# Patient Record
Sex: MALE | Race: WHITE | Employment: UNEMPLOYED | URBAN - METROPOLITAN AREA
[De-identification: names, ages, dates, MRNs, and addresses within clinical notes are randomized per-mention and may not be internally consistent; named-entity substitution may affect disease eponyms.]

---

## 2018-05-04 ENCOUNTER — PREP FOR PROCEDURE (OUTPATIENT)
Dept: GASTROENTEROLOGY | Age: 43
End: 2018-05-04

## 2018-05-04 RX ORDER — SODIUM CHLORIDE 9 MG/ML
INJECTION, SOLUTION INTRAVENOUS CONTINUOUS
Status: CANCELLED | OUTPATIENT
Start: 2018-05-04

## 2018-05-07 ENCOUNTER — ANESTHESIA EVENT (OUTPATIENT)
Dept: ENDOSCOPY | Age: 43
End: 2018-05-07
Payer: MEDICAID

## 2018-05-07 ENCOUNTER — ANESTHESIA (OUTPATIENT)
Dept: ENDOSCOPY | Age: 43
End: 2018-05-07
Payer: MEDICAID

## 2018-05-07 ENCOUNTER — HOSPITAL ENCOUNTER (OUTPATIENT)
Age: 43
Setting detail: OUTPATIENT SURGERY
Discharge: HOME | End: 2018-05-07
Attending: INTERNAL MEDICINE | Admitting: INTERNAL MEDICINE
Payer: MEDICAID

## 2018-05-07 VITALS
OXYGEN SATURATION: 96 % | SYSTOLIC BLOOD PRESSURE: 121 MMHG | RESPIRATION RATE: 16 BRPM | HEART RATE: 88 BPM | TEMPERATURE: 97 F | WEIGHT: 300 LBS | DIASTOLIC BLOOD PRESSURE: 78 MMHG | HEIGHT: 74 IN | BODY MASS INDEX: 38.5 KG/M2

## 2018-05-07 VITALS — DIASTOLIC BLOOD PRESSURE: 73 MMHG | SYSTOLIC BLOOD PRESSURE: 120 MMHG | OXYGEN SATURATION: 98 %

## 2018-05-07 LAB — METER GLUCOSE: 120 MG/DL (ref 70–110)

## 2018-05-07 PROCEDURE — 6360000002 HC RX W HCPCS: Performed by: NURSE ANESTHETIST, CERTIFIED REGISTERED

## 2018-05-07 PROCEDURE — 7100000011 HC PHASE II RECOVERY - ADDTL 15 MIN: Performed by: INTERNAL MEDICINE

## 2018-05-07 PROCEDURE — 3700000000 HC ANESTHESIA ATTENDED CARE: Performed by: INTERNAL MEDICINE

## 2018-05-07 PROCEDURE — 3700000001 HC ADD 15 MINUTES (ANESTHESIA): Performed by: INTERNAL MEDICINE

## 2018-05-07 PROCEDURE — 3609009500 HC COLONOSCOPY DIAGNOSTIC OR SCREENING: Performed by: INTERNAL MEDICINE

## 2018-05-07 PROCEDURE — 2580000003 HC RX 258: Performed by: INTERNAL MEDICINE

## 2018-05-07 PROCEDURE — 7100000010 HC PHASE II RECOVERY - FIRST 15 MIN: Performed by: INTERNAL MEDICINE

## 2018-05-07 PROCEDURE — 82962 GLUCOSE BLOOD TEST: CPT

## 2018-05-07 RX ORDER — SODIUM CHLORIDE 0.9 % (FLUSH) 0.9 %
10 SYRINGE (ML) INJECTION PRN
Status: DISCONTINUED | OUTPATIENT
Start: 2018-05-07 | End: 2018-05-07 | Stop reason: HOSPADM

## 2018-05-07 RX ORDER — PROPOFOL 10 MG/ML
INJECTION, EMULSION INTRAVENOUS PRN
Status: DISCONTINUED | OUTPATIENT
Start: 2018-05-07 | End: 2018-05-07 | Stop reason: SDUPTHER

## 2018-05-07 RX ORDER — SODIUM CHLORIDE 0.9 % (FLUSH) 0.9 %
10 SYRINGE (ML) INJECTION EVERY 12 HOURS SCHEDULED
Status: DISCONTINUED | OUTPATIENT
Start: 2018-05-07 | End: 2018-05-07 | Stop reason: HOSPADM

## 2018-05-07 RX ORDER — SODIUM CHLORIDE 9 MG/ML
INJECTION, SOLUTION INTRAVENOUS CONTINUOUS
Status: DISCONTINUED | OUTPATIENT
Start: 2018-05-07 | End: 2018-05-07 | Stop reason: HOSPADM

## 2018-05-07 RX ADMIN — SODIUM CHLORIDE: 9 INJECTION, SOLUTION INTRAVENOUS at 13:55

## 2018-05-07 RX ADMIN — PROPOFOL 400 MG: 10 INJECTION, EMULSION INTRAVENOUS at 14:00

## 2018-05-07 ASSESSMENT — PAIN SCALES - GENERAL
PAINLEVEL_OUTOF10: 0

## 2018-05-07 ASSESSMENT — PAIN - FUNCTIONAL ASSESSMENT: PAIN_FUNCTIONAL_ASSESSMENT: 0-10

## 2018-05-21 ENCOUNTER — OFFICE VISIT (OUTPATIENT)
Dept: FAMILY MEDICINE CLINIC | Age: 43
End: 2018-05-21
Payer: MEDICAID

## 2018-05-21 VITALS
HEART RATE: 104 BPM | DIASTOLIC BLOOD PRESSURE: 80 MMHG | HEIGHT: 74 IN | OXYGEN SATURATION: 95 % | TEMPERATURE: 98.4 F | RESPIRATION RATE: 16 BRPM | SYSTOLIC BLOOD PRESSURE: 122 MMHG | WEIGHT: 296 LBS | BODY MASS INDEX: 37.99 KG/M2

## 2018-05-21 DIAGNOSIS — E11.9 TYPE 2 DIABETES MELLITUS WITHOUT COMPLICATION, WITHOUT LONG-TERM CURRENT USE OF INSULIN (HCC): Primary | ICD-10-CM

## 2018-05-21 DIAGNOSIS — F51.01 PRIMARY INSOMNIA: ICD-10-CM

## 2018-05-21 DIAGNOSIS — E78.2 MIXED HYPERLIPIDEMIA: ICD-10-CM

## 2018-05-21 DIAGNOSIS — M54.16 LUMBAR RADICULOPATHY: ICD-10-CM

## 2018-05-21 DIAGNOSIS — I10 ESSENTIAL HYPERTENSION: ICD-10-CM

## 2018-05-21 DIAGNOSIS — M79.2 NEUROPATHIC PAIN: ICD-10-CM

## 2018-05-21 LAB — HBA1C MFR BLD: 7 %

## 2018-05-21 PROCEDURE — 83036 HEMOGLOBIN GLYCOSYLATED A1C: CPT | Performed by: NURSE PRACTITIONER

## 2018-05-21 PROCEDURE — 99214 OFFICE O/P EST MOD 30 MIN: CPT | Performed by: NURSE PRACTITIONER

## 2018-05-21 PROCEDURE — 3045F PR MOST RECENT HEMOGLOBIN A1C LEVEL 7.0-9.0%: CPT | Performed by: NURSE PRACTITIONER

## 2018-05-21 PROCEDURE — 2022F DILAT RTA XM EVC RTNOPTHY: CPT | Performed by: NURSE PRACTITIONER

## 2018-05-21 PROCEDURE — 4004F PT TOBACCO SCREEN RCVD TLK: CPT | Performed by: NURSE PRACTITIONER

## 2018-05-21 PROCEDURE — G8417 CALC BMI ABV UP PARAM F/U: HCPCS | Performed by: NURSE PRACTITIONER

## 2018-05-21 PROCEDURE — G8427 DOCREV CUR MEDS BY ELIG CLIN: HCPCS | Performed by: NURSE PRACTITIONER

## 2018-05-21 RX ORDER — PREGABALIN 300 MG/1
CAPSULE ORAL
Qty: 60 CAPSULE | Refills: 3 | Status: SHIPPED | OUTPATIENT
Start: 2018-05-21 | End: 2018-08-22 | Stop reason: SDUPTHER

## 2018-05-21 RX ORDER — ZOLPIDEM TARTRATE 10 MG/1
TABLET ORAL
Qty: 30 TABLET | Refills: 0 | Status: SHIPPED | OUTPATIENT
Start: 2018-05-21 | End: 2018-08-18

## 2018-05-21 RX ORDER — DULOXETIN HYDROCHLORIDE 30 MG/1
30 CAPSULE, DELAYED RELEASE ORAL DAILY
Qty: 30 CAPSULE | Refills: 5 | Status: SHIPPED | OUTPATIENT
Start: 2018-05-21 | End: 2018-11-29 | Stop reason: SDUPTHER

## 2018-05-21 RX ORDER — FLUTICASONE PROPIONATE 50 MCG
2 SPRAY, SUSPENSION (ML) NASAL DAILY
Qty: 1 BOTTLE | Refills: 5 | Status: SHIPPED | OUTPATIENT
Start: 2018-05-21 | End: 2018-11-29 | Stop reason: SDUPTHER

## 2018-05-21 RX ORDER — CYCLOBENZAPRINE HCL 10 MG
10 TABLET ORAL 3 TIMES DAILY PRN
Qty: 90 TABLET | Refills: 5 | Status: SHIPPED | OUTPATIENT
Start: 2018-05-21 | End: 2018-11-29 | Stop reason: SDUPTHER

## 2018-05-21 RX ORDER — PRAVASTATIN SODIUM 20 MG
TABLET ORAL
Qty: 30 TABLET | Refills: 5 | Status: SHIPPED | OUTPATIENT
Start: 2018-05-21 | End: 2018-11-29 | Stop reason: SDUPTHER

## 2018-05-21 RX ORDER — FENOFIBRATE 54 MG/1
54 TABLET ORAL DAILY
Qty: 30 TABLET | Refills: 5 | Status: SHIPPED | OUTPATIENT
Start: 2018-05-21 | End: 2018-11-29 | Stop reason: SDUPTHER

## 2018-05-21 RX ORDER — ASPIRIN 81 MG/1
81 TABLET ORAL DAILY
Qty: 30 TABLET | Refills: 5 | Status: SHIPPED | OUTPATIENT
Start: 2018-05-21 | End: 2018-11-18 | Stop reason: SDUPTHER

## 2018-05-21 RX ORDER — LISINOPRIL 20 MG/1
TABLET ORAL
Qty: 30 TABLET | Refills: 5 | Status: SHIPPED | OUTPATIENT
Start: 2018-05-21 | End: 2018-11-29 | Stop reason: SDUPTHER

## 2018-05-21 ASSESSMENT — ENCOUNTER SYMPTOMS
WHEEZING: 0
NAUSEA: 0
SORE THROAT: 0
VOICE CHANGE: 0
EYE REDNESS: 0
CONSTIPATION: 0
FACIAL SWELLING: 0
SHORTNESS OF BREATH: 0
COUGH: 0
SINUS PAIN: 0
ABDOMINAL DISTENTION: 0
PHOTOPHOBIA: 0
EYE PAIN: 0
TROUBLE SWALLOWING: 0
DIARRHEA: 0
STRIDOR: 0
VOMITING: 0
SINUS PRESSURE: 0
RECTAL PAIN: 0
COLOR CHANGE: 0
EYE ITCHING: 0
APNEA: 0
EYE DISCHARGE: 0
CHOKING: 0
ABDOMINAL PAIN: 0
RHINORRHEA: 0
CHEST TIGHTNESS: 0

## 2018-05-29 DIAGNOSIS — M16.11 PRIMARY OSTEOARTHRITIS OF RIGHT HIP: Primary | ICD-10-CM

## 2018-05-31 ENCOUNTER — HOSPITAL ENCOUNTER (OUTPATIENT)
Dept: GENERAL RADIOLOGY | Age: 43
Discharge: HOME | End: 2018-06-02
Payer: MEDICAID

## 2018-05-31 ENCOUNTER — OFFICE VISIT (OUTPATIENT)
Dept: ORTHOPEDIC SURGERY | Age: 43
End: 2018-05-31
Payer: MEDICAID

## 2018-05-31 VITALS — OXYGEN SATURATION: 98 % | HEIGHT: 74 IN | HEART RATE: 80 BPM | WEIGHT: 295 LBS | BODY MASS INDEX: 37.86 KG/M2

## 2018-05-31 DIAGNOSIS — M54.16 LUMBAR RADICULOPATHY: ICD-10-CM

## 2018-05-31 DIAGNOSIS — M16.11 PRIMARY OSTEOARTHRITIS OF RIGHT HIP: ICD-10-CM

## 2018-05-31 DIAGNOSIS — M16.12 PRIMARY OSTEOARTHRITIS OF LEFT HIP: Primary | ICD-10-CM

## 2018-05-31 PROCEDURE — 73502 X-RAY EXAM HIP UNI 2-3 VIEWS: CPT

## 2018-05-31 PROCEDURE — 4004F PT TOBACCO SCREEN RCVD TLK: CPT | Performed by: ORTHOPAEDIC SURGERY

## 2018-05-31 PROCEDURE — G8427 DOCREV CUR MEDS BY ELIG CLIN: HCPCS | Performed by: ORTHOPAEDIC SURGERY

## 2018-05-31 PROCEDURE — 99212 OFFICE O/P EST SF 10 MIN: CPT | Performed by: ORTHOPAEDIC SURGERY

## 2018-05-31 PROCEDURE — G8417 CALC BMI ABV UP PARAM F/U: HCPCS | Performed by: ORTHOPAEDIC SURGERY

## 2018-05-31 PROCEDURE — 99212 OFFICE O/P EST SF 10 MIN: CPT

## 2018-07-18 ENCOUNTER — HOSPITAL ENCOUNTER (EMERGENCY)
Age: 43
Discharge: HOME | End: 2018-07-19
Attending: EMERGENCY MEDICINE
Payer: MEDICAID

## 2018-07-18 VITALS
SYSTOLIC BLOOD PRESSURE: 138 MMHG | RESPIRATION RATE: 14 BRPM | HEIGHT: 74 IN | TEMPERATURE: 98.3 F | OXYGEN SATURATION: 100 % | WEIGHT: 196 LBS | HEART RATE: 94 BPM | DIASTOLIC BLOOD PRESSURE: 80 MMHG | BODY MASS INDEX: 25.15 KG/M2

## 2018-07-18 DIAGNOSIS — L23.7 POISON IVY DERMATITIS: Primary | ICD-10-CM

## 2018-07-18 PROCEDURE — 99282 EMERGENCY DEPT VISIT SF MDM: CPT

## 2018-07-18 PROCEDURE — 6370000000 HC RX 637 (ALT 250 FOR IP): Performed by: EMERGENCY MEDICINE

## 2018-07-18 PROCEDURE — 96372 THER/PROPH/DIAG INJ SC/IM: CPT

## 2018-07-18 PROCEDURE — 6360000002 HC RX W HCPCS: Performed by: EMERGENCY MEDICINE

## 2018-07-18 RX ORDER — KETOROLAC TROMETHAMINE 30 MG/ML
60 INJECTION, SOLUTION INTRAMUSCULAR; INTRAVENOUS ONCE
Status: COMPLETED | OUTPATIENT
Start: 2018-07-19 | End: 2018-07-18

## 2018-07-18 RX ORDER — METHYLPREDNISOLONE SODIUM SUCCINATE 125 MG/2ML
125 INJECTION, POWDER, LYOPHILIZED, FOR SOLUTION INTRAMUSCULAR; INTRAVENOUS ONCE
Status: COMPLETED | OUTPATIENT
Start: 2018-07-18 | End: 2018-07-18

## 2018-07-18 RX ORDER — FAMOTIDINE 20 MG/1
20 TABLET, FILM COATED ORAL ONCE
Status: COMPLETED | OUTPATIENT
Start: 2018-07-18 | End: 2018-07-18

## 2018-07-18 RX ORDER — METHYLPREDNISOLONE 4 MG/1
TABLET ORAL
Qty: 1 KIT | Refills: 0 | Status: SHIPPED | OUTPATIENT
Start: 2018-07-18 | End: 2018-07-24

## 2018-07-18 RX ADMIN — KETOROLAC TROMETHAMINE 60 MG: 30 INJECTION, SOLUTION INTRAMUSCULAR at 23:48

## 2018-07-18 RX ADMIN — METHYLPREDNISOLONE SODIUM SUCCINATE 125 MG: 125 INJECTION, POWDER, FOR SOLUTION INTRAMUSCULAR; INTRAVENOUS at 23:43

## 2018-07-18 RX ADMIN — FAMOTIDINE 20 MG: 20 TABLET ORAL at 23:43

## 2018-07-18 ASSESSMENT — PAIN SCALES - GENERAL
PAINLEVEL_OUTOF10: 8
PAINLEVEL_OUTOF10: 8

## 2018-07-19 NOTE — ED NOTES
Discharged with a followup to PCP.  Return here with worsenings or concerns     Bob Bronson RN  07/19/18 0002

## 2018-07-19 NOTE — ED PROVIDER NOTES
Department of Emergency Medicine   ED  Provider Note  ED Room: 06/06   HPI:  7/18/18, Time: 11:42 PM     Hitesh Hernandez II is a 38 y.o. male presenting to the ED for rash, worsening today. The complaint has been constant, moderate in severity, and worsened by nothing. Pt hx provided by the pt. Pt states he has been burning poison ivy or poison oak, and presents with a diffuse erythematous rash. He has been taking Benadryl but denies any relief. He c/o diffuse pain and itching. Pt denies any CP, SOB, HA, N/V/D, abdominal pain, back pain, neck pain, numbness/tingling in the extremities, or any other symptoms at this time. ROS:   Pertinent positives and negatives are stated within HPI, all other systems reviewed and are negative.    --------------------------------------------- PAST HISTORY ---------------------------------------------  Past Medical History:  has a past medical history of SIMÓN (acute kidney injury) ([facility] [address] Allergic rhinitis; Chronic back pain; Depression; Diabetes mellitus ([facility] [address] Difficulty sleeping; Displacement of lumbar intervertebral disc without myelopathy; Fibromyalgia; Fractured rib; H/O seasonal allergies; Head injury; Hyperlipidemia; Hypertension; Obesity (BMI 35.0-39.9 without comorbidity); Osteoarthritis; and Thoracic or lumbosacral neuritis or radiculitis, unspecified. Past Surgical History:  has a past surgical history that includes Neck surgery (2000); shoulder surgery (2001 &2007); Wrist surgery (2007); Rotator cuff repair (Left, 2014); hernia repair (2001); Shoulder arthroscopy (Left, 11 21 14); Vasectomy; Hip Arthroplasty (Left, 4/11/2016); Total hip arthroplasty (Right, 10/31/2016); Foot surgery (Right, 1985); and pr colonoscopy flx dx w/collj spec when pfrmd (N/A, 5/7/2018). Social History:  reports that he has never smoked. His smokeless tobacco use includes Chew. He reports that he does not drink alcohol or use drugs.     Family History: family history includes Arthritis in his father; Cancer in his maternal grandfather and paternal uncle; Diabetes in his father and paternal grandfather; Heart Disease in his maternal grandmother; Hypertension in his mother; Stroke in his maternal aunt. The patients home medications have been reviewed. Allergies: Seasonal and Tramadol    -----------------------------------------PHYSICAL EXAM------------------------------------------------  Constitutional/General: Alert and oriented x3, well appearing, non toxic  Head: NC/AT  Mouth: Oropharynx clear, handling secretions, no posterior edema, erythema, or exudates  Neck: Supple, full ROM  Pulmonary: Lungs clear to auscultation bilaterally, no wheezes, rales, or rhonchi. Not in respiratory distress. Cardiovascular:  Regular rate and rhythm, no murmurs, gallops, or rubs. 2+ distal pulses. Abdomen: Soft, non tender, non distended  Extremities: Moves all extremities x 4. Warm and well perfused. Skin: warm and dry. Diffuse erythematous rash on the upper and lower extremities, neck, abdomen, and back. Neurologic: GCS 15, CN 2-12 grossly intact, no focal deficits, no meningeal signs  Psych: Normal Affect.    -------------------------------------------------- RESULTS -------------------------------------------------  All laboratory and radiology results have been personally reviewed by myself   LABS:  No results found for this visit on 07/18/18. RADIOLOGY:  Interpreted by Radiologist.  No orders to display       ------------------------- NURSING NOTES AND VITALS REVIEWED ---------------------------  The nursing notes within the ED encounter and vital signs as below have been reviewed.    /80   Pulse 94   Temp 98.3 °F (36.8 °C) (Oral)   Resp 14   Ht 6' 2\" (1.88 m)   Wt 196 lb (88.9 kg)   SpO2 100%   BMI 25.16 kg/m²   Oxygen Saturation Interpretation: Normal    ------------------------------- ED COURSE/MEDICAL DECISION MAKING----------------------  Medications ketorolac (TORADOL) injection 60 mg (not administered)   methylPREDNISolone sodium (SOLU-MEDROL) injection 125 mg (125 mg Intramuscular Given 7/18/18 2343)   famotidine (PEPCID) tablet 20 mg (20 mg Oral Given 7/18/18 2343)     Medical Decision Making:   Pt presents with diffuse erythematous itching rash after exposure to poison ivy or poison oak. Solumedrol, Pepcid, and Toradol given prior to discharge. He will be discharged home with Rx medrol dose pack. At this time, the patient is stable for discharge. Any questions or concerns were answered. Emergency provider has shared the specific conditions for return, as well as the importance of follow-up with PCP. Counseling: The emergency provider has spoken with the patient and discussed todays plan of care and counseling regarding the diagnosis and prognosis. Questions are answered at this time and they are agreeable with the plan.      --------------------------------- IMPRESSION AND DISPOSITION ---------------------------------    IMPRESSION  1. Poison ivy dermatitis        DISPOSITION  Disposition: Discharge to home  Patient condition is stable    SCRIBE ATTESTATION    7/18/18, 11:42 PM.    This note is prepared by Lelo Sparks, acting as Scribe for Ritesh Pfeiffer DO. Ritesh Pfeiffer DO:  The scribe's documentation has been prepared under my direction and personally reviewed by me in its entirety. I confirm that the note above accurately reflects all work, treatment, procedures, and medical decision making performed by me. NOTE: This report was edited using voice recognition software. Every effort was made to ensure accuracy; however, inadvertent computerized transcription errors may be present.       Ritesh Pfeiffer DO  07/19/18 1795

## 2018-08-01 ENCOUNTER — HOSPITAL ENCOUNTER (EMERGENCY)
Age: 43
Discharge: HOME | End: 2018-08-01
Attending: EMERGENCY MEDICINE
Payer: MEDICAID

## 2018-08-01 VITALS
HEART RATE: 124 BPM | RESPIRATION RATE: 18 BRPM | SYSTOLIC BLOOD PRESSURE: 152 MMHG | TEMPERATURE: 98.8 F | DIASTOLIC BLOOD PRESSURE: 80 MMHG | WEIGHT: 295 LBS | HEIGHT: 74 IN | OXYGEN SATURATION: 98 % | BODY MASS INDEX: 37.86 KG/M2

## 2018-08-01 DIAGNOSIS — L03.90 CELLULITIS, UNSPECIFIED CELLULITIS SITE: Primary | ICD-10-CM

## 2018-08-01 PROCEDURE — 6360000002 HC RX W HCPCS: Performed by: EMERGENCY MEDICINE

## 2018-08-01 PROCEDURE — 99282 EMERGENCY DEPT VISIT SF MDM: CPT

## 2018-08-01 PROCEDURE — 96372 THER/PROPH/DIAG INJ SC/IM: CPT

## 2018-08-01 PROCEDURE — 2500000003 HC RX 250 WO HCPCS: Performed by: EMERGENCY MEDICINE

## 2018-08-01 RX ORDER — CEPHALEXIN 500 MG/1
500 CAPSULE ORAL 4 TIMES DAILY
Qty: 40 CAPSULE | Refills: 0 | Status: SHIPPED | OUTPATIENT
Start: 2018-08-01 | End: 2018-08-11

## 2018-08-01 RX ADMIN — CEFTRIAXONE SODIUM 1 G: 1 INJECTION, POWDER, FOR SOLUTION INTRAMUSCULAR; INTRAVENOUS at 23:20

## 2018-08-01 ASSESSMENT — PAIN DESCRIPTION - ONSET: ONSET: SUDDEN

## 2018-08-01 ASSESSMENT — PAIN DESCRIPTION - DESCRIPTORS: DESCRIPTORS: BURNING;ACHING

## 2018-08-01 ASSESSMENT — PAIN DESCRIPTION - PAIN TYPE: TYPE: ACUTE PAIN

## 2018-08-01 ASSESSMENT — PAIN DESCRIPTION - LOCATION: LOCATION: HAND

## 2018-08-01 ASSESSMENT — PAIN DESCRIPTION - PROGRESSION: CLINICAL_PROGRESSION: GRADUALLY WORSENING

## 2018-08-01 ASSESSMENT — PAIN DESCRIPTION - FREQUENCY: FREQUENCY: CONTINUOUS

## 2018-08-01 ASSESSMENT — PAIN SCALES - GENERAL: PAINLEVEL_OUTOF10: 10

## 2018-08-02 NOTE — ED NOTES
Instructions provided and questions answered. Prescriptions verified with patient.       aCrmen Meraz RN  08/01/18 [id number]

## 2018-08-22 ENCOUNTER — OFFICE VISIT (OUTPATIENT)
Dept: FAMILY MEDICINE CLINIC | Age: 43
End: 2018-08-22
Payer: MEDICAID

## 2018-08-22 VITALS
BODY MASS INDEX: 39.01 KG/M2 | RESPIRATION RATE: 18 BRPM | WEIGHT: 304 LBS | SYSTOLIC BLOOD PRESSURE: 116 MMHG | HEART RATE: 86 BPM | OXYGEN SATURATION: 98 % | DIASTOLIC BLOOD PRESSURE: 78 MMHG | HEIGHT: 74 IN

## 2018-08-22 DIAGNOSIS — M54.16 LUMBAR RADICULOPATHY: ICD-10-CM

## 2018-08-22 DIAGNOSIS — M79.2 NEUROPATHIC PAIN: ICD-10-CM

## 2018-08-22 DIAGNOSIS — E78.2 MIXED HYPERLIPIDEMIA: ICD-10-CM

## 2018-08-22 DIAGNOSIS — I10 ESSENTIAL HYPERTENSION: ICD-10-CM

## 2018-08-22 DIAGNOSIS — E11.9 TYPE 2 DIABETES MELLITUS WITHOUT COMPLICATION, WITHOUT LONG-TERM CURRENT USE OF INSULIN (HCC): ICD-10-CM

## 2018-08-22 LAB
CREATININE URINE POCT: 300
HBA1C MFR BLD: 7.8 %
MICROALBUMIN/CREAT 24H UR: 10 MG/G{CREAT}
MICROALBUMIN/CREAT UR-RTO: <30

## 2018-08-22 PROCEDURE — 99214 OFFICE O/P EST MOD 30 MIN: CPT | Performed by: NURSE PRACTITIONER

## 2018-08-22 PROCEDURE — G8427 DOCREV CUR MEDS BY ELIG CLIN: HCPCS | Performed by: NURSE PRACTITIONER

## 2018-08-22 PROCEDURE — 82044 UR ALBUMIN SEMIQUANTITATIVE: CPT | Performed by: NURSE PRACTITIONER

## 2018-08-22 PROCEDURE — 83036 HEMOGLOBIN GLYCOSYLATED A1C: CPT | Performed by: NURSE PRACTITIONER

## 2018-08-22 PROCEDURE — 2022F DILAT RTA XM EVC RTNOPTHY: CPT | Performed by: NURSE PRACTITIONER

## 2018-08-22 PROCEDURE — G8417 CALC BMI ABV UP PARAM F/U: HCPCS | Performed by: NURSE PRACTITIONER

## 2018-08-22 PROCEDURE — 4004F PT TOBACCO SCREEN RCVD TLK: CPT | Performed by: NURSE PRACTITIONER

## 2018-08-22 PROCEDURE — 3045F PR MOST RECENT HEMOGLOBIN A1C LEVEL 7.0-9.0%: CPT | Performed by: NURSE PRACTITIONER

## 2018-08-22 RX ORDER — DULOXETIN HYDROCHLORIDE 30 MG/1
30 CAPSULE, DELAYED RELEASE ORAL DAILY
Qty: 30 CAPSULE | Refills: 5 | Status: CANCELLED | OUTPATIENT
Start: 2018-08-22

## 2018-08-22 RX ORDER — PREGABALIN 300 MG/1
CAPSULE ORAL
Qty: 60 CAPSULE | Refills: 3 | Status: SHIPPED | OUTPATIENT
Start: 2018-08-22 | End: 2018-11-29 | Stop reason: SDUPTHER

## 2018-08-22 RX ORDER — CYCLOBENZAPRINE HCL 10 MG
10 TABLET ORAL 3 TIMES DAILY PRN
Qty: 90 TABLET | Refills: 5 | Status: CANCELLED | OUTPATIENT
Start: 2018-08-22

## 2018-08-22 RX ORDER — PRAVASTATIN SODIUM 20 MG
TABLET ORAL
Qty: 30 TABLET | Refills: 5 | Status: CANCELLED | OUTPATIENT
Start: 2018-08-22

## 2018-08-22 RX ORDER — LISINOPRIL 20 MG/1
TABLET ORAL
Qty: 30 TABLET | Refills: 5 | Status: CANCELLED | OUTPATIENT
Start: 2018-08-22

## 2018-08-22 ASSESSMENT — ENCOUNTER SYMPTOMS
STRIDOR: 0
EYE REDNESS: 0
CHOKING: 0
SINUS PRESSURE: 0
COUGH: 0
EYE PAIN: 0
VOMITING: 0
WHEEZING: 0
EYE ITCHING: 0
SHORTNESS OF BREATH: 0
RECTAL PAIN: 0
NAUSEA: 0
RHINORRHEA: 0
ABDOMINAL DISTENTION: 0
VOICE CHANGE: 0
SINUS PAIN: 0
ABDOMINAL PAIN: 0
SORE THROAT: 0
COLOR CHANGE: 0
TROUBLE SWALLOWING: 0
EYE DISCHARGE: 0
CONSTIPATION: 0
FACIAL SWELLING: 0
APNEA: 0
CHEST TIGHTNESS: 0
DIARRHEA: 0
PHOTOPHOBIA: 0

## 2018-08-22 NOTE — PATIENT INSTRUCTIONS
Patient Education        Back Pain: Care Instructions  Your Care Instructions    Back pain has many possible causes. It is often related to problems with muscles and ligaments of the back. It may also be related to problems with the nerves, discs, or bones of the back. Moving, lifting, standing, sitting, or sleeping in an awkward way can strain the back. Sometimes you don't notice the injury until later. Arthritis is another common cause of back pain. Although it may hurt a lot, back pain usually improves on its own within several weeks. Most people recover in 12 weeks or less. Using good home treatment and being careful not to stress your back can help you feel better sooner. Follow-up care is a key part of your treatment and safety. Be sure to make and go to all appointments, and call your doctor if you are having problems. It's also a good idea to know your test results and keep a list of the medicines you take. How can you care for yourself at home? · Sit or lie in positions that are most comfortable and reduce your pain. Try one of these positions when you lie down:  ¨ Lie on your back with your knees bent and supported by large pillows. ¨ Lie on the floor with your legs on the seat of a sofa or chair. Adonica Liliya on your side with your knees and hips bent and a pillow between your legs. ¨ Lie on your stomach if it does not make pain worse. · Do not sit up in bed, and avoid soft couches and twisted positions. Bed rest can help relieve pain at first, but it delays healing. Avoid bed rest after the first day of back pain. · Change positions every 30 minutes. If you must sit for long periods of time, take breaks from sitting. Get up and walk around, or lie in a comfortable position. · Try using a heating pad on a low or medium setting for 15 to 20 minutes every 2 or 3 hours. Try a warm shower in place of one session with the heating pad.   · You can also try an ice pack for 10 to 15 minutes every 2 to 3 soft. Use moisturizing skin cream to keep the skin on your feet soft and prevent calluses and cracks. But do not put the cream between your toes, and stop using any cream that causes a rash. ¨ Clean underneath your toenails carefully. Do not use a sharp object to clean underneath your toenails. Use the blunt end of a nail file or other rounded tool. ¨ Trim and file your toenails straight across to prevent ingrown toenails. Use a nail clipper, not scissors. Use an emery board to smooth the edges. · Change socks daily. Socks without seams are best, because seams often rub the feet. You can find socks for people with diabetes from specialty catalogs. · Look inside your shoes every day for things like gravel or torn linings, which could cause blisters or sores. · Buy shoes that fit well:  ¨ Look for shoes that have plenty of space around the toes. This helps prevent bunions and blisters. ¨ Try on shoes while wearing the kind of socks you will usually wear with the shoes. ¨ Avoid plastic shoes. They may rub your feet and cause blisters. Good shoes should be made of materials that are flexible and breathable, such as leather or cloth. ¨ Break in new shoes slowly by wearing them for no more than an hour a day for several days. Take extra time to check your feet for red areas, blisters, or other problems after you wear new shoes. · Do not go barefoot. Do not wear sandals, and do not wear shoes with very thin soles. Thin soles are easy to puncture. They also do not protect your feet from hot pavement or cold weather. · Have your doctor check your feet during each visit. If you have a foot problem, see your doctor. Do not try to treat an early foot problem at home. Home remedies or treatments that you can buy without a prescription (such as corn removers) can be harmful. · Always get early treatment for foot problems. A minor irritation can lead to a major problem if not properly cared for early.   When should you call for help? Call your doctor now or seek immediate medical care if:    · You have a foot sore, an ulcer or break in the skin that is not healing after 4 days, bleeding corns or calluses, or an ingrown toenail.     · You have blue or black areas, which can mean bruising or blood flow problems.     · You have peeling skin or tiny blisters between your toes or cracking or oozing of the skin.     · You have a fever for more than 24 hours and a foot sore.     · You have new numbness or tingling in your feet that does not go away after you move your feet or change positions.     · You have unexplained or unusual swelling of the foot or ankle.    Watch closely for changes in your health, and be sure to contact your doctor if:    · You cannot do proper foot care. Where can you learn more? Go to https://American Aerogelpepiceweb.[employer]. org and sign in to your Amadesa account. Enter A739 in the pocketfungames box to learn more about \"Diabetes Foot Health: Care Instructions. \"     If you do not have an account, please click on the \"Sign Up Now\" link. Current as of: December 7, 2017  Content Version: 11.7  © 2398-5470 [employer]. Care instructions adapted under license by [facility] [facility] [place] [address] If you have questions about a medical condition or this instruction, always ask your healthcare professional. [address] any warranty or liability for your use of this information. Patient Education        DASH Diet: Care Instructions  Your Care Instructions    The DASH diet is an eating plan that can help lower your blood pressure. DASH stands for Dietary Approaches to Stop Hypertension. Hypertension is high blood pressure. The DASH diet focuses on eating foods that are high in calcium, potassium, and magnesium. These nutrients can lower blood pressure. The foods that are highest in these nutrients are fruits, vegetables, low-fat dairy products, nuts, seeds, and legumes.  But taking margarine, and hydrogenated or partially hydrogenated oils with olive and canola oils. (Canola oil margarine without trans fat is fine.)  · Replace red meat with fish, poultry, and soy protein (like tofu). · Limit processed and packaged foods like chips, crackers, and cookies. · Bake, broil, or steam foods. Don't andrea them. · Be physically active. Get at least 30 minutes of exercise on most days of the week. Walking is a good choice. You also may want to do other activities, such as running, swimming, cycling, or playing tennis or team sports. · Stay at a healthy weight or lose weight by making the changes in eating and physical activity listed above. Losing just a small amount of weight, even 5 to 10 pounds, can reduce your risk for having a heart attack or stroke. · Do not smoke. When should you call for help? Watch closely for changes in your health, and be sure to contact your doctor if:    · You need help making lifestyle changes.     · You have questions about your medicine. Where can you learn more? Go to https://Recorded FuturepeVirtualLogix.[employer] org and sign in to your T[employer] TV account. Enter C136 in the GroupFlier box to learn more about \"High Cholesterol: Care Instructions. \"     If you do not have an account, please click on the \"Sign Up Now\" link. Current as of: May 10, 2017  Content Version: 11.7  © 5953-6818 [employer]. Care instructions adapted under license by [facility] ([facility]). If you have questions about a medical condition or this instruction, always ask your healthcare professional. [place] [address] any warranty or liability for your use of this information. Patient Education        Learning About High Blood Pressure  What is high blood pressure? Blood pressure is a measure of how hard the blood pushes against the walls of your arteries.  It's normal for blood pressure to go up and down throughout the day, but if it stays up, you have high blood

## 2018-08-22 NOTE — PROGRESS NOTES
Rafi Underwood is a 38 y.o. male who presents today for   Chief Complaint   Patient presents with    Diabetes    Hypertension    Hyperlipidemia    Lower Back Pain     h/o chronic lumbar radiculopathy/neuropathic pain         HPI    Treatment Adherence:   Medication compliance:  compliant all of the time  Diet compliance:  compliant most of the time  Weight trend: stable  Current exercise: no regular exercise  Barriers: none    Diabetes Mellitus Type 2: Current symptoms/problems include none. Home blood sugar records: trend: fluctuating a bit  Any episodes of hypoglycemia? no  Eye exam current (within one year): yes  Tobacco history: He  reports that he has never smoked. His smokeless tobacco use includes Chew. Daily Aspirin? Yes  A1C today is 7.8%-Increased, advised on compliance with diet and exercise. Pt was on oral steroids for approximately 2 weeks d/t severe case of poison Ivy. Will continue current treatment plan but if A1C increases in 3 months will need to make medication adjustments    Hypertension:  Home blood pressure monitoring: No.  He is adherent to a low sodium diet. Patient denies chest pain, shortness of breath, headache, lightheadedness, blurred vision, peripheral edema, palpitations and dry cough. Antihypertensive medication side effects: no medication side effects noted. Use of agents associated with hypertension: none. Hyperlipidemia:  No new myalgias or GI upset on pravastatin (Pravachol).        Lab Results   Component Value Date    LABA1C 7.8 08/22/2018    LABA1C 7.0 05/21/2018    LABA1C 7.1 02/21/2018     Lab Results   Component Value Date    LABMICR <12.0 08/12/2016    CREATININE 1.0 02/21/2018     Lab Results   Component Value Date    ALT 30 02/21/2018    AST 26 02/21/2018     Lab Results   Component Value Date    CHOL 187 02/21/2018    TRIG 367 (H) 02/21/2018    HDL 33 02/21/2018    LDLCALC 81 02/21/2018          Lumbar Radiculopathy/Neuropathic Pain  Pt has a is not nervous/anxious. Physical Exam:    VS:  /78   Pulse 86   Resp 18   Ht 6' 2\" (1.88 m)   Wt (!) 304 lb (137.9 kg)   SpO2 98%   BMI 39.03 kg/m²   LAST WEIGHT:  Wt Readings from Last 3 Encounters:   08/22/18 (!) 304 lb (137.9 kg)   08/01/18 295 lb (133.8 kg)   07/18/18 196 lb (88.9 kg)     Physical Exam   Constitutional: He is oriented to person, place, and time. He appears well-developed and well-nourished. Obese   HENT:   Head: Normocephalic and atraumatic. Right Ear: External ear normal.   Nose: Nose normal.   Mouth/Throat: Oropharynx is clear and moist. No oropharyngeal exudate. Eyes: Pupils are equal, round, and reactive to light. Conjunctivae and EOM are normal. Right eye exhibits no discharge. Left eye exhibits no discharge. Neck: Normal range of motion. Neck supple. No JVD present. Cardiovascular: Normal rate, regular rhythm, normal heart sounds and intact distal pulses. No murmur heard. No peripheral edema   Pulmonary/Chest: Effort normal and breath sounds normal. No respiratory distress. He has no wheezes. He has no rales. He exhibits no tenderness. Abdominal: Soft. Bowel sounds are normal. He exhibits no distension and no mass. There is no tenderness. There is no rebound and no guarding. Musculoskeletal:        Right foot: There is normal range of motion and no deformity. Left foot: There is normal range of motion and no deformity. Mild/moderate lumbar spine and paraspinal muscle tenderness bilaterally. No erythema/edema or deformities present, no contusions present. Slow in changing positions from sitting to standing, Decreased ROM       Feet:   Right Foot:   Protective Sensation: 5 sites tested. 5 sites sensed. Skin Integrity: Positive for callus and dry skin. Negative for ulcer, blister, skin breakdown, erythema or warmth. Left Foot:   Protective Sensation: 5 sites tested. 5 sites sensed. Skin Integrity: Positive for callus and dry skin.  Negative materials and/or home exercises printed for patient's review and were included in patient instructions on his/her After Visit Summary and given to patient at the end of visit. Counseled regarding above diagnosis, including possible risks and complications,  especially if left uncontrolled. Counseled regarding the possible side effects, risks, benefits and alternatives to treatment; patient and/or guardian verbalizes understanding, agrees, feels comfortable with and wishes to proceed with above treatment plan. Advised patient to call with any new medication issues, and read all Rx info from pharmacy to assure aware of all possible risks and side effects of medication before taking. Reviewed age and gender appropriate health screening exams and vaccinations. Advised patient regarding importance of keeping up with recommended health maintenance and to schedule as soon as possible if overdue, as this is important in assessing for undiagnosed pathology, especially cancer, as well as protecting against potentially harmful/life threatening disease. Patient and/or guardian verbalizes understanding and agrees with above counseling, assessment and plan. All questions answered.     Amber Laguna, APRN - CNP

## 2018-08-24 ENCOUNTER — HOSPITAL ENCOUNTER (OUTPATIENT)
Age: 43
Discharge: HOME | End: 2018-08-24
Payer: MEDICAID

## 2018-08-24 DIAGNOSIS — E78.2 MIXED HYPERLIPIDEMIA: ICD-10-CM

## 2018-08-24 DIAGNOSIS — E11.9 TYPE 2 DIABETES MELLITUS WITHOUT COMPLICATION, WITHOUT LONG-TERM CURRENT USE OF INSULIN (HCC): ICD-10-CM

## 2018-08-24 DIAGNOSIS — I10 ESSENTIAL HYPERTENSION: ICD-10-CM

## 2018-08-24 LAB
ALBUMIN SERPL-MCNC: 4.4 G/DL (ref 3.5–5.2)
ALP BLD-CCNC: 107 U/L (ref 40–129)
ALT SERPL-CCNC: 33 U/L (ref 0–40)
ANION GAP SERPL CALCULATED.3IONS-SCNC: 11 MMOL/L (ref 7–16)
AST SERPL-CCNC: 23 U/L (ref 0–39)
BASOPHILS ABSOLUTE: 0.04 E9/L (ref 0–0.2)
BASOPHILS RELATIVE PERCENT: 0.5 % (ref 0–2)
BILIRUB SERPL-MCNC: 0.6 MG/DL (ref 0–1.2)
BUN BLDV-MCNC: 10 MG/DL (ref 6–20)
CALCIUM SERPL-MCNC: 9.7 MG/DL (ref 8.6–10.2)
CHLORIDE BLD-SCNC: 102 MMOL/L (ref 98–107)
CHOLESTEROL, TOTAL: 204 MG/DL (ref 0–199)
CO2: 27 MMOL/L (ref 22–29)
CREAT SERPL-MCNC: 1 MG/DL (ref 0.7–1.2)
EOSINOPHILS ABSOLUTE: 0.28 E9/L (ref 0.05–0.5)
EOSINOPHILS RELATIVE PERCENT: 3.5 % (ref 0–6)
GFR AFRICAN AMERICAN: >60
GFR NON-AFRICAN AMERICAN: >60 ML/MIN/1.73
GLUCOSE BLD-MCNC: 170 MG/DL (ref 74–109)
HCT VFR BLD CALC: 43.7 % (ref 37–54)
HDLC SERPL-MCNC: 32 MG/DL
HEMOGLOBIN: 15.2 G/DL (ref 12.5–16.5)
IMMATURE GRANULOCYTES #: 0.04 E9/L
IMMATURE GRANULOCYTES %: 0.5 % (ref 0–5)
LDL CHOLESTEROL CALCULATED: 112 MG/DL (ref 0–99)
LYMPHOCYTES ABSOLUTE: 1.63 E9/L (ref 1.5–4)
LYMPHOCYTES RELATIVE PERCENT: 20.1 % (ref 20–42)
MCH RBC QN AUTO: 30.2 PG (ref 26–35)
MCHC RBC AUTO-ENTMCNC: 34.8 % (ref 32–34.5)
MCV RBC AUTO: 86.7 FL (ref 80–99.9)
MONOCYTES ABSOLUTE: 0.61 E9/L (ref 0.1–0.95)
MONOCYTES RELATIVE PERCENT: 7.5 % (ref 2–12)
NEUTROPHILS ABSOLUTE: 5.51 E9/L (ref 1.8–7.3)
NEUTROPHILS RELATIVE PERCENT: 67.9 % (ref 43–80)
PDW BLD-RTO: 12.9 FL (ref 11.5–15)
PLATELET # BLD: 253 E9/L (ref 130–450)
PMV BLD AUTO: 9.1 FL (ref 7–12)
POTASSIUM SERPL-SCNC: 4.1 MMOL/L (ref 3.5–5)
RBC # BLD: 5.04 E12/L (ref 3.8–5.8)
SODIUM BLD-SCNC: 140 MMOL/L (ref 132–146)
TOTAL PROTEIN: 7.2 G/DL (ref 6.4–8.3)
TRIGL SERPL-MCNC: 301 MG/DL (ref 0–149)
TSH SERPL DL<=0.05 MIU/L-ACNC: 1.08 UIU/ML (ref 0.27–4.2)
VLDLC SERPL CALC-MCNC: 60 MG/DL
WBC # BLD: 8.1 E9/L (ref 4.5–11.5)

## 2018-08-24 PROCEDURE — 80061 LIPID PANEL: CPT

## 2018-08-24 PROCEDURE — 85025 COMPLETE CBC W/AUTO DIFF WBC: CPT

## 2018-08-24 PROCEDURE — 80053 COMPREHEN METABOLIC PANEL: CPT

## 2018-08-24 PROCEDURE — 36415 COLL VENOUS BLD VENIPUNCTURE: CPT

## 2018-08-24 PROCEDURE — 84443 ASSAY THYROID STIM HORMONE: CPT

## 2018-11-18 DIAGNOSIS — E11.9 TYPE 2 DIABETES MELLITUS WITHOUT COMPLICATION, WITHOUT LONG-TERM CURRENT USE OF INSULIN (HCC): ICD-10-CM

## 2018-11-19 RX ORDER — ASPIRIN 81 MG/1
TABLET, DELAYED RELEASE ORAL
Qty: 30 TABLET | Refills: 5 | Status: SHIPPED | OUTPATIENT
Start: 2018-11-19 | End: 2018-11-29 | Stop reason: SDUPTHER

## 2018-11-29 ENCOUNTER — OFFICE VISIT (OUTPATIENT)
Dept: FAMILY MEDICINE CLINIC | Age: 43
End: 2018-11-29
Payer: MEDICAID

## 2018-11-29 VITALS
WEIGHT: 299 LBS | SYSTOLIC BLOOD PRESSURE: 134 MMHG | TEMPERATURE: 97.2 F | OXYGEN SATURATION: 99 % | DIASTOLIC BLOOD PRESSURE: 88 MMHG | RESPIRATION RATE: 16 BRPM | HEART RATE: 131 BPM | BODY MASS INDEX: 38.37 KG/M2 | HEIGHT: 74 IN

## 2018-11-29 DIAGNOSIS — E11.9 TYPE 2 DIABETES MELLITUS WITHOUT COMPLICATION, WITHOUT LONG-TERM CURRENT USE OF INSULIN (HCC): ICD-10-CM

## 2018-11-29 DIAGNOSIS — M79.2 NEUROPATHIC PAIN: ICD-10-CM

## 2018-11-29 DIAGNOSIS — E78.2 MIXED HYPERLIPIDEMIA: ICD-10-CM

## 2018-11-29 DIAGNOSIS — I10 ESSENTIAL HYPERTENSION: ICD-10-CM

## 2018-11-29 DIAGNOSIS — M54.16 LUMBAR RADICULOPATHY: ICD-10-CM

## 2018-11-29 LAB — HBA1C MFR BLD: 7.8 %

## 2018-11-29 PROCEDURE — G8427 DOCREV CUR MEDS BY ELIG CLIN: HCPCS | Performed by: NURSE PRACTITIONER

## 2018-11-29 PROCEDURE — 83036 HEMOGLOBIN GLYCOSYLATED A1C: CPT | Performed by: NURSE PRACTITIONER

## 2018-11-29 PROCEDURE — G8417 CALC BMI ABV UP PARAM F/U: HCPCS | Performed by: NURSE PRACTITIONER

## 2018-11-29 PROCEDURE — 4004F PT TOBACCO SCREEN RCVD TLK: CPT | Performed by: NURSE PRACTITIONER

## 2018-11-29 PROCEDURE — 2022F DILAT RTA XM EVC RTNOPTHY: CPT | Performed by: NURSE PRACTITIONER

## 2018-11-29 PROCEDURE — G8484 FLU IMMUNIZE NO ADMIN: HCPCS | Performed by: NURSE PRACTITIONER

## 2018-11-29 PROCEDURE — 3045F PR MOST RECENT HEMOGLOBIN A1C LEVEL 7.0-9.0%: CPT | Performed by: NURSE PRACTITIONER

## 2018-11-29 PROCEDURE — 99214 OFFICE O/P EST MOD 30 MIN: CPT | Performed by: NURSE PRACTITIONER

## 2018-11-29 RX ORDER — FLUTICASONE PROPIONATE 50 MCG
2 SPRAY, SUSPENSION (ML) NASAL DAILY
Qty: 1 BOTTLE | Refills: 5 | Status: SHIPPED | OUTPATIENT
Start: 2018-11-29 | End: 2019-05-15 | Stop reason: SDUPTHER

## 2018-11-29 RX ORDER — ASPIRIN 81 MG/1
TABLET ORAL
Qty: 30 TABLET | Refills: 5 | Status: SHIPPED | OUTPATIENT
Start: 2018-11-29 | End: 2019-05-15 | Stop reason: SDUPTHER

## 2018-11-29 RX ORDER — DULOXETIN HYDROCHLORIDE 30 MG/1
30 CAPSULE, DELAYED RELEASE ORAL DAILY
Qty: 30 CAPSULE | Refills: 5 | Status: SHIPPED | OUTPATIENT
Start: 2018-11-29 | End: 2019-03-11 | Stop reason: SDUPTHER

## 2018-11-29 RX ORDER — PRAVASTATIN SODIUM 20 MG
TABLET ORAL
Qty: 30 TABLET | Refills: 5 | Status: SHIPPED | OUTPATIENT
Start: 2018-11-29 | End: 2019-03-11 | Stop reason: SDUPTHER

## 2018-11-29 RX ORDER — KETOROLAC TROMETHAMINE 30 MG/ML
60 INJECTION, SOLUTION INTRAMUSCULAR; INTRAVENOUS ONCE
Status: COMPLETED | OUTPATIENT
Start: 2018-11-29 | End: 2018-11-29

## 2018-11-29 RX ORDER — CYCLOBENZAPRINE HCL 10 MG
10 TABLET ORAL 3 TIMES DAILY PRN
Qty: 90 TABLET | Refills: 5 | Status: SHIPPED | OUTPATIENT
Start: 2018-11-29 | End: 2019-03-11 | Stop reason: SDUPTHER

## 2018-11-29 RX ORDER — PREGABALIN 300 MG/1
CAPSULE ORAL
Qty: 60 CAPSULE | Refills: 5 | Status: SHIPPED | OUTPATIENT
Start: 2018-11-29 | End: 2019-03-11 | Stop reason: SDUPTHER

## 2018-11-29 RX ORDER — LISINOPRIL 20 MG/1
TABLET ORAL
Qty: 30 TABLET | Refills: 5 | Status: SHIPPED | OUTPATIENT
Start: 2018-11-29 | End: 2019-03-11 | Stop reason: SDUPTHER

## 2018-11-29 RX ORDER — FENOFIBRATE 54 MG/1
54 TABLET ORAL DAILY
Qty: 30 TABLET | Refills: 5 | Status: SHIPPED | OUTPATIENT
Start: 2018-11-29 | End: 2019-03-11 | Stop reason: SDUPTHER

## 2018-11-29 RX ADMIN — KETOROLAC TROMETHAMINE 60 MG: 30 INJECTION, SOLUTION INTRAMUSCULAR; INTRAVENOUS at 12:33

## 2018-11-29 ASSESSMENT — ENCOUNTER SYMPTOMS
CHOKING: 0
PHOTOPHOBIA: 0
SORE THROAT: 0
COUGH: 0
SINUS PRESSURE: 0
COLOR CHANGE: 0
RECTAL PAIN: 0
VOMITING: 0
RHINORRHEA: 0
ABDOMINAL PAIN: 0
TROUBLE SWALLOWING: 0
EYE PAIN: 0
SINUS PAIN: 0
EYE ITCHING: 0
DIARRHEA: 0
CHEST TIGHTNESS: 0
VOICE CHANGE: 0
NAUSEA: 0
EYE REDNESS: 0
APNEA: 0
EYE DISCHARGE: 0
STRIDOR: 0
CONSTIPATION: 0
WHEEZING: 0
FACIAL SWELLING: 0
SHORTNESS OF BREATH: 0
ABDOMINAL DISTENTION: 0

## 2018-11-29 NOTE — PROGRESS NOTES
Joel Dias is a 37 y.o. male who presents today for   Chief Complaint   Patient presents with    Diabetes    Hypertension    Hyperlipidemia    Back Pain     chronic/neuropathic pain         HPI    Treatment Adherence:   Medication compliance:  compliant all of the time  Diet compliance:  compliant most of the time  Weight trend: stable  Current exercise: no regular exercise  Barriers: impairment:  physical: Chronic Lumbar Radiculopathy-follows with Neurology-Dr. Bhatt    Diabetes Mellitus Type 2: Current symptoms/problems include none. Home blood sugar records: trend: fluctuating a bit  Any episodes of hypoglycemia? no  Eye exam current (within one year): yes  Tobacco history: He  reports that he has never smoked. His smokeless tobacco use includes Chew. Daily Aspirin? Yes  A1C today is 7.8%-unchanged form 3 months ago, will continue current treatment plan and advised to follow diet, advised Goal A1C is 7.0%    Hypertension:  Home blood pressure monitoring: No.  He is adherent to a low sodium diet. Patient denies chest pain, shortness of breath, headache, lightheadedness, blurred vision and peripheral edema. Antihypertensive medication side effects: no medication side effects noted. Use of agents associated with hypertension: none. Hyperlipidemia:  No new myalgias or GI upset on pravastatin (Pravachol).        Lab Results   Component Value Date    LABA1C 7.8 11/29/2018    LABA1C 7.8 08/22/2018    LABA1C 7.0 05/21/2018     Lab Results   Component Value Date    LABMICR <12.0 08/12/2016    CREATININE 1.0 08/24/2018     Lab Results   Component Value Date    ALT 33 08/24/2018    AST 23 08/24/2018     Lab Results   Component Value Date    CHOL 204 (H) 08/24/2018    TRIG 301 (H) 08/24/2018    HDL 32 08/24/2018    LDLCALC 112 (H) 08/24/2018          Lumbar Radiculopathy/Neuropathic Pain  Pt has a h/o chronic radiculopathy and neuropathic pain, he is compliant with Lyrica and Flexeril as ordered. He will need a refill today. He denies bowel/bladder issues, he is following with Neurology-Dr. Samantha Richard, appears Lumbar MRI will be ordered according to Neurology. Pt is requesting Toradol injection today for pain, pt has received injections in the past and tolerated well      Pt has no other concerns    [address]:  Patient's past medical, surgical, social and/or family history reviewed, updated in chart, and are non-contributory (unless otherwise stated). Medications and allergies also reviewed and updated in chart. Review of Systems  Review of Systems   Constitutional: Negative for activity change, appetite change, chills, diaphoresis, fatigue, fever and unexpected weight change. HENT: Negative for congestion, ear discharge, ear pain, facial swelling, hearing loss, mouth sores, nosebleeds, postnasal drip, rhinorrhea, sinus pain, sinus pressure, sneezing, sore throat, tinnitus, trouble swallowing and voice change. Eyes: Negative for photophobia, pain, discharge, redness, itching and visual disturbance. Respiratory: Negative for apnea, cough, choking, chest tightness, shortness of breath, wheezing and stridor. Cardiovascular: Negative for chest pain, palpitations and leg swelling. Gastrointestinal: Negative for abdominal distention, abdominal pain, constipation, diarrhea, nausea, rectal pain and vomiting. Endocrine: Negative for cold intolerance, heat intolerance, polydipsia, polyphagia and polyuria. Genitourinary: Negative for difficulty urinating, dysuria, enuresis, flank pain, frequency, hematuria and urgency. Musculoskeletal: Negative for myalgias. H/o Chronic Lumbar Radiculopathy   Skin: Negative for color change, pallor, rash and wound. Neurological: Negative for dizziness, tremors, seizures, syncope, facial asymmetry, speech difficulty, weakness, light-headedness and headaches.         H/o Neuropathic pain d/t lumbar radiculopathy   Psychiatric/Behavioral: Negative for dysphoric mood, self-injury, sleep disturbance (h/o Insomnia) and suicidal ideas. The patient is not nervous/anxious. Physical Exam:    VS:  /88 (Site: Right Upper Arm)   Pulse 131   Temp 97.2 °F (36.2 °C) (Temporal)   Resp 16   Ht 6' 2\" (1.88 m)   Wt 299 lb (135.6 kg)   SpO2 99%   BMI 38.39 kg/m²   LAST WEIGHT:  Wt Readings from Last 3 Encounters:   11/29/18 299 lb (135.6 kg)   11/29/18 290 lb (131.5 kg)   08/22/18 (!) 304 lb (137.9 kg)     Physical Exam   Constitutional: He is oriented to person, place, and time. He appears well-developed and well-nourished. Obese   HENT:   Head: Normocephalic and atraumatic. Right Ear: External ear normal.   Nose: Nose normal.   Mouth/Throat: Oropharynx is clear and moist. No oropharyngeal exudate. Eyes: Pupils are equal, round, and reactive to light. Conjunctivae and EOM are normal. Right eye exhibits no discharge. Left eye exhibits no discharge. Neck: Normal range of motion. Neck supple. No JVD present. Cardiovascular: Normal rate, regular rhythm, normal heart sounds and intact distal pulses. No murmur heard. No peripheral edema   Pulmonary/Chest: Effort normal and breath sounds normal. No respiratory distress. He has no wheezes. He has no rales. He exhibits no tenderness. Abdominal: Soft. Bowel sounds are normal. He exhibits no distension and no mass. There is no tenderness. There is no rebound and no guarding. Musculoskeletal:        Right foot: There is normal range of motion and no deformity. Left foot: There is normal range of motion and no deformity. Mild/moderate lumbar spine and paraspinal muscle tenderness bilaterally. No erythema/edema or deformities present, no contusions present. Slow in changing positions from sitting to standing, Decreased ROM, walks with cane       Feet:   Right Foot:   Protective Sensation: 5 sites tested. 5 sites sensed. Skin Integrity: Positive for callus and dry skin.  Negative for ulcer, DULoxetine (CYMBALTA) 30 MG extended release capsule; Take 1 capsule by mouth daily    Lumbar radiculopathy  As above  -     pregabalin (LYRICA) 300 MG capsule; TAKE ONE CAPSULE BY MOUTH TWICE DAILY. -     cyclobenzaprine (FLEXERIL) 10 MG tablet; Take 1 tablet by mouth 3 times daily as needed for Muscle spasms  -     DULoxetine (CYMBALTA) 30 MG extended release capsule; Take 1 capsule by mouth daily  -     ketorolac (TORADOL) injection 60 mg; Inject 2 mLs into the muscle once         Controlled Substances Monitoring:     RX Monitoring 11/29/2018   Attestation The Prescription Monitoring Report for this patient was reviewed today. Documentation Random urine drug screen sent today. Chronic Pain Treatment objectives documented - patient is progressing appropriately. Medication Contracts Medication contract signed today. Call or go to ED immediately if symptoms worsen or persist.    Return in about 3 months (around 2/28/2019) for f/u DM/HTN/Hyperlipidemia. , or sooner if necessary. Educational materials and/or home exercises printed for patient's review and were included in patient instructions on his/her After Visit Summary and given to patient at the end of visit. Counseled regarding above diagnosis, including possible risks and complications,  especially if left uncontrolled. Counseled regarding the possible side effects, risks, benefits and alternatives to treatment; patient and/or guardian verbalizes understanding, agrees, feels comfortable with and wishes to proceed with above treatment plan. Advised patient to call with any new medication issues, and read all Rx info from pharmacy to assure aware of all possible risks and side effects of medication before taking. Reviewed age and gender appropriate health screening exams and vaccinations.   Advised patient regarding importance of keeping up with recommended health maintenance and to schedule as soon as possible if overdue, as this is important in assessing for undiagnosed pathology, especially cancer, as well as protecting against potentially harmful/life threatening disease. Patient and/or guardian verbalizes understanding and agrees with above counseling, assessment and plan. All questions answered.     Patrica Knox, APRN - CNP

## 2018-11-29 NOTE — PATIENT INSTRUCTIONS
diabetes. · Check with your doctor before you drink alcohol. Alcohol can cause your blood sugar to drop too low. Alcohol can also cause a bad reaction if you take certain diabetes medicines. Follow-up care is a key part of your treatment and safety. Be sure to make and go to all appointments, and call your doctor if you are having problems. It's also a good idea to know your test results and keep a list of the medicines you take. Where can you learn more? Go to https://[employer]. org and sign in to your [employer] account. Enter F609 in the [employer] box to learn more about \"Learning About Diabetes Food Guidelines. \"     If you do not have an account, please click on the \"Sign Up Now\" link. Current as of: December 7, 2017  Content Version: 11.8  © 6224-6031 Healthwise, [facility]. Care instructions adapted under license by [facility] ([facility]). If you have questions about a medical condition or this instruction, always ask your healthcare professional. [address] any warranty or liability for your use of this information. Patient Education        DASH Diet: Care Instructions  Your Care Instructions    The DASH diet is an eating plan that can help lower your blood pressure. DASH stands for Dietary Approaches to Stop Hypertension. Hypertension is high blood pressure. The DASH diet focuses on eating foods that are high in calcium, potassium, and magnesium. These nutrients can lower blood pressure. The foods that are highest in these nutrients are fruits, vegetables, low-fat dairy products, nuts, seeds, and legumes. But taking calcium, potassium, and magnesium supplements instead of eating foods that are high in those nutrients does not have the same effect. The DASH diet also includes whole grains, fish, and poultry. The DASH diet is one of several lifestyle changes your doctor may recommend to lower your high blood pressure.  Your doctor may also want you to decrease the amount of sodium in your diet. Lowering sodium while following the DASH diet can lower blood pressure even further than just the DASH diet alone. Follow-up care is a key part of your treatment and safety. Be sure to make and go to all appointments, and call your doctor if you are having problems. It's also a good idea to know your test results and keep a list of the medicines you take. How can you care for yourself at home? Following the DASH diet  · Eat 4 to 5 servings of fruit each day. A serving is 1 medium-sized piece of fruit, ½ cup chopped or canned fruit, 1/4 cup dried fruit, or 4 ounces (½ cup) of fruit juice. Choose fruit more often than fruit juice. · Eat 4 to 5 servings of vegetables each day. A serving is 1 cup of lettuce or raw leafy vegetables, ½ cup of chopped or cooked vegetables, or 4 ounces (½ cup) of vegetable juice. Choose vegetables more often than vegetable juice. · Get 2 to 3 servings of low-fat and fat-free dairy each day. A serving is 8 ounces of milk, 1 cup of yogurt, or 1 ½ ounces of cheese. · Eat 6 to 8 servings of grains each day. A serving is 1 slice of bread, 1 ounce of dry cereal, or ½ cup of cooked rice, pasta, or cooked cereal. Try to choose whole-grain products as much as possible. · Limit lean meat, poultry, and fish to 2 servings each day. A serving is 3 ounces, about the size of a deck of cards. · Eat 4 to 5 servings of nuts, seeds, and legumes (cooked dried beans, lentils, and split peas) each week. A serving is 1/3 cup of nuts, 2 tablespoons of seeds, or ½ cup of cooked beans or peas. · Limit fats and oils to 2 to 3 servings each day. A serving is 1 teaspoon of vegetable oil or 2 tablespoons of salad dressing. · Limit sweets and added sugars to 5 servings or less a week. A serving is 1 tablespoon jelly or jam, ½ cup sorbet, or 1 cup of lemonade. · Eat less than 2,300 milligrams (mg) of sodium a day.  If you limit your sodium to 1,500 mg a day, you can

## 2019-03-11 ENCOUNTER — HOSPITAL ENCOUNTER (OUTPATIENT)
Age: 44
Discharge: HOME | End: 2019-03-13
Payer: COMMERCIAL

## 2019-03-11 ENCOUNTER — OFFICE VISIT (OUTPATIENT)
Dept: FAMILY MEDICINE CLINIC | Age: 44
End: 2019-03-11
Payer: COMMERCIAL

## 2019-03-11 VITALS
HEART RATE: 128 BPM | BODY MASS INDEX: 39.4 KG/M2 | HEIGHT: 74 IN | OXYGEN SATURATION: 97 % | RESPIRATION RATE: 16 BRPM | WEIGHT: 307 LBS | SYSTOLIC BLOOD PRESSURE: 114 MMHG | TEMPERATURE: 96.8 F | DIASTOLIC BLOOD PRESSURE: 82 MMHG

## 2019-03-11 DIAGNOSIS — E78.2 MIXED HYPERLIPIDEMIA: ICD-10-CM

## 2019-03-11 DIAGNOSIS — M54.16 LUMBAR RADICULOPATHY: ICD-10-CM

## 2019-03-11 DIAGNOSIS — E11.9 TYPE 2 DIABETES MELLITUS WITHOUT COMPLICATION, WITHOUT LONG-TERM CURRENT USE OF INSULIN (HCC): ICD-10-CM

## 2019-03-11 DIAGNOSIS — I10 ESSENTIAL HYPERTENSION: ICD-10-CM

## 2019-03-11 DIAGNOSIS — M79.2 NEUROPATHIC PAIN: ICD-10-CM

## 2019-03-11 LAB
ALBUMIN SERPL-MCNC: 4.3 G/DL (ref 3.5–5.2)
ALP BLD-CCNC: 124 U/L (ref 40–129)
ALT SERPL-CCNC: 34 U/L (ref 0–40)
ANION GAP SERPL CALCULATED.3IONS-SCNC: 15 MMOL/L (ref 7–16)
AST SERPL-CCNC: 26 U/L (ref 0–39)
BASOPHILS ABSOLUTE: 0.07 E9/L (ref 0–0.2)
BASOPHILS RELATIVE PERCENT: 0.8 % (ref 0–2)
BILIRUB SERPL-MCNC: 0.3 MG/DL (ref 0–1.2)
BUN BLDV-MCNC: 21 MG/DL (ref 6–20)
CALCIUM SERPL-MCNC: 9.9 MG/DL (ref 8.6–10.2)
CHLORIDE BLD-SCNC: 99 MMOL/L (ref 98–107)
CHOLESTEROL, TOTAL: 201 MG/DL (ref 0–199)
CO2: 23 MMOL/L (ref 22–29)
CREAT SERPL-MCNC: 1.1 MG/DL (ref 0.7–1.2)
EOSINOPHILS ABSOLUTE: 0.35 E9/L (ref 0.05–0.5)
EOSINOPHILS RELATIVE PERCENT: 4 % (ref 0–6)
GFR AFRICAN AMERICAN: >60
GFR NON-AFRICAN AMERICAN: >60 ML/MIN/1.73
GLUCOSE BLD-MCNC: 216 MG/DL (ref 74–99)
HBA1C MFR BLD: 9.6 %
HCT VFR BLD CALC: 49.4 % (ref 37–54)
HDLC SERPL-MCNC: 35 MG/DL
HEMOGLOBIN: 16.6 G/DL (ref 12.5–16.5)
IMMATURE GRANULOCYTES #: 0.03 E9/L
IMMATURE GRANULOCYTES %: 0.3 % (ref 0–5)
LDL CHOLESTEROL CALCULATED: 105 MG/DL (ref 0–99)
LYMPHOCYTES ABSOLUTE: 1.73 E9/L (ref 1.5–4)
LYMPHOCYTES RELATIVE PERCENT: 19.7 % (ref 20–42)
MCH RBC QN AUTO: 29.7 PG (ref 26–35)
MCHC RBC AUTO-ENTMCNC: 33.6 % (ref 32–34.5)
MCV RBC AUTO: 88.4 FL (ref 80–99.9)
MONOCYTES ABSOLUTE: 0.53 E9/L (ref 0.1–0.95)
MONOCYTES RELATIVE PERCENT: 6 % (ref 2–12)
NEUTROPHILS ABSOLUTE: 6.09 E9/L (ref 1.8–7.3)
NEUTROPHILS RELATIVE PERCENT: 69.2 % (ref 43–80)
PDW BLD-RTO: 12.2 FL (ref 11.5–15)
PLATELET # BLD: 286 E9/L (ref 130–450)
PMV BLD AUTO: 10.5 FL (ref 7–12)
POTASSIUM SERPL-SCNC: 5.1 MMOL/L (ref 3.5–5)
RBC # BLD: 5.59 E12/L (ref 3.8–5.8)
SODIUM BLD-SCNC: 137 MMOL/L (ref 132–146)
TOTAL PROTEIN: 7.4 G/DL (ref 6.4–8.3)
TRIGL SERPL-MCNC: 307 MG/DL (ref 0–149)
TSH SERPL DL<=0.05 MIU/L-ACNC: 1.56 UIU/ML (ref 0.27–4.2)
VLDLC SERPL CALC-MCNC: 61 MG/DL
WBC # BLD: 8.8 E9/L (ref 4.5–11.5)

## 2019-03-11 PROCEDURE — 85025 COMPLETE CBC W/AUTO DIFF WBC: CPT

## 2019-03-11 PROCEDURE — 83036 HEMOGLOBIN GLYCOSYLATED A1C: CPT | Performed by: NURSE PRACTITIONER

## 2019-03-11 PROCEDURE — 99214 OFFICE O/P EST MOD 30 MIN: CPT | Performed by: NURSE PRACTITIONER

## 2019-03-11 PROCEDURE — 84443 ASSAY THYROID STIM HORMONE: CPT

## 2019-03-11 PROCEDURE — 96372 THER/PROPH/DIAG INJ SC/IM: CPT | Performed by: NURSE PRACTITIONER

## 2019-03-11 PROCEDURE — 80053 COMPREHEN METABOLIC PANEL: CPT

## 2019-03-11 PROCEDURE — 80061 LIPID PANEL: CPT

## 2019-03-11 RX ORDER — DULOXETIN HYDROCHLORIDE 30 MG/1
30 CAPSULE, DELAYED RELEASE ORAL DAILY
Qty: 30 CAPSULE | Refills: 5 | Status: SHIPPED | OUTPATIENT
Start: 2019-03-11 | End: 2019-05-15 | Stop reason: SDUPTHER

## 2019-03-11 RX ORDER — FENOFIBRATE 54 MG/1
54 TABLET ORAL DAILY
Qty: 30 TABLET | Refills: 5 | Status: SHIPPED | OUTPATIENT
Start: 2019-03-11 | End: 2019-05-15 | Stop reason: SDUPTHER

## 2019-03-11 RX ORDER — LISINOPRIL 20 MG/1
TABLET ORAL
Qty: 30 TABLET | Refills: 5 | Status: SHIPPED | OUTPATIENT
Start: 2019-03-11 | End: 2019-05-15 | Stop reason: SDUPTHER

## 2019-03-11 RX ORDER — CYCLOBENZAPRINE HCL 10 MG
10 TABLET ORAL 3 TIMES DAILY PRN
Qty: 90 TABLET | Refills: 1 | Status: SHIPPED | OUTPATIENT
Start: 2019-03-11 | End: 2019-05-15 | Stop reason: SDUPTHER

## 2019-03-11 RX ORDER — KETOROLAC TROMETHAMINE 30 MG/ML
60 INJECTION, SOLUTION INTRAMUSCULAR; INTRAVENOUS ONCE
Status: COMPLETED | OUTPATIENT
Start: 2019-03-11 | End: 2019-03-11

## 2019-03-11 RX ORDER — PREGABALIN 300 MG/1
CAPSULE ORAL
Qty: 60 CAPSULE | Refills: 5 | Status: SHIPPED | OUTPATIENT
Start: 2019-03-11 | End: 2019-05-15 | Stop reason: SDUPTHER

## 2019-03-11 RX ORDER — PRAVASTATIN SODIUM 20 MG
TABLET ORAL
Qty: 30 TABLET | Refills: 5 | Status: SHIPPED | OUTPATIENT
Start: 2019-03-11 | End: 2019-05-15 | Stop reason: SDUPTHER

## 2019-03-11 RX ADMIN — KETOROLAC TROMETHAMINE 60 MG: 30 INJECTION, SOLUTION INTRAMUSCULAR; INTRAVENOUS at 14:03

## 2019-03-11 ASSESSMENT — ENCOUNTER SYMPTOMS
FACIAL SWELLING: 0
VOMITING: 0
ABDOMINAL PAIN: 0
CHOKING: 0
EYE PAIN: 0
EYE DISCHARGE: 0
SINUS PAIN: 0
CONSTIPATION: 0
CHEST TIGHTNESS: 0
COUGH: 0
RHINORRHEA: 0
NAUSEA: 0
SORE THROAT: 0
DIARRHEA: 0
SHORTNESS OF BREATH: 0
VOICE CHANGE: 0
COLOR CHANGE: 0
SINUS PRESSURE: 0
PHOTOPHOBIA: 0
ABDOMINAL DISTENTION: 0
RECTAL PAIN: 0
TROUBLE SWALLOWING: 0
STRIDOR: 0
APNEA: 0
WHEEZING: 0
EYE ITCHING: 0
EYE REDNESS: 0

## 2019-03-11 ASSESSMENT — PATIENT HEALTH QUESTIONNAIRE - PHQ9
SUM OF ALL RESPONSES TO PHQ QUESTIONS 1-9: 0
SUM OF ALL RESPONSES TO PHQ9 QUESTIONS 1 & 2: 0
2. FEELING DOWN, DEPRESSED OR HOPELESS: 0
1. LITTLE INTEREST OR PLEASURE IN DOING THINGS: 0
SUM OF ALL RESPONSES TO PHQ QUESTIONS 1-9: 0

## 2019-04-29 ENCOUNTER — HOSPITAL ENCOUNTER (OUTPATIENT)
Dept: NEUROLOGY | Age: 44
Discharge: HOME | End: 2019-04-29
Payer: COMMERCIAL

## 2019-04-29 VITALS — BODY MASS INDEX: 39.23 KG/M2 | WEIGHT: 296 LBS | HEIGHT: 73 IN

## 2019-04-29 PROCEDURE — 95886 MUSC TEST DONE W/N TEST COMP: CPT

## 2019-04-29 PROCEDURE — 95912 NRV CNDJ TEST 11-12 STUDIES: CPT

## 2019-04-29 NOTE — LETTER
Nerve / Sites Onset Lat Peak Lat PP Amp Distance Peak Diff Velocity Temp.     ms ms µV cm ms m/s °C   R Superficial peroneal - Ankle      Lat leg 1.51 2.19 10.5 10   66 32.1   L Superficial peroneal - Ankle      Lat leg 1.46 1.77 7.8 10   69 31.8   R Sural - Ankle (Calf)      Calf 2.45 3.91 1.8 14   57 32   L Sural - Ankle (Calf)      Calf 2.50 2.76 8.9 14   56 31.7   R Medial plantar, Lateral plantar - Ankle (Medial, lateral sole)      Medial plantar Sole 2.40 2.76 3.3 14   58 31.3      Lateral plantar Sole 2.29 2.60 2.4 14   61 31.3             0.16   31.3   L Medial plantar, Lateral plantar - Ankle (Medial, lateral sole)      Medial plantar Sole 2.76 3.54 9.2 14   51 31      Lateral plantar Sole 2.24 2.45 8.5 14   63 31             1.09   31       F  Wave      Nerve F Lat M Lat F-M Lat     ms ms ms   R Peroneal - EDB 63.1 4.7 58.4   R Tibial - AH 64.3 4.6 59.6   L Peroneal - EDB 63.1 0.0 63.1   L Tibial - AH 64.1 5.1 59.0       H Reflex      Nerve Lat Hmax     ms   R Tibial - Soleus 38.9   L Tibial - Soleus 39. 2       EMG                     EMG Summary Table       Spontaneous MUAP Recruitment   Muscle IA Fib PSW Fasc H.F. Amp Dur. PPP Pattern   L. Lumbar paraspinals (low) N None None None None N 1+ 1+ N   L. Vastus medialis N None None None None N 1+ N N   L. Gastrocnemius (Medial head) Incr None 1+ None +1 Serrated 1+ 1+ 1+ N   L. Tibialis anterior N 1+ 1+ None +1 Serrated 1+ 1+ 1+ N   L. Flexor digitorum longus N None None None None 1+ 1+ 2+ N   R. Lumbar paraspinals (low) N None None None None N 1+ 1+ N   R. Vastus medialis N None None None None N 1+ N N   R. Gastrocnemius (Medial head) N None None None +1 Serrated 1+ 1+ 2+ N   R. Tibialis anterior N None None None None 1+ 1+ 1+ N   R. Flexor digitorum longus N None None None None 1+ 1+ 1+ N   R. Extensor hallucis longus N None None None +1 Serrated 1+ 1+ 1+ N   R.  Dorsal interossei (pedis) Incr 2+ 2+ None +1 Serrated 1+ 1+ 2+ Decr R. Abductor hallucis Incr 2+ 2+ None +1 Serrated 1+ 1+ 1+ N           This is the 1st electrodiagnostic study for Mr. Daysi Castellanos. He voices consent. There are no contraindications.        Summary:  Nerve conduction studies in the lower extremities reveal normal mixed motor latency of the peroneal and tibial nerve bilaterally. Amplitude of the peroneal nerve bilaterally within normal limits. Velocity is slowed bilaterally. Tibial nerve amplitude bilaterally reduced, velocity is also slowed. Mixed motor latency upper limits of normal on the left and normal on the right tibial..     Sensory studies in the lower extremities peak latency of the superficial peroneal and sural nerves, within normal limits. Amplitude of the right sural markedly reduced.     Bilateral medial and lateral plantar branches were also evaluated, there was prolongation of the left medial plantar peak latency.     F responses were all prolonged for the peroneal and tibial nerves bilaterally. In addition, H reflex prolonged bilaterally. .     Insertional activity in the lower extremity revealing changes of a neuropathic nature in all sampled musculature in the lower extremity. Positive waves and fibrillation potentials found in the 1st dorsal interosseous and the hallucis brevis in the right foot. Please refer to the above chart for specifics and results of the examination. Lumbar paraspinals were also evaluated. .     Impression:  Study compatible with following:     #1 results are highly suggestive of left tarsal tunnel. Insertional examination also suggests right tarsal tunnel. Clinical correlation is recommended     #2 evidence of peripheral neuropathy, predominantly mylin pathology, with some axonal loss.     #3 no changes in the posterior rami groups that would be diagnostic of a lumbar, motor radiculopathy.  Again clinical correlation recommended     Thank you     Clinical correlation recommended.          Electronically signed by Linda Hurd MD on 5/53/0629 at 3:56 PM                          History:  Catherine Alfaro complains of a two-year history of progressive \"pins and needles\" affecting his toes and feet. Is progressed to involve the lower aspect of his legs, sometimes \"up to my knees and above\". He also describes some weakness in his lower extremities and does have history of frequent also balance. Discomfort is present in the lower back radiating into the buttocks bilaterally. He has had imaging in the way of MRIs, demonstrating \"bad discs\". He denies any bowel or bladder dysfunction. He has had a two-year history of diabetes. .     ROS:   We see the above history. In addition he complains of arthritis and his had a double hip replacement in 2016. He also describes a central obesity, poor endurance Tori difficulty with sleeping on her remainder review systems negative    Meds:    Prior to Admission medications    Medication Sig Start Date End Date Taking?  Authorizing Provider   cyclobenzaprine (FLEXERIL) 10 MG tablet Take 1 tablet by mouth 3 times daily as needed for Muscle spasms 3/11/19   IGOR Spence CNP   DULoxetine (CYMBALTA) 30 MG extended release capsule Take 1 capsule by mouth daily 3/11/19   IGOR Spence CNP   fenofibrate (TRICOR) 54 MG tablet Take 1 tablet by mouth daily [facility] pharmacy: Please dispense generic fenofibrate unless prescriber denote 3/11/19   IGOR Spence CNP   lisinopril (PRINIVIL;ZESTRIL) 20 MG tablet take 1 tablet by mouth once daily 3/11/19   IGOR Spence CNP   metFORMIN (GLUCOPHAGE) 500 MG tablet take 2 tablets twice a day with meals 3/11/19   IGOR Spence CNP   pravastatin (PRAVACHOL) 20 MG tablet take 1 tablet by mouth once daily 3/11/19   IGOR Spence CNP   pregabalin (LYRICA) 300 MG capsule TAKE ONE CAPSULE BY MOUTH TWICE DAILY 3/11/19 6/8/19  IGOR Spence CNP aspirin (SM ASPIRIN ADULT LOW STRENGTH) 81 MG EC tablet take 1 tablet by mouth once daily 11/29/18   IGOR Ureña CNP   fluticasone [facility]) 50 MCG/ACT nasal spray 2 sprays by Nasal route daily 11/29/18   IGOR Ureña CNP   Loratadine (CLARITIN PO) Take 1 tablet by mouth as needed     Historical Provider, MD       PMH:     Past Medical History:   Diagnosis Date    SIMÓN (acute kidney injury) ([facility] [address] 2008 apx    kidney bruised due to fall / [place]    Allergic rhinitis     Chronic back pain     Depression     Diabetes mellitus ([facility] [address]     Difficulty sleeping     at times    Displacement of lumbar intervertebral disc without myelopathy     Fibromyalgia     Fractured rib     2008 / healed    H/O seasonal allergies     Head injury 1980'S apx    no residual s/s    Hyperlipidemia     Hypertension     Obesity (BMI 35.0-39.9 without comorbidity)     bmi 39.2  weight 296 #    Osteoarthritis     Thoracic or lumbosacral neuritis or radiculitis, unspecified        PSH:    Past Surgical History:   Procedure Laterality Date    FOOT SURGERY Right 1985    to treat shattered bones    HERNIA REPAIR  2001    DOUBLE HERNIA    HIP ARTHROPLASTY Left 4/11/2016    NECK SURGERY  2000    FUSION    TX COLONOSCOPY FLX DX W/COLLJ SPEC WHEN PFRMD N/A 5/7/2018    COLONOSCOPY DIAGNOSTIC performed by Nikolai Driscoll MD at [address] [place] [address] Left 2014    Dr. Jennyfer Jim ARTHROSCOPY Left [address] [facility] SURGERY  2001 &2007    RIGHT AND LEFT repair of tears    TOTAL HIP ARTHROPLASTY Right 10/31/2016    Total right hip  Liliana Hernández MD    [facility] WRIST SURGERY  2007    LEFT WRIST       Social History:    Social History     Socioeconomic History    Marital status:      Spouse name: Not on file    Number of children: Not on file    Years of education: Not on file    Highest education level: Not on file   Occupational History    Not on file   Social Needs  Financial resource strain: Not on file   [place]-Jen[place] insecurity:     Worry: Not on file     Inability: Not on file    Transportation needs:     Medical: Not on file     Non-medical: Not on file   Tobacco Use    Smoking status: Never Smoker    Smokeless tobacco: Current User     Types: Chew   Substance and Sexual Activity    Alcohol use: No     Alcohol/week: 0.0 oz    Drug use: No    Sexual activity: Not on file   Lifestyle    Physical activity:     Days per week: Not on file     Minutes per session: Not on file    Stress: Not on file   Relationships    Social connections:     Talks on phone: Not on file     Gets together: Not on file     Attends Baptism service: Not on file     Active member of club or organization: Not on file     Attends meetings of clubs or organizations: Not on file     Relationship status: Not on file    Intimate partner violence:     Fear of current or ex partner: Not on file     Emotionally abused: Not on file     Physically abused: Not on file     Forced sexual activity: Not on file   Other Topics Concern    Not on file   Social History Narrative    Not on file       Family History:    Family History   Problem Relation Age of Onset    Hypertension Mother     Arthritis Father     Diabetes Father     Cancer Maternal Grandfather         Skin    Cancer Paternal Uncle         skin    Diabetes Paternal Grandfather     Heart Disease Maternal Grandmother     Stroke Maternal Aunt        Exam:  Exam reveals a 25-year-old male 6 foot 1 inch 296 pounds. Cognitively intact. Skin:  Skin in the lower extremity is thin in nature, there are several areas of breakdown related to skin irritation and insect bites that have healed poorly. No open seeping wounds. Areas suggesting stasis dermatitis. Coldness is noted distally. Unable to palpate the dorsal pulse    Motor examination in the lower extremities to manual muscle testing is G/and.. Sensory examination reveals a decreased sensation from knee to foot. .     Reflexes are absent in the lower extremities. Downgoing toes and no clonus. Impression: Please refer to the electrodiagnostic study results. There is evidence of peripheral neuropathy that is probably related to his diabetes as well as tarsal tunnel syndrome that should be clinically assessed. Thank you    If there are any questions, please contact me for discussion. Thank you once again for allowing me to participate along with you in his behalf.             Electronically signed by Prabhakar Cleaning MD on 5/87/7796 at 4:05 PM

## 2019-04-29 NOTE — PROCEDURES
[facility]  Electrodiagnostic Laboratory  [place]        Full Name: Waleska De La Cruz II Gender: Male  MRN: [id number] YOB: 1975  Location[de-identified] [facility] (01)      Visit Date: 4/29/2019 12:50  Age: 37 Years 10 Months Old  Examining Physician: Dr. Kelly Najera   Referring Physician: Dia Paredes DPM  Technician: Selene Samaniego   Height: 6 feet 1 inch  Weight: 296 lbs  Notes: Bilateral lowers; neuropathy vs tarsal tunnel      Motor NCS      Nerve / Sites Lat. Amplitude Amp. 1-2 Distance Lat Diff Velocity Temp.    ms mV % cm ms m/s °C   R Peroneal - EDB      Ankle 3.33 2.1 100 8   31.1      Fib head 15.00 1.6 74.7 40 11.67 34 32      Pop fossa 18.13 1.2 58.3 10 3.13 32 32   L Peroneal - EDB      Ankle 3.96 1.8 100 8   31      Fib head 16.77 1.4 76.3 42 12.81 33 31      Pop fossa 18.80 1.2 68.8 10 2.03 49 31   R Tibial - AH      Ankle 3.75 1.1 100 8   32.1      Pop fossa 18.75 0.6 55.7 46 15.00 31 32.1   L Tibial - AH      Ankle 4.58 0.8 100 8   31.6      Pop fossa 19.11 0.8 99.1 48 14.53 33 31.6       Sensory NCS      Nerve / Sites Onset Lat Peak Lat PP Amp Distance Peak Diff Velocity Temp.    ms ms µV cm ms m/s °C   R Superficial peroneal - Ankle      Lat leg 1.51 2.19 10.5 10  66 32.1   L Superficial peroneal - Ankle      Lat leg 1.46 1.77 7.8 10  69 31.8   R Sural - Ankle (Calf)      Calf 2.45 3.91 1.8 14  57 32   L Sural - Ankle (Calf)      Calf 2.50 2.76 8.9 14  56 31.7   R Medial plantar, Lateral plantar - Ankle (Medial, lateral sole)      Medial plantar Sole 2.40 2.76 3.3 14  58 31.3      Lateral plantar Sole 2.29 2.60 2.4 14  61 31.3        0.16  31.3   L Medial plantar, Lateral plantar - Ankle (Medial, lateral sole)      Medial plantar Sole 2.76 3.54 9.2 14  51 31      Lateral plantar Sole 2.24 2.45 8.5 14  63 31        1.09  31       F  Wave      Nerve F Lat M Lat F-M Lat    ms ms ms   R Peroneal - EDB 63.1 4.7 58.4   R Tibial - AH 64.3 4.6 59.6   L Peroneal - EDB 63.1 0.0 63.1   L Tibial - AH 64.1 5.1 59.0       H Reflex      Nerve Lat Hmax    ms   R Tibial - Soleus 38.9   L Tibial - Soleus 39. 2       EMG         EMG Summary Table     Spontaneous MUAP Recruitment   Muscle IA Fib PSW Fasc H.F. Amp Dur. PPP Pattern   L. Lumbar paraspinals (low) N None None None None N 1+ 1+ N   L. Vastus medialis N None None None None N 1+ N N   L. Gastrocnemius (Medial head) Incr None 1+ None +1 Serrated 1+ 1+ 1+ N   L. Tibialis anterior N 1+ 1+ None +1 Serrated 1+ 1+ 1+ N   L. Flexor digitorum longus N None None None None 1+ 1+ 2+ N   R. Lumbar paraspinals (low) N None None None None N 1+ 1+ N   R. Vastus medialis N None None None None N 1+ N N   R. Gastrocnemius (Medial head) N None None None +1 Serrated 1+ 1+ 2+ N   R. Tibialis anterior N None None None None 1+ 1+ 1+ N   R. Flexor digitorum longus N None None None None 1+ 1+ 1+ N   R. Extensor hallucis longus N None None None +1 Serrated 1+ 1+ 1+ N   R. Dorsal interossei (pedis) Incr 2+ 2+ None +1 Serrated 1+ 1+ 2+ Decr   R. Abductor hallucis Incr 2+ 2+ None +1 Serrated 1+ 1+ 1+ N          This is the 1st electrodiagnostic study for Mr. Adri Dia. He voices consent. There are no contraindications. Summary:  Nerve conduction studies in the lower extremities reveal normal mixed motor latency of the peroneal and tibial nerve bilaterally. Amplitude of the peroneal nerve bilaterally within normal limits. Velocity is slowed bilaterally. Tibial nerve amplitude bilaterally reduced, velocity is also slowed. Mixed motor latency upper limits of normal on the left and normal on the right tibial..    Sensory studies in the lower extremities peak latency of the superficial peroneal and sural nerves, within normal limits. Amplitude of the right sural markedly reduced. Bilateral medial and lateral plantar branches were also evaluated, there was prolongation of the left medial plantar peak latency.     F responses were all prolonged for the peroneal and tibial nerves bilaterally. In addition, H reflex prolonged bilaterally. .    Insertional activity in the lower extremity revealing changes of a neuropathic nature in all sampled musculature in the lower extremity. Positive waves and fibrillation potentials found in the 1st dorsal interosseous and the hallucis brevis in the right foot. Please refer to the above chart for specifics and results of the examination. Lumbar paraspinals were also evaluated. .    Impression:  Study compatible with following:    #1 results are highly suggestive of left tarsal tunnel. Insertional examination also suggests right tarsal tunnel. Clinical correlation is recommended    #2 evidence of peripheral neuropathy, predominantly mylin pathology, with some axonal loss. #3 no changes in the posterior rami groups that would be diagnostic of a lumbar, motor radiculopathy. Again clinical correlation recommended    Thank you    Clinical correlation recommended.         Electronically signed by Sue Laurent MD on 5/20/0936 at 3:56 PM

## 2019-05-01 ENCOUNTER — TELEPHONE (OUTPATIENT)
Dept: FAMILY MEDICINE CLINIC | Age: 44
End: 2019-05-01

## 2019-05-01 NOTE — TELEPHONE ENCOUNTER
Patient would like referral to Pain Management at [facility]. States Dr. Christina Hdz recommended he go there since he was not able to help with his pain in feet and also back pain.

## 2019-05-02 DIAGNOSIS — M54.16 LUMBAR RADICULOPATHY: Primary | ICD-10-CM

## 2019-05-15 ENCOUNTER — OFFICE VISIT (OUTPATIENT)
Dept: FAMILY MEDICINE CLINIC | Age: 44
End: 2019-05-15
Payer: COMMERCIAL

## 2019-05-15 VITALS
BODY MASS INDEX: 41.75 KG/M2 | WEIGHT: 315 LBS | TEMPERATURE: 98.2 F | DIASTOLIC BLOOD PRESSURE: 66 MMHG | SYSTOLIC BLOOD PRESSURE: 118 MMHG | HEIGHT: 73 IN | HEART RATE: 102 BPM | RESPIRATION RATE: 16 BRPM | OXYGEN SATURATION: 97 %

## 2019-05-15 DIAGNOSIS — M79.2 NEUROPATHIC PAIN: ICD-10-CM

## 2019-05-15 DIAGNOSIS — I10 ESSENTIAL HYPERTENSION: ICD-10-CM

## 2019-05-15 DIAGNOSIS — E11.9 TYPE 2 DIABETES MELLITUS WITHOUT COMPLICATION, WITHOUT LONG-TERM CURRENT USE OF INSULIN (HCC): ICD-10-CM

## 2019-05-15 DIAGNOSIS — M54.16 LUMBAR RADICULOPATHY: ICD-10-CM

## 2019-05-15 DIAGNOSIS — E78.2 MIXED HYPERLIPIDEMIA: ICD-10-CM

## 2019-05-15 PROCEDURE — 99214 OFFICE O/P EST MOD 30 MIN: CPT | Performed by: NURSE PRACTITIONER

## 2019-05-15 PROCEDURE — G8417 CALC BMI ABV UP PARAM F/U: HCPCS | Performed by: NURSE PRACTITIONER

## 2019-05-15 PROCEDURE — 3046F HEMOGLOBIN A1C LEVEL >9.0%: CPT | Performed by: NURSE PRACTITIONER

## 2019-05-15 PROCEDURE — 96372 THER/PROPH/DIAG INJ SC/IM: CPT | Performed by: NURSE PRACTITIONER

## 2019-05-15 PROCEDURE — 2022F DILAT RTA XM EVC RTNOPTHY: CPT | Performed by: NURSE PRACTITIONER

## 2019-05-15 PROCEDURE — G8427 DOCREV CUR MEDS BY ELIG CLIN: HCPCS | Performed by: NURSE PRACTITIONER

## 2019-05-15 PROCEDURE — 4004F PT TOBACCO SCREEN RCVD TLK: CPT | Performed by: NURSE PRACTITIONER

## 2019-05-15 RX ORDER — PRAVASTATIN SODIUM 20 MG
TABLET ORAL
Qty: 30 TABLET | Refills: 5 | Status: SHIPPED | OUTPATIENT
Start: 2019-05-15 | End: 2019-10-21 | Stop reason: SDUPTHER

## 2019-05-15 RX ORDER — FENOFIBRATE 54 MG/1
54 TABLET ORAL DAILY
Qty: 30 TABLET | Refills: 5 | Status: SHIPPED | OUTPATIENT
Start: 2019-05-15 | End: 2019-10-21 | Stop reason: SDUPTHER

## 2019-05-15 RX ORDER — CYCLOBENZAPRINE HCL 10 MG
10 TABLET ORAL 3 TIMES DAILY PRN
Qty: 90 TABLET | Refills: 1 | Status: SHIPPED | OUTPATIENT
Start: 2019-05-15 | End: 2019-10-21 | Stop reason: SDUPTHER

## 2019-05-15 RX ORDER — FLUTICASONE PROPIONATE 50 MCG
2 SPRAY, SUSPENSION (ML) NASAL DAILY
Qty: 1 BOTTLE | Refills: 5 | Status: SHIPPED | OUTPATIENT
Start: 2019-05-15 | End: 2019-10-21 | Stop reason: SDUPTHER

## 2019-05-15 RX ORDER — LORATADINE 10 MG/1
10 TABLET ORAL DAILY
Qty: 30 TABLET | Refills: 5 | Status: SHIPPED | OUTPATIENT
Start: 2019-05-15 | End: 2019-06-14

## 2019-05-15 RX ORDER — KETOROLAC TROMETHAMINE 30 MG/ML
60 INJECTION, SOLUTION INTRAMUSCULAR; INTRAVENOUS ONCE
Status: COMPLETED | OUTPATIENT
Start: 2019-05-15 | End: 2019-05-15

## 2019-05-15 RX ORDER — ASPIRIN 81 MG/1
TABLET ORAL
Qty: 30 TABLET | Refills: 5 | Status: SHIPPED | OUTPATIENT
Start: 2019-05-15 | End: 2019-10-21 | Stop reason: SDUPTHER

## 2019-05-15 RX ORDER — LISINOPRIL 20 MG/1
TABLET ORAL
Qty: 30 TABLET | Refills: 5 | Status: SHIPPED | OUTPATIENT
Start: 2019-05-15 | End: 2019-10-21 | Stop reason: SDUPTHER

## 2019-05-15 RX ORDER — DULOXETIN HYDROCHLORIDE 30 MG/1
30 CAPSULE, DELAYED RELEASE ORAL DAILY
Qty: 30 CAPSULE | Refills: 5 | Status: SHIPPED | OUTPATIENT
Start: 2019-05-15 | End: 2019-10-21 | Stop reason: SDUPTHER

## 2019-05-15 RX ORDER — PREGABALIN 300 MG/1
CAPSULE ORAL
Qty: 60 CAPSULE | Refills: 5 | Status: SHIPPED | OUTPATIENT
Start: 2019-05-15 | End: 2019-07-22 | Stop reason: SDUPTHER

## 2019-05-15 RX ADMIN — KETOROLAC TROMETHAMINE 60 MG: 30 INJECTION, SOLUTION INTRAMUSCULAR; INTRAVENOUS at 10:55

## 2019-05-15 NOTE — PROGRESS NOTES
Preoperative Consultation      Sheri Brooks II  YOB: 1975    Date of Service:  5/15/2019    Vitals:    05/15/19 0937   BP: 118/66   Site: Left Upper Arm   Position: Sitting   Cuff Size: Large Adult   Pulse: 102   Resp: 16   Temp: 98.2 °F (36.8 °C)   TempSrc: Oral   SpO2: 97%   Weight: (!) 315 lb (142.9 kg)   Height: 6' 1\" (1.854 m)      Wt Readings from Last 2 Encounters:   05/15/19 (!) 315 lb (142.9 kg)   04/29/19 296 lb (134.3 kg)     BP Readings from Last 3 Encounters:   05/15/19 118/66   03/11/19 114/82   11/29/18 134/88        Chief Complaint   Patient presents with    Pre-op Exam     Surgery schedule 6/13     Allergies   Allergen Reactions    Seasonal      HAYFEVER / sneezing,watery eyes    Tramadol Itching     Outpatient Medications Marked as Taking for the 5/15/19 encounter (Office Visit) with IGOR Best CNP   Medication Sig Dispense Refill    cyclobenzaprine (FLEXERIL) 10 MG tablet Take 1 tablet by mouth 3 times daily as needed for Muscle spasms 90 tablet 1    DULoxetine (CYMBALTA) 30 MG extended release capsule Take 1 capsule by mouth daily 30 capsule 5    fenofibrate (TRICOR) 54 MG tablet Take 1 tablet by mouth daily s [facility] pharmacy: Please dispense generic fenofibrate unless prescriber denote 30 tablet 5    lisinopril (PRINIVIL;ZESTRIL) 20 MG tablet take 1 tablet by mouth once daily 30 tablet 5    metFORMIN (GLUCOPHAGE) 500 MG tablet take 2 tablets twice a day with meals 120 tablet 5    pravastatin (PRAVACHOL) 20 MG tablet take 1 tablet by mouth once daily 30 tablet 5    pregabalin (LYRICA) 300 MG capsule TAKE ONE CAPSULE BY MOUTH TWICE DAILY 60 capsule 5    aspirin (SM ASPIRIN ADULT LOW STRENGTH) 81 MG EC tablet take 1 tablet by mouth once daily 30 tablet 5    fluticasone (FLONASE) 50 MCG/ACT nasal spray 2 sprays by Nasal route daily 1 Bottle 5    Loratadine (CLARITIN PO) Take 1 tablet by mouth as needed          This patient presents to the office today &2007    RIGHT AND LEFT repair of tears    TOTAL HIP ARTHROPLASTY Right 10/31/2016    Total right hip  MD Guy Inman WRIST SURGERY  2007    LEFT WRIST     Family History   Problem Relation Age of Onset    Hypertension Mother     Arthritis Father     Diabetes Father     Cancer Maternal Grandfather         Skin    Cancer Paternal Uncle         skin    Diabetes Paternal Grandfather     Heart Disease Maternal Grandmother     Stroke Maternal Aunt      Social History     Socioeconomic History    Marital status:      Spouse name: Not on file    Number of children: Not on file    Years of education: Not on file    Highest education level: Not on file   Occupational History    Not on file   Social Needs    Financial resource strain: Not on file    Food insecurity:     Worry: Not on file     Inability: Not on file    Transportation needs:     Medical: Not on file     Non-medical: Not on file   Tobacco Use    Smoking status: Never Smoker    Smokeless tobacco: Current User     Types: Chew   Substance and Sexual Activity    Alcohol use: No     Alcohol/week: 0.0 oz    Drug use: No    Sexual activity: Not on file   Lifestyle    Physical activity:     Days per week: Not on file     Minutes per session: Not on file    Stress: Not on file   Relationships    Social connections:     Talks on phone: Not on file     Gets together: Not on file     Attends Congregation service: Not on file     Active member of club or organization: Not on file     Attends meetings of clubs or organizations: Not on file     Relationship status: Not on file    Intimate partner violence:     Fear of current or ex partner: Not on file     Emotionally abused: Not on file     Physically abused: Not on file     Forced sexual activity: Not on file   Other Topics Concern    Not on file   Social History Narrative    Not on file       Review of Systems  Review of Systems   Constitutional: Negative for activity change, appetite change, chills, diaphoresis, fatigue, fever and unexpected weight change. HENT: Negative for congestion, ear discharge, ear pain, hearing loss, mouth sores, nosebleeds, postnasal drip, rhinorrhea, sinus pressure, sinus pain, sneezing, sore throat, tinnitus, trouble swallowing and voice change. Eyes: Negative for photophobia, pain, discharge, redness, itching and visual disturbance. Respiratory: Negative for apnea, cough, choking, chest tightness, shortness of breath, wheezing and stridor. Cardiovascular: Negative for chest pain, palpitations and leg swelling. Gastrointestinal: Negative for abdominal distention, abdominal pain, anal bleeding, blood in stool, constipation, diarrhea, nausea, rectal pain and vomiting. Endocrine: Negative for cold intolerance, heat intolerance, polydipsia, polyphagia and polyuria. H/o DM   Genitourinary: Negative for decreased urine volume, difficulty urinating, dysuria, enuresis, flank pain, frequency, hematuria and urgency. Musculoskeletal: Negative for myalgias. H/o chronic lumbar radiculopathy    Scheduled for Tarsal Tunnel Release Left Foot   Skin: Negative for color change, pallor and rash. Neurological: Negative for dizziness, tremors, seizures, syncope, facial asymmetry, speech difficulty, light-headedness and headaches. H/o Neuropathic Pain   Hematological: Negative for adenopathy. Does not bruise/bleed easily. Psychiatric/Behavioral: Negative for decreased concentration, dysphoric mood, self-injury, sleep disturbance and suicidal ideas. The patient is not nervous/anxious. Physical Exam   Constitutional: He is oriented to person, place, and time. He appears well-developed and well-nourished. No distress. HENT:   Head: Normocephalic and atraumatic.    Mouth/Throat: Uvula is midline, oropharynx is clear and moist and mucous membranes are normal.   Eyes: Conjunctivae and EOM are normal. Pupils are equal, round, and reactive to light. Neck: Trachea normal and normal range of motion. Neck supple. No JVD present. Carotid bruit is not present. No mass and no thyromegaly present. Cardiovascular: Normal rate, regular rhythm, normal heart sounds and intact distal pulses. Exam reveals no gallop and no friction rub. No murmur heard. Pulmonary/Chest: Effort normal and breath sounds normal. No respiratory distress. He has no wheezes. He has no rales. Abdominal: Soft. Normal aorta and bowel sounds are normal. He exhibits no distension and no mass. There is no hepatosplenomegaly. No tenderness. Musculoskeletal: Deferred to Podiatry  Neurological: He is alert and oriented to person, place, and time. He has normal strength. No cranial nerve deficit or sensory deficit. Coordination and gait normal.   Skin: Skin is warm and dry. No rash noted. No erythema. Psychiatric: He has a normal mood and affect. His behavior is normal.     Lab Review not applicable        Assessment:       37 y.o. patient with planned surgery as above. Known risk factors for perioperative complications: Diabetes mellitus, Hypertension  Current medications which may produce withdrawal symptoms if withheld perioperatively:       Plan:     1. Preoperative workup as follows: none  2. Change in medication regimen before surgery: Take mess on morning of surgery with sip of water, and hold all other medications until after surgery  3.  Prophylaxis for cardiac events with perioperative beta-blockers: Not indicated  ACC/AHA indications for pre-operative beta-blocker use:    · Vascular surgery with history of postitive stress test  · Intermediate or high risk surgery with history of CAD   · Intermediate or high risk surgery with multiple clinical predictors of CAD- 2 of the following: history of compensated or prior heart failure, history of cerebrovascular disease, DM, or renal insufficiency    Routine administration of higher-dose, long-acting metoprolol in beta-blocker-naïve patients on the day of surgery, and in the absence of dose titration is associated with an overall increase in mortality. Beta-blockers should be started days to weeks prior to surgery and titrated to pulse < 70.  4. Deep vein thrombosis prophylaxis: regimen to be chosen by surgical team  5.  No contraindications to planned surgery

## 2019-05-27 ASSESSMENT — ENCOUNTER SYMPTOMS
EYE ITCHING: 0
BLOOD IN STOOL: 0
DIARRHEA: 0
EYE REDNESS: 0
RECTAL PAIN: 0
NAUSEA: 0
RHINORRHEA: 0
COLOR CHANGE: 0
ABDOMINAL PAIN: 0
SINUS PRESSURE: 0
ANAL BLEEDING: 0
EYE PAIN: 0
SHORTNESS OF BREATH: 0
TROUBLE SWALLOWING: 0
ABDOMINAL DISTENTION: 0
PHOTOPHOBIA: 0
VOMITING: 0
CHEST TIGHTNESS: 0
CHOKING: 0
COUGH: 0
EYE DISCHARGE: 0
VOICE CHANGE: 0
SINUS PAIN: 0
WHEEZING: 0
STRIDOR: 0
APNEA: 0
SORE THROAT: 0
CONSTIPATION: 0

## 2019-06-30 ENCOUNTER — APPOINTMENT (OUTPATIENT)
Dept: ULTRASOUND IMAGING | Age: 44
DRG: 721 | End: 2019-06-30
Payer: COMMERCIAL

## 2019-06-30 ENCOUNTER — HOSPITAL ENCOUNTER (INPATIENT)
Age: 44
LOS: 3 days | Discharge: HOME | DRG: 721 | End: 2019-07-05
Attending: EMERGENCY MEDICINE | Admitting: INTERNAL MEDICINE
Payer: COMMERCIAL

## 2019-06-30 ENCOUNTER — APPOINTMENT (OUTPATIENT)
Dept: GENERAL RADIOLOGY | Age: 44
DRG: 721 | End: 2019-06-30
Payer: COMMERCIAL

## 2019-06-30 DIAGNOSIS — T81.49XA SURGICAL SITE INFECTION: ICD-10-CM

## 2019-06-30 DIAGNOSIS — L03.116 CELLULITIS OF LEFT FOOT: Primary | ICD-10-CM

## 2019-06-30 PROBLEM — R65.10 SIRS (SYSTEMIC INFLAMMATORY RESPONSE SYNDROME) (HCC): Status: ACTIVE | Noted: 2019-06-30

## 2019-06-30 PROBLEM — L03.119 CELLULITIS OF FOOT: Status: ACTIVE | Noted: 2019-06-30

## 2019-06-30 PROBLEM — M79.89 LEFT LEG SWELLING: Status: ACTIVE | Noted: 2019-06-30

## 2019-06-30 PROBLEM — G62.9 NEUROPATHY: Status: ACTIVE | Noted: 2019-06-30

## 2019-06-30 LAB
ALBUMIN SERPL-MCNC: 4.1 G/DL (ref 3.5–5.2)
ALP BLD-CCNC: 125 U/L (ref 40–129)
ALT SERPL-CCNC: 20 U/L (ref 0–40)
ANION GAP SERPL CALCULATED.3IONS-SCNC: 14 MMOL/L (ref 7–16)
AST SERPL-CCNC: 15 U/L (ref 0–39)
BASOPHILS ABSOLUTE: 0.06 E9/L (ref 0–0.2)
BASOPHILS RELATIVE PERCENT: 0.6 % (ref 0–2)
BILIRUB SERPL-MCNC: 0.4 MG/DL (ref 0–1.2)
BUN BLDV-MCNC: 12 MG/DL (ref 6–20)
C-REACTIVE PROTEIN: 7.6 MG/DL (ref 0–0.4)
CALCIUM SERPL-MCNC: 9.3 MG/DL (ref 8.6–10.2)
CHLORIDE BLD-SCNC: 96 MMOL/L (ref 98–107)
CO2: 25 MMOL/L (ref 22–29)
CREAT SERPL-MCNC: 0.9 MG/DL (ref 0.7–1.2)
EOSINOPHILS ABSOLUTE: 0.45 E9/L (ref 0.05–0.5)
EOSINOPHILS RELATIVE PERCENT: 4.7 % (ref 0–6)
GFR AFRICAN AMERICAN: >60
GFR NON-AFRICAN AMERICAN: >60 ML/MIN/1.73
GLUCOSE BLD-MCNC: 213 MG/DL (ref 74–99)
HCT VFR BLD CALC: 40.7 % (ref 37–54)
HEMOGLOBIN: 14 G/DL (ref 12.5–16.5)
IMMATURE GRANULOCYTES #: 0.03 E9/L
IMMATURE GRANULOCYTES %: 0.3 % (ref 0–5)
LACTIC ACID: 1.9 MMOL/L (ref 0.5–2.2)
LYMPHOCYTES ABSOLUTE: 1.53 E9/L (ref 1.5–4)
LYMPHOCYTES RELATIVE PERCENT: 16.1 % (ref 20–42)
MCH RBC QN AUTO: 29.9 PG (ref 26–35)
MCHC RBC AUTO-ENTMCNC: 34.4 % (ref 32–34.5)
MCV RBC AUTO: 86.8 FL (ref 80–99.9)
METER GLUCOSE: 187 MG/DL (ref 74–99)
MONOCYTES ABSOLUTE: 0.72 E9/L (ref 0.1–0.95)
MONOCYTES RELATIVE PERCENT: 7.6 % (ref 2–12)
NEUTROPHILS ABSOLUTE: 6.69 E9/L (ref 1.8–7.3)
NEUTROPHILS RELATIVE PERCENT: 70.7 % (ref 43–80)
PDW BLD-RTO: 12.3 FL (ref 11.5–15)
PLATELET # BLD: 253 E9/L (ref 130–450)
PMV BLD AUTO: 10.5 FL (ref 7–12)
POTASSIUM REFLEX MAGNESIUM: 4.4 MMOL/L (ref 3.5–5)
RBC # BLD: 4.69 E12/L (ref 3.8–5.8)
SEDIMENTATION RATE, ERYTHROCYTE: 28 MM/HR (ref 0–15)
SODIUM BLD-SCNC: 135 MMOL/L (ref 132–146)
TOTAL PROTEIN: 7.3 G/DL (ref 6.4–8.3)
WBC # BLD: 9.5 E9/L (ref 4.5–11.5)

## 2019-06-30 PROCEDURE — 6370000000 HC RX 637 (ALT 250 FOR IP): Performed by: NURSE PRACTITIONER

## 2019-06-30 PROCEDURE — G0378 HOSPITAL OBSERVATION PER HR: HCPCS

## 2019-06-30 PROCEDURE — 96366 THER/PROPH/DIAG IV INF ADDON: CPT

## 2019-06-30 PROCEDURE — 6360000002 HC RX W HCPCS: Performed by: NURSE PRACTITIONER

## 2019-06-30 PROCEDURE — 83605 ASSAY OF LACTIC ACID: CPT

## 2019-06-30 PROCEDURE — 80053 COMPREHEN METABOLIC PANEL: CPT

## 2019-06-30 PROCEDURE — 2580000003 HC RX 258: Performed by: NURSE PRACTITIONER

## 2019-06-30 PROCEDURE — 87070 CULTURE OTHR SPECIMN AEROBIC: CPT

## 2019-06-30 PROCEDURE — 86140 C-REACTIVE PROTEIN: CPT

## 2019-06-30 PROCEDURE — 87077 CULTURE AEROBIC IDENTIFY: CPT

## 2019-06-30 PROCEDURE — 85651 RBC SED RATE NONAUTOMATED: CPT

## 2019-06-30 PROCEDURE — 99285 EMERGENCY DEPT VISIT HI MDM: CPT

## 2019-06-30 PROCEDURE — 96372 THER/PROPH/DIAG INJ SC/IM: CPT

## 2019-06-30 PROCEDURE — 93005 ELECTROCARDIOGRAM TRACING: CPT | Performed by: NURSE PRACTITIONER

## 2019-06-30 PROCEDURE — 87186 SC STD MICRODIL/AGAR DIL: CPT

## 2019-06-30 PROCEDURE — 96375 TX/PRO/DX INJ NEW DRUG ADDON: CPT

## 2019-06-30 PROCEDURE — 96365 THER/PROPH/DIAG IV INF INIT: CPT

## 2019-06-30 PROCEDURE — 85025 COMPLETE CBC W/AUTO DIFF WBC: CPT

## 2019-06-30 PROCEDURE — 6370000000 HC RX 637 (ALT 250 FOR IP): Performed by: INTERNAL MEDICINE

## 2019-06-30 PROCEDURE — 93971 EXTREMITY STUDY: CPT

## 2019-06-30 PROCEDURE — 82962 GLUCOSE BLOOD TEST: CPT

## 2019-06-30 PROCEDURE — 36415 COLL VENOUS BLD VENIPUNCTURE: CPT

## 2019-06-30 PROCEDURE — 73630 X-RAY EXAM OF FOOT: CPT

## 2019-06-30 PROCEDURE — 87040 BLOOD CULTURE FOR BACTERIA: CPT

## 2019-06-30 PROCEDURE — 96367 TX/PROPH/DG ADDL SEQ IV INF: CPT

## 2019-06-30 RX ORDER — ONDANSETRON 2 MG/ML
4 INJECTION INTRAMUSCULAR; INTRAVENOUS EVERY 6 HOURS PRN
Status: DISCONTINUED | OUTPATIENT
Start: 2019-06-30 | End: 2019-07-05 | Stop reason: HOSPADM

## 2019-06-30 RX ORDER — SODIUM CHLORIDE 0.9 % (FLUSH) 0.9 %
10 SYRINGE (ML) INJECTION EVERY 12 HOURS SCHEDULED
Status: DISCONTINUED | OUTPATIENT
Start: 2019-06-30 | End: 2019-07-05 | Stop reason: HOSPADM

## 2019-06-30 RX ORDER — LISINOPRIL 20 MG/1
20 TABLET ORAL DAILY
Status: DISCONTINUED | OUTPATIENT
Start: 2019-06-30 | End: 2019-07-05 | Stop reason: HOSPADM

## 2019-06-30 RX ORDER — 0.9 % SODIUM CHLORIDE 0.9 %
1000 INTRAVENOUS SOLUTION INTRAVENOUS ONCE
Status: COMPLETED | OUTPATIENT
Start: 2019-06-30 | End: 2019-06-30

## 2019-06-30 RX ORDER — PREGABALIN 100 MG/1
300 CAPSULE ORAL 2 TIMES DAILY
Status: DISCONTINUED | OUTPATIENT
Start: 2019-06-30 | End: 2019-07-05 | Stop reason: HOSPADM

## 2019-06-30 RX ORDER — DULOXETIN HYDROCHLORIDE 30 MG/1
30 CAPSULE, DELAYED RELEASE ORAL DAILY
Status: DISCONTINUED | OUTPATIENT
Start: 2019-06-30 | End: 2019-07-05 | Stop reason: HOSPADM

## 2019-06-30 RX ORDER — FENOFIBRATE 54 MG/1
54 TABLET ORAL DAILY
Status: DISCONTINUED | OUTPATIENT
Start: 2019-06-30 | End: 2019-07-05 | Stop reason: HOSPADM

## 2019-06-30 RX ORDER — CYCLOBENZAPRINE HCL 10 MG
10 TABLET ORAL 3 TIMES DAILY PRN
Status: DISCONTINUED | OUTPATIENT
Start: 2019-06-30 | End: 2019-07-05 | Stop reason: HOSPADM

## 2019-06-30 RX ORDER — ASPIRIN 81 MG/1
81 TABLET ORAL DAILY
Status: DISCONTINUED | OUTPATIENT
Start: 2019-06-30 | End: 2019-07-05 | Stop reason: HOSPADM

## 2019-06-30 RX ORDER — FLUTICASONE PROPIONATE 50 MCG
2 SPRAY, SUSPENSION (ML) NASAL DAILY
Status: DISCONTINUED | OUTPATIENT
Start: 2019-06-30 | End: 2019-07-05 | Stop reason: HOSPADM

## 2019-06-30 RX ORDER — OXYCODONE HYDROCHLORIDE AND ACETAMINOPHEN 5; 325 MG/1; MG/1
2 TABLET ORAL EVERY 6 HOURS PRN
Status: DISCONTINUED | OUTPATIENT
Start: 2019-06-30 | End: 2019-07-05 | Stop reason: HOSPADM

## 2019-06-30 RX ORDER — MORPHINE SULFATE 4 MG/ML
4 INJECTION, SOLUTION INTRAMUSCULAR; INTRAVENOUS ONCE
Status: COMPLETED | OUTPATIENT
Start: 2019-06-30 | End: 2019-06-30

## 2019-06-30 RX ORDER — PRAVASTATIN SODIUM 20 MG
20 TABLET ORAL NIGHTLY
Status: DISCONTINUED | OUTPATIENT
Start: 2019-06-30 | End: 2019-07-05 | Stop reason: HOSPADM

## 2019-06-30 RX ORDER — SODIUM CHLORIDE 0.9 % (FLUSH) 0.9 %
10 SYRINGE (ML) INJECTION PRN
Status: DISCONTINUED | OUTPATIENT
Start: 2019-06-30 | End: 2019-07-05 | Stop reason: HOSPADM

## 2019-06-30 RX ADMIN — VANCOMYCIN HYDROCHLORIDE 2000 MG: 10 INJECTION, POWDER, LYOPHILIZED, FOR SOLUTION INTRAVENOUS at 12:46

## 2019-06-30 RX ADMIN — FLUTICASONE PROPIONATE 2 SPRAY: 50 SPRAY, METERED NASAL at 14:56

## 2019-06-30 RX ADMIN — Medication 10 ML: at 20:25

## 2019-06-30 RX ADMIN — FENOFIBRATE 54 MG: 54 TABLET ORAL at 14:57

## 2019-06-30 RX ADMIN — ENOXAPARIN SODIUM 40 MG: 100 INJECTION SUBCUTANEOUS at 14:57

## 2019-06-30 RX ADMIN — CYCLOBENZAPRINE HYDROCHLORIDE 10 MG: 10 TABLET, FILM COATED ORAL at 20:25

## 2019-06-30 RX ADMIN — PRAVASTATIN SODIUM 20 MG: 20 TABLET ORAL at 20:25

## 2019-06-30 RX ADMIN — MORPHINE SULFATE 4 MG: 4 INJECTION, SOLUTION INTRAMUSCULAR; INTRAVENOUS at 12:07

## 2019-06-30 RX ADMIN — DULOXETINE HYDROCHLORIDE 30 MG: 30 CAPSULE, DELAYED RELEASE ORAL at 14:56

## 2019-06-30 RX ADMIN — PREGABALIN 300 MG: 100 CAPSULE ORAL at 20:25

## 2019-06-30 RX ADMIN — SODIUM CHLORIDE 1000 ML: 9 INJECTION, SOLUTION INTRAVENOUS at 11:58

## 2019-06-30 RX ADMIN — ASPIRIN 81 MG: 81 TABLET ORAL at 14:56

## 2019-06-30 RX ADMIN — Medication 2 G: at 12:07

## 2019-06-30 RX ADMIN — OXYCODONE HYDROCHLORIDE AND ACETAMINOPHEN 2 TABLET: 5; 325 TABLET ORAL at 14:57

## 2019-06-30 RX ADMIN — OXYCODONE HYDROCHLORIDE AND ACETAMINOPHEN 2 TABLET: 5; 325 TABLET ORAL at 21:05

## 2019-06-30 RX ADMIN — PREGABALIN 300 MG: 100 CAPSULE ORAL at 14:57

## 2019-06-30 RX ADMIN — CEFEPIME HYDROCHLORIDE 2 G: 2 INJECTION, POWDER, FOR SOLUTION INTRAVENOUS at 20:24

## 2019-06-30 RX ADMIN — LISINOPRIL 20 MG: 20 TABLET ORAL at 14:56

## 2019-06-30 SDOH — HEALTH STABILITY: MENTAL HEALTH: HOW OFTEN DO YOU HAVE A DRINK CONTAINING ALCOHOL?: MONTHLY OR LESS

## 2019-06-30 ASSESSMENT — PAIN DESCRIPTION - PROGRESSION: CLINICAL_PROGRESSION: GRADUALLY WORSENING

## 2019-06-30 ASSESSMENT — PAIN SCALES - GENERAL
PAINLEVEL_OUTOF10: 10
PAINLEVEL_OUTOF10: 7
PAINLEVEL_OUTOF10: 5
PAINLEVEL_OUTOF10: 8
PAINLEVEL_OUTOF10: 10

## 2019-06-30 ASSESSMENT — PAIN DESCRIPTION - LOCATION: LOCATION: FOOT;ANKLE

## 2019-06-30 ASSESSMENT — PAIN DESCRIPTION - FREQUENCY: FREQUENCY: CONTINUOUS

## 2019-06-30 ASSESSMENT — PAIN - FUNCTIONAL ASSESSMENT: PAIN_FUNCTIONAL_ASSESSMENT: PREVENTS OR INTERFERES WITH MANY ACTIVE NOT PASSIVE ACTIVITIES

## 2019-06-30 ASSESSMENT — PAIN DESCRIPTION - DESCRIPTORS: DESCRIPTORS: ACHING;DISCOMFORT;SORE;SHOOTING

## 2019-06-30 ASSESSMENT — PAIN DESCRIPTION - ONSET: ONSET: ON-GOING

## 2019-06-30 ASSESSMENT — PAIN DESCRIPTION - PAIN TYPE: TYPE: ACUTE PAIN;SURGICAL PAIN

## 2019-06-30 ASSESSMENT — PAIN DESCRIPTION - ORIENTATION: ORIENTATION: LEFT

## 2019-06-30 NOTE — CONSULTS
APRN - CNP        oxyCODONE-acetaminophen (PERCOCET) 5-325 MG per tablet 2 tablet  2 tablet Oral Q6H PRN Paola Reyna MD   2 tablet at 06/30/19 1457        Lab Results   Component Value Date    WBC 9.5 06/30/2019    HCT 40.7 06/30/2019    HGB 14.0 06/30/2019     06/30/2019     06/30/2019    K 4.4 06/30/2019    CL 96 (L) 06/30/2019    CO2 25 06/30/2019    BUN 12 06/30/2019    CREATININE 0.9 06/30/2019    GLUCOSE 213 (H) 06/30/2019    CRP 7.6 (H) 06/30/2019         Radiographs:    ASSESEMENT:  Patient Active Hospital Problem List:   Cellulitis of foot (6/30/2019)  Post op infection         PLAN:  -Evaluation and Management  -Charts and labs reviewed  -Culture taken in ED, pending  -Patient on vanc/cefepime at this time.  -Incision site cleansed with normal saline and dressed with betadine, 4x4s and kerlix.   -L foot X-ray negative for OM of gas  -Monitor local response to abx at this time, if worsens or doesn't get better will consider MRI to assess for abscess or other deep infection  -Patient discussed with NANI Azul   6/30/2019   3:02 PM

## 2019-06-30 NOTE — ED PROVIDER NOTES
ED Attending  CC: Tana     Department of Emergency Medicine   ED  Provider Note  Admit Date/RoomTime: 6/30/2019 10:46 AM  ED Room: 06/06   Chief Complaint   Foot Pain (nerve surgery  3 weeks ago, has an infection at surgery site pain shooting into calf and up into hip  Hx of hip replacement )    History of Present Illness   Source of history provided by:  patient. History/Exam Limitations: none. Zachery Garibay is a 37 y.o. old male who has a past medical history of:   Past Medical History:   Diagnosis Date    SIMÓN (acute kidney injury) ([address] 2008 apx    kidney bruised due to fall / [place]    Allergic rhinitis     Chronic back pain     Depression     Diabetes mellitus ([facility] [address]     Difficulty sleeping     at times    Displacement of lumbar intervertebral disc without myelopathy     Fibromyalgia     Fractured rib     2008 / healed    H/O seasonal allergies     Head injury 1980'S apx    no residual s/s    Hyperlipidemia     Hypertension     Obesity (BMI 35.0-39.9 without comorbidity)     bmi 39.2  weight 296 #    Osteoarthritis     Thoracic or lumbosacral neuritis or radiculitis, unspecified     presents to the emergency department by private vehicle, for evaluation of surgical incision located on left foot, which occured 1 day(s) prior to arrival.   The wound is / has erythema and purulent, yellow drainage. Patient stated that approximately 3 weeks ago he had surgery on his foot for nerve issues due to a crushing injury that occurred over 10 years ago. He stated that from the injury he had nerve damage and his podiatrist Elton Mason did his surgery on the nerves in his foot. They that the incision was very red and was draining yellow drainage. He stated that the pain in his foot has increased but he is still able to ambulate but with pain. He is denying any fevers or chills at this time. He does appear nontoxic.               Prior Treatment:     [x]   Sutured      []   Stapled      [] Packing      []     ATB's: None     ROS    Pertinent positives and negatives are stated within HPI, all other systems reviewed and are negative. Past Surgical History:   Procedure Laterality Date    FOOT SURGERY Right 1985    to treat shattered bones    HERNIA REPAIR  2001    DOUBLE HERNIA    HIP ARTHROPLASTY Left 4/11/2016    NECK SURGERY  2000    FUSION    [place] COLONOSCOPY FLX DX W/COLLJ SPEC WHEN PFRMD N/A 5/7/2018    COLONOSCOPY DIAGNOSTIC performed by Kevan Forbes MD at [address] [place] [address] Left 2014    Dr. Belinda Gavin ARTHROSCOPY Left 11 21 14    SHOULDER SURGERY  2001 &2007    RIGHT AND LEFT repair of tears    TOTAL HIP ARTHROPLASTY Right 10/31/2016    Total right hip  Claus Davis MD    Tyree Flock WRIST SURGERY  2007    LEFT WRIST   Social History:  reports that he has never smoked. His smokeless tobacco use includes chew. He reports that he does not drink alcohol or use drugs. Family History: family history includes Arthritis in his father; Cancer in his maternal grandfather and paternal uncle; Diabetes in his father and paternal grandfather; Heart Disease in his maternal grandmother; Hypertension in his mother; Stroke in his maternal aunt. Allergies: Seasonal and Tramadol    Physical Exam           ED Triage Vitals   BP Temp Temp Source Pulse Resp SpO2 Height Weight   06/30/19 1030 06/30/19 1027 06/30/19 1027 06/30/19 1027 06/30/19 1030 06/30/19 1027 -- 06/30/19 1030   (!) 142/106 96.7 °F (35.9 °C) Temporal 123 18 98 %  295 lb (133.8 kg)      Oxygen Saturation Interpretation: Normal.    Constitutional:  Alert, development consistent with age. HEENT:  NC/NT. Airway patent. Neck:  Normal ROM. Supple. Respiratory:  Clear to auscultation and breath sounds equal.  CV:  Regular rate and rhythm, normal heart sounds, without pathological murmurs, ectopy, gallops, or rubs. GI:  Abdomen Soft, nontender, good bowel sounds. No firm or pulsatile mass.   Back:  No

## 2019-07-01 ENCOUNTER — TELEPHONE (OUTPATIENT)
Dept: FAMILY MEDICINE CLINIC | Age: 44
End: 2019-07-01

## 2019-07-01 LAB
ANION GAP SERPL CALCULATED.3IONS-SCNC: 15 MMOL/L (ref 7–16)
BUN BLDV-MCNC: 10 MG/DL (ref 6–20)
CALCIUM SERPL-MCNC: 9 MG/DL (ref 8.6–10.2)
CHLORIDE BLD-SCNC: 98 MMOL/L (ref 98–107)
CO2: 24 MMOL/L (ref 22–29)
CREAT SERPL-MCNC: 0.9 MG/DL (ref 0.7–1.2)
EKG ATRIAL RATE: 100 BPM
EKG P AXIS: 38 DEGREES
EKG P-R INTERVAL: 164 MS
EKG Q-T INTERVAL: 336 MS
EKG QRS DURATION: 100 MS
EKG QTC CALCULATION (BAZETT): 433 MS
EKG R AXIS: 48 DEGREES
EKG T AXIS: 55 DEGREES
EKG VENTRICULAR RATE: 100 BPM
GFR AFRICAN AMERICAN: >60
GFR NON-AFRICAN AMERICAN: >60 ML/MIN/1.73
GLUCOSE BLD-MCNC: 184 MG/DL (ref 74–99)
HCT VFR BLD CALC: 37.3 % (ref 37–54)
HEMOGLOBIN: 12.9 G/DL (ref 12.5–16.5)
MCH RBC QN AUTO: 30.2 PG (ref 26–35)
MCHC RBC AUTO-ENTMCNC: 34.6 % (ref 32–34.5)
MCV RBC AUTO: 87.4 FL (ref 80–99.9)
PDW BLD-RTO: 12.2 FL (ref 11.5–15)
PLATELET # BLD: 198 E9/L (ref 130–450)
PMV BLD AUTO: 10.1 FL (ref 7–12)
POTASSIUM REFLEX MAGNESIUM: 4.2 MMOL/L (ref 3.5–5)
RBC # BLD: 4.27 E12/L (ref 3.8–5.8)
SODIUM BLD-SCNC: 137 MMOL/L (ref 132–146)
WBC # BLD: 5.5 E9/L (ref 4.5–11.5)

## 2019-07-01 PROCEDURE — 85027 COMPLETE CBC AUTOMATED: CPT

## 2019-07-01 PROCEDURE — 6370000000 HC RX 637 (ALT 250 FOR IP): Performed by: NURSE PRACTITIONER

## 2019-07-01 PROCEDURE — 36415 COLL VENOUS BLD VENIPUNCTURE: CPT

## 2019-07-01 PROCEDURE — G0378 HOSPITAL OBSERVATION PER HR: HCPCS

## 2019-07-01 PROCEDURE — 2580000003 HC RX 258: Performed by: NURSE PRACTITIONER

## 2019-07-01 PROCEDURE — 80048 BASIC METABOLIC PNL TOTAL CA: CPT

## 2019-07-01 PROCEDURE — 93010 ELECTROCARDIOGRAM REPORT: CPT | Performed by: INTERNAL MEDICINE

## 2019-07-01 PROCEDURE — 96366 THER/PROPH/DIAG IV INF ADDON: CPT

## 2019-07-01 PROCEDURE — 6370000000 HC RX 637 (ALT 250 FOR IP): Performed by: INTERNAL MEDICINE

## 2019-07-01 PROCEDURE — 6360000002 HC RX W HCPCS: Performed by: NURSE PRACTITIONER

## 2019-07-01 RX ADMIN — OXYCODONE HYDROCHLORIDE AND ACETAMINOPHEN 2 TABLET: 5; 325 TABLET ORAL at 03:03

## 2019-07-01 RX ADMIN — FENOFIBRATE 54 MG: 54 TABLET ORAL at 09:16

## 2019-07-01 RX ADMIN — DULOXETINE HYDROCHLORIDE 30 MG: 30 CAPSULE, DELAYED RELEASE ORAL at 09:17

## 2019-07-01 RX ADMIN — LISINOPRIL 20 MG: 20 TABLET ORAL at 09:17

## 2019-07-01 RX ADMIN — OXYCODONE HYDROCHLORIDE AND ACETAMINOPHEN 2 TABLET: 5; 325 TABLET ORAL at 23:21

## 2019-07-01 RX ADMIN — OXYCODONE HYDROCHLORIDE AND ACETAMINOPHEN 2 TABLET: 5; 325 TABLET ORAL at 17:10

## 2019-07-01 RX ADMIN — VANCOMYCIN HYDROCHLORIDE 2000 MG: 10 INJECTION, POWDER, LYOPHILIZED, FOR SOLUTION INTRAVENOUS at 00:23

## 2019-07-01 RX ADMIN — Medication 10 ML: at 09:17

## 2019-07-01 RX ADMIN — OXYCODONE HYDROCHLORIDE AND ACETAMINOPHEN 2 TABLET: 5; 325 TABLET ORAL at 09:16

## 2019-07-01 RX ADMIN — PRAVASTATIN SODIUM 20 MG: 20 TABLET ORAL at 21:01

## 2019-07-01 RX ADMIN — PREGABALIN 300 MG: 100 CAPSULE ORAL at 09:16

## 2019-07-01 RX ADMIN — FLUTICASONE PROPIONATE 2 SPRAY: 50 SPRAY, METERED NASAL at 09:17

## 2019-07-01 RX ADMIN — VANCOMYCIN HYDROCHLORIDE 2000 MG: 10 INJECTION, POWDER, LYOPHILIZED, FOR SOLUTION INTRAVENOUS at 12:11

## 2019-07-01 RX ADMIN — CYCLOBENZAPRINE HYDROCHLORIDE 10 MG: 10 TABLET, FILM COATED ORAL at 09:17

## 2019-07-01 RX ADMIN — CEFEPIME HYDROCHLORIDE 2 G: 2 INJECTION, POWDER, FOR SOLUTION INTRAVENOUS at 09:16

## 2019-07-01 RX ADMIN — CEFEPIME HYDROCHLORIDE 2 G: 2 INJECTION, POWDER, FOR SOLUTION INTRAVENOUS at 21:03

## 2019-07-01 RX ADMIN — PREGABALIN 300 MG: 100 CAPSULE ORAL at 21:01

## 2019-07-01 RX ADMIN — ASPIRIN 81 MG: 81 TABLET ORAL at 09:17

## 2019-07-01 RX ADMIN — Medication 10 ML: at 21:02

## 2019-07-01 ASSESSMENT — PAIN DESCRIPTION - ORIENTATION
ORIENTATION: LEFT

## 2019-07-01 ASSESSMENT — PAIN DESCRIPTION - FREQUENCY
FREQUENCY: CONTINUOUS
FREQUENCY: CONTINUOUS
FREQUENCY: INTERMITTENT

## 2019-07-01 ASSESSMENT — PAIN DESCRIPTION - PAIN TYPE
TYPE: ACUTE PAIN

## 2019-07-01 ASSESSMENT — PAIN DESCRIPTION - DESCRIPTORS
DESCRIPTORS: ACHING;DISCOMFORT
DESCRIPTORS: ACHING;DISCOMFORT;SORE
DESCRIPTORS: ACHING;DISCOMFORT

## 2019-07-01 ASSESSMENT — PAIN DESCRIPTION - LOCATION
LOCATION: FOOT
LOCATION: FOOT;ANKLE
LOCATION: FOOT

## 2019-07-01 ASSESSMENT — PAIN SCALES - GENERAL
PAINLEVEL_OUTOF10: 4
PAINLEVEL_OUTOF10: 10
PAINLEVEL_OUTOF10: 5
PAINLEVEL_OUTOF10: 10
PAINLEVEL_OUTOF10: 7
PAINLEVEL_OUTOF10: 8
PAINLEVEL_OUTOF10: 10

## 2019-07-01 ASSESSMENT — PAIN DESCRIPTION - PROGRESSION
CLINICAL_PROGRESSION: NOT CHANGED

## 2019-07-01 ASSESSMENT — PAIN DESCRIPTION - ONSET
ONSET: ON-GOING

## 2019-07-01 ASSESSMENT — PAIN - FUNCTIONAL ASSESSMENT: PAIN_FUNCTIONAL_ASSESSMENT: PREVENTS OR INTERFERES SOME ACTIVE ACTIVITIES AND ADLS

## 2019-07-01 NOTE — PROGRESS NOTES
trough level that was ordered for tonight (as timing will be wrong now). Will reschedule the trough level once dosing is back on schedule and at steady state.       Yuni Metzger PharmD, BCPS 7/1/2019 5:24 PM

## 2019-07-02 PROBLEM — L03.90 CELLULITIS: Status: ACTIVE | Noted: 2019-07-02

## 2019-07-02 LAB — VANCOMYCIN TROUGH: 14.9 MCG/ML (ref 5–16)

## 2019-07-02 PROCEDURE — 6360000002 HC RX W HCPCS

## 2019-07-02 PROCEDURE — 80202 ASSAY OF VANCOMYCIN: CPT

## 2019-07-02 PROCEDURE — 2580000003 HC RX 258

## 2019-07-02 PROCEDURE — 6370000000 HC RX 637 (ALT 250 FOR IP): Performed by: NURSE PRACTITIONER

## 2019-07-02 PROCEDURE — 6370000000 HC RX 637 (ALT 250 FOR IP): Performed by: INTERNAL MEDICINE

## 2019-07-02 PROCEDURE — 6360000002 HC RX W HCPCS: Performed by: NURSE PRACTITIONER

## 2019-07-02 PROCEDURE — 36415 COLL VENOUS BLD VENIPUNCTURE: CPT

## 2019-07-02 PROCEDURE — 2580000003 HC RX 258: Performed by: NURSE PRACTITIONER

## 2019-07-02 PROCEDURE — 1200000000 HC SEMI PRIVATE

## 2019-07-02 RX ADMIN — CEFEPIME HYDROCHLORIDE 2 G: 2 INJECTION, POWDER, FOR SOLUTION INTRAVENOUS at 20:45

## 2019-07-02 RX ADMIN — LISINOPRIL 20 MG: 20 TABLET ORAL at 10:25

## 2019-07-02 RX ADMIN — PREGABALIN 300 MG: 100 CAPSULE ORAL at 20:45

## 2019-07-02 RX ADMIN — PREGABALIN 300 MG: 100 CAPSULE ORAL at 10:26

## 2019-07-02 RX ADMIN — OXYCODONE HYDROCHLORIDE AND ACETAMINOPHEN 2 TABLET: 5; 325 TABLET ORAL at 18:17

## 2019-07-02 RX ADMIN — ASPIRIN 81 MG: 81 TABLET ORAL at 10:25

## 2019-07-02 RX ADMIN — VANCOMYCIN HYDROCHLORIDE 2000 MG: 10 INJECTION, POWDER, LYOPHILIZED, FOR SOLUTION INTRAVENOUS at 01:38

## 2019-07-02 RX ADMIN — OXYCODONE HYDROCHLORIDE AND ACETAMINOPHEN 2 TABLET: 5; 325 TABLET ORAL at 11:58

## 2019-07-02 RX ADMIN — FENOFIBRATE 54 MG: 54 TABLET ORAL at 10:25

## 2019-07-02 RX ADMIN — ENOXAPARIN SODIUM 40 MG: 100 INJECTION SUBCUTANEOUS at 10:37

## 2019-07-02 RX ADMIN — VANCOMYCIN HYDROCHLORIDE 2000 MG: 10 INJECTION, POWDER, LYOPHILIZED, FOR SOLUTION INTRAVENOUS at 16:14

## 2019-07-02 RX ADMIN — Medication 10 ML: at 10:38

## 2019-07-02 RX ADMIN — FLUTICASONE PROPIONATE 2 SPRAY: 50 SPRAY, METERED NASAL at 10:40

## 2019-07-02 RX ADMIN — PRAVASTATIN SODIUM 20 MG: 20 TABLET ORAL at 20:45

## 2019-07-02 RX ADMIN — DULOXETINE HYDROCHLORIDE 30 MG: 30 CAPSULE, DELAYED RELEASE ORAL at 10:25

## 2019-07-02 RX ADMIN — Medication 10 ML: at 20:45

## 2019-07-02 RX ADMIN — OXYCODONE HYDROCHLORIDE AND ACETAMINOPHEN 2 TABLET: 5; 325 TABLET ORAL at 05:21

## 2019-07-02 RX ADMIN — CEFEPIME HYDROCHLORIDE 2 G: 2 INJECTION, POWDER, FOR SOLUTION INTRAVENOUS at 10:37

## 2019-07-02 RX ADMIN — VANCOMYCIN HYDROCHLORIDE 500 MG: 500 INJECTION, POWDER, LYOPHILIZED, FOR SOLUTION INTRAVENOUS at 20:44

## 2019-07-02 ASSESSMENT — PAIN SCALES - GENERAL
PAINLEVEL_OUTOF10: 6
PAINLEVEL_OUTOF10: 6
PAINLEVEL_OUTOF10: 10

## 2019-07-02 ASSESSMENT — ENCOUNTER SYMPTOMS
VOMITING: 0
NAUSEA: 0
SHORTNESS OF BREATH: 0

## 2019-07-02 ASSESSMENT — PAIN DESCRIPTION - ORIENTATION: ORIENTATION: LEFT

## 2019-07-02 ASSESSMENT — PAIN DESCRIPTION - LOCATION: LOCATION: FOOT

## 2019-07-02 ASSESSMENT — PAIN DESCRIPTION - PROGRESSION: CLINICAL_PROGRESSION: NOT CHANGED

## 2019-07-02 ASSESSMENT — PAIN DESCRIPTION - DESCRIPTORS: DESCRIPTORS: ACHING;DISCOMFORT

## 2019-07-02 ASSESSMENT — PAIN DESCRIPTION - ONSET: ONSET: ON-GOING

## 2019-07-02 ASSESSMENT — PAIN DESCRIPTION - PAIN TYPE: TYPE: ACUTE PAIN

## 2019-07-02 ASSESSMENT — PAIN DESCRIPTION - FREQUENCY: FREQUENCY: CONTINUOUS

## 2019-07-02 NOTE — PROGRESS NOTES
Adrien Blankenship is a 37 y.o. male patient. Patient was seen at bedside this morning and was in NAD. Patient had leg elevated with ice applied to periwound. Patient denies any N/V/F/C SOB/CP, but does endorse pain with palpation to the LLE.     Current Facility-Administered Medications   Medication Dose Route Frequency Provider Last Rate Last Dose    aspirin EC tablet 81 mg  81 mg Oral Daily IGOR Call CNP   81 mg at 07/02/19 1025    cyclobenzaprine (FLEXERIL) tablet 10 mg  10 mg Oral TID PRN IGOR Call CNP   10 mg at 07/01/19 0917    DULoxetine (CYMBALTA) extended release capsule 30 mg  30 mg Oral Daily IGOR Call CNP   30 mg at 07/02/19 1025    fenofibrate (TRICOR) tablet 54 mg  54 mg Oral Daily IGOR Call CNP   54 mg at 07/02/19 1025    fluticasone (FLONASE) 50 MCG/ACT nasal spray 2 spray  2 spray Nasal Daily IGOR Call CNP   2 spray at 07/02/19 1040    lisinopril (PRINIVIL;ZESTRIL) tablet 20 mg  20 mg Oral Daily IGOR Call CNP   20 mg at 07/02/19 1025    pravastatin (PRAVACHOL) tablet 20 mg  20 mg Oral Nightly IGOR Call CNP   20 mg at 07/01/19 2101    pregabalin (LYRICA) capsule 300 mg  300 mg Oral BID IGOR Call CNP   300 mg at 07/02/19 1026    sodium chloride flush 0.9 % injection 10 mL  10 mL Intravenous 2 times per day IGOR Call CNP   10 mL at 07/02/19 1038    sodium chloride flush 0.9 % injection 10 mL  10 mL Intravenous PRN IGOR Call CNP        magnesium hydroxide (MILK OF MAGNESIA) 400 MG/5ML suspension 30 mL  30 mL Oral Daily PRN IGOR Call CNP        ondansetron [facility]) injection 4 mg  4 mg Intravenous Q6H PRN IGOR Call CNP        enoxaparin (LOVENOX) injection 40 mg  40 mg Subcutaneous Daily IGOR Call CNP   40 mg at 07/02/19 1037    cefepime (MAXIPIME) 2 g IVPB extended (mini-bag)  2 g Intravenous Q12H IGOR Call CNP 12.5

## 2019-07-02 NOTE — PROGRESS NOTES
Nutrition Assessment    Type and Reason for Visit: Initial, Positive Nutrition Screen    Nutrition Recommendations: Continue current diet, Start ONS, HP ensure BID. Nutrition Assessment: Pt well nourished and well developed on admission now at nutrition compromise 2/2 increased nutrient needs for wound healing, ankle incision, Providing ONS BID. Malnutrition Assessment:  · Malnutrition Status: At risk for malnutrition  · Context: Acute illness or injury  · Findings of the 6 clinical characteristics of malnutrition (Minimum of 2 out of 6 clinical characteristics is required to make the diagnosis of moderate or severe Protein Calorie Malnutrition based on AND/ASPEN Guidelines):  1. Energy Intake-Greater than 75% of estimated energy requirement, (1 day)    2. Weight Loss-No significant weight loss, in 3 months  3. Fat Loss-No significant subcutaneous fat loss,    4. Muscle Loss-No significant muscle mass loss,    5. Fluid Accumulation-Mild fluid accumulation, Extremities  6.  Strength-Not measured    Nutrition Risk Level:  Moderate    Nutrient Needs:  · Estimated Daily Total Kcal: 8895-5271 (CBW 12-15 kcals/kg)  · Estimated Daily Protein (g): 120-135(IBW 1.5-1.8 g/kg)  · Estimated Daily Total Fluid (ml/day): 7820-7582(1 ml/kcal)    Nutrition Diagnosis:   · Problem: Increased nutrient needs  · Etiology: related to Increased demand for energy/nutrients     Signs and symptoms:  as evidenced by Presence of wounds    Objective Information:  · Nutrition-Focused Physical Findings: alert, abdomen WDL, + BS, + I/O, + 2 non pitting edema,   · Wound Type: Open Wounds  · Current Nutrition Therapies:  · Oral Diet Orders: Carb Control 4 Carbs/Meal   · Oral Diet intake: %  · Anthropometric Measures:  · Ht: 6' (182.9 cm)   · Current Body Wt: 300 lb (136.1 kg)(7/2 bed scale)  · Admission Body Wt: 300 lb (136.1 kg)(7/2 first actual)  · Usual Body Wt: 295 lb (133.8 kg)  · % Weight Change:  ,  pt denies significant

## 2019-07-02 NOTE — DISCHARGE SUMMARY
07/01/2019    PHOS 3.6 04/13/2016       Imaging:  US DUP LOWER EXTREMITY LEFT JORGE   Final Result      No evidence for deep vein thrombosis of the left lower extremity. XR FOOT LEFT (MIN 3 VIEWS)   Final Result      NO ACUTE FRACTURE OR DISLOCATION LEFT FOOT.             Discharge Medications:     Current Discharge Medication List           Details   pregabalin (LYRICA) 300 MG capsule TAKE ONE CAPSULE BY MOUTH TWICE DAILY  Qty: 60 capsule, Refills: 5    Associated Diagnoses: Neuropathic pain; Lumbar radiculopathy      pravastatin (PRAVACHOL) 20 MG tablet take 1 tablet by mouth once daily  Qty: 30 tablet, Refills: 5    Associated Diagnoses: Mixed hyperlipidemia      metFORMIN (GLUCOPHAGE) 500 MG tablet take 2 tablets twice a day with meals  Qty: 120 tablet, Refills: 5    Associated Diagnoses: Type 2 diabetes mellitus without complication, without long-term current use of insulin (HCC)      lisinopril (PRINIVIL;ZESTRIL) 20 MG tablet take 1 tablet by mouth once daily  Qty: 30 tablet, Refills: 5    Associated Diagnoses: Essential hypertension      fluticasone (FLONASE) 50 MCG/ACT nasal spray 2 sprays by Nasal route daily  Qty: 1 Bottle, Refills: 5      fenofibrate (TRICOR) 54 MG tablet Take 1 tablet by mouth daily s [facility] pharmacy: Please dispense generic fenofibrate unless prescriber denote  Qty: 30 tablet, Refills: 5    Associated Diagnoses: Mixed hyperlipidemia      DULoxetine (CYMBALTA) 30 MG extended release capsule Take 1 capsule by mouth daily  Qty: 30 capsule, Refills: 5    Associated Diagnoses: Neuropathic pain; Lumbar radiculopathy      cyclobenzaprine (FLEXERIL) 10 MG tablet Take 1 tablet by mouth 3 times daily as needed for Muscle spasms  Qty: 90 tablet, Refills: 1    Associated Diagnoses: Neuropathic pain; Lumbar radiculopathy      aspirin (SM ASPIRIN ADULT LOW STRENGTH) 81 MG EC tablet take 1 tablet by mouth once daily  Qty: 30 tablet, Refills: 5    Associated Diagnoses: Type 2 diabetes mellitus without

## 2019-07-02 NOTE — PROGRESS NOTES
Message sent to Dr. Julia Chen with Dr. Perla Holland recommendation of inpatient status for this patient. Awaiting orders or callback at this time.

## 2019-07-03 PROCEDURE — 6370000000 HC RX 637 (ALT 250 FOR IP): Performed by: NURSE PRACTITIONER

## 2019-07-03 PROCEDURE — 6360000002 HC RX W HCPCS: Performed by: NURSE PRACTITIONER

## 2019-07-03 PROCEDURE — 6360000002 HC RX W HCPCS

## 2019-07-03 PROCEDURE — 1200000000 HC SEMI PRIVATE

## 2019-07-03 PROCEDURE — 6370000000 HC RX 637 (ALT 250 FOR IP): Performed by: INTERNAL MEDICINE

## 2019-07-03 PROCEDURE — 2580000003 HC RX 258: Performed by: NURSE PRACTITIONER

## 2019-07-03 PROCEDURE — 2580000003 HC RX 258

## 2019-07-03 RX ORDER — HYDROMORPHONE HYDROCHLORIDE 2 MG/1
1 TABLET ORAL ONCE
Status: DISCONTINUED | OUTPATIENT
Start: 2019-07-03 | End: 2019-07-03

## 2019-07-03 RX ADMIN — ENOXAPARIN SODIUM 40 MG: 100 INJECTION SUBCUTANEOUS at 09:30

## 2019-07-03 RX ADMIN — HYDROMORPHONE HYDROCHLORIDE 1 MG: 1 INJECTION, SOLUTION INTRAMUSCULAR; INTRAVENOUS; SUBCUTANEOUS at 11:41

## 2019-07-03 RX ADMIN — Medication 10 ML: at 21:04

## 2019-07-03 RX ADMIN — OXYCODONE HYDROCHLORIDE AND ACETAMINOPHEN 2 TABLET: 5; 325 TABLET ORAL at 00:22

## 2019-07-03 RX ADMIN — PREGABALIN 300 MG: 100 CAPSULE ORAL at 09:29

## 2019-07-03 RX ADMIN — CYCLOBENZAPRINE HYDROCHLORIDE 10 MG: 10 TABLET, FILM COATED ORAL at 09:29

## 2019-07-03 RX ADMIN — ASPIRIN 81 MG: 81 TABLET ORAL at 09:29

## 2019-07-03 RX ADMIN — Medication 10 ML: at 11:42

## 2019-07-03 RX ADMIN — PREGABALIN 300 MG: 100 CAPSULE ORAL at 21:00

## 2019-07-03 RX ADMIN — FENOFIBRATE 54 MG: 54 TABLET ORAL at 09:29

## 2019-07-03 RX ADMIN — OXYCODONE HYDROCHLORIDE AND ACETAMINOPHEN 2 TABLET: 5; 325 TABLET ORAL at 06:34

## 2019-07-03 RX ADMIN — DULOXETINE HYDROCHLORIDE 30 MG: 30 CAPSULE, DELAYED RELEASE ORAL at 09:29

## 2019-07-03 RX ADMIN — VANCOMYCIN HYDROCHLORIDE 2500 MG: 10 INJECTION, POWDER, LYOPHILIZED, FOR SOLUTION INTRAVENOUS at 06:34

## 2019-07-03 RX ADMIN — OXYCODONE HYDROCHLORIDE AND ACETAMINOPHEN 2 TABLET: 5; 325 TABLET ORAL at 21:00

## 2019-07-03 RX ADMIN — LISINOPRIL 20 MG: 20 TABLET ORAL at 09:29

## 2019-07-03 RX ADMIN — CEFEPIME HYDROCHLORIDE 2 G: 2 INJECTION, POWDER, FOR SOLUTION INTRAVENOUS at 21:00

## 2019-07-03 RX ADMIN — OXYCODONE HYDROCHLORIDE AND ACETAMINOPHEN 2 TABLET: 5; 325 TABLET ORAL at 13:25

## 2019-07-03 RX ADMIN — CEFEPIME HYDROCHLORIDE 2 G: 2 INJECTION, POWDER, FOR SOLUTION INTRAVENOUS at 11:42

## 2019-07-03 RX ADMIN — FLUTICASONE PROPIONATE 2 SPRAY: 50 SPRAY, METERED NASAL at 09:33

## 2019-07-03 RX ADMIN — Medication 10 ML: at 09:30

## 2019-07-03 ASSESSMENT — PAIN SCALES - GENERAL
PAINLEVEL_OUTOF10: 8
PAINLEVEL_OUTOF10: 10
PAINLEVEL_OUTOF10: 0
PAINLEVEL_OUTOF10: 0
PAINLEVEL_OUTOF10: 8

## 2019-07-03 ASSESSMENT — ENCOUNTER SYMPTOMS
NAUSEA: 0
SHORTNESS OF BREATH: 0
VOMITING: 0

## 2019-07-03 ASSESSMENT — PAIN DESCRIPTION - LOCATION
LOCATION: FOOT;LEG
LOCATION: FOOT;LEG

## 2019-07-03 ASSESSMENT — PAIN DESCRIPTION - ORIENTATION
ORIENTATION: LEFT
ORIENTATION: LEFT

## 2019-07-03 ASSESSMENT — PAIN DESCRIPTION - ONSET: ONSET: ON-GOING

## 2019-07-03 ASSESSMENT — PAIN DESCRIPTION - PAIN TYPE
TYPE: ACUTE PAIN;SURGICAL PAIN
TYPE: ACUTE PAIN;SURGICAL PAIN

## 2019-07-03 ASSESSMENT — PAIN DESCRIPTION - PROGRESSION: CLINICAL_PROGRESSION: NOT CHANGED

## 2019-07-03 ASSESSMENT — PAIN DESCRIPTION - DESCRIPTORS
DESCRIPTORS: DISCOMFORT;SHOOTING;SORE
DESCRIPTORS: DISCOMFORT;SORE;SHOOTING

## 2019-07-03 ASSESSMENT — PAIN DESCRIPTION - FREQUENCY: FREQUENCY: CONTINUOUS

## 2019-07-03 ASSESSMENT — PAIN - FUNCTIONAL ASSESSMENT: PAIN_FUNCTIONAL_ASSESSMENT: PREVENTS OR INTERFERES SOME ACTIVE ACTIVITIES AND ADLS

## 2019-07-03 NOTE — PROGRESS NOTES
Hospitalist Progress Note      PCP: Stefan Milton, APRN - CNP    Date of Admission: 6/30/2019  Days in the hospital: 1    Chief Complaint:  Left foot cellulitis     Hospital Course:     Admitted for left foot cellulitis, had left foot procedure two and half weeks ago. Podiatry and ID were consulted. Subjective  NAD, feels the same, has no complaints, no overnight events. Medications:  Reviewed    Infusion Medications   Scheduled Medications    vancomycin  2,500 mg Intravenous Q12H    aspirin  81 mg Oral Daily    DULoxetine  30 mg Oral Daily    fenofibrate  54 mg Oral Daily    fluticasone  2 spray Nasal Daily    lisinopril  20 mg Oral Daily    pravastatin  20 mg Oral Nightly    pregabalin  300 mg Oral BID    sodium chloride flush  10 mL Intravenous 2 times per day    enoxaparin  40 mg Subcutaneous Daily    cefepime  2 g Intravenous Q12H     PRN Meds: cyclobenzaprine, sodium chloride flush, magnesium hydroxide, ondansetron, oxyCODONE-acetaminophen      Intake/Output Summary (Last 24 hours) at 7/3/2019 0848  Last data filed at 7/3/2019 0832  Gross per 24 hour   Intake 1200 ml   Output --   Net 1200 ml       Exam:    /68   Pulse 90   Temp 97.7 °F (36.5 °C) (Temporal)   Resp 18   Ht 6' (1.829 m)   Wt (!) 307 lb 8 oz (139.5 kg)   SpO2 97%   BMI 41.70 kg/m²     General appearance:  No apparent distress, appears stated age and cooperative. HEENT:  Normal cephalic, atraumatic without obvious deformity. Pupils equal, round, and reactive to light. Extra ocular muscles intact. Conjunctivae/corneas clear. Neck: short thick, with full range of motion. No jugular venous distention. Trachea midline. Respiratory:  Normal respiratory effort. Clear to auscultation, bilaterally without Rales/Wheezes/Rhonchi. Cardiovascular:  Regular rate and rhythm with normal S1/S2 without murmurs, rubs or gallops. Abdomen: Soft, non-tender, non-distended with normal bowel sounds.   Musculoskeletal:  No MD  [employer]  Please contact me through perfect serve    NOTE: This report was transcribed using voice recognition software. Every effort was made to ensure accuracy; however, inadvertent computerized transcription errors may be present.

## 2019-07-03 NOTE — PROGRESS NOTES
SARAH NelsonN - CNP   40 mg at 07/03/19 0930    cefepime (MAXIPIME) 2 g IVPB extended (mini-bag)  2 g Intravenous Q12H Lenoard IGOR Serna - CNP 12.5 mL/hr at 07/03/19 1142 2 g at 07/03/19 1142    oxyCODONE-acetaminophen (PERCOCET) 5-325 MG per tablet 2 tablet  2 tablet Oral Q6H PRN Paola Reyna MD   2 tablet at 07/03/19 7157     Allergies   Allergen Reactions    Seasonal      HAYFEVER / sneezing,watery eyes    Tramadol Itching     Active Problems:    Essential hypertension    Morbid obesity due to excess calories ([facility])    Type 2 diabetes mellitus without complication ([facility])    Cellulitis of foot    Neuropathy    Cellulitis, wound, post-operative    Left leg swelling    SIRS (systemic inflammatory response syndrome) ([facility])    Cellulitis  Resolved Problems:    * No resolved hospital problems. *    Blood pressure 119/81, pulse 110, temperature 99.3 °F (37.4 °C), temperature source Temporal, resp. rate 18, height 6' (1.829 m), weight (!) 307 lb 8 oz (139.5 kg), SpO2 97 %. Wound cx 6/30/19:  Component Collected Lab   WOUND/ABSCESS Abnormal  06/30/2019 12:19 PM [facility] [address] [facility]   Mixed jf isolated. Further workup and sensitivity testing   is not routinely indicated and will not be performed. Mixed jf isolated includes:   Gram negative rods   Alpha hemolytic Strep species    Organism Abnormal  06/30/2019 12:19 PM [address] Lab   Staphylococcus aureus    WOUND/ABSCESS 06/30/2019 12:19 PM [address] Lab   Heavy growth   Methicillin resistant Staph aureus isolated. Most Methicillin   resistant Staphylococcus are usually resistant to multiple   antibiotics including other B-Lactams, Aminoglycosides,   Macrolides, Clindamycin and Tetracycline. Contact isolation   is indicated. XR left foot 6/30/19 3 views:  Findings: There is no evidence of bone or joint abnormality seen in the left   foot. There is no evidence of fracture or dislocation.  The soft   tissues appear normal. foot.   -Appreciate ID recs:  Continue vanc/cefepime.  -Dressings changed consisting of 4x4 gauze and DSD. -Will follow while in house.       Boom Yarbrough  7/3/2019

## 2019-07-04 LAB
CREAT SERPL-MCNC: 1.1 MG/DL (ref 0.7–1.2)
GFR AFRICAN AMERICAN: >60
GFR NON-AFRICAN AMERICAN: >60 ML/MIN/1.73
VANCOMYCIN TROUGH: 13.3 MCG/ML (ref 5–16)
VANCOMYCIN TROUGH: 15.1 MCG/ML (ref 5–16)

## 2019-07-04 PROCEDURE — 82565 ASSAY OF CREATININE: CPT

## 2019-07-04 PROCEDURE — 6370000000 HC RX 637 (ALT 250 FOR IP): Performed by: INTERNAL MEDICINE

## 2019-07-04 PROCEDURE — 2580000003 HC RX 258: Performed by: NURSE PRACTITIONER

## 2019-07-04 PROCEDURE — 6370000000 HC RX 637 (ALT 250 FOR IP): Performed by: NURSE PRACTITIONER

## 2019-07-04 PROCEDURE — 80202 ASSAY OF VANCOMYCIN: CPT

## 2019-07-04 PROCEDURE — 2580000003 HC RX 258

## 2019-07-04 PROCEDURE — 6360000002 HC RX W HCPCS: Performed by: NURSE PRACTITIONER

## 2019-07-04 PROCEDURE — 36415 COLL VENOUS BLD VENIPUNCTURE: CPT

## 2019-07-04 PROCEDURE — 1200000000 HC SEMI PRIVATE

## 2019-07-04 PROCEDURE — 6360000002 HC RX W HCPCS

## 2019-07-04 RX ADMIN — FLUTICASONE PROPIONATE 2 SPRAY: 50 SPRAY, METERED NASAL at 08:52

## 2019-07-04 RX ADMIN — VANCOMYCIN HYDROCHLORIDE 2500 MG: 10 INJECTION, POWDER, LYOPHILIZED, FOR SOLUTION INTRAVENOUS at 08:03

## 2019-07-04 RX ADMIN — PREGABALIN 300 MG: 100 CAPSULE ORAL at 08:50

## 2019-07-04 RX ADMIN — PRAVASTATIN SODIUM 20 MG: 20 TABLET ORAL at 21:54

## 2019-07-04 RX ADMIN — LISINOPRIL 20 MG: 20 TABLET ORAL at 08:51

## 2019-07-04 RX ADMIN — OXYCODONE HYDROCHLORIDE AND ACETAMINOPHEN 2 TABLET: 5; 325 TABLET ORAL at 09:36

## 2019-07-04 RX ADMIN — FENOFIBRATE 54 MG: 54 TABLET ORAL at 08:51

## 2019-07-04 RX ADMIN — OXYCODONE HYDROCHLORIDE AND ACETAMINOPHEN 2 TABLET: 5; 325 TABLET ORAL at 22:03

## 2019-07-04 RX ADMIN — Medication 10 ML: at 08:51

## 2019-07-04 RX ADMIN — ASPIRIN 81 MG: 81 TABLET ORAL at 08:51

## 2019-07-04 RX ADMIN — CEFEPIME HYDROCHLORIDE 2 G: 2 INJECTION, POWDER, FOR SOLUTION INTRAVENOUS at 21:55

## 2019-07-04 RX ADMIN — Medication 10 ML: at 21:55

## 2019-07-04 RX ADMIN — OXYCODONE HYDROCHLORIDE AND ACETAMINOPHEN 2 TABLET: 5; 325 TABLET ORAL at 03:11

## 2019-07-04 RX ADMIN — DULOXETINE HYDROCHLORIDE 30 MG: 30 CAPSULE, DELAYED RELEASE ORAL at 08:51

## 2019-07-04 RX ADMIN — PREGABALIN 300 MG: 100 CAPSULE ORAL at 21:54

## 2019-07-04 RX ADMIN — ENOXAPARIN SODIUM 40 MG: 100 INJECTION SUBCUTANEOUS at 08:51

## 2019-07-04 RX ADMIN — CEFEPIME HYDROCHLORIDE 2 G: 2 INJECTION, POWDER, FOR SOLUTION INTRAVENOUS at 08:51

## 2019-07-04 RX ADMIN — OXYCODONE HYDROCHLORIDE AND ACETAMINOPHEN 2 TABLET: 5; 325 TABLET ORAL at 15:59

## 2019-07-04 ASSESSMENT — PAIN DESCRIPTION - PAIN TYPE
TYPE: ACUTE PAIN
TYPE: SURGICAL PAIN

## 2019-07-04 ASSESSMENT — PAIN DESCRIPTION - ORIENTATION: ORIENTATION: LEFT

## 2019-07-04 ASSESSMENT — PAIN DESCRIPTION - DESCRIPTORS
DESCRIPTORS: ACHING;DISCOMFORT;NAGGING
DESCRIPTORS: ACHING;CONSTANT;DISCOMFORT

## 2019-07-04 ASSESSMENT — PAIN DESCRIPTION - LOCATION
LOCATION: FOOT
LOCATION: FOOT

## 2019-07-04 ASSESSMENT — PAIN SCALES - GENERAL
PAINLEVEL_OUTOF10: 8
PAINLEVEL_OUTOF10: 10
PAINLEVEL_OUTOF10: 4
PAINLEVEL_OUTOF10: 6
PAINLEVEL_OUTOF10: 8
PAINLEVEL_OUTOF10: 7

## 2019-07-04 ASSESSMENT — PAIN DESCRIPTION - FREQUENCY
FREQUENCY: CONTINUOUS
FREQUENCY: INTERMITTENT

## 2019-07-04 ASSESSMENT — PAIN - FUNCTIONAL ASSESSMENT
PAIN_FUNCTIONAL_ASSESSMENT: PREVENTS OR INTERFERES SOME ACTIVE ACTIVITIES AND ADLS
PAIN_FUNCTIONAL_ASSESSMENT: ACTIVITIES ARE NOT PREVENTED

## 2019-07-04 ASSESSMENT — PAIN DESCRIPTION - PROGRESSION: CLINICAL_PROGRESSION: NOT CHANGED

## 2019-07-04 ASSESSMENT — PAIN DESCRIPTION - ONSET
ONSET: ON-GOING
ONSET: GRADUAL

## 2019-07-04 NOTE — PROGRESS NOTES
ID Progress Note                [address] [facility], [place], [address]            Phone [phone]     Fax [phone]      Chief complaint   Left foot swelling and redness    Subjective:  Pain/swelling/redness slightly better today but had increased drainage last night  subjective chills no more  The patient is awake and alert. Tolerating medications. Reports no side effects. Afebrile. 10 ROS otherwise negative unless otherwise specified above. Objective:    Vitals:    07/04/19 1230   BP: 119/83   Pulse: 97   Resp: 14   Temp: 97.7 °F (36.5 °C)   SpO2: 97%       General appearance: Alert, Awake, Oriented times 3, no distress  Skin: Warm and dry  Eyes: Pink palpebral conjunctivae. PERRL  Ears: External ears normal.  Nose/Sinuses: Nares normal. Septum midline. Mucosa normal. No sinus tenderness. Oropharynx: Oropharynx clear with no exudates seen  Neck: Neck supple. No jugular venous distension, lymphadenopathy or thyromegaly Trachea midline  Lungs: Lungs clear to auscultation bilaterally. No rhonchi, crackles or wheezes  Heart: S1 S2  Regular rate and rhythm. No rub, murmur or gallop  Abdomen: Abdomen soft, non-tender. BS normal. No masses, organomegaly  Extremities: No edema, Peripheral pulses palpable  Musculoskeletal:  Dressings opened-redness seems to be improving but very tender, currently no drainage on squeezing  Left foot medial side incision site itself is intact but there is redness, tenderness, currently not much of discharge, other incision site on dorsal aspect is intact -      Labs:  No results for input(s): WBC, RBC, HGB, HCT, MCV, MCH, MCHC, RDW, PLT, MPV in the last 72 hours.   CMP:    Lab Results   Component Value Date     07/01/2019    K 4.2 07/01/2019    CL 98 07/01/2019    CO2 24 07/01/2019    BUN 10 07/01/2019    CREATININE 1.1 07/04/2019    GFRAA >60 07/04/2019    LABGLOM >60 07/04/2019    GLUCOSE 184 07/01/2019    GLUCOSE 125 05/11/2012    PROT 7.3

## 2019-07-04 NOTE — PROGRESS NOTES
Pharmacy Consultation Note  (Antibiotic Dosing and Monitoring)    Initial consult date: 19  Consulting physician: Krista Castillo   Drug(s):  Vancomycin    Indication: Diabetic foot infection    Ht Readings from Last 1 Encounters:   19 6' (1.829 m)     Wt Readings from Last 1 Encounters:   19 (!) 307 lb 8 oz (139.5 kg)       Age/  Gender IBW DW  Allergy Information   37 y.o.     male 77.6 kg 100 to 116 kg (AjBW)  Seasonal and Tramadol                 Date  WBC BUN/CR Drug/Dose Time   Given Level(s)   (Time) Comments    9.5 12/0.9 Vancomycin 2000 mg IV x1       1246         7/1 5.5 10/0.9 Vancomycin 2000 mg IV every 12 hours 0023  1211     7/2 X X Vancomycin 2000 mg IV every 12 hours  ----  Vancomycin extra   500 mg dose 0138  1614  ----  2044 Vancomycin trough level = 14.9 @ 1305    7/3 X X Vancomycin 2500 mg IV every 12 hours  0634  205 - DOSE HELD       1.1 Vancomycin 2500 mg IV every 12 hours  0803 Vancomycin trough level 13.3 mcg/mL @0501  (~23 hrs from last dose)    Vancomycin trough level @1930      Estimated CrCl:  > 100 mL/min    Intake/Output Summary (Last 24 hours) at 2019 1409  Last data filed at 7/3/2019 1731  Gross per 24 hour   Intake 240 ml   Output --   Net 240 ml   incomplete documentation    Temp max: Temp (24hrs), Av.8 °F (36.6 °C), Min:97 °F (36.1 °C), Max:98.4 °F (36.9 °C)      Cultures:  available culture and sensitivity results were reviewed in EPIC  Blood Culture #1 (): NGTD  Blood Culture #2 (): NGTD  Wound Culture - foot (): mixed jf including GNR, alpha hemolytic strep species, staph aureus (vanco CARLA = 1)    Assessment:  · Patient presented with cellulitis of left foot; s/p procedure on left foot 2.5 weeks ago (left foot tarsal tunnel release done 19) and now has redness and swelling around the incision site (no abx given post-op).   · Pharmacy consulted to dose vancomycin by NORM Enciso  · ID and Podiatry have also been

## 2019-07-05 VITALS
OXYGEN SATURATION: 94 % | WEIGHT: 307.4 LBS | HEART RATE: 88 BPM | DIASTOLIC BLOOD PRESSURE: 70 MMHG | TEMPERATURE: 98.1 F | BODY MASS INDEX: 41.63 KG/M2 | HEIGHT: 72 IN | SYSTOLIC BLOOD PRESSURE: 114 MMHG | RESPIRATION RATE: 18 BRPM

## 2019-07-05 LAB
BLOOD CULTURE, ROUTINE: NORMAL
CREAT SERPL-MCNC: 1.1 MG/DL (ref 0.7–1.2)
CULTURE, BLOOD 2: NORMAL
GFR AFRICAN AMERICAN: >60
GFR NON-AFRICAN AMERICAN: >60 ML/MIN/1.73

## 2019-07-05 PROCEDURE — 6370000000 HC RX 637 (ALT 250 FOR IP): Performed by: INTERNAL MEDICINE

## 2019-07-05 PROCEDURE — 2580000003 HC RX 258: Performed by: NURSE PRACTITIONER

## 2019-07-05 PROCEDURE — 36415 COLL VENOUS BLD VENIPUNCTURE: CPT

## 2019-07-05 PROCEDURE — 6370000000 HC RX 637 (ALT 250 FOR IP): Performed by: NURSE PRACTITIONER

## 2019-07-05 PROCEDURE — 6360000002 HC RX W HCPCS: Performed by: NURSE PRACTITIONER

## 2019-07-05 PROCEDURE — 6360000002 HC RX W HCPCS

## 2019-07-05 PROCEDURE — 82565 ASSAY OF CREATININE: CPT

## 2019-07-05 PROCEDURE — 2580000003 HC RX 258

## 2019-07-05 RX ORDER — AMOXICILLIN AND CLAVULANATE POTASSIUM 875; 125 MG/1; MG/1
1 TABLET, FILM COATED ORAL 2 TIMES DAILY
Qty: 42 TABLET | Refills: 0 | Status: SHIPPED | OUTPATIENT
Start: 2019-07-05 | End: 2019-07-26

## 2019-07-05 RX ORDER — DOXYCYCLINE HYCLATE 100 MG/1
100 CAPSULE ORAL 2 TIMES DAILY
Qty: 42 CAPSULE | Refills: 0 | Status: SHIPPED | OUTPATIENT
Start: 2019-07-05 | End: 2019-07-26

## 2019-07-05 RX ADMIN — OXYCODONE HYDROCHLORIDE AND ACETAMINOPHEN 2 TABLET: 5; 325 TABLET ORAL at 16:19

## 2019-07-05 RX ADMIN — CEFEPIME HYDROCHLORIDE 2 G: 2 INJECTION, POWDER, FOR SOLUTION INTRAVENOUS at 09:09

## 2019-07-05 RX ADMIN — OXYCODONE HYDROCHLORIDE AND ACETAMINOPHEN 2 TABLET: 5; 325 TABLET ORAL at 10:05

## 2019-07-05 RX ADMIN — FENOFIBRATE 54 MG: 54 TABLET ORAL at 09:15

## 2019-07-05 RX ADMIN — FLUTICASONE PROPIONATE 2 SPRAY: 50 SPRAY, METERED NASAL at 09:01

## 2019-07-05 RX ADMIN — VANCOMYCIN HYDROCHLORIDE 2500 MG: 10 INJECTION, POWDER, LYOPHILIZED, FOR SOLUTION INTRAVENOUS at 14:08

## 2019-07-05 RX ADMIN — ASPIRIN 81 MG: 81 TABLET ORAL at 09:00

## 2019-07-05 RX ADMIN — Medication 10 ML: at 09:01

## 2019-07-05 RX ADMIN — PREGABALIN 300 MG: 100 CAPSULE ORAL at 09:00

## 2019-07-05 RX ADMIN — LISINOPRIL 20 MG: 20 TABLET ORAL at 09:00

## 2019-07-05 RX ADMIN — VANCOMYCIN HYDROCHLORIDE 2500 MG: 10 INJECTION, POWDER, LYOPHILIZED, FOR SOLUTION INTRAVENOUS at 00:17

## 2019-07-05 RX ADMIN — DULOXETINE HYDROCHLORIDE 30 MG: 30 CAPSULE, DELAYED RELEASE ORAL at 09:00

## 2019-07-05 RX ADMIN — ENOXAPARIN SODIUM 40 MG: 100 INJECTION SUBCUTANEOUS at 09:00

## 2019-07-05 RX ADMIN — OXYCODONE HYDROCHLORIDE AND ACETAMINOPHEN 2 TABLET: 5; 325 TABLET ORAL at 04:05

## 2019-07-05 ASSESSMENT — PAIN SCALES - GENERAL
PAINLEVEL_OUTOF10: 0
PAINLEVEL_OUTOF10: 7
PAINLEVEL_OUTOF10: 8
PAINLEVEL_OUTOF10: 6

## 2019-07-05 ASSESSMENT — PAIN DESCRIPTION - FREQUENCY: FREQUENCY: CONTINUOUS

## 2019-07-05 ASSESSMENT — PAIN DESCRIPTION - LOCATION: LOCATION: FOOT

## 2019-07-05 ASSESSMENT — PAIN DESCRIPTION - DESCRIPTORS: DESCRIPTORS: ACHING;CONSTANT;DISCOMFORT

## 2019-07-05 ASSESSMENT — PAIN DESCRIPTION - PAIN TYPE: TYPE: SURGICAL PAIN

## 2019-07-05 ASSESSMENT — PAIN DESCRIPTION - ORIENTATION: ORIENTATION: LEFT

## 2019-07-05 ASSESSMENT — PAIN DESCRIPTION - ONSET: ONSET: ON-GOING

## 2019-07-05 ASSESSMENT — PAIN - FUNCTIONAL ASSESSMENT: PAIN_FUNCTIONAL_ASSESSMENT: PREVENTS OR INTERFERES SOME ACTIVE ACTIVITIES AND ADLS

## 2019-07-05 ASSESSMENT — PAIN DESCRIPTION - PROGRESSION
CLINICAL_PROGRESSION: NOT CHANGED
CLINICAL_PROGRESSION: NOT CHANGED

## 2019-07-05 NOTE — PROGRESS NOTES
non-distended with normal bowel sounds. Musculoskeletal:  No clubbing, cyanosis +2 LLE edema. Skin: Skin color, texture, turgor normal.  Left foot in dressing  Neurologic:  Neurovascularly intact without any focal sensory/motor deficits. Psychiatric:  Alert and oriented, thought content appropriate, normal insight  Capillary Refill: Brisk,< 3 seconds   Peripheral Pulses: +2 palpable, equal bilaterally           Labs:   No results for input(s): WBC, HGB, HCT, PLT in the last 72 hours. Recent Labs     07/04/19  0501 07/05/19  0459   CREATININE 1.1 1.1     No results for input(s): AST, ALT, BILIDIR, BILITOT, ALKPHOS in the last 72 hours. No results for input(s): INR in the last 72 hours. No results for input(s): Makeda Self in the last 72 hours. Imaging:  US DUP LOWER EXTREMITY LEFT JORGE   Final Result      No evidence for deep vein thrombosis of the left lower extremity. XR FOOT LEFT (MIN 3 VIEWS)   Final Result      NO ACUTE FRACTURE OR DISLOCATION LEFT FOOT.             Assessment/Plan:  Active Hospital Problems    Diagnosis Date Noted    Cellulitis [L03.90] 07/02/2019    Cellulitis of foot [L03.119] 06/30/2019    Neuropathy [G62.9] 06/30/2019    Cellulitis, wound, post-operative [T81.49XA] 06/30/2019    Left leg swelling [M79.89] 06/30/2019    SIRS (systemic inflammatory response syndrome) (HCC) [R65.10] 06/30/2019    Type 2 diabetes mellitus without complication ([facility] [S80.9] 04/08/2016    Morbid obesity due to excess calories ([facility] [E66.01] 12/17/2015    Essential hypertension [I10] 10/16/2015       Podiatry input noted  US left leg negative for DVT  Management of wound per podiatry   Home meds   SSI   ID input appreciated  continue vanco   Clinically improving  Pt/ot  Possible discharge      DVT Prophylaxis: lovenox   Diet: DIET CARB CONTROL; Carb Control: 4 carb choices (60 gms)/meal  Code Status: Full Code     PT/OT Eval Status: solomon Knox

## 2019-07-05 NOTE — PLAN OF CARE
Problem: Falls - Risk of:  Goal: Will remain free from falls  Description  Will remain free from falls  7/5/2019 1050 by Kristan Huston RN  Outcome: Met This Shift  7/5/2019 0206 by Celina Knox RN  Outcome: Met This Shift  Goal: Absence of physical injury  Description  Absence of physical injury  Outcome: Met This Shift     Problem: Safety:  Goal: Free from accidental physical injury  Description  Free from accidental physical injury  Outcome: Met This Shift  Goal: Free from intentional harm  Description  Free from intentional harm  Outcome: Met This Shift     Problem: Pain:  Goal: Patient's pain/discomfort is manageable  Description  Patient's pain/discomfort is manageable  Outcome: Met This Shift  Goal: Pain level will decrease  Description  Pain level will decrease  Outcome: Met This Shift  Goal: Control of acute pain  Description  Control of acute pain  Outcome: Met This Shift  Goal: Control of chronic pain  Description  Control of chronic pain  Outcome: Met This Shift

## 2019-07-05 NOTE — DISCHARGE INSTR - COC
Continuity of Care Form    Patient Name: Hilda Clifton   :  1975  MRN:  [id number]    Admit date:  2019  Discharge date:  ***    Code Status Order: Full Code   Advance Directives:   Advance Care Flowsheet Documentation     Date/Time Healthcare Directive Type of Healthcare Directive Copy in [address] Agent's Name Healthcare Agent's Phone Number    [phone]  No, patient does not have an advance directive for healthcare treatment -- -- -- -- --          Admitting Physician:  Judy Moulton MD  PCP: IGOR Olivier - CNP    Discharging Nurse: [facility] Unit/Room#: 1710/3035-R  Discharging Unit Phone Number: ***    Emergency Contact:   Extended Emergency Contact Information  Primary Emergency Contact: Brian Del Angel   [address] Phone: [phone]  Relation: Child    Past Surgical History:  Past Surgical History:   Procedure Laterality Date    FOOT SURGERY Right     to treat shattered bones    HERNIA REPAIR      DOUBLE HERNIA    HIP ARTHROPLASTY Left 2016    NECK SURGERY  2000    FUSION    CA COLONOSCOPY FLX DX W/COLLJ SPEC WHEN PFRMD N/A 2018    COLONOSCOPY DIAGNOSTIC performed by Alirio Ahuja MD at [address] [place] [address] Left     Dr. Yudith Moe ARTHROSCOPY Left 11 21 14    SHOULDER SURGERY   &    RIGHT AND LEFT repair of tears    TOTAL HIP ARTHROPLASTY Right 10/31/2016    Total right hip  Mc Bergman MD    VASECTOMY      WRIST SURGERY      LEFT WRIST       Immunization History:   Immunization History   Administered Date(s) Administered    Influenza Virus Vaccine 2015    Influenza, Casimer Pizarro, 3 yrs and older, IM, PF (Fluzone 3 yrs and older or Afluria 5 yrs and older) 10/04/2016, 10/26/2017    Pneumococcal Polysaccharide ([id number]) 10/04/2016    Tdap (Boostrix, Adacel) 10/04/2016       Active Problems:  Patient Active Problem List   Diagnosis Code   

## 2019-07-05 NOTE — PROGRESS NOTES
Pharmacy Consultation Note  (Antibiotic Dosing and Monitoring)    Initial consult date: 19  Consulting physician: Evi Bill   Drug(s):  Vancomycin    Indication: Diabetic foot infection    Ht Readings from Last 1 Encounters:   19 6' (1.829 m)     Wt Readings from Last 1 Encounters:   19 (!) 307 lb 6.4 oz (139.4 kg)       Age/  Gender IBW DW  Allergy Information   37 y.o.     male 77.6 kg 100 to 116 kg (AjBW)  Seasonal and Tramadol                 Date  WBC BUN/CR Drug/Dose Time   Given Level(s)   (Time) Comments    9.5 12/0.9 Vancomycin 2000 mg IV x1       1246         7/1 5.5 10/0.9 Vancomycin 2000 mg IV every 12 hours 0023  1211     7/2 X X Vancomycin 2000 mg IV every 12 hours  ----  Vancomycin extra   500 mg dose 0138  1614  ----  2044 Vancomycin trough level = 14.9 @ 1305    7/3 X X Vancomycin 2500 mg IV every 12 hours  0634  2052 - DOSE HELD     7/4 X 1.1 Vancomycin 2500 mg IV every 12 hours  0803     Vancomycin trough level 13.3 mcg/mL @0501  (~23 hrs from last dose)    Vancomycin trough level 15.1 mcg/mL at 1956 (~12 hours post-dose)     7/5 X 1.1 Vancomycin 2500 mg IV every 12 hours  0017  <1200>       Estimated CrCl:  > 100 mL/min    Intake/Output Summary (Last 24 hours) at 2019 0933  Last data filed at 2019 1430  Gross per 24 hour   Intake 1080 ml   Output --   Net 1080 ml   incomplete documentation    Temp max: Temp (24hrs), Av.8 °F (36.6 °C), Min:97.5 °F (36.4 °C), Max:98.1 °F (36.7 °C)      Cultures:  available culture and sensitivity results were reviewed in EPIC  Blood Culture #1 (): NGTD  Blood Culture #2 (): NGTD  Wound Culture - foot (): mixed jf including GNR, alpha hemolytic strep species, staph aureus (vanco CARLA = 1)    Assessment:  · Patient presented with cellulitis of left foot; s/p procedure on left foot 2.5 weeks ago (left foot tarsal tunnel release done 19) and now has redness and swelling around the incision site (no abx given post-op). · Pharmacy consulted to dose vancomycin by NORM Meyers  · ID and Podiatry have also been consulted  · Goal trough level: ~20 mcg/mL per Dr. Geoffrey Lopez  · SCr is 1.1 mg/dL today; urine output documentation is incomplete  · Vancomycin trough level was 13.3 mcg/mL on AM of 7/4 on vancomycin 2500 mg IV every 12 hours; HOWEVER PT MISSED LAST NIGHTS DOSE so level ~23 hours post dose. Repeat level at 1956 (12 hours post dose) was 15.1 mcg/mL  · Per Dr. Geoffrey Lopez, pharmacy increased the vancomycin dose to target a trough level of 20 mcg/mL.          Plan:  · Continue vancomycin 2500 mg IV every 12 hours for now  · Will repeat trough at 0130 tomorrow; hold dose at 0200 if level > 20 mcg/mL  · Monitor renal function   · Pharmacist will follow and monitor/adjust dosing as necessary      Lakshmi MannD, BCPS 7/5/2019 10:37 AM

## 2019-07-05 NOTE — PROGRESS NOTES
All discharge instructions reviewed with patient at bedside. Patient verbalizes understanding of all medications and importance of follow up appointments. Patient's IV removed. Patient requesting pain medication script for discharge. Perfect serve message sent to Dr. Radha Mason. Awaiting return message.

## 2019-07-06 LAB
ORGANISM: ABNORMAL
ORGANISM: ABNORMAL
WOUND/ABSCESS: ABNORMAL

## 2019-07-22 ENCOUNTER — HOSPITAL ENCOUNTER (OUTPATIENT)
Age: 44
Discharge: HOME | End: 2019-07-24
Payer: COMMERCIAL

## 2019-07-22 ENCOUNTER — OFFICE VISIT (OUTPATIENT)
Dept: FAMILY MEDICINE CLINIC | Age: 44
End: 2019-07-22
Payer: COMMERCIAL

## 2019-07-22 ENCOUNTER — HOSPITAL ENCOUNTER (OUTPATIENT)
Dept: GENERAL RADIOLOGY | Age: 44
Discharge: HOME | End: 2019-07-24
Payer: COMMERCIAL

## 2019-07-22 VITALS
OXYGEN SATURATION: 95 % | SYSTOLIC BLOOD PRESSURE: 131 MMHG | RESPIRATION RATE: 16 BRPM | TEMPERATURE: 97.4 F | BODY MASS INDEX: 40.96 KG/M2 | HEART RATE: 109 BPM | WEIGHT: 302 LBS | DIASTOLIC BLOOD PRESSURE: 76 MMHG

## 2019-07-22 DIAGNOSIS — Z01.818 PREOP EXAMINATION: Primary | ICD-10-CM

## 2019-07-22 DIAGNOSIS — S49.91XA ARM INJURY, RIGHT, INITIAL ENCOUNTER: ICD-10-CM

## 2019-07-22 DIAGNOSIS — E11.9 TYPE 2 DIABETES MELLITUS WITHOUT COMPLICATION, WITHOUT LONG-TERM CURRENT USE OF INSULIN (HCC): ICD-10-CM

## 2019-07-22 DIAGNOSIS — M79.2 NEUROPATHIC PAIN: ICD-10-CM

## 2019-07-22 DIAGNOSIS — M79.601 RIGHT ARM PAIN: ICD-10-CM

## 2019-07-22 LAB — HBA1C MFR BLD: 7 %

## 2019-07-22 PROCEDURE — 73090 X-RAY EXAM OF FOREARM: CPT

## 2019-07-22 PROCEDURE — 4004F PT TOBACCO SCREEN RCVD TLK: CPT | Performed by: NURSE PRACTITIONER

## 2019-07-22 PROCEDURE — G8417 CALC BMI ABV UP PARAM F/U: HCPCS | Performed by: NURSE PRACTITIONER

## 2019-07-22 PROCEDURE — 99214 OFFICE O/P EST MOD 30 MIN: CPT | Performed by: NURSE PRACTITIONER

## 2019-07-22 PROCEDURE — 3045F PR MOST RECENT HEMOGLOBIN A1C LEVEL 7.0-9.0%: CPT | Performed by: NURSE PRACTITIONER

## 2019-07-22 PROCEDURE — 2022F DILAT RTA XM EVC RTNOPTHY: CPT | Performed by: NURSE PRACTITIONER

## 2019-07-22 PROCEDURE — 1111F DSCHRG MED/CURRENT MED MERGE: CPT | Performed by: NURSE PRACTITIONER

## 2019-07-22 PROCEDURE — G8427 DOCREV CUR MEDS BY ELIG CLIN: HCPCS | Performed by: NURSE PRACTITIONER

## 2019-07-22 PROCEDURE — 83036 HEMOGLOBIN GLYCOSYLATED A1C: CPT | Performed by: NURSE PRACTITIONER

## 2019-07-22 PROCEDURE — 73060 X-RAY EXAM OF HUMERUS: CPT

## 2019-07-22 RX ORDER — HYDROCODONE BITARTRATE AND ACETAMINOPHEN 5; 325 MG/1; MG/1
1 TABLET ORAL EVERY 8 HOURS PRN
Qty: 21 TABLET | Refills: 0 | Status: SHIPPED | OUTPATIENT
Start: 2019-07-22 | End: 2019-07-29

## 2019-07-22 RX ORDER — DULOXETIN HYDROCHLORIDE 30 MG/1
30 CAPSULE, DELAYED RELEASE ORAL DAILY
Qty: 30 CAPSULE | Refills: 5 | Status: CANCELLED | OUTPATIENT
Start: 2019-07-22

## 2019-07-22 RX ORDER — LISINOPRIL 20 MG/1
TABLET ORAL
Qty: 30 TABLET | Refills: 5 | Status: CANCELLED | OUTPATIENT
Start: 2019-07-22

## 2019-07-22 RX ORDER — PREGABALIN 300 MG/1
CAPSULE ORAL
Qty: 60 CAPSULE | Refills: 3 | Status: SHIPPED | OUTPATIENT
Start: 2019-07-22 | End: 2019-10-21 | Stop reason: SDUPTHER

## 2019-07-22 RX ORDER — PRAVASTATIN SODIUM 20 MG
TABLET ORAL
Qty: 30 TABLET | Refills: 5 | Status: CANCELLED | OUTPATIENT
Start: 2019-07-22

## 2019-07-22 RX ORDER — FLASH GLUCOSE SENSOR
KIT MISCELLANEOUS
Qty: 1 EACH | Refills: 0 | Status: SHIPPED | OUTPATIENT
Start: 2019-07-22 | End: 2019-11-01

## 2019-07-22 RX ORDER — FENOFIBRATE 54 MG/1
54 TABLET ORAL DAILY
Qty: 30 TABLET | Refills: 5 | Status: CANCELLED | OUTPATIENT
Start: 2019-07-22

## 2019-07-22 NOTE — PROGRESS NOTES
Preoperative Consultation      Melinda Burkitt II  YOB: 1975    Date of Service:  7/22/2019    Vitals:    07/22/19 0909   BP: 131/76   Site: Right Upper Arm   Position: Sitting   Cuff Size: Large Adult   Pulse: 109   Resp: 16   Temp: 97.4 °F (36.3 °C)   TempSrc: Temporal   SpO2: 95%   Weight: (!) 302 lb (137 kg)      Wt Readings from Last 2 Encounters:   07/22/19 (!) 302 lb (137 kg)   07/05/19 (!) 307 lb 6.4 oz (139.4 kg)     BP Readings from Last 3 Encounters:   07/22/19 131/76   07/05/19 114/70   05/15/19 118/66        Chief Complaint   Patient presents with    Pre-op Exam     EKG DONE 6/30     Allergies   Allergen Reactions    Seasonal      HAYFEVER / sneezing,watery eyes    Tramadol Itching     No outpatient medications have been marked as taking for the 7/22/19 encounter (Office Visit) with IGOR Bustamante CNP. This patient presents to the office today for a preoperative consultation at the request of surgeon, Dr. Frederick Soriano, who plans on performing Right Tarsal Tunnel Release on August 1 at [address] [place] [address].         Planned anesthesia: IV sedation   Known anesthesia problems: None   Bleeding risk: No recent or remote history of abnormal bleeding  Personal or FH of DVT/PE: No    Patient objection to receiving blood products: No    Patient Active Problem List   Diagnosis    Neuropathic pain    Lumbar spondylosis    Osteoarthritis    Insomnia    Fibromyalgia    Vitamin D deficiency    Essential hypertension    Allergic rhinitis    Lumbar radiculopathy    Osteoarthritis of spine with radiculopathy, lumbar region    Morbid obesity due to excess calories (HCC)    Lumbar disc herniation    Type 2 diabetes mellitus without complication (HCC)    Primary osteoarthritis of left hip    Primary osteoarthritis of right hip    Cellulitis of foot    Neuropathy    Cellulitis, wound, post-operative    Left leg swelling    SIRS (systemic inflammatory response syndrome) ([facility] [address] [facility]    team  5.  No contraindications to planned surgery

## 2019-07-24 ASSESSMENT — ENCOUNTER SYMPTOMS
COLOR CHANGE: 0
CHOKING: 0
APNEA: 0
PHOTOPHOBIA: 0
VOMITING: 0
CONSTIPATION: 0
SINUS PRESSURE: 0
NAUSEA: 0
COUGH: 0
SORE THROAT: 0
ABDOMINAL PAIN: 0
EYE PAIN: 0
ANAL BLEEDING: 0
SINUS PAIN: 0
VOICE CHANGE: 0
WHEEZING: 0
RHINORRHEA: 0
CHEST TIGHTNESS: 0
DIARRHEA: 0
SHORTNESS OF BREATH: 0
EYE ITCHING: 0
EYE REDNESS: 0
TROUBLE SWALLOWING: 0
STRIDOR: 0
ABDOMINAL DISTENTION: 0
BLOOD IN STOOL: 0
EYE DISCHARGE: 0
RECTAL PAIN: 0

## 2019-08-12 ENCOUNTER — HOSPITAL ENCOUNTER (EMERGENCY)
Age: 44
Discharge: HOME | End: 2019-08-12
Payer: COMMERCIAL

## 2019-08-12 VITALS
TEMPERATURE: 99.8 F | BODY MASS INDEX: 39.1 KG/M2 | DIASTOLIC BLOOD PRESSURE: 69 MMHG | SYSTOLIC BLOOD PRESSURE: 127 MMHG | RESPIRATION RATE: 18 BRPM | HEIGHT: 73 IN | WEIGHT: 295 LBS | OXYGEN SATURATION: 98 % | HEART RATE: 99 BPM

## 2019-08-12 DIAGNOSIS — T81.31XA DEHISCENCE OF OPERATIVE WOUND, INITIAL ENCOUNTER: Primary | ICD-10-CM

## 2019-08-12 PROCEDURE — 99283 EMERGENCY DEPT VISIT LOW MDM: CPT

## 2019-08-12 PROCEDURE — 6370000000 HC RX 637 (ALT 250 FOR IP): Performed by: NURSE PRACTITIONER

## 2019-08-12 RX ORDER — DIAPER,BRIEF,INFANT-TODD,DISP
EACH MISCELLANEOUS
Status: DISCONTINUED
Start: 2019-08-12 | End: 2019-08-13 | Stop reason: HOSPADM

## 2019-08-12 RX ORDER — OXYCODONE HYDROCHLORIDE AND ACETAMINOPHEN 5; 325 MG/1; MG/1
1 TABLET ORAL ONCE
Status: COMPLETED | OUTPATIENT
Start: 2019-08-12 | End: 2019-08-12

## 2019-08-12 RX ADMIN — OXYCODONE HYDROCHLORIDE AND ACETAMINOPHEN 1 TABLET: 5; 325 TABLET ORAL at 21:47

## 2019-08-12 RX ADMIN — MUPIROCIN: 20 OINTMENT TOPICAL at 21:48

## 2019-08-12 ASSESSMENT — PAIN DESCRIPTION - FREQUENCY: FREQUENCY: CONTINUOUS

## 2019-08-12 ASSESSMENT — PAIN DESCRIPTION - PAIN TYPE: TYPE: ACUTE PAIN

## 2019-08-12 ASSESSMENT — PAIN SCALES - GENERAL
PAINLEVEL_OUTOF10: 8
PAINLEVEL_OUTOF10: 10

## 2019-08-12 ASSESSMENT — PAIN DESCRIPTION - LOCATION: LOCATION: FOOT

## 2019-08-12 ASSESSMENT — PAIN DESCRIPTION - ORIENTATION: ORIENTATION: RIGHT

## 2019-08-12 ASSESSMENT — PAIN DESCRIPTION - DESCRIPTORS: DESCRIPTORS: THROBBING

## 2019-08-13 NOTE — ED PROVIDER NOTES
Independent    HPI:  8/12/19, Time: 8:52 PM         Cullen Moore II is a 37 y.o. male presenting to the ED for right foot wound dehiscence. Patient reports having recent surgery by but I addressed Dr. Kendall Redding for a nerve repair on August 1. Patient reports he was seen in the office today and states that around 3:30 PM after the sutures were completely removed earlier in the morning his wound completely dehisced open. Patient reports he is also having drainage to the right medial aspect of the ankle surgical incision site as well. Denies fevers. He reports that he never attempted to call the physician to make him aware that the wound opened up after sutures were removed. Patient presents now with concerns because he now is starting to have pain. There is no drainage from the wound dehiscence site noted. Review of Systems:   Pertinent positives and negatives are stated within HPI, all other systems reviewed and are negative.          --------------------------------------------- PAST HISTORY ---------------------------------------------  Past Medical History:  has a past medical history of SIMÓN (acute kidney injury) ([facility] Allergic rhinitis, Chronic back pain, Depression, Diabetes mellitus ([facility] Difficulty sleeping, Displacement of lumbar intervertebral disc without myelopathy, Fibromyalgia, Fractured rib, H/O seasonal allergies, Head injury, Hyperlipidemia, Hypertension, Obesity (BMI 35.0-39.9 without comorbidity), Osteoarthritis, and Thoracic or lumbosacral neuritis or radiculitis, unspecified. Past Surgical History:  has a past surgical history that includes Neck surgery (2000); shoulder surgery (2001 &2007); Wrist surgery (2007); Rotator cuff repair (Left, 2014); hernia repair (2001); Shoulder arthroscopy (Left, 11 21 14); Vasectomy; Hip Arthroplasty (Left, 4/11/2016); Total hip arthroplasty (Right, 10/31/2016);  Foot surgery (Right, 1985); and pr colonoscopy flx dx w/collj spec when pfrmd (N/A,

## 2019-10-21 ENCOUNTER — OFFICE VISIT (OUTPATIENT)
Dept: FAMILY MEDICINE CLINIC | Age: 44
End: 2019-10-21
Payer: COMMERCIAL

## 2019-10-21 ENCOUNTER — HOSPITAL ENCOUNTER (OUTPATIENT)
Age: 44
Discharge: HOME | End: 2019-10-23
Payer: COMMERCIAL

## 2019-10-21 VITALS
HEART RATE: 89 BPM | RESPIRATION RATE: 16 BRPM | HEIGHT: 73 IN | SYSTOLIC BLOOD PRESSURE: 113 MMHG | WEIGHT: 297.4 LBS | DIASTOLIC BLOOD PRESSURE: 73 MMHG | OXYGEN SATURATION: 98 % | BODY MASS INDEX: 39.42 KG/M2

## 2019-10-21 DIAGNOSIS — K45.8 OTHER SPECIFIED ABDOMINAL HERNIA WITHOUT OBSTRUCTION OR GANGRENE: ICD-10-CM

## 2019-10-21 DIAGNOSIS — E11.9 TYPE 2 DIABETES MELLITUS WITHOUT COMPLICATION, WITHOUT LONG-TERM CURRENT USE OF INSULIN (HCC): Primary | ICD-10-CM

## 2019-10-21 DIAGNOSIS — E78.2 MIXED HYPERLIPIDEMIA: ICD-10-CM

## 2019-10-21 DIAGNOSIS — M79.2 NEUROPATHIC PAIN: ICD-10-CM

## 2019-10-21 DIAGNOSIS — E11.9 TYPE 2 DIABETES MELLITUS WITHOUT COMPLICATION, WITHOUT LONG-TERM CURRENT USE OF INSULIN (HCC): ICD-10-CM

## 2019-10-21 DIAGNOSIS — M54.16 LUMBAR RADICULOPATHY: ICD-10-CM

## 2019-10-21 DIAGNOSIS — I10 ESSENTIAL HYPERTENSION: ICD-10-CM

## 2019-10-21 LAB
ALBUMIN SERPL-MCNC: 4.5 G/DL (ref 3.5–5.2)
ALP BLD-CCNC: 123 U/L (ref 40–129)
ALT SERPL-CCNC: 20 U/L (ref 0–40)
ANION GAP SERPL CALCULATED.3IONS-SCNC: 13 MMOL/L (ref 7–16)
AST SERPL-CCNC: 19 U/L (ref 0–39)
BASOPHILS ABSOLUTE: 0.05 E9/L (ref 0–0.2)
BASOPHILS RELATIVE PERCENT: 0.7 % (ref 0–2)
BILIRUB SERPL-MCNC: 0.3 MG/DL (ref 0–1.2)
BUN BLDV-MCNC: 16 MG/DL (ref 6–20)
CALCIUM SERPL-MCNC: 9.6 MG/DL (ref 8.6–10.2)
CHLORIDE BLD-SCNC: 97 MMOL/L (ref 98–107)
CHOLESTEROL, TOTAL: 208 MG/DL (ref 0–199)
CO2: 24 MMOL/L (ref 22–29)
CREAT SERPL-MCNC: 0.9 MG/DL (ref 0.7–1.2)
CREATININE URINE POCT: NORMAL
EOSINOPHILS ABSOLUTE: 0.3 E9/L (ref 0.05–0.5)
EOSINOPHILS RELATIVE PERCENT: 4.1 % (ref 0–6)
GFR AFRICAN AMERICAN: >60
GFR NON-AFRICAN AMERICAN: >60 ML/MIN/1.73
GLUCOSE BLD-MCNC: 175 MG/DL (ref 74–99)
HBA1C MFR BLD: 7.7 %
HCT VFR BLD CALC: 45.6 % (ref 37–54)
HDLC SERPL-MCNC: 36 MG/DL
HEMOGLOBIN: 15.3 G/DL (ref 12.5–16.5)
IMMATURE GRANULOCYTES #: 0.04 E9/L
IMMATURE GRANULOCYTES %: 0.5 % (ref 0–5)
LDL CHOLESTEROL CALCULATED: 120 MG/DL (ref 0–99)
LYMPHOCYTES ABSOLUTE: 1.68 E9/L (ref 1.5–4)
LYMPHOCYTES RELATIVE PERCENT: 23 % (ref 20–42)
MCH RBC QN AUTO: 30.1 PG (ref 26–35)
MCHC RBC AUTO-ENTMCNC: 33.6 % (ref 32–34.5)
MCV RBC AUTO: 89.8 FL (ref 80–99.9)
MICROALBUMIN/CREAT 24H UR: NORMAL MG/G{CREAT}
MICROALBUMIN/CREAT UR-RTO: NORMAL
MONOCYTES ABSOLUTE: 0.61 E9/L (ref 0.1–0.95)
MONOCYTES RELATIVE PERCENT: 8.4 % (ref 2–12)
NEUTROPHILS ABSOLUTE: 4.61 E9/L (ref 1.8–7.3)
NEUTROPHILS RELATIVE PERCENT: 63.3 % (ref 43–80)
PDW BLD-RTO: 12.6 FL (ref 11.5–15)
PLATELET # BLD: 253 E9/L (ref 130–450)
PMV BLD AUTO: 10.2 FL (ref 7–12)
POTASSIUM SERPL-SCNC: 4.9 MMOL/L (ref 3.5–5)
RBC # BLD: 5.08 E12/L (ref 3.8–5.8)
SODIUM BLD-SCNC: 134 MMOL/L (ref 132–146)
TOTAL PROTEIN: 7.4 G/DL (ref 6.4–8.3)
TRIGL SERPL-MCNC: 260 MG/DL (ref 0–149)
TSH SERPL DL<=0.05 MIU/L-ACNC: 0.95 UIU/ML (ref 0.27–4.2)
VLDLC SERPL CALC-MCNC: 52 MG/DL
WBC # BLD: 7.3 E9/L (ref 4.5–11.5)

## 2019-10-21 PROCEDURE — G8417 CALC BMI ABV UP PARAM F/U: HCPCS | Performed by: NURSE PRACTITIONER

## 2019-10-21 PROCEDURE — 80061 LIPID PANEL: CPT

## 2019-10-21 PROCEDURE — 2022F DILAT RTA XM EVC RTNOPTHY: CPT | Performed by: NURSE PRACTITIONER

## 2019-10-21 PROCEDURE — 36415 COLL VENOUS BLD VENIPUNCTURE: CPT

## 2019-10-21 PROCEDURE — 84443 ASSAY THYROID STIM HORMONE: CPT

## 2019-10-21 PROCEDURE — 85025 COMPLETE CBC W/AUTO DIFF WBC: CPT

## 2019-10-21 PROCEDURE — 4004F PT TOBACCO SCREEN RCVD TLK: CPT | Performed by: NURSE PRACTITIONER

## 2019-10-21 PROCEDURE — 96372 THER/PROPH/DIAG INJ SC/IM: CPT | Performed by: NURSE PRACTITIONER

## 2019-10-21 PROCEDURE — G8484 FLU IMMUNIZE NO ADMIN: HCPCS | Performed by: NURSE PRACTITIONER

## 2019-10-21 PROCEDURE — G8427 DOCREV CUR MEDS BY ELIG CLIN: HCPCS | Performed by: NURSE PRACTITIONER

## 2019-10-21 PROCEDURE — 82044 UR ALBUMIN SEMIQUANTITATIVE: CPT | Performed by: NURSE PRACTITIONER

## 2019-10-21 PROCEDURE — 80053 COMPREHEN METABOLIC PANEL: CPT

## 2019-10-21 PROCEDURE — 83036 HEMOGLOBIN GLYCOSYLATED A1C: CPT | Performed by: NURSE PRACTITIONER

## 2019-10-21 PROCEDURE — 99214 OFFICE O/P EST MOD 30 MIN: CPT | Performed by: NURSE PRACTITIONER

## 2019-10-21 RX ORDER — PRAVASTATIN SODIUM 20 MG
TABLET ORAL
Qty: 90 TABLET | Refills: 1 | Status: SHIPPED
Start: 2019-10-21 | End: 2020-05-14 | Stop reason: SDUPTHER

## 2019-10-21 RX ORDER — FLUTICASONE PROPIONATE 50 MCG
2 SPRAY, SUSPENSION (ML) NASAL DAILY
Qty: 1 BOTTLE | Refills: 5 | Status: SHIPPED
Start: 2019-10-21 | End: 2020-04-22

## 2019-10-21 RX ORDER — CYCLOBENZAPRINE HCL 10 MG
10 TABLET ORAL 3 TIMES DAILY PRN
Qty: 270 TABLET | Refills: 1 | Status: SHIPPED
Start: 2019-10-21 | End: 2020-03-23

## 2019-10-21 RX ORDER — FENOFIBRATE 54 MG/1
54 TABLET ORAL DAILY
Qty: 90 TABLET | Refills: 1 | Status: SHIPPED
Start: 2019-10-21 | End: 2020-03-23

## 2019-10-21 RX ORDER — DULOXETIN HYDROCHLORIDE 30 MG/1
30 CAPSULE, DELAYED RELEASE ORAL DAILY
Qty: 90 CAPSULE | Refills: 1 | Status: SHIPPED
Start: 2019-10-21 | End: 2020-05-14 | Stop reason: SDUPTHER

## 2019-10-21 RX ORDER — ASPIRIN 81 MG/1
TABLET ORAL
Qty: 90 TABLET | Refills: 1 | Status: SHIPPED
Start: 2019-10-21 | End: 2020-05-14 | Stop reason: SDUPTHER

## 2019-10-21 RX ORDER — LISINOPRIL 20 MG/1
TABLET ORAL
Qty: 90 TABLET | Refills: 1 | Status: SHIPPED
Start: 2019-10-21 | End: 2020-05-14 | Stop reason: SDUPTHER

## 2019-10-21 RX ORDER — KETOROLAC TROMETHAMINE 30 MG/ML
60 INJECTION, SOLUTION INTRAMUSCULAR; INTRAVENOUS ONCE
Status: COMPLETED | OUTPATIENT
Start: 2019-10-21 | End: 2019-10-21

## 2019-10-21 RX ADMIN — KETOROLAC TROMETHAMINE 60 MG: 30 INJECTION, SOLUTION INTRAMUSCULAR; INTRAVENOUS at 09:48

## 2019-10-21 ASSESSMENT — ENCOUNTER SYMPTOMS
EYE REDNESS: 0
APNEA: 0
VOMITING: 0
SINUS PAIN: 0
ABDOMINAL DISTENTION: 0
WHEEZING: 0
SINUS PRESSURE: 0
COUGH: 0
CHEST TIGHTNESS: 0
DIARRHEA: 0
FACIAL SWELLING: 0
NAUSEA: 0
STRIDOR: 0
SHORTNESS OF BREATH: 0
PHOTOPHOBIA: 0
CHOKING: 0
CONSTIPATION: 0
COLOR CHANGE: 0
EYE PAIN: 0
RHINORRHEA: 0
EYE ITCHING: 0
BLOOD IN STOOL: 0
VOICE CHANGE: 0
ABDOMINAL PAIN: 0
EYE DISCHARGE: 0
RECTAL PAIN: 0
SORE THROAT: 0
TROUBLE SWALLOWING: 0
ANAL BLEEDING: 0

## 2019-10-22 ENCOUNTER — TELEPHONE (OUTPATIENT)
Dept: SURGERY | Age: 44
End: 2019-10-22

## 2019-11-01 ENCOUNTER — OFFICE VISIT (OUTPATIENT)
Dept: SURGERY | Age: 44
End: 2019-11-01
Payer: COMMERCIAL

## 2019-11-01 VITALS
HEIGHT: 73 IN | WEIGHT: 297 LBS | SYSTOLIC BLOOD PRESSURE: 124 MMHG | DIASTOLIC BLOOD PRESSURE: 82 MMHG | HEART RATE: 115 BPM | BODY MASS INDEX: 39.36 KG/M2 | OXYGEN SATURATION: 97 % | RESPIRATION RATE: 16 BRPM

## 2019-11-01 DIAGNOSIS — M62.08 RECTUS DIASTASIS: ICD-10-CM

## 2019-11-01 DIAGNOSIS — K40.91 UNILATERAL RECURRENT INGUINAL HERNIA WITHOUT OBSTRUCTION OR GANGRENE: ICD-10-CM

## 2019-11-01 PROCEDURE — G8484 FLU IMMUNIZE NO ADMIN: HCPCS | Performed by: SURGERY

## 2019-11-01 PROCEDURE — G8417 CALC BMI ABV UP PARAM F/U: HCPCS | Performed by: SURGERY

## 2019-11-01 PROCEDURE — 4004F PT TOBACCO SCREEN RCVD TLK: CPT | Performed by: SURGERY

## 2019-11-01 PROCEDURE — 99204 OFFICE O/P NEW MOD 45 MIN: CPT | Performed by: SURGERY

## 2019-11-01 PROCEDURE — G8427 DOCREV CUR MEDS BY ELIG CLIN: HCPCS | Performed by: SURGERY

## 2019-11-01 RX ORDER — OXYCODONE AND ACETAMINOPHEN 7.5; 325 MG/1; MG/1
1 TABLET ORAL EVERY 6 HOURS PRN
Refills: 0 | COMMUNITY
Start: 2019-10-26 | End: 2020-05-14

## 2019-11-07 ENCOUNTER — TELEPHONE (OUTPATIENT)
Dept: SURGERY | Age: 44
End: 2019-11-07

## 2019-12-03 RX ORDER — ZOLPIDEM TARTRATE 10 MG/1
10 TABLET ORAL NIGHTLY PRN
COMMUNITY
End: 2020-01-22 | Stop reason: SDUPTHER

## 2019-12-06 ENCOUNTER — HOSPITAL ENCOUNTER (OUTPATIENT)
Dept: PREADMISSION TESTING | Age: 44
Discharge: HOME | End: 2019-12-06
Payer: COMMERCIAL

## 2019-12-06 VITALS
HEART RATE: 106 BPM | SYSTOLIC BLOOD PRESSURE: 123 MMHG | BODY MASS INDEX: 39.49 KG/M2 | RESPIRATION RATE: 18 BRPM | DIASTOLIC BLOOD PRESSURE: 70 MMHG | TEMPERATURE: 98.9 F | OXYGEN SATURATION: 97 % | WEIGHT: 298 LBS | HEIGHT: 73 IN

## 2019-12-06 DIAGNOSIS — Z01.812 PRE-OPERATIVE LABORATORY EXAMINATION: Primary | ICD-10-CM

## 2019-12-06 LAB
ANION GAP SERPL CALCULATED.3IONS-SCNC: 14 MMOL/L (ref 7–16)
BUN BLDV-MCNC: 18 MG/DL (ref 6–20)
CALCIUM SERPL-MCNC: 10.2 MG/DL (ref 8.6–10.2)
CHLORIDE BLD-SCNC: 99 MMOL/L (ref 98–107)
CO2: 23 MMOL/L (ref 22–29)
CREAT SERPL-MCNC: 0.9 MG/DL (ref 0.7–1.2)
GFR AFRICAN AMERICAN: >60
GFR NON-AFRICAN AMERICAN: >60 ML/MIN/1.73
GLUCOSE BLD-MCNC: 133 MG/DL (ref 74–99)
HCT VFR BLD CALC: 41.6 % (ref 37–54)
HEMOGLOBIN: 14.1 G/DL (ref 12.5–16.5)
MCH RBC QN AUTO: 28.8 PG (ref 26–35)
MCHC RBC AUTO-ENTMCNC: 33.9 % (ref 32–34.5)
MCV RBC AUTO: 85.1 FL (ref 80–99.9)
PDW BLD-RTO: 12.5 FL (ref 11.5–15)
PLATELET # BLD: 248 E9/L (ref 130–450)
PMV BLD AUTO: 9.6 FL (ref 7–12)
POTASSIUM SERPL-SCNC: 4.9 MMOL/L (ref 3.5–5)
RBC # BLD: 4.89 E12/L (ref 3.8–5.8)
SODIUM BLD-SCNC: 136 MMOL/L (ref 132–146)
WBC # BLD: 8 E9/L (ref 4.5–11.5)

## 2019-12-06 PROCEDURE — 85027 COMPLETE CBC AUTOMATED: CPT

## 2019-12-06 PROCEDURE — 36415 COLL VENOUS BLD VENIPUNCTURE: CPT

## 2019-12-06 PROCEDURE — 80048 BASIC METABOLIC PNL TOTAL CA: CPT

## 2019-12-06 ASSESSMENT — PAIN DESCRIPTION - DESCRIPTORS: DESCRIPTORS: STABBING;DULL

## 2019-12-06 ASSESSMENT — PAIN DESCRIPTION - FREQUENCY: FREQUENCY: CONTINUOUS

## 2019-12-06 ASSESSMENT — PAIN SCALES - GENERAL: PAINLEVEL_OUTOF10: 7

## 2019-12-09 ENCOUNTER — HOSPITAL ENCOUNTER (OUTPATIENT)
Age: 44
Setting detail: OUTPATIENT SURGERY
Discharge: HOME | End: 2019-12-09
Attending: SURGERY | Admitting: SURGERY
Payer: COMMERCIAL

## 2019-12-09 ENCOUNTER — TELEPHONE (OUTPATIENT)
Dept: SURGERY | Age: 44
End: 2019-12-09

## 2019-12-09 ENCOUNTER — ANESTHESIA (OUTPATIENT)
Dept: OPERATING ROOM | Age: 44
End: 2019-12-09
Payer: COMMERCIAL

## 2019-12-09 ENCOUNTER — ANESTHESIA EVENT (OUTPATIENT)
Dept: OPERATING ROOM | Age: 44
End: 2019-12-09
Payer: COMMERCIAL

## 2019-12-09 VITALS
RESPIRATION RATE: 22 BRPM | TEMPERATURE: 97.1 F | HEART RATE: 117 BPM | HEIGHT: 73 IN | DIASTOLIC BLOOD PRESSURE: 76 MMHG | WEIGHT: 298 LBS | BODY MASS INDEX: 39.49 KG/M2 | SYSTOLIC BLOOD PRESSURE: 133 MMHG | OXYGEN SATURATION: 93 %

## 2019-12-09 VITALS
SYSTOLIC BLOOD PRESSURE: 217 MMHG | OXYGEN SATURATION: 96 % | DIASTOLIC BLOOD PRESSURE: 204 MMHG | RESPIRATION RATE: 7 BRPM

## 2019-12-09 DIAGNOSIS — Z01.812 PRE-OPERATIVE LABORATORY EXAMINATION: ICD-10-CM

## 2019-12-09 LAB
CHP ED QC CHECK: NORMAL
GLUCOSE BLD-MCNC: 173 MG/DL
METER GLUCOSE: 173 MG/DL (ref 74–99)

## 2019-12-09 PROCEDURE — 3600000004 HC SURGERY LEVEL 4 BASE: Performed by: SURGERY

## 2019-12-09 PROCEDURE — 3600000014 HC SURGERY LEVEL 4 ADDTL 15MIN: Performed by: SURGERY

## 2019-12-09 PROCEDURE — 6370000000 HC RX 637 (ALT 250 FOR IP): Performed by: STUDENT IN AN ORGANIZED HEALTH CARE EDUCATION/TRAINING PROGRAM

## 2019-12-09 PROCEDURE — 7100000010 HC PHASE II RECOVERY - FIRST 15 MIN: Performed by: SURGERY

## 2019-12-09 PROCEDURE — 6360000002 HC RX W HCPCS: Performed by: SURGERY

## 2019-12-09 PROCEDURE — 7100000011 HC PHASE II RECOVERY - ADDTL 15 MIN: Performed by: SURGERY

## 2019-12-09 PROCEDURE — 2580000003 HC RX 258: Performed by: SURGERY

## 2019-12-09 PROCEDURE — 3700000000 HC ANESTHESIA ATTENDED CARE: Performed by: SURGERY

## 2019-12-09 PROCEDURE — C1781 MESH (IMPLANTABLE): HCPCS | Performed by: SURGERY

## 2019-12-09 PROCEDURE — 82962 GLUCOSE BLOOD TEST: CPT

## 2019-12-09 PROCEDURE — 2709999900 HC NON-CHARGEABLE SUPPLY: Performed by: SURGERY

## 2019-12-09 PROCEDURE — 7100000001 HC PACU RECOVERY - ADDTL 15 MIN: Performed by: SURGERY

## 2019-12-09 PROCEDURE — 6360000002 HC RX W HCPCS: Performed by: ANESTHESIOLOGIST ASSISTANT

## 2019-12-09 PROCEDURE — 3700000001 HC ADD 15 MINUTES (ANESTHESIA): Performed by: SURGERY

## 2019-12-09 PROCEDURE — 49651 LAP ING HERNIA REPAIR RECUR: CPT | Performed by: SURGERY

## 2019-12-09 PROCEDURE — 2500000003 HC RX 250 WO HCPCS: Performed by: ANESTHESIOLOGIST ASSISTANT

## 2019-12-09 PROCEDURE — 2720000010 HC SURG SUPPLY STERILE: Performed by: SURGERY

## 2019-12-09 PROCEDURE — 6360000002 HC RX W HCPCS: Performed by: ANESTHESIOLOGY

## 2019-12-09 PROCEDURE — 7100000000 HC PACU RECOVERY - FIRST 15 MIN: Performed by: SURGERY

## 2019-12-09 PROCEDURE — 2500000003 HC RX 250 WO HCPCS: Performed by: SURGERY

## 2019-12-09 DEVICE — MESH HERN L W4.1XL6.2IN R POLYPR L PORE KNIT LT 3DMAX: Type: IMPLANTABLE DEVICE | Site: ABDOMEN | Status: FUNCTIONAL

## 2019-12-09 RX ORDER — DIPHENHYDRAMINE HYDROCHLORIDE 50 MG/ML
12.5 INJECTION INTRAMUSCULAR; INTRAVENOUS EVERY 10 MIN PRN
Status: DISCONTINUED | OUTPATIENT
Start: 2019-12-09 | End: 2019-12-09 | Stop reason: HOSPADM

## 2019-12-09 RX ORDER — MEPERIDINE HYDROCHLORIDE 50 MG/ML
12.5 INJECTION INTRAMUSCULAR; INTRAVENOUS; SUBCUTANEOUS EVERY 5 MIN PRN
Status: DISCONTINUED | OUTPATIENT
Start: 2019-12-09 | End: 2019-12-09 | Stop reason: HOSPADM

## 2019-12-09 RX ORDER — NEOSTIGMINE METHYLSULFATE 1 MG/ML
INJECTION, SOLUTION INTRAVENOUS PRN
Status: DISCONTINUED | OUTPATIENT
Start: 2019-12-09 | End: 2019-12-09 | Stop reason: SDUPTHER

## 2019-12-09 RX ORDER — SODIUM CHLORIDE 0.9 % (FLUSH) 0.9 %
10 SYRINGE (ML) INJECTION PRN
Status: DISCONTINUED | OUTPATIENT
Start: 2019-12-09 | End: 2019-12-09 | Stop reason: HOSPADM

## 2019-12-09 RX ORDER — FENTANYL CITRATE 50 UG/ML
INJECTION, SOLUTION INTRAMUSCULAR; INTRAVENOUS PRN
Status: DISCONTINUED | OUTPATIENT
Start: 2019-12-09 | End: 2019-12-09 | Stop reason: SDUPTHER

## 2019-12-09 RX ORDER — LIDOCAINE HYDROCHLORIDE 20 MG/ML
INJECTION, SOLUTION INTRAVENOUS PRN
Status: DISCONTINUED | OUTPATIENT
Start: 2019-12-09 | End: 2019-12-09 | Stop reason: SDUPTHER

## 2019-12-09 RX ORDER — PROMETHAZINE HYDROCHLORIDE 25 MG/ML
6.25 INJECTION, SOLUTION INTRAMUSCULAR; INTRAVENOUS
Status: DISCONTINUED | OUTPATIENT
Start: 2019-12-09 | End: 2019-12-09 | Stop reason: HOSPADM

## 2019-12-09 RX ORDER — IBUPROFEN 600 MG/1
600 TABLET ORAL 4 TIMES DAILY PRN
Qty: 60 TABLET | Refills: 0 | Status: SHIPPED | OUTPATIENT
Start: 2019-12-09 | End: 2020-01-22 | Stop reason: SDUPTHER

## 2019-12-09 RX ORDER — LABETALOL HYDROCHLORIDE 5 MG/ML
5 INJECTION, SOLUTION INTRAVENOUS EVERY 10 MIN PRN
Status: DISCONTINUED | OUTPATIENT
Start: 2019-12-09 | End: 2019-12-09 | Stop reason: HOSPADM

## 2019-12-09 RX ORDER — IBUPROFEN 400 MG/1
600 TABLET ORAL
Status: COMPLETED | OUTPATIENT
Start: 2019-12-09 | End: 2019-12-09

## 2019-12-09 RX ORDER — ROCURONIUM BROMIDE 10 MG/ML
INJECTION, SOLUTION INTRAVENOUS PRN
Status: DISCONTINUED | OUTPATIENT
Start: 2019-12-09 | End: 2019-12-09 | Stop reason: SDUPTHER

## 2019-12-09 RX ORDER — SODIUM CHLORIDE 9 MG/ML
INJECTION, SOLUTION INTRAVENOUS CONTINUOUS
Status: DISCONTINUED | OUTPATIENT
Start: 2019-12-09 | End: 2019-12-09 | Stop reason: HOSPADM

## 2019-12-09 RX ORDER — ESMOLOL HYDROCHLORIDE 10 MG/ML
INJECTION INTRAVENOUS PRN
Status: DISCONTINUED | OUTPATIENT
Start: 2019-12-09 | End: 2019-12-09 | Stop reason: SDUPTHER

## 2019-12-09 RX ORDER — PROPOFOL 10 MG/ML
INJECTION, EMULSION INTRAVENOUS PRN
Status: DISCONTINUED | OUTPATIENT
Start: 2019-12-09 | End: 2019-12-09 | Stop reason: SDUPTHER

## 2019-12-09 RX ORDER — MIDAZOLAM HYDROCHLORIDE 1 MG/ML
INJECTION INTRAMUSCULAR; INTRAVENOUS PRN
Status: DISCONTINUED | OUTPATIENT
Start: 2019-12-09 | End: 2019-12-09 | Stop reason: SDUPTHER

## 2019-12-09 RX ORDER — GLYCOPYRROLATE 1 MG/5 ML
SYRINGE (ML) INTRAVENOUS PRN
Status: DISCONTINUED | OUTPATIENT
Start: 2019-12-09 | End: 2019-12-09 | Stop reason: SDUPTHER

## 2019-12-09 RX ORDER — ONDANSETRON 2 MG/ML
INJECTION INTRAMUSCULAR; INTRAVENOUS PRN
Status: DISCONTINUED | OUTPATIENT
Start: 2019-12-09 | End: 2019-12-09 | Stop reason: SDUPTHER

## 2019-12-09 RX ORDER — HYDRALAZINE HYDROCHLORIDE 20 MG/ML
5 INJECTION INTRAMUSCULAR; INTRAVENOUS EVERY 10 MIN PRN
Status: DISCONTINUED | OUTPATIENT
Start: 2019-12-09 | End: 2019-12-09 | Stop reason: HOSPADM

## 2019-12-09 RX ORDER — DEXAMETHASONE SODIUM PHOSPHATE 10 MG/ML
INJECTION INTRAMUSCULAR; INTRAVENOUS PRN
Status: DISCONTINUED | OUTPATIENT
Start: 2019-12-09 | End: 2019-12-09 | Stop reason: SDUPTHER

## 2019-12-09 RX ORDER — PHENYLEPHRINE HYDROCHLORIDE 10 MG/ML
INJECTION INTRAVENOUS PRN
Status: DISCONTINUED | OUTPATIENT
Start: 2019-12-09 | End: 2019-12-09 | Stop reason: SDUPTHER

## 2019-12-09 RX ORDER — SODIUM CHLORIDE 0.9 % (FLUSH) 0.9 %
10 SYRINGE (ML) INJECTION EVERY 12 HOURS SCHEDULED
Status: DISCONTINUED | OUTPATIENT
Start: 2019-12-09 | End: 2019-12-09 | Stop reason: HOSPADM

## 2019-12-09 RX ADMIN — PHENYLEPHRINE HYDROCHLORIDE 200 MCG: 10 INJECTION INTRAVENOUS at 07:50

## 2019-12-09 RX ADMIN — SUGAMMADEX 500 MG: 100 INJECTION, SOLUTION INTRAVENOUS at 08:53

## 2019-12-09 RX ADMIN — ROCURONIUM BROMIDE 50 MG: 10 INJECTION, SOLUTION INTRAVENOUS at 07:34

## 2019-12-09 RX ADMIN — FENTANYL CITRATE 50 MCG: 50 INJECTION, SOLUTION INTRAMUSCULAR; INTRAVENOUS at 07:58

## 2019-12-09 RX ADMIN — ROCURONIUM BROMIDE 10 MG: 10 INJECTION, SOLUTION INTRAVENOUS at 08:23

## 2019-12-09 RX ADMIN — PHENYLEPHRINE HYDROCHLORIDE 100 MCG: 10 INJECTION INTRAVENOUS at 07:48

## 2019-12-09 RX ADMIN — ROCURONIUM BROMIDE 10 MG: 10 INJECTION, SOLUTION INTRAVENOUS at 07:45

## 2019-12-09 RX ADMIN — ESMOLOL HYDROCHLORIDE 20 MG: 10 INJECTION, SOLUTION INTRAVENOUS at 08:10

## 2019-12-09 RX ADMIN — FENTANYL CITRATE 100 MCG: 50 INJECTION, SOLUTION INTRAMUSCULAR; INTRAVENOUS at 07:33

## 2019-12-09 RX ADMIN — Medication 1 MG: at 08:35

## 2019-12-09 RX ADMIN — LIDOCAINE HYDROCHLORIDE 100 MG: 20 INJECTION, SOLUTION INTRAVENOUS at 07:33

## 2019-12-09 RX ADMIN — PHENYLEPHRINE HYDROCHLORIDE 200 MCG: 10 INJECTION INTRAVENOUS at 07:40

## 2019-12-09 RX ADMIN — MIDAZOLAM 2 MG: 1 INJECTION INTRAMUSCULAR; INTRAVENOUS at 07:29

## 2019-12-09 RX ADMIN — ESMOLOL HYDROCHLORIDE 20 MG: 10 INJECTION, SOLUTION INTRAVENOUS at 08:25

## 2019-12-09 RX ADMIN — SODIUM CHLORIDE: 9 INJECTION, SOLUTION INTRAVENOUS at 08:22

## 2019-12-09 RX ADMIN — SODIUM CHLORIDE: 9 INJECTION, SOLUTION INTRAVENOUS at 06:14

## 2019-12-09 RX ADMIN — Medication 5 MG: at 08:35

## 2019-12-09 RX ADMIN — HYDROMORPHONE HYDROCHLORIDE 0.5 MG: 1 INJECTION, SOLUTION INTRAMUSCULAR; INTRAVENOUS; SUBCUTANEOUS at 09:15

## 2019-12-09 RX ADMIN — PHENYLEPHRINE HYDROCHLORIDE 100 MCG: 10 INJECTION INTRAVENOUS at 08:28

## 2019-12-09 RX ADMIN — HYDROMORPHONE HYDROCHLORIDE 0.5 MG: 1 INJECTION, SOLUTION INTRAMUSCULAR; INTRAVENOUS; SUBCUTANEOUS at 09:36

## 2019-12-09 RX ADMIN — ROCURONIUM BROMIDE 20 MG: 10 INJECTION, SOLUTION INTRAVENOUS at 08:04

## 2019-12-09 RX ADMIN — PROPOFOL 200 MG: 10 INJECTION, EMULSION INTRAVENOUS at 07:33

## 2019-12-09 RX ADMIN — DEXAMETHASONE SODIUM PHOSPHATE 10 MG: 10 INJECTION INTRAMUSCULAR; INTRAVENOUS at 07:42

## 2019-12-09 RX ADMIN — FENTANYL CITRATE 25 MCG: 50 INJECTION, SOLUTION INTRAMUSCULAR; INTRAVENOUS at 09:00

## 2019-12-09 RX ADMIN — HYDROMORPHONE HYDROCHLORIDE 0.5 MG: 1 INJECTION, SOLUTION INTRAMUSCULAR; INTRAVENOUS; SUBCUTANEOUS at 09:24

## 2019-12-09 RX ADMIN — ONDANSETRON HYDROCHLORIDE 4 MG: 2 INJECTION, SOLUTION INTRAMUSCULAR; INTRAVENOUS at 08:30

## 2019-12-09 RX ADMIN — CEFAZOLIN SODIUM 3 G: 10 INJECTION, POWDER, FOR SOLUTION INTRAVENOUS at 07:39

## 2019-12-09 RX ADMIN — PHENYLEPHRINE HYDROCHLORIDE 100 MCG: 10 INJECTION INTRAVENOUS at 07:45

## 2019-12-09 RX ADMIN — HYDROMORPHONE HYDROCHLORIDE 0.5 MG: 1 INJECTION, SOLUTION INTRAMUSCULAR; INTRAVENOUS; SUBCUTANEOUS at 09:30

## 2019-12-09 RX ADMIN — PHENYLEPHRINE HYDROCHLORIDE 200 MCG: 10 INJECTION INTRAVENOUS at 07:42

## 2019-12-09 RX ADMIN — FENTANYL CITRATE 25 MCG: 50 INJECTION, SOLUTION INTRAMUSCULAR; INTRAVENOUS at 08:24

## 2019-12-09 RX ADMIN — IBUPROFEN 600 MG: 400 TABLET, FILM COATED ORAL at 11:18

## 2019-12-09 RX ADMIN — ESMOLOL HYDROCHLORIDE 10 MG: 10 INJECTION, SOLUTION INTRAVENOUS at 08:01

## 2019-12-09 ASSESSMENT — PAIN DESCRIPTION - PAIN TYPE
TYPE: SURGICAL PAIN

## 2019-12-09 ASSESSMENT — PAIN DESCRIPTION - LOCATION
LOCATION: ABDOMEN

## 2019-12-09 ASSESSMENT — PAIN DESCRIPTION - ORIENTATION
ORIENTATION: RIGHT;LEFT

## 2019-12-09 ASSESSMENT — PAIN SCALES - GENERAL
PAINLEVEL_OUTOF10: 3
PAINLEVEL_OUTOF10: 9
PAINLEVEL_OUTOF10: 8
PAINLEVEL_OUTOF10: 5
PAINLEVEL_OUTOF10: 7
PAINLEVEL_OUTOF10: 8
PAINLEVEL_OUTOF10: 9
PAINLEVEL_OUTOF10: 5
PAINLEVEL_OUTOF10: 9
PAINLEVEL_OUTOF10: 3
PAINLEVEL_OUTOF10: 8

## 2019-12-09 ASSESSMENT — PULMONARY FUNCTION TESTS
PIF_VALUE: 11
PIF_VALUE: 36
PIF_VALUE: 39
PIF_VALUE: 26
PIF_VALUE: 40
PIF_VALUE: 37
PIF_VALUE: 42
PIF_VALUE: 25
PIF_VALUE: 41
PIF_VALUE: 40
PIF_VALUE: 40
PIF_VALUE: 29
PIF_VALUE: 2
PIF_VALUE: 39
PIF_VALUE: 27
PIF_VALUE: 32
PIF_VALUE: 40
PIF_VALUE: 13
PIF_VALUE: 27
PIF_VALUE: 41
PIF_VALUE: 7
PIF_VALUE: 19
PIF_VALUE: 38
PIF_VALUE: 22
PIF_VALUE: 14
PIF_VALUE: 25
PIF_VALUE: 26
PIF_VALUE: 40
PIF_VALUE: 28
PIF_VALUE: 25
PIF_VALUE: 38
PIF_VALUE: 39
PIF_VALUE: 39
PIF_VALUE: 25
PIF_VALUE: 18
PIF_VALUE: 41
PIF_VALUE: 25
PIF_VALUE: 38
PIF_VALUE: 29
PIF_VALUE: 25
PIF_VALUE: 40
PIF_VALUE: 40
PIF_VALUE: 35
PIF_VALUE: 41
PIF_VALUE: 14
PIF_VALUE: 26
PIF_VALUE: 0
PIF_VALUE: 0
PIF_VALUE: 33
PIF_VALUE: 33
PIF_VALUE: 37
PIF_VALUE: 27
PIF_VALUE: 29
PIF_VALUE: 9
PIF_VALUE: 39
PIF_VALUE: 40
PIF_VALUE: 1
PIF_VALUE: 1
PIF_VALUE: 40
PIF_VALUE: 26
PIF_VALUE: 41
PIF_VALUE: 1
PIF_VALUE: 9
PIF_VALUE: 40
PIF_VALUE: 41
PIF_VALUE: 3
PIF_VALUE: 35
PIF_VALUE: 41
PIF_VALUE: 40
PIF_VALUE: 25
PIF_VALUE: 29
PIF_VALUE: 1
PIF_VALUE: 38
PIF_VALUE: 27
PIF_VALUE: 41
PIF_VALUE: 36
PIF_VALUE: 39
PIF_VALUE: 41
PIF_VALUE: 39
PIF_VALUE: 36
PIF_VALUE: 6

## 2019-12-09 ASSESSMENT — PAIN - FUNCTIONAL ASSESSMENT: PAIN_FUNCTIONAL_ASSESSMENT: 0-10

## 2019-12-09 ASSESSMENT — PAIN DESCRIPTION - PROGRESSION
CLINICAL_PROGRESSION: GRADUALLY IMPROVING
CLINICAL_PROGRESSION: GRADUALLY WORSENING

## 2019-12-09 ASSESSMENT — PAIN DESCRIPTION - DESCRIPTORS
DESCRIPTORS: ACHING;DISCOMFORT
DESCRIPTORS: ACHING;DISCOMFORT
DESCRIPTORS: ACHING;BURNING
DESCRIPTORS: ACHING;BURNING;CONSTANT;DISCOMFORT
DESCRIPTORS: ACHING;BURNING;CONSTANT
DESCRIPTORS: ACHING;BURNING;CONSTANT
DESCRIPTORS: STABBING;DULL
DESCRIPTORS: ACHING;BURNING;CONSTANT

## 2019-12-09 ASSESSMENT — PAIN DESCRIPTION - FREQUENCY
FREQUENCY: CONTINUOUS

## 2019-12-09 ASSESSMENT — PAIN DESCRIPTION - ONSET
ONSET: ON-GOING
ONSET: ON-GOING

## 2019-12-20 ENCOUNTER — OFFICE VISIT (OUTPATIENT)
Dept: SURGERY | Age: 44
End: 2019-12-20

## 2019-12-20 VITALS — DIASTOLIC BLOOD PRESSURE: 70 MMHG | SYSTOLIC BLOOD PRESSURE: 118 MMHG

## 2019-12-20 DIAGNOSIS — Z48.89 ENCOUNTER FOR POSTOPERATIVE CARE: Primary | ICD-10-CM

## 2019-12-20 PROCEDURE — 99024 POSTOP FOLLOW-UP VISIT: CPT | Performed by: SURGERY

## 2020-01-09 ENCOUNTER — APPOINTMENT (OUTPATIENT)
Dept: CT IMAGING | Age: 45
End: 2020-01-09
Payer: COMMERCIAL

## 2020-01-09 ENCOUNTER — HOSPITAL ENCOUNTER (EMERGENCY)
Age: 45
Discharge: HOME | End: 2020-01-10
Attending: EMERGENCY MEDICINE
Payer: COMMERCIAL

## 2020-01-09 LAB
ALBUMIN SERPL-MCNC: 4.2 G/DL (ref 3.5–5.2)
ALP BLD-CCNC: 136 U/L (ref 40–129)
ALT SERPL-CCNC: 28 U/L (ref 0–40)
ANION GAP SERPL CALCULATED.3IONS-SCNC: 11 MMOL/L (ref 7–16)
AST SERPL-CCNC: 20 U/L (ref 0–39)
BASOPHILS ABSOLUTE: 0.05 E9/L (ref 0–0.2)
BASOPHILS RELATIVE PERCENT: 0.5 % (ref 0–2)
BILIRUB SERPL-MCNC: <0.2 MG/DL (ref 0–1.2)
BUN BLDV-MCNC: 13 MG/DL (ref 6–20)
CALCIUM SERPL-MCNC: 9.7 MG/DL (ref 8.6–10.2)
CHLORIDE BLD-SCNC: 104 MMOL/L (ref 98–107)
CO2: 29 MMOL/L (ref 22–29)
CREAT SERPL-MCNC: 0.9 MG/DL (ref 0.7–1.2)
EOSINOPHILS ABSOLUTE: 0.27 E9/L (ref 0.05–0.5)
EOSINOPHILS RELATIVE PERCENT: 2.9 % (ref 0–6)
GFR AFRICAN AMERICAN: >60
GFR NON-AFRICAN AMERICAN: >60 ML/MIN/1.73
GLUCOSE BLD-MCNC: 224 MG/DL (ref 74–99)
HCT VFR BLD CALC: 43.7 % (ref 37–54)
HEMOGLOBIN: 14.2 G/DL (ref 12.5–16.5)
IMMATURE GRANULOCYTES #: 0.04 E9/L
IMMATURE GRANULOCYTES %: 0.4 % (ref 0–5)
LACTIC ACID, SEPSIS: 1.3 MMOL/L (ref 0.5–1.9)
LACTIC ACID, SEPSIS: 2 MMOL/L (ref 0.5–1.9)
LIPASE: 27 U/L (ref 13–60)
LYMPHOCYTES ABSOLUTE: 1.91 E9/L (ref 1.5–4)
LYMPHOCYTES RELATIVE PERCENT: 20.6 % (ref 20–42)
MAGNESIUM: 1.7 MG/DL (ref 1.6–2.6)
MCH RBC QN AUTO: 28.9 PG (ref 26–35)
MCHC RBC AUTO-ENTMCNC: 32.5 % (ref 32–34.5)
MCV RBC AUTO: 88.8 FL (ref 80–99.9)
MONOCYTES ABSOLUTE: 0.69 E9/L (ref 0.1–0.95)
MONOCYTES RELATIVE PERCENT: 7.4 % (ref 2–12)
NEUTROPHILS ABSOLUTE: 6.33 E9/L (ref 1.8–7.3)
NEUTROPHILS RELATIVE PERCENT: 68.2 % (ref 43–80)
PDW BLD-RTO: 12.4 FL (ref 11.5–15)
PLATELET # BLD: 337 E9/L (ref 130–450)
PMV BLD AUTO: 10 FL (ref 7–12)
POTASSIUM SERPL-SCNC: 4.3 MMOL/L (ref 3.5–5)
RBC # BLD: 4.92 E12/L (ref 3.8–5.8)
SODIUM BLD-SCNC: 144 MMOL/L (ref 132–146)
TOTAL PROTEIN: 7.7 G/DL (ref 6.4–8.3)
WBC # BLD: 9.3 E9/L (ref 4.5–11.5)

## 2020-01-09 PROCEDURE — 85025 COMPLETE CBC W/AUTO DIFF WBC: CPT

## 2020-01-09 PROCEDURE — 6360000002 HC RX W HCPCS: Performed by: EMERGENCY MEDICINE

## 2020-01-09 PROCEDURE — 2580000003 HC RX 258: Performed by: EMERGENCY MEDICINE

## 2020-01-09 PROCEDURE — 6360000004 HC RX CONTRAST MEDICATION: Performed by: RADIOLOGY

## 2020-01-09 PROCEDURE — 83605 ASSAY OF LACTIC ACID: CPT

## 2020-01-09 PROCEDURE — 74177 CT ABD & PELVIS W/CONTRAST: CPT

## 2020-01-09 PROCEDURE — 96374 THER/PROPH/DIAG INJ IV PUSH: CPT

## 2020-01-09 PROCEDURE — 99284 EMERGENCY DEPT VISIT MOD MDM: CPT

## 2020-01-09 PROCEDURE — 96375 TX/PRO/DX INJ NEW DRUG ADDON: CPT

## 2020-01-09 PROCEDURE — 83690 ASSAY OF LIPASE: CPT

## 2020-01-09 PROCEDURE — 2580000003 HC RX 258: Performed by: RADIOLOGY

## 2020-01-09 PROCEDURE — 80053 COMPREHEN METABOLIC PANEL: CPT

## 2020-01-09 PROCEDURE — 36415 COLL VENOUS BLD VENIPUNCTURE: CPT

## 2020-01-09 PROCEDURE — 83735 ASSAY OF MAGNESIUM: CPT

## 2020-01-09 PROCEDURE — 93005 ELECTROCARDIOGRAM TRACING: CPT | Performed by: NURSE PRACTITIONER

## 2020-01-09 RX ORDER — PROMETHAZINE HYDROCHLORIDE 25 MG/ML
12.5 INJECTION, SOLUTION INTRAMUSCULAR; INTRAVENOUS ONCE
Status: COMPLETED | OUTPATIENT
Start: 2020-01-09 | End: 2020-01-09

## 2020-01-09 RX ORDER — 0.9 % SODIUM CHLORIDE 0.9 %
1000 INTRAVENOUS SOLUTION INTRAVENOUS ONCE
Status: COMPLETED | OUTPATIENT
Start: 2020-01-09 | End: 2020-01-09

## 2020-01-09 RX ORDER — ONDANSETRON 2 MG/ML
4 INJECTION INTRAMUSCULAR; INTRAVENOUS ONCE
Status: COMPLETED | OUTPATIENT
Start: 2020-01-09 | End: 2020-01-09

## 2020-01-09 RX ORDER — SODIUM CHLORIDE 0.9 % (FLUSH) 0.9 %
10 SYRINGE (ML) INJECTION ONCE
Status: COMPLETED | OUTPATIENT
Start: 2020-01-09 | End: 2020-01-09

## 2020-01-09 RX ORDER — FENTANYL CITRATE 50 UG/ML
50 INJECTION, SOLUTION INTRAMUSCULAR; INTRAVENOUS ONCE
Status: COMPLETED | OUTPATIENT
Start: 2020-01-09 | End: 2020-01-09

## 2020-01-09 RX ORDER — MORPHINE SULFATE 4 MG/ML
4 INJECTION, SOLUTION INTRAMUSCULAR; INTRAVENOUS ONCE
Status: COMPLETED | OUTPATIENT
Start: 2020-01-09 | End: 2020-01-09

## 2020-01-09 RX ADMIN — ONDANSETRON 4 MG: 2 INJECTION INTRAMUSCULAR; INTRAVENOUS at 21:05

## 2020-01-09 RX ADMIN — MORPHINE SULFATE 4 MG: 4 INJECTION, SOLUTION INTRAMUSCULAR; INTRAVENOUS at 21:05

## 2020-01-09 RX ADMIN — FENTANYL CITRATE 50 MCG: 50 INJECTION, SOLUTION INTRAMUSCULAR; INTRAVENOUS at 23:33

## 2020-01-09 RX ADMIN — IOPAMIDOL 110 ML: 755 INJECTION, SOLUTION INTRAVENOUS at 21:42

## 2020-01-09 RX ADMIN — Medication 10 ML: at 21:42

## 2020-01-09 RX ADMIN — PROMETHAZINE HYDROCHLORIDE 12.5 MG: 25 INJECTION INTRAMUSCULAR; INTRAVENOUS at 22:32

## 2020-01-09 RX ADMIN — SODIUM CHLORIDE 1000 ML: 9 INJECTION, SOLUTION INTRAVENOUS at 21:33

## 2020-01-09 ASSESSMENT — PAIN DESCRIPTION - LOCATION: LOCATION: GROIN

## 2020-01-09 ASSESSMENT — PAIN DESCRIPTION - ONSET: ONSET: ON-GOING

## 2020-01-09 ASSESSMENT — PAIN DESCRIPTION - PAIN TYPE: TYPE: ACUTE PAIN

## 2020-01-09 ASSESSMENT — PAIN DESCRIPTION - DESCRIPTORS: DESCRIPTORS: STABBING;SHOOTING;THROBBING

## 2020-01-09 ASSESSMENT — PAIN SCALES - GENERAL
PAINLEVEL_OUTOF10: 8
PAINLEVEL_OUTOF10: 5

## 2020-01-09 ASSESSMENT — PAIN DESCRIPTION - ORIENTATION: ORIENTATION: RIGHT

## 2020-01-09 ASSESSMENT — PAIN DESCRIPTION - PROGRESSION: CLINICAL_PROGRESSION: NOT CHANGED

## 2020-01-09 ASSESSMENT — PAIN DESCRIPTION - FREQUENCY: FREQUENCY: CONTINUOUS

## 2020-01-10 VITALS
HEART RATE: 95 BPM | SYSTOLIC BLOOD PRESSURE: 117 MMHG | BODY MASS INDEX: 37.86 KG/M2 | OXYGEN SATURATION: 96 % | WEIGHT: 295 LBS | HEIGHT: 74 IN | DIASTOLIC BLOOD PRESSURE: 70 MMHG | RESPIRATION RATE: 16 BRPM | TEMPERATURE: 97 F

## 2020-01-10 LAB
BILIRUBIN URINE: NEGATIVE
BLOOD, URINE: NEGATIVE
CLARITY: CLEAR
COLOR: YELLOW
EKG ATRIAL RATE: 122 BPM
EKG P AXIS: 69 DEGREES
EKG P-R INTERVAL: 154 MS
EKG Q-T INTERVAL: 304 MS
EKG QRS DURATION: 94 MS
EKG QTC CALCULATION (BAZETT): 433 MS
EKG R AXIS: 64 DEGREES
EKG T AXIS: 59 DEGREES
EKG VENTRICULAR RATE: 122 BPM
GLUCOSE URINE: NEGATIVE MG/DL
KETONES, URINE: NEGATIVE MG/DL
LEUKOCYTE ESTERASE, URINE: NEGATIVE
NITRITE, URINE: NEGATIVE
PH UA: 5.5 (ref 5–9)
PROTEIN UA: NEGATIVE MG/DL
SPECIFIC GRAVITY UA: 1.02 (ref 1–1.03)
UROBILINOGEN, URINE: 0.2 E.U./DL

## 2020-01-10 PROCEDURE — 81003 URINALYSIS AUTO W/O SCOPE: CPT

## 2020-01-10 PROCEDURE — 93010 ELECTROCARDIOGRAM REPORT: CPT | Performed by: INTERNAL MEDICINE

## 2020-01-10 RX ORDER — DOXYCYCLINE HYCLATE 100 MG
100 TABLET ORAL 2 TIMES DAILY
Qty: 20 TABLET | Refills: 0 | Status: SHIPPED | OUTPATIENT
Start: 2020-01-10 | End: 2020-01-20

## 2020-01-10 RX ORDER — HYDROCODONE BITARTRATE AND ACETAMINOPHEN 5; 325 MG/1; MG/1
1 TABLET ORAL EVERY 4 HOURS PRN
Qty: 18 TABLET | Refills: 0 | Status: SHIPPED | OUTPATIENT
Start: 2020-01-10 | End: 2020-01-13

## 2020-01-10 ASSESSMENT — PAIN DESCRIPTION - PROGRESSION
CLINICAL_PROGRESSION: GRADUALLY IMPROVING
CLINICAL_PROGRESSION: GRADUALLY IMPROVING

## 2020-01-10 ASSESSMENT — PAIN SCALES - GENERAL: PAINLEVEL_OUTOF10: 6

## 2020-01-10 NOTE — ED PROVIDER NOTES
10/31/2016); Foot surgery (Right, 1985); pr colonoscopy flx dx w/collj spec when pfrmd (N/A, 5/7/2018); and hernia repair (Right, 12/9/2019). Social History:  reports that he has never smoked. His smokeless tobacco use includes chew. He reports previous alcohol use. He reports that he does not use drugs. Family History: family history includes Arthritis in his father; Cancer in his maternal grandfather and paternal uncle; Diabetes in his father and paternal grandfather; Heart Disease in his maternal grandmother; Hypertension in his mother; Stroke in his maternal aunt. The patients home medications have been reviewed. Allergies: Seasonal and Tramadol    ---------------------------------------------------PHYSICAL EXAM--------------------------------------    Constitutional/General: Alert and oriented x3, well appearing, non toxic in NAD  Head: Normocephalic and atraumatic  Eyes: PERRL, EOMI, conjunctiva normal, sclera non icteric  Mouth: Oropharynx clear  Neck: Supple  Respiratory: Lungs clear to auscultation bilaterally, no wheezes, rales, or rhonchi. Not in respiratory distress  Cardiovascular:  Regular rate. Regular rhythm. No murmurs, gallops, or rubs. 2+ distal pulses  Chest: No chest wall tenderness  GI:  Abdomen Soft, Right inguinal tenderness to palpation with a bulge at the site of hernia repair. Incision clean and intact, healing normally. Non distended. +BS. No rebound, guarding, or rigidity. No pulsatile masses. Musculoskeletal: Moves all extremities x 4. Warm and well perfused, no clubbing, cyanosis, or edema. Integument: Skin warm and dry. No rashes. Neurologic: GCS 15, no focal deficits, symmetric strength 5/5 in the upper and lower extremities bilaterally  Psychiatric: Normal Affect    -------------------------------------------------- RESULTS -------------------------------------------------  I have personally reviewed all laboratory and imaging results for this patient.  Results are need further evaluation. Pt is agreeable to plan and all questions have been answered at this time. This patient's ED course included: a personal history and physicial examination, re-evaluation prior to disposition and multiple bedside re-evaluations    This patient has remained hemodynamically stable and been closely monitored during their ED course. Re-Evaluations:           Re-evaluation. Patients symptoms are improving    Consultations:             None. Counseling: The emergency provider has spoken with the patient and discussed todays results, in addition to providing specific details for the plan of care and counseling regarding the diagnosis and prognosis. Questions are answered at this time and they are agreeable with the plan.     --------------------------------- IMPRESSION AND DISPOSITION ---------------------------------    IMPRESSION  1. Abdominal pain, unspecified abdominal location        DISPOSITION  Disposition: Discharge to home  Patient condition is good    1/9/20, 9:06 PM.    This note is prepared by Fausto Liz, acting as Scribe for Ulysses Valentin DO. Ulysses Valentin DO:  The scribe's documentation has been prepared under my direction and personally reviewed by me in its entirety. I confirm that the note above accurately reflects all work, treatment, procedures, and medical decision making performed by me.          Ulysses Valentin DO  01/10/20 [id number]

## 2020-01-22 ENCOUNTER — OFFICE VISIT (OUTPATIENT)
Dept: FAMILY MEDICINE CLINIC | Age: 45
End: 2020-01-22
Payer: COMMERCIAL

## 2020-01-22 VITALS
TEMPERATURE: 98.1 F | DIASTOLIC BLOOD PRESSURE: 67 MMHG | BODY MASS INDEX: 37.88 KG/M2 | HEART RATE: 104 BPM | OXYGEN SATURATION: 98 % | RESPIRATION RATE: 16 BRPM | SYSTOLIC BLOOD PRESSURE: 109 MMHG | HEIGHT: 74 IN

## 2020-01-22 LAB — HBA1C MFR BLD: 8.6 %

## 2020-01-22 PROCEDURE — G8417 CALC BMI ABV UP PARAM F/U: HCPCS | Performed by: NURSE PRACTITIONER

## 2020-01-22 PROCEDURE — 99214 OFFICE O/P EST MOD 30 MIN: CPT | Performed by: NURSE PRACTITIONER

## 2020-01-22 PROCEDURE — 96372 THER/PROPH/DIAG INJ SC/IM: CPT | Performed by: NURSE PRACTITIONER

## 2020-01-22 PROCEDURE — 4004F PT TOBACCO SCREEN RCVD TLK: CPT | Performed by: NURSE PRACTITIONER

## 2020-01-22 PROCEDURE — G8427 DOCREV CUR MEDS BY ELIG CLIN: HCPCS | Performed by: NURSE PRACTITIONER

## 2020-01-22 PROCEDURE — 2022F DILAT RTA XM EVC RTNOPTHY: CPT | Performed by: NURSE PRACTITIONER

## 2020-01-22 PROCEDURE — G8484 FLU IMMUNIZE NO ADMIN: HCPCS | Performed by: NURSE PRACTITIONER

## 2020-01-22 PROCEDURE — 83036 HEMOGLOBIN GLYCOSYLATED A1C: CPT | Performed by: NURSE PRACTITIONER

## 2020-01-22 RX ORDER — ZOLPIDEM TARTRATE 10 MG/1
10 TABLET ORAL NIGHTLY PRN
Qty: 15 TABLET | Refills: 0 | Status: SHIPPED | OUTPATIENT
Start: 2020-01-22 | End: 2020-02-21

## 2020-01-22 RX ORDER — ZOLPIDEM TARTRATE 10 MG/1
10 TABLET ORAL NIGHTLY PRN
Status: CANCELLED | OUTPATIENT
Start: 2020-01-22

## 2020-01-22 RX ORDER — IBUPROFEN 600 MG/1
600 TABLET ORAL 4 TIMES DAILY PRN
Qty: 60 TABLET | Refills: 0 | Status: SHIPPED | OUTPATIENT
Start: 2020-01-22 | End: 2020-02-04

## 2020-01-22 RX ORDER — DEXAMETHASONE SODIUM PHOSPHATE 10 MG/ML
10 INJECTION INTRAMUSCULAR; INTRAVENOUS ONCE
Status: COMPLETED | OUTPATIENT
Start: 2020-01-22 | End: 2020-01-22

## 2020-01-22 RX ADMIN — DEXAMETHASONE SODIUM PHOSPHATE 10 MG: 10 INJECTION INTRAMUSCULAR; INTRAVENOUS at 16:14

## 2020-01-22 ASSESSMENT — ENCOUNTER SYMPTOMS
SHORTNESS OF BREATH: 0
APNEA: 0
RECTAL PAIN: 0
COLOR CHANGE: 0
SINUS PRESSURE: 0
ABDOMINAL DISTENTION: 0
EYE PAIN: 0
PHOTOPHOBIA: 0
EYE REDNESS: 0
ANAL BLEEDING: 0
TROUBLE SWALLOWING: 0
SORE THROAT: 0
CHEST TIGHTNESS: 0
NAUSEA: 0
WHEEZING: 0
SINUS PAIN: 0
STRIDOR: 0
EYE DISCHARGE: 0
BLOOD IN STOOL: 0
EYE ITCHING: 0
CHOKING: 0
VOICE CHANGE: 0
FACIAL SWELLING: 0
CONSTIPATION: 0
VOMITING: 0
RHINORRHEA: 0
ABDOMINAL PAIN: 0
DIARRHEA: 0
COUGH: 0

## 2020-01-22 ASSESSMENT — PATIENT HEALTH QUESTIONNAIRE - PHQ9
1. LITTLE INTEREST OR PLEASURE IN DOING THINGS: 0
SUM OF ALL RESPONSES TO PHQ9 QUESTIONS 1 & 2: 0
SUM OF ALL RESPONSES TO PHQ QUESTIONS 1-9: 0
2. FEELING DOWN, DEPRESSED OR HOPELESS: 0
SUM OF ALL RESPONSES TO PHQ QUESTIONS 1-9: 0

## 2020-01-22 NOTE — PROGRESS NOTES
Breath sounds: Normal breath sounds. No stridor. No wheezing, rhonchi or rales. Chest:      Chest wall: No tenderness. Abdominal:      General: Bowel sounds are normal. There is no distension. Palpations: Abdomen is soft. There is no mass. Tenderness: There is no abdominal tenderness. There is no guarding or rebound. Hernia: No hernia is present. Musculoskeletal:      Right foot: Normal range of motion. No deformity. Left foot: Normal range of motion. No deformity. Comments: Mild/moderate lumbar spine and paraspinal muscle tenderness bilaterally. No erythema/edema or deformities present, no contusions present. Slow in changing positions from sitting to standing, Decreased ROM, walks with cane       Feet:      Right foot:      Protective Sensation: 5 sites tested. 5 sites sensed. Skin integrity: Callus and dry skin present. No ulcer, blister, skin breakdown, erythema or warmth. Left foot:      Protective Sensation: 5 sites tested. 5 sites sensed. Skin integrity: Callus and dry skin present. No ulcer, blister, skin breakdown, erythema or warmth. Lymphadenopathy:      Cervical: No cervical adenopathy. Skin:     General: Skin is warm and dry. Coloration: Skin is not jaundiced or pale. Findings: No erythema or rash. Neurological:      Mental Status: He is alert and oriented to person, place, and time. Sensory: No sensory deficit. Coordination: Coordination normal.      Gait: Gait normal.      Deep Tendon Reflexes: Reflexes normal.   Psychiatric:         Mood and Affect: Mood normal.         Behavior: Behavior normal.         Thought Content: Thought content normal.         Judgment: Judgment normal.         Assessment / Plan:      Judy Gilbert was seen today for diabetes, hypertension and hyperlipidemia.     Diagnoses and all orders for this visit:    Type 2 diabetes mellitus without complication, without long-term current use of insulin ([address]  Advised read all Rx info from pharmacy to assure aware of all possible risks and side effects of medication before taking. Reviewed age and gender appropriate health screening exams and vaccinations. Advised patient regarding importance of keeping up with recommended health maintenance and to schedule as soon as possible if overdue, as this is important in assessing for undiagnosed pathology, especially cancer, as well as protecting against potentially harmful/life threatening disease. Patient and/or guardian verbalizes understanding and agrees with above counseling, assessment and plan. All questions answered.     Ovidio Esqueda, APRN - CNP

## 2020-04-16 RX ORDER — FENOFIBRATE 54 MG/1
TABLET ORAL
Qty: 90 TABLET | Refills: 2 | OUTPATIENT
Start: 2020-04-16

## 2020-04-22 RX ORDER — FLUTICASONE PROPIONATE 50 MCG
SPRAY, SUSPENSION (ML) NASAL
Qty: 16 G | Refills: 0 | Status: SHIPPED
Start: 2020-04-22 | End: 2020-05-14 | Stop reason: SDUPTHER

## 2020-04-29 RX ORDER — IBUPROFEN 600 MG/1
TABLET ORAL
Qty: 60 TABLET | Refills: 5 | Status: SHIPPED
Start: 2020-04-29 | End: 2020-05-11

## 2020-05-11 ENCOUNTER — APPOINTMENT (OUTPATIENT)
Dept: CT IMAGING | Age: 45
End: 2020-05-11

## 2020-05-11 ENCOUNTER — APPOINTMENT (OUTPATIENT)
Dept: GENERAL RADIOLOGY | Age: 45
End: 2020-05-11

## 2020-05-11 ENCOUNTER — HOSPITAL ENCOUNTER (EMERGENCY)
Age: 45
Discharge: HOME | End: 2020-05-11
Attending: EMERGENCY MEDICINE
Payer: COMMERCIAL

## 2020-05-11 VITALS
TEMPERATURE: 98 F | OXYGEN SATURATION: 98 % | BODY MASS INDEX: 37.88 KG/M2 | SYSTOLIC BLOOD PRESSURE: 142 MMHG | DIASTOLIC BLOOD PRESSURE: 90 MMHG | RESPIRATION RATE: 12 BRPM | HEART RATE: 102 BPM | WEIGHT: 295 LBS

## 2020-05-11 LAB
ALBUMIN SERPL-MCNC: 4.4 G/DL (ref 3.5–5.2)
ALP BLD-CCNC: 134 U/L (ref 40–129)
ALT SERPL-CCNC: 20 U/L (ref 0–40)
ANION GAP SERPL CALCULATED.3IONS-SCNC: 16 MMOL/L (ref 7–16)
AST SERPL-CCNC: 16 U/L (ref 0–39)
BASOPHILS ABSOLUTE: 0.06 E9/L (ref 0–0.2)
BASOPHILS RELATIVE PERCENT: 0.6 % (ref 0–2)
BILIRUB SERPL-MCNC: 0.2 MG/DL (ref 0–1.2)
BILIRUBIN URINE: NEGATIVE
BLOOD, URINE: NEGATIVE
BUN BLDV-MCNC: 21 MG/DL (ref 6–20)
CALCIUM SERPL-MCNC: 10.5 MG/DL (ref 8.6–10.2)
CHLORIDE BLD-SCNC: 99 MMOL/L (ref 98–107)
CLARITY: CLEAR
CO2: 23 MMOL/L (ref 22–29)
COLOR: YELLOW
CREAT SERPL-MCNC: 1 MG/DL (ref 0.7–1.2)
EOSINOPHILS ABSOLUTE: 0.37 E9/L (ref 0.05–0.5)
EOSINOPHILS RELATIVE PERCENT: 3.7 % (ref 0–6)
GFR AFRICAN AMERICAN: >60
GFR NON-AFRICAN AMERICAN: >60 ML/MIN/1.73
GLUCOSE BLD-MCNC: 196 MG/DL (ref 74–99)
GLUCOSE URINE: NEGATIVE MG/DL
HCT VFR BLD CALC: 42.5 % (ref 37–54)
HEMOGLOBIN: 14.4 G/DL (ref 12.5–16.5)
IMMATURE GRANULOCYTES #: 0.06 E9/L
IMMATURE GRANULOCYTES %: 0.6 % (ref 0–5)
KETONES, URINE: NEGATIVE MG/DL
LACTIC ACID: 1.9 MMOL/L (ref 0.5–2.2)
LACTIC ACID: 2.6 MMOL/L (ref 0.5–2.2)
LEUKOCYTE ESTERASE, URINE: NEGATIVE
LYMPHOCYTES ABSOLUTE: 1.76 E9/L (ref 1.5–4)
LYMPHOCYTES RELATIVE PERCENT: 17.6 % (ref 20–42)
MCH RBC QN AUTO: 28.5 PG (ref 26–35)
MCHC RBC AUTO-ENTMCNC: 33.9 % (ref 32–34.5)
MCV RBC AUTO: 84.2 FL (ref 80–99.9)
MONOCYTES ABSOLUTE: 0.72 E9/L (ref 0.1–0.95)
MONOCYTES RELATIVE PERCENT: 7.2 % (ref 2–12)
NEUTROPHILS ABSOLUTE: 7.03 E9/L (ref 1.8–7.3)
NEUTROPHILS RELATIVE PERCENT: 70.3 % (ref 43–80)
NITRITE, URINE: NEGATIVE
PDW BLD-RTO: 12.6 FL (ref 11.5–15)
PH UA: 5 (ref 5–9)
PLATELET # BLD: 248 E9/L (ref 130–450)
PMV BLD AUTO: 10.1 FL (ref 7–12)
POTASSIUM SERPL-SCNC: 5.4 MMOL/L (ref 3.5–5)
PROTEIN UA: NEGATIVE MG/DL
RBC # BLD: 5.05 E12/L (ref 3.8–5.8)
SODIUM BLD-SCNC: 138 MMOL/L (ref 132–146)
SPECIFIC GRAVITY UA: 1.01 (ref 1–1.03)
TOTAL PROTEIN: 7 G/DL (ref 6.4–8.3)
UROBILINOGEN, URINE: 0.2 E.U./DL
WBC # BLD: 10 E9/L (ref 4.5–11.5)

## 2020-05-11 PROCEDURE — 2580000003 HC RX 258: Performed by: EMERGENCY MEDICINE

## 2020-05-11 PROCEDURE — 93005 ELECTROCARDIOGRAM TRACING: CPT | Performed by: EMERGENCY MEDICINE

## 2020-05-11 PROCEDURE — 74177 CT ABD & PELVIS W/CONTRAST: CPT

## 2020-05-11 PROCEDURE — 83605 ASSAY OF LACTIC ACID: CPT

## 2020-05-11 PROCEDURE — 6360000004 HC RX CONTRAST MEDICATION: Performed by: RADIOLOGY

## 2020-05-11 PROCEDURE — 73030 X-RAY EXAM OF SHOULDER: CPT

## 2020-05-11 PROCEDURE — 6360000002 HC RX W HCPCS: Performed by: EMERGENCY MEDICINE

## 2020-05-11 PROCEDURE — 99284 EMERGENCY DEPT VISIT MOD MDM: CPT

## 2020-05-11 PROCEDURE — 85025 COMPLETE CBC W/AUTO DIFF WBC: CPT

## 2020-05-11 PROCEDURE — 96374 THER/PROPH/DIAG INJ IV PUSH: CPT

## 2020-05-11 PROCEDURE — 6370000000 HC RX 637 (ALT 250 FOR IP): Performed by: EMERGENCY MEDICINE

## 2020-05-11 PROCEDURE — 96375 TX/PRO/DX INJ NEW DRUG ADDON: CPT

## 2020-05-11 PROCEDURE — 80053 COMPREHEN METABOLIC PANEL: CPT

## 2020-05-11 PROCEDURE — 2580000003 HC RX 258: Performed by: RADIOLOGY

## 2020-05-11 PROCEDURE — 71045 X-RAY EXAM CHEST 1 VIEW: CPT

## 2020-05-11 PROCEDURE — 81003 URINALYSIS AUTO W/O SCOPE: CPT

## 2020-05-11 RX ORDER — KETOROLAC TROMETHAMINE 30 MG/ML
15 INJECTION, SOLUTION INTRAMUSCULAR; INTRAVENOUS ONCE
Status: COMPLETED | OUTPATIENT
Start: 2020-05-11 | End: 2020-05-11

## 2020-05-11 RX ORDER — HYDROCODONE BITARTRATE AND ACETAMINOPHEN 5; 325 MG/1; MG/1
1 TABLET ORAL EVERY 6 HOURS PRN
Qty: 8 TABLET | Refills: 0 | Status: SHIPPED | OUTPATIENT
Start: 2020-05-11 | End: 2020-05-14 | Stop reason: SDUPTHER

## 2020-05-11 RX ORDER — DOXYCYCLINE HYCLATE 100 MG/1
100 CAPSULE ORAL ONCE
Status: COMPLETED | OUTPATIENT
Start: 2020-05-11 | End: 2020-05-11

## 2020-05-11 RX ORDER — DOXYCYCLINE HYCLATE 100 MG
100 TABLET ORAL 2 TIMES DAILY
Qty: 20 TABLET | Refills: 0 | Status: SHIPPED | OUTPATIENT
Start: 2020-05-11 | End: 2020-05-21

## 2020-05-11 RX ORDER — 0.9 % SODIUM CHLORIDE 0.9 %
1000 INTRAVENOUS SOLUTION INTRAVENOUS ONCE
Status: COMPLETED | OUTPATIENT
Start: 2020-05-11 | End: 2020-05-11

## 2020-05-11 RX ORDER — IBUPROFEN 600 MG/1
TABLET ORAL
Qty: 60 TABLET | Refills: 5 | Status: SHIPPED
Start: 2020-05-11 | End: 2020-09-03

## 2020-05-11 RX ORDER — SODIUM CHLORIDE 0.9 % (FLUSH) 0.9 %
10 SYRINGE (ML) INJECTION PRN
Status: DISCONTINUED | OUTPATIENT
Start: 2020-05-11 | End: 2020-05-11 | Stop reason: HOSPADM

## 2020-05-11 RX ORDER — FENTANYL CITRATE 50 UG/ML
50 INJECTION, SOLUTION INTRAMUSCULAR; INTRAVENOUS ONCE
Status: COMPLETED | OUTPATIENT
Start: 2020-05-11 | End: 2020-05-11

## 2020-05-11 RX ORDER — SODIUM CHLORIDE 9 MG/ML
INJECTION, SOLUTION INTRAVENOUS CONTINUOUS
Status: DISCONTINUED | OUTPATIENT
Start: 2020-05-11 | End: 2020-05-11 | Stop reason: HOSPADM

## 2020-05-11 RX ADMIN — IOPAMIDOL 110 ML: 755 INJECTION, SOLUTION INTRAVENOUS at 16:39

## 2020-05-11 RX ADMIN — FENTANYL CITRATE 50 MCG: 50 INJECTION, SOLUTION INTRAMUSCULAR; INTRAVENOUS at 16:12

## 2020-05-11 RX ADMIN — DOXYCYCLINE HYCLATE 100 MG: 100 CAPSULE ORAL at 19:50

## 2020-05-11 RX ADMIN — KETOROLAC TROMETHAMINE 15 MG: 30 INJECTION, SOLUTION INTRAMUSCULAR; INTRAVENOUS at 18:39

## 2020-05-11 RX ADMIN — Medication 10 ML: at 16:39

## 2020-05-11 RX ADMIN — SODIUM CHLORIDE 1000 ML: 9 INJECTION, SOLUTION INTRAVENOUS at 16:13

## 2020-05-11 ASSESSMENT — PAIN DESCRIPTION - PAIN TYPE: TYPE: ACUTE PAIN

## 2020-05-11 ASSESSMENT — PAIN SCALES - GENERAL
PAINLEVEL_OUTOF10: 6
PAINLEVEL_OUTOF10: 10
PAINLEVEL_OUTOF10: 9

## 2020-05-11 ASSESSMENT — PAIN DESCRIPTION - LOCATION: LOCATION: ABDOMEN

## 2020-05-11 NOTE — ED PROVIDER NOTES
Department of Emergency Medicine   ED  Provider Note  Admit Date/RoomTime: 5/11/2020  2:42 PM  [facility]          History of Present Illness:  5/11/20, Time: 6:40 PM EDT  Chief Complaint   Patient presents with    Abdominal Pain     RLQ starting 2 weeks ago, worsening in last few days    Shoulder Pain     fell 2 months ago and still has left shoulder pain                Kiley Gonzalez is a 53 y.o. male presenting to the ED for abdominal pain. Patient has had right lower quadrant abdominal pain for the past 2 weeks. Describes aching sensation, nothing makes it better or worse, does not rate anywhere else. He has not had this before. Does a history of previous hernia repair. He also complains of left shoulder pain, says he fell 2 months ago, the pain is persistent. He still able to use that arm, he still range of motion in that shoulder, as well as limited due to pain. Denies any fever, chills, back pain, nausea, vomiting, chest pain, shortness of breath, or any other symptoms. Review of Systems:   Pertinent positives and negatives are stated within HPI, all other systems reviewed and are negative.        --------------------------------------------- PAST HISTORY ---------------------------------------------  Past Medical History:  has a past medical history of Accident, SIMÓN (acute kidney injury) ([facility] Allergic rhinitis, Chronic back pain, Depression, Diabetes mellitus ([facility] [facility] Difficulty sleeping, Displacement of lumbar intervertebral disc without myelopathy, Fibromyalgia, Fractured rib, H/O seasonal allergies, Head injury, Hyperlipidemia, Hypertension, Obesity (BMI 35.0-39.9 without comorbidity), Osteoarthritis, and Thoracic or lumbosacral neuritis or radiculitis, unspecified. Past Surgical History:  has a past surgical history that includes Neck surgery (2000); shoulder surgery (2001 &2007); Wrist surgery (2007); Rotator cuff repair (Left, 2014); hernia repair (2001);  Shoulder arthroscopy (Left, 11 21 14); Vasectomy; Hip Arthroplasty (Left, 4/11/2016); Total hip arthroplasty (Right, 10/31/2016); Foot surgery (Right, 1985); pr colonoscopy flx dx w/collj spec when pfrmd (N/A, 5/7/2018); and hernia repair (Right, 12/9/2019). Social History:  reports that he has never smoked. His smokeless tobacco use includes chew. He reports previous alcohol use. He reports that he does not use drugs. Family History: family history includes Arthritis in his father; Cancer in his maternal grandfather and paternal uncle; Diabetes in his father and paternal grandfather; Heart Disease in his maternal grandmother; Hypertension in his mother; Stroke in his maternal aunt. . Unless otherwise noted, family history is non contributory    The patients home medications have been reviewed. Allergies: Seasonal and Tramadol        ---------------------------------------------------PHYSICAL EXAM--------------------------------------    Constitutional/General: Alert and oriented x3  Head: Normocephalic and atraumatic  Eyes: PERRL, EOMI, sclera non icteric  Mouth: Oropharynx clear, handling secretions, no trismus, no asymmetry of the posterior oropharynx or uvular edema  Neck: Supple, full ROM, no stridor, no meningeal signs  Respiratory: Lungs clear to auscultation bilaterally, no wheezes, rales, or rhonchi. Not in respiratory distress  Cardiovascular:  Regular rate. Regular rhythm. 2+ distal pulses. Equal extremity pulses. Chest: No chest wall tenderness  GI:  Abdomen Soft, with mild right lower quadrant tenderness, no guarding or rebound, bowel sounds normal  Musculoskeletal: Moves all extremities x 4. Warm and well perfused, no clubbing, cyanosis, or edema. Capillary refill <3 seconds. No gross signs of injury or trauma over the left shoulder, range of motion intact, although painful, radial pulse 2+ in the extremity  Integument: skin warm and dry. No rashes.    Neurologic: GCS 15, no focal deficits, symmetric strength 5/5 in the upper and lower extremities bilaterally  Psychiatric: Normal Affect          -------------------------------------------------- RESULTS -------------------------------------------------  I have personally reviewed all laboratory and imaging results for this patient. Results are listed below.      LABS: (Lab results interpreted by me)  Results for orders placed or performed during the hospital encounter of 05/11/20   CBC Auto Differential   Result Value Ref Range    WBC 10.0 4.5 - 11.5 E9/L    RBC 5.05 3.80 - 5.80 E12/L    Hemoglobin 14.4 12.5 - 16.5 g/dL    Hematocrit 42.5 37.0 - 54.0 %    MCV 84.2 80.0 - 99.9 fL    MCH 28.5 26.0 - 35.0 pg    MCHC 33.9 32.0 - 34.5 %    RDW 12.6 11.5 - 15.0 fL    Platelets 797 238 - 743 E9/L    MPV 10.1 7.0 - 12.0 fL    Neutrophils % 70.3 43.0 - 80.0 %    Immature Granulocytes % 0.6 0.0 - 5.0 %    Lymphocytes % 17.6 (L) 20.0 - 42.0 %    Monocytes % 7.2 2.0 - 12.0 %    Eosinophils % 3.7 0.0 - 6.0 %    Basophils % 0.6 0.0 - 2.0 %    Neutrophils Absolute 7.03 1.80 - 7.30 E9/L    Immature Granulocytes # 0.06 E9/L    Lymphocytes Absolute 1.76 1.50 - 4.00 E9/L    Monocytes Absolute 0.72 0.10 - 0.95 E9/L    Eosinophils Absolute 0.37 0.05 - 0.50 E9/L    Basophils Absolute 0.06 0.00 - 0.20 E9/L   Comprehensive Metabolic Panel   Result Value Ref Range    Sodium 138 132 - 146 mmol/L    Potassium 5.4 (H) 3.5 - 5.0 mmol/L    Chloride 99 98 - 107 mmol/L    CO2 23 22 - 29 mmol/L    Anion Gap 16 7 - 16 mmol/L    Glucose 196 (H) 74 - 99 mg/dL    BUN 21 (H) 6 - 20 mg/dL    CREATININE 1.0 0.7 - 1.2 mg/dL    GFR Non-African American >60 >=60 mL/min/1.73    GFR African American >60     Calcium 10.5 (H) 8.6 - 10.2 mg/dL    Total Protein 7.0 6.4 - 8.3 g/dL    Alb 4.4 3.5 - 5.2 g/dL    Total Bilirubin 0.2 0.0 - 1.2 mg/dL    Alkaline Phosphatase 134 (H) 40 - 129 U/L    ALT 20 0 - 40 U/L    AST 16 0 - 39 U/L   Lactic Acid, Plasma   Result Value Ref Range    Lactic Acid 2.6 (H) 0.5 - 2.2 mmol/L   Urinalysis   Result Value Ref Range    Color, UA Yellow Straw/Yellow    Clarity, UA Clear Clear    Glucose, Ur Negative Negative mg/dL    Bilirubin Urine Negative Negative    Ketones, Urine Negative Negative mg/dL    Specific Gravity, UA 1.015 1.005 - 1.030    Blood, Urine Negative Negative    pH, UA 5.0 5.0 - 9.0    Protein, UA Negative Negative mg/dL    Urobilinogen, Urine 0.2 <2.0 E.U./dL    Nitrite, Urine Negative Negative    Leukocyte Esterase, Urine Negative Negative   Lactic Acid, Plasma   Result Value Ref Range    Lactic Acid 1.9 0.5 - 2.2 mmol/L   EKG 12 Lead   Result Value Ref Range    Ventricular Rate 119 BPM    Atrial Rate 119 BPM    P-R Interval 154 ms    QRS Duration 92 ms    Q-T Interval 316 ms    QTc Calculation (Bazett) 444 ms    P Axis 60 degrees    R Axis 65 degrees    T Axis 26 degrees   ,       RADIOLOGY:  Interpreted by Radiologist unless otherwise specified  XR SHOULDER LEFT (MIN 2 VIEWS)   Final Result   Narrowing of the acromial-humeral interspace suggests rotator cuff   injury or atrophy, which is previously documented by MRI in October 2014. No evidence of fracture or misalignment otherwise. CT ABDOMEN PELVIS W IV CONTRAST Additional Contrast? None   Final Result      On the prior study, there was a discussion about the possibility of   cellulitis involving the right inguinal canal. Stranding and haziness   in this region are unchanged. Bilateral hip replacement prostheses. XR CHEST PORTABLE   Final Result   No acute cardiopulmonary pathology.             EKG Interpretation  Interpreted by emergency department physician, Dr. Castillo Angel, rate 119, no STEMI        ------------------------- NURSING NOTES AND VITALS REVIEWED ---------------------------   The nursing notes within the ED encounter and vital signs as below have been reviewed by myself  BP (!) 142/90   Pulse 102   Temp 98 °F (36.7 °C)   Resp 12   Wt 295 lb (133.8 kg)   SpO2 98%

## 2020-05-12 ENCOUNTER — TELEPHONE (OUTPATIENT)
Dept: ADMINISTRATIVE | Age: 45
End: 2020-05-12

## 2020-05-12 LAB
EKG ATRIAL RATE: 119 BPM
EKG P AXIS: 60 DEGREES
EKG P-R INTERVAL: 154 MS
EKG Q-T INTERVAL: 316 MS
EKG QRS DURATION: 92 MS
EKG QTC CALCULATION (BAZETT): 444 MS
EKG R AXIS: 65 DEGREES
EKG T AXIS: 26 DEGREES
EKG VENTRICULAR RATE: 119 BPM

## 2020-05-12 PROCEDURE — 93010 ELECTROCARDIOGRAM REPORT: CPT | Performed by: INTERNAL MEDICINE

## 2020-05-14 ENCOUNTER — OFFICE VISIT (OUTPATIENT)
Dept: FAMILY MEDICINE CLINIC | Age: 45
End: 2020-05-14
Payer: COMMERCIAL

## 2020-05-14 ENCOUNTER — TELEPHONE (OUTPATIENT)
Dept: FAMILY MEDICINE CLINIC | Age: 45
End: 2020-05-14

## 2020-05-14 VITALS
HEIGHT: 74 IN | TEMPERATURE: 97.8 F | DIASTOLIC BLOOD PRESSURE: 72 MMHG | BODY MASS INDEX: 40.43 KG/M2 | OXYGEN SATURATION: 95 % | RESPIRATION RATE: 20 BRPM | SYSTOLIC BLOOD PRESSURE: 108 MMHG | HEART RATE: 116 BPM | WEIGHT: 315 LBS

## 2020-05-14 PROCEDURE — 3046F HEMOGLOBIN A1C LEVEL >9.0%: CPT | Performed by: NURSE PRACTITIONER

## 2020-05-14 PROCEDURE — 4004F PT TOBACCO SCREEN RCVD TLK: CPT | Performed by: NURSE PRACTITIONER

## 2020-05-14 PROCEDURE — 2022F DILAT RTA XM EVC RTNOPTHY: CPT | Performed by: NURSE PRACTITIONER

## 2020-05-14 PROCEDURE — G8417 CALC BMI ABV UP PARAM F/U: HCPCS | Performed by: NURSE PRACTITIONER

## 2020-05-14 PROCEDURE — G8427 DOCREV CUR MEDS BY ELIG CLIN: HCPCS | Performed by: NURSE PRACTITIONER

## 2020-05-14 PROCEDURE — 99215 OFFICE O/P EST HI 40 MIN: CPT | Performed by: NURSE PRACTITIONER

## 2020-05-14 PROCEDURE — 83036 HEMOGLOBIN GLYCOSYLATED A1C: CPT | Performed by: NURSE PRACTITIONER

## 2020-05-14 RX ORDER — ASPIRIN 81 MG/1
TABLET ORAL
Qty: 90 TABLET | Refills: 1 | Status: SHIPPED
Start: 2020-05-14 | End: 2020-09-03

## 2020-05-14 RX ORDER — LISINOPRIL 20 MG/1
TABLET ORAL
Qty: 90 TABLET | Refills: 1 | Status: SHIPPED
Start: 2020-05-14 | End: 2020-09-03

## 2020-05-14 RX ORDER — DULOXETIN HYDROCHLORIDE 30 MG/1
30 CAPSULE, DELAYED RELEASE ORAL DAILY
Qty: 90 CAPSULE | Refills: 1 | Status: SHIPPED
Start: 2020-05-14 | End: 2021-10-08

## 2020-05-14 RX ORDER — FENOFIBRATE 54 MG/1
TABLET ORAL
Qty: 90 TABLET | Refills: 1 | Status: SHIPPED
Start: 2020-05-14 | End: 2020-09-03

## 2020-05-14 RX ORDER — PRAVASTATIN SODIUM 20 MG
20 TABLET ORAL NIGHTLY
Qty: 90 TABLET | Refills: 1 | Status: SHIPPED
Start: 2020-05-14 | End: 2021-09-20

## 2020-05-14 RX ORDER — PREGABALIN 300 MG/1
CAPSULE ORAL
Qty: 180 CAPSULE | Refills: 1 | Status: SHIPPED
Start: 2020-05-14 | End: 2020-09-03

## 2020-05-14 RX ORDER — HYDROCODONE BITARTRATE AND ACETAMINOPHEN 5; 325 MG/1; MG/1
1 TABLET ORAL EVERY 6 HOURS PRN
Qty: 28 TABLET | Refills: 0 | Status: SHIPPED | OUTPATIENT
Start: 2020-05-14 | End: 2020-05-21

## 2020-05-14 RX ORDER — FLASH GLUCOSE SENSOR
KIT MISCELLANEOUS
COMMUNITY
Start: 2019-07-22 | End: 2020-09-03

## 2020-05-14 RX ORDER — FLUTICASONE PROPIONATE 50 MCG
SPRAY, SUSPENSION (ML) NASAL
Qty: 16 G | Refills: 3 | Status: SHIPPED
Start: 2020-05-14 | End: 2020-09-03

## 2020-05-14 RX ORDER — CYCLOBENZAPRINE HCL 10 MG
TABLET ORAL
Qty: 270 TABLET | Refills: 1 | Status: SHIPPED
Start: 2020-05-14 | End: 2020-09-03

## 2020-05-14 ASSESSMENT — ENCOUNTER SYMPTOMS
SHORTNESS OF BREATH: 0
NAUSEA: 0
APNEA: 0
BLOOD IN STOOL: 0
EYE ITCHING: 0
TROUBLE SWALLOWING: 0
COUGH: 0
EYE PAIN: 0
VOMITING: 0
RHINORRHEA: 0
SINUS PRESSURE: 0
CHEST TIGHTNESS: 0
CHOKING: 0
FACIAL SWELLING: 0
DIARRHEA: 0
VOICE CHANGE: 0
PHOTOPHOBIA: 0
RECTAL PAIN: 0
ANAL BLEEDING: 0
EYE DISCHARGE: 0
WHEEZING: 0
SINUS PAIN: 0
ABDOMINAL DISTENTION: 0
CONSTIPATION: 0
COLOR CHANGE: 0
STRIDOR: 0
ABDOMINAL PAIN: 0
SORE THROAT: 0
EYE REDNESS: 0

## 2020-05-14 NOTE — PROGRESS NOTES
respiratory distress. Breath sounds: Normal breath sounds. No stridor. No wheezing, rhonchi or rales. Chest:      Chest wall: No tenderness. Abdominal:      General: Bowel sounds are normal. There is no distension. Palpations: Abdomen is soft. There is no mass. Tenderness: There is no abdominal tenderness. There is no guarding or rebound. Hernia: No hernia is present. Musculoskeletal:      Right foot: Normal range of motion. No deformity. Left foot: Normal range of motion. No deformity. Comments: Mild/moderate lumbar spine and paraspinal muscle tenderness bilaterally. No erythema/edema or deformities present, no contusions present. Slow in changing positions from sitting to standing, Decreased ROM, walks with cane    Moderate pain present w/light palpation to left AC/Bicep/Deltoid regions, decreased ROM w/abduction/adduction, no erythema/edema or deformities present       Feet:      Right foot:      Protective Sensation: 5 sites tested. 5 sites sensed. Skin integrity: Callus and dry skin present. No ulcer, blister, skin breakdown, erythema or warmth. Left foot:      Protective Sensation: 5 sites tested. 5 sites sensed. Skin integrity: Callus and dry skin present. No ulcer, blister, skin breakdown, erythema or warmth. Lymphadenopathy:      Cervical: No cervical adenopathy. Skin:     General: Skin is warm and dry. Coloration: Skin is not jaundiced or pale. Findings: No erythema or rash. Neurological:      Mental Status: He is alert and oriented to person, place, and time. Sensory: No sensory deficit. Coordination: Coordination normal.      Gait: Gait normal.      Deep Tendon Reflexes: Reflexes normal.   Psychiatric:         Mood and Affect: Mood normal.         Behavior: Behavior normal.         Thought Content:  Thought content normal.         Judgment: Judgment normal.           Assessment / Plan:      Beckie Schmitz was seen today for diabetes, hypertension, hyperlipidemia, lower back pain and shoulder pain. Diagnoses and all orders for this visit:    Type 2 diabetes mellitus without complication, without long-term current use of insulin (HCC)  Advised goal A1C is 7.0%  Advised on need to follow diet  Advised if A1C does not improve in 3 months will need to adjust medications  -     POCT glycosylated hemoglobin (Hb A1C)  -     metFORMIN (GLUCOPHAGE) 1000 MG tablet; take 1 tablet by mouth twice a day with meals  -     aspirin (SM ASPIRIN ADULT LOW STRENGTH) 81 MG EC tablet; take 1 tablet by mouth once daily  -     Lipid Panel; Future  -     TSH without Reflex; Future  -     Comprehensive Metabolic Panel; Future  -     CBC Auto Differential; Future    Essential hypertension-stable/controlled  Continue current treatment plan  -     lisinopril (PRINIVIL;ZESTRIL) 20 MG tablet; take 1 tablet by mouth once daily  -     Lipid Panel; Future  -     TSH without Reflex; Future  -     Comprehensive Metabolic Panel; Future  -     CBC Auto Differential; Future    Mixed hyperlipidemia  Continue Tricor and Pravachol  Advised on lifestyle modifications  -     fenofibrate (TRICOR) 54 MG tablet; take 1 tablet by mouth once daily  -     pravastatin (PRAVACHOL) 20 MG tablet; Take 1 tablet by mouth nightly  -     Lipid Panel; Future  -     TSH without Reflex; Future  -     Comprehensive Metabolic Panel; Future  -     CBC Auto Differential; Future    Lumbar radiculopathy  -     cyclobenzaprine (FLEXERIL) 10 MG tablet; take 1 tablet by mouth three times a day for if needed for muscle spasm  -     DULoxetine (CYMBALTA) 30 MG extended release capsule; Take 1 capsule by mouth daily    Neuropathic pain  -     cyclobenzaprine (FLEXERIL) 10 MG tablet; take 1 tablet by mouth three times a day for if needed for muscle spasm  -     pregabalin (LYRICA) 300 MG capsule; take 1 capsule by mouth twice a day  -     DULoxetine (CYMBALTA) 30 MG extended release capsule;  Take 1 capsule by mouth daily    Chronic left shoulder pain  Norco as ordered for pain  Advised on side effects  Advised to take Norco for severe pain only  Advised on OTC Motrin/Tyelnol for mild/moderate pain  -     Alycia - Sarbjit Gallegos MD, Orthopaedics and Rehabilitation, [facility] - [facility]  -     MRI Shoulder Left WO Contrast; Future  -     HYDROcodone-acetaminophen (NORCO) 5-325 MG per tablet; Take 1 tablet by mouth every 6 hours as needed for Pain for up to 7 days. Injury of left rotator cuff, subsequent encounter  As above  -     Yolanda Harvey MD, Orthopaedics and Rehabilitation, [facility] - [facility]  -     MRI Shoulder Left WO Contrast; Future  -     HYDROcodone-acetaminophen (NORCO) 5-325 MG per tablet; Take 1 tablet by mouth every 6 hours as needed for Pain for up to 7 days. Controlled Substance Monitoring:    Acute and Chronic Pain Monitoring:   RX Monitoring 5/14/2020   Attestation -   Acute Pain Prescriptions -   Periodic Controlled Substance Monitoring Possible medication side effects, risk of tolerance/dependence & alternative treatments discussed. ;No signs of potential drug abuse or diversion identified. ;Assessed functional status. Chronic Pain > 80 MEDD -       Call or go to ED immediately if symptoms worsen or persist.    Return in about 3 months (around 8/14/2020) for f/u DM/HTN/Hyperlipidemia. , or sooner if necessary. Educational materials and/or home exercises printed for patient's review and were included in patient instructions on his/her After Visit Summary and given to patient at the end of visit. Counseled regarding above diagnosis, including possible risks and complications,  especially if left uncontrolled. Counseled regarding the possible side effects, risks, benefits and alternatives to treatment; patient and/or guardian verbalizes understanding, agrees, feels comfortable with and wishes to proceed with above treatment plan.     Advised patient to call with any new medication issues, and read all Rx info from pharmacy to assure aware of all possible risks and side effects of medication before taking. Reviewed age and gender appropriate health screening exams and vaccinations. Advised patient regarding importance of keeping up with recommended health maintenance and to schedule as soon as possible if overdue, as this is important in assessing for undiagnosed pathology, especially cancer, as well as protecting against potentially harmful/life threatening disease. Patient and/or guardian verbalizes understanding and agrees with above counseling, assessment and plan. All questions answered.     Nicolasa Simmons, APRN - CNP

## 2020-05-15 LAB — HBA1C MFR BLD: 9.5 %

## 2020-05-15 NOTE — TELEPHONE ENCOUNTER
If pt needs medication now he will have to pay out of pocket, a lot of insurance companies are not covering [facility]

## 2020-05-19 NOTE — TELEPHONE ENCOUNTER
Patient would like a refill on his doxycycline 100 mg , stomach is still bothering him , please send to [facility]

## 2020-05-20 RX ORDER — DOXYCYCLINE HYCLATE 100 MG
100 TABLET ORAL 2 TIMES DAILY
Qty: 20 TABLET | Refills: 0 | OUTPATIENT
Start: 2020-05-20 | End: 2020-05-30

## 2020-05-20 RX ORDER — CIPROFLOXACIN 500 MG/1
500 TABLET, FILM COATED ORAL 2 TIMES DAILY
Qty: 20 TABLET | Refills: 0 | Status: SHIPPED | OUTPATIENT
Start: 2020-05-20 | End: 2020-05-30

## 2020-05-20 RX ORDER — METRONIDAZOLE 250 MG/1
250 TABLET ORAL 3 TIMES DAILY
Qty: 30 TABLET | Refills: 0 | Status: SHIPPED | OUTPATIENT
Start: 2020-05-20 | End: 2020-05-30

## 2020-05-28 ENCOUNTER — OFFICE VISIT (OUTPATIENT)
Dept: ORTHOPEDIC SURGERY | Age: 45
End: 2020-05-28
Payer: COMMERCIAL

## 2020-05-28 ENCOUNTER — HOSPITAL ENCOUNTER (OUTPATIENT)
Dept: GENERAL RADIOLOGY | Age: 45
Discharge: HOME | End: 2020-05-30

## 2020-05-28 VITALS
HEART RATE: 113 BPM | HEIGHT: 73 IN | WEIGHT: 315 LBS | DIASTOLIC BLOOD PRESSURE: 98 MMHG | SYSTOLIC BLOOD PRESSURE: 135 MMHG | BODY MASS INDEX: 41.75 KG/M2

## 2020-05-28 PROCEDURE — 4004F PT TOBACCO SCREEN RCVD TLK: CPT | Performed by: ORTHOPAEDIC SURGERY

## 2020-05-28 PROCEDURE — 73502 X-RAY EXAM HIP UNI 2-3 VIEWS: CPT

## 2020-05-28 PROCEDURE — G8417 CALC BMI ABV UP PARAM F/U: HCPCS | Performed by: ORTHOPAEDIC SURGERY

## 2020-05-28 PROCEDURE — G8427 DOCREV CUR MEDS BY ELIG CLIN: HCPCS | Performed by: ORTHOPAEDIC SURGERY

## 2020-05-28 PROCEDURE — 99212 OFFICE O/P EST SF 10 MIN: CPT

## 2020-05-28 PROCEDURE — 99213 OFFICE O/P EST LOW 20 MIN: CPT | Performed by: ORTHOPAEDIC SURGERY

## 2020-05-28 NOTE — PROGRESS NOTES
Alyssa Harrell is a 53 y.o. male    Patient Active Problem List    Diagnosis Date Noted    Rectus diastasis 11/01/2019    Recurrent unilateral inguinal hernia 11/01/2019    Cellulitis 07/02/2019    Cellulitis of foot 06/30/2019    Neuropathy 06/30/2019    Cellulitis, wound, post-operative 06/30/2019    Left leg swelling 06/30/2019    [facility]ic inflammatory response syndrome) [facility] 06/30/2019    Primary osteoarthritis of right hip 11/03/2016    Primary osteoarthritis of left hip 04/11/2016    Type 2 diabetes mellitus without complication ([facility] 58/16/9448    Lumbar disc herniation 02/22/2016    Lumbar radiculopathy 12/17/2015    Osteoarthritis of spine with radiculopathy, lumbar region 12/17/2015    Morbid obesity due to excess calories ([facility] 12/17/2015    Allergic rhinitis 11/23/2015    Essential hypertension 10/16/2015    Vitamin D deficiency 04/14/2015    Fibromyalgia 12/15/2014    Insomnia 07/04/2014    Osteoarthritis 03/27/2014    Lumbar spondylosis 01/07/2014    Neuropathic pain 05/08/2012     OP:  11/1/16  Right total hip arthroplasty    Subjective: Alyssa Harrell is follow-up from the above surgery. Patient is WBAT on that extremity. He ambulates well without any assistive devices. Denies any real pain to the bilateral hips. States his low back continues to be an issue. O: Alert and oriented X 3, no acute distress, respirations easy and unlabored with no audible wheezes, skin warm and dry, speech and dress appropriate for noted age, affect euthymic. Vitals:    05/28/20 0831   BP: (!) 135/98   Site: Left Lower Arm   Position: Sitting   Cuff Size: Medium Adult   Pulse: 113   Weight: (!) 315 lb (142.9 kg)   Height: 6' 1\" (1.854 m)     Bilateral Lower Extremity:  Skin intact.  Incision is well-healed without erythema, drainage, or fluctuant pocket noted  No swelling to hip   No pain elicited on log roll exam and abduction/adduction of hip  5/5 hip flexion/abduction  Thigh compartments soft  Calves supple non tender  + DF/PF/EHL  Sensation intact  Ambulates well without assistive device    X-rays   Right hip-previous total hip arthroplasty, components appear to be in stable alignment, no fracture, no interval changes. Left hip replacement also visualized, appears well fixated no interval changes    A:   Diagnosis Orders   1. Primary osteoarthritis of left hip     2.  Primary osteoarthritis of right hip       Plan:  Reviewed x-rays with patient, Hardware stable and well aligned  Continue with activities as tolerated  Patient to follow up in 2 years for repeat evaluation or sooner if needed    Electronically Signed By  Mk Licea D.O.  5/28/2020

## 2020-06-01 ENCOUNTER — TELEPHONE (OUTPATIENT)
Dept: FAMILY MEDICINE CLINIC | Age: 45
End: 2020-06-01

## 2020-06-01 ENCOUNTER — HOSPITAL ENCOUNTER (OUTPATIENT)
Age: 45
Discharge: HOME | End: 2020-06-01

## 2020-06-01 ENCOUNTER — HOSPITAL ENCOUNTER (OUTPATIENT)
Dept: MRI IMAGING | Age: 45
Discharge: HOME | End: 2020-06-03

## 2020-06-01 LAB
ALBUMIN SERPL-MCNC: 3.9 G/DL (ref 3.5–5.2)
ALP BLD-CCNC: 97 U/L (ref 40–129)
ALT SERPL-CCNC: 38 U/L (ref 0–40)
ANION GAP SERPL CALCULATED.3IONS-SCNC: 30 MMOL/L (ref 7–16)
AST SERPL-CCNC: 38 U/L (ref 0–39)
BASOPHILS ABSOLUTE: 0.02 E9/L (ref 0–0.2)
BASOPHILS RELATIVE PERCENT: 0.1 % (ref 0–2)
BILIRUB SERPL-MCNC: 0.4 MG/DL (ref 0–1.2)
BUN BLDV-MCNC: 81 MG/DL (ref 6–20)
CALCIUM SERPL-MCNC: 8 MG/DL (ref 8.6–10.2)
CHLORIDE BLD-SCNC: 85 MMOL/L (ref 98–107)
CHOLESTEROL, TOTAL: 148 MG/DL (ref 0–199)
CO2: 12 MMOL/L (ref 22–29)
CREAT SERPL-MCNC: 8.1 MG/DL (ref 0.7–1.2)
EOSINOPHILS ABSOLUTE: 0.15 E9/L (ref 0.05–0.5)
EOSINOPHILS RELATIVE PERCENT: 1 % (ref 0–6)
GFR AFRICAN AMERICAN: 9
GFR NON-AFRICAN AMERICAN: 7 ML/MIN/1.73
GLUCOSE BLD-MCNC: 266 MG/DL (ref 74–99)
HCT VFR BLD CALC: 37.1 % (ref 37–54)
HDLC SERPL-MCNC: 35 MG/DL
HEMOGLOBIN: 11.9 G/DL (ref 12.5–16.5)
IMMATURE GRANULOCYTES #: 0.1 E9/L
IMMATURE GRANULOCYTES %: 0.7 % (ref 0–5)
LDL CHOLESTEROL CALCULATED: 63 MG/DL (ref 0–99)
LYMPHOCYTES ABSOLUTE: 0.7 E9/L (ref 1.5–4)
LYMPHOCYTES RELATIVE PERCENT: 4.6 % (ref 20–42)
MCH RBC QN AUTO: 28.7 PG (ref 26–35)
MCHC RBC AUTO-ENTMCNC: 32.1 % (ref 32–34.5)
MCV RBC AUTO: 89.4 FL (ref 80–99.9)
MONOCYTES ABSOLUTE: 1.37 E9/L (ref 0.1–0.95)
MONOCYTES RELATIVE PERCENT: 9.1 % (ref 2–12)
NEUTROPHILS ABSOLUTE: 12.75 E9/L (ref 1.8–7.3)
NEUTROPHILS RELATIVE PERCENT: 84.5 % (ref 43–80)
PDW BLD-RTO: 13.2 FL (ref 11.5–15)
PLATELET # BLD: 295 E9/L (ref 130–450)
PMV BLD AUTO: 10 FL (ref 7–12)
POTASSIUM SERPL-SCNC: 6.4 MMOL/L (ref 3.5–5)
RBC # BLD: 4.15 E12/L (ref 3.8–5.8)
SODIUM BLD-SCNC: 127 MMOL/L (ref 132–146)
TOTAL PROTEIN: 6.5 G/DL (ref 6.4–8.3)
TRIGL SERPL-MCNC: 252 MG/DL (ref 0–149)
TSH SERPL DL<=0.05 MIU/L-ACNC: 0.67 UIU/ML (ref 0.27–4.2)
VLDLC SERPL CALC-MCNC: 50 MG/DL
WBC # BLD: 15.1 E9/L (ref 4.5–11.5)

## 2020-06-01 PROCEDURE — 84443 ASSAY THYROID STIM HORMONE: CPT

## 2020-06-01 PROCEDURE — 85025 COMPLETE CBC W/AUTO DIFF WBC: CPT

## 2020-06-01 PROCEDURE — 36415 COLL VENOUS BLD VENIPUNCTURE: CPT

## 2020-06-01 PROCEDURE — 80061 LIPID PANEL: CPT

## 2020-06-01 PROCEDURE — 73221 MRI JOINT UPR EXTREM W/O DYE: CPT

## 2020-06-01 PROCEDURE — 80053 COMPREHEN METABOLIC PANEL: CPT

## 2020-06-02 ENCOUNTER — TELEPHONE (OUTPATIENT)
Dept: FAMILY MEDICINE CLINIC | Age: 45
End: 2020-06-02

## 2020-06-02 NOTE — TELEPHONE ENCOUNTER
Called pt on both mobile and home numbers, no answer.  Left messages, advising pt to call office ASAP regarding abnormal labs, pt also did not return phone call to office yesterday

## 2020-07-09 ENCOUNTER — TELEPHONE (OUTPATIENT)
Dept: FAMILY MEDICINE CLINIC | Age: 45
End: 2020-07-09

## 2020-07-09 NOTE — TELEPHONE ENCOUNTER
Pt called and said he is still in hospital will be discharged tomorrow to skilled nursing facility , but the hospital would like to talk to you in regards to some blood work because of his blood clots. Patient would like a call back from [facility].        [phone]

## 2020-07-14 NOTE — TELEPHONE ENCOUNTER
Patient still in hospital and will be dc'd to skilled nursing- they have not determined a release date

## 2020-07-31 ENCOUNTER — HOSPITAL ENCOUNTER (INPATIENT)
Age: 45
LOS: 11 days | Discharge: NURSING, REHAB OR LONG-TERM CARE | DRG: 720 | End: 2020-08-11
Attending: EMERGENCY MEDICINE | Admitting: FAMILY MEDICINE
Payer: MEDICAID

## 2020-07-31 ENCOUNTER — TELEPHONE (OUTPATIENT)
Dept: FAMILY MEDICINE CLINIC | Age: 45
End: 2020-07-31

## 2020-07-31 ENCOUNTER — HOSPITAL ENCOUNTER (OUTPATIENT)
Dept: CT IMAGING | Age: 45
Discharge: HOME | DRG: 720 | End: 2020-08-02
Payer: MEDICAID

## 2020-07-31 PROBLEM — K52.9 COLITIS: Status: ACTIVE | Noted: 2020-07-31

## 2020-07-31 LAB
ABO/RH: NORMAL
ALBUMIN SERPL-MCNC: 3 G/DL (ref 3.5–5.2)
ALP BLD-CCNC: 93 U/L (ref 40–129)
ALT SERPL-CCNC: 7 U/L (ref 0–40)
ANION GAP SERPL CALCULATED.3IONS-SCNC: 15 MMOL/L (ref 7–16)
ANTIBODY SCREEN: NORMAL
APTT: 38.2 SEC (ref 24.5–35.1)
AST SERPL-CCNC: 8 U/L (ref 0–39)
BASOPHILS ABSOLUTE: 0.08 E9/L (ref 0–0.2)
BASOPHILS RELATIVE PERCENT: 0.8 % (ref 0–2)
BILIRUB SERPL-MCNC: <0.2 MG/DL (ref 0–1.2)
BUN BLDV-MCNC: 8 MG/DL (ref 6–20)
CALCIUM SERPL-MCNC: 9.1 MG/DL (ref 8.6–10.2)
CHLORIDE BLD-SCNC: 96 MMOL/L (ref 98–107)
CO2: 23 MMOL/L (ref 22–29)
CREAT SERPL-MCNC: 0.9 MG/DL (ref 0.7–1.2)
EOSINOPHILS ABSOLUTE: 0.33 E9/L (ref 0.05–0.5)
EOSINOPHILS RELATIVE PERCENT: 3.5 % (ref 0–6)
GFR AFRICAN AMERICAN: >60
GFR NON-AFRICAN AMERICAN: >60 ML/MIN/1.73
GLUCOSE BLD-MCNC: 188 MG/DL (ref 74–99)
HCT VFR BLD CALC: 35.7 % (ref 37–54)
HEMOGLOBIN: 11.1 G/DL (ref 12.5–16.5)
IMMATURE GRANULOCYTES #: 0.04 E9/L
IMMATURE GRANULOCYTES %: 0.4 % (ref 0–5)
INR BLD: 1.2
LACTIC ACID: 1.7 MMOL/L (ref 0.5–2.2)
LIPASE: 11 U/L (ref 13–60)
LYMPHOCYTES ABSOLUTE: 2.8 E9/L (ref 1.5–4)
LYMPHOCYTES RELATIVE PERCENT: 29.4 % (ref 20–42)
MCH RBC QN AUTO: 27.6 PG (ref 26–35)
MCHC RBC AUTO-ENTMCNC: 31.1 % (ref 32–34.5)
MCV RBC AUTO: 88.8 FL (ref 80–99.9)
MONOCYTES ABSOLUTE: 1.34 E9/L (ref 0.1–0.95)
MONOCYTES RELATIVE PERCENT: 14.1 % (ref 2–12)
NEUTROPHILS ABSOLUTE: 4.94 E9/L (ref 1.8–7.3)
NEUTROPHILS RELATIVE PERCENT: 51.8 % (ref 43–80)
PDW BLD-RTO: 15 FL (ref 11.5–15)
PLATELET # BLD: 1190 E9/L (ref 130–450)
PMV BLD AUTO: 8.4 FL (ref 7–12)
POTASSIUM REFLEX MAGNESIUM: 3.8 MMOL/L (ref 3.5–5)
PROTHROMBIN TIME: 12.9 SEC (ref 9.3–12.4)
RBC # BLD: 4.02 E12/L (ref 3.8–5.8)
SODIUM BLD-SCNC: 134 MMOL/L (ref 132–146)
TOTAL PROTEIN: 6.3 G/DL (ref 6.4–8.3)
WBC # BLD: 9.5 E9/L (ref 4.5–11.5)

## 2020-07-31 PROCEDURE — 86900 BLOOD TYPING SEROLOGIC ABO: CPT

## 2020-07-31 PROCEDURE — 74177 CT ABD & PELVIS W/CONTRAST: CPT

## 2020-07-31 PROCEDURE — 6360000004 HC RX CONTRAST MEDICATION: Performed by: RADIOLOGY

## 2020-07-31 PROCEDURE — 99254 IP/OBS CNSLTJ NEW/EST MOD 60: CPT | Performed by: SURGERY

## 2020-07-31 PROCEDURE — 85610 PROTHROMBIN TIME: CPT

## 2020-07-31 PROCEDURE — 85025 COMPLETE CBC W/AUTO DIFF WBC: CPT

## 2020-07-31 PROCEDURE — 99285 EMERGENCY DEPT VISIT HI MDM: CPT

## 2020-07-31 PROCEDURE — 86644 CMV ANTIBODY: CPT

## 2020-07-31 PROCEDURE — 2580000003 HC RX 258: Performed by: RADIOLOGY

## 2020-07-31 PROCEDURE — 80053 COMPREHEN METABOLIC PANEL: CPT

## 2020-07-31 PROCEDURE — 86645 CMV ANTIBODY IGM: CPT

## 2020-07-31 PROCEDURE — 83690 ASSAY OF LIPASE: CPT

## 2020-07-31 PROCEDURE — 83605 ASSAY OF LACTIC ACID: CPT

## 2020-07-31 PROCEDURE — 2060000000 HC ICU INTERMEDIATE R&B

## 2020-07-31 PROCEDURE — 87040 BLOOD CULTURE FOR BACTERIA: CPT

## 2020-07-31 PROCEDURE — 85730 THROMBOPLASTIN TIME PARTIAL: CPT

## 2020-07-31 PROCEDURE — 86901 BLOOD TYPING SEROLOGIC RH(D): CPT

## 2020-07-31 PROCEDURE — 86850 RBC ANTIBODY SCREEN: CPT

## 2020-07-31 PROCEDURE — 2580000003 HC RX 258: Performed by: EMERGENCY MEDICINE

## 2020-07-31 PROCEDURE — 36415 COLL VENOUS BLD VENIPUNCTURE: CPT

## 2020-07-31 RX ORDER — INSULIN GLARGINE 100 [IU]/ML
20 INJECTION, SOLUTION SUBCUTANEOUS NIGHTLY
Status: ON HOLD | COMMUNITY
End: 2020-09-11 | Stop reason: HOSPADM

## 2020-07-31 RX ORDER — INSULIN LISPRO 100 [IU]/ML
5 INJECTION, SOLUTION INTRAVENOUS; SUBCUTANEOUS
COMMUNITY
End: 2020-09-03

## 2020-07-31 RX ORDER — CHOLECALCIFEROL (VITAMIN D3) 1250 MCG
5000 CAPSULE ORAL DAILY
COMMUNITY
End: 2020-09-03

## 2020-07-31 RX ORDER — LOPERAMIDE HYDROCHLORIDE 2 MG/1
2 TABLET ORAL 4 TIMES DAILY PRN
COMMUNITY
End: 2020-09-03

## 2020-07-31 RX ORDER — SODIUM CHLORIDE 9 MG/ML
1000 INJECTION, SOLUTION INTRAVENOUS CONTINUOUS
Status: DISCONTINUED | OUTPATIENT
Start: 2020-07-31 | End: 2020-08-04

## 2020-07-31 RX ORDER — NYSTATIN 100000 U/G
CREAM TOPICAL 2 TIMES DAILY
COMMUNITY
End: 2020-09-03

## 2020-07-31 RX ORDER — OXYCODONE HYDROCHLORIDE AND ACETAMINOPHEN 5; 325 MG/1; MG/1
1 TABLET ORAL EVERY 6 HOURS PRN
Status: DISCONTINUED | OUTPATIENT
Start: 2020-07-31 | End: 2020-08-11 | Stop reason: HOSPADM

## 2020-07-31 RX ORDER — OXYCODONE HYDROCHLORIDE AND ACETAMINOPHEN 5; 325 MG/1; MG/1
1 TABLET ORAL EVERY 6 HOURS PRN
Status: ON HOLD | COMMUNITY
End: 2020-09-11 | Stop reason: SDUPTHER

## 2020-07-31 RX ORDER — SAW PALMETTO 250 MG
3 CAPSULE ORAL
COMMUNITY
End: 2020-09-03

## 2020-07-31 RX ORDER — 0.9 % SODIUM CHLORIDE 0.9 %
1000 INTRAVENOUS SOLUTION INTRAVENOUS ONCE
Status: COMPLETED | OUTPATIENT
Start: 2020-07-31 | End: 2020-07-31

## 2020-07-31 RX ORDER — SODIUM CHLORIDE 0.9 % (FLUSH) 0.9 %
10 SYRINGE (ML) INJECTION
Status: COMPLETED | OUTPATIENT
Start: 2020-07-31 | End: 2020-07-31

## 2020-07-31 RX ORDER — PANTOPRAZOLE SODIUM 40 MG/1
40 TABLET, DELAYED RELEASE ORAL DAILY
COMMUNITY
End: 2021-10-08

## 2020-07-31 RX ORDER — MORPHINE SULFATE 2 MG/ML
2 INJECTION, SOLUTION INTRAMUSCULAR; INTRAVENOUS EVERY 4 HOURS PRN
Status: DISCONTINUED | OUTPATIENT
Start: 2020-07-31 | End: 2020-08-11 | Stop reason: HOSPADM

## 2020-07-31 RX ORDER — PIPERACILLIN SODIUM, TAZOBACTAM SODIUM 4; .5 G/20ML; G/20ML
4.5 INJECTION, POWDER, LYOPHILIZED, FOR SOLUTION INTRAVENOUS EVERY 6 HOURS
Status: ON HOLD | COMMUNITY
End: 2020-08-11 | Stop reason: HOSPADM

## 2020-07-31 RX ADMIN — SODIUM CHLORIDE 1000 ML: 9 INJECTION, SOLUTION INTRAVENOUS at 17:35

## 2020-07-31 RX ADMIN — IOPAMIDOL 110 ML: 755 INJECTION, SOLUTION INTRAVENOUS at 13:54

## 2020-07-31 RX ADMIN — IOHEXOL 50 ML: 240 INJECTION, SOLUTION INTRATHECAL; INTRAVASCULAR; INTRAVENOUS; ORAL at 13:54

## 2020-07-31 RX ADMIN — Medication 10 ML: at 13:54

## 2020-07-31 RX ADMIN — SODIUM CHLORIDE 1000 ML: 9 INJECTION, SOLUTION INTRAVENOUS at 20:13

## 2020-07-31 ASSESSMENT — PAIN DESCRIPTION - FREQUENCY: FREQUENCY: CONTINUOUS

## 2020-07-31 ASSESSMENT — PAIN DESCRIPTION - ONSET: ONSET: ON-GOING

## 2020-07-31 ASSESSMENT — PAIN DESCRIPTION - LOCATION: LOCATION: ABDOMEN;BUTTOCKS

## 2020-07-31 ASSESSMENT — PAIN DESCRIPTION - ORIENTATION: ORIENTATION: MID;LOWER

## 2020-07-31 ASSESSMENT — PAIN SCALES - GENERAL
PAINLEVEL_OUTOF10: 4
PAINLEVEL_OUTOF10: 10

## 2020-07-31 ASSESSMENT — PAIN DESCRIPTION - PAIN TYPE: TYPE: ACUTE PAIN

## 2020-07-31 ASSESSMENT — PAIN DESCRIPTION - PROGRESSION: CLINICAL_PROGRESSION: NOT CHANGED

## 2020-07-31 NOTE — TELEPHONE ENCOUNTER
Patient's care facility notified of CT results and advised that he needed admitted for treatment- nurse I spoke with at facility will contact their doctor and get him to the hospital.

## 2020-07-31 NOTE — ED NOTES
Bed: 11  Expected date:   Expected time:   Means of arrival:   Comments:  Paulette Aguilar, ASHLEY  07/31/20 9740

## 2020-07-31 NOTE — ED PROVIDER NOTES
HPI:  7/31/20,   Time: 5:44 PM EDT       Suri Chahal II is a 53 y.o. male presenting to the ED for abnml ct, beginnin6 hrs ago. The complaint has been persistent, severe in severity, and worsened by nothing. Pt hx spleenic infarction tx at [address], resident of [facility], 2 weeks diarrhea, had outpt ct with abnml results, sent in for further eval.  Pt states some lower abd pain, no fever, no n/v. Pos diarrhea    Review of Systems:   Pertinent positives and negatives are stated within HPI, all other systems reviewed and are negative.          --------------------------------------------- PAST HISTORY ---------------------------------------------  Past Medical History:  has a past medical history of Accident, SIMÓN (acute kidney injury) ([facility] [address] Allergic rhinitis, Chronic back pain, Depression, Diabetes mellitus ([address] Difficulty sleeping, Displacement of lumbar intervertebral disc without myelopathy, Fibromyalgia, Fractured rib, H/O seasonal allergies, Head injury, Hyperlipidemia, Hypertension, Obesity (BMI 35.0-39.9 without comorbidity), Osteoarthritis, and Thoracic or lumbosacral neuritis or radiculitis, unspecified. Past Surgical History:  has a past surgical history that includes Neck surgery (2000); shoulder surgery (2001 &2007); Wrist surgery (2007); Rotator cuff repair (Left, 2014); hernia repair (2001); Shoulder arthroscopy (Left, 11 21 14); Vasectomy; Hip Arthroplasty (Left, 4/11/2016); Total hip arthroplasty (Right, 10/31/2016); Foot surgery (Right, 1985); pr colonoscopy flx dx w/collj spec when pfrmd (N/A, 5/7/2018); and hernia repair (Right, 12/9/2019). Social History:  reports that he has never smoked. His smokeless tobacco use includes chew. He reports previous alcohol use. He reports that he does not use drugs.     Family History: family history includes Arthritis in his father; Cancer in his maternal grandfather and paternal uncle; Diabetes in his father and paternal grandfather; Heart Disease in his maternal grandmother; Hypertension in his mother; Stroke in his maternal aunt. The patients home medications have been reviewed. Allergies: Seasonal and Tramadol        ---------------------------------------------------PHYSICAL EXAM--------------------------------------    Constitutional/General: Alert and oriented x3, well appearing, non toxic in NAD  Head: Normocephalic and atraumatic  Eyes: PERRL, EOMI, conjunctive normal, sclera non icteric  Mouth: Oropharynx clear, handling secretions, no trismus, no asymmetry of the posterior oropharynx or uvular edema  Neck: Supple, full ROM, non tender to palpation in the midline, no stridor, no crepitus, no meningeal signs  Respiratory: Lungs clear to auscultation bilaterally, no wheezes, rales, or rhonchi. Not in respiratory distress  Cardiovascular:  tachy rate. Regular rhythm. No murmurs, gallops, or rubs. 2+ distal pulses  Chest: No chest wall tenderness  GI:  Abdomen Soft, Non tender, Non distended. +BS. No organomegaly, no palpable masses,  No rebound, guarding, or rigidity. Musculoskeletal: Moves all extremities x 4. Warm and well perfused, no clubbing, cyanosis, or edema. Capillary refill <3 seconds  Integument: sacarl decub  Lymphatic: no lymphadenopathy noted  Neurologic: GCS 15, no focal deficits, symmetric strength 5/5 in the upper and lower extremities bilaterally  Psychiatric: Normal Affect    -------------------------------------------------- RESULTS -------------------------------------------------  I have personally reviewed all laboratory and imaging results for this patient. Results are listed below.      LABS:  Results for orders placed or performed during the hospital encounter of 07/31/20   CBC Auto Differential   Result Value Ref Range    WBC 9.5 4.5 - 11.5 E9/L    RBC 4.02 3.80 - 5.80 E12/L    Hemoglobin 11.1 (L) 12.5 - 16.5 g/dL    Hematocrit 35.7 (L) 37.0 - 54.0 %    MCV 88.8 80.0 - 99.9 fL    MCH 27.6 26.0 - 35.0 pg    MCHC 31.1 (L) 32.0 - 34.5 %    RDW 15.0 11.5 - 15.0 fL    Platelets 3,321 (H) 934 - 450 E9/L    MPV 8.4 7.0 - 12.0 fL    Neutrophils % 51.8 43.0 - 80.0 %    Immature Granulocytes % 0.4 0.0 - 5.0 %    Lymphocytes % 29.4 20.0 - 42.0 %    Monocytes % 14.1 (H) 2.0 - 12.0 %    Eosinophils % 3.5 0.0 - 6.0 %    Basophils % 0.8 0.0 - 2.0 %    Neutrophils Absolute 4.94 1.80 - 7.30 E9/L    Immature Granulocytes # 0.04 E9/L    Lymphocytes Absolute 2.80 1.50 - 4.00 E9/L    Monocytes Absolute 1.34 (H) 0.10 - 0.95 E9/L    Eosinophils Absolute 0.33 0.05 - 0.50 E9/L    Basophils Absolute 0.08 0.00 - 0.20 E9/L   Comprehensive Metabolic Panel w/ Reflex to MG   Result Value Ref Range    Sodium 134 132 - 146 mmol/L    Potassium reflex Magnesium 3.8 3.5 - 5.0 mmol/L    Chloride 96 (L) 98 - 107 mmol/L    CO2 23 22 - 29 mmol/L    Anion Gap 15 7 - 16 mmol/L    Glucose 188 (H) 74 - 99 mg/dL    BUN 8 6 - 20 mg/dL    CREATININE 0.9 0.7 - 1.2 mg/dL    GFR Non-African American >60 >=60 mL/min/1.73    GFR African American >60     Calcium 9.1 8.6 - 10.2 mg/dL    Total Protein 6.3 (L) 6.4 - 8.3 g/dL    Alb 3.0 (L) 3.5 - 5.2 g/dL    Total Bilirubin <0.2 0.0 - 1.2 mg/dL    Alkaline Phosphatase 93 40 - 129 U/L    ALT 7 0 - 40 U/L    AST 8 0 - 39 U/L   Lipase   Result Value Ref Range    Lipase 11 (L) 13 - 60 U/L   Protime-INR   Result Value Ref Range    Protime 12.9 (H) 9.3 - 12.4 sec    INR 1.2    APTT   Result Value Ref Range    aPTT 38.2 (H) 24.5 - 35.1 sec   Lactic Acid, Plasma   Result Value Ref Range    Lactic Acid 1.7 0.5 - 2.2 mmol/L   TYPE AND SCREEN   Result Value Ref Range    ABO/Rh O NEG     Antibody Screen NEG        RADIOLOGY:  Interpreted by Radiologist.  No orders to display       EKG: This EKG is signed and interpreted by the EP.     Time:   Rate:   Rhythm:   Interpretation:   Comparison:       ------------------------- NURSING NOTES AND VITALS REVIEWED ---------------------------   The nursing notes within the ED encounter and vital signs as below have been reviewed by myself. /86   Pulse 100   Temp 97.4 °F (36.3 °C) (Oral)   Resp 16   Wt (!) 315 lb (142.9 kg)   SpO2 98%   BMI 41.56 kg/m²   Oxygen Saturation Interpretation: Normal    The patients available past medical records and past encounters were reviewed. ------------------------------ ED COURSE/MEDICAL DECISION MAKING----------------------  Medications   0.9 % sodium chloride infusion (1,000 mLs Intravenous New Bag 7/31/20 2013)   0.9 % sodium chloride bolus (0 mLs Intravenous Stopped 7/31/20 2109)         ED COURSE:       Medical Decision Making:    Ct reviewed, surgery consulted, eval pt in ed, abd benign, no emergent surgical intervention at this time, rec med admit, cmv anitbodies and supp care. Sounds consulted, will admit      This patient's ED course included: a personal history and physicial examination    This patient has remained hemodynamically stable during their ED course. Re-Evaluations:             Re-evaluation. Patients symptoms are improving    Re-examination  7/31/20   5:44 PM EDT          Vital Signs:   Vitals:    07/31/20 1718 07/31/20 1719 07/31/20 2013 07/31/20 2204   BP: 133/83  113/72 118/86   Pulse: 108 138 114 100   Resp: 20 20 16   Temp: 98.8 °F (37.1 °C)   97.4 °F (36.3 °C)   TempSrc: Tympanic   Oral   SpO2: 98%  98% 98%   Weight: (!) 315 lb (142.9 kg)        Card/Pulm:  Rhythm: rapid rate. Heart Sounds: no murmurs, gallops, or rubs. clear to auscultation, no wheezes or rales and unlabored breathing. Capillary Refill: normal.  Radial Pulse:  equal.  Skin:  Warm. Consultations:             Surgery  sound    Critical Care:         Counseling: The emergency provider has spoken with the patient and discussed todays results, in addition to providing specific details for the plan of care and counseling regarding the diagnosis and prognosis. Questions are answered at this time and they are agreeable with the plan. --------------------------------- IMPRESSION AND DISPOSITION ---------------------------------    IMPRESSION  1. Splenic infarct    2. Colitis    3. Pressure injury of skin of sacral region, unspecified injury stage        DISPOSITION  Disposition: Admit to med/surg floor  Patient condition is fair    NOTE: This report was transcribed using voice recognition software.  Every effort was made to ensure accuracy; however, inadvertent computerized transcription errors may be present        Shahnaz Coreas MD  07/31/20 5457

## 2020-08-01 ENCOUNTER — APPOINTMENT (OUTPATIENT)
Dept: CT IMAGING | Age: 45
DRG: 720 | End: 2020-08-01
Payer: MEDICAID

## 2020-08-01 ENCOUNTER — APPOINTMENT (OUTPATIENT)
Dept: GENERAL RADIOLOGY | Age: 45
DRG: 720 | End: 2020-08-01
Payer: MEDICAID

## 2020-08-01 ENCOUNTER — APPOINTMENT (OUTPATIENT)
Dept: NUCLEAR MEDICINE | Age: 45
DRG: 720 | End: 2020-08-01
Payer: MEDICAID

## 2020-08-01 PROBLEM — D73.4 CYST OF SPLEEN: Status: ACTIVE | Noted: 2020-08-01

## 2020-08-01 PROBLEM — R93.2 ABNORMAL FINDINGS ON DIAGNOSTIC IMAGING OF GALL BLADDER: Status: ACTIVE | Noted: 2020-08-01

## 2020-08-01 PROBLEM — I26.99 PULMONARY EMBOLISM (HCC): Status: ACTIVE | Noted: 2020-08-01

## 2020-08-01 PROBLEM — D73.5 SPLENIC INFARCTION: Status: ACTIVE | Noted: 2020-08-01

## 2020-08-01 PROBLEM — D73.3 SPLENIC ABSCESS: Status: ACTIVE | Noted: 2020-08-01

## 2020-08-01 PROBLEM — L03.319 CELLULITIS OF SACRAL REGION: Status: ACTIVE | Noted: 2019-07-02

## 2020-08-01 PROBLEM — D64.9 ANEMIA: Status: ACTIVE | Noted: 2020-08-01

## 2020-08-01 PROBLEM — L89.159 DECUBITUS ULCER OF SACRAL AREA: Status: ACTIVE | Noted: 2020-08-01

## 2020-08-01 LAB
ALBUMIN SERPL-MCNC: 2.6 G/DL (ref 3.5–5.2)
ALP BLD-CCNC: 84 U/L (ref 40–129)
ALT SERPL-CCNC: 5 U/L (ref 0–40)
ANION GAP SERPL CALCULATED.3IONS-SCNC: 11 MMOL/L (ref 7–16)
ANION GAP SERPL CALCULATED.3IONS-SCNC: 12 MMOL/L (ref 7–16)
ANISOCYTOSIS: ABNORMAL
AST SERPL-CCNC: 5 U/L (ref 0–39)
BASOPHILS ABSOLUTE: 0.04 E9/L (ref 0–0.2)
BASOPHILS ABSOLUTE: 0.22 E9/L (ref 0–0.2)
BASOPHILS RELATIVE PERCENT: 0.4 % (ref 0–2)
BASOPHILS RELATIVE PERCENT: 1.7 % (ref 0–2)
BILIRUB SERPL-MCNC: <0.2 MG/DL (ref 0–1.2)
BILIRUBIN URINE: NEGATIVE
BLOOD, URINE: NEGATIVE
BUN BLDV-MCNC: 11 MG/DL (ref 6–20)
BUN BLDV-MCNC: 5 MG/DL (ref 6–20)
C-REACTIVE PROTEIN: 3.9 MG/DL (ref 0–0.4)
CALCIUM SERPL-MCNC: 8.7 MG/DL (ref 8.6–10.2)
CALCIUM SERPL-MCNC: 8.9 MG/DL (ref 8.6–10.2)
CHLORIDE BLD-SCNC: 101 MMOL/L (ref 98–107)
CHLORIDE BLD-SCNC: 102 MMOL/L (ref 98–107)
CLARITY: CLEAR
CO2: 24 MMOL/L (ref 22–29)
CO2: 26 MMOL/L (ref 22–29)
COLOR: YELLOW
CREAT SERPL-MCNC: 0.7 MG/DL (ref 0.7–1.2)
CREAT SERPL-MCNC: 1.3 MG/DL (ref 0.7–1.2)
D DIMER: 467 NG/ML DDU
D DIMER: 641 NG/ML DDU
EOSINOPHILS ABSOLUTE: 0.12 E9/L (ref 0.05–0.5)
EOSINOPHILS ABSOLUTE: 0.12 E9/L (ref 0.05–0.5)
EOSINOPHILS RELATIVE PERCENT: 0.9 % (ref 0–6)
EOSINOPHILS RELATIVE PERCENT: 1.3 % (ref 0–6)
FERRITIN: 233 NG/ML
GFR AFRICAN AMERICAN: >60
GFR AFRICAN AMERICAN: >60
GFR NON-AFRICAN AMERICAN: 59 ML/MIN/1.73
GFR NON-AFRICAN AMERICAN: >60 ML/MIN/1.73
GLUCOSE BLD-MCNC: 109 MG/DL (ref 74–99)
GLUCOSE BLD-MCNC: 132 MG/DL (ref 74–99)
GLUCOSE URINE: NEGATIVE MG/DL
HBA1C MFR BLD: 6.3 % (ref 4–5.6)
HCT VFR BLD CALC: 30.9 % (ref 37–54)
HCT VFR BLD CALC: 32.7 % (ref 37–54)
HEMOGLOBIN: 10.2 G/DL (ref 12.5–16.5)
HEMOGLOBIN: 9.6 G/DL (ref 12.5–16.5)
IMMATURE GRANULOCYTES #: 0.03 E9/L
IMMATURE GRANULOCYTES %: 0.3 % (ref 0–5)
IRON SATURATION: 17 % (ref 20–55)
IRON: 24 MCG/DL (ref 59–158)
KETONES, URINE: NEGATIVE MG/DL
LACTIC ACID: 2.1 MMOL/L (ref 0.5–2.2)
LEUKOCYTE ESTERASE, URINE: NEGATIVE
LYMPHOCYTES ABSOLUTE: 1.95 E9/L (ref 1.5–4)
LYMPHOCYTES ABSOLUTE: 2.65 E9/L (ref 1.5–4)
LYMPHOCYTES RELATIVE PERCENT: 14.7 % (ref 20–42)
LYMPHOCYTES RELATIVE PERCENT: 29.7 % (ref 20–42)
MCH RBC QN AUTO: 27.8 PG (ref 26–35)
MCH RBC QN AUTO: 28 PG (ref 26–35)
MCHC RBC AUTO-ENTMCNC: 31.1 % (ref 32–34.5)
MCHC RBC AUTO-ENTMCNC: 31.2 % (ref 32–34.5)
MCV RBC AUTO: 89.6 FL (ref 80–99.9)
MCV RBC AUTO: 89.8 FL (ref 80–99.9)
METER GLUCOSE: 108 MG/DL (ref 74–99)
METER GLUCOSE: 145 MG/DL (ref 74–99)
METER GLUCOSE: 148 MG/DL (ref 74–99)
METER GLUCOSE: 178 MG/DL (ref 74–99)
MONOCYTES ABSOLUTE: 1.37 E9/L (ref 0.1–0.95)
MONOCYTES ABSOLUTE: 2.34 E9/L (ref 0.1–0.95)
MONOCYTES RELATIVE PERCENT: 15.3 % (ref 2–12)
MONOCYTES RELATIVE PERCENT: 18.1 % (ref 2–12)
NEUTROPHILS ABSOLUTE: 4.72 E9/L (ref 1.8–7.3)
NEUTROPHILS ABSOLUTE: 8.45 E9/L (ref 1.8–7.3)
NEUTROPHILS RELATIVE PERCENT: 53 % (ref 43–80)
NEUTROPHILS RELATIVE PERCENT: 64.7 % (ref 43–80)
NITRITE, URINE: NEGATIVE
NUCLEATED RED BLOOD CELLS: 0.9 /100 WBC
PDW BLD-RTO: 15 FL (ref 11.5–15)
PDW BLD-RTO: 15.2 FL (ref 11.5–15)
PH UA: 6.5 (ref 5–9)
PHOSPHORUS: 4.3 MG/DL (ref 2.5–4.5)
PLATELET # BLD: 1034 E9/L (ref 130–450)
PLATELET # BLD: 922 E9/L (ref 130–450)
PMV BLD AUTO: 8.2 FL (ref 7–12)
PMV BLD AUTO: 8.4 FL (ref 7–12)
POIKILOCYTES: ABNORMAL
POLYCHROMASIA: ABNORMAL
POTASSIUM SERPL-SCNC: 3.8 MMOL/L (ref 3.5–5)
POTASSIUM SERPL-SCNC: 3.9 MMOL/L (ref 3.5–5)
PROCALCITONIN: 0.08 NG/ML (ref 0–0.08)
PROCALCITONIN: 0.09 NG/ML (ref 0–0.08)
PROTEIN UA: NEGATIVE MG/DL
RBC # BLD: 3.45 E12/L (ref 3.8–5.8)
RBC # BLD: 3.64 E12/L (ref 3.8–5.8)
SODIUM BLD-SCNC: 137 MMOL/L (ref 132–146)
SODIUM BLD-SCNC: 139 MMOL/L (ref 132–146)
SPECIFIC GRAVITY UA: 1.01 (ref 1–1.03)
TARGET CELLS: ABNORMAL
TOTAL IRON BINDING CAPACITY: 145 MCG/DL (ref 250–450)
TOTAL PROTEIN: 5.3 G/DL (ref 6.4–8.3)
TROPONIN: 0.08 NG/ML (ref 0–0.03)
TSH SERPL DL<=0.05 MIU/L-ACNC: 1.3 UIU/ML (ref 0.27–4.2)
UROBILINOGEN, URINE: 0.2 E.U./DL
WBC # BLD: 13 E9/L (ref 4.5–11.5)
WBC # BLD: 8.9 E9/L (ref 4.5–11.5)

## 2020-08-01 PROCEDURE — 2580000003 HC RX 258: Performed by: FAMILY MEDICINE

## 2020-08-01 PROCEDURE — 83605 ASSAY OF LACTIC ACID: CPT

## 2020-08-01 PROCEDURE — 78226 HEPATOBILIARY SYSTEM IMAGING: CPT

## 2020-08-01 PROCEDURE — 87186 SC STD MICRODIL/AGAR DIL: CPT

## 2020-08-01 PROCEDURE — 83036 HEMOGLOBIN GLYCOSYLATED A1C: CPT

## 2020-08-01 PROCEDURE — 86140 C-REACTIVE PROTEIN: CPT

## 2020-08-01 PROCEDURE — 99232 SBSQ HOSP IP/OBS MODERATE 35: CPT | Performed by: SURGERY

## 2020-08-01 PROCEDURE — A9537 TC99M MEBROFENIN: HCPCS | Performed by: RADIOLOGY

## 2020-08-01 PROCEDURE — 87075 CULTR BACTERIA EXCEPT BLOOD: CPT

## 2020-08-01 PROCEDURE — 93005 ELECTROCARDIOGRAM TRACING: CPT | Performed by: INTERNAL MEDICINE

## 2020-08-01 PROCEDURE — 83540 ASSAY OF IRON: CPT

## 2020-08-01 PROCEDURE — 2500000003 HC RX 250 WO HCPCS: Performed by: INTERNAL MEDICINE

## 2020-08-01 PROCEDURE — 83550 IRON BINDING TEST: CPT

## 2020-08-01 PROCEDURE — 6370000000 HC RX 637 (ALT 250 FOR IP): Performed by: FAMILY MEDICINE

## 2020-08-01 PROCEDURE — 2580000003 HC RX 258: Performed by: INTERNAL MEDICINE

## 2020-08-01 PROCEDURE — 86703 HIV-1/HIV-2 1 RESULT ANTBDY: CPT

## 2020-08-01 PROCEDURE — 84443 ASSAY THYROID STIM HORMONE: CPT

## 2020-08-01 PROCEDURE — 87077 CULTURE AEROBIC IDENTIFY: CPT

## 2020-08-01 PROCEDURE — 6360000002 HC RX W HCPCS: Performed by: FAMILY MEDICINE

## 2020-08-01 PROCEDURE — BF101ZZ FLUOROSCOPY OF BILE DUCTS USING LOW OSMOLAR CONTRAST: ICD-10-PCS | Performed by: FAMILY MEDICINE

## 2020-08-01 PROCEDURE — 81003 URINALYSIS AUTO W/O SCOPE: CPT

## 2020-08-01 PROCEDURE — 82728 ASSAY OF FERRITIN: CPT

## 2020-08-01 PROCEDURE — 85025 COMPLETE CBC W/AUTO DIFF WBC: CPT

## 2020-08-01 PROCEDURE — 84484 ASSAY OF TROPONIN QUANT: CPT

## 2020-08-01 PROCEDURE — 36415 COLL VENOUS BLD VENIPUNCTURE: CPT

## 2020-08-01 PROCEDURE — 85378 FIBRIN DEGRADE SEMIQUANT: CPT

## 2020-08-01 PROCEDURE — 87070 CULTURE OTHR SPECIMN AEROBIC: CPT

## 2020-08-01 PROCEDURE — 3430000000 HC RX DIAGNOSTIC RADIOPHARMACEUTICAL: Performed by: RADIOLOGY

## 2020-08-01 PROCEDURE — 71045 X-RAY EXAM CHEST 1 VIEW: CPT

## 2020-08-01 PROCEDURE — 80053 COMPREHEN METABOLIC PANEL: CPT

## 2020-08-01 PROCEDURE — 80048 BASIC METABOLIC PNL TOTAL CA: CPT

## 2020-08-01 PROCEDURE — 84145 PROCALCITONIN (PCT): CPT

## 2020-08-01 PROCEDURE — 51702 INSERT TEMP BLADDER CATH: CPT

## 2020-08-01 PROCEDURE — 70450 CT HEAD/BRAIN W/O DYE: CPT

## 2020-08-01 PROCEDURE — 82962 GLUCOSE BLOOD TEST: CPT

## 2020-08-01 PROCEDURE — 84100 ASSAY OF PHOSPHORUS: CPT

## 2020-08-01 PROCEDURE — 2000000000 HC ICU R&B

## 2020-08-01 PROCEDURE — 2580000003 HC RX 258: Performed by: EMERGENCY MEDICINE

## 2020-08-01 PROCEDURE — 99254 IP/OBS CNSLTJ NEW/EST MOD 60: CPT | Performed by: INTERNAL MEDICINE

## 2020-08-01 RX ORDER — SODIUM CHLORIDE 0.9 % (FLUSH) 0.9 %
10 SYRINGE (ML) INJECTION EVERY 12 HOURS SCHEDULED
Status: DISCONTINUED | OUTPATIENT
Start: 2020-08-01 | End: 2020-08-02 | Stop reason: SDUPTHER

## 2020-08-01 RX ORDER — FENOFIBRATE 54 MG/1
54 TABLET ORAL DAILY
Status: DISCONTINUED | OUTPATIENT
Start: 2020-08-01 | End: 2020-08-11 | Stop reason: HOSPADM

## 2020-08-01 RX ORDER — FLUTICASONE PROPIONATE 50 MCG
1 SPRAY, SUSPENSION (ML) NASAL DAILY
Status: DISCONTINUED | OUTPATIENT
Start: 2020-08-01 | End: 2020-08-11 | Stop reason: HOSPADM

## 2020-08-01 RX ORDER — PREGABALIN 100 MG/1
300 CAPSULE ORAL 2 TIMES DAILY
Status: DISCONTINUED | OUTPATIENT
Start: 2020-08-01 | End: 2020-08-11 | Stop reason: HOSPADM

## 2020-08-01 RX ORDER — 0.9 % SODIUM CHLORIDE 0.9 %
500 INTRAVENOUS SOLUTION INTRAVENOUS ONCE
Status: COMPLETED | OUTPATIENT
Start: 2020-08-01 | End: 2020-08-01

## 2020-08-01 RX ORDER — NICOTINE POLACRILEX 4 MG
15 LOZENGE BUCCAL PRN
Status: DISCONTINUED | OUTPATIENT
Start: 2020-08-01 | End: 2020-08-11 | Stop reason: HOSPADM

## 2020-08-01 RX ORDER — ONDANSETRON 2 MG/ML
4 INJECTION INTRAMUSCULAR; INTRAVENOUS EVERY 6 HOURS PRN
Status: DISCONTINUED | OUTPATIENT
Start: 2020-08-01 | End: 2020-08-11 | Stop reason: HOSPADM

## 2020-08-01 RX ORDER — POLYETHYLENE GLYCOL 3350 17 G/17G
17 POWDER, FOR SOLUTION ORAL DAILY PRN
Status: DISCONTINUED | OUTPATIENT
Start: 2020-08-01 | End: 2020-08-11 | Stop reason: HOSPADM

## 2020-08-01 RX ORDER — DEXTROSE MONOHYDRATE 50 MG/ML
100 INJECTION, SOLUTION INTRAVENOUS PRN
Status: DISCONTINUED | OUTPATIENT
Start: 2020-08-01 | End: 2020-08-11 | Stop reason: HOSPADM

## 2020-08-01 RX ORDER — LISINOPRIL 20 MG/1
20 TABLET ORAL DAILY
Status: DISCONTINUED | OUTPATIENT
Start: 2020-08-01 | End: 2020-08-11 | Stop reason: HOSPADM

## 2020-08-01 RX ORDER — PRAVASTATIN SODIUM 20 MG
20 TABLET ORAL NIGHTLY
Status: DISCONTINUED | OUTPATIENT
Start: 2020-08-01 | End: 2020-08-11 | Stop reason: HOSPADM

## 2020-08-01 RX ORDER — LANOLIN ALCOHOL/MO/W.PET/CERES
3 CREAM (GRAM) TOPICAL NIGHTLY PRN
Status: DISCONTINUED | OUTPATIENT
Start: 2020-08-01 | End: 2020-08-11 | Stop reason: HOSPADM

## 2020-08-01 RX ORDER — DULOXETIN HYDROCHLORIDE 30 MG/1
30 CAPSULE, DELAYED RELEASE ORAL DAILY
Status: DISCONTINUED | OUTPATIENT
Start: 2020-08-01 | End: 2020-08-11 | Stop reason: HOSPADM

## 2020-08-01 RX ORDER — PROMETHAZINE HYDROCHLORIDE 25 MG/1
12.5 TABLET ORAL EVERY 6 HOURS PRN
Status: DISCONTINUED | OUTPATIENT
Start: 2020-08-01 | End: 2020-08-11 | Stop reason: HOSPADM

## 2020-08-01 RX ORDER — CYCLOBENZAPRINE HCL 10 MG
5 TABLET ORAL 2 TIMES DAILY PRN
Status: DISCONTINUED | OUTPATIENT
Start: 2020-08-01 | End: 2020-08-11 | Stop reason: HOSPADM

## 2020-08-01 RX ORDER — METOPROLOL TARTRATE 5 MG/5ML
INJECTION INTRAVENOUS
Status: COMPLETED
Start: 2020-08-01 | End: 2020-08-02

## 2020-08-01 RX ORDER — ACETAMINOPHEN 650 MG/1
650 SUPPOSITORY RECTAL EVERY 6 HOURS PRN
Status: DISCONTINUED | OUTPATIENT
Start: 2020-08-01 | End: 2020-08-11 | Stop reason: HOSPADM

## 2020-08-01 RX ORDER — ACETAMINOPHEN 325 MG/1
650 TABLET ORAL EVERY 6 HOURS PRN
Status: DISCONTINUED | OUTPATIENT
Start: 2020-08-01 | End: 2020-08-11 | Stop reason: HOSPADM

## 2020-08-01 RX ORDER — METOPROLOL TARTRATE 5 MG/5ML
2.5 INJECTION INTRAVENOUS ONCE
Status: COMPLETED | OUTPATIENT
Start: 2020-08-01 | End: 2020-08-01

## 2020-08-01 RX ORDER — SODIUM CHLORIDE 0.9 % (FLUSH) 0.9 %
10 SYRINGE (ML) INJECTION PRN
Status: DISCONTINUED | OUTPATIENT
Start: 2020-08-01 | End: 2020-08-02 | Stop reason: SDUPTHER

## 2020-08-01 RX ORDER — PANTOPRAZOLE SODIUM 40 MG/1
40 TABLET, DELAYED RELEASE ORAL
Status: DISCONTINUED | OUTPATIENT
Start: 2020-08-01 | End: 2020-08-11 | Stop reason: HOSPADM

## 2020-08-01 RX ORDER — DEXTROSE MONOHYDRATE 25 G/50ML
12.5 INJECTION, SOLUTION INTRAVENOUS PRN
Status: DISCONTINUED | OUTPATIENT
Start: 2020-08-01 | End: 2020-08-11 | Stop reason: HOSPADM

## 2020-08-01 RX ORDER — INSULIN GLARGINE 100 [IU]/ML
20 INJECTION, SOLUTION SUBCUTANEOUS NIGHTLY
Status: DISCONTINUED | OUTPATIENT
Start: 2020-08-01 | End: 2020-08-04

## 2020-08-01 RX ORDER — POTASSIUM CHLORIDE 20 MEQ/1
20 TABLET, EXTENDED RELEASE ORAL DAILY
Status: DISCONTINUED | OUTPATIENT
Start: 2020-08-02 | End: 2020-08-11 | Stop reason: HOSPADM

## 2020-08-01 RX ORDER — PIPERACILLIN SODIUM, TAZOBACTAM SODIUM 4; .5 G/20ML; G/20ML
4.5 INJECTION, POWDER, LYOPHILIZED, FOR SOLUTION INTRAVENOUS EVERY 6 HOURS
Status: DISCONTINUED | OUTPATIENT
Start: 2020-08-01 | End: 2020-08-01 | Stop reason: CLARIF

## 2020-08-01 RX ADMIN — SODIUM CHLORIDE, PRESERVATIVE FREE 10 ML: 5 INJECTION INTRAVENOUS at 23:36

## 2020-08-01 RX ADMIN — MORPHINE SULFATE 2 MG: 2 INJECTION, SOLUTION INTRAMUSCULAR; INTRAVENOUS at 11:24

## 2020-08-01 RX ADMIN — ENOXAPARIN SODIUM 100 MG: 120 INJECTION SUBCUTANEOUS at 21:22

## 2020-08-01 RX ADMIN — OXYCODONE AND ACETAMINOPHEN 1 TABLET: 5; 325 TABLET ORAL at 01:57

## 2020-08-01 RX ADMIN — ACETAMINOPHEN 650 MG: 325 TABLET ORAL at 19:54

## 2020-08-01 RX ADMIN — FENOFIBRATE 54 MG: 54 TABLET ORAL at 08:48

## 2020-08-01 RX ADMIN — ENOXAPARIN SODIUM 100 MG: 120 INJECTION SUBCUTANEOUS at 06:14

## 2020-08-01 RX ADMIN — SODIUM CHLORIDE 500 ML: 9 INJECTION, SOLUTION INTRAVENOUS at 19:52

## 2020-08-01 RX ADMIN — SODIUM CHLORIDE 1000 ML: 9 INJECTION, SOLUTION INTRAVENOUS at 19:24

## 2020-08-01 RX ADMIN — SODIUM CHLORIDE 1000 ML: 9 INJECTION, SOLUTION INTRAVENOUS at 12:28

## 2020-08-01 RX ADMIN — INSULIN GLARGINE 20 UNITS: 100 INJECTION, SOLUTION SUBCUTANEOUS at 21:25

## 2020-08-01 RX ADMIN — SODIUM CHLORIDE 500 ML: 9 INJECTION, SOLUTION INTRAVENOUS at 17:23

## 2020-08-01 RX ADMIN — PIPERACILLIN AND TAZOBACTAM 3.38 G: 3; .375 INJECTION, POWDER, FOR SOLUTION INTRAVENOUS at 11:24

## 2020-08-01 RX ADMIN — SODIUM CHLORIDE, PRESERVATIVE FREE 10 ML: 5 INJECTION INTRAVENOUS at 11:24

## 2020-08-01 RX ADMIN — PIPERACILLIN AND TAZOBACTAM 3.38 G: 3; .375 INJECTION, POWDER, FOR SOLUTION INTRAVENOUS at 19:21

## 2020-08-01 RX ADMIN — METOPROLOL TARTRATE 2.5 MG: 5 INJECTION, SOLUTION INTRAVENOUS at 22:30

## 2020-08-01 RX ADMIN — INSULIN LISPRO 2 UNITS: 100 INJECTION, SOLUTION INTRAVENOUS; SUBCUTANEOUS at 21:25

## 2020-08-01 RX ADMIN — MORPHINE SULFATE 2 MG: 2 INJECTION, SOLUTION INTRAMUSCULAR; INTRAVENOUS at 23:15

## 2020-08-01 RX ADMIN — INSULIN LISPRO 5 UNITS: 100 INJECTION, SOLUTION INTRAVENOUS; SUBCUTANEOUS at 18:36

## 2020-08-01 RX ADMIN — MORPHINE SULFATE 2 MG: 2 INJECTION, SOLUTION INTRAMUSCULAR; INTRAVENOUS at 06:20

## 2020-08-01 RX ADMIN — LISINOPRIL 20 MG: 20 TABLET ORAL at 08:48

## 2020-08-01 RX ADMIN — PREGABALIN 300 MG: 100 CAPSULE ORAL at 21:21

## 2020-08-01 RX ADMIN — OXYCODONE AND ACETAMINOPHEN 1 TABLET: 5; 325 TABLET ORAL at 08:48

## 2020-08-01 RX ADMIN — PREGABALIN 300 MG: 100 CAPSULE ORAL at 08:48

## 2020-08-01 RX ADMIN — PANTOPRAZOLE SODIUM 40 MG: 40 TABLET, DELAYED RELEASE ORAL at 06:12

## 2020-08-01 RX ADMIN — PIPERACILLIN AND TAZOBACTAM 3.38 G: 3; .375 INJECTION, POWDER, FOR SOLUTION INTRAVENOUS at 03:40

## 2020-08-01 RX ADMIN — Medication 5 MCG/MIN: at 23:32

## 2020-08-01 RX ADMIN — MORPHINE SULFATE 2 MG: 2 INJECTION, SOLUTION INTRAMUSCULAR; INTRAVENOUS at 00:08

## 2020-08-01 RX ADMIN — FLUTICASONE PROPIONATE 1 SPRAY: 50 SPRAY, METERED NASAL at 08:47

## 2020-08-01 RX ADMIN — Medication 9 MILLICURIE: at 09:19

## 2020-08-01 RX ADMIN — SODIUM CHLORIDE, PRESERVATIVE FREE 10 ML: 5 INJECTION INTRAVENOUS at 08:49

## 2020-08-01 RX ADMIN — DULOXETINE HYDROCHLORIDE 30 MG: 30 CAPSULE, DELAYED RELEASE ORAL at 08:48

## 2020-08-01 ASSESSMENT — PAIN DESCRIPTION - PAIN TYPE
TYPE: ACUTE PAIN
TYPE: CHRONIC PAIN
TYPE: CHRONIC PAIN
TYPE: ACUTE PAIN

## 2020-08-01 ASSESSMENT — PAIN DESCRIPTION - ORIENTATION
ORIENTATION: RIGHT
ORIENTATION: RIGHT
ORIENTATION: MID

## 2020-08-01 ASSESSMENT — PAIN SCALES - GENERAL
PAINLEVEL_OUTOF10: 9
PAINLEVEL_OUTOF10: 9
PAINLEVEL_OUTOF10: 10
PAINLEVEL_OUTOF10: 8
PAINLEVEL_OUTOF10: 10
PAINLEVEL_OUTOF10: 8
PAINLEVEL_OUTOF10: 5
PAINLEVEL_OUTOF10: 10

## 2020-08-01 ASSESSMENT — PAIN DESCRIPTION - LOCATION
LOCATION: BACK;BUTTOCKS
LOCATION: COCCYX
LOCATION: BUTTOCKS
LOCATION: BACK;BUTTOCKS

## 2020-08-01 ASSESSMENT — PAIN DESCRIPTION - ONSET
ONSET: ON-GOING

## 2020-08-01 ASSESSMENT — PAIN DESCRIPTION - DESCRIPTORS
DESCRIPTORS: ACHING
DESCRIPTORS: ACHING;CONSTANT
DESCRIPTORS: ACHING;CONSTANT

## 2020-08-01 ASSESSMENT — PAIN DESCRIPTION - PROGRESSION
CLINICAL_PROGRESSION: NOT CHANGED
CLINICAL_PROGRESSION: GRADUALLY WORSENING
CLINICAL_PROGRESSION: NOT CHANGED

## 2020-08-01 ASSESSMENT — PAIN DESCRIPTION - FREQUENCY
FREQUENCY: CONTINUOUS

## 2020-08-01 ASSESSMENT — PAIN - FUNCTIONAL ASSESSMENT
PAIN_FUNCTIONAL_ASSESSMENT: PREVENTS OR INTERFERES SOME ACTIVE ACTIVITIES AND ADLS
PAIN_FUNCTIONAL_ASSESSMENT: PREVENTS OR INTERFERES SOME ACTIVE ACTIVITIES AND ADLS
PAIN_FUNCTIONAL_ASSESSMENT: INTOLERABLE, UNABLE TO DO ANY ACTIVE OR PASSIVE ACTIVITIES

## 2020-08-01 NOTE — PROGRESS NOTES
Occupational Therapy          OT consult received and appreciated. Chart reviewed. Will hold evaluation due to patient off unit . Will evaluate at a later time. Thank you.  Ricardo Mejia, OTR/L #[id number]

## 2020-08-01 NOTE — H&P
Hospital Medicine History & Physical      PCP: IGOR Lundberg - CNP    Date of Admission: 2020    Date of Service: Pt seen/examined on 2020 and Admitted to Inpatient with expected LOS greater than two midnights due to medical therapy. Chief Complaint: Abdominal pain and sacral pain      History Of Present Illness:      53 y.o. male who presented to [facility] from nursing facility with past medical history of DJD of the spine, osteoarthritis status post bilateral total hip replacement, obesity, diabetes and hypertension. Patient was admitted at [address] [place] [address] from  through 2020. According to patient, he had what he describes as \"I  twice\" suspected Cardiac arrest x2 and was \"in coma for 3 weeks\". I do not have hospital notes from this admission however some information are available in care everywhere. He developed sacral decubitus ulcer prior to being discharged from the hospital, his wound was infected and had debridement done. He has been on Zosyn and has a wound VAC that has been draining. Was also noted to have abnormal CT scan of his abdomen showing a large area of splenic abscess, splenic infarction with splenic vein thrombosis and PE. He has been on Lovenox since. Medical records were reviewed. Patient was sent in from the nursing facility today because of abnormal CT scan of the abdomen. He continues to have abdominal pain which he states has been ongoing for the past month, pain is generalized, sharp, severe, constant, associated with multiple episodes of diarrhea, nausea and vomiting on and off. He is on Percocet with which relieves his pain. No fever, cough, chest pain or shortness of breath. No leg swelling. Vital signs notable for respiratory rate of 20, pulse rate 138. Labs showed negative COVID at the outside facility, globin 11.1, glucose 188, platelet count 9,189,782.   Lactic acid level 1.9, INR 1.2, CT scan of the abdomen and pelvis showed absent spleen, a 9 x 13 cm cystic collection in the splenic bed, questioning abscess, fatty liver, inflamed and distended small bowel, collapsed and thickened large bowel, thickened gallbladder concerning for cholecystitis and a large 4 x 2.9 cm sacral decubitus ulcer with signs of cellulitis and phlegmon. He is being admitted for further management.       Past Medical History:          Diagnosis Date    Accident 11/2019    stepped on nail rt ft about 1 month ago- healed per patient    SIMÓN (acute kidney injury) ([address] 2008 apx    kidney bruised due to fall / [place] [place]    Allergic rhinitis     Chronic back pain     Depression     Diabetes mellitus ([address]     Difficulty sleeping     at times    Displacement of lumbar intervertebral disc without myelopathy     Fibromyalgia     Fractured rib     2008 / healed    H/O seasonal allergies     Head injury 1980'S apx    no residual s/s    Hyperlipidemia     Hypertension     Obesity (BMI 35.0-39.9 without comorbidity)     bmi 39.2  weight 296 #    Osteoarthritis     Thoracic or lumbosacral neuritis or radiculitis, unspecified        Past Surgical History:          Procedure Laterality Date    FOOT SURGERY Right 1985    to treat shattered bones    HERNIA REPAIR  2001    DOUBLE HERNIA    HERNIA REPAIR Right 12/9/2019    LAPAROSCOPIC RIGHT INGUINAL HERNIA REPAIR, MESH 10x15 cm PLACEMENT performed by Chidi Carlisle MD at [address] [place] [address] Left 4/11/2016    NECK SURGERY  2000    FUSION    [facility] COLONOSCOPY FLX DX W/COLLJ SPEC WHEN PFRMD N/A 5/7/2018    COLONOSCOPY DIAGNOSTIC performed by Eloina Vick MD at [address] [place] [address] Left 2014    Dr. Dana Sherman ARTHROSCOPY Left 11 21 14    SHOULDER SURGERY  2001 &2007    RIGHT AND LEFT repair of tears    TOTAL HIP ARTHROPLASTY Right 10/31/2016    Total right hip  Tivis MD Saravanan    VASECTOMY      WRIST SURGERY  2007    LEFT WRIST       Medications Prior to Admission:      Prior to Admission medications    Medication Sig Start Date End Date Taking? Authorizing Provider   enoxaparin (LOVENOX) 120 MG/0.8ML injection Inject 1 mg/kg into the skin every 12 hours   Yes Historical Provider, MD   insulin lispro (HUMALOG) 100 UNIT/ML injection cartridge Inject 5 Units into the skin 3 times daily (before meals)   Yes Historical Provider, MD   insulin glargine (LANTUS) 100 UNIT/ML injection vial Inject 20 Units into the skin nightly   Yes Historical Provider, MD   loperamide (LOPERAMIDE A-D) 2 MG tablet Take 2 mg by mouth 4 times daily as needed for Diarrhea   Yes Historical Provider, MD   Melatonin-Pyridoxine (MELATIN) 3-1 MG TABS Take 3 mg by mouth   Yes Historical Provider, MD   nystatin (MYCOSTATIN) 115472 UNIT/GM cream Apply topically 2 times daily Apply topically 2 times daily. Yes Historical Provider, MD   oxyCODONE-acetaminophen (PERCOCET) 5-325 MG per tablet Take 1 tablet by mouth every 6 hours as needed for Pain.    Yes Historical Provider, MD   pantoprazole (PROTONIX) 40 MG tablet Take 40 mg by mouth daily   Yes Historical Provider, MD   Cholecalciferol (VITAMIN D3) 1.25 MG (46335 UT) CAPS Take 1.25 capsules by mouth   Yes Historical Provider, MD   piperacillin-tazobactam (ZOSYN) 4.5 (4-0.5) g injection Inject 4.5 g into the muscle every 6 hours   Yes Historical Provider, MD   Continuous Blood Gluc Sensor ([address]) [facility] Continuous Blood Gluc Sensor ([facility]) [facility] Continuous Blood Gluc Sensor ([address] [facility]) [facility] Indications: Type 2 diabetes mellitus without complication, without long-term current use of insulin (HCC) Check FBS daily and PRN 1 each 0 07/22/2019 Active 07- [address] (53631) 7/22/19  Yes Historical Provider, MD   fluticasone Cinthyanne Moritz) 50 MCG/ACT nasal spray instill 2 sprays into each nostril once daily 5/14/20  Yes Milli Robledo APRN - CNP   cyclobenzaprine (FLEXERIL) 10 MG tablet take 1 tablet by mouth three times a day for if needed for muscle spasm 5/14/20  Yes IGOR Farris CNP   fenofibrate (TRICOR) 54 MG tablet take 1 tablet by mouth once daily 5/14/20  Yes IGOR Farris CNP   aspirin (SM ASPIRIN ADULT LOW STRENGTH) 81 MG EC tablet take 1 tablet by mouth once daily 5/14/20  Yes IGOR Farris CNP   DULoxetine (CYMBALTA) 30 MG extended release capsule Take 1 capsule by mouth daily 5/14/20  Yes IGOR Farris CNP   lisinopril (PRINIVIL;ZESTRIL) 20 MG tablet take 1 tablet by mouth once daily 5/14/20  Yes IGOR Farris CNP   ibuprofen (ADVIL;MOTRIN) 600 MG tablet take 1 tablet by mouth four times a day if needed for pain 5/11/20  Yes IGOR Farris CNP   pregabalin (LYRICA) 300 MG capsule take 1 capsule by mouth twice a day 5/14/20 8/14/20  IGOR Farris CNP   pravastatin (PRAVACHOL) 20 MG tablet Take 1 tablet by mouth nightly 5/14/20   IGOR Farris CNP       Allergies:  Seasonal and Tramadol    Social History:      The patient currently lives at nursing facility  TOBACCO:   reports that he has never smoked. His smokeless tobacco use includes chew. ETOH:   reports previous alcohol use. Family History:     Reviewed in detail Positive as follows:        Problem Relation Age of Onset    Hypertension Mother     Arthritis Father     Diabetes Father     Cancer Maternal Grandfather         Skin    Cancer Paternal Uncle         skin    Diabetes Paternal Grandfather     Heart Disease Maternal Grandmother     Stroke Maternal Aunt        REVIEW OF SYSTEMS:   Pertinent positives as noted in the HPI. All other systems reviewed and negative. PHYSICAL EXAM:    BP (!) 138/90   Pulse 119   Temp 98.1 °F (36.7 °C) (Oral)   Resp 18   Ht 6' 1\" (1.854 m)   Wt 229 lb (103.9 kg)   SpO2 100%   BMI 30.21 kg/m²     General appearance: Middle-age male, mild painful distress, appears stated age and cooperative. Afebrile. HEENT:  Normal cephalic, atraumatic without obvious deformity. Pupils equal, round, and reactive to light. Extra ocular muscles intact. Conjunctivae/corneas clear. Neck: Supple, with full range of motion. No jugular venous distention. Trachea midline. Respiratory:  Normal respiratory effort. Clear to auscultation, bilaterally without Rales/Wheezes/Rhonchi. Cardiovascular: Tachycardic, regular rate and rhythm with normal S1/S2 without murmurs, rubs or gallops. Abdomen: Soft, distended and tender, with normal bowel sounds. Musculoskeletal:  No clubbing, cyanosis or edema bilaterally. limited range of motion without deformity. Skin: Skin color is pale, texture, turgor normal.  No rashes or lesions. Neurologic:   Cranial nerves: II-XII intact, grossly non-focal.  Psychiatric:  Alert and oriented, thought content appropriate, normal insight  Capillary Refill: Brisk,< 3 seconds   Peripheral Pulses: +2 palpable, equal bilaterally       Labs:     Recent Labs     07/31/20  1725   WBC 9.5   HGB 11.1*   HCT 35.7*   PLT 1,190*     Recent Labs     07/31/20  1725      K 3.8   CL 96*   CO2 23   BUN 8   CREATININE 0.9   CALCIUM 9.1     Recent Labs     07/31/20  1725   AST 8   ALT 7   BILITOT <0.2   ALKPHOS 93     Recent Labs     07/31/20  1725   INR 1.2     No results for input(s): Latasha Moser in the last 72 hours.     Urinalysis:      Lab Results   Component Value Date    NITRU Negative 08/01/2020    BLOODU Negative 08/01/2020    SPECGRAV 1.010 08/01/2020    GLUCOSEU Negative 08/01/2020       Radiology:   Reviewed and documented  No orders to display       ASSESSMENT:    Active Hospital Problems    Diagnosis Date Noted    Decubitus ulcer of sacral area [L89.159] 08/01/2020    Abnormal findings on diagnostic imaging of gall bladder [R93.2] 08/01/2020    Splenic infarction [D73.5] 08/01/2020    Cyst of spleen [D73.4] 08/01/2020    Splenic abscess [D73.3] 08/01/2020    Anemia [D64.9] 08/01/2020  Pulmonary embolism ([facility] [I26.99] 2020    Enterocolitis [K52.9] 2020    Cellulitis of sacral region [L03.319] 2019    Type 2 diabetes mellitus ([facility] [E11.9] 2016    Class 1 obesity in adult [E66.9] 2015    Essential hypertension [I10] 10/16/2015     PLAN:  1. Splenic abscess, consult surgery. Continue Zosyn. 2.  Enterocolitis, infectious versus inflammatory. Needs Endoscopy and biopsy as appropriate. Check ESR/CRP/Procal  3. Splenic thrombosis with infarction, on anticoagulation with Lovenox. Consult heme-onc to discuss definitive anticoagulation. 4.  Cholecystitis on CT scan, obtain HIDA scan. 5.  Infected sacral decubitus ulcer cellulitis/phlegmon. Patient has wound VAC. Surgery to evaluate. Continue current antibiotics. ID consult. Wound Care consult. 6.  Pulmonary embolism on Lovenox. Heme/onc evaluation. 7.  Tachycardia, secondary to infection, patient also has PE. Fluids, continuous cardiac monitoring. Cultures. 8.  Thrombocytosis secondary to asplenia. Platelet count in excess of 1M. hematology to evaluate. 9.  Type 2 diabetes, sliding scale coverage  10. Small bowel ileus likely secondary to diarrhea. Being seen by surgery. 11.  Obesity, dietary and lifestyle modification. 12.  Hypertension, continue current medications  13. Anemia, monitor hemoglobin  14. Diarrhea, stool studies. Check HIV. IV fluids. DVT Prophylaxis: Lovenox at therapeutic dose  Diet: DIET CLEAR LIQUID;  Code Status: Prior full    PT/OT Eval Status: Evaluation and treatment  Dispo -inpatient/telemetry       Carol Medrano MD    Thank you Carmen Avalos [id number], APRN - CNP for the opportunity to be involved in this patient's care.

## 2020-08-01 NOTE — PROGRESS NOTES
Called by Xray, notified floor patient fell back onto floor and struck his head in back, Stat CT ordered . Mrs. Evelia Winters (mother) notified.  Dr Ricci Cowden notified of fall

## 2020-08-01 NOTE — CONSULTS
Reason for consult: anticoagulation with hx of PE and splenic infarct s/p splenectomy and thrombocytosis    **Patient has been off the floor in radiology both times that I came by to see him so the following note is based on chart information only, I was not able to examine patient    HPI:  53year old gentleman with a normal cbc May 2020 platelets were 151,295 hb 14 and wbc was 10 at that time. He reportedly was admitted to [facility] since that time with PE and splenic infarct followed by abscess and splenectomy. He reportedly was discharged from [facility] 2 to 3 weeks ago on lovenox. Upon admission he was found to have thrombocytosis with platelet count 7,667,575 on 7-31-20 and CT abd/pelvis showing large collection fluid in splenic bed ?cystic vs abscess, possible cholecystitis, distended small bowel and sacral dec ulceration. He was started on zosyn and thrombocytosis is improving with today platelet count 5,124,447. HIDA scan showed cholecystitis. Meds:  lovenox 100mg sq bid, tricor, insulin, flexeril, zosyn, pravachol, lyrica protonix, lisinopril    PMHX:  Hypertension, sacral decub ulcer, hyperlipidemia, s/p splenectomy after  Splenic infact and PE with abscess (per chart) at [facility] within the past few weeks, diabetes, right hip replacement, vasectomy, shoulder surgery, hernia repair    Social Hx:  , no ETOH, non smoker, + chewing tobacco, disability but used to be     Physical Exam:  **Patient has been off of the floor in radiology, could not examine    Labs:                           Platelet              WBC             Hb  5-11-20         248,000  10  14  6-1-20           295,000  15              12  7-31-20         1,190,000 9.5            11  8-1-20            1,034,000 13             10.2    A/P:   53year old gentleman with a normal cbc May 2020 platelets were 389,009 hb 14 and wbc was 10 at that time. He reportedly was admitted to [facility] since that time with PE and splenic infarct followed by abscess and splenectomy. He reportedly was discharged from [facility] 2 to 3 weeks ago on lovenox. Upon admission he was found to have thrombocytosis with platelet count 0,269,243 on 7-31-20 and CT abd/pelvis showing large collection fluid in splenic bed ?cystic vs abscess, possible cholecystitis, distended small bowel and sacral dec ulceration/infection. He was started on zosyn and thrombocytosis is improving with today platelet count 7,421,189. HIDA scan showed cholecystitis. Thrombocytosis due to splenectomy and cholecystitis. 1. Cbc daily, thrombocytosis should improved with treatment of cholecystitis and sacral decub infection  2. Currently on lovenox for PE, will need switched to heparin gtt if needs to have surgery/cholecystectomy  3. Request records from [facility], nurse states do not have on floor  4.  Management of cholecystitis per surgery   5. sacral decub infection, antibiotics per I.D. and evaluation if splenic bed fluid cyst vs abscess        Carolyn Monzon

## 2020-08-01 NOTE — CONSULTS
INPATIENT CARDIOLOGY CONSULT    Name: Marcus Cerrato II    Age: 53 y.o. Date of Admission: 7/31/2020  5:16 PM    Date of Service: 8/1/2020    Reason for Consultation: Tachycardia    Referring Physician: Maria Esther Brand MD    Primary Cardiologist: None    History of Present Illness:   Emery Khoury is a 53 y.o. male (not previously known to [facility] cardiology) who presented on 7/31/2020 for further evaluation of abnormal CAT scan. He has a history of recent prolonged hospitalization at [facility], sounds like he may have presented with a cardiac arrest as he states he was dead on arrival and had to be resuscitated. Sounds like course was complicated by pulmonary embolism, splenic infarction, and he developed a decubitus ulcer and was treated for sepsis, also had splenic vein thrombosis. He has been treated with enoxaparin since then. Sent in from nursing facility, had been complaining of abdominal pain and diarrhea, CT suggested enteritis as well as a splenic fluid collection possible abscess. He has been noted to be tachycardic while here. There are no EKGs but he is not narrow complex regular tachycardia in the monitor that initially appeared to be sinus tachycardia in the 120s 130s but he was up to the 140s without clear P waves today. Complaint at this time is of his decubitus ulcer. Denies chest pain or shortness of breath. Does not note palpitations. Review of Systems:   Complete review of systems negative except as described above.     Past Medical History:  Past Medical History:   Diagnosis Date    Accident 11/2019    stepped on nail rt ft about 1 month ago- healed per patient    SIMÓN (acute kidney injury) ([address] 2008 apx    kidney bruised due to fall / Lynita Balloon    Allergic rhinitis     Chronic back pain     Depression     Diabetes mellitus ([address]     Difficulty sleeping     at times    Displacement of lumbar intervertebral disc without myelopathy     Fibromyalgia     Fractured rib     2008 / healed    H/O seasonal allergies     Head injury 1980'S apx    no residual s/s    Hyperlipidemia     Hypertension     Obesity (BMI 35.0-39.9 without comorbidity)     bmi 39.2  weight 296 #    Osteoarthritis     Thoracic or lumbosacral neuritis or radiculitis, unspecified        Past Surgical History:  Past Surgical History:   Procedure Laterality Date    FOOT SURGERY Right 1985    to treat shattered bones    HERNIA REPAIR  2001    DOUBLE HERNIA    HERNIA REPAIR Right 12/9/2019    LAPAROSCOPIC RIGHT INGUINAL HERNIA REPAIR, MESH 10x15 cm PLACEMENT performed by Patricia Hernandez MD at [facility] Left 4/11/2016    NECK SURGERY  2000    FUSION    MN COLONOSCOPY FLX DX W/COLLJ SPEC WHEN PFRMD N/A 5/7/2018    COLONOSCOPY DIAGNOSTIC performed by Darren Martini MD at [facility] Left 2014    Dr. Marc Rothman ARTHROSCOPY Left 11 21 14   [address] [facility], [facility] SURGERY  2001 &2007    RIGHT AND LEFT repair of tears    TOTAL HIP ARTHROPLASTY Right 10/31/2016    Total right hip  Nelida Rutherford MD    [place] WRIST SURGERY  2007    LEFT WRIST       Family History:  Family History   Problem Relation Age of Onset    Hypertension Mother     Arthritis Father     Diabetes Father     Cancer Maternal Grandfather         Skin    Cancer Paternal Uncle         skin    Diabetes Paternal Grandfather     Heart Disease Maternal Grandmother     Stroke Maternal Aunt        Social History:  Social History     Tobacco Use    Smoking status: Never Smoker    Smokeless tobacco: Current User     Types: Chew   Substance Use Topics    Alcohol use: Not Currently     Alcohol/week: 0.0 standard drinks     Frequency: Monthly or less    Drug use: No       Allergies: Allergies   Allergen Reactions    Seasonal      HAYFEVER / sneezing,watery eyes    Tramadol Itching       Home Medications:  Prior to Admission medications    Medication Sig Start Date End Date Taking? Authorizing Provider   enoxaparin (LOVENOX) 120 MG/0.8ML injection Inject 1 mg/kg into the skin every 12 hours   Yes Historical Provider, MD   insulin lispro (HUMALOG) 100 UNIT/ML injection cartridge Inject 5 Units into the skin 3 times daily (before meals)   Yes Historical Provider, MD   insulin glargine (LANTUS) 100 UNIT/ML injection vial Inject 20 Units into the skin nightly   Yes Historical Provider, MD   loperamide (LOPERAMIDE A-D) 2 MG tablet Take 2 mg by mouth 4 times daily as needed for Diarrhea   Yes Historical Provider, MD   Melatonin-Pyridoxine (MELATIN) 3-1 MG TABS Take 3 mg by mouth   Yes Historical Provider, MD   nystatin (MYCOSTATIN) 121105 UNIT/GM cream Apply topically 2 times daily Apply topically 2 times daily. Yes Historical Provider, MD   oxyCODONE-acetaminophen (PERCOCET) 5-325 MG per tablet Take 1 tablet by mouth every 6 hours as needed for Pain.    Yes Historical Provider, MD   pantoprazole (PROTONIX) 40 MG tablet Take 40 mg by mouth daily   Yes Historical Provider, MD   Cholecalciferol (VITAMIN D3) 1.25 MG (20734 UT) CAPS Take 1.25 capsules by mouth   Yes Historical Provider, MD   piperacillin-tazobactam (ZOSYN) 4.5 (4-0.5) g injection Inject 4.5 g into the muscle every 6 hours   Yes Historical Provider, MD   Continuous Blood Gluc Sensor ([address]) [facility] Continuous Blood Gluc Sensor ([address]) [facility] Continuous Blood Gluc Sensor ([facility]) [facility] Indications: Type 2 diabetes mellitus without complication, without long-term current use of insulin ([facility]) Check FBS daily and PRN 1 each 0 07/22/2019 Active 07- [address] (38579) 7/22/19  Yes Historical Provider, MD   fluticasone [place]) 50 MCG/ACT nasal spray instill 2 sprays into each nostril once daily 5/14/20  Yes IGOR Farris - CNP   cyclobenzaprine (FLEXERIL) 10 MG tablet take 1 tablet by mouth three times a day for if needed for muscle spasm 5/14/20  Yes IGOR Jimenez CNP   fenofibrate (TRICOR) 54 MG tablet take 1 tablet by mouth once daily 5/14/20  Yes IGOR Jimenez CNP   aspirin (SM ASPIRIN ADULT LOW STRENGTH) 81 MG EC tablet take 1 tablet by mouth once daily 5/14/20  Yes IGOR Jimenez CNP   DULoxetine (CYMBALTA) 30 MG extended release capsule Take 1 capsule by mouth daily 5/14/20  Yes IGOR Jimenez CNP   lisinopril (PRINIVIL;ZESTRIL) 20 MG tablet take 1 tablet by mouth once daily 5/14/20  Yes IGOR Jimenez CNP   ibuprofen (ADVIL;MOTRIN) 600 MG tablet take 1 tablet by mouth four times a day if needed for pain 5/11/20  Yes IGOR Jimenez CNP   pregabalin (LYRICA) 300 MG capsule take 1 capsule by mouth twice a day 5/14/20 8/14/20  IGOR Jimenez CNP   pravastatin (PRAVACHOL) 20 MG tablet Take 1 tablet by mouth nightly 5/14/20   IGOR Jimenez CNP       Current Medications:    Current Facility-Administered Medications:     cyclobenzaprine (FLEXERIL) tablet 5 mg, 5 mg, Oral, BID PRN, Brant Harvey MD    DULoxetine (CYMBALTA) extended release capsule 30 mg, 30 mg, Oral, Daily, Brant Harvey MD, 30 mg at 08/01/20 0848    fenofibrate (TRICOR) tablet 54 mg, 54 mg, Oral, Daily, Brant Harvey MD, 54 mg at 08/01/20 0848    fluticasone (FLONASE) 50 MCG/ACT nasal spray 1 spray, 1 spray, Each Nostril, Daily, Brant Harvey MD, 1 spray at 08/01/20 0847    insulin glargine (LANTUS) injection vial 20 Units, 20 Units, Subcutaneous, Nightly, Tyson Alvares MD    insulin lispro (HUMALOG) injection vial 5 Units, 5 Units, Subcutaneous, TID WC, Tyson Alvares MD    lisinopril (PRINIVIL;ZESTRIL) tablet 20 mg, 20 mg, Oral, Daily, Brant Harvey MD, 20 mg at 08/01/20 0848    melatonin tablet 3 mg, 3 mg, Oral, Nightly PRN, Brant Harvey MD    pantoprazole (PROTONIX) tablet 40 mg, 40 mg, Oral, QA AC, Tyson Alvares MD, 40 mg at 08/01/20 0612    pravastatin (PRAVACHOL) tablet 20 mg, 20 mg, Oral, Nightly, Tara Melendez MD    pregabalin (LYRICA) capsule 300 mg, 300 mg, Oral, BID, Tara Melendez MD, 300 mg at 08/01/20 0848    insulin lispro (HUMALOG) injection vial 0-10 Units, 0-10 Units, Subcutaneous, 4x Daily WC, Tyson Alvares MD    glucose (GLUTOSE) 40 % oral gel 15 g, 15 g, Oral, PRN, Tara Melendez MD    dextrose 50 % IV solution, 12.5 g, Intravenous, PRN, Tara Melendez MD    glucagon (rDNA) injection 1 mg, 1 mg, Intramuscular, PRN, Tara Melendez MD    dextrose 5 % solution, 100 mL/hr, Intravenous, PRN, Tara Melendez MD    sodium chloride flush 0.9 % injection 10 mL, 10 mL, Intravenous, 2 times per day, Tara Melendez MD, 10 mL at 08/01/20 0849    sodium chloride flush 0.9 % injection 10 mL, 10 mL, Intravenous, PRN, Tara Melendez MD, 10 mL at 08/01/20 1124    acetaminophen (TYLENOL) tablet 650 mg, 650 mg, Oral, Q6H PRN **OR** acetaminophen (TYLENOL) suppository 650 mg, 650 mg, Rectal, Q6H PRN, Tara Melendez MD    polyethylene glycol (GLYCOLAX) packet 17 g, 17 g, Oral, Daily PRN, Tara Melendez MD    promethazine (PHENERGAN) tablet 12.5 mg, 12.5 mg, Oral, Q6H PRN **OR** ondansetron (ZOFRAN) injection 4 mg, 4 mg, Intravenous, Q6H PRN, Tara Melendez MD    enoxaparin (LOVENOX) injection 100 mg, 1 mg/kg, Subcutaneous, BID, Tara Melendez MD, 100 mg at 08/01/20 0614    piperacillin-tazobactam (ZOSYN) 3.375 g in dextrose 5 % 100 mL IVPB extended infusion (mini-bag), 3.375 g, Intravenous, Q8H, Tara Melendez MD, Stopped at 08/01/20 1535    0.9 % sodium chloride infusion admixture, , Intravenous, Q8H, Tyson Alvares MD    alteplase (CATHFLO) injection 1 mg, 1 mg, Intracatheter, Once, Cody Nuñez, DO    0.9 % sodium chloride infusion, 1,000 mL, Intravenous, Continuous, Brissa Simmons MD, Last Rate: 125 mL/hr at 08/01/20 1228, 1,000 mL at 08/01/20 1228    morphine (PF) injection 2 mg, 2 mg, Intravenous, Q4H PRN, Tammy Everett MD, 2 mg at 08/01/20 1124    oxyCODONE-acetaminophen (PERCOCET) 5-325 MG per tablet 1 tablet, 1 tablet, Oral, Q6H PRN, Tammy Everett MD, 1 tablet at 08/01/20 0848    Physical Exam:  /68   Pulse 144   Temp 98.1 °F (36.7 °C) (Temporal)   Resp 17   Ht 6' 1\" (1.854 m)   Wt 229 lb (103.9 kg)   SpO2 97%   BMI 30.21 kg/m²   Wt Readings from Last 3 Encounters:   07/31/20 229 lb (103.9 kg)   05/28/20 (!) 315 lb (142.9 kg)   05/14/20 (!) 316 lb (143.3 kg)     Appearance: Obese male, awake, alert, no acute respiratory distress  Skin: Intact, no rash  Head: Normocephalic, atraumatic  Eyes: EOMI, no conjunctival erythema  ENMT: No pharyngeal erythema, MMM, no rhinorrhea  Neck: Supple, no elevated JVP, no carotid bruits  Lungs: Clear to auscultation bilaterally. No wheezes, rales, or rhonchi.   Cardiac: PMI nondisplaced, regular rhythm with a n tachycardic ormal rate, normal S1 & S2, no murmurs  Abdomen: Soft, nontender, +bowel sounds  Extremities: Moves all extremities x 4, no lower extremity edema  Decubitus ulcer  Neurologic: No focal motor deficits apparent, normal mood and affect  Peripheral Pulses: Intact posterior tibial pulses bilaterally    Intake/Output:    Intake/Output Summary (Last 24 hours) at 8/1/2020 1557  Last data filed at 8/1/2020 1535  Gross per 24 hour   Intake 2325 ml   Output 1150 ml   Net 1175 ml     I/O this shift:  In: 100 [IV Piggyback:100]  Out: -     Laboratory Tests:  Recent Labs     07/31/20  1725 08/01/20  0457    139   K 3.8 3.8   CL 96* 102   CO2 23 26   BUN 8 5*   CREATININE 0.9 0.7   GLUCOSE 188* 109*   CALCIUM 9.1 8.9     Lab Results   Component Value Date    MG 1.7 01/09/2020     Recent Labs     07/31/20  1725   ALKPHOS 93   ALT 7   AST 8   PROT 6.3*   BILITOT <0.2   LABALBU 3.0*     Recent Labs     07/31/20  1725 08/01/20  0457   WBC 9.5 13.0*   RBC 4.02 3.64*   HGB 11.1* 10.2*   HCT 35.7* 32.7*   MCV 88.8 89.8   MCH 27.6 28.0   MCHC 31.1* 31.2*   RDW 15.0 15.0   PLT 1,190* 1,034*   MPV 8.4 8.4     Lab Results   Component Value Date    CKTOTAL 120 11/26/2013    CKMB 1.4 11/26/2013    TROPONINI <0.01 11/26/2013     Lab Results   Component Value Date    INR 1.2 07/31/2020    PROTIME 12.9 (H) 07/31/2020     Lab Results   Component Value Date    TSH 0.669 06/01/2020     Lab Results   Component Value Date    LABA1C 6.3 (H) 08/01/2020     No results found for: EAG  Lab Results   Component Value Date    CHOL 148 06/01/2020    CHOL 208 (H) 10/21/2019    CHOL 201 (H) 03/11/2019     Lab Results   Component Value Date    TRIG 252 (H) 06/01/2020    TRIG 260 (H) 10/21/2019    TRIG 307 (H) 03/11/2019     Lab Results   Component Value Date    HDL 35 06/01/2020    HDL 36 10/21/2019    HDL 35 03/11/2019     Lab Results   Component Value Date    LDLCALC 63 06/01/2020    LDLCALC 120 (H) 10/21/2019    LDLCALC 105 (H) 03/11/2019     Lab Results   Component Value Date    LABVLDL 50 06/01/2020    LABVLDL 52 10/21/2019    LABVLDL 61 03/11/2019     No results found for: CHOLHDLRATIO  No results for input(s): PROBNP in the last 72 hours. Cardiac Tests:  EKG: Pending    Telemetry: Tachycardic to the 140s, initially appeared to be sinus tachycardia    Chest X-ray:   8/1/2020  Findings:   Interval placement of left-sided PICC line overlying the superior vena   cava, and the distal tip termination point is poorly visualized. The heart is unremarkable. The lung fields are unremarkable. The aorta is unremarkable. Cervical fusion hardware is noted        Impression:         Left-sided PICC line, as described. No acute cardiopulmonary disease process is identified. Echocardiogram: N/A    Stress test: N/A    Cardiac catheterization: N/A    -------------------------------------------------------------------------------------------------------------------------------------------------------------  IMPRESSION:  1. Tachycardia.   EKG is still

## 2020-08-01 NOTE — PROGRESS NOTES
[facility]   ATTENDING PHYSICIAN PROGRESS NOTE     I have examined the patient, reviewed the record, and discussed the case with the APN/ Resident. I have reviewed all relevant labs and imaging data. The following summarizes my clinical findings and independent assessment. CC: splenic abscess    Pt denies abd pain.     Awake and alert  Follows commands  Hrt:  Regular  Lungs:  Fairly clear bilaterally  Abd:  Soft; BS active; NT/ND  Skin:  Warm/dry    Patient Active Problem List    Diagnosis Date Noted    Decubitus ulcer of sacral area 08/01/2020    Abnormal findings on diagnostic imaging of gall bladder 08/01/2020    Splenic infarction 08/01/2020    Cyst of spleen 08/01/2020    Splenic abscess 08/01/2020    Anemia 08/01/2020    [address] embolism [address] 08/01/2020    Enterocolitis 07/31/2020    Rectus diastasis 11/01/2019    Recurrent unilateral inguinal hernia 11/01/2019    Cellulitis of sacral region 07/02/2019    Cellulitis of foot 06/30/2019    Neuropathy 06/30/2019    Cellulitis, wound, post-operative 06/30/2019    Left leg swelling 06/30/2019    SIRS (systemic inflammatory response syndrome) ([address] 06/30/2019    Primary osteoarthritis of right hip 11/03/2016    Primary osteoarthritis of left hip 04/11/2016    Type 2 diabetes mellitus ([address] 04/08/2016    Lumbar disc herniation 02/22/2016    Lumbar radiculopathy 12/17/2015    Osteoarthritis of spine with radiculopathy, lumbar region 12/17/2015    Class 1 obesity in adult 12/17/2015    Allergic rhinitis 11/23/2015    Essential hypertension 10/16/2015    Vitamin D deficiency 04/14/2015    Fibromyalgia 12/15/2014    Insomnia 07/04/2014    Osteoarthritis 03/27/2014    Lumbar spondylosis 01/07/2014    Neuropathic pain 05/08/2012       Splenic fluid collection--cont to monitor  Thrombocytosis--secondary to splenectomy--if persists, start ASA  Sacral decub--local wound care    Magdalena Weinstein MD, FACS  8/1/2020  10:39 AM

## 2020-08-01 NOTE — CONSULTS
intervertebral disc without myelopathy     Fibromyalgia     Fractured rib     2008 / healed    H/O seasonal allergies     Head injury 1980'S apx    no residual s/s    Hyperlipidemia     Hypertension     Obesity (BMI 35.0-39.9 without comorbidity)     bmi 39.2  weight 296 #    Osteoarthritis     Thoracic or lumbosacral neuritis or radiculitis, unspecified         Past Surgical History     Past Surgical History:   Procedure Laterality Date    FOOT SURGERY Right 1985    to treat shattered bones    HERNIA REPAIR  2001    DOUBLE HERNIA    HERNIA REPAIR Right 12/9/2019    LAPAROSCOPIC RIGHT INGUINAL HERNIA REPAIR, MESH 10x15 cm PLACEMENT performed by Wei Stafford MD at [facility] Left 4/11/2016    NECK SURGERY  2000    FUSION    [place] COLONOSCOPY FLX DX W/COLLJ SPEC WHEN PFRMD N/A 5/7/2018    COLONOSCOPY DIAGNOSTIC performed by Devan Rene MD at [address] [facility] Left 2014    Dr. Elder Lee ARTHROSCOPY Left 11 21 14    SHOULDER SURGERY  2001 &2007    RIGHT AND LEFT repair of tears    TOTAL HIP ARTHROPLASTY Right 10/31/2016    Total right hip  Edilma Garrison MD    Fred Short WRIST SURGERY  2007    LEFT WRIST        Medications - Prior to Admission and Current Meds     Prior to Admission medications    Medication Sig Start Date End Date Taking?  Authorizing Provider   Continuous Blood Gluc Sensor ([facility]) MISC Continuous Blood Gluc Sensor ([address] [place] [address]) MISC Continuous Blood Gluc Sensor (FREESTYLE JOANIE SENSOR SYSTEM) MISC Indications: Type 2 diabetes mellitus without complication, without long-term current use of insulin (HCC) Check FBS daily and PRN 1 each 0 07/22/2019 Active 07- Viru 65 ([id number]) 7/22/19   Historical Provider, MD   fluticasone [facility]) 50 MCG/ACT nasal spray instill 2 sprays into each nostril once daily 5/14/20   Wolf Hartman, APRN - CNP   metFORMIN (GLUCOPHAGE) 1000 MG tablet take 1 tablet by mouth twice a day with meals 5/14/20   Derl Ronaldo, APRN - CNP   cyclobenzaprine (FLEXERIL) 10 MG tablet take 1 tablet by mouth three times a day for if needed for muscle spasm 5/14/20   [facility] Ronaldo, APRN - CNP   fenofibrate (TRICOR) 54 MG tablet take 1 tablet by mouth once daily 5/14/20   [facility] Ronaldo, APRN - CNP   pregabalin (LYRICA) 300 MG capsule take 1 capsule by mouth twice a day  Patient not taking: Reported on 5/28/2020 5/14/20 8/14/20  Derl Ronaldo, APRN - CNP   aspirin (SM ASPIRIN ADULT LOW STRENGTH) 81 MG EC tablet take 1 tablet by mouth once daily 5/14/20   [facility] Ronaldo, APRN - CNP   DULoxetine (CYMBALTA) 30 MG extended release capsule Take 1 capsule by mouth daily 5/14/20   [facility] Ronaldo, APRN - CNP   lisinopril (PRINIVIL;ZESTRIL) 20 MG tablet take 1 tablet by mouth once daily 5/14/20   [facility] Ronaldo, APRN - CNP   pravastatin (PRAVACHOL) 20 MG tablet Take 1 tablet by mouth nightly 5/14/20   [facility] Ronaldo, APRN - CNP   ibuprofen (ADVIL;MOTRIN) 600 MG tablet take 1 tablet by mouth four times a day if needed for pain 5/11/20   Ivy Higgins, APRN - CNP        Inpatient:  0.9 % sodium chloride infusion, Continuous        Allergies   Seasonal and Tramadol    Social History     Social History     Tobacco History     Smoking Status  Never Smoker    Smokeless Tobacco Use  Current User Smokeless Tobacco Type  Chew          Alcohol History     Alcohol Use Status  Not Currently Drinks/Week  0 Standard drinks or equivalent per week Amount  0.0 standard drinks of alcohol/wk          Drug Use     Drug Use Status  No          Sexual Activity     Sexually Active  Not Currently                Family History     Family History   Problem Relation Age of Onset    Hypertension Mother     Arthritis Father     Diabetes Father     Cancer Maternal Grandfather         Skin    Cancer Paternal Uncle         skin    Diabetes Paternal Grandfather     Heart Disease Maternal Grandmother     Stroke Maternal Aunt        Review of Systems   Pertinent ROS listed in HPI, all others negative    Physical Exam   /72   Pulse 114   Temp 98.8 °F (37.1 °C) (Tympanic)   Resp 20   Wt (!) 315 lb (142.9 kg)   SpO2 98%   BMI 41.56 kg/m²     GENERAL:  No acute distress. Alert and oriented. HEAD:  Normocephalic, atraumatic. EYES:  No scleral icterus. Conjugate gaze. ENT/NECK:  Trachea midline, mucous membranes moist.   LUNGS:  No cough. Nonlabored breathing on room air. CARDIOVASC:  Tachy rate, no cyanosis, regular rhythm. ABDOMEN:  Soft, non-distended, mild to moderately-tender periumbilical, and mildly tender in LUQ with deep palpation. No guarding / rigidity / rebound. LIMBS:  No deformities, no edema. NEURO:  Face symmetric, moves all extremities  SKIN:  Warm, dry. Labs    CBC  Recent Labs     20  1725   WBC 9.5   HGB 11.1*   HCT 35.7*   PLT 1,190*     BMP  Recent Labs     20  1725      K 3.8   CL 96*   CO2 23   BUN 8   CREATININE 0.9   CALCIUM 9.1     Liver Function  Recent Labs     20  1725   LIPASE 11*   BILITOT <0.2   AST 8   ALT 7   ALKPHOS 93   PROT 6.3*   LABALBU 3.0*     No results for input(s): LACTATE in the last 72 hours. Recent Labs     20  1725   INR 1.2       Imaging/Diagnostics Last 24 Hours   Ct Abdomen Pelvis W Iv Contrast Additional Contrast? Oral    Result Date: 2020  Patient MRN:  [id number] : 1975 Age: 53 years Gender: Male Order Date:  2020 1:04 PM EXAM: CT ABDOMEN PELVIS W IV CONTRAST number of images 411. Contrast. Isovue-370, 110 mL intravenous. Omnipaque 240, 50 mL oral Technique: Low-dose CT  acquisition technique included one of following options; 1 . Automated exposure control, 2. Adjustment of MA and or KV according to patient's size or 3. Use of iterative reconstruction.  INDICATION: A08.39 [facility]  COMPARISON: 2020 FINDINGS: The lung bases demonstrate minimal atelectasis. The liver is fatty infiltrated. The gallbladder is partially distended with wall thickening concerning for cholecystitis. Spleen is not identified. A large complex cystic collection measuring 9 x 13 cm is identified in this splenic bed concerning for abscess. Pancreas, the adrenals and the kidneys are normal. There is significantly thickened inflamed and distended proximal jejunal loops with  mild inflammatory changes in the adjacent mesentery with the mesenteric lymph nodes. Pelvis. The bladder is partially distended with wall thickening. Please correlate with urinalysis. Bilateral hip prosthesis causing streak artifacts limiting the study. Colon is collapsed with wall thickening. There is normal appendix. Large decubitus ulcer is identified in the midline gluteus region with the large soft tissue defect measuring nearly 4 x 2.9 cm concerning for cellulitis and phlegmon. Diffusely thickened/inflamed and dilated jejunal loops concerning for enteritis. There is also mild colitis. Inflammatory changes and lymph nodes are identified in the mesentery. A large fluid collection the splenic bed concerning for abscess. Thickened gallbladder concerning for cholecystitis. Consider hepatobiliary scan. Large decubitus ulcer in the midline right gluteus region with cellulitis and phlegmon. The above findings were telephoned to the ordering physician. ALERT:  THIS IS AN ABNORMAL REPORT      Assessment        53 y.o. male with enteritis and milder colitis. Known stable infarcted spleen mostly replaced by fluid collection, unlikely to be an abscess especially given his normal WBC. Plan   - stool studies and CMV tests pending, would defer to medicine versus infectious disease if any return positive  - supportive care per primary and will monitor abdominal exam    Plan was discussed with Dr. Padmini Guillaume.     Electronically signed by Yarely Vale MD on 7/31/20 at 8:40 PM EDT

## 2020-08-01 NOTE — CONSULTS
swelling, redness and pain in his left foot. He had recently had surgery (left foot tarsal tunnel release 6/20/19) by Dr. Kiley Emerson. Seen by Dr. Tera Dutton and treated for left surgical site infection with Vancomycin and Cefepime. Cultures grew Pantoea and MRSA. He was discharged on Augmentin and Doxycycline.     Past Surgical History:        Procedure Laterality Date    FOOT SURGERY Right 1985    to treat shattered bones    HERNIA REPAIR  2001    DOUBLE HERNIA    HERNIA REPAIR Right 12/9/2019    LAPAROSCOPIC RIGHT INGUINAL HERNIA REPAIR, MESH 10x15 cm PLACEMENT performed by Esme Whyte MD at [facility] Left 4/11/2016    NECK SURGERY  2000    FUSION    ME COLONOSCOPY FLX DX W/COLLJ SPEC WHEN PFRMD N/A 5/7/2018    COLONOSCOPY DIAGNOSTIC performed by Juan Francisco Fuchs MD at [facility] Left 2014    Dr. Bianka Kirk ARTHROSCOPY Left 11 21 14    SHOULDER SURGERY  2001 &2007    RIGHT AND LEFT repair of tears    TOTAL HIP ARTHROPLASTY Right 10/31/2016    Total right hip  Yesenia Hutson MD    VASECTOMY      WRIST SURGERY  2007    LEFT WRIST     Current Medications:   Scheduled Meds:   DULoxetine  30 mg Oral Daily    fenofibrate  54 mg Oral Daily    fluticasone  1 spray Each Nostril Daily    insulin glargine  20 Units Subcutaneous Nightly    insulin lispro  5 Units Subcutaneous TID WC    lisinopril  20 mg Oral Daily    pantoprazole  40 mg Oral QAM AC    pravastatin  20 mg Oral Nightly    pregabalin  300 mg Oral BID    insulin lispro  0-10 Units Subcutaneous 4x Daily WC    sodium chloride flush  10 mL Intravenous 2 times per day    enoxaparin  1 mg/kg Subcutaneous BID    piperacillin-tazobactam  3.375 g Intravenous Q8H    sodium chloride   Intravenous Q8H    alteplase  1 mg Intracatheter Once     Continuous Infusions:   dextrose      sodium chloride 1,000 mL (08/01/20 1228)     PRN Meds:cyclobenzaprine, melatonin, glucose, dextrose, glucagon (rDNA), Diabetes Paternal Grandfather     Heart Disease Maternal Grandmother     Stroke Maternal Aunt    . Otherwise non-pertinent to the chief complaint. REVIEW OF SYSTEMS:    Constitutional: Negative for fevers, chills, diaphoresis  Neurologic: Negative   Psychiatric: Negative  Rheumatologic: Negative   Endocrine: Negative  Hematologic: Negative  Immunologic: Negative  ENT: Negative  Respiratory: Negative   Cardiovascular: Negative  GI: Abdominal pain  : Negative  Musculoskeletal: Negative  Skin: No rashes. PHYSICAL EXAM:    Vitals:   /68   Pulse 144   Temp 98.1 °F (36.7 °C) (Temporal)   Resp 17   Ht 6' 1\" (1.854 m)   Wt 229 lb (103.9 kg)   SpO2 97%   BMI 30.21 kg/m²   Constitutional: The patient is awake, alert, and oriented. Skin: Warm and dry. No rashes were noted. HEENT: Eyes show round, and reactive pupils. No jaundice. Moist mucous membranes, no ulcerations, no thrush. Neck: Supple to movements. No lymphadenopathy. Chest: No use of accessory muscles to breathe. Symmetrical expansion. Auscultation reveals no wheezing, crackles, or rhonchi. Cardiovascular: S1 and S2 are rhythmic and regular. No murmurs appreciated. Abdomen: Positive bowel sounds to auscultation. Diffusely tender to palpation. No masses were felt. Extremities: No clubbing, no cyanosis, no edema.   Lines: Left PICC      CBC+dif:  Recent Labs     07/31/20  1725 08/01/20  0457   WBC 9.5 13.0*   HGB 11.1* 10.2*   HCT 35.7* 32.7*   MCV 88.8 89.8   PLT 1,190* 1,034*   NEUTROABS 4.94 8.45*     Lab Results   Component Value Date    CRP 3.9 (H) 08/01/2020    CRP 7.6 (H) 06/30/2019      No results found for: CRPHS  Lab Results   Component Value Date    SEDRATE 28 (H) 06/30/2019     Lab Results   Component Value Date    ALT 7 07/31/2020    AST 8 07/31/2020    ALKPHOS 93 07/31/2020    BILITOT <0.2 07/31/2020     Lab Results   Component Value Date     08/01/2020    K 3.8 08/01/2020    K 3.8 07/31/2020     08/01/2020 CO2 26 08/01/2020    BUN 5 08/01/2020    CREATININE 0.7 08/01/2020    GFRAA >60 08/01/2020    LABGLOM >60 08/01/2020    GLUCOSE 109 08/01/2020    GLUCOSE 125 05/11/2012    PROT 6.3 07/31/2020    LABALBU 3.0 07/31/2020    LABALBU 4.8 05/11/2012    CALCIUM 8.9 08/01/2020    BILITOT <0.2 07/31/2020    ALKPHOS 93 07/31/2020    AST 8 07/31/2020    ALT 7 07/31/2020       Lab Results   Component Value Date    PROTIME 12.9 07/31/2020    INR 1.2 07/31/2020       Lab Results   Component Value Date    TSH 0.669 06/01/2020       Lab Results   Component Value Date    COLORU Yellow 08/01/2020    PHUR 6.5 08/01/2020    CLARITYU Clear 08/01/2020    SPECGRAV 1.010 08/01/2020    LEUKOCYTESUR Negative 08/01/2020    UROBILINOGEN 0.2 08/01/2020    BILIRUBINUR Negative 08/01/2020    BLOODU Negative 08/01/2020    GLUCOSEU Negative 08/01/2020       No results found for: HCO3, BE, O2SAT, PH, THGB, PCO2, PO2, TCO2  Radiology:  CT abdomen and pelvis-[facility] 7/15/2020 (compared to 7/1/2020: Again identified is hypodense nonenhancing enlarged spleen-like   structure measuring 15.4 x 10.5 cm in the left upper quadrant with   internal fluid/low-density, could be related to infarcted spleen with   possible abscess formation. CT done here reviewed    Microbiology:  Pending  No results for input(s): BC in the last 72 hours. No results for input(s): ORG in the last 72 hours. No results for input(s): Canyd Victoria in the last 72 hours. No results for input(s): STREPNEUMAGU in the last 72 hours. No results for input(s): LP1UAG in the last 72 hours. No results for input(s): ASO in the last 72 hours. No results for input(s): CULTRESP in the last 72 hours. Recent Labs     08/01/20  0457   PROCAL 0.09*  0.08       Assessment:  · Acute cholecystitis   · History of pulmonary embolism  · Large fluid collection over the splenic bed  · Leukocytosis associated to the above  · Sacral decubitus ulcer, stage IV.   Currently not infected    Plan:    · Continue

## 2020-08-02 LAB
ALBUMIN SERPL-MCNC: 2.5 G/DL (ref 3.5–5.2)
ALP BLD-CCNC: 93 U/L (ref 40–129)
ALT SERPL-CCNC: 5 U/L (ref 0–40)
AMMONIA: 22 UMOL/L (ref 16–60)
ANION GAP SERPL CALCULATED.3IONS-SCNC: 11 MMOL/L (ref 7–16)
ANISOCYTOSIS: ABNORMAL
APTT: 166.1 SEC (ref 24.5–35.1)
APTT: 32.1 SEC (ref 24.5–35.1)
AST SERPL-CCNC: <5 U/L (ref 0–39)
BASOPHILS ABSOLUTE: 0 E9/L (ref 0–0.2)
BASOPHILS RELATIVE PERCENT: 0.8 % (ref 0–2)
BILIRUB SERPL-MCNC: <0.2 MG/DL (ref 0–1.2)
BUN BLDV-MCNC: 8 MG/DL (ref 6–20)
CALCIUM SERPL-MCNC: 8.5 MG/DL (ref 8.6–10.2)
CHLORIDE BLD-SCNC: 104 MMOL/L (ref 98–107)
CO2: 24 MMOL/L (ref 22–29)
CREAT SERPL-MCNC: 0.9 MG/DL (ref 0.7–1.2)
EKG ATRIAL RATE: 107 BPM
EKG ATRIAL RATE: 120 BPM
EKG ATRIAL RATE: 129 BPM
EKG P AXIS: 57 DEGREES
EKG P-R INTERVAL: 162 MS
EKG Q-T INTERVAL: 312 MS
EKG Q-T INTERVAL: 324 MS
EKG Q-T INTERVAL: 354 MS
EKG QRS DURATION: 108 MS
EKG QRS DURATION: 94 MS
EKG QRS DURATION: 98 MS
EKG QTC CALCULATION (BAZETT): 459 MS
EKG QTC CALCULATION (BAZETT): 472 MS
EKG QTC CALCULATION (BAZETT): 474 MS
EKG R AXIS: 163 DEGREES
EKG R AXIS: 31 DEGREES
EKG R AXIS: 49 DEGREES
EKG T AXIS: 16 DEGREES
EKG T AXIS: 169 DEGREES
EKG T AXIS: 60 DEGREES
EKG VENTRICULAR RATE: 107 BPM
EKG VENTRICULAR RATE: 129 BPM
EKG VENTRICULAR RATE: 130 BPM
EOSINOPHILS ABSOLUTE: 0.74 E9/L (ref 0.05–0.5)
EOSINOPHILS RELATIVE PERCENT: 7 % (ref 0–6)
GFR AFRICAN AMERICAN: >60
GFR NON-AFRICAN AMERICAN: >60 ML/MIN/1.73
GLUCOSE BLD-MCNC: 129 MG/DL (ref 74–99)
HCT VFR BLD CALC: 28 % (ref 37–54)
HCT VFR BLD CALC: 29.8 % (ref 37–54)
HEMOGLOBIN: 8.6 G/DL (ref 12.5–16.5)
HEMOGLOBIN: 9.2 G/DL (ref 12.5–16.5)
HYPOCHROMIA: ABNORMAL
INR BLD: 1.2
LACTIC ACID: 0.7 MMOL/L (ref 0.5–2.2)
LYMPHOCYTES ABSOLUTE: 3.15 E9/L (ref 1.5–4)
LYMPHOCYTES RELATIVE PERCENT: 29.6 % (ref 20–42)
MCH RBC QN AUTO: 27.4 PG (ref 26–35)
MCH RBC QN AUTO: 27.5 PG (ref 26–35)
MCHC RBC AUTO-ENTMCNC: 30.7 % (ref 32–34.5)
MCHC RBC AUTO-ENTMCNC: 30.9 % (ref 32–34.5)
MCV RBC AUTO: 89.2 FL (ref 80–99.9)
MCV RBC AUTO: 89.2 FL (ref 80–99.9)
METER GLUCOSE: 102 MG/DL (ref 74–99)
METER GLUCOSE: 106 MG/DL (ref 74–99)
METER GLUCOSE: 88 MG/DL (ref 74–99)
MONOCYTES ABSOLUTE: 0.94 E9/L (ref 0.1–0.95)
MONOCYTES RELATIVE PERCENT: 8.7 % (ref 2–12)
NEUTROPHILS ABSOLUTE: 5.78 E9/L (ref 1.8–7.3)
NEUTROPHILS RELATIVE PERCENT: 54.8 % (ref 43–80)
PDW BLD-RTO: 15 FL (ref 11.5–15)
PDW BLD-RTO: 15.1 FL (ref 11.5–15)
PLATELET # BLD: 795 E9/L (ref 130–450)
PLATELET # BLD: 915 E9/L (ref 130–450)
PMV BLD AUTO: 8.3 FL (ref 7–12)
PMV BLD AUTO: 8.5 FL (ref 7–12)
POIKILOCYTES: ABNORMAL
POLYCHROMASIA: ABNORMAL
POTASSIUM REFLEX MAGNESIUM: 3.7 MMOL/L (ref 3.5–5)
PROTHROMBIN TIME: 13.8 SEC (ref 9.3–12.4)
RBC # BLD: 3.14 E12/L (ref 3.8–5.8)
RBC # BLD: 3.34 E12/L (ref 3.8–5.8)
REASON FOR REJECTION: NORMAL
REJECTED TEST: NORMAL
SODIUM BLD-SCNC: 139 MMOL/L (ref 132–146)
TARGET CELLS: ABNORMAL
TOTAL PROTEIN: 5 G/DL (ref 6.4–8.3)
TROPONIN: 0.02 NG/ML (ref 0–0.03)
TROPONIN: 0.07 NG/ML (ref 0–0.03)
WBC # BLD: 10.5 E9/L (ref 4.5–11.5)
WBC # BLD: 8 E9/L (ref 4.5–11.5)

## 2020-08-02 PROCEDURE — 85730 THROMBOPLASTIN TIME PARTIAL: CPT

## 2020-08-02 PROCEDURE — 6360000002 HC RX W HCPCS: Performed by: FAMILY MEDICINE

## 2020-08-02 PROCEDURE — 2580000003 HC RX 258: Performed by: INTERNAL MEDICINE

## 2020-08-02 PROCEDURE — 99233 SBSQ HOSP IP/OBS HIGH 50: CPT | Performed by: INTERNAL MEDICINE

## 2020-08-02 PROCEDURE — 84484 ASSAY OF TROPONIN QUANT: CPT

## 2020-08-02 PROCEDURE — 6360000002 HC RX W HCPCS: Performed by: INTERNAL MEDICINE

## 2020-08-02 PROCEDURE — 6370000000 HC RX 637 (ALT 250 FOR IP): Performed by: FAMILY MEDICINE

## 2020-08-02 PROCEDURE — 36415 COLL VENOUS BLD VENIPUNCTURE: CPT

## 2020-08-02 PROCEDURE — 2500000003 HC RX 250 WO HCPCS

## 2020-08-02 PROCEDURE — 99232 SBSQ HOSP IP/OBS MODERATE 35: CPT | Performed by: SURGERY

## 2020-08-02 PROCEDURE — 85610 PROTHROMBIN TIME: CPT

## 2020-08-02 PROCEDURE — 85025 COMPLETE CBC W/AUTO DIFF WBC: CPT

## 2020-08-02 PROCEDURE — 85027 COMPLETE CBC AUTOMATED: CPT

## 2020-08-02 PROCEDURE — 2000000000 HC ICU R&B

## 2020-08-02 PROCEDURE — 82962 GLUCOSE BLOOD TEST: CPT

## 2020-08-02 PROCEDURE — 82140 ASSAY OF AMMONIA: CPT

## 2020-08-02 PROCEDURE — 6370000000 HC RX 637 (ALT 250 FOR IP): Performed by: INTERNAL MEDICINE

## 2020-08-02 PROCEDURE — 80053 COMPREHEN METABOLIC PANEL: CPT

## 2020-08-02 PROCEDURE — 2580000003 HC RX 258: Performed by: FAMILY MEDICINE

## 2020-08-02 PROCEDURE — 93005 ELECTROCARDIOGRAM TRACING: CPT | Performed by: INTERNAL MEDICINE

## 2020-08-02 PROCEDURE — 83605 ASSAY OF LACTIC ACID: CPT

## 2020-08-02 PROCEDURE — 36592 COLLECT BLOOD FROM PICC: CPT

## 2020-08-02 PROCEDURE — 2580000003 HC RX 258: Performed by: STUDENT IN AN ORGANIZED HEALTH CARE EDUCATION/TRAINING PROGRAM

## 2020-08-02 RX ORDER — LIDOCAINE HYDROCHLORIDE 10 MG/ML
5 INJECTION, SOLUTION INFILTRATION; PERINEURAL ONCE
Status: COMPLETED | OUTPATIENT
Start: 2020-08-02 | End: 2020-08-02

## 2020-08-02 RX ORDER — HEPARIN SODIUM 1000 [USP'U]/ML
40 INJECTION, SOLUTION INTRAVENOUS; SUBCUTANEOUS PRN
Status: DISCONTINUED | OUTPATIENT
Start: 2020-08-02 | End: 2020-08-04 | Stop reason: ALTCHOICE

## 2020-08-02 RX ORDER — SODIUM CHLORIDE, SODIUM LACTATE, POTASSIUM CHLORIDE, CALCIUM CHLORIDE 600; 310; 30; 20 MG/100ML; MG/100ML; MG/100ML; MG/100ML
1000 INJECTION, SOLUTION INTRAVENOUS ONCE
Status: COMPLETED | OUTPATIENT
Start: 2020-08-02 | End: 2020-08-02

## 2020-08-02 RX ORDER — LIDOCAINE HYDROCHLORIDE 10 MG/ML
INJECTION, SOLUTION EPIDURAL; INFILTRATION; INTRACAUDAL; PERINEURAL
Status: COMPLETED
Start: 2020-08-02 | End: 2020-08-02

## 2020-08-02 RX ORDER — HEPARIN SODIUM 1000 [USP'U]/ML
80 INJECTION, SOLUTION INTRAVENOUS; SUBCUTANEOUS ONCE
Status: COMPLETED | OUTPATIENT
Start: 2020-08-02 | End: 2020-08-02

## 2020-08-02 RX ORDER — HEPARIN SODIUM 1000 [USP'U]/ML
80 INJECTION, SOLUTION INTRAVENOUS; SUBCUTANEOUS PRN
Status: DISCONTINUED | OUTPATIENT
Start: 2020-08-02 | End: 2020-08-04 | Stop reason: ALTCHOICE

## 2020-08-02 RX ORDER — HEPARIN SODIUM (PORCINE) LOCK FLUSH IV SOLN 100 UNIT/ML 100 UNIT/ML
100 SOLUTION INTRAVENOUS PRN
Status: DISCONTINUED | OUTPATIENT
Start: 2020-08-02 | End: 2020-08-11 | Stop reason: HOSPADM

## 2020-08-02 RX ORDER — SODIUM CHLORIDE 0.9 % (FLUSH) 0.9 %
10 SYRINGE (ML) INJECTION EVERY 12 HOURS SCHEDULED
Status: DISCONTINUED | OUTPATIENT
Start: 2020-08-02 | End: 2020-08-11 | Stop reason: HOSPADM

## 2020-08-02 RX ORDER — HEPARIN SODIUM 10000 [USP'U]/100ML
18 INJECTION, SOLUTION INTRAVENOUS CONTINUOUS
Status: DISCONTINUED | OUTPATIENT
Start: 2020-08-02 | End: 2020-08-04

## 2020-08-02 RX ORDER — SODIUM CHLORIDE 0.9 % (FLUSH) 0.9 %
10 SYRINGE (ML) INJECTION PRN
Status: DISCONTINUED | OUTPATIENT
Start: 2020-08-02 | End: 2020-08-11 | Stop reason: HOSPADM

## 2020-08-02 RX ORDER — 0.9 % SODIUM CHLORIDE 0.9 %
1000 INTRAVENOUS SOLUTION INTRAVENOUS ONCE
Status: DISCONTINUED | OUTPATIENT
Start: 2020-08-02 | End: 2020-08-02

## 2020-08-02 RX ADMIN — ALTEPLASE 1 MG: 2.2 INJECTION, POWDER, LYOPHILIZED, FOR SOLUTION INTRAVENOUS at 02:25

## 2020-08-02 RX ADMIN — PREGABALIN 300 MG: 100 CAPSULE ORAL at 09:08

## 2020-08-02 RX ADMIN — CYCLOBENZAPRINE 5 MG: 10 TABLET, FILM COATED ORAL at 08:57

## 2020-08-02 RX ADMIN — LIDOCAINE HYDROCHLORIDE 5 ML: 10 INJECTION, SOLUTION INFILTRATION; PERINEURAL at 03:09

## 2020-08-02 RX ADMIN — SODIUM CHLORIDE: 9 INJECTION, SOLUTION INTRAVENOUS at 23:45

## 2020-08-02 RX ADMIN — SODIUM CHLORIDE, POTASSIUM CHLORIDE, SODIUM LACTATE AND CALCIUM CHLORIDE 1000 ML: 600; 310; 30; 20 INJECTION, SOLUTION INTRAVENOUS at 03:03

## 2020-08-02 RX ADMIN — LIDOCAINE HYDROCHLORIDE 5 ML: 10 INJECTION, SOLUTION EPIDURAL; INFILTRATION; INTRACAUDAL; PERINEURAL at 03:00

## 2020-08-02 RX ADMIN — FENOFIBRATE 54 MG: 54 TABLET ORAL at 08:57

## 2020-08-02 RX ADMIN — OXYCODONE AND ACETAMINOPHEN 1 TABLET: 5; 325 TABLET ORAL at 14:16

## 2020-08-02 RX ADMIN — OXYCODONE AND ACETAMINOPHEN 1 TABLET: 5; 325 TABLET ORAL at 21:09

## 2020-08-02 RX ADMIN — PIPERACILLIN AND TAZOBACTAM 3.38 G: 3; .375 INJECTION, POWDER, FOR SOLUTION INTRAVENOUS at 19:45

## 2020-08-02 RX ADMIN — METOPROLOL TARTRATE: 5 INJECTION INTRAVENOUS at 03:06

## 2020-08-02 RX ADMIN — POTASSIUM CHLORIDE 20 MEQ: 1500 TABLET, EXTENDED RELEASE ORAL at 08:58

## 2020-08-02 RX ADMIN — PIPERACILLIN AND TAZOBACTAM 3.38 G: 3; .375 INJECTION, POWDER, FOR SOLUTION INTRAVENOUS at 03:52

## 2020-08-02 RX ADMIN — HEPARIN SODIUM 9600 UNITS: 1000 INJECTION INTRAVENOUS; SUBCUTANEOUS at 16:03

## 2020-08-02 RX ADMIN — HEPARIN SODIUM 18 UNITS/KG/HR: 10000 INJECTION, SOLUTION INTRAVENOUS at 16:03

## 2020-08-02 RX ADMIN — SODIUM CHLORIDE, PRESERVATIVE FREE 10 ML: 5 INJECTION INTRAVENOUS at 21:16

## 2020-08-02 RX ADMIN — LIDOCAINE HYDROCHLORIDE 5 ML: 10 INJECTION, SOLUTION INFILTRATION; PERINEURAL at 03:00

## 2020-08-02 RX ADMIN — SODIUM CHLORIDE, POTASSIUM CHLORIDE, SODIUM LACTATE AND CALCIUM CHLORIDE 1000 ML: 600; 310; 30; 20 INJECTION, SOLUTION INTRAVENOUS at 02:26

## 2020-08-02 RX ADMIN — SODIUM CHLORIDE: 9 INJECTION, SOLUTION INTRAVENOUS at 06:04

## 2020-08-02 RX ADMIN — OXYCODONE AND ACETAMINOPHEN 1 TABLET: 5; 325 TABLET ORAL at 09:03

## 2020-08-02 RX ADMIN — SODIUM CHLORIDE, PRESERVATIVE FREE 10 ML: 5 INJECTION INTRAVENOUS at 09:04

## 2020-08-02 RX ADMIN — PREGABALIN 300 MG: 100 CAPSULE ORAL at 21:09

## 2020-08-02 RX ADMIN — PRAVASTATIN SODIUM 20 MG: 20 TABLET ORAL at 21:10

## 2020-08-02 RX ADMIN — LIDOCAINE HYDROCHLORIDE 5 ML: 10 INJECTION, SOLUTION EPIDURAL; INFILTRATION; INTRACAUDAL; PERINEURAL at 03:09

## 2020-08-02 RX ADMIN — MORPHINE SULFATE 2 MG: 2 INJECTION, SOLUTION INTRAMUSCULAR; INTRAVENOUS at 19:45

## 2020-08-02 RX ADMIN — DULOXETINE HYDROCHLORIDE 30 MG: 30 CAPSULE, DELAYED RELEASE ORAL at 08:57

## 2020-08-02 RX ADMIN — MORPHINE SULFATE 2 MG: 2 INJECTION, SOLUTION INTRAMUSCULAR; INTRAVENOUS at 03:53

## 2020-08-02 RX ADMIN — PIPERACILLIN AND TAZOBACTAM 3.38 G: 3; .375 INJECTION, POWDER, FOR SOLUTION INTRAVENOUS at 10:03

## 2020-08-02 ASSESSMENT — PAIN DESCRIPTION - ORIENTATION
ORIENTATION: MID

## 2020-08-02 ASSESSMENT — PAIN DESCRIPTION - LOCATION
LOCATION: COCCYX
LOCATION: COCCYX;ABDOMEN
LOCATION: COCCYX;BACK

## 2020-08-02 ASSESSMENT — PAIN SCALES - GENERAL
PAINLEVEL_OUTOF10: 0
PAINLEVEL_OUTOF10: 8
PAINLEVEL_OUTOF10: 10
PAINLEVEL_OUTOF10: 8
PAINLEVEL_OUTOF10: 0
PAINLEVEL_OUTOF10: 7
PAINLEVEL_OUTOF10: 7
PAINLEVEL_OUTOF10: 10
PAINLEVEL_OUTOF10: 10

## 2020-08-02 ASSESSMENT — PAIN DESCRIPTION - FREQUENCY
FREQUENCY: CONTINUOUS

## 2020-08-02 ASSESSMENT — PAIN DESCRIPTION - DESCRIPTORS
DESCRIPTORS: ACHING;DISCOMFORT
DESCRIPTORS: THROBBING
DESCRIPTORS: ACHING;DISCOMFORT

## 2020-08-02 ASSESSMENT — PAIN DESCRIPTION - ONSET
ONSET: ON-GOING

## 2020-08-02 ASSESSMENT — PAIN DESCRIPTION - PROGRESSION
CLINICAL_PROGRESSION: GRADUALLY WORSENING
CLINICAL_PROGRESSION: NOT CHANGED
CLINICAL_PROGRESSION: NOT CHANGED

## 2020-08-02 ASSESSMENT — PAIN - FUNCTIONAL ASSESSMENT
PAIN_FUNCTIONAL_ASSESSMENT: INTOLERABLE, UNABLE TO DO ANY ACTIVE OR PASSIVE ACTIVITIES
PAIN_FUNCTIONAL_ASSESSMENT: PREVENTS OR INTERFERES SOME ACTIVE ACTIVITIES AND ADLS
PAIN_FUNCTIONAL_ASSESSMENT: PREVENTS OR INTERFERES SOME ACTIVE ACTIVITIES AND ADLS

## 2020-08-02 ASSESSMENT — PAIN DESCRIPTION - PAIN TYPE
TYPE: CHRONIC PAIN
TYPE: ACUTE PAIN
TYPE: CHRONIC PAIN

## 2020-08-02 NOTE — PROGRESS NOTES
[facility] Quality Flow/Interdisciplinary Rounds Progress Note        Quality Flow Rounds held on August 2, 2020    Disciplines Attending:  Bedside nurse, charge nurse, , PT/OT, pharmacy, nursing unit leadership, , Medical residents    Pedro Sickle II was admitted on 7/31/2020  5:16 PM    Anticipated Discharge Date:  Expected Discharge Date: 08/03/20    Disposition:    Balbir Score:  Balbir Scale Score: 14    Readmission Risk              Risk of Unplanned Readmission:        14           Discussed patient goal for the day, patient clinical progression, and barriers to discharge.   The following Goal(s) of the Day/Commitment(s) have been identified:  Continue current treatment plan       Rachel Lr  August 2, 2020

## 2020-08-02 NOTE — CONSULTS
[address]  Internal Medicine Residency Program  History and Physical  MICU    Patient:  David Grace 53 y.o. male MRN: [id number]     Date of Service: 8/2/2020    Hospital Day: 3      Chief complaint: transfer after RRT- hypotensive and tachycardic   History of Present Illness   Mak Osman II is a 53 y.o. male with past medical history significant for obesity, hypertension, DMT2 who presented to [facility] from nursing home for diffuse abdominal pain and diarrhea; pt was tachycardic on admission; CT showed splenic abscess, and  HIDA confirmed acute cholecystitis. On evening of 8/1/2020, an RRT was called where patient was found to be tachycardic to the 120s and hypotensive. Transferred to MICU. Pt denies CP, SOB, fevers, chills, N/V. His only complaint was diffuse abdominal and back pain. Spoke to pt's parents Viraj Hendricks and Thiago Durbin). On June 2nd, patient father states he was unable to wake patient up and had to be admitted. The patient reports he had to be resuscitated and his heart stopped- however cannot find this information on care everywhere. Pt was on respiratory for 10 days and dialysis for 3 weeks, as per father. At that admission he was found to found to have pulmonary embolism treated with enoxaparin and also found to have a splenic infarct vs abscess. Blood cultures were negative at that time. He was also found to have a coccygeal wound that was infected and debrided. Pt was discharged to the nursing home for rehabilitation. Of note, patient reports for the last week has had wound VAC on the same coccygeal wound. Pt's parents also are concerned patient's voice sounds more slurred. They report they were speaking to him on the phone prior to the RRT and noticed him breathing heavily and slurred voice.      Past Medical History:      Diagnosis Date    Accident 11/2019    stepped on nail rt ft about 1 month ago- healed per patient    SIMÓN (acute kidney injury) ([facility] [address] 2008 apx    kidney bruised due to fall / [place]    Allergic rhinitis     Chronic back pain     Depression     Diabetes mellitus ([address]     Difficulty sleeping     at times    Displacement of lumbar intervertebral disc without myelopathy     Fibromyalgia     Fractured rib     2008 / healed    H/O seasonal allergies     Head injury 1980'S apx    no residual s/s    Hyperlipidemia     Hypertension     Obesity (BMI 35.0-39.9 without comorbidity)     bmi 39.2  weight 296 #    Osteoarthritis     Thoracic or lumbosacral neuritis or radiculitis, unspecified        Past Surgical History:        Procedure Laterality Date    FOOT SURGERY Right 1985    to treat shattered bones    HERNIA REPAIR  2001    DOUBLE HERNIA    HERNIA REPAIR Right 12/9/2019    LAPAROSCOPIC RIGHT INGUINAL HERNIA REPAIR, MESH 10x15 cm PLACEMENT performed by Anu Gant MD at [facility] Left 4/11/2016    NECK SURGERY  2000    FUSION    VT COLONOSCOPY FLX DX W/COLLJ SPEC WHEN PFRMD N/A 5/7/2018    COLONOSCOPY DIAGNOSTIC performed by Sharan Sue MD at [facility] Left 2014    Dr. Thomas Ayala Left 11 21 14    SHOULDER SURGERY  2001 &2007    RIGHT AND LEFT repair of tears    TOTAL HIP ARTHROPLASTY Right 10/31/2016    Total right hip  Eva MD Magy    VASECTOMY      WRIST SURGERY  2007    LEFT WRIST       Medications Prior to Admission:    Prior to Admission medications    Medication Sig Start Date End Date Taking?  Authorizing Provider   enoxaparin (LOVENOX) 120 MG/0.8ML injection Inject 1 mg/kg into the skin every 12 hours   Yes Historical Provider, MD   insulin lispro (HUMALOG) 100 UNIT/ML injection cartridge Inject 5 Units into the skin 3 times daily (before meals)   Yes Historical Provider, MD   insulin glargine (LANTUS) 100 UNIT/ML injection vial Inject 20 Units into the skin nightly   Yes Historical Provider, MD   loperamide (LOPERAMIDE A-D) 2 MG tablet Take 2 mg by mouth 4 times daily as needed for Diarrhea   Yes Historical Provider, MD   Melatonin-Pyridoxine (MELATIN) 3-1 MG TABS Take 3 mg by mouth   Yes Historical Provider, MD   nystatin (MYCOSTATIN) 179003 UNIT/GM cream Apply topically 2 times daily Apply topically 2 times daily. Yes Historical Provider, MD   oxyCODONE-acetaminophen (PERCOCET) 5-325 MG per tablet Take 1 tablet by mouth every 6 hours as needed for Pain.    Yes Historical Provider, MD   pantoprazole (PROTONIX) 40 MG tablet Take 40 mg by mouth daily   Yes Historical Provider, MD   Cholecalciferol (VITAMIN D3) 1.25 MG (22983 UT) CAPS Take 1.25 capsules by mouth   Yes Historical Provider, MD   piperacillin-tazobactam (ZOSYN) 4.5 (4-0.5) g injection Inject 4.5 g into the muscle every 6 hours   Yes Historical Provider, MD   Continuous Blood Gluc Sensor ([address]) MISC Continuous Blood Gluc Sensor ([address]) MIS Continuous Blood Gluc Sensor ([address] [facility]) [facility] Indications: Type 2 diabetes mellitus without complication, without long-term current use of insulin (HCC) Check FBS daily and PRN 1 each 0 07/22/2019 Active 07- [address] (70505) 7/22/19  Yes Historical Provider, MD   fluticasone [place]) 50 MCG/ACT nasal spray instill 2 sprays into each nostril once daily 5/14/20  Yes IGOR Farris CNP   cyclobenzaprine (FLEXERIL) 10 MG tablet take 1 tablet by mouth three times a day for if needed for muscle spasm 5/14/20  Yes IGOR Farris CNP   fenofibrate (TRICOR) 54 MG tablet take 1 tablet by mouth once daily 5/14/20  Yes IGOR Farris CNP   aspirin (SM ASPIRIN ADULT LOW STRENGTH) 81 MG EC tablet take 1 tablet by mouth once daily 5/14/20  Yes IGOR Farris CNP   DULoxetine (CYMBALTA) 30 MG extended release capsule Take 1 capsule by mouth daily 5/14/20  Yes IGOR Farris CNP   lisinopril (PRINIVIL;ZESTRIL) 20 MG tablet take 1 tablet by mouth once daily 5/14/20  Yes IGOR Colbert CNP   ibuprofen (ADVIL;MOTRIN) 600 MG tablet take 1 tablet by mouth four times a day if needed for pain 5/11/20  Yes IGOR Colbert CNP   pregabalin (LYRICA) 300 MG capsule take 1 capsule by mouth twice a day 5/14/20 8/14/20  IGOR Colbert CNP   pravastatin (PRAVACHOL) 20 MG tablet Take 1 tablet by mouth nightly 5/14/20   IGOR Colbert CNP       Allergies:  Seasonal and Tramadol    Social History:   TOBACCO:   reports that he has never smoked. His smokeless tobacco use includes chew. ETOH:   reports previous alcohol use. OCCUPATION: n/a    Family History:       Problem Relation Age of Onset    Hypertension Mother     Arthritis Father     Diabetes Father     Cancer Maternal Grandfather         Skin    Cancer Paternal Uncle         skin    Diabetes Paternal Grandfather     Heart Disease Maternal Grandmother     Stroke Maternal Aunt        REVIEW OF SYSTEMS:    · Constitutional: No fever, no chills, no change in weight; good appetite  · HEENT: No blurred vision, no ear problems, no sore throat, no rhinorrhea. · Respiratory: No cough, no sputum production, no pleuritic chest pain, no shortness of breath  · Cardiology: No angina, no dyspnea on exertion, no paroxysmal nocturnal dyspnea, no orthopnea, no palpitation, no leg swelling. · Gastroenterology: No dysphagia, no reflux; +abdominal pain, no nausea or vomiting; no constipation or +diarrhea.  No hematochezia   · Genitourinary: No dysuria, no frequency, hesitancy; no hematuria  · Musculoskeletal: no joint pain, no myalgia, no change in range of movement  · Neurology: no focal weakness in extremities, no slurred speech, no double vision, no tingling or numbness sensation  · Endocrinology: no temperature intolerance, no polyphagia, polydipsia or polyuria  · Hematology: no increased bleeding, no bruising, no lymphadenopathy  · Skin: no skin changes noticed by patient  · Psychology: no depressed mood, no suicidal ideation    Physical Exam   · Vitals: /71   Pulse 102   Temp 98 °F (36.7 °C)   Resp 20   Ht 6' 1\" (1.854 m)   Wt 264 lb 8.8 oz (120 kg)   SpO2 98%   BMI 34.90 kg/m²           · General Appearance: alert and oriented to person, place and time, well developed and well- nourished, in no acute distress. Pleasant 53year old male. · Skin: warm and dry extremities, coccygeal wound      ·   · Head: normocephalic and atraumatic  · Eyes: pupils equal, round, and reactive to light, extraocular eye movements intact, conjunctivae normal  · ENT: tympanic membrane, external ear and ear canal normal bilaterally, nose without deformity, nasal mucosa and turbinates normal without polyps  · Neck: supple and non-tender without mass, no thyromegaly or thyroid nodules, no cervical lymphadenopathy  · Pulmonary/Chest: clear to auscultation bilaterally- no wheezes, rales or rhonchi, normal air movement, no respiratory distress  · Cardiovascular: tachycardic rate, regular rhythm, normal S1 and S2, no murmurs, rubs, clicks, or gallops, distal pulses intact, no carotid bruits  · Abdomen: soft, tender to palpation, non-distended, normal bowel sounds, no masses or organomegaly  · Extremities: no cyanosis, clubbing or edema  · Musculoskeletal: normal range of motion, no joint swelling, deformity or tenderness  · Neurologic: AAOx3; patient R hand weak and loss of movement compared to left side.      Lines     site day    Art line   L Radial 1   TLC None    PICC None    Hemoaccess None    Oxygen:     RA  Mechanical Ventilation:  N/a   ABG:   No results found for: PHART, PH, RHZ4MEY, PCO2, PO2ART, PO2, FEG6AMN, HCO3, BEART, BE, THGBART, THB, EBK3LSM, N0XTWXSH, O2SAT  Labs and Imaging Studies   Basic Labs  CBC:   Lab Results   Component Value Date    WBC 10.5 08/02/2020    RBC 3.34 08/02/2020    HGB 9.2 08/02/2020    HCT 29.8 08/02/2020    MCV 89.2 08/02/2020    RDW 15.1 2020     2020     CMP:  Lab Results   Component Value Date     2020    K 3.7 2020     2020    CO2 24 2020    BUN 8 2020    PROT 5.0 2020       Imaging Studies:     Ct Head Wo Contrast    Result Date: 2020  Patient MRN:  [id number] : 1975 Age: 53 years Gender: Male Order Date:  2020 12:13 PM EXAM: CT HEAD WO CONTRAST NUMBER OF IMAGES:  166 INDICATION:  Fall struck head Fall struck head COMPARISON: None TECHNIQUE:   Noncontrast CT of the head was performed. Coronal and sagittal reconstructions were obtained. Dose optimization techniques utilized including automatic exposure control and clarity iterative reconstruction. FINDINGS:   No hydrocephalus or extra-axial fluid collection seen. Brain parenchyma is unremarkable is no evidence of infarct, hemorrhage, or mass lesion. Basilar cisterns are preserved. Orbits are unremarkable. Osseous structures are intact. Paranasal sinuses and mastoid air cells are clear. No acute intracranial abnormality. Nm Hepatobiliary    Result Date: 2020  Patient MRN:  [id number] : 1975 Age: 53 years Gender: Male Order Date:  2020 9:10 AM EXAM: NM HEPATOBILIARY NUMBER OF IMAGES:  10 INDICATION:  cholecystitis COMPARISON: 2020 RADIOPHARMACEUTICAL ADMINISTERED: 9 mCi of Tc99m mebrofenin. TECHNIQUE: The patient was injected with radiopharmaceutical. Sequential anterior imaging of the abdomen was performed for 60 minutes. Delayed anterior and right lateral static images were obtained approximately 2 hours after injection. FINDINGS: There is prompt uptake of radiopharmaceutical in the liver. The small bowel is subsequently visualized. The gallbladder is not seen on initial or delayed images. Abnormal hepatobiliary scan with evidence of acute cholecystitis.      Ct Abdomen Pelvis W Iv Contrast Additional Contrast? Oral    Result Date: 2020  Patient MRN:  [id number] : 1975 Age: 53 years Gender: Male Order Date:  7/31/2020 1:04 PM EXAM: CT ABDOMEN PELVIS W IV CONTRAST number of images 411. Contrast. Isovue-370, 110 mL intravenous. Omnipaque 240, 50 mL oral Technique: Low-dose CT  acquisition technique included one of following options; 1 . Automated exposure control, 2. Adjustment of MA and or KV according to patient's size or 3. Use of iterative reconstruction. 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 The liver is fatty infiltrated. The gallbladder is partially distended with wall thickening concerning for cholecystitis. Spleen is not identified. A large complex cystic collection measuring 9 x 13 cm is identified in this splenic bed concerning for abscess. Pancreas, the adrenals and the kidneys are normal. There is significantly thickened inflamed and distended proximal jejunal loops with  mild inflammatory changes in the adjacent mesentery with the mesenteric lymph nodes. Pelvis. The bladder is partially distended with wall thickening. Please correlate with urinalysis. Bilateral hip prosthesis causing streak artifacts limiting the study. Colon is collapsed with wall thickening. There is normal appendix. Large decubitus ulcer is identified in the midline gluteus region with the large soft tissue defect measuring nearly 4 x 2.9 cm concerning for cellulitis and phlegmon. Diffusely thickened/inflamed and dilated jejunal loops concerning for enteritis. There is also mild colitis. Inflammatory changes and lymph nodes are identified in the mesentery. A large fluid collection the splenic bed concerning for abscess. Thickened gallbladder concerning for cholecystitis. Consider hepatobiliary scan. Large decubitus ulcer in the midline right gluteus region with cellulitis and phlegmon. The above findings were telephoned to the ordering physician.  ALERT:  THIS IS AN ABNORMAL REPORT    Xr Chest Portable    Result Date: 2020  Patient MRN: [id number] : 1975 Age:  53 years Gender: Male Order Date: 2020 12:00 PM Exam: XR CHEST PORTABLE Number of Images: 2; 1 view Indication:   Verify Left PICC Placement Comparison: 2020. Findings: Interval placement of left-sided PICC line overlying the superior vena cava, and the distal tip termination point is poorly visualized. The heart is unremarkable. The lung fields are unremarkable. The aorta is unremarkable. Cervical fusion hardware is noted     Left-sided PICC line, as described. No acute cardiopulmonary disease process is identified. The study was dictated by Renetta Kramer PA-C and Larry Steinberg MD reviewed and concurred with the findings. EKG: sinus tachycardia, accelerated  junctional rhythm. Resident's Assessment and Plan     Neurology:   AAOx3    Weakness R hand  - Pt has loss of movement of R hand.   - CT 2020 shows No acute intracranial abnormality. Cardiology:   Sepsis/shock, Likely 2/2 acute cholecystis   - BP lowest at 74/58 at 2100 on 2020  and tachycardia to 120 bpm. EKG shows tachycardic junctional rhythm. - Given Metoprolol 2.5 IV. HR dropped to 100s. Maintaining.   - WBC trending down 13.0 on admission -> 8.9, procalc 0.09,  CRP 3.9  - Culture of coccyx wound sent out. Follow. Do not suspect this is cause of sepsis as it is not draining. However if patient worsens, may need to consider.   - Lactic 2.1  - Fluid given 1 L given during RRT; will given another 2 L and reassess fluid response. - Vasopressor: started on Levophed 10 mcg/min to maintain MAP > 65. Wean down vasopressor. MAP improving.   - Abx: on Zosyn   - ID following   - No A-line necessary as MAP improving and weaning off. No central line at the moment   - Trend lactic acid.  Trend down 2.1 -> 0.7.  - Continue evaluate fluid responsiveness, vasopressor   - Trend troponin x3: 0.08 -> 0.02  - Cardiology recs appreciated     HLD  - continue fenofibrate     HTN:  - will care services and times documented are independent of procedures and multidisciplinary rounds with Residents. Additionally comprehensive, multidisciplinary rounds were conducted with the MICU team. The case was discussed in detail and plans for care were established. Review of Residents documentation was conducted and revisions were made as appropriate. I agree with the above documented exam, problem list and plan of care. Sepsis   Possible cholecystitis for IR drainage  ?  [address]   abx   EMMANUELLE CHI

## 2020-08-02 NOTE — PROGRESS NOTES
[address] [place] [address] [facility]  Progress Note    SUBJECTIVE:  Chief Complaint   Patient presents with    Abnormal CT     sent from [place] with abnormal CT done today, only c/o are pain from pressure ulcers on coccyx     Events noted. The patient was transferred to the ICU last night because of hypotension. Currently on vasopressors. Still complaining of diffuse abdominal pain. No nausea or vomiting. No diarrhea. Review of systems:  As stated above in the chief complaint, otherwise negative. Medications:  Scheduled Meds:   DULoxetine  30 mg Oral Daily    fenofibrate  54 mg Oral Daily    fluticasone  1 spray Each Nostril Daily    insulin glargine  20 Units Subcutaneous Nightly    insulin lispro  5 Units Subcutaneous TID WC    [Held by provider] lisinopril  20 mg Oral Daily    pantoprazole  40 mg Oral QAM AC    pravastatin  20 mg Oral Nightly    pregabalin  300 mg Oral BID    insulin lispro  0-10 Units Subcutaneous 4x Daily WC    sodium chloride flush  10 mL Intravenous 2 times per day    enoxaparin  1 mg/kg Subcutaneous BID    piperacillin-tazobactam  3.375 g Intravenous Q8H    sodium chloride   Intravenous Q8H    potassium chloride  20 mEq Oral Daily     Continuous Infusions:   norepinephrine 5 mcg/min (20 0650)    dextrose      sodium chloride Stopped (20 0306)     PRN Meds:cyclobenzaprine, melatonin, glucose, dextrose, glucagon (rDNA), dextrose, sodium chloride flush, acetaminophen **OR** acetaminophen, polyethylene glycol, promethazine **OR** ondansetron, morphine, oxyCODONE-acetaminophen    OBJECTIVE:  /74   Pulse 106   Temp 97.4 °F (36.3 °C) (Axillary)   Resp 19   Ht 6' 1\" (1.854 m)   Wt 229 lb (103.9 kg)   SpO2 97%   BMI 30.21 kg/m²   Temp  Av.6 °F (36.4 °C)  Min: 97.1 °F (36.2 °C)  Max: 98.1 °F (36.7 °C)  Constitutional: The patient is awake, alert, and oriented. Lying in bed and in no distress. He is in the ICU.   Skin: Warm and dry. No rashes were noted. HEENT: Round and reactive pupils. Moist mucous membranes. No ulcerations or thrush. Neck: Supple to movements. Chest: No use of accessory muscles to breathe. Symmetrical expansion. No wheezing, crackles or rhonchi. Cardiovascular: S1 and S2 are rhythmic and regular. No murmurs appreciated. Abdomen: Positive bowel sounds to auscultation. Diffuse tenderness to palpation. No masses. No rebound tenderness. Coccygeal wound clean, measuring approximately 3 cm in diameter and undermining. No surrounding erythema. Extremities: Minimal edema. Lines: Left PICC (outside facility).     Laboratory and Tests Review:  Lab Results   Component Value Date    WBC 10.5 08/02/2020    WBC 8.9 08/01/2020    WBC 13.0 (H) 08/01/2020    HGB 9.2 (L) 08/02/2020    HCT 29.8 (L) 08/02/2020    MCV 89.2 08/02/2020     (H) 08/02/2020     Lab Results   Component Value Date    NEUTROABS 5.78 08/02/2020    NEUTROABS 4.72 08/01/2020    NEUTROABS 8.45 (H) 08/01/2020     No results found for: [facility]  Lab Results   Component Value Date    ALT 5 08/02/2020    AST <5 08/02/2020    ALKPHOS 93 08/02/2020    BILITOT <0.2 08/02/2020     Lab Results   Component Value Date     08/02/2020    K 3.7 08/02/2020     08/02/2020    CO2 24 08/02/2020    BUN 8 08/02/2020    CREATININE 0.9 08/02/2020    CREATININE 1.3 08/01/2020    CREATININE 0.7 08/01/2020    GFRAA >60 08/02/2020    LABGLOM >60 08/02/2020    GLUCOSE 129 08/02/2020    GLUCOSE 125 05/11/2012    PROT 5.0 08/02/2020    LABALBU 2.5 08/02/2020    LABALBU 4.8 05/11/2012    CALCIUM 8.5 08/02/2020    BILITOT <0.2 08/02/2020    ALKPHOS 93 08/02/2020    AST <5 08/02/2020    ALT 5 08/02/2020     Lab Results   Component Value Date    CRP 3.9 (H) 08/01/2020    CRP 7.6 (H) 06/30/2019     Lab Results   Component Value Date    SEDRATE 28 (H) 06/30/2019     Radiology:  No new imaging studies    Microbiology:   Coccygeal wound 6/26/2020 [facility]

## 2020-08-02 NOTE — PROGRESS NOTES
Platelet              WBC             Hb  5-11-20         248,000             10                    14  6-1-20           295,000             15                     12  7-31-20         1,190,000          9.5                  11  8-1-20            1,034,000         13                    10.2  8-2-20            915,000  10.5         Hb=9.2    creatinine 0.9     A/P:   53year old gentleman with a normal cbc May 2020 platelets were 304,170 hb 14 and wbc was 10 at that time. He reportedly was admitted to [facility] since that time with PE and splenic infarct followed by abscess/liquificatin of spleen. He reportedly was discharged from [facility] 2 to 3 weeks ago on lovenox and never has failed oral anticoagulation. Upon admission he was found to have thrombocytosis with platelet count 2,930,337 on 7-31-20 and CT abd/pelvis showing large collection fluid in splenic bed ?cystic vs abscess, possible cholecystitis, distended small bowel and sacral dec ulceration/infection. He was started on zosyn and thrombocytosis is improving with today platelet count 079,657. HIDA scan showed cholecystitis. Thrombocytosis due to infection and functional loss of spleen improving with antibiotics.   1. Cbc daily, thrombocytosis should improved with treatment of cholecystitis and sacral decub infection  2. Currently on lovenox for PE, will need switched to heparin gtt if needs to have surgery/cholecystectomy, after recovered from surgery he could be transitioned to oral anticoagulation  3.   Management of cholecystitis per surgery   4. sacral decub infection, antibiotics per I.D. and evaluation if splenic bed fluid cyst vs abscess    Yasir Monzon

## 2020-08-02 NOTE — PROGRESS NOTES
[facility]   ATTENDING PHYSICIAN PROGRESS NOTE     I have examined the patient, reviewed the record, and discussed the case with the APN/ Resident. I have reviewed all relevant labs and imaging data. The following summarizes my clinical findings and independent assessment. CC: splenic abscess    Pt moved to MICU yesterday for hypotension/tachycardia--started on levophed. HIDA positive. Pt reports pain in area of decub and some ongoing abd pain.     Awake and alert  Follows commands  Hrt:  Regular  Lungs:  Fairly clear bilaterally  Abd:  Soft; BS active; NT/ND  Skin:  Warm/dry    Patient Active Problem List    Diagnosis Date Noted    Decubitus ulcer of sacral area 08/01/2020    Abnormal findings on diagnostic imaging of gall bladder 08/01/2020    Splenic infarction 08/01/2020    Cyst of spleen 08/01/2020    Splenic abscess 08/01/2020    Anemia 08/01/2020    Pulmonary embolism ([facility] 08/01/2020    Enterocolitis 07/31/2020    Rectus diastasis 11/01/2019    Recurrent unilateral inguinal hernia 11/01/2019    Cellulitis of sacral region 07/02/2019    Cellulitis of foot 06/30/2019    Neuropathy 06/30/2019    Cellulitis, wound, post-operative 06/30/2019    Left leg swelling 06/30/2019    SIRS (systemic inflammatory response syndrome) ([address] 06/30/2019    Primary osteoarthritis of right hip 11/03/2016    Primary osteoarthritis of left hip 04/11/2016    Type 2 diabetes mellitus ([facility] [address] 04/08/2016    Lumbar disc herniation 02/22/2016    Lumbar radiculopathy 12/17/2015    Osteoarthritis of spine with radiculopathy, lumbar region 12/17/2015    Class 1 obesity in adult 12/17/2015    Allergic rhinitis 11/23/2015    Essential hypertension 10/16/2015    Vitamin D deficiency 04/14/2015    Fibromyalgia 12/15/2014    Insomnia 07/04/2014    Osteoarthritis 03/27/2014    Lumbar spondylosis 01/07/2014    Neuropathic pain 05/08/2012       Splenic fluid collection--cont to monitor  Acalculous cholecystitis--will consult IR for perc adriano tube--I feel gallbladder is more likely a source of his sepsis/hypotension than splenic fluid collection. Splenic fluid collection also seems smaller in size when measurements are compared to [facility] reports--recommend continue to monitor this.   Thrombocytosis--secondary to splenectomy--if persists, start ASA  Sacral decub--local wound care    Anaid Flores MD, FACS  8/2/2020  11:58 AM

## 2020-08-02 NOTE — PROGRESS NOTES
Hospitalist Progress Note      PCP: Camilo Aparicio, APRN - CNP    Date of Admission: 7/31/2020    Chief Complaint:   Abd pain, and scar al decubiti     Hospital Course: ** apparently had been in a [facility] for rehab due to frankie in a COMA,  He has a sacral decubiti, it was thought to be infected. He has a wound vac, and a splenic abscess and PE with splenic v thrombosis. His platelets were extremely elevated. Seen by hematology, on full dose of lovenox. Last pm he became more hypotensive and tachycardic. He was transferred to MICU.     Subjective: **asleep      Medications:  Reviewed    Infusion Medications    norepinephrine 3 mcg/min (08/02/20 1300)    heparin (porcine)      dextrose      sodium chloride Stopped (08/02/20 0306)     Scheduled Medications    insulin lispro  0-12 Units Subcutaneous Q4H    heparin (porcine)  80 Units/kg Intravenous Once    DULoxetine  30 mg Oral Daily    fenofibrate  54 mg Oral Daily    fluticasone  1 spray Each Nostril Daily    insulin glargine  20 Units Subcutaneous Nightly    [Held by provider] insulin lispro  5 Units Subcutaneous TID WC    [Held by provider] lisinopril  20 mg Oral Daily    pantoprazole  40 mg Oral QAM AC    pravastatin  20 mg Oral Nightly    pregabalin  300 mg Oral BID    [Held by provider] insulin lispro  0-10 Units Subcutaneous 4x Daily WC    sodium chloride flush  10 mL Intravenous 2 times per day    piperacillin-tazobactam  3.375 g Intravenous Q8H    sodium chloride   Intravenous Q8H    potassium chloride  20 mEq Oral Daily     PRN Meds: perflutren lipid microspheres, heparin (porcine), heparin (porcine), cyclobenzaprine, melatonin, glucose, dextrose, glucagon (rDNA), dextrose, sodium chloride flush, acetaminophen **OR** acetaminophen, polyethylene glycol, promethazine **OR** ondansetron, morphine, oxyCODONE-acetaminophen      Intake/Output Summary (Last 24 hours) at 8/2/2020 1521  Last data filed at 8/2/2020 0557  Gross per 24 hour Intake 3439 ml   Output 2925 ml   Net 514 ml       Exam:    /71   Pulse 102   Temp 98 °F (36.7 °C)   Resp 20   Ht 6' 1\" (1.854 m)   Wt 264 lb 8.8 oz (120 kg)   SpO2 98%   BMI 34.90 kg/m²           Gen: *well developed  HEENT: NC/AT, moist mucous membranes, no oropharyngeal erythema or exudate  Neck: supple, trachea midline, no anterior cervical or SC LAD  Heart:  Normal s1/s2, RRR, no murmurs, gallops, or rubs.    Lungs:  *cta * bilaterally,   Abd: bowel sounds present, soft, nontender, nondistended, no masses  Extrem:  No clubbing, cyanosis,  *pos* edema  Skin: no rashes or lesions  Psych: asleep}  Neuro: CN 2-12  grossly intact,   Capillary Refill: Brisk,< 3 seconds   Peripheral Pulses: +2 palpable, equal bilaterally               Labs:   Recent Labs     08/01/20 0457 08/01/20 2217 08/02/20  0530   WBC 13.0* 8.9 10.5   HGB 10.2* 9.6* 9.2*   HCT 32.7* 30.9* 29.8*   PLT 1,034* 922* 915*     Recent Labs     08/01/20 0457 08/01/20 2217 08/02/20  0610    137 139   K 3.8 3.9 3.7    101 104   CO2 26 24 24   BUN 5* 11 8   CREATININE 0.7 1.3* 0.9   CALCIUM 8.9 8.7 8.5*   PHOS  --  4.3  --      Recent Labs     07/31/20 1725 08/01/20 2217 08/02/20  0610   AST 8 5 <5   ALT 7 5 5   BILITOT <0.2 <0.2 <0.2   ALKPHOS 93 84 93     Recent Labs     07/31/20 1725 08/02/20  1230   INR 1.2 1.2     Recent Labs     08/01/20 2217 08/02/20  0530 08/02/20  1230   TROPONINI 0.08* 0.02 0.07*     Recent Labs     07/31/20 1725 08/01/20 2217 08/02/20  0610   AST 8 5 <5   ALT 7 5 5   BILITOT <0.2 <0.2 <0.2   ALKPHOS 93 84 93     Recent Labs     07/31/20  1750 08/01/20  2217 08/02/20  0530   LACTA 1.7 2.1 0.7     No results found for: Tonny Cottrell  Lab Results   Component Value Date    AMMONIA 22.0 08/02/2020       Assessment:    Active Hospital Problems    Diagnosis Date Noted    Decubitus ulcer of sacral area [L89.159] 08/01/2020    Abnormal findings on diagnostic imaging of gall bladder [R93.2] 08/01/2020    Splenic infarction [D73.5] 08/01/2020    Cyst of spleen [D73.4] 08/01/2020    Splenic abscess [D73.3] 08/01/2020    Anemia [D64.9] 08/01/2020    Pulmonary embolism ([facility] [I26.99] 08/01/2020    Enterocolitis [K52.9] 07/31/2020    Cellulitis of sacral region [L03.319] 07/02/2019    Type 2 diabetes mellitus ([facility] [E11.9] 04/08/2016    Class 1 obesity in adult [E66.9] 12/17/2015    Essential hypertension [I10] 10/16/2015       Plan:  * cont cymbalta   cont heparin   cont ssi   cont zestril   cont zosyn      DVT Prophylaxis: *heparin  Diet: Diet NPO Effective Now Exceptions are: Sips with Meds  Code Status: Full Code    PT/OT Eval Status:  When stable    Dispo - *Brea      Electronically signed by Alma Delia Magaña DO on 8/2/2020 at 3:21 PM [facility]

## 2020-08-02 NOTE — PROGRESS NOTES
Called because patient hypotensive and transferred to the medical ICU. Patient does complain of diffuse abdominal pain along with pain in the right upper quadrant. Patient given 2 L and was on levo at 10 when I walked in the room. Pressures were in the 110s when I was in the room. No nausea no vomiting. Moving bowels. Tolerating diet. Medicine team ordered HIDA scan which showed no filling of the gallbladder. Patient previously thought to be treated for enteritis now looks more like a septic type picture with the gallbladder is the most likely source. Abdomen: Soft, diffusely tender to palpation but especially in the right upper quadrant. Mascorro sign positive. Back: Wound inspected. Sacral decubitus ulcer stage IV. Healthy skin tissue and bleeding. No concerns of source of sepsis. Assessment: Looks to be a septic picture with gallbladder is most likely source. Plan: Trend lactate  30 cc/kg bolus given  Continue Zosyn  We will discuss with team in the morning plans moving forward. 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       Electronically signed by Zuri Payton MD on 8/2/2020 at 3:19 AM

## 2020-08-02 NOTE — PLAN OF CARE
Problem: Pain:  Goal: Pain level will decrease  Description: Pain level will decrease  Outcome: Met This Shift     Problem: Pain:  Goal: Control of acute pain  Description: Control of acute pain  Outcome: Met This Shift     Problem: Pain:  Goal: Control of chronic pain  Description: Control of chronic pain  Outcome: Met This Shift

## 2020-08-02 NOTE — PROGRESS NOTES
Called in to do an image-guided percutaneous cholecystostomy drain placement for this patient as ordered by Dr. Lizzie Hernandez for acalculous cholecystitis. Patient has an allergy to tramadol and is diabetic. History of splenic infarction/splenic abscess. Spoke with Virgilio Landrum RN. Last given Lovenox 100 mg SQ 8/1 at 2122. Lovenox needs to be held x1 day. Dr. Rosemarie Balderas spoke with attending and patient is being switched to heparin IV. Per IR guidelines, heparin IV needs to be held x4 hours prior to procedure, patient will need to be NPO after midnight. Plan is to get the patient down to IR tomorrow morning. Will follow up tomorrow morning.       /71   Pulse 102   Temp 98 °F (36.7 °C)   Resp 20   Ht 6' 1\" (1.854 m)   Wt 264 lb 8.8 oz (120 kg)   SpO2 98%   BMI 34.90 kg/m²      LABS:   Lab Results   Component Value Date    INR 1.2 08/02/2020    PROTIME 13.8 (H) 08/02/2020      Lab Results   Component Value Date    CREATININE 0.9 08/02/2020    BUN 8 08/02/2020     Lab Results   Component Value Date    HGB 8.6 (L) 08/02/2020    HCT 28.0 (L) 08/02/2020     (H) 08/02/2020

## 2020-08-02 NOTE — PLAN OF CARE
Discussed with General Surgery  Pt will have IR cholecystostomy today   Will stop therapeutic Lovenox and start Heparin drip for PE  Will switch back to lovenox vs [facility] after procedure     Fabian Alford MD, PGY-3  Internal Medicine Resident

## 2020-08-02 NOTE — PROGRESS NOTES
Comprehensive Nutrition Assessment    Type and Reason for Visit:  Positive Nutrition Screen, Initial    Nutrition Recommendations/Plan: Advance diet when medically appropriate. Recommend Ensure High Protein supplement TID when diet advances to help meet increased nutritional needs from wound healing. Kraig wound healing supplement not indicated at this time d/t noted sepsis. Nutrition Assessment:  Patient at nutritional risk d/t NPO/clears x 2 days since admission ; pt at further nutritional compromise AEB increased needs from wound healing ; noted sepsis; pt adm w/ colitis ; will start ONS when diet advances    Malnutrition Assessment:  Malnutrition Status: At risk for malnutrition (Comment)    Context:  Acute Illness     Findings of the 6 clinical characteristics of malnutrition:  Energy Intake:  Mild decrease in energy intake (Comment)(x 2 days since admission)  Weight Loss:  Unable to assess(d/t lack of weight history)     Body Fat Loss:  Unable to assess(data not available to assess at this time)     Muscle Mass Loss:  Unable to assess(data not available to assess at this time)    Fluid Accumulation:  No significant fluid accumulation     Strength:  Not Performed    Estimated Daily Nutrient Needs:  Energy (kcal):  4878-8343 (REE 2134 x 1.2 SF); Weight Used for Energy Requirements:  Current     Protein (g):  125-150 (1.5-1.8g/kg IBW);  Weight Used for Protein Requirements:  Ideal        Fluid (ml/day):  7956-8003; Weight Used for Fluid Requirements:  [place]      Nutrition Related Findings:  +I&Os (+1.5 L), no edema, active BS, distended/tender abd, loose stools, poor dentition, A&O x 4, obesity      Wounds:  Multiple, Open Wounds, Wound Consult Pending(wounds x 2 noted)       Current Nutrition Therapies:    Diet NPO Effective Now Exceptions are: Sips with Meds    Anthropometric Measures:  · Height: 6' 1\" (185.4 cm)  · Current Body Weight: 264 lb (119.7 kg)(8/2/20, no method)   · Admission Body Weight: 315 lb (142.9 kg)(7/31/20, no method ; will not use)    · Usual Body Weight: (unable to obtain ; no actual weights available in EMR history from previous encounters ; EMR shows past weight of 297# no method on 10/21/19)     · Ideal Body Weight: 184 lbs; % Ideal Body Weight 143.5 %   · BMI: 34.8   · BMI Categories: Obese Class 1 (BMI 30.0-34. 9)       Nutrition Diagnosis:   · Increased nutrient needs related to increase demand for energy/nutrients as evidenced by wounds      Nutrition Interventions:   Food and/or Nutrient Delivery:  Continue NPO, Start Oral Nutrition Supplement  Nutrition Education/Counseling:  Education not indicated   Coordination of Nutrition Care:  Continued Inpatient Monitoring    Goals:  Advance diet when medically appropriate       Nutrition Monitoring and Evaluation:   Behavioral-Environmental Outcomes:  Knowledge or Skill   Food/Nutrient Intake Outcomes:  Diet Advancement/Tolerance  Physical Signs/Symptoms Outcomes:  Biochemical Data, Chewing or Swallowing, GI Status, Fluid Status or Edema, Diarrhea, Hemodynamic Status, Meal Time Behavior, Nutrition Focused Physical Findings, Skin, Weight     Discharge Planning:     Too soon to determine     Electronically signed by Belinda Acevedo RD, LD on 8/2/20 at 12:26 PM EDT    Contact: [phone]

## 2020-08-02 NOTE — PROGRESS NOTES
INPATIENT CARDIOLOGY FOLLOW-UP    Name: Sandra Patient    Age: 53 y.o. Date of Admission: 7/31/2020  5:16 PM    Date of Service: 8/2/2020    Primary Cardiologist: Known to me from this admission    Chief Complaint: Follow-up for tachycardia    Interim History:  EKG from yesterday revealing a narrow complex tachycardia without identifiable P waves, possibly accelerated junctional tachycardia versus a supraventricular tachycardia. He was moved to the ICU for hypotension and started on norepinephrine infusion. He has subsequently converted to sinus tachycardia in the low 100s. At this time complaining of pain around the decubitus ulcer, abdominal pain is improved. Denies chest pain shortness of breath or palpitations.     Review of Systems:   Negative except as described above    Problem List:  Patient Active Problem List   Diagnosis    Neuropathic pain    Lumbar spondylosis    Osteoarthritis    Insomnia    Fibromyalgia    Vitamin D deficiency    Essential hypertension    Allergic rhinitis    Lumbar radiculopathy    Osteoarthritis of spine with radiculopathy, lumbar region    Class 1 obesity in adult    Lumbar disc herniation    Type 2 diabetes mellitus ([facility]    Primary osteoarthritis of left hip    Primary osteoarthritis of right hip    Cellulitis of foot    Neuropathy    Cellulitis, wound, post-operative    Left leg swelling    SIRS (systemic inflammatory response syndrome) (HCC)    Cellulitis of sacral region    Rectus diastasis    Recurrent unilateral inguinal hernia    Enterocolitis    Decubitus ulcer of sacral area    Abnormal findings on diagnostic imaging of gall bladder    Splenic infarction    Cyst of spleen    Splenic abscess    Anemia    Pulmonary embolism (HCC)       Current Medications:    Current Facility-Administered Medications:     norepinephrine (LEVOPHED) 16 mg in dextrose 5% 250 mL infusion, 5 mcg/min, Intravenous, Continuous, Marti Bassett Intramuscular, PRN, Philippe Ayala MD    dextrose 5 % solution, 100 mL/hr, Intravenous, PRN, Philippe Ayala MD    sodium chloride flush 0.9 % injection 10 mL, 10 mL, Intravenous, 2 times per day, Philippe Ayala MD, 10 mL at 08/02/20 0904    sodium chloride flush 0.9 % injection 10 mL, 10 mL, Intravenous, PRN, Philippe Ayala MD, 10 mL at 08/01/20 1124    acetaminophen (TYLENOL) tablet 650 mg, 650 mg, Oral, Q6H PRN, 650 mg at 08/01/20 1954 **OR** acetaminophen (TYLENOL) suppository 650 mg, 650 mg, Rectal, Q6H PRN, Philippe Ayala MD    polyethylene glycol (GLYCOLAX) packet 17 g, 17 g, Oral, Daily PRN, Philippe Ayala MD    promethazine (PHENERGAN) tablet 12.5 mg, 12.5 mg, Oral, Q6H PRN **OR** ondansetron (ZOFRAN) injection 4 mg, 4 mg, Intravenous, Q6H PRN, Philippe Ayala MD    [Held by provider] enoxaparin (LOVENOX) injection 100 mg, 1 mg/kg, Subcutaneous, BID, Philippe Ayala MD, 100 mg at 08/01/20 2122    piperacillin-tazobactam (ZOSYN) 3.375 g in dextrose 5 % 100 mL IVPB extended infusion (mini-bag), 3.375 g, Intravenous, Q8H, Tyson Alvares MD, Last Rate: 25 mL/hr at 08/02/20 1003, 3.375 g at 08/02/20 1003    0.9 % sodium chloride infusion admixture, , Intravenous, Q8H, Philippe Ayala MD, Stopped at 08/02/20 0830    potassium chloride (KLOR-CON M) extended release tablet 20 mEq, 20 mEq, Oral, Daily, Karson Wilkins, DO, 20 mEq at 08/02/20 0858    0.9 % sodium chloride infusion, 1,000 mL, Intravenous, Continuous, Arian Monson MD, Stopped at 08/02/20 0306    morphine (PF) injection 2 mg, 2 mg, Intravenous, Q4H PRN, Philippe Ayala MD, 2 mg at 08/02/20 4863    oxyCODONE-acetaminophen (PERCOCET) 5-325 MG per tablet 1 tablet, 1 tablet, Oral, Q6H PRN, Philippe Ayala MD, 1 tablet at 08/02/20 3700    Physical Exam:  /71   Pulse 102   Temp 98 °F (36.7 °C)   Resp 20   Ht 6' 1\" (1.854 m)   Wt 264 lb 8.8 oz (120 kg)   SpO2 98%   BMI 34.90 kg/m²   Wt Readings from Last 3 Encounters:   08/02/20 264 lb 8.8 oz (120 kg)   05/28/20 (!) 315 lb (142.9 kg)   05/14/20 (!) 316 lb (143.3 kg)     Appearance: Awake, no acute respiratory distress  Skin: Intact, no rash  Head: Normocephalic, atraumatic  Eyes: EOMI, no conjunctival erythema  ENMT: No pharyngeal erythema, MMM, no rhinorrhea  Neck: Supple, no elevated JVP, no carotid bruits  Lungs: Clear to auscultation bilaterally. No wheezes, rales, or rhonchi. Cardiac: PMI nondisplaced, Regular rhythm with a borderline tachycardic rate, S1 & S2 normal, no murmurs  Abdomen: Mildly distended. Soft, mildly tender, +bowel sounds  Extremities: Moves all extremities x 4, no lower extremity edema  Decubitus ulcer  Neurologic: No focal motor deficits apparent, normal mood and affect  Peripheral Pulses: Intact posterior tibial pulses bilaterally    Intake/Output:    Intake/Output Summary (Last 24 hours) at 8/2/2020 1125  Last data filed at 8/2/2020 0557  Gross per 24 hour   Intake 4364 ml   Output 3275 ml   Net 1089 ml     No intake/output data recorded.     Laboratory Tests:  Recent Labs     08/01/20 0457 08/01/20 2217 08/02/20  0610    137 139   K 3.8 3.9 3.7    101 104   CO2 26 24 24   BUN 5* 11 8   CREATININE 0.7 1.3* 0.9   GLUCOSE 109* 132* 129*   CALCIUM 8.9 8.7 8.5*     Lab Results   Component Value Date    MG 1.7 01/09/2020     Recent Labs     07/31/20  1725 08/01/20 2217 08/02/20  0610   ALKPHOS 93 84 93   ALT 7 5 5   AST 8 5 <5   PROT 6.3* 5.3* 5.0*   BILITOT <0.2 <0.2 <0.2   LABALBU 3.0* 2.6* 2.5*     Recent Labs     08/01/20  0457 08/01/20 2217 08/02/20  0530   WBC 13.0* 8.9 10.5   RBC 3.64* 3.45* 3.34*   HGB 10.2* 9.6* 9.2*   HCT 32.7* 30.9* 29.8*   MCV 89.8 89.6 89.2   MCH 28.0 27.8 27.5   MCHC 31.2* 31.1* 30.9*   RDW 15.0 15.2* 15.1*   PLT 1,034* 922* 915*   MPV 8.4 8.2 8.5     Lab Results   Component Value Date    CKTOTAL 120 11/26/2013    CKMB 1.4 11/26/2013    TROPONINI 0.02 08/02/2020    TROPONINI 0.08 (H) 08/01/2020 TROPONINI <0.01 11/26/2013     Lab Results   Component Value Date    INR 1.2 07/31/2020    PROTIME 12.9 (H) 07/31/2020     Lab Results   Component Value Date    TSH 1.300 08/01/2020     Lab Results   Component Value Date    LABA1C 6.3 (H) 08/01/2020     No results found for: EAG  Lab Results   Component Value Date    CHOL 148 06/01/2020    CHOL 208 (H) 10/21/2019    CHOL 201 (H) 03/11/2019     Lab Results   Component Value Date    TRIG 252 (H) 06/01/2020    TRIG 260 (H) 10/21/2019    TRIG 307 (H) 03/11/2019     Lab Results   Component Value Date    HDL 35 06/01/2020    HDL 36 10/21/2019    HDL 35 03/11/2019     Lab Results   Component Value Date    LDLCALC 63 06/01/2020    LDLCALC 120 (H) 10/21/2019    LDLCALC 105 (H) 03/11/2019     Lab Results   Component Value Date    LABVLDL 50 06/01/2020    LABVLDL 52 10/21/2019    LABVLDL 61 03/11/2019     No results found for: CHOLHDLRATIO  No results for input(s): PROBNP in the last 72 hours. Cardiac Tests:    EKG:   EKG from yesterday showing narrow complex tachycardia with rates in the 130s without identifiable P waves, possible exaggerated junctional rhythm versus an SVT    This morning EKG showing Sinus tachycardia 107 bpm with normal axis normal intervals. No ST-T wave changes. Possible prior inferior infarct. Telemetry: Converted from SVT/JT to sinus tachycardia    Telemetry: Tachycardic to the 140s, initially appeared to be sinus tachycardia     Chest X-ray:   8/1/2020       Findings:   Interval placement of left-sided PICC line overlying the superior vena   cava, and the distal tip termination point is poorly visualized. The heart is unremarkable. The lung fields are unremarkable. The aorta is unremarkable. Cervical fusion hardware is noted         Impression:         Left-sided PICC line, as described.    No acute cardiopulmonary disease process is identified.       HIDA scan 8/1/2012    FINDINGS:   There is prompt uptake of radiopharmaceutical in the liver. The small   bowel is subsequently visualized. The gallbladder is not seen on   initial or delayed images.        Impression:         Abnormal hepatobiliary scan with evidence of acute cholecystitis. Echocardiogram: N/A     Stress test: N/A     Cardiac catheterization: N/A      ----------------------------------------------------------------------------------------------------------------------------------------------------------------  IMPRESSION:  1. Tachycardia. Initially narrow complex 130s to 150s without identifiable P waves. Possible junctional tachycardia versus SVT. Has resolved, converted to sinus tachycardia in the low 100s. This is all likely due to his underlying sepsis. 2. Sepsis/septic shock, now on norepinephrine in ICU  3. Acute cholecystitis  4. Enterocolitis  5. Infected sacral decubitus ulcer  6. Recent pulmonary embolism, treated with enoxaparin  7. Recent splenic vein thrombosis and splenic infarction  8. Severe thrombocytosis platelets 2862-> 694  9. Anemia Hgb 9.2  10. Comorbid disease: Obesity, type 2 diabetes, hypertension    RECOMMENDATIONS:  Tachycardia likely triggered by all of his acute issues. Now back in a sinus tachycardia in the low 100s.  2D echocardiogram pending   Otherwise treatment will be directed at underlying sepsis   Further plans per primary/ICU/surgery/ID/hematology   Consider starting beta-blocker once hemodynamically stable off pressors, but tachycardia should resolve with resolution of sepsis   Further recommendations/surgical risk stratification will be made after review of the echocardiogram   Otherwise will see as needed, please call with questions    Bijal Gallardo MD  [facility]) Cardiology    NOTE: This report was transcribed using voice recognition software. Every effort was made to ensure accuracy; however, inadvertent computerized transcription errors may be present.

## 2020-08-03 ENCOUNTER — APPOINTMENT (OUTPATIENT)
Dept: CT IMAGING | Age: 45
DRG: 720 | End: 2020-08-03
Payer: MEDICAID

## 2020-08-03 LAB
APTT: 34.6 SEC (ref 24.5–35.1)
BASOPHILS ABSOLUTE: 0.09 E9/L (ref 0–0.2)
BASOPHILS RELATIVE PERCENT: 0.9 % (ref 0–2)
EOSINOPHILS ABSOLUTE: 0.47 E9/L (ref 0.05–0.5)
EOSINOPHILS RELATIVE PERCENT: 4.6 % (ref 0–6)
HCT VFR BLD CALC: 26.4 % (ref 37–54)
HEMOGLOBIN: 8 G/DL (ref 12.5–16.5)
HIV-1 AND HIV-2 ANTIBODIES: NORMAL
IMMATURE GRANULOCYTES #: 0.04 E9/L
IMMATURE GRANULOCYTES %: 0.4 % (ref 0–5)
LV EF: 63 %
LVEF MODALITY: NORMAL
LYMPHOCYTES ABSOLUTE: 2.71 E9/L (ref 1.5–4)
LYMPHOCYTES RELATIVE PERCENT: 26.3 % (ref 20–42)
MCH RBC QN AUTO: 27.2 PG (ref 26–35)
MCHC RBC AUTO-ENTMCNC: 30.3 % (ref 32–34.5)
MCV RBC AUTO: 89.8 FL (ref 80–99.9)
METER GLUCOSE: 100 MG/DL (ref 74–99)
METER GLUCOSE: 217 MG/DL (ref 74–99)
METER GLUCOSE: 261 MG/DL (ref 74–99)
METER GLUCOSE: 81 MG/DL (ref 74–99)
METER GLUCOSE: 94 MG/DL (ref 74–99)
MONOCYTES ABSOLUTE: 1.23 E9/L (ref 0.1–0.95)
MONOCYTES RELATIVE PERCENT: 12 % (ref 2–12)
NEUTROPHILS ABSOLUTE: 5.75 E9/L (ref 1.8–7.3)
NEUTROPHILS RELATIVE PERCENT: 55.8 % (ref 43–80)
PDW BLD-RTO: 15.1 FL (ref 11.5–15)
PLATELET # BLD: 795 E9/L (ref 130–450)
PMV BLD AUTO: 8.3 FL (ref 7–12)
RBC # BLD: 2.94 E12/L (ref 3.8–5.8)
WBC # BLD: 10.3 E9/L (ref 4.5–11.5)

## 2020-08-03 PROCEDURE — 2500000003 HC RX 250 WO HCPCS: Performed by: RADIOLOGY

## 2020-08-03 PROCEDURE — 6360000002 HC RX W HCPCS: Performed by: RADIOLOGY

## 2020-08-03 PROCEDURE — C1729 CATH, DRAINAGE: HCPCS

## 2020-08-03 PROCEDURE — 87070 CULTURE OTHR SPECIMN AEROBIC: CPT

## 2020-08-03 PROCEDURE — 93306 TTE W/DOPPLER COMPLETE: CPT

## 2020-08-03 PROCEDURE — 99232 SBSQ HOSP IP/OBS MODERATE 35: CPT | Performed by: INTERNAL MEDICINE

## 2020-08-03 PROCEDURE — 99291 CRITICAL CARE FIRST HOUR: CPT | Performed by: INTERNAL MEDICINE

## 2020-08-03 PROCEDURE — 6370000000 HC RX 637 (ALT 250 FOR IP): Performed by: FAMILY MEDICINE

## 2020-08-03 PROCEDURE — 87075 CULTR BACTERIA EXCEPT BLOOD: CPT

## 2020-08-03 PROCEDURE — 6360000002 HC RX W HCPCS: Performed by: INTERNAL MEDICINE

## 2020-08-03 PROCEDURE — 2580000003 HC RX 258: Performed by: FAMILY MEDICINE

## 2020-08-03 PROCEDURE — 85025 COMPLETE CBC W/AUTO DIFF WBC: CPT

## 2020-08-03 PROCEDURE — 87205 SMEAR GRAM STAIN: CPT

## 2020-08-03 PROCEDURE — 2580000003 HC RX 258: Performed by: INTERNAL MEDICINE

## 2020-08-03 PROCEDURE — 6360000004 HC RX CONTRAST MEDICATION: Performed by: INTERNAL MEDICINE

## 2020-08-03 PROCEDURE — 81270 JAK2 GENE: CPT

## 2020-08-03 PROCEDURE — 81402 MOPATH PROCEDURE LEVEL 3: CPT

## 2020-08-03 PROCEDURE — 36592 COLLECT BLOOD FROM PICC: CPT

## 2020-08-03 PROCEDURE — 2000000000 HC ICU R&B

## 2020-08-03 PROCEDURE — 6370000000 HC RX 637 (ALT 250 FOR IP): Performed by: INTERNAL MEDICINE

## 2020-08-03 PROCEDURE — 85730 THROMBOPLASTIN TIME PARTIAL: CPT

## 2020-08-03 PROCEDURE — 6360000002 HC RX W HCPCS: Performed by: FAMILY MEDICINE

## 2020-08-03 PROCEDURE — 82962 GLUCOSE BLOOD TEST: CPT

## 2020-08-03 PROCEDURE — 36415 COLL VENOUS BLD VENIPUNCTURE: CPT

## 2020-08-03 PROCEDURE — 81219 CALR GENE COM VARIANTS: CPT

## 2020-08-03 RX ORDER — FENTANYL CITRATE 50 UG/ML
INJECTION, SOLUTION INTRAMUSCULAR; INTRAVENOUS
Status: COMPLETED | OUTPATIENT
Start: 2020-08-03 | End: 2020-08-03

## 2020-08-03 RX ORDER — LIDOCAINE HYDROCHLORIDE 20 MG/ML
INJECTION, SOLUTION INFILTRATION; PERINEURAL
Status: COMPLETED | OUTPATIENT
Start: 2020-08-03 | End: 2020-08-03

## 2020-08-03 RX ORDER — ONDANSETRON 2 MG/ML
INJECTION INTRAMUSCULAR; INTRAVENOUS
Status: COMPLETED | OUTPATIENT
Start: 2020-08-03 | End: 2020-08-03

## 2020-08-03 RX ADMIN — FENTANYL CITRATE 25 MCG: 50 INJECTION, SOLUTION INTRAMUSCULAR; INTRAVENOUS at 11:01

## 2020-08-03 RX ADMIN — INSULIN LISPRO 6 UNITS: 100 INJECTION, SOLUTION INTRAVENOUS; SUBCUTANEOUS at 05:35

## 2020-08-03 RX ADMIN — OXYCODONE AND ACETAMINOPHEN 1 TABLET: 5; 325 TABLET ORAL at 03:50

## 2020-08-03 RX ADMIN — PRAVASTATIN SODIUM 20 MG: 20 TABLET ORAL at 21:05

## 2020-08-03 RX ADMIN — SODIUM CHLORIDE, PRESERVATIVE FREE 10 ML: 5 INJECTION INTRAVENOUS at 05:29

## 2020-08-03 RX ADMIN — INSULIN GLARGINE 20 UNITS: 100 INJECTION, SOLUTION SUBCUTANEOUS at 21:05

## 2020-08-03 RX ADMIN — MORPHINE SULFATE 2 MG: 2 INJECTION, SOLUTION INTRAMUSCULAR; INTRAVENOUS at 18:17

## 2020-08-03 RX ADMIN — LIDOCAINE HYDROCHLORIDE 13 ML: 20 INJECTION, SOLUTION INFILTRATION; PERINEURAL at 10:48

## 2020-08-03 RX ADMIN — FENTANYL CITRATE 25 MCG: 50 INJECTION, SOLUTION INTRAMUSCULAR; INTRAVENOUS at 10:30

## 2020-08-03 RX ADMIN — PERFLUTREN 1.65 MG: 6.52 INJECTION, SUSPENSION INTRAVENOUS at 15:01

## 2020-08-03 RX ADMIN — MORPHINE SULFATE 2 MG: 2 INJECTION, SOLUTION INTRAMUSCULAR; INTRAVENOUS at 02:48

## 2020-08-03 RX ADMIN — PIPERACILLIN AND TAZOBACTAM 3.38 G: 3; .375 INJECTION, POWDER, FOR SOLUTION INTRAVENOUS at 02:48

## 2020-08-03 RX ADMIN — OXYCODONE AND ACETAMINOPHEN 1 TABLET: 5; 325 TABLET ORAL at 15:15

## 2020-08-03 RX ADMIN — SODIUM CHLORIDE: 9 INJECTION, SOLUTION INTRAVENOUS at 07:00

## 2020-08-03 RX ADMIN — OXYCODONE AND ACETAMINOPHEN 1 TABLET: 5; 325 TABLET ORAL at 22:01

## 2020-08-03 RX ADMIN — INSULIN LISPRO 4 UNITS: 100 INJECTION, SOLUTION INTRAVENOUS; SUBCUTANEOUS at 21:04

## 2020-08-03 RX ADMIN — PIPERACILLIN AND TAZOBACTAM 3.38 G: 3; .375 INJECTION, POWDER, FOR SOLUTION INTRAVENOUS at 18:10

## 2020-08-03 RX ADMIN — SODIUM CHLORIDE, PRESERVATIVE FREE 10 ML: 5 INJECTION INTRAVENOUS at 04:00

## 2020-08-03 RX ADMIN — Medication 10 ML: at 21:05

## 2020-08-03 RX ADMIN — MORPHINE SULFATE 2 MG: 2 INJECTION, SOLUTION INTRAMUSCULAR; INTRAVENOUS at 22:53

## 2020-08-03 RX ADMIN — PIPERACILLIN AND TAZOBACTAM 3.38 G: 3; .375 INJECTION, POWDER, FOR SOLUTION INTRAVENOUS at 10:10

## 2020-08-03 RX ADMIN — PREGABALIN 300 MG: 100 CAPSULE ORAL at 21:05

## 2020-08-03 RX ADMIN — ONDANSETRON HYDROCHLORIDE 4 MG: 2 INJECTION, SOLUTION INTRAMUSCULAR; INTRAVENOUS at 11:15

## 2020-08-03 RX ADMIN — Medication 10 ML: at 08:25

## 2020-08-03 RX ADMIN — MORPHINE SULFATE 2 MG: 2 INJECTION, SOLUTION INTRAMUSCULAR; INTRAVENOUS at 06:55

## 2020-08-03 RX ADMIN — HEPARIN SODIUM 18 UNITS/KG/HR: 10000 INJECTION, SOLUTION INTRAVENOUS at 18:25

## 2020-08-03 ASSESSMENT — PAIN DESCRIPTION - ONSET
ONSET: ON-GOING

## 2020-08-03 ASSESSMENT — PAIN DESCRIPTION - PROGRESSION

## 2020-08-03 ASSESSMENT — PAIN - FUNCTIONAL ASSESSMENT
PAIN_FUNCTIONAL_ASSESSMENT: PREVENTS OR INTERFERES SOME ACTIVE ACTIVITIES AND ADLS

## 2020-08-03 ASSESSMENT — PAIN DESCRIPTION - FREQUENCY
FREQUENCY: CONTINUOUS

## 2020-08-03 ASSESSMENT — PAIN DESCRIPTION - ORIENTATION
ORIENTATION: MID

## 2020-08-03 ASSESSMENT — PAIN DESCRIPTION - PAIN TYPE
TYPE: CHRONIC PAIN

## 2020-08-03 ASSESSMENT — PAIN SCALES - GENERAL
PAINLEVEL_OUTOF10: 8
PAINLEVEL_OUTOF10: 3
PAINLEVEL_OUTOF10: 8
PAINLEVEL_OUTOF10: 6
PAINLEVEL_OUTOF10: 6
PAINLEVEL_OUTOF10: 10
PAINLEVEL_OUTOF10: 7
PAINLEVEL_OUTOF10: 10
PAINLEVEL_OUTOF10: 10
PAINLEVEL_OUTOF10: 8
PAINLEVEL_OUTOF10: 6
PAINLEVEL_OUTOF10: 6
PAINLEVEL_OUTOF10: 5
PAINLEVEL_OUTOF10: 0

## 2020-08-03 ASSESSMENT — PAIN DESCRIPTION - DESCRIPTORS
DESCRIPTORS: ACHING;DISCOMFORT
DESCRIPTORS: THROBBING
DESCRIPTORS: THROBBING
DESCRIPTORS: ACHING;DISCOMFORT
DESCRIPTORS: THROBBING

## 2020-08-03 ASSESSMENT — PAIN DESCRIPTION - LOCATION
LOCATION: ABDOMEN;COCCYX;BACK
LOCATION: ABDOMEN;COCCYX;BACK
LOCATION: COCCYX;BACK
LOCATION: COCCYX;BACK;ABDOMEN
LOCATION: COCCYX;BACK

## 2020-08-03 NOTE — CARE COORDINATION
Patient remains in MICU on 3L nc and heparin gtt. Cholecystostomy tube placed in IR today. I met with patient and his dad Usha Cheek at the bedside to discuss transition of care at discharge. Patient came to us from [address] [place] [address] and if [facility] is appropriate he is agreeable to returning to this facility at discharge. Patient states he was only at [facility] for 2 weeks and was admitted to our hospital on the vent and getting HD for a few weeks prior to that. He doesn't currently have insurance listed but patient states he has [employer]. Last insurance card scanned into computer shows [employer] ID# [id number]; [phone]. emailed this info to our verifiers. If LTAC is appropriate at discharge he choiced for [facility]- will give Ainsley Suarez referral in am. CM/SW will continue to follow for discharge planning.

## 2020-08-03 NOTE — PRE SEDATION
Sedation Pre-Procedure Note    Patient Name: Myra Anthony II   YOB: 1975  Room/Bed: 4527/6472-W  Medical Record Number: [id number]  Date: 8/3/2020   Time: 10:38 AM       Indication:  Cholecystostomy    Consent: I have discussed with the patient and/or the patient representative the indication, alternatives, and the possible risks and/or complications of the planned procedure and the anesthesia methods. The patient and/or patient representative appear to understand and agree to proceed. Vital Signs:   Vitals:    08/03/20 1035   BP: 116/71   Pulse: 109   Resp: 12   Temp:    SpO2: 96%       Past Medical History:   has a past medical history of Accident, SIMÓN (acute kidney injury) ([facility] [address] Allergic rhinitis, Chronic back pain, Depression, Diabetes mellitus ([facility] [address] Difficulty sleeping, Displacement of lumbar intervertebral disc without myelopathy, Fibromyalgia, Fractured rib, H/O seasonal allergies, Head injury, Hyperlipidemia, Hypertension, Obesity (BMI 35.0-39.9 without comorbidity), Osteoarthritis, and Thoracic or lumbosacral neuritis or radiculitis, unspecified. Past Surgical History:   has a past surgical history that includes Neck surgery (2000); shoulder surgery (2001 &2007); Wrist surgery (2007); Rotator cuff repair (Left, 2014); hernia repair (2001); Shoulder arthroscopy (Left, 11 21 14); Vasectomy; Hip Arthroplasty (Left, 4/11/2016); Total hip arthroplasty (Right, 10/31/2016); Foot surgery (Right, 1985); pr colonoscopy flx dx w/collj spec when pfrmd (N/A, 5/7/2018); and hernia repair (Right, 12/9/2019).     Medications:   Scheduled Meds:    insulin lispro  0-12 Units Subcutaneous Q4H    sodium chloride flush  10 mL Intravenous 2 times per day    DULoxetine  30 mg Oral Daily    fenofibrate  54 mg Oral Daily    fluticasone  1 spray Each Nostril Daily    insulin glargine  20 Units Subcutaneous Nightly    [Held by provider] insulin lispro  5 Units Subcutaneous TID WC    [Held by provider] Cholecalciferol (VITAMIN D3) 1.25 MG (44664 UT) CAPS Take 1.25 capsules by mouth   Yes Historical Provider, MD   piperacillin-tazobactam (ZOSYN) 4.5 (4-0.5) g injection Inject 4.5 g into the muscle every 6 hours   Yes Historical Provider, MD   Continuous Blood Gluc Sensor ([address]) [facility] Continuous Blood Gluc Sensor ([facility]) [facility] Continuous Blood Gluc Sensor ([address] [facility]) [facility] Indications: Type 2 diabetes mellitus without complication, without long-term current use of insulin (HCC) Check FBS daily and PRN 1 each 0 07/22/2019 Active 07- Viru 65 (47864) 7/22/19  Yes Historical Provider, MD   fluticasone Gissellehaneil Hilariousen) 50 MCG/ACT nasal spray instill 2 sprays into each nostril once daily 5/14/20  Yes IGOR Long CNP   cyclobenzaprine (FLEXERIL) 10 MG tablet take 1 tablet by mouth three times a day for if needed for muscle spasm 5/14/20  Yes IGOR Long CNP   fenofibrate (TRICOR) 54 MG tablet take 1 tablet by mouth once daily 5/14/20  Yes IGOR Long CNP   aspirin (SM ASPIRIN ADULT LOW STRENGTH) 81 MG EC tablet take 1 tablet by mouth once daily 5/14/20  Yes IGOR Long CNP   DULoxetine (CYMBALTA) 30 MG extended release capsule Take 1 capsule by mouth daily 5/14/20  Yes IGOR Long CNP   lisinopril (PRINIVIL;ZESTRIL) 20 MG tablet take 1 tablet by mouth once daily 5/14/20  Yes IGOR Long CNP   ibuprofen (ADVIL;MOTRIN) 600 MG tablet take 1 tablet by mouth four times a day if needed for pain 5/11/20  Yes IGOR Long CNP   pregabalin (LYRICA) 300 MG capsule take 1 capsule by mouth twice a day 5/14/20 8/14/20  IGOR Long CNP   pravastatin (PRAVACHOL) 20 MG tablet Take 1 tablet by mouth nightly 5/14/20   IGOR Long CNP     Coumadin Use Last 7 Days:  no  Antiplatelet drug therapy use last 7 days: no  Other anticoagulant use

## 2020-08-03 NOTE — PROGRESS NOTES
[address] [place] [address] [facility]  Progress Note    SUBJECTIVE:  Chief Complaint   Patient presents with    Abnormal CT     sent from [place] with abnormal CT done today, only c/o are pain from pressure ulcers on coccyx     No new complaints today. He still has some diffuse abdominal discomfort. Cholecystostomy tube was placed. Tolerating antibiotics. Review of systems:  As stated above in the chief complaint, otherwise negative.     Medications:  Scheduled Meds:   insulin lispro  0-12 Units Subcutaneous Q4H    sodium chloride flush  10 mL Intravenous 2 times per day    DULoxetine  30 mg Oral Daily    fenofibrate  54 mg Oral Daily    fluticasone  1 spray Each Nostril Daily    insulin glargine  20 Units Subcutaneous Nightly    [Held by provider] insulin lispro  5 Units Subcutaneous TID WC    [Held by provider] lisinopril  20 mg Oral Daily    pantoprazole  40 mg Oral QAM AC    pravastatin  20 mg Oral Nightly    pregabalin  300 mg Oral BID    [Held by provider] insulin lispro  0-10 Units Subcutaneous 4x Daily WC    piperacillin-tazobactam  3.375 g Intravenous Q8H    sodium chloride   Intravenous Q8H    potassium chloride  20 mEq Oral Daily     Continuous Infusions:   norepinephrine Stopped (20 1821)    heparin (porcine) Stopped (20 0400)    dextrose      sodium chloride Stopped (20 0306)     PRN Meds:perflutren lipid microspheres, heparin (porcine), heparin (porcine), sodium chloride flush, heparin flush, cyclobenzaprine, melatonin, glucose, dextrose, glucagon (rDNA), dextrose, acetaminophen **OR** acetaminophen, polyethylene glycol, promethazine **OR** ondansetron, morphine, oxyCODONE-acetaminophen    OBJECTIVE:  /60   Pulse 109   Temp 97.7 °F (36.5 °C) (Temporal)   Resp 17   Ht 6' 1\" (1.854 m)   Wt 255 lb 11.7 oz (116 kg)   SpO2 97%   BMI 33.74 kg/m²   Temp  Av.7 °F (36.5 °C)  Min: 97.5 °F (36.4 °C)  Max: 98 °F (36.7 °C)  Constitutional: The patient is awake, alert, and oriented. Lying in bed and in no distress. He is in the ICU. In the ICU. Lying in bed. No distress. Skin: Warm and dry. No rashes were noted. HEENT: Round and reactive pupils. Moist mucous membranes. No ulcerations or thrush. Neck: Supple to movements. Chest: No respiratory distress. Good breath sounds. No crackles. Cardiovascular: S1 and S2 are rhythmic and regular. No murmurs appreciated. Abdomen: Positive bowel sounds to auscultation. Diffuse tenderness to palpation. No masses. No rebound tenderness. Right upper quadrant cholecystostomy tube with bilious fluid. Coccygeal wound clean, measuring approximately 3 cm in diameter and undermining. No surrounding erythema. Extremities: Minimal edema. Lines: Left PICC (outside facility) placed around first week of July 2020.     Laboratory and Tests Review:  Lab Results   Component Value Date    WBC 10.3 08/03/2020    WBC 8.0 08/02/2020    WBC 10.5 08/02/2020    HGB 8.0 (L) 08/03/2020    HCT 26.4 (L) 08/03/2020    MCV 89.8 08/03/2020     (H) 08/03/2020     Lab Results   Component Value Date    NEUTROABS 5.75 08/03/2020    NEUTROABS 5.78 08/02/2020    NEUTROABS 4.72 08/01/2020     No results found for: [facility]  Lab Results   Component Value Date    ALT 5 08/02/2020    AST <5 08/02/2020    ALKPHOS 93 08/02/2020    BILITOT <0.2 08/02/2020     Lab Results   Component Value Date     08/02/2020    K 3.7 08/02/2020     08/02/2020    CO2 24 08/02/2020    BUN 8 08/02/2020    CREATININE 0.9 08/02/2020    CREATININE 1.3 08/01/2020    CREATININE 0.7 08/01/2020    GFRAA >60 08/02/2020    LABGLOM >60 08/02/2020    GLUCOSE 129 08/02/2020    GLUCOSE 125 05/11/2012    PROT 5.0 08/02/2020    LABALBU 2.5 08/02/2020    LABALBU 4.8 05/11/2012    CALCIUM 8.5 08/02/2020    BILITOT <0.2 08/02/2020    ALKPHOS 93 08/02/2020    AST <5 08/02/2020    ALT 5 08/02/2020     Lab Results   Component Value Date CRP 3.9 (H) 08/01/2020    CRP 7.6 (H) 06/30/2019     Lab Results   Component Value Date    SEDRATE 28 (H) 06/30/2019     Radiology:  No new imaging studies    Microbiology:   Coccygeal wound 6/26/2020 [facility] Prevotella species, Fusobacterium, Klebsiella oxytoca, Enterobacter cloacae  Blood cultures at [facility] all negative  Blood cultures 7/31/2020: Negative so far  Coccyx wound culture 8/1/2020: Corynebacterium, GNR  Biliary fluid 8/3/2020: Pending    Recent Labs     08/01/20  0457   PROCAL 0.09*  0.08       ASSESSMENT:  · Acute cholecystitis. Status post CT-guided cholecystostomy tube placement 8/3/2020. · History of pulmonary embolism, treated at [facility]  · Large fluid collection over the splenic bed. Possible abscess  · Leukocytosis associated to the above  · Stage IV sacral decubitus ulcer. Colonized but not infected    PLAN:  · Continue Zosyn alone  · No need to treat organisms in the decubitus ulcer  · Check final cultures  · Consider CT-guided drainage of left upper quadrant fluid collection if no improvement now that he has a cholecystostomy tube.   I do agree with surgery that cholecystitis explains the clinical picture    Eloy Gonzalez  8:57 AM  8/3/2020

## 2020-08-03 NOTE — PROGRESS NOTES
Subjective: The patient is awake and alert in bed, says he just came back from procedure, having some mild abdominal discomfort at this time. No fever or chills. Denies GI or  blood loss. No problems overnight. Denies chest pain, angina, dyspnea or abdominal discomfort. No nausea or vomiting. Tolerating diet. Objective:    /73   Pulse 111   Temp 97.7 °F (36.5 °C) (Temporal)   Resp 13   Ht 6' 1\" (1.854 m)   Wt 255 lb 11.7 oz (116 kg)   SpO2 98%   BMI 33.74 kg/m²     General: NAD  HEENT: No thrush or mucositis, EOMI, PERRLA  Heart: Sinus tachy, no murmurs, gallops, or rubs. Lungs:  CTA bilaterally, no wheeze, rales or rhonchi  Abd: bowel sounds present, RUQ tenderness, nondistended, no masses, R choley drain intact, bilious  drainage noted.   Extrem: RUE weakness, no clubbing, cyanosis, or edema  Lymphatics: No palpable adenopathy in cervical and supraclavicular regions  Skin: Intact, no petechia or purpura    CBC with Differential:    Lab Results   Component Value Date    WBC 10.3 08/03/2020    RBC 2.94 08/03/2020    HGB 8.0 08/03/2020    HCT 26.4 08/03/2020     08/03/2020    MCV 89.8 08/03/2020    MCH 27.2 08/03/2020    MCHC 30.3 08/03/2020    RDW 15.1 08/03/2020    NRBC 0.9 08/01/2020    SEGSPCT 80 11/26/2013    LYMPHOPCT 26.3 08/03/2020    MONOPCT 12.0 08/03/2020    BASOPCT 0.9 08/03/2020    MONOSABS 1.23 08/03/2020    LYMPHSABS 2.71 08/03/2020    EOSABS 0.47 08/03/2020    BASOSABS 0.09 08/03/2020     CMP:    Lab Results   Component Value Date     08/02/2020    K 3.7 08/02/2020     08/02/2020    CO2 24 08/02/2020    BUN 8 08/02/2020    CREATININE 0.9 08/02/2020    GFRAA >60 08/02/2020    LABGLOM >60 08/02/2020    GLUCOSE 129 08/02/2020    GLUCOSE 125 05/11/2012    PROT 5.0 08/02/2020    LABALBU 2.5 08/02/2020    LABALBU 4.8 05/11/2012    CALCIUM 8.5 08/02/2020    BILITOT <0.2 08/02/2020    ALKPHOS 93 08/02/2020    AST <5 08/02/2020    ALT 5 08/02/2020        Current Clover Lozano MD, 20 mg at 08/01/20 0848    melatonin tablet 3 mg, 3 mg, Oral, Nightly PRN, Eleazar Gillespie MD    pantoprazole (PROTONIX) tablet 40 mg, 40 mg, Oral, QAM AC, Eleazar Gillespie MD, 40 mg at 08/01/20 0612    pravastatin (PRAVACHOL) tablet 20 mg, 20 mg, Oral, Nightly, Eleazar Gillespie MD, 20 mg at 08/02/20 2110    pregabalin (LYRICA) capsule 300 mg, 300 mg, Oral, BID, Eleazar Gillespie MD, 300 mg at 08/02/20 2109    [Held by provider] insulin lispro (HUMALOG) injection vial 0-10 Units, 0-10 Units, Subcutaneous, 4x Daily WC, Eleazar Gillespie MD, Stopped at 08/02/20 0858    glucose (GLUTOSE) 40 % oral gel 15 g, 15 g, Oral, PRN, Eleazar Gillespie MD    dextrose 50 % IV solution, 12.5 g, Intravenous, PRN, Eleazar Gillespie MD    glucagon (rDNA) injection 1 mg, 1 mg, Intramuscular, PRN, Eleazar Gillespie MD    dextrose 5 % solution, 100 mL/hr, Intravenous, PRN, Eleazar Gillespie MD    acetaminophen (TYLENOL) tablet 650 mg, 650 mg, Oral, Q6H PRN, 650 mg at 08/01/20 1954 **OR** acetaminophen (TYLENOL) suppository 650 mg, 650 mg, Rectal, Q6H PRN, Eleazar Gillespie MD    polyethylene glycol (GLYCOLAX) packet 17 g, 17 g, Oral, Daily PRN, Eleazar Gillespie MD    promethazine (PHENERGAN) tablet 12.5 mg, 12.5 mg, Oral, Q6H PRN **OR** ondansetron (ZOFRAN) injection 4 mg, 4 mg, Intravenous, Q6H PRN, Eleazar Gillespie MD    piperacillin-tazobactam (ZOSYN) 3.375 g in dextrose 5 % 100 mL IVPB extended infusion (mini-bag), 3.375 g, Intravenous, Q8H, Tyson Alvares MD, Last Rate: 25 mL/hr at 08/03/20 1010, 3.375 g at 08/03/20 1010    0.9 % sodium chloride infusion admixture, , Intravenous, Q8H, Tyson Alvares MD, Stopped at 08/03/20 0751    potassium chloride (KLOR-CON M) extended release tablet 20 mEq, 20 mEq, Oral, Daily, Jerrod Garza, DO, 20 mEq at 08/02/20 0858    0.9 % sodium chloride infusion, 1,000 mL, Intravenous, Continuous, Yousif Pereira MD, Stopped at 08/02/20 0306    morphine (PF) injection follow. Electronically signed by IGOR Cowan NP on 8/3/2020 at 10:55 AM     Attending Addendum:     Patient seen and examined personally. Reviewed pertinent labs and imaging reports. Progress note has been updated to reflect my changes. Agree with the note above. Okay for oral anticoagulation after cleared to start by surgery. Thrombocytosis likely will persist due to asplenia, but should be at reduced level after improves from acute issues.       Maria Dolores Ruiz MD  Medical Oncologist/Hematologist  [facility]

## 2020-08-03 NOTE — PROGRESS NOTES
Hospitalist Progress Note      PCP: IGOR Parker - CNP    Date of Admission: 7/31/2020    Chief Complaint: **Abd pain, and scaral decubiti      Hospital Course: ** apparently had been in a [facility] for rehab due to frankie in a COMA,  He has a sacral decubiti, it was thought to be infected. He has a wound vac, and a splenic abscess and PE with splenic v thrombosis. His platelets were extremely elevated. Seen by hematology, on full dose of lovenox. Last pm he became more hypotensive and tachycardic. He was transferred to MICU. ** he went for surgery today, has a drain in RUQ draining clear yellow drainage, he had an IR cholecystostomy tube.        Subjective: **right shoulder pain      Medications:  Reviewed    Infusion Medications    norepinephrine Stopped (08/02/20 1821)    heparin (porcine) Stopped (08/03/20 0400)    dextrose      sodium chloride Stopped (08/02/20 0306)     Scheduled Medications    insulin lispro  0-12 Units Subcutaneous Q4H    sodium chloride flush  10 mL Intravenous 2 times per day    DULoxetine  30 mg Oral Daily    fenofibrate  54 mg Oral Daily    fluticasone  1 spray Each Nostril Daily    insulin glargine  20 Units Subcutaneous Nightly    [Held by provider] insulin lispro  5 Units Subcutaneous TID WC    [Held by provider] lisinopril  20 mg Oral Daily    pantoprazole  40 mg Oral QAM AC    pravastatin  20 mg Oral Nightly    pregabalin  300 mg Oral BID    [Held by provider] insulin lispro  0-10 Units Subcutaneous 4x Daily WC    piperacillin-tazobactam  3.375 g Intravenous Q8H    sodium chloride   Intravenous Q8H    potassium chloride  20 mEq Oral Daily     PRN Meds: iopamidol, heparin (porcine), heparin (porcine), sodium chloride flush, heparin flush, cyclobenzaprine, melatonin, glucose, dextrose, glucagon (rDNA), dextrose, acetaminophen **OR** acetaminophen, polyethylene glycol, promethazine **OR** ondansetron, morphine, oxyCODONE-acetaminophen      Intake/Output Summary (Last 24 hours) at 8/3/2020 1621  Last data filed at 8/3/2020 1511  Gross per 24 hour   Intake 1587 ml   Output 2505 ml   Net -918 ml       Exam:    /76   Pulse 104   Temp 97.7 °F (36.5 °C) (Temporal)   Resp 21   Ht 6' 1\" (1.854 m)   Wt 255 lb 11.7 oz (116 kg)   SpO2 98%   BMI 33.74 kg/m²       Gen: *well developed  HEENT: NC/AT, moist mucous membranes, no oropharyngeal erythema or exudate  Neck: supple, trachea midline, no anterior cervical or SC LAD  Heart:  Normal s1/s2, RRR, no murmurs, gallops, or rubs. Lungs:  *cta * bilaterally,   Abd: bowel sounds present, soft, pos tender, nondistended, no masses right cholecystostomy tube. Draining yellow drainage.    Extrem:  No clubbing, cyanosis,  *pos* edema  Skin: no rashes or lesions  Psych: asleep}  Neuro: CN 2-12  grossly intact,   Capillary Refill: Brisk,< 3 seconds   Peripheral Pulses: +2 palpable, equal bilaterally              Labs:   Recent Labs     08/02/20  0530 08/02/20  1530 08/03/20  0529   WBC 10.5 8.0 10.3   HGB 9.2* 8.6* 8.0*   HCT 29.8* 28.0* 26.4*   * 795* 795*     Recent Labs     08/01/20  0457 08/01/20 2217 08/02/20  0610    137 139   K 3.8 3.9 3.7    101 104   CO2 26 24 24   BUN 5* 11 8   CREATININE 0.7 1.3* 0.9   CALCIUM 8.9 8.7 8.5*   PHOS  --  4.3  --      Recent Labs     07/31/20  1725 08/01/20 2217 08/02/20  0610   AST 8 5 <5   ALT 7 5 5   BILITOT <0.2 <0.2 <0.2   ALKPHOS 93 84 93     Recent Labs     07/31/20  1725 08/02/20  1230   INR 1.2 1.2     Recent Labs     08/01/20 2217 08/02/20  0530 08/02/20  1230   TROPONINI 0.08* 0.02 0.07*     Recent Labs     07/31/20  1725 08/01/20  2217 08/02/20  0610   AST 8 5 <5   ALT 7 5 5   BILITOT <0.2 <0.2 <0.2   ALKPHOS 93 84 93     Recent Labs     07/31/20  1750 08/01/20 2217 08/02/20  0530   LACTA 1.7 2.1 0.7     No results found for: Jasmin Hunter  Lab Results   Component Value Date    AMMONIA 22.0 08/02/2020       Assessment:    Active Hospital Problems

## 2020-08-03 NOTE — PROGRESS NOTES
Contacted Neftali Ivory DO from General Surgery regarding restarting Heparin and diet. No response or new orders at this time.

## 2020-08-03 NOTE — PROGRESS NOTES
0845Patient arrived via icu bed  with rn and transport   to Radiology department for         Percutaneous trans hepatic cholecystostomy tube      . Allergies, home medications, H&P and fasting instructions reviewed with patient. Vital signs taken. Procedural instructions given, questions answered, understanding expressed and consent signed. Patient given fluoroscopy education, no questions at this time. 1148 report called to sahra aden.   1200 more calm reports nausea subsiding.

## 2020-08-03 NOTE — PROGRESS NOTES
Occupational Therapy    OT consult received to eval/treat and chart review complete. Patient on hold, off unit for IR upon attempt this AM. OT to re-attempt at a later time. Thank you.        Angel Escalona OTR/L  [id number]

## 2020-08-03 NOTE — PLAN OF CARE
Problem: Pain:  Goal: Pain level will decrease  Description: Pain level will decrease  Outcome: Met This Shift  Goal: Control of acute pain  Description: Control of acute pain  Outcome: Met This Shift     Problem: Falls - Risk of:  Goal: Will remain free from falls  Description: Will remain free from falls  Outcome: Met This Shift  Goal: Absence of physical injury  Description: Absence of physical injury  Outcome: Met This Shift     Problem: Skin Integrity:  Goal: Absence of new skin breakdown  Description: Absence of new skin breakdown  Outcome: Met This Shift

## 2020-08-03 NOTE — PROGRESS NOTES
[facility]  ATTENDING PHYSICIAN PROGRESS NOTE      when I went to see patient this AM, he was off the floor for his IR cholecystostomy tube. Will resee later     Willem Zuniga MD, FACS  8/3/2020  4:20 PM    NOTE: This report was transcribed using voice recognition software. Every effort was made to ensure accuracy; however, inadvertent computerized transcription errors may be present.

## 2020-08-03 NOTE — PROGRESS NOTES
Floor called and spoke with ASHLEY Quach. Levophed drip is off, patient is stable.   /60   Pulse 104   Temp 97.5 °F (36.4 °C) (Temporal)   Resp 20   Ht 6' 1\" (1.854 m)   Wt 255 lb 11.7 oz (116 kg)   SpO2 99%   BMI 33.74 kg/m²    Patient is on 3L NC and is able to provide his own consent   Heparin drip has been off since 4 am.

## 2020-08-04 LAB
ANAEROBIC CULTURE: NORMAL
ANION GAP SERPL CALCULATED.3IONS-SCNC: 12 MMOL/L (ref 7–16)
APTT: 48.5 SEC (ref 24.5–35.1)
APTT: 76.9 SEC (ref 24.5–35.1)
APTT: >240 SEC (ref 24.5–35.1)
BASOPHILS ABSOLUTE: 0.06 E9/L (ref 0–0.2)
BASOPHILS RELATIVE PERCENT: 0.6 % (ref 0–2)
BUN BLDV-MCNC: 7 MG/DL (ref 6–20)
CALCIUM SERPL-MCNC: 8.6 MG/DL (ref 8.6–10.2)
CHLORIDE BLD-SCNC: 97 MMOL/L (ref 98–107)
CO2: 27 MMOL/L (ref 22–29)
CREAT SERPL-MCNC: 0.8 MG/DL (ref 0.7–1.2)
CYTOMEGALOVIRUS IGG ANTIBODY: NORMAL
CYTOMEGALOVIRUS IGM ANTIBODY: NORMAL
EOSINOPHILS ABSOLUTE: 0.35 E9/L (ref 0.05–0.5)
EOSINOPHILS RELATIVE PERCENT: 3.7 % (ref 0–6)
GFR AFRICAN AMERICAN: >60
GFR NON-AFRICAN AMERICAN: >60 ML/MIN/1.73
GLUCOSE BLD-MCNC: 233 MG/DL (ref 74–99)
GRAM STAIN ORDERABLE: NORMAL
HCT VFR BLD CALC: 28.7 % (ref 37–54)
HEMOGLOBIN: 8.8 G/DL (ref 12.5–16.5)
IMMATURE GRANULOCYTES #: 0.03 E9/L
IMMATURE GRANULOCYTES %: 0.3 % (ref 0–5)
LYMPHOCYTES ABSOLUTE: 2.26 E9/L (ref 1.5–4)
LYMPHOCYTES RELATIVE PERCENT: 23.8 % (ref 20–42)
MCH RBC QN AUTO: 27.3 PG (ref 26–35)
MCHC RBC AUTO-ENTMCNC: 30.7 % (ref 32–34.5)
MCV RBC AUTO: 89.1 FL (ref 80–99.9)
METER GLUCOSE: 116 MG/DL (ref 74–99)
METER GLUCOSE: 133 MG/DL (ref 74–99)
METER GLUCOSE: 140 MG/DL (ref 74–99)
METER GLUCOSE: 232 MG/DL (ref 74–99)
METER GLUCOSE: 82 MG/DL (ref 74–99)
METER GLUCOSE: 87 MG/DL (ref 74–99)
METER GLUCOSE: 96 MG/DL (ref 74–99)
MONOCYTES ABSOLUTE: 1.36 E9/L (ref 0.1–0.95)
MONOCYTES RELATIVE PERCENT: 14.3 % (ref 2–12)
NEUTROPHILS ABSOLUTE: 5.42 E9/L (ref 1.8–7.3)
NEUTROPHILS RELATIVE PERCENT: 57.3 % (ref 43–80)
ORGANISM: ABNORMAL
ORGANISM: ABNORMAL
PDW BLD-RTO: 14.9 FL (ref 11.5–15)
PLATELET # BLD: 899 E9/L (ref 130–450)
PMV BLD AUTO: 8.7 FL (ref 7–12)
POTASSIUM SERPL-SCNC: 4 MMOL/L (ref 3.5–5)
RBC # BLD: 3.22 E12/L (ref 3.8–5.8)
SODIUM BLD-SCNC: 136 MMOL/L (ref 132–146)
WBC # BLD: 9.5 E9/L (ref 4.5–11.5)
WOUND/ABSCESS: ABNORMAL
WOUND/ABSCESS: ABNORMAL

## 2020-08-04 PROCEDURE — 2580000003 HC RX 258: Performed by: FAMILY MEDICINE

## 2020-08-04 PROCEDURE — 97530 THERAPEUTIC ACTIVITIES: CPT

## 2020-08-04 PROCEDURE — 99233 SBSQ HOSP IP/OBS HIGH 50: CPT | Performed by: INTERNAL MEDICINE

## 2020-08-04 PROCEDURE — 85613 RUSSELL VIPER VENOM DILUTED: CPT

## 2020-08-04 PROCEDURE — 2000000000 HC ICU R&B

## 2020-08-04 PROCEDURE — 6370000000 HC RX 637 (ALT 250 FOR IP): Performed by: INTERNAL MEDICINE

## 2020-08-04 PROCEDURE — 97162 PT EVAL MOD COMPLEX 30 MIN: CPT

## 2020-08-04 PROCEDURE — 6370000000 HC RX 637 (ALT 250 FOR IP): Performed by: FAMILY MEDICINE

## 2020-08-04 PROCEDURE — 6360000002 HC RX W HCPCS: Performed by: INTERNAL MEDICINE

## 2020-08-04 PROCEDURE — 6360000002 HC RX W HCPCS: Performed by: FAMILY MEDICINE

## 2020-08-04 PROCEDURE — 2580000003 HC RX 258: Performed by: INTERNAL MEDICINE

## 2020-08-04 PROCEDURE — 2700000000 HC OXYGEN THERAPY PER DAY

## 2020-08-04 PROCEDURE — 85025 COMPLETE CBC W/AUTO DIFF WBC: CPT

## 2020-08-04 PROCEDURE — 97166 OT EVAL MOD COMPLEX 45 MIN: CPT

## 2020-08-04 PROCEDURE — 85730 THROMBOPLASTIN TIME PARTIAL: CPT

## 2020-08-04 PROCEDURE — 82962 GLUCOSE BLOOD TEST: CPT

## 2020-08-04 PROCEDURE — 36415 COLL VENOUS BLD VENIPUNCTURE: CPT

## 2020-08-04 PROCEDURE — 80048 BASIC METABOLIC PNL TOTAL CA: CPT

## 2020-08-04 RX ORDER — INSULIN GLARGINE 100 [IU]/ML
15 INJECTION, SOLUTION SUBCUTANEOUS NIGHTLY
Status: DISCONTINUED | OUTPATIENT
Start: 2020-08-04 | End: 2020-08-05

## 2020-08-04 RX ADMIN — PIPERACILLIN AND TAZOBACTAM 3.38 G: 3; .375 INJECTION, POWDER, FOR SOLUTION INTRAVENOUS at 10:51

## 2020-08-04 RX ADMIN — PREGABALIN 300 MG: 100 CAPSULE ORAL at 20:34

## 2020-08-04 RX ADMIN — HEPARIN SODIUM 17 UNITS/KG/HR: 10000 INJECTION, SOLUTION INTRAVENOUS at 08:27

## 2020-08-04 RX ADMIN — POTASSIUM CHLORIDE 20 MEQ: 1500 TABLET, EXTENDED RELEASE ORAL at 08:19

## 2020-08-04 RX ADMIN — MORPHINE SULFATE 2 MG: 2 INJECTION, SOLUTION INTRAMUSCULAR; INTRAVENOUS at 14:07

## 2020-08-04 RX ADMIN — HEPARIN SODIUM 4800 UNITS: 1000 INJECTION INTRAVENOUS; SUBCUTANEOUS at 08:09

## 2020-08-04 RX ADMIN — PANTOPRAZOLE SODIUM 40 MG: 40 TABLET, DELAYED RELEASE ORAL at 05:54

## 2020-08-04 RX ADMIN — MORPHINE SULFATE 2 MG: 2 INJECTION, SOLUTION INTRAMUSCULAR; INTRAVENOUS at 03:50

## 2020-08-04 RX ADMIN — OXYCODONE AND ACETAMINOPHEN 1 TABLET: 5; 325 TABLET ORAL at 10:50

## 2020-08-04 RX ADMIN — OXYCODONE AND ACETAMINOPHEN 1 TABLET: 5; 325 TABLET ORAL at 18:13

## 2020-08-04 RX ADMIN — PRAVASTATIN SODIUM 20 MG: 20 TABLET ORAL at 20:34

## 2020-08-04 RX ADMIN — DULOXETINE HYDROCHLORIDE 30 MG: 30 CAPSULE, DELAYED RELEASE ORAL at 08:18

## 2020-08-04 RX ADMIN — PIPERACILLIN AND TAZOBACTAM 3.38 G: 3; .375 INJECTION, POWDER, FOR SOLUTION INTRAVENOUS at 03:28

## 2020-08-04 RX ADMIN — FENOFIBRATE 54 MG: 54 TABLET ORAL at 08:18

## 2020-08-04 RX ADMIN — PREGABALIN 300 MG: 100 CAPSULE ORAL at 08:18

## 2020-08-04 RX ADMIN — PIPERACILLIN AND TAZOBACTAM 3.38 G: 3; .375 INJECTION, POWDER, FOR SOLUTION INTRAVENOUS at 18:48

## 2020-08-04 RX ADMIN — MORPHINE SULFATE 2 MG: 2 INJECTION, SOLUTION INTRAMUSCULAR; INTRAVENOUS at 20:34

## 2020-08-04 RX ADMIN — SODIUM CHLORIDE 100 ML: 9 INJECTION, SOLUTION INTRAVENOUS at 15:00

## 2020-08-04 RX ADMIN — INSULIN LISPRO 2 UNITS: 100 INJECTION, SOLUTION INTRAVENOUS; SUBCUTANEOUS at 10:05

## 2020-08-04 RX ADMIN — APIXABAN 5 MG: 5 TABLET, FILM COATED ORAL at 21:30

## 2020-08-04 RX ADMIN — FLUTICASONE PROPIONATE 1 SPRAY: 50 SPRAY, METERED NASAL at 09:31

## 2020-08-04 RX ADMIN — INSULIN LISPRO 4 UNITS: 100 INJECTION, SOLUTION INTRAVENOUS; SUBCUTANEOUS at 01:27

## 2020-08-04 RX ADMIN — Medication 10 ML: at 20:34

## 2020-08-04 RX ADMIN — MORPHINE SULFATE 2 MG: 2 INJECTION, SOLUTION INTRAMUSCULAR; INTRAVENOUS at 08:24

## 2020-08-04 RX ADMIN — Medication 10 ML: at 08:21

## 2020-08-04 RX ADMIN — OXYCODONE AND ACETAMINOPHEN 1 TABLET: 5; 325 TABLET ORAL at 23:20

## 2020-08-04 RX ADMIN — OXYCODONE AND ACETAMINOPHEN 1 TABLET: 5; 325 TABLET ORAL at 03:57

## 2020-08-04 RX ADMIN — INSULIN GLARGINE 15 UNITS: 100 INJECTION, SOLUTION SUBCUTANEOUS at 20:37

## 2020-08-04 ASSESSMENT — PAIN DESCRIPTION - LOCATION
LOCATION: ABDOMEN
LOCATION: ABDOMEN;BACK;COCCYX
LOCATION: ABDOMEN
LOCATION: ABDOMEN
LOCATION: ABDOMEN;COCCYX;BACK
LOCATION: ABDOMEN

## 2020-08-04 ASSESSMENT — PAIN DESCRIPTION - PROGRESSION

## 2020-08-04 ASSESSMENT — PAIN DESCRIPTION - DESCRIPTORS
DESCRIPTORS: ACHING;DISCOMFORT
DESCRIPTORS: ACHING;DISCOMFORT;CONSTANT
DESCRIPTORS: DISCOMFORT;ACHING
DESCRIPTORS: ACHING;DISCOMFORT

## 2020-08-04 ASSESSMENT — PAIN SCALES - GENERAL
PAINLEVEL_OUTOF10: 8
PAINLEVEL_OUTOF10: 10
PAINLEVEL_OUTOF10: 7
PAINLEVEL_OUTOF10: 10
PAINLEVEL_OUTOF10: 8
PAINLEVEL_OUTOF10: 8
PAINLEVEL_OUTOF10: 10
PAINLEVEL_OUTOF10: 8
PAINLEVEL_OUTOF10: 0
PAINLEVEL_OUTOF10: 7

## 2020-08-04 ASSESSMENT — PAIN DESCRIPTION - ONSET
ONSET: ON-GOING

## 2020-08-04 ASSESSMENT — PAIN DESCRIPTION - PAIN TYPE
TYPE: CHRONIC PAIN

## 2020-08-04 ASSESSMENT — PAIN DESCRIPTION - ORIENTATION
ORIENTATION: MID

## 2020-08-04 ASSESSMENT — PAIN DESCRIPTION - FREQUENCY
FREQUENCY: CONTINUOUS

## 2020-08-04 NOTE — PROGRESS NOTES
Hospitalist Progress Note      PCP: Florence Duffy, APRN - CNP    Date of Admission: 7/31/2020    Chief Complaint: **Abd pain, and scaral decubiti      Hospital Course: ** apparently had been in a [facility] for rehab due to frankie in a COMA,  He has a sacral decubiti, it was thought to be infected. He has a wound vac, and a splenic abscess and PE with splenic v thrombosis. His platelets were extremely elevated. Seen by hematology, on full dose of lovenox. Last pm he became more hypotensive and tachycardic. He was transferred to MICU he went for surgery today, has a drain in RUQ draining clear yellow drainage, he had an IR cholecystostomy tube.        Subjective:   No acute events overnight    Medications:  Reviewed    Infusion Medications    heparin (porcine) 17 Units/kg/hr (08/04/20 0811)    dextrose      sodium chloride Stopped (08/02/20 0306)     Scheduled Medications    insulin lispro  0-12 Units Subcutaneous Q4H    sodium chloride flush  10 mL Intravenous 2 times per day    DULoxetine  30 mg Oral Daily    fenofibrate  54 mg Oral Daily    fluticasone  1 spray Each Nostril Daily    insulin glargine  20 Units Subcutaneous Nightly    [Held by provider] insulin lispro  5 Units Subcutaneous TID WC    [Held by provider] lisinopril  20 mg Oral Daily    pantoprazole  40 mg Oral QAM AC    pravastatin  20 mg Oral Nightly    pregabalin  300 mg Oral BID    [Held by provider] insulin lispro  0-10 Units Subcutaneous 4x Daily WC    piperacillin-tazobactam  3.375 g Intravenous Q8H    sodium chloride   Intravenous Q8H    potassium chloride  20 mEq Oral Daily     PRN Meds: iopamidol, heparin (porcine), heparin (porcine), sodium chloride flush, heparin flush, cyclobenzaprine, melatonin, glucose, dextrose, glucagon (rDNA), dextrose, acetaminophen **OR** acetaminophen, polyethylene glycol, promethazine **OR** ondansetron, morphine, oxyCODONE-acetaminophen      Intake/Output Summary (Last 24 hours) at 8/4/2020 infarction [D73.5] 08/01/2020    Cyst of spleen [D73.4] 08/01/2020    Splenic abscess [D73.3] 08/01/2020    Anemia [D64.9] 08/01/2020    Pulmonary embolism ([facility] [I26.99] 08/01/2020    Enterocolitis [K52.9] 07/31/2020    Cellulitis of sacral region [L03.319] 07/02/2019    Type 2 diabetes mellitus ([facility] [E11.9] 04/08/2016    Class 1 obesity in adult [E66.9] 12/17/2015    Essential hypertension [I10] 10/16/2015   R cholecystostomy tube. Plan:  1. Pulmonology following  2. Critical care following  3. General surgery following  4.  Infectious disease following  5. Hematology following  6. Medium dose correction insulin  7. Continue Zosyn  8. Continue heparin infusion  9.   Continue Cymbalta, fenofibrate, metoprolol, pravastatin, Lyrica      DVT Prophylaxis: *heparin  Diet: Diet NPO Effective Now Exceptions are: Sips with Meds  Code Status: Full Code     PT/OT Eval Status:  When stable           Electronically signed by Yahir Reilly DO on 8/4/2020 at [facility]

## 2020-08-04 NOTE — PROGRESS NOTES
noted.   HEENT: Round and reactive pupils. Moist mucous membranes. No ulcerations or thrush. Neck: Supple to movements. Chest: No respiratory distress. Good breath sounds. No crackles. Cardiovascular: Heart sounds rhythmic and regular. Abdomen: Positive bowel sounds to auscultation. Right upper quadrant cholecystostomy tube with bilious fluid. Slightly tender to palpation around the area. Coccygeal wound clean, measuring approximately 3 cm in diameter and undermining. No surrounding erythema. Extremities: Minimal edema. Lines: Left PICC (outside facility) placed around first week of July 2020.     Laboratory and Tests Review:  Lab Results   Component Value Date    WBC 9.5 08/04/2020    WBC 10.3 08/03/2020    WBC 8.0 08/02/2020    HGB 8.8 (L) 08/04/2020    HCT 28.7 (L) 08/04/2020    MCV 89.1 08/04/2020     (H) 08/04/2020     Lab Results   Component Value Date    NEUTROABS 5.42 08/04/2020    NEUTROABS 5.75 08/03/2020    NEUTROABS 5.78 08/02/2020     No results found for: [facility]  Lab Results   Component Value Date    ALT 5 08/02/2020    AST <5 08/02/2020    ALKPHOS 93 08/02/2020    BILITOT <0.2 08/02/2020     Lab Results   Component Value Date     08/04/2020    K 4.0 08/04/2020    K 3.7 08/02/2020    CL 97 08/04/2020    CO2 27 08/04/2020    BUN 7 08/04/2020    CREATININE 0.8 08/04/2020    CREATININE 0.9 08/02/2020    CREATININE 1.3 08/01/2020    GFRAA >60 08/04/2020    LABGLOM >60 08/04/2020    GLUCOSE 233 08/04/2020    GLUCOSE 125 05/11/2012    PROT 5.0 08/02/2020    LABALBU 2.5 08/02/2020    LABALBU 4.8 05/11/2012    CALCIUM 8.6 08/04/2020    BILITOT <0.2 08/02/2020    ALKPHOS 93 08/02/2020    AST <5 08/02/2020    ALT 5 08/02/2020     Lab Results   Component Value Date    CRP 3.9 (H) 08/01/2020    CRP 7.6 (H) 06/30/2019     Lab Results   Component Value Date    SEDRATE 28 (H) 06/30/2019     Radiology:  No new imaging studies    Microbiology:   Coccygeal wound 6/26/2020 [facility]

## 2020-08-04 NOTE — PLAN OF CARE
Problem: Pain:  Goal: Pain level will decrease  Description: Pain level will decrease  8/4/2020 1615 by Wilman De La Torre RN  Outcome: Met This Shift     Problem: Falls - Risk of:  Goal: Will remain free from falls  Description: Will remain free from falls  Outcome: Met This Shift     Problem: Falls - Risk of:  Goal: Absence of physical injury  Description: Absence of physical injury  Outcome: Met This Shift     Problem: Skin Integrity:  Goal: Will show no infection signs and symptoms  Description: Will show no infection signs and symptoms  Outcome: Met This Shift     Problem: Skin Integrity:  Goal: Absence of new skin breakdown  Description: Absence of new skin breakdown  Outcome: Met This Shift     Problem: Pain:  Goal: Control of acute pain  Description: Control of acute pain  8/4/2020 1615 by Wilman De La Torre RN  Outcome: Not Met This Shift     Problem: Pain:  Goal: Control of chronic pain  Description: Control of chronic pain  Outcome: Not Met This Shift

## 2020-08-04 NOTE — PLAN OF CARE
[facility] Quality Flow/Interdisciplinary Rounds Progress Note        Quality Flow Rounds held on August 4, 2020    Disciplines Attending:   Bedside Nurse, , Nursing Unit Leadership, Respiratory Therapy, Pharmacist, Physical Therapy, Occupational Therapy, Dr. Adriano Weiss and ICU team    Devorah Piper II was admitted on 7/31/2020  5:16 PM    Anticipated Discharge Date:  Expected Discharge Date: 08/03/20    Disposition: telemetry    Balbir Score:  Balbir Scale Score: 15    Readmission Risk              Risk of Unplanned Readmission:        13           Discussed patient goal for the day, patient clinical progression, and barriers to discharge.   The following Goal(s) of the Day/Commitment(s) have been identified: Check plan with surgery, transition to oral anticoagulation if able, transfer    Ivan Cotto  August 4, 2020

## 2020-08-04 NOTE — PROGRESS NOTES
-  Sensation:  Pt denies numbness and tingling to extremities  Edema:  Unremarkable     Vitals:  Blood Pressure at rest 107/64 Blood Pressure post session 117/85   Heart Rate at rest 106 bpm Heart Rate post session 106 bpm   SPO2 at rest 97% on 3 L  SPO2 post session 94% on 3 L        Functional Status Score-Intensive Care Unit (FSS-ICU)   Rolling 5/7   Supine to sit transfer 5/7   Unsupported sitting  5/7   Sit to stand transfers 5/7   Ambulation 1/7   Total  21/35     Therapeutic Exercises:     BLE ROM    Patient education  Pt educated on safety during functional mobility    Patient response to education:   Pt verbalized understanding Pt demonstrated skill Pt requires further education in this area   Yes  Yes  Reinforce      ASSESSMENT:    Comments:  Pt received side lying and agreeable to PT evaluation with OT collaboration. Pt cleared for participation by RN prior to session. Vitals monitored during session. Limited mostly d/t dizziness and pain of sacral wound. Performs bed mobility without physical assistance. Sat EOB, reported dizziness. BP assessed and WNL. HR and SPO2 WNL. Performed LE ROM at EOB. Performed STS and ambulation around bedside. Used [employer] for ambulation d/t continued dizziness and unsteadiness during gait. Demonstrates narrow MANPREET with near cross over of feet during gait. Sat at EOB on opposite side of room. Pt tachycardic during ambulation, HR increased to 140s. Pt given rest at bedside. Stood and ambulated back around room. Assisted back to supine and then in side lying again on exit. Pt left with call button in reach, lines attached, and needs met.       Treatment:  Patient practiced and was instructed in the following treatment:     Bed mobility training - pt given verbal and tactile cues to facilitate proper sequencing and safety during rolling and supine>sit as well as provided with physical assistance to complete task     Sitting EOB for >8 minutes for upright tolerance, postural awareness and BLE ROM   STS and pivot transfer training - pt educated on proper hand and foot placement, safety and sequencing, and use of WW to safely complete sit<>stand and pivot transfers with hands on assistance to complete task safely    Gait training- pt was given verbal and tactile cues to facilitate safety/balance, widen MANPREET, upright posture during ambulation as well as provided with physical assistance to complete task. Pt's/ family goals   1. Home with family     Patient and or family understand(s) diagnosis, prognosis, and plan of care. Yes     PLAN:    PT care will be provided in accordance with the objectives noted above. Exercises and functional mobility practice will be used as well as appropriate assistive devices or modalities to obtain goals. Patient and family education will also be administered as needed. Frequency of treatments: 2-5x/week x 1-2 weeks. Time in  1000  Time out  1040    Total Treatment Time  30 minutes     Evaluation Time includes thorough review of current medical information, gathering information on past medical history/social history and prior level of function, completion of standardized testing/informal observation of tasks, assessment of data and education on plan of care and goals.     CPT codes:  [] Low Complexity PT evaluation 58386  [x] Moderate Complexity PT evaluation 08660  [] High Complexity PT evaluation 00225  [] PT Re-evaluation 60267  [] Gait training 08514 - minutes  [] Manual therapy 86210 - minutes  [x] Therapeutic activities 33046 30 minutes  [] Therapeutic exercises 45429 - minutes  [] Neuromuscular reeducation 66042 - minutes     Justine Lundberg, PT, DPT  [id number]

## 2020-08-04 NOTE — PROGRESS NOTES
Contacted Levy Barrios DO from General Surgery via [facility] to find out if patient is able to be put on oral anticoagulants per resident's request. No response or new orders at this time.

## 2020-08-04 NOTE — PLAN OF CARE
Problem: Pain:  Goal: Pain level will decrease  Description: Pain level will decrease  8/4/2020 0606 by Chandan Barth, RN  Outcome: Met This Shift     Problem: Pain:  Goal: Control of acute pain  Description: Control of acute pain  8/4/2020 0606 by Chandan Barth, RN  Outcome: Met This Shift

## 2020-08-04 NOTE — PROGRESS NOTES
OCCUPATIONAL THERAPY INITIAL EVALUATION      Date:2020  Patient Name: Nonda Bence  MRN: [id number]  : 1975  Room: [address] [place], [place] [address]    Evaluating OT: VIVIAN Main/RAQUEL  Referring Provider: Daniela Dietrich MD    AM-PAC Daily Activity Raw Score: 15/24  Recommended Adaptive Equipment: to be determined     Comments: Based on patient's functional performance as stated below and level of assistance needed prior to admission, this therapist believes that the patient would benefit from further skilled OT following hospital stay in an effort to increase safety, functional independence, and quality of life. Diagnosis: Colitis [K52.9]   Surgery:  IR guided percutaneous cholecystostomy drain 8/3/20     Pertinent Medical History: SIMÓN, allergic rhinitis, chronic back pain, depression, DM, displacement of lumbar intervertebral disc, fibromyalgia, rib fx, head injury, HLD, HTN, obesity, OA, thoracic or lumbosacral radiculitis     Precautions:  Falls, sacral decubitus ulcer, R adriano drain, R hand/wrist splint as needed     Home Living: Pt is questionable historian- he believes he will be living with his children when discharged from facility however states that each child's living situation is uncertain at the moment. Prior Level of Function: Some assistance with ADLs and with IADLs; using SPC for ambulation.   Driving: yes  Occupation: NA    Pain Level: \"a little bit\" of buttocks pain  Cognition: A&O: 4/4; Follows 2-3 step directions   Memory: G   Sequencing: G   Problem solving: F   Judgement/safety: F   RASS: 0  CAM-ICU: negative     Functional Assessment:   Initial Eval Status  Date: 20 Treatment Status  Date: STG=LTG (~5-14  days)     Feeding Min A     improve self feeding task to set up A   Grooming Min A  standing    improve grooming/hygiene tasks to independent while standing at sink    UB Dressing Min A  To doff/mango gown at EOB    improve UB dressing to independent   LB Dressing Mod A    improve LB dressing to SBA with AE PRN   Bathing Mod A    improve UB/LB bathing to min A   Toileting Mod A    improve toileting task and all clothing mgmt to min A   Bed Mobility  Supine to sit: SBA  Sit to supine: SBA  improve bed mobility to independent in prep for EOB ADL tasks   Functional Transfers Sit to stand: SBA  Stand to sit: SBA  improve all functional transfers to independent   Functional Mobility Min A ww  For in-room distance x2, tachycardic in 140s with ambulation  improve functional mobility to mod I with AE PRN   Balance Sitting:     Static:  independent    Dynamic:SBA  Standing: SBA  demo independent dynamic sitting balance EOB during ADL tasks   Endurance/Activity Tolerance F  demo G- activity tolerance/endurance during 15-20 min ADL task    Visual/  Perceptual Glasses: none in room   -reports no vision in L eye in lower visual field           Hand dominance: R  UE ROM: RUE: WFL  LUE: WFL  Strength:   RUE: 3+/5 shoulder and elbow, 2-/5 wrist extension/flexion,  impaired with median nerve distribution stronger than ulnar N distribution, impaired thumb extension   LUE: grossly 4-/5   Strength: WFL  Fine Motor Coordination: WFL    Hearing: WFL  Sensation: neuropathy in B hands and feet  Tone: WNL  Edema: unremarkable    Vitals:   BP at rest: 107/64 BP at end of session: 117/85   HR at rest: 106 HR at end of session: 106   Spo2 at rest: 97% on 3L O2  Spo2 at end of session: 94% on 3L     Comments: Attended interdisciplinary rounds. OK from RN to see patient. Session completed with PT collaboration. Upon arrival patient supine with HOB slightly elevated. After session, patient sidelying with all devices within reach, all lines and tubes intact, nursing notified. Pt required cues and education as noted above for safe facilitation and completion of tasks. Therapist provided skilled monitoring of HR, O2 saturation, blood pressure and patient's response during treatment session.  Prior to and at the end of session, environmental modifications/line management completed for patients safety and efficiency of treatment session. Overall, patient demonstrates mild to moderate difficulties with completion of BADLs and IADLs. Factors contributing to these difficulties include impaired cognition, decreased endurance, and generalized weakness. As noted above, patient likely to benefit from further OT intervention to increase independence, safety, and overall quality of life. Treatment:  · Bed mobility: Facilitated bed mobility with cues for proper body mechanics and sequencing to prepare for ADL completion. · Functional transfers: Facilitated\ transfers from various surfaces with cues for body alignment, safety and hand placement. · ADL completion: Self-care retraining for the above-mentioned ADLs; training on proper hand placement, safety technique, sequencing, and energy conservation techniques. · Postural Balance: Standing balance retraining to improve righting reactions with postural changes during ADLs. · Cognitive/Perceptual training: retraining exercises to improve attention, mentation, and spatial awareness for ADLs & transfers. · Skilled positioning: Proper positioning to improve interaction with environment, overall functioning and decrease/prevent edema and contractures. · Delirium Prevention/Management: Implementation of non-pharmacologic interventions for delirium prevention incorporated throughout session to patient. Including environmental and sensory modifications to decrease patient's internal distraction caused by delirium, and improve overall mentation. Eval Complexity:   · Medium Complexity  · History: Expanded review of medical records and additional review of physical, cognitive, or psychosocial history related to current functional performance  · Exam: 3+ performance deficits  · Assistance/Modification: Min/mod assistance or modifications required to perform tasks.  May have comorbidities that affect occupational performance. Assessment of current deficits   Functional mobility [x]  ADLs [x] Strength [x]  Cognition [x]  Functional transfers  [x] IADLs [x] Safety Awareness [x]  Endurance [x]  Fine Motor Coordination [] Balance [x] Vision/perception [x] Sensation [x]   Gross Motor Coordination [] ROM [] Delirium []                  Motor Control [x]    Plan of Care: 1-5 tx/wk PRN   ADL retraining [x]   Equipment needs [x]   Neuromuscular re-education [] Energy Conservation Techniques [x]  Functional Transfer training [x] Patient and/or Family Education [x]  Functional Mobility training [x]  Environmental Modifications [x]  Cognitive re-training []   Compensatory techniques for ADLs [x]  Splinting Needs []   Positioning to improve overall function [x]   Therapeutic Activity [x]                       Therapeutic Exercise  [x]  Visual/Perceptual: []    Delirium prevention/treatment  [x]   Other:  []    Rehab Potential: Good for established goals    Patient / Family Goal: None stated     Patient and/or family were instructed/educated on diagnosis, prognosis/goals and plan of care. Demonstrated fair understanding, further information may be needed. [] Malnutrition indicators have been identified and nursing has been notified to ensure a dietitian consult is ordered.         (Evaluation time includes thorough review of current medical information, gathering information on past medical history/social history and prior level of function, completion of standardized testing/informal observation of tasks, assessment of data and development of POC/Goals.)    Medium Complexity Evaluation +  Treatment Time In: 1010            Treatment Time Out: 1040               Treatment Charges: Mins Units   Ther Ex  94513     Manual Therapy [id number]     Thera Activities 24177 30 2   ADL/Home Mgt 48545     Neuro Re-ed 38129     Group Therapy      Orthotic manage/training  88460     Non-Billable Time     Total Timed Treatment [facility], OTR/L  [id number]

## 2020-08-04 NOTE — PROGRESS NOTES
08/03/2020     CMP:  Lab Results   Component Value Date     08/02/2020    K 3.7 08/02/2020     08/02/2020    CO2 24 08/02/2020    BUN 8 08/02/2020    PROT 5.0 08/02/2020         Assessment and Plan       Sepsis/shock, Likely 2/2 acute cholecystis     On IV abx   For IR gallbladder drainage   wean pressors       Hx of PE 6/2020  - on therapeutic lovenox--> held for possible procedure     Recent splenic infarct   - continue to monitor    Sacral decubitus ulcer, stage III, does not look infected, with good granulation tissue   - packed in MICU and dressing applied    - Continue to keep clean, dry, intact  - wound care consult       Joshua Bentley MD,FCCP  Pulmonary&Critical Care Medicine   Director of [address] [place] [address] Director of [address] [place] [address]    Kennedy Franco

## 2020-08-04 NOTE — PROGRESS NOTES
[address] [facility]  Department of Internal Medicine   Internal Medicine Residency   MICU Progress Note    Patient:  Mark Gaona 53 y.o. male  MRN: [id number]     Date of Service: 8/4/2020    Allergy: Seasonal and Tramadol    Subjective     Patient examined in the AM and alert, responsive to commands. Says abd. Pain has reduced     24h change: No acute events overnight     Objective     VS: /79   Pulse 114   Temp 97.5 °F (36.4 °C) (Bladder)   Resp 12   Ht 6' 1\" (1.854 m)   Wt 257 lb 15 oz (117 kg)   SpO2 96%   BMI 34.03 kg/m²           I & O - 24hr:     Intake/Output Summary (Last 24 hours) at 8/4/2020 1917  Last data filed at 8/4/2020 1800  Gross per 24 hour   Intake 1344.82 ml   Output 2670 ml   Net -1325.18 ml       Physical Exam:  · General Appearance: alert, appears stated age and cooperative  · Neck: no adenopathy, no carotid bruit, no JVD, supple, symmetrical, trachea midline and thyroid not enlarged, symmetric, no tenderness/mass/nodules  · Lung: clear to auscultation bilaterally  · Heart: regular rate and rhythm, S1, S2 normal, no murmur, click, rub or gallop  · Abdomen: soft, non-tender; bowel sounds normal; no masses,  no organomegaly and cholecystostomy in place  · Extremities:  extremities normal, atraumatic, no cyanosis or edema  · Musculoskeletal: No joint swelling, no muscle tenderness. ROM normal in all joints of extremities.    · Neurologic: Mental status: Alert, oriented, thought content appropriate    Lines     site day    Art line   None    TLC None    PICC L Arm 3   Hemoaccess rt peripheral iv      ABG:   No results found for: PHART, PH, CSM1ZGH, PCO2, PO2ART, PO2, FNM7PBB, HCO3, BEART, BE, THGBART, THB, ULW4QAU, P5SEOPDG, O2SAT     Medications       Nutrition:   General Diet    ATB:   Antibiotics  Days   Zosyn 4               Labs     CBC:   Lab Results   Component Value Date    WBC 9.5 08/04/2020    RBC 3.22 08/04/2020    HGB 8.8 08/04/2020    HCT 28.7 08/04/2020 MCV 89.1 08/04/2020    MCH 27.3 08/04/2020    MCHC 30.7 08/04/2020    RDW 14.9 08/04/2020     08/04/2020    MPV 8.7 08/04/2020     CMP:    Lab Results   Component Value Date     08/04/2020    K 4.0 08/04/2020    K 3.7 08/02/2020    CL 97 08/04/2020    CO2 27 08/04/2020    BUN 7 08/04/2020    CREATININE 0.8 08/04/2020    GFRAA >60 08/04/2020    LABGLOM >60 08/04/2020    GLUCOSE 233 08/04/2020    GLUCOSE 125 05/11/2012    PROT 5.0 08/02/2020    LABALBU 2.5 08/02/2020    LABALBU 4.8 05/11/2012    CALCIUM 8.6 08/04/2020    BILITOT <0.2 08/02/2020    ALKPHOS 93 08/02/2020    AST <5 08/02/2020    ALT 5 08/02/2020     Skin issues: Coccygeal Wound     Imaging Studies:  CXR:   Left-sided PICC line, as described. No acute cardiopulmonary disease process is identified. Resident's Assessment and Plan     Te Garcia   , 53 y.o. , male came with CC : abd pain, diarrhea x 2 weeks      has a past medical history of Accident, SIMÓN (acute kidney injury) ([address] Allergic rhinitis, Chronic back pain, Depression, Diabetes mellitus ([address] Difficulty sleeping, Displacement of lumbar intervertebral disc without myelopathy, Fibromyalgia, Fractured rib, H/O seasonal allergies, Head injury, Hyperlipidemia, Hypertension, Obesity (BMI 35.0-39.9 without comorbidity), Osteoarthritis, and Thoracic or lumbosacral neuritis or radiculitis, unspecified. Currently being managed for :    Neurology:   Rt hand wrist drop/ weakness  - CT shows no acute intracranial abnormality  - F/u with ortho/neuro when on floors    Cardiology:   Tachycardia   - Junctional tachycardia vs SVT. Improved, sinus tachy now. Likely 2/2 sepsis.  Echo 8/3 showing Mild LVH, LV septal motion disorder consistent with conduction disorder, EF 60-65%              - Cardiology following  HLD   - on fenofibrate    HTN  - Lisinopril held due to hypotension  Pulmonary:   Hx of PE 6/2020   - On therapeutic Lovenox at home, limited past workup documentation ( patient and that the key elements of the encounter were performed by me (> 85 % time). The medications & laboratory data was discussed and adjusted where necessary. The radiographic images were reviewed or with radiologist or consultant if felt dis-concordant with the exam or history. The above findings were corroborated, plans confirmed and changes made if needed. Family is updated at the bedside as available. Key issues of the case were discussed among consultants. Critical Care time is documented if appropriate.       Cookie Peabody, DO, FACP, FCCP, Danette verduzco,

## 2020-08-04 NOTE — PROGRESS NOTES
0-12 Units, Subcutaneous, TID WC, Darya Gonzalez MD, 2 Units at 08/04/20 1005    iopamidol (ISOVUE-300) 61 % injection 20 mL, 20 mL, Other, ONCE PRN, Duane Vaca MD    heparin (porcine) injection 9,600 Units, 80 Units/kg, Intravenous, PRN, Hamida Paul MD    heparin (porcine) injection 4,800 Units, 40 Units/kg, Intravenous, PRN, Hamida Paul MD, 4,800 Units at 08/04/20 0809    heparin 25,000 units in dextrose 5% 250 mL infusion, 18 Units/kg/hr, Intravenous, Continuous, Hamida Paul MD, Last Rate: 20.4 mL/hr at 08/04/20 0827, 17 Units/kg/hr at 08/04/20 0827    sodium chloride flush 0.9 % injection 10 mL, 10 mL, Intravenous, 2 times per day, Megha Bailey MD, 10 mL at 08/04/20 0821    sodium chloride flush 0.9 % injection 10 mL, 10 mL, Intravenous, PRN, Megha Bailey MD, 10 mL at 08/03/20 0529    heparin flush 100 UNIT/ML injection 100 Units, 100 Units, Intracatheter, PRN, Eugenio Rodriguez MD    cyclobenzaprine (FLEXERIL) tablet 5 mg, 5 mg, Oral, BID PRN, Anju Hurley MD, 5 mg at 08/02/20 0857    DULoxetine (CYMBALTA) extended release capsule 30 mg, 30 mg, Oral, Daily, Anju Hurley MD, 30 mg at 08/04/20 0818    fenofibrate (TRICOR) tablet 54 mg, 54 mg, Oral, Daily, Anju Hurley MD, 54 mg at 08/04/20 0818    fluticasone (FLONASE) 50 MCG/ACT nasal spray 1 spray, 1 spray, Each Nostril, Daily, Anju Hurley MD, 1 spray at 08/04/20 0931    insulin glargine (LANTUS) injection vial 20 Units, 20 Units, Subcutaneous, Nightly, Tyson Alvares MD, 20 Units at 08/03/20 2105    [Held by provider] insulin lispro (HUMALOG) injection vial 5 Units, 5 Units, Subcutaneous, TID WC, Anju Hurley MD, Stopped at 08/02/20 0859    [Held by provider] lisinopril (PRINIVIL;ZESTRIL) tablet 20 mg, 20 mg, Oral, Daily, Anju Hurley MD, 20 mg at 08/01/20 0848    melatonin tablet 3 mg, 3 mg, Oral, Nightly PRN, Anju Hurley MD    pantoprazole (PROTONIX) tablet 40 mg, 40 mg, Oral, QAM AC, Anju Hurley MD, 40 mg at 08/04/20 0554    pravastatin (PRAVACHOL) tablet 20 mg, 20 mg, Oral, Nightly, Ashley Mcintosh MD, 20 mg at 08/03/20 2105    pregabalin (LYRICA) capsule 300 mg, 300 mg, Oral, BID, Ashley Mcintosh MD, 300 mg at 08/04/20 0818    glucose (GLUTOSE) 40 % oral gel 15 g, 15 g, Oral, PRN, Ashley Mcintosh MD    dextrose 50 % IV solution, 12.5 g, Intravenous, PRN, Ashley Mcintosh MD    glucagon (rDNA) injection 1 mg, 1 mg, Intramuscular, PRN, Ashley Mcintosh MD    dextrose 5 % solution, 100 mL/hr, Intravenous, PRN, Ashley Mcintosh MD    acetaminophen (TYLENOL) tablet 650 mg, 650 mg, Oral, Q6H PRN, 650 mg at 08/01/20 1954 **OR** acetaminophen (TYLENOL) suppository 650 mg, 650 mg, Rectal, Q6H PRN, Ashley Mcintosh MD    polyethylene glycol (GLYCOLAX) packet 17 g, 17 g, Oral, Daily PRN, Ashley Mcintosh MD    promethazine (PHENERGAN) tablet 12.5 mg, 12.5 mg, Oral, Q6H PRN **OR** ondansetron (ZOFRAN) injection 4 mg, 4 mg, Intravenous, Q6H PRN, Ashley Mcintosh MD    piperacillin-tazobactam (ZOSYN) 3.375 g in dextrose 5 % 100 mL IVPB extended infusion (mini-bag), 3.375 g, Intravenous, Q8H, Tyson Alvares MD, Last Rate: 25 mL/hr at 08/04/20 1051, 3.375 g at 08/04/20 1051    0.9 % sodium chloride infusion admixture, , Intravenous, Q8H, Ashley Mcintosh MD, Stopped at 08/03/20 0751    potassium chloride (KLOR-CON M) extended release tablet 20 mEq, 20 mEq, Oral, Daily, Felisa Infante DO, 20 mEq at 08/04/20 9653    morphine (PF) injection 2 mg, 2 mg, Intravenous, Q4H PRN, Ashley Mcintosh MD, 2 mg at 08/04/20 0824    oxyCODONE-acetaminophen (PERCOCET) 5-325 MG per tablet 1 tablet, 1 tablet, Oral, Q6H PRN, Ashley Mcintosh MD, 1 tablet at 08/04/20 1050    Assessment:    Active Problems:    Essential hypertension    Class 1 obesity in adult    Type 2 diabetes mellitus (HCC)    Cellulitis of sacral region    Enterocolitis    Decubitus ulcer of sacral area    Abnormal findings on diagnostic imaging of gall bladder    Splenic infarction    Cyst of spleen    Splenic abscess    Anemia    Pulmonary embolism ([address]  Resolved Problems:    * No resolved hospital problems. *    51 year old gentleman with a normal cbc May 2020 platelets were 572,914 hgb 14 and wbc was 10 at that time. He reportedly was admitted to [facility], since that time with PE and splenic infarct followed by abscess/liquificatin of spleen. He reportedly was discharged from [facility] to 3 weeks ago on lovenox and never has failed oral anticoagulation. Upon admission he was found to have thrombocytosis with platelet count 0,147,440 on 7-31-20 and CT abd/pelvis showing large collection fluid in splenic bed ?cystic vs abscess, possible cholecystitis, distended small bowel and sacral dec ulceration/infection. He was started on zosyn and thrombocytosis is improving with today platelet RSEFA [phone]. HIDA scan showed cholecystitis.  Thrombocytosis due to infection and functional loss of spleen improving with antibiotics. He is s/p CT-guided cholecystostomy tube placement today 8/3/2020.     Plan:  -CBC daily, thrombocytosis likely will persist due to asplenia, but should be at reduced level after improves from acute issues. -Was switched to heparin gtt for cholecystostomy,   - Okay for oral anticoagulation after cleared to start by surgery. -Management of cholecystitis per surgery, for cholecystostomy today   -Continue IV ABX with Zosyn per I.D, being treated for acute cholecystitis. -Decubitus ulcer not thought to be infected  -Will continue to follow.        Electronically signed by IGOR Reardon NP on 8/4/2020 at 10:51 AM

## 2020-08-04 NOTE — CARE COORDINATION
Patient remains in MICU on 3L nc and heparin gtt. S/p choli tube placement 8/3. Patient is on zosyn IVPB Q 8 hr. Per Public Benefits patient's [employer] plan termed; patient is Medicaid pending. Due to this insurance update I talked to patient about LTAC at [facility] because they will accept pending Medicaid once number is obtained; patient is agreeable to LTAC at [facility] [place] [place]. Simone Guthrie will accept once pending Medicaid number is maintained. Backup plan is return to [facility] at [address]- per Tralene they accept pending Medicaid. CM/SW will continue to follow for discharge planning.

## 2020-08-05 LAB
BASOPHILS ABSOLUTE: 0.06 E9/L (ref 0–0.2)
BASOPHILS RELATIVE PERCENT: 0.8 % (ref 0–2)
BLOOD CULTURE, ROUTINE: NORMAL
BODY FLUID CULTURE, STERILE: NORMAL
CULTURE, BLOOD 2: NORMAL
EOSINOPHILS ABSOLUTE: 0.46 E9/L (ref 0.05–0.5)
EOSINOPHILS RELATIVE PERCENT: 6 % (ref 0–6)
GRAM STAIN RESULT: NORMAL
HCT VFR BLD CALC: 29.6 % (ref 37–54)
HEMOGLOBIN: 8.9 G/DL (ref 12.5–16.5)
IMMATURE GRANULOCYTES #: 0.02 E9/L
IMMATURE GRANULOCYTES %: 0.3 % (ref 0–5)
LYMPHOCYTES ABSOLUTE: 2.3 E9/L (ref 1.5–4)
LYMPHOCYTES RELATIVE PERCENT: 29.8 % (ref 20–42)
MCH RBC QN AUTO: 26.8 PG (ref 26–35)
MCHC RBC AUTO-ENTMCNC: 30.1 % (ref 32–34.5)
MCV RBC AUTO: 89.2 FL (ref 80–99.9)
METER GLUCOSE: 122 MG/DL (ref 74–99)
METER GLUCOSE: 185 MG/DL (ref 74–99)
METER GLUCOSE: 79 MG/DL (ref 74–99)
MONOCYTES ABSOLUTE: 1.33 E9/L (ref 0.1–0.95)
MONOCYTES RELATIVE PERCENT: 17.2 % (ref 2–12)
NEUTROPHILS ABSOLUTE: 3.55 E9/L (ref 1.8–7.3)
NEUTROPHILS RELATIVE PERCENT: 45.9 % (ref 43–80)
PDW BLD-RTO: 14.8 FL (ref 11.5–15)
PLATELET # BLD: 831 E9/L (ref 130–450)
PMV BLD AUTO: 8.4 FL (ref 7–12)
RBC # BLD: 3.32 E12/L (ref 3.8–5.8)
WBC # BLD: 7.7 E9/L (ref 4.5–11.5)

## 2020-08-05 PROCEDURE — 2580000003 HC RX 258: Performed by: INTERNAL MEDICINE

## 2020-08-05 PROCEDURE — 2700000000 HC OXYGEN THERAPY PER DAY

## 2020-08-05 PROCEDURE — 99233 SBSQ HOSP IP/OBS HIGH 50: CPT | Performed by: INTERNAL MEDICINE

## 2020-08-05 PROCEDURE — 36415 COLL VENOUS BLD VENIPUNCTURE: CPT

## 2020-08-05 PROCEDURE — 97530 THERAPEUTIC ACTIVITIES: CPT

## 2020-08-05 PROCEDURE — 6370000000 HC RX 637 (ALT 250 FOR IP): Performed by: FAMILY MEDICINE

## 2020-08-05 PROCEDURE — 6370000000 HC RX 637 (ALT 250 FOR IP): Performed by: INTERNAL MEDICINE

## 2020-08-05 PROCEDURE — 97535 SELF CARE MNGMENT TRAINING: CPT

## 2020-08-05 PROCEDURE — 6360000002 HC RX W HCPCS: Performed by: INTERNAL MEDICINE

## 2020-08-05 PROCEDURE — 6360000002 HC RX W HCPCS: Performed by: FAMILY MEDICINE

## 2020-08-05 PROCEDURE — 82962 GLUCOSE BLOOD TEST: CPT

## 2020-08-05 PROCEDURE — 85025 COMPLETE CBC W/AUTO DIFF WBC: CPT

## 2020-08-05 PROCEDURE — 2580000003 HC RX 258: Performed by: FAMILY MEDICINE

## 2020-08-05 PROCEDURE — 2060000000 HC ICU INTERMEDIATE R&B

## 2020-08-05 PROCEDURE — 99232 SBSQ HOSP IP/OBS MODERATE 35: CPT | Performed by: SURGERY

## 2020-08-05 RX ORDER — INSULIN GLARGINE 100 [IU]/ML
10 INJECTION, SOLUTION SUBCUTANEOUS NIGHTLY
Status: DISCONTINUED | OUTPATIENT
Start: 2020-08-05 | End: 2020-08-11 | Stop reason: HOSPADM

## 2020-08-05 RX ADMIN — MORPHINE SULFATE 2 MG: 2 INJECTION, SOLUTION INTRAMUSCULAR; INTRAVENOUS at 12:30

## 2020-08-05 RX ADMIN — FLUTICASONE PROPIONATE 1 SPRAY: 50 SPRAY, METERED NASAL at 09:59

## 2020-08-05 RX ADMIN — Medication 10 ML: at 09:57

## 2020-08-05 RX ADMIN — PANTOPRAZOLE SODIUM 40 MG: 40 TABLET, DELAYED RELEASE ORAL at 06:15

## 2020-08-05 RX ADMIN — APIXABAN 5 MG: 5 TABLET, FILM COATED ORAL at 09:58

## 2020-08-05 RX ADMIN — POTASSIUM CHLORIDE 20 MEQ: 1500 TABLET, EXTENDED RELEASE ORAL at 10:00

## 2020-08-05 RX ADMIN — PREGABALIN 300 MG: 100 CAPSULE ORAL at 09:58

## 2020-08-05 RX ADMIN — DULOXETINE HYDROCHLORIDE 30 MG: 30 CAPSULE, DELAYED RELEASE ORAL at 09:57

## 2020-08-05 RX ADMIN — APIXABAN 5 MG: 5 TABLET, FILM COATED ORAL at 20:30

## 2020-08-05 RX ADMIN — PREGABALIN 300 MG: 100 CAPSULE ORAL at 20:30

## 2020-08-05 RX ADMIN — OXYCODONE AND ACETAMINOPHEN 1 TABLET: 5; 325 TABLET ORAL at 23:56

## 2020-08-05 RX ADMIN — FENOFIBRATE 54 MG: 54 TABLET ORAL at 09:58

## 2020-08-05 RX ADMIN — ONDANSETRON 4 MG: 2 INJECTION INTRAMUSCULAR; INTRAVENOUS at 21:13

## 2020-08-05 RX ADMIN — Medication 10 ML: at 20:30

## 2020-08-05 RX ADMIN — INSULIN LISPRO 2 UNITS: 100 INJECTION, SOLUTION INTRAVENOUS; SUBCUTANEOUS at 12:20

## 2020-08-05 RX ADMIN — OXYCODONE AND ACETAMINOPHEN 1 TABLET: 5; 325 TABLET ORAL at 16:47

## 2020-08-05 RX ADMIN — SODIUM CHLORIDE, PRESERVATIVE FREE 10 ML: 5 INJECTION INTRAVENOUS at 21:13

## 2020-08-05 RX ADMIN — MORPHINE SULFATE 2 MG: 2 INJECTION, SOLUTION INTRAMUSCULAR; INTRAVENOUS at 17:34

## 2020-08-05 RX ADMIN — PIPERACILLIN AND TAZOBACTAM 3.38 G: 3; .375 INJECTION, POWDER, FOR SOLUTION INTRAVENOUS at 03:45

## 2020-08-05 RX ADMIN — PRAVASTATIN SODIUM 20 MG: 20 TABLET ORAL at 19:08

## 2020-08-05 RX ADMIN — MORPHINE SULFATE 2 MG: 2 INJECTION, SOLUTION INTRAMUSCULAR; INTRAVENOUS at 04:01

## 2020-08-05 RX ADMIN — OXYCODONE AND ACETAMINOPHEN 1 TABLET: 5; 325 TABLET ORAL at 09:58

## 2020-08-05 RX ADMIN — INSULIN GLARGINE 10 UNITS: 100 INJECTION, SOLUTION SUBCUTANEOUS at 20:30

## 2020-08-05 RX ADMIN — MORPHINE SULFATE 2 MG: 2 INJECTION, SOLUTION INTRAMUSCULAR; INTRAVENOUS at 22:23

## 2020-08-05 ASSESSMENT — PAIN DESCRIPTION - PAIN TYPE: TYPE: CHRONIC PAIN

## 2020-08-05 ASSESSMENT — PAIN DESCRIPTION - PROGRESSION
CLINICAL_PROGRESSION: NOT CHANGED

## 2020-08-05 ASSESSMENT — PAIN SCALES - GENERAL
PAINLEVEL_OUTOF10: 8
PAINLEVEL_OUTOF10: 6
PAINLEVEL_OUTOF10: 10
PAINLEVEL_OUTOF10: 8
PAINLEVEL_OUTOF10: 6
PAINLEVEL_OUTOF10: 6
PAINLEVEL_OUTOF10: 8
PAINLEVEL_OUTOF10: 10
PAINLEVEL_OUTOF10: 9
PAINLEVEL_OUTOF10: 8
PAINLEVEL_OUTOF10: 8
PAINLEVEL_OUTOF10: 10
PAINLEVEL_OUTOF10: 8

## 2020-08-05 ASSESSMENT — PAIN DESCRIPTION - DESCRIPTORS: DESCRIPTORS: ACHING;DISCOMFORT

## 2020-08-05 ASSESSMENT — PAIN DESCRIPTION - LOCATION: LOCATION: ABDOMEN;BACK;COCCYX

## 2020-08-05 NOTE — PLAN OF CARE
Problem: Pain:  Goal: Pain level will decrease  Description: Pain level will decrease  8/5/2020 0612 by Malgorzata Colon RN  Outcome: Met This Shift     Problem: Falls - Risk of:  Goal: Will remain free from falls  Description: Will remain free from falls  8/5/2020 0612 by Malgorzata Colon RN  Outcome: Met This Shift

## 2020-08-05 NOTE — CARE COORDINATION
Patient remains in MICU on 3L nc; heparin gtt now off. Per resident they will be transferring patient out of ICU today. S/P choli tube placement 8/3. Zosyn stopped today. Renee Funes was following and would accept patient pending they can obtain pending Medicaid number; however I believe patient has lost LTAC criteria at this point. Patient is agreeable to return to [facility] at [address]- per Tralene they accept pending Medicaid. Envelope & ambulance form on soft chart. CM/SW will continue to follow for discharge planning.

## 2020-08-05 NOTE — PROGRESS NOTES
Occupational Therapy  OT BEDSIDE TREATMENT NOTE      Date:2020  Patient Name: Laurel Ng  MRN: [id number]  : 1975  Room: 12-A       Per OT Eval:    Evaluating OT: VIVIAN Mccall/RAQUEL  Referring Provider: Lorri Fisher MD     AM-PAC Daily Activity Raw Score: 15/24  Recommended Adaptive Equipment: to be determined      Comments: Based on patient's functional performance as stated below and level of assistance needed prior to admission, this therapist believes that the patient would benefit from further skilled OT following hospital stay in an effort to increase safety, functional independence, and quality of life.     Diagnosis: Colitis [K52.9]   Surgery:  IR guided percutaneous cholecystostomy drain 8/3/20      Pertinent Medical History: SIMÓN, allergic rhinitis, chronic back pain, depression, DM, displacement of lumbar intervertebral disc, fibromyalgia, rib fx, head injury, HLD, HTN, obesity, OA, thoracic or lumbosacral radiculitis      Precautions:  Falls, sacral decubitus ulcer, R adriano drain, R hand/wrist splint as needed     Home Living: Pt is questionable historian- he believes he will be living with his children when discharged from facility however states that each child's living situation is uncertain at the moment.     Prior Level of Function: Some assistance with ADLs and with IADLs; using SPC for ambulation. Driving: yes  Occupation: NA     Pain Level: minimal buttocks pain noted this session  Cognition: A&O: 4/4; Follows 2-3 step directions              Memory: G              Sequencing: G              Problem solving: F              Judgement/safety: F              RASS: 0  CAM-ICU: negative                Functional Assessment:    Initial Eval Status  Date: 20 Treatment Status  Date: 20 STG=LTG (~5-14  days)      Feeding Min A  Set-up     improve self feeding task to set up A   Grooming Min A  standing  Min.  A (standing at sink to complete oral care)     improve grooming/hygiene tasks to independent while standing at sink    UB Dressing Min A  To doff/mango gown at [address] A    improve UB dressing to independent   LB Dressing Mod A NT (Mod. A per previous session)     improve LB dressing to SBA with AE PRN   Bathing Mod A  Mod. A (simulated)    improve UB/LB bathing to min A   Toileting Mod A NT       improve toileting task and all clothing mgmt to min A   Bed Mobility  Supine to sit: SBA  Sit to supine: SBA Supine to sit: seated EOB upon room entry  Sit to supine: SBA  improve bed mobility to independent in prep for EOB ADL tasks   Functional Transfers Sit to stand: SBA  Stand to sit: SBA  Sit<>stand: SBA improve all functional transfers to independent   Functional Mobility Min A ww  For in-room distance x2, tachycardic in 140s with ambulation  Min. A w/ SPC  In room --to/from bathroom (pt reports nausea after oral care in bathroom and requesting to sit down.  Assisted pt safely back to EOB)  improve functional mobility to mod I with AE PRN   Balance Sitting:     Static:  independent    Dynamic:SBA  Standing: SBA  Sitting:   Static: Independent  Dynamic: SBA  Standing: SBA w/ SPC demo independent dynamic sitting balance EOB during ADL tasks   Endurance/Activity Tolerance F  F demo G- activity tolerance/endurance during 15-20 min ADL task    Visual/  Perceptual Glasses: none in room   -reports no vision in L eye in lower visual field                 Hand dominance: R  UE ROM:        RUE: WFL                   LUE: WFL  Strength:              RUE: 3+/5 shoulder and elbow, 2-/5 wrist extension/flexion,  impaired with median nerve distribution stronger than ulnar N distribution, impaired thumb extension              LUE: grossly 4-/5   Strength: WFL  Fine Motor Coordination: WFL     Hearing: WFL  Sensation: neuropathy in B hands and feet  Tone: WNL  Edema: unremarkable     Vitals:   BP at rest: 169/57 BP at end of session: ---   HR at rest: 130 HR at end of session: 122   Spo2 at rest: 95% on 3L O2  Spo2 at end of session: 98% on 3L        Treatment: OT services provided include: Instruction on precautions prior to bed mobility to facilitate safe transfers and ADLS; dynamic sitting/standing balance retraining ex's to improve righting reactions with postural changes during ADLS; adapted techniques/AE to increase independence and safe reach during dressing/bathing activities; functional transfer training including postural cues, hand placement/sequencing to assist with balance and fall prevention; breathing techniques, pacing, work simplification strategies reviewed for safety and energy conservation during self care tasks and activities of daily living. Comments: OK from RN to see patient and discussed pt case during quality flow rounds. Upon arrival, patient seated EOB. Pt demo fair tolerance with fair understanding of education/techniques. At end of session, patient lying in bed. Call light within reach, all lines and tubes intact. Pt instructed on use of call light for assistance and fall prevention. Line management and environmental modifications made prior to and end of session to ensure patient safety and to increase efficiency of session. Skilled monitoring of HR, O2 saturation, blood pressure and patient's response to activity performed throughout session. Overall, pt presents with decreased activity tolerance, dynamic balance, functional mobility limiting completion of ADLs and safe return home. Pt can benefit from continued skilled OT to increase safety and functional independence. · Pt has made fair- progress towards set goals.    · Continue with current plan of care    Treatment Time In: 1:20             Treatment Time Out: 1:45               Treatment Charges: Mins Units   Ther Ex  [id number]     Manual Therapy [id number]     Thera Activities 84161 15 1   ADL/Home Mgt 80831 10 1   Neuro Re-ed 308 [facility] manage/training  03574 Non-Billable Time     Total Timed Treatment 25 2639  Jovita Boyce, OTR/L, #[id number]

## 2020-08-05 NOTE — PROGRESS NOTES
[facility]  ATTENDING PHYSICIAN PROGRESS NOTE     I have examined the patient and  reviewed the record. I have reviewed all relevant labs and imaging data. The following summarizes my clinical findings and independent assessment. CC: Cholecystitis    S. He underwent IR drainage 2 days ago. He has been tolerating diet. He has minimal abdominal pain. O.  Vitals:    08/05/20 1200   BP: (!) 91/51   Pulse: 111   Resp: 13   Temp: 98.5 °F (36.9 °C)   SpO2: 95%     PHYSICAL EXAM   PSYCH: mood and affect normal, alert and oriented x 3  CONSTITUTIONAL: No apparent distress, comfortable  EYES: Sclera white, pupils equal round and reactive to light  ENMT:  Hearing normal, trachea midline, ears externally intact  RESP: Breath sounds were clear and equal with no rales, wheezes, or rhonchi. Respiratory effort was normal with no retractions or use of accessory muscles. CV: Heart sounds were normal with a regular rate and rhythm. No pedal edema  GI/ Abdomen: The abdomen was soft and non distended. There was no tenderness, guarding, rebound, or rigidity. There was no                     masses, hepatosplenomegaly, or hernias. Upper quadrant drain present-bilious      ASSESSMENT:  Active Problems:    Essential hypertension    Class 1 obesity in adult    Type 2 diabetes mellitus (HCC)    Cellulitis of sacral region    Enterocolitis    Decubitus ulcer of sacral area    Abnormal findings on diagnostic imaging of gall bladder    Splenic infarction    Cyst of spleen    Splenic abscess    Anemia    Pulmonary embolism (HCC)  Resolved Problems:    * No resolved hospital problems. *       PLAN:  Clinic fluid collection-does not appear infected. Continue to monitor  Sacral cubitus ulcer--local skin care  Acute cholecystitis--continue IR cholecystostomy tube  Diet as tolerated  History of pulmonary embolus-continue anticoagulation    No acute surgical intervention.   We will sign off. Call   if any questions. Neftali Steele MD, FACS  8/5/2020  12:07 PM    NOTE: This report was transcribed using voice recognition software. Every effort was made to ensure accuracy; however, inadvertent computerized transcription errors may be present.

## 2020-08-05 NOTE — PROGRESS NOTES
Hospitalist Progress Note      PCP: Roel Zuñiga, APRN - CNP    Date of Admission: 7/31/2020    Chief Complaint: **Abd pain, and scaral decubiti      Hospital Course: ** apparently had been in a [facility] for rehab due to frankie in a COMA,  He has a sacral decubiti, it was thought to be infected. He has a wound vac, and a splenic abscess and PE with splenic v thrombosis. His platelets were extremely elevated. Seen by hematology, on full dose of lovenox. Last pm he became more hypotensive and tachycardic. He was transferred to MICU he went for surgery today, has a drain in RUQ draining clear yellow drainage, he had an IR cholecystostomy tube.        Subjective:   No acute events overnight    Medications:  Reviewed    Infusion Medications    dextrose       Scheduled Medications    insulin lispro  0-12 Units Subcutaneous TID WC    insulin glargine  15 Units Subcutaneous Nightly    apixaban  5 mg Oral BID    sodium chloride flush  10 mL Intravenous 2 times per day    DULoxetine  30 mg Oral Daily    fenofibrate  54 mg Oral Daily    fluticasone  1 spray Each Nostril Daily    [Held by provider] insulin lispro  5 Units Subcutaneous TID WC    [Held by provider] lisinopril  20 mg Oral Daily    pantoprazole  40 mg Oral QAM AC    pravastatin  20 mg Oral Nightly    pregabalin  300 mg Oral BID    piperacillin-tazobactam  3.375 g Intravenous Q8H    sodium chloride   Intravenous Q8H    potassium chloride  20 mEq Oral Daily     PRN Meds: iopamidol, sodium chloride flush, heparin flush, cyclobenzaprine, melatonin, glucose, dextrose, glucagon (rDNA), dextrose, acetaminophen **OR** acetaminophen, polyethylene glycol, promethazine **OR** ondansetron, morphine, oxyCODONE-acetaminophen      Intake/Output Summary (Last 24 hours) at 8/5/2020 0818  Last data filed at 8/5/2020 0600  Gross per 24 hour   Intake 1183.82 ml   Output 2770 ml   Net -1586.18 ml       Exam:    /67   Pulse 100   Temp 98.1 °F (36.7 °C) (Temporal)   Resp 16   Ht 6' 1\" (1.854 m)   Wt 256 lb (116.1 kg)   SpO2 97%   BMI 33.78 kg/m²       Gen: *well developed  HEENT: NC/AT, moist mucous membranes, no oropharyngeal erythema or exudate  Neck: supple, trachea midline, no anterior cervical or SC LAD  Heart:  Normal s1/s2, RRR, no murmurs, gallops, or rubs. Lungs:  *cta * bilaterally,   Abd: bowel sounds present, soft, pos tender, nondistended, no masses right cholecystostomy tube. Draining yellow drainage. Extrem:  No clubbing, cyanosis,  *pos* edema  Skin: no rashes or lesions  Psych: asleep}  Neuro: CN 2-12  grossly intact,   Capillary Refill: Brisk,< 3 seconds   Peripheral Pulses: +2 palpable, equal bilaterally              Labs:   Recent Labs     08/03/20  0529 08/04/20  0504 08/05/20  0401   WBC 10.3 9.5 7.7   HGB 8.0* 8.8* 8.9*   HCT 26.4* 28.7* 29.6*   * 899* 831*     Recent Labs     08/04/20  0840      K 4.0   CL 97*   CO2 27   BUN 7   CREATININE 0.8   CALCIUM 8.6     No results for input(s): AST, ALT, BILIDIR, BILITOT, ALKPHOS in the last 72 hours. Recent Labs     08/02/20  1230   INR 1.2     Recent Labs     08/02/20  1230   TROPONINI 0.07*     No results for input(s): AST, ALT, ALB, BILIDIR, BILITOT, ALKPHOS in the last 72 hours. No results for input(s): LACTA in the last 72 hours.   No results found for: Franne Bread  Lab Results   Component Value Date    AMMONIA 22.0 08/02/2020       Assessment:    Active Hospital Problems    Diagnosis Date Noted    Decubitus ulcer of sacral area [L89.159] 08/01/2020    Abnormal findings on diagnostic imaging of gall bladder [R93.2] 08/01/2020    Splenic infarction [D73.5] 08/01/2020    Cyst of spleen [D73.4] 08/01/2020    Splenic abscess [D73.3] 08/01/2020    Anemia [D64.9] 08/01/2020    Pulmonary embolism ([address] [I26.99] 08/01/2020    Enterocolitis [K52.9] 07/31/2020    Cellulitis of sacral region [L03.319] 07/02/2019    Type 2 diabetes mellitus ([facility] [E11.9] 04/08/2016

## 2020-08-05 NOTE — PROGRESS NOTES
Strength BUE:  Per OT eval   BLE:  5/5     Balance Sitting EOB:  SBA  Dynamic Standing:  Min A with [facility] Sitting EOB:  SBA  Dynamic Standing:  Min A with SPC Sitting EOB:  Independent   Dynamic Standing:  Modified Independent       Pt is A & O x 4   RASS:  0  CAM-ICU:  -  Sensation:  Pt denies numbness and tingling to extremities  Edema:  Unremarkable     Vitals:  Blood Pressure at rest 169/57 Blood Pressure post session --   Heart Rate at rest 130 bpm Heart Rate post session 119 bpm   SPO2 at rest 95% on 3 L  SPO2 post session 98% on 3 L    Hr increased to 150 bpm during ambulation and ADLs    Functional Status Score-Intensive Care Unit (FSS-ICU)   Rolling 5/7   Supine to sit transfer 5/7   Unsupported sitting  5/7   Sit to stand transfers 5/7   Ambulation 2/7   Total  22/35     Therapeutic Exercises:     BLE ROM    Patient education  Pt educated on safety during functional mobility    Patient response to education:   Pt verbalized understanding Pt demonstrated skill Pt requires further education in this area   Yes  Yes  Reinforce      ASSESSMENT:    Comments:  Pt received sitting EOB and agreeable to PT treatment with OT collaboration. Pt cleared for participation by RN prior to session. Vitals monitored during session. Pt doing a bit better today, still tachycardic with mobility. Able to use [facility] today instead of [facility].  Still a bit unsteady on his feet requiring hands on assistance for balance. Ambulated from bed to bathroom sink. Performed standing ADL at sink. Ambulated back to bedside. Increased gas movement during mobility. Pt reports nausea. RN aware. Pt assisted into side lying position at conclusion of session.      Treatment:  Patient practiced and was instructed in the following treatment:     Bed mobility training - pt given verbal and tactile cues to facilitate proper sequencing and safety during rolling and supine>sit as well as provided with physical assistance to complete task     Sitting

## 2020-08-05 NOTE — PLAN OF CARE
Problem: Pain:  Goal: Control of acute pain  Description: Control of acute pain  Outcome: Met This Shift     Problem: Pain:  Goal: Control of chronic pain  Description: Control of chronic pain  Outcome: Met This Shift     Problem: Falls - Risk of:  Goal: Will remain free from falls  Description: Will remain free from falls  8/5/2020 1425 by Severa Dark, RN  Outcome: Met This Shift     Problem: Falls - Risk of:  Goal: Absence of physical injury  Description: Absence of physical injury  Outcome: Met This Shift     Problem: Skin Integrity:  Goal: Will show no infection signs and symptoms  Description: Will show no infection signs and symptoms  Outcome: Met This Shift     Problem: Skin Integrity:  Goal: Absence of new skin breakdown  Description: Absence of new skin breakdown  Outcome: Met This Shift

## 2020-08-05 NOTE — FLOWSHEET NOTE
Inpatient Wound Care(initial evaluation)  [id number]    Admit Date: 2020  5:16 PM    Reason for consult:  Sacrum, buttocks    Significant history:    Chief Complaint: Abdominal pain and sacral pain  presented to [facility] from nursing facility with past medical history of DJD of the spine, osteoarthritis status post bilateral total hip replacement, obesity, diabetes and hypertension. Patient was admitted at [address] [place] [address] from  through 2020. According to patient, \"I  twice\" suspected Cardiac arrest x2 and was \"in coma for 3 weeks\". He developed sacral decubitus ulcer prior to being discharged from the hospital, his wound was infected and debridement done. He has been on Zosyn and has a wound VAC.  abnormal CT scan of his abdomen showing a large area of splenic abscess, splenic infarction with splenic vein thrombosis and PE. Patient was sent in from the nursing facility today because of abnormal CT scan of the abdomen. Wound history:  Admitted with wounds    Findings:     20 1050   Skin Integrity   Skin Integrity   (ole healed areas)   Location neck, head, hips, ankles   Wound 20 Sacrum   Date First Assessed/Time First Assessed: 20 2200   Present on Hospital Admission: Yes  Location: Sacrum   Wound Image    Wound Pressure Stage  4   Dressing Changed Changed/New   Dressing/Treatment Moisten with saline;Dry dressing   Wound Cleansed Rinsed/Irrigated with saline   Wound Length (cm) 4 cm   Wound Width (cm) 1.5 cm   Wound Depth (cm) 3 cm   Wound Surface Area (cm^2) 6 cm^2   Change in Wound Size % (l*w) 55.56   Wound Volume (cm^3) 18 cm^3   Wound Healing % 56   Wound Assessment Red   Drainage Amount Scant   Drainage Description Serosanguinous   Odor None   Loli-wound Assessment Intact   Red%Wound Bed 100   Wound 20 Buttocks Right; Inner   Date First Assessed/Time First Assessed: 20 1050   Present on Hospital Admission: Yes  Location: Buttocks  Wound Location Orientation: Right; Inner   Wound Image   (see sacrum)   Dressing Changed Changed/New   Dressing/Treatment Alginate;Dry dressing   Wound Cleansed Rinsed/Irrigated with saline   Wound Length (cm) 4.5 cm   Wound Width (cm) 2 cm   Wound Depth (cm) 0.1 cm   Wound Surface Area (cm^2) 9 cm^2   Wound Volume (cm^3) 0.9 cm^3   Wound Assessment Red   Drainage Amount Scant   Drainage Description Serosanguinous   Odor None   Loli-wound Assessment Intact   Non-staged Wound Description Partial thickness   Red%Wound Bed 100   Wound 08/05/20 Buttocks Left; Inner   Date First Assessed/Time First Assessed: 08/05/20 1050   Present on Hospital Admission: Yes  Location: Buttocks  Wound Location Orientation: Left; Inner   Wound Image   (see sacrum)   Dressing Changed Changed/New   Dressing/Treatment Alginate;Dry dressing   Wound Cleansed Rinsed/Irrigated with saline   Wound Length (cm) 4 cm   Wound Width (cm) 3 cm   Wound Depth (cm) 0.1 cm   Wound Surface Area (cm^2) 12 cm^2   Wound Volume (cm^3) 1.2 cm^3   Wound Assessment Red   Drainage Amount Scant   Drainage Description Serosanguinous   Odor None   Loli-wound Assessment Intact   Non-staged Wound Description Partial thickness   Red%Wound Bed 100     **Informed Consent**    The patient has given verbal consent to have photos taken of wounds and inserted into their chart as part of their permanent medical record for purposes of documentation, treatment management and/or medical review. All Images taken on 8/5/20 of patient name: Aida Harden were transmitted and stored on secured [employer] located within [facility] by a registered [employer] Mobile Application Device.      Impression:  Sacrum- stage 4    Plan:  Moist Kerlix dressing sacral wound  Opticell inner buttocks  Nursing home to re-apply wound vac when discharged  Need inner buttocks wounds close to sacrum to heal prior to application of wound vac for adequate seal  turns independently  Dietary consult    Bella Reed 8/5/2020 11:19 AM

## 2020-08-05 NOTE — PROGRESS NOTES
Subjective: The patient is awake and alert in bed, for possible transfer out of ICU today. Patient reports mild RUQ discomfort. No fever or chills. No problems overnight. Denies chest pain, angina, dyspnea or abdominal discomfort. No nausea or vomiting. Tolerating diet. Objective:    /67   Pulse 100   Temp 98.1 °F (36.7 °C) (Temporal)   Resp 16   Ht 6' 1\" (1.854 m)   Wt 256 lb (116.1 kg)   SpO2 99%   BMI 33.78 kg/m²     General: NAD  HEENT: No thrush or mucositis, EOMI, PERRLA  Heart:  RRR, no murmurs, gallops, or rubs.   Lungs:  CTA bilaterally, no wheeze, rales or rhonchi  Abd: bowel sounds present, nontender, nondistended, no masses, R choley drain, bilious drainage   Extrem:  No clubbing, cyanosis, or edema  Lymphatics: No palpable adenopathy in cervical and supraclavicular regions  Skin: Intact, no petechia or purpura    CBC with Differential:    Lab Results   Component Value Date    WBC 7.7 08/05/2020    RBC 3.32 08/05/2020    HGB 8.9 08/05/2020    HCT 29.6 08/05/2020     08/05/2020    MCV 89.2 08/05/2020    MCH 26.8 08/05/2020    MCHC 30.1 08/05/2020    RDW 14.8 08/05/2020    NRBC 0.9 08/01/2020    SEGSPCT 80 11/26/2013    LYMPHOPCT 29.8 08/05/2020    MONOPCT 17.2 08/05/2020    BASOPCT 0.8 08/05/2020    MONOSABS 1.33 08/05/2020    LYMPHSABS 2.30 08/05/2020    EOSABS 0.46 08/05/2020    BASOSABS 0.06 08/05/2020     CMP:    Lab Results   Component Value Date     08/04/2020    K 4.0 08/04/2020    K 3.7 08/02/2020    CL 97 08/04/2020    CO2 27 08/04/2020    BUN 7 08/04/2020    CREATININE 0.8 08/04/2020    GFRAA >60 08/04/2020    LABGLOM >60 08/04/2020    GLUCOSE 233 08/04/2020    GLUCOSE 125 05/11/2012    PROT 5.0 08/02/2020    LABALBU 2.5 08/02/2020    LABALBU 4.8 05/11/2012    CALCIUM 8.6 08/04/2020    BILITOT <0.2 08/02/2020    ALKPHOS 93 08/02/2020    AST <5 08/02/2020    ALT 5 08/02/2020          Current Facility-Administered Medications:     insulin glargine (LANTUS) injection vial 10 Units, 10 Units, Subcutaneous, Nightly, Sheryle Paschal, MD    insulin lispro (HUMALOG) injection vial 0-12 Units, 0-12 Units, Subcutaneous, TID WC, Sheryle Paschal, MD, Stopped at 08/04/20 1629    apixaban (ELIQUIS) tablet 5 mg, 5 mg, Oral, BID, Sheryle Paschal, MD, 5 mg at 08/05/20 0958    iopamidol (ISOVUE-300) 61 % injection 20 mL, 20 mL, Other, ONCE PRN, Lee Izaguirre MD    sodium chloride flush 0.9 % injection 10 mL, 10 mL, Intravenous, 2 times per day, Jovan Jane MD, 10 mL at 08/05/20 0957    sodium chloride flush 0.9 % injection 10 mL, 10 mL, Intravenous, PRN, Jovan Jane MD, 10 mL at 08/03/20 0529    heparin flush 100 UNIT/ML injection 100 Units, 100 Units, Intracatheter, PRN, Markus Jacob MD    cyclobenzaprine (FLEXERIL) tablet 5 mg, 5 mg, Oral, BID PRN, Corina Lea MD, 5 mg at 08/02/20 0857    DULoxetine (CYMBALTA) extended release capsule 30 mg, 30 mg, Oral, Daily, Corina Lea MD, 30 mg at 08/05/20 0957    fenofibrate (TRICOR) tablet 54 mg, 54 mg, Oral, Daily, Corina Lea MD, 54 mg at 08/05/20 0958    fluticasone (FLONASE) 50 MCG/ACT nasal spray 1 spray, 1 spray, Each Nostril, Daily, Corina Lea MD, 1 spray at 08/05/20 0959    [Held by provider] insulin lispro (HUMALOG) injection vial 5 Units, 5 Units, Subcutaneous, TID PB, Corina Lea MD, Stopped at 08/02/20 0859    [Held by provider] lisinopril (PRINIVIL;ZESTRIL) tablet 20 mg, 20 mg, Oral, Daily, Corina Lea MD, 20 mg at 08/01/20 0848    melatonin tablet 3 mg, 3 mg, Oral, Nightly PRN, Corina Lea MD    pantoprazole (PROTONIX) tablet 40 mg, 40 mg, Oral, QAM AC, Tyson Alvares MD, 40 mg at 08/05/20 0615    pravastatin (PRAVACHOL) tablet 20 mg, 20 mg, Oral, Nightly, Corina Lea MD, 20 mg at 08/04/20 2034    pregabalin (LYRICA) capsule 300 mg, 300 mg, Oral, BID, Corina Lea MD, 300 mg at 08/05/20 0958    glucose (GLUTOSE) 40 % oral gel 15 g, 15 g, Oral, PRN, Lyric Granados MD    dextrose 50 % IV solution, 12.5 g, Intravenous, PRN, Lyric Granados MD    glucagon (rDNA) injection 1 mg, 1 mg, Intramuscular, PRN, Lyric Granados MD    dextrose 5 % solution, 100 mL/hr, Intravenous, PRN, Lyric Granados MD    acetaminophen (TYLENOL) tablet 650 mg, 650 mg, Oral, Q6H PRN, 650 mg at 08/01/20 1954 **OR** acetaminophen (TYLENOL) suppository 650 mg, 650 mg, Rectal, Q6H PRN, Lyric Granados MD    polyethylene glycol (GLYCOLAX) packet 17 g, 17 g, Oral, Daily PRN, Lyric Granados MD    promethazine (PHENERGAN) tablet 12.5 mg, 12.5 mg, Oral, Q6H PRN **OR** ondansetron (ZOFRAN) injection 4 mg, 4 mg, Intravenous, Q6H PRN, Lyric Granados MD    potassium chloride (KLOR-CON M) extended release tablet 20 mEq, 20 mEq, Oral, Daily, Lucia Marylu, DO, 20 mEq at 08/05/20 1000    morphine (PF) injection 2 mg, 2 mg, Intravenous, Q4H PRN, Lyric Granados MD, 2 mg at 08/05/20 0401    oxyCODONE-acetaminophen (PERCOCET) 5-325 MG per tablet 1 tablet, 1 tablet, Oral, Q6H PRN, Lyric Granados MD, 1 tablet at 08/05/20 5318    Assessment:    Active Problems:    Essential hypertension    Class 1 obesity in adult    Type 2 diabetes mellitus (HCC)    Cellulitis of sacral region    Enterocolitis    Decubitus ulcer of sacral area    Abnormal findings on diagnostic imaging of gall bladder    Splenic infarction    Cyst of spleen    Splenic abscess    Anemia    Pulmonary embolism ([address]  Resolved Problems:    * No resolved hospital problems. *    51 year old gentleman with a normal cbc May 2020 platelets were 037,194 hgb 14 and wbc was 10 at that time. He reportedly was admitted to [facility], since that time with PE and splenic infarct followed by abscess/liquificatin of spleen. He reportedly was discharged from [facility] to 3 weeks ago on lovenox and never has failed oral anticoagulation.  Upon admission he was found to have thrombocytosis with platelet count 5,760,005 on 7-31-20 and CT abd/pelvis showing large collection fluid in splenic bed ?cystic vs abscess, possible cholecystitis, distended small bowel and sacral dec ulceration/infection. He was started on zosyn and thrombocytosis is improving with today platelet DNDFC 782,424. HIDA scan showed cholecystitis.  Thrombocytosis due to infection and functional loss of spleen improving with antibiotics. He is s/p CT-guided cholecystostomy tube placement 8/3/2020.     Plan:  -CBC daily, thrombocytosis likely will persist due to asplenia, but should be at reduced level after improves from acute issues.    -Transitioned to Eliquid BID  -Decubitus ulcer not thought to be infected  -Was getting zosyn for acute cholecystitis   -Will continue to follow    Electronically signed by IGOR Magallon NP on 8/5/2020 at 10:22 AM     Patient seen and examined. Agree with the above assessment plan by nurse practitioner. Patient will require lifelong anticoagulation and he may proceed with Eliquis 5 mg twice daily. He has a reactive thrombocytosis in the setting of infection and asplenia. He can follow-up with us as an outpatient. Currently we will sign off the case. Thank you very much for allowing us to participate in his care.     Ye Sexton MD

## 2020-08-05 NOTE — PROGRESS NOTES
RBC 3.32 08/05/2020    HGB 8.9 08/05/2020    HCT 29.6 08/05/2020    MCV 89.2 08/05/2020    MCH 26.8 08/05/2020    MCHC 30.1 08/05/2020    RDW 14.8 08/05/2020     08/05/2020    MPV 8.4 08/05/2020     CMP:    Lab Results   Component Value Date     08/04/2020    K 4.0 08/04/2020    K 3.7 08/02/2020    CL 97 08/04/2020    CO2 27 08/04/2020    BUN 7 08/04/2020    CREATININE 0.8 08/04/2020    GFRAA >60 08/04/2020    LABGLOM >60 08/04/2020    GLUCOSE 233 08/04/2020    GLUCOSE 125 05/11/2012    PROT 5.0 08/02/2020    LABALBU 2.5 08/02/2020    LABALBU 4.8 05/11/2012    CALCIUM 8.6 08/04/2020    BILITOT <0.2 08/02/2020    ALKPHOS 93 08/02/2020    AST <5 08/02/2020    ALT 5 08/02/2020     PT/INR:    Lab Results   Component Value Date    PROTIME 13.8 08/02/2020    INR 1.2 08/02/2020         Resident's Assessment and Plan     Zi Martel II   , 53 y.o. , male came with CC : abd pain , diarrhea x 2 weeks     has a past medical history of Accident, SIMÓN (acute kidney injury) ([facility] [facility] Allergic rhinitis, Chronic back pain, Depression, Diabetes mellitus ([facility] Difficulty sleeping, Displacement of lumbar intervertebral disc without myelopathy, Fibromyalgia, Fractured rib, H/O seasonal allergies, Head injury, Hyperlipidemia, Hypertension, Obesity (BMI 35.0-39.9 without comorbidity), Osteoarthritis, and Thoracic or lumbosacral neuritis or radiculitis, unspecified. Currently being managed for :    Neurology:   Rt hand wrist drop/ weakness  - CT shows no acute intracranial abnormality  - F/u with ortho/neuro when on floors     Cardiology:   Tachycardia   - Junctional tachycardia vs SVT. Improved, sinus tachy now. Likely 2/2 sepsis.  Echo 8/3 showing Mild LVH, LV septal motion disorder consistent with conduction disorder, EF 60-65%              - Cardiology following  HLD   - on fenofibrate     HTN  - Lisinopril held due to hypotension      Pulmonary:   Hx of PE 6/2020   - On therapeutic Lovenox at home, limited past Medicine      During multidisciplinary team rounds Cyndee Mattson is a 53 y.o. male was seen, examined and discussed. This is confirmation that I have personally seen and examined the patient and that the key elements of the encounter were performed by me (> 85 % time). The medications & laboratory data was discussed and adjusted where necessary. The radiographic images were reviewed or with radiologist or consultant if felt dis-concordant with the exam or history. The above findings were corroborated, plans confirmed and changes made if needed. Family is updated at the bedside as available. Key issues of the case were discussed among consultants. Critical Care time is documented if appropriate.       Angie Dykes DO, FACP, FCCP, [facility],

## 2020-08-05 NOTE — PROGRESS NOTES
[address] [place] [address] [facility]Y  Progress Note    SUBJECTIVE:  Chief Complaint   Patient presents with    Abnormal CT     sent from [place] with abnormal CT done today, only c/o are pain from pressure ulcers on coccyx     The patient is still in the ICU. He is tolerating antibiotics well. No nausea or vomiting. Less pain right upper quadrant and abdomen. Review of systems:  As stated above in the chief complaint, otherwise negative. Medications:  Scheduled Meds:   insulin glargine  10 Units Subcutaneous Nightly    insulin lispro  0-12 Units Subcutaneous TID WC    apixaban  5 mg Oral BID    sodium chloride flush  10 mL Intravenous 2 times per day    DULoxetine  30 mg Oral Daily    fenofibrate  54 mg Oral Daily    fluticasone  1 spray Each Nostril Daily    [Held by provider] insulin lispro  5 Units Subcutaneous TID WC    [Held by provider] lisinopril  20 mg Oral Daily    pantoprazole  40 mg Oral QAM AC    pravastatin  20 mg Oral Nightly    pregabalin  300 mg Oral BID    potassium chloride  20 mEq Oral Daily     Continuous Infusions:   dextrose       PRN Meds:iopamidol, sodium chloride flush, heparin flush, cyclobenzaprine, melatonin, glucose, dextrose, glucagon (rDNA), dextrose, acetaminophen **OR** acetaminophen, polyethylene glycol, promethazine **OR** ondansetron, morphine, oxyCODONE-acetaminophen    OBJECTIVE:  /67   Pulse 100   Temp 98.1 °F (36.7 °C) (Temporal)   Resp 16   Ht 6' 1\" (1.854 m)   Wt 256 lb (116.1 kg)   SpO2 99%   BMI 33.78 kg/m²   Temp  Av.9 °F (36.6 °C)  Min: 97.5 °F (36.4 °C)  Max: 98.1 °F (36.7 °C)  Constitutional: The patient is lying in bed in the ICU. He is awake and in no distress. Skin: Warm and dry. No rashes were noted. HEENT: Round and reactive pupils. Moist mucous membranes. No ulcerations or thrush. Neck: Supple to movements. Chest: No respiratory distress. Good breath sounds. No crackles.   Cardiovascular: Heart sounds rhythmic and regular. Abdomen: Positive bowel sounds to auscultation. Right upper quadrant cholecystostomy tube with bilious fluid. Slightly tender to palpation around the area. Coccygeal wound remains clean. No surrounding erythema. Extremities: Minimal edema. Lines: Left PICC (outside facility) placed around first week of July 2020.     Laboratory and Tests Review:  Lab Results   Component Value Date    WBC 7.7 08/05/2020    WBC 9.5 08/04/2020    WBC 10.3 08/03/2020    HGB 8.9 (L) 08/05/2020    HCT 29.6 (L) 08/05/2020    MCV 89.2 08/05/2020     (H) 08/05/2020     Lab Results   Component Value Date    NEUTROABS 3.55 08/05/2020    NEUTROABS 5.42 08/04/2020    NEUTROABS 5.75 08/03/2020     No results found for: [facility]  Lab Results   Component Value Date    ALT 5 08/02/2020    AST <5 08/02/2020    ALKPHOS 93 08/02/2020    BILITOT <0.2 08/02/2020     Lab Results   Component Value Date     08/04/2020    K 4.0 08/04/2020    K 3.7 08/02/2020    CL 97 08/04/2020    CO2 27 08/04/2020    BUN 7 08/04/2020    CREATININE 0.8 08/04/2020    CREATININE 0.9 08/02/2020    CREATININE 1.3 08/01/2020    GFRAA >60 08/04/2020    LABGLOM >60 08/04/2020    GLUCOSE 233 08/04/2020    GLUCOSE 125 05/11/2012    PROT 5.0 08/02/2020    LABALBU 2.5 08/02/2020    LABALBU 4.8 05/11/2012    CALCIUM 8.6 08/04/2020    BILITOT <0.2 08/02/2020    ALKPHOS 93 08/02/2020    AST <5 08/02/2020    ALT 5 08/02/2020     Lab Results   Component Value Date    CRP 3.9 (H) 08/01/2020    CRP 7.6 (H) 06/30/2019     Lab Results   Component Value Date    SEDRATE 28 (H) 06/30/2019     Radiology:  No new imaging studies    Microbiology:   Coccygeal wound 6/26/2020 [facility] Prevotella species, Fusobacterium, Klebsiella oxytoca, Enterobacter cloacae  Blood cultures at [facility] all negative  Blood cultures 7/31/2020: Negative so far  Coccyx wound culture 8/1/2020: Corynebacterium, pansensitive Pseudomonas aeruginosa  Biliary fluid 8/3/2020: Negative so

## 2020-08-06 ENCOUNTER — APPOINTMENT (OUTPATIENT)
Dept: CT IMAGING | Age: 45
DRG: 720 | End: 2020-08-06
Payer: MEDICAID

## 2020-08-06 ENCOUNTER — APPOINTMENT (OUTPATIENT)
Dept: GENERAL RADIOLOGY | Age: 45
DRG: 720 | End: 2020-08-06
Payer: MEDICAID

## 2020-08-06 LAB
ALBUMIN SERPL-MCNC: 3 G/DL (ref 3.5–5.2)
ALP BLD-CCNC: 127 U/L (ref 40–129)
ALT SERPL-CCNC: 5 U/L (ref 0–40)
ANION GAP SERPL CALCULATED.3IONS-SCNC: 19 MMOL/L (ref 7–16)
AST SERPL-CCNC: 8 U/L (ref 0–39)
BILIRUB SERPL-MCNC: 0.2 MG/DL (ref 0–1.2)
BUN BLDV-MCNC: 10 MG/DL (ref 6–20)
CALCIUM SERPL-MCNC: 9.3 MG/DL (ref 8.6–10.2)
CHLORIDE BLD-SCNC: 98 MMOL/L (ref 98–107)
CO2: 22 MMOL/L (ref 22–29)
CREAT SERPL-MCNC: 0.8 MG/DL (ref 0.7–1.2)
D DIMER: 578 NG/ML DDU
EKG ATRIAL RATE: 154 BPM
EKG P AXIS: 58 DEGREES
EKG P-R INTERVAL: 112 MS
EKG Q-T INTERVAL: 258 MS
EKG QRS DURATION: 84 MS
EKG QTC CALCULATION (BAZETT): 413 MS
EKG R AXIS: 61 DEGREES
EKG T AXIS: 39 DEGREES
EKG VENTRICULAR RATE: 154 BPM
GFR AFRICAN AMERICAN: >60
GFR NON-AFRICAN AMERICAN: >60 ML/MIN/1.73
GLUCOSE BLD-MCNC: 173 MG/DL (ref 74–99)
HCT VFR BLD CALC: 34.1 % (ref 37–54)
HEMOGLOBIN: 10.7 G/DL (ref 12.5–16.5)
MCH RBC QN AUTO: 27.3 PG (ref 26–35)
MCHC RBC AUTO-ENTMCNC: 31.4 % (ref 32–34.5)
MCV RBC AUTO: 87 FL (ref 80–99.9)
METER GLUCOSE: 163 MG/DL (ref 74–99)
METER GLUCOSE: 172 MG/DL (ref 74–99)
METER GLUCOSE: 172 MG/DL (ref 74–99)
PDW BLD-RTO: 14.9 FL (ref 11.5–15)
PLATELET # BLD: 911 E9/L (ref 130–450)
PMV BLD AUTO: 8.6 FL (ref 7–12)
POTASSIUM REFLEX MAGNESIUM: 4.5 MMOL/L (ref 3.5–5)
RBC # BLD: 3.92 E12/L (ref 3.8–5.8)
SARS-COV-2, NAAT: NOT DETECTED
SODIUM BLD-SCNC: 139 MMOL/L (ref 132–146)
TOTAL PROTEIN: 6.3 G/DL (ref 6.4–8.3)
TROPONIN: <0.01 NG/ML (ref 0–0.03)
WBC # BLD: 25.2 E9/L (ref 4.5–11.5)

## 2020-08-06 PROCEDURE — 6370000000 HC RX 637 (ALT 250 FOR IP): Performed by: INTERNAL MEDICINE

## 2020-08-06 PROCEDURE — 6360000004 HC RX CONTRAST MEDICATION: Performed by: RADIOLOGY

## 2020-08-06 PROCEDURE — 99232 SBSQ HOSP IP/OBS MODERATE 35: CPT | Performed by: INTERNAL MEDICINE

## 2020-08-06 PROCEDURE — 93005 ELECTROCARDIOGRAM TRACING: CPT | Performed by: INTERNAL MEDICINE

## 2020-08-06 PROCEDURE — 6360000002 HC RX W HCPCS: Performed by: SPECIALIST

## 2020-08-06 PROCEDURE — 80053 COMPREHEN METABOLIC PANEL: CPT

## 2020-08-06 PROCEDURE — 6360000002 HC RX W HCPCS: Performed by: INTERNAL MEDICINE

## 2020-08-06 PROCEDURE — 2580000003 HC RX 258: Performed by: SPECIALIST

## 2020-08-06 PROCEDURE — 84484 ASSAY OF TROPONIN QUANT: CPT

## 2020-08-06 PROCEDURE — 2700000000 HC OXYGEN THERAPY PER DAY

## 2020-08-06 PROCEDURE — U0002 COVID-19 LAB TEST NON-CDC: HCPCS

## 2020-08-06 PROCEDURE — 2580000003 HC RX 258: Performed by: RADIOLOGY

## 2020-08-06 PROCEDURE — 71045 X-RAY EXAM CHEST 1 VIEW: CPT

## 2020-08-06 PROCEDURE — 2580000003 HC RX 258: Performed by: INTERNAL MEDICINE

## 2020-08-06 PROCEDURE — 85027 COMPLETE CBC AUTOMATED: CPT

## 2020-08-06 PROCEDURE — 82962 GLUCOSE BLOOD TEST: CPT

## 2020-08-06 PROCEDURE — 36415 COLL VENOUS BLD VENIPUNCTURE: CPT

## 2020-08-06 PROCEDURE — 85378 FIBRIN DEGRADE SEMIQUANT: CPT

## 2020-08-06 PROCEDURE — 2060000000 HC ICU INTERMEDIATE R&B

## 2020-08-06 PROCEDURE — 36592 COLLECT BLOOD FROM PICC: CPT

## 2020-08-06 PROCEDURE — 71275 CT ANGIOGRAPHY CHEST: CPT

## 2020-08-06 RX ORDER — SODIUM CHLORIDE 0.9 % (FLUSH) 0.9 %
10 SYRINGE (ML) INJECTION
Status: COMPLETED | OUTPATIENT
Start: 2020-08-06 | End: 2020-08-06

## 2020-08-06 RX ADMIN — DULOXETINE HYDROCHLORIDE 30 MG: 30 CAPSULE, DELAYED RELEASE ORAL at 10:34

## 2020-08-06 RX ADMIN — PIPERACILLIN AND TAZOBACTAM 3.38 G: 3; .375 INJECTION, POWDER, FOR SOLUTION INTRAVENOUS at 15:43

## 2020-08-06 RX ADMIN — OXYCODONE AND ACETAMINOPHEN 1 TABLET: 5; 325 TABLET ORAL at 12:32

## 2020-08-06 RX ADMIN — Medication 10 ML: at 09:08

## 2020-08-06 RX ADMIN — APIXABAN 5 MG: 5 TABLET, FILM COATED ORAL at 10:34

## 2020-08-06 RX ADMIN — PREGABALIN 300 MG: 100 CAPSULE ORAL at 22:09

## 2020-08-06 RX ADMIN — POTASSIUM CHLORIDE 20 MEQ: 1500 TABLET, EXTENDED RELEASE ORAL at 10:34

## 2020-08-06 RX ADMIN — INSULIN LISPRO 2 UNITS: 100 INJECTION, SOLUTION INTRAVENOUS; SUBCUTANEOUS at 12:07

## 2020-08-06 RX ADMIN — PIPERACILLIN AND TAZOBACTAM 3.38 G: 3; .375 INJECTION, POWDER, FOR SOLUTION INTRAVENOUS at 22:36

## 2020-08-06 RX ADMIN — INSULIN LISPRO 2 UNITS: 100 INJECTION, SOLUTION INTRAVENOUS; SUBCUTANEOUS at 17:15

## 2020-08-06 RX ADMIN — INSULIN GLARGINE 10 UNITS: 100 INJECTION, SOLUTION SUBCUTANEOUS at 22:09

## 2020-08-06 RX ADMIN — FENOFIBRATE 54 MG: 54 TABLET ORAL at 10:35

## 2020-08-06 RX ADMIN — OXYCODONE AND ACETAMINOPHEN 1 TABLET: 5; 325 TABLET ORAL at 19:05

## 2020-08-06 RX ADMIN — PANTOPRAZOLE SODIUM 40 MG: 40 TABLET, DELAYED RELEASE ORAL at 06:30

## 2020-08-06 RX ADMIN — IOPAMIDOL 70 ML: 755 INJECTION, SOLUTION INTRAVENOUS at 09:08

## 2020-08-06 RX ADMIN — PRAVASTATIN SODIUM 20 MG: 20 TABLET ORAL at 23:32

## 2020-08-06 RX ADMIN — PREGABALIN 300 MG: 100 CAPSULE ORAL at 10:34

## 2020-08-06 RX ADMIN — Medication 10 ML: at 22:35

## 2020-08-06 RX ADMIN — Medication 10 ML: at 10:33

## 2020-08-06 RX ADMIN — ENOXAPARIN SODIUM 120 MG: 120 INJECTION SUBCUTANEOUS at 22:35

## 2020-08-06 RX ADMIN — MORPHINE SULFATE 2 MG: 2 INJECTION, SOLUTION INTRAMUSCULAR; INTRAVENOUS at 10:34

## 2020-08-06 RX ADMIN — MORPHINE SULFATE 2 MG: 2 INJECTION, SOLUTION INTRAMUSCULAR; INTRAVENOUS at 22:36

## 2020-08-06 ASSESSMENT — PAIN SCALES - GENERAL
PAINLEVEL_OUTOF10: 10
PAINLEVEL_OUTOF10: 9
PAINLEVEL_OUTOF10: 8
PAINLEVEL_OUTOF10: 9
PAINLEVEL_OUTOF10: 10
PAINLEVEL_OUTOF10: 7
PAINLEVEL_OUTOF10: 0

## 2020-08-06 NOTE — PROGRESS NOTES
[address] [place] [address] [facility]  LUDAIDA  Progress Note    SUBJECTIVE:  Chief Complaint   Patient presents with    Abnormal CT     sent from [place] with abnormal CT done today, only c/o are pain from pressure ulcers on coccyx     The patient was transferred out of the ICU. He notes that he has not been receiving any antibiotics since yesterday. Minimal abdominal pain. No nausea, vomiting or diarrhea. No fever. Review of systems:  As stated above in the chief complaint, otherwise negative. Medications:  Scheduled Meds:   insulin glargine  10 Units Subcutaneous Nightly    insulin lispro  0-12 Units Subcutaneous TID WC    apixaban  5 mg Oral BID    sodium chloride flush  10 mL Intravenous 2 times per day    DULoxetine  30 mg Oral Daily    fenofibrate  54 mg Oral Daily    fluticasone  1 spray Each Nostril Daily    [Held by provider] insulin lispro  5 Units Subcutaneous TID WC    [Held by provider] lisinopril  20 mg Oral Daily    pantoprazole  40 mg Oral QAM AC    pravastatin  20 mg Oral Nightly    pregabalin  300 mg Oral BID    potassium chloride  20 mEq Oral Daily     Continuous Infusions:   dextrose       PRN Meds:iopamidol, sodium chloride flush, heparin flush, cyclobenzaprine, melatonin, glucose, dextrose, glucagon (rDNA), dextrose, acetaminophen **OR** acetaminophen, polyethylene glycol, promethazine **OR** ondansetron, morphine, oxyCODONE-acetaminophen    OBJECTIVE:  /60   Pulse 111   Temp 97.8 °F (36.6 °C) (Temporal)   Resp 18   Ht 6' 1\" (1.854 m)   Wt 256 lb (116.1 kg)   SpO2 93%   BMI 33.78 kg/m²   Temp  Av °F (36.7 °C)  Min: 97.8 °F (36.6 °C)  Max: 98.2 °F (36.8 °C)  Constitutional: The patient is lying in bed. He is asleep but arousable. No distress. Skin: Warm and dry. No rashes were noted. HEENT: Round and reactive pupils. Moist mucous membranes. No ulcerations or thrush. Neck: Supple to movements. Chest: No respiratory distress.   Good breath sounds. No crackles. Cardiovascular: Heart sounds rhythmic and regular. Abdomen: Positive bowel sounds to auscultation. Right upper quadrant cholecystostomy tube with bilious fluid. Slightly tender to palpation around the area. Coccygeal wound remains clean. No surrounding erythema. Extremities: Minimal edema. Lines: Left PICC (outside facility) placed around first week of July 2020.     Laboratory and Tests Review:  Lab Results   Component Value Date    WBC 25.2 (H) 08/06/2020    WBC 7.7 08/05/2020    WBC 9.5 08/04/2020    HGB 10.7 (L) 08/06/2020    HCT 34.1 (L) 08/06/2020    MCV 87.0 08/06/2020     (H) 08/06/2020     Lab Results   Component Value Date    NEUTROABS 3.55 08/05/2020    NEUTROABS 5.42 08/04/2020    NEUTROABS 5.75 08/03/2020     No results found for: [facility]  Lab Results   Component Value Date    ALT 5 08/06/2020    AST 8 08/06/2020    ALKPHOS 127 08/06/2020    BILITOT 0.2 08/06/2020     Lab Results   Component Value Date     08/06/2020    K 4.5 08/06/2020    CL 98 08/06/2020    CO2 22 08/06/2020    BUN 10 08/06/2020    CREATININE 0.8 08/06/2020    CREATININE 0.8 08/04/2020    CREATININE 0.9 08/02/2020    GFRAA >60 08/06/2020    LABGLOM >60 08/06/2020    GLUCOSE 173 08/06/2020    GLUCOSE 125 05/11/2012    PROT 6.3 08/06/2020    LABALBU 3.0 08/06/2020    LABALBU 4.8 05/11/2012    CALCIUM 9.3 08/06/2020    BILITOT 0.2 08/06/2020    ALKPHOS 127 08/06/2020    AST 8 08/06/2020    ALT 5 08/06/2020     Lab Results   Component Value Date    CRP 3.9 (H) 08/01/2020    CRP 7.6 (H) 06/30/2019     Lab Results   Component Value Date    SEDRATE 28 (H) 06/30/2019     Radiology:  No new imaging studies    Microbiology:   Coccygeal wound 6/26/2020 [facility] Prevotella species, Fusobacterium, Klebsiella oxytoca, Enterobacter cloacae  Blood cultures at [facility] all negative  Blood cultures 7/31/2020: Negative so far  Coccyx wound culture 8/1/2020: Corynebacterium, pansensitive Pseudomonas aeruginosa  Biliary fluid 8/3/2020: Negative so far    No results for input(s): PROCAL in the last 72 hours. ASSESSMENT:  · Acute cholecystitis. Status post CT-guided cholecystostomy tube placement 8/3/2020. Cultures negative because of previous antibiotic treatment  · History of pulmonary embolism, treated at [facility]  · Large fluid collection over the splenic bed. Abscess seems to be less likely  · Leukocytosis associated to cholecystitis, rebounding  · Stage IV sacral decubitus ulcer. Colonized but not infected    PLAN:  · Zosyn was discontinued before the patient was transferred out of the ICU. There is no clear reasons why.   We will go ahead and resume it  · No need to treat organisms in the decubitus ulcer    Eloy Gonzalez  1:51 PM  8/6/2020

## 2020-08-06 NOTE — PROGRESS NOTES
Exam:    /60   Pulse 111   Temp 97.8 °F (36.6 °C) (Temporal)   Resp 18   Ht 6' 1\" (1.854 m)   Wt 256 lb (116.1 kg)   SpO2 93%   BMI 33.78 kg/m²       Gen: *well developed  HEENT: NC/AT, moist mucous membranes, no oropharyngeal erythema or exudate  Neck: supple, trachea midline, no anterior cervical or SC LAD  Heart:  Normal s1/s2, RRR, no murmurs, gallops, or rubs. Lungs:  *cta * bilaterally,   Abd: bowel sounds present, soft, pos tender, nondistended, no masses right cholecystostomy tube. Draining yellow drainage. Extrem:  No clubbing, cyanosis,  *pos* edema  Skin: no rashes or lesions  Psych: asleep}  Neuro: CN 2-12  grossly intact,   Capillary Refill: Brisk,< 3 seconds   Peripheral Pulses: +2 palpable, equal bilaterally              Labs:   Recent Labs     08/04/20  0504 08/05/20  0401 08/06/20  0100   WBC 9.5 7.7 25.2*   HGB 8.8* 8.9* 10.7*   HCT 28.7* 29.6* 34.1*   * 831* 911*     Recent Labs     08/04/20  0840 08/06/20  0100    139   K 4.0 4.5   CL 97* 98   CO2 27 22   BUN 7 10   CREATININE 0.8 0.8   CALCIUM 8.6 9.3     Recent Labs     08/06/20  0100   AST 8   ALT 5   BILITOT 0.2   ALKPHOS 127     No results for input(s): INR in the last 72 hours. Recent Labs     08/06/20  0100   TROPONINI <0.01     Recent Labs     08/06/20  0100   AST 8   ALT 5   BILITOT 0.2   ALKPHOS 127     No results for input(s): LACTA in the last 72 hours.   No results found for: Tennis Snide Results   Component Value Date    AMMONIA 22.0 08/02/2020       Assessment:    Active Hospital Problems    Diagnosis Date Noted    Decubitus ulcer of sacral area [L89.159] 08/01/2020    Abnormal findings on diagnostic imaging of gall bladder [R93.2] 08/01/2020    Splenic infarction [D73.5] 08/01/2020    Cyst of spleen [D73.4] 08/01/2020    Splenic abscess [D73.3] 08/01/2020    Anemia [D64.9] 08/01/2020    Pul[facility] embolism ([facility] [I26.99] 08/01/2020    Enterocolitis [K52.9] 07/31/2020    Cellulitis of sacral region [L03.319] 07/02/2019    Type 2 diabetes mellitus ([facility] [E11.9] 04/08/2016    Class 1 obesity in adult [E66.9] 12/17/2015    Essential hypertension [I10] 10/16/2015   R cholecystostomy tube. Plan:  1. Pulmonology following  2. Critical care following  3. General surgery following  4.  Infectious disease following  5. Hematology following  6. Medium dose correction insulin  7. Continue Zosyn  8. Continue heparin infusion  9. Continue Cymbalta, fenofibrate, metoprolol, pravastatin, Lyrica  10. Gait team, PT/OT       DVT Prophylaxis: *heparin  Diet: Diet NPO Effective Now Exceptions are: Sips with Meds  Code Status: Full Code     PT/OT Eval Status:  When stable           Electronically signed by Shanita Juarez MD on 8/6/2020 at 12:22 PM

## 2020-08-07 LAB
BASOPHILS ABSOLUTE: 0.07 E9/L (ref 0–0.2)
BASOPHILS RELATIVE PERCENT: 0.7 % (ref 0–2)
EOSINOPHILS ABSOLUTE: 0.32 E9/L (ref 0.05–0.5)
EOSINOPHILS RELATIVE PERCENT: 3.1 % (ref 0–6)
HCT VFR BLD CALC: 31.8 % (ref 37–54)
HEMOGLOBIN: 9.8 G/DL (ref 12.5–16.5)
IMMATURE GRANULOCYTES #: 0.07 E9/L
IMMATURE GRANULOCYTES %: 0.7 % (ref 0–5)
LYMPHOCYTES ABSOLUTE: 2.27 E9/L (ref 1.5–4)
LYMPHOCYTES RELATIVE PERCENT: 22.2 % (ref 20–42)
MCH RBC QN AUTO: 27.1 PG (ref 26–35)
MCHC RBC AUTO-ENTMCNC: 30.8 % (ref 32–34.5)
MCV RBC AUTO: 87.8 FL (ref 80–99.9)
METER GLUCOSE: 111 MG/DL (ref 74–99)
METER GLUCOSE: 136 MG/DL (ref 74–99)
METER GLUCOSE: 149 MG/DL (ref 74–99)
METER GLUCOSE: 225 MG/DL (ref 74–99)
MONOCYTES ABSOLUTE: 1.39 E9/L (ref 0.1–0.95)
MONOCYTES RELATIVE PERCENT: 13.6 % (ref 2–12)
NEUTROPHILS ABSOLUTE: 6.11 E9/L (ref 1.8–7.3)
NEUTROPHILS RELATIVE PERCENT: 59.7 % (ref 43–80)
PDW BLD-RTO: 14.8 FL (ref 11.5–15)
PLATELET # BLD: 914 E9/L (ref 130–450)
PMV BLD AUTO: 8.9 FL (ref 7–12)
RBC # BLD: 3.62 E12/L (ref 3.8–5.8)
WBC # BLD: 10.2 E9/L (ref 4.5–11.5)

## 2020-08-07 PROCEDURE — 6360000002 HC RX W HCPCS: Performed by: INTERNAL MEDICINE

## 2020-08-07 PROCEDURE — 82962 GLUCOSE BLOOD TEST: CPT

## 2020-08-07 PROCEDURE — 2060000000 HC ICU INTERMEDIATE R&B

## 2020-08-07 PROCEDURE — 6360000002 HC RX W HCPCS: Performed by: SPECIALIST

## 2020-08-07 PROCEDURE — 85025 COMPLETE CBC W/AUTO DIFF WBC: CPT

## 2020-08-07 PROCEDURE — 2580000003 HC RX 258: Performed by: SPECIALIST

## 2020-08-07 PROCEDURE — 6370000000 HC RX 637 (ALT 250 FOR IP): Performed by: INTERNAL MEDICINE

## 2020-08-07 PROCEDURE — 2580000003 HC RX 258: Performed by: INTERNAL MEDICINE

## 2020-08-07 PROCEDURE — 36415 COLL VENOUS BLD VENIPUNCTURE: CPT

## 2020-08-07 PROCEDURE — 99231 SBSQ HOSP IP/OBS SF/LOW 25: CPT | Performed by: INTERNAL MEDICINE

## 2020-08-07 RX ADMIN — ENOXAPARIN SODIUM 120 MG: 120 INJECTION SUBCUTANEOUS at 20:52

## 2020-08-07 RX ADMIN — ENOXAPARIN SODIUM 120 MG: 120 INJECTION SUBCUTANEOUS at 08:58

## 2020-08-07 RX ADMIN — Medication 10 ML: at 08:57

## 2020-08-07 RX ADMIN — PRAVASTATIN SODIUM 20 MG: 20 TABLET ORAL at 23:50

## 2020-08-07 RX ADMIN — SODIUM CHLORIDE: 9 INJECTION, SOLUTION INTRAVENOUS at 12:53

## 2020-08-07 RX ADMIN — INSULIN GLARGINE 10 UNITS: 100 INJECTION, SOLUTION SUBCUTANEOUS at 20:52

## 2020-08-07 RX ADMIN — PREGABALIN 300 MG: 100 CAPSULE ORAL at 08:57

## 2020-08-07 RX ADMIN — FENOFIBRATE 54 MG: 54 TABLET ORAL at 08:57

## 2020-08-07 RX ADMIN — INSULIN LISPRO 2 UNITS: 100 INJECTION, SOLUTION INTRAVENOUS; SUBCUTANEOUS at 11:28

## 2020-08-07 RX ADMIN — PIPERACILLIN AND TAZOBACTAM 3.38 G: 3; .375 INJECTION, POWDER, FOR SOLUTION INTRAVENOUS at 08:58

## 2020-08-07 RX ADMIN — MORPHINE SULFATE 2 MG: 2 INJECTION, SOLUTION INTRAMUSCULAR; INTRAVENOUS at 11:28

## 2020-08-07 RX ADMIN — OXYCODONE AND ACETAMINOPHEN 1 TABLET: 5; 325 TABLET ORAL at 08:58

## 2020-08-07 RX ADMIN — PANTOPRAZOLE SODIUM 40 MG: 40 TABLET, DELAYED RELEASE ORAL at 07:03

## 2020-08-07 RX ADMIN — OXYCODONE AND ACETAMINOPHEN 1 TABLET: 5; 325 TABLET ORAL at 22:41

## 2020-08-07 RX ADMIN — DULOXETINE HYDROCHLORIDE 30 MG: 30 CAPSULE, DELAYED RELEASE ORAL at 08:57

## 2020-08-07 RX ADMIN — OXYCODONE AND ACETAMINOPHEN 1 TABLET: 5; 325 TABLET ORAL at 02:30

## 2020-08-07 RX ADMIN — Medication 10 ML: at 20:52

## 2020-08-07 RX ADMIN — Medication 100 UNITS: at 05:50

## 2020-08-07 RX ADMIN — MORPHINE SULFATE 2 MG: 2 INJECTION, SOLUTION INTRAMUSCULAR; INTRAVENOUS at 18:40

## 2020-08-07 RX ADMIN — OXYCODONE AND ACETAMINOPHEN 1 TABLET: 5; 325 TABLET ORAL at 16:27

## 2020-08-07 RX ADMIN — PIPERACILLIN AND TAZOBACTAM 3.38 G: 3; .375 INJECTION, POWDER, FOR SOLUTION INTRAVENOUS at 16:29

## 2020-08-07 RX ADMIN — MORPHINE SULFATE 2 MG: 2 INJECTION, SOLUTION INTRAMUSCULAR; INTRAVENOUS at 05:34

## 2020-08-07 RX ADMIN — PREGABALIN 300 MG: 100 CAPSULE ORAL at 20:52

## 2020-08-07 ASSESSMENT — PAIN DESCRIPTION - PROGRESSION
CLINICAL_PROGRESSION: NOT CHANGED
CLINICAL_PROGRESSION: NOT CHANGED

## 2020-08-07 ASSESSMENT — PAIN DESCRIPTION - ONSET
ONSET: ON-GOING
ONSET: ON-GOING

## 2020-08-07 ASSESSMENT — PAIN DESCRIPTION - PAIN TYPE
TYPE: CHRONIC PAIN
TYPE: CHRONIC PAIN

## 2020-08-07 ASSESSMENT — PAIN DESCRIPTION - DESCRIPTORS
DESCRIPTORS: ACHING;DISCOMFORT
DESCRIPTORS: ACHING;DISCOMFORT

## 2020-08-07 ASSESSMENT — PAIN SCALES - GENERAL
PAINLEVEL_OUTOF10: 10
PAINLEVEL_OUTOF10: 9
PAINLEVEL_OUTOF10: 9
PAINLEVEL_OUTOF10: 7
PAINLEVEL_OUTOF10: 10
PAINLEVEL_OUTOF10: 9
PAINLEVEL_OUTOF10: 7
PAINLEVEL_OUTOF10: 6
PAINLEVEL_OUTOF10: 10
PAINLEVEL_OUTOF10: 9

## 2020-08-07 ASSESSMENT — PAIN DESCRIPTION - FREQUENCY
FREQUENCY: CONTINUOUS
FREQUENCY: CONTINUOUS

## 2020-08-07 ASSESSMENT — PAIN DESCRIPTION - LOCATION
LOCATION: BACK
LOCATION: BACK

## 2020-08-07 NOTE — CARE COORDINATION
Discharge plan is to return to [facility] at discharge. Awaiting final ID orders. Envelope and ambulance form in soft chart. Liza BREWER

## 2020-08-07 NOTE — PLAN OF CARE
Problem: Pain:  Goal: Pain level will decrease  Description: Pain level will decrease  8/7/2020 1620 by Amarilis Logan RN  Outcome: Met This Shift  8/7/2020 0321 by Reji Jones RN  Outcome: Ongoing  Goal: Control of acute pain  Description: Control of acute pain  8/7/2020 1620 by Amarilis Logan RN  Outcome: Met This Shift  8/7/2020 0321 by Reji Jones RN  Outcome: Ongoing  Goal: Control of chronic pain  Description: Control of chronic pain  8/7/2020 1620 by Amarilis Logan RN  Outcome: Met This Shift  8/7/2020 0321 by Reji Jones RN  Outcome: Ongoing     Problem: Falls - Risk of:  Goal: Will remain free from falls  Description: Will remain free from falls  8/7/2020 1620 by Amarilis Logan RN  Outcome: Met This Shift  8/7/2020 0321 by Reji Jones RN  Outcome: Met This Shift  Goal: Absence of physical injury  Description: Absence of physical injury  8/7/2020 1620 by Amarilis Logan RN  Outcome: Met This Shift  8/7/2020 0321 by Reji Jones RN  Outcome: Met This Shift     Problem: Increased nutrient needs (NI-5.1)  Goal: Food and/or Nutrient Delivery  Description: Individualized approach for food/nutrient provision.   8/7/2020 1102 by Napoleon Bell RD, LD  Outcome: Met This Shift

## 2020-08-07 NOTE — PROGRESS NOTES
Hospitalist Progress Note      PCP: IGOR Lucia - CNP    Date of Admission: 7/31/2020    Chief Complaint: **Abd pain, and scaral decubiti      Hospital Course: ** apparently had been in a [facility] for rehab due to frankie in a COMA,  He has a sacral decubiti, it was thought to be infected. He has a wound vac, and a splenic abscess and PE with splenic v thrombosis. His platelets were extremely elevated. Seen by hematology, on full dose of lovenox. Last pm he became more hypotensive and tachycardic. He was transferred to MICU he went for surgery today, has a drain in RUQ draining clear yellow drainage, he had an IR cholecystostomy tube.        Subjective:   No acute events overnight; reports continued intermittent pain, but pain regimen working well for him  States that he is too weak to get out of bed by himself, working with therapists     Medications:  Reviewed    Infusion Medications    dextrose       Scheduled Medications    piperacillin-tazobactam  3.375 g Intravenous Q8H    And    sodium chloride   Intravenous Q8H    enoxaparin  1 mg/kg Subcutaneous BID    insulin glargine  10 Units Subcutaneous Nightly    insulin lispro  0-12 Units Subcutaneous TID WC    sodium chloride flush  10 mL Intravenous 2 times per day    DULoxetine  30 mg Oral Daily    fenofibrate  54 mg Oral Daily    fluticasone  1 spray Each Nostril Daily    [Held by provider] insulin lispro  5 Units Subcutaneous TID WC    [Held by provider] lisinopril  20 mg Oral Daily    pantoprazole  40 mg Oral QAM AC    pravastatin  20 mg Oral Nightly    pregabalin  300 mg Oral BID    potassium chloride  20 mEq Oral Daily     PRN Meds: iopamidol, sodium chloride flush, heparin flush, cyclobenzaprine, melatonin, glucose, dextrose, glucagon (rDNA), dextrose, acetaminophen **OR** acetaminophen, polyethylene glycol, promethazine **OR** ondansetron, morphine, oxyCODONE-acetaminophen      Intake/Output Summary (Last 24 hours) at 8/7/2020 [address] filed at 8/7/2020 0908  Gross per 24 hour   Intake 460 ml   Output 1700 ml   Net -1240 ml       Exam:    /70   Pulse 115   Temp 96.9 °F (36.1 °C) (Temporal)   Resp 14   Ht 6' 1\" (1.854 m)   Wt 247 lb 6.4 oz (112.2 kg)   SpO2 95%   BMI 32.64 kg/m²       Gen: *well developed  HEENT: NC/AT, moist mucous membranes, no oropharyngeal erythema or exudate  Neck: supple, trachea midline, no anterior cervical or SC LAD  Heart:  Normal s1/s2, RRR, no murmurs, gallops, or rubs. Lungs:  *cta * bilaterally,   Abd: bowel sounds present, soft, pos tender, nondistended, no masses right cholecystostomy tube. Draining yellow drainage. Extrem:  No clubbing, cyanosis,  *pos* edema  Skin: no rashes or lesions  Psych: asleep}  Neuro: CN 2-12  grossly intact,   Capillary Refill: Brisk,< 3 seconds   Peripheral Pulses: +2 palpable, equal bilaterally              Labs:   Recent Labs     08/05/20  0401 08/06/20  0100 08/07/20  0954   WBC 7.7 25.2* 10.2   HGB 8.9* 10.7* 9.8*   HCT 29.6* 34.1* 31.8*   * 911* 914*     Recent Labs     08/06/20  0100      K 4.5   CL 98   CO2 22   BUN 10   CREATININE 0.8   CALCIUM 9.3     Recent Labs     08/06/20  0100   AST 8   ALT 5   BILITOT 0.2   ALKPHOS 127     No results for input(s): INR in the last 72 hours. Recent Labs     08/06/20  0100   TROPONINI <0.01     Recent Labs     08/06/20  0100   AST 8   ALT 5   BILITOT 0.2   ALKPHOS 127     No results for input(s): LACTA in the last 72 hours.   No results found for: Tamara Gray Results   Component Value Date    AMMONIA 22.0 08/02/2020       Assessment:    Active Hospital Problems    Diagnosis Date Noted    Decubitus ulcer of sacral area [L89.159] 08/01/2020    Abnormal findings on diagnostic imaging of gall bladder [R93.2] 08/01/2020    Splenic infarction [D73.5] 08/01/2020    Cyst of spleen [D73.4] 08/01/2020    Splenic abscess [D73.3] 08/01/2020    Anemia [D64.9] 08/01/2020    Pulmonary embolism ([facility] [I26.99] 08/01/2020    Enterocolitis [K52.9] 07/31/2020    Cellulitis of sacral region [L03.319] 07/02/2019    Type 2 diabetes mellitus ([facility] [E11.9] 04/08/2016    Class 1 obesity in adult [E66.9] 12/17/2015    Essential hypertension [I10] 10/16/2015   R cholecystostomy tube. Plan:  1. Pulmonology following  2. Critical care following  3. General surgery following  4.  Infectious disease following  5. Hematology following given recent splenic infarct; pt on therapeutic lovenox at this time   6. Glycemic control   7. Continue Zosyn  8. Continue Cymbalta, fenofibrate, metoprolol, pravastatin, Lyrica  9. Gait team, PT/OT   10.  Wound care for sacral decubitus ulcer     DVT Prophylaxis: *heparin  Diet: Diet NPO Effective Now Exceptions are: Sips with Meds  Code Status: Full Code     PT/OT Eval Status:  When stable           Electronically signed by Verner Lukes, MD on 8/7/2020 at 12:09 PM

## 2020-08-07 NOTE — PROGRESS NOTES
Pulmonology consult placed with Dr. Jerome Lama,    General Surgery consult placed with Dr. Shiloh Farrell RN

## 2020-08-07 NOTE — PROGRESS NOTES
Comprehensive Nutrition Assessment    Type and Reason for Visit:  Reassess    Nutrition Recommendations/Plan: Recommend and start Ensure High Protein supplement TID to help meet increased needs from wound healing. Kraig wound healing supplement not indicated at this time d/t noted sepsis. Nutrition Assessment:  Patients po intake seenms to be adequate and improving, averaging % of meals served ; pt at nutritional risk d/t increased needs from wound healing (Stage IV with wound vac) ; noted sepsis ; will start ONS    Malnutrition Assessment:  Malnutrition Status:  Insufficient data    Context:  Acute Illness     Findings of the 6 clinical characteristics of malnutrition:  Energy Intake:  No significant decrease in energy intake  Weight Loss:  Unable to assess(d/t lack of weight history)     Body Fat Loss:  Unable to assess(data not available to assess at this time)     Muscle Mass Loss:  Unable to assess(data not available to assess at this time)    Fluid Accumulation:  No significant fluid accumulation     Strength:  Not Performed    Estimated Daily Nutrient Needs:  Energy (kcal):  7315-1238 (REE 2057 x 1.3 SF); Weight Used for Energy Requirements:  Current     Protein (g):  125-150 (1.5-1.8g/kg IBW); Weight Used for Protein Requirements:  Ideal        Fluid (ml/day):  8456-4463; Weight Used for Fluid Requirements:  [place]      Nutrition Related Findings:  -I&Os (-7.2 L), no edema, A&O x 4, missing teeth, active BS, distended abd, gallbladder drain, wound vac, appetite improving      Wounds:  Multiple, Open Wounds, Stage IV(wounds x 3 noted)       Current Nutrition Therapies:    DIET GENERAL;     Anthropometric Measures:  · Height: 6' 1\" (185.4 cm)  · Current Body Weight: 247 lb (112 kg)(8/7/20, no method)   · Admission Body Weight: 229 lb (103.9 kg)(7/31/20, bedscale)    · Usual Body Weight: (unable to obtain ; no actual weights available in EMR history from previous encounters ; EMR shows past

## 2020-08-07 NOTE — PROGRESS NOTES
[address] [place] [address] [facility]  LUDAIDA  Progress Note    SUBJECTIVE:  Chief Complaint   Patient presents with    Abnormal CT     sent from [place] with abnormal CT done today, only c/o are pain from pressure ulcers on coccyx     The patient feels well. Tolerating antibiotics. Minimal abdominal pain. Review of systems:  As stated above in the chief complaint, otherwise negative. Medications:  Scheduled Meds:   piperacillin-tazobactam  3.375 g Intravenous Q8H    And    sodium chloride   Intravenous Q8H    enoxaparin  1 mg/kg Subcutaneous BID    insulin glargine  10 Units Subcutaneous Nightly    insulin lispro  0-12 Units Subcutaneous TID WC    sodium chloride flush  10 mL Intravenous 2 times per day    DULoxetine  30 mg Oral Daily    fenofibrate  54 mg Oral Daily    fluticasone  1 spray Each Nostril Daily    [Held by provider] insulin lispro  5 Units Subcutaneous TID WC    [Held by provider] lisinopril  20 mg Oral Daily    pantoprazole  40 mg Oral QAM AC    pravastatin  20 mg Oral Nightly    pregabalin  300 mg Oral BID    potassium chloride  20 mEq Oral Daily     Continuous Infusions:   dextrose       PRN Meds:iopamidol, sodium chloride flush, heparin flush, cyclobenzaprine, melatonin, glucose, dextrose, glucagon (rDNA), dextrose, acetaminophen **OR** acetaminophen, polyethylene glycol, promethazine **OR** ondansetron, morphine, oxyCODONE-acetaminophen    OBJECTIVE:  /70   Pulse 115   Temp 96.9 °F (36.1 °C) (Temporal)   Resp 14   Ht 6' 1\" (1.854 m)   Wt 247 lb 6.4 oz (112.2 kg)   SpO2 95%   BMI 32.64 kg/m²   Temp  Av.5 °F (36.4 °C)  Min: 96.9 °F (36.1 °C)  Max: 98.3 °F (36.8 °C)  Constitutional: The patient is lying in bed. Awake and alert. No distress. Visitor present. Skin: Warm and dry. No rashes were noted. HEENT: Round and reactive pupils. Moist mucous membranes. No ulcerations or thrush. Neck: Supple to movements.    Chest: No respiratory distress. Good breath sounds. No crackles. Cardiovascular: Heart sounds rhythmic and regular. Abdomen: Positive bowel sounds to auscultation. Right upper quadrant cholecystostomy tube with bilious fluid. Slightly tender to palpation around the area. Coccygeal wound remains clean. No surrounding erythema. Extremities: Minimal edema. Lines: Left PICC (outside facility) placed around first week of July 2020.     Laboratory and Tests Review:  Lab Results   Component Value Date    WBC 10.2 08/07/2020    WBC 25.2 (H) 08/06/2020    WBC 7.7 08/05/2020    HGB 9.8 (L) 08/07/2020    HCT 31.8 (L) 08/07/2020    MCV 87.8 08/07/2020     (H) 08/07/2020     Lab Results   Component Value Date    NEUTROABS 6.11 08/07/2020    NEUTROABS 3.55 08/05/2020    NEUTROABS 5.42 08/04/2020     No results found for: [facility]  Lab Results   Component Value Date    ALT 5 08/06/2020    AST 8 08/06/2020    ALKPHOS 127 08/06/2020    BILITOT 0.2 08/06/2020     Lab Results   Component Value Date     08/06/2020    K 4.5 08/06/2020    CL 98 08/06/2020    CO2 22 08/06/2020    BUN 10 08/06/2020    CREATININE 0.8 08/06/2020    CREATININE 0.8 08/04/2020    CREATININE 0.9 08/02/2020    GFRAA >60 08/06/2020    LABGLOM >60 08/06/2020    GLUCOSE 173 08/06/2020    GLUCOSE 125 05/11/2012    PROT 6.3 08/06/2020    LABALBU 3.0 08/06/2020    LABALBU 4.8 05/11/2012    CALCIUM 9.3 08/06/2020    BILITOT 0.2 08/06/2020    ALKPHOS 127 08/06/2020    AST 8 08/06/2020    ALT 5 08/06/2020     Lab Results   Component Value Date    CRP 3.9 (H) 08/01/2020    CRP 7.6 (H) 06/30/2019     Lab Results   Component Value Date    SEDRATE 28 (H) 06/30/2019     Radiology:  No new imaging studies    Microbiology:   Coccygeal wound 6/26/2020 [facility] Prevotella species, Fusobacterium, Klebsiella oxytoca, Enterobacter cloacae  Blood cultures at [facility] all negative  Blood cultures 7/31/2020: Negative so far  Coccyx wound culture 8/1/2020: Corynebacterium, pansensitive Pseudomonas

## 2020-08-07 NOTE — PROGRESS NOTES
Pulmonary [facility]                             Pulmonary Consult/Progress Note :    Chief complaint: transfer after RRT- hypotensive and tachycardic   History of Present Illness   He states SOB and weakness better  Had RUQ /gallbladder drain by IR   Has pressure ulcer on Coccyx     Physical Exam   · Vitals: /73   Pulse 124   Temp 97.6 °F (36.4 °C) (Temporal)   Resp 18   Ht 6' 1\" (1.854 m)   Wt 256 lb (116.1 kg)   SpO2 98%   BMI 33.78 kg/m²           · General Appearance: alert and oriented to person, place and time, well developed and well- nourished, in no acute distress. Pleasant 53year old male. · Head: normocephalic and atraumatic  · Eyes: pupils equal, round, and reactive to light, extraocular eye movements intact, conjunctivae normal  · ENT: tympanic membrane, external ear and ear canal normal bilaterally, nose without deformity, nasal mucosa and turbinates normal without polyps  · Neck: supple and non-tender without mass, no thyromegaly or thyroid nodules, no cervical lymphadenopathy  · Pulmonary/Chest: clear to auscultation bilaterally- no wheezes, rales or rhonchi, normal air movement, no respiratory distress  · Cardiovascular: tachycardic rate, regular rhythm, normal S1 and S2, no murmurs, rubs, clicks, or gallops, distal pulses intact, no carotid bruits  · Abdomen: soft, tender to palpation, non-distended, normal bowel sounds, no masses or organomegaly  · Extremities: no cyanosis, clubbing or edema  · Musculoskeletal: normal range of motion, no joint swelling, deformity or tenderness  · Neurologic: AAOx3; patient R hand weak and loss of movement compared to left side.      Basic Labs  CBC:   Lab Results   Component Value Date    WBC 25.2 08/06/2020    RBC 3.92 08/06/2020    HGB 10.7 08/06/2020    HCT 34.1 08/06/2020    MCV 87.0 08/06/2020    RDW 14.9 08/06/2020     08/06/2020     CMP:  Lab Results   Component Value Date     08/06/2020    K 4.5 08/06/2020    CL 98 08/06/2020    CO2 22 08/06/2020    BUN 10 08/06/2020    PROT 6.3 08/06/2020         Assessment and Plan       Sepsis/shock, Likely 2/2 acute cholecystis   ID following ,on Zosyn    IR gallbladder drainage   wean pressors       Hx of PE 6/2020  - on therapeutic lovenox--> hematology eval   -Recent splenic infarct   - continue to monitor    Sacral decubitus ulcer, stage III, does not look infected, with good granulation tissue   - packed in MICU and dressing applied    - Continue to keep clean, dry, intact  - wound care consult       Joshua Bentley MD,FCCP  Pulmonary&Critical Care Medicine   Director of [address] [place] [address] Director of [address] [place] [address]    Carol Shaw

## 2020-08-08 LAB
ANAEROBIC CULTURE: NORMAL
BASOPHILS ABSOLUTE: 0.1 E9/L (ref 0–0.2)
BASOPHILS RELATIVE PERCENT: 1.2 % (ref 0–2)
EOSINOPHILS ABSOLUTE: 0.52 E9/L (ref 0.05–0.5)
EOSINOPHILS RELATIVE PERCENT: 6.2 % (ref 0–6)
HCT VFR BLD CALC: 32.5 % (ref 37–54)
HEMOGLOBIN: 10 G/DL (ref 12.5–16.5)
IMMATURE GRANULOCYTES #: 0.06 E9/L
IMMATURE GRANULOCYTES %: 0.7 % (ref 0–5)
LYMPHOCYTES ABSOLUTE: 3.2 E9/L (ref 1.5–4)
LYMPHOCYTES RELATIVE PERCENT: 38.2 % (ref 20–42)
MCH RBC QN AUTO: 27.2 PG (ref 26–35)
MCHC RBC AUTO-ENTMCNC: 30.8 % (ref 32–34.5)
MCV RBC AUTO: 88.6 FL (ref 80–99.9)
METER GLUCOSE: 114 MG/DL (ref 74–99)
METER GLUCOSE: 163 MG/DL (ref 74–99)
METER GLUCOSE: 172 MG/DL (ref 74–99)
METER GLUCOSE: 175 MG/DL (ref 74–99)
MONOCYTES ABSOLUTE: 1.24 E9/L (ref 0.1–0.95)
MONOCYTES RELATIVE PERCENT: 14.8 % (ref 2–12)
NEUTROPHILS ABSOLUTE: 3.25 E9/L (ref 1.8–7.3)
NEUTROPHILS RELATIVE PERCENT: 38.9 % (ref 43–80)
PDW BLD-RTO: 14.8 FL (ref 11.5–15)
PLATELET # BLD: 952 E9/L (ref 130–450)
PMV BLD AUTO: 8.7 FL (ref 7–12)
RBC # BLD: 3.67 E12/L (ref 3.8–5.8)
WBC # BLD: 8.4 E9/L (ref 4.5–11.5)

## 2020-08-08 PROCEDURE — 2060000000 HC ICU INTERMEDIATE R&B

## 2020-08-08 PROCEDURE — 6360000002 HC RX W HCPCS: Performed by: SPECIALIST

## 2020-08-08 PROCEDURE — 6370000000 HC RX 637 (ALT 250 FOR IP): Performed by: INTERNAL MEDICINE

## 2020-08-08 PROCEDURE — 82962 GLUCOSE BLOOD TEST: CPT

## 2020-08-08 PROCEDURE — 36415 COLL VENOUS BLD VENIPUNCTURE: CPT

## 2020-08-08 PROCEDURE — 6360000002 HC RX W HCPCS: Performed by: INTERNAL MEDICINE

## 2020-08-08 PROCEDURE — 85025 COMPLETE CBC W/AUTO DIFF WBC: CPT

## 2020-08-08 PROCEDURE — 2580000003 HC RX 258: Performed by: INTERNAL MEDICINE

## 2020-08-08 PROCEDURE — 2580000003 HC RX 258: Performed by: SPECIALIST

## 2020-08-08 RX ORDER — 0.9 % SODIUM CHLORIDE 0.9 %
500 INTRAVENOUS SOLUTION INTRAVENOUS ONCE
Status: COMPLETED | OUTPATIENT
Start: 2020-08-08 | End: 2020-08-08

## 2020-08-08 RX ADMIN — FENOFIBRATE 54 MG: 54 TABLET ORAL at 08:48

## 2020-08-08 RX ADMIN — OXYCODONE AND ACETAMINOPHEN 1 TABLET: 5; 325 TABLET ORAL at 19:51

## 2020-08-08 RX ADMIN — Medication 10 ML: at 11:13

## 2020-08-08 RX ADMIN — PANTOPRAZOLE SODIUM 40 MG: 40 TABLET, DELAYED RELEASE ORAL at 06:31

## 2020-08-08 RX ADMIN — FLUTICASONE PROPIONATE 1 SPRAY: 50 SPRAY, METERED NASAL at 08:50

## 2020-08-08 RX ADMIN — PIPERACILLIN AND TAZOBACTAM 3.38 G: 3; .375 INJECTION, POWDER, FOR SOLUTION INTRAVENOUS at 01:35

## 2020-08-08 RX ADMIN — INSULIN GLARGINE 10 UNITS: 100 INJECTION, SOLUTION SUBCUTANEOUS at 20:35

## 2020-08-08 RX ADMIN — ENOXAPARIN SODIUM 120 MG: 120 INJECTION SUBCUTANEOUS at 08:48

## 2020-08-08 RX ADMIN — DULOXETINE HYDROCHLORIDE 30 MG: 30 CAPSULE, DELAYED RELEASE ORAL at 08:47

## 2020-08-08 RX ADMIN — POTASSIUM CHLORIDE 20 MEQ: 1500 TABLET, EXTENDED RELEASE ORAL at 08:47

## 2020-08-08 RX ADMIN — INSULIN LISPRO 2 UNITS: 100 INJECTION, SOLUTION INTRAVENOUS; SUBCUTANEOUS at 17:09

## 2020-08-08 RX ADMIN — INSULIN LISPRO 2 UNITS: 100 INJECTION, SOLUTION INTRAVENOUS; SUBCUTANEOUS at 11:28

## 2020-08-08 RX ADMIN — Medication 10 ML: at 20:36

## 2020-08-08 RX ADMIN — ENOXAPARIN SODIUM 120 MG: 120 INJECTION SUBCUTANEOUS at 20:35

## 2020-08-08 RX ADMIN — SODIUM CHLORIDE: 9 INJECTION, SOLUTION INTRAVENOUS at 22:04

## 2020-08-08 RX ADMIN — MORPHINE SULFATE 2 MG: 2 INJECTION, SOLUTION INTRAMUSCULAR; INTRAVENOUS at 11:12

## 2020-08-08 RX ADMIN — PIPERACILLIN AND TAZOBACTAM 3.38 G: 3; .375 INJECTION, POWDER, FOR SOLUTION INTRAVENOUS at 17:17

## 2020-08-08 RX ADMIN — OXYCODONE AND ACETAMINOPHEN 1 TABLET: 5; 325 TABLET ORAL at 13:40

## 2020-08-08 RX ADMIN — SODIUM CHLORIDE, PRESERVATIVE FREE 10 ML: 5 INJECTION INTRAVENOUS at 04:00

## 2020-08-08 RX ADMIN — PIPERACILLIN AND TAZOBACTAM 3.38 G: 3; .375 INJECTION, POWDER, FOR SOLUTION INTRAVENOUS at 08:48

## 2020-08-08 RX ADMIN — MORPHINE SULFATE 2 MG: 2 INJECTION, SOLUTION INTRAMUSCULAR; INTRAVENOUS at 17:09

## 2020-08-08 RX ADMIN — PRAVASTATIN SODIUM 20 MG: 20 TABLET ORAL at 23:01

## 2020-08-08 RX ADMIN — PREGABALIN 300 MG: 100 CAPSULE ORAL at 08:47

## 2020-08-08 RX ADMIN — SODIUM CHLORIDE 500 ML: 9 INJECTION, SOLUTION INTRAVENOUS at 13:02

## 2020-08-08 RX ADMIN — PREGABALIN 300 MG: 100 CAPSULE ORAL at 20:35

## 2020-08-08 RX ADMIN — OXYCODONE AND ACETAMINOPHEN 1 TABLET: 5; 325 TABLET ORAL at 07:29

## 2020-08-08 RX ADMIN — MORPHINE SULFATE 2 MG: 2 INJECTION, SOLUTION INTRAMUSCULAR; INTRAVENOUS at 23:01

## 2020-08-08 RX ADMIN — MORPHINE SULFATE 2 MG: 2 INJECTION, SOLUTION INTRAMUSCULAR; INTRAVENOUS at 04:00

## 2020-08-08 ASSESSMENT — PAIN DESCRIPTION - ORIENTATION
ORIENTATION: LOWER

## 2020-08-08 ASSESSMENT — PAIN - FUNCTIONAL ASSESSMENT
PAIN_FUNCTIONAL_ASSESSMENT: PREVENTS OR INTERFERES SOME ACTIVE ACTIVITIES AND ADLS

## 2020-08-08 ASSESSMENT — PAIN DESCRIPTION - ONSET
ONSET: ON-GOING

## 2020-08-08 ASSESSMENT — PAIN DESCRIPTION - DESCRIPTORS
DESCRIPTORS: ACHING;DISCOMFORT

## 2020-08-08 ASSESSMENT — PAIN SCALES - GENERAL
PAINLEVEL_OUTOF10: 9
PAINLEVEL_OUTOF10: 7
PAINLEVEL_OUTOF10: 10
PAINLEVEL_OUTOF10: 7
PAINLEVEL_OUTOF10: 7
PAINLEVEL_OUTOF10: 9
PAINLEVEL_OUTOF10: 10
PAINLEVEL_OUTOF10: 9
PAINLEVEL_OUTOF10: 10
PAINLEVEL_OUTOF10: 9
PAINLEVEL_OUTOF10: 7

## 2020-08-08 ASSESSMENT — PAIN DESCRIPTION - PROGRESSION
CLINICAL_PROGRESSION: GRADUALLY WORSENING

## 2020-08-08 ASSESSMENT — PAIN DESCRIPTION - FREQUENCY
FREQUENCY: CONTINUOUS

## 2020-08-08 ASSESSMENT — PAIN DESCRIPTION - LOCATION
LOCATION: BACK
LOCATION: WRIST
LOCATION: BACK

## 2020-08-08 ASSESSMENT — PAIN DESCRIPTION - PAIN TYPE
TYPE: CHRONIC PAIN

## 2020-08-08 NOTE — PLAN OF CARE
Problem: Pain:  Goal: Pain level will decrease  Description: Pain level will decrease  Outcome: Met This Shift     Problem: Falls - Risk of:  Goal: Will remain free from falls  Description: Will remain free from falls  8/8/2020 1108 by Kelley Dugan RN  Outcome: Met This Shift  8/8/2020 0200 by Burke Espinoza  Outcome: Met This Shift     Problem: Falls - Risk of:  Goal: Absence of physical injury  Description: Absence of physical injury  8/8/2020 0200 by Burke Espinoza  Outcome: Met This Shift     Problem: Skin Integrity:  Goal: Will show no infection signs and symptoms  Description: Will show no infection signs and symptoms  Outcome: Met This Shift     Problem: Skin Integrity:  Goal: Absence of new skin breakdown  Description: Absence of new skin breakdown  8/8/2020 0200 by Burke Espinoza  Outcome: Met This Shift

## 2020-08-08 NOTE — PLAN OF CARE
Problem: Pain:  Goal: Pain level will decrease  Description: Pain level will decrease  8/8/2020 1706 by Kike Brower RN  Outcome: Met This Shift  8/8/2020 1108 by Laura Remy RN  Outcome: Met This Shift  Goal: Control of acute pain  Description: Control of acute pain  Outcome: Met This Shift  Goal: Control of chronic pain  Description: Control of chronic pain  Outcome: Met This Shift     Problem: Falls - Risk of:  Goal: Will remain free from falls  Description: Will remain free from falls  8/8/2020 1706 by Kike Brower RN  Outcome: Met This Shift  8/8/2020 1108 by Laura Remy RN  Outcome: Met This Shift  Goal: Absence of physical injury  Description: Absence of physical injury  Outcome: Met This Shift     Problem: Skin Integrity:  Goal: Will show no infection signs and symptoms  Description: Will show no infection signs and symptoms  8/8/2020 1108 by Laura Remy RN  Outcome: Met This Shift

## 2020-08-08 NOTE — PROGRESS NOTES
Hospitalist Progress Note      PCP: Milli Robledo, APRN - CNP    Date of Admission: 7/31/2020    Chief Complaint: **Abd pain, and scaral decubiti      Hospital Course: ** apparently had been in a [facility] for rehab due to frankie in a COMA,  He has a sacral decubiti, it was thought to be infected. He has a wound vac, and a splenic abscess and PE with splenic v thrombosis. His platelets were extremely elevated. Seen by hematology, on full dose of lovenox. He was transferred to MICU and had an IR cholecystostomy tube.        Subjective:   No acute events overnight; reports continued intermittent pain, but pain regimen working well for him  States that he is too weak to get out of bed by himself, working with therapists     Medications:  Reviewed    Infusion Medications    dextrose       Scheduled Medications    piperacillin-tazobactam  3.375 g Intravenous Q8H    And    sodium chloride   Intravenous Q8H    enoxaparin  1 mg/kg Subcutaneous BID    insulin glargine  10 Units Subcutaneous Nightly    insulin lispro  0-12 Units Subcutaneous TID WC    sodium chloride flush  10 mL Intravenous 2 times per day    DULoxetine  30 mg Oral Daily    fenofibrate  54 mg Oral Daily    fluticasone  1 spray Each Nostril Daily    [Held by provider] insulin lispro  5 Units Subcutaneous TID WC    [Held by provider] lisinopril  20 mg Oral Daily    pantoprazole  40 mg Oral QAM AC    pravastatin  20 mg Oral Nightly    pregabalin  300 mg Oral BID    potassium chloride  20 mEq Oral Daily     PRN Meds: iopamidol, sodium chloride flush, heparin flush, cyclobenzaprine, melatonin, glucose, dextrose, glucagon (rDNA), dextrose, acetaminophen **OR** acetaminophen, polyethylene glycol, promethazine **OR** ondansetron, morphine, oxyCODONE-acetaminophen      Intake/Output Summary (Last 24 hours) at 8/8/2020 1213  Last data filed at 8/8/2020 0646  Gross per 24 hour   Intake 520 ml   Output 2275 ml   Net -1755 ml       Exam:    /77 Pulse 130   Temp 97.6 °F (36.4 °C) (Temporal)   Resp 18   Ht 6' 1\" (1.854 m)   Wt 242 lb 2 oz (109.8 kg)   SpO2 97%   BMI 31.94 kg/m²       Gen: *well developed  HEENT: NC/AT, moist mucous membranes, no oropharyngeal erythema or exudate  Neck: supple, trachea midline, no anterior cervical or SC LAD  Heart:  Normal s1/s2, RRR, no murmurs, gallops, or rubs. Lungs:  *cta * bilaterally,   Abd: bowel sounds present, soft, pos tender, nondistended, no masses right cholecystostomy tube. Draining yellow drainage. Extrem:  No clubbing, cyanosis,  *pos* edema  Skin: no rashes or lesions  Psych: asleep}  Neuro: CN 2-12  grossly intact,   Capillary Refill: Brisk,< 3 seconds   Peripheral Pulses: +2 palpable, equal bilaterally              Labs:   Recent Labs     08/06/20  0100 08/07/20  0954 08/08/20  0557   WBC 25.2* 10.2 8.4   HGB 10.7* 9.8* 10.0*   HCT 34.1* 31.8* 32.5*   * 914* 952*     Recent Labs     08/06/20  0100      K 4.5   CL 98   CO2 22   BUN 10   CREATININE 0.8   CALCIUM 9.3     Recent Labs     08/06/20  0100   AST 8   ALT 5   BILITOT 0.2   ALKPHOS 127     No results for input(s): INR in the last 72 hours. Recent Labs     08/06/20  0100   TROPONINI <0.01     Recent Labs     08/06/20  0100   AST 8   ALT 5   BILITOT 0.2   ALKPHOS 127     No results for input(s): LACTA in the last 72 hours.   No results found for: Lisandra Mccoy  Lab Results   Component Value Date    AMMONIA 22.0 08/02/2020       Assessment:    Active Hospital Problems    Diagnosis Date Noted    Decubitus ulcer of sacral area [L89.159] 08/01/2020    Abnormal findings on diagnostic imaging of gall bladder [R93.2] 08/01/2020    Splenic infarction [D73.5] 08/01/2020    Cyst of spleen [D73.4] 08/01/2020    Splenic abscess [D73.3] 08/01/2020    Anemia [D64.9] 08/01/2020    Pulmonary embolism ([address] [I26.99] 08/01/2020    Enterocolitis [K52.9] 07/31/2020    Cellulitis of sacral region [L03.319] 07/02/2019    Type 2 diabetes

## 2020-08-08 NOTE — PLAN OF CARE
Problem: Pain:  Goal: Pain level will decrease  Description: Pain level will decrease  8/7/2020 1620 by Frankey Rochester, RN  Outcome: Met This Shift  Goal: Control of acute pain  Description: Control of acute pain  8/7/2020 1620 by Frankey Rochester, RN  Outcome: Met This Shift  Goal: Control of chronic pain  Description: Control of chronic pain  8/7/2020 1620 by Frankey Rochester, RN  Outcome: Met This Shift     Problem: Falls - Risk of:  Goal: Will remain free from falls  Description: Will remain free from falls  8/8/2020 0200 by Stanford Dakins  Outcome: Met This Shift  8/7/2020 1620 by Frankey Rochester, RN  Outcome: Met This Shift  Goal: Absence of physical injury  Description: Absence of physical injury  8/8/2020 0200 by Stanford Dakins  Outcome: Met This Shift  8/7/2020 1620 by Frankey Rochester, RN  Outcome: Met This Shift     Problem: Skin Integrity:  Goal: Absence of new skin breakdown  Description: Absence of new skin breakdown  Outcome: Met This Shift

## 2020-08-08 NOTE — PROGRESS NOTES
[address] [place] [address] [facility]  LUDAIDA  Progress Note      Chief Complaint   Patient presents with    Abnormal CT     sent from [place] with abnormal CT done today, only c/o are pain from pressure ulcers on coccyx       SUBJECTIVE:      Patient is tolerating medications. No reported adverse drug reactions. No problems overnight. Review of systems:    As stated above in the chief complaint, otherwise negative. Medications:    Scheduled Meds:   piperacillin-tazobactam  3.375 g Intravenous Q8H    And    sodium chloride   Intravenous Q8H    enoxaparin  1 mg/kg Subcutaneous BID    insulin glargine  10 Units Subcutaneous Nightly    insulin lispro  0-12 Units Subcutaneous TID WC    sodium chloride flush  10 mL Intravenous 2 times per day    DULoxetine  30 mg Oral Daily    fenofibrate  54 mg Oral Daily    fluticasone  1 spray Each Nostril Daily    [Held by provider] insulin lispro  5 Units Subcutaneous TID WC    [Held by provider] lisinopril  20 mg Oral Daily    pantoprazole  40 mg Oral QAM AC    pravastatin  20 mg Oral Nightly    pregabalin  300 mg Oral BID    potassium chloride  20 mEq Oral Daily     Continuous Infusions:   dextrose       PRN Meds:iopamidol, sodium chloride flush, heparin flush, cyclobenzaprine, melatonin, glucose, dextrose, glucagon (rDNA), dextrose, acetaminophen **OR** acetaminophen, polyethylene glycol, promethazine **OR** ondansetron, morphine, oxyCODONE-acetaminophen  Prior to Admission medications    Medication Sig Start Date End Date Taking?  Authorizing Provider   enoxaparin (LOVENOX) 120 MG/0.8ML injection Inject 1 mg/kg into the skin every 12 hours   Yes Historical Provider, MD   insulin lispro (HUMALOG) 100 UNIT/ML injection cartridge Inject 5 Units into the skin 3 times daily (before meals)   Yes Historical Provider, MD   insulin glargine (LANTUS) 100 UNIT/ML injection vial Inject 20 Units into the skin nightly   Yes Historical Provider, MD loperamide (LOPERAMIDE A-D) 2 MG tablet Take 2 mg by mouth 4 times daily as needed for Diarrhea   Yes Historical Provider, MD   Melatonin-Pyridoxine (MELATIN) 3-1 MG TABS Take 3 mg by mouth   Yes Historical Provider, MD   nystatin (MYCOSTATIN) 584679 UNIT/GM cream Apply topically 2 times daily Apply topically 2 times daily. Yes Historical Provider, MD   oxyCODONE-acetaminophen (PERCOCET) 5-325 MG per tablet Take 1 tablet by mouth every 6 hours as needed for Pain.    Yes Historical Provider, MD   pantoprazole (PROTONIX) 40 MG tablet Take 40 mg by mouth daily   Yes Historical Provider, MD   Cholecalciferol (VITAMIN D3) 1.25 MG (04186 UT) CAPS Take 1.25 capsules by mouth   Yes Historical Provider, MD   piperacillin-tazobactam (ZOSYN) 4.5 (4-0.5) g injection Inject 4.5 g into the muscle every 6 hours   Yes Historical Provider, MD   Continuous Blood Gluc Sensor ([facility]) [facility] Continuous Blood Gluc Sensor ([facility]) [facility] Continuous Blood Gluc Sensor ([facility]) [facility] Indications: Type 2 diabetes mellitus without complication, without long-term current use of insulin (HCC) Check FBS daily and PRN 1 each 0 07/22/2019 Active 07- Viru 65 (08721) 7/22/19  Yes Historical Provider, MD   fluticasone ihlda Garg) 50 MCG/ACT nasal spray instill 2 sprays into each nostril once daily 5/14/20  Yes IGOR Matias CNP   cyclobenzaprine (FLEXERIL) 10 MG tablet take 1 tablet by mouth three times a day for if needed for muscle spasm 5/14/20  Yes IGOR Matias CNP   fenofibrate (TRICOR) 54 MG tablet take 1 tablet by mouth once daily 5/14/20  Yes IGOR Matias CNP   aspirin (SM ASPIRIN ADULT LOW STRENGTH) 81 MG EC tablet take 1 tablet by mouth once daily 5/14/20  Yes IGOR Matias CNP   DULoxetine (CYMBALTA) 30 MG extended release capsule Take 1 capsule by mouth daily 5/14/20  Yes IGOR Matias CNP   lisinopril (PRINIVIL;ZESTRIL) 20 MG tablet take 1 tablet by mouth once daily 20  Yes Elli SARAH HodgeN - CNP   ibuprofen (ADVIL;MOTRIN) 600 MG tablet take 1 tablet by mouth four times a day if needed for pain 20  Yes Elli Ayesha, APRN - CNP   pregabalin (LYRICA) 300 MG capsule take 1 capsule by mouth twice a day 20  Nils SARAH HodgeN - CNP   pravastatin (PRAVACHOL) 20 MG tablet Take 1 tablet by mouth nightly 20   Elli Hodge APRN - CNP       OBJECTIVE:  /77   Pulse 130   Temp 97.6 °F (36.4 °C) (Temporal)   Resp 18   Ht 6' 1\" (1.854 m)   Wt 242 lb 2 oz (109.8 kg)   SpO2 97%   BMI 31.94 kg/m²   Temp  Av.3 °F (36.3 °C)  Min: 97 °F (36.1 °C)  Max: 97.6 °F (36.4 °C)  General appearance: Resting in bed in no apparent distress. Skin: Warm and dry. No rashes were noted. HEENT: Round and reactive pupils. Moist mucous membranes. No ulcerations or thrush. Neck: Supple to movements. Chest: No use of accessory muscles to breathe. Symmetrical expansion. No wheezing, crackles or rhonchi. Cardiovascular: Reguar rate and rhythm. No murmurs gallops, or rubs appreciated. Abdomen: Bowel sounds present, nontender, nondistended, no masses or hepatosplenomegaly. Extremities: No clubbing, no cyanosis, no edema. Lines: peripheral    I/O last 3 completed shifts:   In: 36 [P.O.:510; I.V.:50; IV Piggyback:200]  Out: 8066 [Urine:1900; Drains:475]      Laboratory and Tests Review:      Lab Results   Component Value Date    WBC 8.4 2020    WBC 10.2 2020    WBC 25.2 (H) 2020    HGB 10.0 (L) 2020    HCT 32.5 (L) 2020    MCV 88.6 2020     (H) 2020     Lab Results   Component Value Date    NEUTROABS 3.25 2020    NEUTROABS 6.11 2020    NEUTROABS 3.55 2020     No results found for: [facility]  Lab Results   Component Value Date    ALT 5 2020    AST 8 2020    ALKPHOS 127 2020    BILITOT 0.2 08/06/2020     Lab Results   Component Value Date     08/06/2020    K 4.5 08/06/2020    CL 98 08/06/2020    CO2 22 08/06/2020    BUN 10 08/06/2020    CREATININE 0.8 08/06/2020    CREATININE 0.8 08/04/2020    CREATININE 0.9 08/02/2020    GFRAA >60 08/06/2020    LABGLOM >60 08/06/2020    GLUCOSE 173 08/06/2020    GLUCOSE 125 05/11/2012    PROT 6.3 08/06/2020    LABALBU 3.0 08/06/2020    LABALBU 4.8 05/11/2012    CALCIUM 9.3 08/06/2020    BILITOT 0.2 08/06/2020    ALKPHOS 127 08/06/2020    AST 8 08/06/2020    ALT 5 08/06/2020     Lab Results   Component Value Date    CRP 3.9 (H) 08/01/2020    CRP 7.6 (H) 06/30/2019     Lab Results   Component Value Date    SEDRATE 28 (H) 06/30/2019       Radiology:    CTA CHEST W CONTRAST   Final Result      1. Positive for PE bilateral lower lobes. 2. New left basilar atelectasis. 3. No effusion. 4. Abnormal spleen, appearance consistent with infarction, with a   developing lateral subcapsular fluid collection. 5. Interval placement of a cholecystostomy pigtail drain. Contracted   gallbladder without obvious stones. 6. Report called to Dr. Chung Iniguez at approximately 1850 hours 8/6/2020. NOTE:  THIS IS AN ABNORMAL REPORT. XR CHEST PORTABLE   Final Result   Addendum 1 of 1   Addendum: In addition there is a left PICC line with tip probably in   the distal SVC, no pneumothorax and not changed. Final      CT PTC NEW ACCESS   Final Result   1. Uncomplicated image-guided percutaneous cholecystostomy. 2. Cholecystogram demonstrating cystic duct occlusion. CT HEAD WO CONTRAST   Final Result   No acute intracranial abnormality. XR CHEST PORTABLE   Final Result   Left-sided PICC line, as described. No acute cardiopulmonary disease process is identified. The study was dictated by Lupe López PA-C and Shaunna Flores MD   reviewed and concurred with the findings.       NM HEPATOBILIARY   Final Result   Abnormal hepatobiliary scan with evidence of acute cholecystitis. CT TUBE CATHETER FOLLOW UP    (Results Pending)       Microbiology:   Lab Results   Component Value Date    BC 5 Days no growth 07/31/2020    BC 5 Days- no growth 06/30/2019    ORG Corynebacterium species 08/01/2020    ORG Pseudomonas aeruginosa 08/01/2020    ORG Staphylococcus aureus 06/30/2019    ORG Pantoea species 06/30/2019     Lab Results   Component Value Date    BLOODCULT2 5 Days no growth 07/31/2020    BLOODCULT2 5 Days- no growth 06/30/2019    ORG Corynebacterium species 08/01/2020    ORG Pseudomonas aeruginosa 08/01/2020    ORG Staphylococcus aureus 06/30/2019    ORG Pantoea species 06/30/2019     WOUND/ABSCESS   Date Value Ref Range Status   08/01/2020 Heavy growth  Final   08/01/2020 Rare growth  Final   06/30/2019 (A)  Corrected    Mixed jf isolated. Further workup and sensitivity testing  is not routinely indicated and will not be performed. Mixed jf isolated includes:  Gram negative rods  Alpha hemolytic Strep species  Physician requested gram negative margaret workup     06/30/2019   Corrected    Heavy growth  Methicillin resistant Staph aureus isolated. Most Methicillin  resistant Staphylococcus are usually resistant to multiple  antibiotics including other B-Lactams, Aminoglycosides,  Macrolides, Clindamycin and Tetracycline. Contact isolation  is indicated.      06/30/2019 Light growth  Corrected     No results found for: RESPSMEAR  No results found for: MPNEUMO, CLAMYDCU, LABLEGI, AFBCX, FUNGSM, LABFUNG  No results found for: CULTRESP  No results found for: CXCATHTIP  Body Fluid Culture, Sterile   Date Value Ref Range Status   08/03/2020 Growth not present  Final     No results found for: CXSURG  Creatinine Ur POCT   Date Value Ref Range Status   10/21/2019 200mg/dL  Final   08/22/2018 300  Final     MRSA Culture Only   Date Value Ref Range Status   10/19/2016 Methicillin resistant Staph aureus not isolated  Final   10/11/2016 Heavy growth  Final   04/05/2016 Methicillin resistant Staph aureus not isolated  Final       Problem list:    Active Problems:    Essential hypertension    Class 1 obesity in adult    Type 2 diabetes mellitus (HCC)    Cellulitis of sacral region    Enterocolitis    Decubitus ulcer of sacral area    Abnormal findings on diagnostic imaging of gall bladder    Splenic infarction    Cyst of spleen    Splenic abscess    Anemia    Pulmonary embolism (HCC)  Resolved Problems:    * No resolved hospital problems. *      ASSESSMENT:    Acute cholecystitis. Status post CT-guided cholecystostomy tube placement 8/3/2020. Cultures negative because of previous antibiotic treatment   History of pulmonary embolism, treated at [address] [place] [address] fluid collection over the splenic bed. Abscess seems to be less likely   Leukocytosis associated to cholecystitis, rebounding   Stage IV sacral decubitus ulcer.  Colonized but not infected  WBC normal  PLAN:   Continue Zosyn until at least 8/15/2020   No need to treat organisms in the decubitus ulcer  Labs reviewed  All notes noted       CHRISTIAN Cifuentes DO    9:10 AM  8/8/2020

## 2020-08-08 NOTE — PROGRESS NOTES
Pulmonary [address] [facility]                             Pulmonary Consult/Progress Note :    Chief complaint: transfer after RRT- hypotensive and tachycardic   History of Present Illness   He states SOB and weakness better  Usually walk with assistance   No events   Had RUQ /gallbladder drain by IR   Has pressure ulcer on Coccyx     Physical Exam   · Vitals: /77   Pulse 119   Temp 97.3 °F (36.3 °C) (Temporal)   Resp 16   Ht 6' 1\" (1.854 m)   Wt 247 lb 6.4 oz (112.2 kg)   SpO2 95%   BMI 32.64 kg/m²           · General Appearance: alert and oriented to person, place and time, well developed and well- nourished, in no acute distress. Pleasant 53year old male. · Head: normocephalic and atraumatic  · Eyes: pupils equal, round, and reactive to light, extraocular eye movements intact, conjunctivae normal  · ENT: tympanic membrane, external ear and ear canal normal bilaterally, nose without deformity, nasal mucosa and turbinates normal without polyps  · Neck: supple and non-tender without mass, no thyromegaly or thyroid nodules, no cervical lymphadenopathy  · Pulmonary/Chest: clear to auscultation bilaterally- no wheezes, rales or rhonchi, normal air movement, no respiratory distress  · Cardiovascular: tachycardic rate, regular rhythm, normal S1 and S2, no murmurs, rubs, clicks, or gallops, distal pulses intact, no carotid bruits  · Abdomen: soft, tender to palpation, non-distended, normal bowel sounds, no masses or organomegaly  · Extremities: no cyanosis, clubbing or edema  · Musculoskeletal: normal range of motion, no joint swelling, deformity or tenderness  · Neurologic: AAOx3; patient R hand weak and loss of movement compared to left side.      Basic Labs  CBC:   Lab Results   Component Value Date    WBC 10.2 08/07/2020    RBC 3.62 08/07/2020    HGB 9.8 08/07/2020    HCT 31.8 08/07/2020    MCV 87.8 08/07/2020    RDW 14.8 08/07/2020     08/07/2020 CMP:  Lab Results   Component Value Date     08/06/2020    K 4.5 08/06/2020    CL 98 08/06/2020    CO2 22 08/06/2020    BUN 10 08/06/2020    PROT 6.3 08/06/2020         Assessment and Plan       Sepsis/shock, Likely 2/2 acute cholecystis   ID following ,on Zosyn    IR gallbladder drainage   wean pressors       Hx of PE 6/2020  - on therapeutic lovenox--> hematology eval   -Recent splenic infarct   - continue to monitor  On RA     Sacral decubitus ulcer, stage III, does not look infected, with good granulation tissue   - packed in MICU and dressing applied    - Continue to keep clean, dry, intact  - wound care consult/following    Will see as needed       Joshua Bentley MD,FCCP  Pulmonary&Critical Care Medicine   Director of [address] [place] [address] Director of [address] [place] [address]    Anastasia Rinaldi

## 2020-08-09 LAB
ANISOCYTOSIS: ABNORMAL
BASOPHILS ABSOLUTE: 0 E9/L (ref 0–0.2)
BASOPHILS RELATIVE PERCENT: 0.7 % (ref 0–2)
BURR CELLS: ABNORMAL
EOSINOPHILS ABSOLUTE: 1.45 E9/L (ref 0.05–0.5)
EOSINOPHILS RELATIVE PERCENT: 8.7 % (ref 0–6)
HCT VFR BLD CALC: 32.4 % (ref 37–54)
HEMOGLOBIN: 10.1 G/DL (ref 12.5–16.5)
LYMPHOCYTES ABSOLUTE: 2.67 E9/L (ref 1.5–4)
LYMPHOCYTES RELATIVE PERCENT: 15.7 % (ref 20–42)
MCH RBC QN AUTO: 27.3 PG (ref 26–35)
MCHC RBC AUTO-ENTMCNC: 31.2 % (ref 32–34.5)
MCV RBC AUTO: 87.6 FL (ref 80–99.9)
METER GLUCOSE: 115 MG/DL (ref 74–99)
METER GLUCOSE: 144 MG/DL (ref 74–99)
METER GLUCOSE: 159 MG/DL (ref 74–99)
METER GLUCOSE: 169 MG/DL (ref 74–99)
MONOCYTES ABSOLUTE: 1.17 E9/L (ref 0.1–0.95)
MONOCYTES RELATIVE PERCENT: 7 % (ref 2–12)
NEUTROPHILS ABSOLUTE: 11.52 E9/L (ref 1.8–7.3)
NEUTROPHILS RELATIVE PERCENT: 68.7 % (ref 43–80)
PDW BLD-RTO: 14.6 FL (ref 11.5–15)
PLATELET # BLD: 914 E9/L (ref 130–450)
PMV BLD AUTO: 8.9 FL (ref 7–12)
POIKILOCYTES: ABNORMAL
POLYCHROMASIA: ABNORMAL
RBC # BLD: 3.7 E12/L (ref 3.8–5.8)
WBC # BLD: 16.7 E9/L (ref 4.5–11.5)

## 2020-08-09 PROCEDURE — 6370000000 HC RX 637 (ALT 250 FOR IP): Performed by: INTERNAL MEDICINE

## 2020-08-09 PROCEDURE — 6360000002 HC RX W HCPCS: Performed by: INTERNAL MEDICINE

## 2020-08-09 PROCEDURE — 6360000002 HC RX W HCPCS: Performed by: SPECIALIST

## 2020-08-09 PROCEDURE — 2580000003 HC RX 258: Performed by: SPECIALIST

## 2020-08-09 PROCEDURE — 2060000000 HC ICU INTERMEDIATE R&B

## 2020-08-09 PROCEDURE — 82962 GLUCOSE BLOOD TEST: CPT

## 2020-08-09 PROCEDURE — 2580000003 HC RX 258: Performed by: INTERNAL MEDICINE

## 2020-08-09 PROCEDURE — 85025 COMPLETE CBC W/AUTO DIFF WBC: CPT

## 2020-08-09 PROCEDURE — 36415 COLL VENOUS BLD VENIPUNCTURE: CPT

## 2020-08-09 RX ADMIN — PRAVASTATIN SODIUM 20 MG: 20 TABLET ORAL at 20:06

## 2020-08-09 RX ADMIN — ENOXAPARIN SODIUM 120 MG: 120 INJECTION SUBCUTANEOUS at 20:06

## 2020-08-09 RX ADMIN — FLUTICASONE PROPIONATE 1 SPRAY: 50 SPRAY, METERED NASAL at 08:39

## 2020-08-09 RX ADMIN — PIPERACILLIN AND TAZOBACTAM 3.38 G: 3; .375 INJECTION, POWDER, FOR SOLUTION INTRAVENOUS at 17:45

## 2020-08-09 RX ADMIN — INSULIN LISPRO 2 UNITS: 100 INJECTION, SOLUTION INTRAVENOUS; SUBCUTANEOUS at 16:05

## 2020-08-09 RX ADMIN — FENOFIBRATE 54 MG: 54 TABLET ORAL at 08:38

## 2020-08-09 RX ADMIN — SODIUM CHLORIDE: 9 INJECTION, SOLUTION INTRAVENOUS at 20:06

## 2020-08-09 RX ADMIN — POTASSIUM CHLORIDE 20 MEQ: 1500 TABLET, EXTENDED RELEASE ORAL at 08:38

## 2020-08-09 RX ADMIN — MORPHINE SULFATE 2 MG: 2 INJECTION, SOLUTION INTRAMUSCULAR; INTRAVENOUS at 21:54

## 2020-08-09 RX ADMIN — OXYCODONE AND ACETAMINOPHEN 1 TABLET: 5; 325 TABLET ORAL at 04:14

## 2020-08-09 RX ADMIN — OXYCODONE AND ACETAMINOPHEN 1 TABLET: 5; 325 TABLET ORAL at 20:05

## 2020-08-09 RX ADMIN — PIPERACILLIN AND TAZOBACTAM 3.38 G: 3; .375 INJECTION, POWDER, FOR SOLUTION INTRAVENOUS at 08:39

## 2020-08-09 RX ADMIN — PREGABALIN 300 MG: 100 CAPSULE ORAL at 08:38

## 2020-08-09 RX ADMIN — MORPHINE SULFATE 2 MG: 2 INJECTION, SOLUTION INTRAMUSCULAR; INTRAVENOUS at 11:13

## 2020-08-09 RX ADMIN — Medication 10 ML: at 20:06

## 2020-08-09 RX ADMIN — Medication 10 ML: at 08:51

## 2020-08-09 RX ADMIN — INSULIN GLARGINE 10 UNITS: 100 INJECTION, SOLUTION SUBCUTANEOUS at 20:11

## 2020-08-09 RX ADMIN — PIPERACILLIN AND TAZOBACTAM 3.38 G: 3; .375 INJECTION, POWDER, FOR SOLUTION INTRAVENOUS at 01:15

## 2020-08-09 RX ADMIN — ENOXAPARIN SODIUM 120 MG: 120 INJECTION SUBCUTANEOUS at 08:39

## 2020-08-09 RX ADMIN — OXYCODONE AND ACETAMINOPHEN 1 TABLET: 5; 325 TABLET ORAL at 13:00

## 2020-08-09 RX ADMIN — MORPHINE SULFATE 2 MG: 2 INJECTION, SOLUTION INTRAMUSCULAR; INTRAVENOUS at 15:24

## 2020-08-09 RX ADMIN — INSULIN LISPRO 2 UNITS: 100 INJECTION, SOLUTION INTRAVENOUS; SUBCUTANEOUS at 11:21

## 2020-08-09 RX ADMIN — MORPHINE SULFATE 2 MG: 2 INJECTION, SOLUTION INTRAMUSCULAR; INTRAVENOUS at 06:14

## 2020-08-09 RX ADMIN — PREGABALIN 300 MG: 100 CAPSULE ORAL at 20:05

## 2020-08-09 RX ADMIN — DULOXETINE HYDROCHLORIDE 30 MG: 30 CAPSULE, DELAYED RELEASE ORAL at 08:38

## 2020-08-09 RX ADMIN — PANTOPRAZOLE SODIUM 40 MG: 40 TABLET, DELAYED RELEASE ORAL at 06:14

## 2020-08-09 ASSESSMENT — PAIN DESCRIPTION - LOCATION
LOCATION: WRIST
LOCATION: BACK
LOCATION: BACK
LOCATION: WRIST
LOCATION: BACK

## 2020-08-09 ASSESSMENT — PAIN DESCRIPTION - PAIN TYPE
TYPE: CHRONIC PAIN

## 2020-08-09 ASSESSMENT — PAIN DESCRIPTION - ORIENTATION
ORIENTATION: LOWER

## 2020-08-09 ASSESSMENT — PAIN - FUNCTIONAL ASSESSMENT
PAIN_FUNCTIONAL_ASSESSMENT: PREVENTS OR INTERFERES WITH MANY ACTIVE NOT PASSIVE ACTIVITIES

## 2020-08-09 ASSESSMENT — PAIN DESCRIPTION - ONSET
ONSET: ON-GOING

## 2020-08-09 ASSESSMENT — PAIN DESCRIPTION - PROGRESSION
CLINICAL_PROGRESSION: GRADUALLY WORSENING
CLINICAL_PROGRESSION: GRADUALLY IMPROVING

## 2020-08-09 ASSESSMENT — PAIN SCALES - GENERAL
PAINLEVEL_OUTOF10: 7
PAINLEVEL_OUTOF10: 8
PAINLEVEL_OUTOF10: 10
PAINLEVEL_OUTOF10: 8
PAINLEVEL_OUTOF10: 9
PAINLEVEL_OUTOF10: 7
PAINLEVEL_OUTOF10: 10
PAINLEVEL_OUTOF10: 6

## 2020-08-09 ASSESSMENT — PAIN DESCRIPTION - DESCRIPTORS
DESCRIPTORS: ACHING
DESCRIPTORS: ACHING
DESCRIPTORS: ACHING;DISCOMFORT
DESCRIPTORS: ACHING;DISCOMFORT
DESCRIPTORS: ACHING

## 2020-08-09 ASSESSMENT — PAIN DESCRIPTION - FREQUENCY
FREQUENCY: CONTINUOUS

## 2020-08-09 NOTE — PROGRESS NOTES
Hospitalist Progress Note      PCP: Michela Chang, IGOR - CNP    Date of Admission: 7/31/2020    Chief Complaint: **Abd pain, and scaral decubiti      Hospital Course: ** apparently had been in a [facility] for rehab due to frankie in a COMA,  He has a sacral decubiti, it was thought to be infected. He has a wound vac, and a splenic abscess and PE with splenic v thrombosis. His platelets were extremely elevated. Seen by hematology, on full dose of lovenox. He was transferred to MICU and had an IR cholecystostomy tube.        Subjective:   No acute events overnight; reports continued intermittent pain, but pain regimen working well for him  States that he is too weak to get out of bed by himself, working with therapists   Has had infrequent diarrhea     Medications:  Reviewed    Infusion Medications    dextrose       Scheduled Medications    piperacillin-tazobactam  3.375 g Intravenous Q8H    And    sodium chloride   Intravenous Q8H    enoxaparin  1 mg/kg Subcutaneous BID    insulin glargine  10 Units Subcutaneous Nightly    insulin lispro  0-12 Units Subcutaneous TID WC    sodium chloride flush  10 mL Intravenous 2 times per day    DULoxetine  30 mg Oral Daily    fenofibrate  54 mg Oral Daily    fluticasone  1 spray Each Nostril Daily    [Held by provider] insulin lispro  5 Units Subcutaneous TID WC    [Held by provider] lisinopril  20 mg Oral Daily    pantoprazole  40 mg Oral QAM AC    pravastatin  20 mg Oral Nightly    pregabalin  300 mg Oral BID    potassium chloride  20 mEq Oral Daily     PRN Meds: iopamidol, sodium chloride flush, heparin flush, cyclobenzaprine, melatonin, glucose, dextrose, glucagon (rDNA), dextrose, acetaminophen **OR** acetaminophen, polyethylene glycol, promethazine **OR** ondansetron, morphine, oxyCODONE-acetaminophen      Intake/Output Summary (Last 24 hours) at 8/9/2020 1146  Last data filed at 8/9/2020 0851  Gross per 24 hour   Intake 1400 ml   Output 2930 ml   Net -1530 ml       Exam:    /75   Pulse 143   Temp 96.6 °F (35.9 °C) (Temporal)   Resp 18   Ht 6' 1\" (1.854 m)   Wt 239 lb 4.8 oz (108.5 kg)   SpO2 100%   BMI 31.57 kg/m²       Gen: *well developed  HEENT: NC/AT, moist mucous membranes, no oropharyngeal erythema or exudate  Neck: supple, trachea midline, no anterior cervical or SC LAD  Heart:  Normal s1/s2, RRR, no murmurs, gallops, or rubs. Lungs:  *cta * bilaterally,   Abd: bowel sounds present, soft, pos tender, nondistended, no masses right cholecystostomy tube. Draining yellow drainage. Extrem:  No clubbing, cyanosis,  *pos* edema  Skin: no rashes or lesions  Psych: asleep}  Neuro: CN 2-12  grossly intact,   Capillary Refill: Brisk,< 3 seconds   Peripheral Pulses: +2 palpable, equal bilaterally              Labs:   Recent Labs     08/07/20  0954 08/08/20  0557 08/09/20  0509   WBC 10.2 8.4 16.7*   HGB 9.8* 10.0* 10.1*   HCT 31.8* 32.5* 32.4*   * 952* 914*     No results for input(s): NA, K, CL, CO2, BUN, CREATININE, CALCIUM, PHOS in the last 72 hours. Invalid input(s): MAGNES  No results for input(s): AST, ALT, BILIDIR, BILITOT, ALKPHOS in the last 72 hours. No results for input(s): INR in the last 72 hours. No results for input(s): Amaryllis Goodell in the last 72 hours. No results for input(s): AST, ALT, ALB, BILIDIR, BILITOT, ALKPHOS in the last 72 hours. No results for input(s): LACTA in the last 72 hours.   No results found for: Rachel Bishop Results   Component Value Date    AMMONIA 22.0 08/02/2020       Assessment:    Active Hospital Problems    Diagnosis Date Noted    Decubitus ulcer of sacral area [L89.159] 08/01/2020    Abnormal findings on diagnostic imaging of gall bladder [R93.2] 08/01/2020    Splenic infarction [D73.5] 08/01/2020    Cyst of spleen [D73.4] 08/01/2020    Splenic abscess [D73.3] 08/01/2020    Anemia [D64.9] 08/01/2020    Pulmonary embolism ([facility] [I26.99] 08/01/2020    Enterocolitis [K52.9] 07/31/2020    Cellulitis of sacral region [L03.319] 07/02/2019    Type 2 diabetes mellitus ([facility] [E11.9] 04/08/2016    Class 1 obesity in adult [E66.9] 12/17/2015    Essential hypertension [I10] 10/16/2015   R cholecystostomy tube. Plan:  1. Pulmonology following  2. Critical care following  3. General surgery following  4.  Infectious disease following - leukocytosis worsening, so pt not yet stable to discharge  5. Hematology following given recent splenic infarct; pt on therapeutic lovenox at this time   6. Glycemic control   7. Continue Zosyn  8. Continue Cymbalta, fenofibrate, metoprolol, pravastatin, Lyrica  9. Gait team, PT/OT   10.  Wound care for sacral decubitus ulcer     If CBC improved in am pt may potentially dc tomorrow on IV zosyn, plan is to go back to his facility     DVT Prophylaxis: *heparin  Diet: Diet NPO Effective Now Exceptions are: Sips with Meds  Code Status: Full Code     PT/OT Eval Status:  When stable           Electronically signed by Alex Bansal MD on 8/9/2020 at 11:46 AM

## 2020-08-09 NOTE — PLAN OF CARE
Problem: Pain:  Goal: Pain level will decrease  Description: Pain level will decrease  8/8/2020 1706 by Cristi Gallegos RN  Outcome: Met This Shift  8/8/2020 1108 by Anup Rachel RN  Outcome: Met This Shift  Goal: Control of acute pain  Description: Control of acute pain  8/8/2020 1706 by Cristi Gallegos RN  Outcome: Met This Shift  Goal: Control of chronic pain  Description: Control of chronic pain  8/8/2020 1706 by Cristi Gallegos RN  Outcome: Met This Shift     Problem: Falls - Risk of:  Goal: Will remain free from falls  Description: Will remain free from falls  8/9/2020 0023 by Opal Martinez  Outcome: Met This Shift  8/8/2020 1706 by Cristi Gallegos RN  Outcome: Met This Shift  8/8/2020 1108 by Anup Rachel RN  Outcome: Met This Shift  Goal: Absence of physical injury  Description: Absence of physical injury  8/9/2020 0023 by Opal Martinez  Outcome: Met This Shift  8/8/2020 1706 by Cristi Gallegos RN  Outcome: Met This Shift     Problem: Skin Integrity:  Goal: Will show no infection signs and symptoms  Description: Will show no infection signs and symptoms  8/9/2020 0023 by Opal Martinez  Outcome: Met This Shift  8/8/2020 1108 by Anup Rachel RN  Outcome: Met This Shift

## 2020-08-09 NOTE — PROGRESS NOTES
[address] [place] [address] [facility]  Progress Note      Chief Complaint   Patient presents with    Abnormal CT     sent from [place] with abnormal CT done today, only c/o are pain from pressure ulcers on coccyx       SUBJECTIVE:      Patient is tolerating medications. No reported adverse drug reactions. No problems overnight. Has loose stools 1-2 times per day    Review of systems:    As stated above in the chief complaint, otherwise negative. Medications:    Scheduled Meds:   piperacillin-tazobactam  3.375 g Intravenous Q8H    And    sodium chloride   Intravenous Q8H    enoxaparin  1 mg/kg Subcutaneous BID    insulin glargine  10 Units Subcutaneous Nightly    insulin lispro  0-12 Units Subcutaneous TID WC    sodium chloride flush  10 mL Intravenous 2 times per day    DULoxetine  30 mg Oral Daily    fenofibrate  54 mg Oral Daily    fluticasone  1 spray Each Nostril Daily    [Held by provider] insulin lispro  5 Units Subcutaneous TID WC    [Held by provider] lisinopril  20 mg Oral Daily    pantoprazole  40 mg Oral QAM AC    pravastatin  20 mg Oral Nightly    pregabalin  300 mg Oral BID    potassium chloride  20 mEq Oral Daily     Continuous Infusions:   dextrose       PRN Meds:iopamidol, sodium chloride flush, heparin flush, cyclobenzaprine, melatonin, glucose, dextrose, glucagon (rDNA), dextrose, acetaminophen **OR** acetaminophen, polyethylene glycol, promethazine **OR** ondansetron, morphine, oxyCODONE-acetaminophen  Prior to Admission medications    Medication Sig Start Date End Date Taking?  Authorizing Provider   enoxaparin (LOVENOX) 120 MG/0.8ML injection Inject 1 mg/kg into the skin every 12 hours   Yes Historical Provider, MD   insulin lispro (HUMALOG) 100 UNIT/ML injection cartridge Inject 5 Units into the skin 3 times daily (before meals)   Yes Historical Provider, MD   insulin glargine (LANTUS) 100 UNIT/ML injection vial Inject 20 Units into the skin nightly   Yes Historical Provider, MD   loperamide (LOPERAMIDE A-D) 2 MG tablet Take 2 mg by mouth 4 times daily as needed for Diarrhea   Yes Historical Provider, MD   Melatonin-Pyridoxine (MELATIN) 3-1 MG TABS Take 3 mg by mouth   Yes Historical Provider, MD   nystatin (MYCOSTATIN) 389836 UNIT/GM cream Apply topically 2 times daily Apply topically 2 times daily. Yes Historical Provider, MD   oxyCODONE-acetaminophen (PERCOCET) 5-325 MG per tablet Take 1 tablet by mouth every 6 hours as needed for Pain.    Yes Historical Provider, MD   pantoprazole (PROTONIX) 40 MG tablet Take 40 mg by mouth daily   Yes Historical Provider, MD   Cholecalciferol (VITAMIN D3) 1.25 MG (75514 UT) CAPS Take 1.25 capsules by mouth   Yes Historical Provider, MD   piperacillin-tazobactam (ZOSYN) 4.5 (4-0.5) g injection Inject 4.5 g into the muscle every 6 hours   Yes Historical Provider, MD   Continuous Blood Gluc Sensor ([address]) [facility] Continuous Blood Gluc Sensor ([address] [facility]) [facility] Continuous Blood Gluc Sensor ([address] [facility]) [facility] Indications: Type 2 diabetes mellitus without complication, without long-term current use of insulin (HCC) Check FBS daily and PRN 1 each 0 07/22/2019 Active 07- Viru 65 (25043) 7/22/19  Yes Historical Provider, MD   fluticasone Classyevgeniy Howard) 50 MCG/ACT nasal spray instill 2 sprays into each nostril once daily 5/14/20  Yes Boris Cooks, APRN - CNP   cyclobenzaprine (FLEXERIL) 10 MG tablet take 1 tablet by mouth three times a day for if needed for muscle spasm 5/14/20  Yes Boris Cooks, APRN - CNP   fenofibrate (TRICOR) 54 MG tablet take 1 tablet by mouth once daily 5/14/20  Yes Boris Cooks, APRN - CNP   aspirin (SM ASPIRIN ADULT LOW STRENGTH) 81 MG EC tablet take 1 tablet by mouth once daily 5/14/20  Yes Boris Cooks, APRN - CNP   DULoxetine (CYMBALTA) 30 MG extended release capsule Take 1 capsule by mouth daily 5/14/20  Yes IGOR Castro CNP   lisinopril (PRINIVIL;ZESTRIL) 20 MG tablet take 1 tablet by mouth once daily 20  Yes IGOR Castro CNP   ibuprofen (ADVIL;MOTRIN) 600 MG tablet take 1 tablet by mouth four times a day if needed for pain 20  Yes IGOR Castro CNP   pregabalin (LYRICA) 300 MG capsule take 1 capsule by mouth twice a day 20  IGOR Castro CNP   pravastatin (PRAVACHOL) 20 MG tablet Take 1 tablet by mouth nightly 20   IGOR Castro CNP       OBJECTIVE:  /75   Pulse 143   Temp 96.6 °F (35.9 °C) (Temporal)   Resp 18   Ht 6' 1\" (1.854 m)   Wt 239 lb 4.8 oz (108.5 kg)   SpO2 100%   BMI 31.57 kg/m²   Temp  Av.2 °F (36.2 °C)  Min: 96.6 °F (35.9 °C)  Max: 97.7 °F (36.5 °C)  General appearance: Resting in bed in no apparent distress. Skin: Warm and dry. No rashes were noted. HEENT: Round and reactive pupils. Moist mucous membranes. No ulcerations or thrush. Neck: Supple to movements. Chest: No use of accessory muscles to breathe. Symmetrical expansion. No wheezing, crackles or rhonchi. Cardiovascular: Reguar rate and rhythm. No murmurs gallops, or rubs appreciated. Abdomen: Bowel sounds present, nontender, nondistended, no masses or hepatosplenomegaly. reji drain present  Extremities: No clubbing, no cyanosis, no edema. Lines: peripheral    I/O last 3 completed shifts:   In: 46 [P.O.:1480; I.V.:100; IV Piggyback:100]  Out: 2160 [Urine:1600; Drains:560]      Laboratory and Tests Review:      Lab Results   Component Value Date    WBC 16.7 (H) 2020    WBC 8.4 2020    WBC 10.2 2020    HGB 10.1 (L) 2020    HCT 32.4 (L) 2020    MCV 87.6 2020     (H) 2020     Lab Results   Component Value Date    NEUTROABS 11.52 (H) 2020    NEUTROABS 3.25 2020    NEUTROABS 6.11 2020     No results found for: [facility]  Lab Results   Component Value Date    ALT 5 2020    AST 8 08/06/2020    ALKPHOS 127 08/06/2020    BILITOT 0.2 08/06/2020     Lab Results   Component Value Date     08/06/2020    K 4.5 08/06/2020    CL 98 08/06/2020    CO2 22 08/06/2020    BUN 10 08/06/2020    CREATININE 0.8 08/06/2020    CREATININE 0.8 08/04/2020    CREATININE 0.9 08/02/2020    GFRAA >60 08/06/2020    LABGLOM >60 08/06/2020    GLUCOSE 173 08/06/2020    GLUCOSE 125 05/11/2012    PROT 6.3 08/06/2020    LABALBU 3.0 08/06/2020    LABALBU 4.8 05/11/2012    CALCIUM 9.3 08/06/2020    BILITOT 0.2 08/06/2020    ALKPHOS 127 08/06/2020    AST 8 08/06/2020    ALT 5 08/06/2020     Lab Results   Component Value Date    CRP 3.9 (H) 08/01/2020    CRP 7.6 (H) 06/30/2019     Lab Results   Component Value Date    SEDRATE 28 (H) 06/30/2019       Radiology:    CTA CHEST W CONTRAST   Final Result      1. Positive for PE bilateral lower lobes. 2. New left basilar atelectasis. 3. No effusion. 4. Abnormal spleen, appearance consistent with infarction, with a   developing lateral subcapsular fluid collection. 5. Interval placement of a cholecystostomy pigtail drain. Contracted   gallbladder without obvious stones. 6. Report called to Dr. Jelly Adams at approximately 1850 hours 8/6/2020. NOTE:  THIS IS AN ABNORMAL REPORT. XR CHEST PORTABLE   Final Result   Addendum 1 of 1   Addendum: In addition there is a left PICC line with tip probably in   the distal SVC, no pneumothorax and not changed. Final      CT PTC NEW ACCESS   Final Result   1. Uncomplicated image-guided percutaneous cholecystostomy. 2. Cholecystogram demonstrating cystic duct occlusion. CT HEAD WO CONTRAST   Final Result   No acute intracranial abnormality. XR CHEST PORTABLE   Final Result   Left-sided PICC line, as described. No acute cardiopulmonary disease process is identified.       The study was dictated by Bonnie Knox PA-C and True Goldberg MD   reviewed and concurred with the findings. NM HEPATOBILIARY   Final Result   Abnormal hepatobiliary scan with evidence of acute cholecystitis. CT TUBE CATHETER FOLLOW UP    (Results Pending)       Microbiology:   Lab Results   Component Value Date    BC 5 Days no growth 07/31/2020    BC 5 Days- no growth 06/30/2019    ORG Corynebacterium species 08/01/2020    ORG Pseudomonas aeruginosa 08/01/2020    ORG Staphylococcus aureus 06/30/2019    ORG Pantoea species 06/30/2019     Lab Results   Component Value Date    BLOODCULT2 5 Days no growth 07/31/2020    BLOODCULT2 5 Days- no growth 06/30/2019    ORG Corynebacterium species 08/01/2020    ORG Pseudomonas aeruginosa 08/01/2020    ORG Staphylococcus aureus 06/30/2019    ORG Pantoea species 06/30/2019     WOUND/ABSCESS   Date Value Ref Range Status   08/01/2020 Heavy growth  Final   08/01/2020 Rare growth  Final   06/30/2019 (A)  Corrected    Mixed jf isolated. Further workup and sensitivity testing  is not routinely indicated and will not be performed. Mixed jf isolated includes:  Gram negative rods  Alpha hemolytic Strep species  Physician requested gram negative margaret workup     06/30/2019   Corrected    Heavy growth  Methicillin resistant Staph aureus isolated. Most Methicillin  resistant Staphylococcus are usually resistant to multiple  antibiotics including other B-Lactams, Aminoglycosides,  Macrolides, Clindamycin and Tetracycline. Contact isolation  is indicated.      06/30/2019 Light growth  Corrected     No results found for: RESPSMEAR  No results found for: MPNEUMO, CLAMYDCU, LABLEGI, AFBCX, FUNGSM, LABFUNG  No results found for: CULTRESP  No results found for: CXCATHTIP  Body Fluid Culture, Sterile   Date Value Ref Range Status   08/03/2020 Growth not present  Final     No results found for: CXSURG  Creatinine Ur POCT   Date Value Ref Range Status   10/21/2019 200mg/dL  Final   08/22/2018 300  Final     MRSA Culture Only   Date Value Ref Range Status   10/19/2016 Methicillin resistant Staph aureus not isolated  Final   10/11/2016 Heavy growth  Final   04/05/2016 Methicillin resistant Staph aureus not isolated  Final       Problem list:    Active Problems:    Essential hypertension    Class 1 obesity in adult    Type 2 diabetes mellitus (HCC)    Cellulitis of sacral region    Enterocolitis    Decubitus ulcer of sacral area    Abnormal findings on diagnostic imaging of gall bladder    Splenic infarction    Cyst of spleen    Splenic abscess    Anemia    Pulmonary embolism ([address]  Resolved Problems:    * No resolved hospital problems. *      ASSESSMENT:    Acute cholecystitis. Status post CT-guided cholecystostomy tube placement 8/3/2020. Cultures negative because of previous antibiotic treatment   History of pulmonary embolism, treated at [address] [place] [address] fluid collection over the splenic bed. Abscess seems to be less likely   Leukocytosis associated to cholecystitis, rebounding   Stage IV sacral decubitus ulcer. Colonized but not infected  WBC elevated today 16  PLAN:   Continue Zosyn until at least 8/15/2020   No need to treat organisms in the decubitus ulcer  Labs reviewed  All notes noted       Manju Curtis  CNP  10:07 AM  8/9/2020   Pt seen and examined. Above discussed agree with advanced practice nurse. Labs, cultures, and radiographs reviewed. Face to Face encounter occurred. Changes made as necessary.      Dangelo Dacosta MD

## 2020-08-09 NOTE — PLAN OF CARE
Problem: Falls - Risk of:  Goal: Will remain free from falls  Description: Will remain free from falls  8/9/2020 1244 by Nicolette Severino RN  Outcome: Met This Shift  8/9/2020 0023 by Rhianna Gay  Outcome: Met This Shift     Problem: Falls - Risk of:  Goal: Absence of physical injury  Description: Absence of physical injury  8/9/2020 0023 by Rhianna Gay  Outcome: Met This Shift     Problem: Skin Integrity:  Goal: Will show no infection signs and symptoms  Description: Will show no infection signs and symptoms  8/9/2020 1244 by Nicolette Severino RN  Outcome: Met This Shift  8/9/2020 0023 by Rhianna Gay  Outcome: Met This Shift     Problem: Skin Integrity:  Goal: Absence of new skin breakdown  Description: Absence of new skin breakdown  Outcome: Met This Shift

## 2020-08-10 PROBLEM — D75.839 THROMBOCYTOSIS: Status: ACTIVE | Noted: 2020-08-10

## 2020-08-10 LAB
ANISOCYTOSIS: ABNORMAL
BASOPHILS ABSOLUTE: 0 E9/L (ref 0–0.2)
BASOPHILS RELATIVE PERCENT: 0.8 % (ref 0–2)
CMV DNA QNT PCR: <2.6 LOG CPY/ML
CMV DNA QUANTATATIVE INTERPRETATION: <2.4 LOG IU/ML
CMV DNA QUANTATATIVE INTERPRETATION: NOT DETECTED
CMV DNA QUANTITATIVE: <227 IU/ML
CMV SOURCE: NORMAL
CMVQ COPY/ML: <390 CPY/ML
EOSINOPHILS ABSOLUTE: 0.37 E9/L (ref 0.05–0.5)
EOSINOPHILS RELATIVE PERCENT: 2.6 % (ref 0–6)
HCT VFR BLD CALC: 32.5 % (ref 37–54)
HEMOGLOBIN: 10.2 G/DL (ref 12.5–16.5)
HYPOCHROMIA: ABNORMAL
LYMPHOCYTES ABSOLUTE: 1.56 E9/L (ref 1.5–4)
LYMPHOCYTES RELATIVE PERCENT: 10.5 % (ref 20–42)
MCH RBC QN AUTO: 27.3 PG (ref 26–35)
MCHC RBC AUTO-ENTMCNC: 31.4 % (ref 32–34.5)
MCV RBC AUTO: 87.1 FL (ref 80–99.9)
METER GLUCOSE: 126 MG/DL (ref 74–99)
METER GLUCOSE: 134 MG/DL (ref 74–99)
METER GLUCOSE: 144 MG/DL (ref 74–99)
METER GLUCOSE: 180 MG/DL (ref 74–99)
MONOCYTES ABSOLUTE: 0.57 E9/L (ref 0.1–0.95)
MONOCYTES RELATIVE PERCENT: 4.4 % (ref 2–12)
NEUTROPHILS ABSOLUTE: 11.79 E9/L (ref 1.8–7.3)
NEUTROPHILS RELATIVE PERCENT: 82.5 % (ref 43–80)
OVALOCYTES: ABNORMAL
PDW BLD-RTO: 14.9 FL (ref 11.5–15)
PLATELET # BLD: 1100 E9/L (ref 130–450)
PMV BLD AUTO: 8.7 FL (ref 7–12)
POIKILOCYTES: ABNORMAL
POLYCHROMASIA: ABNORMAL
RBC # BLD: 3.73 E12/L (ref 3.8–5.8)
SCHISTOCYTES: ABNORMAL
TARGET CELLS: ABNORMAL
WBC # BLD: 14.2 E9/L (ref 4.5–11.5)

## 2020-08-10 PROCEDURE — 85025 COMPLETE CBC W/AUTO DIFF WBC: CPT

## 2020-08-10 PROCEDURE — 6370000000 HC RX 637 (ALT 250 FOR IP): Performed by: INTERNAL MEDICINE

## 2020-08-10 PROCEDURE — 2580000003 HC RX 258: Performed by: SPECIALIST

## 2020-08-10 PROCEDURE — 6360000002 HC RX W HCPCS: Performed by: INTERNAL MEDICINE

## 2020-08-10 PROCEDURE — 2060000000 HC ICU INTERMEDIATE R&B

## 2020-08-10 PROCEDURE — 6360000002 HC RX W HCPCS: Performed by: SPECIALIST

## 2020-08-10 PROCEDURE — 6370000000 HC RX 637 (ALT 250 FOR IP): Performed by: CLINICAL NURSE SPECIALIST

## 2020-08-10 PROCEDURE — 2580000003 HC RX 258: Performed by: INTERNAL MEDICINE

## 2020-08-10 PROCEDURE — 36415 COLL VENOUS BLD VENIPUNCTURE: CPT

## 2020-08-10 PROCEDURE — 82962 GLUCOSE BLOOD TEST: CPT

## 2020-08-10 RX ADMIN — PIPERACILLIN AND TAZOBACTAM 3.38 G: 3; .375 INJECTION, POWDER, FOR SOLUTION INTRAVENOUS at 09:11

## 2020-08-10 RX ADMIN — OXYCODONE AND ACETAMINOPHEN 1 TABLET: 5; 325 TABLET ORAL at 11:35

## 2020-08-10 RX ADMIN — INSULIN GLARGINE 10 UNITS: 100 INJECTION, SOLUTION SUBCUTANEOUS at 22:09

## 2020-08-10 RX ADMIN — FLUTICASONE PROPIONATE 1 SPRAY: 50 SPRAY, METERED NASAL at 09:11

## 2020-08-10 RX ADMIN — SODIUM CHLORIDE: 9 INJECTION, SOLUTION INTRAVENOUS at 12:57

## 2020-08-10 RX ADMIN — PRAVASTATIN SODIUM 20 MG: 20 TABLET ORAL at 22:09

## 2020-08-10 RX ADMIN — OXYCODONE AND ACETAMINOPHEN 1 TABLET: 5; 325 TABLET ORAL at 05:12

## 2020-08-10 RX ADMIN — PREGABALIN 300 MG: 100 CAPSULE ORAL at 09:11

## 2020-08-10 RX ADMIN — FENOFIBRATE 54 MG: 54 TABLET ORAL at 09:11

## 2020-08-10 RX ADMIN — Medication 10 ML: at 09:11

## 2020-08-10 RX ADMIN — DULOXETINE HYDROCHLORIDE 30 MG: 30 CAPSULE, DELAYED RELEASE ORAL at 09:11

## 2020-08-10 RX ADMIN — OXYCODONE AND ACETAMINOPHEN 1 TABLET: 5; 325 TABLET ORAL at 18:18

## 2020-08-10 RX ADMIN — SODIUM CHLORIDE, PRESERVATIVE FREE 10 ML: 5 INJECTION INTRAVENOUS at 14:24

## 2020-08-10 RX ADMIN — APIXABAN 5 MG: 5 TABLET, FILM COATED ORAL at 22:09

## 2020-08-10 RX ADMIN — PIPERACILLIN AND TAZOBACTAM 3.38 G: 3; .375 INJECTION, POWDER, FOR SOLUTION INTRAVENOUS at 00:45

## 2020-08-10 RX ADMIN — Medication 100 UNITS: at 09:11

## 2020-08-10 RX ADMIN — Medication 10 ML: at 22:09

## 2020-08-10 RX ADMIN — MORPHINE SULFATE 2 MG: 2 INJECTION, SOLUTION INTRAMUSCULAR; INTRAVENOUS at 10:21

## 2020-08-10 RX ADMIN — PANTOPRAZOLE SODIUM 40 MG: 40 TABLET, DELAYED RELEASE ORAL at 05:12

## 2020-08-10 RX ADMIN — PREGABALIN 300 MG: 100 CAPSULE ORAL at 22:09

## 2020-08-10 RX ADMIN — MORPHINE SULFATE 2 MG: 2 INJECTION, SOLUTION INTRAMUSCULAR; INTRAVENOUS at 19:23

## 2020-08-10 RX ADMIN — ENOXAPARIN SODIUM 120 MG: 120 INJECTION SUBCUTANEOUS at 09:11

## 2020-08-10 RX ADMIN — MORPHINE SULFATE 2 MG: 2 INJECTION, SOLUTION INTRAMUSCULAR; INTRAVENOUS at 06:16

## 2020-08-10 RX ADMIN — MORPHINE SULFATE 2 MG: 2 INJECTION, SOLUTION INTRAMUSCULAR; INTRAVENOUS at 14:24

## 2020-08-10 RX ADMIN — INSULIN LISPRO 2 UNITS: 100 INJECTION, SOLUTION INTRAVENOUS; SUBCUTANEOUS at 16:46

## 2020-08-10 RX ADMIN — SODIUM CHLORIDE: 9 INJECTION, SOLUTION INTRAVENOUS at 05:12

## 2020-08-10 RX ADMIN — POTASSIUM CHLORIDE 20 MEQ: 1500 TABLET, EXTENDED RELEASE ORAL at 09:11

## 2020-08-10 RX ADMIN — INSULIN LISPRO 2 UNITS: 100 INJECTION, SOLUTION INTRAVENOUS; SUBCUTANEOUS at 11:34

## 2020-08-10 RX ADMIN — PIPERACILLIN AND TAZOBACTAM 3.38 G: 3; .375 INJECTION, POWDER, FOR SOLUTION INTRAVENOUS at 16:46

## 2020-08-10 ASSESSMENT — PAIN SCALES - GENERAL
PAINLEVEL_OUTOF10: 6
PAINLEVEL_OUTOF10: 9
PAINLEVEL_OUTOF10: 7
PAINLEVEL_OUTOF10: 10
PAINLEVEL_OUTOF10: 9
PAINLEVEL_OUTOF10: 10
PAINLEVEL_OUTOF10: 7
PAINLEVEL_OUTOF10: 8
PAINLEVEL_OUTOF10: 10
PAINLEVEL_OUTOF10: 7

## 2020-08-10 ASSESSMENT — PAIN DESCRIPTION - PAIN TYPE
TYPE: CHRONIC PAIN

## 2020-08-10 ASSESSMENT — PAIN DESCRIPTION - ONSET
ONSET: ON-GOING

## 2020-08-10 ASSESSMENT — PAIN DESCRIPTION - FREQUENCY
FREQUENCY: CONTINUOUS

## 2020-08-10 ASSESSMENT — PAIN DESCRIPTION - LOCATION
LOCATION: BUTTOCKS;BACK
LOCATION: BACK;BUTTOCKS;ABDOMEN

## 2020-08-10 ASSESSMENT — PAIN DESCRIPTION - PROGRESSION
CLINICAL_PROGRESSION: GRADUALLY WORSENING
CLINICAL_PROGRESSION: NOT CHANGED
CLINICAL_PROGRESSION: GRADUALLY WORSENING

## 2020-08-10 ASSESSMENT — PAIN DESCRIPTION - ORIENTATION
ORIENTATION: LOWER

## 2020-08-10 ASSESSMENT — PAIN DESCRIPTION - DESCRIPTORS
DESCRIPTORS: ACHING;CONSTANT;DISCOMFORT
DESCRIPTORS: ACHING;DISCOMFORT
DESCRIPTORS: ACHING;CONSTANT;DISCOMFORT

## 2020-08-10 NOTE — CARE COORDINATION
Anticipate discharge today. Plan is Dandrige at discharge. Per Tralene they will and can take him back today. Envelope and ambulance in soft chart. COVID - 8/6. Ashish BREWER

## 2020-08-10 NOTE — PROGRESS NOTES
[address] [place] [address] [facility]  [facility]  Progress Note    SUBJECTIVE:  Chief Complaint   Patient presents with    Abnormal CT     sent from [place] with abnormal CT done today, only c/o are pain from pressure ulcers on coccyx     The patient feels well. Tolerating antibiotics. Minimal abdominal pain. Review of systems:  As stated above in the chief complaint, otherwise negative. Medications:  Scheduled Meds:   piperacillin-tazobactam  3.375 g Intravenous Q8H    And    sodium chloride   Intravenous Q8H    enoxaparin  1 mg/kg Subcutaneous BID    insulin glargine  10 Units Subcutaneous Nightly    insulin lispro  0-12 Units Subcutaneous TID WC    sodium chloride flush  10 mL Intravenous 2 times per day    DULoxetine  30 mg Oral Daily    fenofibrate  54 mg Oral Daily    fluticasone  1 spray Each Nostril Daily    [Held by provider] insulin lispro  5 Units Subcutaneous TID WC    [Held by provider] lisinopril  20 mg Oral Daily    pantoprazole  40 mg Oral QAM AC    pravastatin  20 mg Oral Nightly    pregabalin  300 mg Oral BID    potassium chloride  20 mEq Oral Daily     Continuous Infusions:   dextrose       PRN Meds:iopamidol, sodium chloride flush, heparin flush, cyclobenzaprine, melatonin, glucose, dextrose, glucagon (rDNA), dextrose, acetaminophen **OR** acetaminophen, polyethylene glycol, promethazine **OR** ondansetron, morphine, oxyCODONE-acetaminophen    OBJECTIVE:  BP (!) 101/56   Pulse 116   Temp 98.7 °F (37.1 °C) (Temporal)   Resp 18   Ht 6' 1\" (1.854 m)   Wt 240 lb (108.9 kg)   SpO2 98%   BMI 31.66 kg/m²   Temp  Av.9 °F (36.6 °C)  Min: 97 °F (36.1 °C)  Max: 98.7 °F (37.1 °C)  Constitutional: The patient is lying in bed. Awake and alert. No distress. Visitor present. Skin: Warm and dry. No rashes were noted. HEENT: Round and reactive pupils. Moist mucous membranes. No ulcerations or thrush. Neck: Supple to movements. Chest: No respiratory distress.

## 2020-08-10 NOTE — PROGRESS NOTES
Subjective: The patient is awake and alert. No problems overnight. He is feeling well today. Has mild abdominal discomfort and JULIO drain in place. Denies chest pain, angina, dyspnea or abdominal discomfort. No nausea or vomiting. Tolerating diet. Objective:    /60   Pulse 120   Temp 97.8 °F (36.6 °C) (Temporal)   Resp 16   Ht 6' 1\" (1.854 m)   Wt 240 lb (108.9 kg)   SpO2 98%   BMI 31.66 kg/m²     General: NAD  HEENT: No thrush or mucositis, EOMI, PERRLA  Heart:  RRR, no murmurs, gallops, or rubs. Lungs:  CTA bilaterally, no wheeze, rales or rhonchi  Abd: BS present, nontender, nondistended, no masses  Extrem: +R cholecystostomy drain. No clubbing, cyanosis. Trace BLE edema  Lymphatics: No palpable adenopathy in cervical and supraclavicular regions  Skin: +L picc.   Intact, no petechia or purpura    CBC with Differential:    Lab Results   Component Value Date    WBC 14.2 08/10/2020    RBC 3.73 08/10/2020    HGB 10.2 08/10/2020    HCT 32.5 08/10/2020    PLT 1,100 08/10/2020    MCV 87.1 08/10/2020    MCH 27.3 08/10/2020    MCHC 31.4 08/10/2020    RDW 14.9 08/10/2020    NRBC 0.9 08/01/2020    SEGSPCT 80 11/26/2013    LYMPHOPCT 10.5 08/10/2020    MONOPCT 4.4 08/10/2020    BASOPCT 0.8 08/10/2020    MONOSABS 0.57 08/10/2020    LYMPHSABS 1.56 08/10/2020    EOSABS 0.37 08/10/2020    BASOSABS 0.00 08/10/2020     CMP:    Lab Results   Component Value Date     08/06/2020    K 4.5 08/06/2020    CL 98 08/06/2020    CO2 22 08/06/2020    BUN 10 08/06/2020    CREATININE 0.8 08/06/2020    GFRAA >60 08/06/2020    LABGLOM >60 08/06/2020    GLUCOSE 173 08/06/2020    GLUCOSE 125 05/11/2012    PROT 6.3 08/06/2020    LABALBU 3.0 08/06/2020    LABALBU 4.8 05/11/2012    CALCIUM 9.3 08/06/2020    BILITOT 0.2 08/06/2020    ALKPHOS 127 08/06/2020    AST 8 08/06/2020    ALT 5 08/06/2020      WVWQ:[id number]}     Current Facility-Administered Medications:     apixaban (ELIQUIS) tablet 5 mg, 5 mg, Oral, BID, Glory Fonder L Ewelinaer, APRN - CNS    piperacillin-tazobactam (ZOSYN) 3.375 g in dextrose 5 % 100 mL IVPB extended infusion (mini-bag), 3.375 g, Intravenous, Q8H, Stopped at 08/10/20 1257 **AND** 0.9% Sodium Chloride for post Zosyn infusion, , Intravenous, Q8H, Jeff Munoz MD, Stopped at 08/10/20 1421    insulin glargine (LANTUS) injection vial 10 Units, 10 Units, Subcutaneous, Nightly, Norma Boudreaux MD, 10 Units at 08/09/20 2011    insulin lispro (HUMALOG) injection vial 0-12 Units, 0-12 Units, Subcutaneous, TID WC, Norma Boudreaux MD, 2 Units at 08/10/20 1134    iopamidol (ISOVUE-300) 61 % injection 20 mL, 20 mL, Other, ONCE PRN, Norma Boudreaux MD    sodium chloride flush 0.9 % injection 10 mL, 10 mL, Intravenous, 2 times per day, Norma Boudreaux MD, 10 mL at 08/10/20 0911    sodium chloride flush 0.9 % injection 10 mL, 10 mL, Intravenous, PRN, Norma Boudreuax MD, 10 mL at 08/10/20 1424    heparin flush 100 UNIT/ML injection 100 Units, 100 Units, Intracatheter, PRN, Norma Boudreaux MD, 100 Units at 08/10/20 0911    cyclobenzaprine (FLEXERIL) tablet 5 mg, 5 mg, Oral, BID PRN, Norma Boudreaux MD, 5 mg at 08/02/20 0857    DULoxetine (CYMBALTA) extended release capsule 30 mg, 30 mg, Oral, Daily, Norma Boudreaux MD, 30 mg at 08/10/20 0911    fenofibrate (TRICOR) tablet 54 mg, 54 mg, Oral, Daily, Norma Boudreaux MD, 54 mg at 08/10/20 0911    fluticasone (FLONASE) 50 MCG/ACT nasal spray 1 spray, 1 spray, Each Nostril, Daily, Norma Boudreaux MD, 1 spray at 08/10/20 0911    [Held by provider] insulin lispro (HUMALOG) injection vial 5 Units, 5 Units, Subcutaneous, TID WC, Norma Boudreaux MD, Stopped at 08/02/20 0859    [Held by provider] lisinopril (PRINIVIL;ZESTRIL) tablet 20 mg, 20 mg, Oral, Daily, Norma Boudreaux MD, 20 mg at 08/01/20 0848    melatonin tablet 3 mg, 3 mg, Oral, Nightly PRN, Norma Boudreaux MD    pantoprazole (PROTONIX) tablet 40 mg, 40 mg, Oral, QAM AC, Norma Boudreaux MD, 40 mg at 08/10/20 0512    pravastatin (PRAVACHOL) tablet 20 mg, 20 mg, Oral, Nightly, Daniela Ackerman MD, 20 mg at 08/09/20 2006    pregabalin (LYRICA) capsule 300 mg, 300 mg, Oral, BID, Daniela Ackerman MD, 300 mg at 08/10/20 0911    glucose (GLUTOSE) 40 % oral gel 15 g, 15 g, Oral, PRN, Daniela Ackerman MD    dextrose 50 % IV solution, 12.5 g, Intravenous, PRN, Daniela Ackerman MD    glucagon (rDNA) injection 1 mg, 1 mg, Intramuscular, PRN, Daniela Ackerman MD    dextrose 5 % solution, 100 mL/hr, Intravenous, PRN, Daniela Ackerman MD    acetaminophen (TYLENOL) tablet 650 mg, 650 mg, Oral, Q6H PRN, 650 mg at 08/01/20 1954 **OR** acetaminophen (TYLENOL) suppository 650 mg, 650 mg, Rectal, Q6H PRN, Daniela Ackerman MD    polyethylene glycol (GLYCOLAX) packet 17 g, 17 g, Oral, Daily PRN, Daniela Ackerman MD    promethazine (PHENERGAN) tablet 12.5 mg, 12.5 mg, Oral, Q6H PRN **OR** ondansetron (ZOFRAN) injection 4 mg, 4 mg, Intravenous, Q6H PRN, Daniela Ackerman MD, 4 mg at 08/05/20 2113    potassium chloride (KLOR-CON M) extended release tablet 20 mEq, 20 mEq, Oral, Daily, Daniela Ackerman MD, 20 mEq at 08/10/20 0911    morphine (PF) injection 2 mg, 2 mg, Intravenous, Q4H PRN, Daniela Ackerman MD, 2 mg at 08/10/20 1424    oxyCODONE-acetaminophen (PERCOCET) 5-325 MG per tablet 1 tablet, 1 tablet, Oral, Q6H PRN, Daniela Ackerman MD, 1 tablet at 08/10/20 1135    CTA [address]   Final Result      1. Positive for PE bilateral lower lobes. 2. New left basilar atelectasis. 3. No effusion. 4. Abnormal spleen, appearance consistent with infarction, with a   developing lateral subcapsular fluid collection. 5. Interval placement of a cholecystostomy pigtail drain. Contracted   gallbladder without obvious stones. 6. Report called to Dr. Tyra Funes at approximately 1850 hours 8/6/2020. NOTE:  THIS IS AN ABNORMAL REPORT.                XR CHEST PORTABLE   Final Result   Addendum 1 of 1 tube placement 8/3/2020. Plan: Thrombocytosis is reactive infection and recent splenic infarct/functional asplenia. His prior labs revelaed a normal platelet count in June. Ghassan 2 mutation was negative on 8/320. 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 Check CALR and MPL mutations  Lifelong anticoagulation, Eliquis 5 mg oral BID  Antibiotics for acute cholecystitis.         Electronically signed by Whit Romero MD on 8/10/2020 at 4:35 PM

## 2020-08-10 NOTE — PROGRESS NOTES
Hospitalist Progress Note      PCP: Melanie Apodaca, APRN - CNP    Date of Admission: 2020    Chief Complaint: abdominal pain    Hospital Course:   53 y.o. male who presented to [facility] from nursing facility with past medical history of DJD of the spine, osteoarthritis status post bilateral total hip replacement, obesity, diabetes and hypertension. Patient was admitted at [address] [place] [address] from  through 2020. According to patient, he had what he describes as \"I  twice\" suspected Cardiac arrest x2 and was \"in coma for 3 weeks\". I do not have hospital notes from this admission however some information are available in care everywhere. He developed sacral decubitus ulcer prior to being discharged from the hospital, his wound was infected and had debridement done. He has been on Zosyn and has a wound VAC that has been draining. Was also noted to have abnormal CT scan of his abdomen showing a large area of splenic abscess, splenic infarction with splenic vein thrombosis and PE. He has been on Lovenox since. Medical records were reviewed. Patient was sent in from the nursing facility today because of abnormal CT scan of the abdomen. He continues to have abdominal pain which he states has been ongoing for the past month, pain is generalized, sharp, severe, constant, associated with multiple episodes of diarrhea, nausea and vomiting on and off. He is on Percocet with which relieves his pain. No fever, cough, chest pain or shortness of breath. No leg swelling. Vital signs notable for respiratory rate of 20, pulse rate 138. Labs showed negative COVID at the outside facility, globin 11.1, glucose 188, platelet count 1,176,061.   Lactic acid level 1.9, INR 1.2, CT scan of the abdomen and pelvis showed absent spleen, a 9 x 13 cm cystic collection in the splenic bed, questioning abscess, fatty liver, inflamed and distended small bowel, collapsed and thickened large bowel, thickened gallbladder concerning for cholecystitis and a large 4 x 2.9 cm sacral decubitus ulcer with signs of cellulitis and phlegmon. 8/10/20: leukocytosis slightly decreased, thrombocytosis increasing. Patient continues on zosyn. RN to call hem/onc for recommendations prior to discharge regarding platelets. Subjective:  Patient sitting up in chair at bedside. He has no complaints at the present time. Medications:  Reviewed    Infusion Medications    dextrose       Scheduled Medications    piperacillin-tazobactam  3.375 g Intravenous Q8H    And    sodium chloride   Intravenous Q8H    enoxaparin  1 mg/kg Subcutaneous BID    insulin glargine  10 Units Subcutaneous Nightly    insulin lispro  0-12 Units Subcutaneous TID WC    sodium chloride flush  10 mL Intravenous 2 times per day    DULoxetine  30 mg Oral Daily    fenofibrate  54 mg Oral Daily    fluticasone  1 spray Each Nostril Daily    [Held by provider] insulin lispro  5 Units Subcutaneous TID WC    [Held by provider] lisinopril  20 mg Oral Daily    pantoprazole  40 mg Oral QAM AC    pravastatin  20 mg Oral Nightly    pregabalin  300 mg Oral BID    potassium chloride  20 mEq Oral Daily     PRN Meds: iopamidol, sodium chloride flush, heparin flush, cyclobenzaprine, melatonin, glucose, dextrose, glucagon (rDNA), dextrose, acetaminophen **OR** acetaminophen, polyethylene glycol, promethazine **OR** ondansetron, morphine, oxyCODONE-acetaminophen      Intake/Output Summary (Last 24 hours) at 8/10/2020 1529  Last data filed at 8/10/2020 1457  Gross per 24 hour   Intake 960 ml   Output 2330 ml   Net -1370 ml       Exam:    BP (!) 101/56   Pulse 116   Temp 98.7 °F (37.1 °C) (Temporal)   Resp 18   Ht 6' 1\" (1.854 m)   Wt 240 lb (108.9 kg)   SpO2 98%   BMI 31.66 kg/m²     General appearance: No apparent distress, appears stated age and cooperative. HEENT: Pupils equal, round, and reactive to light. Conjunctivae/corneas clear.   Neck: Supple, with full range of motion. No jugular venous distention. Trachea midline. Respiratory:  Normal respiratory effort. Clear to auscultation, bilaterally without Rales/Wheezes/Rhonchi. Cardiovascular: Regular rate and rhythm with normal S1/S2 without murmurs, rubs or gallops. Abdomen: Soft, non-tender, non-distended with normal bowel sounds. Musculoskeletal: No clubbing, cyanosis or edema bilaterally. Full range of motion without deformity. Skin: Skin color, texture, turgor normal.  No rashes or lesions. Neurologic:  Neurovascularly intact without any focal sensory/motor deficits. Psychiatric: Alert and oriented, thought content appropriate, normal insight  Capillary Refill: Brisk,< 3 seconds   Peripheral Pulses: +2 palpable, equal bilaterally       Labs:   Recent Labs     08/08/20  0557 08/09/20  0509 08/10/20  0700   WBC 8.4 16.7* 14.2*   HGB 10.0* 10.1* 10.2*   HCT 32.5* 32.4* 32.5*   * 914* 1,100*     No results for input(s): NA, K, CL, CO2, BUN, CREATININE, CALCIUM, PHOS in the last 72 hours. Invalid input(s): MAGNES  No results for input(s): AST, ALT, BILIDIR, BILITOT, ALKPHOS in the last 72 hours. No results for input(s): INR in the last 72 hours. No results for input(s): Azar Bower in the last 72 hours. CTA CHEST W CONTRAST   Final Result      1. Positive for PE bilateral lower lobes. 2. New left basilar atelectasis. 3. No effusion. 4. Abnormal spleen, appearance consistent with infarction, with a   developing lateral subcapsular fluid collection. 5. Interval placement of a cholecystostomy pigtail drain. Contracted   gallbladder without obvious stones. 6. Report called to Dr. Cornelious Lesches at approximately 1850 hours 8/6/2020. NOTE:  THIS IS AN ABNORMAL REPORT. XR CHEST PORTABLE   Final Result   Addendum 1 of 1   Addendum: In addition there is a left PICC line with tip probably in   the distal SVC, no pneumothorax and not changed.       Final      CT PTC NEW ACCESS   Final Result   1. Uncomplicated image-guided percutaneous cholecystostomy. 2. Cholecystogram demonstrating cystic duct occlusion. CT HEAD WO CONTRAST   Final Result   No acute intracranial abnormality. XR CHEST PORTABLE   Final Result   Left-sided PICC line, as described. No acute cardiopulmonary disease process is identified. The study was dictated by Julia Sanders PA-C and Mirella Mckeon MD   reviewed and concurred with the findings. NM HEPATOBILIARY   Final Result   Abnormal hepatobiliary scan with evidence of acute cholecystitis.                                  CT TUBE CATHETER FOLLOW UP    (Results Pending)       Assessment/Plan:    Active Hospital Problems    Diagnosis Date Noted    Thrombocytosis ([facility] [address] [D47.3] 08/10/2020    Decubitus ulcer of sacral area [L89.159] 08/01/2020    Abnormal findings on diagnostic imaging of gall bladder [R93.2] 08/01/2020    Splenic infarction [D73.5] 08/01/2020    Cyst of spleen [D73.4] 08/01/2020    Splenic abscess [D73.3] 08/01/2020    Anemia [D64.9] 08/01/2020    Pulmonary embolism ([facility] [address] [I26.99] 08/01/2020    Enterocolitis [K52.9] 07/31/2020    Cellulitis of sacral region [L03.319] 07/02/2019    Type 2 diabetes mellitus ([address] [E11.9] 04/08/2016    Class 1 obesity in adult [E66.9] 12/17/2015    Essential hypertension [I10] 10/16/2015     Plan  Recommendations per Hem/onc  Start eliquis 5 mg BID  Stop lovenox  Monitor labs  Glucose control  Neurovascular control  BP control  Covid result good until 8/13    DVT Prophylaxis: eliquis  Diet: DIET GENERAL;  Dietary Nutrition Supplements: Low Calorie High Protein Supplement  Code Status: Full Code    PT/OT Eval Status: ordered    Dispo - discharge planning    Richard Harkins CNP

## 2020-08-11 VITALS
WEIGHT: 239.25 LBS | BODY MASS INDEX: 31.71 KG/M2 | OXYGEN SATURATION: 97 % | HEIGHT: 73 IN | HEART RATE: 121 BPM | DIASTOLIC BLOOD PRESSURE: 74 MMHG | TEMPERATURE: 98.2 F | RESPIRATION RATE: 16 BRPM | SYSTOLIC BLOOD PRESSURE: 128 MMHG

## 2020-08-11 LAB
METER GLUCOSE: 115 MG/DL (ref 74–99)
METER GLUCOSE: 123 MG/DL (ref 74–99)
METER GLUCOSE: 93 MG/DL (ref 74–99)

## 2020-08-11 PROCEDURE — 6370000000 HC RX 637 (ALT 250 FOR IP): Performed by: CLINICAL NURSE SPECIALIST

## 2020-08-11 PROCEDURE — 2580000003 HC RX 258: Performed by: SPECIALIST

## 2020-08-11 PROCEDURE — 82962 GLUCOSE BLOOD TEST: CPT

## 2020-08-11 PROCEDURE — 97530 THERAPEUTIC ACTIVITIES: CPT

## 2020-08-11 PROCEDURE — 6360000002 HC RX W HCPCS: Performed by: INTERNAL MEDICINE

## 2020-08-11 PROCEDURE — 6370000000 HC RX 637 (ALT 250 FOR IP): Performed by: INTERNAL MEDICINE

## 2020-08-11 PROCEDURE — 2580000003 HC RX 258: Performed by: INTERNAL MEDICINE

## 2020-08-11 PROCEDURE — 6360000002 HC RX W HCPCS: Performed by: SPECIALIST

## 2020-08-11 RX ORDER — PIPERACILLIN SODIUM, TAZOBACTAM SODIUM 3; .375 G/15ML; G/15ML
3.38 INJECTION, POWDER, LYOPHILIZED, FOR SOLUTION INTRAVENOUS EVERY 8 HOURS
Qty: 40.5 G | Refills: 0 | Status: SHIPPED | OUTPATIENT
Start: 2020-08-11 | End: 2020-08-15

## 2020-08-11 RX ORDER — PIPERACILLIN SODIUM, TAZOBACTAM SODIUM 3; .375 G/15ML; G/15ML
3.38 INJECTION, POWDER, LYOPHILIZED, FOR SOLUTION INTRAVENOUS EVERY 8 HOURS
Qty: 40.5 G | Refills: 0 | Status: SHIPPED | OUTPATIENT
Start: 2020-08-11 | End: 2020-08-11 | Stop reason: SDUPTHER

## 2020-08-11 RX ADMIN — Medication 100 UNITS: at 11:33

## 2020-08-11 RX ADMIN — APIXABAN 5 MG: 5 TABLET, FILM COATED ORAL at 09:11

## 2020-08-11 RX ADMIN — PIPERACILLIN AND TAZOBACTAM 3.38 G: 3; .375 INJECTION, POWDER, FOR SOLUTION INTRAVENOUS at 02:14

## 2020-08-11 RX ADMIN — OXYCODONE AND ACETAMINOPHEN 1 TABLET: 5; 325 TABLET ORAL at 09:11

## 2020-08-11 RX ADMIN — SODIUM CHLORIDE: 9 INJECTION, SOLUTION INTRAVENOUS at 06:32

## 2020-08-11 RX ADMIN — POTASSIUM CHLORIDE 20 MEQ: 1500 TABLET, EXTENDED RELEASE ORAL at 09:11

## 2020-08-11 RX ADMIN — PREGABALIN 300 MG: 100 CAPSULE ORAL at 09:11

## 2020-08-11 RX ADMIN — MORPHINE SULFATE 2 MG: 2 INJECTION, SOLUTION INTRAMUSCULAR; INTRAVENOUS at 06:42

## 2020-08-11 RX ADMIN — OXYCODONE AND ACETAMINOPHEN 1 TABLET: 5; 325 TABLET ORAL at 02:14

## 2020-08-11 RX ADMIN — MORPHINE SULFATE 2 MG: 2 INJECTION, SOLUTION INTRAMUSCULAR; INTRAVENOUS at 11:33

## 2020-08-11 RX ADMIN — SODIUM CHLORIDE, PRESERVATIVE FREE 10 ML: 5 INJECTION INTRAVENOUS at 16:27

## 2020-08-11 RX ADMIN — PIPERACILLIN AND TAZOBACTAM 3.38 G: 3; .375 INJECTION, POWDER, FOR SOLUTION INTRAVENOUS at 16:22

## 2020-08-11 RX ADMIN — PIPERACILLIN AND TAZOBACTAM 3.38 G: 3; .375 INJECTION, POWDER, FOR SOLUTION INTRAVENOUS at 09:21

## 2020-08-11 RX ADMIN — DULOXETINE HYDROCHLORIDE 30 MG: 30 CAPSULE, DELAYED RELEASE ORAL at 09:11

## 2020-08-11 RX ADMIN — SODIUM CHLORIDE: 9 INJECTION, SOLUTION INTRAVENOUS at 13:21

## 2020-08-11 RX ADMIN — MORPHINE SULFATE 2 MG: 2 INJECTION, SOLUTION INTRAMUSCULAR; INTRAVENOUS at 16:26

## 2020-08-11 RX ADMIN — Medication 10 ML: at 09:11

## 2020-08-11 RX ADMIN — PANTOPRAZOLE SODIUM 40 MG: 40 TABLET, DELAYED RELEASE ORAL at 06:32

## 2020-08-11 RX ADMIN — OXYCODONE AND ACETAMINOPHEN 1 TABLET: 5; 325 TABLET ORAL at 15:23

## 2020-08-11 RX ADMIN — SODIUM CHLORIDE, PRESERVATIVE FREE 10 ML: 5 INJECTION INTRAVENOUS at 11:33

## 2020-08-11 RX ADMIN — FLUTICASONE PROPIONATE 1 SPRAY: 50 SPRAY, METERED NASAL at 09:12

## 2020-08-11 RX ADMIN — FENOFIBRATE 54 MG: 54 TABLET ORAL at 09:11

## 2020-08-11 ASSESSMENT — PAIN DESCRIPTION - PROGRESSION: CLINICAL_PROGRESSION: NOT CHANGED

## 2020-08-11 ASSESSMENT — PAIN SCALES - GENERAL
PAINLEVEL_OUTOF10: 10
PAINLEVEL_OUTOF10: 8
PAINLEVEL_OUTOF10: 9
PAINLEVEL_OUTOF10: 8
PAINLEVEL_OUTOF10: 10
PAINLEVEL_OUTOF10: 9

## 2020-08-11 ASSESSMENT — PAIN DESCRIPTION - DESCRIPTORS: DESCRIPTORS: ACHING;DISCOMFORT;CONSTANT

## 2020-08-11 ASSESSMENT — PAIN DESCRIPTION - LOCATION: LOCATION: BACK;BUTTOCKS;ABDOMEN

## 2020-08-11 ASSESSMENT — PAIN - FUNCTIONAL ASSESSMENT: PAIN_FUNCTIONAL_ASSESSMENT: PREVENTS OR INTERFERES SOME ACTIVE ACTIVITIES AND ADLS

## 2020-08-11 ASSESSMENT — PAIN DESCRIPTION - PAIN TYPE: TYPE: CHRONIC PAIN

## 2020-08-11 ASSESSMENT — PAIN DESCRIPTION - FREQUENCY: FREQUENCY: CONTINUOUS

## 2020-08-11 ASSESSMENT — PAIN DESCRIPTION - ONSET: ONSET: ON-GOING

## 2020-08-11 NOTE — PROGRESS NOTES
[address] [place] [address] [facility]  LUDAIDA  Progress Note    SUBJECTIVE:  Chief Complaint   Patient presents with    Abnormal CT     sent from [place] with abnormal CT done today, only c/o are pain from pressure ulcers on coccyx     The patient feels well. Tolerating antibiotics. No pain today. Review of systems:  As stated above in the chief complaint, otherwise negative. Medications:  Scheduled Meds:   apixaban  5 mg Oral BID    piperacillin-tazobactam  3.375 g Intravenous Q8H    And    sodium chloride   Intravenous Q8H    insulin glargine  10 Units Subcutaneous Nightly    insulin lispro  0-12 Units Subcutaneous TID WC    sodium chloride flush  10 mL Intravenous 2 times per day    DULoxetine  30 mg Oral Daily    fenofibrate  54 mg Oral Daily    fluticasone  1 spray Each Nostril Daily    [Held by provider] insulin lispro  5 Units Subcutaneous TID WC    [Held by provider] lisinopril  20 mg Oral Daily    pantoprazole  40 mg Oral QAM AC    pravastatin  20 mg Oral Nightly    pregabalin  300 mg Oral BID    potassium chloride  20 mEq Oral Daily     Continuous Infusions:   dextrose       PRN Meds:iopamidol, sodium chloride flush, heparin flush, cyclobenzaprine, melatonin, glucose, dextrose, glucagon (rDNA), dextrose, acetaminophen **OR** acetaminophen, polyethylene glycol, promethazine **OR** ondansetron, morphine, oxyCODONE-acetaminophen    OBJECTIVE:  /74   Pulse 120   Temp 97.7 °F (36.5 °C) (Tympanic)   Resp 20   Ht 6' 1\" (1.854 m)   Wt 239 lb 4 oz (108.5 kg)   SpO2 97%   BMI 31.57 kg/m²   Temp  Av.7 °F (36.5 °C)  Min: 97.5 °F (36.4 °C)  Max: 97.8 °F (36.6 °C)  Constitutional: The patient is lying in bed. Awake and alert. No distress. Sitting on the side of the bed. Son present. Skin: Warm and dry. No rashes were noted. HEENT: Round and reactive pupils. Moist mucous membranes. No ulcerations or thrush. Neck: Supple to movements.    Chest: No respiratory pansensitive Pseudomonas aeruginosa  Biliary fluid 8/3/2020: Negative so far    No results for input(s): PROCAL in the last 72 hours. ASSESSMENT:  · Acute cholecystitis. Status post CT-guided cholecystostomy tube placement 8/3/2020. Cultures negative because of previous antibiotic treatment  · History of pulmonary embolism, [facility] at [facility]  · Large fluid collection over the splenic bed. Abscess seems to be less likely  · Functional asplenia  · Leukocytosis associated to cholecystitis, and asplenia  · Stage IV sacral decubitus ulcer.   Colonized but not infected    PLAN:  · Continue Zosyn until at least 8/15/2020  · No need to treat organisms in the decubitus ulcer  · Patient can be discharged to [address] [place] [address] from ID standpoint  · Patient will need vaccination for functional asplenia    Segun Leung  12:38 PM  8/11/2020

## 2020-08-11 NOTE — DISCHARGE INSTR - COC
Continuity of Care Form    Patient Name: Palma Cedillo   :  1975  MRN:  [id number]    Admit date:  2020  Discharge date:  2020    Code Status Order: Full Code   Advance Directives:   [facility] Documentation     Date/Time Healthcare Directive Type of Healthcare Directive Copy in [address] Agent's Name Healthcare Agent's Phone Number    [phone]  No, patient does not have an advance directive for healthcare treatment -- -- -- -- --          Admitting Physician:  Debora Narvaez MD  PCP: IGOR Colbert - CNP    Discharging Nurse: [facility] Unit/Room#: 0216/1609-C  Discharging Unit Phone Number: [phone]    Emergency Contact:   Extended Emergency Contact Information  Primary Emergency Contact: Felipe Davey of [facility] Phone: [phone]  Mobile Phone: [phone]  Relation: Child  Preferred language: English   needed? No  Secondary Emergency Contact: [facility] Phone: [phone]  Mobile Phone: [phone]  Relation: Brother/Sister  Preferred language: English   needed?  No    Past Surgical History:  Past Surgical History:   Procedure Laterality Date    CT PTC NEW ACCESS  8/3/2020    CT PTC NEW ACCESS 8/3/2020 [facility] CT    FOOT SURGERY Right     to treat shattered bones    HERNIA REPAIR      DOUBLE HERNIA    HERNIA REPAIR Right 2019    LAPAROSCOPIC RIGHT INGUINAL HERNIA REPAIR, MESH 10x15 cm PLACEMENT performed by Clayton Hall MD at [facility] Left 2016    NECK SURGERY  2000    FUSION    ID COLONOSCOPY FLX DX W/COLLJ SPEC WHEN PFRMD N/A 2018    COLONOSCOPY DIAGNOSTIC performed by Adalgisa Barrios MD at [facility] Left     Dr. Hyde [place] ARTHROSCOPY Left 11 21 14    SHOULDER SURGERY   &    RIGHT AND LEFT repair of tears    TOTAL HIP ARTHROPLASTY Right 10/31/2016 Total right hip  Sobeida Zarate MD    VASECTOMY      WRIST SURGERY  2007    LEFT WRIST       Immunization History:   Immunization History   Administered Date(s) Administered    Influenza Virus Vaccine 11/23/2015, 09/26/2019    Influenza, Epimenio Agueda, IM, PF (6 mo and older Fluzone, Flulaval, Fluarix, and 3 yrs and older Afluria) 10/04/2016, 10/26/2017, 09/26/2019    Pneumococcal Polysaccharide ([id number]) 10/04/2016, 07/08/2019    Tdap (Boostrix, Adacel) 10/04/2016       Active Problems:  Patient Active Problem List   Diagnosis Code    Neuropathic pain M79.2    Lumbar spondylosis M47.816    Osteoarthritis M19.90    Insomnia G47.00    Fibromyalgia M79.7    Vitamin D deficiency E55.9    Essential hypertension I10    Allergic rhinitis J30.9    Lumbar radiculopathy M54.16    Osteoarthritis of spine with radiculopathy, lumbar region M47.26    Class 1 obesity in adult E66.9    Lumbar disc herniation M51.26    Type 2 diabetes mellitus ([facility] [address] E11.9    Primary osteoarthritis of left hip M16.12    Primary osteoarthritis of right hip M16.11    Cellulitis of foot L03.119    Neuropathy G62.9    Cellulitis, wound, post-operative T81.49XA    Left leg swelling M79.89    SIRS (systemic inflammatory response syndrome) (HCC) R65.10    Cellulitis of sacral region L03.319    Rectus diastasis M62.08    Recurrent unilateral inguinal hernia K40.91    Enterocolitis K52.9    Decubitus ulcer of sacral area L89.159    Abnormal findings on diagnostic imaging of gall bladder R93.2    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    Cyst of spleen D73.4    Splenic abscess D73.3    Anemia D64.9    Pulmonary embolism ([facility]) I26.99    Thrombocytosis ([facility]) D47.3       Isolation/Infection:   Isolation          No Isolation        Patient Infection Status     Infection Onset Added Last Indicated Last Indicated By Review Planned Expiration Resolved Resolved By    None active    Resolved    COVID-19 Rule Out 08/06/20 08/06/20 08/06/20 COVID-19 (Ordered)   08/06/20 Rule-Out Test Resulted    C-diff Rule Out 07/31/20 07/31/20 07/31/20 Clostridium difficile EIA (Ordered)   08/06/20 Shadi Whitman RN    Order discontinued by RN/physician    MRSA 06/30/19 07/02/19 06/30/19 Wound Culture   08/06/20 Shadi Whitman RN          Nurse Assessment:  Last Vital Signs: /74   Pulse 121   Temp 98.2 °F (36.8 °C) (Temporal)   Resp 16   Ht 6' 1\" (1.854 m)   Wt 239 lb 4 oz (108.5 kg)   SpO2 97%   BMI 31.57 kg/m²     Last documented pain score (0-10 scale): Pain Level: 9  Last Weight:   Wt Readings from Last 1 Encounters:   08/11/20 239 lb 4 oz (108.5 kg)     Mental Status:  oriented and alert    IV Access:  PICC L Brachial inserted 7/31/2020     Nursing Mobility/ADLs:  Walking   Assisted  Transfer  Independent  Bathing  Independent  Dressing  Independent  Toileting  Assisted  Feeding  Independent  Med Admin  Independent  Med Delivery   whole    Wound Care Documentation and Therapy:  Incision 04/11/16 Hip Left (Active)   Number of days: 8860       Incision 10/31/16 Hip Right (Active)   Number of days: 1380       Wound 07/31/20 Sacrum (Active)   Wound Image   08/05/20 1050   Wound Pressure Stage  4 08/05/20 1050   Dressing Status Clean;Dry; Intact 08/11/20 0852   Dressing Changed Dressing reinforced 08/10/20 2200   Dressing/Treatment 4x4;ABD;Packing; Other (comment) 08/11/20 0852   Wound Cleansed Rinsed/Irrigated with saline 08/10/20 1827   Dressing Change Due 08/11/20 08/11/20 0852   Wound Length (cm) 4 cm 08/05/20 1050   Wound Width (cm) 1.5 cm 08/05/20 1050   Wound Depth (cm) 3 cm 08/05/20 1050   Wound Surface Area (cm^2) 6 cm^2 08/05/20 1050   Change in Wound Size % (l*w) 55.56 08/05/20 1050   Wound Volume (cm^3) 18 cm^3 08/05/20 1050   Wound Healing % 56 08/05/20 1050   Wound Assessment Red 08/10/20 1827   Drainage Amount Scant 08/10/20 1827   Drainage Description Serosanguinous 08/10/20 1827   Odor None 08/10/20 1827   Margins Defined edges 08/03/20 0300 Loli-wound Assessment Other (Comment) 08/06/20 0822   Red%Wound Bed 100 08/05/20 1050   Culture Taken Yes 08/01/20 2300   Number of days: 10       Wound 08/05/20 Buttocks Right; Inner (Active)   Dressing Status Clean;Dry; Intact 08/11/20 0852   Dressing Changed Dressing reinforced 08/10/20 2200   Dressing/Treatment Alginate;Dry dressing;4x4;Other (comment) 08/11/20 0852   Wound Cleansed Rinsed/Irrigated with saline 08/10/20 1827   Dressing Change Due 08/11/20 08/11/20 0852   Wound Length (cm) 4.5 cm 08/05/20 1050   Wound Width (cm) 2 cm 08/05/20 1050   Wound Depth (cm) 0.1 cm 08/05/20 1050   Wound Surface Area (cm^2) 9 cm^2 08/05/20 1050   Wound Volume (cm^3) 0.9 cm^3 08/05/20 1050   Wound Assessment Yellow;Pink 08/10/20 1827   Drainage Amount Scant 08/10/20 1827   Drainage Description Serosanguinous 08/10/20 1827   Odor None 08/10/20 1827   Loli-wound Assessment Intact 08/05/20 1050   Non-staged Wound Description Partial thickness 08/05/20 1050   Red%Wound Bed 100 08/05/20 1050   Number of days: 6       Wound 08/05/20 Buttocks Left; Inner (Active)   Dressing Status Clean;Dry; Intact 08/11/20 0852   Dressing Changed Dressing reinforced 08/11/20 0852   Dressing/Treatment Alginate;Dry dressing;4x4;Other (comment) 08/11/20 0852   Wound Cleansed Rinsed/Irrigated with saline 08/09/20 2145   Dressing Change Due 08/11/20 08/11/20 0852   Wound Length (cm) 4 cm 08/05/20 1050   Wound Width (cm) 3 cm 08/05/20 1050   Wound Depth (cm) 0.1 cm 08/05/20 1050   Wound Surface Area (cm^2) 12 cm^2 08/05/20 1050   Wound Volume (cm^3) 1.2 cm^3 08/05/20 1050   Wound Assessment Pink 08/08/20 2245   Drainage Amount Scant 08/05/20 1050   Drainage Description Serosanguinous 08/05/20 1050   Odor None 08/05/20 1050   Loli-wound Assessment Intact 08/05/20 1050   Non-staged Wound Description Partial thickness 08/05/20 1050   Red%Wound Bed 100 08/05/20 1050   Number of days: 6        Elimination:  Continence:   · Bowel:  Yes  · Bladder: Yes  Urinary Catheter: None   Colostomy/Ileostomy/Ileal Conduit: No       Date of Last BM: 8/11/2020    Intake/Output Summary (Last 24 hours) at 8/11/2020 1758  Last data filed at 8/11/2020 1633  Gross per 24 hour   Intake 930 ml   Output 1750 ml   Net -820 ml     I/O last 3 completed shifts: In: 910 [P.O.:700; I.V.:10; IV Piggyback:200]  Out: 1525 [Urine:950; Drains:575]    Safety Concerns:     None    Impairments/Disabilities:      None    Nutrition Therapy:  Current Nutrition Therapy:   - Oral Diet:  General    Routes of Feeding: Oral  Liquids: No Restrictions  Daily Fluid Restriction: no  Last Modified Barium Swallow with Video (Video Swallowing Test): not done    Treatments at the Time of Hospital Discharge:   Respiratory Treatments: None  Oxygen Therapy:  is not on home oxygen therapy. Ventilator:    - No ventilator support    Rehab Therapies: ***  Weight Bearing Status/Restrictions: No weight bearing restirctions  Other Medical Equipment (for information only, NOT a DME order):  NA  Other Treatments: NA    Patient's personal belongings (please select all that are sent with patient):   Footwear, undergarmets, clothing, cellphone,     RN SIGNATURE:  Electronically signed by Adilene Beth RN on 8/11/20 at 6:45 PM EDT    CASE MANAGEMENT/SOCIAL WORK SECTION    Inpatient Status Date: ***    Readmission Risk Assessment Score:  Readmission Risk              Risk of Unplanned Readmission:        14           Discharging to Facility/ Agency   · Name:   · Address:  · Phone:  · Fax:    Dialysis Facility (if applicable)   · Name:  · Address:  · Dialysis Schedule:  · Phone:  · Fax:    / signature: {Esignature:[id number]}    PHYSICIAN SECTION    Prognosis: {Prognosis:2382080260}    Condition at Discharge: [facility] Patient Condition:[id number]}    Rehab Potential (if transferring to Rehab): {Prognosis:7276097826}    Recommended Labs or Other Treatments After Discharge: ***    Physician Certification: I certify the above information and transfer of Darryl Cleaning II  is necessary for the continuing treatment of the diagnosis listed and that he requires {Admit to Appropriate Level of Care:57971} for {GREATER/LESS:750529106} 30 days.      Update Admission H&P: {CHP DME Changes in [facility]:549671644}    PHYSICIAN SIGNATURE:  Emanuel Maria MD

## 2020-08-11 NOTE — PROGRESS NOTES
Physical Therapy    Physical Therapy Daily Treatment Note      Name: Shalini Avendano  : 1975  MRN: [id number]    Referring Provider:  Dimitrios Glover MD     Date of Service: 2020    Evaluating PT:  Isabelamaria guadalupe Dimas PT, DPT [id number]     Room #:  9044/7629-M  Diagnosis:  Colitis  PMHx/PSHx:  SIMÓN, allergic rhinitis, chronic back pain, depression, DM, displacement of lumbar intervertebral disc, fibromyalgia, rib fx, head injury, HLD, HTN, obesity, OA, thoracic or lumbosacral radiculitis   Procedure/Surgery:  IR guided percutaneous cholecystostomy drain 8/3/20  Precautions:  Falls, Sacral decubitus ulcer, R adriano drain, R hand splint as needed, RUE sling, L eye visual deficits  Equipment Needs:  SPC    SUBJECTIVE:    Pt admitted from [place] [address]. States eventually his plan is to discharge home with one of his children. He is not sure of his exact plans yet. Pt had recent [facility] and was discharged to [facility]. States he is able to ambulate using SPC. OBJECTIVE:   Initial Evaluation  Date: 20 Treatment  20 Short Term/ Long Term   Goals   AM-PAC 6 Clicks /15 09/58    Was pt agreeable to Eval/treatment? Yes  Yes    Does pt have pain? Pain in sacral ulcer with mobility  No c/o pain     Bed Mobility  Rolling: SBA  Supine to sit: SBA  Sit to supine: SBA  Scooting: SBA Rolling: Supervision  Supine to sit: Supervision  Sit to supine: Supervision  Scooting: Supervision Rolling: Independent   Supine to sit: Independent   Sit to supine: Independent   Scooting: Independent    Transfers Sit to stand: SBA  Stand to sit: SBA  Stand pivot: Min A with [employer]  Sit to stand: Supervision  Stand to sit: Supervision  Stand pivot: Supervision with IV pole Sit to stand: Independent   Stand to sit:  Independent   Stand pivot: Modified Independent with SPC   Ambulation    20 feet x2 with [employer] Min A 200 feet with IV pole with SBA >75 feet with SPC Modified Independent     Stair negotiation: ascended and descended NT NT >2 steps with 1 rail SBA   ROM BUE:  Per OT eval  BLE:  WFL     Strength BUE:  Per OT eval   BLE:  5/5     Balance Sitting EOB:  SBA  Dynamic Standing:  Min A with [employer] Sitting EOB: Supervision  Dynamic Standing: SBA Sitting EOB:  Independent   Dynamic Standing:  Modified Independent       Pt is A & O x 4   Sensation:  Pt denies numbness and tingling to extremities  Edema:  None noted     Therapeutic Exercises:     Function, gait    Patient education  Pt educated on safety during functional mobility, transfers, gait    Patient response to education:   Pt verbalized understanding Pt demonstrated skill Pt requires further education in this area   Yes  Yes with verbal cues Reinforcement as needed      ASSESSMENT:  Comments:  Patient found in supine with son present. Patient able to get self to seated EOB without assist and denied dizziness with positional change. Patient able to mango sling to RUE and manage JULIO drain and telemetry unit, as well as put on slide on shoes. Patient assisted to standing and utilized IV pole for balance during transfers and gait. Patient with hills gait speed and equal stride length. Mild imbalance noted with head turns or with turning corners and patient reported due to visual deficits in L eye. Patient demonstrated good safety awareness during functional tasks. Patient assisted back to seated EOB, and then to supine after treatment. Call light and tray table in reach. Treatment:  Patient practiced and was instructed in the following treatment:     Bed mobility training - pt given verbal and tactile cues to facilitate proper sequencing and safety during rolling and supine>sit    STS and pivot transfer training - pt educated on proper hand and foot placement, safety and sequencing, and use of IV pole for balance   Gait training- pt was given verbal cues to facilitate safety/balance during ambulation    PLAN:  Pt is making good progress towards established goals. Continue PT POC. Time in  1345  Time out  1400    Total Treatment Time  15 minutes       CPT codes:  [] Gait training 78520 - minutes  [] Manual therapy 57342 - minutes  [x] Therapeutic activities 91920 15 minutes  [] Therapeutic exercises 51998 - minutes  [] Neuromuscular reeducation 32596 - minutes     Jessy Gonzalez PT, DPT  License [id number]

## 2020-08-11 NOTE — PROGRESS NOTES
Nurse to nurse called to [address]. All questions answered. [address] paperwork faxed. At their request, okay to pull the PICC.

## 2020-08-13 ENCOUNTER — TELEPHONE (OUTPATIENT)
Dept: INFUSION THERAPY | Age: 45
End: 2020-08-13

## 2020-08-13 LAB — JAK2 GENE MUTATION QUAL: NOT DETECTED

## 2020-08-13 NOTE — TELEPHONE ENCOUNTER
Referral received to follow up on concerns of transportation from SNF pt currently residing at for appointment with Dr. Isma Bell tomorrow. SW spoke with pt's nurse at [facility] who inquired if SW was calling from [facility]. SW explained she was not. Nurse states that pt is being seen at [facility]. He missed his appointment yesterday, but it was rescheduled and transport has already been arranged for his office visit there on the 17th per his preference. [facility] office staff notified that pt will not be attending appointment tomorrow and instead will be seen at [facility].   Dorcas Flores, MSW, LISW-S, OSW-C  Oncology Social Worker

## 2020-08-18 ENCOUNTER — HOSPITAL ENCOUNTER (OUTPATIENT)
Dept: GENERAL RADIOLOGY | Age: 45
Discharge: HOME | End: 2020-08-20
Payer: COMMERCIAL

## 2020-08-18 ENCOUNTER — TELEPHONE (OUTPATIENT)
Dept: GENERAL RADIOLOGY | Age: 45
End: 2020-08-18

## 2020-08-18 ENCOUNTER — HOSPITAL ENCOUNTER (OUTPATIENT)
Dept: CT IMAGING | Age: 45
Discharge: HOME | End: 2020-08-20
Payer: COMMERCIAL

## 2020-08-18 PROCEDURE — 76000 FLUOROSCOPY <1 HR PHYS/QHP: CPT

## 2020-08-18 PROCEDURE — 6360000004 HC RX CONTRAST MEDICATION: Performed by: RADIOLOGY

## 2020-08-18 RX ADMIN — IOPAMIDOL 3 ML: 612 INJECTION, SOLUTION INTRAVENOUS at 12:45

## 2020-08-18 NOTE — TELEPHONE ENCOUNTER
I called the patient's insurance [facility] and I spoke with Britta JIMÉNEZ authorization rep., she stated no authorization is required for CPT code 31563.         Electronically signed by Andressa Moffett on 8/18/20 at 11:33 AM EDT

## 2020-08-18 NOTE — PROGRESS NOTES
Patient was identified by 2 identifiers and taken to CT scan for scheduled RUQ abdominal tube check. The procedure was reviewed, all questions were answered, and the patient verbalized understanding. Past medical history, medications, and allergies were reviewed. Patient denies fever, chills and discomfort from the insertion site. There is some redness and scabbing around the insertion site. Upon arrival, the patient's drainage dressing reinforced with tape and has a small amount of drainage in the center of the dressing. The patient's drain log shows anywhere from 450-600 mL of bile-looking drainage DAILY. The RUQ insertion site was imaged. 1223 - Time out was completed with the patient, Yenny House RT, Clive Michel RT, and Stephanie Whitten RN in the room. 1224 - Approximately 5 mL of contrast was injected into the drain and more images were obtained and reviewed by Dr Chavo Ocampo. Appointment for follow-up drain check was given for 4 weeks to recheck the patency of the drain. This appointment was marked on the tube check sheet and given to unit secretary to schedule with Dr. Devi Braden. Handoff report given to FADY Cano RN.

## 2020-08-18 NOTE — PROGRESS NOTES
Patient in Recovery post CT scan. Handoff report given to me by Ricardo Tran RN. Patient's images were reviewed by Dr. Lakeisha Nathan who would like the patient to follow up with the surgeon, ordering physician is Dr. Lizzie Hernandez, regarding proceeding with treatment for his gallbladder. In the meantime, the patient is to follow up with IR for another drain check in 4 weeks and an appointment is given to the patient. Patient is to continue flushing the drain with 10 mL 0.9% Normal Saline every 12 hours and keep a drain log. Discharge instructions were reviewed and the patient was transferred to [facility] with [facility] to be transported home. All questions were answered and patient states understanding. Patient left IR in stable condition. Report was called to the patient's nursing home, [facility] at [phone]. Loc Nova RN.

## 2020-08-19 LAB
Lab: NORMAL
REPORT: NORMAL
THIS TEST SENT TO: NORMAL

## 2020-09-03 ENCOUNTER — HOSPITAL ENCOUNTER (INPATIENT)
Age: 45
LOS: 8 days | Discharge: NURSING, REHAB OR LONG-TERM CARE | DRG: 241 | End: 2020-09-11
Attending: EMERGENCY MEDICINE | Admitting: INTERNAL MEDICINE
Payer: COMMERCIAL

## 2020-09-03 ENCOUNTER — ANESTHESIA EVENT (OUTPATIENT)
Dept: ENDOSCOPY | Age: 45
DRG: 241 | End: 2020-09-03
Payer: COMMERCIAL

## 2020-09-03 PROBLEM — K92.2 GI BLEED: Status: ACTIVE | Noted: 2020-09-03

## 2020-09-03 LAB
ABO/RH: NORMAL
ALBUMIN SERPL-MCNC: 2.7 G/DL (ref 3.5–5.2)
ALP BLD-CCNC: 104 U/L (ref 40–129)
ALT SERPL-CCNC: 8 U/L (ref 0–40)
ANION GAP SERPL CALCULATED.3IONS-SCNC: 12 MMOL/L (ref 7–16)
ANTIBODY SCREEN: NORMAL
APTT: 40.9 SEC (ref 24.5–35.1)
AST SERPL-CCNC: 10 U/L (ref 0–39)
BASOPHILS ABSOLUTE: 0.05 E9/L (ref 0–0.2)
BASOPHILS RELATIVE PERCENT: 0.5 % (ref 0–2)
BILIRUB SERPL-MCNC: <0.2 MG/DL (ref 0–1.2)
BUN BLDV-MCNC: 21 MG/DL (ref 6–20)
CALCIUM SERPL-MCNC: 8.7 MG/DL (ref 8.6–10.2)
CHLORIDE BLD-SCNC: 106 MMOL/L (ref 98–107)
CO2: 23 MMOL/L (ref 22–29)
CREAT SERPL-MCNC: 1 MG/DL (ref 0.7–1.2)
EOSINOPHILS ABSOLUTE: 0.46 E9/L (ref 0.05–0.5)
EOSINOPHILS RELATIVE PERCENT: 5 % (ref 0–6)
GFR AFRICAN AMERICAN: >60
GFR NON-AFRICAN AMERICAN: >60 ML/MIN/1.73
GLUCOSE BLD-MCNC: 111 MG/DL (ref 74–99)
HCT VFR BLD CALC: 25.3 % (ref 37–54)
HCT VFR BLD CALC: 27.5 % (ref 37–54)
HEMOGLOBIN: 7.8 G/DL (ref 12.5–16.5)
HEMOGLOBIN: 8.4 G/DL (ref 12.5–16.5)
IMMATURE GRANULOCYTES #: 0.08 E9/L
IMMATURE GRANULOCYTES %: 0.9 % (ref 0–5)
INR BLD: 1.2
LYMPHOCYTES ABSOLUTE: 3.12 E9/L (ref 1.5–4)
LYMPHOCYTES RELATIVE PERCENT: 33.9 % (ref 20–42)
MCH RBC QN AUTO: 27.3 PG (ref 26–35)
MCHC RBC AUTO-ENTMCNC: 30.5 % (ref 32–34.5)
MCV RBC AUTO: 89.3 FL (ref 80–99.9)
METER GLUCOSE: 123 MG/DL (ref 74–99)
METER GLUCOSE: 150 MG/DL (ref 74–99)
METER GLUCOSE: 90 MG/DL (ref 74–99)
MONOCYTES ABSOLUTE: 1.25 E9/L (ref 0.1–0.95)
MONOCYTES RELATIVE PERCENT: 13.6 % (ref 2–12)
NEUTROPHILS ABSOLUTE: 4.23 E9/L (ref 1.8–7.3)
NEUTROPHILS RELATIVE PERCENT: 46.1 % (ref 43–80)
PDW BLD-RTO: 17.2 FL (ref 11.5–15)
PLATELET # BLD: 951 E9/L (ref 130–450)
PMV BLD AUTO: 8.4 FL (ref 7–12)
POTASSIUM SERPL-SCNC: 4.1 MMOL/L (ref 3.5–5)
PROTHROMBIN TIME: 13.3 SEC (ref 9.3–12.4)
RBC # BLD: 3.08 E12/L (ref 3.8–5.8)
SODIUM BLD-SCNC: 141 MMOL/L (ref 132–146)
TOTAL PROTEIN: 5.6 G/DL (ref 6.4–8.3)
WBC # BLD: 9.2 E9/L (ref 4.5–11.5)

## 2020-09-03 PROCEDURE — C9113 INJ PANTOPRAZOLE SODIUM, VIA: HCPCS | Performed by: EMERGENCY MEDICINE

## 2020-09-03 PROCEDURE — C9113 INJ PANTOPRAZOLE SODIUM, VIA: HCPCS | Performed by: CLINICAL NURSE SPECIALIST

## 2020-09-03 PROCEDURE — 85018 HEMOGLOBIN: CPT

## 2020-09-03 PROCEDURE — 85014 HEMATOCRIT: CPT

## 2020-09-03 PROCEDURE — 96374 THER/PROPH/DIAG INJ IV PUSH: CPT

## 2020-09-03 PROCEDURE — 6360000002 HC RX W HCPCS: Performed by: CLINICAL NURSE SPECIALIST

## 2020-09-03 PROCEDURE — 2580000003 HC RX 258: Performed by: CLINICAL NURSE SPECIALIST

## 2020-09-03 PROCEDURE — 82962 GLUCOSE BLOOD TEST: CPT

## 2020-09-03 PROCEDURE — 85730 THROMBOPLASTIN TIME PARTIAL: CPT

## 2020-09-03 PROCEDURE — 85610 PROTHROMBIN TIME: CPT

## 2020-09-03 PROCEDURE — 99284 EMERGENCY DEPT VISIT MOD MDM: CPT

## 2020-09-03 PROCEDURE — 6360000002 HC RX W HCPCS: Performed by: EMERGENCY MEDICINE

## 2020-09-03 PROCEDURE — 86850 RBC ANTIBODY SCREEN: CPT

## 2020-09-03 PROCEDURE — 2060000000 HC ICU INTERMEDIATE R&B

## 2020-09-03 PROCEDURE — 80053 COMPREHEN METABOLIC PANEL: CPT

## 2020-09-03 PROCEDURE — 99285 EMERGENCY DEPT VISIT HI MDM: CPT

## 2020-09-03 PROCEDURE — 97165 OT EVAL LOW COMPLEX 30 MIN: CPT

## 2020-09-03 PROCEDURE — 97530 THERAPEUTIC ACTIVITIES: CPT

## 2020-09-03 PROCEDURE — 6370000000 HC RX 637 (ALT 250 FOR IP): Performed by: CLINICAL NURSE SPECIALIST

## 2020-09-03 PROCEDURE — 86901 BLOOD TYPING SEROLOGIC RH(D): CPT

## 2020-09-03 PROCEDURE — 6370000000 HC RX 637 (ALT 250 FOR IP): Performed by: INTERNAL MEDICINE

## 2020-09-03 PROCEDURE — 86900 BLOOD TYPING SEROLOGIC ABO: CPT

## 2020-09-03 PROCEDURE — 2580000003 HC RX 258: Performed by: INTERNAL MEDICINE

## 2020-09-03 PROCEDURE — 36415 COLL VENOUS BLD VENIPUNCTURE: CPT

## 2020-09-03 PROCEDURE — 99223 1ST HOSP IP/OBS HIGH 75: CPT | Performed by: SURGERY

## 2020-09-03 PROCEDURE — 85025 COMPLETE CBC W/AUTO DIFF WBC: CPT

## 2020-09-03 RX ORDER — HYDROXYUREA 500 MG/1
500 CAPSULE ORAL 2 TIMES DAILY
Status: DISCONTINUED | OUTPATIENT
Start: 2020-09-03 | End: 2020-09-11 | Stop reason: HOSPADM

## 2020-09-03 RX ORDER — DEXTROSE MONOHYDRATE 25 G/50ML
12.5 INJECTION, SOLUTION INTRAVENOUS PRN
Status: DISCONTINUED | OUTPATIENT
Start: 2020-09-03 | End: 2020-09-11 | Stop reason: HOSPADM

## 2020-09-03 RX ORDER — ONDANSETRON 2 MG/ML
4 INJECTION INTRAMUSCULAR; INTRAVENOUS EVERY 6 HOURS PRN
Status: DISCONTINUED | OUTPATIENT
Start: 2020-09-03 | End: 2020-09-11 | Stop reason: HOSPADM

## 2020-09-03 RX ORDER — ACETAMINOPHEN 650 MG/1
650 SUPPOSITORY RECTAL EVERY 6 HOURS PRN
Status: DISCONTINUED | OUTPATIENT
Start: 2020-09-03 | End: 2020-09-11 | Stop reason: HOSPADM

## 2020-09-03 RX ORDER — SODIUM CHLORIDE 9 MG/ML
10 INJECTION INTRAVENOUS 2 TIMES DAILY
Status: DISCONTINUED | OUTPATIENT
Start: 2020-09-03 | End: 2020-09-11 | Stop reason: HOSPADM

## 2020-09-03 RX ORDER — PRAVASTATIN SODIUM 20 MG
20 TABLET ORAL NIGHTLY
Status: DISCONTINUED | OUTPATIENT
Start: 2020-09-03 | End: 2020-09-11 | Stop reason: HOSPADM

## 2020-09-03 RX ORDER — ACETAMINOPHEN 325 MG/1
650 TABLET ORAL EVERY 6 HOURS PRN
Status: DISCONTINUED | OUTPATIENT
Start: 2020-09-03 | End: 2020-09-11 | Stop reason: HOSPADM

## 2020-09-03 RX ORDER — PREGABALIN 300 MG/1
300 CAPSULE ORAL 2 TIMES DAILY
Status: ON HOLD | COMMUNITY
End: 2020-09-26 | Stop reason: HOSPADM

## 2020-09-03 RX ORDER — 0.9 % SODIUM CHLORIDE 0.9 %
250 INTRAVENOUS SOLUTION INTRAVENOUS ONCE
Status: DISCONTINUED | OUTPATIENT
Start: 2020-09-03 | End: 2020-09-03

## 2020-09-03 RX ORDER — OXYCODONE HYDROCHLORIDE AND ACETAMINOPHEN 5; 325 MG/1; MG/1
1 TABLET ORAL EVERY 6 HOURS PRN
Status: DISCONTINUED | OUTPATIENT
Start: 2020-09-03 | End: 2020-09-08

## 2020-09-03 RX ORDER — CYCLOBENZAPRINE HCL 10 MG
10 TABLET ORAL 3 TIMES DAILY
COMMUNITY
End: 2021-10-08

## 2020-09-03 RX ORDER — SODIUM CHLORIDE 0.9 % (FLUSH) 0.9 %
10 SYRINGE (ML) INJECTION EVERY 12 HOURS SCHEDULED
Status: DISCONTINUED | OUTPATIENT
Start: 2020-09-03 | End: 2020-09-11 | Stop reason: HOSPADM

## 2020-09-03 RX ORDER — LANOLIN ALCOHOL/MO/W.PET/CERES
3 CREAM (GRAM) TOPICAL NIGHTLY PRN
COMMUNITY
End: 2022-05-11

## 2020-09-03 RX ORDER — NYSTATIN 100000 [USP'U]/G
POWDER TOPICAL 2 TIMES DAILY
COMMUNITY
End: 2020-10-07

## 2020-09-03 RX ORDER — PANTOPRAZOLE SODIUM 40 MG/10ML
80 INJECTION, POWDER, LYOPHILIZED, FOR SOLUTION INTRAVENOUS ONCE
Status: COMPLETED | OUTPATIENT
Start: 2020-09-03 | End: 2020-09-03

## 2020-09-03 RX ORDER — SODIUM CHLORIDE 9 MG/ML
INJECTION, SOLUTION INTRAVENOUS CONTINUOUS
Status: DISCONTINUED | OUTPATIENT
Start: 2020-09-04 | End: 2020-09-09

## 2020-09-03 RX ORDER — FLUTICASONE PROPIONATE 50 MCG
2 SPRAY, SUSPENSION (ML) NASAL DAILY
Status: DISCONTINUED | OUTPATIENT
Start: 2020-09-03 | End: 2020-09-11 | Stop reason: HOSPADM

## 2020-09-03 RX ORDER — POLYETHYLENE GLYCOL 3350 17 G/17G
17 POWDER, FOR SOLUTION ORAL DAILY PRN
Status: DISCONTINUED | OUTPATIENT
Start: 2020-09-03 | End: 2020-09-11 | Stop reason: HOSPADM

## 2020-09-03 RX ORDER — CYCLOBENZAPRINE HCL 10 MG
10 TABLET ORAL 3 TIMES DAILY
Status: DISCONTINUED | OUTPATIENT
Start: 2020-09-03 | End: 2020-09-11 | Stop reason: HOSPADM

## 2020-09-03 RX ORDER — FERROUS SULFATE 325(65) MG
325 TABLET ORAL
COMMUNITY
End: 2021-09-20

## 2020-09-03 RX ORDER — INSULIN GLARGINE 100 [IU]/ML
20 INJECTION, SOLUTION SUBCUTANEOUS NIGHTLY
Status: DISCONTINUED | OUTPATIENT
Start: 2020-09-03 | End: 2020-09-04

## 2020-09-03 RX ORDER — PANTOPRAZOLE SODIUM 40 MG/10ML
40 INJECTION, POWDER, LYOPHILIZED, FOR SOLUTION INTRAVENOUS 2 TIMES DAILY
Status: DISCONTINUED | OUTPATIENT
Start: 2020-09-03 | End: 2020-09-11 | Stop reason: HOSPADM

## 2020-09-03 RX ORDER — LANOLIN ALCOHOL/MO/W.PET/CERES
3 CREAM (GRAM) TOPICAL NIGHTLY PRN
Status: DISCONTINUED | OUTPATIENT
Start: 2020-09-03 | End: 2020-09-11 | Stop reason: HOSPADM

## 2020-09-03 RX ORDER — ASPIRIN 81 MG/1
81 TABLET, CHEWABLE ORAL DAILY
Status: ON HOLD | COMMUNITY
End: 2020-09-11 | Stop reason: HOSPADM

## 2020-09-03 RX ORDER — DEXTROSE MONOHYDRATE 50 MG/ML
100 INJECTION, SOLUTION INTRAVENOUS PRN
Status: DISCONTINUED | OUTPATIENT
Start: 2020-09-03 | End: 2020-09-11 | Stop reason: HOSPADM

## 2020-09-03 RX ORDER — LISINOPRIL 20 MG/1
20 TABLET ORAL DAILY
Status: DISCONTINUED | OUTPATIENT
Start: 2020-09-03 | End: 2020-09-03

## 2020-09-03 RX ORDER — LISINOPRIL 20 MG/1
20 TABLET ORAL DAILY
Status: ON HOLD | COMMUNITY
End: 2020-09-11 | Stop reason: HOSPADM

## 2020-09-03 RX ORDER — FENOFIBRATE 54 MG/1
54 TABLET ORAL DAILY
COMMUNITY
End: 2021-09-20

## 2020-09-03 RX ORDER — ASPIRIN 81 MG/1
81 TABLET ORAL 2 TIMES DAILY
Status: ON HOLD | COMMUNITY
End: 2020-09-11 | Stop reason: HOSPADM

## 2020-09-03 RX ORDER — FLUTICASONE PROPIONATE 50 MCG
2 SPRAY, SUSPENSION (ML) NASAL DAILY
COMMUNITY
End: 2020-11-30

## 2020-09-03 RX ORDER — NICOTINE POLACRILEX 4 MG
15 LOZENGE BUCCAL PRN
Status: DISCONTINUED | OUTPATIENT
Start: 2020-09-03 | End: 2020-09-11 | Stop reason: HOSPADM

## 2020-09-03 RX ORDER — FERROUS SULFATE 325(65) MG
325 TABLET ORAL 2 TIMES DAILY
Status: DISCONTINUED | OUTPATIENT
Start: 2020-09-03 | End: 2020-09-11 | Stop reason: HOSPADM

## 2020-09-03 RX ORDER — SODIUM CHLORIDE 0.9 % (FLUSH) 0.9 %
10 SYRINGE (ML) INJECTION PRN
Status: DISCONTINUED | OUTPATIENT
Start: 2020-09-03 | End: 2020-09-11 | Stop reason: HOSPADM

## 2020-09-03 RX ORDER — LOPERAMIDE HYDROCHLORIDE 2 MG/1
2 CAPSULE ORAL 4 TIMES DAILY PRN
COMMUNITY
End: 2021-04-19 | Stop reason: SDUPTHER

## 2020-09-03 RX ORDER — PROMETHAZINE HYDROCHLORIDE 25 MG/1
12.5 TABLET ORAL EVERY 6 HOURS PRN
Status: DISCONTINUED | OUTPATIENT
Start: 2020-09-03 | End: 2020-09-11 | Stop reason: HOSPADM

## 2020-09-03 RX ORDER — PREGABALIN 150 MG/1
300 CAPSULE ORAL 2 TIMES DAILY
Status: DISCONTINUED | OUTPATIENT
Start: 2020-09-03 | End: 2020-09-11 | Stop reason: HOSPADM

## 2020-09-03 RX ORDER — IBUPROFEN 600 MG/1
600 TABLET ORAL EVERY 6 HOURS PRN
Status: ON HOLD | COMMUNITY
End: 2020-09-11 | Stop reason: HOSPADM

## 2020-09-03 RX ORDER — DULOXETIN HYDROCHLORIDE 30 MG/1
30 CAPSULE, DELAYED RELEASE ORAL DAILY
Status: DISCONTINUED | OUTPATIENT
Start: 2020-09-03 | End: 2020-09-11 | Stop reason: HOSPADM

## 2020-09-03 RX ORDER — POLYETHYLENE GLYCOL 3350, SODIUM CHLORIDE, POTASSIUM CHLORIDE, SODIUM BICARBONATE, AND SODIUM SULFATE 240; 5.84; 2.98; 6.72; 22.72 G/4L; G/4L; G/4L; G/4L; G/4L
4000 POWDER, FOR SOLUTION ORAL ONCE
Status: DISCONTINUED | OUTPATIENT
Start: 2020-09-03 | End: 2020-09-03

## 2020-09-03 RX ORDER — SODIUM CHLORIDE 9 MG/ML
INJECTION, SOLUTION INTRAVENOUS ONCE
Status: COMPLETED | OUTPATIENT
Start: 2020-09-03 | End: 2020-09-03

## 2020-09-03 RX ORDER — FENOFIBRATE 54 MG/1
54 TABLET ORAL DAILY
Status: DISCONTINUED | OUTPATIENT
Start: 2020-09-03 | End: 2020-09-11 | Stop reason: HOSPADM

## 2020-09-03 RX ORDER — HYDROXYUREA 500 MG/1
500 CAPSULE ORAL 2 TIMES DAILY
COMMUNITY
End: 2022-05-11

## 2020-09-03 RX ADMIN — INSULIN LISPRO 1 UNITS: 100 INJECTION, SOLUTION INTRAVENOUS; SUBCUTANEOUS at 20:03

## 2020-09-03 RX ADMIN — OXYCODONE AND ACETAMINOPHEN 1 TABLET: 5; 325 TABLET ORAL at 19:38

## 2020-09-03 RX ADMIN — SODIUM CHLORIDE, PRESERVATIVE FREE 10 ML: 5 INJECTION INTRAVENOUS at 12:56

## 2020-09-03 RX ADMIN — PRAVASTATIN SODIUM 20 MG: 20 TABLET ORAL at 20:02

## 2020-09-03 RX ADMIN — FLUTICASONE PROPIONATE 2 SPRAY: 50 SPRAY, METERED NASAL at 12:54

## 2020-09-03 RX ADMIN — CYCLOBENZAPRINE 10 MG: 10 TABLET, FILM COATED ORAL at 20:02

## 2020-09-03 RX ADMIN — HYDROXYUREA 500 MG: 500 CAPSULE ORAL at 14:19

## 2020-09-03 RX ADMIN — PANTOPRAZOLE SODIUM 40 MG: 40 INJECTION, POWDER, FOR SOLUTION INTRAVENOUS at 19:58

## 2020-09-03 RX ADMIN — PREGABALIN 300 MG: 150 CAPSULE ORAL at 12:58

## 2020-09-03 RX ADMIN — PREGABALIN 300 MG: 150 CAPSULE ORAL at 20:04

## 2020-09-03 RX ADMIN — SODIUM CHLORIDE, PRESERVATIVE FREE 10 ML: 5 INJECTION INTRAVENOUS at 20:06

## 2020-09-03 RX ADMIN — PANTOPRAZOLE SODIUM 80 MG: 40 INJECTION, POWDER, FOR SOLUTION INTRAVENOUS at 10:25

## 2020-09-03 RX ADMIN — CYCLOBENZAPRINE 10 MG: 10 TABLET, FILM COATED ORAL at 12:58

## 2020-09-03 RX ADMIN — PANTOPRAZOLE SODIUM 40 MG: 40 INJECTION, POWDER, FOR SOLUTION INTRAVENOUS at 12:56

## 2020-09-03 RX ADMIN — HYDROXYUREA 500 MG: 500 CAPSULE ORAL at 19:56

## 2020-09-03 RX ADMIN — SODIUM CHLORIDE: 9 INJECTION, SOLUTION INTRAVENOUS at 17:00

## 2020-09-03 RX ADMIN — DULOXETINE HYDROCHLORIDE 30 MG: 30 CAPSULE, DELAYED RELEASE ORAL at 12:58

## 2020-09-03 RX ADMIN — OXYCODONE AND ACETAMINOPHEN 1 TABLET: 5; 325 TABLET ORAL at 12:45

## 2020-09-03 RX ADMIN — INSULIN GLARGINE 20 UNITS: 100 INJECTION, SOLUTION SUBCUTANEOUS at 20:02

## 2020-09-03 RX ADMIN — FERROUS SULFATE TAB 325 MG (65 MG ELEMENTAL FE) 325 MG: 325 (65 FE) TAB at 20:02

## 2020-09-03 RX ADMIN — FENOFIBRATE 54 MG: 54 TABLET ORAL at 14:19

## 2020-09-03 ASSESSMENT — PAIN SCALES - GENERAL
PAINLEVEL_OUTOF10: 10
PAINLEVEL_OUTOF10: 10
PAINLEVEL_OUTOF10: 4
PAINLEVEL_OUTOF10: 10

## 2020-09-03 ASSESSMENT — PAIN DESCRIPTION - PROGRESSION: CLINICAL_PROGRESSION: GRADUALLY WORSENING

## 2020-09-03 ASSESSMENT — PAIN DESCRIPTION - FREQUENCY: FREQUENCY: CONTINUOUS

## 2020-09-03 ASSESSMENT — PAIN DESCRIPTION - ORIENTATION: ORIENTATION: RIGHT;LEFT;MID

## 2020-09-03 ASSESSMENT — PAIN DESCRIPTION - LOCATION: LOCATION: BACK;LEG

## 2020-09-03 ASSESSMENT — PAIN DESCRIPTION - ONSET: ONSET: ON-GOING

## 2020-09-03 ASSESSMENT — PAIN DESCRIPTION - PAIN TYPE: TYPE: CHRONIC PAIN

## 2020-09-03 ASSESSMENT — PAIN - FUNCTIONAL ASSESSMENT: PAIN_FUNCTIONAL_ASSESSMENT: PREVENTS OR INTERFERES SOME ACTIVE ACTIVITIES AND ADLS

## 2020-09-03 ASSESSMENT — PAIN DESCRIPTION - DESCRIPTORS: DESCRIPTORS: ACHING;DISCOMFORT;DULL

## 2020-09-03 NOTE — PROGRESS NOTES
Occupational Therapy  OCCUPATIONAL THERAPY INITIAL EVALUATION      Date:9/3/2020  Patient Name: Vera Patel  MRN: [id number]  : 1975  Room: [address] [place] [address]    Evaluating OT: Shahab Powers OTR/RAQUEL #[id number]    Referring physician: IGOR Irving  AM-PAC Daily Activity Raw Score:   Recommended Adaptive Equipment: TBD    Diagnosis: low HgB, GI bleed  Surgery:  Neck surgery (); shoulder surgery ( &); Wrist surgery (); Rotator cuff repair (Left, ); hernia repair (); Shoulder arthroscopy (Left, 14); Vasectomy; Hip Arthroplasty (Left, 2016); Total hip arthroplasty (Right, 10/31/2016); Foot surgery (Right, ); pr colonoscopy flx dx w/collj spec when pfrmd (N/A, 2018); hernia repair (Right, 2019)  Pertinent Medical History:  PE, SIMÓN, Chronic back pain, Depression, Diabetes mellitus, Displacement of lumbar intervertebral disc without myelopathy, Fibromyalgia, Fractured rib, Head injury, Hyperlipidemia, Hypertension,Osteoarthritis, and Thoracic or lumbosacral neuritis or radiculitis, coccyx wound  Precautions:  Falls, per patient report, patient is R UE NWB with sling,tele, drain     Home Living: Patient currently residing at  [facility]. Patient unable to provide additional details regarding if patient is long term resident or prior living situation. Prior Level of Function:  Patient reports he is I/mod I with ADLs at baseline. Patient uses a straight cane for functional mobility. Patient reports receiving skilled therapy services at [facility] prior to this admission.      Pain Level: 7/10 back pain  Cognition: A&O: 3/3  Follows multi- step directions   Memory:  fair    Sequencing:  good    Problem solving:  fair    Judgement/safety:  fair      Functional Assessment:   Initial Eval Status  Date: 9/3/20 Treatment Status  Date: Short Term Goals=LTG  Treatment frequency: PRN 1-3 x/week   Feeding Set-up  Mod I   Grooming Supervision   Mod I   UB Dressing Treatment: OT treatment provided this date includes:    ADL training-  Instruction/training on safety and adapted techniques for completion of ADLs: LB dressing to don shoes, min A overall for UB dressing with management of R UE sling    Mobility training-  Instruction/training on safety and improved independence with functional transfers  and functional mobility using straight cane with supervision/ CGA overall and verbal cues for safety.   Activity tolerance- Instruction/training on energy conservation/work simplification for completion of ADLs: Education provided regarding safety and energy conservation techniques with ADLs    Skilled positioning/alignment-  Proper Positioning/Alignment     Skilled monitoring of O2 sats-  Skilled monitoring of HR, and pt response throughout treatment. Patient would  benefit from continued skilled OT to maximize functional outcomes as they relate to I/ADLs and functional mobility.      Eval Complexity: Low    Assessment of current deficits  Functional mobility [x]  ADLs [x] Strength [x]  Cognition []  Functional transfers  [x] IADLs [x] Safety Awareness [x]  Endurance [x]  Fine Motor Coordination [] Balance [x] Vision/perception [] Sensation []   Gross Motor Coordination [] ROM [] Delirium []                  Motor Control []    Plan of Care:  OT 1-3x/week for 5-7 days PRN   [x] ADL retraining/AE recommendations   [x] Energy Conservation Techniques/Strategies     [] Neuromuscular Re-Education     [x] Functional Transfer Training         [x] Functional Mobility Training         [] Cognitive Re-Training         [x] Splinting/Positioning Needs          [x] Therapeutic Activity  [x]Therapeutic Exercise   [] Visual/Perceptual   [] Delirium Prevention/Treatment   [x] Positioning to Improve Functional [place], Safety, and Skin Integrity  [x] Patient and/or Family Education to Increase Safety and Functional [place]  [] Other: *    Rehab Potential: Good for established goals    Patient / Family Goal: To return to PLOF     Evaluation time includes thorough review of current medical information, gathering information on past medical & social history & PLOF, completion of standardized testing, informal observation of tasks, consultation with other medical professions/disciplines, assessment of data & development of POC/goals. Patient and/or family were instructed diagnosis, prognosis/goals and plan of care. yes Demonstrated good understanding. Treatment Time In:14:02            Treatment Time Out: 14:12           Treatment Charges: Mins Units   Ther Ex  04664     Manual Therapy 47563     Thera Activities 64920 12 1   ADL/Home Mgt 21081     Neuro Re-ed 07398     Group Therapy      Orthotic manage/training  90313     Non-Billable Time     Total Timed Treatment 12 1     [] Malnutrition indicators have been identified and nursing has been notified to ensure a dietitian consult is ordered.      Low OT Evaluation + 10  treatment minutes    Cherise Phillips OTR/L #[id number]

## 2020-09-03 NOTE — PROGRESS NOTES
Patient admitted from [address] [place] [address] with low hemoglobin. Patient has a coccyx wound with a wound vac from the nursing home, also two buttocks wounds that are healing. Wound vac removed, wet to dry dressing applied to coccyx and wound care nurse consulted. He is able to ambulate with a cane. He reports his right arm being completely flaccid. He is alert and oriented x4. He reports 10/10 pain to his coccyx, he takes percocet at the nursing facility for pain. BGM 90 for lunchtime. He has healing pink spots to the back of his neck and the back of his head. States he was in the hospital at [facility] for 3 weeks before going to [address] [place] [address]. Surgery is consulted for EGD tomorrow as hemmoccult was positive. Consent signed for procedure tomorrow and placed in paper chart.

## 2020-09-03 NOTE — H&P
Hospital Medicine History & Physical      PCP: IGOR Gray - CNP    Date of Admission: 9/3/2020    Date of Service: Pt seen/examined on 9/3/20 and Admitted to Inpatient with expected LOS greater than two midnights due to medical therapy. Chief Complaint:  Weakness, abnormal labs      History Of Present Illness:      53 y.o. male who presented to [address] [place] [address] with weakness and abnormal labs. History obtained from the patient. He states he had bloodwork completed yesterday for petechiae on his hands. He found out this morning that his blood count was low and sen to the ED for evaluation from the nursing facility. He states he has persistent pain at the site of his sacral wound. He has had weakness intermittently for the past two weeks. In the ED, his Hgb was found to be 8.4, platelets 270. He was also found to be hemoccult positive. He has PMH of SIMÓN, depression, DM, HLD, HTN, splenic abscess.     Past Medical History:          Diagnosis Date    Accident 11/2019    stepped on nail rt ft about 1 month ago- healed per patient    SIMÓN (acute kidney injury) ([address] 2008 apx    kidney bruised due to fall / Sharlotte Closs    Allergic rhinitis     Chronic back pain     Depression     Diabetes mellitus ([address]     Difficulty sleeping     at times    Displacement of lumbar intervertebral disc without myelopathy     Fibromyalgia     Fractured rib     2008 / healed    H/O seasonal allergies     Head injury 1980'S apx    no residual s/s    Hyperlipidemia     Hypertension     Obesity (BMI 35.0-39.9 without comorbidity)     bmi 39.2  weight 296 #    Osteoarthritis     Thoracic or lumbosacral neuritis or radiculitis, unspecified        Past Surgical History:          Procedure Laterality Date    CT PTC NEW ACCESS  8/3/2020    CT PTC NEW ACCESS 8/3/2020 [facility] CT    FOOT SURGERY Right 1985    to treat shattered bones    HERNIA REPAIR  2001    DOUBLE HERNIA    HERNIA REPAIR Right mg by mouth 4 times daily as needed for Diarrhea   Yes Historical Provider, MD   nystatin (MYCOSTATIN) 222580 UNIT/GM powder Apply topically 2 times daily   Yes Historical Provider, MD   insulin lispro (HUMALOG) 100 UNIT/ML injection vial Inject into the skin 3 times daily (before meals) *Plus Sliding Scale*   Yes Historical Provider, MD   apixaban (ELIQUIS) 5 MG TABS tablet Take 1 tablet by mouth 2 times daily 8/11/20  Yes Laverne Rich MD   enoxaparin (LOVENOX) 120 MG/0.8ML injection Inject 120 mg into the skin every 12 hours    Yes Historical Provider, MD   insulin glargine (LANTUS) 100 UNIT/ML injection vial Inject 20 Units into the skin nightly   Yes Historical Provider, MD   oxyCODONE-acetaminophen (PERCOCET) 5-325 MG per tablet Take 1 tablet by mouth every 6 hours as needed for Pain. Yes Historical Provider, MD   pantoprazole (PROTONIX) 40 MG tablet Take 40 mg by mouth daily   Yes Historical Provider, MD   DULoxetine (CYMBALTA) 30 MG extended release capsule Take 1 capsule by mouth daily 5/14/20  Yes IGOR Gray CNP   pravastatin (PRAVACHOL) 20 MG tablet Take 1 tablet by mouth nightly 5/14/20  Yes IGOR Gray CNP       Allergies:  Seasonal and Tramadol    Social History:      The patient [facility] at [facility]:   reports that he has never smoked. His smokeless tobacco use includes chew. ETOH:   reports previous alcohol use. Family History:      Positive as follows:        Problem Relation Age of Onset    Hypertension Mother     Arthritis Father     Diabetes Father     Cancer Maternal Grandfather         Skin    Cancer Paternal Uncle         skin    Diabetes Paternal Grandfather     Heart Disease Maternal Grandmother     Stroke Maternal Aunt        REVIEW OF SYSTEMS:   Pertinent positives as noted in the HPI. All other systems reviewed and negative.     PHYSICAL EXAM:    BP (!) 97/51   Pulse 104   Temp 96.5 °F (35.8 °C) (Temporal)   Resp 18   Ht 6' (1.829 m)   Wt 240 lb (108.9 kg)   SpO2 95%   BMI 32.55 kg/m²     General appearance:  No apparent distress, appears stated age and cooperative. HEENT:  Normal cephalic, atraumatic without obvious deformity. Pupils equal, round, and reactive to light. Extra ocular muscles intact. Conjunctivae/corneas clear. Neck: Supple, with full range of motion. No jugular venous distention. Trachea midline. Respiratory:  Normal respiratory effort. Clear to auscultation, bilaterally without Rales/Wheezes/Rhonchi. Cardiovascular:  Regular rate and rhythm with normal S1/S2 without murmurs, rubs or gallops. Abdomen: Soft, non-tender, non-distended with normal bowel sounds. Musculoskeletal:  No clubbing, cyanosis or edema bilaterally. Flaccid right wrist.  Skin: Skin color, texture, turgor normal.  Sacral wound. JULIO drain  Neurologic:  Neurovascularly intact without any focal sensory/motor deficits. Psychiatric:  Alert and oriented, thought content appropriate, normal insight  Capillary Refill: Brisk,< 3 seconds   Peripheral Pulses: +2 palpable, equal bilaterally         Labs:     Recent Labs     09/03/20  0827   WBC 9.2   HGB 8.4*   HCT 27.5*   *     Recent Labs     09/03/20  0827      K 4.1      CO2 23   BUN 21*   CREATININE 1.0   CALCIUM 8.7     Recent Labs     09/03/20  0827   AST 10   ALT 8   BILITOT <0.2   ALKPHOS 104     Recent Labs     09/03/20  1108   INR 1.2     No results for input(s): Bety Boogie in the last 72 hours.     Urinalysis:      Lab Results   Component Value Date    NITRU Negative 08/01/2020    BLOODU Negative 08/01/2020    SPECGRAV 1.010 08/01/2020    GLUCOSEU Negative 08/01/2020       No orders to display       ASSESSMENT:    Active Hospital Problems    Diagnosis Date Noted    GI bleed [K92.2] 09/03/2020    Thrombocytosis ([facility] [D47.3] 08/10/2020    Type 2 diabetes mellitus ([facility] [E11.9] 04/08/2016    Class 1 obesity in adult [E66.9] 12/17/2015    Essential hypertension stool has been greenish-black. In the ED: In the ED: Albumin 2.7, hemoglobin 8.4, hematocrit 27.5, platelet count 149, and patient was tested positive for stool guaiac. General suspected to have possible upper GI bleeding/PUD. Patient was seen by surgical team.Started on PPI BID. H/H q8. NPO after midnight. Patient scheduled for EGD on 09/04/2020. In the meantime patient will be continued on his home medication for his other medical comorbidities.   Lovenox and Aspirin are held until further evaluation by the GI team.  We will continue to monitor patient closely

## 2020-09-03 NOTE — ED PROVIDER NOTES
Department of Emergency Medicine   ED  Provider Note  Admit Date/RoomTime: 9/3/2020  7:55 AM  ED Room: 09/09          History of Present Illness:  9/3/20, Time: 10:48 AM EDT  Chief Complaint   Patient presents with    Abnormal Lab     low HGB                Marcus Cerrato II is a 53 y.o. male presenting to the ED for low hemoglobin, beginning prior to arrival.  The complaint has been persistent, moderate in severity, and worsened by nothing. Patient was sent from a nursing facility for low hemoglobin. He reports he feels tired but denies other complaints. Patient has a complex medical history in which he is currently anticoagulated for blood clots. He currently has a percutaneous biliary tube and draining his gallbladder. He denies fever chills. No chest pain or shortness of breath. hgb 5.5 per [facility] labs. They sent the labs. He denies bleeding that he knows of    Review of Systems:   Pertinent positives and negatives are stated within HPI, all other systems reviewed and are negative.        --------------------------------------------- PAST HISTORY ---------------------------------------------  Past Medical History:  has a past medical history of Accident, SIMÓN (acute kidney injury) ([facility] [address] Allergic rhinitis, Chronic back pain, Depression, Diabetes mellitus ([facility] [address] Difficulty sleeping, Displacement of lumbar intervertebral disc without myelopathy, Fibromyalgia, Fractured rib, H/O seasonal allergies, Head injury, Hyperlipidemia, Hypertension, Obesity (BMI 35.0-39.9 without comorbidity), Osteoarthritis, and Thoracic or lumbosacral neuritis or radiculitis, unspecified. Past Surgical History:  has a past surgical history that includes Neck surgery (2000); shoulder surgery (2001 &2007); Wrist surgery (2007); Rotator cuff repair (Left, 2014); hernia repair (2001); Shoulder arthroscopy (Left, 11 21 14); Vasectomy; Hip Arthroplasty (Left, 4/11/2016); Total hip arthroplasty (Right, 10/31/2016);  Foot surgery (Right, 1985); pr colonoscopy flx dx w/collj spec when pfrmd (N/A, 5/7/2018); hernia repair (Right, 12/9/2019); and CT PTC NEW ACCESS (8/3/2020). Social History:  reports that he has never smoked. His smokeless tobacco use includes chew. He reports previous alcohol use. He reports that he does not use drugs. Family History: family history includes Arthritis in his father; Cancer in his maternal grandfather and paternal uncle; Diabetes in his father and paternal grandfather; Heart Disease in his maternal grandmother; Hypertension in his mother; Stroke in his maternal aunt. . Unless otherwise noted, family history is non contributory    The patients home medications have been reviewed. Allergies: Seasonal and Tramadol    I have reviewed the past medical history, past surgical history, social history, and family history    ---------------------------------------------------PHYSICAL EXAM--------------------------------------    Constitutional/General: Alert and oriented x3  Head: Normocephalic and atraumatic  Eyes: PERRL, EOMI, sclera non icteric  Mouth: Oropharynx clear, handling secretions  Neck: Supple, full ROM, no stridor, no meningeal signs  Respiratory: Lungs clear to auscultation bilaterally, no wheezes, rales, or rhonchi. Not in respiratory distress  Cardiovascular:  Tachycardic but Regular rhythm. No murmurs, no aortic murmurs, no gallops, no rubs. 2+ distal pulses. Equal extremity pulses. Gastrointestinal:  Abdomen Soft, Non tender, Non distended. No rebound, guarding, or rigidity. No pulsatile masses. Musculoskeletal: Moves all extremities x 4. Warm and well perfused, no clubbing, no cyanosis, no edema. Capillary refill <3 seconds  Skin: skin warm and dry. No rashes. Biliary drain and right hip wound vac  Neurologic: GCS 15, no focal deficits, symmetric strength 5/5 in the upper and lower extremities bilaterally  Psychiatric: Normal Affect  Rectal: black stool, strongly guiac +, no gross blood. -------------------------------------------------- RESULTS -------------------------------------------------  I have personally reviewed all laboratory and imaging results for this patient. Results are listed below.      LABS: (Lab results interpreted by me)  Results for orders placed or performed during the hospital encounter of 09/03/20   CBC Auto Differential   Result Value Ref Range    WBC 9.2 4.5 - 11.5 E9/L    RBC 3.08 (L) 3.80 - 5.80 E12/L    Hemoglobin 8.4 (L) 12.5 - 16.5 g/dL    Hematocrit 27.5 (L) 37.0 - 54.0 %    MCV 89.3 80.0 - 99.9 fL    MCH 27.3 26.0 - 35.0 pg    MCHC 30.5 (L) 32.0 - 34.5 %    RDW 17.2 (H) 11.5 - 15.0 fL    Platelets 530 (H) 208 - 450 E9/L    MPV 8.4 7.0 - 12.0 fL    Neutrophils % 46.1 43.0 - 80.0 %    Immature Granulocytes % 0.9 0.0 - 5.0 %    Lymphocytes % 33.9 20.0 - 42.0 %    Monocytes % 13.6 (H) 2.0 - 12.0 %    Eosinophils % 5.0 0.0 - 6.0 %    Basophils % 0.5 0.0 - 2.0 %    Neutrophils Absolute 4.23 1.80 - 7.30 E9/L    Immature Granulocytes # 0.08 E9/L    Lymphocytes Absolute 3.12 1.50 - 4.00 E9/L    Monocytes Absolute 1.25 (H) 0.10 - 0.95 E9/L    Eosinophils Absolute 0.46 0.05 - 0.50 E9/L    Basophils Absolute 0.05 0.00 - 0.20 E9/L   Comprehensive Metabolic Panel   Result Value Ref Range    Sodium 141 132 - 146 mmol/L    Potassium 4.1 3.5 - 5.0 mmol/L    Chloride 106 98 - 107 mmol/L    CO2 23 22 - 29 mmol/L    Anion Gap 12 7 - 16 mmol/L    Glucose 111 (H) 74 - 99 mg/dL    BUN 21 (H) 6 - 20 mg/dL    CREATININE 1.0 0.7 - 1.2 mg/dL    GFR Non-African American >60 >=60 mL/min/1.73    GFR African American >60     Calcium 8.7 8.6 - 10.2 mg/dL    Total Protein 5.6 (L) 6.4 - 8.3 g/dL    Alb 2.7 (L) 3.5 - 5.2 g/dL    Total Bilirubin <0.2 0.0 - 1.2 mg/dL    Alkaline Phosphatase 104 40 - 129 U/L    ALT 8 0 - 40 U/L    AST 10 0 - 39 U/L   Protime-INR   Result Value Ref Range    Protime 13.3 (H) 9.3 - 12.4 sec    INR 1.2    APTT   Result Value Ref Range    aPTT 40.9 (H) 24.5 - 35.1 sec TYPE AND SCREEN   Result Value Ref Range    ABO/Rh O NEG     Antibody Screen NEG    ,       RADIOLOGY:  Interpreted by Radiologist unless otherwise specified  No orders to display             ------------------------- NURSING NOTES AND VITALS REVIEWED ---------------------------   The nursing notes within the ED encounter and vital signs as below have been reviewed by myself  BP (!) 105/49   Pulse 103   Temp 98 °F (36.7 °C) (Oral)   Resp 18   SpO2 98%     Oxygen Saturation Interpretation: Normal    The patients available past medical records and past encounters were reviewed. ------------------------------ ED COURSE/MEDICAL DECISION MAKING----------------------  Medications   pantoprazole (PROTONIX) injection 40 mg (has no administration in time range)     And   sodium chloride (PF) 0.9 % injection 10 mL (has no administration in time range)   pantoprazole (PROTONIX) injection 80 mg (80 mg Intravenous Given 9/3/20 1025)           The cardiac monitor revealed sinus tachycardia with a heart rate in the 100 s as interpreted by me. The cardiac monitor was ordered secondary to the patient's anemia and to monitor the patient for dysrhythmia. CPT [id number]       I, Dr. Farooq Cummins, am the primary provider of record    Medical Decision Making:   Laboratory work from the facility had hemoglobin of 5.5, fortunately is 8.4, unfortunately I believe he has upper GI bleed with dark black heme positive stools. Surgery has been consulted. He is hemodynamically stable. Medicine was consulted for admission. Oxygen Saturation Interpretation: 98 % on room air.   Normal      Re-Evaluations:       Heart rate improving      This patient's ED course included: a personal history and physicial examination, re-evaluation prior to disposition, IV medications, cardiac monitoring, continuous pulse oximetry and complex medical decision making and emergency management    This patient has remained hemodynamically stable during their ED course. Consultations:  Dr. Tessie Hartman from [facility], he will admit  Dr. Kayla Vega      Counseling: The emergency provider has spoken with the patient and discussed todays results, in addition to providing specific details for the plan of care and counseling regarding the diagnosis and prognosis. Questions are answered at this time and they are agreeable with the plan.       --------------------------------- IMPRESSION AND DISPOSITION ---------------------------------    IMPRESSION  1. Acute GI bleeding    2. Acute blood loss anemia        DISPOSITION  Disposition: Admit to telemetry  Patient condition is stable        NOTE: This report was transcribed using voice recognition software.  Every effort was made to ensure accuracy; however, inadvertent computerized transcription errors may be present     Faisal Barbosa MD  09/03/20 5532

## 2020-09-03 NOTE — CONSULTS
unspecified        Past Surgical History:   Procedure Laterality Date    CT PTC NEW ACCESS  8/3/2020    CT PTC NEW ACCESS 8/3/2020 [facility] CT    FOOT SURGERY Right 1985    to treat shattered bones    HERNIA REPAIR  2001    DOUBLE HERNIA    HERNIA REPAIR Right 12/9/2019    LAPAROSCOPIC RIGHT INGUINAL HERNIA REPAIR, MESH 10x15 cm PLACEMENT performed by Rebecca Palencia MD at [facility] Left 4/11/2016    NECK SURGERY  2000    FUSION    IA COLONOSCOPY FLX DX W/COLLJ SPEC WHEN PFRMD N/A 5/7/2018    COLONOSCOPY DIAGNOSTIC performed by Marcin Greenberg MD at [facility] Left 2014    Dr. Cely Hernandez ARTHROSCOPY Left 11 21 14    SHOULDER SURGERY  2001 &2007    RIGHT AND LEFT repair of tears    TOTAL HIP ARTHROPLASTY Right 10/31/2016    Total right hip  Jessica Li MD    VASECTOMY      WRIST SURGERY  2007    LEFT WRIST       Medications Prior to Admission    Prior to Admission medications    Medication Sig Start Date End Date Taking? Authorizing Provider   pregabalin (LYRICA) 300 MG capsule Take 300 mg by mouth 2 times daily.    Yes Historical Provider, MD   aspirin 81 MG chewable tablet Take 81 mg by mouth daily   Yes Historical Provider, MD   melatonin 3 MG TABS tablet Take 3 mg by mouth nightly as needed (sleep)   Yes Historical Provider, MD   ibuprofen (ADVIL;MOTRIN) 600 MG tablet Take 600 mg by mouth every 6 hours as needed for Pain   Yes Historical Provider, MD   lisinopril (PRINIVIL;ZESTRIL) 20 MG tablet Take 20 mg by mouth daily   Yes Historical Provider, MD   fluticasone (FLONASE) 50 MCG/ACT nasal spray 2 sprays by Nasal route daily   Yes Historical Provider, MD   aspirin 81 MG EC tablet Take 81 mg by mouth 2 times daily   Yes Historical Provider, MD   cyclobenzaprine (FLEXERIL) 10 MG tablet Take 10 mg by mouth three times daily   Yes Historical Provider, MD   fenofibrate (TRICOR) 54 MG tablet Take 54 mg by mouth daily   Yes Historical Provider, MD   ferrous sulfate (IRON 325) 325 (65 Fe) MG tablet Take 325 mg by mouth 2 times daily   Yes Historical Provider, MD   hydroxyurea (HYDREA) 500 MG chemo capsule Take 500 mg by mouth 2 times daily   Yes Historical Provider, MD   loperamide (IMODIUM) 2 MG capsule Take 2 mg by mouth 4 times daily as needed for Diarrhea   Yes Historical Provider, MD   nystatin (MYCOSTATIN) 435671 UNIT/GM powder Apply topically 2 times daily   Yes Historical Provider, MD   insulin lispro (HUMALOG) 100 UNIT/ML injection vial Inject into the skin 3 times daily (before meals) *Plus Sliding Scale*   Yes Historical Provider, MD   apixaban (ELIQUIS) 5 MG TABS tablet Take 1 tablet by mouth 2 times daily 8/11/20  Yes Yasir Grimaldo MD   enoxaparin (LOVENOX) 120 MG/0.8ML injection Inject 120 mg into the skin every 12 hours    Yes Historical Provider, MD   insulin glargine (LANTUS) 100 UNIT/ML injection vial Inject 20 Units into the skin nightly   Yes Historical Provider, MD   oxyCODONE-acetaminophen (PERCOCET) 5-325 MG per tablet Take 1 tablet by mouth every 6 hours as needed for Pain.    Yes Historical Provider, MD   pantoprazole (PROTONIX) 40 MG tablet Take 40 mg by mouth daily   Yes Historical Provider, MD   DULoxetine (CYMBALTA) 30 MG extended release capsule Take 1 capsule by mouth daily 5/14/20  Yes IGOR Parker CNP   pravastatin (PRAVACHOL) 20 MG tablet Take 1 tablet by mouth nightly 5/14/20  Yes IGOR Parker CNP       Allergies   Allergen Reactions    Seasonal      HAYFEVER / sneezing,watery eyes    Tramadol Itching       Family History   Problem Relation Age of Onset    Hypertension Mother     Arthritis Father     Diabetes Father     Cancer Maternal Grandfather         Skin    Cancer Paternal Uncle         skin    Diabetes Paternal Grandfather     Heart Disease Maternal Grandmother     Stroke Maternal Aunt        Social History     Tobacco Use    Smoking status: Never Smoker    Smokeless tobacco: Current User Types: Chew   Substance Use Topics    Alcohol use: Not Currently     Alcohol/week: 0.0 standard drinks     Frequency: Monthly or less    Drug use: No         Review of Systems:   General ROS: negative  Hematological and Lymphatic ROS: positive for - blood clots  Respiratory ROS: no cough, shortness of breath, or wheezing  Cardiovascular ROS: no chest pain or dyspnea on exertion  Gastrointestinal ROS: positive for - abdominal pain  Genito-Urinary ROS: no dysuria, trouble voiding, or hematuria  Musculoskeletal ROS: negative      PHYSICAL EXAM:    Vitals:    09/03/20 1029   BP: (!) 105/49   Pulse: 103   Resp: 18   Temp:    SpO2: 98%       GENERAL:  NAD. A&Ox3. HEAD:  Normocephalic. Atraumatic. EYES:   No scleral icterus. PERRL. LUNGS:  No increased work of breathing. CARDIOVASCULAR: RR  ABDOMEN:  Soft, mildly-distended, tender. No guarding, rigidity, rebound. EXTREMITIES:   MAEx4. Atraumatic. No LE edema. SKIN:  Warm and dry  NEUROLOGIC:  GCS 15  RECTAL: No hemorrhoids. FOBT positive    LABS:    CBC  Recent Labs     09/03/20  0827   WBC 9.2   HGB 8.4*   HCT 27.5*   *     BMP  Recent Labs     09/03/20  0827      K 4.1      CO2 23   BUN 21*   CREATININE 1.0   CALCIUM 8.7     Liver Function  Recent Labs     09/03/20  0827   BILITOT <0.2   AST 10   ALT 8   ALKPHOS 104   PROT 5.6*   LABALBU 2.7*     No results for input(s): LACTATE in the last 72 hours. No results for input(s): INR, PTT in the last 72 hours. Invalid input(s): PT    RADIOLOGY    No results found. ASSESSMENT/PLAN:  53 y.o. male with past medical hx significant for PE, splenic infarct, who presents with concerns for upper GI bleed    1. Upper GI bleed  2.  PUD    Plan will be  - Admit to medicine  - IV protonix BID  - H&H q8H  - Coags  - NPO after midnight  - For an EGD tomorrow with Dr. Phong Forbes     discussed with Dr. Shelby Regan DO  Resident, PGY-1  9/3/2020  11:22 AM

## 2020-09-04 ENCOUNTER — ANESTHESIA EVENT (OUTPATIENT)
Dept: ENDOSCOPY | Age: 45
DRG: 241 | End: 2020-09-04
Payer: COMMERCIAL

## 2020-09-04 ENCOUNTER — ANESTHESIA (OUTPATIENT)
Dept: ENDOSCOPY | Age: 45
DRG: 241 | End: 2020-09-04
Payer: COMMERCIAL

## 2020-09-04 VITALS
RESPIRATION RATE: 19 BRPM | DIASTOLIC BLOOD PRESSURE: 51 MMHG | SYSTOLIC BLOOD PRESSURE: 97 MMHG | OXYGEN SATURATION: 100 %

## 2020-09-04 LAB
ANION GAP SERPL CALCULATED.3IONS-SCNC: 12 MMOL/L (ref 7–16)
BUN BLDV-MCNC: 14 MG/DL (ref 6–20)
CALCIUM SERPL-MCNC: 8.2 MG/DL (ref 8.6–10.2)
CHLORIDE BLD-SCNC: 105 MMOL/L (ref 98–107)
CO2: 21 MMOL/L (ref 22–29)
CREAT SERPL-MCNC: 0.8 MG/DL (ref 0.7–1.2)
GFR AFRICAN AMERICAN: >60
GFR NON-AFRICAN AMERICAN: >60 ML/MIN/1.73
GLUCOSE BLD-MCNC: 85 MG/DL (ref 74–99)
HCT VFR BLD CALC: 25.1 % (ref 37–54)
HCT VFR BLD CALC: 26.8 % (ref 37–54)
HCT VFR BLD CALC: 29.2 % (ref 37–54)
HEMOGLOBIN: 7.9 G/DL (ref 12.5–16.5)
HEMOGLOBIN: 8.1 G/DL (ref 12.5–16.5)
HEMOGLOBIN: 8.8 G/DL (ref 12.5–16.5)
LACTIC ACID: 2.1 MMOL/L (ref 0.5–2.2)
METER GLUCOSE: 103 MG/DL (ref 74–99)
METER GLUCOSE: 72 MG/DL (ref 74–99)
METER GLUCOSE: 79 MG/DL (ref 74–99)
METER GLUCOSE: 80 MG/DL (ref 74–99)
METER GLUCOSE: 82 MG/DL (ref 74–99)
POTASSIUM REFLEX MAGNESIUM: 4.2 MMOL/L (ref 3.5–5)
SARS-COV-2, NAAT: NOT DETECTED
SODIUM BLD-SCNC: 138 MMOL/L (ref 132–146)

## 2020-09-04 PROCEDURE — 6360000002 HC RX W HCPCS: Performed by: CLINICAL NURSE SPECIALIST

## 2020-09-04 PROCEDURE — U0002 COVID-19 LAB TEST NON-CDC: HCPCS

## 2020-09-04 PROCEDURE — 6370000000 HC RX 637 (ALT 250 FOR IP): Performed by: INTERNAL MEDICINE

## 2020-09-04 PROCEDURE — 0DJ08ZZ INSPECTION OF UPPER INTESTINAL TRACT, VIA NATURAL OR ARTIFICIAL OPENING ENDOSCOPIC: ICD-10-PCS | Performed by: SURGERY

## 2020-09-04 PROCEDURE — 2500000003 HC RX 250 WO HCPCS: Performed by: CLINICAL NURSE SPECIALIST

## 2020-09-04 PROCEDURE — C9113 INJ PANTOPRAZOLE SODIUM, VIA: HCPCS | Performed by: CLINICAL NURSE SPECIALIST

## 2020-09-04 PROCEDURE — 43235 EGD DIAGNOSTIC BRUSH WASH: CPT | Performed by: SURGERY

## 2020-09-04 PROCEDURE — 80048 BASIC METABOLIC PNL TOTAL CA: CPT

## 2020-09-04 PROCEDURE — 6360000002 HC RX W HCPCS

## 2020-09-04 PROCEDURE — 2580000003 HC RX 258: Performed by: CLINICAL NURSE SPECIALIST

## 2020-09-04 PROCEDURE — 83605 ASSAY OF LACTIC ACID: CPT

## 2020-09-04 PROCEDURE — 85018 HEMOGLOBIN: CPT

## 2020-09-04 PROCEDURE — 3609017100 HC EGD: Performed by: SURGERY

## 2020-09-04 PROCEDURE — 85014 HEMATOCRIT: CPT

## 2020-09-04 PROCEDURE — 3700000000 HC ANESTHESIA ATTENDED CARE: Performed by: SURGERY

## 2020-09-04 PROCEDURE — 36415 COLL VENOUS BLD VENIPUNCTURE: CPT

## 2020-09-04 PROCEDURE — 6370000000 HC RX 637 (ALT 250 FOR IP): Performed by: STUDENT IN AN ORGANIZED HEALTH CARE EDUCATION/TRAINING PROGRAM

## 2020-09-04 PROCEDURE — 2060000000 HC ICU INTERMEDIATE R&B

## 2020-09-04 PROCEDURE — 3700000001 HC ADD 15 MINUTES (ANESTHESIA): Performed by: SURGERY

## 2020-09-04 PROCEDURE — 2580000003 HC RX 258

## 2020-09-04 PROCEDURE — 2709999900 HC NON-CHARGEABLE SUPPLY: Performed by: SURGERY

## 2020-09-04 PROCEDURE — 6370000000 HC RX 637 (ALT 250 FOR IP): Performed by: CLINICAL NURSE SPECIALIST

## 2020-09-04 PROCEDURE — 2580000003 HC RX 258: Performed by: INTERNAL MEDICINE

## 2020-09-04 PROCEDURE — 82962 GLUCOSE BLOOD TEST: CPT

## 2020-09-04 PROCEDURE — 7100000001 HC PACU RECOVERY - ADDTL 15 MIN: Performed by: SURGERY

## 2020-09-04 PROCEDURE — 7100000000 HC PACU RECOVERY - FIRST 15 MIN: Performed by: SURGERY

## 2020-09-04 RX ORDER — INSULIN GLARGINE 100 [IU]/ML
10 INJECTION, SOLUTION SUBCUTANEOUS NIGHTLY
Status: DISCONTINUED | OUTPATIENT
Start: 2020-09-04 | End: 2020-09-05

## 2020-09-04 RX ORDER — SODIUM CHLORIDE 9 MG/ML
INJECTION, SOLUTION INTRAVENOUS CONTINUOUS PRN
Status: DISCONTINUED | OUTPATIENT
Start: 2020-09-04 | End: 2020-09-04 | Stop reason: SDUPTHER

## 2020-09-04 RX ORDER — PROPOFOL 10 MG/ML
INJECTION, EMULSION INTRAVENOUS PRN
Status: DISCONTINUED | OUTPATIENT
Start: 2020-09-04 | End: 2020-09-04 | Stop reason: SDUPTHER

## 2020-09-04 RX ADMIN — CYCLOBENZAPRINE 10 MG: 10 TABLET, FILM COATED ORAL at 14:39

## 2020-09-04 RX ADMIN — HYDROXYUREA 500 MG: 500 CAPSULE ORAL at 21:51

## 2020-09-04 RX ADMIN — FERROUS SULFATE TAB 325 MG (65 MG ELEMENTAL FE) 325 MG: 325 (65 FE) TAB at 14:39

## 2020-09-04 RX ADMIN — PRAVASTATIN SODIUM 20 MG: 20 TABLET ORAL at 20:51

## 2020-09-04 RX ADMIN — SODIUM CHLORIDE: 9 INJECTION, SOLUTION INTRAVENOUS at 06:14

## 2020-09-04 RX ADMIN — SODIUM CHLORIDE, PRESERVATIVE FREE 10 ML: 5 INJECTION INTRAVENOUS at 20:53

## 2020-09-04 RX ADMIN — OXYCODONE AND ACETAMINOPHEN 1 TABLET: 5; 325 TABLET ORAL at 21:56

## 2020-09-04 RX ADMIN — OXYCODONE AND ACETAMINOPHEN 1 TABLET: 5; 325 TABLET ORAL at 03:27

## 2020-09-04 RX ADMIN — FLUTICASONE PROPIONATE 2 SPRAY: 50 SPRAY, METERED NASAL at 14:39

## 2020-09-04 RX ADMIN — DULOXETINE HYDROCHLORIDE 30 MG: 30 CAPSULE, DELAYED RELEASE ORAL at 14:39

## 2020-09-04 RX ADMIN — SODIUM CHLORIDE: 9 INJECTION, SOLUTION INTRAVENOUS at 22:48

## 2020-09-04 RX ADMIN — PREGABALIN 300 MG: 150 CAPSULE ORAL at 20:50

## 2020-09-04 RX ADMIN — OXYCODONE AND ACETAMINOPHEN 1 TABLET: 5; 325 TABLET ORAL at 09:31

## 2020-09-04 RX ADMIN — SODIUM CHLORIDE, PRESERVATIVE FREE 10 ML: 5 INJECTION INTRAVENOUS at 09:29

## 2020-09-04 RX ADMIN — HYDROXYUREA 500 MG: 500 CAPSULE ORAL at 14:47

## 2020-09-04 RX ADMIN — OXYCODONE AND ACETAMINOPHEN 1 TABLET: 5; 325 TABLET ORAL at 15:45

## 2020-09-04 RX ADMIN — PROPOFOL 180 MG: 10 INJECTION, EMULSION INTRAVENOUS at 13:21

## 2020-09-04 RX ADMIN — CYCLOBENZAPRINE 10 MG: 10 TABLET, FILM COATED ORAL at 20:51

## 2020-09-04 RX ADMIN — POLYETHYLENE GLYCOL 3350, SODIUM SULFATE ANHYDROUS, SODIUM BICARBONATE, SODIUM CHLORIDE, POTASSIUM CHLORIDE 4000 ML: 236; 22.74; 6.74; 5.86; 2.97 POWDER, FOR SOLUTION ORAL at 15:46

## 2020-09-04 RX ADMIN — PREGABALIN 300 MG: 150 CAPSULE ORAL at 14:39

## 2020-09-04 RX ADMIN — SODIUM CHLORIDE: 9 INJECTION, SOLUTION INTRAVENOUS at 13:21

## 2020-09-04 RX ADMIN — FERROUS SULFATE TAB 325 MG (65 MG ELEMENTAL FE) 325 MG: 325 (65 FE) TAB at 20:51

## 2020-09-04 RX ADMIN — PANTOPRAZOLE SODIUM 40 MG: 40 INJECTION, POWDER, FOR SOLUTION INTRAVENOUS at 09:29

## 2020-09-04 RX ADMIN — PANTOPRAZOLE SODIUM 40 MG: 40 INJECTION, POWDER, FOR SOLUTION INTRAVENOUS at 20:53

## 2020-09-04 RX ADMIN — INSULIN GLARGINE 10 UNITS: 100 INJECTION, SOLUTION SUBCUTANEOUS at 21:00

## 2020-09-04 RX ADMIN — FENOFIBRATE 54 MG: 54 TABLET ORAL at 14:39

## 2020-09-04 RX ADMIN — SODIUM CHLORIDE: 9 INJECTION, SOLUTION INTRAVENOUS at 03:29

## 2020-09-04 RX ADMIN — MICONAZOLE NITRATE: 20.6 POWDER TOPICAL at 14:39

## 2020-09-04 ASSESSMENT — PAIN SCALES - GENERAL
PAINLEVEL_OUTOF10: 10
PAINLEVEL_OUTOF10: 0
PAINLEVEL_OUTOF10: 8
PAINLEVEL_OUTOF10: 10
PAINLEVEL_OUTOF10: 10
PAINLEVEL_OUTOF10: 5

## 2020-09-04 ASSESSMENT — PAIN DESCRIPTION - PROGRESSION
CLINICAL_PROGRESSION: NOT CHANGED
CLINICAL_PROGRESSION: NOT CHANGED

## 2020-09-04 ASSESSMENT — PAIN DESCRIPTION - PAIN TYPE: TYPE: ACUTE PAIN

## 2020-09-04 NOTE — PLAN OF CARE
Problem: Falls - Risk of:  Goal: Will remain free from falls  Description: Will remain free from falls  9/4/2020 0057 by Sp Wylie RN  Outcome: Met This Shift     Problem: Falls - Risk of:  Goal: Absence of physical injury  Description: Absence of physical injury  9/4/2020 0057 by Sp Wylie RN  Outcome: Met This Shift     Problem: Pain:  Goal: Pain level will decrease  Description: Pain level will decrease  Outcome: Met This Shift     Problem: Pain:  Goal: Control of acute pain  Description: Control of acute pain  Outcome: Met This Shift     Problem: Pain:  Goal: Control of chronic pain  Description: Control of chronic pain  Outcome: Met This Shift

## 2020-09-04 NOTE — PROGRESS NOTES
Hospitalist Progress Note      SYNOPSIS: Patient admitted on 9/3/2020 for       SUBJECTIVE:    Patient seen and examined  Records reviewed. Upon evaluation patient was lying on the bed. No supplemental oxygen required. No new major complaints. Patient denied chest pain, shortness of breath and palpitation. No cough and wheezing. No fever, chills or rigors. No vomiting,abdominal pain and diarrhea. No urinary complaints. Stable overnight. No other overnight issues reported. Temp (24hrs), Av °F (36.1 °C), Min:95.8 °F (35.4 °C), Max:97.6 °F (36.4 °C)    DIET: Diet NPO, After Midnight  CODE: Full Code    Intake/Output Summary (Last 24 hours) at 2020 1323  Last data filed at 2020 1239  Gross per 24 hour   Intake 2198 ml   Output 705 ml   Net 1493 ml       OBJECTIVE:    BP (!) 102/40   Pulse 93   Temp 97.6 °F (36.4 °C) (Temporal)   Resp 22   Ht 6' (1.829 m)   Wt 240 lb (108.9 kg)   SpO2 98%   BMI 32.55 kg/m²     General appearance: No apparent distress, appears stated age and cooperative. HEENT:  Conjunctivae/corneas clear. Neck: Supple. No jugular venous distention. Respiratory: Clear to auscultation bilaterally, normal respiratory effort  Cardiovascular: Regular rate rhythm, normal S1-S2  Abdomen: Soft, nontender, non distended. Cholecystomy tube in place. Musculoskeletal: No clubbing, cyanosis, no bilateral lower extremity edema. Brisk capillary refill. Skin:Sacral decubitus wound present. Neurologic: awake, alert and following commands     ASSESSMENT:  1. Suspected PUD/GI bleeding:patient is on PPI. Apixaban and ASA are held. Patient did undergo  EGD on  which only showed minimal gastritis. Continue PPI. 2. Anemia of suspected acute blood loss in the setting of underlying anemia of chronic disease:H/H serially monitored and no notable drop noted. Will continue to monitor the H/H on consistent bases.   3. Hypotension: Due to dehydration and also possible volume loss from draining cholecystostomy tube. BP noted to be low. Lisinopril discontinued. IVF boluses given. Maintenance IV fluid at 125 mL/h. Monitor the vital signs closely. Will obtain Lactate. 4. Type II DM with long-term insulin use and with complication: On Lantus and sliding scale. Blood sugar running low and thus Lantus dose reduced. ADA diet. Insulin dose to be adjusted per the BS. 5. Diabetic neuropathy:on Lyrica. 6. Hyperlipidemia, mixed:on Statin and Fenofibrate. 7. Hx of PE: Apixaban was on hold because of suspected GI bleed. GI showed minimal gastritis and then patient will be resumed on anticoagulation. 8. Hx of Thrombocytosis:on Hydroxyurea. 9. Hx of  Splenic thrombosis: Apixaban was on hold while patient is undergoing GI work-up. Resume Apixaban as the GI work-up is not remarkable. 10. Hx of splenic infarction: This was likely to be due to splenic thrombosis  11. Sacral decubitus wound, stage II/III,present on admission: Continue skin and wound care. 12. Hx of acute cholecystitis:Currently patient has cholecystomy tube placement  13. Obesity with BMI of 32.55: Patient sho benefit from lifestyle modification for weight loss  14. Osteoarthritis of hip joint is status post replacement: Continue symptomatic management  15. DVT prophylaxis:SCD. Apixaban resumed.     DISPOSITION: Anticipate  possible discharge in a day or 2    Medications:  REVIEWED DAILY    Infusion Medications    dextrose      sodium chloride 125 mL/hr at 09/04/20 6414     Scheduled Medications    pantoprazole  40 mg Intravenous BID    And    sodium chloride (PF)  10 mL Intravenous BID    pregabalin  300 mg Oral BID    pravastatin  20 mg Oral Nightly    miconazole   Topical BID    insulin glargine  20 Units Subcutaneous Nightly    hydroxyurea  500 mg Oral BID    fluticasone  2 spray Nasal Daily    fenofibrate  54 mg Oral Daily    DULoxetine  30 mg Oral Daily    cyclobenzaprine  10 mg Oral TID    sodium chloride flush  10 mL Intravenous 2

## 2020-09-04 NOTE — PROGRESS NOTES
Latex:  Patient denies allergy to latex.  Medications reviewed with patient.  Tobacco use verified.  Allergies verified.    REFERRING MD:  VELMA Kwon  Primary Medical Doctor: VELMA Kwon   DATE OF INJURY/SURGERY:  Over 2 years  WORK RELATED: no  OCCUPATION: not working    Patient is here for cervical/ lumbar pain follow up.  Pt here to review MRI results.  She is taking tylenol, NSAIDS for pain.      Message sent to [facility] to see if pt is okay to restart eliquis from their POV    Per [facility] to hold eliquis, pt is for possible coloscopy

## 2020-09-04 NOTE — OP NOTE
Operative Note      Patient: Kenzie Castillo II  YOB: 1975  MRN: [id number]    Date of Procedure: 9/4/2020    Pre-Op Diagnosis: anemia    Post-Op Diagnosis: Same and minimal gastritis       Procedure(s):  EGD BIOPSY    Surgeon(s):  Marcio Freeman MD    Assistant:   * No surgical staff found *    Anesthesia: Monitor Anesthesia Care    Estimated Blood Loss (mL): Minimal    Complications: None    Specimens:   * No specimens in log *    Implants:  * No implants in log *      Drains:   Closed/Suction Drain Right RUQ Bulb 10 Latvian (Active)   Site Description Healing 09/03/20 1333   Dressing Status Clean;Dry; Intact 09/03/20 1333   Drainage Appearance Bile;Green 09/03/20 1333   Status Compressed 09/03/20 1333   Output (ml) 115 ml 09/04/20 1239       [REMOVED] Urethral Catheter Straight-tip 16 fr (Removed)   $ Urethral catheter insertion $ Not inserted for procedure 08/01/20 2340   Catheter Indications Need for fluid management in critically ill patients in a critical care setting not able to be managed by other means such as BSC with hat, bedpan, urinal, condom catheter, or short term intermittent urethral catherization 08/04/20 0600   Site Assessment No urethral drainage 08/04/20 0600   Urine Color Yellow 08/04/20 0600   Urine Appearance Clear 08/04/20 0600   Output (mL) 75 mL 08/04/20 0600       Findings: minimal gastritis    Detailed Description of Procedure:   ESOPHAGOGASTRODUODENOSCOPY PROCEDURE NOTE  PROCEDURE:  The patient was brought into the endoscopy suite and placed in the left lateral decubitus position. A bite block was placed in the patients mouth. After the initiation of LMAC anesthesia, a gastroscope was inserted into the patient's mouth and passed into the esophagus and into the stomach. Immediately upon entering the stomach the note of minimal gastritis was made.   The scope was advanced through the pylorus into the first and second portion of the duodenum making the note of normal duodenum. The scope was then withdrawn back into the stomach where it was retroflexed. There was no hiatal hernia noted. The scope was then straightened and then withdrawn the entire length of the esophagus, making the note of a normal esophagus without masses, ulcerations, or lesions noted. The scope was withdrawn entirely. The patient tolerated the procedure well and there were no complications.       Kaity Stack MD  9/4/2020        Electronically signed by Kaity Stack MD on 9/4/2020 at 1:28 PM

## 2020-09-04 NOTE — DISCHARGE INSTR - COC
Continuity of Care Form    Patient Name: An Pittman   :  1975  MRN:  [id number]    Admit date:  9/3/2020  Discharge date:  2020    Code Status Order: Full Code   Advance Directives:   [facility] Documentation     Date/Time Healthcare Directive Type of Healthcare Directive Copy in [facility] [address] Agent's Name Healthcare Agent's Phone Number    [phone]  No, patient does not have an advance directive for healthcare treatment -- -- -- -- --          Admitting Physician:  Jagjit Li MD  PCP: Paresh Hess, APRN - CNP    Discharging Nurse: [address] [place] [address] Unit/Room#: 2812/5431-T  Discharging Unit Phone Number: [phone]    Emergency Contact:   Extended Emergency Contact Information  Primary Emergency Contact: Jovan Ang of [facility] Phone: [phone]  Mobile Phone: [phone]  Relation: Child  Preferred language: English   needed? No  [facility] Phone: [phone]  Mobile Phone: [phone]  Relation: Brother/Sister  Preferred language: English   needed?  No    Past Surgical History:  Past Surgical History:   Procedure Laterality Date    CT PTC NEW ACCESS  8/3/2020    CT PTC NEW ACCESS 8/3/2020 [facility] CT    FOOT SURGERY Right     to treat shattered bones    HERNIA REPAIR      DOUBLE HERNIA    HERNIA REPAIR Right 2019    LAPAROSCOPIC RIGHT INGUINAL HERNIA REPAIR, MESH 10x15 cm PLACEMENT performed by Nhi Valenzuela MD at [address] [place] [address] Left 2016    NECK SURGERY      FUSION    MT COLONOSCOPY FLX DX W/COLLJ SPEC WHEN PFRMD N/A 2018    COLONOSCOPY DIAGNOSTIC performed by Amanda Whitaker MD at [address] [place] [address] Left     Dr. Sanchez Muhammad ARTHROSCOPY Left 11 21 14    SHOULDER SURGERY   &    RIGHT AND LEFT repair of tears    TOTAL HIP ARTHROPLASTY Right 10/31/2016    Total COVID-19 Rule Out 08/06/20 08/06/20 08/06/20 COVID-19 (Ordered)   08/06/20 Rule-Out Test Resulted    C-diff Rule Out 07/31/20 07/31/20 07/31/20 Clostridium difficile EIA (Ordered)   08/06/20 Reanna Asher RN    Order discontinued by RN/physician    MRSA 06/30/19 07/02/19 06/30/19 Wound Culture   08/06/20 Reanna Asher RN          Nurse Assessment:  Last Vital Signs: BP (!) 102/40   Pulse 93   Temp 97.6 °F (36.4 °C) (Temporal)   Resp 22   Ht 6' (1.829 m)   Wt 240 lb (108.9 kg)   SpO2 98%   BMI 32.55 kg/m²     Last documented pain score (0-10 scale): Pain Level: 5  Last Weight:   Wt Readings from Last 1 Encounters:   09/03/20 240 lb (108.9 kg)     Mental Status:  oriented and alert    IV Access:  - None    Nursing Mobility/ADLs:  Walking   Independent  Transfer  Independent  Bathing  Independent  Dressing  Independent  Toileting  Independent  Feeding  Independent  Med Admin  Assisted  Med Delivery   whole    Wound Care Documentation and Therapy:  Incision 04/11/16 Hip Left (Active)   Number of days: 4474       Incision 10/31/16 Hip Right (Active)   Number of days: 8513       Wound 07/31/20 Sacrum (Active)   Wound Pressure Stage  4 09/03/20 1303   Dressing Status Clean;Dry; Intact 09/04/20 0805   Dressing Changed Changed/New 09/03/20 1303   Dressing/Treatment Other (comment) 09/03/20 1303   Wound Cleansed Rinsed/Irrigated with saline 09/03/20 1303   Wound Length (cm) 3 cm 09/03/20 1303   Wound Width (cm) 1.5 cm 09/03/20 1303   Wound Depth (cm) 5 cm 09/03/20 1303   Wound Surface Area (cm^2) 4.5 cm^2 09/03/20 1303   Change in Wound Size % (l*w) 66.67 09/03/20 1303   Wound Volume (cm^3) 22.5 cm^3 09/03/20 1303   Wound Healing % 44 09/03/20 1303   Wound Assessment Other (Comment) 09/04/20 0805   Drainage Amount Scant 09/03/20 1303   Odor Mild 09/03/20 1303   Number of days: 34       Wound 08/05/20 Buttocks Right; Inner (Active)   Dressing/Treatment Open to air 09/03/20 1303   Wound Cleansed Rinsed/Irrigated with saline 09/03/20 1303   Wound Length (cm) 5 cm 09/03/20 1303   Wound Width (cm) 1 cm 09/03/20 1303   Wound Surface Area (cm^2) 5 cm^2 09/03/20 1303   Change in Wound Size % (l*w) 44.44 09/03/20 1303   Drainage Amount None 09/03/20 1303   Odor None 09/03/20 1303   Number of days: 30       Wound 08/05/20 Buttocks Left; Inner (Active)   Dressing/Treatment Open to air 09/03/20 1303   Wound Cleansed Rinsed/Irrigated with saline 09/03/20 1303   Wound Length (cm) 6 cm 09/03/20 1303   Wound Width (cm) 2.5 cm 09/03/20 1303   Wound Surface Area (cm^2) 15 cm^2 09/03/20 1303   Change in Wound Size % (l*w) -25 09/03/20 1303   Drainage Amount None 09/03/20 1303   Odor None 09/03/20 1303   Number of days: 30        Elimination:  Continence:   · Bowel: Yes  · Bladder: Yes  Urinary Catheter: None   Colostomy/Ileostomy/Ileal Conduit: No       Date of Last BM:     Intake/Output Summary (Last 24 hours) at 9/4/2020 1144  Last data filed at 9/4/2020 0931  Gross per 24 hour   Intake 2198 ml   Output 590 ml   Net 1608 ml     I/O last 3 completed shifts:  In: -   Out: 185 [Drains:185]    Safety Concerns:     None    Impairments/Disabilities:      None    Nutrition Therapy:  Current Nutrition Therapy:   - Oral Diet:  General    Routes of Feeding: Oral  Liquids: Thin Liquids  Daily Fluid Restriction: no  Last Modified Barium Swallow with Video (Video Swallowing Test): not done    Treatments at the Time of Hospital Discharge:   Respiratory Treatments: na  Oxygen Therapy:  is not on home oxygen therapy.   Ventilator:    - No ventilator support    Rehab Therapies: {THERAPEUTIC INTERVENTION:[id number]}  Weight Bearing Status/Restrictions: No weight bearing restirctions  Other Medical Equipment (for information only, NOT a DME order):  reji drain  Other Treatments: ***    Patient's personal belongings (please select all that are sent with patient):  {[facility] DME Belongings:[id number]}    RN SIGNATURE:  Electronically signed by Cain Guillermo RN on 9/11/20 at

## 2020-09-04 NOTE — ANESTHESIA PRE PROCEDURE
Department of Anesthesiology  Preprocedure Note       Name:  Palma Cedillo   Age:  53 y.o.  :  1975                                          MRN:  [id number]         Date:  2020      Surgeon: Yelitza Green):  Gia Caballero MD    Procedure: Procedure(s):  COLONOSCOPY DIAGNOSTIC    Medications prior to admission:   Prior to Admission medications    Medication Sig Start Date End Date Taking? Authorizing Provider   pregabalin (LYRICA) 300 MG capsule Take 300 mg by mouth 2 times daily.    Yes Historical Provider, MD   aspirin 81 MG chewable tablet Take 81 mg by mouth daily   Yes Historical Provider, MD   melatonin 3 MG TABS tablet Take 3 mg by mouth nightly as needed (sleep)   Yes Historical Provider, MD   ibuprofen (ADVIL;MOTRIN) 600 MG tablet Take 600 mg by mouth every 6 hours as needed for Pain   Yes Historical Provider, MD   lisinopril (PRINIVIL;ZESTRIL) 20 MG tablet Take 20 mg by mouth daily   Yes Historical Provider, MD   fluticasone (FLONASE) 50 MCG/ACT nasal spray 2 sprays by Nasal route daily   Yes Historical Provider, MD   aspirin 81 MG EC tablet Take 81 mg by mouth 2 times daily   Yes Historical Provider, MD   cyclobenzaprine (FLEXERIL) 10 MG tablet Take 10 mg by mouth three times daily   Yes Historical Provider, MD   fenofibrate (TRICOR) 54 MG tablet Take 54 mg by mouth daily   Yes Historical Provider, MD   ferrous sulfate (IRON 325) 325 (65 Fe) MG tablet Take 325 mg by mouth 2 times daily   Yes Historical Provider, MD   hydroxyurea (HYDREA) 500 MG chemo capsule Take 500 mg by mouth 2 times daily   Yes Historical Provider, MD   loperamide (IMODIUM) 2 MG capsule Take 2 mg by mouth 4 times daily as needed for Diarrhea   Yes Historical Provider, MD   nystatin (MYCOSTATIN) 848452 UNIT/GM powder Apply topically 2 times daily   Yes Historical Provider, MD   insulin lispro (HUMALOG) 100 UNIT/ML injection vial Inject into the skin 3 times daily (before meals) *Plus Sliding Scale*   Yes Historical Gluckner, APRN - CNS        hydroxyurea (HYDREA) chemo capsule 500 mg  500 mg Oral BID IGOR Saunders   500 mg at 09/04/20 1447    fluticasone (FLONASE) 50 MCG/ACT nasal spray 2 spray  2 spray Nasal Daily IGOR Saunders   2 spray at 09/04/20 1439    fenofibrate (TRICOR) tablet 54 mg  54 mg Oral Daily IGOR Saunders   54 mg at 09/04/20 1439    DULoxetine (CYMBALTA) extended release capsule 30 mg  30 mg Oral Daily IGOR Saunders   30 mg at 09/04/20 1439    cyclobenzaprine (FLEXERIL) tablet 10 mg  10 mg Oral TID IGOR Saunders   10 mg at 09/04/20 1439    sodium chloride flush 0.9 % injection 10 mL  10 mL Intravenous 2 times per day IGOR Saunders   10 mL at 09/04/20 8691    sodium chloride flush 0.9 % injection 10 mL  10 mL Intravenous PRN IGOR Saunders        acetaminophen (TYLENOL) tablet 650 mg  650 mg Oral Q6H PRN IGOR Saunders        Or    acetaminophen (TYLENOL) suppository 650 mg  650 mg Rectal Q6H PRN IGOR Saunders        polyethylene glycol (GLYCOLAX) packet 17 g  17 g Oral Daily PRN IGOR Saunders        promethazine (PHENERGAN) tablet 12.5 mg  12.5 mg Oral Q6H PRN IGOR Saunders        Or    ondansetron (ZOFRAN) injection 4 mg  4 mg Intravenous Q6H PRN IGOR Saunders        influenza quadrivalent split vaccine (FLUZONE;FLUARIX;FLULAVAL;AFLURIA) injection 0.5 mL  0.5 mL Intramuscular Prior to discharge Marybeth Guillen DO        insulin lispro (HUMALOG) injection vial 0-6 Units  0-6 Units Subcutaneous TID  IGOR Saunders   Stopped at 09/03/20 1818    insulin lispro (HUMALOG) injection vial 0-3 Units  0-3 Units Subcutaneous Nightly IGOR Saunders   1 Units at 09/03/20 2003    glucose (GLUTOSE) 40 % oral gel 15 g  15 g Oral PRN IGOR Saunders - CNS        dextrose 50 % IV solution  12.5 g Intravenous PRN [place] Comp, APRN - CNS        glucagon (rDNA) injection 1 mg  1 mg Intramuscular PRN [place] Comp, APRN - CNS        dextrose 5 % solution  100 mL/hr Intravenous PRN [place] Teofilo, APRN - CNS        0.9 % sodium chloride infusion   Intravenous Continuous Danny Pickett  mL/hr at 09/04/20 0614      ferrous sulfate (IRON 325) tablet 325 mg  325 mg Oral BID Danny Pickett MD   325 mg at 09/04/20 1439       Allergies:     Allergies   Allergen Reactions    Seasonal      HAYFEVER / sneezing,watery eyes    Tramadol Itching       Problem List:    Patient Active Problem List   Diagnosis Code    Neuropathic pain M79.2    Lumbar spondylosis M47.816    Osteoarthritis M19.90    Insomnia G47.00    Fibromyalgia M79.7    Vitamin D deficiency E55.9    Essential hypertension I10    Allergic rhinitis J30.9    Lumbar radiculopathy M54.16    Osteoarthritis of spine with radiculopathy, lumbar region M47.26    Class 1 obesity in adult E66.9    Lumbar disc herniation M51.26    Type 2 diabetes mellitus ([facility] [address] E11.9    Primary osteoarthritis of left hip M16.12    Primary osteoarthritis of right hip M16.11    Cellulitis of foot L03.119    Neuropathy G62.9    Cellulitis, wound, post-operative T81.49XA    Left leg swelling M79.89    SIRS (systemic inflammatory response syndrome) ([facility]) R65.10    Cellulitis of sacral region L03.319    Rectus diastasis M62.08    Recurrent unilateral inguinal hernia K40.91    Enterocolitis K52.9    Decubitus ulcer of sacral area L89.159    Abnormal findings on diagnostic imaging of gall bladder R93.2    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    Cyst of spleen D73.4    Splenic abscess D73.3    Anemia D64.9    Pulmonary embolism ([facility]) I26.99    Thrombocytosis ([facility]) D47.3    Acute GI bleeding K92.2    Acute blood loss anemia D62       Past Medical History:        Diagnosis Date    Accident 11/2019    stepped on nail rt ft about 1 month ago- 1550   BP: 90/77 100/60 103/62 112/80   Pulse: 105 103 103 124   Resp: 20 20 20 22   Temp: 36.5 °C (97.7 °F)  36.5 °C (97.7 °F) 36.1 °C (97 °F)   TempSrc: Temporal   Temporal   SpO2: 96%      Weight:       Height:                                                  BP Readings from Last 3 Encounters:   09/04/20 112/80   08/11/20 128/74   05/28/20 (!) 135/98       NPO Status:                                                                                 BMI:   Wt Readings from Last 3 Encounters:   09/03/20 240 lb (108.9 kg)   08/11/20 239 lb 4 oz (108.5 kg)   05/28/20 (!) 315 lb (142.9 kg)     Body mass index is 32.55 kg/m². CBC:   Lab Results   Component Value Date    WBC 9.2 09/03/2020    RBC 3.08 09/03/2020    HGB 8.1 09/04/2020    HCT 26.8 09/04/2020    MCV 89.3 09/03/2020    RDW 17.2 09/03/2020     09/03/2020       CMP:   Lab Results   Component Value Date     09/04/2020    K 4.2 09/04/2020     09/04/2020    CO2 21 09/04/2020    BUN 14 09/04/2020    CREATININE 0.8 09/04/2020    GFRAA >60 09/04/2020    LABGLOM >60 09/04/2020    GLUCOSE 85 09/04/2020    GLUCOSE 125 05/11/2012    PROT 5.6 09/03/2020    CALCIUM 8.2 09/04/2020    BILITOT <0.2 09/03/2020    ALKPHOS 104 09/03/2020    AST 10 09/03/2020    ALT 8 09/03/2020       POC Tests: No results for input(s): POCGLU, POCNA, POCK, POCCL, POCBUN, POCHEMO, POCHCT in the last 72 hours.     Coags:   Lab Results   Component Value Date    PROTIME 13.3 09/03/2020    INR 1.2 09/03/2020    APTT 40.9 09/03/2020       HCG (If Applicable): No results found for: PREGTESTUR, PREGSERUM, HCG, HCGQUANT     ABGs: No results found for: PHART, PO2ART, BPV6OVU, DHQ5NUW, BEART, K9BGKNLG     Type & Screen (If Applicable):  No results found for: LABABO, LABRH    Drug/Infectious Status (If Applicable):  No results found for: HIV, HEPCAB    COVID-19 Screening (If Applicable):   Lab Results   Component Value Date    COVID19 Not Detected 09/04/2020         Anesthesia Evaluation  Patient summary reviewed and Nursing notes reviewed no history of anesthetic complications:   Airway: Mallampati: III  TM distance: >3 FB   Neck ROM: full  Mouth opening: > = 3 FB Dental:          Pulmonary:Negative Pulmonary ROS breath sounds clear to auscultation  (+) pneumonia (May 2020): resolved,                             Cardiovascular:    (+) hypertension:, past MI (Patient stated 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 GI/Hepatic/Renal:            ROS comment: Patient here for colonoscopy to find source of GI Bleed. Patient was on dialysis for one month after hospitalization and states his kidneys are back to normal function. .   Endo/Other:    (+) DiabetesType II DM, well controlled, using insulin, blood dyscrasia (Eliquis for Blood clots states he is not taking now.): anticoagulation therapy, arthritis: rheumatoid. , . Pt had no PAT visit       Abdominal:   (+) obese,         Vascular:   + PE (History of PE in May. ). Narrative    Patient MRN:  [id number]    : 1975    Age: 53 years    Gender: Male        Order Date:  2020 9:10 AM        EXAM: NM HEPATOBILIARY        NUMBER OF IMAGES:  10        INDICATION:  cholecystitis        COMPARISON: 2020        RADIOPHARMACEUTICAL ADMINISTERED:    9 mCi of Tc99m mebrofenin. TECHNIQUE:    The patient was injected with radiopharmaceutical. Sequential anterior    imaging of the abdomen was performed for 60 minutes.  Delayed anterior    and right lateral static images were obtained approximately 2 hours    after injection. FINDINGS:    There is prompt uptake of radiopharmaceutical in the liver. The small    bowel is subsequently visualized. The gallbladder is not seen on    initial or delayed images. Impression    Abnormal hepatobiliary scan with evidence of acute cholecystitis. Narrative & Impression      Transthoracic Echocardiography Report (TTE)      Demographics      Patient Name       Yanet Gavin   Gender               Male                      II      Medical Record     [id number]          Room Number          4429   Number      Account #          [de-identified]         Procedure Date       08/03/2020      Corporate ID                         Ordering Physician   Farrah James MD      Accession Number   [id number]        Referring Physician      Date of Birth      1975        Sonographer          Rosemarie HILL      Age                53 year(s)        Interpreting         Farrah James MD                                        Physician                                           Any Other     Procedure     Type of Study      TTE procedure:Echocardiogram W/Contrast.     Procedure Date  Date: 08/03/2020 Start: 02:38 PM     Study Location: Portable  Technical Quality: Limited visualization due to body habitus. Indications:Tachycardia. Patient Status: Routine     Contrast Medium: Definity. Amount - 2 ml     Contrast Comments: given by the floor rn. Height: 74 inches Weight: 248 pounds BSA: 2.38 m^2 BMI: 31.84 kg/m^2     Rhythm: Sinus tachycardia HR: 102 bpm BP: 111/64 mmHg      Findings      Left Ventricle   Micro-bubble contrast injected to enhance left ventricular visualization. Normal left ventricular size and systolic function. Ejection fraction is visually estimated at 60-65%. Normal diastolic function. No regional wall motion abnormalities seen. Mild left ventricular concentric hypertrophy noted.    Abnormal LV septal motion consistent with conduction disorder. Right Ventricle   Normal right ventricular size and function. Left Atrium   Normal sized left atrium. The interatrial septum appears intact. Right Atrium   Normal right atrial size. Mitral Valve   The mitral valve was not well visualized. No significant mitral valve stenosis. Physiologic mitral regurgitation. Tricuspid Valve   The tricuspid valve was not well visualized. Physiologic and/or trace tricuspid regurgitation. Aortic Valve   Individual aortic valve leaflets are not clearly visualized. No hemodynamically significant aortic stenosis is present. No evidence of aortic valve regurgitation. Pulmonic Valve   The pulmonic valve was not well visualized. No evidence of pulmonic valve stenosis. Physiologic and/or trace pulmonic regurgitation present. Pericardial Effusion   No evidence of pericardial effusion. Aorta   Aortic root dimension within normal limits. Miscellaneous   Normal Inferior Vena Cava diameter and respiratory variation. Conclusions      Summary   Technically difficult study - limited visualization. Micro-bubble contrast injected to enhance left ventricular visualization. Normal left ventricular size and systolic function. Ejection fraction is visually estimated at 60-65%. Normal diastolic function. No regional wall motion abnormalities seen. Mild left ventricular concentric hypertrophy noted. Abnormal LV septal motion consistent with conduction disorder. Normal right ventricular size and function. No significant valvular abnormalities.       Signature      ----------------------------------------------------------------   Electronically signed by Elisha Rob MD(Interpreting   physician) on 08/03/2020 03:30 PM  Narrative & Impression     Sinus tachycardia  Inferior infarct (cited on or before 02-AUG-2020)  Abnormal ECG  When compared with ECG of 02-AUG-2020 06:39,  HR has increased  Confirmed by Akhil Duffy ([id number]) on 8/6/2020 6:50:13 AM   Lab and Collection          Anesthesia Plan      MAC     ASA 4     (PIV#18 Left AC)  Induction: intravenous. Anesthetic plan and risks discussed with patient. Use of blood products discussed with patient whom consented to blood products. Plan discussed with CRNA and attending. Jelly Lofton RN   9/4/2020    Patient seen and examined, chart reviewed, agree with above findings. Anesthetic plan, risks, benefits, alternatives, and personnel involved discussed with patient. Patient verbalized an understanding and agreed to proceed. NPO status confirmed. Anesthetic plan discussed with care team members and agreed upon.     Bertha Villeda DO   9/5/2020  10:02 AM

## 2020-09-04 NOTE — ANESTHESIA PRE PROCEDURE
Department of Anesthesiology  Preprocedure Note       Name:  Michael Varner   Age:  53 y.o.  :  1975                                          MRN:  [id number]         Date:  2020      Surgeon: Luisito Villalta):  Saurabh Parra MD    Procedure: Procedure(s):  EGD BIOPSY    Medications prior to admission:   Prior to Admission medications    Medication Sig Start Date End Date Taking? Authorizing Provider   pregabalin (LYRICA) 300 MG capsule Take 300 mg by mouth 2 times daily.    Yes Historical Provider, MD   aspirin 81 MG chewable tablet Take 81 mg by mouth daily   Yes Historical Provider, MD   melatonin 3 MG TABS tablet Take 3 mg by mouth nightly as needed (sleep)   Yes Historical Provider, MD   ibuprofen (ADVIL;MOTRIN) 600 MG tablet Take 600 mg by mouth every 6 hours as needed for Pain   Yes Historical Provider, MD   lisinopril (PRINIVIL;ZESTRIL) 20 MG tablet Take 20 mg by mouth daily   Yes Historical Provider, MD   fluticasone (FLONASE) 50 MCG/ACT nasal spray 2 sprays by Nasal route daily   Yes Historical Provider, MD   aspirin 81 MG EC tablet Take 81 mg by mouth 2 times daily   Yes Historical Provider, MD   cyclobenzaprine (FLEXERIL) 10 MG tablet Take 10 mg by mouth three times daily   Yes Historical Provider, MD   fenofibrate (TRICOR) 54 MG tablet Take 54 mg by mouth daily   Yes Historical Provider, MD   ferrous sulfate (IRON 325) 325 (65 Fe) MG tablet Take 325 mg by mouth 2 times daily   Yes Historical Provider, MD   hydroxyurea (HYDREA) 500 MG chemo capsule Take 500 mg by mouth 2 times daily   Yes Historical Provider, MD   loperamide (IMODIUM) 2 MG capsule Take 2 mg by mouth 4 times daily as needed for Diarrhea   Yes Historical Provider, MD   nystatin (MYCOSTATIN) 922423 UNIT/GM powder Apply topically 2 times daily   Yes Historical Provider, MD   insulin lispro (HUMALOG) 100 UNIT/ML injection vial Inject into the skin 3 times daily (before meals) *Plus Sliding Scale*   Yes Historical Provider, MD apixaban (ELIQUIS) 5 MG TABS tablet Take 1 tablet by mouth 2 times daily 8/11/20  Yes Sussy Jorgensen MD   enoxaparin (LOVENOX) 120 MG/0.8ML injection Inject 120 mg into the skin every 12 hours    Yes Historical Provider, MD   insulin glargine (LANTUS) 100 UNIT/ML injection vial Inject 20 Units into the skin nightly   Yes Historical Provider, MD   oxyCODONE-acetaminophen (PERCOCET) 5-325 MG per tablet Take 1 tablet by mouth every 6 hours as needed for Pain.    Yes Historical Provider, MD   pantoprazole (PROTONIX) 40 MG tablet Take 40 mg by mouth daily   Yes Historical Provider, MD   DULoxetine (CYMBALTA) 30 MG extended release capsule Take 1 capsule by mouth daily 5/14/20  Yes IGOR Quesada CNP   pravastatin (PRAVACHOL) 20 MG tablet Take 1 tablet by mouth nightly 5/14/20  Yes IGOR Quesada CNP       Current medications:    Current Facility-Administered Medications   Medication Dose Route Frequency Provider Last Rate Last Dose    pantoprazole (PROTONIX) injection 40 mg  40 mg Intravenous BID Linnette Clarkdale, APRN - CNS   40 mg at 09/04/20 6186    And    sodium chloride (PF) 0.9 % injection 10 mL  10 mL Intravenous BID Linnette Clarkdale, APRN - CNS   10 mL at 09/03/20 2006    pregabalin (LYRICA) capsule 300 mg  300 mg Oral BID Linnette Clarkdale, APRN - CNS   300 mg at 09/03/20 2004    pravastatin (PRAVACHOL) tablet 20 mg  20 mg Oral Nightly Linnette Clarkdale, APRN - CNS   20 mg at 09/03/20 2002    oxyCODONE-acetaminophen (PERCOCET) 5-325 MG per tablet 1 tablet  1 tablet Oral Q6H PRN Linnette Clarkdale, APRN - CNS   1 tablet at 09/04/20 0931    miconazole (MICOTIN) 2 % powder   Topical BID Linnette Clarkdale, APRN - CNS        melatonin tablet 3 mg  3 mg Oral Nightly PRN Linnette Clarkdale, APRN - CNS        insulin glargine (LANTUS) injection vial 20 Units  20 Units Subcutaneous Nightly Linnette Clarkdale, APRN - CNS   20 Units at 09/03/20 2002    hydroxyurea (HYDREA) chemo capsule 500 mg 500 mg Oral BID Sherryll Eye, APRN - CNS   500 mg at 09/03/20 1956    fluticasone (FLONASE) 50 MCG/ACT nasal spray 2 spray  2 spray Nasal Daily Sherryll Eye, APRN - CNS   2 spray at 09/03/20 1254    fenofibrate (TRICOR) tablet 54 mg  54 mg Oral Daily Sherryll Eye, APRN - CNS   54 mg at 09/03/20 1419    DULoxetine (CYMBALTA) extended release capsule 30 mg  30 mg Oral Daily Sherryll Eye, APRN - CNS   30 mg at 09/03/20 1258    cyclobenzaprine (FLEXERIL) tablet 10 mg  10 mg Oral TID Sherryll Eye, APRN - CNS   Stopped at 09/04/20 0911    sodium chloride flush 0.9 % injection 10 mL  10 mL Intravenous 2 times per day Sherryll Eye, APRN - CNS   10 mL at 09/04/20 0687    sodium chloride flush 0.9 % injection 10 mL  10 mL Intravenous PRN Sherryll Eye, APRN - CNS        acetaminophen (TYLENOL) tablet 650 mg  650 mg Oral Q6H PRN Sherryll Eye, APRN - CNS        Or    acetaminophen (TYLENOL) suppository 650 mg  650 mg Rectal Q6H PRN Sherryll Eye, APRN - CNS        polyethylene glycol (GLYCOLAX) packet 17 g  17 g Oral Daily PRN Sherryll Eye, APRN - CNS        promethazine (PHENERGAN) tablet 12.5 mg  12.5 mg Oral Q6H PRN Sherryll Eye, APRN - CNS        Or    ondansetron (ZOFRAN) injection 4 mg  4 mg Intravenous Q6H PRN Sherryll Eye, APRN - CNS        influenza quadrivalent split vaccine (FLUZONE;FLUARIX;FLULAVAL;AFLURIA) injection 0.5 mL  0.5 mL Intramuscular Prior to discharge Shari Del Real DO        insulin lispro (HUMALOG) injection vial 0-6 Units  0-6 Units Subcutaneous TID WC Sherryll Eye, APRN - CNS   Stopped at 09/03/20 1818    insulin lispro (HUMALOG) injection vial 0-3 Units  0-3 Units Subcutaneous Nightly Sherryll Eye, APRN - CNS   1 Units at 09/03/20 2003    glucose (GLUTOSE) 40 % oral gel 15 g  15 g Oral PRN Sherryll Eye, APRN - CNS        dextrose 50 % IV solution  12.5 g Intravenous PRN Cory Eye, APRN - CNS  glucagon (rDNA) injection 1 mg  1 mg Intramuscular PRN IGOR Alfredo        dextrose 5 % solution  100 mL/hr Intravenous PRN IGOR Alfredo        0.9 % sodium chloride infusion   Intravenous Continuous Ricarda Palacio  mL/hr at 09/04/20 7728      ferrous sulfate (IRON 325) tablet 325 mg  325 mg Oral BID Ricarda Palacio MD   325 mg at 09/03/20 2002       Allergies:     Allergies   Allergen Reactions    Seasonal      HAYFEVER / sneezing,watery eyes    Tramadol Itching       Problem List:    Patient Active Problem List   Diagnosis Code    Neuropathic pain M79.2    Lumbar spondylosis M47.816    Osteoarthritis M19.90    Insomnia G47.00    Fibromyalgia M79.7    Vitamin D deficiency E55.9    Essential hypertension I10    Allergic rhinitis J30.9    Lumbar radiculopathy M54.16    Osteoarthritis of spine with radiculopathy, lumbar region M47.26    Class 1 obesity in adult E66.9    Lumbar disc herniation M51.26    Type 2 diabetes mellitus ([facility] [address] E11.9    Primary osteoarthritis of left hip M16.12    Primary osteoarthritis of right hip M16.11    Cellulitis of foot L03.119    Neuropathy G62.9    Cellulitis, wound, post-operative T81.49XA    Left leg swelling M79.89    SIRS (systemic inflammatory response syndrome) ([facility]) R65.10    Cellulitis of sacral region L03.319    Rectus diastasis M62.08    Recurrent unilateral inguinal hernia K40.91    Enterocolitis K52.9    Decubitus ulcer of sacral area L89.159    Abnormal findings on diagnostic imaging of gall bladder R93.2    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    Cyst of spleen D73.4    Splenic abscess D73.3    Anemia D64.9    Pulmonary embolism (HCC) I26.99    Thrombocytosis ([facility]) D47.3    Acute GI bleeding K92.2    Acute blood loss anemia D62       Past Medical History:        Diagnosis Date    Accident 11/2019    stepped on nail rt ft about 1 month ago- healed per patient    SIMÓN (acute kidney injury) ([facility] [address] 2008 apx kidney bruised due to fall / Bertis Luz    Allergic rhinitis     Chronic back pain     Depression     Diabetes mellitus ([address]     Difficulty sleeping     at times    Displacement of lumbar intervertebral disc without myelopathy     Fibromyalgia     Fractured rib     2008 / healed    H/O seasonal allergies     Head injury 1980'S apx    no residual s/s    Hyperlipidemia     Hypertension     Obesity (BMI 35.0-39.9 without comorbidity)     bmi 39.2  weight 296 #    Osteoarthritis     Thoracic or lumbosacral neuritis or radiculitis, unspecified        Past Surgical History:        Procedure Laterality Date    CT PTC NEW ACCESS  8/3/2020    CT PTC NEW ACCESS 8/3/2020 [facility] CT    FOOT SURGERY Right 1985    to treat shattered bones    HERNIA REPAIR  2001    DOUBLE HERNIA    HERNIA REPAIR Right 12/9/2019    LAPAROSCOPIC RIGHT INGUINAL HERNIA REPAIR, MESH 10x15 cm PLACEMENT performed by Nhi Valenzuela MD at [facility] Left 4/11/2016    NECK SURGERY  2000    FUSION    CA COLONOSCOPY FLX DX W/COLLJ SPEC WHEN PFRMD N/A 5/7/2018    COLONOSCOPY DIAGNOSTIC performed by Amanda Whitaker MD at [facility] Left 2014    Dr. Craig [place] Left 11 21 14    SHOULDER SURGERY  2001 &2007    RIGHT AND LEFT repair of tears    TOTAL HIP ARTHROPLASTY Right 10/31/2016    Total right hip  MD Guy Bowerse Moo WRIST SURGERY  2007    LEFT WRIST       Social History:    Social History     Tobacco Use    Smoking status: Never Smoker    Smokeless tobacco: Current User     Types: Chew   Substance Use Topics    Alcohol use: Not Currently     Alcohol/week: 0.0 standard drinks     Frequency: Monthly or less                                Ready to quit: Not Answered  Counseling given: Not Answered      Vital Signs (Current):   Vitals:    09/03/20 2300 09/04/20 0315 09/04/20 0805 09/04/20 0931   BP: (!) 99/56 (!) 100/56 (!) 92/48 (!) 102/40   Pulse: 117  93 anesthetic complications:   Airway: Mallampati: III  TM distance: >3 FB   Neck ROM: full  Mouth opening: > = 3 FB Dental:          Pulmonary:Negative Pulmonary ROS breath sounds clear to auscultation                             Cardiovascular:    (+) hypertension:, hyperlipidemia      ECG reviewed  Rhythm: regular  Rate: normal                    Neuro/Psych:   (+) neuromuscular disease (fibromyalgia):, psychiatric history:            GI/Hepatic/Renal:   (+) renal disease: ARF,          ROS comment: Gi bleed. Endo/Other:    (+) DiabetesType II DM, , blood dyscrasia: anticoagulation therapy and anemia, arthritis: OA., .                  ROS comment: Cellulitis  Abdominal:   (+) obese,         Vascular:   + PE. Anesthesia Plan      MAC     ASA 3       Induction: intravenous. Anesthetic plan and risks discussed with patient. Plan discussed with CRNA and attending. Sera Vázquez RN   9/4/2020      Patient seen and examined, chart reviewed, agree with above findings. Anesthetic plan, risks, benefits, alternatives, and personnel involved discussed with patient. Patient verbalized an understanding and agreed to proceed. NPO status confirmed. Anesthetic plan discussed with care team members and agreed upon.     Bertha Villeda DO   9/4/2020  1:26 PM

## 2020-09-04 NOTE — PROGRESS NOTES
Physical Therapy    PT order received and medical chart reviewed 9/4 PM. Pt off unit. Will re-attempt at a later time/date. Thank you.     Liliya Herron, PT, DPT  [id number]

## 2020-09-04 NOTE — FLOWSHEET NOTE
Healing % 96   Wound Assessment Pink;Yellow   Drainage Amount None   Loli-wound Assessment Pink   Wound 08/05/20 Buttocks Left; Inner   Date First Assessed/Time First Assessed: 08/05/20 1050   Present on Hospital Admission: Yes  Location: Buttocks  Wound Location Orientation: Left; Inner   Wound Image   (see photo for right buttock)   Dressing Changed Changed/New   Dressing/Treatment ABD; Alginate   Wound Cleansed Rinsed/Irrigated with saline   Wound Length (cm) 0.5 cm   Wound Width (cm) 0.5 cm   Wound Depth (cm) 0.1 cm   Wound Surface Area (cm^2) 0.25 cm^2   Change in Wound Size % (l*w) 97.92   Wound Volume (cm^3) 0.02 cm^3   Wound Healing % 98   Wound Assessment Yellow;Pink   Drainage Amount None   Loli-wound Assessment Pink      **Informed Consent**    The patient has given verbal consent to have photos taken of wounds and inserted into their chart as part of their permanent medical record for purposes of documentation, treatment management and/or medical review. All Images taken on 9/4/20 of patient name: Kristofer Manners were transmitted and stored on secured [employer] located within [facility] by a registered [employer] Mobile Application Device. Impression:  Sacrum: stage 4 pressure injury    Plan: aquacel rope/ABD pad  Patient to have colonoscopy tomorrow. Patient will need continued preventative care.       Darya Sanford 9/4/2020 6:05 PM

## 2020-09-04 NOTE — CARE COORDINATION
SOCIAL WORK/DISCHARGE PLANNING;  Pt was admitted from [facility] Per pt, his plan is to return to [facility] when medically ready. I spoke with Jocelyn at [facility]. She reported that pt is a bed hold and can return when medically ready. He is [employer] and will need insurance pre-cert. I asked her to start that today(awaiting P.T eval/OT in chart). Per Jocelyn,we will not have to wait for pre-cert to come back for pt to return. Pt will also need co-vid test prior to returning. Will provide unit with dry covid test.Completed envelope in chart.   Lynnette Ramires [facility]  [phone]

## 2020-09-05 ENCOUNTER — ANESTHESIA (OUTPATIENT)
Dept: ENDOSCOPY | Age: 45
DRG: 241 | End: 2020-09-05
Payer: COMMERCIAL

## 2020-09-05 ENCOUNTER — APPOINTMENT (OUTPATIENT)
Dept: NUCLEAR MEDICINE | Age: 45
DRG: 241 | End: 2020-09-05
Payer: COMMERCIAL

## 2020-09-05 VITALS
RESPIRATION RATE: 11 BRPM | DIASTOLIC BLOOD PRESSURE: 55 MMHG | SYSTOLIC BLOOD PRESSURE: 101 MMHG | OXYGEN SATURATION: 100 %

## 2020-09-05 LAB
HCT VFR BLD CALC: 26.7 % (ref 37–54)
HEMOGLOBIN: 8 G/DL (ref 12.5–16.5)
METER GLUCOSE: 125 MG/DL (ref 74–99)
METER GLUCOSE: 81 MG/DL (ref 74–99)

## 2020-09-05 PROCEDURE — 45380 COLONOSCOPY AND BIOPSY: CPT | Performed by: SURGERY

## 2020-09-05 PROCEDURE — 85018 HEMOGLOBIN: CPT

## 2020-09-05 PROCEDURE — 6370000000 HC RX 637 (ALT 250 FOR IP): Performed by: STUDENT IN AN ORGANIZED HEALTH CARE EDUCATION/TRAINING PROGRAM

## 2020-09-05 PROCEDURE — 36415 COLL VENOUS BLD VENIPUNCTURE: CPT

## 2020-09-05 PROCEDURE — 6360000002 HC RX W HCPCS: Performed by: NURSE ANESTHETIST, CERTIFIED REGISTERED

## 2020-09-05 PROCEDURE — 2060000000 HC ICU INTERMEDIATE R&B

## 2020-09-05 PROCEDURE — 7100000000 HC PACU RECOVERY - FIRST 15 MIN: Performed by: SURGERY

## 2020-09-05 PROCEDURE — 78278 ACUTE GI BLOOD LOSS IMAGING: CPT

## 2020-09-05 PROCEDURE — 82962 GLUCOSE BLOOD TEST: CPT

## 2020-09-05 PROCEDURE — 6370000000 HC RX 637 (ALT 250 FOR IP): Performed by: CLINICAL NURSE SPECIALIST

## 2020-09-05 PROCEDURE — 3700000001 HC ADD 15 MINUTES (ANESTHESIA): Performed by: SURGERY

## 2020-09-05 PROCEDURE — 3700000000 HC ANESTHESIA ATTENDED CARE: Performed by: SURGERY

## 2020-09-05 PROCEDURE — C9113 INJ PANTOPRAZOLE SODIUM, VIA: HCPCS | Performed by: STUDENT IN AN ORGANIZED HEALTH CARE EDUCATION/TRAINING PROGRAM

## 2020-09-05 PROCEDURE — 88305 TISSUE EXAM BY PATHOLOGIST: CPT

## 2020-09-05 PROCEDURE — 2580000003 HC RX 258: Performed by: INTERNAL MEDICINE

## 2020-09-05 PROCEDURE — 3430000000 HC RX DIAGNOSTIC RADIOPHARMACEUTICAL: Performed by: RADIOLOGY

## 2020-09-05 PROCEDURE — 99232 SBSQ HOSP IP/OBS MODERATE 35: CPT | Performed by: SURGERY

## 2020-09-05 PROCEDURE — 85014 HEMATOCRIT: CPT

## 2020-09-05 PROCEDURE — 0DBK8ZX EXCISION OF ASCENDING COLON, VIA NATURAL OR ARTIFICIAL OPENING ENDOSCOPIC, DIAGNOSTIC: ICD-10-PCS | Performed by: STUDENT IN AN ORGANIZED HEALTH CARE EDUCATION/TRAINING PROGRAM

## 2020-09-05 PROCEDURE — 2580000003 HC RX 258: Performed by: NURSE ANESTHETIST, CERTIFIED REGISTERED

## 2020-09-05 PROCEDURE — C9113 INJ PANTOPRAZOLE SODIUM, VIA: HCPCS | Performed by: CLINICAL NURSE SPECIALIST

## 2020-09-05 PROCEDURE — 6370000000 HC RX 637 (ALT 250 FOR IP): Performed by: INTERNAL MEDICINE

## 2020-09-05 PROCEDURE — 6360000002 HC RX W HCPCS: Performed by: CLINICAL NURSE SPECIALIST

## 2020-09-05 PROCEDURE — 2580000003 HC RX 258: Performed by: STUDENT IN AN ORGANIZED HEALTH CARE EDUCATION/TRAINING PROGRAM

## 2020-09-05 PROCEDURE — 3609010300 HC COLONOSCOPY W/BIOPSY SINGLE/MULTIPLE: Performed by: SURGERY

## 2020-09-05 PROCEDURE — 2709999900 HC NON-CHARGEABLE SUPPLY: Performed by: SURGERY

## 2020-09-05 PROCEDURE — A9560 TC99M LABELED RBC: HCPCS | Performed by: RADIOLOGY

## 2020-09-05 PROCEDURE — 6360000002 HC RX W HCPCS: Performed by: STUDENT IN AN ORGANIZED HEALTH CARE EDUCATION/TRAINING PROGRAM

## 2020-09-05 PROCEDURE — 7100000001 HC PACU RECOVERY - ADDTL 15 MIN: Performed by: SURGERY

## 2020-09-05 RX ORDER — INSULIN GLARGINE 100 [IU]/ML
5 INJECTION, SOLUTION SUBCUTANEOUS NIGHTLY
Status: DISCONTINUED | OUTPATIENT
Start: 2020-09-05 | End: 2020-09-06

## 2020-09-05 RX ORDER — SODIUM CHLORIDE 9 MG/ML
INJECTION, SOLUTION INTRAVENOUS CONTINUOUS PRN
Status: DISCONTINUED | OUTPATIENT
Start: 2020-09-05 | End: 2020-09-05 | Stop reason: SDUPTHER

## 2020-09-05 RX ORDER — PROPOFOL 10 MG/ML
INJECTION, EMULSION INTRAVENOUS PRN
Status: DISCONTINUED | OUTPATIENT
Start: 2020-09-05 | End: 2020-09-05 | Stop reason: SDUPTHER

## 2020-09-05 RX ADMIN — PHENYLEPHRINE HYDROCHLORIDE 100 MCG: 10 INJECTION INTRAVENOUS at 10:16

## 2020-09-05 RX ADMIN — OXYCODONE AND ACETAMINOPHEN 1 TABLET: 5; 325 TABLET ORAL at 20:44

## 2020-09-05 RX ADMIN — FERROUS SULFATE TAB 325 MG (65 MG ELEMENTAL FE) 325 MG: 325 (65 FE) TAB at 20:44

## 2020-09-05 RX ADMIN — OXYCODONE AND ACETAMINOPHEN 1 TABLET: 5; 325 TABLET ORAL at 14:34

## 2020-09-05 RX ADMIN — PHENYLEPHRINE HYDROCHLORIDE 100 MCG: 10 INJECTION INTRAVENOUS at 10:25

## 2020-09-05 RX ADMIN — PROPOFOL 700 MG: 10 INJECTION, EMULSION INTRAVENOUS at 10:12

## 2020-09-05 RX ADMIN — SODIUM CHLORIDE: 0.9 INJECTION, SOLUTION INTRAVENOUS at 10:10

## 2020-09-05 RX ADMIN — PANTOPRAZOLE SODIUM 40 MG: 40 INJECTION, POWDER, FOR SOLUTION INTRAVENOUS at 09:26

## 2020-09-05 RX ADMIN — PRAVASTATIN SODIUM 20 MG: 20 TABLET ORAL at 20:44

## 2020-09-05 RX ADMIN — SODIUM CHLORIDE: 9 INJECTION, SOLUTION INTRAVENOUS at 06:29

## 2020-09-05 RX ADMIN — INSULIN GLARGINE 5 UNITS: 100 INJECTION, SOLUTION SUBCUTANEOUS at 20:45

## 2020-09-05 RX ADMIN — PANTOPRAZOLE SODIUM 40 MG: 40 INJECTION, POWDER, FOR SOLUTION INTRAVENOUS at 20:43

## 2020-09-05 RX ADMIN — SODIUM CHLORIDE, PRESERVATIVE FREE 10 ML: 5 INJECTION INTRAVENOUS at 20:43

## 2020-09-05 RX ADMIN — PHENYLEPHRINE HYDROCHLORIDE 100 MCG: 10 INJECTION INTRAVENOUS at 10:31

## 2020-09-05 RX ADMIN — HYDROXYUREA 500 MG: 500 CAPSULE ORAL at 20:44

## 2020-09-05 RX ADMIN — Medication 25 MILLICURIE: at 12:40

## 2020-09-05 RX ADMIN — CYCLOBENZAPRINE 10 MG: 10 TABLET, FILM COATED ORAL at 20:44

## 2020-09-05 RX ADMIN — PREGABALIN 300 MG: 150 CAPSULE ORAL at 20:43

## 2020-09-05 RX ADMIN — CYCLOBENZAPRINE 10 MG: 10 TABLET, FILM COATED ORAL at 14:34

## 2020-09-05 RX ADMIN — OXYCODONE AND ACETAMINOPHEN 1 TABLET: 5; 325 TABLET ORAL at 04:00

## 2020-09-05 ASSESSMENT — PAIN DESCRIPTION - PAIN TYPE
TYPE: ACUTE PAIN

## 2020-09-05 ASSESSMENT — PAIN DESCRIPTION - DESCRIPTORS
DESCRIPTORS: ACHING;DISCOMFORT;SORE
DESCRIPTORS: ACHING;CONSTANT;DISCOMFORT
DESCRIPTORS: ACHING;CONSTANT;DISCOMFORT

## 2020-09-05 ASSESSMENT — PAIN SCALES - GENERAL
PAINLEVEL_OUTOF10: 0
PAINLEVEL_OUTOF10: 10
PAINLEVEL_OUTOF10: 5
PAINLEVEL_OUTOF10: 10
PAINLEVEL_OUTOF10: 10
PAINLEVEL_OUTOF10: 0

## 2020-09-05 ASSESSMENT — PAIN DESCRIPTION - FREQUENCY
FREQUENCY: CONTINUOUS
FREQUENCY: CONTINUOUS

## 2020-09-05 ASSESSMENT — PAIN DESCRIPTION - ONSET
ONSET: ON-GOING
ONSET: ON-GOING

## 2020-09-05 ASSESSMENT — PAIN - FUNCTIONAL ASSESSMENT
PAIN_FUNCTIONAL_ASSESSMENT: PREVENTS OR INTERFERES SOME ACTIVE ACTIVITIES AND ADLS
PAIN_FUNCTIONAL_ASSESSMENT: PREVENTS OR INTERFERES SOME ACTIVE ACTIVITIES AND ADLS

## 2020-09-05 ASSESSMENT — PAIN DESCRIPTION - LOCATION
LOCATION: GENERALIZED

## 2020-09-05 ASSESSMENT — PAIN DESCRIPTION - PROGRESSION
CLINICAL_PROGRESSION: GRADUALLY IMPROVING
CLINICAL_PROGRESSION: NOT CHANGED

## 2020-09-05 NOTE — ANESTHESIA POSTPROCEDURE EVALUATION
Department of Anesthesiology  Postprocedure Note    Patient: David Grace  MRN: [id number]  YOB: 1975  Date of evaluation: 9/5/2020  Time:  5:18 PM     Procedure Summary     Date:  09/05/20 Room / Location:  [address] [place] [address] / [facility]    Anesthesia Start:  1010 Anesthesia Stop:  7708    Procedure:  COLONOSCOPY WITH BIOPSY (N/A ) Diagnosis:  (.)    Surgeon:  Irma Melgar MD Responsible Provider:  Kathia Luther DO    Anesthesia Type:  MAC ASA Status:  4          Anesthesia Type: MAC    Cristal Phase I: Cristal Score: 10    Cristal Phase II:      Last vitals: Reviewed and per EMR flowsheets.        Anesthesia Post Evaluation    Patient location during evaluation: PACU  Patient participation: complete - patient participated  Level of consciousness: awake and alert  Pain score: 1  Airway patency: patent  Nausea & Vomiting: no nausea and no vomiting  Complications: no  Cardiovascular status: hemodynamically stable  Respiratory status: acceptable  Hydration status: euvolemic

## 2020-09-05 NOTE — OP NOTE
Operative Note      Patient: Nolan Valadez II  YOB: 1975  MRN: [id number]    Date of Procedure: 9/5/2020    Pre-Op Diagnosis: GI bleed    Post-Op Diagnosis: Same       Procedure(s):  COLONOSCOPY DIAGNOSTIC with biopsy    Surgeon(s):  Jordan Li MD    Assistant:   Resident: Natasha Hyman MD    Anesthesia: Monitor Anesthesia Care    Estimated Blood Loss (mL): 5 mL    Complications: None    Specimens:   ID Type Source Tests Collected by Time Destination   A : cecal lesion  Tissue Tissue SURGICAL PATHOLOGY Jordan Li MD 9/5/2020 1035        Implants:  * No implants in log *      Drains:   Closed/Suction Drain Right RUQ Bulb 10 Irish (Active)   Site Description Healing 09/03/20 1333   Dressing Status Clean;Dry; Intact 09/03/20 1333   Drainage Appearance Bile;Green 09/03/20 1333   Status Compressed 09/03/20 1333   Output (ml) 275 ml 09/05/20 0635       [REMOVED] Urethral Catheter Straight-tip 16 fr (Removed)   $ Urethral catheter insertion $ Not inserted for procedure 08/01/20 2340   Catheter Indications Need for fluid management in critically ill patients in a critical care setting not able to be managed by other means such as BSC with hat, bedpan, urinal, condom catheter, or short term intermittent urethral catherization 08/04/20 0600   Site Assessment No urethral drainage 08/04/20 0600   Urine Color Yellow 08/04/20 0600   Urine Appearance Clear 08/04/20 0600   Output (mL) 75 mL 08/04/20 0600       Findings: Cecal mass. Melena at cecum and throughout colon. Detailed Description of Procedure: The patient was given IV conscious sedation per anesthesia. The patient was given supplemental oxygen by nasal cannula. I performed a digital rectal exam and no masses were palpated. The colonoscope was inserted per rectum and advanced under direct vision to the cecum without difficulty. The prep was good so exam was adequate.     FINDINGS:  Cecum/Ascending colon: appendiceal orifice noted, iliocecal valve visualized, with a large, friable, non-bleeding mass. Biopsy were taken. Melena noted within cecum. Transverse colon: Normal    Descending/Sigmoid colon: Normal    Rectum/Anus: examined in normal and retroflexed positions and was normal    The colon was decompressed and the scope was removed. The withdraw time was approximately 7 minutes. The patient tolerated the procedure well. ASSESSMENT/PLAN:   1. Obtain bleeding scan to rule out small bowel source of bleeding  2. Diet as tolerated  3.  Await biopsy results    Electronically signed by Joselo Escamilla MD on 9/5/2020 at 10:46 AM

## 2020-09-05 NOTE — CONSULTS
Comprehensive Nutrition Assessment    Type and Reason for Visit:  Initial, Wound, Consult    Nutrition Recommendations/Plan: Continue NPO pending csope. When diet able to be advanced will add ONS. Continue to monitor/follow. Nutrition Assessment:  Pt w/ increased nutrient needs 2/2 wounds w/ noted stg IV pressure to buttocks. Will add ONS w/diet advancement. Pt currently pending Csope. Noted s/p EGD 2/2 admit w/ GIB    Malnutrition Assessment:  Malnutrition Status:  Insufficient data    Context:  Acute Illness     Findings of the 6 clinical characteristics of malnutrition:  Energy Intake:  Unable to assess(NPO since admit)  Weight Loss:  Unable to assess(no updated wt on file)     Body Fat Loss:  Unable to assess(D/t PPE shortages)     Muscle Mass Loss:  Unable to assess    Fluid Accumulation:  No significant fluid accumulation     Strength:  Not Performed    Estimated Daily Nutrient Needs:  Energy (kcal):  15-18= 6239-0447; Weight Used for Energy Requirements:  Current     Protein (g):  1.5-1.8 130-140; Weight Used for Protein Requirements:  Ideal        Fluid (ml/day):  1700ml; Weight Used for Fluid Requirements:  Current      Nutrition Related Findings:  +i/os, distended abd, present bs, no edema noted      Wounds:  Stage IV       Current Nutrition Therapies:    Diet NPO Effective Now Exceptions are: Sips with Meds    Anthropometric Measures:  · Height: 6' (182.9 cm)  · Current Body Weight: 240 lb (108.9 kg)   · Admission Body Weight:      · Usual Body Weight: 297 lb (134.7 kg)(10/2019 per EMR)     · Ideal Body Weight: 178 lbs; % Ideal Body Weight 134.8 %   · BMI: 32.5  · BMI Categories: Obese Class 1 (BMI 30.0-34. 9)       Nutrition Diagnosis:   · Increased nutrient needs related to increase demand for energy/nutrients as evidenced by wounds      Nutrition Interventions:   Food and/or Nutrient Delivery:  Continue NPO  Nutrition Education/Counseling:  No recommendation at this time   Coordination

## 2020-09-05 NOTE — PROGRESS NOTES
General Surgery Progress Note:    CC: GIB    S: feels ok no obvious bleeding danielle prep       Objective:  @/64   Pulse 110   Temp 97 °F (36.1 °C) (Temporal)   Resp 18   Ht 6' (1.829 m)   Wt 240 lb (108.9 kg)   SpO2 95%   BMI 32.55 kg/m² @    Physical -     Gen: NAD  Resp: Breathing Comfortably, no resp distress  CV: Reg Rate  Abd: SNT   EXT nvi    Assessment/Plan: 54 yo with anemia     C scope later today     Paige Goldstein MD FACS     9:51 AM

## 2020-09-05 NOTE — ANESTHESIA POSTPROCEDURE EVALUATION
Department of Anesthesiology  Postprocedure Note    Patient: Laurel Ng  MRN: [id number]  [place]: 1975  Date of evaluation: 9/5/2020  Time:  12:48 PM     Procedure Summary     Date:  09/04/20 Room / Location:  [facility] / [facility]    Anesthesia Start:  6302 Anesthesia Stop:  1203    Procedure:  EGD DIAGNOSTIC ONLY (N/A ) Diagnosis:  (/)    Surgeon:  Mami Macdonald MD Responsible Provider:  Chica Rice DO    Anesthesia Type:  MAC ASA Status:  3          Anesthesia Type: MAC    Cristal Phase I: Cristal Score: 10    Cristal Phase II:      Last vitals: Reviewed and per EMR flowsheets.        Anesthesia Post Evaluation    Patient location during evaluation: PACU  Patient participation: complete - patient participated  Level of consciousness: awake and alert  Pain score: 1  Airway patency: patent  Nausea & Vomiting: no nausea and no vomiting  Complications: no  Cardiovascular status: hemodynamically stable  Respiratory status: acceptable  Hydration status: euvolemic

## 2020-09-05 NOTE — PROGRESS NOTES
Hospitalist Progress Note      SYNOPSIS: Patient admitted on 9/3/2020 for       SUBJECTIVE:    Patient seen and examined  Records reviewed. Upon evaluation patient was lying on the bed. No supplemental oxygen required. No new major complaints. He stated that he feels better. Patient denied chest pain, shortness of breath and palpitation. No cough and wheezing. No fever, chills or rigors. No vomiting,abdominal pain and diarrhea. No urinary complaints. Stable overnight. No other overnight issues reported. Temp (24hrs), Av °F (36.1 °C), Min:96.3 °F (35.7 °C), Max:97.6 °F (36.4 °C)    DIET: Diet NPO Effective Now Exceptions are: Sips with Meds  CODE: Full Code    Intake/Output Summary (Last 24 hours) at 2020 1456  Last data filed at 2020 1041  Gross per 24 hour   Intake 3395 ml   Output 535 ml   Net 2860 ml       OBJECTIVE:    /68   Pulse 102   Temp 97.6 °F (36.4 °C) (Temporal)   Resp 16   Ht 6' (1.829 m)   Wt 240 lb (108.9 kg)   SpO2 99%   BMI 32.55 kg/m²     General appearance: No apparent distress, appears stated age and cooperative. HEENT:  Conjunctivae/corneas clear. Neck: Supple. No jugular venous distention. Respiratory: Clear to auscultation bilaterally, normal respiratory effort  Cardiovascular: Regular rate rhythm, normal S1-S2  Abdomen: Soft, nontender, non distended. Cholecystomy tube in place. Musculoskeletal: No clubbing, cyanosis, no bilateral lower extremity edema. Brisk capillary refill. Skin:Sacral decubitus wound present. Neurologic: awake, alert and following commands     ASSESSMENT:  1. Suspected PUD/GI bleeding:patient is on PPI. Apixaban and ASA are held. Patient did undergo  EGD on  which only showed minimal gastritis. Colonscopy on  showed Cecal mass and melena at Cecum and throughout the colon. Bleeding scan ordered per the surgical team to r/o small bowel GI bleeding.   2. Anemia of suspected acute blood loss in the setting of underlying anemia of chronic disease:H/H serially monitored and no notable drop noted. Will continue to monitor the H/H on consistent bases. 3. Hypotension: Due to dehydration and also possible volume loss from draining cholecystostomy tube. BP improved to WNL with IVF blouses and maintenance IVF. Lisinopril discontinued. Monitor the vital signs closely. Will obtain Lactate. 4. Type II DM with long-term insulin use and with complication: On Lantus and sliding scale. Blood sugar running low and thus Lantus dose reduced. ADA diet. Insulin dose to be adjusted per the BS. 5. Diabetic neuropathy:on Lyrica. 6. Hyperlipidemia, mixed:on Statin and Fenofibrate. 7. Hx of PE: Apixaban was on hold because of suspected GI bleed. 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 Apixaban to be resumed when OK with the surgical team.   8. Hx of Thrombocytosis:on Hydroxyurea. 9. Hx of  Splenic thrombosis: Apixaban to be resumed as soon the GI work up is completed and when OK with the surgical team.  10. Hx of splenic infarction: This was likely to be due to splenic thrombosis  11. Sacral decubitus wound, stage II/III,present on admission: Continue skin and wound care. 12. Hx of acute cholecystitis:Currently patient has cholecystomy tube placement  13. Obesity with BMI of 32.55: Patient sho benefit from lifestyle modification for weight loss  14. Osteoarthritis of hip joint is status post replacement: Continue symptomatic management  15. DVT prophylaxis:SCD. Apixaban on hold at present. DISPOSITION: Anticipate  possible discharge in two to three days to ECF .   Medications:  REVIEWED DAILY    Infusion Medications    dextrose      sodium chloride 125 mL/hr at 09/05/20 0607     Scheduled Medications    insulin glargine  10 Units Subcutaneous Nightly    apixaban  5 mg Oral BID    pantoprazole  40 mg Intravenous BID    And    sodium chloride (PF)  10 mL Intravenous BID    pregabalin  300 mg Oral BID    pravastatin  20 mg Oral Nightly    miconazole   Topical BID    hydroxyurea  500 mg Oral BID    fluticasone  2 spray Nasal Daily    fenofibrate  54 mg Oral Daily    DULoxetine  30 mg Oral Daily    cyclobenzaprine  10 mg Oral TID    sodium chloride flush  10 mL Intravenous 2 times per day    influenza virus vaccine  0.5 mL Intramuscular Prior to discharge    insulin lispro  0-6 Units Subcutaneous TID WC    insulin lispro  0-3 Units Subcutaneous Nightly    ferrous sulfate  325 mg Oral BID     PRN Meds: oxyCODONE-acetaminophen, melatonin, sodium chloride flush, acetaminophen **OR** acetaminophen, polyethylene glycol, promethazine **OR** ondansetron, glucose, dextrose, glucagon (rDNA), dextrose    Labs:     Recent Labs     09/03/20  0827 09/04/20  1119 09/04/20  1948 09/05/20  0410   WBC 9.2  --   --   --   --    HGB 8.4*   < > 8.1* 8.8* 8.0*   HCT 27.5*   < > 26.8* 29.2* 26.7*   *  --   --   --   --     < > = values in this interval not displayed. Recent Labs     09/03/20  0827 09/04/20  0322    138   K 4.1 4.2    105   CO2 23 21*   BUN 21* 14   CREATININE 1.0 0.8   CALCIUM 8.7 8.2*       Recent Labs     09/03/20  0827   PROT 5.6*   ALKPHOS 104   ALT 8   AST 10   BILITOT <0.2       Recent Labs     09/03/20  1108   INR 1.2       No results for input(s): Venancio Shown in the last 72 hours. Chronic labs:    Lab Results   Component Value Date    CHOL 148 06/01/2020    TRIG 252 (H) 06/01/2020    HDL 35 06/01/2020    LDLCALC 63 06/01/2020    TSH 1.300 08/01/2020    INR 1.2 09/03/2020    LABA1C 6.3 (H) 08/01/2020       Radiology: REVIEWED DAILY    +++++++++++++++++++++++++++++++++++++++++++++++++  Tova BECK [address] [place] [address]  +++++++++++++++++++++++++++++++++++++++++++++++++  NOTE: This report was transcribed using voice recognition software.  Every effort was made to ensure accuracy; however, inadvertent computerized transcription errors may be present.

## 2020-09-05 NOTE — PLAN OF CARE
Problem: Falls - Risk of:  Goal: Will remain free from falls  Description: Will remain free from falls  Outcome: Met This Shift  Goal: Absence of physical injury  Description: Absence of physical injury  Outcome: Met This Shift     Problem: Pain:  Goal: Pain level will decrease  Description: Pain level will decrease  Outcome: Met This Shift  Goal: Control of acute pain  Description: Control of acute pain  Outcome: Met This Shift  Goal: Control of chronic pain  Description: Control of chronic pain  Outcome: Met This Shift     Problem: Skin Integrity:  Goal: Will show no infection signs and symptoms  Description: Will show no infection signs and symptoms  Outcome: Met This Shift  Goal: Absence of new skin breakdown  Description: Absence of new skin breakdown  Outcome: Met This Shift

## 2020-09-06 LAB
HCT VFR BLD CALC: 25.9 % (ref 37–54)
HEMOGLOBIN: 8 G/DL (ref 12.5–16.5)
MCH RBC QN AUTO: 27.4 PG (ref 26–35)
MCHC RBC AUTO-ENTMCNC: 30.9 % (ref 32–34.5)
MCV RBC AUTO: 88.7 FL (ref 80–99.9)
METER GLUCOSE: 113 MG/DL (ref 74–99)
METER GLUCOSE: 117 MG/DL (ref 74–99)
METER GLUCOSE: 118 MG/DL (ref 74–99)
METER GLUCOSE: 135 MG/DL (ref 74–99)
PDW BLD-RTO: 17.6 FL (ref 11.5–15)
PLATELET # BLD: 1192 E9/L (ref 130–450)
PMV BLD AUTO: 8 FL (ref 7–12)
RBC # BLD: 2.92 E12/L (ref 3.8–5.8)
WBC # BLD: 7.8 E9/L (ref 4.5–11.5)

## 2020-09-06 PROCEDURE — 82962 GLUCOSE BLOOD TEST: CPT

## 2020-09-06 PROCEDURE — 6370000000 HC RX 637 (ALT 250 FOR IP): Performed by: INTERNAL MEDICINE

## 2020-09-06 PROCEDURE — 2580000003 HC RX 258: Performed by: STUDENT IN AN ORGANIZED HEALTH CARE EDUCATION/TRAINING PROGRAM

## 2020-09-06 PROCEDURE — 99232 SBSQ HOSP IP/OBS MODERATE 35: CPT | Performed by: SURGERY

## 2020-09-06 PROCEDURE — 36415 COLL VENOUS BLD VENIPUNCTURE: CPT

## 2020-09-06 PROCEDURE — 6360000002 HC RX W HCPCS: Performed by: STUDENT IN AN ORGANIZED HEALTH CARE EDUCATION/TRAINING PROGRAM

## 2020-09-06 PROCEDURE — 85027 COMPLETE CBC AUTOMATED: CPT

## 2020-09-06 PROCEDURE — C9113 INJ PANTOPRAZOLE SODIUM, VIA: HCPCS | Performed by: STUDENT IN AN ORGANIZED HEALTH CARE EDUCATION/TRAINING PROGRAM

## 2020-09-06 PROCEDURE — 2060000000 HC ICU INTERMEDIATE R&B

## 2020-09-06 PROCEDURE — 6370000000 HC RX 637 (ALT 250 FOR IP): Performed by: STUDENT IN AN ORGANIZED HEALTH CARE EDUCATION/TRAINING PROGRAM

## 2020-09-06 RX ORDER — INSULIN GLARGINE 100 [IU]/ML
3 INJECTION, SOLUTION SUBCUTANEOUS NIGHTLY
Status: DISCONTINUED | OUTPATIENT
Start: 2020-09-06 | End: 2020-09-11 | Stop reason: HOSPADM

## 2020-09-06 RX ADMIN — PANTOPRAZOLE SODIUM 40 MG: 40 INJECTION, POWDER, FOR SOLUTION INTRAVENOUS at 21:29

## 2020-09-06 RX ADMIN — PREGABALIN 300 MG: 150 CAPSULE ORAL at 21:28

## 2020-09-06 RX ADMIN — OXYCODONE AND ACETAMINOPHEN 1 TABLET: 5; 325 TABLET ORAL at 03:21

## 2020-09-06 RX ADMIN — OXYCODONE AND ACETAMINOPHEN 1 TABLET: 5; 325 TABLET ORAL at 15:28

## 2020-09-06 RX ADMIN — SODIUM CHLORIDE, PRESERVATIVE FREE 10 ML: 5 INJECTION INTRAVENOUS at 21:29

## 2020-09-06 RX ADMIN — OXYCODONE AND ACETAMINOPHEN 1 TABLET: 5; 325 TABLET ORAL at 21:29

## 2020-09-06 RX ADMIN — CYCLOBENZAPRINE 10 MG: 10 TABLET, FILM COATED ORAL at 08:40

## 2020-09-06 RX ADMIN — CYCLOBENZAPRINE 10 MG: 10 TABLET, FILM COATED ORAL at 15:28

## 2020-09-06 RX ADMIN — CYCLOBENZAPRINE 10 MG: 10 TABLET, FILM COATED ORAL at 21:28

## 2020-09-06 RX ADMIN — PANTOPRAZOLE SODIUM 40 MG: 40 INJECTION, POWDER, FOR SOLUTION INTRAVENOUS at 08:42

## 2020-09-06 RX ADMIN — FERROUS SULFATE TAB 325 MG (65 MG ELEMENTAL FE) 325 MG: 325 (65 FE) TAB at 21:29

## 2020-09-06 RX ADMIN — PRAVASTATIN SODIUM 20 MG: 20 TABLET ORAL at 21:29

## 2020-09-06 RX ADMIN — FENOFIBRATE 54 MG: 54 TABLET ORAL at 08:40

## 2020-09-06 RX ADMIN — HYDROXYUREA 500 MG: 500 CAPSULE ORAL at 21:28

## 2020-09-06 RX ADMIN — PREGABALIN 300 MG: 150 CAPSULE ORAL at 08:40

## 2020-09-06 RX ADMIN — HYDROXYUREA 500 MG: 500 CAPSULE ORAL at 08:40

## 2020-09-06 RX ADMIN — SODIUM CHLORIDE, PRESERVATIVE FREE 10 ML: 5 INJECTION INTRAVENOUS at 08:42

## 2020-09-06 RX ADMIN — OXYCODONE AND ACETAMINOPHEN 1 TABLET: 5; 325 TABLET ORAL at 09:23

## 2020-09-06 RX ADMIN — INSULIN GLARGINE 3 UNITS: 100 INJECTION, SOLUTION SUBCUTANEOUS at 21:29

## 2020-09-06 RX ADMIN — SODIUM CHLORIDE: 9 INJECTION, SOLUTION INTRAVENOUS at 03:21

## 2020-09-06 RX ADMIN — FERROUS SULFATE TAB 325 MG (65 MG ELEMENTAL FE) 325 MG: 325 (65 FE) TAB at 08:40

## 2020-09-06 ASSESSMENT — PAIN DESCRIPTION - PAIN TYPE
TYPE: ACUTE PAIN

## 2020-09-06 ASSESSMENT — PAIN - FUNCTIONAL ASSESSMENT: PAIN_FUNCTIONAL_ASSESSMENT: PREVENTS OR INTERFERES SOME ACTIVE ACTIVITIES AND ADLS

## 2020-09-06 ASSESSMENT — PAIN DESCRIPTION - DESCRIPTORS
DESCRIPTORS: ACHING;CONSTANT;DISCOMFORT

## 2020-09-06 ASSESSMENT — PAIN SCALES - GENERAL
PAINLEVEL_OUTOF10: 10
PAINLEVEL_OUTOF10: 10
PAINLEVEL_OUTOF10: 9
PAINLEVEL_OUTOF10: 10

## 2020-09-06 ASSESSMENT — PAIN DESCRIPTION - FREQUENCY: FREQUENCY: CONTINUOUS

## 2020-09-06 ASSESSMENT — PAIN DESCRIPTION - PROGRESSION: CLINICAL_PROGRESSION: GRADUALLY WORSENING

## 2020-09-06 ASSESSMENT — PAIN DESCRIPTION - LOCATION
LOCATION: GENERALIZED

## 2020-09-06 ASSESSMENT — PAIN DESCRIPTION - ONSET: ONSET: ON-GOING

## 2020-09-06 NOTE — PROGRESS NOTES
[facility] SURGICAL [facility] - WHITE TEAM  TRAUMA/GENERAL SURGERY  ATTENDING PROGRESS NOTE  _____________________________________________________    Patient:    Arabella Councilman II   Room:    5955/6844-M  Date of service:   9/6/2020   LOS:     3  _____________________________________________________      CC:   GI bleed    Subjective:  No complaints today  Underwent colonoscopy and bleeding scan yesterday-findings noted    Objective:  Vitals:    09/05/20 2000 09/05/20 2315 09/06/20 0556 09/06/20 0800   BP: (!) 101/58 118/76  134/86   Pulse: 112 120  113   Resp: 16 16  16   Temp: 97.3 °F (36.3 °C) 97.4 °F (36.3 °C)  97.7 °F (36.5 °C)   TempSrc: Temporal Temporal     SpO2: 97% 95%     Weight:   243 lb 13.3 oz (110.6 kg)    Height:            I/O last 3 completed shifts: In: 2629 [P.O.:1090; I.V.:2435]  Out: 56 [Urine:650; Drains:380]    General - no apparent distress   Neuro - Awake, alert, attentive   HEENT - NC/AT, oral mucosa moist  Lungs - non labored, on room air  Heart - NSR   Abdomen -right upper quadrant drain with bilious drainage, non distended, nontender    Assessment:    GI bleed due to cecal lesion-biopsies pending  Mild gastritis-biopsies pending    Plan:  Await biopsies  Monitor H&H  May have diet  Hold Eliquis  Reviewed with hospitalist    NOTE: This report was transcribed using voice recognition software. Every effort was made to ensure accuracy; however, inadvertent computerized transcription errors may be present.

## 2020-09-06 NOTE — PROGRESS NOTES
loss in the setting of underlying anemia of chronic disease:H/H serially monitored and no notable drop noted. Will continue to monitor the H/H on consistent bases. 4. Hypotension: Due to dehydration and also possible volume loss from draining cholecystostomy tube. BP improved to WNL with IVF blouses and maintenance IVF. Lisinopril discontinued. Monitor the vital signs closely. Will obtain Lactate. 5. Type II DM with long-term insulin use and with complication: On Lantus and sliding scale. Blood sugar running low and thus Lantus dose reduced. ADA diet. Insulin dose to be adjusted per the BS. 6. Diabetic neuropathy:on Lyrica. 7. Hyperlipidemia, mixed:on Statin and Fenofibrate. 8. Hx of PE: Apixaban was on hold because of suspected GI bleed and also b/c patient is scheduled to have surgical surgery for Cecal mass. 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 Apixaban to be resumed when OK with the surgical team.   9. Hx of Thrombocytosis:on Hydroxyurea. 10. Hx of  Splenic thrombosis: Apixaban to be resumed as soon the GI work up is completed and when OK with the surgical team.  11. Hx of splenic infarction: This was likely to be due to splenic thrombosis  12. Sacral decubitus wound, stage II/III,present on admission: Continue skin and wound care. 13. Hx of acute cholecystitis:Currently patient has cholecystomy tube placement  14. Obesity with BMI of 32.55: Patient sho benefit from lifestyle modification for weight loss  15. Osteoarthritis of hip joint is status post replacement: Continue symptomatic management  16. DVT prophylaxis:SCD. Apixaban on hold at present. DISPOSITION: Patient is expected to be hospitalized for few more days as he is scheduled for surgery.   Medications:  REVIEWED DAILY    Infusion Medications    dextrose      sodium chloride 125 mL/hr at 09/06/20 0546     Scheduled Medications    insulin glargine  5 Units Subcutaneous Nightly    pantoprazole  40 mg Intravenous BID    And    sodium chloride (PF)  10 mL Intravenous BID    pregabalin  300 mg Oral BID    pravastatin  20 mg Oral Nightly    miconazole   Topical BID    hydroxyurea  500 mg Oral BID    fluticasone  2 spray Nasal Daily    fenofibrate  54 mg Oral Daily    DULoxetine  30 mg Oral Daily    cyclobenzaprine  10 mg Oral TID    sodium chloride flush  10 mL Intravenous 2 times per day    influenza virus vaccine  0.5 mL Intramuscular Prior to discharge    insulin lispro  0-6 Units Subcutaneous TID WC    insulin lispro  0-3 Units Subcutaneous Nightly    ferrous sulfate  325 mg Oral BID     PRN Meds: oxyCODONE-acetaminophen, melatonin, sodium chloride flush, acetaminophen **OR** acetaminophen, polyethylene glycol, promethazine **OR** ondansetron, glucose, dextrose, glucagon (rDNA), dextrose    Labs:     Recent Labs     09/04/20  1948 09/05/20  0410 09/06/20  0552   WBC  --   --  7.8   HGB 8.8* 8.0* 8.0*   HCT 29.2* 26.7* 25.9*   PLT  --   --  1,192*       Recent Labs     09/04/20  0322      K 4.2      CO2 21*   BUN 14   CREATININE 0.8   CALCIUM 8.2*       No results for input(s): PROT, ALB, ALKPHOS, ALT, AST, BILITOT, AMYLASE, LIPASE in the last 72 hours. Recent Labs     09/03/20  1108   INR 1.2       No results for input(s): Delmy Sox in the last 72 hours. Chronic labs:    Lab Results   Component Value Date    CHOL 148 06/01/2020    TRIG 252 (H) 06/01/2020    HDL 35 06/01/2020    LDLCALC 63 06/01/2020    TSH 1.300 08/01/2020    INR 1.2 09/03/2020    LABA1C 6.3 (H) 08/01/2020       Radiology: REVIEWED DAILY    +++++++++++++++++++++++++++++++++++++++++++++++++  Tova BECK [address] [place], [place]  +++++++++++++++++++++++++++++++++++++++++++++++++  NOTE: This report was transcribed using voice recognition software.  Every effort was made to ensure accuracy; however, inadvertent computerized transcription errors may be present.

## 2020-09-06 NOTE — PROGRESS NOTES
C/o generalized pain, 10/10 scale, percocet 1 tab PO given as requested w/ HS meds. Will continue to monitor.  Electronically signed by Antony Macario RN on 9/5/2020 at 8:52 PM

## 2020-09-07 LAB
HCT VFR BLD CALC: 24.1 % (ref 37–54)
HEMOGLOBIN: 7.5 G/DL (ref 12.5–16.5)
MCH RBC QN AUTO: 27.6 PG (ref 26–35)
MCHC RBC AUTO-ENTMCNC: 31.1 % (ref 32–34.5)
MCV RBC AUTO: 88.6 FL (ref 80–99.9)
METER GLUCOSE: 101 MG/DL (ref 74–99)
METER GLUCOSE: 117 MG/DL (ref 74–99)
METER GLUCOSE: 131 MG/DL (ref 74–99)
PDW BLD-RTO: 17.8 FL (ref 11.5–15)
PLATELET # BLD: 1000 E9/L (ref 130–450)
PMV BLD AUTO: 8 FL (ref 7–12)
RBC # BLD: 2.72 E12/L (ref 3.8–5.8)
WBC # BLD: 7.7 E9/L (ref 4.5–11.5)

## 2020-09-07 PROCEDURE — 6370000000 HC RX 637 (ALT 250 FOR IP): Performed by: STUDENT IN AN ORGANIZED HEALTH CARE EDUCATION/TRAINING PROGRAM

## 2020-09-07 PROCEDURE — 97530 THERAPEUTIC ACTIVITIES: CPT

## 2020-09-07 PROCEDURE — 2580000003 HC RX 258: Performed by: STUDENT IN AN ORGANIZED HEALTH CARE EDUCATION/TRAINING PROGRAM

## 2020-09-07 PROCEDURE — 6360000002 HC RX W HCPCS: Performed by: STUDENT IN AN ORGANIZED HEALTH CARE EDUCATION/TRAINING PROGRAM

## 2020-09-07 PROCEDURE — 6370000000 HC RX 637 (ALT 250 FOR IP): Performed by: INTERNAL MEDICINE

## 2020-09-07 PROCEDURE — 99231 SBSQ HOSP IP/OBS SF/LOW 25: CPT | Performed by: SURGERY

## 2020-09-07 PROCEDURE — 97535 SELF CARE MNGMENT TRAINING: CPT

## 2020-09-07 PROCEDURE — 36415 COLL VENOUS BLD VENIPUNCTURE: CPT

## 2020-09-07 PROCEDURE — 82962 GLUCOSE BLOOD TEST: CPT

## 2020-09-07 PROCEDURE — 97110 THERAPEUTIC EXERCISES: CPT

## 2020-09-07 PROCEDURE — 2060000000 HC ICU INTERMEDIATE R&B

## 2020-09-07 PROCEDURE — 85027 COMPLETE CBC AUTOMATED: CPT

## 2020-09-07 PROCEDURE — C9113 INJ PANTOPRAZOLE SODIUM, VIA: HCPCS | Performed by: STUDENT IN AN ORGANIZED HEALTH CARE EDUCATION/TRAINING PROGRAM

## 2020-09-07 PROCEDURE — 97161 PT EVAL LOW COMPLEX 20 MIN: CPT

## 2020-09-07 RX ADMIN — OXYCODONE AND ACETAMINOPHEN 1 TABLET: 5; 325 TABLET ORAL at 03:27

## 2020-09-07 RX ADMIN — FENOFIBRATE 54 MG: 54 TABLET ORAL at 08:27

## 2020-09-07 RX ADMIN — FERROUS SULFATE TAB 325 MG (65 MG ELEMENTAL FE) 325 MG: 325 (65 FE) TAB at 08:27

## 2020-09-07 RX ADMIN — OXYCODONE AND ACETAMINOPHEN 1 TABLET: 5; 325 TABLET ORAL at 15:27

## 2020-09-07 RX ADMIN — SODIUM CHLORIDE, PRESERVATIVE FREE 10 ML: 5 INJECTION INTRAVENOUS at 19:53

## 2020-09-07 RX ADMIN — CYCLOBENZAPRINE 10 MG: 10 TABLET, FILM COATED ORAL at 08:26

## 2020-09-07 RX ADMIN — PRAVASTATIN SODIUM 20 MG: 20 TABLET ORAL at 19:48

## 2020-09-07 RX ADMIN — PREGABALIN 300 MG: 150 CAPSULE ORAL at 08:27

## 2020-09-07 RX ADMIN — OXYCODONE AND ACETAMINOPHEN 1 TABLET: 5; 325 TABLET ORAL at 21:27

## 2020-09-07 RX ADMIN — PANTOPRAZOLE SODIUM 40 MG: 40 INJECTION, POWDER, FOR SOLUTION INTRAVENOUS at 19:48

## 2020-09-07 RX ADMIN — CYCLOBENZAPRINE 10 MG: 10 TABLET, FILM COATED ORAL at 19:48

## 2020-09-07 RX ADMIN — INSULIN GLARGINE 3 UNITS: 100 INJECTION, SOLUTION SUBCUTANEOUS at 19:49

## 2020-09-07 RX ADMIN — PREGABALIN 300 MG: 150 CAPSULE ORAL at 19:48

## 2020-09-07 RX ADMIN — DULOXETINE HYDROCHLORIDE 30 MG: 30 CAPSULE, DELAYED RELEASE ORAL at 08:26

## 2020-09-07 RX ADMIN — HYDROXYUREA 500 MG: 500 CAPSULE ORAL at 08:26

## 2020-09-07 RX ADMIN — CYCLOBENZAPRINE 10 MG: 10 TABLET, FILM COATED ORAL at 14:16

## 2020-09-07 RX ADMIN — SODIUM CHLORIDE, PRESERVATIVE FREE 10 ML: 5 INJECTION INTRAVENOUS at 08:27

## 2020-09-07 RX ADMIN — FLUTICASONE PROPIONATE 2 SPRAY: 50 SPRAY, METERED NASAL at 08:29

## 2020-09-07 RX ADMIN — OXYCODONE AND ACETAMINOPHEN 1 TABLET: 5; 325 TABLET ORAL at 09:30

## 2020-09-07 RX ADMIN — HYDROXYUREA 500 MG: 500 CAPSULE ORAL at 19:48

## 2020-09-07 RX ADMIN — MICONAZOLE NITRATE: 20.6 POWDER TOPICAL at 19:47

## 2020-09-07 RX ADMIN — FERROUS SULFATE TAB 325 MG (65 MG ELEMENTAL FE) 325 MG: 325 (65 FE) TAB at 19:47

## 2020-09-07 RX ADMIN — PANTOPRAZOLE SODIUM 40 MG: 40 INJECTION, POWDER, FOR SOLUTION INTRAVENOUS at 08:27

## 2020-09-07 ASSESSMENT — PAIN DESCRIPTION - PROGRESSION: CLINICAL_PROGRESSION: GRADUALLY WORSENING

## 2020-09-07 ASSESSMENT — PAIN SCALES - GENERAL
PAINLEVEL_OUTOF10: 10
PAINLEVEL_OUTOF10: 10
PAINLEVEL_OUTOF10: 9
PAINLEVEL_OUTOF10: 0
PAINLEVEL_OUTOF10: 9

## 2020-09-07 ASSESSMENT — PAIN DESCRIPTION - DESCRIPTORS
DESCRIPTORS: ACHING;CONSTANT;DISCOMFORT
DESCRIPTORS: ACHING;CONSTANT;DISCOMFORT

## 2020-09-07 ASSESSMENT — PAIN DESCRIPTION - PAIN TYPE
TYPE: ACUTE PAIN
TYPE: ACUTE PAIN

## 2020-09-07 ASSESSMENT — PAIN DESCRIPTION - LOCATION
LOCATION: GENERALIZED
LOCATION: GENERALIZED

## 2020-09-07 NOTE — PROGRESS NOTES
Mod I   Grooming Supervision   supervision  Standing at sink  Good attempts to use RUE during grooming tasks Mod I   UB Dressing SBA/min A  Patient donned shirt with SBA with min A to don  R UE sling for proper positioning  SBA/min A  To don hospital gown and  R UE sling using adapted tech Mod I   LB Dressing Supervision  Patient donned B shoes while seated with SBA overall Supervision  Patient donned B shoes while seated with SBA overall  Mod I   Bathing Supervision  Supervision   Standing next to sink Mod I   Toileting Supervision  supervision  Functional transfers to commode with use of grab bars Mod I   Bed Mobility  Supine to sit: NT  Sit to supine: NT   Supine to sit: I  Sit to supine: I   Functional Transfers Sit to stand: Supervision  Stand to sit: Supervision     Sit to stand: Supervision  Stand to sit: Supervision Mod I   Functional Mobility CGA/Supervision  Patient completed functional mobility bed<>hallway for household distance simulation with CGA/supervision overall using straight cane with min verbal cues for safety Supervision  With support of IV pole   Bed to bathroom  Cues for safety and support of RUE  Mod I functional household distance    Balance Sitting:     Static: SBA    Dynamic:SBA  Standing: Supervision       Activity Tolerance Fair   Good   Visual/  Perceptual Glasses: no           Safety Fair   Good   B UE UE ROM:    RUE: Per patient, patient is R UE NWB with sling - patient unable to provide additional information regarding injury or precautions. Patient reported injury occurred in [facility] earlier this year and patient never had follow-up- patient reports R UE flaccid but B shoulder and elbow volitional movement observed.  Patient demonstrates wrist drop  L UE: WFL     Strength:            RUE: NT due to patient report of NWB R UE            LUE:  4/5      Strength: L : WFL          Coordination Fine Motor Coordination: L UE: WFL      Gross Motor Coordination:  L UE: WFL

## 2020-09-07 NOTE — PROGRESS NOTES
[facility] SURGICAL \[facility]\"" - WHITE TEAM  TRAUMA/GENERAL SURGERY  ATTENDING PROGRESS NOTE  _____________________________________________________    Patient:    Robert Boland II   Room:    7553/4074-P  Date of service:   9/7/2020   LOS:     4  _____________________________________________________      CC:   GI bleed    Subjective:  No acute changes    Objective:  Vitals:    09/06/20 0800 09/06/20 1530 09/06/20 1945 09/07/20 0000   BP: 134/86 (!) 140/88 114/75 108/70   Pulse: 113 111 113 108   Resp: 16 16 18 16   Temp: 97.7 °F (36.5 °C) 98 °F (36.7 °C) 98.1 °F (36.7 °C) 98.3 °F (36.8 °C)   TempSrc: Temporal Temporal Temporal    SpO2: 95%  94% 99%   Weight:       Height:            I/O last 3 completed shifts: In: 0563 [P.O.:730; I.V.:1032]  Out: 36 [Urine:400; Drains:520]    General - no apparent distress   Abdomen -right upper quadrant drain with bilious drainage, non distended, nontender    Hemoglobin 7.5    Assessment:    GI bleed due to cecal lesion-biopsies pending  Mild gastritis-biopsies pending  Splenic infarction with abscess from prior admission at [facility]  Cholecystitis with cholecystostomy tube from prior admission    Plan:  Await biopsies from colon  Monitor H&H  May have diet  Hold Eliquis  Continue cholecystostomy tube for now    NOTE: This report was transcribed using voice recognition software. Every effort was made to ensure accuracy; however, inadvertent computerized transcription errors may be present.

## 2020-09-07 NOTE — PROGRESS NOTES
Message sent to Dr. Victorina Dickens through [facility] regarding patient request for percocet q4h instead of q6h

## 2020-09-07 NOTE — PROGRESS NOTES
setting/discharge process. Patient response to education:   Pt verbalized understanding Pt demonstrated skill Pt requires further education in this area   Yes NA No     ASSESSMENT:    Comments:   Pt was seated at EOB upon room entry, agreeable to PT evaluation. Pt ambulated with IV pole group home around unit. Gait is slow but steady. Pt's feet occasionally bumped IV pole when ambulating but pt did not have LOB. Pt ambulated back to room and was seated in chair. Pt is eager to be discharged back to rehab. All questions and concerns were addressed. Pt was left seated at EOB with all needs met at conclusion of session. Treatment:  Patient practiced and was instructed in the following treatment:     Therapeutic activities: Pt completed all therapeutic activities noted above. Pt was cued for safety when ambulating with IV pole. Pt was educated on proper positioning of R UE in sling. Pt's/family goals:   1. To return to [facility]. Patient and or family understand(s) diagnosis, prognosis, and plan of care. Yes. PLAN:    PT care will be provided in accordance with the objectives noted above. Exercises and functional mobility practice will be used as well as appropriate assistive devices or modalities to obtain goals. Patient and family education will also be administered as needed. Frequency of treatments: 2-5x/week x 1-2 days. Time in: 0855  Time out: 0915    Total Treatment Time 8 minutes     Evaluation Time includes thorough review of current medical information, gathering information on past medical history/social history and prior level of function, completion of standardized testing/informal observation of tasks, assessment of data and education on plan of care and goals.     CPT codes:  [x] Low Complexity PT evaluation 79914  [] Moderate Complexity PT evaluation 94170  [] High Complexity PT evaluation 51591  [] PT Re-evaluation 85293  [] Gait training 89099 0 minutes  [] Manual therapy 83652 0 minutes  [x] Therapeutic activities 87398 8 minutes  [] Therapeutic exercises 22412 0 minutes  [] Neuromuscular reeducation 55237 0 minutes     Jason Lindquist PT, DPT  [id number]

## 2020-09-07 NOTE — PROGRESS NOTES
polyethylene glycol, promethazine **OR** ondansetron, glucose, dextrose, glucagon (rDNA), dextrose      Intake/Output Summary (Last 24 hours) at 9/7/2020 1154  Last data filed at 9/7/2020 1051  Gross per 24 hour   Intake 660 ml   Output 425 ml   Net 235 ml       Exam:    BP (!) 124/58   Pulse 107   Temp 97.2 °F (36.2 °C) (Temporal)   Resp 16   Ht 6' (1.829 m)   Wt 243 lb 13.3 oz (110.6 kg)   SpO2 98%   BMI 33.07 kg/m²     General appearance: Sitting comfortably in bed. No apparent distress. Respiratory: Clear to auscultation bilaterally. Cardiovascular: Normal S1/S2. Regular rhythm, tachycardic. Abdomen: Soft, non-tender, non-distended with normal bowel sounds. Cholecystectomy tube in place  Musculoskeletal: No clubbing, cyanosis or edema bilaterally. Skin: Sacral decubitus ulcer   Neurologic:  No focal deficit. Psychiatric: Alert and oriented, thought content appropriate, normal insight  Peripheral Pulses: +2 palpable, equal bilaterally       Labs:   Recent Labs     09/05/20  0410 09/06/20  0552 09/07/20  0633   WBC  --  7.8 7.7   HGB 8.0* 8.0* 7.5*   HCT 26.7* 25.9* 24.1*   PLT  --  1,192* 1,000*     No results for input(s): NA, K, CL, CO2, BUN, CREATININE, CALCIUM, PHOS in the last 72 hours. Invalid input(s): MAGNES  No results for input(s): AST, ALT, BILIDIR, BILITOT, ALKPHOS in the last 72 hours. No results for input(s): INR in the last 72 hours. No results for input(s): Tiney Chapman in the last 72 hours. Radiology:  NM GI BLOOD LOSS   Final Result   No abnormal tracer identified to suggest GI bleed.                                         Active Hospital Problems    Diagnosis Date Noted    Acute GI bleeding [K92.2] 09/03/2020    Acute blood loss anemia [D62]     Thrombocytosis (HCC) [D47.3] 08/10/2020    Type 2 diabetes mellitus ([facility] [address] [E11.9] 04/08/2016    Class 1 obesity in adult [E66.9] 12/17/2015    Essential hypertension [I10] 10/16/2015    Neuropathic pain [M79.2] 05/08/2012       Assessment  1. GI bleeding - status post EGD on 9/4 which only showed minimal gastritis. Colonscopy on 09/05 showed Cecal mass and melena at Cecum and throughout the colon. Bleeding scan showed no abnormal tracer identified to suggest GI bleed  2. Acute blood loss anemia - likely secondary to above superimposed on baseline anemia of chronic disease  3. Hypotension - suspect due to secondary to hypovolemia, draining cholecystostomy tube likely contributory. Proved BP with IV hydration, holding lisinopril  4. Type II DM with long-term insulin use and with complication - rate controlled  5. Diabetic neuropathy  6. Hyperlipidemia, mixed  7. Chronic anticoagulation - on apixaban for history of pulmonary embolism, #9; currently on hold for #1.   8. Hx of Thrombocytosis - on Hydroxyurea. 9. Hx of  Splenic thrombosis   10. Hx of splenic infarction - This was likely to be due to splenic thrombosis  11. Sacral decubitus wound, stage II/III (POA)   12. Recent acute cholecystitis - currently patient has cholecystomy tube in place  13. Obesity - Body mass index is 33.07 kg/m².    14. Osteoarthritis of hip joint - status post replacement        Plan:  Continue to monitor H&H, transfuse PRBC to keep hemoglobin above 7 g/dL  On PPI  Will follow with general surgeon for further recommendation  Holding ASA, apixaban; plan to resume once okay with surgeon  Continue holding lisinopril for now  PRN analgesia  Decrease IV hydration to 75 cc/h  Basal and corrective bolus insulin regimen    DVT Prophylaxis: SCDs, no VTE chemoprophylaxis in light of suspected GI bleed  Diet: DIET GENERAL;  Code Status: Full Code    PT/OT Eval Status: Ongoing    Dispo - home once stable    Maxwell Naik MD 9/7/2020 11:54 AM

## 2020-09-08 LAB
HCT VFR BLD CALC: 24.6 % (ref 37–54)
HEMOGLOBIN: 7.6 G/DL (ref 12.5–16.5)
MCH RBC QN AUTO: 27.6 PG (ref 26–35)
MCHC RBC AUTO-ENTMCNC: 30.9 % (ref 32–34.5)
MCV RBC AUTO: 89.5 FL (ref 80–99.9)
METER GLUCOSE: 100 MG/DL (ref 74–99)
METER GLUCOSE: 108 MG/DL (ref 74–99)
METER GLUCOSE: 108 MG/DL (ref 74–99)
METER GLUCOSE: 114 MG/DL (ref 74–99)
METER GLUCOSE: 91 MG/DL (ref 74–99)
PDW BLD-RTO: 17.8 FL (ref 11.5–15)
PLATELET # BLD: 1218 E9/L (ref 130–450)
PMV BLD AUTO: 8 FL (ref 7–12)
RBC # BLD: 2.75 E12/L (ref 3.8–5.8)
WBC # BLD: 9.2 E9/L (ref 4.5–11.5)

## 2020-09-08 PROCEDURE — 99232 SBSQ HOSP IP/OBS MODERATE 35: CPT | Performed by: SURGERY

## 2020-09-08 PROCEDURE — 6370000000 HC RX 637 (ALT 250 FOR IP): Performed by: HOSPITALIST

## 2020-09-08 PROCEDURE — 6370000000 HC RX 637 (ALT 250 FOR IP): Performed by: STUDENT IN AN ORGANIZED HEALTH CARE EDUCATION/TRAINING PROGRAM

## 2020-09-08 PROCEDURE — 85027 COMPLETE CBC AUTOMATED: CPT

## 2020-09-08 PROCEDURE — 2580000003 HC RX 258: Performed by: HOSPITALIST

## 2020-09-08 PROCEDURE — 6360000002 HC RX W HCPCS: Performed by: STUDENT IN AN ORGANIZED HEALTH CARE EDUCATION/TRAINING PROGRAM

## 2020-09-08 PROCEDURE — 2060000000 HC ICU INTERMEDIATE R&B

## 2020-09-08 PROCEDURE — C9113 INJ PANTOPRAZOLE SODIUM, VIA: HCPCS | Performed by: STUDENT IN AN ORGANIZED HEALTH CARE EDUCATION/TRAINING PROGRAM

## 2020-09-08 PROCEDURE — 82962 GLUCOSE BLOOD TEST: CPT

## 2020-09-08 PROCEDURE — 2580000003 HC RX 258: Performed by: STUDENT IN AN ORGANIZED HEALTH CARE EDUCATION/TRAINING PROGRAM

## 2020-09-08 PROCEDURE — 36415 COLL VENOUS BLD VENIPUNCTURE: CPT

## 2020-09-08 PROCEDURE — 6370000000 HC RX 637 (ALT 250 FOR IP): Performed by: INTERNAL MEDICINE

## 2020-09-08 RX ORDER — OXYCODONE HYDROCHLORIDE AND ACETAMINOPHEN 5; 325 MG/1; MG/1
1 TABLET ORAL EVERY 4 HOURS PRN
Status: DISCONTINUED | OUTPATIENT
Start: 2020-09-08 | End: 2020-09-11 | Stop reason: HOSPADM

## 2020-09-08 RX ORDER — MORPHINE SULFATE 2 MG/ML
2 INJECTION, SOLUTION INTRAMUSCULAR; INTRAVENOUS EVERY 4 HOURS PRN
Status: DISCONTINUED | OUTPATIENT
Start: 2020-09-08 | End: 2020-09-11 | Stop reason: HOSPADM

## 2020-09-08 RX ORDER — OXYCODONE HYDROCHLORIDE AND ACETAMINOPHEN 5; 325 MG/1; MG/1
2 TABLET ORAL EVERY 4 HOURS PRN
Status: DISCONTINUED | OUTPATIENT
Start: 2020-09-08 | End: 2020-09-11 | Stop reason: HOSPADM

## 2020-09-08 RX ORDER — SIMETHICONE 80 MG
80 TABLET,CHEWABLE ORAL EVERY 6 HOURS PRN
Status: DISCONTINUED | OUTPATIENT
Start: 2020-09-08 | End: 2020-09-11 | Stop reason: HOSPADM

## 2020-09-08 RX ADMIN — SODIUM CHLORIDE, PRESERVATIVE FREE 10 ML: 5 INJECTION INTRAVENOUS at 09:26

## 2020-09-08 RX ADMIN — PANTOPRAZOLE SODIUM 40 MG: 40 INJECTION, POWDER, FOR SOLUTION INTRAVENOUS at 09:26

## 2020-09-08 RX ADMIN — FERROUS SULFATE TAB 325 MG (65 MG ELEMENTAL FE) 325 MG: 325 (65 FE) TAB at 21:17

## 2020-09-08 RX ADMIN — PANTOPRAZOLE SODIUM 40 MG: 40 INJECTION, POWDER, FOR SOLUTION INTRAVENOUS at 21:15

## 2020-09-08 RX ADMIN — OXYCODONE AND ACETAMINOPHEN 1 TABLET: 5; 325 TABLET ORAL at 09:25

## 2020-09-08 RX ADMIN — FENOFIBRATE 54 MG: 54 TABLET ORAL at 09:26

## 2020-09-08 RX ADMIN — CYCLOBENZAPRINE 10 MG: 10 TABLET, FILM COATED ORAL at 14:13

## 2020-09-08 RX ADMIN — PRAVASTATIN SODIUM 20 MG: 20 TABLET ORAL at 21:15

## 2020-09-08 RX ADMIN — CYCLOBENZAPRINE 10 MG: 10 TABLET, FILM COATED ORAL at 21:16

## 2020-09-08 RX ADMIN — HYDROXYUREA 500 MG: 500 CAPSULE ORAL at 21:16

## 2020-09-08 RX ADMIN — FLUTICASONE PROPIONATE 2 SPRAY: 50 SPRAY, METERED NASAL at 09:24

## 2020-09-08 RX ADMIN — CYCLOBENZAPRINE 10 MG: 10 TABLET, FILM COATED ORAL at 09:23

## 2020-09-08 RX ADMIN — OXYCODONE AND ACETAMINOPHEN 2 TABLET: 5; 325 TABLET ORAL at 14:14

## 2020-09-08 RX ADMIN — OXYCODONE AND ACETAMINOPHEN 1 TABLET: 5; 325 TABLET ORAL at 03:34

## 2020-09-08 RX ADMIN — OXYCODONE AND ACETAMINOPHEN 2 TABLET: 5; 325 TABLET ORAL at 18:18

## 2020-09-08 RX ADMIN — HYDROXYUREA 500 MG: 500 CAPSULE ORAL at 09:24

## 2020-09-08 RX ADMIN — PREGABALIN 300 MG: 150 CAPSULE ORAL at 21:16

## 2020-09-08 RX ADMIN — SODIUM CHLORIDE: 9 INJECTION, SOLUTION INTRAVENOUS at 22:54

## 2020-09-08 RX ADMIN — DULOXETINE HYDROCHLORIDE 30 MG: 30 CAPSULE, DELAYED RELEASE ORAL at 09:24

## 2020-09-08 RX ADMIN — OXYCODONE AND ACETAMINOPHEN 2 TABLET: 5; 325 TABLET ORAL at 22:55

## 2020-09-08 RX ADMIN — INSULIN GLARGINE 3 UNITS: 100 INJECTION, SOLUTION SUBCUTANEOUS at 21:18

## 2020-09-08 RX ADMIN — FERROUS SULFATE TAB 325 MG (65 MG ELEMENTAL FE) 325 MG: 325 (65 FE) TAB at 09:24

## 2020-09-08 RX ADMIN — PREGABALIN 300 MG: 150 CAPSULE ORAL at 09:24

## 2020-09-08 ASSESSMENT — PAIN DESCRIPTION - LOCATION
LOCATION: GENERALIZED
LOCATION: GENERALIZED

## 2020-09-08 ASSESSMENT — PAIN SCALES - GENERAL
PAINLEVEL_OUTOF10: 10
PAINLEVEL_OUTOF10: 7
PAINLEVEL_OUTOF10: 9
PAINLEVEL_OUTOF10: 9
PAINLEVEL_OUTOF10: 10
PAINLEVEL_OUTOF10: 10
PAINLEVEL_OUTOF10: 7
PAINLEVEL_OUTOF10: 8
PAINLEVEL_OUTOF10: 4

## 2020-09-08 ASSESSMENT — PAIN DESCRIPTION - PAIN TYPE: TYPE: ACUTE PAIN

## 2020-09-08 NOTE — PROGRESS NOTES
[facility]   ATTENDING PHYSICIAN PROGRESS NOTE     I have examined the patient, reviewed the record, and discussed the case with the APN/ Resident. I have reviewed all relevant labs and imaging data. The following summarizes my clinical findings and independent assessment. CC: GI bleed    Patient reports ongoing lower abdominal discomfort. Denies any further blood in his stools. Passing flatus. Tolerating a diet.     Awake and alert  Follows commands  Heart: Regular  Lungs: Fairly clear bilaterally  Abdomen: Soft; bowel sounds active; minimal tenderness to palpation; no rebound; no guarding; JULIO drain with bilious drainage  Skin: Warm/dry; dressing to sacral decub    Patient Active Problem List    Diagnosis Date Noted    Acute GI bleeding 09/03/2020    Acute blood loss anemia     Thrombocytosis ([address] 08/10/2020    Decubitus ulcer of sacral area 08/01/2020    Abnormal findings on diagnostic imaging of gall bladder 08/01/2020    Splenic infarction 08/01/2020    Cyst of spleen 08/01/2020    Splenic abscess 08/01/2020    Anemia 08/01/2020    Pulmonary embolism ([address] 08/01/2020    Enterocolitis 07/31/2020    Rectus diastasis 11/01/2019    Recurrent unilateral inguinal hernia 11/01/2019    Cellulitis of sacral region 07/02/2019    Cellulitis of foot 06/30/2019    Neuropathy 06/30/2019    Cellulitis, wound, post-operative 06/30/2019    Left leg swelling 06/30/2019    SIRS (systemic inflammatory response syndrome) ([address] 06/30/2019    Primary osteoarthritis of right hip 11/03/2016    Primary osteoarthritis of left hip 04/11/2016    Type 2 diabetes mellitus ([address] 04/08/2016    Lumbar disc herniation 02/22/2016    Lumbar radiculopathy 12/17/2015    Osteoarthritis of spine with radiculopathy, lumbar region 12/17/2015    Class 1 obesity in adult 12/17/2015    Allergic rhinitis 11/23/2015    Essential hypertension 10/16/2015    Vitamin D deficiency 04/14/2015    Fibromyalgia 12/15/2014    Insomnia 07/04/2014    Osteoarthritis 03/27/2014    Lumbar spondylosis 01/07/2014    Neuropathic pain 05/08/2012       GI bleed--unspecified source  Await biopsies from EGD and colonoscopy  Diet as tolerated  Monitor H/H  Continue cholecystostomy tube  OOB/ambulate  PCDs    Getachew Wilkins MD, FACS  9/8/2020  10:25 AM      NOTE: This report was transcribed using voice recognition software. Every effort was made to ensure accuracy; however, inadvertent computerized transcription errors may be present.

## 2020-09-08 NOTE — PROGRESS NOTES
acetaminophen **OR** acetaminophen, polyethylene glycol, promethazine **OR** ondansetron, glucose, dextrose, glucagon (rDNA), dextrose      Intake/Output Summary (Last 24 hours) at 9/8/2020 0945  Last data filed at 9/8/2020 0359  Gross per 24 hour   Intake 2512 ml   Output 350 ml   Net 2162 ml       Exam:    /86   Pulse 110   Temp 96.3 °F (35.7 °C) (Temporal)   Resp 17   Ht 6' (1.829 m)   Wt 243 lb 13.3 oz (110.6 kg)   SpO2 100%   BMI 33.07 kg/m²     General appearance: Sitting comfortably in bed. No apparent distress. Respiratory: Clear to auscultation bilaterally. Cardiovascular: Normal S1/S2. Regular rhythm, tachycardic. Abdomen: Soft, non-tender, non-distended with normal bowel sounds. Cholecystectomy tube in place  Musculoskeletal: No clubbing, cyanosis or edema bilaterally. Skin: Sacral decubitus ulcer   Neurologic:  No focal deficit. Psychiatric: Alert and oriented, thought content appropriate, normal insight  Peripheral Pulses: +2 palpable, equal bilaterally       Labs:   Recent Labs     09/06/20  0552 09/07/20  0633 09/08/20  0415   WBC 7.8 7.7 9.2   HGB 8.0* 7.5* 7.6*   HCT 25.9* 24.1* 24.6*   PLT 1,192* 1,000* 1,218*     No results for input(s): NA, K, CL, CO2, BUN, CREATININE, CALCIUM, PHOS in the last 72 hours. Invalid input(s): MAGNES  No results for input(s): AST, ALT, BILIDIR, BILITOT, ALKPHOS in the last 72 hours. No results for input(s): INR in the last 72 hours. No results for input(s): Serene Winnett in the last 72 hours. Radiology:  NM GI BLOOD LOSS   Final Result   No abnormal tracer identified to suggest GI bleed.                                         Active Hospital Problems    Diagnosis Date Noted    Acute GI bleeding [K92.2] 09/03/2020    Acute blood loss anemia [D62]     Thrombocytosis (HCC) [D47.3] 08/10/2020    Type 2 diabetes mellitus ([facility] [E11.9] 04/08/2016    Class 1 obesity in adult [E66.9] 12/17/2015    Essential hypertension [I10] 10/16/2015  Neuropathic pain [M79.2] 05/08/2012       Assessment  1. GI bleeding - status post EGD on 9/4 which only showed minimal gastritis. Colonscopy on 09/05 showed Cecal mass and melena at Cecum and throughout the colon. Bleeding scan showed no abnormal tracer identified to suggest GI bleed  2. Acute blood loss anemia - likely secondary to above superimposed on baseline anemia of chronic disease  3. Hypotension - suspect due to secondary to hypovolemia, draining cholecystostomy tube likely contributory. Improved BP with IV hydration, holding lisinopril  4. Type II DM with long-term insulin use and with complication - rate controlled  5. Diabetic neuropathy  6. Hyperlipidemia, mixed  7. Chronic anticoagulation - on apixaban for history of pulmonary embolism, #9; currently on hold for #1.   8. Thrombocytosis - on Hydroxyurea. 9. Hx of  Splenic thrombosis   10. Hx of splenic infarction - This was likely to be due to splenic thrombosis  11. Sacral decubitus wound, stage II/III (POA)   12. Recent acute cholecystitis - currently patient has cholecystomy tube in place  13. Obesity - Body mass index is 33.07 kg/m².    14. Osteoarthritis of hip joint - status post replacement        Plan:  Continue to monitor H&H, transfuse PRBC to keep hemoglobin above 7 g/dL  On PPI  Awaiting pathology report from cecal mass biopsy  Holding ASA, apixaban; plan to resume once okay with surgeon  Continue holding lisinopril for now  PRN analgesia  Gentle IV 75 cc/h  Basal and corrective bolus insulin regimen    DVT Prophylaxis: SCDs, no VTE chemoprophylaxis in light of suspected GI bleed  Diet: DIET GENERAL;  Code Status: Full Code    PT/OT Eval Status: Ongoing    Dispo - home once stable    Sandra Bañuelos MD 9/8/2020 9:45 AM

## 2020-09-08 NOTE — CARE COORDINATION
No return call or return email received from [address] [place] [address] with [address]. Call again placed to [address] to attempt to speak with liaison in admitting. Facility has answering machine for messages in general, unable to speak with a person. Email again sent to Jocelyn re: patient's ability to transition back to their facility. Return email received. Pre-cert has been initiated and patient can return to their facility tomorrow. Will continue to follow.      Glendy Conti.  P:  [phone]

## 2020-09-08 NOTE — CARE COORDINATION
Chart reviewed. Patient with stage 4 sacral wound d/[facility] of [facility]. Wound care consult pending for sacral wound vac as at [facility]. Colonoscopy performed 8/5 revealed a mass. Surgery is waiting on pathology results. Hgb stable at 7.6 today. Patient with adriano tube in place with 175 cc out this am.  IV fluids continue. Therapy notes reviewed. Patient ambulating 200 ft with AMPAC of 18. Attempted to call Olivia to speak with Jocelyn re: transition of care plan and appropriateness to return at discharge. Unable to reach her at the facility and transferred to the wrong  so unable to leave message. Email sent to [facility] requesting she review case for appropriateness for transition back to [address] at discharge. Number for return call left in the email x2. COVID test negative 9/4/2020. Await response. Envelope and transfer paperwork in soft chart.       Glendy Conti.  P:  [phone]

## 2020-09-09 LAB
HCT VFR BLD CALC: 28.2 % (ref 37–54)
HEMOGLOBIN: 8.8 G/DL (ref 12.5–16.5)
MCH RBC QN AUTO: 27.9 PG (ref 26–35)
MCHC RBC AUTO-ENTMCNC: 31.2 % (ref 32–34.5)
MCV RBC AUTO: 89.5 FL (ref 80–99.9)
METER GLUCOSE: 104 MG/DL (ref 74–99)
METER GLUCOSE: 112 MG/DL (ref 74–99)
METER GLUCOSE: 127 MG/DL (ref 74–99)
METER GLUCOSE: 131 MG/DL (ref 74–99)
METER GLUCOSE: 95 MG/DL (ref 74–99)
PDW BLD-RTO: 18.6 FL (ref 11.5–15)
PLATELET # BLD: 1283 E9/L (ref 130–450)
PMV BLD AUTO: 8.2 FL (ref 7–12)
RBC # BLD: 3.15 E12/L (ref 3.8–5.8)
WBC # BLD: 14.8 E9/L (ref 4.5–11.5)

## 2020-09-09 PROCEDURE — 99232 SBSQ HOSP IP/OBS MODERATE 35: CPT | Performed by: SURGERY

## 2020-09-09 PROCEDURE — C9113 INJ PANTOPRAZOLE SODIUM, VIA: HCPCS | Performed by: STUDENT IN AN ORGANIZED HEALTH CARE EDUCATION/TRAINING PROGRAM

## 2020-09-09 PROCEDURE — 6370000000 HC RX 637 (ALT 250 FOR IP): Performed by: INTERNAL MEDICINE

## 2020-09-09 PROCEDURE — 6370000000 HC RX 637 (ALT 250 FOR IP): Performed by: STUDENT IN AN ORGANIZED HEALTH CARE EDUCATION/TRAINING PROGRAM

## 2020-09-09 PROCEDURE — 2060000000 HC ICU INTERMEDIATE R&B

## 2020-09-09 PROCEDURE — 2580000003 HC RX 258: Performed by: HOSPITALIST

## 2020-09-09 PROCEDURE — 82962 GLUCOSE BLOOD TEST: CPT

## 2020-09-09 PROCEDURE — 99254 IP/OBS CNSLTJ NEW/EST MOD 60: CPT | Performed by: INTERNAL MEDICINE

## 2020-09-09 PROCEDURE — 85027 COMPLETE CBC AUTOMATED: CPT

## 2020-09-09 PROCEDURE — 36415 COLL VENOUS BLD VENIPUNCTURE: CPT

## 2020-09-09 PROCEDURE — 2580000003 HC RX 258: Performed by: STUDENT IN AN ORGANIZED HEALTH CARE EDUCATION/TRAINING PROGRAM

## 2020-09-09 PROCEDURE — APPSS60 APP SPLIT SHARED TIME 46-60 MINUTES: Performed by: PHYSICIAN ASSISTANT

## 2020-09-09 PROCEDURE — 6370000000 HC RX 637 (ALT 250 FOR IP): Performed by: HOSPITALIST

## 2020-09-09 PROCEDURE — 6360000002 HC RX W HCPCS: Performed by: STUDENT IN AN ORGANIZED HEALTH CARE EDUCATION/TRAINING PROGRAM

## 2020-09-09 RX ADMIN — OXYCODONE AND ACETAMINOPHEN 2 TABLET: 5; 325 TABLET ORAL at 03:33

## 2020-09-09 RX ADMIN — SODIUM CHLORIDE, PRESERVATIVE FREE 10 ML: 5 INJECTION INTRAVENOUS at 09:02

## 2020-09-09 RX ADMIN — PANTOPRAZOLE SODIUM 40 MG: 40 INJECTION, POWDER, FOR SOLUTION INTRAVENOUS at 22:36

## 2020-09-09 RX ADMIN — FERROUS SULFATE TAB 325 MG (65 MG ELEMENTAL FE) 325 MG: 325 (65 FE) TAB at 21:13

## 2020-09-09 RX ADMIN — HYDROXYUREA 500 MG: 500 CAPSULE ORAL at 21:13

## 2020-09-09 RX ADMIN — SODIUM CHLORIDE, PRESERVATIVE FREE 10 ML: 5 INJECTION INTRAVENOUS at 22:36

## 2020-09-09 RX ADMIN — PREGABALIN 300 MG: 150 CAPSULE ORAL at 21:13

## 2020-09-09 RX ADMIN — PRAVASTATIN SODIUM 20 MG: 20 TABLET ORAL at 21:13

## 2020-09-09 RX ADMIN — OXYCODONE AND ACETAMINOPHEN 2 TABLET: 5; 325 TABLET ORAL at 23:55

## 2020-09-09 RX ADMIN — PREGABALIN 300 MG: 150 CAPSULE ORAL at 09:02

## 2020-09-09 RX ADMIN — SODIUM CHLORIDE, PRESERVATIVE FREE 10 ML: 5 INJECTION INTRAVENOUS at 22:37

## 2020-09-09 RX ADMIN — CYCLOBENZAPRINE 10 MG: 10 TABLET, FILM COATED ORAL at 09:01

## 2020-09-09 RX ADMIN — OXYCODONE AND ACETAMINOPHEN 2 TABLET: 5; 325 TABLET ORAL at 15:47

## 2020-09-09 RX ADMIN — FERROUS SULFATE TAB 325 MG (65 MG ELEMENTAL FE) 325 MG: 325 (65 FE) TAB at 09:09

## 2020-09-09 RX ADMIN — INSULIN GLARGINE 3 UNITS: 100 INJECTION, SOLUTION SUBCUTANEOUS at 21:15

## 2020-09-09 RX ADMIN — PANTOPRAZOLE SODIUM 40 MG: 40 INJECTION, POWDER, FOR SOLUTION INTRAVENOUS at 09:02

## 2020-09-09 RX ADMIN — HYDROXYUREA 500 MG: 500 CAPSULE ORAL at 09:01

## 2020-09-09 RX ADMIN — METOPROLOL TARTRATE 12.5 MG: 25 TABLET, FILM COATED ORAL at 21:13

## 2020-09-09 RX ADMIN — OXYCODONE AND ACETAMINOPHEN 2 TABLET: 5; 325 TABLET ORAL at 19:50

## 2020-09-09 RX ADMIN — DULOXETINE HYDROCHLORIDE 30 MG: 30 CAPSULE, DELAYED RELEASE ORAL at 09:02

## 2020-09-09 RX ADMIN — SODIUM CHLORIDE, PRESERVATIVE FREE 10 ML: 5 INJECTION INTRAVENOUS at 21:15

## 2020-09-09 RX ADMIN — SODIUM CHLORIDE, PRESERVATIVE FREE 10 ML: 5 INJECTION INTRAVENOUS at 09:09

## 2020-09-09 RX ADMIN — CYCLOBENZAPRINE 10 MG: 10 TABLET, FILM COATED ORAL at 14:32

## 2020-09-09 RX ADMIN — FLUTICASONE PROPIONATE 2 SPRAY: 50 SPRAY, METERED NASAL at 09:03

## 2020-09-09 RX ADMIN — CYCLOBENZAPRINE 10 MG: 10 TABLET, FILM COATED ORAL at 19:50

## 2020-09-09 RX ADMIN — OXYCODONE AND ACETAMINOPHEN 2 TABLET: 5; 325 TABLET ORAL at 07:34

## 2020-09-09 RX ADMIN — SODIUM CHLORIDE: 9 INJECTION, SOLUTION INTRAVENOUS at 11:50

## 2020-09-09 RX ADMIN — OXYCODONE AND ACETAMINOPHEN 2 TABLET: 5; 325 TABLET ORAL at 11:49

## 2020-09-09 RX ADMIN — FENOFIBRATE 54 MG: 54 TABLET ORAL at 09:01

## 2020-09-09 RX ADMIN — METOPROLOL TARTRATE 12.5 MG: 25 TABLET, FILM COATED ORAL at 11:41

## 2020-09-09 ASSESSMENT — PAIN DESCRIPTION - PROGRESSION: CLINICAL_PROGRESSION: NOT CHANGED

## 2020-09-09 ASSESSMENT — PAIN DESCRIPTION - ORIENTATION: ORIENTATION: UPPER;LOWER

## 2020-09-09 ASSESSMENT — PAIN DESCRIPTION - DESCRIPTORS
DESCRIPTORS: ACHING;CONSTANT
DESCRIPTORS: ACHING;CONSTANT;DISCOMFORT
DESCRIPTORS: ACHING;CONSTANT;DISCOMFORT

## 2020-09-09 ASSESSMENT — PAIN DESCRIPTION - PAIN TYPE
TYPE: ACUTE PAIN

## 2020-09-09 ASSESSMENT — PAIN - FUNCTIONAL ASSESSMENT
PAIN_FUNCTIONAL_ASSESSMENT: PREVENTS OR INTERFERES SOME ACTIVE ACTIVITIES AND ADLS

## 2020-09-09 ASSESSMENT — PAIN SCALES - GENERAL
PAINLEVEL_OUTOF10: 8
PAINLEVEL_OUTOF10: 9
PAINLEVEL_OUTOF10: 4
PAINLEVEL_OUTOF10: 2
PAINLEVEL_OUTOF10: 10
PAINLEVEL_OUTOF10: 3
PAINLEVEL_OUTOF10: 0
PAINLEVEL_OUTOF10: 9
PAINLEVEL_OUTOF10: 9

## 2020-09-09 ASSESSMENT — PAIN DESCRIPTION - ONSET: ONSET: ON-GOING

## 2020-09-09 ASSESSMENT — PAIN DESCRIPTION - LOCATION
LOCATION: GENERALIZED
LOCATION: GENERALIZED
LOCATION: ABDOMEN;BACK

## 2020-09-09 ASSESSMENT — PAIN DESCRIPTION - FREQUENCY: FREQUENCY: CONTINUOUS

## 2020-09-09 NOTE — CONSULTS
Inpatient Cardiology Consultation      Reason for Consult:  Tachycardia    Consulting Physician: Dr Randell Connelly    Requesting Physician:  Dr Selma Rangel    Date of Consultation: 9/9/2020    HISTORY OF PRESENT ILLNESS:   Mr Amira Dunn is a 58-year-old  male who was seen in initial consultation on 7/31/2020 by Dr. Leandra Oneil, otherwise not previously known to [facility]. He has a past medical history that includes diabetes mellitus, depression, hypertension, hyperlipidemia, obesity (BMI 31.8), patient reported history of suspected cardiac arrest secondary to hyperglycemia complicated with pulmonary embolism and splenic infarction. Hospitalization 7/31/2020-8/11/2020: Sent in from nursing home septic shock secondary to acute cholecystitis, coccyx wound. Radiology was asked to see the patient for a narrow complex tachycardia in the 130-150s. He converted to sinus rhythm on his own. He underwent CT-guided cholecystostomy tube placement 8/3/2020 and was discharged to skilled nursing facility on 8/11/2020. Presented to [facility] 9/3/2020 with acute blood loss anemia. Hemoglobin on admission 8.4. His Lovenox and aspirin are currently on hold. He underwent endoscopy 9/4/2020 that showed minimal gastritis. Colonoscopy on 9/5/2020 showed large friable nonbleeding mass and melena noted within the cecum. Biopsies were obtained and are pending. He has an elevated white count today of 14.8, previously 9.2 on admission. Cardiology was asked to see the patient for tachycardia. Heart rate has been sustaining in the low 722R, with systolic blood pressure 706-422. TSH  1.3 from 8/1/2020. He states that they were doing lab work at the nursing home when they told him that his hemoglobin was 5.0 with a sent him to the emergency room. He denies any lightheadedness dizziness, falls, loss of consciousness. He states that he has had increasing fatigue and shortness of breath since his previous admission.   He denies any chest pain, palpitations, orthopnea, PND, peripheral edema. He states that he has been wearing long-term oxygen therapy at the nursing home but is not wearing any right now. He states that his ambulation is limited secondary to the pain from his coccyx wound. He denies any palpitations or feelings of his heart racing. Currently he is sinus tachycardia at 118. Episodes of tachycardia in the 150s, difficult to see clear P waves. Was started on Lopressor 12.5mg twice daily. Please note: past medical records were reviewed per electronic medical record (EMR) - see detailed reports under Past Medical/ Surgical History. Past Medical and Surgical History:    1. Diabetes mellitus, A1c 6.3 (8/1/2020)   2. Depression  3. Hypertension,  4. Hyperlipidemia  5. Obesity (BMI 31.8)  6. patient reported history of suspected cardiac arrest secondary to pulmonary embolism and splenic infarction. 7. CT ab/pelvis [facility] 7/13/2020): probable filling defect within the distal right main and right lower lobe pulmonary artery. abnormal small bowel appearance as well as the new abnormal splenomegaly and possible acute splenic artery thrombosis  8. H/o hernia repair, right hand left hip arthroplasty, cervical fusion, vasectomy, multiple orthopedic surgeries including rotator cuff and wrist  9. Echocardiogram 8/3/2020 [facility]):  Technically difficult study - limited visualization. Micro-bubble contrast injected to enhance left ventricular visualization. Normal left ventricular size and systolic function. Ejection fraction is visually estimated at 60-65%. Normal diastolic function. No regional wall motion abnormalities seen. Mild left ventricular concentric hypertrophy noted. Abnormal LV septal motion consistent with conduction disorder. Normal right ventricular  size and function. No significant valvular abnormalities.         Medications Prior to admit:  Prior to Admission medications    Medication Sig Start Date End Date MG per tablet Take 1 tablet by mouth every 6 hours as needed for Pain.    Yes Historical Provider, MD   pantoprazole (PROTONIX) 40 MG tablet Take 40 mg by mouth daily   Yes Historical Provider, MD   DULoxetine (CYMBALTA) 30 MG extended release capsule Take 1 capsule by mouth daily 5/14/20  Yes IGOR Zepeda - CNP   pravastatin (PRAVACHOL) 20 MG tablet Take 1 tablet by mouth nightly 5/14/20  Yes IGOR Zepeda - CNP       Current Medications:    Current Facility-Administered Medications: metoprolol tartrate (LOPRESSOR) tablet 12.5 mg, 12.5 mg, Oral, BID  simethicone (MYLICON) chewable tablet 80 mg, 80 mg, Oral, Q6H PRN  oxyCODONE-acetaminophen (PERCOCET) 5-325 MG per tablet 1 tablet, 1 tablet, Oral, Q4H PRN **OR** oxyCODONE-acetaminophen (PERCOCET) 5-325 MG per tablet 2 tablet, 2 tablet, Oral, Q4H PRN  morphine (PF) injection 2 mg, 2 mg, Intravenous, Q4H PRN  insulin glargine (LANTUS) injection vial 3 Units, 3 Units, Subcutaneous, Nightly  pantoprazole (PROTONIX) injection 40 mg, 40 mg, Intravenous, BID **AND** sodium chloride (PF) 0.9 % injection 10 mL, 10 mL, Intravenous, BID  pregabalin (LYRICA) capsule 300 mg, 300 mg, Oral, BID  pravastatin (PRAVACHOL) tablet 20 mg, 20 mg, Oral, Nightly  miconazole (MICOTIN) 2 % powder, , Topical, BID  melatonin tablet 3 mg, 3 mg, Oral, Nightly PRN  hydroxyurea (HYDREA) chemo capsule 500 mg, 500 mg, Oral, BID  fluticasone (FLONASE) 50 MCG/ACT nasal spray 2 spray, 2 spray, Nasal, Daily  fenofibrate (TRICOR) tablet 54 mg, 54 mg, Oral, Daily  DULoxetine (CYMBALTA) extended release capsule 30 mg, 30 mg, Oral, Daily  cyclobenzaprine (FLEXERIL) tablet 10 mg, 10 mg, Oral, TID  sodium chloride flush 0.9 % injection 10 mL, 10 mL, Intravenous, 2 times per day  sodium chloride flush 0.9 % injection 10 mL, 10 mL, Intravenous, PRN  acetaminophen (TYLENOL) tablet 650 mg, 650 mg, Oral, Q6H PRN **OR** acetaminophen (TYLENOL) suppository 650 mg, 650 mg, Rectal, Q6H PRN  polyethylene glycol (GLYCOLAX) packet 17 g, 17 g, Oral, Daily PRN  promethazine (PHENERGAN) tablet 12.5 mg, 12.5 mg, Oral, Q6H PRN **OR** ondansetron (ZOFRAN) injection 4 mg, 4 mg, Intravenous, Q6H PRN  influenza quadrivalent split vaccine (FLUZONE;FLUARIX;FLULAVAL;AFLURIA) injection 0.5 mL, 0.5 mL, Intramuscular, Prior to discharge  insulin lispro (HUMALOG) injection vial 0-6 Units, 0-6 Units, Subcutaneous, TID WC  insulin lispro (HUMALOG) injection vial 0-3 Units, 0-3 Units, Subcutaneous, Nightly  glucose (GLUTOSE) 40 % oral gel 15 g, 15 g, Oral, PRN  dextrose 50 % IV solution, 12.5 g, Intravenous, PRN  glucagon (rDNA) injection 1 mg, 1 mg, Intramuscular, PRN  dextrose 5 % solution, 100 mL/hr, Intravenous, PRN  0.9 % sodium chloride infusion, , Intravenous, Continuous  ferrous sulfate (IRON 325) tablet 325 mg, 325 mg, Oral, BID    Allergies:  Seasonal and Tramadol    Social History:    Living in a skilled nursing facility. Wearing long-term oxygen therapy. Does not use any ambulation assistance devices. Denies alcohol, tobacco, illicit drug use. Full code    Family History: Mother, heart attack at age 79. REVIEW OF SYSTEMS:     · Constitutional: Denies fevers, chills, night sweats, + fatigue  · HEENT: Denies headaches, nose bleeds, and blurred vision,oral pain, abscess or lesion. · Musculoskeletal: Denies falls, pain to BLE with ambulation and edema to BLE. · Neurological: Denies dizziness and lightheadedness, numbness and tingling  · Cardiovascular: Denies chest pain, palpitations, and feelings of heart racing. · Respiratory: Denies orthopnea and PND, cough, GILLIAM  · Gastrointestinal: Denies heartburn, nausea/vomiting, diarrhea and constipation, black/bloody, and tarry stools.    · Genitourinary: Denies dysuria and hematuria  · Hematologic: Denies excessive bruising or bleeding  · Lymphatic: Denies lumps and bumps to neck, axilla, breast, and groin      PHYSICAL EXAM:   /67   Pulse 116   Temp 97.6 °F (36.4 °C) (Temporal)   Resp 16   Ht 6' (1.829 m)   Wt 234 lb 12.6 oz (106.5 kg)   SpO2 99%   BMI 31.84 kg/m²   CONST:  Well developed, obese  male who appears his stated age. Awake, alert, cooperative, no apparent distress  HEENT:   Head- Normocephalic, atraumatic   Eyes- Conjunctivae pink, anicteric  Throat- Oral mucosa pink and moist  Neck-  No stridor, trachea midline, no jugular venous distention. CHEST: Chest symmetrical and non-tender to palpation. RESPIRATORY: Lung sounds clear throughout fields bilaterally. No wheeze or rhonchi noted. CARDIOVASCULAR:     No carotid bruit noted bilaterally   Heart Ausculation- Regular rhythm, tachycardic no murmur  PV: No lower extremity edema. No varicosities. ABDOMEN: Soft, non-tender to light palpation. Bowel sounds present. MS: Good muscle strength and tone. No atrophy or abnormal movements. : Deferred   SKIN: Warm and dry no statis dermatitis or ulcers   NEURO / PSYCH: Oriented to person, place and time. Speech clear and appropriate. Follows all commands. Pleasant affect     DATA:    ECG: No EKG this admission  Tele strips: Sinus tachycardia at 118. Diagnostic:      Intake/Output Summary (Last 24 hours) at 9/9/2020 1102  Last data filed at 9/9/2020 0713  Gross per 24 hour   Intake 1669 ml   Output 640 ml   Net 1029 ml       Labs:   CBC:   Recent Labs     09/08/20  0415 09/09/20  0657   WBC 9.2 14.8*   HGB 7.6* 8.8*   HCT 24.6* 28.2*   PLT 1,218* 1,283*       HgA1c:   Lab Results   Component Value Date    LABA1C 6.3 (H) 08/01/2020     FASTING LIPID PANEL:  Lab Results   Component Value Date    CHOL 148 06/01/2020    HDL 35 06/01/2020    LDLCALC 63 06/01/2020    TRIG 252 06/01/2020       Assessment:   1. Tachycardia, currently in sinus at 118. Episodes of tachycardia in the 150s with no clear P wave -cannot rule out SVT or A. Fib/flutter  2. Acute blood loss anemia hemoglobin 8.4 on admission --> 8.8 today.   Lovenox and aspirin currently on gastritis. Colonoscopy done revealed large friable nonbleeding mass in the cecum. Patient denies palpitation but has been complaining of abdominal pain and pain in his coccyx area due to wound. He denies chest discomfort or dyspnea. Reviewed the PMH, social history, FH and ROS from APRN note. Agree with the findings. See the full consult note for details. PE:   /71   Pulse 109   Temp 97.3 °F (36.3 °C) (Temporal)   Resp 16   Ht 6' (1.829 m)   Wt 234 lb 12.6 oz (106.5 kg)   SpO2 99%   BMI 31.84 kg/m²   CONST: Young obese male who appears of stated age. Awake, alert, cooperative, no apparent distress. HEENT: Head- normocephalic, atraumatic. Neck: no jugular venous distention. No carotid bruit noted. LUNGS: Bilateral expiratory wheezing. CARDIOVASCULAR: Regular and tachycardic, no murmur, s3, s4 or rub noted. PV: No extremity edema. No varicosities. Pedal pulses palpable, no clubbing or cyanosis   ABDOMEN: Soft, right upper quadrant tender to light palpation. Bowel sounds present. No palpable masses no hepatosplenomegaly or splenomegaly; no abdominal bruit / pulsation  SKIN: Warm and dry. NEURO / PSYCH: Oriented to person, place and time. Speech clear and appropriate. Follows all commands. Pleasant affect. No EKG done during this admission. Lab Review        Recent Labs     09/07/20  0633 09/08/20  0415 09/09/20  0657   WBC 7.7 9.2 14.8*   HGB 7.5* 7.6* 8.8*   HCT 24.1* 24.6* 28.2*   PLT 1,000* 1,218* 1,283*         Last 3 Troponin:    Lab Results   Component Value Date    TROPONINI <0.01 08/06/2020    TROPONINI 0.07 08/02/2020    TROPONINI 0.02 08/02/2020      Echocardiogram done on 8/3/2020 revealed no valvular heart disease with an ejection fraction of 60 to 65%. Assessment:  -Tachycardia: Sinus tachycardia on telemetry probably due to pain and anemia.  -Anemia.   -Right upper quadrant pain with recent cholecystitis, status post tube placement.  -Large cecal mass.  -Leukocytosis and thrombocytosis.  -History of PE.  -Diabetes. -Hypertension.  -Hyperlipidemia.  -Obesity. Plan:  -Increase Lopressor to 25 mg twice daily and titrate up as blood pressure allows.  -Continue monitoring on telemetry.  -If unable to resume chronic anticoagulation (recent pulmonary embolism), and if colon surgery is needed, would recommend IVC filter to prevent recurrence of PE. Thank you for the consult. Will follow. Electronically signed by Ligia Wilson MD on 9/9/2020 at 1:57 PM  [facility].

## 2020-09-09 NOTE — PROGRESS NOTES
07/04/2014    Osteoarthritis 03/27/2014    Lumbar spondylosis 01/07/2014    Neuropathic pain 05/08/2012       GI bleed--unspecified source  Await biopsies from EGD and colonoscopy  Diet as tolerated  Monitor H/H  Continue cholecystostomy tube  OOB/ambulate  PCDs  dispo per primary service    Moon Chapin MD, FACS  9/9/2020  1:25 PM      NOTE: This report was transcribed using voice recognition software. Every effort was made to ensure accuracy; however, inadvertent computerized transcription errors may be present.

## 2020-09-09 NOTE — PLAN OF CARE
Problem: Falls - Risk of:  Goal: Will remain free from falls  9/9/2020 0516 by Sanaz Lewis RN  Outcome: Met This Shift  9/8/2020 2210 by Merline Palin, RN  Outcome: Met This Shift     Problem: Falls - Risk of:  Goal: Absence of physical injury  9/9/2020 0516 by Sanaz Lewis RN  Outcome: Met This Shift  9/8/2020 2210 by Merline Palin, RN  Outcome: Met This Shift     Problem: Pain:  Goal: Pain level will decrease  9/9/2020 0516 by Sanaz Lewis RN  Outcome: Met This Shift  9/8/2020 2210 by Merline Palin, RN  Outcome: Met This Shift     Problem: Pain:  Goal: Control of acute pain  9/9/2020 0516 by Sanaz Lewis RN  Outcome: Met This Shift  9/8/2020 2210 by Merline Palin, RN  Outcome: Met This Shift     Problem: Pain:  Goal: Control of chronic pain  9/9/2020 0516 by Sanaz Lewis RN  Outcome: Met This Shift  9/8/2020 2210 by Merline Palin, RN  Outcome: Met This Shift

## 2020-09-09 NOTE — PROGRESS NOTES
Hospitalist Progress Note      PCP: Jovanny Guallpa, APRN - CNP    Date of Admission: 9/3/2020    Chief Complaint: [id number] [facility] Course: This is a 42-year-old male with past medical history of diabetes mellitus, hypertension, chronic back pain who presented to the emergency department for abnormal labs. He had outpatient labs a revealed hemoglobin at 8.4 g/dL, was 10.2 g/dL about a month prior. Admits to melanotic stool,  progressive weakness. He was evaluated by general surgeon and underwent EGD that showed mild gastritis, subsequent colonoscopy revealed cecal lesion with multiple friable polyps, difficulty to delineate if 1 large mass versus multiple polyps; did have evidence of dark blood in the cecum. Bleeding scan was recommended, which is currently pending; antiplatelet therapy and apixaban have been held. Subjective: Pt was seen and examined at bedside. No acute event overnight; denies any abdominal pain, bloody bowel movement or nausea. No palpitation, CP, or SOB.        Medications:  Reviewed    Infusion Medications    dextrose      sodium chloride 75 mL/hr at 09/09/20 1150     Scheduled Medications    metoprolol tartrate  12.5 mg Oral BID    insulin glargine  3 Units Subcutaneous Nightly    pantoprazole  40 mg Intravenous BID    And    sodium chloride (PF)  10 mL Intravenous BID    pregabalin  300 mg Oral BID    pravastatin  20 mg Oral Nightly    miconazole   Topical BID    hydroxyurea  500 mg Oral BID    fluticasone  2 spray Nasal Daily    fenofibrate  54 mg Oral Daily    DULoxetine  30 mg Oral Daily    cyclobenzaprine  10 mg Oral TID    sodium chloride flush  10 mL Intravenous 2 times per day    influenza virus vaccine  0.5 mL Intramuscular Prior to discharge    insulin lispro  0-6 Units Subcutaneous TID     insulin lispro  0-3 Units Subcutaneous Nightly    ferrous sulfate  325 mg Oral BID     PRN Meds: simethicone, oxyCODONE-acetaminophen **OR** oxyCODONE-acetaminophen, morphine, melatonin, sodium chloride flush, acetaminophen **OR** acetaminophen, polyethylene glycol, promethazine **OR** ondansetron, glucose, dextrose, glucagon (rDNA), dextrose      Intake/Output Summary (Last 24 hours) at 9/9/2020 1310  Last data filed at 9/9/2020 1140  Gross per 24 hour   Intake 1669 ml   Output 610 ml   Net 1059 ml       Exam:    /71   Pulse 109   Temp 97.3 °F (36.3 °C) (Temporal)   Resp 16   Ht 6' (1.829 m)   Wt 234 lb 12.6 oz (106.5 kg)   SpO2 99%   BMI 31.84 kg/m²     General appearance: Sitting comfortably in bed. No apparent distress. Respiratory: Clear to auscultation bilaterally. Cardiovascular: Normal S1/S2. Regular rhythm, tachycardic. Abdomen: Soft, non-tender, non-distended with normal bowel sounds. Cholecystectomy tube in place  Musculoskeletal: No clubbing, cyanosis or edema bilaterally. Skin: Sacral decubitus ulcer   Neurologic:  No focal deficit. Psychiatric: Alert and oriented, thought content appropriate, normal insight  Peripheral Pulses: +2 palpable, equal bilaterally       Labs:   Recent Labs     09/07/20  0633 09/08/20  0415 09/09/20  0657   WBC 7.7 9.2 14.8*   HGB 7.5* 7.6* 8.8*   HCT 24.1* 24.6* 28.2*   PLT 1,000* 1,218* 1,283*     No results for input(s): NA, K, CL, CO2, BUN, CREATININE, CALCIUM, PHOS in the last 72 hours. Invalid input(s): MAGNES  No results for input(s): AST, ALT, BILIDIR, BILITOT, ALKPHOS in the last 72 hours. No results for input(s): INR in the last 72 hours. No results for input(s): Dulcie Mean in the last 72 hours. Radiology:  NM GI BLOOD LOSS   Final Result   No abnormal tracer identified to suggest GI bleed.                                         Active Hospital Problems    Diagnosis Date Noted    Acute GI bleeding [K92.2] 09/03/2020    Acute blood loss anemia [D62]     Thrombocytosis (HCC) [D47.3] 08/10/2020    Type 2 diabetes mellitus ([address] [E11.9] 04/08/2016    Class 1 obesity in adult [E66.9] 12/17/2015    Essential hypertension [I10] 10/16/2015    Neuropathic pain [M79.2] 05/08/2012       Assessment  1. GI bleeding - status post EGD on 9/4 which only showed minimal gastritis. Colonscopy on 09/05 showed Cecal mass and melena at Cecum and throughout the colon. Bleeding scan showed no abnormal tracer identified to suggest GI bleed  2. Acute blood loss anemia - likely secondary to above superimposed on baseline anemia of chronic disease  3. Hypotension - suspect due to secondary to hypovolemia, draining cholecystostomy tube likely contributory. Improved BP with IV hydration, holding lisinopril  4. Sinus Tachycardia - etiology unclear; HR at 120 bpm but patient denies any palpitation. 5. Type II DM with long-term insulin use and with complication - rate controlled  6. Diabetic neuropathy  7. Hyperlipidemia, mixed  8. Chronic anticoagulation - on apixaban for history of pulmonary embolism, #9; currently on hold for #1.   9. Thrombocytosis - on Hydroxyurea. 10. Hx of  Splenic thrombosis   11. Hx of splenic infarction - This was likely to be due to splenic thrombosis  12. Sacral decubitus wound, stage II/III (POA)   13. Recent acute cholecystitis - currently patient has cholecystomy tube in place  14. Obesity - Body mass index is 31.84 kg/m².    15. Osteoarthritis of hip joint - status post replacement        Plan:  Obtain EKG  Start Lopressor 12.5 mg BID  Will re-consult Cardiologist  Continue to monitor H&H, transfuse PRBC to keep hemoglobin above 7 g/dL  On PPI  Awaiting pathology report from cecal mass biopsy  Holding ASA, apixaban; plan to resume once okay with surgeon  Continue holding lisinopril for now  PRN analgesia  Discontinue IVF  Basal and corrective bolus insulin regimen    DVT Prophylaxis: SCDs, no VTE chemoprophylaxis in light of suspected GI bleed  Diet: DIET GENERAL;  Code Status: Full Code    PT/OT Eval Status: Ongoing    Dispo - SNF once stable    Leeroy Parr MD 9/9/2020 1:10 PM

## 2020-09-10 LAB
HCT VFR BLD CALC: 24.2 % (ref 37–54)
HEMOGLOBIN: 7.5 G/DL (ref 12.5–16.5)
MCH RBC QN AUTO: 27.5 PG (ref 26–35)
MCHC RBC AUTO-ENTMCNC: 31 % (ref 32–34.5)
MCV RBC AUTO: 88.6 FL (ref 80–99.9)
METER GLUCOSE: 110 MG/DL (ref 74–99)
METER GLUCOSE: 117 MG/DL (ref 74–99)
METER GLUCOSE: 128 MG/DL (ref 74–99)
METER GLUCOSE: 138 MG/DL (ref 74–99)
PDW BLD-RTO: 18.6 FL (ref 11.5–15)
PLATELET # BLD: 957 E9/L (ref 130–450)
PMV BLD AUTO: 7.9 FL (ref 7–12)
RBC # BLD: 2.73 E12/L (ref 3.8–5.8)
WBC # BLD: 12 E9/L (ref 4.5–11.5)

## 2020-09-10 PROCEDURE — 36415 COLL VENOUS BLD VENIPUNCTURE: CPT

## 2020-09-10 PROCEDURE — APPSS30 APP SPLIT SHARED TIME 16-30 MINUTES: Performed by: PHYSICIAN ASSISTANT

## 2020-09-10 PROCEDURE — C9113 INJ PANTOPRAZOLE SODIUM, VIA: HCPCS | Performed by: STUDENT IN AN ORGANIZED HEALTH CARE EDUCATION/TRAINING PROGRAM

## 2020-09-10 PROCEDURE — 97535 SELF CARE MNGMENT TRAINING: CPT

## 2020-09-10 PROCEDURE — 6370000000 HC RX 637 (ALT 250 FOR IP): Performed by: INTERNAL MEDICINE

## 2020-09-10 PROCEDURE — 2580000003 HC RX 258: Performed by: STUDENT IN AN ORGANIZED HEALTH CARE EDUCATION/TRAINING PROGRAM

## 2020-09-10 PROCEDURE — 85027 COMPLETE CBC AUTOMATED: CPT

## 2020-09-10 PROCEDURE — 82962 GLUCOSE BLOOD TEST: CPT

## 2020-09-10 PROCEDURE — 6370000000 HC RX 637 (ALT 250 FOR IP): Performed by: PHYSICIAN ASSISTANT

## 2020-09-10 PROCEDURE — 6370000000 HC RX 637 (ALT 250 FOR IP): Performed by: STUDENT IN AN ORGANIZED HEALTH CARE EDUCATION/TRAINING PROGRAM

## 2020-09-10 PROCEDURE — 6360000002 HC RX W HCPCS: Performed by: STUDENT IN AN ORGANIZED HEALTH CARE EDUCATION/TRAINING PROGRAM

## 2020-09-10 PROCEDURE — 6370000000 HC RX 637 (ALT 250 FOR IP): Performed by: HOSPITALIST

## 2020-09-10 PROCEDURE — 97530 THERAPEUTIC ACTIVITIES: CPT

## 2020-09-10 PROCEDURE — 2060000000 HC ICU INTERMEDIATE R&B

## 2020-09-10 RX ADMIN — PREGABALIN 300 MG: 150 CAPSULE ORAL at 08:25

## 2020-09-10 RX ADMIN — FERROUS SULFATE TAB 325 MG (65 MG ELEMENTAL FE) 325 MG: 325 (65 FE) TAB at 08:34

## 2020-09-10 RX ADMIN — FERROUS SULFATE TAB 325 MG (65 MG ELEMENTAL FE) 325 MG: 325 (65 FE) TAB at 20:40

## 2020-09-10 RX ADMIN — HYDROXYUREA 500 MG: 500 CAPSULE ORAL at 20:40

## 2020-09-10 RX ADMIN — FENOFIBRATE 54 MG: 54 TABLET ORAL at 08:31

## 2020-09-10 RX ADMIN — FLUTICASONE PROPIONATE 2 SPRAY: 50 SPRAY, METERED NASAL at 08:24

## 2020-09-10 RX ADMIN — PANTOPRAZOLE SODIUM 40 MG: 40 INJECTION, POWDER, FOR SOLUTION INTRAVENOUS at 20:39

## 2020-09-10 RX ADMIN — DULOXETINE HYDROCHLORIDE 30 MG: 30 CAPSULE, DELAYED RELEASE ORAL at 08:24

## 2020-09-10 RX ADMIN — OXYCODONE AND ACETAMINOPHEN 2 TABLET: 5; 325 TABLET ORAL at 18:21

## 2020-09-10 RX ADMIN — OXYCODONE AND ACETAMINOPHEN 2 TABLET: 5; 325 TABLET ORAL at 03:55

## 2020-09-10 RX ADMIN — PREGABALIN 300 MG: 150 CAPSULE ORAL at 20:40

## 2020-09-10 RX ADMIN — INSULIN GLARGINE 3 UNITS: 100 INJECTION, SOLUTION SUBCUTANEOUS at 20:41

## 2020-09-10 RX ADMIN — CYCLOBENZAPRINE 10 MG: 10 TABLET, FILM COATED ORAL at 08:24

## 2020-09-10 RX ADMIN — PRAVASTATIN SODIUM 20 MG: 20 TABLET ORAL at 20:40

## 2020-09-10 RX ADMIN — CYCLOBENZAPRINE 10 MG: 10 TABLET, FILM COATED ORAL at 14:31

## 2020-09-10 RX ADMIN — SODIUM CHLORIDE, PRESERVATIVE FREE 10 ML: 5 INJECTION INTRAVENOUS at 08:36

## 2020-09-10 RX ADMIN — SODIUM CHLORIDE, PRESERVATIVE FREE 10 ML: 5 INJECTION INTRAVENOUS at 20:42

## 2020-09-10 RX ADMIN — PANTOPRAZOLE SODIUM 40 MG: 40 INJECTION, POWDER, FOR SOLUTION INTRAVENOUS at 08:24

## 2020-09-10 RX ADMIN — OXYCODONE AND ACETAMINOPHEN 2 TABLET: 5; 325 TABLET ORAL at 22:18

## 2020-09-10 RX ADMIN — SODIUM CHLORIDE, PRESERVATIVE FREE 10 ML: 5 INJECTION INTRAVENOUS at 08:26

## 2020-09-10 RX ADMIN — METOPROLOL TARTRATE 25 MG: 25 TABLET, FILM COATED ORAL at 20:40

## 2020-09-10 RX ADMIN — CYCLOBENZAPRINE 10 MG: 10 TABLET, FILM COATED ORAL at 20:40

## 2020-09-10 RX ADMIN — OXYCODONE AND ACETAMINOPHEN 2 TABLET: 5; 325 TABLET ORAL at 08:14

## 2020-09-10 RX ADMIN — OXYCODONE AND ACETAMINOPHEN 2 TABLET: 5; 325 TABLET ORAL at 12:16

## 2020-09-10 RX ADMIN — HYDROXYUREA 500 MG: 500 CAPSULE ORAL at 08:25

## 2020-09-10 RX ADMIN — SODIUM CHLORIDE, PRESERVATIVE FREE 10 ML: 5 INJECTION INTRAVENOUS at 20:41

## 2020-09-10 ASSESSMENT — PAIN SCALES - GENERAL
PAINLEVEL_OUTOF10: 8
PAINLEVEL_OUTOF10: 8
PAINLEVEL_OUTOF10: 10
PAINLEVEL_OUTOF10: 6
PAINLEVEL_OUTOF10: 10
PAINLEVEL_OUTOF10: 8

## 2020-09-10 NOTE — CONSULTS
Vascular Surgery Consultation Note    Reason for Consult:  IVCF placement    HPI:    This is a 53 y.o. male w/ a complicated medical Hx. He had an MI and was hospitalized in [place] for a month and a half where he developed DVTs and PEs as a result. He was on anticoagulation until he started having a GI bleed. Vascular surgery was consulted for placement of IVC filter.       ROS: Negative if blank [], Positive if [x]  General Vascular   [] Fevers [] Claudication (Blocks)   [] Chills [] Rest Pain   [] Weight Loss [] Tissue Loss   [] Chest Pain [] Clotting Disorder   [] SOB at rest [] Leg Swelling   [] SOB with exertion [x] DVT/PE      [] Nausea    [] Vomiting [] Stroke/TIA   [x] Abdominal Pain [x] Focal weakness   [] Melena [] Slurred Speech   [] Hematochezia [] Vision Changes   [] Hematuria    [] Dysuria [x] Hx of Central Catheters   [x] Wears Glasses/Contacts [x] Dialysis and If so date initiated   [] Blindness    [x] Right Hand Dominant   [] Difficulty swallowing        Past Medical History:   Diagnosis Date    Accident 11/2019    stepped on nail rt ft about 1 month ago- healed per patient    SIMÓN (acute kidney injury) [facility] 2008 apx    kidney bruised due to fall / [place]     Allergic rhinitis     Chronic back pain     Depression     Diabetes mellitus ([facility]     Difficulty sleeping     at times    Displacement of lumbar intervertebral disc without myelopathy     Fibromyalgia     Fractured rib     2008 / healed    H/O seasonal allergies     Head injury 1980'S apx    no residual s/s    Hyperlipidemia     Hypertension     Obesity (BMI 35.0-39.9 without comorbidity)     bmi 39.2  weight 296 #    Osteoarthritis     Thoracic or lumbosacral neuritis or radiculitis, unspecified         Past Surgical History:   Procedure Laterality Date    COLONOSCOPY N/A 9/5/2020    COLONOSCOPY WITH BIOPSY performed by Argelia Rahman MD at [address] [facility] NEW ACCESS  8/3/2020    CT PTC NEW ACCESS 8/3/2020 [facility]    FOOT SURGERY Right 1985    to treat shattered bones    HERNIA REPAIR  2001    DOUBLE HERNIA    HERNIA REPAIR Right 12/9/2019    LAPAROSCOPIC RIGHT INGUINAL HERNIA REPAIR, MESH 10x15 cm PLACEMENT performed by Patricia Hernandez MD at [address] [facility] Left 4/11/2016    NECK SURGERY  2000    FUSION    IN COLONOSCOPY FLX DX W/COLLJ SPEC WHEN PFRMD N/A 5/7/2018    COLONOSCOPY DIAGNOSTIC performed by Darren Martini MD at [address] [facility] Left 2014    Dr. Marc Rothman ARTHROSCOPY Left 11 21 14    SHOULDER SURGERY  2001 &2007    RIGHT AND LEFT repair of tears    TOTAL HIP ARTHROPLASTY Right 10/31/2016    Total right hip  Nelida Rutherford MD    UPPER GASTROINTESTINAL ENDOSCOPY N/A 9/4/2020    EGD DIAGNOSTIC ONLY performed by Mary Valentin MD at [place] [address]  2007    LEFT WRIST       Current Medications:    dextrose        simethicone, oxyCODONE-acetaminophen **OR** oxyCODONE-acetaminophen, morphine, melatonin, sodium chloride flush, acetaminophen **OR** acetaminophen, polyethylene glycol, promethazine **OR** ondansetron, glucose, dextrose, glucagon (rDNA), dextrose    [START ON 9/11/2020] ceFAZolin  2 g Intravenous See Admin Instructions    metoprolol tartrate  12.5 mg Oral BID    insulin glargine  3 Units Subcutaneous Nightly    pantoprazole  40 mg Intravenous BID    And    sodium chloride (PF)  10 mL Intravenous BID    pregabalin  300 mg Oral BID    pravastatin  20 mg Oral Nightly    miconazole   Topical BID    hydroxyurea  500 mg Oral BID    fluticasone  2 spray Nasal Daily    fenofibrate  54 mg Oral Daily    DULoxetine  30 mg Oral Daily    cyclobenzaprine  10 mg Oral TID    sodium chloride flush  10 mL Intravenous 2 times per day    influenza virus vaccine  0.5 mL Intramuscular Prior to discharge    insulin lispro  0-6 Units Subcutaneous TID WC    insulin lispro  0-3 Units Subcutaneous Nightly    ferrous sulfate 325 mg Oral BID        Allergies:  Seasonal and Tramadol    Social History     Socioeconomic History    Marital status:      Spouse name: Not on file    Number of children: Not on file    Years of education: Not on file    Highest education level: Not on file   Occupational History    Occupation: disabled from     Social Needs    Financial resource strain: Not on file    Food insecurity     Worry: Not on file     Inability: Not on file   [employer] needs     Medical: Not on file     Non-medical: Not on file   Tobacco Use    Smoking status: Never Smoker    Smokeless tobacco: Current User     Types: Chew   Substance and Sexual Activity    Alcohol use: Not Currently     Alcohol/week: 0.0 standard drinks     Frequency: Monthly or less    Drug use: No    Sexual activity: Not Currently   Lifestyle    Physical activity     Days per week: Not on file     Minutes per session: Not on file    Stress: Not on file   Relationships    Social connections     Talks on phone: Not on file     Gets together: Not on file     Attends Islam service: Not on file     Active member of club or organization: Not on file     Attends meetings of clubs or organizations: Not on file     Relationship status: Not on file    Intimate partner violence     Fear of current or ex partner: Not on file     Emotionally abused: Not on file     Physically abused: Not on file     Forced sexual activity: Not on file   Other Topics Concern    Not on file   Social History Narrative    Not on file        Family History   Problem Relation Age of Onset    Hypertension Mother     Arthritis Father     Diabetes Father     Cancer Maternal Grandfather         Skin    Cancer Paternal Uncle         skin    Diabetes Paternal Grandfather     Heart Disease Maternal Grandmother     Stroke Maternal Aunt        PHYSICAL EXAM:    /72   Pulse 110   Temp 97 °F (36.1 °C) (Temporal)   Resp 18   Ht 6' (1.829 m)   [employer]tri [employer] 235 lb 6.4 oz (106.8 kg)   SpO2 98%   BMI 31.93 kg/m²   CONSTITUTIONAL:  awake, alert, cooperative, no apparent distress, and appears stated age  NEURO:  Normal  EYES:  lids and lashes normal, sclera clear and conjunctiva normal  ENT:  normocepalic, without obvious abnormality, external ears without lesions  NECK:  supple, symmetrical, trachea midline, ,   LUNGS:  no increased work of breathing  CARDIOVASCULAR:  regular rate and rhythm  ABDOMEN:  soft, non-distended, mild tenderness that is chronic from splenic infarction,   SKIN:  no bruising or bleeding  EXTREMITIES: RUE: 0/5 strength at wrist 3/5 strength in hand  RLE medial ankle scar from MRSA infection. 2+ bilateral radial/DP/PT pulses      LABS:    Lab Results   Component Value Date    WBC 12.0 (H) 09/10/2020    HGB 7.5 (L) 09/10/2020    HCT 24.2 (L) 09/10/2020     (H) 09/10/2020    PROTIME 13.3 (H) 09/03/2020    INR 1.2 09/03/2020    K 4.2 09/04/2020    BUN 14 09/04/2020    CREATININE 0.8 09/04/2020       RADIOLOGY:  No results found.       Assesment/Plan  53 y.o. male with intolerance of blood thinners    OR tomorrow for IVCF placement    Leonid Pryor MD  9/10/20  2:39 PM EDT

## 2020-09-10 NOTE — PROGRESS NOTES
Hospitalist Progress Note      PCP: Kalpana Savage, APRN - CNP    Date of Admission: 9/3/2020    Chief Complaint: [id number] [facility] Course: This is a 24-year-old male with past medical history of diabetes mellitus, hypertension, chronic back pain who presented to the emergency department for abnormal labs. He had outpatient labs a revealed hemoglobin at 8.4 g/dL, was 10.2 g/dL about a month prior. Admits to melanotic stool,  progressive weakness. He was evaluated by general surgeon and underwent EGD that showed mild gastritis, subsequent colonoscopy revealed cecal lesion with multiple friable polyps, difficulty to delineate if 1 large mass versus multiple polyps; did have evidence of dark blood in the cecum. Bleeding scan was recommended, which is currently pending; antiplatelet therapy and apixaban have been held. Subjective: Pt was seen and examined at bedside. No acute event overnight; denies any abdominal pain, bloody bowel movement or nausea. No palpitation, CP, or SOB.        Medications:  Reviewed    Infusion Medications    dextrose       Scheduled Medications    metoprolol tartrate  12.5 mg Oral BID    insulin glargine  3 Units Subcutaneous Nightly    pantoprazole  40 mg Intravenous BID    And    sodium chloride (PF)  10 mL Intravenous BID    pregabalin  300 mg Oral BID    pravastatin  20 mg Oral Nightly    miconazole   Topical BID    hydroxyurea  500 mg Oral BID    fluticasone  2 spray Nasal Daily    fenofibrate  54 mg Oral Daily    DULoxetine  30 mg Oral Daily    cyclobenzaprine  10 mg Oral TID    sodium chloride flush  10 mL Intravenous 2 times per day    influenza virus vaccine  0.5 mL Intramuscular Prior to discharge    insulin lispro  0-6 Units Subcutaneous TID WC    insulin lispro  0-3 Units Subcutaneous Nightly    ferrous sulfate  325 mg Oral BID     PRN Meds: simethicone, oxyCODONE-acetaminophen **OR** oxyCODONE-acetaminophen, morphine, melatonin, sodium chloride hypertension [I10] 10/16/2015    Neuropathic pain [M79.2] 05/08/2012       Assessment  1. GI bleeding - status post EGD on 9/4 which only showed minimal gastritis. Colonscopy on 09/05 showed Cecal mass and melena at Cecum and throughout the colon. Bleeding scan showed no abnormal tracer identified to suggest GI bleed  2. Acute blood loss anemia - likely secondary to above superimposed on baseline anemia of chronic disease  3. Hypotension - suspect due to secondary to hypovolemia, draining cholecystostomy tube likely contributory. Improved BP with IV hydration, holding lisinopril  4. Sinus Tachycardia - etiology unclear; HR at 120 bpm but patient denies any palpitation. 5. Type II DM with long-term insulin use and with complication - rate controlled  6. Diabetic neuropathy  7. Hyperlipidemia, mixed  8. Chronic anticoagulation - on apixaban for #s 9&10; currently on hold for #1.   9. History of pulmonary embolism  10. Thrombocytosis - on Hydroxyurea. 11. Hx of  Splenic thrombosis   12. Hx of splenic infarction - This was likely to be due to splenic thrombosis  13. Sacral decubitus wound, stage II/III (POA)   14. Recent acute cholecystitis - currently patient has cholecystomy tube in place  15. Obesity - Body mass index is 31.93 kg/m².    16. Osteoarthritis of hip joint - status post replacement        Plan:  Appreciate cardiologist's consult, Lopressor increased to 25 mg BID  Vascular surgery consult for IVC filter in light of history of PE, now with relative contraindication to oral anticoagulation  Continue to monitor H&H, transfuse PRBC to keep hemoglobin above 7 g/dL  On PPI  Awaiting pathology report from cecal mass biopsy  Holding ASA, apixaban  PRN analgesia  Basal and corrective bolus insulin regimen  Discharge planning for home SNF likely after IVC filter placement    DVT Prophylaxis: SCDs, no VTE chemoprophylaxis in light of suspected GI bleed  Diet: DIET GENERAL;  Code Status: Full Code    PT/OT Carlos

## 2020-09-10 NOTE — CARE COORDINATION
Met with patient at the bedside and confirmed transition of care plan is to return to [address]. Patient confirms that is his plan. Cardiology increased Lopressor to 25 mg BID. Per cardiology, if anticoagulation to remain on hold, patient may need IVC filter. New Vascular Surgery consulted noted from this am.  Await their input. Transition of care plan remains to [address] at discharge, authorization pending per Jocelyn. COVID negative 9/4.       Glendy Conti.  P:  [phone]

## 2020-09-10 NOTE — PROGRESS NOTES
Set-up  Setup; Increased time due to limitation in wrist of right hand. Mod I   Grooming Supervision  Sup; To simulate washing face at the sink; pt stated he completed earlier this date. Mod I   UB Dressing SBA/min A  Patient donned shirt with SBA with min A to don  R UE sling for proper positioning Min A;    To don/doff t-shirt and sling while sitting up on the EOB due to pain in RUE. Mod I   LB Dressing Supervision  Patient donned B shoes while seated with SBA overall Supervision    Patient donned B shoes while seated with SBA.    Mod I   Bathing Supervision  Supervision     Per last session; pt completed earlier this date. Mod I   Toileting Supervision  supervision    Functional transfers to commode with use of grab bars and increased time due to lower surface. Mod I   Bed Mobility  Supine to sit: NT  Sit to supine: NT Supine to sit: Mod I  Sit to supine: N.T;    Pt left sitting up at the EOB with nursing at end of treatment. Supine to sit: I  Sit to supine: I   Functional Transfers Sit to stand: Supervision  Stand to sit: Supervision     Sit to stand: Supervision  Stand to sit: Supervision   Mod I   Functional Mobility CGA/Supervision  Patient completed functional mobility bed<>[facility] for household distance simulation with CGA/supervision overall using straight cane with min verbal cues for safety Supervision  With support of SPC     Bed to bathroom and to obtain items of clothing from chair in room. Cues for safety and support of RUE  Mod I functional household distance    Balance Sitting:     Static: SBA    Dynamic:SBA  Standing: Supervision Sitting:     Static: Mod I     Dynamic:Sup    Standing: Supervision with SPC     Activity Tolerance Fair  Fair+ Good   Visual/  Perceptual Glasses: no     No      Safety Fair  Fair Good   B UE UE ROM:    RUE: Per patient, patient is R UE NWB with sling - patient unable to provide additional information regarding injury or precautions.  Patient reported injury occurred in [place] earlier this year and patient never had follow-up- patient reports R UE flaccid but B shoulder and elbow volitional movement observed. Patient demonstrates wrist drop  L UE: WFL     Strength:            RUE: NT due to patient report of NWB R UE            LUE:  4/5      Strength: L : WFL          Coordination Fine Motor Coordination: L UE: WFL      Gross Motor Coordination:  L UE: WFL         Treatment: OT treatment provided this date includes:       Comments: Upon arrival pt in supine on phone. Pt agreeable to therapy session. Pt educated on ADL training-  Instruction/training on safety and adapted techniques for completion of ADLs: mango/doff of UB clothing and increased safety while standing at the sink. Mobility training-  Instruction/training on safety and improved independence with functional transfers and functional mobility. Education on improving tolerance. Therapeutic Exercises- Instruction on RUE ROM exercises to improve strength and function of BUE for improved indep with ADLs. At end of session pt sitting EOB call light within reach and nursing in room. · Pt has made good progress towards set goals.    · Continue with current plan of care    Treatment Time In: 1110           Treatment Time Out: 1125            Treatment Charges: Mins Units   Ther Ex  [id number]     Manual Therapy [phone]     Thera Activities [id number]     ADL/Home Mgt [id number] 13 [id number]   Neuro Re-ed [id number]     Group Therapy      Orthotic manage/training  [id number]     Non-Billable Time 2 0   Total Timed Treatment 15 [address] [facility], [address] [place] [address] Rd

## 2020-09-10 NOTE — PROGRESS NOTES
Inpatient Cardiology Progress note     PATIENT IS BEING FOLLOWED FOR: Tachycardia     Mak Osman II is a 53 y.o. male who follows with Dr Aletha Stearns: States he is feeling ok today, denies chest pain or palpitations. OBJECTIVE: No apparent distress, resting flat in bed     ROS:  Consist: Denies fevers, chills or night sweats  Heart: Denies chest pain, palpitations, lightheadedness, dizziness or syncope  Lungs: Denies SOB, cough, wheezing, orthopnea or PND  GI: Denies abdominal pain, vomiting or diarrhea    PHYSICAL EXAM:   /72   Pulse 110   Temp 97 °F (36.1 °C) (Temporal)   Resp 18   Ht 6' (1.829 m)   Wt 235 lb 6.4 oz (106.8 kg)   SpO2 98%   BMI 31.93 kg/m²    CONST:  Well developed, obese  male who appears his stated age. Awake, alert, cooperative, no apparent distress  HEENT:   Head- Normocephalic, atraumatic   Eyes- Conjunctivae pink, anicteric  Throat- Oral mucosa pink and moist  Neck-  No stridor, trachea midline, no jugular venous distention. CHEST: Chest symmetrical and non-tender to palpation. RESPIRATORY: Lung sounds clear throughout fields bilaterally. No wheeze or rhonchi noted. CARDIOVASCULAR:     No carotid bruit noted bilaterally   Heart Ausculation- Regular rhythm, tachycardic no murmur  PV: No lower extremity edema. No varicosities. ABDOMEN: Soft, non-tender to light palpation. Bowel sounds present. Right sided JULIO drian with green colored fluid   MS: Good muscle strength and tone. No atrophy or abnormal movements. : Deferred   SKIN: Warm and dry no statis dermatitis or ulcers   NEURO / PSYCH: Oriented to person, place and time. Speech clear and appropriate. Follows all commands.  Pleasant affect        Intake/Output Summary (Last 24 hours) at 9/10/2020 1458  Last data filed at 9/10/2020 1221  Gross per 24 hour   Intake 245 ml   Output 380 ml   Net -135 ml       Weight:   Wt Readings from Last 3 Encounters:   09/10/20 235 lb 6.4 oz (106.8 kg) 08/11/20 239 lb 4 oz (108.5 kg)   05/28/20 (!) 315 lb (142.9 kg)     Current Inpatient Medications:   [START ON 9/11/2020] ceFAZolin  2 g Intravenous See Admin Instructions    metoprolol tartrate  12.5 mg Oral BID    insulin glargine  3 Units Subcutaneous Nightly    pantoprazole  40 mg Intravenous BID    And    sodium chloride (PF)  10 mL Intravenous BID    pregabalin  300 mg Oral BID    pravastatin  20 mg Oral Nightly    miconazole   Topical BID    hydroxyurea  500 mg Oral BID    fluticasone  2 spray Nasal Daily    fenofibrate  54 mg Oral Daily    DULoxetine  30 mg Oral Daily    cyclobenzaprine  10 mg Oral TID    sodium chloride flush  10 mL Intravenous 2 times per day    influenza virus vaccine  0.5 mL Intramuscular Prior to discharge    insulin lispro  0-6 Units Subcutaneous TID WC    insulin lispro  0-3 Units Subcutaneous Nightly    ferrous sulfate  325 mg Oral BID       DIAGNOSTIC/ LABORATORY DATA:  Labs:   CBC:   Recent Labs     09/09/20  0657 09/10/20  0640   WBC 14.8* 12.0*   HGB 8.8* 7.5*   HCT 28.2* 24.2*   PLT 1,283* 957*     HgA1c:   Lab Results   Component Value Date    LABA1C 6.3 (H) 08/01/2020     FASTING LIPID PANEL:  Lab Results   Component Value Date    CHOL 148 06/01/2020    HDL 35 06/01/2020    LDLCALC 63 06/01/2020    TRIG 252 06/01/2020     Telemetry: Sinus tachycardia      Echocardiogram 8/3/2020 (Alex):  Technically difficult study - limited visualization. Micro-bubble contrast injected to enhance left ventricular visualization.  Normal left ventricular size and systolic function.  Ejection fraction is visually estimated at 60-65%.   Normal diastolic function.  No regional wall motion abnormalities seen.   Mild left ventricular concentric hypertrophy noted.  Abnormal LV septal motion consistent with conduction disorder.  Normal right ventricular  size and function.  No significant valvular abnormalities.        Assessment:   1.  Sinus tachycardia probably due to abdominal pain and anemia. .    2. Acute blood loss anemia hemoglobin 8.4 on admission --> 8.8 -->7.5 today. Lovenox and aspirin currently on hold. 3. Fecal occult blood test positive status post endoscopy and colonoscopy with large friable nonbleeding mass and melena noted within the cecum -biopsies pending  4. Large coccyx wound  5. Leukocytosis, improving   6. Thrombocytosis, platelets 6885--> 952  7. Insulin requiring diabetes mellitus, A1c 6.3  8. Hypertension, controlled  9. Dyslipidemia, on statin therapy  10. H/o PE on CT Ab/Pelvis 7/2020 [facility])  -->vascular surgery consulted for IVC filter      Plan:  1. Increase Lopressor 25 mg twice daily and uptitrate as tolerated/needed. 2. Continue to hold Lovenox and Aspirin in the setting of acute anemia. 3. Plans for IVC filter per vascular surgery   4. Will sign off. May call if needed. Above discussed with Dr Elvis Alvarenga.    Electronically signed by Edy Hwang, [id number] on 9/10/2020 at 2:58 PM

## 2020-09-10 NOTE — PROGRESS NOTES
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 Pt s/p C-scope & EGD for eval of GIB. WIll provide ONS and monitor    Malnutrition Assessment:  Malnutrition Status:  Insufficient data    Context:  Acute Illness     Findings of the 6 clinical characteristics of malnutrition:  Energy Intake:  No significant decrease in energy intake  Weight Loss:  (20.2% wt loss x11 months)     Body Fat Loss:  Unable to assess(D/t PPE shortages)     Muscle Mass Loss:  Unable to assess    Fluid Accumulation:  No significant fluid accumulation     Strength:  Not Performed    Estimated Daily Nutrient Needs:  Energy (kcal):  ; Weight Used for Energy Requirements:  Current     Protein (g):  120-140; Weight Used for Protein Requirements:  Ideal(1.5-1.8)        Fluid (ml/day):  ; Weight Used for Fluid Requirements:  Current      Nutrition Related Findings:  pt alert, missing teeth, abd distention, active BS, adriano drain RUQ, no noted edema, +I/Os      Wounds:  Multiple, Open Wounds, Pressure Injury, Stage IV       Current Nutrition Therapies:    DIET GENERAL;  Dietary Nutrition Supplements: Low Calorie High Protein Supplement  Dietary Nutrition Supplements: Wound Healing Oral Supplement    Anthropometric Measures:  · Height: 6' (182.9 cm)  · Current Body Weight: 235 lb (106.6 kg)(bed scale 9/10)   · Admission Body Weight: 243 lb (110.2 kg)(standing scale 9/6)    · Usual Body Weight: 297 lb (134.7 kg)(actual per EMR 10/2019)     · Ideal Body Weight: 178 lbs; % Ideal Body Weight 132 %   · BMI: 31.9  · BMI Categories: Obese Class 1 (BMI 30.0-34. 9)       Nutrition Diagnosis:   · Increased nutrient needs related to increase demand for energy/nutrients as evidenced by wounds      Nutrition Interventions:   Food and/or Nutrient Delivery:  Continue Current Diet, Start Oral Nutrition Supplement(Ensure HP TID, Kraig BID)  Nutrition Education/Counseling:  Education not indicated   Coordination of Nutrition Care:  Continued Inpatient Monitoring    Goals:  Consume >75% meals/ONS       Nutrition Monitoring and Evaluation:   Food/Nutrient Intake Outcomes:  Supplement Intake, Food and Nutrient Intake  Physical Signs/Symptoms Outcomes:  Biochemical Data, GI Status, Fluid Status or Edema, Nutrition Focused Physical Findings, Skin, Weight     Discharge Planning:     Too soon to determine     Electronically signed by Jama Zapata MS, RD, LD on 9/10/20 at 11:48 AM EDT    Contact: [phone]

## 2020-09-10 NOTE — PROGRESS NOTES
Physical Therapy  Physical Therapy     Name: Aida Harden  : 1975  MRN: [id number]    Referring Provider: Bernadine Barba MD    Date of Service: 9/10/2020    Evaluating PT: Sage Mandujano PT, DPT, [id number]    Room #: 5316/2332-Z  Diagnosis: GI bleed  PMHx/PSHx: Displacement of lumbar intervertebral disc without myelopathy, chronic back pain, head injury, OA, HTN, fibromyalgia, DM, HLD, depression  Procedure/Surgery: EGD biopsy ()  Precautions: Fall risk, NWB R UE (per patient), R wrist drop, sling    SUBJECTIVE:    Pt was admitted from [place] [address]. Pt lives alone in an RV. 3 stairs and no rails to enter. Pt ambulated with [facility] Mod Independent prior to admission. OBJECTIVE:   Initial Evaluation  Date: 20 Treatment Date:  9/10 Short Term/ Long Term   Goals   AM-PAC 6 Clicks 83/55 61/08    Was pt agreeable to Eval/treatment? Yes yes    Does pt have pain? 9/10 chronic lower back pain; RN aware Chronic pain    Bed Mobility  Rolling: NT  Supine to sit: NT  Sit to supine: NT  Scooting: NT NT Rolling: Independent   Supine to sit: Independent   Sit to supine: Independent   Scooting: Independent    Transfers Sit to stand: Supervision  Stand to sit: Supervision  Stand pivot: SBA with IV pole Sit <> stand independent    Stand pivot sba Sit to stand: Independent   Stand to sit: Independent   Stand pivot: Mod Independent with SPC   Ambulation   200 feet with IV pole with  feet sp cane and right ue sling  sba 400 feet with SPC Mod Independent    Stair negotiation: ascended and descended NT Declined  3 step(s) with 0 rail(s) with SPC Mod Independent    ROM BUE: Refer to OT note  BLE: WFL     Strength BUE: Refer to OT note  BLE: WFL     Balance Sitting EOB: Independent   Dynamic Standing: SBA with IV pole Sitting eob  Independent  Dynamic standing sba/s Sitting EOB: Independent   Dynamic Standing:  Mod Independent with SPC     Pt is A & O x:3  Patient education  Pt educated on safety    Patient response to education:   Pt verbalized understanding Pt demonstrated skill Pt requires further education in this area   Yes yes No     ASSESSMENT:    Comments:   Pt was in bathroom upon arrival.   Pt came out of the bathroom and donned his sling got the cane and ambulated with no lob but going a little too fast.   Discussed overall safety with pt and pt  Left sitting on eob. Treatment:  Patient practiced and was instructed in the following treatment:     Therapeutic activities: Pt completed all therapeutic activities noted above. Pt was cued for safety when ambulating with cane .  Pt educated on safety     Pt's/family goals:   1. To return to [facility] [place]. Patient and or family understand(s) diagnosis, prognosis, and plan of care. Yes. PLAN:    PT care will be provided in accordance with the objectives noted above. Exercises and functional mobility practice will be used as well as appropriate assistive devices or modalities to obtain goals. Patient and family education will also be administered as needed. Frequency of treatments: 2-5x/week x 1-2 days. Time in: 145  Time out: 153     Total Treatment Time 8    Evaluation Time includes thorough review of current medical information, gathering information on past medical history/social history and prior level of function, completion of standardized testing/informal observation of tasks, assessment of data and education on plan of care and goals.     CPT codes:  [] Low Complexity PT evaluation 64564  [] Moderate Complexity PT evaluation 48004  [] High Complexity PT evaluation 72204  [] PT Re-evaluation 99168  [] Gait training 91781 0 minutes  [] Manual therapy 74094 0 minutes  [x] Therapeutic activities 90039 8 minutes  [] Therapeutic exercises 37484 0 minutes  [] Neuromuscular reeducation 87185 0 minutes     Bg Smith, PTA  [id number]

## 2020-09-10 NOTE — PLAN OF CARE
Problem: Falls - Risk of:  Goal: Will remain free from falls  Description: Will remain free from falls  9/10/2020 0101 by Demetris Teague RN  Outcome: Met This Shift  9/9/2020 1827 by Sharda Rothman RN  Outcome: Met This Shift  Goal: Absence of physical injury  Description: Absence of physical injury  9/10/2020 0101 by Demetris Teague RN  Outcome: Met This Shift  9/9/2020 1827 by Sharda Rothman RN  Outcome: Met This Shift     Problem: Pain:  Goal: Pain level will decrease  Description: Pain level will decrease  9/10/2020 0101 by Demetris Teague RN  Outcome: Met This Shift  9/9/2020 1827 by Sharda Rothman RN  Outcome: Met This Shift  Goal: Control of acute pain  Description: Control of acute pain  9/10/2020 0101 by Demetris Teague RN  Outcome: Met This Shift  9/9/2020 1827 by Sharda Rothman RN  Outcome: Met This Shift  Goal: Control of chronic pain  Description: Control of chronic pain  9/10/2020 0101 by Demetris Teague RN  Outcome: Met This Shift  9/9/2020 1827 by Sharda Rothman RN  Outcome: Met This Shift     Problem: Skin Integrity:  Goal: Will show no infection signs and symptoms  Description: Will show no infection signs and symptoms  9/10/2020 0101 by Demetris Teague RN  Outcome: Met This Shift  9/9/2020 1827 by Sharda Rothman RN  Outcome: Met This Shift  Goal: Absence of new skin breakdown  Description: Absence of new skin breakdown  9/10/2020 0101 by Demetris Teague RN  Outcome: Met This Shift  9/9/2020 1827 by Sharda Rothman RN  Outcome: Met This Shift

## 2020-09-11 VITALS
TEMPERATURE: 97.4 F | DIASTOLIC BLOOD PRESSURE: 61 MMHG | SYSTOLIC BLOOD PRESSURE: 90 MMHG | HEART RATE: 70 BPM | RESPIRATION RATE: 18 BRPM | HEIGHT: 72 IN | BODY MASS INDEX: 31.77 KG/M2 | WEIGHT: 234.6 LBS | OXYGEN SATURATION: 100 %

## 2020-09-11 LAB
HCT VFR BLD CALC: 23.4 % (ref 37–54)
HEMOGLOBIN: 7.2 G/DL (ref 12.5–16.5)
MCH RBC QN AUTO: 27.3 PG (ref 26–35)
MCHC RBC AUTO-ENTMCNC: 30.8 % (ref 32–34.5)
MCV RBC AUTO: 88.6 FL (ref 80–99.9)
METER GLUCOSE: 111 MG/DL (ref 74–99)
METER GLUCOSE: 114 MG/DL (ref 74–99)
PDW BLD-RTO: 18.5 FL (ref 11.5–15)
PLATELET # BLD: 913 E9/L (ref 130–450)
PMV BLD AUTO: 8.1 FL (ref 7–12)
RBC # BLD: 2.64 E12/L (ref 3.8–5.8)
WBC # BLD: 5.8 E9/L (ref 4.5–11.5)

## 2020-09-11 PROCEDURE — 37191 INS ENDOVAS VENA CAVA FILTR: CPT | Performed by: SURGERY

## 2020-09-11 PROCEDURE — 06H03DZ INSERTION OF INTRALUMINAL DEVICE INTO INFERIOR VENA CAVA, PERCUTANEOUS APPROACH: ICD-10-PCS | Performed by: SURGERY

## 2020-09-11 PROCEDURE — 6360000002 HC RX W HCPCS: Performed by: STUDENT IN AN ORGANIZED HEALTH CARE EDUCATION/TRAINING PROGRAM

## 2020-09-11 PROCEDURE — C1769 GUIDE WIRE: HCPCS

## 2020-09-11 PROCEDURE — 2580000003 HC RX 258: Performed by: STUDENT IN AN ORGANIZED HEALTH CARE EDUCATION/TRAINING PROGRAM

## 2020-09-11 PROCEDURE — 99223 1ST HOSP IP/OBS HIGH 75: CPT | Performed by: SURGERY

## 2020-09-11 PROCEDURE — 6370000000 HC RX 637 (ALT 250 FOR IP): Performed by: STUDENT IN AN ORGANIZED HEALTH CARE EDUCATION/TRAINING PROGRAM

## 2020-09-11 PROCEDURE — 82962 GLUCOSE BLOOD TEST: CPT

## 2020-09-11 PROCEDURE — C9113 INJ PANTOPRAZOLE SODIUM, VIA: HCPCS | Performed by: STUDENT IN AN ORGANIZED HEALTH CARE EDUCATION/TRAINING PROGRAM

## 2020-09-11 PROCEDURE — 2500000003 HC RX 250 WO HCPCS

## 2020-09-11 PROCEDURE — 85027 COMPLETE CBC AUTOMATED: CPT

## 2020-09-11 PROCEDURE — C1880 VENA CAVA FILTER: HCPCS

## 2020-09-11 PROCEDURE — 36415 COLL VENOUS BLD VENIPUNCTURE: CPT

## 2020-09-11 PROCEDURE — 6370000000 HC RX 637 (ALT 250 FOR IP): Performed by: HOSPITALIST

## 2020-09-11 PROCEDURE — 6360000002 HC RX W HCPCS

## 2020-09-11 PROCEDURE — 2709999900 HC NON-CHARGEABLE SUPPLY

## 2020-09-11 PROCEDURE — 6370000000 HC RX 637 (ALT 250 FOR IP): Performed by: PHYSICIAN ASSISTANT

## 2020-09-11 RX ORDER — OXYCODONE HYDROCHLORIDE AND ACETAMINOPHEN 5; 325 MG/1; MG/1
1 TABLET ORAL EVERY 6 HOURS PRN
Qty: 10 TABLET | Refills: 0 | Status: SHIPPED | OUTPATIENT
Start: 2020-09-11 | End: 2020-09-14

## 2020-09-11 RX ADMIN — METOPROLOL TARTRATE 25 MG: 25 TABLET, FILM COATED ORAL at 11:23

## 2020-09-11 RX ADMIN — CYCLOBENZAPRINE 10 MG: 10 TABLET, FILM COATED ORAL at 11:22

## 2020-09-11 RX ADMIN — OXYCODONE AND ACETAMINOPHEN 2 TABLET: 5; 325 TABLET ORAL at 11:22

## 2020-09-11 RX ADMIN — HYDROXYUREA 500 MG: 500 CAPSULE ORAL at 11:23

## 2020-09-11 RX ADMIN — OXYCODONE AND ACETAMINOPHEN 2 TABLET: 5; 325 TABLET ORAL at 02:28

## 2020-09-11 RX ADMIN — OXYCODONE AND ACETAMINOPHEN 2 TABLET: 5; 325 TABLET ORAL at 15:48

## 2020-09-11 RX ADMIN — SODIUM CHLORIDE, PRESERVATIVE FREE 10 ML: 5 INJECTION INTRAVENOUS at 11:18

## 2020-09-11 RX ADMIN — PREGABALIN 300 MG: 150 CAPSULE ORAL at 11:23

## 2020-09-11 RX ADMIN — FENOFIBRATE 54 MG: 54 TABLET ORAL at 11:22

## 2020-09-11 RX ADMIN — DULOXETINE HYDROCHLORIDE 30 MG: 30 CAPSULE, DELAYED RELEASE ORAL at 11:22

## 2020-09-11 RX ADMIN — FERROUS SULFATE TAB 325 MG (65 MG ELEMENTAL FE) 325 MG: 325 (65 FE) TAB at 11:22

## 2020-09-11 RX ADMIN — PANTOPRAZOLE SODIUM 40 MG: 40 INJECTION, POWDER, FOR SOLUTION INTRAVENOUS at 11:18

## 2020-09-11 RX ADMIN — FLUTICASONE PROPIONATE 2 SPRAY: 50 SPRAY, METERED NASAL at 11:18

## 2020-09-11 RX ADMIN — OXYCODONE AND ACETAMINOPHEN 2 TABLET: 5; 325 TABLET ORAL at 06:25

## 2020-09-11 ASSESSMENT — PAIN SCALES - GENERAL
PAINLEVEL_OUTOF10: 9
PAINLEVEL_OUTOF10: 7
PAINLEVEL_OUTOF10: 9
PAINLEVEL_OUTOF10: 10

## 2020-09-11 ASSESSMENT — PAIN DESCRIPTION - PAIN TYPE: TYPE: ACUTE PAIN;CHRONIC PAIN

## 2020-09-11 ASSESSMENT — PAIN DESCRIPTION - LOCATION: LOCATION: ABDOMEN;BACK

## 2020-09-11 NOTE — PROGRESS NOTES
PROGRESS NOTE:    Patient seen and examined  Chart reviewed  OR today for IVCF    Electronically signed by Abimael Taylor MD on 9/11/2020 at 6:21 AM

## 2020-09-11 NOTE — PROGRESS NOTES
oxyCODONE-acetaminophen **OR** oxyCODONE-acetaminophen, morphine, melatonin, sodium chloride flush, acetaminophen **OR** acetaminophen, polyethylene glycol, promethazine **OR** ondansetron, glucose, dextrose, glucagon (rDNA), dextrose      Intake/Output Summary (Last 24 hours) at 9/11/2020 1019  Last data filed at 9/10/2020 2100  Gross per 24 hour   Intake 960 ml   Output 330 ml   Net 630 ml       Exam:    /72   Pulse 90   Temp 97.4 °F (36.3 °C) (Temporal)   Resp 18   Ht 6' (1.829 m)   Wt 234 lb 9.6 oz (106.4 kg)   SpO2 100%   BMI 31.82 kg/m²     General appearance: Lying comfortably in bed. No apparent distress. Respiratory: Clear to auscultation bilaterally. Cardiovascular: Normal S1/S2. Regular rhythm, tachycardic. Abdomen: Soft, non-tender, non-distended with normal bowel sounds. Cholecystectomy tube in place  Musculoskeletal: No clubbing, cyanosis or edema bilaterally. Skin: Sacral decubitus ulcer   Neurologic:  No focal deficit. Psychiatric: Alert and oriented, thought content appropriate, normal insight  Peripheral Pulses: +2 palpable, equal bilaterally       Labs:   Recent Labs     09/09/20  0657 09/10/20  0640 09/11/20  0435   WBC 14.8* 12.0* 5.8   HGB 8.8* 7.5* 7.2*   HCT 28.2* 24.2* 23.4*   PLT 1,283* 957* 913*     No results for input(s): NA, K, CL, CO2, BUN, CREATININE, CALCIUM, PHOS in the last 72 hours. Invalid input(s): MAGNES  No results for input(s): AST, ALT, BILIDIR, BILITOT, ALKPHOS in the last 72 hours. No results for input(s): INR in the last 72 hours. No results for input(s): Laverda Dancer in the last 72 hours. Radiology:  NM GI BLOOD LOSS   Final Result   No abnormal tracer identified to suggest GI bleed.                                         Active Hospital Problems    Diagnosis Date Noted    Acute GI bleeding [K92.2] 09/03/2020    Acute blood loss anemia [D62]     Thrombocytosis (HCC) [D47.3] 08/10/2020    Other pulmonary embolism without acute cor pulmonale ([facility] [I26.99] 08/01/2020    Type 2 diabetes mellitus ([facility] [E11.9] 04/08/2016    Class 1 obesity in adult [E66.9] 12/17/2015    Essential hypertension [I10] 10/16/2015    Neuropathic pain [M79.2] 05/08/2012       Assessment  1. GI bleeding - status post EGD on 9/4 which only showed minimal gastritis. Colonscopy on 09/05 showed Cecal mass and melena at Cecum and throughout the colon. Bleeding scan showed no abnormal tracer identified to suggest GI bleed  2. Acute blood loss anemia - likely secondary to above superimposed on baseline anemia of chronic disease  3. Hypotension - suspect due to secondary to hypovolemia, draining cholecystostomy tube likely contributory. Improved BP with IV hydration, holding lisinopril  4. Sinus Tachycardia - etiology unclear; HR at 120 bpm but patient denies any palpitation. 5. Type II DM with long-term insulin use and with complication - rate controlled  6. Diabetic neuropathy  7. Hyperlipidemia, mixed  8. Chronic anticoagulation - on apixaban for #s 9&10; currently on hold for #1.   9. History of pulmonary embolism  10. Thrombocytosis - on Hydroxyurea. 11. Hx of  Splenic thrombosis   12. Hx of splenic infarction - This was likely to be due to splenic thrombosis  13. Sacral decubitus wound, stage II/III (POA)   14. Recent acute cholecystitis - currently patient has cholecystomy tube in place  15. Obesity - Body mass index is 31.82 kg/m².    16. Osteoarthritis of hip joint - status post replacement        Plan:  Appreciate cardiologist's consult, Lopressor increased to 25 mg BID  Vascular surgery consult for IVC filter in light of history of PE, now with relative contraindication to oral anticoagulation  Continue to monitor H&H, transfuse PRBC to keep hemoglobin above 7 g/dL  On PPI  Awaiting pathology report from cecal mass biopsy  Holding ASA, apixaban  PRN analgesia  Basal and corrective bolus insulin regimen  Discharge planning for home SNF likely after IVC filter placement    DVT Prophylaxis: SCDs, no VTE chemoprophylaxis in light of suspected GI bleed  Diet: Diet NPO, After Midnight Exceptions are: Sips with Meds  Code Status: Full Code    PT/OT Eval Status: Ongoing    Dispo - SNF once stable    Maxwell Naik MD 9/11/2020 10:19 AM

## 2020-09-11 NOTE — PROGRESS NOTES
Message sent to surgery regarding if the patient is stable for discharge from there point of view. Also asked when anticoagulation can be resumed.  Awaiting response      Per surgery patient is okay for discharge and hold coags until he follows up in the office outpatient

## 2020-09-11 NOTE — PROGRESS NOTES
Message sent to vascular regarding if patient is okay for discharge.  Awaiting response    Per vascular, patient is okay for discharge

## 2020-09-11 NOTE — PLAN OF CARE
Problem: Falls - Risk of:  Goal: Will remain free from falls  Description: Will remain free from falls  Outcome: Met This Shift  Goal: Absence of physical injury  Description: Absence of physical injury  Outcome: Met This Shift     Problem: Pain:  Description: Pain management should include both nonpharmacologic and pharmacologic interventions. Goal: Pain level will decrease  Description: Pain level will decrease  Outcome: Met This Shift  Goal: Control of acute pain  Description: Control of acute pain  Outcome: Met This Shift  Goal: Control of chronic pain  Description: Control of chronic pain  Outcome: Met This Shift     Problem: Skin Integrity:  Goal: Will show no infection signs and symptoms  Description: Will show no infection signs and symptoms  Outcome: Met This Shift  Goal: Absence of new skin breakdown  Description: Absence of new skin breakdown  Outcome: Met This Shift     Problem: Increased nutrient needs (NI-5.1)  Description: Increased need for a specific nutrient compared to established reference standards or recommendations based on physiological needs. Etiology:   Signs/Symptoms:   Goal: Food and/or Nutrient Delivery  Description: Individualized approach for food/nutrient provision.   9/10/2020 1147 by Jd Johnson, MS, RD, LD  Outcome: Met This Shift

## 2020-09-11 NOTE — PLAN OF CARE
Problem: Falls - Risk of:  Goal: Will remain free from falls  Description: Will remain free from falls  9/11/2020 0356 by Mirella Ernst RN  Outcome: Met This Shift  9/10/2020 2242 by Radha Lemos RN  Outcome: Met This Shift  Goal: Absence of physical injury  Description: Absence of physical injury  9/11/2020 0356 by Mirella Ernst RN  Outcome: Met This Shift  9/10/2020 2242 by Radha Lemos RN  Outcome: Met This Shift     Problem: Pain:  Goal: Pain level will decrease  Description: Pain level will decrease  9/11/2020 0356 by Mirella Ernst RN  Outcome: Met This Shift  9/10/2020 2242 by Radha Lemos RN  Outcome: Met This Shift  Goal: Control of acute pain  Description: Control of acute pain  9/11/2020 0356 by Mirella Ernst RN  Outcome: Met This Shift  9/10/2020 2242 by Radha Lemos RN  Outcome: Met This Shift  Goal: Control of chronic pain  Description: Control of chronic pain  9/11/2020 0356 by Mirella Ernst RN  Outcome: Met This Shift  9/10/2020 2242 by Radha Lemos RN  Outcome: Met This Shift     Problem: Skin Integrity:  Goal: Will show no infection signs and symptoms  Description: Will show no infection signs and symptoms  9/10/2020 2242 by Radha Lemos RN  Outcome: Met This Shift  Goal: Absence of new skin breakdown  Description: Absence of new skin breakdown  9/10/2020 2242 by Radha Lemos RN  Outcome: Met This Shift

## 2020-09-11 NOTE — PROGRESS NOTES
Message sent to Dr. Delatorre  him that surgery and vascular is okay with the patient being discharged. Per surgery hold coags until follow up with surgery.

## 2020-09-11 NOTE — DISCHARGE SUMMARY
Hospital Medicine Discharge Summary    Patient ID: Elisabet Vu      Patient's PCP: Milli Robledo, APRN - CNP    Admit Date: 9/3/2020     Discharge Date:   9/11/2020     Admitting Physician: Deann Stuart MD     Discharge Physician: Mai Castaneda MD     Discharge Diagnoses: Active Hospital Problems    Diagnosis Date Noted    Acute GI bleeding [K92.2] 09/03/2020    Acute blood loss anemia [D62]     Thrombocytosis (HCC) [D47.3] 08/10/2020    Other pulmonary embolism without acute cor pulmonale (HCC) [I26.99] 08/01/2020    Type 2 diabetes mellitus ([facility] [address] [E11.9] 04/08/2016    Class 1 obesity in adult [E66.9] 12/17/2015    Essential hypertension [I10] 10/16/2015    Neuropathic pain [M79.2] 05/08/2012     1. GI bleeding   2. Acute blood loss anemia  3. Hypotension   4. Sinus Tachycardia   5. Type II DM with long-term insulin use and with complication   6. Diabetic neuropathy  7. Hyperlipidemia, mixed  8. Chronic anticoagulation   9. History of pulmonary embolism  10. Thrombocytosis   11. Hx of  Splenic thrombosis   12. Hx of splenic infarction   13. Sacral decubitus wound, stage II/III (POA)   14. Recent acute cholecystitis   15. Obesity - Body mass index is 31.82 kg/m². 16. Osteoarthritis of hip joint - status post replacement      The patient was seen and examined on day of discharge and this discharge summary is in conjunction with any daily progress note from day of discharge. Hospital Course: This is a 13-year-old male with past medical history of diabetes mellitus, hypertension, chronic back pain who presented to the emergency department for abnormal labs. He had outpatient labs a revealed hemoglobin at 8.4 g/dL, was 10.2 g/dL about a month prior. Admits to melanotic stool, progressive weakness.   He was evaluated by general surgeon and underwent EGD that showed mild gastritis, subsequent colonoscopy revealed cecal lesion with multiple friable polyps, difficulty to delineate if 1 large mass versus multiple polyps; did have evidence of dark blood in the cecum. Surgical pathology showed colonic mucosa with diminutive benign appearing intramucosal lymphoid aggregate, no significant pathologic abnormality identified Bleeding scan was recommended, which showed no abnormal tracer identified to suggest GI bleed; antiplatelet therapy and apixaban have been held. Patient was evaluated by cardiologist for persistent sinus tachycardia though he had been asymptomatic; he was started on Lopressor as recommended. An IVC filter was implanted due to his history of pulmonary embolism and relative contraindication for anticoagulation in light of his GI bleed. Patient was discharged to SNF in fair condition; he is to continue with the cholecystomy tube in place and follow up with general surgeon in outpatient setting for further management. Exam:     /72   Pulse 90   Temp 97.4 °F (36.3 °C) (Temporal)   Resp 18   Ht 6' (1.829 m)   Wt 234 lb 9.6 oz (106.4 kg)   SpO2 100%   BMI 31.82 kg/m²     General appearance: Sitting comfortably in bed. No apparent distress. Respiratory: Clear to auscultation bilaterally. Cardiovascular: Normal S1/S2. Regular rhythm, tachycardic. Abdomen: Soft, non-tender, non-distended with normal bowel sounds. Cholecystectomy tube in place  Musculoskeletal: No clubbing, cyanosis or edema bilaterally. Skin: Sacral decubitus ulcer   Neurologic:  No focal deficit.   Psychiatric: Alert and oriented, thought content appropriate, normal insight  Peripheral Pulses: +2 palpable, equal bilaterally       Consults:     IP CONSULT TO GENERAL SURGERY  IP CONSULT TO INTERNAL MEDICINE  IP CONSULT TO SOCIAL WORK  IP CONSULT TO DIETITIAN  IP CONSULT TO CARDIOLOGY  IP CONSULT TO VASCULAR SURGERY      Disposition:   SNF    Condition at Discharge: Stable    Discharge Instructions/Follow-up:  General surgeon     Code Status:  Full Code     Activity: activity as tolerated    Diet: insulin lispro (HUMALOG) 100 UNIT/ML injection vial Inject into the skin 3 times daily (before meals) *Plus Sliding Scale*      pantoprazole (PROTONIX) 40 MG tablet Take 40 mg by mouth daily      DULoxetine (CYMBALTA) 30 MG extended release capsule Take 1 capsule by mouth daily  Qty: 90 capsule, Refills: 1    Associated Diagnoses: Neuropathic pain; Lumbar radiculopathy      pravastatin (PRAVACHOL) 20 MG tablet Take 1 tablet by mouth nightly  Qty: 90 tablet, Refills: 1    Associated Diagnoses: Mixed hyperlipidemia         STOP taking these medications       aspirin 81 MG chewable tablet Comments:   Reason for Stopping:         ibuprofen (ADVIL;MOTRIN) 600 MG tablet Comments:   Reason for Stopping:         lisinopril (PRINIVIL;ZESTRIL) 20 MG tablet Comments:   Reason for Stopping:         aspirin 81 MG EC tablet Comments:   Reason for Stopping:         apixaban (ELIQUIS) 5 MG TABS tablet Comments:   Reason for Stopping:         enoxaparin (LOVENOX) 120 MG/0.8ML injection Comments:   Reason for Stopping:         insulin lispro (HUMALOG) 100 UNIT/ML injection cartridge Comments:   Reason for Stopping:         insulin glargine (LANTUS) 100 UNIT/ML injection vial Comments:   Reason for Stopping:         loperamide (LOPERAMIDE A-D) 2 MG tablet Comments:   Reason for Stopping:         Melatonin-Pyridoxine (MELATIN) 3-1 MG TABS Comments:   Reason for Stopping:         nystatin (MYCOSTATIN) 802198 UNIT/GM cream Comments:   Reason for Stopping:         Cholecalciferol (VITAMIN D3) 1.25 MG (89077 UT) CAPS Comments:   Reason for Stopping:         Continuous Blood Gluc Sensor (FREESTYLE JOANIE SENSOR SYSTEM) MISC Comments:   Reason for Stopping:         aspirin (SM ASPIRIN ADULT LOW STRENGTH) 81 MG EC tablet Comments:   Reason for Stopping:         lisinopril (PRINIVIL;ZESTRIL) 20 MG tablet Comments:   Reason for Stopping:         ibuprofen (ADVIL;MOTRIN) 600 MG tablet Comments:   Reason for Stopping:               Time Spent on discharge is more than 45 minutes in the examination, evaluation, counseling and review of medications and discharge plan. Signed:    Sergio Miller MD   9/11/2020      Thank you IGRO Bullock CNP for the opportunity to be involved in this patient's care. If you have any questions or concerns please feel free to contact me at [phone].

## 2020-09-11 NOTE — OP NOTE
DATE OF OPERATION:    9/11/2020    PREOPERATIVE DIAGNOSIS:    Acute other pulmonary embolism without cor pulmonale. POSTOPERATIVE DIAGNOSIS:    Same    SURGEON:    Tu Marcum M.D.    ASSISTANT(S):        ANESTHESIA:    LMAC    OPERATION:    Insertion of inferior vena cava filter (12477)    ESTIMATED BLOOD LOSS:    Minimal    COMPLICATIONS:    None    DESCRIPTION OF OPERATION:    The patient was identified and the procedure confirmed. The groin regions were prepped and draped in the usual sterile fashion. Lidocaine 1% mixed with marcaine 0.25% were used for local anesthesia. The right common femoral vein was percutaneously entered and a wire was advanced into the inferior vena cava under fluoroscopic guidance. A small incision was made around the wire. The dilator was passed over the wire followed by the introducer, which was advanced into the inferior vena cava. The filter Jessie Elan) was advanced through the introducer then positioned and deployed at the level of the 2nd vertebra. The filter deployed properly. The introducer was removed and a Monocryl suture was placed around the puncture site for hemostasis. A sterile pressure bandage was applied to the puncture site in the operating room. Needle, sponge, and instrument counts were reported as correct. The patient tolerated the procedure and was transferred to the recovery area in satisfactory condition.

## 2020-09-14 ENCOUNTER — TELEPHONE (OUTPATIENT)
Dept: CARDIOLOGY CLINIC | Age: 45
End: 2020-09-14

## 2020-09-15 ENCOUNTER — TELEPHONE (OUTPATIENT)
Dept: SURGERY | Age: 45
End: 2020-09-15

## 2020-09-17 ENCOUNTER — HOSPITAL ENCOUNTER (INPATIENT)
Age: 45
LOS: 9 days | Discharge: NURSING, REHAB OR LONG-TERM CARE | DRG: 710 | End: 2020-09-26
Attending: EMERGENCY MEDICINE | Admitting: INTERNAL MEDICINE
Payer: COMMERCIAL

## 2020-09-17 ENCOUNTER — APPOINTMENT (OUTPATIENT)
Dept: CT IMAGING | Age: 45
DRG: 710 | End: 2020-09-17
Payer: COMMERCIAL

## 2020-09-17 ENCOUNTER — APPOINTMENT (OUTPATIENT)
Dept: GENERAL RADIOLOGY | Age: 45
DRG: 710 | End: 2020-09-17
Payer: COMMERCIAL

## 2020-09-17 PROBLEM — A41.9 SEPSIS (HCC): Status: ACTIVE | Noted: 2020-09-17

## 2020-09-17 PROBLEM — N17.9 AKI (ACUTE KIDNEY INJURY) (HCC): Status: ACTIVE | Noted: 2020-09-17

## 2020-09-17 PROBLEM — B99.9 INTRA-ABDOMINAL INFECTION: Status: ACTIVE | Noted: 2020-09-17

## 2020-09-17 LAB
ABO/RH: NORMAL
ALBUMIN SERPL-MCNC: 3.4 G/DL (ref 3.5–5.2)
ALP BLD-CCNC: 143 U/L (ref 40–129)
ALT SERPL-CCNC: 13 U/L (ref 0–40)
ANION GAP SERPL CALCULATED.3IONS-SCNC: 17 MMOL/L (ref 7–16)
ANISOCYTOSIS: ABNORMAL
ANTIBODY SCREEN: NORMAL
APTT: 33.5 SEC (ref 24.5–35.1)
AST SERPL-CCNC: 15 U/L (ref 0–39)
BASOPHILS ABSOLUTE: 0.03 E9/L (ref 0–0.2)
BASOPHILS RELATIVE PERCENT: 0.2 % (ref 0–2)
BILIRUB SERPL-MCNC: <0.2 MG/DL (ref 0–1.2)
BILIRUBIN DIRECT: <0.2 MG/DL (ref 0–0.3)
BILIRUBIN URINE: NEGATIVE
BILIRUBIN, INDIRECT: ABNORMAL MG/DL (ref 0–1)
BLOOD, URINE: NEGATIVE
BUN BLDV-MCNC: 17 MG/DL (ref 6–20)
BURR CELLS: ABNORMAL
CALCIUM SERPL-MCNC: 9.5 MG/DL (ref 8.6–10.2)
CHLORIDE BLD-SCNC: 98 MMOL/L (ref 98–107)
CLARITY: CLEAR
CO2: 21 MMOL/L (ref 22–29)
COLOR: YELLOW
CREAT SERPL-MCNC: 1.5 MG/DL (ref 0.7–1.2)
EKG ATRIAL RATE: 131 BPM
EKG P AXIS: 58 DEGREES
EKG P-R INTERVAL: 144 MS
EKG Q-T INTERVAL: 286 MS
EKG QRS DURATION: 88 MS
EKG QTC CALCULATION (BAZETT): 422 MS
EKG R AXIS: 46 DEGREES
EKG T AXIS: 59 DEGREES
EKG VENTRICULAR RATE: 131 BPM
EOSINOPHILS ABSOLUTE: 0.18 E9/L (ref 0.05–0.5)
EOSINOPHILS RELATIVE PERCENT: 1 % (ref 0–6)
GFR AFRICAN AMERICAN: >60
GFR NON-AFRICAN AMERICAN: 50 ML/MIN/1.73
GLUCOSE BLD-MCNC: 113 MG/DL (ref 74–99)
GLUCOSE URINE: NEGATIVE MG/DL
HCT VFR BLD CALC: 32.3 % (ref 37–54)
HEMOGLOBIN: 10 G/DL (ref 12.5–16.5)
HYPOCHROMIA: ABNORMAL
IMMATURE GRANULOCYTES #: 0.1 E9/L
IMMATURE GRANULOCYTES %: 0.6 % (ref 0–5)
INR BLD: 1.2
KETONES, URINE: NEGATIVE MG/DL
LACTIC ACID: 1 MMOL/L (ref 0.5–2.2)
LACTIC ACID: 4.2 MMOL/L (ref 0.5–2.2)
LEUKOCYTE ESTERASE, URINE: NEGATIVE
LIPASE: 6 U/L (ref 13–60)
LYMPHOCYTES ABSOLUTE: 1.69 E9/L (ref 1.5–4)
LYMPHOCYTES RELATIVE PERCENT: 9.8 % (ref 20–42)
MCH RBC QN AUTO: 27.5 PG (ref 26–35)
MCHC RBC AUTO-ENTMCNC: 31 % (ref 32–34.5)
MCV RBC AUTO: 88.7 FL (ref 80–99.9)
MONOCYTES ABSOLUTE: 1.62 E9/L (ref 0.1–0.95)
MONOCYTES RELATIVE PERCENT: 9.4 % (ref 2–12)
NEUTROPHILS ABSOLUTE: 13.66 E9/L (ref 1.8–7.3)
NEUTROPHILS RELATIVE PERCENT: 79 % (ref 43–80)
NITRITE, URINE: NEGATIVE
PDW BLD-RTO: 18.6 FL (ref 11.5–15)
PH UA: 6 (ref 5–9)
PLATELET # BLD: 588 E9/L (ref 130–450)
PMV BLD AUTO: 8.5 FL (ref 7–12)
POIKILOCYTES: ABNORMAL
POLYCHROMASIA: ABNORMAL
POTASSIUM REFLEX MAGNESIUM: 4.5 MMOL/L (ref 3.5–5)
PROTEIN UA: NEGATIVE MG/DL
PROTHROMBIN TIME: 13.9 SEC (ref 9.3–12.4)
RBC # BLD: 3.64 E12/L (ref 3.8–5.8)
SCHISTOCYTES: ABNORMAL
SODIUM BLD-SCNC: 136 MMOL/L (ref 132–146)
SPECIFIC GRAVITY UA: 1.01 (ref 1–1.03)
TARGET CELLS: ABNORMAL
TOTAL PROTEIN: 6.9 G/DL (ref 6.4–8.3)
TROPONIN: 0.03 NG/ML (ref 0–0.03)
UROBILINOGEN, URINE: 0.2 E.U./DL
WBC # BLD: 17.3 E9/L (ref 4.5–11.5)

## 2020-09-17 PROCEDURE — 2580000003 HC RX 258: Performed by: RADIOLOGY

## 2020-09-17 PROCEDURE — 96365 THER/PROPH/DIAG IV INF INIT: CPT

## 2020-09-17 PROCEDURE — 2580000003 HC RX 258: Performed by: STUDENT IN AN ORGANIZED HEALTH CARE EDUCATION/TRAINING PROGRAM

## 2020-09-17 PROCEDURE — 99284 EMERGENCY DEPT VISIT MOD MDM: CPT

## 2020-09-17 PROCEDURE — 86901 BLOOD TYPING SEROLOGIC RH(D): CPT

## 2020-09-17 PROCEDURE — 87081 CULTURE SCREEN ONLY: CPT

## 2020-09-17 PROCEDURE — 6360000002 HC RX W HCPCS: Performed by: STUDENT IN AN ORGANIZED HEALTH CARE EDUCATION/TRAINING PROGRAM

## 2020-09-17 PROCEDURE — 86850 RBC ANTIBODY SCREEN: CPT

## 2020-09-17 PROCEDURE — 86900 BLOOD TYPING SEROLOGIC ABO: CPT

## 2020-09-17 PROCEDURE — 99254 IP/OBS CNSLTJ NEW/EST MOD 60: CPT | Performed by: SURGERY

## 2020-09-17 PROCEDURE — 85730 THROMBOPLASTIN TIME PARTIAL: CPT

## 2020-09-17 PROCEDURE — 36556 INSERT NON-TUNNEL CV CATH: CPT

## 2020-09-17 PROCEDURE — 71045 X-RAY EXAM CHEST 1 VIEW: CPT

## 2020-09-17 PROCEDURE — 87040 BLOOD CULTURE FOR BACTERIA: CPT

## 2020-09-17 PROCEDURE — 71275 CT ANGIOGRAPHY CHEST: CPT

## 2020-09-17 PROCEDURE — 86923 COMPATIBILITY TEST ELECTRIC: CPT

## 2020-09-17 PROCEDURE — 2000000000 HC ICU R&B

## 2020-09-17 PROCEDURE — 81003 URINALYSIS AUTO W/O SCOPE: CPT

## 2020-09-17 PROCEDURE — 84484 ASSAY OF TROPONIN QUANT: CPT

## 2020-09-17 PROCEDURE — 93005 ELECTROCARDIOGRAM TRACING: CPT | Performed by: STUDENT IN AN ORGANIZED HEALTH CARE EDUCATION/TRAINING PROGRAM

## 2020-09-17 PROCEDURE — P9016 RBC LEUKOCYTES REDUCED: HCPCS

## 2020-09-17 PROCEDURE — 99285 EMERGENCY DEPT VISIT HI MDM: CPT

## 2020-09-17 PROCEDURE — 36620 INSERTION CATHETER ARTERY: CPT

## 2020-09-17 PROCEDURE — 96361 HYDRATE IV INFUSION ADD-ON: CPT

## 2020-09-17 PROCEDURE — 85610 PROTHROMBIN TIME: CPT

## 2020-09-17 PROCEDURE — 02HV33Z INSERTION OF INFUSION DEVICE INTO SUPERIOR VENA CAVA, PERCUTANEOUS APPROACH: ICD-10-PCS | Performed by: STUDENT IN AN ORGANIZED HEALTH CARE EDUCATION/TRAINING PROGRAM

## 2020-09-17 PROCEDURE — 6360000002 HC RX W HCPCS: Performed by: EMERGENCY MEDICINE

## 2020-09-17 PROCEDURE — 6360000004 HC RX CONTRAST MEDICATION: Performed by: RADIOLOGY

## 2020-09-17 PROCEDURE — 80076 HEPATIC FUNCTION PANEL: CPT

## 2020-09-17 PROCEDURE — 74177 CT ABD & PELVIS W/CONTRAST: CPT

## 2020-09-17 PROCEDURE — 96375 TX/PRO/DX INJ NEW DRUG ADDON: CPT

## 2020-09-17 PROCEDURE — 93010 ELECTROCARDIOGRAM REPORT: CPT | Performed by: INTERNAL MEDICINE

## 2020-09-17 PROCEDURE — 83605 ASSAY OF LACTIC ACID: CPT

## 2020-09-17 PROCEDURE — 85025 COMPLETE CBC W/AUTO DIFF WBC: CPT

## 2020-09-17 PROCEDURE — 80048 BASIC METABOLIC PNL TOTAL CA: CPT

## 2020-09-17 PROCEDURE — 83690 ASSAY OF LIPASE: CPT

## 2020-09-17 PROCEDURE — 2580000003 HC RX 258: Performed by: EMERGENCY MEDICINE

## 2020-09-17 RX ORDER — ONDANSETRON 2 MG/ML
4 INJECTION INTRAMUSCULAR; INTRAVENOUS EVERY 6 HOURS PRN
Status: DISCONTINUED | OUTPATIENT
Start: 2020-09-17 | End: 2020-09-26 | Stop reason: HOSPADM

## 2020-09-17 RX ORDER — PREGABALIN 100 MG/1
300 CAPSULE ORAL 2 TIMES DAILY
Status: DISCONTINUED | OUTPATIENT
Start: 2020-09-17 | End: 2020-09-17

## 2020-09-17 RX ORDER — PROMETHAZINE HYDROCHLORIDE 25 MG/1
12.5 TABLET ORAL EVERY 6 HOURS PRN
Status: DISCONTINUED | OUTPATIENT
Start: 2020-09-17 | End: 2020-09-17

## 2020-09-17 RX ORDER — LANOLIN ALCOHOL/MO/W.PET/CERES
3 CREAM (GRAM) TOPICAL NIGHTLY PRN
Status: DISCONTINUED | OUTPATIENT
Start: 2020-09-17 | End: 2020-09-17

## 2020-09-17 RX ORDER — HEPARIN SODIUM 10000 [USP'U]/ML
5000 INJECTION, SOLUTION INTRAVENOUS; SUBCUTANEOUS EVERY 8 HOURS SCHEDULED
Status: DISCONTINUED | OUTPATIENT
Start: 2020-09-17 | End: 2020-09-18

## 2020-09-17 RX ORDER — SODIUM CHLORIDE 0.9 % (FLUSH) 0.9 %
10 SYRINGE (ML) INJECTION PRN
Status: DISCONTINUED | OUTPATIENT
Start: 2020-09-17 | End: 2020-09-17 | Stop reason: SDUPTHER

## 2020-09-17 RX ORDER — FENTANYL CITRATE 50 UG/ML
50 INJECTION, SOLUTION INTRAMUSCULAR; INTRAVENOUS
Status: CANCELLED | OUTPATIENT
Start: 2020-09-17

## 2020-09-17 RX ORDER — FENTANYL CITRATE 50 UG/ML
50 INJECTION, SOLUTION INTRAMUSCULAR; INTRAVENOUS ONCE
Status: COMPLETED | OUTPATIENT
Start: 2020-09-17 | End: 2020-09-17

## 2020-09-17 RX ORDER — SODIUM CHLORIDE, SODIUM LACTATE, POTASSIUM CHLORIDE, AND CALCIUM CHLORIDE .6; .31; .03; .02 G/100ML; G/100ML; G/100ML; G/100ML
1000 INJECTION, SOLUTION INTRAVENOUS ONCE
Status: COMPLETED | OUTPATIENT
Start: 2020-09-17 | End: 2020-09-17

## 2020-09-17 RX ORDER — FLUTICASONE PROPIONATE 50 MCG
2 SPRAY, SUSPENSION (ML) NASAL DAILY
Status: DISCONTINUED | OUTPATIENT
Start: 2020-09-18 | End: 2020-09-26 | Stop reason: HOSPADM

## 2020-09-17 RX ORDER — ACETAMINOPHEN 325 MG/1
650 TABLET ORAL EVERY 4 HOURS PRN
Status: DISCONTINUED | OUTPATIENT
Start: 2020-09-17 | End: 2020-09-17 | Stop reason: SDUPTHER

## 2020-09-17 RX ORDER — SODIUM CHLORIDE 0.9 % (FLUSH) 0.9 %
10 SYRINGE (ML) INJECTION EVERY 12 HOURS SCHEDULED
Status: DISCONTINUED | OUTPATIENT
Start: 2020-09-17 | End: 2020-09-26 | Stop reason: HOSPADM

## 2020-09-17 RX ORDER — SODIUM CHLORIDE 0.9 % (FLUSH) 0.9 %
10 SYRINGE (ML) INJECTION EVERY 12 HOURS SCHEDULED
Status: DISCONTINUED | OUTPATIENT
Start: 2020-09-17 | End: 2020-09-17 | Stop reason: SDUPTHER

## 2020-09-17 RX ORDER — SODIUM CHLORIDE 0.9 % (FLUSH) 0.9 %
10 SYRINGE (ML) INJECTION PRN
Status: DISCONTINUED | OUTPATIENT
Start: 2020-09-17 | End: 2020-09-26 | Stop reason: HOSPADM

## 2020-09-17 RX ORDER — FENOFIBRATE 54 MG/1
54 TABLET ORAL DAILY
Status: DISCONTINUED | OUTPATIENT
Start: 2020-09-18 | End: 2020-09-17

## 2020-09-17 RX ORDER — PRAVASTATIN SODIUM 20 MG
20 TABLET ORAL NIGHTLY
Status: DISCONTINUED | OUTPATIENT
Start: 2020-09-17 | End: 2020-09-17

## 2020-09-17 RX ORDER — ACETAMINOPHEN 325 MG/1
650 TABLET ORAL EVERY 6 HOURS PRN
Status: DISCONTINUED | OUTPATIENT
Start: 2020-09-17 | End: 2020-09-26 | Stop reason: HOSPADM

## 2020-09-17 RX ORDER — SODIUM CHLORIDE 9 MG/ML
INJECTION, SOLUTION INTRAVENOUS CONTINUOUS
Status: CANCELLED | OUTPATIENT
Start: 2020-09-17

## 2020-09-17 RX ORDER — PANTOPRAZOLE SODIUM 40 MG/10ML
40 INJECTION, POWDER, LYOPHILIZED, FOR SOLUTION INTRAVENOUS DAILY
Status: DISCONTINUED | OUTPATIENT
Start: 2020-09-18 | End: 2020-09-22

## 2020-09-17 RX ORDER — ACETAMINOPHEN 650 MG/1
650 SUPPOSITORY RECTAL EVERY 6 HOURS PRN
Status: DISCONTINUED | OUTPATIENT
Start: 2020-09-17 | End: 2020-09-26 | Stop reason: HOSPADM

## 2020-09-17 RX ORDER — SODIUM CHLORIDE, SODIUM LACTATE, POTASSIUM CHLORIDE, CALCIUM CHLORIDE 600; 310; 30; 20 MG/100ML; MG/100ML; MG/100ML; MG/100ML
1000 INJECTION, SOLUTION INTRAVENOUS ONCE
Status: COMPLETED | OUTPATIENT
Start: 2020-09-18 | End: 2020-09-17

## 2020-09-17 RX ORDER — DULOXETIN HYDROCHLORIDE 30 MG/1
30 CAPSULE, DELAYED RELEASE ORAL DAILY
Status: DISCONTINUED | OUTPATIENT
Start: 2020-09-18 | End: 2020-09-18

## 2020-09-17 RX ORDER — 0.9 % SODIUM CHLORIDE 0.9 %
1000 INTRAVENOUS SOLUTION INTRAVENOUS ONCE
Status: COMPLETED | OUTPATIENT
Start: 2020-09-17 | End: 2020-09-17

## 2020-09-17 RX ORDER — SODIUM CHLORIDE, SODIUM LACTATE, POTASSIUM CHLORIDE, CALCIUM CHLORIDE 600; 310; 30; 20 MG/100ML; MG/100ML; MG/100ML; MG/100ML
INJECTION, SOLUTION INTRAVENOUS CONTINUOUS
Status: DISCONTINUED | OUTPATIENT
Start: 2020-09-17 | End: 2020-09-22

## 2020-09-17 RX ORDER — 0.9 % SODIUM CHLORIDE 0.9 %
500 INTRAVENOUS SOLUTION INTRAVENOUS ONCE
Status: COMPLETED | OUTPATIENT
Start: 2020-09-17 | End: 2020-09-17

## 2020-09-17 RX ORDER — PANTOPRAZOLE SODIUM 40 MG/1
40 TABLET, DELAYED RELEASE ORAL DAILY
Status: DISCONTINUED | OUTPATIENT
Start: 2020-09-18 | End: 2020-09-17

## 2020-09-17 RX ADMIN — SODIUM CHLORIDE 1000 ML: 9 INJECTION, SOLUTION INTRAVENOUS at 19:02

## 2020-09-17 RX ADMIN — FENTANYL CITRATE 50 MCG: 50 INJECTION, SOLUTION INTRAMUSCULAR; INTRAVENOUS at 21:51

## 2020-09-17 RX ADMIN — PIPERACILLIN SODIUM AND TAZOBACTAM SODIUM 3.38 G: 3; .375 INJECTION, POWDER, LYOPHILIZED, FOR SOLUTION INTRAVENOUS at 19:54

## 2020-09-17 RX ADMIN — SODIUM CHLORIDE, POTASSIUM CHLORIDE, SODIUM LACTATE AND CALCIUM CHLORIDE 1000 ML: 600; 310; 30; 20 INJECTION, SOLUTION INTRAVENOUS at 21:52

## 2020-09-17 RX ADMIN — SODIUM CHLORIDE, POTASSIUM CHLORIDE, SODIUM LACTATE AND CALCIUM CHLORIDE: 600; 310; 30; 20 INJECTION, SOLUTION INTRAVENOUS at 23:28

## 2020-09-17 RX ADMIN — IOPAMIDOL 90 ML: 755 INJECTION, SOLUTION INTRAVENOUS at 16:51

## 2020-09-17 RX ADMIN — Medication 10 ML: at 16:51

## 2020-09-17 RX ADMIN — SODIUM CHLORIDE, POTASSIUM CHLORIDE, SODIUM LACTATE AND CALCIUM CHLORIDE 1000 ML: 600; 310; 30; 20 INJECTION, SOLUTION INTRAVENOUS at 23:57

## 2020-09-17 RX ADMIN — Medication 10 ML: at 23:59

## 2020-09-17 RX ADMIN — HYDROMORPHONE HYDROCHLORIDE 0.5 MG: 1 INJECTION, SOLUTION INTRAMUSCULAR; INTRAVENOUS; SUBCUTANEOUS at 23:53

## 2020-09-17 RX ADMIN — FENTANYL CITRATE 50 MCG: 50 INJECTION, SOLUTION INTRAMUSCULAR; INTRAVENOUS at 16:25

## 2020-09-17 RX ADMIN — SODIUM CHLORIDE 500 ML: 9 INJECTION, SOLUTION INTRAVENOUS at 16:25

## 2020-09-17 ASSESSMENT — PAIN DESCRIPTION - PAIN TYPE
TYPE: ACUTE PAIN
TYPE: CHRONIC PAIN

## 2020-09-17 ASSESSMENT — ENCOUNTER SYMPTOMS
WHEEZING: 0
EYES NEGATIVE: 1
NAUSEA: 0
DIARRHEA: 1
COUGH: 0
ABDOMINAL PAIN: 1
SHORTNESS OF BREATH: 1
EYE PAIN: 0
BLOOD IN STOOL: 1
ALLERGIC/IMMUNOLOGIC NEGATIVE: 1
VOMITING: 0
EYE DISCHARGE: 0
BACK PAIN: 1
CONSTIPATION: 0
RECTAL PAIN: 1

## 2020-09-17 ASSESSMENT — PAIN SCALES - GENERAL
PAINLEVEL_OUTOF10: 7
PAINLEVEL_OUTOF10: 10
PAINLEVEL_OUTOF10: 8
PAINLEVEL_OUTOF10: 8

## 2020-09-17 ASSESSMENT — PAIN DESCRIPTION - LOCATION
LOCATION: BACK;ABDOMEN
LOCATION: BACK

## 2020-09-17 ASSESSMENT — PAIN DESCRIPTION - DESCRIPTORS: DESCRIPTORS: ACHING;CONSTANT

## 2020-09-17 ASSESSMENT — PAIN DESCRIPTION - FREQUENCY: FREQUENCY: CONTINUOUS

## 2020-09-17 ASSESSMENT — PAIN DESCRIPTION - ONSET: ONSET: ON-GOING

## 2020-09-17 NOTE — ADT AUTH CERT
Patient Demographics     Name  Patient ID  SSN  Gender Identity  Birth Date    Elodia Brown  [id number]    Male  [birth year] (44 yrs)    Address  Phone  Email  Employer     [address]  [phone] (T)   [phone] (K)  --  NOT EMPLOYED   [address]  [place]  Occupation  Emp Status     [address] [facility]  --  Not Employed     Reg Status  PCP  Date Last Verified  Next Review Date     Verified  Anthony Nuno, [address]  [phone]  20     Admission Date  Discharge Date  Admitting Provider      20  Madeleine Marmolejo MD      Marital Status  Baptism         L[employer]-day       Emergency Contact 1  Emergency Contact 2  Emergency Contact 3  Emergency Contact 4    Will Chantel Garcia (C)   19  (Y)   [phone] Leesa Goddard)  Suha Garcia [phone] Curlie Berkley)   [phone] Leesa Durbin [phone] Curlie Berkley)  Soco Grijalva 3) [phone] Curlie Berkley)    Subscriber Details   Hospital Account [de-identified]   [address] Name/Sex/Relation  Subscriber   Subscriber Address/Phone  Subscriber Emp/Emp Phone    1.  Alen Weaver   [id number]  Nguyen Bryan - Male   (Self)  1975  [address] [place] [address]  [id number] [phone](W)  NOT EMPLOYED   [phone]    Utilization Reviews         Gastrointestinal Bleeding, Upper - Care Day 8 (9/10/2020) by Talat Jackson RN         Review Status  Review Entered    Completed  2020 10:05        Criteria Review       Care Day: 8 Care Date: 9/10/2020 Level of Care: Intermediate Care    Guideline Day 3    Clinical Status    (X) * Hemodynamic stability    (X) * Stable Hgb/Hct    (X) * Bleeding absent    (X) * Surgical and other acute interventions not needed    (X) * Pain absent or managed    ( ) * Discharge plans and education understood    Activity    ( ) * Ambulatory or acceptable for next level of care    Routes    (X) * Oral hydration, medications, and diet    Interventions (X) Hgb/Hct    9/11/2020 10:05 AM EDT by Tiffanie Schaefer      7.5 / 24.2    Medications    (X) Proton pump inhibitor    * Milestone    Additional Notes    9/10    Day 8       VS    BP 98/60 Comment: manual  Pulse 102   Temp 97.3 °F (36.3 °C) (Temporal)   Resp 20   Ht 6' (1.829 m)   Wt 235 lb 6.4 oz (106.8 kg)   SpO2 97% RA       MEDS    Percocet 2 tabs x5 doses    metoprolol tartrate 12.5 mg Oral BID    · insulin glargine 3 Units Subcutaneous Nightly    · pantoprazole 40 mg Intravenous BID      And    · sodium chloride (PF) 10 mL Intravenous BID    · pregabalin 300 mg Oral BID    · pravastatin 20 mg Oral Nightly    · miconazole Topical BID    · hydroxyurea 500 mg Oral BID    · fluticasone 2 spray Nasal Daily    · fenofibrate 54 mg Oral Daily    · DULoxetine 30 mg Oral Daily    · cyclobenzaprine 10 mg Oral TID    · sodium chloride flush 10 mL Intravenous 2 times per day    · influenza virus vaccine 0.5 mL Intramuscular Prior to discharge    · insulin lispro 0-6 Units Subcutaneous TID WC    · insulin lispro 0-3 Units Subcutaneous Nightly    · ferrous sulfate 325 mg Oral BID       Internal MD note**    Plan:    Appreciate cardiologist's consult, Lopressor increased to 25 mg BID    Vascular surgery consult for IVC filter in light of history of PE, now with relative contraindication to oral anticoagulation    Continue to monitor H&H, transfuse PRBC to keep hemoglobin above 7 g/dL    On PPI    Awaiting pathology report from cecal mass biopsy    Holding ASA, apixaban    PRN analgesia    Basal and corrective bolus insulin regimen    Discharge planning for home SNF likely after IVC filter placement         DVT Prophylaxis: SCDs, no VTE chemoprophylaxis in light of suspected GI bleed    Planning SNF at d/c       **Vascular consult**    Assesment/Plan    53 y.o. male with intolerance of blood thinners    OR tomorrow for IVCF placement       **Cardio note**    Assessment:     1. Sinus tachycardia probably due to Nightly    · pantoprazole 40 mg Intravenous BID      And    · sodium chloride (PF) 10 mL Intravenous BID    · pregabalin 300 mg Oral BID    · pravastatin 20 mg Oral Nightly    · miconazole Topical BID    · hydroxyurea 500 mg Oral BID    · fluticasone 2 spray Nasal Daily    · fenofibrate 54 mg Oral Daily    · DULoxetine 30 mg Oral Daily    · cyclobenzaprine 10 mg Oral TID    · sodium chloride flush 10 mL Intravenous 2 times per day    · influenza virus vaccine 0.5 mL Intramuscular Prior to discharge    · insulin lispro 0-6 Units Subcutaneous TID WC    · insulin lispro 0-3 Units Subcutaneous Nightly    · ferrous sulfate 325 mg Oral BID       Internal MD note**    Plan:    Obtain EKG    Start Lopressor 12.5 mg BID    Will re-consult Cardiologist    Continue to monitor H&H, transfuse PRBC to keep hemoglobin above 7 g/dL    On PPI    Awaiting pathology report from cecal mass biopsy    Holding ASA, apixaban; plan to resume once okay with surgeon    Continue holding lisinopril for now    PRN analgesia    Discontinue IVF    Basal and corrective bolus insulin regimen         DVT Prophylaxis: SCDs, no VTE chemoprophylaxis in light of suspected GI bleed    Diet: DIET GENERAL;       Cardio consult    Assessment:    1. Tachycardia, currently in sinus at 118.  Episodes of tachycardia in the 150s with no clear P wave -cannot rule out SVT or A. Fib/flutter    2. Acute blood loss anemia hemoglobin 8.4 on admission --> 8.8 today.  Lovenox and aspirin currently on hold. 3. Fecal occult blood test positive status post endoscopy and colonoscopy with large friable nonbleeding mass and melena noted within the cecum -biopsies pending    4. Large coccyx wound    5. Leukocytosis, new    6. Thrombocytosis, platelets 2355    7. Insulin requiring diabetes mellitus, A1c 6.3    8. Hypertension, controlled    9. Dyslipidemia, on statin therapy    10. H/o PE on CT Ab/Pelvis 7/2020 [facility])         Plan:    1.  Increase Lopressor 25 mg twice daily and uptitrate as tolerated/needed    2. Monitor telemetry for arrhythmia    3.  Okay to hold Lovenox in the setting of acute anemia.  Would recommend restarting when okay with general surgery - if active bleeding or surgery is necessary may consider IVC filter for prevention of pulmonary embolism       Surgeon note    GI bleed--unspecified source    Await biopsies from EGD and colonoscopy    Diet as tolerated    Monitor H/H    Continue cholecystostomy tube    OOB/ambulate        Gastrointestinal Bleeding, Upper - Care Day 6 (9/8/2020) by Sowmya Terry RN         Review Status  Review Entered    Completed  9/11/2020 09:56        Criteria Review       Care Day: 6 Care Date: 9/8/2020 Level of Care: Intermediate Care    Guideline Day 3    Clinical Status    ( ) * Hemodynamic stability    9/11/2020 9:56 AM EDT by Christina solitario    (X) * Stable Hgb/Hct    ( ) * Bleeding absent    ( ) * Surgical and other acute interventions not needed    (X) * Pain absent or managed    ( ) * Discharge plans and education understood    Activity    ( ) * Ambulatory or acceptable for next level of care    Routes    (X) * Oral hydration, medications, and diet    Interventions    (X) Hgb/Hct    Medications    (X) Proton pump inhibitor    * Milestone    Additional Notes    9/8    Day 6       Vs    /86   Pulse 110   Temp 96.3 °F (35.7 °C) (Temporal)   Resp 17   Ht 6' (1.829 m)   Wt 243 lb 13.3 oz (110.6 kg)   SpO2 100% RA       MEDS-    NS @ 75 ml/hr    Percocet 2 tabs x3 doses, 1 tab x2 doses    insulin glargine 3 Units Subcutaneous Nightly    · pantoprazole 40 mg Intravenous BID      And    · sodium chloride (PF) 10 mL Intravenous BID    · pregabalin 300 mg Oral BID    · pravastatin 20 mg Oral Nightly    · miconazole Topical BID    · hydroxyurea 500 mg Oral BID    · fluticasone 2 spray Nasal Daily    · fenofibrate 54 mg Oral Daily    · DULoxetine 30 mg Oral Daily    · cyclobenzaprine 10 mg Oral TID    · sodium chloride flush 10 mL Intravenous 2 times per day    · influenza virus vaccine 0.5 mL Intramuscular Prior to discharge    · insulin lispro 0-6 Units Subcutaneous TID WC    · insulin lispro 0-3 Units Subcutaneous Nightly    · ferrous sulfate 325 mg Oral BID          **Internal MD note**    Plan:    Continue to monitor H&H, transfuse PRBC to keep hemoglobin above 7 g/dL    On PPI    Awaiting pathology report from cecal mass biopsy    Holding ASA, apixaban; plan to resume once okay with surgeon    Continue holding lisinopril for now    PRN analgesia    Gentle IV 75 cc/h    Basal and corrective bolus insulin regimen       Surgeon note**    GI bleed--unspecified source    Await biopsies from EGD and colonoscopy    Diet as tolerated    Monitor H/H    Continue cholecystostomy tube    OOB/ambulate           Gastrointestinal Bleeding, Upper - Care Day 5 (9/7/2020) by Sarai Dorado RN         Review Status  Review Entered    Completed  9/11/2020 09:51        Criteria Review       Care Day: 5 Care Date: 9/7/2020 Level of Care: Intermediate Care    Guideline Day 3    Clinical Status    ( ) * Hemodynamic stability    (X) * Stable Hgb/Hct    9/11/2020 9:51 AM EDT by Arian Gallegos      7.5 / 24.1    ( ) * Bleeding absent    ( ) * Surgical and other acute interventions not needed    (X) * Pain absent or managed    9/11/2020 9:51 AM EDT by Arian Gallegos      on percocet    ( ) * Discharge plans and education understood    Activity    ( ) * Ambulatory or acceptable for next level of care    Routes    (X) * Oral hydration, medications, and diet    Interventions    (X) Hgb/Hct    Medications    (X) Proton pump inhibitor    9/11/2020 9:51 AM EDT by Arian Gallegos      protonix    * Milestone    Additional Notes    9/7    Day 5       VS    BP (!) 124/58   Pulse 107   Temp 97.2 °F (36.2 °C) (Temporal)   Resp 16   Ht 6' (1.829 m)   Wt 243 lb 13.3 oz (110.6 kg)   SpO2 98% RA       MEDS    NS @ 75 ml/hr    Percocet 1 tab x4 doses    insulin glargine 3 Units Subcutaneous Nightly    · pantoprazole 40 mg Intravenous BID      And    · sodium chloride (PF) 10 mL Intravenous BID    · pregabalin 300 mg Oral BID    · pravastatin 20 mg Oral Nightly    · miconazole Topical BID    · hydroxyurea 500 mg Oral BID    · fluticasone 2 spray Nasal Daily    · fenofibrate 54 mg Oral Daily    · DULoxetine 30 mg Oral Daily    · cyclobenzaprine 10 mg Oral TID    · sodium chloride flush 10 mL Intravenous 2 times per day    · influenza virus vaccine 0.5 mL Intramuscular Prior to discharge    · insulin lispro 0-6 Units Subcutaneous TID WC    · insulin lispro 0-3 Units Subcutaneous Nightly    · ferrous sulfate 325 mg Oral BID       **Internal MD note**    still reporting melanotic stool, generalized weakness. Assessment    1. GI bleeding - status post EGD on 9/4 which only showed minimal gastritis. Colonscopy on 09/05 showed Cecal mass and melena at Cecum and throughout the colon. Bleeding scan showed no abnormal tracer identified to suggest GI bleed    2. Acute blood loss anemia - likely secondary to above superimposed on baseline anemia of chronic disease    3. Hypotension - suspect due to secondary to hypovolemia, draining cholecystostomy tube likely contributory.  Proved BP with IV hydration, holding lisinopril    4. Type II DM with long-term insulin use and with complication - rate controlled    5. Diabetic neuropathy    6. Hyperlipidemia, mixed    7. Chronic anticoagulation - on apixaban for history of pulmonary embolism, #9; currently on hold for #1.     8. Hx of Thrombocytosis - on Hydroxyurea. 9. Hx of  Splenic thrombosis    10. Hx of splenic infarction - This was likely to be due to splenic thrombosis    11. Sacral decubitus wound, stage II/III (POA)    12. Recent acute cholecystitis - currently patient has cholecystomy tube in place    13. Obesity - Body mass index is 33.07 kg/m².     14. Osteoarthritis of hip joint - status post replacement Plan:    Continue to monitor H&H, transfuse PRBC to keep hemoglobin above 7 g/dL    On PPI    Will follow with general surgeon for further recommendation    Holding ASA, apixaban; plan to resume once okay with surgeon    Continue holding lisinopril for now    PRN analgesia    Decrease IV hydration to 75 cc/h    Basal and corrective bolus insulin regimen         DVT Prophylaxis: SCDs, no VTE chemoprophylaxis in light of suspected GI bleed       **Surgeon note**    Hemoglobin 7.5    Assessment:      GI bleed due to cecal lesion-biopsies pending    Mild gastritis-biopsies pending    Splenic infarction with abscess from prior admission at [facility]    Cholecystitis with cholecystostomy tube from prior admission         Plan:    Await biopsies from colon    Monitor H&H    May have diet    Hold Eliquis    Continue cholecystostomy tube for now        Gastrointestinal Bleeding, Upper - Care Day 4 (9/6/2020) by Sarai Dorado RN         Review Status  Review Entered    Completed  9/11/2020 09:44        Criteria Review       Care Day: 4 Care Date: 9/6/2020 Level of Care: Intermediate Care    Guideline Day 2    Clinical Status    (X) * Hypotension absent    (X) * Bleeding absent or reduced    Routes    (X) * Oral hydration and diet tolerated    Interventions    (X) * NG tube absent    (X) Hgb/Hct    Medications    (X) Proton pump inhibitor    * Milestone    Additional Notes    9/6    Day 4       VS    /86   Pulse 113   Temp 97.7 °F (36.5 °C) (Temporal)   Resp 16   Ht 6' (1.829 m)   Wt 243 lb 13.3 oz (110.6 kg)   SpO2 95% RA       MEDS-    Percocet 1 tab x4 doses    NS @ 125 ml/hr    insulin glargine 5 Units Subcutaneous Nightly    · pantoprazole 40 mg Intravenous BID      And    · sodium chloride (PF) 10 mL Intravenous BID    · pregabalin 300 mg Oral BID    · pravastatin 20 mg Oral Nightly    · miconazole Topical BID    · hydroxyurea 500 mg Oral BID    · fluticasone 2 spray Nasal Daily    · fenofibrate 54 mg Oral Daily    · DULoxetine 30 mg Oral Daily    · cyclobenzaprine 10 mg Oral TID    · sodium chloride flush 10 mL Intravenous 2 times per day    · influenza virus vaccine 0.5 mL Intramuscular Prior to discharge    · insulin lispro 0-6 Units Subcutaneous TID WC    · insulin lispro 0-3 Units Subcutaneous Nightly    · ferrous sulfate 325 mg Oral BID       Hgb stable       **Internal MD note**         ASSESSMENT:    1. Suspected PUD/GI bleeding:patient is on PPI. Apixaban and ASA are held.  Patient did undergo  EGD on 9/4 which only showed minimal gastritis. Colonscopy on 09/05 showed Cecal mass and melena at Cecum and throughout the colon. Bleeding scan showed no GI bleeding. 2. Cecal mass:surgical team plans to do surgery. 3. Anemia of suspected acute blood loss in the setting of underlying anemia of chronic disease:H/H serially monitored and no notable drop noted. Will continue to monitor the H/H on consistent bases. 4. Hypotension: Due to dehydration and also possible volume loss from draining cholecystostomy tube. BP improved to WNL with IVF blouses and maintenance IVF.  Lisinopril discontinued. Monitor the vital signs closely. Will obtain Lactate. 5. Type II DM with long-term insulin use and with complication: On Lantus and sliding scale.  Blood sugar running low and thus Lantus dose reduced. ADA diet. Insulin dose to be adjusted per the BS. 6. Diabetic neuropathy:on Lyrica. 7. Hyperlipidemia, mixed:on Statin and Fenofibrate. 8. Hx of PE: Apixaban was on hold because of suspected GI bleed and also b/c patient is scheduled to have surgical surgery for Cecal mass. 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 Apixaban to be resumed when OK with the surgical team.    9. Hx of Thrombocytosis:on Hydroxyurea.     10. Hx of  Splenic thrombosis: Apixaban to be resumed as soon the GI work up is completed and when OK with the surgical team.    11. Hx of splenic infarction: This was likely to be due to splenic thrombosis    12. Sacral decubitus wound, stage II/III,present on admission: Continue skin and wound care. 13. Hx of acute cholecystitis:Currently patient has cholecystomy tube placement    14. Obesity with BMI of 32.55: Patient sho benefit from lifestyle modification for weight loss    15. Osteoarthritis of hip joint is status post replacement: Continue symptomatic management    16. DVT prophylaxis:SCD. Apixaban on hold at present.        **Surgeon note**    Underwent colonoscopy and bleeding scan yesterday-findings noted    Objective:      Assessment:      GI bleed due to cecal lesion-biopsies pending    Mild gastritis-biopsies pending         Plan:    Await biopsies    Monitor H&H    May have diet    Hold Eliquis    Reviewed with hospitalist           Gastrointestinal Bleeding, Upper - Care Day 3 (9/5/2020) by Beryle Culver, RN         Review Status  Review Entered    Completed  9/11/2020 09:39        Criteria Review       Care Day: 3 Care Date: 9/5/2020 Level of Care: Intermediate Care    Guideline Day 2    Clinical Status    (X) * Hypotension absent    ( ) * Bleeding absent or reduced    Routes    ( ) * Oral hydration and diet tolerated    Interventions    (X) * NG tube absent    (X) Hgb/Hct    9/11/2020 9:39 AM EDT by Stormy Shay      8.0/26.7    Medications    (X) Proton pump inhibitor    9/11/2020 9:39 AM EDT by Stormy Shay      protonix    * Milestone    Additional Notes    9/5    Day 3       VS    /68   Pulse 102   Temp 97.6 °F (36.4 °C) (Temporal)   Resp 16   Ht 6' (1.829 m)   Wt 240 lb (108.9 kg)   SpO2 99% RA       MEDS-    Percocet 1 tab x3 doses    NS @ 125 ml/hr    insulin glargine 10 Units Subcutaneous Nightly    · apixaban 5 mg Oral BID    · pantoprazole 40 mg Intravenous BID      And    · sodium chloride (PF) 10 mL Intravenous BID    · pregabalin 300 mg Oral BID    · pravastatin 20 mg Oral Nightly    · miconazole Topical BID · hydroxyurea 500 mg Oral BID    · fluticasone 2 spray Nasal Daily    · fenofibrate 54 mg Oral Daily    · DULoxetine 30 mg Oral Daily    · cyclobenzaprine 10 mg Oral TID    · sodium chloride flush 10 mL Intravenous 2 times per day    · influenza virus vaccine 0.5 mL Intramuscular Prior to discharge    · insulin lispro 0-6 Units Subcutaneous TID WC    · insulin lispro 0-3 Units Subcutaneous Nightly    · ferrous sulfate 325 mg Oral BID       **Internal MD note**    ASSESSMENT:    1. Suspected PUD/GI bleeding:patient is on PPI. Apixaban and ASA are held.  Patient did undergo  EGD on 9/4 which only showed minimal gastritis. Colonscopy on 09/05 showed Cecal mass and melena at Cecum and throughout the colon. Bleeding scan ordered per the surgical team to r/o small bowel GI bleeding. 2. Anemia of suspected acute blood loss in the setting of underlying anemia of chronic disease:H/H serially monitored and no notable drop noted. Will continue to monitor the H/H on consistent bases. 3. Hypotension: Due to dehydration and also possible volume loss from draining cholecystostomy tube. BP improved to WNL with IVF blouses and maintenance IVF.  Lisinopril discontinued. Monitor the vital signs closely. Will obtain Lactate. 4. Type II DM with long-term insulin use and with complication: On Lantus and sliding scale.  Blood sugar running low and thus Lantus dose reduced. ADA diet. Insulin dose to be adjusted per the BS. 5. Diabetic neuropathy:on Lyrica. 6. Hyperlipidemia, mixed:on Statin and Fenofibrate. 7. Hx of PE: Apixaban was on hold because of suspected GI bleed. 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 Apixaban to be resumed when OK with the surgical team.    8. Hx of Thrombocytosis:on Hydroxyurea. 9. Hx of  Splenic thrombosis: Apixaban to be resumed as soon the GI work up is completed and when OK with the surgical team.    10. Hx of splenic infarction:  This was likely to be due to splenic thrombosis    11. Sacral decubitus wound, stage II/III,present on admission: Continue skin and wound care. 12. Hx of acute cholecystitis:Currently patient has cholecystomy tube placement    13. Obesity with BMI of 32.55: Patient sho benefit from lifestyle modification for weight loss    14. Osteoarthritis of hip joint is status post replacement: Continue symptomatic management    15. DVT prophylaxis:SCD. Apixaban on hold at present. DISPOSITION: Anticipate  possible discharge in two to three days to ECF . COLONOSCOPY DIAGNOSTIC with biopsy - done              Gastrointestinal Bleeding, Upper - Care Day 2 (2020) by Marlo Castellanos RN         Review Status  Review Entered    Completed  2020 14:44        Criteria Review       Care Day: 2 Care Date: 2020 Level of Care:    Guideline Day 2    Clinical Status    ( ) * Hypotension absent    ( ) * Bleeding absent or reduced    Routes    ( ) * Oral hydration and diet tolerated    Interventions    (X) * NG tube absent    (X) Hgb/Hct    2020 2:44 PM EDT by Eliseo Cervantes 7.9/ 25.1    Medications    (X) Proton pump inhibitor    2020 2:44 PM EDT by Rissa De Jesus      IV protonix 40mg bid    * Milestone    Additional Notes        Attending MD:    Upon evaluation patient was lying on the bed.  No supplemental oxygen required.  No new major complaints.  Patient denied chest pain, shortness of breath and palpitation.  No cough and wheezing.  No fever, chills or rigors. No vomiting,abdominal pain and diarrhea. No urinary complaints. Stable overnight. No other overnight issues reported. Temp (24hrs), Av °F (36.1 °C), Min:95.8 °F (35.4 °C), Max:97.6 °F (36.4 °C)       ASSESSMENT:    1. Suspected PUD/GI bleeding:patient is on PPI. Apixaban and ASA are held.  Patient did undergo  EGD on  which only showed minimal gastritis. Continue PPI.     2. Anemia of suspected acute blood loss in the setting of underlying anemia of chronic disease:H/H serially monitored and no notable drop noted. Will continue to monitor the H/H on consistent bases. 3. Hypotension: Due to dehydration and also possible volume loss from draining cholecystostomy tube. BP noted to be low.  Lisinopril discontinued. IVF boluses given.  Maintenance IV fluid at 125 mL/h.  Monitor the vital signs closely. Will obtain Lactate. 4. Type II DM with long-term insulin use and with complication: On Lantus and sliding scale.  Blood sugar running low and thus Lantus dose reduced. ADA diet. Insulin dose to be adjusted per the BS. 5. Diabetic neuropathy:on Lyrica. 6. Hyperlipidemia, mixed:on Statin and Fenofibrate. 7. Hx of PE: Apixaban was on hold because of suspected GI bleed. GI showed minimal gastritis and then patient will be resumed on anticoagulation. 8. Hx of Thrombocytosis:on Hydroxyurea. 9. Hx of  Splenic thrombosis: Apixaban was on hold while patient is undergoing GI work-up. Resume Apixaban as the GI work-up is not remarkable. 10. Hx of splenic infarction: This was likely to be due to splenic thrombosis    11. Sacral decubitus wound, stage II/III,present on admission: Continue skin and wound care. 12. Hx of acute cholecystitis:Currently patient has cholecystomy tube placement    13. Obesity with BMI of 32.55: Patient sho benefit from lifestyle modification for weight loss    14. Osteoarthritis of hip joint is status post replacement: Continue symptomatic management    15. DVT prophylaxis:SCD. Apixaban resumed.         DISPOSITION: Anticipate  possible discharge in a day or 2       General Surgeon:    Pre-Op Diagnosis: anemia         Post-Op Diagnosis: Same and minimal gastritis          Procedure(s):    EGD BIOPSY

## 2020-09-17 NOTE — H&P
Inpatient H&P      PCP:  IGOR Lara CNP  Admitting Physician:  No admitting provider for patient encounter. Consultants:  General surgery  Chief Complaint:    Chief Complaint   Patient presents with    Dizziness     from NH, dizziness today, have a JULIO drain that is draining his gallbladder. History of Present Illness  Nereida Driscoll II is a 53 y.o. male who presents to [facility] ER complaining of abdominal pain, dizziness. Te Garcia has a past medical history that includes diabetes mellitus, pulmonary embolism, diabetes, hypertension, colon mass. Alyssa Yang presents with abdominal pain, bleeding from rectum, and dizziness. He does admit to some shortness of breath that also started today that is associated with exertion. He denies any chest pain. He was recently admitted previously for anemia from GI bleed due to recently diagnosed colon mass. He has a history of bilateral pulmonary embolism and IVC filter was recently placed in the setting of blood clots with recurrent bleeding. He also has JULIO drain from being previously admitted to MICU with sepsis from gallbladder or splenic abscess. He is tachycardic and hypotensive, complaining of abdominal pain. Surgery to see. Possibly needs SICU    ER Course  Upon presentation to the ER, routine labwork was performed which revealed CO2 21, Cr 1.5, AG 17, lacticf 4.2, alk phos 143, WBC 17.3, hemoglobin 10.0. Imaging results are as outlined below in the Imaging section of this note. EKG revealed sinus tachycardia. Upon arrival to the ER, patient was tachycardic at 133 with BP 93/74. The patient received 1.5L NS, fentanyl, zosyn in the emergency room and was admitted under the care of [address] [place] [address].     Last Hospital Admission - 9/11/20   Acute GI bleeding [K92.2] 09/03/2020    Acute blood loss anemia [D62]      Thrombocytosis ([facility] [address] [D47.3] 08/10/2020    Other pulmonary embolism without acute cor pulmonale (HCC) [I26.99] three times daily    Historical Provider, MD   fenofibrate (TRICOR) 54 MG tablet Take 54 mg by mouth daily    Historical Provider, MD   ferrous sulfate (IRON 325) 325 (65 Fe) MG tablet Take 325 mg by mouth 2 times daily    Historical Provider, MD   hydroxyurea (HYDREA) 500 MG chemo capsule Take 500 mg by mouth 2 times daily    Historical Provider, MD   loperamide (IMODIUM) 2 MG capsule Take 2 mg by mouth 4 times daily as needed for Diarrhea    Historical Provider, MD   nystatin (MYCOSTATIN) 003050 UNIT/GM powder Apply topically 2 times daily    Historical Provider, MD   insulin lispro (HUMALOG) 100 UNIT/ML injection vial Inject into the skin 3 times daily (before meals) *Plus Sliding Scale*    Historical Provider, MD   pantoprazole (PROTONIX) 40 MG tablet Take 40 mg by mouth daily    Historical Provider, MD   DULoxetine (CYMBALTA) 30 MG extended release capsule Take 1 capsule by mouth daily 5/14/20   IGOR Newsome CNP   pravastatin (PRAVACHOL) 20 MG tablet Take 1 tablet by mouth nightly 5/14/20   IGOR Newsome CNP       Allergies  Seasonal and Tramadol    Social Hx  Social History     Socioeconomic History    Marital status:      Spouse name: Not on file    Number of children: Not on file    Years of education: Not on file    Highest education level: Not on file   Occupational History    Occupation: disabled from     Social Needs    Financial resource strain: Not on file    Food insecurity     Worry: Not on file     Inability: Not on file   [employer] needs     Medical: Not on file     Non-medical: Not on file   Tobacco Use    Smoking status: Never Smoker    Smokeless tobacco: Current User     Types: Chew   Substance and Sexual Activity    Alcohol use: Not Currently     Alcohol/week: 0.0 standard drinks     Frequency: Monthly or less    Drug use: No    Sexual activity: Not Currently   Lifestyle    Physical activity     Days per week: Not on file     Minutes per session: Not on file    Stress: Not on file   Relationships    Social connections     Talks on phone: Not on file     Gets together: Not on file     Attends [employer] service: Not on file     Active member of club or organization: Not on file     Attends meetings of clubs or organizations: Not on file     Relationship status: Not on file    Intimate partner violence     Fear of current or ex partner: Not on file     Emotionally abused: Not on file     Physically abused: Not on file     Forced sexual activity: Not on file   Other Topics Concern    Not on file   Social History Narrative    Not on file       Review of Systems  All bolded are positive; please see HPI  General:  Fever, chills, diaphoresis, fatigue, malaise, night sweats, weight loss  Psychological:  Anxiety, disorientation, hallucinations. ENT:  Epistaxis, headaches, vertigo, visual changes. Cardiovascular:  Chest pain, irregular heartbeats, palpitations, paroxysmal nocturnal dyspnea. Respiratory:  Shortness of breath, coughing, sputum production, hemoptysis, wheezing, orthopnea.   Gastrointestinal:  Nausea, vomiting, diarrhea, heartburn, constipation, abdominal pain, hematemesis, hematochezia, melena, acholic stools  Genito-Urinary:  Dysuria, urgency, frequency, hematuria  Musculoskeletal:  Joint pain, joint stiffness, joint swelling, muscle pain  Neurology:  Headache, focal neurological deficits, weakness, numbness, paresthesia  Derm:  Rashes, ulcers, excoriations, bruising  Extremities:  Decreased ROM, peripheral edema, mottling    Physical Examination  Vitals:  /74   Pulse 126   Temp 97.6 °F (36.4 °C) (Temporal)   Resp 23   Ht 6' (1.829 m)   Wt 234 lb (106.1 kg)   SpO2 97%   BMI 31.74 kg/m²   General Appearance:  awake, alert, and oriented to person, place, time, and purpose; appears stated age and cooperative; no apparent distress no labored breathing  HEENT:  NCAT; PERRL; conjunctiva pink, sclera clear  Neck:  no adenopathy, Polychromasia 2+     Hypochromia 1+     Poikilocytes 1+     Schistocytes 1+     [place] Cells 1+     Target Cells 1+    Basic Metabolic Panel w/ Reflex to MG    Collection Time: 20  2:40 PM   Result Value Ref Range    Sodium 136 132 - 146 mmol/L    Potassium reflex Magnesium 4.5 3.5 - 5.0 mmol/L    Chloride 98 98 - 107 mmol/L    CO2 21 (L) 22 - 29 mmol/L    Anion Gap 17 (H) 7 - 16 mmol/L    Glucose 113 (H) 74 - 99 mg/dL    BUN 17 6 - 20 mg/dL    CREATININE 1.5 (H) 0.7 - 1.2 mg/dL    GFR Non-African American 50 >=60 mL/min/1.73    GFR African American >60     Calcium 9.5 8.6 - 10.2 mg/dL   Hepatic Function Panel    Collection Time: 20  2:40 PM   Result Value Ref Range    Total Protein 6.9 6.4 - 8.3 g/dL    Alb 3.4 (L) 3.5 - 5.2 g/dL    Alkaline Phosphatase 143 (H) 40 - 129 U/L    ALT 13 0 - 40 U/L    AST 15 0 - 39 U/L    Total Bilirubin <0.2 0.0 - 1.2 mg/dL    Bilirubin, Direct <0.2 0.0 - 0.3 mg/dL    Bilirubin, Indirect see below 0.0 - 1.0 mg/dL   Lipase    Collection Time: 20  2:40 PM   Result Value Ref Range    Lipase 6 (L) 13 - 60 U/L   Troponin    Collection Time: 20  2:40 PM   Result Value Ref Range    Troponin 0.03 0.00 - 0.03 ng/mL   Lactic Acid, Plasma    Collection Time: 20  2:40 PM   Result Value Ref Range    Lactic Acid 4.2 (HH) 0.5 - 2.2 mmol/L   APTT    Collection Time: 20  2:40 PM   Result Value Ref Range    aPTT 33.5 24.5 - 35.1 sec   Protime-INR    Collection Time: 20  2:40 PM   Result Value Ref Range    Protime 13.9 (H) 9.3 - 12.4 sec    INR 1.2        Imaging  Nm Gi Blood Loss    Result Date: 2020  Patient MRN:  [id number] : 1975 Age: 53 years Gender: Male Order Date: EXAM: NM GI BLOOD LOSS NUMBER OF IMAGES:  11 INDICATION:  r/o small bowel source of GI bleed COMPARISON: 2020 RADIOPHARMACEUTICAL ADMINISTERED: 27 mCi technetium 99m UltraTag Technique: After injection of radiopharmaceutical, immediate and delayed images of the abdomen were acquired in the anterior projection. Findings:  No abnormal tracer identified to suggest GI bleed. No abnormal tracer identified to suggest GI bleed. Ct Abdomen Pelvis W Iv Contrast Additional Contrast? None    Result Date: 2020  Patient MRN:  [id number] : 1975 Age: 53 years Gender: Male Order Date:  2020 4:51 PM EXAM: CT ABDOMEN PELVIS W IV CONTRAST NUMBER OF IMAGES \ views:  791 INDICATION: Dizziness, abdominal pain COMPARISON: Contemporaneous CTA chest study was performed, CT abdomen pelvis with contrast 9211 TECHNIQUE: Helical imaging was extended from the lower chest to the lower pelvis in conjunction with the intravenous administration of 90 mL of Isovue-370, and axial images were supplemented with sagittal and coronal MPR images. Low-dose CT  acquisition technique included one of following options; 1 . Automated exposure control, 2. Adjustment of MA and or KV according to patient's size or 3. Use of iterative reconstruction. FINDINGS: CHEST: Contemporaneous CTA imaging documented bilateral pulmonary emboli without right heart strain or acute pulmonary or pleural complications. LIVER: The liver is uniform in parenchymal density, and no focal lesions are identified. There is diffuse fatty change. GALLBLADDER AND BILIARY TREE: There is a pigtail drainage catheter in the gallbladder fossa, and it is uncertain if this is a post cholecystectomy drain or if this is a cholecystostomy associated with complete collapse of the gallbladder. There is no biliary ductal ectasia. SPLEEN: A circumscribed hypodense structure in the splenic bed could be a hypodense or infarcted spleen, but shows no evidence of extracapsular fluid or inflammatory change. The finding measures about 10 cm length by about 7 cm in thickness. It is uncertain if this represents residual following splenectomy, but there is no mention in the electronic record of splenectomy. . ADRENALS:  The adrenals are normal in appearance bilaterally. PANCREAS:The pancreas shows no evidence of acute inflammation or mass lesions. KIDNEYS/BLADDER: The kidneys show uniform cortical enhancement and no evidence of outflow obstruction. There is no perinephric inflammatory change. Ureters are similar in course and caliber, and the bladder is normal in appearance. APPENDIX:  Normal BOWEL:  There is no evidence of inflammation, obstruction, or perforation of enteric structures. There is some fluid filled distal ileum, and the previously identified thickening of jejunal loops in the left upper abdomen is again documented. The appearance suggests enteritis with focal stenosis in the anterior right paramedian mid abdomen suggesting a focal stricture. There is no evidence of fistula formation. Crohn's disease would be a differential diagnostic consideration. PELVIS:  There is no pelvic adenopathy or dependent free pelvic fluid. [place] LYMPHATICS: Numerous small lymph nodes are clustered about the small bowel mesenteric root, and there are a few borderline prominent lymph nodes beneath the renal vascular pedicles. VASCULAR: There is no evidence of acute vascular pathology involving mesenteric or retroperitoneal great vessels. There is an inferior vena cava filter. SKELETAL: Mild levoscoliotic curvature and degenerative changes of the lumbar spine are noted. There are bilateral hip arthroplasty elements without acute complications. There is persistent small bowel mural thickening in the midabdomen involving the jejunum and proximal to mid ileum, with an apparent stenosis or focal stricture at about the level of the renal vascular pedicles and inferior vena cava filter in the right paramedian anterior abdomen. There is no evidence of perforation or obstruction, however. There is a pigtail drainage catheter in the gallbladder fossa, which could be a cholecystostomy catheter or a drainage catheter in the gallbladder fossa following cholecystectomy.  The gallbladder is present, it is completely collapsed. A cystic appearing structure is noted in the splenic bed, and could be a splenic remnant or a low-density spleen, not prominently enlarged. Xr Chest Portable    Result Date: 2020  Single frontal projection (one view) of the chest. COMPARISON: 2020 FINDINGS: The heart, lungs, mediastinum and regional skeleton are unremarkable. The costophrenic angles are sharp and clear. There is no evidence of mediastinal shift. The heart is not enlarged. There is no evidence of hilar adenopathy. Lungs are negative for evidence of acute airspace consolidation, effusion, atelectasis, pneumothorax, pathologic nodularity or interstitial thickening. Regional bone density and trabecular pattern are normal.     Negative one view chest.    Cta Pulmonary W Contrast    Result Date: 2020  Patient MRN:  [id number] : 1975 Age: 53 years Gender: Male Order Date:  2020 4:51 PM EXAM: CTA PULMONARY W CONTRAST NUMBER OF IMAGES:  480 INDICATION: Dizziness, cholecystostomy drain COMPARISON: None Technique: Low-dose CT  acquisition technique included one of following options; 1 . Automated exposure control, 2. Adjustment of MA and or KV according to patient's size or 3. Use of iterative reconstruction. Contiguous spiral images were obtained in the axial plane, following the administration of 90 mL of Isovue-370 intravenous contrast using CT angiographic protocol. Sagittal and coronal images were reconstructed from the axial plane acquisition. Addition MIP reconstructions were presented to aid in the interpretation of this study. Findings: The central pulmonary arterial tree shows acute emboli in the descending pulmonary arteries bilaterally, but no areas of regional oligemia, pleural effusion, or peripheral infarction are identified. There is no evidence of right heart strain. Lung window images show no evidence of organized pneumonia or mass lesions.  Soft tissue window images show no evidence of mediastinal adenopathy, and the thoracic aorta shows no acute complications. Bone window images show no acute chest wall traumatic findings. Upper abdominal images show no evidence of acute visceral pathology. Diffuse fatty change of the liver is noted. The patient appears to be post cholecystectomy with a pigtail drain in the gallbladder fossa. The adrenals, kidneys, and included bowel structures are unremarkable for some mural thickening of small bowel in the left upper abdomen that could indicate mild colitis. Diverticuli are noted in the left hemicolon. Bilateral central and dependent pulmonary emboli without peripheral infarcts No evidence of pneumonia, pulmonary mass lesions, or cardiac decompensation. Fatty change of the liver, and there is a pigtail drainage catheter in the gallbladder fossa. Upper abdominal bowel loops show some mural thickening, which is nonspecific, but could indicate enteritis. ALERT:  THIS IS AN ABNORMAL REPORT-bilateral lower lobe central acute pulmonary emboli without complicating right heart strain, pleural effusion, or pulmonary infarcts. Note: The emergency department was contacted telephonically by myself at the time of this dictation communicate findings          Assessment and Plan  Patient is a 53 y.o. male who presented with abdominal pain, GI bleeding, dizziness. The active problem list is as follows:    · Sepsis secondary to abdominal infection with possible interloop fisulta ? possible Crohns- Surgery to see. NPO for any procedures. IVF. Trend lactics. Zosyn  · Acute acalculous cholecystitis- surgery following. With cholecystostomy tube in place  · Known bilateral pulmonary embolism s/p IVC filter placement due to recurrent bleeding  · Hypertension, currently hypotensive  · TYpe 2 diabetes mellitus  · SIMÓN- Continue IVF    · Routine labs in the morning. · DVT prophylaxis.   · Please see orders for further management and kei.        Preeti Kwok DO  6:22 PM  9/17/2020

## 2020-09-17 NOTE — ED PROVIDER NOTES
HPI   52-year-old male patient presented to the emergency department with new onset dizziness and shortness of breath which started earlier today. Dizziness worsens when patient goes from seated to standing position. Shortness of breath is worse with exertion. Denies any chest pain. Also complaining of abdominal pain. Patient was recently admitted to the hospital for anemia. Diagnosed with colon cancer and lower GI bleeding. His most recent hemoglobin is 7.2 on September 11. Patient also says that he has had a JULIO drain in his gallbladder for the last month and a half. He has been draining the JULIO bulb every few hours. He has a history of 3 heart attacks, cardiac arrest, IVC filter in place with pulmonary emboli. Review of Systems   Constitutional: Negative for chills and fatigue. HENT: Negative for congestion. Eyes: Negative for pain and discharge. Respiratory: Positive for shortness of breath. Negative for cough and wheezing. Cardiovascular: Negative for chest pain and leg swelling. Gastrointestinal: Positive for abdominal pain and rectal pain. Genitourinary: Negative for difficulty urinating and hematuria. Musculoskeletal: Positive for back pain. Right shoulder pain, previous injury   Skin:        Open sacral wound   Neurological: Positive for dizziness and light-headedness. Psychiatric/Behavioral: Negative for confusion. Physical Exam  Vitals signs and nursing note reviewed. Constitutional:       Appearance: He is not ill-appearing. HENT:      Head: Normocephalic. Comments: Longitudinal scar over the patient's neck and head. Nose: Nose normal. No congestion. Mouth/Throat:      Mouth: Mucous membranes are moist.      Pharynx: Oropharynx is clear. Eyes:      General: No scleral icterus. Comments: Conjunctival pallor     Neck:      Musculoskeletal: No neck rigidity. Cardiovascular:      Rate and Rhythm: Regular rhythm. Tachycardia present. Pulmonary:      Effort: Pulmonary effort is normal. No respiratory distress. Breath sounds: Normal breath sounds. Abdominal:      General: Bowel sounds are normal.      Tenderness: There is abdominal tenderness (RUQ and LLQ). There is no guarding. Comments: Levy-Benito drain in the right upper quadrant. Wound does not appear to be infected. Musculoskeletal:      Comments: Right shoulder range of motion limited secondary due to pain. Skin:     General: Skin is warm and dry. Comments: Open wound over the coccyx. Does not appear to be infected but tender to palpation. Neurological:      Mental Status: He is alert and oriented to person, place, and time. Procedures     MDM   69-year-old male patient presented to the emergency department with dizziness, shortness of breath and abdominal pain. Patient has a right upper quadrant JULIO drain in his gallbladder. Patient also recently diagnosed with colonic mass and rectal bleeding as well as chronic PEs for which she had IVC filter placed a week and a half ago. Patient's troponin was negative. Today patient has SIMÓN with creatinine of 1.5 up from baseline of 0.8 patient also has elevated lactic acid of 4.2 and white count of 17.3. Abdominal CT scan did not show any acute pathology. However CT pulmonary did reveal known bilateral pulmonary emboli. Blood pressure 108/76 and pulse is tachycardic at 123 patient's EKG does show that he is sinus tach. Patient is receiving fluids and he has gotten pain medicine for abdominal pain. 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 They will resuscitate patient tonight in the surgical ICU and will perform ex lap tomorrow. Surgical resident ordered Zosyn and fluids on patient. EKG: This EKG is signed and interpreted by Dr. Joao Mckenzie.     Rate:131  Rhythm: Sinus  Interpretation: sinus tachycardia, no ischemia  Comparison: Stable as compared to prior             --------------------------------------------- PAST HISTORY ---------------------------------------------  Past Medical History:  has a past medical history of Accident, SIMÓN (acute kidney injury) ([facility] [address] Allergic rhinitis, Chronic back pain, Depression, Diabetes mellitus ([facility] [address] Difficulty sleeping, Displacement of lumbar intervertebral disc without myelopathy, Fibromyalgia, Fractured rib, H/O seasonal allergies, Head injury, Hyperlipidemia, Hypertension, Obesity (BMI 35.0-39.9 without comorbidity), Osteoarthritis, and Thoracic or lumbosacral neuritis or radiculitis, unspecified. Past Surgical History:  has a past surgical history that includes Neck surgery (2000); shoulder surgery (2001 &2007); Wrist surgery (2007); Rotator cuff repair (Left, 2014); hernia repair (2001); Shoulder arthroscopy (Left, 11 21 14); Vasectomy; Hip Arthroplasty (Left, 4/11/2016); Total hip arthroplasty (Right, 10/31/2016); Foot surgery (Right, 1985); pr colonoscopy flx dx w/collj spec when pfrmd (N/A, 5/7/2018); hernia repair (Right, 12/9/2019); CT PTC NEW ACCESS (8/3/2020); Upper gastrointestinal endoscopy (N/A, 9/4/2020); and Colonoscopy (N/A, 9/5/2020). Social History:  reports that he has never smoked. His smokeless tobacco use includes chew. He reports previous alcohol use. He reports that he does not use drugs. Family History: family history includes Arthritis in his father; Cancer in his maternal grandfather and paternal uncle; Diabetes in his father and paternal grandfather; Heart Disease in his maternal grandmother; Hypertension in his mother; Stroke in his maternal aunt. The patients home medications have been reviewed.     Allergies: Seasonal and Tramadol    -------------------------------------------------- RESULTS -------------------------------------------------    LABS:  Results for orders placed or performed during the hospital encounter of 09/17/20   CBC Auto Differential   Result Value Ref Range    WBC 17.3 (H) 4.5 - 11.5 E9/L    RBC 3.64 (L) 3.80 - 5.80 E12/L    Hemoglobin 10.0 (L) 12.5 - 16.5 g/dL    Hematocrit 32.3 (L) 37.0 - 54.0 %    MCV 88.7 80.0 - 99.9 fL    MCH 27.5 26.0 - 35.0 pg    MCHC 31.0 (L) 32.0 - 34.5 %    RDW 18.6 (H) 11.5 - 15.0 fL    Platelets 729 (H) 415 - 450 E9/L    MPV 8.5 7.0 - 12.0 fL    Neutrophils % 79.0 43.0 - 80.0 %    Immature Granulocytes % 0.6 0.0 - 5.0 %    Lymphocytes % 9.8 (L) 20.0 - 42.0 %    Monocytes % 9.4 2.0 - 12.0 %    Eosinophils % 1.0 0.0 - 6.0 %    Basophils % 0.2 0.0 - 2.0 %    Neutrophils Absolute 13.66 (H) 1.80 - 7.30 E9/L    Immature Granulocytes # 0.10 E9/L    Lymphocytes Absolute 1.69 1.50 - 4.00 E9/L    Monocytes Absolute 1.62 (H) 0.10 - 0.95 E9/L    Eosinophils Absolute 0.18 0.05 - 0.50 E9/L    Basophils Absolute 0.03 0.00 - 0.20 E9/L    Anisocytosis 2+     Polychromasia 2+     Hypochromia 1+     Poikilocytes 1+     Schistocytes 1+     Andreina Cells 1+     Target Cells 1+    Basic Metabolic Panel w/ Reflex to MG   Result Value Ref Range    Sodium 136 132 - 146 mmol/L    Potassium reflex Magnesium 4.5 3.5 - 5.0 mmol/L    Chloride 98 98 - 107 mmol/L    CO2 21 (L) 22 - 29 mmol/L    Anion Gap 17 (H) 7 - 16 mmol/L    Glucose 113 (H) 74 - 99 mg/dL    BUN 17 6 - 20 mg/dL    CREATININE 1.5 (H) 0.7 - 1.2 mg/dL    GFR Non-African American 50 >=60 mL/min/1.73    GFR African American >60     Calcium 9.5 8.6 - 10.2 mg/dL   Hepatic Function Panel   Result Value Ref Range    Total Protein 6.9 6.4 - 8.3 g/dL    Alb 3.4 (L) 3.5 - 5.2 g/dL    Alkaline Phosphatase 143 (H) 40 - 129 U/L    ALT 13 0 - 40 U/L    AST 15 0 - 39 U/L    Total Bilirubin <0.2 0.0 - 1.2 mg/dL    Bilirubin, Direct <0.2 0.0 - 0.3 mg/dL    Bilirubin, Indirect see below 0.0 - 1.0 mg/dL   Lipase   Result Value Ref Range    Lipase 6 (L) 13 - 60 U/L   Troponin   Result Value Ref Range    Troponin 0.03 0.00 - 0.03 ng/mL   Lactic Acid, Plasma   Result Value Ref Range    Lactic Acid 4.2 (HH) 0.5 - 2.2 mmol/L APTT   Result Value Ref Range    aPTT 33.5 24.5 - 35.1 sec   Protime-INR   Result Value Ref Range    Protime 13.9 (H) 9.3 - 12.4 sec    INR 1.2    Urinalysis   Result Value Ref Range    Color, UA Yellow Straw/Yellow    Clarity, UA Clear Clear    Glucose, Ur Negative Negative mg/dL    Bilirubin Urine Negative Negative    Ketones, Urine Negative Negative mg/dL    Specific Gravity, UA 1.010 1.005 - 1.030    Blood, Urine Negative Negative    pH, UA 6.0 5.0 - 9.0    Protein, UA Negative Negative mg/dL    Urobilinogen, Urine 0.2 <2.0 E.U./dL    Nitrite, Urine Negative Negative    Leukocyte Esterase, Urine Negative Negative   EKG 12 Lead   Result Value Ref Range    Ventricular Rate 131 BPM    Atrial Rate 131 BPM    P-R Interval 144 ms    QRS Duration 88 ms    Q-T Interval 286 ms    QTc Calculation (Bazett) 422 ms    P Axis 58 degrees    R Axis 46 degrees    T Axis 59 degrees       RADIOLOGY:  CTA PULMONARY W CONTRAST   Final Result   Bilateral central and dependent pulmonary emboli without peripheral   infarcts      No evidence of pneumonia, pulmonary mass lesions, or cardiac   decompensation. Fatty change of the liver, and there is a pigtail drainage catheter in   the gallbladder fossa. Upper abdominal bowel loops show some mural   thickening, which is nonspecific, but could indicate enteritis. ALERT:  THIS IS AN ABNORMAL REPORT-bilateral lower lobe central acute   pulmonary emboli without complicating right heart strain, pleural   effusion, or pulmonary infarcts.          Note: The emergency department was contacted telephonically by myself   at the time of this dictation communicate findings      CT ABDOMEN PELVIS W IV CONTRAST Additional Contrast? None   Final Result   There is persistent small bowel mural thickening in the midabdomen   involving the jejunum and proximal to mid ileum, with an apparent   stenosis or focal stricture at about the level of the renal vascular   pedicles and inferior vena cava filter in the right paramedian   anterior abdomen. There is no evidence of perforation or obstruction,   however. There is a pigtail drainage catheter in the gallbladder fossa, which   could be a cholecystostomy catheter or a drainage catheter in the   gallbladder fossa following cholecystectomy. The gallbladder is   present, it is completely collapsed. A cystic appearing structure is noted in the splenic bed, and could be   a splenic remnant or a low-density spleen, not prominently enlarged. XR CHEST PORTABLE   Final Result      Negative one view chest.          ------------------------- NURSING NOTES AND VITALS REVIEWED ---------------------------  Date / Time Roomed:  9/17/2020  2:10 PM  ED Bed Assignment:  18A/18A-18    The nursing notes within the ED encounter and vital signs as below have been reviewed. Patient Vitals for the past 24 hrs:   BP Temp Temp src Pulse Resp SpO2 Height Weight   09/17/20 1900 98/72 98.2 °F (36.8 °C) -- 119 17 98 % -- --   09/17/20 1630 103/74 -- -- 126 26 -- -- --   09/17/20 1628 103/74 -- -- 126 23 -- -- --   09/17/20 1615 -- -- -- 124 14 -- -- --   09/17/20 1600 108/76 -- -- 123 14 98 % -- --   09/17/20 1410 93/74 97.6 °F (36.4 °C) Temporal 133 18 97 % 6' (1.829 m) 234 lb (106.1 kg)       Oxygen Saturation Interpretation: Normal    ------------------------------------------ PROGRESS NOTES ------------------------------------------  Re-evaluation(s):  Time: 2598  Patients symptoms show no change  Repeat physical examination is not changed    Counseling:  I have spoken with the patient and discussed todays results, in addition to providing specific details for the plan of care and counseling regarding the diagnosis and prognosis. Their questions are answered at this time and they are agreeable with the plan of admission.    --------------------------------- ADDITIONAL PROVIDER NOTES ---------------------------------  Consultations:  Time: 1815.  Spoke with Dr. Latonia Sullivan. Discussed case. Spoke with Dr. Tavo Kiran patient will be admitted to SICU and scheduled for exlap tomorrow. This patient's ED course included: a personal history and physicial examination, re-evaluation prior to disposition, multiple bedside re-evaluations, IV medications, cardiac monitoring and continuous pulse oximetry    This patient has remained hemodynamically stable during their ED course. Diagnosis:  1. Septicemia ([facility] [address]    2. SIMÓN (acute kidney injury) ([facility] [address]    3. Dehydration    4. Anemia, unspecified type    5. Abdominal pain, unspecified abdominal location    6. Leukocytosis, unspecified type    7. Elevated lactic acid level        Disposition:  Patient's disposition: Admit to SICU  Patient's condition is stable.                Ty Mckeon DO  Resident  09/17/20 0597

## 2020-09-17 NOTE — ED NOTES
Bed: 18A-18  Expected date:   Expected time:   Means of arrival:   Comments:  liz King RN  09/17/20 5395

## 2020-09-18 ENCOUNTER — ANESTHESIA EVENT (OUTPATIENT)
Dept: OPERATING ROOM | Age: 45
DRG: 710 | End: 2020-09-18
Payer: COMMERCIAL

## 2020-09-18 ENCOUNTER — ANESTHESIA (OUTPATIENT)
Dept: OPERATING ROOM | Age: 45
DRG: 710 | End: 2020-09-18
Payer: COMMERCIAL

## 2020-09-18 VITALS — RESPIRATION RATE: 1 BRPM | OXYGEN SATURATION: 100 % | TEMPERATURE: 99.5 F

## 2020-09-18 LAB
ALBUMIN SERPL-MCNC: 2.7 G/DL (ref 3.5–5.2)
ALBUMIN SERPL-MCNC: 2.7 G/DL (ref 3.5–5.2)
ALP BLD-CCNC: 118 U/L (ref 40–129)
ALP BLD-CCNC: 139 U/L (ref 40–129)
ALT SERPL-CCNC: 25 U/L (ref 0–40)
ALT SERPL-CCNC: 7 U/L (ref 0–40)
ANION GAP SERPL CALCULATED.3IONS-SCNC: 14 MMOL/L (ref 7–16)
ANION GAP SERPL CALCULATED.3IONS-SCNC: 19 MMOL/L (ref 7–16)
ANISOCYTOSIS: ABNORMAL
AST SERPL-CCNC: 52 U/L (ref 0–39)
AST SERPL-CCNC: 9 U/L (ref 0–39)
BASOPHILS ABSOLUTE: 0.04 E9/L (ref 0–0.2)
BASOPHILS RELATIVE PERCENT: 0.2 % (ref 0–2)
BILIRUB SERPL-MCNC: <0.2 MG/DL (ref 0–1.2)
BILIRUB SERPL-MCNC: <0.2 MG/DL (ref 0–1.2)
BILIRUBIN DIRECT: <0.2 MG/DL (ref 0–0.3)
BILIRUBIN, INDIRECT: ABNORMAL MG/DL (ref 0–1)
BUN BLDV-MCNC: 17 MG/DL (ref 6–20)
BUN BLDV-MCNC: 18 MG/DL (ref 6–20)
BURR CELLS: ABNORMAL
CALCIUM IONIZED: 1.18 MMOL/L (ref 1.15–1.33)
CALCIUM IONIZED: 1.24 MMOL/L (ref 1.15–1.33)
CALCIUM SERPL-MCNC: 8.5 MG/DL (ref 8.6–10.2)
CALCIUM SERPL-MCNC: 8.5 MG/DL (ref 8.6–10.2)
CHLORIDE BLD-SCNC: 100 MMOL/L (ref 98–107)
CHLORIDE BLD-SCNC: 97 MMOL/L (ref 98–107)
CHOLESTEROL, TOTAL: 94 MG/DL (ref 0–199)
CO2: 15 MMOL/L (ref 22–29)
CO2: 20 MMOL/L (ref 22–29)
CREAT SERPL-MCNC: 1 MG/DL (ref 0.7–1.2)
CREAT SERPL-MCNC: 1.4 MG/DL (ref 0.7–1.2)
EOSINOPHILS ABSOLUTE: 0.05 E9/L (ref 0.05–0.5)
EOSINOPHILS RELATIVE PERCENT: 0.2 % (ref 0–6)
GFR AFRICAN AMERICAN: >60
GFR AFRICAN AMERICAN: >60
GFR NON-AFRICAN AMERICAN: 54 ML/MIN/1.73
GFR NON-AFRICAN AMERICAN: >60 ML/MIN/1.73
GLUCOSE BLD-MCNC: 124 MG/DL (ref 74–99)
GLUCOSE BLD-MCNC: 331 MG/DL (ref 74–99)
HBA1C MFR BLD: 5.5 % (ref 4–5.6)
HCT VFR BLD CALC: 24.8 % (ref 37–54)
HCT VFR BLD CALC: 26.3 % (ref 37–54)
HDLC SERPL-MCNC: 33 MG/DL
HEMOGLOBIN: 7.9 G/DL (ref 12.5–16.5)
HEMOGLOBIN: 8.2 G/DL (ref 12.5–16.5)
HYPOCHROMIA: ABNORMAL
IMMATURE GRANULOCYTES #: 0.26 E9/L
IMMATURE GRANULOCYTES %: 1.1 % (ref 0–5)
LACTIC ACID: 4.5 MMOL/L (ref 0.5–2.2)
LDL CHOLESTEROL CALCULATED: 31 MG/DL (ref 0–99)
LYMPHOCYTES ABSOLUTE: 1.67 E9/L (ref 1.5–4)
LYMPHOCYTES RELATIVE PERCENT: 7 % (ref 20–42)
MAGNESIUM: 1.4 MG/DL (ref 1.6–2.6)
MCH RBC QN AUTO: 27.3 PG (ref 26–35)
MCH RBC QN AUTO: 27.6 PG (ref 26–35)
MCHC RBC AUTO-ENTMCNC: 31.2 % (ref 32–34.5)
MCHC RBC AUTO-ENTMCNC: 31.9 % (ref 32–34.5)
MCV RBC AUTO: 85.8 FL (ref 80–99.9)
MCV RBC AUTO: 88.6 FL (ref 80–99.9)
METER GLUCOSE: 138 MG/DL (ref 74–99)
METER GLUCOSE: 170 MG/DL (ref 74–99)
METER GLUCOSE: 322 MG/DL (ref 74–99)
MONOCYTES ABSOLUTE: 1.32 E9/L (ref 0.1–0.95)
MONOCYTES RELATIVE PERCENT: 5.5 % (ref 2–12)
NEUTROPHILS ABSOLUTE: 20.55 E9/L (ref 1.8–7.3)
NEUTROPHILS RELATIVE PERCENT: 86 % (ref 43–80)
OVALOCYTES: ABNORMAL
PDW BLD-RTO: 18.3 FL (ref 11.5–15)
PDW BLD-RTO: 18.4 FL (ref 11.5–15)
PHOSPHORUS: 4.2 MG/DL (ref 2.5–4.5)
PHOSPHORUS: 6.6 MG/DL (ref 2.5–4.5)
PLATELET # BLD: 479 E9/L (ref 130–450)
PLATELET # BLD: 498 E9/L (ref 130–450)
PMV BLD AUTO: 8.4 FL (ref 7–12)
PMV BLD AUTO: 8.6 FL (ref 7–12)
POIKILOCYTES: ABNORMAL
POLYCHROMASIA: ABNORMAL
POTASSIUM SERPL-SCNC: 4.3 MMOL/L (ref 3.5–5)
POTASSIUM SERPL-SCNC: 5.1 MMOL/L (ref 3.5–5)
RBC # BLD: 2.89 E12/L (ref 3.8–5.8)
RBC # BLD: 2.97 E12/L (ref 3.8–5.8)
SCHISTOCYTES: ABNORMAL
SODIUM BLD-SCNC: 131 MMOL/L (ref 132–146)
SODIUM BLD-SCNC: 134 MMOL/L (ref 132–146)
TARGET CELLS: ABNORMAL
TOTAL PROTEIN: 5.7 G/DL (ref 6.4–8.3)
TOTAL PROTEIN: 5.8 G/DL (ref 6.4–8.3)
TRIGL SERPL-MCNC: 151 MG/DL (ref 0–149)
TSH SERPL DL<=0.05 MIU/L-ACNC: 0.83 UIU/ML (ref 0.27–4.2)
VLDLC SERPL CALC-MCNC: 30 MG/DL
WBC # BLD: 10.6 E9/L (ref 4.5–11.5)
WBC # BLD: 23.9 E9/L (ref 4.5–11.5)

## 2020-09-18 PROCEDURE — 37799 UNLISTED PX VASCULAR SURGERY: CPT

## 2020-09-18 PROCEDURE — 80061 LIPID PANEL: CPT

## 2020-09-18 PROCEDURE — 84100 ASSAY OF PHOSPHORUS: CPT

## 2020-09-18 PROCEDURE — 85027 COMPLETE CBC AUTOMATED: CPT

## 2020-09-18 PROCEDURE — 80048 BASIC METABOLIC PNL TOTAL CA: CPT

## 2020-09-18 PROCEDURE — 80076 HEPATIC FUNCTION PANEL: CPT

## 2020-09-18 PROCEDURE — 07TP0ZZ RESECTION OF SPLEEN, OPEN APPROACH: ICD-10-PCS | Performed by: SURGERY

## 2020-09-18 PROCEDURE — 2580000003 HC RX 258: Performed by: STUDENT IN AN ORGANIZED HEALTH CARE EDUCATION/TRAINING PROGRAM

## 2020-09-18 PROCEDURE — 6360000002 HC RX W HCPCS: Performed by: GENERAL PRACTICE

## 2020-09-18 PROCEDURE — 83735 ASSAY OF MAGNESIUM: CPT

## 2020-09-18 PROCEDURE — 2500000003 HC RX 250 WO HCPCS: Performed by: NURSE ANESTHETIST, CERTIFIED REGISTERED

## 2020-09-18 PROCEDURE — 88304 TISSUE EXAM BY PATHOLOGIST: CPT

## 2020-09-18 PROCEDURE — 82330 ASSAY OF CALCIUM: CPT

## 2020-09-18 PROCEDURE — 38100 REMOVAL OF SPLEEN TOTAL: CPT | Performed by: SURGERY

## 2020-09-18 PROCEDURE — 2580000003 HC RX 258: Performed by: NURSE ANESTHETIST, CERTIFIED REGISTERED

## 2020-09-18 PROCEDURE — 7100000000 HC PACU RECOVERY - FIRST 15 MIN

## 2020-09-18 PROCEDURE — 85025 COMPLETE CBC W/AUTO DIFF WBC: CPT

## 2020-09-18 PROCEDURE — 36415 COLL VENOUS BLD VENIPUNCTURE: CPT

## 2020-09-18 PROCEDURE — 3700000001 HC ADD 15 MINUTES (ANESTHESIA): Performed by: SURGERY

## 2020-09-18 PROCEDURE — 6360000002 HC RX W HCPCS: Performed by: STUDENT IN AN ORGANIZED HEALTH CARE EDUCATION/TRAINING PROGRAM

## 2020-09-18 PROCEDURE — 2000000000 HC ICU R&B

## 2020-09-18 PROCEDURE — 2720000010 HC SURG SUPPLY STERILE: Performed by: SURGERY

## 2020-09-18 PROCEDURE — 6370000000 HC RX 637 (ALT 250 FOR IP): Performed by: STUDENT IN AN ORGANIZED HEALTH CARE EDUCATION/TRAINING PROGRAM

## 2020-09-18 PROCEDURE — 83036 HEMOGLOBIN GLYCOSYLATED A1C: CPT

## 2020-09-18 PROCEDURE — 47600 CHOLECYSTECTOMY: CPT | Performed by: SURGERY

## 2020-09-18 PROCEDURE — 3600000004 HC SURGERY LEVEL 4 BASE: Performed by: SURGERY

## 2020-09-18 PROCEDURE — 80053 COMPREHEN METABOLIC PANEL: CPT

## 2020-09-18 PROCEDURE — 0DTF0ZZ RESECTION OF RIGHT LARGE INTESTINE, OPEN APPROACH: ICD-10-PCS | Performed by: SURGERY

## 2020-09-18 PROCEDURE — 7100000001 HC PACU RECOVERY - ADDTL 15 MIN

## 2020-09-18 PROCEDURE — 6370000000 HC RX 637 (ALT 250 FOR IP): Performed by: INTERNAL MEDICINE

## 2020-09-18 PROCEDURE — 2500000003 HC RX 250 WO HCPCS: Performed by: STUDENT IN AN ORGANIZED HEALTH CARE EDUCATION/TRAINING PROGRAM

## 2020-09-18 PROCEDURE — 94770 HC ETCO2 MONITOR DAILY: CPT

## 2020-09-18 PROCEDURE — 2709999900 HC NON-CHARGEABLE SUPPLY: Performed by: SURGERY

## 2020-09-18 PROCEDURE — 88305 TISSUE EXAM BY PATHOLOGIST: CPT

## 2020-09-18 PROCEDURE — 6360000002 HC RX W HCPCS

## 2020-09-18 PROCEDURE — 51702 INSERT TEMP BLADDER CATH: CPT

## 2020-09-18 PROCEDURE — 83605 ASSAY OF LACTIC ACID: CPT

## 2020-09-18 PROCEDURE — 3700000000 HC ANESTHESIA ATTENDED CARE: Performed by: SURGERY

## 2020-09-18 PROCEDURE — 0FT40ZZ RESECTION OF GALLBLADDER, OPEN APPROACH: ICD-10-PCS | Performed by: SURGERY

## 2020-09-18 PROCEDURE — 88307 TISSUE EXAM BY PATHOLOGIST: CPT

## 2020-09-18 PROCEDURE — 0DBU0ZZ EXCISION OF OMENTUM, OPEN APPROACH: ICD-10-PCS | Performed by: SURGERY

## 2020-09-18 PROCEDURE — 84443 ASSAY THYROID STIM HORMONE: CPT

## 2020-09-18 PROCEDURE — 2580000003 HC RX 258: Performed by: EMERGENCY MEDICINE

## 2020-09-18 PROCEDURE — 6360000002 HC RX W HCPCS: Performed by: NURSE ANESTHETIST, CERTIFIED REGISTERED

## 2020-09-18 PROCEDURE — 44160 REMOVAL OF COLON: CPT | Performed by: SURGERY

## 2020-09-18 PROCEDURE — 2500000003 HC RX 250 WO HCPCS

## 2020-09-18 PROCEDURE — 3600000014 HC SURGERY LEVEL 4 ADDTL 15MIN: Performed by: SURGERY

## 2020-09-18 PROCEDURE — C9113 INJ PANTOPRAZOLE SODIUM, VIA: HCPCS | Performed by: STUDENT IN AN ORGANIZED HEALTH CARE EDUCATION/TRAINING PROGRAM

## 2020-09-18 PROCEDURE — 2780000010 HC IMPLANT OTHER: Performed by: SURGERY

## 2020-09-18 PROCEDURE — 99291 CRITICAL CARE FIRST HOUR: CPT | Performed by: SURGERY

## 2020-09-18 PROCEDURE — 82962 GLUCOSE BLOOD TEST: CPT

## 2020-09-18 RX ORDER — FENTANYL CITRATE 50 UG/ML
INJECTION, SOLUTION INTRAMUSCULAR; INTRAVENOUS PRN
Status: DISCONTINUED | OUTPATIENT
Start: 2020-09-18 | End: 2020-09-18 | Stop reason: SDUPTHER

## 2020-09-18 RX ORDER — MAGNESIUM SULFATE IN WATER 40 MG/ML
4 INJECTION, SOLUTION INTRAVENOUS ONCE
Status: COMPLETED | OUTPATIENT
Start: 2020-09-18 | End: 2020-09-18

## 2020-09-18 RX ORDER — GLYCOPYRROLATE 1 MG/5 ML
SYRINGE (ML) INTRAVENOUS PRN
Status: DISCONTINUED | OUTPATIENT
Start: 2020-09-18 | End: 2020-09-18 | Stop reason: SDUPTHER

## 2020-09-18 RX ORDER — NALOXONE HYDROCHLORIDE 0.4 MG/ML
0.4 INJECTION, SOLUTION INTRAMUSCULAR; INTRAVENOUS; SUBCUTANEOUS PRN
Status: DISCONTINUED | OUTPATIENT
Start: 2020-09-18 | End: 2020-09-22

## 2020-09-18 RX ORDER — PROPOFOL 10 MG/ML
INJECTION, EMULSION INTRAVENOUS PRN
Status: DISCONTINUED | OUTPATIENT
Start: 2020-09-18 | End: 2020-09-18 | Stop reason: SDUPTHER

## 2020-09-18 RX ORDER — SODIUM CHLORIDE, SODIUM LACTATE, POTASSIUM CHLORIDE, CALCIUM CHLORIDE 600; 310; 30; 20 MG/100ML; MG/100ML; MG/100ML; MG/100ML
1000 INJECTION, SOLUTION INTRAVENOUS ONCE
Status: COMPLETED | OUTPATIENT
Start: 2020-09-18 | End: 2020-09-18

## 2020-09-18 RX ORDER — DEXTROSE MONOHYDRATE 25 G/50ML
12.5 INJECTION, SOLUTION INTRAVENOUS PRN
Status: DISCONTINUED | OUTPATIENT
Start: 2020-09-18 | End: 2020-09-26 | Stop reason: HOSPADM

## 2020-09-18 RX ORDER — MAGNESIUM SULFATE HEPTAHYDRATE 500 MG/ML
1 INJECTION, SOLUTION INTRAMUSCULAR; INTRAVENOUS ONCE
Status: DISCONTINUED | OUTPATIENT
Start: 2020-09-18 | End: 2020-09-18 | Stop reason: CLARIF

## 2020-09-18 RX ORDER — NICOTINE POLACRILEX 4 MG
15 LOZENGE BUCCAL PRN
Status: DISCONTINUED | OUTPATIENT
Start: 2020-09-18 | End: 2020-09-26 | Stop reason: HOSPADM

## 2020-09-18 RX ORDER — MIDAZOLAM HYDROCHLORIDE 1 MG/ML
INJECTION INTRAMUSCULAR; INTRAVENOUS PRN
Status: DISCONTINUED | OUTPATIENT
Start: 2020-09-18 | End: 2020-09-18 | Stop reason: SDUPTHER

## 2020-09-18 RX ORDER — LIDOCAINE HYDROCHLORIDE 20 MG/ML
INJECTION, SOLUTION EPIDURAL; INFILTRATION; INTRACAUDAL; PERINEURAL PRN
Status: DISCONTINUED | OUTPATIENT
Start: 2020-09-18 | End: 2020-09-18 | Stop reason: SDUPTHER

## 2020-09-18 RX ORDER — PRAVASTATIN SODIUM 20 MG
20 TABLET ORAL NIGHTLY
Status: DISCONTINUED | OUTPATIENT
Start: 2020-09-18 | End: 2020-09-18

## 2020-09-18 RX ORDER — ESMOLOL HYDROCHLORIDE 10 MG/ML
INJECTION INTRAVENOUS PRN
Status: DISCONTINUED | OUTPATIENT
Start: 2020-09-18 | End: 2020-09-18 | Stop reason: SDUPTHER

## 2020-09-18 RX ORDER — MAGNESIUM SULFATE 1 G/100ML
1 INJECTION INTRAVENOUS ONCE
Status: COMPLETED | OUTPATIENT
Start: 2020-09-18 | End: 2020-09-18

## 2020-09-18 RX ORDER — ONDANSETRON 2 MG/ML
INJECTION INTRAMUSCULAR; INTRAVENOUS PRN
Status: DISCONTINUED | OUTPATIENT
Start: 2020-09-18 | End: 2020-09-18 | Stop reason: SDUPTHER

## 2020-09-18 RX ORDER — MAGNESIUM SULFATE 1 G/100ML
1 INJECTION INTRAVENOUS PRN
Status: DISCONTINUED | OUTPATIENT
Start: 2020-09-18 | End: 2020-09-18

## 2020-09-18 RX ORDER — DEXAMETHASONE SODIUM PHOSPHATE 10 MG/ML
INJECTION, SOLUTION INTRAMUSCULAR; INTRAVENOUS PRN
Status: DISCONTINUED | OUTPATIENT
Start: 2020-09-18 | End: 2020-09-18 | Stop reason: SDUPTHER

## 2020-09-18 RX ORDER — ROCURONIUM BROMIDE 10 MG/ML
INJECTION, SOLUTION INTRAVENOUS PRN
Status: DISCONTINUED | OUTPATIENT
Start: 2020-09-18 | End: 2020-09-18 | Stop reason: SDUPTHER

## 2020-09-18 RX ORDER — DEXTROSE MONOHYDRATE 50 MG/ML
100 INJECTION, SOLUTION INTRAVENOUS PRN
Status: DISCONTINUED | OUTPATIENT
Start: 2020-09-18 | End: 2020-09-26 | Stop reason: HOSPADM

## 2020-09-18 RX ORDER — METOPROLOL TARTRATE 5 MG/5ML
5 INJECTION INTRAVENOUS EVERY 6 HOURS
Status: DISCONTINUED | OUTPATIENT
Start: 2020-09-18 | End: 2020-09-26 | Stop reason: HOSPADM

## 2020-09-18 RX ORDER — HYDROMORPHONE HCL 110MG/55ML
PATIENT CONTROLLED ANALGESIA SYRINGE INTRAVENOUS PRN
Status: DISCONTINUED | OUTPATIENT
Start: 2020-09-18 | End: 2020-09-18 | Stop reason: SDUPTHER

## 2020-09-18 RX ORDER — SODIUM CHLORIDE 9 MG/ML
INJECTION, SOLUTION INTRAVENOUS CONTINUOUS PRN
Status: DISCONTINUED | OUTPATIENT
Start: 2020-09-18 | End: 2020-09-18 | Stop reason: SDUPTHER

## 2020-09-18 RX ORDER — LABETALOL HYDROCHLORIDE 5 MG/ML
INJECTION, SOLUTION INTRAVENOUS PRN
Status: DISCONTINUED | OUTPATIENT
Start: 2020-09-18 | End: 2020-09-18 | Stop reason: SDUPTHER

## 2020-09-18 RX ADMIN — HYDROMORPHONE HYDROCHLORIDE 0.5 MG: 2 INJECTION, SOLUTION INTRAMUSCULAR; INTRAVENOUS; SUBCUTANEOUS at 19:14

## 2020-09-18 RX ADMIN — FENTANYL CITRATE 100 MCG: 50 INJECTION, SOLUTION INTRAMUSCULAR; INTRAVENOUS at 16:39

## 2020-09-18 RX ADMIN — SODIUM CHLORIDE: 9 INJECTION, SOLUTION INTRAVENOUS at 16:34

## 2020-09-18 RX ADMIN — ACETAMINOPHEN 650 MG: 650 SUPPOSITORY RECTAL at 16:05

## 2020-09-18 RX ADMIN — ONDANSETRON HYDROCHLORIDE 4 MG: 2 INJECTION, SOLUTION INTRAMUSCULAR; INTRAVENOUS at 18:59

## 2020-09-18 RX ADMIN — HYDROMORPHONE HYDROCHLORIDE 0.5 MG: 1 INJECTION, SOLUTION INTRAMUSCULAR; INTRAVENOUS; SUBCUTANEOUS at 09:05

## 2020-09-18 RX ADMIN — HEPARIN SODIUM 5000 UNITS: 10000 INJECTION INTRAVENOUS; SUBCUTANEOUS at 00:24

## 2020-09-18 RX ADMIN — LIDOCAINE HYDROCHLORIDE 100 MG: 20 INJECTION, SOLUTION EPIDURAL; INFILTRATION; INTRACAUDAL; PERINEURAL at 16:39

## 2020-09-18 RX ADMIN — MAGNESIUM SULFATE HEPTAHYDRATE 1 G: 1 INJECTION, SOLUTION INTRAVENOUS at 20:38

## 2020-09-18 RX ADMIN — Medication 10 ML: at 08:57

## 2020-09-18 RX ADMIN — ONDANSETRON 4 MG: 2 INJECTION INTRAMUSCULAR; INTRAVENOUS at 19:54

## 2020-09-18 RX ADMIN — HEPARIN SODIUM 5000 UNITS: 10000 INJECTION INTRAVENOUS; SUBCUTANEOUS at 08:35

## 2020-09-18 RX ADMIN — FLUTICASONE PROPIONATE 2 SPRAY: 50 SPRAY, METERED NASAL at 08:56

## 2020-09-18 RX ADMIN — DEXAMETHASONE SODIUM PHOSPHATE 10 MG: 10 INJECTION, SOLUTION INTRAMUSCULAR; INTRAVENOUS at 16:52

## 2020-09-18 RX ADMIN — INSULIN LISPRO 2 UNITS: 100 INJECTION, SOLUTION INTRAVENOUS; SUBCUTANEOUS at 00:23

## 2020-09-18 RX ADMIN — ESMOLOL HYDROCHLORIDE 10 MG: 10 INJECTION, SOLUTION INTRAVENOUS at 17:10

## 2020-09-18 RX ADMIN — FENTANYL CITRATE 50 MCG: 50 INJECTION, SOLUTION INTRAMUSCULAR; INTRAVENOUS at 17:34

## 2020-09-18 RX ADMIN — FENTANYL CITRATE 100 MCG: 50 INJECTION, SOLUTION INTRAMUSCULAR; INTRAVENOUS at 17:03

## 2020-09-18 RX ADMIN — ESMOLOL HYDROCHLORIDE 10 MG: 10 INJECTION, SOLUTION INTRAVENOUS at 17:39

## 2020-09-18 RX ADMIN — LABETALOL HYDROCHLORIDE 2.5 MG: 5 INJECTION INTRAVENOUS at 17:47

## 2020-09-18 RX ADMIN — SODIUM CHLORIDE, POTASSIUM CHLORIDE, SODIUM LACTATE AND CALCIUM CHLORIDE: 600; 310; 30; 20 INJECTION, SOLUTION INTRAVENOUS at 23:34

## 2020-09-18 RX ADMIN — ROCURONIUM BROMIDE 50 MG: 10 INJECTION, SOLUTION INTRAVENOUS at 16:39

## 2020-09-18 RX ADMIN — METOPROLOL TARTRATE 5 MG: 5 INJECTION INTRAVENOUS at 20:39

## 2020-09-18 RX ADMIN — SODIUM CHLORIDE: 9 INJECTION, SOLUTION INTRAVENOUS at 16:07

## 2020-09-18 RX ADMIN — SODIUM CHLORIDE, POTASSIUM CHLORIDE, SODIUM LACTATE AND CALCIUM CHLORIDE 1000 ML: 600; 310; 30; 20 INJECTION, SOLUTION INTRAVENOUS at 22:35

## 2020-09-18 RX ADMIN — HYDROMORPHONE HYDROCHLORIDE 0.5 MG: 1 INJECTION, SOLUTION INTRAMUSCULAR; INTRAVENOUS; SUBCUTANEOUS at 15:05

## 2020-09-18 RX ADMIN — FENTANYL CITRATE 50 MCG: 50 INJECTION, SOLUTION INTRAMUSCULAR; INTRAVENOUS at 18:24

## 2020-09-18 RX ADMIN — PIPERACILLIN SODIUM AND TAZOBACTAM SODIUM 3.38 G: 3; .375 INJECTION, POWDER, LYOPHILIZED, FOR SOLUTION INTRAVENOUS at 12:09

## 2020-09-18 RX ADMIN — Medication 0.6 MG: at 19:16

## 2020-09-18 RX ADMIN — PIPERACILLIN SODIUM AND TAZOBACTAM SODIUM 3.38 G: 3; .375 INJECTION, POWDER, LYOPHILIZED, FOR SOLUTION INTRAVENOUS at 20:37

## 2020-09-18 RX ADMIN — HYDROMORPHONE HYDROCHLORIDE 0.5 MG: 1 INJECTION, SOLUTION INTRAMUSCULAR; INTRAVENOUS; SUBCUTANEOUS at 12:05

## 2020-09-18 RX ADMIN — MAGNESIUM SULFATE HEPTAHYDRATE 4 G: 40 INJECTION, SOLUTION INTRAVENOUS at 10:48

## 2020-09-18 RX ADMIN — PIPERACILLIN SODIUM AND TAZOBACTAM SODIUM 3.38 G: 3; .375 INJECTION, POWDER, LYOPHILIZED, FOR SOLUTION INTRAVENOUS at 04:20

## 2020-09-18 RX ADMIN — HYDROMORPHONE HYDROCHLORIDE 0.5 MG: 1 INJECTION, SOLUTION INTRAMUSCULAR; INTRAVENOUS; SUBCUTANEOUS at 06:05

## 2020-09-18 RX ADMIN — ESMOLOL HYDROCHLORIDE 10 MG: 10 INJECTION, SOLUTION INTRAVENOUS at 16:39

## 2020-09-18 RX ADMIN — HYDROMORPHONE HYDROCHLORIDE 0.5 MG: 1 INJECTION, SOLUTION INTRAMUSCULAR; INTRAVENOUS; SUBCUTANEOUS at 19:53

## 2020-09-18 RX ADMIN — NEOSTIGMINE METHYLSULFATE 3 MG: 5 INJECTION, SOLUTION INTRAMUSCULAR; INTRAVENOUS; SUBCUTANEOUS at 19:16

## 2020-09-18 RX ADMIN — PROPOFOL 200 MG: 10 INJECTION, EMULSION INTRAVENOUS at 16:39

## 2020-09-18 RX ADMIN — FENTANYL CITRATE 50 MCG: 50 INJECTION, SOLUTION INTRAMUSCULAR; INTRAVENOUS at 16:46

## 2020-09-18 RX ADMIN — PANTOPRAZOLE SODIUM 40 MG: 40 INJECTION, POWDER, FOR SOLUTION INTRAVENOUS at 08:56

## 2020-09-18 RX ADMIN — SODIUM CHLORIDE: 9 INJECTION, SOLUTION INTRAVENOUS at 08:30

## 2020-09-18 RX ADMIN — ROCURONIUM BROMIDE 50 MG: 10 INJECTION, SOLUTION INTRAVENOUS at 17:10

## 2020-09-18 RX ADMIN — Medication 10 ML: at 20:39

## 2020-09-18 RX ADMIN — SODIUM CHLORIDE, POTASSIUM CHLORIDE, SODIUM LACTATE AND CALCIUM CHLORIDE: 600; 310; 30; 20 INJECTION, SOLUTION INTRAVENOUS at 19:05

## 2020-09-18 RX ADMIN — SODIUM CHLORIDE, POTASSIUM CHLORIDE, SODIUM LACTATE AND CALCIUM CHLORIDE: 600; 310; 30; 20 INJECTION, SOLUTION INTRAVENOUS at 16:30

## 2020-09-18 RX ADMIN — SODIUM CHLORIDE, POTASSIUM CHLORIDE, SODIUM LACTATE AND CALCIUM CHLORIDE: 600; 310; 30; 20 INJECTION, SOLUTION INTRAVENOUS at 08:56

## 2020-09-18 RX ADMIN — Medication: at 23:13

## 2020-09-18 RX ADMIN — HYDROMORPHONE HYDROCHLORIDE 0.5 MG: 1 INJECTION, SOLUTION INTRAMUSCULAR; INTRAVENOUS; SUBCUTANEOUS at 02:56

## 2020-09-18 RX ADMIN — MIDAZOLAM 2 MG: 1 INJECTION INTRAMUSCULAR; INTRAVENOUS at 17:07

## 2020-09-18 ASSESSMENT — PAIN SCALES - GENERAL
PAINLEVEL_OUTOF10: 8
PAINLEVEL_OUTOF10: 6
PAINLEVEL_OUTOF10: 8
PAINLEVEL_OUTOF10: 6
PAINLEVEL_OUTOF10: 10
PAINLEVEL_OUTOF10: 10
PAINLEVEL_OUTOF10: 9
PAINLEVEL_OUTOF10: 5
PAINLEVEL_OUTOF10: 5
PAINLEVEL_OUTOF10: 8
PAINLEVEL_OUTOF10: 8
PAINLEVEL_OUTOF10: 5
PAINLEVEL_OUTOF10: 7
PAINLEVEL_OUTOF10: 8
PAINLEVEL_OUTOF10: 10
PAINLEVEL_OUTOF10: 6

## 2020-09-18 ASSESSMENT — PULMONARY FUNCTION TESTS
PIF_VALUE: 17
PIF_VALUE: 23
PIF_VALUE: 16
PIF_VALUE: 22
PIF_VALUE: 21
PIF_VALUE: 21
PIF_VALUE: 0
PIF_VALUE: 22
PIF_VALUE: 23
PIF_VALUE: 22
PIF_VALUE: 0
PIF_VALUE: 16
PIF_VALUE: 0
PIF_VALUE: 22
PIF_VALUE: 23
PIF_VALUE: 0
PIF_VALUE: 21
PIF_VALUE: 20
PIF_VALUE: 23
PIF_VALUE: 18
PIF_VALUE: 23
PIF_VALUE: 21
PIF_VALUE: 20
PIF_VALUE: 22
PIF_VALUE: 21
PIF_VALUE: 19
PIF_VALUE: 22
PIF_VALUE: 22
PIF_VALUE: 3
PIF_VALUE: 22
PIF_VALUE: 22
PIF_VALUE: 0
PIF_VALUE: 22
PIF_VALUE: 21
PIF_VALUE: 23
PIF_VALUE: 22
PIF_VALUE: 20
PIF_VALUE: 20
PIF_VALUE: 22
PIF_VALUE: 0
PIF_VALUE: 22
PIF_VALUE: 20
PIF_VALUE: 21
PIF_VALUE: 23
PIF_VALUE: 0
PIF_VALUE: 20
PIF_VALUE: 3
PIF_VALUE: 20
PIF_VALUE: 22
PIF_VALUE: 23
PIF_VALUE: 19
PIF_VALUE: 22
PIF_VALUE: 22
PIF_VALUE: 16
PIF_VALUE: 22
PIF_VALUE: 0
PIF_VALUE: 21
PIF_VALUE: 22
PIF_VALUE: 21
PIF_VALUE: 22
PIF_VALUE: 3
PIF_VALUE: 19
PIF_VALUE: 3
PIF_VALUE: 19
PIF_VALUE: 19
PIF_VALUE: 22
PIF_VALUE: 19
PIF_VALUE: 20
PIF_VALUE: 0
PIF_VALUE: 22
PIF_VALUE: 0
PIF_VALUE: 20
PIF_VALUE: 20
PIF_VALUE: 24
PIF_VALUE: 22
PIF_VALUE: 22
PIF_VALUE: 16
PIF_VALUE: 22
PIF_VALUE: 20
PIF_VALUE: 22
PIF_VALUE: 22
PIF_VALUE: 21
PIF_VALUE: 22
PIF_VALUE: 20
PIF_VALUE: 22
PIF_VALUE: 22
PIF_VALUE: 0
PIF_VALUE: 0
PIF_VALUE: 22
PIF_VALUE: 28
PIF_VALUE: 21
PIF_VALUE: 18
PIF_VALUE: 23
PIF_VALUE: 23
PIF_VALUE: 22
PIF_VALUE: 22
PIF_VALUE: 19
PIF_VALUE: 22
PIF_VALUE: 1
PIF_VALUE: 19
PIF_VALUE: 21
PIF_VALUE: 21
PIF_VALUE: 3
PIF_VALUE: 24
PIF_VALUE: 22
PIF_VALUE: 19
PIF_VALUE: 21
PIF_VALUE: 21
PIF_VALUE: 20
PIF_VALUE: 22
PIF_VALUE: 22
PIF_VALUE: 20
PIF_VALUE: 21
PIF_VALUE: 20
PIF_VALUE: 21
PIF_VALUE: 23
PIF_VALUE: 22
PIF_VALUE: 20
PIF_VALUE: 21
PIF_VALUE: 0
PIF_VALUE: 1
PIF_VALUE: 22
PIF_VALUE: 23
PIF_VALUE: 0
PIF_VALUE: 22
PIF_VALUE: 21
PIF_VALUE: 21
PIF_VALUE: 20
PIF_VALUE: 22
PIF_VALUE: 16
PIF_VALUE: 21
PIF_VALUE: 20
PIF_VALUE: 21
PIF_VALUE: 22
PIF_VALUE: 21
PIF_VALUE: 21
PIF_VALUE: 1
PIF_VALUE: 23
PIF_VALUE: 22
PIF_VALUE: 21
PIF_VALUE: 18
PIF_VALUE: 16
PIF_VALUE: 22
PIF_VALUE: 23
PIF_VALUE: 22
PIF_VALUE: 23
PIF_VALUE: 23
PIF_VALUE: 21
PIF_VALUE: 20
PIF_VALUE: 19
PIF_VALUE: 22
PIF_VALUE: 23
PIF_VALUE: 19
PIF_VALUE: 22
PIF_VALUE: 0
PIF_VALUE: 0
PIF_VALUE: 20
PIF_VALUE: 19
PIF_VALUE: 22
PIF_VALUE: 19
PIF_VALUE: 22
PIF_VALUE: 24
PIF_VALUE: 22
PIF_VALUE: 0
PIF_VALUE: 23
PIF_VALUE: 31
PIF_VALUE: 22

## 2020-09-18 ASSESSMENT — PAIN DESCRIPTION - DESCRIPTORS
DESCRIPTORS: ACHING;BURNING;CRAMPING
DESCRIPTORS: ACHING;CONSTANT;NAGGING;SORE
DESCRIPTORS: ACHING;CONSTANT;DISCOMFORT

## 2020-09-18 ASSESSMENT — PAIN DESCRIPTION - FREQUENCY
FREQUENCY: CONTINUOUS

## 2020-09-18 ASSESSMENT — PAIN DESCRIPTION - PROGRESSION
CLINICAL_PROGRESSION: NOT CHANGED

## 2020-09-18 ASSESSMENT — PAIN DESCRIPTION - LOCATION
LOCATION: BACK;BUTTOCKS
LOCATION: BACK;BUTTOCKS
LOCATION: BACK;BUTTOCKS;NECK
LOCATION: BACK;BUTTOCKS
LOCATION: ABDOMEN

## 2020-09-18 ASSESSMENT — PAIN - FUNCTIONAL ASSESSMENT
PAIN_FUNCTIONAL_ASSESSMENT: PREVENTS OR INTERFERES SOME ACTIVE ACTIVITIES AND ADLS

## 2020-09-18 ASSESSMENT — PAIN DESCRIPTION - ONSET
ONSET: ON-GOING
ONSET: PROGRESSIVE

## 2020-09-18 ASSESSMENT — PAIN DESCRIPTION - PAIN TYPE
TYPE: CHRONIC PAIN

## 2020-09-18 ASSESSMENT — PAIN DESCRIPTION - ORIENTATION: ORIENTATION: MID;RIGHT

## 2020-09-18 NOTE — PLAN OF CARE
Problem: Falls - Risk of:  Goal: Will remain free from falls  Description: Will remain free from falls  9/18/2020 1053 by Jessica Knapp  Outcome: Met This Shift  9/17/2020 2345 by Salima Moore RN  Outcome: Not Met This Shift  Goal: Absence of physical injury  Description: Absence of physical injury  9/18/2020 1053 by Jessica Knapp  Outcome: Met This Shift  9/17/2020 2345 by Salima Moore RN  Outcome: Not Met This Shift     Problem: Skin Integrity:  Goal: Will show no infection signs and symptoms  Description: Will show no infection signs and symptoms  Outcome: Met This Shift  Goal: Absence of new skin breakdown  Description: Absence of new skin breakdown  Outcome: Met This Shift     Problem: Pain:  Description: Pain management should include both nonpharmacologic and pharmacologic interventions.   Goal: Pain level will decrease  Description: Pain level will decrease  Outcome: Met This Shift  Goal: Control of acute pain  Description: Control of acute pain  Outcome: Met This Shift  Goal: Control of chronic pain  Description: Control of chronic pain  Outcome: Met This Shift

## 2020-09-18 NOTE — PROGRESS NOTES
([facility] [address]    Intra-abdominal infection  Resolved Problems:    * No resolved hospital problems.  *      Plan   GI:  NPO planning on possible OR today   Neuro:  Cymbalta, prn dilaudid   Renal: 125cc LR, Monitor Urine Output, Daily CBC,BMP, Mg,Phos, ionized Ca , replace Mg  Came in with a lactic acidosis of 4.2 down to 1.0   Musculoskeletal: WBAT all extremities ,  AM-PAC Score pending   Pulmonary: Aggressive pulmonary hygiene , SMI , Monitor RR and Maintain SpO2 > 92%  ID: [email or web address]   Monitor leukocytosis and Monitor Fever Curve  Heme: No indication for Transfusion   Cardiac: Monitor Hemodynamics Home metoprolol , statin   Endocrine: Hold  metformin, SSI       DVT Prophylaxis: PCDs, SQ Lovenox   Ulcer Prophylaxis: Home PPI   Tubes and Lines: Continue Central Line   Seizure proph:     No Indication  Ancillary consults:   PT/OT    Family Update:         As available   CODE Status:       Full Code    Dispo: GHADA George MD    Critical Care: 32 minutes evaluating and managing patient with Sepsis

## 2020-09-18 NOTE — H&P
GENERAL SURGERY  HISTORY AND PHYSICAL  9/17/2020    Chief Complaint   Patient presents with    Dizziness     from NH, dizziness today, have a JULIO drain that is draining his gallbladder. CARSON Barrett is a 53 y.o. male has a complex recent medical history. Back in June 2020 he most likely was in diabetic ketoacidosis. Over the course of that hospitalization he had 3 myocardial infarctions and was found to have multiple pulmonary emboli. Patient also has a past surgical history of a right inguinal hernia repair with Dr. Musa De La Cruz back in December 2019. Patient was here at [facility] at the beginning of August of this year. Patient was admitted to the MICU was septic either from a gallbladder or possible splenic abscesses. At that time interventional radiology was consulted and they gave him a percutaneous cholecystostomy tube. He recovered and was discharged home. Earlier this month in September he came in again with anemia and a GI bleed. He had an EGD on 9/4 with Dr. Vince Carrasco which showed gastritis, and a colonoscopy on 9/5 with Dr. 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 Patient discontinued his novel Xa inhibitor at that time and was given a vena cava filter. Patient has had melena since this time. Today he states that he became extremely short of breath and dizzy. He still admits to melena but shows no signs of bowel obstruction. He is urinating appropriately. ED work-up shows that he is septic- he is tachycardic in the 120s, on 5 L nasal cannula, has an SIMÓN with a creatinine of 1.5 from a baseline of 1.0, has a lactic acidosis of 4.1, and white blood cell count of 17.3.       Past Medical History:   Diagnosis Date    Accident 11/2019    stepped on nail rt ft about 1 month ago- healed per patient    SIMÓN (acute kidney injury) ([facility] [address] 2008 apx    kidney bruised due to fall / Sue Flavia    Allergic rhinitis     Chronic back pain     Depression     Diabetes mellitus ([address]     Difficulty sleeping     at times    Displacement of lumbar intervertebral disc without myelopathy     Fibromyalgia     Fractured rib     2008 / healed    H/O seasonal allergies     Head injury 1980'S apx    no residual s/s    Hyperlipidemia     Hypertension     Obesity (BMI 35.0-39.9 without comorbidity)     bmi 39.2  weight 296 #    Osteoarthritis     Thoracic or lumbosacral neuritis or radiculitis, unspecified        Past Surgical History:   Procedure Laterality Date    COLONOSCOPY N/A 9/5/2020    COLONOSCOPY WITH BIOPSY performed by Ny He MD at [address] [facility] CT PTC NEW ACCESS  8/3/2020    CT PTC NEW ACCESS 8/3/2020 [facility] CT    FOOT SURGERY Right 1985    to treat shattered bones    HERNIA REPAIR  2001    DOUBLE HERNIA    HERNIA REPAIR Right 12/9/2019    LAPAROSCOPIC RIGHT INGUINAL HERNIA REPAIR, MESH 10x15 cm PLACEMENT performed by Jules Martin MD at [facility] Left 4/11/2016    NECK SURGERY  2000    FUSION    NV COLONOSCOPY FLX DX W/COLLJ SPEC WHEN PFRMD N/A 5/7/2018    COLONOSCOPY DIAGNOSTIC performed by Sadie Mason MD at [facility] Left 2014    Dr. Ramirez Render ARTHROSCOPY Left 11 21 14    SHOULDER SURGERY  2001 &2007    RIGHT AND LEFT repair of tears    TOTAL HIP ARTHROPLASTY Right 10/31/2016    Total right hip  Emmy Hdz MD    UPPER GASTROINTESTINAL ENDOSCOPY N/A 9/4/2020    EGD DIAGNOSTIC ONLY performed by Anaid Flores MD at [facility] 1 2007    LEFT WRIST       Prior to Admission medications    Medication Sig Start Date End Date Taking? Authorizing Provider   metoprolol tartrate (LOPRESSOR) 25 MG tablet Take 1 tablet by mouth 2 times daily 9/11/20   Naniroma Raya MD   metFORMIN (GLUCOPHAGE) 500 MG tablet Take 1 tablet by mouth 2 times daily (with meals) 9/11/20   Nani BUZZ Mcgregor MD   pregabalin (LYRICA) 300 MG capsule Take 300 mg by mouth 2 times daily. Historical Provider, MD   melatonin 3 MG TABS tablet Take 3 mg by mouth nightly as needed (sleep)    Historical Provider, MD   fluticasone (FLONASE) 50 MCG/ACT nasal spray 2 sprays by Nasal route daily    Historical Provider, MD   cyclobenzaprine (FLEXERIL) 10 MG tablet Take 10 mg by mouth three times daily    Historical Provider, MD   fenofibrate (TRICOR) 54 MG tablet Take 54 mg by mouth daily    Historical Provider, MD   ferrous sulfate (IRON 325) 325 (65 Fe) MG tablet Take 325 mg by mouth 2 times daily    Historical Provider, MD   hydroxyurea (HYDREA) 500 MG chemo capsule Take 500 mg by mouth 2 times daily    Historical Provider, MD   loperamide (IMODIUM) 2 MG capsule Take 2 mg by mouth 4 times daily as needed for Diarrhea    Historical Provider, MD   nystatin (MYCOSTATIN) 246475 UNIT/GM powder Apply topically 2 times daily    Historical Provider, MD   insulin lispro (HUMALOG) 100 UNIT/ML injection vial Inject into the skin 3 times daily (before meals) *Plus Sliding Scale*    Historical Provider, MD   pantoprazole (PROTONIX) 40 MG tablet Take 40 mg by mouth daily    Historical Provider, MD   DULoxetine (CYMBALTA) 30 MG extended release capsule Take 1 capsule by mouth daily 5/14/20   Florence Holcomb, APRN - CNP   pravastatin (PRAVACHOL) 20 MG tablet Take 1 tablet by mouth nightly 5/14/20   Florence Holcomb, APRN - CNP       Allergies   Allergen Reactions    Seasonal      HAYFEVER / sneezing,watery eyes    Tramadol Itching       Family History   Problem Relation Age of Onset    Hypertension Mother     Arthritis Father     Diabetes Father     Cancer Maternal Grandfather         Skin    Cancer Paternal Uncle         skin    Diabetes Paternal Grandfather     Heart Disease Maternal Grandmother     Stroke Maternal Aunt        Social History     Tobacco Use    Smoking status: Never Smoker    Smokeless tobacco: Current User     Types: Chew   Substance Use Topics    Alcohol use: Not Currently     Alcohol/week: 0.0 standard drinks     Frequency: Monthly or less    Drug use: No         Review of Systems - History obtained from the patient  General ROS: positive for  - fatigue  Psychological ROS: negative  Ophthalmic ROS: negative  ENT ROS: negative  Allergy and Immunology ROS: negative  Hematological and Lymphatic ROS: positive for - bleeding problems  Endocrine ROS: negative  Respiratory ROS: positive for - shortness of breath  Cardiovascular ROS: no chest pain or dyspnea on exertion  Gastrointestinal ROS: Abdominal pain, melena  Genito-Urinary ROS: no dysuria, trouble voiding, or hematuria  Musculoskeletal ROS: negative  Neurological ROS: no TIA or stroke symptoms  Dermatological ROS: negative      PHYSICAL EXAM:    Vitals:    09/17/20 1900   BP: 98/72   Pulse: 119   Resp: 17   Temp: 98.2 °F (36.8 °C)   SpO2: 98%       General Appearance:  awake, alert, oriented, in no acute distress  Skin:  Skin color, texture, turgor normal. No rashes or lesions. Head/face:  NCAT  Eyes:  PERRL and Sclera nonicteric  Lungs:  Normal expansion. Clear to auscultation. No rales, rhonchi, or wheezing. Heart:  Heart sounds are normal.  Regular rate and rhythm without murmur, gallop or rub. Abdomen: Soft, nondistended, minimal tenderness to palpation in the midline of his abdomen, no rigidity rebound or guarding  Extremities: Extremities warm to touch, pink, with no edema. Neurologic:  Gait normal. Reflexes normal and symmetric. Sensation grossly intact. Male Rectal:  Stool: Guiac positive    LABS:  CBC  Recent Labs     09/17/20  1440   WBC 17.3*   HGB 10.0*   HCT 32.3*   *     BMP  Recent Labs     09/17/20  1440      K 4.5   CL 98   CO2 21*   BUN 17   CREATININE 1.5*   CALCIUM 9.5     Liver Function  Recent Labs     09/17/20  1440   LIPASE 6*   BILITOT <0.2   BILIDIR <0.2   AST 15   ALT 13   ALKPHOS 143*   PROT 6.9   LABALBU 3.4*     No results for input(s): LACTATE in the last 72 hours.   Recent Labs     09/17/20  1440 INR 1.2       RADIOLOGY    Ct Abdomen Pelvis W Iv Contrast Additional Contrast? None    Result Date: 2020  Patient MRN:  [id number] : 1975 Age: 53 years Gender: Male Order Date:  2020 4:51 PM EXAM: CT ABDOMEN PELVIS W IV CONTRAST NUMBER OF IMAGES \ views:  078 INDICATION: Dizziness, abdominal pain COMPARISON: Contemporaneous CTA chest study was performed, CT abdomen pelvis with contrast 7794 TECHNIQUE: Helical imaging was extended from the lower chest to the lower pelvis in conjunction with the intravenous administration of 90 mL of Isovue-370, and axial images were supplemented with sagittal and coronal MPR images. Low-dose CT  acquisition technique included one of following options; 1 . Automated exposure control, 2. Adjustment of MA and or KV according to patient's size or 3. Use of iterative reconstruction. FINDINGS: CHEST: Contemporaneous CTA imaging documented bilateral pulmonary emboli without right heart strain or acute pulmonary or pleural complications. LIVER: The liver is uniform in parenchymal density, and no focal lesions are identified. There is diffuse fatty change. GALLBLADDER AND BILIARY TREE: There is a pigtail drainage catheter in the gallbladder fossa, and it is uncertain if this is a post cholecystectomy drain or if this is a cholecystostomy associated with complete collapse of the gallbladder. There is no biliary ductal ectasia. SPLEEN: A circumscribed hypodense structure in the splenic bed could be a hypodense or infarcted spleen, but shows no evidence of extracapsular fluid or inflammatory change. The finding measures about 10 cm length by about 7 cm in thickness. It is uncertain if this represents residual following splenectomy, but there is no mention in the electronic record of splenectomy. . ADRENALS:  The adrenals are normal in appearance bilaterally. PANCREAS:The pancreas shows no evidence of acute inflammation or mass lesions.  KIDNEYS/BLADDER: The kidneys show uniform cortical enhancement and no evidence of outflow obstruction. There is no perinephric inflammatory change. Ureters are similar in course and caliber, and the bladder is normal in appearance. APPENDIX:  Normal BOWEL:  There is no evidence of inflammation, obstruction, or perforation of enteric structures. There is some fluid filled distal ileum, and the previously identified thickening of jejunal loops in the left upper abdomen is again documented. The appearance suggests enteritis with focal stenosis in the anterior right paramedian mid abdomen suggesting a focal stricture. There is no evidence of fistula formation. Crohn's disease would be a differential diagnostic consideration. PELVIS:  There is no pelvic adenopathy or dependent free pelvic fluid. Bryant Jase LYMPHATICS: Numerous small lymph nodes are clustered about the small bowel mesenteric root, and there are a few borderline prominent lymph nodes beneath the renal vascular pedicles. VASCULAR: There is no evidence of acute vascular pathology involving mesenteric or retroperitoneal great vessels. There is an inferior vena cava filter. SKELETAL: Mild levoscoliotic curvature and degenerative changes of the lumbar spine are noted. There are bilateral hip arthroplasty elements without acute complications. There is persistent small bowel mural thickening in the midabdomen involving the jejunum and proximal to mid ileum, with an apparent stenosis or focal stricture at about the level of the renal vascular pedicles and inferior vena cava filter in the right paramedian anterior abdomen. There is no evidence of perforation or obstruction, however. There is a pigtail drainage catheter in the gallbladder fossa, which could be a cholecystostomy catheter or a drainage catheter in the gallbladder fossa following cholecystectomy. The gallbladder is present, it is completely collapsed.  A cystic appearing structure is noted in the splenic bed, and could be a splenic remnant or a low-density spleen, not prominently enlarged. Xr Chest Portable    Result Date: 2020  Single frontal projection (one view) of the chest. COMPARISON: 2020 FINDINGS: The heart, lungs, mediastinum and regional skeleton are unremarkable. The costophrenic angles are sharp and clear. There is no evidence of mediastinal shift. The heart is not enlarged. There is no evidence of hilar adenopathy. Lungs are negative for evidence of acute airspace consolidation, effusion, atelectasis, pneumothorax, pathologic nodularity or interstitial thickening. Regional bone density and trabecular pattern are normal.     Negative one view chest.    Cta Pulmonary W Contrast    Result Date: 2020  Patient MRN:  [id number] : 1975 Age: 53 years Gender: Male Order Date:  2020 4:51 PM EXAM: CTA PULMONARY W CONTRAST NUMBER OF IMAGES:  480 INDICATION: Dizziness, cholecystostomy drain COMPARISON: None Technique: Low-dose CT  acquisition technique included one of following options; 1 . Automated exposure control, 2. Adjustment of MA and or KV according to patient's size or 3. Use of iterative reconstruction. Contiguous spiral images were obtained in the axial plane, following the administration of 90 mL of Isovue-370 intravenous contrast using CT angiographic protocol. Sagittal and coronal images were reconstructed from the axial plane acquisition. Addition MIP reconstructions were presented to aid in the interpretation of this study. Findings: The central pulmonary arterial tree shows acute emboli in the descending pulmonary arteries bilaterally, but no areas of regional oligemia, pleural effusion, or peripheral infarction are identified. There is no evidence of right heart strain. Lung window images show no evidence of organized pneumonia or mass lesions. Soft tissue window images show no evidence of mediastinal adenopathy, and the thoracic aorta shows no acute complications.  Bone window images show no acute chest wall traumatic findings. Upper abdominal images show no evidence of acute visceral pathology. Diffuse fatty change of the liver is noted. The patient appears to be post cholecystectomy with a pigtail drain in the gallbladder fossa. The adrenals, kidneys, and included bowel structures are unremarkable for some mural thickening of small bowel in the left upper abdomen that could indicate mild colitis. Diverticuli are noted in the left hemicolon. Bilateral central and dependent pulmonary emboli without peripheral infarcts No evidence of pneumonia, pulmonary mass lesions, or cardiac decompensation. Fatty change of the liver, and there is a pigtail drainage catheter in the gallbladder fossa. Upper abdominal bowel loops show some mural thickening, which is nonspecific, but could indicate enteritis. ALERT:  THIS IS AN ABNORMAL REPORT-bilateral lower lobe central acute pulmonary emboli without complicating right heart strain, pleural effusion, or pulmonary infarcts. 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 This is the prime suspect as a source for his septic picture.   High concern for Crohn's disease    PLAN:    Admit to SICU  Continue to resuscitate with IV fluids  IV Zosyn  Every 6 hours lactic acid   Plans for exploratory laparotomy, possible cholecystectomy on 9/18 with Dr. Michael Caputo    Electronically signed by Yaima Elias MD on 9/17/20 at 8:54 PM EDT

## 2020-09-18 NOTE — PROGRESS NOTES
Hospitalist Progress Note      SYNOPSIS: Patient admitted on 2020 presented with abdominal pain, bleeding from rectum, and dizziness. He does admit to some shortness of breath that also started day of presentation that is associated with exertion. He denies any chest pain. He was recently admitted previously for anemia from GI bleed due to recently diagnosed colon mass. He has a history of bilateral pulmonary embolism and IVC filter was recently placed in the setting of blood clots with recurrent bleeding. He also has JULIO drain from being previously admitted to MICU with sepsis from gallbladder or splenic abscess. Patient admitted to SICU for sepsis and abdominal infection    SUBJECTIVE:  Stable overnight. No other overnight issues reported. Patient seen and examined  Records reviewed. Remains tachycardic  Hemoglobin dropped from 10 to 7.9  Ex lap with SBR possible ileocecectomy for friable mass seen on colonoscopy today. Will evaluate for cholecystectomy at this time. Temp (24hrs), Av °F (37.2 °C), Min:97.6 °F (36.4 °C), Max:100.4 °F (38 °C)    DIET: Diet NPO, After Midnight  CODE: Full Code    Intake/Output Summary (Last 24 hours) at 2020 0711  Last data filed at 2020 0700  Gross per 24 hour   Intake 1917 ml   Output 815 ml   Net 1102 ml       Review of Systems  All bolded are positive; please see HPI  General:  Fever, chills, diaphoresis, fatigue, malaise, night sweats, weight loss  Psychological:  Anxiety, disorientation, hallucinations. ENT:  Epistaxis, headaches, vertigo, visual changes. Cardiovascular:  Chest pain, irregular heartbeats, palpitations, paroxysmal nocturnal dyspnea. Respiratory:  Shortness of breath, coughing, sputum production, hemoptysis, wheezing, orthopnea.   Gastrointestinal:  Nausea, vomiting, diarrhea, heartburn, constipation, abdominal pain, hematemesis, hematochezia, melena, acholic stools  Genito-Urinary:  Dysuria, urgency, frequency, hematuria  Musculoskeletal:  Joint pain, joint stiffness, joint swelling, muscle pain  Neurology:  Headache, focal neurological deficits, weakness, numbness, paresthesia  Derm:  Rashes, ulcers, excoriations, bruising  Extremities:  Decreased ROM, peripheral edema, mottling      OBJECTIVE:     /63   Pulse 118   Temp 99.5 °F (37.5 °C) (Bladder)   Resp 17   Ht 6' (1.829 m)   Wt 252 lb 13.9 oz (114.7 kg)   SpO2 98%   BMI 34.29 kg/m²   General Appearance:  awake, alert, and oriented to person, place, time, and purpose; appears stated age and cooperative; no apparent distress no labored breathing  HEENT:  NCAT; PERRL; conjunctiva pink, sclera clear  Neck:  no adenopathy, bruit, JVD, tenderness, masses, or nodules; supple, symmetrical, trachea midline, thyroid not enlarged  Lung:  clear to auscultation bilaterally; no use of accessory muscles; no rhonchi, rales, or crackles  Heart:  tachycardic without murmur, rub, or gallop  Abdomen:  soft, tender, mildly distended, +cholecystostomy tube, ; normoactive bowel sounds; no organomegaly  Extremities:  extremities normal, atraumatic, no cyanosis +edema  Musculokeletal:  no joint swelling, no muscle tenderness. ROM normal in all joints of extremities. 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 possible Crohns-General surgery following. Zosyn. Ex lap with SBR possible ileocecectomy for friable mass seen on colonoscopy today. Will evaluate for cholecystectomy at this time. · Acute acalculous cholecystitis- surgery following. With cholecystostomy tube in place  · Acute anemia secondary to GI bleed- hemoglobin dropped to 7.9 today. Type and screen.  Transfuse <7  · Known bilateral pulmonary embolism s/p IVC filter placement due to recurrent bleeding  · Hypertension- Currently borderline hypotensive  · Type 2 diabetes mellitus- Sliding scale insulin  · SIMÓN, resolved  · Hypomagnesemia- Replace         DISPOSITION: Continue current plan of care    Medications:  REVIEWED DAILY    Infusion Medications    lactated ringers 125 mL/hr at 09/17/20 2346     Scheduled Medications    piperacillin-tazobactam  3.375 g Intravenous Q8H    DULoxetine  30 mg Oral Daily    fluticasone  2 spray Nasal Daily    sodium chloride   Intravenous Q8H    metoprolol tartrate  25 mg Oral BID    sodium chloride flush  10 mL Intravenous 2 times per day    heparin (porcine)  5,000 Units Subcutaneous 3 times per day    insulin lispro  0-12 Units Subcutaneous Q6H    pantoprazole  40 mg Intravenous Daily     PRN Meds: acetaminophen **OR** acetaminophen, sodium chloride flush, [DISCONTINUED] promethazine **OR** ondansetron, HYDROmorphone **OR** HYDROmorphone    Labs:     Recent Labs     09/17/20  1440 09/18/20  0445   WBC 17.3* 10.6   HGB 10.0* 7.9*   HCT 32.3* 24.8*   * 479*       Recent Labs     09/17/20  1440 09/18/20  0445    134   K 4.5 4.3   CL 98 100   CO2 21* 20*   BUN 17 17   CREATININE 1.5* 1.0   CALCIUM 9.5 8.5*   PHOS  --  4.2       Recent Labs     09/17/20  1440 09/18/20  0445   PROT 6.9 5.8*   ALKPHOS 143* 118   ALT 13 7   AST 15 9   BILITOT <0.2 <0.2   LIPASE 6*  --        Recent Labs     09/17/20  1440   INR 1.2       Recent Labs     09/17/20  1440   TROPONINI 0.03       Chronic labs:    Lab Results   Component Value Date    CHOL 94 09/18/2020    TRIG 151 (H) 09/18/2020    HDL 33 09/18/2020    LDLCALC 31 09/18/2020    TSH 0.833 09/18/2020    INR 1.2 09/17/2020    LABA1C 5.5 09/18/2020       Radiology: REVIEWED DAILY    +++++++++++++++++++++++++++++++++++++++++++++++++  Jessica Mittal DO  [employer] - 2020 [facility], [place]  +++++++++++++++++++++++++++++++++++++++++++++++++  NOTE: This report was

## 2020-09-18 NOTE — PROCEDURES
available  Patient identity confirmed: arm band  Time out: Immediately prior to procedure a \"time out\" was called to verify the correct patient, procedure, equipment, support staff and site/side marked as required. Indications: vascular access  Anesthesia: local infiltration    Anesthesia:  Local Anesthetic: lidocaine 1% without epinephrine  Anesthetic total: 5 mL    Sedation:  Patient sedated: no    Preparation: skin prepped with 2% chlorhexidine  Skin prep agent dried: skin prep agent completely dried prior to procedure  Sterile barriers: all five maximum sterile barriers used - cap, mask, sterile gown, sterile gloves, and large sterile sheet  Hand hygiene: hand hygiene performed prior to central venous catheter insertion  Location details: right internal jugular  Patient position: flat  Catheter type: triple lumen  Catheter size: 7 Fr  Pre-procedure: landmarks identified  Ultrasound guidance: yes  Sterile ultrasound techniques: sterile gel and sterile probe covers were used  Number of attempts: 1  Successful placement: yes  Post-procedure: line sutured and dressing applied  Assessment: blood return through all ports and free fluid flow  Patient tolerance: Patient tolerated the procedure well with no immediate complications  Comments: Dr. Alek Witt was immediately available for this procedure.           Annabelle Rivers MD  9/17/2020

## 2020-09-18 NOTE — PROCEDURES
Vidhya Borges is a 53 y.o. male patient. 1. Septicemia ([facility]    2. SIMÓN (acute kidney injury) ([facility] [facility]    3. Dehydration    4. Anemia, unspecified type    5. Abdominal pain, unspecified abdominal location    6. Leukocytosis, unspecified type    7. Elevated lactic acid level      Past Medical History:   Diagnosis Date    Accident 11/2019    stepped on nail rt ft about 1 month ago- healed per patient    SIMÓN (acute kidney injury) ([facility] [facility] 2008 apx    kidney bruised due to fall / Irion Sero    Allergic rhinitis     Chronic back pain     Depression     Diabetes mellitus ([facility] [address] [facility]     Difficulty sleeping     at times    Displacement of lumbar intervertebral disc without myelopathy     Fibromyalgia     Fractured rib     2008 / healed    H/O seasonal allergies     Head injury 1980'S apx    no residual s/s    Hyperlipidemia     Hypertension     Obesity (BMI 35.0-39.9 without comorbidity)     bmi 39.2  weight 296 #    Osteoarthritis     Thoracic or lumbosacral neuritis or radiculitis, unspecified      Blood pressure 105/76, pulse 133, temperature 100.2 °F (37.9 °C), temperature source Oral, resp. rate 24, height 6' (1.829 m), weight 247 lb 12.8 oz (112.4 kg), SpO2 97 %. Insert Arterial Line    Date/Time: 9/17/2020 11:42 PM  Performed by: Fady Mohan MD  Authorized by: Phong Leon MD   Consent: Verbal consent obtained.   Risks and benefits: risks, benefits and alternatives were discussed  Consent given by: patient  Patient understanding: patient states understanding of the procedure being performed  Patient consent: the patient's understanding of the procedure matches consent given  Procedure consent: procedure consent matches procedure scheduled  Relevant documents: relevant documents present and verified  Test results: test results available and properly labeled  Required items: required blood products, implants, devices, and special equipment available  Patient identity confirmed: arm band  Time out: Immediately prior to procedure a \"time out\" was called to verify the correct patient, procedure, equipment, support staff and site/side marked as required. Preparation: Patient was prepped and draped in the usual sterile fashion. Indications: hemodynamic monitoring  Location: right femoral  Anesthesia: local infiltration    Anesthesia:  Local Anesthetic: lidocaine 1% without epinephrine  Anesthetic total: 5 mL  Needle gauge: 20  Seldinger technique: Seldinger technique used  Cutdown: cutdown required  Number of attempts: 1  Post-procedure: line sutured and dressing applied  Post-procedure CMS: normal  Patient tolerance: Patient tolerated the procedure well with no immediate complications  Comments: Dr. Steve Kimball was immediately available for this procedure.           Kristofer Zaragoza MD  9/17/2020

## 2020-09-18 NOTE — CARE COORDINATION
Pt was readmitted from [facility] d/t new onset of dizziness and sob. Pt states these symptoms were not present when he discharged on 9/11. Has cholecystostomy tube in place. Per Jocelyn at [address], pt is a bed hold, but will need ins auth to return. Will also need covid test prior to discharge. Pt going to OR today. Envelope, ambulance form and face sheet placed in soft chart.

## 2020-09-18 NOTE — TELEPHONE ENCOUNTER
The patient is currently admitted to [facility].   Electronically signed by Elsa Lim on 9/18/2020 at 9:23 AM

## 2020-09-18 NOTE — PLAN OF CARE
Problem: Falls - Risk of:  Goal: Will remain free from falls  Description: Will remain free from falls  9/18/2020 1618 by Narda Jeffrey  Outcome: Met This Shift  9/18/2020 1053 by Narda Jeffrey  Outcome: Met This Shift  Goal: Absence of physical injury  Description: Absence of physical injury  9/18/2020 1618 by Narda Jeffrey  Outcome: Met This Shift  9/18/2020 1053 by Narda Jeffrey  Outcome: Met This Shift     Problem: Skin Integrity:  Goal: Will show no infection signs and symptoms  Description: Will show no infection signs and symptoms  9/18/2020 1618 by Narda Jeffrey  Outcome: Met This Shift  9/18/2020 1053 by Narda Jeffrey  Outcome: Met This Shift  Goal: Absence of new skin breakdown  Description: Absence of new skin breakdown  9/18/2020 1618 by Narda Jeffrey  Outcome: Met This Shift  9/18/2020 1053 by Narda Jeffrey  Outcome: Met This Shift     Problem: Pain:  Description: Pain management should include both nonpharmacologic and pharmacologic interventions.   Goal: Pain level will decrease  Description: Pain level will decrease  9/18/2020 1618 by Narda Jeffrey  Outcome: Met This Shift  9/18/2020 1053 by Narda Jeffrey  Outcome: Met This Shift  Goal: Control of acute pain  Description: Control of acute pain  9/18/2020 1618 by Narda Jeffrey  Outcome: Met This Shift  9/18/2020 1053 by Narda Jeffrey  Outcome: Met This Shift  Goal: Control of chronic pain  Description: Control of chronic pain  9/18/2020 1618 by Narda Jeffrey  Outcome: Met This Shift  9/18/2020 1053 by Narda Jeffrey  Outcome: Met This Shift

## 2020-09-18 NOTE — ANESTHESIA PRE PROCEDURE
Department of Anesthesiology  Preprocedure Note       Name:  Vidhya Borges   Age:  53 y.o.  :  1975                                          MRN:  [id number]         Date:  2020      Surgeon: Alexia Thapa):  Horacio Smith MD    Procedure: Procedure(s):  LAPAROTOMY EXPLORATORY, POSSIBLE BOWEL RESECTION,POSSIBLE CHOLECYSTECTOMY    Medications prior to admission:   Prior to Admission medications    Medication Sig Start Date End Date Taking? Authorizing Provider   metoprolol tartrate (LOPRESSOR) 25 MG tablet Take 1 tablet by mouth 2 times daily 20   Nani Herrera MD   metFORMIN (GLUCOPHAGE) 500 MG tablet Take 1 tablet by mouth 2 times daily (with meals) 20   Nani Mcgregor MD   pregabalin (LYRICA) 300 MG capsule Take 300 mg by mouth 2 times daily.     Historical Provider, MD   melatonin 3 MG TABS tablet Take 3 mg by mouth nightly as needed (sleep)    Historical Provider, MD   fluticasone (FLONASE) 50 MCG/ACT nasal spray 2 sprays by Nasal route daily    Historical Provider, MD   cyclobenzaprine (FLEXERIL) 10 MG tablet Take 10 mg by mouth three times daily    Historical Provider, MD   fenofibrate (TRICOR) 54 MG tablet Take 54 mg by mouth daily    Historical Provider, MD   ferrous sulfate (IRON 325) 325 (65 Fe) MG tablet Take 325 mg by mouth 2 times daily    Historical Provider, MD   hydroxyurea (HYDREA) 500 MG chemo capsule Take 500 mg by mouth 2 times daily    Historical Provider, MD   loperamide (IMODIUM) 2 MG capsule Take 2 mg by mouth 4 times daily as needed for Diarrhea    Historical Provider, MD   nystatin (MYCOSTATIN) 686023 UNIT/GM powder Apply topically 2 times daily    Historical Provider, MD   insulin lispro (HUMALOG) 100 UNIT/ML injection vial Inject into the skin 3 times daily (before meals) *Plus Sliding Scale*    Historical Provider, MD   pantoprazole (PROTONIX) 40 MG tablet Take 40 mg by mouth daily    Historical Provider, MD   DULoxetine (CYMBALTA) 30 MG extended release capsule Take 1 capsule by mouth daily 5/14/20   IGOR Gray CNP   pravastatin (PRAVACHOL) 20 MG tablet Take 1 tablet by mouth nightly 5/14/20   IGOR Gray CNP       Current medications:    Current Facility-Administered Medications   Medication Dose Route Frequency Provider Last Rate Last Dose    magnesium sulfate 4 g in 100 mL IVPB premix  4 g Intravenous Once Ambreen Esposito MD 25 mL/hr at 09/18/20 1048 4 g at 09/18/20 1048    enoxaparin (LOVENOX) injection 40 mg  40 mg Subcutaneous Daily Ambreen Esposito MD   Stopped at 09/18/20 1203    pravastatin (PRAVACHOL) tablet 20 mg  20 mg Oral Nightly Ambreen Esposito MD        acetaminophen (TYLENOL) tablet 650 mg  650 mg Oral Q6H  N Lawrence, DO        Or    acetaminophen (TYLENOL) suppository 650 mg  650 mg Rectal Q6H PRN Leigh Yeung DO        piperacillin-tazobactam (ZOSYN) 3.375 g in dextrose 5 % 100 mL IVPB extended infusion (mini-bag)  3.375 g Intravenous Q8H Geovanna Castillo MD 25 mL/hr at 09/18/20 1209 3.375 g at 09/18/20 1209    DULoxetine (CYMBALTA) extended release capsule 30 mg  30 mg Oral Daily [address], DO   Stopped at 09/18/20 0855    fluticasone (FLONASE) 50 MCG/ACT nasal spray 2 spray  2 spray Nasal Daily Lamin Yeung DO   2 spray at 09/18/20 0856    0.9 % sodium chloride infusion admixture   Intravenous Q8H Jeri Wiseman DO 12.5 mL/hr at 09/18/20 0830      metoprolol tartrate (LOPRESSOR) tablet 25 mg  25 mg Oral BID Geovanna Castillo MD   Stopped at 09/18/20 0859    sodium chloride flush 0.9 % injection 10 mL  10 mL Intravenous 2 times per day Geovanna Castillo MD   10 mL at 09/18/20 0857    sodium chloride flush 0.9 % injection 10 mL  10 mL Intravenous PRN Geovanna Castillo MD        ondansetron [facility]) injection 4 mg  4 mg Intravenous Q6H PRN Geovanna Castillo MD        lactated ringers infusion   Intravenous Continuous Ana Wang  mL/hr at 09/18/20 8920      HYDROmorphone (DILAUDID) injection 0.25 mg  0.25 mg Intravenous Q3H PRN Merlin Moctezuma MD        Or    HYDROmorphone (DILAUDID) injection 0.5 mg  0.5 mg Intravenous Q3H PRN Merlin Moctezuma MD   0.5 mg at 09/18/20 1205    insulin lispro (HUMALOG) injection vial 0-12 Units  0-12 Units Subcutaneous Q6H Tierney Romeo MD   Stopped at 09/18/20 1213    pantoprazole (PROTONIX) injection 40 mg  40 mg Intravenous Daily Tierney Romeo MD   40 mg at 09/18/20 0856       Allergies:     Allergies   Allergen Reactions    Seasonal      HAYFEVER / sneezing,watery eyes    Tramadol Itching       Problem List:    Patient Active Problem List   Diagnosis Code    Neuropathic pain M79.2    Lumbar spondylosis M47.816    Osteoarthritis M19.90    Insomnia G47.00    Fibromyalgia M79.7    Vitamin D deficiency E55.9    Essential hypertension I10    Allergic rhinitis J30.9    Lumbar radiculopathy M54.16    Osteoarthritis of spine with radiculopathy, lumbar region M47.26    Class 1 obesity in adult E66.9    Lumbar disc herniation M51.26    Type 2 diabetes mellitus ([facility] [address] E11.9    Primary osteoarthritis of left hip M16.12    Primary osteoarthritis of right hip M16.11    Cellulitis of foot L03.119    Neuropathy G62.9    Cellulitis, wound, post-operative T81.49XA    Left leg swelling M79.89    SIRS (systemic inflammatory response syndrome) ([facility]) R65.10    Cellulitis of sacral region L03.319    Rectus diastasis M62.08    Recurrent unilateral inguinal hernia K40.91    Enterocolitis K52.9    Decubitus ulcer of sacral area L89.159    Abnormal findings on diagnostic imaging of gall bladder R93.2    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    Cyst of spleen D73.4    Splenic abscess D73.3    Anemia D64.9    Other pulmonary embolism without acute cor pulmonale ([facility]) I26.99    Thrombocytosis ([facility]) D47.3    Acute GI bleeding K92.2    Acute blood loss anemia D62    SIMÓN (acute kidney injury) ([address] N17.9    Use    Smoking status: Never Smoker    Smokeless tobacco: Current User     Types: Chew   Substance Use Topics    Alcohol use: Not Currently     Alcohol/week: 0.0 standard drinks     Frequency: Monthly or less                                Ready to quit: Not Answered  Counseling given: Not Answered      Vital Signs (Current):   Vitals:    09/18/20 1100 09/18/20 1200 09/18/20 1300 09/18/20 1400   BP: 106/77 103/68 112/79 107/76   Pulse: 132 129 132 128   Resp: 18 19 22 21   Temp:  37.9 °C (100.2 °F)  37.8 °C (100 °F)   TempSrc:  Bladder  Bladder   SpO2: 96% 95% 92% 94%   Weight:       Height:                                                  BP Readings from Last 3 Encounters:   09/18/20 107/76   09/11/20 90/61   09/05/20 (!) 101/55       NPO Status: Time of last liquid consumption: 2300                        Time of last solid consumption: 2300                        Date of last liquid consumption: 09/17/20                        Date of last solid food consumption: 09/17/20    BMI:   Wt Readings from Last 3 Encounters:   09/18/20 252 lb 13.9 oz (114.7 kg)   09/11/20 234 lb 9.6 oz (106.4 kg)   08/11/20 239 lb 4 oz (108.5 kg)     Body mass index is 34.29 kg/m².     CBC:   Lab Results   Component Value Date    WBC 10.6 09/18/2020    RBC 2.89 09/18/2020    HGB 7.9 09/18/2020    HCT 24.8 09/18/2020    MCV 85.8 09/18/2020    RDW 18.3 09/18/2020     09/18/2020       CMP:   Lab Results   Component Value Date     09/18/2020    K 4.3 09/18/2020    K 4.5 09/17/2020     09/18/2020    CO2 20 09/18/2020    BUN 17 09/18/2020    CREATININE 1.0 09/18/2020    GFRAA >60 09/18/2020    LABGLOM >60 09/18/2020    GLUCOSE 124 09/18/2020    GLUCOSE 125 05/11/2012    PROT 5.8 09/18/2020    CALCIUM 8.5 09/18/2020    BILITOT <0.2 09/18/2020    ALKPHOS 118 09/18/2020    AST 9 09/18/2020    ALT 7 09/18/2020       POC Tests: No results for input(s): POCGLU, POCNA, POCK, POCCL, POCBUN, POCHEMO, POCHCT in the last 72 hours.    Coags:   Lab Results   Component Value Date    PROTIME 13.9 09/17/2020    INR 1.2 09/17/2020    APTT 33.5 09/17/2020       HCG (If Applicable): No results found for: PREGTESTUR, PREGSERUM, HCG, HCGQUANT     ABGs: No results found for: PHART, PO2ART, GMG6KTR, LBZ7FQT, BEART, T1AYTJOM     Type & Screen (If Applicable):  No results found for: LABABO, LABRH    Drug/Infectious Status (If Applicable):  No results found for: HIV, HEPCAB    COVID-19 Screening (If Applicable):   Lab Results   Component Value Date    COVID19 Not Detected 09/04/2020     EKG 9/17/2020  Narrative & Impression   Sinus tachycardia rate 131  Otherwise normal ECG     ECHO 8/3/2020   Findings      Left Ventricle   Micro-bubble contrast injected to enhance left ventricular visualization. Normal left ventricular size and systolic function. Ejection fraction is visually estimated at 60-65%. Normal diastolic function. No regional wall motion abnormalities seen. Mild left ventricular concentric hypertrophy noted. Abnormal LV septal motion consistent with conduction disorder. Right Ventricle   Normal right ventricular size and function. Left Atrium   Normal sized left atrium. The interatrial septum appears intact. Right Atrium   Normal right atrial size. Mitral Valve   The mitral valve was not well visualized. No significant mitral valve stenosis. Physiologic mitral regurgitation. Tricuspid Valve   The tricuspid valve was not well visualized. Physiologic and/or trace tricuspid regurgitation. Aortic Valve   Individual aortic valve leaflets are not clearly visualized. No hemodynamically significant aortic stenosis is present. No evidence of aortic valve regurgitation. Pulmonic Valve   The pulmonic valve was not well visualized. No evidence of pulmonic valve stenosis. Physiologic and/or trace pulmonic regurgitation present.       Pericardial Effusion   No evidence of pericardial effusion. Aorta   Aortic root dimension within normal limits. Miscellaneous   Normal Inferior Vena Cava diameter and respiratory variation. Conclusions      Summary   Technically difficult study - limited visualization. Micro-bubble contrast injected to enhance left ventricular visualization. Normal left ventricular size and systolic function. Ejection fraction is visually estimated at 60-65%. Normal diastolic function. No regional wall motion abnormalities seen. Mild left ventricular concentric hypertrophy noted. Abnormal LV septal motion consistent with conduction disorder. Normal right ventricular size and function. No significant valvular abnormalities. CT Abdomen pelvis 9/17/2020  Impression    There is persistent small bowel mural thickening in the midabdomen    involving the jejunum and proximal to mid ileum, with an apparent    stenosis or focal stricture at about the level of the renal vascular    pedicles and inferior vena cava filter in the right paramedian    anterior abdomen. There is no evidence of perforation or obstruction,    however.         There is a pigtail drainage catheter in the gallbladder fossa, which    could be a cholecystostomy catheter or a drainage catheter in the    gallbladder fossa following cholecystectomy.  The gallbladder is    present, it is completely collapsed.         A cystic appearing structure is noted in the splenic bed, and could be    a splenic remnant or a low-density spleen, not prominently enlarged.                    Anesthesia Evaluation  Patient summary reviewed and Nursing notes reviewed no history of anesthetic complications:   Airway: Mallampati: II  TM distance: >3 FB   Neck ROM: full  Mouth opening: > = 3 FB Dental: normal exam     Comment: Patient denies any chipped, cracked, loose or missing teeth    Pulmonary:Negative Pulmonary ROS and normal exam                               Cardiovascular:    (+) hypertension:, past MI: 3-6 months,         Rhythm: regular  Rate: abnormal           Beta Blocker:  Dose within 24 Hrs        PE comment: ST   Neuro/Psych:   (+) neuromuscular disease (fibromyalgia):,              ROS comment: Neuropathic pain  Lumbar radiculopathy   Lumbar disc herniation  GI/Hepatic/Renal:   (+) renal disease: ARF,          ROS comment: Splenic infarction   Splenic abscess   Acute GI bleeding   Intra-abdominal infection    Enterocolitis    Cyst of spleen  . Endo/Other:    (+) DiabetesType II DM, , blood dyscrasia: thrombocytopenia and anemia, arthritis: OA., .                 Abdominal:           Vascular:   + PE. Anesthesia Plan      general     ASA 3     (R IJ 3 lumen  R fem A line  R AC 20)  Induction: intravenous. arterial line and BIS  MIPS: Postoperative opioids intended, Prophylactic antiemetics administered and Postoperative trial extubation. Anesthetic plan and risks discussed with patient. Use of blood products discussed with patient whom consented to blood products. Plan discussed with attending and CRNA.                   Marsha Andino RN   9/18/2020

## 2020-09-18 NOTE — CONSULTS
HERNIA REPAIR  2001    DOUBLE HERNIA    HERNIA REPAIR Right 12/9/2019    LAPAROSCOPIC RIGHT INGUINAL HERNIA REPAIR, MESH 10x15 cm PLACEMENT performed by Dong Blackwood MD at [facility] Left 4/11/2016    NECK SURGERY  2000    FUSION    CA COLONOSCOPY FLX DX W/COLLJ SPEC WHEN PFRMD N/A 5/7/2018    COLONOSCOPY DIAGNOSTIC performed by Ramo Bolivar MD at [facility] Left 2014    Dr. Mann Sports ARTHROSCOPY Left 11 21 14   [address] [facility], [facility] SURGERY  2001 &2007    RIGHT AND LEFT repair of tears    TOTAL HIP ARTHROPLASTY Right 10/31/2016    Total right hip  Porter Cadet MD    UPPER GASTROINTESTINAL ENDOSCOPY N/A 9/4/2020    EGD DIAGNOSTIC ONLY performed by Mami Macdonald MD at [place] [address]  2007    LEFT WRIST     Family History   Problem Relation Age of Onset    Hypertension Mother     Arthritis Father     Diabetes Father     Cancer Maternal Grandfather         Skin    Cancer Paternal Uncle         skin    Diabetes Paternal Grandfather     Heart Disease Maternal Grandmother     Stroke Maternal Aunt      Social History     Tobacco Use    Smoking status: Never Smoker    Smokeless tobacco: Current User     Types: Chew   Substance Use Topics    Alcohol use: Not Currently     Alcohol/week: 0.0 standard drinks     Frequency: Monthly or less    Drug use: No     Allergies   Allergen Reactions    Seasonal      HAYFEVER / sneezing,watery eyes    Tramadol Itching     Current Facility-Administered Medications   Medication Dose Route Frequency Provider Last Rate Last Dose    sodium chloride flush 0.9 % injection 10 mL  10 mL Intravenous PRN Maxine Evans II, MD   10 mL at 09/17/20 1651    lactated ringers bolus  1,000 mL Intravenous Once Larry Watkins MD        piperacillin-tazobactam (ZOSYN) 3.375 g in dextrose 5 % 100 mL IVPB extended infusion (mini-bag)  3.375 g Intravenous Cabrera Rider MD 25 mL/hr at 09/17/20 1954 3.375 g at 09/17/20 1954    [START ON 9/18/2020] 0.9 % sodium chloride infusion admixture   Intravenous Q8H Jeri Wiseman, DO         Current Outpatient Medications   Medication Sig Dispense Refill    metoprolol tartrate (LOPRESSOR) 25 MG tablet Take 1 tablet by mouth 2 times daily 60 tablet 3    metFORMIN (GLUCOPHAGE) 500 MG tablet Take 1 tablet by mouth 2 times daily (with meals) 180 tablet 1    pregabalin (LYRICA) 300 MG capsule Take 300 mg by mouth 2 times daily.  melatonin 3 MG TABS tablet Take 3 mg by mouth nightly as needed (sleep)      fluticasone (FLONASE) 50 MCG/ACT nasal spray 2 sprays by Nasal route daily      cyclobenzaprine (FLEXERIL) 10 MG tablet Take 10 mg by mouth three times daily      fenofibrate (TRICOR) 54 MG tablet Take 54 mg by mouth daily      ferrous sulfate (IRON 325) 325 (65 Fe) MG tablet Take 325 mg by mouth 2 times daily      hydroxyurea (HYDREA) 500 MG chemo capsule Take 500 mg by mouth 2 times daily      loperamide (IMODIUM) 2 MG capsule Take 2 mg by mouth 4 times daily as needed for Diarrhea      nystatin (MYCOSTATIN) 811159 UNIT/GM powder Apply topically 2 times daily      insulin lispro (HUMALOG) 100 UNIT/ML injection vial Inject into the skin 3 times daily (before meals) *Plus Sliding Scale*      pantoprazole (PROTONIX) 40 MG tablet Take 40 mg by mouth daily      DULoxetine (CYMBALTA) 30 MG extended release capsule Take 1 capsule by mouth daily 90 capsule 1    pravastatin (PRAVACHOL) 20 MG tablet Take 1 tablet by mouth nightly 90 tablet 1     Review of Systems   Constitutional: Positive for activity change, appetite change and fatigue. Negative for chills, fever and unexpected weight change. HENT: Negative. Eyes: Negative. Respiratory: Positive for shortness of breath. Negative for cough. Cardiovascular: Negative. Negative for chest pain and leg swelling. Gastrointestinal: Positive for abdominal pain, blood in stool, diarrhea and rectal pain. Negative for constipation, nausea and vomiting. Endocrine: Negative. Genitourinary: Negative. Musculoskeletal: Negative. Skin: Positive for wound (perianal). Allergic/Immunologic: Negative. Neurological: Positive for dizziness and light-headedness. Hematological: Negative. Psychiatric/Behavioral: Negative. BP 98/72   Pulse 119   Temp 98.2 °F (36.8 °C)   Resp 17   Ht 6' (1.829 m)   Wt 234 lb (106.1 kg)   SpO2 98%   BMI 31.74 kg/m²   Gen:  NAD  HEENT:  PERRL @4mm  Chest:  No increased WOB, tachycardia  Abd:  Soft, ND, moderate midabdominal TTP; prior surgical scars from inguinal and ventral laparoscopic hernia repairs. Cholecystostomy tube in place draining bile--I clamped at this time. CT scan reviewed--small bowel interloop fistula with enteritis present; perianal wound improving    ASSESSMENT/PLAN:  1. 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 Will evaluate for cholecystectomy at that time. 2.  Acute acalculous cholecystitis--with cholecystostomy tube in place--evaluate for cholecystectomy tomorrow; clamp tube tonight;  Patient advised that if having increased pain to let us know and will unclamp tube  3. Lactic acidosis--ICU admission and continue resuscitation  4.   Sepsis--start Zosyn, c/w resuscitation    Mark Schulz MD, MSc, FACS  9/17/2020  8:59 PM

## 2020-09-19 ENCOUNTER — APPOINTMENT (OUTPATIENT)
Dept: GENERAL RADIOLOGY | Age: 45
DRG: 710 | End: 2020-09-19
Payer: COMMERCIAL

## 2020-09-19 LAB
ALBUMIN SERPL-MCNC: 2.4 G/DL (ref 3.5–5.2)
ALBUMIN SERPL-MCNC: 2.8 G/DL (ref 3.5–5.2)
ALP BLD-CCNC: 134 U/L (ref 40–129)
ALP BLD-CCNC: 134 U/L (ref 40–129)
ALT SERPL-CCNC: 38 U/L (ref 0–40)
ALT SERPL-CCNC: 44 U/L (ref 0–40)
ANION GAP SERPL CALCULATED.3IONS-SCNC: 13 MMOL/L (ref 7–16)
ANION GAP SERPL CALCULATED.3IONS-SCNC: 18 MMOL/L (ref 7–16)
AST SERPL-CCNC: 59 U/L (ref 0–39)
AST SERPL-CCNC: 76 U/L (ref 0–39)
BILIRUB SERPL-MCNC: 0.2 MG/DL (ref 0–1.2)
BILIRUB SERPL-MCNC: <0.2 MG/DL (ref 0–1.2)
BILIRUBIN DIRECT: <0.2 MG/DL (ref 0–0.3)
BILIRUBIN, INDIRECT: ABNORMAL MG/DL (ref 0–1)
BUN BLDV-MCNC: 19 MG/DL (ref 6–20)
BUN BLDV-MCNC: 22 MG/DL (ref 6–20)
CALCIUM IONIZED: 1.23 MMOL/L (ref 1.15–1.33)
CALCIUM SERPL-MCNC: 8.5 MG/DL (ref 8.6–10.2)
CALCIUM SERPL-MCNC: 9 MG/DL (ref 8.6–10.2)
CHLORIDE BLD-SCNC: 99 MMOL/L (ref 98–107)
CHLORIDE BLD-SCNC: 99 MMOL/L (ref 98–107)
CO2: 17 MMOL/L (ref 22–29)
CO2: 23 MMOL/L (ref 22–29)
CREAT SERPL-MCNC: 1.5 MG/DL (ref 0.7–1.2)
CREAT SERPL-MCNC: 1.5 MG/DL (ref 0.7–1.2)
CREATININE URINE: 219 MG/DL (ref 40–278)
GFR AFRICAN AMERICAN: >60
GFR AFRICAN AMERICAN: >60
GFR NON-AFRICAN AMERICAN: 50 ML/MIN/1.73
GFR NON-AFRICAN AMERICAN: 50 ML/MIN/1.73
GLUCOSE BLD-MCNC: 207 MG/DL (ref 74–99)
GLUCOSE BLD-MCNC: 266 MG/DL (ref 74–99)
HCT VFR BLD CALC: 25 % (ref 37–54)
HEMOGLOBIN: 7.7 G/DL (ref 12.5–16.5)
LACTIC ACID: 0.8 MMOL/L (ref 0.5–2.2)
LACTIC ACID: 2.8 MMOL/L (ref 0.5–2.2)
LACTIC ACID: 4.5 MMOL/L (ref 0.5–2.2)
MAGNESIUM: 1.9 MG/DL (ref 1.6–2.6)
MAGNESIUM: 2 MG/DL (ref 1.6–2.6)
MCH RBC QN AUTO: 27.1 PG (ref 26–35)
MCHC RBC AUTO-ENTMCNC: 30.8 % (ref 32–34.5)
MCV RBC AUTO: 88 FL (ref 80–99.9)
METER GLUCOSE: 116 MG/DL (ref 74–99)
METER GLUCOSE: 120 MG/DL (ref 74–99)
METER GLUCOSE: 121 MG/DL (ref 74–99)
METER GLUCOSE: 123 MG/DL (ref 74–99)
METER GLUCOSE: 131 MG/DL (ref 74–99)
METER GLUCOSE: 146 MG/DL (ref 74–99)
METER GLUCOSE: 168 MG/DL (ref 74–99)
METER GLUCOSE: 171 MG/DL (ref 74–99)
METER GLUCOSE: 176 MG/DL (ref 74–99)
METER GLUCOSE: 181 MG/DL (ref 74–99)
METER GLUCOSE: 201 MG/DL (ref 74–99)
METER GLUCOSE: 236 MG/DL (ref 74–99)
METER GLUCOSE: 261 MG/DL (ref 74–99)
METER GLUCOSE: 284 MG/DL (ref 74–99)
METER GLUCOSE: 97 MG/DL (ref 74–99)
MRSA CULTURE ONLY: NORMAL
PDW BLD-RTO: 18.3 FL (ref 11.5–15)
PHOSPHORUS: 4.5 MG/DL (ref 2.5–4.5)
PHOSPHORUS: 4.9 MG/DL (ref 2.5–4.5)
PLATELET # BLD: 452 E9/L (ref 130–450)
PMV BLD AUTO: 8.8 FL (ref 7–12)
POTASSIUM SERPL-SCNC: 4.7 MMOL/L (ref 3.5–5)
POTASSIUM SERPL-SCNC: 4.9 MMOL/L (ref 3.5–5)
RBC # BLD: 2.84 E12/L (ref 3.8–5.8)
SODIUM BLD-SCNC: 134 MMOL/L (ref 132–146)
SODIUM BLD-SCNC: 135 MMOL/L (ref 132–146)
SODIUM URINE: <20 MMOL/L
TOTAL PROTEIN: 5.6 G/DL (ref 6.4–8.3)
TOTAL PROTEIN: 5.9 G/DL (ref 6.4–8.3)
WBC # BLD: 26.3 E9/L (ref 4.5–11.5)

## 2020-09-19 PROCEDURE — 37799 UNLISTED PX VASCULAR SURGERY: CPT

## 2020-09-19 PROCEDURE — 94770 HC ETCO2 MONITOR DAILY: CPT

## 2020-09-19 PROCEDURE — 85027 COMPLETE CBC AUTOMATED: CPT

## 2020-09-19 PROCEDURE — 82330 ASSAY OF CALCIUM: CPT

## 2020-09-19 PROCEDURE — 6370000000 HC RX 637 (ALT 250 FOR IP): Performed by: SURGERY

## 2020-09-19 PROCEDURE — 2700000000 HC OXYGEN THERAPY PER DAY

## 2020-09-19 PROCEDURE — 82570 ASSAY OF URINE CREATININE: CPT

## 2020-09-19 PROCEDURE — 2500000003 HC RX 250 WO HCPCS: Performed by: STUDENT IN AN ORGANIZED HEALTH CARE EDUCATION/TRAINING PROGRAM

## 2020-09-19 PROCEDURE — 2580000003 HC RX 258: Performed by: STUDENT IN AN ORGANIZED HEALTH CARE EDUCATION/TRAINING PROGRAM

## 2020-09-19 PROCEDURE — 6360000002 HC RX W HCPCS: Performed by: STUDENT IN AN ORGANIZED HEALTH CARE EDUCATION/TRAINING PROGRAM

## 2020-09-19 PROCEDURE — 84100 ASSAY OF PHOSPHORUS: CPT

## 2020-09-19 PROCEDURE — 6360000002 HC RX W HCPCS: Performed by: SURGERY

## 2020-09-19 PROCEDURE — C9113 INJ PANTOPRAZOLE SODIUM, VIA: HCPCS | Performed by: STUDENT IN AN ORGANIZED HEALTH CARE EDUCATION/TRAINING PROGRAM

## 2020-09-19 PROCEDURE — 84300 ASSAY OF URINE SODIUM: CPT

## 2020-09-19 PROCEDURE — 99291 CRITICAL CARE FIRST HOUR: CPT | Performed by: SURGERY

## 2020-09-19 PROCEDURE — 82962 GLUCOSE BLOOD TEST: CPT

## 2020-09-19 PROCEDURE — 2580000003 HC RX 258: Performed by: EMERGENCY MEDICINE

## 2020-09-19 PROCEDURE — 2580000003 HC RX 258: Performed by: SURGERY

## 2020-09-19 PROCEDURE — 71045 X-RAY EXAM CHEST 1 VIEW: CPT

## 2020-09-19 PROCEDURE — 36415 COLL VENOUS BLD VENIPUNCTURE: CPT

## 2020-09-19 PROCEDURE — 83735 ASSAY OF MAGNESIUM: CPT

## 2020-09-19 PROCEDURE — 80053 COMPREHEN METABOLIC PANEL: CPT

## 2020-09-19 PROCEDURE — 80048 BASIC METABOLIC PNL TOTAL CA: CPT

## 2020-09-19 PROCEDURE — 83605 ASSAY OF LACTIC ACID: CPT

## 2020-09-19 PROCEDURE — 2000000000 HC ICU R&B

## 2020-09-19 PROCEDURE — 80076 HEPATIC FUNCTION PANEL: CPT

## 2020-09-19 PROCEDURE — 6370000000 HC RX 637 (ALT 250 FOR IP): Performed by: STUDENT IN AN ORGANIZED HEALTH CARE EDUCATION/TRAINING PROGRAM

## 2020-09-19 RX ORDER — SODIUM CHLORIDE, SODIUM LACTATE, POTASSIUM CHLORIDE, CALCIUM CHLORIDE 600; 310; 30; 20 MG/100ML; MG/100ML; MG/100ML; MG/100ML
1000 INJECTION, SOLUTION INTRAVENOUS ONCE
Status: DISCONTINUED | OUTPATIENT
Start: 2020-09-18 | End: 2020-09-26 | Stop reason: HOSPADM

## 2020-09-19 RX ORDER — SODIUM CHLORIDE, SODIUM LACTATE, POTASSIUM CHLORIDE, AND CALCIUM CHLORIDE .6; .31; .03; .02 G/100ML; G/100ML; G/100ML; G/100ML
1000 INJECTION, SOLUTION INTRAVENOUS ONCE
Status: COMPLETED | OUTPATIENT
Start: 2020-09-19 | End: 2020-09-19

## 2020-09-19 RX ORDER — SODIUM CHLORIDE, SODIUM LACTATE, POTASSIUM CHLORIDE, CALCIUM CHLORIDE 600; 310; 30; 20 MG/100ML; MG/100ML; MG/100ML; MG/100ML
1000 INJECTION, SOLUTION INTRAVENOUS ONCE
Status: DISCONTINUED | OUTPATIENT
Start: 2020-09-19 | End: 2020-09-19

## 2020-09-19 RX ORDER — SODIUM CHLORIDE, SODIUM LACTATE, POTASSIUM CHLORIDE, CALCIUM CHLORIDE 600; 310; 30; 20 MG/100ML; MG/100ML; MG/100ML; MG/100ML
1000 INJECTION, SOLUTION INTRAVENOUS ONCE
Status: COMPLETED | OUTPATIENT
Start: 2020-09-19 | End: 2020-09-19

## 2020-09-19 RX ORDER — SODIUM CHLORIDE, SODIUM LACTATE, POTASSIUM CHLORIDE, CALCIUM CHLORIDE 600; 310; 30; 20 MG/100ML; MG/100ML; MG/100ML; MG/100ML
1000 INJECTION, SOLUTION INTRAVENOUS ONCE
Status: DISCONTINUED | OUTPATIENT
Start: 2020-09-19 | End: 2020-09-26 | Stop reason: HOSPADM

## 2020-09-19 RX ORDER — INSULIN GLARGINE 100 [IU]/ML
25 INJECTION, SOLUTION SUBCUTANEOUS DAILY
Status: DISCONTINUED | OUTPATIENT
Start: 2020-09-19 | End: 2020-09-26 | Stop reason: HOSPADM

## 2020-09-19 RX ADMIN — INSULIN LISPRO 6 UNITS: 100 INJECTION, SOLUTION INTRAVENOUS; SUBCUTANEOUS at 20:16

## 2020-09-19 RX ADMIN — Medication 10 ML: at 08:21

## 2020-09-19 RX ADMIN — METHOCARBAMOL 500 MG: 100 INJECTION INTRAMUSCULAR; INTRAVENOUS at 08:14

## 2020-09-19 RX ADMIN — Medication: at 09:09

## 2020-09-19 RX ADMIN — SODIUM CHLORIDE, POTASSIUM CHLORIDE, SODIUM LACTATE AND CALCIUM CHLORIDE: 600; 310; 30; 20 INJECTION, SOLUTION INTRAVENOUS at 21:48

## 2020-09-19 RX ADMIN — SODIUM CHLORIDE, POTASSIUM CHLORIDE, SODIUM LACTATE AND CALCIUM CHLORIDE 1000 ML: 600; 310; 30; 20 INJECTION, SOLUTION INTRAVENOUS at 21:04

## 2020-09-19 RX ADMIN — SODIUM CHLORIDE 6.72 UNITS/HR: 9 INJECTION, SOLUTION INTRAVENOUS at 00:45

## 2020-09-19 RX ADMIN — METHOCARBAMOL 500 MG: 100 INJECTION INTRAMUSCULAR; INTRAVENOUS at 15:26

## 2020-09-19 RX ADMIN — ENOXAPARIN SODIUM 40 MG: 40 INJECTION SUBCUTANEOUS at 08:13

## 2020-09-19 RX ADMIN — METOPROLOL TARTRATE 5 MG: 5 INJECTION INTRAVENOUS at 02:20

## 2020-09-19 RX ADMIN — METHOCARBAMOL 500 MG: 100 INJECTION INTRAMUSCULAR; INTRAVENOUS at 02:20

## 2020-09-19 RX ADMIN — FLUTICASONE PROPIONATE 2 SPRAY: 50 SPRAY, METERED NASAL at 08:14

## 2020-09-19 RX ADMIN — METHOCARBAMOL 500 MG: 100 INJECTION INTRAMUSCULAR; INTRAVENOUS at 21:41

## 2020-09-19 RX ADMIN — PIPERACILLIN SODIUM AND TAZOBACTAM SODIUM 3.38 G: 3; .375 INJECTION, POWDER, LYOPHILIZED, FOR SOLUTION INTRAVENOUS at 04:14

## 2020-09-19 RX ADMIN — SODIUM CHLORIDE, POTASSIUM CHLORIDE, SODIUM LACTATE AND CALCIUM CHLORIDE 1000 ML: 600; 310; 30; 20 INJECTION, SOLUTION INTRAVENOUS at 03:13

## 2020-09-19 RX ADMIN — SODIUM CHLORIDE, POTASSIUM CHLORIDE, SODIUM LACTATE AND CALCIUM CHLORIDE 1000 ML: 600; 310; 30; 20 INJECTION, SOLUTION INTRAVENOUS at 18:32

## 2020-09-19 RX ADMIN — SODIUM CHLORIDE: 9 INJECTION, SOLUTION INTRAVENOUS at 15:31

## 2020-09-19 RX ADMIN — INSULIN LISPRO 3 UNITS: 100 INJECTION, SOLUTION INTRAVENOUS; SUBCUTANEOUS at 23:47

## 2020-09-19 RX ADMIN — Medication: at 22:05

## 2020-09-19 RX ADMIN — SODIUM CHLORIDE, POTASSIUM CHLORIDE, SODIUM LACTATE AND CALCIUM CHLORIDE 1000 ML: 600; 310; 30; 20 INJECTION, SOLUTION INTRAVENOUS at 10:09

## 2020-09-19 RX ADMIN — PIPERACILLIN SODIUM AND TAZOBACTAM SODIUM 3.38 G: 3; .375 INJECTION, POWDER, LYOPHILIZED, FOR SOLUTION INTRAVENOUS at 12:13

## 2020-09-19 RX ADMIN — PANTOPRAZOLE SODIUM 40 MG: 40 INJECTION, POWDER, FOR SOLUTION INTRAVENOUS at 08:15

## 2020-09-19 RX ADMIN — METOPROLOL TARTRATE 5 MG: 5 INJECTION INTRAVENOUS at 21:04

## 2020-09-19 RX ADMIN — PIPERACILLIN SODIUM AND TAZOBACTAM SODIUM 3.38 G: 3; .375 INJECTION, POWDER, LYOPHILIZED, FOR SOLUTION INTRAVENOUS at 21:03

## 2020-09-19 RX ADMIN — SODIUM CHLORIDE: 9 INJECTION, SOLUTION INTRAVENOUS at 00:00

## 2020-09-19 RX ADMIN — METOPROLOL TARTRATE 5 MG: 5 INJECTION INTRAVENOUS at 08:15

## 2020-09-19 RX ADMIN — INSULIN GLARGINE 25 UNITS: 100 INJECTION, SOLUTION SUBCUTANEOUS at 11:09

## 2020-09-19 RX ADMIN — SODIUM CHLORIDE: 9 INJECTION, SOLUTION INTRAVENOUS at 08:22

## 2020-09-19 RX ADMIN — METOPROLOL TARTRATE 5 MG: 5 INJECTION INTRAVENOUS at 15:30

## 2020-09-19 RX ADMIN — INSULIN LISPRO 3 UNITS: 100 INJECTION, SOLUTION INTRAVENOUS; SUBCUTANEOUS at 15:46

## 2020-09-19 ASSESSMENT — PAIN DESCRIPTION - PAIN TYPE
TYPE: ACUTE PAIN
TYPE: SURGICAL PAIN

## 2020-09-19 ASSESSMENT — PAIN SCALES - GENERAL
PAINLEVEL_OUTOF10: 2
PAINLEVEL_OUTOF10: 8
PAINLEVEL_OUTOF10: 8

## 2020-09-19 ASSESSMENT — PAIN DESCRIPTION - FREQUENCY: FREQUENCY: CONTINUOUS

## 2020-09-19 ASSESSMENT — PAIN DESCRIPTION - PROGRESSION
CLINICAL_PROGRESSION: NOT CHANGED
CLINICAL_PROGRESSION: NOT CHANGED

## 2020-09-19 ASSESSMENT — PAIN DESCRIPTION - LOCATION
LOCATION: ABDOMEN;BUTTOCKS;BACK
LOCATION: ABDOMEN;BACK

## 2020-09-19 ASSESSMENT — PAIN - FUNCTIONAL ASSESSMENT: PAIN_FUNCTIONAL_ASSESSMENT: PREVENTS OR INTERFERES SOME ACTIVE ACTIVITIES AND ADLS

## 2020-09-19 ASSESSMENT — PAIN DESCRIPTION - ORIENTATION: ORIENTATION: MID

## 2020-09-19 ASSESSMENT — PAIN DESCRIPTION - DESCRIPTORS: DESCRIPTORS: DISCOMFORT;CONSTANT;SORE

## 2020-09-19 ASSESSMENT — PAIN DESCRIPTION - ONSET: ONSET: ON-GOING

## 2020-09-19 NOTE — PROGRESS NOTES
Weight 138.8 %   · BMI: 33.5  · BMI Categories: Obese Class 1 (BMI 30.0-34. 9)       Nutrition Diagnosis:   · Increased nutrient needs related to increase demand for energy/nutrients as evidenced by wounds      Nutrition Interventions:   Food and/or Nutrient Delivery:  Continue NPO(ADAT)  Nutrition Education/Counseling:  Education not indicated   Coordination of Nutrition Care:  Continued Inpatient Monitoring    Goals:  Nutrition progression       Nutrition Monitoring and Evaluation:   Food/Nutrient Intake Outcomes:  Diet Advancement/Tolerance  Physical Signs/Symptoms Outcomes:  Biochemical Data, GI Status, Fluid Status or Edema, Nutrition Focused Physical Findings, Skin, Weight     Discharge Planning:     Too soon to determine     Electronically signed by Lv Barahona MS, RD, LD on 9/19/20 at 12:21 PM EDT    Contact: [phone]

## 2020-09-19 NOTE — ANESTHESIA POSTPROCEDURE EVALUATION
Department of Anesthesiology  Postprocedure Note    Patient: Katty Tee  MRN: [id number]  YOB: 1975  Date of evaluation: 9/19/2020  Time:  12:26 PM     Procedure Summary     Date:  09/18/20 Room / Location:  [facility] OR 06 / [facility]    Anesthesia Start:  4958 Anesthesia Stop:  1937    Procedure:  LAPAROTOMY EXPLORATORY, CHOLECYSTECTOMY, BOWEL RESECTION, right darian colectomy, partial omentectomy, and splenectomy (N/A ) Diagnosis:  (.)    Surgeon:  Jarrell Cisneros MD Responsible Provider:  Chelsey Morales MD    Anesthesia Type:  general ASA Status:  3          Anesthesia Type: general    Cristal Phase I: Cristal Score: 9    Cristal Phase II:      Last vitals: Reviewed and per EMR flowsheets.        Anesthesia Post Evaluation    Patient location during evaluation: ICU  Patient participation: complete - patient participated  Level of consciousness: awake and alert  Airway patency: patent  Nausea & Vomiting: no nausea and no vomiting  Complications: no  Cardiovascular status: blood pressure returned to baseline and hemodynamically stable  Respiratory status: acceptable and spontaneous ventilation  Hydration status: euvolemic

## 2020-09-19 NOTE — PROGRESS NOTES
[facility]  SURGICAL INTENSIVE CARE UNIT (SICU)  ATTENDING PHYSICIAN CRITICAL CARE PROGRESS NOTE     I have examined the patient, reviewed the record,and discussed the case with the APN/  Resident. I have reviewed all relevant labs and imaging data. The following summarizes my clinical findings and independent assessment. Date of admission:  9/17/2020    CC: Sepsis     HOSPITAL COURSE:   45y/o M with extension recent history with DKA, MI x 3 that was earlier in the summer and he was treated in [facility]. Last month he was in the MICU with sepsis and GI bleed. Cholecystostomy tube was placed and he had upper and lower endoscopy performed. There was a cecal mass, but the pathology was benign. He presented now with SOB and near syncope. He still has his cholecystostomy tube. He also has abdominal pain. He denies any family history of Crohn's disease or UC. He was also seen last month for splenic abscess. He was diagnosed with PE in the beginning of the summer and had an IVC filter placed earlier this month when he started having the GI bleeding. 9/19 Yesterday had cholecystectomy, splenectomy, SBR and Right hemicolectomy     New Imaging Reviewed:   KUB - NGT in good position      Physical Exam  HENT:      Head: Normocephalic. Eyes:      Extraocular Movements: Extraocular movements intact. Pupils: Pupils are equal, round, and reactive to light. Neck:      Musculoskeletal: Normal range of motion. Cardiovascular:      Rate and Rhythm: Tachycardia present. Pulmonary:      Effort: Pulmonary effort is normal. No respiratory distress. Abdominal:      General: There is distension ( mild). Tenderness: There is abdominal tenderness ( appropriate). There is no guarding or rebound. Comments: Midline dressing mild stripe through    Musculoskeletal: Normal range of motion. Skin:     General: Skin is warm. Neurological:      Mental Status: He is alert.    Psychiatric: Mood and Affect: Mood normal.         Assessment   Active Problems:    SIMÓN (acute kidney injury) ([facility] [address]    Septicemia ([facility] [address]    Intra-abdominal infection  Resolved Problems:    * No resolved hospital problems.  *      Plan   GI:  NPO  NGT Suction path pending   Neuro:  Cymbalta on hold , dilaudid PCA ,robaxin   Renal: 150cc LR, SIMÓN Cr 1.5 - Likely prerenal could be some aspect of contrast nephropathy, Lactate down trending Q6 hour checks, Monitor Urine Output, Daily CBC,BMP, Mg,Phos, ionized Ca    Musculoskeletal: WBAT all extremities ,  AM-PAC Score pending   Pulmonary: Aggressive pulmonary hygiene , SMI , Monitor RR and Maintain SpO2 > 92%  ID: Zosyn  24 hours perioperative then stop minimal intra op contamination Monitor leukocytosis and Monitor Fever Curve Post splenectomy vaccinations prior to DC   Heme: No indication for Transfusion   Cardiac: Monitor Hemodynamics Home metoprolol ( IV)  , statin ( once enteral intact)   Endocrine: Hold  metformin, Start Lantus 25U daily stop  Insulin ggt in 2 hours and give Angelika dose SSI       DVT Prophylaxis: PCDs, SQ Lovenox   Ulcer Prophylaxis: Home PPI   Tubes and Lines: Continue Central Line   Seizure proph:     No Indication  Ancillary consults:   PT/OT    Family Update:         As available   CODE Status:       Full Code    Dispo: SICU    Lisa George MD    Critical Care: 32 minutes evaluating and managing patient post surgical  on insulin gtt

## 2020-09-19 NOTE — PROGRESS NOTES
dyspnea. Respiratory:  Shortness of breath, coughing, sputum production, hemoptysis, wheezing, orthopnea. Gastrointestinal:  Nausea, vomiting, diarrhea, heartburn, constipation, abdominal pain, hematemesis, hematochezia, melena, acholic stools  Genito-Urinary:  Dysuria, urgency, frequency, hematuria  Musculoskeletal:  Joint pain, joint stiffness, joint swelling, muscle pain  Neurology:  Headache, focal neurological deficits, weakness, numbness, paresthesia  Derm:  Rashes, ulcers, excoriations, bruising  Extremities:  Decreased ROM, peripheral edema, mottling      OBJECTIVE:     /73   Pulse 114   Temp 97.7 °F (36.5 °C) (Bladder)   Resp 11   Ht 6' (1.829 m)   Wt 255 lb 4.7 oz (115.8 kg)   SpO2 98%   BMI 34.62 kg/m²   General Appearance:  awake, alert, and oriented to person, place, time, and purpose; appears stated age and cooperative; no apparent distress no labored breathing  HEENT:  NCAT; PERRL; conjunctiva pink, sclera clear  Neck:  no adenopathy, bruit, JVD, tenderness, masses, or nodules; supple, symmetrical, trachea midline, thyroid not enlarged  Lung:  clear to auscultation bilaterally; no use of accessory muscles; no rhonchi, rales, or crackles  Heart:  tachycardic without murmur, rub, or gallop  Abdomen:  soft, post surgical abdomen ; normoactive bowel sounds; no organomegaly  Extremities:  extremities normal, atraumatic, no cyanosis +edema  Musculokeletal:  no joint swelling, no muscle tenderness. ROM normal in all joints of extremities. 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 possible Crohns-General surgery following. Zosyn.  9/18- Patient underwent ex lap with cholecystectomy, right hemicolectomy with small bowel resection and side to side ileocolonic stabled anastomosis, INR 1.2       Recent Labs     09/17/20  1440   TROPONINI 0.03       Chronic labs:    Lab Results   Component Value Date    CHOL 94 09/18/2020    TRIG 151 (H) 09/18/2020    HDL 33 09/18/2020    LDLCALC 31 09/18/2020    TSH 0.833 09/18/2020    INR 1.2 09/17/2020    LABA1C 5.5 09/18/2020       Radiology: REVIEWED DAILY    +++++++++++++++++++++++++++++++++++++++++++++++++  Ct Carrasco DO  [employer] - [address] [place], [place]  +++++++++++++++++++++++++++++++++++++++++++++++++  NOTE: This report was transcribed using voice recognition software. Every effort was made to ensure accuracy; however, inadvertent computerized transcription errors may be present.

## 2020-09-19 NOTE — BRIEF OP NOTE
Brief Postoperative Note      Patient: Luvenia Olszewski II  YOB: 1975  MRN: [id number]    Date of Procedure: 9/18/2020    Pre-Op Diagnosis: chronic cholecystitis, Small bowel fistula/abscesss     Post-Op Diagnosis: small bowel masses, R colonic mass, Chronic cholecystitis, splenic infarct and cyst.        Procedure(s):  LAPAROTOMY EXPLORATORY, CHOLECYSTECTOMY, BOWEL RESECTION, right darian colectomy, partial omentectomy, and splenectomy    Surgeon(s):  Elmer Hinton MD    Assistant:  Resident: Prachi Barakat MD    Anesthesia: General    Estimated Blood Loss (mL): 819    Complications: None    Specimens:   ID Type Source Tests Collected by Time Destination   A : GALLBLADDER AND CONTENTS Tissue Gallbladder SURGICAL PATHOLOGY Elmer Hinton MD 9/18/2020 1734    B : SMALL BOWEL AND RIGHT COLON Tissue Tissue SURGICAL PATHOLOGY Elmer Hinton MD 9/18/2020 1825    C : SPLEEN Tissue Tissue SURGICAL PATHOLOGY Elmer Hinton MD 9/18/2020 1847        Implants:  * No implants in log *      Drains:   Urethral Catheter Temperature probe (Active)   $ Urethral catheter insertion $ Not inserted for procedure 09/18/20 0300   Catheter Indications Need for fluid management in critically ill patients in a critical care setting not able to be managed by other means such as BSC with hat, bedpan, urinal, condom catheter, or short term intermittent urethral catherization 09/18/20 1600   Securement Device Date Changed 09/18/20 09/18/20 0800   Site Assessment No urethral drainage 09/18/20 1600   Urine Color Yellow;Diluted 09/18/20 1600   Urine Appearance Hazy 09/18/20 1600   Output (mL) 75 mL 09/18/20 1600       [REMOVED] Closed/Suction Drain Right RUQ Bulb 10 Swazi (Removed)   Site Description Healing 09/10/20 1146   Dressing Status Clean;Dry; Intact 09/10/20 1146   Drainage Appearance Brown 09/10/20 1146   Status Clamped 09/18/20 1600   Output (ml) 0 ml 09/18/20 1600       Findings: Fibrotic adhesions of multiple loops of distal

## 2020-09-19 NOTE — PLAN OF CARE
Problem: Falls - Risk of:  Goal: Will remain free from falls  Description: Will remain free from falls  9/19/2020 1600 by Laxmi Carter RN  Outcome: Met This Shift  9/19/2020 0950 by Laxmi Carter RN  Outcome: Met This Shift  Goal: Absence of physical injury  Description: Absence of physical injury  9/19/2020 1600 by Laxmi Carter RN  Outcome: Met This Shift  9/19/2020 0950 by Laxmi Carter RN  Outcome: Met This Shift     Problem: Skin Integrity:  Goal: Will show no infection signs and symptoms  Description: Will show no infection signs and symptoms  9/19/2020 1600 by Laxmi Carter RN  Outcome: Met This Shift  9/19/2020 0950 by Laxmi Carter RN  Outcome: Met This Shift  Goal: Absence of new skin breakdown  Description: Absence of new skin breakdown  9/19/2020 1600 by Laxmi Carter RN  Outcome: Met This Shift  9/19/2020 0950 by Laxmi Carter RN  Outcome: Met This Shift     Problem: Pain:  Goal: Pain level will decrease  Description: Pain level will decrease  9/19/2020 1600 by Laxmi Carter RN  Outcome: Met This Shift  9/19/2020 0950 by Laxmi Carter RN  Outcome: Met This Shift  Goal: Control of acute pain  Description: Control of acute pain  9/19/2020 1600 by Laxmi Carter RN  Outcome: Met This Shift  9/19/2020 0950 by Laxmi Carter RN  Outcome: Met This Shift  Goal: Control of chronic pain  Description: Control of chronic pain  9/19/2020 1600 by Laxmi Carter RN  Outcome: Met This Shift  9/19/2020 0950 by Laxmi Carter RN  Outcome: Met This Shift

## 2020-09-19 NOTE — OP NOTE
[address]                  [address] [id number],  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 OPERATIONS PERFORMED:  1. Exploratory laparotomy. 2.  Cholecystectomy. 3.  Right hemicolectomy with small bowel resection and side-to-side  ileocolonic stapled anastomosis. 4.  Splenectomy. 5.  Omentectomy. SURGEON:  Dhruv Staton MD    ASSISTANT:  Horacio Lim MD, PGY-5    ANESTHESIA:  General.    ESTIMATED BLOOD LOSS:  500 mL. COMPLICATIONS  None. SPECIMENS:  Gallbladder, right colon, terminal ileum and ileum and  spleen. HISTORY:  This is a 15-year-old male that presented with abdominal pain,  had been seen in the past for multiple issues including GI bleed and  abdominal pain. CT scan demonstrated an interloop small bowel fistula  with potential colon involvement and then a left upper quadrant mass  that appeared to be _____ spleen. He had a prior colonoscopy two weeks  ago that demonstrated a cecal mass with pathology showing lymphoid  aggregate. It was recommended to the patient that he undergo  exploratory laparotomy, possible bowel resection. The risks, benefits  and alternatives of procedure were discussed with the patient, who  stated understanding and agreed to proceed. Permit was signed. DESCRIPTION OF PROCEDURE:  The patient brought to the operating room,  positioned supine on the OR table.   Sequential compression devices were  placed on the patient's lower interloop fistula was  at an area of about 260 cm from the ligament of Treitz with about a  remaining 40-50 cm at that point towards the terminal ileum and the  cecum. There was also an area of cecal inflammation and what appeared  to be the mass and the mass, which was most likely what we had seen on  colonoscopy and biopsy and was resolving just to be a lymphoid  aggregate. With these two particular areas of concern, we decided to  perform a right hemicolectomy with the concern for potential malignancy  and then needed to do an extended small bowel resection once again about  50 cm from the terminal ileum including the terminal ileum and that  portion of small bowel. First we made a window within the mesentery and  had proximal resection line within the ileum and then stapled across the  small bowel using a REGGIE-75 blue load stapler. Next, we mobilized the  white line of Toldt and proceeded starting down at the cecum and the  appendix and then proceeding distally around our hepatic flexure. Once  that was complete, the duodenum was identified and retracted posterior  away from our specimen. We further mobilized the cecum and ascending  colon, hepatic flexure from the retroperitoneum. Once this was  completed, we created a window within the transverse mesocolon removing  the omentum from the transverse colon using a vessel sealer. Next,  retracting the transverse colon anteriorly, we were able to identify the  middle colic vessels. We made a window within the transverse colon  mesentery to the right of the middle colic making sure to preserve those  vessels for the remaining transverse colon. The colon was then  transected with a REGGIE-75 stapler at this area. The mesentery was then  taken down with a vessel sealer. Further hemostasis was needed of the  mesentery, which was obtained with several 3-0 silk stitches. The  specimen was then sent off for pathology.   We then performed a  side-to-side ileocolonic stapled anastomosis taking our proximal small  bowel and our distal transverse colon. We created an enterotomy at the  corner of the staple line and then a colotomy within the tinea of our  transverse colon, created a common channel using a REGGIE-75 stapler and  then used a TA 60 blue load staple to close the common channel. A  crotch stitch was placed with 3-0 Vicryl suture. We closed the  mesentery defect with several 3-0 silk interrupted sutures. There was a  patent lumen once completed. The remaining colon and small bowel all  appeared normal.  Prior to removal minus the area of concern, there  really was minimal signs of any Crohn disease such as short mesentery  creeping fat or any other signs of inflammation. Once our stapled  anastomosis was completed, we turned our attention to the left upper  quadrant, was able to palpate what appeared to be splenic tissue. We  mobilized the spleen starting on the splenophrenic ligament taking those  down bluntly, followed by splenorenal and working our way superior  towards what would be the hilum of the spleen. We removed the  gastrosplenic ligament with a vessel sealer and then went across a  potential splenic hilum even though there was no palpable visible  vessels. With the vessel sealer, I removed the spleen and then I sent  that off as well to pathology. It appeared to be an _____ splenic  specimen. There was some bleeding from short gastric vessels. This was  all controlled with 3-0 Vicryl sutures. The left upper quadrant and  right upper quadrant were then irrigated from the prior gallbladder  resection. We placed some pieces of Surgicel SnoW right in the left  upper quadrant where the area of the splenic remnant had been removed  for further hemostasis and also a piece of Surgicel SnoW within the  gallbladder fossa for further hemostasis.   I placed the remaining  omentum over our stapled anastomosis and then closed the abdomen using 0  looped PDS x2. The skin was closed with staples. I placed sterile  dressing after that. Needle, sponge and instrument counts were reported  correct x2. Dr. Jaspreet Moreno was present and scrubbed throughout the case. The  patient tolerated the procedure well without complication. She was  transferred to recovery area in good condition.         Srinath Ahumada MD    D: 09/18/2020 19:48:12       T: 09/18/2020 19:58:29     [email or web address]  Job#: [id number]     Doc#: [id number]    CC:

## 2020-09-20 LAB
ALBUMIN SERPL-MCNC: 2.5 G/DL (ref 3.5–5.2)
ALP BLD-CCNC: 127 U/L (ref 40–129)
ALT SERPL-CCNC: 45 U/L (ref 0–40)
ANION GAP SERPL CALCULATED.3IONS-SCNC: 13 MMOL/L (ref 7–16)
AST SERPL-CCNC: 60 U/L (ref 0–39)
BILIRUB SERPL-MCNC: <0.2 MG/DL (ref 0–1.2)
BILIRUBIN DIRECT: <0.2 MG/DL (ref 0–0.3)
BILIRUBIN, INDIRECT: ABNORMAL MG/DL (ref 0–1)
BLOOD BANK DISPENSE STATUS: NORMAL
BLOOD BANK PRODUCT CODE: NORMAL
BPU ID: NORMAL
BUN BLDV-MCNC: 20 MG/DL (ref 6–20)
CALCIUM IONIZED: 1.27 MMOL/L (ref 1.15–1.33)
CALCIUM SERPL-MCNC: 8.6 MG/DL (ref 8.6–10.2)
CHLORIDE BLD-SCNC: 101 MMOL/L (ref 98–107)
CO2: 21 MMOL/L (ref 22–29)
CREAT SERPL-MCNC: 1.2 MG/DL (ref 0.7–1.2)
DESCRIPTION BLOOD BANK: NORMAL
GFR AFRICAN AMERICAN: >60
GFR NON-AFRICAN AMERICAN: >60 ML/MIN/1.73
GLUCOSE BLD-MCNC: 156 MG/DL (ref 74–99)
HCT VFR BLD CALC: 20.3 % (ref 37–54)
HCT VFR BLD CALC: 21.5 % (ref 37–54)
HEMOGLOBIN: 6.3 G/DL (ref 12.5–16.5)
HEMOGLOBIN: 7 G/DL (ref 12.5–16.5)
MAGNESIUM: 1.8 MG/DL (ref 1.6–2.6)
MCH RBC QN AUTO: 27.3 PG (ref 26–35)
MCHC RBC AUTO-ENTMCNC: 31 % (ref 32–34.5)
MCV RBC AUTO: 87.9 FL (ref 80–99.9)
METER GLUCOSE: 107 MG/DL (ref 74–99)
METER GLUCOSE: 114 MG/DL (ref 74–99)
METER GLUCOSE: 116 MG/DL (ref 74–99)
METER GLUCOSE: 122 MG/DL (ref 74–99)
METER GLUCOSE: 127 MG/DL (ref 74–99)
METER GLUCOSE: 135 MG/DL (ref 74–99)
METER GLUCOSE: 151 MG/DL (ref 74–99)
PDW BLD-RTO: 18.5 FL (ref 11.5–15)
PHOSPHORUS: 3.7 MG/DL (ref 2.5–4.5)
PLATELET # BLD: 469 E9/L (ref 130–450)
PMV BLD AUTO: 8.9 FL (ref 7–12)
POTASSIUM SERPL-SCNC: 4.5 MMOL/L (ref 3.5–5)
RBC # BLD: 2.31 E12/L (ref 3.8–5.8)
SODIUM BLD-SCNC: 135 MMOL/L (ref 132–146)
TOTAL PROTEIN: 5.4 G/DL (ref 6.4–8.3)
WBC # BLD: 32.8 E9/L (ref 4.5–11.5)

## 2020-09-20 PROCEDURE — 85018 HEMOGLOBIN: CPT

## 2020-09-20 PROCEDURE — 2580000003 HC RX 258: Performed by: SURGERY

## 2020-09-20 PROCEDURE — 80076 HEPATIC FUNCTION PANEL: CPT

## 2020-09-20 PROCEDURE — C9113 INJ PANTOPRAZOLE SODIUM, VIA: HCPCS | Performed by: STUDENT IN AN ORGANIZED HEALTH CARE EDUCATION/TRAINING PROGRAM

## 2020-09-20 PROCEDURE — 84100 ASSAY OF PHOSPHORUS: CPT

## 2020-09-20 PROCEDURE — 83735 ASSAY OF MAGNESIUM: CPT

## 2020-09-20 PROCEDURE — 6360000002 HC RX W HCPCS: Performed by: STUDENT IN AN ORGANIZED HEALTH CARE EDUCATION/TRAINING PROGRAM

## 2020-09-20 PROCEDURE — 2580000003 HC RX 258: Performed by: EMERGENCY MEDICINE

## 2020-09-20 PROCEDURE — 99233 SBSQ HOSP IP/OBS HIGH 50: CPT | Performed by: SURGERY

## 2020-09-20 PROCEDURE — 80048 BASIC METABOLIC PNL TOTAL CA: CPT

## 2020-09-20 PROCEDURE — 85027 COMPLETE CBC AUTOMATED: CPT

## 2020-09-20 PROCEDURE — 36430 TRANSFUSION BLD/BLD COMPNT: CPT

## 2020-09-20 PROCEDURE — 2500000003 HC RX 250 WO HCPCS: Performed by: STUDENT IN AN ORGANIZED HEALTH CARE EDUCATION/TRAINING PROGRAM

## 2020-09-20 PROCEDURE — 2580000003 HC RX 258: Performed by: STUDENT IN AN ORGANIZED HEALTH CARE EDUCATION/TRAINING PROGRAM

## 2020-09-20 PROCEDURE — 85014 HEMATOCRIT: CPT

## 2020-09-20 PROCEDURE — 2000000000 HC ICU R&B

## 2020-09-20 PROCEDURE — 82330 ASSAY OF CALCIUM: CPT

## 2020-09-20 PROCEDURE — 82962 GLUCOSE BLOOD TEST: CPT

## 2020-09-20 PROCEDURE — 36415 COLL VENOUS BLD VENIPUNCTURE: CPT

## 2020-09-20 PROCEDURE — 6370000000 HC RX 637 (ALT 250 FOR IP): Performed by: SURGERY

## 2020-09-20 PROCEDURE — P9016 RBC LEUKOCYTES REDUCED: HCPCS

## 2020-09-20 PROCEDURE — 94770 HC ETCO2 MONITOR DAILY: CPT

## 2020-09-20 RX ORDER — MAGNESIUM SULFATE IN WATER 40 MG/ML
4 INJECTION, SOLUTION INTRAVENOUS ONCE
Status: COMPLETED | OUTPATIENT
Start: 2020-09-20 | End: 2020-09-20

## 2020-09-20 RX ORDER — 0.9 % SODIUM CHLORIDE 0.9 %
20 INTRAVENOUS SOLUTION INTRAVENOUS ONCE
Status: COMPLETED | OUTPATIENT
Start: 2020-09-20 | End: 2020-09-20

## 2020-09-20 RX ORDER — FUROSEMIDE 10 MG/ML
20 INJECTION INTRAMUSCULAR; INTRAVENOUS ONCE
Status: COMPLETED | OUTPATIENT
Start: 2020-09-20 | End: 2020-09-20

## 2020-09-20 RX ORDER — SODIUM CHLORIDE, SODIUM LACTATE, POTASSIUM CHLORIDE, AND CALCIUM CHLORIDE .6; .31; .03; .02 G/100ML; G/100ML; G/100ML; G/100ML
1000 INJECTION, SOLUTION INTRAVENOUS ONCE
Status: COMPLETED | OUTPATIENT
Start: 2020-09-20 | End: 2020-09-20

## 2020-09-20 RX ADMIN — METOPROLOL TARTRATE 5 MG: 5 INJECTION INTRAVENOUS at 15:13

## 2020-09-20 RX ADMIN — PANTOPRAZOLE SODIUM 40 MG: 40 INJECTION, POWDER, FOR SOLUTION INTRAVENOUS at 08:49

## 2020-09-20 RX ADMIN — METHOCARBAMOL 500 MG: 100 INJECTION INTRAMUSCULAR; INTRAVENOUS at 20:15

## 2020-09-20 RX ADMIN — SODIUM CHLORIDE, POTASSIUM CHLORIDE, SODIUM LACTATE AND CALCIUM CHLORIDE: 600; 310; 30; 20 INJECTION, SOLUTION INTRAVENOUS at 10:57

## 2020-09-20 RX ADMIN — Medication 10 ML: at 08:49

## 2020-09-20 RX ADMIN — METOPROLOL TARTRATE 5 MG: 5 INJECTION INTRAVENOUS at 08:49

## 2020-09-20 RX ADMIN — Medication 10 ML: at 20:15

## 2020-09-20 RX ADMIN — SODIUM CHLORIDE 20 ML: 9 INJECTION, SOLUTION INTRAVENOUS at 07:36

## 2020-09-20 RX ADMIN — SODIUM CHLORIDE, POTASSIUM CHLORIDE, SODIUM LACTATE AND CALCIUM CHLORIDE 1000 ML: 600; 310; 30; 20 INJECTION, SOLUTION INTRAVENOUS at 02:05

## 2020-09-20 RX ADMIN — FLUTICASONE PROPIONATE 2 SPRAY: 50 SPRAY, METERED NASAL at 08:49

## 2020-09-20 RX ADMIN — METOPROLOL TARTRATE 5 MG: 5 INJECTION INTRAVENOUS at 20:15

## 2020-09-20 RX ADMIN — METHOCARBAMOL 500 MG: 100 INJECTION INTRAMUSCULAR; INTRAVENOUS at 08:50

## 2020-09-20 RX ADMIN — Medication: at 16:51

## 2020-09-20 RX ADMIN — SODIUM CHLORIDE: 9 INJECTION, SOLUTION INTRAVENOUS at 01:04

## 2020-09-20 RX ADMIN — SODIUM CHLORIDE: 9 INJECTION, SOLUTION INTRAVENOUS at 15:12

## 2020-09-20 RX ADMIN — ENOXAPARIN SODIUM 40 MG: 40 INJECTION SUBCUTANEOUS at 08:49

## 2020-09-20 RX ADMIN — INSULIN LISPRO 3 UNITS: 100 INJECTION, SOLUTION INTRAVENOUS; SUBCUTANEOUS at 04:48

## 2020-09-20 RX ADMIN — MAGNESIUM SULFATE HEPTAHYDRATE 4 G: 40 INJECTION, SOLUTION INTRAVENOUS at 07:18

## 2020-09-20 RX ADMIN — METHOCARBAMOL 500 MG: 100 INJECTION INTRAMUSCULAR; INTRAVENOUS at 15:13

## 2020-09-20 RX ADMIN — SODIUM CHLORIDE: 9 INJECTION, SOLUTION INTRAVENOUS at 08:49

## 2020-09-20 RX ADMIN — SODIUM CHLORIDE 20 ML: 9 INJECTION, SOLUTION INTRAVENOUS at 18:29

## 2020-09-20 RX ADMIN — METOPROLOL TARTRATE 5 MG: 5 INJECTION INTRAVENOUS at 03:06

## 2020-09-20 RX ADMIN — INSULIN GLARGINE 25 UNITS: 100 INJECTION, SOLUTION SUBCUTANEOUS at 07:30

## 2020-09-20 RX ADMIN — FUROSEMIDE 20 MG: 10 INJECTION, SOLUTION INTRAMUSCULAR; INTRAVENOUS at 21:48

## 2020-09-20 RX ADMIN — METHOCARBAMOL 500 MG: 100 INJECTION INTRAMUSCULAR; INTRAVENOUS at 01:50

## 2020-09-20 ASSESSMENT — PAIN DESCRIPTION - PROGRESSION
CLINICAL_PROGRESSION: NOT CHANGED

## 2020-09-20 ASSESSMENT — PAIN SCALES - GENERAL
PAINLEVEL_OUTOF10: 8
PAINLEVEL_OUTOF10: 8
PAINLEVEL_OUTOF10: 0
PAINLEVEL_OUTOF10: 10

## 2020-09-20 ASSESSMENT — PAIN DESCRIPTION - PAIN TYPE: TYPE: ACUTE PAIN

## 2020-09-20 ASSESSMENT — PAIN DESCRIPTION - ORIENTATION: ORIENTATION: MID

## 2020-09-20 ASSESSMENT — PAIN DESCRIPTION - LOCATION: LOCATION: ABDOMEN;BUTTOCKS

## 2020-09-20 NOTE — PROGRESS NOTES
[facility]  SURGICAL INTENSIVE CARE UNIT (SICU)  ATTENDING PHYSICIAN CRITICAL CARE PROGRESS NOTE     I have examined the patient, reviewed the record,and discussed the case with the APN/  Resident. I have reviewed all relevant labs and imaging data. The following summarizes my clinical findings and independent assessment. Date of admission:  9/17/2020    CC: Sepsis     HOSPITAL COURSE:   45y/o M with extension recent history with DKA, MI x 3 that was earlier in the summer and he was treated in [place]. Last month he was in the MICU with sepsis and GI bleed. Cholecystostomy tube was placed and he had upper and lower endoscopy performed. There was a cecal mass, but the pathology was benign. He presented now with SOB and near syncope. He still has his cholecystostomy tube. He also has abdominal pain. He denies any family history of Crohn's disease or UC. He was also seen last month for splenic abscess. He was diagnosed with PE in the beginning of the summer and had an IVC filter placed earlier this month when he started having the GI bleeding. 9/19 Yesterday had cholecystectomy, splenectomy, SBR and Right hemicolectomy   9/20 Received 6L in fluid boluses yesterday into the night, Renal function improving starting to make more urine , States he passed flatus but still 550 from NGT      New Imaging Reviewed:   No new imaging      Physical Exam  HENT:      Head: Normocephalic. Eyes:      Extraocular Movements: Extraocular movements intact. Pupils: Pupils are equal, round, and reactive to light. Neck:      Musculoskeletal: Normal range of motion. Cardiovascular:      Rate and Rhythm: Tachycardia present. Pulmonary:      Effort: Pulmonary effort is normal. No respiratory distress. Abdominal:      General: There is distension ( mild). Tenderness: There is abdominal tenderness ( appropriate). There is no guarding or rebound.       Comments: Midline dressing mild stripe through    Musculoskeletal: Normal range of motion. Skin:     General: Skin is warm. Neurological:      Mental Status: He is alert. Psychiatric:         Mood and Affect: Mood normal.         Assessment   Active Problems:    SIMÓN (acute kidney injury) ([facility] [address]    Septicemia ([facility] [address]    Intra-abdominal infection  Resolved Problems:    * No resolved hospital problems.  *      Plan   GI:  NPO  NGT Suction likely clamp and clears tomorrow,  path pending   Neuro:  Cymbalta on hold , Robaxin,  dilaudid PCA , robaxin   Renal: 150cc LR, SIMÓN Cr 1.2 improved  -Lactate normalized, replace Mg  , Monitor Urine Output, Daily CBC,BMP, Mg,Phos, ionized Ca    Musculoskeletal: WBAT all extremities ,  AM-PAC Score pending   Pulmonary: Aggressive pulmonary hygiene , SMI , Monitor RR and Maintain SpO2 > 92%  ID:  No indication for Abx , If suspecting infection will get procalcitonin likely WBC increasing secondary to splenectomy,  Monitor leukocytosis and Monitor Fever Curve Post splenectomy vaccinations prior to DC   Heme: Transfusion secondary to hb <7    Cardiac: Monitor Hemodynamics Home metoprolol ( IV)  , statin ( once enteral intact)   Endocrine: Hold  metformin,  Lantus 25U Angelika dose SSI - Will likely need to increase once eating       DVT Prophylaxis: PCDs, SQ Lovenox   Ulcer Prophylaxis: Home PPI   Tubes and Lines: Continue Central Line remove and place KALLI Langford for strict input and out put , remove a line , continue NGT   Seizure proph:     No Indication  Ancillary consults:   PT/OT    Family Update:         As available   CODE Status:       Full Code    Dispo:  Likely transfer from SICU today to Telemetry ( Hx 3 MIs)     Tammy King MD

## 2020-09-20 NOTE — PROGRESS NOTES
Hospitalist Progress Note      SYNOPSIS: Patient admitted on 2020 presented with abdominal pain, bleeding from rectum, and dizziness. He does admit to some shortness of breath that also started day of presentation that is associated with exertion. He denies any chest pain. He was recently admitted previously for anemia from GI bleed due to recently diagnosed colon mass. He has a history of bilateral pulmonary embolism and IVC filter was recently placed in the setting of blood clots with recurrent bleeding. He also has JULIO drain from being previously admitted to MICU with sepsis from gallbladder or splenic abscess. Patient admitted to SICU for sepsis and abdominal infection. - Patient underwent ex lap with cholecystectomy, right hemicolectomy with small bowel resection and side to side ileocolonic stabled anastomosis, splenectomy, omentectomy. SUBJECTIVE:  Stable overnight. No other overnight issues reported. Patient seen and examined  Records reviewed. - Patient underwent ex lap with cholecystectomy, right hemicolectomy with small bowel resection and side to side ileocolonic stabled anastomosis, splenectomy, omentectomy. He received 1 unit PRBC this am due to hemoglobin 6.3  Admits to fatigue        Temp (24hrs), Av °F (36.7 °C), Min:97.1 °F (36.2 °C), Max:99 °F (37.2 °C)    DIET: Diet NPO, After Midnight  CODE: Full Code    Intake/Output Summary (Last 24 hours) at 2020 1056  Last data filed at 2020 1000  Gross per 24 hour   Intake 8022.2 ml   Output 1375 ml   Net 6647.2 ml       Review of Systems  All bolded are positive; please see HPI  General:  Fever, chills, diaphoresis, fatigue, malaise, night sweats, weight loss  Psychological:  Anxiety, disorientation, hallucinations. ENT:  Epistaxis, headaches, vertigo, visual changes. Cardiovascular:  Chest pain, irregular heartbeats, palpitations, paroxysmal nocturnal dyspnea.   Respiratory:  Shortness of breath, coughing, sputum production, hemoptysis, wheezing, orthopnea. Gastrointestinal:  Nausea, vomiting, diarrhea, heartburn, constipation, abdominal pain, hematemesis, hematochezia, melena, acholic stools  Genito-Urinary:  Dysuria, urgency, frequency, hematuria  Musculoskeletal:  Joint pain, joint stiffness, joint swelling, muscle pain  Neurology:  Headache, focal neurological deficits, weakness, numbness, paresthesia  Derm:  Rashes, ulcers, excoriations, bruising  Extremities:  Decreased ROM, peripheral edema, mottling      OBJECTIVE:     /77   Pulse 102   Temp 97.9 °F (36.6 °C) (Bladder)   Resp 16   Ht 6' (1.829 m)   Wt 273 lb 5.9 oz (124 kg)   SpO2 93%   BMI 37.08 kg/m²   General Appearance:  awake, alert, and oriented to person, place, time, and purpose; appears stated age and cooperative; no apparent distress no labored breathing  HEENT:  NCAT; PERRL; conjunctiva pink, sclera clear  Neck:  no adenopathy, bruit, JVD, tenderness, masses, or nodules; supple, symmetrical, trachea midline, thyroid not enlarged  Lung:  clear to auscultation bilaterally; no use of accessory muscles; no rhonchi, rales, or crackles  Heart:  tachycardic without murmur, rub, or gallop  Abdomen:  soft, post surgical abdomen ; normoactive bowel sounds; no organomegaly  Extremities:  extremities normal, atraumatic, no cyanosis   Musculokeletal:  no joint swelling, no muscle tenderness. ROM normal in all joints of extremities. 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 possible Crohns-General surgery following. 9/18- Patient underwent ex lap with cholecystectomy, right hemicolectomy with small bowel resection and side to side ileocolonic stabled anastomosis, splenectomy, omentectomy.   · Acute acalculous cholecystitis- s/p cholecystectomy  · Acute anemia secondary to GI bleed- Received 1 unit PRBC this am. Transfuse <7  · Known bilateral pulmonary embolism s/p IVC filter placement due to recurrent bleeding  · Hypertension- Currently controlled continue to monitor  · Type 2 diabetes mellitus- Continue insulin  · SIMÓN, resolved  · Hypomagnesemia- Replace  · S/p splenectomy- post splenectomy vaccines will be needed prior to discharge         DISPOSITION: Continue current plan of care    Medications:  REVIEWED DAILY    Infusion Medications    lactated ringers      lactated ringers      HYDROmorphone      dextrose      lactated ringers 150 mL/hr at 09/19/20 2148     Scheduled Medications    magnesium sulfate  4 g Intravenous Once    methocarbamol IVPB  500 mg Intravenous Q6H    insulin glargine  25 Units Subcutaneous Daily    insulin lispro  0-18 Units Subcutaneous Q4H    enoxaparin  40 mg Subcutaneous Daily    metoprolol  5 mg Intravenous Q6H    fluticasone  2 spray Nasal Daily    sodium chloride   Intravenous Q8H    sodium chloride flush  10 mL Intravenous 2 times per day    pantoprazole  40 mg Intravenous Daily     PRN Meds: naloxone, glucose, dextrose, glucagon (rDNA), dextrose, acetaminophen **OR** acetaminophen, sodium chloride flush, [DISCONTINUED] promethazine **OR** ondansetron    Labs:     Recent Labs     09/18/20 2000 09/19/20  0200 09/20/20  0440 09/20/20  1005   WBC 23.9* 26.3* 32.8*  --    HGB 8.2* 7.7* 6.3* 7.0*   HCT 26.3* 25.0* 20.3* 21.5*   * 452* 469*  --        Recent Labs     09/19/20  0200 09/19/20  1740 09/20/20  0440    135 135   K 4.7 4.9 4.5   CL 99 99 101   CO2 17* 23 21*   BUN 19 22* 20   CREATININE 1.5* 1.5* 1.2   CALCIUM 8.5* 9.0 8.6   PHOS 4.9* 4.5 3.7       Recent Labs     09/17/20  1440  09/19/20  0200 09/19/20  1740 09/20/20  0440   PROT 6.9   < > 5.6* 5.9* 5.4*   ALKPHOS 143*   < > 134* 134* 127   ALT 13   < > 38 44* 45*   AST 15   < > 76* 59* 60*   BILITOT <0.2   < > <0.2 0.2 <0. 2   LIPASE 6*  --   --   --   --     < > = values in this interval not displayed. Recent Labs     09/17/20  1440   INR 1.2       Recent Labs     09/17/20  1440   TROPONINI 0.03       Chronic labs:    Lab Results   Component Value Date    CHOL 94 09/18/2020    TRIG 151 (H) 09/18/2020    HDL 33 09/18/2020    LDLCALC 31 09/18/2020    TSH 0.833 09/18/2020    INR 1.2 09/17/2020    LABA1C 5.5 09/18/2020       Radiology: REVIEWED DAILY    +++++++++++++++++++++++++++++++++++++++++++++++++  Jessica Goodman Physician - [address] [place], [place]  +++++++++++++++++++++++++++++++++++++++++++++++++  NOTE: This report was transcribed using voice recognition software. Every effort was made to ensure accuracy; however, inadvertent computerized transcription errors may be present.

## 2020-09-21 LAB
ALBUMIN SERPL-MCNC: 2.6 G/DL (ref 3.5–5.2)
ALP BLD-CCNC: 144 U/L (ref 40–129)
ALT SERPL-CCNC: 50 U/L (ref 0–40)
ANION GAP SERPL CALCULATED.3IONS-SCNC: 14 MMOL/L (ref 7–16)
AST SERPL-CCNC: 42 U/L (ref 0–39)
BILIRUB SERPL-MCNC: 0.3 MG/DL (ref 0–1.2)
BILIRUBIN DIRECT: <0.2 MG/DL (ref 0–0.3)
BILIRUBIN, INDIRECT: ABNORMAL MG/DL (ref 0–1)
BLOOD BANK DISPENSE STATUS: NORMAL
BLOOD BANK DISPENSE STATUS: NORMAL
BLOOD BANK PRODUCT CODE: NORMAL
BLOOD BANK PRODUCT CODE: NORMAL
BPU ID: NORMAL
BPU ID: NORMAL
BUN BLDV-MCNC: 12 MG/DL (ref 6–20)
CALCIUM IONIZED: 1.32 MMOL/L (ref 1.15–1.33)
CALCIUM SERPL-MCNC: 8.7 MG/DL (ref 8.6–10.2)
CHLORIDE BLD-SCNC: 99 MMOL/L (ref 98–107)
CO2: 24 MMOL/L (ref 22–29)
CREAT SERPL-MCNC: 0.7 MG/DL (ref 0.7–1.2)
DESCRIPTION BLOOD BANK: NORMAL
DESCRIPTION BLOOD BANK: NORMAL
GFR AFRICAN AMERICAN: >60
GFR NON-AFRICAN AMERICAN: >60 ML/MIN/1.73
GLUCOSE BLD-MCNC: 109 MG/DL (ref 74–99)
HCT VFR BLD CALC: 24.8 % (ref 37–54)
HEMOGLOBIN: 7.9 G/DL (ref 12.5–16.5)
MAGNESIUM: 2.1 MG/DL (ref 1.6–2.6)
MCH RBC QN AUTO: 28 PG (ref 26–35)
MCHC RBC AUTO-ENTMCNC: 31.9 % (ref 32–34.5)
MCV RBC AUTO: 87.9 FL (ref 80–99.9)
METER GLUCOSE: 108 MG/DL (ref 74–99)
METER GLUCOSE: 115 MG/DL (ref 74–99)
METER GLUCOSE: 168 MG/DL (ref 74–99)
METER GLUCOSE: 70 MG/DL (ref 74–99)
METER GLUCOSE: 82 MG/DL (ref 74–99)
METER GLUCOSE: 98 MG/DL (ref 74–99)
PDW BLD-RTO: 17.4 FL (ref 11.5–15)
PHOSPHORUS: 3.2 MG/DL (ref 2.5–4.5)
PLATELET # BLD: 526 E9/L (ref 130–450)
PMV BLD AUTO: 8.8 FL (ref 7–12)
POTASSIUM SERPL-SCNC: 3.7 MMOL/L (ref 3.5–5)
RBC # BLD: 2.82 E12/L (ref 3.8–5.8)
SODIUM BLD-SCNC: 137 MMOL/L (ref 132–146)
TOTAL PROTEIN: 5.6 G/DL (ref 6.4–8.3)
WBC # BLD: 29.4 E9/L (ref 4.5–11.5)

## 2020-09-21 PROCEDURE — U0003 INFECTIOUS AGENT DETECTION BY NUCLEIC ACID (DNA OR RNA); SEVERE ACUTE RESPIRATORY SYNDROME CORONAVIRUS 2 (SARS-COV-2) (CORONAVIRUS DISEASE [COVID-19]), AMPLIFIED PROBE TECHNIQUE, MAKING USE OF HIGH THROUGHPUT TECHNOLOGIES AS DESCRIBED BY CMS-2020-01-R: HCPCS

## 2020-09-21 PROCEDURE — 84100 ASSAY OF PHOSPHORUS: CPT

## 2020-09-21 PROCEDURE — 85027 COMPLETE CBC AUTOMATED: CPT

## 2020-09-21 PROCEDURE — 2500000003 HC RX 250 WO HCPCS: Performed by: STUDENT IN AN ORGANIZED HEALTH CARE EDUCATION/TRAINING PROGRAM

## 2020-09-21 PROCEDURE — 2580000003 HC RX 258: Performed by: STUDENT IN AN ORGANIZED HEALTH CARE EDUCATION/TRAINING PROGRAM

## 2020-09-21 PROCEDURE — 6370000000 HC RX 637 (ALT 250 FOR IP): Performed by: INTERNAL MEDICINE

## 2020-09-21 PROCEDURE — 80048 BASIC METABOLIC PNL TOTAL CA: CPT

## 2020-09-21 PROCEDURE — 2700000000 HC OXYGEN THERAPY PER DAY

## 2020-09-21 PROCEDURE — 97161 PT EVAL LOW COMPLEX 20 MIN: CPT

## 2020-09-21 PROCEDURE — 6360000002 HC RX W HCPCS: Performed by: STUDENT IN AN ORGANIZED HEALTH CARE EDUCATION/TRAINING PROGRAM

## 2020-09-21 PROCEDURE — C9113 INJ PANTOPRAZOLE SODIUM, VIA: HCPCS | Performed by: STUDENT IN AN ORGANIZED HEALTH CARE EDUCATION/TRAINING PROGRAM

## 2020-09-21 PROCEDURE — 36415 COLL VENOUS BLD VENIPUNCTURE: CPT

## 2020-09-21 PROCEDURE — 82330 ASSAY OF CALCIUM: CPT

## 2020-09-21 PROCEDURE — 6370000000 HC RX 637 (ALT 250 FOR IP): Performed by: SURGERY

## 2020-09-21 PROCEDURE — 82962 GLUCOSE BLOOD TEST: CPT

## 2020-09-21 PROCEDURE — 94770 HC ETCO2 MONITOR DAILY: CPT

## 2020-09-21 PROCEDURE — 80076 HEPATIC FUNCTION PANEL: CPT

## 2020-09-21 PROCEDURE — 2060000000 HC ICU INTERMEDIATE R&B

## 2020-09-21 PROCEDURE — 99024 POSTOP FOLLOW-UP VISIT: CPT | Performed by: SURGERY

## 2020-09-21 PROCEDURE — 97530 THERAPEUTIC ACTIVITIES: CPT

## 2020-09-21 PROCEDURE — 83735 ASSAY OF MAGNESIUM: CPT

## 2020-09-21 RX ADMIN — Medication 10 ML: at 21:00

## 2020-09-21 RX ADMIN — ACETAMINOPHEN 650 MG: 325 TABLET, FILM COATED ORAL at 08:48

## 2020-09-21 RX ADMIN — METHOCARBAMOL 500 MG: 100 INJECTION INTRAMUSCULAR; INTRAVENOUS at 15:37

## 2020-09-21 RX ADMIN — Medication: at 10:44

## 2020-09-21 RX ADMIN — METHOCARBAMOL 500 MG: 100 INJECTION INTRAMUSCULAR; INTRAVENOUS at 21:00

## 2020-09-21 RX ADMIN — ENOXAPARIN SODIUM 40 MG: 40 INJECTION SUBCUTANEOUS at 08:36

## 2020-09-21 RX ADMIN — PANTOPRAZOLE SODIUM 40 MG: 40 INJECTION, POWDER, FOR SOLUTION INTRAVENOUS at 08:36

## 2020-09-21 RX ADMIN — METHOCARBAMOL 500 MG: 100 INJECTION INTRAMUSCULAR; INTRAVENOUS at 01:35

## 2020-09-21 RX ADMIN — METHOCARBAMOL 500 MG: 100 INJECTION INTRAMUSCULAR; INTRAVENOUS at 08:36

## 2020-09-21 RX ADMIN — METOPROLOL TARTRATE 5 MG: 5 INJECTION INTRAVENOUS at 15:37

## 2020-09-21 RX ADMIN — METOPROLOL TARTRATE 5 MG: 5 INJECTION INTRAVENOUS at 03:04

## 2020-09-21 RX ADMIN — METOPROLOL TARTRATE 5 MG: 5 INJECTION INTRAVENOUS at 21:00

## 2020-09-21 RX ADMIN — INSULIN LISPRO 3 UNITS: 100 INJECTION, SOLUTION INTRAVENOUS; SUBCUTANEOUS at 12:40

## 2020-09-21 RX ADMIN — METOPROLOL TARTRATE 5 MG: 5 INJECTION INTRAVENOUS at 08:36

## 2020-09-21 RX ADMIN — Medication 10 ML: at 08:48

## 2020-09-21 ASSESSMENT — PAIN SCALES - GENERAL
PAINLEVEL_OUTOF10: 6
PAINLEVEL_OUTOF10: 8
PAINLEVEL_OUTOF10: 7
PAINLEVEL_OUTOF10: 10
PAINLEVEL_OUTOF10: 9
PAINLEVEL_OUTOF10: 7
PAINLEVEL_OUTOF10: 8

## 2020-09-21 ASSESSMENT — PAIN DESCRIPTION - PROGRESSION
CLINICAL_PROGRESSION: NOT CHANGED

## 2020-09-21 ASSESSMENT — PAIN DESCRIPTION - LOCATION: LOCATION: ABDOMEN

## 2020-09-21 ASSESSMENT — PAIN DESCRIPTION - DESCRIPTORS: DESCRIPTORS: ACHING;CONSTANT;DISCOMFORT

## 2020-09-21 ASSESSMENT — PAIN DESCRIPTION - PAIN TYPE: TYPE: ACUTE PAIN

## 2020-09-21 ASSESSMENT — PAIN DESCRIPTION - ONSET: ONSET: ON-GOING

## 2020-09-21 ASSESSMENT — PAIN - FUNCTIONAL ASSESSMENT: PAIN_FUNCTIONAL_ASSESSMENT: PREVENTS OR INTERFERES SOME ACTIVE ACTIVITIES AND ADLS

## 2020-09-21 ASSESSMENT — PAIN DESCRIPTION - FREQUENCY: FREQUENCY: CONTINUOUS

## 2020-09-21 NOTE — DISCHARGE INSTR - COC
N/A 5/7/2018    COLONOSCOPY DIAGNOSTIC performed by Siria Lindquist MD at [address] [place] [address] Left 2014    Dr. Madeline Grayson ARTHROSCOPY Left 11 21 14   [address] [facility],[address] [facility], [facility] SURGERY  2001 &2007    RIGHT AND LEFT repair of tears    TOTAL HIP ARTHROPLASTY Right 10/31/2016    Total right hip  Nola Gomez MD    UPPER GASTROINTESTINAL ENDOSCOPY N/A 9/4/2020    EGD DIAGNOSTIC ONLY performed by Navi Barajas MD at [facility] 1  2007    LEFT WRIST       Immunization History:   Immunization History   Administered Date(s) Administered    Influenza Virus Vaccine 11/23/2015, 09/26/2019    Influenza, Cinthya Bays, IM, PF (6 mo and older Fluzone, Flulaval, Fluarix, and 3 yrs and older Afluria) 10/04/2016, 10/26/2017, 09/26/2019    Pneumococcal Polysaccharide ([id number]) 10/04/2016, 07/08/2019    Tdap (Boostrix, Adacel) 10/04/2016       Active Problems:  Patient Active Problem List   Diagnosis Code    Neuropathic pain M79.2    Lumbar spondylosis M47.816    Osteoarthritis M19.90    Insomnia G47.00    Fibromyalgia M79.7    Vitamin D deficiency E55.9    Essential hypertension I10    Allergic rhinitis J30.9    Lumbar radiculopathy M54.16    Osteoarthritis of spine with radiculopathy, lumbar region M47.26    Class 1 obesity in adult E66.9    Lumbar disc herniation M51.26    Type 2 diabetes mellitus ([facility] [address] E11.9    Primary osteoarthritis of left hip M16.12    Primary osteoarthritis of right hip M16.11    Cellulitis of foot L03.119    Neuropathy G62.9    Cellulitis, wound, post-operative T81.49XA    Left leg swelling M79.89    SIRS (systemic inflammatory response syndrome) (HCC) R65.10    Cellulitis of sacral region L03.319    Rectus diastasis M62.08    Recurrent unilateral inguinal hernia K40.91    Enterocolitis K52.9    Decubitus ulcer of sacral area L89.159    Abnormal findings on diagnostic imaging of gall bladder R93.2    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    Cyst 09/21/20 0936   Wound Assessment Other (Comment) 09/21/20 0936   Drainage Amount None 09/21/20 0936   Drainage Description Serosanguinous 09/21/20 0936   Loli-wound Assessment Other (Comment) 09/21/20 0936   Number of days: 51       Wound 08/05/20 Buttocks Right; Inner (Active)   Dressing Status Clean;Dry; Intact 09/21/20 0936   Dressing/Treatment Other (comment) 09/21/20 0936   Wound Assessment Red;Pink 09/21/20 0936   Drainage Amount Scant 09/21/20 0936   Drainage Description Serosanguinous 09/21/20 0936   Loli-wound Assessment Red;Pink 09/21/20 0936   Number of days: 47       Wound 08/05/20 Buttocks Left; Inner (Active)   Dressing Status Clean;Dry; Intact 09/21/20 0936   Dressing/Treatment Other (comment) 09/21/20 0936   Wound Assessment Red;Pink 09/21/20 0936   Drainage Amount Scant 09/21/20 0936   Drainage Description Serosanguinous 09/21/20 0936   Loli-wound Assessment Red;Pink 09/21/20 0936   Number of days: 47       Wound 09/17/20 Coccyx Tunneling (Active)   Dressing Status Clean;Dry; Intact 09/21/20 0936   Dressing Changed Changed/New 09/19/20 0943   Dressing Change Due 09/19/20 09/20/20 1800   Wound Length (cm) 1.5 cm 09/17/20 2230   Wound Width (cm) 2 cm 09/17/20 2230   Wound Surface Area (cm^2) 3 cm^2 09/17/20 2230   Wound Assessment Other (Comment) 09/21/20 0936   Drainage Amount None 09/21/20 0936   Drainage Description Serosanguinous 09/21/20 0936   Loli-wound Assessment Other (Comment) 09/21/20 0936   Number of days: 3        Elimination:  Continence:   · Bowel: Yes  · Bladder: Yes  Urinary Catheter: None   Colostomy/Ileostomy/Ileal Conduit: No       Date of Last BM: 9/26/2020    Intake/Output Summary (Last 24 hours) at 9/21/2020 1109  Last data filed at 9/21/2020 9603  Gross per 24 hour   Intake 4790.5 ml   Output 1805 ml   Net 2985.5 ml     I/O last 3 completed shifts:   In: 7592 [I.V.:3884; Blood:785; IV Piggyback:469]  Out: 1970 [Urine:1520; Emesis/NG output:450]    Safety Concerns: None    Impairments/Disabilities:      None    Nutrition Therapy:  Current Nutrition Therapy:   - Oral Diet:  Low Fiber    Routes of Feeding: Oral  Liquids: Thin Liquids  Daily Fluid Restriction: no  Last Modified Barium Swallow with Video (Video Swallowing Test): not done    Treatments at the Time of Hospital Discharge:   Respiratory Treatments: See [facility]  Oxygen Therapy:  is not on home oxygen therapy. Ventilator:    - No ventilator support    Rehab Therapies: Physical Therapy and Occupational Therapy  Weight Bearing Status/Restrictions: No weight bearing restirctions  Other Medical Equipment (for information only, NOT a DME order):  walker  Other Treatments: ***    Patient's personal belongings (please select all that are sent with patient):  None    RN SIGNATURE:  Electronically signed by Rony Jewlel RN on 9/26/20 at 5:28 PM EDT    CASE MANAGEMENT/SOCIAL WORK SECTION    Inpatient Status Date: ***    Readmission Risk Assessment Score:  Readmission Risk              Risk of Unplanned Readmission:        25           Discharging to Facility/ Agency   · Name: [facility]fer  · Address:  · Phone:  · Fax:    Dialysis Facility (if applicable)   · Name:  · Address:  · Dialysis Schedule:  · Phone:  · Fax:    / signature: Electronically signed by OSMAN Chung on 9/21/20 at 11:09 AM EDT    PHYSICIAN SECTION    Prognosis: {Prognosis:6417836828}    Condition at Discharge: Yudy8 Natalie Ramírez Patient Condition:[id number]}    Rehab Potential (if transferring to Rehab): {Prognosis:6963729023}    Recommended Labs or Other Treatments After Discharge: ***    Physician Certification: I certify the above information and transfer of Chivo Del Valle II  is necessary for the continuing treatment of the diagnosis listed and that he requires {Admit to Appropriate Level of Care:57032} for {GREATER/LESS:057890650} 30 days.      Update Admission H&P: {CHP DME Changes in PVJQL:420540151}    PHYSICIAN SIGNATURE:  Electronically signed by Jarrell Jorgensen MD on 9/26/20 at 3:24 PM EDT

## 2020-09-21 NOTE — PROGRESS NOTES
Physical Therapy  Physical Therapy Initial Assessment     Name: Shalini Avendano  : 1975  MRN: [id number]    Referring Provider:  Christ Varghese DO     Date of Service: 2020    Evaluating PT:  Jp Chiang, PT, DPT. [id number]    Room #:  0801/7178-G  Diagnosis:  SIMÓN   Reason for admission:  Dizziness, rectal bleed  Precautions:  Falls, PCA pump, JULIO drain, abdominal precautions, NGT, R wrist drop  Pertinent PMHx: OA, HTN, HLD, DM, fibromyalgia, depression, back pain, SIMÓN  Procedures:  ex lap, cholecystectomy, bowel resection, R hemicolectomy  Equipment Recommendations:  TBD    SUBJECTIVE:  Pt admitted from [address] [place] [address]. States he is ambulatory using a cane or wheelchair user for longer distances. OBJECTIVE:   Initial Evaluation  Date:  Treatment   Short Term/ Long Term   Goals   AM-PAC 6 Clicks      Was pt agreeable to Eval/treatment? Yes      Does pt have pain?  \"30/10\" abdomen      Bed Mobility  Rolling: SBA  Supine to sit: Corrie  Sit to supine: Corrie  Scooting: SBA  Independent    Transfers Sit to stand: NT  Stand to sit: NT  Stand pivot: NT  Independent    Ambulation    NT, pt refused d/t pain     >200 feet with AAD Mod I   Stair negotiation: ascended and descended  NT  TBD   ROM BUE:  See OT eval   BLE:  WFL     Strength BUE:  See OT eval   BLE:  knee ext 5/5  Ankle DF 5/5  Increase by 1/3 MMT grade    Balance Sitting EOB:  SBA  Dynamic Standing:  NT  Sitting EOB:  indep  Dynamic Standing:  indep     -Pt is A & O x 3  -Sensation:  unremarkable   -Edema:  unremarkable     Therapeutic Exercises:  functional activity     Patient education  Pt educated on safety, sequencing of transfers, and role of PT    Patient response to education:   Pt verbalized understanding Pt demonstrated skill Pt requires further education in this area   Yes  Partial Yes      ASSESSMENT:    Comments:  Pt received sitting in bed and agreeable to PT session   Pt is self limiting with minimal motivation d/t his significant pain. Able to sit up to EOB with assist to trunk -- also required HOB to be elevated significantly and use of bedrail to pull self up. Sitting balance fair at EOB with no LOB. Sat EOB x7 minutes. Pt declined attempting to stand or ambulate. Was agreeable to scooting along bedside which pt completed without help using UEs and a partial height stand. Assisted back to bed to end session. Pt with all needs met and call light in reach. Pt would benefit from continued PT POC to address functional deficits described above. Treatment:  Patient practiced and was instructed in the following treatment:     Patient education provided continuously throughout session for sequencing, safety maintenance, and improving any deficits found during the evaluation.  Bed mobility training - pt given verbal and tactile cues to facilitate proper sequencing and safety during rolling and supine>sit as well as provided with physical assistance to complete task     Sitting EOB for >7 minutes for upright tolerance, postural awareness and BLE ROM   · Skilled positioning - Pt placed in upright sitting position to facilitate upright posture, joint and skin integrity, and interaction with environment. Pt's/ family goals   1. None stated     Patient and or family understand(s) diagnosis, prognosis, and plan of care. Yes     PLAN:    Current Treatment Recommendations   [x] Strengthening     [] ROM   [x] Balance Training   [x] Endurance Training   [x] Transfer Training   [x] Gait Training   [] Stair Training   [x] Positioning   [] Safety and Education Training   [] Patient/Caregiver Education   [] HEP  [] Other     Frequency of treatments: 2-5x/week x 1-2 weeks.     Time in  0750  Time out  0810    Total Treatment Time 12 minutes     Evaluation Time includes thorough review of current medical information, gathering information on past medical history/social history and prior level of function, completion of standardized testing/informal observation of tasks, assessment of data and education on plan of care and goals.     CPT codes:  [x] Low Complexity PT evaluation 34678  [] Moderate Complexity PT evaluation 35093  [] High Complexity PT evaluation 39338  [] PT Re-evaluation 35817  [] Gait training 84119 - minutes  [] Manual therapy 41297 - minutes  [x] Therapeutic activities 48173 12 minutes  [] Therapeutic exercises 70526 - minutes  [] Neuromuscular reeducation 05906 - minutes     Donya Gill, PT, DPT  [id number]

## 2020-09-21 NOTE — FLOWSHEET NOTE
Patient Wound Care (Initial consult) 4503A    Admit Date: 9/17/2020  2:10 PM    Reason for consult: Sacrum    Significant history:  Per H&P    Pre-Op Diagnosis: chronic cholecystitis, Small bowel fistula/abscesss      Post-Op Diagnosis: small bowel masses, R colonic mass, Chronic cholecystitis, splenic infarct and cyst.        Procedure(s):  LAPAROTOMY EXPLORATORY, CHOLECYSTECTOMY, BOWEL RESECTION, right darian colectomy, partial omentectomy    Findings:      09/21/20 1456   Wound 07/31/20 Sacrum   Date First Assessed/Time First Assessed: 07/31/20 2200   Present on Hospital Admission: Yes  Location: Sacrum   Wound Image    Wound Pressure Stage  4   Dressing Changed Changed/New   Dressing/Treatment Alginate  (stratasorb)   Wound Length (cm) 2 cm   Wound Width (cm) 1 cm   Wound Depth (cm) 1.6 cm   Wound Surface Area (cm^2) 2 cm^2   Change in Wound Size % (l*w) 85.19   Wound Volume (cm^3) 3.2 cm^3   Wound Healing % 92   Wound Assessment Pink   Drainage Amount Scant   Drainage Description Serosanguinous   Odor None      **Informed Consent**    The patient has given verbal consent to have photos taken of wound and inserted into their chart as part of their permanent medical record for purposes of documentation, treatment management and/or medical review. All Images taken on 9/21/20 of patient name: Sandra Patient were transmitted and stored on secured Proficient located within [facility] by a registered [employer] Mobile Application Device.        Impression:  Sacrum: stage 4 pressure    Plan:Opticell, stratasorb  Patient will need continued preventative care      Car Leroy 9/21/2020 2:59 PM

## 2020-09-21 NOTE — PLAN OF CARE
Problem: Falls - Risk of:  Goal: Will remain free from falls  Description: Will remain free from falls  9/20/2020 2308 by Aleshia Arana RN  Outcome: Met This Shift     Problem: Pain:  Goal: Pain level will decrease  Description: Pain level will decrease  9/20/2020 2308 by Aleshia Arana RN  Outcome: Met This Shift

## 2020-09-21 NOTE — PROGRESS NOTES
Hospitalist Progress Note      SYNOPSIS: Patient admitted on 2020 presented with abdominal pain, bleeding from rectum, and dizziness. He does admit to some shortness of breath that also started day of presentation that is associated with exertion. He denies any chest pain. He was recently admitted previously for anemia from GI bleed due to recently diagnosed colon mass. He has a history of bilateral pulmonary embolism and IVC filter was recently placed in the setting of blood clots with recurrent bleeding. He also has JULIO drain from being previously admitted to MICU with sepsis from gallbladder or splenic abscess. Patient admitted to SICU for sepsis and abdominal infection. - Patient underwent ex lap with cholecystectomy, right hemicolectomy with small bowel resection and side to side ileocolonic stabled anastomosis, splenectomy, omentectomy. SUBJECTIVE:  Stable overnight. No other overnight issues reported. Patient seen and examined  Records reviewed. - Patient underwent ex lap with cholecystectomy, right hemicolectomy with small bowel resection and side to side ileocolonic stabled anastomosis, splenectomy, omentectomy. States he feels sore post surgery        Temp (24hrs), Av.5 °F (36.9 °C), Min:97.9 °F (36.6 °C), Max:99.1 °F (37.3 °C)    DIET: DIET CLEAR LIQUID;  CODE: Full Code    Intake/Output Summary (Last 24 hours) at 2020 0723  Last data filed at 2020 0657  Gross per 24 hour   Intake 5138 ml   Output 1970 ml   Net 3168 ml       Review of Systems  All bolded are positive; please see HPI  General:  Fever, chills, diaphoresis, fatigue, malaise, night sweats, weight loss  Psychological:  Anxiety, disorientation, hallucinations. ENT:  Epistaxis, headaches, vertigo, visual changes. Cardiovascular:  Chest pain, irregular heartbeats, palpitations, paroxysmal nocturnal dyspnea.   Respiratory:  Shortness of breath, coughing, sputum production, hemoptysis, wheezing, orthopnea. Gastrointestinal:  Nausea, vomiting, diarrhea, heartburn, constipation, abdominal pain, hematemesis, hematochezia, melena, acholic stools  Genito-Urinary:  Dysuria, urgency, frequency, hematuria  Musculoskeletal:  Joint pain, joint stiffness, joint swelling, muscle pain  Neurology:  Headache, focal neurological deficits, weakness, numbness, paresthesia  Derm:  Rashes, ulcers, excoriations, bruising  Extremities:  Decreased ROM, peripheral edema, mottling      OBJECTIVE:     /74   Pulse 108   Temp 99.1 °F (37.3 °C) (Oral)   Resp 18   Ht 6' (1.829 m)   Wt 273 lb 5.9 oz (124 kg)   SpO2 95%   BMI 37.08 kg/m²   General Appearance:  awake, alert, and oriented to person, place, time, and purpose; appears stated age and cooperative; no apparent distress no labored breathing  HEENT:  NCAT; PERRL; conjunctiva pink, sclera clear  Neck:  no adenopathy, bruit, JVD, tenderness, masses, or nodules; supple, symmetrical, trachea midline, thyroid not enlarged  Lung:  clear to auscultation bilaterally; no use of accessory muscles; no rhonchi, rales, or crackles  Heart:  tachycardic without murmur, rub, or gallop  Abdomen:  soft, post surgical abdomen ; normoactive bowel sounds; no organomegaly  Extremities:  extremities normal, atraumatic, no cyanosis   Musculokeletal:  no joint swelling, no muscle tenderness. ROM normal in all joints of extremities. 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 possible Crohns-General surgery following. 9/18- Patient underwent ex lap with cholecystectomy, right hemicolectomy with small bowel resection and side to side ileocolonic stabled anastomosis, splenectomy, omentectomy.   · Acute acalculous cholecystitis- s/p cholecystectomy  · Acute anemia secondary to GI bleed- Transfuse <7  · Known bilateral pulmonary embolism s/p IVC filter placement due to recurrent bleeding  · Hypertension- Currently controlled continue to monitor  · Type 2 diabetes mellitus- Continue insulin  · SIMÓN, resolved  · Hypomagnesemia- Replace  · S/p splenectomy- **post splenectomy vaccines will be needed prior to discharge**         DISPOSITION: Continue current plan of care    Medications:  REVIEWED DAILY    Infusion Medications    lactated ringers      lactated ringers      HYDROmorphone      dextrose      lactated ringers 75 mL/hr at 09/21/20 7608     Scheduled Medications    methocarbamol IVPB  500 mg Intravenous Q6H    insulin glargine  25 Units Subcutaneous Daily    insulin lispro  0-18 Units Subcutaneous Q4H    enoxaparin  40 mg Subcutaneous Daily    metoprolol  5 mg Intravenous Q6H    fluticasone  2 spray Nasal Daily    sodium chloride flush  10 mL Intravenous 2 times per day    pantoprazole  40 mg Intravenous Daily     PRN Meds: naloxone, glucose, dextrose, glucagon (rDNA), dextrose, acetaminophen **OR** acetaminophen, sodium chloride flush, [DISCONTINUED] promethazine **OR** ondansetron    Labs:     Recent Labs     09/19/20  0200 09/20/20  0440 09/20/20  1005 09/21/20  0600   WBC 26.3* 32.8*  --  29.4*   HGB 7.7* 6.3* 7.0* 7.9*   HCT 25.0* 20.3* 21.5* 24.8*   * 469*  --  526*       Recent Labs     09/19/20  1740 09/20/20  0440 09/21/20  0600    135 137   K 4.9 4.5 3.7   CL 99 101 99   CO2 23 21* 24   BUN 22* 20 12   CREATININE 1.5* 1.2 0.7   CALCIUM 9.0 8.6 8.7   PHOS 4.5 3.7 3.2       Recent Labs     09/19/20  1740 09/20/20  0440 09/21/20  0600   PROT 5.9* 5.4* 5.6*   ALKPHOS 134* 127 144*   ALT 44* 45* 50*   AST 59* 60* 42*   BILITOT 0.2 <0.2 0.3       No results for input(s): INR in the last 72 hours. No results for input(s): Jace Crystal in the last 72 hours.     Chronic labs:    Lab Results   Component Value Date    CHOL 94 09/18/2020    TRIG 151 (H) 09/18/2020

## 2020-09-21 NOTE — CARE COORDINATION
SW confirmed Transition Plan of Care with Pt to return to [address] when medically stable. Pt is a bed hold and needs pre-cert to return. Covid negative test also needed. Previous worker completed envelope and ambulance form. SW requested a wet covid test for Pt. Discharge Plan is to return to [address] when medically stable. ALFREDO/CRISTOPHER to follow for discharge needs.    Catherine Steele, L.S.W.  [phone]

## 2020-09-21 NOTE — PROGRESS NOTES
Patient transported to [phone]. Vitals stable. Patient belongings including clothing, shoes, and two phones and two phone chargers, transported with patient on bed.

## 2020-09-21 NOTE — PROGRESS NOTES
[facility]  SURGICAL INTENSIVE CARE UNIT (SICU)  ATTENDING PHYSICIAN CRITICAL CARE PROGRESS NOTE     I have examined the patient, reviewed the record,and discussed the case with the APN/  Resident. I have reviewed all relevant labs and imaging data. The following summarizes my clinical findings and independent assessment. Date of admission:  9/17/2020    CC: Belinda     S. Was transferred out of SICU yesterday  Patient notes passing some gas. HOSPITAL COURSE:   9/18 cholecystectomy, splenectomy, SBR and Right hemicolectomy     Physical Exam  HENT:      Head: Normocephalic. Eyes:      Extraocular Movements: Extraocular movements intact. Pupils: Pupils are equal, round, and reactive to light. Neck:      Musculoskeletal: Normal range of motion. Cardiovascular:      Rate and Rhythm: Tachycardia present. Pulmonary:      Effort: Pulmonary effort is normal. No respiratory distress. Abdominal:      General: There is no distension ( mild). Tenderness: There is abdominal tenderness ( appropriate). There is no guarding or rebound. Comments: [place] present c/d/i   Musculoskeletal: Normal range of motion. Skin:     General: Skin is warm. Neurological:      Mental Status: He is alert. Psychiatric:         Mood and Affect: Mood normal.         Assessment   Active Problems:    SIMÓN (acute kidney injury) ([address]    Septicemia ([address]    Intra-abdominal infection  Resolved Problems:    * No resolved hospital problems. *      Plan   GI: post op. Dc ng. Await bowel function  Neuro:  Cymbalta on hold , Robaxin,  dilaudid PCA , robaxin   Renal: LR at 75 cc/hr. Monitor Uop.  Dc hernandez  Musculoskeletal: WBAT all extremities ,  AM-PAC Score pending   Pulmonary: Aggressive pulmonary hygiene , SMI , Monitor RR and Maintain SpO2 > 92%   Hx of PE--IVC filter present  ID:  No indication for Abx ,   Monitor leukocytosis and Monitor Fever Curve Post splen ectomy vaccinations prior to DC   Heme: Transfusion secondary to hb <7    Cardiac: Monitor Hemodynamics Home metoprolol ( IV)  , statin ( once enteral intact)   Endocrine: Hold  metformin,  Lantus 25U Angelika dose SSI - Will likely need to increase once eating       DVT Prophylaxis: PCDs, SQ Lovenox   Ulcer Prophylaxis: Home PPI   Tubes and Lines:    Seizure proph:     No Indication  Ancillary consults:   PT/OT    Family Update:         As available   CODE Status:       Full Code     Lambert De La Cruz MD, FACS  9/21/2020  11:17 AM

## 2020-09-22 LAB
ALBUMIN SERPL-MCNC: 2.2 G/DL (ref 3.5–5.2)
ALP BLD-CCNC: 165 U/L (ref 40–129)
ALT SERPL-CCNC: 35 U/L (ref 0–40)
ANION GAP SERPL CALCULATED.3IONS-SCNC: 12 MMOL/L (ref 7–16)
AST SERPL-CCNC: 19 U/L (ref 0–39)
BILIRUB SERPL-MCNC: 0.3 MG/DL (ref 0–1.2)
BILIRUBIN DIRECT: <0.2 MG/DL (ref 0–0.3)
BILIRUBIN, INDIRECT: ABNORMAL MG/DL (ref 0–1)
BLOOD CULTURE, ROUTINE: NORMAL
BUN BLDV-MCNC: 7 MG/DL (ref 6–20)
CALCIUM SERPL-MCNC: 8.6 MG/DL (ref 8.6–10.2)
CHLORIDE BLD-SCNC: 98 MMOL/L (ref 98–107)
CO2: 25 MMOL/L (ref 22–29)
CREAT SERPL-MCNC: 0.6 MG/DL (ref 0.7–1.2)
GFR AFRICAN AMERICAN: >60
GFR NON-AFRICAN AMERICAN: >60 ML/MIN/1.73
GLUCOSE BLD-MCNC: 66 MG/DL (ref 74–99)
HCT VFR BLD CALC: 23.9 % (ref 37–54)
HEMOGLOBIN: 7.7 G/DL (ref 12.5–16.5)
MCH RBC QN AUTO: 28 PG (ref 26–35)
MCHC RBC AUTO-ENTMCNC: 32.2 % (ref 32–34.5)
MCV RBC AUTO: 86.9 FL (ref 80–99.9)
METER GLUCOSE: 100 MG/DL (ref 74–99)
METER GLUCOSE: 108 MG/DL (ref 74–99)
METER GLUCOSE: 70 MG/DL (ref 74–99)
METER GLUCOSE: 76 MG/DL (ref 74–99)
METER GLUCOSE: 85 MG/DL (ref 74–99)
PDW BLD-RTO: 17.6 FL (ref 11.5–15)
PLATELET # BLD: 542 E9/L (ref 130–450)
PMV BLD AUTO: 8.6 FL (ref 7–12)
POTASSIUM SERPL-SCNC: 3.4 MMOL/L (ref 3.5–5)
RBC # BLD: 2.75 E12/L (ref 3.8–5.8)
SODIUM BLD-SCNC: 135 MMOL/L (ref 132–146)
TOTAL PROTEIN: 5.2 G/DL (ref 6.4–8.3)
WBC # BLD: 27.9 E9/L (ref 4.5–11.5)

## 2020-09-22 PROCEDURE — 2580000003 HC RX 258: Performed by: STUDENT IN AN ORGANIZED HEALTH CARE EDUCATION/TRAINING PROGRAM

## 2020-09-22 PROCEDURE — 85027 COMPLETE CBC AUTOMATED: CPT

## 2020-09-22 PROCEDURE — 6370000000 HC RX 637 (ALT 250 FOR IP): Performed by: INTERNAL MEDICINE

## 2020-09-22 PROCEDURE — 6360000002 HC RX W HCPCS: Performed by: STUDENT IN AN ORGANIZED HEALTH CARE EDUCATION/TRAINING PROGRAM

## 2020-09-22 PROCEDURE — 94770 HC ETCO2 MONITOR DAILY: CPT

## 2020-09-22 PROCEDURE — 2500000003 HC RX 250 WO HCPCS: Performed by: STUDENT IN AN ORGANIZED HEALTH CARE EDUCATION/TRAINING PROGRAM

## 2020-09-22 PROCEDURE — 97530 THERAPEUTIC ACTIVITIES: CPT

## 2020-09-22 PROCEDURE — 2700000000 HC OXYGEN THERAPY PER DAY

## 2020-09-22 PROCEDURE — 6370000000 HC RX 637 (ALT 250 FOR IP): Performed by: STUDENT IN AN ORGANIZED HEALTH CARE EDUCATION/TRAINING PROGRAM

## 2020-09-22 PROCEDURE — 82962 GLUCOSE BLOOD TEST: CPT

## 2020-09-22 PROCEDURE — 80048 BASIC METABOLIC PNL TOTAL CA: CPT

## 2020-09-22 PROCEDURE — 2060000000 HC ICU INTERMEDIATE R&B

## 2020-09-22 PROCEDURE — 80076 HEPATIC FUNCTION PANEL: CPT

## 2020-09-22 PROCEDURE — 36415 COLL VENOUS BLD VENIPUNCTURE: CPT

## 2020-09-22 RX ORDER — OXYCODONE HYDROCHLORIDE 5 MG/1
5 TABLET ORAL EVERY 4 HOURS PRN
Status: DISCONTINUED | OUTPATIENT
Start: 2020-09-22 | End: 2020-09-26 | Stop reason: HOSPADM

## 2020-09-22 RX ORDER — METHOCARBAMOL 500 MG/1
500 TABLET, FILM COATED ORAL 4 TIMES DAILY
Status: DISCONTINUED | OUTPATIENT
Start: 2020-09-22 | End: 2020-09-26 | Stop reason: HOSPADM

## 2020-09-22 RX ORDER — POTASSIUM CHLORIDE 20 MEQ/1
40 TABLET, EXTENDED RELEASE ORAL ONCE
Status: COMPLETED | OUTPATIENT
Start: 2020-09-22 | End: 2020-09-22

## 2020-09-22 RX ORDER — OXYCODONE HYDROCHLORIDE 10 MG/1
10 TABLET ORAL EVERY 4 HOURS PRN
Status: DISCONTINUED | OUTPATIENT
Start: 2020-09-22 | End: 2020-09-26 | Stop reason: HOSPADM

## 2020-09-22 RX ORDER — PANTOPRAZOLE SODIUM 40 MG/1
40 TABLET, DELAYED RELEASE ORAL
Status: DISCONTINUED | OUTPATIENT
Start: 2020-09-22 | End: 2020-09-26 | Stop reason: HOSPADM

## 2020-09-22 RX ORDER — ACETAMINOPHEN 325 MG/1
650 TABLET ORAL EVERY 6 HOURS
Status: DISCONTINUED | OUTPATIENT
Start: 2020-09-22 | End: 2020-09-26 | Stop reason: HOSPADM

## 2020-09-22 RX ORDER — DOCUSATE SODIUM 100 MG/1
100 CAPSULE, LIQUID FILLED ORAL 2 TIMES DAILY
Status: DISCONTINUED | OUTPATIENT
Start: 2020-09-22 | End: 2020-09-23

## 2020-09-22 RX ADMIN — PANTOPRAZOLE SODIUM 40 MG: 40 TABLET, DELAYED RELEASE ORAL at 06:14

## 2020-09-22 RX ADMIN — METOPROLOL TARTRATE 5 MG: 5 INJECTION INTRAVENOUS at 14:33

## 2020-09-22 RX ADMIN — FLUTICASONE PROPIONATE 2 SPRAY: 50 SPRAY, METERED NASAL at 07:59

## 2020-09-22 RX ADMIN — ACETAMINOPHEN 650 MG: 325 TABLET, FILM COATED ORAL at 13:13

## 2020-09-22 RX ADMIN — HYDROMORPHONE HYDROCHLORIDE 0.5 MG: 1 INJECTION, SOLUTION INTRAMUSCULAR; INTRAVENOUS; SUBCUTANEOUS at 15:54

## 2020-09-22 RX ADMIN — DOCUSATE SODIUM 100 MG: 100 CAPSULE, LIQUID FILLED ORAL at 07:58

## 2020-09-22 RX ADMIN — METHOCARBAMOL 500 MG: 100 INJECTION INTRAMUSCULAR; INTRAVENOUS at 03:47

## 2020-09-22 RX ADMIN — HYDROMORPHONE HYDROCHLORIDE 0.5 MG: 1 INJECTION, SOLUTION INTRAMUSCULAR; INTRAVENOUS; SUBCUTANEOUS at 19:58

## 2020-09-22 RX ADMIN — METOPROLOL TARTRATE 5 MG: 5 INJECTION INTRAVENOUS at 03:47

## 2020-09-22 RX ADMIN — METHOCARBAMOL TABLETS 500 MG: 500 TABLET, COATED ORAL at 17:47

## 2020-09-22 RX ADMIN — ACETAMINOPHEN 650 MG: 325 TABLET, FILM COATED ORAL at 06:15

## 2020-09-22 RX ADMIN — OXYCODONE HYDROCHLORIDE 10 MG: 10 TABLET ORAL at 12:06

## 2020-09-22 RX ADMIN — METOPROLOL TARTRATE 5 MG: 5 INJECTION INTRAVENOUS at 19:58

## 2020-09-22 RX ADMIN — OXYCODONE HYDROCHLORIDE 10 MG: 10 TABLET ORAL at 07:58

## 2020-09-22 RX ADMIN — ENOXAPARIN SODIUM 40 MG: 40 INJECTION SUBCUTANEOUS at 07:59

## 2020-09-22 RX ADMIN — SODIUM CHLORIDE, PRESERVATIVE FREE 10 ML: 5 INJECTION INTRAVENOUS at 15:54

## 2020-09-22 RX ADMIN — METHOCARBAMOL TABLETS 500 MG: 500 TABLET, COATED ORAL at 13:13

## 2020-09-22 RX ADMIN — OXYCODONE HYDROCHLORIDE 10 MG: 10 TABLET ORAL at 17:47

## 2020-09-22 RX ADMIN — POTASSIUM CHLORIDE 40 MEQ: 1500 TABLET, EXTENDED RELEASE ORAL at 09:53

## 2020-09-22 RX ADMIN — DOCUSATE SODIUM 100 MG: 100 CAPSULE, LIQUID FILLED ORAL at 19:58

## 2020-09-22 RX ADMIN — METOPROLOL TARTRATE 5 MG: 5 INJECTION INTRAVENOUS at 07:58

## 2020-09-22 RX ADMIN — METHOCARBAMOL TABLETS 500 MG: 500 TABLET, COATED ORAL at 19:58

## 2020-09-22 RX ADMIN — METHOCARBAMOL TABLETS 500 MG: 500 TABLET, COATED ORAL at 07:59

## 2020-09-22 RX ADMIN — HYDROMORPHONE HYDROCHLORIDE 0.5 MG: 1 INJECTION, SOLUTION INTRAMUSCULAR; INTRAVENOUS; SUBCUTANEOUS at 09:53

## 2020-09-22 RX ADMIN — Medication: at 00:56

## 2020-09-22 RX ADMIN — Medication 10 ML: at 07:59

## 2020-09-22 RX ADMIN — Medication 10 ML: at 19:58

## 2020-09-22 RX ADMIN — ACETAMINOPHEN 650 MG: 325 TABLET, FILM COATED ORAL at 17:47

## 2020-09-22 ASSESSMENT — PAIN SCALES - GENERAL
PAINLEVEL_OUTOF10: 9
PAINLEVEL_OUTOF10: 8
PAINLEVEL_OUTOF10: 6
PAINLEVEL_OUTOF10: 10
PAINLEVEL_OUTOF10: 8
PAINLEVEL_OUTOF10: 10
PAINLEVEL_OUTOF10: 10
PAINLEVEL_OUTOF10: 5
PAINLEVEL_OUTOF10: 7
PAINLEVEL_OUTOF10: 6
PAINLEVEL_OUTOF10: 9
PAINLEVEL_OUTOF10: 7

## 2020-09-22 ASSESSMENT — PAIN DESCRIPTION - LOCATION
LOCATION: ABDOMEN;BACK
LOCATION: ABDOMEN;BACK
LOCATION: ABDOMEN
LOCATION: ABDOMEN;BACK

## 2020-09-22 ASSESSMENT — PAIN DESCRIPTION - PROGRESSION
CLINICAL_PROGRESSION: NOT CHANGED

## 2020-09-22 ASSESSMENT — PAIN DESCRIPTION - ONSET
ONSET: ON-GOING

## 2020-09-22 ASSESSMENT — PAIN DESCRIPTION - DESCRIPTORS
DESCRIPTORS: ACHING;DISCOMFORT

## 2020-09-22 ASSESSMENT — PAIN DESCRIPTION - PAIN TYPE
TYPE: ACUTE PAIN;SURGICAL PAIN

## 2020-09-22 ASSESSMENT — PAIN DESCRIPTION - FREQUENCY
FREQUENCY: CONTINUOUS

## 2020-09-22 NOTE — CARE COORDINATION
SW confirmed with Pt is to return to [address] when medically stable. Ruthann Smallwood is starting pre-cert today. Wet covid test sent 09/21/20. ALFREDO/CRISTOPHER to follow for discharge needs.    Sharon Wang, L.S.W.  [phone]

## 2020-09-22 NOTE — PROGRESS NOTES
Hospitalist Progress Note      PCP: Kalpana Savage, IGOR - CNP    Date of Admission: 9/17/2020  Days in the hospital: [address] Course:   Patient admitted on 9/17/2020 presented with abdominal pain, bleeding from rectum, and dizziness. He does admit to some shortness of breath that also started day of presentation that is associated with exertion. He denies any chest pain. [facility] was recently admitted previously for anemia from GI bleed due to recently diagnosed colon mass. He has a history of bilateral pulmonary embolism and IVC filter was recently placed in the setting of blood clots with recurrent bleeding. He also has JULIO drain from being previously admitted to MICU with sepsis from gallbladder or splenic abscess.  Patient admitted to SICU for sepsis and abdominal infection. 9/18- Patient underwent ex lap with cholecystectomy, right hemicolectomy with small bowel resection and side to side ileocolonic stabled anastomosis, splenectomy, omentectomy. Subjective  Patient seen and examined at bedside. Denies any headache or dizziness. He had BM yesterday. Chart reviewed, overnight events reviewed. Being followed by surgery and wound care. Exam:    BP (!) 107/57   Pulse 115   Temp 97 °F (36.1 °C) (Temporal)   Resp 20   Ht 6' (1.829 m)   Wt 273 lb 4.8 oz (124 kg)   SpO2 93%   BMI 37.07 kg/m²     HEENT: + Pallor, no icterus. Respiratory:  CTA, good air entry. Cardiovascular: RRR, no murmur. Abdomen: Soft, non-tender, abdominal staples noted  Musculoskeletal: No joint pains or joint swelling noted.    Neurologic: awake, alert and following commands       Assessment/Plan:  · Sepsis  · Abdominal infection with possible fistula  · Acute acalculous cholecystitis  · Anemia  · History of bilateral PE status post IVC filter placement  · Hypertension  · Diabetes mellitus type 2  · SIMÓN  · Hypomagnesemia  · Status post splenectomy    Plan:  Status post surgery, patient underwent exploratory laparotomy with

## 2020-09-22 NOTE — PROGRESS NOTES
Physical Therapy  Physical Therapy   Treatment Note     Name: Mago Quevedo  : 1975  MRN: [id number]    Referring Provider:  Milagrso Alberts DO     Date of Service: 2020    Evaluating PT:  Bianka Jasmine PT, DPT. [id number]    Room #:  5889/3934-W  Diagnosis:  SIMÓN   Reason for admission:  Dizziness, rectal bleed  Precautions:  Falls, PCA pump, JULIO drain, abdominal precautions, NGT, R wrist drop  Pertinent PMHx: OA, HTN, HLD, DM, fibromyalgia, depression, back pain, SIMÓN  Procedures:  ex lap, cholecystectomy, bowel resection, R hemicolectomy  Equipment Recommendations:  TBD    SUBJECTIVE:  Pt admitted from [address] [place] [address]. States he is ambulatory using a cane or wheelchair user for longer distances. OBJECTIVE:   Initial Evaluation  Date:  Treatment  Date:  Short Term/ Long Term   Goals   AM-PAC 6 Clicks 94/02 89/65    Was pt agreeable to Eval/treatment? Yes  yES    Does pt have pain?  \"30/10\" abdomen  Abdominal pain with movement    Bed Mobility  Rolling: SBA  Supine to sit: Corrie  Sit to supine: Corrie  Scooting: SBA Rolling: Supervision  Supine<>sit: Supervision  Scooting: Supervision Independent    Transfers Sit to stand: NT  Stand to sit: NT  Stand pivot: NT Sit<>stand: SBA  Stand pivot: Min A Independent    Ambulation    NT, pt refused d/t pain    20 feet with no AD with Min A >200 feet with AAD Mod I   Stair negotiation: ascended and descended  NT NT TBD   ROM BUE:  See OT eval   BLE:  WFL BLE: WFL    Strength BUE:  See OT eval   BLE:  knee ext 5/5  Ankle DF 5/5 BLE: grossly 5/5 Increase by 1/3 MMT grade    Balance Sitting EOB:  SBA  Dynamic Standing:  NT Dynamic standing: CGA/Min A Sitting EOB:  indep  Dynamic Standing:  indep       Pt is A & O x 4  Sensation:  Pt denies numbness and tingling to extremities  Edema:  None noted    Therapeutic Exercises:    Sit<>stand x2 with SBA  Ambulation: 2x20 feet with no AD with CGA/Min A  Stand pivot transfer x2 with no AD with Min A    Patient education  Pt educated on abdominal precautions, bed mobility technique, need for AD to reduce strain on abdomen. Patient response to education:   Pt verbalized understanding Pt demonstrated skill Pt requires further education in this area   Yes With cues Yes     ASSESSMENT:    Comments: The pt was initially unwilling to ambulate stating he was about to eat but was agreeable upon PT returning. The pt was instructed on movement techniques to reduce abdominal strain. The pt required no physical assistance to complete bed mobility and was able to ambulate to the commode without physical assistance, however he used walls and furniture to ambulate and had difficulty sitting on commode with any kind of control. The pt was assisted with ambulation then to hallway but was unable to ambulate >20 feet at a time due to abdominal pain. The pt was able to demonstrate RUE grasp on grab bar and bed rail, advised the pt to use a WW to reduce abdominal strain and reduce risk for falls. The pt was assisted back to the bedside and positioned skillfully in bed. The pt was left resting comfortably with call light in reach and all needs met. Discussed the pt's performance and need for a [employer] with nursing and a walker was ordered for the pt. Treatment:  Patient practiced and was instructed in the following treatment:     Bed mobility training - pt given verbal and tactile cues to facilitate proper sequencing and safety during rolling and supine>sit as well as provided with physical assistance to complete task   Transfer training: verbal cues for hand placement sit to stand transfer and assistance for anterior weight shift in order to facilitate initiation of the stand.  Gait training: verbal cues for safety, educated on need for AD for abdominal incision     PLAN:    Patient is making good progress towards established goals. Will continue with current POC.       Time in  1510  Time out  1535    Total Treatment Time  25 minutes

## 2020-09-22 NOTE — PROGRESS NOTES
[facility]  SURGICAL INTENSIVE CARE UNIT (SICU)  ATTENDING PHYSICIAN CRITICAL CARE PROGRESS NOTE     I have examined the patient, reviewed the record,and discussed the case with the APN/  Resident. I have reviewed all relevant labs and imaging data. The following summarizes my clinical findings and independent assessment. Date of admission:  9/17/2020    CC: Sepsis     S. Hernandez removed. Pt is voiding. + flatus. + BM    HOSPITAL COURSE:   9/18 cholecystectomy, splenectomy, SBR and Right hemicolectomy     Physical Exam  HENT:      Head: Normocephalic. Eyes:      Extraocular Movements: Extraocular movements intact. Pupils: Pupils are equal, round, and reactive to light. Neck:      Musculoskeletal: Normal range of motion. Cardiovascular:      Rate and Rhythm: Tachycardia present. Pulmonary:      Effort: Pulmonary effort is normal. No respiratory distress. Abdominal:      General: There is no distension ( mild). Tenderness: There is abdominal tenderness ( appropriate). There is no guarding or rebound. Comments: [place] present c/d/i   Musculoskeletal: Normal range of motion. Skin:     General: Skin is warm. Neurological:      Mental Status: He is alert. Psychiatric:         Mood and Affect: Mood normal.         Assessment   Active Problems:    SIMÓN (acute kidney injury) ([address]    Septicemia ([address]    Intra-abdominal infection  Resolved Problems:    * No resolved hospital problems. *      Plan   GI: post op. Clear liquids  Neuro:  Cymbalta on hold , Robaxin,  Stop PCA  Renal: LR at 75 cc/hr. Monitor Uop.  Dc hernandez  Musculoskeletal: WBAT all extremities ,  AM-PAC Score pending   Pulmonary: Aggressive pulmonary hygiene , SMI , Monitor RR and Maintain SpO2 > 92%   Hx of PE--IVC filter present  ID:  No indication for Abx ,   Monitor leukocytosis and Monitor Fever Curve Post splen ectomy vaccinations prior to DC   Heme: Transfusion secondary to hb <7    Cardiac: Monitor

## 2020-09-23 LAB
ABO/RH: NORMAL
ANION GAP SERPL CALCULATED.3IONS-SCNC: 13 MMOL/L (ref 7–16)
ANTIBODY SCREEN: NORMAL
BLOOD BANK DISPENSE STATUS: NORMAL
BLOOD BANK PRODUCT CODE: NORMAL
BPU ID: NORMAL
BUN BLDV-MCNC: 8 MG/DL (ref 6–20)
CALCIUM SERPL-MCNC: 8.3 MG/DL (ref 8.6–10.2)
CHLORIDE BLD-SCNC: 101 MMOL/L (ref 98–107)
CO2: 24 MMOL/L (ref 22–29)
CREAT SERPL-MCNC: 0.6 MG/DL (ref 0.7–1.2)
DESCRIPTION BLOOD BANK: NORMAL
GFR AFRICAN AMERICAN: >60
GFR NON-AFRICAN AMERICAN: >60 ML/MIN/1.73
GLUCOSE BLD-MCNC: 104 MG/DL (ref 74–99)
HCT VFR BLD CALC: 23.4 % (ref 37–54)
HEMOGLOBIN: 7.5 G/DL (ref 12.5–16.5)
MCH RBC QN AUTO: 28.1 PG (ref 26–35)
MCHC RBC AUTO-ENTMCNC: 32.1 % (ref 32–34.5)
MCV RBC AUTO: 87.6 FL (ref 80–99.9)
METER GLUCOSE: 115 MG/DL (ref 74–99)
METER GLUCOSE: 117 MG/DL (ref 74–99)
METER GLUCOSE: 122 MG/DL (ref 74–99)
METER GLUCOSE: 128 MG/DL (ref 74–99)
METER GLUCOSE: 163 MG/DL (ref 74–99)
METER GLUCOSE: 167 MG/DL (ref 74–99)
PDW BLD-RTO: 17.7 FL (ref 11.5–15)
PLATELET # BLD: 677 E9/L (ref 130–450)
PMV BLD AUTO: 8.7 FL (ref 7–12)
POTASSIUM SERPL-SCNC: 3.6 MMOL/L (ref 3.5–5)
RBC # BLD: 2.67 E12/L (ref 3.8–5.8)
SARS-COV-2: NOT DETECTED
SODIUM BLD-SCNC: 138 MMOL/L (ref 132–146)
SOURCE: NORMAL
WBC # BLD: 26.8 E9/L (ref 4.5–11.5)

## 2020-09-23 PROCEDURE — 85027 COMPLETE CBC AUTOMATED: CPT

## 2020-09-23 PROCEDURE — 2580000003 HC RX 258: Performed by: INTERNAL MEDICINE

## 2020-09-23 PROCEDURE — 90471 IMMUNIZATION ADMIN: CPT | Performed by: SPECIALIST

## 2020-09-23 PROCEDURE — G0009 ADMIN PNEUMOCOCCAL VACCINE: HCPCS | Performed by: SPECIALIST

## 2020-09-23 PROCEDURE — 6360000002 HC RX W HCPCS: Performed by: SPECIALIST

## 2020-09-23 PROCEDURE — 97530 THERAPEUTIC ACTIVITIES: CPT

## 2020-09-23 PROCEDURE — 2580000003 HC RX 258: Performed by: STUDENT IN AN ORGANIZED HEALTH CARE EDUCATION/TRAINING PROGRAM

## 2020-09-23 PROCEDURE — 80048 BASIC METABOLIC PNL TOTAL CA: CPT

## 2020-09-23 PROCEDURE — 86850 RBC ANTIBODY SCREEN: CPT

## 2020-09-23 PROCEDURE — 6360000002 HC RX W HCPCS: Performed by: STUDENT IN AN ORGANIZED HEALTH CARE EDUCATION/TRAINING PROGRAM

## 2020-09-23 PROCEDURE — 87449 NOS EACH ORGANISM AG IA: CPT

## 2020-09-23 PROCEDURE — 90620 MENB-4C VACCINE IM: CPT | Performed by: SPECIALIST

## 2020-09-23 PROCEDURE — P9016 RBC LEUKOCYTES REDUCED: HCPCS

## 2020-09-23 PROCEDURE — 86901 BLOOD TYPING SEROLOGIC RH(D): CPT

## 2020-09-23 PROCEDURE — 2700000000 HC OXYGEN THERAPY PER DAY

## 2020-09-23 PROCEDURE — 6370000000 HC RX 637 (ALT 250 FOR IP): Performed by: SURGERY

## 2020-09-23 PROCEDURE — 90670 PCV13 VACCINE IM: CPT | Performed by: SPECIALIST

## 2020-09-23 PROCEDURE — 86923 COMPATIBILITY TEST ELECTRIC: CPT

## 2020-09-23 PROCEDURE — 6370000000 HC RX 637 (ALT 250 FOR IP): Performed by: STUDENT IN AN ORGANIZED HEALTH CARE EDUCATION/TRAINING PROGRAM

## 2020-09-23 PROCEDURE — 87324 CLOSTRIDIUM AG IA: CPT

## 2020-09-23 PROCEDURE — 97166 OT EVAL MOD COMPLEX 45 MIN: CPT

## 2020-09-23 PROCEDURE — 2500000003 HC RX 250 WO HCPCS: Performed by: STUDENT IN AN ORGANIZED HEALTH CARE EDUCATION/TRAINING PROGRAM

## 2020-09-23 PROCEDURE — 86900 BLOOD TYPING SEROLOGIC ABO: CPT

## 2020-09-23 PROCEDURE — 36415 COLL VENOUS BLD VENIPUNCTURE: CPT

## 2020-09-23 PROCEDURE — 82962 GLUCOSE BLOOD TEST: CPT

## 2020-09-23 PROCEDURE — 2060000000 HC ICU INTERMEDIATE R&B

## 2020-09-23 PROCEDURE — 36430 TRANSFUSION BLD/BLD COMPNT: CPT

## 2020-09-23 PROCEDURE — 97535 SELF CARE MNGMENT TRAINING: CPT

## 2020-09-23 RX ORDER — SODIUM CHLORIDE 9 MG/ML
250 INJECTION, SOLUTION INTRAVENOUS ONCE
Status: COMPLETED | OUTPATIENT
Start: 2020-09-23 | End: 2020-09-23

## 2020-09-23 RX ORDER — OXYCODONE HYDROCHLORIDE 5 MG/1
5 TABLET ORAL EVERY 6 HOURS PRN
Qty: 28 TABLET | Refills: 0 | Status: SHIPPED | OUTPATIENT
Start: 2020-09-23 | End: 2020-09-30

## 2020-09-23 RX ADMIN — ACETAMINOPHEN 650 MG: 325 TABLET, FILM COATED ORAL at 00:30

## 2020-09-23 RX ADMIN — OXYCODONE HYDROCHLORIDE 5 MG: 5 TABLET ORAL at 10:29

## 2020-09-23 RX ADMIN — OXYCODONE HYDROCHLORIDE 10 MG: 10 TABLET ORAL at 18:48

## 2020-09-23 RX ADMIN — Medication 10 ML: at 20:31

## 2020-09-23 RX ADMIN — HYDROMORPHONE HYDROCHLORIDE 0.5 MG: 1 INJECTION, SOLUTION INTRAMUSCULAR; INTRAVENOUS; SUBCUTANEOUS at 03:20

## 2020-09-23 RX ADMIN — INSULIN GLARGINE 25 UNITS: 100 INJECTION, SOLUTION SUBCUTANEOUS at 05:31

## 2020-09-23 RX ADMIN — METHOCARBAMOL TABLETS 500 MG: 500 TABLET, COATED ORAL at 13:49

## 2020-09-23 RX ADMIN — ACETAMINOPHEN 650 MG: 325 TABLET, FILM COATED ORAL at 18:48

## 2020-09-23 RX ADMIN — ENOXAPARIN SODIUM 40 MG: 40 INJECTION SUBCUTANEOUS at 09:41

## 2020-09-23 RX ADMIN — ACETAMINOPHEN 650 MG: 325 TABLET, FILM COATED ORAL at 05:31

## 2020-09-23 RX ADMIN — PNEUMOCOCCAL 13-VALENT CONJUGATE VACCINE 0.5 ML: 2.2; 2.2; 2.2; 2.2; 2.2; 4.4; 2.2; 2.2; 2.2; 2.2; 2.2; 2.2; 2.2 INJECTION, SUSPENSION INTRAMUSCULAR at 18:53

## 2020-09-23 RX ADMIN — OXYCODONE HYDROCHLORIDE 10 MG: 10 TABLET ORAL at 14:44

## 2020-09-23 RX ADMIN — Medication 10 ML: at 09:44

## 2020-09-23 RX ADMIN — METOPROLOL TARTRATE 5 MG: 5 INJECTION INTRAVENOUS at 03:20

## 2020-09-23 RX ADMIN — METHOCARBAMOL TABLETS 500 MG: 500 TABLET, COATED ORAL at 20:31

## 2020-09-23 RX ADMIN — INSULIN LISPRO 3 UNITS: 100 INJECTION, SOLUTION INTRAVENOUS; SUBCUTANEOUS at 17:04

## 2020-09-23 RX ADMIN — ACETAMINOPHEN 650 MG: 325 TABLET, FILM COATED ORAL at 11:56

## 2020-09-23 RX ADMIN — SODIUM CHLORIDE 250 ML: 9 INJECTION, SOLUTION INTRAVENOUS at 15:00

## 2020-09-23 RX ADMIN — METHOCARBAMOL TABLETS 500 MG: 500 TABLET, COATED ORAL at 16:50

## 2020-09-23 RX ADMIN — OXYCODONE HYDROCHLORIDE 10 MG: 10 TABLET ORAL at 00:28

## 2020-09-23 RX ADMIN — METOPROLOL TARTRATE 5 MG: 5 INJECTION INTRAVENOUS at 09:40

## 2020-09-23 RX ADMIN — OXYCODONE HYDROCHLORIDE 5 MG: 5 TABLET ORAL at 09:40

## 2020-09-23 RX ADMIN — METOPROLOL TARTRATE 5 MG: 5 INJECTION INTRAVENOUS at 20:31

## 2020-09-23 RX ADMIN — INSULIN LISPRO 3 UNITS: 100 INJECTION, SOLUTION INTRAVENOUS; SUBCUTANEOUS at 20:30

## 2020-09-23 RX ADMIN — OXYCODONE HYDROCHLORIDE 10 MG: 10 TABLET ORAL at 05:31

## 2020-09-23 RX ADMIN — NEISSERIA MENINGITIDIS SEROGROUP B NHBA FUSION PROTEIN ANTIGEN, NEISSERIA MENINGITIDIS SEROGROUP B FHBP FUSION PROTEIN ANTIGEN AND NEISSERIA MENINGITIDIS SEROGROUP B NADA PROTEIN ANTIGEN 0.5 ML: 50; 50; 50; 25 INJECTION, SUSPENSION INTRAMUSCULAR at 18:51

## 2020-09-23 RX ADMIN — SODIUM CHLORIDE, PRESERVATIVE FREE 10 ML: 5 INJECTION INTRAVENOUS at 03:21

## 2020-09-23 RX ADMIN — OXYCODONE HYDROCHLORIDE 10 MG: 10 TABLET ORAL at 23:22

## 2020-09-23 RX ADMIN — METOPROLOL TARTRATE 5 MG: 5 INJECTION INTRAVENOUS at 14:54

## 2020-09-23 RX ADMIN — METHOCARBAMOL TABLETS 500 MG: 500 TABLET, COATED ORAL at 09:40

## 2020-09-23 RX ADMIN — PANTOPRAZOLE SODIUM 40 MG: 40 TABLET, DELAYED RELEASE ORAL at 05:31

## 2020-09-23 ASSESSMENT — PAIN DESCRIPTION - FREQUENCY: FREQUENCY: CONTINUOUS

## 2020-09-23 ASSESSMENT — PAIN SCALES - GENERAL
PAINLEVEL_OUTOF10: 10
PAINLEVEL_OUTOF10: 7
PAINLEVEL_OUTOF10: 8
PAINLEVEL_OUTOF10: 9
PAINLEVEL_OUTOF10: 10
PAINLEVEL_OUTOF10: 9
PAINLEVEL_OUTOF10: 10
PAINLEVEL_OUTOF10: 10
PAINLEVEL_OUTOF10: 7
PAINLEVEL_OUTOF10: 6
PAINLEVEL_OUTOF10: 6
PAINLEVEL_OUTOF10: 9

## 2020-09-23 ASSESSMENT — PAIN DESCRIPTION - PAIN TYPE: TYPE: ACUTE PAIN;SURGICAL PAIN

## 2020-09-23 ASSESSMENT — PAIN DESCRIPTION - DESCRIPTORS: DESCRIPTORS: ACHING;DISCOMFORT;SORE

## 2020-09-23 ASSESSMENT — PAIN DESCRIPTION - ONSET: ONSET: ON-GOING

## 2020-09-23 ASSESSMENT — PAIN DESCRIPTION - LOCATION: LOCATION: ABDOMEN

## 2020-09-23 NOTE — CARE COORDINATION
ALFREDO confirmed with Pt Discharge Plan is to return to [address] [place] [address], pending pre-cert. Pre-cert was started by Jocelyn on 09/22/20. Pt feels that Dr will release him in Friday. ALFREDO/CRISTOPHER to follow for discharge needs.    Ivan Mejia, RAQUEL.S.W.  [phone]

## 2020-09-23 NOTE — PROGRESS NOTES
Occupational Therapy  OCCUPATIONAL THERAPY INITIAL EVALUATION        Date:2020  Patient Name: Darcy Whitaker  MRN: [id number]  : 1975  Room: [facility]    Referring Physician:  Jessy Betancourt MD    Evaluating OT:  LINUS Sagastume, OTR/L #[id number]    AM-PAC Daily Activity Raw Score:  1424  Recommended Adaptive Equipment:  TBD as pt progresses     Reason for Admission:  Pt was transferred from SNF w/ Dizziness, SOB, Abdominal Pain    Diagnosis:     1. Septicemia ([facility] [address]    2. SIMÓN (acute kidney injury) ([facility] [address]    3. Dehydration    4. Anemia, unspecified type    5. Abdominal pain, unspecified abdominal location    6. Leukocytosis, unspecified type    7. Elevated lactic acid level      Procedures this admission:  20:  LAPAROTOMY EXPLORATORY, CHOLECYSTECTOMY, BOWEL RESECTION, right darian colectomy, partial omentectomy, and splenectomy w/ Cecilia Hamm MD     Pertinent Medical History:  Chronic Back Pain, Depression, DM, OA, [place] Francisco Shoulder surgery, R THR, Hernia Repair         Precautions:  Falls  Abdominal Precautions  Right Wrist Drop    Home Living: Pt has been at a SNF for an extended time - plans to return at d/c. Bathroom setup:  Walk-in-Shower, [employer] w/ Grab bars  Equipment owned:  ?? Available Family Assist:   staff assist, therapy    Prior Level of Function:  Required assist w/ ADLs, Transfers and Mobility using [employer] for ambulation.  Has had Right Wrist Drop \"For a long time\" - being addressed in therapy  Driving:  No  Occupation:  None reported    Pain Level:  4/10 at rest prior to ax, increased to \"10+++\"/10 w/ ax;  Nsg present and aware   Additional Complaints:  None    Vitals/Lab Values:   at rest prior to ax increased to 153 w/ standing ax - Physician present and aware    Cognition: A & O x 4   Able to Follow Mulit-Step Commands INDly   Memory:  good    Sequencing:  good    Problem solving:  good    Judgement/safety:  Fair(+)  Additional Comments:  Pt was cooperative w/ Min encouragement for min ax       Functional Assessment:   Initial Eval Status  Date: 9-23-20 Treatment Status  Date: Short Term/Long Term Goals  Treatment frequency: PRN 2-4 x/week  7-10 days   Feeding Set up    Required Assist to open containers, cut food, feeds self w/ L UE d/t Right Wrist Drop  Mod I   Grooming Set up    Able to perform tasks after set up seated EOB using L UE for task - unable to tolerate standing at sink d/t pain, elevated HR    Set up  Seated/Standing At The Sink   UB Dressing Min A/Set up    Required Min A to don gown seated EOB, assist w/ fasteners    Set up   LB Dressing Mod A    Pt declined to attempt use of adaptive techs after pt ed, insisted on completing LB dressing in supine, Mod A to thread LEs into pants, Max A to don socks, Min A for standing balance + Min A to pull pants over hips in standing  Pt has reacher and Sock Aid \"at home\" but not at [employer] A   Bathing NT      Min A   Toileting Mod A    Required Mod A for bowel hygiene/clothing adjustment + Min A for standing balance    Min A   Bed Mobility  Rolling:  SUP  Repositioning:  SUP   Supine to Sit:  SUP    Sit to Supine:  SUP     Pt ed for Log-Rolling, abdominal bracing for pain mgmt/safety     Mod I   Functional Transfers Sit to stand:  Min A  Stand to sit:  Min A      EOB 2x, BSC 1x  Min VCs/Pt ed re: safety/hand placement    SUP   Functional Mobility Min A w/ [employer]    Short distances at b/s, pt ed for safety    Mod I   Balance Sitting:      Static:  SUP EOB    Dynamic:  Close SUP w/ functional ax EOB/BSC    Standing:      Static:  Min A    Dynamic:  Min A w/ functional ax/mobility w/ [employer]       Activity Tolerance Fair  Limited by Pain    Tolerated Sitting:  EOB ~ 10-15 mins w/ functional ax     Tolerated Standing:  ~ 2 mins 2x w/ functional ax w/ seated rest break b/t trials       Visual/  Perceptual WFL  Glasses:  No      Hearing WFL  Hearing Aids  No       Hand dominance: RIGHT    UE ROM: RUE: Proximally WFL - Elbow, Digits WFL - Wrist Drop - Fitted for and Issued Pre-Fabricated Splint for safety/support of extremity. Pt requires further bracing - to be addressed at SNF per pt report    LUE:  WFL    Strength: RUE: grossly 5-/5 Shoulder/Elbow, 1/5 Wrist Extension     LUE: grossly 5-/5 throughout     Strength:   Fair R UE     Good L UE    Fine Motor Coordination:  WFL [place] Francisco UEs    Sensation:  Denies numbness or tingling [place] UEs  Tone:  Low Tone R Wrist, WFL L UE  Edema:  None Noted                            Comments: Upon arrival, patient was found in side-lying. He was agreeable to participate in therapeutic ax. No Family present during session. Received permission from RN prior to engaging pt in OT services. At the end of the session, patient was properly positioned in Semi-Supine. Call light and phone within reach, all lines and tubes intact. Oriented pt to call bell. Made all appropriate Environmental Modifications to facilitate pt's level of IND and safety. All needs met. Bed Alarm activated. RN at b/s         Overall patient demonstrated decreased independence and safety during completion of ADL/functional transfer/mobility tasks. Pt would benefit from continued skilled OT to increase safety and independence with completion of ADL/IADL tasks for functional independence and quality of life. Treatment:      Provided Skilled SUP/Assist w/ Pt safety, Proper Positioning, ADLs, Functional Transfers and Functional Mobility as noted above, as well as set up and clean up for session. Skilled monitoring of Vitals and pts response to treatment.   Consulted RN, PT    Education:      Provided Pt/Family ed re: Purpose of OT services;  OT Plan of Care;       ADL-  Instruction/training on use of DME/AD/Adaptive equip/techs to improve safety/IND with Functional Ax  - use of adaptive equip/techs for LB dressing w/ adherence to Abdominal precautions   Mobility-  Instruction/training on safety and improved independence with bed mobility, functional transfers and functional mobility - use of [employer]   Sitting EOB - to improve dynamic sitting balance and activity tolerance during ADLs as noted above   Activity tolerance - Instruction/training on energy conservation/work simplification for completion of ADLs     Neuromuscular Reeducation to facilitate balance/righting reactions for increased function with ADLs tasks - increased attn to Right Wrist - safety   Cognitive retraining -  Oriented pt to current Date, Place and Situation; Cues for safety during Functional Ax for safety, sequencing, problem solving    Skilled positioning/alignment for Pain Mgmt, Skin Integrity, Edema Control    Skilled monitoring of Vitals during session and pt's response    Techs for improved Safety/Safety Awareness w/ Functional Activity/Mobility    Energy Conservation Techs/Pursed-Lip Breathing (PLB)    Recommendations for Continued Participation in OT services during Hospitalization and at D/C    Neuromuscular Facilitation of UE Functional movement/ROM/Fine Motor dexterity - Wrist ROM - splinting as noted above   Therapeutic Exercises- Instruction on BUE ROM exercises to improve strength and function of BUE for improved indep with ADLs   Visual/Perception - Facilitation of Visual Perceptual Skills for increased safety and independence with ADLs     Pt and/or Family verbalized/demonstrated a Good understanding of education provided. Will Review PRN.        Assessment of current deficits   Functional mobility [x]  ADLs [x] Strength [x]  Cognition []  Functional transfers  [x] IADLs [x] Safety Awareness [x]  Endurance [x]  Fine Motor Coordination [x] Balance [x] Vision/perception [] Sensation []   Gross Motor Coordination [x] ROM [x] Delirium []                  Motor Control [x]      Plan of Care:  OT 2--4 x/week for 7-10 days PRN   [x] ADL retraining/AE, Equipment Needs/Recommendations   [x] Energy Conservation Techniques/Strategies      [x] Neuromuscular Re-Education      [x] Functional Transfer Training         [x] Functional Mobility Training          [] Cognitive Re-Training          [x] Splinting/Positioning Needs           [x] Therapeutic Activity   [x]Therapeutic Exercise   [] Visual/Perceptual   [] Delirium Prevention/Treatment   [x] Positioning to Improve Functional [place], Safety, and Skin Integrity   [x] Patient and/or Family Education to Increase Safety and Functional [place]   [x] Environmental Modifications  [x] Compensatory techniques for ADLs   [x] Other:         Pt would benefit from continued skilled OT services to increase safety and independence with completion of ADL/IADL tasks for functional independence and quality of life. Pt/Family actively participated in the establishment of goals. Rehab Potential:  Good(-) for established goals    Patient / Family Goal:  Return To SNF for cont'd therapy     Patient and/or Family were instructed on Functional Diagnosis, Prognosis/Goals and OT Plan of Care. Demonstrated Good understanding. Evaluation Time includes thorough review of current medical information, gathering information on past medical history/social history and prior level of function, completion of standardized testing/informal observation of tasks, assessment of data and education on plan of care and goals.      Eval Complexity: Mod  Profile and History - Mod  Assessment of Occupational Performance and Identification of Deficits - Mod  Clinical Decision Making - Mod     Time In:  0855              Time Out:  0407  Total Treatment Time:  25 mins      Treatment Charges: Mins Units   OT Eval Low 12213     OT Eval Medium 97166 X 1   OT Eval High 79483     OT Re-Eval [id number]     Therapeutic Ex  92087     Therapeutic Activities 56346 15 1   ADL/Self Care 67750 10 1   Neuro Re-ed 98379     Orthotic manage/training  46820     Non-Billable Time     Total Timed Treatment 25 2       Maritza Gaston, MOT, OTR/L  # [id number]

## 2020-09-23 NOTE — ADT AUTH CERT
Utilization Reviews         Sepsis and Other Febrile Illness, without Focal Infection - Care Day 6 (9/22/2020) by Danie Reyes RN         Review Status  Review Entered    Completed  9/23/2020 16:16        Criteria Review       Care Day: 6 Care Date: 9/22/2020 Level of Care: ICU    Guideline Day 2    Level Of Care    (X) ICU or floor    9/23/2020 4:16 PM EDT by Bryan Galvez      PICU care    Clinical Status    ( ) * Hemodynamic stability    9/23/2020 4:16 PM EDT by Bryan Galvez      -120    ( ) * Hypoxemia absent    9/23/2020 4:16 PM EDT by Bryan Galvez      lowest sat 89%    (X) * Tachypnea absent    9/23/2020 4:16 PM EDT by Bryan Galvez      RR 14-20    Routes    (X) Parenteral or oral medications    9/23/2020 4:16 PM EDT by Bryan Galvez      IV Lopressor 5mg Q6hrs  PRN IV Dilaudid 0.5mg x3    (X) Diet as tolerated    9/23/2020 4:16 PM EDT by Bryan Galvez      low fiber diet    Interventions    (X) WBC    9/23/2020 4:16 PM EDT by Bryan Galvez      27.9    Medications    (X) Possible DVT prophylaxis    9/23/2020 4:16 PM EDT by Bryan Galvez      SQ Lovenox 40mg QD    * Milestone    Additional Notes    9/22-Care Day 6            BP (!) 107/57   Pulse 115   Temp 97 °F (36.1 °C) (Temporal)   Resp 20   Ht 6' (1.829 m)   Wt 273 lb 4.8 oz (124 kg)   SpO2 93%   BMI 37.07 kg/m²            Remains on PICU       Intermed Note:    Subjective    Patient seen and examined at bedside.  Denies any headache or dizziness.  He had BM yesterday. Michela Vasquez reviewed, overnight events reviewed.  Being followed by surgery and wound care.     Plan:    Status post surgery, patient underwent exploratory laparotomy with cholecystectomy, right hemicolectomy and small bowel resection and side-to-side ileal colonic anastomosis, splenectomy and omentectomy, currently off antibiotics, follow-up with surgery, advance diet         Status post cholecystectomy, follow-up with surgery         Continue to monitor H&H and transfuse as needed         Blood pressure control         Monitor blood sugars closely, continue current dose of insulin         Replace potassium         DC home when okay with surgery       +++++++++++++++++++++    Gen Surg Note:    CC: Sepsis         S.  Hernandez removed. Pt is voiding. + flatus. + BM         HOSPITAL COURSE:    9/18 cholecystectomy, splenectomy, SBR and Right hemicolectomy      Cardiovascular:       Rate and Rhythm: Tachycardia present. Pulmonary:       Effort: Pulmonary effort is normal. No respiratory distress. Abdominal:       General: There is no distension ( mild).       Tenderness: There is abdominal tenderness ( appropriate). There is no guarding or rebound.       Comments: [place] present c/d/i    Plan    GI: post op. Clear liquids    Neuro:  Cymbalta on hold , Robaxin,  Stop PCA    Renal: LR at 75 cc/hr. Monitor Uop.  Dc hernandez    Musculoskeletal: WBAT all extremities ,  AM-PAC Score pending    Pulmonary: Aggressive pulmonary hygiene , SMI , Monitor RR and Maintain SpO2 > 92%    Hx of PE--IVC filter present    ID:  No indication for Abx ,   Monitor leukocytosis and Monitor Fever Curve Post splen ectomy vaccinations prior to DC    Heme: Transfusion secondary to hb <7      Cardiac: Monitor Hemodynamics Home metoprolol ( IV)  , statin ( once enteral intact)    Endocrine: Hold  metformin,  Lantus 25U Angelika dose SSI - Will likely need to increase once eating     DVT Prophylaxis: PCDs, SQ Lovenox    Ulcer Prophylaxis: Home PPI    Tubes and Lines:      Seizure proph:     No Indication    Ancillary consults:   PT/OT      Family Update:         As available    CODE Status:       Full Code       +++++++++++++++++++++    DC planning:    SW confirmed with Pt is to return to [address] when medically stable. Isaac Odor is starting pre-cert today        Sepsis and Other Febrile Illness, without Focal Infection - Care Day 5 (9/21/2020) by Nancy Grimaldo RN         Review Status  Review Entered    Completed  9/23/2020 16:07        Criteria Review       Care Day: 5 Care Date: 9/21/2020 Level of Care: ICU    Guideline Day 2    Level Of Care    ( ) ICU or floor    9/23/2020 4:07 PM EDT by Ozzy Jeronimo Transferred to [facility] care    Clinical Status    ( ) * Hemodynamic stability    9/23/2020 4:07 PM EDT by Mami Lim      HR     ( ) * Hypoxemia absent    9/23/2020 4:07 PM EDT by Mmai Lim      lowest sat 84%    (X) * Tachypnea absent    9/23/2020 4:07 PM EDT by Mami Lim      RR 16-21    Routes    (X) Possible IV fluids    9/23/2020 4:07 PM EDT by Mami Lim      IVF LR 75/hr    (X) Parenteral or oral medications    9/23/2020 4:07 PM EDT by Mami Lim      IV Robaxin 500mg Q6hrs  IV Lopressor 5mg Q6hrs  IV Protonix 40mg QD  ID Dilaudid PCA 0.2mg/ml    (X) Diet as tolerated    9/23/2020 4:07 PM EDT by Mami Lim      Clear liquid diet    Interventions    (X) WBC    9/23/2020 4:07 PM EDT by Mami Lim      29.4    Medications    (X) Possible DVT prophylaxis    9/23/2020 4:07 PM EDT by Mami Lim      enoxaparin (LOVENOX) injection 40 mg    Dose: 40 mg  Freq: DAILY Route: SC    * Milestone    Additional Notes    9/21-Care day 5       Transferred to [facility]       /74   Pulse 108   Temp 99.1 °F (37.3 °C) (Oral)   Resp 18   Ht 6' (1.829 m)   Wt 273 lb 5.9 oz (124 kg)   SpO2 95%   BMI 37.08 kg/m²       Intermed Note:    SUBJECTIVE:    Stable overnight. No other overnight issues reported. Patient seen and examined    Records reviewed. 9/18- Patient underwent ex lap with cholecystectomy, right hemicolectomy with small bowel resection and side to side ileocolonic stabled anastomosis, splenectomy, omentectomy.     States he feels sore post surgery     Lung:  clear to auscultation bilaterally; no use of accessory muscles; no rhonchi, rales, or crackles    Heart:  tachycardic without murmur, rub, or gallop Abdomen:  soft, post surgical abdomen ; normoactive bowel sounds; no organomegaly    ASSESSMENT and PLAN:    · Sepsis secondary to abdominal infection with possible interloop fisulta ? possible Crohns-General surgery following.  9/18- Patient underwent ex lap with cholecystectomy, right hemicolectomy with small bowel resection and side to side ileocolonic stabled anastomosis, splenectomy, omentectomy. · Acute acalculous cholecystitis- s/p cholecystectomy    · Acute anemia secondary to GI bleed- Transfuse <7    · Known bilateral pulmonary embolism s/p IVC filter placement due to recurrent bleeding    · Hypertension- Currently controlled continue to monitor    · Type 2 diabetes mellitus- Continue insulin    · SIMÓN, resolved    · Hypomagnesemia- Replace    · S/p splenectomy- post splenectomy vaccines will be needed prior to discharge     DISPOSITION: Continue current plan of care       ++++++++++++++++++++    Gen Surg Note:    CC: Sepsis         S.  Was transferred out of SICU yesterday    Patient notes passing some gas. HOSPITAL COURSE:    9/18 cholecystectomy, splenectomy, SBR and Right hemicolectomy      Plan    GI: post op. Dc ng. Await bowel function    Neuro:  Cymbalta on hold , Robaxin,  dilaudid PCA , robaxin    Renal: LR at 75 cc/hr. Monitor Uop.  Dc hernandez    Musculoskeletal: WBAT all extremities ,  AM-PAC Score pending    Pulmonary: Aggressive pulmonary hygiene , SMI , Monitor RR and Maintain SpO2 > 92%    Hx of PE--IVC filter present    ID:  No indication for Abx ,   Monitor leukocytosis and Monitor Fever Curve Post splen ectomy vaccinations prior to DC    Heme: Transfusion secondary to hb <7      Cardiac: Monitor Hemodynamics Home metoprolol ( IV)  , statin ( once enteral intact)    Endocrine: Hold  metformin,  Lantus 25U Angelika dose SSI - Will likely need to increase once eating     DVT Prophylaxis: PCDs, SQ Lovenox    Ulcer Prophylaxis: Home PPI    Tubes and Lines:      Seizure proph:     No Indication    Ancillary consults:   PT/OT      Family Update:         As available    CODE Status:       Full Code             Sepsis and Other Febrile Illness, without Focal Infection - Care Day 4 (9/20/2020) by Nai Alex RN         Review Status  Review Entered    Completed  9/23/2020 15:58        Criteria Review       Care Day: 4 Care Date: 9/20/2020 Level of Care: ICU    Guideline Day 2    Level Of Care    (X) ICU or floor    Clinical Status    ( ) * Hemodynamic stability    ( ) * Hypoxemia absent    9/23/2020 3:58 PM EDT by Sharla Avendano      lowest sat 88%    ( ) * Tachypnea absent    9/23/2020 3:58 PM EDT by Sharla Avendano      [id number]    Routes    (X) Possible IV fluids    9/23/2020 3:58 PM EDT by Sharla Avendano      IV LR bolus 1000ml x1 then 75/hr    (X) Parenteral or oral medications    9/23/2020 3:58 PM EDT by Sharla Avendano      IV Lasix 20mgx1  IV LR bolus 1000ml x1 then 75/hr  IV Mag Sulfate 4g x1  IV Robaxin 500mg Q6hrs  IV Protonix 40mg QD  IV Lopressor 5mg Q6hrs  Dilaudid PCA 0.2mg/ml    Interventions    (X) WBC    9/23/2020 3:58 PM EDT by Sharla Avendano      32.8    Medications    (X) Possible DVT prophylaxis    9/23/2020 3:58 PM EDT by Sharla Avendano      enoxaparin (LOVENOX) injection 40 mg    Dose: 40 mg  Freq: DAILY Route: SC    * Milestone    Additional Notes    9/20-Care Day 4       Remains In SICU       /77   Pulse 102   Temp 97.9 °F (36.6 °C) (Bladder)   Resp 16   Ht 6' (1.829 m)   Wt 273 lb 5.9 oz (124 kg)   SpO2 93%   BMI 37.08 kg/m²          Intermed  Note:    SUBJECTIVE:    Stable overnight. No other overnight issues reported. Patient seen and examined    Records reviewed. 9/18- Patient underwent ex lap with cholecystectomy, right hemicolectomy with small bowel resection and side to side ileocolonic stabled anastomosis, splenectomy, omentectomy.     He received 1 unit PRBC this am due to hemoglobin 6.3    Admits to fatigue    Lung:  clear to auscultation bilaterally; no use of accessory muscles; no rhonchi, rales, or crackles    Heart:  tachycardic without murmur, rub, or gallop    Abdomen:  soft, post surgical abdomen ; normoactive bowel sounds; no organomegaly    ASSESSMENT and PLAN:    · Sepsis secondary to abdominal infection with possible interloop fisulta ? possible Crohns-General surgery following.  9/18- Patient underwent ex lap with cholecystectomy, right hemicolectomy with small bowel resection and side to side ileocolonic stabled anastomosis, splenectomy, omentectomy.     · Acute acalculous cholecystitis- s/p cholecystectomy    · Acute anemia secondary to GI bleed- Received 1 unit PRBC this am. Transfuse <7    · Known bilateral pulmonary embolism s/p IVC filter placement due to recurrent bleeding    · Hypertension- Currently controlled continue to monitor    · Type 2 diabetes mellitus- Continue insulin    · SIMÓN, resolved    · Hypomagnesemia- Replace    · S/p splenectomy- post splenectomy vaccines will be needed prior to discharge     DISPOSITION: Continue current plan of care       +++++++++++++++++++++++    Critical Care Note:    9/20 Received 6L in fluid boluses yesterday into the night, Renal function improving starting to make more urine , States he passed flatus but still 550 from NGT    Plan    GI:  NPO  NGT Suction likely clamp and clears tomorrow,  path pending    Neuro:  Cymbalta on hold , Robaxin,  dilaudid PCA , robaxin    Renal: 150cc LR, SIMÓN Cr 1.2 improved  -Lactate normalized, replace Mg  , Monitor Urine Output, Daily CBC,BMP, Mg,Phos, ionized Ca      Musculoskeletal: WBAT all extremities ,  AM-PAC Score pending    Pulmonary: Aggressive pulmonary hygiene , SMI , Monitor RR and Maintain SpO2 > 92%    ID:  No indication for Abx , If suspecting infection will get procalcitonin likely WBC increasing secondary to splenectomy,  Monitor leukocytosis and Monitor Fever Curve Post splenectomy vaccinations prior to DC    Heme: Transfusion secondary to hb <7      Cardiac: Monitor Hemodynamics Home metoprolol ( IV)  , statin ( once enteral intact)    Endocrine: Hold  metformin,  Lantus 25U Angelika dose SSI - Will likely need to increase once eating              DVT Prophylaxis: PCDs, SQ Lovenox    Ulcer Prophylaxis: Home PPI    Tubes and Lines: Continue Central Line remove and place PIV, [place] for strict input and out put , remove a line , continue NGT    Seizure proph:     No Indication    Ancillary consults:   PT/OT      Family Update:         As available    CODE Status:       Full Code    Dispo:  Likely transfer from SICU today to Telemetry                Sepsis and Other Febrile Illness, without Focal Infection - Care Day 3 (9/19/2020) by Kristen Kwok RN         Review Status  Review Entered    Completed  9/23/2020 15:46        Criteria Review       Care Day: 3 Care Date: 9/19/2020 Level of Care: ICU    Guideline Day 2    Level Of Care    (X) ICU or floor    9/23/2020 3:46 PM EDT by Larisa Cost      Intermediate/Progressive Care    Clinical Status    ( ) * Hemodynamic stability    ( ) * Hypoxemia absent    ( ) * Tachypnea absent    Activity    (X) Activity as tolerated    9/23/2020 3:46 PM EDT by [employer]      Up as tolerated    Routes    (X) Possible IV fluids    9/23/2020 3:46 PM EDT by [employer] Cost      IV LR bolus [id number]    (X) Parenteral or oral medications    9/23/2020 3:46 PM EDT by [employer] Cost      IV Robaxin 500mg Q6hrs  IV Protonix 40mg QD  IV Lopressor 5mg Q6hrs  Dilaudid PCA 0.2mg/ml    Interventions    (X) WBC    9/23/2020 3:46 PM EDT by [employer]      26.3    Medications    (X) Possible antimicrobial treatment    9/23/2020 3:46 PM EDT by [employer] Cost      piperacillin-tazobactam (ZOSYN) 3.375 g in dextrose 5 % 100 mL IVPB extended infusion (mini-bag)    Dose: 3.375 g  Freq: EVERY 8 HOURS Route: IV    (X) Possible DVT prophylaxis    9/23/2020 3:46 PM EDT by Larisa Cost      enoxaparin (LOVENOX) injection 40 mg    Dose: 40 mg  Freq: DAILY Route: SC    * Milestone    Additional Notes    9/19-Care Day 3-Remains in SICU       /73   Pulse 114   Temp 97.7 °F (36.5 °C) (Bladder)   Resp 11   Ht 6' (1.829 m)   Wt 255 lb 4.7 oz (115.8 kg)   SpO2 98%   BMI 34.62 kg/m²       Gen Surg Note:    CC: Sepsis         HOSPITAL COURSE:    43y/o M with extension recent history with DKA, MI x 3 that was earlier in the summer and he was treated in [place].  Last month he was in the MICU with sepsis and GI bleed.  Cholecystostomy tube was placed and he had upper and lower endoscopy performed. Charlynne Plane was a cecal mass, but the pathology was benign. Sarbjit Ayala presented now with SOB and near syncope. Sarbjit Ayala still has his cholecystostomy tube. Sarbjit Ayala also has abdominal pain.  He denies any family history of Crohn's disease or UC.  He was also seen last month for splenic abscess. He was diagnosed with PE in the beginning of the summer and had an IVC filter placed earlier this month when he started having the GI bleeding.      9/19 Yesterday had cholecystectomy, splenectomy, SBR and Right hemicolectomy    New Imaging Reviewed:    KUB - NGT in good position         Cardiovascular:       Rate and Rhythm: Tachycardia present. Pulmonary:       Effort: Pulmonary effort is normal. No respiratory distress. Abdominal:       General: There is distension ( mild).       Tenderness: There is abdominal tenderness ( appropriate). There is no guarding or rebound.       Comments: Midline dressing mild stripe through      Musculoskeletal: Normal range of motion.        Plan    GI:  NPO  NGT Suction path pending    Neuro:  Cymbalta on hold , dilaudid PCA ,robaxin    Renal: 150cc LR, SIÓMN Cr 1.5 - Likely prerenal could be some aspect of contrast nephropathy, Lactate down trending Q6 hour checks, Monitor Urine Output, Daily CBC,BMP, Mg,Phos, ionized Ca      Musculoskeletal: WBAT all extremities ,  AM-PAC Score pending    Pulmonary: Aggressive pulmonary hygiene , SMI , Monitor RR and Maintain SpO2 > 92%    ID: Zosyn  24 hours perioperative then stop minimal intra op contamination Monitor leukocytosis and Monitor Fever Curve Post splenectomy vaccinations prior to DC    Heme: No indication for Transfusion    Cardiac: Monitor Hemodynamics Home metoprolol ( IV)  , statin ( once enteral intact)    Endocrine: Hold  metformin, Start Lantus 25U daily stop  Insulin ggt in 2 hours and give Angelika dose SSI          DVT Prophylaxis: PCDs, SQ Lovenox    Ulcer Prophylaxis: Home PPI    Tubes and Lines: Continue Central Line    Seizure proph:     No Indication    Ancillary consults:   PT/OT      Family Update:         As available    CODE Status:       Full Code       +++++++++++++++++++++++++    Intermed Note:    ASSESSMENT and PLAN:    · Sepsis secondary to abdominal infection with possible interloop fisulta ? possible Crohns-General surgery following. Zosyn. 9/18- Patient underwent ex lap with cholecystectomy, right hemicolectomy with small bowel resection and side to side ileocolonic stabled anastomosis, splenectomy, omentectomy. · Acute acalculous cholecystitis- s/p cholecystectomy    · Acute anemia secondary to GI bleed- hemoglobin dropped to 7.7 today. Type and screen. Transfuse <7    · Known bilateral pulmonary embolism s/p IVC filter placement due to recurrent bleeding    · Hypertension- Currently controlled continue to monitor    · Type 2 diabetes mellitus- Sliding scale insulin    · SIMÓN, resolved    · Hypomagnesemia- Replace    · S/p splenectomy- vaccines? ?

## 2020-09-23 NOTE — PROGRESS NOTES
Hospitalist Progress Note      PCP: IGOR Arguelles CNP    Date of Admission: 9/17/2020  Days in the hospital: [address] Course:   Patient admitted on 9/17/2020 presented with abdominal pain, bleeding from rectum, and dizziness. He does admit to some shortness of breath that also started day of presentation that is associated with exertion. He denies any chest pain. Rd Bhatti was recently admitted previously for anemia from GI bleed due to recently diagnosed colon mass. He has a history of bilateral pulmonary embolism and IVC filter was recently placed in the setting of blood clots with recurrent bleeding. He also has JULIO drain from being previously admitted to MICU with sepsis from gallbladder or splenic abscess.  Patient admitted to SICU for sepsis and abdominal infection. 9/18- Patient underwent ex lap with cholecystectomy, right hemicolectomy with small bowel resection and side to side ileocolonic stabled anastomosis, splenectomy, omentectomy.        Subjective  Patient seen and examined at bedside. Complaining of some abdominal pain, denies any nausea or vomiting, tolerating diet. Hemoglobin trending down. Exam:    /68   Pulse 115   Temp 98.1 °F (36.7 °C) (Oral)   Resp 18   Ht 6' (1.829 m)   Wt 265 lb 10.5 oz (120.5 kg)   SpO2 93%   BMI 36.03 kg/m²     HEENT: + Pallor, no icterus. Respiratory:  CTA, good air entry. Cardiovascular: RRR, no murmur. Abdomen: Soft, staples noted midline  Musculoskeletal: No joint pains or joint swelling noted.    Neurologic: awake, alert and following commands       Assessment/Plan:  · Sepsis  · Abdominal infection with possible fistula  · Acute acalculous cholecystitis  · Anemia  · History of bilateral PE status post IVC filter placement  · Hypertension  · Diabetes mellitus type 2  · SIMÓN  · Hypomagnesemia  · Status post splenectomy  · Postop acute blood loss anemia     Plan:  Status post surgery, patient underwent exploratory laparotomy with cholecystectomy, right hemicolectomy and small bowel resection and side-to-side ileal colonic anastomosis, splenectomy and omentectomy, currently off antibiotics, leukocytosis improving, follow-up with surgery, tolerating diet    Status post cholecystectomy, follow-up with surgery     Hemoglobin trending down, will transfuse 1 unit of PRBC, patient agreeable     Blood pressure control     Monitor blood sugars closely, continue current dose of insulin     Replaced potassium     DC ECF upon discharge         Labs:   Recent Labs     09/21/20  0600 09/22/20  0400 09/23/20  0552   WBC 29.4* 27.9* 26.8*   HGB 7.9* 7.7* 7.5*   HCT 24.8* 23.9* 23.4*   * 542* 677*     Recent Labs     09/21/20 0600 09/22/20  0400 09/23/20  0552    135 138   K 3.7 3.4* 3.6   CL 99 98 101   CO2 24 25 24   BUN 12 7 8   CREATININE 0.7 0.6* 0.6*   CALCIUM 8.7 8.6 8.3*   PHOS 3.2  --   --      Recent Labs     09/21/20  0600 09/22/20  0400   AST 42* 19   ALT 50* 35   BILIDIR <0.2 <0.2   BILITOT 0.3 0.3   ALKPHOS 144* 165*     No results for input(s): INR in the last 72 hours. No results for input(s): Bety Boogie in the last 72 hours.     Medications:  Reviewed    Infusion Medications    lactated ringers      lactated ringers      dextrose       Scheduled Medications    0.9 % sodium chloride  250 mL Intravenous Once    pantoprazole  40 mg Oral QAM AC    acetaminophen  650 mg Oral Q6H    methocarbamol  500 mg Oral 4x Daily    insulin glargine  25 Units Subcutaneous Daily    insulin lispro  0-18 Units Subcutaneous Q4H    enoxaparin  40 mg Subcutaneous Daily    metoprolol  5 mg Intravenous Q6H    fluticasone  2 spray Nasal Daily    sodium chloride flush  10 mL Intravenous 2 times per day     PRN Meds: oxyCODONE **OR** oxyCODONE, glucose, dextrose, glucagon (rDNA), dextrose, acetaminophen **OR** acetaminophen, sodium chloride flush, [DISCONTINUED] promethazine **OR** ondansetron      Intake/Output Summary (Last 24 hours) at 9/23/2020 1038  Last data filed at 9/23/2020 0630  Gross per 24 hour   Intake 200 ml   Output 100 ml   Net 100 ml     · Body mass index is 36.03 kg/m². · Diet  · DIET LOW FIBER;  · Dietary Nutrition Supplements: Wound Healing Oral Supplement    · Code Status  · Full Code       Electronically signed by Kristopher Gorman MD on 9/23/2020 at 10:38 AM  [employer]   Please contact me through perfect [employer]    NOTE: This report was transcribed using voice recognition software. Every effort was made to ensure accuracy; however, inadvertent computerized transcription errors may be present.

## 2020-09-23 NOTE — PROGRESS NOTES
[facility]  SURGICAL INTENSIVE CARE UNIT (SICU)  ATTENDING PHYSICIAN CRITICAL CARE PROGRESS NOTE     I have examined the patient, reviewed the record,and discussed the case with the APN/  Resident. I have reviewed all relevant labs and imaging data. The following summarizes my clinical findings and independent assessment. Date of admission:  9/17/2020    CC: Sepsis     S. Pt is voiding. + flatus. + BM    HOSPITAL COURSE:   9/18 cholecystectomy, splenectomy, SBR and Right hemicolectomy      NAD  Abdomen soft midline inc c/d/i, approp tender    Assessment   Active Problems:    SIMÓN (acute kidney injury) ([facility]    Septicemia ([facility]    Intra-abdominal infection  Resolved Problems:    * No resolved hospital problems. *      Plan   GI: post op.  General diet   midline incision c/d/i  Increase activity  Consult ID for post splenectomy vaccines    DVT Prophylaxis: PCDs, SQ Lovenox       Derian Joseph MD, FACS  9/23/2020  4:39 PM

## 2020-09-23 NOTE — PROGRESS NOTES
Physical Therapy  Physical Therapy   Treatment Note     Name: Palma Cedillo  : 1975  MRN: [id number]    Referring Provider:  Brent Knott DO     Date of Service: 2020    Evaluating PT:  Sabrina Salmon PT, DPT. [id number]    Room #:  8580/0387-U  Diagnosis:  SIMÓN   Reason for admission:  Dizziness, rectal bleed  Precautions:  Falls, PCA pump, JULIO drain, abdominal precautions, NGT, R wrist drop  Pertinent PMHx: OA, HTN, HLD, DM, fibromyalgia, depression, back pain, SIMÓN  Procedures:  ex lap, cholecystectomy, bowel resection, R hemicolectomy  Equipment Recommendations:  TBD    SUBJECTIVE:  Pt admitted from [address] [place] [address]. States he is ambulatory using a cane or wheelchair user for longer distances. OBJECTIVE:   Initial Evaluation  Date:  Treatment  Date:  Short Term/ Long Term   Goals   AM-PAC 6 Clicks 81/49 54/87    Was pt agreeable to Eval/treatment? Yes  Yes with encouragement     Does pt have pain? \"30/10\" abdomen  \"15/10\" abdominal pain    Bed Mobility  Rolling: SBA  Supine to sit: Corrie  Sit to supine: Corrie  Scooting: SBA Rolling: Supervision  Supine<>sit: Supervision  Scooting: Supervision Independent    Transfers Sit to stand: NT  Stand to sit: NT  Stand pivot: NT Sit to stand SBA  Stand to sit SBA Independent    Ambulation    NT, pt refused d/t pain    25 feet with ww with min A >200 feet with AAD Mod I   Stair negotiation: ascended and descended  NT NT TBD   ROM BUE:  See OT eval   BLE:  WFL BLE: WFL    Strength BUE:  See OT eval   BLE:  knee ext 5/5  Ankle DF 5/5 BLE: grossly 5/5 Increase by 1/3 MMT grade    Balance Sitting EOB:  SBA  Dynamic Standing:  NT  Sitting EOB:  indep  Dynamic Standing:  indep         Therapeutic Exercises:    Sit<>stand x2 with SBA      Patient education  Pt educated on safety with mobility .      Patient response to education:   Pt verbalized understanding Pt demonstrated skill Pt requires further education in this area   x Inconsistently  x ASSESSMENT:    Comments:  Nursing cleared pt for physical therapy. Pt in bed upon arrival and reluctantly agreed to participate in therapy with encouragement. Pt educated on splinting technique with mobility for increased comfort. Pt completed functional mobility as noted above. Pt with some impulsivity with mobility. Poor tolerance to activity. Much encouragement given for pt to sit in bed side chair to promote out of bed activity. Pt declined on multiple attempts stating increased abdominal pain. Nursing notified of pt's complaints of pain. Encouraged pt to raise head of bed to promote upright posture. Pt only able to tolerate head of bed at 30 degrees due to complaints of pain . Pt's heart rate monitored throughout activity 130-150bpm and nursing notified. Pt left in bed with call light in reach. Treatment:  Patient practiced and was instructed in the following treatment:     Bed mobility training - Verbal instruction  to facilitate proper technique with bed mobility. Assistance required to complete task.  Transfer training: Verbal instruction  for hand placement and technique with transfers to improve safety   Gait training: Verbal instruction for proper walker use and safety with gait. Assistance required to complete task. PLAN:    Patient is making good progress towards established goals. Will continue with current POC. Time in  0855  Time out  0915    Total Treatment Time  20 minutes     CPT codes:  [] Gait training 10415 0 minutes  [] Manual therapy 83421 0 minutes  [x] Therapeutic activities 02009 20 minutes  [] Therapeutic exercises 13981 0 minutes  [] Neuromuscular reeducation 47277 0 minutes    Luann Santizo.  Homer Knox, [address]  number:  [id number]

## 2020-09-23 NOTE — CONSULTS
[address] [place] [address] [facility]  Consultation Note     Admit Date: 9/17/2020  2:10 PM    Reason for Consult:   Post splenectomy vaccinations    Attending Physician:  Abe Canada MD    HISTORY OF PRESENT ILLNESS:             The history is obtained from extensive review of available past medical records. The patient is a 53 y.o. male who is known to the ID service. Admitted to [facility] on 7/31/2020. The patient had been at [facility] in early June 2020 where he was treated for pulmonary embolism. He was found to have either a splenic infarct versus an abscess. Blood cultures were negative. He had a coccygeal wound that was infected and debrided. Wound cultures grew Prevotella, Fusobacterium, Klebsiella and Enterobacter. He was discharged to a nursing home. He was admitted to the hospital with abdominal pain. Seen by ID for concern for splenic abscess and possible cholecystitis. Treated with Zosyn. A cholecystostomy tube was placed. Biliary fluid cultures were negative. The coccygeal wound was colonized with Corynebacteria and Pseudomonas. Antibiotics were not warranted for this. He was discharged to [address] [place] [address] on [address] [facility]. The patient was ordered vaccinations on 8/11/2020. They were dispensed by pharmacy but never getting by by nursing. 2 days later they will return to the pharmacy after the patient who was discharged. The patient was readmitted to the hospital on 9/17/2020 with dizziness. He was admitted with a diagnosis of septicemia to the SICU. He underwent exploratory laparotomy, cholecystectomy, right hemicolectomy with small bowel resection and side-to-side ileal colonic stapled anastomosis, omentectomy and splenectomy on 9/18/2020. The patient received Zosyn until 9/20/2020. He does not have diarrhea now. He does admit to having abdominal pain. ID was asked to see him in consultation to once for vaccinations.     Past Medical History: Diagnosis Date    Accident 11/2019    stepped on nail rt ft about 1 month ago- healed per patient    SIMÓN (acute kidney injury) ([facility] 2008 apx    kidney bruised due to fall / [place] [facility]iel[place]    Allergic rhinitis     Chronic back pain     Depression     Diabetes mellitus ([facility]     Difficulty sleeping     at times    Displacement of lumbar intervertebral disc without myelopathy     Fibromyalgia     Fractured rib     2008 / healed    H/O seasonal allergies     Head injury 1980'S apx    no residual s/s    Hyperlipidemia     Hypertension     Obesity (BMI 35.0-39.9 without comorbidity)     bmi 39.2  weight 296 #    Osteoarthritis     Thoracic or lumbosacral neuritis or radiculitis, unspecified      July 2020. Admitted to [facility]. The patient had been at [facility] in early June 2020 where he was treated for pulmonary embolism. He was found to have either a splenic infarct versus an abscess. Blood cultures were negative. He had a coccygeal wound that was infected and debrided. Wound cultures grew Prevotella, Fusobacterium, Klebsiella and Enterobacter. He was discharged to a nursing home. He was admitted to the hospital with abdominal pain. Seen by ID for concern for splenic abscess and possible cholecystitis. Treated with Zosyn. A cholecystostomy tube was placed. Biliary fluid cultures were negative. The coccygeal wound was colonized with Corynebacteria and Pseudomonas. Antibiotics were not warranted for this. He was discharged to [address] [place] [address] on 1400 [facility]. The patient was ordered vaccinations on 8/11/2020. They were dispensed by pharmacy but never getting by by nursing. 2 days later they will return to the pharmacy after the patient who was discharged. 7/1/2019. Admitted to [facility] with swelling, redness and pain in his left foot. He had recently had surgery (left foot tarsal tunnel release 6/20/19) by Dr. Brandy Pollock.   Seen by Dr. Mandy South and treated for left surgical site infection with Vancomycin and Cefepime. Cultures grew Pantoea and MRSA. He was discharged on Augmentin and Doxycycline.     Past Surgical History:        Procedure Laterality Date    COLONOSCOPY N/A 9/5/2020    COLONOSCOPY WITH BIOPSY performed by Sunshine Petersen MD at [address] PTC NEW ACCESS  8/3/2020    CT PTC NEW ACCESS 8/3/2020 [facility] CT    FOOT SURGERY Right 1985    to treat shattered bones    HERNIA REPAIR  2001    DOUBLE HERNIA    HERNIA REPAIR Right 12/9/2019    LAPAROSCOPIC RIGHT INGUINAL HERNIA REPAIR, MESH 10x15 cm PLACEMENT performed by Pallavi Castrejon MD at [facility] Left 4/11/2016    LAPAROTOMY N/A 9/18/2020    LAPAROTOMY EXPLORATORY, CHOLECYSTECTOMY, BOWEL RESECTION, right darian colectomy, partial omentectomy, and splenectomy performed by Sunshine Petersen MD at [address] [facility] N/A 5/7/2018    COLONOSCOPY DIAGNOSTIC performed by Russ Najera MD at [facility] Left 2014    Dr. Davis Session ARTHROSCOPY Left 11 21 14    SHOULDER SURGERY  2001 &2007    RIGHT AND LEFT repair of tears    TOTAL HIP ARTHROPLASTY Right 10/31/2016    Total right hip  Fernando Gonzalez MD    UPPER GASTROINTESTINAL ENDOSCOPY N/A 9/4/2020    EGD DIAGNOSTIC ONLY performed by Havery Peabody, MD at [facility] 1  2007    LEFT WRIST     Current Medications:   Scheduled Meds:   pantoprazole  40 mg Oral QAM AC    acetaminophen  650 mg Oral Q6H    methocarbamol  500 mg Oral 4x Daily    insulin glargine  25 Units Subcutaneous Daily    insulin lispro  0-18 Units Subcutaneous Q4H    enoxaparin  40 mg Subcutaneous Daily    metoprolol  5 mg Intravenous Q6H    fluticasone  2 spray Nasal Daily    sodium chloride flush  10 mL Intravenous 2 times per day     Continuous Infusions:   lactated ringers      lactated ringers      dextrose       PRN Meds:oxyCODONE **OR** oxyCODONE, glucose, dextrose, glucagon (rDNA), dextrose, acetaminophen **OR** acetaminophen, sodium chloride flush, [DISCONTINUED] promethazine **OR** ondansetron    Allergies:  Seasonal and Tramadol    Social History:   Social History     Socioeconomic History    Marital status:      Spouse name: None    Number of children: None    Years of education: None    Highest education level: None   Occupational History    Occupation: disabled from     Social Needs    Financial resource strain: None    Food insecurity     Worry: None     Inability: None    Transportation needs     Medical: None     Non-medical: None   Tobacco Use    Smoking status: Never Smoker    Smokeless tobacco: Current User     Types: Chew   Substance and Sexual Activity    Alcohol use: Not Currently     Alcohol/week: 0.0 standard drinks     Frequency: Monthly or less    Drug use: No    Sexual activity: Not Currently   Lifestyle    Physical activity     Days per week: None     Minutes per session: None    Stress: None   Relationships    Social connections     Talks on phone: None     Gets together: None     Attends Voodoo service: None     Active member of club or organization: None     Attends meetings of clubs or organizations: None     Relationship status: None    Intimate partner violence     Fear of current or ex partner: None     Emotionally abused: None     Physically abused: None     Forced sexual activity: None   Other Topics Concern    None   Social History Narrative    None      The patient drives a truck for living. Family History:       Problem Relation Age of Onset    Hypertension Mother     Arthritis Father     Diabetes Father     Cancer Maternal Grandfather         Skin    Cancer Paternal Uncle         skin    Diabetes Paternal Grandfather     Heart Disease Maternal Grandmother     Stroke Maternal Aunt    . Otherwise non-pertinent to the chief complaint.     REVIEW OF SYSTEMS: Constitutional: Negative for fevers, chills, diaphoresis  Neurologic: Negative   Psychiatric: Negative  Rheumatologic: Negative   Endocrine: Negative  Hematologic: Negative  Immunologic: Negative  ENT: Negative  Respiratory: Negative   Cardiovascular: Negative  GI: Abdominal pain. Diarrhea. : Negative  Musculoskeletal: Negative  Skin: No rashes. PHYSICAL EXAM:    Vitals:   /68   Pulse 115   Temp 98.1 °F (36.7 °C) (Oral)   Resp 18   Ht 6' (1.829 m)   Wt 265 lb 10.5 oz (120.5 kg)   SpO2 93%   BMI 36.03 kg/m²   Constitutional: The patient is awake, alert, and oriented. Skin: Warm and dry. No rashes were noted. HEENT: Eyes show round, and reactive pupils. No jaundice. Moist mucous membranes, no ulcerations, no thrush. Neck: Supple to movements. No lymphadenopathy. Chest: No use of accessory muscles to breathe. Symmetrical expansion. Auscultation reveals no wheezing, crackles, or rhonchi. Cardiovascular: S1 and S2 are rhythmic and regular. No murmurs appreciated. Abdomen: Positive bowel sounds to auscultation. Benign to palpation. No masses felt. No hepatosplenomegaly. Extremities: No clubbing, no cyanosis, no edema.   Lines: peripheral      CBC+dif:  Recent Labs     09/21/20  0600 09/22/20  0400 09/23/20  0552   WBC 29.4* 27.9* 26.8*   HGB 7.9* 7.7* 7.5*   HCT 24.8* 23.9* 23.4*   MCV 87.9 86.9 87.6   * 542* 677*     Lab Results   Component Value Date    CRP 3.9 (H) 08/01/2020    CRP 7.6 (H) 06/30/2019      No results found for: CRPHS  Lab Results   Component Value Date    SEDRATE 28 (H) 06/30/2019     Lab Results   Component Value Date    ALT 35 09/22/2020    AST 19 09/22/2020    ALKPHOS 165 (H) 09/22/2020    BILITOT 0.3 09/22/2020     Lab Results   Component Value Date     09/23/2020    K 3.6 09/23/2020    K 4.5 09/17/2020     09/23/2020    CO2 24 09/23/2020    BUN 8 09/23/2020    CREATININE 0.6 09/23/2020    GFRAA >60 09/23/2020    LABGLOM >60 09/23/2020    GLUCOSE 104 09/23/2020    GLUCOSE 125 05/11/2012    PROT 5.2 09/22/2020    LABALBU 2.2 09/22/2020    LABALBU 4.8 05/11/2012    CALCIUM 8.3 09/23/2020    BILITOT 0.3 09/22/2020    ALKPHOS 165 09/22/2020    AST 19 09/22/2020    ALT 35 09/22/2020       Lab Results   Component Value Date    PROTIME 13.9 09/17/2020    INR 1.2 09/17/2020       Lab Results   Component Value Date    TSH 0.833 09/18/2020       Lab Results   Component Value Date    COLORU Yellow 09/17/2020    PHUR 6.0 09/17/2020    CLARITYU Clear 09/17/2020    SPECGRAV 1.010 09/17/2020    LEUKOCYTESUR Negative 09/17/2020    UROBILINOGEN 0.2 09/17/2020    BILIRUBINUR Negative 09/17/2020    BLOODU Negative 09/17/2020    GLUCOSEU Negative 09/17/2020       No results found for: HCO3, BE, O2SAT, PH, THGB, PCO2, PO2, TCO2  Radiology:  Noted    Microbiology:  Pending  No results for input(s): BC in the last 72 hours. No results for input(s): ORG in the last 72 hours. No results for input(s): Marval Terry in the last 72 hours. No results for input(s): STREPNEUMAGU in the last 72 hours. No results for input(s): LP1UAG in the last 72 hours. No results for input(s): ASO in the last 72 hours. No results for input(s): CULTRESP in the last 72 hours. No results for input(s): PROCAL in the last 72 hours. Assessment:  · Status post cholecystectomy, right hemicolectomy with small bowel resection and side-to-side ileal colonic stapled anastomosis, omentectomy and splenectomy on 9/18/2020. The patient received Zosyn until 9/20/2020. · Antibiotic associated diarrhea  · Possible C. difficile infection with colitis  · Leukocytosis secondary to splenectomized state  ·     Plan:    · Haemophilus influenza, meningococcal group B, meningococcal polysaccharide and Prevnar 13 vaccinations today  · Check cultures, baseline ESR, CRP  · Stools for C. difficile  · Will follow with you    Thank you for having us see this patient in consultation.  I will be discussing this case with the treating physicians.   Discussed with pharmacy    Patrick Mcdonnell  10:11 AM  9/23/2020

## 2020-09-24 LAB
ANION GAP SERPL CALCULATED.3IONS-SCNC: 10 MMOL/L (ref 7–16)
BUN BLDV-MCNC: 7 MG/DL (ref 6–20)
C DIFF TOXIN/ANTIGEN: NORMAL
C-REACTIVE PROTEIN: 13.4 MG/DL (ref 0–0.4)
CALCIUM SERPL-MCNC: 8.4 MG/DL (ref 8.6–10.2)
CHLORIDE BLD-SCNC: 103 MMOL/L (ref 98–107)
CO2: 25 MMOL/L (ref 22–29)
CREAT SERPL-MCNC: 0.6 MG/DL (ref 0.7–1.2)
GFR AFRICAN AMERICAN: >60
GFR NON-AFRICAN AMERICAN: >60 ML/MIN/1.73
GLUCOSE BLD-MCNC: 88 MG/DL (ref 74–99)
HCT VFR BLD CALC: 25.3 % (ref 37–54)
HEMOGLOBIN: 8.1 G/DL (ref 12.5–16.5)
MCH RBC QN AUTO: 28.2 PG (ref 26–35)
MCHC RBC AUTO-ENTMCNC: 32 % (ref 32–34.5)
MCV RBC AUTO: 88.2 FL (ref 80–99.9)
METER GLUCOSE: 112 MG/DL (ref 74–99)
METER GLUCOSE: 118 MG/DL (ref 74–99)
METER GLUCOSE: 83 MG/DL (ref 74–99)
METER GLUCOSE: 87 MG/DL (ref 74–99)
METER GLUCOSE: 91 MG/DL (ref 74–99)
PDW BLD-RTO: 17.2 FL (ref 11.5–15)
PLATELET # BLD: 748 E9/L (ref 130–450)
PMV BLD AUTO: 8.6 FL (ref 7–12)
POTASSIUM SERPL-SCNC: 3 MMOL/L (ref 3.5–5)
RBC # BLD: 2.87 E12/L (ref 3.8–5.8)
SEDIMENTATION RATE, ERYTHROCYTE: 95 MM/HR (ref 0–15)
SODIUM BLD-SCNC: 138 MMOL/L (ref 132–146)
WBC # BLD: 23.8 E9/L (ref 4.5–11.5)

## 2020-09-24 PROCEDURE — 90472 IMMUNIZATION ADMIN EACH ADD: CPT | Performed by: SPECIALIST

## 2020-09-24 PROCEDURE — 86140 C-REACTIVE PROTEIN: CPT

## 2020-09-24 PROCEDURE — 90471 IMMUNIZATION ADMIN: CPT | Performed by: SPECIALIST

## 2020-09-24 PROCEDURE — 85027 COMPLETE CBC AUTOMATED: CPT

## 2020-09-24 PROCEDURE — 90647 HIB PRP-OMP VACC 3 DOSE IM: CPT | Performed by: SPECIALIST

## 2020-09-24 PROCEDURE — 6360000002 HC RX W HCPCS: Performed by: SPECIALIST

## 2020-09-24 PROCEDURE — 85651 RBC SED RATE NONAUTOMATED: CPT

## 2020-09-24 PROCEDURE — 2500000003 HC RX 250 WO HCPCS: Performed by: STUDENT IN AN ORGANIZED HEALTH CARE EDUCATION/TRAINING PROGRAM

## 2020-09-24 PROCEDURE — 2060000000 HC ICU INTERMEDIATE R&B

## 2020-09-24 PROCEDURE — 80048 BASIC METABOLIC PNL TOTAL CA: CPT

## 2020-09-24 PROCEDURE — 97530 THERAPEUTIC ACTIVITIES: CPT

## 2020-09-24 PROCEDURE — 82962 GLUCOSE BLOOD TEST: CPT

## 2020-09-24 PROCEDURE — 90734 MENACWYD/MENACWYCRM VACC IM: CPT | Performed by: SPECIALIST

## 2020-09-24 PROCEDURE — 2580000003 HC RX 258: Performed by: STUDENT IN AN ORGANIZED HEALTH CARE EDUCATION/TRAINING PROGRAM

## 2020-09-24 PROCEDURE — 6370000000 HC RX 637 (ALT 250 FOR IP): Performed by: INTERNAL MEDICINE

## 2020-09-24 PROCEDURE — 6360000002 HC RX W HCPCS: Performed by: STUDENT IN AN ORGANIZED HEALTH CARE EDUCATION/TRAINING PROGRAM

## 2020-09-24 PROCEDURE — 6370000000 HC RX 637 (ALT 250 FOR IP): Performed by: STUDENT IN AN ORGANIZED HEALTH CARE EDUCATION/TRAINING PROGRAM

## 2020-09-24 RX ORDER — POTASSIUM CHLORIDE 20 MEQ/1
40 TABLET, EXTENDED RELEASE ORAL ONCE
Status: COMPLETED | OUTPATIENT
Start: 2020-09-24 | End: 2020-09-24

## 2020-09-24 RX ADMIN — ACETAMINOPHEN 650 MG: 325 TABLET, FILM COATED ORAL at 16:45

## 2020-09-24 RX ADMIN — Medication 10 ML: at 20:54

## 2020-09-24 RX ADMIN — METHOCARBAMOL TABLETS 500 MG: 500 TABLET, COATED ORAL at 14:26

## 2020-09-24 RX ADMIN — HAEMOPHILUS B CONJUGATE VACCINE (MENINGOCOCCAL PROTEIN CONJUGATE) 7.5 MCG: 7.5 INJECTION, SUSPENSION INTRAMUSCULAR at 00:03

## 2020-09-24 RX ADMIN — ACETAMINOPHEN 650 MG: 325 TABLET, FILM COATED ORAL at 06:05

## 2020-09-24 RX ADMIN — METHOCARBAMOL TABLETS 500 MG: 500 TABLET, COATED ORAL at 08:29

## 2020-09-24 RX ADMIN — PANTOPRAZOLE SODIUM 40 MG: 40 TABLET, DELAYED RELEASE ORAL at 06:05

## 2020-09-24 RX ADMIN — METOPROLOL TARTRATE 5 MG: 5 INJECTION INTRAVENOUS at 08:29

## 2020-09-24 RX ADMIN — OXYCODONE HYDROCHLORIDE 10 MG: 10 TABLET ORAL at 11:29

## 2020-09-24 RX ADMIN — Medication 10 ML: at 08:30

## 2020-09-24 RX ADMIN — METOPROLOL TARTRATE 5 MG: 5 INJECTION INTRAVENOUS at 20:54

## 2020-09-24 RX ADMIN — ACETAMINOPHEN 650 MG: 325 TABLET, FILM COATED ORAL at 00:20

## 2020-09-24 RX ADMIN — METOPROLOL TARTRATE 5 MG: 5 INJECTION INTRAVENOUS at 14:26

## 2020-09-24 RX ADMIN — OXYCODONE HYDROCHLORIDE 10 MG: 10 TABLET ORAL at 04:25

## 2020-09-24 RX ADMIN — METHOCARBAMOL TABLETS 500 MG: 500 TABLET, COATED ORAL at 16:45

## 2020-09-24 RX ADMIN — OXYCODONE HYDROCHLORIDE 10 MG: 10 TABLET ORAL at 15:27

## 2020-09-24 RX ADMIN — MENINGOCOCCAL (GROUPS A, C, Y AND W-135) OLIGOSACCHARIDE DIPHTHERIA CRM197 CONJUGATE VACCINE 0.5 ML: KIT at 00:11

## 2020-09-24 RX ADMIN — OXYCODONE HYDROCHLORIDE 10 MG: 10 TABLET ORAL at 19:30

## 2020-09-24 RX ADMIN — POTASSIUM CHLORIDE 40 MEQ: 1500 TABLET, EXTENDED RELEASE ORAL at 11:29

## 2020-09-24 RX ADMIN — METOPROLOL TARTRATE 5 MG: 5 INJECTION INTRAVENOUS at 02:45

## 2020-09-24 RX ADMIN — OXYCODONE HYDROCHLORIDE 10 MG: 10 TABLET ORAL at 08:29

## 2020-09-24 RX ADMIN — ACETAMINOPHEN 650 MG: 325 TABLET, FILM COATED ORAL at 14:25

## 2020-09-24 RX ADMIN — METHOCARBAMOL TABLETS 500 MG: 500 TABLET, COATED ORAL at 20:54

## 2020-09-24 RX ADMIN — ENOXAPARIN SODIUM 40 MG: 40 INJECTION SUBCUTANEOUS at 08:29

## 2020-09-24 RX ADMIN — POTASSIUM CHLORIDE 40 MEQ: 1500 TABLET, EXTENDED RELEASE ORAL at 14:45

## 2020-09-24 ASSESSMENT — PAIN DESCRIPTION - PAIN TYPE
TYPE: ACUTE PAIN;SURGICAL PAIN
TYPE: SURGICAL PAIN

## 2020-09-24 ASSESSMENT — PAIN SCALES - GENERAL
PAINLEVEL_OUTOF10: 9
PAINLEVEL_OUTOF10: 8
PAINLEVEL_OUTOF10: 9
PAINLEVEL_OUTOF10: 10
PAINLEVEL_OUTOF10: 9
PAINLEVEL_OUTOF10: 10
PAINLEVEL_OUTOF10: 7
PAINLEVEL_OUTOF10: 9
PAINLEVEL_OUTOF10: 8
PAINLEVEL_OUTOF10: 9
PAINLEVEL_OUTOF10: 9

## 2020-09-24 ASSESSMENT — PAIN DESCRIPTION - DESCRIPTORS
DESCRIPTORS: ACHING;DISCOMFORT;NAGGING
DESCRIPTORS: ACHING;DISCOMFORT

## 2020-09-24 ASSESSMENT — PAIN DESCRIPTION - PROGRESSION: CLINICAL_PROGRESSION: NOT CHANGED

## 2020-09-24 ASSESSMENT — PAIN - FUNCTIONAL ASSESSMENT: PAIN_FUNCTIONAL_ASSESSMENT: ACTIVITIES ARE NOT PREVENTED

## 2020-09-24 ASSESSMENT — PAIN DESCRIPTION - ORIENTATION: ORIENTATION: MID

## 2020-09-24 ASSESSMENT — PAIN DESCRIPTION - ONSET: ONSET: ON-GOING

## 2020-09-24 ASSESSMENT — PAIN DESCRIPTION - FREQUENCY
FREQUENCY: CONTINUOUS
FREQUENCY: CONTINUOUS

## 2020-09-24 ASSESSMENT — PAIN DESCRIPTION - LOCATION
LOCATION: ABDOMEN
LOCATION: ABDOMEN;BACK

## 2020-09-24 NOTE — PROGRESS NOTES
[address] [place] [address] [facility]  Progress Note    SUBJECTIVE:  Chief Complaint   Patient presents with    Dizziness     from NH, dizziness today, have a JULIO drain that is draining his gallbladder. The patient still complaining of some abdominal pain. He still has diarrhea. He refuses vaccinations without any problems. Complains of soreness in arms. Review of systems:  As stated above in the chief complaint, otherwise negative. Medications:  Scheduled Meds:   potassium chloride  40 mEq Oral Once    pantoprazole  40 mg Oral QAM AC    acetaminophen  650 mg Oral Q6H    methocarbamol  500 mg Oral 4x Daily    insulin glargine  25 Units Subcutaneous Daily    insulin lispro  0-18 Units Subcutaneous Q4H    enoxaparin  40 mg Subcutaneous Daily    metoprolol  5 mg Intravenous Q6H    fluticasone  2 spray Nasal Daily    sodium chloride flush  10 mL Intravenous 2 times per day     Continuous Infusions:   lactated ringers      lactated ringers      dextrose       PRN Meds:oxyCODONE **OR** oxyCODONE, glucose, dextrose, glucagon (rDNA), dextrose, acetaminophen **OR** acetaminophen, sodium chloride flush, [DISCONTINUED] promethazine **OR** ondansetron    OBJECTIVE:  /69   Pulse 108   Temp 96.1 °F (35.6 °C) (Temporal)   Resp 17   Ht 6' (1.829 m)   Wt 265 lb 10.5 oz (120.5 kg)   SpO2 96%   BMI 36.03 kg/m²   Temp  Av.2 °F (36.2 °C)  Min: 96.1 °F (35.6 °C)  Max: 97.9 °F (36.6 °C)  Constitutional: The patient is awake, alert, and oriented. Lying in bed. No distress. Skin: Warm and dry. No rashes were noted. HEENT: Round and reactive pupils. Moist mucous membranes. No ulcerations or thrush. Neck: Supple to movements. Chest: No use of accessory muscles to breathe. Symmetrical expansion. No wheezing, crackles or rhonchi. Cardiovascular: S1 and S2 are rhythmic and regular. No murmurs appreciated. Abdomen: Positive bowel sounds to auscultation. Midline incision. Tender to palpation. Extremities: No edema. Lines: peripheral    Laboratory and Tests Review:  Lab Results   Component Value Date    WBC 23.8 (H) 09/24/2020    WBC 26.8 (H) 09/23/2020    WBC 27.9 (H) 09/22/2020    HGB 8.1 (L) 09/24/2020    HCT 25.3 (L) 09/24/2020    MCV 88.2 09/24/2020     (H) 09/24/2020     Lab Results   Component Value Date    NEUTROABS 20.55 (H) 09/18/2020    NEUTROABS 13.66 (H) 09/17/2020    NEUTROABS 4.23 09/03/2020     No results found for: [facility]  Lab Results   Component Value Date    ALT 35 09/22/2020    AST 19 09/22/2020    ALKPHOS 165 (H) 09/22/2020    BILITOT 0.3 09/22/2020     Lab Results   Component Value Date     09/24/2020    K 3.0 09/24/2020    K 4.5 09/17/2020     09/24/2020    CO2 25 09/24/2020    BUN 7 09/24/2020    CREATININE 0.6 09/24/2020    CREATININE 0.6 09/23/2020    CREATININE 0.6 09/22/2020    GFRAA >60 09/24/2020    LABGLOM >60 09/24/2020    GLUCOSE 88 09/24/2020    GLUCOSE 125 05/11/2012    PROT 5.2 09/22/2020    LABALBU 2.2 09/22/2020    LABALBU 4.8 05/11/2012    CALCIUM 8.4 09/24/2020    BILITOT 0.3 09/22/2020    ALKPHOS 165 09/22/2020    AST 19 09/22/2020    ALT 35 09/22/2020     Lab Results   Component Value Date    CRP 13.4 (H) 09/24/2020    CRP 3.9 (H) 08/01/2020    CRP 7.6 (H) 06/30/2019     Lab Results   Component Value Date    SEDRATE 95 (H) 09/24/2020    SEDRATE 28 (H) 06/30/2019     Radiology:      Microbiology:   Blood cultures 9/17/2020: Negative  Nares screen MRSA: Negative  C. difficile toxin assay 9/23/2020: Negative    ASSESSMENT:  · Status post cholecystectomy, right hemicolectomy, small bowel resection with ileal colonic anastomosis, splenectomy on 9/18/2020  · Antibiotic associated diarrhea.   C. difficile was negative  · Splenectomized patient  · Leukocytosis associated to the above  · Stage IV sacral decubitus ulcer, POA    PLAN:  · Patient received Hib, meningococcal polysaccharide, meningococcal group B and Prevnar 13 vaccines on 9/23/2020  · Patient will need meningococcal booster in 2 months, pneumococcal polysaccharide vaccine in 6 to 12 months  · Check final cultures  · Monitor labs    Graciela Parra  10:30 AM  9/24/2020

## 2020-09-24 NOTE — PROGRESS NOTES
Occupational Therapy     Date:2020  Patient Name: Aisha Martinez  MRN: [id number]  : 1975  Room: [address] [place], [place] [address]A       Reviewed chart and attempted to treat pt however pt declined to participate due to completing physical therapy earlier this date and just getting washed up at the sink by himself. Will attempt at later date/time.          Cornelius Meier 46, [facility]

## 2020-09-24 NOTE — CARE COORDINATION
Jocelyn Baker requested updated OT note. OT saw Pt 09/23/20. Jocelyn having trouble with Epic. SW faxed OT note to Jocelyn for pre-cert. Discharge plan is to return to [address] [place] [address] when medically stable, pending pre-cert. ALFREDO/CRISTOPHER to follow for discharge needs.    Lilia Eason, L.S.W.  [phone]

## 2020-09-24 NOTE — CARE COORDINATION
ALFREDO inquired with Ar[facility] - Kathryn Patel if she will take back pending re-cert since he is a return. Awaiting response. Discharge Plan is to return to [address] pending pre-cert. ALFREDO/CRISTOPHER following for discharge needs.    Jose Raul Liu, L.S.W.  [phone]

## 2020-09-24 NOTE — PROGRESS NOTES
Hospitalist Progress Note      PCP: IGOR Heath - CNP    Date of Admission: 9/17/2020  Days in the hospital: [address] Course:   Patient admitted on 9/17/2020 presented with abdominal pain, bleeding from rectum, and dizziness. He does admit to some shortness of breath that also started day of presentation that is associated with exertion. He denies any chest pain. Germain Rogers was recently admitted previously for anemia from GI bleed due to recently diagnosed colon mass. He has a history of bilateral pulmonary embolism and IVC filter was recently placed in the setting of blood clots with recurrent bleeding. He also has JULIO drain from being previously admitted to MICU with sepsis from gallbladder or splenic abscess.  Patient admitted to SICU for sepsis and abdominal infection. 9/18- Patient underwent ex lap with cholecystectomy, right hemicolectomy with small bowel resection and side to side ileocolonic stabled anastomosis, splenectomy, omentectomy.         Subjective  Patient seen and examined at bedside. Having diarrhea, stool for C. difficile negative. Awaiting pre-CERT for placement. Seen by ID and patient did receive Hib, meningococcal, Prevnar 13 vaccine, will need meningococcal booster in 2 months and pneumococcal vaccine in 6 to 12 months per ID. Exam:    /69   Pulse 108   Temp 96.1 °F (35.6 °C) (Temporal)   Resp 17   Ht 6' (1.829 m)   Wt 265 lb 10.5 oz (120.5 kg)   SpO2 96%   BMI 36.03 kg/m²     HEENT: + Pallor, no icterus. Respiratory:  CTA, good air entry. Cardiovascular: RRR, no murmur. Abdomen: Soft, staples noted midline  Musculoskeletal: No joint pains or joint swelling noted.    Neurologic: awake, alert and following commands        Assessment/Plan:  · Sepsis  · Abdominal infection with possible fistula  · Acute acalculous cholecystitis  · Anemia  · History of bilateral PE status post IVC filter placement  · Hypertension  · Diabetes mellitus type 2  · SIMÓN  · Hypomagnesemia  · Status post splenectomy  · Postop acute blood loss anemia     Plan:  Status post surgery, patient underwent exploratory laparotomy with cholecystectomy, right hemicolectomy and small bowel resection and side-to-side ileal colonic anastomosis, splenectomy and omentectomy, currently off antibiotics, leukocytosis improving, follow-up with surgery, tolerating diet     Status post cholecystectomy, follow-up with surgery     Status post 1 unit PRBC, monitor H&H    Leukocytosis improving    Continue to monitor diarrhea, stool for C. difficile negative     Blood pressure controlled     Monitor blood sugars closely, continue current dose of insulin     Replaced potassium     DC ECF upon discharge, awaiting pre-CERT       Labs:   Recent Labs     09/22/20  0400 09/23/20  0552 09/24/20  0500   WBC 27.9* 26.8* 23.8*   HGB 7.7* 7.5* 8.1*   HCT 23.9* 23.4* 25.3*   * 677* 748*     Recent Labs     09/22/20  0400 09/23/20  0552 09/24/20  0500    138 138   K 3.4* 3.6 3.0*   CL 98 101 103   CO2 25 24 25   BUN 7 8 7   CREATININE 0.6* 0.6* 0.6*   CALCIUM 8.6 8.3* 8.4*     Recent Labs     09/22/20  0400   AST 19   ALT 35   BILIDIR <0.2   BILITOT 0.3   ALKPHOS 165*     No results for input(s): INR in the last 72 hours. No results for input(s): Palomo Powers in the last 72 hours.     Medications:  Reviewed    Infusion Medications    lactated ringers      lactated ringers      dextrose       Scheduled Medications    pantoprazole  40 mg Oral QAM AC    acetaminophen  650 mg Oral Q6H    methocarbamol  500 mg Oral 4x Daily    insulin glargine  25 Units Subcutaneous Daily    insulin lispro  0-18 Units Subcutaneous Q4H    enoxaparin  40 mg Subcutaneous Daily    metoprolol  5 mg Intravenous Q6H    fluticasone  2 spray Nasal Daily    sodium chloride flush  10 mL Intravenous 2 times per day     PRN Meds: oxyCODONE **OR** oxyCODONE, glucose, dextrose, glucagon (rDNA), dextrose, acetaminophen **OR** acetaminophen, sodium chloride flush, [DISCONTINUED] promethazine **OR** ondansetron      Intake/Output Summary (Last 24 hours) at 9/24/2020 1341  Last data filed at 9/24/2020 0300  Gross per 24 hour   Intake 653.33 ml   Output 125 ml   Net 528.33 ml     · Body mass index is 36.03 kg/m². · Diet  · DIET LOW FIBER;  · Dietary Nutrition Supplements: Wound Healing Oral Supplement    · Code Status  · Full Code       Electronically signed by Magdy Chan MD on 9/24/2020 at 1:41 PM  [employer]   Please contact me through perfect [employer]    NOTE: This report was transcribed using voice recognition software. Every effort was made to ensure accuracy; however, inadvertent computerized transcription errors may be present.

## 2020-09-24 NOTE — PROGRESS NOTES
Physician Progress Note      PATIENT:               Wiley Dueñas  CSN #:                  [id number]  :                       1975  ADMIT DATE:       2020 2:10 PM  [address] [place] DATE:  RESPONDING  PROVIDER #:        Steph Fisher MD          QUERY TEXT:    Pt admitted with Sepsis secondary to abdominal infection . Noted   documentation of stage 4 sacral pressure ulcer by ordered Wound Care   consultant. If possible, please document in progress notes and discharge   summary:    The medical record reflects the following:  Risk Factors: Sepsis  Clinical Indicators: per Wound care consult- Impression Sacrum stage 4   pressure Patient will need continued preventative care  ? Treatment: Wound care consult, dressing changes, skin care per unit protocol    Thank you  Marina [facility]  Clinical Documentation Improvement  Options provided:  -- stage 4 sacral pressure ulcer confirmed POA  -- stage 4 sacral pressure ulcer confirmed not POA  -- Other - I will add my own diagnosis  -- Disagree - Not applicable / Not valid  -- Disagree - Clinically unable to determine / Unknown  -- Refer to Clinical Documentation Reviewer    PROVIDER RESPONSE TEXT:    The diagnosis of stage 4 sacral pressure ulcer was confirmed as POA.     Query created by: Juan Del Castillo on  7:17 AM      Electronically signed by:  Steph Fisher MD 2020 9:31 AM

## 2020-09-24 NOTE — ADT AUTH CERT
Patient Demographics     Name  Patient ID  SSN  Gender Identity  Birth Date    Gissel Escobedo  [id number]    Male  [birth year] (44 yrs)    Address  Phone  Email  Employer     [address]  [phone] (S)   [phone] (P)  --  NOT EMPLOYED   [place]   [address]  [place]  Occupation  Emp Status     [address] [employer]  --  Not Employed     Reg Status  PCP  Date Last Verified  Next Review Date     Verified  Carmen  [address] [facility]  [phone]  20     Admission Date  Discharge Date  Admitting Provider      20  --  Mayito Espinoza MD      Marital Status  Religious         Protestant       Emergency Contact 1  Emergency Contact 2  Emergency Contact 3  Emergency Contact 4    Will Aleshia Louis (C)   21  (T)   [phone] Shimon Apa)  Alexander Mcdermott 7[address] [facility])   [phone] Shimon Apa)  Santiago Grigsby [phone] [facility])  Ricardo Gustafson (3)   [phone] [facility])    Subscriber Details   Hospital Account [de-identified]   [address] Name/Sex/Relation  Subscriber   Subscriber Address/Phone  Subscriber Emp/Emp Phone    1.  Cherrie Samano   [id number]  Ricardo Arellano - Male   (Self)  1975  [address] [place] [address]  [address] [phone](T)  NOT EMPLOYED   [phone]    Utilization Reviews         Sepsis and Other Febrile Illness, without Focal Infection - Care Day 8 (2020) by Yvonne Ellis RN         Review Status  Review Entered    Completed  2020 14:15        Criteria Review       Care Day: 8 Care Date: 2020 Level of Care: ICU    Guideline Day 2    Level Of Care    (X) ICU or floor    Clinical Status    ( ) * Hemodynamic stability    2020 2:15 PM EDT by Alexander Flores      20 0753   96.1 (35.6)   17   108   116/69   96   None (Room air)     20 1300   96.8 (36)   16   120    (X) * Hypoxemia absent    (X) * Tachypnea absent    Activity    (X) Activity as tolerated    Routes    (X) Parenteral or oral medications    (X) Diet as tolerated    Interventions    (X) WBC    Medications    (X) Possible DVT prophylaxis    * Milestone    Additional Notes    Results for Christian Rick (MRN [id number]) as of 9/24/2020 14:16       9/24/2020 05:00    Sodium: 138    Potassium: 3.0 (L)    Chloride: 103    CO2: 25    BUN: 7    Creatinine: 0.6 (L)    Anion Gap: 10    GFR Non-: >60    GFR African American: >60    Glucose: 88    Calcium: 8.4 (L)    CRP: 13.4 (H)    WBC: 23.8 (H)    RBC: 2.87 (L)    Hemoglobin Quant: 8.1 (L)    Hematocrit: 25.3 (L)    MCV: 88.2    MCH: 28.2    MCHC: 32.0    MPV: 8.6    RDW: 17.2 (H)    Platelet Count: 706 (H)    Sed Rate: 95 (H)       Scheduled Meds:potassium chloride, 40 mEq, Oral, Once    pantoprazole, 40 mg, Oral, QAM AC    acetaminophen, 650 mg, Oral, Q6H    methocarbamol, 500 mg, Oral, 4x Daily    insulin glargine, 25 Units, Subcutaneous, Daily    insulin lispro, 0-18 Units, Subcutaneous, Q4H    enoxaparin, 40 mg, Subcutaneous, Daily    metoprolol, 5 mg, Intravenous, Q6H    fluticasone, 2 spray, Nasal, Daily    sodium chloride flush, 10 mL, Intravenous, 2 times per day       PRN Meds:.oxyCODONE x3, glucose, dextrose, glucagon (rDNA), dextrose, acetaminophen **OR** acetaminophen, sodium chloride flush, [DISCONTINUED] promethazine **OR** ondansetron       ID:    The patient still complaining of some abdominal pain. He still has diarrhea.     He refuses vaccinations without any problems.  Complains of soreness in arms.         Microbiology:    Blood cultures 9/17/2020: Negative    Nares screen MRSA: Negative    C. difficile toxin assay 9/23/2020: Negative         ASSESSMENT:    · Status post cholecystectomy, right hemicolectomy, small bowel resection with ileal colonic anastomosis, splenectomy on 9/18/2020    · Antibiotic associated diarrhea.  C. difficile was negative    · Splenectomized patient    · Leukocytosis associated to the above    · Stage IV sacral decubitus ulcer, POA         PLAN:    · Patient received Hib, meningococcal polysaccharide, meningococcal group B and Prevnar 13 vaccines on 9/23/2020    · Patient will need meningococcal booster in 2 months, pneumococcal polysaccharide vaccine in 6 to 12 months    · Check final cultures    · Monitor labs          IM:    Subjective    Patient seen and examined at bedside.    Having diarrhea, stool for C. difficile negative.  Awaiting pre-CERT for placement.  Seen by ID and patient did receive Hib, meningococcal, Prevnar 13 vaccine, will need meningococcal booster in 2 months and pneumococcal vaccine in 6 to 12 months per ID.         Assessment/Plan:    · Sepsis    · Abdominal infection with possible fistula    · Acute acalculous cholecystitis    · Anemia    · History of bilateral PE status post IVC filter placement    · Hypertension    · Diabetes mellitus type 2    · SIMÓN    · Hypomagnesemia    · Status post splenectomy    · Postop acute blood loss anemia         Plan:    Status post surgery, patient underwent exploratory laparotomy with cholecystectomy, right hemicolectomy and small bowel resection and side-to-side ileal colonic anastomosis, splenectomy and omentectomy, currently off antibiotics, leukocytosis improving, follow-up with surgery, tolerating diet         Status post cholecystectomy, follow-up with surgery         Status post 1 unit PRBC, monitor H&H         Leukocytosis improving         Continue to monitor diarrhea, stool for C. difficile negative         Blood pressure controlled         Monitor blood sugars closely, continue current dose of insulin         Replaced potassium         DC ECF upon discharge, awaiting pre-CERT                Sepsis and Other Febrile Illness, without Focal Infection - Care Day 7 (9/23/2020) by Kathi Palacios RN         Review Status  Review Entered    Completed  9/24/2020 11:12        Criteria Review       Care Day: 7 Care Date: 9/23/2020 Level of Care: ICU    Guideline Day 2    Level Of cholecystitis    · Anemia    · History of bilateral PE status post IVC filter placement    · Hypertension    · Diabetes mellitus type 2    · SIMÓN    · Hypomagnesemia    · Status post splenectomy    · Postop acute blood loss anemia         Plan:    Status post surgery, patient underwent exploratory laparotomy with cholecystectomy, right hemicolectomy and small bowel resection and side-to-side ileal colonic anastomosis, splenectomy and omentectomy, currently off antibiotics, leukocytosis improving, follow-up with surgery, tolerating diet         Status post cholecystectomy, follow-up with surgery         Hemoglobin trending down, will transfuse 1 unit of PRBC, patient agreeable         Blood pressure control         Monitor blood sugars closely, continue current dose of insulin         Replaced potassium         DC ECF upon discharge            GenSurg:    Assessment    Active Problems:      SIMÓN (acute kidney injury) ([address]      Septicemia ([address]      Intra-abdominal infection         Plan    GI: post op.  General diet     midline incision c/d/i    Increase activity    Consult ID for post splenectomy vaccines

## 2020-09-25 LAB
ANION GAP SERPL CALCULATED.3IONS-SCNC: 14 MMOL/L (ref 7–16)
BUN BLDV-MCNC: 7 MG/DL (ref 6–20)
CALCIUM SERPL-MCNC: 8.5 MG/DL (ref 8.6–10.2)
CHLORIDE BLD-SCNC: 103 MMOL/L (ref 98–107)
CO2: 24 MMOL/L (ref 22–29)
CREAT SERPL-MCNC: 0.6 MG/DL (ref 0.7–1.2)
GFR AFRICAN AMERICAN: >60
GFR NON-AFRICAN AMERICAN: >60 ML/MIN/1.73
GLUCOSE BLD-MCNC: 102 MG/DL (ref 74–99)
HCT VFR BLD CALC: 24.7 % (ref 37–54)
HEMOGLOBIN: 8 G/DL (ref 12.5–16.5)
MAGNESIUM: 1.7 MG/DL (ref 1.6–2.6)
MCH RBC QN AUTO: 28.3 PG (ref 26–35)
MCHC RBC AUTO-ENTMCNC: 32.4 % (ref 32–34.5)
MCV RBC AUTO: 87.3 FL (ref 80–99.9)
METER GLUCOSE: 102 MG/DL (ref 74–99)
METER GLUCOSE: 112 MG/DL (ref 74–99)
METER GLUCOSE: 129 MG/DL (ref 74–99)
METER GLUCOSE: 185 MG/DL (ref 74–99)
METER GLUCOSE: 87 MG/DL (ref 74–99)
METER GLUCOSE: 91 MG/DL (ref 74–99)
PDW BLD-RTO: 17.3 FL (ref 11.5–15)
PLATELET # BLD: 872 E9/L (ref 130–450)
PMV BLD AUTO: 8.5 FL (ref 7–12)
POTASSIUM SERPL-SCNC: 3.3 MMOL/L (ref 3.5–5)
RBC # BLD: 2.83 E12/L (ref 3.8–5.8)
SODIUM BLD-SCNC: 141 MMOL/L (ref 132–146)
WBC # BLD: 22.4 E9/L (ref 4.5–11.5)

## 2020-09-25 PROCEDURE — 6370000000 HC RX 637 (ALT 250 FOR IP): Performed by: SURGERY

## 2020-09-25 PROCEDURE — 2580000003 HC RX 258: Performed by: STUDENT IN AN ORGANIZED HEALTH CARE EDUCATION/TRAINING PROGRAM

## 2020-09-25 PROCEDURE — 6370000000 HC RX 637 (ALT 250 FOR IP): Performed by: STUDENT IN AN ORGANIZED HEALTH CARE EDUCATION/TRAINING PROGRAM

## 2020-09-25 PROCEDURE — 80048 BASIC METABOLIC PNL TOTAL CA: CPT

## 2020-09-25 PROCEDURE — 2500000003 HC RX 250 WO HCPCS: Performed by: STUDENT IN AN ORGANIZED HEALTH CARE EDUCATION/TRAINING PROGRAM

## 2020-09-25 PROCEDURE — 6370000000 HC RX 637 (ALT 250 FOR IP): Performed by: INTERNAL MEDICINE

## 2020-09-25 PROCEDURE — 6360000002 HC RX W HCPCS: Performed by: STUDENT IN AN ORGANIZED HEALTH CARE EDUCATION/TRAINING PROGRAM

## 2020-09-25 PROCEDURE — 2060000000 HC ICU INTERMEDIATE R&B

## 2020-09-25 PROCEDURE — 82962 GLUCOSE BLOOD TEST: CPT

## 2020-09-25 PROCEDURE — 83735 ASSAY OF MAGNESIUM: CPT

## 2020-09-25 PROCEDURE — 85027 COMPLETE CBC AUTOMATED: CPT

## 2020-09-25 RX ORDER — LOPERAMIDE HYDROCHLORIDE 2 MG/1
2 CAPSULE ORAL 4 TIMES DAILY PRN
Status: DISCONTINUED | OUTPATIENT
Start: 2020-09-25 | End: 2020-09-26 | Stop reason: HOSPADM

## 2020-09-25 RX ORDER — POTASSIUM CHLORIDE 20 MEQ/1
40 TABLET, EXTENDED RELEASE ORAL ONCE
Status: COMPLETED | OUTPATIENT
Start: 2020-09-25 | End: 2020-09-25

## 2020-09-25 RX ORDER — LOPERAMIDE HYDROCHLORIDE 2 MG/1
4 CAPSULE ORAL ONCE
Status: COMPLETED | OUTPATIENT
Start: 2020-09-25 | End: 2020-09-25

## 2020-09-25 RX ORDER — POTASSIUM CHLORIDE 20 MEQ/1
40 TABLET, EXTENDED RELEASE ORAL ONCE
Status: DISCONTINUED | OUTPATIENT
Start: 2020-09-25 | End: 2020-09-26 | Stop reason: HOSPADM

## 2020-09-25 RX ADMIN — OXYCODONE HYDROCHLORIDE 5 MG: 5 TABLET ORAL at 21:14

## 2020-09-25 RX ADMIN — METHOCARBAMOL TABLETS 500 MG: 500 TABLET, COATED ORAL at 20:09

## 2020-09-25 RX ADMIN — LOPERAMIDE HYDROCHLORIDE 4 MG: 2 CAPSULE ORAL at 11:43

## 2020-09-25 RX ADMIN — OXYCODONE HYDROCHLORIDE 10 MG: 10 TABLET ORAL at 04:12

## 2020-09-25 RX ADMIN — ACETAMINOPHEN 650 MG: 325 TABLET, FILM COATED ORAL at 17:07

## 2020-09-25 RX ADMIN — PANTOPRAZOLE SODIUM 40 MG: 40 TABLET, DELAYED RELEASE ORAL at 06:49

## 2020-09-25 RX ADMIN — ENOXAPARIN SODIUM 40 MG: 40 INJECTION SUBCUTANEOUS at 08:51

## 2020-09-25 RX ADMIN — Medication 10 ML: at 20:35

## 2020-09-25 RX ADMIN — OXYCODONE HYDROCHLORIDE 10 MG: 10 TABLET ORAL at 17:17

## 2020-09-25 RX ADMIN — INSULIN LISPRO 3 UNITS: 100 INJECTION, SOLUTION INTRAVENOUS; SUBCUTANEOUS at 20:09

## 2020-09-25 RX ADMIN — METOPROLOL TARTRATE 5 MG: 5 INJECTION INTRAVENOUS at 20:09

## 2020-09-25 RX ADMIN — METHOCARBAMOL TABLETS 500 MG: 500 TABLET, COATED ORAL at 13:13

## 2020-09-25 RX ADMIN — ACETAMINOPHEN 650 MG: 325 TABLET, FILM COATED ORAL at 00:03

## 2020-09-25 RX ADMIN — SODIUM CHLORIDE, PRESERVATIVE FREE 10 ML: 5 INJECTION INTRAVENOUS at 03:04

## 2020-09-25 RX ADMIN — METHOCARBAMOL TABLETS 500 MG: 500 TABLET, COATED ORAL at 17:07

## 2020-09-25 RX ADMIN — INSULIN GLARGINE 25 UNITS: 100 INJECTION, SOLUTION SUBCUTANEOUS at 06:49

## 2020-09-25 RX ADMIN — METOPROLOL TARTRATE 5 MG: 5 INJECTION INTRAVENOUS at 14:19

## 2020-09-25 RX ADMIN — ACETAMINOPHEN 650 MG: 325 TABLET, FILM COATED ORAL at 06:49

## 2020-09-25 RX ADMIN — LOPERAMIDE HYDROCHLORIDE 2 MG: 2 CAPSULE ORAL at 21:14

## 2020-09-25 RX ADMIN — ACETAMINOPHEN 650 MG: 325 TABLET, FILM COATED ORAL at 11:43

## 2020-09-25 RX ADMIN — OXYCODONE HYDROCHLORIDE 5 MG: 5 TABLET ORAL at 00:03

## 2020-09-25 RX ADMIN — POTASSIUM CHLORIDE 40 MEQ: 1500 TABLET, EXTENDED RELEASE ORAL at 09:52

## 2020-09-25 RX ADMIN — OXYCODONE HYDROCHLORIDE 10 MG: 10 TABLET ORAL at 08:50

## 2020-09-25 RX ADMIN — METHOCARBAMOL TABLETS 500 MG: 500 TABLET, COATED ORAL at 08:50

## 2020-09-25 RX ADMIN — SODIUM CHLORIDE, PRESERVATIVE FREE 10 ML: 5 INJECTION INTRAVENOUS at 14:19

## 2020-09-25 RX ADMIN — Medication 10 ML: at 08:51

## 2020-09-25 RX ADMIN — OXYCODONE HYDROCHLORIDE 10 MG: 10 TABLET ORAL at 13:13

## 2020-09-25 RX ADMIN — METOPROLOL TARTRATE 5 MG: 5 INJECTION INTRAVENOUS at 03:03

## 2020-09-25 RX ADMIN — METOPROLOL TARTRATE 5 MG: 5 INJECTION INTRAVENOUS at 08:51

## 2020-09-25 ASSESSMENT — PAIN SCALES - GENERAL
PAINLEVEL_OUTOF10: 9
PAINLEVEL_OUTOF10: 10
PAINLEVEL_OUTOF10: 0
PAINLEVEL_OUTOF10: 9

## 2020-09-25 ASSESSMENT — PAIN DESCRIPTION - PAIN TYPE: TYPE: ACUTE PAIN;SURGICAL PAIN

## 2020-09-25 ASSESSMENT — PAIN DESCRIPTION - FREQUENCY: FREQUENCY: CONTINUOUS

## 2020-09-25 ASSESSMENT — PAIN DESCRIPTION - DESCRIPTORS: DESCRIPTORS: ACHING;DISCOMFORT;NAGGING

## 2020-09-25 ASSESSMENT — PAIN DESCRIPTION - LOCATION: LOCATION: ABDOMEN;BUTTOCKS

## 2020-09-25 ASSESSMENT — PAIN DESCRIPTION - PROGRESSION: CLINICAL_PROGRESSION: NOT CHANGED

## 2020-09-25 ASSESSMENT — PAIN DESCRIPTION - ONSET: ONSET: ON-GOING

## 2020-09-25 ASSESSMENT — PAIN - FUNCTIONAL ASSESSMENT: PAIN_FUNCTIONAL_ASSESSMENT: ACTIVITIES ARE NOT PREVENTED

## 2020-09-25 NOTE — PROGRESS NOTES
[address] [place] [address] [facility]  Progress Note    SUBJECTIVE:  Chief Complaint   Patient presents with    Dizziness     from NH, dizziness today, have a JULIO drain that is draining his gallbladder. The patient still has some abdominal pain. He still has loose stools every 2 hours. No nausea or vomiting. Review of systems:  As stated above in the chief complaint, otherwise negative. Medications:  Scheduled Meds:   pantoprazole  40 mg Oral QAM AC    acetaminophen  650 mg Oral Q6H    methocarbamol  500 mg Oral 4x Daily    insulin glargine  25 Units Subcutaneous Daily    insulin lispro  0-18 Units Subcutaneous Q4H    enoxaparin  40 mg Subcutaneous Daily    metoprolol  5 mg Intravenous Q6H    fluticasone  2 spray Nasal Daily    sodium chloride flush  10 mL Intravenous 2 times per day     Continuous Infusions:   lactated ringers      lactated ringers      dextrose       PRN Meds:oxyCODONE **OR** oxyCODONE, glucose, dextrose, glucagon (rDNA), dextrose, acetaminophen **OR** acetaminophen, sodium chloride flush, [DISCONTINUED] promethazine **OR** ondansetron    OBJECTIVE:  BP (!) 108/58   Pulse 116   Temp 98.1 °F (36.7 °C) (Temporal)   Resp 16   Ht 6' (1.829 m)   Wt 265 lb 10.5 oz (120.5 kg)   SpO2 97%   BMI 36.03 kg/m²   Temp  Av.5 °F (36.4 °C)  Min: 96.8 °F (36 °C)  Max: 98.1 °F (36.7 °C)  Constitutional: The patient is awake, alert, and oriented. Lying in bed. No distress. Skin: Warm and dry. No rashes were noted. HEENT: Round and reactive pupils. Moist mucous membranes. No ulcerations or thrush. Neck: Supple to movements. Chest: Good breath sounds. No crackles. Cardiovascular: Heart sounds rhythmic and regular. Abdomen: Positive bowel sounds to auscultation. Midline incision. Tender to palpation. Extremities: No edema.   Lines: peripheral    Laboratory and Tests Review:  Lab Results   Component Value Date    WBC 22.4 (H) 2020    WBC 23.8 (H) 09/24/2020    WBC 26.8 (H) 09/23/2020    HGB 8.0 (L) 09/25/2020    HCT 24.7 (L) 09/25/2020    MCV 87.3 09/25/2020     (H) 09/25/2020     Lab Results   Component Value Date    NEUTROABS 20.55 (H) 09/18/2020    NEUTROABS 13.66 (H) 09/17/2020    NEUTROABS 4.23 09/03/2020     No results found for: [facility]  Lab Results   Component Value Date    ALT 35 09/22/2020    AST 19 09/22/2020    ALKPHOS 165 (H) 09/22/2020    BILITOT 0.3 09/22/2020     Lab Results   Component Value Date     09/25/2020    K 3.3 09/25/2020    K 4.5 09/17/2020     09/25/2020    CO2 24 09/25/2020    BUN 7 09/25/2020    CREATININE 0.6 09/25/2020    CREATININE 0.6 09/24/2020    CREATININE 0.6 09/23/2020    GFRAA >60 09/25/2020    LABGLOM >60 09/25/2020    GLUCOSE 102 09/25/2020    GLUCOSE 125 05/11/2012    PROT 5.2 09/22/2020    LABALBU 2.2 09/22/2020    LABALBU 4.8 05/11/2012    CALCIUM 8.5 09/25/2020    BILITOT 0.3 09/22/2020    ALKPHOS 165 09/22/2020    AST 19 09/22/2020    ALT 35 09/22/2020     Lab Results   Component Value Date    CRP 13.4 (H) 09/24/2020    CRP 3.9 (H) 08/01/2020    CRP 7.6 (H) 06/30/2019     Lab Results   Component Value Date    SEDRATE 95 (H) 09/24/2020    SEDRATE 28 (H) 06/30/2019     Radiology:      Microbiology:   Blood cultures 9/17/2020: Negative  Nares screen MRSA: Negative  C. difficile toxin assay 9/23/2020: Negative    ASSESSMENT:  · Status post cholecystectomy, right hemicolectomy, small bowel resection with ileal colonic anastomosis, splenectomy on 9/18/2020  · Antibiotic associated diarrhea.   C. difficile was negative  · Splenectomized patient  · Leukocytosis associated to splenectomy  · Stage IV sacral decubitus ulcer, POA    PLAN:  · Patient received Hib, meningococcal polysaccharide, meningococcal group B and Prevnar 13 vaccines on 9/23/2020  · Patient will need meningococcal booster in 2 months, pneumococcal polysaccharide vaccine in 6 to 12 months  · The patient can be discharged from ID standpoint    Derrick Roland  11:11 AM  9/25/2020

## 2020-09-25 NOTE — PROGRESS NOTES
Hospitalist Progress Note      PCP: IGOR Albrecht - CNP    Date of Admission: 9/17/2020  Days in the hospital: [facility] 22 Course:   Patient admitted on 9/17/2020 presented with abdominal pain, bleeding from rectum, and dizziness. He does admit to some shortness of breath that also started day of presentation that is associated with exertion. He denies any chest pain. Kulwinder Angel was recently admitted previously for anemia from GI bleed due to recently diagnosed colon mass. He has a history of bilateral pulmonary embolism and IVC filter was recently placed in the setting of blood clots with recurrent bleeding. He also has JULIO drain from being previously admitted to MICU with sepsis from gallbladder or splenic abscess.  Patient admitted to SICU for sepsis and abdominal infection. 9/18- Patient underwent ex lap with cholecystectomy, right hemicolectomy with small bowel resection and side to side ileocolonic stabled anastomosis, splenectomy, omentectomy.  He was placed on empiric antibiotics which were discontinued on the 20th. He still has leukocytosis but is improving. He was seen by ID. He developed diarrhea which was thought to be secondary to antibiotic related, stool for C. difficile has been negative. Subjective  Patient seen and examined at bedside. He is having profuse diarrhea today. Denies any abdominal pain denies any nausea or vomiting. He has pre-CERT but patient not able to go today due to severe diarrhea. Exam:    BP (!) 117/57   Pulse 124   Temp 98.1 °F (36.7 °C) (Temporal)   Resp 16   Ht 6' (1.829 m)   Wt 265 lb 10.5 oz (120.5 kg)   SpO2 97%   BMI 36.03 kg/m²     HEENT: + Pallor, no icterus. Respiratory:  CTA, good air entry. Cardiovascular: RRR, no murmur. Abdomen: Soft, abdominal staples noted  Musculoskeletal: No joint pains or joint swelling noted.    Neurologic: awake, alert and following commands       Assessment/Plan:  Active Hospital Problems    Diagnosis Date Noted  SIMÓN (acute kidney injury) ([facility] [N17.9] 09/17/2020    Septicemia ([facility] [A41.9] 09/17/2020    Intra-abdominal infection [B99.9] 09/17/2020     · Acute acalculous cholecystitis  · Anemia  · History of bilateral PE status post IVC filter placement  · Hypertension  · Diabetes mellitus type 2  · SIMÓN  · Hypomagnesemia  · Status post splenectomy  · Postop acute blood loss anemia  · Profuse diarrhea  · Leukocytosis  · Hypokalemia    Plan:  · Stool for C. difficile is negative, will try Imodium and monitor him, seen by ID, no further work-up at this time. · Follow-up with surgery, tolerating diet  · Status post 1 unit of PRBC, H&H remained stable  · Leukocytosis improving  · Replace potassium  · Continue rest of his medication  · Plan is for him to go back to facility, appreciate available, continue to monitor overnight      Labs:   Recent Labs     09/23/20  0552 09/24/20  0500 09/25/20  0521   WBC 26.8* 23.8* 22.4*   HGB 7.5* 8.1* 8.0*   HCT 23.4* 25.3* 24.7*   * 748* 872*     Recent Labs     09/23/20  0552 09/24/20  0500 09/25/20  0521    138 141   K 3.6 3.0* 3.3*    103 103   CO2 24 25 24   BUN 8 7 7   CREATININE 0.6* 0.6* 0.6*   CALCIUM 8.3* 8.4* 8.5*     No results for input(s): AST, ALT, BILIDIR, BILITOT, ALKPHOS in the last 72 hours. No results for input(s): INR in the last 72 hours. No results for input(s): Burnadette Lundborg in the last 72 hours.     Medications:  Reviewed    Infusion Medications    lactated ringers      lactated ringers      dextrose       Scheduled Medications    potassium chloride  40 mEq Oral Once    pantoprazole  40 mg Oral QAM AC    acetaminophen  650 mg Oral Q6H    methocarbamol  500 mg Oral 4x Daily    insulin glargine  25 Units Subcutaneous Daily    insulin lispro  0-18 Units Subcutaneous Q4H    enoxaparin  40 mg Subcutaneous Daily    metoprolol  5 mg Intravenous Q6H    fluticasone  2 spray Nasal Daily    sodium chloride flush  10 mL Intravenous 2

## 2020-09-25 NOTE — PROGRESS NOTES
Nutrition Monitoring and Evaluation:   Food/Nutrient Intake Outcomes:  Food and Nutrient Intake, Supplement Intake  Physical Signs/Symptoms Outcomes:  Biochemical Data, Chewing or Swallowing, GI Status, Fluid Status or Edema, Hemodynamic Status, Nutrition Focused Physical Findings, Skin, Weight     Discharge Planning:     Too soon to determine     Electronically signed by Nata Gale RD, LD on 9/25/20 at 10:09 AM EDT    Contact: x [phone]

## 2020-09-25 NOTE — CARE COORDINATION
Jag - Chad Carrasquillo informed SW that pre-cert was approved for the next 48 hours. ALFREDO informed Charge Nurse that Pre-cert was approved for the next 48 hours and requested a discharge order. Discharge Plan is to return to [address] [place] [address] when medically stable, pre-cert approved for next 48 hours.    Patrick May, L.S.W.  [phone]

## 2020-09-26 VITALS
HEART RATE: 104 BPM | DIASTOLIC BLOOD PRESSURE: 60 MMHG | RESPIRATION RATE: 14 BRPM | WEIGHT: 265.65 LBS | SYSTOLIC BLOOD PRESSURE: 100 MMHG | TEMPERATURE: 98.1 F | BODY MASS INDEX: 35.98 KG/M2 | HEIGHT: 72 IN | OXYGEN SATURATION: 93 %

## 2020-09-26 LAB
ANION GAP SERPL CALCULATED.3IONS-SCNC: 13 MMOL/L (ref 7–16)
BUN BLDV-MCNC: 8 MG/DL (ref 6–20)
CALCIUM SERPL-MCNC: 8.4 MG/DL (ref 8.6–10.2)
CHLORIDE BLD-SCNC: 104 MMOL/L (ref 98–107)
CO2: 23 MMOL/L (ref 22–29)
CREAT SERPL-MCNC: 0.7 MG/DL (ref 0.7–1.2)
GFR AFRICAN AMERICAN: >60
GFR NON-AFRICAN AMERICAN: >60 ML/MIN/1.73
GLUCOSE BLD-MCNC: 137 MG/DL (ref 74–99)
HCT VFR BLD CALC: 24.7 % (ref 37–54)
HEMOGLOBIN: 8.1 G/DL (ref 12.5–16.5)
MAGNESIUM: 1.4 MG/DL (ref 1.6–2.6)
MCH RBC QN AUTO: 28.4 PG (ref 26–35)
MCHC RBC AUTO-ENTMCNC: 32.8 % (ref 32–34.5)
MCV RBC AUTO: 86.7 FL (ref 80–99.9)
METER GLUCOSE: 118 MG/DL (ref 74–99)
METER GLUCOSE: 78 MG/DL (ref 74–99)
METER GLUCOSE: 83 MG/DL (ref 74–99)
METER GLUCOSE: 90 MG/DL (ref 74–99)
METER GLUCOSE: 98 MG/DL (ref 74–99)
PDW BLD-RTO: 17.2 FL (ref 11.5–15)
PLATELET # BLD: 850 E9/L (ref 130–450)
PMV BLD AUTO: 8.2 FL (ref 7–12)
POTASSIUM SERPL-SCNC: 3.9 MMOL/L (ref 3.5–5)
RBC # BLD: 2.85 E12/L (ref 3.8–5.8)
SODIUM BLD-SCNC: 140 MMOL/L (ref 132–146)
WBC # BLD: 22.4 E9/L (ref 4.5–11.5)

## 2020-09-26 PROCEDURE — 83735 ASSAY OF MAGNESIUM: CPT

## 2020-09-26 PROCEDURE — 6370000000 HC RX 637 (ALT 250 FOR IP): Performed by: SURGERY

## 2020-09-26 PROCEDURE — 2500000003 HC RX 250 WO HCPCS: Performed by: STUDENT IN AN ORGANIZED HEALTH CARE EDUCATION/TRAINING PROGRAM

## 2020-09-26 PROCEDURE — 6370000000 HC RX 637 (ALT 250 FOR IP): Performed by: SPECIALIST

## 2020-09-26 PROCEDURE — 6370000000 HC RX 637 (ALT 250 FOR IP): Performed by: STUDENT IN AN ORGANIZED HEALTH CARE EDUCATION/TRAINING PROGRAM

## 2020-09-26 PROCEDURE — 2580000003 HC RX 258: Performed by: STUDENT IN AN ORGANIZED HEALTH CARE EDUCATION/TRAINING PROGRAM

## 2020-09-26 PROCEDURE — 6360000002 HC RX W HCPCS

## 2020-09-26 PROCEDURE — 80048 BASIC METABOLIC PNL TOTAL CA: CPT

## 2020-09-26 PROCEDURE — 6370000000 HC RX 637 (ALT 250 FOR IP): Performed by: INTERNAL MEDICINE

## 2020-09-26 PROCEDURE — 6360000002 HC RX W HCPCS: Performed by: INTERNAL MEDICINE

## 2020-09-26 PROCEDURE — 36415 COLL VENOUS BLD VENIPUNCTURE: CPT

## 2020-09-26 PROCEDURE — 6360000002 HC RX W HCPCS: Performed by: STUDENT IN AN ORGANIZED HEALTH CARE EDUCATION/TRAINING PROGRAM

## 2020-09-26 PROCEDURE — 82962 GLUCOSE BLOOD TEST: CPT

## 2020-09-26 PROCEDURE — 85027 COMPLETE CBC AUTOMATED: CPT

## 2020-09-26 RX ORDER — CHOLESTYRAMINE 4 G/9G
1 POWDER, FOR SUSPENSION ORAL 2 TIMES DAILY
Qty: 90 PACKET | Refills: 3 | Status: SHIPPED | OUTPATIENT
Start: 2020-09-26 | End: 2021-08-31

## 2020-09-26 RX ORDER — MAGNESIUM SULFATE IN WATER 40 MG/ML
2 INJECTION, SOLUTION INTRAVENOUS ONCE
Status: COMPLETED | OUTPATIENT
Start: 2020-09-26 | End: 2020-09-26

## 2020-09-26 RX ORDER — POTASSIUM CHLORIDE 20 MEQ/1
40 TABLET, EXTENDED RELEASE ORAL ONCE
Qty: 60 TABLET | Refills: 3 | Status: SHIPPED | OUTPATIENT
Start: 2020-09-26 | End: 2020-10-07

## 2020-09-26 RX ORDER — CHOLESTYRAMINE 4 G/9G
1 POWDER, FOR SUSPENSION ORAL 2 TIMES DAILY
Status: DISCONTINUED | OUTPATIENT
Start: 2020-09-26 | End: 2020-09-26 | Stop reason: HOSPADM

## 2020-09-26 RX ORDER — HEPARIN SODIUM (PORCINE) LOCK FLUSH IV SOLN 100 UNIT/ML 100 UNIT/ML
SOLUTION INTRAVENOUS
Status: COMPLETED
Start: 2020-09-26 | End: 2020-09-26

## 2020-09-26 RX ORDER — INSULIN GLARGINE 100 [IU]/ML
25 INJECTION, SOLUTION SUBCUTANEOUS DAILY
Qty: 1 VIAL | Refills: 3 | Status: SHIPPED | OUTPATIENT
Start: 2020-09-27 | End: 2021-05-17 | Stop reason: SDUPTHER

## 2020-09-26 RX ORDER — NICOTINE POLACRILEX 4 MG
15 LOZENGE BUCCAL PRN
Qty: 45 G | Refills: 1 | Status: SHIPPED | OUTPATIENT
Start: 2020-09-26 | End: 2021-08-31

## 2020-09-26 RX ADMIN — ACETAMINOPHEN 650 MG: 325 TABLET, FILM COATED ORAL at 13:35

## 2020-09-26 RX ADMIN — OXYCODONE HYDROCHLORIDE 10 MG: 10 TABLET ORAL at 01:30

## 2020-09-26 RX ADMIN — ENOXAPARIN SODIUM 40 MG: 40 INJECTION SUBCUTANEOUS at 09:44

## 2020-09-26 RX ADMIN — OXYCODONE HYDROCHLORIDE 10 MG: 10 TABLET ORAL at 13:38

## 2020-09-26 RX ADMIN — METHOCARBAMOL TABLETS 500 MG: 500 TABLET, COATED ORAL at 13:35

## 2020-09-26 RX ADMIN — ACETAMINOPHEN 650 MG: 325 TABLET, FILM COATED ORAL at 00:07

## 2020-09-26 RX ADMIN — Medication 500 UNITS: at 05:19

## 2020-09-26 RX ADMIN — METHOCARBAMOL TABLETS 500 MG: 500 TABLET, COATED ORAL at 09:51

## 2020-09-26 RX ADMIN — METHOCARBAMOL TABLETS 500 MG: 500 TABLET, COATED ORAL at 17:00

## 2020-09-26 RX ADMIN — Medication 10 ML: at 09:48

## 2020-09-26 RX ADMIN — METOPROLOL TARTRATE 5 MG: 5 INJECTION INTRAVENOUS at 09:43

## 2020-09-26 RX ADMIN — CHOLESTYRAMINE 4 G: 4 POWDER, FOR SUSPENSION ORAL at 13:35

## 2020-09-26 RX ADMIN — LOPERAMIDE HYDROCHLORIDE 2 MG: 2 CAPSULE ORAL at 06:35

## 2020-09-26 RX ADMIN — OXYCODONE HYDROCHLORIDE 10 MG: 10 TABLET ORAL at 17:35

## 2020-09-26 RX ADMIN — SODIUM CHLORIDE, PRESERVATIVE FREE 10 ML: 5 INJECTION INTRAVENOUS at 17:00

## 2020-09-26 RX ADMIN — OXYCODONE HYDROCHLORIDE 10 MG: 10 TABLET ORAL at 09:43

## 2020-09-26 RX ADMIN — MAGNESIUM SULFATE 2 G: 2 INJECTION INTRAVENOUS at 15:46

## 2020-09-26 RX ADMIN — METOPROLOL TARTRATE 5 MG: 5 INJECTION INTRAVENOUS at 17:00

## 2020-09-26 RX ADMIN — INSULIN GLARGINE 25 UNITS: 100 INJECTION, SOLUTION SUBCUTANEOUS at 06:35

## 2020-09-26 RX ADMIN — PANTOPRAZOLE SODIUM 40 MG: 40 TABLET, DELAYED RELEASE ORAL at 06:34

## 2020-09-26 RX ADMIN — ACETAMINOPHEN 650 MG: 325 TABLET, FILM COATED ORAL at 06:34

## 2020-09-26 RX ADMIN — METOPROLOL TARTRATE 5 MG: 5 INJECTION INTRAVENOUS at 03:00

## 2020-09-26 RX ADMIN — ACETAMINOPHEN 650 MG: 325 TABLET, FILM COATED ORAL at 17:00

## 2020-09-26 RX ADMIN — OXYCODONE HYDROCHLORIDE 10 MG: 10 TABLET ORAL at 05:19

## 2020-09-26 RX ADMIN — LOPERAMIDE HYDROCHLORIDE 2 MG: 2 CAPSULE ORAL at 09:43

## 2020-09-26 ASSESSMENT — PAIN SCALES - GENERAL
PAINLEVEL_OUTOF10: 10
PAINLEVEL_OUTOF10: 9
PAINLEVEL_OUTOF10: 9
PAINLEVEL_OUTOF10: 10
PAINLEVEL_OUTOF10: 9
PAINLEVEL_OUTOF10: 10
PAINLEVEL_OUTOF10: 9
PAINLEVEL_OUTOF10: 9
PAINLEVEL_OUTOF10: 7
PAINLEVEL_OUTOF10: 10
PAINLEVEL_OUTOF10: 9

## 2020-09-26 NOTE — PROGRESS NOTES
[address] [place] [address] [facility]  Progress Note    SUBJECTIVE:  Chief Complaint   Patient presents with    Dizziness     from NH, dizziness today, have a JULIO drain that is draining his gallbladder. The patient is still having some loose stools in spite of 3 doses of Imodium so far. He still has some abdominal pain. No nausea or vomiting. No fever. Review of systems:  As stated above in the chief complaint, otherwise negative. Medications:  Scheduled Meds:   potassium chloride  40 mEq Oral Once    pantoprazole  40 mg Oral QAM AC    acetaminophen  650 mg Oral Q6H    methocarbamol  500 mg Oral 4x Daily    insulin glargine  25 Units Subcutaneous Daily    insulin lispro  0-18 Units Subcutaneous Q4H    enoxaparin  40 mg Subcutaneous Daily    metoprolol  5 mg Intravenous Q6H    fluticasone  2 spray Nasal Daily    sodium chloride flush  10 mL Intravenous 2 times per day     Continuous Infusions:   lactated ringers      lactated ringers      dextrose       PRN Meds:loperamide, oxyCODONE **OR** oxyCODONE, glucose, dextrose, glucagon (rDNA), dextrose, acetaminophen **OR** acetaminophen, sodium chloride flush, [DISCONTINUED] promethazine **OR** ondansetron    OBJECTIVE:  /73   Pulse 109   Temp 99.8 °F (37.7 °C) (Oral)   Resp 20   Ht 6' (1.829 m)   Wt 265 lb 10.5 oz (120.5 kg)   SpO2 95%   BMI 36.03 kg/m²   Temp  Av.9 °F (37.2 °C)  Min: 98.3 °F (36.8 °C)  Max: 99.8 °F (37.7 °C)  Constitutional: The patient is awake, alert, and oriented. Lying in bed. No distress. Skin: Warm and dry. No rashes were noted. HEENT: Round and reactive pupils. Moist mucous membranes. No ulcerations or thrush. Neck: Supple to movements. Chest: Good breath sounds. No crackles. Cardiovascular: Heart sounds rhythmic and regular. Abdomen: Positive bowel sounds to auscultation. Midline incision. Tender to palpation. Extremities: No edema.   Lines: Right IJ TLC    Laboratory and Tests Review:  Lab Results   Component Value Date    WBC 22.4 (H) 09/26/2020    WBC 22.4 (H) 09/25/2020    WBC 23.8 (H) 09/24/2020    HGB 8.1 (L) 09/26/2020    HCT 24.7 (L) 09/26/2020    MCV 86.7 09/26/2020     (H) 09/26/2020     Lab Results   Component Value Date    NEUTROABS 20.55 (H) 09/18/2020    NEUTROABS 13.66 (H) 09/17/2020    NEUTROABS 4.23 09/03/2020     No results found for: [facility]  Lab Results   Component Value Date    ALT 35 09/22/2020    AST 19 09/22/2020    ALKPHOS 165 (H) 09/22/2020    BILITOT 0.3 09/22/2020     Lab Results   Component Value Date     09/26/2020    K 3.9 09/26/2020    K 4.5 09/17/2020     09/26/2020    CO2 23 09/26/2020    BUN 8 09/26/2020    CREATININE 0.7 09/26/2020    CREATININE 0.6 09/25/2020    CREATININE 0.6 09/24/2020    GFRAA >60 09/26/2020    LABGLOM >60 09/26/2020    GLUCOSE 137 09/26/2020    GLUCOSE 125 05/11/2012    PROT 5.2 09/22/2020    LABALBU 2.2 09/22/2020    LABALBU 4.8 05/11/2012    CALCIUM 8.4 09/26/2020    BILITOT 0.3 09/22/2020    ALKPHOS 165 09/22/2020    AST 19 09/22/2020    ALT 35 09/22/2020     Lab Results   Component Value Date    CRP 13.4 (H) 09/24/2020    CRP 3.9 (H) 08/01/2020    CRP 7.6 (H) 06/30/2019     Lab Results   Component Value Date    SEDRATE 95 (H) 09/24/2020    SEDRATE 28 (H) 06/30/2019     Radiology:      Microbiology:   Blood cultures 9/17/2020: Negative  Nares screen MRSA: Negative  C. difficile toxin assay 9/23/2020: Negative    ASSESSMENT:  · Status post cholecystectomy, right hemicolectomy, small bowel resection with ileal colonic anastomosis, splenectomy on 9/18/2020  · Antibiotic associated diarrhea.   C. difficile was negative  · Splenectomized patient  · Leukocytosis associated to splenectomy  · Stage IV sacral decubitus ulcer, POA    PLAN:  · Patient received Hib, meningococcal polysaccharide, meningococcal group B and Prevnar 13 vaccines on 9/23/2020  · Patient will need meningococcal booster in 2 months, pneumococcal polysaccharide vaccine in 6 to 12 months  · Remove TLC  · Continue Imodium.   · Add cholestyramine    Patrick Mcdonnell  10:03 AM  9/26/2020

## 2020-09-26 NOTE — PLAN OF CARE
Problem: Falls - Risk of:  Goal: Will remain free from falls  Description: Will remain free from falls  Outcome: Completed  Goal: Absence of physical injury  Description: Absence of physical injury  Outcome: Completed     Problem: Skin Integrity:  Goal: Will show no infection signs and symptoms  Description: Will show no infection signs and symptoms  Outcome: Completed  Goal: Absence of new skin breakdown  Description: Absence of new skin breakdown  Outcome: Completed     Problem: Pain:  Goal: Pain level will decrease  Description: Pain level will decrease  Outcome: Completed  Goal: Control of acute pain  Description: Control of acute pain  Outcome: Completed  Goal: Control of chronic pain  Description: Control of chronic pain  Outcome: Completed     Problem: Musculor/Skeletal Functional Status  Goal: Highest potential functional level  Outcome: Completed  Goal: Absence of falls  Outcome: Completed Normal vision: sees adequately in most situations; can see medication labels, newsprint

## 2020-09-26 NOTE — DISCHARGE SUMMARY
Physician Discharge Summary     Patient ID:  Kayleigh Iqbal  [id number]  88 y.o.  1975    Admit date: 9/17/2020    Discharge date and time: 6 31 20    Admission Diagnoses: Active Problems:    SIMÓN (acute kidney injury) ([facility] [address]    Septicemia ([facility]    Intra-abdominal infection  Resolved Problems:    * No resolved hospital problems. *      Discharge Diagnoses: Same    Consults: ID and surgery  Procedures: None invasive    Hospital Course:   History of Present Illness   Tere Rodríguez II is a 53 y.o. male who presents to [facility] ER complaining of abdominal pain, dizziness. Kayleigh Iqbal has a past medical history that includes diabetes mellitus, pulmonary embolism, diabetes, hypertension, colon mass. Maria Victoria Yarbrough presents with abdominal pain, bleeding from rectum, and dizziness. He does admit to some shortness of breath that also started today that is associated with exertion. He denies any chest pain. He was recently admitted previously for anemia from GI bleed due to recently diagnosed colon mass. He has a history of bilateral pulmonary embolism and IVC filter was recently placed in the setting of blood clots with recurrent bleeding. He also has JULIO drain from being previously admitted to MICU with sepsis from gallbladder or splenic abscess. He is tachycardic and hypotensive, complaining of abdominal pain. Surgery to see. Possibly needs SICU   ER Course   Upon presentation to the ER, routine labwork was performed which revealed CO2 21, Cr 1.5, AG 17, lacticf 4.2, alk phos 143, WBC 17.3, hemoglobin 10.0. Imaging results are as outlined below in the Imaging section of this note. EKG revealed sinus tachycardia. Upon arrival to the ER, patient was tachycardic at 133 with BP 93/74. The patient received 1.5L NS, fentanyl, zosyn in the emergency room and was admitted under the care of [address] [place] [address].   He was admitted to the ICU for sepsis and abdominal infection and he underwent an exploratory laparotomy with cholecystectomy and right hemicolectomy and small bowel resection and side-to-side ileocolonic anastomosis with splenectomy and omentectomy. He was also placed on empiric antibiotics till the 20th and then they were discontinued. He has to resume his hydroxyurea in the outpatient setting. Patient was followed by infectious disease. He was worked up for C. difficile which was negative. He was started on cholestyramine. Recent Labs     09/24/20  0500 09/25/20  0521 09/26/20  0620   WBC 23.8* 22.4* 22.4*   HGB 8.1* 8.0* 8.1*   HCT 25.3* 24.7* 24.7*   * 872* 850*   BUN 7 7 8        Discharge Exam:  HEENT unremarkable  Lungs decreased  Heart regular rate rhythm  Abdomen positive for staples in place  Extremities 1+ edema able to move all extremities    Disposition: SNF    Condition at discharge: stable    Patient Instructions:   Current Discharge Medication List      START taking these medications    Details   cholestyramine (QUESTRAN) 4 g packet Take 1 packet by mouth 2 times daily  Qty: 90 packet, Refills: 3      glucose (GLUTOSE) 40 % GEL Take 37.5 mLs by mouth as needed (low sugar)  Qty: 45 g, Refills: 1      insulin glargine (LANTUS) 100 UNIT/ML injection vial Inject 25 Units into the skin Daily  Qty: 1 vial, Refills: 3      enoxaparin (LOVENOX) 40 MG/0.4ML injection Inject 0.4 mLs into the skin daily  Qty: 30 Syringe, Refills: 3      potassium chloride (KLOR-CON M) 20 MEQ extended release tablet Take 2 tablets by mouth once for 1 dose  Qty: 60 tablet, Refills: 3      oxyCODONE (ROXICODONE) 5 MG immediate release tablet Take 1 tablet by mouth every 6 hours as needed for Pain for up to 7 days. Intended supply: 7 days.  Take lowest dose possible to manage pain  Qty: 28 tablet, Refills: 0    Comments: Reduce doses taken as pain becomes manageable  Associated Diagnoses: Post-op pain         CONTINUE these medications which have NOT CHANGED    Details   metoprolol tartrate (LOPRESSOR) 25 MG tablet Take 1 tablet by mouth 2 times daily  Qty: 60 tablet, Refills: 3      melatonin 3 MG TABS tablet Take 3 mg by mouth nightly as needed (sleep)      fluticasone (FLONASE) 50 MCG/ACT nasal spray 2 sprays by Nasal route daily      cyclobenzaprine (FLEXERIL) 10 MG tablet Take 10 mg by mouth three times daily      fenofibrate (TRICOR) 54 MG tablet Take 54 mg by mouth daily      ferrous sulfate (IRON 325) 325 (65 Fe) MG tablet Take 325 mg by mouth 2 times daily      hydroxyurea (HYDREA) 500 MG chemo capsule Take 500 mg by mouth 2 times daily      loperamide (IMODIUM) 2 MG capsule Take 2 mg by mouth 4 times daily as needed for Diarrhea      nystatin (MYCOSTATIN) 204920 UNIT/GM powder Apply topically 2 times daily      insulin lispro (HUMALOG) 100 UNIT/ML injection vial Inject into the skin 3 times daily (before meals) *Plus Sliding Scale*      pantoprazole (PROTONIX) 40 MG tablet Take 40 mg by mouth daily      DULoxetine (CYMBALTA) 30 MG extended release capsule Take 1 capsule by mouth daily  Qty: 90 capsule, Refills: 1    Associated Diagnoses: Neuropathic pain; Lumbar radiculopathy      pravastatin (PRAVACHOL) 20 MG tablet Take 1 tablet by mouth nightly  Qty: 90 tablet, Refills: 1    Associated Diagnoses: Mixed hyperlipidemia         STOP taking these medications       metFORMIN (GLUCOPHAGE) 500 MG tablet Comments:   Reason for Stopping:         pregabalin (LYRICA) 300 MG capsule Comments:   Reason for Stopping:           Nm Gi Blood Loss    Result Date: 2020  Patient MRN:  [id number] : 1975 Age: 53 years Gender: Male Order Date: EXAM: NM GI BLOOD LOSS NUMBER OF IMAGES:  11 INDICATION:  r/o small bowel source of GI bleed COMPARISON: 2020 RADIOPHARMACEUTICAL ADMINISTERED: 27 mCi technetium 99m UltraTag Technique: After injection of radiopharmaceutical, immediate and delayed images of the abdomen were acquired in the anterior projection. Findings:  No abnormal tracer identified to suggest GI bleed.      No abnormal tracer identified to suggest GI bleed. Ct Abdomen Pelvis W Iv Contrast Additional Contrast? None    Result Date: 2020  Patient MRN:  [id number] : 1975 Age: 53 years Gender: Male Order Date:  2020 4:51 PM EXAM: CT ABDOMEN PELVIS W IV CONTRAST NUMBER OF IMAGES \ views:  530 INDICATION: Dizziness, abdominal pain COMPARISON: Contemporaneous CTA chest study was performed, CT abdomen pelvis with contrast 5219 TECHNIQUE: Helical imaging was extended from the lower chest to the lower pelvis in conjunction with the intravenous administration of 90 mL of Isovue-370, and axial images were supplemented with sagittal and coronal MPR images. Low-dose CT  acquisition technique included one of following options; 1 . Automated exposure control, 2. Adjustment of MA and or KV according to patient's size or 3. Use of iterative reconstruction. FINDINGS: CHEST: Contemporaneous CTA imaging documented bilateral pulmonary emboli without right heart strain or acute pulmonary or pleural complications. LIVER: The liver is uniform in parenchymal density, and no focal lesions are identified. There is diffuse fatty change. GALLBLADDER AND BILIARY TREE: There is a pigtail drainage catheter in the gallbladder fossa, and it is uncertain if this is a post cholecystectomy drain or if this is a cholecystostomy associated with complete collapse of the gallbladder. There is no biliary ductal ectasia. SPLEEN: A circumscribed hypodense structure in the splenic bed could be a hypodense or infarcted spleen, but shows no evidence of extracapsular fluid or inflammatory change. The finding measures about 10 cm length by about 7 cm in thickness. It is uncertain if this represents residual following splenectomy, but there is no mention in the electronic record of splenectomy. . ADRENALS:  The adrenals are normal in appearance bilaterally. PANCREAS:The pancreas shows no evidence of acute inflammation or mass lesions. KIDNEYS/BLADDER: The kidneys show uniform cortical enhancement and no evidence of outflow obstruction. There is no perinephric inflammatory change. Ureters are similar in course and caliber, and the bladder is normal in appearance. APPENDIX:  Normal BOWEL:  There is no evidence of inflammation, obstruction, or perforation of enteric structures. There is some fluid filled distal ileum, and the previously identified thickening of jejunal loops in the left upper abdomen is again documented. The appearance suggests enteritis with focal stenosis in the anterior right paramedian mid abdomen suggesting a focal stricture. There is no evidence of fistula formation. Crohn's disease would be a differential diagnostic consideration. PELVIS:  There is no pelvic adenopathy or dependent free pelvic fluid. Caryn Sleet LYMPHATICS: Numerous small lymph nodes are clustered about the small bowel mesenteric root, and there are a few borderline prominent lymph nodes beneath the renal vascular pedicles. VASCULAR: There is no evidence of acute vascular pathology involving mesenteric or retroperitoneal great vessels. There is an inferior vena cava filter. SKELETAL: Mild levoscoliotic curvature and degenerative changes of the lumbar spine are noted. There are bilateral hip arthroplasty elements without acute complications. There is persistent small bowel mural thickening in the midabdomen involving the jejunum and proximal to mid ileum, with an apparent stenosis or focal stricture at about the level of the renal vascular pedicles and inferior vena cava filter in the right paramedian anterior abdomen. There is no evidence of perforation or obstruction, however. There is a pigtail drainage catheter in the gallbladder fossa, which could be a cholecystostomy catheter or a drainage catheter in the gallbladder fossa following cholecystectomy. The gallbladder is present, it is completely collapsed.  A cystic appearing structure is noted in the splenic bed, and could be a splenic remnant or a low-density spleen, not prominently enlarged. Xr Chest Portable    Result Date: 2020  Patient MRN:  [id number] : 1975 Age: 53 years Gender: Male Order Date:  2020 11:58 PM TECHNIQUE/NUMBER OF IMAGES/COMPARISON/CLINICAL HISTORY: Chest AP 1 image one view Comparison 10/17/2020. Central venous line placed. FINDINGS: Right internal jugular central venous catheter tip in SVC. There is no pneumothorax on the right breast. Lungs are clear. Heart and small size, mediastinum unremarkable. There is no perihilar vascular congestion. No acute cardiopulmonary process. Xr Chest Portable    Result Date: 2020  Single frontal projection (one view) of the chest. COMPARISON: 2020 FINDINGS: The heart, lungs, mediastinum and regional skeleton are unremarkable. The costophrenic angles are sharp and clear. There is no evidence of mediastinal shift. The heart is not enlarged. There is no evidence of hilar adenopathy. Lungs are negative for evidence of acute airspace consolidation, effusion, atelectasis, pneumothorax, pathologic nodularity or interstitial thickening. Regional bone density and trabecular pattern are normal.     Negative one view chest.    Cta Pulmonary W Contrast    Result Date: 2020  Patient MRN:  [id number] : 1975 Age: 53 years Gender: Male Order Date:  2020 4:51 PM EXAM: CTA PULMONARY W CONTRAST NUMBER OF IMAGES:  480 INDICATION: Dizziness, cholecystostomy drain COMPARISON: None Technique: Low-dose CT  acquisition technique included one of following options; 1 . Automated exposure control, 2. Adjustment of MA and or KV according to patient's size or 3. Use of iterative reconstruction. Contiguous spiral images were obtained in the axial plane, following the administration of 90 mL of Isovue-370 intravenous contrast using CT angiographic protocol.  Sagittal and coronal images were reconstructed from the axial plane Comparison: Prior study from 05/28/2020 is available Findings: Study demonstrate lower chest upper abdomen demonstrated the nasogastric tube to be in satisfactory position with the tip in the body of the stomach. There is an IVC filter noted to be in place. There is a right internal jugular catheter with tip in the superior vena cava. The abdomen gas pattern is nonspecific.      Nasogastric tube in satisfactory position Other findings as described above   Activity: activity as tolerated  Diet: diabetic diet    Follow-up with PCP and physician at facility    Note that over 30 minutes was spent in preparing discharge papers, discussing discharge with patient, medication review, etc.      Electronically signed by Catalina Watkins MD on 9/26/2020 at 3:24 PM

## 2020-09-29 ENCOUNTER — TELEPHONE (OUTPATIENT)
Dept: SURGERY | Age: 45
End: 2020-09-29

## 2020-10-01 ENCOUNTER — OFFICE VISIT (OUTPATIENT)
Dept: SURGERY | Age: 45
End: 2020-10-01

## 2020-10-01 VITALS
BODY MASS INDEX: 32.47 KG/M2 | TEMPERATURE: 98.2 F | RESPIRATION RATE: 18 BRPM | OXYGEN SATURATION: 96 % | HEART RATE: 96 BPM | DIASTOLIC BLOOD PRESSURE: 76 MMHG | SYSTOLIC BLOOD PRESSURE: 111 MMHG | WEIGHT: 245 LBS | HEIGHT: 73 IN

## 2020-10-01 PROCEDURE — 99024 POSTOP FOLLOW-UP VISIT: CPT | Performed by: SURGERY

## 2020-10-01 NOTE — PROGRESS NOTES
General Surgery Progress Note:    Cc: post op    S: doing well abd pain is minimal, danielle diet moving bowels more normally,       Objective:  @/76   Pulse 96   Temp 98.2 °F (36.8 °C) (Infrared)   Resp 18   Ht 6' 1\" (1.854 m)   Wt 245 lb (111.1 kg)   SpO2 96%   BMI 32.32 kg/m² @    Physical -     Gen: NAD  Resp: Breathing Comfortably, no resp distress  CV: Reg Rate  Abd: incision healing well staples removed,   EXT nvi, moderate b/l LE edema     Path neg for malignancy not clear as to etiology. Ischemic changes possible, inflammatory bowel disease is a ? But was definitely note clear on path     Assessment/Plan: s/p ex lap SBR with R hemicolectomy, cholecystectomy and splenectomy     - diet as danielle  - ID following for post splenectomy vaccines  - FU as needed.      Electronically Signed by Joy Moreno MD FACS   8:28 AM

## 2020-10-07 ENCOUNTER — APPOINTMENT (OUTPATIENT)
Dept: CT IMAGING | Age: 45
DRG: 182 | End: 2020-10-07
Payer: COMMERCIAL

## 2020-10-07 ENCOUNTER — APPOINTMENT (OUTPATIENT)
Dept: ULTRASOUND IMAGING | Age: 45
DRG: 182 | End: 2020-10-07
Payer: COMMERCIAL

## 2020-10-07 ENCOUNTER — HOSPITAL ENCOUNTER (INPATIENT)
Age: 45
LOS: 8 days | Discharge: HOME | DRG: 182 | End: 2020-10-15
Attending: EMERGENCY MEDICINE | Admitting: INTERNAL MEDICINE
Payer: COMMERCIAL

## 2020-10-07 PROBLEM — O22.30 DVT (DEEP VEIN THROMBOSIS) IN PREGNANCY: Status: ACTIVE | Noted: 2020-10-07

## 2020-10-07 PROBLEM — I82.409: Status: ACTIVE | Noted: 2020-10-07

## 2020-10-07 LAB
ABO/RH: NORMAL
ALBUMIN SERPL-MCNC: 2.6 G/DL (ref 3.5–5.2)
ALP BLD-CCNC: 171 U/L (ref 40–129)
ALT SERPL-CCNC: 13 U/L (ref 0–40)
ANION GAP SERPL CALCULATED.3IONS-SCNC: 14 MMOL/L (ref 7–16)
ANTIBODY SCREEN: NORMAL
APTT: 159.6 SEC (ref 24.5–35.1)
APTT: 30.3 SEC (ref 24.5–35.1)
AST SERPL-CCNC: 12 U/L (ref 0–39)
BASOPHILS ABSOLUTE: 0.02 E9/L (ref 0–0.2)
BASOPHILS RELATIVE PERCENT: 0.2 % (ref 0–2)
BILIRUB SERPL-MCNC: <0.2 MG/DL (ref 0–1.2)
BUN BLDV-MCNC: 8 MG/DL (ref 6–20)
CALCIUM SERPL-MCNC: 8.5 MG/DL (ref 8.6–10.2)
CHLORIDE BLD-SCNC: 99 MMOL/L (ref 98–107)
CO2: 23 MMOL/L (ref 22–29)
CREAT SERPL-MCNC: 0.8 MG/DL (ref 0.7–1.2)
EKG ATRIAL RATE: 111 BPM
EKG P AXIS: 65 DEGREES
EKG P-R INTERVAL: 146 MS
EKG Q-T INTERVAL: 336 MS
EKG QRS DURATION: 96 MS
EKG QTC CALCULATION (BAZETT): 456 MS
EKG R AXIS: 48 DEGREES
EKG T AXIS: 53 DEGREES
EKG VENTRICULAR RATE: 111 BPM
EOSINOPHILS ABSOLUTE: 0.16 E9/L (ref 0.05–0.5)
EOSINOPHILS RELATIVE PERCENT: 1.5 % (ref 0–6)
GFR AFRICAN AMERICAN: >60
GFR NON-AFRICAN AMERICAN: >60 ML/MIN/1.73
GLUCOSE BLD-MCNC: 134 MG/DL (ref 74–99)
HCT VFR BLD CALC: 26.9 % (ref 37–54)
HEMOGLOBIN: 8.3 G/DL (ref 12.5–16.5)
IMMATURE GRANULOCYTES #: 0.05 E9/L
IMMATURE GRANULOCYTES %: 0.5 % (ref 0–5)
INR BLD: 1.3
LYMPHOCYTES ABSOLUTE: 1.82 E9/L (ref 1.5–4)
LYMPHOCYTES RELATIVE PERCENT: 17.2 % (ref 20–42)
MCH RBC QN AUTO: 27.4 PG (ref 26–35)
MCHC RBC AUTO-ENTMCNC: 30.9 % (ref 32–34.5)
MCV RBC AUTO: 88.8 FL (ref 80–99.9)
METER GLUCOSE: 75 MG/DL (ref 74–99)
MONOCYTES ABSOLUTE: 1.01 E9/L (ref 0.1–0.95)
MONOCYTES RELATIVE PERCENT: 9.5 % (ref 2–12)
NEUTROPHILS ABSOLUTE: 7.52 E9/L (ref 1.8–7.3)
NEUTROPHILS RELATIVE PERCENT: 71.1 % (ref 43–80)
PDW BLD-RTO: 17.9 FL (ref 11.5–15)
PLATELET # BLD: 764 E9/L (ref 130–450)
PMV BLD AUTO: 8.4 FL (ref 7–12)
POTASSIUM REFLEX MAGNESIUM: 3.8 MMOL/L (ref 3.5–5)
PRO-BNP: 129 PG/ML (ref 0–125)
PROTHROMBIN TIME: 14.2 SEC (ref 9.3–12.4)
RBC # BLD: 3.03 E12/L (ref 3.8–5.8)
SODIUM BLD-SCNC: 136 MMOL/L (ref 132–146)
TOTAL PROTEIN: 6 G/DL (ref 6.4–8.3)
TROPONIN: <0.01 NG/ML (ref 0–0.03)
WBC # BLD: 10.6 E9/L (ref 4.5–11.5)

## 2020-10-07 PROCEDURE — 2580000003 HC RX 258: Performed by: INTERNAL MEDICINE

## 2020-10-07 PROCEDURE — 86850 RBC ANTIBODY SCREEN: CPT

## 2020-10-07 PROCEDURE — 6360000002 HC RX W HCPCS: Performed by: EMERGENCY MEDICINE

## 2020-10-07 PROCEDURE — 85730 THROMBOPLASTIN TIME PARTIAL: CPT

## 2020-10-07 PROCEDURE — 85025 COMPLETE CBC W/AUTO DIFF WBC: CPT

## 2020-10-07 PROCEDURE — 2580000003 HC RX 258: Performed by: RADIOLOGY

## 2020-10-07 PROCEDURE — 83880 ASSAY OF NATRIURETIC PEPTIDE: CPT

## 2020-10-07 PROCEDURE — 6360000004 HC RX CONTRAST MEDICATION: Performed by: RADIOLOGY

## 2020-10-07 PROCEDURE — 86900 BLOOD TYPING SEROLOGIC ABO: CPT

## 2020-10-07 PROCEDURE — 93971 EXTREMITY STUDY: CPT | Performed by: RADIOLOGY

## 2020-10-07 PROCEDURE — 6360000002 HC RX W HCPCS

## 2020-10-07 PROCEDURE — 71275 CT ANGIOGRAPHY CHEST: CPT

## 2020-10-07 PROCEDURE — 80053 COMPREHEN METABOLIC PANEL: CPT

## 2020-10-07 PROCEDURE — 99254 IP/OBS CNSLTJ NEW/EST MOD 60: CPT | Performed by: SURGERY

## 2020-10-07 PROCEDURE — 96375 TX/PRO/DX INJ NEW DRUG ADDON: CPT

## 2020-10-07 PROCEDURE — 86901 BLOOD TYPING SEROLOGIC RH(D): CPT

## 2020-10-07 PROCEDURE — 93010 ELECTROCARDIOGRAM REPORT: CPT | Performed by: INTERNAL MEDICINE

## 2020-10-07 PROCEDURE — 93971 EXTREMITY STUDY: CPT

## 2020-10-07 PROCEDURE — 93005 ELECTROCARDIOGRAM TRACING: CPT | Performed by: EMERGENCY MEDICINE

## 2020-10-07 PROCEDURE — 6370000000 HC RX 637 (ALT 250 FOR IP): Performed by: HOSPITALIST

## 2020-10-07 PROCEDURE — 2060000000 HC ICU INTERMEDIATE R&B

## 2020-10-07 PROCEDURE — 96365 THER/PROPH/DIAG IV INF INIT: CPT

## 2020-10-07 PROCEDURE — 82962 GLUCOSE BLOOD TEST: CPT

## 2020-10-07 PROCEDURE — 85610 PROTHROMBIN TIME: CPT

## 2020-10-07 PROCEDURE — 84484 ASSAY OF TROPONIN QUANT: CPT

## 2020-10-07 PROCEDURE — 99285 EMERGENCY DEPT VISIT HI MDM: CPT

## 2020-10-07 PROCEDURE — 6370000000 HC RX 637 (ALT 250 FOR IP): Performed by: EMERGENCY MEDICINE

## 2020-10-07 RX ORDER — FENOFIBRATE 54 MG/1
54 TABLET ORAL DAILY
Status: DISCONTINUED | OUTPATIENT
Start: 2020-10-07 | End: 2020-10-15 | Stop reason: HOSPADM

## 2020-10-07 RX ORDER — FLUTICASONE PROPIONATE 50 MCG
2 SPRAY, SUSPENSION (ML) NASAL DAILY
Status: DISCONTINUED | OUTPATIENT
Start: 2020-10-07 | End: 2020-10-15 | Stop reason: HOSPADM

## 2020-10-07 RX ORDER — KETOROLAC TROMETHAMINE 30 MG/ML
30 INJECTION, SOLUTION INTRAMUSCULAR; INTRAVENOUS ONCE
Status: COMPLETED | OUTPATIENT
Start: 2020-10-07 | End: 2020-10-07

## 2020-10-07 RX ORDER — HEPARIN SODIUM 1000 [USP'U]/ML
80 INJECTION, SOLUTION INTRAVENOUS; SUBCUTANEOUS PRN
Status: DISCONTINUED | OUTPATIENT
Start: 2020-10-07 | End: 2020-10-11

## 2020-10-07 RX ORDER — HEPARIN SODIUM 1000 [USP'U]/ML
80 INJECTION, SOLUTION INTRAVENOUS; SUBCUTANEOUS ONCE
Status: COMPLETED | OUTPATIENT
Start: 2020-10-07 | End: 2020-10-07

## 2020-10-07 RX ORDER — LOPERAMIDE HYDROCHLORIDE 2 MG/1
2 CAPSULE ORAL 4 TIMES DAILY PRN
Status: DISCONTINUED | OUTPATIENT
Start: 2020-10-07 | End: 2020-10-15 | Stop reason: HOSPADM

## 2020-10-07 RX ORDER — CYCLOBENZAPRINE HCL 10 MG
10 TABLET ORAL 3 TIMES DAILY
Status: DISCONTINUED | OUTPATIENT
Start: 2020-10-07 | End: 2020-10-15 | Stop reason: HOSPADM

## 2020-10-07 RX ORDER — POTASSIUM CHLORIDE 20 MEQ/1
40 TABLET, EXTENDED RELEASE ORAL ONCE
Status: CANCELLED | OUTPATIENT
Start: 2020-10-07 | End: 2020-10-07

## 2020-10-07 RX ORDER — ACETAMINOPHEN 650 MG/1
650 SUPPOSITORY RECTAL EVERY 6 HOURS PRN
Status: DISCONTINUED | OUTPATIENT
Start: 2020-10-07 | End: 2020-10-15 | Stop reason: HOSPADM

## 2020-10-07 RX ORDER — SODIUM CHLORIDE 0.9 % (FLUSH) 0.9 %
10 SYRINGE (ML) INJECTION EVERY 12 HOURS SCHEDULED
Status: DISCONTINUED | OUTPATIENT
Start: 2020-10-07 | End: 2020-10-08 | Stop reason: SDUPTHER

## 2020-10-07 RX ORDER — INSULIN GLARGINE 100 [IU]/ML
25 INJECTION, SOLUTION SUBCUTANEOUS DAILY
Status: DISCONTINUED | OUTPATIENT
Start: 2020-10-08 | End: 2020-10-15 | Stop reason: HOSPADM

## 2020-10-07 RX ORDER — HEPARIN SODIUM 10000 [USP'U]/100ML
18 INJECTION, SOLUTION INTRAVENOUS CONTINUOUS
Status: DISCONTINUED | OUTPATIENT
Start: 2020-10-07 | End: 2020-10-11

## 2020-10-07 RX ORDER — DULOXETIN HYDROCHLORIDE 30 MG/1
30 CAPSULE, DELAYED RELEASE ORAL DAILY
Status: DISCONTINUED | OUTPATIENT
Start: 2020-10-07 | End: 2020-10-15 | Stop reason: HOSPADM

## 2020-10-07 RX ORDER — ONDANSETRON 2 MG/ML
4 INJECTION INTRAMUSCULAR; INTRAVENOUS EVERY 6 HOURS PRN
Status: DISCONTINUED | OUTPATIENT
Start: 2020-10-07 | End: 2020-10-08 | Stop reason: SDUPTHER

## 2020-10-07 RX ORDER — HYDROCODONE BITARTRATE AND ACETAMINOPHEN 5; 325 MG/1; MG/1
2 TABLET ORAL ONCE
Status: COMPLETED | OUTPATIENT
Start: 2020-10-07 | End: 2020-10-07

## 2020-10-07 RX ORDER — PANTOPRAZOLE SODIUM 40 MG/1
40 TABLET, DELAYED RELEASE ORAL DAILY
Status: DISCONTINUED | OUTPATIENT
Start: 2020-10-07 | End: 2020-10-15 | Stop reason: HOSPADM

## 2020-10-07 RX ORDER — KETOROLAC TROMETHAMINE 30 MG/ML
INJECTION, SOLUTION INTRAMUSCULAR; INTRAVENOUS
Status: COMPLETED
Start: 2020-10-07 | End: 2020-10-07

## 2020-10-07 RX ORDER — PRAVASTATIN SODIUM 20 MG
20 TABLET ORAL NIGHTLY
Status: DISCONTINUED | OUTPATIENT
Start: 2020-10-07 | End: 2020-10-15 | Stop reason: HOSPADM

## 2020-10-07 RX ORDER — SODIUM CHLORIDE 0.9 % (FLUSH) 0.9 %
10 SYRINGE (ML) INJECTION PRN
Status: DISCONTINUED | OUTPATIENT
Start: 2020-10-07 | End: 2020-10-08 | Stop reason: SDUPTHER

## 2020-10-07 RX ORDER — FERROUS SULFATE 325(65) MG
325 TABLET ORAL 2 TIMES DAILY
Status: DISCONTINUED | OUTPATIENT
Start: 2020-10-07 | End: 2020-10-15 | Stop reason: HOSPADM

## 2020-10-07 RX ORDER — SODIUM CHLORIDE 0.9 % (FLUSH) 0.9 %
10 SYRINGE (ML) INJECTION
Status: ACTIVE | OUTPATIENT
Start: 2020-10-07 | End: 2020-10-07

## 2020-10-07 RX ORDER — MORPHINE SULFATE 4 MG/ML
6 INJECTION, SOLUTION INTRAMUSCULAR; INTRAVENOUS ONCE
Status: COMPLETED | OUTPATIENT
Start: 2020-10-07 | End: 2020-10-07

## 2020-10-07 RX ORDER — ACETAMINOPHEN 325 MG/1
650 TABLET ORAL EVERY 6 HOURS PRN
Status: DISCONTINUED | OUTPATIENT
Start: 2020-10-07 | End: 2020-10-08 | Stop reason: SDUPTHER

## 2020-10-07 RX ORDER — HEPARIN SODIUM 1000 [USP'U]/ML
40 INJECTION, SOLUTION INTRAVENOUS; SUBCUTANEOUS PRN
Status: DISCONTINUED | OUTPATIENT
Start: 2020-10-07 | End: 2020-10-11

## 2020-10-07 RX ORDER — LANOLIN ALCOHOL/MO/W.PET/CERES
3 CREAM (GRAM) TOPICAL NIGHTLY PRN
Status: DISCONTINUED | OUTPATIENT
Start: 2020-10-07 | End: 2020-10-15 | Stop reason: HOSPADM

## 2020-10-07 RX ORDER — CHOLESTYRAMINE 4 G/9G
1 POWDER, FOR SUSPENSION ORAL 2 TIMES DAILY
Status: DISCONTINUED | OUTPATIENT
Start: 2020-10-07 | End: 2020-10-15 | Stop reason: HOSPADM

## 2020-10-07 RX ORDER — POLYETHYLENE GLYCOL 3350 17 G/17G
17 POWDER, FOR SOLUTION ORAL DAILY PRN
Status: DISCONTINUED | OUTPATIENT
Start: 2020-10-07 | End: 2020-10-15 | Stop reason: HOSPADM

## 2020-10-07 RX ORDER — HYDROXYUREA 500 MG/1
500 CAPSULE ORAL 2 TIMES DAILY
Status: DISCONTINUED | OUTPATIENT
Start: 2020-10-07 | End: 2020-10-15 | Stop reason: HOSPADM

## 2020-10-07 RX ORDER — PROMETHAZINE HYDROCHLORIDE 25 MG/1
12.5 TABLET ORAL EVERY 6 HOURS PRN
Status: DISCONTINUED | OUTPATIENT
Start: 2020-10-07 | End: 2020-10-15 | Stop reason: HOSPADM

## 2020-10-07 RX ADMIN — MORPHINE SULFATE 6 MG: 4 INJECTION, SOLUTION INTRAMUSCULAR; INTRAVENOUS at 16:01

## 2020-10-07 RX ADMIN — FLUTICASONE PROPIONATE 2 SPRAY: 50 SPRAY, METERED NASAL at 22:37

## 2020-10-07 RX ADMIN — PRAVASTATIN SODIUM 20 MG: 20 TABLET ORAL at 22:36

## 2020-10-07 RX ADMIN — SODIUM CHLORIDE, PRESERVATIVE FREE 10 ML: 5 INJECTION INTRAVENOUS at 22:38

## 2020-10-07 RX ADMIN — HEPARIN SODIUM AND DEXTROSE 15 UNITS/KG/HR: 10000; 5 INJECTION INTRAVENOUS at 22:32

## 2020-10-07 RX ADMIN — KETOROLAC TROMETHAMINE 30 MG: 30 INJECTION, SOLUTION INTRAMUSCULAR; INTRAVENOUS at 19:28

## 2020-10-07 RX ADMIN — HYDROMORPHONE HYDROCHLORIDE 1 MG: 1 INJECTION, SOLUTION INTRAMUSCULAR; INTRAVENOUS; SUBCUTANEOUS at 22:38

## 2020-10-07 RX ADMIN — FERROUS SULFATE TAB 325 MG (65 MG ELEMENTAL FE) 325 MG: 325 (65 FE) TAB at 19:37

## 2020-10-07 RX ADMIN — CHOLESTYRAMINE 4 G: 4 POWDER, FOR SUSPENSION ORAL at 22:36

## 2020-10-07 RX ADMIN — CYCLOBENZAPRINE 10 MG: 10 TABLET, FILM COATED ORAL at 19:36

## 2020-10-07 RX ADMIN — PANTOPRAZOLE SODIUM 40 MG: 40 TABLET, DELAYED RELEASE ORAL at 19:36

## 2020-10-07 RX ADMIN — HYDROXYUREA 500 MG: 500 CAPSULE ORAL at 22:37

## 2020-10-07 RX ADMIN — HYDROCODONE BITARTRATE AND ACETAMINOPHEN 2 TABLET: 5; 325 TABLET ORAL at 13:30

## 2020-10-07 RX ADMIN — FENOFIBRATE 54 MG: 54 TABLET ORAL at 22:37

## 2020-10-07 RX ADMIN — HEPARIN SODIUM AND DEXTROSE 18 UNITS/KG/HR: 10000; 5 INJECTION INTRAVENOUS at 16:02

## 2020-10-07 RX ADMIN — METOPROLOL TARTRATE 25 MG: 25 TABLET, FILM COATED ORAL at 19:36

## 2020-10-07 RX ADMIN — Medication 10 ML: at 15:47

## 2020-10-07 RX ADMIN — DULOXETINE HYDROCHLORIDE 30 MG: 30 CAPSULE, DELAYED RELEASE ORAL at 22:36

## 2020-10-07 RX ADMIN — HEPARIN SODIUM 8890 UNITS: 1000 INJECTION INTRAVENOUS; SUBCUTANEOUS at 08:00

## 2020-10-07 RX ADMIN — IOPAMIDOL 75 ML: 755 INJECTION, SOLUTION INTRAVENOUS at 15:47

## 2020-10-07 ASSESSMENT — PAIN DESCRIPTION - ORIENTATION
ORIENTATION: LEFT
ORIENTATION: RIGHT;LEFT
ORIENTATION: LEFT;RIGHT

## 2020-10-07 ASSESSMENT — PAIN DESCRIPTION - DESCRIPTORS: DESCRIPTORS: ACHING

## 2020-10-07 ASSESSMENT — PAIN DESCRIPTION - FREQUENCY: FREQUENCY: CONTINUOUS

## 2020-10-07 ASSESSMENT — PAIN DESCRIPTION - LOCATION
LOCATION: LEG
LOCATION: FOOT;LEG;HAND
LOCATION: LEG

## 2020-10-07 ASSESSMENT — PAIN DESCRIPTION - ONSET: ONSET: ON-GOING

## 2020-10-07 ASSESSMENT — PAIN - FUNCTIONAL ASSESSMENT: PAIN_FUNCTIONAL_ASSESSMENT: ACTIVITIES ARE NOT PREVENTED

## 2020-10-07 ASSESSMENT — PAIN DESCRIPTION - PAIN TYPE
TYPE: ACUTE PAIN

## 2020-10-07 ASSESSMENT — PAIN DESCRIPTION - PROGRESSION: CLINICAL_PROGRESSION: GRADUALLY WORSENING

## 2020-10-07 ASSESSMENT — PAIN SCALES - GENERAL
PAINLEVEL_OUTOF10: 9
PAINLEVEL_OUTOF10: 6
PAINLEVEL_OUTOF10: 5
PAINLEVEL_OUTOF10: 8
PAINLEVEL_OUTOF10: 8
PAINLEVEL_OUTOF10: 9
PAINLEVEL_OUTOF10: 9
PAINLEVEL_OUTOF10: 5

## 2020-10-07 NOTE — H&P
Hospital Medicine History & Physical      PCP: IGOR Salcido - CNP    Date of Admission: 10/7/2020    Date of Service: Pt seen/examined on 10/7/20 and Admitted to Inpatient with expected LOS greater than two midnights due to medical therapy. 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 Pt was discharged to nursing home on lovenox. He presented from SNF for L leg swelling. In ED he was found to have DVT - started on heparin drip and admitted for further evaluation and treatment.        Past Medical History:          Diagnosis Date    Accident 11/2019    stepped on nail rt ft about 1 month ago- healed per patient    SIMÓN (acute kidney injury) ([address] 2008 apx    kidney bruised due to fall / Areli Catena    Allergic rhinitis     Chronic back pain     Depression     Diabetes mellitus ([address]     Difficulty sleeping     at times    Displacement of lumbar intervertebral disc without myelopathy     Fibromyalgia     Fractured rib     2008 / healed    H/O seasonal allergies     Head injury 1980'S apx    no residual s/s    Hyperlipidemia     Hypertension     Obesity (BMI 35.0-39.9 without comorbidity)     bmi 39.2  weight 296 #    Osteoarthritis     Thoracic or lumbosacral neuritis or radiculitis, unspecified        Past Surgical History:          Procedure Laterality Date    COLONOSCOPY N/A 9/5/2020    COLONOSCOPY WITH BIOPSY performed by Joy Moreno MD at [address] [facility] CT PTC NEW ACCESS  8/3/2020    CT PTC NEW ACCESS 8/3/2020 [facility] CT    FOOT SURGERY Right 1985    to treat shattered bones    HERNIA REPAIR  2001    DOUBLE HERNIA    HERNIA REPAIR Right 12/9/2019    LAPAROSCOPIC RIGHT INGUINAL HERNIA REPAIR, MESH 10x15 cm PLACEMENT performed by Cadence Crowder MD at [facility] Left 4/11/2016    LAPAROTOMY N/A 9/18/2020    LAPAROTOMY EXPLORATORY, CHOLECYSTECTOMY, BOWEL RESECTION, right darian colectomy, partial omentectomy, and splenectomy performed by Tyson Jay MD at [address] FLX DX W/[employer] [place] 1978 PFRMD N/A 5/7/2018    COLONOSCOPY DIAGNOSTIC performed by Therese Ward MD at [facility] Left 2014    Dr. Kray Painting ARTHROSCOPY Left 11 21 14    SHOULDER SURGERY  2001 &2007    RIGHT AND LEFT repair of tears    TOTAL HIP ARTHROPLASTY Right 10/31/2016    Total right hip  Carlie Roach MD    UPPER GASTROINTESTINAL ENDOSCOPY N/A 9/4/2020    EGD DIAGNOSTIC ONLY performed by Dania Ramesh MD at [place] [address]  2007    LEFT WRIST       Medications Prior to Admission:      Prior to Admission medications    Medication Sig Start Date End Date Taking?  Authorizing Provider   cholestyramine (QUESTRAN) 4 g packet Take 1 packet by mouth 2 times daily 9/26/20   Renetta oJse MD   glucose (GLUTOSE) 40 % GEL Take 37.5 mLs by mouth as needed (low sugar) 9/26/20   Renetta Jose MD   insulin glargine (LANTUS) 100 UNIT/ML injection vial Inject 25 Units into the skin Daily 9/27/20   Renetta Jose MD   enoxaparin (LOVENOX) 40 MG/0.4ML injection Inject 0.4 mLs into the skin daily 9/27/20   Renetta Jose MD   metoprolol tartrate (LOPRESSOR) 25 MG tablet Take 1 tablet by mouth 2 times daily 9/11/20   Nani O MD Meche   melatonin 3 MG TABS tablet Take 3 mg by mouth nightly as needed (sleep)    Historical Provider, MD   fluticasone (FLONASE) 50 MCG/ACT nasal spray 2 sprays by Nasal route daily    Historical Provider, MD   cyclobenzaprine (FLEXERIL) 10 MG tablet Take 10 mg by mouth three times daily    Historical Provider, MD fenofibrate (TRICOR) 54 MG tablet Take 54 mg by mouth daily    Historical Provider, MD   ferrous sulfate (IRON 325) 325 (65 Fe) MG tablet Take 325 mg by mouth 2 times daily    Historical Provider, MD   hydroxyurea (HYDREA) 500 MG chemo capsule Take 500 mg by mouth 2 times daily    Historical Provider, MD   loperamide (IMODIUM) 2 MG capsule Take 2 mg by mouth 4 times daily as needed for Diarrhea    Historical Provider, MD   insulin lispro (HUMALOG) 100 UNIT/ML injection vial Inject 3 Units into the skin 3 times daily (before meals) Injects 3 units three times daily before meals in addition to following sliding scale  : 0 units  141-180: 1 unit  181-220: 2 units  221-260: 3 units  261-300: 4 units  301-340: 5 units  >341: 6 units and call MD    Historical Provider, MD   pantoprazole (PROTONIX) 40 MG tablet Take 40 mg by mouth daily    Historical Provider, MD   DULoxetine (CYMBALTA) 30 MG extended release capsule Take 1 capsule by mouth daily 5/14/20   IGOR Michaels CNP   pravastatin (PRAVACHOL) 20 MG tablet Take 1 tablet by mouth nightly 5/14/20   IGOR Michaels CNP       Allergies:  Seasonal and Tramadol    Social History:      The patient currently lives at nursing facility. States that since his hospitalization in June he has required to be at a SNF bc he is unable to care for himself. TOBACCO:   reports that he has never smoked. His smokeless tobacco use includes chew. ETOH:   reports previous alcohol use. Family History:      Reviewed in detail and negative for DM, CAD, Cancer, CVA. Positive as follows:        Problem Relation Age of Onset    Hypertension Mother     Arthritis Father     Diabetes Father     Cancer Maternal Grandfather         Skin    Cancer Paternal Uncle         skin    Diabetes Paternal Grandfather     Heart Disease Maternal Grandmother     Stroke Maternal Aunt        REVIEW OF SYSTEMS:   Pertinent positives as noted in the HPI.  All other systems reviewed and negative. PHYSICAL EXAM:    /75   Pulse 100   Temp 97 °F (36.1 °C) (Temporal)   Resp 20   Ht 6' 1\" (1.854 m)   Wt 245 lb (111.1 kg)   SpO2 94%   BMI 32.32 kg/m²     General appearance:  No apparent distress, appears stated age and cooperative. HEENT:  Normal cephalic, atraumatic without obvious deformity. Pupils equal, round, and reactive to light. Extra ocular muscles intact. Conjunctivae/corneas clear. Neck: Supple, with full range of motion. No jugular venous distention. Trachea midline. Respiratory:  Normal respiratory effort. Clear to auscultation, bilaterally without Rales/Wheezes/Rhonchi. Cardiovascular:  Tachycardic, regular rhythm  Abdomen: Soft, non-tender, non-distended with normal bowel sounds. Musculoskeletal: LLE swelling   Skin: Skin color, texture, turgor normal.  No rashes or lesions. Abdominal incision with mild oozing near umbilicus   Neurologic:  RUE weakness - pt reports is his baseline   Psychiatric:  Alert and oriented, thought content appropriate, normal insight    CTA PULMONARY W CONTRAST   Final Result   1. Small filling defect is located within right lower lobe segmental   pulmonary artery and small filling defect is present within lingular   segmental pulmonary artery. These findings are related to chronic bilateral   pulmonary emboli. [place] of pulmonary embolus has decreased compared to   prior from September 17, 2020.      2.  New minimal left pleural effusion. 3. Foci of gas are associated with the subxiphoid abdominal wall as well as   areas of inflammation located in right upper abdomen below margin of liver. Clinical correlation recommended for recent surgery.       Critical results were called by Dr. Katelyn Ayers to [address] [place] [address] on   10/7/2020 at 15:06.         US DUP LOWER EXTREMITY LEFT JORGE   Final Result   There is evidence for a large deep venous thrombosis involving the   entire left leg from the left iliac to the level of the trifurcation   trifurcation veins below the knee      ALERT:  THIS IS AN ABNORMAL REPORT                     Labs:     Recent Labs     10/07/20  1318   WBC 10.6   HGB 8.3*   HCT 26.9*   *     Recent Labs     10/07/20  1318      K 3.8   CL 99   CO2 23   BUN 8   CREATININE 0.8   CALCIUM 8.5*     Recent Labs     10/07/20  1318   AST 12   ALT 13   BILITOT <0.2   ALKPHOS 171*     Recent Labs     10/07/20  1318   INR 1.3     Recent Labs     10/07/20  1318   TROPONINI <0.01       Urinalysis:      Lab Results   Component Value Date    NITRU Negative 09/17/2020    BLOODU Negative 09/17/2020    SPECGRAV 1.010 09/17/2020    GLUCOSEU Negative 09/17/2020         ASSESSMENT:  LLE DVT w hx of IVC  B/l PE  DM2  HLD  HTN  Hx of GI bleed  Recent cholecystectomy with oozing of abdominal wound  Recent SBR with R hemicolectomy and splenectomy    PLAN:  Continue home meds as ordered  PT/OT  Heparin drip  Surgery consulted due to oozing from incision wound  Pain control  Vascular surgery consulted         DVT Prophylaxis: heparin drip  Diet: DIET CARB CONTROL;  Code Status: Full Code    PT/OT Eval Status: ordered    Dispo - inpatient; dispo at [facility] pending pt/ot Stiven Grigsby MD    Thank you IGOR Molina CNP for the opportunity to be involved in this patient's care. If you have any questions or concerns please feel free to contact me at [phone].

## 2020-10-07 NOTE — CONSULTS
GENERAL SURGERY  CONSULT NOTE  10/7/2020    Physician Consulted: Dr. Batool May  Reason for Consult: Midline Incision Drainage  Referring Physician: Dr. Pillo BYRD  Joel Reed is a 53 y.o. male who presents for evaluation of left leg pain. This started approximately one week ago and has gotten progressively worse. His leg has been progressively swelling more over this time as well. The pain is mostly localized to behind the knee and into the left groin. He cannot walk due to the pain in his left leg. He is taking Lovenox injections for anticoagulation due to history of of PE, splenic thrombosis. The patient also has a midline incision from recent ex lap with Dr. Sofía Sung with with cholecystectomy and right hemicolectomy and small bowel resection with side to side anastomosis, splenectomy and omectomy. There is a small amount of drainage from the inferior portion. There is no redness or swelling. It is minimally tender. Pertinent history    The patient was recently hospitalized on June 2nd after he was SOB and found to have bilateral PE. 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 He was treated in [place] and discharged on a blood thinner, he cannot recall the exact one. He was found to significant thrombocytosis and was seen by hematology. He presented to the ED on 7/31 and found to have an abscess of the spleen. He was treated conservatively and discharged to [facility]. The patient was having blood per rectum and on 9/4 underwent EGD which showed mild gastritis and 9/5 a coloscopy showed area of abnormal tissue around ileocecal valve. His blood thinners were held, and an IVC filter was placed. He presented to the hospital on 9/11 for abdominal pain. He was found to have intraabdominal abscess and underwent exploratory laparotomy with cholecystectomy and right hemicolectomy and small bowel resection with side to side anastomosis, splenectomy and omectomy.      He was seen in clinic a week ago and had his midline staples removed. He did have some minor drainage from the lower portion of the incision.     Past Medical History:   Diagnosis Date    Accident 11/2019    stepped on nail rt ft about 1 month ago- healed per patient    SIMÓN (acute kidney [address]) ([facility] 2008 apx    kidney bruised due to fall / Alaina Plump    Allergic rhinitis     Chronic back pain     Depression     Diabetes mellitus ([facility]     Difficulty sleeping     at times    Displacement of lumbar intervertebral disc without myelopathy     Fibromyalgia     Fractured rib     2008 / healed    H/O seasonal allergies     Head injury 1980'S apx    no residual s/s    Hyperlipidemia     Hypertension     Obesity (BMI 35.0-39.9 without comorbidity)     bmi 39.2  weight 296 #    Osteoarthritis     Thoracic or lumbosacral neuritis or radiculitis, unspecified        Past Surgical History:   Procedure Laterality Date    COLONOSCOPY N/A 9/5/2020    COLONOSCOPY WITH BIOPSY performed by Dominique Claudio MD at [address] [facility] [address] PTC NEW ACCESS  8/3/2020    CT PTC NEW ACCESS 8/3/2020 [facility] CT    FOOT SURGERY Right 1985    to treat shattered bones    HERNIA REPAIR  2001    DOUBLE HERNIA    HERNIA REPAIR Right 12/9/2019    LAPAROSCOPIC RIGHT INGUINAL HERNIA REPAIR, MESH 10x15 cm PLACEMENT performed by Enoc Navarrete MD at [address] [facility] Left 4/11/2016    LAPAROTOMY N/A 9/18/2020    LAPAROTOMY EXPLORATORY, CHOLECYSTECTOMY, BOWEL RESECTION, right darian colectomy, partial omentectomy, and splenectomy performed by Dominique Claudio MD at [address] [facility] FLX DX W/MARIA ALEJANDRA [place] 1978 PFRMD N/A 5/7/2018    COLONOSCOPY DIAGNOSTIC performed by Dona Galarza MD [facility]    Dr. Zelda Alonso ARTHROSCOPY Left 11 21 14    SHOULDER SURGERY  2001 &2007    RIGHT AND LEFT repair of tears    TOTAL HIP ARTHROPLASTY Right 10/31/2016    Total right hip  Cem Heaton MD    UPPER GASTROINTESTINAL ENDOSCOPY N/A 9/4/2020    EGD DIAGNOSTIC ONLY performed by Gena Bateman MD at [facility] 2007    LEFT WRIST       Medications Prior to Admission:    Prior to Admission medications    Medication Sig Start Date End Date Taking?  Authorizing Provider   cholestyramine (QUESTRAN) 4 g packet Take 1 packet by mouth 2 times daily 9/26/20   Brendon Davenport MD   glucose (GLUTOSE) 40 % GEL Take 37.5 mLs by mouth as needed (low sugar) 9/26/20   Brendon Davenport MD   insulin glargine (LANTUS) 100 UNIT/ML injection vial Inject 25 Units into the skin Daily 9/27/20   Brendon Davenport MD   enoxaparin (LOVENOX) 40 MG/0.4ML injection Inject 0.4 mLs into the skin daily 9/27/20   Brendon Davenport MD   metoprolol tartrate (LOPRESSOR) 25 MG tablet Take 1 tablet by mouth 2 times daily 9/11/20   Teofilo Naqvi MD   melatonin 3 MG TABS tablet Take 3 mg by mouth nightly as needed (sleep)    Historical Provider, MD   fluticasone (FLONASE) 50 MCG/ACT nasal spray 2 sprays by Nasal route daily    Historical Provider, MD   cyclobenzaprine (FLEXERIL) 10 MG tablet Take 10 mg by mouth three times daily    Historical Provider, MD   fenofibrate (TRICOR) 54 MG tablet Take 54 mg by mouth daily    Historical Provider, MD   ferrous sulfate (IRON 325) 325 (65 Fe) MG tablet Take 325 mg by mouth 2 times daily    Historical Provider, MD   hydroxyurea (HYDREA) 500 MG chemo capsule Take 500 mg by mouth 2 times daily    Historical Provider, MD   loperamide (IMODIUM) 2 MG capsule Take 2 mg by mouth 4 times daily as needed for Diarrhea    Historical Provider, MD   insulin lispro (HUMALOG) 100 UNIT/ML injection vial Inject 3 Units into the skin 3 times daily (before meals) Injects 3 units three times daily before meals in addition to following sliding scale  : 0 units  141-180: 1 unit  181-220: 2 units  221-260: 3 units  261-300: 4 units  301-340: 5 units  >341: 6 units and call MD    Historical Provider, MD   pantoprazole (PROTONIX) 40 MG tablet Take 40 mg by mouth daily    Historical Provider, MD   DULoxetine (CYMBALTA) 30 MG extended release capsule Take 1 capsule by mouth daily 5/14/20   IGOR Molina CNP   pravastatin (PRAVACHOL) 20 MG tablet Take 1 tablet by mouth nightly 5/14/20   IGOR Molina CNP       Allergies   Allergen Reactions    Seasonal      HAYFEVER / sneezing,watery eyes    Tramadol Itching       Family History   Problem Relation Age of Onset    Hypertension Mother     Arthritis Father     Diabetes Father     Cancer Maternal Grandfather         Skin    Cancer Paternal Uncle         skin    Diabetes Paternal Grandfather     Heart Disease Maternal Grandmother     Stroke Maternal Aunt        Social History     Tobacco Use    Smoking status: Never Smoker    Smokeless tobacco: Current User     Types: Chew   Substance Use Topics    Alcohol use: Not Currently     Alcohol/week: 0.0 standard drinks     Frequency: Monthly or less    Drug use: No         Review of Systems   General ROS: negative for - chills, fever or night sweats  Hematological and Lymphatic ROS: positive for - blood clots and thrombocytosis, on lovenox  Respiratory ROS: no cough, shortness of breath, or wheezing. Hx of bilateral PE  Cardiovascular ROS: no chest pain or dyspnea on exertion  Gastrointestinal ROS: Minimal abdominal pain. No nausea or vomiting. Musculoskeletal ROS: Left leg swelling and pain      PHYSICAL EXAM:    Vitals:    10/07/20 1536   BP: 116/75   Pulse: 100   Resp: 20   Temp:    SpO2: 94%       General Appearance:  awake, alert, oriented, in no acute distress  Skin:  Skin color, texture, turgor normal. No rashes or lesions. Head/face:  NCAT  Eyes:  No gross abnormalities. and Sclera nonicteric  Lungs:  Normal expansion. Clear to auscultation.   No acute respiratory distress  Heart:  Tachycardia, regular rythym  Abdomen:  Midline incision with minor skin dehiscence above the umbilicus. This is 1.5cm L X 1cm W X 1.5cm D. There is serous drainage, a fibrinous base. No surrounding erythema or cellulitis. Drainage is non purulent. Extremities: Left extremity is significantly swollen from grown to foot. It is TTP particularly behind the knee. He has full strength. DP and PT pulses are triphasic. Right extremity with Triphasic PT and Biphasic DP      LABS:    CBC  Recent Labs     10/07/20  1318   WBC 10.6   HGB 8.3*   HCT 26.9*   *     BMP  Recent Labs     10/07/20  1318      K 3.8   CL 99   CO2 23   BUN 8   CREATININE 0.8   CALCIUM 8.5*     Liver Function  Recent Labs     10/07/20  1318   BILITOT <0.2   AST 12   ALT 13   ALKPHOS 171*   PROT 6.0*   LABALBU 2.6*     No results for input(s): LACTATE in the last 72 hours. Recent Labs     10/07/20  1318   INR 1.3       RADIOLOGY    Cta Pulmonary W Contrast    Result Date: 10/7/2020  EXAMINATION: CTA OF THE CHEST 10/7/2020 2:48 pm TECHNIQUE: CTA of the chest was performed after the administration of intravenous contrast.  Multiplanar reformatted images are provided for review. MIP images are provided for review. Dose modulation, iterative reconstruction, and/or weight based adjustment of the mA/kV was utilized to reduce the radiation dose to as low as reasonably achievable. COMPARISON: September 17, 2020 HISTORY: ORDERING SYSTEM PROVIDED HISTORY: DVT, tachy, r/o PE TECHNOLOGIST PROVIDED HISTORY: Reason for exam:->DVT, tachy, r/o PE What reading provider will be dictating this exam?->CRC FINDINGS: There is a filling defect present within posterior right lower lobe segmental pulmonary artery. Subtle filling defect is present within lingular segmental pulmonary artery as seen on axial image number 125. No evidence of saddle embolism. The heart is normal in size. No pericardial effusion. There is no mediastinal or hilar lymphadenopathy. There are minimal areas of subsegmental atelectasis in lung bases. There is minimal new left pleural effusion. No evidence of pneumonia. There is no pneumothorax. View of the upper abdomen shows normal bilateral adrenal glands. Postsurgical changes with areas of subcutaneous emphysema are associated with subxiphoid anterior abdominal wall. Postsurgical changes are also present within upper abdomen below margin of the liver. 1.  Small filling defect is located within right lower lobe segmental pulmonary artery and small filling defect is present within lingular segmental pulmonary artery. These findings are related to chronic bilateral pulmonary emboli. [place] of pulmonary embolus has decreased compared to prior from 2020. 2.  New minimal left pleural effusion. 3. Foci of gas are associated with the subxiphoid abdominal wall as well as areas of inflammation located in right upper abdomen below margin of liver. Clinical correlation recommended for recent surgery. Critical results were called by Dr. Aubree Lock to [facility] [employer] on 10/7/2020 at 15:06. Us Dup Lower Extremity Left Jorge    Result Date: 10/7/2020  Patient MRN:  [id number] : 1975 Age: 53 years Gender: Male Order Date:  10/7/2020 2:16 PM EXAM: US DUP LOWER EXTREMITY LEFT JORGE NUMBER OF IMAGES:  25 INDICATION:  leg pain, swelling, r/o DVT leg pain, swelling, r/o DVT What reading provider will be dictating this exam?->[facility] COMPARISON: None There is evidence for deep venous thrombosis, which is occlusive in the left iliac common femoral and profunda superficial femoral and tibial peroneal trunk and the anterior and posterior tibial veins There is otherwise good compressibility, there is good augmentation, there is good color flow.      There is evidence for a large deep venous thrombosis involving the entire left leg from the left iliac to the level of the trifurcation trifurcation veins below the knee ALERT:  THIS IS AN ABNORMAL REPORT         ASSESSMENT:  53 y.o. male with superficial wound skin dehiscence    PLAN:    -No leukocytosis, afebrile  -Daily wound dressing changes, packing with iodoform and dressing with gauze and tape.  This will heal by secondary intention  -This can be managed by nursing staff  -Feel free to call with questions or concerns    Plan discussed with Dr. Brigida Del Rosario    Electronically signed by Jeri Reilly DO on 10/7/20 at 5:59 PM EDT

## 2020-10-07 NOTE — ED NOTES
Bed: [place] [address].  Expected date:   Expected time:   Means of arrival:   Comments:  CHANEL Hester RN  10/07/20 6542

## 2020-10-07 NOTE — CONSULTS
Vascular SURGERY  CONSULT NOTE  10/7/2020     Physician Consulted: Dr. Harolyn Bernheim covering for Dr. Rina Mar  Reason for Consult: Left Leg DVT  Referring Physician: Dr. Tom BYRD  Fred Yanez II is a 53 y.o. male who presents for evaluation of left leg pain. This started approximately one week ago and has gotten progressively worse. His leg has been progressively swelling more over this time as well. The pain is mostly localized to behind the knee and into the left groin. He cannot walk due to the pain in his left leg. He is taking Lovenox injections for anticoagulation due to history of of PE, splenic thrombosis. The patient also has a midline incision from recent ex lap with Dr. Coombs Estimable with with cholecystectomy and right hemicolectomy and small bowel resection with side to side anastomosis, splenectomy and omectomy. There is a small amount of drainage from the inferior portion. There is no redness or swelling. It is minimally tender.      Pertinent history     The patient was recently hospitalized on June 2nd after he was SOB and found to have bilateral PE. 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 He was treated in [place] and discharged on a blood thinner, he cannot recall the exact one. He was found to significant thrombocytosis and was seen by hematology.     He presented to the ED on 7/31 and found to have an abscess of the spleen. He was treated conservatively and discharged to [facility].     The patient was having blood per rectum and on 9/4 underwent EGD which showed mild gastritis and 9/5 a coloscopy showed area of abnormal tissue around ileocecal valve. His blood thinners were held, and an IVC filter was placed.     He presented to the hospital on 9/11 for abdominal pain.  He was found to have intraabdominal abscess and underwent exploratory laparotomy with cholecystectomy and right hemicolectomy and small bowel resection with side to side anastomosis, splenectomy and  SHOULDER ARTHROSCOPY Left 11 21 14    SHOULDER SURGERY   2001 &2007     RIGHT AND LEFT repair of tears    TOTAL HIP ARTHROPLASTY Right 10/31/2016     Total right hip  iRvera Lindsay MD    UPPER GASTROINTESTINAL ENDOSCOPY N/A 9/4/2020     EGD DIAGNOSTIC ONLY performed by Aubrey Adams MD at [facility]   2007     LEFT WRIST           Medications Prior to Admission:    Home Medications           Prior to Admission medications    Medication Sig Start Date End Date Taking?  Authorizing Provider   cholestyramine (QUESTRAN) 4 g packet Take 1 packet by mouth 2 times daily 9/26/20     Charanjit Ross MD   glucose (GLUTOSE) 40 % GEL Take 37.5 mLs by mouth as needed (low sugar) 9/26/20     Charanjit Ross MD   insulin glargine (LANTUS) 100 UNIT/ML injection vial Inject 25 Units into the skin Daily 9/27/20     Charanjit Ross MD   enoxaparin (LOVENOX) 40 MG/0.4ML injection Inject 0.4 mLs into the skin daily 9/27/20     Charanjit Ross MD   metoprolol tartrate (LOPRESSOR) 25 MG tablet Take 1 tablet by mouth 2 times daily 9/11/20     Nani O MD Meche   melatonin 3 MG TABS tablet Take 3 mg by mouth nightly as needed (sleep)       Historical Provider, MD   fluticasone (FLONASE) 50 MCG/ACT nasal spray 2 sprays by Nasal route daily       Historical Provider, MD   cyclobenzaprine (FLEXERIL) 10 MG tablet Take 10 mg by mouth three times daily       Historical Provider, MD   fenofibrate (TRICOR) 54 MG tablet Take 54 mg by mouth daily       Historical Provider, MD   ferrous sulfate (IRON 325) 325 (65 Fe) MG tablet Take 325 mg by mouth 2 times daily       Historical Provider, MD   hydroxyurea (HYDREA) 500 MG chemo capsule Take 500 mg by mouth 2 times daily       Historical Provider, MD   loperamide (IMODIUM) 2 MG capsule Take 2 mg by mouth 4 times daily as needed for Diarrhea       Historical Provider, MD   insulin lispro (HUMALOG) 100 UNIT/ML injection vial Inject 3 Units into the skin 3 times acute distress  Skin:  Skin color, texture, turgor normal. No rashes or lesions. Head/face:  NCAT  Eyes:  No gross abnormalities. and Sclera nonicteric  Lungs:  Normal expansion. Clear to auscultation. No acute respiratory distress  Heart:  Tachycardia, regular rythym  Abdomen:  Midline incision with minor skin dehiscence above the umbilicus. This is 1.5cm L X 1cm W X 1.5cm D. There is serous drainage, a fibrinous base. No surrounding erythema or cellulitis. Drainage is non purulent. Extremities: Left extremity is significantly swollen from groin to foot. It is TTP particularly behind the knee. He has full strength and ROM. DP and PT pulses are triphasic on doppler. Right extremity with Triphasic PT and Biphasic DP.        LABS:     CBC      Recent Labs     10/07/20  1318   WBC 10.6   HGB 8.3*   HCT 26.9*   *      BMP      Recent Labs     10/07/20  1318      K 3.8   CL 99   CO2 23   BUN 8   CREATININE 0.8   CALCIUM 8.5*      Liver Function      Recent Labs     10/07/20  1318   BILITOT <0.2   AST 12   ALT 13   ALKPHOS 171*   PROT 6.0*   LABALBU 2.6*      No results for input(s): LACTATE in the last 72 hours. Recent Labs     10/07/20  1318   INR 1.3         RADIOLOGY     Cta Pulmonary W Contrast     Result Date: 10/7/2020  EXAMINATION: CTA OF THE CHEST 10/7/2020 2:48 pm TECHNIQUE: CTA of the chest was performed after the administration of intravenous contrast.  Multiplanar reformatted images are provided for review. MIP images are provided for review. Dose modulation, iterative reconstruction, and/or weight based adjustment of the mA/kV was utilized to reduce the radiation dose to as low as reasonably achievable.  COMPARISON: September 17, 2020 HISTORY: ORDERING SYSTEM PROVIDED HISTORY: DVT, tachy, r/o PE TECHNOLOGIST PROVIDED HISTORY: Reason for exam:->DVT, tachy, r/o PE What reading provider will be dictating this exam?->CRC FINDINGS: There is a filling defect present within posterior right lower lobe segmental pulmonary artery. Subtle filling defect is present within lingular segmental pulmonary artery as seen on axial image number 125. No evidence of saddle embolism. The heart is normal in size. No pericardial effusion. There is no mediastinal or hilar lymphadenopathy. There are minimal areas of subsegmental atelectasis in lung bases. There is minimal new left pleural effusion. No evidence of pneumonia. There is no pneumothorax. View of the upper abdomen shows normal bilateral adrenal glands. Postsurgical changes with areas of subcutaneous emphysema are associated with subxiphoid anterior abdominal wall. Postsurgical changes are also present within upper abdomen below margin of the liver.      1. Small filling defect is located within right lower lobe segmental pulmonary artery and small filling defect is present within lingular segmental pulmonary artery. These findings are related to chronic bilateral pulmonary emboli. Morris of pulmonary embolus has decreased compared to prior from 2020. 2.  New minimal left pleural effusion. 3. Foci of gas are associated with the subxiphoid abdominal wall as well as areas of inflammation located in right upper abdomen below margin of liver. Clinical correlation recommended for recent surgery.  Critical results were called by Dr. Aubree Lock to JOHN "[employer] [facility] on 10/7/2020 at 15:06.      Us Dup Lower Extremity Left Jorge     Result Date: 10/7/2020  Patient MRN:  [id number] : 1975 Age: 53 years Gender: Male Order Date:  10/7/2020 2:16 PM EXAM: US DUP LOWER EXTREMITY LEFT JORGE NUMBER OF IMAGES:  25 INDICATION:  leg pain, swelling, r/o DVT leg pain, swelling, r/o DVT What reading provider will be dictating this exam?->[facility] COMPARISON: None There is evidence for deep venous thrombosis, which is occlusive in the left iliac common femoral and profunda superficial femoral and tibial peroneal trunk and the anterior and posterior tibial

## 2020-10-07 NOTE — ED PROVIDER NOTES
Department of Emergency Medicine   ED  Provider Note  Admit Date/RoomTime: 10/7/2020 12:37 PM  ED Room: [facility]          History of Present Illness:  10/7/20, Time: 12:46 PM EDT  Chief Complaint   Patient presents with    Leg Pain     had his spleen removed 2 weeks ago, received Lovenox injections daily at the [facility]. Having left groin and leg pain. Sent in to r/o DVT                Hudson Monet is a 53 y.o. male presenting to the ED for left leg swelling and concerns for DVT, beginning a few days ago. The complaint has been constant, moderate in severity, and worsened by nothing. Presents for evaluation of left leg swelling. Patient was recently mid to the hospital and had a splenectomy as well as cholecystectomy and partial bowel resection. He reports that since leaving the hospital he started to have swelling in his left leg. He reports is more swollen and painful. He was sent here from the nursing facility to have an ultrasound to look for deep venous thrombosis. His symptoms are worse today. He denies numbness or tingling. He denies chest pain or shortness of breath but is tachycardic on arrival.    Review of Systems:   Pertinent positives and negatives are stated within HPI, all other systems reviewed and are negative.        --------------------------------------------- PAST HISTORY ---------------------------------------------  Past Medical History:  has a past medical history of Accident, SIMÓN (acute kidney injury) ([facility] [address] Allergic rhinitis, Chronic back pain, Depression, Diabetes mellitus ([facility] [address] Difficulty sleeping, Displacement of lumbar intervertebral disc without myelopathy, Fibromyalgia, Fractured rib, H/O seasonal allergies, Head injury, Hyperlipidemia, Hypertension, Obesity (BMI 35.0-39.9 without comorbidity), Osteoarthritis, and Thoracic or lumbosacral neuritis or radiculitis, unspecified.     Past Surgical History:  has a past surgical history that includes Neck surgery (2000); shoulder surgery (2001 &2007); Wrist surgery (2007); Rotator cuff repair (Left, 2014); hernia repair (2001); Shoulder arthroscopy (Left, 11 21 14); Vasectomy; Hip Arthroplasty (Left, 4/11/2016); Total hip arthroplasty (Right, 10/31/2016); Foot surgery (Right, 1985); pr colonoscopy flx dx w/collj spec when pfrmd (N/A, 5/7/2018); hernia repair (Right, 12/9/2019); CT PTC NEW ACCESS (8/3/2020); Upper gastrointestinal endoscopy (N/A, 9/4/2020); Colonoscopy (N/A, 9/5/2020); and laparotomy (N/A, 9/18/2020). Social History:  reports that he has never smoked. His smokeless tobacco use includes chew. He reports previous alcohol use. He reports that he does not use drugs. Family History: family history includes Arthritis in his father; Cancer in his maternal grandfather and paternal uncle; Diabetes in his father and paternal grandfather; Heart Disease in his maternal grandmother; Hypertension in his mother; Stroke in his maternal aunt. . Unless otherwise noted, family history is non contributory    The patients home medications have been reviewed. Allergies: Seasonal and Tramadol    I have reviewed the past medical history, past surgical history, social history, and family history    ---------------------------------------------------PHYSICAL EXAM--------------------------------------    Constitutional/General: Alert and oriented x3  Head: Normocephalic and atraumatic  Eyes: PERRL, EOMI, sclera non icteric  Mouth: Oropharynx clear, handling secretions  Neck: Supple, full ROM, no stridor, no meningeal signs  Respiratory: Lungs clear to auscultation bilaterally, no wheezes, rales, or rhonchi. Not in respiratory distress  Cardiovascular: Tachycardic but regular rhythm. No murmurs, no aortic murmurs, no gallops, no rubs. 2+ distal pulses. Equal extremity pulses. Gastrointestinal:  Abdomen Soft, Non tender, Non distended. No rebound, guarding, or rigidity. No pulsatile masses. Musculoskeletal: Moves all extremities x 4. Warm and well perfused, no clubbing, no cyanosis. Left lower extremity with swelling compared to the right. There is 1+ pitting edema on the left lower extremity. There is calf swelling and fullness as well as fullness behind the popliteal fossa. He has palpable distal pulses DP and PT. Neurovascularly intact distally. 2+ DP/PT pulses. Capillary refill <3 secondary. Warm and well perfused. Sural, saphenous, deep peroneal, peroneal, and tibial nerves intact. Sensation intact. 5/5 strength. Achilies tendon intact. Leg is swollen but no evidence of compartment syndrome. Skin: skin warm and dry. No rashes. Neurologic: GCS 15, no focal deficits      -------------------------------------------------- RESULTS -------------------------------------------------  I have personally reviewed all laboratory and imaging results for this patient. Results are listed below.      LABS: (Lab results interpreted by me)  Results for orders placed or performed during the hospital encounter of 10/07/20   CBC Auto Differential   Result Value Ref Range    WBC 10.6 4.5 - 11.5 E9/L    RBC 3.03 (L) 3.80 - 5.80 E12/L    Hemoglobin 8.3 (L) 12.5 - 16.5 g/dL    Hematocrit 26.9 (L) 37.0 - 54.0 %    MCV 88.8 80.0 - 99.9 fL    MCH 27.4 26.0 - 35.0 pg    MCHC 30.9 (L) 32.0 - 34.5 %    RDW 17.9 (H) 11.5 - 15.0 fL    Platelets 772 (H) 638 - 450 E9/L    MPV 8.4 7.0 - 12.0 fL    Neutrophils % 71.1 43.0 - 80.0 %    Immature Granulocytes % 0.5 0.0 - 5.0 %    Lymphocytes % 17.2 (L) 20.0 - 42.0 %    Monocytes % 9.5 2.0 - 12.0 %    Eosinophils % 1.5 0.0 - 6.0 %    Basophils % 0.2 0.0 - 2.0 %    Neutrophils Absolute 7.52 (H) 1.80 - 7.30 E9/L    Immature Granulocytes # 0.05 E9/L    Lymphocytes Absolute 1.82 1.50 - 4.00 E9/L    Monocytes Absolute 1.01 (H) 0.10 - 0.95 E9/L    Eosinophils Absolute 0.16 0.05 - 0.50 E9/L    Basophils Absolute 0.02 0.00 - 0.20 E9/L   Comprehensive Metabolic Panel w/ Reflex to MG   Result Value Ref Range    Sodium 136 132 - 146 to [facility] on   10/7/2020 at 15:06.         US DUP LOWER EXTREMITY LEFT JORGE   Final Result   There is evidence for a large deep venous thrombosis involving the   entire left leg from the left iliac to the level of the trifurcation   trifurcation veins below the knee      ALERT:  THIS IS AN ABNORMAL REPORT                     EKG Interpretation  Interpreted by emergency department physician, Dr. Dewayne Koch     Date of EKG: 10/7/20  Time: 1311    Rhythm: sinus tachycardia  Rate: tachycardia  Axis: normal  Conduction: normal  ST Segments: no acute change  T Waves: no acute change    Clinical Impression: sinus tachycardia, no acute ischemic changes  Comparison to prior EKG: stable as compared to patient's most recent EKG      ------------------------- NURSING NOTES AND VITALS REVIEWED ---------------------------   The nursing notes within the ED encounter and vital signs as below have been reviewed by myself  /75   Pulse 100   Temp 97 °F (36.1 °C) (Temporal)   Resp 20   Ht 6' 1\" (1.854 m)   Wt 245 lb (111.1 kg)   SpO2 94%   BMI 32.32 kg/m²     Oxygen Saturation Interpretation: Normal    The patients available past medical records and past encounters were reviewed.         ------------------------------ ED COURSE/MEDICAL DECISION MAKING----------------------  Medications   heparin (porcine) injection 8,890 Units (has no administration in time range)   heparin (porcine) injection 4,440 Units (has no administration in time range)   heparin 25,000 units in dextrose 5% 250 mL infusion (18 Units/kg/hr × 111.1 kg Intravenous New Bag 10/7/20 1602)   sodium chloride flush 0.9 % injection 10 mL (has no administration in time range)   sodium chloride flush 0.9 % injection 10 mL (10 mLs Intravenous Given 10/7/20 1547)   HYDROcodone-acetaminophen (NORCO) 5-325 MG per tablet 2 tablet (2 tablets Oral Given 10/7/20 1330)   heparin (porcine) injection 8,890 Units (8,890 Units Intravenous Given 10/7/20 0800)   iopamidol DISPOSITION  Disposition: Admit to telemetry  Patient condition is stable        NOTE: This report was transcribed using voice recognition software.  Every effort was made to ensure accuracy; however, inadvertent computerized transcription errors may be present     Elba Barnard MD  10/07/20 [id number]

## 2020-10-08 LAB
ANION GAP SERPL CALCULATED.3IONS-SCNC: 13 MMOL/L (ref 7–16)
APTT: 33 SEC (ref 24.5–35.1)
APTT: 44.7 SEC (ref 24.5–35.1)
APTT: 55.9 SEC (ref 24.5–35.1)
BUN BLDV-MCNC: 10 MG/DL (ref 6–20)
CALCIUM SERPL-MCNC: 8.5 MG/DL (ref 8.6–10.2)
CHLORIDE BLD-SCNC: 100 MMOL/L (ref 98–107)
CO2: 25 MMOL/L (ref 22–29)
CREAT SERPL-MCNC: 0.8 MG/DL (ref 0.7–1.2)
FIBRINOGEN: >700 MG/DL (ref 225–540)
GFR AFRICAN AMERICAN: >60
GFR NON-AFRICAN AMERICAN: >60 ML/MIN/1.73
GLUCOSE BLD-MCNC: 111 MG/DL (ref 74–99)
HCT VFR BLD CALC: 24.7 % (ref 37–54)
HCT VFR BLD CALC: 25.6 % (ref 37–54)
HEMOGLOBIN: 7.7 G/DL (ref 12.5–16.5)
HEMOGLOBIN: 7.8 G/DL (ref 12.5–16.5)
MAGNESIUM: 1.6 MG/DL (ref 1.6–2.6)
MCH RBC QN AUTO: 27.1 PG (ref 26–35)
MCH RBC QN AUTO: 27.2 PG (ref 26–35)
MCHC RBC AUTO-ENTMCNC: 30.5 % (ref 32–34.5)
MCHC RBC AUTO-ENTMCNC: 31.2 % (ref 32–34.5)
MCV RBC AUTO: 87.3 FL (ref 80–99.9)
MCV RBC AUTO: 88.9 FL (ref 80–99.9)
METER GLUCOSE: 102 MG/DL (ref 74–99)
METER GLUCOSE: 87 MG/DL (ref 74–99)
METER GLUCOSE: 87 MG/DL (ref 74–99)
METER GLUCOSE: 91 MG/DL (ref 74–99)
METER GLUCOSE: 94 MG/DL (ref 74–99)
PDW BLD-RTO: 17.8 FL (ref 11.5–15)
PDW BLD-RTO: 18 FL (ref 11.5–15)
PLATELET # BLD: 750 E9/L (ref 130–450)
PLATELET # BLD: 784 E9/L (ref 130–450)
PMV BLD AUTO: 8.6 FL (ref 7–12)
PMV BLD AUTO: 8.8 FL (ref 7–12)
POTASSIUM REFLEX MAGNESIUM: 3.5 MMOL/L (ref 3.5–5)
RBC # BLD: 2.83 E12/L (ref 3.8–5.8)
RBC # BLD: 2.88 E12/L (ref 3.8–5.8)
SODIUM BLD-SCNC: 138 MMOL/L (ref 132–146)
WBC # BLD: 12.4 E9/L (ref 4.5–11.5)
WBC # BLD: 16.8 E9/L (ref 4.5–11.5)

## 2020-10-08 PROCEDURE — 36010 PLACE CATHETER IN VEIN: CPT | Performed by: SURGERY

## 2020-10-08 PROCEDURE — 85730 THROMBOPLASTIN TIME PARTIAL: CPT

## 2020-10-08 PROCEDURE — 6360000002 HC RX W HCPCS

## 2020-10-08 PROCEDURE — 76499 UNLISTED DX RADIOGRAPHIC PX: CPT | Performed by: SURGERY

## 2020-10-08 PROCEDURE — 6370000000 HC RX 637 (ALT 250 FOR IP): Performed by: SURGERY

## 2020-10-08 PROCEDURE — 2500000003 HC RX 250 WO HCPCS

## 2020-10-08 PROCEDURE — 75820 VEIN X-RAY ARM/LEG: CPT | Performed by: SURGERY

## 2020-10-08 PROCEDURE — 2000000000 HC ICU R&B

## 2020-10-08 PROCEDURE — 2709999900 HC NON-CHARGEABLE SUPPLY

## 2020-10-08 PROCEDURE — 6360000002 HC RX W HCPCS: Performed by: SURGERY

## 2020-10-08 PROCEDURE — 80048 BASIC METABOLIC PNL TOTAL CA: CPT

## 2020-10-08 PROCEDURE — 06H03DZ INSERTION OF INTRALUMINAL DEVICE INTO INFERIOR VENA CAVA, PERCUTANEOUS APPROACH: ICD-10-PCS | Performed by: SURGERY

## 2020-10-08 PROCEDURE — 3E03317 INTRODUCTION OF OTHER THROMBOLYTIC INTO PERIPHERAL VEIN, PERCUTANEOUS APPROACH: ICD-10-PCS | Performed by: SURGERY

## 2020-10-08 PROCEDURE — 37212 THROMBOLYTIC VENOUS THERAPY: CPT | Performed by: SURGERY

## 2020-10-08 PROCEDURE — 6370000000 HC RX 637 (ALT 250 FOR IP): Performed by: HOSPITALIST

## 2020-10-08 PROCEDURE — 85384 FIBRINOGEN ACTIVITY: CPT

## 2020-10-08 PROCEDURE — C1751 CATH, INF, PER/CENT/MIDLINE: HCPCS

## 2020-10-08 PROCEDURE — 2580000003 HC RX 258: Performed by: SURGERY

## 2020-10-08 PROCEDURE — 82962 GLUCOSE BLOOD TEST: CPT

## 2020-10-08 PROCEDURE — 36415 COLL VENOUS BLD VENIPUNCTURE: CPT

## 2020-10-08 PROCEDURE — 83735 ASSAY OF MAGNESIUM: CPT

## 2020-10-08 PROCEDURE — 75825 VEIN X-RAY TRUNK: CPT | Performed by: SURGERY

## 2020-10-08 PROCEDURE — C1887 CATHETER, GUIDING: HCPCS

## 2020-10-08 PROCEDURE — C1894 INTRO/SHEATH, NON-LASER: HCPCS

## 2020-10-08 PROCEDURE — C1769 GUIDE WIRE: HCPCS

## 2020-10-08 PROCEDURE — 6360000002 HC RX W HCPCS: Performed by: EMERGENCY MEDICINE

## 2020-10-08 PROCEDURE — 6360000002 HC RX W HCPCS: Performed by: INTERNAL MEDICINE

## 2020-10-08 PROCEDURE — 85027 COMPLETE CBC AUTOMATED: CPT

## 2020-10-08 RX ORDER — SODIUM CHLORIDE 0.9 % (FLUSH) 0.9 %
10 SYRINGE (ML) INJECTION PRN
Status: DISCONTINUED | OUTPATIENT
Start: 2020-10-08 | End: 2020-10-15 | Stop reason: HOSPADM

## 2020-10-08 RX ORDER — ACETAMINOPHEN 325 MG/1
650 TABLET ORAL EVERY 4 HOURS PRN
Status: DISCONTINUED | OUTPATIENT
Start: 2020-10-08 | End: 2020-10-15 | Stop reason: HOSPADM

## 2020-10-08 RX ORDER — ONDANSETRON 2 MG/ML
4 INJECTION INTRAMUSCULAR; INTRAVENOUS EVERY 6 HOURS PRN
Status: DISCONTINUED | OUTPATIENT
Start: 2020-10-08 | End: 2020-10-15 | Stop reason: HOSPADM

## 2020-10-08 RX ORDER — SODIUM CHLORIDE 9 MG/ML
INJECTION, SOLUTION INTRAVENOUS CONTINUOUS PRN
Status: ACTIVE | OUTPATIENT
Start: 2020-10-08 | End: 2020-10-09

## 2020-10-08 RX ORDER — SODIUM CHLORIDE 9 MG/ML
INJECTION, SOLUTION INTRAVENOUS CONTINUOUS
Status: DISCONTINUED | OUTPATIENT
Start: 2020-10-08 | End: 2020-10-11

## 2020-10-08 RX ORDER — SODIUM CHLORIDE 0.9 % (FLUSH) 0.9 %
10 SYRINGE (ML) INJECTION EVERY 12 HOURS SCHEDULED
Status: DISCONTINUED | OUTPATIENT
Start: 2020-10-08 | End: 2020-10-15 | Stop reason: HOSPADM

## 2020-10-08 RX ORDER — HEPARIN SODIUM 10000 [USP'U]/100ML
500 INJECTION, SOLUTION INTRAVENOUS CONTINUOUS
Status: DISCONTINUED | OUTPATIENT
Start: 2020-10-08 | End: 2020-10-11

## 2020-10-08 RX ADMIN — METOPROLOL TARTRATE 25 MG: 25 TABLET, FILM COATED ORAL at 20:38

## 2020-10-08 RX ADMIN — CHOLESTYRAMINE 4 G: 4 POWDER, FOR SUSPENSION ORAL at 22:01

## 2020-10-08 RX ADMIN — PRAVASTATIN SODIUM 20 MG: 20 TABLET ORAL at 22:02

## 2020-10-08 RX ADMIN — HEPARIN SODIUM 4400 UNITS: 1000 INJECTION INTRAVENOUS; SUBCUTANEOUS at 06:02

## 2020-10-08 RX ADMIN — HEPARIN SODIUM AND DEXTROSE 17 UNITS/KG/HR: 10000; 5 INJECTION INTRAVENOUS at 06:01

## 2020-10-08 RX ADMIN — FERROUS SULFATE TAB 325 MG (65 MG ELEMENTAL FE) 325 MG: 325 (65 FE) TAB at 20:38

## 2020-10-08 RX ADMIN — Medication 2 G: at 14:45

## 2020-10-08 RX ADMIN — Medication 2 G: at 23:13

## 2020-10-08 RX ADMIN — FERROUS SULFATE TAB 325 MG (65 MG ELEMENTAL FE) 325 MG: 325 (65 FE) TAB at 09:21

## 2020-10-08 RX ADMIN — HEPARIN SODIUM AND DEXTROSE 17 UNITS/KG/HR: 10000; 5 INJECTION INTRAVENOUS at 07:46

## 2020-10-08 RX ADMIN — METOPROLOL TARTRATE 25 MG: 25 TABLET, FILM COATED ORAL at 09:21

## 2020-10-08 RX ADMIN — INSULIN GLARGINE 25 UNITS: 100 INJECTION, SOLUTION SUBCUTANEOUS at 06:03

## 2020-10-08 RX ADMIN — SODIUM CHLORIDE: 9 INJECTION, SOLUTION INTRAVENOUS at 15:25

## 2020-10-08 RX ADMIN — CYCLOBENZAPRINE 10 MG: 10 TABLET, FILM COATED ORAL at 08:06

## 2020-10-08 RX ADMIN — FENOFIBRATE 54 MG: 54 TABLET ORAL at 09:20

## 2020-10-08 RX ADMIN — HYDROXYUREA 500 MG: 500 CAPSULE ORAL at 09:19

## 2020-10-08 RX ADMIN — ALTEPLASE 1 MG/HR: 2.2 INJECTION, POWDER, LYOPHILIZED, FOR SOLUTION INTRAVENOUS at 15:25

## 2020-10-08 RX ADMIN — HYDROMORPHONE HYDROCHLORIDE 0.5 MG: 1 INJECTION, SOLUTION INTRAMUSCULAR; INTRAVENOUS; SUBCUTANEOUS at 18:15

## 2020-10-08 RX ADMIN — HEPARIN SODIUM AND DEXTROSE 500 UNITS/HR: 10000; 5 INJECTION INTRAVENOUS at 15:26

## 2020-10-08 RX ADMIN — HYDROMORPHONE HYDROCHLORIDE 0.5 MG: 1 INJECTION, SOLUTION INTRAMUSCULAR; INTRAVENOUS; SUBCUTANEOUS at 20:17

## 2020-10-08 RX ADMIN — HYDROMORPHONE HYDROCHLORIDE 0.5 MG: 1 INJECTION, SOLUTION INTRAMUSCULAR; INTRAVENOUS; SUBCUTANEOUS at 22:23

## 2020-10-08 RX ADMIN — CYCLOBENZAPRINE 10 MG: 10 TABLET, FILM COATED ORAL at 22:02

## 2020-10-08 RX ADMIN — HYDROMORPHONE HYDROCHLORIDE 0.5 MG: 1 INJECTION, SOLUTION INTRAMUSCULAR; INTRAVENOUS; SUBCUTANEOUS at 16:14

## 2020-10-08 RX ADMIN — ALTEPLASE 1 MG/HR: 2.2 INJECTION, POWDER, LYOPHILIZED, FOR SOLUTION INTRAVENOUS at 22:23

## 2020-10-08 RX ADMIN — HYDROXYUREA 500 MG: 500 CAPSULE ORAL at 22:01

## 2020-10-08 RX ADMIN — HYDROMORPHONE HYDROCHLORIDE 1 MG: 1 INJECTION, SOLUTION INTRAMUSCULAR; INTRAVENOUS; SUBCUTANEOUS at 12:25

## 2020-10-08 RX ADMIN — DULOXETINE HYDROCHLORIDE 30 MG: 30 CAPSULE, DELAYED RELEASE ORAL at 09:20

## 2020-10-08 RX ADMIN — Medication 10 ML: at 20:17

## 2020-10-08 RX ADMIN — PANTOPRAZOLE SODIUM 40 MG: 40 TABLET, DELAYED RELEASE ORAL at 09:21

## 2020-10-08 ASSESSMENT — PAIN DESCRIPTION - PAIN TYPE
TYPE: ACUTE PAIN
TYPE: ACUTE PAIN;CHRONIC PAIN
TYPE: ACUTE PAIN;CHRONIC PAIN

## 2020-10-08 ASSESSMENT — PAIN DESCRIPTION - ONSET
ONSET: ON-GOING

## 2020-10-08 ASSESSMENT — PAIN DESCRIPTION - LOCATION: LOCATION: LEG;SACRUM

## 2020-10-08 ASSESSMENT — PAIN DESCRIPTION - DESCRIPTORS: DESCRIPTORS: ACHING;CRAMPING

## 2020-10-08 ASSESSMENT — PAIN SCALES - GENERAL
PAINLEVEL_OUTOF10: 7
PAINLEVEL_OUTOF10: 10
PAINLEVEL_OUTOF10: 8
PAINLEVEL_OUTOF10: 3

## 2020-10-08 ASSESSMENT — PAIN DESCRIPTION - FREQUENCY
FREQUENCY: CONTINUOUS

## 2020-10-08 ASSESSMENT — PAIN - FUNCTIONAL ASSESSMENT
PAIN_FUNCTIONAL_ASSESSMENT: PREVENTS OR INTERFERES WITH MANY ACTIVE NOT PASSIVE ACTIVITIES
PAIN_FUNCTIONAL_ASSESSMENT: ACTIVITIES ARE NOT PREVENTED

## 2020-10-08 ASSESSMENT — PAIN DESCRIPTION - PROGRESSION
CLINICAL_PROGRESSION: GRADUALLY WORSENING
CLINICAL_PROGRESSION: GRADUALLY WORSENING

## 2020-10-08 ASSESSMENT — PAIN DESCRIPTION - ORIENTATION: ORIENTATION: LEFT

## 2020-10-08 NOTE — PROGRESS NOTES
CVICU Admission Note    Name: Mary Bustos  MRN: [id number]    CC: Postoperative Critical Care Management     Indication for Surgery/Procedure: Left lower extremity DVT    Important/Relevant PMH/PSH:  Recent abdominal infection, underwent ex lap with Dr. Cedric Reed with with cholecystectomy and right hemicolectomy and small bowel resection with side to side anastomosis, splenectomy and omectomy was discharged to nursing home on lovenox. DMII, HLD, Bilateral PE with IVC filter, HTN, GI bleed, right total knee arthroplasty     Procedure/Surgeries:   10/8/2020 placement of Ekos lysis catheter       Physical Exam:    /76   Pulse 118   Temp 98.3 °F (36.8 °C) (Temporal)   Resp 18   Ht 6' 1\" (1.854 m)   Wt 245 lb (111.1 kg)   SpO2 96%   BMI 32.32 kg/m²     Recent Labs     10/08/20  0315   WBC 12.4*   RBC 2.88*   HGB 7.8*   HCT 25.6*   MCV 88.9   MCH 27.1   MCHC 30.5*   RDW 18.0*   *   MPV 8.6     Recent Labs     10/08/20  0315      K 3.5      CO2 25   BUN 10   CREATININE 0.8   GLUCOSE 111*   CALCIUM 8.5*       General Appearance: Arrived to ICU in stable condition, lysis catheter on  Eyes: PERRL  Pulmonary: CTA bilaterally. No wheezes, no accessory muscle use noted on room air   Cardiovascular: RRR, no heaves or thrills palpated   Telemetry: ST  Abdomen: Soft, Nontender, abdominal incision with dressing intact   Extremities: LLE +edema, palpable DP pulses, +PT signals, RLE with palpable DP/PT pulses, bilateral feet with old scars   Neurologic/Psych: Alert and oriented x3, following commands   Skin: Warm and dry   Incision: left popliteal catheter site soft, no drainage      Assessment/Plan: Day of Surgery     1.  LLE DVT S/p placement of lysis catheter   - Frequent neurovascular checks, monitor insertion site for signs of bleeding/hematoma    - tPA/heparin infusing via EKOS catheter   - Monitor H/H, APTT, fibrinogen q 6 hours while tPA infusing  - Bedrest  - Ancef while catheters in place  - prn IV dilaudid for pain control   - NPO after midnight with plans to return to [facility] for angiogram tomorrow   - Hematology team following   - Seen by GS for superficial abdominal wound skin dehiscence--Daily wound dressing changes, packing with iodoform and dressing with gauze and tape per nursing       Electronically signed by IGOR Sofia - CNP on 10/8/2020 at 3:47 PM

## 2020-10-08 NOTE — PROGRESS NOTES
Hem/Onc consult called. NP will see the patient first, don't have a covering physician yet per office. Thanks.

## 2020-10-08 NOTE — PROGRESS NOTES
Nurse to nurse eport given to [facility] in CVICU and all pertinent information regarding patient  has been relayed.

## 2020-10-08 NOTE — PROGRESS NOTES
Pt seen and examined    Hx of previous IVC Filter   Hx of previous PE    He was on prophlyactic lovenox at [facility]    Acute onset of L LE swelling and pain 10/1/20    No recent bleeding issues    Discussed with patient concern with recent bleeding issues and re tpa    · L Iliofemoral DVT  · I feel that patient melva  a good candidate for placement of TPA lysis catheters , L LE venogram  · Etiology is likely stasis  · discussed with patient pathophysiology of DVT, PE   · All ?s answered  · I discussed with patient and family members at bedside options of   No intervention - Full dose anticoagulation   Catheter directed lysis of pulmonary embolus with TPA  · they understood risk of bleeding associated with TPA - brain hemorrhagic , GI bleeding  · Pt and family explained risks, benefits, complications, procedure, alternatives, options, and expected outcomes and was agreeable to proceed with lysis catheter placement  · reviewed the procedure with the patient and family as available. I discussed the procedure, risks, benefits, complications, and alternatives of the procedure. They understand and consent.   All questions were answered    Asad Hayes

## 2020-10-08 NOTE — CONSULTS
[facility]  Hematology/Oncology  Consult      Patient Name: Greg Salinas II  YOB: 1975  PCP: IGOR Molina CNP   Referring Provider:      Reason for Consultation:   Chief Complaint   Patient presents with    Leg Pain     had his spleen removed 2 weeks ago, received Lovenox injections daily at the [facility]. Having left groin and leg pain. Sent in to r/o DVT        History of Present Illness: This is a 68-year-old male patient of Dr. Ewelina Ledezma who is being seen for thrombocytosis and moderate normocytic anemia. He also has a past medical history of diabetes mellitus type 2, hyperlipidemia, bilateral PE with IVC filter, hypertension, and GI bleed. 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 His platelets were 1.1 million. His out patient work up showed JAK2/CALR/MPL all to be negative. His B12 and folate were within normal limits. He had elevated ferritin and low serum iron with normal TIBC. He was started on Hydrea 500 mg twice daily as well as an aspirin 81 mg. He [facility] emergency room [facility] the [facility] for left leg swelling. His platelet count was 564 and Hgb 8.3. He had a venous Doppler of the left extremity which showed evidence for a large deep venous thrombosis involving the entire left leg from the left iliac to the level of the trifurcation trifurcation veins below the knee. He was started on a heparin drip and admitted for further evaluation.       Diagnostic Data:     Past Medical History:   Diagnosis Date    Accident 11/2019    stepped on nail rt ft about 1 month ago- healed per patient    SIMÓN (acute kidney injury) ([facility] [address] 2008 apx    kidney bruised due to fall / Iris Blunt    Allergic rhinitis  Chronic back pain     Depression     Diabetes mellitus ([address]     Difficulty sleeping     at times    Displacement of lumbar intervertebral disc without myelopathy     Fibromyalgia     Fractured rib     2008 / healed    H/O seasonal allergies     Head injury 1980'S apx    no residual s/s    Hyperlipidemia     Hypertension     Obesity (BMI 35.0-39.9 without comorbidity)     bmi 39.2  weight 296 #    Osteoarthritis     Thoracic or lumbosacral neuritis or radiculitis, unspecified        Patient Active Problem List    Diagnosis Date Noted    Deep vein thrombosis (DVT) present on admission ([address] 10/07/2020    SIMÓN (acute kidney injury) ([facility] [address] 09/17/2020    Septicemia ([facility] [address] 09/17/2020    Intra-abdominal infection 09/17/2020    Acute GI bleeding 09/03/2020    Acute blood loss anemia     Thrombocytosis ([address] 08/10/2020    Decubitus ulcer of sacral area 08/01/2020    Abnormal findings on diagnostic imaging of gall bladder 08/01/2020    Splenic infarction 08/01/2020    Cyst of spleen 08/01/2020    Splenic abscess 08/01/2020    Anemia 08/01/2020    Other pulmonary embolism without acute cor pulmonale (HCC) 08/01/2020    Enterocolitis 07/31/2020    Rectus diastasis 11/01/2019    Recurrent unilateral inguinal hernia 11/01/2019    Cellulitis of sacral region 07/02/2019    Cellulitis of foot 06/30/2019    Neuropathy 06/30/2019    Cellulitis, wound, post-operative 06/30/2019    Left leg swelling 06/30/2019    SIRS (systemic inflammatory response syndrome) ([address] 06/30/2019    Primary osteoarthritis of right hip 11/03/2016    Primary osteoarthritis of left hip 04/11/2016    Type 2 diabetes mellitus ([facility] [address] 04/08/2016    Lumbar disc herniation 02/22/2016    Lumbar radiculopathy 12/17/2015    Osteoarthritis of spine with radiculopathy, lumbar region 12/17/2015    Class 1 obesity in adult 12/17/2015    Allergic rhinitis 11/23/2015    Essential hypertension 10/16/2015    Vitamin D deficiency 04/14/2015    Fibromyalgia 12/15/2014    Insomnia 07/04/2014    Osteoarthritis 03/27/2014    Lumbar spondylosis 01/07/2014    Neuropathic pain 05/08/2012        Past Surgical History:   Procedure Laterality Date    COLONOSCOPY N/A 9/5/2020    COLONOSCOPY WITH BIOPSY performed by Renee Oconnell MD at [address] PTC NEW ACCESS  8/3/2020    CT PTC NEW ACCESS 8/3/2020 [facility] CT    FOOT SURGERY Right 1985    to treat shattered bones    HERNIA REPAIR  2001    DOUBLE HERNIA    HERNIA REPAIR Right 12/9/2019    LAPAROSCOPIC RIGHT INGUINAL HERNIA REPAIR, MESH 10x15 cm PLACEMENT performed by Julienne Dugan MD at [facility] Left 4/11/2016    LAPAROTOMY N/A 9/18/2020    LAPAROTOMY EXPLORATORY, CHOLECYSTECTOMY, BOWEL RESECTION, right darian colectomy, partial omentectomy, and splenectomy performed by Renee Oconnell MD at [facility] COLONOSCOPY FLX DX W/COLLJ [address] PFRMD N/A 5/7/2018    COLONOSCOPY DIAGNOSTIC performed by Joseph Rosales MD at [place] [id number] Left 2014    [facility] [place] ARTHROSCOPY Left 11 21 14    SHOULDER SURGERY  2001 &2007    RIGHT AND LEFT repair of tears    TOTAL HIP ARTHROPLASTY Right 10/31/2016    Total right hip  Gabrielle Whitney MD    UPPER GASTROINTESTINAL ENDOSCOPY N/A 9/4/2020    EGD DIAGNOSTIC ONLY performed by Rose Hook MD at [facility] 1  2007    LEFT WRIST       Family History  Family History   Problem Relation Age of Onset    Hypertension Mother     Arthritis Father     Diabetes Father     Cancer Maternal Grandfather         Skin    Cancer Paternal Uncle         skin    Diabetes Paternal Grandfather     Heart Disease Maternal Grandmother     Stroke Maternal Aunt        Social History    TOBACCO:   reports that he has never smoked. His smokeless tobacco use includes chew. ETOH:   reports previous alcohol use.     Home Medications  Prior to Admission medications    Medication Sig Start Date End Date Taking?  Authorizing Provider   cholestyramine (QUESTRAN) 4 g packet Take 1 packet by mouth 2 times daily 9/26/20   Celeste Doan MD   glucose (GLUTOSE) 40 % GEL Take 37.5 mLs by mouth as needed (low sugar) 9/26/20   Celeste Doan MD   insulin glargine (LANTUS) 100 UNIT/ML injection vial Inject 25 Units into the skin Daily 9/27/20   Celeste Doan MD   enoxaparin (LOVENOX) 40 MG/0.4ML injection Inject 0.4 mLs into the skin daily 9/27/20   Celeste Doan MD   metoprolol tartrate (LOPRESSOR) 25 MG tablet Take 1 tablet by mouth 2 times daily 9/11/20   Geoffrey Lofton MD   melatonin 3 MG TABS tablet Take 3 mg by mouth nightly as needed (sleep)    Historical Provider, MD   fluticasone (FLONASE) 50 MCG/ACT nasal spray 2 sprays by Nasal route daily    Historical Provider, MD   cyclobenzaprine (FLEXERIL) 10 MG tablet Take 10 mg by mouth three times daily    Historical Provider, MD   fenofibrate (TRICOR) 54 MG tablet Take 54 mg by mouth daily    Historical Provider, MD   ferrous sulfate (IRON 325) 325 (65 Fe) MG tablet Take 325 mg by mouth 2 times daily    Historical Provider, MD   hydroxyurea (HYDREA) 500 MG chemo capsule Take 500 mg by mouth 2 times daily    Historical Provider, MD   loperamide (IMODIUM) 2 MG capsule Take 2 mg by mouth 4 times daily as needed for Diarrhea    Historical Provider, MD   insulin lispro (HUMALOG) 100 UNIT/ML injection vial Inject 3 Units into the skin 3 times daily (before meals) Injects 3 units three times daily before meals in addition to following sliding scale  : 0 units  141-180: 1 unit  181-220: 2 units  221-260: 3 units  261-300: 4 units  301-340: 5 units  >341: 6 units and call MD    Historical Provider, MD   pantoprazole (PROTONIX) 40 MG tablet Take 40 mg by mouth daily    Historical Provider, MD   DULoxetine (CYMBALTA) 30 MG extended release capsule Take 1 capsule by mouth daily 5/14/20   Lexus Diaz, APRN - CNP pravastatin (PRAVACHOL) 20 MG tablet Take 1 tablet by mouth nightly 5/14/20   Laz Leal, APRN - CNP       Allergies  Allergies   Allergen Reactions    Seasonal      HAYFEVER / sneezing,watery eyes    Tramadol Itching       Review of Systems: +LLE edema. Otherwise 12 point ROS negative      Objective  /67   Pulse 98   Temp 98.7 °F (37.1 °C) (Oral)   Resp 16   Ht 6' 1\" (1.854 m)   Wt 245 lb (111.1 kg)   SpO2 96%   BMI 32.32 kg/m²     Physical Exam:   Performance Status:  General: AAO to person, place, time, in no acute distress,   Head and neck : PERRLA, EOMI . Sclera non icteric. Oropharynx : Clear  Neck: no JVD,  no adenopathy, no carotid bruit  Heart: Regular rate and regular rhythm, no murmur  Lungs: Clear to auscultation   Extremities: +LLE edema  Abdomen: Soft, non-tender; +surgical scars  Skin:  No rash  Neurologic:Cranial nerves grossly intact. No focal motor or sensory deficits .     Recent Laboratory Data-   Lab Results   Component Value Date    WBC 12.4 (H) 10/08/2020    HGB 7.8 (L) 10/08/2020    HCT 25.6 (L) 10/08/2020    MCV 88.9 10/08/2020     (H) 10/08/2020    LYMPHOPCT 17.2 (L) 10/07/2020    RBC 2.88 (L) 10/08/2020    MCH 27.1 10/08/2020    MCHC 30.5 (L) 10/08/2020    RDW 18.0 (H) 10/08/2020    NEUTOPHILPCT 71.1 10/07/2020    MONOPCT 9.5 10/07/2020    BASOPCT 0.2 10/07/2020    NEUTROABS 7.52 (H) 10/07/2020    LYMPHSABS 1.82 10/07/2020    MONOSABS 1.01 (H) 10/07/2020    EOSABS 0.16 10/07/2020    BASOSABS 0.02 10/07/2020       Lab Results   Component Value Date     10/08/2020    K 3.5 10/08/2020     10/08/2020    CO2 25 10/08/2020    BUN 10 10/08/2020    CREATININE 0.8 10/08/2020    GLUCOSE 111 (H) 10/08/2020    CALCIUM 8.5 (L) 10/08/2020    PROT 6.0 (L) 10/07/2020    LABALBU 2.6 (L) 10/07/2020    BILITOT <0.2 10/07/2020    ALKPHOS 171 (H) 10/07/2020    AST 12 10/07/2020    ALT 13 10/07/2020    LABGLOM >60 10/08/2020    GFRAA >60 10/08/2020       Lab Results Component Value Date    IRON 24 (L) 2020    TIBC 145 (L) 2020    FERRITIN 233 2020           Radiology-    Ct Abdomen Pelvis W Iv Contrast Additional Contrast? None    Result Date: 2020  Patient MRN:  [id number] : 1975 Age: 53 years Gender: Male Order Date:  2020 4:51 PM EXAM: CT ABDOMEN PELVIS W IV CONTRAST NUMBER OF IMAGES \ views:  683 INDICATION: Dizziness, abdominal pain COMPARISON: Contemporaneous CTA chest study was performed, CT abdomen pelvis with contrast 6331 TECHNIQUE: Helical imaging was extended from the lower chest to the lower pelvis in conjunction with the intravenous administration of 90 mL of Isovue-370, and axial images were supplemented with sagittal and coronal MPR images. Low-dose CT  acquisition technique included one of following options; 1 . Automated exposure control, 2. Adjustment of MA and or KV according to patient's size or 3. Use of iterative reconstruction. FINDINGS: CHEST: Contemporaneous CTA imaging documented bilateral pulmonary emboli without right heart strain or acute pulmonary or pleural complications. LIVER: The liver is uniform in parenchymal density, and no focal lesions are identified. There is diffuse fatty change. GALLBLADDER AND BILIARY TREE: There is a pigtail drainage catheter in the gallbladder fossa, and it is uncertain if this is a post cholecystectomy drain or if this is a cholecystostomy associated with complete collapse of the gallbladder. There is no biliary ductal ectasia. SPLEEN: A circumscribed hypodense structure in the splenic bed could be a hypodense or infarcted spleen, but shows no evidence of extracapsular fluid or inflammatory change. The finding measures about 10 cm length by about 7 cm in thickness. It is uncertain if this represents residual following splenectomy, but there is no mention in the electronic record of splenectomy. . ADRENALS:  The adrenals are normal in appearance bilaterally.  PANCREAS:The pancreas shows no evidence of acute inflammation or mass lesions. KIDNEYS/BLADDER: The kidneys show uniform cortical enhancement and no evidence of outflow obstruction. There is no perinephric inflammatory change. Ureters are similar in course and caliber, and the bladder is normal in appearance. APPENDIX:  Normal BOWEL:  There is no evidence of inflammation, obstruction, or perforation of enteric structures. There is some fluid filled distal ileum, and the previously identified thickening of jejunal loops in the left upper abdomen is again documented. The appearance suggests enteritis with focal stenosis in the anterior right paramedian mid abdomen suggesting a focal stricture. There is no evidence of fistula formation. Crohn's disease would be a differential diagnostic consideration. PELVIS:  There is no pelvic adenopathy or dependent free pelvic fluid. Heddy  LYMPHATICS: Numerous small lymph nodes are clustered about the small bowel mesenteric root, and there are a few borderline prominent lymph nodes beneath the renal vascular pedicles. VASCULAR: There is no evidence of acute vascular pathology involving mesenteric or retroperitoneal great vessels. There is an inferior vena cava filter. SKELETAL: Mild levoscoliotic curvature and degenerative changes of the lumbar spine are noted. There are bilateral hip arthroplasty elements without acute complications. There is persistent small bowel mural thickening in the midabdomen involving the jejunum and proximal to mid ileum, with an apparent stenosis or focal stricture at about the level of the renal vascular pedicles and inferior vena cava filter in the right paramedian anterior abdomen. There is no evidence of perforation or obstruction, however. There is a pigtail drainage catheter in the gallbladder fossa, which could be a cholecystostomy catheter or a drainage catheter in the gallbladder fossa following cholecystectomy.  The gallbladder is present, it is completely this exam?->CRC FINDINGS: There is a filling defect present within posterior right lower lobe segmental pulmonary artery. Subtle filling defect is present within lingular segmental pulmonary artery as seen on axial image number 125. No evidence of saddle embolism. The heart is normal in size. No pericardial effusion. There is no mediastinal or hilar lymphadenopathy. There are minimal areas of subsegmental atelectasis in lung bases. There is minimal new left pleural effusion. No evidence of pneumonia. There is no pneumothorax. View of the upper abdomen shows normal bilateral adrenal glands. Postsurgical changes with areas of subcutaneous emphysema are associated with subxiphoid anterior abdominal wall. Postsurgical changes are also present within upper abdomen below margin of the liver. 1.  Small filling defect is located within right lower lobe segmental pulmonary artery and small filling defect is present within lingular segmental pulmonary artery. These findings are related to chronic bilateral pulmonary emboli. [place] of pulmonary embolus has decreased compared to prior from 2020. 2.  New minimal left pleural effusion. 3. Foci of gas are associated with the subxiphoid abdominal wall as well as areas of inflammation located in right upper abdomen below margin of liver. Clinical correlation recommended for recent surgery. Critical results were called by Dr. Sheri Choudhury to JOHN Lee on 10/7/2020 at 15:06. Cta Pulmonary W Contrast    Result Date: 2020  Patient MRN:  [id number] : 1975 Age: 53 years Gender: Male Order Date:  2020 4:51 PM EXAM: CTA PULMONARY W CONTRAST NUMBER OF IMAGES:  480 INDICATION: Dizziness, cholecystostomy drain COMPARISON: None Technique: Low-dose CT  acquisition technique included one of following options; 1 . Automated exposure control, 2. Adjustment of MA and or KV according to patient's size or 3. Use of iterative reconstruction. Contiguous spiral images were obtained in the axial plane, following the administration of 90 mL of Isovue-370 intravenous contrast using CT angiographic protocol. Sagittal and coronal images were reconstructed from the axial plane acquisition. Addition MIP reconstructions were presented to aid in the interpretation of this study. Findings: The central pulmonary arterial tree shows acute emboli in the descending pulmonary arteries bilaterally, but no areas of regional oligemia, pleural effusion, or peripheral infarction are identified. There is no evidence of right heart strain. Lung window images show no evidence of organized pneumonia or mass lesions. Soft tissue window images show no evidence of mediastinal adenopathy, and the thoracic aorta shows no acute complications. Bone window images show no acute chest wall traumatic findings. Upper abdominal images show no evidence of acute visceral pathology. Diffuse fatty change of the liver is noted. The patient appears to be post cholecystectomy with a pigtail drain in the gallbladder fossa. The adrenals, kidneys, and included bowel structures are unremarkable for some mural thickening of small bowel in the left upper abdomen that could indicate mild colitis. Diverticuli are noted in the left hemicolon. Bilateral central and dependent pulmonary emboli without peripheral infarcts No evidence of pneumonia, pulmonary mass lesions, or cardiac decompensation. Fatty change of the liver, and there is a pigtail drainage catheter in the gallbladder fossa. Upper abdominal bowel loops show some mural thickening, which is nonspecific, but could indicate enteritis. ALERT:  THIS IS AN ABNORMAL REPORT-bilateral lower lobe central acute pulmonary emboli without complicating right heart strain, pleural effusion, or pulmonary infarcts.  Note: The emergency department was contacted telephonically by myself at the time of this dictation communicate findings    Us Dup Lower Extremity Left Jorge    Result Date: 10/7/2020  Patient MRN:  [id number] : 1975 Age: 53 years Gender: Male Order Date:  10/7/2020 2:16 PM EXAM: US DUP LOWER EXTREMITY LEFT JORGE NUMBER OF IMAGES:  25 INDICATION:  leg pain, swelling, r/o DVT leg pain, swelling, r/o DVT What reading provider will be dictating this exam?->[facility] COMPARISON: None There is evidence for deep venous thrombosis, which is occlusive in the left iliac common femoral and profunda superficial femoral and tibial peroneal trunk and the anterior and posterior tibial veins There is otherwise good compressibility, there is good augmentation, there is good color flow. There is evidence for a large deep venous thrombosis involving the entire left leg from the left iliac to the level of the trifurcation trifurcation veins below the knee ALERT:  THIS IS AN ABNORMAL REPORT     Xr Chest Abdomen Ng Placement    Result Date: 2020  Patient MRN: [id number] : 1975 Age:  53 years Gender: Male Order Date: 2020 5:35 AM Exam: XR CHEST ABDOMEN NG PLACEMENT Number of Images: 1 view Indication:   NG placement NG placement Comparison: Prior study from 2020 is available Findings: Study demonstrate lower chest upper abdomen demonstrated the nasogastric tube to be in satisfactory position with the tip in the body of the stomach. There is an IVC filter noted to be in place. There is a right internal jugular catheter with tip in the superior vena cava. The abdomen gas pattern is nonspecific. Nasogastric tube in satisfactory position Other findings as described above         ASSESSMENT/PLAN :  54 yo male  BL PE s/p IVC  Hx GI bleed  S/p ex lap with cholecystectomy and R hemicolectomy with small bowel resection and side-to-side ileocolonic anastomosis with splenic thrombosis and infarction s/p splenectomy and omentectomy    - LLE DVT, extensive on US  - Likely provoked in nature due to immobility/stasis.  Prophylactic dose but he has multiple risk factors  - Seen by vascular, s/p JUAN. Plan for take back to angio tomorrow  - Case discussed with Dr. Rodger Claudio. We are both in agreement that give his prior surgical intervention, bleeding risk will be low and benefits outweigh risks in regard to DC on full dose oral AC with NOAC (Eliquis)  - Trend CBC  - Will follow      IGOR Valles - CNP  Electronically signed 10/8/2020 at 8:59 AM  Patient seen and examined personally.  Note edited to reflect my updates  Gladies Spurling, MD

## 2020-10-08 NOTE — PLAN OF CARE
Problem: Skin Integrity:  Goal: Will show no infection signs and symptoms  Description: Will show no infection signs and symptoms  Outcome: Met This Shift  Goal: Absence of new skin breakdown  Description: Absence of new skin breakdown  Outcome: Met This Shift     Problem: Falls - Risk of:  Goal: Will remain free from falls  Description: Will remain free from falls  Outcome: Met This Shift  Goal: Absence of physical injury  Description: Absence of physical injury  Outcome: Met This Shift     Problem: Pain:  Goal: Pain level will decrease  Description: Pain level will decrease  Outcome: Met This Shift  Goal: Control of acute pain  Description: Control of acute pain  Outcome: Met This Shift  Goal: Control of chronic pain  Description: Control of chronic pain  Outcome: Met This Shift

## 2020-10-08 NOTE — PROGRESS NOTES
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 Pt was discharged to nursing home on lovenox. He presented from SNF for L leg swelling. In ED he was found to have DVT - started on heparin drip and admitted for further evaluation and treatment. Consultation was placed to vascular surgery, hematology and surgery. Subjective  Patient seen and examined at bedside. Denies any headache or dizziness. Complaining of left leg pain. Chart reviewed, overnight events reviewed, scheduled for thrombolysis. Discussed with nursing staff    Exam:    BP (!) 102/56   Pulse 106   Temp 99.6 °F (37.6 °C) (Oral)   Resp 16   Ht 6' 1\" (1.854 m)   Wt 245 lb (111.1 kg)   SpO2 95%   BMI 32.32 kg/m²     HEENT: No pallor, no icterus. Respiratory:  CTA, good air entry. Cardiovascular: RRR, no murmur. Abdomen: Soft, non-tender, BS noted. Midline small incisional wound noted  Musculoskeletal: No joint pains or joint swelling noted.    Neurologic: awake, alert and following commands       Assessment/Plan:  Active Hospital Problems    Diagnosis Date Noted    Deep vein thrombosis (DVT) present on admission [facility]) [I82.409] 10/07/2020     · Chronic PE  · Diabetes mellitus type 2  · Dyslipidemia  · Hypertension  · Postop incisional wound dehiscence  · Anemia  · Pain  · Insulin-dependent diabetes mellitus    Plan:  · Continue with heparin, follow-up with hematology, to be scheduled for thrombolysis of his left lower extremity DVT, vascular surgery consulted, has IVC filter in place  · Continue with dressing changes  · Continue to monitor H&H and transfuse as needed  · We will place him on Dilaudid as needed for now  · Continue to monitor blood sugars closely      Labs:   Recent Labs     10/07/20  1318 10/08/20  0315   WBC 10.6 12.4*   HGB 8.3* 7.8*   HCT 26.9* 25.6*   * 784*     Recent Labs     10/07/20  1318 10/08/20  0315    138   K 3.8 3.5   CL 99 100   CO2 23 25   BUN 8 10   CREATININE 0.8 0.8   CALCIUM 8.5* 8.5*     Recent Labs     10/07/20  1318   AST 12   ALT 13   BILITOT <0.2   ALKPHOS 171*     Recent Labs     10/07/20  1318   INR 1.3     Recent Labs     10/07/20  1318   TROPONINI <0.01       Medications:  Reviewed    Infusion Medications    alteplase (ACTIVASE) 10 mg in 0.9% sodium chloride infusion 100 mL      heparin (PORCINE) Infusion      sodium chloride      heparin (PORCINE) Infusion 17 Units/kg/hr (10/08/20 1122)     Scheduled Medications    ceFAZolin (ANCEF) IVPB  2 g Intravenous See Admin Instructions    cholestyramine  1 packet Oral BID    cyclobenzaprine  10 mg Oral TID    DULoxetine  30 mg Oral Daily    fenofibrate  54 mg Oral Daily    ferrous sulfate  325 mg Oral BID    fluticasone  2 spray Nasal Daily    hydroxyurea  500 mg Oral BID    insulin glargine  25 Units Subcutaneous Daily    metoprolol tartrate  25 mg Oral BID    pantoprazole  40 mg Oral Daily    pravastatin  20 mg Oral Nightly    sodium chloride flush  10 mL Intravenous 2 times per day    insulin lispro  0-6 Units Subcutaneous TID WC    insulin lispro  0-3 Units Subcutaneous Nightly     PRN Meds: HYDROmorphone, heparin (porcine), heparin (porcine), sodium chloride flush, loperamide, melatonin, sodium chloride flush, acetaminophen **OR** acetaminophen, polyethylene glycol, promethazine **OR** ondansetron      Intake/Output Summary (Last 24 hours) at 10/8/2020 1339  Last data filed at 10/8/2020 0611  Gross per 24 hour   Intake 210 ml   Output --   Net 210 ml     · Body mass index is 32.32 kg/m².      · Diet  · Diet NPO Effective Now    · Code Status  · Full Code         Electronically signed by Rachael Taylor MD on 10/8/2020 at 1:39 PM  [employer]   Please contact me through [employer]    NOTE: This report was transcribed using voice recognition software. Every effort was made to ensure accuracy; however, inadvertent computerized transcription errors may be present.

## 2020-10-09 LAB
ANION GAP SERPL CALCULATED.3IONS-SCNC: 15 MMOL/L (ref 7–16)
APTT: 38.7 SEC (ref 24.5–35.1)
APTT: 47.5 SEC (ref 24.5–35.1)
APTT: 49.4 SEC (ref 24.5–35.1)
APTT: 57.5 SEC (ref 24.5–35.1)
BUN BLDV-MCNC: 8 MG/DL (ref 6–20)
CALCIUM SERPL-MCNC: 8.3 MG/DL (ref 8.6–10.2)
CHLORIDE BLD-SCNC: 103 MMOL/L (ref 98–107)
CO2: 22 MMOL/L (ref 22–29)
CREAT SERPL-MCNC: 0.7 MG/DL (ref 0.7–1.2)
FIBRINOGEN: 472 MG/DL (ref 225–540)
FIBRINOGEN: 479 MG/DL (ref 225–540)
FIBRINOGEN: 525 MG/DL (ref 225–540)
FIBRINOGEN: 653 MG/DL (ref 225–540)
GFR AFRICAN AMERICAN: >60
GFR NON-AFRICAN AMERICAN: >60 ML/MIN/1.73
GLUCOSE BLD-MCNC: 99 MG/DL (ref 74–99)
HCT VFR BLD CALC: 22.5 % (ref 37–54)
HCT VFR BLD CALC: 23.8 % (ref 37–54)
HCT VFR BLD CALC: 24.6 % (ref 37–54)
HCT VFR BLD CALC: 25.4 % (ref 37–54)
HEMOGLOBIN: 7.3 G/DL (ref 12.5–16.5)
HEMOGLOBIN: 7.5 G/DL (ref 12.5–16.5)
HEMOGLOBIN: 7.6 G/DL (ref 12.5–16.5)
HEMOGLOBIN: 7.8 G/DL (ref 12.5–16.5)
MCH RBC QN AUTO: 27 PG (ref 26–35)
MCH RBC QN AUTO: 27.1 PG (ref 26–35)
MCH RBC QN AUTO: 27.2 PG (ref 26–35)
MCH RBC QN AUTO: 27.7 PG (ref 26–35)
MCHC RBC AUTO-ENTMCNC: 30.7 % (ref 32–34.5)
MCHC RBC AUTO-ENTMCNC: 30.9 % (ref 32–34.5)
MCHC RBC AUTO-ENTMCNC: 31.5 % (ref 32–34.5)
MCHC RBC AUTO-ENTMCNC: 32.4 % (ref 32–34.5)
MCV RBC AUTO: 85.2 FL (ref 80–99.9)
MCV RBC AUTO: 86.2 FL (ref 80–99.9)
MCV RBC AUTO: 87.2 FL (ref 80–99.9)
MCV RBC AUTO: 88.2 FL (ref 80–99.9)
METER GLUCOSE: 82 MG/DL (ref 74–99)
METER GLUCOSE: 82 MG/DL (ref 74–99)
METER GLUCOSE: 88 MG/DL (ref 74–99)
METER GLUCOSE: 88 MG/DL (ref 74–99)
METER GLUCOSE: 94 MG/DL (ref 74–99)
PDW BLD-RTO: 17.2 FL (ref 11.5–15)
PDW BLD-RTO: 17.6 FL (ref 11.5–15)
PDW BLD-RTO: 17.9 FL (ref 11.5–15)
PDW BLD-RTO: 17.9 FL (ref 11.5–15)
PLATELET # BLD: 477 E9/L (ref 130–450)
PLATELET # BLD: 515 E9/L (ref 130–450)
PLATELET # BLD: 519 E9/L (ref 130–450)
PLATELET # BLD: 524 E9/L (ref 130–450)
PMV BLD AUTO: 8.4 FL (ref 7–12)
PMV BLD AUTO: 8.5 FL (ref 7–12)
PMV BLD AUTO: 8.6 FL (ref 7–12)
PMV BLD AUTO: 8.7 FL (ref 7–12)
POTASSIUM SERPL-SCNC: 3.5 MMOL/L (ref 3.5–5)
RBC # BLD: 2.64 E12/L (ref 3.8–5.8)
RBC # BLD: 2.76 E12/L (ref 3.8–5.8)
RBC # BLD: 2.82 E12/L (ref 3.8–5.8)
RBC # BLD: 2.88 E12/L (ref 3.8–5.8)
SARS-COV-2, NAAT: DETECTED
SODIUM BLD-SCNC: 140 MMOL/L (ref 132–146)
WBC # BLD: 14.3 E9/L (ref 4.5–11.5)
WBC # BLD: 16.8 E9/L (ref 4.5–11.5)
WBC # BLD: 17.6 E9/L (ref 4.5–11.5)
WBC # BLD: 19.7 E9/L (ref 4.5–11.5)

## 2020-10-09 PROCEDURE — 2709999900 HC NON-CHARGEABLE SUPPLY

## 2020-10-09 PROCEDURE — 85027 COMPLETE CBC AUTOMATED: CPT

## 2020-10-09 PROCEDURE — 6370000000 HC RX 637 (ALT 250 FOR IP): Performed by: SURGERY

## 2020-10-09 PROCEDURE — 82962 GLUCOSE BLOOD TEST: CPT

## 2020-10-09 PROCEDURE — 85384 FIBRINOGEN ACTIVITY: CPT

## 2020-10-09 PROCEDURE — 36591 DRAW BLOOD OFF VENOUS DEVICE: CPT

## 2020-10-09 PROCEDURE — 85730 THROMBOPLASTIN TIME PARTIAL: CPT

## 2020-10-09 PROCEDURE — 6360000002 HC RX W HCPCS: Performed by: SURGERY

## 2020-10-09 PROCEDURE — U0002 COVID-19 LAB TEST NON-CDC: HCPCS

## 2020-10-09 PROCEDURE — B51C1ZZ FLUOROSCOPY OF LEFT LOWER EXTREMITY VEINS USING LOW OSMOLAR CONTRAST: ICD-10-PCS | Performed by: SURGERY

## 2020-10-09 PROCEDURE — C1769 GUIDE WIRE: HCPCS

## 2020-10-09 PROCEDURE — 36415 COLL VENOUS BLD VENIPUNCTURE: CPT

## 2020-10-09 PROCEDURE — 80048 BASIC METABOLIC PNL TOTAL CA: CPT

## 2020-10-09 PROCEDURE — 2500000003 HC RX 250 WO HCPCS

## 2020-10-09 PROCEDURE — 6360000002 HC RX W HCPCS

## 2020-10-09 PROCEDURE — 37213 THROMBLYTIC ART/VEN THERAPY: CPT | Performed by: SURGERY

## 2020-10-09 PROCEDURE — 2000000000 HC ICU R&B

## 2020-10-09 PROCEDURE — 2580000003 HC RX 258: Performed by: SURGERY

## 2020-10-09 RX ADMIN — CHOLESTYRAMINE 4 G: 4 POWDER, FOR SUSPENSION ORAL at 20:07

## 2020-10-09 RX ADMIN — HYDROMORPHONE HYDROCHLORIDE 0.5 MG: 1 INJECTION, SOLUTION INTRAMUSCULAR; INTRAVENOUS; SUBCUTANEOUS at 14:30

## 2020-10-09 RX ADMIN — Medication 2 G: at 22:48

## 2020-10-09 RX ADMIN — ALTEPLASE 1 MG/HR: 2.2 INJECTION, POWDER, LYOPHILIZED, FOR SOLUTION INTRAVENOUS at 20:07

## 2020-10-09 RX ADMIN — HEPARIN SODIUM AND DEXTROSE 500 UNITS/HR: 10000; 5 INJECTION INTRAVENOUS at 22:29

## 2020-10-09 RX ADMIN — DULOXETINE HYDROCHLORIDE 30 MG: 30 CAPSULE, DELAYED RELEASE ORAL at 09:07

## 2020-10-09 RX ADMIN — HYDROXYUREA 500 MG: 500 CAPSULE ORAL at 09:07

## 2020-10-09 RX ADMIN — HYDROMORPHONE HYDROCHLORIDE 0.5 MG: 1 INJECTION, SOLUTION INTRAMUSCULAR; INTRAVENOUS; SUBCUTANEOUS at 08:20

## 2020-10-09 RX ADMIN — HYDROMORPHONE HYDROCHLORIDE 0.5 MG: 1 INJECTION, SOLUTION INTRAMUSCULAR; INTRAVENOUS; SUBCUTANEOUS at 06:16

## 2020-10-09 RX ADMIN — HYDROMORPHONE HYDROCHLORIDE 0.5 MG: 1 INJECTION, SOLUTION INTRAMUSCULAR; INTRAVENOUS; SUBCUTANEOUS at 00:31

## 2020-10-09 RX ADMIN — CYCLOBENZAPRINE 10 MG: 10 TABLET, FILM COATED ORAL at 20:07

## 2020-10-09 RX ADMIN — HYDROMORPHONE HYDROCHLORIDE 0.5 MG: 1 INJECTION, SOLUTION INTRAMUSCULAR; INTRAVENOUS; SUBCUTANEOUS at 12:30

## 2020-10-09 RX ADMIN — HYDROMORPHONE HYDROCHLORIDE 0.5 MG: 1 INJECTION, SOLUTION INTRAMUSCULAR; INTRAVENOUS; SUBCUTANEOUS at 22:28

## 2020-10-09 RX ADMIN — Medication 10 ML: at 20:09

## 2020-10-09 RX ADMIN — CYCLOBENZAPRINE 10 MG: 10 TABLET, FILM COATED ORAL at 14:37

## 2020-10-09 RX ADMIN — FERROUS SULFATE TAB 325 MG (65 MG ELEMENTAL FE) 325 MG: 325 (65 FE) TAB at 09:07

## 2020-10-09 RX ADMIN — Medication 2 G: at 14:37

## 2020-10-09 RX ADMIN — CYCLOBENZAPRINE 10 MG: 10 TABLET, FILM COATED ORAL at 09:06

## 2020-10-09 RX ADMIN — CHOLESTYRAMINE 4 G: 4 POWDER, FOR SUSPENSION ORAL at 09:06

## 2020-10-09 RX ADMIN — FENOFIBRATE 54 MG: 54 TABLET ORAL at 09:07

## 2020-10-09 RX ADMIN — FERROUS SULFATE TAB 325 MG (65 MG ELEMENTAL FE) 325 MG: 325 (65 FE) TAB at 20:07

## 2020-10-09 RX ADMIN — Medication 2 G: at 06:22

## 2020-10-09 RX ADMIN — METOPROLOL TARTRATE 25 MG: 25 TABLET, FILM COATED ORAL at 09:07

## 2020-10-09 RX ADMIN — METOPROLOL TARTRATE 25 MG: 25 TABLET, FILM COATED ORAL at 20:08

## 2020-10-09 RX ADMIN — HYDROMORPHONE HYDROCHLORIDE 0.5 MG: 1 INJECTION, SOLUTION INTRAMUSCULAR; INTRAVENOUS; SUBCUTANEOUS at 02:39

## 2020-10-09 RX ADMIN — Medication 10 ML: at 09:07

## 2020-10-09 RX ADMIN — HYDROMORPHONE HYDROCHLORIDE 0.5 MG: 1 INJECTION, SOLUTION INTRAMUSCULAR; INTRAVENOUS; SUBCUTANEOUS at 10:30

## 2020-10-09 RX ADMIN — HYDROMORPHONE HYDROCHLORIDE 0.5 MG: 1 INJECTION, SOLUTION INTRAMUSCULAR; INTRAVENOUS; SUBCUTANEOUS at 20:09

## 2020-10-09 RX ADMIN — PRAVASTATIN SODIUM 20 MG: 20 TABLET ORAL at 20:08

## 2020-10-09 RX ADMIN — PANTOPRAZOLE SODIUM 40 MG: 40 TABLET, DELAYED RELEASE ORAL at 09:07

## 2020-10-09 ASSESSMENT — PAIN SCALES - GENERAL
PAINLEVEL_OUTOF10: 7
PAINLEVEL_OUTOF10: 8
PAINLEVEL_OUTOF10: 7
PAINLEVEL_OUTOF10: 0
PAINLEVEL_OUTOF10: 0
PAINLEVEL_OUTOF10: 7
PAINLEVEL_OUTOF10: 0
PAINLEVEL_OUTOF10: 0
PAINLEVEL_OUTOF10: 9
PAINLEVEL_OUTOF10: 10
PAINLEVEL_OUTOF10: 10
PAINLEVEL_OUTOF10: 7
PAINLEVEL_OUTOF10: 9
PAINLEVEL_OUTOF10: 9
PAINLEVEL_OUTOF10: 0
PAINLEVEL_OUTOF10: 6

## 2020-10-09 ASSESSMENT — PAIN DESCRIPTION - ONSET
ONSET: ON-GOING
ONSET: ON-GOING

## 2020-10-09 ASSESSMENT — PAIN DESCRIPTION - PROGRESSION
CLINICAL_PROGRESSION: RAPIDLY IMPROVING
CLINICAL_PROGRESSION: RESOLVED
CLINICAL_PROGRESSION: RAPIDLY IMPROVING
CLINICAL_PROGRESSION: RESOLVED
CLINICAL_PROGRESSION: RAPIDLY IMPROVING
CLINICAL_PROGRESSION: RESOLVED
CLINICAL_PROGRESSION: GRADUALLY WORSENING
CLINICAL_PROGRESSION: RAPIDLY IMPROVING
CLINICAL_PROGRESSION: RAPIDLY IMPROVING
CLINICAL_PROGRESSION: RESOLVED

## 2020-10-09 ASSESSMENT — PAIN DESCRIPTION - PAIN TYPE
TYPE: ACUTE PAIN
TYPE: ACUTE PAIN

## 2020-10-09 ASSESSMENT — PAIN - FUNCTIONAL ASSESSMENT
PAIN_FUNCTIONAL_ASSESSMENT: INTOLERABLE, UNABLE TO DO ANY ACTIVE OR PASSIVE ACTIVITIES
PAIN_FUNCTIONAL_ASSESSMENT: PREVENTS OR INTERFERES SOME ACTIVE ACTIVITIES AND ADLS

## 2020-10-09 ASSESSMENT — PAIN DESCRIPTION - ORIENTATION
ORIENTATION: LEFT
ORIENTATION: LEFT

## 2020-10-09 ASSESSMENT — PAIN DESCRIPTION - LOCATION
LOCATION: LEG
LOCATION: LEG

## 2020-10-09 ASSESSMENT — PAIN DESCRIPTION - FREQUENCY
FREQUENCY: CONTINUOUS
FREQUENCY: CONTINUOUS

## 2020-10-09 ASSESSMENT — PAIN DESCRIPTION - DESCRIPTORS
DESCRIPTORS: ACHING;DISCOMFORT;BURNING
DESCRIPTORS: CONSTANT;BURNING;DISCOMFORT

## 2020-10-09 NOTE — OP NOTE
Cardiovascular Lab Procedure Report    Towanda Paget  1975    Date : 10/9/2020  Surgeon: Roberta Varma M.D. Pre-procedure Diagnosis: Left lower extremity DVT, IVC thrombosis  Post-procedure Diagnosis: Same  Procedure:    [id number] FU  Venogram of IVC and Left lower extremity with existing lysis catheter    Anesthesia: Local with IV sedation  Assistants: Cath Lab Staff  Estimated Blood Loss: Minimal  Complications: none  Findings:    Left    Inferior Vena Cava Thrombosed at level of filter and below   Common Iliac Vein thrombosed   External Iliac Vein thrombosed   Common Femoral Vein Some flow but large amount of residual thrombus   Superficial Femoral Vein Some flow but large amount of residual thrombus   AK Popliteal Vein Some flow but large amount of residual thrombus     Procedure Details :  Timeout preformed identifying pt and procedure. Left popliteal region prepped and draped in sterile fashion. Patient given sedation as needed throughout the case. Preexisting 6 fr sheath with EKOS lsysis catheter was already in place in the Left popliteal vein. The catheter was noted to be pulled backas compared to previous position. Advantage glide wire placed, and lysis catheter advanced to be placed at level of filter. Venogram preformed of  Left lower extremity and inferior vena cava. The US wire was than placed after removing the advantage glide wire. The TPA at 1 mg./hr to drug port, saline at 35 ml/hr to the coolant port, and 500 units/hr of heparin to the sheath.      Plan  Will take back to angio tomorrow  Serial labs  Neuro exams    Roberta Varma

## 2020-10-09 NOTE — PROGRESS NOTES
[facility]  Hematology/Oncology  Consult      Patient Name: Danette Feldman II  YOB: 1975  PCP: IGOR Beltre CNP   Referring Provider:      Reason for Consultation:   Chief Complaint   Patient presents with    Leg Pain     had his spleen removed 2 weeks ago, received Lovenox injections daily at the [facility]. Having left groin and leg pain. Sent in to r/o DVT        Subjective  Still with pain at groin area. Had repeat angio today    History of Present Illness: This is a 57-year-old male patient of Dr. Renay Lowe who is being seen for thrombocytosis and moderate normocytic anemia. He also has a past medical history of diabetes mellitus type 2, hyperlipidemia, bilateral PE with IVC filter, hypertension, and GI bleed. 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 His platelets were 1.1 million. His out patient work up showed JAK2/CALR/MPL all to be negative. His B12 and folate were within normal limits. He had elevated ferritin and low serum iron with normal TIBC. He was started on Hydrea 500 mg twice daily as well as an aspirin 81 mg. He presented [facility] the emergency room from the SN[facility] for left leg swelling. His platelet count was 664 and Hgb 8.3. He had a venous Doppler of the left extremity which showed evidence for a large deep venous thrombosis involving the entire left leg from the left iliac to the level of the trifurcation trifurcation veins below the knee. He was started on a heparin drip and admitted for further evaluation.       Diagnostic Data:     Past Medical History:   Diagnosis Date    Accident 11/2019    stepped on nail rt ft about 1 month ago- healed per patient    SIMÓN (acute kidney injury) ([facility] [address] 2008 apx    kidney bruised due to fall / [place]    Allergic rhinitis     Chronic back pain     Depression     Diabetes mellitus ([address]     Difficulty sleeping     at times    Displacement of lumbar intervertebral disc without myelopathy     Fibromyalgia     Fractured rib     2008 / healed    H/O seasonal allergies     Head injury 1980'S apx    no residual s/s    Hyperlipidemia     Hypertension     Obesity (BMI 35.0-39.9 without comorbidity)     bmi 39.2  weight 296 #    Osteoarthritis     Thoracic or lumbosacral neuritis or radiculitis, unspecified        Patient Active Problem List    Diagnosis Date Noted    Deep vein thrombosis (DVT) present on admission ([address] 10/07/2020    SIMÓN (acute kidney injury) ([address] 09/17/2020    Septicemia ([address] 09/17/2020    Intra-abdominal infection 09/17/2020    Acute GI bleeding 09/03/2020    Acute blood loss anemia     Thrombocytosis ([address] 08/10/2020    Decubitus ulcer of sacral area 08/01/2020    Abnormal findings on diagnostic imaging of gall bladder 08/01/2020    Splenic infarction 08/01/2020    Cyst of spleen 08/01/2020    Splenic abscess 08/01/2020    Anemia 08/01/2020    Other pulmonary embolism without acute cor pulmonale (HCC) 08/01/2020    Enterocolitis 07/31/2020    Rectus diastasis 11/01/2019    Recurrent unilateral inguinal hernia 11/01/2019    Cellulitis of sacral region 07/02/2019    Cellulitis of foot 06/30/2019    Neuropathy 06/30/2019    Cellulitis, wound, post-operative 06/30/2019    Left leg swelling 06/30/2019    SIRS (systemic inflammatory response syndrome) ([address] 06/30/2019    Primary osteoarthritis of right hip 11/03/2016    Primary osteoarthritis of left hip 04/11/2016    Type 2 diabetes mellitus ([address] 04/08/2016    Lumbar disc herniation 02/22/2016    Lumbar radiculopathy 12/17/2015    Osteoarthritis of spine with radiculopathy, lumbar region 12/17/2015    Class 1 obesity in adult 12/17/2015    Allergic rhinitis 11/23/2015    Essential hypertension 10/16/2015    Vitamin D deficiency 04/14/2015    Fibromyalgia 12/15/2014    Insomnia 07/04/2014    Osteoarthritis 03/27/2014    Lumbar spondylosis 01/07/2014    Neuropathic pain 05/08/2012        Past Surgical History:   Procedure Laterality Date    COLONOSCOPY N/A 9/5/2020    COLONOSCOPY WITH BIOPSY performed by Kiki Joya MD at [address] PTC NEW ACCESS  8/3/2020    CT PTC NEW ACCESS 8/3/2020 [facility] CT    FOOT SURGERY Right 1985    to treat shattered bones    HERNIA REPAIR  2001    DOUBLE HERNIA    HERNIA REPAIR Right 12/9/2019    LAPAROSCOPIC RIGHT INGUINAL HERNIA REPAIR, MESH 10x15 cm PLACEMENT performed by Earnestine Woodson MD at [address] [facility] Left 4/11/2016    LAPAROTOMY N/A 9/18/2020    LAPAROTOMY EXPLORATORY, CHOLECYSTECTOMY, BOWEL RESECTION, right darain colectomy, partial omentectomy, and splenectomy performed by Kiki Joya MD at [address] [facility] COLONOSCOPY FLX DX W/COLLJ [address] PFRMD N/A 5/7/2018    COLONOSCOPY DIAGNOSTIC performed by Hudson Elizabeth MD at [address] [facility] Left 2014    Dr. Yi Min ARTHROSCOPY Left 11 21 14    SHOULDER SURGERY  2001 &2007    RIGHT AND LEFT repair of tears    TOTAL HIP ARTHROPLASTY Right 10/31/2016    Total right hip  Cynthia Velasco MD    UPPER GASTROINTESTINAL ENDOSCOPY N/A 9/4/2020    EGD DIAGNOSTIC ONLY performed by Darrel Duenas MD at [facility] 1  2007    LEFT WRIST       Family History  Family History   Problem Relation Age of Onset    Hypertension Mother     Arthritis Father     Diabetes Father     Cancer Maternal Grandfather         Skin    Cancer Paternal Uncle         skin    Diabetes Paternal Grandfather     Heart Disease Maternal Grandmother     Stroke Maternal Aunt        Social History    TOBACCO:   reports that he has never smoked.  His smokeless tobacco use includes chew.  ETOH:   reports previous alcohol use. Home Medications  Prior to Admission medications    Medication Sig Start Date End Date Taking?  Authorizing Provider   cholestyramine (QUESTRAN) 4 g packet Take 1 packet by mouth 2 times daily 9/26/20   Veto Reilly MD   glucose (GLUTOSE) 40 % GEL Take 37.5 mLs by mouth as needed (low sugar) 9/26/20   Veto Reilly MD   insulin glargine (LANTUS) 100 UNIT/ML injection vial Inject 25 Units into the skin Daily 9/27/20   Veto Reilly MD   enoxaparin (LOVENOX) 40 MG/0.4ML injection Inject 0.4 mLs into the skin daily 9/27/20   Veto Reilly MD   metoprolol tartrate (LOPRESSOR) 25 MG tablet Take 1 tablet by mouth 2 times daily 9/11/20   Tsering Puente MD   melatonin 3 MG TABS tablet Take 3 mg by mouth nightly as needed (sleep)    Historical Provider, MD   fluticasone (FLONASE) 50 MCG/ACT nasal spray 2 sprays by Nasal route daily    Historical Provider, MD   cyclobenzaprine (FLEXERIL) 10 MG tablet Take 10 mg by mouth three times daily    Historical Provider, MD   fenofibrate (TRICOR) 54 MG tablet Take 54 mg by mouth daily    Historical Provider, MD   ferrous sulfate (IRON 325) 325 (65 Fe) MG tablet Take 325 mg by mouth 2 times daily    Historical Provider, MD   hydroxyurea (HYDREA) 500 MG chemo capsule Take 500 mg by mouth 2 times daily    Historical Provider, MD   loperamide (IMODIUM) 2 MG capsule Take 2 mg by mouth 4 times daily as needed for Diarrhea    Historical Provider, MD   insulin lispro (HUMALOG) 100 UNIT/ML injection vial Inject 3 Units into the skin 3 times daily (before meals) Injects 3 units three times daily before meals in addition to following sliding scale  : 0 units  141-180: 1 unit  181-220: 2 units  221-260: 3 units  261-300: 4 units  301-340: 5 units  >341: 6 units and call MD    Historical Provider, MD   pantoprazole (PROTONIX) 40 MG tablet Take 40 mg by mouth daily    Historical Provider, MD   DULoxetine (CYMBALTA) 30 MG extended release capsule Take 1 capsule by mouth daily 5/14/20   Denise Morelos APRN - CNP   pravastatin (PRAVACHOL) 20 MG tablet Take 1 tablet by mouth nightly 5/14/20   IGOR Muller CNP       Allergies  Allergies   Allergen Reactions    Seasonal      HAYFEVER / sneezing,watery eyes    Tramadol Itching       Review of Systems: +LLE edema and pain. Otherwise 12 point ROS negative      Objective  /65   Pulse 103   Temp 98.2 °F (36.8 °C) (Oral)   Resp 21   Ht 6' 1\" (1.854 m)   Wt 245 lb (111.1 kg)   SpO2 91%   BMI 32.32 kg/m²     Physical Exam:   Performance Status:  General: AAO to person, place, time, in no acute distress,   Head and neck : PERRLA, EOMI . Sclera non icteric. Oropharynx : Clear  Neck: no JVD,  no adenopathy. Heart: Regular rate and regular rhythm, no murmur  Lungs: Clear to auscultation   Extremities: +LLE edema  Abdomen: Soft, non-tender; +surgical scars  Skin:  No rash  Neurologic:Cranial nerves grossly intact. No focal motor or sensory deficits .     Recent Laboratory Data-   Lab Results   Component Value Date    WBC 17.6 (H) 10/09/2020    HGB 7.5 (L) 10/09/2020    HCT 23.8 (L) 10/09/2020    MCV 86.2 10/09/2020     (H) 10/09/2020    LYMPHOPCT 17.2 (L) 10/07/2020    RBC 2.76 (L) 10/09/2020    MCH 27.2 10/09/2020    MCHC 31.5 (L) 10/09/2020    RDW 17.6 (H) 10/09/2020    NEUTOPHILPCT 71.1 10/07/2020    MONOPCT 9.5 10/07/2020    BASOPCT 0.2 10/07/2020    NEUTROABS 7.52 (H) 10/07/2020    LYMPHSABS 1.82 10/07/2020    MONOSABS 1.01 (H) 10/07/2020    EOSABS 0.16 10/07/2020    BASOSABS 0.02 10/07/2020       Lab Results   Component Value Date     10/09/2020    K 3.5 10/09/2020     10/09/2020    CO2 22 10/09/2020    BUN 8 10/09/2020    CREATININE 0.7 10/09/2020    GLUCOSE 99 10/09/2020    CALCIUM 8.3 (L) 10/09/2020    PROT 6.0 (L) 10/07/2020    LABALBU 2.6 (L) 10/07/2020    BILITOT <0.2 10/07/2020    ALKPHOS 171 (H) 10/07/2020    AST 12 10/07/2020    ALT 13 10/07/2020 LABGLOM >60 10/09/2020    GFRAA >60 10/09/2020       Lab Results   Component Value Date    IRON 24 (L) 2020    TIBC 145 (L) 2020    FERRITIN 233 2020           Radiology-    Ct Abdomen Pelvis W Iv Contrast Additional Contrast? None    Result Date: 2020  Patient MRN:  [id number] : 1975 Age: 53 years Gender: Male Order Date:  2020 4:51 PM EXAM: CT ABDOMEN PELVIS W IV CONTRAST NUMBER OF IMAGES \ views:  348 INDICATION: Dizziness, abdominal pain COMPARISON: Contemporaneous CTA chest study was performed, CT abdomen pelvis with contrast 3680 TECHNIQUE: Helical imaging was extended from the lower chest to the lower pelvis in conjunction with the intravenous administration of 90 mL of Isovue-370, and axial images were supplemented with sagittal and coronal MPR images. Low-dose CT  acquisition technique included one of following options; 1 . Automated exposure control, 2. Adjustment of MA and or KV according to patient's size or 3. Use of iterative reconstruction. FINDINGS: CHEST: Contemporaneous CTA imaging documented bilateral pulmonary emboli without right heart strain or acute pulmonary or pleural complications. LIVER: The liver is uniform in parenchymal density, and no focal lesions are identified. There is diffuse fatty change. GALLBLADDER AND BILIARY TREE: There is a pigtail drainage catheter in the gallbladder fossa, and it is uncertain if this is a post cholecystectomy drain or if this is a cholecystostomy associated with complete collapse of the gallbladder. There is no biliary ductal ectasia. SPLEEN: A circumscribed hypodense structure in the splenic bed could be a hypodense or infarcted spleen, but shows no evidence of extracapsular fluid or inflammatory change. The finding measures about 10 cm length by about 7 cm in thickness. It is uncertain if this represents residual following splenectomy, but there is no mention in the electronic record of splenectomy. . ADRENALS: cholecystectomy. The gallbladder is present, it is completely collapsed. A cystic appearing structure is noted in the splenic bed, and could be a splenic remnant or a low-density spleen, not prominently enlarged. Xr Chest Portable    Result Date: 2020  Patient MRN:  [id number] : 1975 Age: 53 years Gender: Male Order Date:  2020 11:58 PM TECHNIQUE/NUMBER OF IMAGES/COMPARISON/CLINICAL HISTORY: Chest AP 1 image one view Comparison 10/17/2020. Central venous line placed. FINDINGS: Right internal jugular central venous catheter tip in SVC. There is no pneumothorax on the right breast. Lungs are clear. Heart and small size, mediastinum unremarkable. There is no perihilar vascular congestion. No acute cardiopulmonary process. Xr Chest Portable    Result Date: 2020  Single frontal projection (one view) of the chest. COMPARISON: 2020 FINDINGS: The heart, lungs, mediastinum and regional skeleton are unremarkable. The costophrenic angles are sharp and clear. There is no evidence of mediastinal shift. The heart is not enlarged. There is no evidence of hilar adenopathy. Lungs are negative for evidence of acute airspace consolidation, effusion, atelectasis, pneumothorax, pathologic nodularity or interstitial thickening. Regional bone density and trabecular pattern are normal.     Negative one view chest.    Cta Pulmonary W Contrast    Result Date: 10/7/2020  EXAMINATION: CTA OF THE CHEST 10/7/2020 2:48 pm TECHNIQUE: CTA of the chest was performed after the administration of intravenous contrast.  Multiplanar reformatted images are provided for review. MIP images are provided for review. Dose modulation, iterative reconstruction, and/or weight based adjustment of the mA/kV was utilized to reduce the radiation dose to as low as reasonably achievable.  COMPARISON: 2020 HISTORY: ORDERING SYSTEM PROVIDED HISTORY: DVT, tachy, r/o PE TECHNOLOGIST PROVIDED HISTORY: Reason for exam:->DVT, tachy, r/o PE What reading provider will be dictating this exam?->CRC FINDINGS: There is a filling defect present within posterior right lower lobe segmental pulmonary artery. Subtle filling defect is present within lingular segmental pulmonary artery as seen on axial image number 125. No evidence of saddle embolism. The heart is normal in size. No pericardial effusion. There is no mediastinal or hilar lymphadenopathy. There are minimal areas of subsegmental atelectasis in lung bases. There is minimal new left pleural effusion. No evidence of pneumonia. There is no pneumothorax. View of the upper abdomen shows normal bilateral adrenal glands. Postsurgical changes with areas of subcutaneous emphysema are associated with subxiphoid anterior abdominal wall. Postsurgical changes are also present within upper abdomen below margin of the liver. 1.  Small filling defect is located within right lower lobe segmental pulmonary artery and small filling defect is present within lingular segmental pulmonary artery. These findings are related to chronic bilateral pulmonary emboli. Ruffin of pulmonary embolus has decreased compared to prior from 2020. 2.  New minimal left pleural effusion. 3. Foci of gas are associated with the subxiphoid abdominal wall as well as areas of inflammation located in right upper abdomen below margin of liver. Clinical correlation recommended for recent surgery. Critical results were called by Dr. Brady Paulino to JOHN Lim on 10/7/2020 at 15:06. Cta Pulmonary W Contrast    Result Date: 2020  Patient MRN:  [id number] : 1975 Age: 53 years Gender: Male Order Date:  2020 4:51 PM EXAM: CTA PULMONARY W CONTRAST NUMBER OF IMAGES:  480 INDICATION: Dizziness, cholecystostomy drain COMPARISON: None Technique: Low-dose CT  acquisition technique included one of following options; 1 . Automated exposure control, 2.  Adjustment of MA and or KV according to patient's size or 3. Use of iterative reconstruction. Contiguous spiral images were obtained in the axial plane, following the administration of 90 mL of Isovue-370 intravenous contrast using CT angiographic protocol. Sagittal and coronal images were reconstructed from the axial plane acquisition. Addition MIP reconstructions were presented to aid in the interpretation of this study. Findings: The central pulmonary arterial tree shows acute emboli in the descending pulmonary arteries bilaterally, but no areas of regional oligemia, pleural effusion, or peripheral infarction are identified. There is no evidence of right heart strain. Lung window images show no evidence of organized pneumonia or mass lesions. Soft tissue window images show no evidence of mediastinal adenopathy, and the thoracic aorta shows no acute complications. Bone window images show no acute chest wall traumatic findings. Upper abdominal images show no evidence of acute visceral pathology. Diffuse fatty change of the liver is noted. The patient appears to be post cholecystectomy with a pigtail drain in the gallbladder fossa. The adrenals, kidneys, and included bowel structures are unremarkable for some mural thickening of small bowel in the left upper abdomen that could indicate mild colitis. Diverticuli are noted in the left hemicolon. Bilateral central and dependent pulmonary emboli without peripheral infarcts No evidence of pneumonia, pulmonary mass lesions, or cardiac decompensation. Fatty change of the liver, and there is a pigtail drainage catheter in the gallbladder fossa. Upper abdominal bowel loops show some mural thickening, which is nonspecific, but could indicate enteritis. ALERT:  THIS IS AN ABNORMAL REPORT-bilateral lower lobe central acute pulmonary emboli without complicating right heart strain, pleural effusion, or pulmonary infarcts.  Note: The emergency department was contacted telephonically by myself at the time of this dictation communicate findings    Us Dup Lower Extremity Left Jorge    Result Date: 10/7/2020  Patient MRN:  [id number] : 1975 Age: 53 years Gender: Male Order Date:  10/7/2020 2:16 PM EXAM: US DUP LOWER EXTREMITY LEFT JORGE NUMBER OF IMAGES:  25 INDICATION:  leg pain, swelling, r/o DVT leg pain, swelling, r/o DVT What reading provider will be dictating this exam?->[facility] COMPARISON: None There is evidence for deep venous thrombosis, which is occlusive in the left iliac common femoral and profunda superficial femoral and tibial peroneal trunk and the anterior and posterior tibial veins There is otherwise good compressibility, there is good augmentation, there is good color flow. There is evidence for a large deep venous thrombosis involving the entire left leg from the left iliac to the level of the trifurcation trifurcation veins below the knee ALERT:  THIS IS AN ABNORMAL REPORT     Xr Chest Abdomen Ng Placement    Result Date: 2020  Patient MRN: [id number] : 1975 Age:  53 years Gender: Male Order Date: 2020 5:35 AM Exam: XR CHEST ABDOMEN NG PLACEMENT Number of Images: 1 view Indication:   NG placement NG placement Comparison: Prior study from 2020 is available Findings: Study demonstrate lower chest upper abdomen demonstrated the nasogastric tube to be in satisfactory position with the tip in the body of the stomach. There is an IVC filter noted to be in place. There is a right internal jugular catheter with tip in the superior vena cava. The abdomen gas pattern is nonspecific.      Nasogastric tube in satisfactory position Other findings as described above         ASSESSMENT/PLAN :  54 yo male  BL PE s/p IVC  Hx GI bleed  S/p ex lap with cholecystectomy and R hemicolectomy with small bowel resection and side-to-side ileocolonic anastomosis with splenic thrombosis and infarction s/p splenectomy and omentectomy    - LLE DVT, extensive on US  - Likely provoked in nature due to immobility/stasis. Prophylactic dose but he has multiple risk factors  - Seen by vascular, s/p JUAN. Plan for take back to angio tomorrow  - Case discussed with Dr. Tiffanie Gonsales. We are both in agreement that given his prior surgical intervention, bleeding risk will be low and benefits outweigh risks in regard to DC on full dose oral AC with NOAC (Eliquis)  - Trend CBC  - Will follow    10/9/20  - Patient had repeat angio today which showed residual clot. The catheter was repositioned. currently on TPN heparin.   - Vascular surg following   - Follow-up angio scheduled for tomorrow  - Plts 477 and WBCs 16.8 trending down Hgb 7.6 stable     IGOR Montgomery - CNP  Electronically signed 10/9/2020 at 10:10 AM  Patient seen and examined personally.  Note edited to reflect my updates  Simone Hernandez MD

## 2020-10-09 NOTE — PROGRESS NOTES
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 Pt was discharged to nursing home on lovenox. He presented from SNF for L leg swelling. In ED he was found to have DVT - started on heparin drip and admitted for further evaluation and treatment.   Consultation was placed to vascular surgery, hematology and surgery. He underwent ECMO lysis catheter placement and is getting TPA and heparin infusion and plan is for angiogram tomorrow     Subjective  Patient seen and examined at bedside. Denies any headache or dizziness. Leg pain has improved, decrease swelling noted, chart reviewed, overnight events reviewed, denies any abdominal pain. Exam:    BP 98/68   Pulse 102   Temp 98.3 °F (36.8 °C) (Oral)   Resp 19   Ht 6' 1\" (1.854 m)   Wt 245 lb (111.1 kg)   SpO2 93%   BMI 32.32 kg/m²     HEENT: No pallor, no icterus. Respiratory:  CTA, good air entry. Cardiovascular: RRR, no murmur. Abdomen: Soft, small midline incision wound noted, BS noted. Musculoskeletal: No joint pains or joint swelling noted.    Neurologic: awake, alert and following commands       Assessment/Plan:  Active Hospital Problems    Diagnosis Date Noted    Deep vein thrombosis (DVT) present on admission [facility]) [I82.409] 10/07/2020     · Chronic PE  · Diabetes mellitus type 2  · Dyslipidemia  · Hypertension  · Postop incisional wound dehiscence  · Anemia  · Pain  · Insulin-dependent diabetes mellitus     Plan:  ? Status post ECMO catheter placement and currently on TPN heparin, follow-up angiogram scheduled for tomorrow, follow-up with vascular surgery, currently in ICU, critical care team following  ? Continue with dressing changes  ? Continue to monitor H&H and transfuse as needed  ? We will place him on Dilaudid as needed for now  ?  Continue to monitor blood sugars closely      Labs:   Recent Labs     10/09/20  0140 10/09/20  0600 10/09/20  1230   WBC 19.7* 17.6* 16.8*   HGB 7.3* 7.5* 7.6*   HCT 22.5* 23.8* 24.6*   * 519* 477*     Recent Labs     10/07/20  1318 10/08/20  0315 10/09/20  0249    138 140   K 3.8 3.5 3.5   CL 99 100 103   CO2 23 25 22   BUN 8 10 8   CREATININE 0.8 0.8 0.7   CALCIUM 8.5* 8.5* 8.3*     Recent Labs     10/07/20  1318   AST 12   ALT 13   BILITOT <0.2   ALKPHOS 171*     Recent Labs     10/07/20  1318   INR 1.3     Recent Labs     10/07/20  1318   TROPONINI <0.01       Medications:  Reviewed    Infusion Medications    alteplase (ACTIVASE) 10 mg in 0.9% sodium chloride infusion 100 mL 1 mg/hr (10/09/20 0800)    heparin (PORCINE) Infusion 500 Units/hr (10/09/20 0800)    sodium chloride 35 mL/hr at 10/09/20 0800    sodium chloride      heparin (PORCINE) Infusion Stopped (10/08/20 1527)     Scheduled Medications    sodium chloride flush  10 mL Intravenous 2 times per day    ceFAZolin (ANCEF) IVPB  2 g Intravenous Q8H    cholestyramine  1 packet Oral BID    cyclobenzaprine  10 mg Oral TID    DULoxetine  30 mg Oral Daily    fenofibrate  54 mg Oral Daily    ferrous sulfate  325 mg Oral BID    fluticasone  2 spray Nasal Daily    hydroxyurea  500 mg Oral BID    insulin glargine  25 Units Subcutaneous Daily    metoprolol tartrate  25 mg Oral BID    pantoprazole  40 mg Oral Daily    pravastatin  20 mg Oral Nightly    insulin lispro  0-6 Units Subcutaneous TID WC    insulin lispro  0-3 Units Subcutaneous Nightly     PRN Meds: HYDROmorphone, sodium chloride flush, acetaminophen, ondansetron, sodium chloride, heparin (porcine), heparin (porcine), loperamide, melatonin, [DISCONTINUED] acetaminophen **OR** acetaminophen, polyethylene glycol, promethazine **OR** [DISCONTINUED] ondansetron      Intake/Output Summary (Last 24 hours) at 10/9/2020 1307  Last data filed at 10/9/2020 0800  Gross per 24 hour   Intake 755 ml   Output 350 ml   Net 405 ml     · Body mass index is 32.32 kg/m². · Diet  · Diet NPO Effective Now    · Code Status  · Full Code       Electronically signed by Genevieve Olivas MD on 10/9/2020 at 1:07 PM  [employer]   Please contact me through [employer]    NOTE: This report was transcribed using voice recognition software. Every effort was made to ensure accuracy; however, inadvertent computerized transcription errors may be present.

## 2020-10-09 NOTE — PLAN OF CARE
Problem: Skin Integrity:  Goal: Absence of new skin breakdown  Description: Absence of new skin breakdown  10/9/2020 0111 by Shabbir Chavira RN  Outcome: Met This Shift     Problem: Falls - Risk of:  Goal: Will remain free from falls  Description: Will remain free from falls  10/9/2020 0111 by Shabbir Chavira RN  Outcome: Met This Shift     Problem: Falls - Risk of:  Goal: Absence of physical injury  Description: Absence of physical injury  10/9/2020 0111 by Shabbir Chavira RN  Outcome: Met This Shift     Problem: Falls - Risk of:  Goal: Absence of physical injury  Description: Absence of physical injury  10/9/2020 0111 by Shabbir Chavira RN  Outcome: Met This Shift     Problem: Pain:  Goal: Control of chronic pain  Description: Control of chronic pain  10/9/2020 0111 by Shabbir Chavira RN  Outcome: Met This Shift     Problem: Skin Integrity:  Goal: Will show no infection signs and symptoms  Description: Will show no infection signs and symptoms  10/9/2020 0111 by Shabbir Chavira RN  Outcome: Ongoing     Problem: Pain:  Goal: Control of acute pain  Description: Control of acute pain  10/9/2020 0111 by Shabbir Chavira RN  Outcome: Ongoing

## 2020-10-09 NOTE — PROGRESS NOTES
today---showed residual clot, catheter repositioned--keep ekos running today   - Labs reviewed   - tPA/heparin infusing via EKOS catheter   - Monitor H/H, APTT, fibrinogen q 6 hours while tPA infusing  - Bedrest  - Ancef while catheters in place  - prn IV dilaudid for pain control   - Frequent neurovascular checks, monitor insertion site for signs of bleeding/hematoma    - NPO after midnight with plans to return to [facility] tomorrow   - Hematology team following   - Seen by GS for superficial abdominal wound skin dehiscence--Daily wound dressing changes, packing with iodoform and dressing with gauze and tape per nursing       Dispo: [facility]    Electronically signed by IGOR Dominguez CNP on 10/9/2020 at 9:31 AM

## 2020-10-10 ENCOUNTER — ANESTHESIA EVENT (OUTPATIENT)
Dept: OPERATING ROOM | Age: 45
DRG: 182 | End: 2020-10-10
Payer: COMMERCIAL

## 2020-10-10 ENCOUNTER — APPOINTMENT (OUTPATIENT)
Dept: GENERAL RADIOLOGY | Age: 45
DRG: 182 | End: 2020-10-10
Payer: COMMERCIAL

## 2020-10-10 PROBLEM — I82.220 ACUTE THROMBOSIS OF INFERIOR VENA CAVA (HCC): Status: ACTIVE | Noted: 2020-10-10

## 2020-10-10 LAB
ANION GAP SERPL CALCULATED.3IONS-SCNC: 16 MMOL/L (ref 7–16)
APTT: 36.1 SEC (ref 24.5–35.1)
APTT: 45.6 SEC (ref 24.5–35.1)
APTT: 55.2 SEC (ref 24.5–35.1)
BUN BLDV-MCNC: 8 MG/DL (ref 6–20)
C-REACTIVE PROTEIN: 16.9 MG/DL (ref 0–0.4)
CALCIUM SERPL-MCNC: 8.8 MG/DL (ref 8.6–10.2)
CHLORIDE BLD-SCNC: 99 MMOL/L (ref 98–107)
CO2: 21 MMOL/L (ref 22–29)
CREAT SERPL-MCNC: 0.7 MG/DL (ref 0.7–1.2)
FERRITIN: 539 NG/ML
FIBRINOGEN: 373 MG/DL (ref 225–540)
FIBRINOGEN: 431 MG/DL (ref 225–540)
FIBRINOGEN: 443 MG/DL (ref 225–540)
GFR AFRICAN AMERICAN: >60
GFR NON-AFRICAN AMERICAN: >60 ML/MIN/1.73
GLUCOSE BLD-MCNC: 69 MG/DL (ref 74–99)
HCT VFR BLD CALC: 24.2 % (ref 37–54)
HCT VFR BLD CALC: 25.8 % (ref 37–54)
HCT VFR BLD CALC: 26.1 % (ref 37–54)
HCT VFR BLD CALC: 27.5 % (ref 37–54)
HEMOGLOBIN: 7.6 G/DL (ref 12.5–16.5)
HEMOGLOBIN: 7.9 G/DL (ref 12.5–16.5)
HEMOGLOBIN: 8 G/DL (ref 12.5–16.5)
HEMOGLOBIN: 8.5 G/DL (ref 12.5–16.5)
INR BLD: 1.4
MAGNESIUM: 1.6 MG/DL (ref 1.6–2.6)
MCH RBC QN AUTO: 26.8 PG (ref 26–35)
MCH RBC QN AUTO: 27 PG (ref 26–35)
MCH RBC QN AUTO: 27.1 PG (ref 26–35)
MCH RBC QN AUTO: 27.3 PG (ref 26–35)
MCHC RBC AUTO-ENTMCNC: 30.6 % (ref 32–34.5)
MCHC RBC AUTO-ENTMCNC: 30.7 % (ref 32–34.5)
MCHC RBC AUTO-ENTMCNC: 30.9 % (ref 32–34.5)
MCHC RBC AUTO-ENTMCNC: 31.4 % (ref 32–34.5)
MCV RBC AUTO: 85.8 FL (ref 80–99.9)
MCV RBC AUTO: 87.3 FL (ref 80–99.9)
MCV RBC AUTO: 88.4 FL (ref 80–99.9)
MCV RBC AUTO: 88.7 FL (ref 80–99.9)
METER GLUCOSE: 126 MG/DL (ref 74–99)
METER GLUCOSE: 169 MG/DL (ref 74–99)
METER GLUCOSE: 87 MG/DL (ref 74–99)
PDW BLD-RTO: 17.6 FL (ref 11.5–15)
PDW BLD-RTO: 18 FL (ref 11.5–15)
PDW BLD-RTO: 18.1 FL (ref 11.5–15)
PDW BLD-RTO: 18.2 FL (ref 11.5–15)
PHOSPHORUS: 2.9 MG/DL (ref 2.5–4.5)
PLATELET # BLD: 429 E9/L (ref 130–450)
PLATELET # BLD: 512 E9/L (ref 130–450)
PLATELET # BLD: 513 E9/L (ref 130–450)
PLATELET # BLD: 523 E9/L (ref 130–450)
PMV BLD AUTO: 8.6 FL (ref 7–12)
PMV BLD AUTO: 8.6 FL (ref 7–12)
PMV BLD AUTO: 8.8 FL (ref 7–12)
PMV BLD AUTO: 9.1 FL (ref 7–12)
POTASSIUM SERPL-SCNC: 3.5 MMOL/L (ref 3.5–5)
PROTHROMBIN TIME: 16.3 SEC (ref 9.3–12.4)
RBC # BLD: 2.82 E12/L (ref 3.8–5.8)
RBC # BLD: 2.91 E12/L (ref 3.8–5.8)
RBC # BLD: 2.99 E12/L (ref 3.8–5.8)
RBC # BLD: 3.11 E12/L (ref 3.8–5.8)
REASON FOR REJECTION: NORMAL
REJECTED TEST: NORMAL
SODIUM BLD-SCNC: 136 MMOL/L (ref 132–146)
WBC # BLD: 14.3 E9/L (ref 4.5–11.5)
WBC # BLD: 14.4 E9/L (ref 4.5–11.5)
WBC # BLD: 14.9 E9/L (ref 4.5–11.5)
WBC # BLD: 15.1 E9/L (ref 4.5–11.5)

## 2020-10-10 PROCEDURE — 82728 ASSAY OF FERRITIN: CPT

## 2020-10-10 PROCEDURE — 99255 IP/OBS CONSLTJ NEW/EST HI 80: CPT | Performed by: INTERNAL MEDICINE

## 2020-10-10 PROCEDURE — 86140 C-REACTIVE PROTEIN: CPT

## 2020-10-10 PROCEDURE — 6360000002 HC RX W HCPCS: Performed by: SURGERY

## 2020-10-10 PROCEDURE — 82962 GLUCOSE BLOOD TEST: CPT

## 2020-10-10 PROCEDURE — 2500000003 HC RX 250 WO HCPCS

## 2020-10-10 PROCEDURE — 6360000002 HC RX W HCPCS

## 2020-10-10 PROCEDURE — 06HG3DZ INSERTION OF INTRALUMINAL DEVICE INTO LEFT EXTERNAL ILIAC VEIN, PERCUTANEOUS APPROACH: ICD-10-PCS | Performed by: INTERNAL MEDICINE

## 2020-10-10 PROCEDURE — C1751 CATH, INF, PER/CENT/MIDLINE: HCPCS

## 2020-10-10 PROCEDURE — 84100 ASSAY OF PHOSPHORUS: CPT

## 2020-10-10 PROCEDURE — 37213 THROMBLYTIC ART/VEN THERAPY: CPT | Performed by: SURGERY

## 2020-10-10 PROCEDURE — 06HN3DZ INSERTION OF INTRALUMINAL DEVICE INTO LEFT FEMORAL VEIN, PERCUTANEOUS APPROACH: ICD-10-PCS | Performed by: INTERNAL MEDICINE

## 2020-10-10 PROCEDURE — 37238 OPEN/PERQ PLACE STENT SAME: CPT | Performed by: SURGERY

## 2020-10-10 PROCEDURE — 85730 THROMBOPLASTIN TIME PARTIAL: CPT

## 2020-10-10 PROCEDURE — C1769 GUIDE WIRE: HCPCS

## 2020-10-10 PROCEDURE — 71045 X-RAY EXAM CHEST 1 VIEW: CPT

## 2020-10-10 PROCEDURE — 37239 OPEN/PERQ PLACE STENT EA ADD: CPT | Performed by: SURGERY

## 2020-10-10 PROCEDURE — 6370000000 HC RX 637 (ALT 250 FOR IP): Performed by: SURGERY

## 2020-10-10 PROCEDURE — 80048 BASIC METABOLIC PNL TOTAL CA: CPT

## 2020-10-10 PROCEDURE — 85610 PROTHROMBIN TIME: CPT

## 2020-10-10 PROCEDURE — 2580000003 HC RX 258: Performed by: SURGERY

## 2020-10-10 PROCEDURE — 36415 COLL VENOUS BLD VENIPUNCTURE: CPT

## 2020-10-10 PROCEDURE — C1753 CATH, INTRAVAS ULTRASOUND: HCPCS

## 2020-10-10 PROCEDURE — 3E03317 INTRODUCTION OF OTHER THROMBOLYTIC INTO PERIPHERAL VEIN, PERCUTANEOUS APPROACH: ICD-10-PCS | Performed by: INTERNAL MEDICINE

## 2020-10-10 PROCEDURE — C1725 CATH, TRANSLUMIN NON-LASER: HCPCS

## 2020-10-10 PROCEDURE — C1894 INTRO/SHEATH, NON-LASER: HCPCS

## 2020-10-10 PROCEDURE — 37252 INTRVASC US NONCORONARY 1ST: CPT | Performed by: SURGERY

## 2020-10-10 PROCEDURE — 2000000000 HC ICU R&B

## 2020-10-10 PROCEDURE — 85384 FIBRINOGEN ACTIVITY: CPT

## 2020-10-10 PROCEDURE — C1876 STENT, NON-COA/NON-COV W/DEL: HCPCS

## 2020-10-10 PROCEDURE — 06HD3DZ INSERTION OF INTRALUMINAL DEVICE INTO LEFT COMMON ILIAC VEIN, PERCUTANEOUS APPROACH: ICD-10-PCS | Performed by: INTERNAL MEDICINE

## 2020-10-10 PROCEDURE — 85027 COMPLETE CBC AUTOMATED: CPT

## 2020-10-10 PROCEDURE — 83735 ASSAY OF MAGNESIUM: CPT

## 2020-10-10 PROCEDURE — 2709999900 HC NON-CHARGEABLE SUPPLY

## 2020-10-10 RX ORDER — NICOTINE POLACRILEX 4 MG
15 LOZENGE BUCCAL PRN
Status: DISCONTINUED | OUTPATIENT
Start: 2020-10-10 | End: 2020-10-15 | Stop reason: HOSPADM

## 2020-10-10 RX ORDER — MAGNESIUM SULFATE IN WATER 40 MG/ML
2 INJECTION, SOLUTION INTRAVENOUS ONCE
Status: DISCONTINUED | OUTPATIENT
Start: 2020-10-10 | End: 2020-10-15 | Stop reason: HOSPADM

## 2020-10-10 RX ORDER — DEXTROSE MONOHYDRATE 50 MG/ML
100 INJECTION, SOLUTION INTRAVENOUS PRN
Status: DISCONTINUED | OUTPATIENT
Start: 2020-10-10 | End: 2020-10-15 | Stop reason: HOSPADM

## 2020-10-10 RX ORDER — POTASSIUM CHLORIDE 7.45 MG/ML
10 INJECTION INTRAVENOUS
Status: ACTIVE | OUTPATIENT
Start: 2020-10-10 | End: 2020-10-10

## 2020-10-10 RX ORDER — DEXTROSE MONOHYDRATE 25 G/50ML
12.5 INJECTION, SOLUTION INTRAVENOUS PRN
Status: DISCONTINUED | OUTPATIENT
Start: 2020-10-10 | End: 2020-10-15 | Stop reason: HOSPADM

## 2020-10-10 RX ORDER — PREGABALIN 150 MG/1
300 CAPSULE ORAL 2 TIMES DAILY
COMMUNITY
End: 2021-03-30 | Stop reason: SDUPTHER

## 2020-10-10 RX ADMIN — ALTEPLASE 1 MG/HR: 2.2 INJECTION, POWDER, LYOPHILIZED, FOR SOLUTION INTRAVENOUS at 16:37

## 2020-10-10 RX ADMIN — METOPROLOL TARTRATE 25 MG: 25 TABLET, FILM COATED ORAL at 19:55

## 2020-10-10 RX ADMIN — SODIUM CHLORIDE, PRESERVATIVE FREE 10 ML: 5 INJECTION INTRAVENOUS at 23:50

## 2020-10-10 RX ADMIN — SODIUM CHLORIDE, PRESERVATIVE FREE 10 ML: 5 INJECTION INTRAVENOUS at 21:49

## 2020-10-10 RX ADMIN — Medication 10 ML: at 08:35

## 2020-10-10 RX ADMIN — HYDROMORPHONE HYDROCHLORIDE 0.5 MG: 1 INJECTION, SOLUTION INTRAMUSCULAR; INTRAVENOUS; SUBCUTANEOUS at 04:04

## 2020-10-10 RX ADMIN — HYDROMORPHONE HYDROCHLORIDE 0.5 MG: 1 INJECTION, SOLUTION INTRAMUSCULAR; INTRAVENOUS; SUBCUTANEOUS at 08:34

## 2020-10-10 RX ADMIN — FERROUS SULFATE TAB 325 MG (65 MG ELEMENTAL FE) 325 MG: 325 (65 FE) TAB at 19:55

## 2020-10-10 RX ADMIN — HYDROMORPHONE HYDROCHLORIDE 0.5 MG: 1 INJECTION, SOLUTION INTRAMUSCULAR; INTRAVENOUS; SUBCUTANEOUS at 19:48

## 2020-10-10 RX ADMIN — HYDROMORPHONE HYDROCHLORIDE 0.5 MG: 1 INJECTION, SOLUTION INTRAMUSCULAR; INTRAVENOUS; SUBCUTANEOUS at 15:13

## 2020-10-10 RX ADMIN — CHOLESTYRAMINE 4 G: 4 POWDER, FOR SUSPENSION ORAL at 08:34

## 2020-10-10 RX ADMIN — HYDROMORPHONE HYDROCHLORIDE 0.5 MG: 1 INJECTION, SOLUTION INTRAMUSCULAR; INTRAVENOUS; SUBCUTANEOUS at 23:50

## 2020-10-10 RX ADMIN — FENOFIBRATE 54 MG: 54 TABLET ORAL at 08:34

## 2020-10-10 RX ADMIN — Medication 3 MG: at 19:52

## 2020-10-10 RX ADMIN — FLUTICASONE PROPIONATE 2 SPRAY: 50 SPRAY, METERED NASAL at 12:59

## 2020-10-10 RX ADMIN — INSULIN LISPRO 1 UNITS: 100 INJECTION, SOLUTION INTRAVENOUS; SUBCUTANEOUS at 20:00

## 2020-10-10 RX ADMIN — Medication 2 G: at 22:30

## 2020-10-10 RX ADMIN — HYDROXYUREA 500 MG: 500 CAPSULE ORAL at 19:55

## 2020-10-10 RX ADMIN — DULOXETINE HYDROCHLORIDE 30 MG: 30 CAPSULE, DELAYED RELEASE ORAL at 08:34

## 2020-10-10 RX ADMIN — METOPROLOL TARTRATE 25 MG: 25 TABLET, FILM COATED ORAL at 08:34

## 2020-10-10 RX ADMIN — HYDROXYUREA 500 MG: 500 CAPSULE ORAL at 08:34

## 2020-10-10 RX ADMIN — CYCLOBENZAPRINE 10 MG: 10 TABLET, FILM COATED ORAL at 15:13

## 2020-10-10 RX ADMIN — FERROUS SULFATE TAB 325 MG (65 MG ELEMENTAL FE) 325 MG: 325 (65 FE) TAB at 08:34

## 2020-10-10 RX ADMIN — Medication 2 G: at 06:13

## 2020-10-10 RX ADMIN — HYDROMORPHONE HYDROCHLORIDE 0.5 MG: 1 INJECTION, SOLUTION INTRAMUSCULAR; INTRAVENOUS; SUBCUTANEOUS at 00:26

## 2020-10-10 RX ADMIN — HYDROMORPHONE HYDROCHLORIDE 0.5 MG: 1 INJECTION, SOLUTION INTRAMUSCULAR; INTRAVENOUS; SUBCUTANEOUS at 17:27

## 2020-10-10 RX ADMIN — HYDROMORPHONE HYDROCHLORIDE 0.5 MG: 1 INJECTION, SOLUTION INTRAMUSCULAR; INTRAVENOUS; SUBCUTANEOUS at 06:13

## 2020-10-10 RX ADMIN — CYCLOBENZAPRINE 10 MG: 10 TABLET, FILM COATED ORAL at 08:34

## 2020-10-10 RX ADMIN — ALTEPLASE 1 MG/HR: 2.2 INJECTION, POWDER, LYOPHILIZED, FOR SOLUTION INTRAVENOUS at 05:06

## 2020-10-10 RX ADMIN — SODIUM CHLORIDE, PRESERVATIVE FREE 10 ML: 5 INJECTION INTRAVENOUS at 21:50

## 2020-10-10 RX ADMIN — HYDROMORPHONE HYDROCHLORIDE 0.5 MG: 1 INJECTION, SOLUTION INTRAMUSCULAR; INTRAVENOUS; SUBCUTANEOUS at 13:01

## 2020-10-10 RX ADMIN — HYDROMORPHONE HYDROCHLORIDE 0.5 MG: 1 INJECTION, SOLUTION INTRAMUSCULAR; INTRAVENOUS; SUBCUTANEOUS at 21:49

## 2020-10-10 RX ADMIN — Medication 10 ML: at 19:55

## 2020-10-10 RX ADMIN — PRAVASTATIN SODIUM 20 MG: 20 TABLET ORAL at 19:55

## 2020-10-10 RX ADMIN — CYCLOBENZAPRINE 10 MG: 10 TABLET, FILM COATED ORAL at 19:55

## 2020-10-10 RX ADMIN — PANTOPRAZOLE SODIUM 40 MG: 40 TABLET, DELAYED RELEASE ORAL at 08:34

## 2020-10-10 RX ADMIN — HYDROMORPHONE HYDROCHLORIDE 0.5 MG: 1 INJECTION, SOLUTION INTRAMUSCULAR; INTRAVENOUS; SUBCUTANEOUS at 11:14

## 2020-10-10 RX ADMIN — SODIUM CHLORIDE, PRESERVATIVE FREE 10 ML: 5 INJECTION INTRAVENOUS at 23:51

## 2020-10-10 ASSESSMENT — PAIN SCALES - GENERAL
PAINLEVEL_OUTOF10: 10
PAINLEVEL_OUTOF10: 10
PAINLEVEL_OUTOF10: 0
PAINLEVEL_OUTOF10: 9
PAINLEVEL_OUTOF10: 9
PAINLEVEL_OUTOF10: 6
PAINLEVEL_OUTOF10: 6
PAINLEVEL_OUTOF10: 10
PAINLEVEL_OUTOF10: 6
PAINLEVEL_OUTOF10: 10
PAINLEVEL_OUTOF10: 9
PAINLEVEL_OUTOF10: 10
PAINLEVEL_OUTOF10: 9
PAINLEVEL_OUTOF10: 0
PAINLEVEL_OUTOF10: 10
PAINLEVEL_OUTOF10: 7

## 2020-10-10 ASSESSMENT — ENCOUNTER SYMPTOMS
SHORTNESS OF BREATH: 0
DIARRHEA: 0
NAUSEA: 0
COUGH: 0
VOMITING: 0

## 2020-10-10 NOTE — PATIENT CARE CONFERENCE
P Quality Flow/Interdisciplinary Rounds Progress Note        Quality Flow Rounds held on October 10, 2020    Disciplines Attending:  ICU rounding team, resp therapy, bedside nurse, charge nurse    Lilliana Ochoa II was admitted on 10/7/2020 12:37 PM    Anticipated Discharge Date:  Expected Discharge Date: 10/23/20    Disposition:    Balbir Score:  Balbir Scale Score: 11    Readmission Risk              Risk of Unplanned Readmission:        29           Discussed patient goal for the day, patient clinical progression, and barriers to discharge. The following Goal(s) of the Day/Commitment(s) have been identified:  Continue to monitor and treat as indicated.       Stephane Hawkins  October 10, 2020

## 2020-10-10 NOTE — CONSULTS
[address]  Internal Medicine Residency Program  History and Physical  MICU    Patient:  Lily Ross 53 y.o. male MRN: [id number]     Date of Service: 10/9/2020    Hospital Day: 3      Chief complaint: Left leg swelling  Reason for MICU: COVID-19 positive  History of Present Illness   The patient is a 53 y.o. male with a PMHx of bilateral PE June 2020 s/p IVC filter on 8/2020, HTN, DM2 insulin-dependent, HLD initially presented with left leg pain and swelling for 1 week. Home and complained of left leg pain is progressive swelling for 1 week. He is on Lovenox injections for previous PE and splenic thrombosis. CTA showed small filling defect in the right lower lobe consistent with chronic PE.  DVT ultrasound showed large left DVT. Patient was then admitted to [facility] on 10/7 and underwent EKOS on 10/8 per Vascular Surgery. On 10/9, he was found to be COVID-19 positive and was transferred to the MICU. He denies any recent fevers, chills, cough, shortness of breath, chest pain, or sick contacts. Patient has been bed bound for the past several months due to recent hospitalizations listed below.     Other pertinent history:  6/2- [facility] arrest [facility] (no records) with subsequent development of bilateral embolism with splenic infarct from splenic thrombosis  7/31-admitted for splenic abscess, no surgical intervention done at that time  9/4- noted bright red blood per rectum, EGD showed mild gastritis, colonoscopy unremarkable  9/5- anticoagulation held, IVC filter placed  9/11- presented with abdominal pain, found to have intra-abdominal abscess, s/p exploratory laparotomy with cholecystectomy, right hemicolectomy, small bowel resection    Past Medical History:      Diagnosis Date    Accident 11/2019    stepped on nail rt ft about 1 month ago- healed per patient    SIMÓN (acute kidney injury) ([facility] [address] 2008 apx    kidney bruised due to fall / Arelis Faulk    Allergic rhinitis     Chronic back pain     Depression     Diabetes mellitus ([facility] [address]     Difficulty sleeping     at times    Displacement of lumbar intervertebral disc without myelopathy     Fibromyalgia     Fractured rib     2008 / healed    H/O seasonal allergies     Head injury 1980'S apx    no residual s/s    Hyperlipidemia     Hypertension     Obesity (BMI 35.0-39.9 without comorbidity)     bmi 39.2  weight 296 #    Osteoarthritis     Thoracic or lumbosacral neuritis or radiculitis, unspecified        Past Surgical History:        Procedure Laterality Date    COLONOSCOPY N/A 9/5/2020    COLONOSCOPY WITH BIOPSY performed by Payal Nelson MD at [address] PTC NEW ACCESS  8/3/2020    CT PTC NEW ACCESS 8/3/2020 [facility] CT    FOOT SURGERY Right 1985    to treat shattered bones    HERNIA REPAIR  2001    DOUBLE HERNIA    HERNIA REPAIR Right 12/9/2019    LAPAROSCOPIC RIGHT INGUINAL HERNIA REPAIR, MESH 10x15 cm PLACEMENT performed by Jovanny Mcgee MD at [facility] Left 4/11/2016    LAPAROTOMY N/A 9/18/2020    LAPAROTOMY EXPLORATORY, CHOLECYSTECTOMY, BOWEL RESECTION, right darian colectomy, partial omentectomy, and splenectomy performed by Payal Nelson MD at [address] FLX DX W/COLLIRENE Queen 1978 PFRMD N/A 5/7/2018    COLONOSCOPY DIAGNOSTIC performed by Renaye Aase, MD at [facility] Left 2014    Dr. Zuleta Pap Left 11 21 14    SHOULDER SURGERY  2001 &2007    RIGHT AND LEFT repair of tears    TOTAL HIP ARTHROPLASTY Right 10/31/2016    Total right hip  Kristopher MD Moy    UPPER GASTROINTESTINAL ENDOSCOPY N/A 9/4/2020    EGD DIAGNOSTIC ONLY performed by Jef Riggins MD at [place] [address]  2007    LEFT WRIST       Medications Prior to Admission:    Prior to Admission medications    Medication Sig Start Date End Date Taking?  Authorizing Provider   cholestyramine (QUESTRAN) 4 g packet Take 1 packet by mouth 2 times daily 9/26/20   Nuzhat Hardin MD   glucose (GLUTOSE) 40 % GEL Take 37.5 mLs by mouth as needed (low sugar) 9/26/20   Nuzhat Hardin MD   insulin glargine (LANTUS) 100 UNIT/ML injection vial Inject 25 Units into the skin Daily 9/27/20   Nuzhat Hardin MD   enoxaparin (LOVENOX) 40 MG/0.4ML injection Inject 0.4 mLs into the skin daily 9/27/20   Nuzhat Hardin MD   metoprolol tartrate (LOPRESSOR) 25 MG tablet Take 1 tablet by mouth 2 times daily 9/11/20   Nik Brewer MD   melatonin 3 MG TABS tablet Take 3 mg by mouth nightly as needed (sleep)    Historical Provider, MD   fluticasone (FLONASE) 50 MCG/ACT nasal spray 2 sprays by Nasal route daily    Historical Provider, MD   cyclobenzaprine (FLEXERIL) 10 MG tablet Take 10 mg by mouth three times daily    Historical Provider, MD   fenofibrate (TRICOR) 54 MG tablet Take 54 mg by mouth daily    Historical Provider, MD   ferrous sulfate (IRON 325) 325 (65 Fe) MG tablet Take 325 mg by mouth 2 times daily    Historical Provider, MD   hydroxyurea (HYDREA) 500 MG chemo capsule Take 500 mg by mouth 2 times daily    Historical Provider, MD   loperamide (IMODIUM) 2 MG capsule Take 2 mg by mouth 4 times daily as needed for Diarrhea    Historical Provider, MD   insulin lispro (HUMALOG) 100 UNIT/ML injection vial Inject 3 Units into the skin 3 times daily (before meals) Injects 3 units three times daily before meals in addition to following sliding scale  : 0 units  141-180: 1 unit  181-220: 2 units  221-260: 3 units  261-300: 4 units  301-340: 5 units  >341: 6 units and call MD    Historical Provider, MD   pantoprazole (PROTONIX) 40 MG tablet Take 40 mg by mouth daily    Historical Provider, MD   DULoxetine (CYMBALTA) 30 MG extended release capsule Take 1 capsule by mouth daily 5/14/20   IGOR Llamas CNP   pravastatin (PRAVACHOL) 20 MG tablet Take 1 tablet by mouth nightly 5/14/20   IGOR Llamas - CNP Allergies:  Seasonal and Tramadol    Social History:   TOBACCO:   reports that he has never smoked. His smokeless tobacco use includes chew. ETOH:   reports previous alcohol use. Family History:       Problem Relation Age of Onset    Hypertension Mother     Arthritis Father     Diabetes Father     Cancer Maternal Grandfather         Skin    Cancer Paternal Uncle         skin    Diabetes Paternal Grandfather     Heart Disease Maternal Grandmother     Stroke Maternal Aunt        REVIEW OF SYSTEMS:    · Constitutional: No fever, no chills, no change in weight; good appetite  · HEENT: No blurred vision, no ear problems, no sore throat, no rhinorrhea. · Respiratory: No cough, no sputum production, no pleuritic chest pain, no shortness of breath  · Cardiology: No angina, no dyspnea on exertion, no paroxysmal nocturnal dyspnea, no orthopnea, no palpitation, no leg swelling. · Gastroenterology: No dysphagia, no reflux; no abdominal pain, no nausea or vomiting; no constipation or diarrhea.  No hematochezia   · Genitourinary: No dysuria, no frequency, hesitancy; no hematuria  · Musculoskeletal: Left lower extremity pain and swelling  · Neurology: no focal weakness in extremities, no slurred speech, no double vision, no tingling or numbness sensation  · Endocrinology: no temperature intolerance, no polyphagia, polydipsia or polyuria  · Hematology: no increased bleeding, no bruising, no lymphadenopathy  · Skin: no skin changes noticed by patient  · Psychology: no depressed mood, no suicidal ideation    Physical Exam   · Vitals: /77   Pulse 101   Temp 95.7 °F (35.4 °C) (Axillary)   Resp 18   Ht 6' 1\" (1.854 m)   Wt 245 lb (111.1 kg)   SpO2 98%   BMI 32.32 kg/m²   ·    ·      · General Appearance: alert and oriented to person, place and time, well developed and well- nourished, in no acute distress  · Skin: clean dressings in place on abdomen and coccygeal area  · Head: normocephalic and atraumatic  · Eyes: pupils equal, round, and reactive to light, extraocular eye movements intact, conjunctivae normal  · ENT: tympanic membrane, external ear and ear canal normal bilaterally, nose without deformity, nasal mucosa and turbinates normal without polyps  · Neck: supple and non-tender without mass, no thyromegaly or thyroid nodules, no cervical lymphadenopathy  · Pulmonary/Chest: clear to auscultation bilaterally- no wheezes, rales or rhonchi, normal air movement, no respiratory distress  · Cardiovascular: normal rate, regular rhythm, normal S1 and S2, no murmurs, rubs, clicks, or gallops, distal pulses intact, no carotid bruits  · Abdomen: soft, non-tender, non-distended, normal bowel sounds, no masses or organomegaly  · Extremities: bilateral, pitting edema on lower extremities L>R  · Musculoskeletal: normal range of motion, no joint swelling, deformity or tenderness  · Neurologic: reflexes normal and symmetric, no cranial nerve deficit, gait, coordination and speech normal     Lines     site day    Art line   None    TLC None    PICC None    Hemoaccess None    Oxygen:     Room air    Labs and Imaging Studies   Basic Labs  CBC:   Lab Results   Component Value Date    WBC 14.3 10/09/2020    RBC 2.88 10/09/2020    HGB 7.8 10/09/2020    HCT 25.4 10/09/2020    MCV 88.2 10/09/2020    RDW 17.9 10/09/2020     10/09/2020     CMP:  Lab Results   Component Value Date     10/09/2020    K 3.5 10/09/2020    K 3.5 10/08/2020     10/09/2020    CO2 22 10/09/2020    BUN 8 10/09/2020    PROT 6.0 10/07/2020       Imaging Studies:     Ct Abdomen Pelvis W Iv Contrast Additional Contrast? None    Result Date: 2020  Patient MRN:  [id number] : 1975 Age: 53 years Gender: Male Order Date:  2020 4:51 PM EXAM: CT ABDOMEN PELVIS W IV CONTRAST NUMBER OF IMAGES \ views:  414 INDICATION: Dizziness, abdominal pain COMPARISON: Contemporaneous CTA chest study was performed, CT abdomen pelvis with contrast July 31, 7791 TECHNIQUE: Helical imaging was extended from the lower chest to the lower pelvis in conjunction with the intravenous administration of 90 mL of Isovue-370, and axial images were supplemented with sagittal and coronal MPR images. Low-dose CT  acquisition technique included one of following options; 1 . Automated exposure control, 2. Adjustment of MA and or KV according to patient's size or 3. Use of iterative reconstruction. FINDINGS: CHEST: Contemporaneous CTA imaging documented bilateral pulmonary emboli without right heart strain or acute pulmonary or pleural complications. LIVER: The liver is uniform in parenchymal density, and no focal lesions are identified. There is diffuse fatty change. GALLBLADDER AND BILIARY TREE: There is a pigtail drainage catheter in the gallbladder fossa, and it is uncertain if this is a post cholecystectomy drain or if this is a cholecystostomy associated with complete collapse of the gallbladder. There is no biliary ductal ectasia. SPLEEN: A circumscribed hypodense structure in the splenic bed could be a hypodense or infarcted spleen, but shows no evidence of extracapsular fluid or inflammatory change. The finding measures about 10 cm length by about 7 cm in thickness. It is uncertain if this represents residual following splenectomy, but there is no mention in the electronic record of splenectomy. . ADRENALS:  The adrenals are normal in appearance bilaterally. PANCREAS:The pancreas shows no evidence of acute inflammation or mass lesions. KIDNEYS/BLADDER: The kidneys show uniform cortical enhancement and no evidence of outflow obstruction. There is no perinephric inflammatory change. Ureters are similar in course and caliber, and the bladder is normal in appearance. APPENDIX:  Normal BOWEL:  There is no evidence of inflammation, obstruction, or perforation of enteric structures.  There is some fluid filled distal ileum, and the previously identified thickening of jejunal loops in the left upper abdomen is again documented. The appearance suggests enteritis with focal stenosis in the anterior right paramedian mid abdomen suggesting a focal stricture. There is no evidence of fistula formation. Crohn's disease would be a differential diagnostic consideration. PELVIS:  There is no pelvic adenopathy or dependent free pelvic fluid. Darlynn Magic LYMPHATICS: Numerous small lymph nodes are clustered about the small bowel mesenteric root, and there are a few borderline prominent lymph nodes beneath the renal vascular pedicles. VASCULAR: There is no evidence of acute vascular pathology involving mesenteric or retroperitoneal great vessels. There is an inferior vena cava filter. SKELETAL: Mild levoscoliotic curvature and degenerative changes of the lumbar spine are noted. There are bilateral hip arthroplasty elements without acute complications. There is persistent small bowel mural thickening in the midabdomen involving the jejunum and proximal to mid ileum, with an apparent stenosis or focal stricture at about the level of the renal vascular pedicles and inferior vena cava filter in the right paramedian anterior abdomen. There is no evidence of perforation or obstruction, however. There is a pigtail drainage catheter in the gallbladder fossa, which could be a cholecystostomy catheter or a drainage catheter in the gallbladder fossa following cholecystectomy. The gallbladder is present, it is completely collapsed. A cystic appearing structure is noted in the splenic bed, and could be a splenic remnant or a low-density spleen, not prominently enlarged. Xr Chest Portable    Result Date: 2020  Patient MRN:  [id number] : 1975 Age: 53 years Gender: Male Order Date:  2020 11:58 PM TECHNIQUE/NUMBER OF IMAGES/COMPARISON/CLINICAL HISTORY: Chest AP 1 image one view Comparison 10/17/2020. Central venous line placed. FINDINGS: Right internal jugular central venous catheter tip in SVC. There is no pneumothorax on the right breast. Lungs are clear. Heart and small size, mediastinum unremarkable. There is no perihilar vascular congestion. No acute cardiopulmonary process. Xr Chest Portable    Result Date: 9/17/2020  Single frontal projection (one view) of the chest. COMPARISON: August 6, 2020 FINDINGS: The heart, lungs, mediastinum and regional skeleton are unremarkable. The costophrenic angles are sharp and clear. There is no evidence of mediastinal shift. The heart is not enlarged. There is no evidence of hilar adenopathy. Lungs are negative for evidence of acute airspace consolidation, effusion, atelectasis, pneumothorax, pathologic nodularity or interstitial thickening. Regional bone density and trabecular pattern are normal.     Negative one view chest.    Cta Pulmonary W Contrast    Result Date: 10/7/2020  EXAMINATION: CTA OF THE CHEST 10/7/2020 2:48 pm TECHNIQUE: CTA of the chest was performed after the administration of intravenous contrast.  Multiplanar reformatted images are provided for review. MIP images are provided for review. Dose modulation, iterative reconstruction, and/or weight based adjustment of the mA/kV was utilized to reduce the radiation dose to as low as reasonably achievable. COMPARISON: September 17, 2020 HISTORY: ORDERING SYSTEM PROVIDED HISTORY: DVT, tachy, r/o PE TECHNOLOGIST PROVIDED HISTORY: Reason for exam:->DVT, tachy, r/o PE What reading provider will be dictating this exam?->CRC FINDINGS: There is a filling defect present within posterior right lower lobe segmental pulmonary artery. Subtle filling defect is present within lingular segmental pulmonary artery as seen on axial image number 125. No evidence of saddle embolism. The heart is normal in size. No pericardial effusion. There is no mediastinal or hilar lymphadenopathy. There are minimal areas of subsegmental atelectasis in lung bases. There is minimal new left pleural effusion.   No evidence peripheral infarction are identified. There is no evidence of right heart strain. Lung window images show no evidence of organized pneumonia or mass lesions. Soft tissue window images show no evidence of mediastinal adenopathy, and the thoracic aorta shows no acute complications. Bone window images show no acute chest wall traumatic findings. Upper abdominal images show no evidence of acute visceral pathology. Diffuse fatty change of the liver is noted. The patient appears to be post cholecystectomy with a pigtail drain in the gallbladder fossa. The adrenals, kidneys, and included bowel structures are unremarkable for some mural thickening of small bowel in the left upper abdomen that could indicate mild colitis. Diverticuli are noted in the left hemicolon. Bilateral central and dependent pulmonary emboli without peripheral infarcts No evidence of pneumonia, pulmonary mass lesions, or cardiac decompensation. Fatty change of the liver, and there is a pigtail drainage catheter in the gallbladder fossa. Upper abdominal bowel loops show some mural thickening, which is nonspecific, but could indicate enteritis. ALERT:  THIS IS AN ABNORMAL REPORT-bilateral lower lobe central acute pulmonary emboli without complicating right heart strain, pleural effusion, or pulmonary infarcts.  Note: The emergency department was contacted telephonically by myself at the time of this dictation communicate findings    Us Dup Lower Extremity Left Jorge    Result Date: 10/7/2020  Patient MRN:  [id number] : 1975 Age: 53 years Gender: Male Order Date:  10/7/2020 2:16 PM EXAM: US DUP LOWER EXTREMITY LEFT JORGE NUMBER OF IMAGES:  25 INDICATION:  leg pain, swelling, r/o DVT leg pain, swelling, r/o DVT What reading provider will be dictating this exam?->[facility] COMPARISON: None There is evidence for deep venous thrombosis, which is occlusive in the left iliac common femoral and profunda superficial femoral and tibial peroneal trunk and the anterior and posterior tibial veins There is otherwise good compressibility, there is good augmentation, there is good color flow. There is evidence for a large deep venous thrombosis involving the entire left leg from the left iliac to the level of the trifurcation trifurcation veins below the knee ALERT:  THIS IS AN ABNORMAL REPORT     Xr Chest Abdomen Ng Placement    Result Date: 2020  Patient MRN: [id number] : 1975 Age:  53 years Gender: Male Order Date: 2020 5:35 AM Exam: XR CHEST ABDOMEN NG PLACEMENT Number of Images: 1 view Indication:   NG placement NG placement Comparison: Prior study from 2020 is available Findings: Study demonstrate lower chest upper abdomen demonstrated the nasogastric tube to be in satisfactory position with the tip in the body of the stomach. There is an IVC filter noted to be in place. There is a right internal jugular catheter with tip in the superior vena cava. The abdomen gas pattern is nonspecific. Nasogastric tube in satisfactory position Other findings as described above     Resident's Assessment and Plan     Alicia Rodriguez II is a 53 y.o. male with a PMHx of bilateral PE 2020 s/p IVC filter on 2020, HTN, DM2 insulin-dependent, and HLD initially presented with left leg pain and swelling for 1 week. Home and complained of left leg pain is progressive swelling for 1 week. He is on Lovenox injections for previous PE and splenic thrombosis. CTA showed small filling defect in the right lower lobe consistent with chronic PE.  DVT ultrasound showed large left DVT. Patient was then admitted to [facility] on 10/7 and underwent EKOS on 10/8 per Vascular Surgery. On 10/9, he was found to be COVID-19 positive and was transferred to the MICU. He denies any recent fevers, chills, cough, shortness of breath, chest pain, or sick contacts.     1. COVID 19+  - COVID-19 positive on 10/9  - Patient is not in acute respiratory distress and is currently asymptomatic  - Afebrile since admission  - Currently on room air, saturating well 97-98%  - Continue to monitor respiratory status  - Continue isolation     2. Left lower extremity DVT s/p EKOS on 10/8/2020  - Has history of bilateral PE on 6/2020 s/p IVC filter on 9/5/2020, for Lovenox subcutaneously twice daily at nursing home  - CTA chest on 10/7 showed small filling defect in right lower lung lobe consistent with chronic PE  - DVT ultrasound on 10/7 showed large left DVT  - Vascular surgery following, appreciate recommendations  - Continue tPA/heparin via EKOS  - Continue Ancef 2 g every 8 hours per vascular surgery  - For repeat angiography today per vascular surgery  - Monitor H&H, aPTT, fibrinogen every 6 hours  - Monitor for signs of bleeding    3. Sinus tachycardia  - -110  - EKG on 10/7 showed sinus tachycardia  - Continue Lopressor  - Continue to monitor heart rate    4. Iron deficiency anemia  - Hgb stable at 7.8  - Ferritin 233, iron 24, iron saturation 17, TIBC 145  - Continue ferrous sulfate 325 mg twice daily    5. 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 History of hypertension  - BP stable  - Continue Lopressor 25 mg twice daily  - Continue to monitor BP    7. History of type 2 diabetes mellitus, insulin-dependent  - Continue Lantus 25 units daily with LDSS  - Continue to monitor blood sugar    8. History of hyperlipidemia  - Continue home cholestyramine, fenofibrate, pravastatin    Code Status: Full code  DVT ppx: tPA/heparin  GI ppx: Protonix    [facility]    Tho Deng MD, PGY-1   Attending physician: Dr. Akhil Navarrete personally saw, examined and provided care for the patient. Radiographs, labs and medication list were reviewed by me independently. I spoke with bedside nursing, therapists and consultants.  Critical care services and times documented are independent of procedures and multidisciplinary rounds with Residents. Additionally comprehensive, multidisciplinary rounds were conducted with the MICU team. The case was discussed in detail and plans for care were established. Review of Residents documentation was conducted and revisions were made as appropriate. I agree with the above documented exam, problem list and plan of care.     [address]

## 2020-10-10 NOTE — PROGRESS NOTES
Progress Note  Date:10/10/2020       CDPD:3725/5612-L  Patient Name:Fausto Del Angel II     YOB: 1975     Age:44 y.o. Patient seen for follow up of DVT as well as COVID-19. Patient continues to have left lower extremity pain. He denies any chest pains, shortness of breath, nausea, vomiting or diarrhea. Subjective    Subjective:  Symptoms:  No shortness of breath, cough, chest pain, headache, chest pressure or diarrhea. Diet:  No nausea or vomiting. Pain:  He complains of pain that is moderate. Pain is partially controlled. Review of Systems   Respiratory: Negative for cough and shortness of breath. Cardiovascular: Negative for chest pain. Gastrointestinal: Negative for diarrhea, nausea and vomiting. Objective         Vitals Last 24 Hours:  TEMPERATURE:  Temp  Av.7 °F (36.5 °C)  Min: 95.7 °F (35.4 °C)  Max: 98.7 °F (37.1 °C)  RESPIRATIONS RANGE: Resp  Av.2  Min: 10  Max: 23  PULSE OXIMETRY RANGE: SpO2  Av.3 %  Min: 76 %  Max: 98 %  PULSE RANGE: Pulse  Av.2  Min: 99  Max: 115  BLOOD PRESSURE RANGE: Systolic (03VOP), IXP:333 , Min:98 , ZUM:517   ; Diastolic (12EAW), GQ, Min:65, Max:80    I/O (24Hr): Intake/Output Summary (Last 24 hours) at 10/10/2020 0820  Last data filed at 10/10/2020 0600  Gross per 24 hour   Intake 2270 ml   Output 900 ml   Net 1370 ml     Objective:  General Appearance:  Comfortable, well-appearing and in no acute distress. Vital signs: (most recent): Blood pressure 117/82, pulse 109, temperature 97.7 °F (36.5 °C), temperature source Axillary, resp. rate 13, height 6' 1\" (1.854 m), weight 264 lb 15.9 oz (120.2 kg), SpO2 98 %. Lungs:  Normal effort and normal respiratory rate. Breath sounds clear to auscultation. No rales or rhonchi. Heart: Normal rate. Regular rhythm. S1 normal and S2 normal.  No friction rub. Abdomen: Abdomen is soft and non-distended. Bowel sounds are normal.   There is no abdominal tenderness. There is no rebound tenderness. There is no guarding. Extremities: Normal range of motion. There is dependent edema (LEFT lower extremity. ). Skin:  Warm and dry. Labs/Imaging/Diagnostics    Labs:  CBC:  Recent Labs     10/09/20  1815 10/10/20  0030 10/10/20  0405   WBC 14.3* 14.9* 14.3*   RBC 2.88* 2.91* 3.11*   HGB 7.8* 7.9* 8.5*   HCT 25.4* 25.8* 27.5*   MCV 88.2 88.7 88.4   RDW 17.9* 18.2* 18.0*   * 513* 523*     CHEMISTRIES:  Recent Labs     10/08/20  0315 10/09/20  0249 10/10/20  0405 10/10/20  0519    140 136  --    K 3.5 3.5 3.5  --     103 99  --    CO2 25 22 21*  --    BUN 10 8 8  --    CREATININE 0.8 0.7 0.7  --    GLUCOSE 111* 99 69*  --    PHOS  --   --   --  2.9   MG 1.6  --   --  1.6     PT/INR:  Recent Labs     10/07/20  1318 10/10/20  0405   PROTIME 14.2* 16.3*   INR 1.3 1.4     APTT:  Recent Labs     10/09/20  1230 10/09/20  1815 10/10/20  0405   APTT 47.5* 38.7* 36.1*     LIVER PROFILE:  Recent Labs     10/07/20  1318   AST 12   ALT 13   BILITOT <0.2   ALKPHOS 171*       Imaging Last 24 Hours:  No results found. Assessment//Plan           Hospital Problems           Last Modified POA    Deep vein thrombosis (DVT) present on admission [facility]) 10/7/2020 Yes              Assessment & Plan  1. Left lower extremity DVT  2. COVID-19   3. PE  4. Iron Deficiency anemia  5. Diabetes Mellitus Type II   6. Hypertension    Vascular and hematology following for DVT. Patient currently on Heparin. Monitor hemoglobin closely. Monitor sugars closely.      Darian Jackson DO    Electronically signed by Darian Jackson DO on 10/10/20 at 8:20 AM EDT

## 2020-10-10 NOTE — PROGRESS NOTES
[facility]  Hematology/Oncology  Consult      Patient Name: Caesar Santos II  YOB: 1975  PCP: IGOR Ware CNP   Referring Provider:      Reason for Consultation:   Chief Complaint   Patient presents with    Leg Pain     had his spleen removed 2 weeks ago, received Lovenox injections daily at the [facility]. Having left groin and leg pain. Sent in to r/o DVT        Subjective  Patient transferred to MICU in isolation + COVID-19. History of Present Illness: This is a 78-year-old male patient of Dr. Rj Jiménez who is being seen for thrombocytosis and moderate normocytic anemia. He also has a past medical history of diabetes mellitus type 2, hyperlipidemia, bilateral PE with IVC filter, hypertension, and GI bleed. 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 His platelets were 1.1 million. His out patient work up showed JAK2/CALR/MPL all to be negative. His B12 and folate were within normal limits. He had elevated ferritin and low serum iron with normal TIBC. He was started on Hydrea 500 mg twice daily as well as an aspirin 81 mg. He [facility] emergency room [facility] the [facility] for left leg swelling. His platelet count was 106 and Hgb 8.3. He had a venous Doppler of the left extremity which showed evidence for a large deep venous thrombosis involving the entire left leg from the left iliac to the level of the trifurcation trifurcation veins below the knee. He was started on a heparin drip and admitted for further evaluation.       Diagnostic Data:     Past Medical History:   Diagnosis Date    Accident 11/2019    stepped on nail rt ft about 1 month ago- healed per patient    SIMÓN (acute kidney injury) ([facility] [address] 2008 apx    kidney bruised due to fall / Cris Smiles    Allergic rhinitis     Chronic back pain     Depression     Diabetes mellitus ([address]     Difficulty sleeping     at times    Displacement of lumbar intervertebral disc without myelopathy     Fibromyalgia     Fractured rib     2008 / healed    H/O seasonal allergies     Head injury 1980'S apx    no residual s/s    Hyperlipidemia     Hypertension     Obesity (BMI 35.0-39.9 without comorbidity)     bmi 39.2  weight 296 #    Osteoarthritis     Thoracic or lumbosacral neuritis or radiculitis, unspecified        Patient Active Problem List    Diagnosis Date Noted    Deep vein thrombosis (DVT) present on admission ([address] 10/07/2020    SIMÓN (acute kidney injury) ([address] 09/17/2020    Septicemia ([address] 09/17/2020    Intra-abdominal infection 09/17/2020    Acute GI bleeding 09/03/2020    Acute blood loss anemia     Thrombocytosis ([address] 08/10/2020    Decubitus ulcer of sacral area 08/01/2020    Abnormal findings on diagnostic imaging of gall bladder 08/01/2020    Splenic infarction 08/01/2020    Cyst of spleen 08/01/2020    Splenic abscess 08/01/2020    Anemia 08/01/2020    Other pulmonary embolism without acute cor pulmonale (HCC) 08/01/2020    Enterocolitis 07/31/2020    Rectus diastasis 11/01/2019    Recurrent unilateral inguinal hernia 11/01/2019    Cellulitis of sacral region 07/02/2019    Cellulitis of foot 06/30/2019    Neuropathy 06/30/2019    Cellulitis, wound, post-operative 06/30/2019    Left leg swelling 06/30/2019    SIRS (systemic inflammatory response syndrome) ([address] 06/30/2019    Primary osteoarthritis of right hip 11/03/2016    Primary osteoarthritis of left hip 04/11/2016    Type 2 diabetes mellitus ([address] 04/08/2016    Lumbar disc herniation 02/22/2016    Lumbar radiculopathy 12/17/2015    Osteoarthritis of spine with radiculopathy, lumbar region 12/17/2015    Class 1 obesity in adult 12/17/2015    Allergic rhinitis 11/23/2015    Essential hypertension 10/16/2015    Vitamin D deficiency 04/14/2015    Fibromyalgia 12/15/2014    Insomnia 07/04/2014    Osteoarthritis 03/27/2014    Lumbar spondylosis 01/07/2014    Neuropathic pain 05/08/2012        Past Surgical History:   Procedure Laterality Date    COLONOSCOPY N/A 9/5/2020    COLONOSCOPY WITH BIOPSY performed by Vickie Garcia MD at [address] PTC NEW ACCESS  8/3/2020    CT PTC NEW ACCESS 8/3/2020 [facility] CT    FOOT SURGERY Right 1985    to treat shattered bones    HERNIA REPAIR  2001    DOUBLE HERNIA    HERNIA REPAIR Right 12/9/2019    LAPAROSCOPIC RIGHT INGUINAL HERNIA REPAIR, MESH 10x15 cm PLACEMENT performed by David Arroyo MD at [facility] Left 4/11/2016    LAPAROTOMY N/A 9/18/2020    LAPAROTOMY EXPLORATORY, CHOLECYSTECTOMY, BOWEL RESECTION, right darian colectomy, partial omentectomy, and splenectomy performed by Vickie Garcia MD at [facility] COLONOSCOPY FLX DX W/COLLJ [address] PFRMD N/A 5/7/2018    COLONOSCOPY DIAGNOSTIC performed by Jef Guevara MD at [address] [facility] Left 2014    Dr. Arva Duverney ARTHROSCOPY Left 11 21 14    SHOULDER SURGERY  2001 &2007    RIGHT AND LEFT repair of tears    TOTAL HIP ARTHROPLASTY Right 10/31/2016    Total right hip  Kal Lo MD    UPPER GASTROINTESTINAL ENDOSCOPY N/A 9/4/2020    EGD DIAGNOSTIC ONLY performed by Rosemarie Cortez MD at [facility] 1  2007    LEFT WRIST       Family History  Family History   Problem Relation Age of Onset    Hypertension Mother     Arthritis Father     Diabetes Father     Cancer Maternal Grandfather         Skin    Cancer Paternal Uncle         skin    Diabetes Paternal Grandfather     Heart Disease Maternal Grandmother     Stroke Maternal Aunt        Social History    TOBACCO:   reports that he has never smoked.  His smokeless tobacco use includes chew.  ETOH:   reports previous alcohol use. Home Medications  Prior to Admission medications    Medication Sig Start Date End Date Taking?  Authorizing Provider   cholestyramine (QUESTRAN) 4 g packet Take 1 packet by mouth 2 times daily 9/26/20   Jami Peguero MD   glucose (GLUTOSE) 40 % GEL Take 37.5 mLs by mouth as needed (low sugar) 9/26/20   Jami Peguero MD   insulin glargine (LANTUS) 100 UNIT/ML injection vial Inject 25 Units into the skin Daily 9/27/20   Jami Peguero MD   enoxaparin (LOVENOX) 40 MG/0.4ML injection Inject 0.4 mLs into the skin daily 9/27/20   Jami Peguero MD   metoprolol tartrate (LOPRESSOR) 25 MG tablet Take 1 tablet by mouth 2 times daily 9/11/20   Denisha Hargrove MD   melatonin 3 MG TABS tablet Take 3 mg by mouth nightly as needed (sleep)    Historical Provider, MD   fluticasone (FLONASE) 50 MCG/ACT nasal spray 2 sprays by Nasal route daily    Historical Provider, MD   cyclobenzaprine (FLEXERIL) 10 MG tablet Take 10 mg by mouth three times daily    Historical Provider, MD   fenofibrate (TRICOR) 54 MG tablet Take 54 mg by mouth daily    Historical Provider, MD   ferrous sulfate (IRON 325) 325 (65 Fe) MG tablet Take 325 mg by mouth 2 times daily    Historical Provider, MD   hydroxyurea (HYDREA) 500 MG chemo capsule Take 500 mg by mouth 2 times daily    Historical Provider, MD   loperamide (IMODIUM) 2 MG capsule Take 2 mg by mouth 4 times daily as needed for Diarrhea    Historical Provider, MD   insulin lispro (HUMALOG) 100 UNIT/ML injection vial Inject 3 Units into the skin 3 times daily (before meals) Injects 3 units three times daily before meals in addition to following sliding scale  : 0 units  141-180: 1 unit  181-220: 2 units  221-260: 3 units  261-300: 4 units  301-340: 5 units  >341: 6 units and call MD    Historical Provider, MD   pantoprazole (PROTONIX) 40 MG tablet Take 40 mg by mouth daily    Historical Provider, MD   DULoxetine (CYMBALTA) 30 MG extended release AST 12 10/07/2020    ALT 13 10/07/2020    LABGLOM >60 10/10/2020    GFRAA >60 10/10/2020       Lab Results   Component Value Date    IRON 24 (L) 2020    TIBC 145 (L) 2020    FERRITIN 539 10/10/2020           Radiology-    Ct Abdomen Pelvis W Iv Contrast Additional Contrast? None    Result Date: 2020  Patient MRN:  [id number] : 1975 Age: 53 years Gender: Male Order Date:  2020 4:51 PM EXAM: CT ABDOMEN PELVIS W IV CONTRAST NUMBER OF IMAGES \ views:  580 INDICATION: Dizziness, abdominal pain COMPARISON: Contemporaneous CTA chest study was performed, CT abdomen pelvis with contrast 449 TECHNIQUE: Helical imaging was extended from the lower chest to the lower pelvis in conjunction with the intravenous administration of 90 mL of Isovue-370, and axial images were supplemented with sagittal and coronal MPR images. Low-dose CT  acquisition technique included one of following options; 1 . Automated exposure control, 2. Adjustment of MA and or KV according to patient's size or 3. Use of iterative reconstruction. FINDINGS: CHEST: Contemporaneous CTA imaging documented bilateral pulmonary emboli without right heart strain or acute pulmonary or pleural complications. LIVER: The liver is uniform in parenchymal density, and no focal lesions are identified. There is diffuse fatty change. GALLBLADDER AND BILIARY TREE: There is a pigtail drainage catheter in the gallbladder fossa, and it is uncertain if this is a post cholecystectomy drain or if this is a cholecystostomy associated with complete collapse of the gallbladder. There is no biliary ductal ectasia. SPLEEN: A circumscribed hypodense structure in the splenic bed could be a hypodense or infarcted spleen, but shows no evidence of extracapsular fluid or inflammatory change. The finding measures about 10 cm length by about 7 cm in thickness.  It is uncertain if this represents residual following splenectomy, but there is no mention in the electronic record of splenectomy. . ADRENALS:  The adrenals are normal in appearance bilaterally. PANCREAS:The pancreas shows no evidence of acute inflammation or mass lesions. KIDNEYS/BLADDER: The kidneys show uniform cortical enhancement and no evidence of outflow obstruction. There is no perinephric inflammatory change. Ureters are similar in course and caliber, and the bladder is normal in appearance. APPENDIX:  Normal BOWEL:  There is no evidence of inflammation, obstruction, or perforation of enteric structures. There is some fluid filled distal ileum, and the previously identified thickening of jejunal loops in the left upper abdomen is again documented. The appearance suggests enteritis with focal stenosis in the anterior right paramedian mid abdomen suggesting a focal stricture. There is no evidence of fistula formation. Crohn's disease would be a differential diagnostic consideration. PELVIS:  There is no pelvic adenopathy or dependent free pelvic fluid. Hitesh Rathke LYMPHATICS: Numerous small lymph nodes are clustered about the small bowel mesenteric root, and there are a few borderline prominent lymph nodes beneath the renal vascular pedicles. VASCULAR: There is no evidence of acute vascular pathology involving mesenteric or retroperitoneal great vessels. There is an inferior vena cava filter. SKELETAL: Mild levoscoliotic curvature and degenerative changes of the lumbar spine are noted. There are bilateral hip arthroplasty elements without acute complications. There is persistent small bowel mural thickening in the midabdomen involving the jejunum and proximal to mid ileum, with an apparent stenosis or focal stricture at about the level of the renal vascular pedicles and inferior vena cava filter in the right paramedian anterior abdomen. There is no evidence of perforation or obstruction, however.  There is a pigtail drainage catheter in the gallbladder fossa, which could be a cholecystostomy catheter or a drainage catheter in the gallbladder fossa following cholecystectomy. The gallbladder is present, it is completely collapsed. A cystic appearing structure is noted in the splenic bed, and could be a splenic remnant or a low-density spleen, not prominently enlarged. Xr Chest Portable    Result Date: 2020  Patient MRN:  [id number] : 1975 Age: 53 years Gender: Male Order Date:  2020 11:58 PM TECHNIQUE/NUMBER OF IMAGES/COMPARISON/CLINICAL HISTORY: Chest AP 1 image one view Comparison 10/17/2020. Central venous line placed. FINDINGS: Right internal jugular central venous catheter tip in SVC. There is no pneumothorax on the right breast. Lungs are clear. Heart and small size, mediastinum unremarkable. There is no perihilar vascular congestion. No acute cardiopulmonary process. Xr Chest Portable    Result Date: 2020  Single frontal projection (one view) of the chest. COMPARISON: 2020 FINDINGS: The heart, lungs, mediastinum and regional skeleton are unremarkable. The costophrenic angles are sharp and clear. There is no evidence of mediastinal shift. The heart is not enlarged. There is no evidence of hilar adenopathy. Lungs are negative for evidence of acute airspace consolidation, effusion, atelectasis, pneumothorax, pathologic nodularity or interstitial thickening. Regional bone density and trabecular pattern are normal.     Negative one view chest.    Cta Pulmonary W Contrast    Result Date: 10/7/2020  EXAMINATION: CTA OF THE CHEST 10/7/2020 2:48 pm TECHNIQUE: CTA of the chest was performed after the administration of intravenous contrast.  Multiplanar reformatted images are provided for review. MIP images are provided for review. Dose modulation, iterative reconstruction, and/or weight based adjustment of the mA/kV was utilized to reduce the radiation dose to as low as reasonably achievable.  COMPARISON: 2020 HISTORY: ORDERING SYSTEM PROVIDED HISTORY: DVT, tachy, r/o PE TECHNOLOGIST PROVIDED HISTORY: Reason for exam:->DVT, tachy, r/o PE What reading provider will be dictating this exam?->CRC FINDINGS: There is a filling defect present within posterior right lower lobe segmental pulmonary artery. Subtle filling defect is present within lingular segmental pulmonary artery as seen on axial image number 125. No evidence of saddle embolism. The heart is normal in size. No pericardial effusion. There is no mediastinal or hilar lymphadenopathy. There are minimal areas of subsegmental atelectasis in lung bases. There is minimal new left pleural effusion. No evidence of pneumonia. There is no pneumothorax. View of the upper abdomen shows normal bilateral adrenal glands. Postsurgical changes with areas of subcutaneous emphysema are associated with subxiphoid anterior abdominal wall. Postsurgical changes are also present within upper abdomen below margin of the liver. 1.  Small filling defect is located within right lower lobe segmental pulmonary artery and small filling defect is present within lingular segmental pulmonary artery. These findings are related to chronic bilateral pulmonary emboli. [place] of pulmonary embolus has decreased compared to prior from 2020. 2.  New minimal left pleural effusion. 3. Foci of gas are associated with the subxiphoid abdominal wall as well as areas of inflammation located in right upper abdomen below margin of liver. Clinical correlation recommended for recent surgery. Critical results were called by Dr. Irma Ku to JOHN Sunshine on 10/7/2020 at 15:06. Cta Pulmonary W Contrast    Result Date: 2020  Patient MRN:  [id number] : 1975 Age: 53 years Gender: Male Order Date:  2020 4:51 PM EXAM: CTA PULMONARY W CONTRAST NUMBER OF IMAGES:  480 INDICATION: Dizziness, cholecystostomy drain COMPARISON: None Technique: Low-dose CT  acquisition technique included one of following options; 1 .  Automated exposure control, 2. Adjustment of MA and or KV according to patient's size or 3. Use of iterative reconstruction. Contiguous spiral images were obtained in the axial plane, following the administration of 90 mL of Isovue-370 intravenous contrast using CT angiographic protocol. Sagittal and coronal images were reconstructed from the axial plane acquisition. Addition MIP reconstructions were presented to aid in the interpretation of this study. Findings: The central pulmonary arterial tree shows acute emboli in the descending pulmonary arteries bilaterally, but no areas of regional oligemia, pleural effusion, or peripheral infarction are identified. There is no evidence of right heart strain. Lung window images show no evidence of organized pneumonia or mass lesions. Soft tissue window images show no evidence of mediastinal adenopathy, and the thoracic aorta shows no acute complications. Bone window images show no acute chest wall traumatic findings. Upper abdominal images show no evidence of acute visceral pathology. Diffuse fatty change of the liver is noted. The patient appears to be post cholecystectomy with a pigtail drain in the gallbladder fossa. The adrenals, kidneys, and included bowel structures are unremarkable for some mural thickening of small bowel in the left upper abdomen that could indicate mild colitis. Diverticuli are noted in the left hemicolon. Bilateral central and dependent pulmonary emboli without peripheral infarcts No evidence of pneumonia, pulmonary mass lesions, or cardiac decompensation. Fatty change of the liver, and there is a pigtail drainage catheter in the gallbladder fossa. Upper abdominal bowel loops show some mural thickening, which is nonspecific, but could indicate enteritis. ALERT:  THIS IS AN ABNORMAL REPORT-bilateral lower lobe central acute pulmonary emboli without complicating right heart strain, pleural effusion, or pulmonary infarcts.  Note: The emergency department was contacted telephonically by myself at the time of this dictation communicate findings    Us Dup Lower Extremity Left Jorge    Result Date: 10/7/2020  Patient MRN:  [id number] : 1975 Age: 53 years Gender: Male Order Date:  10/7/2020 2:16 PM EXAM: US DUP LOWER EXTREMITY LEFT JORGE NUMBER OF IMAGES:  25 INDICATION:  leg pain, swelling, r/o DVT leg pain, swelling, r/o DVT What reading provider will be dictating this exam?->[facility] COMPARISON: None There is evidence for deep venous thrombosis, which is occlusive in the left iliac common femoral and profunda superficial femoral and tibial peroneal trunk and the anterior and posterior tibial veins There is otherwise good compressibility, there is good augmentation, there is good color flow. There is evidence for a large deep venous thrombosis involving the entire left leg from the left iliac to the level of the trifurcation trifurcation veins below the knee ALERT:  THIS IS AN ABNORMAL REPORT     Xr Chest Abdomen Ng Placement    Result Date: 2020  Patient MRN: [id number] : 1975 Age:  53 years Gender: Male Order Date: 2020 5:35 AM Exam: XR CHEST ABDOMEN NG PLACEMENT Number of Images: 1 view Indication:   NG placement NG placement Comparison: Prior study from 2020 is available Findings: Study demonstrate lower chest upper abdomen demonstrated the nasogastric tube to be in satisfactory position with the tip in the body of the stomach. There is an IVC filter noted to be in place. There is a right internal jugular catheter with tip in the superior vena cava. The abdomen gas pattern is nonspecific.      Nasogastric tube in satisfactory position Other findings as described above         ASSESSMENT/PLAN :  54 yo male  BL PE s/p IVC  Hx GI bleed  S/p ex lap with cholecystectomy and R hemicolectomy with small bowel resection and side-to-side ileocolonic anastomosis with splenic thrombosis and infarction s/p splenectomy and omentectomy    - LLE DVT, extensive on US  - Likely provoked in nature due to immobility/stasis. Prophylactic dose but he has multiple risk factors  - Seen by vascular, s/p EKOS. Plan for take back to angio tomorrow  - Case discussed with Dr. Karen Aguilera. We are both in agreement that given his prior surgical intervention, bleeding risk will be low and benefits outweigh risks in regard to DC on full dose oral AC with NOAC (Eliquis)  - Trend CBC  - Will follow    10/9/20  - Patient had repeat angio today which showed residual clot. The catheter was repositioned. currently on TPN heparin.   - Vascular surg following   - Follow-up angio scheduled for tomorrow  - Plts 477 and WBCs 16.8 trending down Hgb 7.6 stable     10/10/20  - Angiogram today  - WBCs 14.3 Hgb 8.5  Plts 523   - AC on DC as above    IGOR Saunders - CNP  Electronically signed 10/10/2020 at 11:02 AM  Patient seen and examined personally.  Note edited to reflect my updates  Isabela Hammond MD

## 2020-10-10 NOTE — OP NOTE
Cardiovascular Lab Procedure Report    Eli Marrufo  1975    Date : 10/10/2020  Surgeon: Parker Jay M.D. 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's Syndrome  Procedure:       69529 Removal of EKOS lysis catheter   89862 IVUS of IVC,   IVUS of Left Common and external iliac, common femoral, superficial femoral and popliteal veins   87795  12934 Left Common Iliac Vein Stent (18 x 160 ) (BD - Venovo)  Left external iliac and common femoral vein stent 16x60 BD Venovo  Post dilation with 16 x 4 ballloon ([place])     Anesthesia: Local with IV sedation  Assistants: Cath Lab Staff  Estimated Blood Loss: Minimal  Complications: none  Findings[de-identified]    Left    Inferior Vena Cava Filter with significant residual thrombus   Common Iliac Vein Stenosis, some residual thrombus  S/p stent widelyl patent, some residual thrombus   External Iliac Vein Significant residual thrombuss/p stent patent with no significant residual thrombus   Internal Iliac Vein Occluded   Common Femoral Vein Much improved but some residual thrombus - s/p stent minimal residual thrombus   Superficial femoral vein Improved but significant residual thrombus   Popliteal vein Large amount of residual thrombus     Procedure Details :  Timeout preformed identifying pt and procedure. Left popliteal region and previous sheath and catheter prepped and draped in sterile fashion. Patient given sedation as needed throughout the case. Advantage glide wire placed and venogram preformed of Left lower extremity and inferior vena cava. Patient was given heparin bolus 5000 units. A 10 fr sheath was exchanged and than the IVUS catheter was placed over the wire into the IVC and than pulled across the  Left iliac vein down to the level of the popliteal vein. At the level of the Left common iliac vein stenosis was noted.   On IVUS the right common iliac artery did appear to be compression the left common iliac vein. Measurements of the vein were done. It was felt that the patient did have May Thurner's Syndrome. As the patient had exam consistent with May Thurner's Syndrome with left common iliac vein compression decision was made to proceed with venous stenting. A 18 x 160 stent was placed in the left common iliac vein. Extended with 16x60 Venovo stent into the left external iliac and common femoral with appropriate overalp. It was post dilated with 16 x 4 balloon. IVUS replaced noting widely patent stented Left iliac vein. There was significant residual thrombus noted, in particular in the filter    Decision was made to place new ekos catheter - 50 cm going from ~ 5 cm above the filter to the superficial femoral vein. Sheath sutured into place. Dressing applied.     Pt tolerated procedure well    Plan  TPA, heparin, saline drips will be restarted  Will take back for fu imaging amanda Townsend

## 2020-10-11 ENCOUNTER — ANESTHESIA (OUTPATIENT)
Dept: OPERATING ROOM | Age: 45
DRG: 182 | End: 2020-10-11
Payer: COMMERCIAL

## 2020-10-11 ENCOUNTER — APPOINTMENT (OUTPATIENT)
Dept: GENERAL RADIOLOGY | Age: 45
DRG: 182 | End: 2020-10-11
Payer: COMMERCIAL

## 2020-10-11 VITALS
RESPIRATION RATE: 12 BRPM | OXYGEN SATURATION: 98 % | DIASTOLIC BLOOD PRESSURE: 81 MMHG | SYSTOLIC BLOOD PRESSURE: 129 MMHG

## 2020-10-11 LAB
ABO/RH: NORMAL
ANION GAP SERPL CALCULATED.3IONS-SCNC: 13 MMOL/L (ref 7–16)
ANTIBODY SCREEN: NORMAL
APTT: 36.3 SEC (ref 24.5–35.1)
APTT: 40.1 SEC (ref 24.5–35.1)
BLOOD BANK DISPENSE STATUS: NORMAL
BLOOD BANK DISPENSE STATUS: NORMAL
BLOOD BANK PRODUCT CODE: NORMAL
BLOOD BANK PRODUCT CODE: NORMAL
BPU ID: NORMAL
BPU ID: NORMAL
BUN BLDV-MCNC: 5 MG/DL (ref 6–20)
CALCIUM SERPL-MCNC: 8.1 MG/DL (ref 8.6–10.2)
CHLORIDE BLD-SCNC: 103 MMOL/L (ref 98–107)
CO2: 23 MMOL/L (ref 22–29)
CREAT SERPL-MCNC: 0.6 MG/DL (ref 0.7–1.2)
DESCRIPTION BLOOD BANK: NORMAL
DESCRIPTION BLOOD BANK: NORMAL
FIBRINOGEN: 306 MG/DL (ref 225–540)
FIBRINOGEN: 328 MG/DL (ref 225–540)
GFR AFRICAN AMERICAN: >60
GFR NON-AFRICAN AMERICAN: >60 ML/MIN/1.73
GLUCOSE BLD-MCNC: 93 MG/DL (ref 74–99)
HCT VFR BLD CALC: 20.1 % (ref 37–54)
HCT VFR BLD CALC: 27.5 % (ref 37–54)
HCT VFR BLD CALC: 27.7 % (ref 37–54)
HEMOGLOBIN: 6.3 G/DL (ref 12.5–16.5)
HEMOGLOBIN: 8.9 G/DL (ref 12.5–16.5)
HEMOGLOBIN: 9.1 G/DL (ref 12.5–16.5)
INR BLD: 1.4
MAGNESIUM: 1.5 MG/DL (ref 1.6–2.6)
MCH RBC QN AUTO: 27 PG (ref 26–35)
MCH RBC QN AUTO: 28.1 PG (ref 26–35)
MCHC RBC AUTO-ENTMCNC: 31.3 % (ref 32–34.5)
MCHC RBC AUTO-ENTMCNC: 32.4 % (ref 32–34.5)
MCV RBC AUTO: 86.3 FL (ref 80–99.9)
MCV RBC AUTO: 86.8 FL (ref 80–99.9)
METER GLUCOSE: 127 MG/DL (ref 74–99)
METER GLUCOSE: 80 MG/DL (ref 74–99)
METER GLUCOSE: 97 MG/DL (ref 74–99)
PDW BLD-RTO: 16.8 FL (ref 11.5–15)
PDW BLD-RTO: 17.7 FL (ref 11.5–15)
PLATELET # BLD: 368 E9/L (ref 130–450)
PLATELET # BLD: 412 E9/L (ref 130–450)
PMV BLD AUTO: 8.8 FL (ref 7–12)
PMV BLD AUTO: 9.2 FL (ref 7–12)
POTASSIUM SERPL-SCNC: 3.4 MMOL/L (ref 3.5–5)
PROTHROMBIN TIME: 16.2 SEC (ref 9.3–12.4)
RBC # BLD: 2.33 E12/L (ref 3.8–5.8)
RBC # BLD: 3.17 E12/L (ref 3.8–5.8)
SODIUM BLD-SCNC: 139 MMOL/L (ref 132–146)
WBC # BLD: 11.4 E9/L (ref 4.5–11.5)
WBC # BLD: 12.3 E9/L (ref 4.5–11.5)

## 2020-10-11 PROCEDURE — 6370000000 HC RX 637 (ALT 250 FOR IP): Performed by: SURGERY

## 2020-10-11 PROCEDURE — 6360000002 HC RX W HCPCS: Performed by: SURGERY

## 2020-10-11 PROCEDURE — 2500000003 HC RX 250 WO HCPCS: Performed by: SURGERY

## 2020-10-11 PROCEDURE — 80048 BASIC METABOLIC PNL TOTAL CA: CPT

## 2020-10-11 PROCEDURE — 3700000000 HC ANESTHESIA ATTENDED CARE: Performed by: SURGERY

## 2020-10-11 PROCEDURE — 86850 RBC ANTIBODY SCREEN: CPT

## 2020-10-11 PROCEDURE — 2140000000 HC CCU INTERMEDIATE R&B

## 2020-10-11 PROCEDURE — 36430 TRANSFUSION BLD/BLD COMPNT: CPT

## 2020-10-11 PROCEDURE — 83735 ASSAY OF MAGNESIUM: CPT

## 2020-10-11 PROCEDURE — 85027 COMPLETE CBC AUTOMATED: CPT

## 2020-10-11 PROCEDURE — 3209999900 FLUORO FOR SURGICAL PROCEDURES

## 2020-10-11 PROCEDURE — 85730 THROMBOPLASTIN TIME PARTIAL: CPT

## 2020-10-11 PROCEDURE — 36415 COLL VENOUS BLD VENIPUNCTURE: CPT

## 2020-10-11 PROCEDURE — 2580000003 HC RX 258: Performed by: SURGERY

## 2020-10-11 PROCEDURE — 85384 FIBRINOGEN ACTIVITY: CPT

## 2020-10-11 PROCEDURE — 6360000004 HC RX CONTRAST MEDICATION: Performed by: SURGERY

## 2020-10-11 PROCEDURE — 82962 GLUCOSE BLOOD TEST: CPT

## 2020-10-11 PROCEDURE — 3600000003 HC SURGERY LEVEL 3 BASE: Performed by: SURGERY

## 2020-10-11 PROCEDURE — P9016 RBC LEUKOCYTES REDUCED: HCPCS

## 2020-10-11 PROCEDURE — 86900 BLOOD TYPING SEROLOGIC ABO: CPT

## 2020-10-11 PROCEDURE — 86923 COMPATIBILITY TEST ELECTRIC: CPT

## 2020-10-11 PROCEDURE — B5191ZZ FLUOROSCOPY OF INFERIOR VENA CAVA USING LOW OSMOLAR CONTRAST: ICD-10-PCS | Performed by: SURGERY

## 2020-10-11 PROCEDURE — 85014 HEMATOCRIT: CPT

## 2020-10-11 PROCEDURE — 6370000000 HC RX 637 (ALT 250 FOR IP): Performed by: INTERNAL MEDICINE

## 2020-10-11 PROCEDURE — 2580000003 HC RX 258: Performed by: INTERNAL MEDICINE

## 2020-10-11 PROCEDURE — 6360000002 HC RX W HCPCS: Performed by: NURSE ANESTHETIST, CERTIFIED REGISTERED

## 2020-10-11 PROCEDURE — 85018 HEMOGLOBIN: CPT

## 2020-10-11 PROCEDURE — 37214 CESSJ THERAPY CATH REMOVAL: CPT | Performed by: SURGERY

## 2020-10-11 PROCEDURE — 3600000013 HC SURGERY LEVEL 3 ADDTL 15MIN: Performed by: SURGERY

## 2020-10-11 PROCEDURE — B51C1ZZ FLUOROSCOPY OF LEFT LOWER EXTREMITY VEINS USING LOW OSMOLAR CONTRAST: ICD-10-PCS | Performed by: SURGERY

## 2020-10-11 PROCEDURE — 85610 PROTHROMBIN TIME: CPT

## 2020-10-11 PROCEDURE — 86901 BLOOD TYPING SEROLOGIC RH(D): CPT

## 2020-10-11 PROCEDURE — 3700000001 HC ADD 15 MINUTES (ANESTHESIA): Performed by: SURGERY

## 2020-10-11 RX ORDER — MIDAZOLAM HYDROCHLORIDE 1 MG/ML
INJECTION INTRAMUSCULAR; INTRAVENOUS PRN
Status: DISCONTINUED | OUTPATIENT
Start: 2020-10-11 | End: 2020-10-11 | Stop reason: SDUPTHER

## 2020-10-11 RX ORDER — POTASSIUM CHLORIDE 7.45 MG/ML
10 INJECTION INTRAVENOUS ONCE
Status: DISCONTINUED | OUTPATIENT
Start: 2020-10-11 | End: 2020-10-11

## 2020-10-11 RX ORDER — 0.9 % SODIUM CHLORIDE 0.9 %
20 INTRAVENOUS SOLUTION INTRAVENOUS ONCE
Status: COMPLETED | OUTPATIENT
Start: 2020-10-11 | End: 2020-10-11

## 2020-10-11 RX ORDER — HEPARIN SODIUM 1000 [USP'U]/ML
INJECTION, SOLUTION INTRAVENOUS; SUBCUTANEOUS PRN
Status: DISCONTINUED | OUTPATIENT
Start: 2020-10-11 | End: 2020-10-11 | Stop reason: ALTCHOICE

## 2020-10-11 RX ORDER — OXYCODONE HYDROCHLORIDE AND ACETAMINOPHEN 5; 325 MG/1; MG/1
1 TABLET ORAL EVERY 4 HOURS PRN
Status: DISCONTINUED | OUTPATIENT
Start: 2020-10-11 | End: 2020-10-12

## 2020-10-11 RX ORDER — FENTANYL CITRATE 50 UG/ML
INJECTION, SOLUTION INTRAMUSCULAR; INTRAVENOUS PRN
Status: DISCONTINUED | OUTPATIENT
Start: 2020-10-11 | End: 2020-10-11 | Stop reason: SDUPTHER

## 2020-10-11 RX ORDER — PROPOFOL 10 MG/ML
INJECTION, EMULSION INTRAVENOUS
Status: DISCONTINUED
Start: 2020-10-11 | End: 2020-10-11

## 2020-10-11 RX ORDER — POTASSIUM CHLORIDE 20 MEQ/1
20 TABLET, EXTENDED RELEASE ORAL 2 TIMES DAILY WITH MEALS
Status: DISCONTINUED | OUTPATIENT
Start: 2020-10-11 | End: 2020-10-15 | Stop reason: HOSPADM

## 2020-10-11 RX ADMIN — PRAVASTATIN SODIUM 20 MG: 20 TABLET ORAL at 21:08

## 2020-10-11 RX ADMIN — CYCLOBENZAPRINE 10 MG: 10 TABLET, FILM COATED ORAL at 08:12

## 2020-10-11 RX ADMIN — CHOLESTYRAMINE 4 G: 4 POWDER, FOR SUSPENSION ORAL at 21:09

## 2020-10-11 RX ADMIN — SODIUM CHLORIDE, PRESERVATIVE FREE 10 ML: 5 INJECTION INTRAVENOUS at 02:33

## 2020-10-11 RX ADMIN — ALTEPLASE 1 MG/HR: 2.2 INJECTION, POWDER, LYOPHILIZED, FOR SOLUTION INTRAVENOUS at 04:42

## 2020-10-11 RX ADMIN — HYDROXYUREA 500 MG: 500 CAPSULE ORAL at 08:12

## 2020-10-11 RX ADMIN — CYCLOBENZAPRINE 10 MG: 10 TABLET, FILM COATED ORAL at 19:56

## 2020-10-11 RX ADMIN — Medication 2 G: at 06:41

## 2020-10-11 RX ADMIN — FERROUS SULFATE TAB 325 MG (65 MG ELEMENTAL FE) 325 MG: 325 (65 FE) TAB at 08:11

## 2020-10-11 RX ADMIN — Medication 2 G: at 14:53

## 2020-10-11 RX ADMIN — METOPROLOL TARTRATE 25 MG: 25 TABLET, FILM COATED ORAL at 21:09

## 2020-10-11 RX ADMIN — APIXABAN 5 MG: 5 TABLET, FILM COATED ORAL at 13:14

## 2020-10-11 RX ADMIN — FERROUS SULFATE TAB 325 MG (65 MG ELEMENTAL FE) 325 MG: 325 (65 FE) TAB at 21:08

## 2020-10-11 RX ADMIN — HYDROMORPHONE HYDROCHLORIDE 0.5 MG: 1 INJECTION, SOLUTION INTRAMUSCULAR; INTRAVENOUS; SUBCUTANEOUS at 13:16

## 2020-10-11 RX ADMIN — FLUTICASONE PROPIONATE 2 SPRAY: 50 SPRAY, METERED NASAL at 09:31

## 2020-10-11 RX ADMIN — FENOFIBRATE 54 MG: 54 TABLET ORAL at 08:12

## 2020-10-11 RX ADMIN — SODIUM CHLORIDE, PRESERVATIVE FREE 10 ML: 5 INJECTION INTRAVENOUS at 04:42

## 2020-10-11 RX ADMIN — MIDAZOLAM 2 MG: 1 INJECTION INTRAMUSCULAR; INTRAVENOUS at 08:58

## 2020-10-11 RX ADMIN — HYDROMORPHONE HYDROCHLORIDE 0.5 MG: 1 INJECTION, SOLUTION INTRAMUSCULAR; INTRAVENOUS; SUBCUTANEOUS at 06:42

## 2020-10-11 RX ADMIN — Medication 2 G: at 08:50

## 2020-10-11 RX ADMIN — PANTOPRAZOLE SODIUM 40 MG: 40 TABLET, DELAYED RELEASE ORAL at 08:11

## 2020-10-11 RX ADMIN — SODIUM CHLORIDE 20 ML: 9 INJECTION, SOLUTION INTRAVENOUS at 08:10

## 2020-10-11 RX ADMIN — Medication 10 ML: at 21:09

## 2020-10-11 RX ADMIN — APIXABAN 5 MG: 5 TABLET, FILM COATED ORAL at 21:08

## 2020-10-11 RX ADMIN — DULOXETINE HYDROCHLORIDE 30 MG: 30 CAPSULE, DELAYED RELEASE ORAL at 08:13

## 2020-10-11 RX ADMIN — POTASSIUM CHLORIDE 20 MEQ: 1500 TABLET, EXTENDED RELEASE ORAL at 08:14

## 2020-10-11 RX ADMIN — HYDROMORPHONE HYDROCHLORIDE 0.5 MG: 1 INJECTION, SOLUTION INTRAMUSCULAR; INTRAVENOUS; SUBCUTANEOUS at 17:05

## 2020-10-11 RX ADMIN — FENTANYL CITRATE 100 MCG: 50 INJECTION, SOLUTION INTRAMUSCULAR; INTRAVENOUS at 08:58

## 2020-10-11 RX ADMIN — POTASSIUM CHLORIDE 20 MEQ: 1500 TABLET, EXTENDED RELEASE ORAL at 17:05

## 2020-10-11 RX ADMIN — HYDROMORPHONE HYDROCHLORIDE 0.5 MG: 1 INJECTION, SOLUTION INTRAMUSCULAR; INTRAVENOUS; SUBCUTANEOUS at 19:56

## 2020-10-11 RX ADMIN — SODIUM CHLORIDE 20 ML: 9 INJECTION, SOLUTION INTRAVENOUS at 05:52

## 2020-10-11 RX ADMIN — METOPROLOL TARTRATE 25 MG: 25 TABLET, FILM COATED ORAL at 08:11

## 2020-10-11 RX ADMIN — HYDROMORPHONE HYDROCHLORIDE 0.5 MG: 1 INJECTION, SOLUTION INTRAMUSCULAR; INTRAVENOUS; SUBCUTANEOUS at 09:33

## 2020-10-11 RX ADMIN — CHOLESTYRAMINE 4 G: 4 POWDER, FOR SUSPENSION ORAL at 08:13

## 2020-10-11 RX ADMIN — HYDROMORPHONE HYDROCHLORIDE 0.5 MG: 1 INJECTION, SOLUTION INTRAMUSCULAR; INTRAVENOUS; SUBCUTANEOUS at 02:28

## 2020-10-11 RX ADMIN — SODIUM CHLORIDE, PRESERVATIVE FREE 10 ML: 5 INJECTION INTRAVENOUS at 02:29

## 2020-10-11 RX ADMIN — HYDROMORPHONE HYDROCHLORIDE 0.5 MG: 1 INJECTION, SOLUTION INTRAMUSCULAR; INTRAVENOUS; SUBCUTANEOUS at 04:42

## 2020-10-11 RX ADMIN — Medication 10 ML: at 08:10

## 2020-10-11 RX ADMIN — CYCLOBENZAPRINE 10 MG: 10 TABLET, FILM COATED ORAL at 13:14

## 2020-10-11 RX ADMIN — HYDROMORPHONE HYDROCHLORIDE 0.5 MG: 1 INJECTION, SOLUTION INTRAMUSCULAR; INTRAVENOUS; SUBCUTANEOUS at 22:50

## 2020-10-11 ASSESSMENT — PAIN SCALES - GENERAL
PAINLEVEL_OUTOF10: 9
PAINLEVEL_OUTOF10: 10
PAINLEVEL_OUTOF10: 9
PAINLEVEL_OUTOF10: 9
PAINLEVEL_OUTOF10: 0
PAINLEVEL_OUTOF10: 8
PAINLEVEL_OUTOF10: 8
PAINLEVEL_OUTOF10: 10
PAINLEVEL_OUTOF10: 0
PAINLEVEL_OUTOF10: 0
PAINLEVEL_OUTOF10: 10
PAINLEVEL_OUTOF10: 8
PAINLEVEL_OUTOF10: 9
PAINLEVEL_OUTOF10: 10

## 2020-10-11 ASSESSMENT — PAIN DESCRIPTION - PROGRESSION: CLINICAL_PROGRESSION: GRADUALLY WORSENING

## 2020-10-11 ASSESSMENT — PULMONARY FUNCTION TESTS
PIF_VALUE: 0

## 2020-10-11 ASSESSMENT — PAIN DESCRIPTION - PAIN TYPE
TYPE: ACUTE PAIN
TYPE: ACUTE PAIN

## 2020-10-11 ASSESSMENT — PAIN DESCRIPTION - LOCATION
LOCATION: LEG
LOCATION: LEG

## 2020-10-11 ASSESSMENT — PAIN DESCRIPTION - ORIENTATION: ORIENTATION: LEFT

## 2020-10-11 ASSESSMENT — PAIN - FUNCTIONAL ASSESSMENT: PAIN_FUNCTIONAL_ASSESSMENT: PREVENTS OR INTERFERES SOME ACTIVE ACTIVITIES AND ADLS

## 2020-10-11 ASSESSMENT — PAIN DESCRIPTION - DESCRIPTORS: DESCRIPTORS: ACHING;CRAMPING;DISCOMFORT

## 2020-10-11 ASSESSMENT — PAIN DESCRIPTION - ONSET: ONSET: ON-GOING

## 2020-10-11 ASSESSMENT — PAIN DESCRIPTION - FREQUENCY: FREQUENCY: CONTINUOUS

## 2020-10-11 NOTE — PROGRESS NOTES
Hospitalist Progress Note      Synopsis: Patient admitted on 10/7/2020     Subjective    Patient with a complex history with diabetes and hypertension hyperlipidemia bilateral PE and IVC filter and hypertension and GI bleed. Patient has had sepsis due to intra-abdominal infection and underwent a exploratory laparotomy with cholecystectomy and a right-sided hemicolectomy and small bowel resection and side-to-side ileocolonic anastomosis as well. He was discharged to the nursing home on Lovenox and presented back to the hospital with left leg swelling he was found to have a DVT and admitted for further work-up  He is also had a history of previous pulmonary thrombosis. His CTA showed a small filling defect in the right lower lobe consistent with a chronic PE  Patient was admitted to the cardiovascular intensive care unit and underwent EKOS procedure. Subsequent to that he also was positive for COVID-19 though he has no symptoms. This a.m. he was back in the cardiovascular lab for a venogram of the IVC and left lower extremity with existing lysis catheter and removal of lysis catheter  Patient is now back in the ICU  Exam:  /85   Pulse 110   Temp 97.8 °F (36.6 °C) (Axillary)   Resp 13   Ht 6' 1\" (1.854 m)   Wt 264 lb 15.9 oz (120.2 kg)   SpO2 95%   BMI 34.96 kg/m²   General appearance: No apparent distress, appears stated age and cooperative. HEENT: Pupils equal, round, and reactive to light. Conjunctivae/corneas clear. Neck: Supple. No jugular venous distention. Trachea midline. Respiratory:  Normal respiratory effort. Clear to auscultation, bilaterally without Rales/Wheezes/Rhonchi. Cardiovascular: Regular rate and rhythm with normal S1/S2 without murmurs, rubs or gallops. Abdomen: Soft, non-tender, non-distended with normal bowel sounds.   Midline incision is intact and lower abdominal incision is slightly D highest with minimal drainage  Musculoskeletal: Able to move all but left lower extremity is 2+ edema bilateral feet pulses are hard to find  Skin:  No rashes    Neurologic: awake, alert and following commands though appears to be weak    Medications:  Reviewed    Infusion Medications    dextrose      alteplase (ACTIVASE) 10 mg in 0.9% sodium chloride infusion 100 mL 1 mg/hr (10/11/20 0442)    sodium chloride 35 mL/hr at 10/09/20 0800     Scheduled Medications    potassium chloride  20 mEq Oral BID WC    apixaban  5 mg Oral BID    propofol        magnesium sulfate  2 g Intravenous Once    sodium chloride flush  10 mL Intravenous 2 times per day    ceFAZolin (ANCEF) IVPB  2 g Intravenous Q8H    cholestyramine  1 packet Oral BID    cyclobenzaprine  10 mg Oral TID    DULoxetine  30 mg Oral Daily    fenofibrate  54 mg Oral Daily    ferrous sulfate  325 mg Oral BID    fluticasone  2 spray Nasal Daily    hydroxyurea  500 mg Oral BID    insulin glargine  25 Units Subcutaneous Daily    metoprolol tartrate  25 mg Oral BID    pantoprazole  40 mg Oral Daily    pravastatin  20 mg Oral Nightly    insulin lispro  0-6 Units Subcutaneous TID WC    insulin lispro  0-3 Units Subcutaneous Nightly     PRN Meds: glucose, dextrose, glucagon (rDNA), dextrose, HYDROmorphone, sodium chloride flush, acetaminophen, ondansetron, heparin (porcine), heparin (porcine), loperamide, melatonin, [DISCONTINUED] acetaminophen **OR** acetaminophen, polyethylene glycol, promethazine **OR** [DISCONTINUED] ondansetron    I/O    Intake/Output Summary (Last 24 hours) at 10/11/2020 1517  Last data filed at 10/11/2020 1400  Gross per 24 hour   Intake 1875 ml   Output 1225 ml   Net 650 ml       Labs:   Recent Labs     10/10/20  1815 10/11/20  0000 10/11/20  1130   WBC 14.4* 12.3* 11.4   HGB 7.6* 6.3* 8.9*   HCT 24.2* 20.1* 27.5*    412 368       Recent Labs     10/09/20  0249 10/10/20  0405 10/10/20  0519 10/11/20  0545    136  --  139   K 3.5 3.5  --  3.4*    99  --  103   CO2 22 21*  --  23   BUN 8 8  --  5*   CREATININE 0.7 0.7  --  0.6*   CALCIUM 8.3* 8.8  --  8.1*   PHOS  --   --  2.9  --        No results for input(s): PROT, ALB, ALKPHOS, ALT, AST, BILITOT, AMYLASE, LIPASE in the last 72 hours. Recent Labs     10/10/20  0405 10/11/20  0545   INR 1.4 1.4       No results for input(s): Marie Hodge in the last 72 hours. Chronic labs:  Lab Results   Component Value Date    CHOL 94 2020    TRIG 151 (H) 2020    HDL 33 2020    LDLCALC 31 2020    TSH 0.833 2020    INR 1.4 10/11/2020    LABA1C 5.5 2020       Microbiology:  Pending  No results for input(s): BC in the last 72 hours. No results for input(s): ORG in the last 72 hours. No results for input(s): Maurizio Dunk in the last 72 hours. No results for input(s): STREPNEUMAGU in the last 72 hours. No results for input(s): LP1UAG in the last 72 hours. No results for input(s): ASO in the last 72 hours. No results for input(s): CULTRESP in the last 72 hours. No results for input(s): PROCAL in the last 72 hours. Radiology:  Ct Abdomen Pelvis W Iv Contrast Additional Contrast? None    Result Date: 2020  Patient MRN:  [id number] : 1975 Age: 53 years Gender: Male Order Date:  2020 4:51 PM EXAM: CT ABDOMEN PELVIS W IV CONTRAST NUMBER OF IMAGES \ views:  332 INDICATION: Dizziness, abdominal pain COMPARISON: Contemporaneous CTA chest study was performed, CT abdomen pelvis with contrast 2558 TECHNIQUE: Helical imaging was extended from the lower chest to the lower pelvis in conjunction with the intravenous administration of 90 mL of Isovue-370, and axial images were supplemented with sagittal and coronal MPR images. Low-dose CT  acquisition technique included one of following options; 1 . Automated exposure control, 2. Adjustment of MA and or KV according to patient's size or 3. Use of iterative reconstruction.  FINDINGS: CHEST: Contemporaneous CTA imaging documented bilateral pulmonary emboli without right heart strain or acute pulmonary or pleural complications. LIVER: The liver is uniform in parenchymal density, and no focal lesions are identified. There is diffuse fatty change. GALLBLADDER AND BILIARY TREE: There is a pigtail drainage catheter in the gallbladder fossa, and it is uncertain if this is a post cholecystectomy drain or if this is a cholecystostomy associated with complete collapse of the gallbladder. There is no biliary ductal ectasia. SPLEEN: A circumscribed hypodense structure in the splenic bed could be a hypodense or infarcted spleen, but shows no evidence of extracapsular fluid or inflammatory change. The finding measures about 10 cm length by about 7 cm in thickness. It is uncertain if this represents residual following splenectomy, but there is no mention in the electronic record of splenectomy. . ADRENALS:  The adrenals are normal in appearance bilaterally. PANCREAS:The pancreas shows no evidence of acute inflammation or mass lesions. KIDNEYS/BLADDER: The kidneys show uniform cortical enhancement and no evidence of outflow obstruction. There is no perinephric inflammatory change. Ureters are similar in course and caliber, and the bladder is normal in appearance. APPENDIX:  Normal BOWEL:  There is no evidence of inflammation, obstruction, or perforation of enteric structures. There is some fluid filled distal ileum, and the previously identified thickening of jejunal loops in the left upper abdomen is again documented. The appearance suggests enteritis with focal stenosis in the anterior right paramedian mid abdomen suggesting a focal stricture. There is no evidence of fistula formation. Crohn's disease would be a differential diagnostic consideration. PELVIS:  There is no pelvic adenopathy or dependent free pelvic fluid. Venessa   LYMPHATICS: Numerous small lymph nodes are clustered about the small bowel mesenteric root, and there are a few borderline prominent lymph nodes beneath the renal vascular pedicles. VASCULAR: There is no evidence of acute vascular pathology involving mesenteric or retroperitoneal great vessels. There is an inferior vena cava filter. SKELETAL: Mild levoscoliotic curvature and degenerative changes of the lumbar spine are noted. There are bilateral hip arthroplasty elements without acute complications. There is persistent small bowel mural thickening in the midabdomen involving the jejunum and proximal to mid ileum, with an apparent stenosis or focal stricture at about the level of the renal vascular pedicles and inferior vena cava filter in the right paramedian anterior abdomen. There is no evidence of perforation or obstruction, however. There is a pigtail drainage catheter in the gallbladder fossa, which could be a cholecystostomy catheter or a drainage catheter in the gallbladder fossa following cholecystectomy. The gallbladder is present, it is completely collapsed. A cystic appearing structure is noted in the splenic bed, and could be a splenic remnant or a low-density spleen, not prominently enlarged. Xr Chest Portable    Result Date: 10/10/2020  EXAMINATION: ONE XRAY VIEW OF THE CHEST 10/10/2020 11:52 am COMPARISON: 2020 HISTORY: ORDERING SYSTEM PROVIDED HISTORY: pna TECHNOLOGIST PROVIDED HISTORY: Reason for exam:->pna What reading provider will be dictating this exam?->[facility] FINDINGS: The lungs are without acute focal process. There is no effusion or pneumothorax. The cardiomediastinal silhouette is without acute process. The osseous structures are without acute process. No acute process. Xr Chest Portable    Result Date: 2020  Patient MRN:  [id number] : 1975 Age: 53 years Gender: Male Order Date:  2020 11:58 PM TECHNIQUE/NUMBER OF IMAGES/COMPARISON/CLINICAL HISTORY: Chest AP 1 image one view Comparison 10/17/2020. Central venous line placed. FINDINGS: Right internal jugular central venous catheter tip in SVC.  There is no pneumothorax on the right breast. Lungs are clear. Heart and small size, mediastinum unremarkable. There is no perihilar vascular congestion. No acute cardiopulmonary process. Xr Chest Portable    Result Date: 9/17/2020  Single frontal projection (one view) of the chest. COMPARISON: August 6, 2020 FINDINGS: The heart, lungs, mediastinum and regional skeleton are unremarkable. The costophrenic angles are sharp and clear. There is no evidence of mediastinal shift. The heart is not enlarged. There is no evidence of hilar adenopathy. Lungs are negative for evidence of acute airspace consolidation, effusion, atelectasis, pneumothorax, pathologic nodularity or interstitial thickening. Regional bone density and trabecular pattern are normal.     Negative one view chest.    Cta Pulmonary W Contrast    Result Date: 10/7/2020  EXAMINATION: CTA OF THE CHEST 10/7/2020 2:48 pm TECHNIQUE: CTA of the chest was performed after the administration of intravenous contrast.  Multiplanar reformatted images are provided for review. MIP images are provided for review. Dose modulation, iterative reconstruction, and/or weight based adjustment of the mA/kV was utilized to reduce the radiation dose to as low as reasonably achievable. COMPARISON: September 17, 2020 HISTORY: ORDERING SYSTEM PROVIDED HISTORY: DVT, tachy, r/o PE TECHNOLOGIST PROVIDED HISTORY: Reason for exam:->DVT, tachy, r/o PE What reading provider will be dictating this exam?->[facility] FINDINGS: There is a filling defect present within posterior right lower lobe segmental pulmonary artery. Subtle filling defect is present within lingular segmental pulmonary artery as seen on axial image number 125. No evidence of saddle embolism. The heart is normal in size. No pericardial effusion. There is no mediastinal or hilar lymphadenopathy. There are minimal areas of subsegmental atelectasis in lung bases. There is minimal new left pleural effusion. No evidence of pneumonia. There is no pneumothorax. View of the upper abdomen shows normal bilateral adrenal glands. Postsurgical changes with areas of subcutaneous emphysema are associated with subxiphoid anterior abdominal wall. Postsurgical changes are also present within upper abdomen below margin of the liver. 1.  Small filling defect is located within right lower lobe segmental pulmonary artery and small filling defect is present within lingular segmental pulmonary artery. These findings are related to chronic bilateral pulmonary emboli. [place] of pulmonary embolus has decreased compared to prior from 2020. 2.  New minimal left pleural effusion. 3. Foci of gas are associated with the subxiphoid abdominal wall as well as areas of inflammation located in right upper abdomen below margin of liver. Clinical correlation recommended for recent surgery. Critical results were called by Dr. Jessica Ascencio to JOHN Cardona on 10/7/2020 at 15:06. Cta Pulmonary W Contrast    Result Date: 2020  Patient MRN:  [id number] : 1975 Age: 53 years Gender: Male Order Date:  2020 4:51 PM EXAM: CTA PULMONARY W CONTRAST NUMBER OF IMAGES:  480 INDICATION: Dizziness, cholecystostomy drain COMPARISON: None Technique: Low-dose CT  acquisition technique included one of following options; 1 . Automated exposure control, 2. Adjustment of MA and or KV according to patient's size or 3. Use of iterative reconstruction. Contiguous spiral images were obtained in the axial plane, following the administration of 90 mL of Isovue-370 intravenous contrast using CT angiographic protocol. Sagittal and coronal images were reconstructed from the axial plane acquisition. Addition MIP reconstructions were presented to aid in the interpretation of this study. Findings:  The central pulmonary arterial tree shows acute emboli in the descending pulmonary arteries bilaterally, but no areas of regional oligemia, pleural effusion, or peripheral infarction are identified. There is no evidence of right heart strain. Lung window images show no evidence of organized pneumonia or mass lesions. Soft tissue window images show no evidence of mediastinal adenopathy, and the thoracic aorta shows no acute complications. Bone window images show no acute chest wall traumatic findings. Upper abdominal images show no evidence of acute visceral pathology. Diffuse fatty change of the liver is noted. The patient appears to be post cholecystectomy with a pigtail drain in the gallbladder fossa. The adrenals, kidneys, and included bowel structures are unremarkable for some mural thickening of small bowel in the left upper abdomen that could indicate mild colitis. Diverticuli are noted in the left hemicolon. Bilateral central and dependent pulmonary emboli without peripheral infarcts No evidence of pneumonia, pulmonary mass lesions, or cardiac decompensation. Fatty change of the liver, and there is a pigtail drainage catheter in the gallbladder fossa. Upper abdominal bowel loops show some mural thickening, which is nonspecific, but could indicate enteritis. ALERT:  THIS IS AN ABNORMAL REPORT-bilateral lower lobe central acute pulmonary emboli without complicating right heart strain, pleural effusion, or pulmonary infarcts.  Note: The emergency department was contacted telephonically by myself at the time of this dictation communicate findings    Us Dup Lower Extremity Left Jorge    Result Date: 10/7/2020  Patient MRN:  [id number] : 1975 Age: 53 years Gender: Male Order Date:  10/7/2020 2:16 PM EXAM: US DUP LOWER EXTREMITY LEFT JORGE NUMBER OF IMAGES:  25 INDICATION:  leg pain, swelling, r/o DVT leg pain, swelling, r/o DVT What reading provider will be dictating this exam?->[facility] COMPARISON: None There is evidence for deep venous thrombosis, which is occlusive in the left iliac common femoral and profunda superficial femoral and tibial peroneal trunk and the anterior and posterior tibial veins There is otherwise good compressibility, there is good augmentation, there is good color flow. There is evidence for a large deep venous thrombosis involving the entire left leg from the left iliac to the level of the trifurcation trifurcation veins below the knee ALERT:  THIS IS AN ABNORMAL REPORT     Xr Chest Abdomen Ng Placement    Result Date: 2020  Patient MRN: [id number] : 1975 Age:  53 years Gender: Male Order Date: 2020 5:35 AM Exam: XR CHEST ABDOMEN NG PLACEMENT Number of Images: 1 view Indication:   NG placement NG placement Comparison: Prior study from 2020 is available Findings: Study demonstrate lower chest upper abdomen demonstrated the nasogastric tube to be in satisfactory position with the tip in the body of the stomach. There is an IVC filter noted to be in place. There is a right internal jugular catheter with tip in the superior vena cava. The abdomen gas pattern is nonspecific. Nasogastric tube in satisfactory position Other findings as described above     Fluoro For Surgical Procedures    Result Date: 10/11/2020  EXAMINATION: SPOT FLUOROSCOPIC IMAGES 10/11/2020 1:16 pm TECHNIQUE: Fluoroscopy was provided by the radiology department for procedure. Radiologist was not present during examination. FLUOROSCOPY DOSE AND TYPE OR TIME AND EXPOSURES: 107.9 seconds. 73.0 seconds cumulative dose. COMPARISON: None HISTORY: ORDERING SYSTEM PROVIDED HISTORY: iliac stent revision TECHNOLOGIST PROVIDED HISTORY: Reason for exam:->iliac stent revision What reading provider will be dictating this exam?->[facility] Intraprocedural imaging. Initial exam FINDINGS: 3 spot images of the pelvis were obtained. Spot views demonstrate images from a right iliac stent revision. Intraprocedural fluoroscopic spot images as above. See separate procedure report for more information.    ASSESSMENT:    Principal Problem:    Acute thrombosis of inferior vena cava (HCC)  Active

## 2020-10-11 NOTE — PROGRESS NOTES
[address]   Department of Internal Medicine   Internal Medicine Residency   MICU Progress Note    Patient:  Josemanuel Allison 53 y.o. male  MRN: [id number]     Date of Service: 10/11/2020    Allergy: Seasonal and Tramadol  Follow covid    Subjective     Patient was seen and examined through his window this morning. Hgb was 6.3 today, transfuse 1 unit PRBC. No other acute events overnight. For angiography again today per Vascular Surgery. Objective     VS: /70   Pulse 108   Temp 98.5 °F (36.9 °C)   Resp (!) 38   Ht 6' 1\" (1.854 m)   Wt 264 lb 15.9 oz (120.2 kg)   SpO2 92%   BMI 34.96 kg/m²           I & O - 24hr:     Intake/Output Summary (Last 24 hours) at 10/11/2020 1054  Last data filed at 10/11/2020 0600  Gross per 24 hour   Intake 2195 ml   Output 1255 ml   Net 940 ml       Physical Exam:  · According to hospital policy, in order to conserve PPE and reduce exposure of healthcare workers physical examination was done today. Pertinent exam findings were discussed with the nurse and MICU attending.     Lines     site day    Art line   None    TLC None    PICC None    Hemoaccess None    Oxygen:     Room air     Medications     Infusions: (Fluid, Sedation, Vasopressors)  IVF:    NaCl 0.9% 35 ml/h  Vasopressors   None  Sedation   None    Nutrition:   General diet    ATB:   Antibiotics  Days   Cefazolin 4             Labs     CBC with Differential:    Lab Results   Component Value Date    WBC 12.3 10/11/2020    RBC 2.33 10/11/2020    HGB 6.3 10/11/2020    HCT 20.1 10/11/2020     10/11/2020    MCV 86.3 10/11/2020    MCH 27.0 10/11/2020    MCHC 31.3 10/11/2020    RDW 17.7 10/11/2020    NRBC 0.9 08/01/2020    SEGSPCT 80 11/26/2013    LYMPHOPCT 17.2 10/07/2020    MONOPCT 9.5 10/07/2020    BASOPCT 0.2 10/07/2020    MONOSABS 1.01 10/07/2020    LYMPHSABS 1.82 10/07/2020    EOSABS 0.16 10/07/2020    BASOSABS 0.02 10/07/2020     CMP:    Lab Results   Component Value Date     10/11/2020    K 3.4 10/11/2020    K 3.5 10/08/2020     10/11/2020    CO2 23 10/11/2020    BUN 5 10/11/2020    CREATININE 0.6 10/11/2020    GFRAA >60 10/11/2020    LABGLOM >60 10/11/2020    GLUCOSE 93 10/11/2020    GLUCOSE 125 05/11/2012    PROT 6.0 10/07/2020    LABALBU 2.6 10/07/2020    LABALBU 4.8 05/11/2012    CALCIUM 8.1 10/11/2020    BILITOT <0.2 10/07/2020    ALKPHOS 171 10/07/2020    AST 12 10/07/2020    ALT 13 10/07/2020         Resident's Assessment and Plan     1. COVID 19+  - COVID-19 positive on 10/9  - Patient is not in acute respiratory distress and is currently asymptomatic  - Afebrile since admission  - Currently on room air, saturating well 97-98%  - Continue to monitor respiratory status  - Continue isolation      2. Left lower extremity DVT s/p EKOS on 10/8/2020  - Has history of bilateral PE on 6/2020 s/p IVC filter on 9/5/2020, for Lovenox subcutaneously twice daily at nursing home  - CTA chest on 10/7 showed small filling defect in right lower lung lobe consistent with chronic PE  - DVT ultrasound on 10/7 showed large left DVT  - Vascular surgery following, appreciate recommendations  - Hgb 6.3 this morning, transfused 1 unit pRBC  - Continue tPA/heparin via EKOS  - Continue Ancef 2 g every 8 hours per vascular surgery  - For repeat angiography today per vascular surgery  - Monitor H&H, aPTT, fibrinogen every 6 hours  - Follow FOBT due to acute drop in Hgb; if positive, will consult General Surgery  - Monitor for signs of bleeding     3. Sinus tachycardia  - HR 100s  - EKG on 10/7 showed sinus tachycardia  - Continue Lopressor  - Continue to monitor heart rate     4. Iron deficiency anemia  - Hgb 6.3 today, transfused 1 unit pRBC  - Ferritin 233, iron 24, iron saturation 17, TIBC 145  - Continue ferrous sulfate 325 mg twice daily     5. 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  History of hypertension  - BP stable  - Continue Lopressor 25 mg twice daily  - Continue to monitor BP     7. History of type 2 diabetes mellitus, insulin-dependent  - Continue Lantus 25 units daily with LDSS  - Continue to monitor blood sugar     8. History of hyperlipidemia  - Continue home cholestyramine, fenofibrate, pravastatin     Code Status: Full code  DVT ppx: tPA/heparin  GI ppx: Robert Pozo MD, PGY-1  Attending physician: Dr. Bean Cota personally saw, examined and provided care for the patient. Radiographs, labs and medication list were reviewed by me independently. I spoke with bedside nursing, therapists and consultants. Critical care services and times documented are independent of procedures and multidisciplinary rounds with Residents. Additionally comprehensive, multidisciplinary rounds were conducted with the MICU team. The case was discussed in detail and plans for care were established. Review of Residents documentation was conducted and revisions were made as appropriate. I agree with the above documented exam, problem list and plan of care.     Joshua Bentley MD,[facility]  Pulmonary&Critical Care Medicine   Director of [facility]  Medical Director of [address] [place] [address]    Nusrat Arreaga

## 2020-10-11 NOTE — OP NOTE
Cardiovascular Lab Procedure Report    Karishma Estrada  1975    Date : 10/11/2020  Surgeon: Judith Harmon M.D. Pre-procedure Diagnosis: Left lower extremity DVT, ivc thrombosis  Post-procedure Diagnosis: Same,   Procedure:    82931 FU  Venogram of IVC and Left lower extremity with existing lysis catheter   Removal of lysis catheter   Anesthesia: [facility]  Assistants: Cath Lab Staff  Estimated Blood Loss: Minimal  Complications: none  Findings:    Left    Inferior Vena Cava Filter, some residual thrombus within but much improved  IVC patent, minimal residual thrombus   Common Iliac Vein Patent with stent   External Iliac Vein Patent with stent   Common Femoral Vein Patent   Superficial Femoral Vein Residual thrombus present but much improved   AK pop vein Not visualized     Procedure Details :  Timeout preformed identifying pt and procedure. Left popliteal region prepped and draped in sterile fashion. Patient given sedation as needed throughout the case per anesthesia. Preexisting 6 fr sheath with EKOS lsysis catheter was already in place in the Left popliteal vein. Advantage glide wire placed, and lysis catheter removed. Glide catheter advanced into IVC. Venogram preformed of  Left lower extremity and inferior vena cava.        EKOS catheter     Plan  Start jay jay Harmon

## 2020-10-11 NOTE — ANESTHESIA PRE PROCEDURE
Department of Anesthesiology  Preprocedure Note       Name:  Domingo Gonzalez   Age:  53 y.o.  :  1975                                          MRN:  [id number]         Date:  10/11/2020      Surgeon: Pavan Parr):  Espinoza Suresh MD    Procedure: Procedure(s):  S/P ILIAC STENT PLACEMENT VISUALIZATION    Medications prior to admission:   Prior to Admission medications    Medication Sig Start Date End Date Taking? Authorizing Provider   pregabalin (LYRICA) 150 MG capsule Take 300 mg by mouth 2 times daily.    Yes Historical Provider, MD   cholestyramine (QUESTRAN) 4 g packet Take 1 packet by mouth 2 times daily 20   Yvette Johnson MD   glucose (GLUTOSE) 40 % GEL Take 37.5 mLs by mouth as needed (low sugar) 20   Yvette Johnson MD   insulin glargine (LANTUS) 100 UNIT/ML injection vial Inject 25 Units into the skin Daily 20   Yvette Johnson MD   enoxaparin (LOVENOX) 40 MG/0.4ML injection Inject 0.4 mLs into the skin daily 20   Yvette Johnson MD   metoprolol tartrate (LOPRESSOR) 25 MG tablet Take 1 tablet by mouth 2 times daily 20   Maryam Avila MD   melatonin 3 MG TABS tablet Take 3 mg by mouth nightly as needed (sleep)    Historical Provider, MD   fluticasone (FLONASE) 50 MCG/ACT nasal spray 2 sprays by Nasal route daily    Historical Provider, MD   cyclobenzaprine (FLEXERIL) 10 MG tablet Take 10 mg by mouth three times daily    Historical Provider, MD   fenofibrate (TRICOR) 54 MG tablet Take 54 mg by mouth daily    Historical Provider, MD   ferrous sulfate (IRON 325) 325 (65 Fe) MG tablet Take 325 mg by mouth 2 times daily    Historical Provider, MD   hydroxyurea (HYDREA) 500 MG chemo capsule Take 500 mg by mouth 2 times daily    Historical Provider, MD   loperamide (IMODIUM) 2 MG capsule Take 2 mg by mouth 4 times daily as needed for Diarrhea    Historical Provider, MD   insulin lispro (HUMALOG) 100 UNIT/ML injection vial Inject 3 Units into the skin 3 times daily (before meals) Injects 3 units three times daily before meals in addition to following sliding scale  : 0 units  141-180: 1 unit  181-220: 2 units  221-260: 3 units  261-300: 4 units  301-340: 5 units  >341: 6 units and call MD    Historical Provider, MD   pantoprazole (PROTONIX) 40 MG tablet Take 40 mg by mouth daily    Historical Provider, MD   DULoxetine (CYMBALTA) 30 MG extended release capsule Take 1 capsule by mouth daily 5/14/20   IGOR Flores CNP   pravastatin (PRAVACHOL) 20 MG tablet Take 1 tablet by mouth nightly 5/14/20   IGOR Flores CNP       Current medications:    Current Facility-Administered Medications   Medication Dose Route Frequency Provider Last Rate Last Dose    0.9 % sodium chloride bolus  20 mL Intravenous Once Sophie Kennedy MD 10 mL/hr at 10/11/20 0810 20 mL at 10/11/20 0810    potassium chloride (KLOR-CON M) extended release tablet 20 mEq  20 mEq Oral BID  Sophie Kennedy MD   20 mEq at 10/11/20 0814    glucose (GLUTOSE) 40 % oral gel 15 g  15 g Oral PRN Melvin Daily, MD        dextrose 50 % IV solution  12.5 g Intravenous PRN Melvin Daily, MD        glucagon (rDNA) injection 1 mg  1 mg Intramuscular PRN Melvin Daily, MD        dextrose 5 % solution  100 mL/hr Intravenous PRN Melvin Daily, MD        magnesium sulfate 2 g in 50 mL IVPB premix  2 g Intravenous Once Melvin Daily, MD        alteplase (CATHFLO) 10 mg in sodium chloride 0.9 % 100 mL infusion  1 mg/hr Intracatheter Continuous Laverne Chawla MD 10 mL/hr at 10/11/20 0442 1 mg/hr at 10/11/20 0442    heparin 25,000 units in dextrose 5% 250 mL infusion  500 Units/hr Intracatheter Continuous Laverne Chawla MD 5 mL/hr at 10/09/20 2229 500 Units/hr at 10/09/20 2229    0.9 % sodium chloride infusion   Intracatheter Continuous Laverne Chawla MD 35 mL/hr at 10/09/20 0800      HYDROmorphone (DILAUDID) injection 0.5 mg  0.5 mg Intravenous Q2H PRN Laverne Chawla MD   0.5 mg at 10/11/20 6466    sodium chloride flush 0.9 % injection 10 mL  10 mL Intravenous 2 times per day Lelia Love MD   10 mL at 10/11/20 0810    sodium chloride flush 0.9 % injection 10 mL  10 mL Intravenous PRN Lelia Love MD   10 mL at 10/11/20 0442    acetaminophen (TYLENOL) tablet 650 mg  650 mg Oral Q4H PRN Lelia Love MD        ondansetron [facility]) injection 4 mg  4 mg Intravenous Q6H PRN Lelia Love MD        ceFAZolin (ANCEF) 2 g in sterile water 20 mL IV syringe  2 g Intravenous Q8H Lelia Love MD   2 g at 10/11/20 0850    heparin (porcine) injection 8,890 Units  80 Units/kg Intravenous PRN Lelia Love MD        heparin (porcine) injection 4,440 Units  40 Units/kg Intravenous PRN Lelia Love MD   4,400 Units at 10/08/20 0602    heparin 25,000 units in dextrose 5% 250 mL infusion  18 Units/kg/hr Intravenous Continuous Lelia Love MD   Stopped at 10/08/20 1527    cholestyramine (QUESTRAN) packet 4 g  1 packet Oral BID Lelia Love MD   4 g at 10/11/20 0813    cyclobenzaprine (FLEXERIL) tablet 10 mg  10 mg Oral TID Lelia Love MD   10 mg at 10/11/20 9005    DULoxetine (CYMBALTA) extended release capsule 30 mg  30 mg Oral Daily Lelia Love MD   30 mg at 10/11/20 0813    fenofibrate (TRICOR) tablet 54 mg  54 mg Oral Daily Lelia Love MD   54 mg at 10/11/20 9759    ferrous sulfate (IRON 325) tablet 325 mg  325 mg Oral BID Lelia Love MD   325 mg at 10/11/20 0811    fluticasone (FLONASE) 50 MCG/ACT nasal spray 2 spray  2 spray Nasal Daily Lelia Love MD   2 spray at 10/10/20 1259    hydroxyurea (HYDREA) chemo capsule 500 mg  500 mg Oral BID Lelia Love MD   500 mg at 10/11/20 8075    insulin glargine (LANTUS) injection vial 25 Units  25 Units Subcutaneous Daily Lelia Love MD   25 Units at 10/08/20 5566    loperamide (IMODIUM) capsule 2 mg  2 mg Oral 4x Daily PRN Tami Gottron, MD        melatonin tablet 3 mg  3 mg Oral Nightly PRN Tami Gottron, MD   3 mg at 10/10/20 1952    metoprolol tartrate (LOPRESSOR) tablet 25 mg  25 mg Oral BID Tami Gottron, MD   25 mg at 10/11/20 0811    pantoprazole (PROTONIX) tablet 40 mg  40 mg Oral Daily Tami Gottron, MD   40 mg at 10/11/20 9215    pravastatin (PRAVACHOL) tablet 20 mg  20 mg Oral Nightly Tami Gottron, MD   20 mg at 10/10/20 1955    acetaminophen (TYLENOL) suppository 650 mg  650 mg Rectal Q6H PRN Tami Gottron, MD        polyethylene glycol (GLYCOLAX) packet 17 g  17 g Oral Daily PRN Tami Gottron, MD        promethazine (PHENERGAN) tablet 12.5 mg  12.5 mg Oral Q6H PRN Tami Gottron, MD        insulin lispro (HUMALOG) injection vial 0-6 Units  0-6 Units Subcutaneous TID WC Tami Gottron, MD   Stopped at 10/07/20 1939    insulin lispro (HUMALOG) injection vial 0-3 Units  0-3 Units Subcutaneous Nightly Tami Gottron, MD   1 Units at 10/10/20 2000     Facility-Administered Medications Ordered in Other Encounters   Medication Dose Route Frequency Provider Last Rate Last Dose    midazolam (VERSED) injection    PRN Alexis Abram, APRN - CRNA   2 mg at 10/11/20 0858    fentaNYL (SUBLIMAZE) injection    PRN Alexis Abram, APRN - CRNA   100 mcg at 10/11/20 0858       Allergies:     Allergies   Allergen Reactions    Seasonal      HAYFEVER / sneezing,watery eyes    Tramadol Itching       Problem List:    Patient Active Problem List   Diagnosis Code    Neuropathic pain M79.2    Lumbar spondylosis M47.816    Osteoarthritis M19.90    Insomnia G47.00    Fibromyalgia M79.7    Vitamin D deficiency E55.9    Essential hypertension I10    Allergic rhinitis J30.9    Lumbar radiculopathy M54.16    Osteoarthritis of spine with radiculopathy, lumbar region M47.26    Class 1 obesity in Procedure Laterality Date    COLONOSCOPY N/A 9/5/2020    COLONOSCOPY WITH BIOPSY performed by Pastor Carl MD at [address] PTC NEW ACCESS  8/3/2020    CT PTC NEW ACCESS 8/3/2020 [facility] CT    FOOT SURGERY Right 1985    to treat shattered bones    HERNIA REPAIR  2001    DOUBLE HERNIA    HERNIA REPAIR Right 12/9/2019    LAPAROSCOPIC RIGHT INGUINAL HERNIA REPAIR, MESH 10x15 cm PLACEMENT performed by Judy Gallegos MD at [facility] Left 4/11/2016    LAPAROTOMY N/A 9/18/2020    LAPAROTOMY EXPLORATORY, CHOLECYSTECTOMY, BOWEL RESECTION, right darian colectomy, partial omentectomy, and splenectomy performed by Pastor Carl MD at [address] FLX DX W/COLLJ [address] PFRMD N/A 5/7/2018    COLONOSCOPY DIAGNOSTIC performed by Keli Catalan MD at [address] [facility] Left 2014    Dr. [facility] ARTHROSCOPY Left 11 21 14    SHOULDER SURGERY  2001 &2007    RIGHT AND LEFT repair of tears    TOTAL HIP ARTHROPLASTY Right 10/31/2016    Total right hip  Adelaida Sky MD    UPPER GASTROINTESTINAL ENDOSCOPY N/A 9/4/2020    EGD DIAGNOSTIC ONLY performed by Deya Isaac MD at [facility] 1  2007    LEFT WRIST       Social History:    Social History     Tobacco Use    Smoking status: Never Smoker    Smokeless tobacco: Current User     Types: Chew   Substance Use Topics    Alcohol use: Not Currently     Alcohol/week: 0.0 standard drinks     Frequency: Monthly or less                                Ready to quit: Not Answered  Counseling given: Not Answered      Vital Signs (Current):   Vitals:    10/11/20 0400 10/11/20 0500 10/11/20 0545 10/11/20 0600   BP: (!) 91/56 128/78 128/78 108/70   Pulse: 105 113 108 108   Resp: 18 28 23 (!) 38   Temp: 36.9 °C (98.4 °F)  36.9 °C (98.5 °F)    TempSrc: Axillary      SpO2: 90%   92%   Weight:       Height: BP Readings from Last 3 Encounters:   10/11/20 108/70   10/01/20 111/76   09/26/20 100/60       NPO Status:  more then 8 hrs                                                                               BMI:   Wt Readings from Last 3 Encounters:   10/10/20 264 lb 15.9 oz (120.2 kg)   10/01/20 245 lb (111.1 kg)   09/23/20 265 lb 10.5 oz (120.5 kg)     Body mass index is 34.96 kg/m². CBC:   Lab Results   Component Value Date    WBC 12.3 10/11/2020    RBC 2.33 10/11/2020    HGB 6.3 10/11/2020    HCT 20.1 10/11/2020    MCV 86.3 10/11/2020    RDW 17.7 10/11/2020     10/11/2020       CMP:   Lab Results   Component Value Date     10/11/2020    K 3.4 10/11/2020    K 3.5 10/08/2020     10/11/2020    CO2 23 10/11/2020    BUN 5 10/11/2020    CREATININE 0.6 10/11/2020    GFRAA >60 10/11/2020    LABGLOM >60 10/11/2020    GLUCOSE 93 10/11/2020    GLUCOSE 125 05/11/2012    PROT 6.0 10/07/2020    CALCIUM 8.1 10/11/2020    BILITOT <0.2 10/07/2020    ALKPHOS 171 10/07/2020    AST 12 10/07/2020    ALT 13 10/07/2020       POC Tests: No results for input(s): POCGLU, POCNA, POCK, POCCL, POCBUN, POCHEMO, POCHCT in the last 72 hours.     Coags:   Lab Results   Component Value Date    PROTIME 16.2 10/11/2020    INR 1.4 10/11/2020    APTT 36.3 10/11/2020       HCG (If Applicable): No results found for: PREGTESTUR, PREGSERUM, HCG, HCGQUANT     ABGs: No results found for: PHART, PO2ART, FLH4YZS, IGB9WOE, BEART, C6RLUZSM     Type & Screen (If Applicable):  No results found for: LABABO, LABRH    Drug/Infectious Status (If Applicable):  No results found for: HIV, HEPCAB    COVID-19 Screening (If Applicable):   Lab Results   Component Value Date    COVID19 DETECTED 10/09/2020    COVID19 Not Detected 09/21/2020         Anesthesia Evaluation    Airway: Mallampati: II        Dental:          Pulmonary:                              Cardiovascular:    (+) hypertension:,                   Neuro/Psych:   (+) neuromuscular disease:, psychiatric history:            GI/Hepatic/Renal:             Endo/Other:    (+) Diabetes, . Abdominal:           Vascular:                                        Anesthesia Plan      MAC     ASA 3                               Pt assessed and interviewed prior to anesthesia (late entry)  IGOR Swanson - ARTEMIO   10/11/2020        Pt seen, examined, chart reviewed, plan discussed.   Lisandra Warner  10/11/2020  9:34 AM

## 2020-10-11 NOTE — ANESTHESIA POSTPROCEDURE EVALUATION
Department of Anesthesiology  Postprocedure Note    Patient: Mary Bustos  MRN: [id number]  [place]: 1975  Date of evaluation: 10/11/2020  Time:  9:34 AM     Procedure Summary     Date:  10/11/20 Room / Location:  [facility] OR 12 / [facility]    Anesthesia Start:  6052 Anesthesia Stop:  1880    Procedure:  S/P ILIAC STENT PLACEMENT VISUALIZATION (N/A ) Diagnosis:  (thrombosed dvt)    Surgeon:  Laverne Chawla MD Responsible Provider:  Sandra Monzon MD    Anesthesia Type:  MAC ASA Status:  3          Anesthesia Type: No value filed. Cristal Phase I:      Cristal Phase II:      Last vitals: Reviewed and per EMR flowsheets. Anesthesia Post Evaluation    Patient location: OR.   Patient participation: complete - patient participated  Level of consciousness: awake  Pain score: 3  Airway patency: patent  Nausea & Vomiting: no nausea and no vomiting  Complications: no  Cardiovascular status: blood pressure returned to baseline  Respiratory status: acceptable  Hydration status: euvolemic

## 2020-10-12 LAB
ANION GAP SERPL CALCULATED.3IONS-SCNC: 9 MMOL/L (ref 7–16)
BASOPHILS ABSOLUTE: 0.04 E9/L (ref 0–0.2)
BASOPHILS RELATIVE PERCENT: 0.5 % (ref 0–2)
BUN BLDV-MCNC: 3 MG/DL (ref 6–20)
CALCIUM SERPL-MCNC: 5.8 MG/DL (ref 8.6–10.2)
CHLORIDE BLD-SCNC: 112 MMOL/L (ref 98–107)
CO2: 18 MMOL/L (ref 22–29)
CREAT SERPL-MCNC: 0.4 MG/DL (ref 0.7–1.2)
EOSINOPHILS ABSOLUTE: 0.37 E9/L (ref 0.05–0.5)
EOSINOPHILS RELATIVE PERCENT: 4.2 % (ref 0–6)
GFR AFRICAN AMERICAN: >60
GFR NON-AFRICAN AMERICAN: >60 ML/MIN/1.73
GLUCOSE BLD-MCNC: 67 MG/DL (ref 74–99)
HCT VFR BLD CALC: 22.3 % (ref 37–54)
HCT VFR BLD CALC: 30.8 % (ref 37–54)
HEMOGLOBIN: 7.1 G/DL (ref 12.5–16.5)
HEMOGLOBIN: 9.8 G/DL (ref 12.5–16.5)
IMMATURE GRANULOCYTES #: 0.08 E9/L
IMMATURE GRANULOCYTES %: 0.9 % (ref 0–5)
LYMPHOCYTES ABSOLUTE: 1.21 E9/L (ref 1.5–4)
LYMPHOCYTES RELATIVE PERCENT: 13.6 % (ref 20–42)
MCH RBC QN AUTO: 27.8 PG (ref 26–35)
MCH RBC QN AUTO: 28.4 PG (ref 26–35)
MCHC RBC AUTO-ENTMCNC: 31.8 % (ref 32–34.5)
MCHC RBC AUTO-ENTMCNC: 31.8 % (ref 32–34.5)
MCV RBC AUTO: 87.3 FL (ref 80–99.9)
MCV RBC AUTO: 89.2 FL (ref 80–99.9)
METER GLUCOSE: 104 MG/DL (ref 74–99)
METER GLUCOSE: 113 MG/DL (ref 74–99)
METER GLUCOSE: 121 MG/DL (ref 74–99)
METER GLUCOSE: 98 MG/DL (ref 74–99)
MONOCYTES ABSOLUTE: 1.32 E9/L (ref 0.1–0.95)
MONOCYTES RELATIVE PERCENT: 14.9 % (ref 2–12)
NEUTROPHILS ABSOLUTE: 5.85 E9/L (ref 1.8–7.3)
NEUTROPHILS RELATIVE PERCENT: 65.9 % (ref 43–80)
PDW BLD-RTO: 17.4 FL (ref 11.5–15)
PDW BLD-RTO: 17.6 FL (ref 11.5–15)
PLATELET # BLD: 228 E9/L (ref 130–450)
PLATELET # BLD: 560 E9/L (ref 130–450)
PMV BLD AUTO: 8.7 FL (ref 7–12)
PMV BLD AUTO: 9.3 FL (ref 7–12)
POTASSIUM SERPL-SCNC: 3.9 MMOL/L (ref 3.5–5)
RBC # BLD: 2.5 E12/L (ref 3.8–5.8)
RBC # BLD: 3.53 E12/L (ref 3.8–5.8)
SODIUM BLD-SCNC: 139 MMOL/L (ref 132–146)
WBC # BLD: 7.1 E9/L (ref 4.5–11.5)
WBC # BLD: 8.9 E9/L (ref 4.5–11.5)

## 2020-10-12 PROCEDURE — 6370000000 HC RX 637 (ALT 250 FOR IP): Performed by: SURGERY

## 2020-10-12 PROCEDURE — 6360000002 HC RX W HCPCS: Performed by: SURGERY

## 2020-10-12 PROCEDURE — 2580000003 HC RX 258: Performed by: SURGERY

## 2020-10-12 PROCEDURE — 36415 COLL VENOUS BLD VENIPUNCTURE: CPT

## 2020-10-12 PROCEDURE — 99232 SBSQ HOSP IP/OBS MODERATE 35: CPT | Performed by: INTERNAL MEDICINE

## 2020-10-12 PROCEDURE — 6360000002 HC RX W HCPCS: Performed by: INTERNAL MEDICINE

## 2020-10-12 PROCEDURE — 80048 BASIC METABOLIC PNL TOTAL CA: CPT

## 2020-10-12 PROCEDURE — 85025 COMPLETE CBC W/AUTO DIFF WBC: CPT

## 2020-10-12 PROCEDURE — 2580000003 HC RX 258: Performed by: INTERNAL MEDICINE

## 2020-10-12 PROCEDURE — 97162 PT EVAL MOD COMPLEX 30 MIN: CPT

## 2020-10-12 PROCEDURE — 82962 GLUCOSE BLOOD TEST: CPT

## 2020-10-12 PROCEDURE — 97530 THERAPEUTIC ACTIVITIES: CPT

## 2020-10-12 PROCEDURE — 6370000000 HC RX 637 (ALT 250 FOR IP): Performed by: NURSE PRACTITIONER

## 2020-10-12 PROCEDURE — 85027 COMPLETE CBC AUTOMATED: CPT

## 2020-10-12 PROCEDURE — 2140000000 HC CCU INTERMEDIATE R&B

## 2020-10-12 RX ORDER — OXYCODONE HYDROCHLORIDE 10 MG/1
10 TABLET ORAL EVERY 4 HOURS PRN
Status: DISCONTINUED | OUTPATIENT
Start: 2020-10-12 | End: 2020-10-13

## 2020-10-12 RX ORDER — OXYCODONE HYDROCHLORIDE 5 MG/1
5 TABLET ORAL EVERY 4 HOURS PRN
Status: DISCONTINUED | OUTPATIENT
Start: 2020-10-12 | End: 2020-10-13

## 2020-10-12 RX ADMIN — FERROUS SULFATE TAB 325 MG (65 MG ELEMENTAL FE) 325 MG: 325 (65 FE) TAB at 08:55

## 2020-10-12 RX ADMIN — METOPROLOL TARTRATE 25 MG: 25 TABLET, FILM COATED ORAL at 20:13

## 2020-10-12 RX ADMIN — FENOFIBRATE 54 MG: 54 TABLET ORAL at 08:55

## 2020-10-12 RX ADMIN — OXYCODONE AND ACETAMINOPHEN 1 TABLET: 5; 325 TABLET ORAL at 04:03

## 2020-10-12 RX ADMIN — Medication 10 ML: at 09:00

## 2020-10-12 RX ADMIN — HYDROMORPHONE HYDROCHLORIDE 0.5 MG: 1 INJECTION, SOLUTION INTRAMUSCULAR; INTRAVENOUS; SUBCUTANEOUS at 06:01

## 2020-10-12 RX ADMIN — DULOXETINE HYDROCHLORIDE 30 MG: 30 CAPSULE, DELAYED RELEASE ORAL at 08:55

## 2020-10-12 RX ADMIN — PANTOPRAZOLE SODIUM 40 MG: 40 TABLET, DELAYED RELEASE ORAL at 08:54

## 2020-10-12 RX ADMIN — FLUTICASONE PROPIONATE 2 SPRAY: 50 SPRAY, METERED NASAL at 08:58

## 2020-10-12 RX ADMIN — APIXABAN 5 MG: 5 TABLET, FILM COATED ORAL at 08:54

## 2020-10-12 RX ADMIN — HYDROMORPHONE HYDROCHLORIDE 0.5 MG: 1 INJECTION, SOLUTION INTRAMUSCULAR; INTRAVENOUS; SUBCUTANEOUS at 10:53

## 2020-10-12 RX ADMIN — PRAVASTATIN SODIUM 20 MG: 20 TABLET ORAL at 20:13

## 2020-10-12 RX ADMIN — OXYCODONE HYDROCHLORIDE 10 MG: 10 TABLET ORAL at 13:27

## 2020-10-12 RX ADMIN — OXYCODONE HYDROCHLORIDE 10 MG: 10 TABLET ORAL at 17:27

## 2020-10-12 RX ADMIN — Medication 10 ML: at 20:14

## 2020-10-12 RX ADMIN — HYDROXYUREA 500 MG: 500 CAPSULE ORAL at 21:42

## 2020-10-12 RX ADMIN — CALCIUM GLUCONATE 1 G: 98 INJECTION, SOLUTION INTRAVENOUS at 16:25

## 2020-10-12 RX ADMIN — HYDROMORPHONE HYDROCHLORIDE 0.5 MG: 1 INJECTION, SOLUTION INTRAMUSCULAR; INTRAVENOUS; SUBCUTANEOUS at 02:00

## 2020-10-12 RX ADMIN — POTASSIUM CHLORIDE 20 MEQ: 1500 TABLET, EXTENDED RELEASE ORAL at 08:56

## 2020-10-12 RX ADMIN — HYDROXYUREA 500 MG: 500 CAPSULE ORAL at 09:30

## 2020-10-12 RX ADMIN — METOPROLOL TARTRATE 25 MG: 25 TABLET, FILM COATED ORAL at 08:55

## 2020-10-12 RX ADMIN — CYCLOBENZAPRINE 10 MG: 10 TABLET, FILM COATED ORAL at 08:54

## 2020-10-12 RX ADMIN — APIXABAN 5 MG: 5 TABLET, FILM COATED ORAL at 20:13

## 2020-10-12 RX ADMIN — FERROUS SULFATE TAB 325 MG (65 MG ELEMENTAL FE) 325 MG: 325 (65 FE) TAB at 20:13

## 2020-10-12 RX ADMIN — CYCLOBENZAPRINE 10 MG: 10 TABLET, FILM COATED ORAL at 20:13

## 2020-10-12 RX ADMIN — CYCLOBENZAPRINE 10 MG: 10 TABLET, FILM COATED ORAL at 16:25

## 2020-10-12 RX ADMIN — CHOLESTYRAMINE 4 G: 4 POWDER, FOR SUSPENSION ORAL at 08:58

## 2020-10-12 RX ADMIN — OXYCODONE AND ACETAMINOPHEN 1 TABLET: 5; 325 TABLET ORAL at 08:56

## 2020-10-12 RX ADMIN — POTASSIUM CHLORIDE 20 MEQ: 1500 TABLET, EXTENDED RELEASE ORAL at 16:25

## 2020-10-12 RX ADMIN — OXYCODONE HYDROCHLORIDE 10 MG: 10 TABLET ORAL at 21:32

## 2020-10-12 ASSESSMENT — PAIN SCALES - GENERAL
PAINLEVEL_OUTOF10: 0
PAINLEVEL_OUTOF10: 8
PAINLEVEL_OUTOF10: 8
PAINLEVEL_OUTOF10: 9
PAINLEVEL_OUTOF10: 10
PAINLEVEL_OUTOF10: 0
PAINLEVEL_OUTOF10: 9

## 2020-10-12 ASSESSMENT — PAIN DESCRIPTION - ONSET: ONSET: ON-GOING

## 2020-10-12 ASSESSMENT — PAIN DESCRIPTION - DESCRIPTORS: DESCRIPTORS: ACHING;CONSTANT;DISCOMFORT

## 2020-10-12 ASSESSMENT — PAIN DESCRIPTION - LOCATION
LOCATION: LEG;BACK
LOCATION: LEG;BACK

## 2020-10-12 ASSESSMENT — PAIN SCALES - WONG BAKER
WONGBAKER_NUMERICALRESPONSE: 0
WONGBAKER_NUMERICALRESPONSE: 0

## 2020-10-12 ASSESSMENT — PAIN DESCRIPTION - FREQUENCY: FREQUENCY: CONTINUOUS

## 2020-10-12 ASSESSMENT — PAIN DESCRIPTION - PROGRESSION
CLINICAL_PROGRESSION: NOT CHANGED
CLINICAL_PROGRESSION: NOT CHANGED

## 2020-10-12 ASSESSMENT — PAIN DESCRIPTION - PAIN TYPE
TYPE: ACUTE PAIN
TYPE: ACUTE PAIN

## 2020-10-12 NOTE — CARE COORDINATION
SOCIAL WORK/DISCHARGE PLANNING;  Spoke with Edwar Lara. He reported that pt will be able to return to them upon discharge. They have an isolation room available for pt. Per Mr. Chanel Reeves, they will need insurance pre-cert prior to return. P.T bhumi completed. Asked him to start pre-cert as soon as O.t eval placed in chart. He agreed.    Rubio Caldwell [facility]  [phone]

## 2020-10-12 NOTE — PROGRESS NOTES
Vascular Surgery Progress Note    Pt is being seen in f/u today regarding L LE DVT    Subjective  Pt s/e. Continues to complain of pain in his left ankle, posterior knee, and groin. It is worse with standing. Swelling has improved. Denies shortness of breath or chest pain. Transfused pRBC yesterday for 6.3, H/H 7.1 today. Denies bleeding issues. Current Medications:    dextrose        oxyCODONE **OR** oxyCODONE, glucose, dextrose, glucagon (rDNA), dextrose, sodium chloride flush, acetaminophen, ondansetron, loperamide, melatonin, [DISCONTINUED] acetaminophen **OR** acetaminophen, polyethylene glycol, promethazine **OR** [DISCONTINUED] ondansetron    potassium chloride  20 mEq Oral BID WC    apixaban  5 mg Oral BID    magnesium sulfate  2 g Intravenous Once    sodium chloride flush  10 mL Intravenous 2 times per day    cholestyramine  1 packet Oral BID    cyclobenzaprine  10 mg Oral TID    DULoxetine  30 mg Oral Daily    fenofibrate  54 mg Oral Daily    ferrous sulfate  325 mg Oral BID    fluticasone  2 spray Nasal Daily    hydroxyurea  500 mg Oral BID    insulin glargine  25 Units Subcutaneous Daily    metoprolol tartrate  25 mg Oral BID    pantoprazole  40 mg Oral Daily    pravastatin  20 mg Oral Nightly    insulin lispro  0-6 Units Subcutaneous TID WC    insulin lispro  0-3 Units Subcutaneous Nightly      PHYSICAL EXAM:    /72   Pulse 120   Temp 98.7 °F (37.1 °C) (Oral)   Resp 18   Ht 6' 1\" (1.854 m)   Wt 262 lb (118.8 kg)   SpO2 100%   BMI 34.57 kg/m²     Intake/Output Summary (Last 24 hours) at 10/12/2020 1322  Last data filed at 10/12/2020 0531  Gross per 24 hour   Intake 755 ml   Output 1350 ml   Net -595 ml        Gen awake, alert, oriented  CVS tachycardic  Resp easy, nonlabored. On RA  Abd soft, ndnt  R LE edema present  L LE edema present, improved. No longer ttp.      LABS:    Lab Results   Component Value Date    WBC 7.1 10/12/2020    HGB 7.1 (L) 10/12/2020    HCT 22.3

## 2020-10-12 NOTE — PROGRESS NOTES
Physical Therapy  Physical Therapy Initial Assessment     Name: Ashley Stanley  : 1975  MRN: [id number]    Referring Provider: Liz Arvizu MD    Date of Service: 10/12/2020    Evaluating PT: Sussy Brito PT, DPT, [id number]    Room #: 3765/8487-P  Diagnosis: DVT  PMHx/PSHx: Displacement of lumbar intervertebral disc without myelopathy, chronic back pain, head injury, OA, HTN, fibromyalgia, DM, HLD, depression  Procedure/Surgery: Placement of catheter in IVC, venogram of IVC and left lower extremity, placement of EKOS lysis catheter (10/8), venogram of IVC and left lower extremity with existing lysis catheter and removal of lysis catheter (10/11)  Pertinent Information: Exploratory laparotomy, cholecystectomy, bowel resection, and R hemicolectomy (20)  Precautions: Fall risk, Droplet Plus Isolation (COVID-19), abdominal precautions, R wrist drop, NWB R UE (per patient)    SUBJECTIVE:    Pt was admitted from [place] [address]. Pt lives alone in an  with 2 stairs and a rail to enter. Pt ambulated with [facility] for short distances with PT at [facility]. OBJECTIVE:   Initial Evaluation  Date: 10/12/20 Treatment Date: Short Term/ Long Term   Goals   AM-PAC 6 Clicks      Was pt agreeable to Eval/treatment? Yes     Does pt have pain? -10/10 R shoulder, lower buttocks, groin, L ankle; RN aware     Bed Mobility  Rolling: Supervision  Supine to sit: Supervision  Sit to supine: Supervision  Scooting: Supervision to EOB  Rolling: Independent   Supine to sit:  Independent   Sit to supine: Independent   Scooting: Independent    Transfers Sit to stand: SBA  Stand to sit: SBA  Stand pivot: NT  Sit to stand: Supervision  Stand to sit: Supervision  Stand pivot: Supervision with SPC   Ambulation   Sidestepped 2 feet x2 HHA with Min A  >50 feet with SPC with Supervision   Stair negotiation: ascended and descended NT  >4 step(s) with 1 rail(s) with Supervision   ROM BUE: Refer to OT note  BLE: WFL     Strength BUE: Refer to OT note  BLE: WFL     Balance Sitting EOB: Supervision  Dynamic Standing: Min A with HHA  Sitting EOB: Independent   Dynamic Standing: Supervision with SPC     Pt is A & O x: 4 to person, place, month/year, and situation. Sensation: Pt reports chronic B hands and feet numbness and tingling due to neuropathy. Edema: L LE swelling noted. Pt reports swelling has improved since surgery. Therapeutic Exercises:   B LAQ x30 reps  Ankle pumps x30 reps    Patient education  Pt educated on health benefits of functional mobility while in hospital setting. Patient response to education:   Pt verbalized understanding Pt demonstrated skill Pt requires further education in this area   Yes Partial  Yes     ASSESSMENT:    Vitals on RA:  Rest: O2 sat 96%,  bpm  Sitting EOB: O2 sat 98%,  bpm  After sit to stand and sidestepping at EOB: O2 sat 96%,  bpm  After second sit to stand and sidestep at EOB: O2 sat 97%,  bpm  Conclusion of session: O2 sat 97%,  bpm    Comments:   Pt was supine in bed upon room entry, agreeable to PT evaluation. Vitals were monitored throughout session. Pt is pleasant but can be self limiting due to pain. Pt used log roll technique during supine to sit transfer and reports he has been using this technique since abdominal surgery last month. Pt complained of L LE pain when sitting at EOB. Pt completed all therapeutic exercises noted above in attempt to improve mobility of L LE. Pt sat at EOB for 10+ minutes. Pt completed x2 sit <> stand transfers from EOB. Pt complained of L LE pain when standing and knee appeared to buckle when sidestepping. Pt declined to ambulate or pivot to chair this date and said \"maybe tomorrow\". Pt was strongly encouraged and health benefits of activity were reinforced; pt continued to decline. Pt was left seated at EOB with all needs met at conclusion of session. RN was notified of pt's performance.     Treatment:  Patient practiced and was instructed in the following treatment:     Therapeutic activities: Pt completed all therapeutic activities noted above. Vitals and symptoms were skillfully monitored with all activity and during rest breaks. Pt was cued for log roll technique during supine to sit transfer. Pt sat at EOB for 10+ minutes as he improved tolerance to upright and completed therapeutic exercises. Pt completed x2 transfers from EOB. Pt sidestepped at EOB as he was cued for sequencing and upright posture. Pt was educated on health benefits of functional mobility while in hospital.    Therapeutic exercises: Pt completed all therapeutic exercises noted above. Pt was cued for technique, reps, and sets. Pt's/family goals:   1. To return to [facility]. Patient and or family understand(s) diagnosis, prognosis, and plan of care. Yes. PLAN:    Current Treatment Recommendations   [x] Strengthening     [x] ROM   [] Balance Training   [x] Endurance Training   [x] Transfer Training   [x] Gait Training   [x] Stair Training   [] Positioning   [x] Safety and Education Training   [x] Patient/Caregiver Education   [x] HEP  [] Other     PT care will be provided in accordance with the objectives noted above. Exercises and functional mobility practice will be used as well as appropriate assistive devices or modalities to obtain goals. Patient and family education will also be administered as needed. Frequency of treatments: 2-5x/week x 2-3 days. Time in: 1045  Time out: 1115    Total Treatment Time 23 minutes     Evaluation Time includes thorough review of current medical information, gathering information on past medical history/social history and prior level of function, completion of standardized testing/informal observation of tasks, assessment of data and education on plan of care and goals.     CPT codes:  [] Low Complexity PT evaluation 25896  [x] Moderate Complexity PT evaluation 80698  [] High Complexity PT evaluation 71458  [] PT Re-evaluation 85839  [] Gait training 48598 0 minutes  [] Manual therapy 30272 0 minutes  [x] Therapeutic activities 81107 23 minutes  [] Therapeutic exercises 20296 0 minutes  [] Neuromuscular reeducation 42973 0 minutes     Iker Funes, PT, DPT  [id number]

## 2020-10-12 NOTE — PROGRESS NOTES
Strength ROM Additional Info:    RUE   UE ROM present   R wrist drop   Dependent        LUE 4-/5  WFL good  and wfl FMC/dexterity noted during ADL tasks       Hearing: [facility]   Sensation:  No c/o numbness or tingling    Comments: Upon arrival patient lying in bed . At end of session, patient returned to bed  with call light and phone within reach, all lines and tubes intact. Overall patient demonstrated  decreased independence and safety during completion of ADL/functional transfer/mobility tasks. Pt would benefit from continued skilled OT to increase safety and independence with completion of ADL/IADL tasks for functional independence and quality of life. Eval Complexity: Low    Assessment of current deficits   Functional mobility [x]  ADLs [x] Strength [x]  Cognition []  Functional transfers  [x] IADLs [x] Safety Awareness [x]  Endurance [x]  Fine Motor Coordination [] Balance [x] Vision/perception [] Sensation []   Gross Motor Coordination [] ROM [] Delirium []                  Motor Control []    Plan of Care:  OT 1-3x/week for 5-7 days PRN   [x] ADL retraining/AE recommendations as needed/appropriate   [x] Energy Conservation Techniques/Strategies      [] Neuromuscular Re-Education      [x] Functional Transfer Training         [x] Functional Mobility Training          [] Cognitive Re-Training          [] Splinting/Positioning Needs           [x] Therapeutic Activity   []Therapeutic Exercise   [x] Visual/Perceptual   [] Delirium Prevention/Treatment   [x] Positioning to Improve Functional [place], Safety, and Skin Integrity   [x] Patient and/or Family Education to Increase Safety and Functional [place]   [x] Other:                   Rehab Potential: Good for established goals     Patient / Family Goal: none stated       Patient and/or family were instructed on functional diagnosis, prognosis/goals and OT plan of care. Demonstrated fair  understanding.         Time In:9503 Time Out: 1350                          Treatment Charges  Mins Units    OT EvalLow 97165 x 1   OT Eval Medium 12052     OT eval High 69315     Ot Re-Eval 57551     Ther Ex  73580     Manual Therapy 54148     Thera Activities 55523     ADL/Home Mgt 49496     Neuro Re-ed 82464     Group Therapy      Orthotic manage/training  92509     Non-Billable Time     Total Timed Treatment         Evaluation time includes thorough review of current medical information, gathering information on past medical history/social history and prior level of function, completion of standardized testing/informal observation of tasks, assessment of data, and development of POC.               Em Bui OTR/L 906389

## 2020-10-12 NOTE — PLAN OF CARE
Problem: Skin Integrity:  Goal: Will show no infection signs and symptoms  Description: Will show no infection signs and symptoms  10/12/2020 0143 by Jose Moncada RN  Outcome: Met This Shift  10/11/2020 2022 by Yolanda Marquis RN  Outcome: Met This Shift  Goal: Absence of new skin breakdown  Description: Absence of new skin breakdown  10/12/2020 0143 by Jose Moncada RN  Outcome: Met This Shift  10/11/2020 2022 by Yolanda Marquis RN  Outcome: Met This Shift     Problem: Falls - Risk of:  Goal: Will remain free from falls  Description: Will remain free from falls  10/12/2020 0143 by Jose Moncada RN  Outcome: Met This Shift  10/11/2020 2022 by Yolanda Marquis RN  Outcome: Met This Shift  Goal: Absence of physical injury  Description: Absence of physical injury  10/12/2020 0143 by Jose Moncada RN  Outcome: Met This Shift  10/11/2020 2022 by Yolanda Marquis RN  Outcome: Met This Shift     Problem: Pain:  Goal: Pain level will decrease  Description: Pain level will decrease  10/12/2020 0143 by Jose Moncada RN  Outcome: Met This Shift  10/11/2020 2022 by Yolanda Marquis RN  Outcome: Ongoing  Goal: Control of acute pain  Description: Control of acute pain  10/12/2020 0143 by Jose Moncada RN  Outcome: Met This Shift  10/11/2020 2022 by Yolanda Marquis RN  Outcome: Ongoing  Goal: Control of chronic pain  Description: Control of chronic pain  10/12/2020 0143 by Jose Moncada RN  Outcome: Met This Shift  10/11/2020 2022 by Yolanda Marquis RN  Outcome: Ongoing     Problem: Airway Clearance - Ineffective  Goal: Achieve or maintain patent airway  Outcome: Met This Shift     Problem: Gas Exchange - Impaired  Goal: Absence of hypoxia  Outcome: Met This Shift

## 2020-10-13 PROBLEM — E78.49 OTHER HYPERLIPIDEMIA: Status: ACTIVE | Noted: 2017-10-13

## 2020-10-13 PROBLEM — T14.8XXA SKIN WOUND FROM SURGICAL INCISION: Status: ACTIVE | Noted: 2020-08-13

## 2020-10-13 PROBLEM — U07.1 COVID-19 VIRUS INFECTION: Status: ACTIVE | Noted: 2020-10-09

## 2020-10-13 LAB
ANION GAP SERPL CALCULATED.3IONS-SCNC: 11 MMOL/L (ref 7–16)
BUN BLDV-MCNC: 4 MG/DL (ref 6–20)
CALCIUM SERPL-MCNC: 8.5 MG/DL (ref 8.6–10.2)
CHLORIDE BLD-SCNC: 102 MMOL/L (ref 98–107)
CO2: 26 MMOL/L (ref 22–29)
CREAT SERPL-MCNC: 0.6 MG/DL (ref 0.7–1.2)
GFR AFRICAN AMERICAN: >60
GFR NON-AFRICAN AMERICAN: >60 ML/MIN/1.73
GLUCOSE BLD-MCNC: 101 MG/DL (ref 74–99)
HCT VFR BLD CALC: 30.6 % (ref 37–54)
HCT VFR BLD CALC: 31.9 % (ref 37–54)
HCT VFR BLD CALC: 35.7 % (ref 37–54)
HEMOGLOBIN: 10 G/DL (ref 12.5–16.5)
HEMOGLOBIN: 10.6 G/DL (ref 12.5–16.5)
HEMOGLOBIN: 9.5 G/DL (ref 12.5–16.5)
MCH RBC QN AUTO: 27.9 PG (ref 26–35)
MCHC RBC AUTO-ENTMCNC: 31 % (ref 32–34.5)
MCV RBC AUTO: 90 FL (ref 80–99.9)
METER GLUCOSE: 106 MG/DL (ref 74–99)
METER GLUCOSE: 115 MG/DL (ref 74–99)
METER GLUCOSE: 118 MG/DL (ref 74–99)
METER GLUCOSE: 98 MG/DL (ref 74–99)
PDW BLD-RTO: 17.7 FL (ref 11.5–15)
PLATELET # BLD: 555 E9/L (ref 130–450)
PMV BLD AUTO: 8.7 FL (ref 7–12)
POTASSIUM SERPL-SCNC: 3.7 MMOL/L (ref 3.5–5)
RBC # BLD: 3.4 E12/L (ref 3.8–5.8)
SODIUM BLD-SCNC: 139 MMOL/L (ref 132–146)
WBC # BLD: 7.5 E9/L (ref 4.5–11.5)

## 2020-10-13 PROCEDURE — 2140000000 HC CCU INTERMEDIATE R&B

## 2020-10-13 PROCEDURE — 85014 HEMATOCRIT: CPT

## 2020-10-13 PROCEDURE — 82962 GLUCOSE BLOOD TEST: CPT

## 2020-10-13 PROCEDURE — 36415 COLL VENOUS BLD VENIPUNCTURE: CPT

## 2020-10-13 PROCEDURE — 2580000003 HC RX 258: Performed by: SURGERY

## 2020-10-13 PROCEDURE — 99232 SBSQ HOSP IP/OBS MODERATE 35: CPT | Performed by: INTERNAL MEDICINE

## 2020-10-13 PROCEDURE — 6370000000 HC RX 637 (ALT 250 FOR IP): Performed by: SURGERY

## 2020-10-13 PROCEDURE — 85027 COMPLETE CBC AUTOMATED: CPT

## 2020-10-13 PROCEDURE — 99233 SBSQ HOSP IP/OBS HIGH 50: CPT | Performed by: INTERNAL MEDICINE

## 2020-10-13 PROCEDURE — 85018 HEMOGLOBIN: CPT

## 2020-10-13 PROCEDURE — 80048 BASIC METABOLIC PNL TOTAL CA: CPT

## 2020-10-13 PROCEDURE — 6370000000 HC RX 637 (ALT 250 FOR IP): Performed by: NURSE PRACTITIONER

## 2020-10-13 RX ORDER — OXYCODONE HYDROCHLORIDE 10 MG/1
10 TABLET ORAL EVERY 6 HOURS PRN
Status: DISCONTINUED | OUTPATIENT
Start: 2020-10-13 | End: 2020-10-14

## 2020-10-13 RX ORDER — ACETAMINOPHEN 325 MG/1
650 TABLET ORAL EVERY 6 HOURS PRN
Status: DISCONTINUED | OUTPATIENT
Start: 2020-10-13 | End: 2020-10-13 | Stop reason: SDUPTHER

## 2020-10-13 RX ORDER — OXYCODONE HYDROCHLORIDE 5 MG/1
5 TABLET ORAL EVERY 6 HOURS PRN
Status: DISCONTINUED | OUTPATIENT
Start: 2020-10-13 | End: 2020-10-15 | Stop reason: HOSPADM

## 2020-10-13 RX ADMIN — FENOFIBRATE 54 MG: 54 TABLET ORAL at 08:20

## 2020-10-13 RX ADMIN — Medication 3 MG: at 20:47

## 2020-10-13 RX ADMIN — APIXABAN 5 MG: 5 TABLET, FILM COATED ORAL at 20:47

## 2020-10-13 RX ADMIN — OXYCODONE HYDROCHLORIDE 10 MG: 10 TABLET ORAL at 08:21

## 2020-10-13 RX ADMIN — APIXABAN 5 MG: 5 TABLET, FILM COATED ORAL at 08:21

## 2020-10-13 RX ADMIN — OXYCODONE HYDROCHLORIDE 10 MG: 10 TABLET ORAL at 14:06

## 2020-10-13 RX ADMIN — CYCLOBENZAPRINE 10 MG: 10 TABLET, FILM COATED ORAL at 20:47

## 2020-10-13 RX ADMIN — METOPROLOL TARTRATE 25 MG: 25 TABLET, FILM COATED ORAL at 20:47

## 2020-10-13 RX ADMIN — Medication 10 ML: at 08:22

## 2020-10-13 RX ADMIN — POTASSIUM CHLORIDE 20 MEQ: 1500 TABLET, EXTENDED RELEASE ORAL at 08:21

## 2020-10-13 RX ADMIN — HYDROXYUREA 500 MG: 500 CAPSULE ORAL at 20:47

## 2020-10-13 RX ADMIN — HYDROXYUREA 500 MG: 500 CAPSULE ORAL at 08:21

## 2020-10-13 RX ADMIN — OXYCODONE HYDROCHLORIDE 10 MG: 10 TABLET ORAL at 03:58

## 2020-10-13 RX ADMIN — ACETAMINOPHEN 650 MG: 325 TABLET ORAL at 16:47

## 2020-10-13 RX ADMIN — FERROUS SULFATE TAB 325 MG (65 MG ELEMENTAL FE) 325 MG: 325 (65 FE) TAB at 08:21

## 2020-10-13 RX ADMIN — POTASSIUM CHLORIDE 20 MEQ: 1500 TABLET, EXTENDED RELEASE ORAL at 16:33

## 2020-10-13 RX ADMIN — CHOLESTYRAMINE 4 G: 4 POWDER, FOR SUSPENSION ORAL at 09:05

## 2020-10-13 RX ADMIN — DULOXETINE HYDROCHLORIDE 30 MG: 30 CAPSULE, DELAYED RELEASE ORAL at 08:20

## 2020-10-13 RX ADMIN — CYCLOBENZAPRINE 10 MG: 10 TABLET, FILM COATED ORAL at 08:20

## 2020-10-13 RX ADMIN — OXYCODONE HYDROCHLORIDE 10 MG: 10 TABLET ORAL at 20:47

## 2020-10-13 RX ADMIN — FERROUS SULFATE TAB 325 MG (65 MG ELEMENTAL FE) 325 MG: 325 (65 FE) TAB at 20:47

## 2020-10-13 RX ADMIN — Medication 10 ML: at 20:47

## 2020-10-13 RX ADMIN — PRAVASTATIN SODIUM 20 MG: 20 TABLET ORAL at 20:47

## 2020-10-13 RX ADMIN — LOPERAMIDE HYDROCHLORIDE 2 MG: 2 CAPSULE ORAL at 20:47

## 2020-10-13 RX ADMIN — FLUTICASONE PROPIONATE 2 SPRAY: 50 SPRAY, METERED NASAL at 08:29

## 2020-10-13 RX ADMIN — CHOLESTYRAMINE 4 G: 4 POWDER, FOR SUSPENSION ORAL at 20:47

## 2020-10-13 RX ADMIN — PANTOPRAZOLE SODIUM 40 MG: 40 TABLET, DELAYED RELEASE ORAL at 08:21

## 2020-10-13 RX ADMIN — CYCLOBENZAPRINE 10 MG: 10 TABLET, FILM COATED ORAL at 14:06

## 2020-10-13 RX ADMIN — METOPROLOL TARTRATE 25 MG: 25 TABLET, FILM COATED ORAL at 08:21

## 2020-10-13 ASSESSMENT — PAIN DESCRIPTION - FREQUENCY
FREQUENCY: CONTINUOUS

## 2020-10-13 ASSESSMENT — PAIN DESCRIPTION - ORIENTATION
ORIENTATION: LOWER
ORIENTATION: LEFT
ORIENTATION: LOWER

## 2020-10-13 ASSESSMENT — PAIN SCALES - GENERAL
PAINLEVEL_OUTOF10: 9
PAINLEVEL_OUTOF10: 8
PAINLEVEL_OUTOF10: 10
PAINLEVEL_OUTOF10: 8
PAINLEVEL_OUTOF10: 8
PAINLEVEL_OUTOF10: 9
PAINLEVEL_OUTOF10: 10
PAINLEVEL_OUTOF10: 8
PAINLEVEL_OUTOF10: 10
PAINLEVEL_OUTOF10: 9

## 2020-10-13 ASSESSMENT — PAIN DESCRIPTION - ONSET
ONSET: ON-GOING
ONSET: ON-GOING

## 2020-10-13 ASSESSMENT — PAIN DESCRIPTION - PAIN TYPE
TYPE: CHRONIC PAIN
TYPE: CHRONIC PAIN

## 2020-10-13 ASSESSMENT — PAIN - FUNCTIONAL ASSESSMENT
PAIN_FUNCTIONAL_ASSESSMENT: ACTIVITIES ARE NOT PREVENTED
PAIN_FUNCTIONAL_ASSESSMENT: ACTIVITIES ARE NOT PREVENTED

## 2020-10-13 ASSESSMENT — PAIN DESCRIPTION - LOCATION
LOCATION: BACK
LOCATION: LEG
LOCATION: BACK

## 2020-10-13 ASSESSMENT — PAIN DESCRIPTION - DESCRIPTORS
DESCRIPTORS: ACHING;DISCOMFORT;CONSTANT
DESCRIPTORS: ACHING;CONSTANT
DESCRIPTORS: ACHING;CONSTANT;DISCOMFORT

## 2020-10-13 ASSESSMENT — PAIN DESCRIPTION - PROGRESSION
CLINICAL_PROGRESSION: NOT CHANGED

## 2020-10-13 ASSESSMENT — PAIN SCALES - WONG BAKER
WONGBAKER_NUMERICALRESPONSE: 0
WONGBAKER_NUMERICALRESPONSE: 0

## 2020-10-13 NOTE — ACP (ADVANCE CARE PLANNING)
Advance Care Planning     Advance Care Planning Activator (Inpatient)  Conversation Note      Date of ACP Conversation: 10/7/2020    Conversation Conducted with: Patient with Decision Making Capacity    ACP Activator: Herve Somers    *When Decision Maker makes decisions on behalf of the incapacitated patient: Decision Maker is asked to consider and make decisions based on patient values, known preferences, or best interests. Health Care Decision Maker:     Current Designated Health Care Decision Maker:   Primary Decision Maker: Shawna Maguire [facility] - [phone]  (If there is a [address] named in the \"Healthcare Decision Makers\" box in the ACP activity, but it is not visible above, be sure to open that field and then select the health care decision maker relationship (ie \"primary\") in the blank space to the right of the name.) Validate  this information as still accurate & up-to-date; edit [address] field as needed.)    Note: Assess and validate information in current ACP documents, as indicated. If no Decision Maker listed above or available through scanned documents, then:    If no Authorized Decision Maker has previously been identified, then patient chooses [address]:  \"Who would you like to name as your primary health care decision-maker? \"               Name: Tay Luther       Relationship:  father        Phone number: [phone]  Rodrigo Ku this person be reached easily? \" Yes  \"Who would you like to name as your back-up decision maker? \"   Name: Misa Long        Relationship: mother         Phone number: [phone]  Rodrigo Ku this person be reached easily? \" Yes    Note: If the relationship of these Decision-Makers to the patient does NOT follow your state's Next of Kin hierarchy, recommend that patient complete ACP document that meets state-specific requirements to allow them to act on the patient's behalf when appropriate.     Care Preferences    Ventilation: \"If you were in your present state of health and suddenly became very ill and were unable to breathe on your own, what would your preference be about the use of a ventilator (breathing machine) if it were available to you? \"      Would the patient desire the use of ventilator (breathing machine)?: yes    \"If your health worsens and it becomes clear that your chance of recovery is unlikely, what would your preference be about the use of a ventilator (breathing machine) if it were available to you? \"     Would the patient desire the use of ventilator (breathing machine)?: No      Resuscitation  \"CPR works best to restart the heart when there is a sudden event, like a heart attack, in someone who is otherwise healthy. Unfortunately, CPR does not typically restart the heart for people who have serious health conditions or who are very sick. \"    \"In the event your heart stopped as a result of an underlying serious health condition, would you want attempts to be made to restart your heart (answer \"yes\" for attempt to resuscitate) or would you prefer a natural death (answer \"no\" for do not attempt to resuscitate)? \" yes      NOTE: If the patient has a valid advance directive AND now provides care preference(s) that are inconsistent with that prior directive, advise the patient to consider either: creating a new advance directive that complies with state-specific requirements; or, if that is not possible, orally revoking that prior directive in accordance with state-specific requirements, which must be documented in the EHR. [] Yes   [x] No   Educated Patient / Venancio Mccarty regarding differences between Advance Directives and portable DNR orders.     Length of ACP Conversation in minutes:      Conversation Outcomes:  [x] ACP discussion completed  [x] Existing advance directive reviewed with patient; no changes to patient's previously recorded wishes  [] New Advance Directive completed  [] Portable Do Not Rescitate prepared for Provider review and signature  [] POLST/POST/MOLST/MOST prepared for Provider review and signature      Follow-up plan:    [] Schedule follow-up conversation to continue planning  [x] Referred individual to Provider for additional questions/concerns   [] Advised patient/agent/surrogate to review completed ACP document and update if needed with changes in condition, patient preferences or care setting    [] This note routed to one or more involved healthcare providers

## 2020-10-13 NOTE — PROGRESS NOTES
Vascular Surgery Progress Note    Pt is being seen in f/u today regarding L LE DVT    Subjective  Pt s/e. States pain more reasonably controlled on po pain medication. Swelling has improved. Denies shortness of breath or chest pain. H/H stable. Denies bleeding issues. Current Medications:    dextrose        oxyCODONE **OR** oxyCODONE, glucose, dextrose, glucagon (rDNA), dextrose, sodium chloride flush, acetaminophen, ondansetron, loperamide, melatonin, [DISCONTINUED] acetaminophen **OR** acetaminophen, polyethylene glycol, promethazine **OR** [DISCONTINUED] ondansetron    potassium chloride  20 mEq Oral BID WC    apixaban  5 mg Oral BID    magnesium sulfate  2 g Intravenous Once    sodium chloride flush  10 mL Intravenous 2 times per day    cholestyramine  1 packet Oral BID    cyclobenzaprine  10 mg Oral TID    DULoxetine  30 mg Oral Daily    fenofibrate  54 mg Oral Daily    ferrous sulfate  325 mg Oral BID    fluticasone  2 spray Nasal Daily    hydroxyurea  500 mg Oral BID    insulin glargine  25 Units Subcutaneous Daily    metoprolol tartrate  25 mg Oral BID    pantoprazole  40 mg Oral Daily    pravastatin  20 mg Oral Nightly    insulin lispro  0-6 Units Subcutaneous TID WC    insulin lispro  0-3 Units Subcutaneous Nightly      PHYSICAL EXAM:    /76   Pulse 98   Temp 97.3 °F (36.3 °C) (Oral)   Resp 18   Ht 6' 1\" (1.854 m)   Wt 262 lb (118.8 kg)   SpO2 98%   BMI 34.57 kg/m²     Intake/Output Summary (Last 24 hours) at 10/13/2020 1403  Last data filed at 10/12/2020 1839  Gross per 24 hour   Intake 320 ml   Output 500 ml   Net -180 ml        Gen awake, alert, oriented  CVS RRR  Resp easy, nonlabored. On RA  Abd soft, ndnt  R LE edema present  L LE edema present, improved. No longer ttp.      LABS:    Lab Results   Component Value Date    WBC 7.5 10/13/2020    HGB 9.5 (L) 10/13/2020    HCT 30.6 (L) 10/13/2020     (H) 10/13/2020    PROTIME 16.2 (H) 10/11/2020    INR 1.4 10/11/2020    APTT 36.3 (H) 10/11/2020    K 3.7 10/13/2020    BUN 4 (L) 10/13/2020    CREATININE 0.6 (L) 10/13/2020     A/P L LE DVT lysis, left external iliac and common femoral vein stenting  · L LE swelling improved  · Continue AC with eliquis  ·  Monitor H/H, stable  · No obvious signs of bleeding. ·  Transfuse as needed  · Continue pain control with oral pain medication. Continue tapering as able. Will change frequency to every 6 hours today. Add tylenol for breakthrough. · Encouraged pt to sit up in chair and ambulate as able  · OK for discharge from vascular standpoint. Will arrange f/u as outpatient. Plan reviewed with Dr. Stovall Head.      [employer], BETTY

## 2020-10-13 NOTE — PROGRESS NOTES
[facility]  Hematology/Oncology  Consult      Patient Name: Kiko Ascencio II  YOB: 1975  PCP: IGOR Ware CNP   Referring Provider:      Reason for Consultation:   Chief Complaint   Patient presents with    Leg Pain     had his spleen removed 2 weeks ago, received Lovenox injections daily at the [facility]. Having left groin and leg pain. Sent in to r/o DVT        Subjective  Remains in isolation due to [address]. Pain better controlled    History of Present Illness: This is a 49-year-old male patient of Dr. Michael Gr who is being seen for thrombocytosis and moderate normocytic anemia. He also has a past medical history of diabetes mellitus type 2, hyperlipidemia, bilateral PE with IVC filter, hypertension, and GI bleed. 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 His platelets were 1.1 million. His out patient work up showed JAK2/CALR/MPL all to be negative. His B12 and folate were within normal limits. He had elevated ferritin and low serum iron with normal TIBC. He was started on Hydrea 500 mg twice daily as well as an aspirin 81 mg. He presented to the emergency room from the SNF for left leg swelling. His platelet count was 495 and Hgb 8.3. He had a venous Doppler of the left extremity which showed evidence for a large deep venous thrombosis involving the entire left leg from the left iliac to the level of the trifurcation trifurcation veins below the knee. He was started on a heparin drip and admitted for further evaluation.       Diagnostic Data:     Past Medical History:   Diagnosis Date    Accident 11/2019    stepped on nail rt ft about 1 month ago- healed per patient    Acute thrombosis of inferior vena cava ([address] 10/10/2020    SIMÓN (acute kidney injury) ([address] 2008 apx    kidney bruised due to fall / Alaina Plump    Allergic rhinitis     Chronic back pain     Depression     Diabetes mellitus ([address]     Difficulty sleeping     at times    Displacement of lumbar intervertebral disc without myelopathy     Fibromyalgia     Fractured rib     2008 / healed    H/O seasonal allergies     Head injury 1980'S apx    no residual s/s    Hyperlipidemia     Hypertension     Obesity (BMI 35.0-39.9 without comorbidity)     bmi 39.2  weight 296 #    Osteoarthritis     Thoracic or lumbosacral neuritis or radiculitis, unspecified        Patient Active Problem List    Diagnosis Date Noted    Acute thrombosis of inferior vena cava ([address] 10/10/2020    Deep vein thrombosis (DVT) present on admission ([address] 10/07/2020    SIMÓN (acute kidney injury) ([address] 09/17/2020    Septicemia ([address] 09/17/2020    Intra-abdominal infection 09/17/2020    Acute GI bleeding 09/03/2020    Acute blood loss anemia     Thrombocytosis ([address] 08/10/2020    Decubitus ulcer of sacral area 08/01/2020    Abnormal findings on diagnostic imaging of gall bladder 08/01/2020    Splenic infarction 08/01/2020    Cyst of spleen 08/01/2020    Splenic abscess 08/01/2020    Anemia 08/01/2020    Other pulmonary embolism without acute cor pulmonale (HCC) 08/01/2020    Enterocolitis 07/31/2020    Rectus diastasis 11/01/2019    Recurrent unilateral inguinal hernia 11/01/2019    Cellulitis of sacral region 07/02/2019    Cellulitis of foot 06/30/2019    Neuropathy 06/30/2019    Cellulitis, wound, post-operative 06/30/2019    Left leg swelling 06/30/2019    SIRS (systemic inflammatory response syndrome) ([address] 06/30/2019    Primary osteoarthritis of right hip 11/03/2016    Primary osteoarthritis of left hip 04/11/2016    Type 2 diabetes mellitus ([address] 04/08/2016    Lumbar disc herniation 02/22/2016    Lumbar radiculopathy 12/17/2015    Osteoarthritis of spine with radiculopathy, lumbar region 12/17/2015    Class 1 obesity in adult 12/17/2015    Allergic rhinitis 11/23/2015    Essential hypertension 10/16/2015    Vitamin D deficiency 04/14/2015    Fibromyalgia 12/15/2014    Insomnia 07/04/2014    Osteoarthritis 03/27/2014    Lumbar spondylosis 01/07/2014    Neuropathic pain 05/08/2012        Past Surgical History:   Procedure Laterality Date    COLONOSCOPY N/A 9/5/2020    COLONOSCOPY WITH BIOPSY performed by Zeferino Smith MD at [address] PTC NEW ACCESS  8/3/2020    CT PTC NEW ACCESS 8/3/2020 [facility] CT    FOOT SURGERY Right 1985    to treat shattered bones    HERNIA REPAIR  2001    DOUBLE HERNIA    HERNIA REPAIR Right 12/9/2019    LAPAROSCOPIC RIGHT INGUINAL HERNIA REPAIR, MESH 10x15 cm PLACEMENT performed by Jaida Flanagan MD at [address] [place] [address] Left 4/11/2016    ILIAC ARTERY STENT INSERTION N/A 10/11/2020    S/P ILIAC STENT PLACEMENT VISUALIZATION performed by Enedina Robledo MD at [place] [address] N/A 9/18/2020    LAPAROTOMY EXPLORATORY, CHOLECYSTECTOMY, BOWEL RESECTION, right darian colectomy, partial omentectomy, and splenectomy performed by Zeferino Smith MD at [address] [place] [address] COLONOSCOPY FLX DX W/MARIA ALEJANDRA Queen 1978 PFRMD N/A 5/7/2018    COLONOSCOPY DIAGNOSTIC performed by Maximo Jacobson MD at [address] [place] [address] Left 2014    Dr. Dana Campos ARTHROSCOPY Left 11 21 14    SHOULDER SURGERY  2001 &2007    RIGHT AND LEFT repair of tears    TOTAL HIP ARTHROPLASTY Right 10/31/2016    Total right hip  Josep Haji MD    UPPER GASTROINTESTINAL ENDOSCOPY N/A 9/4/2020    EGD DIAGNOSTIC ONLY performed by Severo Elizabeth MD at [facility] 1  2007    LEFT WRIST       Family History  Family History   Problem Relation Age of Onset    Hypertension Mother     Arthritis Father     Diabetes Father     Cancer Maternal Grandfather         Skin    Cancer Paternal Uncle         skin    Diabetes Paternal Grandfather     Heart Disease Maternal Grandmother     Stroke Maternal Aunt        Social History    TOBACCO:   reports that he has never smoked. His smokeless tobacco use includes chew. ETOH:   reports previous alcohol use. Home Medications  Prior to Admission medications    Medication Sig Start Date End Date Taking? Authorizing Provider   pregabalin (LYRICA) 150 MG capsule Take 300 mg by mouth 2 times daily.    Yes Historical Provider, MD   cholestyramine (QUESTRAN) 4 g packet Take 1 packet by mouth 2 times daily 9/26/20   Amber Kramer MD   glucose (GLUTOSE) 40 % GEL Take 37.5 mLs by mouth as needed (low sugar) 9/26/20   Amber Kramer MD   insulin glargine (LANTUS) 100 UNIT/ML injection vial Inject 25 Units into the skin Daily 9/27/20   Amber Kramer MD   enoxaparin (LOVENOX) 40 MG/0.4ML injection Inject 0.4 mLs into the skin daily 9/27/20   Amber Kramer MD   metoprolol tartrate (LOPRESSOR) 25 MG tablet Take 1 tablet by mouth 2 times daily 9/11/20   Filomena Nuñez MD   melatonin 3 MG TABS tablet Take 3 mg by mouth nightly as needed (sleep)    Historical Provider, MD   fluticasone (FLONASE) 50 MCG/ACT nasal spray 2 sprays by Nasal route daily    Historical Provider, MD   cyclobenzaprine (FLEXERIL) 10 MG tablet Take 10 mg by mouth three times daily    Historical Provider, MD   fenofibrate (TRICOR) 54 MG tablet Take 54 mg by mouth daily    Historical Provider, MD   ferrous sulfate (IRON 325) 325 (65 Fe) MG tablet Take 325 mg by mouth 2 times daily    Historical Provider, MD   hydroxyurea (HYDREA) 500 MG chemo capsule Take 500 mg by mouth 2 times daily    Historical Provider, MD   loperamide (IMODIUM) 2 MG capsule Take 2 mg by mouth 4 times daily as needed for Diarrhea    Historical Provider, MD   insulin lispro (HUMALOG) 100 UNIT/ML injection vial Inject 3 Units into the skin 3 times daily (before meals) Injects 3 units three times daily before meals in addition to following sliding scale  : 0 units  141-180: 1 unit  181-220: 2 units  221-260: 3 units  261-300: 4 units  301-340: 5 units  >341: 6 units and call MD    Historical Provider, MD   pantoprazole (PROTONIX) 40 MG tablet Take 40 mg by mouth daily    Historical Provider, MD   DULoxetine (CYMBALTA) 30 MG extended release capsule Take 1 capsule by mouth daily 5/14/20   IGOR Salcido CNP   pravastatin (PRAVACHOL) 20 MG tablet Take 1 tablet by mouth nightly 5/14/20   IGOR Salcido CNP       Allergies  Allergies   Allergen Reactions    Seasonal      HAYFEVER / sneezing,watery eyes    Tramadol Itching       Review of Systems: +LLE edema and pain. Otherwise 12 point ROS negative      Objective  /76   Pulse 98   Temp 97.3 °F (36.3 °C) (Oral)   Resp 18   Ht 6' 1\" (1.854 m)   Wt 262 lb (118.8 kg)   SpO2 98%   BMI 34.57 kg/m²     Physical Exam:   Performance Status:  General: AAO to person, place, time, in no acute distress,   Head and neck : PERRLA, EOMI . Sclera non icteric. Oropharynx : Clear  Neck: no JVD,  no adenopathy. Heart: Regular rate and regular rhythm, no murmur  Lungs: Clear to auscultation   Extremities: +LLE edema  Abdomen: Soft, non-tender; +surgical scars  Skin: coccyx wound, abdominal wound covered dressing  Neurologic:Cranial nerves grossly intact. No focal motor or sensory deficits .     Recent Laboratory Data-   Lab Results   Component Value Date    WBC 7.5 10/13/2020    HGB 9.5 (L) 10/13/2020    HCT 30.6 (L) 10/13/2020    MCV 90.0 10/13/2020     (H) 10/13/2020    LYMPHOPCT 13.6 (L) 10/12/2020    RBC 3.40 (L) 10/13/2020    MCH 27.9 10/13/2020    MCHC 31.0 (L) 10/13/2020    RDW 17.7 (H) 10/13/2020    NEUTOPHILPCT 65.9 10/12/2020    MONOPCT 14.9 (H) 10/12/2020    BASOPCT 0.5 10/12/2020    NEUTROABS 5.85 10/12/2020    LYMPHSABS 1.21 (L) 10/12/2020    MONOSABS 1.32 (H) 10/12/2020    EOSABS 0.37 10/12/2020    BASOSABS 0.04 10/12/2020       Lab Results   Component Value Date     10/13/2020    K 3.7 10/13/2020     10/13/2020    CO2 26 10/13/2020    BUN 4 (L) 10/13/2020    CREATININE 0.6 (L) 10/13/2020    GLUCOSE 101 (H) 10/13/2020    CALCIUM 8.5 (L) 10/13/2020    PROT 6.0 (L) 10/07/2020    LABALBU 2.6 (L) 10/07/2020    BILITOT <0.2 10/07/2020    ALKPHOS 171 (H) 10/07/2020    AST 12 10/07/2020    ALT 13 10/07/2020    LABGLOM >60 10/13/2020    GFRAA >60 10/13/2020       Lab Results   Component Value Date    IRON 24 (L) 2020    TIBC 145 (L) 2020    FERRITIN 539 10/10/2020           Radiology-    Ct Abdomen Pelvis W Iv Contrast Additional Contrast? None    Result Date: 2020  Patient MRN:  [id number] : 1975 Age: 53 years Gender: Male Order Date:  2020 4:51 PM EXAM: CT ABDOMEN PELVIS W IV CONTRAST NUMBER OF IMAGES \ views:  414 INDICATION: Dizziness, abdominal pain COMPARISON: Contemporaneous CTA chest study was performed, CT abdomen pelvis with contrast 1912 TECHNIQUE: Helical imaging was extended from the lower chest to the lower pelvis in conjunction with the intravenous administration of 90 mL of Isovue-370, and axial images were supplemented with sagittal and coronal MPR images. Low-dose CT  acquisition technique included one of following options; 1 . Automated exposure control, 2. Adjustment of MA and or KV according to patient's size or 3. Use of iterative reconstruction. FINDINGS: CHEST: Contemporaneous CTA imaging documented bilateral pulmonary emboli without right heart strain or acute pulmonary or pleural complications. LIVER: The liver is uniform in parenchymal density, and no focal lesions are identified. There is diffuse fatty change. GALLBLADDER AND BILIARY TREE: There is a pigtail drainage catheter in the gallbladder fossa, and it is uncertain if this is a post cholecystectomy drain or if this is a cholecystostomy associated with complete collapse of the gallbladder.  There is no biliary ductal ectasia. SPLEEN: A circumscribed hypodense structure in the splenic bed could be a hypodense or infarcted spleen, but shows no evidence of extracapsular fluid or inflammatory change. The finding measures about 10 cm length by about 7 cm in thickness. It is uncertain if this represents residual following splenectomy, but there is no mention in the electronic record of splenectomy. . ADRENALS:  The adrenals are normal in appearance bilaterally. PANCREAS:The pancreas shows no evidence of acute inflammation or mass lesions. KIDNEYS/BLADDER: The kidneys show uniform cortical enhancement and no evidence of outflow obstruction. There is no perinephric inflammatory change. Ureters are similar in course and caliber, and the bladder is normal in appearance. APPENDIX:  Normal BOWEL:  There is no evidence of inflammation, obstruction, or perforation of enteric structures. There is some fluid filled distal ileum, and the previously identified thickening of jejunal loops in the left upper abdomen is again documented. The appearance suggests enteritis with focal stenosis in the anterior right paramedian mid abdomen suggesting a focal stricture. There is no evidence of fistula formation. Crohn's disease would be a differential diagnostic consideration. PELVIS:  There is no pelvic adenopathy or dependent free pelvic fluid. Ricardo Founds LYMPHATICS: Numerous small lymph nodes are clustered about the small bowel mesenteric root, and there are a few borderline prominent lymph nodes beneath the renal vascular pedicles. VASCULAR: There is no evidence of acute vascular pathology involving mesenteric or retroperitoneal great vessels. There is an inferior vena cava filter. SKELETAL: Mild levoscoliotic curvature and degenerative changes of the lumbar spine are noted. There are bilateral hip arthroplasty elements without acute complications.      There is persistent small bowel mural thickening in the midabdomen involving the jejunum and proximal to mid ileum, with an apparent stenosis or focal stricture at about the level of the renal vascular pedicles and inferior vena cava filter in the right paramedian anterior abdomen. There is no evidence of perforation or obstruction, however. There is a pigtail drainage catheter in the gallbladder fossa, which could be a cholecystostomy catheter or a drainage catheter in the gallbladder fossa following cholecystectomy. The gallbladder is present, it is completely collapsed. A cystic appearing structure is noted in the splenic bed, and could be a splenic remnant or a low-density spleen, not prominently enlarged. Xr Chest Portable    Result Date: 2020  Patient MRN:  [id number] : 1975 Age: 53 years Gender: Male Order Date:  2020 11:58 PM TECHNIQUE/NUMBER OF IMAGES/COMPARISON/CLINICAL HISTORY: Chest AP 1 image one view Comparison 10/17/2020. Central venous line placed. FINDINGS: Right internal jugular central venous catheter tip in SVC. There is no pneumothorax on the right breast. Lungs are clear. Heart and small size, mediastinum unremarkable. There is no perihilar vascular congestion. No acute cardiopulmonary process. Xr Chest Portable    Result Date: 2020  Single frontal projection (one view) of the chest. COMPARISON: 2020 FINDINGS: The heart, lungs, mediastinum and regional skeleton are unremarkable. The costophrenic angles are sharp and clear. There is no evidence of mediastinal shift. The heart is not enlarged. There is no evidence of hilar adenopathy. Lungs are negative for evidence of acute airspace consolidation, effusion, atelectasis, pneumothorax, pathologic nodularity or interstitial thickening.  Regional bone density and trabecular pattern are normal.     Negative one view chest.    Cta Pulmonary W Contrast    Result Date: 10/7/2020  EXAMINATION: CTA OF THE CHEST 10/7/2020 2:48 pm TECHNIQUE: CTA of the chest was performed after the administration of intravenous contrast.  Multiplanar reformatted images are provided for review. MIP images are provided for review. Dose modulation, iterative reconstruction, and/or weight based adjustment of the mA/kV was utilized to reduce the radiation dose to as low as reasonably achievable. COMPARISON: September 17, 2020 HISTORY: ORDERING SYSTEM PROVIDED HISTORY: DVT, tachy, r/o PE TECHNOLOGIST PROVIDED HISTORY: Reason for exam:->DVT, tachy, r/o PE What reading provider will be dictating this exam?->CRC FINDINGS: There is a filling defect present within posterior right lower lobe segmental pulmonary artery. Subtle filling defect is present within lingular segmental pulmonary artery as seen on axial image number 125. No evidence of saddle embolism. The heart is normal in size. No pericardial effusion. There is no mediastinal or hilar lymphadenopathy. There are minimal areas of subsegmental atelectasis in lung bases. There is minimal new left pleural effusion. No evidence of pneumonia. There is no pneumothorax. View of the upper abdomen shows normal bilateral adrenal glands. Postsurgical changes with areas of subcutaneous emphysema are associated with subxiphoid anterior abdominal wall. Postsurgical changes are also present within upper abdomen below margin of the liver. 1.  Small filling defect is located within right lower lobe segmental pulmonary artery and small filling defect is present within lingular segmental pulmonary artery. These findings are related to chronic bilateral pulmonary emboli. [place] of pulmonary embolus has decreased compared to prior from September 17, 2020. 2.  New minimal left pleural effusion. 3. Foci of gas are associated with the subxiphoid abdominal wall as well as areas of inflammation located in right upper abdomen below margin of liver. Clinical correlation recommended for recent surgery. Critical results were called by Dr. Jerry Gomez to JOHN Anguiano on 10/7/2020 at 15:06. Cta Pulmonary W Contrast    Result Date: 2020  Patient MRN:  [id number] : 1975 Age: 53 years Gender: Male Order Date:  2020 4:51 PM EXAM: CTA PULMONARY W CONTRAST NUMBER OF IMAGES:  480 INDICATION: Dizziness, cholecystostomy drain COMPARISON: None Technique: Low-dose CT  acquisition technique included one of following options; 1 . Automated exposure control, 2. Adjustment of MA and or KV according to patient's size or 3. Use of iterative reconstruction. Contiguous spiral images were obtained in the axial plane, following the administration of 90 mL of Isovue-370 intravenous contrast using CT angiographic protocol. Sagittal and coronal images were reconstructed from the axial plane acquisition. Addition MIP reconstructions were presented to aid in the interpretation of this study. Findings: The central pulmonary arterial tree shows acute emboli in the descending pulmonary arteries bilaterally, but no areas of regional oligemia, pleural effusion, or peripheral infarction are identified. There is no evidence of right heart strain. Lung window images show no evidence of organized pneumonia or mass lesions. Soft tissue window images show no evidence of mediastinal adenopathy, and the thoracic aorta shows no acute complications. Bone window images show no acute chest wall traumatic findings. Upper abdominal images show no evidence of acute visceral pathology. Diffuse fatty change of the liver is noted. The patient appears to be post cholecystectomy with a pigtail drain in the gallbladder fossa. The adrenals, kidneys, and included bowel structures are unremarkable for some mural thickening of small bowel in the left upper abdomen that could indicate mild colitis. Diverticuli are noted in the left hemicolon. Bilateral central and dependent pulmonary emboli without peripheral infarcts No evidence of pneumonia, pulmonary mass lesions, or cardiac decompensation.  Fatty change of the liver, and there is a pigtail drainage catheter in the gallbladder fossa. Upper abdominal bowel loops show some mural thickening, which is nonspecific, but could indicate enteritis. ALERT:  THIS IS AN ABNORMAL REPORT-bilateral lower lobe central acute pulmonary emboli without complicating right heart strain, pleural effusion, or pulmonary infarcts. Note: The emergency department was contacted telephonically by myself at the time of this dictation communicate findings    Us Dup Lower Extremity Left Jorge    Result Date: 10/7/2020  Patient MRN:  [id number] : 1975 Age: 53 years Gender: Male Order Date:  10/7/2020 2:16 PM EXAM: US DUP LOWER EXTREMITY LEFT JORGE NUMBER OF IMAGES:  25 INDICATION:  leg pain, swelling, r/o DVT leg pain, swelling, r/o DVT What reading provider will be dictating this exam?->[facility] COMPARISON: None There is evidence for deep venous thrombosis, which is occlusive in the left iliac common femoral and profunda superficial femoral and tibial peroneal trunk and the anterior and posterior tibial veins There is otherwise good compressibility, there is good augmentation, there is good color flow. There is evidence for a large deep venous thrombosis involving the entire left leg from the left iliac to the level of the trifurcation trifurcation veins below the knee ALERT:  THIS IS AN ABNORMAL REPORT     Xr Chest Abdomen Ng Placement    Result Date: 2020  Patient MRN: [id number] : 1975 Age:  53 years Gender: Male Order Date: 2020 5:35 AM Exam: XR CHEST ABDOMEN NG PLACEMENT Number of Images: 1 view Indication:   NG placement NG placement Comparison: Prior study from 2020 is available Findings: Study demonstrate lower chest upper abdomen demonstrated the nasogastric tube to be in satisfactory position with the tip in the body of the stomach. There is an IVC filter noted to be in place. There is a right internal jugular catheter with tip in the superior vena cava.  The abdomen gas pattern is nonspecific. Nasogastric tube in satisfactory position Other findings as described above         ASSESSMENT/PLAN :  54 yo male  BL PE s/p IVC  Hx GI bleed  S/p ex lap with cholecystectomy and R hemicolectomy with small bowel resection and side-to-side ileocolonic anastomosis with splenic thrombosis and infarction s/p splenectomy and omentectomy    - LLE DVT, extensive on US  - Likely provoked in nature due to immobility/stasis. Prophylactic dose but he has multiple risk factors  - Seen by vascular, s/p EKOS. Plan for take back to angio tomorrow  - Case discussed with Dr. Meño Macias. We are both in agreement that given his prior surgical intervention, bleeding risk will be low and benefits outweigh risks in regard to DC on full dose oral AC with NOAC (Eliquis)  - Trend CBC  - Will follow    10/9/20  - Patient had repeat angio today which showed residual clot. The catheter was repositioned. currently on TPN heparin.   - Vascular surg following   - Follow-up angio scheduled for tomorrow  - Plts 477 and WBCs 16.8 trending down Hgb 7.6 stable     10/10/20  - Angiogram today  - WBCs 14.3 Hgb 8.5  Plts 523   - AC on DC as above    10/13/20  - S/p EKOS with vascular  - Hgb stable at 9.5  - Platelets elevated, likely reactive  - COVID19+  - OK to DC on systemic AC with Eliquis per my POV and follow with me in 2-3 weeks as outpatient    Arturo Hobbs MD  Electronically signed 10/13/2020 at 3:12 PM  Patient seen and examined personally.  Note edited to reflect my updates  Dale Polanco MD

## 2020-10-13 NOTE — PLAN OF CARE
Problem: Skin Integrity:  Goal: Will show no infection signs and symptoms  Description: Will show no infection signs and symptoms  Outcome: Met This Shift  Goal: Absence of new skin breakdown  Description: Absence of new skin breakdown  Outcome: Met This Shift     Problem: Falls - Risk of:  Goal: Will remain free from falls  Description: Will remain free from falls  Outcome: Met This Shift  Goal: Absence of physical injury  Description: Absence of physical injury  Outcome: Met This Shift     Problem: Pain:  Goal: Pain level will decrease  Description: Pain level will decrease  Outcome: Met This Shift  Goal: Control of acute pain  Description: Control of acute pain  Outcome: Met This Shift  Goal: Control of chronic pain  Description: Control of chronic pain  Outcome: Met This Shift     Problem: Airway Clearance - Ineffective  Goal: Achieve or maintain patent airway  Outcome: Met This Shift     Problem: Gas Exchange - Impaired  Goal: Absence of hypoxia  Outcome: Met This Shift  Goal: Promote optimal lung function  Outcome: Met This Shift     Problem: Breathing Pattern - Ineffective  Goal: Ability to achieve and maintain a regular respiratory rate  Outcome: Met This Shift     Problem:  Body Temperature -  Risk of, Imbalanced  Goal: Ability to maintain a body temperature within defined limits  Outcome: Met This Shift  Goal: Will regain or maintain usual level of consciousness  Outcome: Met This Shift  Goal: Complications related to the disease process, condition or treatment will be avoided or minimized  Outcome: Met This Shift     Problem: Isolation Precautions - Risk of Spread of Infection  Goal: Prevent transmission of infection  Outcome: Met This Shift     Problem: Nutrition Deficits  Goal: Optimize nutrtional status  Outcome: Met This Shift     Problem: Risk for Fluid Volume Deficit  Goal: Maintain normal heart rhythm  Outcome: Met This Shift  Goal: Maintain absence of muscle cramping  Outcome: Met This Shift  Goal: Maintain normal serum potassium, sodium, calcium, phosphorus, and pH  Outcome: Met This Shift     Problem: Loneliness or Risk for Loneliness  Goal: Demonstrate positive use of time alone when socialization is not possible  Outcome: Met This Shift     Problem: Fatigue  Goal: Verbalize increase energy and improved vitality  Outcome: Met This Shift     Problem: Patient Education: Go to Patient Education Activity  Goal: Patient/Family Education  Outcome: Met This Shift

## 2020-10-13 NOTE — PROGRESS NOTES
Hospitalist Progress Note  [facility]       Patient: Brady Keller  Unit/Bed: 1662/3493-A  YOB: 1975  MRN: [id number]  Acct: [de-identified]   AdmittingDiagnosis: DVT (deep vein thrombosis) in pregnancy [O22.30]  DVT (deep vein thrombosis) in pregnancy [O22.30]  Deep vein thrombosis (DVT) present on admission [facility]) [I82.409]  Deep vein thrombosis (DVT) present on admission [facility]) [I82.409]  Admit Date: 10/7/2020  Hospital Day: 6    Subjective:    Patient is having problems with leg pain and swelling    Patient Seen, Chart, Labs, Radiology studies, and Consults reviewed. Objective:   /76   Pulse 98   Temp 97.3 °F (36.3 °C) (Oral)   Resp 18   Ht 6' 1\" (1.854 m)   Wt 262 lb (118.8 kg)   SpO2 98%   BMI 34.57 kg/m²       Intake/Output Summary (Last 24 hours) at 10/13/2020 1525  Last data filed at 10/13/2020 1300  Gross per 24 hour   Intake 760 ml   Output 500 ml   Net 260 ml       Physical Exam  General: AAO to person, place, time, in no acute distress, moderate habitus; BMI 34  Head and neck : PERRLA, EOMI . Sclera non icteric. Oropharynx : Clear  Neck: no JVD,  no adenopathy. Heart: Regular rate and regular rhythm, no murmur  Lungs: Clear to auscultation   Extremities: +LLE edema  Abdomen: Soft, non-tender; +surgical scars  Skin: coccyx wound, abdominal wound covered dressing  Neurologic:Cranial nerves grossly intact.  No focal motor or sensory deficits .     [place]:No  Drains:No  Central Line/port:No   EKG:Yes     Imaging:Yes   Ct Abdomen Pelvis W Iv Contrast Additional Contrast? None    Result Date: 2020  Patient MRN:  [id number] : 1975 Age: 53 years Gender: Male Order Date:  2020 4:51 PM EXAM: CT ABDOMEN PELVIS W IV CONTRAST NUMBER OF IMAGES \ views:  685 INDICATION: Dizziness, abdominal pain COMPARISON: Contemporaneous CTA chest study was performed, CT abdomen pelvis with contrast 8052 TECHNIQUE: Helical imaging was extended from the lower chest to the lower pelvis in conjunction with the intravenous administration of 90 mL of Isovue-370, and axial images were supplemented with sagittal and coronal MPR images. Low-dose CT  acquisition technique included one of following options; 1 . Automated exposure control, 2. Adjustment of MA and or KV according to patient's size or 3. Use of iterative reconstruction. FINDINGS: CHEST: Contemporaneous CTA imaging documented bilateral pulmonary emboli without right heart strain or acute pulmonary or pleural complications. LIVER: The liver is uniform in parenchymal density, and no focal lesions are identified. There is diffuse fatty change. GALLBLADDER AND BILIARY TREE: There is a pigtail drainage catheter in the gallbladder fossa, and it is uncertain if this is a post cholecystectomy drain or if this is a cholecystostomy associated with complete collapse of the gallbladder. There is no biliary ductal ectasia. SPLEEN: A circumscribed hypodense structure in the splenic bed could be a hypodense or infarcted spleen, but shows no evidence of extracapsular fluid or inflammatory change. The finding measures about 10 cm length by about 7 cm in thickness. It is uncertain if this represents residual following splenectomy, but there is no mention in the electronic record of splenectomy. . ADRENALS:  The adrenals are normal in appearance bilaterally. PANCREAS:The pancreas shows no evidence of acute inflammation or mass lesions. KIDNEYS/BLADDER: The kidneys show uniform cortical enhancement and no evidence of outflow obstruction. There is no perinephric inflammatory change. Ureters are similar in course and caliber, and the bladder is normal in appearance. APPENDIX:  Normal BOWEL:  There is no evidence of inflammation, obstruction, or perforation of enteric structures. There is some fluid filled distal ileum, and the previously identified thickening of jejunal loops in the left upper abdomen is again documented.  The appearance suggests enteritis with focal stenosis in the anterior right paramedian mid abdomen suggesting a focal stricture. There is no evidence of fistula formation. Crohn's disease would be a differential diagnostic consideration. PELVIS:  There is no pelvic adenopathy or dependent free pelvic fluid. Yolanda Graven LYMPHATICS: Numerous small lymph nodes are clustered about the small bowel mesenteric root, and there are a few borderline prominent lymph nodes beneath the renal vascular pedicles. VASCULAR: There is no evidence of acute vascular pathology involving mesenteric or retroperitoneal great vessels. There is an inferior vena cava filter. SKELETAL: Mild levoscoliotic curvature and degenerative changes of the lumbar spine are noted. There are bilateral hip arthroplasty elements without acute complications. There is persistent small bowel mural thickening in the midabdomen involving the jejunum and proximal to mid ileum, with an apparent stenosis or focal stricture at about the level of the renal vascular pedicles and inferior vena cava filter in the right paramedian anterior abdomen. There is no evidence of perforation or obstruction, however. There is a pigtail drainage catheter in the gallbladder fossa, which could be a cholecystostomy catheter or a drainage catheter in the gallbladder fossa following cholecystectomy. The gallbladder is present, it is completely collapsed. A cystic appearing structure is noted in the splenic bed, and could be a splenic remnant or a low-density spleen, not prominently enlarged. Xr Chest Portable    Result Date: 10/10/2020  EXAMINATION: ONE XRAY VIEW OF THE CHEST 10/10/2020 11:52 am COMPARISON: 09/17/2020 HISTORY: ORDERING SYSTEM PROVIDED HISTORY: pna TECHNOLOGIST PROVIDED HISTORY: Reason for exam:->pna What reading provider will be dictating this exam?->CRC FINDINGS: The lungs are without acute focal process. There is no effusion or pneumothorax.  The cardiomediastinal silhouette is without acute process. The osseous structures are without acute process. No acute process. Xr Chest Portable    Result Date: 2020  Patient MRN:  [id number] : 1975 Age: 53 years Gender: Male Order Date:  2020 11:58 PM TECHNIQUE/NUMBER OF IMAGES/COMPARISON/CLINICAL HISTORY: Chest AP 1 image one view Comparison 10/17/2020. Central venous line placed. FINDINGS: Right internal jugular central venous catheter tip in SVC. There is no pneumothorax on the right breast. Lungs are clear. Heart and small size, mediastinum unremarkable. There is no perihilar vascular congestion. No acute cardiopulmonary process. Xr Chest Portable    Result Date: 2020  Single frontal projection (one view) of the chest. COMPARISON: 2020 FINDINGS: The heart, lungs, mediastinum and regional skeleton are unremarkable. The costophrenic angles are sharp and clear. There is no evidence of mediastinal shift. The heart is not enlarged. There is no evidence of hilar adenopathy. Lungs are negative for evidence of acute airspace consolidation, effusion, atelectasis, pneumothorax, pathologic nodularity or interstitial thickening. Regional bone density and trabecular pattern are normal.     Negative one view chest.    Cta Pulmonary W Contrast    Result Date: 10/7/2020  EXAMINATION: CTA OF THE CHEST 10/7/2020 2:48 pm TECHNIQUE: CTA of the chest was performed after the administration of intravenous contrast.  Multiplanar reformatted images are provided for review. MIP images are provided for review. Dose modulation, iterative reconstruction, and/or weight based adjustment of the mA/kV was utilized to reduce the radiation dose to as low as reasonably achievable.  COMPARISON: 2020 HISTORY: ORDERING SYSTEM PROVIDED HISTORY: DVT, tachy, r/o PE TECHNOLOGIST PROVIDED HISTORY: Reason for exam:->DVT, tachy, r/o PE What reading provider will be dictating this exam?->CRC FINDINGS: There is a filling defect present within the administration of 90 mL of Isovue-370 intravenous contrast using CT angiographic protocol. Sagittal and coronal images were reconstructed from the axial plane acquisition. Addition MIP reconstructions were presented to aid in the interpretation of this study. Findings: The central pulmonary arterial tree shows acute emboli in the descending pulmonary arteries bilaterally, but no areas of regional oligemia, pleural effusion, or peripheral infarction are identified. There is no evidence of right heart strain. Lung window images show no evidence of organized pneumonia or mass lesions. Soft tissue window images show no evidence of mediastinal adenopathy, and the thoracic aorta shows no acute complications. Bone window images show no acute chest wall traumatic findings. Upper abdominal images show no evidence of acute visceral pathology. Diffuse fatty change of the liver is noted. The patient appears to be post cholecystectomy with a pigtail drain in the gallbladder fossa. The adrenals, kidneys, and included bowel structures are unremarkable for some mural thickening of small bowel in the left upper abdomen that could indicate mild colitis. Diverticuli are noted in the left hemicolon. Bilateral central and dependent pulmonary emboli without peripheral infarcts No evidence of pneumonia, pulmonary mass lesions, or cardiac decompensation. Fatty change of the liver, and there is a pigtail drainage catheter in the gallbladder fossa. Upper abdominal bowel loops show some mural thickening, which is nonspecific, but could indicate enteritis. ALERT:  THIS IS AN ABNORMAL REPORT-bilateral lower lobe central acute pulmonary emboli without complicating right heart strain, pleural effusion, or pulmonary infarcts.  Note: The emergency department was contacted telephonically by myself at the time of this dictation communicate findings    Us Dup Lower Extremity Left Jamaal    Result Date: 10/7/2020  Patient MRN:  [id number] : 1975 Age: 53 years Gender: Male Order Date:  10/7/2020 2:16 PM EXAM: US DUP LOWER EXTREMITY LEFT JORGE NUMBER OF IMAGES:  25 INDICATION:  leg pain, swelling, r/o DVT leg pain, swelling, r/o DVT What reading provider will be dictating this exam?->[facility] COMPARISON: None There is evidence for deep venous thrombosis, which is occlusive in the left iliac common femoral and profunda superficial femoral and tibial peroneal trunk and the anterior and posterior tibial veins There is otherwise good compressibility, there is good augmentation, there is good color flow. There is evidence for a large deep venous thrombosis involving the entire left leg from the left iliac to the level of the trifurcation trifurcation veins below the knee ALERT:  THIS IS AN ABNORMAL REPORT     Xr Chest Abdomen Ng Placement    Result Date: 2020  Patient MRN: [id number] : 1975 Age:  53 years Gender: Male Order Date: 2020 5:35 AM Exam: XR CHEST ABDOMEN NG PLACEMENT Number of Images: 1 view Indication:   NG placement NG placement Comparison: Prior study from 2020 is available Findings: Study demonstrate lower chest upper abdomen demonstrated the nasogastric tube to be in satisfactory position with the tip in the body of the stomach. There is an IVC filter noted to be in place. There is a right internal jugular catheter with tip in the superior vena cava. The abdomen gas pattern is nonspecific. Nasogastric tube in satisfactory position Other findings as described above     Fluoro For Surgical Procedures    Result Date: 10/12/2020  EXAMINATION: SPOT FLUOROSCOPIC IMAGES 10/11/2020 1:16 pm TECHNIQUE: Fluoroscopy was provided by the radiology department for procedure. Radiologist was not present during examination. FLUOROSCOPY DOSE AND TYPE OR TIME AND EXPOSURES: 107.9 seconds. 73.0 seconds cumulative dose.  COMPARISON: None HISTORY: ORDERING SYSTEM PROVIDED HISTORY: iliac stent revision TECHNOLOGIST PROVIDED HISTORY: Reason for exam:->iliac stent revision What reading provider will be dictating this exam?->CRC Intraprocedural imaging. Initial exam FINDINGS: 3 spot images of the pelvis were obtained. Spot views demonstrate images from a right iliac stent revision. Intraprocedural fluoroscopic spot images as above. See separate procedure report for more information.        Diet:  DIET GENERAL;    Medications:    potassium chloride  20 mEq Oral BID WC    apixaban  5 mg Oral BID    magnesium sulfate  2 g Intravenous Once    sodium chloride flush  10 mL Intravenous 2 times per day    cholestyramine  1 packet Oral BID    cyclobenzaprine  10 mg Oral TID    DULoxetine  30 mg Oral Daily    fenofibrate  54 mg Oral Daily    ferrous sulfate  325 mg Oral BID    fluticasone  2 spray Nasal Daily    hydroxyurea  500 mg Oral BID    insulin glargine  25 Units Subcutaneous Daily    metoprolol tartrate  25 mg Oral BID    pantoprazole  40 mg Oral Daily    pravastatin  20 mg Oral Nightly    insulin lispro  0-6 Units Subcutaneous TID WC    insulin lispro  0-3 Units Subcutaneous Nightly     Continuous Infusions:   dextrose       PRNMeds:oxyCODONE **OR** oxyCODONE, glucose, dextrose, glucagon (rDNA), dextrose, sodium chloride flush, acetaminophen, ondansetron, loperamide, melatonin, [DISCONTINUED] acetaminophen **OR** acetaminophen, polyethylene glycol, promethazine **OR** [DISCONTINUED] ondansetron  DVT Prophylaxis:Apixaban and Encourage ambulation, low risk for DVT, no chemical or mechanical prophylaxis necessary    Data:  CBC:  Recent Labs     10/12/20  0355 10/12/20  2030 10/13/20  0408   WBC 7.1 8.9 7.5   RBC 2.50* 3.53* 3.40*   HGB 7.1* 9.8* 9.5*   HCT 22.3* 30.8* 30.6*   MCV 89.2 87.3 90.0   RDW 17.4* 17.6* 17.7*    560* 555*     BMP:  Recent Labs     10/11/20  0545 10/12/20  0355 10/13/20  0408    139 139   K 3.4* 3.9 3.7    112* 102   CO2 23 18* 26   BUN 5* 3* 4*   CREATININE 0.6* 0.4* 0.6*     BNP: No results for input(s): BNP in the last 72 hours. PT/INR:   Recent Labs     10/11/20  0545   PROTIME 16.2*   INR 1.4     :   Recent Labs     10/10/20  1815 10/11/20  0000 10/11/20  0545   APTT 45.6* 40.1* 36.3*     CARDIAC ENZYMES:No results for input(s): CKMB, CKMBINDEX, TROPONINT in the last 72 hours. Invalid input(s): CKTOTAL;3  FASTING LIPID PANEL:  Lab Results   Component Value Date    CHOL 94 09/18/2020    HDL 33 09/18/2020    TRIG 151 (H) 09/18/2020     LIVER PROFILE: No results for input(s): AST, ALT, ALB, BILIDIR, BILITOT, ALKPHOS in the last 72 hours. ABGs: No results found for: PH, PCO2, PO2, HCO3, O2SAT    Hematology Dr. Renay Lowe [10/10/2020]  ASSESSMENT/PLAN :  52 yo male  BL PE s/p IVC  Hx GI bleed  S/p ex lap with cholecystectomy and R hemicolectomy with small bowel resection and side-to-side ileocolonic anastomosis with splenic thrombosis and infarction s/p splenectomy and omentectomy     - LLE DVT, extensive on US  - Likely provoked in nature due to immobility/stasis. Prophylactic dose but he has multiple risk factors  - Seen by vascular, s/p EKOS. Plan for take back to angio tomorrow  - Case discussed with Dr. Meño Macias. We are both in agreement that given his prior surgical intervention, bleeding risk will be low and benefits outweigh risks in regard to DC on full dose oral AC with NOAC (Eliquis)  - Trend CBC  - Will follow    Assessment/Plan:  Principal Problem:    Acute deep vein thrombosis (DVT) (HCC)  Active Problems:    Essential hypertension    Type 2 diabetes mellitus (HCC)    Enterocolitis    Splenic infarction    Other pulmonary embolism without acute cor pulmonale (HCC)    Thrombocytosis (HCC)    Acute blood loss anemia    Skin wound from surgical incision    Other hyperlipidemia    COVID-19 virus infection  Resolved Problems:    * No resolved hospital problems. *    Summary: Mr. Elida Sanders, a 52 yo man was admitted 10/7/2020 for left leg swelling.  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 Pt was discharged to nursing home on lovenox. He presented from SNF for L leg swelling. In ED he was found to have DVT - started on heparin drip and admitted for further evaluation and treatment.   Consultation was placed to vascular surgery, hematology and surgery. He underwent ECMO lysis catheter placement and is getting TPA and heparin infusion and plan is for angiogram [10/13]. COVID-19 positive [10/9/2020]    - [10/13]  -Added apixaban [10/11]. COVID-19 positive [10/9/2020]. RA sat 93-98%.    Continue insulin glargine 25 units/d, lispro insulin 0-6 units subcu TID WC and 0-3 units hs  Continue apixaban, metoprolol titrate 25 mg bid  Pravastatin 20 mg hs, fenofibrate 54 mg/d  Continue hydroxyurea and ferrous sulfate    CODE STATUS: Full    Time Spent: 45 minutes  signed by Angle Ma MD on 10/13/2020 at 3:25 PM    [facility] Medicine Service

## 2020-10-13 NOTE — PROGRESS NOTES
Pulmonary [address] [facility]                             Pulmonary Consult/Progress Note :  Follow covid    Subjective     Doing very well  No SOB   On 1 L O2  No chest pain       Objective     VS: /62 Comment: manual  Pulse 118   Temp 97.3 °F (36.3 °C) (Infrared)   Resp 18   Ht 6' 1\" (1.854 m)   Wt 262 lb (118.8 kg)   SpO2 97%   BMI 34.57 kg/m²           I & O - 24hr:     Intake/Output Summary (Last 24 hours) at 10/12/2020 2324  Last data filed at 10/12/2020 1839  Gross per 24 hour   Intake 825 ml   Output 1000 ml   Net -175 ml       Physical Exam:      Physical Exam:     General: AAO to person, place, time, in no acute distress,   Head and neck : PERRLA, EOMI . Sclera non icteric. Oropharynx : Clear  Neck: no JVD,  no adenopathy. Heart: Regular rate and regular rhythm, no murmur  Lungs: Clear to auscultation   Extremities: +LLE edema  Abdomen: Soft, non-tender; +surgical scars  Skin: coccyx wound, abdominal wound covered dressing  Neurologic:Cranial nerves grossly intact.  No focal motor or sensory deficits .       Labs     CBC with Differential:    Lab Results   Component Value Date    WBC 8.9 10/12/2020    RBC 3.53 10/12/2020    HGB 9.8 10/12/2020    HCT 30.8 10/12/2020     10/12/2020    MCV 87.3 10/12/2020    MCH 27.8 10/12/2020    MCHC 31.8 10/12/2020    RDW 17.6 10/12/2020    NRBC 0.9 08/01/2020    SEGSPCT 80 11/26/2013    LYMPHOPCT 13.6 10/12/2020    MONOPCT 14.9 10/12/2020    BASOPCT 0.5 10/12/2020    MONOSABS 1.32 10/12/2020    LYMPHSABS 1.21 10/12/2020    EOSABS 0.37 10/12/2020    BASOSABS 0.04 10/12/2020     CMP:    Lab Results   Component Value Date     10/12/2020    K 3.9 10/12/2020    K 3.5 10/08/2020     10/12/2020    CO2 18 10/12/2020    BUN 3 10/12/2020    CREATININE 0.4 10/12/2020    GFRAA >60 10/12/2020    LABGLOM >60 10/12/2020    GLUCOSE 67 10/12/2020    GLUCOSE 125 05/11/2012    PROT 6.0 10/07/2020    LABALBU 2.6 10/07/2020 LABALBU 4.8 05/11/2012    CALCIUM 5.8 10/12/2020    BILITOT <0.2 10/07/2020    ALKPHOS 171 10/07/2020    AST 12 10/07/2020    ALT 13 10/07/2020          Assessment and Plan     1. COVID 19+  - COVID-19 positive on 10/9  - Patient is not in acute respiratory distress and is currently asymptomatic  - Afebrile since admission  - Currently on room air, saturating well 97-98%  - Continue to monitor respiratory status  - Continue isolation      2.  Left lower extremity DVT s/p EKOS on 10/8/2020  As per Vascular when  On jay jay Bentley MD,[facility]  Pulmonary&Critical Care Medicine   Director of [address] [place] [address] Director of [address] [place] [address]    Bobby Mojica

## 2020-10-13 NOTE — CARE COORDINATION
SOCIAL WORK/DISCHARGE PLANNING;  Spoke with [address], MrVinny Jefry Jaime. Insurance pre-cert was already started as discussed yesterday. Will await approval from insurance.   Meryle [facility]  [phone]

## 2020-10-14 LAB
ANION GAP SERPL CALCULATED.3IONS-SCNC: 14 MMOL/L (ref 7–16)
BUN BLDV-MCNC: 4 MG/DL (ref 6–20)
CALCIUM SERPL-MCNC: 8.1 MG/DL (ref 8.6–10.2)
CHLORIDE BLD-SCNC: 102 MMOL/L (ref 98–107)
CO2: 21 MMOL/L (ref 22–29)
CREAT SERPL-MCNC: 0.6 MG/DL (ref 0.7–1.2)
GFR AFRICAN AMERICAN: >60
GFR NON-AFRICAN AMERICAN: >60 ML/MIN/1.73
GLUCOSE BLD-MCNC: 121 MG/DL (ref 74–99)
HCT VFR BLD CALC: 27.8 % (ref 37–54)
HCT VFR BLD CALC: 28.3 % (ref 37–54)
HCT VFR BLD CALC: 28.3 % (ref 37–54)
HCT VFR BLD CALC: 30.9 % (ref 37–54)
HEMOGLOBIN: 8.7 G/DL (ref 12.5–16.5)
HEMOGLOBIN: 8.7 G/DL (ref 12.5–16.5)
HEMOGLOBIN: 8.8 G/DL (ref 12.5–16.5)
HEMOGLOBIN: 9.6 G/DL (ref 12.5–16.5)
MCH RBC QN AUTO: 27.4 PG (ref 26–35)
MCHC RBC AUTO-ENTMCNC: 30.7 % (ref 32–34.5)
MCV RBC AUTO: 89 FL (ref 80–99.9)
METER GLUCOSE: 100 MG/DL (ref 74–99)
METER GLUCOSE: 135 MG/DL (ref 74–99)
METER GLUCOSE: 138 MG/DL (ref 74–99)
METER GLUCOSE: 90 MG/DL (ref 74–99)
PDW BLD-RTO: 17.9 FL (ref 11.5–15)
PLATELET # BLD: 612 E9/L (ref 130–450)
PMV BLD AUTO: 8.7 FL (ref 7–12)
POTASSIUM SERPL-SCNC: 4.1 MMOL/L (ref 3.5–5)
RBC # BLD: 3.18 E12/L (ref 3.8–5.8)
SODIUM BLD-SCNC: 137 MMOL/L (ref 132–146)
WBC # BLD: 6.6 E9/L (ref 4.5–11.5)

## 2020-10-14 PROCEDURE — 6370000000 HC RX 637 (ALT 250 FOR IP): Performed by: NURSE PRACTITIONER

## 2020-10-14 PROCEDURE — 80048 BASIC METABOLIC PNL TOTAL CA: CPT

## 2020-10-14 PROCEDURE — 85014 HEMATOCRIT: CPT

## 2020-10-14 PROCEDURE — 2140000000 HC CCU INTERMEDIATE R&B

## 2020-10-14 PROCEDURE — 6370000000 HC RX 637 (ALT 250 FOR IP): Performed by: SURGERY

## 2020-10-14 PROCEDURE — 99231 SBSQ HOSP IP/OBS SF/LOW 25: CPT | Performed by: INTERNAL MEDICINE

## 2020-10-14 PROCEDURE — 85018 HEMOGLOBIN: CPT

## 2020-10-14 PROCEDURE — 82962 GLUCOSE BLOOD TEST: CPT

## 2020-10-14 PROCEDURE — 99233 SBSQ HOSP IP/OBS HIGH 50: CPT | Performed by: INTERNAL MEDICINE

## 2020-10-14 PROCEDURE — 85027 COMPLETE CBC AUTOMATED: CPT

## 2020-10-14 PROCEDURE — 2580000003 HC RX 258: Performed by: SURGERY

## 2020-10-14 PROCEDURE — 36415 COLL VENOUS BLD VENIPUNCTURE: CPT

## 2020-10-14 RX ADMIN — CYCLOBENZAPRINE 10 MG: 10 TABLET, FILM COATED ORAL at 15:04

## 2020-10-14 RX ADMIN — CHOLESTYRAMINE 4 G: 4 POWDER, FOR SUSPENSION ORAL at 21:19

## 2020-10-14 RX ADMIN — PRAVASTATIN SODIUM 20 MG: 20 TABLET ORAL at 21:19

## 2020-10-14 RX ADMIN — CYCLOBENZAPRINE 10 MG: 10 TABLET, FILM COATED ORAL at 21:18

## 2020-10-14 RX ADMIN — APIXABAN 5 MG: 5 TABLET, FILM COATED ORAL at 21:19

## 2020-10-14 RX ADMIN — POTASSIUM CHLORIDE 20 MEQ: 1500 TABLET, EXTENDED RELEASE ORAL at 08:53

## 2020-10-14 RX ADMIN — POTASSIUM CHLORIDE 20 MEQ: 1500 TABLET, EXTENDED RELEASE ORAL at 16:46

## 2020-10-14 RX ADMIN — HYDROXYUREA 500 MG: 500 CAPSULE ORAL at 08:53

## 2020-10-14 RX ADMIN — HYDROXYUREA 500 MG: 500 CAPSULE ORAL at 21:27

## 2020-10-14 RX ADMIN — FENOFIBRATE 54 MG: 54 TABLET ORAL at 08:53

## 2020-10-14 RX ADMIN — CYCLOBENZAPRINE 10 MG: 10 TABLET, FILM COATED ORAL at 08:53

## 2020-10-14 RX ADMIN — CHOLESTYRAMINE 4 G: 4 POWDER, FOR SUSPENSION ORAL at 08:53

## 2020-10-14 RX ADMIN — DULOXETINE HYDROCHLORIDE 30 MG: 30 CAPSULE, DELAYED RELEASE ORAL at 08:52

## 2020-10-14 RX ADMIN — OXYCODONE HYDROCHLORIDE 10 MG: 10 TABLET ORAL at 02:48

## 2020-10-14 RX ADMIN — METOPROLOL TARTRATE 25 MG: 25 TABLET, FILM COATED ORAL at 08:53

## 2020-10-14 RX ADMIN — Medication 10 ML: at 21:26

## 2020-10-14 RX ADMIN — OXYCODONE 5 MG: 5 TABLET ORAL at 21:19

## 2020-10-14 RX ADMIN — INSULIN GLARGINE 25 UNITS: 100 INJECTION, SOLUTION SUBCUTANEOUS at 06:00

## 2020-10-14 RX ADMIN — FERROUS SULFATE TAB 325 MG (65 MG ELEMENTAL FE) 325 MG: 325 (65 FE) TAB at 08:53

## 2020-10-14 RX ADMIN — APIXABAN 5 MG: 5 TABLET, FILM COATED ORAL at 08:53

## 2020-10-14 RX ADMIN — PANTOPRAZOLE SODIUM 40 MG: 40 TABLET, DELAYED RELEASE ORAL at 08:53

## 2020-10-14 RX ADMIN — OXYCODONE 5 MG: 5 TABLET ORAL at 08:53

## 2020-10-14 RX ADMIN — OXYCODONE 5 MG: 5 TABLET ORAL at 15:11

## 2020-10-14 RX ADMIN — Medication 10 ML: at 08:52

## 2020-10-14 RX ADMIN — METOPROLOL TARTRATE 25 MG: 25 TABLET, FILM COATED ORAL at 21:19

## 2020-10-14 RX ADMIN — FERROUS SULFATE TAB 325 MG (65 MG ELEMENTAL FE) 325 MG: 325 (65 FE) TAB at 21:19

## 2020-10-14 ASSESSMENT — PAIN DESCRIPTION - FREQUENCY
FREQUENCY: CONTINUOUS
FREQUENCY: CONTINUOUS

## 2020-10-14 ASSESSMENT — PAIN DESCRIPTION - LOCATION
LOCATION: LEG
LOCATION: LEG

## 2020-10-14 ASSESSMENT — PAIN DESCRIPTION - DESCRIPTORS
DESCRIPTORS: ACHING;CONSTANT;DISCOMFORT
DESCRIPTORS: DISCOMFORT

## 2020-10-14 ASSESSMENT — PAIN DESCRIPTION - ORIENTATION
ORIENTATION: LEFT
ORIENTATION: LEFT

## 2020-10-14 ASSESSMENT — PAIN SCALES - GENERAL
PAINLEVEL_OUTOF10: 10
PAINLEVEL_OUTOF10: 0
PAINLEVEL_OUTOF10: 9
PAINLEVEL_OUTOF10: 9
PAINLEVEL_OUTOF10: 0
PAINLEVEL_OUTOF10: 10

## 2020-10-14 ASSESSMENT — PAIN DESCRIPTION - PROGRESSION
CLINICAL_PROGRESSION: GRADUALLY WORSENING
CLINICAL_PROGRESSION: NOT CHANGED

## 2020-10-14 ASSESSMENT — PAIN DESCRIPTION - PAIN TYPE
TYPE: ACUTE PAIN
TYPE: ACUTE PAIN

## 2020-10-14 ASSESSMENT — PAIN - FUNCTIONAL ASSESSMENT: PAIN_FUNCTIONAL_ASSESSMENT: PREVENTS OR INTERFERES SOME ACTIVE ACTIVITIES AND ADLS

## 2020-10-14 ASSESSMENT — PAIN SCALES - WONG BAKER: WONGBAKER_NUMERICALRESPONSE: 0

## 2020-10-14 NOTE — CARE COORDINATION
SOCIAL WORK/DISCHARGE PLANNING;  Called [facility] again. Spoke with , Vinny Jameson Neri and admission liaison, Jocelyn. Still waiting for insurance pre-cert. Per Kayce Ferreira, she checked with [employer] today pre-cert still pending. With his insurance plan they can not take until pre-cert comes back. I asked her to call the unit if it comes back this evening.   [facility] [place]  [phone]

## 2020-10-14 NOTE — PROGRESS NOTES
Hospitalist Progress Note  [facility]       Patient: Mel Oliveira  Unit/Bed: 9364/7579-O  YOB: 1975  MRN: [id number]  Acct: [de-identified]   AdmittingDiagnosis: DVT (deep vein thrombosis) in pregnancy [O22.30]  DVT (deep vein thrombosis) in pregnancy [O22.30]  Deep vein thrombosis (DVT) present on admission [facility]) [I82.409]  Deep vein thrombosis (DVT) present on admission [facility]) [I82.409]  Admit Date: 10/7/2020  Hospital Day: 7    Subjective:    Patient is having problems with leg pain and swelling    Patient Seen, Chart, Labs, Radiology studies, and Consults reviewed. Objective:   BP (!) 136/52   Pulse 113   Temp 97.5 °F (36.4 °C) (Oral)   Resp 16   Ht 6' 1\" (1.854 m)   Wt 262 lb (118.8 kg)   SpO2 95%   BMI 34.57 kg/m²       Intake/Output Summary (Last 24 hours) at 10/14/2020 1354  Last data filed at 10/14/2020 0600  Gross per 24 hour   Intake 360 ml   Output 500 ml   Net -140 ml       Physical Exam  General: AAO to person, place, time, in no acute distress, bulging; moderate habitus; BMI 34  Head and neck : PERRLA, EOMI . Sclera non icteric. Oropharynx : Clear  Neck: no JVD,  no adenopathy. Heart: Regular rate and regular rhythm, no murmur  Lungs: Clear to auscultation   Extremities: +LLE edema: Improved left calf is approximately 1 inch in diameter greater than right calf  Abdomen: Soft, non-tender; +surgical scars: Incision well-healed supra umbilicus dime size opening  Skin: coccyx wound, abdominal wound covered dressing  Neurologic:Cranial nerves grossly intact.  No focal motor or sensory deficits .     [place]:No  Drains:No  Central Line/port:No   EKG:Yes     Imaging:Yes   Ct Abdomen Pelvis W Iv Contrast Additional Contrast? None    Result Date: 2020  Patient MRN:  [id number] : 1975 Age: 53 years Gender: Male Order Date:  2020 4:51 PM EXAM: CT ABDOMEN PELVIS W IV CONTRAST NUMBER OF IMAGES \ views:  414 INDICATION: Dizziness, abdominal pain COMPARISON: Contemporaneous CTA chest study was performed, CT abdomen pelvis with contrast July 31, 8659 TECHNIQUE: Helical imaging was extended from the lower chest to the lower pelvis in conjunction with the intravenous administration of 90 mL of Isovue-370, and axial images were supplemented with sagittal and coronal MPR images. Low-dose CT  acquisition technique included one of following options; 1 . Automated exposure control, 2. Adjustment of MA and or KV according to patient's size or 3. Use of iterative reconstruction. FINDINGS: CHEST: Contemporaneous CTA imaging documented bilateral pulmonary emboli without right heart strain or acute pulmonary or pleural complications. LIVER: The liver is uniform in parenchymal density, and no focal lesions are identified. There is diffuse fatty change. GALLBLADDER AND BILIARY TREE: There is a pigtail drainage catheter in the gallbladder fossa, and it is uncertain if this is a post cholecystectomy drain or if this is a cholecystostomy associated with complete collapse of the gallbladder. There is no biliary ductal ectasia. SPLEEN: A circumscribed hypodense structure in the splenic bed could be a hypodense or infarcted spleen, but shows no evidence of extracapsular fluid or inflammatory change. The finding measures about 10 cm length by about 7 cm in thickness. It is uncertain if this represents residual following splenectomy, but there is no mention in the electronic record of splenectomy. . ADRENALS:  The adrenals are normal in appearance bilaterally. PANCREAS:The pancreas shows no evidence of acute inflammation or mass lesions. KIDNEYS/BLADDER: The kidneys show uniform cortical enhancement and no evidence of outflow obstruction. There is no perinephric inflammatory change. Ureters are similar in course and caliber, and the bladder is normal in appearance. APPENDIX:  Normal BOWEL:  There is no evidence of inflammation, obstruction, or perforation of enteric structures.  There is some fluid filled distal ileum, and the previously identified thickening of jejunal loops in the left upper abdomen is again documented. The appearance suggests enteritis with focal stenosis in the anterior right paramedian mid abdomen suggesting a focal stricture. There is no evidence of fistula formation. Crohn's disease would be a differential diagnostic consideration. PELVIS:  There is no pelvic adenopathy or dependent free pelvic fluid. Ricardo Founds LYMPHATICS: Numerous small lymph nodes are clustered about the small bowel mesenteric root, and there are a few borderline prominent lymph nodes beneath the renal vascular pedicles. VASCULAR: There is no evidence of acute vascular pathology involving mesenteric or retroperitoneal great vessels. There is an inferior vena cava filter. SKELETAL: Mild levoscoliotic curvature and degenerative changes of the lumbar spine are noted. There are bilateral hip arthroplasty elements without acute complications. There is persistent small bowel mural thickening in the midabdomen involving the jejunum and proximal to mid ileum, with an apparent stenosis or focal stricture at about the level of the renal vascular pedicles and inferior vena cava filter in the right paramedian anterior abdomen. There is no evidence of perforation or obstruction, however. There is a pigtail drainage catheter in the gallbladder fossa, which could be a cholecystostomy catheter or a drainage catheter in the gallbladder fossa following cholecystectomy. The gallbladder is present, it is completely collapsed. A cystic appearing structure is noted in the splenic bed, and could be a splenic remnant or a low-density spleen, not prominently enlarged.      Xr Chest Portable    Result Date: 10/10/2020  EXAMINATION: ONE XRAY VIEW OF THE CHEST 10/10/2020 11:52 am COMPARISON: 09/17/2020 HISTORY: ORDERING SYSTEM PROVIDED HISTORY: pna TECHNOLOGIST PROVIDED HISTORY: Reason for exam:->pna What reading provider will be dictating this exam?->CRC FINDINGS: The lungs are without acute focal process. There is no effusion or pneumothorax. The cardiomediastinal silhouette is without acute process. The osseous structures are without acute process. No acute process. Xr Chest Portable    Result Date: 2020  Patient MRN:  [id number] : 1975 Age: 53 years Gender: Male Order Date:  2020 11:58 PM TECHNIQUE/NUMBER OF IMAGES/COMPARISON/CLINICAL HISTORY: Chest AP 1 image one view Comparison 10/17/2020. Central venous line placed. FINDINGS: Right internal jugular central venous catheter tip in SVC. There is no pneumothorax on the right breast. Lungs are clear. Heart and small size, mediastinum unremarkable. There is no perihilar vascular congestion. No acute cardiopulmonary process. Xr Chest Portable    Result Date: 2020  Single frontal projection (one view) of the chest. COMPARISON: 2020 FINDINGS: The heart, lungs, mediastinum and regional skeleton are unremarkable. The costophrenic angles are sharp and clear. There is no evidence of mediastinal shift. The heart is not enlarged. There is no evidence of hilar adenopathy. Lungs are negative for evidence of acute airspace consolidation, effusion, atelectasis, pneumothorax, pathologic nodularity or interstitial thickening. Regional bone density and trabecular pattern are normal.     Negative one view chest.    Cta Pulmonary W Contrast    Result Date: 10/7/2020  EXAMINATION: CTA OF THE CHEST 10/7/2020 2:48 pm TECHNIQUE: CTA of the chest was performed after the administration of intravenous contrast.  Multiplanar reformatted images are provided for review. MIP images are provided for review. Dose modulation, iterative reconstruction, and/or weight based adjustment of the mA/kV was utilized to reduce the radiation dose to as low as reasonably achievable.  COMPARISON: 2020 HISTORY: ORDERING SYSTEM PROVIDED HISTORY: DVT, tachy, r/o PE TECHNOLOGIST PROVIDED HISTORY: Reason for exam:->DVT, tachy, r/o PE What reading provider will be dictating this exam?->CRC FINDINGS: There is a filling defect present within posterior right lower lobe segmental pulmonary artery. Subtle filling defect is present within lingular segmental pulmonary artery as seen on axial image number 125. No evidence of saddle embolism. The heart is normal in size. No pericardial effusion. There is no mediastinal or hilar lymphadenopathy. There are minimal areas of subsegmental atelectasis in lung bases. There is minimal new left pleural effusion. No evidence of pneumonia. There is no pneumothorax. View of the upper abdomen shows normal bilateral adrenal glands. Postsurgical changes with areas of subcutaneous emphysema are associated with subxiphoid anterior abdominal wall. Postsurgical changes are also present within upper abdomen below margin of the liver. 1.  Small filling defect is located within right lower lobe segmental pulmonary artery and small filling defect is present within lingular segmental pulmonary artery. These findings are related to chronic bilateral pulmonary emboli. [place] of pulmonary embolus has decreased compared to prior from 2020. 2.  New minimal left pleural effusion. 3. Foci of gas are associated with the subxiphoid abdominal wall as well as areas of inflammation located in right upper abdomen below margin of liver. Clinical correlation recommended for recent surgery. Critical results were called by Dr. Ester Johnson to JOHN Gentile on 10/7/2020 at 15:06. Cta Pulmonary W Contrast    Result Date: 2020  Patient MRN:  [id number] : 1975 Age: 53 years Gender: Male Order Date:  2020 4:51 PM EXAM: CTA PULMONARY W CONTRAST NUMBER OF IMAGES:  480 INDICATION: Dizziness, cholecystostomy drain COMPARISON: None Technique: Low-dose CT  acquisition technique included one of following options; 1 . Automated exposure control, 2.  Adjustment of MA and or KV according to patient's size or 3. Use of iterative reconstruction. Contiguous spiral images were obtained in the axial plane, following the administration of 90 mL of Isovue-370 intravenous contrast using CT angiographic protocol. Sagittal and coronal images were reconstructed from the axial plane acquisition. Addition MIP reconstructions were presented to aid in the interpretation of this study. Findings: The central pulmonary arterial tree shows acute emboli in the descending pulmonary arteries bilaterally, but no areas of regional oligemia, pleural effusion, or peripheral infarction are identified. There is no evidence of right heart strain. Lung window images show no evidence of organized pneumonia or mass lesions. Soft tissue window images show no evidence of mediastinal adenopathy, and the thoracic aorta shows no acute complications. Bone window images show no acute chest wall traumatic findings. Upper abdominal images show no evidence of acute visceral pathology. Diffuse fatty change of the liver is noted. The patient appears to be post cholecystectomy with a pigtail drain in the gallbladder fossa. The adrenals, kidneys, and included bowel structures are unremarkable for some mural thickening of small bowel in the left upper abdomen that could indicate mild colitis. Diverticuli are noted in the left hemicolon. Bilateral central and dependent pulmonary emboli without peripheral infarcts No evidence of pneumonia, pulmonary mass lesions, or cardiac decompensation. Fatty change of the liver, and there is a pigtail drainage catheter in the gallbladder fossa. Upper abdominal bowel loops show some mural thickening, which is nonspecific, but could indicate enteritis. ALERT:  THIS IS AN ABNORMAL REPORT-bilateral lower lobe central acute pulmonary emboli without complicating right heart strain, pleural effusion, or pulmonary infarcts.  Note: The emergency department was contacted telephonically by myself at the time of this dictation communicate findings    Us Dup Lower Extremity Left Jorge    Result Date: 10/7/2020  Patient MRN:  [id number] : 1975 Age: 53 years Gender: Male Order Date:  10/7/2020 2:16 PM EXAM: US DUP LOWER EXTREMITY LEFT JORGE NUMBER OF IMAGES:  25 INDICATION:  leg pain, swelling, r/o DVT leg pain, swelling, r/o DVT What reading provider will be dictating this exam?->[facility] COMPARISON: None There is evidence for deep venous thrombosis, which is occlusive in the left iliac common femoral and profunda superficial femoral and tibial peroneal trunk and the anterior and posterior tibial veins There is otherwise good compressibility, there is good augmentation, there is good color flow. There is evidence for a large deep venous thrombosis involving the entire left leg from the left iliac to the level of the trifurcation trifurcation veins below the knee ALERT:  THIS IS AN ABNORMAL REPORT     Xr Chest Abdomen Ng Placement    Result Date: 2020  Patient MRN: [id number] : 1975 Age:  53 years Gender: Male Order Date: 2020 5:35 AM Exam: XR CHEST ABDOMEN NG PLACEMENT Number of Images: 1 view Indication:   NG placement NG placement Comparison: Prior study from 2020 is available Findings: Study demonstrate lower chest upper abdomen demonstrated the nasogastric tube to be in satisfactory position with the tip in the body of the stomach. There is an IVC filter noted to be in place. There is a right internal jugular catheter with tip in the superior vena cava. The abdomen gas pattern is nonspecific. Nasogastric tube in satisfactory position Other findings as described above     Fluoro For Surgical Procedures    Result Date: 10/12/2020  EXAMINATION: SPOT FLUOROSCOPIC IMAGES 10/11/2020 1:16 pm TECHNIQUE: Fluoroscopy was provided by the radiology department for procedure. Radiologist was not present during examination.  FLUOROSCOPY DOSE AND TYPE OR TIME AND EXPOSURES: 107.9 Splenic infarction    Other pulmonary embolism without acute cor pulmonale (HCC)    Thrombocytosis (HCC)    Acute blood loss anemia    Skin wound from surgical incision    Other hyperlipidemia    COVID-19 virus infection  Resolved Problems:    * No resolved hospital problems. *    Summary: Mr. Jessica Ceron, a 52 yo man was admitted 10/7/2020 for left leg swelling. 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 Pt was discharged to nursing home on lovenox. He presented from SNF for L leg swelling. In ED he was found to have DVT - started on heparin drip and admitted for further evaluation and treatment.   Consultation was placed to vascular surgery, hematology and surgery. He underwent ECMO lysis catheter placement and is getting TPA and heparin infusion and plan is for angiogram [10/13]. COVID-19 positive [10/9/2020]    - [10/14]  . # DVT/HxPE/COVID: On apixaban [10/11]. COVID-19 positive [10/9/2020]. RA sat 94-97%. - Continue apixaban,  . # DM/HLD: POC gluc 121-138. Good control.   - Continue insulin glargine 25 units/d, lispro insulin 0-6 units subcu TID WC and 0-3 units hs; Pravastatin 20 mg hs, fenofibrate 54 mg/d  . # HTN: -136/52-78.   - Continue metoprolol titrate 25 mg bid  . # Thrombocytosis/An: Ptt 368-> 550->611. Hbg 9.8-> 10.6-> 8.7.    Continue hydroxyurea and ferrous sulfate    CODE STATUS: Full    Time Spent: 45 minutes  signed by Gabriella Baker MD on 10/14/2020 at 1:54 PM    [facility]

## 2020-10-14 NOTE — PLAN OF CARE
Problem: Skin Integrity:  Goal: Will show no infection signs and symptoms  Description: Will show no infection signs and symptoms  Outcome: Met This Shift  Goal: Absence of new skin breakdown  Description: Absence of new skin breakdown  Outcome: Met This Shift     Problem: Falls - Risk of:  Goal: Will remain free from falls  Description: Will remain free from falls  Outcome: Met This Shift  Goal: Absence of physical injury  Description: Absence of physical injury  Outcome: Met This Shift     Problem: Pain:  Goal: Pain level will decrease  Description: Pain level will decrease  Outcome: Met This Shift  Goal: Control of acute pain  Description: Control of acute pain  Outcome: Met This Shift  Goal: Control of chronic pain  Description: Control of chronic pain  Outcome: Met This Shift     Problem: Airway Clearance - Ineffective  Goal: Achieve or maintain patent airway  Outcome: Met This Shift     Problem: Gas Exchange - Impaired  Goal: Absence of hypoxia  Outcome: Met This Shift  Goal: Promote optimal lung function  Outcome: Met This Shift     Problem: Breathing Pattern - Ineffective  Goal: Ability to achieve and maintain a regular respiratory rate  Outcome: Met This Shift     Problem:  Body Temperature -  Risk of, Imbalanced  Goal: Ability to maintain a body temperature within defined limits  Outcome: Met This Shift  Goal: Will regain or maintain usual level of consciousness  Outcome: Met This Shift  Goal: Complications related to the disease process, condition or treatment will be avoided or minimized  Outcome: Met This Shift     Problem: Isolation Precautions - Risk of Spread of Infection  Goal: Prevent transmission of infection  Outcome: Met This Shift     Problem: Nutrition Deficits  Goal: Optimize nutrtional status  Outcome: Met This Shift     Problem: Risk for Fluid Volume Deficit  Goal: Maintain normal heart rhythm  Outcome: Met This Shift  Goal: Maintain absence of muscle cramping  Outcome: Met This Shift  Goal: Maintain normal serum potassium, sodium, calcium, phosphorus, and pH  Outcome: Met This Shift     Problem: Loneliness or Risk for Loneliness  Goal: Demonstrate positive use of time alone when socialization is not possible  Outcome: Met This Shift     Problem: Fatigue  Goal: Verbalize increase energy and improved vitality  Outcome: Met This Shift     Problem: Patient Education: Go to Patient Education Activity  Goal: Patient/Family Education  Outcome: Met This Shift     Problem: Increased nutrient needs (NI-5.1)  Goal: Food and/or Nutrient Delivery  Description: Individualized approach for food/nutrient provision.   10/14/2020 1010 by Jailyn Sims RD, LD  Outcome: Met This Shift

## 2020-10-14 NOTE — PROGRESS NOTES
Pulmonary [address] [facility]                             Pulmonary Consult/Progress Note :  Follow covid    Subjective     Doing very well  No SOB   On RA   No chest pain       Objective     VS: /78   Pulse 87   Temp 98.2 °F (36.8 °C) (Oral)   Resp 16   Ht 6' 1\" (1.854 m)   Wt 262 lb (118.8 kg)   SpO2 99%   BMI 34.57 kg/m²           I & O - 24hr:     Intake/Output Summary (Last 24 hours) at 10/13/2020 2258  Last data filed at 10/13/2020 2247  Gross per 24 hour   Intake 560 ml   Output 0 ml   Net 560 ml       Physical Exam:      Physical Exam:     General: AAO to person, place, time, in no acute distress,   Head and neck : PERRLA, EOMI . Sclera non icteric. Oropharynx : Clear  Neck: no JVD,  no adenopathy. Heart: Regular rate and regular rhythm, no murmur  Lungs: Clear to auscultation   Extremities: +LLE edema  Abdomen: Soft, non-tender; +surgical scars  Skin: coccyx wound, abdominal wound covered dressing  Neurologic:Cranial nerves grossly intact.  No focal motor or sensory deficits .       Labs     CBC with Differential:    Lab Results   Component Value Date    WBC 7.5 10/13/2020    RBC 3.40 10/13/2020    HGB 10.6 10/13/2020    HCT 35.7 10/13/2020     10/13/2020    MCV 90.0 10/13/2020    MCH 27.9 10/13/2020    MCHC 31.0 10/13/2020    RDW 17.7 10/13/2020    NRBC 0.9 08/01/2020    SEGSPCT 80 11/26/2013    LYMPHOPCT 13.6 10/12/2020    MONOPCT 14.9 10/12/2020    BASOPCT 0.5 10/12/2020    MONOSABS 1.32 10/12/2020    LYMPHSABS 1.21 10/12/2020    EOSABS 0.37 10/12/2020    BASOSABS 0.04 10/12/2020     CMP:    Lab Results   Component Value Date     10/13/2020    K 3.7 10/13/2020    K 3.5 10/08/2020     10/13/2020    CO2 26 10/13/2020    BUN 4 10/13/2020    CREATININE 0.6 10/13/2020    GFRAA >60 10/13/2020    LABGLOM >60 10/13/2020    GLUCOSE 101 10/13/2020    GLUCOSE 125 05/11/2012    PROT 6.0 10/07/2020    LABALBU 2.6 10/07/2020    LABALBU 4.8 05/11/2012    CALCIUM 8.5 10/13/2020    BILITOT <0.2 10/07/2020    ALKPHOS 171 10/07/2020    AST 12 10/07/2020    ALT 13 10/07/2020          Assessment and Plan     1. COVID 19+  - COVID-19 positive on 10/9  - Patient is not in acute respiratory distress and is currently asymptomatic  - Afebrile since admission  - Currently on room air, saturating well 97-98%  - Continue to monitor respiratory status  - Continue isolation      2.  Left lower extremity DVT s/p EKOS on 10/8/2020  As per Vascular when  On jay jay Bentley MD,[facility]  Pulmonary&Critical Care Medicine   Director of [facility] Director of [facility]    Nusrat Arreaga

## 2020-10-14 NOTE — ADT AUTH CERT
Patient Demographics     Name  Patient ID  SSN  Gender Identity  Birth Date    Calos Lorenzana  [id number]    Male  [birth year] (44 yrs)    Address  Phone  Email  Employer     [address] [facility] [facility] [address] [facility]  [phone] (Y)   [phone] (P)  --  NOT EMPLOYED   [place]   [address]  [place]  Occupation  Emp Status     [address] [facility]  --  Not Employed     Reg Status  PCP  Date Last Verified  Next Review Date     Verified  Carmen  [address]  [phone]  10/01/20  10/31/20     Admission Date  Discharge Date  Admitting Provider      10/07/20  --  Arlene Cuellar MD      Marital Status  Religious         Buddhism       Emergency Contact 1  Emergency Contact 2  Emergency Contact 3  Emergency Contact 4    Taye Mortonbourne [phone] Oliva Jackson (C)   21 [phone] (B)   [phone] Reji Mayorga)  Lise Mora 722 200 110 Oliva Sampson)   [phone] Reji Ha (3)   [phone] Oliva Sampson)    Subscriber Details   Hospital Account [de-identified]   [address] Name/Sex/Relation  Subscriber   Subscriber Address/Phone  Subscriber Emp/Emp Phone    1.  Ryan Garza   [id number]  Tootie Guzmán - Male   (Self)  1975  [address] [place] [address]  [id number]   [phone](W)  NOT EMPLOYED   [phone]    Utilization Reviews         Deep Venous Thrombosis of Lower Extremities - Care Day 6 (10/12/2020) by Rony Stewart RN         Review Status  Review Entered    Completed  10/14/2020 10:08        Criteria Review       Care Day: 6 Care Date: 10/12/2020 Level of Care: Intermediate Care    Guideline Day 2    Level Of Care    (X) Floor    10/14/2020 10:08 AM EDT by Regi Coreas      intermediate care unit    Clinical Status    (X) * Hypotension absent    10/14/2020 10:08 AM EDT by Regi Coreas      128/72    (X) * PTT not indicated or in therapeutic range    (X) * No evidence of bleeding or embolism    Routes    (X) Usual diet    (X) Oral hydration and medications Medications    (X) Anticoagulants [G]    10/14/2020 10:08 AM EDT by Sam goyal    * Milestone    Additional Notes    10/12  Day 6       VS- /72   Pulse 120   Temp 98.7 °F (37.1 °C) (Oral)   Resp 18   Ht 6' 1\" (1.854 m)   Wt 262 lb (118.8 kg)   SpO2 100% RA       MEDS    Ca gluconate 1gm ivpb x1    Dilauidi 0.5mg ivp x3 doses    Oxycodone 10mg po x3    Percocet 1 tab x2    potassium chloride 20 mEq Oral BID WC    · apixaban 5 mg Oral BID    · magnesium sulfate 2 g Intravenous Once    · sodium chloride flush 10 mL Intravenous 2 times per day    · cholestyramine 1 packet Oral BID    · cyclobenzaprine 10 mg Oral TID    · DULoxetine 30 mg Oral Daily    · fenofibrate 54 mg Oral Daily    · ferrous sulfate 325 mg Oral BID    · fluticasone 2 spray Nasal Daily    · hydroxyurea 500 mg Oral BID    · insulin glargine 25 Units Subcutaneous Daily    · metoprolol tartrate 25 mg Oral BID    · pantoprazole 40 mg Oral Daily    · pravastatin 20 mg Oral Nightly    · insulin lispro 0-6 Units Subcutaneous TID WC    · insulin lispro 0-3 Units Subcutaneous Nightly       Vascular note    Pt s/e.  Continues to complain of pain in his left ankle, posterior knee, and groin. It is worse with standing. Swelling has improved. Denies shortness of breath or chest pain. Transfused pRBC yesterday for 6.3, H/H 7.1 today. Denies bleeding issues. A/P L LE DVT lysis, left external iliac and common femoral vein stenting    · L LE swelling improved    · Continue AC with eliquis    Decrease in H/H today, will repeat labs this afternoon    ·            Monitor H/H, No obvious signs of bleeding. ·            Transfuse as needed    · Transition to oral pain medication.  IV dilaudid stopped, increased to oxy IR 5mg tab 1-2 every 4 hours    · Encouraged pt to sit up in chair and ambulate as able       Pulmonary consult    Assessment and Plan         1. COVID 19+    - COVID-19 positive on 10/9    - Patient is not in acute respiratory distress and is currently asymptomatic    - Afebrile since admission    - Currently on room air, saturating well 97-98%    - Continue to monitor respiratory status    - Continue isolation          2. Left lower extremity DVT s/p EKOS on 10/8/2020    As per Vascular when    On eliquis           Deep Venous Thrombosis of Lower Extremities - Care Day 5 (10/11/2020) by Heri Soler RN         Review Status  Review Entered    Completed  10/12/2020 16:57        Criteria Review       Care Day: 5 Care Date: 10/11/2020 Level of Care: Intermediate Care    Guideline Day 2    Clinical Status    (X) * Hypotension absent    10/12/2020 4:57 PM EDT by Berny Solis      107/66    ( ) * PTT not indicated or in therapeutic range    (X) * No evidence of bleeding or embolism    Routes    (X) Usual diet    (X) Oral hydration and medications    Medications    (X) Anticoagulants [G]    * Milestone    Additional Notes    10/11  Day 5       VS-    VS: /70   Pulse 108   Temp 98.5 °F (36.9 °C)   Resp (!) 38   Ht 6' 1\" (1.854 m)   Wt 264 lb 15.9 oz (120.2 kg)   SpO2 92%        Hgb was 6.3 today, transfuse 1 unit PRBC.        MEDS-    alteplase (ACTIVASE) 10 mg in 0.9% sodium chloride infusion 100 mL 1 mg/hr    · sodium chloride 35 mL/hr    potassium chloride 20 mEq Oral BID WC    · apixaban 5 mg Oral BID    · propofol        · magnesium sulfate 2 g Intravenous Once    · sodium chloride flush 10 mL Intravenous 2 times per day    · ceFAZolin (ANCEF) IVPB 2 g Intravenous Q8H    · cholestyramine 1 packet Oral BID    · cyclobenzaprine 10 mg Oral TID    · DULoxetine 30 mg Oral Daily    · fenofibrate 54 mg Oral Daily    · ferrous sulfate 325 mg Oral BID    · fluticasone 2 spray Nasal Daily    · hydroxyurea 500 mg Oral BID    · insulin glargine 25 Units Subcutaneous Daily    · metoprolol tartrate 25 mg Oral BID    · pantoprazole 40 mg Oral Daily    · pravastatin 20 mg Oral Nightly    · insulin lispro 0-6 Units Subcutaneous TID WC    · insulin lispro 0-3 Units Subcutaneous Nightly       **Internal MD note**    PLAN:              1.  Patient to maintain on Eliquis    2.  Continue to watch other vitals and electrolytes    3.  Maintain Cymbalta    4.  Maintain glycemic control    5.  Monitor hemoglobin and transfuse appropriately    6.  Wound care following for decubiti    7.  Hyperlipidemia managed with statin    8.  History of splenectomy and omentectomy    9.  Hydroxyurea for thrombocytosis    10.  Cholestyramine for diarrhea            **OP note**    Pre-procedure Diagnosis: Left lower extremity DVT, ivc thrombosis    Post-procedure Diagnosis: Same,    Procedure:      54790 FU  Venogram of IVC and Left lower extremity with existing lysis catheter    Removal of lysis catheter    EKOS catheter     Plan    Start eliquis    bedrest              Deep Venous Thrombosis of Lower Extremities - Care Day 4 (10/10/2020) by Samuel Maynard RN         Review Status  Review Entered    Completed  10/12/2020 16:51        Criteria Review       Care Day: 4 Care Date: 10/10/2020 Level of Care: Intermediate Care    Guideline Day 2    Clinical Status    (X) * Hypotension absent    10/12/2020 4:51 PM EDT by Cony Ledezma      /77    ( ) * PTT not indicated or in therapeutic range    (X) * No evidence of bleeding or embolism    Routes    (X) Usual diet    10/12/2020 4:51 PM EDT by Cony Ledezma      general diet    (X) Oral hydration and medications    Medications    (X) Anticoagulants [G]    10/12/2020 4:51 PM EDT by Cony Ledezma      eliquis    * Milestone    Additional Notes    10/10       VS-    Vitals: /77   Pulse 101   Temp 95.7 °F (35.4 °C) (Axillary)   Resp 18   Ht 6' 1\" (1.854 m)   Wt 245 lb (111.1 kg)   SpO2 98% RA       Critical care note**    Assessment and Plan         Jeff Avalos II is a 53 y.o. male with a PMHx of bilateral PE 2020 s/p IVC filter on 2020, HTN, DM2 insulin-dependent, and HLD initially hypertension    - BP stable    - Continue Lopressor 25 mg twice daily    - Continue to monitor BP    7. History of type 2 diabetes mellitus, insulin-dependent    - Continue Lantus 25 units daily with LDSS    - Continue to monitor blood sugar       **Internal MD note**    Assessment & Plan    1. Left lower extremity DVT    2. COVID-19    3. PE    4. Iron Deficiency anemia    5. Diabetes Mellitus Type II    6. Hypertension         Vascular and hematology following for DVT. Patient currently on Heparin. Monitor hemoglobin closely. Monitor sugars closely. 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 Prophylactic dose but he has multiple risk factors    - Seen by vascular, s/p JUAN. Plan for take back to angio tomorrow    - Case discussed with Dr. Nam Bonilla. We are both in agreement that given his prior surgical intervention, bleeding risk will be low and benefits outweigh risks in regard to DC on full dose oral AC with NOAC (Eliquis)    - Trend CBC    - Will follow         10/9/20    - Patient had repeat angio today which showed residual clot. The catheter was repositioned.  currently on TPN heparin.    - Vascular surg following    - Follow-up angio scheduled for tomorrow    - Plts 477 and WBCs 16.8 trending down Hgb 7.6 stable         10/10/20    - Angiogram today    - WBCs 14.3 Hgb 8.5  Plts 523    - AC on DC as above

## 2020-10-14 NOTE — PROGRESS NOTES
Comprehensive Nutrition Assessment    Type and Reason for Visit:  Initial(LOS)    Nutrition Recommendations/Plan: Recommend and start Ensure High Protein supplement BID and Kraig wound healing supplement BID to help meet increased nutritional needs from wound healing. Nutrition Assessment:  Patients po intake seems to be adequate, averaging ~75% of most meals served since admission ; pt at nutritional risk d/t increased needs from wound healing ; pt also COVID-19 positive ; s/p ex lap CHOLECYSTECTOMY/BOWEL RESECTION/right darian colectomy/partial omentectomy/splenectomy on 9/18 ; s/p ILIAC STENT PLACEMENT on 10/11 ; will start ONS    Malnutrition Assessment:  Malnutrition Status:  Insufficient data    Context:  Acute Illness     Findings of the 6 clinical characteristics of malnutrition:  Energy Intake:  No significant decrease in energy intake  Weight Loss:  Unable to assess(d/t lack of weight history and possible fluid shifts)     Body Fat Loss:  Unable to assess(data not available to assess at this time d/t COVID isolation)     Muscle Mass Loss:  Unable to assess(data not available to assess at this time d/t COVID-isolation)    Fluid Accumulation:  No significant fluid accumulation     Strength:  Not Performed    Estimated Daily Nutrient Needs:  Energy (kcal):  7558-0817 (REE 2125 x 1.2 SF); Weight Used for Energy Requirements:  Current     Protein (g):  125-150 (1.5-1.8g/kg IBW); Weight Used for Protein Requirements:  Ideal        Fluid (ml/day):  8628-8997; Weight Used for Fluid Requirements:  Dix      Nutrition Related Findings:  +I&Os (+2.8 L), 1-3+ edema, hypoactive BS, rounded abd, A&O X 4, blind L eye, missing teeth, obesity, puncture site, redness to LLE      Wounds:  Multiple, Open Wounds, Stage IV(wounds x 2 noted)       Current Nutrition Therapies:    DIET GENERAL;     Anthropometric Measures:  · Height: 6' 1\" (185.4 cm)  · Current Body Weight: 262 lb (118.8 kg)(10/12/20, [facility])

## 2020-10-15 ENCOUNTER — APPOINTMENT (OUTPATIENT)
Dept: ULTRASOUND IMAGING | Age: 45
DRG: 182 | End: 2020-10-15
Payer: COMMERCIAL

## 2020-10-15 VITALS
BODY MASS INDEX: 34.72 KG/M2 | SYSTOLIC BLOOD PRESSURE: 118 MMHG | TEMPERATURE: 97.9 F | HEIGHT: 73 IN | OXYGEN SATURATION: 98 % | RESPIRATION RATE: 16 BRPM | WEIGHT: 262 LBS | DIASTOLIC BLOOD PRESSURE: 58 MMHG | HEART RATE: 108 BPM

## 2020-10-15 LAB
ANION GAP SERPL CALCULATED.3IONS-SCNC: 10 MMOL/L (ref 7–16)
BUN BLDV-MCNC: 5 MG/DL (ref 6–20)
CALCIUM SERPL-MCNC: 8.4 MG/DL (ref 8.6–10.2)
CHLORIDE BLD-SCNC: 103 MMOL/L (ref 98–107)
CO2: 23 MMOL/L (ref 22–29)
CREAT SERPL-MCNC: 0.6 MG/DL (ref 0.7–1.2)
GFR AFRICAN AMERICAN: >60
GFR NON-AFRICAN AMERICAN: >60 ML/MIN/1.73
GLUCOSE BLD-MCNC: 79 MG/DL (ref 74–99)
HCT VFR BLD CALC: 30.4 % (ref 37–54)
HEMOGLOBIN: 9.3 G/DL (ref 12.5–16.5)
MCH RBC QN AUTO: 27.5 PG (ref 26–35)
MCHC RBC AUTO-ENTMCNC: 30.6 % (ref 32–34.5)
MCV RBC AUTO: 89.9 FL (ref 80–99.9)
METER GLUCOSE: 111 MG/DL (ref 74–99)
METER GLUCOSE: 95 MG/DL (ref 74–99)
PDW BLD-RTO: 18.2 FL (ref 11.5–15)
PLATELET # BLD: 617 E9/L (ref 130–450)
PMV BLD AUTO: 9.1 FL (ref 7–12)
POTASSIUM SERPL-SCNC: 4.5 MMOL/L (ref 3.5–5)
RBC # BLD: 3.38 E12/L (ref 3.8–5.8)
SODIUM BLD-SCNC: 136 MMOL/L (ref 132–146)
WBC # BLD: 7.9 E9/L (ref 4.5–11.5)

## 2020-10-15 PROCEDURE — 93971 EXTREMITY STUDY: CPT

## 2020-10-15 PROCEDURE — 80048 BASIC METABOLIC PNL TOTAL CA: CPT

## 2020-10-15 PROCEDURE — 6370000000 HC RX 637 (ALT 250 FOR IP): Performed by: SURGERY

## 2020-10-15 PROCEDURE — 6370000000 HC RX 637 (ALT 250 FOR IP): Performed by: INTERNAL MEDICINE

## 2020-10-15 PROCEDURE — 85027 COMPLETE CBC AUTOMATED: CPT

## 2020-10-15 PROCEDURE — 97530 THERAPEUTIC ACTIVITIES: CPT

## 2020-10-15 PROCEDURE — 6370000000 HC RX 637 (ALT 250 FOR IP): Performed by: NURSE PRACTITIONER

## 2020-10-15 PROCEDURE — 82962 GLUCOSE BLOOD TEST: CPT

## 2020-10-15 PROCEDURE — 93971 EXTREMITY STUDY: CPT | Performed by: RADIOLOGY

## 2020-10-15 PROCEDURE — 99239 HOSP IP/OBS DSCHRG MGMT >30: CPT | Performed by: INTERNAL MEDICINE

## 2020-10-15 PROCEDURE — 2580000003 HC RX 258: Performed by: SURGERY

## 2020-10-15 RX ORDER — LISINOPRIL 5 MG/1
5 TABLET ORAL DAILY
Qty: 30 TABLET | Refills: 0 | Status: SHIPPED | OUTPATIENT
Start: 2020-10-16 | End: 2021-06-28 | Stop reason: SDUPTHER

## 2020-10-15 RX ORDER — LISINOPRIL 5 MG/1
5 TABLET ORAL DAILY
Status: DISCONTINUED | OUTPATIENT
Start: 2020-10-15 | End: 2020-10-15 | Stop reason: HOSPADM

## 2020-10-15 RX ORDER — METOPROLOL SUCCINATE 25 MG/1
25 TABLET, EXTENDED RELEASE ORAL DAILY
Status: DISCONTINUED | OUTPATIENT
Start: 2020-10-16 | End: 2020-10-15 | Stop reason: HOSPADM

## 2020-10-15 RX ORDER — METOPROLOL SUCCINATE 25 MG/1
25 TABLET, EXTENDED RELEASE ORAL DAILY
Qty: 30 TABLET | Refills: 0 | Status: SHIPPED | OUTPATIENT
Start: 2020-10-16 | End: 2021-09-20 | Stop reason: SDUPTHER

## 2020-10-15 RX ORDER — POTASSIUM CHLORIDE 20 MEQ/1
20 TABLET, EXTENDED RELEASE ORAL 2 TIMES DAILY WITH MEALS
Qty: 60 TABLET | Refills: 3 | Status: SHIPPED | OUTPATIENT
Start: 2020-10-15 | End: 2021-10-08

## 2020-10-15 RX ADMIN — CYCLOBENZAPRINE 10 MG: 10 TABLET, FILM COATED ORAL at 09:45

## 2020-10-15 RX ADMIN — Medication 10 ML: at 09:45

## 2020-10-15 RX ADMIN — PANTOPRAZOLE SODIUM 40 MG: 40 TABLET, DELAYED RELEASE ORAL at 09:45

## 2020-10-15 RX ADMIN — OXYCODONE 5 MG: 5 TABLET ORAL at 16:30

## 2020-10-15 RX ADMIN — HYDROXYUREA 500 MG: 500 CAPSULE ORAL at 09:45

## 2020-10-15 RX ADMIN — LISINOPRIL 5 MG: 5 TABLET ORAL at 12:57

## 2020-10-15 RX ADMIN — DULOXETINE HYDROCHLORIDE 30 MG: 30 CAPSULE, DELAYED RELEASE ORAL at 09:45

## 2020-10-15 RX ADMIN — CHOLESTYRAMINE 4 G: 4 POWDER, FOR SUSPENSION ORAL at 09:46

## 2020-10-15 RX ADMIN — LOPERAMIDE HYDROCHLORIDE 2 MG: 2 CAPSULE ORAL at 16:30

## 2020-10-15 RX ADMIN — APIXABAN 5 MG: 5 TABLET, FILM COATED ORAL at 09:46

## 2020-10-15 RX ADMIN — METOPROLOL TARTRATE 25 MG: 25 TABLET, FILM COATED ORAL at 09:45

## 2020-10-15 RX ADMIN — FERROUS SULFATE TAB 325 MG (65 MG ELEMENTAL FE) 325 MG: 325 (65 FE) TAB at 09:45

## 2020-10-15 RX ADMIN — LOPERAMIDE HYDROCHLORIDE 2 MG: 2 CAPSULE ORAL at 01:13

## 2020-10-15 RX ADMIN — LOPERAMIDE HYDROCHLORIDE 2 MG: 2 CAPSULE ORAL at 09:53

## 2020-10-15 RX ADMIN — POTASSIUM CHLORIDE 20 MEQ: 1500 TABLET, EXTENDED RELEASE ORAL at 16:30

## 2020-10-15 RX ADMIN — OXYCODONE 5 MG: 5 TABLET ORAL at 04:00

## 2020-10-15 RX ADMIN — FENOFIBRATE 54 MG: 54 TABLET ORAL at 09:45

## 2020-10-15 RX ADMIN — POTASSIUM CHLORIDE 20 MEQ: 1500 TABLET, EXTENDED RELEASE ORAL at 09:45

## 2020-10-15 RX ADMIN — OXYCODONE 5 MG: 5 TABLET ORAL at 09:53

## 2020-10-15 RX ADMIN — CYCLOBENZAPRINE 10 MG: 10 TABLET, FILM COATED ORAL at 13:51

## 2020-10-15 ASSESSMENT — PAIN SCALES - GENERAL
PAINLEVEL_OUTOF10: 9
PAINLEVEL_OUTOF10: 9
PAINLEVEL_OUTOF10: 6
PAINLEVEL_OUTOF10: 3
PAINLEVEL_OUTOF10: 8

## 2020-10-15 ASSESSMENT — PAIN DESCRIPTION - DESCRIPTORS: DESCRIPTORS: ACHING;DISCOMFORT;DULL

## 2020-10-15 ASSESSMENT — PAIN DESCRIPTION - ONSET: ONSET: ON-GOING

## 2020-10-15 ASSESSMENT — PAIN SCALES - WONG BAKER
WONGBAKER_NUMERICALRESPONSE: 0
WONGBAKER_NUMERICALRESPONSE: 0

## 2020-10-15 ASSESSMENT — PAIN DESCRIPTION - ORIENTATION: ORIENTATION: LEFT;RIGHT;LOWER;MID

## 2020-10-15 ASSESSMENT — PAIN - FUNCTIONAL ASSESSMENT: PAIN_FUNCTIONAL_ASSESSMENT: ACTIVITIES ARE NOT PREVENTED

## 2020-10-15 ASSESSMENT — PAIN DESCRIPTION - LOCATION: LOCATION: ABDOMEN

## 2020-10-15 ASSESSMENT — PAIN DESCRIPTION - FREQUENCY: FREQUENCY: CONTINUOUS

## 2020-10-15 ASSESSMENT — PAIN DESCRIPTION - PROGRESSION: CLINICAL_PROGRESSION: GRADUALLY WORSENING

## 2020-10-15 ASSESSMENT — PAIN DESCRIPTION - PAIN TYPE: TYPE: ACUTE PAIN;SURGICAL PAIN

## 2020-10-15 NOTE — PROGRESS NOTES
Physical Therapy  Physical Therapy Treatment Note    Name: Nesha Metz  : 1975  MRN: [id number]    Referring Provider: Michelle Alatorre MD    Date of Service: 10/15/2020    Evaluating PT: Lisa Higginbotham, PT, DPT, [id number]    Room #: 7249/7101-L  Diagnosis: DVT  PMHx/PSHx: Displacement of lumbar intervertebral disc without myelopathy, chronic back pain, head injury, OA, HTN, fibromyalgia, DM, HLD, depression  Procedure/Surgery: Placement of catheter in IVC, venogram of IVC and left lower extremity, placement of EKOS lysis catheter (10/8), venogram of IVC and left lower extremity with existing lysis catheter and removal of lysis catheter (10/11)  Pertinent Information: Exploratory laparotomy, cholecystectomy, bowel resection, and R hemicolectomy (20)  Precautions: Fall risk, Droplet Plus Isolation (COVID-19), abdominal precautions, R wrist drop, NWB R UE (per patient)    SUBJECTIVE:    Pt was admitted from [place] [address]. Pt lives alone in an  with 2 stairs and a rail to enter. Pt ambulated with [facility] for short distances with PT at [facility]. OBJECTIVE:   Initial Evaluation  Date: 10/12/20 Treatment Date: 10/15/20 Short Term/ Long Term   Goals   AM-PAC 6 Clicks 49/59 42/46    Was pt agreeable to Eval/treatment? Yes Yes    Does pt have pain? 9-10/10 R shoulder, lower buttocks, groin, L ankle; RN aware 8/10 L LE, back, and stomach pain    Bed Mobility  Rolling: Supervision  Supine to sit: Supervision  Sit to supine: Supervision  Scooting: Supervision to EOB Rolling: NT  Supine to sit: Independent   Sit to supine: Independent   Scooting: Independent  Rolling: Independent   Supine to sit:  Independent   Sit to supine: Independent   Scooting: Independent    Transfers Sit to stand: SBA  Stand to sit: SBA  Stand pivot: NT Sit to stand: Supervision  Stand to sit: Supervision  Stand pivot: Supervision without AD Sit to stand: Supervision  Stand to sit: Supervision  Stand pivot: Supervision with [facility]   Ambulation   Sidestepped 2 feet x2 HHA with Min A 25 feet x2 without AD with Supervision >50 feet with SPC with Supervision   Stair negotiation: ascended and descended NT NT >4 step(s) with 1 rail(s) with Supervision   ROM BUE: Refer to OT note  BLE: WFL NT    Strength BUE: Refer to OT note  BLE: WFL NT    Balance Sitting EOB: Supervision  Dynamic Standing: Min A with HHA Sitting EOB: Independent   Dynamic Standing: Supervision without AD Sitting EOB: Independent   Dynamic Standing: Supervision with SPC     Pt is A & O x: 4 to person, place, month/year, and situation. Sensation: NT  Edema: Unremarkable. Therapeutic Exercises:   5x STS from chair with Supervision  Ankle pumps x10 reps    Patient education  Pt educated on health benefits of functional mobility while in hospital.    Patient response to education:   Pt verbalized understanding Pt demonstrated skill Pt requires further education in this area   Yes Yes Reinforce     ASSESSMENT:    Vitals on RA:  After ambulating 25 feet to/from commode without AD: O2 sat 100%,  bpm  Seated rest in chair: O2 sat 97%,  bpm  After 5x STS exercise: O2 sat 95%,  bpm  Conclusion of session: O2 sat 98%,  bpm    Comments:    Pt was supine in bed upon room entry, agreeable to PT treatment. Pt ambulates with mild unsteadiness and normal gait speed. Pt requested to use commode. Pt had fair dynamic standing balance at commode as he performed self care. Pt ambulated back to bedside chair and was seated. Pt completed 5x STS exercise. Pt was left seated in chair with all needs met at conclusion of session. Treatment:  Patient practiced and was instructed in the following treatment:     Therapeutic activities: Pt completed all therapeutic activities noted above. Pt was cued for safety when ambulating. Dynamic standing balance was challenged at commode for 5+ minutes during self care.  Pt completed multiple transfers from surfaces of varying heights (EOB x1, commode x1, chair x5).   Therapeutic exercises: Pt completed all therapeutic exercises noted above. PLAN:    Patient is making good progress towards established goals. Will continue with current POC.       Time in: 0901  Time out: 0915    Total Treatment Time 14 minutes     CPT codes:  [] Gait training 65423 0 minutes  [] Manual therapy 10458 0 minutes  [x] Therapeutic activities 17726 14 minutes  [] Therapeutic exercises 27637 0 minutes  [] Neuromuscular reeducation 15264 0 minutes      Mae Hernandez, PT, DPT   [id number]

## 2020-10-15 NOTE — CARE COORDINATION
SOCIAL WORK/DISCHARGE PLANNING;  Spoke with Mr. Nora Cottrell at [facility]. He reported, insurance pre-cert has been obtained and pt can return today at an snf level. Spoke with pt via phone and notified him. He is agreeable to transport. Asked pt if he wanted me to notify family and he stated,\"you don't have to\". [facility] to transport pt at 5:00p. m([facility] request late transport). Notified pt's RN-will obtain discharge order.   Julia Nunez [facility]  [phone]

## 2020-10-15 NOTE — PLAN OF CARE
Problem: Skin Integrity:  Goal: Will show no infection signs and symptoms  Description: Will show no infection signs and symptoms  Outcome: Completed  Goal: Absence of new skin breakdown  Description: Absence of new skin breakdown  Outcome: Completed     Problem: Falls - Risk of:  Goal: Will remain free from falls  Description: Will remain free from falls  Outcome: Completed  Goal: Absence of physical injury  Description: Absence of physical injury  Outcome: Completed     Problem: Pain:  Goal: Pain level will decrease  Description: Pain level will decrease  Outcome: Completed  Goal: Control of acute pain  Description: Control of acute pain  Outcome: Completed  Goal: Control of chronic pain  Description: Control of chronic pain  Outcome: Completed     Problem: Airway Clearance - Ineffective  Goal: Achieve or maintain patent airway  Outcome: Completed     Problem: Gas Exchange - Impaired  Goal: Absence of hypoxia  Outcome: Completed  Goal: Promote optimal lung function  Outcome: Completed     Problem: Breathing Pattern - Ineffective  Goal: Ability to achieve and maintain a regular respiratory rate  Outcome: Completed     Problem:  Body Temperature -  Risk of, Imbalanced  Goal: Ability to maintain a body temperature within defined limits  Outcome: Completed  Goal: Will regain or maintain usual level of consciousness  Outcome: Completed  Goal: Complications related to the disease process, condition or treatment will be avoided or minimized  Outcome: Completed     Problem: Isolation Precautions - Risk of Spread of Infection  Goal: Prevent transmission of infection  Outcome: Completed     Problem: Nutrition Deficits  Goal: Optimize nutrtional status  Outcome: Completed     Problem: Risk for Fluid Volume Deficit  Goal: Maintain normal heart rhythm  Outcome: Completed  Goal: Maintain absence of muscle cramping  Outcome: Completed  Goal: Maintain normal serum potassium, sodium, calcium, phosphorus, and pH  Outcome:

## 2020-10-15 NOTE — DISCHARGE INSTR - COC
Continuity of Care Form    Patient Name: Marlene Brito   :  1975  MRN:  [id number]    Admit date:  10/7/2020  Discharge date:  10/15/20    Code Status Order: Full Code   Advance Directives:   Advance Care Flowsheet Documentation     Date/Time Healthcare Directive Type of Healthcare Directive Copy in [address] Agent's Name Healthcare Agent's Phone Number    10/07/20 2045  No, patient does not have an advance directive for healthcare treatment -- -- -- -- --          Admitting Physician:  Avelina Cage MD  PCP: Flaquita Neil, APRN - CNP    Discharging Nurse: [facility] Unit/Room#: [address] [facility] [address] Unit Phone Number: [phone]    Emergency Contact:   Extended Emergency Contact Information  Primary Emergency Contact: Valentin Paige, [address] Phone: [phone]  Relation: Parent  Preferred language: Antonydab[place]ea   needed? No  Secondary Emergency Contact: Jadyn Walden [facility] [address] Phone: [phone]  Mobile Phone: [phone]  Relation: Child  Preferred language: English   needed?  No    Past Surgical History:  Past Surgical History:   Procedure Laterality Date    COLONOSCOPY N/A 2020    COLONOSCOPY WITH BIOPSY performed by Alen Dee MD at [address] PTC NEW ACCESS  8/3/2020    CT PTC NEW ACCESS 8/3/2020 [facility] CT    FOOT SURGERY Right     to treat shattered bones    HERNIA REPAIR      DOUBLE HERNIA    HERNIA REPAIR Right 2019    LAPAROSCOPIC RIGHT INGUINAL HERNIA REPAIR, MESH 10x15 cm PLACEMENT performed by Laura Schaefer MD at [address] [place] [address] Left 2016    ILIAC ARTERY STENT INSERTION N/A 10/11/2020    S/P ILIAC STENT PLACEMENT VISUALIZATION performed by [address] [place] [address] MD Jerrica at [place] [address] N/A 2020    LAPAROTOMY EXPLORATORY, CHOLECYSTECTOMY, BOWEL RESECTION, right darian colectomy, partial omentectomy, and splenectomy performed by Meron Limon R65.10    Cellulitis of sacral region L03.319    Rectus diastasis M62.08    Recurrent unilateral inguinal hernia K40.91    Enterocolitis K52.9    Decubitus ulcer of sacral area L89.159    Abnormal findings on diagnostic imaging of gall bladder R93.2    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 8XXA    Other hyperlipidemia E78.49    COVID-19 virus infection U07.1       Isolation/Infection:   Isolation          Droplet Plus        Patient Infection Status     Infection Onset Added Last Indicated Last Indicated By Review Planned Expiration Resolved Resolved By    COVID-19 10/09/20 10/09/20 10/09/20 COVID-19 10/16/20 10/23/20      Resolved    COVID-19 Rule Out 10/09/20 10/09/20 10/09/20 COVID-19 (Ordered)   10/09/20 Rule-Out Test Resulted    C-diff Rule Out 09/23/20 09/23/20 09/23/20 Clostridium difficile EIA (Ordered)   09/24/20 Rule-Out Test Resulted    COVID-19 Rule Out 09/21/20 09/21/20 09/21/20 Covid-19 Ambulatory (Ordered)   09/23/20 Rule-Out Test Resulted    COVID-19 Rule Out 09/04/20 09/04/20 09/04/20 COVID-19 (Ordered)   09/04/20 Rule-Out Test Resulted    COVID-19 Rule Out 08/06/20 08/06/20 08/06/20 COVID-19 (Ordered)   08/06/20 Rule-Out Test Resulted    C-diff Rule Out 07/31/20 07/31/20 07/31/20 Clostridium difficile EIA (Ordered)   08/06/20 Patt Chawla RN    Order discontinued by RN/physician    MRSA 06/30/19 07/02/19 06/30/19 Wound Culture   08/06/20 Patt Chawla, RN          Nurse Assessment:  Last Vital Signs: BP (!) 118/58 Pulse 108   Temp 97.9 °F (36.6 °C) (Oral)   Resp 16   Ht 6' 1\" (1.854 m)   Wt 262 lb (118.8 kg)   SpO2 98%   BMI 34.57 kg/m²     Last documented pain score (0-10 scale): Pain Level: 9  Last Weight:   Wt Readings from Last 1 Encounters:   10/12/20 262 lb (118.8 kg)     Mental Status:  {IP PT MENTAL STATUS:91521}    IV Access:  { MILANA IV ACCESS:973451240}    Nursing Mobility/ADLs:  Walking   {CHP DME LCGY:680202707}  Transfer  {CHP DME ZYM}  Bathing  {CHP DME YECC:346011791}  Dressing  {CHP DME DQXI:006752724}  Toileting  {CHP DME UKDR:600648728}  Feeding  {CHP DME FRDV:416056102}  Med Admin  {P DME PASV:765515268}  Med Delivery   { MILANA MED Delivery:732398313}    Wound Care Documentation and Therapy:  Incision 16 Hip Left (Active)   Number of days: 2532       Incision 10/31/16 Hip Right (Active)   Number of days: 7785       Wound 10/08/20 Coccyx (Active)   Wound Image   10/09/20 1354   Wound Etiology Pressure Stage  4 10/09/20 1354   Dressing Status Clean;Dry; Intact 10/15/20 015   Wound Cleansed Cleansed with saline 10/13/20 2045   Dressing/Treatment ABD; Alginate;Tape change 10/15/20 015   Dressing Change Due 10/15/20 10/15/20 015   Wound Length (cm) 1 cm 10/12/20 0845   Wound Width (cm) 0.5 cm 10/12/20 0845   Wound Depth (cm) 3.5 cm 10/12/20 0845   Wound Surface Area (cm^2) 0.5 cm^2 10/12/20 0845   Change in Wound Size % (l*w) 0 10/12/20 0845   Wound Volume (cm^3) 1.75 cm^3 10/12/20 0845   Wound Healing % 0 10/12/20 0845   Undermining Starts ___ O'Clock 12 10/09/20 1354   Undermining Ends___ O'Clock 3 10/09/20 1354   Undermining Maxium Distance (cm) 2.8 10/09/20 1354   Wound Assessment Other (Comment) 10/15/20 0155   Drainage Amount Scant 10/14/20 0845   Drainage Description Serosanguinous 10/14/20 0845   Odor None 10/14/20 0845   Loli-wound Assessment Intact 10/14/20 0845   Number of days: 7       Wound 10/09/20 Abdomen Mid (Active)   Wound Image   10/09/20 1354   Wound Etiology Non-Healing Surgical 10/09/20 1354   Dressing Status Clean;Dry; Intact 10/15/20 0155   Wound Cleansed Irrigated with saline 10/13/20 2045   Dressing/Treatment ABD; Alginate;Tape change 10/15/20 0155   Dressing Change Due 10/15/20 10/15/20 0155   Wound Length (cm) 1.2 cm 10/09/20 1354   Wound Width (cm) 0.8 cm 10/09/20 1354   Wound Depth (cm) 1.6 cm 10/09/20 1354   Wound Surface Area (cm^2) 0.96 cm^2 10/09/20 1354   Wound Volume (cm^3) 1.54 cm^3 10/09/20 1354   Tunneling Position ___ O'Clock 12 10/09/20 1354   Undermining Starts ___ O'Clock 2.2 10/09/20 1354   Wound Assessment Other (Comment) 10/15/20 0155   Drainage Amount Small 10/14/20 0845   Drainage Description Serous 10/14/20 0845   Odor None 10/14/20 0845   Loli-wound Assessment Intact 10/14/20 0845   Number of days: 5        Elimination:  Continence:   · Bowel: {YES / UO:38879}  · Bladder: {YES / KU:42337}  Urinary Catheter: {Urinary Catheter:770490148}   Colostomy/Ileostomy/Ileal Conduit: {YES / LY:20726}       Date of Last BM: ***    Intake/Output Summary (Last 24 hours) at 10/15/2020 1152  Last data filed at 10/14/2020 2149  Gross per 24 hour   Intake 0 ml   Output 0 ml   Net 0 ml     No intake/output data recorded.     Safety Concerns:     [address] [email or web address] Safety Concerns:851431542}    Impairments/Disabilities:      [address] [email or web address] Impairments/Disabilities:933698407}    Nutrition Therapy:  Current Nutrition Therapy:   [address] [email or web address] Diet List:576673481}    Routes of Feeding: {CHP DME Other Feedings:208491868}  Liquids: {Slp liquid thickness:71304}  Daily Fluid Restriction: {CHP DME Yes amt example:753654009}  Last Modified Barium Swallow with Video (Video Swallowing Test): {Done Not Done WVTP:538269389}    Treatments at the Time of Hospital Discharge:   Respiratory Treatments: ***  Oxygen Therapy:  {Therapy; copd oxygen:81871}  Ventilator:    {LEDY TOBIAS Vent CUKM:726736139}    Rehab Therapies: {THERAPEUTIC INTERVENTION:1285547030}  Weight Bearing Status/Restrictions: {LEDY TOBIAS Weight Bearin}  Other Medical Equipment (for information only, NOT a DME order):  {EQUIPMENT:275613809}  Other Treatments: ***    Patient's personal belongings (please select all that are sent with patient):  {CHP DME Belongings:245251492}    RN SIGNATURE:  {Esignature:833052859}    CASE MANAGEMENT/SOCIAL WORK SECTION    Inpatient Status Date: ***    Readmission Risk Assessment Score:  Readmission Risk              Risk of Unplanned Readmission:        32           Discharging to Facility/ Agency   · Name: [facility]  · Address:  · Phone:[phone]  · Fax:    Dialysis Facility (if applicable)   · Name:  · Address:  · Dialysis Schedule:  · Phone:  · Fax:    / signature: {Esignature:667951974}    PHYSICIAN SECTION    Prognosis: {Prognosis:1417976916}    Condition at Discharge: [address] [place] [address] Patient Condition:959686399}    Rehab Potential (if transferring to Rehab): {Prognosis:5118137116}    Recommended Labs or Other Treatments After Discharge: ***    Physician Certification: I certify the above information and transfer of Chris Maldonado  is necessary for the continuing treatment of the diagnosis listed and that he requires Justen Kaleb for greater 30 days.      Update Admission H&P: {CHP DME Changes in NQFLQ:066558633}    PHYSICIAN SIGNATURE:  {Esignature:660406406}

## 2020-10-15 NOTE — DISCHARGE SUMMARY
Discharge Summary    Patient:  Andres Li  YOB: 1975    MRN: [id number]   Acct: [de-identified]    Primary Care Physician: IGOR Campos CNP    Admit date:  10/7/2020    Discharge date:   10/15/2020      Discharge Diagnoses:   Acute deep vein thrombosis (DVT) ([facility] [address]  Principal Problem:    Acute deep vein thrombosis (DVT) ([facility] [address]  Active Problems:    Essential hypertension    Type 2 diabetes mellitus ([facility] [address]    Enterocolitis    Splenic infarction    Other pulmonary embolism without acute cor pulmonale (HCC)    Thrombocytosis (HCC)    Acute blood loss anemia    Skin wound from surgical incision    Other hyperlipidemia    COVID-19 virus infection  Resolved Problems:    * No resolved hospital problems. *        Admitted for: (HPI) see admission H&P    Hospital Course: Summary: Mr. Opal Price, a 54 yo man was admitted 10/7/2020 for left leg swelling. 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 Pt was discharged to nursing home on lovenox. He presented from SNF for L leg swelling. In ED he was found to have DVT - started on heparin drip and admitted for further evaluation and treatment.   Consultation was placed to vascular surgery, hematology and surgery.  He underwent ECMO lysis catheter placement and is getting TPA and heparin infusion and plan angiogram [10/13] was not done. COVID-19 positive [10/9/2020]  Repeat ultrasound [10/15] demonstrated nonocclusive deep venous thrombosis in the left iliac the left common femoral the left profundofemoral left superficial femoral, proximal mid and distal popliteal posterior tibial and anterior tibial veins. In the prior examination [10/7] was more extensive and was occlusive. Therefore TPA and heparin infusion through ECMO was partially successful in lysis of the occlusion but residual thrombus is present patient will need to have repeat ultrasound Doppler of the left lower extremity in 7 to 10 days to see improvement. Patient was started on Eliquis after hematology consult on (10/11/2020) and likely will need continued lifelong anticoagulation.    - [10/14-10/15]  . # DVT/HxPE/COVID: On apixaban [10/11]. COVID-19 positive [10/9/2020]. RA sat 94-97%. Left calf 1 inch greater than right calf with residual edema. - Continue apixaban  -Repeat left lower leg Doppler to examine resolution of DVT  . # DM/HLD: POC gluc 121-138-> . Good control.   - Continue insulin glargine 25 units/d, lispro insulin 0-6 units subcu TID WC and 0-3 units hs; Pravastatin 20 mg hs, fenofibrate 54 mg/d  . # HTN: -136/52-78-> /55-62. Change metoprolol titrate 25 mg bid-> XL daily and add lisinopril 5mg/d  . # Thrombocytosis/An: Ptt 368-> 550->611-> 617. Hbg 9.8-> 10.6-> 8.7-> 9.7. Continue hydroxyurea and ferrous sulfate     CODE STATUS: Full    Consultants: Hematology Dr. Laurita Telles, vascular surgery Dr. Antonette Rachel, ID Dr. Amanda Beaulieu and Cardiology Dr. Noah Spencer. Hematology Dr. Laurita Telles [10/10/2020]  ASSESSMENT/PLAN :  54 yo male  BL PE s/p IVC  Hx GI bleed  S/p ex lap with cholecystectomy and R hemicolectomy with small bowel resection and side-to-side ileocolonic anastomosis with splenic thrombosis and infarction s/p splenectomy and omentectomy     - LLE DVT, extensive on US  - Likely provoked in nature due to immobility/stasis. Prophylactic dose but he has multiple risk factors  - Seen by vascular, s/p EKOS. Plan for take back to angio tomorrow  - Case discussed with Dr. Nathan Kirkpatrick.  We are both in agreement that given his prior surgical intervention, bleeding risk will be low and benefits outweigh risks in regard to DC on full dose oral AC with NOAC (Eliquis)  - Trend CBC  - Will follow    Discharge Medications:       Medication List      START taking these medications    apixaban 5 MG Tabs tablet  Commonly known as:  ELIQUIS  Take 1 tablet by mouth 2 times daily     lisinopril 5 MG tablet  Commonly known as:  PRINIVIL;ZESTRIL  Take 1 tablet by mouth daily  Start taking on:  October 16, 2020     metoprolol succinate 25 MG extended release tablet  Commonly known as:  TOPROL XL  Take 1 tablet by mouth daily  Start taking on:  October 16, 2020     potassium chloride 20 MEQ extended release tablet  Commonly known as:  KLOR-CON M  Take 1 tablet by mouth 2 times daily (with meals)        CHANGE how you take these medications    * insulin lispro 100 UNIT/ML injection vial  Commonly known as:  HUMALOG  What changed:  Another medication with the same name was added. Make sure you understand how and when to take each. * insulin lispro 100 UNIT/ML injection vial  Commonly known as:  HUMALOG  Inject 0-3 Units into the skin nightly **Corrective Bedtime (50%) Low Dose Algorithm**   Glucose: Dose:                No Insulin   140-249 1 Unit   250-349 2 Units   Over 350 3 Units  What changed: You were already taking a medication with the same name, and this prescription was added. Make sure you understand how and when to take each. * This list has 2 medication(s) that are the same as other medications prescribed for you. Read the directions carefully, and ask your doctor or other care provider to review them with you.             CONTINUE taking these medications    cholestyramine 4 g packet  Commonly known as:  QUESTRAN  Take 1 packet by mouth 2 times daily     cyclobenzaprine 10 MG tablet  Commonly known as:  FLEXERIL     DULoxetine 30 MG extended release capsule  Commonly known as:  CYMBALTA  Take 1 capsule by mouth daily     fenofibrate 54 MG tablet  Commonly known as:  TRICOR     ferrous sulfate 325 (65 Fe) MG tablet  Commonly known as:  IRON 325     fluticasone 50 MCG/ACT nasal spray  Commonly known as:  FLONASE     glucose 40 % Gel  Commonly known as:  GLUTOSE  Take 37.5 mLs by mouth as needed (low sugar)     hydroxyurea 500 MG chemo capsule  Commonly known as:  HYDREA     insulin glargine 100 UNIT/ML injection vial  Commonly known as:  LANTUS  Inject 25 Units into the skin Daily     loperamide 2 MG capsule  Commonly known as:  IMODIUM     melatonin 3 MG Tabs tablet     metoprolol tartrate 25 MG tablet  Commonly known as:  LOPRESSOR  Take 1 tablet by mouth 2 times daily     pantoprazole 40 MG tablet  Commonly known as:  PROTONIX     pravastatin 20 MG tablet  Commonly known as:  PRAVACHOL  Take 1 tablet by mouth nightly     pregabalin 150 MG capsule  Commonly known as:  LYRICA        STOP taking these medications    enoxaparin 40 MG/0.4ML injection  Commonly known as:  LOVENOX           Where to Get Your Medications      You can get these medications from any pharmacy    Bring a paper prescription for each of these medications  · apixaban 5 MG Tabs tablet  · insulin lispro 100 UNIT/ML injection vial  · lisinopril 5 MG tablet  · metoprolol succinate 25 MG extended release tablet  · potassium chloride 20 MEQ extended release tablet           Physical Exam:    Vitals:  Vitals:    10/14/20 2116 10/15/20 0100 10/15/20 0155 10/15/20 0930   BP: 128/62 98/60  (!) 118/58   Pulse: 109 95 78 108   Resp: 16 18  16   Temp: 98.2 °F (36.8 °C) 98.1 °F (36.7 °C)  97.9 °F (36.6 °C)   TempSrc: Oral Oral  Oral   SpO2:   95% 98%   Weight:       Height:         Weight: Weight: 262 lb (118.8 kg)     24 hour intake/output:    Intake/Output Summary (Last 24 hours) at 10/15/2020 1325  Last data filed at 10/14/2020 2149  Gross per 24 hour   Intake 0 ml   Output 0 ml   Net 0 ml       General: AAO to person, place, time, in no acute distress, bulging; moderate habitus; BMI 34  Head and neck : PERRLA, EOMI . Sclera non icteric. Oropharynx : Clear  Neck: no JVD,  no adenopathy.   Heart: Regular rate and regular rhythm, no murmur  Lungs: Clear to auscultation   Extremities: +LLE edema: Improved left calf is approximately 1 inch in diameter greater than right calf  Abdomen: Soft, non-tender; +surgical scars: Incision well-healed supra umbilicus dime size opening  Skin: coccyx wound, abdominal wound covered dressing  Neurologic:Cranial nerves grossly intact. No focal motor or sensory deficits .       Procedures: TPA infusion into popliteal vein    Diagnostic Test: See below    Radiology reports as per the Radiologist  Radiology:       EXAM: US DUP LOWER EXTREMITY LEFT JOREG  Date:  10/15/2020 10:49 AM   There is evidence for deep venous thrombosis in the left iliac the left common femoral the left profundofemoral left superficial femoral, proximal mid and distal popliteal posterior tibial and anterior tibial veins. There is otherwise good compressibility, there is good augmentation, there is good color flow. Impression   There is evidence for deep venous thrombosis       Cta Pulmonary W Contrast Date: 10/7/2020  EXAMINATION: CTA OF THE CHEST 10/7/2020 2:48 pm TECHNIQUE: CTA of the chest was performed after the administration of intravenous contrast.  Multiplanar reformatted images are provided for review. MIP images are provided for review. Dose modulation, iterative reconstruction, and/or weight based adjustment of the mA/kV was utilized to reduce the radiation dose to as low as reasonably achievable. COMPARISON: September 17, 2020 HISTORY: ORDERING SYSTEM PROVIDED HISTORY: DVT, tachy, r/o PE TECHNOLOGIST PROVIDED HISTORY: Reason for exam:->DVT, tachy, r/o PE What reading provider will be dictating this exam?->CRC FINDINGS: There is a filling defect present within posterior right lower lobe segmental pulmonary artery. Subtle filling defect is present within lingular segmental pulmonary artery as seen on axial image number 125. No evidence of saddle embolism. The heart is normal in size. No pericardial effusion.   There is no mediastinal or hilar lymphadenopathy. There are minimal areas of subsegmental atelectasis in lung bases. There is minimal new left pleural effusion. No evidence of pneumonia. There is no pneumothorax. View of the upper abdomen shows normal bilateral adrenal glands. Postsurgical changes with areas of subcutaneous emphysema are associated with subxiphoid anterior abdominal wall. Postsurgical changes are also present within upper abdomen below margin of the liver. 1.  Small filling defect is located within right lower lobe segmental pulmonary artery and small filling defect is present within lingular segmental pulmonary artery. These findings are related to chronic bilateral pulmonary emboli. [place] of pulmonary embolus has decreased compared to prior from 2020. 2.  New minimal left pleural effusion. 3. Foci of gas are associated with the subxiphoid abdominal wall as well as areas of inflammation located in right upper abdomen below margin of liver. Clinical correlation recommended for recent surgery. Critical results were called by Dr. Maryam Loco to JOHN Briscoe on 10/7/2020 at 15:06. Us Dup Lower Extremity Left Jorge  Date: 10/7/2020  Patient MRN:  [id number] : 1975 Age: 53 years Gender: Male Order Date:  10/7/2020 2:16 PM EXAM: US DUP LOWER EXTREMITY LEFT JORGE NUMBER OF IMAGES:  25 INDICATION:  leg pain, swelling, r/o DVT leg pain, swelling, r/o DVT What reading provider will be dictating this exam?->[facility] COMPARISON: None There is evidence for deep venous thrombosis, which is occlusive in the left iliac common femoral and profunda superficial femoral and tibial peroneal trunk and the anterior and posterior tibial veins There is otherwise good compressibility, there is good augmentation, there is good color flow.      There is evidence for a large deep venous thrombosis involving the entire left leg from the left iliac to the level of the trifurcation trifurcation veins below the knee ALERT:  THIS IS AN ABNORMAL REPORT        Results for orders placed or performed during the hospital encounter of 10/07/20   CBC Auto Differential   Result Value Ref Range    WBC 10.6 4.5 - 11.5 E9/L    RBC 3.03 (L) 3.80 - 5.80 E12/L    Hemoglobin 8.3 (L) 12.5 - 16.5 g/dL    Hematocrit 26.9 (L) 37.0 - 54.0 %    MCV 88.8 80.0 - 99.9 fL    MCH 27.4 26.0 - 35.0 pg    MCHC 30.9 (L) 32.0 - 34.5 %    RDW 17.9 (H) 11.5 - 15.0 fL    Platelets 223 (H) 835 - 450 E9/L    MPV 8.4 7.0 - 12.0 fL    Neutrophils % 71.1 43.0 - 80.0 %    Immature Granulocytes % 0.5 0.0 - 5.0 %    Lymphocytes % 17.2 (L) 20.0 - 42.0 %    Monocytes % 9.5 2.0 - 12.0 %    Eosinophils % 1.5 0.0 - 6.0 %    Basophils % 0.2 0.0 - 2.0 %    Neutrophils Absolute 7.52 (H) 1.80 - 7.30 E9/L    Immature Granulocytes # 0.05 E9/L    Lymphocytes Absolute 1.82 1.50 - 4.00 E9/L    Monocytes Absolute 1.01 (H) 0.10 - 0.95 E9/L    Eosinophils Absolute 0.16 0.05 - 0.50 E9/L    Basophils Absolute 0.02 0.00 - 0.20 E9/L   Comprehensive Metabolic Panel w/ Reflex to MG   Result Value Ref Range    Sodium 136 132 - 146 mmol/L    Potassium reflex Magnesium 3.8 3.5 - 5.0 mmol/L    Chloride 99 98 - 107 mmol/L    CO2 23 22 - 29 mmol/L    Anion Gap 14 7 - 16 mmol/L    Glucose 134 (H) 74 - 99 mg/dL    BUN 8 6 - 20 mg/dL    CREATININE 0.8 0.7 - 1.2 mg/dL    GFR Non-African American >60 >=60 mL/min/1.73    GFR African American >60     Calcium 8.5 (L) 8.6 - 10.2 mg/dL    Total Protein 6.0 (L) 6.4 - 8.3 g/dL    Alb 2.6 (L) 3.5 - 5.2 g/dL    Total Bilirubin <0.2 0.0 - 1.2 mg/dL    Alkaline Phosphatase 171 (H) 40 - 129 U/L    ALT 13 0 - 40 U/L    AST 12 0 - 39 U/L   APTT   Result Value Ref Range    aPTT 30.3 24.5 - 35.1 sec   Protime-INR   Result Value Ref Range    Protime 14.2 (H) 9.3 - 12.4 sec    INR 1.3    Brain Natriuretic Peptide   Result Value Ref Range    Pro- (H) 0 - 125 pg/mL   Troponin   Result Value Ref Range    Troponin <0.01 0.00 - 0.03 ng/mL   APTT   Result Value Ref Range    aPTT 159.6 (H) 24.5 - 35.1 sec   CBC   Result Value Ref Range    WBC 12.4 (H) 4.5 - 11.5 E9/L    RBC 2.88 (L) 3.80 - 5.80 E12/L    Hemoglobin 7.8 (L) 12.5 - 16.5 g/dL    Hematocrit 25.6 (L) 37.0 - 54.0 %    MCV 88.9 80.0 - 99.9 fL    MCH 27.1 26.0 - 35.0 pg    MCHC 30.5 (L) 32.0 - 34.5 %    RDW 18.0 (H) 11.5 - 15.0 fL    Platelets 738 (H) 229 - 450 E9/L    MPV 8.6 7.0 - 12.0 fL   Basic Metabolic Panel w/ Reflex to MG   Result Value Ref Range    Sodium 138 132 - 146 mmol/L    Potassium reflex Magnesium 3.5 3.5 - 5.0 mmol/L    Chloride 100 98 - 107 mmol/L    CO2 25 22 - 29 mmol/L    Anion Gap 13 7 - 16 mmol/L    Glucose 111 (H) 74 - 99 mg/dL    BUN 10 6 - 20 mg/dL    CREATININE 0.8 0.7 - 1.2 mg/dL    GFR Non-African American >60 >=60 mL/min/1.73    GFR African American >60     Calcium 8.5 (L) 8.6 - 10.2 mg/dL   APTT   Result Value Ref Range    aPTT 44.7 (H) 24.5 - 35.1 sec   APTT   Result Value Ref Range    aPTT 55.9 (H) 24.5 - 35.1 sec   Magnesium   Result Value Ref Range    Magnesium 1.6 1.6 - 2.6 mg/dL   CBC   Result Value Ref Range    WBC 16.8 (H) 4.5 - 11.5 E9/L    RBC 2.83 (L) 3.80 - 5.80 E12/L    Hemoglobin 7.7 (L) 12.5 - 16.5 g/dL    Hematocrit 24.7 (L) 37.0 - 54.0 %    MCV 87.3 80.0 - 99.9 fL    MCH 27.2 26.0 - 35.0 pg    MCHC 31.2 (L) 32.0 - 34.5 %    RDW 17.8 (H) 11.5 - 15.0 fL    Platelets 683 (H) 152 - 450 E9/L    MPV 8.8 7.0 - 12.0 fL   CBC   Result Value Ref Range    WBC 19.7 (H) 4.5 - 11.5 E9/L    RBC 2.64 (L) 3.80 - 5.80 E12/L    Hemoglobin 7.3 (L) 12.5 - 16.5 g/dL    Hematocrit 22.5 (L) 37.0 - 54.0 %    MCV 85.2 80.0 - 99.9 fL    MCH 27.7 26.0 - 35.0 pg    MCHC 32.4 32.0 - 34.5 %    RDW 17.2 (H) 11.5 - 15.0 fL    Platelets 484 (H) 070 - 450 E9/L    MPV 8.4 7.0 - 12.0 fL   APTT   Result Value Ref Range    aPTT 33.0 24.5 - 35.1 sec   APTT   Result Value Ref Range    aPTT 57.5 (H) 24.5 - 35.1 sec   Fibrinogen   Result Value Ref Range    Fibrinogen >700 (H) 225 - 540 mg/dL   Fibrinogen   Result Value Ref Range    Fibrinogen 653 (H) 225 - 540 mg/dL   Basic Metabolic Panel   Result Value Ref Range    Sodium 140 132 - 146 mmol/L    Potassium 3.5 3.5 - 5.0 mmol/L    Chloride 103 98 - 107 mmol/L    CO2 22 22 - 29 mmol/L    Anion Gap 15 7 - 16 mmol/L    Glucose 99 74 - 99 mg/dL    BUN 8 6 - 20 mg/dL    CREATININE 0.7 0.7 - 1.2 mg/dL    GFR Non-African American >60 >=60 mL/min/1.73    GFR African American >60     Calcium 8.3 (L) 8.6 - 10.2 mg/dL   APTT   Result Value Ref Range    aPTT 49.4 (H) 24.5 - 35.1 sec   APTT   Result Value Ref Range    aPTT 47.5 (H) 24.5 - 35.1 sec   CBC   Result Value Ref Range    WBC 17.6 (H) 4.5 - 11.5 E9/L    RBC 2.76 (L) 3.80 - 5.80 E12/L    Hemoglobin 7.5 (L) 12.5 - 16.5 g/dL    Hematocrit 23.8 (L) 37.0 - 54.0 %    MCV 86.2 80.0 - 99.9 fL    MCH 27.2 26.0 - 35.0 pg    MCHC 31.5 (L) 32.0 - 34.5 %    RDW 17.6 (H) 11.5 - 15.0 fL    Platelets 991 (H) 581 - 450 E9/L    MPV 8.7 7.0 - 12.0 fL   CBC   Result Value Ref Range    WBC 16.8 (H) 4.5 - 11.5 E9/L    RBC 2.82 (L) 3.80 - 5.80 E12/L    Hemoglobin 7.6 (L) 12.5 - 16.5 g/dL    Hematocrit 24.6 (L) 37.0 - 54.0 %    MCV 87.2 80.0 - 99.9 fL    MCH 27.0 26.0 - 35.0 pg    MCHC 30.9 (L) 32.0 - 34.5 %    RDW 17.9 (H) 11.5 - 15.0 fL    Platelets 287 (H) 628 - 450 E9/L    MPV 8.5 7.0 - 12.0 fL   Fibrinogen   Result Value Ref Range    Fibrinogen 525 225 - 540 mg/dL   Fibrinogen   Result Value Ref Range    Fibrinogen 479 225 - 540 mg/dL   APTT   Result Value Ref Range    aPTT 38.7 (H) 24.5 - 35.1 sec   CBC   Result Value Ref Range    WBC 14.3 (H) 4.5 - 11.5 E9/L    RBC 2.88 (L) 3.80 - 5.80 E12/L    Hemoglobin 7.8 (L) 12.5 - 16.5 g/dL    Hematocrit 25.4 (L) 37.0 - 54.0 %    MCV 88.2 80.0 - 99.9 fL    MCH 27.1 26.0 - 35.0 pg    MCHC 30.7 (L) 32.0 - 34.5 %    RDW 17.9 (H) 11.5 - 15.0 fL    Platelets 218 (H) 243 - 450 E9/L    MPV 8.6 7.0 - 12.0 fL   Fibrinogen   Result Value Ref Range    Fibrinogen 472 225 - 540 mg/dL   CBC   Result Value Ref Range WBC 14.9 (H) 4.5 - 11.5 E9/L    RBC 2.91 (L) 3.80 - 5.80 E12/L    Hemoglobin 7.9 (L) 12.5 - 16.5 g/dL    Hematocrit 25.8 (L) 37.0 - 54.0 %    MCV 88.7 80.0 - 99.9 fL    MCH 27.1 26.0 - 35.0 pg    MCHC 30.6 (L) 32.0 - 34.5 %    RDW 18.2 (H) 11.5 - 15.0 fL    Platelets 556 (H) 089 - 450 E9/L    MPV 9.1 7.0 - 12.0 fL   COVID-19   Result Value Ref Range    SARS-CoV-2, NAAT DETECTED (A) Not Detected   Basic Metabolic Panel   Result Value Ref Range    Sodium 136 132 - 146 mmol/L    Potassium 3.5 3.5 - 5.0 mmol/L    Chloride 99 98 - 107 mmol/L    CO2 21 (L) 22 - 29 mmol/L    Anion Gap 16 7 - 16 mmol/L    Glucose 69 (L) 74 - 99 mg/dL    BUN 8 6 - 20 mg/dL    CREATININE 0.7 0.7 - 1.2 mg/dL    GFR Non-African American >60 >=60 mL/min/1.73    GFR African American >60     Calcium 8.8 8.6 - 10.2 mg/dL   APTT   Result Value Ref Range    aPTT 36.1 (H) 24.5 - 35.1 sec   CBC   Result Value Ref Range    WBC 14.3 (H) 4.5 - 11.5 E9/L    RBC 3.11 (L) 3.80 - 5.80 E12/L    Hemoglobin 8.5 (L) 12.5 - 16.5 g/dL    Hematocrit 27.5 (L) 37.0 - 54.0 %    MCV 88.4 80.0 - 99.9 fL    MCH 27.3 26.0 - 35.0 pg    MCHC 30.9 (L) 32.0 - 34.5 %    RDW 18.0 (H) 11.5 - 15.0 fL    Platelets 067 (H) 077 - 450 E9/L    MPV 8.6 7.0 - 12.0 fL   Fibrinogen   Result Value Ref Range    Fibrinogen 443 225 - 540 mg/dL   PROTIME-INR   Result Value Ref Range    Protime 16.3 (H) 9.3 - 12.4 sec    INR 1.4    Ferritin   Result Value Ref Range    Ferritin 539 ng/mL   C-reactive protein   Result Value Ref Range    CRP 16.9 (H) 0.0 - 0.4 mg/dL   APTT   Result Value Ref Range    aPTT 55.2 (H) 24.5 - 35.1 sec   CBC   Result Value Ref Range    WBC 15.1 (H) 4.5 - 11.5 E9/L    RBC 2.99 (L) 3.80 - 5.80 E12/L    Hemoglobin 8.0 (L) 12.5 - 16.5 g/dL    Hematocrit 26.1 (L) 37.0 - 54.0 %    MCV 87.3 80.0 - 99.9 fL    MCH 26.8 26.0 - 35.0 pg    MCHC 30.7 (L) 32.0 - 34.5 %    RDW 18.1 (H) 11.5 - 15.0 fL    Platelets 518 (H) 832 - 450 E9/L    MPV 8.6 7.0 - 12.0 fL   Fibrinogen   Result Value Ref Range    Fibrinogen 431 225 - 540 mg/dL   SPECIMEN REJECTION   Result Value Ref Range    Rejected Test PRR FIBR     Reason for Rejection see below    MAGNESIUM   Result Value Ref Range    Magnesium 1.6 1.6 - 2.6 mg/dL   Phosphorus   Result Value Ref Range    Phosphorus 2.9 2.5 - 4.5 mg/dL   APTT   Result Value Ref Range    aPTT 45.6 (H) 24.5 - 35.1 sec   CBC   Result Value Ref Range    WBC 14.4 (H) 4.5 - 11.5 E9/L    RBC 2.82 (L) 3.80 - 5.80 E12/L    Hemoglobin 7.6 (L) 12.5 - 16.5 g/dL    Hematocrit 24.2 (L) 37.0 - 54.0 %    MCV 85.8 80.0 - 99.9 fL    MCH 27.0 26.0 - 35.0 pg    MCHC 31.4 (L) 32.0 - 34.5 %    RDW 17.6 (H) 11.5 - 15.0 fL    Platelets 660 887 - 412 E9/L    MPV 8.8 7.0 - 12.0 fL   Fibrinogen   Result Value Ref Range    Fibrinogen 373 225 - 540 mg/dL   APTT   Result Value Ref Range    aPTT 40.1 (H) 24.5 - 35.1 sec   CBC   Result Value Ref Range    WBC 12.3 (H) 4.5 - 11.5 E9/L    RBC 2.33 (L) 3.80 - 5.80 E12/L    Hemoglobin 6.3 (L) 12.5 - 16.5 g/dL    Hematocrit 20.1 (L) 37.0 - 54.0 %    MCV 86.3 80.0 - 99.9 fL    MCH 27.0 26.0 - 35.0 pg    MCHC 31.3 (L) 32.0 - 34.5 %    RDW 17.7 (H) 11.5 - 15.0 fL    Platelets 452 082 - 349 E9/L    MPV 9.2 7.0 - 12.0 fL   Fibrinogen   Result Value Ref Range    Fibrinogen 328 225 - 540 mg/dL   Basic Metabolic Panel   Result Value Ref Range    Sodium 139 132 - 146 mmol/L    Potassium 3.4 (L) 3.5 - 5.0 mmol/L    Chloride 103 98 - 107 mmol/L    CO2 23 22 - 29 mmol/L    Anion Gap 13 7 - 16 mmol/L    Glucose 93 74 - 99 mg/dL    BUN 5 (L) 6 - 20 mg/dL    CREATININE 0.6 (L) 0.7 - 1.2 mg/dL    GFR Non-African American >60 >=60 mL/min/1.73    GFR African American >60     Calcium 8.1 (L) 8.6 - 10.2 mg/dL   PROTIME-INR   Result Value Ref Range    Protime 16.2 (H) 9.3 - 12.4 sec    INR 1.4    APTT   Result Value Ref Range    aPTT 36.3 (H) 24.5 - 35.1 sec   CBC   Result Value Ref Range    WBC 11.4 4.5 - 11.5 E9/L    RBC 3.17 (L) 3.80 - 5.80 E12/L    Hemoglobin 8.9 (L) 12.5 - 16.5 g/dL    Hematocrit 27.5 (L) 37.0 - 54.0 %    MCV 86.8 80.0 - 99.9 fL    MCH 28.1 26.0 - 35.0 pg    MCHC 32.4 32.0 - 34.5 %    RDW 16.8 (H) 11.5 - 15.0 fL    Platelets 235 493 - 580 E9/L    MPV 8.8 7.0 - 12.0 fL   Fibrinogen   Result Value Ref Range    Fibrinogen 306 225 - 540 mg/dL   Magnesium   Result Value Ref Range    Magnesium 1.5 (L) 1.6 - 2.6 mg/dL   Hemoglobin and hematocrit, blood   Result Value Ref Range    Hemoglobin 9.1 (L) 12.5 - 16.5 g/dL    Hematocrit 27.7 (L) 37.0 - 54.0 %   Basic Metabolic Panel   Result Value Ref Range    Sodium 139 132 - 146 mmol/L    Potassium 3.9 3.5 - 5.0 mmol/L    Chloride 112 (H) 98 - 107 mmol/L    CO2 18 (L) 22 - 29 mmol/L    Anion Gap 9 7 - 16 mmol/L    Glucose 67 (L) 74 - 99 mg/dL    BUN 3 (L) 6 - 20 mg/dL    CREATININE 0.4 (L) 0.7 - 1.2 mg/dL    GFR Non-African American >60 >=60 mL/min/1.73    GFR African American >60     Calcium 5.8 (LL) 8.6 - 10.2 mg/dL   CBC   Result Value Ref Range    WBC 7.1 4.5 - 11.5 E9/L    RBC 2.50 (L) 3.80 - 5.80 E12/L    Hemoglobin 7.1 (L) 12.5 - 16.5 g/dL    Hematocrit 22.3 (L) 37.0 - 54.0 %    MCV 89.2 80.0 - 99.9 fL    MCH 28.4 26.0 - 35.0 pg    MCHC 31.8 (L) 32.0 - 34.5 %    RDW 17.4 (H) 11.5 - 15.0 fL    Platelets 785 323 - 605 E9/L    MPV 9.3 7.0 - 12.0 fL   Basic Metabolic Panel   Result Value Ref Range    Sodium 139 132 - 146 mmol/L    Potassium 3.7 3.5 - 5.0 mmol/L    Chloride 102 98 - 107 mmol/L    CO2 26 22 - 29 mmol/L    Anion Gap 11 7 - 16 mmol/L    Glucose 101 (H) 74 - 99 mg/dL    BUN 4 (L) 6 - 20 mg/dL    CREATININE 0.6 (L) 0.7 - 1.2 mg/dL    GFR Non-African American >60 >=60 mL/min/1.73    GFR African American >60     Calcium 8.5 (L) 8.6 - 10.2 mg/dL   CBC   Result Value Ref Range    WBC 7.5 4.5 - 11.5 E9/L    RBC 3.40 (L) 3.80 - 5.80 E12/L    Hemoglobin 9.5 (L) 12.5 - 16.5 g/dL    Hematocrit 30.6 (L) 37.0 - 54.0 %    MCV 90.0 80.0 - 99.9 fL    MCH 27.9 26.0 - 35.0 pg    MCHC 31.0 (L) 32.0 - 34.5 %    RDW 17.7 (H) 11.5 - 15.0 fL    Platelets 940 (H) 331 - 450 E9/L    MPV 8.7 7.0 - 12.0 fL   CBC Auto Differential   Result Value Ref Range    WBC 8.9 4.5 - 11.5 E9/L    RBC 3.53 (L) 3.80 - 5.80 E12/L    Hemoglobin 9.8 (L) 12.5 - 16.5 g/dL    Hematocrit 30.8 (L) 37.0 - 54.0 %    MCV 87.3 80.0 - 99.9 fL    MCH 27.8 26.0 - 35.0 pg    MCHC 31.8 (L) 32.0 - 34.5 %    RDW 17.6 (H) 11.5 - 15.0 fL    Platelets 386 (H) 771 - 450 E9/L    MPV 8.7 7.0 - 12.0 fL    Neutrophils % 65.9 43.0 - 80.0 %    Immature Granulocytes % 0.9 0.0 - 5.0 %    Lymphocytes % 13.6 (L) 20.0 - 42.0 %    Monocytes % 14.9 (H) 2.0 - 12.0 %    Eosinophils % 4.2 0.0 - 6.0 %    Basophils % 0.5 0.0 - 2.0 %    Neutrophils Absolute 5.85 1.80 - 7.30 E9/L    Immature Granulocytes # 0.08 E9/L    Lymphocytes Absolute 1.21 (L) 1.50 - 4.00 E9/L    Monocytes Absolute 1.32 (H) 0.10 - 0.95 E9/L    Eosinophils Absolute 0.37 0.05 - 0.50 E9/L    Basophils Absolute 0.04 0.00 - 0.20 E9/L   Hemoglobin and hematocrit, blood   Result Value Ref Range    Hemoglobin 10.0 (L) 12.5 - 16.5 g/dL    Hematocrit 31.9 (L) 37.0 - 54.0 %   Hemoglobin and hematocrit, blood   Result Value Ref Range    Hemoglobin 10.6 (L) 12.5 - 16.5 g/dL    Hematocrit 35.7 (L) 37.0 - 54.0 %   Hemoglobin and hematocrit, blood   Result Value Ref Range    Hemoglobin 8.8 (L) 12.5 - 16.5 g/dL    Hematocrit 28.3 (L) 37.0 - 54.0 %   Basic Metabolic Panel   Result Value Ref Range    Sodium 137 132 - 146 mmol/L    Potassium 4.1 3.5 - 5.0 mmol/L    Chloride 102 98 - 107 mmol/L    CO2 21 (L) 22 - 29 mmol/L    Anion Gap 14 7 - 16 mmol/L    Glucose 121 (H) 74 - 99 mg/dL    BUN 4 (L) 6 - 20 mg/dL    CREATININE 0.6 (L) 0.7 - 1.2 mg/dL    GFR Non-African American >60 >=60 mL/min/1.73    GFR African American >60     Calcium 8.1 (L) 8.6 - 10.2 mg/dL   CBC   Result Value Ref Range    WBC 6.6 4.5 - 11.5 E9/L    RBC 3.18 (L) 3.80 - 5.80 E12/L    Hemoglobin 8.7 (L) 12.5 - 16.5 g/dL    Hematocrit 28.3 (L) 37.0 - 54.0 %    MCV 89.0 80.0 - 99.9 fL MCH 27.4 26.0 - 35.0 pg    MCHC 30.7 (L) 32.0 - 34.5 %    RDW 17.9 (H) 11.5 - 15.0 fL    Platelets 230 (H) 500 - 450 E9/L    MPV 8.7 7.0 - 12.0 fL   Hemoglobin and hematocrit, blood   Result Value Ref Range    Hemoglobin 8.7 (L) 12.5 - 16.5 g/dL    Hematocrit 27.8 (L) 37.0 - 54.0 %   Hemoglobin and hematocrit, blood   Result Value Ref Range    Hemoglobin 9.6 (L) 12.5 - 16.5 g/dL    Hematocrit 30.9 (L) 37.0 - 54.0 %   Basic Metabolic Panel   Result Value Ref Range    Sodium 136 132 - 146 mmol/L    Potassium 4.5 3.5 - 5.0 mmol/L    Chloride 103 98 - 107 mmol/L    CO2 23 22 - 29 mmol/L    Anion Gap 10 7 - 16 mmol/L    Glucose 79 74 - 99 mg/dL    BUN 5 (L) 6 - 20 mg/dL    CREATININE 0.6 (L) 0.7 - 1.2 mg/dL    GFR Non-African American >60 >=60 mL/min/1.73    GFR African American >60     Calcium 8.4 (L) 8.6 - 10.2 mg/dL   CBC   Result Value Ref Range    WBC 7.9 4.5 - 11.5 E9/L    RBC 3.38 (L) 3.80 - 5.80 E12/L    Hemoglobin 9.3 (L) 12.5 - 16.5 g/dL    Hematocrit 30.4 (L) 37.0 - 54.0 %    MCV 89.9 80.0 - 99.9 fL    MCH 27.5 26.0 - 35.0 pg    MCHC 30.6 (L) 32.0 - 34.5 %    RDW 18.2 (H) 11.5 - 15.0 fL    Platelets 704 (H) 272 - 450 E9/L    MPV 9.1 7.0 - 12.0 fL   POCT Glucose   Result Value Ref Range    Meter Glucose 75 74 - 99 mg/dL   POCT Glucose   Result Value Ref Range    Meter Glucose 102 (H) 74 - 99 mg/dL   POCT Glucose   Result Value Ref Range    Meter Glucose 94 74 - 99 mg/dL   POCT Glucose   Result Value Ref Range    Meter Glucose 87 74 - 99 mg/dL   POCT Glucose   Result Value Ref Range    Meter Glucose 87 74 - 99 mg/dL   POCT Glucose   Result Value Ref Range    Meter Glucose 91 74 - 99 mg/dL   POCT Glucose   Result Value Ref Range    Meter Glucose 88 74 - 99 mg/dL   POCT Glucose   Result Value Ref Range    Meter Glucose 88 74 - 99 mg/dL   POCT Glucose   Result Value Ref Range    Meter Glucose 94 74 - 99 mg/dL   POCT Glucose   Result Value Ref Range    Meter Glucose 82 74 - 99 mg/dL   POCT Glucose   Result Value Ref (CROSSMATCH), 2 Units   Result Value Ref Range    Product Code Blood Bank [id number]     Description Blood Bank Red Blood Cells, Leuko-reduced     Unit Number [id number]     Dispense Status Blood Bank transfused     Product Code Blood Bank [id number]     Description Blood Bank Red Blood Cells, Leuko-reduced     Unit Number [id number]     Dispense Status Blood Bank transfused        Diet:  DIET GENERAL;  Dietary Nutrition Supplements: Low Calorie High Protein Supplement  Dietary Nutrition Supplements: Wound Healing Oral Supplement    Activity:  Activity as tolerated (Patient may move about with assist as indicated or with supervision.)    Follow-up:  in 5-7 days with IGOR Thibodeaux CNP, follow-up with hematology and vascular surgery in 2 to 3 weeks; repeat ultrasound Doppler of the left lower extremity in 7 to 10 days to see improvement.      Disposition: SNF    Condition: Stable      Time Spent: 45 minutes    Electronically signed by Filiberto Perales MD on 10/15/2020 at 1:25 PM    Discharging Hospitalist

## 2020-10-15 NOTE — PROGRESS NOTES
inferior vena cava ([address] 10/10/2020    SIMÓN (acute kidney injury) ([address] 2008 apx    kidney bruised due to fall / Valeri Goo    Allergic rhinitis     Chronic back pain     Depression     Diabetes mellitus ([address]     Difficulty sleeping     at times    Displacement of lumbar intervertebral disc without myelopathy     Fibromyalgia     Fractured rib     2008 / healed    H/O seasonal allergies     Head injury 1980'S apx    no residual s/s    Hyperlipidemia     Hypertension     Obesity (BMI 35.0-39.9 without comorbidity)     bmi 39.2  weight 296 #    Osteoarthritis     Thoracic or lumbosacral neuritis or radiculitis, unspecified        Patient Active Problem List    Diagnosis Date Noted    Acute thrombosis of inferior vena cava ([address] 10/10/2020    COVID-19 virus infection 10/09/2020    Acute deep vein thrombosis (DVT) ([address] 10/07/2020    SIMÓN (acute kidney injury) ([address] 09/17/2020    Septicemia ([address] 09/17/2020    Intra-abdominal infection 09/17/2020    Acute GI bleeding 09/03/2020    Acute blood loss anemia     Skin wound from surgical incision 08/13/2020    Thrombocytosis ([address] 08/10/2020    Decubitus ulcer of sacral area 08/01/2020    Abnormal findings on diagnostic imaging of gall bladder 08/01/2020    Splenic infarction 08/01/2020    Cyst of spleen 08/01/2020    Splenic abscess 08/01/2020    Anemia 08/01/2020    Other pulmonary embolism without acute cor pulmonale ([address] 08/01/2020    Enterocolitis 07/31/2020    Rectus diastasis 11/01/2019    Recurrent unilateral inguinal hernia 11/01/2019    Cellulitis of sacral region 07/02/2019    Cellulitis of foot 06/30/2019    Neuropathy 06/30/2019    Cellulitis, wound, post-operative 06/30/2019    Left leg swelling 06/30/2019    SIRS (systemic inflammatory response syndrome) ([address] 06/30/2019    Other hyperlipidemia 10/13/2017    Primary osteoarthritis of right hip 11/03/2016    Primary osteoarthritis of left hip 04/11/2016    Type 2 insulin lispro (HUMALOG) 100 UNIT/ML injection vial Inject 3 Units into the skin 3 times daily (before meals) Injects 3 units three times daily before meals in addition to following sliding scale  : 0 units  141-180: 1 unit  181-220: 2 units  221-260: 3 units  261-300: 4 units  301-340: 5 units  >341: 6 units and call MD    Historical Provider, MD   pantoprazole (PROTONIX) 40 MG tablet Take 40 mg by mouth daily    Historical Provider, MD   DULoxetine (CYMBALTA) 30 MG extended release capsule Take 1 capsule by mouth daily 5/14/20   IGOR Colvin CNP   pravastatin (PRAVACHOL) 20 MG tablet Take 1 tablet by mouth nightly 5/14/20   IGOR Colvin CNP       Allergies  Allergies   Allergen Reactions    Seasonal      HAYFEVER / sneezing,watery eyes    Tramadol Itching       Review of Systems: +LLE edema and pain. Otherwise 12 point ROS negative      Objective  BP (!) 118/58   Pulse 108   Temp 97.9 °F (36.6 °C) (Oral)   Resp 16   Ht 6' 1\" (1.854 m)   Wt 262 lb (118.8 kg)   SpO2 98%   BMI 34.57 kg/m²     Physical Exam:   Performance Status:  General: AAO to person, place, time, in no acute distress,   Head and neck : PERRLA, EOMI . Sclera non icteric. Oropharynx : Clear  Neck: no JVD,  no adenopathy. Heart: Regular rate and regular rhythm, no murmur  Lungs: Clear to auscultation   Extremities: +LLE edema  Abdomen: Soft, non-tender; +surgical scars  Skin: coccyx wound, abdominal wound covered dressing  Neurologic:Cranial nerves grossly intact. No focal motor or sensory deficits .     Recent Laboratory Data-   Lab Results   Component Value Date    WBC 7.9 10/15/2020    HGB 9.3 (L) 10/15/2020    HCT 30.4 (L) 10/15/2020    MCV 89.9 10/15/2020     (H) 10/15/2020    LYMPHOPCT 13.6 (L) 10/12/2020    RBC 3.38 (L) 10/15/2020    MCH 27.5 10/15/2020    MCHC 30.6 (L) 10/15/2020    RDW 18.2 (H) 10/15/2020    NEUTOPHILPCT 65.9 10/12/2020    MONOPCT 14.9 (H) 10/12/2020    BASOPCT 0.5 10/12/2020 NEUTROABS 5.85 10/12/2020    LYMPHSABS 1.21 (L) 10/12/2020    MONOSABS 1.32 (H) 10/12/2020    EOSABS 0.37 10/12/2020    BASOSABS 0.04 10/12/2020       Lab Results   Component Value Date     10/15/2020    K 4.5 10/15/2020     10/15/2020    CO2 23 10/15/2020    BUN 5 (L) 10/15/2020    CREATININE 0.6 (L) 10/15/2020    GLUCOSE 79 10/15/2020    CALCIUM 8.4 (L) 10/15/2020    PROT 6.0 (L) 10/07/2020    LABALBU 2.6 (L) 10/07/2020    BILITOT <0.2 10/07/2020    ALKPHOS 171 (H) 10/07/2020    AST 12 10/07/2020    ALT 13 10/07/2020    LABGLOM >60 10/15/2020    GFRAA >60 10/15/2020       Lab Results   Component Value Date    IRON 24 (L) 2020    TIBC 145 (L) 2020    FERRITIN 539 10/10/2020           Radiology-    Ct Abdomen Pelvis W Iv Contrast Additional Contrast? None    Result Date: 2020  Patient MRN:  [id number] : 1975 Age: 53 years Gender: Male Order Date:  2020 4:51 PM EXAM: CT ABDOMEN PELVIS W IV CONTRAST NUMBER OF IMAGES \ views:  414 INDICATION: Dizziness, abdominal pain COMPARISON: Contemporaneous CTA chest study was performed, CT abdomen pelvis with contrast 8435 TECHNIQUE: Helical imaging was extended from the lower chest to the lower pelvis in conjunction with the intravenous administration of 90 mL of Isovue-370, and axial images were supplemented with sagittal and coronal MPR images. Low-dose CT  acquisition technique included one of following options; 1 . Automated exposure control, 2. Adjustment of MA and or KV according to patient's size or 3. Use of iterative reconstruction. FINDINGS: CHEST: Contemporaneous CTA imaging documented bilateral pulmonary emboli without right heart strain or acute pulmonary or pleural complications. LIVER: The liver is uniform in parenchymal density, and no focal lesions are identified. There is diffuse fatty change.  GALLBLADDER AND BILIARY TREE: There is a pigtail drainage catheter in the gallbladder fossa, and it is uncertain if this is a post cholecystectomy drain or if this is a cholecystostomy associated with complete collapse of the gallbladder. There is no biliary ductal ectasia. SPLEEN: A circumscribed hypodense structure in the splenic bed could be a hypodense or infarcted spleen, but shows no evidence of extracapsular fluid or inflammatory change. The finding measures about 10 cm length by about 7 cm in thickness. It is uncertain if this represents residual following splenectomy, but there is no mention in the electronic record of splenectomy. . ADRENALS:  The adrenals are normal in appearance bilaterally. PANCREAS:The pancreas shows no evidence of acute inflammation or mass lesions. KIDNEYS/BLADDER: The kidneys show uniform cortical enhancement and no evidence of outflow obstruction. There is no perinephric inflammatory change. Ureters are similar in course and caliber, and the bladder is normal in appearance. APPENDIX:  Normal BOWEL:  There is no evidence of inflammation, obstruction, or perforation of enteric structures. There is some fluid filled distal ileum, and the previously identified thickening of jejunal loops in the left upper abdomen is again documented. The appearance suggests enteritis with focal stenosis in the anterior right paramedian mid abdomen suggesting a focal stricture. There is no evidence of fistula formation. Crohn's disease would be a differential diagnostic consideration. PELVIS:  There is no pelvic adenopathy or dependent free pelvic fluid. Salvatore Ra LYMPHATICS: Numerous small lymph nodes are clustered about the small bowel mesenteric root, and there are a few borderline prominent lymph nodes beneath the renal vascular pedicles. VASCULAR: There is no evidence of acute vascular pathology involving mesenteric or retroperitoneal great vessels. There is an inferior vena cava filter. SKELETAL: Mild levoscoliotic curvature and degenerative changes of the lumbar spine are noted.  There are bilateral hip arthroplasty elements without acute complications. There is persistent small bowel mural thickening in the midabdomen involving the jejunum and proximal to mid ileum, with an apparent stenosis or focal stricture at about the level of the renal vascular pedicles and inferior vena cava filter in the right paramedian anterior abdomen. There is no evidence of perforation or obstruction, however. There is a pigtail drainage catheter in the gallbladder fossa, which could be a cholecystostomy catheter or a drainage catheter in the gallbladder fossa following cholecystectomy. The gallbladder is present, it is completely collapsed. A cystic appearing structure is noted in the splenic bed, and could be a splenic remnant or a low-density spleen, not prominently enlarged. Xr Chest Portable    Result Date: 2020  Patient MRN:  [id number] : 1975 Age: 53 years Gender: Male Order Date:  2020 11:58 PM TECHNIQUE/NUMBER OF IMAGES/COMPARISON/CLINICAL HISTORY: Chest AP 1 image one view Comparison 10/17/2020. Central venous line placed. FINDINGS: Right internal jugular central venous catheter tip in SVC. There is no pneumothorax on the right breast. Lungs are clear. Heart and small size, mediastinum unremarkable. There is no perihilar vascular congestion. No acute cardiopulmonary process. Xr Chest Portable    Result Date: 2020  Single frontal projection (one view) of the chest. COMPARISON: 2020 FINDINGS: The heart, lungs, mediastinum and regional skeleton are unremarkable. The costophrenic angles are sharp and clear. There is no evidence of mediastinal shift. The heart is not enlarged. There is no evidence of hilar adenopathy. Lungs are negative for evidence of acute airspace consolidation, effusion, atelectasis, pneumothorax, pathologic nodularity or interstitial thickening.  Regional bone density and trabecular pattern are normal.     Negative one view chest.    Cta Pulmonary W Contrast    Result Date: 10/7/2020  EXAMINATION: CTA OF THE CHEST 10/7/2020 2:48 pm TECHNIQUE: CTA of the chest was performed after the administration of intravenous contrast.  Multiplanar reformatted images are provided for review. MIP images are provided for review. Dose modulation, iterative reconstruction, and/or weight based adjustment of the mA/kV was utilized to reduce the radiation dose to as low as reasonably achievable. COMPARISON: September 17, 2020 HISTORY: ORDERING SYSTEM PROVIDED HISTORY: DVT, tachy, r/o PE TECHNOLOGIST PROVIDED HISTORY: Reason for exam:->DVT, tachy, r/o PE What reading provider will be dictating this exam?->CRC FINDINGS: There is a filling defect present within posterior right lower lobe segmental pulmonary artery. Subtle filling defect is present within lingular segmental pulmonary artery as seen on axial image number 125. No evidence of saddle embolism. The heart is normal in size. No pericardial effusion. There is no mediastinal or hilar lymphadenopathy. There are minimal areas of subsegmental atelectasis in lung bases. There is minimal new left pleural effusion. No evidence of pneumonia. There is no pneumothorax. View of the upper abdomen shows normal bilateral adrenal glands. Postsurgical changes with areas of subcutaneous emphysema are associated with subxiphoid anterior abdominal wall. Postsurgical changes are also present within upper abdomen below margin of the liver. 1.  Small filling defect is located within right lower lobe segmental pulmonary artery and small filling defect is present within lingular segmental pulmonary artery. These findings are related to chronic bilateral pulmonary emboli. Marion of pulmonary embolus has decreased compared to prior from September 17, 2020. 2.  New minimal left pleural effusion. 3. Foci of gas are associated with the subxiphoid abdominal wall as well as areas of inflammation located in right upper abdomen below margin of liver.  Clinical correlation recommended for recent surgery. Critical results were called by Dr. Ester Johnson to JOHN Gentile on 10/7/2020 at 15:06. Cta Pulmonary W Contrast    Result Date: 2020  Patient MRN:  [id number] : 1975 Age: 53 years Gender: Male Order Date:  2020 4:51 PM EXAM: CTA PULMONARY W CONTRAST NUMBER OF IMAGES:  480 INDICATION: Dizziness, cholecystostomy drain COMPARISON: None Technique: Low-dose CT  acquisition technique included one of following options; 1 . Automated exposure control, 2. Adjustment of MA and or KV according to patient's size or 3. Use of iterative reconstruction. Contiguous spiral images were obtained in the axial plane, following the administration of 90 mL of Isovue-370 intravenous contrast using CT angiographic protocol. Sagittal and coronal images were reconstructed from the axial plane acquisition. Addition MIP reconstructions were presented to aid in the interpretation of this study. Findings: The central pulmonary arterial tree shows acute emboli in the descending pulmonary arteries bilaterally, but no areas of regional oligemia, pleural effusion, or peripheral infarction are identified. There is no evidence of right heart strain. Lung window images show no evidence of organized pneumonia or mass lesions. Soft tissue window images show no evidence of mediastinal adenopathy, and the thoracic aorta shows no acute complications. Bone window images show no acute chest wall traumatic findings. Upper abdominal images show no evidence of acute visceral pathology. Diffuse fatty change of the liver is noted. The patient appears to be post cholecystectomy with a pigtail drain in the gallbladder fossa. The adrenals, kidneys, and included bowel structures are unremarkable for some mural thickening of small bowel in the left upper abdomen that could indicate mild colitis. Diverticuli are noted in the left hemicolon.      Bilateral central and dependent pulmonary emboli without peripheral infarcts No evidence of pneumonia, pulmonary mass lesions, or cardiac decompensation. Fatty change of the liver, and there is a pigtail drainage catheter in the gallbladder fossa. Upper abdominal bowel loops show some mural thickening, which is nonspecific, but could indicate enteritis. ALERT:  THIS IS AN ABNORMAL REPORT-bilateral lower lobe central acute pulmonary emboli without complicating right heart strain, pleural effusion, or pulmonary infarcts. Note: The emergency department was contacted telephonically by myself at the time of this dictation communicate findings    Us Dup Lower Extremity Left Jorge    Result Date: 10/7/2020  Patient MRN:  [id number] : 1975 Age: 53 years Gender: Male Order Date:  10/7/2020 2:16 PM EXAM: US DUP LOWER EXTREMITY LEFT JORGE NUMBER OF IMAGES:  25 INDICATION:  leg pain, swelling, r/o DVT leg pain, swelling, r/o DVT What reading provider will be dictating this exam?->[facility] COMPARISON: None There is evidence for deep venous thrombosis, which is occlusive in the left iliac common femoral and profunda superficial femoral and tibial peroneal trunk and the anterior and posterior tibial veins There is otherwise good compressibility, there is good augmentation, there is good color flow. There is evidence for a large deep venous thrombosis involving the entire left leg from the left iliac to the level of the trifurcation trifurcation veins below the knee ALERT:  THIS IS AN ABNORMAL REPORT     Xr Chest Abdomen Ng Placement    Result Date: 2020  Patient MRN: [id number] : 1975 Age:  53 years Gender: Male Order Date: 2020 5:35 AM Exam: XR CHEST ABDOMEN NG PLACEMENT Number of Images: 1 view Indication:   NG placement NG placement Comparison: Prior study from 2020 is available Findings: Study demonstrate lower chest upper abdomen demonstrated the nasogastric tube to be in satisfactory position with the tip in the body of the stomach.  There is an IVC filter noted to be in place. There is a right internal jugular catheter with tip in the superior vena cava. The abdomen gas pattern is nonspecific. Nasogastric tube in satisfactory position Other findings as described above         ASSESSMENT/PLAN :  54 yo male  BL PE s/p IVC  Hx GI bleed  S/p ex lap with cholecystectomy and R hemicolectomy with small bowel resection and side-to-side ileocolonic anastomosis with splenic thrombosis and infarction s/p splenectomy and omentectomy    - LLE DVT, extensive on US  - Likely provoked in nature due to immobility/stasis. Prophylactic dose but he has multiple risk factors  - Seen by vascular, s/p EKOS. Plan for take back to angio tomorrow  - Case discussed with Dr. Justo Ayala. We are both in agreement that given his prior surgical intervention, bleeding risk will be low and benefits outweigh risks in regard to DC on full dose oral AC with NOAC (Eliquis)  - Trend CBC  - Will follow    10/9/20  - Patient had repeat angio today which showed residual clot. The catheter was repositioned. currently on TPN heparin.   - Vascular surg following   - Follow-up angio scheduled for tomorrow  - Plts 477 and WBCs 16.8 trending down Hgb 7.6 stable     10/10/20  - Angiogram today  - WBCs 14.3 Hgb 8.5  Plts 523   - AC on DC as above    10/13/20  - S/p EKOS with vascular  - Hgb stable at 9.5  - Platelets elevated, likely reactive  - COVID19+  - OK to DC on systemic AC with Eliquis per my POV and follow with me in 2-3 weeks as outpatient    10/15/20  - Continued in isolation d/t COVID 19+ asymptomatic  - S/p EKOS now on Eliquis. No bleeding  - Hydroxyurea and ferrous sulfate for thrombocytosis and anemia.   - Today's plts 617 Hgb 9.3  - Monitor CBC as outpatient.  Thrombocytosis may be reactive  - OK to DC on systemic AC with Eliquis per my POV and follow with me in 2-3 weeks as outpatient      IGOR Alicea - CNP  Electronically signed 10/15/2020 at 11:57 AM  Patient seen and examined personally.  Note edited to reflect my updates  Didier Finley MD

## 2020-10-15 NOTE — PROGRESS NOTES
Pulmonary [address] [facility]                             Pulmonary Consult/Progress Note :  Follow covid    Subjective     Doing very well  No SOB   On RA   No chest pain   No events     Objective     VS: /62   Pulse 109   Temp 98.2 °F (36.8 °C) (Oral)   Resp 16   Ht 6' 1\" (1.854 m)   Wt 262 lb (118.8 kg)   SpO2 95%   BMI 34.57 kg/m²           I & O - 24hr:     Intake/Output Summary (Last 24 hours) at 10/14/2020 2236  Last data filed at 10/14/2020 2149  Gross per 24 hour   Intake 120 ml   Output 0 ml   Net 120 ml       Physical Exam:      Physical Exam:     General: AAO to person, place, time, in no acute distress,   Head and neck : PERRLA, EOMI . Sclera non icteric. Oropharynx : Clear  Neck: no JVD,  no adenopathy. Heart: Regular rate and regular rhythm, no murmur  Lungs: Clear to auscultation   Extremities: +LLE edema  Abdomen: Soft, non-tender; +surgical scars  Skin: coccyx wound, abdominal wound covered dressing  Neurologic:Cranial nerves grossly intact.  No focal motor or sensory deficits .       Labs     CBC with Differential:    Lab Results   Component Value Date    WBC 6.6 10/14/2020    RBC 3.18 10/14/2020    HGB 9.6 10/14/2020    HCT 30.9 10/14/2020     10/14/2020    MCV 89.0 10/14/2020    MCH 27.4 10/14/2020    MCHC 30.7 10/14/2020    RDW 17.9 10/14/2020    NRBC 0.9 08/01/2020    SEGSPCT 80 11/26/2013    LYMPHOPCT 13.6 10/12/2020    MONOPCT 14.9 10/12/2020    BASOPCT 0.5 10/12/2020    MONOSABS 1.32 10/12/2020    LYMPHSABS 1.21 10/12/2020    EOSABS 0.37 10/12/2020    BASOSABS 0.04 10/12/2020     CMP:    Lab Results   Component Value Date     10/14/2020    K 4.1 10/14/2020    K 3.5 10/08/2020     10/14/2020    CO2 21 10/14/2020    BUN 4 10/14/2020    CREATININE 0.6 10/14/2020    GFRAA >60 10/14/2020    LABGLOM >60 10/14/2020    GLUCOSE 121 10/14/2020    GLUCOSE 125 05/11/2012    PROT 6.0 10/07/2020    LABALBU 2.6 10/07/2020    LABALBU 4.8 05/11/2012    CALCIUM 8.1 10/14/2020    BILITOT <0.2 10/07/2020    ALKPHOS 171 10/07/2020    AST 12 10/07/2020    ALT 13 10/07/2020          Assessment and Plan     1. COVID 19+  - COVID-19 positive on 10/9  - Patient is not in acute respiratory distress and is currently asymptomatic so continue observation   - Afebrile since admission  - Currently on room air, saturating well 97-98%  - Continue to monitor respiratory status  - Continue isolation      2.  Left lower extremity DVT s/p EKOS on 10/8/2020  As per Vascular   On [facility]  Pulmonary&Critical Care Medicine   Director of [address] [place] [address] Director of [facility]    Ana Luciano

## 2020-10-15 NOTE — PROGRESS NOTES
Hospitalist Progress Note  [facility]MCU       Patient: Jennifer Montalvo  Unit/Bed: 4559/7537-G  YOB: 1975  MRN: [id number]  Acct: [de-identified]   AdmittingDiagnosis: DVT (deep vein thrombosis) in pregnancy [O22.30]  DVT (deep vein thrombosis) in pregnancy [O22.30]  Deep vein thrombosis (DVT) present on admission [facility]) [I82.409]  Deep vein thrombosis (DVT) present on admission [facility]) [I82.409]  Admit Date: 10/7/2020  Hospital Day: 8    Subjective:    Patient is having problems with leg pain and swelling    Patient Seen, Chart, Labs, Radiology studies, and Consults reviewed. Objective:   BP (!) 118/58   Pulse 108   Temp 97.9 °F (36.6 °C) (Oral)   Resp 16   Ht 6' 1\" (1.854 m)   Wt 262 lb (118.8 kg)   SpO2 98%   BMI 34.57 kg/m²       Intake/Output Summary (Last 24 hours) at 10/15/2020 1008  Last data filed at 10/14/2020 2149  Gross per 24 hour   Intake 0 ml   Output 0 ml   Net 0 ml       Physical Exam  General: AAO to person, place, time, in no acute distress, bulging; moderate habitus; BMI 34  Head and neck : PERRLA, EOMI . Sclera non icteric. Oropharynx : Clear  Neck: no JVD,  no adenopathy. Heart: Regular rate and regular rhythm, no murmur  Lungs: Clear to auscultation   Extremities: +LLE edema: Improved left calf is approximately 1 inch in diameter greater than right calf  Abdomen: Soft, non-tender; +surgical scars: Incision well-healed supra umbilicus dime size opening  Skin: coccyx wound, abdominal wound covered dressing  Neurologic:Cranial nerves grossly intact.  No focal motor or sensory deficits .     [place]:No  Drains:No  Central Line/port:No   EKG:Yes     Imaging:Yes   Ct Abdomen Pelvis W Iv Contrast Additional Contrast? None    Result Date: 2020  Patient MRN:  [id number] : 1975 Age: 53 years Gender: Male Order Date:  2020 4:51 PM EXAM: CT ABDOMEN PELVIS W IV CONTRAST NUMBER OF IMAGES \ views:  414 INDICATION: Dizziness, abdominal pain COMPARISON: Contemporaneous CTA chest study was performed, CT abdomen pelvis with contrast July 31, 7734 TECHNIQUE: Helical imaging was extended from the lower chest to the lower pelvis in conjunction with the intravenous administration of 90 mL of Isovue-370, and axial images were supplemented with sagittal and coronal MPR images. Low-dose CT  acquisition technique included one of following options; 1 . Automated exposure control, 2. Adjustment of MA and or KV according to patient's size or 3. Use of iterative reconstruction. FINDINGS: CHEST: Contemporaneous CTA imaging documented bilateral pulmonary emboli without right heart strain or acute pulmonary or pleural complications. LIVER: The liver is uniform in parenchymal density, and no focal lesions are identified. There is diffuse fatty change. GALLBLADDER AND BILIARY TREE: There is a pigtail drainage catheter in the gallbladder fossa, and it is uncertain if this is a post cholecystectomy drain or if this is a cholecystostomy associated with complete collapse of the gallbladder. There is no biliary ductal ectasia. SPLEEN: A circumscribed hypodense structure in the splenic bed could be a hypodense or infarcted spleen, but shows no evidence of extracapsular fluid or inflammatory change. The finding measures about 10 cm length by about 7 cm in thickness. It is uncertain if this represents residual following splenectomy, but there is no mention in the electronic record of splenectomy. . ADRENALS:  The adrenals are normal in appearance bilaterally. PANCREAS:The pancreas shows no evidence of acute inflammation or mass lesions. KIDNEYS/BLADDER: The kidneys show uniform cortical enhancement and no evidence of outflow obstruction. There is no perinephric inflammatory change. Ureters are similar in course and caliber, and the bladder is normal in appearance. APPENDIX:  Normal BOWEL:  There is no evidence of inflammation, obstruction, or perforation of enteric structures.  There is some fluid filled FINDINGS: The lungs are without acute focal process. There is no effusion or pneumothorax. The cardiomediastinal silhouette is without acute process. The osseous structures are without acute process. No acute process. Xr Chest Portable    Result Date: 2020  Patient MRN:  [id number] : 1975 Age: 53 years Gender: Male Order Date:  2020 11:58 PM TECHNIQUE/NUMBER OF IMAGES/COMPARISON/CLINICAL HISTORY: Chest AP 1 image one view Comparison 10/17/2020. Central venous line placed. FINDINGS: Right internal jugular central venous catheter tip in SVC. There is no pneumothorax on the right breast. Lungs are clear. Heart and small size, mediastinum unremarkable. There is no perihilar vascular congestion. No acute cardiopulmonary process. Xr Chest Portable    Result Date: 2020  Single frontal projection (one view) of the chest. COMPARISON: 2020 FINDINGS: The heart, lungs, mediastinum and regional skeleton are unremarkable. The costophrenic angles are sharp and clear. There is no evidence of mediastinal shift. The heart is not enlarged. There is no evidence of hilar adenopathy. Lungs are negative for evidence of acute airspace consolidation, effusion, atelectasis, pneumothorax, pathologic nodularity or interstitial thickening. Regional bone density and trabecular pattern are normal.     Negative one view chest.    Cta Pulmonary W Contrast    Result Date: 10/7/2020  EXAMINATION: CTA OF THE CHEST 10/7/2020 2:48 pm TECHNIQUE: CTA of the chest was performed after the administration of intravenous contrast.  Multiplanar reformatted images are provided for review. MIP images are provided for review. Dose modulation, iterative reconstruction, and/or weight based adjustment of the mA/kV was utilized to reduce the radiation dose to as low as reasonably achievable.  COMPARISON: 2020 HISTORY: ORDERING SYSTEM PROVIDED HISTORY: DVT, tachy, r/o PE TECHNOLOGIST PROVIDED HISTORY: Reason for exam:->DVT, tachy, r/o PE What reading provider will be dictating this exam?->CRC FINDINGS: There is a filling defect present within posterior right lower lobe segmental pulmonary artery. Subtle filling defect is present within lingular segmental pulmonary artery as seen on axial image number 125. No evidence of saddle embolism. The heart is normal in size. No pericardial effusion. There is no mediastinal or hilar lymphadenopathy. There are minimal areas of subsegmental atelectasis in lung bases. There is minimal new left pleural effusion. No evidence of pneumonia. There is no pneumothorax. View of the upper abdomen shows normal bilateral adrenal glands. Postsurgical changes with areas of subcutaneous emphysema are associated with subxiphoid anterior abdominal wall. Postsurgical changes are also present within upper abdomen below margin of the liver. 1.  Small filling defect is located within right lower lobe segmental pulmonary artery and small filling defect is present within lingular segmental pulmonary artery. These findings are related to chronic bilateral pulmonary emboli. Salix of pulmonary embolus has decreased compared to prior from 2020. 2.  New minimal left pleural effusion. 3. Foci of gas are associated with the subxiphoid abdominal wall as well as areas of inflammation located in right upper abdomen below margin of liver. Clinical correlation recommended for recent surgery. Critical results were called by Dr. Jono Nogueira to JOHN Richard on 10/7/2020 at 15:06. Cta Pulmonary W Contrast    Result Date: 2020  Patient MRN:  [id number] : 1975 Age: 53 years Gender: Male Order Date:  2020 4:51 PM EXAM: CTA PULMONARY W CONTRAST NUMBER OF IMAGES:  480 INDICATION: Dizziness, cholecystostomy drain COMPARISON: None Technique: Low-dose CT  acquisition technique included one of following options; 1 . Automated exposure control, 2.  Adjustment of MA and or KV according to patient's size or 3. Use of iterative reconstruction. Contiguous spiral images were obtained in the axial plane, following the administration of 90 mL of Isovue-370 intravenous contrast using CT angiographic protocol. Sagittal and coronal images were reconstructed from the axial plane acquisition. Addition MIP reconstructions were presented to aid in the interpretation of this study. Findings: The central pulmonary arterial tree shows acute emboli in the descending pulmonary arteries bilaterally, but no areas of regional oligemia, pleural effusion, or peripheral infarction are identified. There is no evidence of right heart strain. Lung window images show no evidence of organized pneumonia or mass lesions. Soft tissue window images show no evidence of mediastinal adenopathy, and the thoracic aorta shows no acute complications. Bone window images show no acute chest wall traumatic findings. Upper abdominal images show no evidence of acute visceral pathology. Diffuse fatty change of the liver is noted. The patient appears to be post cholecystectomy with a pigtail drain in the gallbladder fossa. The adrenals, kidneys, and included bowel structures are unremarkable for some mural thickening of small bowel in the left upper abdomen that could indicate mild colitis. Diverticuli are noted in the left hemicolon. Bilateral central and dependent pulmonary emboli without peripheral infarcts No evidence of pneumonia, pulmonary mass lesions, or cardiac decompensation. Fatty change of the liver, and there is a pigtail drainage catheter in the gallbladder fossa. Upper abdominal bowel loops show some mural thickening, which is nonspecific, but could indicate enteritis. ALERT:  THIS IS AN ABNORMAL REPORT-bilateral lower lobe central acute pulmonary emboli without complicating right heart strain, pleural effusion, or pulmonary infarcts.  Note: The emergency department was contacted telephonically by myself at the time of this dictation communicate findings    Us Dup Lower Extremity Left Jorge    Result Date: 10/7/2020  Patient MRN:  [id number] : 1975 Age: 53 years Gender: Male Order Date:  10/7/2020 2:16 PM EXAM: US DUP LOWER EXTREMITY LEFT JORGE NUMBER OF IMAGES:  25 INDICATION:  leg pain, swelling, r/o DVT leg pain, swelling, r/o DVT What reading provider will be dictating this exam?->[facility] COMPARISON: None There is evidence for deep venous thrombosis, which is occlusive in the left iliac common femoral and profunda superficial femoral and tibial peroneal trunk and the anterior and posterior tibial veins There is otherwise good compressibility, there is good augmentation, there is good color flow. There is evidence for a large deep venous thrombosis involving the entire left leg from the left iliac to the level of the trifurcation trifurcation veins below the knee ALERT:  THIS IS AN ABNORMAL REPORT     Xr Chest Abdomen Ng Placement    Result Date: 2020  Patient MRN: [id number] : 1975 Age:  53 years Gender: Male Order Date: 2020 5:35 AM Exam: XR CHEST ABDOMEN NG PLACEMENT Number of Images: 1 view Indication:   NG placement NG placement Comparison: Prior study from 2020 is available Findings: Study demonstrate lower chest upper abdomen demonstrated the nasogastric tube to be in satisfactory position with the tip in the body of the stomach. There is an IVC filter noted to be in place. There is a right internal jugular catheter with tip in the superior vena cava. The abdomen gas pattern is nonspecific. Nasogastric tube in satisfactory position Other findings as described above     Fluoro For Surgical Procedures    Result Date: 10/12/2020  EXAMINATION: SPOT FLUOROSCOPIC IMAGES 10/11/2020 1:16 pm TECHNIQUE: Fluoroscopy was provided by the radiology department for procedure. Radiologist was not present during examination. FLUOROSCOPY DOSE AND TYPE OR TIME AND EXPOSURES: 107.9 seconds.   73.0 seconds cumulative dose. COMPARISON: None HISTORY: ORDERING SYSTEM PROVIDED HISTORY: iliac stent revision TECHNOLOGIST PROVIDED HISTORY: Reason for exam:->iliac stent revision What reading provider will be dictating this exam?->CRC Intraprocedural imaging. Initial exam FINDINGS: 3 spot images of the pelvis were obtained. Spot views demonstrate images from a right iliac stent revision. Intraprocedural fluoroscopic spot images as above. See separate procedure report for more information.        Diet:  DIET GENERAL;  Dietary Nutrition Supplements: Low Calorie High Protein Supplement  Dietary Nutrition Supplements: Wound Healing Oral Supplement    Medications:    potassium chloride  20 mEq Oral BID WC    apixaban  5 mg Oral BID    magnesium sulfate  2 g Intravenous Once    sodium chloride flush  10 mL Intravenous 2 times per day    cholestyramine  1 packet Oral BID    cyclobenzaprine  10 mg Oral TID    DULoxetine  30 mg Oral Daily    fenofibrate  54 mg Oral Daily    ferrous sulfate  325 mg Oral BID    fluticasone  2 spray Nasal Daily    hydroxyurea  500 mg Oral BID    insulin glargine  25 Units Subcutaneous Daily    metoprolol tartrate  25 mg Oral BID    pantoprazole  40 mg Oral Daily    pravastatin  20 mg Oral Nightly    insulin lispro  0-6 Units Subcutaneous TID WC    insulin lispro  0-3 Units Subcutaneous Nightly     Continuous Infusions:   dextrose       PRNMeds:oxyCODONE **OR** [DISCONTINUED] oxyCODONE, glucose, dextrose, glucagon (rDNA), dextrose, sodium chloride flush, acetaminophen, ondansetron, loperamide, melatonin, [DISCONTINUED] acetaminophen **OR** acetaminophen, polyethylene glycol, promethazine **OR** [DISCONTINUED] ondansetron  DVT Prophylaxis:Apixaban and Encourage ambulation, low risk for DVT, no chemical or mechanical prophylaxis necessary    Data:  CBC:  Recent Labs     10/13/20  0408  10/14/20  0600 10/14/20  1506 10/15/20  0400   WBC 7.5  --  6.6  --  7.9   RBC 3.40*  --  3.18*  -- Service

## 2020-10-19 ENCOUNTER — TELEPHONE (OUTPATIENT)
Dept: CARDIOLOGY CLINIC | Age: 45
End: 2020-10-19

## 2020-10-19 NOTE — TELEPHONE ENCOUNTER
Spoke to Marianna at [facility] [phone]. He was covid 19 positive on Oct 9, 2020. Has a post op appt on Oct. 20, 2020.  We will cancel his appt and they will call to reschedule after 2 neg tests or 6 weeks has past.

## 2020-11-05 ENCOUNTER — TELEPHONE (OUTPATIENT)
Dept: SURGERY | Age: 45
End: 2020-11-05

## 2020-11-05 NOTE — TELEPHONE ENCOUNTER
Received a call from [address] who stated that there is moderate amount yellow drainage coming out from his lower incision site when he lays on bed. No fever or chills. The patient is doing ok. She would like to get advice from Dr. Izzy Hampton. Spoke with Dr. Izzy Hampton, per Dr. Izzy Hampton, it's possibility that there is some fluid collection underneath the incision site. Let it drain and call if symptoms get worsen. Notified Keyur Garcia. She verbalized understanding.   Electronically signed by Chriss Avendaño on 11/5/20 at 9:37 AM EST

## 2020-11-10 ENCOUNTER — OFFICE VISIT (OUTPATIENT)
Dept: VASCULAR SURGERY | Age: 45
End: 2020-11-10

## 2020-11-10 PROCEDURE — 99024 POSTOP FOLLOW-UP VISIT: CPT | Performed by: SURGERY

## 2020-11-10 RX ORDER — HYDROCODONE BITARTRATE AND ACETAMINOPHEN 5; 325 MG/1; MG/1
1 TABLET ORAL EVERY 6 HOURS PRN
Status: ON HOLD | COMMUNITY
End: 2020-11-20 | Stop reason: HOSPADM

## 2020-11-10 NOTE — PROGRESS NOTES
Vascular Surgery Progress Note    Chief Complaint   Patient presents with    Post-Op Check     s/p venogram, ankles are swollen. Patient returns for post operative evaluation status post catheter directed DVT lysis of the iliac veins and inferior vena cava including the filter. The patient denies any unexpected problems since hospital discharge. He has some residual swelling of his legs. Procedure Laterality Date    COLONOSCOPY N/A 9/5/2020    COLONOSCOPY WITH BIOPSY performed by Majo Mata MD at [address] PTC NEW ACCESS  8/3/2020    CT PTC NEW ACCESS 8/3/2020 [facility] CT    FOOT SURGERY Right 1985    to treat shattered bones    HERNIA REPAIR  2001    DOUBLE HERNIA    HERNIA REPAIR Right 12/9/2019    LAPAROSCOPIC RIGHT INGUINAL HERNIA REPAIR, MESH 10x15 cm PLACEMENT performed by Rachel Atkinson MD at [facility] Left 4/11/2016    ILIAC ARTERY STENT INSERTION N/A 10/11/2020    S/P ILIAC STENT PLACEMENT VISUALIZATION performed by Odilon Beltrán MD at [facility] N/A 9/18/2020    LAPAROTOMY EXPLORATORY, CHOLECYSTECTOMY, BOWEL RESECTION, right darian colectomy, partial omentectomy, and splenectomy performed by Majo Mata MD at [address] FLX DX W/COLLJ [address] 1978 PFRMD N/A 5/7/2018    COLONOSCOPY DIAGNOSTIC performed by Homero Roe MD at [address] [facility] Left 2014    Dr. Luiz Knight ARTHROSCOPY Left 11 21 14    SHOULDER SURGERY  2001 &2007    RIGHT AND LEFT repair of tears    TOTAL HIP ARTHROPLASTY Right 10/31/2016    Total right hip  Valeria Dougherty MD    UPPER GASTROINTESTINAL ENDOSCOPY N/A 9/4/2020    EGD DIAGNOSTIC ONLY performed by Marcial Cai MD at [facility] 1  2007    LEFT WRIST       Physical Exam:  The incision(s) are healing without evidence of infection. Heart rhythm is regular.  Mild edema of bilateral lower extremities. Right      Left   Brachial     Radial     Femoral     Popliteal     Dorsalis Pedis     Posterior Tibial     (3=normal, 2=diminished, 1=barely palpable, 4=widened)    Problem List Items Addressed This Visit     Acute thrombosis of inferior vena cava ([address] - Primary        The patient is tolerating anticoagulation with Eliquis. Given his history, I would recommend lifelong anticoagulation unless otherwise contraindicated. I did not schedule him for a follow-up appointment but asked him to call with any changes.

## 2020-11-16 ENCOUNTER — OFFICE VISIT (OUTPATIENT)
Dept: CARDIOLOGY CLINIC | Age: 45
End: 2020-11-16
Payer: COMMERCIAL

## 2020-11-16 VITALS
SYSTOLIC BLOOD PRESSURE: 100 MMHG | WEIGHT: 240 LBS | BODY MASS INDEX: 31.81 KG/M2 | DIASTOLIC BLOOD PRESSURE: 64 MMHG | HEIGHT: 73 IN | RESPIRATION RATE: 16 BRPM

## 2020-11-16 PROCEDURE — 99214 OFFICE O/P EST MOD 30 MIN: CPT | Performed by: INTERNAL MEDICINE

## 2020-11-16 PROCEDURE — 93000 ELECTROCARDIOGRAM COMPLETE: CPT | Performed by: INTERNAL MEDICINE

## 2020-11-16 NOTE — PROGRESS NOTES
OUTPATIENT CARDIOLOGY FOLLOW-UP    Name: Andres Li    Age: 50 y.o. Primary Care Physician: Cristina Garcia, APRN - CNP    Date of Service: 2020    Chief Complaint:   Chief Complaint   Patient presents with    Follow-Up from Hospital        Interim History:   Patient with complex history who initially saw in consultation 2020 for tachycardia. Had a prolonged hospitalization in 2020 at [facility], sounds like he may have had cardiac arrest as he states he  several times on the way to the hospital, and had a course complicated by bilateral pulmonary embolism, splenic infarction, sepsis, splenic vein thrombosis and decubitus ulcer. He was admitted in 2020 for sepsis and splenic abscess. Tachycardia at that time was felt to be due to sepsis. 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 He has since had recurrent extensive venous thromboembolic events requiring repeat IVC filter and catheter lysis in  complicated by asymptomatic COVID-19 infection. He has a right shoulder and arm injury and has right wrist drop from his initial hospitalization in [place] in . He is currently residing in a nursing facility. He is presently tolerating apixaban therapy. He denies chest pain, shortness of breath, dizziness lightheadedness or palpitations. He has resolving lower extremity edema.     Review of Systems:   Negative except as described above    Past Medical History:  Past Medical History:   Diagnosis Date    Accident 2019    stepped on nail rt ft about 1 month ago- healed per patient    Acute thrombosis of inferior vena cava ([facility] 10/10/2020    SIMÓN (acute kidney injury) ([facility] [address] 2008 apx    kidney bruised due to fall / Geraldine Smiles    Allergic rhinitis     Chronic back pain     Depression     Diabetes mellitus (HCC)     Difficulty sleeping     at times    Displacement of lumbar intervertebral disc without myelopathy     Fibromyalgia     Fractured rib     2008 / healed    H/O seasonal allergies     Head injury 1980'S apx    no residual s/s    Hyperlipidemia     Hypertension     Obesity (BMI 35.0-39.9 without comorbidity)     bmi 39.2  weight 296 #    Osteoarthritis     Thoracic or lumbosacral neuritis or radiculitis, unspecified        Past Surgical History:  Past Surgical History:   Procedure Laterality Date    COLONOSCOPY N/A 9/5/2020    COLONOSCOPY WITH BIOPSY performed by Ngozi Martinez MD at [address] [facility] CT PTC NEW ACCESS  8/3/2020    CT PTC NEW ACCESS 8/3/2020 [facility] CT    FOOT SURGERY Right 1985    to treat shattered bones    HERNIA REPAIR  2001    DOUBLE HERNIA    HERNIA REPAIR Right 12/9/2019    LAPAROSCOPIC RIGHT INGUINAL HERNIA REPAIR, MESH 10x15 cm PLACEMENT performed by Nicky Wesley MD at [address] [facility] Left 4/11/2016    ILIAC ARTERY STENT INSERTION N/A 10/11/2020    S/P ILIAC STENT PLACEMENT VISUALIZATION performed by Gilbert Romero MD at [place] [address] N/A 9/18/2020    LAPAROTOMY EXPLORATORY, CHOLECYSTECTOMY, BOWEL RESECTION, right darian colectomy, partial omentectomy, and splenectomy performed by Ngozi Martinez MD at [address] FLX DX W/COLLIRENE Queen 1978 PFRMD N/A 5/7/2018    COLONOSCOPY DIAGNOSTIC performed by Edilson Al MD at [address] [facility] Left 2014    Dr. Ama Guillory Left 11 21 14    SHOULDER SURGERY  2001 &2007    RIGHT AND LEFT repair of tears    TOTAL HIP ARTHROPLASTY Right 10/31/2016    Total right hip  Abhishek Villanueva MD    UPPER GASTROINTESTINAL ENDOSCOPY N/A 9/4/2020    EGD DIAGNOSTIC ONLY performed by Leah Lira MD at [place] [address] atraumatic  Eyes: EOMI, no conjunctival erythema  ENMT: No pharyngeal erythema, MMM, no rhinorrhea  Neck: Supple, no elevated JVP, no carotid bruits  Lungs: Clear to auscultation bilaterally. No wheezes, rales, or rhonchi. Cardiac: Regular rate and rhythm, +S1S2, no murmurs apparent. Healing abdominal midline incision  Abdomen: Soft, nontender, +bowel sounds  Extremities: Moves all extremities x 4, no lower extremity edema.   Right arm in a splint with a wrist drop noted  Neurologic: No focal motor deficits apparent, normal mood and affect, alert and oriented x 3  Peripheral Pulses: Intact posterior tibial pulses bilaterally    Laboratory Tests:  Lab Results   Component Value Date    CREATININE 0.6 (L) 10/15/2020    BUN 5 (L) 10/15/2020     10/15/2020    K 4.5 10/15/2020     10/15/2020    CO2 23 10/15/2020     Lab Results   Component Value Date    MG 1.5 10/11/2020     Lab Results   Component Value Date    WBC 7.9 10/15/2020    HGB 9.3 (L) 10/15/2020    HCT 30.4 (L) 10/15/2020    MCV 89.9 10/15/2020     (H) 10/15/2020     Lab Results   Component Value Date    ALT 13 10/07/2020    AST 12 10/07/2020    ALKPHOS 171 (H) 10/07/2020    BILITOT <0.2 10/07/2020     Lab Results   Component Value Date    CKTOTAL 120 11/26/2013    CKMB 1.4 11/26/2013    TROPONINI <0.01 10/07/2020    TROPONINI 0.03 09/17/2020    TROPONINI <0.01 08/06/2020     Lab Results   Component Value Date    INR 1.4 10/11/2020    INR 1.4 10/10/2020    INR 1.3 10/07/2020    PROTIME 16.2 (H) 10/11/2020    PROTIME 16.3 (H) 10/10/2020    PROTIME 14.2 (H) 10/07/2020     Lab Results   Component Value Date    TSH 0.833 09/18/2020     Lab Results   Component Value Date    LABA1C 5.5 09/18/2020     No results found for: EAG  Lab Results   Component Value Date    CHOL 94 09/18/2020    CHOL 148 06/01/2020    CHOL 208 (H) 10/21/2019     Lab Results   Component Value Date    TRIG 151 (H) 09/18/2020    TRIG 252 (H) 06/01/2020    TRIG 260 (H) 10/21/2019     Lab Results   Component Value Date    HDL 33 09/18/2020    HDL 35 06/01/2020    HDL 36 10/21/2019     Lab Results   Component Value Date    LDLCALC 31 09/18/2020    LDLCALC 63 06/01/2020    LDLCALC 120 (H) 10/21/2019     Lab Results   Component Value Date    LABVLDL 30 09/18/2020    LABVLDL 50 06/01/2020    LABVLDL 52 10/21/2019     No results found for: CHOLHDLRATIO  No results for input(s): PROBNP in the last 72 hours. Cardiac Tests:  ECG: Sinus rhythm 91 bpm.  Normal axis normal intervals. Nonspecific T wave changes    Echocardiogram:   Transthoracic echo 8/3/2020     Summary   Technically difficult study - limited visualization. Micro-bubble contrast injected to enhance left ventricular visualization. Normal left ventricular size and systolic function. Ejection fraction is visually estimated at 60-65%. Normal diastolic function. No regional wall motion abnormalities seen. Mild left ventricular concentric hypertrophy noted. Abnormal LV septal motion consistent with conduction disorder. Normal right ventricular size and function. No significant valvular abnormalities. Stress test:      Cardiac catheterization:     Orders Placed This Encounter   Procedures    EKG 12 lead        Requested Prescriptions      No prescriptions requested or ordered in this encounter        ASSESSMENT / PLAN:  1. Sinus tachycardia due to sepsis, possible SVT, resolved  2. Recurrent DVT/PE/VTE, status post IVC filters and anticoagulated with apixaban  3. GI bleeding status post right hemicolectomy, cholecystectomy, small bowel resection, ileocolonic anastomosis, splenectomy and omentectomy  4. Splenic infarction, splenic vein thrombosis  5. Sepsis due to abdominal infection/abscess  6. Thrombocytosis, improved after splenectomy, on hydroxyurea  7. Hypertension, running low  8. Type 2 diabetes  9. Hyperlipidemia  10. Sacral decubitus  11. Right arm injury/right wrist drop  12.  Asymptomatic COVID-19 infection 10/2020  13. Anemia hemoglobin 9.3    Recommendations:  Review of his records from the nursing home show he is taking both metoprolol tartrate 25 twice daily and succinate 25 once daily. He otherwise appears stable from a cardiac standpoint despite a very complex medical history over the past 6 months. No evidence of recurrent arrhythmias. · Discontinue metoprolol tartrate  · Continue metoprolol succinate 25 mg once daily  · Continue lifelong apixaban  · Continue statin  · Aggressive risk factor modification  · Increase physical activity, PT as tolerated  · Follow-up in 6 months or sooner if need arises    The patient's current medication list, allergies, problem list and results of all previously ordered testing were reviewed at today's visit.     Arlen Villanueva MD   [facility]) Cardiology

## 2020-11-18 ENCOUNTER — HOSPITAL ENCOUNTER (OUTPATIENT)
Age: 45
Setting detail: OBSERVATION
Discharge: NURSING, REHAB OR LONG-TERM CARE | End: 2020-11-20
Attending: EMERGENCY MEDICINE | Admitting: FAMILY MEDICINE
Payer: COMMERCIAL

## 2020-11-18 ENCOUNTER — APPOINTMENT (OUTPATIENT)
Dept: GENERAL RADIOLOGY | Age: 45
End: 2020-11-18
Payer: COMMERCIAL

## 2020-11-18 ENCOUNTER — APPOINTMENT (OUTPATIENT)
Dept: ULTRASOUND IMAGING | Age: 45
End: 2020-11-18
Payer: COMMERCIAL

## 2020-11-18 ENCOUNTER — APPOINTMENT (OUTPATIENT)
Dept: CT IMAGING | Age: 45
End: 2020-11-18
Payer: COMMERCIAL

## 2020-11-18 PROBLEM — L03.90 CELLULITIS: Status: ACTIVE | Noted: 2020-11-18

## 2020-11-18 LAB
ALBUMIN SERPL-MCNC: 3.5 G/DL (ref 3.5–5.2)
ALP BLD-CCNC: 142 U/L (ref 40–129)
ALT SERPL-CCNC: 26 U/L (ref 0–40)
ANION GAP SERPL CALCULATED.3IONS-SCNC: 9 MMOL/L (ref 7–16)
ANISOCYTOSIS: ABNORMAL
APTT: 31.3 SEC (ref 24.5–35.1)
AST SERPL-CCNC: 27 U/L (ref 0–39)
BASOPHILS ABSOLUTE: 0.1 E9/L (ref 0–0.2)
BASOPHILS RELATIVE PERCENT: 0.9 % (ref 0–2)
BILIRUB SERPL-MCNC: <0.2 MG/DL (ref 0–1.2)
BUN BLDV-MCNC: 7 MG/DL (ref 6–20)
CALCIUM SERPL-MCNC: 8.1 MG/DL (ref 8.6–10.2)
CHLORIDE BLD-SCNC: 107 MMOL/L (ref 98–107)
CO2: 23 MMOL/L (ref 22–29)
CREAT SERPL-MCNC: 0.8 MG/DL (ref 0.7–1.2)
EOSINOPHILS ABSOLUTE: 0.39 E9/L (ref 0.05–0.5)
EOSINOPHILS RELATIVE PERCENT: 3.5 % (ref 0–6)
GFR AFRICAN AMERICAN: >60
GFR NON-AFRICAN AMERICAN: >60 ML/MIN/1.73
GLUCOSE BLD-MCNC: 119 MG/DL (ref 74–99)
HCT VFR BLD CALC: 31.4 % (ref 37–54)
HEMOGLOBIN: 9.8 G/DL (ref 12.5–16.5)
HYPOCHROMIA: ABNORMAL
INR BLD: 1.1
LYMPHOCYTES ABSOLUTE: 2.66 E9/L (ref 1.5–4)
LYMPHOCYTES RELATIVE PERCENT: 23.7 % (ref 20–42)
MCH RBC QN AUTO: 31 PG (ref 26–35)
MCHC RBC AUTO-ENTMCNC: 31.2 % (ref 32–34.5)
MCV RBC AUTO: 99.4 FL (ref 80–99.9)
METER GLUCOSE: 122 MG/DL (ref 74–99)
MONOCYTES ABSOLUTE: 0.56 E9/L (ref 0.1–0.95)
MONOCYTES RELATIVE PERCENT: 5.3 % (ref 2–12)
NEUTROPHILS ABSOLUTE: 7.44 E9/L (ref 1.8–7.3)
NEUTROPHILS RELATIVE PERCENT: 66.7 % (ref 43–80)
PDW BLD-RTO: 24.6 FL (ref 11.5–15)
PLATELET # BLD: 608 E9/L (ref 130–450)
PMV BLD AUTO: 8.7 FL (ref 7–12)
POIKILOCYTES: ABNORMAL
POLYCHROMASIA: ABNORMAL
POTASSIUM SERPL-SCNC: 4 MMOL/L (ref 3.5–5)
PRO-BNP: 80 PG/ML (ref 0–125)
PROCALCITONIN: 0.06 NG/ML (ref 0–0.08)
PROTHROMBIN TIME: 12.8 SEC (ref 9.3–12.4)
RBC # BLD: 3.16 E12/L (ref 3.8–5.8)
SARS-COV-2, NAAT: NOT DETECTED
SCHISTOCYTES: ABNORMAL
SODIUM BLD-SCNC: 139 MMOL/L (ref 132–146)
TARGET CELLS: ABNORMAL
TOTAL PROTEIN: 6.6 G/DL (ref 6.4–8.3)
TROPONIN: <0.01 NG/ML (ref 0–0.03)
WBC # BLD: 11.1 E9/L (ref 4.5–11.5)

## 2020-11-18 PROCEDURE — U0002 COVID-19 LAB TEST NON-CDC: HCPCS

## 2020-11-18 PROCEDURE — 80053 COMPREHEN METABOLIC PANEL: CPT

## 2020-11-18 PROCEDURE — 2580000003 HC RX 258: Performed by: RADIOLOGY

## 2020-11-18 PROCEDURE — 99285 EMERGENCY DEPT VISIT HI MDM: CPT

## 2020-11-18 PROCEDURE — 84484 ASSAY OF TROPONIN QUANT: CPT

## 2020-11-18 PROCEDURE — 82962 GLUCOSE BLOOD TEST: CPT

## 2020-11-18 PROCEDURE — 71275 CT ANGIOGRAPHY CHEST: CPT

## 2020-11-18 PROCEDURE — 6360000004 HC RX CONTRAST MEDICATION: Performed by: RADIOLOGY

## 2020-11-18 PROCEDURE — 71045 X-RAY EXAM CHEST 1 VIEW: CPT

## 2020-11-18 PROCEDURE — 2580000003 HC RX 258: Performed by: FAMILY MEDICINE

## 2020-11-18 PROCEDURE — 85730 THROMBOPLASTIN TIME PARTIAL: CPT

## 2020-11-18 PROCEDURE — 96374 THER/PROPH/DIAG INJ IV PUSH: CPT

## 2020-11-18 PROCEDURE — 96375 TX/PRO/DX INJ NEW DRUG ADDON: CPT

## 2020-11-18 PROCEDURE — 84145 PROCALCITONIN (PCT): CPT

## 2020-11-18 PROCEDURE — 6360000002 HC RX W HCPCS: Performed by: PHYSICIAN ASSISTANT

## 2020-11-18 PROCEDURE — 85610 PROTHROMBIN TIME: CPT

## 2020-11-18 PROCEDURE — 6370000000 HC RX 637 (ALT 250 FOR IP): Performed by: FAMILY MEDICINE

## 2020-11-18 PROCEDURE — 6370000000 HC RX 637 (ALT 250 FOR IP): Performed by: PHYSICIAN ASSISTANT

## 2020-11-18 PROCEDURE — G0378 HOSPITAL OBSERVATION PER HR: HCPCS

## 2020-11-18 PROCEDURE — 2580000003 HC RX 258: Performed by: PHYSICIAN ASSISTANT

## 2020-11-18 PROCEDURE — 85025 COMPLETE CBC W/AUTO DIFF WBC: CPT

## 2020-11-18 PROCEDURE — 93005 ELECTROCARDIOGRAM TRACING: CPT | Performed by: PHYSICIAN ASSISTANT

## 2020-11-18 PROCEDURE — 6360000002 HC RX W HCPCS: Performed by: FAMILY MEDICINE

## 2020-11-18 PROCEDURE — 93970 EXTREMITY STUDY: CPT | Performed by: RADIOLOGY

## 2020-11-18 PROCEDURE — 93970 EXTREMITY STUDY: CPT

## 2020-11-18 PROCEDURE — 36415 COLL VENOUS BLD VENIPUNCTURE: CPT

## 2020-11-18 PROCEDURE — 83880 ASSAY OF NATRIURETIC PEPTIDE: CPT

## 2020-11-18 RX ORDER — NICOTINE POLACRILEX 4 MG
15 LOZENGE BUCCAL PRN
Status: DISCONTINUED | OUTPATIENT
Start: 2020-11-18 | End: 2020-11-20 | Stop reason: HOSPADM

## 2020-11-18 RX ORDER — INSULIN GLARGINE 100 [IU]/ML
25 INJECTION, SOLUTION SUBCUTANEOUS DAILY
Status: DISCONTINUED | OUTPATIENT
Start: 2020-11-19 | End: 2020-11-20 | Stop reason: HOSPADM

## 2020-11-18 RX ORDER — CHOLESTYRAMINE 4 G/9G
1 POWDER, FOR SUSPENSION ORAL 2 TIMES DAILY
Status: DISCONTINUED | OUTPATIENT
Start: 2020-11-18 | End: 2020-11-20 | Stop reason: HOSPADM

## 2020-11-18 RX ORDER — DULOXETIN HYDROCHLORIDE 30 MG/1
30 CAPSULE, DELAYED RELEASE ORAL DAILY
Status: DISCONTINUED | OUTPATIENT
Start: 2020-11-18 | End: 2020-11-20 | Stop reason: HOSPADM

## 2020-11-18 RX ORDER — PANTOPRAZOLE SODIUM 40 MG/1
40 TABLET, DELAYED RELEASE ORAL DAILY
Status: DISCONTINUED | OUTPATIENT
Start: 2020-11-18 | End: 2020-11-20 | Stop reason: HOSPADM

## 2020-11-18 RX ORDER — FENOFIBRATE 54 MG/1
54 TABLET ORAL DAILY
Status: DISCONTINUED | OUTPATIENT
Start: 2020-11-18 | End: 2020-11-20 | Stop reason: HOSPADM

## 2020-11-18 RX ORDER — MAGNESIUM SULFATE IN WATER 40 MG/ML
2 INJECTION, SOLUTION INTRAVENOUS PRN
Status: DISCONTINUED | OUTPATIENT
Start: 2020-11-18 | End: 2020-11-20 | Stop reason: HOSPADM

## 2020-11-18 RX ORDER — ONDANSETRON 2 MG/ML
4 INJECTION INTRAMUSCULAR; INTRAVENOUS ONCE
Status: DISCONTINUED | OUTPATIENT
Start: 2020-11-18 | End: 2020-11-18

## 2020-11-18 RX ORDER — ONDANSETRON 2 MG/ML
4 INJECTION INTRAMUSCULAR; INTRAVENOUS EVERY 6 HOURS PRN
Status: DISCONTINUED | OUTPATIENT
Start: 2020-11-18 | End: 2020-11-20 | Stop reason: HOSPADM

## 2020-11-18 RX ORDER — CYCLOBENZAPRINE HCL 10 MG
10 TABLET ORAL 3 TIMES DAILY
Status: DISCONTINUED | OUTPATIENT
Start: 2020-11-18 | End: 2020-11-20 | Stop reason: HOSPADM

## 2020-11-18 RX ORDER — METOPROLOL SUCCINATE 25 MG/1
25 TABLET, EXTENDED RELEASE ORAL DAILY
Status: DISCONTINUED | OUTPATIENT
Start: 2020-11-18 | End: 2020-11-20 | Stop reason: HOSPADM

## 2020-11-18 RX ORDER — POTASSIUM CHLORIDE 20 MEQ/1
40 TABLET, EXTENDED RELEASE ORAL PRN
Status: DISCONTINUED | OUTPATIENT
Start: 2020-11-18 | End: 2020-11-20 | Stop reason: HOSPADM

## 2020-11-18 RX ORDER — FENTANYL CITRATE 50 UG/ML
25 INJECTION, SOLUTION INTRAMUSCULAR; INTRAVENOUS ONCE
Status: COMPLETED | OUTPATIENT
Start: 2020-11-18 | End: 2020-11-18

## 2020-11-18 RX ORDER — SODIUM CHLORIDE 0.9 % (FLUSH) 0.9 %
10 SYRINGE (ML) INJECTION PRN
Status: DISCONTINUED | OUTPATIENT
Start: 2020-11-18 | End: 2020-11-18

## 2020-11-18 RX ORDER — PRAVASTATIN SODIUM 20 MG
20 TABLET ORAL NIGHTLY
Status: DISCONTINUED | OUTPATIENT
Start: 2020-11-18 | End: 2020-11-20 | Stop reason: HOSPADM

## 2020-11-18 RX ORDER — PROMETHAZINE HYDROCHLORIDE 25 MG/1
12.5 TABLET ORAL EVERY 6 HOURS PRN
Status: DISCONTINUED | OUTPATIENT
Start: 2020-11-18 | End: 2020-11-20 | Stop reason: HOSPADM

## 2020-11-18 RX ORDER — FLUTICASONE PROPIONATE 50 MCG
2 SPRAY, SUSPENSION (ML) NASAL DAILY
Status: DISCONTINUED | OUTPATIENT
Start: 2020-11-18 | End: 2020-11-20 | Stop reason: HOSPADM

## 2020-11-18 RX ORDER — HYDROCODONE BITARTRATE AND ACETAMINOPHEN 5; 325 MG/1; MG/1
1 TABLET ORAL EVERY 6 HOURS PRN
Status: DISCONTINUED | OUTPATIENT
Start: 2020-11-18 | End: 2020-11-19

## 2020-11-18 RX ORDER — OXYCODONE HYDROCHLORIDE AND ACETAMINOPHEN 5; 325 MG/1; MG/1
1 TABLET ORAL ONCE
Status: COMPLETED | OUTPATIENT
Start: 2020-11-18 | End: 2020-11-18

## 2020-11-18 RX ORDER — POTASSIUM CHLORIDE 7.45 MG/ML
10 INJECTION INTRAVENOUS PRN
Status: DISCONTINUED | OUTPATIENT
Start: 2020-11-18 | End: 2020-11-20 | Stop reason: HOSPADM

## 2020-11-18 RX ORDER — MORPHINE SULFATE 4 MG/ML
4 INJECTION, SOLUTION INTRAMUSCULAR; INTRAVENOUS ONCE
Status: COMPLETED | OUTPATIENT
Start: 2020-11-18 | End: 2020-11-18

## 2020-11-18 RX ORDER — 0.9 % SODIUM CHLORIDE 0.9 %
1000 INTRAVENOUS SOLUTION INTRAVENOUS ONCE
Status: COMPLETED | OUTPATIENT
Start: 2020-11-18 | End: 2020-11-18

## 2020-11-18 RX ORDER — ACETAMINOPHEN 650 MG/1
650 SUPPOSITORY RECTAL EVERY 6 HOURS PRN
Status: DISCONTINUED | OUTPATIENT
Start: 2020-11-18 | End: 2020-11-20 | Stop reason: HOSPADM

## 2020-11-18 RX ORDER — DEXTROSE MONOHYDRATE 50 MG/ML
100 INJECTION, SOLUTION INTRAVENOUS PRN
Status: DISCONTINUED | OUTPATIENT
Start: 2020-11-18 | End: 2020-11-20 | Stop reason: HOSPADM

## 2020-11-18 RX ORDER — POLYETHYLENE GLYCOL 3350 17 G/17G
17 POWDER, FOR SOLUTION ORAL DAILY PRN
Status: DISCONTINUED | OUTPATIENT
Start: 2020-11-18 | End: 2020-11-20 | Stop reason: HOSPADM

## 2020-11-18 RX ORDER — LANOLIN ALCOHOL/MO/W.PET/CERES
3 CREAM (GRAM) TOPICAL NIGHTLY PRN
Status: DISCONTINUED | OUTPATIENT
Start: 2020-11-18 | End: 2020-11-20 | Stop reason: HOSPADM

## 2020-11-18 RX ORDER — SODIUM CHLORIDE 0.9 % (FLUSH) 0.9 %
10 SYRINGE (ML) INJECTION EVERY 12 HOURS SCHEDULED
Status: DISCONTINUED | OUTPATIENT
Start: 2020-11-18 | End: 2020-11-20 | Stop reason: HOSPADM

## 2020-11-18 RX ORDER — POTASSIUM CHLORIDE 20 MEQ/1
20 TABLET, EXTENDED RELEASE ORAL 2 TIMES DAILY WITH MEALS
Status: DISCONTINUED | OUTPATIENT
Start: 2020-11-18 | End: 2020-11-20 | Stop reason: HOSPADM

## 2020-11-18 RX ORDER — HYDROXYUREA 500 MG/1
500 CAPSULE ORAL 2 TIMES DAILY
Status: DISCONTINUED | OUTPATIENT
Start: 2020-11-18 | End: 2020-11-20 | Stop reason: HOSPADM

## 2020-11-18 RX ORDER — FERROUS SULFATE 325(65) MG
325 TABLET ORAL 2 TIMES DAILY
Status: DISCONTINUED | OUTPATIENT
Start: 2020-11-18 | End: 2020-11-20 | Stop reason: HOSPADM

## 2020-11-18 RX ORDER — ACETAMINOPHEN 325 MG/1
650 TABLET ORAL EVERY 6 HOURS PRN
Status: DISCONTINUED | OUTPATIENT
Start: 2020-11-18 | End: 2020-11-20 | Stop reason: HOSPADM

## 2020-11-18 RX ORDER — FUROSEMIDE 10 MG/ML
20 INJECTION INTRAMUSCULAR; INTRAVENOUS 2 TIMES DAILY
Status: DISCONTINUED | OUTPATIENT
Start: 2020-11-18 | End: 2020-11-20 | Stop reason: HOSPADM

## 2020-11-18 RX ORDER — LISINOPRIL 10 MG/1
5 TABLET ORAL DAILY
Status: DISCONTINUED | OUTPATIENT
Start: 2020-11-18 | End: 2020-11-20 | Stop reason: HOSPADM

## 2020-11-18 RX ORDER — DEXTROSE MONOHYDRATE 25 G/50ML
12.5 INJECTION, SOLUTION INTRAVENOUS PRN
Status: DISCONTINUED | OUTPATIENT
Start: 2020-11-18 | End: 2020-11-20 | Stop reason: HOSPADM

## 2020-11-18 RX ORDER — PREGABALIN 150 MG/1
300 CAPSULE ORAL 2 TIMES DAILY
Status: DISCONTINUED | OUTPATIENT
Start: 2020-11-18 | End: 2020-11-20 | Stop reason: HOSPADM

## 2020-11-18 RX ORDER — SODIUM CHLORIDE 0.9 % (FLUSH) 0.9 %
10 SYRINGE (ML) INJECTION PRN
Status: DISCONTINUED | OUTPATIENT
Start: 2020-11-18 | End: 2020-11-20 | Stop reason: HOSPADM

## 2020-11-18 RX ADMIN — METOPROLOL SUCCINATE 25 MG: 25 TABLET, EXTENDED RELEASE ORAL at 21:49

## 2020-11-18 RX ADMIN — FENOFIBRATE 54 MG: 54 TABLET ORAL at 21:54

## 2020-11-18 RX ADMIN — FERROUS SULFATE TAB 325 MG (65 MG ELEMENTAL FE) 325 MG: 325 (65 FE) TAB at 21:50

## 2020-11-18 RX ADMIN — PANTOPRAZOLE SODIUM 40 MG: 40 TABLET, DELAYED RELEASE ORAL at 21:50

## 2020-11-18 RX ADMIN — APIXABAN 5 MG: 5 TABLET, FILM COATED ORAL at 21:48

## 2020-11-18 RX ADMIN — IOPAMIDOL 75 ML: 755 INJECTION, SOLUTION INTRAVENOUS at 16:35

## 2020-11-18 RX ADMIN — PREGABALIN 300 MG: 150 CAPSULE ORAL at 21:49

## 2020-11-18 RX ADMIN — SODIUM CHLORIDE 1000 ML: 9 INJECTION, SOLUTION INTRAVENOUS at 15:57

## 2020-11-18 RX ADMIN — Medication 10 ML: at 21:51

## 2020-11-18 RX ADMIN — PRAVASTATIN SODIUM 20 MG: 20 TABLET ORAL at 21:49

## 2020-11-18 RX ADMIN — CYCLOBENZAPRINE 10 MG: 10 TABLET, FILM COATED ORAL at 21:48

## 2020-11-18 RX ADMIN — DULOXETINE HYDROCHLORIDE 30 MG: 30 CAPSULE, DELAYED RELEASE ORAL at 21:49

## 2020-11-18 RX ADMIN — HYDROCODONE BITARTRATE AND ACETAMINOPHEN 1 TABLET: 5; 325 TABLET ORAL at 21:48

## 2020-11-18 RX ADMIN — Medication 2 G: at 17:44

## 2020-11-18 RX ADMIN — OXYCODONE AND ACETAMINOPHEN 1 TABLET: 5; 325 TABLET ORAL at 13:58

## 2020-11-18 RX ADMIN — FENTANYL CITRATE 25 MCG: 50 INJECTION, SOLUTION INTRAMUSCULAR; INTRAVENOUS at 16:10

## 2020-11-18 RX ADMIN — Medication 3 MG: at 21:48

## 2020-11-18 RX ADMIN — Medication 10 ML: at 16:35

## 2020-11-18 RX ADMIN — FUROSEMIDE 20 MG: 10 INJECTION, SOLUTION INTRAMUSCULAR; INTRAVENOUS at 22:35

## 2020-11-18 RX ADMIN — POTASSIUM CHLORIDE 20 MEQ: 1500 TABLET, EXTENDED RELEASE ORAL at 21:50

## 2020-11-18 RX ADMIN — MORPHINE SULFATE 4 MG: 4 INJECTION, SOLUTION INTRAMUSCULAR; INTRAVENOUS at 17:59

## 2020-11-18 RX ADMIN — HYDROXYUREA 500 MG: 500 CAPSULE ORAL at 21:48

## 2020-11-18 ASSESSMENT — PAIN SCALES - GENERAL
PAINLEVEL_OUTOF10: 10

## 2020-11-18 ASSESSMENT — PAIN DESCRIPTION - DESCRIPTORS
DESCRIPTORS: ACHING;CONSTANT;DISCOMFORT
DESCRIPTORS: ACHING;CONSTANT;DISCOMFORT

## 2020-11-18 ASSESSMENT — PAIN DESCRIPTION - LOCATION
LOCATION: GENERALIZED
LOCATION: GENERALIZED

## 2020-11-18 NOTE — ED PROVIDER NOTES
ED Attending  CC: Tana         Department of Emergency Medicine   ED  Provider Note  Admit Date/RoomTime: 11/18/2020  1:02 PM  ED Room: Carin HARMON/KATHY  MRN: [id number]  Chief Complaint:   Leg Pain (hx of clots, on eliquis, pt reports bilateral foot swelling and left leg swelling )       History of Present Illness   Source of history provided by:  patient. History/Exam Limitations: none. Karishma Estrada is a 50 y.o. male who presents to the ED by private vehicle for worsening bilateral lower leg swelling and pain x1 week. Patient states the left is worse than the right. Patient states that he also had palpitations and shortness of breath this morning. Patient states his symptoms are mild in severity and describes as an aching, sharp pain. Patient denies anything making it better or worse. Patient states he recently had blood clots removed from his leg about a month ago. Patient sees Dr. Fabiola Wilson. Patient takes Eliquis. Patient also has a history of chronic PEs. Denies fever/chills, headache, vision change, dizziness, cough, hemoptysis, chest pain, abdominal pain, NVD, numbness/weakness. ROS   Pertinent positives and negatives are stated within HPI, all other systems reviewed and are negative. has a past medical history of Accident, Acute thrombosis of inferior vena cava ([address] SIMÓN (acute kidney injury) ([address] Allergic rhinitis, Chronic back pain, Depression, Diabetes mellitus ([address] Difficulty sleeping, Displacement of lumbar intervertebral disc without myelopathy, Fibromyalgia, Fractured rib, H/O seasonal allergies, Head injury, Hyperlipidemia, Hypertension, Obesity (BMI 35.0-39.9 without comorbidity), Osteoarthritis, and Thoracic or lumbosacral neuritis or radiculitis, unspecified. has a past surgical history that includes Neck surgery (2000); shoulder surgery (2001 &2007); Wrist surgery (2007); Rotator cuff repair (Left, 2014); hernia repair (2001); Shoulder arthroscopy (Left, 11 21 14);  Vasectomy; Hip Arthroplasty (Left, 4/11/2016); Total hip arthroplasty (Right, 10/31/2016); Foot surgery (Right, 1985); pr colonoscopy flx dx w/collj spec when pfrmd (N/A, 5/7/2018); hernia repair (Right, 12/9/2019); CT PTC NEW ACCESS (8/3/2020); Upper gastrointestinal endoscopy (N/A, 9/4/2020); Colonoscopy (N/A, 9/5/2020); laparotomy (N/A, 9/18/2020); and iliac artery stent insertion (N/A, 10/11/2020). Social History:  reports that he has never smoked. His smokeless tobacco use includes chew. He reports previous alcohol use. He reports that he does not use drugs. Family History: family history includes Arthritis in his father; Cancer in his maternal grandfather and paternal uncle; Diabetes in his father and paternal grandfather; Heart Disease in his maternal grandmother; Hypertension in his mother; Stroke in his maternal aunt. Allergies: Seasonal and Tramadol    Physical Exam Section   Oxygen Saturation Interpretation: Normal.   ED Triage Vitals [11/18/20 1307]   BP Temp Temp src Pulse Resp SpO2 Height Weight   133/86 98 °F (36.7 °C) -- 119 17 98 % 6' 1\" (1.854 m) 237 lb (107.5 kg)       Physical Exam  · Constitutional/General: Alert and oriented x3, well appearing, non toxic. · HEENT:  NC/NT. PERRLA,  Airway patent. · Neck: Supple, full ROM, non tender to palpation in the midline, no stridor, no crepitus, no meningeal signs  · Respiratory: Lungs clear to auscultation bilaterally, no wheezes, rales, or rhonchi. Not in respiratory distress  · CV:  Regular rate. Regular rhythm. No murmurs, gallops, or rubs. 2+ distal pulses  · Chest: No chest wall tenderness  · GI:  Abdomen Soft, Non tender, Non distended. +BS. No rebound, guarding, or rigidity. No pulsatile masses. · Musculoskeletal: erythema and warmth of left leg, swelling of left leg, ttp extending from calf to medial thigh; swelling of right calf with ttp of right calf; Moves all extremities x 4. Warm and well perfused, no clubbing, cyanosis, or edema.  Capillary Ref Range    Troponin <0.01 0.00 - 0.03 ng/mL   Brain Natriuretic Peptide   Result Value Ref Range    Pro-BNP 80 0 - 125 pg/mL   Protime-INR   Result Value Ref Range    Protime 12.8 (H) 9.3 - 12.4 sec    INR 1.1    APTT   Result Value Ref Range    aPTT 31.3 24.5 - 35.1 sec   EKG 12 Lead   Result Value Ref Range    Ventricular Rate 109 BPM    Atrial Rate 109 BPM    P-R Interval 158 ms    QRS Duration 96 ms    Q-T Interval 338 ms    QTc Calculation (Bazett) 455 ms    P Axis 57 degrees    R Axis 69 degrees    T Axis 28 degrees     Imaging: All Radiology results interpreted by Radiologist unless otherwise noted. CTA PULMONARY W CONTRAST   Final Result   1. No pulmonary embolism. Previously seen pulmonary emboli on prior are no   longer evident. 2.  Ground-glass and interstitial opacities throughout both lungs could   suggest pulmonary vascular congestion versus bilateral pneumonia. 3.  Peribronchial inflammatory changes. US DUP LOWER EXTREMITIES BILATERAL VENOUS   Final Result   Bilateral compressible nonocclusive deep vein thromboses as detailed   above. XR CHEST PORTABLE   Final Result   No acute cardiopulmonary process. EKG #1:  Interpreted by emergency department physician unless otherwise noted. Time:  1343    Rate: 109  Rhythm: Sinus.   Interpretation: no acute changes       ED Course / Medical Decision Making     Medications   sodium chloride flush 0.9 % injection 10 mL (10 mLs Intravenous Given 11/18/20 1635)   oxyCODONE-acetaminophen (PERCOCET) 5-325 MG per tablet 1 tablet (1 tablet Oral Given 11/18/20 1358)   0.9 % sodium chloride bolus (0 mLs Intravenous Stopped 11/18/20 1637)   fentaNYL (SUBLIMAZE) injection 25 mcg (25 mcg Intravenous Given 11/18/20 1610)   iopamidol (ISOVUE-370) 76 % injection 75 mL (75 mLs Intravenous Given 11/18/20 1635)   ceFAZolin (ANCEF) 2 g in sterile water 20 mL IV syringe (2 g Intravenous Given 11/18/20 1460)          Consultations: None    Procedures:   none    MDM: Patient presenting with bilateral lower leg swelling and pain. Patient is in no acute distress, afebrile, nontoxic appearance. Patient's labs are stable. Patient's EKG is negative. Patient's ultrasound showing nonocclusive DVTs. Patient's CTA negative for PE but it is showing bilateral groundglass opacities throughout both lungs. On exam, patient appears to have cellulitis of his left leg. Patient will be given a dose of Ancef. Patient's Covid is negative. Spoke with Dr. Inocencio Schwab who will admit the patient. Counseling:  I reviewed today's visit with the patient in addition to providing specific details for the plan of care and counseling regarding the diagnosis and prognosis. Questions are answered at this time and are agreeable with the plan. Assessment      1. Left leg cellulitis      This patient's ED course included: a personal history and physicial examination and multiple bedside re-evaluations  This patient has remained hemodynamically stable during their ED course. Plan   Admit to med/surg floor. Patient condition is stable. New Medications     New Prescriptions    No medications on file     Electronically signed by Lissette Mckinley PA-C   DD: 11/18/20  **This report was transcribed using voice recognition software. Every effort was made to ensure accuracy; however, inadvertent computerized transcription errors may be present.   END OF PROVIDER NOTE     Lissette Mckinley PA-C  11/18/20 5503

## 2020-11-19 PROBLEM — I82.403 DEEP VEIN THROMBOSIS (DVT) OF BOTH LOWER EXTREMITIES (HCC): Status: ACTIVE | Noted: 2020-11-19

## 2020-11-19 PROBLEM — M54.9 BACK PAIN: Status: ACTIVE | Noted: 2020-11-19

## 2020-11-19 PROBLEM — R91.8 GROUND GLASS OPACITY PRESENT ON IMAGING OF LUNG: Status: ACTIVE | Noted: 2020-11-19

## 2020-11-19 PROBLEM — Z79.01 ANTICOAGULATED: Status: ACTIVE | Noted: 2020-11-19

## 2020-11-19 PROBLEM — F17.200 TOBACCO DEPENDENCE: Status: ACTIVE | Noted: 2020-11-19

## 2020-11-19 LAB
ALBUMIN SERPL-MCNC: 3.3 G/DL (ref 3.5–5.2)
ALP BLD-CCNC: 123 U/L (ref 40–129)
ALT SERPL-CCNC: 22 U/L (ref 0–40)
ANION GAP SERPL CALCULATED.3IONS-SCNC: 9 MMOL/L (ref 7–16)
ANISOCYTOSIS: ABNORMAL
AST SERPL-CCNC: 21 U/L (ref 0–39)
BASOPHILS ABSOLUTE: 0.06 E9/L (ref 0–0.2)
BASOPHILS RELATIVE PERCENT: 0.9 % (ref 0–2)
BILIRUB SERPL-MCNC: 0.2 MG/DL (ref 0–1.2)
BUN BLDV-MCNC: 8 MG/DL (ref 6–20)
CALCIUM SERPL-MCNC: 7.9 MG/DL (ref 8.6–10.2)
CHLORIDE BLD-SCNC: 108 MMOL/L (ref 98–107)
CHOLESTEROL, TOTAL: 111 MG/DL (ref 0–199)
CO2: 25 MMOL/L (ref 22–29)
CREAT SERPL-MCNC: 0.9 MG/DL (ref 0.7–1.2)
EKG ATRIAL RATE: 109 BPM
EKG P AXIS: 57 DEGREES
EKG P-R INTERVAL: 158 MS
EKG Q-T INTERVAL: 338 MS
EKG QRS DURATION: 96 MS
EKG QTC CALCULATION (BAZETT): 455 MS
EKG R AXIS: 69 DEGREES
EKG T AXIS: 28 DEGREES
EKG VENTRICULAR RATE: 109 BPM
EOSINOPHILS ABSOLUTE: 0.37 E9/L (ref 0.05–0.5)
EOSINOPHILS RELATIVE PERCENT: 5.2 % (ref 0–6)
GFR AFRICAN AMERICAN: >60
GFR NON-AFRICAN AMERICAN: >60 ML/MIN/1.73
GLUCOSE BLD-MCNC: 94 MG/DL (ref 74–99)
HBA1C MFR BLD: 5.1 % (ref 4–5.6)
HCT VFR BLD CALC: 29.5 % (ref 37–54)
HDLC SERPL-MCNC: 34 MG/DL
HEMOGLOBIN: 9.2 G/DL (ref 12.5–16.5)
LDL CHOLESTEROL CALCULATED: 47 MG/DL (ref 0–99)
LYMPHOCYTES ABSOLUTE: 1.56 E9/L (ref 1.5–4)
LYMPHOCYTES RELATIVE PERCENT: 21.7 % (ref 20–42)
MCH RBC QN AUTO: 30.4 PG (ref 26–35)
MCHC RBC AUTO-ENTMCNC: 31.2 % (ref 32–34.5)
MCV RBC AUTO: 97.4 FL (ref 80–99.9)
METER GLUCOSE: 101 MG/DL (ref 74–99)
METER GLUCOSE: 108 MG/DL (ref 74–99)
METER GLUCOSE: 112 MG/DL (ref 74–99)
METER GLUCOSE: 147 MG/DL (ref 74–99)
MONOCYTES ABSOLUTE: 0.85 E9/L (ref 0.1–0.95)
MONOCYTES RELATIVE PERCENT: 12.2 % (ref 2–12)
NEUTROPHILS ABSOLUTE: 4.26 E9/L (ref 1.8–7.3)
NEUTROPHILS RELATIVE PERCENT: 60 % (ref 43–80)
NUCLEATED RED BLOOD CELLS: 0.9 /100 WBC
PDW BLD-RTO: 24.6 FL (ref 11.5–15)
PLATELET # BLD: 600 E9/L (ref 130–450)
PMV BLD AUTO: 8.9 FL (ref 7–12)
POLYCHROMASIA: ABNORMAL
POTASSIUM REFLEX MAGNESIUM: 3.7 MMOL/L (ref 3.5–5)
RBC # BLD: 3.03 E12/L (ref 3.8–5.8)
SODIUM BLD-SCNC: 142 MMOL/L (ref 132–146)
TOTAL PROTEIN: 6 G/DL (ref 6.4–8.3)
TRIGL SERPL-MCNC: 148 MG/DL (ref 0–149)
VLDLC SERPL CALC-MCNC: 30 MG/DL
WBC # BLD: 7.1 E9/L (ref 4.5–11.5)

## 2020-11-19 PROCEDURE — 2580000003 HC RX 258: Performed by: FAMILY MEDICINE

## 2020-11-19 PROCEDURE — 6360000002 HC RX W HCPCS: Performed by: FAMILY MEDICINE

## 2020-11-19 PROCEDURE — 80061 LIPID PANEL: CPT

## 2020-11-19 PROCEDURE — 80053 COMPREHEN METABOLIC PANEL: CPT

## 2020-11-19 PROCEDURE — 6370000000 HC RX 637 (ALT 250 FOR IP): Performed by: FAMILY MEDICINE

## 2020-11-19 PROCEDURE — 85025 COMPLETE CBC W/AUTO DIFF WBC: CPT

## 2020-11-19 PROCEDURE — 83036 HEMOGLOBIN GLYCOSYLATED A1C: CPT

## 2020-11-19 PROCEDURE — 96376 TX/PRO/DX INJ SAME DRUG ADON: CPT

## 2020-11-19 PROCEDURE — G0378 HOSPITAL OBSERVATION PER HR: HCPCS

## 2020-11-19 PROCEDURE — 36415 COLL VENOUS BLD VENIPUNCTURE: CPT

## 2020-11-19 PROCEDURE — 93010 ELECTROCARDIOGRAM REPORT: CPT | Performed by: INTERNAL MEDICINE

## 2020-11-19 PROCEDURE — 96367 TX/PROPH/DG ADDL SEQ IV INF: CPT

## 2020-11-19 PROCEDURE — 2500000003 HC RX 250 WO HCPCS: Performed by: FAMILY MEDICINE

## 2020-11-19 PROCEDURE — 82962 GLUCOSE BLOOD TEST: CPT

## 2020-11-19 RX ORDER — OXYCODONE HYDROCHLORIDE AND ACETAMINOPHEN 5; 325 MG/1; MG/1
1 TABLET ORAL EVERY 6 HOURS PRN
Status: DISCONTINUED | OUTPATIENT
Start: 2020-11-19 | End: 2020-11-20 | Stop reason: HOSPADM

## 2020-11-19 RX ADMIN — PREGABALIN 300 MG: 150 CAPSULE ORAL at 08:59

## 2020-11-19 RX ADMIN — APIXABAN 5 MG: 5 TABLET, FILM COATED ORAL at 08:47

## 2020-11-19 RX ADMIN — PRAVASTATIN SODIUM 20 MG: 20 TABLET ORAL at 20:16

## 2020-11-19 RX ADMIN — Medication 10 ML: at 10:09

## 2020-11-19 RX ADMIN — PANTOPRAZOLE SODIUM 40 MG: 40 TABLET, DELAYED RELEASE ORAL at 08:47

## 2020-11-19 RX ADMIN — OXYCODONE AND ACETAMINOPHEN 1 TABLET: 5; 325 TABLET ORAL at 10:03

## 2020-11-19 RX ADMIN — METOPROLOL SUCCINATE 25 MG: 25 TABLET, EXTENDED RELEASE ORAL at 08:48

## 2020-11-19 RX ADMIN — Medication 10 ML: at 21:00

## 2020-11-19 RX ADMIN — DULOXETINE HYDROCHLORIDE 30 MG: 30 CAPSULE, DELAYED RELEASE ORAL at 08:48

## 2020-11-19 RX ADMIN — FUROSEMIDE 20 MG: 10 INJECTION, SOLUTION INTRAMUSCULAR; INTRAVENOUS at 10:06

## 2020-11-19 RX ADMIN — CYCLOBENZAPRINE 10 MG: 10 TABLET, FILM COATED ORAL at 20:17

## 2020-11-19 RX ADMIN — FERROUS SULFATE TAB 325 MG (65 MG ELEMENTAL FE) 325 MG: 325 (65 FE) TAB at 08:59

## 2020-11-19 RX ADMIN — POTASSIUM CHLORIDE 20 MEQ: 1500 TABLET, EXTENDED RELEASE ORAL at 16:15

## 2020-11-19 RX ADMIN — PREGABALIN 300 MG: 150 CAPSULE ORAL at 20:16

## 2020-11-19 RX ADMIN — APIXABAN 5 MG: 5 TABLET, FILM COATED ORAL at 20:16

## 2020-11-19 RX ADMIN — INSULIN GLARGINE 25 UNITS: 100 INJECTION, SOLUTION SUBCUTANEOUS at 08:53

## 2020-11-19 RX ADMIN — FERROUS SULFATE TAB 325 MG (65 MG ELEMENTAL FE) 325 MG: 325 (65 FE) TAB at 20:17

## 2020-11-19 RX ADMIN — CEFAZOLIN 1 G: 1 INJECTION, POWDER, FOR SOLUTION INTRAMUSCULAR; INTRAVENOUS at 03:56

## 2020-11-19 RX ADMIN — OXYCODONE AND ACETAMINOPHEN 1 TABLET: 5; 325 TABLET ORAL at 16:14

## 2020-11-19 RX ADMIN — CYCLOBENZAPRINE 10 MG: 10 TABLET, FILM COATED ORAL at 14:48

## 2020-11-19 RX ADMIN — CHOLESTYRAMINE 4 G: 4 POWDER, FOR SUSPENSION ORAL at 08:48

## 2020-11-19 RX ADMIN — FUROSEMIDE 20 MG: 10 INJECTION, SOLUTION INTRAMUSCULAR; INTRAVENOUS at 18:31

## 2020-11-19 RX ADMIN — CYCLOBENZAPRINE 10 MG: 10 TABLET, FILM COATED ORAL at 08:47

## 2020-11-19 RX ADMIN — HYDROXYUREA 500 MG: 500 CAPSULE ORAL at 20:17

## 2020-11-19 RX ADMIN — OXYCODONE AND ACETAMINOPHEN 1 TABLET: 5; 325 TABLET ORAL at 22:02

## 2020-11-19 RX ADMIN — FENOFIBRATE 54 MG: 54 TABLET ORAL at 08:49

## 2020-11-19 RX ADMIN — OXYCODONE AND ACETAMINOPHEN 1 TABLET: 5; 325 TABLET ORAL at 03:56

## 2020-11-19 RX ADMIN — HYDROXYUREA 500 MG: 500 CAPSULE ORAL at 08:49

## 2020-11-19 RX ADMIN — POTASSIUM CHLORIDE 20 MEQ: 1500 TABLET, EXTENDED RELEASE ORAL at 08:48

## 2020-11-19 RX ADMIN — DOXYCYCLINE 100 MG: 100 INJECTION, POWDER, LYOPHILIZED, FOR SOLUTION INTRAVENOUS at 04:00

## 2020-11-19 ASSESSMENT — PAIN DESCRIPTION - ONSET
ONSET: ON-GOING
ONSET: ON-GOING

## 2020-11-19 ASSESSMENT — PAIN SCALES - GENERAL
PAINLEVEL_OUTOF10: 0
PAINLEVEL_OUTOF10: 0
PAINLEVEL_OUTOF10: 10
PAINLEVEL_OUTOF10: 8

## 2020-11-19 ASSESSMENT — PAIN DESCRIPTION - LOCATION
LOCATION: BACK;SHOULDER
LOCATION: BACK;SHOULDER
LOCATION: GENERALIZED
LOCATION: SHOULDER

## 2020-11-19 ASSESSMENT — PAIN DESCRIPTION - PAIN TYPE
TYPE: CHRONIC PAIN

## 2020-11-19 ASSESSMENT — PAIN DESCRIPTION - PROGRESSION
CLINICAL_PROGRESSION: NOT CHANGED
CLINICAL_PROGRESSION: NOT CHANGED

## 2020-11-19 ASSESSMENT — PAIN DESCRIPTION - DESCRIPTORS
DESCRIPTORS: ACHING;CONSTANT;DISCOMFORT

## 2020-11-19 ASSESSMENT — PAIN DESCRIPTION - FREQUENCY
FREQUENCY: CONTINUOUS
FREQUENCY: CONTINUOUS

## 2020-11-19 ASSESSMENT — PAIN DESCRIPTION - ORIENTATION
ORIENTATION: LOWER;UPPER
ORIENTATION: LOWER

## 2020-11-19 NOTE — CONSULTS
[facility]  Hematology/Oncology  Consult      Patient Name: Ceci Caicedo II  YOB: 1975  PCP: IGOR Fisher CNP   Referring Provider:      Reason for Consultation:   Chief Complaint   Patient presents with    Leg Pain     hx of clots, on eliquis, pt reports bilateral foot swelling and left leg swelling         History of Present Illness: This is a 78-year-old male patient of Dr. Yoli Montague who is being seen for thrombocytosis and moderate normocytic anemia. He also has a past medical history of diabetes mellitus type 2, hyperlipidemia, bilateral PE with IVC filter, hypertension, and GI bleed. 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 His platelets were 1.1 million. His out patient work up showed JAK2/CALR/MPL all to be negative. His B12 and folate were within normal limits. He had elevated ferritin and low serum iron with normal TIBC. He was started on Hydrea 500 mg twice daily as well as an aspirin 81 mg. He was then hospitalized for in October for an extensive left leg DVT that required EKOS and thrombolytics. Patient has been put on Eliquis. He was also COVID-19 + at that time.  He presented to the emergency room for current admission for lower back pain    Diagnostic Data:     Past Medical History:   Diagnosis Date    Accident 11/2019    stepped on nail rt ft about 1 month ago- healed per patient    Acute thrombosis of inferior vena cava ([address] 10/10/2020    SIMÓN (acute kidney injury) ([address] 2008 apx    kidney bruised due to fall / Arelis Sublette    Allergic rhinitis     Chronic back pain     Depression     Diabetes mellitus ([address]     Difficulty sleeping     at times    Displacement of lumbar intervertebral disc without myelopathy     Fibromyalgia     Fractured rib     2008 / healed    H/O seasonal allergies     Head injury 1980'S apx    no residual s/s    Hyperlipidemia     Hypertension     Obesity (BMI 35.0-39.9 without comorbidity)     bmi 39.2  weight 296 #    Osteoarthritis     Thoracic or lumbosacral neuritis or radiculitis, unspecified        Patient Active Problem List    Diagnosis Date Noted    Deep vein thrombosis (DVT) of both lower extremities ([address] 11/19/2020    Ground glass opacity present on imaging of lung 11/19/2020    Tobacco dependence 11/19/2020    Anticoagulated 11/19/2020    Back pain 11/19/2020    Cellulitis 11/18/2020    Acute thrombosis of inferior vena cava ([address] 10/10/2020    COVID-19 virus infection 10/09/2020    Acute deep vein thrombosis (DVT) ([address] 10/07/2020    SIMÓN (acute kidney injury) ([address] 09/17/2020    Septicemia ([address] 09/17/2020    Intra-abdominal infection 09/17/2020    Acute GI bleeding 09/03/2020    Acute blood loss anemia     Skin wound from surgical incision 08/13/2020    Thrombocytosis ([address] 08/10/2020    Decubitus ulcer of sacral area 08/01/2020    Abnormal findings on diagnostic imaging of gall bladder 08/01/2020    Splenic infarction 08/01/2020    Cyst of spleen 08/01/2020    Splenic abscess 08/01/2020    Anemia 08/01/2020    Other pulmonary embolism without acute cor pulmonale ([address] 08/01/2020    Enterocolitis 07/31/2020    Rectus diastasis 11/01/2019    Recurrent unilateral inguinal hernia 11/01/2019    Cellulitis of sacral region 07/02/2019    Cellulitis of foot 06/30/2019    Neuropathy 06/30/2019    Cellulitis, wound, post-operative 06/30/2019    Left leg swelling 06/30/2019    SIRS (systemic inflammatory response syndrome) ([address] 06/30/2019    Other hyperlipidemia 10/13/2017    Primary osteoarthritis of right hip 11/03/2016    Primary osteoarthritis of left hip 04/11/2016    Type 2 diabetes mellitus ([address] 04/08/2016    Lumbar disc herniation 02/22/2016    Lumbar radiculopathy 12/17/2015    Osteoarthritis of spine with radiculopathy, lumbar region 12/17/2015    Class 1 obesity in adult 12/17/2015    Allergic rhinitis 11/23/2015    Essential hypertension 10/16/2015    Vitamin D deficiency 04/14/2015    Fibromyalgia 12/15/2014    Insomnia 07/04/2014    Osteoarthritis 03/27/2014    Lumbar spondylosis 01/07/2014    Neuropathic pain 05/08/2012        Past Surgical History:   Procedure Laterality Date    COLONOSCOPY N/A 9/5/2020    COLONOSCOPY WITH BIOPSY performed by Wilfredo Ashraf MD at [address] PTC NEW ACCESS  8/3/2020    CT PTC NEW ACCESS 8/3/2020 [facility] CT    FOOT SURGERY Right 1985    to treat shattered bones    HERNIA REPAIR  2001    DOUBLE HERNIA    HERNIA REPAIR Right 12/9/2019    LAPAROSCOPIC RIGHT INGUINAL HERNIA REPAIR, MESH 10x15 cm PLACEMENT performed by Citlali Carter MD at [address] [place] [address] Left 4/11/2016    ILIAC ARTERY STENT INSERTION N/A 10/11/2020    S/P ILIAC STENT PLACEMENT VISUALIZATION performed by Clau Nieves MD at [facility] N/A 9/18/2020    LAPAROTOMY EXPLORATORY, CHOLECYSTECTOMY, BOWEL RESECTION, right darian colectomy, partial omentectomy, and splenectomy performed by Wilfredo Ashraf MD at [address] FLX DX W/MARIA ALEJANDRA Haneyedwin 1980 PFRMD N/A 5/7/2018    COLONOSCOPY DIAGNOSTIC performed by Barbaraann Bumpers, MD at [address] [place] [address] Left 2014    Dr. Rose Mary Reid ARTHROSCOPY Left 11 21 14    SHOULDER SURGERY  2001 &2007    RIGHT AND LEFT repair of tears    TOTAL HIP ARTHROPLASTY Right 10/31/2016    Total right hip  Darrell Quiroz MD    UPPER GASTROINTESTINAL ENDOSCOPY N/A 9/4/2020    EGD DIAGNOSTIC ONLY performed by Arabella Bryan MD at [facility] 1  2007    LEFT WRIST       Family History  Family History   Problem Relation Age of Onset  Hypertension Mother     Arthritis Father     Diabetes Father     Cancer Maternal Grandfather         Skin    Cancer Paternal Uncle         skin    Diabetes Paternal Grandfather     Heart Disease Maternal Grandmother     Stroke Maternal Aunt        Social History    TOBACCO:   reports that he has never smoked. His smokeless tobacco use includes chew. ETOH:   reports previous alcohol use. Home Medications  Prior to Admission medications    Medication Sig Start Date End Date Taking? Authorizing Provider   HYDROcodone-acetaminophen (NORCO) 5-325 MG per tablet Take 1 tablet by mouth every 6 hours as needed for Pain. Yes Historical Provider, MD   Elastic Bandages & Supports (JOBST KNEE HIGH COMPRESSION SM) MISC Knee high compression stockings, 20-30 mm/Hg 11/10/20  Yes Matilde Morton MD   potassium chloride (KLOR-CON M) 20 MEQ extended release tablet Take 1 tablet by mouth 2 times daily (with meals) 10/15/20  Yes Layo Wong MD   apixaban (ELIQUIS) 5 MG TABS tablet Take 1 tablet by mouth 2 times daily 10/15/20  Yes Layo Wong MD   lisinopril (PRINIVIL;ZESTRIL) 5 MG tablet Take 1 tablet by mouth daily 10/16/20  Yes Layo Wong MD   metoprolol succinate (TOPROL XL) 25 MG extended release tablet Take 1 tablet by mouth daily 10/16/20  Yes Layo Wong MD   pregabalin (LYRICA) 150 MG capsule Take 300 mg by mouth 2 times daily.    Yes Historical Provider, MD   melatonin 3 MG TABS tablet Take 3 mg by mouth nightly as needed (sleep)   Yes Historical Provider, MD   cyclobenzaprine (FLEXERIL) 10 MG tablet Take 10 mg by mouth three times daily   Yes Historical Provider, MD   fenofibrate (TRICOR) 54 MG tablet Take 54 mg by mouth daily   Yes Historical Provider, MD   ferrous sulfate (IRON 325) 325 (65 Fe) MG tablet Take 325 mg by mouth 2 times daily   Yes Historical Provider, MD   hydroxyurea (HYDREA) 500 MG chemo capsule Take 500 mg by mouth 2 times daily   Yes Historical Provider, MD   loperamide (IMODIUM) 2 MG capsule Take 2 mg by mouth 4 times daily as needed for Diarrhea   Yes Historical Provider, MD   pantoprazole (PROTONIX) 40 MG tablet Take 40 mg by mouth daily   Yes Historical Provider, MD   DULoxetine (CYMBALTA) 30 MG extended release capsule Take 1 capsule by mouth daily 5/14/20  Yes IGOR Llamas CNP   pravastatin (PRAVACHOL) 20 MG tablet Take 1 tablet by mouth nightly 5/14/20  Yes IGOR Llamas CNP   insulin lispro (HUMALOG) 100 UNIT/ML injection vial Inject 0-3 Units into the skin nightly **Corrective Bedtime (50%) Low Dose Algorithm**   Glucose: Dose:                No Insulin   140-249 1 Unit   250-349 2 Units   Over 350 3 Units 10/15/20   Armando Mahoney MD   cholestyramine Argelia Oneal) 4 g packet Take 1 packet by mouth 2 times daily 9/26/20   Nuzhat Hardin MD   glucose (GLUTOSE) 40 % GEL Take 37.5 mLs by mouth as needed (low sugar) 9/26/20   Nuzhat Hardin MD   insulin glargine (LANTUS) 100 UNIT/ML injection vial Inject 25 Units into the skin Daily 9/27/20   Nuzhat Hardin MD   fluticasone (FLONASE) 50 MCG/ACT nasal spray 2 sprays by Nasal route daily    Historical Provider, MD   insulin lispro (HUMALOG) 100 UNIT/ML injection vial Inject 3 Units into the skin 3 times daily (before meals) Injects 3 units three times daily before meals in addition to following sliding scale  : 0 units  141-180: 1 unit  181-220: 2 units  221-260: 3 units  261-300: 4 units  301-340: 5 units  >341: 6 units and call MD    Historical Provider, MD       Allergies  Allergies   Allergen Reactions    Seasonal      HAYFEVER / sneezing,watery eyes    Tramadol Itching       Review of Systems: + bilateral lower extremity leg pain worse in the left leg. He also pain in the left thigh and hip as well as maryann edema bilateral that patient states makes ambulation difficult.  Constitutional:  No fever chills or rigors.  Eyes: No changes in vision, discharge, or pain   ENT: No Headaches, hearing loss or vertigo.  No mouth sores or 11/19/2020    LYMPHOPCT 21.7 11/19/2020    RBC 3.03 (L) 11/19/2020    MCH 30.4 11/19/2020    MCHC 31.2 (L) 11/19/2020    RDW 24.6 (H) 11/19/2020    NEUTOPHILPCT 60.0 11/19/2020    MONOPCT 12.2 (H) 11/19/2020    BASOPCT 0.9 11/19/2020    NEUTROABS 4.26 11/19/2020    LYMPHSABS 1.56 11/19/2020    MONOSABS 0.85 11/19/2020    EOSABS 0.37 11/19/2020    BASOSABS 0.06 11/19/2020       Lab Results   Component Value Date     11/19/2020    K 3.7 11/19/2020     (H) 11/19/2020    CO2 25 11/19/2020    BUN 8 11/19/2020    CREATININE 0.9 11/19/2020    GLUCOSE 94 11/19/2020    CALCIUM 7.9 (L) 11/19/2020    PROT 6.0 (L) 11/19/2020    LABALBU 3.3 (L) 11/19/2020    BILITOT 0.2 11/19/2020    ALKPHOS 123 11/19/2020    AST 21 11/19/2020    ALT 22 11/19/2020    LABGLOM >60 11/19/2020    GFRAA >60 11/19/2020       Lab Results   Component Value Date    IRON 24 (L) 08/01/2020    TIBC 145 (L) 08/01/2020    FERRITIN 539 10/10/2020           Radiology-    Xr Chest Portable    Result Date: 11/18/2020  EXAMINATION: ONE XRAY VIEW OF THE CHEST 11/18/2020 2:17 pm COMPARISON: October 10, 2020 HISTORY: ORDERING SYSTEM PROVIDED HISTORY: sob TECHNOLOGIST PROVIDED HISTORY: Reason for exam:->sob What reading provider will be dictating this exam?->CRC FINDINGS: Lungs are clear, no infiltrates, consolidates or pleural effusions are observed. There is no perihilar vascular congestion. Mediastinum appears unremarkable. Heart is normal size. No acute cardiopulmonary process. Cta Pulmonary W Contrast    Result Date: 11/18/2020  EXAMINATION: CTA OF THE CHEST 11/18/2020 4:33 pm TECHNIQUE: CTA of the chest was performed after the administration of intravenous contrast.  Multiplanar reformatted images are provided for review. MIP images are provided for review. Dose modulation, iterative reconstruction, and/or weight based adjustment of the mA/kV was utilized to reduce the radiation dose to as low as reasonably achievable.  COMPARISON: October 7,  HISTORY: ORDERING SYSTEM PROVIDED HISTORY: sob, h/o pe TECHNOLOGIST PROVIDED HISTORY: Reason for exam:->sob, h/o pe What reading provider will be dictating this exam?->CRC FINDINGS: No filling defect within pulmonary arteries to suggest pulmonary arterial embolism. The heart is normal in size. No pericardial effusion. There are ground-glass opacities throughout both lungs with smooth interlobular septal thickening. There is prominence of pulmonary vasculature. No pneumothorax. No pleural effusion. No mediastinal or hilar lymphadenopathy. View of upper abdomen shows normal bilateral adrenal glands. 1.  No pulmonary embolism. Previously seen pulmonary emboli on prior are no longer evident. 2.  Ground-glass and interstitial opacities throughout both lungs could suggest pulmonary vascular congestion versus bilateral pneumonia. 3.  Peribronchial inflammatory changes. Us Dup Lower Extremities Bilateral Venous    Result Date: 2020  Patient MRN:  [id number] : 1975 Age: 50 years Gender: Male Order Date:  2020 3:07 PM EXAM: US DUP LOWER EXTREMITIES BILATERAL VENOUS NUMBER OF IMAGES:  60 INDICATION:  b/l pain, swelling b/l pain, swelling What reading provider will be dictating this exam?->[facility] COMPARISON: None Findings: There is compressible, nonocclusive thrombosis within the right iliac vein, right common femoral vein, and right profunda vein. There is compressible, nonocclusive thrombosis within the left iliac vein, left profunda vein, and left femoral vein proximally. Bilateral compressible nonocclusive deep vein thromboses as detailed above.         ASSESSMENT/PLAN :  54 yo male  BL PE s/p IVC  Hx GI bleed  S/p ex lap with cholecystectomy and R hemicolectomy with small bowel resection and side-to-side ileocolonic anastomosis with splenic thrombosis and infarction s/p splenectomy and omentectomy  October for an extensive left leg DVT that required EKOS and thrombolytics    - CTA negative for PE  - US BL LE+ DVT. LLEUS from 10/20 showed similar appearances per report. Unknown status of R LE as it was never imaged, though positive now, good chance he had a clot burden there as well previously  - Improved clinically. Recieved abx, now off as procal negative  - As he is having clinic improvement, would continue Eliquis. Reviewed instructions for outpatient dosing again  - If symptoms worsen of there is evidence of thrombus extension, switch from Eliquis to [address], APRN - CNP  Electronically signed 11/19/2020 at 9:00 AM  Pt seen and examined.  Note edited to reflect updates  Sugar Engle MD

## 2020-11-19 NOTE — DISCHARGE INSTR - COC
splenectomy performed by Petar Castillo MD at [address] FLX DX W/COLLJ [address] WHEN PFRMD N/A 5/7/2018    COLONOSCOPY DIAGNOSTIC performed by Montserrat Velasquez MD at [address] [place] [address] Left 2014    Dr. Tao Pineda ARTHROSCOPY Left 11 21 14   [address] [facility],[address], [facility] SURGERY  2001 &2007    RIGHT AND LEFT repair of tears    TOTAL HIP ARTHROPLASTY Right 10/31/2016    Total right hip  Cem Heaton MD    UPPER GASTROINTESTINAL ENDOSCOPY N/A 9/4/2020    EGD DIAGNOSTIC ONLY performed by Gena Bateman MD at [facility] 1  2007    LEFT WRIST       Immunization History:   Immunization History   Administered Date(s) Administered    Hib PRP-OMP (PedvaxHIB) 09/24/2020    Influenza Virus Vaccine 11/23/2015, 09/26/2019    Influenza, Koleen Rima, IM, PF (6 mo and older Fluzone, Flulaval, Fluarix, and 3 yrs and older Afluria) 10/04/2016, 10/26/2017, 09/26/2019    Meningococcal B, OMV (Bexsero) 09/23/2020    Meningococcal MCV4O (Menveo) 09/24/2020    Pneumococcal Conjugate 13-valent (Xaimn[address]09) 09/23/2020    Pneumococcal Polysaccharide (Utqlfhxng11) 10/04/2016, 07/08/2019    Tdap (Boostrix, Adacel) 10/04/2016       Active Problems:  Patient Active Problem List   Diagnosis Code    Neuropathic pain M79.2    Lumbar spondylosis M47.816    Osteoarthritis M19.90    Insomnia G47.00    Fibromyalgia M79.7    Vitamin D deficiency E55.9    Essential hypertension I10    Allergic rhinitis J30.9    Lumbar radiculopathy M54.16    Osteoarthritis of spine with radiculopathy, lumbar region M47.26    Class 1 obesity in adult E66.9    Lumbar disc herniation M51.26    Type 2 diabetes mellitus ([address] E11.9    Primary osteoarthritis of left hip M16.12    Primary osteoarthritis of right hip M16.11    Cellulitis of foot L03.119    Neuropathy G62.9    Cellulitis, wound, post-operative T81.49XA    Left leg swelling M79.89    SIRS (systemic inflammatory response syndrome) ([facility]) R65.10    Cellulitis of sacral region L03.319    Rectus diastasis M62.08    Recurrent unilateral inguinal hernia K40.91    Enterocolitis K52.9    Decubitus ulcer of sacral area L89.159    Abnormal findings on diagnostic imaging of gall bladder R93.2    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    Cyst of spleen D73.4    Splenic abscess D73.3    Anemia D64.9    Other pulmonary embolism without acute cor pulmonale ([facility]) I26.99    Thrombocytosis ([facility]) D47.3    Acute GI bleeding K92.2    Acute blood loss anemia D62    SIMÓN (acute kidney injury) ([facility]) N17.9    Septicemia ([facility]) A41.9    Intra-abdominal infection B99.9    Acute deep vein thrombosis (DVT) ([facility]) I82.409    Acute thrombosis of inferior vena cava ([facility]) I82.220    Skin wound from surgical incision T14. 8XXA    Other hyperlipidemia E78.49    COVID-19 virus infection U07.1    Cellulitis L03.90    Deep vein thrombosis (DVT) of both lower extremities ([facility]) I82.403    Ground glass opacity present on imaging of lung R91.8    Tobacco dependence F17.200    Anticoagulated Z79.01    Back pain M54.9       Isolation/Infection:   Isolation          No Isolation        Patient Infection Status     Infection Onset Added Last Indicated Last Indicated By Review Planned Expiration Resolved Resolved By    None active    Resolved    COVID-19 Rule Out 20 COVID-19 (Ordered)   20 Rule-Out Test Resulted    COVID-19 10/09/20 10/09/20 10/09/20 COVID-19   10/23/20     COVID-19 Rule Out 10/09/20 10/09/20 10/09/20 COVID-19 (Ordered)   10/09/20 Rule-Out Test Resulted    C-diff Rule Out 20 Clostridium difficile EIA (Ordered)   20 Rule-Out Test Resulted    COVID-19 Rule Out 20 Covid-19 Ambulatory (Ordered)   20 Rule-Out Test Resulted    COVID-19 Rule Out 20 COVID-19 (Ordered)   20 Rule-Out Test Resulted    COVID-19 Rule Out 08/06/20 08/06/20 08/06/20 COVID-19 (Ordered)   08/06/20 Rule-Out Test Resulted    C-diff Rule Out 07/31/20 07/31/20 07/31/20 Clostridium difficile EIA (Ordered)   08/06/20 Song Dai RN    Order discontinued by RN/physician    MRSA 06/30/19 07/02/19 06/30/19 Wound Culture   08/06/20 Song Dai RN          Nurse Assessment:  Last Vital Signs: BP (!) 90/52   Pulse 82   Temp 97.6 °F (36.4 °C) (Temporal)   Resp 16   Ht 6' 1\" (1.854 m)   Wt 237 lb (107.5 kg)   SpO2 96%   BMI 31.27 kg/m²     Last documented pain score (0-10 scale): Pain Level: 10  Last Weight:   Wt Readings from Last 1 Encounters:   11/18/20 237 lb (107.5 kg)     Mental Status:  oriented, alert, coherent, logical, thought processes intact and able to concentrate and follow conversation    IV Access:  - None    Nursing Mobility/ADLs:  Walking   Independent  Transfer  Independent  Bathing  Independent  Dressing  Independent  Toileting  Independent  Feeding  Independent  Med [address] [place]  Med Delivery   whole    Wound Care Documentation and Therapy:  Incision 04/11/16 Hip Left (Active)   Number of days: 1683       Incision 10/31/16 Hip Right (Active)   Number of days: 1480       Wound 10/08/20 Coccyx (Active)   Dressing Status Clean;Dry; Intact 11/19/20 0900   Wound Cleansed Cleansed with saline 11/18/20 2100   Dressing/Treatment Dry dressing 11/19/20 0900   Wound Length (cm) 2 cm 11/19/20 0152   Wound Width (cm) 2 cm 11/19/20 0152   Wound Depth (cm) 4 cm 11/19/20 0152   Wound Surface Area (cm^2) 4 cm^2 11/19/20 0152   Change in Wound Size % (l*w) -700 11/19/20 0152   Wound Volume (cm^3) 16 cm^3 11/19/20 0152   Wound Healing % -814 11/19/20 0152   Drainage Amount Small 11/19/20 0900   Drainage Description Thin;Yellow 11/19/20 0900   Odor None 11/19/20 0900   Loli-wound Assessment Blanchable erythema 11/19/20 0900   Number of days: 42       Wound 10/09/20 Abdomen Mid (Active)   Number of days: 41        Elimination:  Continence: · Bowel: Yes  · Bladder: Yes  Urinary Catheter: None   Colostomy/Ileostomy/Ileal Conduit: No       Date of Last BM: 11/19/2020    Intake/Output Summary (Last 24 hours) at 11/19/2020 1529  Last data filed at 11/19/2020 0157  Gross per 24 hour   Intake 600 ml   Output --   Net 600 ml     I/O last 3 completed shifts: In: 600 [P.O.:600]  Out: -     Safety Concerns: At Risk for Falls    Impairments/Disabilities:      None    Nutrition Therapy:  Current Nutrition Therapy:   - Oral Diet:  General    Routes of Feeding: Oral  Liquids: Thin Liquids  Daily Fluid Restriction: no  Last Modified Barium Swallow with Video (Video Swallowing Test): not done    Treatments at the Time of Hospital Discharge:   Respiratory Treatments: ***  Oxygen Therapy:  is not on home oxygen therapy.   Ventilator:    - No ventilator support    Rehab Therapies: {THERAPEUTIC INTERVENTION:2674629481}  Weight Bearing Status/Restrictions: No weight bearing restirctions  Other Medical Equipment (for information only, NOT a DME order):  {EQUIPMENT:705106519}  Other Treatments: ***    Patient's personal belongings (please select all that are sent with patient):  None    RN SIGNATURE:  Electronically signed by Eran Zhao RN on 11/20/20 at 4:57 PM EST    CASE MANAGEMENT/SOCIAL WORK SECTION    Inpatient Status Date: ***    Readmission Risk Assessment Score:  Readmission Risk              Risk of Unplanned Readmission:        0           Discharging to Facility/ Agency   · Name: [facility]  · Address:  · Phone:  · Fax:    Dialysis Facility (if applicable)   · Name:  · Address:  · Dialysis Schedule:  · Phone:  · Fax:    / signature: Electronically signed by OSMAN Contreras on 11/19/2020 at 3:30 PM      PHYSICIAN SECTION    Prognosis: {Prognosis:6838213310}    Condition at Discharge: [address] Patient Condition:[id number]}    Rehab Potential (if transferring to Rehab): {Prognosis:9351572426}    Recommended Labs or Other Treatments After Discharge: ***    Physician Certification: I certify the above information and transfer of Katie Hill  is necessary for the continuing treatment of the diagnosis listed and that he requires Intermediate Nursing Care for *** 30 days.      Update Admission H&P: {[facility]}    PHYSICIAN SIGNATURE:  {Esignature:[id number]}

## 2020-11-19 NOTE — PROGRESS NOTES
Comprehensive Nutrition Assessment    Type and Reason for Visit:  Initial, Wound    Nutrition Recommendations/Plan: Pt w/ increased needs for wound healing. Recommend Ensure HP w/ Kraig BID & continuing current diet as tolerated. Nutrition Assessment:  Pt w/ pmh GI bleed, DM, COVID-19 October, s/p ex lap CHOLECYSTECTOMY/BOWEL RESEC 9/18 ; s/p ILIAC STENT PLACEMENT 10/11 admin for lower extremity swelling/pain 2/2 possible DVT. Malnutrition Assessment:  Malnutrition Status: At risk for malnutrition (Comment)    Context:  Acute Illness     Findings of the 6 clinical characteristics of malnutrition:  Energy Intake:  Unable to assess  Weight Loss:  No significant weight loss     Body Fat Loss:  No significant body fat loss     Muscle Mass Loss:  Unable to assess    Fluid Accumulation:  7 - Moderate to Severe     Strength:  Not Performed    Estimated Daily Nutrient Needs:  Energy (kcal):  5859; kcal; Weight Used for Energy Requirements:  Current     Protein (g):  125-150 g; Weight Used for Protein Requirements:  Ideal(1.5-1.8 g)        Fluid (ml/day):  Per clinical judgement    Nutrition Related Findings:  A/Ox4, abd wdl, +2 edema bilateral lower extremities, I/O's WDL. Wounds:  Pressure Injury, Stage IV(Coccyx)       Current Nutrition Therapies:    DIET CARB CONTROL; Anthropometric Measures:  · Height: 6' 1\" (185.4 cm)  · Current Body Weight: 237 lb (107.5 kg)(11/19)   · Admission Body Weight: (Same as CBW)    · Usual Body Weight: 239 lb (108.4 kg)(per previous RD note 10/14/20)     · Ideal Body Weight: 184 lbs; % Ideal Body Weight 128.8 %   · BMI: 31.3   · BMI Categories: Obese Class 1 (BMI 30.0-34. 9)       Nutrition Diagnosis:   · Increased nutrient needs related to increase demand for energy/nutrients as evidenced by wounds      Nutrition Interventions:   Food and/or Nutrient Delivery:  Continue Current Diet, Start Oral Nutrition Supplement  Nutrition Education/Counseling:  No recommendation at this time   Coordination of Nutrition Care:  Continue to monitor while inpatient    Goals:  Pt to consume >75% of all meals/ONS       Nutrition Monitoring and Evaluation:   Food/Nutrient Intake Outcomes:  Food and Nutrient Intake, Supplement Intake  Physical Signs/Symptoms Outcomes:  Biochemical Data, Nutrition Focused Physical Findings, Weight, Skin, GI Status, Fluid Status or Edema, Hemodynamic Status     Discharge Planning:     Too soon to determine     Electronically signed by Adilene Freitas RD, YRIS on 11/19/20 at 10:27 AM EST    Contact: [phone]

## 2020-11-19 NOTE — H&P
Hospital Medicine History & Physical      PCP: IGOR Segal - CNP    Date of Admission: 11/18/2020    Date of Service: Pt seen/examined on 11/18/2020 and Placed in Observation. Chief Complaint: Bilateral lower extremity swelling and pain      History Of Present Illness:      50 y.o. male who presented to [facility] with past medical history of DVT maintained on Eliquis, status post IVC filter, PE, IVC thrombosis, anemia due to GI bleed, diabetes, colonic mass status post colectomy, cellulitis, sacral decubitus ulcer, enterocolitis, hypertension, insomnia, osteoarthritis status post bilateral total hip arthroplasties, peripheral neuropathy, peripheral vascular disease status post iliac stenting, COVID-19 viral infection in October 2020. 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 Patient has since been on Eliquis. He reports having severe sharp constant pain involving his lower back, ABG showed a pain in both legs. Pain in the leg has been constant for about a week, associated with redness and swelling. He denies any chest pain, shortness of breath or cough. .  No abdominal pain, nausea or vomiting. Vital signs notable for heart rate of 119, labs showed negative SARS-CoV-2, hemoglobin 9.8, platelet count 938, BNP 88, INR 1.1, A1c was 5.5 in September. Chest x-ray is negative. Doppler ultrasound for DVT. CTA of the chest is negative for PE, did show bilateral groundglass opacities possibly due to pneumonia versus edema. There is also peribronchial inflammatory changes. EKG shows sinus tachycardia rate of 109. Echo in September showed EF of 60 to 65%. He is being admitted for further management.     Past Medical History:          Diagnosis Date    Accident 11/2019    stepped on nail rt ft about 1 month ago- healed per patient    Acute thrombosis of inferior vena cava ([facility] [address] 10/10/2020    SIMÓN (acute kidney injury) ([facility] [address] 2008 apx    kidney bruised due to fall / Valeri Goo    Allergic rhinitis     Chronic back pain     Depression     Diabetes mellitus ([facility] [address] [facility]     Difficulty sleeping     at times    Displacement of lumbar intervertebral disc without myelopathy     Fibromyalgia     Fractured rib     2008 / healed    H/O seasonal allergies     Head injury 1980'S apx    no residual s/s    Hyperlipidemia     Hypertension     Obesity (BMI 35.0-39.9 without comorbidity)     bmi 39.2  weight 296 #    Osteoarthritis     Thoracic or lumbosacral neuritis or radiculitis, unspecified        Past Surgical History:          Procedure Laterality Date    COLONOSCOPY N/A 9/5/2020    COLONOSCOPY WITH BIOPSY performed by Tamica Greene MD at [address] PTC NEW ACCESS  8/3/2020    CT PTC NEW ACCESS 8/3/2020 [facility] CT    FOOT SURGERY Right 1985    to treat shattered bones    HERNIA REPAIR  2001    DOUBLE HERNIA    HERNIA REPAIR Right 12/9/2019    LAPAROSCOPIC RIGHT INGUINAL HERNIA REPAIR, MESH 10x15 cm PLACEMENT performed by Carroll Crowe MD at [address] [place] [address] Left 4/11/2016    ILIAC ARTERY STENT INSERTION N/A 10/11/2020    S/P ILIAC STENT PLACEMENT VISUALIZATION performed by Patrick Rose MD at [facility] N/A 9/18/2020    LAPAROTOMY EXPLORATORY, CHOLECYSTECTOMY, BOWEL RESECTION, right darian colectomy, partial omentectomy, and splenectomy performed by Tamica Greene MD at [facility] COLONOSCOPY FLX DX W/COLLIRENE Queen 1978 PFRMD N/A 5/7/2018    COLONOSCOPY DIAGNOSTIC performed by Farooq Zaldivar MD at [address] [place] [address] Left 2014    Dr. Dianne Fuller ARTHROSCOPY Left 11 21 14    SHOULDER SURGERY  2001 &2007    RIGHT AND LEFT repair of tears    TOTAL HIP ARTHROPLASTY Right 10/31/2016    Total right hip  Mikey Lebron MD    UPPER GASTROINTESTINAL ENDOSCOPY N/A 9/4/2020    EGD DIAGNOSTIC ONLY performed by Rashel Zelaya MD at [facility] 1 2007    LEFT WRIST       Medications Prior to Admission:      Prior to Admission medications    Medication Sig Start Date End Date Taking? Authorizing Provider   HYDROcodone-acetaminophen (NORCO) 5-325 MG per tablet Take 1 tablet by mouth every 6 hours as needed for Pain. Yes Historical Provider, MD   Elastic Bandages & Supports (JOBST KNEE HIGH COMPRESSION SM) MISC Knee high compression stockings, 20-30 mm/Hg 11/10/20  Yes Hiral García MD   potassium chloride (KLOR-CON M) 20 MEQ extended release tablet Take 1 tablet by mouth 2 times daily (with meals) 10/15/20  Yes Filiberto Perales MD   apixaban (ELIQUIS) 5 MG TABS tablet Take 1 tablet by mouth 2 times daily 10/15/20  Yes Filiberto Perales MD   lisinopril (PRINIVIL;ZESTRIL) 5 MG tablet Take 1 tablet by mouth daily 10/16/20  Yes Filiberto Perales MD   metoprolol succinate (TOPROL XL) 25 MG extended release tablet Take 1 tablet by mouth daily 10/16/20  Yes Filiberto Perales MD   pregabalin (LYRICA) 150 MG capsule Take 300 mg by mouth 2 times daily.    Yes Historical Provider, MD   melatonin 3 MG TABS tablet Take 3 mg by mouth nightly as needed (sleep)   Yes Historical Provider, MD   cyclobenzaprine (FLEXERIL) 10 MG tablet Take 10 mg by mouth three times daily   Yes Historical Provider, MD   fenofibrate (TRICOR) 54 MG tablet Take 54 mg by mouth daily   Yes Historical Provider, MD   ferrous sulfate (IRON 325) 325 (65 Fe) MG tablet Take 325 mg by mouth 2 times daily   Yes Historical Provider, MD   hydroxyurea (HYDREA) 500 MG chemo capsule Take 500 mg by mouth 2 times daily   Yes Historical Provider, MD   loperamide (IMODIUM) 2 MG capsule Take 2 mg by mouth 4 times daily as needed for Diarrhea   Yes Historical Provider, MD   pantoprazole (PROTONIX) 40 MG tablet Take 40 mg by mouth daily   Yes Historical Provider, MD   DULoxetine (CYMBALTA) 30 MG extended release capsule Take 1 capsule by mouth daily 5/14/20  Yes Nancey Crimes, APRN - CNP   pravastatin (PRAVACHOL) 20 MG tablet Take 1 tablet by mouth nightly 5/14/20  Yes Nancey Crimes, APRN - CNP   insulin lispro (HUMALOG) 100 UNIT/ML injection vial Inject 0-3 Units into the skin nightly **Corrective Bedtime (50%) Low Dose Algorithm**   Glucose: Dose:                No Insulin   140-249 1 Unit   250-349 2 Units   Over 350 3 Units 10/15/20   Remington Herrera MD   cholestyramine Milvia Macarenambo) 4 g packet Take 1 packet by mouth 2 times daily 9/26/20   Alvin Yun MD   glucose (GLUTOSE) 40 % GEL Take 37.5 mLs by mouth as needed (low sugar) 9/26/20   Alvin Yun MD   insulin glargine (LANTUS) 100 UNIT/ML injection vial Inject 25 Units into the skin Daily 9/27/20   Alvin Yun MD   fluticasone (FLONASE) 50 MCG/ACT nasal spray 2 sprays by Nasal route daily    Historical Provider, MD   insulin lispro (HUMALOG) 100 UNIT/ML injection vial Inject 3 Units into the skin 3 times daily (before meals) Injects 3 units three times daily before meals in addition to following sliding scale  : 0 units  141-180: 1 unit  181-220: 2 units  221-260: 3 units  261-300: 4 units  301-340: 5 units  >341: 6 units and call MD    Historical Provider, MD       Allergies:  Seasonal and Tramadol    Social History:      The patient currently lives at home. TOBACCO:   reports that he has never smoked. His smokeless tobacco use includes chew. ETOH:   reports previous alcohol use.       Family History:     Reviewed in detail Positive as follows:        Problem Relation Age of Onset    Hypertension Mother     Arthritis Father     Diabetes Father     Cancer Maternal Grandfather         Skin    Cancer Paternal Uncle         skin    Diabetes Paternal Grandfather     Heart Disease Maternal Grandmother     Stroke Maternal Aunt        REVIEW OF SYSTEMS:   Pertinent positives as noted in the HPI. All other systems reviewed and negative. PHYSICAL EXAM:    /74   Pulse 105   Temp 97.1 °F (36.2 °C) (Temporal)   Resp 16   Ht 6' 1\" (1.854 m)   Wt 237 lb (107.5 kg)   SpO2 98%   BMI 31.27 kg/m²     General appearance: Middle-age male, obese, mild to moderate painful apparent distress, appears stated age and cooperative. Afebrile. HEENT:  Normal cephalic, atraumatic without obvious deformity. Pupils equal, round, and reactive to light. Extra ocular muscles intact. Conjunctivae/corneas clear. Neck: Supple, with full range of motion. No jugular venous distention. Trachea midline. Respiratory:  Normal respiratory effort. Clear to auscultation, bilaterally without Rales/Wheezes/Rhonchi. Cardiovascular:  Regular rate and rhythm with normal S1/S2 without murmurs, rubs or gallops. Abdomen: Soft, non-tender, non-distended with normal bowel sounds. Musculoskeletal:  No clubbing/cyanosis, 3+ edema bilaterally. Limited range of motion without deformity. Skin: Skin color, texture, turgor normal.  Mild redness involving both legs.   Neurologic:  Cranial nerves: II-XII intact, grossly non-focal.  Psychiatric:  Alert and oriented, thought content appropriate, normal insight  Capillary Refill: Brisk,< 3 seconds   Peripheral Pulses: DP pulses difficult to palpate due to edema      Labs:     Recent Labs     11/18/20  1356   WBC 11.1   HGB 9.8*   HCT 31.4*   *     Recent Labs     11/18/20  1356      K 4.0      CO2 23   BUN 7   CREATININE 0.8   CALCIUM 8.1*     Recent Labs     11/18/20  1356   AST 27   ALT 26   BILITOT <0.2   ALKPHOS 142*     Recent Labs     11/18/20  1356   INR 1.1     Recent Labs     11/18/20  1356   TROPONINI <0.01       Urinalysis:      Lab Results   Component Value Date    NITRU Negative 09/17/2020    BLOODU Negative 09/17/2020    SPECGRAV 1.010 09/17/2020    GLUCOSEU Negative 09/17/2020       Radiology:   Reviewed and documented    CTA PULMONARY W CONTRAST   Final Result   1. No pulmonary embolism. Previously seen pulmonary emboli on prior are no   longer evident. 2.  Ground-glass and interstitial opacities throughout both lungs could   suggest pulmonary vascular congestion versus bilateral pneumonia. 3.  Peribronchial inflammatory changes. US DUP LOWER EXTREMITIES BILATERAL VENOUS   Final Result   Bilateral compressible nonocclusive deep vein thromboses as detailed   above. XR CHEST PORTABLE   Final Result   No acute cardiopulmonary process. ASSESSMENT:    Active Hospital Problems    Diagnosis Date Noted    Deep vein thrombosis (DVT) of both lower extremities ([facility] [I82.403] 11/19/2020    Ground glass opacity present on imaging of lung [R91.8] 11/19/2020    Tobacco dependence [F17.200] 11/19/2020    Anticoagulated [Z79.01] 11/19/2020    Back pain [M54.9] 11/19/2020    Cellulitis [L03.90] 11/18/2020    Thrombocytosis ([facility] [address] [facility] [D47.3] 08/10/2020    Anemia [D64.9] 08/01/2020    Other hyperlipidemia [E78.49] 10/13/2017    Type 2 diabetes mellitus ([facility] [address] [facility] [E11.9] 04/08/2016    Class 1 obesity in adult [E66.9] 12/17/2015    Essential hypertension [I10] 10/16/2015     PLAN:  1. Bilateral lower extremity DVT on Eliquis. DVT is provoked by prolonged hospitalization over the past several months and surgeries. Consult hematology. Continue Eliquis. Currently has no PE. 2.  Groundglass opacities on chest CT. pneumonia versus edema. Patient had Covid in October. Has signs of volume overload. SARS-CoV-2 is currently negative. Currently on Ancef and doxy. Check procalcitonin, if negative, we should consider discontinuing antibiotics. Diurese with Lasix. Monitor I's and O's and daily weights. 3. ?  Bilateral lower extremity cellulitis, redness in patient's legs likely due to combination of DVT and chronic venous stasis. Cellulitis is certainly possible. Continue Ancef and doxy.   Watch off antibiotics if procalcitonin is negative. 4.  Thrombocytosis in a patient with prior history of splenectomy. 5.  Back pain, unknown chronicity, upper back pain may be due to PNA. consider imaging of the spine. Pain control and PT  6. Anemia, monitor hemoglobin  7. Type 2 diabetes, blood sugar is controlled, sliding scale coverage, monitor blood sugar. 8.  Hyperlipidemia, statin  9. Hypertension, blood pressures controlled. 10.  Obesity, dietary and lifestyle modification  11. Tobacco dependence, smoking cessation        DVT Prophylaxis: Eliquis  Diet: DIET CARB CONTROL;  Code Status: Full Code    PT/OT Eval Status: Evaluation and treatment    Dispo -inpatient/telemetry       Zulema Cruz MD    Thank you Lilibeth Roldan APRN - CNP for the opportunity to be involved in this patient's care.

## 2020-11-19 NOTE — PROGRESS NOTES
Hospitalist Progress Note      Synopsis: Patient admitted on 11/18/2020  Due to leg swelling. He has a hx of clots and recent LLE clot and is on eliquis. Admitted due to new diagnosis of RLE DVT while on eliquis     Subjective    Clinically improving. Feeling better. Stable overnight. No other overnight issues reported. No CP, SOB, palpitations, blurred vision, HA, lightheadedness, LOC or focal neurological deficits    Exam:  BP (!) 90/52   Pulse 82   Temp 97.6 °F (36.4 °C) (Temporal)   Resp 16   Ht 6' 1\" (1.854 m)   Wt 237 lb (107.5 kg)   SpO2 96%   BMI 31.27 kg/m²   General appearance: No apparent distress, appears stated age and cooperative. HEENT: Pupils equal, round, and reactive to light. Conjunctivae/corneas clear. Neck: Supple. No jugular venous distention. Trachea midline. Respiratory:  Normal respiratory effort. Clear to auscultation, bilaterally without Rales/Wheezes/Rhonchi. Cardiovascular: Regular rate and rhythm with normal S1/S2 without murmurs, rubs or gallops. Abdomen: Soft, non-tender, non-distended with normal bowel sounds. Musculoskeletal: No clubbing, cyanosis or edema bilaterally. Brisk capillary refill. 2+ lower extremity pulses (dorsalis pedis).  Mild b/l lower extremity edema without evidence of cellulitis  Skin:  No rashes    Neurologic: awake, alert and following commands     Medications:  Reviewed    Infusion Medications    dextrose       Scheduled Medications    apixaban  5 mg Oral BID    cholestyramine  1 packet Oral BID    cyclobenzaprine  10 mg Oral TID    DULoxetine  30 mg Oral Daily    fenofibrate  54 mg Oral Daily    ferrous sulfate  325 mg Oral BID    fluticasone  2 spray Nasal Daily    hydroxyurea  500 mg Oral BID    insulin glargine  25 Units Subcutaneous Daily    lisinopril  5 mg Oral Daily    metoprolol succinate  25 mg Oral Daily    pantoprazole  40 mg Oral Daily    potassium chloride  20 mEq Oral BID WC    pravastatin  20 mg Oral Nightly  pregabalin  300 mg Oral BID    sodium chloride flush  10 mL Intravenous 2 times per day    insulin lispro  0-10 Units Subcutaneous 4x Daily AC & HS    furosemide  20 mg Intravenous BID     PRN Meds: oxyCODONE-acetaminophen, melatonin, sodium chloride flush, acetaminophen **OR** acetaminophen, polyethylene glycol, promethazine **OR** ondansetron, potassium chloride **OR** potassium alternative oral replacement **OR** potassium chloride, magnesium sulfate, glucose, dextrose, glucagon (rDNA), dextrose    I/O    Intake/Output Summary (Last 24 hours) at 11/19/2020 1713  Last data filed at 11/19/2020 0157  Gross per 24 hour   Intake 600 ml   Output --   Net 600 ml       Labs:   Recent Labs     11/18/20  1356 11/19/20  0638   WBC 11.1 7.1   HGB 9.8* 9.2*   HCT 31.4* 29.5*   * 600*       Recent Labs     11/18/20  1356 11/19/20  0638    142   K 4.0 3.7    108*   CO2 23 25   BUN 7 8   CREATININE 0.8 0.9   CALCIUM 8.1* 7.9*       Recent Labs     11/18/20  1356 11/19/20  0638   PROT 6.6 6.0*   ALKPHOS 142* 123   ALT 26 22   AST 27 21   BILITOT <0.2 0.2       Recent Labs     11/18/20  1356   INR 1.1       Recent Labs     11/18/20  1356   TROPONINI <0.01       Chronic labs:  Lab Results   Component Value Date    CHOL 111 11/19/2020    TRIG 148 11/19/2020    HDL 34 11/19/2020    LDLCALC 47 11/19/2020    TSH 0.833 09/18/2020    INR 1.1 11/18/2020    LABA1C 5.1 11/19/2020       Radiology:  Imaging studies reviewed today. ASSESSMENT:    Active Problems:    Essential hypertension    Class 1 obesity in adult    Type 2 diabetes mellitus (HCC)    Anemia    Thrombocytosis (HCC)    Other hyperlipidemia    Cellulitis    Deep vein thrombosis (DVT) of both lower extremities (HCC)    Ground glass opacity present on imaging of lung    Tobacco dependence    Anticoagulated    Back pain  Resolved Problems:    * No resolved hospital problems.  *  BL PE s/p IVC  Hx GI bleed  S/p ex lap with cholecystectomy and R hemicolectomy with small bowel resection and side-to-side ileocolonic anastomosis with splenic thrombosis and infarction s/p splenectomy and omentectomy  October for an extensive left leg DVT that required EKOS and thrombolytics. Groundglass opacities on CT chest  HTN  Obesity  Tobacco dependence   HLD    PLAN:  Continue eliquis pending hem/onc recs  DC antibiotics given negative procalcitonin  Lifestyle modifications  SW consult; pt lives at [place] and is without a home at this time  Continue current meds as ordered  Glycemic control       Diet: DIET CARB CONTROL; Dietary Nutrition Supplements: Low Calorie High Protein Supplement  Dietary Nutrition Supplements: Wound Healing Oral Supplement  Code Status: Full Code  PT/OT Eval Status:   As needed  DVT Prophylaxis:   Eliquis  Recommended disposition at discharge:  [address] likely dc disposition     +++++++++++++++++++++++++++++++++++++++++++++++++  Ana Deng MD   [facility].  +++++++++++++++++++++++++++++++++++++++++++++++++  NOTE: This report was transcribed using voice recognition software. Every effort was made to ensure accuracy; however, inadvertent computerized transcription errors may be present.

## 2020-11-20 VITALS
SYSTOLIC BLOOD PRESSURE: 103 MMHG | DIASTOLIC BLOOD PRESSURE: 59 MMHG | RESPIRATION RATE: 17 BRPM | OXYGEN SATURATION: 97 % | HEIGHT: 73 IN | HEART RATE: 93 BPM | BODY MASS INDEX: 31.41 KG/M2 | TEMPERATURE: 96.8 F | WEIGHT: 237 LBS

## 2020-11-20 LAB
ALBUMIN SERPL-MCNC: 3.3 G/DL (ref 3.5–5.2)
ALP BLD-CCNC: 124 U/L (ref 40–129)
ALT SERPL-CCNC: 19 U/L (ref 0–40)
ANION GAP SERPL CALCULATED.3IONS-SCNC: 11 MMOL/L (ref 7–16)
AST SERPL-CCNC: 18 U/L (ref 0–39)
BILIRUB SERPL-MCNC: 0.2 MG/DL (ref 0–1.2)
BUN BLDV-MCNC: 10 MG/DL (ref 6–20)
CALCIUM SERPL-MCNC: 8.3 MG/DL (ref 8.6–10.2)
CHLORIDE BLD-SCNC: 106 MMOL/L (ref 98–107)
CO2: 24 MMOL/L (ref 22–29)
CREAT SERPL-MCNC: 0.8 MG/DL (ref 0.7–1.2)
GFR AFRICAN AMERICAN: >60
GFR NON-AFRICAN AMERICAN: >60 ML/MIN/1.73
GLUCOSE BLD-MCNC: 78 MG/DL (ref 74–99)
MAGNESIUM: 1.2 MG/DL (ref 1.6–2.6)
METER GLUCOSE: 110 MG/DL (ref 74–99)
METER GLUCOSE: 77 MG/DL (ref 74–99)
POTASSIUM REFLEX MAGNESIUM: 3.5 MMOL/L (ref 3.5–5)
SODIUM BLD-SCNC: 141 MMOL/L (ref 132–146)
TOTAL PROTEIN: 6 G/DL (ref 6.4–8.3)

## 2020-11-20 PROCEDURE — G0378 HOSPITAL OBSERVATION PER HR: HCPCS

## 2020-11-20 PROCEDURE — 6370000000 HC RX 637 (ALT 250 FOR IP): Performed by: FAMILY MEDICINE

## 2020-11-20 PROCEDURE — 96365 THER/PROPH/DIAG IV INF INIT: CPT

## 2020-11-20 PROCEDURE — 83735 ASSAY OF MAGNESIUM: CPT

## 2020-11-20 PROCEDURE — 80053 COMPREHEN METABOLIC PANEL: CPT

## 2020-11-20 PROCEDURE — 96366 THER/PROPH/DIAG IV INF ADDON: CPT

## 2020-11-20 PROCEDURE — 36415 COLL VENOUS BLD VENIPUNCTURE: CPT

## 2020-11-20 PROCEDURE — 96376 TX/PRO/DX INJ SAME DRUG ADON: CPT

## 2020-11-20 PROCEDURE — 2580000003 HC RX 258: Performed by: FAMILY MEDICINE

## 2020-11-20 PROCEDURE — 6360000002 HC RX W HCPCS: Performed by: FAMILY MEDICINE

## 2020-11-20 PROCEDURE — 82962 GLUCOSE BLOOD TEST: CPT

## 2020-11-20 RX ORDER — OXYCODONE HYDROCHLORIDE AND ACETAMINOPHEN 5; 325 MG/1; MG/1
1 TABLET ORAL EVERY 6 HOURS PRN
Qty: 12 TABLET | Refills: 0 | Status: SHIPPED | OUTPATIENT
Start: 2020-11-20 | End: 2020-11-23

## 2020-11-20 RX ADMIN — CYCLOBENZAPRINE 10 MG: 10 TABLET, FILM COATED ORAL at 08:45

## 2020-11-20 RX ADMIN — OXYCODONE AND ACETAMINOPHEN 1 TABLET: 5; 325 TABLET ORAL at 04:59

## 2020-11-20 RX ADMIN — FENOFIBRATE 54 MG: 54 TABLET ORAL at 08:46

## 2020-11-20 RX ADMIN — DULOXETINE HYDROCHLORIDE 30 MG: 30 CAPSULE, DELAYED RELEASE ORAL at 08:46

## 2020-11-20 RX ADMIN — FERROUS SULFATE TAB 325 MG (65 MG ELEMENTAL FE) 325 MG: 325 (65 FE) TAB at 08:47

## 2020-11-20 RX ADMIN — SODIUM CHLORIDE, PRESERVATIVE FREE 10 ML: 5 INJECTION INTRAVENOUS at 11:59

## 2020-11-20 RX ADMIN — PANTOPRAZOLE SODIUM 40 MG: 40 TABLET, DELAYED RELEASE ORAL at 08:47

## 2020-11-20 RX ADMIN — PREGABALIN 300 MG: 150 CAPSULE ORAL at 08:46

## 2020-11-20 RX ADMIN — OXYCODONE AND ACETAMINOPHEN 1 TABLET: 5; 325 TABLET ORAL at 11:02

## 2020-11-20 RX ADMIN — FUROSEMIDE 20 MG: 10 INJECTION, SOLUTION INTRAMUSCULAR; INTRAVENOUS at 08:44

## 2020-11-20 RX ADMIN — OXYCODONE AND ACETAMINOPHEN 1 TABLET: 5; 325 TABLET ORAL at 17:09

## 2020-11-20 RX ADMIN — HYDROXYUREA 500 MG: 500 CAPSULE ORAL at 08:46

## 2020-11-20 RX ADMIN — LISINOPRIL 5 MG: 10 TABLET ORAL at 08:45

## 2020-11-20 RX ADMIN — APIXABAN 5 MG: 5 TABLET, FILM COATED ORAL at 08:47

## 2020-11-20 RX ADMIN — CYCLOBENZAPRINE 10 MG: 10 TABLET, FILM COATED ORAL at 13:48

## 2020-11-20 RX ADMIN — Medication 10 ML: at 09:00

## 2020-11-20 RX ADMIN — METOPROLOL SUCCINATE 25 MG: 25 TABLET, EXTENDED RELEASE ORAL at 08:47

## 2020-11-20 RX ADMIN — POTASSIUM CHLORIDE 20 MEQ: 1500 TABLET, EXTENDED RELEASE ORAL at 08:45

## 2020-11-20 RX ADMIN — MAGNESIUM SULFATE HEPTAHYDRATE 2 G: 40 INJECTION, SOLUTION INTRAVENOUS at 14:00

## 2020-11-20 RX ADMIN — MAGNESIUM SULFATE HEPTAHYDRATE 2 G: 40 INJECTION, SOLUTION INTRAVENOUS at 11:59

## 2020-11-20 ASSESSMENT — PAIN SCALES - GENERAL
PAINLEVEL_OUTOF10: 10
PAINLEVEL_OUTOF10: 10
PAINLEVEL_OUTOF10: 0
PAINLEVEL_OUTOF10: 9
PAINLEVEL_OUTOF10: 9
PAINLEVEL_OUTOF10: 0

## 2020-11-20 ASSESSMENT — PAIN DESCRIPTION - LOCATION
LOCATION: BACK;SHOULDER
LOCATION: BACK;LEG

## 2020-11-20 ASSESSMENT — PAIN DESCRIPTION - PAIN TYPE
TYPE: NEUROPATHIC PAIN
TYPE: CHRONIC PAIN

## 2020-11-20 ASSESSMENT — PAIN DESCRIPTION - DESCRIPTORS: DESCRIPTORS: ACHING;CONSTANT;DISCOMFORT

## 2020-11-20 NOTE — PLAN OF CARE
Patient discharged
Problem: Increased nutrient needs (NI-5.1)  Goal: Food and/or Nutrient Delivery  Description: Individualized approach for food/nutrient provision.   Outcome: Met This Shift
Problem: Pain:  Goal: Pain level will decrease  Description: Pain level will decrease  Outcome: Met This Shift     Problem: Pain:  Goal: Control of acute pain  Description: Control of acute pain  Outcome: Met This Shift
Problem: Pain:  Goal: Pain level will decrease  Description: Pain level will decrease  Outcome: Met This Shift     Problem: Pain:  Goal: Control of acute pain  Description: Control of acute pain  Outcome: Met This Shift
Normal rate, regular rhythm.  Heart sounds S1, S2.  No murmurs, rubs or gallops.

## 2020-11-20 NOTE — PROGRESS NOTES
[facility]  Hematology/Oncology  Consult      Patient Name: Hitesh Kat II  YOB: 1975  PCP: IGOR Antunez - CNP   Referring Provider:      Reason for Consultation:   Chief Complaint   Patient presents with    Leg Pain     hx of clots, on eliquis, pt reports bilateral foot swelling and left leg swelling       Subjective: Patient feels well today. He states he is having pain in left leg from the ankle to the hip area. He is supposed to be getting a thigh high compression stocking before d/c later today. History of Present Illness: This is a 52-year-old male patient of Dr. Jasmyn Mcintosh who is being seen for thrombocytosis and moderate normocytic anemia. He also has a past medical history of diabetes mellitus type 2, hyperlipidemia, bilateral PE with IVC filter, hypertension, and GI bleed. 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 His platelets were 1.1 million. His out patient work up showed JAK2/CALR/MPL all to be negative. His B12 and folate were within normal limits. He had elevated ferritin and low serum iron with normal TIBC. He was started on Hydrea 500 mg twice daily as well as an aspirin 81 mg. He was then hospitalized for in October for an extensive left leg DVT that required EKOS and thrombolytics. Patient has been put on Eliquis. He was also COVID-19 + at that time.  He presented to the emergency room for current admission for lower back pain    Diagnostic Data:     Past Medical History:   Diagnosis Date    Accident 11/2019    stepped on nail rt ft about 1 month ago- healed per patient    Acute thrombosis of inferior vena cava ([facility] [address] 10/10/2020    SIMÓN (acute kidney injury) ([facility] [address] 2008 apx ([facility] 06/30/2019    Other hyperlipidemia 10/13/2017    Primary osteoarthritis of right hip 11/03/2016    Primary osteoarthritis of left hip 04/11/2016    Type 2 diabetes mellitus ([facility] 04/08/2016    Lumbar disc herniation 02/22/2016    Lumbar radiculopathy 12/17/2015    Osteoarthritis of spine with radiculopathy, lumbar region 12/17/2015    Class 1 obesity in adult 12/17/2015    Allergic rhinitis 11/23/2015    Essential hypertension 10/16/2015    Vitamin D deficiency 04/14/2015    Fibromyalgia 12/15/2014    Insomnia 07/04/2014    Osteoarthritis 03/27/2014    Lumbar spondylosis 01/07/2014    Neuropathic pain 05/08/2012        Past Surgical History:   Procedure Laterality Date    COLONOSCOPY N/A 9/5/2020    COLONOSCOPY WITH BIOPSY performed by Tierra Clarke MD at [address] PTC NEW ACCESS  8/3/2020    CT PTC NEW ACCESS 8/3/2020 [facility] CT    FOOT SURGERY Right 1985    to treat shattered bones    HERNIA REPAIR  2001    DOUBLE HERNIA    HERNIA REPAIR Right 12/9/2019    LAPAROSCOPIC RIGHT INGUINAL HERNIA REPAIR, MESH 10x15 cm PLACEMENT performed by Kaden George MD at [address] [facility] Left 4/11/2016    ILIAC ARTERY STENT INSERTION N/A 10/11/2020    S/P ILIAC STENT PLACEMENT VISUALIZATION performed by Tunde Zamora MD at [place] [address] N/A 9/18/2020    LAPAROTOMY EXPLORATORY, CHOLECYSTECTOMY, BOWEL RESECTION, right darian colectomy, partial omentectomy, and splenectomy performed by Tierra Clarke MD at [address] [facility] COLONOSCOPY FLX DX W/COLLJ Bret 1978 PFRMD N/A 5/7/2018    COLONOSCOPY DIAGNOSTIC performed by Stephanie Pedersen MD at [address] [facility] Left 2014    Dr. Izzy Wylie ARTHROSCOPY Left 11 21 14    SHOULDER SURGERY  2001 &2007    RIGHT AND LEFT repair of tears    TOTAL HIP ARTHROPLASTY Right 10/31/2016    Total right hip  Sanya Kang MD    UPPER GASTROINTESTINAL ENDOSCOPY N/A 9/4/2020 times daily   Yes Historical Provider, MD   hydroxyurea (HYDREA) 500 MG chemo capsule Take 500 mg by mouth 2 times daily   Yes Historical Provider, MD   loperamide (IMODIUM) 2 MG capsule Take 2 mg by mouth 4 times daily as needed for Diarrhea   Yes Historical Provider, MD   pantoprazole (PROTONIX) 40 MG tablet Take 40 mg by mouth daily   Yes Historical Provider, MD   DULoxetine (CYMBALTA) 30 MG extended release capsule Take 1 capsule by mouth daily 5/14/20  Yes IGOR Michaels CNP   pravastatin (PRAVACHOL) 20 MG tablet Take 1 tablet by mouth nightly 5/14/20  Yes IGOR Michaels CNP   insulin lispro (HUMALOG) 100 UNIT/ML injection vial Inject 0-3 Units into the skin nightly **Corrective Bedtime (50%) Low Dose Algorithm**   Glucose: Dose:                No Insulin   140-249 1 Unit   250-349 2 Units   Over 350 3 Units 10/15/20   Muriel Good MD   cholestyramine Rhiannoniraj Carmona) 4 g packet Take 1 packet by mouth 2 times daily 9/26/20   Roxane Huynh MD   glucose (GLUTOSE) 40 % GEL Take 37.5 mLs by mouth as needed (low sugar) 9/26/20   Roxane Huynh MD   insulin glargine (LANTUS) 100 UNIT/ML injection vial Inject 25 Units into the skin Daily 9/27/20   Roxane Huynh MD   fluticasone (FLONASE) 50 MCG/ACT nasal spray 2 sprays by Nasal route daily    Historical Provider, MD   insulin lispro (HUMALOG) 100 UNIT/ML injection vial Inject 3 Units into the skin 3 times daily (before meals) Injects 3 units three times daily before meals in addition to following sliding scale  : 0 units  141-180: 1 unit  181-220: 2 units  221-260: 3 units  261-300: 4 units  301-340: 5 units  >341: 6 units and call MD    Historical Provider, MD       Allergies  Allergies   Allergen Reactions    Seasonal      HAYFEVER / sneezing,watery eyes    Tramadol Itching       Review of Systems: + bilateral lower extremity leg pain worse in the left leg.  He also pain in the left thigh and hip as well as maryann edema bilateral that patient states makes ambulation difficult.  Constitutional:  No fever chills or rigors.  Eyes: No changes in vision, discharge, or pain   ENT: No Headaches, hearing loss or vertigo. No mouth sores or sore throat. No change in taste or smell.  Cardiovascular: No chest discomfort, dyspnea on exertion, palpitations or loss of consciousness. or phlebitis.  Respiratory: Has no cough or wheezing, Has no sputum production. Has no hemoptysis, Has no pleuritic pain, .  Gastrointestinal: No abdominal pain, appetite loss, blood in stools. No change in bowel habits. No hematemesis    Genitourinary: Patient acknowledges no dysuria, trouble voiding, or hematuria. No nocturia or increased frequency.  Musculoskeletal: No gait disturbance, weakness or joint complaints.  Integumentary: No rash or pruritis.  Neurological: No headache, diplopia, change in muscle strength, numbness or tingling. No change in gait, balance, coordination, mood, affect, memory, mentation, behavior.  Psychiatric: No anxiety, or depression.  Endocrine: No temperature intolerance. No excessive thirst, fluid intake, or urination. No tremor.  Hematologic/Lymphatic: No abnormal bruising or bleeding, blood clots or swollen lymph nodes.  Allergic/Immunologic: No nasal congestion or hives. Objective  BP (!) 103/59   Pulse 93   Temp 96.8 °F (36 °C) (Temporal)   Resp 17   Ht 6' 1\" (1.854 m)   Wt 237 lb (107.5 kg)   SpO2 97%   BMI 31.27 kg/m²     Physical Exam:   Performance Status:  General: AAO to person, place, time, in no acute distress,   Head and neck : PERRLA, EOMI . Sclera non icteric. Oropharynx : Clear  Neck: no JVD,  no adenopathy  Heart:Tachycardic and regular rhythm, no murmur  Lungs: Clear to auscultation   Extremities: +2 bilateral lower extremity edema, no cyanosis, no clubbing. Abdomen: Soft, non-tender;no masses, no organomegaly  Skin:  No rash. Neurologic:Cranial nerves grossly intact.  No focal motor or sensory ileocolonic anastomosis with splenic thrombosis and infarction s/p splenectomy and omentectomy  October for an extensive left leg DVT that required EKOS and thrombolytics    - CTA negative for PE  - US BL LE+ DVT. LLEUS from 10/20 showed similar appearances per report. Unknown status of R LE as it was never imaged, though positive now, good chance he had a clot burden there as well previously  - Improved clinically. Recieved abx, now off as procal negative  - As he is having clinic improvement, would continue Eliquis. Reviewed instructions for outpatient dosing again  - If symptoms worsen of there is evidence of thrombus extension, switch from Eliquis to Warfarin    11/20/20  - No CBC drawn today  - Continue on Eliquis at this time  - If symptoms worsen of there is evidence of thrombus extension, switch from Eliquis to Warfarin  - To follow with me as outpatient on [address] [facility] - CNP  Electronically signed 11/20/2020 at 11:53 AM  Pt seen and examined.  Note edited to reflect updates  Ebenezer Lockett MD

## 2020-12-23 NOTE — DISCHARGE SUMMARY
Hospital Medicine Discharge Summary    Patient ID: Obdulio Alcazar      Patient's PCP: Gigi Haji, APRN - CNP    Admit Date: 11/18/2020     Discharge Date: 11/20/2020      Admitting Physician: Makenna Grant DO     Discharge Physician: Daysi Luther MD     Discharge Diagnoses: Active Hospital Problems    Diagnosis Date Noted    Deep vein thrombosis (DVT) of both lower extremities ([facility] [address] [I82.403] 11/19/2020    Ground glass opacity present on imaging of lung [R91.8] 11/19/2020    Tobacco dependence [F17.200] 11/19/2020    Anticoagulated [Z79.01] 11/19/2020    Back pain [M54.9] 11/19/2020    Cellulitis [L03.90] 11/18/2020    Thrombocytosis ([facility] [address] [D47.3] 08/10/2020    Anemia [D64.9] 08/01/2020    Other hyperlipidemia [E78.49] 10/13/2017    Type 2 diabetes mellitus ([facility] [address] [E11.9] 04/08/2016    Class 1 obesity in adult [E66.9] 12/17/2015    Essential hypertension [I10] 10/16/2015       The patient was seen and examined on day of discharge and this discharge summary is in conjunction with any daily progress note from day of discharge. Hospital Course:   Patient admitted on 11/18/2020  Due to leg swelling. He has a hx of clots and recent LLE clot and is on eliquis. Admitted due to new diagnosis of RLE DVT while on eliquis. Discharged to inpatient rehab on 11/20/20 in stable condition with pcp follow up             Exam:     BP (!) 103/59   Pulse 93   Temp 96.8 °F (36 °C) (Temporal)   Resp 17   Ht 6' 1\" (1.854 m)   Wt 237 lb (107.5 kg)   SpO2 97%   BMI 31.27 kg/m²     General appearance: No apparent distress, appears stated age and cooperative. HEENT: Pupils equal, round, and reactive to light. Conjunctivae/corneas clear. Neck: Supple, with full range of motion. No jugular venous distention. Trachea midline. Respiratory:  Normal respiratory effort. Clear to auscultation, bilaterally without Rales/Wheezes/Rhonchi.   Cardiovascular: Regular rate and rhythm with normal S1/S2 without (PERCOCET) 5-325 MG per tablet Take 1 tablet by mouth every 6 hours as needed for Pain for up to 3 days. , Disp-12 tablet,R-0Print              Details   Elastic Bandages & Supports (JOBST KNEE HIGH COMPRESSION SM) MISC Disp-2 each,R-3, PrintKnee high compression stockings, 20-30 mm/Hg      ! ! insulin lispro (HUMALOG) 100 UNIT/ML injection vial Inject 0-3 Units into the skin nightly **Corrective Bedtime (50%) Low Dose Algorithm**   Glucose: Dose:                No Insulin   140-249 1 Unit   250-349 2 Units   Over 350 3 Units, Disp-1 vial,R-3Normal      potassium chloride (KLOR-CON M) 20 MEQ extended release tablet Take 1 tablet by mouth 2 times daily (with meals), Disp-60 tablet,R-3Normal      apixaban (ELIQUIS) 5 MG TABS tablet Take 1 tablet by mouth 2 times daily, Disp-60 tablet,R-0Print      lisinopril (PRINIVIL;ZESTRIL) 5 MG tablet Take 1 tablet by mouth daily, Disp-30 tablet,R-0Print      metoprolol succinate (TOPROL XL) 25 MG extended release tablet Take 1 tablet by mouth daily, Disp-30 tablet,R-0Print      pregabalin (LYRICA) 150 MG capsule Take 300 mg by mouth 2 times daily. Historical Med      cholestyramine (QUESTRAN) 4 g packet Take 1 packet by mouth 2 times daily, Disp-90 packet,R-3Normal      glucose (GLUTOSE) 40 % GEL Take 37.5 mLs by mouth as needed (low sugar), Disp-45 g,R-1Normal      insulin glargine (LANTUS) 100 UNIT/ML injection vial Inject 25 Units into the skin Daily, Disp-1 vial,R-3Normal      melatonin 3 MG TABS tablet Take 3 mg by mouth nightly as needed (sleep)Historical Med      cyclobenzaprine (FLEXERIL) 10 MG tablet Take 10 mg by mouth three times dailyHistorical Med      fenofibrate (TRICOR) 54 MG tablet Take 54 mg by mouth dailyHistorical Med      ferrous sulfate (IRON 325) 325 (65 Fe) MG tablet Take 325 mg by mouth 2 times dailyHistorical Med      hydroxyurea (HYDREA) 500 MG chemo capsule Take 500 mg by mouth 2 times dailyHistorical Med      loperamide (IMODIUM) 2 MG capsule Take 2 mg by mouth 4 times daily as needed for DiarrheaHistorical Med      !! insulin lispro (HUMALOG) 100 UNIT/ML injection vial Inject 3 Units into the skin 3 times daily (before meals) Injects 3 units three times daily before meals in addition to following sliding scale  : 0 units  141-180: 1 unit  181-220: 2 units  221-260: 3 units  261-300: 4 units  301-340: 5 units  >34 1: 6 units and call MDHistorical Med      fluticasone (FLONASE) 50 MCG/ACT nasal spray 2 sprays by Nasal route dailyHistorical Med      pantoprazole (PROTONIX) 40 MG tablet Take 40 mg by mouth dailyHistorical Med      DULoxetine (CYMBALTA) 30 MG extended release capsule Take 1 capsule by mouth daily, Disp-90 capsule,R-1Normal      pravastatin (PRAVACHOL) 20 MG tablet Take 1 tablet by mouth nightly, Disp-90 tablet,R-1Normal       !! - Potential duplicate medications found. Please discuss with provider. Time Spent on discharge is more than 45 minutes in the examination, evaluation, counseling and review of medications and discharge plan.       Signed:    Mariano Barrera MD   12/23/2020

## 2020-12-30 ENCOUNTER — HOSPITAL ENCOUNTER (INPATIENT)
Age: 45
LOS: 5 days | Discharge: HOME | DRG: 180 | End: 2021-01-05
Attending: EMERGENCY MEDICINE | Admitting: FAMILY MEDICINE
Payer: COMMERCIAL

## 2020-12-30 DIAGNOSIS — R60.0 BILATERAL LEG EDEMA: ICD-10-CM

## 2020-12-30 DIAGNOSIS — I82.403 DVT, LOWER EXTREMITY, RECURRENT, BILATERAL (HCC): ICD-10-CM

## 2020-12-30 DIAGNOSIS — I82.493 ACUTE DEEP VEIN THROMBOSIS (DVT) OF OTHER SPECIFIED VEIN OF BOTH LOWER EXTREMITIES (HCC): Primary | ICD-10-CM

## 2020-12-30 LAB
ALBUMIN SERPL-MCNC: 4.4 G/DL (ref 3.5–5.2)
ALP BLD-CCNC: 126 U/L (ref 40–129)
ALT SERPL-CCNC: 30 U/L (ref 0–40)
ANION GAP SERPL CALCULATED.3IONS-SCNC: 9 MMOL/L (ref 7–16)
AST SERPL-CCNC: 20 U/L (ref 0–39)
BACTERIA: NORMAL /HPF
BASOPHILS ABSOLUTE: 0.12 E9/L (ref 0–0.2)
BASOPHILS RELATIVE PERCENT: 1.6 % (ref 0–2)
BILIRUB SERPL-MCNC: <0.2 MG/DL (ref 0–1.2)
BILIRUBIN URINE: NEGATIVE
BLOOD, URINE: NEGATIVE
BUN BLDV-MCNC: 10 MG/DL (ref 6–20)
CALCIUM SERPL-MCNC: 9.4 MG/DL (ref 8.6–10.2)
CHLORIDE BLD-SCNC: 101 MMOL/L (ref 98–107)
CLARITY: CLEAR
CO2: 27 MMOL/L (ref 22–29)
COLOR: YELLOW
CREAT SERPL-MCNC: 0.8 MG/DL (ref 0.7–1.2)
EOSINOPHILS ABSOLUTE: 0.32 E9/L (ref 0.05–0.5)
EOSINOPHILS RELATIVE PERCENT: 4.3 % (ref 0–6)
GFR AFRICAN AMERICAN: >60
GFR NON-AFRICAN AMERICAN: >60 ML/MIN/1.73
GLUCOSE BLD-MCNC: 99 MG/DL (ref 74–99)
GLUCOSE URINE: NEGATIVE MG/DL
HCT VFR BLD CALC: 38.6 % (ref 37–54)
HEMOGLOBIN: 12.2 G/DL (ref 12.5–16.5)
IMMATURE GRANULOCYTES #: 0.02 E9/L
IMMATURE GRANULOCYTES %: 0.3 % (ref 0–5)
KETONES, URINE: NEGATIVE MG/DL
LEUKOCYTE ESTERASE, URINE: NEGATIVE
LYMPHOCYTES ABSOLUTE: 2.46 E9/L (ref 1.5–4)
LYMPHOCYTES RELATIVE PERCENT: 33 % (ref 20–42)
MCH RBC QN AUTO: 32.9 PG (ref 26–35)
MCHC RBC AUTO-ENTMCNC: 31.6 % (ref 32–34.5)
MCV RBC AUTO: 104 FL (ref 80–99.9)
MONOCYTES ABSOLUTE: 0.96 E9/L (ref 0.1–0.95)
MONOCYTES RELATIVE PERCENT: 12.9 % (ref 2–12)
NEUTROPHILS ABSOLUTE: 3.58 E9/L (ref 1.8–7.3)
NEUTROPHILS RELATIVE PERCENT: 47.9 % (ref 43–80)
NITRITE, URINE: NEGATIVE
PDW BLD-RTO: 18.6 FL (ref 11.5–15)
PH UA: 6 (ref 5–9)
PLATELET # BLD: 563 E9/L (ref 130–450)
PMV BLD AUTO: 8.6 FL (ref 7–12)
POTASSIUM REFLEX MAGNESIUM: 4.3 MMOL/L (ref 3.5–5)
PRO-BNP: 40 PG/ML (ref 0–125)
PROTEIN UA: NEGATIVE MG/DL
RBC # BLD: 3.71 E12/L (ref 3.8–5.8)
RBC UA: NORMAL /HPF (ref 0–2)
SODIUM BLD-SCNC: 137 MMOL/L (ref 132–146)
SPECIFIC GRAVITY UA: <=1.005 (ref 1–1.03)
TOTAL PROTEIN: 7 G/DL (ref 6.4–8.3)
TROPONIN: <0.01 NG/ML (ref 0–0.03)
UROBILINOGEN, URINE: 0.2 E.U./DL
WBC # BLD: 7.5 E9/L (ref 4.5–11.5)
WBC UA: NORMAL /HPF (ref 0–5)

## 2020-12-30 PROCEDURE — 99285 EMERGENCY DEPT VISIT HI MDM: CPT

## 2020-12-30 PROCEDURE — 83880 ASSAY OF NATRIURETIC PEPTIDE: CPT

## 2020-12-30 PROCEDURE — 96365 THER/PROPH/DIAG IV INF INIT: CPT

## 2020-12-30 PROCEDURE — 84484 ASSAY OF TROPONIN QUANT: CPT

## 2020-12-30 PROCEDURE — 85025 COMPLETE CBC W/AUTO DIFF WBC: CPT

## 2020-12-30 PROCEDURE — 6370000000 HC RX 637 (ALT 250 FOR IP): Performed by: EMERGENCY MEDICINE

## 2020-12-30 PROCEDURE — 96376 TX/PRO/DX INJ SAME DRUG ADON: CPT

## 2020-12-30 PROCEDURE — 80053 COMPREHEN METABOLIC PANEL: CPT

## 2020-12-30 PROCEDURE — 96366 THER/PROPH/DIAG IV INF ADDON: CPT

## 2020-12-30 PROCEDURE — 96375 TX/PRO/DX INJ NEW DRUG ADDON: CPT

## 2020-12-30 PROCEDURE — 81001 URINALYSIS AUTO W/SCOPE: CPT

## 2020-12-30 RX ORDER — OXYCODONE HYDROCHLORIDE AND ACETAMINOPHEN 5; 325 MG/1; MG/1
2 TABLET ORAL ONCE
Status: COMPLETED | OUTPATIENT
Start: 2020-12-30 | End: 2020-12-30

## 2020-12-30 RX ADMIN — OXYCODONE AND ACETAMINOPHEN 2 TABLET: 5; 325 TABLET ORAL at 22:52

## 2020-12-30 ASSESSMENT — PAIN SCALES - GENERAL
PAINLEVEL_OUTOF10: 10
PAINLEVEL_OUTOF10: 9

## 2020-12-30 ASSESSMENT — ENCOUNTER SYMPTOMS
VOMITING: 0
BLOOD IN STOOL: 0
SHORTNESS OF BREATH: 0
NAUSEA: 0
DIARRHEA: 0
ABDOMINAL PAIN: 0
TROUBLE SWALLOWING: 0
RHINORRHEA: 0
COLOR CHANGE: 1
COUGH: 0

## 2020-12-30 ASSESSMENT — PAIN DESCRIPTION - LOCATION: LOCATION: LEG

## 2020-12-30 ASSESSMENT — PAIN DESCRIPTION - PAIN TYPE: TYPE: ACUTE PAIN

## 2020-12-31 ENCOUNTER — APPOINTMENT (OUTPATIENT)
Dept: CT IMAGING | Age: 45
DRG: 180 | End: 2020-12-31
Payer: COMMERCIAL

## 2020-12-31 ENCOUNTER — APPOINTMENT (OUTPATIENT)
Dept: CARDIAC CATH/INVASIVE PROCEDURES | Age: 45
DRG: 180 | End: 2020-12-31
Payer: COMMERCIAL

## 2020-12-31 ENCOUNTER — ANESTHESIA EVENT (OUTPATIENT)
Dept: OPERATING ROOM | Age: 45
DRG: 180 | End: 2020-12-31
Payer: COMMERCIAL

## 2020-12-31 PROBLEM — I82.403 DVT, LOWER EXTREMITY, RECURRENT, BILATERAL (HCC): Status: ACTIVE | Noted: 2020-12-31

## 2020-12-31 LAB
APTT: 32.3 SEC (ref 24.5–35.1)
APTT: 37.1 SEC (ref 24.5–35.1)
APTT: 38 SEC (ref 24.5–35.1)
FIBRINOGEN: 382 MG/DL (ref 225–540)
FIBRINOGEN: 409 MG/DL (ref 225–540)
HCT VFR BLD CALC: 37.8 % (ref 37–54)
HCT VFR BLD CALC: 39.1 % (ref 37–54)
HCT VFR BLD CALC: 40.7 % (ref 37–54)
HEMOGLOBIN: 12.5 G/DL (ref 12.5–16.5)
HEMOGLOBIN: 12.8 G/DL (ref 12.5–16.5)
HEMOGLOBIN: 13.3 G/DL (ref 12.5–16.5)
MCH RBC QN AUTO: 32.9 PG (ref 26–35)
MCH RBC QN AUTO: 33.5 PG (ref 26–35)
MCH RBC QN AUTO: 34.4 PG (ref 26–35)
MCHC RBC AUTO-ENTMCNC: 32 % (ref 32–34.5)
MCHC RBC AUTO-ENTMCNC: 32.7 % (ref 32–34.5)
MCHC RBC AUTO-ENTMCNC: 33.9 % (ref 32–34.5)
MCV RBC AUTO: 101.6 FL (ref 80–99.9)
MCV RBC AUTO: 102.5 FL (ref 80–99.9)
MCV RBC AUTO: 102.9 FL (ref 80–99.9)
METER GLUCOSE: 106 MG/DL (ref 74–99)
METER GLUCOSE: 110 MG/DL (ref 74–99)
PDW BLD-RTO: 18.3 FL (ref 11.5–15)
PDW BLD-RTO: 18.3 FL (ref 11.5–15)
PDW BLD-RTO: 18.6 FL (ref 11.5–15)
PLATELET # BLD: 470 E9/L (ref 130–450)
PLATELET # BLD: 477 E9/L (ref 130–450)
PLATELET # BLD: 556 E9/L (ref 130–450)
PMV BLD AUTO: 8.3 FL (ref 7–12)
PMV BLD AUTO: 8.6 FL (ref 7–12)
PMV BLD AUTO: 8.7 FL (ref 7–12)
RBC # BLD: 3.72 E12/L (ref 3.8–5.8)
RBC # BLD: 3.8 E12/L (ref 3.8–5.8)
RBC # BLD: 3.97 E12/L (ref 3.8–5.8)
SARS-COV-2, NAAT: NOT DETECTED
WBC # BLD: 7.1 E9/L (ref 4.5–11.5)
WBC # BLD: 7.7 E9/L (ref 4.5–11.5)
WBC # BLD: 8.7 E9/L (ref 4.5–11.5)

## 2020-12-31 PROCEDURE — 75825 VEIN X-RAY TRUNK: CPT | Performed by: SURGERY

## 2020-12-31 PROCEDURE — 85730 THROMBOPLASTIN TIME PARTIAL: CPT

## 2020-12-31 PROCEDURE — 2500000003 HC RX 250 WO HCPCS

## 2020-12-31 PROCEDURE — 85384 FIBRINOGEN ACTIVITY: CPT

## 2020-12-31 PROCEDURE — 82962 GLUCOSE BLOOD TEST: CPT

## 2020-12-31 PROCEDURE — 85027 COMPLETE CBC AUTOMATED: CPT

## 2020-12-31 PROCEDURE — 06FD3Z0 FRAGMENTATION OF LEFT COMMON ILIAC VEIN, PERCUTANEOUS APPROACH, ULTRASONIC: ICD-10-PCS | Performed by: SURGERY

## 2020-12-31 PROCEDURE — 36010 PLACE CATHETER IN VEIN: CPT | Performed by: SURGERY

## 2020-12-31 PROCEDURE — 37212 THROMBOLYTIC VENOUS THERAPY: CPT | Performed by: SURGERY

## 2020-12-31 PROCEDURE — 36415 COLL VENOUS BLD VENIPUNCTURE: CPT

## 2020-12-31 PROCEDURE — 3E03317 INTRODUCTION OF OTHER THROMBOLYTIC INTO PERIPHERAL VEIN, PERCUTANEOUS APPROACH: ICD-10-PCS | Performed by: SURGERY

## 2020-12-31 PROCEDURE — 6360000002 HC RX W HCPCS: Performed by: FAMILY MEDICINE

## 2020-12-31 PROCEDURE — 6360000002 HC RX W HCPCS: Performed by: EMERGENCY MEDICINE

## 2020-12-31 PROCEDURE — 99254 IP/OBS CNSLTJ NEW/EST MOD 60: CPT | Performed by: SURGERY

## 2020-12-31 PROCEDURE — 6360000002 HC RX W HCPCS: Performed by: SURGERY

## 2020-12-31 PROCEDURE — B5191ZZ FLUOROSCOPY OF INFERIOR VENA CAVA USING LOW OSMOLAR CONTRAST: ICD-10-PCS | Performed by: SURGERY

## 2020-12-31 PROCEDURE — C1887 CATHETER, GUIDING: HCPCS

## 2020-12-31 PROCEDURE — 6370000000 HC RX 637 (ALT 250 FOR IP): Performed by: SURGERY

## 2020-12-31 PROCEDURE — C1769 GUIDE WIRE: HCPCS

## 2020-12-31 PROCEDURE — 2580000003 HC RX 258: Performed by: SURGERY

## 2020-12-31 PROCEDURE — C1751 CATH, INF, PER/CENT/MIDLINE: HCPCS

## 2020-12-31 PROCEDURE — 6360000004 HC RX CONTRAST MEDICATION: Performed by: RADIOLOGY

## 2020-12-31 PROCEDURE — 74177 CT ABD & PELVIS W/CONTRAST: CPT

## 2020-12-31 PROCEDURE — C1894 INTRO/SHEATH, NON-LASER: HCPCS

## 2020-12-31 PROCEDURE — 2709999900 HC NON-CHARGEABLE SUPPLY

## 2020-12-31 PROCEDURE — 75820 VEIN X-RAY ARM/LEG: CPT | Performed by: SURGERY

## 2020-12-31 PROCEDURE — 6360000002 HC RX W HCPCS

## 2020-12-31 PROCEDURE — U0002 COVID-19 LAB TEST NON-CDC: HCPCS

## 2020-12-31 PROCEDURE — 2000000000 HC ICU R&B

## 2020-12-31 RX ORDER — FERROUS SULFATE 325(65) MG
325 TABLET ORAL 2 TIMES DAILY
Status: DISCONTINUED | OUTPATIENT
Start: 2020-12-31 | End: 2021-01-05 | Stop reason: HOSPADM

## 2020-12-31 RX ORDER — LOPERAMIDE HYDROCHLORIDE 2 MG/1
2 CAPSULE ORAL 4 TIMES DAILY PRN
Status: DISCONTINUED | OUTPATIENT
Start: 2020-12-31 | End: 2021-01-05 | Stop reason: HOSPADM

## 2020-12-31 RX ORDER — MORPHINE SULFATE 2 MG/ML
2 INJECTION, SOLUTION INTRAMUSCULAR; INTRAVENOUS EVERY 4 HOURS PRN
Status: DISCONTINUED | OUTPATIENT
Start: 2020-12-31 | End: 2021-01-01

## 2020-12-31 RX ORDER — CHOLESTYRAMINE 4 G/9G
1 POWDER, FOR SUSPENSION ORAL 2 TIMES DAILY
Status: DISCONTINUED | OUTPATIENT
Start: 2020-12-31 | End: 2021-01-05 | Stop reason: HOSPADM

## 2020-12-31 RX ORDER — MORPHINE SULFATE 4 MG/ML
INJECTION, SOLUTION INTRAMUSCULAR; INTRAVENOUS
Status: DISPENSED
Start: 2020-12-31 | End: 2020-12-31

## 2020-12-31 RX ORDER — MORPHINE SULFATE 2 MG/ML
2 INJECTION, SOLUTION INTRAMUSCULAR; INTRAVENOUS ONCE
Status: COMPLETED | OUTPATIENT
Start: 2020-12-31 | End: 2020-12-31

## 2020-12-31 RX ORDER — HEPARIN SODIUM 1000 [USP'U]/ML
40 INJECTION, SOLUTION INTRAVENOUS; SUBCUTANEOUS PRN
Status: DISCONTINUED | OUTPATIENT
Start: 2020-12-31 | End: 2020-12-31

## 2020-12-31 RX ORDER — LANOLIN ALCOHOL/MO/W.PET/CERES
3 CREAM (GRAM) TOPICAL NIGHTLY PRN
Status: DISCONTINUED | OUTPATIENT
Start: 2020-12-31 | End: 2021-01-05 | Stop reason: HOSPADM

## 2020-12-31 RX ORDER — HEPARIN SODIUM 1000 [USP'U]/ML
80 INJECTION, SOLUTION INTRAVENOUS; SUBCUTANEOUS ONCE
Status: DISCONTINUED | OUTPATIENT
Start: 2020-12-31 | End: 2020-12-31

## 2020-12-31 RX ORDER — FLUTICASONE PROPIONATE 50 MCG
2 SPRAY, SUSPENSION (ML) NASAL DAILY
Status: DISCONTINUED | OUTPATIENT
Start: 2020-12-31 | End: 2021-01-05 | Stop reason: HOSPADM

## 2020-12-31 RX ORDER — CYCLOBENZAPRINE HCL 10 MG
10 TABLET ORAL 3 TIMES DAILY
Status: DISCONTINUED | OUTPATIENT
Start: 2020-12-31 | End: 2021-01-05 | Stop reason: HOSPADM

## 2020-12-31 RX ORDER — HEPARIN SODIUM 1000 [USP'U]/ML
80 INJECTION, SOLUTION INTRAVENOUS; SUBCUTANEOUS PRN
Status: DISCONTINUED | OUTPATIENT
Start: 2020-12-31 | End: 2020-12-31

## 2020-12-31 RX ORDER — METOPROLOL SUCCINATE 25 MG/1
25 TABLET, EXTENDED RELEASE ORAL DAILY
Status: DISCONTINUED | OUTPATIENT
Start: 2020-12-31 | End: 2021-01-05 | Stop reason: HOSPADM

## 2020-12-31 RX ORDER — INSULIN GLARGINE 100 [IU]/ML
25 INJECTION, SOLUTION SUBCUTANEOUS DAILY
Status: DISCONTINUED | OUTPATIENT
Start: 2020-12-31 | End: 2021-01-05 | Stop reason: HOSPADM

## 2020-12-31 RX ORDER — FUROSEMIDE 10 MG/ML
20 INJECTION INTRAMUSCULAR; INTRAVENOUS ONCE
Status: COMPLETED | OUTPATIENT
Start: 2020-12-31 | End: 2020-12-31

## 2020-12-31 RX ORDER — FENOFIBRATE 54 MG/1
54 TABLET ORAL DAILY
Status: DISCONTINUED | OUTPATIENT
Start: 2020-12-31 | End: 2021-01-05 | Stop reason: HOSPADM

## 2020-12-31 RX ORDER — LISINOPRIL 10 MG/1
5 TABLET ORAL DAILY
Status: DISCONTINUED | OUTPATIENT
Start: 2020-12-31 | End: 2021-01-05 | Stop reason: HOSPADM

## 2020-12-31 RX ORDER — DEXTROSE MONOHYDRATE 50 MG/ML
100 INJECTION, SOLUTION INTRAVENOUS PRN
Status: DISCONTINUED | OUTPATIENT
Start: 2020-12-31 | End: 2021-01-05 | Stop reason: HOSPADM

## 2020-12-31 RX ORDER — PRAVASTATIN SODIUM 20 MG
20 TABLET ORAL NIGHTLY
Status: DISCONTINUED | OUTPATIENT
Start: 2020-12-31 | End: 2021-01-05 | Stop reason: HOSPADM

## 2020-12-31 RX ORDER — SODIUM CHLORIDE 0.9 % (FLUSH) 0.9 %
10 SYRINGE (ML) INJECTION PRN
Status: DISCONTINUED | OUTPATIENT
Start: 2020-12-31 | End: 2021-01-05 | Stop reason: HOSPADM

## 2020-12-31 RX ORDER — PREGABALIN 100 MG/1
300 CAPSULE ORAL 2 TIMES DAILY
Status: DISCONTINUED | OUTPATIENT
Start: 2020-12-31 | End: 2021-01-05 | Stop reason: HOSPADM

## 2020-12-31 RX ORDER — ONDANSETRON 2 MG/ML
4 INJECTION INTRAMUSCULAR; INTRAVENOUS EVERY 6 HOURS PRN
Status: DISCONTINUED | OUTPATIENT
Start: 2020-12-31 | End: 2021-01-05 | Stop reason: HOSPADM

## 2020-12-31 RX ORDER — SODIUM CHLORIDE 9 MG/ML
INJECTION, SOLUTION INTRAVENOUS CONTINUOUS
Status: DISCONTINUED | OUTPATIENT
Start: 2020-12-31 | End: 2021-01-04

## 2020-12-31 RX ORDER — DEXTROSE MONOHYDRATE 25 G/50ML
12.5 INJECTION, SOLUTION INTRAVENOUS PRN
Status: DISCONTINUED | OUTPATIENT
Start: 2020-12-31 | End: 2021-01-05 | Stop reason: HOSPADM

## 2020-12-31 RX ORDER — HEPARIN SODIUM 10000 [USP'U]/100ML
500 INJECTION, SOLUTION INTRAVENOUS CONTINUOUS
Status: DISCONTINUED | OUTPATIENT
Start: 2020-12-31 | End: 2021-01-04

## 2020-12-31 RX ORDER — SODIUM CHLORIDE 0.9 % (FLUSH) 0.9 %
10 SYRINGE (ML) INJECTION EVERY 12 HOURS SCHEDULED
Status: DISCONTINUED | OUTPATIENT
Start: 2020-12-31 | End: 2021-01-05 | Stop reason: HOSPADM

## 2020-12-31 RX ORDER — FUROSEMIDE 20 MG/1
20 TABLET ORAL DAILY
Qty: 7 TABLET | Refills: 0 | Status: SHIPPED | OUTPATIENT
Start: 2020-12-31 | End: 2021-08-31

## 2020-12-31 RX ORDER — ACETAMINOPHEN 325 MG/1
650 TABLET ORAL EVERY 4 HOURS PRN
Status: DISCONTINUED | OUTPATIENT
Start: 2020-12-31 | End: 2021-01-05 | Stop reason: HOSPADM

## 2020-12-31 RX ORDER — HYDROXYUREA 500 MG/1
500 CAPSULE ORAL 2 TIMES DAILY
Status: DISCONTINUED | OUTPATIENT
Start: 2020-12-31 | End: 2021-01-05 | Stop reason: HOSPADM

## 2020-12-31 RX ORDER — MORPHINE SULFATE 4 MG/ML
4 INJECTION, SOLUTION INTRAMUSCULAR; INTRAVENOUS ONCE
Status: COMPLETED | OUTPATIENT
Start: 2020-12-31 | End: 2020-12-31

## 2020-12-31 RX ORDER — HEPARIN SODIUM 10000 [USP'U]/100ML
18 INJECTION, SOLUTION INTRAVENOUS CONTINUOUS
Status: DISCONTINUED | OUTPATIENT
Start: 2020-12-31 | End: 2020-12-31

## 2020-12-31 RX ORDER — PANTOPRAZOLE SODIUM 40 MG/1
40 TABLET, DELAYED RELEASE ORAL DAILY
Status: DISCONTINUED | OUTPATIENT
Start: 2020-12-31 | End: 2021-01-05 | Stop reason: HOSPADM

## 2020-12-31 RX ORDER — POTASSIUM CHLORIDE 20 MEQ/1
20 TABLET, EXTENDED RELEASE ORAL 2 TIMES DAILY WITH MEALS
Status: DISCONTINUED | OUTPATIENT
Start: 2020-12-31 | End: 2021-01-05 | Stop reason: HOSPADM

## 2020-12-31 RX ORDER — NICOTINE POLACRILEX 4 MG
15 LOZENGE BUCCAL PRN
Status: DISCONTINUED | OUTPATIENT
Start: 2020-12-31 | End: 2021-01-05 | Stop reason: HOSPADM

## 2020-12-31 RX ORDER — SODIUM CHLORIDE 0.9 % (FLUSH) 0.9 %
SYRINGE (ML) INJECTION
Status: DISPENSED
Start: 2020-12-31 | End: 2021-01-01

## 2020-12-31 RX ORDER — SODIUM CHLORIDE 9 MG/ML
INJECTION, SOLUTION INTRAVENOUS CONTINUOUS PRN
Status: ACTIVE | OUTPATIENT
Start: 2020-12-31 | End: 2021-01-01

## 2020-12-31 RX ORDER — DULOXETIN HYDROCHLORIDE 30 MG/1
30 CAPSULE, DELAYED RELEASE ORAL DAILY
Status: DISCONTINUED | OUTPATIENT
Start: 2020-12-31 | End: 2021-01-05 | Stop reason: HOSPADM

## 2020-12-31 RX ADMIN — FLUTICASONE PROPIONATE 2 SPRAY: 50 SPRAY, METERED NASAL at 18:00

## 2020-12-31 RX ADMIN — HEPARIN SODIUM 500 UNITS/HR: 10000 INJECTION, SOLUTION INTRAVENOUS at 09:40

## 2020-12-31 RX ADMIN — FUROSEMIDE 20 MG: 10 INJECTION, SOLUTION INTRAMUSCULAR; INTRAVENOUS at 00:26

## 2020-12-31 RX ADMIN — INSULIN GLARGINE 25 UNITS: 100 INJECTION, SOLUTION SUBCUTANEOUS at 19:58

## 2020-12-31 RX ADMIN — PREGABALIN 300 MG: 100 CAPSULE ORAL at 19:57

## 2020-12-31 RX ADMIN — CHOLESTYRAMINE 4 G: 4 POWDER, FOR SUSPENSION ORAL at 19:56

## 2020-12-31 RX ADMIN — ALTEPLASE 1 MG/HR: 2.2 INJECTION, POWDER, LYOPHILIZED, FOR SOLUTION INTRAVENOUS at 09:40

## 2020-12-31 RX ADMIN — MORPHINE SULFATE 2 MG: 2 INJECTION, SOLUTION INTRAMUSCULAR; INTRAVENOUS at 19:35

## 2020-12-31 RX ADMIN — MORPHINE SULFATE 4 MG: 4 INJECTION, SOLUTION INTRAMUSCULAR; INTRAVENOUS at 01:27

## 2020-12-31 RX ADMIN — SODIUM CHLORIDE 35 ML/HR: 9 INJECTION, SOLUTION INTRAVENOUS at 09:40

## 2020-12-31 RX ADMIN — PANTOPRAZOLE SODIUM 40 MG: 40 TABLET, DELAYED RELEASE ORAL at 17:59

## 2020-12-31 RX ADMIN — METOPROLOL SUCCINATE 25 MG: 25 TABLET, EXTENDED RELEASE ORAL at 17:59

## 2020-12-31 RX ADMIN — IOPAMIDOL 90 ML: 755 INJECTION, SOLUTION INTRAVENOUS at 01:39

## 2020-12-31 RX ADMIN — MORPHINE SULFATE 2 MG: 2 INJECTION, SOLUTION INTRAMUSCULAR; INTRAVENOUS at 11:45

## 2020-12-31 RX ADMIN — PRAVASTATIN SODIUM 20 MG: 20 TABLET ORAL at 19:56

## 2020-12-31 RX ADMIN — CEFAZOLIN 3 G: 10 INJECTION, POWDER, FOR SOLUTION INTRAVENOUS at 22:14

## 2020-12-31 RX ADMIN — FERROUS SULFATE TAB 325 MG (65 MG ELEMENTAL FE) 325 MG: 325 (65 FE) TAB at 19:56

## 2020-12-31 RX ADMIN — HEPARIN SODIUM 18 UNITS/KG/HR: 10000 INJECTION, SOLUTION INTRAVENOUS at 03:01

## 2020-12-31 RX ADMIN — Medication 10 ML: at 19:56

## 2020-12-31 RX ADMIN — MORPHINE SULFATE 2 MG: 2 INJECTION, SOLUTION INTRAMUSCULAR; INTRAVENOUS at 15:45

## 2020-12-31 RX ADMIN — LISINOPRIL 5 MG: 10 TABLET ORAL at 17:59

## 2020-12-31 RX ADMIN — HYDROXYUREA 500 MG: 500 CAPSULE ORAL at 19:57

## 2020-12-31 RX ADMIN — MORPHINE SULFATE 2 MG: 2 INJECTION, SOLUTION INTRAMUSCULAR; INTRAVENOUS at 04:15

## 2020-12-31 RX ADMIN — CEFAZOLIN 3 G: 10 INJECTION, POWDER, FOR SOLUTION INTRAVENOUS at 10:44

## 2020-12-31 RX ADMIN — CYCLOBENZAPRINE 10 MG: 10 TABLET, FILM COATED ORAL at 17:58

## 2020-12-31 RX ADMIN — DULOXETINE HYDROCHLORIDE 30 MG: 30 CAPSULE, DELAYED RELEASE ORAL at 17:59

## 2020-12-31 RX ADMIN — LOPERAMIDE HYDROCHLORIDE 2 MG: 2 CAPSULE ORAL at 23:37

## 2020-12-31 RX ADMIN — ALTEPLASE 1 MG/HR: 2.2 INJECTION, POWDER, LYOPHILIZED, FOR SOLUTION INTRAVENOUS at 18:40

## 2020-12-31 RX ADMIN — MORPHINE SULFATE 2 MG: 2 INJECTION, SOLUTION INTRAMUSCULAR; INTRAVENOUS at 23:37

## 2020-12-31 RX ADMIN — FENOFIBRATE 54 MG: 54 TABLET ORAL at 17:59

## 2020-12-31 RX ADMIN — POTASSIUM CHLORIDE 20 MEQ: 1500 TABLET, EXTENDED RELEASE ORAL at 17:59

## 2020-12-31 ASSESSMENT — PAIN SCALES - GENERAL
PAINLEVEL_OUTOF10: 9
PAINLEVEL_OUTOF10: 9
PAINLEVEL_OUTOF10: 10

## 2020-12-31 NOTE — ED PROVIDER NOTES
ED PROVIDER NOTE    Chief Complaint   Patient presents with    Leg Pain     pt with increased swelling and pain and redness in BLE x1 day; hx: DVTs and PEs, IVC filter placed x 1-2 months ago       HPI:  12/30/20,   Time: 10:28 PM CATHY Caldwell II is a 50 y.o. male presenting to the ED for bilateral leg swelling and pain. Gradual onset over the past couple of days, progressively worsening. Burning pain in bilateral legs, radiating from the feet to the groins. No aggravating or alleviating factors. Associated paresthesias. No weakness. Patient has known bilateral DVTs and PEs, is on anticoagulation, and status post IVC filter. He was brought in from skilled nursing facility due to his worsening symptoms. He states that they were concerned about the redness of both feet. He is still ambulating at baseline with a cane. He has no new chest pain, shortness of breath, dizziness, lightheadedness, hemoptysis, syncope. Chart review: hx of bilateral DVT s/p catheter directed lysis, PE, s/p IVC filter, on apixaban    Review of Systems:     Review of Systems   Constitutional: Negative for appetite change, chills and fever. HENT: Negative for congestion, rhinorrhea and trouble swallowing. Eyes: Negative for visual disturbance. Respiratory: Negative for cough and shortness of breath. Cardiovascular: Positive for leg swelling. Negative for chest pain. Gastrointestinal: Negative for abdominal pain, blood in stool, diarrhea, nausea and vomiting. Genitourinary: Negative for decreased urine volume, difficulty urinating, dysuria, frequency, hematuria and urgency. Musculoskeletal: Positive for myalgias. Negative for neck pain and neck stiffness. Skin: Positive for color change. Neurological: Positive for numbness.  Negative for dizziness, syncope, weakness, light-headedness and headaches.         --------------------------------------------- PAST HISTORY  Arthritis Father     Diabetes Father     Cancer Maternal Grandfather         Skin    Cancer Paternal Uncle         skin    Diabetes Paternal Grandfather     Heart Disease Maternal Grandmother     Stroke Maternal Aunt        The patients home medications have been reviewed. Allergies: Allergies   Allergen Reactions    Seasonal      HAYFEVER / sneezing,watery eyes    Tramadol Itching           ---------------------------------------------------PHYSICAL EXAM--------------------------------------    BP (!) 152/90   Pulse 89   Resp 18   SpO2 100%     Physical Exam  Vitals signs and nursing note reviewed. Constitutional:       General: He is not in acute distress. Appearance: He is not toxic-appearing. HENT:      Mouth/Throat:      Mouth: Mucous membranes are moist.   Eyes:      General: No scleral icterus. Extraocular Movements: Extraocular movements intact. Pupils: Pupils are equal, round, and reactive to light. Neck:      Musculoskeletal: Normal range of motion and neck supple. No neck rigidity or muscular tenderness. Comments: No JVD  Cardiovascular:      Rate and Rhythm: Normal rate and regular rhythm. Pulses: Normal pulses. Heart sounds: Normal heart sounds. No murmur. Pulmonary:      Effort: Pulmonary effort is normal. No respiratory distress. Breath sounds: Normal breath sounds. No wheezing or rales. Abdominal:      General: There is no distension. Palpations: Abdomen is soft. Tenderness: There is no abdominal tenderness. There is no guarding or rebound. Musculoskeletal: Normal range of motion. General: Swelling (2+ pitting to knees BLE) present. No tenderness. Comments: Radial, DP, and PT pulses 2+ bilaterally. Bilateral dorsal feet mild erythema and warmth, no induration/fluctuance/crepitus   Skin:     General: Skin is warm and dry. Neurological:      Mental Status: He is alert and oriented to person, place, and time. Comments: Strength 5/5 and sensation grossly intact to light touch and equal bilaterally throughout all extremities            -------------------------------------------------- RESULTS -------------------------------------------------  I have personally reviewed all laboratory and imaging results for this patient. Results are listed below. LABS:  Labs Reviewed   CBC WITH AUTO DIFFERENTIAL - Abnormal; Notable for the following components:       Result Value    RBC 3.71 (*)     Hemoglobin 12.2 (*)     .0 (*)     MCHC 31.6 (*)     RDW 18.6 (*)     Platelets 423 (*)     Monocytes % 12.9 (*)     Monocytes Absolute 0.96 (*)     All other components within normal limits   COMPREHENSIVE METABOLIC PANEL W/ REFLEX TO MG FOR LOW K   URINALYSIS WITH MICROSCOPIC   TROPONIN   BRAIN NATRIURETIC PEPTIDE     IMAGING:  CT ABDOMEN PELVIS W IV CONTRAST Additional Contrast? None   Preliminary Result   1. Infrarenal IVC filter in place. Interval placement of left common iliacand external iliac vein stent. There is low-attenuation within the IVC belowthe stent as well as within the left common iliac vein stent relative to theIVC above the    stent. Findings are likely related to thrombus. 2. Fluid-filled loops of small bowel measure up to 2.6 cm in diametersuggesting partial small bowel obstruction or ileus. 3. Status post interval right hemicolectomy and splenectomy. ------------------------- NURSING NOTES AND VITALS REVIEWED ---------------------------   The nursing notes within the ED encounter and vital signs as below have been reviewed by myself. BP (!) 152/90   Pulse 89   Resp 18   SpO2 100%   Oxygen Saturation Interpretation: Normal    The patients available past medical records and past encounters were reviewed.         ------------------------------ ED COURSE/MEDICAL DECISION MAKING----------------------  Medications   oxyCODONE-acetaminophen (PERCOCET) 5-325 MG per tablet 2 tablet (2 tablets Oral Given 20 7320)   furosemide (LASIX) injection 20 mg (20 mg Intravenous Given 20 0026)     Consultations:             Vascular surgery    Counseling: The emergency provider has spoken with the patient and discussed todays results, in addition to providing specific details for the plan of care and counseling regarding the diagnosis and prognosis. Questions are answered at this time and they are agreeable with the plan. ED Course/Medical Decision Makin y.o. male here with bilateral leg pain and swelling in the setting of bilateral DVTs and PE status post IVC filter, anticoagulated on Eliquis. bilateral lower extremities are neurovascularly intact. Patient is hemodynamically stable, breathing comfortably on room air, and in no acute distress. Skin exam is not suggestive of cellulitis. He has IVC filter and is anticoagulated. I did discuss his case w/ Dr Stephanie Winters who recommended CT venogram of abdomen/pelvis and admission for further evaluation given that he required catheter directed lysis during his prior episode of DVTs. imaging concerning for thrombus below iliac stent. Started heparin gtt. Discussed findings and expected course of care and patient/surrogate agreed with plan for admission for further evaluation and management.       --------------------------------- IMPRESSION AND DISPOSITION ---------------------------------    IMPRESSION  1. Acute deep vein thrombosis (DVT) of other specified vein of both lower extremities (HCC)    2. Bilateral leg edema        DISPOSITION  Disposition: Admit to med/surg floor  Patient condition is stable    NOTE: This report was transcribed using voice recognition software.  Every effort was made to ensure accuracy; however, inadvertent computerized transcription errors may be present    Aretha Adams MD  Attending Emergency Physician          Aretha Adams MD  [id number]       Aretha Adams MD  20 [phone]

## 2020-12-31 NOTE — OP NOTE
Cardiovascular Lab Procedure Report    Heath Jackson  1975    Date : 12/31/2020  Surgeon: Kings Odell M.D. Pre-procedure Diagnosis: Left lower extremity DVT  Post-procedure Diagnosis: Same  Procedure:     Left Popliteal vein access under US guidance   55713 Placement of catheter in IVC   25720 Venogram of IVC and Left lower extremity   41218 Placement of EKOS Lysis catheter and US wire (40 cm infusion length)   Anesthesia: Local with IV sedation  Assistants: Cath Lab Staff  Estimated Blood Loss: Minimal  Complications: none  Findings[de-identified]    Left    Inferior Vena Cava Thrombosed up to level filter   Common Iliac Vein Thrombosed   External Iliac Vein Thrombosed   Internal Iliac Vein Thrombosed   Common Femoral Vein Thrombosed   Superficial Femoral Vein Patent   AK Popliteal Vein Patent     Procedure Details :  Timeout preformed identifying pt and procedure. Left popliteal region prepped and draped in sterile fashion. Patient given sedation as needed throughout the case. Left popliteal vein was noted to be patent and was accessed under ultrasound guidance after infiltrating with local.      Micropuncture placed, exchanged out for 5 fr sheath. Advantage glide wire and glide catheter advanced into IVC. Venogram preformed of Left lower extremity and inferior vena cava. A 6 fr sheath was exchanged and than the EKOS catheter was placed over the wire across the Left iliac vein down to the level of the superficial femoral vein. The US wire was than placed after removing the advantage glide wire. The TPA at 1 mg./hr to drug port, saline at 35 ml/hr to the coolant port, and 500 units/hr of heparin to the sheath.      Plan  Will take back to angio tomorrow  Serial labs  Neuro exams    Kings Odell

## 2020-12-31 NOTE — PROGRESS NOTES
CVICU Admission Note    Name: Juan Lau  MRN: [id number]    CC: Postoperative Critical Care Management     Indication for Surgery/Procedure: LLE DVT     Important/Relevant PMH/PSH: PE, and IVC filter placement. He had L LE dvt lysis and IVC lysis done 10/2020 and subsequent L Iliac vein stent placement. Admitted 12/31/2020 presented to ED for bilateral leg swelling and pain. Procedure: 12/31/2020 Catheter directed lysis       Physical Exam:    BP (!) 118/58   Pulse 108   Temp 98.7 °F (37.1 °C) (Oral)   Resp 13   Wt 270 lb (122.5 kg)   SpO2 93%   BMI 35.62 kg/m²     Recent Labs     12/31/20  0250   WBC 7.7   RBC 3.97   HGB 13.3   HCT 40.7   .5*   MCH 33.5   MCHC 32.7   RDW 18.3*   *   MPV 8.6     Recent Labs     12/30/20  2246      K 4.3      CO2 27   BUN 10   CREATININE 0.8   GLUCOSE 99   CALCIUM 9.4     General Appearance: Arrived to ICU in stable condition, EKOS catheter running   Eyes: PERRL  Pulmonary: CTA bilaterally. No wheezes, no accessory muscle use noted on room air   Cardiovascular: RRR, no heaves or thrills palpated   Telemetry: ST  Abdomen: Soft, nontender   Extremities: Palpable pulses all extremities, BLE edematous left > right  Neurologic/Psych: Alert and oriented x3, following commands   Skin: Warm and dry   Incision: left popliteal catheter site with DSD    Assessment/Plan: Day of Surgery     1.  LLE DVT s/p catheter directed lysis    - Frequent neurovascular checks, monitor insertion site for signs of bleeding/hematoma    - tPA/heparin infusing via EKOS catheter   - Monitor H/H, APTT, fibrinogen q 6 hours while tPA infusing  - NPO for now; bedrest  - Ancef while catheters in place  - PRN Morphine for pain   - Heme/Onc consulted--concern for failure of eliquis    - Plan to return to CV for repeat venogram tomorrow       Electronically signed by IGOR Nayak CNP on 12/31/2020 at 4:31 PM

## 2020-12-31 NOTE — ED NOTES
Bed: 21  Expected date:   Expected time:   Means of arrival:   Comments:     Ely Chan RN  12/30/20 [id number]

## 2020-12-31 NOTE — H&P
Hospital Medicine History & Physical      PCP: IGOR Gardner - CNP    Date of Admission: 12/30/2020    Date of Service: Pt seen/examined on 12/31/20 and Admitted to Inpatient with expected LOS greater than two midnights due to medical therapy. Chief Complaint:  Bilateral Leg pain      History Of Present Illness:    50 y.o. male with PMH of DM, HTN, HLD, chronic pain, DVTs and PEs on anticoagulation with IVC who presented to [address] [place] [address] with bilateral leg pain. He states he has had bilateral leg pain and swelling radiating from his groin to his ankles for the past two days that has not improved and described as burning pain. He is on anticoagulation with an IVC in place for known DVTs and PEs. He was sent from the facility for worsening symptoms.      Past Medical History:          Diagnosis Date    Accident 11/2019    stepped on nail rt ft about 1 month ago- healed per patient    Acute thrombosis of inferior vena cava ([address] 10/10/2020    SIMÓN (acute kidney injury) ([address] 2008 apx    kidney bruised due to fall / Juventino Lazar    Allergic rhinitis     Chronic back pain     Depression     Diabetes mellitus ([address]     Difficulty sleeping     at times    Displacement of lumbar intervertebral disc without myelopathy     Fibromyalgia     Fractured rib     2008 / healed    H/O seasonal allergies     Head injury 1980'S apx    no residual s/s    Hyperlipidemia     Hypertension     Obesity (BMI 35.0-39.9 without comorbidity)     bmi 39.2  weight 296 #    Osteoarthritis     Thoracic or lumbosacral neuritis or radiculitis, unspecified        Past Surgical History:          Procedure Laterality Date    COLONOSCOPY N/A 9/5/2020    COLONOSCOPY WITH BIOPSY performed by Mart Crockett MD at [address] [facility] CT PTC NEW ACCESS  8/3/2020    CT PTC NEW ACCESS 8/3/2020 [facility] CT    FOOT SURGERY Right 1985    to treat shattered bones    HERNIA REPAIR  2001    DOUBLE HERNIA    HERNIA REPAIR Right 12/9/2019    LAPAROSCOPIC RIGHT INGUINAL HERNIA REPAIR, MESH 10x15 cm PLACEMENT performed by Mukund Quiros MD at [address] Left 4/11/2016    ILIAC ARTERY STENT INSERTION N/A 10/11/2020    S/P ILIAC STENT PLACEMENT VISUALIZATION performed by Mela Mccoy MD at [place] [address] N/A 9/18/2020    LAPAROTOMY EXPLORATORY, CHOLECYSTECTOMY, BOWEL RESECTION, right darian colectomy, partial omentectomy, and splenectomy performed by Gela Vela MD at [address] FL[address] DX W/COLLJ [address] PFRMD N/A 5/7/2018    COLONOSCOPY DIAGNOSTIC performed by Herlinda Ribeiro MD at [address] [facility] Left 2014    Dr. Nazanin Gr ARTHROSCOPY Left 11 21 14    SHOULDER SURGERY  2001 &2007    RIGHT AND LEFT repair of tears    TOTAL HIP ARTHROPLASTY Right 10/31/2016    Total right hip  Darylene Commander, MD    UPPER GASTROINTESTINAL ENDOSCOPY N/A 9/4/2020    EGD DIAGNOSTIC ONLY performed by Madalyn Sherman MD at [facility] 1 2007    LEFT WRIST       Medications Prior to Admission:      Prior to Admission medications    Medication Sig Start Date End Date Taking?  Authorizing Provider   furosemide (LASIX) 20 MG tablet Take 1 tablet by mouth daily for 7 days 12/31/20 1/7/21 Yes Artandrew Clements MD   fluticasone [facility]) 50 MCG/ACT nasal spray instill 2 sprays into each nostril once daily 11/30/20   No Gonzalez, APRN - CNP   Elastic Bandages & Supports (JOBST KNEE HIGH COMPRESSION SM) MISC Knee high compression stockings, 20-30 mm/Hg 11/10/20   Ab Jackson MD   insulin lispro (HUMALOG) 100 UNIT/ML injection vial Inject 0-3 Units into the skin nightly **Corrective Bedtime (50%) Low Dose Algorithm**   Glucose: Dose:                No Insulin   140-249 1 Unit   250-349 2 Units   Over 350 3 Units 10/15/20   Ana Maria Smalls MD   potassium chloride (KLOR-CON M) 20 MEQ extended release tablet Take 1 tablet by mouth 2 times daily (with meals) 10/15/20   Joe Knox MD   apixaban Samantha Amara) 5 MG TABS tablet Take 1 tablet by mouth 2 times daily 10/15/20   Joe Knox MD   lisinopril (PRINIVIL;ZESTRIL) 5 MG tablet Take 1 tablet by mouth daily 10/16/20   Joe Knox MD   metoprolol succinate (TOPROL XL) 25 MG extended release tablet Take 1 tablet by mouth daily 10/16/20   Joe Knox MD   pregabalin (LYRICA) 150 MG capsule Take 300 mg by mouth 2 times daily.     Historical Provider, MD   cholestyramine (QUESTRAN) 4 g packet Take 1 packet by mouth 2 times daily 9/26/20   Hang Nieves MD   glucose (GLUTOSE) 40 % GEL Take 37.5 mLs by mouth as needed (low sugar) 9/26/20   Hang Nieves MD   insulin glargine (LANTUS) 100 UNIT/ML injection vial Inject 25 Units into the skin Daily 9/27/20   Hang Nieves MD   melatonin 3 MG TABS tablet Take 3 mg by mouth nightly as needed (sleep)    Historical Provider, MD   cyclobenzaprine (FLEXERIL) 10 MG tablet Take 10 mg by mouth three times daily    Historical Provider, MD   fenofibrate (TRICOR) 54 MG tablet Take 54 mg by mouth daily    Historical Provider, MD   ferrous sulfate (IRON 325) 325 (65 Fe) MG tablet Take 325 mg by mouth 2 times daily    Historical Provider, MD   hydroxyurea (HYDREA) 500 MG chemo capsule Take 500 mg by mouth 2 times daily    Historical Provider, MD   loperamide (IMODIUM) 2 MG capsule Take 2 mg by mouth 4 times daily as needed for Diarrhea    Historical Provider, MD   insulin lispro (HUMALOG) 100 UNIT/ML injection vial Inject 3 Units into the skin 3 times daily (before meals) Injects 3 units three times daily before meals in addition to following sliding scale  : 0 units  141-180: 1 unit  181-220: 2 units  221-260: 3 units  261-300: 4 units  301-340: 5 units  >341: 6 units and call MD    Historical Provider, MD   pantoprazole (PROTONIX) 40 MG tablet Take 40 mg by mouth daily    Historical Provider, MD   DULoxetine (CYMBALTA) 30 MG extended release capsule Take 1 capsule by mouth daily 5/14/20   Boykin Spurling, APRN - CNP   pravastatin (PRAVACHOL) 20 MG tablet Take 1 tablet by mouth nightly 5/14/20   Boykin Spurling, APRN - CNP       Allergies:  Seasonal and Tramadol    Social History:      The patient [facility] at [facility]   reports that he has never smoked. His smokeless tobacco use includes chew. ETOH:   reports previous alcohol use. Family History:       Positive as follows:        Problem Relation Age of Onset    Hypertension Mother     Arthritis Father     Diabetes Father     Cancer Maternal Grandfather         Skin    Cancer Paternal Uncle         skin    Diabetes Paternal Grandfather     Heart Disease Maternal Grandmother     Stroke Maternal Aunt        REVIEW OF SYSTEMS:   Pertinent positives as noted in the HPI. All other systems reviewed and negative. PHYSICAL EXAM:    BP (!) 114/54   Pulse 94   Temp 98.5 °F (36.9 °C)   Resp 18   Wt 270 lb (122.5 kg)   SpO2 93%   BMI 35.62 kg/m²     General appearance:  No apparent distress, appears stated age and cooperative. HEENT:  Normal cephalic, atraumatic without obvious deformity. Pupils equal, round, and reactive to light. Extra ocular muscles intact. Conjunctivae/corneas clear. Neck: Supple, with full range of motion. No jugular venous distention. Trachea midline. Respiratory:  Normal respiratory effort. Clear to auscultation, bilaterally without Rales/Wheezes/Rhonchi. Cardiovascular:  Regular rate and rhythm with normal S1/S2 without murmurs, rubs or gallops. Abdomen: Soft, non-tender, non-distended with normal bowel sounds. Musculoskeletal:  No clubbing, cyanosis or edema bilaterally. Full range of motion without deformity. Skin: Skin color, texture, turgor normal.  No rashes or lesions. Neurologic:  Neurovascularly intact without any focal sensory/motor deficits.    Psychiatric:  Alert and oriented, thought content appropriate, normal insight  Capillary Refill: Brisk,< 3 seconds   Peripheral Pulses: +2 palpable, equal bilaterally     Labs:     Recent Labs     12/30/20 2246 12/31/20  0250   WBC 7.5 7.7   HGB 12.2* 13.3   HCT 38.6 40.7   * 556*     Recent Labs     12/30/20 2246      K 4.3      CO2 27   BUN 10   CREATININE 0.8   CALCIUM 9.4     Recent Labs     12/30/20 2246   AST 20   ALT 30   BILITOT <0.2   ALKPHOS 126     No results for input(s): INR in the last 72 hours. Recent Labs     12/30/20 2246   TROPONINI <0.01       Urinalysis:      Lab Results   Component Value Date    NITRU Negative 12/30/2020    WBCUA NONE 12/30/2020    BACTERIA NONE SEEN 12/30/2020    RBCUA NONE 12/30/2020    BLOODU Negative 12/30/2020    SPECGRAV <=1.005 12/30/2020    GLUCOSEU Negative 12/30/2020     CT ABDOMEN PELVIS W IV CONTRAST Additional Contrast? None   Final Result   1. Infrarenal IVC filter in place. Interval placement of left common iliac   and external iliac vein stent. There is low-attenuation within the IVC below   the stent as well as within the left common iliac vein stent relative to the   IVC above the stent. Findings are likely related to thrombus. 2. Fluid-filled loops of small bowel measure up to 2.6 cm in diameter   suggesting partial small bowel obstruction or ileus. 3. Status post interval right hemicolectomy and splenectomy.             ASSESSMENT:    Active Hospital Problems    Diagnosis Date Noted    DVT, lower extremity, recurrent, bilateral ([address] [I82.403] 12/31/2020    Thrombocytosis ([address] [D47.3] 08/10/2020    Type 2 diabetes mellitus ([address] [E11.9] 04/08/2016    Class 1 obesity in adult [E66.9] 12/17/2015    Essential hypertension [I10] 10/16/2015       PLAN:    Pain control  Daily weights  I/Os  Neurovascular checks  BGL control  Remainder per critical care team    DVT Prophylaxis: heparin  Diet: DIET CARB CONTROL;  Code Status: Full Code    PT/OT Eval Status: n/a    Dispo - ICU       Ana Zheng CNP    Thank you Ginna Vides

## 2020-12-31 NOTE — CONSULTS
Vascular Surgery Consultation Note    Reason for Consult:  L LE swelling    HPI : This is a 50 y.o. male admitted on 12/30/2020 10:15 PM with onset of L LE swelling since 12/24/20. Patient has known hx of PE, and IVC filter placement. He had L LE dvt lysis and IVC lysis done 10/2020 and subsequent L Iliac vein stent placement. He had been doing better and has been chronically taking eliquis. He acutely developed more L LE swelling and pain. His pain is 7/10 ,achey and sore. Difficult to walk because of the pain. Similar though not as bad as previous episode. Denies bleeding issues, falls, trauma. Vascular surgery is consulted in regards to L LE swelling.       ROS : All others Negative if blank [], Positive if [x]  General Urinary   [] Fevers [] Hematuria   [] Chills [] Dysuria   [] Weight Loss Vascular   Skin [] Claudication   [] Tissue Loss [] Rest Pain   Eyes Neurologic   [] Wears Glasses/Contacts [] Stroke/TIA   [] Vision Changes [] Focal weakness   Respiratory [] Slurred Speech    [] Shortness of breath ENT   Cardiovascular [] Difficulty swallowing   [] Chest Pain Endocrine    [] Shortness of breath with exertion [] Increased Thirst   Gastrointestinal    [] Abdominal Pain    [] Melena   [] Hematochezia         Past Medical History:   Diagnosis Date    Accident 11/2019    stepped on nail rt ft about 1 month ago- healed per patient    Acute thrombosis of inferior vena cava ([address] 10/10/2020    SIMÓN (acute kidney injury) ([address] 2008 apx    kidney bruised due to fall / Augustin Walter    Allergic rhinitis     Chronic back pain     Depression     Diabetes mellitus ([address]     Difficulty sleeping     at times    Displacement of lumbar intervertebral disc without myelopathy     Fibromyalgia     Fractured rib     2008 / healed    H/O seasonal allergies     Head injury 1980'S apx    no residual s/s    Hyperlipidemia     Hypertension     Obesity (BMI 35.0-39.9 without comorbidity)     bmi 39.2  weight 296 #    Osteoarthritis     Thoracic or lumbosacral neuritis or radiculitis, unspecified         Past Surgical History:   Procedure Laterality Date    COLONOSCOPY N/A 9/5/2020    COLONOSCOPY WITH BIOPSY performed by Mart Crockett MD at [address] [facility] [address] PTC NEW ACCESS  8/3/2020    CT PTC NEW ACCESS 8/3/2020 [facility] CT    FOOT SURGERY Right 1985    to treat shattered bones    HERNIA REPAIR  2001    DOUBLE HERNIA    HERNIA REPAIR Right 12/9/2019    LAPAROSCOPIC RIGHT INGUINAL HERNIA REPAIR, MESH 10x15 cm PLACEMENT performed by Sanjeev Pfeiffer MD at [address] [facility] Left 4/11/2016    ILIAC ARTERY STENT INSERTION N/A 10/11/2020    S/P ILIAC STENT PLACEMENT VISUALIZATION performed by Eddie Urrutia MD at [place] [address] N/A 9/18/2020    LAPAROTOMY EXPLORATORY, CHOLECYSTECTOMY, BOWEL RESECTION, right darian colectomy, partial omentectomy, and splenectomy performed by Mart Crockett MD at [address] FL[address] DX W/COLLJ [address] PFRMD N/A 5/7/2018    COLONOSCOPY DIAGNOSTIC performed by Judah Santacruz MD at [address] [place] [address] Left 2014    Dr. Nirali Muñiz ARTHROSCOPY Left 11 21 14    SHOULDER SURGERY  2001 &2007    RIGHT AND LEFT repair of tears    TOTAL HIP ARTHROPLASTY Right 10/31/2016    Total right hip  Kamilla Davidson MD    UPPER GASTROINTESTINAL ENDOSCOPY N/A 9/4/2020    EGD DIAGNOSTIC ONLY performed by Arminda Wheeler MD at [facility] 1  2007    LEFT WRIST     Current Medications:    heparin (PORCINE) Infusion 18 Units/kg/hr (12/31/20 0301)    dextrose      alteplase (ACTIVASE) 10 mg in 0.9% sodium chloride infusion 100 mL      heparin (PORCINE) Infusion      sodium chloride        heparin (porcine), heparin (porcine), morphine, glucose, dextrose, glucagon (rDNA), dextrose    heparin (porcine)  80 Units/kg (Order-Specific) Intravenous Once    insulin lispro 0-10 Units Subcutaneous 4x Daily AC & HS      Allergies:  Seasonal and Tramadol  Social History     Socioeconomic History    Marital status:      Spouse name: Not on file    Number of children: Not on file    Years of education: Not on file    Highest education level: Not on file   Occupational History    Occupation: disabled from     Social Needs    Financial resource strain: Not on file    Food insecurity     Worry: Not on file     Inability: Not on file   [employer] needs     Medical: Not on file     Non-medical: Not on file   Tobacco Use    Smoking status: Never Smoker    Smokeless tobacco: Current User     Types: Chew   Substance and Sexual Activity    Alcohol use: Not Currently     Alcohol/week: 0.0 standard drinks     Frequency: Monthly or less    Drug use: No    Sexual activity: Not Currently   Lifestyle    Physical activity     Days per week: Not on file     Minutes per session: Not on file    Stress: Not on file   Relationships    Social connections     Talks on phone: Not on file     Gets together: Not on file     Attends Scientologist service: Not on file     Active member of club or organization: Not on file     Attends meetings of clubs or organizations: Not on file     Relationship status: Not on file    Intimate partner violence     Fear of current or ex partner: Not on file     Emotionally abused: Not on file     Physically abused: Not on file     Forced sexual activity: Not on file   Other Topics Concern    Not on file   Social History Narrative    Not on file     Family History   Problem Relation Age of Onset    Hypertension Mother     Arthritis Father     Diabetes Father     Cancer Maternal Grandfather         Skin    Cancer Paternal Uncle         skin    Diabetes Paternal Grandfather     Heart Disease Maternal Grandmother     Stroke Maternal Aunt      PHYSICAL EXAM:    BP (!) 114/54   Pulse 94   Temp 98.5 °F (36.9 °C)   Resp 18   Wt 270 lb (122.5 kg)   SpO2 93%   BMI 35.62 kg/m²   CONSTITUTIONAL:   Awake, alert, cooperative  PSYCHIATRIC :  Oriented to time, place and person      Appropriate insight to disease process  EYES: Lids and lashes normal  ENT:  External ears and nose without lesions   Hearing deficits notnoted  NECK: Supple, symmetrical, trachea midline   Thyroid goiter not appreciated  LUNGS:  No increased work of breathing                 Good respiratory excursion  CARDIOVASCULAR:  regular rate and rhythm   ABDOMEN:  soft, non-distended, non-tender   Aorta is not palpable  Lymphatics : Cervical lymphadenopathy notnotedd     Femoral lymphadenopathy notnoted  SKIN:   Normal skin color   Texture and turgor normal, no induration  EXTREMITIES:   R UE 5/5 strength   No cyanosis noted in nail beds  L UE 5/5 strength   No cyanosis noted in nail beds  R LE Edema present   Open wounds absent   L LE Edema present, ttp   Open wound absent  R femoral 2+ L femoral 2+   R posterior tibial 1+ L posterior tibial 1+   R dorsalis pedis 1+ L dorsalis pedis 1+     LABS:    Lab Results   Component Value Date    WBC 7.7 12/31/2020    HGB 13.3 12/31/2020    HCT 40.7 12/31/2020     (H) 12/31/2020    PROTIME 12.8 (H) 11/18/2020    INR 1.1 11/18/2020    K 4.3 12/30/2020    BUN 10 12/30/2020    CREATININE 0.8 12/30/2020     Lab Results   Component Value Date    LABA1C 5.1 11/19/2020     No results found for: EAG  Lab Results   Component Value Date    LABALBU 4.4 12/30/2020        Radiology pertinent to vascular surgery evaluation reviewed    Assesment/Plan  IVC Thrombosis  L Iliofemoral DVT  · I feel that patient melva  a good candidate for placement of TPA lysis catheters , L LE venogram  · Etiology is unknown, concerned may be failure of eliquis  · discussed with patient pathophysiology of DVT, PE   · All ?s answered  · I discussed with patient and family members at bedside options of              No intervention - Full dose anticoagulation              Catheter directed lysis of pulmonary embolus with TPA  · they understood risk of bleeding associated with TPA - brain hemorrhagic , GI bleeding  · Pt explained risks, benefits, complications, procedure, alternatives, options, and expected outcomes and was agreeable to proceed with lysis catheter placement  · reviewed the procedure with the patient and family as available.  I discussed the procedure, risks, benefits, complications, and alternatives of the procedure.  They understand and consent.  All questions were answered    Bennie Amaral

## 2020-12-31 NOTE — ED NOTES
Bed: 18B-18  Expected date:   Expected time:   Means of arrival:   Comments:  ems     [address] [facility] 121, RN  12/30/20 [id number]

## 2020-12-31 NOTE — PROGRESS NOTES
Report received, assumed care at 1600. EKOS on, yellow light blinking, drips running appropriately. Left pedal pulse with doppler. Left foot warm, capillary refill less thank 3 seconds. No bleeding or drainage from site.

## 2021-01-01 ENCOUNTER — ANESTHESIA (OUTPATIENT)
Dept: OPERATING ROOM | Age: 46
DRG: 180 | End: 2021-01-01
Payer: COMMERCIAL

## 2021-01-01 VITALS
SYSTOLIC BLOOD PRESSURE: 120 MMHG | DIASTOLIC BLOOD PRESSURE: 83 MMHG | OXYGEN SATURATION: 99 % | RESPIRATION RATE: 22 BRPM

## 2021-01-01 LAB
ANION GAP SERPL CALCULATED.3IONS-SCNC: 17 MMOL/L (ref 7–16)
APTT: 33.4 SEC (ref 24.5–35.1)
APTT: 35.5 SEC (ref 24.5–35.1)
APTT: 35.8 SEC (ref 24.5–35.1)
APTT: 36.5 SEC (ref 24.5–35.1)
BUN BLDV-MCNC: 10 MG/DL (ref 6–20)
CALCIUM SERPL-MCNC: 9.7 MG/DL (ref 8.6–10.2)
CHLORIDE BLD-SCNC: 100 MMOL/L (ref 98–107)
CO2: 21 MMOL/L (ref 22–29)
CREAT SERPL-MCNC: 0.8 MG/DL (ref 0.7–1.2)
FIBRINOGEN: 270 MG/DL (ref 225–540)
FIBRINOGEN: 318 MG/DL (ref 225–540)
FIBRINOGEN: 321 MG/DL (ref 225–540)
FIBRINOGEN: 383 MG/DL (ref 225–540)
GFR AFRICAN AMERICAN: >60
GFR NON-AFRICAN AMERICAN: >60 ML/MIN/1.73
GLUCOSE BLD-MCNC: 94 MG/DL (ref 74–99)
HCT VFR BLD CALC: 36.5 % (ref 37–54)
HCT VFR BLD CALC: 37.4 % (ref 37–54)
HCT VFR BLD CALC: 37.5 % (ref 37–54)
HCT VFR BLD CALC: 39.7 % (ref 37–54)
HEMOGLOBIN: 11.9 G/DL (ref 12.5–16.5)
HEMOGLOBIN: 12.1 G/DL (ref 12.5–16.5)
HEMOGLOBIN: 12.2 G/DL (ref 12.5–16.5)
HEMOGLOBIN: 13.3 G/DL (ref 12.5–16.5)
MCH RBC QN AUTO: 33.2 PG (ref 26–35)
MCH RBC QN AUTO: 33.4 PG (ref 26–35)
MCH RBC QN AUTO: 33.6 PG (ref 26–35)
MCH RBC QN AUTO: 34.4 PG (ref 26–35)
MCHC RBC AUTO-ENTMCNC: 32.4 % (ref 32–34.5)
MCHC RBC AUTO-ENTMCNC: 32.5 % (ref 32–34.5)
MCHC RBC AUTO-ENTMCNC: 32.6 % (ref 32–34.5)
MCHC RBC AUTO-ENTMCNC: 33.5 % (ref 32–34.5)
MCV RBC AUTO: 102.2 FL (ref 80–99.9)
MCV RBC AUTO: 102.6 FL (ref 80–99.9)
MCV RBC AUTO: 103.1 FL (ref 80–99.9)
MCV RBC AUTO: 103.3 FL (ref 80–99.9)
METER GLUCOSE: 101 MG/DL (ref 74–99)
METER GLUCOSE: 123 MG/DL (ref 74–99)
METER GLUCOSE: 154 MG/DL (ref 74–99)
METER GLUCOSE: 90 MG/DL (ref 74–99)
METER GLUCOSE: 95 MG/DL (ref 74–99)
PDW BLD-RTO: 18.2 FL (ref 11.5–15)
PDW BLD-RTO: 18.2 FL (ref 11.5–15)
PDW BLD-RTO: 18.3 FL (ref 11.5–15)
PDW BLD-RTO: 18.6 FL (ref 11.5–15)
PLATELET # BLD: 364 E9/L (ref 130–450)
PLATELET # BLD: 383 E9/L (ref 130–450)
PLATELET # BLD: 384 E9/L (ref 130–450)
PLATELET # BLD: 454 E9/L (ref 130–450)
PMV BLD AUTO: 8.7 FL (ref 7–12)
PMV BLD AUTO: 8.8 FL (ref 7–12)
PMV BLD AUTO: 9 FL (ref 7–12)
PMV BLD AUTO: 9.1 FL (ref 7–12)
POTASSIUM SERPL-SCNC: 4.6 MMOL/L (ref 3.5–5)
RBC # BLD: 3.54 E12/L (ref 3.8–5.8)
RBC # BLD: 3.62 E12/L (ref 3.8–5.8)
RBC # BLD: 3.67 E12/L (ref 3.8–5.8)
RBC # BLD: 3.87 E12/L (ref 3.8–5.8)
SODIUM BLD-SCNC: 138 MMOL/L (ref 132–146)
WBC # BLD: 6.7 E9/L (ref 4.5–11.5)
WBC # BLD: 7.1 E9/L (ref 4.5–11.5)
WBC # BLD: 7.9 E9/L (ref 4.5–11.5)
WBC # BLD: 8.2 E9/L (ref 4.5–11.5)

## 2021-01-01 PROCEDURE — 37213 THROMBLYTIC ART/VEN THERAPY: CPT | Performed by: SURGERY

## 2021-01-01 PROCEDURE — 2580000003 HC RX 258

## 2021-01-01 PROCEDURE — 6370000000 HC RX 637 (ALT 250 FOR IP): Performed by: SURGERY

## 2021-01-01 PROCEDURE — 80048 BASIC METABOLIC PNL TOTAL CA: CPT

## 2021-01-01 PROCEDURE — 6360000002 HC RX W HCPCS: Performed by: SURGERY

## 2021-01-01 PROCEDURE — 85384 FIBRINOGEN ACTIVITY: CPT

## 2021-01-01 PROCEDURE — 36415 COLL VENOUS BLD VENIPUNCTURE: CPT

## 2021-01-01 PROCEDURE — 2000000000 HC ICU R&B

## 2021-01-01 PROCEDURE — 3700000001 HC ADD 15 MINUTES (ANESTHESIA): Performed by: SURGERY

## 2021-01-01 PROCEDURE — 2580000003 HC RX 258: Performed by: SURGERY

## 2021-01-01 PROCEDURE — 2709999900 HC NON-CHARGEABLE SUPPLY: Performed by: SURGERY

## 2021-01-01 PROCEDURE — 85027 COMPLETE CBC AUTOMATED: CPT

## 2021-01-01 PROCEDURE — 6360000004 HC RX CONTRAST MEDICATION: Performed by: SURGERY

## 2021-01-01 PROCEDURE — 3700000000 HC ANESTHESIA ATTENDED CARE: Performed by: SURGERY

## 2021-01-01 PROCEDURE — 3600000002 HC SURGERY LEVEL 2 BASE: Performed by: SURGERY

## 2021-01-01 PROCEDURE — 3600000012 HC SURGERY LEVEL 2 ADDTL 15MIN: Performed by: SURGERY

## 2021-01-01 PROCEDURE — 82962 GLUCOSE BLOOD TEST: CPT

## 2021-01-01 PROCEDURE — 85730 THROMBOPLASTIN TIME PARTIAL: CPT

## 2021-01-01 RX ORDER — FENTANYL CITRATE 50 UG/ML
50 INJECTION, SOLUTION INTRAMUSCULAR; INTRAVENOUS EVERY 5 MIN PRN
Status: DISCONTINUED | OUTPATIENT
Start: 2021-01-01 | End: 2021-01-01 | Stop reason: HOSPADM

## 2021-01-01 RX ORDER — FENTANYL CITRATE 50 UG/ML
25 INJECTION, SOLUTION INTRAMUSCULAR; INTRAVENOUS EVERY 5 MIN PRN
Status: DISCONTINUED | OUTPATIENT
Start: 2021-01-01 | End: 2021-01-01 | Stop reason: HOSPADM

## 2021-01-01 RX ORDER — SODIUM CHLORIDE 9 MG/ML
INJECTION, SOLUTION INTRAVENOUS CONTINUOUS PRN
Status: DISCONTINUED | OUTPATIENT
Start: 2021-01-01 | End: 2021-01-01 | Stop reason: SDUPTHER

## 2021-01-01 RX ORDER — MORPHINE SULFATE 4 MG/ML
4 INJECTION, SOLUTION INTRAMUSCULAR; INTRAVENOUS
Status: DISCONTINUED | OUTPATIENT
Start: 2021-01-01 | End: 2021-01-05

## 2021-01-01 RX ORDER — OXYCODONE HYDROCHLORIDE AND ACETAMINOPHEN 5; 325 MG/1; MG/1
1 TABLET ORAL EVERY 4 HOURS PRN
Status: DISCONTINUED | OUTPATIENT
Start: 2021-01-01 | End: 2021-01-05 | Stop reason: HOSPADM

## 2021-01-01 RX ORDER — ONDANSETRON 2 MG/ML
4 INJECTION INTRAMUSCULAR; INTRAVENOUS
Status: DISCONTINUED | OUTPATIENT
Start: 2021-01-01 | End: 2021-01-01 | Stop reason: HOSPADM

## 2021-01-01 RX ADMIN — PRAVASTATIN SODIUM 20 MG: 20 TABLET ORAL at 19:43

## 2021-01-01 RX ADMIN — CYCLOBENZAPRINE 10 MG: 10 TABLET, FILM COATED ORAL at 19:43

## 2021-01-01 RX ADMIN — FERROUS SULFATE TAB 325 MG (65 MG ELEMENTAL FE) 325 MG: 325 (65 FE) TAB at 09:06

## 2021-01-01 RX ADMIN — MORPHINE SULFATE 4 MG: 4 INJECTION, SOLUTION INTRAMUSCULAR; INTRAVENOUS at 18:02

## 2021-01-01 RX ADMIN — MORPHINE SULFATE 4 MG: 4 INJECTION, SOLUTION INTRAMUSCULAR; INTRAVENOUS at 19:56

## 2021-01-01 RX ADMIN — FLUTICASONE PROPIONATE 2 SPRAY: 50 SPRAY, METERED NASAL at 09:12

## 2021-01-01 RX ADMIN — ALTEPLASE 1 MG/HR: 2.2 INJECTION, POWDER, LYOPHILIZED, FOR SOLUTION INTRAVENOUS at 02:43

## 2021-01-01 RX ADMIN — PREGABALIN 300 MG: 100 CAPSULE ORAL at 09:05

## 2021-01-01 RX ADMIN — MORPHINE SULFATE 4 MG: 4 INJECTION, SOLUTION INTRAMUSCULAR; INTRAVENOUS at 11:11

## 2021-01-01 RX ADMIN — CEFAZOLIN 3 G: 10 INJECTION, POWDER, FOR SOLUTION INTRAVENOUS at 18:04

## 2021-01-01 RX ADMIN — LISINOPRIL 5 MG: 10 TABLET ORAL at 09:08

## 2021-01-01 RX ADMIN — MORPHINE SULFATE 4 MG: 4 INJECTION, SOLUTION INTRAMUSCULAR; INTRAVENOUS at 14:03

## 2021-01-01 RX ADMIN — PANTOPRAZOLE SODIUM 40 MG: 40 TABLET, DELAYED RELEASE ORAL at 06:26

## 2021-01-01 RX ADMIN — CYCLOBENZAPRINE 10 MG: 10 TABLET, FILM COATED ORAL at 09:06

## 2021-01-01 RX ADMIN — Medication 10 ML: at 19:54

## 2021-01-01 RX ADMIN — SODIUM CHLORIDE: 9 INJECTION, SOLUTION INTRAVENOUS at 14:00

## 2021-01-01 RX ADMIN — INSULIN GLARGINE 25 UNITS: 100 INJECTION, SOLUTION SUBCUTANEOUS at 19:45

## 2021-01-01 RX ADMIN — FERROUS SULFATE TAB 325 MG (65 MG ELEMENTAL FE) 325 MG: 325 (65 FE) TAB at 19:42

## 2021-01-01 RX ADMIN — ALTEPLASE 1 MG/HR: 2.2 INJECTION, POWDER, LYOPHILIZED, FOR SOLUTION INTRAVENOUS at 14:00

## 2021-01-01 RX ADMIN — CYCLOBENZAPRINE 10 MG: 10 TABLET, FILM COATED ORAL at 14:01

## 2021-01-01 RX ADMIN — MORPHINE SULFATE 2 MG: 2 INJECTION, SOLUTION INTRAMUSCULAR; INTRAVENOUS at 05:18

## 2021-01-01 RX ADMIN — LOPERAMIDE HYDROCHLORIDE 2 MG: 2 CAPSULE ORAL at 19:43

## 2021-01-01 RX ADMIN — SODIUM CHLORIDE, PRESERVATIVE FREE 10 ML: 5 INJECTION INTRAVENOUS at 18:04

## 2021-01-01 RX ADMIN — HYDROXYUREA 500 MG: 500 CAPSULE ORAL at 19:43

## 2021-01-01 RX ADMIN — DULOXETINE HYDROCHLORIDE 30 MG: 30 CAPSULE, DELAYED RELEASE ORAL at 16:08

## 2021-01-01 RX ADMIN — HYDROXYUREA 500 MG: 500 CAPSULE ORAL at 09:06

## 2021-01-01 RX ADMIN — HEPARIN SODIUM 500 UNITS/HR: 10000 INJECTION, SOLUTION INTRAVENOUS at 19:54

## 2021-01-01 RX ADMIN — OXYCODONE AND ACETAMINOPHEN 1 TABLET: 5; 325 TABLET ORAL at 21:10

## 2021-01-01 RX ADMIN — MORPHINE SULFATE 4 MG: 4 INJECTION, SOLUTION INTRAMUSCULAR; INTRAVENOUS at 08:17

## 2021-01-01 RX ADMIN — CEFAZOLIN 3 G: 10 INJECTION, POWDER, FOR SOLUTION INTRAVENOUS at 12:20

## 2021-01-01 RX ADMIN — CEFAZOLIN 3 G: 10 INJECTION, POWDER, FOR SOLUTION INTRAVENOUS at 06:26

## 2021-01-01 RX ADMIN — FENOFIBRATE 54 MG: 54 TABLET ORAL at 09:05

## 2021-01-01 RX ADMIN — SODIUM CHLORIDE, PRESERVATIVE FREE 10 ML: 5 INJECTION INTRAVENOUS at 16:02

## 2021-01-01 RX ADMIN — MORPHINE SULFATE 4 MG: 4 INJECTION, SOLUTION INTRAMUSCULAR; INTRAVENOUS at 16:01

## 2021-01-01 RX ADMIN — POTASSIUM CHLORIDE 20 MEQ: 1500 TABLET, EXTENDED RELEASE ORAL at 16:08

## 2021-01-01 RX ADMIN — SODIUM CHLORIDE: 9 INJECTION, SOLUTION INTRAVENOUS at 13:22

## 2021-01-01 RX ADMIN — Medication 10 ML: at 09:16

## 2021-01-01 RX ADMIN — MORPHINE SULFATE 4 MG: 4 INJECTION, SOLUTION INTRAMUSCULAR; INTRAVENOUS at 22:09

## 2021-01-01 RX ADMIN — INSULIN LISPRO 2 UNITS: 100 INJECTION, SOLUTION INTRAVENOUS; SUBCUTANEOUS at 19:44

## 2021-01-01 RX ADMIN — POTASSIUM CHLORIDE 20 MEQ: 1500 TABLET, EXTENDED RELEASE ORAL at 09:15

## 2021-01-01 RX ADMIN — PREGABALIN 300 MG: 100 CAPSULE ORAL at 19:43

## 2021-01-01 RX ADMIN — CHOLESTYRAMINE 4 G: 4 POWDER, FOR SUSPENSION ORAL at 19:43

## 2021-01-01 ASSESSMENT — PAIN DESCRIPTION - DESCRIPTORS
DESCRIPTORS: ACHING;BURNING

## 2021-01-01 ASSESSMENT — PAIN DESCRIPTION - ORIENTATION
ORIENTATION: LEFT
ORIENTATION: LEFT

## 2021-01-01 ASSESSMENT — PAIN DESCRIPTION - PAIN TYPE
TYPE: ACUTE PAIN
TYPE: ACUTE PAIN

## 2021-01-01 ASSESSMENT — PAIN SCALES - GENERAL
PAINLEVEL_OUTOF10: 6
PAINLEVEL_OUTOF10: 8
PAINLEVEL_OUTOF10: 6

## 2021-01-01 ASSESSMENT — PAIN DESCRIPTION - LOCATION
LOCATION: LEG
LOCATION: LEG

## 2021-01-01 NOTE — ANESTHESIA PRE PROCEDURE
Department of Anesthesiology  Preprocedure Note       Name:  Brandy Pickett   Age:  50 y.o.  :  1975                                          MRN:  [id number]         Date:  2021      Surgeon: Brooke Mccoy):  Tanya Lezama MD    Procedure: Procedure(s):  LEFT LOWER EXTREMITY, VENOGRAM, FOLLOW UP POSSIBLE REMOVAL LYSIS CATHETER    Medications prior to admission:   Prior to Admission medications    Medication Sig Start Date End Date Taking? Authorizing Provider   furosemide (LASIX) 20 MG tablet Take 1 tablet by mouth daily for 7 days 20  Bryan Andre MD   fluticasone Arlis Seed) 50 MCG/ACT nasal spray instill 2 sprays into each nostril once daily 20   Garland Lassiter, APRN - CNP   Elastic Bandages & Supports (JOBST KNEE HIGH COMPRESSION SM) MISC Knee high compression stockings, 20-30 mm/Hg 11/10/20   Denzel Hardin MD   insulin lispro (HUMALOG) 100 UNIT/ML injection vial Inject 0-3 Units into the skin nightly **Corrective Bedtime (50%) Low Dose Algorithm**   Glucose: Dose:                No Insulin   140-249 1 Unit   250-349 2 Units   Over 350 3 Units 10/15/20   Army Jo MD   potassium chloride (KLOR-CON M) 20 MEQ extended release tablet Take 1 tablet by mouth 2 times daily (with meals) 10/15/20   Army Jo MD   apixaban (ELIQUIS) 5 MG TABS tablet Take 1 tablet by mouth 2 times daily 10/15/20   Army Jo MD   lisinopril (PRINIVIL;ZESTRIL) 5 MG tablet Take 1 tablet by mouth daily 10/16/20   Army Jo MD   metoprolol succinate (TOPROL XL) 25 MG extended release tablet Take 1 tablet by mouth daily 10/16/20   Army Jo MD   pregabalin (LYRICA) 150 MG capsule Take 300 mg by mouth 2 times daily.     Historical Provider, MD   cholestyramine (QUESTRAN) 4 g packet Take 1 packet by mouth 2 times daily 20   Elise Serna MD   glucose (GLUTOSE) 40 % GEL Take 37.5 mLs by mouth as needed (low sugar) 20   Elise Serna MD   insulin glargine (LANTUS) 100 UNIT/ML injection vial Inject 25 Units into the skin Daily 9/27/20   Dimitrios Dias MD   melatonin 3 MG TABS tablet Take 3 mg by mouth nightly as needed (sleep)    Historical Provider, MD   cyclobenzaprine (FLEXERIL) 10 MG tablet Take 10 mg by mouth three times daily    Historical Provider, MD   fenofibrate (TRICOR) 54 MG tablet Take 54 mg by mouth daily    Historical Provider, MD   ferrous sulfate (IRON 325) 325 (65 Fe) MG tablet Take 325 mg by mouth 2 times daily    Historical Provider, MD   hydroxyurea (HYDREA) 500 MG chemo capsule Take 500 mg by mouth 2 times daily    Historical Provider, MD   loperamide (IMODIUM) 2 MG capsule Take 2 mg by mouth 4 times daily as needed for Diarrhea    Historical Provider, MD   insulin lispro (HUMALOG) 100 UNIT/ML injection vial Inject 3 Units into the skin 3 times daily (before meals) Injects 3 units three times daily before meals in addition to following sliding scale  : 0 units  141-180: 1 unit  181-220: 2 units  221-260: 3 units  261-300: 4 units  301-340: 5 units  >341: 6 units and call MD    Historical Provider, MD   pantoprazole (PROTONIX) 40 MG tablet Take 40 mg by mouth daily    Historical Provider, MD   DULoxetine (CYMBALTA) 30 MG extended release capsule Take 1 capsule by mouth daily 5/14/20   IGOR Hathaway CNP   pravastatin (PRAVACHOL) 20 MG tablet Take 1 tablet by mouth nightly 5/14/20   IGOR Hathaway CNP       Current medications:    No current facility-administered medications for this visit.       Current Outpatient Medications   Medication Sig Dispense Refill    furosemide (LASIX) 20 MG tablet Take 1 tablet by mouth daily for 7 days 7 tablet 0     Facility-Administered Medications Ordered in Other Visits   Medication Dose Route Frequency Provider Last Rate Last Admin    morphine sulfate (PF) injection 4 mg  4 mg Intravenous Q2H PRN Wing Radha MD        oxyCODONE-acetaminophen (PERCOCET) 5-325 MG per tablet 1 tablet  1 tablet Oral Q4H PRN Bobo Anthony Gomez MD        insulin lispro (HUMALOG) injection vial 0-10 Units  0-10 Units Subcutaneous 4x Daily AC & HS Audrey Carrillo MD        glucose (GLUTOSE) 40 % oral gel 15 g  15 g Oral PRN Audrey Carrillo MD        dextrose 50 % IV solution  12.5 g Intravenous PRN Audrey Carrillo MD        glucagon (rDNA) injection 1 mg  1 mg Intramuscular PRN Audrey Carrillo MD        dextrose 5 % solution  100 mL/hr Intravenous PRN Audrey Carrillo MD        alteplase (CATHFLO) 10 mg in sodium chloride 0.9 % 100 mL infusion  1 mg/hr Intracatheter Continuous Audrey Carrillo MD 10 mL/hr at 01/01/21 0243 1 mg/hr at 01/01/21 0243    heparin 25,000 units in dextrose 5% 250 mL infusion  500 Units/hr Intracatheter Continuous Audrey Carrillo MD 5 mL/hr at 12/31/20 0940 500 Units/hr at 12/31/20 0940    0.9 % sodium chloride infusion   Intracatheter Continuous Audrey Carrillo MD 35 mL/hr at 12/31/20 0940 35 mL/hr at 12/31/20 0940    cholestyramine (QUESTRAN) packet 4 g  1 packet Oral BID Audrey Carrillo MD   4 g at 12/31/20 1956    cyclobenzaprine (FLEXERIL) tablet 10 mg  10 mg Oral TID Audrey Carrillo MD   10 mg at 12/31/20 1758    DULoxetine (CYMBALTA) extended release capsule 30 mg  30 mg Oral Daily Audrey Carrillo MD   30 mg at 12/31/20 1759    fenofibrate (TRICOR) tablet 54 mg  54 mg Oral Daily Audrey Carrillo MD   54 mg at 12/31/20 1759    ferrous sulfate (IRON 325) tablet 325 mg  325 mg Oral BID Audrey Carrillo MD   325 mg at 12/31/20 1956    fluticasone (FLONASE) 50 MCG/ACT nasal spray 2 spray  2 spray Each Nostril Daily Audrey Carrillo MD   2 spray at 12/31/20 1800    hydroxyurea (HYDREA) chemo capsule 500 mg  500 mg Oral BID Audrey Carrillo MD   500 mg at 12/31/20 1957    insulin glargine (LANTUS) injection vial 25 Units  25 Units Subcutaneous Daily Audrey Carrillo MD   25 Units at 12/31/20 1958    insulin lispro (HUMALOG) injection vial 3 Units  3 Units Subcutaneous TID  Bobo Che MD        insulin lispro (HUMALOG) injection vial 0-3 Units  0-3 Units Subcutaneous Nightly Forest Zapata MD        lisinopril (PRINIVIL;ZESTRIL) tablet 5 mg  5 mg Oral Daily Forest Zapata MD   5 mg at 12/31/20 1759    loperamide (IMODIUM) capsule 2 mg  2 mg Oral 4x Daily PRN Forest Zapata MD   2 mg at 12/31/20 2337    melatonin tablet 3 mg  3 mg Oral Nightly PRN Forest Zapata MD        metoprolol succinate (TOPROL XL) extended release tablet 25 mg  25 mg Oral Daily Forest Zapata MD   25 mg at 12/31/20 1759    pantoprazole (PROTONIX) tablet 40 mg  40 mg Oral Daily Forest Zapata MD   40 mg at 01/01/21 0626    potassium chloride (KLOR-CON M) extended release tablet 20 mEq  20 mEq Oral BID WC Forest Zapata MD   20 mEq at 12/31/20 1759    pravastatin (PRAVACHOL) tablet 20 mg  20 mg Oral Nightly Forest Zapata MD   20 mg at 12/31/20 1956    pregabalin (LYRICA) capsule 300 mg  300 mg Oral BID Forest Zapata MD   300 mg at 12/31/20 1957    sodium chloride flush 0.9 % injection 10 mL  10 mL Intravenous 2 times per day Forest Zapata MD   10 mL at 12/31/20 1956    sodium chloride flush 0.9 % injection 10 mL  10 mL Intravenous PRN Forest Zapata MD        acetaminophen (TYLENOL) tablet 650 mg  650 mg Oral Q4H PRN Forest Zapata MD        ondansetron [facility]) injection 4 mg  4 mg Intravenous Q6H PRN Forest Zapata MD        0.9 % sodium chloride infusion   Intravenous Continuous PRN Forest Zapata MD        ceFAZolin (ANCEF) 3 g in dextrose 5 % 100 mL IVPB  3 g Intravenous Nalini Yang MD   Stopped at 01/01/21 1397       Allergies:     Allergies   Allergen Reactions    Seasonal      HAYFEVER / sneezing,watery eyes    Tramadol Itching       Problem ARTHROPLASTY Right 10/31/2016    Total right hip  Dave Gill MD    UPPER GASTROINTESTINAL ENDOSCOPY N/A 9/4/2020    EGD DIAGNOSTIC ONLY performed by Satinder Sinha MD at [facility] 1  2007    LEFT WRIST       Social History:    Social History     Tobacco Use    Smoking status: Never Smoker    Smokeless tobacco: Current User     Types: Chew   Substance Use Topics    Alcohol use: Not Currently     Alcohol/week: 0.0 standard drinks     Frequency: Monthly or less                                Ready to quit: Not Answered  Counseling given: Not Answered      Vital Signs (Current): There were no vitals filed for this visit. BP Readings from Last 3 Encounters:   01/01/21 116/82   11/20/20 (!) 103/59   11/16/20 100/64       NPO Status:  more then 8 hrs                                                                               BMI:   Wt Readings from Last 3 Encounters:   01/01/21 290 lb 2 oz (131.6 kg)   11/18/20 237 lb (107.5 kg)   11/16/20 240 lb (108.9 kg)     There is no height or weight on file to calculate BMI.    CBC:   Lab Results   Component Value Date    WBC 7.9 01/01/2021    RBC 3.87 01/01/2021    HGB 13.3 01/01/2021    HCT 39.7 01/01/2021    .6 01/01/2021    RDW 18.6 01/01/2021     01/01/2021       CMP:   Lab Results   Component Value Date     01/01/2021    K 4.6 01/01/2021    K 4.3 12/30/2020     01/01/2021    CO2 21 01/01/2021    BUN 10 01/01/2021    CREATININE 0.8 01/01/2021    GFRAA >60 01/01/2021    LABGLOM >60 01/01/2021    GLUCOSE 94 01/01/2021    GLUCOSE 125 05/11/2012    PROT 7.0 12/30/2020    CALCIUM 9.7 01/01/2021    BILITOT <0.2 12/30/2020    ALKPHOS 126 12/30/2020    AST 20 12/30/2020    ALT 30 12/30/2020       POC Tests: No results for input(s): POCGLU, POCNA, POCK, POCCL, POCBUN, POCHEMO, POCHCT in the last 72 hours.     Coags:   Lab Results   Component Value Date    PROTIME 12.8 11/18/2020    INR 1.1 11/18/2020    APTT 36.5 01/01/2021       HCG (If Applicable): No results found for: PREGTESTUR, PREGSERUM, HCG, HCGQUANT     ABGs: No results found for: PHART, PO2ART, NSY3REP, CMM3ZUC, BEART, R3LCMHMO     Type & Screen (If Applicable):  No results found for: LABABO, LABRH    Drug/Infectious Status (If Applicable):  No results found for: HIV, HEPCAB    COVID-19 Screening (If Applicable):   Lab Results   Component Value Date    COVID19 Not Detected 12/31/2020    COVID19 Not Detected 09/21/2020         Anesthesia Evaluation  Patient summary reviewed and Nursing notes reviewed no history of anesthetic complications:   Airway: Mallampati: III        Dental:      Comment: Several Missing. None loose. Pulmonary:Negative Pulmonary ROS breath sounds clear to auscultation                             Cardiovascular:    (+) hypertension:, past MI: > 6 months, CAD:,         Rhythm: regular  Rate: normal                 ROS comment: EF 60-65%     Neuro/Psych:   (+) neuromuscular disease:, psychiatric history:depression/anxiety             GI/Hepatic/Renal:            ROS comment: Obese. Endo/Other:    (+) DiabetesType II DM, , .                  ROS comment:  Abdominal:           Vascular:   + PE (5/20). ROS comment: IVC filter  LLE DVT  IVC thrombus. Anesthesia Plan      MAC     ASA 4             Anesthetic plan and risks discussed with patient. Plan discussed with CRNA. Pt assessed and interviewed prior to anesthesia (late entry)  Neida Castrejon DO   1/1/2021        Patient seen and examined, chart reviewed, agree with above findings. Anesthetic plan, risks, benefits, alternatives, and personnel involved discussed with patient. Patient verbalized an understanding and agreed to proceed. NPO status confirmed. Anesthetic plan discussed with care team members and agreed upon.     Neida Castrejon DO   1/1/2021  8:16 AM

## 2021-01-01 NOTE — PROGRESS NOTES
Hospitalist Progress Note      SYNOPSIS: Patient admitted on 2020 for DVT, lower extremity, recurrent, bilateral [facility])    Hospital Course: 50 y.o. 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 He states he has had bilateral leg pain and swelling radiating from his groin to his ankles for the past two days that has not improved and described as burning pain. He is on anticoagulation with an IVC in place for known DVTs and PEs. He was sent from the facility for worsening symptoms. He was hospitalized in 2020 with extensive left leg DVT requiring EKOS and thrombolytics and was COVID positive at that time. SUBJECTIVE:    Patient seen and examined  Records reviewed. EKOS catheter in place. Patient complains of continued bilateral lower extremity pain which is controlled well with current pain medication regimen. Reports he is sleepy. Also frustrated with his recurrent medical issues. Stable overnight. No other overnight issues reported. Temp (24hrs), Av.4 °F (36.9 °C), Min:97.7 °F (36.5 °C), Max:98.8 °F (37.1 °C)    DIET: Diet NPO Effective Now Exceptions are: Ice Chips, Sips with Meds  CODE: Full Code    Intake/Output Summary (Last 24 hours) at 2021 1320  Last data filed at 2021 1220  Gross per 24 hour   Intake 1831 ml   Output 2000 ml   Net -169 ml       Review of Systems  All bolded are positive; please see HPI  General:  Fever, chills, diaphoresis, fatigue, malaise, night sweats, weight loss  Psychological:  Anxiety, disorientation, hallucinations. ENT:  Epistaxis, headaches, vertigo, visual changes.   Cardiovascular:  Chest pain, irregular heartbeats, palpitations, paroxysmal nocturnal dyspnea. Respiratory:  Shortness of breath, coughing, sputum production, hemoptysis, wheezing, orthopnea. Gastrointestinal:  Nausea, vomiting, diarrhea, heartburn, constipation, abdominal pain, hematemesis, hematochezia, melena, acholic stools  Genito-Urinary:  Dysuria, urgency, frequency, hematuria  Musculoskeletal:  Joint pain, joint stiffness, joint swelling, muscle pain, leg pain and swelling  Neurology:  Headache, focal neurological deficits, weakness, numbness, paresthesia  Derm:  Rashes, ulcers, excoriations, bruising  Extremities:  Decreased ROM, peripheral edema, mottling      OBJECTIVE:    BP 85/62   Pulse 91   Temp 97.8 °F (36.6 °C) (Oral)   Resp 11   Wt 290 lb 2 oz (131.6 kg)   SpO2 92%   BMI 38.28 kg/m²     General appearance:  Sleeping but easily arousable, alert, and oriented to person, place, time, and purpose; appears stated age and cooperative; no apparent distress no labored breathing  HEENT:  Conjunctivae/corneas clear. Neck: Supple. No jugular venous distention. Respiratory: symmetrical; clear to auscultation bilaterally; no wheezes; no rhonchi; no rales  Cardiovascular: rhythm regular; rate controlled; no murmurs  Abdomen: Soft, nontender, nondistended  Extremities:  peripheral pulses present; + peripheral edema; no ulcers, LLE catheter with sheat  Musculoskeletal: No clubbing, cyanosis, no bilateral lower extremity edema. Brisk capillary refill.    Skin:  No rashes on visible skin  Neurologic: oriented, following commands     ASSESSMENT and PLAN:    Recurrent bilateral lower extremity DVT with failure of Eliquis  Thrombocytosis  Type 2 DM  Obesity  Essential hypertension    Pain control  Daily weights  I/Os  Neurovascular checks, vascular following  Lantus daily, sliding scale, monitor blood glucose  Remainder per critical care team  lisinopril, metoprolol, monitor blood pressure  Statin at night  Oncology following, on hydroxyurea      DISPOSITION: facility  Diet: NPO  PT/OT: when ok with vascular  Code Status: Full code  GI Prophylaxis: Protonix  DVT Prophylaxis: heparin infusion    Medications:  REVIEWED DAILY    Infusion Medications    dextrose      alteplase (ACTIVASE) 10 mg in 0.9% sodium chloride infusion 100 mL 1 mg/hr (01/01/21 0800)    heparin (PORCINE) Infusion 500 Units/hr (01/01/21 0507)    sodium chloride 35 mL/hr at 01/01/21 0800     Scheduled Medications    insulin lispro  0-10 Units Subcutaneous 4x Daily AC & HS    cholestyramine  1 packet Oral BID    cyclobenzaprine  10 mg Oral TID    DULoxetine  30 mg Oral Daily    fenofibrate  54 mg Oral Daily    ferrous sulfate  325 mg Oral BID    fluticasone  2 spray Each Nostril Daily    hydroxyurea  500 mg Oral BID    insulin glargine  25 Units Subcutaneous Daily    insulin lispro  3 Units Subcutaneous TID AC    insulin lispro  0-3 Units Subcutaneous Nightly    lisinopril  5 mg Oral Daily    metoprolol succinate  25 mg Oral Daily    pantoprazole  40 mg Oral Daily    potassium chloride  20 mEq Oral BID WC    pravastatin  20 mg Oral Nightly    pregabalin  300 mg Oral BID    sodium chloride flush  10 mL Intravenous 2 times per day    ceFAZolin (ANCEF) IVPB  3 g Intravenous Q8H     PRN Meds: morphine, oxyCODONE-acetaminophen, fentanNYL, fentanNYL, fentanNYL, fentanNYL, ondansetron, glucose, dextrose, glucagon (rDNA), dextrose, loperamide, melatonin, sodium chloride flush, acetaminophen, ondansetron    Labs:     Recent Labs     12/31/20  2230 01/01/21  0530 01/01/21  1108   WBC 7.1 7.9 6.7   HGB 12.5 13.3 12.2*   HCT 39.1 39.7 37.5   * 454* 383       Recent Labs     12/30/20  2246 01/01/21  0530    138   K 4.3 4.6    100   CO2 27 21*   BUN 10 10   CREATININE 0.8 0.8   CALCIUM 9.4 9.7       Recent Labs     12/30/20 2246   PROT 7.0   ALKPHOS 126   ALT 30   AST 20   BILITOT <0.2       No results for input(s): INR in the last 72 hours.     Recent Labs     12/30/20 2246   TROPONINI <0.01 Chronic labs:    Lab Results   Component Value Date    CHOL 111 11/19/2020    TRIG 148 11/19/2020    HDL 34 11/19/2020    LDLCALC 47 11/19/2020    TSH 0.833 09/18/2020    INR 1.1 11/18/2020    LABA1C 5.1 11/19/2020       Radiology: REVIEWED DAILY    +++++++++++++++++++++++++++++++++++++++++++++++++  [address], [place]  +++++++++++++++++++++++++++++++++++++++++++++++++

## 2021-01-01 NOTE — OP NOTE
Cardiovascular Lab Procedure Report    Heather Angulo  1975    Date : 1/1/2021  Surgeon: Wing Radha M.D. Pre-procedure Diagnosis: Left lower extremity DVT, IVC thrombosis  Post-procedure Diagnosis: Same  Procedure:    84629 FU  Venogram of IVC and Left lower extremity with existing lysis catheter    Anesthesia: Local with IV sedation  Assistants: Cath Lab Staff  Estimated Blood Loss: Minimal  Complications: none  Findings:    Left    Inferior Vena Cava Thrombosed at level of filter and below   Common Iliac Vein thrombosed   External Iliac Vein thrombosed   Common Femoral Vein Some flow but large amount of residual thrombus   Superficial Femoral Vein patent     Procedure Details :  Timeout preformed identifying pt and procedure. Left popliteal region prepped and draped in sterile fashion. Patient given sedation as needed throughout the case. Preexisting 6 fr sheath with EKOS lsysis catheter was already in place in the Left popliteal vein. Advantage glide wire placed, and lysis catheter advanced to be placed at level of filter. Venogram preformed of  Left lower extremity and inferior vena cava. Catheter was left in place as extensive thrombus still noted.     Plan  Will take back to angio tomorrow  Serial labs  Neuro exams    Wing Radha

## 2021-01-01 NOTE — PLAN OF CARE
Problem: Skin Integrity:  Goal: Will show no infection signs and symptoms  Description: Will show no infection signs and symptoms  Outcome: Met This Shift  Goal: Absence of new skin breakdown  Description: Absence of new skin breakdown  Outcome: Met This Shift     Problem: Falls - Risk of:  Goal: Will remain free from falls  Description: Will remain free from falls  Outcome: Met This Shift  Goal: Absence of physical injury  Description: Absence of physical injury  Outcome: Met This Shift     Problem: Pain:  Goal: Pain level will decrease  Description: Pain level will decrease  Outcome: Met This Shift  Goal: Control of acute pain  Description: Control of acute pain  Outcome: Met This Shift  Goal: Control of chronic pain  Description: Control of chronic pain  Outcome: Met This Shift     Problem: Bleeding:  Goal: Will show no signs and symptoms of excessive bleeding  Description: Will show no signs and symptoms of excessive bleeding  Outcome: Met This Shift

## 2021-01-01 NOTE — ANESTHESIA POSTPROCEDURE EVALUATION
Department of Anesthesiology  Postprocedure Note    Patient: Josefa Palmer  MRN: [id number]  [place]: 1975  Date of evaluation: 1/1/2021  Time:  5:31 PM     Procedure Summary     Date: 01/01/21 Room / Location: [facility] OR 03 / [facility]    Anesthesia Start: 9581 Anesthesia Stop: 8966    Procedure: LEFT LOWER EXTREMITY, VENOGRAM, FOLLOW UP POSSIBLE REMOVAL LYSIS CATHETER (Left Leg Lower) Diagnosis: (.)    Surgeons: Whit Max MD Responsible Provider: Natalya Landrum DO    Anesthesia Type: MAC ASA Status: 4          Anesthesia Type: MAC    Cristal Phase I:      Cristal Phase II:      Last vitals: Reviewed and per EMR flowsheets.        Anesthesia Post Evaluation    Patient location during evaluation: PACU  Patient participation: complete - patient participated  Level of consciousness: awake and alert  Pain score: 1  Airway patency: patent  Nausea & Vomiting: no nausea and no vomiting  Complications: no  Cardiovascular status: hemodynamically stable  Respiratory status: acceptable  Hydration status: euvolemic

## 2021-01-01 NOTE — CONSULTS
[facility]  Hematology/Oncology  Consult      Patient Name: Teddy Plummer II  YOB: 1975  PCP: IGOR Coto CNP   Referring Provider:      Reason for Consultation:   Chief Complaint   Patient presents with    Leg Pain     pt with increased swelling and pain and redness in BLE x1 day; hx: DVTs and PEs, IVC filter placed x 1-2 months ago        History of Present Illness: This is a 49-year-old male patient of Dr. Kassandra Cortes who is being seen for thrombocytosis on Hydrea 500 mg twice daily as well as an aspirin 81 mg, moderate normocytic anemia, and chronic BL LE DVT on Eliquis and IVC filter. 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 He was then hospitalized in October for an extensive left leg DVT that required EKOS and thrombolytics. He was also COVID-19 + at that time. He was again hospitalized in November for lower back pain were he was found to have a DVT in his RLE as well. Patient presented to emergency room for current admission for bilateral leg swelling. CT A/P showed  Infrarenal IVC filter in place. Interval placement of left common iliac and external iliac vein stent. There is low-attenuation within the IVC below the stent as well as within the left common iliac vein stent relative to the IVC above the stent. Findings are likely related to thrombus. Fluid-filled loops of small bowel measure up to 2.6 cm in diameter suggesting partial small bowel obstruction or ileus. Angio performed 12/31 s/p catheter directed lysis with tPA/heparin infusing via EKOS catheter. CBC was stable on admission platelets well controlled at 563.   Consultation evaluation and recommendations acute DVT of LLE and for failure of eliquis.         Diagnostic Data:     Past Medical History:   Diagnosis Date    Accident 11/2019    stepped on nail rt ft about 1 month ago- healed per patient    Acute thrombosis of inferior vena cava ([address] 10/10/2020    SIMÓN (acute kidney injury) ([address] 2008 apx    kidney bruised due to fall / Maryann Hose    Allergic rhinitis     Chronic back pain     Depression     Diabetes mellitus ([address]     Difficulty sleeping     at times    Displacement of lumbar intervertebral disc without myelopathy     Fibromyalgia     Fractured rib     2008 / healed    H/O seasonal allergies     Head injury 1980'S apx    no residual s/s    Hyperlipidemia     Hypertension     Obesity (BMI 35.0-39.9 without comorbidity)     bmi 39.2  weight 296 #    Osteoarthritis     Thoracic or lumbosacral neuritis or radiculitis, unspecified        Patient Active Problem List    Diagnosis Date Noted    DVT, lower extremity, recurrent, bilateral ([address] 12/31/2020    Deep vein thrombosis (DVT) of both lower extremities ([address] 11/19/2020    Ground glass opacity present on imaging of lung 11/19/2020    Tobacco dependence 11/19/2020    Anticoagulated 11/19/2020    Back pain 11/19/2020    Cellulitis 11/18/2020    Acute thrombosis of inferior vena cava ([address] 10/10/2020    COVID-19 virus infection 10/09/2020    Acute deep vein thrombosis (DVT) ([address] 10/07/2020    SIMÓN (acute kidney injury) ([address] 09/17/2020    Septicemia ([address] 09/17/2020    Intra-abdominal infection 09/17/2020    Acute GI bleeding 09/03/2020    Acute blood loss anemia     Skin wound from surgical incision 08/13/2020    Thrombocytosis ([address] 08/10/2020    Decubitus ulcer of sacral area 08/01/2020    Abnormal findings on diagnostic imaging of gall bladder 08/01/2020    Splenic infarction 08/01/2020    Cyst of spleen 08/01/2020    Splenic abscess 08/01/2020    Anemia 08/01/2020    Other pulmonary embolism without acute cor pulmonale ([address] 08/01/2020    Enterocolitis 07/31/2020    Rectus diastasis 11/01/2019    Recurrent unilateral inguinal hernia 11/01/2019    Cellulitis of sacral region 07/02/2019    Cellulitis of foot 06/30/2019    Neuropathy 06/30/2019    Cellulitis, wound, post-operative 06/30/2019    Left leg swelling 06/30/2019    SIRS (systemic inflammatory response syndrome) ([facility] 06/30/2019    Other hyperlipidemia 10/13/2017    Primary osteoarthritis of right hip 11/03/2016    Primary osteoarthritis of left hip 04/11/2016    Type 2 diabetes mellitus ([facility] 04/08/2016    Lumbar disc herniation 02/22/2016    Lumbar radiculopathy 12/17/2015    Osteoarthritis of spine with radiculopathy, lumbar region 12/17/2015    Class 1 obesity in adult 12/17/2015    Allergic rhinitis 11/23/2015    Essential hypertension 10/16/2015    Vitamin D deficiency 04/14/2015    Fibromyalgia 12/15/2014    Insomnia 07/04/2014    Osteoarthritis 03/27/2014    Lumbar spondylosis 01/07/2014    Neuropathic pain 05/08/2012        Past Surgical History:   Procedure Laterality Date    COLONOSCOPY N/A 9/5/2020    COLONOSCOPY WITH BIOPSY performed by Susie Griffin MD at [address] [facility] CT PTC NEW ACCESS  8/3/2020    CT PTC NEW ACCESS 8/3/2020 [facility] CT    FOOT SURGERY Right 1985    to treat shattered bones    HERNIA REPAIR  2001    DOUBLE HERNIA    HERNIA REPAIR Right 12/9/2019    LAPAROSCOPIC RIGHT INGUINAL HERNIA REPAIR, MESH 10x15 cm PLACEMENT performed by Bo Cabot, MD at [address] [facility] Left 4/11/2016    ILIAC ARTERY STENT INSERTION N/A 10/11/2020    S/P ILIAC STENT PLACEMENT VISUALIZATION performed by [facility] MD Jerrica at [place] [address] N/A 9/18/2020    LAPAROTOMY EXPLORATORY, CHOLECYSTECTOMY, BOWEL RESECTION, right darian colectomy, partial omentectomy, and splenectomy performed by Susie Griffin MD at [address] COLONOSCOPY FLX DX W/MARIA ALEJANDRA Queen 1978 PFRMD N/A 5/7/2018    COLONOSCOPY DIAGNOSTIC performed by Chelsey Turpin MD at [address] [place] [address] Left 2014    Dr. Talat Bryant ARTHROSCOPY Left 11 21 14   [address] [facility], [facility] SURGERY  2001 &2007    RIGHT AND LEFT repair of tears    TOTAL HIP ARTHROPLASTY Right 10/31/2016    Total right hip  Gianna Gupta MD    UPPER GASTROINTESTINAL ENDOSCOPY N/A 9/4/2020    EGD DIAGNOSTIC ONLY performed by Melisa Lee MD at [facility] 1  2007    LEFT WRIST       Family History  Family History   Problem Relation Age of Onset    Hypertension Mother     Arthritis Father     Diabetes Father     Cancer Maternal Grandfather         Skin    Cancer Paternal Uncle         skin    Diabetes Paternal Grandfather     Heart Disease Maternal Grandmother     Stroke Maternal Aunt        Social History    TOBACCO:   reports that he has never smoked. His smokeless tobacco use includes chew. ETOH:   reports previous alcohol use. Home Medications  Prior to Admission medications    Medication Sig Start Date End Date Taking?  Authorizing Provider   furosemide (LASIX) 20 MG tablet Take 1 tablet by mouth daily for 7 days 12/31/20 1/7/21 Yes David Dempsey MD   fluticasone [facility]) 50 MCG/ACT nasal spray instill 2 sprays into each nostril once daily 11/30/20   IGOR Toney - CNP   Elastic Bandages & Supports (JOBST KNEE HIGH COMPRESSION SM) MISC Knee high compression stockings, 20-30 mm/Hg 11/10/20   Herbie Hoover MD   insulin lispro (HUMALOG) 100 UNIT/ML injection vial Inject 0-3 Units into the skin nightly **Corrective Bedtime (50%) Low Dose Algorithm**   Glucose: Dose:                No Insulin   140-249 1 Unit   250-349 2 Units   Over 350 3 Units 10/15/20   Anitra Mena MD   potassium chloride (KLOR-CON M) 20 MEQ extended release tablet Take 1 tablet by mouth 2 times daily (with meals) 10/15/20   Anitra Mena MD   apixaban (ELIQUIS) 5 MG TABS tablet Take 1 tablet by mouth 2 times daily 10/15/20   Anitra Mena MD   lisinopril (PRINIVIL;ZESTRIL) 5 MG tablet Take 1 tablet by mouth daily 10/16/20   Cierra Kenny MD   metoprolol succinate (TOPROL XL) 25 MG extended release tablet Take 1 tablet by mouth daily 10/16/20   Cierra Kenny MD   pregabalin (LYRICA) 150 MG capsule Take 300 mg by mouth 2 times daily.     Historical Provider, MD   cholestyramine (QUESTRAN) 4 g packet Take 1 packet by mouth 2 times daily 9/26/20   Cristian Zabala MD   glucose (GLUTOSE) 40 % GEL Take 37.5 mLs by mouth as needed (low sugar) 9/26/20   Cristian Zabala MD   insulin glargine (LANTUS) 100 UNIT/ML injection vial Inject 25 Units into the skin Daily 9/27/20   Cristian Zabala MD   melatonin 3 MG TABS tablet Take 3 mg by mouth nightly as needed (sleep)    Historical Provider, MD   cyclobenzaprine (FLEXERIL) 10 MG tablet Take 10 mg by mouth three times daily    Historical Provider, MD   fenofibrate (TRICOR) 54 MG tablet Take 54 mg by mouth daily    Historical Provider, MD   ferrous sulfate (IRON 325) 325 (65 Fe) MG tablet Take 325 mg by mouth 2 times daily    Historical Provider, MD   hydroxyurea (HYDREA) 500 MG chemo capsule Take 500 mg by mouth 2 times daily    Historical Provider, MD   loperamide (IMODIUM) 2 MG capsule Take 2 mg by mouth 4 times daily as needed for Diarrhea    Historical Provider, MD   insulin lispro (HUMALOG) 100 UNIT/ML injection vial Inject 3 Units into the skin 3 times daily (before meals) Injects 3 units three times daily before meals in addition to following sliding scale  : 0 units  141-180: 1 unit  181-220: 2 units  221-260: 3 units  261-300: 4 units  301-340: 5 units  >341: 6 units and call MD    Historical Provider, MD   pantoprazole (PROTONIX) 40 MG tablet Take 40 mg by mouth daily    Historical Provider, MD   DULoxetine (CYMBALTA) 30 MG extended release capsule Take 1 capsule by mouth daily 5/14/20   IGOR Rosenthal - CNP   pravastatin (PRAVACHOL) 20 MG tablet Take 1 tablet by mouth nightly 5/14/20   IGOR Rosenthal CNP organomegaly  Skin:  No rash. Neurologic:Cranial nerves grossly intact. No focal motor or sensory deficits . Recent Laboratory Data-   Lab Results   Component Value Date    WBC 7.9 01/01/2021    HGB 13.3 01/01/2021    HCT 39.7 01/01/2021    .6 (H) 01/01/2021     (H) 01/01/2021    LYMPHOPCT 33.0 12/30/2020    RBC 3.87 01/01/2021    MCH 34.4 01/01/2021    MCHC 33.5 01/01/2021    RDW 18.6 (H) 01/01/2021    NEUTOPHILPCT 47.9 12/30/2020    MONOPCT 12.9 (H) 12/30/2020    BASOPCT 1.6 12/30/2020    NEUTROABS 3.58 12/30/2020    LYMPHSABS 2.46 12/30/2020    MONOSABS 0.96 (H) 12/30/2020    EOSABS 0.32 12/30/2020    BASOSABS 0.12 12/30/2020       Lab Results   Component Value Date     01/01/2021    K 4.6 01/01/2021     01/01/2021    CO2 21 (L) 01/01/2021    BUN 10 01/01/2021    CREATININE 0.8 01/01/2021    GLUCOSE 94 01/01/2021    CALCIUM 9.7 01/01/2021    PROT 7.0 12/30/2020    LABALBU 4.4 12/30/2020    BILITOT <0.2 12/30/2020    ALKPHOS 126 12/30/2020    AST 20 12/30/2020    ALT 30 12/30/2020    LABGLOM >60 01/01/2021    GFRAA >60 01/01/2021       Lab Results   Component Value Date    IRON 24 (L) 08/01/2020    TIBC 145 (L) 08/01/2020    FERRITIN 539 10/10/2020           Radiology-    Ct Abdomen Pelvis W Iv Contrast Additional Contrast? None    Result Date: 12/31/2020  EXAMINATION: CT OF THE ABDOMEN AND PELVIS WITH CONTRAST 12/31/2020 1:30 am TECHNIQUE: CT of the abdomen and pelvis was performed with the administration of intravenous contrast. Multiplanar reformatted images are provided for review. Dose modulation, iterative reconstruction, and/or weight based adjustment of the mA/kV was utilized to reduce the radiation dose to as low as reasonably achievable.  COMPARISON: 09/17/2020 HISTORY: ORDERING SYSTEM PROVIDED HISTORY: BLE DVT r/o IVC clot TECHNOLOGIST PROVIDED HISTORY: CT venogram Reason for exam:->BLE DVT r/o IVC clot Additional Contrast?->None What reading provider will be dictating this exam?->CRC FINDINGS: Lower Chest: The lung bases are clear. No pleural or pericardial effusions. Organs: The liver, pancreas, adrenal glands, and kidneys are normal.  The gallbladder is surgically absent. Previous splenectomy. GI/Bowel: The unopacified stomach and small bowel loops are unremarkable. Fluid-filled loops of small bowel measure up to 2.6 cm in diameter. Postsurgical changes in the proximal colon. The colon is intact. Pelvis: Streak artifact from hip arthroplasties partially obscures the pelvis. Bladder is fluid filled and is unremarkable. Peritoneum/Retroperitoneum: Abdominal aorta has normal caliber. No free fluid or free air. No enlarged lymph nodes. Infrarenal IVC filter present. Interval placement of left common iliac and external iliac vein stent. There is some relative low-attenuation within the left common iliac vein stent as well as within the IVC below the level of the filter relative to the IVC above the filter. Bones/Soft Tissues: Sacral decubitus ulcer. Surrounding soft tissue thickening. No destructive osseous lesions. 1. Infrarenal IVC filter in place. Interval placement of left common iliac and external iliac vein stent. There is low-attenuation within the IVC below the stent as well as within the left common iliac vein stent relative to the IVC above the stent. Findings are likely related to thrombus. 2. Fluid-filled loops of small bowel measure up to 2.6 cm in diameter suggesting partial small bowel obstruction or ileus. 3. Status post interval right hemicolectomy and splenectomy.         ASSESSMENT/PLAN :    54 yo male  BL PE s/p IVC  Hx GI bleed  S/p ex lap with cholecystectomy and R hemicolectomy with small bowel resection and side-to-side ileocolonic anastomosis with splenic thrombosis and infarction s/p splenectomy and omentectomy in October 2020 for an extensive left leg DVT that required EKOS and thrombolytics  November hospitalization found RLE DVT     Angio performed 12/31 s/p catheter directed lysis with tPA/heparin infusing via EKOS catheter. To have repeat angio today. CBC was stable on admission platelets well controlled at 563. Consultation evaluation and recommendations acute DVT of LLE and for failure of eliquis.     1/1/2021  Status post follow-up venogram of IVC and left lower extremity with existing lysis catheter  Findings :     Left    Inferior Vena Cava Thrombosed at level of filter and below   Common Iliac Vein thrombosed   External Iliac Vein thrombosed   Common Femoral Vein Some flow but large amount of residual thrombus   Superficial Femoral Vein patent     Apparently advantage Glidewire was placed and lysis catheter was advanced further at the level of the filter with plans for repeat angiogram tomorrow  Continues on intracatheter heparin  Maintain Hydrea 500 mg twice daily  We will follow    IGOR Bernal CNP  Electronically signed 1/1/2021 at 10:09 AM  Note edited to reflect above  Pacheco Campos MD

## 2021-01-01 NOTE — PROGRESS NOTES
Vascular Surgery Progress Note    Pt is being seen in f/u today regarding L LE DVT, IVC [id number]  Subjective  Pt s/e. Swelling and pain is better but still present. Denies sob or chest pain. Sanjana Phlegm not well controlled currently.   Current Medications:    dextrose      alteplase (ACTIVASE) 10 mg in 0.9% sodium chloride infusion 100 mL 1 mg/hr (01/01/21 0243)    heparin (PORCINE) Infusion 500 Units/hr (12/31/20 0940)    sodium chloride 35 mL/hr (12/31/20 0940)    sodium chloride        morphine, oxyCODONE-acetaminophen, glucose, dextrose, glucagon (rDNA), dextrose, loperamide, melatonin, sodium chloride flush, acetaminophen, ondansetron, sodium chloride    insulin lispro  0-10 Units Subcutaneous 4x Daily AC & HS    cholestyramine  1 packet Oral BID    cyclobenzaprine  10 mg Oral TID    DULoxetine  30 mg Oral Daily    fenofibrate  54 mg Oral Daily    ferrous sulfate  325 mg Oral BID    fluticasone  2 spray Each Nostril Daily    hydroxyurea  500 mg Oral BID    insulin glargine  25 Units Subcutaneous Daily    insulin lispro  3 Units Subcutaneous TID AC    insulin lispro  0-3 Units Subcutaneous Nightly    lisinopril  5 mg Oral Daily    metoprolol succinate  25 mg Oral Daily    pantoprazole  40 mg Oral Daily    potassium chloride  20 mEq Oral BID WC    pravastatin  20 mg Oral Nightly    pregabalin  300 mg Oral BID    sodium chloride flush  10 mL Intravenous 2 times per day    ceFAZolin (ANCEF) IVPB  3 g Intravenous Q8H      PHYSICAL EXAM:    BP 84/72   Pulse 89   Temp 98.6 °F (37 °C)   Resp 14   Wt 290 lb 2 oz (131.6 kg)   SpO2 93%   BMI 38.28 kg/m²     Intake/Output Summary (Last 24 hours) at 1/1/2021 0741  Last data filed at 1/1/2021 0700  Gross per 24 hour   Intake 1731 ml   Output 2000 ml   Net -269 ml        Gen awake and alert  CVS S1S2  Resp good resp excursion  Abd soft nt nd  L LE + edema and swelling   Lysis catheter in place with sheath, no hemoatoma appreciated  LABS:    Lab Results   Component Value Date    WBC 7.9 01/01/2021    HGB 13.3 01/01/2021    HCT 39.7 01/01/2021     (H) 01/01/2021    PROTIME 12.8 (H) 11/18/2020    INR 1.1 11/18/2020    APTT 36.5 (H) 01/01/2021    K 4.6 01/01/2021    BUN 10 01/01/2021    CREATININE 0.8 01/01/2021     A/P s/p lysis catheter placement for LLE DVT, IVC thrombosis  · Fu imaging today  · Platelets, hgb, fibrinogen stable  I reviewed the procedure with the patient and family as available. I discussed the procedure, risks, benefits, complications, and alternatives of the procedure. They understand and consent.   All questions were answered    Yadi Calero

## 2021-01-02 ENCOUNTER — ANESTHESIA EVENT (OUTPATIENT)
Dept: OPERATING ROOM | Age: 46
DRG: 180 | End: 2021-01-02
Payer: COMMERCIAL

## 2021-01-02 ENCOUNTER — ANESTHESIA (OUTPATIENT)
Dept: OPERATING ROOM | Age: 46
DRG: 180 | End: 2021-01-02
Payer: COMMERCIAL

## 2021-01-02 VITALS
SYSTOLIC BLOOD PRESSURE: 103 MMHG | DIASTOLIC BLOOD PRESSURE: 73 MMHG | RESPIRATION RATE: 7 BRPM | OXYGEN SATURATION: 98 %

## 2021-01-02 LAB
ANION GAP SERPL CALCULATED.3IONS-SCNC: 8 MMOL/L (ref 7–16)
APTT: 35.8 SEC (ref 24.5–35.1)
APTT: 36 SEC (ref 24.5–35.1)
APTT: 36 SEC (ref 24.5–35.1)
APTT: 38.5 SEC (ref 24.5–35.1)
BUN BLDV-MCNC: 13 MG/DL (ref 6–20)
CALCIUM SERPL-MCNC: 9.1 MG/DL (ref 8.6–10.2)
CHLORIDE BLD-SCNC: 102 MMOL/L (ref 98–107)
CO2: 26 MMOL/L (ref 22–29)
CREAT SERPL-MCNC: 0.9 MG/DL (ref 0.7–1.2)
FIBRINOGEN: 297 MG/DL (ref 225–540)
FIBRINOGEN: 307 MG/DL (ref 225–540)
FIBRINOGEN: 318 MG/DL (ref 225–540)
FIBRINOGEN: 334 MG/DL (ref 225–540)
GFR AFRICAN AMERICAN: >60
GFR NON-AFRICAN AMERICAN: >60 ML/MIN/1.73
GLUCOSE BLD-MCNC: 114 MG/DL (ref 74–99)
HCT VFR BLD CALC: 34.9 % (ref 37–54)
HCT VFR BLD CALC: 35.5 % (ref 37–54)
HCT VFR BLD CALC: 36 % (ref 37–54)
HCT VFR BLD CALC: 37.9 % (ref 37–54)
HEMOGLOBIN: 11.2 G/DL (ref 12.5–16.5)
HEMOGLOBIN: 11.6 G/DL (ref 12.5–16.5)
HEMOGLOBIN: 11.8 G/DL (ref 12.5–16.5)
HEMOGLOBIN: 11.9 G/DL (ref 12.5–16.5)
MCH RBC QN AUTO: 33.3 PG (ref 26–35)
MCH RBC QN AUTO: 33.5 PG (ref 26–35)
MCH RBC QN AUTO: 33.8 PG (ref 26–35)
MCH RBC QN AUTO: 34 PG (ref 26–35)
MCHC RBC AUTO-ENTMCNC: 31.4 % (ref 32–34.5)
MCHC RBC AUTO-ENTMCNC: 32.1 % (ref 32–34.5)
MCHC RBC AUTO-ENTMCNC: 32.7 % (ref 32–34.5)
MCHC RBC AUTO-ENTMCNC: 32.8 % (ref 32–34.5)
MCV RBC AUTO: 103.2 FL (ref 80–99.9)
MCV RBC AUTO: 104.1 FL (ref 80–99.9)
MCV RBC AUTO: 104.5 FL (ref 80–99.9)
MCV RBC AUTO: 106.2 FL (ref 80–99.9)
METER GLUCOSE: 104 MG/DL (ref 74–99)
METER GLUCOSE: 151 MG/DL (ref 74–99)
METER GLUCOSE: 87 MG/DL (ref 74–99)
METER GLUCOSE: 89 MG/DL (ref 74–99)
PDW BLD-RTO: 18.3 FL (ref 11.5–15)
PDW BLD-RTO: 18.4 FL (ref 11.5–15)
PDW BLD-RTO: 18.6 FL (ref 11.5–15)
PDW BLD-RTO: 18.6 FL (ref 11.5–15)
PLATELET # BLD: 318 E9/L (ref 130–450)
PLATELET # BLD: 320 E9/L (ref 130–450)
PLATELET # BLD: 340 E9/L (ref 130–450)
PLATELET # BLD: 348 E9/L (ref 130–450)
PMV BLD AUTO: 8.6 FL (ref 7–12)
PMV BLD AUTO: 8.9 FL (ref 7–12)
PMV BLD AUTO: 9 FL (ref 7–12)
PMV BLD AUTO: 9 FL (ref 7–12)
POTASSIUM SERPL-SCNC: 4.1 MMOL/L (ref 3.5–5)
RBC # BLD: 3.34 E12/L (ref 3.8–5.8)
RBC # BLD: 3.41 E12/L (ref 3.8–5.8)
RBC # BLD: 3.49 E12/L (ref 3.8–5.8)
RBC # BLD: 3.57 E12/L (ref 3.8–5.8)
SODIUM BLD-SCNC: 136 MMOL/L (ref 132–146)
WBC # BLD: 7.7 E9/L (ref 4.5–11.5)
WBC # BLD: 8.7 E9/L (ref 4.5–11.5)
WBC # BLD: 9 E9/L (ref 4.5–11.5)
WBC # BLD: 9.3 E9/L (ref 4.5–11.5)

## 2021-01-02 PROCEDURE — 85027 COMPLETE CBC AUTOMATED: CPT

## 2021-01-02 PROCEDURE — 80048 BASIC METABOLIC PNL TOTAL CA: CPT

## 2021-01-02 PROCEDURE — 85384 FIBRINOGEN ACTIVITY: CPT

## 2021-01-02 PROCEDURE — 2580000003 HC RX 258: Performed by: SURGERY

## 2021-01-02 PROCEDURE — 2500000003 HC RX 250 WO HCPCS: Performed by: NURSE ANESTHETIST, CERTIFIED REGISTERED

## 2021-01-02 PROCEDURE — 2000000000 HC ICU R&B

## 2021-01-02 PROCEDURE — 6360000002 HC RX W HCPCS: Performed by: SURGERY

## 2021-01-02 PROCEDURE — 37213 THROMBLYTIC ART/VEN THERAPY: CPT | Performed by: SURGERY

## 2021-01-02 PROCEDURE — 3700000001 HC ADD 15 MINUTES (ANESTHESIA): Performed by: SURGERY

## 2021-01-02 PROCEDURE — 2709999900 HC NON-CHARGEABLE SUPPLY: Performed by: SURGERY

## 2021-01-02 PROCEDURE — 6360000002 HC RX W HCPCS: Performed by: NURSE ANESTHETIST, CERTIFIED REGISTERED

## 2021-01-02 PROCEDURE — 36415 COLL VENOUS BLD VENIPUNCTURE: CPT

## 2021-01-02 PROCEDURE — 3600000012 HC SURGERY LEVEL 2 ADDTL 15MIN: Performed by: SURGERY

## 2021-01-02 PROCEDURE — 3600000002 HC SURGERY LEVEL 2 BASE: Performed by: SURGERY

## 2021-01-02 PROCEDURE — 82962 GLUCOSE BLOOD TEST: CPT

## 2021-01-02 PROCEDURE — 6360000004 HC RX CONTRAST MEDICATION: Performed by: SURGERY

## 2021-01-02 PROCEDURE — 2580000003 HC RX 258: Performed by: NURSE ANESTHETIST, CERTIFIED REGISTERED

## 2021-01-02 PROCEDURE — 3700000000 HC ANESTHESIA ATTENDED CARE: Performed by: SURGERY

## 2021-01-02 PROCEDURE — 6370000000 HC RX 637 (ALT 250 FOR IP): Performed by: SURGERY

## 2021-01-02 PROCEDURE — 85730 THROMBOPLASTIN TIME PARTIAL: CPT

## 2021-01-02 RX ORDER — MIDAZOLAM HYDROCHLORIDE 1 MG/ML
INJECTION INTRAMUSCULAR; INTRAVENOUS PRN
Status: DISCONTINUED | OUTPATIENT
Start: 2021-01-02 | End: 2021-01-02 | Stop reason: SDUPTHER

## 2021-01-02 RX ORDER — PROPOFOL 10 MG/ML
INJECTION, EMULSION INTRAVENOUS CONTINUOUS PRN
Status: DISCONTINUED | OUTPATIENT
Start: 2021-01-02 | End: 2021-01-02 | Stop reason: SDUPTHER

## 2021-01-02 RX ORDER — GLYCOPYRROLATE 1 MG/5 ML
SYRINGE (ML) INTRAVENOUS PRN
Status: DISCONTINUED | OUTPATIENT
Start: 2021-01-02 | End: 2021-01-02 | Stop reason: SDUPTHER

## 2021-01-02 RX ORDER — KETAMINE HCL IN NACL, ISO-OSM 100MG/10ML
SYRINGE (ML) INJECTION PRN
Status: DISCONTINUED | OUTPATIENT
Start: 2021-01-02 | End: 2021-01-02 | Stop reason: SDUPTHER

## 2021-01-02 RX ORDER — SODIUM CHLORIDE 9 MG/ML
INJECTION, SOLUTION INTRAVENOUS CONTINUOUS PRN
Status: DISCONTINUED | OUTPATIENT
Start: 2021-01-02 | End: 2021-01-02 | Stop reason: SDUPTHER

## 2021-01-02 RX ORDER — PHENYLEPHRINE HCL IN 0.9% NACL 1 MG/10 ML
SYRINGE (ML) INTRAVENOUS PRN
Status: DISCONTINUED | OUTPATIENT
Start: 2021-01-02 | End: 2021-01-02 | Stop reason: SDUPTHER

## 2021-01-02 RX ADMIN — HYDROXYUREA 500 MG: 500 CAPSULE ORAL at 08:49

## 2021-01-02 RX ADMIN — DULOXETINE HYDROCHLORIDE 30 MG: 30 CAPSULE, DELAYED RELEASE ORAL at 08:48

## 2021-01-02 RX ADMIN — MORPHINE SULFATE 4 MG: 4 INJECTION, SOLUTION INTRAMUSCULAR; INTRAVENOUS at 02:17

## 2021-01-02 RX ADMIN — HYDROXYUREA 500 MG: 500 CAPSULE ORAL at 19:58

## 2021-01-02 RX ADMIN — CEFAZOLIN 3 G: 10 INJECTION, POWDER, FOR SOLUTION INTRAVENOUS at 02:18

## 2021-01-02 RX ADMIN — PREGABALIN 300 MG: 100 CAPSULE ORAL at 08:48

## 2021-01-02 RX ADMIN — HEPARIN SODIUM 500 UNITS/HR: 10000 INJECTION, SOLUTION INTRAVENOUS at 03:58

## 2021-01-02 RX ADMIN — CYCLOBENZAPRINE 10 MG: 10 TABLET, FILM COATED ORAL at 08:49

## 2021-01-02 RX ADMIN — MORPHINE SULFATE 4 MG: 4 INJECTION, SOLUTION INTRAMUSCULAR; INTRAVENOUS at 08:52

## 2021-01-02 RX ADMIN — PREGABALIN 300 MG: 100 CAPSULE ORAL at 19:58

## 2021-01-02 RX ADMIN — CYCLOBENZAPRINE 10 MG: 10 TABLET, FILM COATED ORAL at 14:30

## 2021-01-02 RX ADMIN — FERROUS SULFATE TAB 325 MG (65 MG ELEMENTAL FE) 325 MG: 325 (65 FE) TAB at 19:58

## 2021-01-02 RX ADMIN — PROPOFOL 50 MCG/KG/MIN: 10 INJECTION, EMULSION INTRAVENOUS at 11:15

## 2021-01-02 RX ADMIN — OXYCODONE AND ACETAMINOPHEN 1 TABLET: 5; 325 TABLET ORAL at 01:12

## 2021-01-02 RX ADMIN — CEFAZOLIN 3 G: 10 INJECTION, POWDER, FOR SOLUTION INTRAVENOUS at 17:47

## 2021-01-02 RX ADMIN — SODIUM CHLORIDE, PRESERVATIVE FREE 10 ML: 5 INJECTION INTRAVENOUS at 15:40

## 2021-01-02 RX ADMIN — FENOFIBRATE 54 MG: 54 TABLET ORAL at 08:48

## 2021-01-02 RX ADMIN — Medication 10 ML: at 20:03

## 2021-01-02 RX ADMIN — POTASSIUM CHLORIDE 20 MEQ: 1500 TABLET, EXTENDED RELEASE ORAL at 16:25

## 2021-01-02 RX ADMIN — MIDAZOLAM 1 MG: 1 INJECTION INTRAMUSCULAR; INTRAVENOUS at 11:00

## 2021-01-02 RX ADMIN — SODIUM CHLORIDE: 9 INJECTION, SOLUTION INTRAVENOUS at 10:57

## 2021-01-02 RX ADMIN — CYCLOBENZAPRINE 10 MG: 10 TABLET, FILM COATED ORAL at 19:58

## 2021-01-02 RX ADMIN — MORPHINE SULFATE 4 MG: 4 INJECTION, SOLUTION INTRAMUSCULAR; INTRAVENOUS at 17:43

## 2021-01-02 RX ADMIN — ALTEPLASE 1 MG/HR: 2.2 INJECTION, POWDER, LYOPHILIZED, FOR SOLUTION INTRAVENOUS at 01:14

## 2021-01-02 RX ADMIN — Medication 10 ML: at 08:49

## 2021-01-02 RX ADMIN — FLUTICASONE PROPIONATE 2 SPRAY: 50 SPRAY, METERED NASAL at 08:49

## 2021-01-02 RX ADMIN — FERROUS SULFATE TAB 325 MG (65 MG ELEMENTAL FE) 325 MG: 325 (65 FE) TAB at 08:48

## 2021-01-02 RX ADMIN — INSULIN GLARGINE 25 UNITS: 100 INJECTION, SOLUTION SUBCUTANEOUS at 19:59

## 2021-01-02 RX ADMIN — Medication 25 MG: at 11:24

## 2021-01-02 RX ADMIN — Medication 0.1 MG: at 11:05

## 2021-01-02 RX ADMIN — LISINOPRIL 5 MG: 10 TABLET ORAL at 08:48

## 2021-01-02 RX ADMIN — PANTOPRAZOLE SODIUM 40 MG: 40 TABLET, DELAYED RELEASE ORAL at 06:47

## 2021-01-02 RX ADMIN — MORPHINE SULFATE 4 MG: 4 INJECTION, SOLUTION INTRAMUSCULAR; INTRAVENOUS at 19:57

## 2021-01-02 RX ADMIN — POTASSIUM CHLORIDE 20 MEQ: 1500 TABLET, EXTENDED RELEASE ORAL at 08:48

## 2021-01-02 RX ADMIN — OXYCODONE AND ACETAMINOPHEN 1 TABLET: 5; 325 TABLET ORAL at 21:07

## 2021-01-02 RX ADMIN — PRAVASTATIN SODIUM 20 MG: 20 TABLET ORAL at 19:58

## 2021-01-02 RX ADMIN — METOPROLOL SUCCINATE 25 MG: 25 TABLET, EXTENDED RELEASE ORAL at 08:48

## 2021-01-02 RX ADMIN — MORPHINE SULFATE 4 MG: 4 INJECTION, SOLUTION INTRAMUSCULAR; INTRAVENOUS at 15:39

## 2021-01-02 RX ADMIN — MORPHINE SULFATE 4 MG: 4 INJECTION, SOLUTION INTRAMUSCULAR; INTRAVENOUS at 22:03

## 2021-01-02 RX ADMIN — OXYCODONE AND ACETAMINOPHEN 1 TABLET: 5; 325 TABLET ORAL at 16:25

## 2021-01-02 RX ADMIN — Medication 50 MCG: at 11:31

## 2021-01-02 RX ADMIN — MORPHINE SULFATE 4 MG: 4 INJECTION, SOLUTION INTRAMUSCULAR; INTRAVENOUS at 00:05

## 2021-01-02 RX ADMIN — CEFAZOLIN 3 G: 10 INJECTION, POWDER, FOR SOLUTION INTRAVENOUS at 10:15

## 2021-01-02 RX ADMIN — MIDAZOLAM 1 MG: 1 INJECTION INTRAMUSCULAR; INTRAVENOUS at 11:03

## 2021-01-02 ASSESSMENT — PAIN DESCRIPTION - PAIN TYPE
TYPE: ACUTE PAIN;CHRONIC PAIN
TYPE: CHRONIC PAIN;ACUTE PAIN

## 2021-01-02 ASSESSMENT — PAIN SCALES - GENERAL
PAINLEVEL_OUTOF10: 10
PAINLEVEL_OUTOF10: 9
PAINLEVEL_OUTOF10: 0
PAINLEVEL_OUTOF10: 8

## 2021-01-02 ASSESSMENT — PAIN DESCRIPTION - LOCATION
LOCATION: LEG
LOCATION: LEG;BACK
LOCATION: LEG;BACK

## 2021-01-02 ASSESSMENT — PAIN DESCRIPTION - ORIENTATION: ORIENTATION: LEFT

## 2021-01-02 ASSESSMENT — PAIN DESCRIPTION - FREQUENCY
FREQUENCY: CONTINUOUS

## 2021-01-02 ASSESSMENT — PAIN DESCRIPTION - DESCRIPTORS: DESCRIPTORS: ACHING;BURNING;CONSTANT;DISCOMFORT

## 2021-01-02 NOTE — OP NOTE
Cardiovascular Lab Procedure Report    Deann Powell  1975    Date : 1/2/2021  Surgeon: Estella Rodríguez M.D. Pre-procedure Diagnosis: Left lower extremity DVT, IVC thrombosis  Post-procedure Diagnosis: Same  Procedure:    [id number] FU  Venogram of IVC and Left lower extremity with existing lysis catheter    Anesthesia: Local with IV sedation  Assistants: Cath Lab Staff  Estimated Blood Loss: Minimal  Complications: none  Findings:    Left    Inferior Vena Cava Thrombosed at level of filter and below but some flow   Common Iliac Vein Thrombus present but flow now present   External Iliac Vein Thrombus present but flow now present   Common Femoral Vein Some flow but large amount of residual thrombus   Superficial Femoral Vein patent     Procedure Details :  Timeout preformed identifying pt and procedure. Left popliteal region prepped and draped in sterile fashion. Patient given sedation as needed throughout the case. Preexisting 6 fr sheath with EKOS lsysis catheter was already in place in the Left popliteal vein. Venogram preformed of  Left lower extremity and inferior vena cava. Minimal improvement noted. Catheter removed and wire pulled back to common femoral    Reaccess of the stent. Catheter placed noting some improvement but significant residual thrombus still.    ekos catheter replaced above the level of the filter after venogram confirmed location. Suspect catheter was behind the stent not in previously.     Plan  Will take back to angio tomorrow  Serial labs  Neuro exams    Estella Rodríguez 20.3

## 2021-01-02 NOTE — PROGRESS NOTES
563.  Consultation evaluation and recommendations acute DVT of LLE and for failure of eliquis.         Diagnostic Data:     Past Medical History:   Diagnosis Date    Accident 11/2019    stepped on nail rt ft about 1 month ago- healed per patient    Acute thrombosis of inferior vena cava ([address] 10/10/2020    SIMÓN (acute kidney injury) ([address] 2008 apx    kidney bruised due to fall / Roseline Cambric    Allergic rhinitis     Chronic back pain     Depression     Diabetes mellitus ([address]     Difficulty sleeping     at times    Displacement of lumbar intervertebral disc without myelopathy     Fibromyalgia     Fractured rib     2008 / healed    H/O seasonal allergies     Head injury 1980'S apx    no residual s/s    Hyperlipidemia     Hypertension     Obesity (BMI 35.0-39.9 without comorbidity)     bmi 39.2  weight 296 #    Osteoarthritis     Thoracic or lumbosacral neuritis or radiculitis, unspecified        Patient Active Problem List    Diagnosis Date Noted    DVT, lower extremity, recurrent, bilateral ([address] 12/31/2020    Deep vein thrombosis (DVT) of both lower extremities ([address] 11/19/2020    Ground glass opacity present on imaging of lung 11/19/2020    Tobacco dependence 11/19/2020    Anticoagulated 11/19/2020    Back pain 11/19/2020    Cellulitis 11/18/2020    Acute thrombosis of inferior vena cava ([address] 10/10/2020    COVID-19 virus infection 10/09/2020    Acute deep vein thrombosis (DVT) ([address] 10/07/2020    SIMÓN (acute kidney injury) ([address] 09/17/2020    Septicemia ([address] 09/17/2020    Intra-abdominal infection 09/17/2020    Acute GI bleeding 09/03/2020    Acute blood loss anemia     Skin wound from surgical incision 08/13/2020    Thrombocytosis ([address] 08/10/2020    Decubitus ulcer of sacral area 08/01/2020    Abnormal findings on diagnostic imaging of gall bladder 08/01/2020    Splenic infarction 08/01/2020    Cyst of spleen 08/01/2020    Splenic abscess 08/01/2020    Anemia 08/01/2020    Other pulmonary embolism without acute cor pulmonale ([facility] 08/01/2020    Enterocolitis 07/31/2020    Rectus diastasis 11/01/2019    Recurrent unilateral inguinal hernia 11/01/2019    Cellulitis of sacral region 07/02/2019    Cellulitis of foot 06/30/2019    Neuropathy 06/30/2019    Cellulitis, wound, post-operative 06/30/2019    Left leg swelling 06/30/2019    SIRS (systemic inflammatory response syndrome) ([facility] 06/30/2019    Other hyperlipidemia 10/13/2017    Primary osteoarthritis of right hip 11/03/2016    Primary osteoarthritis of left hip 04/11/2016    Type 2 diabetes mellitus ([address] 04/08/2016    Lumbar disc herniation 02/22/2016    Lumbar radiculopathy 12/17/2015    Osteoarthritis of spine with radiculopathy, lumbar region 12/17/2015    Class 1 obesity in adult 12/17/2015    Allergic rhinitis 11/23/2015    Essential hypertension 10/16/2015    Vitamin D deficiency 04/14/2015    Fibromyalgia 12/15/2014    Insomnia 07/04/2014    Osteoarthritis 03/27/2014    Lumbar spondylosis 01/07/2014    Neuropathic pain 05/08/2012        Past Surgical History:   Procedure Laterality Date    COLONOSCOPY N/A 9/5/2020    COLONOSCOPY WITH BIOPSY performed by Juno Medrano MD at [address] [facility] CT PTC NEW ACCESS  8/3/2020    CT PTC NEW ACCESS 8/3/2020 [facility] CT    FOOT SURGERY Right 1985    to treat shattered bones    HERNIA REPAIR  2001    DOUBLE HERNIA    HERNIA REPAIR Right 12/9/2019    LAPAROSCOPIC RIGHT INGUINAL HERNIA REPAIR, MESH 10x15 cm PLACEMENT performed by Alexander Urbina MD at [address] [place] [address] Left 4/11/2016    ILIAC ARTERY STENT INSERTION N/A 10/11/2020    S/P ILIAC STENT PLACEMENT VISUALIZATION performed by Estella Rodríguez MD at [place] [address] N/A 9/18/2020    LAPAROTOMY EXPLORATORY, CHOLECYSTECTOMY, BOWEL RESECTION, right darian colectomy, partial omentectomy, and splenectomy performed by Juno Medrano MD at [address] [place] [address] TABS tablet Take 1 tablet by mouth 2 times daily 10/15/20   Adalgisa Pacheco MD   lisinopril (PRINIVIL;ZESTRIL) 5 MG tablet Take 1 tablet by mouth daily 10/16/20   Adalgisa Pacheco MD   metoprolol succinate (TOPROL XL) 25 MG extended release tablet Take 1 tablet by mouth daily 10/16/20   Adalgisa Pacheco MD   pregabalin (LYRICA) 150 MG capsule Take 300 mg by mouth 2 times daily.     Historical Provider, MD   cholestyramine (QUESTRAN) 4 g packet Take 1 packet by mouth 2 times daily 9/26/20   Justus Adame MD   glucose (GLUTOSE) 40 % GEL Take 37.5 mLs by mouth as needed (low sugar) 9/26/20   Justus Adame MD   insulin glargine (LANTUS) 100 UNIT/ML injection vial Inject 25 Units into the skin Daily 9/27/20   Justus Adame MD   melatonin 3 MG TABS tablet Take 3 mg by mouth nightly as needed (sleep)    Historical Provider, MD   cyclobenzaprine (FLEXERIL) 10 MG tablet Take 10 mg by mouth three times daily    Historical Provider, MD   fenofibrate (TRICOR) 54 MG tablet Take 54 mg by mouth daily    Historical Provider, MD   ferrous sulfate (IRON 325) 325 (65 Fe) MG tablet Take 325 mg by mouth 2 times daily    Historical Provider, MD   hydroxyurea (HYDREA) 500 MG chemo capsule Take 500 mg by mouth 2 times daily    Historical Provider, MD   loperamide (IMODIUM) 2 MG capsule Take 2 mg by mouth 4 times daily as needed for Diarrhea    Historical Provider, MD   insulin lispro (HUMALOG) 100 UNIT/ML injection vial Inject 3 Units into the skin 3 times daily (before meals) Injects 3 units three times daily before meals in addition to following sliding scale  : 0 units  141-180: 1 unit  181-220: 2 units  221-260: 3 units  261-300: 4 units  301-340: 5 units  >341: 6 units and call MD    Historical Provider, MD   pantoprazole (PROTONIX) 40 MG tablet Take 40 mg by mouth daily    Historical Provider, MD   DULoxetine (CYMBALTA) 30 MG extended release capsule Take 1 capsule by mouth daily 5/14/20   Brian Chaidez, APRN - CNP   pravastatin (PRAVACHOL) 20 MG tablet Take 1 tablet by mouth nightly 5/14/20   Alise Avendano APRN - CNP       Allergies  Allergies   Allergen Reactions    Seasonal      HAYFEVER / sneezing,watery eyes    Tramadol Itching       Review of Systems: Patient to angio when rounding.  Constitutional:  No fever chills or rigors.  Eyes: No changes in vision, discharge, or pain   ENT: No Headaches, hearing loss or vertigo. No mouth sores or sore throat. No change in taste or smell.  Cardiovascular: No chest discomfort, dyspnea on exertion, palpitations or loss of consciousness. or phlebitis.  Respiratory: Has no cough or wheezing, Has no sputum production. Has no hemoptysis, Has no pleuritic pain, .  Gastrointestinal: No abdominal pain, appetite loss, blood in stools. No change in bowel habits. No hematemesis    Genitourinary: Patient acknowledges no dysuria, trouble voiding, or hematuria. No nocturia or increased frequency.  Musculoskeletal: No gait disturbance, weakness or joint complaints.  Integumentary: No rash or pruritis.  Neurological: No headache, diplopia, change in muscle strength, numbness or tingling. No change in gait, balance, coordination, mood, affect, memory, mentation, behavior.  Psychiatric: No anxiety, or depression.  Endocrine: No temperature intolerance. No excessive thirst, fluid intake, or urination. No tremor.  Hematologic/Lymphatic: No abnormal bruising or bleeding, blood clots or swollen lymph nodes.  Allergic/Immunologic: No nasal congestion or hives. Objective  BP 94/70   Pulse 96   Temp 98.3 °F (36.8 °C) (Oral)   Resp 12   Wt 290 lb 2 oz (131.6 kg)   SpO2 100%   BMI 38.28 kg/m²     Physical Exam:   Performance Status:  General: AAO to person, place, time, in no acute distress,   Head and neck : PERRLA, EOMI . Sclera non icteric.   Oropharynx : Clear  Neck: no JVD,  no adenopathy  Heart: Regular rate and regular rhythm, no murmur  Lungs: Clear to auscultation   Extremities: No edema,no cyanosis, no clubbing. Abdomen: Soft, non-tender;no masses, no organomegaly  Skin:  No rash. Neurologic:Cranial nerves grossly intact. No focal motor or sensory deficits . Recent Laboratory Data-   Lab Results   Component Value Date    WBC 8.7 01/02/2021    HGB 11.6 (L) 01/02/2021    HCT 35.5 (L) 01/02/2021    .1 (H) 01/02/2021     01/02/2021    LYMPHOPCT 33.0 12/30/2020    RBC 3.41 (L) 01/02/2021    MCH 34.0 01/02/2021    MCHC 32.7 01/02/2021    RDW 18.6 (H) 01/02/2021    NEUTOPHILPCT 47.9 12/30/2020    MONOPCT 12.9 (H) 12/30/2020    BASOPCT 1.6 12/30/2020    NEUTROABS 3.58 12/30/2020    LYMPHSABS 2.46 12/30/2020    MONOSABS 0.96 (H) 12/30/2020    EOSABS 0.32 12/30/2020    BASOSABS 0.12 12/30/2020       Lab Results   Component Value Date     01/02/2021    K 4.1 01/02/2021     01/02/2021    CO2 26 01/02/2021    BUN 13 01/02/2021    CREATININE 0.9 01/02/2021    GLUCOSE 114 (H) 01/02/2021    CALCIUM 9.1 01/02/2021    PROT 7.0 12/30/2020    LABALBU 4.4 12/30/2020    BILITOT <0.2 12/30/2020    ALKPHOS 126 12/30/2020    AST 20 12/30/2020    ALT 30 12/30/2020    LABGLOM >60 01/02/2021    GFRAA >60 01/02/2021       Lab Results   Component Value Date    IRON 24 (L) 08/01/2020    TIBC 145 (L) 08/01/2020    FERRITIN 539 10/10/2020           Radiology-    Ct Abdomen Pelvis W Iv Contrast Additional Contrast? None    Result Date: 12/31/2020  EXAMINATION: CT OF THE ABDOMEN AND PELVIS WITH CONTRAST 12/31/2020 1:30 am TECHNIQUE: CT of the abdomen and pelvis was performed with the administration of intravenous contrast. Multiplanar reformatted images are provided for review. Dose modulation, iterative reconstruction, and/or weight based adjustment of the mA/kV was utilized to reduce the radiation dose to as low as reasonably achievable.  COMPARISON: 09/17/2020 HISTORY: ORDERING SYSTEM PROVIDED HISTORY: BLE DVT r/o IVC clot TECHNOLOGIST PROVIDED HISTORY: CT venogram Reason for exam:->BLE DVT r/o IVC clot Additional Contrast?->None What reading provider will be dictating this exam?->CRC FINDINGS: Lower Chest: The lung bases are clear. No pleural or pericardial effusions. Organs: The liver, pancreas, adrenal glands, and kidneys are normal.  The gallbladder is surgically absent. Previous splenectomy. GI/Bowel: The unopacified stomach and small bowel loops are unremarkable. Fluid-filled loops of small bowel measure up to 2.6 cm in diameter. Postsurgical changes in the proximal colon. The colon is intact. Pelvis: Streak artifact from hip arthroplasties partially obscures the pelvis. Bladder is fluid filled and is unremarkable. Peritoneum/Retroperitoneum: Abdominal aorta has normal caliber. No free fluid or free air. No enlarged lymph nodes. Infrarenal IVC filter present. Interval placement of left common iliac and external iliac vein stent. There is some relative low-attenuation within the left common iliac vein stent as well as within the IVC below the level of the filter relative to the IVC above the filter. Bones/Soft Tissues: Sacral decubitus ulcer. Surrounding soft tissue thickening. No destructive osseous lesions. 1. Infrarenal IVC filter in place. Interval placement of left common iliac and external iliac vein stent. There is low-attenuation within the IVC below the stent as well as within the left common iliac vein stent relative to the IVC above the stent. Findings are likely related to thrombus. 2. Fluid-filled loops of small bowel measure up to 2.6 cm in diameter suggesting partial small bowel obstruction or ileus. 3. Status post interval right hemicolectomy and splenectomy.         ASSESSMENT/PLAN :    54 yo male  BL PE s/p IVC  Hx GI bleed  S/p ex lap with cholecystectomy and R hemicolectomy with small bowel resection and side-to-side ileocolonic anastomosis with splenic thrombosis and infarction s/p splenectomy and omentectomy in October 2020 for an extensive left leg DVT that

## 2021-01-02 NOTE — PROGRESS NOTES
Hospitalist Progress Note      SYNOPSIS: Patient admitted on 2020 for DVT, lower extremity, recurrent, bilateral [facility])    Hospital Course: 50 y.o. male with PMH of DM, HTN, HLD, thrombocytosis, chronic pain, DVTs and PEs on Eliquis with IVC, GI bleed s/p cholecystectomy/right hemicolectomy with resection and side-to-side ileocolonic anastamosis, splenic thrombosis and infarction s/p splenectomy and omenectomy who presented to [facility] with bilateral leg pain. He states he has had bilateral leg pain and swelling radiating from his groin to his ankles for the past two days that has not improved and described as burning pain. He is on anticoagulation with an IVC in place for known DVTs and PEs. He was sent from the facility for worsening symptoms. He was hospitalized in 2020 with extensive left leg DVT requiring EKOS and thrombolytics and was COVID positive at that time. SUBJECTIVE:    Patient seen and examined  Records reviewed. EKOS catheter in place left posterior leg. Patient sleeping with oxygen mask in place, snoring respirations. Per bedside nurse had versed this morning for procedure and has been sleeping since. Stable overnight. No other overnight issues reported. Temp (24hrs), Av °F (36.7 °C), Min:97.8 °F (36.6 °C), Max:98.3 °F (36.8 °C)    DIET: Diet NPO Effective Now Exceptions are: Ice Chips, Sips with Meds  CODE: Full Code    Intake/Output Summary (Last 24 hours) at 2021 0919  Last data filed at 2021 0600  Gross per 24 hour   Intake 2505 ml   Output 1150 ml   Net 1355 ml       Review of Systems  All bolded are positive; please see HPI  General:  Fever, chills, diaphoresis, fatigue, malaise, night sweats, weight loss  Psychological:  Anxiety, disorientation, hallucinations. ENT:  Epistaxis, headaches, vertigo, visual changes. Cardiovascular:  Chest pain, irregular heartbeats, palpitations, paroxysmal nocturnal dyspnea.   Respiratory: Shortness of breath, coughing, sputum production, hemoptysis, wheezing, orthopnea. Gastrointestinal:  Nausea, vomiting, diarrhea, heartburn, constipation, abdominal pain, hematemesis, hematochezia, melena, acholic stools  Genito-Urinary:  Dysuria, urgency, frequency, hematuria  Musculoskeletal:  Joint pain, joint stiffness, joint swelling, muscle pain, leg pain and swelling  Neurology:  Headache, focal neurological deficits, weakness, numbness, paresthesia  Derm:  Rashes, ulcers, excoriations, bruising  Extremities:  Decreased ROM, peripheral edema, mottling      OBJECTIVE:    /85   Pulse 100   Temp 98.3 °F (36.8 °C)   Resp 13   Wt 290 lb 2 oz (131.6 kg)   SpO2 93%   BMI 38.28 kg/m²     General appearance:  Sleeping; appears stated age; no apparent distress no labored breathing  HEENT:  Conjunctivae/corneas clear. Neck: Supple. No jugular venous distention. Respiratory: symmetrical; clear to auscultation bilaterally; no wheezes; no rhonchi; no rales, snoring respirations  Cardiovascular: rhythm regular; rate controlled; no murmurs  Abdomen: Soft, nontender, nondistended  Extremities:  peripheral pulses present; + peripheral edema; no ulcers, LLE catheter with dressing over catheter  Musculoskeletal: No clubbing, cyanosis, no bilateral lower extremity edema. Brisk capillary refill.    Skin:  No rashes on visible skin  Neurologic: sleeping    ASSESSMENT and PLAN:    Recurrent bilateral lower extremity DVT with failure of Eliquis  Thrombocytosis  Type 2 DM  Obesity  Essential hypertension    Pain control  Daily weights  I/Os  Neurovascular checks, vascular following  Lantus daily, sliding scale, monitor blood glucose  Remainder per critical care team  lisinopril, metoprolol, monitor blood pressure  Statin at night  Oncology following, on hydroxyurea      DISPOSITION: facility  Diet: NPO  PT/OT: when ok with vascular  Code Status: Full code  GI Prophylaxis: Protonix  DVT Prophylaxis: heparin 11/19/2020    LDLCALC 47 11/19/2020    TSH 0.833 09/18/2020    INR 1.1 11/18/2020    LABA1C 5.1 11/19/2020       Radiology: REVIEWED DAILY    +++++++++++++++++++++++++++++++++++++++++++++++++  [address], [place]  +++++++++++++++++++++++++++++++++++++++++++++++++

## 2021-01-02 NOTE — PROGRESS NOTES
Vascular Surgery Progress Note    Pt is being seen in f/u today regarding L LE DVT, IVC [id number]  Subjective  Pt s/e. Swelling and pain is better but still present. Denies sob or chest pain.   Pain better  Current Medications:    dextrose      alteplase (ACTIVASE) 10 mg in 0.9% sodium chloride infusion 100 mL 1 mg/hr (01/02/21 0114)    heparin (PORCINE) Infusion 500 Units/hr (01/02/21 0358)    sodium chloride 35 mL/hr at 01/01/21 1400      heparin irrigation, morphine, oxyCODONE-acetaminophen, glucose, dextrose, glucagon (rDNA), dextrose, loperamide, melatonin, sodium chloride flush, acetaminophen, ondansetron    insulin lispro  0-10 Units Subcutaneous 4x Daily AC & HS    cholestyramine  1 packet Oral BID    cyclobenzaprine  10 mg Oral TID    DULoxetine  30 mg Oral Daily    fenofibrate  54 mg Oral Daily    ferrous sulfate  325 mg Oral BID    fluticasone  2 spray Each Nostril Daily    hydroxyurea  500 mg Oral BID    insulin glargine  25 Units Subcutaneous Daily    insulin lispro  3 Units Subcutaneous TID AC    insulin lispro  0-3 Units Subcutaneous Nightly    lisinopril  5 mg Oral Daily    metoprolol succinate  25 mg Oral Daily    pantoprazole  40 mg Oral Daily    potassium chloride  20 mEq Oral BID WC    pravastatin  20 mg Oral Nightly    pregabalin  300 mg Oral BID    sodium chloride flush  10 mL Intravenous 2 times per day    ceFAZolin (ANCEF) IVPB  3 g Intravenous Q8H      PHYSICAL EXAM:    BP 92/69   Pulse 92   Temp 98.3 °F (36.8 °C) (Oral)   Resp 13   Wt 290 lb 2 oz (131.6 kg)   SpO2 91%   BMI 38.28 kg/m²     Intake/Output Summary (Last 24 hours) at 1/2/2021 1143  Last data filed at 1/2/2021 1143  Gross per 24 hour   Intake 2805 ml   Output 1150 ml   Net 1655 ml        Gen awake and alert  CVS S1S2  Resp good resp excursion  Abd soft nt nd  L LE + edema and swelling better but still significant in calf and htigh   Lysis catheter in place with sheath, no hemoatoma appreciated  LABS:    Lab Results   Component Value Date    WBC 8.7 01/02/2021    HGB 11.6 (L) 01/02/2021    HCT 35.5 (L) 01/02/2021     01/02/2021    PROTIME 12.8 (H) 11/18/2020    INR 1.1 11/18/2020    APTT 36.0 (H) 01/02/2021    K 4.1 01/02/2021    BUN 13 01/02/2021    CREATININE 0.9 01/02/2021     A/P s/p lysis catheter placement for LLE DVT, IVC thrombosis  · Fu imaging today  · Platelets, hgb, fibrinogen stable  I reviewed the procedure with the patient and family as available. I discussed the procedure, risks, benefits, complications, and alternatives of the procedure. They understand and consent.   All questions were answered    Johana Redd

## 2021-01-02 NOTE — ANESTHESIA POSTPROCEDURE EVALUATION
Department of Anesthesiology  Postprocedure Note    Patient: Leonora Ramirez  MRN: [id number]  YOB: 1975  Date of evaluation: 1/2/2021  Time:  12:05 PM     Procedure Summary     Date: 01/02/21 Room / Location: [facility] OR 03 / [facility]    Anesthesia Start: 1404 Anesthesia Stop: 1200    Procedure: LEFT LOWER EXTREMITY VENOGRAM (Left ) Diagnosis: (VENOGRAM)    Surgeons: Radha Jade MD Responsible Provider: Tanner Munoz MD    Anesthesia Type: MAC ASA Status: 4          Anesthesia Type: MAC    Cristal Phase I:      Cristal Phase II:      Last vitals: Reviewed and per EMR flowsheets.        Anesthesia Post Evaluation    Patient location during evaluation: PACU  Patient participation: complete - patient participated  Level of consciousness: awake  Pain score: 0  Airway patency: patent  Nausea & Vomiting: no nausea  Complications: no  Cardiovascular status: hemodynamically stable  Respiratory status: acceptable  Hydration status: stable

## 2021-01-02 NOTE — ANESTHESIA PRE PROCEDURE
Department of Anesthesiology  Preprocedure Note       Name:  Juan Lau   Age:  50 y.o.  :  1975                                          MRN:  [id number]         Date:  2021      Surgeon: Clement Young):  Bennie Amaral MD    Procedure: Procedure(s):  LEFT LOWER EXTREMETIY LYSIS CATHETER REMOVAL FOLLOW UP, VENOGRAM, REMOVAL LYSIS CATHETER    Medications prior to admission:   Prior to Admission medications    Medication Sig Start Date End Date Taking? Authorizing Provider   furosemide (LASIX) 20 MG tablet Take 1 tablet by mouth daily for 7 days 20  Madelyn Hill MD   fluticasone [facility]) 50 MCG/ACT nasal spray instill 2 sprays into each nostril once daily 20   IGOR García - CNP   Elastic Bandages & Supports (JOBST KNEE HIGH COMPRESSION SM) MISC Knee high compression stockings, 20-30 mm/Hg 11/10/20   Gemma Pereyra MD   insulin lispro (HUMALOG) 100 UNIT/ML injection vial Inject 0-3 Units into the skin nightly **Corrective Bedtime (50%) Low Dose Algorithm**   Glucose: Dose:                No Insulin   140-249 1 Unit   250-349 2 Units   Over 350 3 Units 10/15/20   Salome Rausch MD   potassium chloride (KLOR-CON M) 20 MEQ extended release tablet Take 1 tablet by mouth 2 times daily (with meals) 10/15/20   Salome Rausch MD   apixaban (ELIQUIS) 5 MG TABS tablet Take 1 tablet by mouth 2 times daily 10/15/20   Salome Rausch MD   lisinopril (PRINIVIL;ZESTRIL) 5 MG tablet Take 1 tablet by mouth daily 10/16/20   Salome Rausch MD   metoprolol succinate (TOPROL XL) 25 MG extended release tablet Take 1 tablet by mouth daily 10/16/20   Salome Rausch MD   pregabalin (LYRICA) 150 MG capsule Take 300 mg by mouth 2 times daily.     Historical Provider, MD   cholestyramine (QUESTRAN) 4 g packet Take 1 packet by mouth 2 times daily 20   Onelia Hoffmann MD   glucose (GLUTOSE) 40 % GEL Take 37.5 mLs by mouth as needed (low sugar) 20   Onelia Hoffmann MD   insulin glargine (LANTUS) 100 UNIT/ML injection vial Inject 25 Units into the skin Daily 9/27/20   Moira Bosworth, MD   melatonin 3 MG TABS tablet Take 3 mg by mouth nightly as needed (sleep)    Historical Provider, MD   cyclobenzaprine (FLEXERIL) 10 MG tablet Take 10 mg by mouth three times daily    Historical Provider, MD   fenofibrate (TRICOR) 54 MG tablet Take 54 mg by mouth daily    Historical Provider, MD   ferrous sulfate (IRON 325) 325 (65 Fe) MG tablet Take 325 mg by mouth 2 times daily    Historical Provider, MD   hydroxyurea (HYDREA) 500 MG chemo capsule Take 500 mg by mouth 2 times daily    Historical Provider, MD   loperamide (IMODIUM) 2 MG capsule Take 2 mg by mouth 4 times daily as needed for Diarrhea    Historical Provider, MD   insulin lispro (HUMALOG) 100 UNIT/ML injection vial Inject 3 Units into the skin 3 times daily (before meals) Injects 3 units three times daily before meals in addition to following sliding scale  : 0 units  141-180: 1 unit  181-220: 2 units  221-260: 3 units  261-300: 4 units  301-340: 5 units  >341: 6 units and call MD    Historical Provider, MD   pantoprazole (PROTONIX) 40 MG tablet Take 40 mg by mouth daily    Historical Provider, MD   DULoxetine (CYMBALTA) 30 MG extended release capsule Take 1 capsule by mouth daily 5/14/20   Gia Telles APRN - CNP   pravastatin (PRAVACHOL) 20 MG tablet Take 1 tablet by mouth nightly 5/14/20   Gia Telles, APRN - CNP       Current medications:    No current facility-administered medications for this visit.       Current Outpatient Medications   Medication Sig Dispense Refill    furosemide (LASIX) 20 MG tablet Take 1 tablet by mouth daily for 7 days 7 tablet 0     Facility-Administered Medications Ordered in Other Visits   Medication Dose Route Frequency Provider Last Rate Last Admin    morphine sulfate (PF) injection 4 mg  4 mg Intravenous Q2H PRN Nikolas Ignacio MD   4 mg at 01/02/21 0852    oxyCODONE-acetaminophen (PERCOCET) 5-325 MG per tablet 1 tablet  1 tablet Oral Q4H PRN Yadi Calero MD   1 tablet at 01/02/21 0112    insulin lispro (HUMALOG) injection vial 0-10 Units  0-10 Units Subcutaneous 4x Daily AC & HS Yadi Calero MD   2 Units at 01/01/21 1944    glucose (GLUTOSE) 40 % oral gel 15 g  15 g Oral PRN Yadi Calero MD        dextrose 50 % IV solution  12.5 g Intravenous PRN Yadi Calero MD        glucagon (rDNA) injection 1 mg  1 mg Intramuscular PRN Yadi Calero MD        dextrose 5 % solution  100 mL/hr Intravenous PRN Yadi Calero MD        alteplase (CATHFLO) 10 mg in sodium chloride 0.9 % 100 mL infusion  1 mg/hr Intracatheter Continuous Yadi Calero MD 10 mL/hr at 01/02/21 0114 1 mg/hr at 01/02/21 0114    heparin 25,000 units in dextrose 5% 250 mL infusion  500 Units/hr Intracatheter Continuous Yadi Calero MD 5 mL/hr at 01/02/21 0358 500 Units/hr at 01/02/21 0358    0.9 % sodium chloride infusion   Intracatheter Continuous Yadi Calero MD 35 mL/hr at 01/01/21 1400 New Bag at 01/01/21 1400    cholestyramine (QUESTRAN) packet 4 g  1 packet Oral BID Yadi Calero MD   4 g at 01/01/21 1943    cyclobenzaprine (FLEXERIL) tablet 10 mg  10 mg Oral TID Yadi Calero MD   10 mg at 01/02/21 0849    DULoxetine (CYMBALTA) extended release capsule 30 mg  30 mg Oral Daily Yadi Calero MD   30 mg at 01/02/21 0848    fenofibrate (TRICOR) tablet 54 mg  54 mg Oral Daily Yadi Calero MD   54 mg at 01/02/21 0848    ferrous sulfate (IRON 325) tablet 325 mg  325 mg Oral BID Yadi Calero MD   325 mg at 01/02/21 0848    fluticasone (FLONASE) 50 MCG/ACT nasal spray 2 spray  2 spray Each Nostril Daily Yadi Calero MD   2 spray at 01/02/21 0849    hydroxyurea (HYDREA) chemo capsule 500 mg  500 mg Oral BID Yadi Calero MD   500 mg at 01/02/21 0849    insulin glargine (LANTUS) injection vial 25 Units  25 Units Subcutaneous Daily Audrey Carrillo MD   25 Units at 01/01/21 1945    insulin lispro (HUMALOG) injection vial 3 Units  3 Units Subcutaneous TID AC Audrey Carrillo MD        insulin lispro (HUMALOG) injection vial 0-3 Units  0-3 Units Subcutaneous Nightly Audrey Carrillo MD        lisinopril (PRINIVIL;ZESTRIL) tablet 5 mg  5 mg Oral Daily Audrey Carrillo MD   5 mg at 01/02/21 0848    loperamide (IMODIUM) capsule 2 mg  2 mg Oral 4x Daily PRN Audrey Carrillo MD   2 mg at 01/01/21 1943    melatonin tablet 3 mg  3 mg Oral Nightly PRN Audrey Carrillo MD        metoprolol succinate (TOPROL XL) extended release tablet 25 mg  25 mg Oral Daily Audrey Carrillo MD   25 mg at 01/02/21 0848    pantoprazole (PROTONIX) tablet 40 mg  40 mg Oral Daily Audrey Carrillo MD   40 mg at 01/02/21 0647    potassium chloride (KLOR-CON M) extended release tablet 20 mEq  20 mEq Oral BID WC Audrey Carrillo MD   20 mEq at 01/02/21 0848    pravastatin (PRAVACHOL) tablet 20 mg  20 mg Oral Nightly Audrey Carrillo MD   20 mg at 01/01/21 1943    pregabalin (LYRICA) capsule 300 mg  300 mg Oral BID Audrey Carrillo MD   300 mg at 01/02/21 0848    sodium chloride flush 0.9 % injection 10 mL  10 mL Intravenous 2 times per day Audrey Carrillo MD   10 mL at 01/02/21 0849    sodium chloride flush 0.9 % injection 10 mL  10 mL Intravenous PRN Audrey Carrillo MD   10 mL at 01/01/21 1804    acetaminophen (TYLENOL) tablet 650 mg  650 mg Oral Q4H PRN Audrey Carrillo MD        ondansetron [facility]) injection 4 mg  4 mg Intravenous Q6H PRN Audrey Carrillo MD        ceFAZolin (ANCEF) 3 g in dextrose 5 % 100 mL IVPB  3 g Intravenous Michaela Gomez MD   Stopped at 01/02/21 0310       Allergies:     Allergies   Allergen Reactions    Seasonal      HAYFEVER / sneezing,watery eyes    Tramadol Itching Problem List:    Patient Active Problem List   Diagnosis Code    Neuropathic pain M79.2    Lumbar spondylosis M47.816    Osteoarthritis M19.90    Insomnia G47.00    Fibromyalgia M79.7    Vitamin D deficiency E55.9    Essential hypertension I10    Allergic rhinitis J30.9    Lumbar radiculopathy M54.16    Osteoarthritis of spine with radiculopathy, lumbar region M47.26    Class 1 obesity in adult E66.9    Lumbar disc herniation M51.26    Type 2 diabetes mellitus ([facility] [address] E11.9    Primary osteoarthritis of left hip M16.12    Primary osteoarthritis of right hip M16.11    Cellulitis of foot L03.119    Neuropathy G62.9    Cellulitis, wound, post-operative T81.49XA    Left leg swelling M79.89    SIRS (systemic inflammatory response syndrome) ([facility]) R65.10    Cellulitis of sacral region L03.319    Rectus diastasis M62.08    Recurrent unilateral inguinal hernia K40.91    Enterocolitis K52.9    Decubitus ulcer of sacral area L89.159    Abnormal findings on diagnostic imaging of gall bladder R93.2    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    Cyst of spleen D73.4    Splenic abscess D73.3    Anemia D64.9    Other pulmonary embolism without acute cor pulmonale ([facility]) I26.99    Thrombocytosis ([facility]) D47.3    Acute GI bleeding K92.2    Acute blood loss anemia D62    SIMÓN (acute kidney injury) ([facility]) N17.9    Septicemia ([facility]) A41.9    Intra-abdominal infection B99.9    Acute deep vein thrombosis (DVT) ([facility]) I82.409    Acute thrombosis of inferior vena cava ([facility]) I82.220    Skin wound from surgical incision T14. 8XXA    Other hyperlipidemia E78.49    COVID-19 virus infection U07.1    Cellulitis L03.90    Deep vein thrombosis (DVT) of both lower extremities ([facility]) I82.403    Ground glass opacity present on imaging of lung R91.8    Tobacco dependence F17.200    Anticoagulated Z79.01    Back pain M54.9    DVT, lower extremity, recurrent, bilateral ([facility]) I82.403       Past Medical History:        Diagnosis Date    Accident 11/2019    stepped on nail rt ft about 1 month ago- healed per patient    Acute thrombosis of inferior vena cava ([facility] [address] 10/10/2020    SIMÓN (acute kidney injury) ([facility] [address] 2008 apx    kidney bruised due to fall / Thermon Needle    Allergic rhinitis     Chronic back pain     Depression     Diabetes mellitus ([facility] [address]     Difficulty sleeping     at times    Displacement of lumbar intervertebral disc without myelopathy     Fibromyalgia     Fractured rib     2008 / healed    H/O seasonal allergies     Head injury 1980'S apx    no residual s/s    Hyperlipidemia     Hypertension     Obesity (BMI 35.0-39.9 without comorbidity)     bmi 39.2  weight 296 #    Osteoarthritis     Thoracic or lumbosacral neuritis or radiculitis, unspecified        Past Surgical History:        Procedure Laterality Date    COLONOSCOPY N/A 9/5/2020    COLONOSCOPY WITH BIOPSY performed by Alexander Weaver MD at [address] PTC NEW ACCESS  8/3/2020    CT PTC NEW ACCESS 8/3/2020 [facility] CT    FOOT SURGERY Right 1985    to treat shattered bones    HERNIA REPAIR  2001    DOUBLE HERNIA    HERNIA REPAIR Right 12/9/2019    LAPAROSCOPIC RIGHT INGUINAL HERNIA REPAIR, MESH 10x15 cm PLACEMENT performed by Le Chisholm MD at [facility] Left 4/11/2016    ILIAC ARTERY STENT INSERTION N/A 10/11/2020    S/P ILIAC STENT PLACEMENT VISUALIZATION performed by Maureen Taylor MD at [place] [address] N/A 9/18/2020    LAPAROTOMY EXPLORATORY, CHOLECYSTECTOMY, BOWEL RESECTION, right darian colectomy, partial omentectomy, and splenectomy performed by Alexander Weaver MD at [facility] COLONOSCOPY FLX DX W/MARIA ALEJANDRA Queen 1978 PFRMD N/A 5/7/2018    COLONOSCOPY DIAGNOSTIC performed by Alisson Rutherford MD at [address] [place] [address] Left 2014    Dr. Ash Jean ARTHROSCOPY Left 11 21 14    SHOULDER SURGERY  2001 &2007    RIGHT AND LEFT repair of tears    TOTAL HIP ARTHROPLASTY Right 10/31/2016    Total right hip  Meenakshi Taylor MD    UPPER GASTROINTESTINAL ENDOSCOPY N/A 9/4/2020    EGD DIAGNOSTIC ONLY performed by Sushma Loredo MD at [facility] 1 2007    LEFT WRIST       Social History:    Social History     Tobacco Use    Smoking status: Never Smoker    Smokeless tobacco: Current User     Types: Chew   Substance Use Topics    Alcohol use: Not Currently     Alcohol/week: 0.0 standard drinks     Frequency: Monthly or less                                Ready to quit: Not Answered  Counseling given: Not Answered      Vital Signs (Current): There were no vitals filed for this visit. BP Readings from Last 3 Encounters:   01/02/21 133/82   01/01/21 120/83   11/20/20 (!) 103/59       NPO Status:  more then 8 hrs                                                                               BMI:   Wt Readings from Last 3 Encounters:   01/01/21 290 lb 2 oz (131.6 kg)   11/18/20 237 lb (107.5 kg)   11/16/20 240 lb (108.9 kg)     There is no height or weight on file to calculate BMI.    CBC:   Lab Results   Component Value Date    WBC 7.7 01/02/2021    RBC 3.49 01/02/2021    HGB 11.8 01/02/2021    HCT 36.0 01/02/2021    .2 01/02/2021    RDW 18.4 01/02/2021     01/02/2021       CMP:   Lab Results   Component Value Date     01/02/2021    K 4.1 01/02/2021    K 4.3 12/30/2020     01/02/2021    CO2 26 01/02/2021    BUN 13 01/02/2021    CREATININE 0.9 01/02/2021    GFRAA >60 01/02/2021    LABGLOM >60 01/02/2021    GLUCOSE 114 01/02/2021    GLUCOSE 125 05/11/2012    PROT 7.0 12/30/2020    CALCIUM 9.1 01/02/2021    BILITOT <0.2 12/30/2020    ALKPHOS 126 12/30/2020    AST 20 12/30/2020    ALT 30 12/30/2020       POC Tests: No results for input(s): POCGLU, POCNA, POCK, POCCL, POCBUN, POCHEMO, POCHCT in the last 72 hours.     Coags:   Lab Results   Component Value Date    PROTIME 12.8 11/18/2020    INR 1.1 11/18/2020    APTT 38.5 01/02/2021       HCG (If Applicable): No results found for: PREGTESTUR, PREGSERUM, HCG, HCGQUANT     ABGs: No results found for: PHART, PO2ART, NCN9HJF, BDQ8IKA, BEART, [id number]     Type & Screen (If Applicable):  No results found for: LABABO, LABRH    Drug/Infectious Status (If Applicable):  No results found for: HIV, HEPCAB    COVID-19 Screening (If Applicable):   Lab Results   Component Value Date    COVID19 Not Detected 12/31/2020    COVID19 Not Detected 09/21/2020         Anesthesia Evaluation  Patient summary reviewed and Nursing notes reviewed no history of anesthetic complications:   Airway: Mallampati: III        Dental:      Comment: Several Missing. None loose. Pulmonary:Negative Pulmonary ROS breath sounds clear to auscultation                             Cardiovascular:    (+) hypertension:, past MI: > 6 months, CAD:,         Rhythm: regular  Rate: normal                 ROS comment: EF 60-65%     Neuro/Psych:   (+) neuromuscular disease:, psychiatric history:depression/anxiety             GI/Hepatic/Renal:            ROS comment: Obese. Endo/Other:    (+) DiabetesType II DM, , .                  ROS comment:  Abdominal:           Vascular:   + PE (5/20). ROS comment: IVC filter  LLE DVT  IVC thrombus. Anesthesia Plan      MAC     ASA 4             Anesthetic plan and risks discussed with patient. Plan discussed with CRNA. Pt assessed and interviewed prior to anesthesia (late entry)  Shyanne Presley MD   1/2/2021        Patient seen and examined, chart reviewed, agree with above findings. Anesthetic plan, risks, benefits, alternatives, and personnel involved discussed with patient. Patient verbalized an understanding and agreed to proceed. NPO status confirmed. Anesthetic plan discussed with care team members and agreed upon.     Shyanne Presley MD   1/2/2021  10:24 AM

## 2021-01-03 ENCOUNTER — ANESTHESIA (OUTPATIENT)
Dept: OPERATING ROOM | Age: 46
DRG: 180 | End: 2021-01-03
Payer: COMMERCIAL

## 2021-01-03 ENCOUNTER — ANESTHESIA EVENT (OUTPATIENT)
Dept: OPERATING ROOM | Age: 46
DRG: 180 | End: 2021-01-03
Payer: COMMERCIAL

## 2021-01-03 VITALS — OXYGEN SATURATION: 99 % | DIASTOLIC BLOOD PRESSURE: 67 MMHG | SYSTOLIC BLOOD PRESSURE: 95 MMHG

## 2021-01-03 LAB
ANION GAP SERPL CALCULATED.3IONS-SCNC: 9 MMOL/L (ref 7–16)
APTT: 32.4 SEC (ref 24.5–35.1)
APTT: 35.1 SEC (ref 24.5–35.1)
APTT: 37.9 SEC (ref 24.5–35.1)
APTT: 43.6 SEC (ref 24.5–35.1)
BUN BLDV-MCNC: 15 MG/DL (ref 6–20)
CALCIUM SERPL-MCNC: 9 MG/DL (ref 8.6–10.2)
CHLORIDE BLD-SCNC: 105 MMOL/L (ref 98–107)
CO2: 24 MMOL/L (ref 22–29)
CREAT SERPL-MCNC: 0.9 MG/DL (ref 0.7–1.2)
FIBRINOGEN: 302 MG/DL (ref 225–540)
FIBRINOGEN: 337 MG/DL (ref 225–540)
FIBRINOGEN: 339 MG/DL (ref 225–540)
FIBRINOGEN: 351 MG/DL (ref 225–540)
GFR AFRICAN AMERICAN: >60
GFR NON-AFRICAN AMERICAN: >60 ML/MIN/1.73
GLUCOSE BLD-MCNC: 89 MG/DL (ref 74–99)
HCT VFR BLD CALC: 34.1 % (ref 37–54)
HCT VFR BLD CALC: 35.3 % (ref 37–54)
HCT VFR BLD CALC: 35.4 % (ref 37–54)
HCT VFR BLD CALC: 36.8 % (ref 37–54)
HEMOGLOBIN: 11.2 G/DL (ref 12.5–16.5)
HEMOGLOBIN: 11.2 G/DL (ref 12.5–16.5)
HEMOGLOBIN: 11.4 G/DL (ref 12.5–16.5)
HEMOGLOBIN: 11.5 G/DL (ref 12.5–16.5)
MCH RBC QN AUTO: 33.3 PG (ref 26–35)
MCH RBC QN AUTO: 33.3 PG (ref 26–35)
MCH RBC QN AUTO: 33.8 PG (ref 26–35)
MCH RBC QN AUTO: 34.5 PG (ref 26–35)
MCHC RBC AUTO-ENTMCNC: 31.3 % (ref 32–34.5)
MCHC RBC AUTO-ENTMCNC: 31.7 % (ref 32–34.5)
MCHC RBC AUTO-ENTMCNC: 32.2 % (ref 32–34.5)
MCHC RBC AUTO-ENTMCNC: 32.8 % (ref 32–34.5)
MCV RBC AUTO: 104.9 FL (ref 80–99.9)
MCV RBC AUTO: 105 FL (ref 80–99.9)
MCV RBC AUTO: 105.1 FL (ref 80–99.9)
MCV RBC AUTO: 106.7 FL (ref 80–99.9)
METER GLUCOSE: 121 MG/DL (ref 74–99)
METER GLUCOSE: 76 MG/DL (ref 74–99)
METER GLUCOSE: 86 MG/DL (ref 74–99)
METER GLUCOSE: 93 MG/DL (ref 74–99)
PDW BLD-RTO: 18.2 FL (ref 11.5–15)
PDW BLD-RTO: 18.3 FL (ref 11.5–15)
PDW BLD-RTO: 18.3 FL (ref 11.5–15)
PDW BLD-RTO: 18.5 FL (ref 11.5–15)
PLATELET # BLD: 295 E9/L (ref 130–450)
PLATELET # BLD: 301 E9/L (ref 130–450)
PLATELET # BLD: 308 E9/L (ref 130–450)
PLATELET # BLD: 312 E9/L (ref 130–450)
PMV BLD AUTO: 8.5 FL (ref 7–12)
PMV BLD AUTO: 8.6 FL (ref 7–12)
PMV BLD AUTO: 8.7 FL (ref 7–12)
PMV BLD AUTO: 9.4 FL (ref 7–12)
POTASSIUM SERPL-SCNC: 4.3 MMOL/L (ref 3.5–5)
RBC # BLD: 3.25 E12/L (ref 3.8–5.8)
RBC # BLD: 3.36 E12/L (ref 3.8–5.8)
RBC # BLD: 3.37 E12/L (ref 3.8–5.8)
RBC # BLD: 3.45 E12/L (ref 3.8–5.8)
SODIUM BLD-SCNC: 138 MMOL/L (ref 132–146)
WBC # BLD: 7.5 E9/L (ref 4.5–11.5)
WBC # BLD: 8.5 E9/L (ref 4.5–11.5)
WBC # BLD: 9.1 E9/L (ref 4.5–11.5)
WBC # BLD: 9.4 E9/L (ref 4.5–11.5)

## 2021-01-03 PROCEDURE — 36415 COLL VENOUS BLD VENIPUNCTURE: CPT

## 2021-01-03 PROCEDURE — 2500000003 HC RX 250 WO HCPCS: Performed by: NURSE ANESTHETIST, CERTIFIED REGISTERED

## 2021-01-03 PROCEDURE — C1751 CATH, INF, PER/CENT/MIDLINE: HCPCS | Performed by: SURGERY

## 2021-01-03 PROCEDURE — 85027 COMPLETE CBC AUTOMATED: CPT

## 2021-01-03 PROCEDURE — 3600000012 HC SURGERY LEVEL 2 ADDTL 15MIN: Performed by: SURGERY

## 2021-01-03 PROCEDURE — 6360000002 HC RX W HCPCS: Performed by: SURGERY

## 2021-01-03 PROCEDURE — 3700000000 HC ANESTHESIA ATTENDED CARE: Performed by: SURGERY

## 2021-01-03 PROCEDURE — 2000000000 HC ICU R&B

## 2021-01-03 PROCEDURE — 37213 THROMBLYTIC ART/VEN THERAPY: CPT | Performed by: SURGERY

## 2021-01-03 PROCEDURE — 3700000001 HC ADD 15 MINUTES (ANESTHESIA): Performed by: SURGERY

## 2021-01-03 PROCEDURE — 2580000003 HC RX 258: Performed by: SURGERY

## 2021-01-03 PROCEDURE — 82962 GLUCOSE BLOOD TEST: CPT

## 2021-01-03 PROCEDURE — 2709999900 HC NON-CHARGEABLE SUPPLY: Performed by: SURGERY

## 2021-01-03 PROCEDURE — 3600000002 HC SURGERY LEVEL 2 BASE: Performed by: SURGERY

## 2021-01-03 PROCEDURE — 85384 FIBRINOGEN ACTIVITY: CPT

## 2021-01-03 PROCEDURE — 85730 THROMBOPLASTIN TIME PARTIAL: CPT

## 2021-01-03 PROCEDURE — 6370000000 HC RX 637 (ALT 250 FOR IP): Performed by: SURGERY

## 2021-01-03 PROCEDURE — C1769 GUIDE WIRE: HCPCS | Performed by: SURGERY

## 2021-01-03 PROCEDURE — 2580000003 HC RX 258: Performed by: NURSE ANESTHETIST, CERTIFIED REGISTERED

## 2021-01-03 PROCEDURE — 6360000002 HC RX W HCPCS: Performed by: NURSE ANESTHETIST, CERTIFIED REGISTERED

## 2021-01-03 PROCEDURE — 80048 BASIC METABOLIC PNL TOTAL CA: CPT

## 2021-01-03 RX ORDER — MIDAZOLAM HYDROCHLORIDE 1 MG/ML
INJECTION INTRAMUSCULAR; INTRAVENOUS PRN
Status: DISCONTINUED | OUTPATIENT
Start: 2021-01-03 | End: 2021-01-03 | Stop reason: SDUPTHER

## 2021-01-03 RX ORDER — PROPOFOL 10 MG/ML
INJECTION, EMULSION INTRAVENOUS CONTINUOUS PRN
Status: DISCONTINUED | OUTPATIENT
Start: 2021-01-03 | End: 2021-01-03 | Stop reason: SDUPTHER

## 2021-01-03 RX ORDER — KETAMINE HCL IN NACL, ISO-OSM 100MG/10ML
SYRINGE (ML) INJECTION PRN
Status: DISCONTINUED | OUTPATIENT
Start: 2021-01-03 | End: 2021-01-03 | Stop reason: SDUPTHER

## 2021-01-03 RX ORDER — SODIUM CHLORIDE, SODIUM GLUCONATE, SODIUM ACETATE, POTASSIUM CHLORIDE AND MAGNESIUM CHLORIDE 526; 502; 368; 37; 30 MG/100ML; MG/100ML; MG/100ML; MG/100ML; MG/100ML
INJECTION, SOLUTION INTRAVENOUS CONTINUOUS PRN
Status: DISCONTINUED | OUTPATIENT
Start: 2021-01-03 | End: 2021-01-03 | Stop reason: SDUPTHER

## 2021-01-03 RX ADMIN — MORPHINE SULFATE 4 MG: 4 INJECTION, SOLUTION INTRAMUSCULAR; INTRAVENOUS at 18:40

## 2021-01-03 RX ADMIN — OXYCODONE AND ACETAMINOPHEN 1 TABLET: 5; 325 TABLET ORAL at 21:42

## 2021-01-03 RX ADMIN — METOPROLOL SUCCINATE 25 MG: 25 TABLET, EXTENDED RELEASE ORAL at 08:52

## 2021-01-03 RX ADMIN — CEFAZOLIN 3 G: 10 INJECTION, POWDER, FOR SOLUTION INTRAVENOUS at 17:34

## 2021-01-03 RX ADMIN — INSULIN GLARGINE 25 UNITS: 100 INJECTION, SOLUTION SUBCUTANEOUS at 20:45

## 2021-01-03 RX ADMIN — OXYCODONE AND ACETAMINOPHEN 1 TABLET: 5; 325 TABLET ORAL at 05:28

## 2021-01-03 RX ADMIN — SODIUM CHLORIDE, PRESERVATIVE FREE 10 ML: 5 INJECTION INTRAVENOUS at 07:53

## 2021-01-03 RX ADMIN — SODIUM CHLORIDE: 9 INJECTION, SOLUTION INTRAVENOUS at 12:34

## 2021-01-03 RX ADMIN — CEFAZOLIN 3 G: 10 INJECTION, POWDER, FOR SOLUTION INTRAVENOUS at 11:04

## 2021-01-03 RX ADMIN — MORPHINE SULFATE 4 MG: 4 INJECTION, SOLUTION INTRAMUSCULAR; INTRAVENOUS at 09:54

## 2021-01-03 RX ADMIN — FENOFIBRATE 54 MG: 54 TABLET ORAL at 08:51

## 2021-01-03 RX ADMIN — OXYCODONE AND ACETAMINOPHEN 1 TABLET: 5; 325 TABLET ORAL at 01:12

## 2021-01-03 RX ADMIN — Medication 30 MG: at 14:14

## 2021-01-03 RX ADMIN — LISINOPRIL 5 MG: 10 TABLET ORAL at 08:51

## 2021-01-03 RX ADMIN — Medication 20 MG: at 14:31

## 2021-01-03 RX ADMIN — MORPHINE SULFATE 4 MG: 4 INJECTION, SOLUTION INTRAMUSCULAR; INTRAVENOUS at 00:04

## 2021-01-03 RX ADMIN — SODIUM CHLORIDE, PRESERVATIVE FREE 10 ML: 5 INJECTION INTRAVENOUS at 16:26

## 2021-01-03 RX ADMIN — POTASSIUM CHLORIDE 20 MEQ: 1500 TABLET, EXTENDED RELEASE ORAL at 16:28

## 2021-01-03 RX ADMIN — MORPHINE SULFATE 4 MG: 4 INJECTION, SOLUTION INTRAMUSCULAR; INTRAVENOUS at 22:50

## 2021-01-03 RX ADMIN — CYCLOBENZAPRINE 10 MG: 10 TABLET, FILM COATED ORAL at 20:31

## 2021-01-03 RX ADMIN — PROPOFOL 50 MCG/KG/MIN: 10 INJECTION, EMULSION INTRAVENOUS at 14:17

## 2021-01-03 RX ADMIN — HYDROXYUREA 500 MG: 500 CAPSULE ORAL at 08:51

## 2021-01-03 RX ADMIN — FERROUS SULFATE TAB 325 MG (65 MG ELEMENTAL FE) 325 MG: 325 (65 FE) TAB at 08:52

## 2021-01-03 RX ADMIN — FERROUS SULFATE TAB 325 MG (65 MG ELEMENTAL FE) 325 MG: 325 (65 FE) TAB at 20:31

## 2021-01-03 RX ADMIN — HYDROXYUREA 500 MG: 500 CAPSULE ORAL at 20:31

## 2021-01-03 RX ADMIN — MORPHINE SULFATE 4 MG: 4 INJECTION, SOLUTION INTRAMUSCULAR; INTRAVENOUS at 20:42

## 2021-01-03 RX ADMIN — Medication 10 ML: at 20:46

## 2021-01-03 RX ADMIN — OXYCODONE AND ACETAMINOPHEN 1 TABLET: 5; 325 TABLET ORAL at 09:28

## 2021-01-03 RX ADMIN — ALTEPLASE 2 MG/HR: 2.2 INJECTION, POWDER, LYOPHILIZED, FOR SOLUTION INTRAVENOUS at 18:58

## 2021-01-03 RX ADMIN — DULOXETINE HYDROCHLORIDE 30 MG: 30 CAPSULE, DELAYED RELEASE ORAL at 08:51

## 2021-01-03 RX ADMIN — CYCLOBENZAPRINE 10 MG: 10 TABLET, FILM COATED ORAL at 13:41

## 2021-01-03 RX ADMIN — PRAVASTATIN SODIUM 20 MG: 20 TABLET ORAL at 20:31

## 2021-01-03 RX ADMIN — PREGABALIN 300 MG: 100 CAPSULE ORAL at 08:51

## 2021-01-03 RX ADMIN — MORPHINE SULFATE 4 MG: 4 INJECTION, SOLUTION INTRAMUSCULAR; INTRAVENOUS at 02:06

## 2021-01-03 RX ADMIN — OXYCODONE AND ACETAMINOPHEN 1 TABLET: 5; 325 TABLET ORAL at 17:44

## 2021-01-03 RX ADMIN — MORPHINE SULFATE 4 MG: 4 INJECTION, SOLUTION INTRAMUSCULAR; INTRAVENOUS at 04:03

## 2021-01-03 RX ADMIN — MORPHINE SULFATE 4 MG: 4 INJECTION, SOLUTION INTRAMUSCULAR; INTRAVENOUS at 12:11

## 2021-01-03 RX ADMIN — MORPHINE SULFATE 4 MG: 4 INJECTION, SOLUTION INTRAMUSCULAR; INTRAVENOUS at 07:51

## 2021-01-03 RX ADMIN — ALTEPLASE 1 MG/HR: 2.2 INJECTION, POWDER, LYOPHILIZED, FOR SOLUTION INTRAVENOUS at 11:02

## 2021-01-03 RX ADMIN — CEFAZOLIN 3 G: 10 INJECTION, POWDER, FOR SOLUTION INTRAVENOUS at 04:03

## 2021-01-03 RX ADMIN — Medication 10 ML: at 08:57

## 2021-01-03 RX ADMIN — PREGABALIN 300 MG: 100 CAPSULE ORAL at 20:35

## 2021-01-03 RX ADMIN — MIDAZOLAM 2 MG: 1 INJECTION INTRAMUSCULAR; INTRAVENOUS at 14:12

## 2021-01-03 RX ADMIN — CYCLOBENZAPRINE 10 MG: 10 TABLET, FILM COATED ORAL at 07:57

## 2021-01-03 RX ADMIN — SODIUM CHLORIDE, SODIUM GLUCONATE, SODIUM ACETATE, POTASSIUM CHLORIDE AND MAGNESIUM CHLORIDE: 526; 502; 368; 37; 30 INJECTION, SOLUTION INTRAVENOUS at 14:12

## 2021-01-03 RX ADMIN — FLUTICASONE PROPIONATE 2 SPRAY: 50 SPRAY, METERED NASAL at 08:53

## 2021-01-03 RX ADMIN — OXYCODONE AND ACETAMINOPHEN 1 TABLET: 5; 325 TABLET ORAL at 13:26

## 2021-01-03 ASSESSMENT — PAIN DESCRIPTION - DESCRIPTORS
DESCRIPTORS: DISCOMFORT
DESCRIPTORS: DISCOMFORT
DESCRIPTORS: ACHING;DISCOMFORT
DESCRIPTORS: ACHING;BURNING
DESCRIPTORS: ACHING;DISCOMFORT

## 2021-01-03 ASSESSMENT — PULMONARY FUNCTION TESTS
PIF_VALUE: 1

## 2021-01-03 ASSESSMENT — PAIN DESCRIPTION - ORIENTATION
ORIENTATION: MID;LOWER
ORIENTATION: MID;LOWER
ORIENTATION: LEFT
ORIENTATION: LEFT
ORIENTATION: MID;LOWER

## 2021-01-03 ASSESSMENT — PAIN SCALES - GENERAL
PAINLEVEL_OUTOF10: 6
PAINLEVEL_OUTOF10: 0
PAINLEVEL_OUTOF10: 8
PAINLEVEL_OUTOF10: 0
PAINLEVEL_OUTOF10: 6
PAINLEVEL_OUTOF10: 8
PAINLEVEL_OUTOF10: 6
PAINLEVEL_OUTOF10: 9
PAINLEVEL_OUTOF10: 10
PAINLEVEL_OUTOF10: 6
PAINLEVEL_OUTOF10: 9

## 2021-01-03 ASSESSMENT — PAIN DESCRIPTION - LOCATION
LOCATION: BACK
LOCATION: LEG
LOCATION: LEG
LOCATION: BACK
LOCATION: BACK;LEG
LOCATION: LEG
LOCATION: BACK;SHOULDER
LOCATION: BACK;LEG

## 2021-01-03 ASSESSMENT — PAIN DESCRIPTION - ONSET: ONSET: ON-GOING

## 2021-01-03 ASSESSMENT — PAIN DESCRIPTION - PAIN TYPE
TYPE: ACUTE PAIN;CHRONIC PAIN
TYPE: ACUTE PAIN;SURGICAL PAIN

## 2021-01-03 NOTE — FLOWSHEET NOTE
Returned to [phone] from venogram. EKOS to L popliteal region, drsg   D/I, yellow light blinking, foot warm, DP 1+, PT w doppler.

## 2021-01-03 NOTE — PROGRESS NOTES
Vascular Surgery Progress Note    Pt is being seen in f/u today regarding L LE DVT, IVC [id number]  Subjective  Pt s/e. Swelling and pain is better but still present. Denies sob or chest pain.   Pain better  Current Medications:    dextrose      alteplase (ACTIVASE) 10 mg in 0.9% sodium chloride infusion 100 mL 1 mg/hr (01/03/21 1102)    heparin (PORCINE) Infusion 500 Units/hr (01/02/21 0358)    sodium chloride 35 mL/hr at 01/01/21 1400      morphine, oxyCODONE-acetaminophen, glucose, dextrose, glucagon (rDNA), dextrose, loperamide, melatonin, sodium chloride flush, acetaminophen, ondansetron    insulin lispro  0-10 Units Subcutaneous 4x Daily AC & HS    cholestyramine  1 packet Oral BID    cyclobenzaprine  10 mg Oral TID    DULoxetine  30 mg Oral Daily    fenofibrate  54 mg Oral Daily    ferrous sulfate  325 mg Oral BID    fluticasone  2 spray Each Nostril Daily    hydroxyurea  500 mg Oral BID    insulin glargine  25 Units Subcutaneous Daily    insulin lispro  3 Units Subcutaneous TID AC    insulin lispro  0-3 Units Subcutaneous Nightly    lisinopril  5 mg Oral Daily    metoprolol succinate  25 mg Oral Daily    pantoprazole  40 mg Oral Daily    potassium chloride  20 mEq Oral BID WC    pravastatin  20 mg Oral Nightly    pregabalin  300 mg Oral BID    sodium chloride flush  10 mL Intravenous 2 times per day    ceFAZolin (ANCEF) IVPB  3 g Intravenous Q8H      PHYSICAL EXAM:    BP 95/65   Pulse 81   Temp 97.7 °F (36.5 °C) (Temporal)   Resp 10   Wt 290 lb 2 oz (131.6 kg)   SpO2 94%   BMI 38.28 kg/m²     Intake/Output Summary (Last 24 hours) at 1/3/2021 1217  Last data filed at 1/3/2021 1104  Gross per 24 hour   Intake 1880 ml   Output 1850 ml   Net 30 ml        Gen awake and alert  CVS S1S2  Resp good resp excursion  Abd soft nt nd  L LE + edema and swelling better but still significant in thigh more than calf    Lysis catheter in place with sheath, no hemoatoma appreciated  LABS:    Lab

## 2021-01-03 NOTE — PROGRESS NOTES
Temperature alarm flashing on EKOS at 0050, called the 24H clinical support line [phone] immediately and spoke with representative to troubleshoot, I was instructed to and executed rebooting the machine, checking wire and cord intactness, flushed the coolant port with 10mL of normal saline, and Dr. Nola Wolf himself pulled the wire out slightly to attempt to reposition wire. No success in alarm cease. EKOS representative and Dr. Nola Wolf agreed the only option was to turn the EKOS ultrasound off and use line as an infusion catheter until return to OR tomorrow. To note, EKOS was rebooted at 12hours and 52minutes. Representative requested the EKOS wire be saved from OR tomorrow for local representative to  and replace. Patient is alert and well, in no distress, all vitals stable, and updated on plan of care.

## 2021-01-03 NOTE — OP NOTE
Cardiovascular Lab Procedure Report    Heather Angulo  1975    Date : 1/3/2021  Surgeon: Wing Radha M.D. Pre-procedure Diagnosis: Left lower extremity DVT, IVC thrombosis  Post-procedure Diagnosis: Same  Procedure:    59215 FU  Venogram of IVC and Left lower extremity with existing lysis catheter    Anesthesia: Local with IV sedation  Assistants: Cath Lab Staff  Estimated Blood Loss: Minimal  Complications: none  Findings:    Left    Inferior Vena Cava Significant amt of residual thrombus   Common Iliac Vein Thrombus less   External Iliac Vein No significant thrombus   Common Femoral Vein Minimal residual thrombus   Superficial Femoral Vein patent     Procedure Details :  Timeout preformed identifying pt and procedure. Left popliteal region prepped and draped in sterile fashion. Patient given sedation as needed throughout the case. Preexisting 6 fr sheath with EKOS lsysis catheter was already in place in the Left popliteal vein. Venogram preformed of  Left lower extremity and inferior vena cava. New ekos catheter placed over the replaced above the level of the filter after venogram confirmed location. Previous catheter had issues with overheating and was turned off last night.     Plan  Increase tpa dose to 2 mg / hr  Will take back to angio tomorrow  Serial labs  Neuro exams    Wing Radha

## 2021-01-03 NOTE — PROGRESS NOTES
Prophylaxis: heparin infusion    Medications:  REVIEWED DAILY    Infusion Medications    dextrose      alteplase (ACTIVASE) 10 mg in 0.9% sodium chloride infusion 100 mL 1 mg/hr (01/02/21 0114)    heparin (PORCINE) Infusion 500 Units/hr (01/02/21 0358)    sodium chloride 35 mL/hr at 01/01/21 1400     Scheduled Medications    insulin lispro  0-10 Units Subcutaneous 4x Daily AC & HS    cholestyramine  1 packet Oral BID    cyclobenzaprine  10 mg Oral TID    DULoxetine  30 mg Oral Daily    fenofibrate  54 mg Oral Daily    ferrous sulfate  325 mg Oral BID    fluticasone  2 spray Each Nostril Daily    hydroxyurea  500 mg Oral BID    insulin glargine  25 Units Subcutaneous Daily    insulin lispro  3 Units Subcutaneous TID AC    insulin lispro  0-3 Units Subcutaneous Nightly    lisinopril  5 mg Oral Daily    metoprolol succinate  25 mg Oral Daily    pantoprazole  40 mg Oral Daily    potassium chloride  20 mEq Oral BID WC    pravastatin  20 mg Oral Nightly    pregabalin  300 mg Oral BID    sodium chloride flush  10 mL Intravenous 2 times per day    ceFAZolin (ANCEF) IVPB  3 g Intravenous Q8H     PRN Meds: morphine, oxyCODONE-acetaminophen, glucose, dextrose, glucagon (rDNA), dextrose, loperamide, melatonin, sodium chloride flush, acetaminophen, ondansetron    Labs:     Recent Labs     01/02/21  1635 01/02/21  2230 01/03/21  0405   WBC 9.3 9.0 9.1   HGB 11.9* 11.2* 11.2*   HCT 37.9 34.9* 35.3*    318 312       Recent Labs     01/01/21  0530 01/02/21  0400 01/03/21  0405    136 138   K 4.6 4.1 4.3    102 105   CO2 21* 26 24   BUN 10 13 15   CREATININE 0.8 0.9 0.9   CALCIUM 9.7 9.1 9.0       No results for input(s): PROT, ALB, ALKPHOS, ALT, AST, BILITOT, AMYLASE, LIPASE in the last 72 hours. No results for input(s): INR in the last 72 hours. No results for input(s): Marquita Baseman in the last 72 hours.     Chronic labs:    Lab Results   Component Value Date    CHOL 111 11/19/2020    TRIG 148 11/19/2020    HDL 34 11/19/2020    LDLCALC 47 11/19/2020    TSH 0.833 09/18/2020    INR 1.1 11/18/2020    LABA1C 5.1 11/19/2020       Radiology: REVIEWED DAILY    +++++++++++++++++++++++++++++++++++++++++++++++++  [id number] [facility]  +++++++++++++++++++++++++++++++++++++++++++++++++

## 2021-01-04 LAB
ANION GAP SERPL CALCULATED.3IONS-SCNC: 11 MMOL/L (ref 7–16)
APTT: 33.5 SEC (ref 24.5–35.1)
APTT: 34.2 SEC (ref 24.5–35.1)
BUN BLDV-MCNC: 15 MG/DL (ref 6–20)
CALCIUM SERPL-MCNC: 9.1 MG/DL (ref 8.6–10.2)
CHLORIDE BLD-SCNC: 102 MMOL/L (ref 98–107)
CO2: 25 MMOL/L (ref 22–29)
CREAT SERPL-MCNC: 1 MG/DL (ref 0.7–1.2)
FIBRINOGEN: 332 MG/DL (ref 225–540)
FIBRINOGEN: 350 MG/DL (ref 225–540)
GFR AFRICAN AMERICAN: >60
GFR NON-AFRICAN AMERICAN: >60 ML/MIN/1.73
GLUCOSE BLD-MCNC: 74 MG/DL (ref 74–99)
HCT VFR BLD CALC: 34.6 % (ref 37–54)
HCT VFR BLD CALC: 34.7 % (ref 37–54)
HCT VFR BLD CALC: 35.7 % (ref 37–54)
HEMOGLOBIN: 11 G/DL (ref 12.5–16.5)
HEMOGLOBIN: 11 G/DL (ref 12.5–16.5)
HEMOGLOBIN: 11.5 G/DL (ref 12.5–16.5)
INR BLD: 1.1
MCH RBC QN AUTO: 33.2 PG (ref 26–35)
MCH RBC QN AUTO: 33.4 PG (ref 26–35)
MCH RBC QN AUTO: 33.9 PG (ref 26–35)
MCHC RBC AUTO-ENTMCNC: 31.7 % (ref 32–34.5)
MCHC RBC AUTO-ENTMCNC: 31.8 % (ref 32–34.5)
MCHC RBC AUTO-ENTMCNC: 32.2 % (ref 32–34.5)
MCV RBC AUTO: 104.8 FL (ref 80–99.9)
MCV RBC AUTO: 105.2 FL (ref 80–99.9)
MCV RBC AUTO: 105.3 FL (ref 80–99.9)
METER GLUCOSE: 122 MG/DL (ref 74–99)
METER GLUCOSE: 131 MG/DL (ref 74–99)
METER GLUCOSE: 78 MG/DL (ref 74–99)
METER GLUCOSE: <40 MG/DL (ref 74–99)
PDW BLD-RTO: 18 FL (ref 11.5–15)
PDW BLD-RTO: 18.1 FL (ref 11.5–15)
PDW BLD-RTO: 18.4 FL (ref 11.5–15)
PLATELET # BLD: 295 E9/L (ref 130–450)
PLATELET # BLD: 315 E9/L (ref 130–450)
PLATELET # BLD: 331 E9/L (ref 130–450)
PMV BLD AUTO: 9.1 FL (ref 7–12)
PMV BLD AUTO: 9.1 FL (ref 7–12)
PMV BLD AUTO: 9.7 FL (ref 7–12)
POTASSIUM SERPL-SCNC: 4.7 MMOL/L (ref 3.5–5)
PROTHROMBIN TIME: 12.2 SEC (ref 9.3–12.4)
RBC # BLD: 3.29 E12/L (ref 3.8–5.8)
RBC # BLD: 3.31 E12/L (ref 3.8–5.8)
RBC # BLD: 3.39 E12/L (ref 3.8–5.8)
SODIUM BLD-SCNC: 138 MMOL/L (ref 132–146)
WBC # BLD: 8.2 E9/L (ref 4.5–11.5)
WBC # BLD: 8.4 E9/L (ref 4.5–11.5)
WBC # BLD: 9.9 E9/L (ref 4.5–11.5)

## 2021-01-04 PROCEDURE — 85027 COMPLETE CBC AUTOMATED: CPT

## 2021-01-04 PROCEDURE — 6370000000 HC RX 637 (ALT 250 FOR IP): Performed by: SURGERY

## 2021-01-04 PROCEDURE — 6370000000 HC RX 637 (ALT 250 FOR IP): Performed by: NURSE PRACTITIONER

## 2021-01-04 PROCEDURE — 6360000002 HC RX W HCPCS: Performed by: SURGERY

## 2021-01-04 PROCEDURE — 2580000003 HC RX 258: Performed by: SURGERY

## 2021-01-04 PROCEDURE — 2580000003 HC RX 258: Performed by: NURSE PRACTITIONER

## 2021-01-04 PROCEDURE — 85384 FIBRINOGEN ACTIVITY: CPT

## 2021-01-04 PROCEDURE — 6360000002 HC RX W HCPCS: Performed by: NURSE PRACTITIONER

## 2021-01-04 PROCEDURE — 36415 COLL VENOUS BLD VENIPUNCTURE: CPT

## 2021-01-04 PROCEDURE — 2709999900 HC NON-CHARGEABLE SUPPLY

## 2021-01-04 PROCEDURE — 2500000003 HC RX 250 WO HCPCS

## 2021-01-04 PROCEDURE — 37214 CESSJ THERAPY CATH REMOVAL: CPT | Performed by: SURGERY

## 2021-01-04 PROCEDURE — 85730 THROMBOPLASTIN TIME PARTIAL: CPT

## 2021-01-04 PROCEDURE — 85610 PROTHROMBIN TIME: CPT

## 2021-01-04 PROCEDURE — 80048 BASIC METABOLIC PNL TOTAL CA: CPT

## 2021-01-04 PROCEDURE — 82962 GLUCOSE BLOOD TEST: CPT

## 2021-01-04 PROCEDURE — 2140000000 HC CCU INTERMEDIATE R&B

## 2021-01-04 RX ORDER — WARFARIN SODIUM 7.5 MG/1
7.5 TABLET ORAL
Status: COMPLETED | OUTPATIENT
Start: 2021-01-04 | End: 2021-01-04

## 2021-01-04 RX ADMIN — ALTEPLASE 2 MG/HR: 2.2 INJECTION, POWDER, LYOPHILIZED, FOR SOLUTION INTRAVENOUS at 06:10

## 2021-01-04 RX ADMIN — HYDROXYUREA 500 MG: 500 CAPSULE ORAL at 22:23

## 2021-01-04 RX ADMIN — MORPHINE SULFATE 4 MG: 4 INJECTION, SOLUTION INTRAMUSCULAR; INTRAVENOUS at 16:17

## 2021-01-04 RX ADMIN — CEFAZOLIN 3 G: 10 INJECTION, POWDER, FOR SOLUTION INTRAVENOUS at 02:42

## 2021-01-04 RX ADMIN — PREGABALIN 300 MG: 100 CAPSULE ORAL at 08:15

## 2021-01-04 RX ADMIN — PREGABALIN 300 MG: 100 CAPSULE ORAL at 22:23

## 2021-01-04 RX ADMIN — CYCLOBENZAPRINE 10 MG: 10 TABLET, FILM COATED ORAL at 14:11

## 2021-01-04 RX ADMIN — Medication 3 MG: at 22:23

## 2021-01-04 RX ADMIN — MORPHINE SULFATE 4 MG: 4 INJECTION, SOLUTION INTRAMUSCULAR; INTRAVENOUS at 08:37

## 2021-01-04 RX ADMIN — CYCLOBENZAPRINE 10 MG: 10 TABLET, FILM COATED ORAL at 22:35

## 2021-01-04 RX ADMIN — POTASSIUM CHLORIDE 20 MEQ: 1500 TABLET, EXTENDED RELEASE ORAL at 08:15

## 2021-01-04 RX ADMIN — MORPHINE SULFATE 4 MG: 4 INJECTION, SOLUTION INTRAMUSCULAR; INTRAVENOUS at 02:43

## 2021-01-04 RX ADMIN — ENOXAPARIN SODIUM 135 MG: 150 INJECTION SUBCUTANEOUS at 17:48

## 2021-01-04 RX ADMIN — CEFAZOLIN 3 G: 10 INJECTION, POWDER, FOR SOLUTION INTRAVENOUS at 10:30

## 2021-01-04 RX ADMIN — MORPHINE SULFATE 4 MG: 4 INJECTION, SOLUTION INTRAMUSCULAR; INTRAVENOUS at 04:43

## 2021-01-04 RX ADMIN — ALTEPLASE 2 MG/HR: 2.2 INJECTION, POWDER, LYOPHILIZED, FOR SOLUTION INTRAVENOUS at 00:30

## 2021-01-04 RX ADMIN — MORPHINE SULFATE 4 MG: 4 INJECTION, SOLUTION INTRAMUSCULAR; INTRAVENOUS at 00:43

## 2021-01-04 RX ADMIN — FERROUS SULFATE TAB 325 MG (65 MG ELEMENTAL FE) 325 MG: 325 (65 FE) TAB at 22:23

## 2021-01-04 RX ADMIN — PRAVASTATIN SODIUM 20 MG: 20 TABLET ORAL at 22:35

## 2021-01-04 RX ADMIN — WARFARIN SODIUM 7.5 MG: 7.5 TABLET ORAL at 17:48

## 2021-01-04 RX ADMIN — OXYCODONE AND ACETAMINOPHEN 1 TABLET: 5; 325 TABLET ORAL at 01:41

## 2021-01-04 RX ADMIN — MORPHINE SULFATE 4 MG: 4 INJECTION, SOLUTION INTRAMUSCULAR; INTRAVENOUS at 18:54

## 2021-01-04 RX ADMIN — CHOLESTYRAMINE 4 G: 4 POWDER, FOR SUSPENSION ORAL at 22:22

## 2021-01-04 RX ADMIN — OXYCODONE AND ACETAMINOPHEN 1 TABLET: 5; 325 TABLET ORAL at 09:41

## 2021-01-04 RX ADMIN — OXYCODONE AND ACETAMINOPHEN 1 TABLET: 5; 325 TABLET ORAL at 15:06

## 2021-01-04 RX ADMIN — HEPARIN SODIUM 500 UNITS/HR: 10000 INJECTION, SOLUTION INTRAVENOUS at 00:30

## 2021-01-04 RX ADMIN — LISINOPRIL 5 MG: 10 TABLET ORAL at 08:14

## 2021-01-04 RX ADMIN — FLUTICASONE PROPIONATE 2 SPRAY: 50 SPRAY, METERED NASAL at 09:10

## 2021-01-04 RX ADMIN — Medication 10 ML: at 08:15

## 2021-01-04 RX ADMIN — POTASSIUM CHLORIDE 20 MEQ: 1500 TABLET, EXTENDED RELEASE ORAL at 16:13

## 2021-01-04 RX ADMIN — OXYCODONE AND ACETAMINOPHEN 1 TABLET: 5; 325 TABLET ORAL at 05:43

## 2021-01-04 RX ADMIN — CEFAZOLIN 3 G: 10 INJECTION, POWDER, FOR SOLUTION INTRAVENOUS at 17:54

## 2021-01-04 RX ADMIN — ALTEPLASE 2 MG/HR: 2.2 INJECTION, POWDER, LYOPHILIZED, FOR SOLUTION INTRAVENOUS at 12:19

## 2021-01-04 RX ADMIN — MORPHINE SULFATE 4 MG: 4 INJECTION, SOLUTION INTRAMUSCULAR; INTRAVENOUS at 22:23

## 2021-01-04 RX ADMIN — FENOFIBRATE 54 MG: 54 TABLET ORAL at 08:14

## 2021-01-04 RX ADMIN — DULOXETINE HYDROCHLORIDE 30 MG: 30 CAPSULE, DELAYED RELEASE ORAL at 08:15

## 2021-01-04 RX ADMIN — HYDROXYUREA 500 MG: 500 CAPSULE ORAL at 08:15

## 2021-01-04 RX ADMIN — FERROUS SULFATE TAB 325 MG (65 MG ELEMENTAL FE) 325 MG: 325 (65 FE) TAB at 08:14

## 2021-01-04 RX ADMIN — OXYCODONE AND ACETAMINOPHEN 1 TABLET: 5; 325 TABLET ORAL at 20:15

## 2021-01-04 RX ADMIN — CYCLOBENZAPRINE 10 MG: 10 TABLET, FILM COATED ORAL at 08:15

## 2021-01-04 RX ADMIN — Medication 10 ML: at 22:23

## 2021-01-04 RX ADMIN — METOPROLOL SUCCINATE 25 MG: 25 TABLET, EXTENDED RELEASE ORAL at 08:15

## 2021-01-04 ASSESSMENT — PAIN SCALES - GENERAL
PAINLEVEL_OUTOF10: 8
PAINLEVEL_OUTOF10: 7
PAINLEVEL_OUTOF10: 10
PAINLEVEL_OUTOF10: 8
PAINLEVEL_OUTOF10: 9
PAINLEVEL_OUTOF10: 8
PAINLEVEL_OUTOF10: 10
PAINLEVEL_OUTOF10: 9
PAINLEVEL_OUTOF10: 10
PAINLEVEL_OUTOF10: 10

## 2021-01-04 ASSESSMENT — PAIN DESCRIPTION - LOCATION
LOCATION: BACK;LEG
LOCATION: BACK

## 2021-01-04 ASSESSMENT — PAIN - FUNCTIONAL ASSESSMENT: PAIN_FUNCTIONAL_ASSESSMENT: ACTIVITIES ARE NOT PREVENTED

## 2021-01-04 ASSESSMENT — PAIN DESCRIPTION - PAIN TYPE
TYPE: ACUTE PAIN;CHRONIC PAIN
TYPE: CHRONIC PAIN
TYPE: CHRONIC PAIN
TYPE: ACUTE PAIN;CHRONIC PAIN
TYPE: ACUTE PAIN;CHRONIC PAIN

## 2021-01-04 ASSESSMENT — PAIN DESCRIPTION - FREQUENCY: FREQUENCY: CONTINUOUS

## 2021-01-04 ASSESSMENT — PAIN DESCRIPTION - DESCRIPTORS
DESCRIPTORS: DISCOMFORT
DESCRIPTORS: ACHING;DISCOMFORT

## 2021-01-04 ASSESSMENT — PAIN DESCRIPTION - PROGRESSION: CLINICAL_PROGRESSION: NOT CHANGED

## 2021-01-04 NOTE — PROGRESS NOTES
following, on hydroxyurea  Suspect sleep apnea- patient admits to prior history of GRECIA but states it resolved when he lost weight      DISPOSITION: facility  Diet: NPO  PT/OT: when ok with vascular  Code Status: Full code  GI Prophylaxis: Protonix  DVT Prophylaxis: heparin infusion    Medications:  REVIEWED DAILY    Infusion Medications    dextrose      sodium chloride Stopped (01/04/21 1250)     Scheduled Medications    enoxaparin  1 mg/kg Subcutaneous BID    insulin lispro  0-10 Units Subcutaneous 4x Daily AC & HS    cholestyramine  1 packet Oral BID    cyclobenzaprine  10 mg Oral TID    DULoxetine  30 mg Oral Daily    fenofibrate  54 mg Oral Daily    ferrous sulfate  325 mg Oral BID    fluticasone  2 spray Each Nostril Daily    hydroxyurea  500 mg Oral BID    [Held by provider] insulin glargine  25 Units Subcutaneous Daily    insulin lispro  3 Units Subcutaneous TID AC    insulin lispro  0-3 Units Subcutaneous Nightly    lisinopril  5 mg Oral Daily    metoprolol succinate  25 mg Oral Daily    pantoprazole  40 mg Oral Daily    potassium chloride  20 mEq Oral BID WC    pravastatin  20 mg Oral Nightly    pregabalin  300 mg Oral BID    sodium chloride flush  10 mL Intravenous 2 times per day    ceFAZolin (ANCEF) IVPB  3 g Intravenous Q8H     PRN Meds: morphine, oxyCODONE-acetaminophen, glucose, dextrose, glucagon (rDNA), dextrose, loperamide, melatonin, sodium chloride flush, acetaminophen, ondansetron    Labs:     Recent Labs     01/03/21  2311 01/04/21  0445 01/04/21  1024   WBC 9.4 8.4 9.9   HGB 11.2* 11.0* 11.0*   HCT 34.1* 34.6* 34.7*    331 315       Recent Labs     01/02/21  0400 01/03/21  0405 01/04/21  0445    138 138   K 4.1 4.3 4.7    105 102   CO2 26 24 25   BUN 13 15 15   CREATININE 0.9 0.9 1.0   CALCIUM 9.1 9.0 9.1       No results for input(s): PROT, ALB, ALKPHOS, ALT, AST, BILITOT, AMYLASE, LIPASE in the last 72 hours.     No results for input(s): INR in the

## 2021-01-04 NOTE — PROGRESS NOTES
[facility]  Hematology/Oncology  Consult      Patient Name: Dina Guerrier II  YOB: 1975  PCP: IGOR Figueroa - CNP   Referring Provider:      Reason for Consultation:   Chief Complaint   Patient presents with    Leg Pain     pt with increased swelling and pain and redness in BLE x1 day; hx: DVTs and PEs, IVC filter placed x 1-2 months ago      Subjective: Feeling better today. Glad to have catheter out and eager to be discharged when the time comes. History of Present Illness: This is a 77-year-old male patient of Dr. Clara Keith who is being seen for thrombocytosis on Hydrea 500 mg twice daily as well as an aspirin 81 mg, moderate normocytic anemia, and chronic BL LE DVT on Eliquis and IVC filter. 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 He was then hospitalized in October for an extensive left leg DVT that required EKOS and thrombolytics. He was also COVID-19 + at that time. He was again hospitalized in November for lower back pain were he was found to have a DVT in his RLE as well. Patient presented to emergency room for current admission for bilateral leg swelling. CT A/P showed  Infrarenal IVC filter in place. Interval placement of left common iliac and external iliac vein stent. There is low-attenuation within the IVC below the stent as well as within the left common iliac vein stent relative to the IVC above the stent. Findings are likely related to thrombus. Fluid-filled loops of small bowel measure up to 2.6 cm in diameter suggesting partial small bowel obstruction or ileus. Angio performed 12/31 s/p catheter directed lysis with tPA/heparin infusing via EKOS catheter.  CBC was stable on admission platelets well controlled at 563. Consultation evaluation and recommendations acute DVT of LLE and for failure of eliquis.         Diagnostic Data:     Past Medical History:   Diagnosis Date    Accident 11/2019    stepped on nail rt ft about 1 month ago- healed per patient    Acute thrombosis of inferior vena cava ([address] 10/10/2020    SIMÓN (acute kidney injury) ([address] 2008 apx    kidney bruised due to fall / [place] [address]    Allergic rhinitis     Chronic back pain     Depression     Diabetes mellitus ([address]     Difficulty sleeping     at times    Displacement of lumbar intervertebral disc without myelopathy     Fibromyalgia     Fractured rib     2008 / healed    H/O seasonal allergies     Head injury 1980'S apx    no residual s/s    Hyperlipidemia     Hypertension     Obesity (BMI 35.0-39.9 without comorbidity)     bmi 39.2  weight 296 #    Osteoarthritis     Thoracic or lumbosacral neuritis or radiculitis, unspecified        Patient Active Problem List    Diagnosis Date Noted    DVT, lower extremity, recurrent, bilateral ([address] 12/31/2020    Deep vein thrombosis (DVT) of both lower extremities ([address] 11/19/2020    Ground glass opacity present on imaging of lung 11/19/2020    Tobacco dependence 11/19/2020    Anticoagulated 11/19/2020    Back pain 11/19/2020    Cellulitis 11/18/2020    Acute thrombosis of inferior vena cava ([address] 10/10/2020    COVID-19 virus infection 10/09/2020    Acute deep vein thrombosis (DVT) ([address] 10/07/2020    SIMÓN (acute kidney injury) ([address] 09/17/2020    Septicemia ([address] 09/17/2020    Intra-abdominal infection 09/17/2020    Acute GI bleeding 09/03/2020    Acute blood loss anemia     Skin wound from surgical incision 08/13/2020    Thrombocytosis ([address] 08/10/2020    Decubitus ulcer of sacral area 08/01/2020    Abnormal findings on diagnostic imaging of gall bladder 08/01/2020    Splenic infarction 08/01/2020    Cyst of spleen 08/01/2020    Splenic abscess 08/01/2020    Anemia 08/01/2020    Other pulmonary embolism without acute cor pulmonale (HCC) 08/01/2020    Enterocolitis 07/31/2020    Rectus diastasis 11/01/2019    Recurrent unilateral inguinal hernia 11/01/2019    Cellulitis of sacral region 07/02/2019    Cellulitis of foot 06/30/2019    Neuropathy 06/30/2019    Cellulitis, wound, post-operative 06/30/2019    Left leg swelling 06/30/2019    SIRS (systemic inflammatory response syndrome) ([facility] 06/30/2019    Other hyperlipidemia 10/13/2017    Primary osteoarthritis of right hip 11/03/2016    Primary osteoarthritis of left hip 04/11/2016    Type 2 diabetes mellitus ([facility] 04/08/2016    Lumbar disc herniation 02/22/2016    Lumbar radiculopathy 12/17/2015    Osteoarthritis of spine with radiculopathy, lumbar region 12/17/2015    Class 1 obesity in adult 12/17/2015    Allergic rhinitis 11/23/2015    Essential hypertension 10/16/2015    Vitamin D deficiency 04/14/2015    Fibromyalgia 12/15/2014    Insomnia 07/04/2014    Osteoarthritis 03/27/2014    Lumbar spondylosis 01/07/2014    Neuropathic pain 05/08/2012        Past Surgical History:   Procedure Laterality Date    COLONOSCOPY N/A 9/5/2020    COLONOSCOPY WITH BIOPSY performed by Sanjeev Gavin MD at [facility] CT PTC NEW ACCESS  8/3/2020    CT PTC NEW ACCESS 8/3/2020 [facility] CT    FOOT SURGERY Right 1985    to treat shattered bones    HERNIA REPAIR  2001    DOUBLE HERNIA    HERNIA REPAIR Right 12/9/2019    LAPAROSCOPIC RIGHT INGUINAL HERNIA REPAIR, MESH 10x15 cm PLACEMENT performed by Jesus Avila MD at [address] [place] [address] Left 4/11/2016    ILIAC ARTERY STENT INSERTION N/A 10/11/2020    S/P ILIAC STENT PLACEMENT VISUALIZATION performed by Kings Odell MD at [place] [address] N/A 9/18/2020    LAPAROTOMY EXPLORATORY, CHOLECYSTECTOMY, BOWEL RESECTION, right darian colectomy, partial omentectomy, and splenectomy performed by Sanjeev Gavin MD at [facility] NECK SURGERY  2000    FUSION    PA COLONOSCOPY FLX DX W/COLLJ SPEC WHEN PFRMD N/A 5/7/2018    COLONOSCOPY DIAGNOSTIC performed by Unruly Lorenzo MD at [facility] Left 2014    Dr. Jessica Barry ARTHROSCOPY Left 11 21 14    SHOULDER SURGERY  2001 &2007    RIGHT AND LEFT repair of tears    THROMBECTOMY / EMBOLECTOMY FEMORAL Left 1/1/2021    LEFT LOWER EXTREMITY, VENOGRAM, FOLLOW UP POSSIBLE REMOVAL LYSIS CATHETER performed by Thea Oneal MD at [facility] / EMBOLECTOMY FEMORAL Left 1/2/2021    LEFT LOWER EXTREMITY VENOGRAM performed by Thea Oneal MD at [address] [facility] Right 10/31/2016    Total right hip  Therese Artis MD    UPPER GASTROINTESTINAL ENDOSCOPY N/A 9/4/2020    EGD DIAGNOSTIC ONLY performed by Ki Mckenzie MD at [facility] Left 1/3/2021    LEFT LOWER EXTREMITY VENOGRAM, PLACEMENT OF NEW LYSIS CATHETER performed by Thea Oneal MD at [place] [address]  2007    LEFT WRIST       Family History  Family History   Problem Relation Age of Onset    Hypertension Mother     Arthritis Father     Diabetes Father     Cancer Maternal Grandfather         Skin    Cancer Paternal Uncle         skin    Diabetes Paternal Grandfather     Heart Disease Maternal Grandmother     Stroke Maternal Aunt        Social History    TOBACCO:   reports that he has never smoked. His smokeless tobacco use includes chew. ETOH:   reports previous alcohol use. Home Medications  Prior to Admission medications    Medication Sig Start Date End Date Taking?  Authorizing Provider   furosemide (LASIX) 20 MG tablet Take 1 tablet by mouth daily for 7 days 12/31/20 1/7/21 Yes Alyssa Barakat MD   fluticasone [facility]) 50 MCG/ACT nasal spray instill 2 sprays into each nostril once daily 11/30/20   IGOR Hartman - CNP   Elastic Bandages & Supports (JOBST KNEE HIGH COMPRESSION ) MISC Knee high compression stockings, 20-30 mm/Hg 11/10/20   Judy Reardon MD   insulin lispro (HUMALOG) 100 UNIT/ML injection vial Inject 0-3 Units into the skin nightly **Corrective Bedtime (50%) Low Dose Algorithm**   Glucose: Dose:                No Insulin   140-249 1 Unit   250-349 2 Units   Over 350 3 Units 10/15/20   Elizabeth Chisholm MD   potassium chloride (KLOR-CON M) 20 MEQ extended release tablet Take 1 tablet by mouth 2 times daily (with meals) 10/15/20   Elizabeth Chisholm MD   apixaban (ELIQUIS) 5 MG TABS tablet Take 1 tablet by mouth 2 times daily 10/15/20   Elizabeth Chisholm MD   lisinopril (PRINIVIL;ZESTRIL) 5 MG tablet Take 1 tablet by mouth daily 10/16/20   Elizabeth Chisholm MD   metoprolol succinate (TOPROL XL) 25 MG extended release tablet Take 1 tablet by mouth daily 10/16/20   Elizabeth Chisholm MD   pregabalin (LYRICA) 150 MG capsule Take 300 mg by mouth 2 times daily.     Historical Provider, MD   cholestyramine (QUESTRAN) 4 g packet Take 1 packet by mouth 2 times daily 9/26/20   Skip Luo MD   glucose (GLUTOSE) 40 % GEL Take 37.5 mLs by mouth as needed (low sugar) 9/26/20   Skip Luo MD   insulin glargine (LANTUS) 100 UNIT/ML injection vial Inject 25 Units into the skin Daily 9/27/20   Skip Luo MD   melatonin 3 MG TABS tablet Take 3 mg by mouth nightly as needed (sleep)    Historical Provider, MD   cyclobenzaprine (FLEXERIL) 10 MG tablet Take 10 mg by mouth three times daily    Historical Provider, MD   fenofibrate (TRICOR) 54 MG tablet Take 54 mg by mouth daily    Historical Provider, MD   ferrous sulfate (IRON 325) 325 (65 Fe) MG tablet Take 325 mg by mouth 2 times daily    Historical Provider, MD   hydroxyurea (HYDREA) 500 MG chemo capsule Take 500 mg by mouth 2 times daily    Historical Provider, MD   loperamide (IMODIUM) 2 MG capsule Take 2 mg by mouth 4 times daily as needed for Diarrhea    Historical Provider, MD   insulin lispro (HUMALOG) 100 UNIT/ML injection vial Inject 3 Units into the skin 3 times daily (before meals) Injects 3 units three times daily before meals in addition to following sliding scale  : 0 units  141-180: 1 unit  181-220: 2 units  221-260: 3 units  261-300: 4 units  301-340: 5 units  >341: 6 units and call MD    Historical Provider, MD   pantoprazole (PROTONIX) 40 MG tablet Take 40 mg by mouth daily    Historical Provider, MD   DULoxetine (CYMBALTA) 30 MG extended release capsule Take 1 capsule by mouth daily 5/14/20   IGOR Whitman CNP   pravastatin (PRAVACHOL) 20 MG tablet Take 1 tablet by mouth nightly 5/14/20   IGOR Whitman CNP       Allergies  Allergies   Allergen Reactions    Seasonal      HAYFEVER / sneezing,watery eyes    Tramadol Itching       Review of Systems: Patient to angio when rounding.  Constitutional:  No fever chills or rigors.  Eyes: No changes in vision, discharge, or pain   ENT: No Headaches, hearing loss or vertigo. No mouth sores or sore throat. No change in taste or smell.  Cardiovascular: No chest discomfort, dyspnea on exertion, palpitations or loss of consciousness. or phlebitis.  Respiratory: Has no cough or wheezing, Has no sputum production. Has no hemoptysis, Has no pleuritic pain, .  Gastrointestinal: No abdominal pain, appetite loss, blood in stools. No change in bowel habits. No hematemesis    Genitourinary: Patient acknowledges no dysuria, trouble voiding, or hematuria. No nocturia or increased frequency.  Musculoskeletal: No gait disturbance, weakness or joint complaints.  Integumentary: No rash or pruritis.  Neurological: No headache, diplopia, change in muscle strength, numbness or tingling. No change in gait, balance, coordination, mood, affect, memory, mentation, behavior.  Psychiatric: No anxiety, or depression.  Endocrine: No temperature intolerance. No excessive thirst, fluid intake, or urination. No tremor.     Hematologic/Lymphatic: No abnormal bruising or bleeding, blood clots or swollen lymph nodes.  Allergic/Immunologic: No nasal congestion or hives. Objective  /70   Pulse 82   Temp 98.1 °F (36.7 °C)   Resp 12   Ht 6' 1\" (1.854 m)   Wt 290 lb 2 oz (131.6 kg)   SpO2 98%   BMI 38.28 kg/m²     Physical Exam:   Performance Status:  General: AAO to person, place, time, in no acute distress,   Head and neck : PERRLA, EOMI . Sclera non icteric. Oropharynx : Clear  Neck: no JVD,  no adenopathy  Heart: Regular rate and regular rhythm, no murmur  Lungs: Clear to auscultation   Extremities: No edema,no cyanosis, no clubbing. Abdomen: Soft, non-tender;no masses, no organomegaly  Skin:  No rash. Neurologic:Cranial nerves grossly intact. No focal motor or sensory deficits .     Recent Laboratory Data-   Lab Results   Component Value Date    WBC 9.9 01/04/2021    HGB 11.0 (L) 01/04/2021    HCT 34.7 (L) 01/04/2021    .8 (H) 01/04/2021     01/04/2021    LYMPHOPCT 33.0 12/30/2020    RBC 3.31 (L) 01/04/2021    MCH 33.2 01/04/2021    MCHC 31.7 (L) 01/04/2021    RDW 18.0 (H) 01/04/2021    NEUTOPHILPCT 47.9 12/30/2020    MONOPCT 12.9 (H) 12/30/2020    BASOPCT 1.6 12/30/2020    NEUTROABS 3.58 12/30/2020    LYMPHSABS 2.46 12/30/2020    MONOSABS 0.96 (H) 12/30/2020    EOSABS 0.32 12/30/2020    BASOSABS 0.12 12/30/2020       Lab Results   Component Value Date     01/04/2021    K 4.7 01/04/2021     01/04/2021    CO2 25 01/04/2021    BUN 15 01/04/2021    CREATININE 1.0 01/04/2021    GLUCOSE 74 01/04/2021    CALCIUM 9.1 01/04/2021    PROT 7.0 12/30/2020    LABALBU 4.4 12/30/2020    BILITOT <0.2 12/30/2020    ALKPHOS 126 12/30/2020    AST 20 12/30/2020    ALT 30 12/30/2020    LABGLOM >60 01/04/2021    GFRAA >60 01/04/2021       Lab Results   Component Value Date    IRON 24 (L) 08/01/2020    TIBC 145 (L) 08/01/2020    FERRITIN 539 10/10/2020           Radiology-    Ct Abdomen Pelvis W Iv Contrast Additional Contrast? None    Result Date: 12/31/2020  EXAMINATION: CT OF THE ABDOMEN AND PELVIS WITH CONTRAST 12/31/2020 1:30 am TECHNIQUE: CT of the abdomen and pelvis was performed with the administration of intravenous contrast. Multiplanar reformatted images are provided for review. Dose modulation, iterative reconstruction, and/or weight based adjustment of the mA/kV was utilized to reduce the radiation dose to as low as reasonably achievable. COMPARISON: 09/17/2020 HISTORY: ORDERING SYSTEM PROVIDED HISTORY: BLE DVT r/o IVC clot TECHNOLOGIST PROVIDED HISTORY: CT venogram Reason for exam:->BLE DVT r/o IVC clot Additional Contrast?->None What reading provider will be dictating this exam?->CRC FINDINGS: Lower Chest: The lung bases are clear. No pleural or pericardial effusions. Organs: The liver, pancreas, adrenal glands, and kidneys are normal.  The gallbladder is surgically absent. Previous splenectomy. GI/Bowel: The unopacified stomach and small bowel loops are unremarkable. Fluid-filled loops of small bowel measure up to 2.6 cm in diameter. Postsurgical changes in the proximal colon. The colon is intact. Pelvis: Streak artifact from hip arthroplasties partially obscures the pelvis. Bladder is fluid filled and is unremarkable. Peritoneum/Retroperitoneum: Abdominal aorta has normal caliber. No free fluid or free air. No enlarged lymph nodes. Infrarenal IVC filter present. Interval placement of left common iliac and external iliac vein stent. There is some relative low-attenuation within the left common iliac vein stent as well as within the IVC below the level of the filter relative to the IVC above the filter. Bones/Soft Tissues: Sacral decubitus ulcer. Surrounding soft tissue thickening. No destructive osseous lesions. 1. Infrarenal IVC filter in place. Interval placement of left common iliac and external iliac vein stent. There is low-attenuation within the IVC below the stent as well as within the left common iliac vein stent relative to the IVC above the stent. Findings are likely related to thrombus. 2. Fluid-filled loops of small bowel measure up to 2.6 cm in diameter suggesting partial small bowel obstruction or ileus. 3. Status post interval right hemicolectomy and splenectomy. ASSESSMENT/PLAN :    54 yo male  BL PE s/p IVC  Hx GI bleed  S/p ex lap with cholecystectomy and R hemicolectomy with small bowel resection and side-to-side ileocolonic anastomosis with splenic thrombosis and infarction s/p splenectomy and omentectomy in October 2020 for an extensive left leg DVT that required EKOS and thrombolytics  November hospitalization found RLE DVT     Angio performed 12/31 s/p catheter directed lysis with tPA/heparin infusing via EKOS catheter. To have repeat angio today. CBC was stable on admission platelets well controlled at 563. Consultation evaluation and recommendations acute DVT of LLE and for failure of eliquis. 1/1/2021  Status post follow-up venogram of IVC and left lower extremity with existing lysis catheter  Findings :     Left    Inferior Vena Cava Thrombosed at level of filter and below   Common Iliac Vein thrombosed   External Iliac Vein thrombosed   Common Femoral Vein Some flow but large amount of residual thrombus   Superficial Femoral Vein patent     Apparently advantage Glidewire was placed and lysis catheter was advanced further at the level of the filter with plans for repeat angiogram tomorrow  Continues on intracatheter heparin  Maintain Hydrea 500 mg twice daily  We will follow    1/2/2021  Hemoglobin stable at 11.6  Macrocytosis related to Hydrea  Platelet count stable at 320  Continues on heparin intracatheter, dialysis catheter remains in place with sheath. No hematoma. 1/4/21  -CBC remains stable  - Continue on Hydrea   -S/p angio yesterday 1/3 and today. Cath and sheath were removed today patient tolerated well.    - Now on warfarin     IGOR Issa - CNP  Electronically signed 1/4/2021 at 10:52 AM  Patient seen and examined. Note edited to reflect above.     Андрей Barnhart MD

## 2021-01-04 NOTE — PROGRESS NOTES
Vascular Surgery Progress Note    CC: Follow-up DVT lysis    HISTORY:  The patient is awake, alert, and oriented. IMPRESSION: Chronic occlusion of inferior vena cava. I reviewed with the patient that the follow-up images show no flow from the stent into the inferior vena cava. The filter is narrowed consistent with chronic narrowing and occlusion of the inferior vena cava. I reviewed with him that as he has been on thrombolysis for several days at higher doses, and still did not have resolution of clot, that I do not feel that any additional procedures will be successful. I feel that his IVC occlusion at this point is permanent and cannot be reopened. I would recommend chronic anticoagulation with warfarin. Okay for discharge once INR greater than 2. Alternatively, he can be discharged back to the nursing home on Lovenox until his INR there is greater than 2. PLAN: Start Lovenox 4 hours from completion of procedure and warfarin tonight.     Patient Active Problem List   Diagnosis Code    Neuropathic pain M79.2    Lumbar spondylosis M47.816    Osteoarthritis M19.90    Insomnia G47.00    Fibromyalgia M79.7    Vitamin D deficiency E55.9    Essential hypertension I10    Allergic rhinitis J30.9    Lumbar radiculopathy M54.16    Osteoarthritis of spine with radiculopathy, lumbar region M47.26    Class 1 obesity in adult E66.9    Lumbar disc herniation M51.26    Type 2 diabetes mellitus ([facility] [address] E11.9    Primary osteoarthritis of left hip M16.12    Primary osteoarthritis of right hip M16.11    Cellulitis of foot L03.119    Neuropathy G62.9    Cellulitis, wound, post-operative T81.49XA    Left leg swelling M79.89    SIRS (systemic inflammatory response syndrome) (HCC) R65.10    Cellulitis of sacral region L03.319    Rectus diastasis M62.08    Recurrent unilateral inguinal hernia K40.91    Enterocolitis K52.9    Decubitus ulcer of sacral area L89.159    Abnormal findings on diagnostic imaging of gall bladder R93.2    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    Cyst of spleen D73.4    Splenic abscess D73.3    Anemia D64.9    Other pulmonary embolism without acute cor pulmonale (HCC) I26.99    Thrombocytosis (HCC) D47.3    Acute GI bleeding K92.2    Acute blood loss anemia D62    SIMÓN (acute kidney injury) (HCC) N17.9    Septicemia (HCC) A41.9    Intra-abdominal infection B99.9    Acute deep vein thrombosis (DVT) (HCC) I82.409    Acute thrombosis of inferior vena cava (HCC) I82.220    Skin wound from surgical incision T14. 8XXA    Other hyperlipidemia E78.49    COVID-19 virus infection U07.1    Cellulitis L03.90    Deep vein thrombosis (DVT) of both lower extremities (HCC) I82.403    Ground glass opacity present on imaging of lung R91.8    Tobacco dependence F17.200    Anticoagulated Z79.01    Back pain M54.9    DVT, lower extremity, recurrent, bilateral (HCC) I82.403       Current Medications:    alteplase (ACTIVASE) 10 mg in 0.9% sodium chloride infusion 100 mL Stopped (01/04/21 1250)    dextrose      heparin (PORCINE) Infusion Stopped (01/04/21 1250)    sodium chloride Stopped (01/04/21 1250)      morphine, oxyCODONE-acetaminophen, glucose, dextrose, glucagon (rDNA), dextrose, loperamide, melatonin, sodium chloride flush, acetaminophen, ondansetron    insulin lispro  0-10 Units Subcutaneous 4x Daily AC & HS    cholestyramine  1 packet Oral BID    cyclobenzaprine  10 mg Oral TID    DULoxetine  30 mg Oral Daily    fenofibrate  54 mg Oral Daily    ferrous sulfate  325 mg Oral BID    fluticasone  2 spray Each Nostril Daily    hydroxyurea  500 mg Oral BID    [Held by provider] insulin glargine  25 Units Subcutaneous Daily    insulin lispro  3 Units Subcutaneous TID AC    insulin lispro  0-3 Units Subcutaneous Nightly    lisinopril  5 mg Oral Daily    metoprolol succinate  25 mg Oral Daily    pantoprazole  40 mg Oral Daily    potassium chloride  20 mEq Oral BID WC    pravastatin  20 mg Oral Nightly    pregabalin  300 mg Oral BID    sodium chloride flush  10 mL Intravenous 2 times per day    ceFAZolin (ANCEF) IVPB  3 g Intravenous Q8H          PHYSICAL EXAM:    Vitals:    01/04/21 1200   BP: 107/67   Pulse: 87   Resp: 13   Temp: 98.3 °F (36.8 °C)   SpO2: 94%     CONSTITUTIONAL:  awake, alert, cooperative, no apparent distress    LABS:    Lab Results   Component Value Date    WBC 9.9 01/04/2021    HGB 11.0 (L) 01/04/2021    HCT 34.7 (L) 01/04/2021     01/04/2021    PROTIME 12.8 (H) 11/18/2020    INR 1.1 11/18/2020    APTT 33.5 01/04/2021    K 4.7 01/04/2021    BUN 15 01/04/2021    CREATININE 1.0 01/04/2021       RADIOLOGY:

## 2021-01-04 NOTE — CARE COORDINATION
Cm transition of care: Pt is from [facility]. Per neda Osorio for [address] pt is a long term bed hold. Will need PT/OT evals but can return prior to ins auth.  Also need covid test pror to discharge (neg on 12/31)

## 2021-01-04 NOTE — PLAN OF CARE
Problem: Skin Integrity:  Goal: Will show no infection signs and symptoms  Description: Will show no infection signs and symptoms  Outcome: Met This Shift  Goal: Absence of new skin breakdown  Description: Absence of new skin breakdown  Outcome: Met This Shift     Problem: Falls - Risk of:  Goal: Will remain free from falls  Description: Will remain free from falls  Outcome: Met This Shift  Goal: Absence of physical injury  Description: Absence of physical injury  Outcome: Met This Shift     Problem: Pain:  Goal: Control of acute pain  Description: Control of acute pain  Outcome: Met This Shift  Goal: Control of chronic pain  Description: Control of chronic pain  Outcome: Met This Shift     Problem: Bleeding:  Goal: Will show no signs and symptoms of excessive bleeding  Description: Will show no signs and symptoms of excessive bleeding  Outcome: Met This Shift     Problem: Increased nutrient needs (NI-5.1)  Goal: Food and/or Nutrient Delivery  Description: Individualized approach for food/nutrient provision.   1/4/2021 0951 by Rose Vasquez RD, LD  Outcome: Met This Shift

## 2021-01-04 NOTE — PROCEDURES
Anesthesia: Local.    DESCRIPTION OF PROCEDURE: The patient was identified and the procedure was confirmed. He was placed prone upon the table and the infusion catheter was removed. The left popliteal venous sheath was prepped and draped in the usual sterile fashion. Serial images were obtained. The images identified widely patent superficial femoral vein, common femoral vein, and distal external iliac vein. There was minimal flow through the stent. What flow was seen draining through a collateral vein. There was no flow seen going into the vena cava. 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 I did not feel that it was safe to perform any intervention as the patient has been on thrombolytic therapy for multiple days. I feel that this is a permanent situation at this point and recommend chronic anticoagulation. The sheath was removed and pressure was held to obtain hemostasis. A sterile pressure dressing was applied to the puncture site. The patient tolerated the procedure and was transferred to the recovery area in satisfactory condition.

## 2021-01-04 NOTE — FLOWSHEET NOTE
Pt RLE noted to be with +2/+3 edema. PP +/ verified with doppler. Pt states started last evening. RALPH Schuster notified. Will monitor.  BLE elevated

## 2021-01-04 NOTE — PROGRESS NOTES
CVICU Progress Note    Name: Jesus Cruz  MRN: [id number]    CC: Postoperative Critical Care Management     Indication for Surgery/Procedure: LLE DVT      Important/Relevant PMH/PSH: PE, and IVC filter placement. 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 He had L LE dvt lysis and IVC lysis done 10/2020 and subsequent L Iliac vein stent placement.       Admitted 12/31/2020 presented to ED for bilateral leg swelling and pain.      Procedure: 12/31/2020 Catheter directed lysis      Intake/Output Summary (Last 24 hours) at 1/4/2021 0840  Last data filed at 1/4/2021 0500  Gross per 24 hour   Intake 2746 ml   Output 2000 ml   Net 746 ml       Recent Labs     01/03/21  1625 01/03/21  2311 01/04/21  0445   WBC 7.5 9.4 8.4   HGB 11.5* 11.2* 11.0*   HCT 36.8* 34.1* 34.6*    308 331      Recent Labs     01/02/21  0400 01/03/21  0405 01/04/21  0445    138 138   K 4.1 4.3 4.7    105 102   CO2 26 24 25   BUN 13 15 15   CREATININE 0.9 0.9 1.0   GLUCOSE 114* 89 74   CALCIUM 9.1 9.0 9.1         Physical Exam:    /77   Pulse 88   Temp 98 °F (36.7 °C) (Temporal)   Resp 13   Wt 290 lb 2 oz (131.6 kg)   SpO2 93%   BMI 38.28 kg/m²       General: Awake, alert. No deficits noted, EKOS running, feels like right leg is more swollen today   Eyes: PERRL, anicteric   Pulmonary: CTA bilaterally. No wheezes, no accessory muscle use noted on room air   Cardiovascular:  RRR, no heaves or thrills on palpation  Tele: SR  Abdomen: Soft, nontender, + BS   Extremities: BLE edematous, left thigh sofer with Palpable DP pulses    Neurologic/Psych: A&Ox3, FRANKLIN to command, following commands    Skin: Warm and dry  Incisions: Left popliteal dressing intact, no hematoma       Assessment/Plan: POD #4  1.  LLE DVT s/p catheter directed lysis placed 12/31, repeat venogram 1/1/21 with residual thrombus/ lysis catheter continued, repeat venogram 1/2/21 again with residual clot--lysis continued TPA increased to 2mg  - Frequent neurovascular checks, monitor insertion site for signs of bleeding/hematoma    - tPA/heparin infusing via EKOS catheter   - trending H/H, APTT, fibrinogen q 6 hours while tPA infusing  - NPO for now; bedrest  - Ancef while catheters in place  - requiring frequent IV Morphine and oral oxycodone for pain   - Heme/Onc consulted following for failure of eliquis; on hydrea    - Plan to repeat venogram again today     Dispo: CVICU     Electronically signed by IGOR Cooper - CNP on 1/4/2021 at 8:40 AM

## 2021-01-04 NOTE — PROGRESS NOTES
Pharmacy Consultation Note  (Anticoagulant Dosing and Monitoring)    Initial consult date: 1/4/2021  Consulting physician: Dr Christina Ramesh    Allergies:  Seasonal and Tramadol    50 y.o. male      Ht Readings from Last 1 Encounters:   01/04/21 6' 1\" (1.854 m)     Wt Readings from Last 1 Encounters:   01/01/21 290 lb 2 oz (131.6 kg)         Warfarin Indication Target   INR [employer]   Dose  (if applicable) Diet/Feeding Tube   VTE 2 - 3 N/A  Apixaban PTA General diet       Vitamin K or Blood product  Administration Date                 Warfarin drug-drug interactions  Start  Stop Home Med? Comments    Fenofibrate 54 mg PO daily 12/31  Yes    Cholestyramine 4g PO BID 12/31  Yes May decrease absorption of VKA - separate administration by 2 hrs   Duloxetine ER 30 mg PO daily 12/31  Yes            TSH:    Lab Results   Component Value Date    TSH 0.833 09/18/2020        Hepatic Function Panel:                            Lab Results   Component Value Date    ALKPHOS 126 12/30/2020    ALT 30 12/30/2020    AST 20 12/30/2020    PROT 7.0 12/30/2020    BILITOT <0.2 12/30/2020    BILIDIR <0.2 09/22/2020    IBILI see below 09/22/2020    LABALBU 4.4 12/30/2020    LABALBU 4.8 05/11/2012       Date Warfarin Dose INR Heparin or LMWH HBG/  HCT PLT Comment   1/4/2021 7.5 mg  Enoxaparin   135 mg SQ BID 11/34.6 331                                          Assessment:  · 51 yo M admitted 12/31 with LLE swelling.   Pt with known history of PE s/p IVC filter placement found to have IVC thrombosis and L iliofemoral DVT s/p EKOS lysis catheter placement 12/31; removed 1/4  · Pt was on Apixaban prior to admission and concern for treatment failure  · Pt currently on therapeutic Enoxaparin and to be bridged to Warfarin with Pharmacy consult for management of Warfarin  · INR goal 2 - 3     Plan:  · Will order Warfarin 7.5 mg PO x1 tonight  · Daily PT/INR   · Continue Enoxaparin until the INR is stable within the therapeutic range    Will continue to follow. Thank you for the consult.     Dennis Dunaway PharmD, BCPS, BCCCP  1/4/2021  2:57 PM  Pager: [phone]

## 2021-01-04 NOTE — PROGRESS NOTES
Comprehensive Nutrition Assessment    Type and Reason for Visit:  Initial, RD Nutrition Re-Screen/LOS    Nutrition Recommendations/Plan: Recommend nutrition progression as deemed medically appropriate. Upon progression, recommend ONS for wound healing(Ensure HP w/ Kraig BID). Nutrition Assessment:  Pt w/ pmh DM, COVID-19, 10/2020 s/p ex lap w/ hemicolectomy, SB resection. Admit for leg pain 2/2 DVT. 1/1 s/p thrombectomy/embolectomy, 1/2 s/p LLE venogram, 1/3 s/p vena cava filter placement. Pt w/ increased needs. recs above.     Malnutrition Assessment:  Malnutrition Status:  Insufficient data    Context:  Acute Illness     Findings of the 6 clinical characteristics of malnutrition:  Energy Intake:  No significant decrease in energy intake  Weight Loss:  No significant weight loss     Body Fat Loss:  No significant body fat loss     Muscle Mass Loss:  Unable to assess    Fluid Accumulation:  7 - Moderate to Severe Extremities   Strength:  Not Performed    Estimated Daily Nutrient Needs:  Energy (kcal):  MSJ;1.3x2245=2918; kcal; Weight Used for Energy Requirements:  Current     Protein (g):  150-170 g; Weight Used for Protein Requirements:  Ideal(1.8-2 g/kg IBW)        Fluid (ml/day):  Per Critical Care; Method Used for Fluid Requirements:         Nutrition Related Findings:  A/Ox4, MAP(85), abd wdl, +BS, +3 RLE, LLE, +I/O's      Wounds:  Surgical Incision, Multiple, Pressure Injury(coccyx, 4x4 tibial)       Current Nutrition Therapies:    Diet NPO Effective Now Exceptions are: Ice Chips, Sips with Meds    Anthropometric Measures:  · Height: 6' 1\" (185.4 cm)  · Current Body Weight: 290 lb 2 oz (131.6 kg)(01/01)   · Admission Body Weight: 270 lb (122.5 kg)(12/31 NM)    · Usual Body Weight: 239 lb 3.2 oz (108.5 kg)(09/2020 Per EMR)     · Ideal Body Weight: 184 lbs; % Ideal Body Weight 157.7 %   · BMI: 38.3  · BMI Categories: Obese Class 2 (BMI 35.0 -39.9)       Nutrition Diagnosis:   · Increased nutrient needs related to increase demand for energy/nutrients as evidenced by wounds      Nutrition Interventions:   Food and/or Nutrient Delivery:  Continue NPO  Nutrition Education/Counseling:  Education not indicated   Coordination of Nutrition Care:  Continue to monitor while inpatient    Goals:  Nutrition Progression       Nutrition Monitoring and Evaluation:   Food/Nutrient Intake Outcomes:  Diet Advancement/Tolerance  Physical Signs/Symptoms Outcomes:  Biochemical Data, Nutrition Focused Physical Findings, Weight, Skin, GI Status, Fluid Status or Edema, Hemodynamic Status     Discharge Planning:    No discharge needs at this time     Electronically signed by Usman Elise RD, YRIS on 1/4/21 at 9:50 AM EST    Contact: [phone]

## 2021-01-05 VITALS
TEMPERATURE: 97.9 F | HEART RATE: 86 BPM | SYSTOLIC BLOOD PRESSURE: 120 MMHG | BODY MASS INDEX: 38.45 KG/M2 | HEIGHT: 73 IN | WEIGHT: 290.13 LBS | RESPIRATION RATE: 17 BRPM | DIASTOLIC BLOOD PRESSURE: 74 MMHG | OXYGEN SATURATION: 99 %

## 2021-01-05 LAB
ANION GAP SERPL CALCULATED.3IONS-SCNC: 10 MMOL/L (ref 7–16)
BUN BLDV-MCNC: 12 MG/DL (ref 6–20)
CALCIUM SERPL-MCNC: 9.4 MG/DL (ref 8.6–10.2)
CHLORIDE BLD-SCNC: 102 MMOL/L (ref 98–107)
CO2: 25 MMOL/L (ref 22–29)
CREAT SERPL-MCNC: 0.8 MG/DL (ref 0.7–1.2)
GFR AFRICAN AMERICAN: >60
GFR NON-AFRICAN AMERICAN: >60 ML/MIN/1.73
GLUCOSE BLD-MCNC: 98 MG/DL (ref 74–99)
HCT VFR BLD CALC: 35.5 % (ref 37–54)
HEMOGLOBIN: 11.3 G/DL (ref 12.5–16.5)
INR BLD: 1.1
MCH RBC QN AUTO: 33.5 PG (ref 26–35)
MCHC RBC AUTO-ENTMCNC: 31.8 % (ref 32–34.5)
MCV RBC AUTO: 105.3 FL (ref 80–99.9)
METER GLUCOSE: 95 MG/DL (ref 74–99)
PDW BLD-RTO: 18.2 FL (ref 11.5–15)
PLATELET # BLD: 362 E9/L (ref 130–450)
PMV BLD AUTO: 9.2 FL (ref 7–12)
POTASSIUM SERPL-SCNC: 4.5 MMOL/L (ref 3.5–5)
PROTHROMBIN TIME: 12.4 SEC (ref 9.3–12.4)
RBC # BLD: 3.37 E12/L (ref 3.8–5.8)
SARS-COV-2, NAAT: NOT DETECTED
SODIUM BLD-SCNC: 137 MMOL/L (ref 132–146)
WBC # BLD: 7.5 E9/L (ref 4.5–11.5)

## 2021-01-05 PROCEDURE — 85027 COMPLETE CBC AUTOMATED: CPT

## 2021-01-05 PROCEDURE — 80048 BASIC METABOLIC PNL TOTAL CA: CPT

## 2021-01-05 PROCEDURE — 36415 COLL VENOUS BLD VENIPUNCTURE: CPT

## 2021-01-05 PROCEDURE — U0002 COVID-19 LAB TEST NON-CDC: HCPCS

## 2021-01-05 PROCEDURE — 97165 OT EVAL LOW COMPLEX 30 MIN: CPT

## 2021-01-05 PROCEDURE — 6360000002 HC RX W HCPCS: Performed by: NURSE PRACTITIONER

## 2021-01-05 PROCEDURE — 2580000003 HC RX 258: Performed by: NURSE PRACTITIONER

## 2021-01-05 PROCEDURE — 6370000000 HC RX 637 (ALT 250 FOR IP): Performed by: NURSE PRACTITIONER

## 2021-01-05 PROCEDURE — 85610 PROTHROMBIN TIME: CPT

## 2021-01-05 PROCEDURE — 97161 PT EVAL LOW COMPLEX 20 MIN: CPT

## 2021-01-05 PROCEDURE — 82962 GLUCOSE BLOOD TEST: CPT

## 2021-01-05 RX ORDER — WARFARIN SODIUM 7.5 MG/1
7.5 TABLET ORAL DAILY
Qty: 30 TABLET | Refills: 3 | Status: SHIPPED | OUTPATIENT
Start: 2021-01-05 | End: 2021-04-07

## 2021-01-05 RX ORDER — WARFARIN SODIUM 5 MG/1
7.5 TABLET ORAL
Status: DISCONTINUED | OUTPATIENT
Start: 2021-01-05 | End: 2021-01-05 | Stop reason: HOSPADM

## 2021-01-05 RX ORDER — OXYCODONE HYDROCHLORIDE AND ACETAMINOPHEN 5; 325 MG/1; MG/1
1 TABLET ORAL EVERY 4 HOURS PRN
Qty: 15 TABLET | Refills: 0 | Status: SHIPPED | OUTPATIENT
Start: 2021-01-05 | End: 2021-01-08

## 2021-01-05 RX ADMIN — LISINOPRIL 5 MG: 10 TABLET ORAL at 09:53

## 2021-01-05 RX ADMIN — PREGABALIN 300 MG: 100 CAPSULE ORAL at 09:52

## 2021-01-05 RX ADMIN — OXYCODONE AND ACETAMINOPHEN 1 TABLET: 5; 325 TABLET ORAL at 00:17

## 2021-01-05 RX ADMIN — MORPHINE SULFATE 4 MG: 4 INJECTION, SOLUTION INTRAMUSCULAR; INTRAVENOUS at 03:36

## 2021-01-05 RX ADMIN — CYCLOBENZAPRINE 10 MG: 10 TABLET, FILM COATED ORAL at 08:52

## 2021-01-05 RX ADMIN — POTASSIUM CHLORIDE 20 MEQ: 1500 TABLET, EXTENDED RELEASE ORAL at 09:52

## 2021-01-05 RX ADMIN — OXYCODONE AND ACETAMINOPHEN 1 TABLET: 5; 325 TABLET ORAL at 08:52

## 2021-01-05 RX ADMIN — MORPHINE SULFATE 4 MG: 4 INJECTION, SOLUTION INTRAMUSCULAR; INTRAVENOUS at 08:14

## 2021-01-05 RX ADMIN — MORPHINE SULFATE 4 MG: 4 INJECTION, SOLUTION INTRAMUSCULAR; INTRAVENOUS at 05:37

## 2021-01-05 RX ADMIN — FERROUS SULFATE TAB 325 MG (65 MG ELEMENTAL FE) 325 MG: 325 (65 FE) TAB at 09:52

## 2021-01-05 RX ADMIN — MORPHINE SULFATE 4 MG: 4 INJECTION, SOLUTION INTRAMUSCULAR; INTRAVENOUS at 10:31

## 2021-01-05 RX ADMIN — OXYCODONE AND ACETAMINOPHEN 1 TABLET: 5; 325 TABLET ORAL at 04:31

## 2021-01-05 RX ADMIN — ENOXAPARIN SODIUM 135 MG: 150 INJECTION SUBCUTANEOUS at 09:54

## 2021-01-05 RX ADMIN — Medication 10 ML: at 08:14

## 2021-01-05 RX ADMIN — METOPROLOL SUCCINATE 25 MG: 25 TABLET, EXTENDED RELEASE ORAL at 09:53

## 2021-01-05 RX ADMIN — DULOXETINE HYDROCHLORIDE 30 MG: 30 CAPSULE, DELAYED RELEASE ORAL at 09:52

## 2021-01-05 RX ADMIN — SODIUM CHLORIDE, PRESERVATIVE FREE 10 ML: 5 INJECTION INTRAVENOUS at 10:31

## 2021-01-05 RX ADMIN — HYDROXYUREA 500 MG: 500 CAPSULE ORAL at 09:53

## 2021-01-05 RX ADMIN — PANTOPRAZOLE SODIUM 40 MG: 40 TABLET, DELAYED RELEASE ORAL at 05:38

## 2021-01-05 RX ADMIN — FLUTICASONE PROPIONATE 2 SPRAY: 50 SPRAY, METERED NASAL at 09:52

## 2021-01-05 RX ADMIN — FENOFIBRATE 54 MG: 54 TABLET ORAL at 09:52

## 2021-01-05 RX ADMIN — OXYCODONE AND ACETAMINOPHEN 1 TABLET: 5; 325 TABLET ORAL at 12:53

## 2021-01-05 RX ADMIN — SODIUM CHLORIDE, PRESERVATIVE FREE 10 ML: 5 INJECTION INTRAVENOUS at 05:37

## 2021-01-05 ASSESSMENT — PAIN SCALES - GENERAL
PAINLEVEL_OUTOF10: 8
PAINLEVEL_OUTOF10: 10
PAINLEVEL_OUTOF10: 9
PAINLEVEL_OUTOF10: 10
PAINLEVEL_OUTOF10: 10

## 2021-01-05 ASSESSMENT — PAIN DESCRIPTION - PAIN TYPE: TYPE: ACUTE PAIN;CHRONIC PAIN;SURGICAL PAIN

## 2021-01-05 NOTE — PROGRESS NOTES
Called Olivia spoke with RN, for nurse to nurse. Faxed Med rec, H&P, Covid (-), Scripts, MILANA. Updated on patient.

## 2021-01-05 NOTE — DISCHARGE INSTR - COC
Continuity of Care Form    Patient Name: Samy Rowland   :  1975  MRN:  [id number]    Admit date:  2020  Discharge date:  2021    Code Status Order: Full Code   Advance Directives:   [facility] Documentation     Date/Time Healthcare Directive Type of Healthcare Directive Copy in [address] [facility] [address] Agent's Name Healthcare Agent's Phone Number    [phone]  Unknown, patient unable to respond due to medical condition  --  --  --  --  --          Admitting Physician:  Kam Goodman MD  PCP: Nichol Jackson APRN - CNP    Discharging Nurse: MADDIE Wilson, [address] Unit/Room#: 5228/6021-U  Discharging Unit Phone Number: [phone]    Emergency Contact:   Extended Emergency Contact Information  Primary Emergency Contact: Francois Lundberg, [address] Phone: [phone]  Relation: Parent  Preferred language: Mer Yañez   needed? No  [facility] [address]  Phone: [phone]  Mobile Phone: [phone]  Relation: Child  Preferred language: English   needed?  No    Past Surgical History:  Past Surgical History:   Procedure Laterality Date    COLONOSCOPY N/A 2020    COLONOSCOPY WITH BIOPSY performed by Girma Walters MD at [address] PTC NEW ACCESS  8/3/2020    CT PTC NEW ACCESS 8/3/2020 [facility] CT    FOOT SURGERY Right     to treat shattered bones    HERNIA REPAIR      DOUBLE HERNIA    HERNIA REPAIR Right 2019    LAPAROSCOPIC RIGHT INGUINAL HERNIA REPAIR, MESH 10x15 cm PLACEMENT performed by Wayne Figueredo MD at [address] [place] [address] Left 2016    ILIAC ARTERY STENT INSERTION N/A 10/11/2020    S/P ILIAC STENT PLACEMENT VISUALIZATION performed by Mat Dempsey MD at [place] [address] N/A 2020    LAPAROTOMY EXPLORATORY, CHOLECYSTECTOMY, BOWEL RESECTION, right darian colectomy, partial omentectomy, and splenectomy performed by Juno Medrano MD at [address] FLX DX W/MARIA ALEJANDRA Queen 1978 PFRMD N/A 5/7/2018    COLONOSCOPY DIAGNOSTIC performed by Dianna Ramey MD at [address] [place] [address] Left 2014    Dr. Arlyn Lundberg ARTHROSCOPY Left 11 21 14    SHOULDER SURGERY  2001 &2007    RIGHT AND LEFT repair of tears    THROMBECTOMY / EMBOLECTOMY FEMORAL Left 1/1/2021    LEFT LOWER EXTREMITY, VENOGRAM, FOLLOW UP POSSIBLE REMOVAL LYSIS CATHETER performed by Estella Rodríguez MD at [address] / EMBOLECTOMY FEMORAL Left 1/2/2021    LEFT LOWER EXTREMITY VENOGRAM performed by Estella Rodríguez MD at [facility] Right 10/31/2016    Total right hip  Prudence MD Keily    UPPER GASTROINTESTINAL ENDOSCOPY N/A 9/4/2020    EGD DIAGNOSTIC ONLY performed by Serina Bledsoe MD at [facility] Left 1/3/2021    LEFT LOWER EXTREMITY VENOGRAM, PLACEMENT OF NEW LYSIS CATHETER performed by Estella Rodríguez MD at [place] [address]  2007    LEFT WRIST       Immunization History:   Immunization History   Administered Date(s) Administered    Hib PRP-OMP (PedvaxHIB) 09/24/2020    Influenza Virus Vaccine 11/23/2015, 09/26/2019    Influenza, Quadv, IM, PF (6 mo and older Fluzone, Flulaval, Fluarix, and 3 yrs and older Afluria) 10/04/2016, 10/26/2017, 09/26/2019    Meningococcal B, OMV (Bexsero) 09/23/2020    Meningococcal MCV4O (Menveo) 09/24/2020    Pneumococcal Conjugate 13-valent ([id number]) 09/23/2020    Pneumococcal Polysaccharide ([id number]) 10/04/2016, 07/08/2019    Tdap (Boostrix, Adacel) 10/04/2016       Active Problems:  Patient Active Problem List   Diagnosis Code    Neuropathic pain M79.2    Lumbar spondylosis M47.816    Osteoarthritis M19.90    Insomnia G47.00    Fibromyalgia M79.7    Vitamin D deficiency E55.9    Essential hypertension I10    Allergic Test Resulted    COVID-19 Rule Out 20 COVID-19 (Ordered)   20 Rule-Out Test Resulted    COVID-19 10/09/20 10/09/20 10/09/20 COVID-19   10/23/20     COVID-19 Rule Out 10/09/20 10/09/20 10/09/20 COVID-19 (Ordered)   10/09/20 Rule-Out Test Resulted    C-diff Rule Out 20 Clostridium difficile EIA (Ordered)   20 Rule-Out Test Resulted    COVID-19 Rule Out 20 Covid-19 Ambulatory (Ordered)   20 Rule-Out Test Resulted    COVID-19 Rule Out 20 COVID-19 (Ordered)   20 Rule-Out Test Resulted    COVID-19 Rule Out 20 COVID-19 (Ordered)   20 Rule-Out Test Resulted    C-diff Rule Out 20 Clostridium difficile EIA (Ordered)   20 Nirali Sanders RN    Order discontinued by RN/physician    MRSA 19 Wound Culture   20 Nirali Sanders RN          Nurse Assessment:  Last Vital Signs: /69   Pulse 85   Temp 97.4 °F (36.3 °C) (Temporal)   Resp 16   Ht 6' 1\" (1.854 m)   Wt 290 lb 2 oz (131.6 kg)   SpO2 94%   BMI 38.28 kg/m²     Last documented pain score (0-10 scale): Pain Level: 8  Last Weight:   Wt Readings from Last 1 Encounters:   21 290 lb 2 oz (131.6 kg)     Mental Status:  oriented, alert, coherent, logical, thought processes intact and able to concentrate and follow conversation    IV Access:  - None    Nursing Mobility/ADLs:  Walking   Assisted  Transfer  Assisted  Bathing  Assisted  Dressing  Assisted  Toileting  Assisted  Feeding  Independent  Med Nirali Cast  Independent  Med Delivery   whole    Wound Care Documentation and Therapy:  Wound 10/08/20 Coccyx (Active)   Wound Etiology Other 21 0703   Dressing Status Clean;Dry; Intact 21   Wound Cleansed Cleansed with saline 21   Dressing/Treatment Dry dressing 21   Wound Assessment Slough;Pink/red 21 Manager/ signature: Electronically signed by OSMAN Wagoner on 1/5/2021 at 7:46 AM      PHYSICIAN SECTION    Prognosis: {Prognosis:4349782146}    Condition at Discharge: Dimitri Ramírez Patient Condition:[id number]}    Rehab Potential (if transferring to Rehab): {Prognosis:5218559496}    Recommended Labs or Other Treatments After Discharge: ***    Physician Certification: I certify the above information and transfer of Lisa Caldwell II  is necessary for the continuing treatment of the diagnosis listed and that he requires Justen Kaleb for *** 30 days.      Update Admission H&P: {CHP DME Changes in OLLXD:046470925}    PHYSICIAN SIGNATURE:  {Esignature:[id number]}

## 2021-01-05 NOTE — PLAN OF CARE
Problem: Skin Integrity:  Goal: Will show no infection signs and symptoms  Description: Will show no infection signs and symptoms  1/5/2021 0236 by Betina Jane RN  Outcome: Met This Shift     Problem: Falls - Risk of:  Goal: Will remain free from falls  Description: Will remain free from falls  1/5/2021 0236 by Betina Jane RN  Outcome: Met This Shift     Problem: Pain:  Goal: Pain level will decrease  Description: Pain level will decrease  1/5/2021 0236 by Betina Jane RN  Outcome: Ongoing

## 2021-01-05 NOTE — PROGRESS NOTES
sit: Modified [place]   Sit to supine: Modified [place]    Functional Transfers Stand by Assist   Modified [place]    Functional Mobility Minimal Assist w/ no AD (to/from bathroom)  Modified [place]    Balance Sitting:     Static: independent    Dynamic:SBA  Standing: w/ no AD    Static: SBA    Dynamic: Min. 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; does not appear to impede function        Safety          Hand dominance: R     ROM Strength  Additional Info:    RUE  Proximally: limited to mid range shoulder flexion  Distally: WFL AROM elbow flexion; R wrist drop Proximally: 3-/5  Distally: 3+/5   poor  and poor FMC/dexterity noted during ADL tasks       LUE WFL 4/5 good  and wfl FMC/dexterity noted during ADL tasks         Hearing: [facility] [place]   Sensation:  Pt c/o numbness/tingling in hands/feet (chronic)   Tone: WFL   Edema: none noted            Treatment: Upon arrival, patient lying in bed and agreeable to OT session at this time. RN approved. Therapist facilitated bed mobility, functional transfers (EOB, commode, sit<>stand w/ use of grab bars from commode), standing tolerance tasks(addressing posture/safety) and functional mobility task with no AD (cuing on posture and safety) - skilled cuing on hand placement, posture, body mechanics and safety. Therapist facilitated self-care retraining: UB/LB self-care tasks(socks, gown), simulated toileting task(in bathroom) and seated grooming task (simulated tasks) while educating pt on modified techniques, posture, safety and energy conservation techniques. Skilled monitoring of HR, O2 sats and pts response to treatment. At end of session, patient lying in bed (per pt's request) with call light and phone within reach, all lines and tubes intact. Comments:  Overall pt demonstrated decreased independence and safety during completion of ADL/functional transfers/mobility tasks. Pt demonstrating fair+ understanding of education/techniques, requiring additional training / education. Pt would benefit from continued skilled OT to increase functional independence and quality of life. Eval Complexity: Low    Assessment of current deficits   Functional mobility [x]  ADLs [x] Strength [x]  Cognition []  Functional transfers  [x] IADLs [x] Safety Awareness [x]  Endurance [x]  Fine Motor Coordination [x] Balance [x] Vision/perception [] Sensation []   Gross Motor Coordination [] ROM [x] Delirium []                  Motor Control []    Plan of Care: 1-3 days/week for 1-2 weeks PRN   [x]ADL retraining/adapted techniques and AE recommendations to increase functional independence within precautions                    [x]Energy conservation techniques to improve tolerance for selfcare routine   [x]Functional transfer/mobility training/DME recommendations for increased independence, safety and fall prevention         [x]Patient/family education to increase safety and functional independence             [x]Environmental modifications for safe mobility and completion of ADLs                             [x]Cognitive retraining ex's to improve problem solving skills & safe participation in ADLs/IADLs     [x]Sensory re-education techniques to improve extremity awareness, maintain skin integrity and improve hand function                             []Visual/Perceptual retraining  to improve body awareness and safety during transfers and ADLs  [x]Splinting/positioning needs to maintain joint/skin integrity and prevent contractures  [x]Therapeutic activity to improve functional performance during ADLs. [x]Therapeutic exercise to improve tolerance and functional strength for ADLs   [x]Balance retraining/tolerance tasks for facilitation of postural control with dynamic challenges during ADLs .   [x]Neuromuscular re-education: facilitation of righting/equilibrium reactions, midline orientation, scapular stability/mobility, Normalization muscle     tone and facilitation active functional movement/Attention                         []Delirium prevention/treatment    []Positioning to improve functional independence  []Other:       Rehab Potential: Good for established goals     Patient / Family Goal: not stated      Patient and/or family were instructed on functional diagnosis, prognosis/goals and OT plan of care. Demonstrated good understanding. Time In: 11:00  Time Out: 11:15  Total Treatment Time: Eval only    Min Units   OT Eval Low 97165  x  1   OT Eval Medium 78611      OT Eval High 98880       OT Re-Eval [id number]       Therapeutic Ex 96863       Therapeutic Activities 50741       ADL/Self Care 14114       Orthotic Management 37179       Neuro Re-Ed 31698       Non-Billable Time          Evaluation Time includes thorough review of current medical information, gathering information on past medical history/social history and prior level of function, completion of standardized testing/informal observation of tasks, assessment of data and education on plan of care and goals.     Rigo Toledo, OTR/L #[id number]

## 2021-01-05 NOTE — CARE COORDINATION
SOCIAL WORK/CASEMANAGEMENT TRANSITION OF CARE [id number] [place]  [facility] [address], [place], [phone]): back to [facility] today. Snf;loc. PT and OT to see pt before he goes and the julia will send for precert.  via tremaine carriers paid for by julia around 4:30 p.m. pt is in agreement. covid sent.  Nila Lerma  1/5/2021

## 2021-01-05 NOTE — PROGRESS NOTES
Pharmacy Consultation Note  (Anticoagulant Dosing and Monitoring)    Initial consult date: 1/4/2021  Consulting physician: Dr Marie Floyd    Allergies:  Seasonal and Tramadol    50 y.o. male      Ht Readings from Last 1 Encounters:   01/04/21 6' 1\" (1.854 m)     Wt Readings from Last 1 Encounters:   01/01/21 290 lb 2 oz (131.6 kg)         Warfarin Indication Target   INR [place]   Dose  (if applicable) Diet/Feeding Tube   VTE 2 - 3 N/A  Apixaban PTA General diet       Vitamin K or Blood product  Administration Date                 Warfarin drug-drug interactions  Start  Stop Home Med? Comments    Fenofibrate 54 mg PO daily 12/31  Yes    Cholestyramine 4g PO BID 12/31  Yes May decrease absorption of VKA - separate administration by 2 hrs   Duloxetine ER 30 mg PO daily 12/31  Yes            TSH:    Lab Results   Component Value Date    TSH 0.833 09/18/2020        Hepatic Function Panel:                            Lab Results   Component Value Date    ALKPHOS 126 12/30/2020    ALT 30 12/30/2020    AST 20 12/30/2020    PROT 7.0 12/30/2020    BILITOT <0.2 12/30/2020    BILIDIR <0.2 09/22/2020    IBILI see below 09/22/2020    LABALBU 4.4 12/30/2020    LABALBU 4.8 05/11/2012       Date Warfarin Dose INR Heparin or LMWH HBG/  HCT PLT Comment   1/4/2021 7.5 mg 1.1 Enoxaparin   135 mg SQ BID 11/34.6 331    1/5 7.5 mg 1.1 Enoxaparin   135 mg SQ BID 11.3/35.5 362                                 Assessment:  · 49 yo M admitted 12/31 with LLE swelling. Pt with known history of PE s/p IVC filter placement found to have IVC thrombosis and L iliofemoral DVT s/p EKOS lysis catheter placement 12/31; removed 1/4  · Pt was on Apixaban prior to admission and concern for treatment failure  · Pt currently on therapeutic Enoxaparin and to be bridged to Warfarin with Pharmacy consult for management of Warfarin  · INR today is 1.1; INR goal 2 - 3     Plan:  · Pt received Warfarin 7.5 mg PO x1 last night.  Will order Warfarin 7.5 mg PO x1 again tonight  · Daily PT/INR   · Continue Enoxaparin until the INR is stable within the therapeutic range    Will continue to follow.       Dhruv Jalloh PharmD, BCPS, [facility]  1/5/2021  12:06 PM  Pager: [phone]

## 2021-01-05 NOTE — PROGRESS NOTES
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 Pt lives alone in an  with 2 stairs and a rail to enter. Pt ambulated while pushing wheelchair for short distances with PT at [facility]. OBJECTIVE:   Initial Evaluation  Date: 20 Treatment Short Term/ Long Term   Goals   AM-PAC 6 Clicks 84/86     Was pt agreeable to Eval/treatment? Yes     Does pt have pain? Reports pain \"everywhere\"      Bed Mobility  Rolling: SBA  Supine to sit: SBA  Sit to supine: SBA  Scooting: SBA  Rolling: Independent   Supine to sit: Independent   Sit to supine: Independent   Scooting: Independent    Transfers Sit to stand: SBA  Stand to sit: SBA  Stand pivot: SBA  Sit to stand: Independent   Stand to sit:  Independent   Stand pivot: Independent    Ambulation    20 feet x2 with no AD CGA  >100 feet with no AD Independent    Stair negotiation: ascended and descended  NT  >4 steps with 1 rail SBA   ROM BUE:  Per OT  BLE:  WFL     Strength BUE:  Per OT  BLE:  WFL -- limited by some pain      Balance Sitting EOB:  Independent   Dynamic Standing:  SBA<>CGA  Sitting EOB:  Independent   Dynamic Standing:  Independent      Pt is A & O x 4  Sensation:  Pt reports numbness and tingling to B hands and feet   Edema:  Unremarkable     Therapeutic Exercises:     BLE ROM    Patient education  Pt educated on safety during functional mobility    Patient response to education:   Pt verbalized understanding Pt demonstrated skill Pt requires further education in this area   Yes  Yes  Reinforce      ASSESSMENT:    Comments:  Pt received supine and agreeable to PT evaluation. Vitals monitored during session. Demonstrates fair ability to get in and out of bed without assist but uses handrails and HOB raised. BLE ROM WNL. Able to ambulate to bathroom and perform toilet transfer on/off. Ambulated back to bed and assisted himself back to supine. During ambulation demonstrates mild unsteadiness and reaches for environment to steady himself. Fair gait speed. No LOB noted. Pt left back in bed with call button in reach, lines attached, and needs met. Treatment:  Patient practiced and was instructed in the following treatment:     eval only     Pt's/ family goals   1. Back to [facility]    Patient and or family understand(s) diagnosis, prognosis, and plan of care. Yes     PLAN:    Current Treatment Recommendations     [x] Strengthening     [] ROM   [x] Balance Training   [x] Endurance Training   [x] Transfer Training   [x] Gait Training   [x] Stair Training   [x] Positioning   [x] Safety and Education Training   [x] Patient/Caregiver Education   [x] HEP  [] Other     Frequency of treatments: 2-5x/week x 1-2 weeks. Time in  1100  Time out  1110    Total Treatment Time  0 minutes     Evaluation Time includes thorough review of current medical information, gathering information on past medical history/social history and prior level of function, completion of standardized testing/informal observation of tasks, assessment of data and education on plan of care and goals.     CPT codes:  [x] Low Complexity PT evaluation 87247  [] Moderate Complexity PT evaluation 70654  [] High Complexity PT evaluation 83903  [] PT Re-evaluation [id number]  [] Gait training 21282 -- minutes  [] Manual therapy [id number] -- minutes  [] Therapeutic activities 85604 -- minutes  [] Therapeutic exercises 54122 -- minutes  [] Neuromuscular reeducation 80840 -- minutes     Juani Grady PT, DPT  [id number]

## 2021-01-30 NOTE — DISCHARGE SUMMARY
Hospital Medicine Discharge Summary    [facility]      Patient's PCP: IGOR Sargent CNP    Admit Date: 12/30/2020     Discharge Date: 1/5/2021      Admitting Physician: Kath Martinez MD     Discharge Physician: Dinora Sharp MD     Discharge Diagnoses: Active Hospital Problems    Diagnosis Date Noted    DVT, lower extremity, recurrent, bilateral ([facility] [I82.403] 12/31/2020    [facility] [D47.3] 08/10/2020    Type [facility] [E11.9] 04/08/2016    Class 1 obesity in adult [E66.9] 12/17/2015    Essential hypertension [I10] 10/16/2015       The patient was seen and examined on day of discharge and this discharge summary is in conjunction with any daily progress note from day of discharge. Hospital Course:   50 y. o. 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 He states he has had bilateral leg pain and swelling radiating from his groin to his ankles for the past two days that has not improved and described as burning pain. He is on anticoagulation with an IVC in place for known DVTs and PEs.  He was sent from the facility for worsening symptoms. He was hospitalized in October of 2020 with extensive left leg DVT requiring EKOS and thrombolytics and was COVID positive at that time. Discharged on lovenox bridge to warfarin in stable condition to SNF on 1/5/21. Exam:     /74   Pulse 86   Temp 97.9 °F (36.6 °C) (Temporal)   Resp 17   Ht 6' 1\" (1.854 m)   Wt 290 lb 2 oz (131.6 kg)   SpO2 99%   BMI 38.28 kg/m²     General appearance:  appears stated age; no apparent distress  HEENT:  Conjunctivae/corneas clear. Neck: Supple.  No jugular venous distention. Respiratory: symmetrical; clear to auscultation bilaterally; no wheezes; no rhonchi; no rales, snoring respirations  Cardiovascular: rhythm regular; rate controlled; no murmurs  Abdomen: Soft, nontender, nondistended  Extremities:  peripheral pulses present; + peripheral edema; no ulcers, LLE catheter with dressing over catheter  Musculoskeletal: No clubbing, cyanosis, no bilateral lower extremity edema. Brisk capillary refill. Skin:  No rashes on visible skin  Neurologic: sleeping    Consults:     IP CONSULT TO INTERNAL MEDICINE  IP CONSULT TO VASCULAR SURGERY  IP CONSULT TO ONCOLOGY  PHARMACY TO DOSE WARFARIN    Significant Diagnostic Studies:     CT ABDOMEN PELVIS W IV CONTRAST Additional Contrast? None   Final Result   1. Infrarenal IVC filter in place. Interval placement of left common iliac   and external iliac vein stent. There is low-attenuation within the IVC below   the stent as well as within the left common iliac vein stent relative to the   IVC above the stent. Findings are likely related to thrombus. 2. Fluid-filled loops of small bowel measure up to 2.6 cm in diameter   suggesting partial small bowel obstruction or ileus. 3. Status post interval right hemicolectomy and splenectomy. Disposition:  snf     Discharge Instructions/Follow-up:  Keep scheduled follow up appointments. Take medications as prescribed. Code Status:  Prior     Activity: activity as tolerated    Diet: regular diet    Labs:  For convenience and continuity at follow-up the following most recent labs are provided:      CBC:    Lab Results   Component Value Date    WBC 7.5 01/05/2021    HGB 11.3 01/05/2021    HCT 35.5 01/05/2021     01/05/2021       Renal:    Lab Results   Component Value Date     01/05/2021    K 4.5 01/05/2021    K 4.3 12/30/2020     01/05/2021    CO2 25 01/05/2021    BUN 12 01/05/2021    CREATININE 0.8 01/05/2021    CALCIUM 9.4 01/05/2021    PHOS 2.9 10/10/2020       Discharge Medications:     Discharge Medication List as of 1/5/2021  2:37 PM           Details   oxyCODONE-acetaminophen (PERCOCET) 5-325 MG per tablet Take 1 tablet by mouth every 4 hours as needed for Pain for up to 3 days. , Disp-15 tablet, R-0Print      enoxaparin (LOVENOX) 150 MG/ML injection Inject 0.87 mLs into the skin 2 times daily for 7 days, Disp-12. 18 mL, R-0NO PRINT      warfarin (COUMADIN) 7.5 MG tablet Take 1 tablet by mouth daily, Disp-30 tablet, R-3Normal      furosemide (LASIX) 20 MG tablet Take 1 tablet by mouth daily for 7 days, Disp-7 tablet, R-0Normal              Details   fluticasone (FLONASE) 50 MCG/ACT nasal spray instill 2 sprays into each nostril once daily, Disp-16 g, R-5Normal      Elastic Bandages & Supports (JOBST KNEE HIGH COMPRESSION SM) MISC Disp-2 each,R-3, PrintKnee high compression stockings, 20-30 mm/Hg      ! ! insulin lispro (HUMALOG) 100 UNIT/ML injection vial Inject 0-3 Units into the skin nightly **Corrective Bedtime (50%) Low Dose Algorithm**   Glucose: Dose:                No Insulin   140-249 1 Unit   250-349 2 Units   Over 350 3 Units, Disp-1 vial,R-3Normal      potassium chloride (KLOR-CON M) 20 MEQ extended release tablet Take 1 tablet by mouth 2 times daily (with meals), Disp-60 tablet,R-3Normal      lisinopril (PRINIVIL;ZESTRIL) 5 MG tablet Take 1 tablet by mouth daily, Disp-30 tablet,R-0Print      metoprolol succinate (TOPROL XL) 25 MG extended release tablet Take 1 tablet by mouth daily, Disp-30 tablet,R-0Print      pregabalin (LYRICA) 150 MG capsule Take 300 mg by mouth 2 times daily. Historical Med      cholestyramine (QUESTRAN) 4 g packet Take 1 packet by mouth 2 times daily, Disp-90 packet,R-3Normal      glucose (GLUTOSE) 40 % GEL Take 37.5 mLs by mouth as needed (low sugar), Disp-45 g,R-1Normal      insulin glargine (LANTUS) 100 UNIT/ML injection vial Inject 25 Units into the skin Daily, Disp-1 vial,R-3Normal      melatonin 3 MG TABS tablet Take 3 mg by mouth nightly as needed (sleep)Historical Med      cyclobenzaprine (FLEXERIL) 10 MG tablet Take 10 mg by mouth three times dailyHistorical Med      fenofibrate (TRICOR) 54 MG tablet Take 54 mg by mouth dailyHistorical Med      ferrous sulfate (IRON 325) 325 (65 Fe) MG tablet Take 325 mg by mouth 2 times dailyHistorical Med      hydroxyurea (HYDREA) 500 MG chemo capsule Take 500 mg by mouth 2 times dailyHistorical Med      loperamide (IMODIUM) 2 MG capsule Take 2 mg by mouth 4 times daily as needed for DiarrheaHistorical Med      !! insulin lispro (HUMALOG) 100 UNIT/ML injection vial Inject 3 Units into the skin 3 times daily (before meals) Injects 3 units three times daily before meals in addition to following sliding scale  : 0 units  141-180: 1 unit  181-220: 2 units  221-260: 3 units  261-300: 4 units  301-340: 5 units  >34 1: 6 units and call MDHistorical Med      pantoprazole (PROTONIX) 40 MG tablet Take 40 mg by mouth dailyHistorical Med      DULoxetine (CYMBALTA) 30 MG extended release capsule Take 1 capsule by mouth daily, Disp-90 capsule,R-1Normal      pravastatin (PRAVACHOL) 20 MG tablet Take 1 tablet by mouth nightly, Disp-90 tablet,R-1Normal       !! - Potential duplicate medications found. Please discuss with provider. Time Spent on discharge is more than 45 minutes in the examination, evaluation, counseling and review of medications and discharge plan.       Signed:    Long Leahy MD   1/30/2021

## 2021-02-16 NOTE — PROGRESS NOTES
[address]   Department of Internal Medicine   Internal Medicine Residency   MICU Progress Note    Patient:  Ashtyn Hale 53 y.o. male  MRN: [id number]     Date of Service: 8/3/2020    Allergy: Seasonal and Tramadol    Subjective     Pt seen this AM, no new complaints, continues to have abdominal discomfort. Cholecystostomy tube placed today. No issues overnight, denies any further complaints. Objective     VS: /68   Pulse 109   Temp 97.9 °F (36.6 °C)   Resp 19   Ht 6' 1\" (1.854 m)   Wt 255 lb 11.7 oz (116 kg)   SpO2 96%   BMI 33.74 kg/m²           I & O - 24hr:     Intake/Output Summary (Last 24 hours) at 8/3/2020 1818  Last data filed at 8/3/2020 1700  Gross per 24 hour   Intake 1587 ml   Output 2860 ml   Net -1273 ml       Physical Exam:  · General Appearance: alert, appears stated age and cooperative  · Skin: warm and dry extremities, coccygeal wound (see picture on 8/2 Critical Care progress note)  · Neck: supple, symmetrical, trachea midline  · Lung: clear to auscultation bilaterally  · Heart: regular rate and rhythm, S1, S2 normal, no murmur, click, rub or gallop  · Abdomen: Diffuse abdominal tenderness. Non distended, normal bowel sounds, no masses or organomegaly  · Extremities:  extremities normal, atraumatic, no cyanosis or edema  · Musculoskeletal: No joint swelling, no muscle tenderness. ROM normal in all joints of extremities.    · Neurologic: Mental status: Alert, oriented, thought content appropriate    Lines     site day    Art line   None    TLC None    PICC L Arm 7/31   Hemoaccess Right peripheral IV 8/2      Medications     Infusions: (Fluid, Sedation, Vasopressors)  IVF:    NaCl 0.9%: rate 125 ml/h for 1000 ml  Vasopressors   None  Sedation   None    Nutrition:   General Diet    ATB:   Antibiotics  Days   Zosyn 8/1             Skin issues: coccygeal wound    Labs     CBC:   Lab Results   Component Value Date    WBC 10.3 08/03/2020    RBC 2.94 08/03/2020 HGB 8.0 08/03/2020    HCT 26.4 08/03/2020    MCV 89.8 08/03/2020    MCH 27.2 08/03/2020    MCHC 30.3 08/03/2020    RDW 15.1 08/03/2020     08/03/2020    MPV 8.3 08/03/2020     PTT:    Lab Results   Component Value Date    APTT 34.6 08/03/2020   [APTT}  Last 3 Troponin:    Lab Results   Component Value Date    TROPONINI 0.07 08/02/2020    TROPONINI 0.02 08/02/2020    TROPONINI 0.08 08/01/2020         Resident's Assessment and Plan     Pt is 53year old male with PMHx obesity, HTN, DMT2 who [place] [facility] [place] diffuse abdominal pain and diarrhea concerning for Acalculous Cholecystitis and Splenic Abscess    Neurology:   1. Right Hand hemiparesis - Pt has loss of movement of R hand. CT 8/1/2020 shows No acute intracranial abnormality.      ID:   1. Sepsis, likely 2/2 acute acalculous cholecystis vs splenic abscess - BP lowest at 74/58 at 2100 on 8/1/2020  and tachycardia to 120 bpm. EKG shows tachycardic junctional rhythm. Given Metoprolol 2.5 IV. HR dropped to 100s. Maintaining. WBC 13.0 =>8.9=>8=>10.3, Procal 0.09, CRP 3.9, Lactic acid 2.1=>0.7   - Coccyx Cx showing Corynebacterium, follow sensitivities   - Initially on Levophed 10 mcg/min to maintain MAP > 65, weaned currently, MAP stable   - Zosyn    - ID following    2. Sacral decubitus ulcer, stage III - does not look infected, with good granulation tissue . Packed in MICU and dressing applied   - Continue to keep clean, dry, intact   - Wound care     Cardiology:  1. HLD   - Continue fenofibrate      2. HTN   - Hold lisinopril (hypotension)    3. Tachycardia - Junctional tachycardia vs SVT. Improved, sinus tachy now. Likely 2/2 sepsis. Echo 8/3 showing Mild LVH, LV septal motion disorder consistent with conduction disorder, EF 60-65%   - Cardiology following     Pulmonary:   1.  Hx of PE 6/2020 - On therapeutic Lovenox at home, limited past workup documentation   - On Heparin, resumed after procedure   - Plan to transition back to [place]elsonia Forget, Mae Covarrubias, MARY BETH, [facility]

## 2021-03-22 ENCOUNTER — TELEPHONE (OUTPATIENT)
Dept: FAMILY MEDICINE CLINIC | Age: 46
End: 2021-03-22

## 2021-03-22 NOTE — TELEPHONE ENCOUNTER
Pt left a message asking if you can call in a script for an INR meter to [phone] ,he will have a home nurse a couple days a week     [phone]

## 2021-03-29 ENCOUNTER — OFFICE VISIT (OUTPATIENT)
Dept: FAMILY MEDICINE CLINIC | Age: 46
End: 2021-03-29
Payer: MEDICAID

## 2021-03-29 ENCOUNTER — TELEPHONE (OUTPATIENT)
Dept: FAMILY MEDICINE CLINIC | Age: 46
End: 2021-03-29

## 2021-03-29 ENCOUNTER — TELEPHONE (OUTPATIENT)
Dept: ADMINISTRATIVE | Age: 46
End: 2021-03-29

## 2021-03-29 VITALS
HEART RATE: 102 BPM | BODY MASS INDEX: 41.24 KG/M2 | WEIGHT: 311.2 LBS | HEIGHT: 73 IN | SYSTOLIC BLOOD PRESSURE: 127 MMHG | DIASTOLIC BLOOD PRESSURE: 88 MMHG | RESPIRATION RATE: 18 BRPM | OXYGEN SATURATION: 99 % | TEMPERATURE: 98.3 F

## 2021-03-29 DIAGNOSIS — M25.512 CHRONIC LEFT SHOULDER PAIN: ICD-10-CM

## 2021-03-29 DIAGNOSIS — I27.82 CHRONIC PULMONARY EMBOLISM, UNSPECIFIED PULMONARY EMBOLISM TYPE, UNSPECIFIED WHETHER ACUTE COR PULMONALE PRESENT (HCC): ICD-10-CM

## 2021-03-29 DIAGNOSIS — M21.331 WRIST DROP, RIGHT WRIST: ICD-10-CM

## 2021-03-29 DIAGNOSIS — I10 ESSENTIAL HYPERTENSION: ICD-10-CM

## 2021-03-29 DIAGNOSIS — E78.2 MIXED HYPERLIPIDEMIA: ICD-10-CM

## 2021-03-29 DIAGNOSIS — G89.29 CHRONIC LEFT SHOULDER PAIN: ICD-10-CM

## 2021-03-29 DIAGNOSIS — E11.9 TYPE 2 DIABETES MELLITUS WITHOUT COMPLICATION, WITHOUT LONG-TERM CURRENT USE OF INSULIN (HCC): Primary | ICD-10-CM

## 2021-03-29 DIAGNOSIS — M54.16 LUMBAR RADICULOPATHY: ICD-10-CM

## 2021-03-29 DIAGNOSIS — I82.4Y3 DEEP VEIN THROMBOSIS (DVT) OF PROXIMAL VEIN OF BOTH LOWER EXTREMITIES, UNSPECIFIED CHRONICITY (HCC): ICD-10-CM

## 2021-03-29 DIAGNOSIS — E11.9 TYPE 2 DIABETES MELLITUS WITHOUT COMPLICATION, WITHOUT LONG-TERM CURRENT USE OF INSULIN (HCC): ICD-10-CM

## 2021-03-29 LAB
ALBUMIN SERPL-MCNC: 4.6 G/DL (ref 3.5–5.2)
ALP BLD-CCNC: 138 U/L (ref 40–129)
ALT SERPL-CCNC: 28 U/L (ref 0–40)
ANION GAP SERPL CALCULATED.3IONS-SCNC: 13 MMOL/L (ref 7–16)
ANISOCYTOSIS: ABNORMAL
AST SERPL-CCNC: 26 U/L (ref 0–39)
BASOPHILS ABSOLUTE: 0.09 E9/L (ref 0–0.2)
BASOPHILS RELATIVE PERCENT: 1.2 % (ref 0–2)
BILIRUB SERPL-MCNC: 0.2 MG/DL (ref 0–1.2)
BUN BLDV-MCNC: 14 MG/DL (ref 6–20)
CALCIUM SERPL-MCNC: 9.8 MG/DL (ref 8.6–10.2)
CHLORIDE BLD-SCNC: 100 MMOL/L (ref 98–107)
CHOLESTEROL, TOTAL: 154 MG/DL (ref 0–199)
CO2: 24 MMOL/L (ref 22–29)
CREAT SERPL-MCNC: 0.9 MG/DL (ref 0.7–1.2)
EOSINOPHILS ABSOLUTE: 0.13 E9/L (ref 0.05–0.5)
EOSINOPHILS RELATIVE PERCENT: 1.7 % (ref 0–6)
GFR AFRICAN AMERICAN: >60
GFR NON-AFRICAN AMERICAN: >60 ML/MIN/1.73
GLUCOSE BLD-MCNC: 115 MG/DL (ref 74–99)
HBA1C MFR BLD: 6.5 %
HCT VFR BLD CALC: 42.1 % (ref 37–54)
HDLC SERPL-MCNC: 38 MG/DL
HEMOGLOBIN: 13.8 G/DL (ref 12.5–16.5)
IMMATURE GRANULOCYTES #: 0.03 E9/L
IMMATURE GRANULOCYTES %: 0.4 % (ref 0–5)
LDL CHOLESTEROL CALCULATED: 76 MG/DL (ref 0–99)
LYMPHOCYTES ABSOLUTE: 1.85 E9/L (ref 1.5–4)
LYMPHOCYTES RELATIVE PERCENT: 23.8 % (ref 20–42)
MCH RBC QN AUTO: 36.5 PG (ref 26–35)
MCHC RBC AUTO-ENTMCNC: 32.8 % (ref 32–34.5)
MCV RBC AUTO: 111.4 FL (ref 80–99.9)
MONOCYTES ABSOLUTE: 0.69 E9/L (ref 0.1–0.95)
MONOCYTES RELATIVE PERCENT: 8.9 % (ref 2–12)
NEUTROPHILS ABSOLUTE: 4.98 E9/L (ref 1.8–7.3)
NEUTROPHILS RELATIVE PERCENT: 64 % (ref 43–80)
PDW BLD-RTO: 15.4 FL (ref 11.5–15)
PLATELET # BLD: 515 E9/L (ref 130–450)
PMV BLD AUTO: 9 FL (ref 7–12)
POLYCHROMASIA: ABNORMAL
POTASSIUM SERPL-SCNC: 5 MMOL/L (ref 3.5–5)
RBC # BLD: 3.78 E12/L (ref 3.8–5.8)
SODIUM BLD-SCNC: 137 MMOL/L (ref 132–146)
TOTAL PROTEIN: 7.7 G/DL (ref 6.4–8.3)
TRIGL SERPL-MCNC: 202 MG/DL (ref 0–149)
TSH SERPL DL<=0.05 MIU/L-ACNC: 1.65 UIU/ML (ref 0.27–4.2)
VLDLC SERPL CALC-MCNC: 40 MG/DL
WBC # BLD: 7.8 E9/L (ref 4.5–11.5)

## 2021-03-29 PROCEDURE — 83036 HEMOGLOBIN GLYCOSYLATED A1C: CPT | Performed by: NURSE PRACTITIONER

## 2021-03-29 PROCEDURE — 99215 OFFICE O/P EST HI 40 MIN: CPT | Performed by: NURSE PRACTITIONER

## 2021-03-29 RX ORDER — PREGABALIN 150 MG/1
300 CAPSULE ORAL 2 TIMES DAILY
Qty: 60 CAPSULE | Status: CANCELLED | OUTPATIENT
Start: 2021-03-29

## 2021-03-29 RX ORDER — FLUTICASONE PROPIONATE 50 MCG
SPRAY, SUSPENSION (ML) NASAL
Qty: 16 G | Refills: 5 | Status: SHIPPED
Start: 2021-03-29 | End: 2021-08-31

## 2021-03-29 ASSESSMENT — ENCOUNTER SYMPTOMS
STRIDOR: 0
CONSTIPATION: 0
ABDOMINAL DISTENTION: 0
CHOKING: 0
COUGH: 0
ABDOMINAL PAIN: 0
SINUS PAIN: 0
SHORTNESS OF BREATH: 0
TROUBLE SWALLOWING: 0
VOMITING: 0
PHOTOPHOBIA: 0
COLOR CHANGE: 0
BLOOD IN STOOL: 0
NAUSEA: 0
ANAL BLEEDING: 0
FACIAL SWELLING: 0
EYE PAIN: 0
APNEA: 0
EYE DISCHARGE: 0
WHEEZING: 0
EYE ITCHING: 0
RHINORRHEA: 0
VOICE CHANGE: 0
RECTAL PAIN: 0
EYE REDNESS: 0
CHEST TIGHTNESS: 0
SINUS PRESSURE: 0
DIARRHEA: 0
SORE THROAT: 0

## 2021-03-29 ASSESSMENT — PATIENT HEALTH QUESTIONNAIRE - PHQ9
1. LITTLE INTEREST OR PLEASURE IN DOING THINGS: 1
SUM OF ALL RESPONSES TO PHQ QUESTIONS 1-9: 1
SUM OF ALL RESPONSES TO PHQ QUESTIONS 1-9: 1

## 2021-03-29 NOTE — TELEPHONE ENCOUNTER
[facility] home called to let Dr Susie Herrera know pt is DC today, asking if pt needs to do HFU with Dr Susie Herrera, please advise pt at [phone]

## 2021-03-29 NOTE — PROGRESS NOTES
Francois Hogan is a 50 y.o. male who presents today for   Chief Complaint   Patient presents with    Follow-Up from Hospital    Diabetes    Hypertension    Hyperlipidemia    Pain     Left Shoulder/Lumbar Radiculopathy    Other     DVT BLE    Other     right wrist drop         HPI      Pt presents today for f/u, he is currently at [facility], pt was hospitalized approximately 9 months in [place], no medical records present today only meds/diagnoses from assisted living facility, pt is suppose to be d/c possibly today from facility and will be living with his brother, will need to obtain d/c instructions from facility once completed, 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 Home blood sugar records: trend: fluctuating a bit  Any episodes of hypoglycemia? no  Eye exam current (within one year): no  Tobacco history: He  reports that he has never smoked. His smokeless tobacco use includes chew. Daily Aspirin? No: Coumadin Management  A1C today is 6.5%- no change in treatment plan      Hypertension:  Home blood pressure monitoring: No.  He is adherent to a low sodium diet. Patient denies chest pain, shortness of breath, lightheadedness, blurred vision and palpitations. Antihypertensive medication side effects: no medication side effects noted. Use of agents associated with hypertension: none. Hyperlipidemia:  No new myalgias or GI upset on pravastatin (Pravachol).        Lab Results   Component Value Date    LABA1C 6.5 03/29/2021    LABA1C 5.1 11/19/2020    LABA1C 5.5 09/18/2020     Lab Results   Component Value Date LABMICR <12.0 08/12/2016    CREATININE 0.8 01/05/2021     Lab Results   Component Value Date    ALT 30 12/30/2020    AST 20 12/30/2020     Lab Results   Component Value Date    CHOL 111 11/19/2020    TRIG 148 11/19/2020    HDL 34 11/19/2020    LDLCALC 47 11/19/2020          DVT BLE/ PE  pt is currently on Coumadin, unsure of dosage and schedule of medication, pt unable to clarify dosage, will obtain records from [facility] once d/c for further management of Coumadin. Pt has been cleared by Pulmonology-Dr. Cory Worthington but will need a referral to Hematology-Dr. Blanche Brown      Right Wrist Drop  Pt is currently receiving OT at [facility], he will need a referral today for OT once d/c from facility. Pt is compliant with wearing wrist brace      Chronic Left Shoulder Pain/Lumbar Radiculopathy  Pt is currently on Norco for chronic left shoulder and lumbar pain, OARRS report reviewed. He will need a referral to Pain Management today, Pt is requesting referral to facility in [place], pt does have fax number. He is also compliant with Lyrica for radiating pain to BLE, pt stated he is getting moderate relief of pain w/[place]      [address]:  Patient's past medical, surgical, social and/or family history reviewed, updated in chart, and are non-contributory (unless otherwise stated). Medications and allergies also reviewed and updated in chart. Review of Systems  Review of Systems   Constitutional: Negative for activity change, appetite change, chills, diaphoresis, fatigue, fever and unexpected weight change. HENT: Negative for congestion, ear discharge, ear pain, facial swelling, hearing loss, mouth sores, nosebleeds, postnasal drip, rhinorrhea, sinus pressure, sinus pain, sneezing, sore throat, tinnitus, trouble swallowing and voice change. Eyes: Negative for photophobia, pain, discharge, redness, itching and visual disturbance.    Respiratory: Negative for apnea, cough, choking, chest tightness, shortness of breath, wheezing and stridor. Immunized for Covid 19, Chronic PE   Cardiovascular: Positive for leg swelling (Chronic -d/t BLE DVT). Negative for chest pain and palpitations. Gastrointestinal: Negative for abdominal distention, abdominal pain, anal bleeding, blood in stool, constipation, diarrhea, nausea, rectal pain and vomiting. GI bleeding status post right hemicolectomy, cholecystectomy, small bowel resection, ileocolonic anastomosis, splenectomy and omentectomy   Endocrine: Negative for cold intolerance, heat intolerance, polydipsia, polyphagia and polyuria. Genitourinary: Negative for decreased urine volume, difficulty urinating, dysuria, enuresis, flank pain, frequency, hematuria and urgency. H/o Acute Kidney Failure   Musculoskeletal: Negative for myalgias. H/o Chronic Lumbar Radiculopathy/ Left Shoulder Pain-rotator cuff injury   Skin: Negative for color change, pallor, rash and wound. Neurological: Negative for dizziness, tremors, seizures, syncope, facial asymmetry, speech difficulty, weakness, light-headedness, numbness and headaches. H/o Neuropathic pain d/t lumbar radiculopathy   Hematological: Negative for adenopathy. Does not bruise/bleed easily. Psychiatric/Behavioral: Negative for dysphoric mood, self-injury and suicidal ideas. The patient is not nervous/anxious. H/o Insomnia       Physical Exam:    VS:  /88 (Site: Right Upper Arm, Position: Sitting, Cuff Size: Large Adult)   Pulse 102   Temp 98.3 °F (36.8 °C) (Temporal)   Resp 18   Ht 6' 1\" (1.854 m)   Wt (!) 311 lb 3.2 oz (141.2 kg)   SpO2 99%   BMI 41.06 kg/m²   LAST WEIGHT:  Wt Readings from Last 3 Encounters:   03/29/21 (!) 311 lb 3.2 oz (141.2 kg)   01/01/21 290 lb 2 oz (131.6 kg)   11/18/20 237 lb (107.5 kg)     Physical Exam  Vitals signs and nursing note reviewed. Constitutional:       General: He is not in acute distress. Appearance: Normal appearance.  He Sensation: 5 sites tested. 5 sites sensed. Skin integrity: Callus and dry skin present. No ulcer, blister, skin breakdown, erythema or warmth. Left foot:      Protective Sensation: 5 sites tested. 5 sites sensed. Skin integrity: Callus and dry skin present. No ulcer, blister, skin breakdown, erythema or warmth. Lymphadenopathy:      Cervical: No cervical adenopathy. Skin:     General: Skin is warm and dry. Coloration: Skin is not jaundiced or pale. Findings: No bruising, erythema, lesion or rash. Neurological:      Mental Status: He is alert and oriented to person, place, and time. Sensory: No sensory deficit. Motor: Weakness (Right wrist-drop) present. Coordination: Coordination normal.      Gait: Gait normal.      Deep Tendon Reflexes: Reflexes normal.   Psychiatric:         Mood and Affect: Mood normal.         Behavior: Behavior normal.         Thought Content: Thought content normal.         Judgment: Judgment normal.               Assessment / Plan:      Zeb Soliz was seen today for follow-up from hospital, diabetes, hypertension, hyperlipidemia, pain, other and other. Diagnoses and all orders for this visit:    Type 2 diabetes mellitus without complication, without long-term current use of insulin (HCC)  Continue current treatment plan at this time  -     POCT glycosylated hemoglobin (Hb A1C)  -     CBC Auto Differential; Future  -     Comprehensive Metabolic Panel; Future  -     Lipid Panel; Future  -     TSH without Reflex; Future    Essential hypertension-stable/controlled  Continue current treatment plan  -     CBC Auto Differential; Future  -     Comprehensive Metabolic Panel; Future  -     Lipid Panel; Future  -     TSH without Reflex; Future    Mixed hyperlipidemia  Continue Pravachol and Lifestyle Modifications  -     CBC Auto Differential; Future  -     Comprehensive Metabolic Panel; Future  -     Lipid Panel;  Future  -     TSH without Reflex; Future    Deep vein thrombosis (DVT) of proximal vein of both lower extremities, unspecified chronicity (HCC)  Continue Coumadin as managed at assisted living facility  -     Faisal Britton MD, Hematology and Oncology, [place] ([facility])  -     [address] [facility]ication [facility] (Anticoagulation), [place]    Chronic pulmonary embolism, unspecified pulmonary embolism type, unspecified whether acute cor pulmonale present ([facility] [address]  As above  -   Faisal Britton MD, Hematology and Oncology, [place] ([facility])  -     [address] [facility]ication [facility] (Anticoagulation), [place]    Wrist drop, right wrist  -     [facility] - Occupational Therapy, [place]    Chronic left shoulder pain  Continue Norco and Lyrica  -     External Referral To Pain Clinic    Lumbar radiculopathy  As above  -     External Referral To Pain Clinic      Controlled Substance Monitoring:    Acute and Chronic Pain Monitoring:   RX Monitoring 3/30/2021   Attestation -   Acute Pain Prescriptions -   Periodic Controlled Substance Monitoring Possible medication side effects, risk of tolerance/dependence & alternative treatments discussed. ;Potential drug abuse or diversion identified, see note documentation.;Obtaining appropriate analgesic effect of treatment. Chronic Pain > 80 MEDD -          Call or go to ED immediately if symptoms worsen or persist.    Return in about 3 months (around 6/29/2021) for f/u DM/HTN/Hyperlipidemia. , or sooner if necessary. Educational materials and/or home exercises printed for patient's review and were included in patient instructions on his/her After Visit Summary and given to patient at the end of visit. Counseled regarding above diagnosis, including possible risks and complications,  especially if left uncontrolled.     Counseled regarding the possible side effects, risks, benefits and alternatives to treatment; patient and/or guardian verbalizes understanding, agrees, feels comfortable with and wishes to proceed with above treatment plan. Advised patient to call with any new medication issues, and read all Rx info from pharmacy to assure aware of all possible risks and side effects of medication before taking. Reviewed age and gender appropriate health screening exams and vaccinations. Advised patient regarding importance of keeping up with recommended health maintenance and to schedule as soon as possible if overdue, as this is important in assessing for undiagnosed pathology, especially cancer, as well as protecting against potentially harmful/life threatening disease. Patient and/or guardian verbalizes understanding and agrees with above counseling, assessment and plan. All questions answered.     Diandra Tucker, APRN - CNP

## 2021-03-29 NOTE — TELEPHONE ENCOUNTER
Left message for patient to contact office.     Patient to be scheduled for HFU with Dr. Chong Figures the week of 5/24/2021

## 2021-03-30 DIAGNOSIS — M25.512 CHRONIC LEFT SHOULDER PAIN: ICD-10-CM

## 2021-03-30 DIAGNOSIS — G89.29 CHRONIC LEFT SHOULDER PAIN: ICD-10-CM

## 2021-03-30 DIAGNOSIS — M79.2 NEUROPATHIC PAIN: ICD-10-CM

## 2021-03-30 DIAGNOSIS — M54.16 LUMBAR RADICULOPATHY: Primary | ICD-10-CM

## 2021-03-30 RX ORDER — HYDROCODONE BITARTRATE AND ACETAMINOPHEN 5; 325 MG/1; MG/1
1 TABLET ORAL 2 TIMES DAILY PRN
Qty: 60 TABLET | Refills: 0 | Status: SHIPPED | OUTPATIENT
Start: 2021-03-30 | End: 2021-04-29

## 2021-03-30 RX ORDER — PREGABALIN 150 MG/1
150 CAPSULE ORAL 2 TIMES DAILY
Qty: 60 CAPSULE | Refills: 0 | Status: SHIPPED
Start: 2021-03-30 | End: 2021-08-31

## 2021-04-01 ENCOUNTER — TELEPHONE (OUTPATIENT)
Dept: PHARMACY | Age: 46
End: 2021-04-01

## 2021-04-01 NOTE — TELEPHONE ENCOUNTER
Called and Left Message for Patient, Informing him that we have a Referral from Dr. Monica Alaniz and we need to Schedule and Initial Visit here at the [facility]. Left Message to Call back with our number.  Electronically signed by Marc Samaniego on 4/1/21 at 1:41 PM EDT

## 2021-04-05 ENCOUNTER — TELEPHONE (OUTPATIENT)
Dept: FAMILY MEDICINE CLINIC | Age: 46
End: 2021-04-05

## 2021-04-05 NOTE — TELEPHONE ENCOUNTER
Attempted to contact patient to come to office for INR check after being d/c'd from facility. Patient did not show for INR on Friday. Left voicemail for patient today that he needed to return call asap for INR check.  PCP notified

## 2021-04-06 ENCOUNTER — TELEPHONE (OUTPATIENT)
Dept: FAMILY MEDICINE CLINIC | Age: 46
End: 2021-04-06

## 2021-04-07 ENCOUNTER — ANTI-COAG VISIT (OUTPATIENT)
Dept: FAMILY MEDICINE CLINIC | Age: 46
End: 2021-04-07

## 2021-04-07 ENCOUNTER — NURSE ONLY (OUTPATIENT)
Dept: FAMILY MEDICINE CLINIC | Age: 46
End: 2021-04-07
Payer: MEDICAID

## 2021-04-07 DIAGNOSIS — I82.4Y2 ACUTE DEEP VEIN THROMBOSIS (DVT) OF PROXIMAL VEIN OF LEFT LOWER EXTREMITY (HCC): Primary | ICD-10-CM

## 2021-04-07 LAB
INTERNATIONAL NORMALIZATION RATIO, POC: 1.2
PROTHROMBIN TIME, POC: 14.6

## 2021-04-07 PROCEDURE — 93793 ANTICOAG MGMT PT WARFARIN: CPT | Performed by: NURSE PRACTITIONER

## 2021-04-07 PROCEDURE — 85610 PROTHROMBIN TIME: CPT | Performed by: NURSE PRACTITIONER

## 2021-04-07 RX ORDER — WARFARIN SODIUM 2.5 MG/1
TABLET ORAL
Qty: 60 TABLET | Refills: 1 | Status: SHIPPED
Start: 2021-04-07 | End: 2021-06-08

## 2021-04-07 RX ORDER — WARFARIN SODIUM 7.5 MG/1
7.5 TABLET ORAL DAILY
Qty: 30 TABLET | Refills: 3 | Status: CANCELLED | OUTPATIENT
Start: 2021-04-07

## 2021-04-07 NOTE — TELEPHONE ENCOUNTER
Patient called and left message on VM. Returned patient's call and left message on VM requesting call back to schedule an appt.     Electronically signed by Sarita Carbone on 4/7/21 at 4:29 PM EDT

## 2021-04-07 NOTE — PROGRESS NOTES
Goal INR as advised by Hematology-Dr. Eugenio Barrow is 2.0 to 3.0, INR today is 1.2.  Pt will take 2 tabs on Wednesday and Thursday and 1 tab all other days, current Coumadin dosage on hand is 2.5 mg

## 2021-04-12 ENCOUNTER — ANTI-COAG VISIT (OUTPATIENT)
Dept: FAMILY MEDICINE CLINIC | Age: 46
End: 2021-04-12
Payer: MEDICAID

## 2021-04-12 ENCOUNTER — NURSE ONLY (OUTPATIENT)
Dept: FAMILY MEDICINE CLINIC | Age: 46
End: 2021-04-12
Payer: MEDICAID

## 2021-04-12 DIAGNOSIS — Z79.01 ENCOUNTER FOR CURRENT LONG-TERM USE OF ANTICOAGULANTS: ICD-10-CM

## 2021-04-12 DIAGNOSIS — I82.4Y2 ACUTE DEEP VEIN THROMBOSIS (DVT) OF PROXIMAL VEIN OF LEFT LOWER EXTREMITY (HCC): Primary | ICD-10-CM

## 2021-04-12 LAB
INTERNATIONAL NORMALIZATION RATIO, POC: 1.3
PROTHROMBIN TIME, POC: 16

## 2021-04-12 PROCEDURE — 93793 ANTICOAG MGMT PT WARFARIN: CPT | Performed by: NURSE PRACTITIONER

## 2021-04-12 PROCEDURE — 85610 PROTHROMBIN TIME: CPT | Performed by: NURSE PRACTITIONER

## 2021-04-13 RX ORDER — IBUPROFEN 600 MG/1
600 TABLET ORAL EVERY 6 HOURS PRN
Qty: 120 TABLET | OUTPATIENT
Start: 2021-04-13

## 2021-04-13 RX ORDER — LOPERAMIDE HYDROCHLORIDE 2 MG/1
2 CAPSULE ORAL 4 TIMES DAILY PRN
Qty: 30 CAPSULE | Refills: 5 | Status: CANCELLED | OUTPATIENT
Start: 2021-04-13

## 2021-04-13 NOTE — TELEPHONE ENCOUNTER
Pt left a message and is asking for refills on his Flonase , Ibuprofen , and medication for Diarrhea

## 2021-04-16 NOTE — TELEPHONE ENCOUNTER
Called and left message on patient's VM stating we received a referral and were calling to schedule an appt @ [facility]. I stated we have attempted to reach him since April 1 st. Requested a call back to either schedule an appt or to please let us know if someone else is managing his therapy.     Electronically signed by Lorraine Osman on 4/16/21 at 1:32 PM EDT

## 2021-04-19 ENCOUNTER — NURSE ONLY (OUTPATIENT)
Dept: FAMILY MEDICINE CLINIC | Age: 46
End: 2021-04-19
Payer: MEDICAID

## 2021-04-19 ENCOUNTER — ANTI-COAG VISIT (OUTPATIENT)
Dept: FAMILY MEDICINE CLINIC | Age: 46
End: 2021-04-19

## 2021-04-19 DIAGNOSIS — Z79.01 ENCOUNTER FOR CURRENT LONG-TERM USE OF ANTICOAGULANTS: Primary | ICD-10-CM

## 2021-04-19 LAB
INTERNATIONAL NORMALIZATION RATIO, POC: 2.7
PROTHROMBIN TIME, POC: 32.8

## 2021-04-19 PROCEDURE — 85610 PROTHROMBIN TIME: CPT | Performed by: NURSE PRACTITIONER

## 2021-04-19 RX ORDER — LOPERAMIDE HYDROCHLORIDE 2 MG/1
2 CAPSULE ORAL 4 TIMES DAILY PRN
Qty: 120 CAPSULE | Refills: 3 | Status: SHIPPED
Start: 2021-04-19 | End: 2022-01-10

## 2021-04-19 NOTE — TELEPHONE ENCOUNTER
Pt requesting referral to Dr Tono Esqueda for pain medicine [phone], [facility]

## 2021-04-19 NOTE — PROGRESS NOTES
Pt was suppose to be taking Coumadin 5 mg every day except for Saturday and Sunday in which he should have been taking 2.5 mg daily, INR today is 2.7, he will continue current schedule and return for repeat INR in 1 week

## 2021-04-23 ENCOUNTER — APPOINTMENT (OUTPATIENT)
Dept: GENERAL RADIOLOGY | Age: 46
End: 2021-04-23
Payer: MEDICAID

## 2021-04-23 ENCOUNTER — HOSPITAL ENCOUNTER (EMERGENCY)
Age: 46
Discharge: HOME | End: 2021-04-24
Attending: EMERGENCY MEDICINE
Payer: MEDICAID

## 2021-04-23 VITALS
OXYGEN SATURATION: 95 % | TEMPERATURE: 97.8 F | HEART RATE: 105 BPM | DIASTOLIC BLOOD PRESSURE: 53 MMHG | SYSTOLIC BLOOD PRESSURE: 105 MMHG | RESPIRATION RATE: 16 BRPM

## 2021-04-23 DIAGNOSIS — N17.9 AKI (ACUTE KIDNEY INJURY) (HCC): ICD-10-CM

## 2021-04-23 DIAGNOSIS — R41.82 ALTERED MENTAL STATUS, UNSPECIFIED ALTERED MENTAL STATUS TYPE: Primary | ICD-10-CM

## 2021-04-23 LAB
ACETAMINOPHEN LEVEL: <5 MCG/ML (ref 10–30)
ALBUMIN SERPL-MCNC: 4.4 G/DL (ref 3.5–5.2)
ALP BLD-CCNC: 171 U/L (ref 40–129)
ALT SERPL-CCNC: 45 U/L (ref 0–40)
ANION GAP SERPL CALCULATED.3IONS-SCNC: 13 MMOL/L (ref 7–16)
AST SERPL-CCNC: 46 U/L (ref 0–39)
BASOPHILS ABSOLUTE: 0.13 E9/L (ref 0–0.2)
BASOPHILS RELATIVE PERCENT: 0.6 % (ref 0–2)
BILIRUB SERPL-MCNC: <0.2 MG/DL (ref 0–1.2)
BUN BLDV-MCNC: 16 MG/DL (ref 6–20)
CALCIUM SERPL-MCNC: 9.3 MG/DL (ref 8.6–10.2)
CHLORIDE BLD-SCNC: 101 MMOL/L (ref 98–107)
CO2: 25 MMOL/L (ref 22–29)
CREAT SERPL-MCNC: 1.7 MG/DL (ref 0.7–1.2)
EOSINOPHILS ABSOLUTE: 0.01 E9/L (ref 0.05–0.5)
EOSINOPHILS RELATIVE PERCENT: 0 % (ref 0–6)
ETHANOL: <10 MG/DL (ref 0–0.08)
GFR AFRICAN AMERICAN: 52
GFR NON-AFRICAN AMERICAN: 43 ML/MIN/1.73
GLUCOSE BLD-MCNC: 188 MG/DL (ref 74–99)
HCT VFR BLD CALC: 43.4 % (ref 37–54)
HEMOGLOBIN: 14.5 G/DL (ref 12.5–16.5)
IMMATURE GRANULOCYTES #: 0.19 E9/L
IMMATURE GRANULOCYTES %: 0.9 % (ref 0–5)
INR BLD: 6
LACTIC ACID: 3.4 MMOL/L (ref 0.5–2.2)
LYMPHOCYTES ABSOLUTE: 1.14 E9/L (ref 1.5–4)
LYMPHOCYTES RELATIVE PERCENT: 5.5 % (ref 20–42)
MCH RBC QN AUTO: 36.5 PG (ref 26–35)
MCHC RBC AUTO-ENTMCNC: 33.4 % (ref 32–34.5)
MCV RBC AUTO: 109.3 FL (ref 80–99.9)
MONOCYTES ABSOLUTE: 1.25 E9/L (ref 0.1–0.95)
MONOCYTES RELATIVE PERCENT: 6.1 % (ref 2–12)
NEUTROPHILS ABSOLUTE: 17.83 E9/L (ref 1.8–7.3)
NEUTROPHILS RELATIVE PERCENT: 86.9 % (ref 43–80)
PDW BLD-RTO: 13.4 FL (ref 11.5–15)
PLATELET # BLD: 535 E9/L (ref 130–450)
PMV BLD AUTO: 9.2 FL (ref 7–12)
POTASSIUM REFLEX MAGNESIUM: 5 MMOL/L (ref 3.5–5)
PROTHROMBIN TIME: 67.4 SEC (ref 9.3–12.4)
RBC # BLD: 3.97 E12/L (ref 3.8–5.8)
SALICYLATE, SERUM: <0.3 MG/DL (ref 0–30)
SODIUM BLD-SCNC: 139 MMOL/L (ref 132–146)
TOTAL PROTEIN: 7.5 G/DL (ref 6.4–8.3)
TRICYCLIC ANTIDEPRESSANTS SCREEN SERUM: NEGATIVE NG/ML
TROPONIN: <0.01 NG/ML (ref 0–0.03)
WBC # BLD: 20.6 E9/L (ref 4.5–11.5)

## 2021-04-23 PROCEDURE — 80143 DRUG ASSAY ACETAMINOPHEN: CPT

## 2021-04-23 PROCEDURE — 99283 EMERGENCY DEPT VISIT LOW MDM: CPT

## 2021-04-23 PROCEDURE — 83605 ASSAY OF LACTIC ACID: CPT

## 2021-04-23 PROCEDURE — 84484 ASSAY OF TROPONIN QUANT: CPT

## 2021-04-23 PROCEDURE — 80307 DRUG TEST PRSMV CHEM ANLYZR: CPT

## 2021-04-23 PROCEDURE — 80179 DRUG ASSAY SALICYLATE: CPT

## 2021-04-23 PROCEDURE — 85610 PROTHROMBIN TIME: CPT

## 2021-04-23 PROCEDURE — 80053 COMPREHEN METABOLIC PANEL: CPT

## 2021-04-23 PROCEDURE — 85025 COMPLETE CBC W/AUTO DIFF WBC: CPT

## 2021-04-23 PROCEDURE — 93005 ELECTROCARDIOGRAM TRACING: CPT | Performed by: EMERGENCY MEDICINE

## 2021-04-23 PROCEDURE — 2580000003 HC RX 258: Performed by: EMERGENCY MEDICINE

## 2021-04-23 PROCEDURE — 82077 ASSAY SPEC XCP UR&BREATH IA: CPT

## 2021-04-23 PROCEDURE — 81001 URINALYSIS AUTO W/SCOPE: CPT

## 2021-04-23 PROCEDURE — 71045 X-RAY EXAM CHEST 1 VIEW: CPT

## 2021-04-23 RX ORDER — 0.9 % SODIUM CHLORIDE 0.9 %
1000 INTRAVENOUS SOLUTION INTRAVENOUS ONCE
Status: COMPLETED | OUTPATIENT
Start: 2021-04-23 | End: 2021-04-24

## 2021-04-23 RX ADMIN — SODIUM CHLORIDE 1000 ML: 9 INJECTION, SOLUTION INTRAVENOUS at 21:28

## 2021-04-23 ASSESSMENT — ENCOUNTER SYMPTOMS
COUGH: 0
RHINORRHEA: 0
VOMITING: 0
DIARRHEA: 0
BLOOD IN STOOL: 0
COLOR CHANGE: 0
SHORTNESS OF BREATH: 0
ABDOMINAL PAIN: 0
TROUBLE SWALLOWING: 0
NAUSEA: 0

## 2021-04-23 NOTE — ED PROVIDER NOTES
THROMBECTOMY / EMBOLECTOMY FEMORAL Left 1/2/2021    LEFT LOWER EXTREMITY VENOGRAM performed by [address], MD at [address] Right 10/31/2016    Total right hip  Sobeida Zarate MD    UPPER GASTROINTESTINAL ENDOSCOPY N/A 9/4/2020    EGD DIAGNOSTIC ONLY performed by Abelardo Rojas MD at [address] Left 1/3/2021    LEFT LOWER EXTREMITY VENOGRAM, PLACEMENT OF NEW LYSIS CATHETER performed by [address], MD at [facility]  2007    LEFT WRIST       Social History:   Social History     Socioeconomic History    Marital status:      Spouse name: None    Number of children: None    Years of education: None    Highest education level: None   Occupational History    Occupation: disabled from     Social Needs    Financial resource strain: None    Food insecurity     Worry: None     Inability: None    Transportation needs     Medical: None     Non-medical: None   Tobacco Use    Smoking status: Never Smoker    Smokeless tobacco: Current User     Types: Chew   Substance and Sexual Activity    Alcohol use: Not Currently     Alcohol/week: 0.0 standard drinks     Frequency: Monthly or less    Drug use: No    Sexual activity: Not Currently   Lifestyle    Physical activity     Days per week: None     Minutes per session: None    Stress: None   Relationships    Social connections     Talks on phone: None     Gets together: None     Attends Latter-day service: None     Active member of club or organization: None     Attends meetings of clubs or organizations: None     Relationship status: None    Intimate partner violence     Fear of current or ex partner: None     Emotionally abused: None     Physically abused: None     Forced sexual activity: None   Other Topics Concern    None   Social History Narrative    None       Family History:   Family History   Problem Relation Age of Onset    Hypertension Mother     Arthritis Father     Diabetes Father     Cancer Maternal Grandfather         Skin    Cancer Paternal Uncle         skin    Diabetes Paternal Grandfather     Heart Disease Maternal Grandmother     Stroke Maternal Aunt        The patients home medications have been reviewed. Allergies: Allergies   Allergen Reactions    Seasonal      HAYFEVER / sneezing,watery eyes    Tramadol Itching           ---------------------------------------------------PHYSICAL EXAM--------------------------------------    BP (!) 95/59   Pulse 105   Temp 97.8 °F (36.6 °C) (Temporal)   Resp 16   SpO2 97%     Physical Exam  Vitals signs and nursing note reviewed. Constitutional:       General: He is not in acute distress. Appearance: He is not toxic-appearing. Comments: Somnolent, arouses to voice   HENT:      Mouth/Throat:      Mouth: Mucous membranes are dry. Eyes:      General: No scleral icterus. Extraocular Movements: Extraocular movements intact. Pupils: Pupils are equal, round, and reactive to light. Neck:      Musculoskeletal: Normal range of motion and neck supple. No neck rigidity or muscular tenderness. Cardiovascular:      Rate and Rhythm: Regular rhythm. Tachycardia present. Pulses: Normal pulses. Heart sounds: Normal heart sounds. No murmur. Pulmonary:      Effort: Pulmonary effort is normal. No respiratory distress. Breath sounds: Normal breath sounds. No wheezing or rales. Abdominal:      General: There is no distension. Palpations: Abdomen is soft. Tenderness: There is no abdominal tenderness. There is no guarding or rebound. Musculoskeletal: Normal range of motion. General: No swelling or tenderness. Comments: Radial, DP, and PT pulses intact bilaterally. Skin:     General: Skin is warm and dry. Comments: Well healing sacral wound   Neurological:      Mental Status: He is oriented to person, place, and time. Comments: Strength 5/5 and sensation grossly intact to light touch and equal bilaterally throughout all extremities          -------------------------------------------------- RESULTS -------------------------------------------------  I have personally reviewed all laboratory and imaging results for this patient. Results are listed below.      LABS:  Labs Reviewed   CBC WITH AUTO DIFFERENTIAL - Abnormal; Notable for the following components:       Result Value    WBC 20.6 (*)     .3 (*)     MCH 36.5 (*)     Platelets 106 (*)     Neutrophils % 86.9 (*)     Lymphocytes % 5.5 (*)     Neutrophils Absolute 17.83 (*)     Lymphocytes Absolute 1.14 (*)     Monocytes Absolute 1.25 (*)     Eosinophils Absolute 0.01 (*)     All other components within normal limits   COMPREHENSIVE METABOLIC PANEL W/ REFLEX TO MG FOR LOW K - Abnormal; Notable for the following components:    Glucose 188 (*)     CREATININE 1.7 (*)     Alkaline Phosphatase 171 (*)     ALT 45 (*)     AST 46 (*)     All other components within normal limits   LACTIC ACID, PLASMA - Abnormal; Notable for the following components:    Lactic Acid 3.4 (*)     All other components within normal limits   SERUM DRUG SCREEN - Abnormal; Notable for the following components:    Acetaminophen Level <5.0 (*)     All other components within normal limits   URINE DRUG SCREEN - Abnormal; Notable for the following components:    Opiate Scrn, Ur POSITIVE (*)     FENTANYL SCREEN, URINE POSITIVE (*)     All other components within normal limits   PROTIME-INR - Abnormal; Notable for the following components:    Protime 67.4 (*)     INR 6.0 (*)     All other components within normal limits    Narrative:     CALL  [place]  H34 tel. ,  Coag results called to and read back by Kai Quispe RN, 04/23/2021 21:29,  by [address] - Abnormal; Notable for the following components:    Ketones, Urine TRACE (*)     Protein,  (*)     Fine Casts, UA 6-10 (*) Bacteria, UA RARE (*)     Crystals, UA Few (*)     All other components within normal limits   TROPONIN       RADIOLOGY:  Interpreted personally and by Radiologist.  XR CHEST PORTABLE   Final Result   No acute process. EKG:  This EKG is signed and interpreted by the EP. Normal sinus rhythm, vent rate 99bpm, normal axis and intervals, no acute injury pattern, early repolarization vs pericarditis pattern, appears grossly similar to prior EKG    ------------------------- NURSING NOTES AND VITALS REVIEWED ---------------------------   The nursing notes within the ED encounter and vital signs as below have been reviewed by myself. BP (!) 95/59   Pulse 105   Temp 97.8 °F (36.6 °C) (Temporal)   Resp 16   SpO2 97%   Oxygen Saturation Interpretation: Normal    The patients available past medical records and past encounters were reviewed. ------------------------------ ED COURSE/MEDICAL DECISION MAKING----------------------  Medications   0.9 % sodium chloride bolus (0 mLs Intravenous Stopped 21 0103)     Counseling: The emergency provider has spoken with the patient and discussed todays results, in addition to providing specific details for the plan of care and counseling regarding the diagnosis and prognosis. Questions are answered at this time and they are agreeable with the plan. ED Course/Medical Decision Makin y.o. male here with altered mental status. On chronic narcotic pain meds at home and took extra muscle relaxants today. Non-toxic appearing, afebrile, hemodynamically stable, and in no acute distress. Mild hypotension on arrival improving w/ IV fluids. nonfocal neuro exam. No chest pain, shortness of breath, nausea, vomiting, diaphoresis. Low suspicion for acute cardiopulmonary pathology. Workup notable for SIMÓN, leukocytosis. No obvious source of infection, has well healing sacral and foot wounds. CXR and UA not suggestive of infection.  On reevaluation continues to feel well, ambulating w/o difficulty. After discussion of findings and return precautions, patient agrees with plan for discharge and outpatient follow up with PCP.       --------------------------------- IMPRESSION AND DISPOSITION ---------------------------------    IMPRESSION  1. Altered mental status, unspecified altered mental status type    2. SIMÓN (acute kidney injury) ([facility] [address]        DISPOSITION  Disposition: Discharge to home  Patient condition is good    NOTE: This report was transcribed using voice recognition software.  Every effort was made to ensure accuracy; however, inadvertent computerized transcription errors may be present    Kleber Walden MD  Attending Emergency Physician          Kleber Walden MD  04/24/21 0694

## 2021-04-24 LAB
AMORPHOUS: ABNORMAL
AMPHETAMINE SCREEN, URINE: NOT DETECTED
BACTERIA: ABNORMAL /HPF
BARBITURATE SCREEN URINE: NOT DETECTED
BENZODIAZEPINE SCREEN, URINE: NOT DETECTED
BILIRUBIN URINE: NEGATIVE
BLOOD, URINE: NEGATIVE
CANNABINOID SCREEN URINE: NOT DETECTED
CLARITY: CLEAR
COCAINE METABOLITE SCREEN URINE: NOT DETECTED
COLOR: YELLOW
CRYSTALS, UA: ABNORMAL /HPF
FENTANYL SCREEN, URINE: POSITIVE
FINE CASTS, UA: ABNORMAL /LPF (ref 0–2)
GLUCOSE URINE: NEGATIVE MG/DL
HYALINE CASTS: ABNORMAL /LPF (ref 0–2)
KETONES, URINE: ABNORMAL MG/DL
LEUKOCYTE ESTERASE, URINE: NEGATIVE
Lab: ABNORMAL
METHADONE SCREEN, URINE: NOT DETECTED
NITRITE, URINE: NEGATIVE
OPIATE SCREEN URINE: POSITIVE
OXYCODONE URINE: NOT DETECTED
PH UA: 5.5 (ref 5–9)
PHENCYCLIDINE SCREEN URINE: NOT DETECTED
PROTEIN UA: 100 MG/DL
RBC UA: ABNORMAL /HPF (ref 0–2)
SPECIFIC GRAVITY UA: >=1.03 (ref 1–1.03)
UROBILINOGEN, URINE: 0.2 E.U./DL
WBC UA: ABNORMAL /HPF (ref 0–5)

## 2021-04-25 LAB
EKG ATRIAL RATE: 99 BPM
EKG P AXIS: 53 DEGREES
EKG P-R INTERVAL: 170 MS
EKG Q-T INTERVAL: 338 MS
EKG QRS DURATION: 92 MS
EKG QTC CALCULATION (BAZETT): 433 MS
EKG R AXIS: 68 DEGREES
EKG T AXIS: 58 DEGREES
EKG VENTRICULAR RATE: 99 BPM

## 2021-04-25 PROCEDURE — 93010 ELECTROCARDIOGRAM REPORT: CPT | Performed by: INTERNAL MEDICINE

## 2021-04-26 ENCOUNTER — ANTI-COAG VISIT (OUTPATIENT)
Dept: FAMILY MEDICINE CLINIC | Age: 46
End: 2021-04-26
Payer: MEDICAID

## 2021-04-26 ENCOUNTER — NURSE ONLY (OUTPATIENT)
Dept: FAMILY MEDICINE CLINIC | Age: 46
End: 2021-04-26
Payer: MEDICAID

## 2021-04-26 DIAGNOSIS — I82.4Y2 ACUTE DEEP VEIN THROMBOSIS (DVT) OF PROXIMAL VEIN OF LEFT LOWER EXTREMITY (HCC): ICD-10-CM

## 2021-04-26 DIAGNOSIS — I82.4Y2 ACUTE DEEP VEIN THROMBOSIS (DVT) OF PROXIMAL VEIN OF LEFT LOWER EXTREMITY (HCC): Primary | ICD-10-CM

## 2021-04-26 LAB
INTERNATIONAL NORMALIZATION RATIO, POC: 1.7
PROTHROMBIN TIME, POC: 20.3

## 2021-04-26 PROCEDURE — 93793 ANTICOAG MGMT PT WARFARIN: CPT | Performed by: NURSE PRACTITIONER

## 2021-04-26 PROCEDURE — 85610 PROTHROMBIN TIME: CPT | Performed by: NURSE PRACTITIONER

## 2021-04-26 NOTE — PROGRESS NOTES
Patient here for INR check- was in the ER On Friday, INR was 6.0. Patient advised to hold coumadin until checked today. INR today is 1.7 and PT is 20.3. Patient advised to keep dosing the same (5mg daily, 2.5mg on SS).  Recheck in 1 week per Judy Chung

## 2021-04-26 NOTE — PROGRESS NOTES
Patient here for INR check- was in the ER On Friday, INR was 6.0. Patient advised to hold coumadin until checked today. INR today is 1.7 and PT is 20.3. Patient advised to keep dosing the same (5mg daily, 2.5mg on SS). Recheck in 1 week.

## 2021-04-30 NOTE — TELEPHONE ENCOUNTER
Called and left message on patient's VM stating we received a referral from [facility], to schedule an appt for the [facility]. Requested a call back to either schedule an appt, or just to let us know if someone else is managing his therapy.     Electronically signed by Jun Domínguez on 4/30/21 at 1:20 PM EDT

## 2021-05-03 ENCOUNTER — ANTI-COAG VISIT (OUTPATIENT)
Dept: FAMILY MEDICINE CLINIC | Age: 46
End: 2021-05-03
Payer: MEDICAID

## 2021-05-03 ENCOUNTER — NURSE ONLY (OUTPATIENT)
Dept: FAMILY MEDICINE CLINIC | Age: 46
End: 2021-05-03
Payer: MEDICAID

## 2021-05-03 DIAGNOSIS — I82.4Y2 ACUTE DEEP VEIN THROMBOSIS (DVT) OF PROXIMAL VEIN OF LEFT LOWER EXTREMITY (HCC): Primary | ICD-10-CM

## 2021-05-03 LAB
INTERNATIONAL NORMALIZATION RATIO, POC: 1
PROTHROMBIN TIME, POC: 12.6

## 2021-05-03 PROCEDURE — 93793 ANTICOAG MGMT PT WARFARIN: CPT | Performed by: NURSE PRACTITIONER

## 2021-05-03 PROCEDURE — 85610 PROTHROMBIN TIME: CPT | Performed by: NURSE PRACTITIONER

## 2021-05-03 RX ORDER — WARFARIN SODIUM 5 MG/1
5 TABLET ORAL DAILY
Qty: 30 TABLET | Refills: 5 | Status: SHIPPED
Start: 2021-05-03 | End: 2021-08-31

## 2021-05-03 NOTE — PROGRESS NOTES
Patient here for INR check - currently taking 2.5mg SS and 5mg M-F. Patient was advised to hold coumadin until his recheck today.     INR 1.0  PT 12.6

## 2021-05-10 ENCOUNTER — NURSE ONLY (OUTPATIENT)
Dept: FAMILY MEDICINE CLINIC | Age: 46
End: 2021-05-10
Payer: MEDICAID

## 2021-05-10 ENCOUNTER — ANTI-COAG VISIT (OUTPATIENT)
Dept: FAMILY MEDICINE CLINIC | Age: 46
End: 2021-05-10
Payer: MEDICAID

## 2021-05-10 DIAGNOSIS — Z79.01 ENCOUNTER FOR CURRENT LONG-TERM USE OF ANTICOAGULANTS: Primary | ICD-10-CM

## 2021-05-10 DIAGNOSIS — I82.403 DVT, LOWER EXTREMITY, RECURRENT, BILATERAL (HCC): ICD-10-CM

## 2021-05-10 LAB
INTERNATIONAL NORMALIZATION RATIO, POC: 2.9
PROTHROMBIN TIME, POC: 35.2

## 2021-05-10 PROCEDURE — 85610 PROTHROMBIN TIME: CPT | Performed by: NURSE PRACTITIONER

## 2021-05-10 PROCEDURE — 93793 ANTICOAG MGMT PT WARFARIN: CPT | Performed by: NURSE PRACTITIONER

## 2021-05-10 NOTE — PROGRESS NOTES
Pt states he takes coumadin 7.5mg all days except wed and Thursday he takes 10mg    Per PCP pt is to continue same dosage repeat INR in 1 week

## 2021-05-12 NOTE — TELEPHONE ENCOUNTER
[facility] appears to be managing warfarin per notes in Epic. I would file referral away as patient has not returned calls.    Yadi Beaulieu PharmD 5/12/2021 11:23 AM

## 2021-05-17 ENCOUNTER — ANTI-COAG VISIT (OUTPATIENT)
Dept: FAMILY MEDICINE CLINIC | Age: 46
End: 2021-05-17

## 2021-05-17 ENCOUNTER — NURSE ONLY (OUTPATIENT)
Dept: FAMILY MEDICINE CLINIC | Age: 46
End: 2021-05-17
Payer: MEDICAID

## 2021-05-17 DIAGNOSIS — Z79.01 ENCOUNTER FOR CURRENT LONG-TERM USE OF ANTICOAGULANTS: Primary | ICD-10-CM

## 2021-05-17 LAB
INTERNATIONAL NORMALIZATION RATIO, POC: 7.9
PROTHROMBIN TIME, POC: 95.3

## 2021-05-17 PROCEDURE — 85610 PROTHROMBIN TIME: CPT | Performed by: NURSE PRACTITIONER

## 2021-05-17 RX ORDER — INSULIN GLARGINE 100 [IU]/ML
25 INJECTION, SOLUTION SUBCUTANEOUS DAILY
Qty: 1 VIAL | Refills: 3 | Status: SHIPPED
Start: 2021-05-17 | End: 2021-08-31

## 2021-05-19 ENCOUNTER — NURSE ONLY (OUTPATIENT)
Dept: FAMILY MEDICINE CLINIC | Age: 46
End: 2021-05-19
Payer: MEDICAID

## 2021-05-19 ENCOUNTER — ANTI-COAG VISIT (OUTPATIENT)
Dept: FAMILY MEDICINE CLINIC | Age: 46
End: 2021-05-19
Payer: MEDICAID

## 2021-05-19 DIAGNOSIS — Z79.01 ENCOUNTER FOR CURRENT LONG-TERM USE OF ANTICOAGULANTS: Primary | ICD-10-CM

## 2021-05-19 LAB
INTERNATIONAL NORMALIZATION RATIO, POC: 3.9
PROTHROMBIN TIME, POC: 46.6

## 2021-05-19 PROCEDURE — 85610 PROTHROMBIN TIME: CPT | Performed by: NURSE PRACTITIONER

## 2021-05-19 PROCEDURE — 93793 ANTICOAG MGMT PT WARFARIN: CPT | Performed by: NURSE PRACTITIONER

## 2021-05-19 NOTE — PROGRESS NOTES
Patient advised to hold dosing today and tomorrow, resume normal dosing on Friday and scheduled recheck on Monday per [facility]

## 2021-05-24 ENCOUNTER — TELEPHONE (OUTPATIENT)
Dept: FAMILY MEDICINE CLINIC | Age: 46
End: 2021-05-24

## 2021-05-24 ENCOUNTER — ANTI-COAG VISIT (OUTPATIENT)
Dept: FAMILY MEDICINE CLINIC | Age: 46
End: 2021-05-24

## 2021-05-24 ENCOUNTER — NURSE ONLY (OUTPATIENT)
Dept: FAMILY MEDICINE CLINIC | Age: 46
End: 2021-05-24
Payer: MEDICAID

## 2021-05-24 DIAGNOSIS — Z79.01 ENCOUNTER FOR CURRENT LONG-TERM USE OF ANTICOAGULANTS: Primary | ICD-10-CM

## 2021-05-24 LAB
INTERNATIONAL NORMALIZATION RATIO, POC: 1.6
PROTHROMBIN TIME, POC: 19.3

## 2021-05-24 PROCEDURE — 85610 PROTHROMBIN TIME: CPT | Performed by: NURSE PRACTITIONER

## 2021-06-07 ENCOUNTER — TELEPHONE (OUTPATIENT)
Dept: FAMILY MEDICINE CLINIC | Age: 46
End: 2021-06-07

## 2021-06-07 ENCOUNTER — NURSE ONLY (OUTPATIENT)
Dept: FAMILY MEDICINE CLINIC | Age: 46
End: 2021-06-07
Payer: MEDICAID

## 2021-06-07 ENCOUNTER — ANTI-COAG VISIT (OUTPATIENT)
Dept: FAMILY MEDICINE CLINIC | Age: 46
End: 2021-06-07
Payer: MEDICAID

## 2021-06-07 DIAGNOSIS — Z79.01 ENCOUNTER FOR CURRENT LONG-TERM USE OF ANTICOAGULANTS: Primary | ICD-10-CM

## 2021-06-07 LAB
INTERNATIONAL NORMALIZATION RATIO, POC: 3.2
PROTHROMBIN TIME, POC: 38

## 2021-06-07 PROCEDURE — 85610 PROTHROMBIN TIME: CPT | Performed by: NURSE PRACTITIONER

## 2021-06-07 PROCEDURE — 93793 ANTICOAG MGMT PT WARFARIN: CPT | Performed by: NURSE PRACTITIONER

## 2021-06-07 NOTE — PROGRESS NOTES
Patient here for 1 week INR check - currently taking 7.5mg daily. INR 3.2  PT 38.0    Patient advised to keep dosing the same and repeat in 2 weeks per pcp.

## 2021-06-08 DIAGNOSIS — E11.9 TYPE 2 DIABETES MELLITUS WITHOUT COMPLICATION, WITHOUT LONG-TERM CURRENT USE OF INSULIN (HCC): Primary | ICD-10-CM

## 2021-06-08 RX ORDER — LANCETS 30 GAUGE
EACH MISCELLANEOUS
Qty: 100 EACH | Refills: 3 | Status: SHIPPED
Start: 2021-06-08 | End: 2021-08-31

## 2021-06-08 RX ORDER — BLOOD SUGAR DIAGNOSTIC
STRIP MISCELLANEOUS
Qty: 100 EACH | Refills: 3 | Status: SHIPPED
Start: 2021-06-08 | End: 2021-08-31

## 2021-06-08 RX ORDER — WARFARIN SODIUM 2.5 MG/1
TABLET ORAL
Qty: 60 TABLET | Refills: 1 | Status: SHIPPED
Start: 2021-06-08 | End: 2021-08-31

## 2021-06-08 NOTE — TELEPHONE ENCOUNTER
Confirmed with patient at INR appointment yesterday that he is taking 7.5mg and will need the 2.5mg script.  Does not need now though- has 1 refill left that he was picking up after leaving here, [id number]

## 2021-06-28 RX ORDER — LISINOPRIL 5 MG/1
5 TABLET ORAL DAILY
Qty: 90 TABLET | Refills: 1 | Status: SHIPPED
Start: 2021-06-28 | End: 2021-09-20

## 2021-08-30 ENCOUNTER — APPOINTMENT (OUTPATIENT)
Dept: GENERAL RADIOLOGY | Age: 46
DRG: 383 | End: 2021-08-30
Payer: MEDICARE

## 2021-08-30 ENCOUNTER — APPOINTMENT (OUTPATIENT)
Dept: ULTRASOUND IMAGING | Age: 46
DRG: 383 | End: 2021-08-30
Payer: MEDICARE

## 2021-08-30 LAB
ALBUMIN SERPL-MCNC: 3.8 G/DL (ref 3.5–5.2)
ALP BLD-CCNC: 177 U/L (ref 40–129)
ALT SERPL-CCNC: 47 U/L (ref 0–40)
ANION GAP SERPL CALCULATED.3IONS-SCNC: 9 MMOL/L (ref 7–16)
APTT: 35.7 SEC (ref 24.5–35.1)
AST SERPL-CCNC: 26 U/L (ref 0–39)
BASOPHILS ABSOLUTE: 0.09 E9/L (ref 0–0.2)
BASOPHILS RELATIVE PERCENT: 0.7 % (ref 0–2)
BILIRUB SERPL-MCNC: 0.2 MG/DL (ref 0–1.2)
BUN BLDV-MCNC: 9 MG/DL (ref 6–20)
CALCIUM SERPL-MCNC: 9.2 MG/DL (ref 8.6–10.2)
CHLORIDE BLD-SCNC: 103 MMOL/L (ref 98–107)
CO2: 28 MMOL/L (ref 22–29)
CREAT SERPL-MCNC: 0.8 MG/DL (ref 0.7–1.2)
EOSINOPHILS ABSOLUTE: 0.46 E9/L (ref 0.05–0.5)
EOSINOPHILS RELATIVE PERCENT: 3.8 % (ref 0–6)
GFR AFRICAN AMERICAN: >60
GFR NON-AFRICAN AMERICAN: >60 ML/MIN/1.73
GLUCOSE BLD-MCNC: 123 MG/DL (ref 74–99)
HCT VFR BLD CALC: 37 % (ref 37–54)
HEMOGLOBIN: 12.4 G/DL (ref 12.5–16.5)
IMMATURE GRANULOCYTES #: 0.04 E9/L
IMMATURE GRANULOCYTES %: 0.3 % (ref 0–5)
INR BLD: 1.6
LACTIC ACID: 1.5 MMOL/L (ref 0.5–2.2)
LYMPHOCYTES ABSOLUTE: 1.56 E9/L (ref 1.5–4)
LYMPHOCYTES RELATIVE PERCENT: 13 % (ref 20–42)
MCH RBC QN AUTO: 30 PG (ref 26–35)
MCHC RBC AUTO-ENTMCNC: 33.5 % (ref 32–34.5)
MCV RBC AUTO: 89.4 FL (ref 80–99.9)
MONOCYTES ABSOLUTE: 1.15 E9/L (ref 0.1–0.95)
MONOCYTES RELATIVE PERCENT: 9.6 % (ref 2–12)
NEUTROPHILS ABSOLUTE: 8.72 E9/L (ref 1.8–7.3)
NEUTROPHILS RELATIVE PERCENT: 72.6 % (ref 43–80)
PDW BLD-RTO: 14 FL (ref 11.5–15)
PLATELET # BLD: 651 E9/L (ref 130–450)
PMV BLD AUTO: 8.6 FL (ref 7–12)
POTASSIUM SERPL-SCNC: 3.4 MMOL/L (ref 3.5–5)
PRO-BNP: 27 PG/ML (ref 0–125)
PROTHROMBIN TIME: 17.6 SEC (ref 9.3–12.4)
RBC # BLD: 4.14 E12/L (ref 3.8–5.8)
SODIUM BLD-SCNC: 140 MMOL/L (ref 132–146)
TOTAL PROTEIN: 7.4 G/DL (ref 6.4–8.3)
TROPONIN, HIGH SENSITIVITY: 16 NG/L (ref 0–11)
WBC # BLD: 12 E9/L (ref 4.5–11.5)

## 2021-08-30 PROCEDURE — 85610 PROTHROMBIN TIME: CPT

## 2021-08-30 PROCEDURE — 93005 ELECTROCARDIOGRAM TRACING: CPT | Performed by: NURSE PRACTITIONER

## 2021-08-30 PROCEDURE — 93971 EXTREMITY STUDY: CPT | Performed by: RADIOLOGY

## 2021-08-30 PROCEDURE — 84484 ASSAY OF TROPONIN QUANT: CPT

## 2021-08-30 PROCEDURE — 99284 EMERGENCY DEPT VISIT MOD MDM: CPT

## 2021-08-30 PROCEDURE — 80053 COMPREHEN METABOLIC PANEL: CPT

## 2021-08-30 PROCEDURE — 83605 ASSAY OF LACTIC ACID: CPT

## 2021-08-30 PROCEDURE — 83880 ASSAY OF NATRIURETIC PEPTIDE: CPT

## 2021-08-30 PROCEDURE — 71046 X-RAY EXAM CHEST 2 VIEWS: CPT

## 2021-08-30 PROCEDURE — 93971 EXTREMITY STUDY: CPT

## 2021-08-30 PROCEDURE — 85730 THROMBOPLASTIN TIME PARTIAL: CPT

## 2021-08-30 PROCEDURE — 85025 COMPLETE CBC W/AUTO DIFF WBC: CPT

## 2021-08-30 ASSESSMENT — PAIN DESCRIPTION - PAIN TYPE: TYPE: ACUTE PAIN

## 2021-08-30 ASSESSMENT — PAIN DESCRIPTION - ORIENTATION: ORIENTATION: LEFT

## 2021-08-30 ASSESSMENT — PAIN SCALES - GENERAL: PAINLEVEL_OUTOF10: 7

## 2021-08-30 ASSESSMENT — PAIN DESCRIPTION - LOCATION: LOCATION: LEG

## 2021-08-31 ENCOUNTER — APPOINTMENT (OUTPATIENT)
Dept: CT IMAGING | Age: 46
DRG: 383 | End: 2021-08-31
Payer: MEDICARE

## 2021-08-31 ENCOUNTER — HOSPITAL ENCOUNTER (INPATIENT)
Age: 46
LOS: 5 days | Discharge: HOME | DRG: 383 | End: 2021-09-05
Attending: EMERGENCY MEDICINE | Admitting: FAMILY MEDICINE
Payer: MEDICARE

## 2021-08-31 DIAGNOSIS — I82.402 ACUTE DEEP VEIN THROMBOSIS (DVT) OF LEFT LOWER EXTREMITY, UNSPECIFIED VEIN (HCC): Primary | ICD-10-CM

## 2021-08-31 DIAGNOSIS — L03.116 CELLULITIS OF LEFT LOWER EXTREMITY: ICD-10-CM

## 2021-08-31 PROBLEM — I82.220 INFERIOR VENA CAVA OCCLUSION (HCC): Status: ACTIVE | Noted: 2021-08-31

## 2021-08-31 PROBLEM — T81.49XA CELLULITIS, WOUND, POST-OPERATIVE: Status: RESOLVED | Noted: 2019-06-30 | Resolved: 2021-08-31

## 2021-08-31 PROBLEM — U07.1 COVID-19 VIRUS INFECTION: Status: RESOLVED | Noted: 2020-10-09 | Resolved: 2021-08-31

## 2021-08-31 PROBLEM — N17.9 AKI (ACUTE KIDNEY INJURY) (HCC): Status: RESOLVED | Noted: 2020-09-17 | Resolved: 2021-08-31

## 2021-08-31 PROBLEM — R91.8 GROUND GLASS OPACITY PRESENT ON IMAGING OF LUNG: Status: RESOLVED | Noted: 2020-11-19 | Resolved: 2021-08-31

## 2021-08-31 PROBLEM — L03.90 CELLULITIS: Status: RESOLVED | Noted: 2020-11-18 | Resolved: 2021-08-31

## 2021-08-31 PROBLEM — B35.9 DERMATOPHYTOSIS: Status: ACTIVE | Noted: 2021-08-31

## 2021-08-31 PROBLEM — A41.9 SEPTICEMIA (HCC): Status: RESOLVED | Noted: 2020-09-17 | Resolved: 2021-08-31

## 2021-08-31 PROBLEM — L03.119 CELLULITIS OF FOOT: Status: RESOLVED | Noted: 2019-06-30 | Resolved: 2021-08-31

## 2021-08-31 PROBLEM — M54.9 BACK PAIN: Status: RESOLVED | Noted: 2020-11-19 | Resolved: 2021-08-31

## 2021-08-31 PROBLEM — K52.9 ENTEROCOLITIS: Status: RESOLVED | Noted: 2020-07-31 | Resolved: 2021-08-31

## 2021-08-31 PROBLEM — I82.403 DEEP VEIN THROMBOSIS (DVT) OF BOTH LOWER EXTREMITIES (HCC): Status: RESOLVED | Noted: 2020-11-19 | Resolved: 2021-08-31

## 2021-08-31 PROBLEM — I82.220 ACUTE THROMBOSIS OF INFERIOR VENA CAVA (HCC): Status: RESOLVED | Noted: 2020-10-10 | Resolved: 2021-08-31

## 2021-08-31 PROBLEM — S80.822A BLISTER OF LEFT LEG: Status: ACTIVE | Noted: 2021-08-31

## 2021-08-31 PROBLEM — Z86.718 HISTORY OF DEEP VEIN THROMBOSIS: Status: ACTIVE | Noted: 2021-08-31

## 2021-08-31 PROBLEM — D73.5 SPLENIC INFARCTION: Status: RESOLVED | Noted: 2020-08-01 | Resolved: 2021-08-31

## 2021-08-31 PROBLEM — I89.0 LYMPHEDEMA OF LEFT LEG: Status: ACTIVE | Noted: 2021-08-31

## 2021-08-31 PROBLEM — I82.403 DVT, LOWER EXTREMITY, RECURRENT, BILATERAL (HCC): Status: RESOLVED | Noted: 2020-12-31 | Resolved: 2021-08-31

## 2021-08-31 PROBLEM — D73.3 SPLENIC ABSCESS: Status: RESOLVED | Noted: 2020-08-01 | Resolved: 2021-08-31

## 2021-08-31 PROBLEM — B99.9 INTRA-ABDOMINAL INFECTION: Status: RESOLVED | Noted: 2020-09-17 | Resolved: 2021-08-31

## 2021-08-31 PROBLEM — L89.159 DECUBITUS ULCER OF SACRAL AREA: Status: RESOLVED | Noted: 2020-08-01 | Resolved: 2021-08-31

## 2021-08-31 PROBLEM — T14.8XXA SKIN WOUND FROM SURGICAL INCISION: Status: RESOLVED | Noted: 2020-08-13 | Resolved: 2021-08-31

## 2021-08-31 PROBLEM — R65.10 SIRS (SYSTEMIC INFLAMMATORY RESPONSE SYNDROME) (HCC): Status: RESOLVED | Noted: 2019-06-30 | Resolved: 2021-08-31

## 2021-08-31 PROBLEM — R79.1 SUBTHERAPEUTIC INTERNATIONAL NORMALIZED RATIO (INR): Status: ACTIVE | Noted: 2021-08-31

## 2021-08-31 PROBLEM — L03.319 CELLULITIS OF SACRAL REGION: Status: RESOLVED | Noted: 2019-07-02 | Resolved: 2021-08-31

## 2021-08-31 PROBLEM — K92.2 ACUTE GI BLEEDING: Status: RESOLVED | Noted: 2020-09-03 | Resolved: 2021-08-31

## 2021-08-31 PROBLEM — D64.9 ANEMIA: Status: RESOLVED | Noted: 2020-08-01 | Resolved: 2021-08-31

## 2021-08-31 PROBLEM — Z95.828 S/P IVC FILTER: Status: ACTIVE | Noted: 2021-08-31

## 2021-08-31 LAB
C-REACTIVE PROTEIN: 4 MG/DL (ref 0–0.4)
EKG ATRIAL RATE: 104 BPM
EKG P AXIS: 59 DEGREES
EKG P-R INTERVAL: 162 MS
EKG Q-T INTERVAL: 348 MS
EKG QRS DURATION: 102 MS
EKG QTC CALCULATION (BAZETT): 457 MS
EKG R AXIS: 78 DEGREES
EKG T AXIS: 13 DEGREES
EKG VENTRICULAR RATE: 104 BPM
LACTIC ACID, SEPSIS: 1.2 MMOL/L (ref 0.5–1.9)
LACTIC ACID, SEPSIS: 2.4 MMOL/L (ref 0.5–1.9)
METER GLUCOSE: 122 MG/DL (ref 74–99)
METER GLUCOSE: 135 MG/DL (ref 74–99)
METER GLUCOSE: 151 MG/DL (ref 74–99)
PROCALCITONIN: 0.05 NG/ML (ref 0–0.08)
PROCALCITONIN: 0.07 NG/ML (ref 0–0.08)

## 2021-08-31 PROCEDURE — 87186 SC STD MICRODIL/AGAR DIL: CPT

## 2021-08-31 PROCEDURE — 1200000000 HC SEMI PRIVATE

## 2021-08-31 PROCEDURE — 83605 ASSAY OF LACTIC ACID: CPT

## 2021-08-31 PROCEDURE — 86140 C-REACTIVE PROTEIN: CPT

## 2021-08-31 PROCEDURE — 6370000000 HC RX 637 (ALT 250 FOR IP): Performed by: SURGERY

## 2021-08-31 PROCEDURE — 2580000003 HC RX 258: Performed by: SPECIALIST

## 2021-08-31 PROCEDURE — 6360000004 HC RX CONTRAST MEDICATION: Performed by: RADIOLOGY

## 2021-08-31 PROCEDURE — 6360000002 HC RX W HCPCS: Performed by: SURGERY

## 2021-08-31 PROCEDURE — 87077 CULTURE AEROBIC IDENTIFY: CPT

## 2021-08-31 PROCEDURE — 99254 IP/OBS CNSLTJ NEW/EST MOD 60: CPT | Performed by: SURGERY

## 2021-08-31 PROCEDURE — 87150 DNA/RNA AMPLIFIED PROBE: CPT

## 2021-08-31 PROCEDURE — 85651 RBC SED RATE NONAUTOMATED: CPT

## 2021-08-31 PROCEDURE — 84145 PROCALCITONIN (PCT): CPT

## 2021-08-31 PROCEDURE — 82962 GLUCOSE BLOOD TEST: CPT

## 2021-08-31 PROCEDURE — 71275 CT ANGIOGRAPHY CHEST: CPT

## 2021-08-31 PROCEDURE — 6370000000 HC RX 637 (ALT 250 FOR IP): Performed by: NURSE PRACTITIONER

## 2021-08-31 PROCEDURE — 2580000003 HC RX 258: Performed by: NURSE PRACTITIONER

## 2021-08-31 PROCEDURE — 2500000003 HC RX 250 WO HCPCS: Performed by: SURGERY

## 2021-08-31 PROCEDURE — 2580000003 HC RX 258: Performed by: RADIOLOGY

## 2021-08-31 PROCEDURE — 87040 BLOOD CULTURE FOR BACTERIA: CPT

## 2021-08-31 PROCEDURE — 6360000002 HC RX W HCPCS: Performed by: SPECIALIST

## 2021-08-31 PROCEDURE — 36415 COLL VENOUS BLD VENIPUNCTURE: CPT

## 2021-08-31 RX ORDER — LANOLIN ALCOHOL/MO/W.PET/CERES
3 CREAM (GRAM) TOPICAL NIGHTLY PRN
Status: DISCONTINUED | OUTPATIENT
Start: 2021-08-31 | End: 2021-09-05 | Stop reason: HOSPADM

## 2021-08-31 RX ORDER — LISINOPRIL 10 MG/1
5 TABLET ORAL DAILY
Status: DISCONTINUED | OUTPATIENT
Start: 2021-08-31 | End: 2021-09-05 | Stop reason: HOSPADM

## 2021-08-31 RX ORDER — WARFARIN SODIUM 2.5 MG/1
2.5 TABLET ORAL EVERY EVENING
COMMUNITY
End: 2021-09-14 | Stop reason: SDUPTHER

## 2021-08-31 RX ORDER — PRAVASTATIN SODIUM 20 MG
20 TABLET ORAL NIGHTLY
Status: DISCONTINUED | OUTPATIENT
Start: 2021-08-31 | End: 2021-09-05 | Stop reason: HOSPADM

## 2021-08-31 RX ORDER — SODIUM CHLORIDE 0.9 % (FLUSH) 0.9 %
5-40 SYRINGE (ML) INJECTION EVERY 12 HOURS SCHEDULED
Status: DISCONTINUED | OUTPATIENT
Start: 2021-08-31 | End: 2021-09-03 | Stop reason: SDUPTHER

## 2021-08-31 RX ORDER — INSULIN GLARGINE 100 [IU]/ML
25 INJECTION, SOLUTION SUBCUTANEOUS NIGHTLY
Status: DISCONTINUED | OUTPATIENT
Start: 2021-08-31 | End: 2021-09-05 | Stop reason: HOSPADM

## 2021-08-31 RX ORDER — PANTOPRAZOLE SODIUM 40 MG/1
40 TABLET, DELAYED RELEASE ORAL DAILY
Status: DISCONTINUED | OUTPATIENT
Start: 2021-08-31 | End: 2021-09-05 | Stop reason: HOSPADM

## 2021-08-31 RX ORDER — LOPERAMIDE HYDROCHLORIDE 2 MG/1
2 CAPSULE ORAL 4 TIMES DAILY PRN
Status: DISCONTINUED | OUTPATIENT
Start: 2021-08-31 | End: 2021-09-05 | Stop reason: HOSPADM

## 2021-08-31 RX ORDER — CYCLOBENZAPRINE HCL 10 MG
10 TABLET ORAL 3 TIMES DAILY
Status: DISCONTINUED | OUTPATIENT
Start: 2021-08-31 | End: 2021-09-05 | Stop reason: HOSPADM

## 2021-08-31 RX ORDER — POLYETHYLENE GLYCOL 3350 17 G/17G
17 POWDER, FOR SOLUTION ORAL DAILY PRN
Status: DISCONTINUED | OUTPATIENT
Start: 2021-08-31 | End: 2021-09-05 | Stop reason: HOSPADM

## 2021-08-31 RX ORDER — HYDROXYUREA 500 MG/1
500 CAPSULE ORAL 2 TIMES DAILY
Status: DISCONTINUED | OUTPATIENT
Start: 2021-08-31 | End: 2021-09-05 | Stop reason: HOSPADM

## 2021-08-31 RX ORDER — MAGNESIUM SULFATE IN WATER 40 MG/ML
2000 INJECTION, SOLUTION INTRAVENOUS PRN
Status: DISCONTINUED | OUTPATIENT
Start: 2021-08-31 | End: 2021-09-05 | Stop reason: HOSPADM

## 2021-08-31 RX ORDER — NICOTINE POLACRILEX 4 MG
15 LOZENGE BUCCAL PRN
Status: DISCONTINUED | OUTPATIENT
Start: 2021-08-31 | End: 2021-09-05 | Stop reason: HOSPADM

## 2021-08-31 RX ORDER — ONDANSETRON 2 MG/ML
4 INJECTION INTRAMUSCULAR; INTRAVENOUS EVERY 6 HOURS PRN
Status: DISCONTINUED | OUTPATIENT
Start: 2021-08-31 | End: 2021-09-05 | Stop reason: HOSPADM

## 2021-08-31 RX ORDER — POTASSIUM CHLORIDE 7.45 MG/ML
10 INJECTION INTRAVENOUS PRN
Status: DISCONTINUED | OUTPATIENT
Start: 2021-08-31 | End: 2021-09-05 | Stop reason: HOSPADM

## 2021-08-31 RX ORDER — POTASSIUM CHLORIDE 20 MEQ/1
40 TABLET, EXTENDED RELEASE ORAL PRN
Status: DISCONTINUED | OUTPATIENT
Start: 2021-08-31 | End: 2021-09-05 | Stop reason: HOSPADM

## 2021-08-31 RX ORDER — WARFARIN SODIUM 5 MG/1
5 TABLET ORAL EVERY EVENING
COMMUNITY
End: 2022-02-14 | Stop reason: SDUPTHER

## 2021-08-31 RX ORDER — SODIUM CHLORIDE 9 MG/ML
25 INJECTION, SOLUTION INTRAVENOUS PRN
Status: DISCONTINUED | OUTPATIENT
Start: 2021-08-31 | End: 2021-09-03 | Stop reason: SDUPTHER

## 2021-08-31 RX ORDER — OXYCODONE HYDROCHLORIDE AND ACETAMINOPHEN 5; 325 MG/1; MG/1
1 TABLET ORAL EVERY 8 HOURS PRN
Status: DISCONTINUED | OUTPATIENT
Start: 2021-08-31 | End: 2021-09-01

## 2021-08-31 RX ORDER — FERROUS SULFATE 325(65) MG
325 TABLET ORAL 2 TIMES DAILY
Status: DISCONTINUED | OUTPATIENT
Start: 2021-08-31 | End: 2021-09-05 | Stop reason: HOSPADM

## 2021-08-31 RX ORDER — DEXTROSE MONOHYDRATE 50 MG/ML
100 INJECTION, SOLUTION INTRAVENOUS PRN
Status: DISCONTINUED | OUTPATIENT
Start: 2021-08-31 | End: 2021-09-05 | Stop reason: HOSPADM

## 2021-08-31 RX ORDER — CHOLESTYRAMINE 4 G/9G
1 POWDER, FOR SUSPENSION ORAL 2 TIMES DAILY
Status: DISCONTINUED | OUTPATIENT
Start: 2021-08-31 | End: 2021-08-31

## 2021-08-31 RX ORDER — ACETAMINOPHEN 650 MG/1
650 SUPPOSITORY RECTAL EVERY 6 HOURS PRN
Status: DISCONTINUED | OUTPATIENT
Start: 2021-08-31 | End: 2021-09-05 | Stop reason: HOSPADM

## 2021-08-31 RX ORDER — SODIUM CHLORIDE 0.9 % (FLUSH) 0.9 %
5-40 SYRINGE (ML) INJECTION PRN
Status: DISCONTINUED | OUTPATIENT
Start: 2021-08-31 | End: 2021-09-03 | Stop reason: SDUPTHER

## 2021-08-31 RX ORDER — SODIUM CHLORIDE 0.9 % (FLUSH) 0.9 %
10 SYRINGE (ML) INJECTION ONCE
Status: COMPLETED | OUTPATIENT
Start: 2021-08-31 | End: 2021-08-31

## 2021-08-31 RX ORDER — ACETAMINOPHEN 325 MG/1
650 TABLET ORAL EVERY 6 HOURS PRN
Status: DISCONTINUED | OUTPATIENT
Start: 2021-08-31 | End: 2021-09-05 | Stop reason: HOSPADM

## 2021-08-31 RX ORDER — FLUTICASONE PROPIONATE 50 MCG
2 SPRAY, SUSPENSION (ML) NASAL DAILY
Status: DISCONTINUED | OUTPATIENT
Start: 2021-08-31 | End: 2021-08-31

## 2021-08-31 RX ORDER — METOPROLOL SUCCINATE 25 MG/1
25 TABLET, EXTENDED RELEASE ORAL DAILY
Status: DISCONTINUED | OUTPATIENT
Start: 2021-08-31 | End: 2021-09-05 | Stop reason: HOSPADM

## 2021-08-31 RX ORDER — WARFARIN SODIUM 5 MG/1
5 TABLET ORAL
Status: COMPLETED | OUTPATIENT
Start: 2021-08-31 | End: 2021-08-31

## 2021-08-31 RX ORDER — TERBINAFINE HYDROCHLORIDE 250 MG/1
250 TABLET ORAL DAILY
Status: DISCONTINUED | OUTPATIENT
Start: 2021-08-31 | End: 2021-09-05 | Stop reason: HOSPADM

## 2021-08-31 RX ORDER — INSULIN GLARGINE 100 [IU]/ML
25 INJECTION, SOLUTION SUBCUTANEOUS NIGHTLY
COMMUNITY
End: 2021-09-20 | Stop reason: SDUPTHER

## 2021-08-31 RX ORDER — DULOXETIN HYDROCHLORIDE 30 MG/1
30 CAPSULE, DELAYED RELEASE ORAL DAILY
Status: DISCONTINUED | OUTPATIENT
Start: 2021-08-31 | End: 2021-09-05 | Stop reason: HOSPADM

## 2021-08-31 RX ORDER — FENOFIBRATE 54 MG/1
54 TABLET ORAL DAILY
Status: DISCONTINUED | OUTPATIENT
Start: 2021-08-31 | End: 2021-09-05 | Stop reason: HOSPADM

## 2021-08-31 RX ORDER — DEXTROSE MONOHYDRATE 25 G/50ML
12.5 INJECTION, SOLUTION INTRAVENOUS PRN
Status: DISCONTINUED | OUTPATIENT
Start: 2021-08-31 | End: 2021-09-05 | Stop reason: HOSPADM

## 2021-08-31 RX ORDER — PREGABALIN 75 MG/1
75 CAPSULE ORAL 2 TIMES DAILY
Status: DISCONTINUED | OUTPATIENT
Start: 2021-08-31 | End: 2021-09-05 | Stop reason: HOSPADM

## 2021-08-31 RX ORDER — ONDANSETRON 4 MG/1
4 TABLET, ORALLY DISINTEGRATING ORAL EVERY 8 HOURS PRN
Status: DISCONTINUED | OUTPATIENT
Start: 2021-08-31 | End: 2021-09-05 | Stop reason: HOSPADM

## 2021-08-31 RX ADMIN — Medication: at 23:07

## 2021-08-31 RX ADMIN — DULOXETINE HYDROCHLORIDE 30 MG: 30 CAPSULE, DELAYED RELEASE ORAL at 23:05

## 2021-08-31 RX ADMIN — Medication 2000 MG: at 14:47

## 2021-08-31 RX ADMIN — CYCLOBENZAPRINE 10 MG: 10 TABLET, FILM COATED ORAL at 23:04

## 2021-08-31 RX ADMIN — ENOXAPARIN SODIUM 135 MG: 150 INJECTION SUBCUTANEOUS at 23:06

## 2021-08-31 RX ADMIN — FENOFIBRATE 54 MG: 54 TABLET ORAL at 23:06

## 2021-08-31 RX ADMIN — FERROUS SULFATE TAB 325 MG (65 MG ELEMENTAL FE) 325 MG: 325 (65 FE) TAB at 23:05

## 2021-08-31 RX ADMIN — Medication 2000 MG: at 23:07

## 2021-08-31 RX ADMIN — PANTOPRAZOLE SODIUM 40 MG: 40 TABLET, DELAYED RELEASE ORAL at 23:05

## 2021-08-31 RX ADMIN — Medication 10 ML: at 23:25

## 2021-08-31 RX ADMIN — OXYCODONE AND ACETAMINOPHEN 1 TABLET: 5; 325 TABLET ORAL at 18:07

## 2021-08-31 RX ADMIN — HYDROXYUREA 500 MG: 500 CAPSULE ORAL at 23:06

## 2021-08-31 RX ADMIN — VANCOMYCIN HYDROCHLORIDE 2000 MG: 10 INJECTION, POWDER, LYOPHILIZED, FOR SOLUTION INTRAVENOUS at 23:07

## 2021-08-31 RX ADMIN — LISINOPRIL 5 MG: 10 TABLET ORAL at 23:04

## 2021-08-31 RX ADMIN — IOPAMIDOL 75 ML: 755 INJECTION, SOLUTION INTRAVENOUS at 13:20

## 2021-08-31 RX ADMIN — METOPROLOL SUCCINATE 25 MG: 25 TABLET, FILM COATED, EXTENDED RELEASE ORAL at 23:05

## 2021-08-31 RX ADMIN — SODIUM CHLORIDE, PRESERVATIVE FREE 10 ML: 5 INJECTION INTRAVENOUS at 13:20

## 2021-08-31 RX ADMIN — ENOXAPARIN SODIUM 135 MG: 150 INJECTION SUBCUTANEOUS at 14:47

## 2021-08-31 RX ADMIN — PREGABALIN 75 MG: 75 CAPSULE ORAL at 23:04

## 2021-08-31 RX ADMIN — PRAVASTATIN SODIUM 20 MG: 20 TABLET ORAL at 23:05

## 2021-08-31 RX ADMIN — ACETAMINOPHEN 650 MG: 325 TABLET ORAL at 23:05

## 2021-08-31 RX ADMIN — INSULIN GLARGINE 25 UNITS: 100 INJECTION, SOLUTION SUBCUTANEOUS at 23:29

## 2021-08-31 RX ADMIN — WARFARIN SODIUM 5 MG: 5 TABLET ORAL at 23:05

## 2021-08-31 ASSESSMENT — PAIN SCALES - GENERAL
PAINLEVEL_OUTOF10: 8
PAINLEVEL_OUTOF10: 7
PAINLEVEL_OUTOF10: 7

## 2021-08-31 ASSESSMENT — PAIN DESCRIPTION - ORIENTATION
ORIENTATION: LEFT
ORIENTATION: LEFT

## 2021-08-31 ASSESSMENT — PAIN DESCRIPTION - PAIN TYPE
TYPE: ACUTE PAIN
TYPE: ACUTE PAIN

## 2021-08-31 ASSESSMENT — PAIN DESCRIPTION - LOCATION: LOCATION: GROIN;LEG;FOOT

## 2021-08-31 ASSESSMENT — PAIN DESCRIPTION - DESCRIPTORS: DESCRIPTORS: ACHING;STABBING;THROBBING

## 2021-08-31 ASSESSMENT — PAIN DESCRIPTION - FREQUENCY: FREQUENCY: CONTINUOUS

## 2021-08-31 ASSESSMENT — PAIN DESCRIPTION - ONSET: ONSET: ON-GOING

## 2021-08-31 NOTE — H&P
Hospitalist History & Physical      PCP: IGOR [place] - CNP    Date of Admission: 8/31/2021    Date of Service: Pt seen/examined on 8/31/2021 and is admitted to Inpatient with expected LOS greater than two midnights due to medical therapy. Chief Complaint:  had concerns including Leg Pain (redness, swelling, pain to LLE, +thinners, hx DVT). History Of Present Illness:    Mr. Obdulio Alcazar, a 50y.o. 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 Patient presented to the ED with LLE pain, redness, and edema x 3 days. He has a history of DVTs and PEs for which he is anticoagulated on Coumadin. He reports he is compliant though he is not compliant with INR checks. He has not had his INR checked since June though he states he's been checking it at his friends house. He stated he last checked it 3 weeks ago and it was 3.2. He denies any chest pain, SOB, fevers, N/V, or prolonged sedentary periods. ER COURSE:  In ED patients vitals remained stable. Laboratory work-up significant for hypokalemia-3.4, leukocytosis-12.0, and elevated troponin-16. Ultrasound of the left lower extremity revealed a left iliac and common femoral vein occlusive thrombus.     Past Medical History:        Diagnosis Date    Accident 11/2019    stepped on nail rt ft about 1 month ago- healed per patient    Acute thrombosis of inferior vena cava ([address] 10/10/2020    SIMÓN (acute kidney injury) ([address] 2008 apx    kidney bruised due to fall / Kristeen Eaves  Allergic rhinitis     Chronic back pain     Depression     Diabetes mellitus (HCC)     Difficulty sleeping     at times    Displacement of lumbar intervertebral disc without myelopathy     Fibromyalgia     Fractured rib     2008 / healed    H/O seasonal allergies     Head injury 1980'S apx    no residual s/s    Hyperlipidemia     Hypertension     Obesity (BMI 35.0-39.9 without comorbidity)     bmi 39.2  weight 296 #    Osteoarthritis     Thoracic or lumbosacral neuritis or radiculitis, unspecified        Past Surgical History:        Procedure Laterality Date    COLONOSCOPY N/A 9/5/2020    COLONOSCOPY WITH BIOPSY performed by Thomsa Cisneros MD at [address] PTC NEW ACCESS  8/3/2020    CT PTC NEW ACCESS 8/3/2020 [facility] CT    FOOT SURGERY Right 1985    to treat shattered bones    HERNIA REPAIR  2001    DOUBLE HERNIA    HERNIA REPAIR Right 12/9/2019    LAPAROSCOPIC RIGHT INGUINAL HERNIA REPAIR, MESH 10x15 cm PLACEMENT performed by Ad Ventura MD at [address] [facility] Left 4/11/2016    ILIAC ARTERY STENT INSERTION N/A 10/11/2020    S/P ILIAC STENT PLACEMENT VISUALIZATION performed by Wisam Bowen MD at [place] [address] N/A 9/18/2020    LAPAROTOMY EXPLORATORY, CHOLECYSTECTOMY, BOWEL RESECTION, right darian colectomy, partial omentectomy, and splenectomy performed by Thomas Cisneros MD at [facility] COLONOSCOPY FLX DX W/COLLIRENE Soterese 1978 PFRMD N/A 5/7/2018    COLONOSCOPY DIAGNOSTIC performed by Kevin Valdes MD at [address] [facility] Left 2014    Dr. Saunders Ar ARTHROSCOPY Left 11 21 14    SHOULDER SURGERY  2001 &2007    RIGHT AND LEFT repair of tears    THROMBECTOMY / EMBOLECTOMY FEMORAL Left 1/1/2021    LEFT LOWER EXTREMITY, VENOGRAM, FOLLOW UP POSSIBLE REMOVAL LYSIS CATHETER performed by Wisam Bowen MD at [facility] / EMBOLECTOMY FEMORAL Left 1/2/2021    LEFT LOWER EXTREMITY VENOGRAM performed by Crystal Hammond MD at [address] [facility] Right 10/31/2016    Total right hip  Cem Heaton MD    UPPER GASTROINTESTINAL ENDOSCOPY N/A 9/4/2020    EGD DIAGNOSTIC ONLY performed by Gena Bateman MD at [address] [facility] Left 1/3/2021    LEFT LOWER EXTREMITY VENOGRAM, PLACEMENT OF NEW LYSIS CATHETER performed by Crystal Hammond MD at [place] [address]  2007    LEFT WRIST     Medications Prior to Admission:      Prior to Admission medications    Medication Sig Start Date End Date Taking?  Authorizing Provider   lisinopril (PRINIVIL;ZESTRIL) 5 MG tablet Take 1 tablet by mouth daily 6/28/21   Lauree Left, APRN - CNP   warfarin (COUMADIN) 2.5 MG tablet take 2 tablets by mouth daily ON [facility] AND THURSDAY and 1 tablet by mouth ON ALL OTHER DAYS 6/8/21   Ernieee LeftIGOR CNP   blood glucose test strips [facility] G2 TEST) strip Check FBS daily and PRN 6/8/21   Lauree Left, IGOR Michelle CNP   Lancets MISC Check FBS daily and PRN 6/8/21   Lauree LeftIGOR CNP   Insulin Syringe-Needle U-100 30G X 5/16\" 1 ML MISC 1 each by Does not apply route daily Use as directed 5/24/21   Ernieee LeftIGOR CNP   insulin lispro (HUMALOG) 100 UNIT/ML injection vial Inject 0-3 Units into the skin nightly **Corrective Bedtime (50%) Low Dose Algorithm**   Glucose: Dose:                No Insulin   140-249 1 Unit   250-349 2 Units   Over 350 3 Units 5/17/21   Lauree LeftIGOR CNP   insulin glargine (LANTUS) 100 UNIT/ML injection vial Inject 25 Units into the skin Daily 5/17/21   Lauree LeftIGOR CNP   warfarin (COUMADIN) 5 MG tablet Take 1 tablet by mouth daily 5/3/21   Lauree Left, APRN - CNP   loperamide (IMODIUM) 2 MG capsule Take 1 capsule by mouth 4 times daily as needed for Diarrhea 4/19/21   Lauree Left, APRN - CNP   pregabalin (LYRICA) 150 MG capsule Take 1 capsule by mouth 2 times daily for 30 days.  3/30/21 4/29/21  IGOR Neal CNP   Handicap Placard MISC by Does not apply route Patient cannot walk 200 ft without stopping to rest.    Expiration 3/30/25 3/29/21   IGOR Neal CNP   fluticasone [facility]) 50 MCG/ACT nasal spray instill 2 sprays into each nostril once daily 3/29/21   IGOR Neal CNP   furosemide (LASIX) 20 MG tablet Take 1 tablet by mouth daily for 7 days 12/31/20 1/7/21  Jacob Mccullough MD   Elastic Bandages & Supports (JOBST KNEE HIGH COMPRESSION SM) MISC Knee high compression stockings, 20-30 mm/Hg 11/10/20   Rivera Dave MD   potassium chloride (KLOR-CON M) 20 MEQ extended release tablet Take 1 tablet by mouth 2 times daily (with meals) 10/15/20   Alfa Quintana MD   metoprolol succinate (TOPROL XL) 25 MG extended release tablet Take 1 tablet by mouth daily 10/16/20   Alfa Quintana MD   cholestyramine Ernie Hals) 4 g packet Take 1 packet by mouth 2 times daily 9/26/20   Michaeline Kawasaki, MD   glucose (GLUTOSE) 40 % GEL Take 37.5 mLs by mouth as needed (low sugar) 9/26/20   Michaeline Kawasaki, MD   melatonin 3 MG TABS tablet Take 3 mg by mouth nightly as needed (sleep)    Historical Provider, MD   cyclobenzaprine (FLEXERIL) 10 MG tablet Take 10 mg by mouth three times daily    Historical Provider, MD   fenofibrate (TRICOR) 54 MG tablet Take 54 mg by mouth daily    Historical Provider, MD   ferrous sulfate (IRON 325) 325 (65 Fe) MG tablet Take 325 mg by mouth 2 times daily    Historical Provider, MD   hydroxyurea (HYDREA) 500 MG chemo capsule Take 500 mg by mouth 2 times daily    Historical Provider, MD   insulin lispro (HUMALOG) 100 UNIT/ML injection vial Inject 3 Units into the skin 3 times daily (before meals) Injects 3 units three times daily before meals in addition to following sliding scale  : 0 units  141-180: 1 unit  181-220: 2 units  221-260: 3 units  261-300: 4 units  301-340: 5 units  >341: 6 units and call MD    Historical Provider, MD pantoprazole (PROTONIX) 40 MG tablet Take 40 mg by mouth daily    Historical Provider, MD   DULoxetine (CYMBALTA) 30 MG extended release capsule Take 1 capsule by mouth daily 5/14/20   IGOR Cintron CNP   pravastatin (PRAVACHOL) 20 MG tablet Take 1 tablet by mouth nightly 5/14/20   IGOR Cintron CNP     Allergies:  Seasonal and Tramadol    Social History:    RESIDENCE: Private  TOBACCO:   reports that he has never smoked. His smokeless tobacco use includes chew. ETOH:   reports previous alcohol use. Family History:          Problem Relation Age of Onset    Hypertension Mother     Arthritis Father     Diabetes Father     Cancer Maternal Grandfather         Skin    Cancer Paternal Uncle         skin    Diabetes Paternal Grandfather     Heart Disease Maternal Grandmother     Stroke Maternal Aunt      Review of Systems: All bolded are positive; please see HPI  General:  Fever, chills, diaphoresis, fatigue, malaise, night sweats, weight loss  Psychological:  Anxiety, disorientation, hallucinations. ENT:  Epistaxis, headaches, vertigo, visual changes. Cardiovascular:  Chest pain, irregular heartbeats, palpitations, paroxysmal nocturnal dyspnea. Respiratory:  Shortness of breath, coughing, sputum production, hemoptysis, wheezing, orthopnea.   Gastrointestinal:  Nausea, vomiting, diarrhea, heartburn, constipation, abdominal pain, hematemesis, hematochezia, melena, acholic stools  Genito-Urinary:  Dysuria, urgency, frequency, hematuria  Musculoskeletal:  Joint pain, joint stiffness, joint swelling, muscle pain  Neurology:  Headache, focal neurological deficits, weakness, numbness, paresthesia  Derm:  Rashes, ulcers, excoriations, bruising  Extremities:  Decreased ROM, peripheral edema, mottling    PHYSICAL EXAM:  BP (!) 142/88   Pulse 95   Temp 97.5 °F (36.4 °C)   Resp 23   Wt 290 lb (131.5 kg)   SpO2 99%   BMI 38.26 kg/m²   General appearance: Obese middle aged male in no apparent distress, appears stated age and cooperative. HEENT: Normal cephalic, atraumatic without obvious deformity. Pupils equal, round, and reactive to light. Extra ocular muscles intact. Conjunctivae/corneas clear. Neck: Supple, with full range of motion. No jugular venous distention. Trachea midline. Respiratory:  Clear to auscultation bilaterally. No apparent distress. Cardiovascular:  Regular rate and rhythm. S1, S2 without murmurs, rubs, or gallops. PV: Brisk capillary refill. +2 pedal and radial pulses bilaterally. No clubbing or cyanosis. Nonpitting edema to the LLE. Abdomen: Soft, non-tender, obese, non-distended. +BS  Musculoskeletal: No obvious deformities or erythematous or edematous joints. Skin: Normal skin color. Redness to the LLE. Neurologic:  Neurovascularly intact without any focal sensory/motor deficits.  Cranial nerves: II-XII intact, grossly non-focal.  Psychiatric: Alert and oriented, thought content appropriate, normal insight    Reviewed EKG and CXR personally      CBC:   Recent Labs     08/30/21 2252   WBC 12.0*   RBC 4.14   HGB 12.4*   HCT 37.0   MCV 89.4   RDW 14.0   *     BMP:   Recent Labs     08/30/21 2252      K 3.4*      CO2 28   BUN 9   CREATININE 0.8     LFT:  Recent Labs     08/30/21 2252   PROT 7.4   ALKPHOS 177*   ALT 47*   AST 26   BILITOT 0.2     PT/INR:   Recent Labs     08/30/21 2252   INR 1.6   APTT 35.7*     ESR:   Lab Results   Component Value Date    SEDRATE 95 (H) 09/24/2020     CRP:   Lab Results   Component Value Date    CRP 16.9 (H) 10/10/2020     D Dimer:   Lab Results   Component Value Date    DDIMER 578 08/06/2020      Lactic Acid:   Lab Results   Component Value Date    LACTA 1.5 08/30/2021     Thyroid Studies:   Lab Results   Component Value Date    TSH 1.650 03/29/2021     Oupatient labs:  Lab Results   Component Value Date    CHOL 154 03/29/2021    TRIG 202 (H) 03/29/2021    HDL 38 03/29/2021    LDLCALC 76 03/29/2021    TSH 1.650 03/29/2021    INR 1.6 08/30/2021    LABA1C 6.5 03/29/2021     Urinalysis:    Lab Results   Component Value Date    NITRU Negative 04/23/2021    WBCUA NONE 04/23/2021    BACTERIA RARE 04/23/2021    RBCUA 0-1 04/23/2021    BLOODU Negative 04/23/2021    SPECGRAV >=1.030 04/23/2021    GLUCOSEU Negative 04/23/2021       Imaging:  XR CHEST (2 VW)  Result Date: 8/30/2021  No evidence of active cardiopulmonary pathology. US DUP LOWER EXTREMITY LEFT JORGE  Result Date: 8/30/2021  There is evidence for deep venous thrombosis, when compared to previous there was nonocclusive thrombus seen in this region of the left iliac vein and the common femoral vein. The thrombus is now occlusive and appears worse. ALERT:  THIS IS AN ABNORMAL REPORT     ASSESSMENT:  Occlusive DVT, left femoral  Hx of DVT/PE  anticoagulated on coumadin  Cellulitis LLE  HTN  Obesity  IDDM  Peripheral neuropathy  HLD  Noncompliance    PLAN:  Admit to med surg  Resume home meds  Vascular surgery following   ID consulted per vascular surgery   Therapeutic lovenox until therapeutic INR reached  Neuro vascular checks  Pain control    Diet: Carb controlled  Code Status: Full  Surrogate decision maker confirmed with patient:   Extended Emergency Contact Information  Primary Emergency Contact: Mindi Sue, [facility] Phone: [phone]  Mobile Phone: [phone]  Relation: Parent  Preferred language: English   needed? No  Secondary Emergency Contact: Amos [address] Phone: [phone]  Mobile Phone: [phone]  Relation: Child  Preferred language: English   needed?  No    DVT Prophylaxis: [x]Lovenox []Heparin []PCD [] [address] [facility]  []Encouraged ambulation  Disposition: [x]Med/Surg [] Intermediate [] ICU/CCU  Admit status: [] Observation [x] Inpatient     +++++++++++++++++++++++++++++++++++++++++++++++++  Pema Comas, C/ [address]ad[address], [place]  +++++++++++++++++++++++++++++++++++++++++++++++++  NOTE: This report was transcribed using voice recognition software. Every effort was made to ensure accuracy; however, inadvertent computerized transcription errors may be present.

## 2021-08-31 NOTE — CONSULTS
 miconazole nitrate 2 % ointment   Topical BID Roby Maldonado MD        enoxaparin (LOVENOX) injection 135 mg  1 mg/kg SubCUTAneous BID Roby Maldonado MD         Current Outpatient Medications   Medication Sig Dispense Refill    lisinopril (PRINIVIL;ZESTRIL) 5 MG tablet Take 1 tablet by mouth daily 90 tablet 1    warfarin (COUMADIN) 2.5 MG tablet take 2 tablets by mouth daily ON [facility] AND THURSDAY and 1 tablet by mouth ON ALL OTHER DAYS 60 tablet 1    blood glucose test strips (EVENCARE G2 TEST) strip Check FBS daily and  each 3    Lancets MISC Check FBS daily and  each 3    Insulin Syringe-Needle U-100 30G X 5/16\" 1 ML MISC 1 each by Does not apply route daily Use as directed 100 each 1    insulin lispro (HUMALOG) 100 UNIT/ML injection vial Inject 0-3 Units into the skin nightly **Corrective Bedtime (50%) Low Dose Algorithm**   Glucose: Dose:                No Insulin   140-249 1 Unit   250-349 2 Units   Over 350 3 Units 1 vial 3    insulin glargine (LANTUS) 100 UNIT/ML injection vial Inject 25 Units into the skin Daily 1 vial 3    warfarin (COUMADIN) 5 MG tablet Take 1 tablet by mouth daily 30 tablet 5    loperamide (IMODIUM) 2 MG capsule Take 1 capsule by mouth 4 times daily as needed for Diarrhea 120 capsule 3    pregabalin (LYRICA) 150 MG capsule Take 1 capsule by mouth 2 times daily for 30 days.  60 capsule 0    Handicap Placard MISC by Does not apply route Patient cannot walk 200 ft without stopping to rest.    Expiration 3/30/25 1 each 0    fluticasone (FLONASE) 50 MCG/ACT nasal spray instill 2 sprays into each nostril once daily 16 g 5    furosemide (LASIX) 20 MG tablet Take 1 tablet by mouth daily for 7 days 7 tablet 0    Elastic Bandages & Supports (JOBST KNEE HIGH COMPRESSION SM) MISC Knee high compression stockings, 20-30 mm/Hg 2 each 3    potassium chloride (KLOR-CON M) 20 MEQ extended release tablet Take 1 tablet by mouth 2 times daily (with meals) 60 tablet 3    metoprolol succinate (TOPROL XL) 25 MG extended release tablet Take 1 tablet by mouth daily 30 tablet 0    cholestyramine (QUESTRAN) 4 g packet Take 1 packet by mouth 2 times daily 90 packet 3    glucose (GLUTOSE) 40 % GEL Take 37.5 mLs by mouth as needed (low sugar) 45 g 1    melatonin 3 MG TABS tablet Take 3 mg by mouth nightly as needed (sleep)      cyclobenzaprine (FLEXERIL) 10 MG tablet Take 10 mg by mouth three times daily      fenofibrate (TRICOR) 54 MG tablet Take 54 mg by mouth daily      ferrous sulfate (IRON 325) 325 (65 Fe) MG tablet Take 325 mg by mouth 2 times daily      hydroxyurea (HYDREA) 500 MG chemo capsule Take 500 mg by mouth 2 times daily      insulin lispro (HUMALOG) 100 UNIT/ML injection vial Inject 3 Units into the skin 3 times daily (before meals) Injects 3 units three times daily before meals in addition to following sliding scale  : 0 units  141-180: 1 unit  181-220: 2 units  221-260: 3 units  261-300: 4 units  301-340: 5 units  >341: 6 units and call MD      pantoprazole (PROTONIX) 40 MG tablet Take 40 mg by mouth daily      DULoxetine (CYMBALTA) 30 MG extended release capsule Take 1 capsule by mouth daily 90 capsule 1    pravastatin (PRAVACHOL) 20 MG tablet Take 1 tablet by mouth nightly 90 tablet 1       Past Medical History:   Diagnosis Date    Accident 11/2019    stepped on nail rt ft about 1 month ago- healed per patient    Acute thrombosis of inferior vena cava ([facility] [address] 10/10/2020    SIMÓN (acute kidney injury) ([facility] [address] 2008 apx    kidney bruised due to fall / General Hashimoto    Allergic rhinitis     Blister of left leg 8/31/2021    Cellulitis of leg, left 8/31/2021    Chronic back pain     Depression     Dermatophytosis 8/31/2021    Diabetes mellitus ([address]     Difficulty sleeping     at times    Displacement of lumbar intervertebral disc without myelopathy     Fibromyalgia     Fractured rib     2008 / healed    H/O seasonal allergies     Head injury 1980'S apx    no residual s/s    History of deep vein thrombosis 8/31/2021    Hyperlipidemia     Hypertension     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    Lymphedema of left leg 8/31/2021    Obesity (BMI 35.0-39.9 without comorbidity)     bmi 39.2  weight 296 #    Osteoarthritis     Recurrent acute deep vein thrombosis (DVT) of left lower extremity ([address] 8/31/2021    S/P IVC filter 8/31/2021    Subtherapeutic international normalized ratio (INR) 8/31/2021    Thoracic or lumbosacral neuritis or radiculitis, unspecified        Past Surgical History:   Procedure Laterality Date    COLONOSCOPY N/A 9/5/2020    COLONOSCOPY WITH BIOPSY performed by Lesli Gonzalez MD at [address] PTC NEW ACCESS  8/3/2020    CT PTC NEW ACCESS 8/3/2020 [facility] CT    FOOT SURGERY Right 1985    to treat shattered bones    HERNIA REPAIR  2001    DOUBLE HERNIA    HERNIA REPAIR Right 12/9/2019    LAPAROSCOPIC RIGHT INGUINAL HERNIA REPAIR, MESH 10x15 cm PLACEMENT performed by Bobbi Gordon MD at [address] [place] [address] Left 4/11/2016    ILIAC ARTERY STENT INSERTION N/A 10/11/2020    S/P ILIAC STENT PLACEMENT VISUALIZATION performed by Dany Alcocer MD at [place] [address] N/A 9/18/2020    LAPAROTOMY EXPLORATORY, CHOLECYSTECTOMY, BOWEL RESECTION, right darian colectomy, partial omentectomy, and splenectomy performed by Lesli Gonzalez MD at [facility] COLONOSCOPY FLX DX W/COLLJ Bret 1978 PFRMD N/A 5/7/2018    COLONOSCOPY DIAGNOSTIC performed by Luis Enrique Uriostegui MD at [address] [place] [address] Left 2014    Dr. Shayy Yu ARTHROSCOPY Left 11 21 14    SHOULDER SURGERY  2001 &2007    RIGHT AND LEFT repair of tears    THROMBECTOMY / EMBOLECTOMY FEMORAL Left 1/1/2021    LEFT LOWER EXTREMITY, VENOGRAM, FOLLOW UP POSSIBLE REMOVAL LYSIS CATHETER performed by Dany Alcocer MD at [facility] [address] Left 1/2/2021    LEFT LOWER EXTREMITY VENOGRAM performed by Natasha Quiroz MD at [facility] Right 10/31/2016    Total right hip  Keysha Muse MD    UPPER GASTROINTESTINAL ENDOSCOPY N/A 9/4/2020    EGD DIAGNOSTIC ONLY performed by Mariangel Singh MD at [address] Left 1/3/2021    LEFT LOWER EXTREMITY VENOGRAM, PLACEMENT OF NEW LYSIS CATHETER performed by Natasha Quiroz MD at [place] [address]  2007    LEFT WRIST       Family History   Problem Relation Age of Onset    Hypertension Mother     Arthritis Father     Diabetes Father     Cancer Maternal Grandfather         Skin    Cancer Paternal Uncle         skin    Diabetes Paternal Grandfather     Heart Disease Maternal Grandmother     Stroke Maternal Aunt        Social History     Socioeconomic History    Marital status:      Spouse name: Not on file    Number of children: Not on file    Years of education: Not on file    Highest education level: Not on file   Occupational History    Occupation: disabled from     Tobacco Use    Smoking status: Never Smoker    Smokeless tobacco: Current User     Types: Chew   Vaping Use    Vaping Use: Never used   Substance and Sexual Activity    Alcohol use: Not Currently     Alcohol/week: 0.0 standard drinks    Drug use: No    Sexual activity: Not Currently   Other Topics Concern    Not on file   Social History Narrative    Not on file     Social Determinants of Health     Financial Resource Strain:     Difficulty of Paying Living Expenses:    Food Insecurity:     Worried About Running Out of Food in the Last Year:     Ran Out of Food in the Last Year:    Transportation Needs:     Lack of Transportation (Medical):      Lack of Transportation (Non-Medical):    Physical Activity:     Days of Exercise per Week:     Minutes of Exercise per Session:    Stress:     Feeling of Stress :    Social Connections:     Frequency of Communication with Friends and Family:     Frequency of Social Gatherings with Friends and Family:     Attends Spiritism Services:     Active Member of Clubs or Organizations:     Attends Club or Organization Meetings:     Marital Status:    Intimate Partner Violence:     Fear of Current or Ex-Partner:     Emotionally Abused:     Physically Abused:     Sexually Abused:        Review of Systems:  Skin:  No abnormal pigmentation or rash. Eyes:  No blurring, diplopia or vision loss. Ears/Nose/Throat:  No hearing loss or vertigo. Respiratory:  No cough, pleuritic chest pain, dyspnea, or wheezing. History of PE and history of tobacco use    Cardiovascular: No angina, palpitations . Extensive history of multiple recurrent DVTs involving the left leg, extend from the calf all the way to the femoral vein, subsequently involving the iliac vein and inferior vena cava thrombosis, persistent vena cava filter, status post placement of a stent of the left external iliac vein common iliac vein, multiple episodes of EKOS procedures for thrombolysis of the veins of the left leg    Gastrointestinal:  No nausea or vomiting; no abdominal pain or rectal bleeding. Musculoskeletal:  No arthritis or weakness. Lumbar sacral spine issues including radiculopathy in the past    Neurologic:  No paralysis, paresis, seizures or headaches. Hematologic/Lymphatic/Immunologic:  No anemia, abnormal bleeding/bruising. Endocrine:  No heat or cold intolerance. No polyphagia, polydipsia or polyuria. Diabetes mellitus      Physical Exam:  BP (!) 142/88   Pulse 95   Temp 97.5 °F (36.4 °C)   Resp 23   Wt 290 lb (131.5 kg)   SpO2 99%   BMI 38.26 kg/m²   General appearance:  Alert, awake, oriented x 3. No distress. Skin:  Warm and dry. Head:  Normocephalic. No masses, lesions or tenderness. Eyes:  Conjunctivae appear normal; PERRL.   Ears:  External ears normal.  Nose/Sinuses:  Septum midline, mucosa normal; no drainage. Oropharynx:  Clear, no exudate noted. Neck:  No jugular venous distention, lymphadenopathy or thyromegaly. No evidence of carotid bruit      Lungs:  Clear to ausculation bilaterally. No rhonchi, crackles, wheezes. Heart:  Regular rate and rhythm. No rub or murmur. .    Abdomen:  Soft, non-tender. No masses, organomegaly. Musculoskeletal: No joint effusions, tenderness swelling or warmth. Neuro: Speech is intact. Moving all extremities. No focal motor or sensory deficits. Extremities:  Both feet are warm to touch. The color of both feet is normal.    Patient does have mild swelling of the right    Patient has significant swelling of the left leg from the groin to the foot, with puffiness of the ankle and the foot, clinically consistent with lymphedema with underlying dermatophytosis    Patient has multiple blisters, each of them less than 1 cm    Patient does have erythema particularly left leg extending from the knee up to the groin, with significant tenderness of the medial aspect left thigh consistent with cellulitis and lymphangitis    Patient has triphasic ankle Doppler signals    No evidence of compartment syndrome, good range of motion of the ankle and the toes, with intact sensation, no evidence of any acute neurovascular compromise      Pulses Right  Left    Brachial 3 3    Radial    3=normal   Femoral 2 2  2=diminished   Popliteal    1=barely palpable   Dorsalis pedis 2 2  0=absent   Posterior tibial    4=aneurysmal           Other pertinent information:1. The past medical records were reviewed.     2.    Lab Results   Component Value Date    WBC 12.0 (H) 08/30/2021    HGB 12.4 (L) 08/30/2021    HCT 37.0 08/30/2021    MCV 89.4 08/30/2021     (H) 08/30/2021      Lab Results   Component Value Date     08/30/2021    K 3.4 (L) 08/30/2021     08/30/2021    CO2 28 08/30/2021    BUN 9 08/30/2021    CREATININE 0.8 08/30/2021 GLUCOSE 123 (H) 08/30/2021    CALCIUM 9.2 08/30/2021    PROT 7.4 08/30/2021    LABALBU 3.8 08/30/2021    BILITOT 0.2 08/30/2021    ALKPHOS 177 (H) 08/30/2021    AST 26 08/30/2021    ALT 47 (H) 08/30/2021    LABGLOM >60 08/30/2021    GFRAA >60 08/30/2021     Lab Results   Component Value Date    APTT 35.7 (H) 08/30/2021      Lab Results   Component Value Date    INR 1.6 08/30/2021    INR 3.2 06/07/2021    INR 1.6 05/24/2021    PROTIME 17.6 (H) 08/30/2021    PROTIME 38.0 06/07/2021    PROTIME 19.3 05/24/2021        3. CTA PULMONARY W CONTRAST   Final Result   No evidence of pulmonary embolism or acute pulmonary abnormality. XR CHEST (2 VW)   Final Result   No evidence of active cardiopulmonary pathology. US DUP LOWER EXTREMITY LEFT JORGE   Final Result   There is evidence for deep venous thrombosis, when compared to   previous there was nonocclusive thrombus seen in this region of the   left iliac vein and the common femoral vein. The thrombus is now   occlusive and appears worse. ALERT:  THIS IS AN ABNORMAL REPORT                 4.  Multiple procedures done by the vascular service department starting in 2020 and 2021 were personally reviewed       5. The venous ultrasound that was done was personally reviewed, this admission was compared to the study doneworsening, with complete occlusion and thrombosis compared to somewhat nonocclusive thrombus last year, correlates well with subtherapeutic PT/INR    6. The last CT scan abdomen pelvis revealed somewhat fibrotic inferior vena cava at the level of the vena cava filter placement    7. The CTA of the chest that was done was personally reviewed, no evidence of pulmonary embolus    Assessment:    1.   Recurrent acute DVT left leg, involving the iliofemoral venous system, associate with subtherapeutic PT/INR, with past history of insertion of vena cava filter, placement of left external iliac and common iliac vein stents, known history of thrombosis of the inferior vena cava with occlusion      2. Cellulitis, left leg with blister formation, lymphangitis, with underlying dermatophytosis and lymphedema    3.   Noncompliance regarding monitoring of the PT/INR, the last PT INR noted in the epic was on 7 June    Patient Active Problem List   Diagnosis    Neuropathic pain    Lumbar spondylosis    Osteoarthritis    Insomnia    Fibromyalgia    Vitamin D deficiency    Essential hypertension    Allergic rhinitis    Lumbar radiculopathy    Osteoarthritis of spine with radiculopathy, lumbar region    Class 1 obesity in adult    Lumbar disc herniation    Type 2 diabetes mellitus (HCC)    Primary osteoarthritis of left hip    Primary osteoarthritis of right hip    Neuropathy    Left leg swelling    Rectus diastasis    Recurrent unilateral inguinal hernia    Abnormal findings on diagnostic imaging of gall bladder    Cyst of spleen    Other pulmonary embolism without acute cor pulmonale (HCC)    Thrombocytosis (HCC)    Acute blood loss anemia    Acute deep vein thrombosis (DVT) (HCC)    Other hyperlipidemia    Tobacco dependence    Anticoagulated    Cellulitis of leg, left    Dermatophytosis    Lymphedema of left leg    Blister of left leg    Recurrent acute deep vein thrombosis (DVT) of left lower extremity (HCC)    S/P IVC filter    History of deep vein thrombosis    Subtherapeutic international normalized ratio (INR)    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[address]            Plan:     I had a long and detailed discussion with patient, all options, risks benefits and alternatives were explained, patient was recommended consider therapy, with bridging Lovenox until the PT/INR is therapeutic, told him, ideally will be at least 2.5 because of significant thromboembolic issues, the importance of regular follow-up of monitoring of their PT/INR were discussed with the patient    Patient was also told, he does have underlying dermatophytosis, with lymphedema, venous hypertension, blister formation, cellulitis and lymphangitis of the medial aspect of the left thigh, also recommending ID consultation for their input    Patient recommended to keep left leg elevated decrease edema    Once the cellulitis resolves, patient may benefit from lymphedema therapy and once maximum improvement is noted, have the legs placed for compression device    All his questions were answered    I have also discussed the ER physician Dr. Aron Rivera    Thank you for letting me participate in the care of your patient            Electronically signed by Jonnathan Delgadillo MD on 8/31/2021 at 2:01 PM

## 2021-08-31 NOTE — PROGRESS NOTES
Anticoagulation Summary    Recent warfarin administrations      No warfarin orders with administrations found.           Orders not given:          warfarin (COUMADIN) tablet 5 mg    warfarin (COUMADIN) daily dosing (placeholder)

## 2021-08-31 NOTE — ED NOTES
FIRST PROVIDER CONTACT ASSESSMENT NOTE                                                                                                Department of Emergency Medicine                                                      First Provider Note  21  10:21 PM EDT  NAME: Hitesh Kat II  : 1975  MRN: [id number]    Chief Complaint: Leg Pain (redness, swelling, pain to LLE, +thinners, hx DVT)      History of Present Illness:   Cr Kumar is a 50 y.o. male who presents to the ED for worsening left lower extremity swelling. Patient presents emergency department states that over the last 2 weeks he has had left leg swelling, he states he now has this reddened rash area to his thigh he also reports now is getting fluid-filled blisters now. He reports pain is from his toes to his groin. He reports he has an IVC filter in. He also reports that he is on Coumadin, takes 7.5 mg daily. Admits that he has not have his INR checked in 2 weeks. Patient denies chest pain but does report abdominal pain. Focused Physical Exam:  VS:    ED Triage Vitals   BP Temp Temp src Pulse Resp SpO2 Height Weight   21 2201 21 2149 -- 21 2149 21 2201 21 -- --   128/87 97.5 °F (36.4 °C)  108 18 98 %          General: Alert and in no apparent distress. Medical History:  has a past medical history of Accident, Acute thrombosis of inferior vena cava ([address] SIMÓN (acute kidney injury) ([address] Allergic rhinitis, Chronic back pain, Depression, Diabetes mellitus ([address] Difficulty sleeping, Displacement of lumbar intervertebral disc without myelopathy, Fibromyalgia, Fractured rib, H/O seasonal allergies, Head injury, Hyperlipidemia, Hypertension, Obesity (BMI 35.0-39.9 without comorbidity), Osteoarthritis, and Thoracic or lumbosacral neuritis or radiculitis, unspecified. Surgical History:  has a past surgical history that includes Neck surgery (); shoulder surgery ( &);  Wrist surgery (2007); Rotator cuff repair (Left, 2014); hernia repair (2001); Shoulder arthroscopy (Left, 11 21 14); Vasectomy; Hip Arthroplasty (Left, 4/11/2016); Total hip arthroplasty (Right, 10/31/2016); Foot surgery (Right, 1985); pr colonoscopy flx dx w/collj spec when pfrmd (N/A, 5/7/2018); hernia repair (Right, 12/9/2019); CT PTC NEW ACCESS (8/3/2020); Upper gastrointestinal endoscopy (N/A, 9/4/2020); Colonoscopy (N/A, 9/5/2020); laparotomy (N/A, 9/18/2020); iliac artery stent insertion (N/A, 10/11/2020); THROMBECTOMY / EMBOLECTOMY FEMORAL (Left, 1/1/2021); THROMBECTOMY / EMBOLECTOMY FEMORAL (Left, 1/2/2021); and Vena Cava Filter Placement (Left, 1/3/2021). Social History:  reports that he has never smoked. His smokeless tobacco use includes chew. He reports previous alcohol use. He reports that he does not use drugs. Family History: family history includes Arthritis in his father; Cancer in his maternal grandfather and paternal uncle; Diabetes in his father and paternal grandfather; Heart Disease in his maternal grandmother; Hypertension in his mother; Stroke in his maternal aunt.     Allergies: Seasonal and Tramadol     Initial Plan of Care:  Initiate Treatment-Testing, Proceed toTreatment Area When Bed Available for ED Attending/MLP to Continue Care    -------------------------------------------------END OF FIRST PROVIDER CONTACT ASSESSMENT NOTE--------------------------------------------------------  Electronically signed by IGOR Cortez CNP   DD: 8/30/21         IGOR Cortez CNP  08/30/21 2224

## 2021-08-31 NOTE — ED NOTES
Unable to complete EKG/Blood work at this time due to patient taken to [address] per transport      Aj Jordan RN  08/30/21 [id number]

## 2021-08-31 NOTE — ED PROVIDER NOTES
Thoracic or lumbosacral neuritis or radiculitis, unspecified. Past Surgical History:  has a past surgical history that includes Neck surgery (2000); shoulder surgery (2001 &2007); Wrist surgery (2007); Rotator cuff repair (Left, 2014); hernia repair (2001); Shoulder arthroscopy (Left, 11 21 14); Vasectomy; Hip Arthroplasty (Left, 4/11/2016); Total hip arthroplasty (Right, 10/31/2016); Foot surgery (Right, 1985); pr colonoscopy flx dx w/collj spec when pfrmd (N/A, 5/7/2018); hernia repair (Right, 12/9/2019); CT PTC NEW ACCESS (8/3/2020); Upper gastrointestinal endoscopy (N/A, 9/4/2020); Colonoscopy (N/A, 9/5/2020); laparotomy (N/A, 9/18/2020); iliac artery stent insertion (N/A, 10/11/2020); THROMBECTOMY / EMBOLECTOMY FEMORAL (Left, 1/1/2021); THROMBECTOMY / EMBOLECTOMY FEMORAL (Left, 1/2/2021); and Vena Cava Filter Placement (Left, 1/3/2021). Social History:  reports that he has never smoked. His smokeless tobacco use includes chew. He reports previous alcohol use. He reports that he does not use drugs. Family History: family history includes Arthritis in his father; Cancer in his maternal grandfather and paternal uncle; Diabetes in his father and paternal grandfather; Heart Disease in his maternal grandmother; Hypertension in his mother; Stroke in his maternal aunt. . Unless otherwise noted, family history is non contributory    The patients home medications have been reviewed.     Allergies: Seasonal and Tramadol    I have reviewed the past medical history, past surgical history, social history, and family history    ---------------------------------------------------PHYSICAL EXAM--------------------------------------    Constitutional/General: Alert and oriented x3  Head: Normocephalic and atraumatic  Eyes: PERRL, EOMI, sclera non icteric  ENT: Oropharynx clear, handling secretions  Neck: Supple, full ROM, no stridor, no meningeal signs  Respiratory: Lungs clear to auscultation bilaterally, no wheezes, rales, or rhonchi. Not in respiratory distress  Cardiovascular:  Regular rate. Regular rhythm. No murmurs, no gallops, no rubs. 2+ distal pulses. Equal extremity pulses. Chest: No chest wall tenderness  Gastrointestinal:  Abdomen Soft, Non tender, Non distended. No rebound, guarding, or rigidity. No pulsatile masses. Musculoskeletal: Moves all extremities x 4. Left lower extremity with significant swelling compared to the right. It is swollen and tender to the touch. Swelling is from the ankle clear to the groin. There is erythema along the medial thigh as well as anterior lateral lower leg. There is also lymphedema. There is no crepitus. Compartments are compressible. He has palpable pulses. Extremities are neurovascularly intact. Skin: skin warm and dry. No rashes. Neurologic: GCS 15, no focal deficits  Psychiatric: Normal Affect          -------------------------------------------------- RESULTS -------------------------------------------------  I have personally reviewed all laboratory and imaging results for this patient. Results are listed below.      LABS: (Lab results interpreted by me)  Results for orders placed or performed during the hospital encounter of 08/31/21   Troponin   Result Value Ref Range    Troponin, High Sensitivity 16 (H) 0 - 11 ng/L   CBC Auto Differential   Result Value Ref Range    WBC 12.0 (H) 4.5 - 11.5 E9/L    RBC 4.14 3.80 - 5.80 E12/L    Hemoglobin 12.4 (L) 12.5 - 16.5 g/dL    Hematocrit 37.0 37.0 - 54.0 %    MCV 89.4 80.0 - 99.9 fL    MCH 30.0 26.0 - 35.0 pg    MCHC 33.5 32.0 - 34.5 %    RDW 14.0 11.5 - 15.0 fL    Platelets 860 (H) 555 - 450 E9/L    MPV 8.6 7.0 - 12.0 fL    Neutrophils % 72.6 43.0 - 80.0 %    Immature Granulocytes % 0.3 0.0 - 5.0 %    Lymphocytes % 13.0 (L) 20.0 - 42.0 %    Monocytes % 9.6 2.0 - 12.0 %    Eosinophils % 3.8 0.0 - 6.0 %    Basophils % 0.7 0.0 - 2.0 %    Neutrophils Absolute 8.72 (H) 1.80 - 7.30 E9/L    Immature Granulocytes # 0.04 E9/L Lymphocytes Absolute 1.56 1.50 - 4.00 E9/L    Monocytes Absolute 1.15 (H) 0.10 - 0.95 E9/L    Eosinophils Absolute 0.46 0.05 - 0.50 E9/L    Basophils Absolute 0.09 0.00 - 0.20 E9/L   Comprehensive Metabolic Panel   Result Value Ref Range    Sodium 140 132 - 146 mmol/L    Potassium 3.4 (L) 3.5 - 5.0 mmol/L    Chloride 103 98 - 107 mmol/L    CO2 28 22 - 29 mmol/L    Anion Gap 9 7 - 16 mmol/L    Glucose 123 (H) 74 - 99 mg/dL    BUN 9 6 - 20 mg/dL    CREATININE 0.8 0.7 - 1.2 mg/dL    GFR Non-African American >60 >=60 mL/min/1.73    GFR African American >60     Calcium 9.2 8.6 - 10.2 mg/dL    Total Protein 7.4 6.4 - 8.3 g/dL    Albumin 3.8 3.5 - 5.2 g/dL    Total Bilirubin 0.2 0.0 - 1.2 mg/dL    Alkaline Phosphatase 177 (H) 40 - 129 U/L    ALT 47 (H) 0 - 40 U/L    AST 26 0 - 39 U/L   Protime-INR   Result Value Ref Range    Protime 17.6 (H) 9.3 - 12.4 sec    INR 1.6    APTT   Result Value Ref Range    aPTT 35.7 (H) 24.5 - 35.1 sec   Brain Natriuretic Peptide   Result Value Ref Range    Pro-BNP 27 0 - 125 pg/mL   Lactic Acid, Plasma   Result Value Ref Range    Lactic Acid 1.5 0.5 - 2.2 mmol/L   POCT Glucose   Result Value Ref Range    Meter Glucose 151 (H) 74 - 99 mg/dL   EKG 12 Lead   Result Value Ref Range    Ventricular Rate 104 BPM    Atrial Rate 104 BPM    P-R Interval 162 ms    QRS Duration 102 ms    Q-T Interval 348 ms    QTc Calculation (Bazett) 457 ms    P Axis 59 degrees    R Axis 78 degrees    T Axis 13 degrees   ,       RADIOLOGY:  Interpreted by Radiologist unless otherwise specified  CTA PULMONARY W CONTRAST   Final Result   No evidence of pulmonary embolism or acute pulmonary abnormality. XR CHEST (2 VW)   Final Result   No evidence of active cardiopulmonary pathology.          US DUP LOWER EXTREMITY LEFT JORGE   Final Result   There is evidence for deep venous thrombosis, when compared to   previous there was nonocclusive thrombus seen in this region of the   left iliac vein and the common femoral course included: a personal history and physicial examination, re-evaluation prior to disposition, multiple bedside re-evaluations and complex medical decision making and emergency management    This patient has remained hemodynamically stable during their ED course. Consultations:  Vascular  Sound    Counseling: The emergency provider has spoken with the patient and discussed todays results, in addition to providing specific details for the plan of care and counseling regarding the diagnosis and prognosis. Questions are answered at this time and they are agreeable with the plan.       --------------------------------- IMPRESSION AND DISPOSITION ---------------------------------    IMPRESSION  1. Acute deep vein thrombosis (DVT) of left lower extremity, unspecified vein (HCC)    2. Cellulitis of left lower extremity        DISPOSITION  Disposition: Admit to med/surg floor  Patient condition is stable        NOTE: This report was transcribed using voice recognition software.  Every effort was made to ensure accuracy; however, inadvertent computerized transcription errors may be present     Elba Barnard MD  08/31/21 [id number]
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
## 2021-09-01 LAB
ALBUMIN SERPL-MCNC: 3.5 G/DL (ref 3.5–5.2)
ALP BLD-CCNC: 141 U/L (ref 40–129)
ALT SERPL-CCNC: 32 U/L (ref 0–40)
ANION GAP SERPL CALCULATED.3IONS-SCNC: 10 MMOL/L (ref 7–16)
AST SERPL-CCNC: 19 U/L (ref 0–39)
BILIRUB SERPL-MCNC: <0.2 MG/DL (ref 0–1.2)
BOTTLE TYPE: ABNORMAL
BUN BLDV-MCNC: 8 MG/DL (ref 6–20)
CALCIUM SERPL-MCNC: 8.7 MG/DL (ref 8.6–10.2)
CANDIDA ALBICANS BY PCR: NOT DETECTED
CANDIDA GLABRATA BY PCR: NOT DETECTED
CANDIDA KRUSEI BY PCR: NOT DETECTED
CANDIDA PARAPSILOSIS BY PCR: NOT DETECTED
CANDIDA TROPICALIS BY PCR: NOT DETECTED
CHLORIDE BLD-SCNC: 108 MMOL/L (ref 98–107)
CO2: 24 MMOL/L (ref 22–29)
CREAT SERPL-MCNC: 0.7 MG/DL (ref 0.7–1.2)
ENTEROBACTER CLOACAE COMPLEX BY PCR: NOT DETECTED
ENTEROBACTERALES BY PCR: NOT DETECTED
ESCHERICHIA COLI BY PCR: NOT DETECTED
GFR AFRICAN AMERICAN: >60
GFR NON-AFRICAN AMERICAN: >60 ML/MIN/1.73
GLUCOSE BLD-MCNC: 118 MG/DL (ref 74–99)
HAEMOPHILUS INFLUENZAE BY PCR: NOT DETECTED
HCT VFR BLD CALC: 34 % (ref 37–54)
HEMOGLOBIN: 11.2 G/DL (ref 12.5–16.5)
INR BLD: 1.2
KLEBSIELLA OXYTOCA BY PCR: NOT DETECTED
KLEBSIELLA PNEUMONIAE GROUP BY PCR: NOT DETECTED
LISTERIA MONOCYTOGENES BY PCR: NOT DETECTED
MAGNESIUM: 1.9 MG/DL (ref 1.6–2.6)
MCH RBC QN AUTO: 29.7 PG (ref 26–35)
MCHC RBC AUTO-ENTMCNC: 32.9 % (ref 32–34.5)
MCV RBC AUTO: 90.2 FL (ref 80–99.9)
METER GLUCOSE: 107 MG/DL (ref 74–99)
METER GLUCOSE: 173 MG/DL (ref 74–99)
METER GLUCOSE: 91 MG/DL (ref 74–99)
METER GLUCOSE: 91 MG/DL (ref 74–99)
METHICILLIN RESISTANCE MECA/C  BY PCR: DETECTED
NEISSERIA MENINGITIDIS BY PCR: NOT DETECTED
ORDER NUMBER: ABNORMAL
PDW BLD-RTO: 14.3 FL (ref 11.5–15)
PLATELET # BLD: 663 E9/L (ref 130–450)
PMV BLD AUTO: 9.2 FL (ref 7–12)
POTASSIUM REFLEX MAGNESIUM: 3 MMOL/L (ref 3.5–5)
PROTEUS SPECIES BY PCR: NOT DETECTED
PROTHROMBIN TIME: 13.6 SEC (ref 9.3–12.4)
PSEUDOMONAS AERUGINOSA BY PCR: NOT DETECTED
RBC # BLD: 3.77 E12/L (ref 3.8–5.8)
SEDIMENTATION RATE, ERYTHROCYTE: 50 MM/HR (ref 0–15)
SERRATIA MARCESCENS BY PCR: NOT DETECTED
SODIUM BLD-SCNC: 142 MMOL/L (ref 132–146)
SOURCE OF BLOOD CULTURE: ABNORMAL
STAPHYLOCOCCUS AUREUS BY PCR: NOT DETECTED
STAPHYLOCOCCUS SPECIES BY PCR: DETECTED
STREPTOCOCCUS AGALACTIAE BY PCR: NOT DETECTED
STREPTOCOCCUS PNEUMONIAE BY PCR: NOT DETECTED
STREPTOCOCCUS PYOGENES  BY PCR: NOT DETECTED
STREPTOCOCCUS SPECIES BY PCR: NOT DETECTED
TOTAL PROTEIN: 6.4 G/DL (ref 6.4–8.3)
WBC # BLD: 10 E9/L (ref 4.5–11.5)

## 2021-09-01 PROCEDURE — 99232 SBSQ HOSP IP/OBS MODERATE 35: CPT | Performed by: PHYSICIAN ASSISTANT

## 2021-09-01 PROCEDURE — 36415 COLL VENOUS BLD VENIPUNCTURE: CPT

## 2021-09-01 PROCEDURE — 6360000002 HC RX W HCPCS: Performed by: SPECIALIST

## 2021-09-01 PROCEDURE — 82962 GLUCOSE BLOOD TEST: CPT

## 2021-09-01 PROCEDURE — 83735 ASSAY OF MAGNESIUM: CPT

## 2021-09-01 PROCEDURE — 85027 COMPLETE CBC AUTOMATED: CPT

## 2021-09-01 PROCEDURE — 2580000003 HC RX 258: Performed by: SPECIALIST

## 2021-09-01 PROCEDURE — 6370000000 HC RX 637 (ALT 250 FOR IP): Performed by: NURSE PRACTITIONER

## 2021-09-01 PROCEDURE — 6370000000 HC RX 637 (ALT 250 FOR IP): Performed by: SURGERY

## 2021-09-01 PROCEDURE — 2580000003 HC RX 258: Performed by: NURSE PRACTITIONER

## 2021-09-01 PROCEDURE — 6370000000 HC RX 637 (ALT 250 FOR IP): Performed by: FAMILY MEDICINE

## 2021-09-01 PROCEDURE — 2500000003 HC RX 250 WO HCPCS: Performed by: SURGERY

## 2021-09-01 PROCEDURE — 1200000000 HC SEMI PRIVATE

## 2021-09-01 PROCEDURE — 6360000002 HC RX W HCPCS: Performed by: SURGERY

## 2021-09-01 PROCEDURE — 85610 PROTHROMBIN TIME: CPT

## 2021-09-01 PROCEDURE — 80053 COMPREHEN METABOLIC PANEL: CPT

## 2021-09-01 RX ORDER — OXYCODONE HYDROCHLORIDE AND ACETAMINOPHEN 5; 325 MG/1; MG/1
1 TABLET ORAL EVERY 4 HOURS PRN
Status: DISCONTINUED | OUTPATIENT
Start: 2021-09-01 | End: 2021-09-05 | Stop reason: HOSPADM

## 2021-09-01 RX ORDER — WARFARIN SODIUM 7.5 MG/1
7.5 TABLET ORAL
Status: COMPLETED | OUTPATIENT
Start: 2021-09-01 | End: 2021-09-01

## 2021-09-01 RX ADMIN — DULOXETINE HYDROCHLORIDE 30 MG: 30 CAPSULE, DELAYED RELEASE ORAL at 08:45

## 2021-09-01 RX ADMIN — PANTOPRAZOLE SODIUM 40 MG: 40 TABLET, DELAYED RELEASE ORAL at 08:47

## 2021-09-01 RX ADMIN — CYCLOBENZAPRINE 10 MG: 10 TABLET, FILM COATED ORAL at 22:04

## 2021-09-01 RX ADMIN — TERBINAFINE 250 MG: 250 TABLET ORAL at 09:45

## 2021-09-01 RX ADMIN — Medication: at 22:04

## 2021-09-01 RX ADMIN — PREGABALIN 75 MG: 75 CAPSULE ORAL at 08:46

## 2021-09-01 RX ADMIN — OXYCODONE AND ACETAMINOPHEN 1 TABLET: 5; 325 TABLET ORAL at 18:03

## 2021-09-01 RX ADMIN — VANCOMYCIN HYDROCHLORIDE 1250 MG: 10 INJECTION, POWDER, LYOPHILIZED, FOR SOLUTION INTRAVENOUS at 11:17

## 2021-09-01 RX ADMIN — HYDROXYUREA 500 MG: 500 CAPSULE ORAL at 08:47

## 2021-09-01 RX ADMIN — INSULIN GLARGINE 25 UNITS: 100 INJECTION, SOLUTION SUBCUTANEOUS at 22:03

## 2021-09-01 RX ADMIN — LISINOPRIL 5 MG: 10 TABLET ORAL at 08:45

## 2021-09-01 RX ADMIN — OXYCODONE AND ACETAMINOPHEN 1 TABLET: 5; 325 TABLET ORAL at 02:01

## 2021-09-01 RX ADMIN — LOPERAMIDE HYDROCHLORIDE 2 MG: 2 CAPSULE ORAL at 08:46

## 2021-09-01 RX ADMIN — TERBINAFINE 250 MG: 250 TABLET ORAL at 00:24

## 2021-09-01 RX ADMIN — Medication 10 ML: at 10:16

## 2021-09-01 RX ADMIN — DAPTOMYCIN 600 MG: 500 INJECTION, POWDER, LYOPHILIZED, FOR SOLUTION INTRAVENOUS at 18:50

## 2021-09-01 RX ADMIN — INSULIN LISPRO 1 UNITS: 100 INJECTION, SOLUTION INTRAVENOUS; SUBCUTANEOUS at 13:04

## 2021-09-01 RX ADMIN — CYCLOBENZAPRINE 10 MG: 10 TABLET, FILM COATED ORAL at 08:45

## 2021-09-01 RX ADMIN — FERROUS SULFATE TAB 325 MG (65 MG ELEMENTAL FE) 325 MG: 325 (65 FE) TAB at 22:05

## 2021-09-01 RX ADMIN — OXYCODONE AND ACETAMINOPHEN 1 TABLET: 5; 325 TABLET ORAL at 14:05

## 2021-09-01 RX ADMIN — METOPROLOL SUCCINATE 25 MG: 25 TABLET, FILM COATED, EXTENDED RELEASE ORAL at 08:47

## 2021-09-01 RX ADMIN — FENOFIBRATE 54 MG: 54 TABLET ORAL at 08:46

## 2021-09-01 RX ADMIN — HYDROXYUREA 500 MG: 500 CAPSULE ORAL at 22:04

## 2021-09-01 RX ADMIN — WARFARIN SODIUM 7.5 MG: 7.5 TABLET ORAL at 18:03

## 2021-09-01 RX ADMIN — FERROUS SULFATE TAB 325 MG (65 MG ELEMENTAL FE) 325 MG: 325 (65 FE) TAB at 08:47

## 2021-09-01 RX ADMIN — Medication: at 10:17

## 2021-09-01 RX ADMIN — ENOXAPARIN SODIUM 135 MG: 150 INJECTION SUBCUTANEOUS at 22:03

## 2021-09-01 RX ADMIN — OXYCODONE AND ACETAMINOPHEN 1 TABLET: 5; 325 TABLET ORAL at 22:04

## 2021-09-01 RX ADMIN — PREGABALIN 75 MG: 75 CAPSULE ORAL at 22:04

## 2021-09-01 RX ADMIN — ENOXAPARIN SODIUM 135 MG: 150 INJECTION SUBCUTANEOUS at 08:47

## 2021-09-01 RX ADMIN — Medication 2000 MG: at 06:03

## 2021-09-01 RX ADMIN — CYCLOBENZAPRINE 10 MG: 10 TABLET, FILM COATED ORAL at 14:05

## 2021-09-01 RX ADMIN — Medication 10 ML: at 22:04

## 2021-09-01 RX ADMIN — Medication 2000 MG: at 14:05

## 2021-09-01 RX ADMIN — OXYCODONE AND ACETAMINOPHEN 1 TABLET: 5; 325 TABLET ORAL at 10:09

## 2021-09-01 ASSESSMENT — PAIN DESCRIPTION - DESCRIPTORS: DESCRIPTORS: ACHING;STABBING;THROBBING

## 2021-09-01 ASSESSMENT — PAIN DESCRIPTION - ORIENTATION: ORIENTATION: LEFT

## 2021-09-01 ASSESSMENT — PAIN SCALES - GENERAL
PAINLEVEL_OUTOF10: 8
PAINLEVEL_OUTOF10: 7
PAINLEVEL_OUTOF10: 0

## 2021-09-01 ASSESSMENT — PAIN DESCRIPTION - FREQUENCY: FREQUENCY: CONTINUOUS

## 2021-09-01 ASSESSMENT — PAIN DESCRIPTION - ONSET: ONSET: ON-GOING

## 2021-09-01 ASSESSMENT — PAIN DESCRIPTION - PAIN TYPE: TYPE: ACUTE PAIN

## 2021-09-01 NOTE — PROGRESS NOTES
Hospitalist Progress Note      SYNOPSIS: Patient admitted on 2021     Mr. Jamari Al, a 50y.o. year old male  who  has a past medical history of Accident, Acute thrombosis of inferior vena cava (HCC), SIMÓN (acute kidney injury) ([facility] [address] Allergic rhinitis, Chronic back pain, Depression, Diabetes mellitus ([facility] [address] Difficulty sleeping, Displacement of lumbar intervertebral disc without myelopathy, Fibromyalgia, Fractured rib, H/O seasonal allergies, Head injury, Hyperlipidemia, Hypertension, Obesity (BMI 35.0-39.9 without comorbidity), Osteoarthritis, and Thoracic or lumbosacral neuritis or radiculitis, unspecified. Patient presented to the ED with LLE pain, redness, and edema x 3 days. He has a history of DVTs and PEs for which he is anticoagulated on Coumadin. He reports he is compliant though he is not compliant with INR checks. He has not had his INR checked since  though he states he's been checking it at his friends house. He stated he last checked it 3 weeks ago and it was 3.2. He denies any chest pain, SOB, fevers, N/V, or prolonged sedentary periods.      ER COURSE:  In ED patients vitals remained stable. Laboratory work-up significant for hypokalemia-3.4, leukocytosis-12.0, and elevated troponin-16. Ultrasound of the left lower extremity revealed a left iliac and common femoral vein occlusive thrombus.       SUBJECTIVE:    Patient seen and examined  Records reviewed. Temp (24hrs), Av.1 °F (36.7 °C), Min:97.7 °F (36.5 °C), Max:98.4 °F (36.9 °C)    DIET: ADULT DIET;  Regular; 3 carb choices (45 gm/meal)  CODE: Full Code    Intake/Output Summary (Last 24 hours) at 2021 0901  Last data filed at 2021 0606  Gross per 24 hour   Intake 720 ml   Output --   Net 720 ml       OBJECTIVE:    /83   Pulse 87   Temp 97.7 °F (36.5 °C) (Temporal)   Resp 18   Ht 6' 2\" (1.88 m)   Wt 290 lb (131.5 kg)   SpO2 96%   BMI 37.23 kg/m²     General appearance: No apparent distress, appears stated age and cooperative. HEENT:  Conjunctivae/corneas clear. Neck: Supple. No jugular venous distention. Respiratory: Clear to auscultation bilaterally, normal respiratory effort  Cardiovascular: Regular rate rhythm, normal S1-S2  Abdomen: Soft, nontender, nondistended  Musculoskeletal: No clubbing, cyanosis, no bilateral lower extremity edema. Brisk capillary refill. Skin:  Healed cicatrix bilateral dorsal foot and medial ankle from surgery 2019. Proximal streaking, erythema, warmth left lower leg with raised, soft, erythematous nodules.   Neurologic: awake, alert and following commands     ASSESSMENT:  Lymphangitis left lower extremity  Lower extremity edema  Occlusive DVT left lower extremity  Hypertension  Insulin-dependent diabetes mellitus  Hyperlipidemia  Peripheral neuropathy     PLAN:  Vascular following  Infectious disease following  Continue Ancef, econazole, vancomycin, Lamisil  Warfarin with Lovenox bridging  Low-dose correction insulin  Continue home medications    DISPOSITION:     Medications:  REVIEWED DAILY    Infusion Medications    sodium chloride      dextrose       Scheduled Medications    vancomycin  1,250 mg IntraVENous Q12H    ceFAZolin  2,000 mg IntraVENous Q8H    terbinafine  250 mg Oral Daily    miconazole nitrate   Topical BID    enoxaparin  1 mg/kg SubCUTAneous BID    cyclobenzaprine  10 mg Oral TID    DULoxetine  30 mg Oral Daily    fenofibrate  54 mg Oral Daily    ferrous sulfate  325 mg Oral BID    hydroxyurea  500 mg Oral BID    insulin glargine  25 Units SubCUTAneous Nightly    lisinopril  5 mg Oral Daily    metoprolol succinate  25 mg Oral Daily    pantoprazole  40 mg Oral Daily    pravastatin  20 mg Oral Nightly    sodium chloride flush  5-40 mL IntraVENous 2 times per day    pregabalin  75 mg Oral BID    warfarin (COUMADIN) daily dosing (placeholder)   Other RX Placeholder    insulin lispro  0-6 Units SubCUTAneous TID     insulin lispro 0-3 Units SubCUTAneous Nightly     PRN Meds: melatonin, loperamide, sodium chloride flush, sodium chloride, ondansetron **OR** ondansetron, polyethylene glycol, acetaminophen **OR** acetaminophen, potassium chloride **OR** potassium alternative oral replacement **OR** potassium chloride, magnesium sulfate, oxyCODONE-acetaminophen, glucose, dextrose, glucagon (rDNA), dextrose    Labs:     Recent Labs     08/30/21 2252 09/01/21  0447   WBC 12.0* 10.0   HGB 12.4* 11.2*   HCT 37.0 34.0*   * 663*       Recent Labs     08/30/21 2252 09/01/21  0447    142   K 3.4* 3.0*    108*   CO2 28 24   BUN 9 8   CREATININE 0.8 0.7   CALCIUM 9.2 8.7       Recent Labs     08/30/21 2252 09/01/21 0447   PROT 7.4 6.4   ALKPHOS 177* 141*   ALT 47* 32   AST 26 19   BILITOT 0.2 <0.2       Recent Labs     08/30/21 2252 09/01/21  0447   INR 1.6 1.2       No results for input(s): [place] in the last 72 hours. Chronic labs:    Lab Results   Component Value Date    CHOL 154 03/29/2021    TRIG 202 (H) 03/29/2021    HDL 38 03/29/2021    LDLCALC 76 03/29/2021    TSH 1.650 03/29/2021    INR 1.2 09/01/2021    LABA1C 6.5 03/29/2021       Radiology: REVIEWED DAILY    +++++++++++++++++++++++++++++++++++++++++++++++++  DO Abilio Montemayor Physician - [address] [facility], [place]  +++++++++++++++++++++++++++++++++++++++++++++++++  NOTE: This report was transcribed using voice recognition software. Every effort was made to ensure accuracy; however, inadvertent computerized transcription errors may be present.

## 2021-09-01 NOTE — PROGRESS NOTES
[address] [place] [address] [facility]  Progress Note      Chief Complaint   Patient presents with    Leg Pain     redness, swelling, pain to LLE, +thinners, hx DVT       SUBJECTIVE:  Positive GPC clusters bacteremia  Patient is tolerating medications. No reported adverse drug reactions. INR 1.2 today. Unchanged cellulitis and pain LLE. ESR 50, CRP 4.0. Has bilateral hip replacements and has neck hardware. No nausea, vomiting. Reports chronic intermittent diarrhea for last 2 years follow colon resection. Microbiology:   3/21/2016:   - Nares MRSA  10/11/2016:   - Nares MRSA  6/30/2016:   - Foot MRSA, Pantoea spp  - Blood no growth  7/31/2020  - Blood no growth  8/1/2020  - Coccyx ulcer Corynebacterium, Psuedomonas aeruginosa   8/3/2020  - Gall Bladder fluid no growth  9/17/2020  - Blood no growth  8/31/2021  - Blood culture Gram positive cocci in clusters    Review of systems:  As stated above in the chief complaint, otherwise negative.     Medications:  Scheduled Meds:   ceFAZolin  2,000 mg IntraVENous Q8H    terbinafine  250 mg Oral Daily    miconazole nitrate   Topical BID    enoxaparin  1 mg/kg SubCUTAneous BID    cyclobenzaprine  10 mg Oral TID    DULoxetine  30 mg Oral Daily    fenofibrate  54 mg Oral Daily    ferrous sulfate  325 mg Oral BID    hydroxyurea  500 mg Oral BID    insulin glargine  25 Units SubCUTAneous Nightly    lisinopril  5 mg Oral Daily    metoprolol succinate  25 mg Oral Daily    pantoprazole  40 mg Oral Daily    pravastatin  20 mg Oral Nightly    sodium chloride flush  5-40 mL IntraVENous 2 times per day    pregabalin  75 mg Oral BID    vancomycin  2,000 mg IntraVENous Q12H    warfarin (COUMADIN) daily dosing (placeholder)   Other RX Placeholder    insulin lispro  0-6 Units SubCUTAneous TID     insulin lispro  0-3 Units SubCUTAneous Nightly     Continuous Infusions:   sodium chloride      dextrose       PRN Meds:melatonin, loperamide, sodium chloride flush, sodium chloride, ondansetron **OR** ondansetron, polyethylene glycol, acetaminophen **OR** acetaminophen, potassium chloride **OR** potassium alternative oral replacement **OR** potassium chloride, magnesium sulfate, oxyCODONE-acetaminophen, glucose, dextrose, glucagon (rDNA), dextrose    OBJECTIVE:  /83   Pulse 87   Temp 97.7 °F (36.5 °C) (Temporal)   Resp 18   Ht 6' 2\" (1.88 m)   Wt 290 lb (131.5 kg)   SpO2 96%   BMI 37.23 kg/m²   Temp  Av.1 °F (36.7 °C)  Min: 97.7 °F (36.5 °C)  Max: 98.4 °F (36.9 °C)  Constitutional: The patient is awake, alert, and oriented. Skin:  Healed cicatrix bilateral dorsal foot and medial ankle from surgery 2019. Proximal streaking left upper leg, blanchable erythema, warmth left lower leg with raised, soft, erythematous nodules. No jaundice. HEENT: Round and reactive pupils. Moist mucous membranes. No ulcerations or thrush. Neck: Supple to movements. Chest: No use of accessory muscles to breathe. Symmetrical expansion. No wheezing, crackles or rhonchi. Cardiovascular: S1 and S2 are rhythmic and regular. No murmurs appreciated. Abdomen: Positive bowel sounds to auscultation. Benign to palpation. No masses felt. No hepatosplenomegaly.   Genitourinary: No hematuria, no dysuria, no urgency/frequency  Extremities: Erythema, proximal streaking left upper leg, +2 pitting edema LLE  Lines: peripheral    Laboratory and Tests Review:  Lab Results   Component Value Date    WBC 10.0 2021    WBC 12.0 (H) 2021    WBC 20.6 (H) 2021    HGB 11.2 (L) 2021    HCT 34.0 (L) 2021    MCV 90.2 2021     (H) 2021     Lab Results   Component Value Date    NEUTROABS 8.72 (H) 2021    NEUTROABS 17.83 (H) 2021    NEUTROABS 4.98 2021     No results found for: [facility]  Lab Results   Component Value Date    ALT 32 2021    AST 19 2021    ALKPHOS 141 (H) 2021    BILITOT <0.2 2021     Lab Results   Component Value Date     09/01/2021    K 3.0 09/01/2021     09/01/2021    CO2 24 09/01/2021    BUN 8 09/01/2021    CREATININE 0.7 09/01/2021    CREATININE 0.8 08/30/2021    CREATININE 1.7 04/23/2021    GFRAA >60 09/01/2021    LABGLOM >60 09/01/2021    GLUCOSE 118 09/01/2021    GLUCOSE 125 05/11/2012    PROT 6.4 09/01/2021    LABALBU 3.5 09/01/2021    LABALBU 4.8 05/11/2012    CALCIUM 8.7 09/01/2021    BILITOT <0.2 09/01/2021    ALKPHOS 141 09/01/2021    AST 19 09/01/2021    ALT 32 09/01/2021     Lab Results   Component Value Date    CRP 4.0 (H) 08/31/2021    CRP 16.9 (H) 10/10/2020    CRP 13.4 (H) 09/24/2020     Lab Results   Component Value Date    SEDRATE 50 (H) 08/31/2021    SEDRATE 95 (H) 09/24/2020    SEDRATE 28 (H) 06/30/2019     Radiology:      Microbiology:   Lab Results   Component Value Date    BC 5 Days no growth 09/17/2020    BC 5 Days no growth 07/31/2020    BC 5 Days- no growth 06/30/2019    ORG Corynebacterium species 08/01/2020    ORG Pseudomonas aeruginosa 08/01/2020    ORG Staphylococcus aureus 06/30/2019    ORG Pantoea species 06/30/2019     Lab Results   Component Value Date    BLOODCULT2 5 Days no growth 07/31/2020    BLOODCULT2 5 Days- no growth 06/30/2019    ORG Corynebacterium species 08/01/2020    ORG Pseudomonas aeruginosa 08/01/2020    ORG Staphylococcus aureus 06/30/2019    ORG Pantoea species 06/30/2019     WOUND/ABSCESS   Date Value Ref Range Status   08/01/2020 Heavy growth  Final   08/01/2020 Rare growth  Final   06/30/2019 (A)  Corrected    Mixed jf isolated. Further workup and sensitivity testing  is not routinely indicated and will not be performed. Mixed jf isolated includes:  Gram negative rods  Alpha hemolytic Strep species  Physician requested gram negative margaret workup     06/30/2019   Corrected    Heavy growth  Methicillin resistant Staph aureus isolated.  Most Methicillin  resistant Staphylococcus are usually resistant to multiple  antibiotics including other B-Lactams, Aminoglycosides,  Macrolides, Clindamycin and Tetracycline. Contact isolation  is indicated. 06/30/2019 Light growth  Corrected     No results found for: RESPSMEAR  No results found for: MPNEUMO, CLAMYDCU, LABLEGI, AFBCX, FUNGSM, LABFUNG  No results found for: CULTRESP  No results found for: CXCATHTIP  Body Fluid Culture, Sterile   Date Value Ref Range Status   08/03/2020 Growth not present  Final     No results found for: CXSURG  Creatinine Ur POCT   Date Value Ref Range Status   10/21/2019 200mg/dL  Final   08/22/2018 300  Final     MRSA Culture Only   Date Value Ref Range Status   09/17/2020 Methicillin resistant Staph aureus not isolated  Final   10/19/2016 Methicillin resistant Staph aureus not isolated  Final   10/11/2016 Heavy growth  Final       ASSESSMENT:  · Gram positive bacteremia  · Lymphangitis Left lower extremity  · Lower extremity edema left leg  · Occlusive DVT left leg     PLAN:      · Stop vanc, Ancef.  Start Daptomycin   · Cont miconazole, lamisil  · Check final cultures - growing Methicillin resistant Staph spp  · Baseline ESR, CRP  · Repeat blood cultures x 2 today (9/1) and tomorrow (9/3)  · Monitor labs  · Will follow with you    Jayy Bishop MD  8:58 AM  9/1/2021

## 2021-09-01 NOTE — PROGRESS NOTES
Pharmacy Consultation Note  (Warfarin Dosing and Monitoring)    Initial consult date: 08/31/2021  Consulting physician: MERT Granados    Allergies:  Seasonal and Tramadol    50 y.o. male    Ht Readings from Last 1 Encounters:   08/31/21 6' 2\" (1.88 m)     Wt Readings from Last 1 Encounters:   08/31/21 290 lb (131.5 kg)         Warfarin Indication Target   INR Range Home Dose  (if applicable) Diet/Feeding Tube   (Enteral feeds, nutritional drinks, increased Vitamin K in diet can decrease INR)   DVT/PE 2-3 7.5 mg daily Regular       x Home Med? Meds Increasing INR x Home Med?  Meds Decreasing INR     Allopurinol    Azathioprine     Amiodarone/Propafenone/Dronedarone   Carbamazepine     Androgens   Cholestyramine     Chemotherapy (BBW: Capecitabine)   Estrogen     Ciprofloxacin/Levofloxacin   Nafcillin/Dicloxacillin     Clarithromycin/Erythromycin/Azithromycin   Barbiturates      Fluconazole/Itraconazole/Voriconazole/Ketoconazole   Phenytoin (Variable)     Metronidazole   Rifampin     Phenytoin (Variable)   Steroids (Variable/Dose Dependent)   X Y  Statins/Fenofibrate/Gemfibrozil   Sucralfate     Steroids (Variable/Dose Dependent)   Other:     Sulfamethoxazole/Trimethoprim        Tramadol         Other:        Comments regarding medication interactions:      x Diseases Affecting INR x Increased Bleeding Risk    CHF Exacerbation (Increases)  History GI Bleed/PUD    Liver Disease (Increases)  Chronic NSAID Use    Thyroid: Hyper (Increases)  Hypo (Decreases)  Chronic ASA/Antiplatelet Use (Clopidogrel/ Dipyridamole/Prasugrel/Ticagrelor)      Malignancy (Increases)  Abnormal Renal Function (dialysis, renal transplant, SCr ? 2.3 mg/dL)     History of EtOH Abuse: Acute (Increases)   Chronic (Decreases)  Liver Function (cirrhosis, bilirubin >2x ULN with AST/ALT/AP >3x ULN)    Fever (Increases)  Age > 65 years    Acute infection (Increases)  Hypertension/Uncontrolled BP    Diarrhea/Dehydration (Increases)  History of stroke    Other: __________________  Other:___________________       Vitamin K or Blood product  Administration Date                    TSH:    Lab Results   Component Value Date    TSH 1.650 03/29/2021        Hepatic Function Panel:                            Lab Results   Component Value Date    ALKPHOS 141 09/01/2021    ALT 32 09/01/2021    AST 19 09/01/2021    PROT 6.4 09/01/2021    BILITOT <0.2 09/01/2021    BILIDIR <0.2 09/22/2020    IBILI see below 09/22/2020    LABALBU 3.5 09/01/2021    LABALBU 4.8 05/11/2012       Date Warfarin Dose INR Heparin or LMWH HGB/HCT PLT Comment   8/31 5 mg 1.6 Enoxaparin 135 mg SubQ BID 12.4/37.0 651    9/01 7.5 mg 1.2 Enoxaparin 135 mg SubQ BID 11.2/34.0 663                                 Assessment:  · Patient is a 49 yo male   · Patient is on warfarin for history of recurrent DVT, s/p vena cava filter and recommended life long anticoagulation. · Patient reports compliance with warfarin but non-compliance with INR checks,  reports INR taken 3 weeks ago 3.2, states he has been checking at his friends house. · Goal INR 2-3, 2.5 per vascular notes  · 9/1: INR 1.2 today, plan to bridge enoxaparin until PT/INR therapeutic per vascular notes.  Resume home dosing     Plan  · Warfarin 7.5 mg tonight  · Daily PT/INR until the INR is stable within the therapeutic range  · Pharmacist will follow and monitor/adjust dosing as necessary    Thank you for this consult,    Lakshmi StewartD   Pharmacy Resident   Phone: [phone]   9/1/2021 6:27 AM

## 2021-09-01 NOTE — PROGRESS NOTES
Pharmacy Consultation Note  (Antibiotic Dosing and Monitoring)    Initial consult date: 08/31/2021  Consulting physician/provider: Dr. Ryder Suero  Drug: Vancomycin  Indication: Skin and soft tissue infection - LLE cellulitis    Age/  Gender Height Weight IBW  Allergy Information   45 y.o./male 6' 2\" (188 cm) 290 lb (131.5 kg)     Ideal body weight: 82.2 kg (181 lb 3.5 oz)  Adjusted ideal body weight: 101.9 kg (224 lb 11.7 oz)   Seasonal and Tramadol      Renal Function:  Recent Labs     08/30/21  2252   BUN 9   CREATININE 0.8       Intake/Output Summary (Last 24 hours) at 9/1/2021 0605  Last data filed at 8/31/2021 2307  Gross per 24 hour   Intake 480 ml   Output --   Net 480 ml       Vancomycin Monitoring:  Trough:  No results for input(s): VANCOTROUGH in the last 72 hours. Random:  No results for input(s): VANCORANDOM in the last 72 hours. Vancomycin Administration Times:  Recent vancomycin administrations                   vancomycin (VANCOCIN) 2,000 mg in dextrose 5 % 500 mL IVPB (mg) 2,000 mg New Bag 08/31/21 2307                Assessment:  · Patient is a 50 y.o. male who has been initiated on vancomycin for LLE cellulitis. · Estimated Creatinine Clearance: 165 mL/min (based on SCr of 0.8 mg/dL). · To dose vancomycin, pharmacy will be utilizing [employer] calculation software for goal AUC/CARLA 400-600 mg/L-hr   · 09/01: ID consulted continue ancef, miconazole, terbinafine and add vancomycin pending cultures. Vancomycin 2000 mg IV q12h ordered by Dr. Ryder Suero one dose given on 08/31. Renal function stable. Plan:  · Will decrease to vancomycin 1250 mg q12h (predicted AUC/CARLA = 475, Tr = 15 mcg/mL). [vancomycin 2000 mg IV q12h (AUC/CARLA = 726, Tr = 24 mcg/mL)].   · Will check vancomycin levels when appropriate  · Will continue to monitor renal function   · Clinical pharmacy to follow    Sheng Ellis PharmD   Pharmacy Resident   Phone: [phone] 9/1/2021 6:05 AM

## 2021-09-01 NOTE — PROGRESS NOTES
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 States his pain to the left lower extremity is currently 7 out of 10. Notes that redness and swelling have been improving. 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 Analy Gay    Patient Active Problem List   Diagnosis Code    Neuropathic pain M79.2    Lumbar spondylosis M47.816    Osteoarthritis M19.90    Insomnia G47.00    Fibromyalgia M79.7    Vitamin D deficiency E55.9    Essential hypertension I10    Allergic rhinitis J30.9    Lumbar radiculopathy M54.16    Osteoarthritis of spine with radiculopathy, lumbar region M47.26    Class 1 obesity in adult E66.9    Lumbar disc herniation M51.26    Type 2 diabetes mellitus ([facility] [address] E11.9    Primary osteoarthritis of left hip M16.12    Primary osteoarthritis of right hip M16.11    Neuropathy G62.9    Left leg swelling M79.89    Rectus diastasis M62.08    Recurrent unilateral inguinal hernia K40.91    Abnormal findings on diagnostic imaging of gall bladder R93.2    Cyst of spleen D73.4    Other pulmonary embolism without acute cor pulmonale (HCC) I26.99    Thrombocytosis (HCC) D47.3    Acute blood loss anemia D62    Acute deep vein thrombosis (DVT) ([facility]) I82.409    Other hyperlipidemia E78.49    Tobacco dependence F17.200    Anticoagulated Z79.01    Cellulitis of left lower extremity L03. 116    Dermatophytosis B35.9    Lymphedema of left leg I89.0  Blister of left leg S80.822A    Recurrent acute deep vein thrombosis (DVT) of left lower extremity (HCC) I82.402    S/P IVC filter Z95.828    History of deep vein thrombosis Z86.718    Subtherapeutic international normalized ratio (INR) R79.1    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HCC) I82.220       Current Medications:    sodium chloride      dextrose        oxyCODONE-acetaminophen, melatonin, loperamide, sodium chloride flush, sodium chloride, ondansetron **OR** ondansetron, polyethylene glycol, acetaminophen **OR** acetaminophen, potassium chloride **OR** potassium alternative oral replacement **OR** potassium chloride, magnesium sulfate, glucose, dextrose, glucagon (rDNA), dextrose    vancomycin  1,250 mg IntraVENous Q12H    warfarin  7.5 mg Oral Once    ceFAZolin  2,000 mg IntraVENous Q8H    terbinafine  250 mg Oral Daily    miconazole nitrate   Topical BID    enoxaparin  1 mg/kg SubCUTAneous BID    cyclobenzaprine  10 mg Oral TID    DULoxetine  30 mg Oral Daily    fenofibrate  54 mg Oral Daily    ferrous sulfate  325 mg Oral BID    hydroxyurea  500 mg Oral BID    insulin glargine  25 Units SubCUTAneous Nightly    lisinopril  5 mg Oral Daily    metoprolol succinate  25 mg Oral Daily    pantoprazole  40 mg Oral Daily    pravastatin  20 mg Oral Nightly    sodium chloride flush  5-40 mL IntraVENous 2 times per day    pregabalin  75 mg Oral BID    warfarin (COUMADIN) daily dosing (placeholder)   Other RX Placeholder    insulin lispro  0-6 Units SubCUTAneous TID     insulin lispro  0-3 Units SubCUTAneous Nightly          PHYSICAL EXAM:    Vitals:    09/01/21 0836   BP: 119/83   Pulse: 87   Resp: 18   Temp: 97.7 °F (36.5 °C)   SpO2: 96%     CONSTITUTIONAL:  awake, alert, cooperative, no apparent distress  LUNGS:  no increased work of breathing, good air exchange  CARDIOVASCULAR:  regular rate and rhythm  ABDOMEN:  soft, non-distended and non-tender  LLE: 1-2+ pitting edema present, more significant in the foot. Tenderness to palpation throughout the leg.  +motor, sensation. erythema, warmth left lower leg with raised, soft, lightly erythematous areas.     LABS:    Lab Results   Component Value Date    WBC 10.0 09/01/2021    HGB 11.2 (L) 09/01/2021    HCT 34.0 (L) 09/01/2021     (H) 09/01/2021    PROTIME 13.6 (H) 09/01/2021    INR 1.2 09/01/2021    APTT 35.7 (H) 08/30/2021    K 3.0 (L) 09/01/2021    BUN 8 09/01/2021    CREATININE 0.7 09/01/2021       RADIOLOGY:

## 2021-09-02 LAB
BASOPHILS ABSOLUTE: 0.13 E9/L (ref 0–0.2)
BASOPHILS RELATIVE PERCENT: 1.4 % (ref 0–2)
EOSINOPHILS ABSOLUTE: 0.66 E9/L (ref 0.05–0.5)
EOSINOPHILS RELATIVE PERCENT: 6.9 % (ref 0–6)
HCT VFR BLD CALC: 36.1 % (ref 37–54)
HEMOGLOBIN: 11.7 G/DL (ref 12.5–16.5)
IMMATURE GRANULOCYTES #: 0.02 E9/L
IMMATURE GRANULOCYTES %: 0.2 % (ref 0–5)
INR BLD: 1.4
LYMPHOCYTES ABSOLUTE: 2.42 E9/L (ref 1.5–4)
LYMPHOCYTES RELATIVE PERCENT: 25.3 % (ref 20–42)
MCH RBC QN AUTO: 29.6 PG (ref 26–35)
MCHC RBC AUTO-ENTMCNC: 32.4 % (ref 32–34.5)
MCV RBC AUTO: 91.4 FL (ref 80–99.9)
METER GLUCOSE: 115 MG/DL (ref 74–99)
METER GLUCOSE: 116 MG/DL (ref 74–99)
METER GLUCOSE: 86 MG/DL (ref 74–99)
METER GLUCOSE: 97 MG/DL (ref 74–99)
MONOCYTES ABSOLUTE: 0.99 E9/L (ref 0.1–0.95)
MONOCYTES RELATIVE PERCENT: 10.4 % (ref 2–12)
NEUTROPHILS ABSOLUTE: 5.34 E9/L (ref 1.8–7.3)
NEUTROPHILS RELATIVE PERCENT: 55.8 % (ref 43–80)
PDW BLD-RTO: 14.6 FL (ref 11.5–15)
PLATELET # BLD: 696 E9/L (ref 130–450)
PMV BLD AUTO: 9.3 FL (ref 7–12)
PROTHROMBIN TIME: 15.4 SEC (ref 9.3–12.4)
RBC # BLD: 3.95 E12/L (ref 3.8–5.8)
WBC # BLD: 9.6 E9/L (ref 4.5–11.5)

## 2021-09-02 PROCEDURE — 87040 BLOOD CULTURE FOR BACTERIA: CPT

## 2021-09-02 PROCEDURE — 2580000003 HC RX 258: Performed by: NURSE PRACTITIONER

## 2021-09-02 PROCEDURE — 6370000000 HC RX 637 (ALT 250 FOR IP): Performed by: NURSE PRACTITIONER

## 2021-09-02 PROCEDURE — 6370000000 HC RX 637 (ALT 250 FOR IP)

## 2021-09-02 PROCEDURE — 6360000002 HC RX W HCPCS: Performed by: SURGERY

## 2021-09-02 PROCEDURE — 2580000003 HC RX 258: Performed by: SPECIALIST

## 2021-09-02 PROCEDURE — 6360000002 HC RX W HCPCS: Performed by: SPECIALIST

## 2021-09-02 PROCEDURE — 85025 COMPLETE CBC W/AUTO DIFF WBC: CPT

## 2021-09-02 PROCEDURE — 6370000000 HC RX 637 (ALT 250 FOR IP): Performed by: FAMILY MEDICINE

## 2021-09-02 PROCEDURE — 1200000000 HC SEMI PRIVATE

## 2021-09-02 PROCEDURE — 36415 COLL VENOUS BLD VENIPUNCTURE: CPT

## 2021-09-02 PROCEDURE — 82962 GLUCOSE BLOOD TEST: CPT

## 2021-09-02 PROCEDURE — 6370000000 HC RX 637 (ALT 250 FOR IP): Performed by: SURGERY

## 2021-09-02 PROCEDURE — 85610 PROTHROMBIN TIME: CPT

## 2021-09-02 RX ORDER — WARFARIN SODIUM 7.5 MG/1
7.5 TABLET ORAL
Status: COMPLETED | OUTPATIENT
Start: 2021-09-02 | End: 2021-09-02

## 2021-09-02 RX ADMIN — Medication: at 08:49

## 2021-09-02 RX ADMIN — PREGABALIN 75 MG: 75 CAPSULE ORAL at 21:31

## 2021-09-02 RX ADMIN — CYCLOBENZAPRINE 10 MG: 10 TABLET, FILM COATED ORAL at 14:13

## 2021-09-02 RX ADMIN — OXYCODONE AND ACETAMINOPHEN 1 TABLET: 5; 325 TABLET ORAL at 15:10

## 2021-09-02 RX ADMIN — PREGABALIN 75 MG: 75 CAPSULE ORAL at 08:57

## 2021-09-02 RX ADMIN — TERBINAFINE 250 MG: 250 TABLET ORAL at 08:48

## 2021-09-02 RX ADMIN — Medication 10 ML: at 08:49

## 2021-09-02 RX ADMIN — INSULIN GLARGINE 25 UNITS: 100 INJECTION, SOLUTION SUBCUTANEOUS at 21:37

## 2021-09-02 RX ADMIN — ENOXAPARIN SODIUM 135 MG: 150 INJECTION SUBCUTANEOUS at 21:31

## 2021-09-02 RX ADMIN — PRAVASTATIN SODIUM 20 MG: 20 TABLET ORAL at 21:31

## 2021-09-02 RX ADMIN — PRAVASTATIN SODIUM 20 MG: 20 TABLET ORAL at 00:43

## 2021-09-02 RX ADMIN — OXYCODONE AND ACETAMINOPHEN 1 TABLET: 5; 325 TABLET ORAL at 02:53

## 2021-09-02 RX ADMIN — OXYCODONE AND ACETAMINOPHEN 1 TABLET: 5; 325 TABLET ORAL at 06:54

## 2021-09-02 RX ADMIN — PANTOPRAZOLE SODIUM 40 MG: 40 TABLET, DELAYED RELEASE ORAL at 08:53

## 2021-09-02 RX ADMIN — FENOFIBRATE 54 MG: 54 TABLET ORAL at 08:48

## 2021-09-02 RX ADMIN — Medication 10 ML: at 21:32

## 2021-09-02 RX ADMIN — OXYCODONE AND ACETAMINOPHEN 1 TABLET: 5; 325 TABLET ORAL at 19:10

## 2021-09-02 RX ADMIN — FERROUS SULFATE TAB 325 MG (65 MG ELEMENTAL FE) 325 MG: 325 (65 FE) TAB at 08:48

## 2021-09-02 RX ADMIN — CYCLOBENZAPRINE 10 MG: 10 TABLET, FILM COATED ORAL at 21:31

## 2021-09-02 RX ADMIN — WARFARIN SODIUM 7.5 MG: 7.5 TABLET ORAL at 17:30

## 2021-09-02 RX ADMIN — CYCLOBENZAPRINE 10 MG: 10 TABLET, FILM COATED ORAL at 07:37

## 2021-09-02 RX ADMIN — HYDROXYUREA 500 MG: 500 CAPSULE ORAL at 21:31

## 2021-09-02 RX ADMIN — OXYCODONE AND ACETAMINOPHEN 1 TABLET: 5; 325 TABLET ORAL at 23:11

## 2021-09-02 RX ADMIN — FERROUS SULFATE TAB 325 MG (65 MG ELEMENTAL FE) 325 MG: 325 (65 FE) TAB at 21:31

## 2021-09-02 RX ADMIN — DULOXETINE HYDROCHLORIDE 30 MG: 30 CAPSULE, DELAYED RELEASE ORAL at 08:48

## 2021-09-02 RX ADMIN — METOPROLOL SUCCINATE 25 MG: 25 TABLET, FILM COATED, EXTENDED RELEASE ORAL at 08:54

## 2021-09-02 RX ADMIN — ENOXAPARIN SODIUM 135 MG: 150 INJECTION SUBCUTANEOUS at 08:49

## 2021-09-02 RX ADMIN — LISINOPRIL 5 MG: 10 TABLET ORAL at 08:48

## 2021-09-02 RX ADMIN — HYDROXYUREA 500 MG: 500 CAPSULE ORAL at 08:48

## 2021-09-02 RX ADMIN — Medication: at 21:44

## 2021-09-02 RX ADMIN — OXYCODONE AND ACETAMINOPHEN 1 TABLET: 5; 325 TABLET ORAL at 11:02

## 2021-09-02 RX ADMIN — DAPTOMYCIN 600 MG: 500 INJECTION, POWDER, LYOPHILIZED, FOR SOLUTION INTRAVENOUS at 18:47

## 2021-09-02 ASSESSMENT — PAIN DESCRIPTION - ORIENTATION
ORIENTATION: LEFT

## 2021-09-02 ASSESSMENT — PAIN SCALES - GENERAL
PAINLEVEL_OUTOF10: 0
PAINLEVEL_OUTOF10: 7
PAINLEVEL_OUTOF10: 3
PAINLEVEL_OUTOF10: 6
PAINLEVEL_OUTOF10: 8
PAINLEVEL_OUTOF10: 7
PAINLEVEL_OUTOF10: 7
PAINLEVEL_OUTOF10: 0
PAINLEVEL_OUTOF10: 5
PAINLEVEL_OUTOF10: 6
PAINLEVEL_OUTOF10: 7

## 2021-09-02 ASSESSMENT — PAIN DESCRIPTION - ONSET
ONSET: ON-GOING

## 2021-09-02 ASSESSMENT — PAIN DESCRIPTION - FREQUENCY
FREQUENCY: CONTINUOUS

## 2021-09-02 ASSESSMENT — PAIN DESCRIPTION - DESCRIPTORS
DESCRIPTORS: ACHING;STABBING;THROBBING
DESCRIPTORS: ACHING;STABBING;THROBBING
DESCRIPTORS: ACHING;CONSTANT;DISCOMFORT;SHARP
DESCRIPTORS: SHARP;SHOOTING;ACHING;DISCOMFORT

## 2021-09-02 ASSESSMENT — PAIN - FUNCTIONAL ASSESSMENT: PAIN_FUNCTIONAL_ASSESSMENT: PREVENTS OR INTERFERES SOME ACTIVE ACTIVITIES AND ADLS

## 2021-09-02 ASSESSMENT — PAIN DESCRIPTION - LOCATION
LOCATION: ANKLE;FOOT;LEG
LOCATION: BACK;GROIN;LEG
LOCATION: LEG;ANKLE;FOOT
LOCATION: LEG;FOOT;ANKLE

## 2021-09-02 ASSESSMENT — PAIN SCALES - WONG BAKER
WONGBAKER_NUMERICALRESPONSE: 0
WONGBAKER_NUMERICALRESPONSE: 2

## 2021-09-02 ASSESSMENT — PAIN DESCRIPTION - PROGRESSION: CLINICAL_PROGRESSION: NOT CHANGED

## 2021-09-02 ASSESSMENT — PAIN DESCRIPTION - PAIN TYPE
TYPE: ACUTE PAIN

## 2021-09-02 NOTE — PROGRESS NOTES
Pharmacy Consultation Note  (Warfarin Dosing and Monitoring)     Initial consult date: 08/31/2021  Consulting physician: MERT Cotter     Allergies:  Seasonal and Tramadol     50 y.o. male         Ht Readings from Last 1 Encounters:   08/31/21 6' 2\" (1.88 m)          Wt Readings from Last 1 Encounters:   08/31/21 290 lb (131.5 kg)            Warfarin Indication Target   INR Range Home Dose  (if applicable) Diet/Feeding Tube   (Enteral feeds, nutritional drinks, increased Vitamin K in diet can decrease INR)   DVT/PE 2-3 7.5 mg daily Regular         x Home Med? Meds Increasing INR x Home Med?  Meds Decreasing INR       Allopurinol      Azathioprine       Amiodarone/Propafenone/Dronedarone     Carbamazepine       Androgens     Cholestyramine       Chemotherapy (BBW: Capecitabine)     Estrogen       Ciprofloxacin/Levofloxacin     Nafcillin/Dicloxacillin       Clarithromycin/Erythromycin/Azithromycin     Barbiturates        Fluconazole/Itraconazole/Voriconazole/Ketoconazole     Phenytoin (Variable)       Metronidazole     Rifampin       Phenytoin (Variable)     Steroids (Variable/Dose Dependent)   X Y  Statins/Fenofibrate/Gemfibrozil     Sucralfate       Steroids (Variable/Dose Dependent)     Other:       Sulfamethoxazole/Trimethoprim             Tramadol              Other:            Comments regarding medication interactions:        x Diseases Affecting INR x Increased Bleeding Risk     CHF Exacerbation (Increases)   History GI Bleed/PUD     Liver Disease (Increases)   Chronic NSAID Use     Thyroid: Hyper (Increases)  Hypo (Decreases)   Chronic ASA/Antiplatelet Use (Clopidogrel/ Dipyridamole/Prasugrel/Ticagrelor)       Malignancy (Increases)   Abnormal Renal Function (dialysis, renal transplant, SCr ? 2.3 mg/dL)      History of EtOH Abuse: Acute (Increases)   Chronic (Decreases)   Liver Function (cirrhosis, bilirubin >2x ULN with AST/ALT/AP >3x ULN)     Fever (Increases)   Age > 65 years     Acute infection

## 2021-09-02 NOTE — PROGRESS NOTES
Physician Progress Note      PATIENT:               Erich Manuel  CSN #:                  [id number]  :                       1975  ADMIT DATE:       2021 11:00 AM  DISCH DATE:  RESPONDING  PROVIDER #:        Benny Sommer MD          QUERY TEXT:    Pt admitted with Left lower leg cellulitis. Pt noted to have Diabetes type 2   insulin dependent, if documented). If possible, please document in progress   notes and discharge summary the relationship, if any, between cellulitis and   DM. The medical record reflects the following:  Risk Factors: Left lower leg (LLE) Cellulitis, Diabetes type 2 Insulin   dependent  Clinical Indicators: Cellulitis to left lower leg, + Blood Cultures with Gram   positive cocci in clusters, Glucose readings this visit-123 on , 118 on   , last visit on 21  glucose 188. Treatment: Lantus, Humalog, glucose monitoring, Cefazolin, Daptomycin,   Vancomycin, Consults, labs, imaging, cultures    Thank  You,  Tej PERKINS, RN, CDS  Clinical Documentation Improvement  [email or web address]  [phone]  Options provided:  -- Left lower leg cellulitis associated with Diabetes  -- Left lower leg cellulitis unrelated to Diabetes  -- Other - I will add my own diagnosis  -- Disagree - Not applicable / Not valid  -- Disagree - Clinically unable to determine / Unknown  -- Refer to Clinical Documentation Reviewer    PROVIDER RESPONSE TEXT:    Left lower leg cellulitis unrelated to Diabetes.     Query created by: Stan Ji on 2021 12:32 PM      Electronically signed by:  Benny Sommer MD 2021 5:34 PM

## 2021-09-02 NOTE — PROGRESS NOTES
Hospitalist Progress Note      SYNOPSIS: Patient admitted on 2021 for cellulitis of the LLE. He  Presented with a complaint of LLE redness, pain and swelling for 3 days prior to admission. He has a history of DVT and PE for which he is chronically anticoagulated on coumadin. He had not been compliant with his INR checks. On admission he was found to have left iliac and common femoral vein occlusive thrombosis. He has been managed for cellulitis of the left lower extremity as well as occlusive DVT of the left lower extremity. SUBJECTIVE:    Patient seen and examined. He says pain in his LLE is much better. HE denies any fever, chills, nausea or vomiting. Review of systems is otherwise negative. Records reviewed. Temp (24hrs), Av.1 °F (36.2 °C), Min:96.8 °F (36 °C), Max:97.5 °F (36.4 °C)    DIET: ADULT DIET; Regular; 3 carb choices (45 gm/meal)  CODE: Full Code    Intake/Output Summary (Last 24 hours) at 2021 1315  Last data filed at 2021 0040  Gross per 24 hour   Intake 480 ml   Output --   Net 480 ml       OBJECTIVE:    /71   Pulse 87   Temp 96.8 °F (36 °C) (Temporal)   Resp 16   Ht 6' 2\" (1.88 m)   Wt 290 lb (131.5 kg)   SpO2 96%   BMI 37.23 kg/m²     General appearance: No apparent distress, appears stated age and cooperative. HEENT:  Conjunctivae/corneas clear. Neck: Supple. No jugular venous distention. Respiratory: Clear to auscultation bilaterally, normal respiratory effort  Cardiovascular: Regular rate rhythm, normal S1-S2  Abdomen: Soft, nontender, nondistended  Musculoskeletal: LLE has 2+ edema, shiny, mildly tender to touch, with resolving blisters. Skin:  Resolving blisters on LLE  Neurologic: awake, alert and following commands     ASSESSMENT:  #Cellulitis of the LLE  -on daptomycin, ancef and econazole  -ID on board  -blood cultures growing MRSA. Will order 2D echo to assess for any valvular vegetation.   -patient also known to have cervical hardware as well as bilateral hip replacement    #DVT of the LLE  -on coumadin. INR today is 1.2. Being bridged with lovenox     #Type 2 diabetes mellitus complicated by peripheral neuropathy  -on lantus 25 units daily. -ISS. Accuchecks ACHS  -on pregabalin    #Hyperlipidemia: on fenofibrate    #Hypertension: on lisinopril and metoprolol    #Hypokalemia: K was 3 yesterday. DVT prophylaxis: not indicated as he is on treatment for DVT. DISPOSITION:to be determined.     Medications:  REVIEWED DAILY    Infusion Medications    sodium chloride      dextrose       Scheduled Medications    daptomycin (CUBICIN) IVPB  6 mg/kg (Adjusted) IntraVENous Q24H    terbinafine  250 mg Oral Daily    miconazole nitrate   Topical BID    enoxaparin  1 mg/kg SubCUTAneous BID    cyclobenzaprine  10 mg Oral TID    DULoxetine  30 mg Oral Daily    fenofibrate  54 mg Oral Daily    ferrous sulfate  325 mg Oral BID    hydroxyurea  500 mg Oral BID    insulin glargine  25 Units SubCUTAneous Nightly    lisinopril  5 mg Oral Daily    metoprolol succinate  25 mg Oral Daily    pantoprazole  40 mg Oral Daily    pravastatin  20 mg Oral Nightly    sodium chloride flush  5-40 mL IntraVENous 2 times per day    pregabalin  75 mg Oral BID    warfarin (COUMADIN) daily dosing (placeholder)   Other RX Placeholder    insulin lispro  0-6 Units SubCUTAneous TID WC    insulin lispro  0-3 Units SubCUTAneous Nightly     PRN Meds: oxyCODONE-acetaminophen, melatonin, loperamide, sodium chloride flush, sodium chloride, ondansetron **OR** ondansetron, polyethylene glycol, acetaminophen **OR** acetaminophen, potassium chloride **OR** potassium alternative oral replacement **OR** potassium chloride, magnesium sulfate, glucose, dextrose, glucagon (rDNA), dextrose    Labs:     Recent Labs     08/30/21  2252 09/01/21  0447 09/02/21  1015   WBC 12.0* 10.0 9.6   HGB 12.4* 11.2* 11.7*   HCT 37.0 34.0* 36.1*   * 663* 696*       Recent Labs 08/30/21 2252 09/01/21 0447    142   K 3.4* 3.0*    108*   CO2 28 24   BUN 9 8   CREATININE 0.8 0.7   CALCIUM 9.2 8.7       Recent Labs     08/30/21 2252 09/01/21 0447   PROT 7.4 6.4   ALKPHOS 177* 141*   ALT 47* 32   AST 26 19   BILITOT 0.2 <0.2       Recent Labs     08/30/21 2252 09/01/21 0447 09/02/21  0705   INR 1.6 1.2 1.4       No results for input(s): Isabela Lowers in the last 72 hours. Chronic labs:    Lab Results   Component Value Date    CHOL 154 03/29/2021    TRIG 202 (H) 03/29/2021    HDL 38 03/29/2021    LDLCALC 76 03/29/2021    TSH 1.650 03/29/2021    INR 1.4 09/02/2021    LABA1C 6.5 03/29/2021       Radiology: REVIEWED DAILY    +++++++++++++++++++++++++++++++++++++++++++++++++  Adelina Wood MD  [facility] - [address], [address]  +++++++++++++++++++++++++++++++++++++++++++++++++  NOTE: This report was transcribed using voice recognition software. Every effort was made to ensure accuracy; however, inadvertent computerized transcription errors may be present.

## 2021-09-02 NOTE — PROGRESS NOTES
[address] [place] [address] [facility]  LUDAIDA  Progress Note      Chief Complaint   Patient presents with    Leg Pain     redness, swelling, pain to LLE, +thinners, hx DVT       SUBJECTIVE:  Methicilin resistant staph species bacteremia  Patient is tolerating medications. No reported adverse drug reactions. INR 1.4 today. Improving cellulitis and pain LLE. Has bilateral hip replacements and has neck hardware. No pain or tenderness and hardware sites. No nausea, vomiting. Reports chronic intermittent diarrhea for last 2 years follow colon resection. His pain when he ambulates with the left leg    Microbiology:   3/21/2016:   - Nares MRSA  10/11/2016:   - Nares MRSA  6/30/2016:   - Foot MRSA, Pantoea spp  - Blood no growth  7/31/2020  - Blood no growth  8/1/2020  - Coccyx ulcer Corynebacterium, Psuedomonas aeruginosa   8/3/2020  - Gall Bladder fluid no growth  9/17/2020  - Blood no growth  8/31/2021  - Blood culture methicillin-resistant Staphylococcus species  9/2/2021  -Blood cultures pending    Review of systems:  As stated above in the chief complaint, otherwise negative.     Medications:  Scheduled Meds:   daptomycin (CUBICIN) IVPB  6 mg/kg (Adjusted) IntraVENous Q24H    terbinafine  250 mg Oral Daily    miconazole nitrate   Topical BID    enoxaparin  1 mg/kg SubCUTAneous BID    cyclobenzaprine  10 mg Oral TID    DULoxetine  30 mg Oral Daily    fenofibrate  54 mg Oral Daily    ferrous sulfate  325 mg Oral BID    hydroxyurea  500 mg Oral BID    insulin glargine  25 Units SubCUTAneous Nightly    lisinopril  5 mg Oral Daily    metoprolol succinate  25 mg Oral Daily    pantoprazole  40 mg Oral Daily    pravastatin  20 mg Oral Nightly    sodium chloride flush  5-40 mL IntraVENous 2 times per day    pregabalin  75 mg Oral BID    warfarin (COUMADIN) daily dosing (placeholder)   Other RX Placeholder    insulin lispro  0-6 Units SubCUTAneous TID WC    insulin lispro  0-3 Units SubCUTAneous Nightly     Continuous Infusions:   sodium chloride      dextrose       PRN Meds:oxyCODONE-acetaminophen, melatonin, loperamide, sodium chloride flush, sodium chloride, ondansetron **OR** ondansetron, polyethylene glycol, acetaminophen **OR** acetaminophen, potassium chloride **OR** potassium alternative oral replacement **OR** potassium chloride, magnesium sulfate, glucose, dextrose, glucagon (rDNA), dextrose    OBJECTIVE:  /71   Pulse 87   Temp 96.8 °F (36 °C) (Temporal)   Resp 16   Ht 6' 2\" (1.88 m)   Wt 290 lb (131.5 kg)   SpO2 96%   BMI 37.23 kg/m²   Temp  Av.1 °F (36.2 °C)  Min: 96.8 °F (36 °C)  Max: 97.5 °F (36.4 °C)  Constitutional: The patient is awake, alert, and oriented. Skin:  Healed cicatrix bilateral dorsal foot and medial ankle from surgery 2019. Proximal streaking left upper leg, improved erythema, warm left lower leg with raised, soft, erythematous nodules. No jaundice. HEENT: Round and reactive pupils. Moist mucous membranes. No ulcerations or thrush. Neck: Supple to movements. Chest: No use of accessory muscles to breathe. Symmetrical expansion. No wheezing, crackles or rhonchi. Cardiovascular: S1 and S2 are rhythmic and regular. No murmurs appreciated. Abdomen: Positive bowel sounds to auscultation. Benign to palpation. No masses felt. No hepatosplenomegaly.   Genitourinary: No hematuria, no dysuria, no urgency/frequency  Extremities: Erythema, proximal streaking left upper leg, +2 pitting edema LLE  Lines: peripheral    Laboratory and Tests Review:  Lab Results   Component Value Date    WBC 10.0 2021    WBC 12.0 (H) 2021    WBC 20.6 (H) 2021    HGB 11.2 (L) 2021    HCT 34.0 (L) 2021    MCV 90.2 2021     (H) 2021     Lab Results   Component Value Date    NEUTROABS 8.72 (H) 2021    NEUTROABS 17.83 (H) 2021    NEUTROABS 4.98 2021     No results found for: [facility]  Lab Results   Component Value Date ALT 32 09/01/2021    AST 19 09/01/2021    ALKPHOS 141 (H) 09/01/2021    BILITOT <0.2 09/01/2021     Lab Results   Component Value Date     09/01/2021    K 3.0 09/01/2021     09/01/2021    CO2 24 09/01/2021    BUN 8 09/01/2021    CREATININE 0.7 09/01/2021    CREATININE 0.8 08/30/2021    CREATININE 1.7 04/23/2021    GFRAA >60 09/01/2021    LABGLOM >60 09/01/2021    GLUCOSE 118 09/01/2021    GLUCOSE 125 05/11/2012    PROT 6.4 09/01/2021    LABALBU 3.5 09/01/2021    LABALBU 4.8 05/11/2012    CALCIUM 8.7 09/01/2021    BILITOT <0.2 09/01/2021    ALKPHOS 141 09/01/2021    AST 19 09/01/2021    ALT 32 09/01/2021     Lab Results   Component Value Date    CRP 4.0 (H) 08/31/2021    CRP 16.9 (H) 10/10/2020    CRP 13.4 (H) 09/24/2020     Lab Results   Component Value Date    SEDRATE 50 (H) 08/31/2021    SEDRATE 95 (H) 09/24/2020    SEDRATE 28 (H) 06/30/2019     Radiology:      Microbiology:   Lab Results   Component Value Date    [facility]  08/31/2021     Gram stain performed from blood culture bottle media  Gram positive cocci in clusters      [facility] Identification and sensitivity to follow 08/31/2021    BC 5 Days no growth 09/17/2020    ORG Staphylococcus species 08/31/2021    ORG Staphylococcus species 08/31/2021    ORG Corynebacterium species 08/01/2020    ORG Pseudomonas aeruginosa 08/01/2020     Lab Results   Component Value Date    BLOODCULT2  08/31/2021     Gram stain performed from blood culture bottle media  Gram positive cocci in clusters  Previously positive blood culture called      BLOODCULT2 Identification and sensitivity to follow 08/31/2021    BLOODCULT2 5 Days no growth 07/31/2020    ORG Staphylococcus species 08/31/2021    ORG Staphylococcus species 08/31/2021    ORG Corynebacterium species 08/01/2020    ORG Pseudomonas aeruginosa 08/01/2020     WOUND/ABSCESS   Date Value Ref Range Status   08/01/2020 Heavy growth  Final   08/01/2020 Rare growth  Final   06/30/2019 (A)  Corrected    Mixed jf isolated. Further workup and sensitivity testing  is not routinely indicated and will not be performed. Mixed jf isolated includes:  Gram negative rods  Alpha hemolytic Strep species  Physician requested gram negative margaret workup     06/30/2019   Corrected    Heavy growth  Methicillin resistant Staph aureus isolated. Most Methicillin  resistant Staphylococcus are usually resistant to multiple  antibiotics including other B-Lactams, Aminoglycosides,  Macrolides, Clindamycin and Tetracycline. Contact isolation  is indicated.      06/30/2019 Light growth  Corrected     No results found for: RESPSMEAR  No results found for: MPNEUMO, CLAMYDCU, LABLEGI, AFBCX, FUNGSM, LABFUNG  No results found for: CULTRESP  No results found for: CXCATHTIP  Body Fluid Culture, Sterile   Date Value Ref Range Status   08/03/2020 Growth not present  Final     No results found for: CXSURG  Creatinine Ur POCT   Date Value Ref Range Status   10/21/2019 200mg/dL  Final   08/22/2018 300  Final     MRSA Culture Only   Date Value Ref Range Status   09/17/2020 Methicillin resistant Staph aureus not isolated  Final   10/19/2016 Methicillin resistant Staph aureus not isolated  Final   10/11/2016 Heavy growth  Final       ASSESSMENT:  · Gram positive bacteremia  · Lymphangitis Left lower extremity  · Lower extremity edema left leg  · Occlusive DVT left leg     PLAN:      · Cont Daptomycin   · Check the echo report  · Cont miconazole, lamisil  · Check final cultures - growing Methicillin resistant Staph spp  · Baseline ESR, CRP  · Vasc continuing coumadin with therapeutic INR with compression and elevation of LLE, vasc signed off   · Repeat blood cultures x 2 (9/1) and (9/2)  · Monitor labs  · Will follow with you    Katie Gurrola MD  11:17 AM  9/2/2021

## 2021-09-02 NOTE — PROGRESS NOTES
Pharmacy Consultation Note  (Antibiotic Dosing and Monitoring)     Initial consult date: 08/31/2021  Consulting physician/provider: Dr. Petra Harris  Drug: Vancomycin  Indication: Skin and soft tissue infection - LLE cellulitis       Vancomycin has been discontinued by PEDRO Guallpa to Sonal Wise [facility] Physician to re-consult pharmacy if future dosing is needed    Thank you for the consult,    Gladys Hwang, PharmD   Pharmacy Resident   Phone: [phone]

## 2021-09-03 ENCOUNTER — APPOINTMENT (OUTPATIENT)
Dept: GENERAL RADIOLOGY | Age: 46
DRG: 383 | End: 2021-09-03
Payer: MEDICARE

## 2021-09-03 LAB
ANION GAP SERPL CALCULATED.3IONS-SCNC: 13 MMOL/L (ref 7–16)
BASOPHILS ABSOLUTE: 0.11 E9/L (ref 0–0.2)
BASOPHILS RELATIVE PERCENT: 1.2 % (ref 0–2)
BUN BLDV-MCNC: 8 MG/DL (ref 6–20)
CALCIUM SERPL-MCNC: 8.7 MG/DL (ref 8.6–10.2)
CHLORIDE BLD-SCNC: 106 MMOL/L (ref 98–107)
CO2: 23 MMOL/L (ref 22–29)
CREAT SERPL-MCNC: 0.8 MG/DL (ref 0.7–1.2)
CULTURE, BLOOD 2: ABNORMAL
EOSINOPHILS ABSOLUTE: 0.68 E9/L (ref 0.05–0.5)
EOSINOPHILS RELATIVE PERCENT: 7.7 % (ref 0–6)
GFR AFRICAN AMERICAN: >60
GFR NON-AFRICAN AMERICAN: >60 ML/MIN/1.73
GLUCOSE BLD-MCNC: 119 MG/DL (ref 74–99)
HCT VFR BLD CALC: 34.7 % (ref 37–54)
HEMOGLOBIN: 11.5 G/DL (ref 12.5–16.5)
IMMATURE GRANULOCYTES #: 0.03 E9/L
IMMATURE GRANULOCYTES %: 0.3 % (ref 0–5)
INR BLD: 1.9
LYMPHOCYTES ABSOLUTE: 2.72 E9/L (ref 1.5–4)
LYMPHOCYTES RELATIVE PERCENT: 30.9 % (ref 20–42)
MCH RBC QN AUTO: 29.9 PG (ref 26–35)
MCHC RBC AUTO-ENTMCNC: 33.1 % (ref 32–34.5)
MCV RBC AUTO: 90.4 FL (ref 80–99.9)
METER GLUCOSE: 81 MG/DL (ref 74–99)
METER GLUCOSE: 89 MG/DL (ref 74–99)
METER GLUCOSE: 96 MG/DL (ref 74–99)
METER GLUCOSE: 97 MG/DL (ref 74–99)
MONOCYTES ABSOLUTE: 0.81 E9/L (ref 0.1–0.95)
MONOCYTES RELATIVE PERCENT: 9.2 % (ref 2–12)
NEUTROPHILS ABSOLUTE: 4.46 E9/L (ref 1.8–7.3)
NEUTROPHILS RELATIVE PERCENT: 50.7 % (ref 43–80)
ORGANISM: ABNORMAL
ORGANISM: ABNORMAL
PDW BLD-RTO: 14.6 FL (ref 11.5–15)
PLATELET # BLD: 678 E9/L (ref 130–450)
PMV BLD AUTO: 9.5 FL (ref 7–12)
POTASSIUM SERPL-SCNC: 3.2 MMOL/L (ref 3.5–5)
PROTHROMBIN TIME: 20.5 SEC (ref 9.3–12.4)
RBC # BLD: 3.84 E12/L (ref 3.8–5.8)
SODIUM BLD-SCNC: 142 MMOL/L (ref 132–146)
WBC # BLD: 8.8 E9/L (ref 4.5–11.5)

## 2021-09-03 PROCEDURE — 6360000002 HC RX W HCPCS: Performed by: SPECIALIST

## 2021-09-03 PROCEDURE — 2580000003 HC RX 258: Performed by: SPECIALIST

## 2021-09-03 PROCEDURE — 2580000003 HC RX 258: Performed by: NURSE PRACTITIONER

## 2021-09-03 PROCEDURE — 36415 COLL VENOUS BLD VENIPUNCTURE: CPT

## 2021-09-03 PROCEDURE — 6370000000 HC RX 637 (ALT 250 FOR IP)

## 2021-09-03 PROCEDURE — 82962 GLUCOSE BLOOD TEST: CPT

## 2021-09-03 PROCEDURE — 6360000002 HC RX W HCPCS: Performed by: SURGERY

## 2021-09-03 PROCEDURE — 80048 BASIC METABOLIC PNL TOTAL CA: CPT

## 2021-09-03 PROCEDURE — 6370000000 HC RX 637 (ALT 250 FOR IP): Performed by: FAMILY MEDICINE

## 2021-09-03 PROCEDURE — 6370000000 HC RX 637 (ALT 250 FOR IP): Performed by: SURGERY

## 2021-09-03 PROCEDURE — 85025 COMPLETE CBC W/AUTO DIFF WBC: CPT

## 2021-09-03 PROCEDURE — 85610 PROTHROMBIN TIME: CPT

## 2021-09-03 PROCEDURE — 1200000000 HC SEMI PRIVATE

## 2021-09-03 PROCEDURE — 73502 X-RAY EXAM HIP UNI 2-3 VIEWS: CPT

## 2021-09-03 PROCEDURE — 6370000000 HC RX 637 (ALT 250 FOR IP): Performed by: NURSE PRACTITIONER

## 2021-09-03 RX ORDER — WARFARIN SODIUM 7.5 MG/1
7.5 TABLET ORAL
Status: COMPLETED | OUTPATIENT
Start: 2021-09-03 | End: 2021-09-03

## 2021-09-03 RX ORDER — LIDOCAINE HYDROCHLORIDE 10 MG/ML
5 INJECTION, SOLUTION EPIDURAL; INFILTRATION; INTRACAUDAL; PERINEURAL ONCE
Status: DISCONTINUED | OUTPATIENT
Start: 2021-09-03 | End: 2021-09-05 | Stop reason: HOSPADM

## 2021-09-03 RX ORDER — SODIUM CHLORIDE 0.9 % (FLUSH) 0.9 %
5-40 SYRINGE (ML) INJECTION EVERY 12 HOURS SCHEDULED
Status: DISCONTINUED | OUTPATIENT
Start: 2021-09-03 | End: 2021-09-05 | Stop reason: HOSPADM

## 2021-09-03 RX ORDER — HEPARIN SODIUM (PORCINE) LOCK FLUSH IV SOLN 100 UNIT/ML 100 UNIT/ML
3 SOLUTION INTRAVENOUS PRN
Status: DISCONTINUED | OUTPATIENT
Start: 2021-09-03 | End: 2021-09-05 | Stop reason: HOSPADM

## 2021-09-03 RX ORDER — HEPARIN SODIUM (PORCINE) LOCK FLUSH IV SOLN 100 UNIT/ML 100 UNIT/ML
3 SOLUTION INTRAVENOUS EVERY 12 HOURS SCHEDULED
Status: DISCONTINUED | OUTPATIENT
Start: 2021-09-03 | End: 2021-09-05 | Stop reason: HOSPADM

## 2021-09-03 RX ORDER — SODIUM CHLORIDE 9 MG/ML
25 INJECTION, SOLUTION INTRAVENOUS PRN
Status: DISCONTINUED | OUTPATIENT
Start: 2021-09-03 | End: 2021-09-05 | Stop reason: HOSPADM

## 2021-09-03 RX ORDER — SODIUM CHLORIDE 0.9 % (FLUSH) 0.9 %
5-40 SYRINGE (ML) INJECTION PRN
Status: DISCONTINUED | OUTPATIENT
Start: 2021-09-03 | End: 2021-09-05 | Stop reason: HOSPADM

## 2021-09-03 RX ADMIN — Medication: at 07:33

## 2021-09-03 RX ADMIN — CYCLOBENZAPRINE 10 MG: 10 TABLET, FILM COATED ORAL at 07:34

## 2021-09-03 RX ADMIN — LISINOPRIL 5 MG: 10 TABLET ORAL at 07:34

## 2021-09-03 RX ADMIN — METOPROLOL SUCCINATE 25 MG: 25 TABLET, FILM COATED, EXTENDED RELEASE ORAL at 07:33

## 2021-09-03 RX ADMIN — Medication 10 ML: at 07:33

## 2021-09-03 RX ADMIN — CYCLOBENZAPRINE 10 MG: 10 TABLET, FILM COATED ORAL at 13:49

## 2021-09-03 RX ADMIN — PANTOPRAZOLE SODIUM 40 MG: 40 TABLET, DELAYED RELEASE ORAL at 07:33

## 2021-09-03 RX ADMIN — PREGABALIN 75 MG: 75 CAPSULE ORAL at 07:33

## 2021-09-03 RX ADMIN — FERROUS SULFATE TAB 325 MG (65 MG ELEMENTAL FE) 325 MG: 325 (65 FE) TAB at 21:17

## 2021-09-03 RX ADMIN — ENOXAPARIN SODIUM 135 MG: 150 INJECTION SUBCUTANEOUS at 07:33

## 2021-09-03 RX ADMIN — TERBINAFINE 250 MG: 250 TABLET ORAL at 07:33

## 2021-09-03 RX ADMIN — Medication 10 ML: at 21:17

## 2021-09-03 RX ADMIN — OXYCODONE AND ACETAMINOPHEN 1 TABLET: 5; 325 TABLET ORAL at 07:33

## 2021-09-03 RX ADMIN — OXYCODONE AND ACETAMINOPHEN 1 TABLET: 5; 325 TABLET ORAL at 03:16

## 2021-09-03 RX ADMIN — HYDROXYUREA 500 MG: 500 CAPSULE ORAL at 21:17

## 2021-09-03 RX ADMIN — INSULIN GLARGINE 25 UNITS: 100 INJECTION, SOLUTION SUBCUTANEOUS at 21:15

## 2021-09-03 RX ADMIN — OXYCODONE AND ACETAMINOPHEN 1 TABLET: 5; 325 TABLET ORAL at 23:52

## 2021-09-03 RX ADMIN — OXYCODONE AND ACETAMINOPHEN 1 TABLET: 5; 325 TABLET ORAL at 11:30

## 2021-09-03 RX ADMIN — OXYCODONE AND ACETAMINOPHEN 1 TABLET: 5; 325 TABLET ORAL at 19:54

## 2021-09-03 RX ADMIN — Medication: at 21:21

## 2021-09-03 RX ADMIN — PREGABALIN 75 MG: 75 CAPSULE ORAL at 21:17

## 2021-09-03 RX ADMIN — DAPTOMYCIN 600 MG: 500 INJECTION, POWDER, LYOPHILIZED, FOR SOLUTION INTRAVENOUS at 19:55

## 2021-09-03 RX ADMIN — POTASSIUM CHLORIDE 40 MEQ: 1500 TABLET, EXTENDED RELEASE ORAL at 13:49

## 2021-09-03 RX ADMIN — FENOFIBRATE 54 MG: 54 TABLET ORAL at 07:34

## 2021-09-03 RX ADMIN — OXYCODONE AND ACETAMINOPHEN 1 TABLET: 5; 325 TABLET ORAL at 15:46

## 2021-09-03 RX ADMIN — PRAVASTATIN SODIUM 20 MG: 20 TABLET ORAL at 21:17

## 2021-09-03 RX ADMIN — HYDROXYUREA 500 MG: 500 CAPSULE ORAL at 07:33

## 2021-09-03 RX ADMIN — WARFARIN SODIUM 7.5 MG: 7.5 TABLET ORAL at 19:54

## 2021-09-03 RX ADMIN — DULOXETINE HYDROCHLORIDE 30 MG: 30 CAPSULE, DELAYED RELEASE ORAL at 07:34

## 2021-09-03 RX ADMIN — ENOXAPARIN SODIUM 135 MG: 150 INJECTION SUBCUTANEOUS at 21:17

## 2021-09-03 RX ADMIN — FERROUS SULFATE TAB 325 MG (65 MG ELEMENTAL FE) 325 MG: 325 (65 FE) TAB at 07:33

## 2021-09-03 RX ADMIN — CYCLOBENZAPRINE 10 MG: 10 TABLET, FILM COATED ORAL at 19:54

## 2021-09-03 ASSESSMENT — PAIN DESCRIPTION - PROGRESSION
CLINICAL_PROGRESSION: NOT CHANGED

## 2021-09-03 ASSESSMENT — PAIN DESCRIPTION - DESCRIPTORS
DESCRIPTORS: ACHING;DISCOMFORT;CONSTANT
DESCRIPTORS: ACHING;DULL;DISCOMFORT
DESCRIPTORS: ACHING;DISCOMFORT;DULL
DESCRIPTORS: ACHING;DISCOMFORT;DULL
DESCRIPTORS: ACHING;CONSTANT;DISCOMFORT

## 2021-09-03 ASSESSMENT — PAIN DESCRIPTION - ONSET
ONSET: ON-GOING

## 2021-09-03 ASSESSMENT — PAIN DESCRIPTION - ORIENTATION
ORIENTATION: LEFT

## 2021-09-03 ASSESSMENT — PAIN DESCRIPTION - FREQUENCY
FREQUENCY: CONTINUOUS

## 2021-09-03 ASSESSMENT — PAIN DESCRIPTION - LOCATION
LOCATION: ANKLE;FOOT;LEG

## 2021-09-03 ASSESSMENT — PAIN DESCRIPTION - PAIN TYPE
TYPE: ACUTE PAIN

## 2021-09-03 ASSESSMENT — PAIN - FUNCTIONAL ASSESSMENT
PAIN_FUNCTIONAL_ASSESSMENT: PREVENTS OR INTERFERES WITH MANY ACTIVE NOT PASSIVE ACTIVITIES
PAIN_FUNCTIONAL_ASSESSMENT: PREVENTS OR INTERFERES SOME ACTIVE ACTIVITIES AND ADLS

## 2021-09-03 ASSESSMENT — PAIN SCALES - GENERAL
PAINLEVEL_OUTOF10: 7
PAINLEVEL_OUTOF10: 0
PAINLEVEL_OUTOF10: 7
PAINLEVEL_OUTOF10: 5
PAINLEVEL_OUTOF10: 5
PAINLEVEL_OUTOF10: 8
PAINLEVEL_OUTOF10: 8
PAINLEVEL_OUTOF10: 4
PAINLEVEL_OUTOF10: 6
PAINLEVEL_OUTOF10: 8

## 2021-09-03 ASSESSMENT — PAIN SCALES - WONG BAKER: WONGBAKER_NUMERICALRESPONSE: 0

## 2021-09-03 NOTE — PROGRESS NOTES
Pharmacy Consultation Note  (Warfarin Dosing and Monitoring)     Initial consult date: 08/31/2021  Consulting physician: IGOR Powell-LATOYA     Allergies:  Seasonal and Tramadol     50 y. o. male           Ht Readings from Last 1 Encounters:   08/31/21 6' 2\" (1.88 m)            Wt Readings from Last 1 Encounters:   08/31/21 290 lb (131.5 kg)            Warfarin Indication Target   INR Range Home Dose  (if applicable) Diet/Feeding Tube   (Enteral feeds, nutritional drinks, increased Vitamin K in diet can decrease INR)   DVT/PE 2-3 7.5 mg daily Regular         x Home Med? Meds Increasing INR x Home Med?  Meds Decreasing INR       Allopurinol      Azathioprine       Amiodarone/Propafenone/Dronedarone     Carbamazepine       Androgens     Cholestyramine       Chemotherapy (BBW: Capecitabine)     Estrogen       Ciprofloxacin/Levofloxacin     Nafcillin/Dicloxacillin       Clarithromycin/Erythromycin/Azithromycin     Barbiturates        Fluconazole/Itraconazole/Voriconazole/Ketoconazole     Phenytoin (Variable)       Metronidazole     Rifampin       Phenytoin (Variable)     Steroids (Variable/Dose Dependent)   X Y  Statins/Fenofibrate/Gemfibrozil     Sucralfate       Steroids (Variable/Dose Dependent)     Other:       Sulfamethoxazole/Trimethoprim             Tramadol              Other:            Comments regarding medication interactions:        x Diseases Affecting INR x Increased Bleeding Risk     CHF Exacerbation (Increases)   History GI Bleed/PUD     Liver Disease (Increases)   Chronic NSAID Use     Thyroid: Hyper (Increases)  Hypo (Decreases)   Chronic ASA/Antiplatelet Use (Clopidogrel/ Dipyridamole/Prasugrel/Ticagrelor)       Malignancy (Increases)   Abnormal Renal Function (dialysis, renal transplant, SCr ? 2.3 mg/dL)      History of EtOH Abuse: Acute (Increases)   Chronic (Decreases)   Liver Function (cirrhosis, bilirubin >2x ULN with AST/ALT/AP >3x ULN)     Fever (Increases)   Age > 65 years     Acute

## 2021-09-03 NOTE — PROGRESS NOTES
[address] [place] [address] [facility]  Progress Note      Chief Complaint   Patient presents with    Leg Pain     redness, swelling, pain to LLE, +thinners, hx DVT       SUBJECTIVE:  Methicilin resistant staph species bacteremia  Patient is tolerating medications. No reported adverse drug reactions. INR 1.4 today. Improving cellulitis, swelling and pain LLE. Has bilateral hip replacements and has neck hardware. No pain or tenderness and hardware sites. No nausea, vomiting. Reports chronic intermittent diarrhea for last 2 years follow colon resection. His pain when he ambulates with the left leg and hip    Microbiology:   3/21/2016:   - Nares MRSA  10/11/2016:   - Nares MRSA  6/30/2016:   - Foot MRSA, Pantoea spp  - Blood no growth  7/31/2020  - Blood no growth  8/1/2020  - Coccyx ulcer Corynebacterium, Psuedomonas aeruginosa   8/3/2020  - Gall Bladder fluid no growth  9/17/2020  - Blood no growth  8/31/2021  - Blood culture methicillin-resistant Staphylococcus species-Staphylococcus kelly  9/2/2021  -Blood cultures NGTD    Review of systems:  As stated above in the chief complaint, otherwise negative.     Medications:  Scheduled Meds:   daptomycin (CUBICIN) IVPB  6 mg/kg (Adjusted) IntraVENous Q24H    terbinafine  250 mg Oral Daily    miconazole nitrate   Topical BID    enoxaparin  1 mg/kg SubCUTAneous BID    cyclobenzaprine  10 mg Oral TID    DULoxetine  30 mg Oral Daily    fenofibrate  54 mg Oral Daily    ferrous sulfate  325 mg Oral BID    hydroxyurea  500 mg Oral BID    insulin glargine  25 Units SubCUTAneous Nightly    lisinopril  5 mg Oral Daily    metoprolol succinate  25 mg Oral Daily    pantoprazole  40 mg Oral Daily    pravastatin  20 mg Oral Nightly    sodium chloride flush  5-40 mL IntraVENous 2 times per day    pregabalin  75 mg Oral BID    warfarin (COUMADIN) daily dosing (placeholder)   Other RX Placeholder    insulin lispro  0-6 Units SubCUTAneous TID WC    insulin lispro  0-3 Units SubCUTAneous Nightly     Continuous Infusions:   sodium chloride      dextrose       PRN Meds:oxyCODONE-acetaminophen, melatonin, loperamide, sodium chloride flush, sodium chloride, ondansetron **OR** ondansetron, polyethylene glycol, acetaminophen **OR** acetaminophen, potassium chloride **OR** potassium alternative oral replacement **OR** potassium chloride, magnesium sulfate, glucose, dextrose, glucagon (rDNA), dextrose    OBJECTIVE:  /62   Pulse 75   Temp 98.3 °F (36.8 °C) (Temporal)   Resp 17   Ht 6' 2\" (1.88 m)   Wt 290 lb (131.5 kg)   SpO2 97%   BMI 37.23 kg/m²   Temp  Av.5 °F (36.4 °C)  Min: 97 °F (36.1 °C)  Max: 98.3 °F (36.8 °C)  Constitutional: The patient is awake, alert, and oriented. Skin:  Healed cicatrix bilateral dorsal foot and medial ankle from surgery 2019. Proximal streaking left upper leg, improved erythema, warm left lower leg with raised, soft, erythematous nodules. No jaundice. HEENT: Round and reactive pupils. Moist mucous membranes. No ulcerations or thrush. Neck: Supple to movements. Chest: No use of accessory muscles to breathe. Symmetrical expansion. No wheezing, crackles or rhonchi. Cardiovascular: S1 and S2 are rhythmic and regular. No murmurs appreciated. Abdomen: Positive bowel sounds to auscultation. Benign to palpation. No masses felt. No hepatosplenomegaly.   Genitourinary: No hematuria, no dysuria, no urgency/frequency  Extremities: Erythema, proximal streaking left upper leg resolving, +2 pitting edema LLE  Lines: peripheral    Laboratory and Tests Review:  Lab Results   Component Value Date    WBC 8.8 2021    WBC 9.6 2021    WBC 10.0 2021    HGB 11.5 (L) 2021    HCT 34.7 (L) 2021    MCV 90.4 2021     (H) 2021     Lab Results   Component Value Date    NEUTROABS 4.46 2021    NEUTROABS 5.34 2021    NEUTROABS 8.72 (H) 2021     No results found for: [facility]  Lab Results   Component Value Date    ALT 32 09/01/2021    AST 19 09/01/2021    ALKPHOS 141 (H) 09/01/2021    BILITOT <0.2 09/01/2021     Lab Results   Component Value Date     09/03/2021    K 3.2 09/03/2021    K 3.0 09/01/2021     09/03/2021    CO2 23 09/03/2021    BUN 8 09/03/2021    CREATININE 0.8 09/03/2021    CREATININE 0.7 09/01/2021    CREATININE 0.8 08/30/2021    GFRAA >60 09/03/2021    LABGLOM >60 09/03/2021    GLUCOSE 119 09/03/2021    GLUCOSE 125 05/11/2012    PROT 6.4 09/01/2021    LABALBU 3.5 09/01/2021    LABALBU 4.8 05/11/2012    CALCIUM 8.7 09/03/2021    BILITOT <0.2 09/01/2021    ALKPHOS 141 09/01/2021    AST 19 09/01/2021    ALT 32 09/01/2021     Lab Results   Component Value Date    CRP 4.0 (H) 08/31/2021    CRP 16.9 (H) 10/10/2020    CRP 13.4 (H) 09/24/2020     Lab Results   Component Value Date    SEDRATE 50 (H) 08/31/2021    SEDRATE 95 (H) 09/24/2020    SEDRATE 28 (H) 06/30/2019     Radiology:  rerviewed    Microbiology:   Lab Results   Component Value Date    BC 5 Days no growth 09/17/2020    BC 5 Days no growth 07/31/2020    BC 5 Days- no growth 06/30/2019    ORG Staphylococcus warneri 08/31/2021    ORG Staphylococcus warneri 08/31/2021    ORG Corynebacterium species 08/01/2020    ORG Pseudomonas aeruginosa 08/01/2020     Lab Results   Component Value Date    BLOODCULT2 24 Hours no growth 09/02/2021    BLOODCULT2 Previously positive blood culture called 08/31/2021    BLOODCULT2 5 Days no growth 07/31/2020    ORG Staphylococcus warneri 08/31/2021    ORG Staphylococcus warneri 08/31/2021    ORG Corynebacterium species 08/01/2020    ORG Pseudomonas aeruginosa 08/01/2020     WOUND/ABSCESS   Date Value Ref Range Status   08/01/2020 Heavy growth  Final   08/01/2020 Rare growth  Final   06/30/2019 (A)  Corrected    Mixed jf isolated. Further workup and sensitivity testing  is not routinely indicated and will not be performed.   Mixed jf isolated includes:  Gram negative rods  Alpha cultures, and radiographs reviewed. Face to Face encounter occurred. Changes made as necessary.      Shalonda Trejo MD

## 2021-09-03 NOTE — CONSULTS
Department of Orthopedic Trauma Surgery  Resident consult note      CHIEF COMPLAINT:   Chief Complaint   Patient presents with    Leg Pain     redness, swelling, pain to LLE, +thinners, hx DVT       HISTORY OF PRESENT ILLNESS:                The patient is a 50 y.o. male who presents with left hip pain. Patient with history of bilateral total hip arthroplasties performed by Dr. Ameya Johnson. Left hip was performed 4/11/2016. Patient with no complications in the postoperative. . Patient currently admitted for leg pain, history of multiple DVTs. Patient initially evaluated by vascular, per note patient with underlying dermatophytosis, lymphedema, venous hypertension, blister formation, cellulitis and lymphangitis of the medial aspect of the left thigh the recommendation was lifelong anticoagulant therapy and also ID consultation given cellulitis to the left lower extremity, they also stated the patient may benefit from lymphedema therapy once the cellulitis resolves. After evaluation by ID patient was started on antibiotics as well as miconazole and Lamisil, cultures were drawn and ESR and CRP were checked. We were then consulted since patient was complaining of some groin pain and had a history of left total hip arthroplasty. On current examination patient states that pain to the left leg and groin is decreased from previously. He also states that the redness and swelling has gone down significantly although it still present. He states he is able to get up and move around on the leg without significant discomfort into the hip/groin area. Patient is denying any numbness, tingling, paresthesias. No other orthopedic complaints at this time.        Past Medical History:        Diagnosis Date    Accident 11/2019    stepped on nail rt ft about 1 month ago- healed per patient    Acute thrombosis of inferior vena cava ([address] 10/10/2020    SIMÓN (acute kidney injury) ([address] 2008 apx    kidney bruised due to fall / Kristeen Eaves    Allergic rhinitis     Blister of left leg 8/31/2021    Cellulitis of leg, left 8/31/2021    Chronic back pain     Depression     Dermatophytosis 8/31/2021    Diabetes mellitus ([facility]     Difficulty sleeping     at times    Displacement of lumbar intervertebral disc without myelopathy     Fibromyalgia     Fractured rib     2008 / healed    H/O seasonal allergies     Head injury 1980'S apx    no residual s/s    History of deep vein thrombosis 8/31/2021    Hyperlipidemia     Hypertension     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    Lymphedema of left leg 8/31/2021    Obesity (BMI 35.0-39.9 without comorbidity)     bmi 39.2  weight 296 #    Osteoarthritis     Recurrent acute deep vein thrombosis (DVT) of left lower extremity ([facility] 8/31/2021    S/P IVC filter 8/31/2021    Subtherapeutic international normalized ratio (INR) 8/31/2021    Thoracic or lumbosacral neuritis or radiculitis, unspecified      Past Surgical History:        Procedure Laterality Date    COLONOSCOPY N/A 9/5/2020    COLONOSCOPY WITH BIOPSY performed by Ana Banks MD at [address] [facility] CT PTC NEW ACCESS  8/3/2020    CT PTC NEW ACCESS 8/3/2020 [facility] CT    FOOT SURGERY Right 1985    to treat shattered bones    HERNIA REPAIR  2001    DOUBLE HERNIA    HERNIA REPAIR Right 12/9/2019    LAPAROSCOPIC RIGHT INGUINAL HERNIA REPAIR, MESH 10x15 cm PLACEMENT performed by Efraín Moreno MD at [address] [place] [address] Left 4/11/2016    ILIAC ARTERY STENT INSERTION N/A 10/11/2020    S/P ILIAC STENT PLACEMENT VISUALIZATION performed by Asad Hayes MD at [place] [address] N/A 9/18/2020    LAPAROTOMY EXPLORATORY, CHOLECYSTECTOMY, BOWEL RESECTION, right darian colectomy, partial omentectomy, and splenectomy performed by Ana Banks MD at [address] COLONOSCOPY FLX DX W/MARIA ALEJANDRA Queen 1978 PFRMD N/A 5/7/2018    COLONOSCOPY DIAGNOSTIC performed by Won Dixon MD at [facility] ENDOSCOPY    ROTATOR CUFF REPAIR Left 2014    Dr. Munoz Him ARTHROSCOPY Left 11 21 14    SHOULDER SURGERY  2001 &2007    RIGHT AND LEFT repair of tears    THROMBECTOMY / EMBOLECTOMY FEMORAL Left 1/1/2021    LEFT LOWER EXTREMITY, VENOGRAM, FOLLOW UP POSSIBLE REMOVAL LYSIS CATHETER performed by Emanuel Recinos MD at [address] / EMBOLECTOMY FEMORAL Left 1/2/2021    LEFT LOWER EXTREMITY VENOGRAM performed by Emanuel Recinos MD at [address] [facility] Right 10/31/2016    Total right hip  Adelaida Sky MD    UPPER GASTROINTESTINAL ENDOSCOPY N/A 9/4/2020    EGD DIAGNOSTIC ONLY performed by Deya Isaac MD at [address] [facility] Left 1/3/2021    LEFT LOWER EXTREMITY VENOGRAM, PLACEMENT OF NEW LYSIS CATHETER performed by Emanuel Recinos MD at [place] [address]  2007    LEFT WRIST     Current Medications:   Current Facility-Administered Medications: warfarin (COUMADIN) tablet 7.5 mg, 7.5 mg, Oral, Once  lidocaine PF 1 % injection 5 mL, 5 mL, IntraDERmal, Once  sodium chloride flush 0.9 % injection 5-40 mL, 5-40 mL, IntraVENous, 2 times per day  sodium chloride flush 0.9 % injection 5-40 mL, 5-40 mL, IntraVENous, PRN  0.9 % sodium chloride infusion, 25 mL, IntraVENous, PRN  heparin flush 100 UNIT/ML injection 300 Units, 3 mL, IntraVENous, 2 times per day  heparin flush 100 UNIT/ML injection 300 Units, 3 mL, IntraCATHeter, PRN  oxyCODONE-acetaminophen (PERCOCET) 5-325 MG per tablet 1 tablet, 1 tablet, Oral, Q4H PRN  DAPTOmycin (CUBICIN) 600 mg in sodium chloride 0.9 % 50 mL IVPB, 6 mg/kg (Adjusted), IntraVENous, Q24H  terbinafine (LAMISIL) tablet 250 mg, 250 mg, Oral, Daily  miconazole nitrate 2 % ointment, , Topical, BID  enoxaparin (LOVENOX) injection 135 mg, 1 mg/kg, SubCUTAneous, BID  cyclobenzaprine (FLEXERIL) tablet 10 mg, 10 mg, Oral, TID  DULoxetine (CYMBALTA) extended release capsule 30 mg, 30 mg, Oral, Daily  fenofibrate (TRICOR) tablet 54 mg, 54 mg, Oral, Daily  ferrous sulfate (IRON 325) tablet 325 mg, 325 mg, Oral, BID  hydroxyurea (HYDREA) chemo capsule 500 mg, 500 mg, Oral, BID  insulin glargine (LANTUS) injection vial 25 Units, 25 Units, SubCUTAneous, Nightly  lisinopril (PRINIVIL;ZESTRIL) tablet 5 mg, 5 mg, Oral, Daily  melatonin tablet 3 mg, 3 mg, Oral, Nightly PRN  loperamide (IMODIUM) capsule 2 mg, 2 mg, Oral, 4x Daily PRN  metoprolol succinate (TOPROL XL) extended release tablet 25 mg, 25 mg, Oral, Daily  pantoprazole (PROTONIX) tablet 40 mg, 40 mg, Oral, Daily  pravastatin (PRAVACHOL) tablet 20 mg, 20 mg, Oral, Nightly  ondansetron (ZOFRAN-ODT) disintegrating tablet 4 mg, 4 mg, Oral, Q8H PRN **OR** ondansetron (ZOFRAN) injection 4 mg, 4 mg, IntraVENous, Q6H PRN  polyethylene glycol (GLYCOLAX) packet 17 g, 17 g, Oral, Daily PRN  acetaminophen (TYLENOL) tablet 650 mg, 650 mg, Oral, Q6H PRN **OR** acetaminophen (TYLENOL) suppository 650 mg, 650 mg, Rectal, Q6H PRN  potassium chloride (KLOR-CON M) extended release tablet 40 mEq, 40 mEq, Oral, PRN **OR** potassium bicarb-citric acid (EFFER-K) effervescent tablet 40 mEq, 40 mEq, Oral, PRN **OR** potassium chloride 10 mEq/100 mL IVPB (Peripheral Line), 10 mEq, IntraVENous, PRN  magnesium sulfate 2000 mg in 50 mL IVPB premix, 2,000 mg, IntraVENous, PRN  pregabalin (LYRICA) capsule 75 mg, 75 mg, Oral, BID  warfarin (COUMADIN) daily dosing (placeholder), , Other, RX Placeholder  insulin lispro (HUMALOG) injection vial 0-6 Units, 0-6 Units, SubCUTAneous, TID WC  insulin lispro (HUMALOG) injection vial 0-3 Units, 0-3 Units, SubCUTAneous, Nightly  glucose (GLUTOSE) 40 % oral gel 15 g, 15 g, Oral, PRN  dextrose 50 % IV solution, 12.5 g, IntraVENous, PRN  glucagon (rDNA) injection 1 mg, 1 mg, IntraMUSCular, PRN  dextrose 5 % solution, 100 mL/hr, IntraVENous, PRN  Allergies:  Seasonal and Tramadol    Social History:   TOBACCO:   reports that he has never smoked.  His smokeless tobacco use includes chew. ETOH:   reports previous alcohol use. DRUGS:   reports no history of drug use. ACTIVITIES OF DAILY LIVING:    OCCUPATION:    Family History:       Problem Relation Age of Onset    Hypertension Mother     Arthritis Father     Diabetes Father     Cancer Maternal Grandfather         Skin    Cancer Paternal Uncle         skin    Diabetes Paternal Grandfather     Heart Disease Maternal Grandmother     Stroke Maternal Aunt        REVIEW OF SYSTEMS:   Skin: no abnormal pigmentation, rash  Eyes: no blurring or eye pain   Ears/Nose/Throat: no hearing loss, tinnitus  Respiratory: No increased work of breathing, no coughing  Cardiovascular: Brisk capillary refill bilaterally, well perfused extremities  Gastrointestinal: no nausea, vomiting  Neurologic: no paralysis, or seizures  Musculoskeletal: Swelling, redness, pain      PHYSICAL EXAM:    VITALS:  /69   Pulse 77   Temp 97.4 °F (36.3 °C) (Temporal)   Resp 18   Ht 6' 2\" (1.88 m)   Wt 290 lb (131.5 kg)   SpO2 99%   BMI 37.23 kg/m²   Constitutional: Oriented to person, place, and time; Answer questions appropriately  HENT: Head: Normocephalic and atraumatic. Eyes: EOMI, ANTHONY  Neck: Supple, trachea midline  Cardiovascular: Brisk capillary refill to all extremities, extremities well perfused  Pulmonary/Chest: No increased work of breathing, no cough  Abdominal: Non-tender, non-distended  Neurologic:  Awake, alert and oriented in three planes.  No gross deficits   Musculoskeletal:  Left lower extremity  · Blistering to the lower extremity of variable sizes and stages of healing, none greater than 1 cm, moderate swelling to the entire left lower extremity and compartments are firm but compressible and there is no pain with palpation of the compartments in the upper leg or lower leg  · Very mild erythema in the medial side of the leg, when reviewing previous notes it appears that this is significantly decreased  · Very mild tenderness palpation along the medial thigh, again when reviewing previous notes it appears that this is significantly decreased  · Able to range the hip up to 90 degrees with 20 degrees of internal and external rotation and no pain  · Compartments soft and compressible  · +5/5 GS/TA/EHL  · +2/4 DP & PT pulses, Cap refill <3 sec, Toes warm and perfused  · Distal sensation grossly intact to Peroneals, Tibial, Sural, Saphenous nrs          DATA:    CBC:   Lab Results   Component Value Date    WBC 8.8 09/03/2021    RBC 3.84 09/03/2021    HGB 11.5 09/03/2021    HCT 34.7 09/03/2021    MCV 90.4 09/03/2021    MCH 29.9 09/03/2021    MCHC 33.1 09/03/2021    RDW 14.6 09/03/2021     09/03/2021    MPV 9.5 09/03/2021     Radiology Review: X-ray left hip status post total hip arthroplasty demonstrates no acute fracture dislocation, when compared to previous x-rays no evidence of hardware failure or lucency, no subsidence of the stem       IMPRESSION:  DVT left lower extremity, recurrent  Cellulitis with lymphangitis and lymphedema  Status post total hip arthroplasty 2016    PLAN:  Weight-bear as tolerated left lower extremity  Based on physical examination low suspicion for septic arthritis, no acute orthopedic intervention needed, if there is any changes in the examination would consider CT scan versus aspiration of the left hip but at this time feel it is not necessary  Continue antibiotics per infectious disease  Continue recommendations per vascular  Discussed with Dr. Stevie Kahn

## 2021-09-03 NOTE — PROGRESS NOTES
Hospitalist Progress Note      SYNOPSIS: Patient admitted on 2021 for cellulitis of the LLE. He  Presented with a complaint of LLE redness, pain and swelling for 3 days prior to admission. He has a history of DVT and PE for which he is chronically anticoagulated on coumadin. He had not been compliant with his INR checks. On admission he was found to have left iliac and common femoral vein occlusive thrombosis. He has been managed for cellulitis of the left lower extremity as well as occlusive DVT of the left lower extremity. SUBJECTIVE:    Patient seen and examined. He has no complaints and feels his LLE is much better. Review of systems is otherwise negative. Records reviewed. Temp (24hrs), Av.5 °F (36.4 °C), Min:97 °F (36.1 °C), Max:98.3 °F (36.8 °C)    DIET: ADULT DIET; Regular; 3 carb choices (45 gm/meal)  CODE: Full Code  No intake or output data in the 24 hours ending 21 1111    OBJECTIVE:    /62   Pulse 75   Temp 98.3 °F (36.8 °C) (Temporal)   Resp 17   Ht 6' 2\" (1.88 m)   Wt 290 lb (131.5 kg)   SpO2 97%   BMI 37.23 kg/m²     General appearance: No apparent distress, appears stated age and cooperative. HEENT:  Conjunctivae/corneas clear. Neck: Supple. No jugular venous distention. Respiratory: Clear to auscultation bilaterally, normal respiratory effort  Cardiovascular: Regular rate rhythm, normal S1-S2  Abdomen: Soft, nontender, nondistended  Musculoskeletal: No clubbing, cyanosis, no bilateral lower extremity edema. Brisk capillary refill. Skin:  Swellling, redness and rash on LLE has improved markedly  Neurologic: awake, alert and following commands     ASSESSMENT:  #Cellulitis of the LLE  -on daptomycin, ancef and econazole  -ID on board  -blood cultures growing Staph warneri; it was previously characterised as MRSA . -2D echo pending. -patient also known to have cervical hardware as well as bilateral hip replacement     #DVT of the LLE  -on coumadin. INR today is 1.9. Being bridged with lovenox. Consider Discontinue lovenox tomorrow once INR is between 2-3      #Type 2 diabetes mellitus complicated by peripheral neuropathy  -on lantus 25 units daily. -ISS. Accuchecks ACHS  -on pregabalin     #Hyperlipidemia: on fenofibrate     #Hypertension: on lisinopril and metoprolol     #Hypokalemia:resolved.     DVT prophylaxis: not indicated as he is on treatment for DVT. DISPOSITION:  - for likely DC tomorrow. - Await ID recommendations about antibiotics and duration of antibiotics on discharge.       Medications:  REVIEWED DAILY    Infusion Medications    sodium chloride      dextrose       Scheduled Medications    daptomycin (CUBICIN) IVPB  6 mg/kg (Adjusted) IntraVENous Q24H    terbinafine  250 mg Oral Daily    miconazole nitrate   Topical BID    enoxaparin  1 mg/kg SubCUTAneous BID    cyclobenzaprine  10 mg Oral TID    DULoxetine  30 mg Oral Daily    fenofibrate  54 mg Oral Daily    ferrous sulfate  325 mg Oral BID    hydroxyurea  500 mg Oral BID    insulin glargine  25 Units SubCUTAneous Nightly    lisinopril  5 mg Oral Daily    metoprolol succinate  25 mg Oral Daily    pantoprazole  40 mg Oral Daily    pravastatin  20 mg Oral Nightly    sodium chloride flush  5-40 mL IntraVENous 2 times per day    pregabalin  75 mg Oral BID    warfarin (COUMADIN) daily dosing (placeholder)   Other RX Placeholder    insulin lispro  0-6 Units SubCUTAneous TID WC    insulin lispro  0-3 Units SubCUTAneous Nightly     PRN Meds: oxyCODONE-acetaminophen, melatonin, loperamide, sodium chloride flush, sodium chloride, ondansetron **OR** ondansetron, polyethylene glycol, acetaminophen **OR** acetaminophen, potassium chloride **OR** potassium alternative oral replacement **OR** potassium chloride, magnesium sulfate, glucose, dextrose, glucagon (rDNA), dextrose    Labs:     Recent Labs     09/01/21  0447 09/02/21  1015 09/03/21  0909   WBC 10.0 9.6 8.8   HGB 11.2* 11.7* 11.5*   HCT 34.0* 36.1* 34.7*   * 696* 678*       Recent Labs     09/01/21  0447      K 3.0*   *   CO2 24   BUN 8   CREATININE 0.7   CALCIUM 8.7       Recent Labs     09/01/21  0447   PROT 6.4   ALKPHOS 141*   ALT 32   AST 19   BILITOT <0.2       Recent Labs     09/01/21  0447 09/02/21  0705 09/03/21  0909   INR 1.2 1.4 1.9       No results for input(s): Marie [place] in the last 72 hours. Chronic labs:    Lab Results   Component Value Date    CHOL 154 03/29/2021    TRIG 202 (H) 03/29/2021    HDL 38 03/29/2021    LDLCALC 76 03/29/2021    TSH 1.650 03/29/2021    INR 1.9 09/03/2021    LABA1C 6.5 03/29/2021       Radiology: REVIEWED DAILY    +++++++++++++++++++++++++++++++++++++++++++++++++  Jeanette Martinez MD  [facility] - [address], [place]  +++++++++++++++++++++++++++++++++++++++++++++++++  NOTE: This report was transcribed using voice recognition software. Every effort was made to ensure accuracy; however, inadvertent computerized transcription errors may be present.

## 2021-09-04 ENCOUNTER — APPOINTMENT (OUTPATIENT)
Dept: NUCLEAR MEDICINE | Age: 46
DRG: 383 | End: 2021-09-04
Payer: MEDICARE

## 2021-09-04 LAB
INR BLD: 2
METER GLUCOSE: 138 MG/DL (ref 74–99)
METER GLUCOSE: 75 MG/DL (ref 74–99)
METER GLUCOSE: 79 MG/DL (ref 74–99)
METER GLUCOSE: 83 MG/DL (ref 74–99)
PROTHROMBIN TIME: 21.9 SEC (ref 9.3–12.4)

## 2021-09-04 PROCEDURE — 36569 INSJ PICC 5 YR+ W/O IMAGING: CPT

## 2021-09-04 PROCEDURE — 2580000003 HC RX 258: Performed by: NURSE PRACTITIONER

## 2021-09-04 PROCEDURE — A9503 TC99M MEDRONATE: HCPCS | Performed by: RADIOLOGY

## 2021-09-04 PROCEDURE — 78315 BONE IMAGING 3 PHASE: CPT

## 2021-09-04 PROCEDURE — 6370000000 HC RX 637 (ALT 250 FOR IP): Performed by: FAMILY MEDICINE

## 2021-09-04 PROCEDURE — 6370000000 HC RX 637 (ALT 250 FOR IP): Performed by: SURGERY

## 2021-09-04 PROCEDURE — C1751 CATH, INF, PER/CENT/MIDLINE: HCPCS

## 2021-09-04 PROCEDURE — 36415 COLL VENOUS BLD VENIPUNCTURE: CPT

## 2021-09-04 PROCEDURE — 02HV33Z INSERTION OF INFUSION DEVICE INTO SUPERIOR VENA CAVA, PERCUTANEOUS APPROACH: ICD-10-PCS | Performed by: INTERNAL MEDICINE

## 2021-09-04 PROCEDURE — 78315 BONE IMAGING 3 PHASE: CPT | Performed by: RADIOLOGY

## 2021-09-04 PROCEDURE — 3430000000 HC RX DIAGNOSTIC RADIOPHARMACEUTICAL: Performed by: RADIOLOGY

## 2021-09-04 PROCEDURE — 6360000002 HC RX W HCPCS: Performed by: NURSE PRACTITIONER

## 2021-09-04 PROCEDURE — 76937 US GUIDE VASCULAR ACCESS: CPT

## 2021-09-04 PROCEDURE — 1200000000 HC SEMI PRIVATE

## 2021-09-04 PROCEDURE — 78306 BONE IMAGING WHOLE BODY: CPT

## 2021-09-04 PROCEDURE — 6360000002 HC RX W HCPCS: Performed by: SPECIALIST

## 2021-09-04 PROCEDURE — 6370000000 HC RX 637 (ALT 250 FOR IP)

## 2021-09-04 PROCEDURE — 6360000002 HC RX W HCPCS: Performed by: SURGERY

## 2021-09-04 PROCEDURE — 82962 GLUCOSE BLOOD TEST: CPT

## 2021-09-04 PROCEDURE — 2580000003 HC RX 258: Performed by: SPECIALIST

## 2021-09-04 PROCEDURE — 85610 PROTHROMBIN TIME: CPT

## 2021-09-04 PROCEDURE — 6370000000 HC RX 637 (ALT 250 FOR IP): Performed by: NURSE PRACTITIONER

## 2021-09-04 RX ORDER — WARFARIN SODIUM 7.5 MG/1
7.5 TABLET ORAL
Status: COMPLETED | OUTPATIENT
Start: 2021-09-04 | End: 2021-09-04

## 2021-09-04 RX ORDER — TC 99M MEDRONATE 20 MG/10ML
28 INJECTION, POWDER, LYOPHILIZED, FOR SOLUTION INTRAVENOUS
Status: COMPLETED | OUTPATIENT
Start: 2021-09-04 | End: 2021-09-04

## 2021-09-04 RX ADMIN — HYDROXYUREA 500 MG: 500 CAPSULE ORAL at 20:26

## 2021-09-04 RX ADMIN — Medication 10 ML: at 09:41

## 2021-09-04 RX ADMIN — PREGABALIN 75 MG: 75 CAPSULE ORAL at 08:21

## 2021-09-04 RX ADMIN — Medication: at 09:41

## 2021-09-04 RX ADMIN — Medication 10 ML: at 20:25

## 2021-09-04 RX ADMIN — SODIUM CHLORIDE, PRESERVATIVE FREE 300 UNITS: 5 INJECTION INTRAVENOUS at 20:24

## 2021-09-04 RX ADMIN — DAPTOMYCIN 600 MG: 500 INJECTION, POWDER, LYOPHILIZED, FOR SOLUTION INTRAVENOUS at 17:51

## 2021-09-04 RX ADMIN — FERROUS SULFATE TAB 325 MG (65 MG ELEMENTAL FE) 325 MG: 325 (65 FE) TAB at 20:25

## 2021-09-04 RX ADMIN — CYCLOBENZAPRINE 10 MG: 10 TABLET, FILM COATED ORAL at 20:17

## 2021-09-04 RX ADMIN — HYDROXYUREA 500 MG: 500 CAPSULE ORAL at 08:17

## 2021-09-04 RX ADMIN — FENOFIBRATE 54 MG: 54 TABLET ORAL at 08:16

## 2021-09-04 RX ADMIN — OXYCODONE AND ACETAMINOPHEN 1 TABLET: 5; 325 TABLET ORAL at 08:17

## 2021-09-04 RX ADMIN — OXYCODONE AND ACETAMINOPHEN 1 TABLET: 5; 325 TABLET ORAL at 12:13

## 2021-09-04 RX ADMIN — Medication: at 20:29

## 2021-09-04 RX ADMIN — PRAVASTATIN SODIUM 20 MG: 20 TABLET ORAL at 20:25

## 2021-09-04 RX ADMIN — OXYCODONE AND ACETAMINOPHEN 1 TABLET: 5; 325 TABLET ORAL at 16:15

## 2021-09-04 RX ADMIN — WARFARIN SODIUM 7.5 MG: 7.5 TABLET ORAL at 17:51

## 2021-09-04 RX ADMIN — TC 99M MEDRONATE 28 MILLICURIE: 20 INJECTION, POWDER, LYOPHILIZED, FOR SOLUTION INTRAVENOUS at 10:20

## 2021-09-04 RX ADMIN — CYCLOBENZAPRINE 10 MG: 10 TABLET, FILM COATED ORAL at 15:06

## 2021-09-04 RX ADMIN — PREGABALIN 75 MG: 75 CAPSULE ORAL at 20:25

## 2021-09-04 RX ADMIN — LISINOPRIL 5 MG: 10 TABLET ORAL at 08:16

## 2021-09-04 RX ADMIN — ENOXAPARIN SODIUM 135 MG: 150 INJECTION SUBCUTANEOUS at 20:28

## 2021-09-04 RX ADMIN — FERROUS SULFATE TAB 325 MG (65 MG ELEMENTAL FE) 325 MG: 325 (65 FE) TAB at 08:15

## 2021-09-04 RX ADMIN — INSULIN GLARGINE 25 UNITS: 100 INJECTION, SOLUTION SUBCUTANEOUS at 20:37

## 2021-09-04 RX ADMIN — ENOXAPARIN SODIUM 135 MG: 150 INJECTION SUBCUTANEOUS at 08:20

## 2021-09-04 RX ADMIN — METOPROLOL SUCCINATE 25 MG: 25 TABLET, FILM COATED, EXTENDED RELEASE ORAL at 08:22

## 2021-09-04 RX ADMIN — DULOXETINE HYDROCHLORIDE 30 MG: 30 CAPSULE, DELAYED RELEASE ORAL at 08:15

## 2021-09-04 RX ADMIN — PANTOPRAZOLE SODIUM 40 MG: 40 TABLET, DELAYED RELEASE ORAL at 08:15

## 2021-09-04 RX ADMIN — OXYCODONE AND ACETAMINOPHEN 1 TABLET: 5; 325 TABLET ORAL at 20:17

## 2021-09-04 RX ADMIN — OXYCODONE AND ACETAMINOPHEN 1 TABLET: 5; 325 TABLET ORAL at 04:00

## 2021-09-04 RX ADMIN — CYCLOBENZAPRINE 10 MG: 10 TABLET, FILM COATED ORAL at 08:15

## 2021-09-04 RX ADMIN — TERBINAFINE 250 MG: 250 TABLET ORAL at 08:16

## 2021-09-04 ASSESSMENT — PAIN SCALES - GENERAL
PAINLEVEL_OUTOF10: 8
PAINLEVEL_OUTOF10: 9
PAINLEVEL_OUTOF10: 5
PAINLEVEL_OUTOF10: 7
PAINLEVEL_OUTOF10: 3
PAINLEVEL_OUTOF10: 4
PAINLEVEL_OUTOF10: 8

## 2021-09-04 ASSESSMENT — PAIN DESCRIPTION - LOCATION
LOCATION: ANKLE;FOOT;LEG
LOCATION: LEG

## 2021-09-04 ASSESSMENT — PAIN DESCRIPTION - PROGRESSION
CLINICAL_PROGRESSION: NOT CHANGED
CLINICAL_PROGRESSION: NOT CHANGED

## 2021-09-04 ASSESSMENT — PAIN DESCRIPTION - ONSET
ONSET: ON-GOING
ONSET: ON-GOING

## 2021-09-04 ASSESSMENT — PAIN DESCRIPTION - ORIENTATION
ORIENTATION: LEFT
ORIENTATION: LEFT

## 2021-09-04 ASSESSMENT — PAIN DESCRIPTION - PAIN TYPE
TYPE: ACUTE PAIN
TYPE: ACUTE PAIN

## 2021-09-04 ASSESSMENT — PAIN DESCRIPTION - FREQUENCY
FREQUENCY: CONTINUOUS
FREQUENCY: CONTINUOUS

## 2021-09-04 ASSESSMENT — PAIN DESCRIPTION - DESCRIPTORS
DESCRIPTORS: ACHING;DISCOMFORT;SORE
DESCRIPTORS: ACHING;DISCOMFORT;DULL

## 2021-09-04 ASSESSMENT — PAIN SCALES - WONG BAKER: WONGBAKER_NUMERICALRESPONSE: 0

## 2021-09-04 ASSESSMENT — PAIN - FUNCTIONAL ASSESSMENT: PAIN_FUNCTIONAL_ASSESSMENT: PREVENTS OR INTERFERES SOME ACTIVE ACTIVITIES AND ADLS

## 2021-09-04 NOTE — PROGRESS NOTES
Pharmacy Consultation Note  (Warfarin Dosing and Monitoring)    Initial consult date: 08/31/2021  Consulting physician: MERT Brown    Allergies:  Seasonal and Tramadol    50 y.o. male    Ht Readings from Last 1 Encounters:   08/31/21 6' 2\" (1.88 m)     Wt Readings from Last 1 Encounters:   08/31/21 290 lb (131.5 kg)         Warfarin Indication Target   INR Range Home Dose  (if applicable) Diet/Feeding Tube   (Enteral feeds, nutritional drinks, increased Vitamin K in diet can decrease INR)   DVT/PE 2-3 7.5 mg daily Regular       x Home Med? Meds Increasing INR x Home Med?  Meds Decreasing INR     Allopurinol    Azathioprine     Amiodarone/Propafenone/Dronedarone   Carbamazepine     Androgens   Cholestyramine     Chemotherapy (BBW: Capecitabine)   Estrogen     Ciprofloxacin/Levofloxacin   Nafcillin/Dicloxacillin     Clarithromycin/Erythromycin/Azithromycin   Barbiturates      Fluconazole/Itraconazole/Voriconazole/Ketoconazole   Phenytoin (Variable)     Metronidazole   Rifampin     Phenytoin (Variable)   Steroids (Variable/Dose Dependent)   X Y  Statins/Fenofibrate/Gemfibrozil   Sucralfate     Steroids (Variable/Dose Dependent)   Other:     Sulfamethoxazole/Trimethoprim        Tramadol         Other:        Comments regarding medication interactions:      x Diseases Affecting INR x Increased Bleeding Risk    CHF Exacerbation (Increases)  History GI Bleed/PUD    Liver Disease (Increases)  Chronic NSAID Use    Thyroid: Hyper (Increases)  Hypo (Decreases)  Chronic ASA/Antiplatelet Use (Clopidogrel/ Dipyridamole/Prasugrel/Ticagrelor)      Malignancy (Increases)  Abnormal Renal Function (dialysis, renal transplant, SCr ? 2.3 mg/dL)     History of EtOH Abuse: Acute (Increases)   Chronic (Decreases)  Liver Function (cirrhosis, bilirubin >2x ULN with AST/ALT/AP >3x ULN)    Fever (Increases)  Age > 65 years    Acute infection (Increases)  Hypertension/Uncontrolled BP    Diarrhea/Dehydration (Increases)  History of stroke    Other: __________________  Other:___________________       Vitamin K or Blood product  Administration Date                    TSH:    Lab Results   Component Value Date    TSH 1.650 03/29/2021        Hepatic Function Panel:                            Lab Results   Component Value Date    ALKPHOS 141 09/01/2021    ALT 32 09/01/2021    AST 19 09/01/2021    PROT 6.4 09/01/2021    BILITOT <0.2 09/01/2021    BILIDIR <0.2 09/22/2020    IBILI see below 09/22/2020    LABALBU 3.5 09/01/2021    LABALBU 4.8 05/11/2012       Date Warfarin Dose INR Heparin or LMWH HGB/HCT PLT Comment   8/31 5 mg 1.6 Enoxaparin 135 mg SubQ BID 12.4/37.0 651    9/1 7.5 mg 1.2 Enoxaparin 135 mg SubQ BID 11.2/34.0 663    9/2 7.5 mg 1.4 Enoxaparin 135 mg SubQ BID 11.7/36.1 696    9/3 7.5 mg 1.9 Enoxaparin 135 mg SubQ BID 11.5/34.7 678    9/4 <7.5 mg> 2.0 Enoxaparin 135 mg SubQ BID        Assessment:  · Patient is a 49 yo male   · Patient is on warfarin for history of recurrent DVT, s/p vena cava filter and recommended life long anticoagulation  · Patient reports compliance with warfarin but non-compliance with INR checks, reports INR taken 3 weeks ago 3.2, states he has been checking at his friends house   · Goal INR 2-3, 2.5 per vascular notes  · 9/1: INR 1.2 today, plan bridge enoxaparin until PT/INR therapeutic per vascular notes.  Resume home dosing   · 9/2: INR 1.4 today, continue home dosing   · 9/3: INR 1.9 today, continue home dosing   · 9/4: INR 2.0 today, continue same    Plan  · Warfarin 7.5 mg tonight  · Daily PT/INR until the INR is stable within the therapeutic range  · Pharmacist will follow and monitor/adjust dosing as necessary    Thank you for this consult,    Tammy Rivas, PharmD   Pharmacy Resident   Phone: [phone]   9/4/2021 8:42 AM

## 2021-09-04 NOTE — PROGRESS NOTES
oxyCODONE-acetaminophen, melatonin, loperamide, ondansetron **OR** ondansetron, polyethylene glycol, acetaminophen **OR** acetaminophen, potassium chloride **OR** potassium alternative oral replacement **OR** potassium chloride, magnesium sulfate, glucose, dextrose, glucagon (rDNA), dextrose    OBJECTIVE:  /64   Pulse 76   Temp 97.4 °F (36.3 °C) (Temporal)   Resp 18   Ht 6' 2\" (1.88 m)   Wt 290 lb (131.5 kg)   SpO2 98%   BMI 37.23 kg/m²   Temp  Av.2 °F (36.2 °C)  Min: 96.8 °F (36 °C)  Max: 97.4 °F (36.3 °C)     Constitutional: The patient is awake, alert, and oriented. Skin:  No rash . Proximal streaking left upper leg, improved erythema, warm left lower leg with raised, soft, erythematous nodules. [place] St[place] HEENT: Round and reactive pupils. Moist mucous membranes. No ulcerations or thrush. Neck: Supple to movements. Chest: Clear   Cardiovascular: S1 and S2 are rhythmic and regular. No murmurs appreciated.    Abdomen: Soft, non tender   Extremities: Erythema, proximal streaking left upper leg resolving, +2 pitting edema LLE  Lines: peripheral    Laboratory and Tests Review:  Lab Results   Component Value Date    WBC 8.8 2021    WBC 9.6 2021    WBC 10.0 2021    HGB 11.5 (L) 2021    HCT 34.7 (L) 2021    MCV 90.4 2021     (H) 2021     Lab Results   Component Value Date    NEUTROABS 4.46 2021    NEUTROABS 5.34 2021    NEUTROABS 8.72 (H) 2021     No results found for: [facility]  Lab Results   Component Value Date    ALT 32 2021    AST 19 2021    ALKPHOS 141 (H) 2021    BILITOT <0.2 2021     Lab Results   Component Value Date     2021    K 3.2 2021    K 3.0 2021     2021    CO2 23 2021    BUN 8 2021    CREATININE 0.8 2021    CREATININE 0.7 2021    CREATININE 0.8 2021    GFRAA >60 2021    LABGLOM >60 2021    GLUCOSE 119 2021    GLUCOSE 125 05/11/2012    PROT 6.4 09/01/2021    LABALBU 3.5 09/01/2021    LABALBU 4.8 05/11/2012    CALCIUM 8.7 09/03/2021    BILITOT <0.2 09/01/2021    ALKPHOS 141 09/01/2021    AST 19 09/01/2021    ALT 32 09/01/2021     Lab Results   Component Value Date    CRP 4.0 (H) 08/31/2021    CRP 16.9 (H) 10/10/2020    CRP 13.4 (H) 09/24/2020     Lab Results   Component Value Date    SEDRATE 50 (H) 08/31/2021    SEDRATE 95 (H) 09/24/2020    SEDRATE 28 (H) 06/30/2019     Radiology:  rerviewed    Microbiology:   Lab Results   Component Value Date    BC 5 Days no growth 09/17/2020    BC 5 Days no growth 07/31/2020    BC 5 Days- no growth 06/30/2019    ORG Staphylococcus warneri 08/31/2021    ORG Staphylococcus warneri 08/31/2021    ORG Corynebacterium species 08/01/2020    ORG Pseudomonas aeruginosa 08/01/2020     Lab Results   Component Value Date    BLOODCULT2 24 Hours no growth 09/02/2021    BLOODCULT2 Previously positive blood culture called 08/31/2021    BLOODCULT2 5 Days no growth 07/31/2020    ORG Staphylococcus warneri 08/31/2021    ORG Staphylococcus warneri 08/31/2021    ORG Corynebacterium species 08/01/2020    ORG Pseudomonas aeruginosa 08/01/2020     WOUND/ABSCESS   Date Value Ref Range Status   08/01/2020 Heavy growth  Final   08/01/2020 Rare growth  Final   06/30/2019 (A)  Corrected    Mixed jf isolated. Further workup and sensitivity testing  is not routinely indicated and will not be performed. Mixed jf isolated includes:  Gram negative rods  Alpha hemolytic Strep species  Physician requested gram negative margaret workup     06/30/2019   Corrected    Heavy growth  Methicillin resistant Staph aureus isolated. Most Methicillin  resistant Staphylococcus are usually resistant to multiple  antibiotics including other B-Lactams, Aminoglycosides,  Macrolides, Clindamycin and Tetracycline. Contact isolation  is indicated.      06/30/2019 Light growth  Corrected     No results found for: RESPSMEAR  No results found for: Cinda Sanchez, LABLEGI, AFBCX, FUNGSM, LABFUNG  No results found for: CULTRESP  No results found for: CXCATHTIP  Body Fluid Culture, Sterile   Date Value Ref Range Status   08/03/2020 Growth not present  Final     No results found for: CXSURG  Creatinine Ur POCT   Date Value Ref Range Status   10/21/2019 200mg/dL  Final   08/22/2018 300  Final     MRSA Culture Only   Date Value Ref Range Status   09/17/2020 Methicillin resistant Staph aureus not isolated  Final   10/19/2016 Methicillin resistant Staph aureus not isolated  Final   10/11/2016 Heavy growth  Final       ASSESSMENT:  · Staphylococcus warneri bacteremia - methicillin sensitive on cx however resistance on PCR  · Lymphangitis Left lower extremity  · DVT left leg  · Left hip pain - r/o septic arthroplasty     PLAN:      · Cont Daptomycin 6 mg / kg IV q 24 hr  for 6 weeks  · Echo   · Check bone scan  · Cont miconazole, lamisil  · Place picc line   · Monitor labs  · Will follow with you    Alexander Castillo MD  10:21 AM  9/4/2021

## 2021-09-04 NOTE — PROGRESS NOTES
Department of Orthopedic Surgery  Resident Progress Note    Patient seen and examined. Pain controlled. No new complaints. Denies chest pain, shortness of breath, dizziness/lightheadedness.  + Flatulence, + BM     States he is feeling well over all, a little cold but denies fevers, chest pain, SOB, N/V/D    VITALS:  /64   Pulse 76   Temp 97.4 °F (36.3 °C) (Temporal)   Resp 18   Ht 6' 2\" (1.88 m)   Wt 290 lb (131.5 kg)   SpO2 98%   BMI 37.23 kg/m²     General: alert and oriented to person, place and time, well-developed and well-nourished, in no acute distress    MUSCULOSKELETAL:    left lower extremity:  · Blistering on the left lower extremity, most prominent on the anterior shin in variable sizes and stages of healing, compared to previous media imagines the wounds are smaller and less raised and erythematous than before  · Improved erythremia on the medial aspect of the thigh as there was on previous clinical photos  · 2+ pitting edema LLE  · Compartments soft and compressible  · No pain with log roll  · +PF/DF/EHL  · +2/4 DP & PT pulses, Brisk Cap refill, Toes warm and perfused  · Distal sensation grossly intact to Peroneals, Sural, Saphenous, and tibial nrs    CBC:   Lab Results   Component Value Date    WBC 8.8 09/03/2021    HGB 11.5 09/03/2021    HCT 34.7 09/03/2021     09/03/2021     PT/INR:    Lab Results   Component Value Date    PROTIME 21.9 09/04/2021    PROTIME 38.0 06/07/2021    INR 2.0 09/04/2021         ASSESSMENT  · DVT left leg, recurrent  · Lymphangitis LLE  · Bacteremia, staph warneri      PLAN      · Continue physical therapy and protocol: WBAT - LLE  · Low suspicion for septic arthritis, no acute orthopedic intervention needed  · 24 hour abx coverage, per ID  · Deep venous thrombosis prophylaxis - per vascular  · Pain Control per primary team  · Monitor H&H  · D/C Plan:  Per primary team    Electronically signed by Kathy Garcia DO on 9/4/2021 at 10:42 AM

## 2021-09-04 NOTE — PROGRESS NOTES
Hospitalist Progress Note      PCP: IGOR Giron CNP    Date of Admission: 8/31/2021    Chief Complaint: LLE cellulitis     Hospital Course: Patient admitted on 8/31/2021 for cellulitis of the LLE. He  Presented with a complaint of LLE redness, pain and swelling for 3 days prior to admission. He has a history of DVT and PE for which he is chronically anticoagulated on coumadin.  He had not been compliant with his INR checks.  On admission he was found to have left iliac and common femoral vein occlusive thrombosis.  He has been managed for cellulitis of the left lower extremity as well as occlusive DVT of the left lower extremity.     Subjective: Patient seen and examined by me personally he is doing clinically better still have left lower extremity swelling less cellulitis      Medications:  Reviewed    Infusion Medications    sodium chloride      dextrose       Scheduled Medications    warfarin  7.5 mg Oral Once    lidocaine PF  5 mL IntraDERmal Once    sodium chloride flush  5-40 mL IntraVENous 2 times per day    heparin flush  3 mL IntraVENous 2 times per day    daptomycin (CUBICIN) IVPB  6 mg/kg (Adjusted) IntraVENous Q24H    terbinafine  250 mg Oral Daily    miconazole nitrate   Topical BID    enoxaparin  1 mg/kg SubCUTAneous BID    cyclobenzaprine  10 mg Oral TID    DULoxetine  30 mg Oral Daily    fenofibrate  54 mg Oral Daily    ferrous sulfate  325 mg Oral BID    hydroxyurea  500 mg Oral BID    insulin glargine  25 Units SubCUTAneous Nightly    lisinopril  5 mg Oral Daily    metoprolol succinate  25 mg Oral Daily    pantoprazole  40 mg Oral Daily    pravastatin  20 mg Oral Nightly    pregabalin  75 mg Oral BID    warfarin (COUMADIN) daily dosing (placeholder)   Other RX Placeholder    insulin lispro  0-6 Units SubCUTAneous TID     insulin lispro  0-3 Units SubCUTAneous Nightly     PRN Meds: sodium chloride flush, sodium chloride, heparin flush, TROPONINI in the last 72 hours. 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   9/2/2021-Blood cultures NGTD  -2D echo pending.   -patient also known to have cervical hardware as well as bilateral hip replacement  -Check bone scan   -continue on daptomycin 6 mg/kg every 24 hours for 6 weeks   -PICC line for long-term IV antibiotics    DVT of the LLE  -on coumadin. INR today is 2.0   Being bridged with lovenox. Consider Discontinue lovenox tomorrow once INR is between 2-3     Type 2 diabetes mellitus   complicated by peripheral neuropathy  -on lantus 25 units daily. -ISS. Accuchecks ACHS  -on pregabalin     Hyperlipidemia: on fenofibrate     Hypertension: on lisinopril and metoprolol     Hypokalemia:resolved. DVT Prophylaxis: Lovenox   Diet: ADULT DIET;  Regular; 3 carb choices (45 gm/meal)  Code Status: Full Code    PT/OT Eval Status: NA     Dispo - home     Janet Casiano MD

## 2021-09-04 NOTE — PROGRESS NOTES
Vascular Access Procedure Note    Procedure Date:   9/4/2021    Pre-procedure Verification/Time-Out:  The proposed risks versus benefits of this procedure were discussed in detail by the physician with patient. written consent was obtained from the patient. Relevant documentation was reviewed prior to procedure including signed consent form and medications. All necessary equipment for procedure is available at time of procedure yes. An audible time out was done at 1145AM by team members, correctly identifying patients name, medical record number, correct side, correct site, and correct procedure to be performed with registered nurse members of the procedure team all in agreement.         Indication for Procedure:   Reason for Insertion: intravenous antibiotics    ASA Assessment (Required for Moderate & Deep Sedation):      Procedure:   Reason for Consultation: power PICC line    Clinician Performing Procedure:   Pillo Costa rn    Assistant:  none    Sedation:   Analgesia Used: lidocaine 1%    Procedure Details/Findings:  Catheter Persian Size: 5.5  Lot Number: [id number]  Product #: [id number]  Expiration Date: 05/31/2022  Maximum Barrier Precautions: cap, eye shield, full body drape, gloves, gown, handwashing and mask  Skin Prep: chlorhexidine  Technique: modified seldinger and ultrasound guided with VPS  Attempts: 1  Exposed (cm): 1  Total (cm): 44  Placement Site: right brachial  Vessel Size: 0.60  Dressing: securement device, transparent dressing and biopatch  Blood Return: Yes   Ultrasound Guidance: Yes   Arm Circumference Mid-Bicep (cm): 30  Chest X-Ray Ordered: VasoNova VPS  End Placement: svc/caj    Complications:   none     Post-operative Condition:  stable  Patient Tolerated Procedure: well     Comments/Post-operative Education:   Post Procedure Interventions: patient verbalized understanding of education    Em Bonilla RN  09/04/21  12:05 PM

## 2021-09-05 VITALS
OXYGEN SATURATION: 98 % | WEIGHT: 290 LBS | TEMPERATURE: 97.5 F | RESPIRATION RATE: 18 BRPM | BODY MASS INDEX: 37.22 KG/M2 | HEIGHT: 74 IN | DIASTOLIC BLOOD PRESSURE: 61 MMHG | SYSTOLIC BLOOD PRESSURE: 127 MMHG | HEART RATE: 77 BPM

## 2021-09-05 LAB
LV EF: 63 %
LVEF MODALITY: NORMAL
METER GLUCOSE: 83 MG/DL (ref 74–99)
METER GLUCOSE: 94 MG/DL (ref 74–99)

## 2021-09-05 PROCEDURE — 6360000002 HC RX W HCPCS: Performed by: NURSE PRACTITIONER

## 2021-09-05 PROCEDURE — 6370000000 HC RX 637 (ALT 250 FOR IP): Performed by: NURSE PRACTITIONER

## 2021-09-05 PROCEDURE — 6370000000 HC RX 637 (ALT 250 FOR IP): Performed by: SURGERY

## 2021-09-05 PROCEDURE — 2580000003 HC RX 258: Performed by: NURSE PRACTITIONER

## 2021-09-05 PROCEDURE — 82962 GLUCOSE BLOOD TEST: CPT

## 2021-09-05 PROCEDURE — 93306 TTE W/DOPPLER COMPLETE: CPT

## 2021-09-05 PROCEDURE — 6360000002 HC RX W HCPCS: Performed by: SURGERY

## 2021-09-05 PROCEDURE — 6370000000 HC RX 637 (ALT 250 FOR IP): Performed by: FAMILY MEDICINE

## 2021-09-05 RX ADMIN — METOPROLOL SUCCINATE 25 MG: 25 TABLET, FILM COATED, EXTENDED RELEASE ORAL at 09:24

## 2021-09-05 RX ADMIN — HYDROXYUREA 500 MG: 500 CAPSULE ORAL at 09:24

## 2021-09-05 RX ADMIN — CYCLOBENZAPRINE 10 MG: 10 TABLET, FILM COATED ORAL at 09:23

## 2021-09-05 RX ADMIN — FENOFIBRATE 54 MG: 54 TABLET ORAL at 09:23

## 2021-09-05 RX ADMIN — Medication: at 09:08

## 2021-09-05 RX ADMIN — OXYCODONE AND ACETAMINOPHEN 1 TABLET: 5; 325 TABLET ORAL at 05:03

## 2021-09-05 RX ADMIN — FERROUS SULFATE TAB 325 MG (65 MG ELEMENTAL FE) 325 MG: 325 (65 FE) TAB at 09:23

## 2021-09-05 RX ADMIN — DULOXETINE HYDROCHLORIDE 30 MG: 30 CAPSULE, DELAYED RELEASE ORAL at 09:23

## 2021-09-05 RX ADMIN — SODIUM CHLORIDE, PRESERVATIVE FREE 300 UNITS: 5 INJECTION INTRAVENOUS at 09:47

## 2021-09-05 RX ADMIN — TERBINAFINE 250 MG: 250 TABLET ORAL at 09:23

## 2021-09-05 RX ADMIN — LISINOPRIL 5 MG: 10 TABLET ORAL at 09:23

## 2021-09-05 RX ADMIN — CYCLOBENZAPRINE 10 MG: 10 TABLET, FILM COATED ORAL at 13:19

## 2021-09-05 RX ADMIN — ENOXAPARIN SODIUM 135 MG: 150 INJECTION SUBCUTANEOUS at 09:24

## 2021-09-05 RX ADMIN — PREGABALIN 75 MG: 75 CAPSULE ORAL at 09:24

## 2021-09-05 RX ADMIN — Medication 10 ML: at 10:38

## 2021-09-05 RX ADMIN — OXYCODONE AND ACETAMINOPHEN 1 TABLET: 5; 325 TABLET ORAL at 13:19

## 2021-09-05 RX ADMIN — PANTOPRAZOLE SODIUM 40 MG: 40 TABLET, DELAYED RELEASE ORAL at 09:24

## 2021-09-05 RX ADMIN — OXYCODONE AND ACETAMINOPHEN 1 TABLET: 5; 325 TABLET ORAL at 09:24

## 2021-09-05 RX ADMIN — OXYCODONE AND ACETAMINOPHEN 1 TABLET: 5; 325 TABLET ORAL at 00:11

## 2021-09-05 ASSESSMENT — PAIN DESCRIPTION - PAIN TYPE: TYPE: ACUTE PAIN

## 2021-09-05 ASSESSMENT — PAIN DESCRIPTION - LOCATION: LOCATION: LEG

## 2021-09-05 ASSESSMENT — PAIN SCALES - GENERAL
PAINLEVEL_OUTOF10: 9
PAINLEVEL_OUTOF10: 7
PAINLEVEL_OUTOF10: 7
PAINLEVEL_OUTOF10: 0
PAINLEVEL_OUTOF10: 9

## 2021-09-05 ASSESSMENT — PAIN DESCRIPTION - ORIENTATION: ORIENTATION: LEFT

## 2021-09-05 ASSESSMENT — PAIN DESCRIPTION - ONSET: ONSET: ON-GOING

## 2021-09-05 ASSESSMENT — PAIN DESCRIPTION - PROGRESSION: CLINICAL_PROGRESSION: NOT CHANGED

## 2021-09-05 ASSESSMENT — PAIN DESCRIPTION - DESCRIPTORS: DESCRIPTORS: ACHING;DISCOMFORT;SORE

## 2021-09-05 ASSESSMENT — PAIN DESCRIPTION - FREQUENCY: FREQUENCY: CONTINUOUS

## 2021-09-05 NOTE — DISCHARGE SUMMARY
Hospital Medicine Discharge Summary    Patient ID: Dora Clark      Patient's PCP: IGOR Cintron - CNP    Admit Date: 8/31/2021     Discharge Date:  9/5/21    Admitting Physician: Fabián Hagan DO     Discharge Physician: Nirmal Taylor MD     Discharge Diagnoses: Active Hospital Problems    Diagnosis Date Noted    Cellulitis of left lower extremity [I88.519] 08/31/2021    Dermatophytosis [B35.9] 08/31/2021    Lymphedema of left leg [I89.0] 08/31/2021    Blister of left leg [S80.822A] 08/31/2021    Recurrent acute deep vein thrombosis (DVT) of left lower extremity (HCC) [I82.402] 08/31/2021    S/P IVC filter [Z95.828] 08/31/2021    History of deep vein thrombosis [Z86.718] 08/31/2021    Subtherapeutic international normalized ratio (INR) [R79.1] 08/31/2021    Inferior vena cava occlusion [facility]) [I82.220] 08/31/2021       The patient was seen and examined on day of discharge and this discharge summary is in conjunction with any daily progress note from day of discharge. Hospital Course:     Patient admitted on 8/31/2021 for cellulitis of the LLE. He  Presented with a complaint of LLE redness, pain and swelling for 3 days prior to admission.  He has a history of DVT and PE for which he is chronically anticoagulated on coumadin.  He had not been compliant with his INR checks.  On admission he was found to have left iliac and common femoral vein occlusive thrombosis.  He has been managed for cellulitis of the left lower extremity as well as occlusive DVT of the left lower extremity on Coumadin  Patient  doing clinically better still have left lower extremity swelling less cellulitis-  Cont Daptomycin 6 mg / kg IV q 24 hr  for 6 weeks     Cellulitis of the LLE  -on Daptomycin 6 mg / kg IV q 24 hr  for 6 weeks  -ID  F/u as outpatient     Bacteremia  -blood cultures on 8/31/21 growing Staph warneri MRSA .   9/2/2021-Blood cultures NGTD  -2D echo Summary   Normal left ventricular size and systolic function. Ejection fraction is visually estimated at 60-65%. Normal diastolic function. No regional wall motion abnormalities seen. Normal left ventricular wall thickness. Normal right ventricular size and function. No significant valvular abnormalities. No valvular vegetations seen.   -patient also known to have cervical hardware as well as bilateral hip replacement  -Check bone scan   Impression   Findings compatible with degenerative changes    No convincing evidence for osteomyelitis of the left or right hip. Mild tracer uptake on the left at the level of the greater trochanter   a nonspecific finding     -continue on daptomycin 6 mg/kg every 24 hours for 6 weeks   -s/p PICC line for long-term IV antibiotics     DVT of the LLE  -on coumadin. INR today is 2.0   Being bridged with lovenox. continue coumadin     Type 2 diabetes mellitus   complicated by peripheral neuropathy  -on lantus 25 units daily. -ISS. Accuchecks ACHS  -on pregabalin     Hyperlipidemia: on fenofibrate     Hypertension: on lisinopril and metoprolol     Hypokalemia:resolved.         Exam:     /61   Pulse 77   Temp 97.5 °F (36.4 °C) (Temporal)   Resp 18   Ht 6' 2\" (1.88 m)   Wt 290 lb (131.5 kg)   SpO2 98%   BMI 37.23 kg/m²     General appearance: No apparent distress, appears stated age and cooperative. HEENT: Pupils equal, round, and reactive to light. Conjunctivae/corneas clear. Neck: Supple, with full range of motion. No jugular venous distention. Trachea midline. Respiratory:  Normal respiratory effort. Clear to auscultation, bilaterally without Rales/Wheezes/Rhonchi. Cardiovascular: Regular rate and rhythm with normal S1/S2 without murmurs, rubs or gallops. Abdomen: Soft, non-tender, non-distended with normal bowel sounds. Musculoskeletal: No clubbing, cyanosis or edema bilaterally. Full range of motion without deformity. Skin: Skin -LLE redness, swelling +rashes or lesions.   Neurologic: Neurovascularly intact without any focal sensory/motor deficits. Cranial nerves: II-XII intact, grossly non-focal.  Psychiatric: Alert and oriented, thought content appropriate, normal insight      Consults:     IP CONSULT TO VASCULAR SURGERY  IP CONSULT TO INTERNAL MEDICINE  IP CONSULT TO INFECTIOUS DISEASES  PHARMACY TO DOSE VANCOMYCIN  IP CONSULT TO PHARMACY  IP CONSULT TO ORTHOPEDIC SURGERY    Significant Diagnostic Studies:     mpression   There is evidence for deep venous thrombosis, when compared to   previous there was nonocclusive thrombus seen in this region of the   left iliac vein and the common femoral vein. The thrombus is now   occlusive and appears worse. Impression   Findings compatible with degenerative changes    No convincing evidence for osteomyelitis of the left or right hip. Mild tracer uptake on the left at the level of the greater trochanter   a nonspecific finding         Disposition:  home    Discharge Instructions/Follow-up:  ID, PCP    Code Status:  Full Code     Activity: activity as tolerated    Diet: cardiac diet and diabetic diet    Labs: For convenience and continuity at follow-up the following most recent labs are provided:      CBC:    Lab Results   Component Value Date    WBC 8.8 09/03/2021    HGB 11.5 09/03/2021    HCT 34.7 09/03/2021     09/03/2021       Renal:    Lab Results   Component Value Date     09/03/2021    K 3.2 09/03/2021    K 3.0 09/01/2021     09/03/2021    CO2 23 09/03/2021    BUN 8 09/03/2021    CREATININE 0.8 09/03/2021    CALCIUM 8.7 09/03/2021    PHOS 2.9 10/10/2020       Discharge Medications:     Current Discharge Medication List           Details   daptomycin (CUBICIN) infusion Infuse 611.4 mg intravenously every 24 hours for 14 days Compound per protocol.   Qty: 8.5 g, Refills: 0              Details   !! warfarin (COUMADIN) 2.5 MG tablet Take 2.5 mg by mouth every evening Given with Warfarin 5 mg total dose Warfarin 7.5 mg daily !! warfarin (COUMADIN) 5 MG tablet Take 5 mg by mouth every evening Given with Warfarin 2.5 mg total dose Warfarin 7.5 mg daily      insulin glargine (LANTUS) 100 UNIT/ML injection vial Inject 25 Units into the skin nightly      insulin lispro (HUMALOG) 100 UNIT/ML injection vial Inject 0-6 Units into the skin 3 times daily (before meals) *Per Sliding Scale*      lisinopril (PRINIVIL;ZESTRIL) 5 MG tablet Take 1 tablet by mouth daily  Qty: 90 tablet, Refills: 1      loperamide (IMODIUM) 2 MG capsule Take 1 capsule by mouth 4 times daily as needed for Diarrhea  Qty: 120 capsule, Refills: 3      potassium chloride (KLOR-CON M) 20 MEQ extended release tablet Take 1 tablet by mouth 2 times daily (with meals)  Qty: 60 tablet, Refills: 3      metoprolol succinate (TOPROL XL) 25 MG extended release tablet Take 1 tablet by mouth daily  Qty: 30 tablet, Refills: 0      melatonin 3 MG TABS tablet Take 3 mg by mouth nightly as needed (sleep)      cyclobenzaprine (FLEXERIL) 10 MG tablet Take 10 mg by mouth three times daily      fenofibrate (TRICOR) 54 MG tablet Take 54 mg by mouth daily      ferrous sulfate (IRON 325) 325 (65 Fe) MG tablet Take 325 mg by mouth daily (with breakfast)       pantoprazole (PROTONIX) 40 MG tablet Take 40 mg by mouth daily      DULoxetine (CYMBALTA) 30 MG extended release capsule Take 1 capsule by mouth daily  Qty: 90 capsule, Refills: 1    Associated Diagnoses: Neuropathic pain; Lumbar radiculopathy      pravastatin (PRAVACHOL) 20 MG tablet Take 1 tablet by mouth nightly  Qty: 90 tablet, Refills: 1    Associated Diagnoses: Mixed hyperlipidemia      hydroxyurea (HYDREA) 500 MG chemo capsule Take 500 mg by mouth 2 times daily       ! ! - Potential duplicate medications found. Please discuss with provider. Time Spent on discharge is more than 45 minutes in the examination, evaluation, counseling and review of medications and discharge plan.     Patient Stable at discharge  Signed:    Tramaine Galarza Marissa Chi MD   9/5/2021      Thank you IGOR Ware CNP for the opportunity to be involved in this patient's care. If you have any questions or concerns please feel free to contact me at [phone].

## 2021-09-05 NOTE — PROGRESS NOTES
Pharmacy Consultation Note  (Warfarin Dosing and Monitoring)     Initial consult date: 08/31/2021  Consulting physician: Saloni Reeves, APRN-NP     Allergies:  Seasonal and Tramadol     50 y.o. male         Ht Readings from Last 1 Encounters:   08/31/21 6' 2\" (1.88 m)          Wt Readings from Last 1 Encounters:   08/31/21 290 lb (131.5 kg)            Warfarin Indication Target   INR Range Home Dose  (if applicable) Diet/Feeding Tube   (Enteral feeds, nutritional drinks, increased Vitamin K in diet can decrease INR)   DVT/PE 2-3 7.5 mg daily Regular         x Home Med? Meds Increasing INR x Home Med?  Meds Decreasing INR       Allopurinol      Azathioprine       Amiodarone/Propafenone/Dronedarone     Carbamazepine       Androgens     Cholestyramine       Chemotherapy (BBW: Capecitabine)     Estrogen       Ciprofloxacin/Levofloxacin     Nafcillin/Dicloxacillin       Clarithromycin/Erythromycin/Azithromycin     Barbiturates        Fluconazole/Itraconazole/Voriconazole/Ketoconazole     Phenytoin (Variable)       Metronidazole     Rifampin       Phenytoin (Variable)     Steroids (Variable/Dose Dependent)   X Y  Statins/Fenofibrate/Gemfibrozil     Sucralfate       Steroids (Variable/Dose Dependent)     Other:       Sulfamethoxazole/Trimethoprim             Tramadol              Other:            Comments regarding medication interactions:        x Diseases Affecting INR x Increased Bleeding Risk     CHF Exacerbation (Increases)   History GI Bleed/PUD     Liver Disease (Increases)   Chronic NSAID Use     Thyroid: Hyper (Increases)  Hypo (Decreases)   Chronic ASA/Antiplatelet Use (Clopidogrel/ Dipyridamole/Prasugrel/Ticagrelor)       Malignancy (Increases)   Abnormal Renal Function (dialysis, renal transplant, SCr ? 2.3 mg/dL)      History of EtOH Abuse: Acute (Increases)   Chronic (Decreases)   Liver Function (cirrhosis, bilirubin >2x ULN with AST/ALT/AP >3x ULN)     Fever (Increases)   Age > 65 years     Acute infection (Increases)   Hypertension/Uncontrolled BP     Diarrhea/Dehydration (Increases)   History of stroke     Other: __________________   Other:___________________         Vitamin K or Blood product  Administration Date                               TSH:          Lab Results   Component Value Date     TSH 1.650 03/29/2021                     Hepatic Function Panel:                                  Lab Results   Component Value Date     ALKPHOS 141 09/01/2021     ALT 32 09/01/2021     AST 19 09/01/2021     PROT 6.4 09/01/2021     BILITOT <0.2 09/01/2021     BILIDIR <0.2 09/22/2020     IBILI see below 09/22/2020     LABALBU 3.5 09/01/2021     LABALBU 4.8 05/11/2012         Date Warfarin Dose INR Heparin or LMWH HGB/HCT PLT Comment   8/31 5 mg 1.6 Enoxaparin 135 mg SubQ BID 12.4/37.0 651     9/1 7.5 mg 1.2 Enoxaparin 135 mg SubQ BID 11.2/34.0 663     9/2 7.5 mg 1.4 Enoxaparin 135 mg SubQ BID 11.7/36.1 696     9/3 7.5 mg 1.9 Enoxaparin 135 mg SubQ BID 11.5/34.7 678     9/4 7.5 mg 2.0 Enoxaparin 135 mg SubQ BID         9/5                     Assessment:  · Patient is a 49 yo male   · Patient is on warfarin for history of recurrent DVT, s/p vena cava filter and recommended life long anticoagulation  · Patient reports compliance with warfarin but non-compliance with INR checks, reports INR taken 3 weeks ago 3.2, states he has been checking at his friends house   · Goal INR 2-3, 2.5 per vascular notes  · 9/1: INR 1.2 today, plan bridge enoxaparin until PT/INR therapeutic per vascular notes.  Resume home dosing   · 9/2: INR 1.4 today, continue home dosing   · 9/3: INR 1.9 today, continue home dosing   · 9/4: INR 2.0 today, continue same  · 9/5: no IINR drawn today, patient for discharge     Plan  · Patient to discharge today      Thank you for this consult,     Prateek Bai, PharmD   Pharmacy Resident   Phone: [phone]   9/4/2021 8:42 AM

## 2021-09-05 NOTE — FLOWSHEET NOTE
Per [facility], pt can discharge anytime because they will out at 5pm to hang his antibiotic dose.

## 2021-09-05 NOTE — PROGRESS NOTES
Department of Orthopedic Surgery  Resident Progress Note    Patient seen and examined. Pain controlled. No new complaints. Denies chest pain, shortness of breath, dizziness/lightheadedness. + Flatulence, + BM. He is complaining of continued pain on his medial thigh. Patient states he had a bone scan done yesterday. States he is feeling well over all, a little cold but denies fevers, chest pain, SOB, N/V/D.     VITALS:  BP (!) 104/55   Pulse 84   Temp 97.5 °F (36.4 °C) (Temporal)   Resp 18   Ht 6' 2\" (1.88 m)   Wt 290 lb (131.5 kg)   SpO2 97%   BMI 37.23 kg/m²     General: alert and oriented to person, place and time, well-developed and well-nourished, in no acute distress    MUSCULOSKELETAL:    left lower extremity:  · Blistering on the left lower extremity, most prominent on the anterior shin in variable sizes and stages of healing, compared to yesterday they do seem to be about the same  · Medial thigh erythema improved from yesterday.    · 2+ pitting edema LLE  · Compartments soft and compressible  · Pain in the posterior aspect of the hip with hip ROM, able to get the patient to 45 degrees external and 15 degrees internal before he experience pain  · +PF/DF/EHL  · +2/4 DP & PT pulses, Brisk Cap refill, Toes warm and perfused  · Distal sensation grossly intact to Peroneals, Sural, Saphenous, and tibial nrs    CBC:   Lab Results   Component Value Date    WBC 8.8 09/03/2021    HGB 11.5 09/03/2021    HCT 34.7 09/03/2021     09/03/2021     PT/INR:    Lab Results   Component Value Date    PROTIME 21.9 09/04/2021    PROTIME 38.0 06/07/2021    INR 2.0 09/04/2021         ASSESSMENT  · DVT left leg, recurrent  · Lymphangitis LLE  · Bacteremia, staph warneri      PLAN      · Continue physical therapy and protocol: WBAT - LLE  · Low suspicion for septic arthritis, no acute orthopedic intervention needed  · Bone scan showed no evidence of osteomyelitis  · 24 hour abx coverage, per ID  · Deep venous thrombosis prophylaxis - per vascular  · Pain Control per primary team  · Monitor H&H, most recent 11.5  · D/C Plan:  Per primary team  · Will continue to follow closely for any changes

## 2021-09-05 NOTE — PROGRESS NOTES
[address] [place] [address] [facility]  Progress Note      C/C : Staph warneri bacteremia       SUBJECTIVE:  Denies fever or chills  Reports pain in the hip, leg , leg swelling   Afebrile     No nausea, vomiting. Reports chronic intermittent diarrhea for last 2 years follow colon resection. His pain when he ambulates with the left leg and hip    Microbiology:   3/21/2016:   - Nares MRSA  10/11/2016:   - Nares MRSA  6/30/2016:   - Foot MRSA, Pantoea spp  - Blood no growth  7/31/2020  - Blood no growth  8/1/2020  - Coccyx ulcer Corynebacterium, Psuedomonas aeruginosa   8/3/2020  - Gall Bladder fluid no growth  9/17/2020  - Blood no growth  8/31/2021  - Blood culture methicillin-resistant Staphylococcus species-Staphylococcus kelly  9/2/2021  -Blood cultures NGTD    Review of systems:  As stated above in the chief complaint, otherwise negative.     Medications:  Scheduled Meds:   lidocaine PF  5 mL IntraDERmal Once    sodium chloride flush  5-40 mL IntraVENous 2 times per day    heparin flush  3 mL IntraVENous 2 times per day    daptomycin (CUBICIN) IVPB  6 mg/kg (Adjusted) IntraVENous Q24H    terbinafine  250 mg Oral Daily    miconazole nitrate   Topical BID    enoxaparin  1 mg/kg SubCUTAneous BID    cyclobenzaprine  10 mg Oral TID    DULoxetine  30 mg Oral Daily    fenofibrate  54 mg Oral Daily    ferrous sulfate  325 mg Oral BID    hydroxyurea  500 mg Oral BID    insulin glargine  25 Units SubCUTAneous Nightly    lisinopril  5 mg Oral Daily    metoprolol succinate  25 mg Oral Daily    pantoprazole  40 mg Oral Daily    pravastatin  20 mg Oral Nightly    pregabalin  75 mg Oral BID    warfarin (COUMADIN) daily dosing (placeholder)   Other RX Placeholder    insulin lispro  0-6 Units SubCUTAneous TID     insulin lispro  0-3 Units SubCUTAneous Nightly     Continuous Infusions:   sodium chloride      dextrose       PRN Meds:sodium chloride flush, sodium chloride, heparin flush, oxyCODONE-acetaminophen, melatonin, loperamide, ondansetron **OR** ondansetron, polyethylene glycol, acetaminophen **OR** acetaminophen, potassium chloride **OR** potassium alternative oral replacement **OR** potassium chloride, magnesium sulfate, glucose, dextrose, glucagon (rDNA), dextrose    OBJECTIVE:  /61   Pulse 77   Temp 97.5 °F (36.4 °C) (Temporal)   Resp 18   Ht 6' 2\" (1.88 m)   Wt 290 lb (131.5 kg)   SpO2 98%   BMI 37.23 kg/m²   Temp  Av.5 °F (36.4 °C)  Min: 97.4 °F (36.3 °C)  Max: 97.5 °F (36.4 °C)     Constitutional: The patient is awake, alert, and oriented. Skin:  No rash . Proximal streaking left upper leg, improved erythema, warm left lower leg with raised, soft, erythematous nodules. Douglas Oh HEENT: Round and reactive pupils. Moist mucous membranes. No ulcerations or thrush. Neck: Supple to movements. Chest: Clear   Cardiovascular: S1 and S2 are rhythmic and regular. No murmurs appreciated.    Abdomen: Soft, non tender   Extremities: Erythema, proximal streaking left upper leg resolving, +2 pitting edema LLE  Lines: peripheral    Laboratory and Tests Review:  Lab Results   Component Value Date    WBC 8.8 2021    WBC 9.6 2021    WBC 10.0 2021    HGB 11.5 (L) 2021    HCT 34.7 (L) 2021    MCV 90.4 2021     (H) 2021     Lab Results   Component Value Date    NEUTROABS 4.46 2021    NEUTROABS 5.34 2021    NEUTROABS 8.72 (H) 2021     No results found for: [facility]  Lab Results   Component Value Date    ALT 32 2021    AST 19 2021    ALKPHOS 141 (H) 2021    BILITOT <0.2 2021     Lab Results   Component Value Date     2021    K 3.2 2021    K 3.0 2021     2021    CO2 23 2021    BUN 8 2021    CREATININE 0.8 2021    CREATININE 0.7 2021    CREATININE 0.8 2021    GFRAA >60 2021    LABGLOM >60 2021    GLUCOSE 119 2021    GLUCOSE Braulio Richmond, LABLEGI, AFBCX, FUNGSM, LABFUNG  No results found for: CULTRESP  No results found for: CXCATHTIP  Body Fluid Culture, Sterile   Date Value Ref Range Status   08/03/2020 Growth not present  Final     No results found for: CXSURG  Creatinine Ur POCT   Date Value Ref Range Status   10/21/2019 200mg/dL  Final   08/22/2018 300  Final     MRSA Culture Only   Date Value Ref Range Status   09/17/2020 Methicillin resistant Staph aureus not isolated  Final   10/19/2016 Methicillin resistant Staph aureus not isolated  Final   10/11/2016 Heavy growth  Final     Bone scan -    Findings compatible with degenerative changes   No convincing evidence for osteomyelitis of the left or right hip.    Mild tracer uptake on the left at the level of the greater trochanter   a nonspecific finding    ASSESSMENT:  · Staphylococcus warneri bacteremia - methicillin sensitive on cx however resistance on PCR  · Lymphangitis Left lower extremity  · DVT left leg  · Left hip pain - r/o septic arthroplasty     PLAN:      · Cont Daptomycin 6 mg / kg IV q 24 hr  for 6 weeks  · Echo   · Check bone scan  · Cont miconazole, lamisil  · Place picc line   · Monitor labs  · Will follow with you    Tunde Benjamin MD  11:00 AM  9/5/2021

## 2021-09-05 NOTE — HOME CARE
SPOKE WITH WENDY RAMIREZ AND PATIENT WILL DC TODAY EXPLAINED WE WILL SEE PATIENT FOR IV DOSE AT [facility] L[facility]   [place] [id number]

## 2021-09-07 ENCOUNTER — TELEPHONE (OUTPATIENT)
Dept: FAMILY MEDICINE CLINIC | Age: 46
End: 2021-09-07

## 2021-09-07 LAB — CULTURE, BLOOD 2: NORMAL

## 2021-09-07 NOTE — TELEPHONE ENCOUNTER
[facility] Transitions Initial Follow Up Call    Outreach made within 2 business days of discharge: Yes    Patient: Lou Roe II Patient : [id number]   MRN: [id number]  Reason for Admission: There are no discharge diagnoses documented for the most recent discharge. Discharge Date: 21       Spoke with: phone not able to receive calls     Discharge department/facility: [address] [place] [address]      Scheduled appointment with PCP within 7-14 days    Follow Up  No future appointments.     Bernadette Knight

## 2021-09-08 ENCOUNTER — APPOINTMENT (OUTPATIENT)
Dept: ULTRASOUND IMAGING | Age: 46
DRG: 383 | End: 2021-09-08
Payer: MEDICARE

## 2021-09-08 ENCOUNTER — APPOINTMENT (OUTPATIENT)
Dept: GENERAL RADIOLOGY | Age: 46
DRG: 383 | End: 2021-09-08
Payer: MEDICARE

## 2021-09-08 ENCOUNTER — HOSPITAL ENCOUNTER (INPATIENT)
Age: 46
LOS: 2 days | Discharge: HOME HEALTH | DRG: 383 | End: 2021-09-10
Attending: EMERGENCY MEDICINE | Admitting: FAMILY MEDICINE
Payer: MEDICARE

## 2021-09-08 DIAGNOSIS — L03.116 CELLULITIS OF LEFT LOWER EXTREMITY: ICD-10-CM

## 2021-09-08 DIAGNOSIS — N17.9 AKI (ACUTE KIDNEY INJURY) (HCC): Primary | ICD-10-CM

## 2021-09-08 LAB
ALBUMIN SERPL-MCNC: 4.4 G/DL (ref 3.5–5.2)
ALP BLD-CCNC: 138 U/L (ref 40–129)
ALT SERPL-CCNC: 26 U/L (ref 0–40)
ANION GAP SERPL CALCULATED.3IONS-SCNC: 18 MMOL/L (ref 7–16)
AST SERPL-CCNC: 26 U/L (ref 0–39)
BASOPHILS ABSOLUTE: 0.1 E9/L (ref 0–0.2)
BASOPHILS RELATIVE PERCENT: 0.6 % (ref 0–2)
BILIRUB SERPL-MCNC: 0.2 MG/DL (ref 0–1.2)
BUN BLDV-MCNC: 10 MG/DL (ref 6–20)
CALCIUM SERPL-MCNC: 9.4 MG/DL (ref 8.6–10.2)
CHLORIDE BLD-SCNC: 107 MMOL/L (ref 98–107)
CO2: 19 MMOL/L (ref 22–29)
CREAT SERPL-MCNC: 1.8 MG/DL (ref 0.7–1.2)
EOSINOPHILS ABSOLUTE: 0.06 E9/L (ref 0.05–0.5)
EOSINOPHILS RELATIVE PERCENT: 0.4 % (ref 0–6)
GFR AFRICAN AMERICAN: 49
GFR NON-AFRICAN AMERICAN: 40 ML/MIN/1.73
GLUCOSE BLD-MCNC: 135 MG/DL (ref 74–99)
HCT VFR BLD CALC: 40 % (ref 37–54)
HEMOGLOBIN: 13.2 G/DL (ref 12.5–16.5)
IMMATURE GRANULOCYTES #: 0.11 E9/L
IMMATURE GRANULOCYTES %: 0.7 % (ref 0–5)
INR BLD: 1.8
LYMPHOCYTES ABSOLUTE: 1.73 E9/L (ref 1.5–4)
LYMPHOCYTES RELATIVE PERCENT: 10.6 % (ref 20–42)
MAGNESIUM: 2.1 MG/DL (ref 1.6–2.6)
MCH RBC QN AUTO: 29.9 PG (ref 26–35)
MCHC RBC AUTO-ENTMCNC: 33 % (ref 32–34.5)
MCV RBC AUTO: 90.7 FL (ref 80–99.9)
METER GLUCOSE: 127 MG/DL (ref 74–99)
METER GLUCOSE: 80 MG/DL (ref 74–99)
MONOCYTES ABSOLUTE: 1.42 E9/L (ref 0.1–0.95)
MONOCYTES RELATIVE PERCENT: 8.7 % (ref 2–12)
NEUTROPHILS ABSOLUTE: 12.87 E9/L (ref 1.8–7.3)
NEUTROPHILS RELATIVE PERCENT: 79 % (ref 43–80)
PDW BLD-RTO: 15.3 FL (ref 11.5–15)
PLATELET # BLD: 640 E9/L (ref 130–450)
PMV BLD AUTO: 9.1 FL (ref 7–12)
POTASSIUM REFLEX MAGNESIUM: 3.3 MMOL/L (ref 3.5–5)
PROTHROMBIN TIME: 19.9 SEC (ref 9.3–12.4)
RBC # BLD: 4.41 E12/L (ref 3.8–5.8)
SODIUM BLD-SCNC: 144 MMOL/L (ref 132–146)
TOTAL PROTEIN: 7.6 G/DL (ref 6.4–8.3)
WBC # BLD: 16.3 E9/L (ref 4.5–11.5)

## 2021-09-08 PROCEDURE — 82962 GLUCOSE BLOOD TEST: CPT

## 2021-09-08 PROCEDURE — 99285 EMERGENCY DEPT VISIT HI MDM: CPT

## 2021-09-08 PROCEDURE — 2580000003 HC RX 258: Performed by: NURSE PRACTITIONER

## 2021-09-08 PROCEDURE — 85610 PROTHROMBIN TIME: CPT

## 2021-09-08 PROCEDURE — 93971 EXTREMITY STUDY: CPT | Performed by: RADIOLOGY

## 2021-09-08 PROCEDURE — 02HV33Z INSERTION OF INFUSION DEVICE INTO SUPERIOR VENA CAVA, PERCUTANEOUS APPROACH: ICD-10-PCS | Performed by: INTERNAL MEDICINE

## 2021-09-08 PROCEDURE — 80053 COMPREHEN METABOLIC PANEL: CPT

## 2021-09-08 PROCEDURE — 93971 EXTREMITY STUDY: CPT

## 2021-09-08 PROCEDURE — 36569 INSJ PICC 5 YR+ W/O IMAGING: CPT

## 2021-09-08 PROCEDURE — 71045 X-RAY EXAM CHEST 1 VIEW: CPT

## 2021-09-08 PROCEDURE — 6360000002 HC RX W HCPCS: Performed by: NURSE PRACTITIONER

## 2021-09-08 PROCEDURE — 73060 X-RAY EXAM OF HUMERUS: CPT

## 2021-09-08 PROCEDURE — 6360000002 HC RX W HCPCS: Performed by: FAMILY MEDICINE

## 2021-09-08 PROCEDURE — 2060000000 HC ICU INTERMEDIATE R&B

## 2021-09-08 PROCEDURE — 2580000003 HC RX 258: Performed by: STUDENT IN AN ORGANIZED HEALTH CARE EDUCATION/TRAINING PROGRAM

## 2021-09-08 PROCEDURE — C1751 CATH, INF, PER/CENT/MIDLINE: HCPCS

## 2021-09-08 PROCEDURE — 76937 US GUIDE VASCULAR ACCESS: CPT

## 2021-09-08 PROCEDURE — 6370000000 HC RX 637 (ALT 250 FOR IP): Performed by: EMERGENCY MEDICINE

## 2021-09-08 PROCEDURE — 85025 COMPLETE CBC W/AUTO DIFF WBC: CPT

## 2021-09-08 PROCEDURE — 83735 ASSAY OF MAGNESIUM: CPT

## 2021-09-08 PROCEDURE — 6370000000 HC RX 637 (ALT 250 FOR IP): Performed by: NURSE PRACTITIONER

## 2021-09-08 RX ORDER — HYDROXYUREA 500 MG/1
500 CAPSULE ORAL 2 TIMES DAILY
Status: DISCONTINUED | OUTPATIENT
Start: 2021-09-08 | End: 2021-09-10 | Stop reason: HOSPADM

## 2021-09-08 RX ORDER — WARFARIN SODIUM 7.5 MG/1
7.5 TABLET ORAL EVERY EVENING
Status: DISCONTINUED | OUTPATIENT
Start: 2021-09-08 | End: 2021-09-10 | Stop reason: HOSPADM

## 2021-09-08 RX ORDER — SODIUM CHLORIDE 0.9 % (FLUSH) 0.9 %
5-40 SYRINGE (ML) INJECTION EVERY 12 HOURS SCHEDULED
Status: DISCONTINUED | OUTPATIENT
Start: 2021-09-08 | End: 2021-09-10 | Stop reason: HOSPADM

## 2021-09-08 RX ORDER — FENOFIBRATE 54 MG/1
54 TABLET ORAL DAILY
Status: DISCONTINUED | OUTPATIENT
Start: 2021-09-08 | End: 2021-09-10 | Stop reason: HOSPADM

## 2021-09-08 RX ORDER — LANOLIN ALCOHOL/MO/W.PET/CERES
3 CREAM (GRAM) TOPICAL NIGHTLY PRN
Status: DISCONTINUED | OUTPATIENT
Start: 2021-09-08 | End: 2021-09-10 | Stop reason: HOSPADM

## 2021-09-08 RX ORDER — FERROUS SULFATE 325(65) MG
325 TABLET ORAL
Status: DISCONTINUED | OUTPATIENT
Start: 2021-09-09 | End: 2021-09-10 | Stop reason: HOSPADM

## 2021-09-08 RX ORDER — 0.9 % SODIUM CHLORIDE 0.9 %
1000 INTRAVENOUS SOLUTION INTRAVENOUS ONCE
Status: COMPLETED | OUTPATIENT
Start: 2021-09-08 | End: 2021-09-08

## 2021-09-08 RX ORDER — SODIUM CHLORIDE, SODIUM LACTATE, POTASSIUM CHLORIDE, CALCIUM CHLORIDE 600; 310; 30; 20 MG/100ML; MG/100ML; MG/100ML; MG/100ML
INJECTION, SOLUTION INTRAVENOUS CONTINUOUS
Status: DISCONTINUED | OUTPATIENT
Start: 2021-09-08 | End: 2021-09-10 | Stop reason: HOSPADM

## 2021-09-08 RX ORDER — SODIUM CHLORIDE 9 MG/ML
25 INJECTION, SOLUTION INTRAVENOUS PRN
Status: DISCONTINUED | OUTPATIENT
Start: 2021-09-08 | End: 2021-09-10 | Stop reason: HOSPADM

## 2021-09-08 RX ORDER — POLYETHYLENE GLYCOL 3350 17 G/17G
17 POWDER, FOR SOLUTION ORAL DAILY PRN
Status: DISCONTINUED | OUTPATIENT
Start: 2021-09-08 | End: 2021-09-10 | Stop reason: HOSPADM

## 2021-09-08 RX ORDER — PANTOPRAZOLE SODIUM 40 MG/1
40 TABLET, DELAYED RELEASE ORAL DAILY
Status: DISCONTINUED | OUTPATIENT
Start: 2021-09-08 | End: 2021-09-10 | Stop reason: HOSPADM

## 2021-09-08 RX ORDER — ACETAMINOPHEN 650 MG/1
650 SUPPOSITORY RECTAL EVERY 6 HOURS PRN
Status: DISCONTINUED | OUTPATIENT
Start: 2021-09-08 | End: 2021-09-10 | Stop reason: HOSPADM

## 2021-09-08 RX ORDER — PRAVASTATIN SODIUM 20 MG
20 TABLET ORAL NIGHTLY
Status: DISCONTINUED | OUTPATIENT
Start: 2021-09-08 | End: 2021-09-10 | Stop reason: HOSPADM

## 2021-09-08 RX ORDER — DEXTROSE MONOHYDRATE 25 G/50ML
12.5 INJECTION, SOLUTION INTRAVENOUS PRN
Status: DISCONTINUED | OUTPATIENT
Start: 2021-09-08 | End: 2021-09-10 | Stop reason: HOSPADM

## 2021-09-08 RX ORDER — OXYCODONE HYDROCHLORIDE AND ACETAMINOPHEN 5; 325 MG/1; MG/1
1 TABLET ORAL EVERY 4 HOURS PRN
Status: DISCONTINUED | OUTPATIENT
Start: 2021-09-08 | End: 2021-09-10 | Stop reason: HOSPADM

## 2021-09-08 RX ORDER — HEPARIN SODIUM 1000 [USP'U]/ML
10000 INJECTION, SOLUTION INTRAVENOUS; SUBCUTANEOUS PRN
Status: DISCONTINUED | OUTPATIENT
Start: 2021-09-08 | End: 2021-09-10 | Stop reason: ALTCHOICE

## 2021-09-08 RX ORDER — INSULIN GLARGINE 100 [IU]/ML
25 INJECTION, SOLUTION SUBCUTANEOUS NIGHTLY
Status: DISCONTINUED | OUTPATIENT
Start: 2021-09-08 | End: 2021-09-10 | Stop reason: HOSPADM

## 2021-09-08 RX ORDER — OXYCODONE HYDROCHLORIDE AND ACETAMINOPHEN 5; 325 MG/1; MG/1
2 TABLET ORAL EVERY 4 HOURS PRN
Status: DISCONTINUED | OUTPATIENT
Start: 2021-09-08 | End: 2021-09-10 | Stop reason: HOSPADM

## 2021-09-08 RX ORDER — POTASSIUM CHLORIDE 20 MEQ/1
40 TABLET, EXTENDED RELEASE ORAL ONCE
Status: COMPLETED | OUTPATIENT
Start: 2021-09-08 | End: 2021-09-08

## 2021-09-08 RX ORDER — METOPROLOL SUCCINATE 25 MG/1
25 TABLET, EXTENDED RELEASE ORAL DAILY
Status: DISCONTINUED | OUTPATIENT
Start: 2021-09-08 | End: 2021-09-10 | Stop reason: HOSPADM

## 2021-09-08 RX ORDER — LOPERAMIDE HYDROCHLORIDE 2 MG/1
2 CAPSULE ORAL 4 TIMES DAILY PRN
Status: DISCONTINUED | OUTPATIENT
Start: 2021-09-08 | End: 2021-09-10 | Stop reason: HOSPADM

## 2021-09-08 RX ORDER — NICOTINE POLACRILEX 4 MG
15 LOZENGE BUCCAL PRN
Status: DISCONTINUED | OUTPATIENT
Start: 2021-09-08 | End: 2021-09-10 | Stop reason: HOSPADM

## 2021-09-08 RX ORDER — ACETAMINOPHEN 325 MG/1
650 TABLET ORAL EVERY 6 HOURS PRN
Status: DISCONTINUED | OUTPATIENT
Start: 2021-09-08 | End: 2021-09-10 | Stop reason: HOSPADM

## 2021-09-08 RX ORDER — ONDANSETRON 4 MG/1
4 TABLET, ORALLY DISINTEGRATING ORAL EVERY 8 HOURS PRN
Status: DISCONTINUED | OUTPATIENT
Start: 2021-09-08 | End: 2021-09-10 | Stop reason: HOSPADM

## 2021-09-08 RX ORDER — DULOXETIN HYDROCHLORIDE 30 MG/1
30 CAPSULE, DELAYED RELEASE ORAL DAILY
Status: DISCONTINUED | OUTPATIENT
Start: 2021-09-08 | End: 2021-09-10 | Stop reason: HOSPADM

## 2021-09-08 RX ORDER — DEXTROSE MONOHYDRATE 50 MG/ML
100 INJECTION, SOLUTION INTRAVENOUS PRN
Status: DISCONTINUED | OUTPATIENT
Start: 2021-09-08 | End: 2021-09-10 | Stop reason: HOSPADM

## 2021-09-08 RX ORDER — HEPARIN SODIUM 1000 [USP'U]/ML
5000 INJECTION, SOLUTION INTRAVENOUS; SUBCUTANEOUS PRN
Status: DISCONTINUED | OUTPATIENT
Start: 2021-09-08 | End: 2021-09-10 | Stop reason: ALTCHOICE

## 2021-09-08 RX ORDER — SODIUM CHLORIDE 0.9 % (FLUSH) 0.9 %
5-40 SYRINGE (ML) INJECTION PRN
Status: DISCONTINUED | OUTPATIENT
Start: 2021-09-08 | End: 2021-09-10 | Stop reason: HOSPADM

## 2021-09-08 RX ORDER — ONDANSETRON 2 MG/ML
4 INJECTION INTRAMUSCULAR; INTRAVENOUS EVERY 6 HOURS PRN
Status: DISCONTINUED | OUTPATIENT
Start: 2021-09-08 | End: 2021-09-10 | Stop reason: HOSPADM

## 2021-09-08 RX ORDER — HEPARIN SODIUM 10000 [USP'U]/100ML
5-30 INJECTION, SOLUTION INTRAVENOUS CONTINUOUS
Status: DISCONTINUED | OUTPATIENT
Start: 2021-09-08 | End: 2021-09-09

## 2021-09-08 RX ADMIN — HYDROXYUREA 500 MG: 500 CAPSULE ORAL at 18:31

## 2021-09-08 RX ADMIN — INSULIN GLARGINE 25 UNITS: 100 INJECTION, SOLUTION SUBCUTANEOUS at 21:41

## 2021-09-08 RX ADMIN — POTASSIUM CHLORIDE 40 MEQ: 1500 TABLET, EXTENDED RELEASE ORAL at 06:46

## 2021-09-08 RX ADMIN — METOPROLOL SUCCINATE 25 MG: 25 TABLET, EXTENDED RELEASE ORAL at 18:31

## 2021-09-08 RX ADMIN — HEPARIN SODIUM 16 UNITS/KG/HR: 10000 INJECTION, SOLUTION INTRAVENOUS at 18:21

## 2021-09-08 RX ADMIN — DULOXETINE HYDROCHLORIDE 30 MG: 30 CAPSULE, DELAYED RELEASE ORAL at 18:31

## 2021-09-08 RX ADMIN — FENOFIBRATE 54 MG: 54 TABLET ORAL at 18:31

## 2021-09-08 RX ADMIN — WARFARIN SODIUM 7.5 MG: 7.5 TABLET ORAL at 18:31

## 2021-09-08 RX ADMIN — SODIUM CHLORIDE, POTASSIUM CHLORIDE, SODIUM LACTATE AND CALCIUM CHLORIDE: 600; 310; 30; 20 INJECTION, SOLUTION INTRAVENOUS at 15:13

## 2021-09-08 RX ADMIN — OXYCODONE AND ACETAMINOPHEN 2 TABLET: 5; 325 TABLET ORAL at 17:53

## 2021-09-08 RX ADMIN — OXYCODONE AND ACETAMINOPHEN 2 TABLET: 5; 325 TABLET ORAL at 22:01

## 2021-09-08 RX ADMIN — PANTOPRAZOLE SODIUM 40 MG: 40 TABLET, DELAYED RELEASE ORAL at 18:31

## 2021-09-08 RX ADMIN — DAPTOMYCIN 600 MG: 500 INJECTION, POWDER, LYOPHILIZED, FOR SOLUTION INTRAVENOUS at 18:24

## 2021-09-08 RX ADMIN — SODIUM CHLORIDE 1000 ML: 9 INJECTION, SOLUTION INTRAVENOUS at 06:45

## 2021-09-08 ASSESSMENT — ENCOUNTER SYMPTOMS
EYE PAIN: 0
BACK PAIN: 0
COUGH: 0
SHORTNESS OF BREATH: 0
ABDOMINAL PAIN: 0
EYE DISCHARGE: 0
EYE REDNESS: 0
SINUS PRESSURE: 0
SORE THROAT: 0
NAUSEA: 0
WHEEZING: 0
DIARRHEA: 0
VOMITING: 0

## 2021-09-08 ASSESSMENT — PAIN SCALES - GENERAL
PAINLEVEL_OUTOF10: 7

## 2021-09-08 ASSESSMENT — PAIN DESCRIPTION - PAIN TYPE: TYPE: ACUTE PAIN

## 2021-09-08 ASSESSMENT — PAIN DESCRIPTION - LOCATION: LOCATION: GENERALIZED

## 2021-09-08 NOTE — H&P
Hospitalist History & Physical      PCP: Poly Lopez, APRN - CNP    Date of Admission: 9/8/2021    Date of Service: Pt seen/examined on 9/8/2021 and is admitted to Inpatient with expected LOS greater than two midnights due to medical therapy. Chief Complaint:  had concerns including Other (Pt has PICC line right arm, states he rolled over in bed and pulled it partially out. Pt believes it's torn in half). History Of Present Illness:    Mr. Paula Pacheco is a 50y.o. year old male  who  has a past medical history of Accident, Acute thrombosis of inferior vena cava ([address] SIMÓN (acute kidney injury) ([address] Allergic rhinitis, Blister of left leg, Cellulitis of leg, left, Chronic back pain, Depression, Dermatophytosis, Diabetes mellitus ([address] Difficulty sleeping, Displacement of lumbar intervertebral disc without myelopathy, Fibromyalgia, Fractured rib, H/O seasonal allergies, Head injury, History of deep vein thrombosis, Hyperlipidemia, Hypertension, 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[address] Lymphedema of left leg, Obesity (BMI 35.0-39.9 without comorbidity), Osteoarthritis, Recurrent acute deep vein thrombosis (DVT) of left lower extremity ([address] S/P IVC filter, Subtherapeutic international normalized ratio (INR), and Thoracic or lumbosacral neuritis or radiculitis, unspecified. He was recently admitted from 8/31-9/5/2021 with left lower extremity cellulitis, bacteremia and occlusive DVT of the left iliac and common femoral vein. He was sent home with daptomycin and Coumadin. He presented to the ER on 9/8/2021 after accidentally rolling over in bed and pulling his PICC line out. Patient reported that the PICC line got caught on his blanket and that it ripped. He believes that the PICC line is torn in half and that part of the catheter is retained in his right upper extremity. He complained of tenderness on palpation of the right upper extremity.     Vital signs on arrival were significant for heart rate of 141. Labs are significant for potassium 3.3, bicarbonate 19, creatinine 1.8, anion gap 18, INR 1.8 and WBC 16.3. Chest x-ray showed no acute findings and right upper extremity x-ray ordered showing no retained fragments of PICC line. He was given 1 L of normal saline and 40 mEq of potassium prior to being admitted.       Past Medical History:        Diagnosis Date    Accident 11/2019    stepped on nail rt ft about 1 month ago- healed per patient    Acute thrombosis of inferior vena cava ([address] 10/10/2020    SIMÓN (acute kidney injury) ([address] 2008 apx    kidney bruised due to fall / Soraida Duhamel    Allergic rhinitis     Blister of left leg 8/31/2021    Cellulitis of leg, left 8/31/2021    Chronic back pain     Depression     Dermatophytosis 8/31/2021    Diabetes mellitus ([address]     Difficulty sleeping     at times    Displacement of lumbar intervertebral disc without myelopathy     Fibromyalgia     Fractured rib     2008 / healed    H/O seasonal allergies     Head injury 1980'S apx    no residual s/s    History of deep vein thrombosis 8/31/2021    Hyperlipidemia     Hypertension     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    Lymphedema of left leg 8/31/2021    Obesity (BMI 35.0-39.9 without comorbidity)     bmi 39.2  weight 296 #    Osteoarthritis     Recurrent acute deep vein thrombosis (DVT) of left lower extremity ([address] 8/31/2021    S/P IVC filter 8/31/2021    Subtherapeutic international normalized ratio (INR) 8/31/2021    Thoracic or lumbosacral neuritis or radiculitis, unspecified        Past Surgical History:        Procedure Laterality Date    COLONOSCOPY N/A 9/5/2020    COLONOSCOPY WITH BIOPSY performed by Luis Alberto Rick MD at [address] PTC NEW ACCESS  8/3/2020    CT PTC NEW ACCESS 8/3/2020 [facility]    FOOT SURGERY Right 1985    to treat shattered bones    HERNIA REPAIR  2001    DOUBLE HERNIA    HERNIA REPAIR Right 12/9/2019    LAPAROSCOPIC RIGHT INGUINAL HERNIA REPAIR, MESH 10x15 cm PLACEMENT performed by Judy Gallegos MD at [facility] Left 4/11/2016    ILIAC ARTERY STENT INSERTION N/A 10/11/2020    S/P ILIAC STENT PLACEMENT VISUALIZATION performed by Emanuel Recinos MD at [facility] N/A 9/18/2020    LAPAROTOMY EXPLORATORY, CHOLECYSTECTOMY, BOWEL RESECTION, right darian colectomy, partial omentectomy, and splenectomy performed by Pastor Carl MD at [address] FL[address] DX W/COLLJ [address] PFRMD N/A 5/7/2018    COLONOSCOPY DIAGNOSTIC performed by Keli Catalan MD at [facility]    [facility]. [facility] ARTHROSCOPY Left 11 21 14    SHOULDER SURGERY  2001 &2007    RIGHT AND LEFT repair of tears    THROMBECTOMY / EMBOLECTOMY FEMORAL Left 1/1/2021    LEFT LOWER EXTREMITY, VENOGRAM, FOLLOW UP POSSIBLE REMOVAL LYSIS CATHETER performed by Emanuel Recinos MD at [address] / EMBOLECTOMY FEMORAL Left 1/2/2021    LEFT LOWER EXTREMITY VENOGRAM performed by Emanuel Recinos MD at [address] [facility] Right 10/31/2016    Total right hip  Adelaida Sky MD    UPPER GASTROINTESTINAL ENDOSCOPY N/A 9/4/2020    EGD DIAGNOSTIC ONLY performed by Deya Isaac MD at [address] [facility] Left 1/3/2021    LEFT LOWER EXTREMITY VENOGRAM, PLACEMENT OF NEW LYSIS CATHETER performed by Emanuel Recinos MD at [facility]  2007    LEFT WRIST       Medications Prior to Admission:      Prior to Admission medications    Medication Sig Start Date End Date Taking? Authorizing Provider   daptomycin (CUBICIN) infusion Infuse 611.4 mg intravenously every 24 hours for 14 days Compound per protocol.  9/3/21 9/17/21  IGOR Blackwell - CNP   warfarin (COUMADIN) 2.5 MG tablet Take 2.5 mg by mouth every evening Given with Warfarin 5 mg total dose Warfarin 7.5 mg daily smokeless tobacco use includes chew. ETOH:   reports previous alcohol use. Family History:    As follows:      Problem Relation Age of Onset    Hypertension Mother     Arthritis Father     Diabetes Father     Cancer Maternal Grandfather         Skin    Cancer Paternal Uncle         skin    Diabetes Paternal Grandfather     Heart Disease Maternal Grandmother     Stroke Maternal Aunt        REVIEW OF SYSTEMS:   Pertinent positives as noted in the HPI. All other systems reviewed and negative. PHYSICAL EXAM:  /71   Pulse 104   Temp 97.5 °F (36.4 °C)   Resp 16   Ht 6' 1\" (1.854 m)   Wt 290 lb (131.5 kg)   SpO2 96%   BMI 38.26 kg/m²   General appearance: No apparent distress, appears stated age and cooperative. HEENT: Normal cephalic, atraumatic without obvious deformity. Pupils equal, round, and reactive to light. Neck: Supple, with full range of motion. No jugular venous distention. Trachea midline. Respiratory:  Clear to auscultation bilaterally. No apparent distress on room air. Cardiovascular:  Regular rate and rhythm. S1, S2 without murmurs, rubs, or gallops. PV: Brisk capillary refill. +2 pedal and radial pulses bilaterally. No clubbing, cyanosis, edema of bilateral lower extremities. Abdomen: Soft, obese, non-tender, non-distended. +BS  Musculoskeletal: No obvious deformities or erythematous or edematous joints. Skin: Normal skin color. LLE erythema, abrasion. RUE bruising  Neurologic:  Neurovascularly intact without any focal sensory/motor deficits.   Psychiatric: Calm and cooperative    Reviewed EKG and CXR personally      CBC:   Recent Labs     09/07/21  1720 09/08/21  0432   WBC 11.2 16.3*   RBC 4.18 4.41   HGB 12.7 13.2   HCT 37.9 40.0   MCV 90.7 90.7   RDW 15.0 15.3*   * 640*     BMP:   Recent Labs     09/07/21  1720 09/08/21  0432    144   K 2.9* 3.3*    107   CO2 22 19*   BUN 5* 10   CREATININE 0.7 1.8*   MG  --  2.1     LFT:  Recent Labs 09/07/21  1720 09/08/21 0432   PROT 7.6 7.6   ALKPHOS 136* 138*   ALT 23 26   AST 24 26   BILITOT 0.3 0.2     CE:  Recent Labs     09/07/21 1720   CKTOTAL 110     PT/INR:   Recent Labs     09/08/21 0432   INR 1.8     BNP: No results for input(s): BNP in the last 72 hours. ESR:   Lab Results   Component Value Date    SEDRATE 10 09/07/2021     CRP:   Lab Results   Component Value Date    CRP 4.0 (H) 08/31/2021     D Dimer:   Lab Results   Component Value Date    DDIMER 578 08/06/2020      Folate and B12: No results found for: [id number], No results found for: FOLATE  Lactic Acid:   Lab Results   Component Value Date    LACTA 1.5 08/30/2021     Thyroid Studies:   Lab Results   Component Value Date    TSH 1.650 03/29/2021       Oupatient labs:  Lab Results   Component Value Date    CHOL 154 03/29/2021    TRIG 202 (H) 03/29/2021    HDL 38 03/29/2021    LDLCALC 76 03/29/2021    TSH 1.650 03/29/2021    INR 1.8 09/08/2021    LABA1C 6.5 03/29/2021       Urinalysis:    Lab Results   Component Value Date    NITRU Negative 04/23/2021    WBCUA NONE 04/23/2021    BACTERIA RARE 04/23/2021    RBCUA 0-1 04/23/2021    BLOODU Negative 04/23/2021    SPECGRAV >=1.030 04/23/2021    GLUCOSEU Negative 04/23/2021       Imaging:  XR CHEST (2 VW)    Result Date: 8/30/2021  No evidence of active cardiopulmonary pathology. XR HUMERUS RIGHT (MIN 2 VIEWS)    Result Date: 9/8/2021  No radiopaque foreign body or acute bony abnormality. XR HIP LEFT (2-3 VIEWS)    Result Date: 9/3/2021  Postsurgical changes to the left hip. No complicating features. No acute osseous findings. NM BONE SCAN 3 PHASE    Result Date: 9/4/2021  Findings compatible with degenerative changes No convincing evidence for osteomyelitis of the left or right hip.  Mild tracer uptake on the left at the level of the greater trochanter a nonspecific finding    XR CHEST PORTABLE    Result Date: 9/8/2021  No radiographic evidence of acute abnormality     CTA PULMONARY W CONTRAST    Result Date: 8/31/2021  No evidence of pulmonary embolism or acute pulmonary abnormality. US DUP LOWER EXTREMITY LEFT JORGE    Result Date: 8/30/2021  There is evidence for deep venous thrombosis, when compared to previous there was nonocclusive thrombus seen in this region of the left iliac vein and the common femoral vein. The thrombus is now occlusive and appears worse. ALERT:  THIS IS AN ABNORMAL REPORT         ASSESSMENT/PLAN:    1. SIMÓN stage II-likely volume responsive prerenal SIMÓN. He denies decreased PO intake but does endorse chronic diarrhea and admits to taking eight ibuprofen tablets daily for the last several days. Given 1 L of normal saline in ED. Repeat BMP and obtain urine indices this afternoon. Consult nephrology if no improvement in renal function this afternoon. 2. Staphylococcus warneri bacteremia-culture 8/31/2021, following with ID, on daptomycin x6 weeks. PICC line pulled out on 9/8, replaced in ED this morning. He states that his LLE erythema has not improved since discharge. Consult ID. 3. LLE cellulitis- treatment as above    4. RUE pain- at the site of previous PICC line insertion- concern for DVT. US ordred. 5. Hypokalemia- s/p supplementation, continue to monitor K levels. 6. Low bicarbonate level with elevated anion gap- likely 2/2 uremia, rule out lactic acidosis. 7. Occlusive DVT of the left iliac and common femoral veins-continue Coumadin, INR 1.8 on admission. LLE remains quite edematous. 8. Insulin-dependent diabetes-resume sliding scale insulin and Lantus    9. Hypertension-hold lisinopril in view of SIMÓN, resume metoprolol    10. Hyperlipidemia-continue statin    11.  Chronic depression-continue Cymbalta      Diet: No diet orders on file  Code Status: Prior  Surrogate decision maker confirmed with patient:   Extended Emergency Contact Information  Primary Emergency Contact: Hiralyevgeniy Sreekanth, [address] Phone: [phone]  Mobile Phone: [phone]  Relation: Parent  Preferred language: English   needed? No  Secondary Emergency Contact: Tsering [facility] [address]  Phone: [phone]  Mobile Phone: [phone]  Relation: Child  Preferred language: English   needed? No    DVT Prophylaxis: []Lovenox []Heparin []PCD [x] [address] []Encouraged ambulation  Disposition: []Med/Surg [x] Intermediate [] ICU/CCU  Admit status: [] Observation [x] Inpatient     +++++++++++++++++++++++++++++++++++++++++++++++++  [facility]  +++++++++++++++++++++++++++++++++++++++++++++++++  NOTE: This report was transcribed using voice recognition software. Every effort was made to ensure accuracy; however, inadvertent computerized transcription errors may be present.

## 2021-09-08 NOTE — ED NOTES
IV team at bedside for PICC line placement      Black Fagan, [facility], [facility]  09/08/21 2453

## 2021-09-08 NOTE — CONSULTS
[address] [place] [address] [facility]  MADY  Progress Note      Pt is known to me . This is a 50 yrs old male being treated with IV daptomycin for Staph warnerii bacteremia, left leg cellulitis , ? Septic arthritis -presented to the ER as the PICC line was pulled out this AM . New PICC line was placed in the left arm .      Pt reports left leg swelling, pain   Denies fever and chills     Current Facility-Administered Medications   Medication Dose Route Frequency Provider Last Rate Last Admin    daptomycin (CUBICIN) 611.4 mg  611.4 mg IntraVENous Q24H IGOR Montoya CNP        DULoxetine (CYMBALTA) extended release capsule 30 mg  30 mg Oral Daily IGOR Montoya CNP        fenofibrate (TRICOR) tablet 54 mg  54 mg Oral Daily IGOR Montoya CNP        [START ON 9/9/2021] ferrous sulfate (IRON 325) tablet 325 mg  325 mg Oral Daily with breakfast IGOR Montoya CNP        hydroxyurea (HYDREA) chemo capsule 500 mg  500 mg Oral BID GIOR Montoya CNP        insulin glargine (LANTUS) injection vial 25 Units  25 Units SubCUTAneous Nightly IGOR Montoya CNP        insulin lispro (HUMALOG) injection vial 0-12 Units  0-12 Units SubCUTAneous TID WC IGOR Montoya CNP        insulin lispro (HUMALOG) injection vial 0-6 Units  0-6 Units SubCUTAneous Nightly IGOR Montoya CNP        glucose (GLUTOSE) 40 % oral gel 15 g  15 g Oral PRN IGOR Montoya CNP        dextrose 50 % IV solution  12.5 g IntraVENous PRN IGOR Montoya CNP        glucagon (rDNA) injection 1 mg  1 mg IntraMUSCular PRN IGOR Montoya CNP        dextrose 5 % solution  100 mL/hr IntraVENous PRN IGOR Montoya CNP        loperamide (IMODIUM) capsule 2 mg  2 mg Oral 4x Daily PRN IGOR Montoya CNP        melatonin tablet 3 mg  3 mg Oral Nightly PRN IGOR Montoya CNP        metoprolol succinate (TOPROL XL) extended release tablet 25 mg  25 mg Oral Daily Sam Pilsner, APRN - CNP        pantoprazole (PROTONIX) tablet 40 mg  40 mg Oral Daily Sam Pilsner, APRN - CNP        pravastatin (PRAVACHOL) tablet 20 mg  20 mg Oral Nightly Sam Pilsner, APRN - CNP        warfarin (COUMADIN) tablet 7.5 mg  7.5 mg Oral QPM Sam Pilsner, APRN - CNP        sodium chloride flush 0.9 % injection 5-40 mL  5-40 mL IntraVENous 2 times per day Sam Pilsner, APRN - CNP        sodium chloride flush 0.9 % injection 5-40 mL  5-40 mL IntraVENous PRN Sam Pilsner, APRN - CNP        0.9 % sodium chloride infusion  25 mL IntraVENous PRN Sam Pilsner, APRN - CNP        ondansetron (ZOFRAN-ODT) disintegrating tablet 4 mg  4 mg Oral Q8H PRN Sam Pilsner, APRN - CNP        Or    ondansetron (ZOFRAN) injection 4 mg  4 mg IntraVENous Q6H PRN Sam Pilsner, APRN - CNP        polyethylene glycol (GLYCOLAX) packet 17 g  17 g Oral Daily PRN Sam Pilsner, APRN - CNP        acetaminophen (TYLENOL) tablet 650 mg  650 mg Oral Q6H PRN Sam Pilsner, APRN - CNP        Or    acetaminophen (TYLENOL) suppository 650 mg  650 mg Rectal Q6H PRN Sam Pilsner, APRN - CNP        lactated ringers infusion   IntraVENous Continuous [facility], APRN -  mL/hr at 09/08/21 1513 New Bag at 09/08/21 1513    oxyCODONE-acetaminophen (PERCOCET) 5-325 MG per tablet 1 tablet  1 tablet Oral Q4H PRN Sam Pilsner, APRN - CNP        Or    oxyCODONE-acetaminophen (PERCOCET) 5-325 MG per tablet 2 tablet  2 tablet Oral Q4H PRN Sam Pilsner, APRN - CNP         Current Outpatient Medications   Medication Sig Dispense Refill    daptomycin (CUBICIN) infusion Infuse 611.4 mg intravenously every 24 hours for 14 days Compound per protocol.  8.5 g 0    warfarin (COUMADIN) 2.5 MG tablet Take 2.5 mg by mouth every evening Given with Warfarin 5 mg total dose Warfarin 7.5 mg daily      warfarin (COUMADIN) 5 MG tablet appreciated. Abdomen: Soft, non tender   Extremities: Left leg +2 pitting edema LLE    Lines: Left arm PICC line       Laboratory and Tests Review:  Lab Results   Component Value Date    WBC 16.3 (H) 09/08/2021    WBC 11.2 09/07/2021    WBC 8.8 09/03/2021    HGB 13.2 09/08/2021    HCT 40.0 09/08/2021    MCV 90.7 09/08/2021     (H) 09/08/2021     Lab Results   Component Value Date    NEUTROABS 12.87 (H) 09/08/2021    NEUTROABS 7.22 09/07/2021    NEUTROABS 4.46 09/03/2021     No results found for: [facility]  Lab Results   Component Value Date    ALT 26 09/08/2021    AST 26 09/08/2021    ALKPHOS 138 (H) 09/08/2021    BILITOT 0.2 09/08/2021     Lab Results   Component Value Date     09/08/2021    K 3.3 09/08/2021     09/08/2021    CO2 19 09/08/2021    BUN 10 09/08/2021    CREATININE 1.8 09/08/2021    CREATININE 0.7 09/07/2021    CREATININE 0.8 09/03/2021    GFRAA 49 09/08/2021    LABGLOM 40 09/08/2021    GLUCOSE 135 09/08/2021    GLUCOSE 125 05/11/2012    PROT 7.6 09/08/2021    LABALBU 4.4 09/08/2021    LABALBU 4.8 05/11/2012    CALCIUM 9.4 09/08/2021    BILITOT 0.2 09/08/2021    ALKPHOS 138 09/08/2021    AST 26 09/08/2021    ALT 26 09/08/2021     Lab Results   Component Value Date    CRP 4.0 (H) 08/31/2021    CRP 16.9 (H) 10/10/2020    CRP 13.4 (H) 09/24/2020     Lab Results   Component Value Date    SEDRATE 10 09/07/2021    SEDRATE 50 (H) 08/31/2021    SEDRATE 95 (H) 09/24/2020     Radiology:  rerviewed    Microbiology:     Susceptibility    Staphylococcus warneri (1)    Antibiotic Interpretation CARLA Status    clindamycin Sensitive ^0. 25 mcg/mL     DAPTOmycin Sensitive ^0. 25 mcg/mL     doxycycline Sensitive <=^0.5 mcg/mL     erythromycin Resistant >=^8 mcg/mL     gentamicin Sensitive <=^0.5 mcg/mL     oxacillin Sensitive <=^0.25 mcg/mL     trimethoprim-sulfamethoxazole Sensitive <=^10 mcg/mL     vancomycin Sensitive <=^0.5 mcg/mL     Lab and Collection    Culture, Blood 2 - 8/31/2021  CK total 110    ASSESSMENT:  · Staphylococcus warneri bacteremia - methicillin sensitive on cx however resistance on PCR  · Lymphangitis left lower extremity improving   · DVT left leg  · Left hip pain - improved   · SIMÓN      PLAN:      · Daptomycin 6 mg / kg IV q 24 hr  · Urine eosinophil    · Monitor labs /Cr   · Will follow with you    Agusto Zuluaga MD  3:19 PM  9/8/2021

## 2021-09-08 NOTE — ED NOTES
Pt A&O x4, resp clear and easy, skin warm and dry. Pt presents for PICC line being accidentally pulled out of right upper arm. Pt presented with PICC line in hand. Pt has small elevated area to right arm, c/o tenderness to area.   Pt denies chest pain or SOB     [facility]  09/08/21 2417

## 2021-09-08 NOTE — ED PROVIDER NOTES
He was being treated for cellulitis as well as DVT. Patient was discharged from the hospital on daptomycin, Coumadin    The history is provided by the patient. No  was used. Review of Systems   Constitutional: Negative for chills and fever. HENT: Negative for ear pain, sinus pressure and sore throat. Eyes: Negative for pain, discharge and redness. Respiratory: Negative for cough, shortness of breath and wheezing. Cardiovascular: Negative for chest pain. Gastrointestinal: Negative for abdominal pain, diarrhea, nausea and vomiting. Genitourinary: Negative for dysuria and frequency. Musculoskeletal: Negative for arthralgias and back pain. Skin: Negative for rash and wound. Neurological: Negative for weakness and headaches. Hematological: Negative for adenopathy. Psychiatric/Behavioral: Negative for behavioral problems and confusion. All other systems reviewed and are negative. Physical Exam  Vitals and nursing note reviewed. Constitutional:       Appearance: Normal appearance. He is well-developed. HENT:      Head: Normocephalic and atraumatic. Eyes:      Pupils: Pupils are equal, round, and reactive to light. Cardiovascular:      Rate and Rhythm: Normal rate and regular rhythm. Pulses: Normal pulses. Heart sounds: Normal heart sounds. No murmur heard. Pulmonary:      Effort: Pulmonary effort is normal. No respiratory distress. Breath sounds: Normal breath sounds. No wheezing or rales. Abdominal:      General: Bowel sounds are normal.      Palpations: Abdomen is soft. Tenderness: There is no abdominal tenderness. There is no guarding or rebound. Musculoskeletal:         General: Tenderness present. Cervical back: Normal range of motion and neck supple. No rigidity or tenderness. Right lower leg: No edema. Left lower leg: No edema.       Comments: Angelito palpation of the right upper extremity over the bicep area where patient did have PICC line removed   Skin:     General: Skin is warm and dry. Capillary Refill: Capillary refill takes less than 2 seconds. Neurological:      General: No focal deficit present. Mental Status: He is alert and oriented to person, place, and time. Cranial Nerves: No cranial nerve deficit. Sensory: No sensory deficit. Coordination: Coordination normal.          Procedures     Labs Reviewed   CBC WITH AUTO DIFFERENTIAL - Abnormal; Notable for the following components:       Result Value    WBC 16.3 (*)     RDW 15.3 (*)     Platelets 783 (*)     Lymphocytes % 10.6 (*)     Neutrophils Absolute 12.87 (*)     Monocytes Absolute 1.42 (*)     All other components within normal limits   COMPREHENSIVE METABOLIC PANEL W/ REFLEX TO MG FOR LOW K - Abnormal; Notable for the following components:    Potassium reflex Magnesium 3.3 (*)     CO2 19 (*)     Anion Gap 18 (*)     Glucose 135 (*)     CREATININE 1.8 (*)     Alkaline Phosphatase 138 (*)     All other components within normal limits   PROTIME-INR - Abnormal; Notable for the following components:    Protime 19.9 (*)     All other components within normal limits   MAGNESIUM     XR HUMERUS RIGHT (MIN 2 VIEWS)   Final Result   No radiopaque foreign body or acute bony abnormality. XR CHEST PORTABLE    (Results Pending)         MDM  Number of Diagnoses or Management Options  SIMÓN (acute kidney injury) ([facility] [address]  Diagnosis management comments: Patient patient is a 24-year-old male presents today after accidentally pulling out his PICC line in the right upper extremity. He is neurovascular intact in the right upper extremity. Labs and imaging obtained. Lab work pertinent for SIMÓN with a creatinine 1.8. Remainder of labs within normal limits with exception of mildly elevated WBC of 16.3. He has possibly daptomycin for lower extremity cellulitis.   There was concern that patient possibly had retained partial PICC line as the PICC line that he brought with him is missing the distal portion. X-ray of the right upper extremity as well as chest x-ray shows no evidence of retained PICC line. Patient was given IV fluids. He will be admitted to the hospital for new placement of PICC line for IV antibiotics as well as fluids for SIMÓN. Amount and/or Complexity of Data Reviewed  Clinical lab tests: reviewed  Tests in the radiology section of CPT®: reviewed  Decide to obtain previous medical records or to obtain history from someone other than the patient: yes              --------------------------------------------- PAST HISTORY ---------------------------------------------  Past Medical History:  has a past medical history of Accident, Acute thrombosis of inferior vena cava ([address] SIMÓN (acute kidney injury) ([address] Allergic rhinitis, Blister of left leg, Cellulitis of leg, left, Chronic back pain, Depression, Dermatophytosis, Diabetes mellitus ([address] Difficulty sleeping, Displacement of lumbar intervertebral disc without myelopathy, Fibromyalgia, Fractured rib, H/O seasonal allergies, Head injury, History of deep vein thrombosis, Hyperlipidemia, Hypertension, 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[address] Lymphedema of left leg, Obesity (BMI 35.0-39.9 without comorbidity), Osteoarthritis, Recurrent acute deep vein thrombosis (DVT) of left lower extremity ([address] S/P IVC filter, Subtherapeutic international normalized ratio (INR), and Thoracic or lumbosacral neuritis or radiculitis, unspecified. Past Surgical History:  has a past surgical history that includes Neck surgery (2000); shoulder surgery (2001 &2007); Wrist surgery (2007); Rotator cuff repair (Left, 2014); hernia repair (2001); Shoulder arthroscopy (Left, 11 21 14); Vasectomy; Hip Arthroplasty (Left, 4/11/2016); Total hip arthroplasty (Right, 10/31/2016);  Foot surgery (Right, 1985); pr colonoscopy flx dx w/collj spec when pfrmd (N/A, 5/7/2018); hernia repair (Right, 12/9/2019); CT PTC NEW ACCESS (8/3/2020); Upper gastrointestinal endoscopy (N/A, 9/4/2020); Colonoscopy (N/A, 9/5/2020); laparotomy (N/A, 9/18/2020); iliac artery stent insertion (N/A, 10/11/2020); THROMBECTOMY / EMBOLECTOMY FEMORAL (Left, 1/1/2021); THROMBECTOMY / EMBOLECTOMY FEMORAL (Left, 1/2/2021); and Vena Cava Filter Placement (Left, 1/3/2021). Social History:  reports that he has never smoked. His smokeless tobacco use includes chew. He reports previous alcohol use. He reports that he does not use drugs. Family History: family history includes Arthritis in his father; Cancer in his maternal grandfather and paternal uncle; Diabetes in his father and paternal grandfather; Heart Disease in his maternal grandmother; Hypertension in his mother; Stroke in his maternal aunt. The patients home medications have been reviewed.     Allergies: Seasonal and Tramadol    -------------------------------------------------- RESULTS -------------------------------------------------    LABS:  Results for orders placed or performed during the hospital encounter of 09/08/21   CBC Auto Differential   Result Value Ref Range    WBC 16.3 (H) 4.5 - 11.5 E9/L    RBC 4.41 3.80 - 5.80 E12/L    Hemoglobin 13.2 12.5 - 16.5 g/dL    Hematocrit 40.0 37.0 - 54.0 %    MCV 90.7 80.0 - 99.9 fL    MCH 29.9 26.0 - 35.0 pg    MCHC 33.0 32.0 - 34.5 %    RDW 15.3 (H) 11.5 - 15.0 fL    Platelets 711 (H) 349 - 450 E9/L    MPV 9.1 7.0 - 12.0 fL    Neutrophils % 79.0 43.0 - 80.0 %    Immature Granulocytes % 0.7 0.0 - 5.0 %    Lymphocytes % 10.6 (L) 20.0 - 42.0 %    Monocytes % 8.7 2.0 - 12.0 %    Eosinophils % 0.4 0.0 - 6.0 %    Basophils % 0.6 0.0 - 2.0 %    Neutrophils Absolute 12.87 (H) 1.80 - 7.30 E9/L    Immature Granulocytes # 0.11 E9/L    Lymphocytes Absolute 1.73 1.50 - 4.00 E9/L    Monocytes Absolute 1.42 (H) 0.10 - 0.95 E9/L    Eosinophils Absolute 0.06 0.05 - 0.50 E9/L    Basophils Absolute 0.10 0.00 - 0.20 E9/L   Comprehensive Metabolic Panel w/ Reflex to MG   Result Value Ref Range    Sodium 144 132 - 146 mmol/L    Potassium reflex Magnesium 3.3 (L) 3.5 - 5.0 mmol/L    Chloride 107 98 - 107 mmol/L    CO2 19 (L) 22 - 29 mmol/L    Anion Gap 18 (H) 7 - 16 mmol/L    Glucose 135 (H) 74 - 99 mg/dL    BUN 10 6 - 20 mg/dL    CREATININE 1.8 (H) 0.7 - 1.2 mg/dL    GFR Non-African American 40 >=60 mL/min/1.73    GFR African American 49     Calcium 9.4 8.6 - 10.2 mg/dL    Total Protein 7.6 6.4 - 8.3 g/dL    Albumin 4.4 3.5 - 5.2 g/dL    Total Bilirubin 0.2 0.0 - 1.2 mg/dL    Alkaline Phosphatase 138 (H) 40 - 129 U/L    ALT 26 0 - 40 U/L    AST 26 0 - 39 U/L   Protime-INR   Result Value Ref Range    Protime 19.9 (H) 9.3 - 12.4 sec    INR 1.8    Magnesium   Result Value Ref Range    Magnesium 2.1 1.6 - 2.6 mg/dL       RADIOLOGY:  XR HUMERUS RIGHT (MIN 2 VIEWS)   Final Result   No radiopaque foreign body or acute bony abnormality. XR CHEST PORTABLE    (Results Pending)           ------------------------- NURSING NOTES AND VITALS REVIEWED ---------------------------  Date / Time Roomed:  9/8/2021  3:20 AM  ED Bed Assignment:  17B/17B-17    The nursing notes within the ED encounter and vital signs as below have been reviewed. Patient Vitals for the past 24 hrs:   BP Temp Pulse Resp SpO2 Height Weight   09/08/21 0315 105/71 97.5 °F (36.4 °C) 104 16 96 % 6' 1\" (1.854 m) 290 lb (131.5 kg)   09/08/21 0306 -- -- 141 -- 93 % -- --       Oxygen Saturation Interpretation: Normal    ------------------------------------------ PROGRESS NOTES ------------------------------------------    Counseling:  I have spoken with the patient and discussed todays results, in addition to providing specific details for the plan of care and counseling regarding the diagnosis and prognosis.   Their questions are answered at this time and they are agreeable with the plan of admission.    --------------------------------- ADDITIONAL PROVIDER NOTES ---------------------------------  Consultations:  Spoke with [facility]. Discussed case. They will admit the patient. This patient's ED course included: a personal history and physicial examination    This patient has remained hemodynamically stable during their ED course. Medications   potassium chloride (KLOR-CON M) extended release tablet 40 mEq (has no administration in time range)   0.9 % sodium chloride bolus (has no administration in time range)         Diagnosis:  1. SIMÓN (acute kidney injury) ([facility] [address]        Disposition:  Patient's disposition: Admit to telemetry  Patient's condition is stable.           Cici Harrison,   Resident  09/08/21 [address], DO  09/08/21 3361

## 2021-09-08 NOTE — HOME CARE
Patient is active with [facility] for SN. Patient will need resumption of care orders at discharge.      Thank you, [facility]

## 2021-09-08 NOTE — CARE COORDINATION
CRISTOPHER transition of care. Pt presented to [facility] ER secondary to PICC line coming out. Pt recently d/c from facility on 9/5/21. Met with pt at bedside to discuss d/c planning. New PICC line placed today. Pt lives with his son in 2 story home with 2 steps to enter. Bed/Bath on 2nd floor with 14 steps. Pt has DME walker and cane. Has hx with [facility] SNF. Currently active with [facility] for Nrsg and IV daptomycin. Will need CATHI orders at d/c if no changes. Pt PCP is Rayna Hunt, APR- CNP. Pharmacy is [facility] in [place]. Pt son will transport to home day of d/c after 6pm.  Pt d/c plan is to return to home with [facility]. Assigned CM/SW to follow for final d/c plan.   Antonella Rubio RN CM

## 2021-09-08 NOTE — ED NOTES
Pt called RN over to him and pointed to abd area, stated \"I want this checked out also, pointing to possible hernia. \"  RN informed pt to show physician area      Josemanuel Samaniego RN  09/08/21 5004

## 2021-09-08 NOTE — ED NOTES
Bed: 17B-17  Expected date:   Expected time:   Means of arrival:   Comments:  Amparo Dominguez RN  09/08/21 0529

## 2021-09-08 NOTE — PROGRESS NOTES
Power picc line  Placement 9/8/2021    Product number: [id number]   Lot Number: [id number]      Ultrasound: yes   Left Basilic vein:                Upper Arm Circumference: 35cm    Size: 5.5frdl    Exposed Length: 1cm    Internal Length: 46cm   Cut: 8cm   Vein Measurement: 0.64cm    Jessika Sweeney RN  9/8/2021  8:50 AM          Power picc line placed with vps tip conformation by Juan Francisco .  Tip in lower 1/3 svc/caj

## 2021-09-09 LAB
ANION GAP SERPL CALCULATED.3IONS-SCNC: 10 MMOL/L (ref 7–16)
APTT: 182 SEC (ref 24.5–35.1)
APTT: 45.2 SEC (ref 24.5–35.1)
APTT: 66.2 SEC (ref 24.5–35.1)
BASOPHILS ABSOLUTE: 0.07 E9/L (ref 0–0.2)
BASOPHILS RELATIVE PERCENT: 0.9 % (ref 0–2)
BUN BLDV-MCNC: 11 MG/DL (ref 6–20)
CALCIUM SERPL-MCNC: 7.9 MG/DL (ref 8.6–10.2)
CHLORIDE BLD-SCNC: 106 MMOL/L (ref 98–107)
CO2: 23 MMOL/L (ref 22–29)
CREAT SERPL-MCNC: 0.9 MG/DL (ref 0.7–1.2)
EOSINOPHILS ABSOLUTE: 0.42 E9/L (ref 0.05–0.5)
EOSINOPHILS RELATIVE PERCENT: 5.3 % (ref 0–6)
GFR AFRICAN AMERICAN: >60
GFR NON-AFRICAN AMERICAN: >60 ML/MIN/1.73
GLUCOSE BLD-MCNC: 126 MG/DL (ref 74–99)
HCT VFR BLD CALC: 29.3 % (ref 37–54)
HEMOGLOBIN: 9.9 G/DL (ref 12.5–16.5)
IMMATURE GRANULOCYTES #: 0.04 E9/L
IMMATURE GRANULOCYTES %: 0.5 % (ref 0–5)
INR BLD: 2.6
LYMPHOCYTES ABSOLUTE: 2.15 E9/L (ref 1.5–4)
LYMPHOCYTES RELATIVE PERCENT: 27.1 % (ref 20–42)
MAGNESIUM: 1.6 MG/DL (ref 1.6–2.6)
MCH RBC QN AUTO: 30.9 PG (ref 26–35)
MCHC RBC AUTO-ENTMCNC: 33.8 % (ref 32–34.5)
MCV RBC AUTO: 91.6 FL (ref 80–99.9)
METER GLUCOSE: 102 MG/DL (ref 74–99)
METER GLUCOSE: 110 MG/DL (ref 74–99)
METER GLUCOSE: 83 MG/DL (ref 74–99)
METER GLUCOSE: 94 MG/DL (ref 74–99)
MONOCYTES ABSOLUTE: 0.83 E9/L (ref 0.1–0.95)
MONOCYTES RELATIVE PERCENT: 10.5 % (ref 2–12)
NEUTROPHILS ABSOLUTE: 4.41 E9/L (ref 1.8–7.3)
NEUTROPHILS RELATIVE PERCENT: 55.7 % (ref 43–80)
PDW BLD-RTO: 15.7 FL (ref 11.5–15)
PLATELET # BLD: 492 E9/L (ref 130–450)
PMV BLD AUTO: 9.1 FL (ref 7–12)
POTASSIUM REFLEX MAGNESIUM: 3.4 MMOL/L (ref 3.5–5)
PROTHROMBIN TIME: 28.6 SEC (ref 9.3–12.4)
RBC # BLD: 3.2 E12/L (ref 3.8–5.8)
SODIUM BLD-SCNC: 139 MMOL/L (ref 132–146)
WBC # BLD: 7.9 E9/L (ref 4.5–11.5)

## 2021-09-09 PROCEDURE — 82962 GLUCOSE BLOOD TEST: CPT

## 2021-09-09 PROCEDURE — 2060000000 HC ICU INTERMEDIATE R&B

## 2021-09-09 PROCEDURE — 85610 PROTHROMBIN TIME: CPT

## 2021-09-09 PROCEDURE — 6370000000 HC RX 637 (ALT 250 FOR IP): Performed by: NURSE PRACTITIONER

## 2021-09-09 PROCEDURE — 85025 COMPLETE CBC W/AUTO DIFF WBC: CPT

## 2021-09-09 PROCEDURE — 2580000003 HC RX 258: Performed by: NURSE PRACTITIONER

## 2021-09-09 PROCEDURE — 36415 COLL VENOUS BLD VENIPUNCTURE: CPT

## 2021-09-09 PROCEDURE — 85730 THROMBOPLASTIN TIME PARTIAL: CPT

## 2021-09-09 PROCEDURE — 6360000002 HC RX W HCPCS: Performed by: NURSE PRACTITIONER

## 2021-09-09 PROCEDURE — 83735 ASSAY OF MAGNESIUM: CPT

## 2021-09-09 PROCEDURE — 6360000002 HC RX W HCPCS: Performed by: FAMILY MEDICINE

## 2021-09-09 PROCEDURE — 80048 BASIC METABOLIC PNL TOTAL CA: CPT

## 2021-09-09 RX ADMIN — PANTOPRAZOLE SODIUM 40 MG: 40 TABLET, DELAYED RELEASE ORAL at 08:37

## 2021-09-09 RX ADMIN — OXYCODONE AND ACETAMINOPHEN 2 TABLET: 5; 325 TABLET ORAL at 14:37

## 2021-09-09 RX ADMIN — WARFARIN SODIUM 7.5 MG: 7.5 TABLET ORAL at 18:24

## 2021-09-09 RX ADMIN — HYDROXYUREA 500 MG: 500 CAPSULE ORAL at 21:49

## 2021-09-09 RX ADMIN — SODIUM CHLORIDE, POTASSIUM CHLORIDE, SODIUM LACTATE AND CALCIUM CHLORIDE: 600; 310; 30; 20 INJECTION, SOLUTION INTRAVENOUS at 10:38

## 2021-09-09 RX ADMIN — INSULIN GLARGINE 25 UNITS: 100 INJECTION, SOLUTION SUBCUTANEOUS at 21:49

## 2021-09-09 RX ADMIN — FERROUS SULFATE TAB 325 MG (65 MG ELEMENTAL FE) 325 MG: 325 (65 FE) TAB at 08:37

## 2021-09-09 RX ADMIN — SODIUM CHLORIDE, PRESERVATIVE FREE 10 ML: 5 INJECTION INTRAVENOUS at 08:39

## 2021-09-09 RX ADMIN — DAPTOMYCIN 600 MG: 500 INJECTION, POWDER, LYOPHILIZED, FOR SOLUTION INTRAVENOUS at 18:24

## 2021-09-09 RX ADMIN — DULOXETINE HYDROCHLORIDE 30 MG: 30 CAPSULE, DELAYED RELEASE ORAL at 08:37

## 2021-09-09 RX ADMIN — HYDROXYUREA 500 MG: 500 CAPSULE ORAL at 08:37

## 2021-09-09 RX ADMIN — OXYCODONE AND ACETAMINOPHEN 2 TABLET: 5; 325 TABLET ORAL at 06:29

## 2021-09-09 RX ADMIN — SODIUM CHLORIDE, PRESERVATIVE FREE 10 ML: 5 INJECTION INTRAVENOUS at 21:49

## 2021-09-09 RX ADMIN — OXYCODONE AND ACETAMINOPHEN 2 TABLET: 5; 325 TABLET ORAL at 02:09

## 2021-09-09 RX ADMIN — METOPROLOL SUCCINATE 25 MG: 25 TABLET, EXTENDED RELEASE ORAL at 08:37

## 2021-09-09 RX ADMIN — FENOFIBRATE 54 MG: 54 TABLET ORAL at 08:37

## 2021-09-09 RX ADMIN — HEPARIN SODIUM 10000 UNITS: 1000 INJECTION INTRAVENOUS; SUBCUTANEOUS at 09:42

## 2021-09-09 RX ADMIN — OXYCODONE AND ACETAMINOPHEN 2 TABLET: 5; 325 TABLET ORAL at 10:38

## 2021-09-09 RX ADMIN — HEPARIN SODIUM 5000 UNITS: 1000 INJECTION INTRAVENOUS; SUBCUTANEOUS at 02:12

## 2021-09-09 RX ADMIN — HEPARIN SODIUM 18 UNITS/KG/HR: 10000 INJECTION, SOLUTION INTRAVENOUS at 02:10

## 2021-09-09 RX ADMIN — PRAVASTATIN SODIUM 20 MG: 20 TABLET ORAL at 21:49

## 2021-09-09 RX ADMIN — SODIUM CHLORIDE, POTASSIUM CHLORIDE, SODIUM LACTATE AND CALCIUM CHLORIDE: 600; 310; 30; 20 INJECTION, SOLUTION INTRAVENOUS at 00:24

## 2021-09-09 RX ADMIN — OXYCODONE AND ACETAMINOPHEN 2 TABLET: 5; 325 TABLET ORAL at 18:38

## 2021-09-09 RX ADMIN — OXYCODONE AND ACETAMINOPHEN 2 TABLET: 5; 325 TABLET ORAL at 23:30

## 2021-09-09 ASSESSMENT — PAIN DESCRIPTION - FREQUENCY: FREQUENCY: CONTINUOUS

## 2021-09-09 ASSESSMENT — PAIN SCALES - GENERAL
PAINLEVEL_OUTOF10: 7
PAINLEVEL_OUTOF10: 7
PAINLEVEL_OUTOF10: 6
PAINLEVEL_OUTOF10: 7
PAINLEVEL_OUTOF10: 8
PAINLEVEL_OUTOF10: 7
PAINLEVEL_OUTOF10: 5
PAINLEVEL_OUTOF10: 7
PAINLEVEL_OUTOF10: 5
PAINLEVEL_OUTOF10: 4
PAINLEVEL_OUTOF10: 0

## 2021-09-09 ASSESSMENT — PAIN DESCRIPTION - ONSET: ONSET: ON-GOING

## 2021-09-09 ASSESSMENT — PAIN DESCRIPTION - LOCATION: LOCATION: GENERALIZED

## 2021-09-09 ASSESSMENT — PAIN DESCRIPTION - PROGRESSION
CLINICAL_PROGRESSION: NOT CHANGED
CLINICAL_PROGRESSION: NOT CHANGED

## 2021-09-09 ASSESSMENT — PAIN DESCRIPTION - PAIN TYPE: TYPE: ACUTE PAIN

## 2021-09-09 ASSESSMENT — PAIN DESCRIPTION - DESCRIPTORS: DESCRIPTORS: ACHING;DISCOMFORT

## 2021-09-09 NOTE — PROGRESS NOTES
Hospitalist Progress Note      SYNOPSIS: Patient admitted on 2021 for <principal problem not specified>      SUBJECTIVE:    Patient seen and examined he denies any chest pain or shortness of breath. Records reviewed. Stable overnight. No other overnight issues reported. Temp (24hrs), Av.9 °F (36.6 °C), Min:97.8 °F (36.6 °C), Max:98.1 °F (36.7 °C)    DIET: ADULT DIET; Regular; 4 carb choices (60 gm/meal); Low Sodium (2 gm)  CODE: Full Code    Intake/Output Summary (Last 24 hours) at 2021 8274  Last data filed at 2021 1430  Gross per 24 hour   Intake 1554.81 ml   Output --   Net 1554.81 ml       OBJECTIVE:    /78   Pulse 70   Temp 98.1 °F (36.7 °C) (Oral)   Resp 18   Ht 6' 1\" (1.854 m)   Wt 290 lb (131.5 kg)   SpO2 98%   BMI 38.26 kg/m²     General appearance: No apparent distress, appears stated age and cooperative. HEENT: Normal cephalic, atraumatic without obvious deformity. Pupils equal, round, and reactive to light. Neck: Supple, with full range of motion. No jugular venous distention. Trachea midline. Respiratory:  Clear to auscultation bilaterally. No apparent distress on room air. Cardiovascular:  Regular rate and rhythm. S1, S2 without murmurs, rubs, or gallops. PV: Brisk capillary refill. +2 pedal and radial pulses bilaterally. No clubbing, cyanosis, edema of bilateral lower extremities. Abdomen: Soft, obese, non-tender, non-distended. +BS  Musculoskeletal: No obvious deformities or erythematous or edematous joints. Skin: Normal skin color. LLE erythema, abrasion. RUE bruising  Neurologic:  Neurovascularly intact without any focal sensory/motor deficits. Psychiatric: Calm and cooperative       ASSESSMENT  #SIMÓN   -Resolved  -likely volume responsive prerenal SIMÓN.     #Staphylococcus warneri bacteremia  -culture 2021, following with ID, on daptomycin x6 weeks. PICC line pulled out on , replaced in ED this morning.  He states that his LLE erythema has not improved since discharge. -ID following    #LLE cellulitis- treatment as above     #RUE pain  - at the site of previous PICC line insertion Doppler was positive for DVT  -PICC line removed    #Recurrent DVT  -Consult hematology    #History of thrombocythemia  -Resume hydroxyurea     #Hypokalemia  -Replaced       #Occlusive DVT of the left iliac and common femoral veins  -continue Coumadin, INR therapeutic today.     #Insulin-dependent diabetes-resume sliding scale insulin and Lantus    #Hypertension-hold lisinopril in view of SIMÓN, resume metoprolol     #Hyperlipidemia-continue statin    #Chronic depression-continue Cymbalta            DISPOSITION: Home    Medications:  REVIEWED DAILY    Infusion Medications    dextrose      sodium chloride      lactated ringers 100 mL/hr at 09/09/21 1038     Scheduled Medications    DULoxetine  30 mg Oral Daily    fenofibrate  54 mg Oral Daily    ferrous sulfate  325 mg Oral Daily with breakfast    hydroxyurea  500 mg Oral BID    insulin glargine  25 Units SubCUTAneous Nightly    insulin lispro  0-12 Units SubCUTAneous TID WC    insulin lispro  0-6 Units SubCUTAneous Nightly    metoprolol succinate  25 mg Oral Daily    pantoprazole  40 mg Oral Daily    pravastatin  20 mg Oral Nightly    warfarin  7.5 mg Oral QPM    sodium chloride flush  5-40 mL IntraVENous 2 times per day    daptomycin (CUBICIN) IVPB  600 mg IntraVENous Q24H     PRN Meds: glucose, dextrose, glucagon (rDNA), dextrose, loperamide, melatonin, sodium chloride flush, sodium chloride, ondansetron **OR** ondansetron, polyethylene glycol, acetaminophen **OR** acetaminophen, oxyCODONE-acetaminophen **OR** oxyCODONE-acetaminophen, heparin (porcine), heparin (porcine), oxyCODONE-acetaminophen    Labs:     Recent Labs     09/07/21  1720 09/08/21  0432 09/09/21  0830   WBC 11.2 16.3* 7.9   HGB 12.7 13.2 9.9*   HCT 37.9 40.0 29.3*   * 640* 492*       Recent Labs     09/07/21  1720 09/08/21  0432 09/09/21  0830    144 139   K 2.9* 3.3* 3.4*    107 106   CO2 22 19* 23   BUN 5* 10 11   CREATININE 0.7 1.8* 0.9   CALCIUM 9.6 9.4 7.9*       Recent Labs     09/07/21  1720 09/08/21  0432   PROT 7.6 7.6   ALKPHOS 136* 138*   ALT 23 26   AST 24 26   BILITOT 0.3 0.2       Recent Labs     09/08/21  0432 09/09/21  0830   INR 1.8 2.6       Recent Labs     09/07/21  1720   CKTOTAL 110       Chronic labs:    Lab Results   Component Value Date    CHOL 154 03/29/2021    TRIG 202 (H) 03/29/2021    HDL 38 03/29/2021    LDLCALC 76 03/29/2021    TSH 1.650 03/29/2021    INR 2.6 09/09/2021    LABA1C 6.5 03/29/2021       Radiology: REVIEWED DAILY    +++++++++++++++++++++++++++++++++++++++++++++++++  Chantel Wen MD  [employer] - [address], [place]  +++++++++++++++++++++++++++++++++++++++++++++++++  NOTE: This report was transcribed using voice recognition software. Every effort was made to ensure accuracy; however, inadvertent computerized transcription errors may be present.

## 2021-09-09 NOTE — CARE COORDINATION
9/9/2021 - Care Coordination - admitted for acute kidney injury. Came to ED due to PICC line pulled out from right arm - currently being treated with IV daptomycin  for Staph warnerii bacteremia and left leg cellulitis. New PICC line placed in left upper arm. US revealed DVT in Dewanda Custard. Started on IV heparin infusion. Pt is active with [facility] for nursing and IV daptomycin. . Will need [facility] orders at discharge if no changes with [facility] needs. Plan is to discharge home with [facility] when medically stable. At discharge, pt states his son can provide a ride after 6 pm when he gets off after work. SW/CM will follow.

## 2021-09-09 NOTE — PROGRESS NOTES
[address] [place] [address] [facility]  LUDAIDA  Progress Note      C/C : Staph warneri bacteremia , left leg cellulitis     Denies fever and chills  Reports hip pain, leg pain   Afebrile       Current Facility-Administered Medications   Medication Dose Route Frequency Provider Last Rate Last Admin    DULoxetine (CYMBALTA) extended release capsule 30 mg  30 mg Oral Daily IGOR Watson CNP   30 mg at 09/09/21 0837    fenofibrate (TRICOR) tablet 54 mg  54 mg Oral Daily IGOR Watson CNP   54 mg at 09/09/21 0837    ferrous sulfate (IRON 325) tablet 325 mg  325 mg Oral Daily with breakfast IGOR Watson CNP   325 mg at 09/09/21 0837    hydroxyurea (HYDREA) chemo capsule 500 mg  500 mg Oral BID IGOR Watson CNP   500 mg at 09/09/21 0837    insulin glargine (LANTUS) injection vial 25 Units  25 Units SubCUTAneous Nightly IGOR Watson CNP   25 Units at 09/08/21 2141    insulin lispro (HUMALOG) injection vial 0-12 Units  0-12 Units SubCUTAneous TID WC IGOR Watson CNP        insulin lispro (HUMALOG) injection vial 0-6 Units  0-6 Units SubCUTAneous Nightly IGOR Watson CNP        glucose (GLUTOSE) 40 % oral gel 15 g  15 g Oral PRN IGOR Watson CNP        dextrose 50 % IV solution  12.5 g IntraVENous PRN IGOR Watson CNP        glucagon (rDNA) injection 1 mg  1 mg IntraMUSCular PRN GIOR Watson CNP        dextrose 5 % solution  100 mL/hr IntraVENous PRN IGOR Watson CNP        loperamide (IMODIUM) capsule 2 mg  2 mg Oral 4x Daily PRN IGOR Watson CNP        melatonin tablet 3 mg  3 mg Oral Nightly PRN IGOR Watson CNP        metoprolol succinate (TOPROL XL) extended release tablet 25 mg  25 mg Oral Daily IGOR Watson CNP   25 mg at 09/09/21 0837    pantoprazole (PROTONIX) tablet 40 mg  40 mg Oral Daily IGOR Watson CNP   40 mg at 09/09/21 0837    pravastatin (PRAVACHOL) tablet 20 mg  20 mg Oral Nightly Linnette Carrion APRN - CNP        warfarin (COUMADIN) tablet 7.5 mg  7.5 mg Oral QPM Linnette Carrion, APRN - CNP   7.5 mg at 09/08/21 1831    sodium chloride flush 0.9 % injection 5-40 mL  5-40 mL IntraVENous 2 times per day Linnette Sheyla, APRN - CNP   10 mL at 09/09/21 0839    sodium chloride flush 0.9 % injection 5-40 mL  5-40 mL IntraVENous PRN Linnette Sheyla, APRN - CNP        0.9 % sodium chloride infusion  25 mL IntraVENous PRN Linnette Carrion, APRN - CNP        ondansetron (ZOFRAN-ODT) disintegrating tablet 4 mg  4 mg Oral Q8H PRN Linnette Carrion, APRN - CNP        Or    ondansetron (ZOFRAN) injection 4 mg  4 mg IntraVENous Q6H PRN Linnette Carrion, APRN - CNP        polyethylene glycol (GLYCOLAX) packet 17 g  17 g Oral Daily PRN Linnette Carrion, APRN - CNP        acetaminophen (TYLENOL) tablet 650 mg  650 mg Oral Q6H PRN Linnette Carrion, APRN - CNP        Or    acetaminophen (TYLENOL) suppository 650 mg  650 mg Rectal Q6H PRN Linnette Sheyla, APRN - CNP        lactated ringers infusion   IntraVENous Continuous Linnette Sheyla, APRN -  mL/hr at 09/09/21 1038 New Bag at 09/09/21 1038    oxyCODONE-acetaminophen (PERCOCET) 5-325 MG per tablet 1 tablet  1 tablet Oral Q4H PRN Linnette Carrion, APRN - CNP        Or    oxyCODONE-acetaminophen (PERCOCET) 5-325 MG per tablet 2 tablet  2 tablet Oral Q4H PRN Linnette Carrion, APRN - CNP   2 tablet at 09/09/21 1038    heparin (porcine) injection 10,000 Units  10,000 Units IntraVENous PRN Marla Thakkar MD   10,000 Units at 09/09/21 0942    heparin (porcine) injection 5,000 Units  5,000 Units IntraVENous PRN Marla Thakkar MD   5,000 Units at 09/09/21 0212    heparin 25,000 units in dextrose 5% 250 mL (premix) infusion  5-30 Units/kg/hr IntraVENous Continuous Marla Thakkar MD 28.9 mL/hr at 09/09/21 0943 22 Units/kg/hr at 09/09/21 0943    oxyCODONE-acetaminophen (PERCOCET) 5-325 MG per tablet 1 tablet  1 tablet Oral Q4H PRN Mono Harris MD        DAPTOmycin (CUBICIN) 600 mg in sodium chloride 0.9 % 50 mL IVPB  600 mg IntraVENous Q24H IGOR Blue - CNP   Stopped at 09/08/21 1943         Review of systems:  As stated above in the chief complaint, otherwise negative. Physical examination :    /62   Pulse 80   Temp 97.8 °F (36.6 °C) (Oral)   Resp 18   Ht 6' 1\" (1.854 m)   Wt 290 lb (131.5 kg)   SpO2 98%   BMI 38.26 kg/m²       Constitutional: The patient is awake, alert, and oriented. Skin:  left leg erythema improved  . HEENT: No pallor, no thrush   Neck: Supple to movements.    Chest: Clear bilaterally   Cardiovascular: Regular, no murmur    Abdomen: Soft, non tender   Extremities: Left leg +2 pitting edema LLE    Lines: Left arm PICC line       Laboratory and Tests Review:  Lab Results   Component Value Date    WBC 7.9 09/09/2021    WBC 16.3 (H) 09/08/2021    WBC 11.2 09/07/2021    HGB 9.9 (L) 09/09/2021    HCT 29.3 (L) 09/09/2021    MCV 91.6 09/09/2021     (H) 09/09/2021     Lab Results   Component Value Date    NEUTROABS 4.41 09/09/2021    NEUTROABS 12.87 (H) 09/08/2021    NEUTROABS 7.22 09/07/2021     No results found for: [facility]  Lab Results   Component Value Date    ALT 26 09/08/2021    AST 26 09/08/2021    ALKPHOS 138 (H) 09/08/2021    BILITOT 0.2 09/08/2021     Lab Results   Component Value Date     09/09/2021    K 3.4 09/09/2021     09/09/2021    CO2 23 09/09/2021    BUN 11 09/09/2021    CREATININE 0.9 09/09/2021    CREATININE 1.8 09/08/2021    CREATININE 0.7 09/07/2021    GFRAA >60 09/09/2021    LABGLOM >60 09/09/2021    GLUCOSE 126 09/09/2021    GLUCOSE 125 05/11/2012    PROT 7.6 09/08/2021    LABALBU 4.4 09/08/2021    LABALBU 4.8 05/11/2012    CALCIUM 7.9 09/09/2021    BILITOT 0.2 09/08/2021    ALKPHOS 138 09/08/2021    AST 26 09/08/2021    ALT 26 09/08/2021     Lab Results   Component Value Date    CRP 4.0 (H) 08/31/2021    CRP 16.9 (H) 10/10/2020    CRP 13.4 (H) 09/24/2020     Lab Results   Component Value Date    SEDRATE 10 09/07/2021    SEDRATE 50 (H) 08/31/2021    SEDRATE 95 (H) 09/24/2020     Radiology:  rerviewed    Microbiology:     Susceptibility    Staphylococcus warneri (1)    Antibiotic Interpretation CARLA Status    clindamycin Sensitive ^0. 25 mcg/mL     DAPTOmycin Sensitive ^0. 25 mcg/mL     doxycycline Sensitive <=^0.5 mcg/mL     erythromycin Resistant >=^8 mcg/mL     gentamicin Sensitive <=^0.5 mcg/mL     oxacillin Sensitive <=^0.25 mcg/mL     trimethoprim-sulfamethoxazole Sensitive <=^10 mcg/mL     vancomycin Sensitive <=^0.5 mcg/mL     Lab and Collection    Culture, Blood 2 - 8/31/2021  CK total 110    ASSESSMENT:  · Staphylococcus warneri bacteremia - ( methicillin sensitive on cx , but resistance on PCR  · Lymphangitis left lower extremity improving   · DVT left leg  · Left hip pain - improved   · SIMÓN - improved      PLAN:      · Daptomycin 6 mg / kg IV q 24 hr  · CPK level   · Monitor labs /Cr   · Will follow with you    Tata Au MD  1:58 PM  9/9/2021

## 2021-09-09 NOTE — PROGRESS NOTES
Pharmacy Consultation Note  (Warfarin Dosing and Monitoring)    Initial consult date: 09/09/2021   Consulting physician: Titus Garcia MD    Allergies:  Seasonal and Tramadol    50 y.o. male    Ht Readings from Last 1 Encounters:   09/08/21 6' 1\" (1.854 m)     Wt Readings from Last 1 Encounters:   09/08/21 290 lb (131.5 kg)         Warfarin Indication Target   INR Range Home Dose  (if applicable) Diet/Feeding Tube   (Enteral feeds, nutritional drinks, increased Vitamin K in diet can decrease INR)   DVT/PE 2-3 7.5 mg daily ADULT DIET       x Home Med? Meds Increasing INR x Home Med?  Meds Decreasing INR     Allopurinol    Azathioprine     Amiodarone/Propafenone/Dronedarone   Carbamazepine     Androgens   Cholestyramine     Chemotherapy (BBW: Capecitabine)   Estrogen     Ciprofloxacin/Levofloxacin   Nafcillin/Dicloxacillin     Clarithromycin/Erythromycin/Azithromycin   Barbiturates      Fluconazole/Itraconazole/Voriconazole/Ketoconazole   Phenytoin (Variable)     Metronidazole   Rifampin     Phenytoin (Variable)   Steroids (Variable/Dose Dependent)   X  Y  Statins/Fenofibrate/Gemfibrozil   Sucralfate     Steroids (Variable/Dose Dependent)   Other:     Sulfamethoxazole/Trimethoprim        Tramadol         Other:        Comments regarding medication interactions:      x Diseases Affecting INR x Increased Bleeding Risk    CHF Exacerbation (Increases)  History GI Bleed/PUD    Liver Disease (Increases)  Chronic NSAID Use    Thyroid: Hyper (Increases)  Hypo (Decreases)  Chronic ASA/Antiplatelet Use (Clopidogrel/ Dipyridamole/Prasugrel/Ticagrelor)      Malignancy (Increases)  Abnormal Renal Function (dialysis, renal transplant, SCr ? 2.3 mg/dL)     History of EtOH Abuse: Acute (Increases)   Chronic (Decreases)  Liver Function (cirrhosis, bilirubin >2x ULN with AST/ALT/AP >3x ULN)    Fever (Increases)  Age > 65 years    Acute infection (Increases)  Hypertension/Uncontrolled BP    Diarrhea/Dehydration (Increases)  History of stroke    Other: __________________  Other:___________________       Vitamin K or Blood product  Administration Date                    TSH:    Lab Results   Component Value Date    TSH 1.650 03/29/2021        Hepatic Function Panel:                            Lab Results   Component Value Date    ALKPHOS 138 09/08/2021    ALT 26 09/08/2021    AST 26 09/08/2021    PROT 7.6 09/08/2021    BILITOT 0.2 09/08/2021    BILIDIR <0.2 09/22/2020    IBILI see below 09/22/2020    LABALBU 4.4 09/08/2021    LABALBU 4.8 05/11/2012       Date Warfarin Dose INR Heparin or LMWH HGB/HCT PLT Comment   9/8 7.5 mg 1.8  13.2/40 640    9/9 <7.5 mg> 2.6  9.9/39.3 492                                 Assessment and Plan:  · Patient is R 42 GN male   · Patient is on warfarin for history of recurrent DVT, s/p vena cava filter and recommended life long anticoagulation  · Patient with recent admission 8/31-9/5. Pharmacy was consulted to dose warfarin during previous admission, patient was stable on warfarin 7.5 mg daily and bridge with enoxaparin 135 mg SubQ BID. · Goal INR 2-3, 2.5 per vascular notes  · Patient had PICC placed in RUE on previous admission for long term antibiotics, patient presenting with PICC line partially pulled out and found new DVT in RUE. · 9/9: Patient started on heparin IV heparin infusion for new DVT. INR 2.6 today. Will continue same warfarin dosing.      Plan  · Warfarin 7.5 mg daily ordered by IGOR Bradford-CNP  · Daily PT/INR until the INR is stable within the therapeutic range  · Pharmacist will follow and monitor/adjust dosing as necessary    Thank you for this consult,    Kingston Cortez, PharmD 9/9/2021 4:20 PM  Pharmacy Resident   [phone]

## 2021-09-10 VITALS
DIASTOLIC BLOOD PRESSURE: 88 MMHG | OXYGEN SATURATION: 100 % | HEIGHT: 73 IN | TEMPERATURE: 98.1 F | HEART RATE: 76 BPM | WEIGHT: 290 LBS | BODY MASS INDEX: 38.43 KG/M2 | SYSTOLIC BLOOD PRESSURE: 157 MMHG | RESPIRATION RATE: 16 BRPM

## 2021-09-10 LAB
ALBUMIN SERPL-MCNC: 3.6 G/DL (ref 3.5–5.2)
ALP BLD-CCNC: 115 U/L (ref 40–129)
ALT SERPL-CCNC: 24 U/L (ref 0–40)
ANION GAP SERPL CALCULATED.3IONS-SCNC: 11 MMOL/L (ref 7–16)
AST SERPL-CCNC: 27 U/L (ref 0–39)
BASOPHILS ABSOLUTE: 0.08 E9/L (ref 0–0.2)
BASOPHILS RELATIVE PERCENT: 1 % (ref 0–2)
BILIRUB SERPL-MCNC: 0.3 MG/DL (ref 0–1.2)
BUN BLDV-MCNC: 12 MG/DL (ref 6–20)
CALCIUM SERPL-MCNC: 8.5 MG/DL (ref 8.6–10.2)
CHLORIDE BLD-SCNC: 104 MMOL/L (ref 98–107)
CO2: 24 MMOL/L (ref 22–29)
CREAT SERPL-MCNC: 1 MG/DL (ref 0.7–1.2)
EOSINOPHILS ABSOLUTE: 0.48 E9/L (ref 0.05–0.5)
EOSINOPHILS RELATIVE PERCENT: 6 % (ref 0–6)
GFR AFRICAN AMERICAN: >60
GFR NON-AFRICAN AMERICAN: >60 ML/MIN/1.73
GLUCOSE BLD-MCNC: 87 MG/DL (ref 74–99)
HCT VFR BLD CALC: 32.3 % (ref 37–54)
HEMOGLOBIN: 11 G/DL (ref 12.5–16.5)
IMMATURE GRANULOCYTES #: 0.04 E9/L
IMMATURE GRANULOCYTES %: 0.5 % (ref 0–5)
INR BLD: 2.4
LYMPHOCYTES ABSOLUTE: 2.19 E9/L (ref 1.5–4)
LYMPHOCYTES RELATIVE PERCENT: 27.4 % (ref 20–42)
MAGNESIUM: 1.8 MG/DL (ref 1.6–2.6)
MCH RBC QN AUTO: 31.3 PG (ref 26–35)
MCHC RBC AUTO-ENTMCNC: 34.1 % (ref 32–34.5)
MCV RBC AUTO: 91.8 FL (ref 80–99.9)
METER GLUCOSE: 75 MG/DL (ref 74–99)
METER GLUCOSE: 86 MG/DL (ref 74–99)
MONOCYTES ABSOLUTE: 0.85 E9/L (ref 0.1–0.95)
MONOCYTES RELATIVE PERCENT: 10.6 % (ref 2–12)
NEUTROPHILS ABSOLUTE: 4.36 E9/L (ref 1.8–7.3)
NEUTROPHILS RELATIVE PERCENT: 54.5 % (ref 43–80)
PDW BLD-RTO: 15.8 FL (ref 11.5–15)
PLATELET # BLD: 542 E9/L (ref 130–450)
PMV BLD AUTO: 9.3 FL (ref 7–12)
POTASSIUM SERPL-SCNC: 3.4 MMOL/L (ref 3.5–5)
PROTHROMBIN TIME: 26.3 SEC (ref 9.3–12.4)
RBC # BLD: 3.52 E12/L (ref 3.8–5.8)
SODIUM BLD-SCNC: 139 MMOL/L (ref 132–146)
TOTAL PROTEIN: 6.1 G/DL (ref 6.4–8.3)
WBC # BLD: 8 E9/L (ref 4.5–11.5)

## 2021-09-10 PROCEDURE — 85025 COMPLETE CBC W/AUTO DIFF WBC: CPT

## 2021-09-10 PROCEDURE — 85610 PROTHROMBIN TIME: CPT

## 2021-09-10 PROCEDURE — 6370000000 HC RX 637 (ALT 250 FOR IP): Performed by: FAMILY MEDICINE

## 2021-09-10 PROCEDURE — 36415 COLL VENOUS BLD VENIPUNCTURE: CPT

## 2021-09-10 PROCEDURE — 80053 COMPREHEN METABOLIC PANEL: CPT

## 2021-09-10 PROCEDURE — 6370000000 HC RX 637 (ALT 250 FOR IP): Performed by: NURSE PRACTITIONER

## 2021-09-10 PROCEDURE — 6360000002 HC RX W HCPCS: Performed by: NURSE PRACTITIONER

## 2021-09-10 PROCEDURE — 83735 ASSAY OF MAGNESIUM: CPT

## 2021-09-10 PROCEDURE — 2580000003 HC RX 258: Performed by: NURSE PRACTITIONER

## 2021-09-10 PROCEDURE — 82962 GLUCOSE BLOOD TEST: CPT

## 2021-09-10 RX ORDER — OXYCODONE HYDROCHLORIDE AND ACETAMINOPHEN 5; 325 MG/1; MG/1
1 TABLET ORAL EVERY 6 HOURS PRN
Qty: 12 TABLET | Refills: 0 | Status: SHIPPED | OUTPATIENT
Start: 2021-09-10 | End: 2021-09-13

## 2021-09-10 RX ADMIN — SODIUM CHLORIDE, PRESERVATIVE FREE 10 ML: 5 INJECTION INTRAVENOUS at 09:47

## 2021-09-10 RX ADMIN — FERROUS SULFATE TAB 325 MG (65 MG ELEMENTAL FE) 325 MG: 325 (65 FE) TAB at 09:47

## 2021-09-10 RX ADMIN — FENOFIBRATE 54 MG: 54 TABLET ORAL at 09:47

## 2021-09-10 RX ADMIN — DULOXETINE HYDROCHLORIDE 30 MG: 30 CAPSULE, DELAYED RELEASE ORAL at 09:46

## 2021-09-10 RX ADMIN — HYDROXYUREA 500 MG: 500 CAPSULE ORAL at 09:47

## 2021-09-10 RX ADMIN — OXYCODONE AND ACETAMINOPHEN 2 TABLET: 5; 325 TABLET ORAL at 03:26

## 2021-09-10 RX ADMIN — METOPROLOL SUCCINATE 25 MG: 25 TABLET, EXTENDED RELEASE ORAL at 09:47

## 2021-09-10 RX ADMIN — PANTOPRAZOLE SODIUM 40 MG: 40 TABLET, DELAYED RELEASE ORAL at 09:47

## 2021-09-10 RX ADMIN — SODIUM CHLORIDE, POTASSIUM CHLORIDE, SODIUM LACTATE AND CALCIUM CHLORIDE: 600; 310; 30; 20 INJECTION, SOLUTION INTRAVENOUS at 03:40

## 2021-09-10 RX ADMIN — OXYCODONE AND ACETAMINOPHEN 2 TABLET: 5; 325 TABLET ORAL at 08:15

## 2021-09-10 RX ADMIN — DAPTOMYCIN 600 MG: 500 INJECTION, POWDER, LYOPHILIZED, FOR SOLUTION INTRAVENOUS at 14:30

## 2021-09-10 RX ADMIN — OXYCODONE HYDROCHLORIDE AND ACETAMINOPHEN 1 TABLET: 5; 325 TABLET ORAL at 12:33

## 2021-09-10 ASSESSMENT — PAIN SCALES - GENERAL
PAINLEVEL_OUTOF10: 7
PAINLEVEL_OUTOF10: 8
PAINLEVEL_OUTOF10: 8

## 2021-09-10 NOTE — PLAN OF CARE
Problem: Falls - Risk of:  Goal: Will remain free from falls  Description: Will remain free from falls  9/10/2021 1337 by Cecilia Duverney, RN  Outcome: Completed  9/10/2021 0019 by Arnulfo Lopez RN  Outcome: Met This Shift  Goal: Absence of physical injury  Description: Absence of physical injury  9/10/2021 1337 by Cecilia Duverney, RN  Outcome: Completed  9/10/2021 0019 by Arnulfo Lopez RN  Outcome: Met This Shift     Problem: Pain:  Goal: Pain level will decrease  Description: Pain level will decrease  Outcome: Completed  Goal: Control of acute pain  Description: Control of acute pain  9/10/2021 1337 by Cecilia Duverney, RN  Outcome: Completed  9/10/2021 0019 by Arnulfo Lopez RN  Outcome: Ongoing  Goal: Control of chronic pain  Description: Control of chronic pain  Outcome: Completed

## 2021-09-10 NOTE — PROGRESS NOTES
Discharge discussed with patient. Peripheral IV catheter removed without complications. Cardiac monitor removed and placed in appropriate storage per unit guidelines. Discharge instructions given and thoroughly explained to patient discussing new medications, diagnosis and follow-ups needed for outpatient. Patient verified all belongings are accounted for. Patient receiving IV antibiotic via PICC line once antibiotic complete, patient to discharge home.      Nicolette Valdovinos RN, BSN

## 2021-09-10 NOTE — PROGRESS NOTES
Pharmacy Consultation Note  (Warfarin Dosing and Monitoring)     Initial consult date: 09/09/2021             Consulting physician: Rosemarie Mina MD     Allergies:  Seasonal and Tramadol     50 y.o. male         Ht Readings from Last 1 Encounters:   09/08/21 6' 1\" (1.854 m)          Wt Readings from Last 1 Encounters:   09/08/21 290 lb (131.5 kg)            Warfarin Indication Target   INR Range Home Dose  (if applicable) Diet/Feeding Tube   (Enteral feeds, nutritional drinks, increased Vitamin K in diet can decrease INR)   DVT/PE 2-3 7.5 mg daily ADULT DIET         x Home Med? Meds Increasing INR x Home Med?  Meds Decreasing INR       Allopurinol      Azathioprine       Amiodarone/Propafenone/Dronedarone     Carbamazepine       Androgens     Cholestyramine       Chemotherapy (BBW: Capecitabine)     Estrogen       Ciprofloxacin/Levofloxacin     Nafcillin/Dicloxacillin       Clarithromycin/Erythromycin/Azithromycin     Barbiturates        Fluconazole/Itraconazole/Voriconazole/Ketoconazole     Phenytoin (Variable)       Metronidazole     Rifampin       Phenytoin (Variable)     Steroids (Variable/Dose Dependent)   X  Y  Statins/Fenofibrate/Gemfibrozil     Sucralfate       Steroids (Variable/Dose Dependent)     Other:       Sulfamethoxazole/Trimethoprim             Tramadol              Other:            Comments regarding medication interactions:        x Diseases Affecting INR x Increased Bleeding Risk     CHF Exacerbation (Increases)   History GI Bleed/PUD     Liver Disease (Increases)   Chronic NSAID Use     Thyroid: Hyper (Increases)  Hypo (Decreases)   Chronic ASA/Antiplatelet Use (Clopidogrel/ Dipyridamole/Prasugrel/Ticagrelor)       Malignancy (Increases)   Abnormal Renal Function (dialysis, renal transplant, SCr ? 2.3 mg/dL)      History of EtOH Abuse: Acute (Increases)   Chronic (Decreases)   Liver Function (cirrhosis, bilirubin >2x ULN with AST/ALT/AP >3x ULN)     Fever (Increases)   Age > 65 years     Acute infection (Increases)   Hypertension/Uncontrolled BP     Diarrhea/Dehydration (Increases)   History of stroke     Other: __________________   Other:___________________         Vitamin K or Blood product  Administration Date                               TSH:          Lab Results   Component Value Date     TSH 1.650 03/29/2021                     Hepatic Function Panel:                                  Lab Results   Component Value Date     ALKPHOS 138 09/08/2021     ALT 26 09/08/2021     AST 26 09/08/2021     PROT 7.6 09/08/2021     BILITOT 0.2 09/08/2021     BILIDIR <0.2 09/22/2020     IBILI see below 09/22/2020     LABALBU 4.4 09/08/2021     LABALBU 4.8 05/11/2012         Date Warfarin Dose INR Heparin or LMWH HGB/HCT PLT Comment   9/8 7.5 mg 1.8   13.2/40 640     9/9 7.5 mg 2.6   9.9/39.3 492      9/10  <7.5 mg>  2.4  X 11.0/32.3  542                                        Assessment and Plan:  · Patient is J 58 FE male   · Patient is on warfarin for history of recurrent DVT, s/p vena cava filter and recommended life long anticoagulation  · Patient with recent admission 8/31-9/5. Pharmacy was consulted to dose warfarin during previous admission, patient was stable on warfarin 7.5 mg daily and bridge with enoxaparin 135 mg SubQ BID. · Goal INR 2-3, 2.5 per vascular notes  · Patient had PICC placed in RUE on previous admission for long term antibiotics, patient presenting with PICC line partially pulled out and found new DVT in RUE. · 9/9: Patient started on heparin IV heparin infusion for new DVT. INR 2.6 today. Will continue same warfarin dosing.    · 9/10: INR 2.4 today      Plan  · Continue warfarin 7.5 mg daily   · Daily PT/INR until the INR is stable within the therapeutic range  · Pharmacist will follow and monitor/adjust dosing as necessary     Thank you for this consult,     Mario Slade, PharmD 9/10/2021 9:33 AM   Pharmacy Resident    [phone]

## 2021-09-10 NOTE — CARE COORDINATION
[facility] notified of resume orders and discharge today. Patient will need his dose of dapto before he is released and homecare will be out for a visit tomorrow. For questions I can be reached at [phone].  [place], [place]

## 2021-09-10 NOTE — PROGRESS NOTES
[address] [place] [address] [facility]  MADY  Progress Note      C/C : Staph warneri bacteremia , left leg cellulitis     Denies fever and chills  Reports hip pain, leg pain   Afebrile       Current Facility-Administered Medications   Medication Dose Route Frequency Provider Last Rate Last Admin    DULoxetine (CYMBALTA) extended release capsule 30 mg  30 mg Oral Daily IGOR Miller CNP   30 mg at 09/10/21 0946    fenofibrate (TRICOR) tablet 54 mg  54 mg Oral Daily IGOR Miller CNP   54 mg at 09/10/21 0947    ferrous sulfate (IRON 325) tablet 325 mg  325 mg Oral Daily with breakfast IGOR Miller CNP   325 mg at 09/10/21 0947    hydroxyurea (HYDREA) chemo capsule 500 mg  500 mg Oral BID IGOR Miller CNP   500 mg at 09/10/21 0947    insulin glargine (LANTUS) injection vial 25 Units  25 Units SubCUTAneous Nightly IGOR Miller CNP   25 Units at 09/09/21 2149    insulin lispro (HUMALOG) injection vial 0-12 Units  0-12 Units SubCUTAneous TID WC IGOR Miller CNP        insulin lispro (HUMALOG) injection vial 0-6 Units  0-6 Units SubCUTAneous Nightly IGOR Miller CNP        glucose (GLUTOSE) 40 % oral gel 15 g  15 g Oral PRN IGOR Mliler CNP        dextrose 50 % IV solution  12.5 g IntraVENous PRN IGOR Miller CNP        glucagon (rDNA) injection 1 mg  1 mg IntraMUSCular PRN IGOR Miller CNP        dextrose 5 % solution  100 mL/hr IntraVENous PRN IGOR Miller CNP        loperamide (IMODIUM) capsule 2 mg  2 mg Oral 4x Daily PRN IGOR Miller CNP        melatonin tablet 3 mg  3 mg Oral Nightly PRN IGOR Miller CNP        metoprolol succinate (TOPROL XL) extended release tablet 25 mg  25 mg Oral Daily IGOR Miller CNP   25 mg at 09/10/21 0947    pantoprazole (PROTONIX) tablet 40 mg  40 mg Oral Daily IGOR Miller CNP   40 mg at 09/10/21 0947    pravastatin (PRAVACHOL) tablet 20 mg  20 mg Oral Nightly Saniyaona Geethar, APRN - CNP   20 mg at 09/09/21 2149    warfarin (COUMADIN) tablet 7.5 mg  7.5 mg Oral QPM Halle Stevens, APRN - CNP   7.5 mg at 09/09/21 1824    sodium chloride flush 0.9 % injection 5-40 mL  5-40 mL IntraVENous 2 times per day Saniyaona Geethar, APRN - CNP   10 mL at 09/10/21 0947    sodium chloride flush 0.9 % injection 5-40 mL  5-40 mL IntraVENous PRN Halle Iniguezr, APRN - CNP        0.9 % sodium chloride infusion  25 mL IntraVENous PRN Halle Iniguezr, APRN - CNP        ondansetron (ZOFRAN-ODT) disintegrating tablet 4 mg  4 mg Oral Q8H PRN Halle Stevens, APRN - CNP        Or    ondansetron (ZOFRAN) injection 4 mg  4 mg IntraVENous Q6H PRN Halle Stevens, APRN - CNP        polyethylene glycol (GLYCOLAX) packet 17 g  17 g Oral Daily PRN Halle Iniguezr, APRN - CNP        acetaminophen (TYLENOL) tablet 650 mg  650 mg Oral Q6H PRN Halle Stevens, APRN - CNP        Or    acetaminophen (TYLENOL) suppository 650 mg  650 mg Rectal Q6H PRN Halle Iniguezr, APRN - CNP        lactated ringers infusion   IntraVENous Continuous Halle Stevens, APRN -  mL/hr at 09/10/21 0651 Rate Verify at 09/10/21 0651    oxyCODONE-acetaminophen (PERCOCET) 5-325 MG per tablet 1 tablet  1 tablet Oral Q4H PRN Halle Stevens, APRN - CNP        Or    oxyCODONE-acetaminophen (PERCOCET) 5-325 MG per tablet 2 tablet  2 tablet Oral Q4H PRN Halle Stevens, APRN - CNP   2 tablet at 09/10/21 0815    oxyCODONE-acetaminophen (PERCOCET) 5-325 MG per tablet 1 tablet  1 tablet Oral Q4H PRN Nasrin Briggs MD   1 tablet at 09/10/21 1233    DAPTOmycin (CUBICIN) 600 mg in sodium chloride 0.9 % 50 mL IVPB  600 mg IntraVENous Q24H Remona Mariner, APRN - CNP   Stopped at 09/09/21 9459         Review of systems:  As stated above in the chief complaint, otherwise negative.       Physical examination :    BP (!) 157/88   Pulse 76 Temp 98.1 °F (36.7 °C) (Oral)   Resp 16   Ht 6' 1\" (1.854 m)   Wt 290 lb (131.5 kg)   SpO2 100%   BMI 38.26 kg/m²       Constitutional: The patient is awake, alert, and oriented. Skin:  left leg erythema improved  . HEENT: No pallor, no thrush   Neck: Supple to movements. Chest: Clear bilaterally   Cardiovascular: Regular, no murmur    Abdomen: Soft, non tender   Extremities: Left leg +2 pitting edema LLE    Lines: Left arm PICC line       Laboratory and Tests Review:  Lab Results   Component Value Date    WBC 8.0 09/10/2021    WBC 7.9 09/09/2021    WBC 16.3 (H) 09/08/2021    HGB 11.0 (L) 09/10/2021    HCT 32.3 (L) 09/10/2021    MCV 91.8 09/10/2021     (H) 09/10/2021     Lab Results   Component Value Date    NEUTROABS 4.36 09/10/2021    NEUTROABS 4.41 09/09/2021    NEUTROABS 12.87 (H) 09/08/2021     No results found for: [facility]  Lab Results   Component Value Date    ALT 24 09/10/2021    AST 27 09/10/2021    ALKPHOS 115 09/10/2021    BILITOT 0.3 09/10/2021     Lab Results   Component Value Date     09/10/2021    K 3.4 09/10/2021    K 3.4 09/09/2021     09/10/2021    CO2 24 09/10/2021    BUN 12 09/10/2021    CREATININE 1.0 09/10/2021    CREATININE 0.9 09/09/2021    CREATININE 1.8 09/08/2021    GFRAA >60 09/10/2021    LABGLOM >60 09/10/2021    GLUCOSE 87 09/10/2021    GLUCOSE 125 05/11/2012    PROT 6.1 09/10/2021    LABALBU 3.6 09/10/2021    LABALBU 4.8 05/11/2012    CALCIUM 8.5 09/10/2021    BILITOT 0.3 09/10/2021    ALKPHOS 115 09/10/2021    AST 27 09/10/2021    ALT 24 09/10/2021     Lab Results   Component Value Date    CRP 4.0 (H) 08/31/2021    CRP 16.9 (H) 10/10/2020    CRP 13.4 (H) 09/24/2020     Lab Results   Component Value Date    SEDRATE 10 09/07/2021    SEDRATE 50 (H) 08/31/2021    SEDRATE 95 (H) 09/24/2020     Radiology:  rerviewed    Microbiology:     Susceptibility    Staphylococcus warneri (1)    Antibiotic Interpretation CARLA Status    clindamycin Sensitive ^0. 25 mcg/mL DAPTOmycin Sensitive ^0. 25 mcg/mL     doxycycline Sensitive <=^0.5 mcg/mL     erythromycin Resistant >=^8 mcg/mL     gentamicin Sensitive <=^0.5 mcg/mL     oxacillin Sensitive <=^0.25 mcg/mL     trimethoprim-sulfamethoxazole Sensitive <=^10 mcg/mL     vancomycin Sensitive <=^0.5 mcg/mL     Lab and Collection    Culture, Blood 2 - 8/31/2021  CK total 110    ASSESSMENT:  · Staphylococcus warneri bacteremia - ( methicillin sensitive on cx , but resistance on PCR  · Lymphangitis left lower extremity improving   · DVT left leg  · Left hip pain - improved   · SIMÓN - improved      PLAN:      · Daptomycin 6 mg / kg IV q 24 hr (stop 10/14/2021)   · CPK level   · Monitor labs /Cr   · ID follow up in 1-2 weeks     Obed Olmstead MD  1:52 PM  9/10/2021

## 2021-09-10 NOTE — PROGRESS NOTES
Call placed to Dr. Jason Grant office regarding if they would like to see patient prior to discharge or if patient could follow-up outpatient. NP with Dr. Jason Grant returned call, patient information given including history of Eliquis use, and now on 7.5mg of Coumadin daily, patient's history with recurrent DVTs. She will call Dr. Jason Grant and get back to me if there is any further recommendations for patient. Patient is discharged but will still need his antibiotic prior to leaving, waiting for pharmacy to dispense.      Ron Anderson RN, BSN

## 2021-09-10 NOTE — CONSULTS
[facility]  Hematology/Oncology  Consult      Patient Name: Alicia Rodriguez II  YOB: 1975  PCP: IGOR Sellers CNP   Referring Provider:      Reason for Consultation:   Chief Complaint   Patient presents with    Other     Pt has PICC line right arm, states he rolled over in bed and pulled it partially out. Pt believes it's torn in half        History of Present Illness: This is a 68-year-old male patient of Dr. Andrea Swan who is being seen for thrombocytosis on Hydrea 500 mg twice daily as well as an aspirin 81 mg, moderate normocytic anemia, and chronic BL LE DVT on Coumadin and IVC filter. 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 Patient was recently hospitalized earlier this month for cellulitis of LLE and recurrent DVT. He was sent home with a PICC line and 6 weeks of IV Daptomycin. Patient presented to the ED after accidentally pulling PICC line out and concerns for part of PICC line retain in upper extremity. Chest XR negative. R humerus XR negative for foreign body. RUE doppler positive for DVT. Blood work significant for SIMÓN with a creatinine 1.8. CBC with leukocytosis and thrombocytosis. Consultation for recurrent DVT's on anticoagulation.      Diagnostic Data:     Past Medical History:   Diagnosis Date    Accident 11/2019    stepped on nail rt ft about 1 month ago- healed per patient    Acute thrombosis of inferior vena cava ([address] 10/10/2020    SIMÓN (acute kidney injury) ([address] 2008 apx    kidney bruised due to fall / Laurann Sing    Allergic rhinitis     Blister of left leg 8/31/2021    Cellulitis of leg, left 8/31/2021    Chronic back pain     Depression     Dermatophytosis 8/31/2021    Diabetes mellitus ([address]     Difficulty sleeping     at times    Displacement of lumbar intervertebral disc without myelopathy     Fibromyalgia     Fractured rib     2008 / healed    H/O seasonal allergies     Head injury 1980'S apx    no residual s/s    History of deep vein thrombosis 8/31/2021    Hyperlipidemia     Hypertension     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    Lymphedema of left leg 8/31/2021    Obesity (BMI 35.0-39.9 without comorbidity)     bmi 39.2  weight 296 #    Osteoarthritis     Recurrent acute deep vein thrombosis (DVT) of left lower extremity ([address] 8/31/2021    S/P IVC filter 8/31/2021    Subtherapeutic international normalized ratio (INR) 8/31/2021    Thoracic or lumbosacral neuritis or radiculitis, unspecified        Patient Active Problem List    Diagnosis Date Noted    SIMÓN (acute kidney injury) ([address] 09/08/2021    Cellulitis of left lower extremity 08/31/2021    Dermatophytosis 08/31/2021    Lymphedema of left leg 08/31/2021    Blister of left leg 08/31/2021    Recurrent acute deep vein thrombosis (DVT) of left lower extremity ([address] 08/31/2021    S/P IVC filter 08/31/2021    History of deep vein thrombosis 08/31/2021    Subtherapeutic international normalized ratio (INR) 08/31/2021    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[address] 08/31/2021    Tobacco dependence 11/19/2020    Anticoagulated 11/19/2020    Acute deep vein thrombosis (DVT) ([address] 10/07/2020    Acute blood loss anemia     Thrombocytosis ([address] 08/10/2020    Abnormal findings on diagnostic imaging of gall bladder 08/01/2020    Cyst of spleen 08/01/2020    Other pulmonary embolism without acute cor pulmonale (HCC) 08/01/2020    Rectus diastasis 11/01/2019    Recurrent unilateral inguinal hernia 11/01/2019    Neuropathy 06/30/2019    Left leg swelling 06/30/2019    Other hyperlipidemia 10/13/2017    Primary osteoarthritis of right hip 11/03/2016    Primary osteoarthritis of left hip 04/11/2016    Type 2 diabetes mellitus ([facility] 04/08/2016    Lumbar disc herniation 02/22/2016    Lumbar radiculopathy 12/17/2015    Osteoarthritis of spine with radiculopathy, lumbar region 12/17/2015    Class 1 obesity in adult 12/17/2015    Allergic rhinitis 11/23/2015    Essential hypertension 10/16/2015    Vitamin D deficiency 04/14/2015    Fibromyalgia 12/15/2014    Insomnia 07/04/2014    Osteoarthritis 03/27/2014    Lumbar spondylosis 01/07/2014    Neuropathic pain 05/08/2012        Past Surgical History:   Procedure Laterality Date    COLONOSCOPY N/A 9/5/2020    COLONOSCOPY WITH BIOPSY performed by Dominique Claudio MD at [address] PTC NEW ACCESS  8/3/2020    CT PTC NEW ACCESS 8/3/2020 [facility]    FOOT SURGERY Right 1985    to treat shattered bones    HERNIA REPAIR  2001    DOUBLE HERNIA    HERNIA REPAIR Right 12/9/2019    LAPAROSCOPIC RIGHT INGUINAL HERNIA REPAIR, MESH 10x15 cm PLACEMENT performed by Enoc Navarrete MD at [facility] Left 4/11/2016    ILIAC ARTERY STENT INSERTION N/A 10/11/2020    S/P ILIAC STENT PLACEMENT VISUALIZATION performed by Lorenzo Lau MD at [place] [address] N/A 9/18/2020    LAPAROTOMY EXPLORATORY, CHOLECYSTECTOMY, BOWEL RESECTION, right darian colectomy, partial omentectomy, and splenectomy performed by Dominique Claudio MD at [facility] COLONOSCOPY FLX DX W/MARIA ALEJANDRA Queen 1978 PFRMD N/A 5/7/2018    COLONOSCOPY DIAGNOSTIC performed by Dona Galarza MD at [address] [facility] Left 2014    Dr. Zelda Alonso ARTHROSCOPY Left 11 21 14    SHOULDER SURGERY  2001 &2007    RIGHT AND LEFT repair of tears    THROMBECTOMY / EMBOLECTOMY FEMORAL Left 1/1/2021    LEFT LOWER EXTREMITY, VENOGRAM, FOLLOW UP POSSIBLE REMOVAL LYSIS CATHETER performed by Lorenzo Lau MD at [facility] / EMBOLECTOMY FEMORAL Left 1/2/2021    LEFT LOWER EXTREMITY VENOGRAM performed by Gladys Villa 4 times daily as needed for Diarrhea 4/19/21   IGOR Campos CNP   potassium chloride (KLOR-CON M) 20 MEQ extended release tablet Take 1 tablet by mouth 2 times daily (with meals) 10/15/20   Suha Elias MD   metoprolol succinate (TOPROL XL) 25 MG extended release tablet Take 1 tablet by mouth daily 10/16/20   Suha Elias MD   melatonin 3 MG TABS tablet Take 3 mg by mouth nightly as needed (sleep)    Historical Provider, MD   cyclobenzaprine (FLEXERIL) 10 MG tablet Take 10 mg by mouth three times daily    Historical Provider, MD   fenofibrate (TRICOR) 54 MG tablet Take 54 mg by mouth daily    Historical Provider, MD   ferrous sulfate (IRON 325) 325 (65 Fe) MG tablet Take 325 mg by mouth daily (with breakfast)     Historical Provider, MD   hydroxyurea (HYDREA) 500 MG chemo capsule Take 500 mg by mouth 2 times daily    Historical Provider, MD   pantoprazole (PROTONIX) 40 MG tablet Take 40 mg by mouth daily    Historical Provider, MD   DULoxetine (CYMBALTA) 30 MG extended release capsule Take 1 capsule by mouth daily 5/14/20   IGOR Campos CNP   pravastatin (PRAVACHOL) 20 MG tablet Take 1 tablet by mouth nightly 5/14/20   IGOR Campos CNP       Allergies  Allergies   Allergen Reactions    Seasonal      HAYFEVER / sneezing,watery eyes    Tramadol Itching       Review of Systems: Pain in L leg and hip with ambulation.  Constitutional:  No fever chills or rigors.  Eyes: No changes in vision, discharge, or pain   ENT: No Headaches, hearing loss or vertigo. No mouth sores or sore throat. No change in taste or smell.  Cardiovascular: No chest discomfort, dyspnea on exertion, palpitations or loss of consciousness. or phlebitis.  Respiratory: Has no cough or wheezing, Has no sputum production. Has no hemoptysis, Has no pleuritic pain, .  Gastrointestinal: No abdominal pain, appetite loss, blood in stools. No change in bowel habits. No hematemesis    Genitourinary: Patient acknowledges no dysuria, trouble voiding, or hematuria. No nocturia or increased frequency.  Musculoskeletal: No gait disturbance, weakness or joint complaints.  Integumentary: No rash or pruritis.  Neurological: No headache, diplopia, change in muscle strength, numbness or tingling. No change in gait, balance, coordination, mood, affect, memory, mentation, behavior.  Psychiatric: No anxiety, or depression.  Endocrine: No temperature intolerance. No excessive thirst, fluid intake, or urination. No tremor.  Hematologic/Lymphatic: No abnormal bruising or bleeding, blood clots or swollen lymph nodes.  Allergic/Immunologic: No nasal congestion or hives. Objective  BP (!) 157/88   Pulse 76   Temp 98.1 °F (36.7 °C) (Oral)   Resp 16   Ht 6' 1\" (1.854 m)   Wt 290 lb (131.5 kg)   SpO2 100%   BMI 38.26 kg/m²     Physical Exam:   Performance Status:  General: AAO to person, place, time, in no acute distress,   Head and neck : PERRLA, EOMI . Sclera non icteric. Oropharynx : Clear  Neck: no JVD,  no adenopathy  Heart: Regular rate and regular rhythm, no murmur  Lungs: Clear to auscultation   Extremities: LLE +3 edema,no cyanosis, no clubbing. Abdomen: Soft, non-tender;no masses, no organomegaly  Skin:  LLE erythema   Neurologic:Cranial nerves grossly intact. No focal motor or sensory deficits .     Recent Laboratory Data-   Lab Results   Component Value Date    WBC 8.0 09/10/2021    HGB 11.0 (L) 09/10/2021    HCT 32.3 (L) 09/10/2021    MCV 91.8 09/10/2021     (H) 09/10/2021    LYMPHOPCT 27.4 09/10/2021    RBC 3.52 (L) 09/10/2021    MCH 31.3 09/10/2021    MCHC 34.1 09/10/2021    RDW 15.8 (H) 09/10/2021    NEUTOPHILPCT 54.5 09/10/2021    MONOPCT 10.6 09/10/2021    BASOPCT 1.0 09/10/2021    NEUTROABS 4.36 09/10/2021    LYMPHSABS 2.19 09/10/2021    MONOSABS 0.85 09/10/2021    EOSABS 0.48 09/10/2021    BASOSABS 0.08 09/10/2021       Lab Results   Component Value Date     09/10/2021    K 3.4 (L) 09/10/2021     09/10/2021    CO2 24 09/10/2021    BUN 12 09/10/2021    CREATININE 1.0 09/10/2021    GLUCOSE 87 09/10/2021    CALCIUM 8.5 (L) 09/10/2021    PROT 6.1 (L) 09/10/2021    LABALBU 3.6 09/10/2021    BILITOT 0.3 09/10/2021    ALKPHOS 115 09/10/2021    AST 27 09/10/2021    ALT 24 09/10/2021    LABGLOM >60 09/10/2021    GFRAA >60 09/10/2021       Lab Results   Component Value Date    IRON 24 (L) 08/01/2020    TIBC 145 (L) 08/01/2020    FERRITIN 539 10/10/2020           Radiology-    Echo Complete    Result Date: 9/5/2021  Transthoracic Echocardiography Report (TTE)  Demographics   Patient Name       Sarita Shay  Gender              Male                     II   Medical Record     [id number]         Room Number         0199  Number   Account #          [de-identified]        Procedure Date      09/05/2021   Corporate ID                        Ordering Physician   Accession Number   [id number]       Referring Physician   Date of Birth      1975       Sonographer         Kavon Cabral RDCS   Age                50 year(s)       Interpreting        Jennifer Aguilar MD                                      Physician                                       Any Other  Procedure Type of Study   TTE procedure:Echo Complete W/Doppler & Color Flow. Procedure Date Date: 09/05/2021 Start: 09:06 AM Study Location: Portable Technical Quality: Adequate visualization Indications:R/O Endocarditis. Patient Status: Routine Height: 74 inches Weight: 290 pounds BSA: 2.54 m^2 BMI: 37.23 kg/m^2 HR: 88 bpm BP: 104/55 mmHg Allergies   - Other drug:Severity - Moderate;Sensitivity - Allergy(tramadol     Seasional). Findings   Left Ventricle  Normal left ventricular size and systolic function. Ejection fraction is visually estimated at 60-65%. Normal diastolic function. No regional wall motion abnormalities seen. Normal left ventricular wall thickness. Right Ventricle  Normal right ventricular size and function.    Left Atrium Normal sized left atrium. The interatrial septum appears intact. Right Atrium  Normal right atrial size. Mitral Valve  Structurally normal mitral valve. No evidence of mitral valve stenosis. Physiologic mitral regurgitation. No vegetation. Tricuspid Valve  The tricuspid valve appears structurally normal.  Physiologic and/or trace tricuspid regurgitation. RVSP is 18 mmHg. No vegetation. Aortic Valve  Individual aortic valve leaflets are not clearly visualized. No hemodynamically significant aortic stenosis is present. No evidence of aortic valve regurgitation. No vegetation. Pulmonic Valve  The pulmonic valve was not well visualized. No evidence of pulmonic valve stenosis. No evidence of any pulmonic regurgitation. Pericardial Effusion  No evidence of pericardial effusion. Aorta  Aortic root dimension within normal limits. Miscellaneous  Normal Inferior Vena Cava diameter and respiratory variation. Conclusions   Summary  Normal left ventricular size and systolic function. Ejection fraction is visually estimated at 60-65%. Normal diastolic function. No regional wall motion abnormalities seen. Normal left ventricular wall thickness. Normal right ventricular size and function. No significant valvular abnormalities. No valvular vegetations seen. No echocardiographic evidence of endocarditis. Consider ANJANA, if appropriate, if clinical suspicion remains high.    Signature   ----------------------------------------------------------------  Electronically signed by Esther Walton MD(Interpreting  physician) on 09/05/2021 01:08 PM  ----------------------------------------------------------------  M-Mode/2D Measurements & Calculations   LV Diastolic    LV Systolic Dimension: 2.6   AV Cusp Separation: 2.1 cmLA  Dimension: 4.7  cm                           Dimension: 3.7 cmAO Root  cm              LV Volume Diastolic: 785.2   Dimension: 3 cm  LV FS:44.7 %    ml  LV PW           LV Volume Systolic: 25 ml  Diastolic: 1 cm LV EDV/LV EDV Index: 101.3  Septum          ml/40 ml/m^2LV ESV/LV ESV    RV Diastolic Dimension: 2.6  Diastolic: 1.1  Index: 25 XH/22RC/ m^2       cm  cm              EF Calculated: 75.3 %  CO: 12.06 l/min LV Mass Index: 69 l/min*m^2  Ascending Aorta: 2.7 cm  CI: 4.75                                     LA volume/Index: 60.9 ml  l/m*m^2                                      /23.89ml/m^2  LV Mass: 175.83 LVOT: 2.3 cm                 RA Area: 17.5 cm^2  g  Doppler Measurements & Calculations   MV Peak E-Wave:     AV Peak Velocity:     LVOT Peak Velocity: 1.63 m/s  1.32 m/s            1.76 m/s              LVOT Mean Velocity: 1.18 m/s  MV Peak A-Wave: 1.1 AV Peak Gradient:     LVOT Peak Gradient: 10.6  m/s                 12.39 mmHg            mmHgLVOT Mean Gradient: 6.2 mmHg  MV E/A Ratio: 1.2   AV Mean Velocity:     Estimated RVSP: 18.5 mmHg  MV Peak Gradient:   1.32 m/s              Estimated RAP:3 mmHg  11 mmHg             AV Mean Gradient: 7.4  MV Mean Gradient:   mmHg  5.1 mmHg            AV VTI: 35.5 cm       TR Velocity:1.97 m/s  MV Mean Velocity:   AV Area               TR Gradient:15.46 mmHg  1.09 m/s            (Continuity):3.86     PV Peak Velocity: 1.37 m/s  MV Deceleration     cm^2                  PV Peak Gradient: 7.46 mmHg  Time: 203 msec                            PV Mean Velocity: 0.98 m/s  MV P1/2t: 43.7 msec LVOT VTI: 33 cm       PV Mean Gradient: 4.2 mmHg  MVA by PHT:5.03  cm^2                Estimated PASP: 18.46  MV Area             mmHg  (continuity): 3.9  cm^2  MV E' Septal  Velocity: 0.09 m/s  MV E' Lateral  Velocity: 10 m/s  [email or web address] [id number]    XR CHEST (2 VW)    Result Date: 8/30/2021  EXAMINATION: TWO XRAY VIEWS OF THE CHEST 8/30/2021 10:47 pm COMPARISON: Chest series from April 23, 2021 HISTORY: ORDERING SYSTEM PROVIDED HISTORY: leg swelling TECHNOLOGIST PROVIDED HISTORY: Reason for exam:->leg swelling What reading provider will be dictating this exam?->CRC FINDINGS: Adequate and symmetric aeration of the lungs. There are no formed consolidations, pleural effusions, or pneumothoraces. Trachea and central mainstem bronchi appear clear. The cardiomediastinal silhouette and pulmonary vascularity appear within normal limits. Osseous and thoracic soft tissue structures demonstrate no acute findings. Multilevel mild endplate spondylosis in the thoracic spine     No evidence of active cardiopulmonary pathology. XR HUMERUS RIGHT (MIN 2 VIEWS)    Result Date: 9/8/2021  EXAMINATION: TWO XRAY VIEWS OF THE RIGHT HUMERUS 9/8/2021 4:20 am COMPARISON: 07/22/2019 HISTORY: ORDERING SYSTEM PROVIDED HISTORY: Patient accidentally tore his PICC line, is concerned that there is a small portion of the PICC line embedded in his vein TECHNOLOGIST PROVIDED HISTORY: Reason for exam:->Patient accidentally tore his PICC line, is concerned that there is a small portion of the PICC line embedded in his vein What reading provider will be dictating this exam?->CRC FINDINGS: No radiopaque foreign body. Specifically, there is no obvious radiopaque catheter identified in the visualized upper extremity. Chest was not included in the field of view. There is some lateral downsloping of the acromion which is unchanged from previous but could increase risk for impingement. No bony destruction, dislocation or acute fracture identified. No radiopaque foreign body or acute bony abnormality.      XR HIP LEFT (2-3 VIEWS)    Result Date: 9/3/2021  EXAMINATION: TWO XRAY VIEWS OF THE LEFT HIP 9/3/2021 6:41 pm COMPARISON: Pelvis/left hip series from July 28, 2016 HISTORY: ORDERING SYSTEM PROVIDED HISTORY: Pain TECHNOLOGIST PROVIDED HISTORY: Reason for exam:->Pain What reading provider will be dictating this exam?->CRC FINDINGS: There is a new vascular stent overlying the left inguinal region. Pubic symphysis appears congruent. Normal appearance of the superior and inferior pubic rami on the left. Left SI joint appears open. Patient has undergone previous total left hip arthroplasty. No evident hardware complications. There are no cortical irregularities along the course of the imaged left femur. Postsurgical changes to the left hip. No complicating features. No acute osseous findings. NM BONE SCAN 3 PHASE    Result Date: 2021  Patient MRN: [id number] : 1975 Age:  50 years Gender: Male Order Date: 2021 1:07 PM Exam: NM BONE SCAN 3 PHASE Number of Images: 9 views Indication:   left hip arthroplasty infection left hip arthroplasty infection What reading provider will be dictating this exam?->[facility] Comparison: X-ray dated 9/3/2021 Findings: Bone scan was performed following the injection of 28 mCi of MDP. Three-phase imaging of the right left hip were performed. Arterial flow demonstrates no increased uptake blood pool demonstrates no increased uptake delayed images demonstrate slight increased uptake involving the left hip a nonspecific finding. Proximally the level the trochanter when compared to the previous x-ray there is no corresponding finding on the radiograph Tracer uptake is seen compatible with degenerative changes was noted in the shoulders sternoclavicular joints and the ankles and feet. There is no convincing evidence for metastatic disease The remaining biodistribution of radiotracer appears to be within normal limits     Findings compatible with degenerative changes No convincing evidence for osteomyelitis of the left or right hip.  Mild tracer uptake on the left at the level of the greater trochanter a nonspecific finding    XR CHEST PORTABLE    Result Date: 2021  EXAMINATION: ONE XRAY VIEW OF THE CHEST 2021 6:07 am COMPARISON: 2021 HISTORY: ORDERING SYSTEM PROVIDED HISTORY: Dislodged PICC line in the right upper extremity TECHNOLOGIST PROVIDED HISTORY: Reason for exam:->Dislodged PICC line in the right upper extremity What reading provider will be dictating this exam?->CRC FINDINGS: Cardiomediastinal silhouette is unremarkable. No infiltrate, effusion, or pneumothorax is seen. No acute osseous abnormality is seen. No radiopaque foreign body is identified. No radiographic evidence of acute abnormality     CTA PULMONARY W CONTRAST    Result Date: 8/31/2021  EXAMINATION: CTA OF THE CHEST 8/31/2021 1:15 pm TECHNIQUE: CTA of the chest was performed after the administration of intravenous contrast.  Multiplanar reformatted images are provided for review. MIP images are provided for review. Dose modulation, iterative reconstruction, and/or weight based adjustment of the mA/kV was utilized to reduce the radiation dose to as low as reasonably achievable. COMPARISON: 11/18/2020 HISTORY: ORDERING SYSTEM PROVIDED HISTORY: dvt, r/o PE TECHNOLOGIST PROVIDED HISTORY: Reason for exam:->dvt, r/o PE Decision Support Exception - unselect if not a suspected or confirmed emergency medical condition->Emergency Medical Condition (MA) FINDINGS: Pulmonary Arteries: Pulmonary arteries are adequately opacified for evaluation. No evidence of intraluminal filling defect to suggest pulmonary embolism. Main pulmonary artery is normal in caliber. Mediastinum: No evidence of mediastinal lymphadenopathy. The heart and pericardium demonstrate no acute abnormality. There is no acute abnormality of the thoracic aorta. Lungs/pleura: The lungs are without acute process. No focal consolidation or pulmonary edema. No evidence of pleural effusion or pneumothorax. Upper Abdomen: Limited images of the upper abdomen are unremarkable. Soft Tissues/Bones: No acute bone or soft tissue abnormality. There are some anterior spurs in the thoracic spine     No evidence of pulmonary embolism or acute pulmonary abnormality.      US DUP UPPER EXTREMITY RIGHT VENOUS    Result Date: 2021  Patient MRN:  [id number] : 1975 Age: 50 years Gender: Male Order Date:  2021 11:33 AM EXAM: US DUP UPPER EXTREMITY RIGHT VENOUS NUMBER OF IMAGES:  43 INDICATION:  ARM DVT SUSPECTED Pain, swelling, picc line broke, concern for DVT What reading provider will be dictating this exam?->[facility] COMPARISON: None There is evidence for deep venous thrombosis which is nonocclusive in the right mid basilic vein There is otherwise good compressibility, there is good augmentation, there is good color flow. There is evidence for deep venous thrombosis ALERT:  THIS IS AN ABNORMAL REPORT     US DUP LOWER EXTREMITY LEFT JORGE    Result Date: 2021  Patient MRN:  [id number] : 1975 Age: 50 years Gender: Male Order Date:  2021 10:29 PM EXAM: US DUP LOWER EXTREMITY LEFT JORGE NUMBER OF IMAGES:  24 INDICATION:  Discomfort Discomfort What reading provider will be dictating this exam?->[facility] COMPARISON: 2020 There is evidence for deep venous thrombosis of the left iliac vein and the left common femoral vein There is otherwise good compressibility, there is good augmentation, there is good color flow. There is evidence for deep venous thrombosis, when compared to previous there was nonocclusive thrombus seen in this region of the left iliac vein and the common femoral vein. The thrombus is now occlusive and appears worse.  ALERT:  THIS IS AN ABNORMAL REPORT         ASSESSMENT/PLAN :  78-year-old male   -Thrombocytosis on Hydrea 500 mg twice daily, aspirin 81 mg  - Chronic BL LE DVT on Coumadin and IVC filter.   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      -Chest XR negative.   -R humerus XR

## 2021-09-10 NOTE — DISCHARGE SUMMARY
Hospital Medicine Discharge Summary    Patient ID: Lily Ross      Patient's PCP: Swathi Lomeli, APRN - CNP    Admit Date: 9/8/2021     Discharge Date:    9/10/2021      Admitting Physician: Lauren Kimball DO     Discharge Physician: Jessa Kowalski MD     Discharge Diagnoses:  #SIMÓN  #Staphylococcus warneri bacteremia  #LLE cellulitis  #RUE pain secondary to DVT       Active Hospital Problems    Diagnosis Date Noted    SIMÓN (acute kidney injury) [facility]) [N17.9] 09/08/2021       The patient was seen and examined on day of discharge and this discharge summary is in conjunction with any daily progress note from day of discharge. History of present illness:  Mr. Lily Ross is a 50y.o. year old male  who  has a past medical history of Accident, Acute thrombosis of inferior vena cava ([address] SIMÓN (acute kidney injury) ([address] Allergic rhinitis, Blister of left leg, Cellulitis of leg, left, Chronic back pain, Depression, Dermatophytosis, Diabetes mellitus ([address] Difficulty sleeping, Displacement of lumbar intervertebral disc without myelopathy, Fibromyalgia, Fractured rib, H/O seasonal allergies, Head injury, History of deep vein thrombosis, Hyperlipidemia, Hypertension, 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[address] Lymphedema of left leg, Obesity (BMI 35.0-39.9 without comorbidity), Osteoarthritis, Recurrent acute deep vein thrombosis (DVT) of left lower extremity ([address] S/P IVC filter, Subtherapeutic international normalized ratio (INR), and Thoracic or lumbosacral neuritis or radiculitis, unspecified. He was recently admitted from 8/31-9/5/2021 with left lower extremity cellulitis, bacteremia and occlusive DVT of the left iliac and common femoral vein. He was sent home with daptomycin and Coumadin. He presented to the ER on 9/8/2021 after accidentally rolling over in bed and pulling his PICC line out. Patient reported that the PICC line got caught on his blanket and that it ripped.   He believes that with full range of motion. No jugular venous distention. Trachea midline. Respiratory:  Clear to auscultation bilaterally. No apparent distress on room air. Cardiovascular:  Regular rate and rhythm. S1, S2 without murmurs, rubs, or gallops. PV: Brisk capillary refill. +2 pedal and radial pulses bilaterally. No clubbing, cyanosis, edema of bilateral lower extremities. Abdomen: Soft, obese, non-tender, non-distended. +BS  Musculoskeletal: No obvious deformities or erythematous or edematous joints. Skin: Normal skin color. LLE erythema, abrasion. RUE bruising  Neurologic:  Neurovascularly intact without any focal sensory/motor deficits. Psychiatric: Calm and cooperative        Consults:     IP CONSULT TO PRIMARY CARE PROVIDER  IP CONSULT TO IV TEAM  IP CONSULT TO INFECTIOUS DISEASES  IP CONSULT TO PHARMACY  IP CONSULT TO HEM/ONC    Disposition: Home with home health    Discharge Instructions/Follow-up: PCP and infectious disease    Code Status:  Full Code     Activity: activity as tolerated    Diet: cardiac diet    Labs: For convenience and continuity at follow-up the following most recent labs are provided:      CBC:    Lab Results   Component Value Date    WBC 8.0 09/10/2021    HGB 11.0 09/10/2021    HCT 32.3 09/10/2021     09/10/2021       Renal:    Lab Results   Component Value Date     09/10/2021    K 3.4 09/10/2021    K 3.4 09/09/2021     09/10/2021    CO2 24 09/10/2021    BUN 12 09/10/2021    CREATININE 1.0 09/10/2021    CALCIUM 8.5 09/10/2021    PHOS 2.9 10/10/2020       Discharge Medications:     Current Discharge Medication List             Details   oxyCODONE-acetaminophen (PERCOCET) 5-325 MG per tablet Take 1 tablet by mouth every 6 hours as needed for Pain for up to 3 days. Intended supply: 3 days.  Take lowest dose possible to manage pain  Qty: 12 tablet, Refills: 0    Comments: Reduce doses taken as pain becomes manageable  Associated Diagnoses: Cellulitis of left lower Diagnoses: Mixed hyperlipidemia       !! - Potential duplicate medications found. Please discuss with provider. Time Spent on discharge is more than 45 minutes in the examination, evaluation, counseling and review of medications and discharge plan. Signed:     Yaya Huynh MD   9/10/2021

## 2021-09-13 ENCOUNTER — TELEPHONE (OUTPATIENT)
Dept: FAMILY MEDICINE CLINIC | Age: 46
End: 2021-09-13

## 2021-09-13 NOTE — TELEPHONE ENCOUNTER
[facility] Transitions Initial Follow Up Call    Outreach made within 2 business days of discharge: Yes    Patient: Lm Chavez II Patient : 1975   MRN: [id number]  Reason for Admission: There are no discharge diagnoses documented for the most recent discharge. Discharge Date: 9/10/21       Spoke with: Unable to reach,Left voice message    Discharge department/facility: [address] [place] [address]        Scheduled appointment with PCP within 7-14 days    Follow Up  No future appointments.     Johnny Sosa MA

## 2021-09-14 ENCOUNTER — TELEPHONE (OUTPATIENT)
Dept: FAMILY MEDICINE CLINIC | Age: 46
End: 2021-09-14

## 2021-09-14 RX ORDER — WARFARIN SODIUM 2.5 MG/1
TABLET ORAL
Qty: 90 TABLET | Refills: 1 | Status: CANCELLED | OUTPATIENT
Start: 2021-09-14

## 2021-09-14 RX ORDER — WARFARIN SODIUM 2.5 MG/1
TABLET ORAL
Qty: 90 TABLET | Refills: 1 | Status: SHIPPED
Start: 2021-09-14 | End: 2022-04-07

## 2021-09-15 ENCOUNTER — CLINICAL DOCUMENTATION (OUTPATIENT)
Dept: INFECTIOUS DISEASES | Age: 46
End: 2021-09-15

## 2021-09-15 NOTE — PROGRESS NOTES
ID Note  Called patient- he is currently not taking Pravastatin- he was instructed to not take it while on Daptomycin. He reports leg swelling but no cramping no pain. He is currently on Daptomycin   Orders given to [facility] - to repeat labs CMP and CK total today   Patient to follow-up with ID tomorrow in office for further evaluation. Patient given appt time and location.      IGOR Bob - CNS  9/15/2021

## 2021-09-17 ENCOUNTER — TELEPHONE (OUTPATIENT)
Dept: FAMILY MEDICINE CLINIC | Age: 46
End: 2021-09-17

## 2021-09-17 NOTE — TELEPHONE ENCOUNTER
Nurse came to take blood from [address] [place] [address] - Pt BP was elevated on multiple attempts:   180/100  180/104    180/100  Pt takes meds at night and says his pain level is at 9 and is out of pain meds.  Please Advise

## 2021-09-20 ENCOUNTER — OFFICE VISIT (OUTPATIENT)
Dept: FAMILY MEDICINE CLINIC | Age: 46
End: 2021-09-20
Payer: MEDICARE

## 2021-09-20 VITALS
OXYGEN SATURATION: 99 % | HEART RATE: 84 BPM | RESPIRATION RATE: 18 BRPM | BODY MASS INDEX: 39.63 KG/M2 | HEIGHT: 73 IN | WEIGHT: 299 LBS | TEMPERATURE: 97.9 F | SYSTOLIC BLOOD PRESSURE: 138 MMHG | DIASTOLIC BLOOD PRESSURE: 86 MMHG

## 2021-09-20 DIAGNOSIS — E11.65 TYPE 2 DIABETES MELLITUS WITH HYPERGLYCEMIA, WITHOUT LONG-TERM CURRENT USE OF INSULIN (HCC): Primary | ICD-10-CM

## 2021-09-20 DIAGNOSIS — L03.116 CELLULITIS OF LEFT LOWER EXTREMITY: ICD-10-CM

## 2021-09-20 DIAGNOSIS — Z86.39 HISTORY OF HYPOKALEMIA: ICD-10-CM

## 2021-09-20 DIAGNOSIS — I10 ESSENTIAL HYPERTENSION: ICD-10-CM

## 2021-09-20 DIAGNOSIS — N17.9 ACUTE KIDNEY INJURY (HCC): ICD-10-CM

## 2021-09-20 DIAGNOSIS — E78.2 MIXED HYPERLIPIDEMIA: ICD-10-CM

## 2021-09-20 LAB — HBA1C MFR BLD: 6.2 %

## 2021-09-20 PROCEDURE — 99214 OFFICE O/P EST MOD 30 MIN: CPT | Performed by: NURSE PRACTITIONER

## 2021-09-20 PROCEDURE — 83036 HEMOGLOBIN GLYCOSYLATED A1C: CPT | Performed by: NURSE PRACTITIONER

## 2021-09-20 PROCEDURE — 1111F DSCHRG MED/CURRENT MED MERGE: CPT | Performed by: NURSE PRACTITIONER

## 2021-09-20 RX ORDER — BLOOD SUGAR DIAGNOSTIC
1 STRIP MISCELLANEOUS DAILY
Qty: 100 EACH | Refills: 3 | Status: SHIPPED
Start: 2021-09-20 | End: 2022-02-14 | Stop reason: SDUPTHER

## 2021-09-20 RX ORDER — METOPROLOL SUCCINATE 25 MG/1
25 TABLET, EXTENDED RELEASE ORAL DAILY
Qty: 30 TABLET | Refills: 5 | Status: SHIPPED
Start: 2021-09-20 | End: 2022-02-14 | Stop reason: SDUPTHER

## 2021-09-20 RX ORDER — POTASSIUM CHLORIDE 20 MEQ/1
20 TABLET, EXTENDED RELEASE ORAL 2 TIMES DAILY WITH MEALS
Qty: 60 TABLET | Refills: 3 | Status: CANCELLED | OUTPATIENT
Start: 2021-09-20

## 2021-09-20 RX ORDER — INSULIN GLARGINE 100 [IU]/ML
25 INJECTION, SOLUTION SUBCUTANEOUS NIGHTLY
Qty: 10 ML | Refills: 5 | Status: SHIPPED
Start: 2021-09-20 | End: 2022-02-14 | Stop reason: SDUPTHER

## 2021-09-20 SDOH — ECONOMIC STABILITY: FOOD INSECURITY: WITHIN THE PAST 12 MONTHS, THE FOOD YOU BOUGHT JUST DIDN'T LAST AND YOU DIDN'T HAVE MONEY TO GET MORE.: NEVER TRUE

## 2021-09-20 SDOH — ECONOMIC STABILITY: FOOD INSECURITY: WITHIN THE PAST 12 MONTHS, YOU WORRIED THAT YOUR FOOD WOULD RUN OUT BEFORE YOU GOT MONEY TO BUY MORE.: NEVER TRUE

## 2021-09-20 ASSESSMENT — ENCOUNTER SYMPTOMS
SHORTNESS OF BREATH: 0
RHINORRHEA: 0
ABDOMINAL PAIN: 0
WHEEZING: 0
CHOKING: 0
RECTAL PAIN: 0
SINUS PRESSURE: 0
EYE ITCHING: 0
ANAL BLEEDING: 0
VOICE CHANGE: 0
EYE REDNESS: 0
EYE DISCHARGE: 0
SINUS PAIN: 0
BLOOD IN STOOL: 0
FACIAL SWELLING: 0
CONSTIPATION: 0
SORE THROAT: 0
VOMITING: 0
NAUSEA: 0
TROUBLE SWALLOWING: 0
COUGH: 0
ABDOMINAL DISTENTION: 0
DIARRHEA: 0
COLOR CHANGE: 0
APNEA: 0
EYE PAIN: 0
STRIDOR: 0
PHOTOPHOBIA: 0
CHEST TIGHTNESS: 0

## 2021-09-20 ASSESSMENT — SOCIAL DETERMINANTS OF HEALTH (SDOH): HOW HARD IS IT FOR YOU TO PAY FOR THE VERY BASICS LIKE FOOD, HOUSING, MEDICAL CARE, AND HEATING?: NOT HARD AT ALL

## 2021-09-20 NOTE — PROGRESS NOTES
Post-Discharge Transitional Care Management Services or Hospital Follow Up      Greg Simpler II   YOB: 1975    Date of Office Visit:  9/20/2021  Date of Hospital Admission: 9/8/21  Date of Hospital Discharge: 9/10/21  Risk of hospital readmission (high >=14%.  Medium >=10%) :Readmission Risk Score: 26      Care management risk score Rising risk (score 2-5) and Complex Care (Scores >=6): 9     Non face to face  following discharge, date last encounter closed (first attempt may have been earlier): 9/14/2021  3:55 PM    Call initiated 2 business days of discharge: Yes    Patient Active Problem List   Diagnosis    Neuropathic pain    Lumbar spondylosis    Osteoarthritis    Insomnia    Fibromyalgia    Vitamin D deficiency    Essential hypertension    Allergic rhinitis    Lumbar radiculopathy    Osteoarthritis of spine with radiculopathy, lumbar region    Class 1 obesity in adult    Lumbar disc herniation    Type 2 diabetes mellitus ([address]    Primary osteoarthritis of left hip    Primary osteoarthritis of right hip    Neuropathy    Left leg swelling    Rectus diastasis    Recurrent unilateral inguinal hernia    Abnormal findings on diagnostic imaging of gall bladder    Cyst of spleen    Other pulmonary embolism without acute cor pulmonale (HCC)    Thrombocytosis (HCC)    Acute blood loss anemia    Acute deep vein thrombosis (DVT) (HCC)    Other hyperlipidemia    Tobacco dependence    Anticoagulated    Cellulitis of left lower extremity    Dermatophytosis    Lymphedema of left leg    Blister of left leg    Recurrent acute deep vein thrombosis (DVT) of left lower extremity (HCC)    S/P IVC filter    History of deep vein thrombosis    Subtherapeutic international normalized ratio (INR)    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HCC)    SIMÓN (acute kidney injury) (HCC)       Allergies   Allergen Reactions    Seasonal      HAYFEVER / sneezing,watery eyes    Tramadol Itching Medications listed as ordered at the time of discharge from [address] Medication Instructions ANTOINE:    Printed on:09/20/21 1025   Medication Information                      cyclobenzaprine (FLEXERIL) 10 MG tablet  Take 10 mg by mouth three times daily             daptomycin (CUBICIN) infusion  Infuse 611.4 mg intravenously every 24 hours Stop on 10/14/2021             DULoxetine (CYMBALTA) 30 MG extended release capsule  Take 1 capsule by mouth daily             fenofibrate (TRICOR) 54 MG tablet  Take 54 mg by mouth daily             ferrous sulfate (IRON 325) 325 (65 Fe) MG tablet  Take 325 mg by mouth daily (with breakfast)              hydroxyurea (HYDREA) 500 MG chemo capsule  Take 500 mg by mouth 2 times daily             insulin glargine (LANTUS) 100 UNIT/ML injection vial  Inject 25 Units into the skin nightly             insulin lispro (HUMALOG) 100 UNIT/ML injection vial  Inject 0-6 Units into the skin 3 times daily (before meals) *Per Sliding Scale*             Insulin Syringe-Needle U-100 30G X 5/16\" 1 ML MISC  1 each by Does not apply route daily Use as directed             lisinopril (PRINIVIL;ZESTRIL) 5 MG tablet  Take 1 tablet by mouth daily             loperamide (IMODIUM) 2 MG capsule  Take 1 capsule by mouth 4 times daily as needed for Diarrhea             melatonin 3 MG TABS tablet  Take 3 mg by mouth nightly as needed (sleep)             metoprolol succinate (TOPROL XL) 25 MG extended release tablet  Take 1 tablet by mouth daily             pantoprazole (PROTONIX) 40 MG tablet  Take 40 mg by mouth daily             potassium chloride (KLOR-CON M) 20 MEQ extended release tablet  Take 1 tablet by mouth 2 times daily (with meals)             pravastatin (PRAVACHOL) 20 MG tablet  Take 1 tablet by mouth nightly             warfarin (COUMADIN) 2.5 MG tablet  Take as directed             warfarin (COUMADIN) 5 MG tablet  Take 5 mg by mouth every evening Given with Warfarin 2.5 mg total dose Warfarin 7.5 mg daily                   Medications marked \"taking\" at this time  Outpatient Medications Marked as Taking for the 9/20/21 encounter (Office Visit) with IGOR Thomas CNP   Medication Sig Dispense Refill    warfarin (COUMADIN) 2.5 MG tablet Take as directed 90 tablet 1    Insulin Syringe-Needle U-100 30G X 5/16\" 1 ML MISC 1 each by Does not apply route daily Use as directed 100 each 1    daptomycin (CUBICIN) infusion Infuse 611.4 mg intravenously every 24 hours Stop on 10/14/2021 8.5 g 0    warfarin (COUMADIN) 5 MG tablet Take 5 mg by mouth every evening Given with Warfarin 2.5 mg total dose Warfarin 7.5 mg daily      insulin glargine (LANTUS) 100 UNIT/ML injection vial Inject 25 Units into the skin nightly      insulin lispro (HUMALOG) 100 UNIT/ML injection vial Inject 0-6 Units into the skin 3 times daily (before meals) *Per Sliding Scale*      lisinopril (PRINIVIL;ZESTRIL) 5 MG tablet Take 1 tablet by mouth daily 90 tablet 1    loperamide (IMODIUM) 2 MG capsule Take 1 capsule by mouth 4 times daily as needed for Diarrhea 120 capsule 3    potassium chloride (KLOR-CON M) 20 MEQ extended release tablet Take 1 tablet by mouth 2 times daily (with meals) 60 tablet 3    metoprolol succinate (TOPROL XL) 25 MG extended release tablet Take 1 tablet by mouth daily 30 tablet 0    melatonin 3 MG TABS tablet Take 3 mg by mouth nightly as needed (sleep)      cyclobenzaprine (FLEXERIL) 10 MG tablet Take 10 mg by mouth three times daily      fenofibrate (TRICOR) 54 MG tablet Take 54 mg by mouth daily      ferrous sulfate (IRON 325) 325 (65 Fe) MG tablet Take 325 mg by mouth daily (with breakfast)       hydroxyurea (HYDREA) 500 MG chemo capsule Take 500 mg by mouth 2 times daily      pantoprazole (PROTONIX) 40 MG tablet Take 40 mg by mouth daily      DULoxetine (CYMBALTA) 30 MG extended release capsule Take 1 capsule by mouth daily 90 capsule 1    pravastatin (PRAVACHOL) 20 MG tablet Take 1 tablet by mouth nightly 90 tablet 1        Medications patient taking as of now reconciled against medications ordered at time of hospital discharge: Yes    Chief Complaint   Patient presents with    Care Management     TCM- SIMÓN/LLE Cellulitis    Diabetes    Hypertension    Hyperlipidemia       History of Present illness - Follow up of Hospital diagnosis(es):SIMÓN and LLE Cellulitis. Pt also will need f/u for DM/HTN/Hyperlipidemia, Pt is receiving IV infusions w/Daptomycin for LLE Cellulitis once daily. [facility]/DVT, most recent INR 2.4 on 9/10/21        Treatment Adherence:   Medication compliance:  pt only taking Lantus nightly  Diet compliance:  compliant most of the time  Weight trend: stable  Current exercise: no regular exercise  Barriers: none    Diabetes Mellitus Type 2: Current symptoms/problems include none. Home blood sugar records: trend: stable  Any episodes of hypoglycemia? no  Eye exam current (within one year): no  Tobacco history: He  reports that he has never smoked. His smokeless tobacco use includes chew. Daily Aspirin? No: on Coumadin  A1C today is 6.2%- no change in treatment plan    Hypertension:  Home blood pressure monitoring: Yes - Home Health checking BP prior to administering IV Infusins. He is adherent to a low sodium diet. Patient denies chest pain, shortness of breath, lightheadedness, blurred vision, palpitations and dry cough. Antihypertensive medication side effects: no medication side effects noted. Use of agents associated with hypertension: none. Pt restarted Metoprolol a few days ago,     Hyperlipidemia:  No new myalgias or GI upset on pravastatin (Pravachol).    Pt stopped taking Pravachol and Tricor several months ago, pt is due for repeat Lipid Panel today    Lab Results   Component Value Date    LABA1C 6.2 09/20/2021    LABA1C 6.5 03/29/2021    LABA1C 5.1 11/19/2020     Lab Results   Component Value Date LABMICR <12.0 08/12/2016    CREATININE 0.8 09/15/2021     Lab Results   Component Value Date    ALT 25 09/15/2021    AST 28 09/15/2021     Lab Results   Component Value Date    CHOL 154 03/29/2021    TRIG 202 (H) 03/29/2021    HDL 38 03/29/2021    LDLCALC 76 03/29/2021            Pt is due for Preventative Labs today, will recheck Potassium level d/t h/o Hypokalemia        Inpatient course: Discharge summary reviewed- see chart. Interval history/Current status: stable/home    Review of Systems   Constitutional: Negative for activity change, appetite change, chills, diaphoresis, fatigue, fever and unexpected weight change. HENT: Negative for congestion, ear discharge, ear pain, facial swelling, hearing loss, mouth sores, nosebleeds, postnasal drip, rhinorrhea, sinus pressure, sinus pain, sneezing, sore throat, tinnitus, trouble swallowing and voice change. Eyes: Negative for photophobia, pain, discharge, redness, itching and visual disturbance. Respiratory: Negative for apnea, cough, choking, chest tightness, shortness of breath, wheezing and stridor. Immunized for Covid 19, Chronic PE   Cardiovascular: Positive for leg swelling (Chronic -d/t BLE DVT/Cellulitis). Negative for chest pain and palpitations. HTN/Hyperlipidemia   Gastrointestinal: Negative for abdominal distention, abdominal pain, anal bleeding, blood in stool, constipation, diarrhea, nausea, rectal pain and vomiting. GI bleeding status post right hemicolectomy, cholecystectomy, small bowel resection, ileocolonic anastomosis, splenectomy and omentectomy   Endocrine: Negative for cold intolerance, heat intolerance, polydipsia, polyphagia and polyuria. DM   Genitourinary: Negative for decreased urine volume, difficulty urinating, dysuria, enuresis, flank pain, frequency, hematuria and urgency. H/o Acute Kidney Failure   Musculoskeletal: Negative for myalgias.         H/o Chronic Lumbar Radiculopathy/ Left Shoulder Pain-rotator cuff injury   Skin: Negative for color change, pallor and rash. LLE Cellulitis-IV antibiotic infusions   Neurological: Negative for dizziness, tremors, seizures, syncope, facial asymmetry, speech difficulty, weakness, light-headedness, numbness and headaches. H/o Neuropathic pain d/t lumbar radiculopathy   Hematological: Negative for adenopathy. Does not bruise/bleed easily. Hypokalemia   Psychiatric/Behavioral: Negative for dysphoric mood, self-injury and suicidal ideas. The patient is not nervous/anxious. H/o Insomnia       Vitals:    09/20/21 0940 09/20/21 0943   BP: (!) 165/107 (!) 147/86   Site: Left Lower Arm Left Lower Arm   Position: Sitting Sitting   Cuff Size: Medium Adult Medium Adult   Pulse: 84    Resp: 18    Temp: 97.9 °F (36.6 °C)    TempSrc: Temporal    SpO2: 99%    Weight: 299 lb (135.6 kg)    Height: 6' 1\" (1.854 m)      Body mass index is 39.45 kg/m².    Wt Readings from Last 3 Encounters:   09/20/21 299 lb (135.6 kg)   09/08/21 290 lb (131.5 kg)   08/31/21 290 lb (131.5 kg)     BP Readings from Last 3 Encounters:   09/20/21 (!) 147/86   09/10/21 (!) 157/88   09/05/21 127/61        Physical Exam:  General Appearance: alert and oriented to person, place and time, well-developed and well-nourished, in no acute distress  Skin: warm and dry, no rash or erythema  Head: normocephalic and atraumatic  Eyes: pupils equal, round, and reactive to light, extraocular eye movements intact, conjunctivae normal  ENT: tympanic membrane, external ear and ear canal normal bilaterally, oropharynx clear and moist with normal mucous membranes  Neck: neck supple and non tender without mass, no thyromegaly or thyroid nodules, no cervical lymphadenopathy   Pulmonary/Chest: clear to auscultation bilaterally- no wheezes, rales or rhonchi, normal air movement, no respiratory distress  Cardiovascular: normal rate, normal S1 and S2, no gallops, intact distal pulses and no carotid bruits  Abdomen: soft, non-tender, non-distended, normal bowel sounds, no masses or organomegaly  Extremities: Diffuse edema present to LLE-Thigh to foot. No redness or warmth present, multiple small sores w/scabs  Musculoskeletal: normal range of motion, no joint swelling, deformity or tenderness    Assessment/Plan:  1. Type 2 diabetes mellitus with hyperglycemia, without long-term current use of insulin ([facility])  A1C today is 6.2%-pt only taking Lantus-see HPI  - POCT glycosylated hemoglobin (Hb A1C)  - CBC Auto Differential; Future  - Comprehensive Metabolic Panel; Future  - Lipid Panel; Future  - TSH without Reflex; Future  - ID DISCHARGE MEDS RECONCILED W/ CURRENT OUTPATIENT MED LIST  - insulin glargine (LANTUS) 100 UNIT/ML injection vial; Inject 25 Units into the skin nightly  Dispense: 10 mL; Refill: 5  - blood glucose test strips (EVENCARE G2 TEST) strip; 1 each by In Vitro route daily As needed. Dispense: 100 each; Refill: 3    2. Essential hypertension-stable/controlled  Continue current treatment plan  - CBC Auto Differential; Future  - Comprehensive Metabolic Panel; Future  - Lipid Panel; Future  - TSH without Reflex; Future  - ID DISCHARGE MEDS RECONCILED W/ CURRENT OUTPATIENT MED LIST  - metoprolol succinate (TOPROL XL) 25 MG extended release tablet; Take 1 tablet by mouth daily  Dispense: 30 tablet; Refill: 5    3. Mixed hyperlipidemia  Pt stopped Pravachol-see HPI  Advised on Lifestyle Modifications  - CBC Auto Differential; Future  - Comprehensive Metabolic Panel; Future  - Lipid Panel; Future  - TSH without Reflex; Future  - ID DISCHARGE MEDS RECONCILED W/ CURRENT OUTPATIENT MED LIST    4. History of hypokalemia  CMP  - ID DISCHARGE MEDS RECONCILED W/ CURRENT OUTPATIENT MED LIST    5. Acute kidney injury ([facility] [address]    - ID DISCHARGE MEDS RECONCILED W/ CURRENT OUTPATIENT MED LIST    6.  Cellulitis of left lower extremity  Home IV Antibiotic Infusion  - ID DISCHARGE MEDS RECONCILED W/ CURRENT

## 2021-10-05 ENCOUNTER — APPOINTMENT (OUTPATIENT)
Dept: GENERAL RADIOLOGY | Age: 46
End: 2021-10-05
Payer: MEDICARE

## 2021-10-05 ENCOUNTER — APPOINTMENT (OUTPATIENT)
Dept: ULTRASOUND IMAGING | Age: 46
End: 2021-10-05
Payer: MEDICARE

## 2021-10-05 ENCOUNTER — HOSPITAL ENCOUNTER (EMERGENCY)
Age: 46
Discharge: HOME | End: 2021-10-05
Attending: EMERGENCY MEDICINE
Payer: MEDICARE

## 2021-10-05 VITALS
HEIGHT: 73 IN | BODY MASS INDEX: 39.63 KG/M2 | WEIGHT: 299 LBS | RESPIRATION RATE: 18 BRPM | SYSTOLIC BLOOD PRESSURE: 169 MMHG | DIASTOLIC BLOOD PRESSURE: 98 MMHG | TEMPERATURE: 98.2 F | HEART RATE: 110 BPM | OXYGEN SATURATION: 97 %

## 2021-10-05 DIAGNOSIS — M79.89 RIGHT LEG SWELLING: Primary | ICD-10-CM

## 2021-10-05 LAB
ALBUMIN SERPL-MCNC: 4 G/DL (ref 3.5–5.2)
ALP BLD-CCNC: 153 U/L (ref 40–129)
ALT SERPL-CCNC: 18 U/L (ref 0–40)
ANION GAP SERPL CALCULATED.3IONS-SCNC: 12 MMOL/L (ref 7–16)
AST SERPL-CCNC: 20 U/L (ref 0–39)
BASOPHILS ABSOLUTE: 0.1 E9/L (ref 0–0.2)
BASOPHILS RELATIVE PERCENT: 0.7 % (ref 0–2)
BILIRUB SERPL-MCNC: 0.4 MG/DL (ref 0–1.2)
BUN BLDV-MCNC: 14 MG/DL (ref 6–20)
CALCIUM SERPL-MCNC: 10 MG/DL (ref 8.6–10.2)
CHLORIDE BLD-SCNC: 96 MMOL/L (ref 98–107)
CO2: 27 MMOL/L (ref 22–29)
CREAT SERPL-MCNC: 1 MG/DL (ref 0.7–1.2)
EOSINOPHILS ABSOLUTE: 0.16 E9/L (ref 0.05–0.5)
EOSINOPHILS RELATIVE PERCENT: 1 % (ref 0–6)
GFR AFRICAN AMERICAN: >60
GFR NON-AFRICAN AMERICAN: >60 ML/MIN/1.73
GLUCOSE BLD-MCNC: 132 MG/DL (ref 74–99)
HCT VFR BLD CALC: 34.7 % (ref 37–54)
HEMOGLOBIN: 11.5 G/DL (ref 12.5–16.5)
IMMATURE GRANULOCYTES #: 0.09 E9/L
IMMATURE GRANULOCYTES %: 0.6 % (ref 0–5)
INR BLD: 6.2
LYMPHOCYTES ABSOLUTE: 1.49 E9/L (ref 1.5–4)
LYMPHOCYTES RELATIVE PERCENT: 9.8 % (ref 20–42)
MAGNESIUM: 1.7 MG/DL (ref 1.6–2.6)
MCH RBC QN AUTO: 30.3 PG (ref 26–35)
MCHC RBC AUTO-ENTMCNC: 33.1 % (ref 32–34.5)
MCV RBC AUTO: 91.3 FL (ref 80–99.9)
MONOCYTES ABSOLUTE: 1.5 E9/L (ref 0.1–0.95)
MONOCYTES RELATIVE PERCENT: 9.8 % (ref 2–12)
NEUTROPHILS ABSOLUTE: 11.92 E9/L (ref 1.8–7.3)
NEUTROPHILS RELATIVE PERCENT: 78.1 % (ref 43–80)
PDW BLD-RTO: 15 FL (ref 11.5–15)
PLATELET # BLD: 617 E9/L (ref 130–450)
PMV BLD AUTO: 9.8 FL (ref 7–12)
POTASSIUM SERPL-SCNC: 4.5 MMOL/L (ref 3.5–5)
PROTHROMBIN TIME: 67.6 SEC (ref 9.3–12.4)
RBC # BLD: 3.8 E12/L (ref 3.8–5.8)
SODIUM BLD-SCNC: 135 MMOL/L (ref 132–146)
TOTAL PROTEIN: 6.8 G/DL (ref 6.4–8.3)
WBC # BLD: 15.3 E9/L (ref 4.5–11.5)

## 2021-10-05 PROCEDURE — 36415 COLL VENOUS BLD VENIPUNCTURE: CPT

## 2021-10-05 PROCEDURE — 80053 COMPREHEN METABOLIC PANEL: CPT

## 2021-10-05 PROCEDURE — 6360000002 HC RX W HCPCS: Performed by: EMERGENCY MEDICINE

## 2021-10-05 PROCEDURE — 73590 X-RAY EXAM OF LOWER LEG: CPT

## 2021-10-05 PROCEDURE — 83735 ASSAY OF MAGNESIUM: CPT

## 2021-10-05 PROCEDURE — 93971 EXTREMITY STUDY: CPT

## 2021-10-05 PROCEDURE — 85610 PROTHROMBIN TIME: CPT

## 2021-10-05 PROCEDURE — 73552 X-RAY EXAM OF FEMUR 2/>: CPT

## 2021-10-05 PROCEDURE — 99283 EMERGENCY DEPT VISIT LOW MDM: CPT

## 2021-10-05 PROCEDURE — 85025 COMPLETE CBC W/AUTO DIFF WBC: CPT

## 2021-10-05 PROCEDURE — 96374 THER/PROPH/DIAG INJ IV PUSH: CPT

## 2021-10-05 PROCEDURE — 96375 TX/PRO/DX INJ NEW DRUG ADDON: CPT

## 2021-10-05 RX ORDER — MORPHINE SULFATE 4 MG/ML
4 INJECTION, SOLUTION INTRAMUSCULAR; INTRAVENOUS ONCE
Status: DISCONTINUED | OUTPATIENT
Start: 2021-10-05 | End: 2021-10-05 | Stop reason: HOSPADM

## 2021-10-05 RX ORDER — FENTANYL CITRATE 50 UG/ML
75 INJECTION, SOLUTION INTRAMUSCULAR; INTRAVENOUS ONCE
Status: COMPLETED | OUTPATIENT
Start: 2021-10-05 | End: 2021-10-05

## 2021-10-05 RX ORDER — ONDANSETRON 2 MG/ML
4 INJECTION INTRAMUSCULAR; INTRAVENOUS ONCE
Status: COMPLETED | OUTPATIENT
Start: 2021-10-05 | End: 2021-10-05

## 2021-10-05 RX ADMIN — ONDANSETRON 4 MG: 2 INJECTION INTRAMUSCULAR; INTRAVENOUS at 18:08

## 2021-10-05 RX ADMIN — FENTANYL CITRATE 75 MCG: 0.05 INJECTION, SOLUTION INTRAMUSCULAR; INTRAVENOUS at 18:07

## 2021-10-05 ASSESSMENT — PAIN SCALES - GENERAL: PAINLEVEL_OUTOF10: 10

## 2021-10-05 NOTE — ED NOTES
Bed: HD  Expected date:   Expected time:   Means of arrival:   Comments:  CHANEL Carlisle RN  10/05/21 4845

## 2021-10-06 ASSESSMENT — ENCOUNTER SYMPTOMS
COUGH: 0
NAUSEA: 0
CONSTIPATION: 0
TROUBLE SWALLOWING: 0
COLOR CHANGE: 0
VOICE CHANGE: 0
VOMITING: 0
SHORTNESS OF BREATH: 0
BACK PAIN: 0
WHEEZING: 0
DIARRHEA: 0
RECTAL PAIN: 0
ABDOMINAL PAIN: 0

## 2021-10-06 NOTE — ED PROVIDER NOTES
General: He is not in acute distress. Appearance: Normal appearance. He is not ill-appearing or diaphoretic. HENT:      Head: Normocephalic and atraumatic. Nose: Nose normal.   Eyes:      Extraocular Movements: Extraocular movements intact. Pupils: Pupils are equal, round, and reactive to light. Cardiovascular:      Rate and Rhythm: Normal rate and regular rhythm. Pulses: Normal pulses. Heart sounds: Normal heart sounds. Pulmonary:      Effort: Pulmonary effort is normal.      Breath sounds: Normal breath sounds. Abdominal:      General: Abdomen is flat. Palpations: Abdomen is soft. Tenderness: There is abdominal tenderness. There is no right CVA tenderness, left CVA tenderness or rebound. Negative signs include Mascorro's sign, Rovsing's sign and McBurney's sign. Musculoskeletal:         General: Normal range of motion. Cervical back: Normal range of motion. Comments: Right lower extremity edematous from proximal thigh to distal tibia. Mildly tender to palpation. No platelet thermia. No pallor. No paresthesias. No paralysis. Pain not out of proportion to exam.  No erythema or streaking. Dorsal pedal pulses 1+ equal bilateral.   Skin:     General: Skin is warm and dry. Capillary Refill: Capillary refill takes less than 2 seconds. Neurological:      General: No focal deficit present. Mental Status: He is alert and oriented to person, place, and time. Psychiatric:         Mood and Affect: Mood normal.          Procedures     MDM  Number of Diagnoses or Management Options  Right leg swelling  Diagnosis management comments: 75-year-old male significant history of cellulitis on outpatient IV antibiotics, on warfarin for history of DVTs in the left leg presents with complaints of fall 3 days prior. Patient states that now his right leg is swollen. Vitals significant for hypertension and tachycardia.   Patient is no acute distress but does look uncomfortable. His right leg is larger than his left leg. Mildly tender to palpation. Not cool to the touch. Sensation intact. Plantar and dorsiflexion 5 out of 5 strength. No ecchymosis of the right lower extremity. Diagnostic labs and imaging interpreted and reviewed. Patient has a markedly elevated INR. No acute process other than swelling of that right leg her ultrasound and x-rays of femur and tibia. There are no DVTs. There are no fractures. I do not suspect this is compartment syndrome from physical exam.  Patient informed of all the results of evaluation. He is recommended to stop taking warfarin for the next 2 days until he gets his INR checked. Patient agreeable plan for discharge. Patient is awake alert, hemodynamic stable, afebrile and in no respiratory distress. Discussed with patient plan for close outpatient follow-up with the patient's PCP as well as return precautions and the patient understands and agrees to the plan. Patient was seen with attending.      ED Course as of Oct 06 0701   Soni Fieldskennedy Oct 05, 2021   1808 Patient's INR is 6.2.    [JV]      ED Course User Index  [JV] Arie Pickering MD           ED Course as of Oct 06 0701   Tue Oct 05, 2021   1808 Patient's INR is 6.2.    [JV]      ED Course User Index  [JV] Arie Pickering MD       --------------------------------------------- PAST HISTORY ---------------------------------------------  Past Medical History:  has a past medical history of Accident, Acute thrombosis of inferior vena cava ([address] SIMÓN (acute kidney injury) ([address] Allergic rhinitis, Blister of left leg, Cellulitis of leg, left, Chronic back pain, Depression, Dermatophytosis, Diabetes mellitus ([address] Difficulty sleeping, Displacement of lumbar intervertebral disc without myelopathy, Fibromyalgia, Fractured rib, H/O seasonal allergies, Head injury, History of deep vein thrombosis, Hyperlipidemia, Hypertension, 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[address] Ref Range    WBC 15.3 (H) 4.5 - 11.5 E9/L    RBC 3.80 3.80 - 5.80 E12/L    Hemoglobin 11.5 (L) 12.5 - 16.5 g/dL    Hematocrit 34.7 (L) 37.0 - 54.0 %    MCV 91.3 80.0 - 99.9 fL    MCH 30.3 26.0 - 35.0 pg    MCHC 33.1 32.0 - 34.5 %    RDW 15.0 11.5 - 15.0 fL    Platelets 111 (H) 679 - 450 E9/L    MPV 9.8 7.0 - 12.0 fL    Neutrophils % 78.1 43.0 - 80.0 %    Immature Granulocytes % 0.6 0.0 - 5.0 %    Lymphocytes % 9.8 (L) 20.0 - 42.0 %    Monocytes % 9.8 2.0 - 12.0 %    Eosinophils % 1.0 0.0 - 6.0 %    Basophils % 0.7 0.0 - 2.0 %    Neutrophils Absolute 11.92 (H) 1.80 - 7.30 E9/L    Immature Granulocytes # 0.09 E9/L    Lymphocytes Absolute 1.49 (L) 1.50 - 4.00 E9/L    Monocytes Absolute 1.50 (H) 0.10 - 0.95 E9/L    Eosinophils Absolute 0.16 0.05 - 0.50 E9/L    Basophils Absolute 0.10 0.00 - 0.20 E9/L   Comprehensive Metabolic Panel   Result Value Ref Range    Sodium 135 132 - 146 mmol/L    Potassium 4.5 3.5 - 5.0 mmol/L    Chloride 96 (L) 98 - 107 mmol/L    CO2 27 22 - 29 mmol/L    Anion Gap 12 7 - 16 mmol/L    Glucose 132 (H) 74 - 99 mg/dL    BUN 14 6 - 20 mg/dL    CREATININE 1.0 0.7 - 1.2 mg/dL    GFR Non-African American >60 >=60 mL/min/1.73    GFR African American >60     Calcium 10.0 8.6 - 10.2 mg/dL    Total Protein 6.8 6.4 - 8.3 g/dL    Albumin 4.0 3.5 - 5.2 g/dL    Total Bilirubin 0.4 0.0 - 1.2 mg/dL    Alkaline Phosphatase 153 (H) 40 - 129 U/L    ALT 18 0 - 40 U/L    AST 20 0 - 39 U/L   Magnesium   Result Value Ref Range    Magnesium 1.7 1.6 - 2.6 mg/dL       ECG:  Please refer to workup tab for EKG read    Radiology:  XR FEMUR RIGHT (MIN 2 VIEWS)   Final Result   No acute fractures or prosthetic complications. Knee joint effusion. XR TIBIA FIBULA RIGHT (2 VIEWS)   Final Result   No acute osseous abnormality. Knee joint effusion.          US DUP LOWER EXTREMITY RIGHT JORGE   Final Result   No evidence of DVT in the right lower extremity.             ------------------------- NURSING NOTES AND VITALS REVIEWED ---------------------------  Date / Time Roomed:  10/5/2021  3:47 PM  ED Bed Assignment:  ZELDA OLVERA    The nursing notes within the ED encounter and vital signs as below have been reviewed. BP (!) 169/98   Pulse 110   Temp 98.2 °F (36.8 °C)   Resp 18   Ht 6' 1\" (1.854 m)   Wt 299 lb (135.6 kg)   SpO2 97%   BMI 39.45 kg/m²   Oxygen Saturation Interpretation: normal      ------------------------------------------ PROGRESS NOTES ------------------------------------------    I have spoken with the patient and discussed todays results, in addition to providing specific details for the plan of care and counseling regarding the diagnosis and prognosis. Their questions are answered at this time and they are agreeable with the plan. I discussed at length with them reasons for immediate return here for re evaluation. They will followup with their PCP by calling their office tomorrow. --------------------------------- ADDITIONAL PROVIDER NOTES ---------------------------------  At this time the patient is without objective evidence of an acute process requiring hospitalization or inpatient management. They have remained hemodynamically stable throughout their entire ED visit and are stable for discharge with outpatient follow-up. The plan has been discussed in detail and they are aware of the specific conditions for emergent return, as well as the importance of follow-up. Discharge Medication List as of 10/5/2021  8:04 PM          Diagnosis:  1. Right leg swelling        Disposition:  Patient's disposition: Discharge to home  Patient's condition is stable.       Osbaldo Campbell MD  Resident  10/06/21 [id number]

## 2021-10-08 ENCOUNTER — APPOINTMENT (OUTPATIENT)
Dept: CT IMAGING | Age: 46
DRG: 351 | End: 2021-10-08
Payer: MEDICARE

## 2021-10-08 ENCOUNTER — HOSPITAL ENCOUNTER (INPATIENT)
Age: 46
LOS: 2 days | Discharge: HOME HEALTH | DRG: 351 | End: 2021-10-13
Attending: STUDENT IN AN ORGANIZED HEALTH CARE EDUCATION/TRAINING PROGRAM | Admitting: STUDENT IN AN ORGANIZED HEALTH CARE EDUCATION/TRAINING PROGRAM
Payer: MEDICARE

## 2021-10-08 ENCOUNTER — APPOINTMENT (OUTPATIENT)
Dept: GENERAL RADIOLOGY | Age: 46
DRG: 351 | End: 2021-10-08
Payer: MEDICARE

## 2021-10-08 DIAGNOSIS — L03.116 CELLULITIS OF LEFT LOWER EXTREMITY: ICD-10-CM

## 2021-10-08 DIAGNOSIS — N17.9 ACUTE KIDNEY INJURY (HCC): ICD-10-CM

## 2021-10-08 DIAGNOSIS — M25.461 PAIN AND SWELLING OF RIGHT KNEE: Primary | ICD-10-CM

## 2021-10-08 DIAGNOSIS — T82.524A DISPLACEMENT OF PERIPHERALLY INSERTED CENTRAL CATHETER (PICC) (HCC): ICD-10-CM

## 2021-10-08 DIAGNOSIS — M25.561 PAIN AND SWELLING OF RIGHT KNEE: Primary | ICD-10-CM

## 2021-10-08 PROBLEM — N17.0 ACUTE KIDNEY INJURY (AKI) WITH ACUTE TUBULAR NECROSIS (ATN) (HCC): Status: ACTIVE | Noted: 2021-10-08

## 2021-10-08 LAB
ANION GAP SERPL CALCULATED.3IONS-SCNC: 20 MMOL/L (ref 7–16)
ANISOCYTOSIS: ABNORMAL
APPEARANCE FLUID: NORMAL
BASOPHILS ABSOLUTE: 0.06 E9/L (ref 0–0.2)
BASOPHILS RELATIVE PERCENT: 0.3 % (ref 0–2)
BUN BLDV-MCNC: 40 MG/DL (ref 6–20)
BURR CELLS: ABNORMAL
CALCIUM SERPL-MCNC: 9.6 MG/DL (ref 8.6–10.2)
CELL COUNT FLUID TYPE: NORMAL
CHLORIDE BLD-SCNC: 86 MMOL/L (ref 98–107)
CO2: 18 MMOL/L (ref 22–29)
COLOR FLUID: NORMAL
CREAT SERPL-MCNC: 2.3 MG/DL (ref 0.7–1.2)
EOSINOPHILS ABSOLUTE: 0.04 E9/L (ref 0.05–0.5)
EOSINOPHILS RELATIVE PERCENT: 0.2 % (ref 0–6)
GFR AFRICAN AMERICAN: 37
GFR NON-AFRICAN AMERICAN: 31 ML/MIN/1.73
GLUCOSE BLD-MCNC: 209 MG/DL (ref 74–99)
HCT VFR BLD CALC: 31.8 % (ref 37–54)
HEMOGLOBIN: 10.4 G/DL (ref 12.5–16.5)
IMMATURE GRANULOCYTES #: 0.12 E9/L
IMMATURE GRANULOCYTES %: 0.7 % (ref 0–5)
INR BLD: 2.5
LYMPHOCYTES ABSOLUTE: 1.28 E9/L (ref 1.5–4)
LYMPHOCYTES RELATIVE PERCENT: 7 % (ref 20–42)
MCH RBC QN AUTO: 30.1 PG (ref 26–35)
MCHC RBC AUTO-ENTMCNC: 32.7 % (ref 32–34.5)
MCV RBC AUTO: 91.9 FL (ref 80–99.9)
METER GLUCOSE: 186 MG/DL (ref 74–99)
MONOCYTE, FLUID: 9 %
MONOCYTES ABSOLUTE: 1.51 E9/L (ref 0.1–0.95)
MONOCYTES RELATIVE PERCENT: 8.3 % (ref 2–12)
NEUTROPHIL, FLUID: 91 %
NEUTROPHILS ABSOLUTE: 15.23 E9/L (ref 1.8–7.3)
NEUTROPHILS RELATIVE PERCENT: 83.5 % (ref 43–80)
NUCLEATED CELLS FLUID: NORMAL /UL
OSMOLALITY URINE: 289 MOSM/KG (ref 300–900)
OVALOCYTES: ABNORMAL
PDW BLD-RTO: 14.6 FL (ref 11.5–15)
PLATELET # BLD: 570 E9/L (ref 130–450)
PMV BLD AUTO: 9.4 FL (ref 7–12)
POIKILOCYTES: ABNORMAL
POLYCHROMASIA: ABNORMAL
POTASSIUM SERPL-SCNC: 4.4 MMOL/L (ref 3.5–5)
PROTHROMBIN TIME: 26.7 SEC (ref 9.3–12.4)
RBC # BLD: 3.46 E12/L (ref 3.8–5.8)
RBC FLUID: NORMAL /UL
SCHISTOCYTES: ABNORMAL
SODIUM BLD-SCNC: 124 MMOL/L (ref 132–146)
SODIUM URINE: <20 MMOL/L
TOTAL CK: 638 U/L (ref 20–200)
WBC # BLD: 18.2 E9/L (ref 4.5–11.5)

## 2021-10-08 PROCEDURE — 89060 EXAM SYNOVIAL FLUID CRYSTALS: CPT

## 2021-10-08 PROCEDURE — C1751 CATH, INF, PER/CENT/MIDLINE: HCPCS

## 2021-10-08 PROCEDURE — 96365 THER/PROPH/DIAG IV INF INIT: CPT

## 2021-10-08 PROCEDURE — 0S9C3ZX DRAINAGE OF RIGHT KNEE JOINT, PERCUTANEOUS APPROACH, DIAGNOSTIC: ICD-10-PCS | Performed by: FAMILY MEDICINE

## 2021-10-08 PROCEDURE — 2580000003 HC RX 258: Performed by: STUDENT IN AN ORGANIZED HEALTH CARE EDUCATION/TRAINING PROGRAM

## 2021-10-08 PROCEDURE — 6370000000 HC RX 637 (ALT 250 FOR IP): Performed by: STUDENT IN AN ORGANIZED HEALTH CARE EDUCATION/TRAINING PROGRAM

## 2021-10-08 PROCEDURE — 89051 BODY FLUID CELL COUNT: CPT

## 2021-10-08 PROCEDURE — 80048 BASIC METABOLIC PNL TOTAL CA: CPT

## 2021-10-08 PROCEDURE — 73700 CT LOWER EXTREMITY W/O DYE: CPT

## 2021-10-08 PROCEDURE — 36569 INSJ PICC 5 YR+ W/O IMAGING: CPT

## 2021-10-08 PROCEDURE — 96372 THER/PROPH/DIAG INJ SC/IM: CPT

## 2021-10-08 PROCEDURE — 83935 ASSAY OF URINE OSMOLALITY: CPT

## 2021-10-08 PROCEDURE — 82550 ASSAY OF CK (CPK): CPT

## 2021-10-08 PROCEDURE — 87205 SMEAR GRAM STAIN: CPT

## 2021-10-08 PROCEDURE — 96361 HYDRATE IV INFUSION ADD-ON: CPT

## 2021-10-08 PROCEDURE — 02HV33Z INSERTION OF INFUSION DEVICE INTO SUPERIOR VENA CAVA, PERCUTANEOUS APPROACH: ICD-10-PCS | Performed by: FAMILY MEDICINE

## 2021-10-08 PROCEDURE — 76937 US GUIDE VASCULAR ACCESS: CPT

## 2021-10-08 PROCEDURE — 6370000000 HC RX 637 (ALT 250 FOR IP): Performed by: FAMILY MEDICINE

## 2021-10-08 PROCEDURE — G0378 HOSPITAL OBSERVATION PER HR: HCPCS

## 2021-10-08 PROCEDURE — 85025 COMPLETE CBC W/AUTO DIFF WBC: CPT

## 2021-10-08 PROCEDURE — 82962 GLUCOSE BLOOD TEST: CPT

## 2021-10-08 PROCEDURE — 84300 ASSAY OF URINE SODIUM: CPT

## 2021-10-08 PROCEDURE — 99283 EMERGENCY DEPT VISIT LOW MDM: CPT

## 2021-10-08 PROCEDURE — 6360000002 HC RX W HCPCS: Performed by: STUDENT IN AN ORGANIZED HEALTH CARE EDUCATION/TRAINING PROGRAM

## 2021-10-08 PROCEDURE — 85610 PROTHROMBIN TIME: CPT

## 2021-10-08 PROCEDURE — 87070 CULTURE OTHR SPECIMN AEROBIC: CPT

## 2021-10-08 PROCEDURE — 73562 X-RAY EXAM OF KNEE 3: CPT

## 2021-10-08 RX ORDER — INSULIN GLARGINE 100 [IU]/ML
25 INJECTION, SOLUTION SUBCUTANEOUS NIGHTLY
Status: DISCONTINUED | OUTPATIENT
Start: 2021-10-08 | End: 2021-10-13 | Stop reason: HOSPADM

## 2021-10-08 RX ORDER — LOPERAMIDE HYDROCHLORIDE 2 MG/1
2 CAPSULE ORAL 4 TIMES DAILY PRN
Status: DISCONTINUED | OUTPATIENT
Start: 2021-10-08 | End: 2021-10-13 | Stop reason: HOSPADM

## 2021-10-08 RX ORDER — ACETAMINOPHEN 650 MG/1
650 SUPPOSITORY RECTAL EVERY 6 HOURS PRN
Status: DISCONTINUED | OUTPATIENT
Start: 2021-10-08 | End: 2021-10-13 | Stop reason: HOSPADM

## 2021-10-08 RX ORDER — HYDROCODONE BITARTRATE AND ACETAMINOPHEN 5; 325 MG/1; MG/1
2 TABLET ORAL ONCE
Status: COMPLETED | OUTPATIENT
Start: 2021-10-08 | End: 2021-10-08

## 2021-10-08 RX ORDER — ONDANSETRON 4 MG/1
4 TABLET, ORALLY DISINTEGRATING ORAL EVERY 8 HOURS PRN
Status: DISCONTINUED | OUTPATIENT
Start: 2021-10-08 | End: 2021-10-13 | Stop reason: HOSPADM

## 2021-10-08 RX ORDER — HYDROCODONE BITARTRATE AND ACETAMINOPHEN 5; 325 MG/1; MG/1
1 TABLET ORAL EVERY 4 HOURS PRN
Status: DISCONTINUED | OUTPATIENT
Start: 2021-10-08 | End: 2021-10-12

## 2021-10-08 RX ORDER — DEXTROSE MONOHYDRATE 25 G/50ML
12.5 INJECTION, SOLUTION INTRAVENOUS PRN
Status: DISCONTINUED | OUTPATIENT
Start: 2021-10-08 | End: 2021-10-13 | Stop reason: HOSPADM

## 2021-10-08 RX ORDER — LANOLIN ALCOHOL/MO/W.PET/CERES
3 CREAM (GRAM) TOPICAL NIGHTLY PRN
Status: DISCONTINUED | OUTPATIENT
Start: 2021-10-08 | End: 2021-10-13 | Stop reason: HOSPADM

## 2021-10-08 RX ORDER — NICOTINE POLACRILEX 4 MG
15 LOZENGE BUCCAL PRN
Status: DISCONTINUED | OUTPATIENT
Start: 2021-10-08 | End: 2021-10-13 | Stop reason: HOSPADM

## 2021-10-08 RX ORDER — 0.9 % SODIUM CHLORIDE 0.9 %
1000 INTRAVENOUS SOLUTION INTRAVENOUS ONCE
Status: COMPLETED | OUTPATIENT
Start: 2021-10-08 | End: 2021-10-08

## 2021-10-08 RX ORDER — LISINOPRIL 5 MG/1
5 TABLET ORAL DAILY
COMMUNITY
End: 2021-12-22

## 2021-10-08 RX ORDER — LISINOPRIL 5 MG/1
5 TABLET ORAL DAILY
Status: DISCONTINUED | OUTPATIENT
Start: 2021-10-08 | End: 2021-10-13 | Stop reason: HOSPADM

## 2021-10-08 RX ORDER — METOPROLOL SUCCINATE 25 MG/1
25 TABLET, EXTENDED RELEASE ORAL DAILY
Status: DISCONTINUED | OUTPATIENT
Start: 2021-10-08 | End: 2021-10-13 | Stop reason: HOSPADM

## 2021-10-08 RX ORDER — POLYETHYLENE GLYCOL 3350 17 G/17G
17 POWDER, FOR SOLUTION ORAL DAILY PRN
Status: DISCONTINUED | OUTPATIENT
Start: 2021-10-08 | End: 2021-10-13 | Stop reason: HOSPADM

## 2021-10-08 RX ORDER — CYCLOBENZAPRINE HCL 10 MG
10 TABLET ORAL 2 TIMES DAILY PRN
Status: DISCONTINUED | OUTPATIENT
Start: 2021-10-08 | End: 2021-10-13 | Stop reason: HOSPADM

## 2021-10-08 RX ORDER — SODIUM CHLORIDE 0.9 % (FLUSH) 0.9 %
5-40 SYRINGE (ML) INJECTION PRN
Status: DISCONTINUED | OUTPATIENT
Start: 2021-10-08 | End: 2021-10-13 | Stop reason: HOSPADM

## 2021-10-08 RX ORDER — SODIUM CHLORIDE 9 MG/ML
25 INJECTION, SOLUTION INTRAVENOUS PRN
Status: DISCONTINUED | OUTPATIENT
Start: 2021-10-08 | End: 2021-10-13 | Stop reason: HOSPADM

## 2021-10-08 RX ORDER — SODIUM CHLORIDE 0.9 % (FLUSH) 0.9 %
5-40 SYRINGE (ML) INJECTION EVERY 12 HOURS SCHEDULED
Status: DISCONTINUED | OUTPATIENT
Start: 2021-10-08 | End: 2021-10-13 | Stop reason: HOSPADM

## 2021-10-08 RX ORDER — IBUPROFEN 200 MG
400 TABLET ORAL EVERY 6 HOURS PRN
COMMUNITY
End: 2022-04-01 | Stop reason: SDUPTHER

## 2021-10-08 RX ORDER — HYDROXYUREA 500 MG/1
500 CAPSULE ORAL 2 TIMES DAILY
Status: DISCONTINUED | OUTPATIENT
Start: 2021-10-08 | End: 2021-10-13 | Stop reason: HOSPADM

## 2021-10-08 RX ORDER — ONDANSETRON 2 MG/ML
4 INJECTION INTRAMUSCULAR; INTRAVENOUS EVERY 6 HOURS PRN
Status: DISCONTINUED | OUTPATIENT
Start: 2021-10-08 | End: 2021-10-13 | Stop reason: HOSPADM

## 2021-10-08 RX ORDER — DEXTROSE MONOHYDRATE 50 MG/ML
100 INJECTION, SOLUTION INTRAVENOUS PRN
Status: DISCONTINUED | OUTPATIENT
Start: 2021-10-08 | End: 2021-10-13 | Stop reason: HOSPADM

## 2021-10-08 RX ORDER — CYCLOBENZAPRINE HCL 10 MG
10 TABLET ORAL 2 TIMES DAILY PRN
COMMUNITY
End: 2022-05-11

## 2021-10-08 RX ORDER — ACETAMINOPHEN 325 MG/1
650 TABLET ORAL EVERY 6 HOURS PRN
Status: DISCONTINUED | OUTPATIENT
Start: 2021-10-08 | End: 2021-10-13 | Stop reason: HOSPADM

## 2021-10-08 RX ADMIN — ENOXAPARIN SODIUM 40 MG: 40 INJECTION SUBCUTANEOUS at 20:56

## 2021-10-08 RX ADMIN — SODIUM CHLORIDE 1000 ML: 9 INJECTION, SOLUTION INTRAVENOUS at 18:42

## 2021-10-08 RX ADMIN — CYCLOBENZAPRINE 10 MG: 10 TABLET, FILM COATED ORAL at 20:55

## 2021-10-08 RX ADMIN — Medication 10 ML: at 20:57

## 2021-10-08 RX ADMIN — LISINOPRIL 5 MG: 10 TABLET ORAL at 20:56

## 2021-10-08 RX ADMIN — METOPROLOL SUCCINATE 25 MG: 25 TABLET, EXTENDED RELEASE ORAL at 20:56

## 2021-10-08 RX ADMIN — HYDROCODONE BITARTRATE AND ACETAMINOPHEN 1 TABLET: 5; 325 TABLET ORAL at 21:08

## 2021-10-08 RX ADMIN — INSULIN GLARGINE 25 UNITS: 100 INJECTION, SOLUTION SUBCUTANEOUS at 20:58

## 2021-10-08 RX ADMIN — HYDROCODONE BITARTRATE AND ACETAMINOPHEN 2 TABLET: 5; 325 TABLET ORAL at 15:50

## 2021-10-08 RX ADMIN — SODIUM CHLORIDE 1000 ML: 9 INJECTION, SOLUTION INTRAVENOUS at 17:27

## 2021-10-08 RX ADMIN — DAPTOMYCIN 600 MG: 500 INJECTION, POWDER, LYOPHILIZED, FOR SOLUTION INTRAVENOUS at 20:56

## 2021-10-08 ASSESSMENT — PAIN DESCRIPTION - LOCATION: LOCATION: KNEE

## 2021-10-08 ASSESSMENT — PAIN SCALES - GENERAL
PAINLEVEL_OUTOF10: 10
PAINLEVEL_OUTOF10: 6
PAINLEVEL_OUTOF10: 10

## 2021-10-08 ASSESSMENT — PAIN - FUNCTIONAL ASSESSMENT: PAIN_FUNCTIONAL_ASSESSMENT: PREVENTS OR INTERFERES SOME ACTIVE ACTIVITIES AND ADLS

## 2021-10-08 ASSESSMENT — PAIN DESCRIPTION - PROGRESSION: CLINICAL_PROGRESSION: NOT CHANGED

## 2021-10-08 ASSESSMENT — PAIN DESCRIPTION - FREQUENCY: FREQUENCY: CONTINUOUS

## 2021-10-08 ASSESSMENT — PAIN DESCRIPTION - PAIN TYPE: TYPE: ACUTE PAIN

## 2021-10-08 ASSESSMENT — PAIN DESCRIPTION - ONSET: ONSET: ON-GOING

## 2021-10-08 ASSESSMENT — PAIN DESCRIPTION - DESCRIPTORS: DESCRIPTORS: ACHING;CONSTANT;DISCOMFORT

## 2021-10-08 ASSESSMENT — PAIN DESCRIPTION - ORIENTATION: ORIENTATION: RIGHT

## 2021-10-08 NOTE — H&P
Hospital Medicine History & Physical      PCP: Marvin Boxer, APRN - CNP    Date of Admission: 10/8/2021    Date of Service: Pt seen/examined on 10/8/2021 and Placed in Observation. Chief Complaint: Right knee pain along with left thigh pain. History Of Present Illness:    50 y.o. male who presented to [facility] with complaints of a dislodged PICC line. Patient was recently admitted for cellulitis and was discharged to home on daptomycin for 6 weeks. He was receiving it through home health nursing giving the antibiotics daily. Today while giving the antibiotics PICC line was dislodged and hence he was brought into the ER. Patient also endorses that he has been having some pain in his right knee after a fall about 1 week back. He had swelling in the right knee which was increasing and hence he had imaging done on 10/5/2020 of the right knee which did not show any acute findings. Since then patient states that his pain has worsened but the swelling has improved. Patient does not complain of any warmth or fever/chills associated. He does endorse that the cellulitis in left lower extremity is better now with respect to the redness/pain/swelling. Patient currently has stopped taking his Coumadin since the fall 1 week back-his INR was 6.0 at that time.     Past Medical History:          Diagnosis Date    Accident 11/2019    stepped on nail rt ft about 1 month ago- healed per patient    Acute thrombosis of inferior vena cava ([address] 10/10/2020    SIMÓN (acute kidney injury) ([address] 2008 apx    kidney bruised due to fall / [place]    Allergic rhinitis     Blister of left leg 8/31/2021    Cellulitis of leg, left 8/31/2021    Chronic back pain     Depression     Dermatophytosis 8/31/2021    Diabetes mellitus ([address]     Difficulty sleeping     at times    Displacement of lumbar intervertebral disc without myelopathy     Fibromyalgia     Fractured rib     2008 / healed    H/O seasonal allergies     Head injury 1980'S apx    no residual s/s    History of deep vein thrombosis 8/31/2021    Hyperlipidemia     Hypertension     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    Lymphedema of left leg 8/31/2021    Obesity (BMI 35.0-39.9 without comorbidity)     bmi 39.2  weight 296 #    Osteoarthritis     Recurrent acute deep vein thrombosis (DVT) of left lower extremity ([address] 8/31/2021    S/P IVC filter 8/31/2021    Subtherapeutic international normalized ratio (INR) 8/31/2021    Thoracic or lumbosacral neuritis or radiculitis, unspecified        Past Surgical History:          Procedure Laterality Date    COLONOSCOPY N/A 9/5/2020    COLONOSCOPY WITH BIOPSY performed by Tasia Altamirano MD at [address] PTC NEW ACCESS  8/3/2020    CT PTC NEW ACCESS 8/3/2020 [facility] CT    FOOT SURGERY Right 1985    to treat shattered bones    HERNIA REPAIR  2001    DOUBLE HERNIA    HERNIA REPAIR Right 12/9/2019    LAPAROSCOPIC RIGHT INGUINAL HERNIA REPAIR, MESH 10x15 cm PLACEMENT performed by Tasia Manzano MD at [address] [facility] Left 4/11/2016    ILIAC ARTERY STENT INSERTION N/A 10/11/2020    S/P ILIAC STENT PLACEMENT VISUALIZATION performed by Yadira Romero MD at [place] [address] N/A 9/18/2020    LAPAROTOMY EXPLORATORY, CHOLECYSTECTOMY, BOWEL RESECTION, right darian colectomy, partial omentectomy, and splenectomy performed by Tasia Altamirano MD at [address] [facility] COLONOSCOPY FLX DX W/COLLJ [place] 1978 PFRMD N/A 5/7/2018    COLONOSCOPY DIAGNOSTIC performed by Mercedes Rose MD at [address] [facility] Left 2014    Dr. Fanny Cage ARTHROSCOPY Left 11 21 14    SHOULDER SURGERY  2001 &2007    RIGHT AND LEFT repair of tears    THROMBECTOMY / EMBOLECTOMY FEMORAL Left 1/1/2021    LEFT LOWER EXTREMITY, VENOGRAM, FOLLOW UP POSSIBLE REMOVAL LYSIS CATHETER performed by Yadira Romero MD at [place] [address] / EMBOLECTOMY FEMORAL Left 1/2/2021    LEFT LOWER EXTREMITY VENOGRAM performed by Naty Abdullahi MD at [address] [facility] Right 10/31/2016    Total right hip  Drew Davis MD    UPPER GASTROINTESTINAL ENDOSCOPY N/A 9/4/2020    EGD DIAGNOSTIC ONLY performed by Rosi Kelley MD at [address] [facility] Left 1/3/2021    LEFT LOWER EXTREMITY VENOGRAM, PLACEMENT OF NEW LYSIS CATHETER performed by Naty Abdullahi MD at [place] [address]  2007    LEFT WRIST       Medications Prior to Admission:      Prior to Admission medications    Medication Sig Start Date End Date Taking? Authorizing Provider   lisinopril (PRINIVIL;ZESTRIL) 5 MG tablet Take 5 mg by mouth daily   Yes Historical Provider, MD   ibuprofen (ADVIL;MOTRIN) 200 MG tablet Take 400 mg by mouth every 6 hours as needed for Pain   Yes Historical Provider, MD   metoprolol succinate (TOPROL XL) 25 MG extended release tablet Take 1 tablet by mouth daily 9/20/21  Yes IGOR Spencer CNP   insulin glargine (LANTUS) 100 UNIT/ML injection vial Inject 25 Units into the skin nightly 9/20/21  Yes IGOR Spencer CNP   blood glucose test strips (EVENCARE G2 TEST) strip 1 each by In Vitro route daily As needed.  9/20/21  Yes IGOR Spencer CNP   warfarin (COUMADIN) 2.5 MG tablet Take as directed 9/14/21  Yes IGOR Spnecer CNP   Insulin Syringe-Needle U-100 30G X 5/16\" 1 ML MISC 1 each by Does not apply route daily Use as directed 9/14/21  Yes IGOR Spencer CNP   daptomycin (CUBICIN) infusion Infuse 611.4 mg intravenously every 24 hours Stop on 10/14/2021 9/10/21 10/14/21 Yes Michelle Forde MD   warfarin (COUMADIN) 5 MG tablet Take 5 mg by mouth every evening Given with Warfarin 2.5 mg total dose Warfarin 7.5 mg daily   Yes Historical Provider, MD   loperamide (IMODIUM) 2 MG capsule Take 1 capsule by mouth 4 times daily as needed for Diarrhea 4/19/21  Yes Abdulaziz Caba, APRN - CNP   hydroxyurea (HYDREA) 500 MG chemo capsule Take 500 mg by mouth 2 times daily   Yes Historical Provider, MD   cyclobenzaprine (FLEXERIL) 10 MG tablet Take 10 mg by mouth 2 times daily as needed for Muscle spasms    Historical Provider, MD   melatonin 3 MG TABS tablet Take 3 mg by mouth nightly as needed (sleep)    Historical Provider, MD       Allergies:  Seasonal and Tramadol    Social History:      TOBACCO:   reports that he has never smoked. His smokeless tobacco use includes chew. ETOH:   reports previous alcohol use. Family History:       Reviewed in detail and negative for DM, CAD, Cancer, CVA. Positive as follows:        Problem Relation Age of Onset    Hypertension Mother     Arthritis Father     Diabetes Father     Cancer Maternal Grandfather         Skin    Cancer Paternal Uncle         skin    Diabetes Paternal Grandfather     Heart Disease Maternal Grandmother     Stroke Maternal Aunt        REVIEW OF SYSTEMS:   Pertinent positives as noted in the HPI. All other systems reviewed and negative. PHYSICAL EXAM:    /68   Pulse 120   Temp 97.9 °F (36.6 °C)   Resp 18   Wt 299 lb (135.6 kg)   SpO2 98%   BMI 39.45 kg/m²     General appearance:  No apparent distress, appears stated age and cooperative. HEENT:  Normal cephalic, atraumatic without obvious deformity. Pupils equal, round, and reactive to light. Extra ocular muscles intact. Conjunctivae/corneas clear. Neck: Supple, with full range of motion. No jugular venous distention. Trachea midline. Respiratory:  Normal respiratory effort. Clear to auscultation, bilaterally without Rales/Wheezes/Rhonchi. Cardiovascular:  Regular rate and rhythm with normal S1/S2 without murmurs, rubs or gallops. Abdomen: Soft, non-tender, non-distended with normal bowel sounds. Musculoskeletal:  No clubbing, cyanosis or edema bilaterally. Full range of motion without deformity.   Skin: right lower extremity-right been started on IV fluids  -Strict intake and output  -We will continue daptomycin [until 10-] for his left lower extremity cellulitis. Examination shows no erythema/tenderness of left lower extremity.  -Right knee shows some swelling, mild tenderness. Ortho has been consulted. No imaging has been obtained in the ER. Will await for orthopedic recommendations with respect to either x-ray or CT of the knee to rule out any hemarthrosis. On examination pulses present distally and low suspicion for compartment syndrome at this time  -Will be placed on sliding scale insulin along with continuing basal Lantus 25 units  -Orthopedics consulted-appreciate recommendations  -Hyponatremia-we will continue fluids at this time, urine sodium, urine osm ordered  -INR today 2.5  -X-ray of the femur-right reviewed from 10/5/2021 shows joint effusion of the right knee-shows no fractures in the tibia/fibula.  -We will continue home medications for depression, hyperlipidemia, hypertension    DVT Prophylaxis: Warfarin has been switched with Lovenox for the time being. Cautiously given and may be held if patient has hemarthrosis. Diet: ADULT DIET; Regular; 3 carb choices (45 gm/meal);  No Concentrated sweets  Code Status: Full Code    Dispo - observation       Arnaldo Cagle MD

## 2021-10-08 NOTE — CONSULTS
Department of Orthopedic Surgery  Resident Consult Note          Reason for Consult:  Right lower extremity pain, swelling    HISTORY OF PRESENT ILLNESS:       Patient is a 50 y.o. male who presents with right knee and thigh swelling of roughly 3 to 4 days duration. Patient states that he had a mild trauma to his right knee roughly 3 to 4 days ago and subsequently presented to the ER at that time he noted increased swelling of his knee as well as his thigh thereafter. He states that his pain is currently in his knee and radiates up the anterior portion of his thigh. He has noted increase in swelling. He does admit to chronic neuropathies. He has a past medical history of sepsis requiring intubation as well as cellulitis of bilateral lower extremities. He has been hospitalized multiple times in the past year for septic-like issues. Currently he states he originally presented to the ER due to a PICC line problem where he does receive antibiotics. He states he is able to walk on his right lower extremity with a cane. He does admit to pain when he bends his knee. He rests in a flexed position. He denies any new onset numbness or tingling. He also has history of diabetes, PE, DVT, vena cava filter placement. He is currently being anticoagulated with Coumadin.     Past Medical History:        Diagnosis Date    Accident 11/2019    stepped on nail rt ft about 1 month ago- healed per patient    Acute thrombosis of inferior vena cava ([address] 10/10/2020    SIMÓN (acute kidney injury) ([address] 2008 apx    kidney bruised due to fall / [place]    Allergic rhinitis     Blister of left leg 8/31/2021    Cellulitis of leg, left 8/31/2021    Chronic back pain     Depression     Dermatophytosis 8/31/2021    Diabetes mellitus ([address]     Difficulty sleeping     at times    Displacement of lumbar intervertebral disc without myelopathy     Fibromyalgia     Fractured rib     2008 / healed    H/O seasonal allergies  Head injury 1980'S apx    no residual s/s    History of deep vein thrombosis 8/31/2021    Hyperlipidemia     Hypertension     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    Lymphedema of left leg 8/31/2021    Obesity (BMI 35.0-39.9 without comorbidity)     bmi 39.2  weight 296 #    Osteoarthritis     Recurrent acute deep vein thrombosis (DVT) of left lower extremity ([address] 8/31/2021    S/P IVC filter 8/31/2021    Subtherapeutic international normalized ratio (INR) 8/31/2021    Thoracic or lumbosacral neuritis or radiculitis, unspecified      Past Surgical History:        Procedure Laterality Date    COLONOSCOPY N/A 9/5/2020    COLONOSCOPY WITH BIOPSY performed by Mela Santizo MD at [address] [facility] [address] PTC NEW ACCESS  8/3/2020    CT PTC NEW ACCESS 8/3/2020 [facility] CT    FOOT SURGERY Right 1985    to treat shattered bones    HERNIA REPAIR  2001    DOUBLE HERNIA    HERNIA REPAIR Right 12/9/2019    LAPAROSCOPIC RIGHT INGUINAL HERNIA REPAIR, MESH 10x15 cm PLACEMENT performed by Neli Duran MD at [address] [facility] Left 4/11/2016    ILIAC ARTERY STENT INSERTION N/A 10/11/2020    S/P ILIAC STENT PLACEMENT VISUALIZATION performed by Soraya Crook MD at [place] [address] N/A 9/18/2020    LAPAROTOMY EXPLORATORY, CHOLECYSTECTOMY, BOWEL RESECTION, right darian colectomy, partial omentectomy, and splenectomy performed by Mela Santizo MD at [address] [facility] COLONOSCOPY FLX DX W/COLLJ [place] 1978 PFRMD N/A 5/7/2018    COLONOSCOPY DIAGNOSTIC performed by Phineas Mcburney, MD at [address] [place] [address] Left 2014    Dr. Alban Meadows ARTHROSCOPY Left 11 21 14    SHOULDER SURGERY  2001 &2007    RIGHT AND LEFT repair of tears    THROMBECTOMY / EMBOLECTOMY FEMORAL Left 1/1/2021    LEFT LOWER EXTREMITY, VENOGRAM, FOLLOW UP POSSIBLE REMOVAL LYSIS CATHETER performed by Soraya Crook MD at [place] [address] / [facility] FEMORAL Left 1/2/2021    LEFT LOWER EXTREMITY VENOGRAM performed by Amaryllis Prader, MD at [address] [facility] Right 10/31/2016    Total right hip  Wily Barbour MD    UPPER GASTROINTESTINAL ENDOSCOPY N/A 9/4/2020    EGD DIAGNOSTIC ONLY performed by Liat Wesley MD at [address] [facility] Left 1/3/2021    LEFT LOWER EXTREMITY VENOGRAM, PLACEMENT OF NEW LYSIS CATHETER performed by Amaryllis Prader, MD at [address] [facility]  2007    LEFT WRIST     Current Medications:   Current Facility-Administered Medications: 0.9 % sodium chloride bolus, 1,000 mL, IntraVENous, Once  0.9 % sodium chloride bolus, 1,000 mL, IntraVENous, Once  Allergies:  Seasonal and Tramadol    Social History:   TOBACCO:   reports that he has never smoked. His smokeless tobacco use includes chew. ETOH:   reports previous alcohol use. DRUGS:   reports no history of drug use.   ACTIVITIES OF DAILY LIVING:    OCCUPATION:    Family History:       Problem Relation Age of Onset    Hypertension Mother     Arthritis Father     Diabetes Father     Cancer Maternal Grandfather         Skin    Cancer Paternal Uncle         skin    Diabetes Paternal Grandfather     Heart Disease Maternal Grandmother     Stroke Maternal Aunt        REVIEW OF SYSTEMS:  CONSTITUTIONAL:  negative for increased fevers or chills  EYES:  negative for vision change  HEENT:  negative for acute hearing change  RESPIRATORY:  negative for acute shortness of breath  CARDIOVASCULAR:  negative for acute chest pain  GASTROINTESTINAL:  negative for nausea, vomiting  HEMATOLOGIC/LYMPHATIC:  negative for bleeding and petechiae  MUSCULOSKELETAL:  positive for right knee pain, swelling, right thigh swelling  NEUROLOGICAL:  negative for headaches, dizziness  BEHAVIOR/PSYCH:  negative for increased agitation and anxiety    PHYSICAL EXAM:    VITALS:  /68   Pulse 120   Temp 97.9 °F (36.6 °C)   Resp 18   Wt 299 Component Value Date    CRP 6.3 10/05/2021    SEDRATE 49 10/05/2021     Lactic Acid :   Lab Results   Component Value Date    LACTA 1.5 08/30/2021       Radiology Review:  10/08/21 - XR right knee  No grossly apparent fractures appreciated. Lateral plateau looks to be slightly depressed, this could be a chronic finding. Large knee effusion can be visualized on this x-ray. X-ray femur/tib-fib from 10/5/2021  No acute fractures or dislocations are appreciated. IMPRESSION:   · Right knee effusion, hemarthrosis    PLAN:  WBAT -right LE  Given the patient's past medical history it was elected to try to aspirate the knee fluid to see if there was any chance that it could be a septic knee joint. The patient was prepped with iodine, in a sterile fashion an 18-gauge needle was placed in the suprapatellar region, coagulated hematoma was aspirated. Roughly 5 cc was able to be aspirated. Patient tolerated the procedure well. A compressive wrap was placed over the right knee thereafter. We will await aspirate labs. Currently given the patient's physical exam findings it appears that he has a large knee effusion/hemarthrosis most likely secondary to minor trauma in the setting of anticoagulation. At this time there is minimal concern for compartment syndrome of the thigh however we will continue to monitor his situation. He was explained that he does have a hemarthrosis that will most likely have to resorb on its own. We will obtain a CT to R/O occult fracture. Pain Control per primary team  PT/OT  Consider heat and elevation to decrease swelling  No acute orthopedic interventions are planned. · Patient has significant vascular history as well including thrombosis of his vena cava, recommend vascular consultation.   · Discussed with Dr. Nimco Taylor

## 2021-10-08 NOTE — ED PROVIDER NOTES
Department of Emergency Medicine   ED  Provider Note  Admit Date/RoomTime: 10/8/2021  2:46 PM  ED Room: 31/31          History of Present Illness:  10/8/21, Time: 3:26 PM EDT  Chief Complaint   Patient presents with    Other     states his picc line came out approx 5 inches today while getting blood work       Frieda Apodaca is a 50 y.o. male presenting to the ED for dislodged PICC line. Apparently, the patient was having blood work done at home when the dressing was being changed thereafter by his home care nurse. Dislodged the PICC line about 5 inches. It is kinked as well. Additionally, the patient complains of right leg pain and swelling. He has swelling through his thigh as well as right knee and right calf region. Of note, the patient sustained some minor trauma couple of days ago. Of note, the patient was seen here in the emergency department had a plain film of the right knee as well as a duplex ultrasound of the right lower extremity. Was negative for DVT as well as fracture or dislocation. However, at the time the patient's INR was 6. He has discontinued taking his Coumadin since then. However, he has had increasing pain and swelling in the right lower extremity. He states the swelling of the right thigh seems actually a little bit better but the pain is significantly worse in his thigh as well as his knee. Denies any subsequent trauma. Denies any fevers chills or myalgias. He is currently on daptomycin therapy for a left lower extremity extensive cellulitis. Time his symptomatology is constant. Nothing makes it better or worse. It is moderate to severe in intensity. He is not taking any pain medication for it.     Review of Systems:  Constitutional: Denies fevers  Eyes: Denies blurry vision  ENT: Denies sore throat  Cardiovascular: Denies chest pain  Respiratory: Denies shortness of breath  Gastrointestinal: Denies abdominal pain  Genitourinary: Denies dysuria  Musculoskeletal: Positive for right leg pain  Integumentary: Denies rashes  Neurological: Denies headache    --------------------------------------------- PAST HISTORY ---------------------------------------------  Past Medical History:  has a past medical history of Accident, Acute thrombosis of inferior vena cava ([facility] [address] SIMÓN (acute kidney injury) ([facility] [address] Allergic rhinitis, Blister of left leg, Cellulitis of leg, left, Chronic back pain, Depression, Dermatophytosis, Diabetes mellitus ([address] Difficulty sleeping, Displacement of lumbar intervertebral disc without myelopathy, Fibromyalgia, Fractured rib, H/O seasonal allergies, Head injury, History of deep vein thrombosis, Hyperlipidemia, Hypertension, 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HCC), Lymphedema of left leg, Obesity (BMI 35.0-39.9 without comorbidity), Osteoarthritis, Recurrent acute deep vein thrombosis (DVT) of left lower extremity ([address] S/P IVC filter, Subtherapeutic international normalized ratio (INR), and Thoracic or lumbosacral neuritis or radiculitis, unspecified. Past Surgical History:  has a past surgical history that includes Neck surgery (2000); shoulder surgery (2001 &2007); Wrist surgery (2007); Rotator cuff repair (Left, 2014); hernia repair (2001); Shoulder arthroscopy (Left, 11 21 14); Vasectomy; Hip Arthroplasty (Left, 4/11/2016); Total hip arthroplasty (Right, 10/31/2016); Foot surgery (Right, 1985); pr colonoscopy flx dx w/collj spec when pfrmd (N/A, 5/7/2018); hernia repair (Right, 12/9/2019); CT PTC NEW ACCESS (8/3/2020); Upper gastrointestinal endoscopy (N/A, 9/4/2020); Colonoscopy (N/A, 9/5/2020); laparotomy (N/A, 9/18/2020); iliac artery stent insertion (N/A, 10/11/2020); THROMBECTOMY / EMBOLECTOMY FEMORAL (Left, 1/1/2021); THROMBECTOMY / EMBOLECTOMY FEMORAL (Left, 1/2/2021); and Vena Cava Filter Placement (Left, 1/3/2021). Social History:  reports that he has never smoked. His smokeless tobacco use includes chew. He reports previous alcohol use.  He reports that he does not use drugs. Family History: family history includes Arthritis in his father; Cancer in his maternal grandfather and paternal uncle; Diabetes in his father and paternal grandfather; Heart Disease in his maternal grandmother; Hypertension in his mother; Stroke in his maternal aunt. . Unless otherwise noted, family history is non contributory    The patients home medications have been reviewed. Allergies: Seasonal and Tramadol    I have reviewed the past medical history, past surgical history, social history, and family history    ---------------------------------------------------PHYSICAL EXAM--------------------------------------    Constitutional: Appears in no distress  Head: Normocephalic, atraumatic  Eyes: Non-icteric slcera, no conjunctival injection  ENT: Moist mucous membranes,  Neck: Trachea midline, no JVD  Respiratory: Nonlabored respirations. Lungs clear to auscultation bilaterally, no wheezes, rales, or rhonchi. Cardiovascular: Regular rate. Regular rhythm. No murmurs, no gallops, no rubs. Gastrointestinal: Abdomen Soft, Non tender, Non distended. No rebound tenderness, guarding, or rigidity. Extremities: No lower extremity edema  Genitourinary: No CVA tenderness, no suprapubic tenderness  Musculoskeletal: Left upper extremity PICC line partially dislodged, the line is kinked. Examination of the bilateral lower extremities reveals right thigh swelling on the right, all compartments of the right thigh as well as right calf are tense. He has mild passive range of motion tenderness. Additionally, he notes a significantly swollen right knee that is potentially worse than prior. He has restricted range of motion of the right knee and thigh because of pain. 2 out of 4 dorsalis pedis pulses on the right. However, patient does have a slightly diminished pulse in the left dorsalis pedis region but no complaints on this side. No pallor is noted to the right lower extremity. sec    INR 2.5    CK   Result Value Ref Range    Total  (H) 20 - 200 U/L   ,       RADIOLOGY:  Interpreted by Radiologist unless otherwise specified  XR KNEE RIGHT (3 VIEWS)    (Results Pending)         ------------------------- NURSING NOTES AND VITALS REVIEWED ---------------------------   The nursing notes within the ED encounter and vital signs as below have been reviewed by myself  /71   Pulse 105   Temp 97.9 °F (36.6 °C)   Resp 18   Wt 299 lb (135.6 kg)   SpO2 97%   BMI 39.45 kg/m²     Oxygen Saturation Interpretation: Normal    The patients available past medical records and past encounters were reviewed. ------------------------------ ED COURSE/MEDICAL DECISION MAKING----------------------  Medications   0.9 % sodium chloride bolus (1,000 mLs IntraVENous New Bag 10/8/21 8771)   sodium chloride flush 0.9 % injection 5-40 mL (has no administration in time range)   sodium chloride flush 0.9 % injection 5-40 mL (has no administration in time range)   0.9 % sodium chloride infusion (has no administration in time range)   enoxaparin (LOVENOX) injection 40 mg (has no administration in time range)   ondansetron (ZOFRAN-ODT) disintegrating tablet 4 mg (has no administration in time range)     Or   ondansetron (ZOFRAN) injection 4 mg (has no administration in time range)   polyethylene glycol (GLYCOLAX) packet 17 g (has no administration in time range)   acetaminophen (TYLENOL) tablet 650 mg (has no administration in time range)     Or   acetaminophen (TYLENOL) suppository 650 mg (has no administration in time range)   HYDROcodone-acetaminophen (NORCO) 5-325 MG per tablet 2 tablet (2 tablets Oral Given 10/8/21 1870)   0.9 % sodium chloride bolus (1,000 mLs IntraVENous New Bag 10/8/21 6163)        Re-Evaluations:          This patient's ED course included:a personal history and physicial examination, re-evaluation prior to disposition, multiple bedside re-evaluations and IV medications    This patient has remained hemodynamically stable during their ED course. Consultations:  Internal Medicine  Orthopedics    Medical Decision Making:   Patient presents with left upper extremity PICC line dislodgment as well as right lower extremity pain. Vital signs interpreted by myself as tachycardic at 120 I did perform chart review and it appears that the patient is not quite always tachycardic like this. With this consider abnormal for him. History and physical examination findings concerning for right lower extremity compartment syndrome versus knee hemarthrosis secondary to elevated INR. Will hold off on testing any compartment pressures at this time as the patient is on Coumadin. Additionally, the PICC team has been consulted for PICC line replacement. We will check a CPK as well. Labs: Patient has an SIMÓN on laboratory studies. He is given IV fluid. He also has hyponatremia of 124. He has an anion gap metabolic acidosis that actually was noted last time. Elevated CPK possibly secondary to daptomycin. I spoke with orthopedics to evaluate the patient's right lower extremity. Patient will be admitted for IV hydration he has had his PICC line replaced and will receive his IV antibiotics and additionally for serial exams of the right lower extremity as he has a significant hemarthrosis with elevated INR. Counseling: The emergency provider has spoken with the patient and discussed todays results, in addition to providing specific details for the plan of care and counseling regarding the diagnosis and prognosis. Questions are answered at this time and they are agreeable with the plan.       --------------------------------- IMPRESSION AND DISPOSITION ---------------------------------    IMPRESSION  1. Pain and swelling of right knee    2. Acute kidney injury ([address]    3.  Displacement of peripherally inserted central catheter (PICC) ([facility] [address]        DISPOSITION  Disposition: Admit to med/surg floor  Patient condition is stable    IDr. Roya, am the primary provider of record    Roya Ansari DO  Emergency Medicine    NOTE: This report was transcribed using voice recognition software.  Every effort was made to ensure accuracy; however, inadvertent computerized transcription errors may be present         Rosa Maria Barrera DO  10/08/21 190

## 2021-10-08 NOTE — PROGRESS NOTES
POWER CHG PICC 1 LUMEN Placement 10/8/2021    Product number: [id number]   Lot Number: [id number]      Ultrasound: YES WITH VPS TIP CONFIRMATION   Right Brachial vein:                Upper Arm Circumference: 33    Size: 4.5    Exposed Length: 4    Internal Length: 43   Cut: 8   Vein Measurement: 0.65    Dk Cardenas RN  10/8/2021  5:15 PM

## 2021-10-09 LAB
ANION GAP SERPL CALCULATED.3IONS-SCNC: 10 MMOL/L (ref 7–16)
BASOPHILS ABSOLUTE: 0.08 E9/L (ref 0–0.2)
BASOPHILS RELATIVE PERCENT: 0.7 % (ref 0–2)
BUN BLDV-MCNC: 26 MG/DL (ref 6–20)
CALCIUM SERPL-MCNC: 8.8 MG/DL (ref 8.6–10.2)
CHLORIDE BLD-SCNC: 99 MMOL/L (ref 98–107)
CO2: 24 MMOL/L (ref 22–29)
CREAT SERPL-MCNC: 1.3 MG/DL (ref 0.7–1.2)
EOSINOPHILS ABSOLUTE: 0.43 E9/L (ref 0.05–0.5)
EOSINOPHILS RELATIVE PERCENT: 3.8 % (ref 0–6)
GFR AFRICAN AMERICAN: >60
GFR NON-AFRICAN AMERICAN: 59 ML/MIN/1.73
GLUCOSE BLD-MCNC: 154 MG/DL (ref 74–99)
GRAM STAIN ORDERABLE: NORMAL
HCT VFR BLD CALC: 27.2 % (ref 37–54)
HEMOGLOBIN: 9.1 G/DL (ref 12.5–16.5)
IMMATURE GRANULOCYTES #: 0.05 E9/L
IMMATURE GRANULOCYTES %: 0.4 % (ref 0–5)
LYMPHOCYTES ABSOLUTE: 1.75 E9/L (ref 1.5–4)
LYMPHOCYTES RELATIVE PERCENT: 15.4 % (ref 20–42)
MCH RBC QN AUTO: 30.3 PG (ref 26–35)
MCHC RBC AUTO-ENTMCNC: 33.5 % (ref 32–34.5)
MCV RBC AUTO: 90.7 FL (ref 80–99.9)
METER GLUCOSE: 134 MG/DL (ref 74–99)
METER GLUCOSE: 135 MG/DL (ref 74–99)
METER GLUCOSE: 144 MG/DL (ref 74–99)
METER GLUCOSE: 151 MG/DL (ref 74–99)
MONOCYTES ABSOLUTE: 1.42 E9/L (ref 0.1–0.95)
MONOCYTES RELATIVE PERCENT: 12.5 % (ref 2–12)
NEUTROPHILS ABSOLUTE: 7.63 E9/L (ref 1.8–7.3)
NEUTROPHILS RELATIVE PERCENT: 67.2 % (ref 43–80)
PDW BLD-RTO: 14.6 FL (ref 11.5–15)
PLATELET # BLD: 563 E9/L (ref 130–450)
PMV BLD AUTO: 9.1 FL (ref 7–12)
POTASSIUM REFLEX MAGNESIUM: 3.8 MMOL/L (ref 3.5–5)
RBC # BLD: 3 E12/L (ref 3.8–5.8)
SODIUM BLD-SCNC: 133 MMOL/L (ref 132–146)
WBC # BLD: 11.4 E9/L (ref 4.5–11.5)

## 2021-10-09 PROCEDURE — G0378 HOSPITAL OBSERVATION PER HR: HCPCS

## 2021-10-09 PROCEDURE — 6360000002 HC RX W HCPCS: Performed by: STUDENT IN AN ORGANIZED HEALTH CARE EDUCATION/TRAINING PROGRAM

## 2021-10-09 PROCEDURE — 82962 GLUCOSE BLOOD TEST: CPT

## 2021-10-09 PROCEDURE — 96366 THER/PROPH/DIAG IV INF ADDON: CPT

## 2021-10-09 PROCEDURE — 36415 COLL VENOUS BLD VENIPUNCTURE: CPT

## 2021-10-09 PROCEDURE — 6370000000 HC RX 637 (ALT 250 FOR IP): Performed by: FAMILY MEDICINE

## 2021-10-09 PROCEDURE — 85025 COMPLETE CBC W/AUTO DIFF WBC: CPT

## 2021-10-09 PROCEDURE — 2580000003 HC RX 258: Performed by: STUDENT IN AN ORGANIZED HEALTH CARE EDUCATION/TRAINING PROGRAM

## 2021-10-09 PROCEDURE — 80048 BASIC METABOLIC PNL TOTAL CA: CPT

## 2021-10-09 PROCEDURE — 6370000000 HC RX 637 (ALT 250 FOR IP): Performed by: STUDENT IN AN ORGANIZED HEALTH CARE EDUCATION/TRAINING PROGRAM

## 2021-10-09 PROCEDURE — 96372 THER/PROPH/DIAG INJ SC/IM: CPT

## 2021-10-09 RX ORDER — SODIUM CHLORIDE 9 MG/ML
INJECTION, SOLUTION INTRAVENOUS CONTINUOUS
Status: DISCONTINUED | OUTPATIENT
Start: 2021-10-09 | End: 2021-10-13 | Stop reason: HOSPADM

## 2021-10-09 RX ORDER — WARFARIN SODIUM 7.5 MG/1
7.5 TABLET ORAL DAILY
Status: DISCONTINUED | OUTPATIENT
Start: 2021-10-09 | End: 2021-10-13 | Stop reason: HOSPADM

## 2021-10-09 RX ADMIN — HYDROCODONE BITARTRATE AND ACETAMINOPHEN 1 TABLET: 5; 325 TABLET ORAL at 22:50

## 2021-10-09 RX ADMIN — METOPROLOL SUCCINATE 25 MG: 25 TABLET, EXTENDED RELEASE ORAL at 10:19

## 2021-10-09 RX ADMIN — SODIUM CHLORIDE: 9 INJECTION, SOLUTION INTRAVENOUS at 20:18

## 2021-10-09 RX ADMIN — HYDROCODONE BITARTRATE AND ACETAMINOPHEN 1 TABLET: 5; 325 TABLET ORAL at 01:10

## 2021-10-09 RX ADMIN — ENOXAPARIN SODIUM 40 MG: 40 INJECTION SUBCUTANEOUS at 09:01

## 2021-10-09 RX ADMIN — HYDROXYUREA 500 MG: 500 CAPSULE ORAL at 09:02

## 2021-10-09 RX ADMIN — WARFARIN SODIUM 7.5 MG: 7.5 TABLET ORAL at 17:23

## 2021-10-09 RX ADMIN — Medication 10 ML: at 20:20

## 2021-10-09 RX ADMIN — INSULIN GLARGINE 25 UNITS: 100 INJECTION, SOLUTION SUBCUTANEOUS at 20:21

## 2021-10-09 RX ADMIN — HYDROCODONE BITARTRATE AND ACETAMINOPHEN 1 TABLET: 5; 325 TABLET ORAL at 10:19

## 2021-10-09 RX ADMIN — Medication 3 MG: at 01:10

## 2021-10-09 RX ADMIN — DAPTOMYCIN 600 MG: 500 INJECTION, POWDER, LYOPHILIZED, FOR SOLUTION INTRAVENOUS at 21:01

## 2021-10-09 RX ADMIN — HYDROCODONE BITARTRATE AND ACETAMINOPHEN 1 TABLET: 5; 325 TABLET ORAL at 05:34

## 2021-10-09 RX ADMIN — Medication 10 ML: at 09:01

## 2021-10-09 RX ADMIN — SODIUM CHLORIDE: 9 INJECTION, SOLUTION INTRAVENOUS at 10:19

## 2021-10-09 RX ADMIN — LISINOPRIL 5 MG: 10 TABLET ORAL at 09:02

## 2021-10-09 RX ADMIN — LOPERAMIDE HYDROCHLORIDE 2 MG: 2 CAPSULE ORAL at 10:30

## 2021-10-09 RX ADMIN — HYDROCODONE BITARTRATE AND ACETAMINOPHEN 1 TABLET: 5; 325 TABLET ORAL at 14:41

## 2021-10-09 RX ADMIN — HYDROXYUREA 500 MG: 500 CAPSULE ORAL at 20:17

## 2021-10-09 RX ADMIN — HYDROCODONE BITARTRATE AND ACETAMINOPHEN 1 TABLET: 5; 325 TABLET ORAL at 18:42

## 2021-10-09 RX ADMIN — LOPERAMIDE HYDROCHLORIDE 2 MG: 2 CAPSULE ORAL at 22:54

## 2021-10-09 ASSESSMENT — PAIN SCALES - GENERAL
PAINLEVEL_OUTOF10: 10
PAINLEVEL_OUTOF10: 10
PAINLEVEL_OUTOF10: 9
PAINLEVEL_OUTOF10: 8
PAINLEVEL_OUTOF10: 10
PAINLEVEL_OUTOF10: 9
PAINLEVEL_OUTOF10: 8

## 2021-10-09 NOTE — PROGRESS NOTES
Hospitalist Progress Note      PCP: Scottie Chavez, APRN - CNP    Date of Admission: 10/8/2021    Chief Complaint: knee pain    Subjective: Patient was een at bedside and explained about the plan regarding monitoring kidney function. Patient states that her knee pain has been stable along with the swelling. Patient had no acute questions or concerns at this time. Medications:  Reviewed    Infusion Medications    sodium chloride 100 mL/hr at 10/09/21 1019    sodium chloride      dextrose       Scheduled Medications    sodium chloride flush  5-40 mL IntraVENous 2 times per day    enoxaparin  40 mg SubCUTAneous Daily    hydroxyurea  500 mg Oral BID    insulin glargine  25 Units SubCUTAneous Nightly    lisinopril  5 mg Oral Daily    metoprolol succinate  25 mg Oral Daily    daptomycin (CUBICIN) IVPB  600 mg IntraVENous Q24H     PRN Meds: sodium chloride flush, sodium chloride, ondansetron **OR** ondansetron, polyethylene glycol, acetaminophen **OR** acetaminophen, cyclobenzaprine, loperamide, melatonin, glucose, dextrose, glucagon (rDNA), dextrose, HYDROcodone 5 mg - acetaminophen      Intake/Output Summary (Last 24 hours) at 10/9/2021 1456  Last data filed at 10/9/2021 8297  Gross per 24 hour   Intake 960 ml   Output 2300 ml   Net -1340 ml       Exam:    /80   Pulse 96   Temp 98.1 °F (36.7 °C) (Oral)   Resp 18   Ht 6' 1\" (1.854 m)   Wt 297 lb 11.2 oz (135 kg)   SpO2 98%   BMI 39.28 kg/m²     General appearance: No apparent distress, appears stated age and cooperative. HEENT: Pupils equal, round, and reactive to light. Conjunctivae/corneas clear. Neck: Supple, with full range of motion. No jugular venous distention. Trachea midline. Respiratory:  Normal respiratory effort. Clear to auscultation, bilaterally without Rales/Wheezes/Rhonchi. Cardiovascular: Regular rate and rhythm with normal S1/S2 without murmurs, rubs or gallops.   Abdomen: Soft, non-tender, non-distended with normal bowel sounds. Musculoskeletal: Right knee-Compressive wrap in place. Patient with decreased flexion actively. No pain out of proportion. No increase in swelling noticed. Skin: Skin color, texture, turgor normal.  No rashes or lesions. Neurologic:  Neurovascularly intact without any focal sensory/motor deficits. Cranial nerves: II-XII intact, grossly non-focal.  Psychiatric: Alert and oriented, thought content appropriate, normal insight    Labs:   Recent Labs     10/08/21  1335 10/08/21  1544 10/09/21  1137   WBC 17.8* 18.2* 11.4   HGB 10.4* 10.4* 9.1*   HCT 32.1* 31.8* 27.2*   * 570* 563*     Recent Labs     10/08/21  1335 10/08/21  1544 10/09/21  1137   * 124* 133   K 3.8 4.4 3.8   CL 86* 86* 99   CO2 17* 18* 24   BUN 42* 40* 26*   CREATININE 2.6* 2.3* 1.3*   CALCIUM 9.4 9.6 8.8     Recent Labs     10/08/21  1335   AST 36   ALT 23   BILITOT 0.6   ALKPHOS 145*     Recent Labs     10/08/21  1544   INR 2.5     Recent Labs     10/08/21  1335 10/08/21  1544   CKTOTAL 603* 638*       Assessment/Plan:    Active Hospital Problems    Diagnosis Date Noted    Acute kidney injury (SIMÓN) with acute tubular necrosis (ATN) ([facility] [address] [N17.0] 10/08/2021   Recent history of left lower extremity cellulitis-Staph warneri bacteremia-on daptomycin  History of recurrent DVT  History of thrombocythemia  History of occlusive DVT of the left iliac and common femoral veins  Insulin-dependent diabetes  Hypertension  Hyperlipidemia  Chronic depression  Hyponatremia    -Continue on IV fluids. Creatinine has improved from 2.3-1.3. We will continue to monitor  -Strict intake and output  -We will continue daptomycin [until 10-] for his left lower extremity cellulitis. Examination shows no erythema/tenderness of left lower extremity.  -Right knee shows some swelling, mild tenderness much improved.   -On examination pulses present distally and low suspicion for compartment syndrome at this time  -CT of the knee has been ordered which showed a large hemarthrosis and minimal tricompartmental degenerative changes no evidence of fracture or malalignment. -INR 2.5, will continue warfarin as no intervention planned at this time and trauma having occurred 1 week back. We will continue serial examinations to see if hemarthrosis evolves  --Orthopedics consulted-appreciate recommendations-no intervention necessary at this time  -Will be placed on sliding scale insulin along with continuing basal Lantus 25 units  -[id number] will continue fluids at this time, urine sodium, urine osm ordered  -X-ray of the femur-right reviewed from 10/5/2021 shows joint effusion of the right knee-shows no fractures in the tibia/fibula.  -We will continue home medications for depression, hyperlipidemia, hypertension     DVT Prophylaxis-Home Warfarin. Diet: ADULT DIET; Regular; 3 carb choices (45 gm/meal); No Concentrated sweets  Code Status: Full Code    Disposition-awaiting renal function to improve-monitoring hemarthrosis/knee swelling as patient on warfarin.   Marycruz Hawkins MD

## 2021-10-09 NOTE — PROGRESS NOTES
Patient seen and examined at bedside this evening. He was resting upon examination. He states his pain has not changed substantially from previous examinations. Pain remains to the anterior aspect of the right thigh, the lateral aspect a lesser degree, and the knee. He still has difficulty with motion of the knee. No changes in sensation to the foot. No additional complaints. Physical exam  ACE bandage in place about the right knee  Patient tolerates passive and active plantar flexion dorsiflexion of the ankle without pain  Painful active and passive range of motion of the knee, range of motion remains limited at this time, patient unable to achieve 90 degrees of flexion  Firmness to palpation of the lateral aspect of the thigh near the knee, firmness to a lesser degree of the anterior compartment. Palpation of the more proximal thigh is soft and compressible  Some muscle spasm noted with range of motion of the knee  Dorsalis pedis and posterior tibial pulses palpable, +2/4  Sensation to the foot while subjectively diminished per patient is grossly intact consistent with previous exams      At this time, with unchanged physical examination, there remains a low concern for any developing compartment syndrome. Appreciate PT/OT evaluation and treatment. Weightbearing as tolerated to the right lower extremity. No acute interventions at this time from an orthopedic surgery perspective.

## 2021-10-09 NOTE — PROGRESS NOTES
Department of Orthopedic Surgery  Resident Progress Note    Patient seen and examined. Pain consistent with last exam.  Explained current lab results appear negative for septic joint. CT showing large hemarthrosis without fracture. No new complaints. Denies chest pain, shortness of breath, dizziness/lightheadedness. Sitting in bed this morning eating. VITALS:  BP (!) 156/84   Pulse 117   Temp 100 °F (37.8 °C) (Oral)   Resp 16   Ht 6' 1\" (1.854 m)   Wt 297 lb 11.2 oz (135 kg)   SpO2 100%   BMI 39.28 kg/m²     General: alert and oriented to person, place and time, well-developed and well-nourished, in no acute distress    MUSCULOSKELETAL:   right lower extremity:  · Compressive wrap in place. Patient with decreased flexion actively. ROM from 10-45 degrees. · No pain out of proportion to examination with passive ROM to the ankle, knee or hip. · Compartments compressible  · +PF/DF/EHL  · +2/4 DP & PT pulses, Brisk Cap refill, Toes warm and perfused  · Distal sensation grossly diminished however subjectively intact to Peroneals, Sural, Saphenous, and tibial nrs. Patient able to straight leg raise. CBC:   Lab Results   Component Value Date    WBC 18.2 10/08/2021    HGB 10.4 10/08/2021    HCT 31.8 10/08/2021     10/08/2021     PT/INR:    Lab Results   Component Value Date    PROTIME 26.7 10/08/2021    PROTIME 38.0 06/07/2021    INR 2.5 10/08/2021       Results for Eliseo Oliveira (MRN [id number]) as of 10/9/2021 06:34   Ref. Range 10/8/2021 19:00 10/8/2021 19:30 10/8/2021 20:55 10/8/2021 21:35 10/9/2021 05:36   Nucl Cell, Fluid Latest Units: /uL 11,100           ASSESSMENT  · Right knee hemarthrosis. · Thigh pain    PLAN      · Continue physical therapy and protocol: WBAT - RLE  · Currently patient has no signs or symptoms of septic joint. Aspirate shows 11,000 nucleic cells which is not concerning for septic joint process. We would normally see greater than 50,000 nucleic cells.   He does however have a large hemarthrosis that will slowly resorb on its own. Continue to have little concern for compartment syndrome at this time. We will continue to monitor peripherally and adjust treatments as warranted. · Deep venous thrombosis prophylaxis -per primary team, early mobilization  · No acute orthopedic surgical interventions are planned at this time.   · PT/OT as able  · Pain Control: IV and PO  · Monitor the rest of aspirate labs  · D/C Plan: Per medical team.

## 2021-10-09 NOTE — HOME CARE
Patient is active with [facility] for skilled nursing. Will need home care orders if patient is admitted to inpatient.  Keyur Jett lpn

## 2021-10-10 LAB
ANION GAP SERPL CALCULATED.3IONS-SCNC: 8 MMOL/L (ref 7–16)
BASOPHILS ABSOLUTE: 0.1 E9/L (ref 0–0.2)
BASOPHILS RELATIVE PERCENT: 0.8 % (ref 0–2)
BUN BLDV-MCNC: 20 MG/DL (ref 6–20)
CALCIUM SERPL-MCNC: 9.1 MG/DL (ref 8.6–10.2)
CHLORIDE BLD-SCNC: 103 MMOL/L (ref 98–107)
CO2: 27 MMOL/L (ref 22–29)
CREAT SERPL-MCNC: 1 MG/DL (ref 0.7–1.2)
EOSINOPHILS ABSOLUTE: 0.51 E9/L (ref 0.05–0.5)
EOSINOPHILS RELATIVE PERCENT: 4.1 % (ref 0–6)
GFR AFRICAN AMERICAN: >60
GFR NON-AFRICAN AMERICAN: >60 ML/MIN/1.73
GLUCOSE BLD-MCNC: 116 MG/DL (ref 74–99)
HCT VFR BLD CALC: 27.6 % (ref 37–54)
HEMOGLOBIN: 8.8 G/DL (ref 12.5–16.5)
HYPOCHROMIA: ABNORMAL
IMMATURE GRANULOCYTES #: 0.05 E9/L
IMMATURE GRANULOCYTES %: 0.4 % (ref 0–5)
LACTIC ACID: 2.3 MMOL/L (ref 0.5–2.2)
LYMPHOCYTES ABSOLUTE: 2.23 E9/L (ref 1.5–4)
LYMPHOCYTES RELATIVE PERCENT: 17.8 % (ref 20–42)
MCH RBC QN AUTO: 29.6 PG (ref 26–35)
MCHC RBC AUTO-ENTMCNC: 31.9 % (ref 32–34.5)
MCV RBC AUTO: 92.9 FL (ref 80–99.9)
METER GLUCOSE: 110 MG/DL (ref 74–99)
METER GLUCOSE: 114 MG/DL (ref 74–99)
METER GLUCOSE: 133 MG/DL (ref 74–99)
METER GLUCOSE: 165 MG/DL (ref 74–99)
MONOCYTES ABSOLUTE: 1.57 E9/L (ref 0.1–0.95)
MONOCYTES RELATIVE PERCENT: 12.5 % (ref 2–12)
NEUTROPHILS ABSOLUTE: 8.07 E9/L (ref 1.8–7.3)
NEUTROPHILS RELATIVE PERCENT: 64.4 % (ref 43–80)
OVALOCYTES: ABNORMAL
PDW BLD-RTO: 15 FL (ref 11.5–15)
PLATELET # BLD: 584 E9/L (ref 130–450)
PMV BLD AUTO: 9.3 FL (ref 7–12)
POIKILOCYTES: ABNORMAL
POLYCHROMASIA: ABNORMAL
POTASSIUM REFLEX MAGNESIUM: 3.9 MMOL/L (ref 3.5–5)
PROCALCITONIN: 0.18 NG/ML (ref 0–0.08)
RBC # BLD: 2.97 E12/L (ref 3.8–5.8)
SCHISTOCYTES: ABNORMAL
SODIUM BLD-SCNC: 138 MMOL/L (ref 132–146)
TARGET CELLS: ABNORMAL
WBC # BLD: 12.5 E9/L (ref 4.5–11.5)

## 2021-10-10 PROCEDURE — 96372 THER/PROPH/DIAG INJ SC/IM: CPT

## 2021-10-10 PROCEDURE — 84145 PROCALCITONIN (PCT): CPT

## 2021-10-10 PROCEDURE — 85025 COMPLETE CBC W/AUTO DIFF WBC: CPT

## 2021-10-10 PROCEDURE — G0378 HOSPITAL OBSERVATION PER HR: HCPCS

## 2021-10-10 PROCEDURE — 36415 COLL VENOUS BLD VENIPUNCTURE: CPT

## 2021-10-10 PROCEDURE — 80048 BASIC METABOLIC PNL TOTAL CA: CPT

## 2021-10-10 PROCEDURE — 6360000002 HC RX W HCPCS: Performed by: STUDENT IN AN ORGANIZED HEALTH CARE EDUCATION/TRAINING PROGRAM

## 2021-10-10 PROCEDURE — 96366 THER/PROPH/DIAG IV INF ADDON: CPT

## 2021-10-10 PROCEDURE — 83605 ASSAY OF LACTIC ACID: CPT

## 2021-10-10 PROCEDURE — 82962 GLUCOSE BLOOD TEST: CPT

## 2021-10-10 PROCEDURE — 6370000000 HC RX 637 (ALT 250 FOR IP): Performed by: STUDENT IN AN ORGANIZED HEALTH CARE EDUCATION/TRAINING PROGRAM

## 2021-10-10 PROCEDURE — 2580000003 HC RX 258: Performed by: STUDENT IN AN ORGANIZED HEALTH CARE EDUCATION/TRAINING PROGRAM

## 2021-10-10 PROCEDURE — 6370000000 HC RX 637 (ALT 250 FOR IP): Performed by: FAMILY MEDICINE

## 2021-10-10 RX ADMIN — HYDROCODONE BITARTRATE AND ACETAMINOPHEN 1 TABLET: 5; 325 TABLET ORAL at 07:20

## 2021-10-10 RX ADMIN — HYDROCODONE BITARTRATE AND ACETAMINOPHEN 1 TABLET: 5; 325 TABLET ORAL at 12:07

## 2021-10-10 RX ADMIN — HYDROXYUREA 500 MG: 500 CAPSULE ORAL at 09:03

## 2021-10-10 RX ADMIN — METOPROLOL SUCCINATE 25 MG: 25 TABLET, EXTENDED RELEASE ORAL at 09:02

## 2021-10-10 RX ADMIN — WARFARIN SODIUM 7.5 MG: 7.5 TABLET ORAL at 17:50

## 2021-10-10 RX ADMIN — HYDROCODONE BITARTRATE AND ACETAMINOPHEN 1 TABLET: 5; 325 TABLET ORAL at 20:15

## 2021-10-10 RX ADMIN — HYDROCODONE BITARTRATE AND ACETAMINOPHEN 1 TABLET: 5; 325 TABLET ORAL at 02:56

## 2021-10-10 RX ADMIN — HYDROXYUREA 500 MG: 500 CAPSULE ORAL at 20:15

## 2021-10-10 RX ADMIN — LOPERAMIDE HYDROCHLORIDE 2 MG: 2 CAPSULE ORAL at 20:15

## 2021-10-10 RX ADMIN — Medication 5 ML: at 20:21

## 2021-10-10 RX ADMIN — INSULIN GLARGINE 25 UNITS: 100 INJECTION, SOLUTION SUBCUTANEOUS at 20:17

## 2021-10-10 RX ADMIN — ENOXAPARIN SODIUM 40 MG: 40 INJECTION SUBCUTANEOUS at 09:03

## 2021-10-10 RX ADMIN — LISINOPRIL 5 MG: 10 TABLET ORAL at 09:03

## 2021-10-10 RX ADMIN — DAPTOMYCIN 600 MG: 500 INJECTION, POWDER, LYOPHILIZED, FOR SOLUTION INTRAVENOUS at 20:15

## 2021-10-10 RX ADMIN — HYDROCODONE BITARTRATE AND ACETAMINOPHEN 1 TABLET: 5; 325 TABLET ORAL at 16:13

## 2021-10-10 ASSESSMENT — PAIN SCALES - GENERAL
PAINLEVEL_OUTOF10: 10
PAINLEVEL_OUTOF10: 8
PAINLEVEL_OUTOF10: 6
PAINLEVEL_OUTOF10: 9
PAINLEVEL_OUTOF10: 10
PAINLEVEL_OUTOF10: 9
PAINLEVEL_OUTOF10: 5

## 2021-10-10 ASSESSMENT — PAIN DESCRIPTION - LOCATION: LOCATION: KNEE

## 2021-10-10 ASSESSMENT — PAIN DESCRIPTION - ONSET: ONSET: ON-GOING

## 2021-10-10 ASSESSMENT — PAIN DESCRIPTION - DESCRIPTORS: DESCRIPTORS: CONSTANT;DISCOMFORT;SORE

## 2021-10-10 ASSESSMENT — PAIN DESCRIPTION - PAIN TYPE: TYPE: ACUTE PAIN

## 2021-10-10 ASSESSMENT — PAIN DESCRIPTION - FREQUENCY: FREQUENCY: CONTINUOUS

## 2021-10-10 ASSESSMENT — PAIN DESCRIPTION - ORIENTATION: ORIENTATION: RIGHT

## 2021-10-10 NOTE — PROGRESS NOTES
Department of Orthopedic Surgery  Resident Progress Note    Patient seen and examined. He is still having some pain to the right lower extremity. He has difficulty with ambulation requiring assistive devices. No worsening of pain from previous exams. No changes in sensation. No chest pain or shortness of breath. No nausea or vomiting. No fevers or chills. VITALS:  /75   Pulse 98   Temp 99 °F (37.2 °C) (Temporal)   Resp 16   Ht 6' 1\" (1.854 m)   Wt 297 lb 11.2 oz (135 kg)   SpO2 100%   BMI 39.28 kg/m²     General: alert and oriented to person, place and time, well-developed and well-nourished, in no acute distress    MUSCULOSKELETAL:   right lower extremity:  · Compressive wrap in place. Decreased active and passive motion of the knee  · No pain out of proportion to examination with passive ROM to the ankle, knee or hip. · Compartments compressible  · +PF/DF/EHL  · +2/4 DP & PT pulses, Brisk Cap refill, Toes warm and perfused  · Distal sensation grossly diminished however subjectively intact to Peroneals, Sural, Saphenous, and tibial nrs    CBC:   Lab Results   Component Value Date    WBC 12.5 10/10/2021    HGB 8.8 10/10/2021    HCT 27.6 10/10/2021     10/10/2021     PT/INR:    Lab Results   Component Value Date    PROTIME 26.7 10/08/2021    PROTIME 38.0 06/07/2021    INR 2.5 10/08/2021       Results for Colen Gist (MRN [id number]) as of 10/9/2021 06:34   Ref. Range 10/8/2021 19:00 10/8/2021 19:30 10/8/2021 20:55 10/8/2021 21:35 10/9/2021 05:36   Nucl Cell, Fluid Latest Units: /uL 11,100           ASSESSMENT  · Right knee hemarthrosis. · Thigh pain    PLAN      · Continue physical therapy and protocol: WBAT - RLE  · Deep venous thrombosis prophylaxis -per primary team, early mobilization  · No acute orthopedic surgical interventions are planned at this time. · PT/OT as able  · Pain Control: IV and PO  · Appreciate continued medical care.   Orthopedic surgery will follow peripherally at this time. Should any acute needs arise we will reevaluate and treat as indicated.   · D/C Plan: Per medical team.

## 2021-10-10 NOTE — PROGRESS NOTES
Hospitalist Progress Note      PCP: IGOR Amaro - CNP    Date of Admission: 10/8/2021    Chief Complaint: knee pain    Subjective: Patient was seen at bedside and explained about the plan regarding awaiting blood c/s as his infectious markers are elevated. Patient states that his knee pain has been stable along with the swelling. Patient had no acute questions or concerns at this time. Medications:  Reviewed    Infusion Medications    sodium chloride 100 mL/hr at 10/09/21 2018    sodium chloride      dextrose       Scheduled Medications    warfarin  7.5 mg Oral Daily    sodium chloride flush  5-40 mL IntraVENous 2 times per day    enoxaparin  40 mg SubCUTAneous Daily    hydroxyurea  500 mg Oral BID    insulin glargine  25 Units SubCUTAneous Nightly    lisinopril  5 mg Oral Daily    metoprolol succinate  25 mg Oral Daily    daptomycin (CUBICIN) IVPB  600 mg IntraVENous Q24H     PRN Meds: sodium chloride flush, sodium chloride, ondansetron **OR** ondansetron, polyethylene glycol, acetaminophen **OR** acetaminophen, cyclobenzaprine, loperamide, melatonin, glucose, dextrose, glucagon (rDNA), dextrose, HYDROcodone 5 mg - acetaminophen      Intake/Output Summary (Last 24 hours) at 10/10/2021 1227  Last data filed at 10/10/2021 0907  Gross per 24 hour   Intake --   Output 1500 ml   Net -1500 ml       Exam:    /75   Pulse 98   Temp 99 °F (37.2 °C) (Temporal)   Resp 16   Ht 6' 1\" (1.854 m)   Wt 297 lb 11.2 oz (135 kg)   SpO2 100%   BMI 39.28 kg/m²     General appearance: No apparent distress, appears stated age and cooperative. HEENT: Pupils equal, round, and reactive to light. Conjunctivae/corneas clear. Neck: Supple, with full range of motion. No jugular venous distention. Trachea midline. Respiratory:  Normal respiratory effort. Clear to auscultation, bilaterally without Rales/Wheezes/Rhonchi.   Cardiovascular: Regular rate and rhythm with normal S1/S2 without murmurs, rubs or gallops. Abdomen: Soft, non-tender, non-distended with normal bowel sounds. Musculoskeletal: Right knee-Compressive wrap in place. Patient with decreased flexion actively. No pain out of proportion. No increase in swelling noticed. Skin: Skin color, texture, turgor normal.  No rashes or lesions. Neurologic:  Neurovascularly intact without any focal sensory/motor deficits. Cranial nerves: II-XII intact, grossly non-focal.  Psychiatric: Alert and oriented, thought content appropriate, normal insight    Labs:   Recent Labs     10/08/21  1544 10/09/21  1137 10/10/21  0002   WBC 18.2* 11.4 12.5*   HGB 10.4* 9.1* 8.8*   HCT 31.8* 27.2* 27.6*   * 563* 584*     Recent Labs     10/08/21  1544 10/09/21  1137 10/10/21  0002   * 133 138   K 4.4 3.8 3.9   CL 86* 99 103   CO2 18* 24 27   BUN 40* 26* 20   CREATININE 2.3* 1.3* 1.0   CALCIUM 9.6 8.8 9.1     Recent Labs     10/08/21  1335   AST 36   ALT 23   BILITOT 0.6   ALKPHOS 145*     Recent Labs     10/08/21  1544   INR 2.5     Recent Labs     10/08/21  1335 10/08/21  1544   CKTOTAL 603* 638*       Assessment/Plan:    Active Hospital Problems    Diagnosis Date Noted    Acute kidney injury (SIMÓN) with acute tubular necrosis ([facility]) [facility] [N17.0] 10/08/2021   Recent history of left lower extremity cellulitis-Staph warneri bacteremia-on daptomycin  History of recurrent DVT  History of thrombocythemia  History of occlusive DVT of the left iliac and common femoral veins  Insulin-dependent diabetes  Hypertension  Hyperlipidemia  Chronic depression  Hyponatremia-resolved    -Continue on IV fluids. Creatinine has improved from 2.3-1.3-->1.0. We will continue to monitor  - lactic acid at 2.3, Procalc @ 0.18, WBC elevated at 13. 2. low grade fevers. Blood cultures have been ordered- pending. on daptomycin  -We will continue daptomycin [until 10-] for his left lower extremity cellulitis.  Examination shows no erythema/tenderness of left lower extremity.  -Right knee shows some swelling, mild tenderness much improved. -On examination pulses present distally and low suspicion for compartment syndrome at this time  -CT of the knee has been ordered which showed a large hemarthrosis and minimal tricompartmental degenerative changes no evidence of fracture or malalignment.  -Strict intake and output  -INR 2.5, will continue warfarin as no intervention planned at this time and trauma having occurred 1 week back. We will continue serial examinations to see if hemarthrosis evolves  --Orthopedics consulted-appreciate recommendations-no intervention necessary at this time  -Will be placed on sliding scale insulin along with continuing basal Lantus 25 units  -Gabbdvxsjdmw-jifhrlnfc-mt will continue fluids at this time, urine sodium, urine osm ordered  -X-ray of the femur-right reviewed from 10/5/2021 shows joint effusion of the right knee-shows no fractures in the tibia/fibula.  -We will continue home medications for depression, hyperlipidemia, hypertension     DVT Prophylaxis-Home Warfarin. Diet: ADULT DIET; Regular; 3 carb choices (45 gm/meal); No Concentrated sweets  Code Status: Full Code    Disposition-Awaiting blood c/s-monitoring hemarthrosis/knee swelling as patient on warfarin.   Jeremy Chavarria MD

## 2021-10-11 LAB
ANION GAP SERPL CALCULATED.3IONS-SCNC: 5 MMOL/L (ref 7–16)
BASOPHILS ABSOLUTE: 0.13 E9/L (ref 0–0.2)
BASOPHILS RELATIVE PERCENT: 0.9 % (ref 0–2)
BUN BLDV-MCNC: 14 MG/DL (ref 6–20)
CALCIUM SERPL-MCNC: 8.7 MG/DL (ref 8.6–10.2)
CHLORIDE BLD-SCNC: 100 MMOL/L (ref 98–107)
CO2: 27 MMOL/L (ref 22–29)
CREAT SERPL-MCNC: 0.9 MG/DL (ref 0.7–1.2)
CRYSTALS, FLUID: NORMAL
EOSINOPHILS ABSOLUTE: 0.62 E9/L (ref 0.05–0.5)
EOSINOPHILS RELATIVE PERCENT: 4.4 % (ref 0–6)
GFR AFRICAN AMERICAN: >60
GFR NON-AFRICAN AMERICAN: >60 ML/MIN/1.73
GLUCOSE BLD-MCNC: 95 MG/DL (ref 74–99)
HCT VFR BLD CALC: 27.8 % (ref 37–54)
HEMOGLOBIN: 9 G/DL (ref 12.5–16.5)
IMMATURE GRANULOCYTES #: 0.05 E9/L
IMMATURE GRANULOCYTES %: 0.4 % (ref 0–5)
LACTIC ACID: 1.4 MMOL/L (ref 0.5–2.2)
LYMPHOCYTES ABSOLUTE: 2.9 E9/L (ref 1.5–4)
LYMPHOCYTES RELATIVE PERCENT: 20.5 % (ref 20–42)
MCH RBC QN AUTO: 29.7 PG (ref 26–35)
MCHC RBC AUTO-ENTMCNC: 32.4 % (ref 32–34.5)
MCV RBC AUTO: 91.7 FL (ref 80–99.9)
METER GLUCOSE: 104 MG/DL (ref 74–99)
METER GLUCOSE: 115 MG/DL (ref 74–99)
METER GLUCOSE: 145 MG/DL (ref 74–99)
METER GLUCOSE: 88 MG/DL (ref 74–99)
MONOCYTES ABSOLUTE: 1.52 E9/L (ref 0.1–0.95)
MONOCYTES RELATIVE PERCENT: 10.7 % (ref 2–12)
NEUTROPHILS ABSOLUTE: 8.92 E9/L (ref 1.8–7.3)
NEUTROPHILS RELATIVE PERCENT: 63.1 % (ref 43–80)
OVALOCYTES: ABNORMAL
PDW BLD-RTO: 15.2 FL (ref 11.5–15)
PLATELET # BLD: 635 E9/L (ref 130–450)
PMV BLD AUTO: 8.8 FL (ref 7–12)
POIKILOCYTES: ABNORMAL
POLYCHROMASIA: ABNORMAL
POTASSIUM REFLEX MAGNESIUM: 3.6 MMOL/L (ref 3.5–5)
RBC # BLD: 3.03 E12/L (ref 3.8–5.8)
SCHISTOCYTES: ABNORMAL
SODIUM BLD-SCNC: 132 MMOL/L (ref 132–146)
SOURCE BODY FLUID: NORMAL
WBC # BLD: 14.1 E9/L (ref 4.5–11.5)

## 2021-10-11 PROCEDURE — 85025 COMPLETE CBC W/AUTO DIFF WBC: CPT

## 2021-10-11 PROCEDURE — 6370000000 HC RX 637 (ALT 250 FOR IP): Performed by: STUDENT IN AN ORGANIZED HEALTH CARE EDUCATION/TRAINING PROGRAM

## 2021-10-11 PROCEDURE — 6360000002 HC RX W HCPCS: Performed by: STUDENT IN AN ORGANIZED HEALTH CARE EDUCATION/TRAINING PROGRAM

## 2021-10-11 PROCEDURE — 87040 BLOOD CULTURE FOR BACTERIA: CPT

## 2021-10-11 PROCEDURE — 36415 COLL VENOUS BLD VENIPUNCTURE: CPT

## 2021-10-11 PROCEDURE — 1200000000 HC SEMI PRIVATE

## 2021-10-11 PROCEDURE — 2580000003 HC RX 258: Performed by: STUDENT IN AN ORGANIZED HEALTH CARE EDUCATION/TRAINING PROGRAM

## 2021-10-11 PROCEDURE — 6370000000 HC RX 637 (ALT 250 FOR IP): Performed by: FAMILY MEDICINE

## 2021-10-11 PROCEDURE — 82962 GLUCOSE BLOOD TEST: CPT

## 2021-10-11 PROCEDURE — 80048 BASIC METABOLIC PNL TOTAL CA: CPT

## 2021-10-11 PROCEDURE — 83605 ASSAY OF LACTIC ACID: CPT

## 2021-10-11 RX ADMIN — LOPERAMIDE HYDROCHLORIDE 2 MG: 2 CAPSULE ORAL at 16:49

## 2021-10-11 RX ADMIN — DAPTOMYCIN 600 MG: 500 INJECTION, POWDER, LYOPHILIZED, FOR SOLUTION INTRAVENOUS at 21:00

## 2021-10-11 RX ADMIN — HYDROCODONE BITARTRATE AND ACETAMINOPHEN 1 TABLET: 5; 325 TABLET ORAL at 12:37

## 2021-10-11 RX ADMIN — HYDROCODONE BITARTRATE AND ACETAMINOPHEN 1 TABLET: 5; 325 TABLET ORAL at 00:15

## 2021-10-11 RX ADMIN — WARFARIN SODIUM 7.5 MG: 7.5 TABLET ORAL at 18:07

## 2021-10-11 RX ADMIN — HYDROCODONE BITARTRATE AND ACETAMINOPHEN 1 TABLET: 5; 325 TABLET ORAL at 04:16

## 2021-10-11 RX ADMIN — LOPERAMIDE HYDROCHLORIDE 2 MG: 2 CAPSULE ORAL at 08:38

## 2021-10-11 RX ADMIN — METOPROLOL SUCCINATE 25 MG: 25 TABLET, EXTENDED RELEASE ORAL at 08:34

## 2021-10-11 RX ADMIN — LISINOPRIL 5 MG: 10 TABLET ORAL at 08:34

## 2021-10-11 RX ADMIN — HYDROCODONE BITARTRATE AND ACETAMINOPHEN 1 TABLET: 5; 325 TABLET ORAL at 21:00

## 2021-10-11 RX ADMIN — HYDROCODONE BITARTRATE AND ACETAMINOPHEN 1 TABLET: 5; 325 TABLET ORAL at 16:48

## 2021-10-11 RX ADMIN — HYDROXYUREA 500 MG: 500 CAPSULE ORAL at 21:00

## 2021-10-11 RX ADMIN — HYDROCODONE BITARTRATE AND ACETAMINOPHEN 1 TABLET: 5; 325 TABLET ORAL at 08:34

## 2021-10-11 RX ADMIN — INSULIN GLARGINE 25 UNITS: 100 INJECTION, SOLUTION SUBCUTANEOUS at 21:08

## 2021-10-11 RX ADMIN — HYDROXYUREA 500 MG: 500 CAPSULE ORAL at 08:34

## 2021-10-11 ASSESSMENT — PAIN DESCRIPTION - DESCRIPTORS: DESCRIPTORS: CONSTANT;DISCOMFORT

## 2021-10-11 ASSESSMENT — PAIN SCALES - GENERAL
PAINLEVEL_OUTOF10: 10
PAINLEVEL_OUTOF10: 9
PAINLEVEL_OUTOF10: 8
PAINLEVEL_OUTOF10: 10

## 2021-10-11 ASSESSMENT — PAIN DESCRIPTION - ONSET: ONSET: ON-GOING

## 2021-10-11 ASSESSMENT — PAIN DESCRIPTION - FREQUENCY: FREQUENCY: CONTINUOUS

## 2021-10-11 ASSESSMENT — PAIN DESCRIPTION - ORIENTATION: ORIENTATION: RIGHT

## 2021-10-11 ASSESSMENT — PAIN DESCRIPTION - LOCATION: LOCATION: KNEE

## 2021-10-11 ASSESSMENT — PAIN DESCRIPTION - PROGRESSION
CLINICAL_PROGRESSION: NOT CHANGED
CLINICAL_PROGRESSION: NOT CHANGED

## 2021-10-11 ASSESSMENT — PAIN DESCRIPTION - PAIN TYPE: TYPE: ACUTE PAIN

## 2021-10-11 NOTE — PROGRESS NOTES
Hospitalist Progress Note      PCP: IGOR Hall CNP    Date of Admission: 10/8/2021    Chief Complaint: knee pain    Synopsis:   50 y.o. male who presented to [facility] with complaints of a dislodged PICC line. Patient was recently admitted for cellulitis and was discharged to home on daptomycin for 6 weeks. He was receiving it through home health nursing giving the antibiotics daily. Today while giving the antibiotics PICC line was dislodged and hence he was brought into the ER. Patient also endorses that he has been having some pain in his right knee after a fall about 1 week back. He had swelling in the right knee which was increasing and hence he had imaging done on 10/5/2020 of the right knee which did not show any acute findings. Since then patient states that his pain has worsened but the swelling has improved. Patient does not complain of any warmth or fever/chills associated. He does endorse that the cellulitis in left lower extremity is better now with respect to the redness/pain/swelling. Patient currently has stopped taking his Coumadin since the fall 1 week back-his INR was 6.0 at that time. Orthopedics has ordered imaging which showed hemarthrosis, synovial fluid was ordered and cultures did not grow anything. He also had SIMÓN which was resolved with fluids. Patient hadlow grade fevers(<100.4), lactic acid at 2.3, Procalc @ 0.18, WBC elevated at 14.1. Cloteal Abrams Blood cultures have been ordered- pending. on daptomycin until 10/14/2021. Subjective: Patient was seen at bedside and explained about the plan regarding awaiting blood c/s as his infectious markers are elevated. Patient states that his knee pain has been stable along with the swelling. Patient had no acute questions or concerns at this time.       Medications:  Reviewed    Infusion Medications    sodium chloride 100 mL/hr at 10/09/21 2018    sodium chloride      dextrose       Scheduled Medications    warfarin  7.5 mg Oral Daily    sodium chloride flush  5-40 mL IntraVENous 2 times per day    hydroxyurea  500 mg Oral BID    insulin glargine  25 Units SubCUTAneous Nightly    lisinopril  5 mg Oral Daily    metoprolol succinate  25 mg Oral Daily    daptomycin (CUBICIN) IVPB  600 mg IntraVENous Q24H     PRN Meds: sodium chloride flush, sodium chloride, ondansetron **OR** ondansetron, polyethylene glycol, acetaminophen **OR** acetaminophen, cyclobenzaprine, loperamide, melatonin, glucose, dextrose, glucagon (rDNA), dextrose, HYDROcodone 5 mg - acetaminophen      Intake/Output Summary (Last 24 hours) at 10/11/2021 1005  Last data filed at 10/10/2021 2233  Gross per 24 hour   Intake 400 ml   Output --   Net 400 ml       Exam:    BP (!) 128/90   Pulse 93   Temp 98.4 °F (36.9 °C) (Temporal)   Resp 16   Ht 6' 1\" (1.854 m)   Wt 297 lb 11.2 oz (135 kg)   SpO2 99%   BMI 39.28 kg/m²     General appearance: No apparent distress, appears stated age and cooperative. HEENT: Pupils equal, round, and reactive to light. Conjunctivae/corneas clear. Neck: Supple, with full range of motion. No jugular venous distention. Trachea midline. Respiratory:  Normal respiratory effort. Clear to auscultation, bilaterally without Rales/Wheezes/Rhonchi. Cardiovascular: Regular rate and rhythm with normal S1/S2 without murmurs, rubs or gallops. Abdomen: Soft, non-tender, non-distended with normal bowel sounds. Musculoskeletal: Right knee-Compressive wrap in place. Patient with decreased flexion actively. No pain out of proportion. No increase in swelling noticed. Skin: Skin color, texture, turgor normal.  No rashes or lesions. Neurologic:  Neurovascularly intact without any focal sensory/motor deficits.  Cranial nerves: II-XII intact, grossly non-focal.  Psychiatric: Alert and oriented, thought content appropriate, normal insight    Labs:   Recent Labs     10/09/21  1137 10/10/21  0002 10/11/21  0415   WBC 11.4 12.5* 14.1*   HGB 9.1* 8.8* recommendations-no intervention necessary at this time  -Will be placed on sliding scale insulin along with continuing basal Lantus 25 units  -Biftgaaihuqa-oglsacmhb-qe will continue fluids at this time, urine sodium, urine osm ordered  -X-ray of the femur-right reviewed from 10/5/2021 shows joint effusion of the right knee-shows no fractures in the tibia/fibula.  -We will continue home medications for depression, hyperlipidemia, hypertension     DVT Prophylaxis-Home Warfarin. Diet: ADULT DIET; Regular; 3 carb choices (45 gm/meal); No Concentrated sweets  Code Status: Full Code    Disposition-Awaiting blood c/s-monitoring hemarthrosis/knee swelling as patient on warfarin.   Noa Patricia MD

## 2021-10-11 NOTE — CARE COORDINATION
Patient with Recent history of left lower extremity cellulitis-Staph warneri bacteremia-on daptomycin is admitted with Acute kidney injury (SIMÓN) with acute tubular necrosis. Ortho surgery was also consulted for Right lower extremity pain, swelling. Patient is currently active with [facility] skilled nursing per [place] from [address]. Will need new home health care orders prior to discharge.  Twin Schmitz RN CM

## 2021-10-11 NOTE — PLAN OF CARE
Problem: Pain:  Goal: Pain level will decrease  Description: Pain level will decrease  10/11/2021 1237 by Tiffany Jaime RN  Outcome: Met This Shift  10/10/2021 2308 by Lino Rinne, RN  Outcome: Met This Shift  Goal: Control of acute pain  Description: Control of acute pain  10/11/2021 1237 by Tiffany Jaime RN  Outcome: Met This Shift  10/10/2021 2308 by Lino Rinne, RN  Outcome: Met This Shift  Goal: Control of chronic pain  Description: Control of chronic pain  Outcome: Met This Shift     Problem: Falls - Risk of:  Goal: Will remain free from falls  Description: Will remain free from falls  10/11/2021 1237 by Tiffany Jaime RN  Outcome: Met This Shift  10/10/2021 2308 by Lino Rinne, RN  Outcome: Met This Shift  Goal: Absence of physical injury  Description: Absence of physical injury  10/11/2021 1237 by Tiffany Jaime RN  Outcome: Met This Shift  10/10/2021 2308 by Lino Rinne, RN  Outcome: Met This Shift

## 2021-10-12 LAB
ANION GAP SERPL CALCULATED.3IONS-SCNC: 9 MMOL/L (ref 7–16)
BASOPHILS ABSOLUTE: 0.11 E9/L (ref 0–0.2)
BASOPHILS RELATIVE PERCENT: 0.7 % (ref 0–2)
BUN BLDV-MCNC: 9 MG/DL (ref 6–20)
C-REACTIVE PROTEIN: 4.9 MG/DL (ref 0–0.4)
CALCIUM SERPL-MCNC: 8.9 MG/DL (ref 8.6–10.2)
CHLORIDE BLD-SCNC: 104 MMOL/L (ref 98–107)
CO2: 23 MMOL/L (ref 22–29)
CREAT SERPL-MCNC: 0.8 MG/DL (ref 0.7–1.2)
EOSINOPHILS ABSOLUTE: 0.82 E9/L (ref 0.05–0.5)
EOSINOPHILS RELATIVE PERCENT: 5.4 % (ref 0–6)
GFR AFRICAN AMERICAN: >60
GFR NON-AFRICAN AMERICAN: >60 ML/MIN/1.73
GLUCOSE BLD-MCNC: 96 MG/DL (ref 74–99)
HCT VFR BLD CALC: 27.5 % (ref 37–54)
HEMOGLOBIN: 9 G/DL (ref 12.5–16.5)
IMMATURE GRANULOCYTES #: 0.07 E9/L
IMMATURE GRANULOCYTES %: 0.5 % (ref 0–5)
INR BLD: 2.5
LYMPHOCYTES ABSOLUTE: 2.83 E9/L (ref 1.5–4)
LYMPHOCYTES RELATIVE PERCENT: 18.6 % (ref 20–42)
MAGNESIUM: 1.7 MG/DL (ref 1.6–2.6)
MCH RBC QN AUTO: 30.2 PG (ref 26–35)
MCHC RBC AUTO-ENTMCNC: 32.7 % (ref 32–34.5)
MCV RBC AUTO: 92.3 FL (ref 80–99.9)
METER GLUCOSE: 111 MG/DL (ref 74–99)
METER GLUCOSE: 123 MG/DL (ref 74–99)
METER GLUCOSE: 96 MG/DL (ref 74–99)
MONOCYTES ABSOLUTE: 1.39 E9/L (ref 0.1–0.95)
MONOCYTES RELATIVE PERCENT: 9.1 % (ref 2–12)
NEUTROPHILS ABSOLUTE: 10.01 E9/L (ref 1.8–7.3)
NEUTROPHILS RELATIVE PERCENT: 65.7 % (ref 43–80)
PDW BLD-RTO: 15.1 FL (ref 11.5–15)
PLATELET # BLD: 629 E9/L (ref 130–450)
PMV BLD AUTO: 8.6 FL (ref 7–12)
POTASSIUM REFLEX MAGNESIUM: 3.5 MMOL/L (ref 3.5–5)
PROCALCITONIN: 0.09 NG/ML (ref 0–0.08)
PROTHROMBIN TIME: 27.4 SEC (ref 9.3–12.4)
RBC # BLD: 2.98 E12/L (ref 3.8–5.8)
SEDIMENTATION RATE, ERYTHROCYTE: 77 MM/HR (ref 0–15)
SODIUM BLD-SCNC: 136 MMOL/L (ref 132–146)
WBC # BLD: 15.2 E9/L (ref 4.5–11.5)

## 2021-10-12 PROCEDURE — 86140 C-REACTIVE PROTEIN: CPT

## 2021-10-12 PROCEDURE — 83735 ASSAY OF MAGNESIUM: CPT

## 2021-10-12 PROCEDURE — 6370000000 HC RX 637 (ALT 250 FOR IP): Performed by: FAMILY MEDICINE

## 2021-10-12 PROCEDURE — 36415 COLL VENOUS BLD VENIPUNCTURE: CPT

## 2021-10-12 PROCEDURE — 6370000000 HC RX 637 (ALT 250 FOR IP): Performed by: STUDENT IN AN ORGANIZED HEALTH CARE EDUCATION/TRAINING PROGRAM

## 2021-10-12 PROCEDURE — 85610 PROTHROMBIN TIME: CPT

## 2021-10-12 PROCEDURE — 85025 COMPLETE CBC W/AUTO DIFF WBC: CPT

## 2021-10-12 PROCEDURE — 85651 RBC SED RATE NONAUTOMATED: CPT

## 2021-10-12 PROCEDURE — 80048 BASIC METABOLIC PNL TOTAL CA: CPT

## 2021-10-12 PROCEDURE — 2580000003 HC RX 258: Performed by: STUDENT IN AN ORGANIZED HEALTH CARE EDUCATION/TRAINING PROGRAM

## 2021-10-12 PROCEDURE — 1200000000 HC SEMI PRIVATE

## 2021-10-12 PROCEDURE — 84145 PROCALCITONIN (PCT): CPT

## 2021-10-12 PROCEDURE — 82962 GLUCOSE BLOOD TEST: CPT

## 2021-10-12 PROCEDURE — 6360000002 HC RX W HCPCS: Performed by: STUDENT IN AN ORGANIZED HEALTH CARE EDUCATION/TRAINING PROGRAM

## 2021-10-12 RX ORDER — OXYCODONE HYDROCHLORIDE 5 MG/1
2.5 TABLET ORAL EVERY 4 HOURS PRN
Status: DISCONTINUED | OUTPATIENT
Start: 2021-10-12 | End: 2021-10-13 | Stop reason: HOSPADM

## 2021-10-12 RX ORDER — OXYCODONE HYDROCHLORIDE AND ACETAMINOPHEN 5; 325 MG/1; MG/1
1 TABLET ORAL EVERY 4 HOURS PRN
Status: DISCONTINUED | OUTPATIENT
Start: 2021-10-12 | End: 2021-10-13 | Stop reason: HOSPADM

## 2021-10-12 RX ORDER — OXYCODONE AND ACETAMINOPHEN 7.5; 325 MG/1; MG/1
1 TABLET ORAL EVERY 4 HOURS PRN
Status: DISCONTINUED | OUTPATIENT
Start: 2021-10-12 | End: 2021-10-12 | Stop reason: CLARIF

## 2021-10-12 RX ORDER — OXYCODONE HYDROCHLORIDE AND ACETAMINOPHEN 5; 325 MG/1; MG/1
1 TABLET ORAL EVERY 4 HOURS PRN
Status: DISCONTINUED | OUTPATIENT
Start: 2021-10-12 | End: 2021-10-12 | Stop reason: CLARIF

## 2021-10-12 RX ADMIN — HYDROXYUREA 500 MG: 500 CAPSULE ORAL at 22:31

## 2021-10-12 RX ADMIN — SODIUM CHLORIDE: 9 INJECTION, SOLUTION INTRAVENOUS at 10:04

## 2021-10-12 RX ADMIN — CYCLOBENZAPRINE 10 MG: 10 TABLET, FILM COATED ORAL at 18:08

## 2021-10-12 RX ADMIN — HYDROCODONE BITARTRATE AND ACETAMINOPHEN 1 TABLET: 5; 325 TABLET ORAL at 01:10

## 2021-10-12 RX ADMIN — WARFARIN SODIUM 7.5 MG: 7.5 TABLET ORAL at 17:03

## 2021-10-12 RX ADMIN — METOPROLOL SUCCINATE 25 MG: 25 TABLET, EXTENDED RELEASE ORAL at 09:43

## 2021-10-12 RX ADMIN — INSULIN GLARGINE 25 UNITS: 100 INJECTION, SOLUTION SUBCUTANEOUS at 22:31

## 2021-10-12 RX ADMIN — OXYCODONE 2.5 MG: 5 TABLET ORAL at 22:31

## 2021-10-12 RX ADMIN — HYDROCODONE BITARTRATE AND ACETAMINOPHEN 1 TABLET: 5; 325 TABLET ORAL at 09:46

## 2021-10-12 RX ADMIN — LISINOPRIL 5 MG: 10 TABLET ORAL at 09:43

## 2021-10-12 RX ADMIN — DAPTOMYCIN 600 MG: 500 INJECTION, POWDER, LYOPHILIZED, FOR SOLUTION INTRAVENOUS at 22:31

## 2021-10-12 RX ADMIN — HYDROCODONE BITARTRATE AND ACETAMINOPHEN 1 TABLET: 5; 325 TABLET ORAL at 05:43

## 2021-10-12 RX ADMIN — LOPERAMIDE HYDROCHLORIDE 2 MG: 2 CAPSULE ORAL at 09:43

## 2021-10-12 RX ADMIN — OXYCODONE HYDROCHLORIDE AND ACETAMINOPHEN 1 TABLET: 5; 325 TABLET ORAL at 18:08

## 2021-10-12 RX ADMIN — HYDROXYUREA 500 MG: 500 CAPSULE ORAL at 10:03

## 2021-10-12 RX ADMIN — HYDROCODONE BITARTRATE AND ACETAMINOPHEN 1 TABLET: 5; 325 TABLET ORAL at 13:57

## 2021-10-12 ASSESSMENT — PAIN SCALES - GENERAL
PAINLEVEL_OUTOF10: 10
PAINLEVEL_OUTOF10: 6
PAINLEVEL_OUTOF10: 10
PAINLEVEL_OUTOF10: 9
PAINLEVEL_OUTOF10: 9
PAINLEVEL_OUTOF10: 10
PAINLEVEL_OUTOF10: 8

## 2021-10-12 ASSESSMENT — PAIN DESCRIPTION - FREQUENCY: FREQUENCY: CONTINUOUS

## 2021-10-12 ASSESSMENT — PAIN DESCRIPTION - PROGRESSION
CLINICAL_PROGRESSION: NOT CHANGED
CLINICAL_PROGRESSION: NOT CHANGED

## 2021-10-12 ASSESSMENT — PAIN DESCRIPTION - DESCRIPTORS: DESCRIPTORS: ACHING;CONSTANT;DISCOMFORT

## 2021-10-12 ASSESSMENT — PAIN DESCRIPTION - ONSET: ONSET: ON-GOING

## 2021-10-12 ASSESSMENT — PAIN DESCRIPTION - ORIENTATION: ORIENTATION: RIGHT;LOWER

## 2021-10-12 ASSESSMENT — PAIN - FUNCTIONAL ASSESSMENT: PAIN_FUNCTIONAL_ASSESSMENT: PREVENTS OR INTERFERES SOME ACTIVE ACTIVITIES AND ADLS

## 2021-10-12 ASSESSMENT — PAIN DESCRIPTION - PAIN TYPE: TYPE: ACUTE PAIN

## 2021-10-12 ASSESSMENT — PAIN DESCRIPTION - LOCATION: LOCATION: BACK;KNEE;GROIN

## 2021-10-12 NOTE — CARE COORDINATION
Per internal med note today, Continue with daptomycin-last dose 10/14 per ID. Sherryle Moder Sherryle Moder PT OT evaluations will be sought due to patient's knee complaints. Sherryle Moder Sherryle Moder Considered ID consultation but will hold for now. I met with patient in room to explain role and discuss transition of care. Patient said he is okay with returning home with [facility] if needed. Will need CATHI orders if going home with [facility]. Patient said his son will transport him home at time of discharge.    Annalise Kraft RN CM

## 2021-10-12 NOTE — HOME CARE
Patient current with [facility] for SN. Will need CATHI orders if appropriate at discharge. Elanie Reid LPN  [facility].

## 2021-10-12 NOTE — PROGRESS NOTES
Hospitalist Progress Note      SYNOPSIS: 50 y. o. male who presented to [facility] with complaints of a dislodged PICC line. Patient was recently admitted for cellulitis and was discharged to home on daptomycin for 6 weeks. He was receiving it through home health nursing giving the antibiotics daily. Today while giving the antibiotics PICC line was dislodged and hence he was brought into the ER. Patient also endorses that he has been having some pain in his right knee after a fall about 1 week back. He had swelling in the right knee which was increasing and hence he had imaging done on 10/5/2020 of the right knee which did not show any acute findings. Since then patient states that his pain has worsened but the swelling has improved. Patient does not complain of any warmth or fever/chills associated. He does endorse that the cellulitis in left lower extremity is better now with respect to the redness/pain/swelling. Patient currently has stopped taking his Coumadin since the fall 1 week back-his INR was 6.0 at that time.     Orthopedics has ordered imaging which showed hemarthrosis, synovial fluid was ordered and cultures did not grow anything. He also had SIMÓN which was resolved with fluids. Patient hadlow grade fevers(<100.4), lactic acid at 2.3, Procalc @ 0.18, WBC elevated at 14.1. Avon Copping Blood cultures have been ordered- pending. on daptomycin until 10/14/2021      SUBJECTIVE:    Patient seen and examined  Records reviewed. He does complain of left knee pain still  Able to bear some weight  Afebrile    Stable overnight. No other overnight issues reported. Temp (24hrs), Av.9 °F (37.2 °C), Min:98.8 °F (37.1 °C), Max:99 °F (37.2 °C)    DIET: ADULT DIET; Regular; 3 carb choices (45 gm/meal);  No Concentrated sweets  CODE: Full Code    Intake/Output Summary (Last 24 hours) at 10/12/2021 0824  Last data filed at 10/12/2021 0521  Gross per 24 hour   Intake 1280 ml   Output --   Net 1280 ml daptomycin (CUBICIN) IVPB  600 mg IntraVENous Q24H     PRN Meds: sodium chloride flush, sodium chloride, ondansetron **OR** ondansetron, polyethylene glycol, acetaminophen **OR** acetaminophen, cyclobenzaprine, loperamide, melatonin, glucose, dextrose, glucagon (rDNA), dextrose, HYDROcodone 5 mg - acetaminophen    Labs:     Recent Labs     10/10/21  0002 10/11/21  0415 10/12/21  0408   WBC 12.5* 14.1* 15.2*   HGB 8.8* 9.0* 9.0*   HCT 27.6* 27.8* 27.5*   * 635* 629*       Recent Labs     10/10/21  0002 10/11/21  0415 10/12/21  0408    132 136   K 3.9 3.6 3.5    100 104   CO2 27 27 23   BUN 20 14 9   CREATININE 1.0 0.9 0.8   CALCIUM 9.1 8.7 8.9       No results for input(s): PROT, ALB, ALKPHOS, ALT, AST, BILITOT, AMYLASE, LIPASE in the last 72 hours. No results for input(s): INR in the last 72 hours. No results for input(s): Abimael [place] in the last 72 hours. Chronic labs:    Lab Results   Component Value Date    CHOL 149 09/20/2021    TRIG 155 (H) 09/20/2021    HDL 48 09/20/2021    LDLCALC 70 09/20/2021    TSH 0.965 09/20/2021    INR 2.5 10/08/2021    LABA1C 6.2 09/20/2021       Radiology: REVIEWED DAILY    +++++++++++++++++++++++++++++++++++++++++++++++++  Elodia Aguirre MD  [facility] - [address] [place] [address]  +++++++++++++++++++++++++++++++++++++++++++++++++  NOTE: This report was transcribed using voice recognition software. Every effort was made to ensure accuracy; however, inadvertent computerized transcription errors may be present.

## 2021-10-12 NOTE — PLAN OF CARE
Patient called regarding bone density as noted below. Asking for return call from Dr. Hilton. Please advise. Thank you.   Problem: Pain:  Goal: Pain level will decrease  Description: Pain level will decrease  10/12/2021 0001 by Wiley Frost RN  Outcome: Met This Shift  10/11/2021 1237 by Tiffany Noel RN  Outcome: Met This Shift     Problem: Pain:  Goal: Control of acute pain  Description: Control of acute pain  10/12/2021 0001 by Wiley Frost RN  Outcome: Met This Shift  10/11/2021 1237 by Tiffany Noel RN  Outcome: Met This Shift     Problem: Falls - Risk of:  Goal: Will remain free from falls  Description: Will remain free from falls  10/12/2021 0001 by Wiley Frost RN  Outcome: Met This Shift  10/11/2021 1237 by Tiffany Noel RN  Outcome: Met This Shift

## 2021-10-13 VITALS
RESPIRATION RATE: 18 BRPM | WEIGHT: 297.7 LBS | OXYGEN SATURATION: 100 % | TEMPERATURE: 98.7 F | DIASTOLIC BLOOD PRESSURE: 76 MMHG | SYSTOLIC BLOOD PRESSURE: 157 MMHG | BODY MASS INDEX: 39.45 KG/M2 | HEART RATE: 97 BPM | HEIGHT: 73 IN

## 2021-10-13 LAB
ANION GAP SERPL CALCULATED.3IONS-SCNC: 15 MMOL/L (ref 7–16)
BASOPHILS ABSOLUTE: 0.09 E9/L (ref 0–0.2)
BASOPHILS RELATIVE PERCENT: 0.5 % (ref 0–2)
BODY FLUID CULTURE, STERILE: NORMAL
BUN BLDV-MCNC: 9 MG/DL (ref 6–20)
CALCIUM SERPL-MCNC: 9 MG/DL (ref 8.6–10.2)
CHLORIDE BLD-SCNC: 105 MMOL/L (ref 98–107)
CO2: 21 MMOL/L (ref 22–29)
CREAT SERPL-MCNC: 0.8 MG/DL (ref 0.7–1.2)
EOSINOPHILS ABSOLUTE: 0.31 E9/L (ref 0.05–0.5)
EOSINOPHILS RELATIVE PERCENT: 1.8 % (ref 0–6)
GFR AFRICAN AMERICAN: >60
GFR NON-AFRICAN AMERICAN: >60 ML/MIN/1.73
GLUCOSE BLD-MCNC: 99 MG/DL (ref 74–99)
GRAM STAIN RESULT: NORMAL
HCT VFR BLD CALC: 28.7 % (ref 37–54)
HEMOGLOBIN: 9.4 G/DL (ref 12.5–16.5)
IMMATURE GRANULOCYTES #: 0.07 E9/L
IMMATURE GRANULOCYTES %: 0.4 % (ref 0–5)
INR BLD: 2.9
LYMPHOCYTES ABSOLUTE: 2.23 E9/L (ref 1.5–4)
LYMPHOCYTES RELATIVE PERCENT: 13.2 % (ref 20–42)
MCH RBC QN AUTO: 30.1 PG (ref 26–35)
MCHC RBC AUTO-ENTMCNC: 32.8 % (ref 32–34.5)
MCV RBC AUTO: 92 FL (ref 80–99.9)
METER GLUCOSE: 95 MG/DL (ref 74–99)
METER GLUCOSE: 97 MG/DL (ref 74–99)
MONOCYTES ABSOLUTE: 1.21 E9/L (ref 0.1–0.95)
MONOCYTES RELATIVE PERCENT: 7.2 % (ref 2–12)
NEUTROPHILS ABSOLUTE: 12.98 E9/L (ref 1.8–7.3)
NEUTROPHILS RELATIVE PERCENT: 76.9 % (ref 43–80)
PDW BLD-RTO: 15.1 FL (ref 11.5–15)
PLATELET # BLD: 684 E9/L (ref 130–450)
PMV BLD AUTO: 8.8 FL (ref 7–12)
POTASSIUM REFLEX MAGNESIUM: 3.8 MMOL/L (ref 3.5–5)
PROTHROMBIN TIME: 32.7 SEC (ref 9.3–12.4)
RBC # BLD: 3.12 E12/L (ref 3.8–5.8)
SODIUM BLD-SCNC: 141 MMOL/L (ref 132–146)
WBC # BLD: 16.9 E9/L (ref 4.5–11.5)

## 2021-10-13 PROCEDURE — 80048 BASIC METABOLIC PNL TOTAL CA: CPT

## 2021-10-13 PROCEDURE — 97535 SELF CARE MNGMENT TRAINING: CPT

## 2021-10-13 PROCEDURE — 82962 GLUCOSE BLOOD TEST: CPT

## 2021-10-13 PROCEDURE — 85610 PROTHROMBIN TIME: CPT

## 2021-10-13 PROCEDURE — 6370000000 HC RX 637 (ALT 250 FOR IP): Performed by: STUDENT IN AN ORGANIZED HEALTH CARE EDUCATION/TRAINING PROGRAM

## 2021-10-13 PROCEDURE — 6370000000 HC RX 637 (ALT 250 FOR IP): Performed by: FAMILY MEDICINE

## 2021-10-13 PROCEDURE — 36415 COLL VENOUS BLD VENIPUNCTURE: CPT

## 2021-10-13 PROCEDURE — 85025 COMPLETE CBC W/AUTO DIFF WBC: CPT

## 2021-10-13 PROCEDURE — 97165 OT EVAL LOW COMPLEX 30 MIN: CPT

## 2021-10-13 RX ORDER — OXYCODONE HYDROCHLORIDE AND ACETAMINOPHEN 5; 325 MG/1; MG/1
1 TABLET ORAL EVERY 4 HOURS PRN
Qty: 10 TABLET | Refills: 0 | Status: SHIPPED | OUTPATIENT
Start: 2021-10-13 | End: 2021-10-16

## 2021-10-13 RX ADMIN — WARFARIN SODIUM 7.5 MG: 7.5 TABLET ORAL at 18:53

## 2021-10-13 RX ADMIN — OXYCODONE HYDROCHLORIDE AND ACETAMINOPHEN 1 TABLET: 5; 325 TABLET ORAL at 06:54

## 2021-10-13 RX ADMIN — METOPROLOL SUCCINATE 25 MG: 25 TABLET, EXTENDED RELEASE ORAL at 08:15

## 2021-10-13 RX ADMIN — OXYCODONE HYDROCHLORIDE AND ACETAMINOPHEN 1 TABLET: 5; 325 TABLET ORAL at 15:06

## 2021-10-13 RX ADMIN — OXYCODONE HYDROCHLORIDE AND ACETAMINOPHEN 1 TABLET: 5; 325 TABLET ORAL at 11:05

## 2021-10-13 RX ADMIN — OXYCODONE HYDROCHLORIDE AND ACETAMINOPHEN 1 TABLET: 5; 325 TABLET ORAL at 18:53

## 2021-10-13 RX ADMIN — LISINOPRIL 5 MG: 10 TABLET ORAL at 08:05

## 2021-10-13 RX ADMIN — CYCLOBENZAPRINE 10 MG: 10 TABLET, FILM COATED ORAL at 08:11

## 2021-10-13 RX ADMIN — OXYCODONE 2.5 MG: 5 TABLET ORAL at 02:32

## 2021-10-13 RX ADMIN — HYDROXYUREA 500 MG: 500 CAPSULE ORAL at 08:07

## 2021-10-13 ASSESSMENT — PAIN SCALES - GENERAL
PAINLEVEL_OUTOF10: 4
PAINLEVEL_OUTOF10: 8
PAINLEVEL_OUTOF10: 8
PAINLEVEL_OUTOF10: 10
PAINLEVEL_OUTOF10: 8
PAINLEVEL_OUTOF10: 3
PAINLEVEL_OUTOF10: 8
PAINLEVEL_OUTOF10: 4
PAINLEVEL_OUTOF10: 10
PAINLEVEL_OUTOF10: 3

## 2021-10-13 ASSESSMENT — PAIN DESCRIPTION - LOCATION
LOCATION: KNEE
LOCATION: KNEE

## 2021-10-13 ASSESSMENT — PAIN DESCRIPTION - PAIN TYPE: TYPE: ACUTE PAIN

## 2021-10-13 ASSESSMENT — PAIN DESCRIPTION - ORIENTATION
ORIENTATION: RIGHT;LOWER
ORIENTATION: RIGHT

## 2021-10-13 ASSESSMENT — PAIN DESCRIPTION - ONSET: ONSET: ON-GOING

## 2021-10-13 ASSESSMENT — PAIN DESCRIPTION - PROGRESSION: CLINICAL_PROGRESSION: NOT CHANGED

## 2021-10-13 ASSESSMENT — PAIN DESCRIPTION - FREQUENCY: FREQUENCY: CONTINUOUS

## 2021-10-13 ASSESSMENT — PAIN DESCRIPTION - DESCRIPTORS: DESCRIPTORS: ACHING

## 2021-10-13 NOTE — PLAN OF CARE
Problem: Pain:  Goal: Pain level will decrease  Description: Pain level will decrease  10/13/2021 1633 by Randell Rice RN  Outcome: Met This Shift  10/13/2021 1459 by Randell Rice RN  Outcome: Met This Shift  Goal: Control of acute pain  Description: Control of acute pain  10/13/2021 1633 by Randell Rice RN  Outcome: Met This Shift  10/13/2021 1459 by Randell Rice RN  Outcome: Met This Shift  Goal: Control of chronic pain  Description: Control of chronic pain  10/13/2021 1633 by Randell Rice RN  Outcome: Met This Shift  10/13/2021 1459 by Randell Rice RN  Outcome: Met This Shift     Problem: Falls - Risk of:  Goal: Will remain free from falls  Description: Will remain free from falls  10/13/2021 1633 by Randell Rice RN  Outcome: Met This Shift  10/13/2021 1459 by Randell Rice RN  Outcome: Met This Shift  Goal: Absence of physical injury  Description: Absence of physical injury  10/13/2021 1633 by Randell Rice RN  Outcome: Met This Shift  10/13/2021 1459 by Randell Rice RN  Outcome: Met This Shift

## 2021-10-13 NOTE — CARE COORDINATION
Discharge order noted. Jordan Kearney from [facility] notified. Resumption of home health care orders are in. Patient's son will transport him home today. Sara Simmons RN, CM      Per therapy recommendation, PT/OT added to the home health care order. Dmitry Saleh from [facility] notified.  Sara Simmons RN CM

## 2021-10-13 NOTE — DISCHARGE SUMMARY
Hospitalist Discharge Summary    Patient ID: Janell Hector II   Patient : 1975  Patient's PCP: Theo Pisano, APRN - CNP    Admit Date: 10/8/2021   Admitting Physician: Yousif Shah MD    Discharge Date:  10/13/2021   Discharge Physician: Chris Goodman MD   Discharge Condition: Stable  Discharge Disposition: Home with [facility] course in brief:  (Please refer to daily progress notes for a comprehensive review of the hospitalization by requesting medical records)      50 y. o. male who presented to [facility] with complaints of a dislodged PICC line. Patient was recently admitted for cellulitis and was discharged to home on daptomycin for 6 weeks. He was receiving it through home health nursing giving the antibiotics daily. Today while giving the antibiotics PICC line was dislodged and hence he was brought into the ER. Patient also endorses that he has been having some pain in his right knee after a fall about 1 week back. He had swelling in the right knee which was increasing and hence he had imaging done on 10/5/2020 of the right knee which did not show any acute findings. Since then patient states that his pain has worsened but the swelling has improved. Patient does not complain of any warmth or fever/chills associated. He does endorse that the cellulitis in left lower extremity is better now with respect to the redness/pain/swelling. Patient currently has stopped taking his Coumadin since the fall 1 week back-his INR was 6.0 at that time.     Orthopedics has ordered imaging which showed hemarthrosis, synovial fluid was ordered and cultures did not grow anything.  He also had SIMÓN which was resolved with fluids.  Patient hadlow grade fevers(<100.4), lactic acid at 2.3, Procalc @ 0.18, WBC elevated at 14.1. Shira Carroll  Blood cultures have been ordered- NGTD - remains on daptomycin until 10/14/2021    Discharge exam  General appearance: No apparent distress, appears stated age and cooperative. HEENT:  Conjunctivae/corneas clear. Neck: Supple. No jugular venous distention. Respiratory: Clear to auscultation bilaterally, normal respiratory effort  Cardiovascular: Regular rate rhythm, normal S1-S2  Abdomen: Soft, nontender, nondistended  Musculoskeletal: No clubbing, cyanosis, no bilateral lower extremity edema. Brisk capillary refill.   Left knee in Kerlix dressing  Skin:  No rashes  on visible skin  Neurologic: awake, alert and following commands     Consults:   IP CONSULT TO IV TEAM  IP CONSULT TO INTERNAL MEDICINE  IP CONSULT TO ORTHOPEDIC SURGERY    Discharge Diagnoses:    SIMÓN - resolved  Recent history of left lower extremity cellulitis-Staph warneri bacteremia-on daptomycin  History of recurrent DVT  History of thrombocythemia  History of occlusive DVT of the left iliac and common femoral veins  Insulin-dependent diabetes  Hypertension  Hyperlipidemia  Chronic depression  Hyponatremia-resolved    Discharge Instructions / Follow up:    Future Appointments   Date Time Provider Elisabeth Miller   10/18/2021  1:15 PM IGOR Jama NP [facility]   12/29/2021 12:30 PM [facility]       Continued appropriate risk factor modification of blood pressure, diabetes and serum lipids will remain essential to reducing risk of future atherosclerotic development    Activity: activity as tolerated    Significant labs:  CBC:   Recent Labs     10/11/21  0415 10/12/21  0408 10/13/21  0422   WBC 14.1* 15.2* 16.9*   RBC 3.03* 2.98* 3.12*   HGB 9.0* 9.0* 9.4*   HCT 27.8* 27.5* 28.7*   MCV 91.7 92.3 92.0   RDW 15.2* 15.1* 15.1*   * 629* 684*     BMP:   Recent Labs     10/11/21  0415 10/12/21  0408 10/13/21  0422    136 141   K 3.6 3.5 3.8    104 105   CO2 27 23 21*   BUN 14 9 9   CREATININE 0.9 0.8 0.8   MG  --  1.7  --      LFT:  No results for input(s): PROT, ALB, ALKPHOS, ALT, AST, BILITOT, AMYLASE, LIPASE in the last 72 hours. PT/INR:   Recent Labs     10/12/21  1435 10/13/21  0422   INR 2.5 2.9     BNP: No results for input(s): BNP in the last 72 hours. Hgb A1C:   Lab Results   Component Value Date    LABA1C 6.2 09/20/2021     Folate and B12: No results found for: Teena Francisco, No results found for: FOLATE  Thyroid Studies:   Lab Results   Component Value Date    TSH 0.965 09/20/2021       Urinalysis:    Lab Results   Component Value Date    NITRU Negative 04/23/2021    WBCUA NONE 04/23/2021    BACTERIA RARE 04/23/2021    RBCUA 0-1 04/23/2021    BLOODU Negative 04/23/2021    SPECGRAV >=1.030 04/23/2021    GLUCOSEU Negative 04/23/2021       Imaging:  XR FEMUR RIGHT (MIN 2 VIEWS)    Result Date: 10/5/2021  EXAMINATION: XRAY VIEWS OF THE RIGHT FEMUR 10/5/2021 6:25 pm COMPARISON: None. HISTORY: ORDERING SYSTEM PROVIDED HISTORY: fall, ro fx TECHNOLOGIST PROVIDED HISTORY: Reason for exam:->fall, ro fx What reading provider will be dictating this exam?->CRC FINDINGS: There is right hip prosthesis anatomic alignment. There are no acute fractures. There is suprapatellar soft tissue swelling. No acute fractures or prosthetic complications. Knee joint effusion. XR KNEE RIGHT (3 VIEWS)    Result Date: 10/8/2021  EXAMINATION: THREE XRAY VIEWS OF THE RIGHT KNEE 10/8/2021 6:41 pm COMPARISON: None. HISTORY: ORDERING SYSTEM PROVIDED HISTORY: Pain TECHNOLOGIST PROVIDED HISTORY: Reason for exam:->Pain What reading provider will be dictating this exam?->CRC FINDINGS: There is a moderate-sized suprapatellar joint effusion. Question of faint linear lucency running obliquely through the lateral tibial plateau. This is only visualized on the lateral view. No radiopaque foreign body. Moderate-sized suprapatellar joint effusion with a possible obliquely oriented nondisplaced fracture of the lateral tibial plateau. Please correlate with history and for point tenderness.   Repeat radiographs could be considered to exclude an artifact leading to this appearance. XR TIBIA FIBULA RIGHT (2 VIEWS)    Result Date: 10/5/2021  EXAMINATION: XRAY VIEWS OF THE RIGHT TIBIA AND FIBULA 10/5/2021 6:23 pm COMPARISON: None. HISTORY: ORDERING SYSTEM PROVIDED HISTORY: fall TECHNOLOGIST PROVIDED HISTORY: Reason for exam:->fall What reading provider will be dictating this exam?->CRC FINDINGS: There is no evidence of acute fracture. There is normal alignment. No acute joint abnormality. No focal osseous lesion. There is suprapatellar soft tissue swelling. No acute osseous abnormality. Knee joint effusion. CT KNEE RIGHT WO CONTRAST    Result Date: 10/8/2021  EXAMINATION: CT OF THE RIGHT KNEE WITHOUT CONTRAST 10/8/2021 6:33 pm TECHNIQUE: CT of the right knee was performed without the administration of intravenous contrast.  Multiplanar reformatted images are provided for review. Dose modulation, iterative reconstruction, and/or weight based adjustment of the mA/kV was utilized to reduce the radiation dose to as low as reasonably achievable. COMPARISON: Right knee x-ray same date HISTORY ORDERING SYSTEM PROVIDED HISTORY: R/O fracture TECHNOLOGIST PROVIDED HISTORY: Reason for exam:->R/O fracture What reading provider will be dictating this exam?->CRC FINDINGS: Bones: No evidence of fracture or malalignment. No evidence of osseous erosion. Soft tissue: Mild generalized subcutaneous edema including prepatellar edema. Joint: There is a hyperdense joint effusion, large in size and predominantly suprapatellar in location. There is air within this joint effusion. Minimal tricompartmental degenerative changes of the knee. Large hemarthrosis which contains gas; correlate for history of joint aspiration. No evidence of fracture or malalignment. No evidence osseous erosion. Minimal tricompartmental degenerative changes.      US DUP LOWER EXTREMITY RIGHT JORGE    Result Date: 10/5/2021  EXAMINATION: DUPLEX VENOUS ULTRASOUND OF THE RIGHT LOWER EXTREMITY 10/5/2021 5:12 pm TECHNIQUE: Duplex ultrasound using B-mode/gray scaled imaging and Doppler spectral analysis and color flow was obtained of the deep venous structures of the right lower extremity. COMPARISON: None. HISTORY: ORDERING SYSTEM PROVIDED HISTORY: right leg swelling history of dvt TECHNOLOGIST PROVIDED HISTORY: Reason for exam:->right leg swelling history of dvt What reading provider will be dictating this exam?->CRC FINDINGS: The visualized veins of the right lower extremity are patent and free of echogenic thrombus. The veins demonstrate good compressibility with normal color flow study and spectral analysis. No evidence of DVT in the right lower extremity. Discharge Medications:      Medication List      START taking these medications    oxyCODONE-acetaminophen 5-325 MG per tablet  Commonly known as: PERCOCET  Take 1 tablet by mouth every 4 hours as needed for Pain for up to 3 days. CONTINUE taking these medications    cyclobenzaprine 10 MG tablet  Commonly known as: FLEXERIL     DAPTOmycin  infusion  Commonly known as: CUBICIN  Infuse 611.4 mg intravenously every 24 hours Stop on 10/14/2021     EvenCare G2 Test strip  Generic drug: blood glucose test strips  1 each by In Vitro route daily As needed.      hydroxyurea 500 MG chemo capsule  Commonly known as: HYDREA     ibuprofen 200 MG tablet  Commonly known as: ADVIL;MOTRIN     insulin glargine 100 UNIT/ML injection vial  Commonly known as: LANTUS  Inject 25 Units into the skin nightly     Insulin Syringe-Needle U-100 30G X 5/16\" 1 ML Misc  1 each by Does not apply route daily Use as directed     lisinopril 5 MG tablet  Commonly known as: PRINIVIL;ZESTRIL     loperamide 2 MG capsule  Commonly known as: IMODIUM  Take 1 capsule by mouth 4 times daily as needed for Diarrhea     melatonin 3 MG Tabs tablet     metoprolol succinate 25 MG extended release tablet  Commonly known as: TOPROL XL  Take 1 tablet by mouth daily     * warfarin 5 MG tablet  Commonly known as: COUMADIN  Take as directed. If you are unsure how to take this medication, talk to your nurse or doctor. * warfarin 2.5 MG tablet  Commonly known as: COUMADIN  Take as directed. If you are unsure how to take this medication, talk to your nurse or doctor. Original instructions: Take as directed         * This list has 2 medication(s) that are the same as other medications prescribed for you. Read the directions carefully, and ask your doctor or other care provider to review them with you. Where to Get Your Medications      You can get these medications from any pharmacy    Bring a paper prescription for each of these medications  · oxyCODONE-acetaminophen 5-325 MG per tablet         Time Spent on discharge is more than 45 minutes in the examination, evaluation, counseling and review of medications and discharge plan.    +++++++++++++++++++++++++++++++++++++++++++++++++  Miley Nelson MD  [address]  +++++++++++++++++++++++++++++++++++++++++++++++++  NOTE: This report was transcribed using voice recognition software. Every effort was made to ensure accuracy; however, inadvertent computerized transcription errors may be present.

## 2021-10-13 NOTE — PLAN OF CARE
Problem: Pain:  Goal: Pain level will decrease  Description: Pain level will decrease  10/13/2021 1951 by Luis Yanez  Outcome: Completed  10/13/2021 1633 by Annette Us RN  Outcome: Met This Shift  10/13/2021 1459 by Annette Us RN  Outcome: Met This Shift  Goal: Control of acute pain  Description: Control of acute pain  10/13/2021 1951 by Luis Yanez  Outcome: Completed  10/13/2021 1633 by Annette Us RN  Outcome: Met This Shift  10/13/2021 1459 by Annette Us RN  Outcome: Met This Shift  Goal: Control of chronic pain  Description: Control of chronic pain  10/13/2021 1951 by Luis Yanez  Outcome: Completed  10/13/2021 1633 by Annette Us RN  Outcome: Met This Shift  10/13/2021 1459 by Annette Us RN  Outcome: Met This Shift     Problem: Falls - Risk of:  Goal: Will remain free from falls  Description: Will remain free from falls  10/13/2021 1951 by Luis Yanez  Outcome: Completed  10/13/2021 1633 by Annette Us RN  Outcome: Met This Shift  10/13/2021 1459 by Annette Us RN  Outcome: Met This Shift  Goal: Absence of physical injury  Description: Absence of physical injury  10/13/2021 1951 by Luis Yanez  Outcome: Completed  10/13/2021 1633 by Annette Us RN  Outcome: Met This Shift  10/13/2021 1459 by Annette Us RN  Outcome: Met This Shift

## 2021-10-13 NOTE — PROGRESS NOTES
[address]       EEMN:                                                  Patient Name: Gill Jones  MRN: [id number]  : 1975  Room: [address] [place] [address]    Evaluating OT: Lennox Wilikeli, OTR/L 64037    Referring Provider[de-identified] Anju Schaefer MD     Specific Provider Orders/Date: OT evaluation and treatment 10/12/21    Diagnosis:  Acute kidney injury [facility]) [N17.9]  Pain and swelling of right knee [M25.561, M25.461]  Displacement of peripherally inserted central catheter (PICC) ([address] [T82.524A]  Acute kidney injury (SIMÓN) with acute tubular necrosis (ATN) ([address] [N17.0]  SIMÓN (acute kidney injury) ([address] [N17.9]      Pertinent Medical History:  has a past medical history of Accident, Acute thrombosis of inferior vena cava ([address] SIMÓN (acute kidney injury) ([address] Allergic rhinitis, Blister of left leg, Cellulitis of leg, left, Chronic back pain, Depression, Dermatophytosis, Diabetes mellitus ([address] Difficulty sleeping, Displacement of lumbar intervertebral disc without myelopathy, Fibromyalgia, Fractured rib, H/O seasonal allergies, Head injury, History of deep vein thrombosis, Hyperlipidemia, Hypertension, 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[address] Lymphedema of left leg, Obesity (BMI 35.0-39.9 without comorbidity), Osteoarthritis, Recurrent acute deep vein thrombosis (DVT) of left lower extremity ([address] S/P IVC filter, Subtherapeutic international normalized ratio (INR), and Thoracic or lumbosacral neuritis or radiculitis, unspecified. Precautions:  Fall Risk, RLE knee pain/swelling.      Assessment of current deficits   [x] Functional mobility   [x]ADLs  [x] Strength               []Cognition   [x] Functional transfers   [] IADLs         [x] Safety Awareness   [x]Endurance   [] Fine Coordination              [x] Balance      [] Vision/perception   [x]Sensation    []Gross Motor Coordination  [x] ROM  [] Delirium                   [] Motor Control     OT PLAN OF CARE   OT POC based on physician orders, patient diagnosis and results of clinical assessment    Frequency/Duration  2-5 days/wk for 2 weeks PRN   Specific OT Treatment to include:   * Instruction/training on adapted ADL techniques and AE recommendations to increase functional independence within precautions       * Functional transfer/mobility training/DME recommendations for increased independence, safety, and fall prevention  * Patient/Family education to increase follow through with safety techniques and functional independence  * Sensory re-education to improve body/limb awareness, maintain/improve skin integrity, and improve hand/UE motor function  * Therapeutic exercise to improve motor endurance, ROM, and functional strength for ADLs/functional transfers  * Therapeutic activities to facilitate/challenge dynamic balance, stand tolerance for increased safety and independence with ADLs      Recommended Adaptive Equipment: 3:1 BSC to increase height for commode at home TBD, extended tub bench, ww, reacher (OT issued reacher)     Home Living:  Pt lives with son in a 2 story with 3 step(s) to enter and 1 rail(s); bed/bath on 2nd   Bathroom setup: tub shower   Equipment owned: cane, ww    Prior Level of Function: Independent  with ADLs , Independent  with IADLs; ambulated with cane PRN as of recent      Pain Level: 8/10 R Knee and lower back  Cognition: A&O: 4/4;   Follows 2 step directions: good    Memory:  good    Sequencing:  good    Problem solving:  fair    Judgement/safety:  fair     Functional Assessment:   AM-PAC Daily Activity Raw Score: 19/24     Initial Eval Status  Date: 10/13/21 Treatment Status  Date: STG=LTG  Time frame: 10-14 days   Feeding Independent   Independent    Grooming Stand by Assist   Independent    UB Dressing Stand by Assist   Independent    LB Dressing Minimal Assist   Pt issued a reacher.    Modified Webb    Bathing Minimal Assist  Modified Webb    Toileting Stand by Assist   Modified Webb    Bed Mobility  Supine to sit: Stand by Assist   Sit to supine: Stand by Assist   Supine to sit: Modified Webb   Sit to supine: Modified Webb    Functional Transfers Sit to stand: Stand by Assist   Stand to sit: Stand by Assist   Stand pivot: Stand by Assist   Modified Webb    Functional Mobility SBA with ww  Short Functional distance  Mod indep with ww   Balance Sitting: indep     Standing: SBA  Sitting: indep      Standing: indep   Activity Tolerance fair  good   Visual/  Perceptual Glasses: no, partially blind L eye          BUE  ROM/Strength/  Fine motor Coordination Hand dominance: B    RUE: ROM limited shoulder flex, WFLdistally     Strength: grossly 3-/5      Strength:  WFL     Coordination:   WFL    LUE: ROM limited shoulder flex but better than RUE, WFLdistally     Strength: grossly 3-/5      Strength:  WFL     Coordination: WFL       Hearing: WFL   Sensation:   c/o numbness or tingling B hands /feet  Tone:  WFL   Edema: R knee noted    Comments:   RN cleared patient for OT. Upon arrival patient in bed. Pt reported concerns for getting into his truck and climbing stairs. OT provided pt with general information on transfers into his truck with pt reporting understanding. RN/ case management notified of pt's concerns and recommending PT for follow up. . Patient also presents with decreased  indep with  ADLs, functional transfers, functional mobility,  Therapist facilitated and instructed pt on adapted  techniques & compensatory strategies to improve safety and independence with basic ADLs, bed mobility,  functional transfers & mobility to allow pt to achieve highest level of independence and safely. At end of session, patient in bed with call light and phone within reach, all lines and tubes intact.     Pt would benefit from continued skilled OT to increase safety and independence with completion of ADL tasks and functional mobility for improved quality of life. Treatment: OT treatment provided this date includes:    ADL-  Instruction/training on safety and adapted techniques for completion of dressing LE and bathing. OT issued pt a reacher to improve indep with LE dressing    Mobility-  Instruction/training on safety and improved independence with bed mobility  functional transfers  and functional mobility with ww          Rehab Potential: Good  for established goals     Patient / Family Goal:  Not stated    Patient and/or family were instructed on functional diagnosis, prognosis/goals and OT plan of care. Demonstrated good understanding. Eval Complexity: Low    Time In: 2:26  Time Out: 2:54  Total Treatment Time: 15 minutes    Min Units   OT Eval Low 93896  x     OT Eval Medium 70237      OT Eval High 61548       OT Re-Eval [id number]       Therapeutic Ex 24379       Therapeutic Activities 98834      ADL/Self Care 97105  15 1   Orthotic Management 73352       Neuro Re-Ed 16589       Non-Billable Time          Evaluation Time includes thorough review of current medical information, gathering information on past medical history/social history and prior level of function, completion of standardized testing/informal observation of tasks, assessment of data and education on plan of care and goals.             [place], [place] [address]

## 2021-10-13 NOTE — PLAN OF CARE
Problem: Pain:  Goal: Pain level will decrease  Description: Pain level will decrease  10/13/2021 1459 by Dale Elias RN  Outcome: Met This Shift  10/13/2021 0206 by Amanda Hernandez RN  Outcome: Met This Shift  Goal: Control of acute pain  Description: Control of acute pain  10/13/2021 1459 by Dale Elias RN  Outcome: Met This Shift  10/13/2021 0206 by Amanda Hernandez RN  Outcome: Met This Shift  Goal: Control of chronic pain  Description: Control of chronic pain  Outcome: Met This Shift     Problem: Falls - Risk of:  Goal: Will remain free from falls  Description: Will remain free from falls  10/13/2021 1459 by Dale Elias RN  Outcome: Met This Shift  10/13/2021 0206 by Amanda Hernandez RN  Outcome: Met This Shift  Goal: Absence of physical injury  Description: Absence of physical injury  Outcome: Met This Shift

## 2021-10-16 LAB
BLOOD CULTURE, ROUTINE: NORMAL
CULTURE, BLOOD 2: NORMAL

## 2021-10-18 ENCOUNTER — TELEPHONE (OUTPATIENT)
Dept: FAMILY MEDICINE CLINIC | Age: 46
End: 2021-10-18

## 2021-10-18 NOTE — TELEPHONE ENCOUNTER
Restarted treatment for home health on 10/14/21     Frequency   1 - week 1  2 - week 2 & 3   1- week 4     Revaluate and proceed after 4th week

## 2021-10-21 NOTE — TELEPHONE ENCOUNTER
[facility] called starting next week they will see pt.  2x a week and 1x the following week

## 2021-11-01 ENCOUNTER — TELEPHONE (OUTPATIENT)
Dept: FAMILY MEDICINE CLINIC | Age: 46
End: 2021-11-01

## 2021-11-03 ENCOUNTER — TELEPHONE (OUTPATIENT)
Dept: FAMILY MEDICINE CLINIC | Age: 46
End: 2021-11-03

## 2021-11-03 NOTE — TELEPHONE ENCOUNTER
Danny Tapia from [facility] called to let you know that pt is being discharged , hs Bp was 92/51 not c/o lightheadedness or dizziness .  Rt calf tenderness due to him falling up the steps where he hurt rt  knee , not hot to the touch , hx of blood clots , he also would like a referral for [facility] for OT fax # [phone]    Any questions contact Danny Tapia [phone]

## 2021-12-22 RX ORDER — LISINOPRIL 5 MG/1
TABLET ORAL
Qty: 90 TABLET | Refills: 0 | Status: SHIPPED
Start: 2021-12-22 | End: 2022-02-14 | Stop reason: SDUPTHER

## 2022-01-01 NOTE — CARE COORDINATION
9/1/21: Transition of care: Pt admitted to left lower ext swelling. Pt resides with his son in a 2 story house with 2 steps to enter and an additional 14 steps to the upstairs. Pt bed/bathroom is on the 2nd fl. Pt's PCP is  and he uses [facility] in [place]. Pt has a walker, cane and wheelchair at home. Pt is a DM and is managed by injections. Pt plan is to return home and currently has no DME/HHC needs. I explained to the pt he may need IV ATB therapy, depending on ID recommendation. Pt has no preferences and spoke with Doris/Alycia and the earliest she could see pt would be Sunday. Pt's family can transport him home. Pt BC4/4 +, currently on IV Vanco, Ancef, and bridging Lovenox for Coumadin. Electronically signed by Adriana Adamson RN/ on 9/1/2021 at 2:31 PM    The Plan for Transition of Care is related to the following treatment goals: [address]    The Patient and/or patient representative was provided with a choice of provider and agrees   with the discharge plan. [x] Yes [] No    Freedom of choice list was provided with basic dialogue that supports the patient's individualized plan of care/goals, treatment preferences and shares the quality data associated with the providers.  [x] Yes [] No
9/2: Pt plan is to return home currently has no DME/HHC needs. I explained to the pt he may need IV ATB therapy, depending on ID recommendation. Pt has no preferences and spoke with Abelino and the earliest she could see pt would be Sunday. Pt's family can transport him home. Pt BC4/4 +, currently on IV Daptomycin daily and bridging Lovenox for Coumadin.   INR 1.4 Electronically signed by Noah Ventura RN/ on 9/2/2021 at 11:28 AM
9/3/2021 social work transition of care planning  Pt plan remains for home. [address] following-can see pt on Sunday 9/5( if discharged on Sat 9/4). Rx will need to be faxed on Friday to  [phone](before 3pm). Await Id final plan. Pt's family will transport at discharge. Electronically signed by OSMAN Aponte on 9/3/2021 at 9:54 AM     Addendum;  faxed dapto rx to [phone]. [facility] can see pt on Sunday. Pt will need to have dose of atb prior to discharge. Will need picc line.   Electronically signed by OSMAN Aponte on 9/3/2021 at 2:00 PM
[facility]  - Vita Brumfield M.D.

## 2022-02-10 RX ORDER — LOPERAMIDE HYDROCHLORIDE 2 MG/1
CAPSULE ORAL
Qty: 120 CAPSULE | Refills: 0 | OUTPATIENT
Start: 2022-02-10

## 2022-02-10 RX ORDER — WARFARIN SODIUM 5 MG/1
TABLET ORAL
Qty: 30 TABLET | OUTPATIENT
Start: 2022-02-10

## 2022-02-14 ENCOUNTER — OFFICE VISIT (OUTPATIENT)
Dept: PRIMARY CARE CLINIC | Age: 47
End: 2022-02-14
Payer: MEDICARE

## 2022-02-14 VITALS
HEIGHT: 73 IN | BODY MASS INDEX: 37.91 KG/M2 | HEART RATE: 95 BPM | OXYGEN SATURATION: 97 % | RESPIRATION RATE: 14 BRPM | SYSTOLIC BLOOD PRESSURE: 128 MMHG | TEMPERATURE: 98 F | WEIGHT: 286 LBS | DIASTOLIC BLOOD PRESSURE: 84 MMHG

## 2022-02-14 DIAGNOSIS — E11.65 TYPE 2 DIABETES MELLITUS WITH HYPERGLYCEMIA, WITHOUT LONG-TERM CURRENT USE OF INSULIN (HCC): Primary | ICD-10-CM

## 2022-02-14 DIAGNOSIS — K45.8 RECURRENT ABDOMINAL HERNIA WITHOUT OBSTRUCTION OR GANGRENE, UNSPECIFIED HERNIA TYPE: ICD-10-CM

## 2022-02-14 DIAGNOSIS — Z79.01 ANTICOAGULATED: ICD-10-CM

## 2022-02-14 DIAGNOSIS — Z86.718 HISTORY OF DEEP VEIN THROMBOSIS: ICD-10-CM

## 2022-02-14 DIAGNOSIS — R10.31 RIGHT LOWER QUADRANT PAIN: ICD-10-CM

## 2022-02-14 DIAGNOSIS — Z76.89 ENCOUNTER TO ESTABLISH CARE: ICD-10-CM

## 2022-02-14 DIAGNOSIS — I10 ESSENTIAL HYPERTENSION: ICD-10-CM

## 2022-02-14 PROBLEM — S80.822A BLISTER OF LEFT LEG: Status: RESOLVED | Noted: 2021-08-31 | Resolved: 2022-02-14

## 2022-02-14 LAB
BILIRUBIN, POC: NORMAL
BLOOD URINE, POC: NORMAL
CLARITY, POC: CLEAR
COLOR, POC: YELLOW
CREATININE URINE POCT: 200
GLUCOSE URINE, POC: NORMAL
HBA1C MFR BLD: 6 %
INR BLD: 2.9
INTERNATIONAL NORMALIZATION RATIO, POC: 2.9
KETONES, POC: NORMAL
LEUKOCYTE EST, POC: NORMAL
MICROALBUMIN/CREAT 24H UR: 10 MG/G{CREAT}
MICROALBUMIN/CREAT UR-RTO: <30
NITRITE, POC: NORMAL
PH, POC: 5.5
PROTEIN, POC: NORMAL
PROTHROMBIN TIME, POC: NORMAL
SPECIFIC GRAVITY, POC: >=1.03
UROBILINOGEN, POC: 0.2

## 2022-02-14 PROCEDURE — 2022F DILAT RTA XM EVC RTNOPTHY: CPT | Performed by: PHYSICIAN ASSISTANT

## 2022-02-14 PROCEDURE — 3044F HG A1C LEVEL LT 7.0%: CPT | Performed by: PHYSICIAN ASSISTANT

## 2022-02-14 PROCEDURE — 83036 HEMOGLOBIN GLYCOSYLATED A1C: CPT | Performed by: PHYSICIAN ASSISTANT

## 2022-02-14 PROCEDURE — 4004F PT TOBACCO SCREEN RCVD TLK: CPT | Performed by: PHYSICIAN ASSISTANT

## 2022-02-14 PROCEDURE — G8484 FLU IMMUNIZE NO ADMIN: HCPCS | Performed by: PHYSICIAN ASSISTANT

## 2022-02-14 PROCEDURE — G8427 DOCREV CUR MEDS BY ELIG CLIN: HCPCS | Performed by: PHYSICIAN ASSISTANT

## 2022-02-14 PROCEDURE — 82044 UR ALBUMIN SEMIQUANTITATIVE: CPT | Performed by: PHYSICIAN ASSISTANT

## 2022-02-14 PROCEDURE — G8417 CALC BMI ABV UP PARAM F/U: HCPCS | Performed by: PHYSICIAN ASSISTANT

## 2022-02-14 PROCEDURE — 99204 OFFICE O/P NEW MOD 45 MIN: CPT | Performed by: PHYSICIAN ASSISTANT

## 2022-02-14 PROCEDURE — 85610 PROTHROMBIN TIME: CPT | Performed by: PHYSICIAN ASSISTANT

## 2022-02-14 RX ORDER — LISINOPRIL 5 MG/1
5 TABLET ORAL DAILY
Qty: 90 TABLET | Refills: 1 | Status: SHIPPED
Start: 2022-02-14 | End: 2022-10-05

## 2022-02-14 RX ORDER — BLOOD SUGAR DIAGNOSTIC
1 STRIP MISCELLANEOUS DAILY
Qty: 100 EACH | Refills: 3 | Status: SHIPPED
Start: 2022-02-14 | End: 2022-02-14

## 2022-02-14 RX ORDER — WARFARIN SODIUM 5 MG/1
5 TABLET ORAL EVERY EVENING
Qty: 30 TABLET | Refills: 1 | Status: SHIPPED
Start: 2022-02-14 | End: 2022-04-07

## 2022-02-14 RX ORDER — INSULIN GLARGINE 100 [IU]/ML
25 INJECTION, SOLUTION SUBCUTANEOUS NIGHTLY
Qty: 10 ML | Refills: 5 | Status: SHIPPED
Start: 2022-02-14 | End: 2022-03-08 | Stop reason: SDUPTHER

## 2022-02-14 RX ORDER — FLASH GLUCOSE SENSOR
1 KIT MISCELLANEOUS CONTINUOUS
Qty: 4 EACH | Refills: 2 | Status: SHIPPED
Start: 2022-02-14 | End: 2022-08-19 | Stop reason: SDUPTHER

## 2022-02-14 RX ORDER — FLASH GLUCOSE SENSOR
1 KIT MISCELLANEOUS CONTINUOUS
Qty: 1 EACH | Refills: 0 | Status: ON HOLD
Start: 2022-02-14 | End: 2022-10-21

## 2022-02-14 RX ORDER — LOPERAMIDE HYDROCHLORIDE 2 MG/1
CAPSULE ORAL
Qty: 120 CAPSULE | Refills: 0 | Status: SHIPPED
Start: 2022-02-14 | End: 2022-04-01 | Stop reason: SDUPTHER

## 2022-02-14 RX ORDER — METOPROLOL SUCCINATE 25 MG/1
25 TABLET, EXTENDED RELEASE ORAL DAILY
Qty: 30 TABLET | Refills: 5 | Status: ON HOLD
Start: 2022-02-14 | End: 2022-10-21 | Stop reason: HOSPADM

## 2022-02-14 RX ORDER — INSULIN GLARGINE 100 [IU]/ML
25 INJECTION, SOLUTION SUBCUTANEOUS NIGHTLY
Qty: 10 ML | Refills: 5 | Status: CANCELLED | OUTPATIENT
Start: 2022-02-14

## 2022-02-14 NOTE — PROGRESS NOTES
HPI:  The patient is a 48 y.o. male who presents today to establish care. He has a hx of IDDM, HTN, DVT on chronic anticoagulation. His a1c today is 6%. He denies hypoglycemia. He complains of lower abdominal pain, and recurrence of hernia. He states that he has had repair x 5. This is tender. He denies constipation, n/v/d. No fevers.      Past Medical History:   Diagnosis Date    Accident 11/2019    stepped on nail rt ft about 1 month ago- healed per patient    Acute thrombosis of inferior vena cava ([address] 10/10/2020    SIMÓN (acute kidney injury) ([address] 2008 apx    kidney bruised due to fall / Casey Brake    Allergic rhinitis     Blister of left leg 8/31/2021    Cellulitis of leg, left 8/31/2021    Chronic back pain     Depression     Dermatophytosis 8/31/2021    Diabetes mellitus ([address]     Difficulty sleeping     at times    Displacement of lumbar intervertebral disc without myelopathy     Fibromyalgia     Fractured rib     2008 / healed    H/O seasonal allergies     Head injury 1980'S apx    no residual s/s    History of deep vein thrombosis 8/31/2021    Hyperlipidemia     Hypertension     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    Lymphedema of left leg 8/31/2021    Obesity (BMI 35.0-39.9 without comorbidity)     bmi 39.2  weight 296 #    Osteoarthritis     Recurrent acute deep vein thrombosis (DVT) of left lower extremity ([address] 8/31/2021    S/P IVC filter 8/31/2021    Subtherapeutic international normalized ratio (INR) 8/31/2021    Thoracic or lumbosacral neuritis or radiculitis, unspecified       Past Surgical History:   Procedure Laterality Date    COLONOSCOPY N/A 9/5/2020    COLONOSCOPY WITH BIOPSY performed by Marvin Garcia MD at [address] PTC NEW ACCESS  8/3/2020    CT PTC NEW ACCESS 8/3/2020 [facility]    FOOT SURGERY Right 1985    to treat shattered bones    HERNIA REPAIR  2001    DOUBLE HERNIA    HERNIA REPAIR Right 12/9/2019    LAPAROSCOPIC RIGHT INGUINAL HERNIA REPAIR, MESH 10x15 cm PLACEMENT performed by Kayce Hardin MD at [facility] Left 4/11/2016    ILIAC ARTERY STENT INSERTION N/A 10/11/2020    S/P ILIAC STENT PLACEMENT VISUALIZATION performed by John Pink MD at [facility] N/A 9/18/2020    LAPAROTOMY EXPLORATORY, CHOLECYSTECTOMY, BOWEL RESECTION, right darian colectomy, partial omentectomy, and splenectomy performed by Emerald Peña MD at [address] DX W/COLL[address] PFRMD N/A 5/7/2018    COLONOSCOPY DIAGNOSTIC performed by Denise Pompa MD at [address] [facility] Left 2014    Dr. Fajardo Right ARTHROSCOPY Left 11 21 14    SHOULDER SURGERY  2001 &2007    RIGHT AND LEFT repair of tears    THROMBECTOMY / EMBOLECTOMY FEMORAL Left 1/1/2021    LEFT LOWER EXTREMITY, VENOGRAM, FOLLOW UP POSSIBLE REMOVAL LYSIS CATHETER performed by John Pink MD at [place] [id number] / EMBOLECTOMY FEMORAL Left 1/2/2021    LEFT LOWER EXTREMITY VENOGRAM performed by John Pink MD at [facility] Right 10/31/2016    Total right hip  Alfa Peterson MD    UPPER GASTROINTESTINAL ENDOSCOPY N/A 9/4/2020    EGD DIAGNOSTIC ONLY performed by Nasreen Larry MD at [address] [place] [address] Left 1/3/2021    LEFT LOWER EXTREMITY VENOGRAM, PLACEMENT OF NEW LYSIS CATHETER performed by John Pink MD at [place] [address]  2007    LEFT WRIST      Family History   Problem Relation Age of Onset    Hypertension Mother     Arthritis Father     Diabetes Father     Cancer Maternal Grandfather         Skin    Cancer Paternal Uncle         skin    Diabetes Paternal Grandfather     Heart Disease Maternal Grandmother     Stroke Maternal Aunt      Social History     Socioeconomic History    Marital status:      Spouse name: Not on file    Number of children: Not on file  Years of education: Not on file    Highest education level: Not on file   Occupational History    Occupation: disabled from     Tobacco Use    Smoking status: Never Smoker    Smokeless tobacco: Current User     Types: Chew   Vaping Use    Vaping Use: Never used   Substance and Sexual Activity    Alcohol use: Not Currently     Alcohol/week: 0.0 standard drinks    Drug use: No    Sexual activity: Not Currently   Other Topics Concern    Not on file   Social History Narrative    Not on file     Social Determinants of Health     Financial Resource Strain: Low Risk     Difficulty of Paying Living Expenses: Not hard at all   Food Insecurity: No Food Insecurity    Worried About [facility] in the Last Year: Never true    [facility] in the Last Year: Never true   Transportation Needs:     Lack of Transportation (Medical): Not on file    Lack of Transportation (Non-Medical):  Not on file   Physical Activity:     Days of Exercise per Week: Not on file    Minutes of Exercise per Session: Not on file   Stress:     Feeling of Stress : Not on file   Social Connections:     Frequency of Communication with Friends and Family: Not on file    Frequency of Social Gatherings with Friends and Family: Not on file    Attends Anabaptism Services: Not on file    Active Member of [address] [place] [address] or Organizations: Not on file    Attends Club or Organization Meetings: Not on file    Marital Status: Not on file   Intimate Partner Violence:     Fear of Current or Ex-Partner: Not on file    Emotionally Abused: Not on file    Physically Abused: Not on file    Sexually Abused: Not on file   Housing Stability:     Unable to Pay for Housing in the Last Year: Not on file    Number of [place] in the Last Year: Not on file    Unstable Housing in the Last Year: Not on file      Allergies   Allergen Reactions    Seasonal      HAYFEVER / sneezing,watery eyes    Tramadol Itching        Review of Systems:  Constitutional:  No fever, no fatigue, no chills, no headaches, no weight change  Dermatology:  No rash, no mole, no dry or sensitive skin  ENT:  No cough, no sore throat, no sinus pain, no runny nose, no ear pain  Cardiology:  No chest pain, no palpitations, no leg edema, no shortness of breath, no PND  Endocrinology:  No polydipsia, no polyuria, no cold intolerance, no heat intolerance, no polyphagia, no hair changes  Male Reproductive:  No penile discharge, no dysuria  Musculoskeletal:  No joint pain, no leg cramps, no back pain, no muscle aches  Respiratory:  No shortness of breath, no orthopnea, no wheezing, no GILLIAM, no hemoptysis  Urology:  No blood in the urine, no urinary frequency, no urinary incontinence, no urinary urgency, no nocturia, no dysuria  Neurology:  No numbness/tingling, no dizziness, no weakness  Psychology:  No depression, no sleep disturbances, no suicidal ideation, no anxiety    Vitals:    02/14/22 1254   BP: 128/84   Pulse: 95   Resp: 14   Temp: 98 °F (36.7 °C)   SpO2: 97%   Weight: 286 lb (129.7 kg)   Height: 6' 1\" (1.854 m)       Physical Exam  Constitutional:       General: He is not in acute distress. Appearance: He is well-developed. He is obese. HENT:      Head: Normocephalic and atraumatic. Right Ear: External ear normal.      Left Ear: External ear normal.      Nose: Nose normal.   Eyes:      General: No scleral icterus. Conjunctiva/sclera: Conjunctivae normal.      Pupils: Pupils are equal, round, and reactive to light. Neck:      Thyroid: No thyromegaly. Cardiovascular:      Rate and Rhythm: Normal rate and regular rhythm. Heart sounds: Normal heart sounds. No murmur heard. Pulmonary:      Effort: Pulmonary effort is normal. No accessory muscle usage or respiratory distress. Breath sounds: Normal breath sounds. No wheezing. Abdominal:      General: Bowel sounds are normal. There is no distension. Palpations: Abdomen is soft. Tenderness: There is no abdominal tenderness. There is no guarding or rebound. Hernia: A hernia (left lower abd) is present. Musculoskeletal:         General: Normal range of motion. Cervical back: Normal range of motion and neck supple. Skin:     General: Skin is warm and dry. Findings: No rash. Neurological:      Mental Status: He is alert and oriented to person, place, and time. Deep Tendon Reflexes: Reflexes are normal and symmetric. Psychiatric:         Speech: Speech normal.         Behavior: Behavior normal.         Assessment/Plan:      Radha Herrera was seen today for new patient and peripheral neuropathy. Diagnoses and all orders for this visit:    Type 2 diabetes mellitus with hyperglycemia, without long-term current use of insulin ([facility])  -     Discontinue: blood glucose test strips (EVENCARE G2 TEST) strip; 1 each by In Vitro route daily As needed. -     POCT glycosylated hemoglobin (Hb A1C)  -     POCT Microalbumin  -     insulin glargine (LANTUS) 100 UNIT/ML injection vial; Inject 25 Units into the skin nightly  -     Continuous Blood Gluc  (FREESTYLE JOANIE READER) SAMARIA; 1 Device by Does not apply route continuous  -     Continuous Blood Gluc Sensor (FREESTYLE JOANIE 14 DAY SENSOR) MISC; 1 Device by Does not apply route continuous    Essential hypertension  -     lisinopril (PRINIVIL;ZESTRIL) 5 MG tablet; Take 1 tablet by mouth daily  -     metoprolol succinate (TOPROL XL) 25 MG extended release tablet; Take 1 tablet by mouth daily  -     BASIC METABOLIC PANEL; Future  -     CBC Auto Differential; Future    Encounter to establish care    History of deep vein thrombosis  -     warfarin (COUMADIN) 5 MG tablet;  Take 1 tablet by mouth every evening Given with Warfarin 2.5 mg total dose Warfarin 7.5 mg daily    Anticoagulated  -     POCT INR    Recurrent abdominal hernia without obstruction or gangrene, unspecified hernia type  -     Benita Blankenship MD, General Surgery, [place]  -     CT ABDOMEN PELVIS W IV CONTRAST Additional Contrast? None; Future  -     CBC Auto Differential; Future    Right lower quadrant pain  -     CT ABDOMEN PELVIS W IV CONTRAST Additional Contrast? None; Future  -     POCT Urinalysis no Micro    Other orders  -     loperamide (IMODIUM) 2 MG capsule; take 1 capsule by mouth four times a day if needed for diarrhea      As above. Call or go to ED immediately if symptoms worsen or persist.  Return in about 3 months (around 5/14/2022). , or sooner if necessary. Educational materials and/or home exercises printed for patient's review and were included in patient instructions on his/her After Visit Summary and given to patient at the end of visit. Counseled regarding above diagnosis, including possible risks and complications,  especially if left uncontrolled. Counseled regarding the possible side effects, risks, benefits and alternatives to treatment; patient and/or guardian verbalizes understanding, agrees, feels comfortable with and wishes to proceed with above treatment plan. Advised patient to call with any new medication issues, and read all Rx info from pharmacy to assure aware of all possible risks and side effects of medication before taking. Reviewed age and gender appropriate health screening exams and vaccinations. Advised patient regarding importance of keeping up with recommended health maintenance and to schedule as soon as possible if overdue, as this is important in assessing for undiagnosed pathology, especially cancer, as well as protecting against potentially harmful/life threatening disease. Patient and/or guardian verbalizes understanding and agrees with above counseling, assessment and plan. All questions answered. Lyle Vicente PA-C  2/14/2022    I have personally reviewed and updated the chief complaint, HPI, Past Medical, Family and Social History, as well as the above Review of Systems.

## 2022-02-16 ENCOUNTER — TELEPHONE (OUTPATIENT)
Dept: PRIMARY CARE CLINIC | Age: 47
End: 2022-02-16

## 2022-02-16 ENCOUNTER — TELEPHONE (OUTPATIENT)
Dept: ORTHOPEDIC SURGERY | Age: 47
End: 2022-02-16

## 2022-02-16 DIAGNOSIS — M25.562 CHRONIC PAIN OF BOTH KNEES: Primary | ICD-10-CM

## 2022-02-16 DIAGNOSIS — M25.561 CHRONIC PAIN OF BOTH KNEES: Primary | ICD-10-CM

## 2022-02-16 DIAGNOSIS — G89.29 CHRONIC PAIN OF BOTH KNEES: Primary | ICD-10-CM

## 2022-02-16 NOTE — TELEPHONE ENCOUNTER
----- Message from Dulcecamden Mckinnon sent at 2/16/2022 10:16 AM EST -----  Subject: Referral Request    QUESTIONS   Reason for referral request? referral for knees   Has the physician seen you for this condition before? No   Preferred Specialist (if applicable)? Do you already have an appointment scheduled? No  Additional Information for Provider?   ---------------------------------------------------------------------------  --------------  CALL BACK INFO  What is the best way for the office to contact you? OK to leave message on   voicemail  Preferred Call Back Phone Number?  [phone]

## 2022-02-16 NOTE — TELEPHONE ENCOUNTER
Patient is requesting to be see for Bilateral hip pain. LOV 5/28/20. No soon appointments to offer. Please contact patient to schedule.

## 2022-02-17 NOTE — TELEPHONE ENCOUNTER
Call placed to patient.   Future Appointments   Date Time Provider Elisabeth Miller   [facility]2022  1[facility]45 [facility]   [facility]  [facility]   5/23/2022 12:30 PM MOMO [facility]

## 2022-02-24 ENCOUNTER — TELEPHONE (OUTPATIENT)
Dept: ORTHOPEDIC SURGERY | Age: 47
End: 2022-02-24

## 2022-02-24 DIAGNOSIS — M16.12 PRIMARY OSTEOARTHRITIS OF LEFT HIP: Primary | ICD-10-CM

## 2022-02-24 DIAGNOSIS — M17.0 PRIMARY OSTEOARTHRITIS OF BOTH KNEES: ICD-10-CM

## 2022-03-03 ENCOUNTER — TELEPHONE (OUTPATIENT)
Dept: SURGERY | Age: 47
End: 2022-03-03

## 2022-03-03 ENCOUNTER — HOSPITAL ENCOUNTER (OUTPATIENT)
Dept: GENERAL RADIOLOGY | Age: 47
Discharge: HOME | End: 2022-03-05
Payer: MEDICARE

## 2022-03-03 ENCOUNTER — OFFICE VISIT (OUTPATIENT)
Dept: ORTHOPEDIC SURGERY | Age: 47
End: 2022-03-03
Payer: MEDICARE

## 2022-03-03 VITALS — TEMPERATURE: 98.6 F

## 2022-03-03 DIAGNOSIS — M16.12 PRIMARY OSTEOARTHRITIS OF LEFT HIP: ICD-10-CM

## 2022-03-03 DIAGNOSIS — M17.0 PRIMARY OSTEOARTHRITIS OF BOTH KNEES: ICD-10-CM

## 2022-03-03 DIAGNOSIS — M17.11 PRIMARY OSTEOARTHRITIS OF RIGHT KNEE: Primary | ICD-10-CM

## 2022-03-03 PROCEDURE — 73565 X-RAY EXAM OF KNEES: CPT

## 2022-03-03 PROCEDURE — 73521 X-RAY EXAM HIPS BI 2 VIEWS: CPT

## 2022-03-03 PROCEDURE — G8427 DOCREV CUR MEDS BY ELIG CLIN: HCPCS | Performed by: ORTHOPAEDIC SURGERY

## 2022-03-03 PROCEDURE — 99213 OFFICE O/P EST LOW 20 MIN: CPT | Performed by: ORTHOPAEDIC SURGERY

## 2022-03-03 PROCEDURE — 99212 OFFICE O/P EST SF 10 MIN: CPT | Performed by: ORTHOPAEDIC SURGERY

## 2022-03-03 PROCEDURE — 4004F PT TOBACCO SCREEN RCVD TLK: CPT | Performed by: ORTHOPAEDIC SURGERY

## 2022-03-03 PROCEDURE — 20610 DRAIN/INJ JOINT/BURSA W/O US: CPT | Performed by: ORTHOPAEDIC SURGERY

## 2022-03-03 PROCEDURE — G8417 CALC BMI ABV UP PARAM F/U: HCPCS | Performed by: ORTHOPAEDIC SURGERY

## 2022-03-03 PROCEDURE — 2500000003 HC RX 250 WO HCPCS

## 2022-03-03 PROCEDURE — G8484 FLU IMMUNIZE NO ADMIN: HCPCS | Performed by: ORTHOPAEDIC SURGERY

## 2022-03-03 PROCEDURE — 6360000002 HC RX W HCPCS

## 2022-03-03 RX ORDER — TRIAMCINOLONE ACETONIDE 40 MG/ML
40 INJECTION, SUSPENSION INTRA-ARTICULAR; INTRAMUSCULAR ONCE
Status: COMPLETED | OUTPATIENT
Start: 2022-03-03 | End: 2022-03-03

## 2022-03-03 RX ORDER — BUPIVACAINE HYDROCHLORIDE 2.5 MG/ML
3 INJECTION, SOLUTION EPIDURAL; INFILTRATION; INTRACAUDAL ONCE
Status: COMPLETED | OUTPATIENT
Start: 2022-03-03 | End: 2022-03-03

## 2022-03-03 RX ORDER — LIDOCAINE HYDROCHLORIDE 10 MG/ML
3 INJECTION, SOLUTION EPIDURAL; INFILTRATION; INTRACAUDAL; PERINEURAL ONCE
Status: COMPLETED | OUTPATIENT
Start: 2022-03-03 | End: 2022-03-03

## 2022-03-03 RX ADMIN — BUPIVACAINE HYDROCHLORIDE 7.5 MG: 2.5 INJECTION, SOLUTION EPIDURAL; INFILTRATION; INTRACAUDAL at 15:10

## 2022-03-03 RX ADMIN — TRIAMCINOLONE ACETONIDE 40 MG: 40 INJECTION, SUSPENSION INTRA-ARTICULAR; INTRAMUSCULAR at 15:10

## 2022-03-03 RX ADMIN — LIDOCAINE HYDROCHLORIDE 3 ML: 10 INJECTION, SOLUTION EPIDURAL; INFILTRATION; INTRACAUDAL; PERINEURAL at 15:10

## 2022-03-03 NOTE — FLOWSHEET NOTE
LVM and callback # to have patient brother assist in scheduling a 6mo follow-up with Dr. Villeda (virtual or in-person) with a PSA prior.   Pt to surgery via bed with ekos catheter intact and functioning/tpa, heparin & NS infusing

## 2022-03-03 NOTE — TELEPHONE ENCOUNTER
Pt was no show on 3/1/22 appt with Dr. Liane Osgood. Attempted to call the patient, no answer, left message on the phone. No show letter mailed.   Electronically signed by Alfred Purcell on 3/3/22 at 11:32 AM EST

## 2022-03-03 NOTE — PROGRESS NOTES
Oh Ji II a 48year old male, presents today for bilateral hip and bilateral knee pain. History of left hip 4- AIYANA and right hip AIYANA 11- Dr Michael Solares. Right hip started hurting again. States right knee is worse than left. Omaira Barrera going up the steps approximately 3 months ago, had history of blood clots. Difficulty with transitions, steps, \"lockes up\" - right one only. Not using any medical devices at this time.

## 2022-03-03 NOTE — PROGRESS NOTES
Chief Complaint   Patient presents with    Follow Up After Procedure     B/L hip and B/L knee        SUBJECTIVE: Luiz Lehman II is a 48 y.o. male who presents to office due to the above chief complaint. Patient has multiple orthopedic complaints at today's visit but most notably is experiencing right knee pain. He reports that his knee pain is been present for approximately 3 months and feels like \"rocks grinding in my knee\". Patient denies any recent trauma to the area. Also complaining of grinding, locking, and popping. Pain is worsened with getting in and out of bed, up and out of chairs, and going up and down stairs. Patient has not tried any intervention up to this point. Review of Systems   Constitutional: Negative for fever, chills, diaphoresis, appetite change and fatigue. HENT: Negative for dental issues, hearing loss and tinnitus. Negative for congestion, sinus pressure, sneezing, sore throat. Negative for headache. Eyes: Negative for visual disturbance, blurred and double vision. Negative for pain, discharge, redness and itching  Respiratory: Negative for cough, shortness of breath and wheezing. Cardiovascular: Negative for chest pain, palpitations and leg swelling. No dyspnea on exertion   Gastrointestinal:   Negative for nausea, vomiting, abdominal pain, diarrhea, constipation  or black or bloody. Hematologic\Lymphatic:  negative for bleeding, petechiae,   Genitourinary: Negative for hematuria and difficulty urinating. Musculoskeletal: Negative for neck pain and stiffness. Negative for back pain, see HPI  Skin: Negative for pallor, rash and wound. Neurological: Negative for dizziness, tremors, seizures, weakness, light-headedness, no TIA or stroke symptoms. No numbness and headaches. Psychiatric/Behavioral: Negative. OBJECTIVE:      Physical Examination:   General appearance: alert, well appearing, and in no distress,  normal appearing weight.  No visible signs of trauma   Mental status: alert, oriented to person, place, and time, normal mood, behavior, speech, dress, motor activity, and thought processes  Abdomen: soft, nondistended  Resp:   resp easy and unlabored, no audible wheezes note, normal symmetrical expansion of both hemithoraces  Cardiac: distal pulses palpable, skin and extremities well perfused  Neurological: alert, oriented X3, normal speech, no focal findings or movement disorder noted, motor and sensory grossly normal bilaterally, normal muscle tone, no tremors, strength 5/5, normal gait and station  HEENT: normochephalic atraumatic, external ears and eyes normal, sclera normal, neck supple, no nasal discharge. Extremities:   peripheral pulses normal, no edema, redness or tenderness in the calves   Skin: normal coloration, no rashes or open wounds, no suspicious skin lesions noted  Psych: Affect euthymic   Musculoskeletal:   Extremity:  Right lower extremity  · No knee effusion  · Knee range of motion 0-115 degrees  · Medial and lateral joint line tenderness, medial> lateral  · Crepitance palpable with knee range of motion  · Positive Mary's  · Negative Lachman  · Compartments soft and compressible  · Calf soft and non tender  · +PF/DF/EHL  · +2/4 DP & PT pulses, Brisk Cap refill, Toes warm and perfused  · Distal sensation grossly intact to Peroneals, Sural, Saphenous, and tibial nrs    Temp 98.6 °F (37 °C) (Oral)      XR: 3/3/22   X-ray bilateral knees: Bilateral knee joint space well maintained. No fractures or dislocations appreciated. No acute osseous abnormalities noted. X-ray bilateral hip: S/p bilateral AIYANA. Hardware is appropriately positioned with no evidence of loosening. No fractures or dislocations appreciated. Left vascular stent noted. Impression:   R knee arthritic flare vs degenerative meniscus tear     ASSESSMENT:     Diagnosis Orders   1.  Primary osteoarthritis of right knee Stable but not controlled        PLAN:  Right knee corticosteroid injection administered at today's visit. A steroid injection was performed at right lateral parapatellar approach using 1% plain Lidocaine and 30 mg of Kenalog. This was well tolerated. Monitor for improvement in pain. Patient pain is not improved we will proceed with an MRI to further evaluate source of right knee pain. Multimodal OTC pain control  Weightbearing as tolerated right lower extremity  Rest, ice, elevate, compress right lower extremity  Follow up in 2-3 weeks  Follow up PRN for elbow pain  Patient seen and examined with Dr. Tunde Lux      Electronically signed by Ravindra Ruiz DO on 3/3/2022 at 2:52 PM     Orthopaedic Attending    I have seen and evaluated the patient with the resident and agree with the above assessments on today's visit. I have performed the key components of the history and physical examination and concur completely with the findings and plans as documented above. Patient seen today for right knee pain. He has underlying osteoarthritis. We discussed treatment options, patient elected for CSI injection today. Right knee CSI provided today in office without issue. Discussed if this is unable to improve his symptoms could consider MRI in the future to evaluate for soft tissue pathologies. Discussed activity modifications, follow-up in 2-3 weeks for repeat evaluation or sooner if needed. Electronically signed by   Riley Barron DO  3/3/2022       Note: This report was completed using [employer] voiced recognition software. Every effort has been made to ensure accuracy; however, inadvertent computerized transcription errors may be present.

## 2022-03-08 DIAGNOSIS — E11.65 TYPE 2 DIABETES MELLITUS WITH HYPERGLYCEMIA, WITHOUT LONG-TERM CURRENT USE OF INSULIN (HCC): ICD-10-CM

## 2022-03-08 RX ORDER — INSULIN GLARGINE 100 [IU]/ML
25 INJECTION, SOLUTION SUBCUTANEOUS NIGHTLY
Qty: 10 ML | Refills: 5 | Status: SHIPPED
Start: 2022-03-08 | End: 2022-08-12 | Stop reason: SDUPTHER

## 2022-03-08 NOTE — PROGRESS NOTES
Detail Level: Detailed Occupational Therapy  Attempted OT eval at b/s 2x this AM.  Pt declined to participate both times citing moderately severe abdominal pain and fatigue. RN and Pt reported pt is ambulating to/from the bathroom and completing his own toileting but w/ significantly increased HR w/ this Ax. Provided Urinal and advised pt about the possible use of BSC should elevated HR be detrimental.  Will attempt OT eval at a later time.   Thank you for this referral.  LINUS Israel, OTR/L  # [phone] No

## 2022-03-29 ENCOUNTER — OFFICE VISIT (OUTPATIENT)
Dept: SURGERY | Age: 47
End: 2022-03-29
Payer: MEDICARE

## 2022-03-29 ENCOUNTER — HOSPITAL ENCOUNTER (OUTPATIENT)
Age: 47
Discharge: HOME | End: 2022-03-29
Payer: MEDICARE

## 2022-03-29 VITALS
SYSTOLIC BLOOD PRESSURE: 138 MMHG | OXYGEN SATURATION: 97 % | HEIGHT: 73 IN | RESPIRATION RATE: 18 BRPM | TEMPERATURE: 98.1 F | WEIGHT: 287 LBS | DIASTOLIC BLOOD PRESSURE: 88 MMHG | BODY MASS INDEX: 38.04 KG/M2 | HEART RATE: 89 BPM

## 2022-03-29 DIAGNOSIS — K43.2 INCISIONAL HERNIA OF ANTERIOR ABDOMINAL WALL WITHOUT OBSTRUCTION OR GANGRENE: Primary | ICD-10-CM

## 2022-03-29 DIAGNOSIS — K45.8 RECURRENT ABDOMINAL HERNIA WITHOUT OBSTRUCTION OR GANGRENE, UNSPECIFIED HERNIA TYPE: ICD-10-CM

## 2022-03-29 DIAGNOSIS — I10 ESSENTIAL HYPERTENSION: ICD-10-CM

## 2022-03-29 LAB
ANION GAP SERPL CALCULATED.3IONS-SCNC: 16 MMOL/L (ref 7–16)
BASOPHILS ABSOLUTE: 0.1 E9/L (ref 0–0.2)
BASOPHILS RELATIVE PERCENT: 0.9 % (ref 0–2)
BUN BLDV-MCNC: 7 MG/DL (ref 6–20)
CALCIUM SERPL-MCNC: 9.8 MG/DL (ref 8.6–10.2)
CHLORIDE BLD-SCNC: 106 MMOL/L (ref 98–107)
CO2: 21 MMOL/L (ref 22–29)
CREAT SERPL-MCNC: 1.1 MG/DL (ref 0.7–1.2)
EOSINOPHILS ABSOLUTE: 0.28 E9/L (ref 0.05–0.5)
EOSINOPHILS RELATIVE PERCENT: 2.4 % (ref 0–6)
GFR AFRICAN AMERICAN: >60
GFR NON-AFRICAN AMERICAN: >60 ML/MIN/1.73
GLUCOSE BLD-MCNC: 99 MG/DL (ref 74–99)
HCT VFR BLD CALC: 45 % (ref 37–54)
HEMOGLOBIN: 15 G/DL (ref 12.5–16.5)
IMMATURE GRANULOCYTES #: 0.03 E9/L
IMMATURE GRANULOCYTES %: 0.3 % (ref 0–5)
LYMPHOCYTES ABSOLUTE: 2.52 E9/L (ref 1.5–4)
LYMPHOCYTES RELATIVE PERCENT: 21.9 % (ref 20–42)
MCH RBC QN AUTO: 29.5 PG (ref 26–35)
MCHC RBC AUTO-ENTMCNC: 33.3 % (ref 32–34.5)
MCV RBC AUTO: 88.6 FL (ref 80–99.9)
MONOCYTES ABSOLUTE: 0.9 E9/L (ref 0.1–0.95)
MONOCYTES RELATIVE PERCENT: 7.8 % (ref 2–12)
NEUTROPHILS ABSOLUTE: 7.7 E9/L (ref 1.8–7.3)
NEUTROPHILS RELATIVE PERCENT: 66.7 % (ref 43–80)
PDW BLD-RTO: 14 FL (ref 11.5–15)
PLATELET # BLD: 497 E9/L (ref 130–450)
PMV BLD AUTO: 9.2 FL (ref 7–12)
POTASSIUM SERPL-SCNC: 4.1 MMOL/L (ref 3.5–5)
RBC # BLD: 5.08 E12/L (ref 3.8–5.8)
SODIUM BLD-SCNC: 143 MMOL/L (ref 132–146)
WBC # BLD: 11.5 E9/L (ref 4.5–11.5)

## 2022-03-29 PROCEDURE — G8417 CALC BMI ABV UP PARAM F/U: HCPCS | Performed by: SURGERY

## 2022-03-29 PROCEDURE — G8484 FLU IMMUNIZE NO ADMIN: HCPCS | Performed by: SURGERY

## 2022-03-29 PROCEDURE — 80048 BASIC METABOLIC PNL TOTAL CA: CPT

## 2022-03-29 PROCEDURE — G8427 DOCREV CUR MEDS BY ELIG CLIN: HCPCS | Performed by: SURGERY

## 2022-03-29 PROCEDURE — 99213 OFFICE O/P EST LOW 20 MIN: CPT | Performed by: SURGERY

## 2022-03-29 PROCEDURE — 85025 COMPLETE CBC W/AUTO DIFF WBC: CPT

## 2022-03-29 PROCEDURE — 36415 COLL VENOUS BLD VENIPUNCTURE: CPT

## 2022-03-29 PROCEDURE — 4004F PT TOBACCO SCREEN RCVD TLK: CPT | Performed by: SURGERY

## 2022-03-29 NOTE — PROGRESS NOTES
[facility]   History and Physical    Patient's Name/Date of Birth: Rolena Merlin / 1975 (48 y.o.)      PCP: Shira Bansal PA-C      CC:  hernia    HPI:  48 y.o. male  Incisional hernia. Has sx pain swelling in abdomen. Affects most of his daily activities. No obstructive sx. 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 Complicated medical hx, had ex lap for R sided colitis sx had R darian, lap adriano, splenectomy. No signs of crohn's disease on path.       Past Medical History:   Diagnosis Date    Accident 11/2019    stepped on nail rt ft about 1 month ago- healed per patient    Acute thrombosis of inferior vena cava ([address] 10/10/2020    SIMÓN (acute kidney injury) ([address] 2008 apx    kidney bruised due to fall / Brendolyn Councilman    Allergic rhinitis     Blister of left leg 8/31/2021    Cellulitis of leg, left 8/31/2021    Chronic back pain     Depression     Dermatophytosis 8/31/2021    Diabetes mellitus ([address]     Difficulty sleeping     at times    Displacement of lumbar intervertebral disc without myelopathy     Fibromyalgia     Fractured rib     2008 / healed    H/O seasonal allergies     Head injury 1980'S apx    no residual s/s    History of deep vein thrombosis 8/31/2021    Hyperlipidemia     Hypertension     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    Lymphedema of left leg 8/31/2021    Obesity (BMI 35.0-39.9 without comorbidity)     bmi 39.2  weight 296 #    Osteoarthritis     Recurrent acute deep vein thrombosis (DVT) of left lower extremity ([address] 8/31/2021    S/P IVC filter 8/31/2021    Subtherapeutic international normalized ratio (INR) 8/31/2021    Thoracic or lumbosacral neuritis or radiculitis, unspecified        Past Surgical History:   Procedure Laterality Date    COLONOSCOPY N/A 9/5/2020    COLONOSCOPY WITH BIOPSY performed by Maverick Casas MD at [address] [facility] NEW ACCESS  8/3/2020    [facility] NEW ACCESS 8/3/2020 [facility]    FOOT SURGERY Right 1985 to treat shattered bones    HERNIA REPAIR  2001    DOUBLE HERNIA    HERNIA REPAIR Right 12/9/2019    LAPAROSCOPIC RIGHT INGUINAL HERNIA REPAIR, MESH 10x15 cm PLACEMENT performed by Ant Snell MD at [facility] Left 4/11/2016    ILIAC ARTERY STENT INSERTION N/A 10/11/2020    S/P ILIAC STENT PLACEMENT VISUALIZATION performed by Christie Saba MD at [place] [address] N/A 9/18/2020    LAPAROTOMY EXPLORATORY, CHOLECYSTECTOMY, BOWEL RESECTION, right darian colectomy, partial omentectomy, and splenectomy performed by Elba Young MD at [address] FLX DX W/COLLJ [place] 1978 PFRMD N/A 5/7/2018    COLONOSCOPY DIAGNOSTIC performed by Sarah Winters MD at [facility] Left 2014    Dr. Salvador Cooper ARTHROSCOPY Left 11 21 14    SHOULDER SURGERY  2001 &2007    RIGHT AND LEFT repair of tears    THROMBECTOMY / EMBOLECTOMY FEMORAL Left 1/1/2021    LEFT LOWER EXTREMITY, VENOGRAM, FOLLOW UP POSSIBLE REMOVAL LYSIS CATHETER performed by Christie Saba MD at [place] [address] / EMBOLECTOMY FEMORAL Left 1/2/2021    LEFT LOWER EXTREMITY VENOGRAM performed by Christie Saba MD at [address] [place] [address] Right 10/31/2016    Total right hip  Earline Javier MD    UPPER GASTROINTESTINAL ENDOSCOPY N/A 9/4/2020    EGD DIAGNOSTIC ONLY performed by Edward Elias MD at [facility] Left 1/3/2021    LEFT LOWER EXTREMITY VENOGRAM, PLACEMENT OF NEW LYSIS CATHETER performed by Christie Saba MD at [place] [address]  2007    LEFT WRIST       Family History   Problem Relation Age of Onset    Hypertension Mother     Arthritis Father     Diabetes Father     Cancer Maternal Grandfather         Skin    Cancer Paternal Uncle         skin    Diabetes Paternal Grandfather     Heart Disease Maternal Grandmother     Stroke Maternal Aunt        Social History     Socioeconomic History    Marital status:      Spouse name: Not on file    Number of children: Not on file    Years of education: Not on file    Highest education level: Not on file   Occupational History    Occupation: disabled from     Tobacco Use    Smoking status: Never Smoker    Smokeless tobacco: Current User     Types: Chew   Vaping Use    Vaping Use: Never used   Substance and Sexual Activity    Alcohol use: Not Currently     Alcohol/week: 0.0 standard drinks    Drug use: No    Sexual activity: Not Currently   Other Topics Concern    Not on file   Social History Narrative    Not on file     Social Determinants of Health     Financial Resource Strain: Low Risk     Difficulty of Paying Living Expenses: Not hard at all   Food Insecurity: No Food Insecurity    Worried About [employer] [employer] in the Last Year: Never true    [address] [employer] in the Last Year: Never true   Transportation Needs:     Lack of Transportation (Medical): Not on file    Lack of Transportation (Non-Medical):  Not on file   Physical Activity:     Days of Exercise per Week: Not on file    Minutes of Exercise per Session: Not on file   Stress:     Feeling of Stress : Not on file   Social Connections:     Frequency of Communication with Friends and Family: Not on file    Frequency of Social Gatherings with Friends and Family: Not on file    Attends Congregation Services: Not on file    Active Member of Clubs or Organizations: Not on file    Attends Club or Organization Meetings: Not on file    Marital Status: Not on file   Intimate Partner Violence:     Fear of Current or Ex-Partner: Not on file    Emotionally Abused: Not on file    Physically Abused: Not on file    Sexually Abused: Not on file   Housing Stability:     Unable to Pay for Housing in the Last Year: Not on file    Number of [place] in the Last Year: Not on file    Unstable Housing in the Last Year: Not on file       Current Outpatient Medications   Medication Sig Dispense Refill    insulin glargine (LANTUS) 100 UNIT/ML injection vial Inject 25 Units into the skin nightly 10 mL 5    Insulin Syringe-Needle U-100 30G X 5/16\" 1 ML MISC 1 each by Does not apply route daily Use as directed 100 each 5    lisinopril (PRINIVIL;ZESTRIL) 5 MG tablet Take 1 tablet by mouth daily 90 tablet 1    loperamide (IMODIUM) 2 MG capsule take 1 capsule by mouth four times a day if needed for diarrhea 120 capsule 0    metoprolol succinate (TOPROL XL) 25 MG extended release tablet Take 1 tablet by mouth daily 30 tablet 5    warfarin (COUMADIN) 5 MG tablet Take 1 tablet by mouth every evening Given with Warfarin 2.5 mg total dose Warfarin 7.5 mg daily 30 tablet 1    Continuous Blood Gluc  (FREESTYLE JOANIE READER) SAMARIA 1 Device by Does not apply route continuous 1 each 0    Continuous Blood Gluc Sensor (FREESTYLE JOANIE 14 DAY SENSOR) MISC 1 Device by Does not apply route continuous 4 each 2    cyclobenzaprine (FLEXERIL) 10 MG tablet Take 10 mg by mouth 2 times daily as needed for Muscle spasms      ibuprofen (ADVIL;MOTRIN) 200 MG tablet Take 400 mg by mouth every 6 hours as needed for Pain      warfarin (COUMADIN) 2.5 MG tablet Take as directed 90 tablet 1    melatonin 3 MG TABS tablet Take 3 mg by mouth nightly as needed (sleep)      hydroxyurea (HYDREA) 500 MG chemo capsule Take 500 mg by mouth 2 times daily        No current facility-administered medications for this visit.        Allergies   Allergen Reactions    Seasonal      HAYFEVER / sneezing,watery eyes    Tramadol Itching       REVIEW OF SYSTEMS:    Constitutional: negative   Eyes: negative  Ears, nose, mouth, throat, and face: negative  Respiratory: negative  Cardiovascular: negative  Gastrointestinal: negative  Genitourinary:negative  Integument/breast: negative  Hematologic/lymphatic: negative  Musculoskeletal:negative  Neurological:

## 2022-04-01 RX ORDER — IBUPROFEN 200 MG
400 TABLET ORAL EVERY 6 HOURS PRN
Qty: 120 TABLET | Refills: 1 | Status: SHIPPED
Start: 2022-04-01 | End: 2022-05-11

## 2022-04-01 RX ORDER — LOPERAMIDE HYDROCHLORIDE 2 MG/1
CAPSULE ORAL
Qty: 120 CAPSULE | Refills: 0 | Status: SHIPPED
Start: 2022-04-01 | End: 2022-04-29 | Stop reason: SDUPTHER

## 2022-04-03 ENCOUNTER — HOSPITAL ENCOUNTER (OUTPATIENT)
Dept: CT IMAGING | Age: 47
Discharge: HOME | End: 2022-04-05
Payer: MEDICARE

## 2022-04-03 DIAGNOSIS — R10.31 RIGHT LOWER QUADRANT PAIN: ICD-10-CM

## 2022-04-03 DIAGNOSIS — K45.8 RECURRENT ABDOMINAL HERNIA WITHOUT OBSTRUCTION OR GANGRENE, UNSPECIFIED HERNIA TYPE: ICD-10-CM

## 2022-04-03 PROCEDURE — 74177 CT ABD & PELVIS W/CONTRAST: CPT

## 2022-04-03 PROCEDURE — 6360000004 HC RX CONTRAST MEDICATION: Performed by: EMERGENCY MEDICINE

## 2022-04-03 PROCEDURE — 2580000003 HC RX 258: Performed by: EMERGENCY MEDICINE

## 2022-04-03 RX ORDER — SODIUM CHLORIDE 0.9 % (FLUSH) 0.9 %
10 SYRINGE (ML) INJECTION ONCE
Status: COMPLETED | OUTPATIENT
Start: 2022-04-03 | End: 2022-04-03

## 2022-04-03 RX ADMIN — IOPAMIDOL 90 ML: 755 INJECTION, SOLUTION INTRAVENOUS at 07:39

## 2022-04-03 RX ADMIN — Medication 10 ML: at 07:38

## 2022-04-07 DIAGNOSIS — Z86.718 HISTORY OF DEEP VEIN THROMBOSIS: ICD-10-CM

## 2022-04-07 RX ORDER — WARFARIN SODIUM 5 MG/1
TABLET ORAL
Qty: 30 TABLET | Refills: 1 | Status: SHIPPED
Start: 2022-04-07 | End: 2022-06-10

## 2022-04-07 RX ORDER — WARFARIN SODIUM 2.5 MG/1
TABLET ORAL
Qty: 90 TABLET | Refills: 1 | Status: SHIPPED
Start: 2022-04-07 | End: 2022-09-27

## 2022-04-13 ENCOUNTER — TELEPHONE (OUTPATIENT)
Dept: SURGERY | Age: 47
End: 2022-04-13

## 2022-04-13 NOTE — TELEPHONE ENCOUNTER
Call him, back, I apologize I didn't see his result pop up for me! He does have a small hernia at the incision site which I can fix for him. I would do it open as apposed to laparoscopic and I would use mesh. He can come in for an official visit to talk about it more if he prefers?     prema

## 2022-04-13 NOTE — TELEPHONE ENCOUNTER
Received a call from the patient who stated that he would like to know his recent CT result and plan the surgery. Forwarded message to Dr. Izzy Hampton.   Electronically signed by Chriss Avendaño on 4/13/2022 at 9:20 AM

## 2022-04-13 NOTE — TELEPHONE ENCOUNTER
Attempted to call the patient. No answer, left message on the phone to call back our office.    Electronically signed by Smita Nuñez on 4/13/2022 at 1:45 PM

## 2022-04-18 ENCOUNTER — TELEPHONE (OUTPATIENT)
Dept: SURGERY | Age: 47
End: 2022-04-18

## 2022-04-18 NOTE — TELEPHONE ENCOUNTER
Spoke with the patient on the phone. The patient stated he wants to schedule the procedure as soon as he can. The patient is agreeable for open incisional hernia repair with mesh. MA told him that MA will talk with Dr. Scott Summers next week for possible medical clearance and coumadin bridge and will talk with the patient with procedure date and time. The patient verbalized understanding.   Electronically signed by Lainey Thrasher on 4/18/2022 at 11:17 AM

## 2022-04-25 NOTE — TELEPHONE ENCOUNTER
Spoke with Marybeth murray. Per staff, she will call the patient and schedule the appt for his medical clearance.    Electronically signed by John Liu on 4/25/2022 at 3:05 PM

## 2022-04-29 ENCOUNTER — OFFICE VISIT (OUTPATIENT)
Dept: PRIMARY CARE CLINIC | Age: 47
End: 2022-04-29
Payer: MEDICARE

## 2022-04-29 VITALS
TEMPERATURE: 97.1 F | WEIGHT: 298 LBS | OXYGEN SATURATION: 97 % | HEART RATE: 78 BPM | SYSTOLIC BLOOD PRESSURE: 128 MMHG | BODY MASS INDEX: 39.49 KG/M2 | HEIGHT: 73 IN | RESPIRATION RATE: 18 BRPM | DIASTOLIC BLOOD PRESSURE: 88 MMHG

## 2022-04-29 DIAGNOSIS — K43.2 INCISIONAL HERNIA, WITHOUT OBSTRUCTION OR GANGRENE: ICD-10-CM

## 2022-04-29 DIAGNOSIS — J30.2 SEASONAL ALLERGIES: ICD-10-CM

## 2022-04-29 DIAGNOSIS — K52.9 CHRONIC DIARRHEA: ICD-10-CM

## 2022-04-29 DIAGNOSIS — E11.9 TYPE 2 DIABETES MELLITUS WITHOUT COMPLICATION, WITH LONG-TERM CURRENT USE OF INSULIN (HCC): ICD-10-CM

## 2022-04-29 DIAGNOSIS — I82.402 RECURRENT ACUTE DEEP VEIN THROMBOSIS (DVT) OF LEFT LOWER EXTREMITY (HCC): ICD-10-CM

## 2022-04-29 DIAGNOSIS — Z79.4 TYPE 2 DIABETES MELLITUS WITHOUT COMPLICATION, WITH LONG-TERM CURRENT USE OF INSULIN (HCC): ICD-10-CM

## 2022-04-29 DIAGNOSIS — I10 ESSENTIAL HYPERTENSION: ICD-10-CM

## 2022-04-29 DIAGNOSIS — Z01.818 PRE-OP EXAM: Primary | ICD-10-CM

## 2022-04-29 PROCEDURE — 93000 ELECTROCARDIOGRAM COMPLETE: CPT | Performed by: PHYSICIAN ASSISTANT

## 2022-04-29 PROCEDURE — 99214 OFFICE O/P EST MOD 30 MIN: CPT | Performed by: PHYSICIAN ASSISTANT

## 2022-04-29 PROCEDURE — 2022F DILAT RTA XM EVC RTNOPTHY: CPT | Performed by: PHYSICIAN ASSISTANT

## 2022-04-29 PROCEDURE — 3044F HG A1C LEVEL LT 7.0%: CPT | Performed by: PHYSICIAN ASSISTANT

## 2022-04-29 PROCEDURE — 4004F PT TOBACCO SCREEN RCVD TLK: CPT | Performed by: PHYSICIAN ASSISTANT

## 2022-04-29 PROCEDURE — G8417 CALC BMI ABV UP PARAM F/U: HCPCS | Performed by: PHYSICIAN ASSISTANT

## 2022-04-29 PROCEDURE — G8427 DOCREV CUR MEDS BY ELIG CLIN: HCPCS | Performed by: PHYSICIAN ASSISTANT

## 2022-04-29 RX ORDER — LOPERAMIDE HYDROCHLORIDE 2 MG/1
CAPSULE ORAL
Qty: 120 CAPSULE | Refills: 0 | Status: SHIPPED
Start: 2022-04-29 | End: 2022-06-02 | Stop reason: SDUPTHER

## 2022-04-29 RX ORDER — FLUTICASONE PROPIONATE 50 MCG
1 SPRAY, SUSPENSION (ML) NASAL DAILY
Qty: 32 G | Refills: 1 | Status: SHIPPED
Start: 2022-04-29 | End: 2022-05-11

## 2022-04-29 ASSESSMENT — PATIENT HEALTH QUESTIONNAIRE - PHQ9
1. LITTLE INTEREST OR PLEASURE IN DOING THINGS: 0
SUM OF ALL RESPONSES TO PHQ QUESTIONS 1-9: 0
SUM OF ALL RESPONSES TO PHQ9 QUESTIONS 1 & 2: 0
2. FEELING DOWN, DEPRESSED OR HOPELESS: 0
SUM OF ALL RESPONSES TO PHQ QUESTIONS 1-9: 0

## 2022-04-29 NOTE — PROGRESS NOTES
Chief Complaint   Patient presents with    Pre-op Exam     hernia surgery        HPI:  Patient is here for preoperative clearance. Pre-surgical evaluation:  Patient is a 48year old male here by request of Dr. Tatianna Cabral who has upcoming incisional hernia repair surgery scheduled with patient. Patient will be undergoing general anaesthesia. He has no complaints or concerns today. He is not generally healthy. Chronic medical problems include HTN, IDDM, recurrent DVT. These are generally controlled and stable at this time. /88 today. Most recent labs reviewed with patient and  are not remarkable. Patient does not smoke. Patient does not drink alcohol. Patient  does not use drugs. Overall doing well. Patient does not have chest pain, palpitations, shortness of breath, or orthopnea. No known heart, kidney or liver issue to date to the best of patient's knowledge, my knowledge, or of that recorded in patient's current medical record. Patient has had cardiac testing in the past including EKG, stress test, and/or catheterization. If available, these records have been reviewed today. Patient does not have GRECIA.       Past Medical History:   Diagnosis Date    Accident 11/2019    stepped on nail rt ft about 1 month ago- healed per patient    Acute thrombosis of inferior vena cava ([address] 10/10/2020    SIMÓN (acute kidney injury) ([address] 2008 apx    kidney bruised due to fall / Everardo Maria L    Allergic rhinitis     Blister of left leg 8/31/2021    Cellulitis of leg, left 8/31/2021    Chronic back pain     Depression     Dermatophytosis 8/31/2021    Diabetes mellitus ([address]     Difficulty sleeping     at times    Displacement of lumbar intervertebral disc without myelopathy     Fibromyalgia     Fractured rib     2008 / healed    H/O seasonal allergies     Head injury 1980'S apx    no residual s/s    History of deep vein thrombosis 8/31/2021    Hyperlipidemia     Hypertension     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    Lymphedema of left leg 8/31/2021    Obesity (BMI 35.0-39.9 without comorbidity)     bmi 39.2  weight 296 #    Osteoarthritis     Recurrent acute deep vein thrombosis (DVT) of left lower extremity ([address] 8/31/2021    S/P IVC filter 8/31/2021    Subtherapeutic international normalized ratio (INR) 8/31/2021    Thoracic or lumbosacral neuritis or radiculitis, unspecified         Past Surgical History:   Procedure Laterality Date    COLONOSCOPY N/A 9/5/2020    COLONOSCOPY WITH BIOPSY performed by Renee Oconnell MD at [address] [facility] NEW ACCESS  8/3/2020    CT [facility] NEW ACCESS 8/3/2020 [facility]    FOOT SURGERY Right 1985    to treat shattered bones    HERNIA REPAIR  2001    DOUBLE HERNIA    HERNIA REPAIR Right 12/9/2019    LAPAROSCOPIC RIGHT INGUINAL HERNIA REPAIR, MESH 10x15 cm PLACEMENT performed by Julienne Dugan MD at [facility] Left 4/11/2016    ILIAC ARTERY STENT INSERTION N/A 10/11/2020    S/P ILIAC STENT PLACEMENT VISUALIZATION performed by Adrianne Rain MD at [facility] N/A 9/18/2020    LAPAROTOMY EXPLORATORY, CHOLECYSTECTOMY, BOWEL RESECTION, right darian colectomy, partial omentectomy, and splenectomy performed by Renee Oconnell MD at [address] [facility] PFR[facility] N/A 5/7/2018    COLONOSCOPY DIAGNOSTIC performed by Joseph Rosales MD at [facility] Left 2014    Dr. Carcamo Halo ARTHROSCOPY Left 11 21 14    SHOULDER SURGERY  2001 &2007    RIGHT AND LEFT repair of tears    THROMBECTOMY / EMBOLECTOMY FEMORAL Left 1/1/2021    LEFT LOWER EXTREMITY, VENOGRAM, FOLLOW UP POSSIBLE REMOVAL LYSIS CATHETER performed by Adrianne Rain MD at [facility] / EMBOLECTOMY FEMORAL Left 1/2/2021    LEFT LOWER EXTREMITY VENOGRAM performed by Adrianne Rain MD at [facility] Right 10/31/2016    Total right hip  Edwar Brown MD    UPPER GASTROINTESTINAL ENDOSCOPY N/A 9/4/2020    EGD DIAGNOSTIC ONLY performed by Charanjit Woodward MD at [address] Left 1/3/2021    LEFT LOWER EXTREMITY VENOGRAM, PLACEMENT OF NEW LYSIS CATHETER performed by Shawna Cardoza MD at [place] [address]  2007    LEFT WRIST           Social Connections:     Frequency of Communication with Friends and Family: Not on file    Frequency of Social Gatherings with Friends and Family: Not on file    Attends Jainism Services: Not on file    Active Member of Clubs or Organizations: Not on file    Attends Club or Organization Meetings: Not on file    Marital Status: Not on file        Family History   Problem Relation Age of Onset    Hypertension Mother     Arthritis Father     Diabetes Father     Cancer Maternal Grandfather         Skin    Cancer Paternal Uncle         skin    Diabetes Paternal Grandfather     Heart Disease Maternal Grandmother     Stroke Maternal Aunt          Current Outpatient Medications:     loperamide (IMODIUM) 2 MG capsule, take 1 capsule by mouth four times a day if needed for diarrhea, Disp: 120 capsule, Rfl: 0    fluticasone (FLONASE) 50 MCG/ACT nasal spray, 1 spray by Each Nostril route daily, Disp: 32 g, Rfl: 1    warfarin (COUMADIN) 2.5 MG tablet, take as directed, Disp: 90 tablet, Rfl: 1    warfarin (COUMADIN) 5 MG tablet, take 1 tablet by mouth every evening WITH WARFARIN 2.5 MG FOR A TOTAL DOSE OF 7.5 MG DAILY, Disp: 30 tablet, Rfl: 1    ibuprofen (ADVIL;MOTRIN) 200 MG tablet, Take 2 tablets by mouth every 6 hours as needed for Pain, Disp: 120 tablet, Rfl: 1    insulin glargine (LANTUS) 100 UNIT/ML injection vial, Inject 25 Units into the skin nightly, Disp: 10 mL, Rfl: 5    Insulin Syringe-Needle U-100 30G X 5/16\" 1 ML MISC, 1 each by Does not apply route daily Use as directed, Disp: 100 each, Rfl: 5   Appearance: He is well-developed. He is obese. HENT:      Head: Normocephalic and atraumatic. Right Ear: External ear normal.      Left Ear: External ear normal.      Nose: Nose normal.   Eyes:      General: No scleral icterus. Conjunctiva/sclera: Conjunctivae normal.      Pupils: Pupils are equal, round, and reactive to light. Neck:      Thyroid: No thyromegaly. Cardiovascular:      Rate and Rhythm: Normal rate and regular rhythm. Heart sounds: Normal heart sounds. No murmur heard. Pulmonary:      Effort: Pulmonary effort is normal. No accessory muscle usage or respiratory distress. Breath sounds: Normal breath sounds. No wheezing. Abdominal:      General: Bowel sounds are normal. There is no distension. Palpations: Abdomen is soft. Tenderness: There is no abdominal tenderness. Hernia: A hernia (incisional) is present. Musculoskeletal:         General: Normal range of motion. Cervical back: Normal range of motion and neck supple. Skin:     General: Skin is warm and dry. Findings: No rash. Neurological:      Mental Status: He is alert and oriented to person, place, and time. Deep Tendon Reflexes: Reflexes are normal and symmetric. Comments: Diabetic foot exam: No lesions seen.  Pulses: intact Monofilament: intact except for distal great toes   Psychiatric:         Speech: Speech normal.         Behavior: Behavior normal.         Assessment/Plan:      Cena Sacks was seen today for pre-op exam.    Diagnoses and all orders for this visit:    Pre-op exam  -     EKG 12 Lead  Pre-Operative Risk assessment using 2014 ACC/AHA guidelines     Emergent procedure No  Active Cardiac Condition No (decompensated HF, Arrhythmia, MI <3 weeks, severe valve disease)  Risk Level of Procedure Intermediate Risk (intraperitoneal, intrathoracic, HENT, orthopedic, or carotid endarterectomy, etc.)  Revised Cardiac Risk Index Risk factors: Diabetic treated with insulin  Measurement of Exercise Tolerance before Surgery >4 No    According to the 2014 ACC/AHA pre-operative risk assessment guidelines Ceci Caicedo II is a low risk for major cardiac complications during a intermediate risk procedure and may continue as planned. Specific medication recommendations are listed below. Medications recommended to continue should be taken with a sip of water even when NPO. Further recommendations from consultants: None    -will discuss with Dr Marco Antonio Akins       Incisional hernia, without obstruction or gangrene    Essential hypertension  -stable, continue current meds    Type 2 diabetes mellitus without complication, with long-term current use of insulin ([address]  -      DIABETES FOOT EXAM  -a1c is 6%    Recurrent acute deep vein thrombosis (DVT) of left lower extremity (HCC)  - hold coumadin 5 days pre-op    Chronic diarrhea  -     loperamide (IMODIUM) 2 MG capsule; take 1 capsule by mouth four times a day if needed for diarrhea    Seasonal allergies  -     fluticasone (FLONASE) 50 MCG/ACT nasal spray; 1 spray by Each Nostril route daily            As above. Call or go to ED immediately if symptoms worsen or persist.  Return in about 24 days (around 5/23/2022). , or sooner if necessary. Educational materials and/or home exercises printed for patient's review and were included in patient instructions on his/her After Visit Summary and given to patient at the end of visit. Counseled regarding above diagnosis, including possible risks and complications,  especially if left uncontrolled. Counseled regarding the possible side effects, risks, benefits and alternatives to treatment; patient and/or guardian verbalizes understanding, agrees, feels comfortable with and wishes to proceed with above treatment plan.     Advised patient to call with any new medication issues, and read all Rx info from pharmacy to assure aware of all possible risks and side effects of medication before taking. Reviewed age and gender appropriate health screening exams and vaccinations. Advised patient regarding importance of keeping up with recommended health maintenance and to schedule as soon as possible if overdue, as this is important in assessing for undiagnosed pathology, especially cancer, as well as protecting against potentially harmful/life threatening disease. Patient and/or guardian verbalizes understanding and agrees with above counseling, assessment and plan. All questions answered. Arpan Ashraf PA-C  4/29/2022    I have personally reviewed and updated the chief complaint, HPI, Past Medical, Family and Social History, as well as the above Review of Systems.

## 2022-05-02 ENCOUNTER — TELEPHONE (OUTPATIENT)
Dept: PRIMARY CARE CLINIC | Age: 47
End: 2022-05-02

## 2022-05-02 ENCOUNTER — TELEPHONE (OUTPATIENT)
Dept: SURGERY | Age: 47
End: 2022-05-02

## 2022-05-02 NOTE — TELEPHONE ENCOUNTER
April from Dr. Domitila Berman office phoned stating she read your note for his medical clearance and it states you will speak to Dr. Brittany Keller regarding him. She is tentatively schdueling him for 5-13 or 5-17. Have to call her and tell her when you return if he is OK for surgery.

## 2022-05-02 NOTE — TELEPHONE ENCOUNTER
Pt called stating went to office for hernia clearance. Advised will have care team review and discuss treatment plan. Will have Dr. Jovanna gunderson MA, April review and contact pt about plan.   Electronically signed by Tiffani Dominguez MA on 5/2/22 at 2:39 PM EDT

## 2022-05-05 ENCOUNTER — TELEPHONE (OUTPATIENT)
Dept: SURGERY | Age: 47
End: 2022-05-05

## 2022-05-05 NOTE — TELEPHONE ENCOUNTER
Pt called back about VM from MA April this morning. Instructed pt for open incisional hernia repair with mesh on 5/12/22 at 2pm in [facility]. Arrival time will be 12PM  NPO after the midnight the night before the procedure. No coumadin for 5 days before procedure. Instruction letter mailed. Encouraged to call our office if any questions. Pt verbalized understanding and agreed with plan.   Electronically signed by Lizandro Perez MA on 5/5/22 at 10:37 AM EDT

## 2022-05-05 NOTE — TELEPHONE ENCOUNTER
Scheduled patient for open incisional hernia repair with mesh on 5/12/22 at 2pm in [facility]. Patient needs to report at the front entrance 2 hours before the procedure, NPO after the midnight the night before the procedure. No coumadin for 5 days. Attempted to call the patient. No answer, left message on the phone. Instruction letter mailed. Encouraged to call our office if any questions.   Electronically signed by Meir Adhikari on 5/5/2022 at 8:43 AM

## 2022-05-05 NOTE — TELEPHONE ENCOUNTER
Prior Authorization Form:      DEMOGRAPHICS:                     Patient Name:  Lilliana Ochoa II  Patient :  1975            Insurance:  Payor: Colton Gomez / Plan: [employer] / Product Type: *No Product type* /   Insurance ID Number:    Payor/Plan Subscr  Sex Relation Sub. Ins. ID Effective Group Num   1.  MALGORZATA Barron * 1975 Male Self [id number] 21 [id number]                                   [address]         DIAGNOSIS & PROCEDURE:                       Procedure/Operation: open incisional hernia repair with mesh            CPT Code: 67107    Diagnosis:  Incisional hernia     ICD10 Code: K43.2    Location:  [place]     Surgeon:  Dr. Edd Mays INFORMATION:                          Date: 22    Time: 2pm              Anesthesia:  General                                                       Status:  Outpatient        Special Comments:  N/A       Electronically signed by Ashely Arellano on 2022 at 8:41 AM

## 2022-05-06 ENCOUNTER — PREP FOR PROCEDURE (OUTPATIENT)
Dept: SURGERY | Age: 47
End: 2022-05-06

## 2022-05-06 RX ORDER — SODIUM CHLORIDE 0.9 % (FLUSH) 0.9 %
5-40 SYRINGE (ML) INJECTION EVERY 12 HOURS SCHEDULED
Status: CANCELLED | OUTPATIENT
Start: 2022-05-06

## 2022-05-06 RX ORDER — SODIUM CHLORIDE 9 MG/ML
INJECTION, SOLUTION INTRAVENOUS PRN
Status: CANCELLED | OUTPATIENT
Start: 2022-05-06

## 2022-05-06 RX ORDER — ENOXAPARIN SODIUM 100 MG/ML
40 INJECTION SUBCUTANEOUS ONCE
Status: CANCELLED | OUTPATIENT
Start: 2022-05-06 | End: 2022-05-06

## 2022-05-06 RX ORDER — SODIUM CHLORIDE 0.9 % (FLUSH) 0.9 %
5-40 SYRINGE (ML) INJECTION PRN
Status: CANCELLED | OUTPATIENT
Start: 2022-05-06

## 2022-05-11 NOTE — PROGRESS NOTES
[address]   PRE-ADMISSION TESTING GENERAL INSTRUCTIONS  [facility] Phone Number: [phone]      GENERAL INSTRUCTIONS:    [x] Antibacterial Soap Shower Night before and/or AM of Surgery   [x] Do not wear lotions, powders, deodorant. [x] Nothing by mouth after midnight, including gum, candy, mints, or water. [x] You may brush your teeth, gargle, but do NOT swallow water. [x] No tobacco products, illegal drugs, or alcohol within 24 hours of your surgery. [x] Jewelry or valuables should not be brought to the hospital. All body and/or tongue piercing's must be removed prior to arriving to hospital. ALL hair pins must be removed. [x] Arrange transportation with a responsible adult  to and from the hospital. Arrange for someone to be with you for the remainder of the day and for 24 hours after your procedure due to having had anesthesia. -Who will be your  for transportation?__________________         -Who will be staying with you for 24 hrs after your procedure?__________________  [x] Bring insurance card and photo ID.  [] Bring copy of living will or healthcare power of  papers to be placed in your electronic record. [] Transfusion Bracelet: Please bring with you to hospital, day of surgery  [] CPAP/BI-PAP: Please bring your machine if you are to spend the night in the hospital.     PARKING INSTRUCTIONS:     [x] ARRIVAL TIME: 5/12/22 @ 12:00pm  · [x] Enter into the [employer]. Two people may accompany you. Masks are required. · [x] Parking Lot \"I\" is where you will park. It is located on the corner of [address] and [address]. The entrance is on [address]. · To enter, press the button and the gate will lift. A free token will be provided to exit the lot. EDUCATION INSTRUCTIONS:        [] Knee or hip replacement booklet & exercise pamphlets given. [] [address] placed in chart.    [] Pre-admission Testing educational folder given  [] Incentive Spirometry,coughing & deep breathing exercises reviewed. [] Medication information sheet(s)   [] Fluoroscopy-Xray used in surgery reviewed with patient. Educational pamphlet placed in chart. [x] Pain: Post-op pain is normal and to be expected. You will be asked to rate your pain from 0-10. [x] Ask your nurse for your pain medication. [] Joint camp offered. [] Joint replacement booklets given.  [] Spine Navigator to see in PAT. [] Other:___________________________    MEDICATION INSTRUCTIONS:    [x] Bring a complete list of your medications, please write the last time you took the medicine, give this list to the nurse. [x] Take the following medications the morning of surgery with 1-2 ounces of water: None. (Pt takes all medications at night)  [x] Stop herbal supplements and vitamins 5 days before your surgery. [x] DO NOT take any diabetic medicine the morning of surgery. Follow instructions for insulin the day before surgery. *Instructed to take only HALF his dose of Lantus the night before surgery. (12.5units only tonight)   [x] If you are diabetic and your blood sugar is low or you feel symptomatic, you may drink 1-2 ounces of apple juice or take a glucose tablet.            -The morning of your procedure, you may call the pre-op area if you have concerns about your blood sugar [phone]. [x] Use your inhalers the morning of surgery. Bring your emergency inhaler with you day of surgery. [x] Follow physician instructions regarding any blood thinners you may be taking. LD Coumadin 5/6/22)    WHAT TO EXPECT:    [x] The day of surgery you will be greeted and checked in by the [employer].  In addition, you will be registered in the [facility] by a Patient Access Representative. Please bring your photo ID and insurance card.  A nurse will greet you in accordance to the time you are needed in the pre-op area to prepare you for surgery. Please do not be discouraged if you are not greeted in the order you arrive as there are many variables that are involved in patient preparation. Your patience is greatly appreciated as you wait for your nurse. Please bring in items such as: books, magazines, newspapers, electronics, or any other items  to occupy your time in the waiting area. [x]  Delays may occur with surgery and staff will make a sincere effort to keep you informed of delays. If any delays occur with your procedure, we apologize ahead of time for your inconvenience as we recognize the value of your time.

## 2022-05-12 ENCOUNTER — ANESTHESIA EVENT (OUTPATIENT)
Dept: OPERATING ROOM | Age: 47
End: 2022-05-12
Payer: MEDICARE

## 2022-05-12 ENCOUNTER — HOSPITAL ENCOUNTER (OUTPATIENT)
Age: 47
Setting detail: OBSERVATION
Discharge: HOME | End: 2022-05-15
Attending: SURGERY | Admitting: SURGERY
Payer: MEDICARE

## 2022-05-12 ENCOUNTER — ANESTHESIA (OUTPATIENT)
Dept: OPERATING ROOM | Age: 47
End: 2022-05-12
Payer: MEDICARE

## 2022-05-12 VITALS — DIASTOLIC BLOOD PRESSURE: 84 MMHG | TEMPERATURE: 97.2 F | SYSTOLIC BLOOD PRESSURE: 91 MMHG | OXYGEN SATURATION: 89 %

## 2022-05-12 DIAGNOSIS — K43.2 INCISIONAL HERNIA OF ANTERIOR ABDOMINAL WALL WITHOUT OBSTRUCTION OR GANGRENE: Primary | ICD-10-CM

## 2022-05-12 DIAGNOSIS — Z01.812 PRE-OPERATIVE LABORATORY EXAMINATION: ICD-10-CM

## 2022-05-12 PROBLEM — K43.9 HERNIA OF ABDOMINAL WALL: Status: ACTIVE | Noted: 2022-05-12

## 2022-05-12 LAB
ANION GAP SERPL CALCULATED.3IONS-SCNC: 13 MMOL/L (ref 7–16)
BASOPHILS ABSOLUTE: 0.12 E9/L (ref 0–0.2)
BASOPHILS RELATIVE PERCENT: 1.1 % (ref 0–2)
BUN BLDV-MCNC: 15 MG/DL (ref 6–20)
CALCIUM SERPL-MCNC: 9.5 MG/DL (ref 8.6–10.2)
CHLORIDE BLD-SCNC: 106 MMOL/L (ref 98–107)
CO2: 23 MMOL/L (ref 22–29)
CREAT SERPL-MCNC: 0.8 MG/DL (ref 0.7–1.2)
EOSINOPHILS ABSOLUTE: 0.34 E9/L (ref 0.05–0.5)
EOSINOPHILS RELATIVE PERCENT: 3 % (ref 0–6)
GFR AFRICAN AMERICAN: >60
GFR NON-AFRICAN AMERICAN: >60 ML/MIN/1.73
GLUCOSE BLD-MCNC: 114 MG/DL (ref 74–99)
HCT VFR BLD CALC: 46.2 % (ref 37–54)
HEMOGLOBIN: 15.7 G/DL (ref 12.5–16.5)
IMMATURE GRANULOCYTES #: 0.02 E9/L
IMMATURE GRANULOCYTES %: 0.2 % (ref 0–5)
INR BLD: 0.9
LYMPHOCYTES ABSOLUTE: 2.41 E9/L (ref 1.5–4)
LYMPHOCYTES RELATIVE PERCENT: 21.1 % (ref 20–42)
MCH RBC QN AUTO: 30.5 PG (ref 26–35)
MCHC RBC AUTO-ENTMCNC: 34 % (ref 32–34.5)
MCV RBC AUTO: 89.7 FL (ref 80–99.9)
METER GLUCOSE: 129 MG/DL (ref 74–99)
METER GLUCOSE: 158 MG/DL (ref 74–99)
MONOCYTES ABSOLUTE: 1.11 E9/L (ref 0.1–0.95)
MONOCYTES RELATIVE PERCENT: 9.7 % (ref 2–12)
NEUTROPHILS ABSOLUTE: 7.42 E9/L (ref 1.8–7.3)
NEUTROPHILS RELATIVE PERCENT: 64.9 % (ref 43–80)
PDW BLD-RTO: 14.8 FL (ref 11.5–15)
PLATELET # BLD: 527 E9/L (ref 130–450)
PMV BLD AUTO: 9.4 FL (ref 7–12)
POTASSIUM REFLEX MAGNESIUM: 4.3 MMOL/L (ref 3.5–5)
PROTHROMBIN TIME: 10.2 SEC (ref 9.3–12.4)
RBC # BLD: 5.15 E12/L (ref 3.8–5.8)
SODIUM BLD-SCNC: 142 MMOL/L (ref 132–146)
WBC # BLD: 11.4 E9/L (ref 4.5–11.5)

## 2022-05-12 PROCEDURE — 6370000000 HC RX 637 (ALT 250 FOR IP): Performed by: STUDENT IN AN ORGANIZED HEALTH CARE EDUCATION/TRAINING PROGRAM

## 2022-05-12 PROCEDURE — 7100000000 HC PACU RECOVERY - FIRST 15 MIN: Performed by: SURGERY

## 2022-05-12 PROCEDURE — 2580000003 HC RX 258: Performed by: NURSE ANESTHETIST, CERTIFIED REGISTERED

## 2022-05-12 PROCEDURE — 6360000002 HC RX W HCPCS: Performed by: ANESTHESIOLOGY

## 2022-05-12 PROCEDURE — S5553 INSULIN LONG ACTING 5 U: HCPCS | Performed by: STUDENT IN AN ORGANIZED HEALTH CARE EDUCATION/TRAINING PROGRAM

## 2022-05-12 PROCEDURE — 36415 COLL VENOUS BLD VENIPUNCTURE: CPT

## 2022-05-12 PROCEDURE — 6360000002 HC RX W HCPCS

## 2022-05-12 PROCEDURE — 88302 TISSUE EXAM BY PATHOLOGIST: CPT

## 2022-05-12 PROCEDURE — 3600000013 HC SURGERY LEVEL 3 ADDTL 15MIN: Performed by: SURGERY

## 2022-05-12 PROCEDURE — G0378 HOSPITAL OBSERVATION PER HR: HCPCS

## 2022-05-12 PROCEDURE — 6360000002 HC RX W HCPCS: Performed by: SURGERY

## 2022-05-12 PROCEDURE — C1781 MESH (IMPLANTABLE): HCPCS | Performed by: SURGERY

## 2022-05-12 PROCEDURE — 2580000003 HC RX 258: Performed by: SURGERY

## 2022-05-12 PROCEDURE — 85025 COMPLETE CBC W/AUTO DIFF WBC: CPT

## 2022-05-12 PROCEDURE — 85610 PROTHROMBIN TIME: CPT

## 2022-05-12 PROCEDURE — 2500000003 HC RX 250 WO HCPCS: Performed by: ANESTHESIOLOGY

## 2022-05-12 PROCEDURE — 82962 GLUCOSE BLOOD TEST: CPT

## 2022-05-12 PROCEDURE — 49560 PR REPAIR INCISIONAL HERNIA,REDUCIBLE: CPT | Performed by: SURGERY

## 2022-05-12 PROCEDURE — 2780000010 HC IMPLANT OTHER: Performed by: SURGERY

## 2022-05-12 PROCEDURE — 2580000003 HC RX 258: Performed by: STUDENT IN AN ORGANIZED HEALTH CARE EDUCATION/TRAINING PROGRAM

## 2022-05-12 PROCEDURE — 6360000002 HC RX W HCPCS: Performed by: NURSE ANESTHETIST, CERTIFIED REGISTERED

## 2022-05-12 PROCEDURE — 3700000000 HC ANESTHESIA ATTENDED CARE: Performed by: SURGERY

## 2022-05-12 PROCEDURE — 6360000002 HC RX W HCPCS: Performed by: STUDENT IN AN ORGANIZED HEALTH CARE EDUCATION/TRAINING PROGRAM

## 2022-05-12 PROCEDURE — 7100000001 HC PACU RECOVERY - ADDTL 15 MIN: Performed by: SURGERY

## 2022-05-12 PROCEDURE — 49568 PR IMPLANT MESH HERNIA REPAIR/DEBRIDEMENT CLOSURE: CPT | Performed by: SURGERY

## 2022-05-12 PROCEDURE — 2709999900 HC NON-CHARGEABLE SUPPLY: Performed by: SURGERY

## 2022-05-12 PROCEDURE — 2500000003 HC RX 250 WO HCPCS: Performed by: SURGERY

## 2022-05-12 PROCEDURE — 3600000003 HC SURGERY LEVEL 3 BASE: Performed by: SURGERY

## 2022-05-12 PROCEDURE — 2500000003 HC RX 250 WO HCPCS: Performed by: NURSE ANESTHETIST, CERTIFIED REGISTERED

## 2022-05-12 PROCEDURE — 3700000001 HC ADD 15 MINUTES (ANESTHESIA): Performed by: SURGERY

## 2022-05-12 PROCEDURE — 80048 BASIC METABOLIC PNL TOTAL CA: CPT

## 2022-05-12 DEVICE — MESH HERN W10XL14IN INGUINAL POLYPR MFIL RECTANG: Type: IMPLANTABLE DEVICE | Site: ABDOMEN | Status: FUNCTIONAL

## 2022-05-12 RX ORDER — SODIUM CHLORIDE 0.9 % (FLUSH) 0.9 %
5-40 SYRINGE (ML) INJECTION EVERY 12 HOURS SCHEDULED
Status: DISCONTINUED | OUTPATIENT
Start: 2022-05-12 | End: 2022-05-12 | Stop reason: HOSPADM

## 2022-05-12 RX ORDER — VECURONIUM BROMIDE 1 MG/ML
INJECTION, POWDER, LYOPHILIZED, FOR SOLUTION INTRAVENOUS PRN
Status: DISCONTINUED | OUTPATIENT
Start: 2022-05-12 | End: 2022-05-12 | Stop reason: SDUPTHER

## 2022-05-12 RX ORDER — SUFENTANIL CITRATE 50 UG/ML
INJECTION EPIDURAL; INTRAVENOUS PRN
Status: DISCONTINUED | OUTPATIENT
Start: 2022-05-12 | End: 2022-05-12 | Stop reason: SDUPTHER

## 2022-05-12 RX ORDER — ENOXAPARIN SODIUM 100 MG/ML
40 INJECTION SUBCUTANEOUS DAILY
Status: DISCONTINUED | OUTPATIENT
Start: 2022-05-13 | End: 2022-05-15 | Stop reason: HOSPADM

## 2022-05-12 RX ORDER — GABAPENTIN 100 MG/1
100 CAPSULE ORAL EVERY 8 HOURS
Status: DISCONTINUED | OUTPATIENT
Start: 2022-05-12 | End: 2022-05-15 | Stop reason: HOSPADM

## 2022-05-12 RX ORDER — SODIUM CHLORIDE 0.9 % (FLUSH) 0.9 %
5-40 SYRINGE (ML) INJECTION PRN
Status: DISCONTINUED | OUTPATIENT
Start: 2022-05-12 | End: 2022-05-12 | Stop reason: HOSPADM

## 2022-05-12 RX ORDER — ONDANSETRON 4 MG/1
4 TABLET, ORALLY DISINTEGRATING ORAL EVERY 8 HOURS PRN
Status: DISCONTINUED | OUTPATIENT
Start: 2022-05-12 | End: 2022-05-15 | Stop reason: HOSPADM

## 2022-05-12 RX ORDER — DIPHENHYDRAMINE HYDROCHLORIDE 50 MG/ML
12.5 INJECTION INTRAMUSCULAR; INTRAVENOUS
Status: DISCONTINUED | OUTPATIENT
Start: 2022-05-12 | End: 2022-05-12 | Stop reason: HOSPADM

## 2022-05-12 RX ORDER — INSULIN GLARGINE-YFGN 100 [IU]/ML
25 INJECTION, SOLUTION SUBCUTANEOUS NIGHTLY
Status: DISCONTINUED | OUTPATIENT
Start: 2022-05-12 | End: 2022-05-15 | Stop reason: HOSPADM

## 2022-05-12 RX ORDER — DEXAMETHASONE SODIUM PHOSPHATE 10 MG/ML
INJECTION INTRAMUSCULAR; INTRAVENOUS PRN
Status: DISCONTINUED | OUTPATIENT
Start: 2022-05-12 | End: 2022-05-12 | Stop reason: SDUPTHER

## 2022-05-12 RX ORDER — DEXTROSE MONOHYDRATE 50 MG/ML
100 INJECTION, SOLUTION INTRAVENOUS PRN
Status: DISCONTINUED | OUTPATIENT
Start: 2022-05-12 | End: 2022-05-15 | Stop reason: HOSPADM

## 2022-05-12 RX ORDER — ASPIRIN 81 MG/1
81 TABLET, CHEWABLE ORAL DAILY
Status: DISCONTINUED | OUTPATIENT
Start: 2022-05-13 | End: 2022-05-15 | Stop reason: HOSPADM

## 2022-05-12 RX ORDER — METOPROLOL SUCCINATE 25 MG/1
25 TABLET, EXTENDED RELEASE ORAL DAILY
Status: DISCONTINUED | OUTPATIENT
Start: 2022-05-13 | End: 2022-05-15 | Stop reason: HOSPADM

## 2022-05-12 RX ORDER — LABETALOL HYDROCHLORIDE 5 MG/ML
5 INJECTION, SOLUTION INTRAVENOUS
Status: DISCONTINUED | OUTPATIENT
Start: 2022-05-12 | End: 2022-05-12 | Stop reason: HOSPADM

## 2022-05-12 RX ORDER — SUCCINYLCHOLINE/SOD CL,ISO/PF 200MG/10ML
SYRINGE (ML) INTRAVENOUS PRN
Status: DISCONTINUED | OUTPATIENT
Start: 2022-05-12 | End: 2022-05-12 | Stop reason: SDUPTHER

## 2022-05-12 RX ORDER — ACETAMINOPHEN 325 MG/1
650 TABLET ORAL
Status: DISCONTINUED | OUTPATIENT
Start: 2022-05-12 | End: 2022-05-12 | Stop reason: HOSPADM

## 2022-05-12 RX ORDER — METHOCARBAMOL 750 MG/1
1500 TABLET, FILM COATED ORAL 4 TIMES DAILY
Status: DISCONTINUED | OUTPATIENT
Start: 2022-05-12 | End: 2022-05-15 | Stop reason: HOSPADM

## 2022-05-12 RX ORDER — SODIUM CHLORIDE 0.9 % (FLUSH) 0.9 %
10 SYRINGE (ML) INJECTION EVERY 12 HOURS SCHEDULED
Status: DISCONTINUED | OUTPATIENT
Start: 2022-05-12 | End: 2022-05-15 | Stop reason: HOSPADM

## 2022-05-12 RX ORDER — SODIUM CHLORIDE 9 MG/ML
INJECTION, SOLUTION INTRAVENOUS PRN
Status: DISCONTINUED | OUTPATIENT
Start: 2022-05-12 | End: 2022-05-12 | Stop reason: HOSPADM

## 2022-05-12 RX ORDER — HYDROMORPHONE HCL 110MG/55ML
PATIENT CONTROLLED ANALGESIA SYRINGE INTRAVENOUS PRN
Status: DISCONTINUED | OUTPATIENT
Start: 2022-05-12 | End: 2022-05-12 | Stop reason: SDUPTHER

## 2022-05-12 RX ORDER — ONDANSETRON 2 MG/ML
4 INJECTION INTRAMUSCULAR; INTRAVENOUS EVERY 6 HOURS PRN
Status: DISCONTINUED | OUTPATIENT
Start: 2022-05-12 | End: 2022-05-15 | Stop reason: HOSPADM

## 2022-05-12 RX ORDER — DEXTROSE MONOHYDRATE 25 G/50ML
12.5 INJECTION, SOLUTION INTRAVENOUS PRN
Status: DISCONTINUED | OUTPATIENT
Start: 2022-05-12 | End: 2022-05-15 | Stop reason: HOSPADM

## 2022-05-12 RX ORDER — VANCOMYCIN HYDROCHLORIDE 1 G/20ML
INJECTION, POWDER, LYOPHILIZED, FOR SOLUTION INTRAVENOUS PRN
Status: DISCONTINUED | OUTPATIENT
Start: 2022-05-12 | End: 2022-05-12 | Stop reason: ALTCHOICE

## 2022-05-12 RX ORDER — KETOROLAC TROMETHAMINE 30 MG/ML
INJECTION, SOLUTION INTRAMUSCULAR; INTRAVENOUS PRN
Status: DISCONTINUED | OUTPATIENT
Start: 2022-05-12 | End: 2022-05-12 | Stop reason: SDUPTHER

## 2022-05-12 RX ORDER — NEOSTIGMINE METHYLSULFATE 1 MG/ML
INJECTION, SOLUTION INTRAVENOUS PRN
Status: DISCONTINUED | OUTPATIENT
Start: 2022-05-12 | End: 2022-05-12 | Stop reason: SDUPTHER

## 2022-05-12 RX ORDER — ONDANSETRON 2 MG/ML
4 INJECTION INTRAMUSCULAR; INTRAVENOUS
Status: DISCONTINUED | OUTPATIENT
Start: 2022-05-12 | End: 2022-05-12 | Stop reason: HOSPADM

## 2022-05-12 RX ORDER — IPRATROPIUM BROMIDE AND ALBUTEROL SULFATE 2.5; .5 MG/3ML; MG/3ML
1 SOLUTION RESPIRATORY (INHALATION)
Status: DISCONTINUED | OUTPATIENT
Start: 2022-05-12 | End: 2022-05-12 | Stop reason: HOSPADM

## 2022-05-12 RX ORDER — SENNA AND DOCUSATE SODIUM 50; 8.6 MG/1; MG/1
1 TABLET, FILM COATED ORAL 2 TIMES DAILY
Status: DISCONTINUED | OUTPATIENT
Start: 2022-05-12 | End: 2022-05-15 | Stop reason: HOSPADM

## 2022-05-12 RX ORDER — MIDAZOLAM HYDROCHLORIDE 2 MG/2ML
2 INJECTION, SOLUTION INTRAMUSCULAR; INTRAVENOUS
Status: DISCONTINUED | OUTPATIENT
Start: 2022-05-12 | End: 2022-05-12 | Stop reason: HOSPADM

## 2022-05-12 RX ORDER — ACETAMINOPHEN 325 MG/1
650 TABLET ORAL EVERY 6 HOURS
Status: DISCONTINUED | OUTPATIENT
Start: 2022-05-12 | End: 2022-05-15 | Stop reason: HOSPADM

## 2022-05-12 RX ORDER — VASOPRESSIN 20 U/ML
INJECTION PARENTERAL PRN
Status: DISCONTINUED | OUTPATIENT
Start: 2022-05-12 | End: 2022-05-12 | Stop reason: SDUPTHER

## 2022-05-12 RX ORDER — MIDAZOLAM HYDROCHLORIDE 1 MG/ML
INJECTION INTRAMUSCULAR; INTRAVENOUS PRN
Status: DISCONTINUED | OUTPATIENT
Start: 2022-05-12 | End: 2022-05-12 | Stop reason: SDUPTHER

## 2022-05-12 RX ORDER — SODIUM CHLORIDE, SODIUM LACTATE, POTASSIUM CHLORIDE, CALCIUM CHLORIDE 600; 310; 30; 20 MG/100ML; MG/100ML; MG/100ML; MG/100ML
INJECTION, SOLUTION INTRAVENOUS CONTINUOUS
Status: DISCONTINUED | OUTPATIENT
Start: 2022-05-12 | End: 2022-05-13

## 2022-05-12 RX ORDER — ONDANSETRON 2 MG/ML
INJECTION INTRAMUSCULAR; INTRAVENOUS PRN
Status: DISCONTINUED | OUTPATIENT
Start: 2022-05-12 | End: 2022-05-12 | Stop reason: SDUPTHER

## 2022-05-12 RX ORDER — LIDOCAINE HYDROCHLORIDE 20 MG/ML
INJECTION, SOLUTION INTRAVENOUS PRN
Status: DISCONTINUED | OUTPATIENT
Start: 2022-05-12 | End: 2022-05-12 | Stop reason: SDUPTHER

## 2022-05-12 RX ORDER — OXYCODONE HYDROCHLORIDE 5 MG/1
5 TABLET ORAL EVERY 4 HOURS PRN
Status: DISCONTINUED | OUTPATIENT
Start: 2022-05-12 | End: 2022-05-15 | Stop reason: HOSPADM

## 2022-05-12 RX ORDER — KETAMINE HCL IN NACL, ISO-OSM 100MG/10ML
SYRINGE (ML) INJECTION PRN
Status: DISCONTINUED | OUTPATIENT
Start: 2022-05-12 | End: 2022-05-12 | Stop reason: SDUPTHER

## 2022-05-12 RX ORDER — SODIUM CHLORIDE 0.9 % (FLUSH) 0.9 %
10 SYRINGE (ML) INJECTION PRN
Status: DISCONTINUED | OUTPATIENT
Start: 2022-05-12 | End: 2022-05-15 | Stop reason: HOSPADM

## 2022-05-12 RX ORDER — BUPIVACAINE HYDROCHLORIDE 5 MG/ML
INJECTION, SOLUTION EPIDURAL; INTRACAUDAL PRN
Status: DISCONTINUED | OUTPATIENT
Start: 2022-05-12 | End: 2022-05-12 | Stop reason: ALTCHOICE

## 2022-05-12 RX ORDER — INSULIN LISPRO 100 [IU]/ML
0-12 INJECTION, SOLUTION INTRAVENOUS; SUBCUTANEOUS
Status: DISCONTINUED | OUTPATIENT
Start: 2022-05-12 | End: 2022-05-15 | Stop reason: HOSPADM

## 2022-05-12 RX ORDER — DROPERIDOL 2.5 MG/ML
0.62 INJECTION, SOLUTION INTRAMUSCULAR; INTRAVENOUS
Status: DISCONTINUED | OUTPATIENT
Start: 2022-05-12 | End: 2022-05-12 | Stop reason: HOSPADM

## 2022-05-12 RX ORDER — OXYCODONE HYDROCHLORIDE 10 MG/1
10 TABLET ORAL EVERY 4 HOURS PRN
Status: DISCONTINUED | OUTPATIENT
Start: 2022-05-12 | End: 2022-05-15 | Stop reason: HOSPADM

## 2022-05-12 RX ORDER — ENOXAPARIN SODIUM 100 MG/ML
40 INJECTION SUBCUTANEOUS ONCE
Status: DISCONTINUED | OUTPATIENT
Start: 2022-05-12 | End: 2022-05-12 | Stop reason: HOSPADM

## 2022-05-12 RX ORDER — PROPOFOL 10 MG/ML
INJECTION, EMULSION INTRAVENOUS PRN
Status: DISCONTINUED | OUTPATIENT
Start: 2022-05-12 | End: 2022-05-12 | Stop reason: SDUPTHER

## 2022-05-12 RX ORDER — SODIUM CHLORIDE 9 MG/ML
INJECTION, SOLUTION INTRAVENOUS CONTINUOUS PRN
Status: DISCONTINUED | OUTPATIENT
Start: 2022-05-12 | End: 2022-05-12 | Stop reason: SDUPTHER

## 2022-05-12 RX ORDER — GLYCOPYRROLATE 1 MG/5 ML
SYRINGE (ML) INTRAVENOUS PRN
Status: DISCONTINUED | OUTPATIENT
Start: 2022-05-12 | End: 2022-05-12 | Stop reason: SDUPTHER

## 2022-05-12 RX ORDER — POLYETHYLENE GLYCOL 3350 17 G/17G
17 POWDER, FOR SOLUTION ORAL DAILY
Status: DISCONTINUED | OUTPATIENT
Start: 2022-05-12 | End: 2022-05-15 | Stop reason: HOSPADM

## 2022-05-12 RX ORDER — HYDRALAZINE HYDROCHLORIDE 20 MG/ML
5 INJECTION INTRAMUSCULAR; INTRAVENOUS
Status: DISCONTINUED | OUTPATIENT
Start: 2022-05-12 | End: 2022-05-12 | Stop reason: HOSPADM

## 2022-05-12 RX ORDER — PHENYLEPHRINE HCL IN 0.9% NACL 1 MG/10 ML
SYRINGE (ML) INTRAVENOUS PRN
Status: DISCONTINUED | OUTPATIENT
Start: 2022-05-12 | End: 2022-05-12 | Stop reason: SDUPTHER

## 2022-05-12 RX ORDER — SODIUM CHLORIDE 9 MG/ML
25 INJECTION, SOLUTION INTRAVENOUS PRN
Status: DISCONTINUED | OUTPATIENT
Start: 2022-05-12 | End: 2022-05-12 | Stop reason: HOSPADM

## 2022-05-12 RX ORDER — SODIUM CHLORIDE 9 MG/ML
INJECTION, SOLUTION INTRAVENOUS PRN
Status: DISCONTINUED | OUTPATIENT
Start: 2022-05-12 | End: 2022-05-15 | Stop reason: HOSPADM

## 2022-05-12 RX ADMIN — SODIUM CHLORIDE: 9 INJECTION, SOLUTION INTRAVENOUS at 13:44

## 2022-05-12 RX ADMIN — VASOPRESSIN 1 UNITS: 20 INJECTION INTRAVENOUS at 14:37

## 2022-05-12 RX ADMIN — VECURONIUM BROMIDE 1 MG: 10 INJECTION, POWDER, LYOPHILIZED, FOR SOLUTION INTRAVENOUS at 13:51

## 2022-05-12 RX ADMIN — Medication 200 MCG: at 14:00

## 2022-05-12 RX ADMIN — HYDROMORPHONE HYDROCHLORIDE 0.5 MG: 1 INJECTION, SOLUTION INTRAMUSCULAR; INTRAVENOUS; SUBCUTANEOUS at 22:06

## 2022-05-12 RX ADMIN — Medication 100 MCG: at 14:02

## 2022-05-12 RX ADMIN — VASOPRESSIN 2 UNITS: 20 INJECTION INTRAVENOUS at 15:11

## 2022-05-12 RX ADMIN — ONDANSETRON HYDROCHLORIDE 4 MG: 2 SOLUTION INTRAMUSCULAR; INTRAVENOUS at 14:41

## 2022-05-12 RX ADMIN — MIDAZOLAM 2 MG: 1 INJECTION INTRAMUSCULAR; INTRAVENOUS at 13:44

## 2022-05-12 RX ADMIN — DEXAMETHASONE SODIUM PHOSPHATE 10 MG: 10 INJECTION INTRAMUSCULAR; INTRAVENOUS at 13:51

## 2022-05-12 RX ADMIN — VASOPRESSIN 2 UNITS: 20 INJECTION INTRAVENOUS at 14:12

## 2022-05-12 RX ADMIN — PROPOFOL 200 MG: 10 INJECTION, EMULSION INTRAVENOUS at 13:51

## 2022-05-12 RX ADMIN — HYDROMORPHONE HYDROCHLORIDE 0.5 MG: 1 INJECTION, SOLUTION INTRAMUSCULAR; INTRAVENOUS; SUBCUTANEOUS at 16:48

## 2022-05-12 RX ADMIN — VASOPRESSIN 1 UNITS: 20 INJECTION INTRAVENOUS at 14:21

## 2022-05-12 RX ADMIN — LIDOCAINE HYDROCHLORIDE 100 MG: 20 INJECTION, SOLUTION INTRAVENOUS at 13:51

## 2022-05-12 RX ADMIN — VECURONIUM BROMIDE 5 MG: 10 INJECTION, POWDER, LYOPHILIZED, FOR SOLUTION INTRAVENOUS at 14:05

## 2022-05-12 RX ADMIN — HYDROMORPHONE HYDROCHLORIDE 0.25 MG: 1 INJECTION, SOLUTION INTRAMUSCULAR; INTRAVENOUS; SUBCUTANEOUS at 17:51

## 2022-05-12 RX ADMIN — Medication 200 MCG: at 14:07

## 2022-05-12 RX ADMIN — SUFENTANIL CITRATE 20 MCG: 50 INJECTION EPIDURAL; INTRAVENOUS at 14:24

## 2022-05-12 RX ADMIN — HYDROMORPHONE HYDROCHLORIDE 1 MG: 2 INJECTION, SOLUTION INTRAMUSCULAR; INTRAVENOUS; SUBCUTANEOUS at 15:29

## 2022-05-12 RX ADMIN — SUFENTANIL CITRATE 30 MCG: 50 INJECTION EPIDURAL; INTRAVENOUS at 13:51

## 2022-05-12 RX ADMIN — OXYCODONE HYDROCHLORIDE 10 MG: 10 TABLET ORAL at 20:07

## 2022-05-12 RX ADMIN — Medication 1 MG: at 15:01

## 2022-05-12 RX ADMIN — HYDROMORPHONE HYDROCHLORIDE 0.25 MG: 1 INJECTION, SOLUTION INTRAMUSCULAR; INTRAVENOUS; SUBCUTANEOUS at 17:43

## 2022-05-12 RX ADMIN — Medication 30 MG: at 14:06

## 2022-05-12 RX ADMIN — HYDROMORPHONE HYDROCHLORIDE 1 MG: 2 INJECTION, SOLUTION INTRAMUSCULAR; INTRAVENOUS; SUBCUTANEOUS at 15:32

## 2022-05-12 RX ADMIN — Medication 0.6 MG: at 14:55

## 2022-05-12 RX ADMIN — SODIUM CHLORIDE: 9 INJECTION, SOLUTION INTRAVENOUS at 14:39

## 2022-05-12 RX ADMIN — KETOROLAC TROMETHAMINE 30 MG: 30 INJECTION, SOLUTION INTRAMUSCULAR at 14:56

## 2022-05-12 RX ADMIN — INSULIN GLARGINE-YFGN 25 UNITS: 100 INJECTION, SOLUTION SUBCUTANEOUS at 20:14

## 2022-05-12 RX ADMIN — LABETALOL HYDROCHLORIDE 5 MG: 5 INJECTION INTRAVENOUS at 15:57

## 2022-05-12 RX ADMIN — Medication 0.2 MG: at 15:01

## 2022-05-12 RX ADMIN — METHOCARBAMOL TABLETS 1500 MG: 750 TABLET, COATED ORAL at 20:07

## 2022-05-12 RX ADMIN — HYDROMORPHONE HYDROCHLORIDE 0.25 MG: 1 INJECTION, SOLUTION INTRAMUSCULAR; INTRAVENOUS; SUBCUTANEOUS at 17:56

## 2022-05-12 RX ADMIN — Medication 20 MG: at 15:29

## 2022-05-12 RX ADMIN — VASOPRESSIN 2 UNITS: 20 INJECTION INTRAVENOUS at 14:41

## 2022-05-12 RX ADMIN — HYDROMORPHONE HYDROCHLORIDE 0.5 MG: 1 INJECTION, SOLUTION INTRAMUSCULAR; INTRAVENOUS; SUBCUTANEOUS at 16:42

## 2022-05-12 RX ADMIN — HYDROMORPHONE HYDROCHLORIDE 0.5 MG: 1 INJECTION, SOLUTION INTRAMUSCULAR; INTRAVENOUS; SUBCUTANEOUS at 16:00

## 2022-05-12 RX ADMIN — SODIUM CHLORIDE, POTASSIUM CHLORIDE, SODIUM LACTATE AND CALCIUM CHLORIDE: 600; 310; 30; 20 INJECTION, SOLUTION INTRAVENOUS at 20:08

## 2022-05-12 RX ADMIN — HYDROMORPHONE HYDROCHLORIDE 1 MG: 1 INJECTION, SOLUTION INTRAMUSCULAR; INTRAVENOUS; SUBCUTANEOUS at 15:48

## 2022-05-12 RX ADMIN — HYDROMORPHONE HYDROCHLORIDE 0.25 MG: 1 INJECTION, SOLUTION INTRAMUSCULAR; INTRAVENOUS; SUBCUTANEOUS at 17:38

## 2022-05-12 RX ADMIN — Medication 3 MG: at 14:55

## 2022-05-12 RX ADMIN — HYDROMORPHONE HYDROCHLORIDE 0.5 MG: 1 INJECTION, SOLUTION INTRAMUSCULAR; INTRAVENOUS; SUBCUTANEOUS at 16:21

## 2022-05-12 RX ADMIN — Medication 160 MG: at 13:51

## 2022-05-12 RX ADMIN — HYDROMORPHONE HYDROCHLORIDE 1 MG: 1 INJECTION, SOLUTION INTRAMUSCULAR; INTRAVENOUS; SUBCUTANEOUS at 15:41

## 2022-05-12 RX ADMIN — CEFAZOLIN 3000 MG: 1 INJECTION, POWDER, FOR SOLUTION INTRAMUSCULAR; INTRAVENOUS; PARENTERAL at 13:44

## 2022-05-12 RX ADMIN — VASOPRESSIN 1 UNITS: 20 INJECTION INTRAVENOUS at 14:10

## 2022-05-12 ASSESSMENT — PAIN DESCRIPTION - FREQUENCY
FREQUENCY: CONTINUOUS

## 2022-05-12 ASSESSMENT — PULMONARY FUNCTION TESTS
PIF_VALUE: 23
PIF_VALUE: 2
PIF_VALUE: 22
PIF_VALUE: 1
PIF_VALUE: 27
PIF_VALUE: 22
PIF_VALUE: 22
PIF_VALUE: 35
PIF_VALUE: 22
PIF_VALUE: 23
PIF_VALUE: 22
PIF_VALUE: 23
PIF_VALUE: 24
PIF_VALUE: 23
PIF_VALUE: 23
PIF_VALUE: 22
PIF_VALUE: 27
PIF_VALUE: 41
PIF_VALUE: 2
PIF_VALUE: 21
PIF_VALUE: 22
PIF_VALUE: 5
PIF_VALUE: 22
PIF_VALUE: 3
PIF_VALUE: 23
PIF_VALUE: 23
PIF_VALUE: 25
PIF_VALUE: 23
PIF_VALUE: 21
PIF_VALUE: 20
PIF_VALUE: 23
PIF_VALUE: 24
PIF_VALUE: 29
PIF_VALUE: 23
PIF_VALUE: 23
PIF_VALUE: 24
PIF_VALUE: 22
PIF_VALUE: 24
PIF_VALUE: 35
PIF_VALUE: 28
PIF_VALUE: 23
PIF_VALUE: 23
PIF_VALUE: 1
PIF_VALUE: 22
PIF_VALUE: 21
PIF_VALUE: 20
PIF_VALUE: 22
PIF_VALUE: 23
PIF_VALUE: 22
PIF_VALUE: 22
PIF_VALUE: 25
PIF_VALUE: 22
PIF_VALUE: 22
PIF_VALUE: 24
PIF_VALUE: 36
PIF_VALUE: 22
PIF_VALUE: 3
PIF_VALUE: 22
PIF_VALUE: 23
PIF_VALUE: 22
PIF_VALUE: 22
PIF_VALUE: 23
PIF_VALUE: 21
PIF_VALUE: 24
PIF_VALUE: 28
PIF_VALUE: 15
PIF_VALUE: 23
PIF_VALUE: 22
PIF_VALUE: 2
PIF_VALUE: 24
PIF_VALUE: 23
PIF_VALUE: 23
PIF_VALUE: 28
PIF_VALUE: 22
PIF_VALUE: 22
PIF_VALUE: 23
PIF_VALUE: 31
PIF_VALUE: 28
PIF_VALUE: 23
PIF_VALUE: 20
PIF_VALUE: 23
PIF_VALUE: 20
PIF_VALUE: 7
PIF_VALUE: 22
PIF_VALUE: 22
PIF_VALUE: 27
PIF_VALUE: 23
PIF_VALUE: 28
PIF_VALUE: 27

## 2022-05-12 ASSESSMENT — PAIN DESCRIPTION - DESCRIPTORS
DESCRIPTORS: SHARP

## 2022-05-12 ASSESSMENT — PAIN DESCRIPTION - ONSET
ONSET: ON-GOING

## 2022-05-12 ASSESSMENT — PAIN SCALES - GENERAL
PAINLEVEL_OUTOF10: 10
PAINLEVEL_OUTOF10: 8
PAINLEVEL_OUTOF10: 8
PAINLEVEL_OUTOF10: 10
PAINLEVEL_OUTOF10: 9
PAINLEVEL_OUTOF10: 8
PAINLEVEL_OUTOF10: 8
PAINLEVEL_OUTOF10: 10
PAINLEVEL_OUTOF10: 8
PAINLEVEL_OUTOF10: 7
PAINLEVEL_OUTOF10: 9
PAINLEVEL_OUTOF10: 10
PAINLEVEL_OUTOF10: 10
PAINLEVEL_OUTOF10: 7
PAINLEVEL_OUTOF10: 10
PAINLEVEL_OUTOF10: 10
PAINLEVEL_OUTOF10: 9

## 2022-05-12 ASSESSMENT — PAIN DESCRIPTION - ORIENTATION
ORIENTATION: MID;INNER
ORIENTATION: MID
ORIENTATION: INNER;MID
ORIENTATION: MID;INNER

## 2022-05-12 ASSESSMENT — PAIN - FUNCTIONAL ASSESSMENT: PAIN_FUNCTIONAL_ASSESSMENT: NONE - DENIES PAIN

## 2022-05-12 ASSESSMENT — PAIN DESCRIPTION - LOCATION
LOCATION: ABDOMEN

## 2022-05-12 ASSESSMENT — PAIN DESCRIPTION - PAIN TYPE
TYPE: SURGICAL PAIN;ACUTE PAIN

## 2022-05-12 NOTE — ANESTHESIA PRE PROCEDURE
Department of Anesthesiology  Preprocedure Note       Name:  Andres Li   Age:  48 y.o.  :  1975                                          MRN:  [id number]         Date:  2022      Surgeon: Va eVga):  Alan Hunt MD    Procedure: Procedure(s):  OPEN INCISIONAL HERNIA REPAIR WITH MESH    Medications prior to admission:   Prior to Admission medications    Medication Sig Start Date End Date Taking? Authorizing Provider   loperamide (IMODIUM) 2 MG capsule take 1 capsule by mouth four times a day if needed for diarrhea 22   Criselda Tatum PA-C   warfarin (COUMADIN) 2.5 MG tablet take as directed 22   Criselda Tatum PA-C   warfarin (COUMADIN) 5 MG tablet take 1 tablet by mouth every evening WITH WARFARIN 2.5 MG FOR A TOTAL DOSE OF 7.5 MG DAILY 22   Criselda Tatum PA-C   insulin glargine (LANTUS) 100 UNIT/ML injection vial Inject 25 Units into the skin nightly 3/8/22   Criselda Tatum PA-C   Insulin Syringe-Needle U-100 30G X \" 1 ML MISC 1 each by Does not apply route daily Use as directed 3/8/22   Criselda Tatum PA-C   lisinopril (PRINIVIL;ZESTRIL) 5 MG tablet Take 1 tablet by mouth daily  Patient taking differently: Take 5 mg by mouth nightly  22   Criselda Tatum PA-C   metoprolol succinate (TOPROL XL) 25 MG extended release tablet Take 1 tablet by mouth daily  Patient taking differently: Take 25 mg by mouth nightly  22   Criselda Tatum PA-C   Continuous Blood Gluc  (FREESTYLE JOANIE READER) SAMARIA 1 Device by Does not apply route continuous 22   Criselda Tatum PA-C   Continuous Blood Gluc Sensor (FREESTYLE JOANIE 14 DAY SENSOR) MISC 1 Device by Does not apply route continuous 22   Criselda Tatum PA-C       Current medications:    No current facility-administered medications for this visit. No current outpatient medications on file.      Facility-Administered Medications Ordered in Other Visits   Medication Dose Route Frequency Provider Last Rate Last Admin    0.9 % sodium chloride infusion   IntraVENous PRN Aubrey Quiros MD        ceFAZolin (ANCEF) 3,000 mg in dextrose 5 % 100 mL IVPB  3,000 mg IntraVENous On Call to [facility], MD        enoxaparin (LOVENOX) injection 40 mg  40 mg SubCUTAneous Once Aubrey Quiros MD        sodium chloride flush 0.9 % injection 5-40 mL  5-40 mL IntraVENous 2 times per day Aubrey Quiros MD        sodium chloride flush 0.9 % injection 5-40 mL  5-40 mL IntraVENous PRN Aubrey Quiros MD           Allergies: Allergies   Allergen Reactions    Seasonal      HAYFEVER / sneezing,watery eyes    Tramadol Itching       Problem List:    Patient Active Problem List   Diagnosis Code    Neuropathic pain M79.2    Lumbar spondylosis M47.816    Osteoarthritis M19.90    Insomnia G47.00    Fibromyalgia M79.7    Vitamin D deficiency E55.9    Essential hypertension I10    Allergic rhinitis J30.9    Lumbar radiculopathy M54.16    Osteoarthritis of spine with radiculopathy, lumbar region M47.26    Class 1 obesity in adult E66.9    Lumbar disc herniation M51.26    Type 2 diabetes mellitus without complication, with long-term current use of insulin ([facility]) E11.9, Z79.4    Primary osteoarthritis of left hip M16.12    Primary osteoarthritis of right hip M16.11    Neuropathy G62.9    Left leg swelling M79.89    Rectus diastasis M62.08    Recurrent unilateral inguinal hernia K40.91    Abnormal findings on diagnostic imaging of gall bladder R93.2    Cyst of spleen D73.4    Other pulmonary embolism without acute cor pulmonale ([facility]) I26.99    Thrombocytosis D75.839    Acute blood loss anemia D62    Acute deep vein thrombosis (DVT) ([facility]) I82.409    Other hyperlipidemia E78.49    Tobacco dependence F17.200    Anticoagulated Z79.01    Cellulitis of left lower extremity L03. 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    SIMÓN (acute kidney injury) ([address] N17.9    Acute kidney injury (SIMÓN) with acute tubular necrosis (ATN) (HCC) N17.0    Incisional hernia of anterior abdominal wall without obstruction or gangrene K43.2    Hernia of abdominal wall K43.9       Past Medical History:        Diagnosis Date    Accident 11/2019    stepped on nail rt ft about 1 month ago- healed per patient    Acute thrombosis of inferior vena cava ([address] 10/10/2020    SIMÓN (acute kidney injury) ([address] 2008 apx    kidney bruised due to fall / [place]    Allergic rhinitis     Blister of left leg 8/31/2021    Cellulitis of leg, left 8/31/2021    Chronic back pain     Depression     Dermatophytosis 8/31/2021    Diabetes mellitus ([address]     Difficulty sleeping     at times    Displacement of lumbar intervertebral disc without myelopathy     Fibromyalgia     Fractured rib     2008 / healed    H/O seasonal allergies     Head injury 1980'S apx    no residual s/s    History of deep vein thrombosis 8/31/2021    Hyperlipidemia     Hypertension     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    Lymphedema of left leg 8/31/2021    Obesity (BMI 35.0-39.9 without comorbidity)     bmi 39.2  weight 296 #    Osteoarthritis     Recurrent acute deep vein thrombosis (DVT) of left lower extremity ([address] 8/31/2021    S/P IVC filter 8/31/2021    Subtherapeutic international normalized ratio (INR) 8/31/2021    Thoracic or lumbosacral neuritis or radiculitis, unspecified        Past Surgical History:        Procedure Laterality Date    COLONOSCOPY N/A 9/5/2020    COLONOSCOPY WITH BIOPSY performed by Ana Banks MD at [address] [facility] [address] PTC NEW ACCESS  8/3/2020    CT PTC NEW ACCESS 8/3/2020 [facility] CT    FOOT SURGERY Right 1985    to treat shattered bones    HERNIA REPAIR  2001 DOUBLE HERNIA    HERNIA REPAIR Right 12/9/2019    LAPAROSCOPIC RIGHT INGUINAL HERNIA REPAIR, MESH 10x15 cm PLACEMENT performed by Enoc Navarrete MD at [address] [facility] Left 4/11/2016    ILIAC ARTERY STENT INSERTION N/A 10/11/2020    S/P ILIAC STENT PLACEMENT VISUALIZATION performed by Lorenzo Lau MD at [place] [address] N/A 9/18/2020    LAPAROTOMY EXPLORATORY, CHOLECYSTECTOMY, BOWEL RESECTION, right darian colectomy, partial omentectomy, and splenectomy performed by Dominique Claudio MD at [address] FL[address] DX W/COLL[address] PFRMD N/A 5/7/2018    COLONOSCOPY DIAGNOSTIC performed by Dona Galarza MD at [address] [facility] Left 2014    Dr. Zelda Alonso ARTHROSCOPY Left 11 21 14    SHOULDER SURGERY  2001 &2007    RIGHT AND LEFT repair of tears    THROMBECTOMY / EMBOLECTOMY FEMORAL Left 1/1/2021    LEFT LOWER EXTREMITY, VENOGRAM, FOLLOW UP POSSIBLE REMOVAL LYSIS CATHETER performed by Lorenzo Lau MD at [facility] / EMBOLECTOMY FEMORAL Left 1/2/2021    LEFT LOWER EXTREMITY VENOGRAM performed by Lorenzo Lau MD at [address] [place] [address] Right 10/31/2016    Total right hip  Jorje Monson MD    UPPER GASTROINTESTINAL ENDOSCOPY N/A 9/4/2020    EGD DIAGNOSTIC ONLY performed by Lasha Cooper MD at [address] [facility] Left 1/3/2021    LEFT LOWER EXTREMITY VENOGRAM, PLACEMENT OF NEW LYSIS CATHETER performed by Lorenzo Lau MD at [address] [facility]  2007    LEFT WRIST       Social History:    Social History     Tobacco Use    Smoking status: Never Smoker    Smokeless tobacco: Current User     Types: Chew   Substance Use Topics    Alcohol use: Not Currently     Alcohol/week: 0.0 standard drinks                                Ready to quit: Not Answered  Counseling given: Not Answered      Vital Signs (Current): There were no vitals filed for this visit. BP Readings from Last 3 Encounters:   05/12/22 (!) 145/94   04/29/22 128/88   03/29/22 138/88       NPO Status:  more than 8 hrs                                                                               BMI:   Wt Readings from Last 3 Encounters:   05/12/22 290 lb (131.5 kg)   04/29/22 298 lb (135.2 kg)   03/29/22 287 lb (130.2 kg)     There is no height or weight on file to calculate BMI.    CBC:   Lab Results   Component Value Date    WBC 11.5 03/29/2022    RBC 5.08 03/29/2022    HGB 15.0 03/29/2022    HCT 45.0 03/29/2022    MCV 88.6 03/29/2022    RDW 14.0 03/29/2022     03/29/2022       CMP:   Lab Results   Component Value Date     03/29/2022    K 4.1 03/29/2022    K 3.8 10/13/2021     03/29/2022    CO2 21 03/29/2022    BUN 7 03/29/2022    CREATININE 1.1 03/29/2022    GFRAA >60 03/29/2022    LABGLOM >60 03/29/2022    GLUCOSE 99 03/29/2022    GLUCOSE 125 05/11/2012    PROT 7.2 10/08/2021    CALCIUM 9.8 03/29/2022    BILITOT 0.6 10/08/2021    ALKPHOS 145 10/08/2021    AST 36 10/08/2021    ALT 23 10/08/2021       POC Tests: No results for input(s): POCGLU, POCNA, POCK, POCCL, POCBUN, POCHEMO, POCHCT in the last 72 hours.     Coags:   Lab Results   Component Value Date    PROTIME 32.7 10/13/2021    PROTIME 38.0 06/07/2021    INR 2.9 02/14/2022    INR 2.90 02/14/2022    APTT 66.2 09/09/2021       HCG (If Applicable): No results found for: PREGTESTUR, PREGSERUM, HCG, HCGQUANT     ABGs: No results found for: PHART, PO2ART, WDV6FJP, VCP9CEG, BEART, V5FISRDC     Type & Screen (If Applicable):  No results found for: LABABO, LABRH    Drug/Infectious Status (If Applicable):  No results found for: HIV, HEPCAB    COVID-19 Screening (If Applicable):   Lab Results   Component Value Date    COVID19 Not Detected 01/05/2021    COVID19 Not Detected 09/21/2020         Anesthesia Evaluation  Patient summary reviewed and Nursing notes reviewed no history of anesthetic complications:   Airway: Mallampati: III  TM distance: >3 FB   Neck ROM: full  Mouth opening: > = 3 FB Dental:      Comment: Several Missing. None loose. Pulmonary:Negative Pulmonary ROS breath sounds clear to auscultation                             Cardiovascular:  Exercise tolerance: good (>4 METS),   (+) hypertension:, past MI: > 6 months, CAD: obstructive,       ECG reviewed  Rhythm: regular  Rate: normal  Echocardiogram reviewed         Beta Blocker:  Dose within 24 Hrs      ROS comment: EF 60-65%     Neuro/Psych:   (+) neuromuscular disease:, psychiatric history:depression/anxiety              ROS comment: Fibromyalgia  Peripheral neuropathy GI/Hepatic/Renal:            ROS comment: Obese. Endo/Other:    (+) DiabetesType II DM, using insulin, . Pt had no PAT visit        ROS comment:  Abdominal:             Vascular:   + PE (5/20). ROS comment: IVC filter  LLE DVT  IVC thrombus. Other Findings:             Anesthesia Plan      MAC     ASA 3       Induction: intravenous. Anesthetic plan and risks discussed with patient. Plan discussed with attending.                   IGOR Ruiz - CRNA   5/12/2022

## 2022-05-12 NOTE — ANESTHESIA POSTPROCEDURE EVALUATION
Department of Anesthesiology  Postprocedure Note    Patient: Brady Keller  MRN: [id number]  YOB: 1975  Date of evaluation: 5/12/2022  Time:  6:28 PM     Procedure Summary     Date: 05/12/22 Room / Location: [place] [address] / [facility]    Anesthesia Start: 3767 Anesthesia Stop: 0585    Procedure: OPEN INCISIONAL HERNIA REPAIR WITH MESH (N/A Abdomen) Diagnosis: (INCISIONAL HERNIA)    Surgeons: Pastor Carl MD Responsible Provider: Miladis Pedersen MD    Anesthesia Type: MAC ASA Status: 3          Anesthesia Type: No value filed. Cristal Phase I: Cristal Score: 9    Cristal Phase II:      Last vitals: Reviewed and per EMR flowsheets.        Anesthesia Post Evaluation    Patient location during evaluation: PACU  Patient participation: complete - patient participated  Level of consciousness: awake and alert  Airway patency: patent  Nausea & Vomiting: no nausea and no vomiting  Complications: no  Cardiovascular status: blood pressure returned to baseline and hemodynamically stable  Respiratory status: acceptable and spontaneous ventilation  Hydration status: euvolemic  Multimodal analgesia pain management approach

## 2022-05-12 NOTE — H&P
Update History & Physical    The patient's History and Physical of March 29, 2022 was reviewed with the patient and I examined the patient. There was no change. The surgical site was confirmed by the patient and me. Plan: The risks, benefits, expected outcome, and alternative to the recommended procedure have been discussed with the patient. Patient understands and wants to proceed with the procedure. Open incisional hernia repair with mesh. Electronically signed by Kehinde Lucero MD on 5/12/2022 at 1:44 PM        [facility]   History and Physical     Patient's Name/Date of Birth: Wade Gutierrez II / 1975 (48 y.o.)        PCP: Estelita Espinoza PA-C        CC:  hernia     HPI:  48 y.o. male  Incisional hernia. Has sx pain swelling in abdomen. Affects most of his daily activities. No obstructive sx. 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 Complicated medical hx, had ex lap for R sided colitis sx had R darian, lap adriano, splenectomy.   No signs of crohn's disease on path.       Past Medical History        Past Medical History:   Diagnosis Date    Accident 11/2019     stepped on nail rt ft about 1 month ago- healed per patient    Acute thrombosis of inferior vena cava ([address] 10/10/2020    SIMÓN (acute kidney injury) ([address] 2008 apx     kidney bruised due to fall / Kiera Hoberg    Allergic rhinitis      Blister of left leg 8/31/2021    Cellulitis of leg, left 8/31/2021    Chronic back pain      Depression      Dermatophytosis 8/31/2021    Diabetes mellitus ([address]      Difficulty sleeping       at times    Displacement of lumbar intervertebral disc without myelopathy      Fibromyalgia      Fractured rib       2008 / healed    H/O seasonal allergies      Head injury 1980'S apx     no residual s/s    History of deep vein thrombosis 8/31/2021    Hyperlipidemia      Hypertension      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    Lymphedema of left leg 8/31/2021    Obesity (BMI 35.0-39.9 without comorbidity)       bmi 39.2  weight 296 #    Osteoarthritis      Recurrent acute deep vein thrombosis (DVT) of left lower extremity ([address] 8/31/2021    S/P IVC filter 8/31/2021    Subtherapeutic international normalized ratio (INR) 8/31/2021    Thoracic or lumbosacral neuritis or radiculitis, unspecified              Past Surgical History         Past Surgical History:   Procedure Laterality Date    COLONOSCOPY N/A 9/5/2020     COLONOSCOPY WITH BIOPSY performed by Mariam Miles MD at [address] PTC NEW ACCESS   8/3/2020     CT PTC NEW ACCESS 8/3/2020 [facility] CT    FOOT SURGERY Right 1985     to treat shattered bones    HERNIA REPAIR   2001     DOUBLE HERNIA    HERNIA REPAIR Right 12/9/2019     LAPAROSCOPIC RIGHT INGUINAL HERNIA REPAIR, MESH 10x15 cm PLACEMENT performed by Anita Medeiros MD at [facility] Left 4/11/2016    ILIAC ARTERY STENT INSERTION N/A 10/11/2020     S/P ILIAC STENT PLACEMENT VISUALIZATION performed by Natasha Quiroz MD at [place] [address] N/A 9/18/2020     LAPAROTOMY EXPLORATORY, CHOLECYSTECTOMY, BOWEL RESECTION, right darian colectomy, partial omentectomy, and splenectomy performed by Mariam Miles MD at [address]  FLX DX W/MARIA ALEJANDRA Soterese 1980 PFRMD N/A 5/7/2018     COLONOSCOPY DIAGNOSTIC performed by Anthony Velasquez MD at [address] [facility] Left 2014     Dr. Lilia Corona ARTHROSCOPY Left 11 21 14    SHOULDER SURGERY   2001 &2007     RIGHT AND LEFT repair of tears    THROMBECTOMY / EMBOLECTOMY FEMORAL Left 1/1/2021     LEFT LOWER EXTREMITY, VENOGRAM, FOLLOW UP POSSIBLE REMOVAL LYSIS CATHETER performed by Natasha Quiroz MD at [facility] / EMBOLECTOMY FEMORAL Left 1/2/2021     LEFT LOWER EXTREMITY VENOGRAM performed by Natasha Quiroz MD at [address] [facility] Right 10/31/2016     Total right hip  J. Portillo Duarte MD    UPPER GASTROINTESTINAL ENDOSCOPY N/A 9/4/2020     EGD DIAGNOSTIC ONLY performed by Sanjuana Escamilla MD at [address] Left 1/3/2021     LEFT LOWER EXTREMITY VENOGRAM, PLACEMENT OF NEW LYSIS CATHETER performed by Micaela Silva MD at [place] [address]   2007     LEFT WRIST            Family History         Family History   Problem Relation Age of Onset    Hypertension Mother      Arthritis Father      Diabetes Father      Cancer Maternal Grandfather           Skin    Cancer Paternal Uncle           skin    Diabetes Paternal Grandfather      Heart Disease Maternal Grandmother      Stroke Maternal Aunt              Social History               Socioeconomic History    Marital status:        Spouse name: Not on file    Number of children: Not on file    Years of education: Not on file    Highest education level: Not on file   Occupational History    Occupation: disabled from     Tobacco Use    Smoking status: Never Smoker    Smokeless tobacco: Current User       Types: Chew   Vaping Use    Vaping Use: Never used   Substance and Sexual Activity    Alcohol use: Not Currently       Alcohol/week: 0.0 standard drinks    Drug use: No    Sexual activity: Not Currently   Other Topics Concern    Not on file   Social History Narrative    Not on file      Social Determinants of Health          Financial Resource Strain: Low Risk     Difficulty of Paying Living Expenses: Not hard at all   Food Insecurity: No Food Insecurity    Worried About Running Out of Food in the Last Year: Never true    Ruthie of Food in the Last Year: Never true   Transportation Needs:     Lack of Transportation (Medical): Not on file    Lack of Transportation (Non-Medical):  Not on file   Physical Activity:     Days of Exercise per Week: Not on file    Minutes of Exercise per Session: Not on file   Stress:     Feeling of Stress : Not on file   Social Connections:     Frequency of Communication with Friends and Family: Not on file    Frequency of Social Gatherings with Friends and Family: Not on file    Attends Religion Services: Not on file    Active Member of Clubs or Organizations: Not on file    Attends Club or Organization Meetings: Not on file    Marital Status: Not on file   Intimate Partner Violence:     Fear of Current or Ex-Partner: Not on file    Emotionally Abused: Not on file    Physically Abused: Not on file    Sexually Abused: Not on file   Housing Stability:     Unable to Pay for Housing in the Last Year: Not on file    Number of [place] in the Last Year: Not on file    Unstable Housing in the Last Year: Not on file            Current Facility-Administered Medications          Current Outpatient Medications   Medication Sig Dispense Refill    insulin glargine (LANTUS) 100 UNIT/ML injection vial Inject 25 Units into the skin nightly 10 mL 5    Insulin Syringe-Needle U-100 30G X 5/16\" 1 ML MISC 1 each by Does not apply route daily Use as directed 100 each 5    lisinopril (PRINIVIL;ZESTRIL) 5 MG tablet Take 1 tablet by mouth daily 90 tablet 1    loperamide (IMODIUM) 2 MG capsule take 1 capsule by mouth four times a day if needed for diarrhea 120 capsule 0    metoprolol succinate (TOPROL XL) 25 MG extended release tablet Take 1 tablet by mouth daily 30 tablet 5    warfarin (COUMADIN) 5 MG tablet Take 1 tablet by mouth every evening Given with Warfarin 2.5 mg total dose Warfarin 7.5 mg daily 30 tablet 1    Continuous Blood Gluc  (FREESTYLE JOANIE READER) SAMARIA 1 Device by Does not apply route continuous 1 each 0    Continuous Blood Gluc Sensor (FREESTYLE JOANIE 14 DAY SENSOR) MISC 1 Device by Does not apply route continuous 4 each 2    cyclobenzaprine (FLEXERIL) 10 MG tablet Take 10 mg by mouth 2 times daily as needed for Muscle spasms        ibuprofen (ADVIL;MOTRIN) 200 MG tablet Take 400 mg by mouth every 6 hours as needed for Pain        warfarin (COUMADIN) 2.5 MG tablet Take as directed 90 tablet 1    melatonin 3 MG TABS tablet Take 3 mg by mouth nightly as needed (sleep)        hydroxyurea (HYDREA) 500 MG chemo capsule Take 500 mg by mouth 2 times daily           No current facility-administered medications for this visit.                  Allergies   Allergen Reactions    Seasonal         HAYFEVER / sneezing,watery eyes    Tramadol Itching         REVIEW OF SYSTEMS:    Constitutional: negative   Eyes: negative  Ears, nose, mouth, throat, and face: negative  Respiratory: negative  Cardiovascular: negative  Gastrointestinal: negative  Genitourinary:negative  Integument/breast: negative  Hematologic/lymphatic: negative  Musculoskeletal:negative  Neurological: negative  Allergic/Immunologic: negative     Physical Exam:     @/88   Pulse 89   Temp 98.1 °F (36.7 °C)   Resp 18   Ht 6' 1\" (1.854 m)   Wt 287 lb (130.2 kg)   SpO2 97%   BMI 37.87 kg/m² @     GENERAL EXAM: On exam- pt appears stated age. No acute distress. NEURO:  Alert and oriented x 3. No obvious neuro deficits   HEENT: head- atraumatic-normocephalic. No discharge from ears, nose or throat. NECK: Supple. No jugular venous distention. CVS:RR  LUNGS: Bilateral chest movements without the use of accessory muscles. Respirations easy, nonlabored,  ABDOMEN: Abdomen soft, midline incision with palpable hernia defect,   RECTAL: exam deferred. EXTREMITIES: No edema, NVI and symmetrical        IMPRESSION/PLAN: This is a 48 y.o. male who has an incisional hernia      - CT abd pel and will discuss repair options once done. - will need medical risk stratification and evaluation for holding coumadin?  If bridging needed.       Electronically Signed by Nasreen Guerrero MD FACS   2:12 PM

## 2022-05-13 LAB
METER GLUCOSE: 149 MG/DL (ref 74–99)
METER GLUCOSE: 154 MG/DL (ref 74–99)
METER GLUCOSE: 159 MG/DL (ref 74–99)
METER GLUCOSE: 167 MG/DL (ref 74–99)
METER GLUCOSE: 175 MG/DL (ref 74–99)

## 2022-05-13 PROCEDURE — 97165 OT EVAL LOW COMPLEX 30 MIN: CPT

## 2022-05-13 PROCEDURE — 2580000003 HC RX 258: Performed by: STUDENT IN AN ORGANIZED HEALTH CARE EDUCATION/TRAINING PROGRAM

## 2022-05-13 PROCEDURE — G0378 HOSPITAL OBSERVATION PER HR: HCPCS

## 2022-05-13 PROCEDURE — 99024 POSTOP FOLLOW-UP VISIT: CPT | Performed by: SURGERY

## 2022-05-13 PROCEDURE — 97535 SELF CARE MNGMENT TRAINING: CPT

## 2022-05-13 PROCEDURE — 6370000000 HC RX 637 (ALT 250 FOR IP): Performed by: STUDENT IN AN ORGANIZED HEALTH CARE EDUCATION/TRAINING PROGRAM

## 2022-05-13 PROCEDURE — S5553 INSULIN LONG ACTING 5 U: HCPCS | Performed by: STUDENT IN AN ORGANIZED HEALTH CARE EDUCATION/TRAINING PROGRAM

## 2022-05-13 PROCEDURE — 6360000002 HC RX W HCPCS: Performed by: STUDENT IN AN ORGANIZED HEALTH CARE EDUCATION/TRAINING PROGRAM

## 2022-05-13 PROCEDURE — 82962 GLUCOSE BLOOD TEST: CPT

## 2022-05-13 RX ADMIN — SENNOSIDES AND DOCUSATE SODIUM 1 TABLET: 50; 8.6 TABLET ORAL at 08:40

## 2022-05-13 RX ADMIN — INSULIN GLARGINE-YFGN 25 UNITS: 100 INJECTION, SOLUTION SUBCUTANEOUS at 21:00

## 2022-05-13 RX ADMIN — ENOXAPARIN SODIUM 40 MG: 100 INJECTION SUBCUTANEOUS at 08:39

## 2022-05-13 RX ADMIN — SODIUM CHLORIDE, PRESERVATIVE FREE 10 ML: 5 INJECTION INTRAVENOUS at 09:10

## 2022-05-13 RX ADMIN — GABAPENTIN 100 MG: 100 CAPSULE ORAL at 02:06

## 2022-05-13 RX ADMIN — INSULIN LISPRO 2 UNITS: 100 INJECTION, SOLUTION INTRAVENOUS; SUBCUTANEOUS at 12:10

## 2022-05-13 RX ADMIN — METHOCARBAMOL TABLETS 1500 MG: 750 TABLET, COATED ORAL at 12:51

## 2022-05-13 RX ADMIN — INSULIN LISPRO 2 UNITS: 100 INJECTION, SOLUTION INTRAVENOUS; SUBCUTANEOUS at 17:15

## 2022-05-13 RX ADMIN — GABAPENTIN 100 MG: 100 CAPSULE ORAL at 18:14

## 2022-05-13 RX ADMIN — ACETAMINOPHEN 650 MG: 325 TABLET ORAL at 17:48

## 2022-05-13 RX ADMIN — OXYCODONE HYDROCHLORIDE 10 MG: 10 TABLET ORAL at 20:02

## 2022-05-13 RX ADMIN — ACETAMINOPHEN 650 MG: 325 TABLET ORAL at 12:51

## 2022-05-13 RX ADMIN — OXYCODONE HYDROCHLORIDE 10 MG: 10 TABLET ORAL at 23:59

## 2022-05-13 RX ADMIN — OXYCODONE HYDROCHLORIDE 10 MG: 10 TABLET ORAL at 10:45

## 2022-05-13 RX ADMIN — METHOCARBAMOL TABLETS 1500 MG: 750 TABLET, COATED ORAL at 17:48

## 2022-05-13 RX ADMIN — ASPIRIN 81 MG 81 MG: 81 TABLET ORAL at 08:40

## 2022-05-13 RX ADMIN — OXYCODONE HYDROCHLORIDE 10 MG: 10 TABLET ORAL at 14:52

## 2022-05-13 RX ADMIN — METHOCARBAMOL TABLETS 1500 MG: 750 TABLET, COATED ORAL at 08:39

## 2022-05-13 RX ADMIN — GABAPENTIN 100 MG: 100 CAPSULE ORAL at 08:40

## 2022-05-13 RX ADMIN — OXYCODONE HYDROCHLORIDE 10 MG: 10 TABLET ORAL at 05:59

## 2022-05-13 RX ADMIN — SODIUM CHLORIDE, PRESERVATIVE FREE 10 ML: 5 INJECTION INTRAVENOUS at 20:02

## 2022-05-13 RX ADMIN — OXYCODONE HYDROCHLORIDE 10 MG: 10 TABLET ORAL at 00:20

## 2022-05-13 RX ADMIN — METHOCARBAMOL TABLETS 1500 MG: 750 TABLET, COATED ORAL at 20:02

## 2022-05-13 RX ADMIN — METOPROLOL SUCCINATE 25 MG: 50 TABLET, EXTENDED RELEASE ORAL at 08:40

## 2022-05-13 ASSESSMENT — PAIN DESCRIPTION - DESCRIPTORS: DESCRIPTORS: SHARP;DISCOMFORT

## 2022-05-13 ASSESSMENT — PAIN SCALES - GENERAL
PAINLEVEL_OUTOF10: 8
PAINLEVEL_OUTOF10: 0
PAINLEVEL_OUTOF10: 10
PAINLEVEL_OUTOF10: 8

## 2022-05-13 ASSESSMENT — PAIN DESCRIPTION - LOCATION
LOCATION: BACK;ABDOMEN
LOCATION: ABDOMEN;BACK

## 2022-05-13 ASSESSMENT — PAIN DESCRIPTION - ORIENTATION: ORIENTATION: MID

## 2022-05-13 NOTE — DISCHARGE INSTR - COC
Continuity of Care Form    Patient Name: Mary Bustos   :  1975  MRN:  [id number]    Admit date:  2022  Discharge date:  ***    Code Status Order: Full Code   Advance Directives:   Advance Care Flowsheet Documentation       Date/Time Healthcare Directive Type of Healthcare Directive Copy in [address] Agent's Name Healthcare Agent's Phone Number    [phone] No, patient does not have an advance directive for healthcare treatment -- -- -- -- --            Admitting Physician:  Hamilton Ovalle MD  PCP: Mariah Ross PA-C    Discharging Nurse: [facility]/Room#: 0303/1240-V  Discharging Unit Phone Number: ***    Emergency Contact:   Extended Emergency Contact Information  Primary Emergency Contact: [address]  Phone: [phone]  Mobile Phone: [phone]  Relation: Child   needed?  No    Past Surgical History:  Past Surgical History:   Procedure Laterality Date    COLONOSCOPY N/A 2020    COLONOSCOPY WITH BIOPSY performed by Hamilton Ovalle MD at [facility] NEW ACCESS  8/3/2020    CT PTC NEW ACCESS 8/3/2020 [facility] CT    FOOT SURGERY Right     to treat shattered bones    HERNIA REPAIR      DOUBLE HERNIA    HERNIA REPAIR Right 2019    LAPAROSCOPIC RIGHT INGUINAL HERNIA REPAIR, MESH 10x15 cm PLACEMENT performed by Becca Raya MD at [place] [address] Left 2016    ILIAC ARTERY STENT INSERTION N/A 10/11/2020    S/P ILIAC STENT PLACEMENT VISUALIZATION performed by Laverne Chawla MD at [address] N/A 2020    LAPAROTOMY EXPLORATORY, CHOLECYSTECTOMY, BOWEL RESECTION, right darian colectomy, partial omentectomy, and splenectomy performed by [place]ton Ovalle MD at [address] [facility] [address] FLX DX W/MARIA ALEJANDRA [place] 1980 PFRMD N/A 2018    COLONOSCOPY [facility]NOSTIC [facility]  Dr. Tammy Coffey ARTHROSCOPY Left 11 21 15    SHOULDER SURGERY  2001 &2007    RIGHT AND LEFT repair of tears    THROMBECTOMY / EMBOLECTOMY FEMORAL Left 1/1/2021    LEFT LOWER EXTREMITY, VENOGRAM, FOLLOW UP POSSIBLE REMOVAL LYSIS CATHETER performed by Asad Hayes MD at [address] / EMBOLECTOMY FEMORAL Left 1/2/2021    LEFT LOWER EXTREMITY VENOGRAM performed by Asad Hayes MD at [address] Right 10/31/2016    Total right hip  Dong Bucio MD    UPPER GASTROINTESTINAL ENDOSCOPY N/A 9/4/2020    EGD DIAGNOSTIC ONLY performed by Chata Menezes MD at [address] [facility] Left 1/3/2021    LEFT LOWER EXTREMITY VENOGRAM, PLACEMENT OF NEW LYSIS CATHETER performed by Asad Hayes MD at [place] [address] N/A 5/12/2022    OPEN [address] [place] performed by Ana Banks MD at [place] [address]  2007    LEFT WRIST       Immunization History:   Immunization History   Administered Date(s) Administered    Hib PRP-OMP (PedvaxHIB) 09/24/2020    Influenza Virus Vaccine 11/23/2015, 09/26/2019    Influenza, [place] Pulse, IM, PF (6 mo and older Fluzone, Flulaval, Fluarix, and 3 yrs and older Afluria) 10/04/2016, 10/26/2017, 09/26/2019    Meningococcal B, OMV (Bexsero) 09/23/2020    Meningococcal MCV4O (Menveo) 09/24/2020    Pneumococcal Conjugate 13-valent (Bjhzkgu27) 09/23/2020    Pneumococcal Polysaccharide (Sldlxnvgt99) 10/04/2016, 07/08/2019    Tdap (Boostrix, Adacel) 10/04/2016       Active Problems:  Patient Active Problem List   Diagnosis Code    Neuropathic pain M79.2    Lumbar spondylosis M47.816    Osteoarthritis M19.90    Insomnia G47.00    Fibromyalgia M79.7    Vitamin D deficiency E55.9    Essential hypertension I10    Allergic rhinitis J30.9    Lumbar radiculopathy M54.16    Osteoarthritis of spine with radiculopathy, lumbar region M47.26    Class 1 obesity in adult E66.9 Lumbar disc herniation M51.26    Type 2 diabetes mellitus without complication, with long-term current use of insulin ([facility]) E11.9, Z79.4    Primary osteoarthritis of left hip M16.12    Pre-operative laboratory examination Z01.812    Primary osteoarthritis of right hip M16.11    Neuropathy G62.9    Left leg swelling M79.89    Rectus diastasis M62.08    Recurrent unilateral inguinal hernia K40.91    Abnormal findings on diagnostic imaging of gall bladder R93.2    Cyst of spleen D73.4    Other pulmonary embolism without acute cor pulmonale ([facility]) I26.99    Thrombocytosis D75.839    Acute blood loss anemia D62    Acute deep vein thrombosis (DVT) ([facility]) I82.409    Other hyperlipidemia E78.49    Tobacco dependence F17.200    Anticoagulated Z79.01    Cellulitis of left lower extremity L03. 116    Dermatophytosis B35.9    Lymphedema of left leg I89.0    Recurrent acute deep vein thrombosis (DVT) of left lower extremity ([facility]) I82.402    S/P IVC filter Z95.828    History of deep vein thrombosis Z86.718    Subtherapeutic international normalized ratio (INR) R79.1    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[facility]) I82.220    SIMÓN (acute kidney injury) ([facility] N17.9    Acute kidney injury (SIMÓN) with acute tubular necrosis (ATN) ([facility]) N17.0    Incisional hernia of anterior abdominal wall without obstruction or gangrene K43.2    Hernia of abdominal wall K43.9       Isolation/Infection:   Isolation            No Isolation          Patient Infection Status       Infection Onset Added Last Indicated Last Indicated By Review Planned Expiration Resolved Resolved By    None active    Resolved    COVID-19 (Rule Out) 01/05/21 01/05/21 01/05/21 COVID-19 (Ordered)   01/05/21 Rule-Out Test Resulted    COVID-19 (Rule Out) 12/31/20 12/31/20 12/31/20 COVID-19 (Ordered)   12/31/20 Rule-Out Test Resulted    COVID-19 (Rule Out) 11/18/20 11/18/20 11/18/20 COVID-19 (Ordered)   11/18/20 Rule-Out Test Resulted    COVID-19 10/09/20 10/09/20 10/09/20 COVID-19   10/23/20     COVID-19 (Rule Out) 10/09/20 10/09/20 10/09/20 COVID-19 (Ordered)   10/09/20 Rule-Out Test Resulted    C-diff Rule Out 20 Clostridium difficile EIA (Ordered)   20 Rule-Out Test Resulted    COVID-19 (Rule Out) 20 Covid-19 Ambulatory (Ordered)   20 Rule-Out Test Resulted    COVID-19 (Rule Out) 20 COVID-19 (Ordered)   20 Rule-Out Test Resulted    COVID-19 (Rule Out) 20 COVID-19 (Ordered)   20 Rule-Out Test Resulted    C-diff Rule Out 20 Clostridium difficile EIA (Ordered)   20 Curtis Dsouza RN    Order discontinued by RN/physician    MRSA 19 Wound Culture   20 Curtis Dsouza RN            Nurse Assessment:  Last Vital Signs: /78   Pulse 87   Temp 98.2 °F (36.8 °C) (Axillary)   Resp 18   Ht 6' 1\" (1.854 m)   Wt (!) 321 lb 9.6 oz (145.9 kg)   SpO2 95%   BMI 42.43 kg/m²     Last documented pain score (0-10 scale): Pain Level: 0  Last Weight:   Wt Readings from Last 1 Encounters:   22 (!) 321 lb 9.6 oz (145.9 kg)     Mental Status:  {IP PT MENTAL STATUS:73241}    IV Access:  { MILANA IV ACCESS:416938186}    Nursing Mobility/ADLs:  Walking   {CHP DME SKYL:557342528}  Transfer  {CHP DME LSZL:920749802}  Bathing  {CHP DME XJDQ:441856676}  Dressing  {CHP DME ZXCU:799579461}  Toileting  {CHP DME TROV:474974615}  Feeding  {CHP DME RPC}  Med Admin  {CHP DME QHSK:877386789}  Med Delivery   { MILANA MED Delivery:269349564}    Wound Care Documentation and Therapy:  Wound 10/08/20 Coccyx (Active)   Number of days: 582       Incision 22 Abdomen Medial;Lower (Active)   Dressing Status Dry; Intact; Clean 22   Dressing/Treatment Surgical glue; Open to air 22 183   Closure Surgical glue 22 1830   Margins Approximated 22 183   Drainage Amount None 22   Odor None 22 Number of days: 1       Incision 21 Tibial Left;Posterior (Active)   Number of days: 494        Elimination:  Continence: Bowel: {YES / IE:17941}  Bladder: {YES / HJ:03061}  Urinary Catheter: {Urinary Catheter:690944283}   Colostomy/Ileostomy/Ileal Conduit: {YES / RJ:39251}       Date of Last BM: ***    Intake/Output Summary (Last 24 hours) at 2022 0950  Last data filed at 2022 0401  Gross per 24 hour   Intake 4576.2 ml   Output 420 ml   Net 4156.2 ml     I/O last 3 completed shifts: In: 4576.2 [P.O.:2000;  I.V.:2476.2; IV Piggyback:100]  Out: 420 [Urine:400; Blood:20]    Safety Concerns:     [address] [employer] Safety Concerns:910595388}    Impairments/Disabilities:      [address] [employer] Impairments/Disabilities:237330548}    Nutrition Therapy:  Current Nutrition Therapy:   [address] [employer] Diet List:824097941}    Routes of Feeding: {P DME Other Feedings:486221774}  Liquids: {Slp liquid thickness:92119}  Daily Fluid Restriction: {CHP DME Yes amt example:236326189}  Last Modified Barium Swallow with Video (Video Swallowing Test): {Done Not Done [facility]:301439940}    Treatments at the Time of Hospital Discharge:   Respiratory Treatments: ***  Oxygen Therapy:  {Therapy; copd oxygen:76503}  Ventilator:    { CC Vent EIPE:964416927}    Rehab Therapies: {THERAPEUTIC INTERVENTION:4693933314}  Weight Bearing Status/Restrictions: [address] [employer]  Weight Bearin}  Other Medical Equipment (for information only, NOT a DME order):  {EQUIPMENT:777402041}  Other Treatments: ***    Patient's personal belongings (please select all that are sent with patient):  {[facility] DME Belongings:388471778}    RN SIGNATURE:  {Esignature:353173749}    CASE MANAGEMENT/SOCIAL WORK SECTION    Inpatient Status Date: 2022 (OBSERVATION STATUS)    Readmission Risk Assessment Score:  Readmission Risk              Risk of Unplanned Readmission:  11           Discharging to Facility/ Agency   Name: [facility]  Address: [address] [place] [address] [place]  Phone: [phone]  Fax: [phone]    Dialysis Facility (if applicable)   Name:  Address:  Dialysis Schedule:  Phone:  Fax:    / signature: Electronically signed by Reina Lind RN on 5/13/22 at 9:51 AM EDT    PHYSICIAN SECTION    Prognosis: {Prognosis:8940990600}    Condition at Discharge: [address] Natalie Ramírez Patient Condition:120424743}    Rehab Potential (if transferring to Rehab): {Prognosis:4459923039}    Recommended Labs or Other Treatments After Discharge: ***    Physician Certification: I certify the above information and transfer of Homer Avila II  is necessary for the continuing treatment of the diagnosis listed and that he requires Home Care for less 30 days.      Update Admission H&P: {CHP DME Changes in IEAAK:031841179}    PHYSICIAN SIGNATURE:  {Esignature:812267103}

## 2022-05-13 NOTE — PROGRESS NOTES
[address]        Date:2022                                                  Patient Name: Homar Jimenez    MRN: [id number]    : 1975    Room: [address] [place] [address]A      Evaluating OT: Suraj Beckford, [address] RaphaelNancy Sandhu OTR/L; [id number]      Referring Provider: IGOR Blanton CNP    Specific Provider Orders/Date: OT Eval and Treat 2022      Diagnosis: Hernia of abdominal wall     Surgery: OPEN INCISIONAL HERNIA REPAIR WITH MESH  2022    Pertinent Medical History:  has a past medical history of Accident, Acute thrombosis of inferior vena cava ([address] SIMÓN (acute kidney injury) ([address] Allergic rhinitis, Blister of left leg, Cellulitis of leg, left, Chronic back pain, Depression, Dermatophytosis, Diabetes mellitus ([address] Difficulty sleeping, Displacement of lumbar intervertebral disc without myelopathy, Fibromyalgia, Fractured rib, H/O seasonal allergies, Head injury, History of deep vein thrombosis, Hyperlipidemia, Hypertension, 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[address] Lymphedema of left leg, Obesity (BMI 35.0-39.9 without comorbidity), Osteoarthritis, Recurrent acute deep vein thrombosis (DVT) of left lower extremity ([address] S/P IVC filter, Subtherapeutic international normalized ratio (INR), and Thoracic or lumbosacral neuritis or radiculitis, unspecified.      Reason for Admission: INCISIONAL HERNIA and swelling in abdomen    Recommended Adaptive Equipment: reacher, sock aide, and shoe horn (pt owns tub transfer bench, ww, and cane)     Precautions:  Fall Risk, IV, abdominal binder and precautions     Assessment of current deficits    [x] Functional mobility  [x]ADLs  [x] Strength               [x]Cognition    [x] Functional transfers   [x] IADLs         [x] Safety Awareness   [x]Endurance    [x] Fine Coordination              [x] Balance      [] Vision/perception   []Sensation []Gross Motor Coordination  [] ROM  [] Delirium                   [] Motor Control     OT PLAN OF CARE   OT POC based on physician orders, patient diagnosis and results of clinical assessment    Frequency/Duration: 1-3 days/wk for 2 weeks PRN   Specific OT Treatment Interventions to include:   * Instruction/training on adapted ADL techniques and AE recommendations to increase functional independence within precautions       * Training on energy conservation strategies, correct breathing pattern and techniques to improve independence/tolerance for self-care routine  * Functional transfer/mobility training/DME recommendations for increased independence, safety, and fall prevention  * Patient/Family education to increase follow through with safety techniques and functional independence  * Recommendation of environmental modifications for increased safety with functional transfers/mobility and ADLs  * Therapeutic exercise to improve motor endurance, ROM, and functional strength for ADLs/functional transfers  * Therapeutic activities to facilitate/challenge dynamic balance, stand tolerance for increased safety and independence with ADLs    Home Living: Pt lives with son in 2 level home with 1+3 steps and 1HR to enter. Laundry located in basement with full flight of stairs and 1HR to access.   Bathroom setup: tub/shower unit and tub transfer bench  Equipment owned: cane, ww, tub transfer bench    Prior Level of Function: independent with ADLs , assist from son with IADLs - including groceries, cooking, and laundry (son carries laundry up and down stairs for pt)   ambulated with cane up and down stairs - with 1 cane at the top and 1 at the bottom, ww within home  Driving: yes   Occupation: retired     Pain Level: 9/10 abdominal   Cognition: A&O: 4/4; Follows multi step directions   Memory:  good   Sequencing:  fair   Problem solving:  fair   Judgement/safety:  fair     Functional Assessment:  AM-PAC Daily Activity Raw Score: 15/24   Initial Eval Status  Date: 5/13/22 Treatment Status  Date: STGs = LTGs  Time frame: 10-14 days   Feeding Independent       Grooming Stand by Assist   Simulated functional reach for face washing seated EOB   Limited d/t increased abdominal stress with raised arms  Independent    UB Dressing Stand by Assist   Shemar gown seated EOB   Independent    LB Dressing Dependent   Shemar socks seated EOB   Provided sock aide/shoe horn/reacher - educated pt on use seated EOB  Moderate [place]    Bathing Maximal Assist  Educated on continued used of of tub transfer bench for safety/endurance  Limited LB functional reach seated EOB for bathing  Provided long handled sponge for bathing  Minimal Assist    Toileting Maximal Assist   Pt encouraged to ambulate to bathroom for bladder and bowel movements (urnial available as needed)  Minimal Assist    Bed Mobility  Log Roll: SBA  Supine to sit: Minimal Assist   Sit to supine: Minimal Assist   Assist with BLE for transfer d/t increased pain  Supine to sit: Independent   Sit to supine: Independent    Functional Transfers Sit to stand: Min A with ww   Stand to sit:Min A with ww  Stand pivot: Min A with ww  Commode: NT  Sit to stand: Mod A with AD  Stand to sit:Mod A with AD  Stand pivot: Mod A with AD  Commode: Mod A with AD    Functional Mobility Min A with ww  Sit steps to R towards HOB and stand pivot at EOB  Pt limited d/t increased abdominal pain with movement  Moderate [place] with AD   Balance Sitting:     Static - IND     Dynamic - SBA  Standing: Min A with ww  Sitting:  Static: IND  Dynamic: IND  Standing:  Mod IND with AD   Activity Tolerance Fair   Limited d/t overall abdominal pain  Good   Visual/  Perceptual Glasses: no     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 (pt reports unable to see below waist d/t visual deficits)                Hand Dominance: Right    AROM (PROM) Strength Additional Info:  Goal:   RUE  WFL 4/5 good  and wfl FMC/dexterity noted during ADL tasks   Improve overall RUE strength to 4+/5 for participation in functional tasks       LUE WFL 4/5 Good  and wfl FMC/dexterity noted during ADL tasks   Improve overall LUE strength to 4+/5 for participation in functional tasks       [facility]   Sensation:  No c/o numbness or tingling   Tone: WFL   Edema: unremarkable    Patient/Family Goal: pt goal is to return home with herapy    Comment: Cleared by RN to see pt. Upon arrival patient lying supine in bed and agreeable to OT session. At end of session, patient lying supine in bed with call light and phone within reach, all lines and tubes intact. Overall patient demonstrated  decreased independence and safety during completion of ADL/functional transfer/mobility tasks. Pt would benefit from continued skilled OT to increase safety and independence with completion of ADL/IADL tasks for functional independence and quality of life. Treatment:   OT treatment provided this date includes:  Facilitation of bed mobility, unsupported sitting balance at EOB (decreased dynamic sitting balance impacting ADL - addressed posture, weight shifting, dynamic reaching to improve independence, difficulty with repositioning in bed requiring increased verbal cues and time to complete), functional transfer from [facility] to EOB (verbal/tactile cues and retraining for transfers from various surfaces d/t decreased balance/sequencing for safety) standing tolerance tasks (addressed posture, balance and activity tolerance d/t impact on ADLs and functional activity - required use of ww in standing) and functional mobility from [facility] to EOB with use of ww (in preparation for ambulation to/ from bathroom; cuing on posture, w/w management and safety) - skilled cuing on hand placement, posture, body mechanics, energy conservation techniques and safety during functional mobility and ADLs.    Therapist facilitated self-care retraining: UB/LB self-care tasks (downing gown over shoulders, and socks seated EOB), toileting hygiene task and grooming tasks while educating pt on modified techniques, posture, safety and energy conservation techniques. Skilled monitoring of BP, HR, O2 sats and pts response to treatment (monitored O2 - stats above). Rehab Potential: Good  for established goals       LTG: maximize independence with ADLs to return to PLOF    Patient and/or family were instructed on functional diagnosis, prognosis/goals and OT plan of care. Demonstrated fair understanding. [] Malnutrition indicators have been identified and nursing has been notified to ensure a dietitian consult is ordered. ·  Eval Complexity: Low    Evaluation time includes thorough review of current medical information, gathering information on past medical & social history & PLOF, completion of standardized testing, informal observation of tasks, consultation with other medical professions/disciplines, assessment of data & development of POC/goals. Time In: 0912  Time Out: 0932  Total Treatment Time: 15    Min Units   OT Eval Low 79682  x     OT Eval Medium 28023      OT Eval High 00004      OT Re-Eval [id number]       Therapeutic Ex 56276       Therapeutic Activities 00525  15  1   ADL/Self Care 88064       Orthotic Management 17269       Manual [id number]     Neuro Re-Ed 57491       Non-Billable Time          Evaluation Time additionally includes thorough review of current medical information, gathering information on past medical history/social history and prior level of function, interpretation of standardized testing/informal observation of tasks, assessment of data and development of plan of care and goals.           Rachel Patient, MSOT OTR/L; [id number]

## 2022-05-13 NOTE — PROGRESS NOTES
General Surgery Progress Note:    CC: post op     S: doing well pain control moderate,       Objective:  @/78   Pulse 87   Temp 98.2 °F (36.8 °C) (Axillary)   Resp 18   Ht 6' 1\" (1.854 m)   Wt (!) 321 lb 9.6 oz (145.9 kg)   SpO2 95%   BMI 42.43 kg/m² @    Physical -     Gen: NAD  Resp: Breathing Comfortably, no resp distress  CV: Reg Rate  Abd: wound CDI  EXT rom wnl vni    Assessment/Plan: s/p open VHR with mesh     - diet, stop fluid, PTOT, multimodal pain control   - home meds, restart warfarin tomorrow,   D/c planning for tomorrow     VTE Prophylaxis: scds lovenox      Joy Moreno MD FACS   See d/c summary     9:35 AM

## 2022-05-13 NOTE — CARE COORDINATION
5/13 Care Coordination. Patient underwent scheduled open incisional hernia repair yesterday with Dr. Nicki Lyn. Abdominal binder is in place. PT/OT to evaluate. Met with patient at bedside regarding transition of care planning. Patient's PCP is MARTIN Gil and he uses [facility] in [place]. Patient lives with his son Marium Long in a 2 story home with 3 MARTHA. DME owned is a FWW, cane, BSC, SC and WC. History of HHC with [facility] and [facility] at [facility]. Plan on discharge is to return home with no needs identified at this time and son will provide transportation. Will await PT/OT evals. [address] with OT regarding eval and they recommended HHC. Patient in agreement to use [facility] again, referral called to [facility]. First SOC is 5/17. Message sent to [facility] to see if Dr. Nicki Lyn will follow orders and if any skilled needs are noted or just PT/OT and if 5/17 is acceptable. Will await response. MADDIE Mendez, RN  [facility] Case Management   Cell: [phone]     The Plan for Transition of Care is related to the following treatment goals: [facility] AT [facility]    The Patient was provided with a choice of provider and agrees with the discharge plan. [x] Yes [] No (DECLINED LIST)    Freedom of choice list was provided with basic dialogue that supports the patient's individualized plan of care/goals, treatment preferences and shares the quality data associated with the providers.  [] Yes [x] No (DECLINED LIST)

## 2022-05-13 NOTE — PLAN OF CARE
Problem: Pain  Goal: Verbalizes/displays adequate comfort level or baseline comfort level  Outcome: Not Progressing     Problem: Discharge Planning  Goal: Discharge to home or other facility with appropriate resources  Outcome: Progressing  Flowsheets (Taken 5/12/2022 9724)  Discharge to home or other facility with appropriate resources: Refer to discharge planning if patient needs post-hospital services based on physician order or complex needs related to functional status, cognitive ability or social support system     Problem: Chronic Conditions and Co-morbidities  Goal: Patient's chronic conditions and co-morbidity symptoms are monitored and maintained or improved  Outcome: Progressing     Problem: Skin/Tissue Integrity  Goal: Absence of new skin breakdown  Description: 1. Monitor for areas of redness and/or skin breakdown  2. Assess vascular access sites hourly  3. Every 4-6 hours minimum:  Change oxygen saturation probe site  4. Every 4-6 hours:  If on nasal continuous positive airway pressure, respiratory therapy assess nares and determine need for appliance change or resting period.   Outcome: Progressing     Problem: Safety - Adult  Goal: Free from fall injury  Outcome: Progressing     Problem: ABCDS Injury Assessment  Goal: Absence of physical injury  Outcome: Progressing

## 2022-05-13 NOTE — OP NOTE
Operative Note      Patient: Mary Bustos  YOB: 1975  MRN: [id number]    Date of Procedure: 5/12/2022    Pre-Op Diagnosis: INCISIONAL HERNIA    Post-Op Diagnosis: Same       Procedure(s):  OPEN INCISIONAL HERNIA REPAIR WITH MESH    Surgeon(s):  Hamilton Ovalle MD    Assistant:   Resident: Marybel Gardner MD    Anesthesia: General    Estimated Blood Loss (mL): Minimal    Complications: None    Specimens:   ID Type Source Tests Collected by Time Destination   A : [facility] Tissue Tissue SURGICAL PATHOLOGY Hamilton Ovalle MD 5/12/2022 1419        Implants:  Implant Name Type Inv. Item Serial No.  Lot No. LRB No. Used Action   MESH LANDON [id number] INGUINAL POLYPR Ricardo Schofield - [id number]  [address] [id number] INGUINAL POLYPR MFIL Melissa Estrella JUDEOL- [id number] N/A 1 Implanted         Drains: * No LDAs found *    Findings: retrorectus mesh placement; modified keyon stoppa repair    Indications for procedure:  46M who presents for elective incisional hernia repair. After discussing the risks and benefits of the procedure he was agreeable to proceed to the OR for open incisional hernia repair, possible mesh. Detailed Description of Procedure: The patient was brought to the operating room and positioned supine on the operating room table. Sequential compression devices were placed on the patient's lower extremities and were powered on. General anesthesia was administered and preoperative antibiotics were administered per the anesthetic record. The patient was prepped and draped in the usual sterile fashion and the procedure went forth with strict aseptic technique under maximal barrier precautions. Immediately prior to the procedure a time-out was called and the surgical checklist was reviewed and agreed upon by all present. A 10 scalpel blade was used to make an incision at the midline and this was carried sharply down to the level of the fascia.  This was lifted and incised with the scalpel. A finger was inserted into the intraperitoneal cavity and cautery was used to to come through the midline. The hernia sac was encountered at the inferior portion of the incision. Once completely opened, the hernia sac was dissected free from the surrounding tissues completely and passed off the field for pathologic examination. Then we freed the rectus muscle from the posterior fascia using cautery bilaterally. The anterior sheath was freed from the rectus muscle anteriorly minimally with cautery. Then the posterior fascia was approximated using 1-0 stratafix. Our mesh was placed in a retrorectus muscle fashion, then the anterior sheath was approximated with 1-0 stratafix. The subcutaneous tissues were irrigated. In layers the subcutaneous tissues were approximated with vicryl and the skin was approximated with monocryl in a subcuticular fashion. Counts were correct x2. The patient was extubated and brought to the PACU in stable condition. Dr. Mirna Painting was present for this procedure.     Electronically signed by Hanane Beck MD on 5/13/2022 at 5:29 PM

## 2022-05-14 LAB
INR BLD: 1
METER GLUCOSE: 114 MG/DL (ref 74–99)
METER GLUCOSE: 114 MG/DL (ref 74–99)
METER GLUCOSE: 145 MG/DL (ref 74–99)
METER GLUCOSE: 157 MG/DL (ref 74–99)
PROTHROMBIN TIME: 10.8 SEC (ref 9.3–12.4)

## 2022-05-14 PROCEDURE — 36415 COLL VENOUS BLD VENIPUNCTURE: CPT

## 2022-05-14 PROCEDURE — 96372 THER/PROPH/DIAG INJ SC/IM: CPT

## 2022-05-14 PROCEDURE — 85610 PROTHROMBIN TIME: CPT

## 2022-05-14 PROCEDURE — 96374 THER/PROPH/DIAG INJ IV PUSH: CPT

## 2022-05-14 PROCEDURE — 96376 TX/PRO/DX INJ SAME DRUG ADON: CPT

## 2022-05-14 PROCEDURE — 6370000000 HC RX 637 (ALT 250 FOR IP): Performed by: STUDENT IN AN ORGANIZED HEALTH CARE EDUCATION/TRAINING PROGRAM

## 2022-05-14 PROCEDURE — 2580000003 HC RX 258: Performed by: STUDENT IN AN ORGANIZED HEALTH CARE EDUCATION/TRAINING PROGRAM

## 2022-05-14 PROCEDURE — S5553 INSULIN LONG ACTING 5 U: HCPCS | Performed by: STUDENT IN AN ORGANIZED HEALTH CARE EDUCATION/TRAINING PROGRAM

## 2022-05-14 PROCEDURE — 99024 POSTOP FOLLOW-UP VISIT: CPT | Performed by: SURGERY

## 2022-05-14 PROCEDURE — 82962 GLUCOSE BLOOD TEST: CPT

## 2022-05-14 PROCEDURE — 6360000002 HC RX W HCPCS: Performed by: STUDENT IN AN ORGANIZED HEALTH CARE EDUCATION/TRAINING PROGRAM

## 2022-05-14 PROCEDURE — G0378 HOSPITAL OBSERVATION PER HR: HCPCS

## 2022-05-14 RX ORDER — KETOROLAC TROMETHAMINE 30 MG/ML
15 INJECTION, SOLUTION INTRAMUSCULAR; INTRAVENOUS EVERY 6 HOURS
Status: DISCONTINUED | OUTPATIENT
Start: 2022-05-14 | End: 2022-05-15 | Stop reason: HOSPADM

## 2022-05-14 RX ORDER — WARFARIN SODIUM 7.5 MG/1
7.5 TABLET ORAL
Status: COMPLETED | OUTPATIENT
Start: 2022-05-14 | End: 2022-05-14

## 2022-05-14 RX ORDER — SENNA AND DOCUSATE SODIUM 50; 8.6 MG/1; MG/1
1 TABLET, FILM COATED ORAL 2 TIMES DAILY
Qty: 30 TABLET | Refills: 2 | Status: ON HOLD | OUTPATIENT
Start: 2022-05-14 | End: 2022-10-21 | Stop reason: HOSPADM

## 2022-05-14 RX ORDER — POLYETHYLENE GLYCOL 3350 17 G/17G
17 POWDER, FOR SOLUTION ORAL DAILY
Qty: 527 G | Refills: 1 | Status: SHIPPED | OUTPATIENT
Start: 2022-05-15 | End: 2022-06-14

## 2022-05-14 RX ORDER — GABAPENTIN 100 MG/1
100 CAPSULE ORAL EVERY 8 HOURS
Qty: 90 CAPSULE | Refills: 0 | Status: SHIPPED | OUTPATIENT
Start: 2022-05-14 | End: 2022-06-02 | Stop reason: SDUPTHER

## 2022-05-14 RX ORDER — OXYCODONE HYDROCHLORIDE 5 MG/1
5 TABLET ORAL EVERY 6 HOURS PRN
Qty: 28 TABLET | Refills: 0 | Status: SHIPPED | OUTPATIENT
Start: 2022-05-14 | End: 2022-05-21

## 2022-05-14 RX ORDER — LISINOPRIL 5 MG/1
5 TABLET ORAL NIGHTLY
Status: DISCONTINUED | OUTPATIENT
Start: 2022-05-14 | End: 2022-05-15 | Stop reason: HOSPADM

## 2022-05-14 RX ORDER — METHOCARBAMOL 750 MG/1
1500 TABLET, FILM COATED ORAL 4 TIMES DAILY
Qty: 80 TABLET | Refills: 0 | Status: SHIPPED | OUTPATIENT
Start: 2022-05-14 | End: 2022-05-24

## 2022-05-14 RX ORDER — ASPIRIN 81 MG/1
81 TABLET, CHEWABLE ORAL DAILY
Qty: 30 TABLET | Refills: 3 | Status: SHIPPED | OUTPATIENT
Start: 2022-05-15

## 2022-05-14 RX ADMIN — METOPROLOL SUCCINATE 25 MG: 50 TABLET, EXTENDED RELEASE ORAL at 08:33

## 2022-05-14 RX ADMIN — POLYETHYLENE GLYCOL 3350 17 G: 17 POWDER, FOR SOLUTION ORAL at 08:37

## 2022-05-14 RX ADMIN — INSULIN LISPRO 2 UNITS: 100 INJECTION, SOLUTION INTRAVENOUS; SUBCUTANEOUS at 17:43

## 2022-05-14 RX ADMIN — OXYCODONE HYDROCHLORIDE 10 MG: 10 TABLET ORAL at 12:35

## 2022-05-14 RX ADMIN — INSULIN GLARGINE-YFGN 25 UNITS: 100 INJECTION, SOLUTION SUBCUTANEOUS at 20:47

## 2022-05-14 RX ADMIN — LISINOPRIL 5 MG: 5 TABLET ORAL at 20:46

## 2022-05-14 RX ADMIN — MAGNESIUM HYDROXIDE 30 ML: 1200 LIQUID ORAL at 08:42

## 2022-05-14 RX ADMIN — KETOROLAC TROMETHAMINE 15 MG: 30 INJECTION, SOLUTION INTRAMUSCULAR; INTRAVENOUS at 20:47

## 2022-05-14 RX ADMIN — OXYCODONE HYDROCHLORIDE 10 MG: 10 TABLET ORAL at 04:03

## 2022-05-14 RX ADMIN — WARFARIN SODIUM 7.5 MG: 7.5 TABLET ORAL at 17:42

## 2022-05-14 RX ADMIN — ACETAMINOPHEN 650 MG: 325 TABLET ORAL at 12:34

## 2022-05-14 RX ADMIN — SODIUM CHLORIDE, PRESERVATIVE FREE 10 ML: 5 INJECTION INTRAVENOUS at 20:47

## 2022-05-14 RX ADMIN — OXYCODONE HYDROCHLORIDE 10 MG: 10 TABLET ORAL at 08:35

## 2022-05-14 RX ADMIN — KETOROLAC TROMETHAMINE 15 MG: 30 INJECTION, SOLUTION INTRAMUSCULAR; INTRAVENOUS at 16:03

## 2022-05-14 RX ADMIN — ACETAMINOPHEN 650 MG: 325 TABLET ORAL at 17:42

## 2022-05-14 RX ADMIN — ENOXAPARIN SODIUM 40 MG: 100 INJECTION SUBCUTANEOUS at 08:36

## 2022-05-14 RX ADMIN — OXYCODONE HYDROCHLORIDE 10 MG: 10 TABLET ORAL at 16:49

## 2022-05-14 RX ADMIN — GABAPENTIN 100 MG: 100 CAPSULE ORAL at 17:42

## 2022-05-14 RX ADMIN — OXYCODONE HYDROCHLORIDE 10 MG: 10 TABLET ORAL at 20:46

## 2022-05-14 RX ADMIN — METHOCARBAMOL TABLETS 1500 MG: 750 TABLET, COATED ORAL at 20:47

## 2022-05-14 RX ADMIN — SENNOSIDES AND DOCUSATE SODIUM 1 TABLET: 50; 8.6 TABLET ORAL at 08:33

## 2022-05-14 RX ADMIN — METHOCARBAMOL TABLETS 1500 MG: 750 TABLET, COATED ORAL at 08:36

## 2022-05-14 RX ADMIN — ASPIRIN 81 MG 81 MG: 81 TABLET ORAL at 08:33

## 2022-05-14 RX ADMIN — SENNOSIDES AND DOCUSATE SODIUM 1 TABLET: 50; 8.6 TABLET ORAL at 20:47

## 2022-05-14 RX ADMIN — METHOCARBAMOL TABLETS 1500 MG: 750 TABLET, COATED ORAL at 12:35

## 2022-05-14 RX ADMIN — KETOROLAC TROMETHAMINE 15 MG: 30 INJECTION, SOLUTION INTRAMUSCULAR; INTRAVENOUS at 09:16

## 2022-05-14 RX ADMIN — GABAPENTIN 100 MG: 100 CAPSULE ORAL at 09:34

## 2022-05-14 RX ADMIN — METHOCARBAMOL TABLETS 1500 MG: 750 TABLET, COATED ORAL at 17:42

## 2022-05-14 RX ADMIN — GABAPENTIN 100 MG: 100 CAPSULE ORAL at 00:53

## 2022-05-14 ASSESSMENT — PAIN DESCRIPTION - DESCRIPTORS
DESCRIPTORS: ACHING;SORE;DISCOMFORT
DESCRIPTORS: ACHING;DISCOMFORT;SORE

## 2022-05-14 ASSESSMENT — PAIN DESCRIPTION - PAIN TYPE: TYPE: ACUTE PAIN;SURGICAL PAIN

## 2022-05-14 ASSESSMENT — PAIN DESCRIPTION - ONSET: ONSET: ON-GOING

## 2022-05-14 ASSESSMENT — PAIN SCALES - GENERAL
PAINLEVEL_OUTOF10: 9
PAINLEVEL_OUTOF10: 8
PAINLEVEL_OUTOF10: 9
PAINLEVEL_OUTOF10: 0
PAINLEVEL_OUTOF10: 9
PAINLEVEL_OUTOF10: 9
PAINLEVEL_OUTOF10: 4

## 2022-05-14 ASSESSMENT — PAIN DESCRIPTION - FREQUENCY: FREQUENCY: CONTINUOUS

## 2022-05-14 ASSESSMENT — PAIN DESCRIPTION - LOCATION
LOCATION: ABDOMEN
LOCATION: ABDOMEN

## 2022-05-14 ASSESSMENT — PAIN DESCRIPTION - ORIENTATION: ORIENTATION: MID

## 2022-05-14 NOTE — PLAN OF CARE

## 2022-05-14 NOTE — PROGRESS NOTES
CC: Postop check    Assessment:    Status post ventral hernia repair 5/12/2022    Plan:  Multimodal analgesia  Discharge home tomorrow  Follow-up with Dr. Tyson Frost in clinic    Subjective:  Complaining of moderately severe postop pain  + BM yesterday  Ambulating with walker    Objective:  General- obese white man. Fairly uncomfortable  Abdomen- abdominal binder secured. Midline suture line intact without drainage or erythema. Surrounding ecchymosis from skin flaps. No hematoma or seroma.

## 2022-05-14 NOTE — DISCHARGE SUMMARY
Physician Discharge Summary     Patient ID:  Domingo Gonzalez  [id number]  48 y.o.  1975    Admit date: 5/12/2022    Discharge date and time: No discharge date for patient encounter. Admitting Physician: Kiki Joya MD     Admission Diagnoses: Hernia of abdominal wall [K43.9]  Incisional hernia of anterior abdominal wall without obstruction or gangrene [K43.2]    Discharge Diagnoses: Principal Problem:    Hernia of abdominal wall  Active Problems:    Pre-operative laboratory examination    Incisional hernia of anterior abdominal wall without obstruction or gangrene  Resolved Problems:    * No resolved hospital problems. *      Admission Condition: good    Discharged Condition: stable    Indication for Admission: Post-op hernia    Hospital Course/Procedures/Operation/treatments:   Patient has a history of incisional hernia that presented to the hospital for elective repair. Patient underwent a exploratory laparotomy with retrorectus mesh placement and modified Edilberto-stopa repair. He was admitted to a general medical floor postoperatively and started on a general diet. Pain has been controlled with oral pain medications and he is having bowel functions. He is working with physical therapy and is stable for discharge home. 5/15: awaiting PT/OT and likely DC later today            Consults:   IP CONSULT TO CASE MANAGEMENT  IP CONSULT TO PHARMACY    Significant Diagnostic Studies:   No results found.     Discharge Exam:  Gen: NAD  Resp: Breathing Comfortably, no resp distress  CV: Reg Rate  Abd: wound CDI  EXT rom wnl vni    Disposition: home    In process/preliminary results:  Outstanding Order Results     Date and Time Order Name Status Description    5/12/2022 12:00 AM Surgical Pathology In process           Patient Instructions:   Current Discharge Medication List           Details   oxyCODONE (ROXICODONE) 5 MG immediate release tablet Take 1 tablet by mouth every 6 hours as needed for Pain for up to 7 days. Qty: 28 tablet, Refills: 0    Comments: Reduce doses taken as pain becomes manageable  Associated Diagnoses: Incisional hernia of anterior abdominal wall without obstruction or gangrene      aspirin 81 MG chewable tablet Take 1 tablet by mouth daily  Qty: 30 tablet, Refills: 3      gabapentin (NEURONTIN) 100 MG capsule Take 1 capsule by mouth every 8 hours for 30 days.   Qty: 90 capsule, Refills: 0      polyethylene glycol (GLYCOLAX) 17 g packet Take 17 g by mouth daily  Qty: 527 g, Refills: 1      sennosides-docusate sodium (SENOKOT-S) 8.6-50 MG tablet Take 1 tablet by mouth 2 times daily  Qty: 30 tablet, Refills: 2      methocarbamol (ROBAXIN) 750 MG tablet Take 2 tablets by mouth 4 times daily for 10 days  Qty: 80 tablet, Refills: 0              Details   loperamide (IMODIUM) 2 MG capsule take 1 capsule by mouth four times a day if needed for diarrhea  Qty: 120 capsule, Refills: 0    Associated Diagnoses: Chronic diarrhea      !! warfarin (COUMADIN) 2.5 MG tablet take as directed  Qty: 90 tablet, Refills: 1      !! warfarin (COUMADIN) 5 MG tablet take 1 tablet by mouth every evening WITH WARFARIN 2.5 MG FOR A TOTAL DOSE OF 7.5 MG DAILY  Qty: 30 tablet, Refills: 1    Associated Diagnoses: History of deep vein thrombosis      insulin glargine (LANTUS) 100 UNIT/ML injection vial Inject 25 Units into the skin nightly  Qty: 10 mL, Refills: 5    Associated Diagnoses: Type 2 diabetes mellitus with hyperglycemia, without long-term current use of insulin ([facility])      Insulin Syringe-Needle U-100 30G X 5/16\" 1 ML MISC 1 each by Does not apply route daily Use as directed  Qty: 100 each, Refills: 5    Associated Diagnoses: Type 2 diabetes mellitus with hyperglycemia, without long-term current use of insulin ([facility])      lisinopril (PRINIVIL;ZESTRIL) 5 MG tablet Take 1 tablet by mouth daily  Qty: 90 tablet, Refills: 1    Associated Diagnoses: Essential hypertension      metoprolol succinate (TOPROL XL) 25 MG extended release tablet Take 1 tablet by mouth daily  Qty: 30 tablet, Refills: 5    Associated Diagnoses: Essential hypertension      Continuous Blood Gluc  (FREESTYLE JOANIE READER) SAMARIA 1 Device by Does not apply route continuous  Qty: 1 each, Refills: 0    Associated Diagnoses: Type 2 diabetes mellitus with hyperglycemia, without long-term current use of insulin ([facility])      Continuous Blood Gluc Sensor (FREESTYLE JOANIE 14 DAY SENSOR) MISC 1 Device by Does not apply route continuous  Qty: 4 each, Refills: 2    Associated Diagnoses: Type 2 diabetes mellitus with hyperglycemia, without long-term current use of insulin ([facility]       ! ! - Potential duplicate medications found. Please discuss with provider. Discharge Instructions for Hernia Repair       Home Care    · Keep the incision area clean and dry, okay to shower and wash incisions after 6:00pm tonight with soap and water, and pat dry. Diet    You will be started on a clear-liquid diet after surgery and advanced to a regular diet, depending on your progress and tolerance. You may notice increased gas or changes in your bowel habits during the first month after surgery. Keep the bowels soft with Metamucil and bran cereal.    Physical Activity    · Walk frequently and it is okay to do stairs but when you are relaxing please have your feet elevated above your buttock's like in a recliner    Medications    · Take Ibuprofen 800mg every 8 hours and Tylenol 500mg every 6 hours for moderate pain. You will start them both at the same time and then they will overlap by 2 hours. Use the Oxycodone for more severe pain. Take the stool softeners if you are taking the oxycodone     Follow-up   Follow up with Dr. Ayush Marie in 2 weeks, please call his office ([phone]) upon discharge to schedule an appointment.      Call Your Doctor If Any of the Following Occurs     · Signs of infection, including fever and chills   · Redness, swelling, increasing pain, excessive bleeding, or discharge at the incision site   · Cough, shortness of breath, chest pain   · Increased abdominal pain   · Nausea and/or vomiting that does not resolve off narcotics. · Pain, burning, urgency or frequency of urination, or blood in the urine   · Pain and/or swelling in your feet, calves, or legs   · Dark urine, light stools, or evidence of jaundice (yellowing of the skin or eyes). In case of an emergency,  CALL 911  immediately.        Follow up:   [address] [facility] MD Sher Garber [place] [address]    Schedule an appointment as soon as possible for a visit in 2 weeks  For routine follow-up       Signed:  Joy Olivarez MD  5/14/2022  8:40 AM

## 2022-05-14 NOTE — PROGRESS NOTES
Pharmacy Consultation Note  (Warfarin Dosing and Monitoring)  Initial consult date: 5/14/22  Consulting Provider: Hawa Jewell MD    Karen Frost II is a 48 y.o. male for whom pharmacy has been asked to manage warfarin therapy. Weight:   Wt Readings from Last 1 Encounters:   05/14/22 (!) 325 lb 11.2 oz (147.7 kg)       TSH:    Lab Results   Component Value Date    TSH 0.965 09/20/2021       Hepatic Function Panel:                            Lab Results   Component Value Date    ALKPHOS 145 10/08/2021    ALT 23 10/08/2021    AST 36 10/08/2021    PROT 7.2 10/08/2021    BILITOT 0.6 10/08/2021    BILIDIR <0.2 09/22/2020    IBILI see below 09/22/2020    LABALBU 4.3 10/08/2021    LABALBU 4.8 05/11/2012       Current warfarin drug-drug interactions include: No significant interactions    Recent Labs     05/12/22  1249   HGB 15.7   *     Date Warfarin Dose INR Heparin or LMWH Comment   5/14 7.5 mg 1 Enoxaparin 40 mg daily                                  Assessment:  · Patient is a 48 y.o. male on warfarin for History of  VTE/PE and IVC occlusion. Patient's home warfarin dosing regimen is 7.5 mg daily. · Goal INR 2 - 3   · Patient s/p open incisional hernia repair with mesh on 5/12, patient instructed to hold warfarin for 5 days prior to procedure per outpatient notes - last dose on 5/6. INR prior to procedure 0.9 on 5/12.    · INR 1 today    Plan:  · Warfarin 7.5 mg x 1 tonight  · Daily PT/INR until the INR is stable within the therapeutic range  · Pharmacist will follow and monitor/adjust dosing as necessary    Thank you for this consult,    Tammy Rivas, LakshmiD   Pharmacy Resident   Phone: [phone]  5/14/2022 8:13 AM

## 2022-05-15 VITALS
WEIGHT: 315 LBS | OXYGEN SATURATION: 94 % | BODY MASS INDEX: 41.75 KG/M2 | TEMPERATURE: 97.2 F | HEART RATE: 83 BPM | RESPIRATION RATE: 18 BRPM | SYSTOLIC BLOOD PRESSURE: 129 MMHG | DIASTOLIC BLOOD PRESSURE: 67 MMHG | HEIGHT: 73 IN

## 2022-05-15 LAB
INR BLD: 1
METER GLUCOSE: 102 MG/DL (ref 74–99)
METER GLUCOSE: 122 MG/DL (ref 74–99)
METER GLUCOSE: 124 MG/DL (ref 74–99)
PROTHROMBIN TIME: 11 SEC (ref 9.3–12.4)

## 2022-05-15 PROCEDURE — 36415 COLL VENOUS BLD VENIPUNCTURE: CPT

## 2022-05-15 PROCEDURE — 96376 TX/PRO/DX INJ SAME DRUG ADON: CPT

## 2022-05-15 PROCEDURE — 6370000000 HC RX 637 (ALT 250 FOR IP): Performed by: STUDENT IN AN ORGANIZED HEALTH CARE EDUCATION/TRAINING PROGRAM

## 2022-05-15 PROCEDURE — 99024 POSTOP FOLLOW-UP VISIT: CPT | Performed by: SURGERY

## 2022-05-15 PROCEDURE — 85610 PROTHROMBIN TIME: CPT

## 2022-05-15 PROCEDURE — 82962 GLUCOSE BLOOD TEST: CPT

## 2022-05-15 PROCEDURE — G0378 HOSPITAL OBSERVATION PER HR: HCPCS

## 2022-05-15 PROCEDURE — 6370000000 HC RX 637 (ALT 250 FOR IP)

## 2022-05-15 PROCEDURE — 2580000003 HC RX 258: Performed by: STUDENT IN AN ORGANIZED HEALTH CARE EDUCATION/TRAINING PROGRAM

## 2022-05-15 PROCEDURE — 6360000002 HC RX W HCPCS: Performed by: STUDENT IN AN ORGANIZED HEALTH CARE EDUCATION/TRAINING PROGRAM

## 2022-05-15 PROCEDURE — 96372 THER/PROPH/DIAG INJ SC/IM: CPT

## 2022-05-15 RX ORDER — WARFARIN SODIUM 10 MG/1
10 TABLET ORAL
Status: COMPLETED | OUTPATIENT
Start: 2022-05-15 | End: 2022-05-15

## 2022-05-15 RX ADMIN — SODIUM CHLORIDE, PRESERVATIVE FREE 10 ML: 5 INJECTION INTRAVENOUS at 08:54

## 2022-05-15 RX ADMIN — OXYCODONE HYDROCHLORIDE 10 MG: 10 TABLET ORAL at 08:53

## 2022-05-15 RX ADMIN — METHOCARBAMOL TABLETS 1500 MG: 750 TABLET, COATED ORAL at 12:54

## 2022-05-15 RX ADMIN — GABAPENTIN 100 MG: 100 CAPSULE ORAL at 08:52

## 2022-05-15 RX ADMIN — GABAPENTIN 100 MG: 100 CAPSULE ORAL at 17:21

## 2022-05-15 RX ADMIN — OXYCODONE HYDROCHLORIDE 10 MG: 10 TABLET ORAL at 00:46

## 2022-05-15 RX ADMIN — METHOCARBAMOL TABLETS 1500 MG: 750 TABLET, COATED ORAL at 08:56

## 2022-05-15 RX ADMIN — WARFARIN SODIUM 10 MG: 10 TABLET ORAL at 17:21

## 2022-05-15 RX ADMIN — ACETAMINOPHEN 650 MG: 325 TABLET ORAL at 12:54

## 2022-05-15 RX ADMIN — ACETAMINOPHEN 650 MG: 325 TABLET ORAL at 17:22

## 2022-05-15 RX ADMIN — ACETAMINOPHEN 650 MG: 325 TABLET ORAL at 06:06

## 2022-05-15 RX ADMIN — METOPROLOL SUCCINATE 25 MG: 50 TABLET, EXTENDED RELEASE ORAL at 08:53

## 2022-05-15 RX ADMIN — KETOROLAC TROMETHAMINE 15 MG: 30 INJECTION, SOLUTION INTRAMUSCULAR; INTRAVENOUS at 08:53

## 2022-05-15 RX ADMIN — KETOROLAC TROMETHAMINE 15 MG: 30 INJECTION, SOLUTION INTRAMUSCULAR; INTRAVENOUS at 02:29

## 2022-05-15 RX ADMIN — SENNOSIDES AND DOCUSATE SODIUM 1 TABLET: 50; 8.6 TABLET ORAL at 08:52

## 2022-05-15 RX ADMIN — OXYCODONE HYDROCHLORIDE 10 MG: 10 TABLET ORAL at 17:21

## 2022-05-15 RX ADMIN — ACETAMINOPHEN 650 MG: 325 TABLET ORAL at 00:46

## 2022-05-15 RX ADMIN — GABAPENTIN 100 MG: 100 CAPSULE ORAL at 02:29

## 2022-05-15 RX ADMIN — OXYCODONE HYDROCHLORIDE 10 MG: 10 TABLET ORAL at 12:55

## 2022-05-15 RX ADMIN — OXYCODONE HYDROCHLORIDE 10 MG: 10 TABLET ORAL at 04:51

## 2022-05-15 RX ADMIN — ASPIRIN 81 MG 81 MG: 81 TABLET ORAL at 08:52

## 2022-05-15 RX ADMIN — METHOCARBAMOL TABLETS 1500 MG: 750 TABLET, COATED ORAL at 17:21

## 2022-05-15 RX ADMIN — ENOXAPARIN SODIUM 40 MG: 100 INJECTION SUBCUTANEOUS at 08:53

## 2022-05-15 RX ADMIN — KETOROLAC TROMETHAMINE 15 MG: 30 INJECTION, SOLUTION INTRAMUSCULAR; INTRAVENOUS at 15:41

## 2022-05-15 ASSESSMENT — PAIN DESCRIPTION - LOCATION: LOCATION: ABDOMEN

## 2022-05-15 ASSESSMENT — PAIN DESCRIPTION - PAIN TYPE: TYPE: SURGICAL PAIN

## 2022-05-15 ASSESSMENT — PAIN SCALES - GENERAL
PAINLEVEL_OUTOF10: 9
PAINLEVEL_OUTOF10: 8
PAINLEVEL_OUTOF10: 9
PAINLEVEL_OUTOF10: 8

## 2022-05-15 ASSESSMENT — PAIN DESCRIPTION - FREQUENCY: FREQUENCY: CONTINUOUS

## 2022-05-15 ASSESSMENT — PAIN DESCRIPTION - ONSET: ONSET: ON-GOING

## 2022-05-15 ASSESSMENT — PAIN - FUNCTIONAL ASSESSMENT: PAIN_FUNCTIONAL_ASSESSMENT: PREVENTS OR INTERFERES SOME ACTIVE ACTIVITIES AND ADLS

## 2022-05-15 ASSESSMENT — PAIN DESCRIPTION - DESCRIPTORS: DESCRIPTORS: ACHING;SORE;DISCOMFORT

## 2022-05-15 ASSESSMENT — PAIN DESCRIPTION - ORIENTATION: ORIENTATION: MID

## 2022-05-15 NOTE — PROGRESS NOTES
Pharmacy Consultation Note  (Warfarin Dosing and Monitoring)  Initial consult date: 5/14/22  Consulting Provider: Ly Metzger MD    Lilliana Ochoa II is a 48 y.o. male for whom pharmacy has been asked to manage warfarin therapy. Weight:   Wt Readings from Last 1 Encounters:   05/15/22 (!) 318 lb 4.8 oz (144.4 kg)       TSH:    Lab Results   Component Value Date    TSH 0.965 09/20/2021       Hepatic Function Panel:                            Lab Results   Component Value Date    ALKPHOS 145 10/08/2021    ALT 23 10/08/2021    AST 36 10/08/2021    PROT 7.2 10/08/2021    BILITOT 0.6 10/08/2021    BILIDIR <0.2 09/22/2020    IBILI see below 09/22/2020    LABALBU 4.3 10/08/2021    LABALBU 4.8 05/11/2012       Current warfarin drug-drug interactions include: No significant interactions    No results for input(s): HGB, PLT in the last 72 hours. Date Warfarin Dose INR Heparin or LMWH Comment   5/14 7.5 mg 1 Enoxaparin 40 mg daily    5/15 10 mg  1 Enoxaparin 40 mg daily                           Assessment:  · Patient is a 48 y.o. male on warfarin for History of  VTE/PE and IVC occlusion. Patient's home warfarin dosing regimen is 7.5 mg daily. · Goal INR 2 - 3   · Patient s/p open incisional hernia repair with mesh on 5/12, patient instructed to hold warfarin for 5 days prior to procedure per outpatient notes - last dose on 5/6. INR prior to procedure 0.9 on 5/12.    · INR 1 today, will give booster dose to try and get closer to therapeutic range    Plan:  · Warfarin 10 mg x 1 tonight for one time booster  · Daily PT/INR until the INR is stable within the therapeutic range  · Pharmacist will follow and monitor/adjust dosing as necessary    Thank you for this consult,    Ulices Abrams, PharmD   Pharmacy Resident   Phone: [phone]  5/15/2022 1:12 PM

## 2022-05-15 NOTE — PLAN OF CARE
Problem: Discharge Planning  Goal: Discharge to home or other facility with appropriate resources  5/15/2022 1725 by Isma Mckeon  Outcome: Completed  5/15/2022 1245 by Isma Mckeon  Outcome: Progressing     Problem: Chronic Conditions and Co-morbidities  Goal: Patient's chronic conditions and co-morbidity symptoms are monitored and maintained or improved  5/15/2022 1725 by Isma Mckeon  Outcome: Completed  5/15/2022 1245 by Isma Mckeon  Outcome: Progressing     Problem: Pain  Goal: Verbalizes/displays adequate comfort level or baseline comfort level  5/15/2022 1725 by Isma Mckeon  Outcome: Completed  5/15/2022 1245 by Isma Mckeon  Outcome: Progressing     Problem: Skin/Tissue Integrity  Goal: Absence of new skin breakdown  Description: 1. Monitor for areas of redness and/or skin breakdown  2. Assess vascular access sites hourly  3. Every 4-6 hours minimum:  Change oxygen saturation probe site  4. Every 4-6 hours:  If on nasal continuous positive airway pressure, respiratory therapy assess nares and determine need for appliance change or resting period.   5/15/2022 1725 by Isma Mckeon  Outcome: Completed  5/15/2022 1245 by Isma Mckeon  Outcome: Progressing     Problem: Safety - Adult  Goal: Free from fall injury  5/15/2022 1725 by Isma Mckeon  Outcome: Completed  5/15/2022 1245 by Isma Mckeon  Outcome: Progressing     Problem: ABCDS Injury Assessment  Goal: Absence of physical injury  5/15/2022 1725 by Isma Mckeon  Outcome: Completed  5/15/2022 1245 by Isma Mckeon  Outcome: Lashell Klein

## 2022-05-15 NOTE — PLAN OF CARE
Problem: Discharge Planning  Goal: Discharge to home or other facility with appropriate resources  5/15/2022 1245 by Alok Lane  Outcome: Progressing     Problem: Chronic Conditions and Co-morbidities  Goal: Patient's chronic conditions and co-morbidity symptoms are monitored and maintained or improved  Outcome: Progressing     Problem: Pain  Goal: Verbalizes/displays adequate comfort level or baseline comfort level  Outcome: Progressing     Problem: Skin/Tissue Integrity  Goal: Absence of new skin breakdown  Description: 1. Monitor for areas of redness and/or skin breakdown  2. Assess vascular access sites hourly  3. Every 4-6 hours minimum:  Change oxygen saturation probe site  4. Every 4-6 hours:  If on nasal continuous positive airway pressure, respiratory therapy assess nares and determine need for appliance change or resting period.   5/15/2022 1245 by Alok Lane  Outcome: Progressing     Problem: Safety - Adult  Goal: Free from fall injury  5/15/2022 1245 by Alok Lane  Outcome: Progressing     Problem: ABCDS Injury Assessment  Goal: Absence of physical injury  5/15/2022 1245 by Alok Lane  Outcome: Elsa Logan

## 2022-05-15 NOTE — PROGRESS NOTES
CC: Postop check    Assessment:    Status post ventral hernia repair 5/12/2022    Plan:  Multimodal analgesia  Discharge home   Follow-up with Dr. Mauricio Olson in clinic    Subjective:  No acute events. No new complaints. Ambulating with walker    Objective:  General- obese white man. Abdomen- abdominal binder secured. Midline suture line intact without drainage or erythema. Surrounding ecchymosis from skin flaps. No hematoma or seroma.

## 2022-05-23 ENCOUNTER — TELEPHONE (OUTPATIENT)
Dept: SURGERY | Age: 47
End: 2022-05-23

## 2022-05-23 ENCOUNTER — OFFICE VISIT (OUTPATIENT)
Dept: PRIMARY CARE CLINIC | Age: 47
End: 2022-05-23
Payer: MEDICARE

## 2022-05-23 VITALS
WEIGHT: 296 LBS | RESPIRATION RATE: 16 BRPM | HEART RATE: 100 BPM | DIASTOLIC BLOOD PRESSURE: 70 MMHG | TEMPERATURE: 98 F | BODY MASS INDEX: 39.05 KG/M2 | SYSTOLIC BLOOD PRESSURE: 130 MMHG

## 2022-05-23 DIAGNOSIS — E11.9 TYPE 2 DIABETES MELLITUS WITHOUT COMPLICATION, WITH LONG-TERM CURRENT USE OF INSULIN (HCC): ICD-10-CM

## 2022-05-23 DIAGNOSIS — I10 ESSENTIAL HYPERTENSION: Primary | ICD-10-CM

## 2022-05-23 DIAGNOSIS — Z79.4 TYPE 2 DIABETES MELLITUS WITHOUT COMPLICATION, WITH LONG-TERM CURRENT USE OF INSULIN (HCC): ICD-10-CM

## 2022-05-23 DIAGNOSIS — Z87.19 HISTORY OF INCISIONAL HERNIA REPAIR: ICD-10-CM

## 2022-05-23 DIAGNOSIS — L76.82 INCISIONAL PAIN: ICD-10-CM

## 2022-05-23 DIAGNOSIS — Z98.890 HISTORY OF INCISIONAL HERNIA REPAIR: ICD-10-CM

## 2022-05-23 DIAGNOSIS — I82.402 RECURRENT ACUTE DEEP VEIN THROMBOSIS (DVT) OF LEFT LOWER EXTREMITY (HCC): ICD-10-CM

## 2022-05-23 LAB
INTERNATIONAL NORMALIZATION RATIO, POC: 3
PROTHROMBIN TIME, POC: 36.5

## 2022-05-23 PROCEDURE — 85610 PROTHROMBIN TIME: CPT | Performed by: PHYSICIAN ASSISTANT

## 2022-05-23 PROCEDURE — 2022F DILAT RTA XM EVC RTNOPTHY: CPT | Performed by: PHYSICIAN ASSISTANT

## 2022-05-23 PROCEDURE — 99214 OFFICE O/P EST MOD 30 MIN: CPT | Performed by: PHYSICIAN ASSISTANT

## 2022-05-23 PROCEDURE — G8427 DOCREV CUR MEDS BY ELIG CLIN: HCPCS | Performed by: PHYSICIAN ASSISTANT

## 2022-05-23 PROCEDURE — 3044F HG A1C LEVEL LT 7.0%: CPT | Performed by: PHYSICIAN ASSISTANT

## 2022-05-23 PROCEDURE — G8417 CALC BMI ABV UP PARAM F/U: HCPCS | Performed by: PHYSICIAN ASSISTANT

## 2022-05-23 PROCEDURE — 4004F PT TOBACCO SCREEN RCVD TLK: CPT | Performed by: PHYSICIAN ASSISTANT

## 2022-05-23 RX ORDER — HYDROCODONE BITARTRATE AND ACETAMINOPHEN 5; 325 MG/1; MG/1
1 TABLET ORAL EVERY 8 HOURS PRN
Qty: 8 TABLET | Refills: 0 | Status: SHIPPED | OUTPATIENT
Start: 2022-05-23 | End: 2022-05-26

## 2022-05-23 NOTE — PROGRESS NOTES
Hypertension:  Patient is here for follow up chronic hypertension. This is  generally controlled on current medication regimen. Takes meds as directed and tolerates them well. Most recent labs reviewed with patient and are not remarkable. No symptoms from htn standpoint per ROS. Patient is  compliant with lifestyle modifications. Patient does not smoke. Comorbid conditions include dm2 and obesity. He had an incisional hernia repair last week. He is doing well, but has some increased pain when he sits for an extended time. He has not yet had a f/u with Dr Valerio Daniels. Controlled Substance Monitoring:    Acute and Chronic Pain Monitoring:   RX Monitoring 5/23/2022   Attestation -   Acute Pain Prescriptions -   Periodic Controlled Substance Monitoring No signs of potential drug abuse or diversion identified. Chronic Pain > 80 MEDD -       Patient's past medical, surgical, social and/or family history reviewed, updated in chart, and are non-contributory (unless otherwise stated). Medications and allergies also reviewed and updated in chart.         Review of Systems:  Constitutional:  No fever, no fatigue, no chills, no headaches, no weight change  Dermatology:  No rash, no mole, no dry or sensitive skin  ENT:  No cough, no sore throat, no sinus pain, no runny nose, no ear pain  Cardiology:  No chest pain, no palpitations, no leg edema, no shortness of breath, no PND  Gastroenterology:  No dysphagia, + abdominal pain, no nausea, no vomiting, no constipation, no diarrhea, no heartburn  Musculoskeletal:  No joint pain, no leg cramps, no back pain, no muscle aches  Respiratory:  No shortness of breath, no orthopnea, no wheezing, no GILLIAM, no hemoptysis  Urology:  No blood in the urine, no urinary frequency, no urinary incontinence, no urinary urgency, no nocturia, no dysuria    Vitals:    05/23/22 1333   BP: 130/70   Pulse: 100   Resp: 16   Temp: 98 °F (36.7 °C)   Weight: 296 lb (134.3 kg)       Physical Exam  Constitutional:       General: He is not in acute distress. Appearance: He is well-developed. HENT:      Head: Normocephalic and atraumatic. Right Ear: External ear normal.      Left Ear: External ear normal.      Nose: Nose normal.   Eyes:      General: No scleral icterus. Conjunctiva/sclera: Conjunctivae normal.      Pupils: Pupils are equal, round, and reactive to light. Neck:      Thyroid: No thyromegaly. Cardiovascular:      Rate and Rhythm: Normal rate and regular rhythm. Heart sounds: Normal heart sounds. No murmur heard. Pulmonary:      Effort: Pulmonary effort is normal. No accessory muscle usage or respiratory distress. Breath sounds: Normal breath sounds. No wheezing. Abdominal:      General: Bowel sounds are normal. There is no distension. Palpations: Abdomen is soft. Tenderness: There is abdominal tenderness (midline surgical incision, no erythema, fluid collection or bleeding). Musculoskeletal:         General: Normal range of motion. Cervical back: Normal range of motion and neck supple. Skin:     General: Skin is warm and dry. Findings: No rash. Neurological:      Mental Status: He is alert and oriented to person, place, and time. Deep Tendon Reflexes: Reflexes are normal and symmetric. Psychiatric:         Speech: Speech normal.         Behavior: Behavior normal.         Assessment/Plan:      Rosmery Cramer was seen today for hypertension. Diagnoses and all orders for this visit:    Essential hypertension  -stable  Recurrent acute deep vein thrombosis (DVT) of left lower extremity (HCC)  -     POCT INR  inr 3, continue 7.5 mg daily     Type 2 diabetes mellitus without complication, with long-term current use of insulin ([facility])  -continue meds the same    History of incisional hernia repair    Incisional pain  -     HYDROcodone-acetaminophen (NORCO) 5-325 MG per tablet;  Take 1 tablet by mouth every 8 hours as needed for Pain for up to 3 days. Intended supply: 3 days. Take lowest dose possible to manage pain      As above. Call or go to ED immediately if symptoms worsen or persist.  Return in about 3 months (around 8/23/2022) for DM2., or sooner if necessary. Educational materials and/or home exercises printed for patient's review and were included in patient instructions on his/her After Visit Summary and given to patient at the end of visit. Counseled regarding above diagnosis, including possible risks and complications,  especially if left uncontrolled. Counseled regarding the possible side effects, risks, benefits and alternatives to treatment; patient and/or guardian verbalizes understanding, agrees, feels comfortable with and wishes to proceed with above treatment plan. Advised patient to call with any new medication issues, and read all Rx info from pharmacy to assure aware of all possible risks and side effects of medication before taking. Reviewed age and gender appropriate health screening exams and vaccinations. Advised patient regarding importance of keeping up with recommended health maintenance and to schedule as soon as possible if overdue, as this is important in assessing for undiagnosed pathology, especially cancer, as well as protecting against potentially harmful/life threatening disease. Patient and/or guardian verbalizes understanding and agrees with above counseling, assessment and plan. All questions answered. Jennifer Levine PA-C  5/23/2022    I have personally reviewed and updated the chief complaint, HPI, Past Medical, Family and Social History, as well as the above Review of Systems.

## 2022-05-23 NOTE — TELEPHONE ENCOUNTER
Patient called and requested more Percocet, sg was 05-18-22. MA checked with Dr Jovanna Wright who stated that no more Percocet, patient needs to be on regimen of Tylenol 500 mg q 6 hours, Ibuprofen 400 mg q 6 hours with food for the next severn days and Robaxin 1500 mg q 6 hours. Patient understood information given.   Electronically signed by Patel Maurice MA on 5/23/2022 at 12:35 PM

## 2022-05-31 ENCOUNTER — OFFICE VISIT (OUTPATIENT)
Dept: SURGERY | Age: 47
End: 2022-05-31

## 2022-05-31 VITALS
BODY MASS INDEX: 40.82 KG/M2 | SYSTOLIC BLOOD PRESSURE: 133 MMHG | TEMPERATURE: 97.3 F | HEART RATE: 92 BPM | DIASTOLIC BLOOD PRESSURE: 71 MMHG | HEIGHT: 73 IN | WEIGHT: 308 LBS | RESPIRATION RATE: 20 BRPM

## 2022-05-31 DIAGNOSIS — Z48.89 ENCOUNTER FOR POSTOPERATIVE CARE: Primary | ICD-10-CM

## 2022-05-31 PROCEDURE — 99024 POSTOP FOLLOW-UP VISIT: CPT | Performed by: SURGERY

## 2022-05-31 NOTE — PROGRESS NOTES
General Surgery Progress Note:    Cc: post op    S: doing well not much pain       Objective:  @Ht 6' 1\" (1.854 m)   Wt (!) 308 lb (139.7 kg)   BMI 40.64 kg/m² @    Physical -     Gen: NAD  Resp: Breathing Comfortably, no resp distress  CV: Reg Rate  Abd: wound healing well   EXT nvi    Assessment/Plan: s/p incisional hernia repair with mesh     FU prn, ok for unrestricted activities in 1 month.      Electronically Signed by Jalyn Corral MD FACS   2:53 PM

## 2022-06-01 ENCOUNTER — ANTI-COAG VISIT (OUTPATIENT)
Dept: FAMILY MEDICINE CLINIC | Age: 47
End: 2022-06-01

## 2022-06-01 NOTE — CARE COORDINATION
RT Inhaler-Nebulizer Bronchodilator Protocol Note    There is a bronchodilator order in the chart from a provider indicating to follow the RT Bronchodilator Protocol and there is an Initiate RT Inhaler-Nebulizer Bronchodilator Protocol order as well (see protocol at bottom of note). CXR Findings:  No results found. The findings from the last RT Protocol Assessment were as follows:   History Pulmonary Disease: None or smoker <15 pack years  Respiratory Pattern: Regular pattern and RR 12-20 bpm  Breath Sounds: Clear breath sounds  Cough: Strong, productive  Indication for Bronchodilator Therapy: None  Bronchodilator Assessment Score: 1    Aerosolized bronchodilator medication orders have been revised according to the RT Inhaler-Nebulizer Bronchodilator Protocol below. Respiratory Therapist to perform RT Therapy Protocol Assessment initially then follow the protocol. Repeat RT Therapy Protocol Assessment PRN for score 0-3 or on second treatment, BID, and PRN for scores above 3. No Indications - adjust the frequency to every 6 hours PRN wheezing or bronchospasm, if no treatments needed after 48 hours then discontinue using Per Protocol order mode. If indication present, adjust the RT bronchodilator orders based on the Bronchodilator Assessment Score as indicated below. Use Inhaler orders unless patient has one or more of the following: on home nebulizer, not able to hold breath for 10 seconds, is not alert and oriented, cannot activate and use MDI correctly, or respiratory rate 25 breaths per minute or more, then use the equivalent nebulizer order(s) with same Frequency and PRN reasons based on the score. If a patient is on this medication at home then do not decrease Frequency below that used at home. 0-3 - enter or revise RT bronchodilator order(s) to equivalent RT Bronchodilator order with Frequency of every 4 hours PRN for wheezing or increased work of breathing using Per Protocol order mode. Spoke with Sana)  They are able to take pt back. Pt now has medicaid. Envelope and ambulance form in soft chart. COVID test given to MIGUEL BREWER 4-6 - enter or revise RT Bronchodilator order(s) to two equivalent RT bronchodilator orders with one order with BID Frequency and one order with Frequency of every 4 hours PRN wheezing or increased work of breathing using Per Protocol order mode. 7-10 - enter or revise RT Bronchodilator order(s) to two equivalent RT bronchodilator orders with one order with TID Frequency and one order with Frequency of every 4 hours PRN wheezing or increased work of breathing using Per Protocol order mode. 11-13 - enter or revise RT Bronchodilator order(s) to one equivalent RT bronchodilator order with QID Frequency and an Albuterol order with Frequency of every 4 hours PRN wheezing or increased work of breathing using Per Protocol order mode. Greater than 13 - enter or revise RT Bronchodilator order(s) to one equivalent RT bronchodilator order with every 4 hours Frequency and an Albuterol order with Frequency of every 2 hours PRN wheezing or increased work of breathing using Per Protocol order mode. RT to enter RT Home Evaluation for COPD & MDI Assessment order using Per Protocol order mode. Electronically signed by Mya Sanderson RCP on 5/31/2022 at 9:34 PMRT Nebulizer Bronchodilator Protocol Note    There is a bronchodilator order in the chart from a provider indicating to follow the RT Bronchodilator Protocol and there is an Initiate RT Bronchodilator Protocol order as well (see protocol at bottom of note). CXR Findings:  No results found. The findings from the last RT Protocol Assessment were as follows:  Smoking: None or smoker <15 pack years  Respiratory Pattern: Regular pattern and RR 12-20 bpm  Breath Sounds: Clear breath sounds  Cough: Strong, productive  Indication for Bronchodilator Therapy: None  Bronchodilator Assessment Score: 1    Aerosolized bronchodilator medication orders have been revised according to the RT Nebulizer Bronchodilator Protocol below.     Respiratory Therapist to perform RT Therapy Protocol Assessment initially then follow the protocol. Repeat RT Therapy Protocol Assessment PRN for score 0-3 or on second treatment, BID, and PRN for scores above 3. No Indications - adjust the frequency to every 6 hours PRN wheezing or bronchospasm, if no treatments needed after 48 hours then discontinue using Per Protocol order mode. If indication present, adjust the RT bronchodilator orders based on the Bronchodilator Assessment Score as indicated below. If a patient is on this medication at home then do not decrease Frequency below that used at home. 0-3 - enter or revise RT bronchodilator order(s) to equivalent RT Bronchodilator order with Frequency of every 4 hours PRN for wheezing or increased work of breathing using Per Protocol order mode. 4-6 - enter or revise RT Bronchodilator order(s) to two equivalent RT bronchodilator orders with one order with BID Frequency and one order with Frequency of every 4 hours PRN wheezing or increased work of breathing using Per Protocol order mode. 7-10 - enter or revise RT Bronchodilator order(s) to two equivalent RT bronchodilator orders with one order with TID Frequency and one order with Frequency of every 4 hours PRN wheezing or increased work of breathing using Per Protocol order mode. 11-13 - enter or revise RT Bronchodilator order(s) to one equivalent RT bronchodilator order with QID Frequency and an Albuterol order with Frequency of every 4 hours PRN wheezing or increased work of breathing using Per Protocol order mode. Greater than 13 - enter or revise RT Bronchodilator order(s) to one equivalent RT bronchodilator order with every 4 hours Frequency and an Albuterol order with Frequency of every 2 hours PRN wheezing or increased work of breathing using Per Protocol order mode. RT to enter RT Home Evaluation for COPD & MDI Assessment order using Per Protocol order mode.     Electronically signed by Angelique Hernandez RCP on 5/31/2022 at 9:34 PM

## 2022-06-02 DIAGNOSIS — K52.9 CHRONIC DIARRHEA: ICD-10-CM

## 2022-06-02 RX ORDER — GABAPENTIN 100 MG/1
100 CAPSULE ORAL EVERY 8 HOURS
Qty: 90 CAPSULE | Refills: 0 | Status: SHIPPED
Start: 2022-06-02 | End: 2022-07-11

## 2022-06-02 RX ORDER — LOPERAMIDE HYDROCHLORIDE 2 MG/1
CAPSULE ORAL
Qty: 120 CAPSULE | Refills: 0 | Status: SHIPPED
Start: 2022-06-02 | End: 2022-07-08 | Stop reason: SDUPTHER

## 2022-06-10 DIAGNOSIS — Z86.718 HISTORY OF DEEP VEIN THROMBOSIS: ICD-10-CM

## 2022-06-10 RX ORDER — WARFARIN SODIUM 5 MG/1
TABLET ORAL
Qty: 30 TABLET | Refills: 1 | Status: SHIPPED
Start: 2022-06-10 | End: 2022-08-05

## 2022-07-08 DIAGNOSIS — K52.9 CHRONIC DIARRHEA: ICD-10-CM

## 2022-07-08 RX ORDER — LOPERAMIDE HYDROCHLORIDE 2 MG/1
CAPSULE ORAL
Qty: 120 CAPSULE | Refills: 1 | Status: SHIPPED
Start: 2022-07-08 | End: 2022-09-06

## 2022-07-11 RX ORDER — GABAPENTIN 100 MG/1
100 CAPSULE ORAL EVERY 8 HOURS
Qty: 90 CAPSULE | Refills: 0 | Status: SHIPPED
Start: 2022-07-11 | End: 2022-08-08 | Stop reason: SDUPTHER

## 2022-08-04 DIAGNOSIS — Z86.718 HISTORY OF DEEP VEIN THROMBOSIS: ICD-10-CM

## 2022-08-05 RX ORDER — WARFARIN SODIUM 5 MG/1
TABLET ORAL
Qty: 30 TABLET | Refills: 1 | Status: SHIPPED
Start: 2022-08-05 | End: 2022-09-27

## 2022-08-06 DIAGNOSIS — J30.2 SEASONAL ALLERGIES: ICD-10-CM

## 2022-08-08 RX ORDER — GABAPENTIN 100 MG/1
100 CAPSULE ORAL EVERY 8 HOURS
Qty: 90 CAPSULE | Refills: 0 | Status: SHIPPED
Start: 2022-08-08 | End: 2022-08-19

## 2022-08-08 RX ORDER — FLUTICASONE PROPIONATE 50 MCG
1 SPRAY, SUSPENSION (ML) NASAL NIGHTLY
Qty: 1 EACH | Refills: 1 | Status: SHIPPED
Start: 2022-08-08 | End: 2022-10-11

## 2022-08-09 NOTE — DISCHARGE SUMMARY
[facility]                                     Progress Note    Patient Name: Betty Tamayo  Date:        2022         Service Type: Individual      Session Start Time: 1pm  Session End Time: 1:45pm     Session Length: 45 minutes    Session #: 11    Attendees: Client attended alone    Service Modality:  Video Visit:      Provider verified identity through the following two step process.  Patient provided:  Patient     Telemedicine Visit: The patient's condition can be safely assessed and treated via synchronous audio and visual telemedicine encounter.      Reason for Telemedicine Visit: Services only offered telehealth    Originating Site (Patient Location): Patient's home    Distant Site (Provider Location): [facility]    Consent:  The patient/guardian has verbally consented to: the potential risks and benefits of telemedicine (video visit) versus in person care; bill my insurance or make self-payment for services provided; and responsibility for payment of non-covered services.     Patient would like the video invitation sent by:  My Chart    Mode of Communication:  Video Conference via Amwell    As the provider I attest to compliance with applicable laws and regulations related to telemedicine.    DATA  Interactive Complexity: No  Crisis: No        Progress Since Last Session (Related to Symptoms / Goals / Homework):   Symptoms: emotional dysregulation, panic attacks    Homework: Achieved / completed to satisfaction      Episode of Care Goals: Minimal progress - PREPARATION (Decided to change - considering how); Intervened by negotiating a change plan and determining options / strategies for behavior change, identifying triggers, exploring social supports, and working towards setting a date to begin behavior change     Current / Ongoing Stressors and Concerns:   Client reports she has felt productive this week, she followed  Discharge Summary    Patient ID   Rosa Pérez   1975  [id number]    Primary Care:   IGOR Lundberg CNP    Admit date: 7/31/2020   Discharge date: 8/11/2020    Medical Record number: [id number]   Admitting Physician: Summer Tsang MD   Discharge Physician: Nomi Millard MD    Consultants: ID and hematology/oncology    Procedures:     Discharge Diagnoses:      Patient Active Problem List   Diagnosis Code    Neuropathic pain M79.2    Lumbar spondylosis M47.816    Osteoarthritis M19.90    Insomnia G47.00    Fibromyalgia M79.7    Vitamin D deficiency E55.9    Essential hypertension I10    Allergic rhinitis J30.9    Lumbar radiculopathy M54.16    Osteoarthritis of spine with radiculopathy, lumbar region M47.26    Class 1 obesity in adult E66.9    Lumbar disc herniation M51.26    Type 2 diabetes mellitus ([facility] [address] E11.9    Primary osteoarthritis of left hip M16.12    Primary osteoarthritis of right hip M16.11    Cellulitis of foot L03.119    Neuropathy G62.9    Cellulitis, wound, post-operative T81.49XA    Left leg swelling M79.89    SIRS (systemic inflammatory response syndrome) (HCC) R65.10    Cellulitis of sacral region L03.319    Rectus diastasis M62.08    Recurrent unilateral inguinal hernia K40.91    Enterocolitis K52.9    Decubitus ulcer of sacral area L89.159    Abnormal findings on diagnostic imaging of gall bladder R93.2    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    Cyst of spleen D73.4    Splenic abscess D73.3    Anemia D64.9    Pulmonary embolism (HCC) I26.99    Thrombocytosis [facility]) D47.3         Hospital Course: Mr. Melvin Ricci is a 64-year-old male presented to the emergency department from a nursing facility due to abscess of the spleen and abdominal pain. He has a past medical history of a splenic infarction treated at [facility].   He had a sacral decubitus that was treated by infectious disease with IV antibiotics and was healing was seen by also hematology due to thrombocytosis who monitored CBC  . At this time patient improved with medical management and is stable to be discharged to [address] [place] [address]  Patient denied chest pain shortness of breath abdominal pain no nausea vomiting at this time he is eating and drinking without any vomiting.     Physical Exam:   /74   Pulse 120   Temp 97.7 °F (36.5 °C) (Tympanic)   Resp 20   Ht 6' 1\" (1.854 m)   Wt 239 lb 4 oz (108.5 kg)   SpO2 97%   BMI 31.57 kg/m²   Neck: no JVD  Lungs: equal BS, clear   CV: RRR, normal S1S2, no significant murmur  Abdomen: soft, nontender, normally active BS, no masses or tenderness  Extremities: no edema or cords  Neurologic: alert, oriented, no focal CN or motor deficit        Medications: see computerized discharge medication list     Medication List      CONTINUE taking these medications    aspirin 81 MG EC tablet  Commonly known as:  SM Aspirin Adult Low Strength  take 1 tablet by mouth once daily     cyclobenzaprine 10 MG tablet  Commonly known as:  FLEXERIL  take 1 tablet by mouth three times a day for if needed for muscle spasm     DULoxetine 30 MG extended release capsule  Commonly known as:  CYMBALTA  Take 1 capsule by mouth daily     enoxaparin 120 MG/0.8ML injection  Commonly known as:  LOVENOX     fenofibrate 54 MG tablet  Commonly known as:  TRICOR  take 1 tablet by mouth once daily     fluticasone 50 MCG/ACT nasal spray  Commonly known as:  FLONASE  instill 2 sprays into each nostril once daily     FreeStyle Robert Sensor System Misc     HumaLOG 100 UNIT/ML injection cartridge  Generic drug:  insulin lispro     ibuprofen 600 MG tablet  Commonly known as:  ADVIL;MOTRIN  take 1 tablet by mouth four times a day if needed for pain     Lantus 100 UNIT/ML injection vial  Generic drug:  insulin glargine     lisinopril 20 MG tablet  Commonly known as:  PRINIVIL;ZESTRIL  take 1 tablet by mouth once daily     Loperamide A-D 2 MG tablet  Generic drug:  loperamide     Melatin 3-1 through with connecting to her doctor and getting a couples counseling appointment set up. Further discussed grounding skills for dysregulation and panic, client going to work on tracking her emotions daily.     Plan for panic attack:    Focus on calming down body, you're brain cannot problem solve until your body is calm    - Use cold or an ice pack on face, while slowing down and holding breath  - Continue slowing down your breath while tensing and relaxing muscles  * Do these things in a calm safe space     Treatment Objective(s) Addressed in This Session:   Safety assessing/planning     Intervention:   Safety assessing; treatment/crisis planning   DBT: Tracking emotions; STOP skills; Grounding strategies, TIP skills using PMR    Assessments completed prior to visit:  The following assessments were completed by patient for this visit:  PHQ9:   PHQ-9 SCORE 1/27/2022 5/2/2022 6/14/2022 6/21/2022 6/28/2022 8/2/2022 8/9/2022   PHQ-9 Total Score MyChart 23 (Severe depression) 20 (Severe depression) 21 (Severe depression) 18 (Moderately severe depression) 20 (Severe depression) 20 (Severe depression) 12 (Moderate depression)   PHQ-9 Total Score - 20 21 18 20 20 12   Some encounter information is confidential and restricted. Go to Review Flowsheets activity to see all data.     GAD7:   NIXON-7 SCORE 1/27/2022 5/2/2022 6/14/2022 6/14/2022 6/28/2022 8/2/2022 8/2/2022   Total Score 21 (severe anxiety) 19 (severe anxiety) - 21 (severe anxiety) 21 (severe anxiety) - 21 (severe anxiety)   Total Score - 19 21 21 21 21 21   Some encounter information is confidential and restricted. Go to Review Flowsheets activity to see all data.         ASSESSMENT: Current Emotional / Mental Status (status of significant symptoms):   Risk status (Self / Other harm or suicidal ideation)   Patient denies current fears or concerns for personal safety.   Patient denies current or recent suicidal ideation or behaviors.- Does report feeling hopeless  MG Tabs  Generic drug:  Melatonin-Pyridoxine     nystatin 870730 UNIT/GM cream  Commonly known as:  MYCOSTATIN     oxyCODONE-acetaminophen 5-325 MG per tablet  Commonly known as:  PERCOCET     pantoprazole 40 MG tablet  Commonly known as:  PROTONIX     pravastatin 20 MG tablet  Commonly known as:  PRAVACHOL  Take 1 tablet by mouth nightly     pregabalin 300 MG capsule  Commonly known as:  LYRICA  take 1 capsule by mouth twice a day     Vitamin D3 1.25 MG (82469 UT) Caps        ASK your doctor about these medications    Zosyn 4.5 (4-0.5) g injection  Generic drug:  piperacillin-tazobactam          Patient Instructions: Resume home medications any changes while in hospital      Discharged Condition: good  Disposition: home  Activity: activity as tolerated  Diet: regular diet  Wound Care: none needed    Follow-up: [facility] in 1-2 weeks        Electronically signed by Campbell Murphy MD on 8/11/2020 at 3:04 PM  [facility] [place] [facility]   Time spent on discharge 45  minutes and helpless   Patient denies current or recent homicidal ideation or behaviors.   Patient denies current or recent self injurious behavior or ideation.   Patient denies other safety concerns.   Patient reports there has been no change in risk factors since their last session.     Patient reports there has been no change in protective factors since their last session.     A safety and risk management plan has been developed including: Patient consented to co-developed safety plan.  Safety and risk management plan was completed.  Patient agreed to use safety plan should any safety concerns arise.  A copy was given to the patient.     Appearance:   Appropriate    Eye Contact:   Fair    Psychomotor Behavior: Normal    Attitude:   Cooperative    Orientation:   All   Speech    Rate / Production: Pressured     Volume:  Normal    Mood:    Irritable  Agitated   Affect:    Labile    Thought Content:  Clear    Thought Form:  Coherent    Insight:    Fair      Medication Review:   No changes to current psychiatric medication(s)     Medication Compliance:   Yes     Changes in Health Issues:   None reported     Chemical Use Review:   Substance Use: Chemical use reviewed, no active concerns identified      Tobacco Use: No current tobacco use.      Diagnosis:  1. Major depressive disorder, recurrent episode, moderate (H)    2. Generalized anxiety disorder    3. PTSD (post-traumatic stress disorder)        Collateral Reports Completed:   Routed note to PCP    PLAN: (Patient Tasks / Therapist Tasks / Other)  Client will utilize crisis plan as needed; use daily BA schedule daily before next appointment; track emotions and dysregulation daily        BIANKA Almanza, LICSW     8/9/2022                                                         ______________________________________________________________________                                                Individual Treatment Plan    Patient's Name: Betty Tamayo  Date Of  Birth: 1992    Date of Creation: 5/27/2022  Date Treatment Plan Last Reviewed/Revised:     DSM5 Diagnoses: 296.32 (F33.1) Major Depressive Disorder, Recurrent Episode, Moderate _, 300.02 (F41.1) Generalized Anxiety Disorder or 309.81 (F43.10) Posttraumatic Stress Disorder (includes Posttraumatic Stress Disorder for Children 6 Years and Younger)  With dissociative symptoms  Psychosocial / Contextual Factors: interpersonal relationship stress  PROMIS (reviewed every 90 days):     Referral / Collaboration:  Referral to another professional/service is not indicated at this time..    Anticipated number of session for this episode of care: 50  Anticipation frequency of session: Weekly  Anticipated Duration of each session: 38-52 minutes  Treatment plan will be reviewed in 90 days or when goals have been changed.       MeasurableTreatment Goal(s) related to diagnosis / functional impairment(s)  Goal 1: Client will report increased ability to regulate emotions.      Objective #A (Client Action)    Status: New - Date: 5/27/2022      Client will learn & utilize at least 1-2 emotion regulation skills per week including observing and describing emotions; learning functions of emotions; check the facts; opposite action; self soothe; distract; IMPROVE the moment; and skills to decrease emotional vulnerability.      Intervention(s)  Therapist will teach assertiveness and interpersonal effectiveness skills.    Goal 2: Client will experience decreased symptoms of PTSD and increased sense of self and individuation         Objective #A (Client Action)        Client will identify grounding strategies for managing hyper and hypo arousal symptoms.    Client will identify trauma triggers    Client will process trauma in a safe and adaptive way    Client will demonstrate increased ability to regulate emotions and increased interpersonal effectiveness           Status: New - Date: 5/27/2022      Intervention(s)    Therapist will teach  "DBT strategies for grounding and regulating emotions; will create a safe space to process trauma and help client establish sense of safety and sense of self.        Patient has reviewed and agreed to the above plan.      BIANKA Almanza, [facility]  May 27, 2022        [facility]                                       Betty Tamayo     SAFETY PLAN:  Step 1: Warning signs / cues (Thoughts, images, mood, situation, behavior) that a crisis may be developing:    Thoughts: \"I don't matter\", \"I'm a burden\", \"I can't do this anymore\" and \"I just want this to end\"    Images: obsessive thoughts of death or dying: picturing bad things happening or picturing dying by getting shot in a shooting. Might picture getting in a car accident. Pictures children choking or getting hurt.    Thinking Processes: ruminations (can't stop thinking about my problems): ., racing thoughts and intrusive thoughts (bothersome, unwanted thoughts that come out of nowhere): .    Mood: worsening depression, hopelessness, helplessness, intense anger, intense worry, agitation and mood swings    Behaviors: isolating/withdrawing , using drugs, using alcohol, can't stop crying, impulsive, reckless behaviors (acting without thinking): ., aggression, not taking care of myself and not taking care of my responsibilities    Situations: anniversary of deaths and birthdays of loved ones who pass, relationship problems and trauma    Step 2: Coping strategies - Things I can do to take my mind off of my problems without contacting another person (relaxation technique, physical activity):    Distress Tolerance Strategies:  listen to positive and upbeat music: ., change body temperature (ice pack/cold water)  and games on phone    Physical Activities: go for a walk and yoga    Focus on helpful thoughts:  \"I will get through this\" and \"It always passes\"  Step 3: People and social settings that provide distraction:   Name: Friend- Abigail    Name: Sister- " Everardo   Name: Pasquale's mom- Kelsy     park and swimming, walking trail   Step 4: Remind myself of people and things that are important to me and worth living for:  My kids: Paco, Vane, Gilberto. My family.  Step 5: When I am in crisis, I can ask these people to help me use my safety plan:   Name: Sister- Eva    Step 6: Making the environment safe:     be around others  Step 7: Professionals or agencies I can contact during a crisis:    Suicide Prevention Lifeline: 7-017-808-ZBWX (8718)    Crisis Text Line Service (available 24 hours a day, 7 days a week): Text [place] to 095575    Local Crisis Services: Recommend client go to [facility] for emergency services    Call 911 or go to my nearest emergency department.   I helped develop this safety plan and agree to use it when needed.  I have been given a copy of this plan.      Client signature _________________________________________________________________  Today s date:  7/5/2022  Completed by Provider Name/ Credentials:  BIANKA Almanza, [facility]  7/5/2022  Adapted from Safety Plan Template 2008 Anila Santoyo and Dakota German is reprinted with the express permission of the authors.  No portion of the Safety Plan Template may be reproduced without the express, written permission.  You can contact the authors at [email or web address] or [email or web address].

## 2022-08-12 DIAGNOSIS — E11.65 TYPE 2 DIABETES MELLITUS WITH HYPERGLYCEMIA, WITHOUT LONG-TERM CURRENT USE OF INSULIN (HCC): ICD-10-CM

## 2022-08-12 RX ORDER — INSULIN GLARGINE 100 [IU]/ML
25 INJECTION, SOLUTION SUBCUTANEOUS NIGHTLY
Qty: 10 ML | Refills: 5 | Status: ON HOLD
Start: 2022-08-12 | End: 2022-10-21 | Stop reason: HOSPADM

## 2022-08-19 ENCOUNTER — OFFICE VISIT (OUTPATIENT)
Dept: PRIMARY CARE CLINIC | Age: 47
End: 2022-08-19
Payer: MEDICARE

## 2022-08-19 VITALS
BODY MASS INDEX: 39.1 KG/M2 | DIASTOLIC BLOOD PRESSURE: 80 MMHG | RESPIRATION RATE: 18 BRPM | HEIGHT: 73 IN | OXYGEN SATURATION: 97 % | TEMPERATURE: 97.2 F | SYSTOLIC BLOOD PRESSURE: 138 MMHG | HEART RATE: 88 BPM | WEIGHT: 295 LBS

## 2022-08-19 DIAGNOSIS — E11.9 TYPE 2 DIABETES MELLITUS WITHOUT COMPLICATION, WITH LONG-TERM CURRENT USE OF INSULIN (HCC): Primary | ICD-10-CM

## 2022-08-19 DIAGNOSIS — Z79.4 TYPE 2 DIABETES MELLITUS WITHOUT COMPLICATION, WITH LONG-TERM CURRENT USE OF INSULIN (HCC): Primary | ICD-10-CM

## 2022-08-19 DIAGNOSIS — E11.9 TYPE 2 DIABETES MELLITUS WITHOUT COMPLICATION, WITH LONG-TERM CURRENT USE OF INSULIN (HCC): ICD-10-CM

## 2022-08-19 DIAGNOSIS — I10 ESSENTIAL HYPERTENSION: ICD-10-CM

## 2022-08-19 DIAGNOSIS — I82.402 RECURRENT ACUTE DEEP VEIN THROMBOSIS (DVT) OF LEFT LOWER EXTREMITY (HCC): ICD-10-CM

## 2022-08-19 DIAGNOSIS — Z79.4 TYPE 2 DIABETES MELLITUS WITHOUT COMPLICATION, WITH LONG-TERM CURRENT USE OF INSULIN (HCC): ICD-10-CM

## 2022-08-19 DIAGNOSIS — G62.9 PERIPHERAL POLYNEUROPATHY: ICD-10-CM

## 2022-08-19 PROBLEM — I82.409 ACUTE DEEP VEIN THROMBOSIS (DVT) (HCC): Status: RESOLVED | Noted: 2020-10-07 | Resolved: 2022-08-19

## 2022-08-19 LAB
CHOLESTEROL, TOTAL: 197 MG/DL (ref 0–199)
HBA1C MFR BLD: 6.1 %
HDLC SERPL-MCNC: 41 MG/DL
INTERNATIONAL NORMALIZATION RATIO, POC: 3
LDL CHOLESTEROL CALCULATED: 116 MG/DL (ref 0–99)
PROTHROMBIN TIME, POC: 35.6
TRIGL SERPL-MCNC: 202 MG/DL (ref 0–149)
VLDLC SERPL CALC-MCNC: 40 MG/DL

## 2022-08-19 PROCEDURE — 85610 PROTHROMBIN TIME: CPT | Performed by: PHYSICIAN ASSISTANT

## 2022-08-19 PROCEDURE — 2022F DILAT RTA XM EVC RTNOPTHY: CPT | Performed by: PHYSICIAN ASSISTANT

## 2022-08-19 PROCEDURE — 3044F HG A1C LEVEL LT 7.0%: CPT | Performed by: PHYSICIAN ASSISTANT

## 2022-08-19 PROCEDURE — G8417 CALC BMI ABV UP PARAM F/U: HCPCS | Performed by: PHYSICIAN ASSISTANT

## 2022-08-19 PROCEDURE — 83036 HEMOGLOBIN GLYCOSYLATED A1C: CPT | Performed by: PHYSICIAN ASSISTANT

## 2022-08-19 PROCEDURE — G8427 DOCREV CUR MEDS BY ELIG CLIN: HCPCS | Performed by: PHYSICIAN ASSISTANT

## 2022-08-19 PROCEDURE — 4004F PT TOBACCO SCREEN RCVD TLK: CPT | Performed by: PHYSICIAN ASSISTANT

## 2022-08-19 PROCEDURE — 99214 OFFICE O/P EST MOD 30 MIN: CPT | Performed by: PHYSICIAN ASSISTANT

## 2022-08-19 RX ORDER — FLASH GLUCOSE SENSOR
1 KIT MISCELLANEOUS CONTINUOUS
Qty: 4 EACH | Refills: 2 | Status: SHIPPED
Start: 2022-08-19 | End: 2022-08-22 | Stop reason: SDUPTHER

## 2022-08-19 RX ORDER — GABAPENTIN 300 MG/1
300 CAPSULE ORAL 3 TIMES DAILY
Qty: 90 CAPSULE | Refills: 2 | Status: SHIPPED
Start: 2022-08-19 | End: 2022-11-02 | Stop reason: SDUPTHER

## 2022-08-19 NOTE — PROGRESS NOTES
DM2:   Patient is here to fu regarding DM2. Patient is  controlled. Taking all medications and tolerating well. Fasting sugars are running \"insurance wont cover my sensor. \"   Patient is taking ASA and Ace Inhibitor/ARB. Patient is not on appropriately-dosed statin. LDL is  at goal.  BP is  controlled. No hypoglycemic episodes. Patient is aware that it is necessary to see an Eye Dr yearly. Patient does not smoke. Most recent labs reviewed with patient. Lab Results   Component Value Date    LABA1C 6.0 02/14/2022       Lab Results   Component Value Date    [address] 70 09/20/2021        Hypertension:  Patient is here for follow up chronic hypertension. This is  generally controlled on current medication regimen. Takes meds as directed and tolerates them well. Most recent labs reviewed with patient and are not remarkable. No symptoms from htn standpoint per ROS. Patient is  compliant with lifestyle modifications. Patient believes that his Pravachol was d/c bc of the coumadin. Discussed there is not generally an interaction. He states the hospital d/c. When looking in the hospital discharge, it states that he stopped it and he should continue. He would like to increase his gabapentin. Patient's past medical, surgical, social and/or family history reviewed, updated in chart, and are non-contributory (unless otherwise stated). Medications and allergies also reviewed and updated in chart.         Review of Systems:  Constitutional:  No fever, no fatigue, no chills, no headaches, no weight change  Dermatology:  No rash, no mole, no dry or sensitive skin  ENT:  No cough, no sore throat, no sinus pain, no runny nose, no ear pain  Cardiology:  No chest pain, no palpitations, no leg edema, no shortness of breath, no PND  Gastroenterology:  No dysphagia, no abdominal pain, no nausea, no vomiting, no constipation, no diarrhea, no heartburn  Musculoskeletal:  No joint pain, no leg cramps, no back pain, no muscle aches  Respiratory:  No shortness of breath, no orthopnea, no wheezing, no GILLIAM, no hemoptysis  Urology:  No blood in the urine, no urinary frequency, no urinary incontinence, no urinary urgency, no nocturia, no dysuria      Vitals:    08/19/22 1244   BP: 138/80   Pulse: 88   Resp: 18   Temp: 97.2 °F (36.2 °C)   SpO2: 97%   Weight: 295 lb (133.8 kg)   Height: 6' 1\" (1.854 m)       Physical Exam  Constitutional:       General: He is not in acute distress. Appearance: Normal appearance. He is well-developed. HENT:      Head: Normocephalic and atraumatic. Right Ear: External ear normal.      Left Ear: External ear normal.      Nose: Nose normal.   Eyes:      General: No scleral icterus. Conjunctiva/sclera: Conjunctivae normal.      Pupils: Pupils are equal, round, and reactive to light. Neck:      Thyroid: No thyromegaly. Cardiovascular:      Rate and Rhythm: Normal rate and regular rhythm. Heart sounds: Normal heart sounds. No murmur heard. Pulmonary:      Effort: Pulmonary effort is normal. No accessory muscle usage or respiratory distress. Breath sounds: Normal breath sounds. No wheezing. Musculoskeletal:         General: Normal range of motion. Cervical back: Normal range of motion and neck supple. Right lower leg: No edema. Left lower leg: No edema. Skin:     General: Skin is warm and dry. Findings: No rash. Neurological:      Mental Status: He is alert and oriented to person, place, and time. Deep Tendon Reflexes: Reflexes are normal and symmetric. Psychiatric:         Mood and Affect: Mood and affect normal.         Speech: Speech normal.         Behavior: Behavior normal.       Assessment/Plan:      Shyanne Escalona was seen today for hypertension.     Diagnoses and all orders for this visit:    Type 2 diabetes mellitus without complication, with long-term current use of insulin ([facility])  -     POCT glycosylated hemoglobin (Hb A1C)  -     LIPID PANEL; Future  -     Continuous Blood Gluc Sensor (FREESTYLE JOANIE 14 DAY SENSOR) MISC; 1 Device by Does not apply route continuous  -consider restart of statin    Recurrent acute deep vein thrombosis (DVT) of left lower extremity (HCC)  -     POCT INR  -therapeutic range, continue coumadin 7.5 mg    Essential hypertension  -stable, continue same meds    Peripheral polyneuropathy  -     gabapentin (NEURONTIN) 300 MG capsule; Take 1 capsule by mouth 3 times daily for 180 days. Intended supply: 30 days  -dose increased today    Controlled Substance Monitoring:    Acute and Chronic Pain Monitoring:   RX Monitoring 8/19/2022   Attestation -   Acute Pain Prescriptions -   Periodic Controlled Substance Monitoring No signs of potential drug abuse or diversion identified. Chronic Pain > 80 MEDD -        As above. Call or go to ED immediately if symptoms worsen or persist.  Return in about 3 months (around 11/19/2022) for DM2., or sooner if necessary. Educational materials and/or home exercises printed for patient's review and were included in patient instructions on his/her After Visit Summary and given to patient at the end of visit. Counseled regarding above diagnosis, including possible risks and complications,  especially if left uncontrolled. Counseled regarding the possible side effects, risks, benefits and alternatives to treatment; patient and/or guardian verbalizes understanding, agrees, feels comfortable with and wishes to proceed with above treatment plan. Advised patient to call with any new medication issues, and read all Rx info from pharmacy to assure aware of all possible risks and side effects of medication before taking. Reviewed age and gender appropriate health screening exams and vaccinations.   Advised patient regarding importance of keeping up with recommended health maintenance and to schedule as soon as possible if overdue, as this is important in assessing for undiagnosed pathology, especially cancer, as well as protecting against potentially harmful/life threatening disease. Patient and/or guardian verbalizes understanding and agrees with above counseling, assessment and plan. All questions answered. Wade Pantoja PA-C  8/19/2022    I have personally reviewed and updated the chief complaint, HPI, Past Medical, Family and Social History, as well as the above Review of Systems.

## 2022-08-22 DIAGNOSIS — E11.9 TYPE 2 DIABETES MELLITUS WITHOUT COMPLICATION, WITH LONG-TERM CURRENT USE OF INSULIN (HCC): ICD-10-CM

## 2022-08-22 DIAGNOSIS — Z79.4 TYPE 2 DIABETES MELLITUS WITHOUT COMPLICATION, WITH LONG-TERM CURRENT USE OF INSULIN (HCC): ICD-10-CM

## 2022-08-22 RX ORDER — FLASH GLUCOSE SENSOR
1 KIT MISCELLANEOUS CONTINUOUS
Qty: 4 EACH | Refills: 2 | Status: SHIPPED | OUTPATIENT
Start: 2022-08-22

## 2022-09-04 DIAGNOSIS — K52.9 CHRONIC DIARRHEA: ICD-10-CM

## 2022-09-06 RX ORDER — LOPERAMIDE HYDROCHLORIDE 2 MG/1
CAPSULE ORAL
Qty: 120 CAPSULE | Refills: 1 | Status: SHIPPED | OUTPATIENT
Start: 2022-09-06

## 2022-09-06 RX ORDER — GABAPENTIN 100 MG/1
CAPSULE ORAL
Qty: 90 CAPSULE | Refills: 0 | OUTPATIENT
Start: 2022-09-06 | End: 2022-12-05

## 2022-09-27 DIAGNOSIS — Z86.718 HISTORY OF DEEP VEIN THROMBOSIS: ICD-10-CM

## 2022-09-27 RX ORDER — WARFARIN SODIUM 5 MG/1
TABLET ORAL
Qty: 30 TABLET | Refills: 1 | Status: ON HOLD
Start: 2022-09-27 | End: 2022-10-21 | Stop reason: HOSPADM

## 2022-09-27 RX ORDER — WARFARIN SODIUM 2.5 MG/1
TABLET ORAL
Qty: 90 TABLET | Refills: 1 | Status: ON HOLD
Start: 2022-09-27 | End: 2022-10-21 | Stop reason: HOSPADM

## 2022-10-05 DIAGNOSIS — I10 ESSENTIAL HYPERTENSION: ICD-10-CM

## 2022-10-05 RX ORDER — LISINOPRIL 5 MG/1
TABLET ORAL
Qty: 90 TABLET | Refills: 1 | Status: SHIPPED | OUTPATIENT
Start: 2022-10-05

## 2022-10-11 DIAGNOSIS — J30.2 SEASONAL ALLERGIES: ICD-10-CM

## 2022-10-11 RX ORDER — FLUTICASONE PROPIONATE 50 MCG
1 SPRAY, SUSPENSION (ML) NASAL NIGHTLY
Qty: 16 G | Refills: 2 | Status: SHIPPED | OUTPATIENT
Start: 2022-10-11

## 2022-10-14 ENCOUNTER — HOSPITAL ENCOUNTER (INPATIENT)
Age: 47
LOS: 7 days | Discharge: HOME HEALTH | DRG: 813 | End: 2022-10-21
Attending: EMERGENCY MEDICINE | Admitting: INTERNAL MEDICINE
Payer: MEDICARE

## 2022-10-14 ENCOUNTER — APPOINTMENT (OUTPATIENT)
Dept: CT IMAGING | Age: 47
DRG: 813 | End: 2022-10-14
Payer: MEDICARE

## 2022-10-14 DIAGNOSIS — R79.1 SUPRATHERAPEUTIC INR: ICD-10-CM

## 2022-10-14 DIAGNOSIS — J05.11 ACUTE EPIGLOTTITIS WITH AIRWAY OBSTRUCTION: Primary | ICD-10-CM

## 2022-10-14 PROBLEM — E66.812 CLASS 2 SEVERE OBESITY DUE TO EXCESS CALORIES WITH SERIOUS COMORBIDITY IN ADULT: Status: ACTIVE | Noted: 2022-10-14

## 2022-10-14 PROBLEM — E66.01 CLASS 2 SEVERE OBESITY DUE TO EXCESS CALORIES WITH SERIOUS COMORBIDITY IN ADULT (HCC): Status: ACTIVE | Noted: 2022-10-14

## 2022-10-14 LAB
ABO/RH: NORMAL
ACANTHOCYTES: ABNORMAL
ADENOVIRUS BY PCR: NOT DETECTED
ANION GAP SERPL CALCULATED.3IONS-SCNC: 8 MMOL/L (ref 7–16)
ANISOCYTOSIS: ABNORMAL
ANTIBODY SCREEN: NORMAL
ATYPICAL LYMPHOCYTE RELATIVE PERCENT: 3.5 % (ref 0–4)
BASOPHILS ABSOLUTE: 0.45 E9/L (ref 0–0.2)
BASOPHILS RELATIVE PERCENT: 3.5 % (ref 0–2)
BLOOD BANK DISPENSE STATUS: NORMAL
BLOOD BANK DISPENSE STATUS: NORMAL
BLOOD BANK PRODUCT CODE: NORMAL
BLOOD BANK PRODUCT CODE: NORMAL
BORDETELLA PARAPERTUSSIS BY PCR: NOT DETECTED
BORDETELLA PERTUSSIS BY PCR: NOT DETECTED
BPU ID: NORMAL
BPU ID: NORMAL
BUN BLDV-MCNC: 11 MG/DL (ref 6–20)
C-REACTIVE PROTEIN: 5.2 MG/DL (ref 0–0.4)
CALCIUM SERPL-MCNC: 9.1 MG/DL (ref 8.6–10.2)
CHLAMYDOPHILIA PNEUMONIAE BY PCR: NOT DETECTED
CHLORIDE BLD-SCNC: 100 MMOL/L (ref 98–107)
CO2: 28 MMOL/L (ref 22–29)
CORONAVIRUS 229E BY PCR: NOT DETECTED
CORONAVIRUS HKU1 BY PCR: NOT DETECTED
CORONAVIRUS NL63 BY PCR: NOT DETECTED
CORONAVIRUS OC43 BY PCR: NOT DETECTED
CREAT SERPL-MCNC: 0.9 MG/DL (ref 0.7–1.2)
DESCRIPTION BLOOD BANK: NORMAL
DESCRIPTION BLOOD BANK: NORMAL
EOSINOPHILS ABSOLUTE: 0.55 E9/L (ref 0.05–0.5)
EOSINOPHILS RELATIVE PERCENT: 4.3 % (ref 0–6)
GFR AFRICAN AMERICAN: >60
GFR NON-AFRICAN AMERICAN: >60 ML/MIN/1.73
GLUCOSE BLD-MCNC: 111 MG/DL (ref 74–99)
HCT VFR BLD CALC: 35.3 % (ref 37–54)
HEMOGLOBIN: 11.6 G/DL (ref 12.5–16.5)
HUMAN METAPNEUMOVIRUS BY PCR: NOT DETECTED
HUMAN RHINOVIRUS/ENTEROVIRUS BY PCR: NOT DETECTED
INFLUENZA A BY PCR: NOT DETECTED
INFLUENZA B BY PCR: NOT DETECTED
INR BLD: 9.9
INR BLD: 9.9
LYMPHOCYTES ABSOLUTE: 1.55 E9/L (ref 1.5–4)
LYMPHOCYTES RELATIVE PERCENT: 8.7 % (ref 20–42)
MAGNESIUM: 2 MG/DL (ref 1.6–2.6)
MCH RBC QN AUTO: 29.7 PG (ref 26–35)
MCHC RBC AUTO-ENTMCNC: 32.9 % (ref 32–34.5)
MCV RBC AUTO: 90.5 FL (ref 80–99.9)
MONOCYTES ABSOLUTE: 1.29 E9/L (ref 0.1–0.95)
MONOCYTES RELATIVE PERCENT: 10.4 % (ref 2–12)
MYCOPLASMA PNEUMONIAE BY PCR: NOT DETECTED
NEUTROPHILS ABSOLUTE: 9.03 E9/L (ref 1.8–7.3)
NEUTROPHILS RELATIVE PERCENT: 69.6 % (ref 43–80)
OVALOCYTES: ABNORMAL
PARAINFLUENZA VIRUS 1 BY PCR: NOT DETECTED
PARAINFLUENZA VIRUS 2 BY PCR: NOT DETECTED
PARAINFLUENZA VIRUS 3 BY PCR: NOT DETECTED
PARAINFLUENZA VIRUS 4 BY PCR: NOT DETECTED
PDW BLD-RTO: 13.6 FL (ref 11.5–15)
PHOSPHORUS: 3.8 MG/DL (ref 2.5–4.5)
PLATELET # BLD: 637 E9/L (ref 130–450)
PMV BLD AUTO: 9.3 FL (ref 7–12)
POIKILOCYTES: ABNORMAL
POLYCHROMASIA: ABNORMAL
POTASSIUM REFLEX MAGNESIUM: 4.7 MMOL/L (ref 3.5–5)
PROCALCITONIN: 0.06 NG/ML (ref 0–0.08)
PROTHROMBIN TIME: 105.3 SEC (ref 9.3–12.4)
PROTHROMBIN TIME: 105.9 SEC (ref 9.3–12.4)
RBC # BLD: 3.9 E12/L (ref 3.8–5.8)
RESPIRATORY SYNCYTIAL VIRUS BY PCR: NOT DETECTED
SARS-COV-2, NAAT: NOT DETECTED
SARS-COV-2, PCR: NOT DETECTED
SEDIMENTATION RATE, ERYTHROCYTE: 49 MM/HR (ref 0–15)
SODIUM BLD-SCNC: 136 MMOL/L (ref 132–146)
WBC # BLD: 12.9 E9/L (ref 4.5–11.5)

## 2022-10-14 PROCEDURE — 84145 PROCALCITONIN (PCT): CPT

## 2022-10-14 PROCEDURE — 70491 CT SOFT TISSUE NECK W/DYE: CPT

## 2022-10-14 PROCEDURE — 2580000003 HC RX 258: Performed by: EMERGENCY MEDICINE

## 2022-10-14 PROCEDURE — 96365 THER/PROPH/DIAG IV INF INIT: CPT

## 2022-10-14 PROCEDURE — 85025 COMPLETE CBC W/AUTO DIFF WBC: CPT

## 2022-10-14 PROCEDURE — 93005 ELECTROCARDIOGRAM TRACING: CPT

## 2022-10-14 PROCEDURE — 92511 NASOPHARYNGOSCOPY: CPT | Performed by: OTOLARYNGOLOGY

## 2022-10-14 PROCEDURE — 83735 ASSAY OF MAGNESIUM: CPT

## 2022-10-14 PROCEDURE — 36415 COLL VENOUS BLD VENIPUNCTURE: CPT

## 2022-10-14 PROCEDURE — P9059 PLASMA, FRZ BETWEEN 8-24HOUR: HCPCS

## 2022-10-14 PROCEDURE — 0202U NFCT DS 22 TRGT SARS-COV-2: CPT

## 2022-10-14 PROCEDURE — 86901 BLOOD TYPING SEROLOGIC RH(D): CPT

## 2022-10-14 PROCEDURE — 86900 BLOOD TYPING SEROLOGIC ABO: CPT

## 2022-10-14 PROCEDURE — A4216 STERILE WATER/SALINE, 10 ML: HCPCS | Performed by: EMERGENCY MEDICINE

## 2022-10-14 PROCEDURE — 86850 RBC ANTIBODY SCREEN: CPT

## 2022-10-14 PROCEDURE — 84100 ASSAY OF PHOSPHORUS: CPT

## 2022-10-14 PROCEDURE — 87635 SARS-COV-2 COVID-19 AMP PRB: CPT

## 2022-10-14 PROCEDURE — 80048 BASIC METABOLIC PNL TOTAL CA: CPT

## 2022-10-14 PROCEDURE — 99285 EMERGENCY DEPT VISIT HI MDM: CPT

## 2022-10-14 PROCEDURE — 2500000003 HC RX 250 WO HCPCS: Performed by: EMERGENCY MEDICINE

## 2022-10-14 PROCEDURE — 6360000002 HC RX W HCPCS: Performed by: OTOLARYNGOLOGY

## 2022-10-14 PROCEDURE — 99223 1ST HOSP IP/OBS HIGH 75: CPT | Performed by: INTERNAL MEDICINE

## 2022-10-14 PROCEDURE — 6360000002 HC RX W HCPCS: Performed by: EMERGENCY MEDICINE

## 2022-10-14 PROCEDURE — 85610 PROTHROMBIN TIME: CPT

## 2022-10-14 PROCEDURE — 87040 BLOOD CULTURE FOR BACTERIA: CPT

## 2022-10-14 PROCEDURE — 85651 RBC SED RATE NONAUTOMATED: CPT

## 2022-10-14 PROCEDURE — 36430 TRANSFUSION BLD/BLD COMPNT: CPT

## 2022-10-14 PROCEDURE — 86140 C-REACTIVE PROTEIN: CPT

## 2022-10-14 PROCEDURE — 99222 1ST HOSP IP/OBS MODERATE 55: CPT | Performed by: OTOLARYNGOLOGY

## 2022-10-14 PROCEDURE — 96375 TX/PRO/DX INJ NEW DRUG ADDON: CPT

## 2022-10-14 PROCEDURE — 0CJS8ZZ INSPECTION OF LARYNX, VIA NATURAL OR ARTIFICIAL OPENING ENDOSCOPIC: ICD-10-PCS | Performed by: INTERNAL MEDICINE

## 2022-10-14 PROCEDURE — 2580000003 HC RX 258

## 2022-10-14 PROCEDURE — 6360000004 HC RX CONTRAST MEDICATION: Performed by: RADIOLOGY

## 2022-10-14 PROCEDURE — 2000000000 HC ICU R&B

## 2022-10-14 RX ORDER — ENOXAPARIN SODIUM 100 MG/ML
30 INJECTION SUBCUTANEOUS 2 TIMES DAILY
Status: DISCONTINUED | OUTPATIENT
Start: 2022-10-14 | End: 2022-10-18

## 2022-10-14 RX ORDER — ONDANSETRON 4 MG/1
4 TABLET, ORALLY DISINTEGRATING ORAL EVERY 8 HOURS PRN
Status: DISCONTINUED | OUTPATIENT
Start: 2022-10-14 | End: 2022-10-14

## 2022-10-14 RX ORDER — ACETAMINOPHEN 325 MG/1
650 TABLET ORAL EVERY 6 HOURS PRN
Status: DISCONTINUED | OUTPATIENT
Start: 2022-10-14 | End: 2022-10-21 | Stop reason: HOSPADM

## 2022-10-14 RX ORDER — ONDANSETRON 2 MG/ML
4 INJECTION INTRAMUSCULAR; INTRAVENOUS EVERY 6 HOURS PRN
Status: DISCONTINUED | OUTPATIENT
Start: 2022-10-14 | End: 2022-10-21 | Stop reason: HOSPADM

## 2022-10-14 RX ORDER — SODIUM CHLORIDE 0.9 % (FLUSH) 0.9 %
5-40 SYRINGE (ML) INJECTION PRN
Status: DISCONTINUED | OUTPATIENT
Start: 2022-10-14 | End: 2022-10-21 | Stop reason: HOSPADM

## 2022-10-14 RX ORDER — DEXAMETHASONE SODIUM PHOSPHATE 10 MG/ML
10 INJECTION, SOLUTION INTRAMUSCULAR; INTRAVENOUS ONCE
Status: COMPLETED | OUTPATIENT
Start: 2022-10-14 | End: 2022-10-14

## 2022-10-14 RX ORDER — CYCLOBENZAPRINE HCL 10 MG
10 TABLET ORAL 3 TIMES DAILY PRN
Status: ON HOLD | COMMUNITY
End: 2022-10-21

## 2022-10-14 RX ORDER — DEXTROSE MONOHYDRATE 100 MG/ML
INJECTION, SOLUTION INTRAVENOUS CONTINUOUS PRN
Status: DISCONTINUED | OUTPATIENT
Start: 2022-10-14 | End: 2022-10-21 | Stop reason: HOSPADM

## 2022-10-14 RX ORDER — SODIUM CHLORIDE 9 MG/ML
INJECTION, SOLUTION INTRAVENOUS PRN
Status: DISCONTINUED | OUTPATIENT
Start: 2022-10-14 | End: 2022-10-21 | Stop reason: HOSPADM

## 2022-10-14 RX ORDER — ACETAMINOPHEN 650 MG/1
650 SUPPOSITORY RECTAL EVERY 6 HOURS PRN
Status: DISCONTINUED | OUTPATIENT
Start: 2022-10-14 | End: 2022-10-21 | Stop reason: HOSPADM

## 2022-10-14 RX ORDER — POLYETHYLENE GLYCOL 3350 17 G/17G
17 POWDER, FOR SOLUTION ORAL DAILY PRN
Status: DISCONTINUED | OUTPATIENT
Start: 2022-10-14 | End: 2022-10-21 | Stop reason: HOSPADM

## 2022-10-14 RX ORDER — ONDANSETRON 2 MG/ML
4 INJECTION INTRAMUSCULAR; INTRAVENOUS EVERY 6 HOURS PRN
Status: DISCONTINUED | OUTPATIENT
Start: 2022-10-14 | End: 2022-10-14

## 2022-10-14 RX ORDER — DIPHENHYDRAMINE HYDROCHLORIDE 50 MG/ML
25 INJECTION INTRAMUSCULAR; INTRAVENOUS ONCE
Status: COMPLETED | OUTPATIENT
Start: 2022-10-14 | End: 2022-10-14

## 2022-10-14 RX ORDER — DEXAMETHASONE SODIUM PHOSPHATE 10 MG/ML
10 INJECTION, SOLUTION INTRAMUSCULAR; INTRAVENOUS EVERY 8 HOURS
Status: DISCONTINUED | OUTPATIENT
Start: 2022-10-14 | End: 2022-10-14

## 2022-10-14 RX ORDER — SODIUM CHLORIDE 0.9 % (FLUSH) 0.9 %
5-40 SYRINGE (ML) INJECTION EVERY 12 HOURS SCHEDULED
Status: DISCONTINUED | OUTPATIENT
Start: 2022-10-14 | End: 2022-10-21 | Stop reason: HOSPADM

## 2022-10-14 RX ORDER — ONDANSETRON 4 MG/1
4 TABLET, ORALLY DISINTEGRATING ORAL EVERY 8 HOURS PRN
Status: DISCONTINUED | OUTPATIENT
Start: 2022-10-14 | End: 2022-10-21 | Stop reason: HOSPADM

## 2022-10-14 RX ORDER — DEXAMETHASONE SODIUM PHOSPHATE 10 MG/ML
8 INJECTION, SOLUTION INTRAMUSCULAR; INTRAVENOUS EVERY 8 HOURS
Status: DISCONTINUED | OUTPATIENT
Start: 2022-10-14 | End: 2022-10-16

## 2022-10-14 RX ADMIN — FAMOTIDINE 20 MG: 10 INJECTION INTRAVENOUS at 13:24

## 2022-10-14 RX ADMIN — DIPHENHYDRAMINE HYDROCHLORIDE 25 MG: 50 INJECTION, SOLUTION INTRAMUSCULAR; INTRAVENOUS at 13:23

## 2022-10-14 RX ADMIN — AMPICILLIN SODIUM AND SULBACTAM SODIUM 3000 MG: 2; 1 INJECTION, POWDER, FOR SOLUTION INTRAMUSCULAR; INTRAVENOUS at 11:47

## 2022-10-14 RX ADMIN — SODIUM CHLORIDE, PRESERVATIVE FREE 10 ML: 5 INJECTION INTRAVENOUS at 20:55

## 2022-10-14 RX ADMIN — DEXAMETHASONE SODIUM PHOSPHATE 10 MG: 10 INJECTION, SOLUTION INTRAMUSCULAR; INTRAVENOUS at 11:44

## 2022-10-14 RX ADMIN — DEXAMETHASONE SODIUM PHOSPHATE 8 MG: 10 INJECTION, SOLUTION INTRAMUSCULAR; INTRAVENOUS at 20:53

## 2022-10-14 RX ADMIN — TRANEXAMIC ACID 1000 MG: 1 INJECTION, SOLUTION INTRAVENOUS at 13:29

## 2022-10-14 RX ADMIN — IOPAMIDOL 75 ML: 755 INJECTION, SOLUTION INTRAVENOUS at 10:03

## 2022-10-14 ASSESSMENT — ENCOUNTER SYMPTOMS
TROUBLE SWALLOWING: 1
ABDOMINAL PAIN: 0
DIARRHEA: 0
NAUSEA: 0
COUGH: 0
RHINORRHEA: 0
VOICE CHANGE: 1
SORE THROAT: 1
COLOR CHANGE: 0
SHORTNESS OF BREATH: 0
VOMITING: 0
STRIDOR: 0
BLOOD IN STOOL: 0

## 2022-10-14 ASSESSMENT — LIFESTYLE VARIABLES
HOW MANY STANDARD DRINKS CONTAINING ALCOHOL DO YOU HAVE ON A TYPICAL DAY: PATIENT DOES NOT DRINK
HOW OFTEN DO YOU HAVE A DRINK CONTAINING ALCOHOL: NEVER

## 2022-10-14 ASSESSMENT — PAIN - FUNCTIONAL ASSESSMENT: PAIN_FUNCTIONAL_ASSESSMENT: NONE - DENIES PAIN

## 2022-10-14 NOTE — CONSULTS
no residual s/s    History of deep vein thrombosis 8/31/2021    Hyperlipidemia     Hypertension     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    Lymphedema of left leg 8/31/2021    Obesity (BMI 35.0-39.9 without comorbidity)     bmi 39.2  weight 296 #    Osteoarthritis     Recurrent acute deep vein thrombosis (DVT) of left lower extremity ([address] 8/31/2021    S/P IVC filter 8/31/2021    Subtherapeutic international normalized ratio (INR) 8/31/2021    Thoracic or lumbosacral neuritis or radiculitis, unspecified        Past Surgical History:   Procedure Laterality Date    COLONOSCOPY N/A 9/5/2020    COLONOSCOPY WITH BIOPSY performed by Ilene Womack MD at [facility] ENDOSCOPY    [facility] NEW ACCESS  8/3/2020    CT PTC NEW ACCESS 8/3/2020 [facility]    FOOT SURGERY Right 1985    to treat shattered bones    HERNIA REPAIR  2001    DOUBLE HERNIA    HERNIA REPAIR Right 12/9/2019    LAPAROSCOPIC RIGHT INGUINAL HERNIA REPAIR, MESH 10x15 cm PLACEMENT performed by Yeimi Shen MD at [place] [address] Left 4/11/2016    ILIAC ARTERY STENT INSERTION N/A 10/11/2020    S/P ILIAC STENT PLACEMENT VISUALIZATION performed by Gio Barry MD at [address] N/A 9/18/2020    LAPAROTOMY EXPLORATORY, CHOLECYSTECTOMY, BOWEL RESECTION, right darian colectomy, partial omentectomy, and splenectomy performed by Ilene Womack MD at [address] [facility] [facility] [address] FLX DX W/MARIA ALEJANDRA Queen 1980 PFRMD N/A 5/7/2018    COLONOSCOPY DIAGNOSTIC performed by Marcia Giraldo MD at [address] Left 2014    Dr. Prudence Hollingsworth ARTHROSCOPY Left [address]  2001 &2007    RIGHT AND LEFT repair of tears    THROMBECTOMY / EMBOLECTOMY FEMORAL Left 1/1/2021    LEFT LOWER EXTREMITY, VENOGRAM, FOLLOW UP POSSIBLE REMOVAL LYSIS CATHETER performed by Gio Barry MD at [address] [facility] / EMBOLECTOMY FEMORAL Left 1/2/2021    LEFT LOWER EXTREMITY VENOGRAM performed by Kiana Arnold MD at [address] Right 10/31/2016    Total right hip  Libertad Austin MD    UPPER GASTROINTESTINAL ENDOSCOPY N/A 9/4/2020    EGD DIAGNOSTIC ONLY performed by Catherine Escobedo MD at [address] [facility] [address] Left 1/3/2021    LEFT LOWER EXTREMITY VENOGRAM, PLACEMENT OF NEW LYSIS CATHETER performed by Kiana Arnold MD at [place] [address] N/A 5/12/2022    OPEN [address] [employer] performed by Jori Condon MD at [address] [facility]  2007    LEFT WRIST       Medications Prior to Admission:    Prior to Admission medications    Medication Sig Start Date End Date Taking? Authorizing Provider   cyclobenzaprine (FLEXERIL) 10 MG tablet Take 10 mg by mouth 3 times daily as needed for Muscle spasms   Yes Historical Provider, MD   fluticasone (FLONASE) 50 MCG/ACT nasal spray 1 spray by Each Nostril route nightly 10/11/22   [place] Risk, PA-C   lisinopril (PRINIVIL;ZESTRIL) 5 MG tablet take 1 tablet by mouth once daily 10/5/22   [place] Risk, PA-C   warfarin (COUMADIN) 2.5 MG tablet take as directed 9/27/22   Andreas Risk, PA-C   warfarin (COUMADIN) 5 MG tablet take 1 tablet by mouth every evening with 2.5 MG TABLET FOR A TOTAL DOSE OF 7.5 MG DAILY 9/27/22   Andreas Risk, PA-C   loperamide (IMODIUM) 2 MG capsule take 1 capsule by mouth four times a day if needed for diarrhea 9/6/22   Andreas Risk, PA-C   Continuous Blood Gluc Sensor (FREESTYLE JOANIE 14 DAY SENSOR) MIS 1 Device by Does not apply route continuous 8/22/22   [place] Risk, PA-C   gabapentin (NEURONTIN) 300 MG capsule Take 1 capsule by mouth 3 times daily for 180 days.  Intended supply: 30 days 8/19/22 2/15/23  Andreas Risk, PA-C   insulin glargine (LANTUS) 100 UNIT/ML injection vial Inject 25 Units into the skin nightly 8/12/22   Andreas Risk, PA-C   aspirin 81 MG chewable tablet Take 1 tablet by mouth daily 5/15/22   Severa Benjamin MD [place]   sennosides-docusate sodium (SENOKOT-S) 8.6-50 MG tablet Take 1 tablet by mouth 2 times daily 5/14/22   Bertha Harris MD   Insulin Syringe-Needle U-100 30G X 5/16\" 1 ML MISC 1 each by Does not apply route daily Use as directed 3/8/22   Gaygertrude Swann PA-C   metoprolol succinate (TOPROL XL) 25 MG extended release tablet Take 1 tablet by mouth daily  Patient taking differently: Take 25 mg by mouth nightly 2/14/22   Gaycalderon Swann PA-C   Continuous Blood Gluc  (FREESTYLE JOANIE READER) SAMARIA 1 Device by Does not apply route continuous 2/14/22   Gaycalderon Swann PA-C       Allergies   Allergen Reactions    Seasonal      HAYFEVER / sneezing,watery eyes    Tramadol Itching       Family History   Problem Relation Age of Onset    Hypertension Mother     Arthritis Father     Diabetes Father     Cancer Maternal Grandfather         Skin    Cancer Paternal Uncle         skin    Diabetes Paternal Grandfather     Heart Disease Maternal Grandmother     Stroke Maternal Aunt        Social History     Tobacco Use    Smoking status: Never    Smokeless tobacco: Current     Types: Chew   Vaping Use    Vaping Use: Never used   Substance Use Topics    Alcohol use: Not Currently     Alcohol/week: 0.0 standard drinks    Drug use: No         Review of Systems   General ROS: negative  Hematological and Lymphatic ROS: negative  Respiratory ROS: positive for - shortness of breath  Cardiovascular ROS: no chest pain or dyspnea on exertion  Gastrointestinal ROS: no abdominal pain, change in bowel habits, or black or bloody stools  Genito-Urinary ROS: no dysuria, trouble voiding, or hematuria  Musculoskeletal ROS: neck pain      PHYSICAL EXAM:    Vitals:    10/14/22 0730   BP:    Pulse:    Resp:    Temp: 99.2 °F (37.3 °C)   SpO2:        General Appearance:  Laying bed, awake, alert, no apparent distress, on room air, O2 in high 90s, no stridor, tolerating secretions, voice muffled   Head/face:  NC/AT  Eyes: PERRL, EOMI  ENT: mild trismus, 1+ tonsils equal bilaterally, no peritonsillar edema, mild erythema over left peritonsillar area, uvula is midline and nonedematous, tongue is midline nonedematous and rests in mouth comfortably, dentition poor w/ caries, left parotid and submandibular regions mildly tender and swollen to touch, trachea is midline, cricoid is low, obese neck, nares very dry bilaterally R > L, external ears and Tms intact without effusion  Lungs:  Non-labored, good respiratory effort, no stridor  Heart:  RR    Flexible Nasopharyngolaryngoscope Procedure Note    Procedure Details   The left side(s) of the nose was topically anesthetized with spray. The nasal cavities were inspected to determine which side would be more amenable to the scope being passed through. After waiting an appropriate period of time for anesthesia/ vasoconstriction to become effective, the flexible nasopharyngolaryngoscope was passed through the left side(s) of the nose, and the nose, nasopharynx, oropharynx, hypopharynx and larynx were examined. Examination was performed during quiet respiration and with phonation. The following findings were noted. Findings:   Normal nasopharynx, normal epiglottis, normal tongue base, Significant amount of secretions limiting examination, epiglottis significantly edematous/omega shaped, unable to visualize glottic airway or cords, retropharyngeal swelling, vallecula clear. Condition:  Stable  Complications:  None          LABS:    CBC  Recent Labs     10/14/22  0716   WBC 12.9*   HGB 11.6*   HCT 35.3*   *     BMP  Recent Labs     10/14/22  0716      K 4.7      CO2 28   BUN 11   CREATININE 0.9   CALCIUM 9.1     COAG's  Recent Labs     10/14/22  0716   INR 9.9*     CALCIUM  Recent Labs     10/14/22  0716   CALCIUM 9.1     PTH  No results for input(s): PTH in the last 72 hours.     RADIOLOGY    CT SOFT TISSUE NECK W CONTRAST    Result Date: 10/14/2022  Abnormal retropharyngeal fluid collection consistent with a phlegmon extending from the skull base to the C7 level measuring 1 cm in thickness. Edematous appearance of the epiglottis concerning for acute epiglottitis. There is mild to moderate supraglottic airway narrowing. Diffusely edematous appearance of the oropharynx and hypopharynx consistent with a severe pharyngitis. No drainable abscess identified. ASSESSMENT:  48 y.o. male with angioedema/ acute epiglottis unknown etiology, potentially ACEI induced vs viral  with very tenuous airway     PLAN:  -clinically he is stable at this time, on room air high 90s O2 sat, no stridor  -discussed scope findings with patient who is aware he has a very tenuous airway and if symptoms progress may need tracheostomy. Patient would be very difficult intubation secondary to epiglottic swelling and could require tracheotomy. Risks benefits alternatives discussed with patient about emergent tracheotomy if his symptoms worsen and if he becomes in respiratory distress. Pt understands.   -stop lisinopril, recommend cessation of all ACEI/ARBS, discussed w/ pt to not take this medication any more and to follow up with PCP to change BP meds  -continue IV decadron 10mg Q8hrs, pepcid, benadryl, unasyn   -admit to ICU for close airway observation   -ENT to follow  -keep flexible nasopharyngoscope at bedside     Patient seen and discussed with Attending.        Electronically signed by Frannie Gillespie DO on 10/14/22 at 11:40 AM EDT

## 2022-10-14 NOTE — CONSULTS
Medical Intensive Care Unit     [address]  Resident Consult Note    Date and time: 10/15/2022 3:30 AM  Patient's name:  Sissy Lovelace  Medical Record Number: [id number]  Patient's account/billing number: [de-identified]  Patient's YOB: 1975  Age: 48 y.o. Date of Admission: 10/14/2022  6:55 AM  Length of stay during current admission: 1    Primary Care Physician: Jerel Oakes PA-C  ICU Attending Physician: Dr. Tierra Wahl     Code Status: Full Code    Reason for ICU admission: Acute airway obstruction     History of Present Illness:     Imagene Meigs is a 42-year-old male with significant medical history of HTN, T2DM, DVT to proximal left leg, s/p IVC filter placed on 9/20/2021, chronically on warfarin, who presented to the ED with acute onset left-sided neck pain associated with hoarseness and change in his voice which began 1 day prior. Patient's symptoms began about 2 days prior to admission without any obvious trigger. His neck swelling and pain progressively became worse over the course of the day. Pain radiates from the left ear to the left neck and is worsened with movement. Symptoms have been associated with dysphagia as well as hoarseness. He denies any sick exposure, known allergen exposure, or travel history. Review of his med list shows the patient is on lisinopril for hypertension of which she has been tolerating well without issues in the past.  In the ED, patient's vitals were stable. Initial work-up showed mild leukocytosis with white count of 12.9, Hgb 11.6, .3 and a supratherapeutic INR of 9.9. CT scanning of soft tissue neck with contrast showed abnormal retropharyngeal fluid collection extending from the skull base to the level of C7 measuring about 1 cm in thickness and an edematous epiglottis concerning for acute epiglottitis and acute pharyngitis. Patient was started on dexamethasone 10 mg, Unasyn, Benadryl and famotidine.   He also received tranexamic acid and was transfused 1 unit FFP. ENT was consulted who is currently following the patient. Due to concerns for airway collapse and decompensation, patient was transferred to the ICU for further monitoring. In the ICU, patient's vitals remained stable with O2 sats at >95% on 5 L NC.      CURRENT VENTILATION STATUS:   [] Ventilator  [] BIPAP  [] Nasal Cannula [x] Room Air      SECRETIONS   Amount:  [x] Small [] Moderate  [] Large [] None    Color:     [] White [] Colored  [] Bloody    SEDATION:  RAAS Score:  [] Propofol gtt  [] Versed gtt  [] Fentanyl gtt  [x] No Sedation    PARALYZED:  [x] No    [] Yes    VASOPRESSORS:  [x] No    [] Yes    If yes -   [] Levophed       [] Dopamine     [] Vasopressin       [] Dobutamine  [] Phenylephrine         [] Epinephrine    CENTRAL LINES:     [x] No   [] Yes   (Date of Insertion:   )           If yes -     [] Right IJ     [] Left IJ [] Right Femoral [] Left Femoral                   [] Right Subclavian [] Left Subclavian     GASTELUM'S CATHETER:   [x] No   [] Yes  (Date of Insertion:   )     URINE OUTPUT:            [x] Good   [] Low              [] Anuric    REVIEW OF SYSTEMS:    Constitutional: No fever, no chills, no change in weight; good appetite  HEENT: No blurred vision, no ear problems, no sore throat, no rhinorrhea. Respiratory: No cough, no sputum production, no pleuritic chest pain, no shortness of breath  Cardiology: No angina, no dyspnea on exertion, no paroxysmal nocturnal dyspnea, no orthopnea, no palpitation, no leg swelling. Gastroenterology: No dysphagia, no reflux; no abdominal pain, no nausea or vomiting; no constipation or diarrhea.  No hematochezia   Genitourinary: No dysuria, no frequency, hesitancy; no hematuria  Musculoskeletal: no joint pain, no myalgia, no change in range of movement  Neurology: no focal weakness in extremities, no slurred speech, no double vision, no tingling or numbness sensation  Endocrinology: no temperature intolerance, no polyphagia, polydipsia or polyuria  Hematology: no increased bleeding, no bruising, no lymphadenopathy  Skin: no skin changes noticed by patient  Psychology: no depressed mood, no suicidal ideation    OBJECTIVE:     VITAL SIGNS:  BP (!) 142/83   Pulse 83   Temp 98.8 °F (37.1 °C) (Oral)   Resp 18   Ht 6' 1\" (1.854 m)   Wt 290 lb (131.5 kg)   SpO2 95%   BMI 38.26 kg/m²   Tmax over 24 hours:  Temp (24hrs), Av.6 °F (37 °C), Min:98 °F (36.7 °C), Max:99.2 °F (37.3 °C)      Patient Vitals for the past 6 hrs:   BP Temp Temp src Pulse Resp SpO2   10/15/22 0000 (!) 142/83 98.8 °F (37.1 °C) Oral 83 18 95 %   10/14/22 2300 (!) 144/87 -- -- 96 17 98 %   10/14/22 2200 (!) 151/87 -- -- 73 14 98 %         Intake/Output Summary (Last 24 hours) at 10/15/2022 0330  Last data filed at 10/14/2022 1538  Gross per 24 hour   Intake 335 ml   Output --   Net 335 ml     Wt Readings from Last 2 Encounters:   10/14/22 290 lb (131.5 kg)   22 295 lb (133.8 kg)     Body mass index is 38.26 kg/m².       PHYSICAL EXAMINATION:  General appearance - alert, well appearing, and in no distress  Mental status - alert, oriented to person, place, and time  Eyes - pupils equal and reactive, extraocular eye movements intact  Ears - bilateral TM's and external ear canals normal  Nose - normal and patent, no erythema, discharge or polyps  Mouth - mucous membranes moist, pharynx normal without lesions  Neck - Neck swelling, normal thyroid exam   Chest - No stridor, clear to auscultation, no wheezes, rales or rhonchi, symmetric air entry  Heart - normal rate, regular rhythm, normal S1, S2, no murmurs, rubs, clicks or gallops  Abdomen - soft, nontender, nondistended, no masses or organomegaly  Neurological - alert, oriented, normal speech, no focal findings or movement disorder noted}  Extremities - peripheral pulses normal, no pedal edema, no clubbing or cyanosis  Skin - normal coloration and turgor, no rashes, no suspicious skin lesions noted      Any additional physical findings:    MEDICATIONS:  Scheduled Meds:   sodium chloride flush  5-40 mL IntraVENous 2 times per day    enoxaparin  30 mg SubCUTAneous BID    dexamethasone  8 mg IntraVENous Q8H     Continuous Infusions:   sodium chloride      sodium chloride      dextrose       PRN Meds:   sodium chloride, , PRN  sodium chloride flush, 5-40 mL, PRN  sodium chloride, , PRN  ondansetron, 4 mg, Q8H PRN   Or  ondansetron, 4 mg, Q6H PRN  polyethylene glycol, 17 g, Daily PRN  acetaminophen, 650 mg, Q6H PRN   Or  acetaminophen, 650 mg, Q6H PRN  glucose, 4 tablet, PRN  dextrose bolus, 125 mL, PRN   Or  dextrose bolus, 250 mL, PRN  glucagon (rDNA), 1 mg, PRN  dextrose, , Continuous PRN      VENT SETTINGS (Comprehensive) (if applicable): Additional Respiratory Assessments  Heart Rate: 83  Resp: 18  SpO2: 95 %    ABGs:   No results for input(s): PH, PCO2, PO2, HCO3, BE, O2SAT in the last 72 hours. Laboratory findings:  Complete Blood Count:   Recent Labs     10/14/22  0716   WBC 12.9*   HGB 11.6*   HCT 35.3*   *        Last 3 Blood Glucose:   Recent Labs     10/14/22  0716   GLUCOSE 111*        PT/INR:    Lab Results   Component Value Date/Time    PROTIME 105.9 10/14/2022 01:17 PM    PROTIME 35.6 08/19/2022 01:02 PM    INR 9.9 10/14/2022 01:17 PM     PTT:    Lab Results   Component Value Date/Time    APTT 66.2 09/09/2021 02:20 PM       Comprehensive Metabolic Profile:   Recent Labs     10/14/22  0716      K 4.7      CO2 28   BUN 11   CREATININE 0.9   GLUCOSE 111*   CALCIUM 9.1      Magnesium:   Lab Results   Component Value Date/Time    MG 2.0 10/14/2022 08:42 PM     Phosphorus:   Lab Results   Component Value Date/Time    PHOS 3.8 10/14/2022 08:42 PM     Ionized Calcium:   Lab Results   Component Value Date/Time    CAION 1.32 09/21/2020 06:00 AM      Troponin: No results for input(s): TROPONINI in the last 72 hours.       Radiology/Imaging:   CT SOFT TISSUE NECK W CONTRAST   Final Result   Abnormal retropharyngeal fluid collection consistent with a phlegmon   extending from the skull base to the C7 level measuring 1 cm in thickness. Edematous appearance of the epiglottis concerning for acute epiglottitis. There is mild-to-moderate supraglottic airway narrowing. Diffusely edematous appearance of the oropharynx and hypopharynx consistent   with a severe pharyngitis. No drainable abscess identified. ASSESSMENT  And PLAN:   Assessment:  Acute epiglottitis s/p ENT scope in ED  CT soft tissue neck: Retropharyngeal fluid collection, s/o phlegmon extending from skull base to C7, 1 cm with acute epiglottitis and acute pharyngitis and mild to moderate supraglottic airway narrowing  Leukocytosis, 12.9, reactive vs infection   Supra therapeutic INR, (9.9) in the setting of chronic warfarin   S/p TXA, FFP x 2  Normocytic anemia, Hg 11.9  Thrombocytosis, Hx of Hydrea  Hx recurrent DVT's, on chronic coumadin   IDDM, on home Lantus 25 units   HTN, on home Toprol XL 25 OD  DM neuropathy, on home Gabapentin 300 TID  Hx c-spine fusion surgery, 2000    Plan:  Continue Decadron q8hr, pepcid, benadryl, unasyn   S/p flexible scope by ENT, closely monitor respiratory status. Patient is low threshold for tracheostomy   ENT following, per recommendation keep flexible nasopharyngoscope at bedside   INR reversal, hold Coumadin. NPO for now  Basal insulin and ss for BG control   D5 1/2 NS maintenance fluid for now until able to eat   Encourage pt to avoid neck flexing while sleeping   Increase O2 requirement via NC as needed to maintain O2 sat > 90%  Low threshold for tracheostomy. Will consult ENT/Surgery if acutely decompensate for urgent tracheostomy     WEAN PER PROTOCOL:  [x] No   [] Yes  [] N/A    ICU PROPHYLAXIS:  Stress ulcer:  [x] PPI Agent  [] [id number] [] Sucralfate  [] Other:  VTE:   [] Enoxaparin  [] Unfract.  Heparin Subcut  [x] EPC Cuffs    NUTRITION:  [x] NPO [] Tube Feeding (Specify: ) [] TPN  [] PO (Diet: Diet NPO)    INSULIN DRIP:   [x] No   [] Yes    CONSULTATION NEEDED:   [] No   [x] Yes    FAMILY UPDATED:    [x] No   [] Yes    TRANSFER OUT OF ICU:   [x] No   [] Yes    Ermias Plunkett MD, PGY- 2  Attending Physician: Dr. Glendy Ling  10/15/2022, 3:30 AM     Lucretia Botello DO

## 2022-10-14 NOTE — CONSENT
Informed Consent for Blood Component Transfusion Note    I have discussed with the patient the rationale for blood component transfusion; its benefits in treating or preventing fatigue, organ damage, or death; and its risk which includes mild transfusion reactions, rare risk of blood borne infection, or more serious but rare reactions. I have discussed the alternatives to transfusion, including the risk and consequences of not receiving transfusion. The patient had an opportunity to ask questions and had agreed to proceed with transfusion of blood components.     Electronically signed by Kristy Hanks MD on 10/14/22 at 12:42 PM EDT

## 2022-10-14 NOTE — ED PROVIDER NOTES
ED PROVIDER NOTE    Chief Complaint   Patient presents with    Pharyngitis       HPI:  10/14/22,   Time: 7:13 AM EDT       Wendy Snyder II is a 48 y.o. male presenting to the ED for sore throat. Gradual onset last night, progressively worsening, moderate in severity, worse w/ swallowing. Yesterday developed pain in left jaw which has since progressed this morning to left sided anterior neck swelling. Painful to swallow but able to tolerate secretions. Associated hoarse voice. No fever, chills, cough, congestion, rhinorrhea, chest pain, shortness of breath, abdominal pain, nausea, vomiting, diarrhea. Does take lisinopril. Also on warfarin for hx of VTE. Normal po intake and urine output. Chart review: hx of HTN, HLD, VTE on warfarin s/p IVC filter, DM    Review of Systems:     Review of Systems   Constitutional:  Negative for appetite change, chills and fever. HENT:  Positive for sore throat, trouble swallowing and voice change. Negative for congestion, drooling and rhinorrhea. Eyes:  Negative for visual disturbance. Respiratory:  Negative for cough, shortness of breath and stridor. Cardiovascular:  Negative for chest pain and leg swelling. Gastrointestinal:  Negative for abdominal pain, blood in stool, diarrhea, nausea and vomiting. Genitourinary:  Negative for decreased urine volume, difficulty urinating, dysuria, frequency, hematuria and urgency. Musculoskeletal:  Negative for myalgias, neck pain and neck stiffness. Skin:  Negative for color change.    Neurological:  Negative for dizziness, syncope, weakness, light-headedness, numbness and headaches.       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    Allergic rhinitis     Blister of left leg 8/31/2021    Cellulitis of leg, left 8/31/2021    Chronic back pain     Depression     Dermatophytosis 8/31/2021    Diabetes mellitus ([facility]     Difficulty sleeping     at times    Displacement of lumbar intervertebral disc without myelopathy     Fibromyalgia     Fractured rib     2008 / healed    H/O seasonal allergies     Head injury 1980'S apx    no residual s/s    History of deep vein thrombosis 8/31/2021    Hyperlipidemia     Hypertension     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    Lymphedema of left leg 8/31/2021    Obesity (BMI 35.0-39.9 without comorbidity)     bmi 39.2  weight 296 #    Osteoarthritis     Recurrent acute deep vein thrombosis (DVT) of left lower extremity ([facility] 8/31/2021    S/P IVC filter 8/31/2021    Subtherapeutic international normalized ratio (INR) 8/31/2021    Thoracic or lumbosacral neuritis or radiculitis, unspecified        Past Surgical History:   Past Surgical History:   Procedure Laterality Date    COLONOSCOPY N/A 9/5/2020    COLONOSCOPY WITH BIOPSY performed by Sayda Norman MD at [facility] ENDOSCOPY    CT [facility] NEW ACCESS  8/3/2020    CT [facility] NEW ACCESS 8/3/2020 [facility]    FOOT SURGERY Right 1985    to treat shattered bones    HERNIA REPAIR  2001    DOUBLE HERNIA    HERNIA REPAIR Right 12/9/2019    LAPAROSCOPIC RIGHT INGUINAL HERNIA REPAIR, MESH 10x15 cm PLACEMENT performed by Alexis Patel MD at [place] [address] Left 4/11/2016    ILIAC ARTERY STENT INSERTION N/A 10/11/2020    S/P ILIAC STENT PLACEMENT VISUALIZATION performed by Christi Lindsay MD at [address] N/A 9/18/2020    LAPAROTOMY EXPLORATORY, CHOLECYSTECTOMY, BOWEL RESECTION, right darian colectomy, partial omentectomy, and splenectomy performed by Sayda Norman MD at [address] [facility] FLX DX W/MARIA ALEJANDRA Queen 1980 PFRMD N/A 5/7/2018 COLONOSCOPY DIAGNOSTIC performed by Jerel Farrar MD at [address] Left 2014    Dr. Ml Rodgers ARTHROSCOPY Left [address]  2001 &2007    RIGHT AND LEFT repair of tears    THROMBECTOMY / EMBOLECTOMY FEMORAL Left 1/1/2021    LEFT LOWER EXTREMITY, VENOGRAM, FOLLOW UP POSSIBLE REMOVAL LYSIS CATHETER performed by Ssuie Mosquera MD at [address] / EMBOLECTOMY FEMORAL Left 1/2/2021    LEFT LOWER EXTREMITY VENOGRAM performed by Susie Mosquera MD at [address] Right 10/31/2016    Total right hip  Nitesh Aguilar MD    UPPER GASTROINTESTINAL ENDOSCOPY N/A 9/4/2020    EGD DIAGNOSTIC ONLY performed by Mariana Barry MD at [address] Left 1/3/2021    LEFT LOWER EXTREMITY VENOGRAM, PLACEMENT OF NEW LYSIS CATHETER performed by Susie Mosquera MD at [facility] N/A 5/12/2022    OPEN [address] [place] performed by Roula eMndoza MD at [place] [address]  2007    LEFT WRIST       Social History:   Social History     Socioeconomic History    Marital status:    Occupational History    Occupation: disabled from     Tobacco Use    Smoking status: Never    Smokeless tobacco: Current     Types: Chew   Vaping Use    Vaping Use: Never used   Substance and Sexual Activity    Alcohol use: Not Currently     Alcohol/week: 0.0 standard drinks    Drug use: No    Sexual activity: Not Currently       Family History:   Family History   Problem Relation Age of Onset    Hypertension Mother     Arthritis Father     Diabetes Father     Cancer Maternal Grandfather         Skin    Cancer Paternal Uncle         skin    Diabetes Paternal Grandfather     Heart Disease Maternal Grandmother     Stroke Maternal Aunt        The patients home medications have been reviewed. Allergies:    Allergies   Allergen Reactions    Seasonal HAYFEVER / sneezing,watery eyes    Tramadol Itching           ---------------------------------------------------PHYSICAL EXAM--------------------------------------    BP (!) 152/84   Pulse 87   Resp 20   Ht 6' 1\" (1.854 m)   Wt 290 lb (131.5 kg)   SpO2 100%   BMI 38.26 kg/m²     Physical Exam  Vitals and nursing note reviewed. Constitutional:       General: He is not in acute distress. Appearance: He is not toxic-appearing. HENT:      Mouth/Throat:      Mouth: Mucous membranes are moist.      Pharynx: Oropharynx is clear. No oropharyngeal exudate or posterior oropharyngeal erythema. Comments: No tongue or oropharyngeal edema  Eyes:      General: No scleral icterus. Extraocular Movements: Extraocular movements intact. Pupils: Pupils are equal, round, and reactive to light. Neck:      Comments: Left submandibular swelling/fullness, ttp. No crepitus, fluctuance, erythema, warmth, induration. No stridor. Cardiovascular:      Rate and Rhythm: Normal rate and regular rhythm. Pulses: Normal pulses. Heart sounds: Normal heart sounds. No murmur heard. Pulmonary:      Effort: Pulmonary effort is normal. No respiratory distress. Breath sounds: Normal breath sounds. No wheezing or rales. Abdominal:      General: There is no distension. Palpations: Abdomen is soft. Tenderness: There is no abdominal tenderness. There is no guarding or rebound. Musculoskeletal:         General: No swelling or tenderness. Normal range of motion. Cervical back: Normal range of motion and neck supple. No rigidity. No muscular tenderness. Skin:     General: Skin is warm and dry. Neurological:      Mental Status: He is alert and oriented to person, place, and time.       Comments: Strength 5/5 and sensation grossly intact to light touch and equal bilaterally throughout all extremities          -------------------------------------------------- RESULTS -------------------------------------------------  I have personally reviewed all laboratory and imaging results for this patient. Results are listed below. LABS:  Labs Reviewed   CBC WITH AUTO DIFFERENTIAL - Abnormal; Notable for the following components:       Result Value    WBC 12.9 (*)     Hemoglobin 11.6 (*)     Hematocrit 35.3 (*)     Platelets 709 (*)     Lymphocytes % 8.7 (*)     Basophils % 3.5 (*)     Neutrophils Absolute 9.03 (*)     Monocytes Absolute 1.29 (*)     Eosinophils Absolute 0.55 (*)     Basophils Absolute 0.45 (*)     All other components within normal limits   BASIC METABOLIC PANEL W/ REFLEX TO MG FOR LOW K - Abnormal; Notable for the following components:    Glucose 111 (*)     All other components within normal limits   PROTIME-INR - Abnormal; Notable for the following components:    Protime 105.3 (*)     INR 9.9 (*)     All other components within normal limits    Narrative:     CALL  [place]  H34 tel. ,  Coag results called to and read back by Maik Hernandez, 10/14/2022 09:48,  by Darren Brenner   PROTIME-INR - Abnormal; Notable for the following components:    Protime 105.9 (*)     INR 9.9 (*)     All other components within normal limits    Narrative:     CALL  [place]  H34 tel. ,  Coag results called to and read back by Dr Jayla Staton, 10/14/2022 14:15, by Darren Brenner   COVID-19, RAPID   TYPE AND SCREEN   PREPARE PLASMA       RADIOLOGY:  Interpreted personally and by Radiologist.  CT SOFT TISSUE NECK W CONTRAST   Final Result   Abnormal retropharyngeal fluid collection consistent with a phlegmon   extending from the skull base to the C7 level measuring 1 cm in thickness. Edematous appearance of the epiglottis concerning for acute epiglottitis. There is mild-to-moderate supraglottic airway narrowing. Diffusely edematous appearance of the oropharynx and hypopharynx consistent   with a severe pharyngitis.       No drainable abscess identified.           ------------------------- NURSING NOTES AND VITALS REVIEWED ---------------------------   The nursing notes within the ED encounter and vital signs as below have been reviewed by myself. BP (!) 152/84   Pulse 87   Resp 20   Ht 6' 1\" (1.854 m)   Wt 290 lb (131.5 kg)   SpO2 100%   BMI 38.26 kg/m²   Oxygen Saturation Interpretation: Normal    The patients available past medical records and past encounters were reviewed. ------------------------------ ED COURSE/MEDICAL DECISION MAKING----------------------  Medications   0.9 % sodium chloride infusion (has no administration in time range)   dexamethasone (PF) (DECADRON) injection 10 mg (has no administration in time range)   iopamidol (ISOVUE-370) 76 % injection 75 mL (75 mLs IntraVENous Given 10/14/22 1003)   dexamethasone (PF) (DECADRON) injection 10 mg (10 mg IntraVENous Given 10/14/22 1144)   ampicillin-sulbactam (UNASYN) 3,000 mg in sodium chloride 0.9 % 100 mL IVPB (mini-bag) (0 mg IntraVENous Stopped 10/14/22 1217)   diphenhydrAMINE (BENADRYL) injection 25 mg (25 mg IntraVENous Given 10/14/22 1323)   famotidine (PEPCID) 20 mg in sodium chloride (PF) 10 mL injection (20 mg IntraVENous Given 10/14/22 1324)   tranexamic acid (CYKLOKAPRON) 1,000 mg in sodium chloride 0.9 % 100 mL IVPB (0 mg IntraVENous Stopped 10/14/22 1351)       Consultations:             ENT    Critical Care: Please note that the withdrawal or failure to initiate urgent interventions for this patient would likely result in a life threatening deterioration or permanent disability. Accordingly this patient received 30 minutes of critical care time, excluding separately billable procedures. Counseling: The emergency provider has spoken with the patient and discussed todays results, in addition to providing specific details for the plan of care and counseling regarding the diagnosis and prognosis. Questions are answered at this time and they are agreeable with the plan. ED Course/Medical Decision Makin y.o. male here with sore throat, odynophagia, and dysphonia. Non-toxic appearing, afebrile, hemodynamically stable, and in no acute distress. Breathing comfortably on room air without respiratory distress. Does have dysphonia. No stridor, drooling. Tolerating oral secretions. Workup notable for leukocytosis, supratherapeutic INR, and CT evidence of RP fluid collection and acute epiglottitis. ENT consulted, performed bedside flexible laryngoscopy which confirmed edematous epiglottis concerning for epiglottitis. Treated w/ benadryl, pepcid, decadron, unasyn, TXA, and FFP. Ddx include lisinopril induced angioedema vs hematoma vs acute infectious epiglottitis. Admitted to medical ICU for further management and airway monitoring.       --------------------------------- IMPRESSION AND DISPOSITION ---------------------------------    IMPRESSION  1. Acute epiglottitis with airway obstruction    2. Supratherapeutic INR        DISPOSITION  Disposition: Admit to CCU/ICU  Patient condition is stable    NOTE: This report was transcribed using voice recognition software.  Every effort was made to ensure accuracy; however, inadvertent computerized transcription errors may be present    Olman Gonzalez MD  Attending Emergency Physician         Olman Gonzalez MD  10/14/22 [id number]

## 2022-10-14 NOTE — H&P
[address]  Internal Medicine Residency Program  History and Physical    Patient:  Ole Lombardi 48 y.o. male MRN: [id number]     Date of Service: 10/14/2022    Hospital Day: 1      Chief complaint: had concerns including Pharyngitis. History of Present Illness     The patient is a 48 y.o. male with a past medical history of tPMHx of HTN, Insulin dependent DM, multiple episodes of DVTs, proximal left leg, s.p insertion IVC filter 9/2021 on chronic anticoagulation with warfarin, presented to the ED complaining of acute onset L sided neck pain, hoarseness of voice since yesterday. Mr. Cyndie Ocampo was in his usual state of health on 2 started noticing pain in his left neck which he described was insidious in onset and has progressively worsened since yesterday. Patient reports he was resting at home when he noticed symptoms. Neck pain is described as radiating from the left ear to the left neck and was sounds with movement and touch, no known relieving factor. Patient said to be tolerating his secretions but with odynophagia. Patient had history of C-spine fusion neck surgery in 2000. He denies any recent upper respiratory tract infection, reflux or indigestion but usually have seasonal allergies which has been well controlled. There was associated hoarseness of voice which has worsened as well. Patient also has associated dysphagia and dyspnea which got worse today necessitating his presentation for evaluation. No associated history of neck swelling and patient denies any family history of angioedema. He denies any fever, chills, cough, sneezing, rhinorrhea, congestion, fatigue or recent weight loss. Denies any new allergic exposures or previous history of similar complaints. Patient is on lisinopril for hypertension control which he has been tolerating fine with no cough.  Also on Coumadin for his long history of hypercoagulable state with recurrent DVTs and history of IVC thrombosis s/p IVC C filter. Patient denies smoking history, alcohol abuse or abuse of illicit substances. At the ED, patient was found to be hemodynamically stable saturating 95% on room air. Initial laboratory work-up showed mild leukocytosis WBC count 12.9 with Hb 11.6, supratherapeutic INR 9.9 and elevated . 3. CT soft tissue neck with contrast showed abnormal retropharyngeal fluid collection consistent with a phlegmon extending from the skull base to the C7 level measuring 1 cm in thickness with edematous epiglottis concerning for acute epiglottitis and acute pharyngitis and mild to moderate supraglottic airway narrowing. Patient was given stat doses of Iopamidol, dexamethasone 10 mg, unasyn, Benadryl with famotidine, tranexamic acid and was transfused with 1 unit fresh frozen plasma. ENT has been consulted for review.     Past Medical History:      Diagnosis Date    Accident 11/2019    stepped on nail rt ft about 1 month ago- healed per patient    Acute thrombosis of inferior vena cava ([address] 10/10/2020    SIMÓN (acute kidney injury) ([address] 2008 apx    kidney bruised due to fall / Surjit Hazy    Allergic rhinitis     Blister of left leg 8/31/2021    Cellulitis of leg, left 8/31/2021    Chronic back pain     Depression     Dermatophytosis 8/31/2021    Diabetes mellitus ([address]     Difficulty sleeping     at times    Displacement of lumbar intervertebral disc without myelopathy     Fibromyalgia     Fractured rib     2008 / healed    H/O seasonal allergies     Head injury 1980'S apx    no residual s/s    History of deep vein thrombosis 8/31/2021    Hyperlipidemia     Hypertension     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    Lymphedema of left leg 8/31/2021    Obesity (BMI 35.0-39.9 without comorbidity)     bmi 39.2  weight 296 #    Osteoarthritis     Recurrent acute deep vein thrombosis (DVT) of left lower extremity ([address] 8/31/2021    S/P IVC filter 8/31/2021    Subtherapeutic international normalized ratio (INR) 8/31/2021    Thoracic or lumbosacral neuritis or radiculitis, unspecified        Past Surgical History:        Procedure Laterality Date    COLONOSCOPY N/A 9/5/2020    COLONOSCOPY WITH BIOPSY performed by Kade Turpin MD at [address]    CT [facility] NEW ACCESS  8/3/2020    CT [facility] NEW ACCESS 8/3/2020 [facility] CT    FOOT SURGERY Right 1985    to treat shattered bones    HERNIA REPAIR  2001    DOUBLE HERNIA    HERNIA REPAIR Right 12/9/2019    LAPAROSCOPIC RIGHT INGUINAL HERNIA REPAIR, MESH 10x15 cm PLACEMENT performed by Chandrakant Akhtar MD at [place] [address] Left 4/11/2016    ILIAC ARTERY STENT INSERTION N/A 10/11/2020    S/P ILIAC STENT PLACEMENT VISUALIZATION performed by Arie Bella MD at [address] [place] [address] N/A 9/18/2020    LAPAROTOMY EXPLORATORY, CHOLECYSTECTOMY, BOWEL RESECTION, right darian colectomy, partial omentectomy, and splenectomy performed by Kade Turpin MD at [address] [facility] [address] FLX DX W/[address] PFRMD N/A 5/7/2018    COLONOSCOPY DIAGNOSTIC performed by Richard Daniel MD at [address] Left 2014    Dr. Clementine Quinn ARTHROSCOPY Left 11 21 14    SHOULDER SURGERY  2001 &2007    RIGHT AND LEFT repair of tears    THROMBECTOMY / EMBOLECTOMY FEMORAL Left 1/1/2021    LEFT LOWER EXTREMITY, VENOGRAM, FOLLOW UP POSSIBLE REMOVAL LYSIS CATHETER performed by Arie Bella MD at [address] / EMBOLECTOMY FEMORAL Left 1/2/2021    LEFT LOWER EXTREMITY VENOGRAM performed by Arie Bella MD at [address] [facility] Right 10/31/2016    Total right hip  Jones Portillo MD    UPPER GASTROINTESTINAL ENDOSCOPY N/A 9/4/2020    EGD DIAGNOSTIC ONLY performed by Rosanne Chapman MD at [address] [facility] Left 1/3/2021    LEFT LOWER EXTREMITY VENOGRAM, PLACEMENT OF NEW LYSIS CATHETER performed by Arie Bella MD at [address] HERNIA REPAIR N/A 5/12/2022    OPEN INCISIONAL HERNIA REPAIR WITH MESH performed by Sayda Norman MD at [facility]  2007    LEFT WRIST       Medications Prior to Admission:    Prior to Admission medications    Medication Sig Start Date End Date Taking? Authorizing Provider   cyclobenzaprine (FLEXERIL) 10 MG tablet Take 10 mg by mouth 3 times daily as needed for Muscle spasms   Yes Historical Provider, MD   fluticasone (FLONASE) 50 MCG/ACT nasal spray 1 spray by Each Nostril route nightly 10/11/22   Duard Day, PA-C   lisinopril (PRINIVIL;ZESTRIL) 5 MG tablet take 1 tablet by mouth once daily 10/5/22   Duard Day, PA-C   warfarin (COUMADIN) 2.5 MG tablet take as directed 9/27/22   Duard Day, PA-C   warfarin (COUMADIN) 5 MG tablet take 1 tablet by mouth every evening with 2.5 MG TABLET FOR A TOTAL DOSE OF 7.5 MG DAILY 9/27/22   Duard Day, PA-C   loperamide (IMODIUM) 2 MG capsule take 1 capsule by mouth four times a day if needed for diarrhea 9/6/22   Duard Day, PA-C   Continuous Blood Gluc Sensor (FREESTYLE JOANIE 14 DAY SENSOR) MISC 1 Device by Does not apply route continuous 8/22/22   Duard Day, PA-C   gabapentin (NEURONTIN) 300 MG capsule Take 1 capsule by mouth 3 times daily for 180 days.  Intended supply: 30 days 8/19/22 2/15/23  Duard Day, PA-C   insulin glargine (LANTUS) 100 UNIT/ML injection vial Inject 25 Units into the skin nightly 8/12/22   Duard Day, PA-C   aspirin 81 MG chewable tablet Take 1 tablet by mouth daily 5/15/22   Sergio Bocanegra MD   sennosides-docusate sodium (SENOKOT-S) 8.6-50 MG tablet Take 1 tablet by mouth 2 times daily 5/14/22   Sergio Bocanegra MD   Insulin Syringe-Needle U-100 30G X 5/16\" 1 ML MISC 1 each by Does not apply route daily Use as directed 3/8/22   Duard Day, PA-SUNITA   metoprolol succinate (TOPROL XL) 25 MG extended release tablet Take 1 tablet by mouth daily  Patient taking differently: Take 25 mg by mouth nightly 2/14/22 Neymar Mitchell PA-C   Continuous Blood Gluc  (FREESTYLE JOANIE READER) SAMARIA 1 Device by Does not apply route continuous 2/14/22   Neymar Mitchell PA-C       Allergies:  Seasonal and Tramadol    Social History:   TOBACCO:   reports that he has never smoked. His smokeless tobacco use includes chew. ETOH:   reports that he does not currently use alcohol. Family History:       Problem Relation Age of Onset    Hypertension Mother     Arthritis Father     Diabetes Father     Cancer Maternal Grandfather         Skin    Cancer Paternal Uncle         skin    Diabetes Paternal Grandfather     Heart Disease Maternal Grandmother     Stroke Maternal Aunt        REVIEW OF SYSTEMS:    Constitutional: No fever, no chills, no change in weight; good appetite  HEENT: No blurred vision, no ear problems, no sore throat, no rhinorrhea. Respiratory: No cough, no sputum production, no pleuritic chest pain, no shortness of breath  Cardiology: No angina, no dyspnea on exertion, no paroxysmal nocturnal dyspnea, no orthopnea, no palpitation, no leg swelling. Gastroenterology: No dysphagia, no reflux; no abdominal pain, no nausea or vomiting; no constipation or diarrhea.  No hematochezia   Genitourinary: No dysuria, no frequency, hesitancy; no hematuria  Musculoskeletal: no joint pain, no myalgia, no change in range of movement  Neurology: no focal weakness in extremities, no slurred speech, no double vision, no tingling or numbness sensation  Endocrinology: no temperature intolerance, no polyphagia, polydipsia or polyuria  Hematology: no increased bleeding, no bruising, no lymphadenopathy  Skin: no skin changes noticed by patient  Psychology: no depressed mood, no suicidal ideation    Physical Exam   Vitals: /79   Pulse 75   Temp 99.2 °F (37.3 °C) (Oral)   Resp 20   Ht 6' 1\" (1.854 m)   Wt 290 lb (131.5 kg)   SpO2 96%   BMI 38.26 kg/m²     General Appearance: alert and oriented to person, place and time, in mild painful distress  Skin: warm and dry, no rash or erythema  Head: normocephalic and atraumatic  Eyes: pupils equal, round, and reactive to light, extraocular eye movements intact, conjunctivae normal  ENT: oropharynx clear and moist with normal mucous membranes, good dentition, and oropharynx erythematous without exudate  Neck: Deferred due to pain  Pulmonary/Chest: wheezing present-worse on the right hemithorax  Cardiovascular: normal rate, normal S1 and S2, and intact distal pulses  Abdomen: soft, non-tender, non-distended, normal bowel sounds, no masses or organomegaly  Extremities: no cyanosis and no clubbing  Musculoskeletal: normal range of motion, no joint swelling, deformity or tenderness  Neurologic: gait and coordination normal and speech normal   Labs and Imaging Studies   Basic Labs  Recent Labs     10/14/22  0716      K 4.7      CO2 28   BUN 11   CREATININE 0.9   GLUCOSE 111*   CALCIUM 9.1       Recent Labs     10/14/22  0716   WBC 12.9*   RBC 3.90   HGB 11.6*   HCT 35.3*   MCV 90.5   MCH 29.7   MCHC 32.9   RDW 13.6   *   MPV 9.3         Imaging Studies:  CT SOFT TISSUE NECK W CONTRAST   Final Result   Abnormal retropharyngeal fluid collection consistent with a phlegmon   extending from the skull base to the C7 level measuring 1 cm in thickness. Edematous appearance of the epiglottis concerning for acute epiglottitis. There is mild-to-moderate supraglottic airway narrowing. Diffusely edematous appearance of the oropharynx and hypopharynx consistent   with a severe pharyngitis. No drainable abscess identified. EKG: normal sinus rhythm, unchanged from previous tracings. Resident's Assessment and Plan     Assessment and Plan:    Acute epiglottitis s/p ENT endoscopy in ED, ? Angioedema vs infectious/viral  CT soft tissue neck 10/14/2022 - showed retropharyngeal fluid collection, s/o phlegmon extending from skull base to C7, 1 cm with acute epiglottitis and acute pharyngitis and mild to moderate supraglottic airway narrowing  Flexible nasopharyngeal endoscopy - showed significant amount of secretions with edematous/omega shaped epiglottis and retropharyngeal swelling as per otolaryngology documentation  Patient could be very difficult intubation secondary to epiglottic swelling and could require tracheostomy as per otolaryngology  Plan:  Otolaryngology on board, appreciate inputs  Follow otolaryngology recommendations  Admit CDU for close airway observation  Mental respiratory status/airway closely, keep low threshold for tracheostomy  Continue IV Decadron 10 mg daily 8 hours  Continue Pepcid  Continue Benadryl  Continue Unasyn  Discontinue lisinopril and all ACEI/ARB's  Keep flexible nasolaryngoscopy at bedside    Leukocytosis likely reactive vs infection   Patient is hemodynamically stable  WBC count 12.9  Plan:  Infection work-up regardless  Obtain blood cultures, ESR/CRP, lactic acid, procalcitonin  Monitor CBC daily    Supra therapeutic INR, (9.9) in the setting of chronic Coumadin  S/p TXA, FFP x 2  Repeat INR still 9.9, previous in 3.s at home  Plan:  Monitor INR every 12 hours  Consider vitamin K if INR>10  Consider [facility] if any surgical procedure with supratherapeutic INR    Normocytic anemia with thrombocytosis r/o hemoconcentration  Hg 11.9, platelets 999  Plan:  Monitor CBC daily  Type and crossmatch blood    Hx recurrent DVTs  On chronic coumadin at home  INR currently supratherapeutic  Plan:  Hold Coumadin for now in light of supratherapeutic INR  Monitor INR 12 hourly    Insulin-dependent diabetes melitis, well controlled  On home Lantus 25 units at home  Plan:  POCT every 6 hours, goal 140-180  Hypoglycemic protocol    HTN, stable  On home Toprol XL 25 OD  Plan:  Hold BP meds for now  --------------------------------------------------------------------------------------------------------------------------------------------------  DM neuropathy, on home Gabapentin 300 TID  Hx c-spine fusion surgery, 2000      PT/OT evaluation: Not indicated  DVT prophylaxis/ GI prophylaxis: SCD/-  Disposition: admit to CCU    Dusty Fitzgerald MD, PGY-1  Attending physician: Dr. Pascual Hudson

## 2022-10-14 NOTE — ED NOTES
Present while patient was educated on plasma administration and patient signed form consenting to treatment      Candace Hanley RN  10/14/22 [id number]

## 2022-10-14 NOTE — H&P
History & Physicial  10/14/22  Primary Care:  Laci Caruso PA-C  [address]  / [place] [address]        Chief Complaint   Patient presents with    Pharyngitis       HPI:    Presented to ED with Epiglottitis. I am using History per Dr New Espinoza. My team will admit to ICU w consultation. Prior to Visit Medications    Medication Sig Taking? Authorizing Provider   cyclobenzaprine (FLEXERIL) 10 MG tablet Take 10 mg by mouth 3 times daily as needed for Muscle spasms Yes Historical Provider, MD   fluticasone (FLONASE) 50 MCG/ACT nasal spray 1 spray by Each Nostril route nightly  Renea Ganser, PA-C   lisinopril (PRINIVIL;ZESTRIL) 5 MG tablet take 1 tablet by mouth once daily  Renea Ganser, PA-C   warfarin (COUMADIN) 2.5 MG tablet take as directed  Renea Ganser, PA-C   warfarin (COUMADIN) 5 MG tablet take 1 tablet by mouth every evening with 2.5 MG TABLET FOR A TOTAL DOSE OF 7.5 MG DAILY  Renea Ganser, PA-C   loperamide (IMODIUM) 2 MG capsule take 1 capsule by mouth four times a day if needed for diarrhea  Renea Ganser, PA-C   Continuous Blood Gluc Sensor (FREESTYLE JOANIE 14 DAY SENSOR) MISC 1 Device by Does not apply route continuous  Renea Ganser, PA-C   gabapentin (NEURONTIN) 300 MG capsule Take 1 capsule by mouth 3 times daily for 180 days.  Intended supply: 30 days  Renea Ganser, PA-C   insulin glargine (LANTUS) 100 UNIT/ML injection vial Inject 25 Units into the skin nightly  Renea Ganser, PA-C   aspirin 81 MG chewable tablet Take 1 tablet by mouth daily  Dani Kevin MD   sennosides-docusate sodium (SENOKOT-S) 8.6-50 MG tablet Take 1 tablet by mouth 2 times daily  Dani Kevin MD   Insulin Syringe-Needle U-100 30G X 5/16\" 1 ML MISC 1 each by Does not apply route daily Use as directed  Renea Ganser, PA-C   metoprolol succinate (TOPROL XL) 25 MG extended release tablet Take 1 tablet by mouth daily  Patient taking differently: Take 25 mg by mouth nightly  Renea Ganser, PA-C   Continuous Blood Gluc  ([employer] JOANIE READER) SAMARIA 1 Device by Does not apply route continuous  Gay Swann PA-C     Social History     Tobacco Use    Smoking status: Never    Smokeless tobacco: Current     Types: Chew   Vaping Use    Vaping Use: Never used   Substance Use Topics    Alcohol use: Not Currently     Alcohol/week: 0.0 standard drinks    Drug use: No     Family History   Problem Relation Age of Onset    Hypertension Mother     Arthritis Father     Diabetes Father     Cancer Maternal Grandfather         Skin    Cancer Paternal Uncle         skin    Diabetes Paternal Grandfather     Heart Disease Maternal Grandmother     Stroke Maternal Aunt      Past Surgical History:   Procedure Laterality Date    COLONOSCOPY N/A 2020    COLONOSCOPY WITH BIOPSY performed by Lilliam Marvin MD at [facility] ENDOSCOPY    CT [facility] NEW ACCESS  8/3/2020    CT [facility]TC NEW ACCESS 8/3/2020 [facility] CT    FOOT SURGERY Right     to treat shattered bones    HERNIA REPAIR      DOUBLE HERNIA    HERNIA REPAIR Right 2019    LAPAROSCOPIC RIGHT INGUINAL HERNIA REPAIR, MESH 10x15 cm PLACEMENT performed by Krysta Pena MD at [place] [address] Left 2016    ILIAC ARTERY STENT INSERTION N/A 10/11/2020    S/P ILIAC STENT PLACEMENT VISUALIZATION performed by Lasha Avalos MD at [address] [place] [address] N/A 2020    LAPAROTOMY EXPLORATORY, CHOLECYSTECTOMY, BOWEL RESECTION, right darian colectomy, partial omentectomy, and splenectomy performed by Lilliam Marvin MD at [address] [facility] FLX DX W/MARIA ALEJAN[address] 1980 PFRMD N/A 2018    COLONOSCOPY DIAGNOSTIC performed by Thea Barfield MD at [email or web address] Left     Dr. Ellis Barajas ARTHROSCOPY Left 11  14    SHOULDER SURGERY   &    RIGHT AND LEFT repair of tears    THROMBECTOMY / EMBOLECTOMY FEMORAL Left 2021    LEFT LOWER EXTREMITY, VENOGRAM, FOLLOW UP POSSIBLE REMOVAL LYSIS CATHETER performed by Arie Bella MD at [address] / EMBOLECTOMY FEMORAL Left 1/2/2021    LEFT LOWER EXTREMITY VENOGRAM performed by Arie Bella MD at [facility] Right 10/31/2016    Total right hip  Joens Portillo MD    UPPER GASTROINTESTINAL ENDOSCOPY N/A 9/4/2020    EGD DIAGNOSTIC ONLY performed by Rosanne Chapman MD at [address] [facility] [address] Left 1/3/2021    LEFT LOWER EXTREMITY VENOGRAM, PLACEMENT OF NEW LYSIS CATHETER performed by Arie Bella MD at [place] [address] N/A 5/12/2022    OPEN [address] performed by Kade Turpin MD at [address] [facility]  2007    LEFT WRIST     Past Medical History:   Diagnosis Date    Accident 11/2019    stepped on nail rt ft about 1 month ago- healed per patient    Acute thrombosis of inferior vena cava ([address] 10/10/2020    SIMÓN (acute kidney injury) ([address] 2008 apx    kidney bruised due to fall / Sallyann Oven    Allergic rhinitis     Blister of left leg 8/31/2021    Cellulitis of leg, left 8/31/2021    Chronic back pain     Depression     Dermatophytosis 8/31/2021    Diabetes mellitus ([address]     Difficulty sleeping     at times    Displacement of lumbar intervertebral disc without myelopathy     Fibromyalgia     Fractured rib     2008 / healed    H/O seasonal allergies     Head injury 1980'S apx    no residual s/s    History of deep vein thrombosis 8/31/2021    Hyperlipidemia     Hypertension     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    Lymphedema of left leg 8/31/2021    Obesity (BMI 35.0-39.9 without comorbidity)     bmi 39.2  weight 296 #    Osteoarthritis     Recurrent acute deep vein thrombosis (DVT) of left lower extremity ([address] 8/31/2021    S/P IVC filter 8/31/2021    Subtherapeutic international normalized ratio (INR) 8/31/2021    Thoracic or lumbosacral neuritis or radiculitis, unspecified      Review of Systems  Physical Exam  Neck: Comments: Swollen  stridor  Cardiovascular:      Rate and Rhythm: Regular rhythm. Heart sounds: Normal heart sounds. Pulmonary:      Breath sounds: Stridor present. Principal Problem:    Acute epiglottitis with airway obstruction  Active Problems:    Prolonged INR    Class 2 severe obesity due to excess calories with serious comorbidity in adult [facility])    Essential hypertension    Other pulmonary embolism without acute cor pulmonale (HCC)  Resolved Problems:    * No resolved hospital problems.  *    Plan:  Already scoped by ENT  Antibiotics   Steroids  Reversal of INR  ICU admission    DVT Prophylaxis: warfarin  Code Status:full    Electronically signed by Estela Jeffries MD on 10/14/2022 at 1:38 PM

## 2022-10-14 NOTE — ED NOTES
Called blood bank for second unit of plasma and they stated they did not have an order for a second unit of plasma to release.  notifying attending      Iqra Richard RN  10/14/22 [id number]

## 2022-10-15 LAB
ALBUMIN SERPL-MCNC: 4 G/DL (ref 3.5–5.2)
ALP BLD-CCNC: 131 U/L (ref 40–129)
ALT SERPL-CCNC: 37 U/L (ref 0–40)
ANION GAP SERPL CALCULATED.3IONS-SCNC: 12 MMOL/L (ref 7–16)
ANION GAP SERPL CALCULATED.3IONS-SCNC: 13 MMOL/L (ref 7–16)
AST SERPL-CCNC: 43 U/L (ref 0–39)
BASOPHILS ABSOLUTE: 0.02 E9/L (ref 0–0.2)
BASOPHILS RELATIVE PERCENT: 0.2 % (ref 0–2)
BILIRUB SERPL-MCNC: 0.6 MG/DL (ref 0–1.2)
BUN BLDV-MCNC: 20 MG/DL (ref 6–20)
BUN BLDV-MCNC: 23 MG/DL (ref 6–20)
CALCIUM SERPL-MCNC: 9.2 MG/DL (ref 8.6–10.2)
CALCIUM SERPL-MCNC: 9.7 MG/DL (ref 8.6–10.2)
CHLORIDE BLD-SCNC: 97 MMOL/L (ref 98–107)
CHLORIDE BLD-SCNC: 97 MMOL/L (ref 98–107)
CO2: 25 MMOL/L (ref 22–29)
CO2: 25 MMOL/L (ref 22–29)
CREAT SERPL-MCNC: 0.9 MG/DL (ref 0.7–1.2)
CREAT SERPL-MCNC: 1 MG/DL (ref 0.7–1.2)
EOSINOPHILS ABSOLUTE: 0 E9/L (ref 0.05–0.5)
EOSINOPHILS RELATIVE PERCENT: 0 % (ref 0–6)
GFR AFRICAN AMERICAN: >60
GFR AFRICAN AMERICAN: >60
GFR NON-AFRICAN AMERICAN: >60 ML/MIN/1.73
GFR NON-AFRICAN AMERICAN: >60 ML/MIN/1.73
GLUCOSE BLD-MCNC: 185 MG/DL (ref 74–99)
GLUCOSE BLD-MCNC: 232 MG/DL (ref 74–99)
HCT VFR BLD CALC: 36.3 % (ref 37–54)
HEMOGLOBIN: 12 G/DL (ref 12.5–16.5)
IMMATURE GRANULOCYTES #: 0.03 E9/L
IMMATURE GRANULOCYTES %: 0.3 % (ref 0–5)
INR BLD: 5.6
LACTIC ACID: 1.7 MMOL/L (ref 0.5–2.2)
LYMPHOCYTES ABSOLUTE: 0.71 E9/L (ref 1.5–4)
LYMPHOCYTES RELATIVE PERCENT: 7.9 % (ref 20–42)
MCH RBC QN AUTO: 29.9 PG (ref 26–35)
MCHC RBC AUTO-ENTMCNC: 33.1 % (ref 32–34.5)
MCV RBC AUTO: 90.5 FL (ref 80–99.9)
METER GLUCOSE: 193 MG/DL (ref 74–99)
METER GLUCOSE: 214 MG/DL (ref 74–99)
METER GLUCOSE: 220 MG/DL (ref 74–99)
MONOCYTES ABSOLUTE: 0.2 E9/L (ref 0.1–0.95)
MONOCYTES RELATIVE PERCENT: 2.2 % (ref 2–12)
NEUTROPHILS ABSOLUTE: 8.05 E9/L (ref 1.8–7.3)
NEUTROPHILS RELATIVE PERCENT: 89.4 % (ref 43–80)
PDW BLD-RTO: 13.4 FL (ref 11.5–15)
PLATELET # BLD: 645 E9/L (ref 130–450)
PMV BLD AUTO: 9.5 FL (ref 7–12)
POTASSIUM REFLEX MAGNESIUM: 5.4 MMOL/L (ref 3.5–5)
POTASSIUM SERPL-SCNC: 4.4 MMOL/L (ref 3.5–5)
PROTHROMBIN TIME: 60.1 SEC (ref 9.3–12.4)
RBC # BLD: 4.01 E12/L (ref 3.8–5.8)
SODIUM BLD-SCNC: 134 MMOL/L (ref 132–146)
SODIUM BLD-SCNC: 135 MMOL/L (ref 132–146)
TOTAL PROTEIN: 8 G/DL (ref 6.4–8.3)
WBC # BLD: 9 E9/L (ref 4.5–11.5)

## 2022-10-15 PROCEDURE — 6370000000 HC RX 637 (ALT 250 FOR IP)

## 2022-10-15 PROCEDURE — 83605 ASSAY OF LACTIC ACID: CPT

## 2022-10-15 PROCEDURE — 85025 COMPLETE CBC W/AUTO DIFF WBC: CPT

## 2022-10-15 PROCEDURE — 36415 COLL VENOUS BLD VENIPUNCTURE: CPT

## 2022-10-15 PROCEDURE — 80053 COMPREHEN METABOLIC PANEL: CPT

## 2022-10-15 PROCEDURE — 2580000003 HC RX 258: Performed by: INTERNAL MEDICINE

## 2022-10-15 PROCEDURE — 80048 BASIC METABOLIC PNL TOTAL CA: CPT

## 2022-10-15 PROCEDURE — 6370000000 HC RX 637 (ALT 250 FOR IP): Performed by: INTERNAL MEDICINE

## 2022-10-15 PROCEDURE — 2580000003 HC RX 258

## 2022-10-15 PROCEDURE — 6360000002 HC RX W HCPCS: Performed by: OTOLARYNGOLOGY

## 2022-10-15 PROCEDURE — 82962 GLUCOSE BLOOD TEST: CPT

## 2022-10-15 PROCEDURE — 85610 PROTHROMBIN TIME: CPT

## 2022-10-15 PROCEDURE — S5553 INSULIN LONG ACTING 5 U: HCPCS | Performed by: INTERNAL MEDICINE

## 2022-10-15 PROCEDURE — 99232 SBSQ HOSP IP/OBS MODERATE 35: CPT | Performed by: OTOLARYNGOLOGY

## 2022-10-15 PROCEDURE — 99233 SBSQ HOSP IP/OBS HIGH 50: CPT | Performed by: INTERNAL MEDICINE

## 2022-10-15 PROCEDURE — 2000000000 HC ICU R&B

## 2022-10-15 RX ORDER — DIPHENHYDRAMINE HYDROCHLORIDE 50 MG/ML
25 INJECTION INTRAMUSCULAR; INTRAVENOUS NIGHTLY PRN
Status: DISCONTINUED | OUTPATIENT
Start: 2022-10-16 | End: 2022-10-21 | Stop reason: HOSPADM

## 2022-10-15 RX ORDER — INSULIN LISPRO 100 [IU]/ML
0-8 INJECTION, SOLUTION INTRAVENOUS; SUBCUTANEOUS
Status: DISCONTINUED | OUTPATIENT
Start: 2022-10-15 | End: 2022-10-16

## 2022-10-15 RX ORDER — INSULIN LISPRO 100 [IU]/ML
0-4 INJECTION, SOLUTION INTRAVENOUS; SUBCUTANEOUS NIGHTLY
Status: DISCONTINUED | OUTPATIENT
Start: 2022-10-15 | End: 2022-10-16

## 2022-10-15 RX ORDER — INSULIN GLARGINE-YFGN 100 [IU]/ML
10 INJECTION, SOLUTION SUBCUTANEOUS NIGHTLY
Status: DISCONTINUED | OUTPATIENT
Start: 2022-10-15 | End: 2022-10-15

## 2022-10-15 RX ORDER — INSULIN GLARGINE-YFGN 100 [IU]/ML
5 INJECTION, SOLUTION SUBCUTANEOUS NIGHTLY
Status: DISCONTINUED | OUTPATIENT
Start: 2022-10-15 | End: 2022-10-16

## 2022-10-15 RX ORDER — INSULIN GLARGINE-YFGN 100 [IU]/ML
5 INJECTION, SOLUTION SUBCUTANEOUS NIGHTLY
Status: DISCONTINUED | OUTPATIENT
Start: 2022-10-15 | End: 2022-10-15

## 2022-10-15 RX ORDER — HYDRALAZINE HYDROCHLORIDE 20 MG/ML
10 INJECTION INTRAMUSCULAR; INTRAVENOUS EVERY 6 HOURS PRN
Status: DISCONTINUED | OUTPATIENT
Start: 2022-10-15 | End: 2022-10-21 | Stop reason: HOSPADM

## 2022-10-15 RX ORDER — DEXTROSE AND SODIUM CHLORIDE 5; .45 G/100ML; G/100ML
INJECTION, SOLUTION INTRAVENOUS CONTINUOUS
Status: ACTIVE | OUTPATIENT
Start: 2022-10-15 | End: 2022-10-15

## 2022-10-15 RX ORDER — DEXTROSE AND SODIUM CHLORIDE 5; .45 G/100ML; G/100ML
INJECTION, SOLUTION INTRAVENOUS CONTINUOUS
Status: DISCONTINUED | OUTPATIENT
Start: 2022-10-15 | End: 2022-10-15

## 2022-10-15 RX ORDER — LABETALOL HYDROCHLORIDE 5 MG/ML
10 INJECTION, SOLUTION INTRAVENOUS EVERY 6 HOURS
Status: DISCONTINUED | OUTPATIENT
Start: 2022-10-15 | End: 2022-10-17

## 2022-10-15 RX ADMIN — DEXAMETHASONE SODIUM PHOSPHATE 8 MG: 10 INJECTION, SOLUTION INTRAMUSCULAR; INTRAVENOUS at 13:08

## 2022-10-15 RX ADMIN — DEXTROSE AND SODIUM CHLORIDE: 5; 450 INJECTION, SOLUTION INTRAVENOUS at 08:46

## 2022-10-15 RX ADMIN — INSULIN LISPRO 2 UNITS: 100 INJECTION, SOLUTION INTRAVENOUS; SUBCUTANEOUS at 13:08

## 2022-10-15 RX ADMIN — INSULIN GLARGINE-YFGN 5 UNITS: 100 INJECTION, SOLUTION SUBCUTANEOUS at 21:10

## 2022-10-15 RX ADMIN — DEXAMETHASONE SODIUM PHOSPHATE 8 MG: 10 INJECTION, SOLUTION INTRAMUSCULAR; INTRAVENOUS at 03:53

## 2022-10-15 RX ADMIN — ACETAMINOPHEN 650 MG: 325 TABLET, FILM COATED ORAL at 18:45

## 2022-10-15 RX ADMIN — SODIUM CHLORIDE, PRESERVATIVE FREE 10 ML: 5 INJECTION INTRAVENOUS at 21:08

## 2022-10-15 RX ADMIN — DEXAMETHASONE SODIUM PHOSPHATE 8 MG: 10 INJECTION, SOLUTION INTRAMUSCULAR; INTRAVENOUS at 21:08

## 2022-10-15 ASSESSMENT — PAIN SCALES - GENERAL
PAINLEVEL_OUTOF10: 6
PAINLEVEL_OUTOF10: 0
PAINLEVEL_OUTOF10: 6

## 2022-10-15 NOTE — PROGRESS NOTES
[facility]  Internal Medicine Residency Program  Progress Note - House Team     Patient:  Sherie Owens 48 y.o. male MRN: [id number]     Date of Service: 10/15/2022     CC: Left neck pain and hoarseness  Overnight events: Patient was commenced on 5 L intranasal oxygen for desaturation    Subjective     Patient was seen and examined this morning at bedside in no acute distress. Patient reports improvement in his voice, however still complains of left-sided neck pain. He denies shortness of breath at this time. Labs this morning showed improvement in INR now down to 5.6. Hyperkalemia K5.4 was also noted with CRP elevated 5.2 and ESR 49. Other labs pending at this time. Otolaryngology had another scope this morning which showed improvement in the epiglottal swelling. Plan is to continue ICU care and medical management. Objective     Physical Exam:  Vitals: /73   Pulse 89   Temp 98.6 °F (37 °C) (Oral)   Resp 15   Ht 6' 1\" (1.854 m)   Wt 290 lb (131.5 kg)   SpO2 98%   BMI 38.26 kg/m²     I & O - 24hr: No intake/output data recorded. General Appearance: alert, appears stated age, and cooperative  HEENT:  Head: Normocephalic, no lesions, without obvious abnormality. Neck: supple, symmetrical, trachea midline, lateral neck tenderness appreciated  Lung: Wheezing quite improved this morning  Heart: S1, S2 normal.  Tachycardic  Abdomen:  Soft, tenderness to left lumbar area noted not present from yesterday  Extremities:  extremities normal, atraumatic, no cyanosis or edema  Musculokeletal: No joint swelling, no muscle tenderness. ROM normal in all joints of extremities.    Neurologic: Mental status: Alert, oriented, thought content appropriate    Pertinent Labs & Imaging Studies     CBC with Differential:    Lab Results   Component Value Date/Time    WBC 9.0 10/15/2022 05:10 AM    RBC 4.01 10/15/2022 05:10 AM    HGB 12.0 10/15/2022 05:10 AM    HCT 36.3 10/15/2022 05:10 AM  10/15/2022 05:10 AM    MCV 90.5 10/15/2022 05:10 AM    MCH 29.9 10/15/2022 05:10 AM    MCHC 33.1 10/15/2022 05:10 AM    RDW 13.4 10/15/2022 05:10 AM    NRBC 0.9 11/19/2020 06:38 AM    SEGSPCT 80 11/26/2013 01:45 PM    LYMPHOPCT 7.9 10/15/2022 05:10 AM    MONOPCT 2.2 10/15/2022 05:10 AM    BASOPCT 0.2 10/15/2022 05:10 AM    MONOSABS 0.20 10/15/2022 05:10 AM    LYMPHSABS 0.71 10/15/2022 05:10 AM    EOSABS 0.00 10/15/2022 05:10 AM    BASOSABS 0.02 10/15/2022 05:10 AM     CMP:    Lab Results   Component Value Date/Time     10/15/2022 05:10 AM    K 5.4 10/15/2022 05:10 AM    CL 97 10/15/2022 05:10 AM    CO2 25 10/15/2022 05:10 AM    BUN 20 10/15/2022 05:10 AM    CREATININE 0.9 10/15/2022 05:10 AM    GFRAA >60 10/15/2022 05:10 AM    LABGLOM >60 10/15/2022 05:10 AM    GLUCOSE 185 10/15/2022 05:10 AM    GLUCOSE 125 05/11/2012 08:42 AM    PROT 8.0 10/15/2022 05:10 AM    LABALBU 4.0 10/15/2022 05:10 AM    LABALBU 4.8 05/11/2012 08:42 AM    CALCIUM 9.2 10/15/2022 05:10 AM    BILITOT 0.6 10/15/2022 05:10 AM    ALKPHOS 131 10/15/2022 05:10 AM    AST 43 10/15/2022 05:10 AM    ALT 37 10/15/2022 05:10 AM     PT/INR:    Lab Results   Component Value Date/Time    PROTIME 60.1 10/15/2022 05:10 AM    PROTIME 35.6 08/19/2022 01:02 PM    INR 5.6 10/15/2022 05:10 AM     Warfarin PT/INR:  No components found for: PTPATWAR, PTINRWAR    CT SOFT TISSUE NECK W CONTRAST   Final Result   Abnormal retropharyngeal fluid collection consistent with a phlegmon   extending from the skull base to the C7 level measuring 1 cm in thickness. Edematous appearance of the epiglottis concerning for acute epiglottitis. There is mild-to-moderate supraglottic airway narrowing. Diffusely edematous appearance of the oropharynx and hypopharynx consistent   with a severe pharyngitis. No drainable abscess identified.               Resident's Assessment and Plan     Assessment and Plan:    Acute epiglottitis s/p ENT endoscopy in ED, ? Angioedema vs infectious/viral  CT soft tissue neck 10/14/2022 - showed retropharyngeal fluid collection, s/o phlegmon extending from skull base to C7, 1 cm with acute epiglottitis and acute pharyngitis and mild to moderate supraglottic airway narrowing  Flexible nasopharyngeal endoscopy - showed significant amount of secretions with edematous/omega shaped epiglottis and retropharyngeal swelling as per otolaryngology documentation  Patient could be very difficult intubation secondary to epiglottic swelling and could require tracheostomy as per otolaryngology  Plan:  Otolaryngology on board, appreciate inputs  Follow otolaryngology recommendations  Admit CDU for close airway observation  Mental respiratory status/airway closely, keep low threshold for tracheostomy  Continue IV Decadron 10 mg daily 8 hours  Continue Pepcid  Continue Benadryl  Continue Unasyn  Discontinue lisinopril and all ACEI/ARB's  Keep flexible nasolaryngoscopy at bedside     Leukocytosis likely reactive vs infection, resolving  Patient is hemodynamically stable  WBC count 12.9>9.0  Plan:  Infection work-up regardless  Obtain blood cultures, ESR/CRP, lactic acid, procalcitonin  Monitor CBC daily     Supra therapeutic INR, (9.9) in the setting of chronic Coumadin  S/p TXA, FFP x 2  Repeat INR still 9.9, previous in 3.s at home  And now 5.6 today  Plan:  Continue to monitor INR every 12 hours  Consider vitamin K if INR>10  Consider [facility] if any surgical procedure with supratherapeutic INR     Normocytic anemia with thrombocytosis r/o hemoconcentration  Hg 11.9, platelets 993  Plan:  Monitor CBC daily  Type and crossmatch blood     Hx recurrent DVTs  On chronic coumadin at home  INR currently supratherapeutic  Plan:  Hold Coumadin for now in light of supratherapeutic INR  Monitor INR 12 hourly     Insulin-dependent diabetes melitis, well controlled  On home Lantus 25 units at home  Plan:  POCT every 6 hours, goal 140-180  Hypoglycemic protocol HTN, stable  On home Toprol XL 25 OD  Plan:  Hold BP meds for now  --------------------------------------------------------------------------------------------------------------------------------------------------  DM neuropathy, on home Gabapentin 300 TID  Hx c-spine fusion surgery, 2000        PT/OT evaluation: Not indicated  DVT prophylaxis/ GI prophylaxis: No indication  Disposition: admit to CCU    Caitlyn Samuel MD, PGY-1  Attending physician: Dr. Carlos Alberto Grimaldo, [facility]  Internal Medicine Clinic    Attending Physician Statement:  Jaquelin Prescott M.D., F.A.C.P. I have discussed the case, including pertinent history and exam findings with the resident/NP. I have seen and examined the patient and the key elements of the encounter have been performed by me. I agree with the resident ROS, PMHx, PSHx, meds reviewed and assessment, plan and orders as documented by the resident/NP      Hospital charts reviewed, including other providers notes, relevant labs and imaging. One throat ? L neck pain +voice change  Difficulty swallowing- painful  \"Epiglottitis\"- doubt infectious? ASO? Negative viral panel  - also covered for ACE/bradykinin assoc - FFP/TXA  Normal nasopharynx, large area of ecchymoses and edema to the left side of the epiglottis, normal tongue base, retropharyngeal swelling is much improved  He developed vocal cord swelling, ? \"Due to thin blood and hematoma\"- disruption from coughing  ? Or other etiology +coumadin  Vs clot in vocal cord           Hypoxic respiratory failure-- on o2 now  +stridor-- on 5LNC  -- on decadron   (no racemic epi)    L sided flank pain, rule out hematoma (didn't get lovenox shot?)    -holding coumadin - hx of multiple DVTs.   last clot 2021  So they stopped eliquis,, stents and lysis of clots in past    DM2 controlled prior to admission   aic 6.1-- now on decadron  >50% of time spent coordinating care with other providers and/or counseling patient/family  Remainder of medical problems as per resident note.

## 2022-10-15 NOTE — PLAN OF CARE
Problem: Chronic Conditions and Co-morbidities  Goal: Patient's chronic conditions and co-morbidity symptoms are monitored and maintained or improved  Note: Difficulty swallowing, swollen epigottitis

## 2022-10-15 NOTE — PROGRESS NOTES
OTOLARYNGOLOGY  DAILY PROGRESS NOTE  10/15/2022    Subjective:     Patient is resting comfortably this morning. Says that he is breathing much better and has been sucking on ice chips without difficulty. No acute events overnight. Objective:     BP (!) 164/91   Pulse 98   Temp 98.7 °F (37.1 °C) (Temporal)   Resp 19   Ht 6' 1\" (1.854 m)   Wt 290 lb (131.5 kg)   SpO2 99%   BMI 38.26 kg/m²   PULSE OXIMETRY RANGE: SpO2  Av.4 %  Min: 92 %  Max: 100 %  I/O last 3 completed shifts: In: 528.5 [Blood:335; IV Piggyback:193.5]  Out: -     GENERAL:  Awake, alert, cooperative, no apparent distress  HENT: Normocephalic, atraumatic. Mucous membranes moist. Uvula midline. No pooling of oral secretions. No trismus, 1+ tonsils equal bilaterally, no peritonsillar edema, tongue is midline nonedematous, dentition poor w/ caries, trachea is midline, cricoid is low, obese neck, left submandibular region neck  to touch, nares very dry bilaterally R > L, external ears and Tms intact without effusion  EYES: No sclera icterus, pupils equal  LUNGS:  No increased work of breathing, no stridor  CARDIOVASCULAR:  RR    Endoscopy Procedure Note    Pre-operative Diagnosis: epiglottitis  Post-operative Diagnosis: epiglottic hematoma  Indications: Stridor/Airway obstruction - necessitating endoscopy  Anesthesia: Lidocaine 2% and Eulogio-Synephrine 1/2%  Endoscopy Type:  Flexible nasopharyngolaryngoscopy  Procedure Details   The flexible nasopharyngolaryngoscope was passed through the left side(s) of the nose, and the nose, nasopharynx, oropharynx, hypopharynx and larynx were examined. Examination was performed during quiet respiration and with phonation. The following findings were noted. Findings:  Normal nasopharynx, large area of ecchymoses and edema to the left side of the epiglottis, normal tongue base, retropharyngeal swelling is much improved, Unable to view glottic opening or true vocal cords.  No pooling of secretions. Condition:  Stable  Complications:  None           Assessment/Plan:     48 y.o. male with epiglottic hematoma secondary to supra-therapeutic INR. Remains clinically stable on room air without stridor  Upon scope this morning, while an area of hematoma remains to the left-sided epiglottis, the airway appears much more favorable for intubation if needed. Continue stay in ICU for today  Avoid Lisinopril   Ok to start to wean steroids, recommend two more doses of Decadron 8 mg q8hrs  continue pepcid, +/- benadryl   Consider discontinuing antibiotics as this is likely noninfectious and due to elevated INR/hematoma  Ok to advance to clear liquid diet pending bedside swallow evaluation  Tight BP control   ENT to follow   keep flexible nasopharyngoscope at bedside     Patient seen and discussed with Attending.      Electronically signed by Diana Rose DO on 10/15/2022 at 9:09 AM

## 2022-10-16 LAB
ALBUMIN SERPL-MCNC: 4 G/DL (ref 3.5–5.2)
ALP BLD-CCNC: 116 U/L (ref 40–129)
ALT SERPL-CCNC: 27 U/L (ref 0–40)
ANION GAP SERPL CALCULATED.3IONS-SCNC: 15 MMOL/L (ref 7–16)
AST SERPL-CCNC: 23 U/L (ref 0–39)
BASOPHILS ABSOLUTE: 0 E9/L (ref 0–0.2)
BASOPHILS RELATIVE PERCENT: 0 % (ref 0–2)
BILIRUB SERPL-MCNC: 0.4 MG/DL (ref 0–1.2)
BUN BLDV-MCNC: 25 MG/DL (ref 6–20)
CALCIUM SERPL-MCNC: 9.2 MG/DL (ref 8.6–10.2)
CHLORIDE BLD-SCNC: 100 MMOL/L (ref 98–107)
CO2: 23 MMOL/L (ref 22–29)
CREAT SERPL-MCNC: 0.9 MG/DL (ref 0.7–1.2)
EOSINOPHILS ABSOLUTE: 0 E9/L (ref 0.05–0.5)
EOSINOPHILS RELATIVE PERCENT: 0 % (ref 0–6)
GFR AFRICAN AMERICAN: >60
GFR NON-AFRICAN AMERICAN: >60 ML/MIN/1.73
GLUCOSE BLD-MCNC: 229 MG/DL (ref 74–99)
HCT VFR BLD CALC: 33 % (ref 37–54)
HEMOGLOBIN: 10.9 G/DL (ref 12.5–16.5)
IMMATURE GRANULOCYTES #: 0.09 E9/L
IMMATURE GRANULOCYTES %: 0.6 % (ref 0–5)
INR BLD: 3.5
INR BLD: 7.9
INR BLD: 8.4
LYMPHOCYTES ABSOLUTE: 0.67 E9/L (ref 1.5–4)
LYMPHOCYTES RELATIVE PERCENT: 4.3 % (ref 20–42)
MCH RBC QN AUTO: 29.5 PG (ref 26–35)
MCHC RBC AUTO-ENTMCNC: 33 % (ref 32–34.5)
MCV RBC AUTO: 89.2 FL (ref 80–99.9)
METER GLUCOSE: 180 MG/DL (ref 74–99)
METER GLUCOSE: 187 MG/DL (ref 74–99)
METER GLUCOSE: 222 MG/DL (ref 74–99)
METER GLUCOSE: 298 MG/DL (ref 74–99)
MONOCYTES ABSOLUTE: 0.39 E9/L (ref 0.1–0.95)
MONOCYTES RELATIVE PERCENT: 2.5 % (ref 2–12)
NEUTROPHILS ABSOLUTE: 14.58 E9/L (ref 1.8–7.3)
NEUTROPHILS RELATIVE PERCENT: 92.6 % (ref 43–80)
PDW BLD-RTO: 13.6 FL (ref 11.5–15)
PLATELET # BLD: 587 E9/L (ref 130–450)
PMV BLD AUTO: 10.1 FL (ref 7–12)
POTASSIUM REFLEX MAGNESIUM: 4 MMOL/L (ref 3.5–5)
PROTHROMBIN TIME: 37.6 SEC (ref 9.3–12.4)
PROTHROMBIN TIME: 84.8 SEC (ref 9.3–12.4)
PROTHROMBIN TIME: 90.5 SEC (ref 9.3–12.4)
RBC # BLD: 3.7 E12/L (ref 3.8–5.8)
SODIUM BLD-SCNC: 138 MMOL/L (ref 132–146)
TOTAL PROTEIN: 7.6 G/DL (ref 6.4–8.3)
WBC # BLD: 15.7 E9/L (ref 4.5–11.5)

## 2022-10-16 PROCEDURE — 6370000000 HC RX 637 (ALT 250 FOR IP)

## 2022-10-16 PROCEDURE — 82962 GLUCOSE BLOOD TEST: CPT

## 2022-10-16 PROCEDURE — S5553 INSULIN LONG ACTING 5 U: HCPCS | Performed by: STUDENT IN AN ORGANIZED HEALTH CARE EDUCATION/TRAINING PROGRAM

## 2022-10-16 PROCEDURE — 2500000003 HC RX 250 WO HCPCS: Performed by: STUDENT IN AN ORGANIZED HEALTH CARE EDUCATION/TRAINING PROGRAM

## 2022-10-16 PROCEDURE — 6360000002 HC RX W HCPCS: Performed by: OTOLARYNGOLOGY

## 2022-10-16 PROCEDURE — 1200000000 HC SEMI PRIVATE

## 2022-10-16 PROCEDURE — 85610 PROTHROMBIN TIME: CPT

## 2022-10-16 PROCEDURE — 6370000000 HC RX 637 (ALT 250 FOR IP): Performed by: STUDENT IN AN ORGANIZED HEALTH CARE EDUCATION/TRAINING PROGRAM

## 2022-10-16 PROCEDURE — A4216 STERILE WATER/SALINE, 10 ML: HCPCS | Performed by: STUDENT IN AN ORGANIZED HEALTH CARE EDUCATION/TRAINING PROGRAM

## 2022-10-16 PROCEDURE — 85025 COMPLETE CBC W/AUTO DIFF WBC: CPT

## 2022-10-16 PROCEDURE — 2580000003 HC RX 258

## 2022-10-16 PROCEDURE — 99233 SBSQ HOSP IP/OBS HIGH 50: CPT | Performed by: INTERNAL MEDICINE

## 2022-10-16 PROCEDURE — 80053 COMPREHEN METABOLIC PANEL: CPT

## 2022-10-16 PROCEDURE — 2580000003 HC RX 258: Performed by: STUDENT IN AN ORGANIZED HEALTH CARE EDUCATION/TRAINING PROGRAM

## 2022-10-16 PROCEDURE — 36415 COLL VENOUS BLD VENIPUNCTURE: CPT

## 2022-10-16 PROCEDURE — 6370000000 HC RX 637 (ALT 250 FOR IP): Performed by: INTERNAL MEDICINE

## 2022-10-16 RX ORDER — INSULIN GLARGINE-YFGN 100 [IU]/ML
7 INJECTION, SOLUTION SUBCUTANEOUS NIGHTLY
Status: DISCONTINUED | OUTPATIENT
Start: 2022-10-16 | End: 2022-10-21 | Stop reason: HOSPADM

## 2022-10-16 RX ORDER — INSULIN LISPRO 100 [IU]/ML
0-4 INJECTION, SOLUTION INTRAVENOUS; SUBCUTANEOUS
Status: DISCONTINUED | OUTPATIENT
Start: 2022-10-16 | End: 2022-10-21 | Stop reason: HOSPADM

## 2022-10-16 RX ORDER — PHYTONADIONE 5 MG/1
5 TABLET ORAL ONCE
Status: COMPLETED | OUTPATIENT
Start: 2022-10-16 | End: 2022-10-16

## 2022-10-16 RX ORDER — INSULIN LISPRO 100 [IU]/ML
0-4 INJECTION, SOLUTION INTRAVENOUS; SUBCUTANEOUS NIGHTLY
Status: DISCONTINUED | OUTPATIENT
Start: 2022-10-16 | End: 2022-10-21 | Stop reason: HOSPADM

## 2022-10-16 RX ADMIN — DEXAMETHASONE SODIUM PHOSPHATE 8 MG: 10 INJECTION, SOLUTION INTRAMUSCULAR; INTRAVENOUS at 05:00

## 2022-10-16 RX ADMIN — INSULIN GLARGINE-YFGN 7 UNITS: 100 INJECTION, SOLUTION SUBCUTANEOUS at 20:48

## 2022-10-16 RX ADMIN — INSULIN LISPRO 2 UNITS: 100 INJECTION, SOLUTION INTRAVENOUS; SUBCUTANEOUS at 11:45

## 2022-10-16 RX ADMIN — LABETALOL HYDROCHLORIDE 10 MG: 5 INJECTION INTRAVENOUS at 01:32

## 2022-10-16 RX ADMIN — LABETALOL HYDROCHLORIDE 10 MG: 5 INJECTION INTRAVENOUS at 05:01

## 2022-10-16 RX ADMIN — SODIUM CHLORIDE, PRESERVATIVE FREE 10 ML: 5 INJECTION INTRAVENOUS at 20:48

## 2022-10-16 RX ADMIN — INSULIN LISPRO 2 UNITS: 100 INJECTION, SOLUTION INTRAVENOUS; SUBCUTANEOUS at 08:31

## 2022-10-16 RX ADMIN — FAMOTIDINE 20 MG: 10 INJECTION INTRAVENOUS at 01:33

## 2022-10-16 RX ADMIN — PHYTONADIONE 5 MG: 5 TABLET ORAL at 11:45

## 2022-10-16 RX ADMIN — ACETAMINOPHEN 650 MG: 325 TABLET, FILM COATED ORAL at 20:48

## 2022-10-16 RX ADMIN — FAMOTIDINE 20 MG: 10 INJECTION INTRAVENOUS at 08:32

## 2022-10-16 ASSESSMENT — PAIN SCALES - GENERAL
PAINLEVEL_OUTOF10: 0
PAINLEVEL_OUTOF10: 6
PAINLEVEL_OUTOF10: 0
PAINLEVEL_OUTOF10: 0
PAINLEVEL_OUTOF10: 7
PAINLEVEL_OUTOF10: 6
PAINLEVEL_OUTOF10: 0
PAINLEVEL_OUTOF10: 0

## 2022-10-16 ASSESSMENT — PAIN DESCRIPTION - DESCRIPTORS: DESCRIPTORS: ACHING;DISCOMFORT

## 2022-10-16 ASSESSMENT — PAIN DESCRIPTION - FREQUENCY: FREQUENCY: CONTINUOUS

## 2022-10-16 ASSESSMENT — PAIN DESCRIPTION - ORIENTATION: ORIENTATION: LEFT

## 2022-10-16 ASSESSMENT — PAIN DESCRIPTION - PAIN TYPE: TYPE: ACUTE PAIN

## 2022-10-16 ASSESSMENT — PAIN DESCRIPTION - LOCATION: LOCATION: JAW

## 2022-10-16 NOTE — PLAN OF CARE
Problem: Chronic Conditions and Co-morbidities  Goal: Patient's chronic conditions and co-morbidity symptoms are monitored and maintained or improved  Outcome: Progressing  Flowsheets (Taken 10/15/2022 2000)  Care Plan - Patient's Chronic Conditions and Co-Morbidity Symptoms are Monitored and Maintained or Improved:   Monitor and assess patient's chronic conditions and comorbid symptoms for stability, deterioration, or improvement   Collaborate with multidisciplinary team to address chronic and comorbid conditions and prevent exacerbation or deterioration     Problem: Discharge Planning  Goal: Discharge to home or other facility with appropriate resources  Outcome: Progressing  Flowsheets (Taken 10/15/2022 2000)  Discharge to home or other facility with appropriate resources: Identify barriers to discharge with patient and caregiver     Problem: Skin/Tissue Integrity  Goal: Absence of new skin breakdown  Description: 1. Monitor for areas of redness and/or skin breakdown  2. Assess vascular access sites hourly  3. Every 4-6 hours minimum:  Change oxygen saturation probe site  4. Every 4-6 hours:  If on nasal continuous positive airway pressure, respiratory therapy assess nares and determine need for appliance change or resting period.   Outcome: Progressing     Problem: Safety - Adult  Goal: Free from fall injury  Outcome: Progressing  Flowsheets (Taken 10/15/2022 2100)  Free From Fall Injury: Instruct family/caregiver on patient safety     Problem: Pain  Goal: Verbalizes/displays adequate comfort level or baseline comfort level  Outcome: Progressing

## 2022-10-16 NOTE — PROGRESS NOTES
[address] [facility]   Department of Internal Medicine   Internal Medicine Residency  MICU Progress Note    Patient:  Dre Britton 48 y.o. male   MRN: [id number]       Date of Service: 10/15/2022    Allergy: Seasonal and Tramadol    Subjective     Overnight, no acute events. INR improved to 5.6. Patient was seen and examined this morning at bedside in no acute distress. He still has pain on swallowing and talking but he was able to tolerate ice chips. ENT service did a laryngoscopy which showed decreased secreations and decreased swelling of the epiglottis. Objective     TEMPERATURE:  Current - Temp: 98 °F (36.7 °C); Max - Temp  Av.5 °F (36.9 °C)  Min: 98 °F (36.7 °C)  Max: 98.8 °F (37.1 °C)  RESPIRATIONS RANGE: Resp  Av.1  Min: 12  Max: 27  PULSE RANGE: Pulse  Av.5  Min: 77  Max: 112  BLOOD PRESSURE RANGE:  Systolic (78MBX), IWB:141 , Min:100 , ZNH:355   ; Diastolic (25HUV), VTT:80, Min:55, Max:110    PULSE OXIMETRY RANGE: SpO2  Av.9 %  Min: 92 %  Max: 100 %    I & O - 24hr:    Intake/Output Summary (Last 24 hours) at 10/15/2022 2248  Last data filed at 10/15/2022 2200  Gross per 24 hour   Intake 193.5 ml   Output 300 ml   Net -106.5 ml     I/O last 3 completed shifts: In: 528.5 [Blood:335; IV Piggyback:193.5]  Out: -  I/O this shift:  In: -   Out: 300 [Urine:300]   Weight change:     Physical Exam:  General Appearance:    Alert, cooperative, no acute distress. HEENT:    NC/AT, mucous membranes are moist   Neck:   Supple, no jugular venous distention, +tenderness of left side of neck    Resp:     CTAB, No wheezes, No rhonchi, no use of accessory muscles   Heart:    RRR, S1 and S2 normal, no murmur, rub or gallop.     Abdomen:     Soft, non-tender, non-distended with normal bowel sounds   Extremities:   Atraumatic, no cyanosis or edema   Pulses:  Radial and pedal pulses are intact bilaterally   Neurologic:   AAOx3, No focal motor deficit       Medications     Continuous Infusions:   sodium chloride      sodium chloride      dextrose       Scheduled Meds:   insulin lispro  0-8 Units SubCUTAneous TID WC    insulin lispro  0-4 Units SubCUTAneous Nightly    insulin glargine  5 Units SubCUTAneous Nightly    sodium chloride flush  5-40 mL IntraVENous 2 times per day    [Held by provider] enoxaparin  30 mg SubCUTAneous BID    dexamethasone  8 mg IntraVENous Q8H     PRN Meds: sodium chloride, sodium chloride flush, sodium chloride, ondansetron **OR** ondansetron, polyethylene glycol, acetaminophen **OR** acetaminophen, glucose, dextrose bolus **OR** dextrose bolus, glucagon (rDNA), dextrose  Nutrition:   NG/OG tube TF type: Pulmocare/Nephro/Glucerna/Jevity        At rate: n/a    Labs and Imaging Studies     CBC:   Recent Labs     10/14/22  0716 10/15/22  0510   WBC 12.9* 9.0   HGB 11.6* 12.0*   HCT 35.3* 36.3*   MCV 90.5 90.5   * 645*       BMP:    Recent Labs     10/14/22  0716 10/15/22  0510 10/15/22  1027    134 135   K 4.7 5.4* 4.4    97* 97*   CO2 28 25 25   BUN 11 20 23*   CREATININE 0.9 0.9 1.0   GLUCOSE 111* 185* 232*       LIVER PROFILE:   Recent Labs     10/15/22  0510   AST 43*   ALT 37   BILITOT 0.6   ALKPHOS 131*       PT/INR:   Recent Labs     10/14/22  0716 10/14/22  1317 10/15/22  0510   PROTIME 105.3* 105.9* 60.1*   INR 9.9* 9.9* 5.6*       APTT:   No results for input(s): APTT in the last 72 hours.     Fasting Lipid Panel:    Lab Results   Component Value Date/Time    CHOL 197 08/19/2022 12:00 PM    TRIG 202 08/19/2022 12:00 PM    HDL 41 08/19/2022 12:00 PM       Cardiac Enzymes:    Lab Results   Component Value Date    CKTOTAL 638 (H) 10/08/2021    CKTOTAL 603 (H) 10/08/2021    CKTOTAL 397 (H) 09/28/2021    CKMB 1.4 11/26/2013    TROPONINI <0.01 04/23/2021    TROPONINI <0.01 12/30/2020    TROPONINI <0.01 11/18/2020       Notable Cultures:      Blood cultures   Blood Culture, Routine   Date Value Ref Range Status   10/25/2021 5 Days no growth  Final Respiratory cultures No results found for: RESPCULTURE   Gram Stain Result   Date Value Ref Range Status   10/08/2021 Refer to ordered Gram stain for results  Final     Urine   Creatinine Ur POCT   Date Value Ref Range Status   02/14/2022 200  Final     Legionella No results found for: LABLEGI  C Diff PCR No results found for: CDIFPCR  Wound culture/abscess: No results for input(s): WNDABS in the last 72 hours. Tip culture:No results for input(s): CXCATHTIP in the last 72 hours. Oxygen: Additional Respiratory Assessments  Heart Rate: (!) 107  Resp: 13  SpO2: 98 %  Swallow: Normal - able to swallow solids            Imaging Studies:  CT SOFT TISSUE NECK W CONTRAST   Final Result   Abnormal retropharyngeal fluid collection consistent with a phlegmon   extending from the skull base to the C7 level measuring 1 cm in thickness. Edematous appearance of the epiglottis concerning for acute epiglottitis. There is mild-to-moderate supraglottic airway narrowing. Diffusely edematous appearance of the oropharynx and hypopharynx consistent   with a severe pharyngitis. No drainable abscess identified.               Resident's Assessment and Plan     Assessment:  Acute epiglottitis s/p ENT scope in ED  Leukocytosis, resolved  Supra therapeutic INR, (9.9) in the setting of chronic warfarin, improving  S/p TXA, FFP x 2  Normocytic anemia, Hg 11.9  Thrombocytosis, Hx of Hydrea  Hx recurrent DVT's, on chronic coumadin   IDDM, on home Lantus 25 units   HTN, on home Toprol XL 25 OD  DM neuropathy, on home Gabapentin 300 TID  Hx c-spine fusion surgery, 2000    Plan:  Follow ENT recs: continue decadron 2 mg Q8H x 2 doses then wean, avoid lisinopril, continue pepcid and benadryl prn, ok for clear liquid diet  Monitor INR Q8H  Continue holding anticoagulation  PRN meds for blood pressure control, recommend discontinuing lisinopril and starting amlodipine for maintenance  Continue lantus 5 u and MDSS, BG checks ac/hs  Daily CBC and CMP  Continue monitoring patient in MICU overnight for possible airway compromise      # Peptic ulcer prophylaxis:not indicated   # DVT Prophylaxis: on hold  # Disposition: Cont current care    Espnioza Bolton MD, PGY-2    Attending Physician: Dr. Anneliese Zheng    I personally saw, examined and provided care for the patient. Radiographs, labs and medication list were reviewed by me independently. Review of Residents documentation was conducted and revisions were made as appropriate. I agree with the above documented exam, problem list and plan of care.         CCT excluding procedures 35 minutes    Emily Hull,

## 2022-10-16 NOTE — PROGRESS NOTES
[address] [facility]   Department of Internal Medicine   Internal Medicine Residency  MICU Progress Note    Patient:  Dot Men 48 y.o. male   MRN: [id number]       Date of Service: 10/16/2022    Allergy: Seasonal and Tramadol    Subjective     Overnight, no acute events. INR worsened to 8.4 today    Patient was seen and examined this morning at bedside in no acute distress. Patient reports decreased pain on swallowing. Patient has been tolerating liquid diet well. No active bleed on exam.  Objective     TEMPERATURE:  Current - Temp: 98.1 °F (36.7 °C); Max - Temp  Av.2 °F (36.8 °C)  Min: 98 °F (36.7 °C)  Max: 98.6 °F (37 °C)  RESPIRATIONS RANGE: Resp  Av.2  Min: 11  Max: 30  PULSE RANGE: Pulse  Av.3  Min: 93  Max: 111  BLOOD PRESSURE RANGE:  Systolic (25NWG), CZT:073 , Min:100 , JR   ; Diastolic (64XLN), IOR:84, Min:60, Max:95    PULSE OXIMETRY RANGE: SpO2  Av.1 %  Min: 93 %  Max: 100 %    I & O - 24hr:    Intake/Output Summary (Last 24 hours) at 10/16/2022 1019  Last data filed at 10/15/2022 2200  Gross per 24 hour   Intake --   Output 300 ml   Net -300 ml       I/O last 3 completed shifts: In: 193.5 [IV Piggyback:193.5]  Out: 300 [Urine:300] No intake/output data recorded. Weight change:     Physical Exam:  General Appearance:    Alert, cooperative, no acute distress. HEENT:    NC/AT, mucous membranes are moist   Neck:   Supple, no jugular venous distention, +tenderness of left side of neck    Resp:     CTAB, No wheezes, No rhonchi, no use of accessory muscles   Heart:    RRR, S1 and S2 normal, no murmur, rub or gallop.     Abdomen:     Soft, non-tender, non-distended with normal bowel sounds   Extremities:   Atraumatic, no cyanosis or edema   Pulses:  Radial and pedal pulses are intact bilaterally   Neurologic:   AAOx3, No focal motor deficit       Medications     Continuous Infusions:   sodium chloride      sodium chloride      dextrose       Scheduled Meds:   insulin glargine  7 Units SubCUTAneous Nightly    insulin lispro  0-4 Units SubCUTAneous TID WC    insulin lispro  0-4 Units SubCUTAneous Nightly    famotidine (PEPCID) injection  20 mg IntraVENous Daily    labetalol  10 mg IntraVENous Q6H    sodium chloride flush  5-40 mL IntraVENous 2 times per day    [Held by provider] enoxaparin  30 mg SubCUTAneous BID     PRN Meds: diphenhydrAMINE, hydrALAZINE, sodium chloride, sodium chloride flush, sodium chloride, ondansetron **OR** ondansetron, polyethylene glycol, acetaminophen **OR** acetaminophen, glucose, dextrose bolus **OR** dextrose bolus, glucagon (rDNA), dextrose  Nutrition:   NG/OG tube TF type: Pulmocare/Nephro/Glucerna/Jevity        At rate: n/a    Labs and Imaging Studies     CBC:   Recent Labs     10/14/22  0716 10/15/22  0510 10/16/22  0450   WBC 12.9* 9.0 15.7*   HGB 11.6* 12.0* 10.9*   HCT 35.3* 36.3* 33.0*   MCV 90.5 90.5 89.2   * 645* 587*         BMP:    Recent Labs     10/15/22  0510 10/15/22  1027 10/16/22  0404    135 138   K 5.4* 4.4 4.0   CL 97* 97* 100   CO2 25 25 23   BUN 20 23* 25*   CREATININE 0.9 1.0 0.9   GLUCOSE 185* 232* 229*         LIVER PROFILE:   Recent Labs     10/15/22  0510 10/16/22  0404   AST 43* 23   ALT 37 27   BILITOT 0.6 0.4   ALKPHOS 131* 116         PT/INR:   Recent Labs     10/14/22  1317 10/15/22  0510 10/16/22  0813   PROTIME 105.9* 60.1* 90.5*   INR 9.9* 5.6* 8.4*         APTT:   No results for input(s): APTT in the last 72 hours.     Fasting Lipid Panel:    Lab Results   Component Value Date/Time    CHOL 197 08/19/2022 12:00 PM    TRIG 202 08/19/2022 12:00 PM    HDL 41 08/19/2022 12:00 PM       Cardiac Enzymes:    Lab Results   Component Value Date    CKTOTAL 638 (H) 10/08/2021    CKTOTAL 603 (H) 10/08/2021    CKTOTAL 397 (H) 09/28/2021    CKMB 1.4 11/26/2013    TROPONINI <0.01 04/23/2021    TROPONINI <0.01 12/30/2020    TROPONINI <0.01 11/18/2020       Notable Cultures:      Blood cultures   Blood Culture, Routine Date Value Ref Range Status   10/14/2022 24 Hours no growth  Preliminary     Respiratory cultures No results found for: RESPCULTURE   Gram Stain Result   Date Value Ref Range Status   10/08/2021 Refer to ordered Gram stain for results  Final     Urine   Creatinine Ur POCT   Date Value Ref Range Status   02/14/2022 200  Final     Legionella No results found for: LABLEGI  C Diff PCR No results found for: CDIFPCR  Wound culture/abscess: No results for input(s): WNDABS in the last 72 hours. Tip culture:No results for input(s): CXCATHTIP in the last 72 hours. Oxygen: Additional Respiratory Assessments  Heart Rate: 100  Resp: 24  SpO2: 99 %  Swallow: Normal - able to swallow solids            Imaging Studies:  CT SOFT TISSUE NECK W CONTRAST   Final Result   Abnormal retropharyngeal fluid collection consistent with a phlegmon   extending from the skull base to the C7 level measuring 1 cm in thickness. Edematous appearance of the epiglottis concerning for acute epiglottitis. There is mild-to-moderate supraglottic airway narrowing. Diffusely edematous appearance of the oropharynx and hypopharynx consistent   with a severe pharyngitis. No drainable abscess identified. Resident's Assessment and Plan     Assessment:  Epiglottic hematoma 2/2 supratherapeutic INR s/p ENT scope   Leukocytosis likely 2/2 steroids, trending up to 15.7  Supra therapeutic INR, (9.9) in the setting of chronic warfarin  S/p TXA, FFP x 2  Trending up to 8.4 this a.m. Normocytic anemia, Hg 10.9  Thrombocytosis, Hx of Hydrea  Hx recurrent DVT's, on chronic coumadin, currently held  IDDM,  Lantus 25 units at home  HTN, on home Toprol XL 25 OD  DM neuropathy, on home Gabapentin 300 TID  Hx c-spine fusion surgery, 2000    Plan:  ENT following, stop Decadron   Continue to avoid lisinopril   Continue Pepcid and Benadryl as needed   Patient tolerating clear liquid diet, advance diet per ENT recommendations.    INR up trended to 8.4 this morning, tablet vitamin K 5 mg once, monitor for signs of bleeding and monitor INR every 12 hourly  Continue to hold warfarin  Monitor blood pressure, labetalol and hydralazine as needed   Blood glucose above goal, increase Lantus to 7 units daily and low-dose sliding scale. Monitor blood glucose before meals and at bedtime  Transfer out of ICU      # Peptic ulcer prophylaxis:not indicated   # DVT Prophylaxis: on hold  # Disposition: Transfer to general floor    Omaira Orr MD, PGY-2    Attending Physician: Dr. Ermelinda Bob    I personally saw, examined and provided care for the patient. Radiographs, labs and medication list were reviewed by me independently. Review of Residents documentation was conducted and revisions were made as appropriate. I agree with the above documented exam, problem list and plan of care.         CCT excluding procedures 32 minutes    Wilhemrobin Mai DO

## 2022-10-16 NOTE — PLAN OF CARE
Problem: Chronic Conditions and Co-morbidities  Goal: Patient's chronic conditions and co-morbidity symptoms are monitored and maintained or improved  Outcome: Progressing  Flowsheets  Taken 10/16/2022 1600 by Ursula Winters RN  Care Plan - Patient's Chronic Conditions and Co-Morbidity Symptoms are Monitored and Maintained or Improved:   Monitor and assess patient's chronic conditions and comorbid symptoms for stability, deterioration, or improvement   Collaborate with multidisciplinary team to address chronic and comorbid conditions and prevent exacerbation or deterioration  Taken 10/16/2022 1400 by Ursula Winters RN  Care Plan - Patient's Chronic Conditions and Co-Morbidity Symptoms are Monitored and Maintained or Improved:   Monitor and assess patient's chronic conditions and comorbid symptoms for stability, deterioration, or improvement   Collaborate with multidisciplinary team to address chronic and comorbid conditions and prevent exacerbation or deterioration  Taken 10/16/2022 1200 by Ursula Winters RN  Care Plan - Patient's Chronic Conditions and Co-Morbidity Symptoms are Monitored and Maintained or Improved:   Monitor and assess patient's chronic conditions and comorbid symptoms for stability, deterioration, or improvement   Collaborate with multidisciplinary team to address chronic and comorbid conditions and prevent exacerbation or deterioration  Taken 10/16/2022 1000 by Ursula Winters RN  Care Plan - Patient's Chronic Conditions and Co-Morbidity Symptoms are Monitored and Maintained or Improved:   Monitor and assess patient's chronic conditions and comorbid symptoms for stability, deterioration, or improvement   Collaborate with multidisciplinary team to address chronic and comorbid conditions and prevent exacerbation or deterioration  Taken 10/16/2022 0800 by Ursula Winters RN  Care Plan - Patient's Chronic Conditions and Co-Morbidity Symptoms are Monitored and Maintained or Improved: Monitor and assess patient's chronic conditions and comorbid symptoms for stability, deterioration, or improvement   Collaborate with multidisciplinary team to address chronic and comorbid conditions and prevent exacerbation or deterioration     Problem: Discharge Planning  Goal: Discharge to home or other facility with appropriate resources  Outcome: Progressing  Flowsheets  Taken 10/16/2022 1600 by George Erickson RN  Discharge to home or other facility with appropriate resources: Identify barriers to discharge with patient and caregiver  Taken 10/16/2022 1400 by George Erickson RN  Discharge to home or other facility with appropriate resources: Identify barriers to discharge with patient and caregiver  Taken 10/16/2022 1200 by George Erickson RN  Discharge to home or other facility with appropriate resources: Identify barriers to discharge with patient and caregiver  Taken 10/16/2022 1000 by George Erickson RN  Discharge to home or other facility with appropriate resources: Identify barriers to discharge with patient and caregiver  Taken 10/16/2022 0800 by George Erickson RN  Discharge to home or other facility with appropriate resources: Identify barriers to discharge with patient and caregiver     Problem: Skin/Tissue Integrity  Goal: Absence of new skin breakdown  Description: 1. Monitor for areas of redness and/or skin breakdown  2. Assess vascular access sites hourly  3. Every 4-6 hours minimum:  Change oxygen saturation probe site  4. Every 4-6 hours:  If on nasal continuous positive airway pressure, respiratory therapy assess nares and determine need for appliance change or resting period.   Outcome: Progressing     Problem: Safety - Adult  Goal: Free from fall injury  Outcome: Progressing     Problem: Pain  Goal: Verbalizes/displays adequate comfort level or baseline comfort level  Outcome: Progressing

## 2022-10-16 NOTE — PROGRESS NOTES
OTOLARYNGOLOGY  DAILY PROGRESS NOTE  10/16/2022    Subjective:     Patient is resting comfortably this morning. He feels that his voice is back to normal and he is tolerating his liquid diet without difficulty. No acute events overnight. Objective:     BP (!) 151/98   Pulse 98   Temp 98.1 °F (36.7 °C) (Axillary)   Resp 15   Ht 6' 1\" (1.854 m)   Wt 290 lb (131.5 kg)   SpO2 99%   BMI 38.26 kg/m²   PULSE OXIMETRY RANGE: SpO2  Av.3 %  Min: 94 %  Max: 100 %  I/O last 3 completed shifts: In: 193.5 [IV Piggyback:193.5]  Out: 300 [Urine:300]    GENERAL:  Awake, alert, cooperative, no apparent distress  HENT: Normocephalic, atraumatic. Mucous membranes moist. Uvula midline. No pooling of oral secretions. No trismus, 1+ tonsils equal bilaterally, no peritonsillar edema, tongue is midline nonedematous, dentition poor w/ caries, trachea is midline, cricoid is low, obese neck, left submandibular region neck without tenderness or swelling, nares very dry bilaterally R > L, external ears and Tms intact without effusion  EYES: No sclera icterus, pupils equal  LUNGS:  No increased work of breathing, no stridor  CARDIOVASCULAR:  RR    Endoscopy Procedure Note    Pre-operative Diagnosis: epiglottitis  Post-operative Diagnosis: epiglottic hematoma  Indications: Stridor/Airway obstruction - necessitating endoscopy  Anesthesia: Lidocaine 2% and Eulogio-Synephrine 1/2%  Endoscopy Type:  Flexible nasopharyngolaryngoscopy  Procedure Details   The flexible nasopharyngolaryngoscope was passed through the left side(s) of the nose, and the nose, nasopharynx, oropharynx, hypopharynx and larynx were examined. Examination was performed during quiet respiration and with phonation. The following findings were noted. Findings:  Normal nasopharynx, large area of ecchymoses and edema to the left side of the epiglottis, normal tongue base, retropharyngeal swelling is much improved. Ecchymoses present in the valleculae.  True vocal cords visualized with full motion. Condition:  Stable  Complications:  None               Assessment/Plan:     48 y.o. male with epiglottic hematoma secondary to supra-therapeutic INR. Remains clinically stable on room air without stridor  Upon scope this morning, hematoma has significantly decreased in size and TVCs are visible  Airway is stable at this time  [id number] [address] for transfer out of ICU  Avoid Lisinopril   Continue Pepcid  Consider discontinuing antibiotics as this is likely noninfectious and due to elevated INR/hematoma  Ok to advance to soft diet   Tight BP control and continue to manage INR by medicine team   keep flexible nasopharyngoscope at bedside     Patient seen and discussed with Attending.      Electronically signed by Cristina Baltazar DO on 10/16/2022 at 12:53 PM

## 2022-10-17 LAB
ANION GAP SERPL CALCULATED.3IONS-SCNC: 13 MMOL/L (ref 7–16)
BASOPHILS ABSOLUTE: 0.01 E9/L (ref 0–0.2)
BASOPHILS ABSOLUTE: 0.02 E9/L (ref 0–0.2)
BASOPHILS RELATIVE PERCENT: 0.1 % (ref 0–2)
BASOPHILS RELATIVE PERCENT: 0.1 % (ref 0–2)
BUN BLDV-MCNC: 24 MG/DL (ref 6–20)
CALCIUM SERPL-MCNC: 9.5 MG/DL (ref 8.6–10.2)
CHLORIDE BLD-SCNC: 103 MMOL/L (ref 98–107)
CO2: 24 MMOL/L (ref 22–29)
CREAT SERPL-MCNC: 0.9 MG/DL (ref 0.7–1.2)
EKG ATRIAL RATE: 85 BPM
EKG P AXIS: 74 DEGREES
EKG P-R INTERVAL: 166 MS
EKG Q-T INTERVAL: 378 MS
EKG QRS DURATION: 96 MS
EKG QTC CALCULATION (BAZETT): 449 MS
EKG R AXIS: 63 DEGREES
EKG T AXIS: 59 DEGREES
EKG VENTRICULAR RATE: 85 BPM
EOSINOPHILS ABSOLUTE: 0 E9/L (ref 0.05–0.5)
EOSINOPHILS ABSOLUTE: 0.01 E9/L (ref 0.05–0.5)
EOSINOPHILS RELATIVE PERCENT: 0 % (ref 0–6)
EOSINOPHILS RELATIVE PERCENT: 0.1 % (ref 0–6)
GFR AFRICAN AMERICAN: >60
GFR NON-AFRICAN AMERICAN: >60 ML/MIN/1.73
GLUCOSE BLD-MCNC: 131 MG/DL (ref 74–99)
HCT VFR BLD CALC: 35.5 % (ref 37–54)
HCT VFR BLD CALC: 37.7 % (ref 37–54)
HEMOGLOBIN: 11.3 G/DL (ref 12.5–16.5)
HEMOGLOBIN: 11.7 G/DL (ref 12.5–16.5)
IMMATURE GRANULOCYTES #: 0.05 E9/L
IMMATURE GRANULOCYTES #: 0.11 E9/L
IMMATURE GRANULOCYTES %: 0.3 % (ref 0–5)
IMMATURE GRANULOCYTES %: 0.7 % (ref 0–5)
INR BLD: 1.8
LYMPHOCYTES ABSOLUTE: 2.41 E9/L (ref 1.5–4)
LYMPHOCYTES ABSOLUTE: 3.61 E9/L (ref 1.5–4)
LYMPHOCYTES RELATIVE PERCENT: 16.9 % (ref 20–42)
LYMPHOCYTES RELATIVE PERCENT: 24.3 % (ref 20–42)
MCH RBC QN AUTO: 28.7 PG (ref 26–35)
MCH RBC QN AUTO: 29 PG (ref 26–35)
MCHC RBC AUTO-ENTMCNC: 31 % (ref 32–34.5)
MCHC RBC AUTO-ENTMCNC: 31.8 % (ref 32–34.5)
MCV RBC AUTO: 91.3 FL (ref 80–99.9)
MCV RBC AUTO: 92.4 FL (ref 80–99.9)
METER GLUCOSE: 115 MG/DL (ref 74–99)
METER GLUCOSE: 123 MG/DL (ref 74–99)
METER GLUCOSE: 132 MG/DL (ref 74–99)
METER GLUCOSE: 141 MG/DL (ref 74–99)
MONOCYTES ABSOLUTE: 1.37 E9/L (ref 0.1–0.95)
MONOCYTES ABSOLUTE: 1.48 E9/L (ref 0.1–0.95)
MONOCYTES RELATIVE PERCENT: 10.4 % (ref 2–12)
MONOCYTES RELATIVE PERCENT: 9.2 % (ref 2–12)
NEUTROPHILS ABSOLUTE: 10.34 E9/L (ref 1.8–7.3)
NEUTROPHILS ABSOLUTE: 9.71 E9/L (ref 1.8–7.3)
NEUTROPHILS RELATIVE PERCENT: 65.6 % (ref 43–80)
NEUTROPHILS RELATIVE PERCENT: 72.3 % (ref 43–80)
PDW BLD-RTO: 13.5 FL (ref 11.5–15)
PDW BLD-RTO: 13.7 FL (ref 11.5–15)
PLATELET # BLD: 687 E9/L (ref 130–450)
PLATELET # BLD: 724 E9/L (ref 130–450)
PMV BLD AUTO: 9.4 FL (ref 7–12)
PMV BLD AUTO: 9.4 FL (ref 7–12)
POTASSIUM SERPL-SCNC: 3.7 MMOL/L (ref 3.5–5)
PROTHROMBIN TIME: 19.9 SEC (ref 9.3–12.4)
RBC # BLD: 3.89 E12/L (ref 3.8–5.8)
RBC # BLD: 4.08 E12/L (ref 3.8–5.8)
SODIUM BLD-SCNC: 140 MMOL/L (ref 132–146)
WBC # BLD: 14.3 E9/L (ref 4.5–11.5)
WBC # BLD: 14.8 E9/L (ref 4.5–11.5)

## 2022-10-17 PROCEDURE — 1200000000 HC SEMI PRIVATE

## 2022-10-17 PROCEDURE — 6370000000 HC RX 637 (ALT 250 FOR IP): Performed by: STUDENT IN AN ORGANIZED HEALTH CARE EDUCATION/TRAINING PROGRAM

## 2022-10-17 PROCEDURE — 2580000003 HC RX 258: Performed by: STUDENT IN AN ORGANIZED HEALTH CARE EDUCATION/TRAINING PROGRAM

## 2022-10-17 PROCEDURE — 6360000002 HC RX W HCPCS: Performed by: STUDENT IN AN ORGANIZED HEALTH CARE EDUCATION/TRAINING PROGRAM

## 2022-10-17 PROCEDURE — S5553 INSULIN LONG ACTING 5 U: HCPCS | Performed by: STUDENT IN AN ORGANIZED HEALTH CARE EDUCATION/TRAINING PROGRAM

## 2022-10-17 PROCEDURE — 6360000002 HC RX W HCPCS: Performed by: CHIROPRACTOR

## 2022-10-17 PROCEDURE — A4216 STERILE WATER/SALINE, 10 ML: HCPCS | Performed by: STUDENT IN AN ORGANIZED HEALTH CARE EDUCATION/TRAINING PROGRAM

## 2022-10-17 PROCEDURE — 6370000000 HC RX 637 (ALT 250 FOR IP): Performed by: INTERNAL MEDICINE

## 2022-10-17 PROCEDURE — 99231 SBSQ HOSP IP/OBS SF/LOW 25: CPT | Performed by: INTERNAL MEDICINE

## 2022-10-17 PROCEDURE — 85025 COMPLETE CBC W/AUTO DIFF WBC: CPT

## 2022-10-17 PROCEDURE — 85610 PROTHROMBIN TIME: CPT

## 2022-10-17 PROCEDURE — 36415 COLL VENOUS BLD VENIPUNCTURE: CPT

## 2022-10-17 PROCEDURE — 2500000003 HC RX 250 WO HCPCS: Performed by: STUDENT IN AN ORGANIZED HEALTH CARE EDUCATION/TRAINING PROGRAM

## 2022-10-17 PROCEDURE — 80048 BASIC METABOLIC PNL TOTAL CA: CPT

## 2022-10-17 PROCEDURE — 6370000000 HC RX 637 (ALT 250 FOR IP): Performed by: CHIROPRACTOR

## 2022-10-17 PROCEDURE — 82962 GLUCOSE BLOOD TEST: CPT

## 2022-10-17 RX ORDER — FAMOTIDINE 20 MG/1
20 TABLET, FILM COATED ORAL 2 TIMES DAILY
Status: DISCONTINUED | OUTPATIENT
Start: 2022-10-17 | End: 2022-10-21 | Stop reason: HOSPADM

## 2022-10-17 RX ORDER — HYDROXYUREA 500 MG/1
500 CAPSULE ORAL 2 TIMES DAILY
Status: DISCONTINUED | OUTPATIENT
Start: 2022-10-17 | End: 2022-10-21 | Stop reason: HOSPADM

## 2022-10-17 RX ORDER — WARFARIN SODIUM 5 MG/1
5 TABLET ORAL DAILY
Status: DISCONTINUED | OUTPATIENT
Start: 2022-10-17 | End: 2022-10-19

## 2022-10-17 RX ORDER — KETOROLAC TROMETHAMINE 30 MG/ML
15 INJECTION, SOLUTION INTRAMUSCULAR; INTRAVENOUS ONCE
Status: COMPLETED | OUTPATIENT
Start: 2022-10-17 | End: 2022-10-17

## 2022-10-17 RX ORDER — METOPROLOL SUCCINATE 25 MG/1
25 TABLET, EXTENDED RELEASE ORAL DAILY
Status: DISCONTINUED | OUTPATIENT
Start: 2022-10-17 | End: 2022-10-20

## 2022-10-17 RX ORDER — GABAPENTIN 100 MG/1
200 CAPSULE ORAL NIGHTLY
Status: DISCONTINUED | OUTPATIENT
Start: 2022-10-17 | End: 2022-10-21 | Stop reason: HOSPADM

## 2022-10-17 RX ORDER — OXYCODONE HYDROCHLORIDE 10 MG/1
10 TABLET ORAL ONCE
Status: COMPLETED | OUTPATIENT
Start: 2022-10-18 | End: 2022-10-18

## 2022-10-17 RX ORDER — WARFARIN SODIUM 5 MG/1
5 TABLET ORAL DAILY
Status: DISCONTINUED | OUTPATIENT
Start: 2022-10-17 | End: 2022-10-17

## 2022-10-17 RX ORDER — METOPROLOL TARTRATE 50 MG/1
50 TABLET, FILM COATED ORAL 2 TIMES DAILY
Status: DISCONTINUED | OUTPATIENT
Start: 2022-10-17 | End: 2022-10-17

## 2022-10-17 RX ORDER — LISINOPRIL 5 MG/1
5 TABLET ORAL DAILY
Status: DISCONTINUED | OUTPATIENT
Start: 2022-10-17 | End: 2022-10-21 | Stop reason: HOSPADM

## 2022-10-17 RX ADMIN — HYDRALAZINE HYDROCHLORIDE 10 MG: 20 INJECTION INTRAMUSCULAR; INTRAVENOUS at 20:10

## 2022-10-17 RX ADMIN — FAMOTIDINE 20 MG: 20 TABLET, FILM COATED ORAL at 20:11

## 2022-10-17 RX ADMIN — KETOROLAC TROMETHAMINE 15 MG: 30 INJECTION, SOLUTION INTRAMUSCULAR at 22:25

## 2022-10-17 RX ADMIN — ACETAMINOPHEN 650 MG: 325 TABLET, FILM COATED ORAL at 20:11

## 2022-10-17 RX ADMIN — FAMOTIDINE 20 MG: 10 INJECTION INTRAVENOUS at 08:23

## 2022-10-17 RX ADMIN — SODIUM CHLORIDE, PRESERVATIVE FREE 10 ML: 5 INJECTION INTRAVENOUS at 22:26

## 2022-10-17 RX ADMIN — HYDROXYUREA 500 MG: 500 CAPSULE ORAL at 20:17

## 2022-10-17 RX ADMIN — SODIUM CHLORIDE, PRESERVATIVE FREE 10 ML: 5 INJECTION INTRAVENOUS at 08:23

## 2022-10-17 RX ADMIN — ACETAMINOPHEN 650 MG: 325 TABLET, FILM COATED ORAL at 12:14

## 2022-10-17 RX ADMIN — INSULIN GLARGINE-YFGN 7 UNITS: 100 INJECTION, SOLUTION SUBCUTANEOUS at 20:10

## 2022-10-17 RX ADMIN — WARFARIN SODIUM 5 MG: 5 TABLET ORAL at 17:28

## 2022-10-17 RX ADMIN — METOPROLOL SUCCINATE 25 MG: 25 TABLET, FILM COATED, EXTENDED RELEASE ORAL at 09:21

## 2022-10-17 RX ADMIN — LISINOPRIL 5 MG: 5 TABLET ORAL at 09:21

## 2022-10-17 RX ADMIN — GABAPENTIN 200 MG: 100 CAPSULE ORAL at 18:01

## 2022-10-17 ASSESSMENT — ENCOUNTER SYMPTOMS
NAUSEA: 0
SHORTNESS OF BREATH: 0
FACIAL SWELLING: 0
COUGH: 0
DIARRHEA: 1

## 2022-10-17 ASSESSMENT — PAIN SCALES - GENERAL
PAINLEVEL_OUTOF10: 7
PAINLEVEL_OUTOF10: 10
PAINLEVEL_OUTOF10: 0
PAINLEVEL_OUTOF10: 10

## 2022-10-17 ASSESSMENT — PAIN DESCRIPTION - PAIN TYPE: TYPE: ACUTE PAIN

## 2022-10-17 ASSESSMENT — PAIN DESCRIPTION - DESCRIPTORS: DESCRIPTORS: ACHING;DISCOMFORT

## 2022-10-17 ASSESSMENT — PAIN DESCRIPTION - FREQUENCY: FREQUENCY: CONTINUOUS

## 2022-10-17 ASSESSMENT — PAIN DESCRIPTION - ORIENTATION: ORIENTATION: LEFT;RIGHT

## 2022-10-17 ASSESSMENT — PAIN DESCRIPTION - LOCATION: LOCATION: BACK;SHOULDER

## 2022-10-17 NOTE — CONSULTS
[facility]  Hematology/Oncology  Consult      Patient Name: Haim Valentin II  YOB: 1975  PCP: Jack Espinoza PA-C   Referring Provider:      Reason for Consultation:   Chief Complaint   Patient presents with    Pharyngitis        History of Present Illness: This is a 68-year-old male patient who was following with Dr. Aspen Moe for thrombocytosis on Hydrea 500 mg twice daily. He also has a history of recurrent DVT/PE in which he is on Coumadin and status post an IVC filter placement also requiring catheter directed lysis with tPA/heparin infusing via EKOS catheter in the past. 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 Patient was last seen by our service during hospitalization in September 2021 and was supposed to follow-up outpatient however was lost to follow-up. Patient presented to the ED for evaluation of acute onset of left-sided neck pain. CT soft neck tissue showed abnormal retropharyngeal fluid collection extending from the skull base to the level of C7 measuring about 1 cm in thickness and an edematous epiglottis concerning for acute epiglottitis and acute pharyngitis. Patient was given dexamethasone, Unasyn. He also received tranexamic acid and was transfused 1 unit FFP. ENT was consulted. Due to concerns for airway collapse and decompensation, patient was initially admitted to ICU. Patient's INR was 9.9 .3 Hgb 11.6 WBC 12.9. Vitamin K was given and Coumadin was held. INR is now 1.8. Plan is to resume Coumadin with close monitoring of INR. Consultation for patient with history of multiple VTE's and LE's and spleen, supratherapeutic INR on admission.       Diagnostic Data:     Past Medical History:   Diagnosis Date    Accident 11/2019    stepped on nail rt ft about 1 month ago- healed per patient    Acute thrombosis of inferior vena cava ([address] 10/10/2020    SIMÓN (acute kidney injury) ([address] 2008 apx    kidney bruised due to fall / Cintia Jabs    Allergic rhinitis     Blister of left leg 8/31/2021    Cellulitis of leg, left 8/31/2021    Chronic back pain     Depression     Dermatophytosis 8/31/2021    Diabetes mellitus ([address]     Difficulty sleeping     at times    Displacement of lumbar intervertebral disc without myelopathy     Fibromyalgia     Fractured rib     2008 / healed    H/O seasonal allergies     Head injury 1980'S apx    no residual s/s    History of deep vein thrombosis 8/31/2021    Hyperlipidemia     Hypertension     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    Lymphedema of left leg 8/31/2021    Obesity (BMI 35.0-39.9 without comorbidity)     bmi 39.2  weight 296 #    Osteoarthritis     Recurrent acute deep vein thrombosis (DVT) of left lower extremity ([address] 8/31/2021    S/P IVC filter 8/31/2021    Subtherapeutic international normalized ratio (INR) 8/31/2021    Thoracic or lumbosacral neuritis or radiculitis, unspecified        Patient Active Problem List    Diagnosis Date Noted    Acute epiglottitis with airway obstruction 10/14/2022    Prolonged INR 10/14/2022    Class 2 severe obesity due to excess calories with serious comorbidity in adult [facility]) 10/14/2022    Hernia of abdominal wall 05/12/2022    Incisional hernia of anterior abdominal wall without obstruction or gangrene 03/29/2022    Acute kidney injury (SIMÓN) with acute tubular necrosis (ATN) ([address] 10/08/2021    SIMÓN (acute kidney injury) ([address] 09/08/2021    Cellulitis of left lower extremity 08/31/2021    Dermatophytosis 08/31/2021    Lymphedema of left leg 08/31/2021    Recurrent acute deep vein thrombosis (DVT) of left lower extremity ([address] 08/31/2021    S/P IVC filter 08/31/2021    History of deep vein thrombosis 08/31/2021 Subtherapeutic international normalized ratio (INR) 08/31/2021    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[address] 08/31/2021    Tobacco dependence 11/19/2020    Anticoagulated 11/19/2020    Acute blood loss anemia     Thrombocytosis 08/10/2020    Abnormal findings on diagnostic imaging of gall bladder 08/01/2020    Cyst of spleen 08/01/2020    Other pulmonary embolism without acute cor pulmonale (HCC) 08/01/2020    Rectus diastasis 11/01/2019    Recurrent unilateral inguinal hernia 11/01/2019    Neuropathy 06/30/2019    Left leg swelling 06/30/2019    Other hyperlipidemia 10/13/2017    Primary osteoarthritis of right hip 11/03/2016    Primary osteoarthritis of left hip 04/11/2016    Type 2 diabetes mellitus without complication, with long-term current use of insulin ([facility] 04/08/2016    Lumbar disc herniation 02/22/2016    Lumbar radiculopathy 12/17/2015    Osteoarthritis of spine with radiculopathy, lumbar region 12/17/2015    Class 1 obesity in adult 12/17/2015    Allergic rhinitis 11/23/2015    Essential hypertension 10/16/2015    Vitamin D deficiency 04/14/2015    Fibromyalgia 12/15/2014    Insomnia 07/04/2014    Osteoarthritis 03/27/2014    Lumbar spondylosis 01/07/2014    Neuropathic pain 05/08/2012        Past Surgical History:   Procedure Laterality Date    COLONOSCOPY N/A 9/5/2020    COLONOSCOPY WITH BIOPSY performed by Lolly Skiff, MD at [facility] ENDOSCOPY    CT [facility] NEW ACCESS  8/3/2020    CT [facility] NEW ACCESS 8/3/2020 [facility] CT    FOOT SURGERY Right 1985    to treat shattered bones    HERNIA REPAIR  2001    DOUBLE HERNIA    HERNIA REPAIR Right 12/9/2019    LAPAROSCOPIC RIGHT INGUINAL HERNIA REPAIR, MESH 10x15 cm PLACEMENT performed by Charissa Pham MD at [address] Left 4/11/2016    ILIAC ARTERY STENT INSERTION N/A 10/11/2020    S/P ILIAC STENT PLACEMENT VISUALIZATION performed by Bisi Soriano MD at [address] [facility] N/A 9/18/2020    LAPAROTOMY EXPLORATORY, CHOLECYSTECTOMY, BOWEL RESECTION, right darian colectomy, partial omentectomy, and splenectomy performed by Africa Ann MD at [address] [facility] [address] FLX DX W/MARIA ALEJANDRA Bret 1980 PFRMD N/A 5/7/2018    COLONOSCOPY DIAGNOSTIC performed by Zully Mcqueen MD at [address] Left 2014    Dr. Shanita Smith ARTHROSCOPY Left [address]  2001 &2007    RIGHT AND LEFT repair of tears    THROMBECTOMY / EMBOLECTOMY FEMORAL Left 1/1/2021    LEFT LOWER EXTREMITY, VENOGRAM, FOLLOW UP POSSIBLE REMOVAL LYSIS CATHETER performed by Fidelia Ferrara MD at [address] [facility] / EMBOLECTOMY FEMORAL Left 1/2/2021    LEFT LOWER EXTREMITY VENOGRAM performed by Fidelia Ferrara MD at [facility] Right 10/31/2016    Total right hip  Ryan MD Robson    UPPER GASTROINTESTINAL ENDOSCOPY N/A 9/4/2020    EGD DIAGNOSTIC ONLY performed by Luna Smith MD at [address] [facility] Left 1/3/2021    LEFT LOWER EXTREMITY VENOGRAM, PLACEMENT OF NEW LYSIS CATHETER performed by Fidelia Ferrara MD at [place] [address] N/A 5/12/2022    OPEN [address] [place] performed by Africa Ann MD at [address] [facility]  2007    LEFT WRIST       Family History  Family History   Problem Relation Age of Onset    Hypertension Mother     Arthritis Father     Diabetes Father     Cancer Maternal Grandfather         Skin    Cancer Paternal Uncle         skin    Diabetes Paternal Grandfather     Heart Disease Maternal Grandmother     Stroke Maternal Aunt        Social History    TOBACCO:   reports that he has never smoked. His smokeless tobacco use includes chew. ETOH:   reports that he does not currently use alcohol. Home Medications  Prior to Admission medications    Medication Sig Start Date End Date Taking?  Authorizing Provider   cyclobenzaprine (FLEXERIL) 10 MG tablet Take 10 mg by mouth 3 times daily as needed for Muscle spasms   Yes Historical Provider, MD   fluticasone (FLONASE) 50 MCG/ACT nasal spray 1 spray by Each Nostril route nightly 10/11/22   Renea Ganser, PA-C   lisinopril (PRINIVIL;ZESTRIL) 5 MG tablet take 1 tablet by mouth once daily 10/5/22   Renea Ganser, PA-C   warfarin (COUMADIN) 2.5 MG tablet take as directed 9/27/22   Renea Ganser, PA-C   warfarin (COUMADIN) 5 MG tablet take 1 tablet by mouth every evening with 2.5 MG TABLET FOR A TOTAL DOSE OF 7.5 MG DAILY 9/27/22   Renea Ganser, PA-C   loperamide (IMODIUM) 2 MG capsule take 1 capsule by mouth four times a day if needed for diarrhea 9/6/22   Renea Ganser, PA-C   Continuous Blood Gluc Sensor (FREESTYLE JOANIE 14 DAY SENSOR) MISC 1 Device by Does not apply route continuous 8/22/22   Renea Ganser, PA-C   gabapentin (NEURONTIN) 300 MG capsule Take 1 capsule by mouth 3 times daily for 180 days. Intended supply: 30 days 8/19/22 2/15/23  Renea Ganser, PA-C   insulin glargine (LANTUS) 100 UNIT/ML injection vial Inject 25 Units into the skin nightly 8/12/22   Renea Ganser, PA-C   aspirin 81 MG chewable tablet Take 1 tablet by mouth daily 5/15/22   Dani Kevin MD   sennosides-docusate sodium (SENOKOT-S) 8.6-50 MG tablet Take 1 tablet by mouth 2 times daily 5/14/22   Dani Kevin MD   Insulin Syringe-Needle U-100 30G X 5/16\" 1 ML MISC 1 each by Does not apply route daily Use as directed 3/8/22   Renea Ganser, PA-C   metoprolol succinate (TOPROL XL) 25 MG extended release tablet Take 1 tablet by mouth daily  Patient taking differently: Take 25 mg by mouth nightly 2/14/22   Renea Ganser, PA-C   Continuous Blood Gluc  (FREESTYLE JOANIE READER) SAMARIA 1 Device by Does not apply route continuous 2/14/22   Renea Ganser, PA-C       Allergies  Allergies   Allergen Reactions    Seasonal      HAYFEVER / sneezing,watery eyes    Tramadol Itching       Review of Systems: Mild neck pain otherwise no complaints.     Objective  BP (!) 150/73 Pulse 79   Temp 98.1 °F (36.7 °C) (Oral)   Resp 20   Ht 6' 1\" (1.854 m)   Wt 281 lb (127.5 kg)   SpO2 97%   BMI 37.07 kg/m²     Physical Exam:   Performance Status:  General: AAO to person, place, time, in no acute distress,   Head and neck : PERRLA, EOMI . Sclera non icteric. Oropharynx : Clear  Neck: no JVD,  mild swelling  Heart: Regular rate and regular rhythm, no murmur  Lungs: Clear to auscultation   Extremities: No edema,no cyanosis, no clubbing. Abdomen: Soft, non-tender;no masses, no organomegaly  Skin:  No rash. Neurologic:Cranial nerves grossly intact. No focal motor or sensory deficits .     Recent Laboratory Data-   Lab Results   Component Value Date    WBC 14.8 (H) 10/17/2022    HGB 11.7 (L) 10/17/2022    HCT 37.7 10/17/2022    MCV 92.4 10/17/2022     (H) 10/17/2022    LYMPHOPCT 24.3 10/17/2022    RBC 4.08 10/17/2022    MCH 28.7 10/17/2022    MCHC 31.0 (L) 10/17/2022    RDW 13.7 10/17/2022    NEUTOPHILPCT 65.6 10/17/2022    MONOPCT 9.2 10/17/2022    BASOPCT 0.1 10/17/2022    NEUTROABS 9.71 (H) 10/17/2022    LYMPHSABS 3.61 10/17/2022    MONOSABS 1.37 (H) 10/17/2022    EOSABS 0.01 (L) 10/17/2022    BASOSABS 0.02 10/17/2022       Lab Results   Component Value Date     10/17/2022    K 3.7 10/17/2022     10/17/2022    CO2 24 10/17/2022    BUN 24 (H) 10/17/2022    CREATININE 0.9 10/17/2022    GLUCOSE 131 (H) 10/17/2022    CALCIUM 9.5 10/17/2022    PROT 7.6 10/16/2022    LABALBU 4.0 10/16/2022    BILITOT 0.4 10/16/2022    ALKPHOS 116 10/16/2022    AST 23 10/16/2022    ALT 27 10/16/2022    LABGLOM >60 10/17/2022    GFRAA >60 10/17/2022       Lab Results   Component Value Date    IRON 24 (L) 08/01/2020    TIBC 145 (L) 08/01/2020    FERRITIN 539 10/10/2020           Radiology-    CT SOFT TISSUE NECK W CONTRAST    Result Date: 10/14/2022  EXAMINATION: CT OF THE NECK SOFT TISSUE WITH CONTRAST  10/14/2022 TECHNIQUE: CT of the neck was performed with the administration of intravenous contrast. Multiplanar reformatted images are provided for review. Automated exposure control, iterative reconstruction, and/or weight based adjustment of the mA/kV was utilized to reduce the radiation dose to as low as reasonably achievable. COMPARISON: None. HISTORY: ORDERING SYSTEM PROVIDED HISTORY: left sided throat and neck pain, left submandibular swelling, odynophagia, hoarse voice TECHNOLOGIST PROVIDED HISTORY: Reason for exam:->left sided throat and neck pain, left submandibular swelling, odynophagia, hoarse voice Decision Support Exception - unselect if not a suspected or confirmed emergency medical condition->Emergency Medical Condition (MA) What reading provider will be dictating this exam?->CRC FINDINGS: PHARYNX/LARYNX:  There is an abnormal retropharyngeal fluid collection extending from the skull base to the level C7 measuring 1 cm in thickness. The oropharyngeal mucosa is markedly edematous as is the epiglottis and posterior hypopharyngeal wall. There is resulting mild-to-moderate supraglottic airway narrowing. SALIVARY GLANDS/THYROID:  The parotid glands and submandibular glands are normal in appearance. LYMPH NODES:  There are enlarged upper cervical lymph nodes, left greater than right, likely reactive. SOFT TISSUES:  There is subcutaneous inflammatory stranding within the left side of the neck extending to the midline. BRAIN/ORBITS/SINUSES:  Limited evaluation of the brain and orbits is unremarkable. The paranasal sinuses and mastoid air cells are clear. LUNG APICES/SUPERIOR MEDIASTINUM:  The lung apices are clear. There is no superior mediastinal lymphadenopathy. BONES:  No lytic or blastic osseous lesions are identified. The sequelae of anterior cervical discectomy and fusion at C5-6 is noted. Abnormal retropharyngeal fluid collection consistent with a phlegmon extending from the skull base to the C7 level measuring 1 cm in thickness.  Edematous appearance of the epiglottis concerning for acute epiglottitis. There is mild-to-moderate supraglottic airway narrowing. Diffusely edematous appearance of the oropharynx and hypopharynx consistent with a severe pharyngitis. No drainable abscess identified. ASSESSMENT/PLAN :  78-year-old male   Thrombocytosis was on Hydrea 500 mg twice daily. Recurrent DVT/PE in which he is on Coumadin 7.5 mg and s/p IVC filter placement also requiring catheter directed lysis with tPA/heparin infusing via EKOS catheter in the past.   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 Supratherapeutic INR on admission    - Acute onset of left-sided neck pain. - CT soft neck tissue showed abnormal retropharyngeal fluid collection extending from the skull base to the level of C7 measuring about 1 cm in thickness and an edematous epiglottis concerning for acute epiglottitis and acute pharyngitis   - Given dexamethasone, Unasyn  - ENT was consulted. Due to concerns for airway collapse and decompensation, patient was initially admitted to ICU   - INR was 9.9 .3   - Vitamin K was given and Coumadin was held. S/p tranexamic acid and was transfused 1 unit FFP. INR is now 1.8.   - Plan is to resume Coumadin 5 mg with close monitoring of INR (coumadin clinic)  - Likely epiglottic hematoma  - Hgb 11.6 WBC 12.9. Platelets 814  - Will need to resume hydrea as outpatient. Can begin 500 mg BID for now (had issues with compliance as outpatient an as above, was lost to follow up for ~1 year)       IGOR Gonzalez - CNP  Electronically signed 10/17/2022 at 3:18 PM  Pt seen and examined.  Note updated  Ethan Bonds MD

## 2022-10-17 NOTE — PROGRESS NOTES
OTOLARYNGOLOGY  DAILY PROGRESS NOTE  10/17/2022    Subjective:     Patient is resting comfortably. Continues to improve. Tolerating PO intake. No difficulty breathing. Objective:     BP (!) 148/93   Pulse 79   Temp 98.3 °F (36.8 °C) (Oral)   Resp 17   Ht 6' 1\" (1.854 m)   Wt 281 lb (127.5 kg)   SpO2 96%   BMI 37.07 kg/m²   PULSE OXIMETRY RANGE: SpO2  Av.5 %  Min: 95 %  Max: 100 %  I/O last 3 completed shifts: In: 480 [P.O.:480]  Out: 300 [Urine:300]    GENERAL:  Awake, alert, cooperative, no apparent distress  HENT: Normocephalic, atraumatic. Mucous membranes moist. Uvula midline. No pooling of oral secretions. No trismus, 1+ tonsils equal bilaterally, no peritonsillar edema, tongue is midline nonedematous, dentition poor w/ caries, trachea is midline, cricoid is low, obese neck, left submandibular region neck  to touch, nares very dry bilaterally R > L, external ears and Tms intact without effusion  EYES: No sclera icterus, pupils equal  LUNGS:  No increased work of breathing, no stridor  CARDIOVASCULAR:  RR            Assessment/Plan:     48 y.o. male with epiglottic hematoma secondary to supra-therapeutic INR. Continues to improve. ENT will sign off. Follow up with Dr. Karlo Rutherford as needed but no specific follow up required. Patient  discussed with attending.      Electronically signed by Spencer Gonsales DO on 10/17/2022 at 7:14 AM

## 2022-10-17 NOTE — PROGRESS NOTES
Physician Progress Note      PATIENT:               Joanell Merlin  CSN #:                  [id number]  :                       1975  ADMIT DATE:       10/14/2022 6:55 AM  DISCH DATE:  RESPONDING  PROVIDER #:        Nel Pina MD          QUERY TEXT:    Patient admitted with epiglottic hematoma secondary to supra-therapeutic INR. Noted documentation of hypoxic respiratory failure in 10/15 & 10/16 Internal   med PN. In order to support the diagnosis of hypoxic respiratory failure,   please include additional clinical indicators in your documentation. Or   please document if the diagnosis of hypoxic respiratory failure has been ruled   out after further study. The medical record reflects the following:  Risk Factors: ***  Clinical Indicators: 10/14 ENT consult-  on room air high 90s O2 sat, no   stridor. 10/15 & 10/16 IM PN-  Hypoxic respiratory failure-- on o2 now.   +stridor-- on 5LNC -- on decadron   (no racemic epi). RR15, 98%. 10/15   Otolaryngology PN- Remains clinically stable on room air without stridor. No   increased work of breathing. RR 19, 99%. 10/17 PN-  No increased work of   breathing, no stridor. No difficulty breathing. RR 17, 96%. 10/16 Critical   Care PN- CTAB, No wheezes, No rhonchi, no use of accessory muscles, no acute   distress. RR 24, 99%.   Treatment: CT, consults, steroids, pepecid, benadryl, unasyn, stop lisinopril,   recommend cessation of all ACEI/ARBS, monitoring, labs,  Flexible   nasopharyngolaryngoscopy    Acute Respiratory Failure Clinical Indicators per [employer] Training Guide and   Quick Reference Guide:  pO2 < 60 mmHg or SpO2 (pulse oximetry) < 91% breathing room air  pCO2 > 50 and pH < 7.35  P/F ratio (pO2 / FIO2) < 300  pO2 decrease or pCO2 increase by 10 mmHg from baseline (if known)  Supplemental oxygen of 40% or more  Presence of respiratory distress, tachypnea, dyspnea, shortness of breath,   wheezing  Unable to speak in complete sentences  Use of accessory muscles to breathe  Extreme anxiety and feeling of impending doom  Tripod position  Confusion/altered mental status/obtunded    Thank you,  Latha Mckeon RN, CRCR  Clinical Documentation Improvement  Options provided:  -- Acute Hypoxic Respiratory Failure as evidenced by, Please document   evidence. -- Acute hypoxic Respiratory Failure ruled out after study  -- Acute Hypoxic Respiratory Failure ruled out after study and Chronic Hypoxic   Respiratory Failure confirmed  -- Other - I will add my own diagnosis  -- Disagree - Not applicable / Not valid  -- Disagree - Clinically unable to determine / Unknown  -- Refer to Clinical Documentation Reviewer    PROVIDER RESPONSE TEXT:    Acute Hypoxic Respiratory Failure has been ruled out after study.     Query created by: Obed Srivastava on 10/17/2022 10:25 AM      Electronically signed by:  Lolis Gonzalez MD 10/17/2022 11:29 AM

## 2022-10-17 NOTE — PROGRESS NOTES
[facility]  Internal Medicine Residency Program  Progress Note - House Team 1    Patient:  Nandini Marvin 48 y.o. male MRN: [id number]     Date of Service: 10/17/2022     CC: Acute onset L neck pain, hoarseness, and difficulty breathing  Overnight events: Patient was transferred out of the MICU, INR decreased to 3.5, BG decreased to 123. Subjective     Patient was sitting up comfortably in his bed this morning. Patient stated that they are feeling better, having less pain, less difficulty breathing, but is still reporting L neck tenderness on palpation. Patient reported that he has been drinking liquids and is tolerating it well. He is having burning pain when he drinks cold liquids, but states that he was told it will go away with time. Review of Systems   Constitutional:  Negative for chills, diaphoresis and fever. HENT:  Negative for drooling and facial swelling. Respiratory:  Negative for cough and shortness of breath. Cardiovascular:  Negative for palpitations and leg swelling. Gastrointestinal:  Positive for diarrhea. Negative for nausea. Genitourinary:  Negative for difficulty urinating. Musculoskeletal:  Positive for neck pain. Negative for joint swelling. Skin:  Negative for rash and wound. Neurological:  Negative for dizziness, facial asymmetry and headaches. Psychiatric/Behavioral:  Negative for confusion. Objective     Physical Exam  Constitutional:       General: He is not in acute distress. HENT:      Head: Normocephalic and atraumatic. Eyes:      Extraocular Movements: Extraocular movements intact. Pupils: Pupils are equal, round, and reactive to light. Cardiovascular:      Rate and Rhythm: Normal rate and regular rhythm. Heart sounds: No murmur heard. No friction rub. No gallop. Pulmonary:      Effort: No respiratory distress. Breath sounds: Wheezing (mild) present.    Abdominal:      General: Bowel sounds are normal. There is no distension. Palpations: Abdomen is soft. Musculoskeletal:         General: No swelling. Normal range of motion. Cervical back: Neck supple. Tenderness (L neck) present. No rigidity. Skin:     General: Skin is warm and dry. Neurological:      Mental Status: He is alert. Pertinent Labs & Imaging Studies    PT/INR:    Lab Results   Component Value Date/Time    PROTIME 19.9 10/17/2022 10:10 AM    PROTIME 35.6 08/19/2022 01:02 PM    INR 1.8 10/17/2022 10:10 AM     CBC with Differential:    Lab Results   Component Value Date/Time    WBC 14.8 10/17/2022 11:30 AM    RBC 4.08 10/17/2022 11:30 AM    HGB 11.7 10/17/2022 11:30 AM    HCT 37.7 10/17/2022 11:30 AM     10/17/2022 11:30 AM    MCV 92.4 10/17/2022 11:30 AM    MCH 28.7 10/17/2022 11:30 AM    MCHC 31.0 10/17/2022 11:30 AM    RDW 13.7 10/17/2022 11:30 AM    NRBC 0.9 11/19/2020 06:38 AM    SEGSPCT 80 11/26/2013 01:45 PM    LYMPHOPCT 24.3 10/17/2022 11:30 AM    MONOPCT 9.2 10/17/2022 11:30 AM    BASOPCT 0.1 10/17/2022 11:30 AM    MONOSABS 1.37 10/17/2022 11:30 AM    LYMPHSABS 3.61 10/17/2022 11:30 AM    EOSABS 0.01 10/17/2022 11:30 AM    BASOSABS 0.02 10/17/2022 11:30 AM     BMP:    Lab Results   Component Value Date/Time     10/17/2022 06:36 AM    K 3.7 10/17/2022 06:36 AM    K 4.0 10/16/2022 04:04 AM     10/17/2022 06:36 AM    CO2 24 10/17/2022 06:36 AM    BUN 24 10/17/2022 06:36 AM    LABALBU 4.0 10/16/2022 04:04 AM    LABALBU 4.8 05/11/2012 08:42 AM    CREATININE 0.9 10/17/2022 06:36 AM    CALCIUM 9.5 10/17/2022 06:36 AM    GFRAA >60 10/17/2022 06:36 AM    LABGLOM >60 10/17/2022 06:36 AM    GLUCOSE 131 10/17/2022 06:36 AM    GLUCOSE 125 05/11/2012 08:42 AM       Medical Student's Assessment and Plan     Reginalditzellaw Rodriguez KARLIE is a 48 y.o. male    1. Epiglottic hematoma secondary to supratherapeutic INR (9.9) in the setting of chronic warfarin and history of mulitple thrombotic events - ENT endoscopy r/o angioedema   - Continue monitoring INR (every 12 hours) and evidence of further bleeding or clotting   - Return to regular diet as tolerated   - Restart warfarin  2. HTN   - Restart home metoprolol   3. Normocytic anemia with thrombocytosis    - Monitor CBC daily   - Ordered a peripheral blood smear  4. IDDM   - Continue hypogylcemic protocol   - Continue insulin glargine and insulin lispro  5. Leukocytosis   - Likely due to steroids and not likely due to an infectious cause   - Continue monitoring CBC daily  6. DM neuropathy   - Continue home Gabapentin 300 TID  7.  Hx c-spine fusion, 2000    PT/OT evaluation: not currently indicated  DVT prophylaxis/ GI prophylaxis: None  Disposition: continue admission    Liz Painter OMS-III Medical Student  Attending physician: Dr. Zakia Casillas

## 2022-10-17 NOTE — PROGRESS NOTES
[facility]  Internal Medicine Residency / [address]    Attending Physician Statement  I have discussed the case, including pertinent history and exam findings with the resident and the team.  I have seen and examined the patient and the key elements of the encounter have been performed by me. I agree with the assessment, plan and orders as documented by the resident. A&O  No respiratory distress or stridor  VS stable  Hx of DM and Dermopathy on shins  INR still high and received Vitamin K  Hx of Splenectomy and coagulopathy om warfarin  Will need to resume warfarin when INR becomes subtherapeutic  Plan; Continue to monitor with pharmacy           Hematologist to delineate coagulopathy    Remainder of medical problems as per resident note.       Xochitl Le MD [facility]  Internal Medicine Residency Faculty

## 2022-10-17 NOTE — CARE COORDINATION
Patient with PMHx of HTN, Insulin dependent DM, multiple episodes of DVTs, proximal left leg, s.p insertion IVC filter 9/2021 on chronic anticoagulation with warfarin is admitted with Epiglottic hematoma secondary to supratherapeutic INR (9.9). Hem/onc, Otolaryngology and Rheumatology are all following. Per chart review, Patient lives with his son Daniel Thomas in a 2 story home with 3 MARTHA. DME owned is a FWW, cane, BSC, SC and WC. His PCP is MARTIN Schneider Se and he uses [email or web address] in [place]. He has a hx with [facility] and [facility]geny. PT/OT evals have been ordered to assist with transition of care determination.   Macey Cosme RN CM  [phone]

## 2022-10-17 NOTE — PLAN OF CARE
11 m.o. male, Jagdeep Cash, presents with Cough and Nasal Congestion   Patient just completed Amoxil for ear infections 6 days ago. Cold symptoms had improved over that time. The very next day he developed a wet cough, wheezing, and runny nose as well as nasal congestion. Slight fever at night. Tmax 100.6. Good PO intake and normal urine output. He is not in .     Review of Systems  Review of Systems   Constitutional: Positive for fever and irritability. Negative for appetite change.   HENT: Positive for congestion and rhinorrhea.    Respiratory: Positive for cough and wheezing.    Gastrointestinal: Negative for diarrhea and vomiting.   Genitourinary: Negative for decreased urine volume.   Skin: Negative for rash.      Objective:   Physical Exam  Vitals reviewed.   Constitutional:       General: He is not in acute distress.     Appearance: He is ill-appearing. He is not toxic-appearing.   HENT:      Head: Normocephalic and atraumatic.      Right Ear: Tympanic membrane normal.      Left Ear: Tympanic membrane normal.      Nose: Congestion present. No rhinorrhea.      Mouth/Throat:      Mouth: Mucous membranes are moist.   Eyes:      General: Lids are normal.      Conjunctiva/sclera: Conjunctivae normal.   Cardiovascular:      Rate and Rhythm: Normal rate and regular rhythm.      Pulses: Normal pulses.      Heart sounds: Normal heart sounds, S1 normal and S2 normal.   Pulmonary:      Effort: Pulmonary effort is normal. No respiratory distress.      Breath sounds: Normal air entry. No decreased air movement. Wheezing (end expiratory wheezes at bases) and rhonchi (scattered) present. No rales.   Skin:     General: Skin is warm.      Capillary Refill: Capillary refill takes less than 2 seconds.      Findings: No rash.     Procedure:  Patient underwent nebulized albuterol treatment for wheezing. Patient had resolution of wheezing after treatment was complete but continued scattered rhonchi. No  Problem: Chronic Conditions and Co-morbidities  Goal: Patient's chronic conditions and co-morbidity symptoms are monitored and maintained or improved  10/17/2022 0000 by Jovan Lyon RN  Outcome: Progressing  10/16/2022 1742 by Naomy Subramanian RN  Outcome: Progressing  Flowsheets  Taken 10/16/2022 1600 by Nicolas Norman RN  Care Plan - Patient's Chronic Conditions and Co-Morbidity Symptoms are Monitored and Maintained or Improved:   Monitor and assess patient's chronic conditions and comorbid symptoms for stability, deterioration, or improvement   Collaborate with multidisciplinary team to address chronic and comorbid conditions and prevent exacerbation or deterioration  Taken 10/16/2022 1400 by Nicolas Norman RN  Care Plan - Patient's Chronic Conditions and Co-Morbidity Symptoms are Monitored and Maintained or Improved:   Monitor and assess patient's chronic conditions and comorbid symptoms for stability, deterioration, or improvement   Collaborate with multidisciplinary team to address chronic and comorbid conditions and prevent exacerbation or deterioration  Taken 10/16/2022 1200 by Nicolas Norman RN  Care Plan - Patient's Chronic Conditions and Co-Morbidity Symptoms are Monitored and Maintained or Improved:   Monitor and assess patient's chronic conditions and comorbid symptoms for stability, deterioration, or improvement   Collaborate with multidisciplinary team to address chronic and comorbid conditions and prevent exacerbation or deterioration  Taken 10/16/2022 1000 by Nicolas Norman RN  Care Plan - Patient's Chronic Conditions and Co-Morbidity Symptoms are Monitored and Maintained or Improved:   Monitor and assess patient's chronic conditions and comorbid symptoms for stability, deterioration, or improvement   Collaborate with multidisciplinary team to address chronic and comorbid conditions and prevent exacerbation or deterioration  Taken 10/16/2022 0800 by Nicolas Norman RN  Care Plan - Patient's Chronic Conditions and Co-Morbidity Symptoms are Monitored and Maintained or Improved:   Monitor and assess patient's chronic conditions and comorbid symptoms for stability, deterioration, or improvement   Collaborate with multidisciplinary team to address chronic and comorbid conditions and prevent exacerbation or deterioration     Problem: Safety - Adult  Goal: Free from fall injury  10/17/2022 0000 by Karlo Montana RN  Outcome: Progressing  10/16/2022 1742 by Brea Shaikh RN  Outcome: Progressing     Problem: Pain  Goal: Verbalizes/displays adequate comfort level or baseline comfort level  10/17/2022 0000 by Karlo Montana RN  Outcome: Progressing  10/16/2022 1742 by Brea Shaikh RN  Outcome: Progressing respiratory distress. Appropriate tachycardia noted.     Assessment:     11 m.o. male Jagdeep was seen today for cough and nasal congestion.    Diagnoses and all orders for this visit:    Wheezing  -     albuterol nebulizer solution 1.25 mg  -     albuterol (ACCUNEB) 1.25 mg/3 mL Nebu; Take 3 mLs (1.25 mg total) by nebulization every 4 (four) hours as needed (wheezing).  -     NEBULIZER FOR HOME USE    Family history of asthma in father    Fever in pediatric patient    Follow-up otitis media, resolved  -     cetirizine (ZYRTEC) 1 mg/mL syrup; Take 2.5 mLs (2.5 mg total) by mouth once daily.      Plan:     Spent a total of 30 minutes for this entire patient encounter.   1. Trial of albuterol. In office treatment today. See above. Advised on asthma action plan and when to seek further care. Use Albuterol nebulized q4-6 for the next 1-2 days then prn. Offered testing for flu, COVID, and RSV but parent declined.  Handout provided.

## 2022-10-17 NOTE — PROGRESS NOTES
[facility]  Internal Medicine Residency Program  Progress Note - House Team     Patient:  Benita Vega 48 y.o. male MRN: [id number]     Date of Service: 10/17/2022     CC: Left neck pain and hoarseness  Overnight events: No significant acute overnight event    Subjective     Patient was seen and examined this morning at bedside in no acute distress. He reports improvement in left neck pain and denies shortness of breathing, dysphagia or odynophagia. Patient is tolerating orally without any issue. Vital signs check showed BP in the hypertensive range, 150s/80s, patient commenced on 5 mg lisinopril and 25 mg metoprolol with hydralazine 10 mg every 6 hours as needed. >220>229>123>131>141, patient currently on basal insulin Lantus 7 units low-dose sliding scale. Labs this morning showed improving leukocytosis WBC count 15.7>14.3 S/P stopping IV Decadron, Hb has been stable 10.9>11.3>11.7 and thrombocytosis platelets 872>859>614, INR 9.9>5.6>8.4>3.5>1.8 S/P oral vitamin K 5 mg overnight. Plan today is to resume warfarin with close monitoring of INR and consult hematology for their history of thrombophilia, likely work-up and delineation. Blood smear was also ordered for history of asplenia and thrombocytosis. Objective     Physical Exam:  Vitals: BP (!) 150/73   Pulse 79   Temp 98.1 °F (36.7 °C) (Oral)   Resp 20   Ht 6' 1\" (1.854 m)   Wt 281 lb (127.5 kg)   SpO2 97%   BMI 37.07 kg/m²     I & O - 24hr: No intake/output data recorded. General Appearance: alert, appears stated age, and cooperative  HEENT:  Head: Normocephalic, no lesions, without obvious abnormality.   Neck: supple, symmetrical, trachea midline, lateral neck tenderness appreciated  Lung: Wheezing quite improved this morning  Heart: S1, S2 normal.  Tachycardic  Abdomen:  Soft, tenderness to left lumbar area improving  Extremities:  extremities normal, atraumatic, no cyanosis or edema  Musculokeletal: No joint swelling, no muscle tenderness. ROM normal in all joints of extremities.    Neurologic: Mental status: Alert, oriented, thought content appropriate    Pertinent Labs & Imaging Studies     CBC with Differential:    Lab Results   Component Value Date/Time    WBC 14.3 10/17/2022 04:25 AM    RBC 3.89 10/17/2022 04:25 AM    HGB 11.3 10/17/2022 04:25 AM    HCT 35.5 10/17/2022 04:25 AM     10/17/2022 04:25 AM    MCV 91.3 10/17/2022 04:25 AM    MCH 29.0 10/17/2022 04:25 AM    MCHC 31.8 10/17/2022 04:25 AM    RDW 13.5 10/17/2022 04:25 AM    NRBC 0.9 11/19/2020 06:38 AM    SEGSPCT 80 11/26/2013 01:45 PM    LYMPHOPCT 16.9 10/17/2022 04:25 AM    MONOPCT 10.4 10/17/2022 04:25 AM    BASOPCT 0.1 10/17/2022 04:25 AM    MONOSABS 1.48 10/17/2022 04:25 AM    LYMPHSABS 2.41 10/17/2022 04:25 AM    EOSABS 0.00 10/17/2022 04:25 AM    BASOSABS 0.01 10/17/2022 04:25 AM     CMP:    Lab Results   Component Value Date/Time     10/17/2022 06:36 AM    K 3.7 10/17/2022 06:36 AM    K 4.0 10/16/2022 04:04 AM     10/17/2022 06:36 AM    CO2 24 10/17/2022 06:36 AM    BUN 24 10/17/2022 06:36 AM    CREATININE 0.9 10/17/2022 06:36 AM    GFRAA >60 10/17/2022 06:36 AM    LABGLOM >60 10/17/2022 06:36 AM    GLUCOSE 131 10/17/2022 06:36 AM    GLUCOSE 125 05/11/2012 08:42 AM    PROT 7.6 10/16/2022 04:04 AM    LABALBU 4.0 10/16/2022 04:04 AM    LABALBU 4.8 05/11/2012 08:42 AM    CALCIUM 9.5 10/17/2022 06:36 AM    BILITOT 0.4 10/16/2022 04:04 AM    ALKPHOS 116 10/16/2022 04:04 AM    AST 23 10/16/2022 04:04 AM    ALT 27 10/16/2022 04:04 AM     PT/INR:    Lab Results   Component Value Date/Time    PROTIME 37.6 10/16/2022 10:36 PM    PROTIME 35.6 08/19/2022 01:02 PM    INR 3.5 10/16/2022 10:36 PM     Warfarin PT/INR:  No components found for: PTPATWAR, PTINRWAR    CT SOFT TISSUE NECK W CONTRAST   Final Result   Abnormal retropharyngeal fluid collection consistent with a phlegmon   extending from the skull base to the C7 level measuring 1 cm in thickness. Edematous appearance of the epiglottis concerning for acute epiglottitis. There is mild-to-moderate supraglottic airway narrowing. Diffusely edematous appearance of the oropharynx and hypopharynx consistent   with a severe pharyngitis. No drainable abscess identified. Resident's Assessment and Plan     Assessment and Plan:    Epiglottic hematoma 2/2 supratherapeutic INR s/p ENT endoscopy r/o angioedema  CT soft tissue neck 10/14/2022 - showed retropharyngeal fluid collection, s/o phlegmon extending from skull base to C7, 1 cm with acute epiglottitis and acute pharyngitis and mild to moderate supraglottic airway narrowing  Flexible nasopharyngeal endoscopy 10/14/2022 - showed significant amount of secretions with edematous/omega shaped epiglottis and retropharyngeal swelling as per otolaryngology documentation  Patient could be very difficult intubation secondary to epiglottic swelling and could require tracheostomy as per otolaryngology  Repeat flexible nasopharyngeal endoscopy 10/15/2022 - showed airway appears much more favorable for intubation if needed, left-sided epiglottal hematoma still present  Repeat flexible nasopharyngeal endoscopy 10/16/2022 - showed significant improvement and hematoma in terms of size and TVCs visible  Airway currently stable as per otolaryngology, patient transferred out of CCU  Otolaryngology signed off  Plan:  Continue oral diet as tolerated   Continue Benadryl  Discontinue Unasyn     Supra therapeutic INR, (9.9) in the setting of chronic Coumadin  S/p TXA, FFP x 2  Repeat INR still 9.9, previous in 3.s at home  INR 9.9>5.6>8.4>3.5>1.8, s/p 5 mg vitamin K p.o. overnight  Plan:  Continue to monitor INR every 12 hours  Consider resuming warfarin, resumed  IP consult to hematology     4. Hypertension  On home Toprol XL 25 OD  BP currently elevated in 150s/80s  Plan:  Resume BP medications  Lisinopril 5 mg p.o. daily  Metoprolol 25 mg p.o. daily    Normocytic anemia with thrombocytosis in the setting of thrombophilia  Hg 11.9>10.9>11.7, platelets 995>386>568>685  Plan:  Continue to monitor CBC daily  IP consult to hematology  Peripheral blood smear     Hx recurrent DVTs s/p IVC filter  On chronic coumadin at home  INR currently supratherapeutic  Plan:  Resume Coumadin at 5 mg  Continue to monitor INR 12 hourly  IP consult to hematology     Insulin-dependent diabetes melitis, well controlled  On home Lantus 25 units at home  >220>229>123>131>141  Plan:  Continue Lantus 7 units, LDSS  POCT every 6 hours, goal 140-180  Hypoglycemic protocol    Leukocytosis likely reactive from IV steroids  Patient is hemodynamically stable  WBC count 12.9>9.0>15.7>14.3  Infectious work-up essentially negative  Plan:  Monitor CBC daily --------------------------------------------------------------------------------------------------------------------------------------------------  DM neuropathy, on home Gabapentin 300 TID  Hx c-spine fusion surgery, 2000        PT/OT evaluation: Not indicated  DVT prophylaxis/ GI prophylaxis: No indication  Disposition: admit to CCU    Jessika Miner MD, PGY-1  Attending physician: Dr. Harpal Soriano

## 2022-10-18 LAB
ANION GAP SERPL CALCULATED.3IONS-SCNC: 13 MMOL/L (ref 7–16)
BUN BLDV-MCNC: 24 MG/DL (ref 6–20)
CALCIUM SERPL-MCNC: 8.8 MG/DL (ref 8.6–10.2)
CHLORIDE BLD-SCNC: 103 MMOL/L (ref 98–107)
CO2: 23 MMOL/L (ref 22–29)
CREAT SERPL-MCNC: 1.1 MG/DL (ref 0.7–1.2)
GFR SERPL CREATININE-BSD FRML MDRD: >60 ML/MIN/1.73
GLUCOSE BLD-MCNC: 95 MG/DL (ref 74–99)
INR BLD: 1.3
METER GLUCOSE: 109 MG/DL (ref 74–99)
METER GLUCOSE: 124 MG/DL (ref 74–99)
METER GLUCOSE: 132 MG/DL (ref 74–99)
METER GLUCOSE: 134 MG/DL (ref 74–99)
METER GLUCOSE: 84 MG/DL (ref 74–99)
PATHOLOGIST REVIEW: NORMAL
POTASSIUM SERPL-SCNC: 3.7 MMOL/L (ref 3.5–5)
PROTHROMBIN TIME: 14.1 SEC (ref 9.3–12.4)
SODIUM BLD-SCNC: 139 MMOL/L (ref 132–146)

## 2022-10-18 PROCEDURE — 6370000000 HC RX 637 (ALT 250 FOR IP): Performed by: INTERNAL MEDICINE

## 2022-10-18 PROCEDURE — 97530 THERAPEUTIC ACTIVITIES: CPT

## 2022-10-18 PROCEDURE — 2580000003 HC RX 258: Performed by: STUDENT IN AN ORGANIZED HEALTH CARE EDUCATION/TRAINING PROGRAM

## 2022-10-18 PROCEDURE — 85610 PROTHROMBIN TIME: CPT

## 2022-10-18 PROCEDURE — 36415 COLL VENOUS BLD VENIPUNCTURE: CPT

## 2022-10-18 PROCEDURE — 82962 GLUCOSE BLOOD TEST: CPT

## 2022-10-18 PROCEDURE — 1200000000 HC SEMI PRIVATE

## 2022-10-18 PROCEDURE — 80048 BASIC METABOLIC PNL TOTAL CA: CPT

## 2022-10-18 PROCEDURE — 6370000000 HC RX 637 (ALT 250 FOR IP): Performed by: STUDENT IN AN ORGANIZED HEALTH CARE EDUCATION/TRAINING PROGRAM

## 2022-10-18 PROCEDURE — 99231 SBSQ HOSP IP/OBS SF/LOW 25: CPT | Performed by: INTERNAL MEDICINE

## 2022-10-18 PROCEDURE — 6370000000 HC RX 637 (ALT 250 FOR IP): Performed by: CHIROPRACTOR

## 2022-10-18 PROCEDURE — S5553 INSULIN LONG ACTING 5 U: HCPCS | Performed by: STUDENT IN AN ORGANIZED HEALTH CARE EDUCATION/TRAINING PROGRAM

## 2022-10-18 PROCEDURE — 6360000002 HC RX W HCPCS

## 2022-10-18 PROCEDURE — 97535 SELF CARE MNGMENT TRAINING: CPT

## 2022-10-18 PROCEDURE — 97165 OT EVAL LOW COMPLEX 30 MIN: CPT

## 2022-10-18 PROCEDURE — 6360000002 HC RX W HCPCS: Performed by: INTERNAL MEDICINE

## 2022-10-18 PROCEDURE — 97161 PT EVAL LOW COMPLEX 20 MIN: CPT

## 2022-10-18 RX ORDER — ENOXAPARIN SODIUM 150 MG/ML
120 INJECTION SUBCUTANEOUS 2 TIMES DAILY
Status: DISCONTINUED | OUTPATIENT
Start: 2022-10-18 | End: 2022-10-21 | Stop reason: HOSPADM

## 2022-10-18 RX ORDER — ENOXAPARIN SODIUM 150 MG/ML
120 INJECTION SUBCUTANEOUS ONCE
Status: DISCONTINUED | OUTPATIENT
Start: 2022-10-18 | End: 2022-10-18

## 2022-10-18 RX ORDER — KETOROLAC TROMETHAMINE 30 MG/ML
15 INJECTION, SOLUTION INTRAMUSCULAR; INTRAVENOUS EVERY 6 HOURS PRN
Status: DISPENSED | OUTPATIENT
Start: 2022-10-18 | End: 2022-10-19

## 2022-10-18 RX ORDER — LOPERAMIDE HYDROCHLORIDE 2 MG/1
2 CAPSULE ORAL 4 TIMES DAILY PRN
Status: DISCONTINUED | OUTPATIENT
Start: 2022-10-18 | End: 2022-10-21 | Stop reason: HOSPADM

## 2022-10-18 RX ADMIN — HYDROXYUREA 500 MG: 500 CAPSULE ORAL at 09:04

## 2022-10-18 RX ADMIN — SODIUM CHLORIDE, PRESERVATIVE FREE 10 ML: 5 INJECTION INTRAVENOUS at 09:05

## 2022-10-18 RX ADMIN — FAMOTIDINE 20 MG: 20 TABLET, FILM COATED ORAL at 09:04

## 2022-10-18 RX ADMIN — KETOROLAC TROMETHAMINE 15 MG: 30 INJECTION, SOLUTION INTRAMUSCULAR at 21:24

## 2022-10-18 RX ADMIN — SODIUM CHLORIDE, PRESERVATIVE FREE 10 ML: 5 INJECTION INTRAVENOUS at 21:25

## 2022-10-18 RX ADMIN — WARFARIN SODIUM 5 MG: 5 TABLET ORAL at 19:04

## 2022-10-18 RX ADMIN — INSULIN GLARGINE-YFGN 7 UNITS: 100 INJECTION, SOLUTION SUBCUTANEOUS at 21:26

## 2022-10-18 RX ADMIN — HYDROXYUREA 500 MG: 500 CAPSULE ORAL at 21:25

## 2022-10-18 RX ADMIN — ENOXAPARIN SODIUM 120 MG: 150 INJECTION SUBCUTANEOUS at 21:26

## 2022-10-18 RX ADMIN — ACETAMINOPHEN 650 MG: 325 TABLET, FILM COATED ORAL at 21:24

## 2022-10-18 RX ADMIN — ENOXAPARIN SODIUM 120 MG: 150 INJECTION SUBCUTANEOUS at 12:27

## 2022-10-18 RX ADMIN — LOPERAMIDE HYDROCHLORIDE 2 MG: 2 CAPSULE ORAL at 09:04

## 2022-10-18 RX ADMIN — FAMOTIDINE 20 MG: 20 TABLET, FILM COATED ORAL at 21:24

## 2022-10-18 RX ADMIN — OXYCODONE HYDROCHLORIDE 10 MG: 10 TABLET ORAL at 00:07

## 2022-10-18 RX ADMIN — KETOROLAC TROMETHAMINE 15 MG: 30 INJECTION, SOLUTION INTRAMUSCULAR at 15:18

## 2022-10-18 RX ADMIN — GABAPENTIN 200 MG: 100 CAPSULE ORAL at 21:24

## 2022-10-18 RX ADMIN — LOPERAMIDE HYDROCHLORIDE 2 MG: 2 CAPSULE ORAL at 16:52

## 2022-10-18 RX ADMIN — METOPROLOL SUCCINATE 25 MG: 25 TABLET, FILM COATED, EXTENDED RELEASE ORAL at 09:04

## 2022-10-18 RX ADMIN — LISINOPRIL 5 MG: 5 TABLET ORAL at 09:04

## 2022-10-18 ASSESSMENT — PAIN SCALES - GENERAL
PAINLEVEL_OUTOF10: 10

## 2022-10-18 ASSESSMENT — ENCOUNTER SYMPTOMS
NAUSEA: 0
EYE PAIN: 0
FACIAL SWELLING: 0
TROUBLE SWALLOWING: 0
EYE REDNESS: 0
COUGH: 0
COLOR CHANGE: 0
SORE THROAT: 1
SHORTNESS OF BREATH: 0
DIARRHEA: 1
ABDOMINAL DISTENTION: 0

## 2022-10-18 ASSESSMENT — PAIN DESCRIPTION - DESCRIPTORS: DESCRIPTORS: ACHING;DISCOMFORT;SORE

## 2022-10-18 ASSESSMENT — PAIN DESCRIPTION - ORIENTATION: ORIENTATION: LEFT;RIGHT

## 2022-10-18 ASSESSMENT — PAIN DESCRIPTION - LOCATION: LOCATION: BACK;SHOULDER

## 2022-10-18 NOTE — PROGRESS NOTES
[address]   Internal Medicine Residency / [address]    Attending Physician Statement  I have discussed the case, including pertinent history and exam findings with the resident and the team.  I have seen and examined the patient and the key elements of the encounter have been performed by me. I agree with the assessment, plan and orders as documented by the resident. A&O  Mobile   Will be started on hydroxyurea per Hematology  INR now 1.3  Will bridge with Heparin   Diarrhea chronic probably short colon syndrome  Hx of splenectomy  Remainder of medical problems as per resident note.       Vic Carrion MD [facility]  Internal Medicine Residency Faculty

## 2022-10-18 NOTE — PROGRESS NOTES
[facility]  Internal Medicine Residency Program  Progress Note - House Team     Patient:  Everardo Hodges 48 y.o. male MRN: [id number]     Date of Service: 10/18/2022     CC: Left neck pain and hoarseness  Overnight events: No significant acute overnight event    Subjective     Patient was seen and examined this morning at bedside in no acute distress. He reports improvement in left neck pain and denies shortness of breathing, dysphagia or odynophagia. Patient is tolerating orally without any issue. Patient complained of bilateral chronic shoulder pain worse in the left with associated limitation in ROM. Vital signs stable, /84 well optimized on current antihypertensive regimen. Glycemic control also were optimized, patient currently on basal insulin Lantus 7 units low-dose sliding scale. INR noted to be subtherapeutic this morning 9.9>5.6>8.4>3.5>1.8>1.3, Coumadin 5 mg was commenced yesterday. We will be adding Lovenox 120 mg cutaneous 2 times daily to optimize INR to therapeutic level. We will continue to monitor INR. Toradol 15 mg IV every 6 hours as needed added to assist with shoulder pain control. Input from hematology noted and appreciated, hydroxyurea resumed at 500 mg twice daily. We will follow-up with further recommendations. Objective     Physical Exam:  Vitals: BP (!) 143/92   Pulse 76   Temp 98.3 °F (36.8 °C) (Oral)   Resp 18   Ht 6' 1\" (1.854 m)   Wt 281 lb (127.5 kg)   SpO2 96%   BMI 37.07 kg/m²     I & O - 24hr: No intake/output data recorded. General Appearance: alert, appears stated age, and cooperative  HEENT:  Head: Normocephalic, no lesions, without obvious abnormality.   Neck: supple, symmetrical, trachea midline, lateral neck tenderness appreciated  Lung: Wheezing quite improved this morning  Heart: S1, S2 normal.  Tachycardic  Abdomen:  Soft, tenderness to left lumbar area improving  Extremities:  extremities normal, atraumatic, no cyanosis or edema  Musculokeletal: No joint swelling, no muscle tenderness. ROM normal in all joints of extremities.    Neurologic: Mental status: Alert, oriented, thought content appropriate    Pertinent Labs & Imaging Studies     CBC with Differential:    Lab Results   Component Value Date/Time    WBC 14.8 10/17/2022 11:30 AM    RBC 4.08 10/17/2022 11:30 AM    HGB 11.7 10/17/2022 11:30 AM    HCT 37.7 10/17/2022 11:30 AM     10/17/2022 11:30 AM    MCV 92.4 10/17/2022 11:30 AM    MCH 28.7 10/17/2022 11:30 AM    MCHC 31.0 10/17/2022 11:30 AM    RDW 13.7 10/17/2022 11:30 AM    NRBC 0.9 11/19/2020 06:38 AM    SEGSPCT 80 11/26/2013 01:45 PM    LYMPHOPCT 24.3 10/17/2022 11:30 AM    MONOPCT 9.2 10/17/2022 11:30 AM    BASOPCT 0.1 10/17/2022 11:30 AM    MONOSABS 1.37 10/17/2022 11:30 AM    LYMPHSABS 3.61 10/17/2022 11:30 AM    EOSABS 0.01 10/17/2022 11:30 AM    BASOSABS 0.02 10/17/2022 11:30 AM     CMP:    Lab Results   Component Value Date/Time     10/18/2022 04:20 AM    K 3.7 10/18/2022 04:20 AM    K 4.0 10/16/2022 04:04 AM     10/18/2022 04:20 AM    CO2 23 10/18/2022 04:20 AM    BUN 24 10/18/2022 04:20 AM    CREATININE 1.1 10/18/2022 04:20 AM    GFRAA >60 10/17/2022 06:36 AM    LABGLOM >60 10/18/2022 04:20 AM    GLUCOSE 95 10/18/2022 04:20 AM    GLUCOSE 125 05/11/2012 08:42 AM    PROT 7.6 10/16/2022 04:04 AM    LABALBU 4.0 10/16/2022 04:04 AM    LABALBU 4.8 05/11/2012 08:42 AM    CALCIUM 8.8 10/18/2022 04:20 AM    BILITOT 0.4 10/16/2022 04:04 AM    ALKPHOS 116 10/16/2022 04:04 AM    AST 23 10/16/2022 04:04 AM    ALT 27 10/16/2022 04:04 AM     PT/INR:    Lab Results   Component Value Date/Time    PROTIME 14.1 10/18/2022 04:20 AM    PROTIME 35.6 08/19/2022 01:02 PM    INR 1.3 10/18/2022 04:20 AM     Warfarin PT/INR:  No components found for: PTPATWAR, PTINRWAR    CT SOFT TISSUE NECK W CONTRAST   Final Result   Abnormal retropharyngeal fluid collection consistent with a phlegmon   extending from the skull base to the C7 level measuring 1 cm in thickness. Edematous appearance of the epiglottis concerning for acute epiglottitis. There is mild-to-moderate supraglottic airway narrowing. Diffusely edematous appearance of the oropharynx and hypopharynx consistent   with a severe pharyngitis. No drainable abscess identified. Resident's Assessment and Plan     Assessment and Plan:    Epiglottic hematoma 2/2 supratherapeutic INR s/p ENT endoscopy r/o angioedema  CT soft tissue neck 10/14/2022 - showed retropharyngeal fluid collection, s/o phlegmon extending from skull base to C7, 1 cm with acute epiglottitis and acute pharyngitis and mild to moderate supraglottic airway narrowing  Flexible nasopharyngeal endoscopy 10/14/2022 - showed significant amount of secretions with edematous/omega shaped epiglottis and retropharyngeal swelling as per otolaryngology documentation  Patient could be very difficult intubation secondary to epiglottic swelling and could require tracheostomy as per otolaryngology  Repeat flexible nasopharyngeal endoscopy 10/15/2022 - showed airway appears much more favorable for intubation if needed, left-sided epiglottal hematoma still present  Repeat flexible nasopharyngeal endoscopy 10/16/2022 - showed significant improvement and hematoma in terms of size and TVCs visible  Airway currently stable as per otolaryngology, patient transferred out of CCU 10/16/2022  Otolaryngology signed off  Plan:  Continue oral diet as tolerated      Supra therapeutic INR, (9.9) in the setting of chronic Coumadin  S/p TXA, FFP x 2  Repeat INR still 9.9, previous in 3.s at home  INR 9.9>5.6>8.4>3.5>1.8>1. 3  Coumadin resumed 10/17/2022  Hematology inputs appreciated  Plan:  Starting Lovenox 120 mg subcutaneous twice daily, in light of subtherapeutic INR  Continue to monitor INR every 12 hours    4.   Hypertension  On home Toprol XL 25 OD  BP currently stabilized on current antihypertensive regimen  Plan:  Continue lisinopril 5 mg p.o. daily  Continue metoprolol 25 mg p.o. daily    Normocytic anemia with thrombocytosis in the setting of thrombophilia  Hg 11.9>10.9>11.7, platelets 635>144>822>499  Hematology inputs appreciated, Hydrea resumed yesterday  Plan:  Continue hydroxyurea 500 mg twice daily, for thrombocytosis  Continue to monitor CBC daily  Follow-up peripheral blood smear     Hx recurrent DVTs s/p IVC filter  On chronic coumadin at home  INR currently subtherapeutic, concerning in light of history of thrombophilia  Plan:  Starting Lovenox 120 mg subcutaneous twice daily, in light of subtherapeutic INR  Continue Coumadin at 5 mg  Continue to monitor INR 12 hourly     Insulin-dependent diabetes melitis, well controlled  On home Lantus 25 units at home  >220>229>123>131>141>109  Plan:  Continue Lantus 7 units, LDSS  POCT every 6 hours, goal 140-180  Hypoglycemic protocol    DM neuropath  On home Gabapentin 300 TID,   Plan:   Gabapentin resumed 200 mg nightly  As needed IV Toradol 50 mg every 6 hours added for shoulder pain    History of chronic diarrhea, likely from colectomy  Patient on Imodium at home  Plan:  Resume Imodium 2 mg 4 times as needed    Leukocytosis likely reactive from IV steroids  Patient is hemodynamically stable  WBC count 12.9>9.0>15.7>14.3  Infectious work-up essentially negative  Plan:  Monitor CBC daily --------------------------------------------------------------------------------------------------------------------------------------------------  Hx c-spine fusion surgery, 2000       PT/OT evaluation: Not indicated  DVT prophylaxis/ GI prophylaxis: No indication  Disposition: admit to CCU    Denys Chang MD, PGY-1  Attending physician: Dr. John Friday

## 2022-10-18 NOTE — PROGRESS NOTES
[facility]  Internal Medicine Residency Program  Progress Note - House Team 1    Patient:  Shiloh Zuluaga 48 y.o. male MRN: [id number]     Date of Service: 10/18/2022     CC: Acute onset L neck pain, hoarseness, difficulty breathing  Overnight events: Patient was c/o shoulder pain, L more than R. Toradol 15 mg did not resolve symptoms and 10mg oxycodone was given and improved pain. Subjective     Patient was sitting comfortably, awake but groggy in his bed this morning. He stated that he finally felt like he slept good last night and that the oxycodone improved his shoulder pain. He stated his shoulder pain is because he needs both shoulders replaced and is awaiting for his health to improve before having surgery and sees Dr. Shae Milan for this concern. The patient stated  that he is having no difficulty breathing and mild throat pain when drinking cold liquids. The patient stated that he takes imodium prior to meals at home and is requesting imodium to improve his diarrhea. The patient did not have any other complaints at this time. Review of Systems   Constitutional:  Negative for chills and fatigue. HENT:  Positive for congestion (mild clear nasal discharge), sneezing and sore throat (pain with swallowing cold liquids). Negative for facial swelling and trouble swallowing. Eyes:  Negative for pain and redness. Respiratory:  Negative for cough and shortness of breath. Cardiovascular:  Negative for chest pain and leg swelling. Gastrointestinal:  Positive for diarrhea. Negative for abdominal distention and nausea. Genitourinary:  Negative for difficulty urinating, frequency and urgency. Musculoskeletal:  Negative for arthralgias and myalgias. Skin:  Negative for color change and rash. Neurological:  Negative for dizziness, facial asymmetry and headaches.      Objective     Physical Exam:  Vitals: BP (!) 143/92   Pulse 76   Temp 98.3 °F (36.8 °C) (Oral)   Resp 18 Ht 6' 1\" (1.854 m)   Wt 281 lb (127.5 kg)   SpO2 96%   BMI 37.07 kg/m²     I & O - 24hr: No intake/output data recorded. General Appearance: alert, appears stated age, and cooperative  HEENT:  Head: Normal, normocephalic, atraumatic. Nose: Normal external nose, mucus membranes and septum. Pharynx: Dental Hygiene adequate. Normal buccal mucosa. Normal pharynx. Neck: no adenopathy, no carotid bruit, no JVD, supple, symmetrical, trachea midline, and mild tenderness of the L neck  Lung: clear to auscultation bilaterally  Heart: regular rate and rhythm, S1, S2 normal, no murmur, click, rub or gallop  Abdomen: soft, non-tender; bowel sounds normal; no masses,  no organomegaly  Extremities:  extremities normal, atraumatic, no cyanosis or edema  Musculokeletal: No joint swelling, no muscle tenderness. ROM normal in all joints of extremities.    Neurologic: Mental status: Alert, oriented, thought content appropriate    Subjective  Pertinent Labs & Imaging Studies     CBC:   Lab Results   Component Value Date/Time    WBC 14.8 10/17/2022 11:30 AM    RBC 4.08 10/17/2022 11:30 AM    HGB 11.7 10/17/2022 11:30 AM    HCT 37.7 10/17/2022 11:30 AM    MCV 92.4 10/17/2022 11:30 AM    MCH 28.7 10/17/2022 11:30 AM    MCHC 31.0 10/17/2022 11:30 AM    RDW 13.7 10/17/2022 11:30 AM     10/17/2022 11:30 AM    MPV 9.4 10/17/2022 11:30 AM     BMP:    Lab Results   Component Value Date/Time     10/18/2022 04:20 AM    K 3.7 10/18/2022 04:20 AM    K 4.0 10/16/2022 04:04 AM     10/18/2022 04:20 AM    CO2 23 10/18/2022 04:20 AM    BUN 24 10/18/2022 04:20 AM    LABALBU 4.0 10/16/2022 04:04 AM    LABALBU 4.8 05/11/2012 08:42 AM    CREATININE 1.1 10/18/2022 04:20 AM    CALCIUM 8.8 10/18/2022 04:20 AM    GFRAA >60 10/17/2022 06:36 AM    LABGLOM >60 10/18/2022 04:20 AM    GLUCOSE 95 10/18/2022 04:20 AM    GLUCOSE 125 05/11/2012 08:42 AM     PT/INR:    Lab Results   Component Value Date/Time    PROTIME 14.1 10/18/2022 04:20 AM PROTIME 35.6 08/19/2022 01:02 PM    INR 1.3 10/18/2022 04:20 AM       Medical Student's Assessment and Plan       1. Epiglottic hematoma secondary to supratherapeutic INR (9.9) in the setting of chronic warfarin and history of mulitple thrombotic events - ENT endoscopy r/o angioedema   - Continue monitoring INR every 12 hours and monitor for evidence of bleeding or clotting events  2. HTN   - Continue metoprolol daily   3. Normocytic anemia with thrombocytosis    - Daily CBC monitoring   4. IDDM   - continue insulin glargine and lispro to ensure no hypogylcemic events  5. Leukocytosis              - Likely due to steroids and not likely due to an infectious cause              - Continue monitoring CBC daily  6. DM neuropathy              - Continue home Gabapentin 300 TID  7.  Hx c-spine fusion, 2000    PT/OT evaluation: [facility] 20/24  DVT prophylaxis/ GI prophylaxis: Pepcid  Disposition: continue admission for monitoring INR    Shaheed Lamas [employer] Medical Student  Attending physician: Dr. Juanita Swann

## 2022-10-18 NOTE — PROGRESS NOTES
accessibility. Pt ambulated with FWW prn PTA. Equipment Owned: FWW, cane, ROSE    OBJECTIVE:   Initial Evaluation  Date: 10/18/22 Treatment Short Term/ Long Term   Goals   AM-PAC 6 Clicks 27/21     Was pt agreeable to Eval/treatment? Yes     Does pt have pain? L LE, B shoulders and LBP 6/10     Bed Mobility  Rolling: Indep  Supine to sit: Supervision  Sit to supine: Supervision  Scooting: Supervision  Indep   Transfers Sit to stand: SBA  Stand to sit: SBA  Stand pivot: SBA  Indep   Ambulation    75 feet x2 SBA with seated rest break  >300 feet Indep   Stair negotiation: ascended and descended  8 steps with unilateral SBA  14 steps x2 with unilateral rail Mod I   ROM BUE:  WFL  BLE:  WFL     Strength BUE:  Refer to OT  BLE:  R LE 5/5, L LE 4/5  5/5   Balance Sitting EOB:  Supervision  Dynamic Standing:  SBA  Sitting EOB:  Indep  Dynamic Standing:   Indep     Pt is A & O x 4  Sensation:  Pt denies numbness and tingling to extremities  Edema:  unremarkable    Vitals:  Blood Pressure at rest - Blood Pressure post session -   Heart Rate at rest 92 Heart Rate during activity 134  Heart Rate post session 98   SPO2 at rest 96 SPO2 post session 97       Patient education  Pt educated on role and benefits of physical therapy, safety and technique for mobility    Patient response to education:   Pt verbalized understanding Pt demonstrated skill Pt requires further education in this area   x x x     ASSESSMENT:    Conditions Requiring Skilled Therapeutic Intervention:    [x]Decreased strength     []Decreased ROM  [x]Decreased functional mobility  [x]Decreased balance   []Decreased endurance   []Decreased posture  []Decreased sensation  []Decreased coordination   []Decreased vision  []Decreased safety awareness   [x]Increased pain       Comments:  Patient deemed medically stable prior to therapy evaluation. Patient was supine in bed resting upon therapy arrival and provided consent to evaluation.  He states that he has mild pain in L LE secondary to DVTs. Education regarding self assessment during activity to monitor exertion and pain. Ambulation had mild deficits including narrow MANPREET, decreased stride length and foot clearance. Distance limited by SOB 7/10 on dyspnea scale. Transport chair utilized to access steps. SOB again limiting steps but no LOB or other deficits revealed. Patient left EOB with all needs met and call light in reach for safety. Treatment:  Patient practiced and was instructed in the following treatment:    Bed mobility training - pt given verbal and tactile cues to facilitate proper sequencing and safety during rolling and supine>sit as well as provided with physical assistance to complete task   Sitting EOB for >5 minutes for upright tolerance, postural awareness and BLE ROM  Transfer training - pt was given verbal and tactile cues to facilitate proper hand placement, technique and safety during sit to stand and stand to sit as well as provided with physical assistance. Gait training- pt was given verbal and tactile cues to facilitate safe dotty and upright posture during ambulation   Stair training - pt was given verbal and tactile cues to facilitate home simulation during stair negotiation as well as provided stand by assistance for safety. Pt's/ family goals   1. Return to PLOF    Prognosis is good for reaching above PT goals. Patient and or family understand(s) diagnosis, prognosis, and plan of care.   Yes    PHYSICAL THERAPY PLAN OF CARE:    PT POC is established based on physician order and patient diagnosis     Referring provider/PT Order:     PT eval and treat  Start:  10/17/22 1645,   End:  10/17/22 1645,   ONE TIME,   Standing Count:  1 Occurrences,   R         Gail Duarte MD     Diagnosis:  Supratherapeutic INR [R79.1]  Acute epiglottitis with airway obstruction [J05.11]  Specific instructions for next treatment:  progress distance with GT    Current Treatment Recommendations:     [x] Strengthening to improve independence with functional mobility   [] ROM to improve independence with functional mobility   [] Balance Training to improve static/dynamic balance and to reduce fall risk  [x] Endurance Training to improve activity tolerance during functional mobility   [] Transfer Training to improve safety and independence with all functional transfers   [] Gait Training to improve gait mechanics, endurance and assess need for appropriate assistive device  [x] Stair Training in preparation for safe discharge home and/or into the community   [] Positioning to prevent skin breakdown and contractures  [x] Safety and Education Training   [x] Patient/Caregiver Education   [] HEP  [] Other     PT long term treatment goals are located in above grid    Frequency of treatments: 2-5x/week x 1-2 weeks. Time in  0830  Time out  0910    Total Treatment Time  30 minutes     Evaluation Time includes thorough review of current medical information, gathering information on past medical history/social history and prior level of function, completion of standardized testing/informal observation of tasks, assessment of data and education on plan of care and goals.     CPT codes:  [x] Low Complexity PT evaluation 82213  [] Moderate Complexity PT evaluation 04376  [] High Complexity PT evaluation 26598  [] PT Re-evaluation 07864  [] Gait training 02458 - minutes  [] Manual therapy 13556 - minutes  [x] Therapeutic activities 72413 30 minutes  [] Therapeutic exercises 37885 - minutes  [] Neuromuscular reeducation 68060 - minutes     George Lim, PT, DPT [id number]

## 2022-10-18 NOTE — CARE COORDINATION
Per Dr. Abbie Jade note today, A&O. Mobile. Will be started on hydroxyurea per Hematology. INR now 1.3. Will bridge with Heparin. Diarrhea chronic probably short colon syndrome. Pt lives with his son in a two story home with 3 stairs to enter and one rail. Bed is on basement floor and bath is on second floor with two flights of stairs with one rail to access. He is able to stay on main level with half bath as needed, and son is attempting to finish full bath basement level for increased accessibility. Pt ambulated with FWW prn PTA. Equipment Owned: SEPIDEH, cane, BSC. PT Ampac score 20/24. Cm/Sw is following  for any discharge needs that may arise.    Donna Martinez RN CM  [phone]

## 2022-10-18 NOTE — PROGRESS NOTES
[address]        MJKW:                                                  Patient Name: Zulay Garcia    MRN: [id number]    : 1975    Room: [address] [place] [address]      Evaluating OT: Loly Tsang, [address] Ali Annabi OTR/L; [id number]      Referring Provider: Owen Villa MD    Specific Provider Orders/Date: OT Eval and Treat 10/17/22      Diagnosis: Acute epiglottitis with airway obstruction     Surgery: none this admission     Pertinent Medical History:  has a past medical history of Accident, Acute thrombosis of inferior vena cava ([address] SIMÓN (acute kidney injury) ([address] Allergic rhinitis, Blister of left leg, Cellulitis of leg, left, Chronic back pain, Depression, Dermatophytosis, Diabetes mellitus ([address] Difficulty sleeping, Displacement of lumbar intervertebral disc without myelopathy, Fibromyalgia, Fractured rib, H/O seasonal allergies, Head injury, History of deep vein thrombosis, Hyperlipidemia, Hypertension, 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[address] Lymphedema of left leg, Obesity (BMI 35.0-39.9 without comorbidity), Osteoarthritis, Recurrent acute deep vein thrombosis (DVT) of left lower extremity ([address] S/P IVC filter, Subtherapeutic international normalized ratio (INR), and Thoracic or lumbosacral neuritis or radiculitis, unspecified.       Reason for Admission: sore throat, increased with swallowing, pain in left jaw and progressed to L sided of neck along with swelling    Recommended Adaptive Equipment:  owns necessary equipment     Precautions:  Fall Risk, L eye blindness, Proximal DVT LLE     Assessment of current deficits:    [x] Functional mobility  [x]ADLs  [x] Strength               []Cognition    [x] Functional transfers   [x] IADLs         [x] Safety Awareness   [x]Endurance    [x] Fine Coordination              [x] Balance      [] Vision/perception   []Sensation []Gross Motor Coordination  [] ROM  [] Delirium                   [] Motor Control     OT PLAN OF CARE   OT POC based on physician orders, patient diagnosis and results of clinical assessment    Frequency/Duration: 1-3 days/wk for 2 weeks PRN   Specific OT Treatment Interventions to include:   * Instruction/training on adapted ADL techniques and AE recommendations to increase functional independence within precautions       * Training on energy conservation strategies, correct breathing pattern and techniques to improve independence/tolerance for self-care routine  * Functional transfer/mobility training/DME recommendations for increased independence, safety, and fall prevention  * Patient/Family education to increase follow through with safety techniques and functional independence  * Recommendation of environmental modifications for increased safety with functional transfers/mobility and ADLs    Home Living: Pt lives with son in 2 story home in basement level of home 3 steps and 1HR to enter and 5 steps down with 1HR to access bedroom. Bathroom located on 2nd floor with full flight and 1HR to access. Pt reports able to stay on main floor with 1/2 bath when discharged home. Laundry located in basement with full flight of stairs and 1HR to access.   Bathroom setup: tub/shower unit and tub transfer bench, standard toilet  Equipment owned: cane, ww, tub transfer bench     Prior Level of Function: independent with ADLs   assist from son with IADLs - including groceries and cooking, pt completes laundry as it located in basement near bedroom   Ambulated with ww PRN prior  Driving: no - children  Occupation: retired      Pain Level: chronic BUE pain and BLE pain 6/10  Cognition: A&O: 4/4   Follows multi step directions              Memory:  good              Sequencing:  fair              Problem solving:  fair              Judgement/safety:  fair                Functional Assessment:  AM-PAC Daily Activity Raw Score: 15/24    Initial Eval Status  Date: 10/18/22 Treatment Status  Date: STGs = LTGs  Time frame: 10-14 days   Feeding Independent        Grooming Stand by Assist   Washing hand standing sink side   Independent    UB Dressing Stand by Assist   Overhead shirt donned prior to session   Independent    LB Dressing Stand By Assist   Shemar slide on shoes seated EOB   Independent    Bathing Stand By [employer] on continued used of of tub transfer bench for safety/endurance   Independent    Toileting Stand By Assist   Simulated lower surface transfer    Independent    Bed Mobility  Log Roll: NT  Supine to sit: Sup  Sit to supine: Sup   Supine to sit: Independent   Sit to supine: Independent    Functional Transfers Sit to stand: SBA  Stand to sit: SBA  Stand pivot: SBA  Commode: SBA   Sit to stand: Independent   Stand to sit: Independent   Stand pivot: Independent   Commode: Independent    Functional Mobility SBA no AD   Within room and bathroom   Independent    Balance Sitting:     Static - Sup     Dynamic - Sup  Standing: SBA   Sitting:  Static: IND  Dynamic: IND  Standing: IND   Activity Tolerance Fair -   Limited d/t BLE pain   Good   Visual/  Perceptual Glasses: no      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 Flexion) 4/5 good  and wfl FMC/dexterity noted during ADL tasks    Improve overall RUE strength to 4+/5 for participation in functional tasks         LUE Grossly WFL (~120 degrees sh. Flexion) 4/5 Good  and wfl FMC/dexterity noted during ADL tasks    Improve overall LUE strength to 4+/5 for participation in functional tasks         Hearing: [facility]   Sensation:  chronic BLE neuropathy, B hand numbness   Tone: WFL   Edema: unremarkable    Patient/Family Goal: pt goal is to return home    Comment: Cleared by RN to see pt. Upon arrival patient lying supine in bed and agreeable to OT session. At end of session, patient seated in bedside chair with call light and phone within reach, all lines and tubes intact. Overall patient demonstrated  decreased independence and safety during completion of ADL/functional transfer/mobility tasks. Pt would benefit from continued skilled OT to increase safety and independence with completion of ADL/IADL tasks for functional independence and quality of life. Treatment:  Pt required vc's for proper technique/safety with hand placement/body mechanics/posture for bed mobility/ADLs/functional tranfers/mobility/ww management. Pt required vc's for sequencing/initiation of ADLs/functional transfers. Pt donned shoes seated EOB. Pt able to  sit EOB ~5 mins and at sink ~2 mins to increase core strength/balance/activity tolerance for ease with ADLs. Ambulated from bed to bathroom and to bedside chair. Educated on gait speed and Pt required increased time to complete ADLs/functional transfers due to management of multiple lines. Pt required skilled monitoring of SpO2 during session and education on energy conservation techniques. Pt required rest breaks during session and educated on pursed lip breathing. Pt appeared to have tolerated session well and appears motivated/cooperative/pleasant . Pt instructed on use of call light for assistance and fall prevention. Pt demo'ing fair understanding of education provided. Continue to educate. Rehab Potential: Good  for established goals       LTG: maximize independence with ADLs to return to PLOF    Patient and/or family were instructed on functional diagnosis, prognosis/goals and OT plan of care. Demonstrated FAIR understanding. [] Malnutrition indicators have been identified and nursing has been notified to ensure a dietitian consult is ordered.         Eval Complexity: Low     Evaluation time includes thorough review of current medical information, gathering information on past medical & social history & PLOF, completion of standardized testing, informal observation of tasks, consultation with other medical professions/disciplines, assessment of data & development of POC/goals. Time In: 1545  Time Out: 1408  Total Treatment Time: 8    Min Units   OT Eval Low 61986  x     OT Eval Medium 28789      OT Eval High 05910      OT Re-Eval [id number]       Therapeutic Ex 62423       Therapeutic Activities 01738       ADL/Self Care 24924  8  1   Orthotic Management 98883       Manual 05631     Neuro Re-Ed 17077       Non-Billable Time          Evaluation Time additionally includes thorough review of current medical information, gathering information on past medical history/social history and prior level of function, interpretation of standardized testing/informal observation of tasks, assessment of data and development of plan of care and goals.          SIVA Bethea OTR/L; [id number]

## 2022-10-19 LAB
BASOPHILS ABSOLUTE: 0.03 E9/L (ref 0–0.2)
BASOPHILS RELATIVE PERCENT: 0.2 % (ref 0–2)
BLOOD CULTURE, ROUTINE: NORMAL
CULTURE, BLOOD 2: NORMAL
EOSINOPHILS ABSOLUTE: 1.13 E9/L (ref 0.05–0.5)
EOSINOPHILS RELATIVE PERCENT: 8.9 % (ref 0–6)
HCT VFR BLD CALC: 38.1 % (ref 37–54)
HEMOGLOBIN: 12.1 G/DL (ref 12.5–16.5)
IMMATURE GRANULOCYTES #: 0.04 E9/L
IMMATURE GRANULOCYTES %: 0.3 % (ref 0–5)
INR BLD: 1.3
LYMPHOCYTES ABSOLUTE: 3.8 E9/L (ref 1.5–4)
LYMPHOCYTES RELATIVE PERCENT: 30.1 % (ref 20–42)
MCH RBC QN AUTO: 28.7 PG (ref 26–35)
MCHC RBC AUTO-ENTMCNC: 31.8 % (ref 32–34.5)
MCV RBC AUTO: 90.3 FL (ref 80–99.9)
METER GLUCOSE: 108 MG/DL (ref 74–99)
METER GLUCOSE: 120 MG/DL (ref 74–99)
METER GLUCOSE: 134 MG/DL (ref 74–99)
METER GLUCOSE: 136 MG/DL (ref 74–99)
MONOCYTES ABSOLUTE: 1.39 E9/L (ref 0.1–0.95)
MONOCYTES RELATIVE PERCENT: 11 % (ref 2–12)
NEUTROPHILS ABSOLUTE: 6.25 E9/L (ref 1.8–7.3)
NEUTROPHILS RELATIVE PERCENT: 49.5 % (ref 43–80)
PDW BLD-RTO: 13.7 FL (ref 11.5–15)
PLATELET # BLD: 732 E9/L (ref 130–450)
PMV BLD AUTO: 9.6 FL (ref 7–12)
PROTHROMBIN TIME: 14.1 SEC (ref 9.3–12.4)
RBC # BLD: 4.22 E12/L (ref 3.8–5.8)
WBC # BLD: 12.6 E9/L (ref 4.5–11.5)

## 2022-10-19 PROCEDURE — 6370000000 HC RX 637 (ALT 250 FOR IP): Performed by: STUDENT IN AN ORGANIZED HEALTH CARE EDUCATION/TRAINING PROGRAM

## 2022-10-19 PROCEDURE — 1200000000 HC SEMI PRIVATE

## 2022-10-19 PROCEDURE — 6360000002 HC RX W HCPCS

## 2022-10-19 PROCEDURE — 82962 GLUCOSE BLOOD TEST: CPT

## 2022-10-19 PROCEDURE — 85025 COMPLETE CBC W/AUTO DIFF WBC: CPT

## 2022-10-19 PROCEDURE — 6370000000 HC RX 637 (ALT 250 FOR IP): Performed by: INTERNAL MEDICINE

## 2022-10-19 PROCEDURE — 36415 COLL VENOUS BLD VENIPUNCTURE: CPT

## 2022-10-19 PROCEDURE — 2580000003 HC RX 258: Performed by: STUDENT IN AN ORGANIZED HEALTH CARE EDUCATION/TRAINING PROGRAM

## 2022-10-19 PROCEDURE — 85610 PROTHROMBIN TIME: CPT

## 2022-10-19 PROCEDURE — 99231 SBSQ HOSP IP/OBS SF/LOW 25: CPT | Performed by: INTERNAL MEDICINE

## 2022-10-19 PROCEDURE — 6360000002 HC RX W HCPCS: Performed by: STUDENT IN AN ORGANIZED HEALTH CARE EDUCATION/TRAINING PROGRAM

## 2022-10-19 PROCEDURE — 6370000000 HC RX 637 (ALT 250 FOR IP): Performed by: CHIROPRACTOR

## 2022-10-19 PROCEDURE — S5553 INSULIN LONG ACTING 5 U: HCPCS | Performed by: STUDENT IN AN ORGANIZED HEALTH CARE EDUCATION/TRAINING PROGRAM

## 2022-10-19 PROCEDURE — 86235 NUCLEAR ANTIGEN ANTIBODY: CPT

## 2022-10-19 PROCEDURE — 6360000002 HC RX W HCPCS: Performed by: INTERNAL MEDICINE

## 2022-10-19 PROCEDURE — 6370000000 HC RX 637 (ALT 250 FOR IP)

## 2022-10-19 RX ORDER — KETOROLAC TROMETHAMINE 30 MG/ML
15 INJECTION, SOLUTION INTRAMUSCULAR; INTRAVENOUS ONCE
Status: COMPLETED | OUTPATIENT
Start: 2022-10-19 | End: 2022-10-19

## 2022-10-19 RX ORDER — FLUTICASONE PROPIONATE 50 MCG
1 SPRAY, SUSPENSION (ML) NASAL DAILY
Status: DISCONTINUED | OUTPATIENT
Start: 2022-10-19 | End: 2022-10-21 | Stop reason: HOSPADM

## 2022-10-19 RX ORDER — WARFARIN SODIUM 5 MG/1
5 TABLET ORAL DAILY
Status: DISCONTINUED | OUTPATIENT
Start: 2022-10-19 | End: 2022-10-21 | Stop reason: DRUGHIGH

## 2022-10-19 RX ORDER — CHOLESTYRAMINE 4 G/9G
1 POWDER, FOR SUSPENSION ORAL DAILY
Status: DISCONTINUED | OUTPATIENT
Start: 2022-10-19 | End: 2022-10-21 | Stop reason: HOSPADM

## 2022-10-19 RX ADMIN — FLUTICASONE PROPIONATE 1 SPRAY: 50 SPRAY, METERED NASAL at 12:35

## 2022-10-19 RX ADMIN — ENOXAPARIN SODIUM 120 MG: 150 INJECTION SUBCUTANEOUS at 09:14

## 2022-10-19 RX ADMIN — INSULIN GLARGINE-YFGN 7 UNITS: 100 INJECTION, SOLUTION SUBCUTANEOUS at 21:24

## 2022-10-19 RX ADMIN — FAMOTIDINE 20 MG: 20 TABLET, FILM COATED ORAL at 21:21

## 2022-10-19 RX ADMIN — METOPROLOL SUCCINATE 25 MG: 25 TABLET, FILM COATED, EXTENDED RELEASE ORAL at 09:14

## 2022-10-19 RX ADMIN — CHOLESTYRAMINE POWDER FOR SUSPENSION 4 G: 4 POWDER, FOR SUSPENSION ORAL at 10:53

## 2022-10-19 RX ADMIN — KETOROLAC TROMETHAMINE 15 MG: 30 INJECTION, SOLUTION INTRAMUSCULAR at 10:53

## 2022-10-19 RX ADMIN — WARFARIN SODIUM 5 MG: 5 TABLET ORAL at 12:36

## 2022-10-19 RX ADMIN — SODIUM CHLORIDE, PRESERVATIVE FREE 10 ML: 5 INJECTION INTRAVENOUS at 21:22

## 2022-10-19 RX ADMIN — LISINOPRIL 5 MG: 5 TABLET ORAL at 09:14

## 2022-10-19 RX ADMIN — ENOXAPARIN SODIUM 120 MG: 150 INJECTION SUBCUTANEOUS at 21:22

## 2022-10-19 RX ADMIN — KETOROLAC TROMETHAMINE 15 MG: 30 INJECTION, SOLUTION INTRAMUSCULAR at 22:52

## 2022-10-19 RX ADMIN — KETOROLAC TROMETHAMINE 15 MG: 30 INJECTION, SOLUTION INTRAMUSCULAR at 04:30

## 2022-10-19 RX ADMIN — CAMPHOR, MENTHOL: 90; 13 OINTMENT TOPICAL at 14:42

## 2022-10-19 RX ADMIN — LOPERAMIDE HYDROCHLORIDE 2 MG: 2 CAPSULE ORAL at 17:27

## 2022-10-19 RX ADMIN — CAMPHOR, MENTHOL: 90; 13 OINTMENT TOPICAL at 21:24

## 2022-10-19 RX ADMIN — HYDROXYUREA 500 MG: 500 CAPSULE ORAL at 09:14

## 2022-10-19 RX ADMIN — ACETAMINOPHEN 650 MG: 325 TABLET, FILM COATED ORAL at 21:21

## 2022-10-19 RX ADMIN — GABAPENTIN 200 MG: 100 CAPSULE ORAL at 21:21

## 2022-10-19 RX ADMIN — LOPERAMIDE HYDROCHLORIDE 2 MG: 2 CAPSULE ORAL at 10:53

## 2022-10-19 RX ADMIN — SODIUM CHLORIDE, PRESERVATIVE FREE 10 ML: 5 INJECTION INTRAVENOUS at 09:15

## 2022-10-19 RX ADMIN — HYDROXYUREA 500 MG: 500 CAPSULE ORAL at 21:22

## 2022-10-19 RX ADMIN — FAMOTIDINE 20 MG: 20 TABLET, FILM COATED ORAL at 09:14

## 2022-10-19 RX ADMIN — LOPERAMIDE HYDROCHLORIDE 2 MG: 2 CAPSULE ORAL at 04:30

## 2022-10-19 ASSESSMENT — PAIN DESCRIPTION - ORIENTATION: ORIENTATION: RIGHT;LEFT

## 2022-10-19 ASSESSMENT — PAIN SCALES - GENERAL
PAINLEVEL_OUTOF10: 10
PAINLEVEL_OUTOF10: 8
PAINLEVEL_OUTOF10: 10
PAINLEVEL_OUTOF10: 6

## 2022-10-19 ASSESSMENT — PAIN DESCRIPTION - FREQUENCY: FREQUENCY: CONTINUOUS

## 2022-10-19 ASSESSMENT — PAIN - FUNCTIONAL ASSESSMENT: PAIN_FUNCTIONAL_ASSESSMENT: PREVENTS OR INTERFERES SOME ACTIVE ACTIVITIES AND ADLS

## 2022-10-19 ASSESSMENT — PAIN DESCRIPTION - ONSET: ONSET: ON-GOING

## 2022-10-19 ASSESSMENT — PAIN DESCRIPTION - DESCRIPTORS: DESCRIPTORS: ACHING;DISCOMFORT;SORE

## 2022-10-19 ASSESSMENT — PAIN DESCRIPTION - LOCATION: LOCATION: SHOULDER

## 2022-10-19 NOTE — CARE COORDINATION
Per Dr. Grant Spar note today, A&O. Up in chair. VS stable. Frequent diarrhea and may be post cholecystectomy vs short Colon. Will try Questran daily. INR 1.3. Pt lives with his son in a two story home with 3 stairs to enter and one rail. Bed is on basement floor and bath is on second floor with two flights of stairs with one rail to access. He is able to stay on main level with half bath as needed, and son is attempting to finish full bath basement level for increased accessibility. Pt ambulated with FWW prn PTA. Equipment Owned: SEPIDEH, cane, BSC. PT Ampac score 20/24. Cm/Sw is following  for any discharge needs that may arise.    Amanda Osorio RN CM  [phone]

## 2022-10-19 NOTE — PROGRESS NOTES
no masses,  no organomegaly  Extremities:  extremities normal, atraumatic, no cyanosis or edema  Musculokeletal: No joint swelling, no muscle tenderness. ROM normal in all joints of extremities. Neurologic: Mental status: Alert, oriented, thought content appropriate  Subject  Pertinent Labs & Imaging Studies   rafa  CBC with Differential:    Lab Results   Component Value Date/Time    WBC 12.6 10/19/2022 10:10 AM    RBC 4.22 10/19/2022 10:10 AM    HGB 12.1 10/19/2022 10:10 AM    HCT 38.1 10/19/2022 10:10 AM     10/19/2022 10:10 AM    MCV 90.3 10/19/2022 10:10 AM    MCH 28.7 10/19/2022 10:10 AM    MCHC 31.8 10/19/2022 10:10 AM    RDW 13.7 10/19/2022 10:10 AM    NRBC 0.9 11/19/2020 06:38 AM    SEGSPCT 80 11/26/2013 01:45 PM    LYMPHOPCT 30.1 10/19/2022 10:10 AM    MONOPCT 11.0 10/19/2022 10:10 AM    BASOPCT 0.2 10/19/2022 10:10 AM    MONOSABS 1.39 10/19/2022 10:10 AM    LYMPHSABS 3.80 10/19/2022 10:10 AM    EOSABS 1.13 10/19/2022 10:10 AM    BASOSABS 0.03 10/19/2022 10:10 AM     Calcium:    Lab Results   Component Value Date/Time    CALCIUM 8.8 10/18/2022 04:20 AM     Ionized Calcium:  No results found for: IONCA    CT SOFT TISSUE NECK W CONTRAST   Final Result   Abnormal retropharyngeal fluid collection consistent with a phlegmon   extending from the skull base to the C7 level measuring 1 cm in thickness. Edematous appearance of the epiglottis concerning for acute epiglottitis. There is mild-to-moderate supraglottic airway narrowing. Diffusely edematous appearance of the oropharynx and hypopharynx consistent   with a severe pharyngitis. No drainable abscess identified. Resident's Assessment and Plan     Assessment and Plan: 1. Epiglottic hematoma 2/2 supratherapeutic INR s/p ENT endoscopy r/o angioedema  - improvement in the neck pain and no odynophagia  - CT soft tissue neck 10/14/2022 - showed retropharyngeal fluid collection, s/o phlegmon extending from skull base to C7, 1 cm with acute epiglottitis and acute pharyngitis and mild to moderate supraglottic airway narrowing  - Flexible nasopharyngeal endoscopy 10/14/2022 - showed significant amount of secretions with edematous/omega shaped epiglottis and retropharyngeal swelling as per otolaryngology documentation  - Repeat flexible nasopharyngeal endoscopy 10/15/2022 - showed airway appears much more favorable for intubation if needed, left-sided epiglottal hematoma still present  - Repeat flexible nasopharyngeal endoscopy 10/16/2022 - showed significant improvement and hematoma in terms of size and TVCs visible  - Airway currently stable as per otolaryngology, patient transferred out of CCU 10/16/2022  Plan:  - Continue oral diet as tolerated      2. Supra therapeutic INR, (9.9) in the setting of chronic Coumadin  - S/p TXA, FFP x 2  - Repeat INR still 9.9, previous in 3.s at home  - INR 9.9>5.6>8.4>3.5>1.8>1.3>1.3  - Coumadin 5mg resumed 10/17/2022  - Hematology inputs appreciated  Plan:  - Continue on Lovenox 120 mg subcutaneous twice daily for bridging  - Continue to monitor INR every 12 hours  -      3. Hypertension  - On home Toprol XL 25 OD  - BP currently stabilized on current antihypertensive regimen  Plan:  - Continue lisinopril 5 mg p.o. daily  - Continue metoprolol 25 mg p.o. daily     4. Normocytic anemia with thrombocytosis in the setting of thrombophilia  - Hg 11.9>10.9>11.7>12.1, platelets 680>432>890>046>676  - Hematology inputs appreciated, Hydrea resumed yesterday  - PBS revelas normocytic anemia without polychormsia, leukocytosis with mature granulocytosis and moderate thrombocytosis  Plan:  - Continue hydroxyurea 500 mg twice daily, for thrombocytosis  - Continue to monitor CBC daily       5.  Hx recurrent DVTs s/p IVC filter  -On chronic coumadin at home  -INR currently subtherapeutic, concerning in light of history of thrombophilia  Plan:  - Continue on Lovenox 120 mg subcutaneous twice daily, in light of subtherapeutic INR  - Continue Coumadin at 5 mg  - Continue to monitor INR 12 hourly     6. Insulin-dependent diabetes melitis, well controlled  - On home Lantus 25 units at home  - >220>229>123>131>141>109  Plan:  - Continue Lantus 7 units and LDSS  - POCT every 6 hours, goal 140-180  - Hypoglycemic protocol     7. DM neuropathy  - On home Gabapentin 300 TID,   Plan:   - Gabapentin resumed 200 mg nightly  -  Topical analgesia ( Balm) added for the pain three times daily     8. History of chronic diarrhea, likely from colectomy  - still complains of 5-6 episodes of loose stool  - Patient on Imodium at home  Plan:  -Resume Imodium 2 mg 4 times as needed     9. Leukocytosis likely reactive from IV steroids  - Patient is hemodynamically stable  - WBC count 12.9>9.0>15.7>14.3  - Infectious work-up essentially negative  Plan:  - Monitor CBC daily     10.  Hx c-spine fusion surgery, 2000         PT/OT evaluation: Not indicated  DVT prophylaxis/ GI prophylaxis: Lovenox and Warfarin   Disposition: continue current care     Francine Clemons MD, PGY-1  Attending physician: Dr. Bhanu Collado

## 2022-10-19 NOTE — PROGRESS NOTES
[address] [facility]  Internal Medicine Residency / [address]    Attending Physician Statement  I have discussed the case, including pertinent history and exam findings with the resident and the team.  I have seen and examined the patient and the key elements of the encounter have been performed by me. I agree with the assessment, plan and orders as documented by the resident. A&O  Up in chair  VS stable   Frequent diarrhea and may be post cholecystectomy vs short Colon  Will try Questran daily  INR 1.3  Warfarin per pharmacy  Remainder of medical problems as per resident note.       Chata Ayers MD [facility]  Internal Medicine Residency Faculty

## 2022-10-20 LAB
ANION GAP SERPL CALCULATED.3IONS-SCNC: 13 MMOL/L (ref 7–16)
BUN BLDV-MCNC: 20 MG/DL (ref 6–20)
CALCIUM SERPL-MCNC: 8.9 MG/DL (ref 8.6–10.2)
CHLORIDE BLD-SCNC: 107 MMOL/L (ref 98–107)
CO2: 19 MMOL/L (ref 22–29)
CREAT SERPL-MCNC: 1.1 MG/DL (ref 0.7–1.2)
ENA TO RNP ANTIBODY: NEGATIVE
GFR SERPL CREATININE-BSD FRML MDRD: >60 ML/MIN/1.73
GLUCOSE BLD-MCNC: 101 MG/DL (ref 74–99)
INR BLD: 1.6
METER GLUCOSE: 107 MG/DL (ref 74–99)
METER GLUCOSE: 140 MG/DL (ref 74–99)
METER GLUCOSE: 144 MG/DL (ref 74–99)
METER GLUCOSE: 92 MG/DL (ref 74–99)
POTASSIUM SERPL-SCNC: 3.8 MMOL/L (ref 3.5–5)
PROTHROMBIN TIME: 17.2 SEC (ref 9.3–12.4)
SODIUM BLD-SCNC: 139 MMOL/L (ref 132–146)

## 2022-10-20 PROCEDURE — 6370000000 HC RX 637 (ALT 250 FOR IP): Performed by: STUDENT IN AN ORGANIZED HEALTH CARE EDUCATION/TRAINING PROGRAM

## 2022-10-20 PROCEDURE — 80048 BASIC METABOLIC PNL TOTAL CA: CPT

## 2022-10-20 PROCEDURE — 82962 GLUCOSE BLOOD TEST: CPT

## 2022-10-20 PROCEDURE — 36415 COLL VENOUS BLD VENIPUNCTURE: CPT

## 2022-10-20 PROCEDURE — 6370000000 HC RX 637 (ALT 250 FOR IP): Performed by: INTERNAL MEDICINE

## 2022-10-20 PROCEDURE — 6360000002 HC RX W HCPCS

## 2022-10-20 PROCEDURE — 6360000002 HC RX W HCPCS: Performed by: INTERNAL MEDICINE

## 2022-10-20 PROCEDURE — 6370000000 HC RX 637 (ALT 250 FOR IP)

## 2022-10-20 PROCEDURE — S5553 INSULIN LONG ACTING 5 U: HCPCS | Performed by: STUDENT IN AN ORGANIZED HEALTH CARE EDUCATION/TRAINING PROGRAM

## 2022-10-20 PROCEDURE — 6370000000 HC RX 637 (ALT 250 FOR IP): Performed by: CHIROPRACTOR

## 2022-10-20 PROCEDURE — 1200000000 HC SEMI PRIVATE

## 2022-10-20 PROCEDURE — 85610 PROTHROMBIN TIME: CPT

## 2022-10-20 PROCEDURE — 2580000003 HC RX 258: Performed by: STUDENT IN AN ORGANIZED HEALTH CARE EDUCATION/TRAINING PROGRAM

## 2022-10-20 PROCEDURE — 99231 SBSQ HOSP IP/OBS SF/LOW 25: CPT | Performed by: INTERNAL MEDICINE

## 2022-10-20 RX ORDER — METOPROLOL SUCCINATE 50 MG/1
50 TABLET, EXTENDED RELEASE ORAL DAILY
Status: DISCONTINUED | OUTPATIENT
Start: 2022-10-21 | End: 2022-10-21 | Stop reason: HOSPADM

## 2022-10-20 RX ORDER — ANALGESIC BALM 1.74; 4.06 G/29G; G/29G
OINTMENT TOPICAL 3 TIMES DAILY PRN
Status: DISCONTINUED | OUTPATIENT
Start: 2022-10-20 | End: 2022-10-20 | Stop reason: SDUPTHER

## 2022-10-20 RX ORDER — KETOROLAC TROMETHAMINE 30 MG/ML
15 INJECTION, SOLUTION INTRAMUSCULAR; INTRAVENOUS ONCE
Status: COMPLETED | OUTPATIENT
Start: 2022-10-20 | End: 2022-10-20

## 2022-10-20 RX ORDER — LIDOCAINE HYDROCHLORIDE 20 MG/ML
JELLY TOPICAL PRN
Status: DISCONTINUED | OUTPATIENT
Start: 2022-10-20 | End: 2022-10-21 | Stop reason: HOSPADM

## 2022-10-20 RX ORDER — METHYL SALICYLATE
OIL (ML) MISCELLANEOUS EVERY 8 HOURS PRN
Status: DISCONTINUED | OUTPATIENT
Start: 2022-10-20 | End: 2022-10-21 | Stop reason: HOSPADM

## 2022-10-20 RX ADMIN — METHYL SALICYLATE: 980 LIQUID TOPICAL at 12:37

## 2022-10-20 RX ADMIN — CHOLESTYRAMINE POWDER FOR SUSPENSION 4 G: 4 POWDER, FOR SUSPENSION ORAL at 09:27

## 2022-10-20 RX ADMIN — HYDROXYUREA 500 MG: 500 CAPSULE ORAL at 20:55

## 2022-10-20 RX ADMIN — ENOXAPARIN SODIUM 120 MG: 150 INJECTION SUBCUTANEOUS at 09:26

## 2022-10-20 RX ADMIN — KETOROLAC TROMETHAMINE 15 MG: 30 INJECTION, SOLUTION INTRAMUSCULAR at 15:04

## 2022-10-20 RX ADMIN — CAMPHOR, MENTHOL: 90; 13 OINTMENT TOPICAL at 09:26

## 2022-10-20 RX ADMIN — FAMOTIDINE 20 MG: 20 TABLET, FILM COATED ORAL at 20:54

## 2022-10-20 RX ADMIN — SODIUM CHLORIDE, PRESERVATIVE FREE 10 ML: 5 INJECTION INTRAVENOUS at 09:27

## 2022-10-20 RX ADMIN — GABAPENTIN 200 MG: 100 CAPSULE ORAL at 20:54

## 2022-10-20 RX ADMIN — ENOXAPARIN SODIUM 120 MG: 150 INJECTION SUBCUTANEOUS at 20:56

## 2022-10-20 RX ADMIN — HYDROXYUREA 500 MG: 500 CAPSULE ORAL at 09:26

## 2022-10-20 RX ADMIN — FAMOTIDINE 20 MG: 20 TABLET, FILM COATED ORAL at 09:26

## 2022-10-20 RX ADMIN — LOPERAMIDE HYDROCHLORIDE 2 MG: 2 CAPSULE ORAL at 09:26

## 2022-10-20 RX ADMIN — WARFARIN SODIUM 5 MG: 5 TABLET ORAL at 18:06

## 2022-10-20 RX ADMIN — METOPROLOL SUCCINATE 25 MG: 25 TABLET, FILM COATED, EXTENDED RELEASE ORAL at 09:26

## 2022-10-20 RX ADMIN — LISINOPRIL 5 MG: 5 TABLET ORAL at 09:26

## 2022-10-20 RX ADMIN — SODIUM CHLORIDE, PRESERVATIVE FREE 10 ML: 5 INJECTION INTRAVENOUS at 20:56

## 2022-10-20 RX ADMIN — INSULIN GLARGINE-YFGN 7 UNITS: 100 INJECTION, SOLUTION SUBCUTANEOUS at 20:55

## 2022-10-20 RX ADMIN — FLUTICASONE PROPIONATE 1 SPRAY: 50 SPRAY, METERED NASAL at 09:26

## 2022-10-20 RX ADMIN — KETOROLAC TROMETHAMINE 15 MG: 30 INJECTION, SOLUTION INTRAMUSCULAR at 22:20

## 2022-10-20 ASSESSMENT — ENCOUNTER SYMPTOMS
COLOR CHANGE: 0
SINUS PAIN: 0
ABDOMINAL PAIN: 1
COUGH: 0
DIARRHEA: 1
TROUBLE SWALLOWING: 0
VOMITING: 0
SHORTNESS OF BREATH: 0
NAUSEA: 0
BACK PAIN: 1

## 2022-10-20 ASSESSMENT — PAIN DESCRIPTION - FREQUENCY
FREQUENCY: CONTINUOUS

## 2022-10-20 ASSESSMENT — PAIN DESCRIPTION - DESCRIPTORS
DESCRIPTORS: ACHING;DISCOMFORT;SHARP
DESCRIPTORS: ACHING;DISCOMFORT;SHARP
DESCRIPTORS: ACHING;DISCOMFORT;SORE

## 2022-10-20 ASSESSMENT — PAIN DESCRIPTION - LOCATION
LOCATION: SHOULDER
LOCATION: SHOULDER

## 2022-10-20 ASSESSMENT — PAIN - FUNCTIONAL ASSESSMENT
PAIN_FUNCTIONAL_ASSESSMENT: ACTIVITIES ARE NOT PREVENTED

## 2022-10-20 ASSESSMENT — PAIN SCALES - GENERAL
PAINLEVEL_OUTOF10: 10
PAINLEVEL_OUTOF10: 4
PAINLEVEL_OUTOF10: 7

## 2022-10-20 ASSESSMENT — PAIN DESCRIPTION - ONSET
ONSET: ON-GOING

## 2022-10-20 ASSESSMENT — PAIN DESCRIPTION - PAIN TYPE
TYPE: ACUTE PAIN

## 2022-10-20 ASSESSMENT — PAIN DESCRIPTION - ORIENTATION
ORIENTATION: RIGHT;LEFT
ORIENTATION: RIGHT;LEFT

## 2022-10-20 NOTE — PROGRESS NOTES
[facility]  Internal Medicine Residency Program  Progress Note - House Team     Patient:  Dot Men 48 y.o. male MRN: [id number]     Date of Service: 10/20/2022     CC: Left neck pain and hoarseness  Overnight events: No significant acute overnight event    Subjective     Patient was seen and examined this morning at bedside in no acute distress. He reports improvement in left neck pain and denies shortness of breathing, dysphagia or odynophagia. Patient is tolerating orally without any issue. He has noticed improvement in the chronic diarrhea with newly prescribed per cholestyramine. He claimed Flonase helped a bit with his sneezing. Patient still complaining of pain existing chronic bilateral shoulder pain, states Toradol helps with the pain unlike other topical medications prescribed. Vital signs stable, BP slightly elevated usually at night. INR still subtherapeutic this morning 9.9>5.6>8.4>3.5>1.8>1.3>1.3>1.6, still on Coumadin 5 mg and Lovenox 120 mg subcutaneous 2 times daily to optimize INR to therapeutic level. We will continue to monitor INR. Methyl salicylate to liquid topical was added for pain control. Glycemic control also were optimized, patient currently on basal insulin Lantus 7 units low-dose sliding scale. Objective     Physical Exam:  Vitals: BP (!) 159/93   Pulse 99   Temp 98.2 °F (36.8 °C) (Oral)   Resp 16   Ht 6' 1\" (1.854 m)   Wt 277 lb 9 oz (125.9 kg)   SpO2 98%   BMI 36.62 kg/m²     I & O - 24hr: No intake/output data recorded. General Appearance: alert, appears stated age, and cooperative  HEENT:  Head: Normocephalic, no lesions, without obvious abnormality.   Neck: supple, symmetrical, trachea midline, lateral neck tenderness resolved  Lung: Wheezing quite improved this morning  Heart: S1, S2 normal.  Tachycardic  Abdomen:  Soft, tenderness to left lumbar area improving  Extremities:  extremities normal, atraumatic, no cyanosis or edema  Musculokeletal: No joint swelling, no muscle tenderness. ROM normal in all joints of extremities.    Neurologic: Mental status: Alert, oriented, thought content appropriate    Pertinent Labs & Imaging Studies     CBC with Differential:    Lab Results   Component Value Date/Time    WBC 12.6 10/19/2022 10:10 AM    RBC 4.22 10/19/2022 10:10 AM    HGB 12.1 10/19/2022 10:10 AM    HCT 38.1 10/19/2022 10:10 AM     10/19/2022 10:10 AM    MCV 90.3 10/19/2022 10:10 AM    MCH 28.7 10/19/2022 10:10 AM    MCHC 31.8 10/19/2022 10:10 AM    RDW 13.7 10/19/2022 10:10 AM    NRBC 0.9 11/19/2020 06:38 AM    SEGSPCT 80 11/26/2013 01:45 PM    LYMPHOPCT 30.1 10/19/2022 10:10 AM    MONOPCT 11.0 10/19/2022 10:10 AM    BASOPCT 0.2 10/19/2022 10:10 AM    MONOSABS 1.39 10/19/2022 10:10 AM    LYMPHSABS 3.80 10/19/2022 10:10 AM    EOSABS 1.13 10/19/2022 10:10 AM    BASOSABS 0.03 10/19/2022 10:10 AM     CMP:    Lab Results   Component Value Date/Time     10/20/2022 04:24 AM    K 3.8 10/20/2022 04:24 AM    K 4.0 10/16/2022 04:04 AM     10/20/2022 04:24 AM    CO2 19 10/20/2022 04:24 AM    BUN 20 10/20/2022 04:24 AM    CREATININE 1.1 10/20/2022 04:24 AM    GFRAA >60 10/17/2022 06:36 AM    LABGLOM >60 10/20/2022 04:24 AM    GLUCOSE 101 10/20/2022 04:24 AM    GLUCOSE 125 05/11/2012 08:42 AM    PROT 7.6 10/16/2022 04:04 AM    LABALBU 4.0 10/16/2022 04:04 AM    LABALBU 4.8 05/11/2012 08:42 AM    CALCIUM 8.9 10/20/2022 04:24 AM    BILITOT 0.4 10/16/2022 04:04 AM    ALKPHOS 116 10/16/2022 04:04 AM    AST 23 10/16/2022 04:04 AM    ALT 27 10/16/2022 04:04 AM     PT/INR:    Lab Results   Component Value Date/Time    PROTIME 17.2 10/20/2022 04:24 AM    PROTIME 35.6 08/19/2022 01:02 PM    INR 1.6 10/20/2022 04:24 AM     Warfarin PT/INR:  No components found for: PTPATWAR, PTINRWAR    CT SOFT TISSUE NECK W CONTRAST   Final Result   Abnormal retropharyngeal fluid collection consistent with a phlegmon   extending from the skull base to the C7 level measuring 1 cm in thickness. Edematous appearance of the epiglottis concerning for acute epiglottitis. There is mild-to-moderate supraglottic airway narrowing. Diffusely edematous appearance of the oropharynx and hypopharynx consistent   with a severe pharyngitis. No drainable abscess identified. Resident's Assessment and Plan     Assessment and Plan:    Epiglottic hematoma 2/2 supratherapeutic INR s/p ENT endoscopy, resolved  CT soft tissue neck 10/14/2022 - showed retropharyngeal fluid collection, s/o phlegmon extending from skull base to C7, 1 cm with acute epiglottitis and acute pharyngitis and mild to moderate supraglottic airway narrowing  Flexible nasopharyngeal endoscopy 10/14/2022 - showed significant amount of secretions with edematous/omega shaped epiglottis and retropharyngeal swelling as per otolaryngology documentation  Patient could be very difficult intubation secondary to epiglottic swelling and could require tracheostomy as per otolaryngology  Repeat flexible nasopharyngeal endoscopy 10/15/2022 - showed airway appears much more favorable for intubation if needed, left-sided epiglottal hematoma still present  Repeat flexible nasopharyngeal endoscopy 10/16/2022 - showed significant improvement and hematoma in terms of size and TVCs visible  Airway currently stable as per otolaryngology, patient transferred out of CCU 10/16/2022  Otolaryngology signed off  Plan:  Continue oral diet as tolerated      Supra therapeutic INR, (9.9) in the setting of chronic Coumadin, now subtherapeutic  S/p TXA, FFP x 2  Repeat INR still 9.9, previous in 3.s at home  INR 9.9>5.6>8.4>3.5>1.8>1.3>1.3>1.6  Coumadin resumed 10/17/2022  Hematology inputs appreciated  Plan:  Continue Lovenox 120 mg subcutaneous twice daily, in light of subtherapeutic INR  Continue Coumadin at 5 mg  Continue to monitor INR daily    4.   Hypertension  On home Toprol XL 25 OD  BP still elevated and slightly tachycardic  Plan:  Continue lisinopril 5 mg p.o. daily  Metoprolol to 50 mg p.o. daily, elevated BP and HR     Normocytic anemia with thrombocytosis in the setting of thrombophilia  Hg 11.9>10.9>11.7, platelets 480>207>745>664  Hematology inputs appreciated, Hydrea resumed yesterday  Anti-RNP negative  Plan:  Continue hydroxyurea 500 mg twice daily, for thrombocytosis  Continue to monitor CBC daily  Follow-up peripheral blood smear     Hx recurrent DVTs s/p IVC filter  On chronic coumadin at home  INR currently subtherapeutic, concerning in light of history of thrombophilia  Plan:  Continue Lovenox 120 mg subcutaneous twice daily, in light of subtherapeutic INR  Continue Coumadin at 5 mg  Continue to monitor INR daily     Insulin-dependent diabetes melitis, well controlled  On home Lantus 25 units at home  >220>229>123>131>141>109>144  Plan:  Continue Lantus 7 units, LDSS  POCT every 6 hours, goal 140-180  Hypoglycemic protocol    DM neuropathy  On home Gabapentin 300 TID,   Plan:   Gabapentin resumed 200 mg nightly    History of chronic diarrhea, likely from colectomy  Patient on Imodium at home  Noted improvement with cholestyramine  Plan:  Continue Cholestyramine 4 g daily  Continue Imodium 2 mg 4 times as needed    Leukocytosis likely reactive from IV steroids  Patient is hemodynamically stable  WBC count 12.9>9.0>15.7>14.3  Infectious work-up essentially negative  Plan:  Monitor CBC daily --------------------------------------------------------------------------------------------------------------------------------------------------  Hx c-spine fusion surgery, 2000       PT/OT evaluation: Not indicated  DVT prophylaxis/ GI prophylaxis: No indication  Disposition: admit to CCU    Ruma Hyde MD, PGY-1  Attending physician: Dr. Hira Teran

## 2022-10-20 NOTE — CARE COORDINATION
Per Dr. Grant Spar note today, INR 1.6. Tolerating Questran and some improvement. A&O. VS stable. C/O shoulder pains. Topical agents for now. Pt lives with his son in a two story home with 3 stairs to enter and one rail. Bed is on basement floor and bath is on second floor with two flights of stairs with one rail to access. He is able to stay on main level with half bath as needed, and son is attempting to finish full bath basement level for increased accessibility. Pt ambulated with FWW prn PTA. Equipment Owned: SEPIDEH, cane, BSC. PT Ampac score 20/24. Cm/Sw is following  for any discharge needs that may arise.   Amanda Osorio RN CM  [phone]

## 2022-10-20 NOTE — PROGRESS NOTES
[address] [facility]  Internal Medicine Residency / [address]    Attending Physician Statement  I have discussed the case, including pertinent history and exam findings with the resident and the team.  I have seen and examined the patient and the key elements of the encounter have been performed by me. I agree with the assessment, plan and orders as documented by the resident. INR 1.6  Tolerating Questran and some improvement  A&O  VS stable   C/O shoulder pains  Topical agents for now   RP consult    Remainder of medical problems as per resident note.       Jazmyne Tellez MD [facility]  Internal Medicine Residency Faculty

## 2022-10-20 NOTE — PROGRESS NOTES
[facility]  Internal Medicine Residency Program  Progress Note - House Team 1    Patient:  Benita Vega 48 y.o. male MRN: [id number]     Date of Service: 10/20/2022     CC: Acute onset L sided neck pain, hoarseness  Overnight events: Patient c/o shoulder pain and was given tylenol first, followed by ibuprofen, and pain still persisted so toradol 15mg 1 time dose was given. Patient was able to sleep at that point and had pain relief, but then asked for more toradol at 6am, which lidocaine gel was given. Subjective     Patient was sitting in a chair bent over but in not acute distress this morning. Patient stated that he was able to sleep 3 hours last night, which is about his normal amount of sleep, after his pain improved, but he is now having more shoulder and back pain. At home the patient states that he uses Hemp Cream for his pain. He stated that the questran improved his stool quality although it eventually returned to being relatively liquid as the day progressed. The patient stated that the Flonase improved his dry sinuses although he still reports sneezing. The patient had no other complaints at this time. Review of Systems   Constitutional:  Negative for appetite change, chills and diaphoresis. HENT:  Positive for sneezing. Negative for sinus pain and trouble swallowing. Respiratory:  Negative for cough and shortness of breath. Cardiovascular:  Negative for chest pain and leg swelling. Gastrointestinal:  Positive for abdominal pain and diarrhea. Negative for nausea and vomiting. Genitourinary:  Negative for frequency and urgency. Musculoskeletal:  Positive for arthralgias (BL shoulder pain) and back pain. Skin:  Negative for color change and pallor. Neurological:  Negative for dizziness, speech difficulty and headaches. Psychiatric/Behavioral:  Negative for confusion and sleep disturbance. The patient is not nervous/anxious.         Objective Physical Exam:  Vitals: BP (!) 159/93   Pulse 99   Temp 98.2 °F (36.8 °C) (Oral)   Resp 16   Ht 6' 1\" (1.854 m)   Wt 277 lb 9 oz (125.9 kg)   SpO2 98%   BMI 36.62 kg/m²     I & O - 24hr: No intake/output data recorded. General Appearance: alert, appears stated age, and cooperative  HEENT:  Head: Normal, normocephalic, atraumatic. Eye: Normal external eye, conjunctiva, lids cornea, IRMA. Neck: no adenopathy, no carotid bruit, no JVD, supple, symmetrical, trachea midline, thyroid not enlarged, symmetric, no tenderness/mass/nodules, and moderate L neck pain  Lung: clear to auscultation bilaterally  Heart: regular rate and rhythm, S1, S2 normal, no murmur, click, rub or gallop  Abdomen: abnormal findings:  tenderness mild near area of medication injection sites  Extremities:  extremities normal, atraumatic, no cyanosis or edema  Musculokeletal: No joint swelling, no muscle tenderness. ROM normal in all joints of extremities.    Neurologic: Mental status: Alert, oriented, thought content appropriate  Subject  Pertinent Labs & Imaging Studies   rafa  CBC:   Lab Results   Component Value Date/Time    WBC 12.6 10/19/2022 10:10 AM    RBC 4.22 10/19/2022 10:10 AM    HGB 12.1 10/19/2022 10:10 AM    HCT 38.1 10/19/2022 10:10 AM    MCV 90.3 10/19/2022 10:10 AM    MCH 28.7 10/19/2022 10:10 AM    MCHC 31.8 10/19/2022 10:10 AM    RDW 13.7 10/19/2022 10:10 AM     10/19/2022 10:10 AM    MPV 9.6 10/19/2022 10:10 AM     Platelets:    Lab Results   Component Value Date/Time     10/19/2022 10:10 AM     BMP:    Lab Results   Component Value Date/Time     10/20/2022 04:24 AM    K 3.8 10/20/2022 04:24 AM    K 4.0 10/16/2022 04:04 AM     10/20/2022 04:24 AM    CO2 19 10/20/2022 04:24 AM    BUN 20 10/20/2022 04:24 AM    LABALBU 4.0 10/16/2022 04:04 AM    LABALBU 4.8 05/11/2012 08:42 AM    CREATININE 1.1 10/20/2022 04:24 AM    CALCIUM 8.9 10/20/2022 04:24 AM    GFRAA >60 10/17/2022 06:36 AM    LABGLOM >60 10/20/2022 04:24 AM    GLUCOSE 101 10/20/2022 04:24 AM    GLUCOSE 125 05/11/2012 08:42 AM     PT/INR:    Lab Results   Component Value Date/Time    PROTIME 17.2 10/20/2022 04:24 AM    PROTIME 35.6 08/19/2022 01:02 PM    INR 1.6 10/20/2022 04:24 AM       Medical Student's Assessment and Plan     1. Epiglottic hematoma secondary to supratherapeutic INR s/p ENT endoscopy r/o angioedema   - continue oral diet as tolerated   - Monitor INR q12hrs  2. Supratherapeutic INR in the setting of chronic warfarin   - Continue Warfarin 5mg and Lovenox bridge 120mg SQ BID   - Monitor INR q12hrs  3. Normocytic anemia and thrombocytosis in the setting of thrombophilia   - Continue Hydrea 500mg BID   - Monitor CBC daily  4. HTN   - Continue metoprolol daily  5. IDDM   - Continue insulin glargine and lipro, monirot blood glucose levels  6.  DM neuropathy   - Continue Gabapentin 200mg    PT/OT evaluation: 20/24  DVT prophylaxis/ GI prophylaxis: Pepcid  Disposition: continue current care    Charmayne Rook, OMS-III  Attending physician: Dr. Venkat Echavarria

## 2022-10-21 ENCOUNTER — TELEPHONE (OUTPATIENT)
Dept: PRIMARY CARE CLINIC | Age: 47
End: 2022-10-21

## 2022-10-21 VITALS
DIASTOLIC BLOOD PRESSURE: 73 MMHG | HEART RATE: 82 BPM | BODY MASS INDEX: 36.22 KG/M2 | SYSTOLIC BLOOD PRESSURE: 134 MMHG | WEIGHT: 273.3 LBS | HEIGHT: 73 IN | OXYGEN SATURATION: 98 % | TEMPERATURE: 98.9 F | RESPIRATION RATE: 18 BRPM

## 2022-10-21 LAB
ANION GAP SERPL CALCULATED.3IONS-SCNC: 13 MMOL/L (ref 7–16)
BASOPHILS ABSOLUTE: 0.04 E9/L (ref 0–0.2)
BASOPHILS RELATIVE PERCENT: 0.3 % (ref 0–2)
BUN BLDV-MCNC: 15 MG/DL (ref 6–20)
CALCIUM SERPL-MCNC: 9.2 MG/DL (ref 8.6–10.2)
CHLORIDE BLD-SCNC: 109 MMOL/L (ref 98–107)
CO2: 18 MMOL/L (ref 22–29)
CREAT SERPL-MCNC: 0.9 MG/DL (ref 0.7–1.2)
EOSINOPHILS ABSOLUTE: 1.53 E9/L (ref 0.05–0.5)
EOSINOPHILS RELATIVE PERCENT: 12.6 % (ref 0–6)
GFR SERPL CREATININE-BSD FRML MDRD: >60 ML/MIN/1.73
GLUCOSE BLD-MCNC: 108 MG/DL (ref 74–99)
HCT VFR BLD CALC: 37.5 % (ref 37–54)
HEMOGLOBIN: 12.6 G/DL (ref 12.5–16.5)
IMMATURE GRANULOCYTES #: 0.04 E9/L
IMMATURE GRANULOCYTES %: 0.3 % (ref 0–5)
INR BLD: 1.7
LYMPHOCYTES ABSOLUTE: 2.4 E9/L (ref 1.5–4)
LYMPHOCYTES RELATIVE PERCENT: 19.7 % (ref 20–42)
MCH RBC QN AUTO: 29.6 PG (ref 26–35)
MCHC RBC AUTO-ENTMCNC: 33.6 % (ref 32–34.5)
MCV RBC AUTO: 88.2 FL (ref 80–99.9)
METER GLUCOSE: 127 MG/DL (ref 74–99)
METER GLUCOSE: 93 MG/DL (ref 74–99)
MONOCYTES ABSOLUTE: 0.95 E9/L (ref 0.1–0.95)
MONOCYTES RELATIVE PERCENT: 7.8 % (ref 2–12)
NEUTROPHILS ABSOLUTE: 7.21 E9/L (ref 1.8–7.3)
NEUTROPHILS RELATIVE PERCENT: 59.3 % (ref 43–80)
PDW BLD-RTO: 13.7 FL (ref 11.5–15)
PLATELET # BLD: 717 E9/L (ref 130–450)
PMV BLD AUTO: 9 FL (ref 7–12)
POTASSIUM SERPL-SCNC: 3.9 MMOL/L (ref 3.5–5)
PROTHROMBIN TIME: 18.5 SEC (ref 9.3–12.4)
RBC # BLD: 4.25 E12/L (ref 3.8–5.8)
SODIUM BLD-SCNC: 140 MMOL/L (ref 132–146)
WBC # BLD: 12.2 E9/L (ref 4.5–11.5)

## 2022-10-21 PROCEDURE — 82962 GLUCOSE BLOOD TEST: CPT

## 2022-10-21 PROCEDURE — 6360000002 HC RX W HCPCS: Performed by: INTERNAL MEDICINE

## 2022-10-21 PROCEDURE — 6370000000 HC RX 637 (ALT 250 FOR IP): Performed by: INTERNAL MEDICINE

## 2022-10-21 PROCEDURE — 99238 HOSP IP/OBS DSCHRG MGMT 30/<: CPT | Performed by: INTERNAL MEDICINE

## 2022-10-21 PROCEDURE — 2580000003 HC RX 258: Performed by: STUDENT IN AN ORGANIZED HEALTH CARE EDUCATION/TRAINING PROGRAM

## 2022-10-21 PROCEDURE — 80048 BASIC METABOLIC PNL TOTAL CA: CPT

## 2022-10-21 PROCEDURE — 85610 PROTHROMBIN TIME: CPT

## 2022-10-21 PROCEDURE — 6370000000 HC RX 637 (ALT 250 FOR IP): Performed by: CHIROPRACTOR

## 2022-10-21 PROCEDURE — 85025 COMPLETE CBC W/AUTO DIFF WBC: CPT

## 2022-10-21 PROCEDURE — 36415 COLL VENOUS BLD VENIPUNCTURE: CPT

## 2022-10-21 RX ORDER — METOPROLOL SUCCINATE 50 MG/1
50 TABLET, EXTENDED RELEASE ORAL DAILY
Qty: 30 TABLET | Refills: 3 | Status: SHIPPED | OUTPATIENT
Start: 2022-10-22

## 2022-10-21 RX ORDER — ONDANSETRON 4 MG/1
4 TABLET, ORALLY DISINTEGRATING ORAL EVERY 8 HOURS PRN
Qty: 1 TABLET | Refills: 0 | Status: CANCELLED | OUTPATIENT
Start: 2022-10-21 | End: 2022-11-04

## 2022-10-21 RX ORDER — WARFARIN SODIUM 7.5 MG/1
7.5 TABLET ORAL DAILY
COMMUNITY
End: 2022-11-18

## 2022-10-21 RX ORDER — WARFARIN SODIUM 7.5 MG/1
TABLET ORAL
Qty: 30 TABLET | Refills: 3 | Status: CANCELLED | OUTPATIENT
Start: 2022-10-21

## 2022-10-21 RX ORDER — INSULIN GLARGINE-YFGN 100 [IU]/ML
7 INJECTION, SOLUTION SUBCUTANEOUS NIGHTLY
Qty: 10 EACH | Refills: 0 | Status: SHIPPED | OUTPATIENT
Start: 2022-10-21

## 2022-10-21 RX ORDER — WARFARIN SODIUM 7.5 MG/1
TABLET ORAL
Qty: 30 TABLET | Refills: 0 | Status: SHIPPED | OUTPATIENT
Start: 2022-10-21 | End: 2022-11-18

## 2022-10-21 RX ORDER — WARFARIN SODIUM 7.5 MG/1
7.5 TABLET ORAL
Status: COMPLETED | OUTPATIENT
Start: 2022-10-21 | End: 2022-10-21

## 2022-10-21 RX ORDER — ENOXAPARIN SODIUM 150 MG/ML
120 INJECTION SUBCUTANEOUS 2 TIMES DAILY
Qty: 8 ML | Refills: 0 | Status: SHIPPED | OUTPATIENT
Start: 2022-10-21 | End: 2022-10-26

## 2022-10-21 RX ORDER — HYDROXYUREA 500 MG/1
500 CAPSULE ORAL 2 TIMES DAILY
Qty: 52 CAPSULE | Refills: 0 | Status: SHIPPED | OUTPATIENT
Start: 2022-10-21 | End: 2022-11-16

## 2022-10-21 RX ORDER — CHOLESTYRAMINE 4 G/9G
1 POWDER, FOR SUSPENSION ORAL DAILY
COMMUNITY

## 2022-10-21 RX ORDER — INSULIN GLARGINE 100 [IU]/ML
7 INJECTION, SOLUTION SUBCUTANEOUS NIGHTLY
COMMUNITY

## 2022-10-21 RX ORDER — CHOLESTYRAMINE 4 G/9G
1 POWDER, FOR SUSPENSION ORAL DAILY
Qty: 90 PACKET | Refills: 3 | Status: SHIPPED | OUTPATIENT
Start: 2022-10-22

## 2022-10-21 RX ADMIN — FAMOTIDINE 20 MG: 20 TABLET, FILM COATED ORAL at 08:43

## 2022-10-21 RX ADMIN — CHOLESTYRAMINE POWDER FOR SUSPENSION 4 G: 4 POWDER, FOR SUSPENSION ORAL at 08:43

## 2022-10-21 RX ADMIN — WARFARIN SODIUM 7.5 MG: 7.5 TABLET ORAL at 13:05

## 2022-10-21 RX ADMIN — LISINOPRIL 5 MG: 5 TABLET ORAL at 08:42

## 2022-10-21 RX ADMIN — METOPROLOL SUCCINATE 50 MG: 50 TABLET, EXTENDED RELEASE ORAL at 08:43

## 2022-10-21 RX ADMIN — SODIUM CHLORIDE, PRESERVATIVE FREE 10 ML: 5 INJECTION INTRAVENOUS at 08:42

## 2022-10-21 RX ADMIN — ENOXAPARIN SODIUM 120 MG: 150 INJECTION SUBCUTANEOUS at 08:42

## 2022-10-21 RX ADMIN — LOPERAMIDE HYDROCHLORIDE 2 MG: 2 CAPSULE ORAL at 06:48

## 2022-10-21 RX ADMIN — HYDROXYUREA 500 MG: 500 CAPSULE ORAL at 08:43

## 2022-10-21 RX ADMIN — FLUTICASONE PROPIONATE 1 SPRAY: 50 SPRAY, METERED NASAL at 08:45

## 2022-10-21 ASSESSMENT — PAIN - FUNCTIONAL ASSESSMENT: PAIN_FUNCTIONAL_ASSESSMENT: ACTIVITIES ARE NOT PREVENTED

## 2022-10-21 ASSESSMENT — PAIN DESCRIPTION - FREQUENCY: FREQUENCY: CONTINUOUS

## 2022-10-21 ASSESSMENT — PAIN DESCRIPTION - LOCATION: LOCATION: SHOULDER

## 2022-10-21 ASSESSMENT — PAIN SCALES - GENERAL: PAINLEVEL_OUTOF10: 6

## 2022-10-21 ASSESSMENT — PAIN DESCRIPTION - ORIENTATION: ORIENTATION: RIGHT;LEFT

## 2022-10-21 ASSESSMENT — PAIN DESCRIPTION - PAIN TYPE: TYPE: ACUTE PAIN

## 2022-10-21 ASSESSMENT — PAIN DESCRIPTION - DESCRIPTORS: DESCRIPTORS: ACHING;DISCOMFORT;SORE

## 2022-10-21 ASSESSMENT — PAIN DESCRIPTION - ONSET: ONSET: ON-GOING

## 2022-10-21 NOTE — CARE COORDINATION
Discharge order noted. Per Dr. Migdalia Erickson note today, Will Discharge on Lovenox bridge And follow INR as outpatient. Per RN patient will need an INR on Monday. Referral made to both [facility] and [place] at [facility]. [facility] is able to accept. Patient notified. Home Health Care orders are in. Patient said his son is transporting him home today.   Herbie Millard RN   [phone]

## 2022-10-21 NOTE — PROGRESS NOTES
Comprehensive Nutrition Assessment    Type and Reason for Visit:  Initial, RD Nutrition Re-Screen/LOS (LOS 7)    Nutrition Recommendations/Plan:   Continue current diet as tolerated. No nutrition intervention indicated at this time. Will continue to monitor. Consult RD as needed. Malnutrition Assessment:  Malnutrition Status: At risk for malnutrition (Comment) (10/21/22 1311)    Context:  Chronic Illness     Findings of the 6 clinical characteristics of malnutrition:  Energy Intake:  No significant decrease in energy intake  Weight Loss:  10% over 6 months (14% x 6mo)     Body Fat Loss:  No significant body fat loss     Muscle Mass Loss:  No significant muscle mass loss    Fluid Accumulation:  No significant fluid accumulation     Strength:  Not Performed    Nutrition Assessment:    Pt. admit for eval of Acute epiglottitis with airway obstruction. Noted Epiglottic hematoma 2/2 supratherapeutic INR s/p ENT endoscopy (resolved). Hx of DM. Pt. tolerating easy to chew diet w/ stable intake (% of most meals) and is w/ no other significant nutritional issues at this time. Will continue to monitor. Nutrition Related Findings:    A&Ox4, +BS, GI WDL, +diarrhea (Hx of chronic- noted likely from colectomy), trace edema, Wound Type: None       Current Nutrition Intake & Therapies:    Average Meal Intake: %  Average Supplements Intake: None Ordered  ADULT DIET; Easy to Chew; 4 carb choices (60 gm/meal)    Anthropometric Measures:  Height: 6' 1\" (185.4 cm)  Ideal Body Weight (IBW): 184 lbs (84 kg)    Admission Body Weight: 281 lb (127.5 kg) (first measured 10/17 BS)  Current Body Weight: 273 lb 4.8 oz (124 kg) (10/21 BS), 148.5 % IBW.  Weight Source: Bed Scale  Current BMI (kg/m2): 36.1  Usual Body Weight: 318 lb 5 oz (144.4 kg) (5/15/22)  % Weight Change (Calculated): -14.1  Weight Adjustment For: No Adjustment                 BMI Categories: Obese Class 2 (BMI 35.0 -39.9)      Nutrition Diagnosis:   No nutrition diagnosis at this time    Nutrition Interventions:   Food and/or Nutrient Delivery: Continue Current Diet  Nutrition Education/Counseling: Education not indicated  Coordination of Nutrition Care: Continue to monitor while inpatient       Goals:     Goals: PO intake 75% or greater, by next RD assessment (to continue)       Nutrition Monitoring and Evaluation:   Behavioral-Environmental Outcomes: None Identified  Food/Nutrient Intake Outcomes: Food and Nutrient Intake  Physical Signs/Symptoms Outcomes: Biochemical Data, Chewing or Swallowing, Diarrhea, Fluid Status or Edema, Nutrition Focused Physical Findings, Skin, Weight, GI Status    Discharge Planning:    No discharge needs at this time     Nandini Verdin RD  Contact: ext [phone]

## 2022-10-21 NOTE — PROGRESS NOTES
[facility]  Internal Medicine Residency / [address]    Attending Physician Statement  I have discussed the case, including pertinent history and exam findings with the resident and the team.  I have seen and examined the patient and the key elements of the encounter have been performed by me. I agree with the assessment, plan and orders as documented by the resident. INR 1.7  Otherwise stable   Will Discharge on Lovenox bridge   And follow INR as outpatient  Balms a failure with shoulder pain  Will attempt low dose of Ibuprofen with warfarin    Remainder of medical problems as per resident note.       Gail Duarte MD [facility]  Internal Medicine Residency Faculty

## 2022-10-21 NOTE — PROGRESS NOTES
All discharge instructions reviewed with patient at bedside. Patient verbalizes understanding of all medications and importance of follow up appointments. Patient's IV removed. Patient understands how to give himself lovenox injections as he has done them at home previously.

## 2022-10-21 NOTE — DISCHARGE SUMMARY
[address]  Discharge Summary    PCP: Jerel Oakes [place]    Admit HMGB:[id number]  Discharge Date: 10/21/2022    Admission Diagnosis:   Acute epiglottitis s/p ENT endoscopy in ED, ? Angioedema vs infectious/viral  Leukocytosis likely reactive vs infection   Supra therapeutic INR, (9.9) in the setting of chronic Coumadin  Normocytic anemia with thrombocytosis r/o hemoconcentration  Hx recurrent DVTs  Insulin-dependent diabetes melitis, well controlled  HTN, stable  DM neuropathy, on home Gabapentin 300 TID  Hx c-spine fusion surgery, 2000    Discharge Diagnosis:  Spontaneous epiglottic hematoma 2/2 supratherapeutic INR s/p ENT endoscopy, resolved  Supra therapeutic INR, (9.9) in the setting of chronic Coumadin, now subtherapeutic  Hypertension, controlled  Normocytic anemia with thrombocytosis in the setting of thrombophilia, Stable  Hx recurrent DVTs s/p IVC filter  Insulin-dependent diabetes melitis, well controlled  DM neuropathy, stable  History of chronic diarrhea, likely from colectomy, improving  Hx c-spine fusion surgery, in 2000  Hx multiple shoulder surgeries with stents in the past    Hospital Course:   MR Murtaza Donnelly is a 48 y.o. male with a past medical history of tPMHx of HTN, Insulin dependent DM, multiple episodes of DVTs, proximal left leg, s.p insertion IVC filter 9/2021 on chronic anticoagulation with warfarin who presented to the ED on 10/14/2022 complaining of acute onset L sided neck pain, hoarseness of voice since 1 day prior. Mr. Mei Reyes was in his usual state of health on he started noticing pain in his left neck which he described was insidious in onset and has progressively worsened since 10/13/2022. Patient reports he was resting at home when he noticed symptoms. Neck pain is described as radiating from the left ear to the left neck and was sounds with movement and touch, no known relieving factor.  Patient said to be tolerating his secretions but with odynophagia. Patient had history of C-spine fusion neck surgery in 2000. He denies any recent upper respiratory tract infection, reflux or indigestion but usually have seasonal allergies which has been well controlled. There was associated hoarseness of voice which has worsened as well. Patient also has associated dysphagia and dyspnea which got worse today necessitating his presentation for evaluation. No associated history of neck swelling and patient denies any family history of angioedema. He denies any fever, chills, cough, sneezing, rhinorrhea, congestion, fatigue or recent weight loss. Denies any new allergic exposures or previous history of similar complaints. At the ED, patient was found to be hemodynamically stable saturating 95% on room air. Initial laboratory work-up showed mild leukocytosis WBC count 12.9 with Hb 11.6, supratherapeutic INR 9.9 and elevated . 3. CT soft tissue neck 10/14/2022 - showed retropharyngeal fluid collection, s/o phlegmon extending from skull base to C7, 1 cm with acute epiglottitis and acute pharyngitis and mild to moderate supraglottic airway narrowing. Patient was given stat doses of Iopamidol, dexamethasone 10 mg, unasyn, Benadryl with famotidine, tranexamic acid and was transfused with 2 unit fresh frozen plasma. ENT was consulted for review. Flexible nasopharyngeal endoscopy done on 10/14/2022 showed significant amount of secretions with edematous/omega shaped epiglottis and retropharyngeal swelling as per otolaryngology documentation. Patient was admitted to MICU for closer observation as he could be very difficult intubation secondary to epiglottic swelling and could require tracheostomy. Repeat flexible nasopharyngeal endoscopy on 10/15/2022 showed airway appears much more favorable for intubation if needed, left-sided epiglottal hematoma still present.  A third  flexible nasopharyngeal endoscopy on 10/16/2022 showed significant improvement and hematoma in terms of size and TVCs visible and airway was deemed stable as per otolaryngology, patient transferred out of CCU 10/16/2022 and Otolaryngology signed off. Patient continues to improve with vitals stable and metoprolol added 50 mg to improve blood pressure control. Hematology was consulted for concerns of thrombophilia and thrombocytosis, anti-RNP came out negative with negative peripheral blood smear and hydroxyurea was resumed at 500 mg twice daily. Optimize glycemic control was achieved on Lantus 7 units with low-dose sliding scale insulin in the course of this admission. Patient also complained of persistent bilateral shoulder pains in a background history of multiple shoulder surgeries and was consistently requesting for Toradol for pain relief. Patient was encouraged to use ibuprofen over-the-counter 200 mg as needed for pain control and to see orthopedics for further inputs. INR became subtherapeutic following oral vit K administration and it trended as thus INR 9.9>5.6>8.4>3.5>1.8>1.3>1.3>1.6>1.7. Coumadin 5 mg was resumed on 10/17/2022 with Lovenox bridge 120 mg subcutaneous twice daily with INR showing very slow improvement. Decision was made after rounds today to discharge patient home on Coumadin 7.5 mg with Lovenox bridge 120 mg subcutaneous twice daily for 5 days. Patient to follow-up with his PCP and hematology within 7 days of discharge and obtain INR on Monday. He verbalized understanding of all instructions and henceforth discharged. Significant findings (history and exam, laboratory, radiological, pathology, other tests):   General Appearance: alert, appears stated age, and cooperative  HEENT:  Head: Normocephalic, no lesions, without obvious abnormality.   Neck: supple, symmetrical, trachea midline  Lung: clear to auscultation bilaterally  Heart: regular rate and rhythm, S1, S2 normal, no murmur, click, rub or gallop  Abdomen: soft, non-tender; bowel sounds normal; no masses,  no organomegaly  Extremities:  extremities normal, atraumatic, no cyanosis or edema  Musculokeletal: No joint swelling, no muscle tenderness. ROM normal in all joints of extremities. Neurologic: Mental status: Alert, oriented, thought content appropriate    Pending test results: -    Consults:  Hematology oncology  Otolaryngology  Critical care    Procedures:  Flexible nasopharyngeal endoscopy x3    Condition at discharge: Stable    Disposition: home    Discharge Medications:  Current Discharge Medication List        START taking these medications    Details   enoxaparin (LOVENOX) 120 MG/0.8ML injection Inject 0.8 mLs into the skin 2 times daily for 5 days  Qty: 8 mL, Refills: 0      cholestyramine (QUESTRAN) 4 g packet Take 1 packet by mouth daily  Qty: 90 packet, Refills: 3      hydroxyurea (HYDREA) 500 MG chemo capsule Take 1 capsule by mouth 2 times daily for 52 doses  Qty: 52 capsule, Refills: 0      insulin glargine-yfgn (SEMGLEE-YFGN) 100 UNIT/ML injection vial Inject 7 Units into the skin nightly  Qty: 10 each, Refills: 0           CONTINUE these medications which have CHANGED    Details   metoprolol succinate (TOPROL XL) 50 MG extended release tablet Take 1 tablet by mouth daily  Qty: 30 tablet, Refills: 3      warfarin (COUMADIN) 7.5 MG tablet TAKE 7.5 MG TABLET DAILY, CHECK INR ON Monday, 10/24/2022  PLEASE TAKE ALONE  AT LEAST 6 HOURS AWAY FROM CHOLESTYRAMINE!   Qty: 30 tablet, Refills: 0           CONTINUE these medications which have NOT CHANGED    Details   cyclobenzaprine (FLEXERIL) 10 MG tablet Take 10 mg by mouth 3 times daily as needed for Muscle spasms      fluticasone (FLONASE) 50 MCG/ACT nasal spray 1 spray by Each Nostril route nightly  Qty: 16 g, Refills: 2    Associated Diagnoses: Seasonal allergies      lisinopril (PRINIVIL;ZESTRIL) 5 MG tablet take 1 tablet by mouth once daily  Qty: 90 tablet, Refills: 1    Associated Diagnoses: Essential hypertension loperamide (IMODIUM) 2 MG capsule take 1 capsule by mouth four times a day if needed for diarrhea  Qty: 120 capsule, Refills: 1    Associated Diagnoses: Chronic diarrhea      Continuous Blood Gluc Sensor (FREESTYLE JOANIE 14 DAY SENSOR) MISC 1 Device by Does not apply route continuous  Qty: 4 each, Refills: 2    Associated Diagnoses: Type 2 diabetes mellitus without complication, with long-term current use of insulin ([facility])      gabapentin (NEURONTIN) 300 MG capsule Take 1 capsule by mouth 3 times daily for 180 days. Intended supply: 30 days  Qty: 90 capsule, Refills: 2    Associated Diagnoses: Peripheral polyneuropathy      aspirin 81 MG chewable tablet Take 1 tablet by mouth daily  Qty: 30 tablet, Refills: 3      Insulin Syringe-Needle U-100 30G X 5/16\" 1 ML MISC 1 each by Does not apply route daily Use as directed  Qty: 100 each, Refills: 5    Associated Diagnoses: Type 2 diabetes mellitus with hyperglycemia, without long-term current use of insulin ([facility])      Continuous Blood Gluc  (FREESTYLE JOANIE READER) SAMARIA 1 Device by Does not apply route continuous  Qty: 1 each, Refills: 0    Associated Diagnoses: Type 2 diabetes mellitus with hyperglycemia, without long-term current use of insulin ([facility])           STOP taking these medications       insulin glargine (LANTUS) 100 UNIT/ML injection vial Comments:   Reason for Stopping:         sennosides-docusate sodium (SENOKOT-S) 8.6-50 MG tablet Comments:   Reason for Stopping:               Activity: activity as tolerated  Diet: diabetic diet    Follow-up appointments:   Future Appointments   Date Time Provider Elisaebth Miller   11/23/2022 12:30 PM MOMO [facility]     Patient Instructions:    The following changes where made to your medications:  Stop taking Sennosides docusate, can was on your diarrhea  Your insulin glargine was decreased to 7 units nightly, continue on 7 units until you see your PCP within 7 days and ensure to check your blood sugar at least daily  Start taking metoprolol at 50 mg daily  Start taking cholestyramine 1 pack daily as needed, do not take with warfarin take 6 hours apart  Continue lovenox 120 mg twice daily for next 5 days, then stop  Continue warfarin at 7.5 mg daily, please take at least 6 hours apart from course planning  Check INR on Monday, 10/24/2022  May take ibuprofen 200 mg tablet over-the-counter for your shoulder pain  Follow-up with your PCP within 7 days of discharge  Follow-up with hematology oncology within 2 weeks of discharge  Continue other medications as directed      Dusty Fitzgerald MD  PGY 1   1:11 PM 10/21/2022

## 2022-10-21 NOTE — PROGRESS NOTES
Internal resident notified of patient request for Toradol for his shoulder pain. Awaiting for new orders.

## 2022-10-21 NOTE — PLAN OF CARE

## 2022-10-21 NOTE — DISCHARGE INSTRUCTIONS
Internal medicine    Follow ups  Please follow up with your primary care physician ( [email or web address]) within 10 days of discharge from hospital. Please call as soon as possible to make an appointment. Please contact the internal medicine clinic for an appointment if you are unable to get an appointment with your PCP. Please keep all other follow up appointments:  Future Appointments   Date Time Provider Elisabeth Angela   11/23/2022 12:30 PM Bo Roque PA-C [facility]     Changes in healthcare   The following changes where made to your medications:  Stop taking Sennosides docusate, can was on your diarrhea  Your insulin glargine was decreased to 7 units nightly, continue on 7 units until you see your PCP within 7 days and ensure to check your blood sugar at least daily  Start taking metoprolol at 50 mg daily  Start taking cholestyramine 1 pack daily as needed, do not take with warfarin take 6 hours apart  Continue lovenox 120 mg twice daily for next 5 days, then stop  Continue warfarin at 7.5 mg daily, please take at least 6 hours apart from course planning  Check INR on Monday, 10/24/2022  May take ibuprofen 200 mg tablet over-the-counter for your shoulder pain  Follow-up with your PCP within 7 days of discharge  Follow-up with hematology oncology within 2 weeks of discharge  Continue other medications as directed    Please take all medications as indicated  Diet: diabetic diet   Activity: activity as tolerated  Should you have further questions in regards to this visit, you can review your clinical note and after visit summary document on your [employer] account. Other questions can be directed to our nurse line at [phone]. Other than any new prescriptions given to you today, the list of home medications on this After Visit Summary are based on information provided to us from you and your healthcare providers.  This information, including the list, dose, and frequency of medications is only as accurate as the information you provided. If you have any questions or concerns about your home medications, please contact your Primary Care Physician for further clarification.

## 2022-10-21 NOTE — TELEPHONE ENCOUNTER
Belinda from [facility] called in would like you to follow for skilled nursing, for PT and INR     [facility]   [phone]  Kayy Cesar personal   [phone]

## 2022-10-23 DIAGNOSIS — I10 ESSENTIAL HYPERTENSION: ICD-10-CM

## 2022-10-24 ENCOUNTER — TELEPHONE (OUTPATIENT)
Dept: PRIMARY CARE CLINIC | Age: 47
End: 2022-10-24

## 2022-10-24 NOTE — TELEPHONE ENCOUNTER
[facility] Initial Follow Up Call    Outreach made within 2 business days of discharge: Yes    Patient: Lary Crockett II Patient : 1975   MRN: [id number]  Reason for Admission: Acute epiglottitis with airway obstruction   Discharge Date: 10/21/22       Spoke with: Unable to leave message - mailbox full     Discharge department/facility: [facility]       Scheduled appointment with PCP within 7-14 days    Follow Up  Future Appointments   Date Time Provider Elisabeth Miller   2022 12:30 PM [facility]       Cierra Kingston

## 2022-10-25 ENCOUNTER — TELEPHONE (OUTPATIENT)
Dept: PRIMARY CARE CLINIC | Age: 47
End: 2022-10-25

## 2022-10-25 ENCOUNTER — ANTI-COAG VISIT (OUTPATIENT)
Dept: PRIMARY CARE CLINIC | Age: 47
End: 2022-10-25
Payer: MEDICARE

## 2022-10-25 DIAGNOSIS — Z79.01 ANTICOAGULATED: Primary | ICD-10-CM

## 2022-10-25 LAB
INR BLD: 3.2
INR BLD: 3.2
PROTIME: NORMAL

## 2022-10-25 PROCEDURE — 93793 ANTICOAG MGMT PT WARFARIN: CPT | Performed by: PHYSICIAN ASSISTANT

## 2022-10-25 RX ORDER — METOPROLOL SUCCINATE 25 MG/1
TABLET, EXTENDED RELEASE ORAL
Qty: 30 TABLET | Refills: 2 | OUTPATIENT
Start: 2022-10-25

## 2022-10-25 NOTE — TELEPHONE ENCOUNTER
[facility] Transitions Initial Follow Up Call    Outreach made within 2 business days of discharge: Yes    Patient: Eva Lou II Patient : 1975   MRN: [id number]  Reason for Admission: Acute epiglottitis with airway obstruction  Discharge Date: 10/21/22       Spoke with: 2nd attempt unable to speak with patient or leave message. Mailbox full.     Discharge department/facility: [facility]    Scheduled appointment with PCP within 7-14 days    Follow Up  Future Appointments   Date Time Provider Elisabeth Miller   2022 12:30 PM Brandt Mohan PA-C TRAIL  [address] [place], [place]

## 2022-10-26 NOTE — PROGRESS NOTES
3.2
Per [facility] patient taking 7.5mg daily. Please advise and next INR draw.
Pt nurse (russell ) notified
Patient expressed no known problems or needs

## 2022-11-01 ENCOUNTER — ANTI-COAG VISIT (OUTPATIENT)
Dept: PRIMARY CARE CLINIC | Age: 47
End: 2022-11-01
Payer: MEDICARE

## 2022-11-01 ENCOUNTER — TELEPHONE (OUTPATIENT)
Dept: PRIMARY CARE CLINIC | Age: 47
End: 2022-11-01

## 2022-11-01 DIAGNOSIS — Z79.01 ANTICOAGULATED: Primary | ICD-10-CM

## 2022-11-01 LAB — INR BLD: 3.6

## 2022-11-01 PROCEDURE — 93793 ANTICOAG MGMT PT WARFARIN: CPT | Performed by: PHYSICIAN ASSISTANT

## 2022-11-02 DIAGNOSIS — G62.9 PERIPHERAL POLYNEUROPATHY: ICD-10-CM

## 2022-11-02 RX ORDER — GABAPENTIN 300 MG/1
300 CAPSULE ORAL 3 TIMES DAILY
Qty: 90 CAPSULE | Refills: 2 | Status: SHIPPED | OUTPATIENT
Start: 2022-11-02 | End: 2023-05-01

## 2022-11-09 ENCOUNTER — TELEPHONE (OUTPATIENT)
Dept: PRIMARY CARE CLINIC | Age: 47
End: 2022-11-09

## 2022-11-09 NOTE — TELEPHONE ENCOUNTER
Kaylin Benitez from [employer] [facility] called to just let you know patient fell last week and had no injuries. They have to notify provider.

## 2022-11-15 ENCOUNTER — ANTI-COAG VISIT (OUTPATIENT)
Dept: PRIMARY CARE CLINIC | Age: 47
End: 2022-11-15

## 2022-11-15 DIAGNOSIS — R79.1 PROLONGED INR: Primary | ICD-10-CM

## 2022-11-15 LAB — INR BLD: 5.5

## 2022-11-15 PROCEDURE — 99999 PR OFFICE/OUTPT VISIT,PROCEDURE ONLY: CPT | Performed by: PHYSICIAN ASSISTANT

## 2022-11-17 ENCOUNTER — ANTI-COAG VISIT (OUTPATIENT)
Dept: PRIMARY CARE CLINIC | Age: 47
End: 2022-11-17
Payer: MEDICARE

## 2022-11-17 DIAGNOSIS — Z79.01 ANTICOAGULATED: Primary | ICD-10-CM

## 2022-11-17 LAB — INR BLD: 1.8

## 2022-11-17 PROCEDURE — 93793 ANTICOAG MGMT PT WARFARIN: CPT | Performed by: PHYSICIAN ASSISTANT

## 2022-11-18 ENCOUNTER — ANTI-COAG VISIT (OUTPATIENT)
Dept: PRIMARY CARE CLINIC | Age: 47
End: 2022-11-18

## 2022-11-18 DIAGNOSIS — Z86.718 HISTORY OF DEEP VEIN THROMBOSIS: ICD-10-CM

## 2022-11-18 RX ORDER — WARFARIN SODIUM 5 MG/1
5 TABLET ORAL DAILY
Qty: 30 TABLET | Refills: 1 | Status: SHIPPED | OUTPATIENT
Start: 2022-11-18

## 2022-11-18 RX ORDER — WARFARIN SODIUM 5 MG/1
TABLET ORAL
Qty: 30 TABLET | Refills: 1 | Status: SHIPPED
Start: 2022-11-18 | End: 2022-11-18 | Stop reason: SDUPTHER

## 2022-11-29 ENCOUNTER — ANTI-COAG VISIT (OUTPATIENT)
Dept: PRIMARY CARE CLINIC | Age: 47
End: 2022-11-29

## 2022-11-29 LAB — INR BLD: 3.2

## 2023-01-07 DIAGNOSIS — J30.2 SEASONAL ALLERGIES: ICD-10-CM

## 2023-01-09 RX ORDER — FLUTICASONE PROPIONATE 50 MCG
SPRAY, SUSPENSION (ML) NASAL
Qty: 16 G | Refills: 2 | Status: SHIPPED | OUTPATIENT
Start: 2023-01-09

## 2023-02-04 ENCOUNTER — HOSPITAL ENCOUNTER (INPATIENT)
Age: 48
LOS: 3 days | Discharge: HOME HEALTH | DRG: 469 | End: 2023-02-07
Attending: STUDENT IN AN ORGANIZED HEALTH CARE EDUCATION/TRAINING PROGRAM | Admitting: INTERNAL MEDICINE
Payer: MEDICAID

## 2023-02-04 ENCOUNTER — APPOINTMENT (OUTPATIENT)
Dept: GENERAL RADIOLOGY | Age: 48
DRG: 469 | End: 2023-02-04
Payer: MEDICAID

## 2023-02-04 ENCOUNTER — APPOINTMENT (OUTPATIENT)
Dept: CT IMAGING | Age: 48
DRG: 469 | End: 2023-02-04
Payer: MEDICAID

## 2023-02-04 DIAGNOSIS — R41.82 ALTERED MENTAL STATUS, UNSPECIFIED ALTERED MENTAL STATUS TYPE: ICD-10-CM

## 2023-02-04 DIAGNOSIS — R79.1 SUPRATHERAPEUTIC INR: ICD-10-CM

## 2023-02-04 DIAGNOSIS — S30.1XXA ABDOMINAL HEMATOMA: ICD-10-CM

## 2023-02-04 DIAGNOSIS — N17.9 ACUTE RENAL FAILURE, UNSPECIFIED ACUTE RENAL FAILURE TYPE (HCC): Primary | ICD-10-CM

## 2023-02-04 LAB
ACANTHOCYTES: ABNORMAL
ACETAMINOPHEN LEVEL: <5 MCG/ML (ref 10–30)
ALBUMIN SERPL-MCNC: 4.2 G/DL (ref 3.5–5.2)
ALP BLD-CCNC: 106 U/L (ref 40–129)
ALT SERPL-CCNC: 37 U/L (ref 0–40)
AMMONIA: 22 UMOL/L (ref 16–60)
AMPHETAMINE SCREEN, URINE: NOT DETECTED
ANION GAP SERPL CALCULATED.3IONS-SCNC: 15 MMOL/L (ref 7–16)
ANISOCYTOSIS: ABNORMAL
AST SERPL-CCNC: 55 U/L (ref 0–39)
BACTERIA: ABNORMAL /HPF
BARBITURATE SCREEN URINE: NOT DETECTED
BASOPHILS ABSOLUTE: 0 E9/L (ref 0–0.2)
BASOPHILS RELATIVE PERCENT: 0.8 % (ref 0–2)
BENZODIAZEPINE SCREEN, URINE: NOT DETECTED
BILIRUB SERPL-MCNC: <0.2 MG/DL (ref 0–1.2)
BILIRUBIN URINE: NEGATIVE
BLOOD, URINE: ABNORMAL
BUN BLDV-MCNC: 59 MG/DL (ref 6–20)
BURR CELLS: ABNORMAL
CALCIUM SERPL-MCNC: 8.9 MG/DL (ref 8.6–10.2)
CANNABINOID SCREEN URINE: NOT DETECTED
CHLORIDE BLD-SCNC: 101 MMOL/L (ref 98–107)
CLARITY: CLEAR
CO2: 19 MMOL/L (ref 22–29)
COCAINE METABOLITE SCREEN URINE: NOT DETECTED
COLOR: YELLOW
CREAT SERPL-MCNC: 3.9 MG/DL (ref 0.7–1.2)
EOSINOPHILS ABSOLUTE: 0.1 E9/L (ref 0.05–0.5)
EOSINOPHILS RELATIVE PERCENT: 0.9 % (ref 0–6)
ETHANOL: <10 MG/DL (ref 0–0.08)
FENTANYL SCREEN, URINE: POSITIVE
GFR SERPL CREATININE-BSD FRML MDRD: 18 ML/MIN/1.73
GLUCOSE BLD-MCNC: 162 MG/DL (ref 74–99)
GLUCOSE URINE: NEGATIVE MG/DL
HCT VFR BLD CALC: 32.2 % (ref 37–54)
HEMOGLOBIN: 10.8 G/DL (ref 12.5–16.5)
HYALINE CASTS: ABNORMAL /LPF (ref 0–2)
HYPERSEGMENTED NEUTROPHILS: ABNORMAL
INFLUENZA A BY PCR: NOT DETECTED
INFLUENZA B BY PCR: NOT DETECTED
INR BLD: 6.1
KETONES, URINE: NEGATIVE MG/DL
LACTIC ACID, SEPSIS: 1.1 MMOL/L (ref 0.5–1.9)
LEUKOCYTE ESTERASE, URINE: NEGATIVE
LYMPHOCYTES ABSOLUTE: 1.13 E9/L (ref 1.5–4)
LYMPHOCYTES RELATIVE PERCENT: 9.5 % (ref 20–42)
Lab: ABNORMAL
MCH RBC QN AUTO: 32.1 PG (ref 26–35)
MCHC RBC AUTO-ENTMCNC: 33.5 % (ref 32–34.5)
MCV RBC AUTO: 95.8 FL (ref 80–99.9)
METHADONE SCREEN, URINE: NOT DETECTED
MONOCYTES ABSOLUTE: 1.36 E9/L (ref 0.1–0.95)
MONOCYTES RELATIVE PERCENT: 12.2 % (ref 2–12)
NEUTROPHILS ABSOLUTE: 8.7 E9/L (ref 1.8–7.3)
NEUTROPHILS RELATIVE PERCENT: 77.4 % (ref 43–80)
NITRITE, URINE: NEGATIVE
OPIATE SCREEN URINE: NOT DETECTED
OVALOCYTES: ABNORMAL
OXYCODONE URINE: NOT DETECTED
PDW BLD-RTO: 21.2 FL (ref 11.5–15)
PH UA: 5.5 (ref 5–9)
PHENCYCLIDINE SCREEN URINE: NOT DETECTED
PLATELET # BLD: 356 E9/L (ref 130–450)
PMV BLD AUTO: 9.2 FL (ref 7–12)
POIKILOCYTES: ABNORMAL
POLYCHROMASIA: ABNORMAL
POTASSIUM REFLEX MAGNESIUM: 5.3 MMOL/L (ref 3.5–5)
PRO-BNP: 1329 PG/ML (ref 0–125)
PROTEIN UA: 30 MG/DL
PROTHROMBIN TIME: 65.8 SEC (ref 9.3–12.4)
RBC # BLD: 3.36 E12/L (ref 3.8–5.8)
RBC UA: ABNORMAL /HPF (ref 0–2)
SALICYLATE, SERUM: <0.3 MG/DL (ref 0–30)
SARS-COV-2, NAAT: NOT DETECTED
SCHISTOCYTES: ABNORMAL
SODIUM BLD-SCNC: 135 MMOL/L (ref 132–146)
SPECIFIC GRAVITY UA: >=1.03 (ref 1–1.03)
TARGET CELLS: ABNORMAL
TOTAL PROTEIN: 6.9 G/DL (ref 6.4–8.3)
TRICYCLIC ANTIDEPRESSANTS SCREEN SERUM: NEGATIVE NG/ML
TROPONIN, HIGH SENSITIVITY: 85 NG/L (ref 0–11)
UROBILINOGEN, URINE: 0.2 E.U./DL
WBC # BLD: 11.3 E9/L (ref 4.5–11.5)
WBC UA: ABNORMAL /HPF (ref 0–5)

## 2023-02-04 PROCEDURE — 70450 CT HEAD/BRAIN W/O DYE: CPT

## 2023-02-04 PROCEDURE — 83880 ASSAY OF NATRIURETIC PEPTIDE: CPT

## 2023-02-04 PROCEDURE — 84484 ASSAY OF TROPONIN QUANT: CPT

## 2023-02-04 PROCEDURE — 71045 X-RAY EXAM CHEST 1 VIEW: CPT

## 2023-02-04 PROCEDURE — 81001 URINALYSIS AUTO W/SCOPE: CPT

## 2023-02-04 PROCEDURE — 87502 INFLUENZA DNA AMP PROBE: CPT

## 2023-02-04 PROCEDURE — 80179 DRUG ASSAY SALICYLATE: CPT

## 2023-02-04 PROCEDURE — 80053 COMPREHEN METABOLIC PANEL: CPT

## 2023-02-04 PROCEDURE — 83605 ASSAY OF LACTIC ACID: CPT

## 2023-02-04 PROCEDURE — 80143 DRUG ASSAY ACETAMINOPHEN: CPT

## 2023-02-04 PROCEDURE — 85610 PROTHROMBIN TIME: CPT

## 2023-02-04 PROCEDURE — 93005 ELECTROCARDIOGRAM TRACING: CPT | Performed by: STUDENT IN AN ORGANIZED HEALTH CARE EDUCATION/TRAINING PROGRAM

## 2023-02-04 PROCEDURE — 82077 ASSAY SPEC XCP UR&BREATH IA: CPT

## 2023-02-04 PROCEDURE — 80307 DRUG TEST PRSMV CHEM ANLYZR: CPT

## 2023-02-04 PROCEDURE — 2060000000 HC ICU INTERMEDIATE R&B

## 2023-02-04 PROCEDURE — 82140 ASSAY OF AMMONIA: CPT

## 2023-02-04 PROCEDURE — 99285 EMERGENCY DEPT VISIT HI MDM: CPT

## 2023-02-04 PROCEDURE — 87635 SARS-COV-2 COVID-19 AMP PRB: CPT

## 2023-02-04 PROCEDURE — 85025 COMPLETE CBC W/AUTO DIFF WBC: CPT

## 2023-02-04 PROCEDURE — 36415 COLL VENOUS BLD VENIPUNCTURE: CPT

## 2023-02-04 PROCEDURE — 74176 CT ABD & PELVIS W/O CONTRAST: CPT

## 2023-02-04 RX ORDER — METOPROLOL SUCCINATE 50 MG/1
50 TABLET, EXTENDED RELEASE ORAL DAILY
Status: DISCONTINUED | OUTPATIENT
Start: 2023-02-05 | End: 2023-02-07 | Stop reason: HOSPADM

## 2023-02-04 RX ORDER — SODIUM CHLORIDE 0.9 % (FLUSH) 0.9 %
10 SYRINGE (ML) INJECTION PRN
Status: DISCONTINUED | OUTPATIENT
Start: 2023-02-04 | End: 2023-02-07 | Stop reason: HOSPADM

## 2023-02-04 RX ORDER — ONDANSETRON 4 MG/1
4 TABLET, ORALLY DISINTEGRATING ORAL EVERY 8 HOURS PRN
Status: DISCONTINUED | OUTPATIENT
Start: 2023-02-04 | End: 2023-02-07 | Stop reason: HOSPADM

## 2023-02-04 RX ORDER — FLUTICASONE PROPIONATE 50 MCG
1 SPRAY, SUSPENSION (ML) NASAL DAILY
Status: DISCONTINUED | OUTPATIENT
Start: 2023-02-05 | End: 2023-02-07 | Stop reason: HOSPADM

## 2023-02-04 RX ORDER — INSULIN LISPRO 100 [IU]/ML
0-4 INJECTION, SOLUTION INTRAVENOUS; SUBCUTANEOUS
Status: DISCONTINUED | OUTPATIENT
Start: 2023-02-05 | End: 2023-02-07 | Stop reason: HOSPADM

## 2023-02-04 RX ORDER — SODIUM CHLORIDE 0.9 % (FLUSH) 0.9 %
10 SYRINGE (ML) INJECTION EVERY 12 HOURS SCHEDULED
Status: DISCONTINUED | OUTPATIENT
Start: 2023-02-05 | End: 2023-02-07 | Stop reason: HOSPADM

## 2023-02-04 RX ORDER — SODIUM POLYSTYRENE SULFONATE 15 G/60ML
15 SUSPENSION ORAL; RECTAL ONCE
Status: COMPLETED | OUTPATIENT
Start: 2023-02-05 | End: 2023-02-05

## 2023-02-04 RX ORDER — DEXTROSE MONOHYDRATE 100 MG/ML
INJECTION, SOLUTION INTRAVENOUS CONTINUOUS PRN
Status: DISCONTINUED | OUTPATIENT
Start: 2023-02-04 | End: 2023-02-07 | Stop reason: HOSPADM

## 2023-02-04 RX ORDER — ONDANSETRON 2 MG/ML
4 INJECTION INTRAMUSCULAR; INTRAVENOUS EVERY 6 HOURS PRN
Status: DISCONTINUED | OUTPATIENT
Start: 2023-02-04 | End: 2023-02-07 | Stop reason: HOSPADM

## 2023-02-04 RX ORDER — SODIUM CHLORIDE 9 MG/ML
INJECTION, SOLUTION INTRAVENOUS CONTINUOUS
Status: ACTIVE | OUTPATIENT
Start: 2023-02-05 | End: 2023-02-05

## 2023-02-04 RX ORDER — ACETAMINOPHEN 650 MG/1
650 SUPPOSITORY RECTAL EVERY 6 HOURS PRN
Status: DISCONTINUED | OUTPATIENT
Start: 2023-02-04 | End: 2023-02-07 | Stop reason: HOSPADM

## 2023-02-04 RX ORDER — SODIUM CHLORIDE 9 MG/ML
INJECTION, SOLUTION INTRAVENOUS PRN
Status: DISCONTINUED | OUTPATIENT
Start: 2023-02-04 | End: 2023-02-07 | Stop reason: HOSPADM

## 2023-02-04 RX ORDER — 0.9 % SODIUM CHLORIDE 0.9 %
1000 INTRAVENOUS SOLUTION INTRAVENOUS ONCE
Status: DISCONTINUED | OUTPATIENT
Start: 2023-02-04 | End: 2023-02-07 | Stop reason: HOSPADM

## 2023-02-04 RX ORDER — INSULIN LISPRO 100 [IU]/ML
0-4 INJECTION, SOLUTION INTRAVENOUS; SUBCUTANEOUS NIGHTLY
Status: DISCONTINUED | OUTPATIENT
Start: 2023-02-05 | End: 2023-02-07 | Stop reason: HOSPADM

## 2023-02-04 RX ORDER — ACETAMINOPHEN 325 MG/1
650 TABLET ORAL EVERY 6 HOURS PRN
Status: DISCONTINUED | OUTPATIENT
Start: 2023-02-04 | End: 2023-02-07 | Stop reason: HOSPADM

## 2023-02-04 ASSESSMENT — PAIN - FUNCTIONAL ASSESSMENT: PAIN_FUNCTIONAL_ASSESSMENT: NONE - DENIES PAIN

## 2023-02-04 ASSESSMENT — ENCOUNTER SYMPTOMS
VOMITING: 0
SHORTNESS OF BREATH: 0
TROUBLE SWALLOWING: 0
ABDOMINAL DISTENTION: 0
ABDOMINAL PAIN: 0
BACK PAIN: 0
COUGH: 0
CONSTIPATION: 0
DIARRHEA: 0
COLOR CHANGE: 0
CHEST TIGHTNESS: 0
DIARRHEA: 1
PHOTOPHOBIA: 0
NAUSEA: 0
NAUSEA: 1
FACIAL SWELLING: 0

## 2023-02-04 NOTE — ED PROVIDER NOTES
Nirali Stuart is a 68-year-old male present emergency department with concern for episode of lethargy and confusion. Patient son stated that he has been very tired since last night. Patient has been sleeping most of the day. Patient got in the car with his son to go out to eat with a another family member and patient's son felt the patient was not acting normally patient son attempted to get him to drink something and suspected his blood sugar was low however he did drink an still was lethargic and shaking. Patient's son called EMS who brought patient Emergency Department for evaluation. Patient denies dizziness or lightheadedness patient is awake alert and oriented patient states he is very tired patient is blind in his left eye patient denies a history of alcohol or drug abuse. Patient is a diabetic. Patient denies trauma. Patient denies nausea or vision complaints. The history is provided by the patient, medical records and the EMS personnel. Review of Systems   Constitutional:  Positive for fatigue. Negative for chills, diaphoresis and fever. Eyes:  Negative for photophobia and visual disturbance. Respiratory:  Negative for cough, chest tightness and shortness of breath. Cardiovascular:  Negative for chest pain, palpitations and leg swelling. Gastrointestinal:  Negative for abdominal distention, abdominal pain, diarrhea, nausea and vomiting. Genitourinary:  Negative for dysuria. Musculoskeletal:  Negative for back pain, neck pain and neck stiffness. Skin:  Negative for pallor and rash. Neurological:  Negative for dizziness, tremors, seizures, syncope, facial asymmetry, speech difficulty, weakness, light-headedness, numbness and headaches. Psychiatric/Behavioral:  Negative for confusion. Physical Exam  Vitals and nursing note reviewed. Constitutional:       General: He is not in acute distress. Appearance: Normal appearance. He is not ill-appearing.    HENT: Head: Normocephalic and atraumatic. Eyes:      General: No scleral icterus. Conjunctiva/sclera: Conjunctivae normal.      Pupils: Pupils are equal, round, and reactive to light. Cardiovascular:      Rate and Rhythm: Normal rate and regular rhythm. Pulmonary:      Effort: Pulmonary effort is normal.      Breath sounds: Normal breath sounds. Abdominal:      General: Bowel sounds are normal. There is no distension. Palpations: Abdomen is soft. Tenderness: There is no abdominal tenderness. There is no guarding or rebound. Musculoskeletal:      Cervical back: Normal range of motion and neck supple. No rigidity. No muscular tenderness. Right lower leg: No edema. Left lower leg: No edema. Skin:     General: Skin is warm and dry. Capillary Refill: Capillary refill takes less than 2 seconds. Coloration: Skin is not pale. Findings: No erythema or rash. Neurological:      General: No focal deficit present. Mental Status: He is alert and oriented to person, place, and time. Cranial Nerves: No cranial nerve deficit. Sensory: No sensory deficit. Motor: No weakness. Coordination: Coordination normal.      Gait: Gait normal.      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        Procedures     MDM  Number of Diagnoses or Management Options  Acute renal failure, unspecified acute renal failure type ([address]  Altered mental status, unspecified altered mental status type  Diagnosis management comments: Nirali Stuart is a 50year old male with PMH of HTN, epiglottitis, hernia who presented to ED with lethargy and fatigue.   Patient was evaluated at time of arrival to ED found to have normal neurological exam NIH 0  Patient was fatigued and did appear to have mild confusion  Son reported patient has been sleeping more than usual   Patient reported he has fallen several days ago and did hit his head  Lab work was significant for SIMÓN and elevated INR  Patient had new cr 3.9 hernandez was placed, CT abd was significant for possible hematoma with elevated INR surgery consulted to evaluate patient  Fatigue likely secondary to SIMÓN  Patient will be admitted for SIMÓN and elevated INR, patient was not found to have active bleeding likely hematoma unchanged from previous, reversal of INR considered not given patient stable and did not have likely active bleeding  CT head reviewed and unremarkable for ICH  CXR reviewed by myself and radiology unremarkable for acute process  Patient afebrile and agreeable to admission  Consult ordered to nephrology and surgery  Case discussed with medicine patient will be admitted    Patient was given IVF in ED  Co morbidities include not limited to DM, OA, HTN,   Differential diagnosis includes not limited to [address], metabolic encephalopathy, cva, intoxication, renal failure,          ED Course as of 02/05/23 0307   Sat Feb 04, 2023 1801 EKG: This EKG is signed and interpreted by me. Rate: 88  Rhythm: Sinus  Interpretation: non-specific EKG, poor quality, normal axis  Comparison: stable as compared to patient's most recent EKG   [SS]   2151 CT was read for concerning hematoma. I did discuss this with patient he does have an elevated INR CT was done without contrast due to SIMÓN.   Patient did state that several days ago he actually did tripped while going up the stairs   Patient stated he did hit his head at that time but does not think he had any other injuries and denies any abdominal trauma  Patient had this on previous imaging surgery consulted patient was already admitted to medicine when CT resulted  [SS]      ED Course User Index  [SS] Tami Aguilar MD        ED Course as of 02/05/23 0307   Sat Feb 04, 2023   1801 EKG:  This EKG is signed and interpreted by me. Rate: 88  Rhythm: Sinus  Interpretation: non-specific EKG, poor quality, normal axis  Comparison: stable as compared to patient's most recent EKG   [SS]   2151 CT was read for concerning hematoma. I did discuss this with patient he does have an elevated INR CT was done without contrast due to SIMÓN. Patient did state that several days ago he actually did tripped while going up the stairs   Patient stated he did hit his head at that time but does not think he had any other injuries and denies any abdominal trauma  Patient had this on previous imaging surgery consulted patient was already admitted to medicine when CT resulted  [SS]      ED Course User Index  [SS] Yaya Townsend MD       --------------------------------------------- PAST HISTORY ---------------------------------------------  Past Medical History:  has a past medical history of Accident, Acute thrombosis of inferior vena cava ([address] SIMÓN (acute kidney injury) ([address] Allergic rhinitis, Blister of left leg, Cellulitis of leg, left, Chronic back pain, Depression, Dermatophytosis, Diabetes mellitus ([address] Difficulty sleeping, Displacement of lumbar intervertebral disc without myelopathy, Fibromyalgia, Fractured rib, H/O seasonal allergies, Head injury, History of deep vein thrombosis, Hyperlipidemia, Hypertension, 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[address] Lymphedema of left leg, Obesity (BMI 35.0-39.9 without comorbidity), Osteoarthritis, Recurrent acute deep vein thrombosis (DVT) of left lower extremity ([address] S/P IVC filter, Subtherapeutic international normalized ratio (INR), and Thoracic or lumbosacral neuritis or radiculitis, unspecified. Past Surgical History:  has a past surgical history that includes Neck surgery (2000); shoulder surgery (2001 &2007); Wrist surgery (2007); Rotator cuff repair (Left, 2014); hernia repair (2001); Shoulder arthroscopy (Left, 11 21 14); Vasectomy;  Hip Arthroplasty (Left, 4/11/2016); Total hip arthroplasty (Right, 10/31/2016); Foot surgery (Right, 1985); pr colonoscopy flx dx w/collj spec when pfrmd (N/A, 5/7/2018); hernia repair (Right, 12/9/2019); CT PTC NEW ACCESS (8/3/2020); Upper gastrointestinal endoscopy (N/A, 9/4/2020); Colonoscopy (N/A, 9/5/2020); laparotomy (N/A, 9/18/2020); iliac artery stent insertion (N/A, 10/11/2020); THROMBECTOMY / EMBOLECTOMY FEMORAL (Left, 1/1/2021); THROMBECTOMY / EMBOLECTOMY FEMORAL (Left, 1/2/2021); Vena Cava Filter Placement (Left, 1/3/2021); and ventral hernia repair (N/A, 5/12/2022). Social History:  reports that he has never smoked. His smokeless tobacco use includes chew. He reports that he does not currently use alcohol. He reports that he does not use drugs. Family History: family history includes Arthritis in his father; Cancer in his maternal grandfather and paternal uncle; Diabetes in his father and paternal grandfather; Heart Disease in his maternal grandmother; Hypertension in his mother; Stroke in his maternal aunt. The patients home medications have been reviewed.     Allergies: Seasonal and Tramadol    -------------------------------------------------- RESULTS -------------------------------------------------    LABS:  Results for orders placed or performed during the hospital encounter of 02/04/23   COVID-19, Rapid    Specimen: Nasopharyngeal Swab   Result Value Ref Range    SARS-CoV-2, NAAT Not Detected Not Detected   RAPID INFLUENZA A/B ANTIGENS    Specimen: Nasopharyngeal   Result Value Ref Range    Influenza A by PCR Not Detected Not Detected    Influenza B by PCR Not Detected Not Detected   CBC with Auto Differential   Result Value Ref Range    WBC 11.3 4.5 - 11.5 E9/L    RBC 3.36 (L) 3.80 - 5.80 E12/L    Hemoglobin 10.8 (L) 12.5 - 16.5 g/dL    Hematocrit 32.2 (L) 37.0 - 54.0 %    MCV 95.8 80.0 - 99.9 fL    MCH 32.1 26.0 - 35.0 pg    MCHC 33.5 32.0 - 34.5 %    RDW 21.2 (H) 11.5 - 15.0 fL    Platelets 329 920 - 450 E9/L    MPV 9.2 7.0 - 12.0 fL    Neutrophils % 77.4 43.0 - 80.0 %    Lymphocytes % 9.5 (L) 20.0 - 42.0 %    Monocytes % 12.2 (H) 2.0 - 12.0 %    Eosinophils % 0.9 0.0 - 6.0 %    Basophils % 0.8 0.0 - 2.0 %    Neutrophils Absolute 8.70 (H) 1.80 - 7.30 E9/L    Lymphocytes Absolute 1.13 (L) 1.50 - 4.00 E9/L    Monocytes Absolute 1.36 (H) 0.10 - 0.95 E9/L    Eosinophils Absolute 0.10 0.05 - 0.50 E9/L    Basophils Absolute 0.00 0.00 - 0.20 E9/L    Hypersegmented Neutrophils 1+     Anisocytosis 2+     Polychromasia 1+     Poikilocytes 2+     Schistocytes 1+     Acanthocytes 1+     Andreina Cells 1+     Ovalocytes 2+     Target Cells 1+    Comprehensive Metabolic Panel w/ Reflex to MG   Result Value Ref Range    Sodium 135 132 - 146 mmol/L    Potassium reflex Magnesium 5.3 (H) 3.5 - 5.0 mmol/L    Chloride 101 98 - 107 mmol/L    CO2 19 (L) 22 - 29 mmol/L    Anion Gap 15 7 - 16 mmol/L    Glucose 162 (H) 74 - 99 mg/dL    BUN 59 (H) 6 - 20 mg/dL    Creatinine 3.9 (H) 0.7 - 1.2 mg/dL    Est, Glom Filt Rate 18 >=60 mL/min/1.73    Calcium 8.9 8.6 - 10.2 mg/dL    Total Protein 6.9 6.4 - 8.3 g/dL    Albumin 4.2 3.5 - 5.2 g/dL    Total Bilirubin <0.2 0.0 - 1.2 mg/dL    Alkaline Phosphatase 106 40 - 129 U/L    ALT 37 0 - 40 U/L    AST 55 (H) 0 - 39 U/L   Troponin   Result Value Ref Range    Troponin, High Sensitivity 85 (H) 0 - 11 ng/L   Brain Natriuretic Peptide   Result Value Ref Range    Pro-BNP 1,329 (H) 0 - 125 pg/mL   Urine Drug Screen   Result Value Ref Range    Amphetamine Screen, Urine NOT DETECTED Negative <1000 ng/mL    Barbiturate Screen, Ur NOT DETECTED Negative < 200 ng/mL    Benzodiazepine Screen, Urine NOT DETECTED Negative < 200 ng/mL    Cannabinoid Scrn, Ur NOT DETECTED Negative < 50ng/mL    Cocaine Metabolite Screen, Urine NOT DETECTED Negative < 300 ng/mL    Opiate Scrn, Ur NOT DETECTED Negative < 300ng/mL    PCP Screen, Urine NOT DETECTED Negative < 25 ng/mL    Methadone Screen, Urine NOT DETECTED Negative <300 ng/mL    Oxycodone Urine NOT DETECTED Negative <100 ng/mL    FENTANYL SCREEN, URINE POSITIVE (A) Negative <1 ng/mL    Drug Screen Comment: see below    Serum Drug Screen   Result Value Ref Range    Ethanol Lvl <10 mg/dL    Acetaminophen Level <5.0 (L) 10.0 - 81.6 mcg/mL    Salicylate, Serum <2.1 0.0 - 30.0 mg/dL    TCA Scrn NEGATIVE Cutoff:300 ng/mL   Ammonia   Result Value Ref Range    Ammonia 22.0 16.0 - 60.0 umol/L   Urinalysis with Microscopic   Result Value Ref Range    Color, UA Yellow Straw/Yellow    Clarity, UA Clear Clear    Glucose, Ur Negative Negative mg/dL    Bilirubin Urine Negative Negative    Ketones, Urine Negative Negative mg/dL    Specific Gravity, UA >=1.030 1.005 - 1.030    Blood, Urine SMALL (A) Negative    pH, UA 5.5 5.0 - 9.0    Protein, UA 30 (A) Negative mg/dL    Urobilinogen, Urine 0.2 <2.0 E.U./dL    Nitrite, Urine Negative Negative    Leukocyte Esterase, Urine Negative Negative    Hyaline Casts, UA 0-2 0 - 2 /LPF    WBC, UA 0-1 0 - 5 /HPF    RBC, UA NONE 0 - 2 /HPF    Bacteria, UA FEW (A) None Seen /HPF   Protime-INR   Result Value Ref Range    Protime 65.8 (H) 9.3 - 12.4 sec    INR 6.1 (HH)    Lactate, Sepsis   Result Value Ref Range    Lactic Acid, Sepsis 1.1 0.5 - 1.9 mmol/L   Lactate, Sepsis   Result Value Ref Range    Lactic Acid, Sepsis 0.7 0.5 - 1.9 mmol/L   Hemoglobin and Hematocrit   Result Value Ref Range    Hemoglobin 10.0 (L) 12.5 - 16.5 g/dL    Hematocrit 31.1 (L) 37.0 - 54.0 %   Troponin   Result Value Ref Range    Troponin, High Sensitivity 67 (H) 0 - 11 ng/L   Hemoglobin A1c   Result Value Ref Range    Hemoglobin A1C 5.8 (H) 4.0 - 5.6 %   EKG 12 Lead   Result Value Ref Range    Ventricular Rate 88 BPM    Atrial Rate 88 BPM    P-R Interval 168 ms    QRS Duration 102 ms    Q-T Interval 340 ms    QTc Calculation (Bazett) 411 ms    P Axis 41 degrees    R Axis 46 degrees    T Axis 40 degrees   TYPE AND SCREEN   Result Value Ref Range    ABO/Rh O NEG     Antibody Screen NEG RADIOLOGY:  CT HEAD WO CONTRAST   Final Result   No acute intracranial abnormality. CT ABDOMEN PELVIS WO CONTRAST Additional Contrast? None   Final Result   1. No urinary obstruction is seen   2. However there is anterior bladder wall thickening and ventral to this on   the right ill-defined soft tissue mass area which is suspicious for hematoma   partially involving rectus sheath although was also present previously to   indicate recurrence or residual, or the possibility of other etiology   3. Posterior subcutaneous right paramidline apparent complex fluid collection      RECOMMENDATIONS:   Clinical correlation. IV contrasted exam may be helpful to further evaluate,   or MRI, as clinically warranted         XR CHEST PORTABLE   Final Result   No acute cardiopulmonary process. EKG:  This EKG is signed and interpreted by me. Rate: 88  Rhythm: Sinus  Interpretation: non-specific EKG, no St elevation   Comparison: was normal and no previous EKG available      ------------------------- NURSING NOTES AND VITALS REVIEWED ---------------------------  Date / Time Roomed:  2/4/2023  5:48 PM  ED Bed Assignment:  [id number]/[id number]    The nursing notes within the ED encounter and vital signs as below have been reviewed. Patient Vitals for the past 24 hrs:   BP Temp Temp src Pulse Resp SpO2   02/04/23 2315 124/75 97.5 °F (36.4 °C) Temporal 82 18 98 %   02/04/23 1752 (!) 160/108 97.6 °F (36.4 °C) -- 95 18 96 %       Oxygen Saturation Interpretation: Normal    ------------------------------------------ PROGRESS NOTES ------------------------------------------  Re-evaluation(s):  Time: 8pm  Patients symptoms show no change  Repeat physical examination is not changed    Counseling:  I have spoken with the patient and discussed todays results, in addition to providing specific details for the plan of care and counseling regarding the diagnosis and prognosis.   Their questions are answered at this time and they are agreeable with the plan of admission.    --------------------------------- ADDITIONAL PROVIDER NOTES ---------------------------------  Consultations:  Spoke with [facility]. Discussed case. They will admit the patient. This patient's ED course included: a personal history and physicial examination, re-evaluation prior to disposition, multiple bedside re-evaluations, IV medications, cardiac monitoring, and complex medical decision making and emergency management    This patient has remained hemodynamically stable during their ED course. Diagnosis:  1. Acute renal failure, unspecified acute renal failure type ([address]    2. Altered mental status, unspecified altered mental status type    3. Supratherapeutic INR    4. Abdominal hematoma        Disposition:  Patient's disposition: Admit to telemetry  Patient's condition is stable.          Waqas Vargas MD  02/05/23 6581

## 2023-02-05 ENCOUNTER — APPOINTMENT (OUTPATIENT)
Dept: ULTRASOUND IMAGING | Age: 48
DRG: 469 | End: 2023-02-05
Payer: MEDICAID

## 2023-02-05 PROBLEM — R79.89 ELEVATED TROPONIN: Status: ACTIVE | Noted: 2023-02-05

## 2023-02-05 PROBLEM — R77.8 ELEVATED TROPONIN: Status: ACTIVE | Noted: 2023-02-05

## 2023-02-05 PROBLEM — D64.9 ANEMIA: Status: ACTIVE | Noted: 2023-02-05

## 2023-02-05 LAB
ABO/RH: NORMAL
ACANTHOCYTES: ABNORMAL
ALBUMIN SERPL-MCNC: 3.9 G/DL (ref 3.5–5.2)
ALP BLD-CCNC: 96 U/L (ref 40–129)
ALT SERPL-CCNC: 33 U/L (ref 0–40)
ANION GAP SERPL CALCULATED.3IONS-SCNC: 10 MMOL/L (ref 7–16)
ANION GAP SERPL CALCULATED.3IONS-SCNC: 13 MMOL/L (ref 7–16)
ANION GAP SERPL CALCULATED.3IONS-SCNC: 8 MMOL/L (ref 7–16)
ANISOCYTOSIS: ABNORMAL
ANTIBODY SCREEN: NORMAL
AST SERPL-CCNC: 45 U/L (ref 0–39)
BASOPHILIC STIPPLING: ABNORMAL
BASOPHILS ABSOLUTE: 0.07 E9/L (ref 0–0.2)
BASOPHILS RELATIVE PERCENT: 0.9 % (ref 0–2)
BILIRUB SERPL-MCNC: 0.3 MG/DL (ref 0–1.2)
BUN BLDV-MCNC: 40 MG/DL (ref 6–20)
BUN BLDV-MCNC: 49 MG/DL (ref 6–20)
BUN BLDV-MCNC: 59 MG/DL (ref 6–20)
BURR CELLS: ABNORMAL
C-REACTIVE PROTEIN: 3.6 MG/DL (ref 0–0.4)
CALCIUM SERPL-MCNC: 8.5 MG/DL (ref 8.6–10.2)
CALCIUM SERPL-MCNC: 8.7 MG/DL (ref 8.6–10.2)
CALCIUM SERPL-MCNC: 8.8 MG/DL (ref 8.6–10.2)
CHLORIDE BLD-SCNC: 106 MMOL/L (ref 98–107)
CHLORIDE BLD-SCNC: 109 MMOL/L (ref 98–107)
CHLORIDE BLD-SCNC: 111 MMOL/L (ref 98–107)
CHLORIDE URINE RANDOM: 65 MMOL/L
CO2: 18 MMOL/L (ref 22–29)
CO2: 19 MMOL/L (ref 22–29)
CO2: 20 MMOL/L (ref 22–29)
CREAT SERPL-MCNC: 1.5 MG/DL (ref 0.7–1.2)
CREAT SERPL-MCNC: 2.2 MG/DL (ref 0.7–1.2)
CREAT SERPL-MCNC: 3 MG/DL (ref 0.7–1.2)
CREATININE URINE: 94 MG/DL (ref 40–278)
CREATININE URINE: 94 MG/DL (ref 40–278)
EOSINOPHILS ABSOLUTE: 0.32 E9/L (ref 0.05–0.5)
EOSINOPHILS RELATIVE PERCENT: 3.9 % (ref 0–6)
GFR SERPL CREATININE-BSD FRML MDRD: 25 ML/MIN/1.73
GFR SERPL CREATININE-BSD FRML MDRD: 36 ML/MIN/1.73
GFR SERPL CREATININE-BSD FRML MDRD: 57 ML/MIN/1.73
GLUCOSE BLD-MCNC: 102 MG/DL (ref 74–99)
GLUCOSE BLD-MCNC: 117 MG/DL (ref 74–99)
GLUCOSE BLD-MCNC: 75 MG/DL (ref 74–99)
HBA1C MFR BLD: 5.7 % (ref 4–5.6)
HBA1C MFR BLD: 5.8 % (ref 4–5.6)
HCT VFR BLD CALC: 30.1 % (ref 37–54)
HCT VFR BLD CALC: 30.1 % (ref 37–54)
HCT VFR BLD CALC: 31.1 % (ref 37–54)
HEMOGLOBIN: 10 G/DL (ref 12.5–16.5)
HEMOGLOBIN: 10 G/DL (ref 12.5–16.5)
HEMOGLOBIN: 10.1 G/DL (ref 12.5–16.5)
HOWELL-JOLLY BODIES: ABNORMAL
IMMATURE GRANULOCYTES #: 0.02 E9/L
IMMATURE GRANULOCYTES %: 0.2 % (ref 0–5)
INR BLD: 2.9
INR BLD: 6.3
INR BLD: 6.6
LACTIC ACID, SEPSIS: 0.7 MMOL/L (ref 0.5–1.9)
LYMPHOCYTES ABSOLUTE: 2.46 E9/L (ref 1.5–4)
LYMPHOCYTES RELATIVE PERCENT: 29.9 % (ref 20–42)
MCH RBC QN AUTO: 31.9 PG (ref 26–35)
MCHC RBC AUTO-ENTMCNC: 33.2 % (ref 32–34.5)
MCV RBC AUTO: 96.2 FL (ref 80–99.9)
METER GLUCOSE: 101 MG/DL (ref 74–99)
METER GLUCOSE: 117 MG/DL (ref 74–99)
METER GLUCOSE: 75 MG/DL (ref 74–99)
METER GLUCOSE: 97 MG/DL (ref 74–99)
MONOCYTES ABSOLUTE: 0.87 E9/L (ref 0.1–0.95)
MONOCYTES RELATIVE PERCENT: 10.6 % (ref 2–12)
NEUTROPHILS ABSOLUTE: 4.48 E9/L (ref 1.8–7.3)
NEUTROPHILS RELATIVE PERCENT: 54.5 % (ref 43–80)
OVALOCYTES: ABNORMAL
PDW BLD-RTO: 21.4 FL (ref 11.5–15)
PLATELET # BLD: 334 E9/L (ref 130–450)
PMV BLD AUTO: 8.8 FL (ref 7–12)
POIKILOCYTES: ABNORMAL
POLYCHROMASIA: ABNORMAL
POTASSIUM REFLEX MAGNESIUM: 5.4 MMOL/L (ref 3.5–5)
POTASSIUM SERPL-SCNC: 4.9 MMOL/L (ref 3.5–5)
POTASSIUM SERPL-SCNC: 5.2 MMOL/L (ref 3.5–5)
PRO-BNP: 1182 PG/ML (ref 0–125)
PROCALCITONIN: 0.14 NG/ML (ref 0–0.08)
PROTEIN PROTEIN: 9 MG/DL (ref 0–12)
PROTEIN/CREAT RATIO: 0.1
PROTEIN/CREAT RATIO: 0.1 (ref 0–0.2)
PROTHROMBIN TIME: 31.4 SEC (ref 9.3–12.4)
PROTHROMBIN TIME: 67.7 SEC (ref 9.3–12.4)
PROTHROMBIN TIME: 70.7 SEC (ref 9.3–12.4)
RBC # BLD: 3.13 E12/L (ref 3.8–5.8)
SODIUM BLD-SCNC: 137 MMOL/L (ref 132–146)
SODIUM BLD-SCNC: 138 MMOL/L (ref 132–146)
SODIUM BLD-SCNC: 139 MMOL/L (ref 132–146)
SODIUM URINE: 75 MMOL/L
TARGET CELLS: ABNORMAL
TOTAL PROTEIN: 6.5 G/DL (ref 6.4–8.3)
TROPONIN, HIGH SENSITIVITY: 47 NG/L (ref 0–11)
TROPONIN, HIGH SENSITIVITY: 64 NG/L (ref 0–11)
TROPONIN, HIGH SENSITIVITY: 67 NG/L (ref 0–11)
WBC # BLD: 8.2 E9/L (ref 4.5–11.5)

## 2023-02-05 PROCEDURE — 6360000002 HC RX W HCPCS: Performed by: FAMILY MEDICINE

## 2023-02-05 PROCEDURE — 82962 GLUCOSE BLOOD TEST: CPT

## 2023-02-05 PROCEDURE — 85018 HEMOGLOBIN: CPT

## 2023-02-05 PROCEDURE — 84145 PROCALCITONIN (PCT): CPT

## 2023-02-05 PROCEDURE — 85025 COMPLETE CBC W/AUTO DIFF WBC: CPT

## 2023-02-05 PROCEDURE — 2580000003 HC RX 258: Performed by: FAMILY MEDICINE

## 2023-02-05 PROCEDURE — 87040 BLOOD CULTURE FOR BACTERIA: CPT

## 2023-02-05 PROCEDURE — 80053 COMPREHEN METABOLIC PANEL: CPT

## 2023-02-05 PROCEDURE — 83605 ASSAY OF LACTIC ACID: CPT

## 2023-02-05 PROCEDURE — 99254 IP/OBS CNSLTJ NEW/EST MOD 60: CPT | Performed by: INTERNAL MEDICINE

## 2023-02-05 PROCEDURE — 86140 C-REACTIVE PROTEIN: CPT

## 2023-02-05 PROCEDURE — 82570 ASSAY OF URINE CREATININE: CPT

## 2023-02-05 PROCEDURE — 86900 BLOOD TYPING SEROLOGIC ABO: CPT

## 2023-02-05 PROCEDURE — 99222 1ST HOSP IP/OBS MODERATE 55: CPT | Performed by: SURGERY

## 2023-02-05 PROCEDURE — 93005 ELECTROCARDIOGRAM TRACING: CPT | Performed by: INTERNAL MEDICINE

## 2023-02-05 PROCEDURE — 83036 HEMOGLOBIN GLYCOSYLATED A1C: CPT

## 2023-02-05 PROCEDURE — 82436 ASSAY OF URINE CHLORIDE: CPT

## 2023-02-05 PROCEDURE — 2580000003 HC RX 258

## 2023-02-05 PROCEDURE — 36415 COLL VENOUS BLD VENIPUNCTURE: CPT

## 2023-02-05 PROCEDURE — 6360000002 HC RX W HCPCS: Performed by: INTERNAL MEDICINE

## 2023-02-05 PROCEDURE — 86901 BLOOD TYPING SEROLOGIC RH(D): CPT

## 2023-02-05 PROCEDURE — 83880 ASSAY OF NATRIURETIC PEPTIDE: CPT

## 2023-02-05 PROCEDURE — 85014 HEMATOCRIT: CPT

## 2023-02-05 PROCEDURE — 80048 BASIC METABOLIC PNL TOTAL CA: CPT

## 2023-02-05 PROCEDURE — 6370000000 HC RX 637 (ALT 250 FOR IP): Performed by: FAMILY MEDICINE

## 2023-02-05 PROCEDURE — 84300 ASSAY OF URINE SODIUM: CPT

## 2023-02-05 PROCEDURE — 84156 ASSAY OF PROTEIN URINE: CPT

## 2023-02-05 PROCEDURE — 84484 ASSAY OF TROPONIN QUANT: CPT

## 2023-02-05 PROCEDURE — 6370000000 HC RX 637 (ALT 250 FOR IP): Performed by: INTERNAL MEDICINE

## 2023-02-05 PROCEDURE — 85610 PROTHROMBIN TIME: CPT

## 2023-02-05 PROCEDURE — 2060000000 HC ICU INTERMEDIATE R&B

## 2023-02-05 PROCEDURE — 2580000003 HC RX 258: Performed by: INTERNAL MEDICINE

## 2023-02-05 PROCEDURE — 86850 RBC ANTIBODY SCREEN: CPT

## 2023-02-05 PROCEDURE — 76770 US EXAM ABDO BACK WALL COMP: CPT

## 2023-02-05 RX ORDER — SODIUM CHLORIDE 9 MG/ML
INJECTION, SOLUTION INTRAVENOUS CONTINUOUS
Status: DISCONTINUED | OUTPATIENT
Start: 2023-02-05 | End: 2023-02-06

## 2023-02-05 RX ORDER — GABAPENTIN 300 MG/1
300 CAPSULE ORAL 3 TIMES DAILY
Status: DISCONTINUED | OUTPATIENT
Start: 2023-02-05 | End: 2023-02-05

## 2023-02-05 RX ORDER — INSULIN GLARGINE 100 [IU]/ML
7 INJECTION, SOLUTION SUBCUTANEOUS NIGHTLY
Status: DISCONTINUED | OUTPATIENT
Start: 2023-02-05 | End: 2023-02-07 | Stop reason: HOSPADM

## 2023-02-05 RX ORDER — CHOLESTYRAMINE 4 G/9G
1 POWDER, FOR SUSPENSION ORAL DAILY
Status: DISCONTINUED | OUTPATIENT
Start: 2023-02-05 | End: 2023-02-07 | Stop reason: HOSPADM

## 2023-02-05 RX ORDER — SODIUM POLYSTYRENE SULFONATE 15 G/60ML
30 SUSPENSION ORAL; RECTAL ONCE
Status: COMPLETED | OUTPATIENT
Start: 2023-02-05 | End: 2023-02-05

## 2023-02-05 RX ORDER — GABAPENTIN 300 MG/1
300 CAPSULE ORAL DAILY
Status: DISCONTINUED | OUTPATIENT
Start: 2023-02-05 | End: 2023-02-07 | Stop reason: HOSPADM

## 2023-02-05 RX ORDER — ASPIRIN 81 MG/1
81 TABLET, CHEWABLE ORAL DAILY
Status: DISCONTINUED | OUTPATIENT
Start: 2023-02-05 | End: 2023-02-07 | Stop reason: HOSPADM

## 2023-02-05 RX ADMIN — SODIUM ZIRCONIUM CYCLOSILICATE 10 G: 10 POWDER, FOR SUSPENSION ORAL at 10:13

## 2023-02-05 RX ADMIN — SODIUM CHLORIDE: 9 INJECTION, SOLUTION INTRAVENOUS at 00:17

## 2023-02-05 RX ADMIN — SODIUM CHLORIDE: 9 INJECTION, SOLUTION INTRAVENOUS at 14:05

## 2023-02-05 RX ADMIN — PHYTONADIONE 2 MG: 10 INJECTION, EMULSION INTRAMUSCULAR; INTRAVENOUS; SUBCUTANEOUS at 10:41

## 2023-02-05 RX ADMIN — SODIUM POLYSTYRENE SULFONATE 15 G: 15 SUSPENSION ORAL; RECTAL at 00:13

## 2023-02-05 RX ADMIN — CALCIUM GLUCONATE 1000 MG: 98 INJECTION, SOLUTION INTRAVENOUS at 05:11

## 2023-02-05 RX ADMIN — GABAPENTIN 300 MG: 300 CAPSULE ORAL at 12:48

## 2023-02-05 RX ADMIN — METOPROLOL SUCCINATE 50 MG: 50 TABLET, EXTENDED RELEASE ORAL at 10:13

## 2023-02-05 RX ADMIN — SODIUM CHLORIDE, PRESERVATIVE FREE 10 ML: 5 INJECTION INTRAVENOUS at 10:43

## 2023-02-05 RX ADMIN — CHOLESTYRAMINE 4 G: 4 POWDER, FOR SUSPENSION ORAL at 14:03

## 2023-02-05 RX ADMIN — SODIUM POLYSTYRENE SULFONATE 30 G: 15 SUSPENSION ORAL; RECTAL at 05:07

## 2023-02-05 ASSESSMENT — PAIN SCALES - GENERAL: PAINLEVEL_OUTOF10: 0

## 2023-02-05 NOTE — CONSULTS
GENERAL SURGERY  CONSULT NOTE  2/4/2023    Physician Consulted: Dr. Mackenzie Rodriguez  Reason for Consult: intra-abdominal abscess  Referring Physician: Dr. Guero Asher is a 50 y.o. male who presents to the general surgery service for evaluation of findings on his CT abdomen/pelvis, which could represent intra-abdominal hematoma. The patient presented to the ED today for evaluation of 1 day of lethargy, confusion, and tremor. The patient has extensive abdominal surgical history, including right inguinal hernia repair in 2019, ex lap with small bowel resection, right hemicolectomy, and cholecystectomy in 2020, and modified [place] Stoppa ventral incisional hernia repair in 2022. The patient states that since his bowel resection, he has had ongoing issues with nausea and diarrhea. He has lost at least 50 pounds throughout this time. He states that for the past few days, he has not been able to take in much orally, and his urine output has decreased. Medical history is also significant for DVT/PE. The patient has an IVC filter in place. He is currently taking warfarin, with last dose yesterday. In the ED, the patient was found to have SIMÓN, with creatinine 3.9. INR elevated at 6.1. He  reports that he has never smoked. His smokeless tobacco use includes chew. He reports that he does not currently use alcohol. He reports that he does not use drugs.     The patient has been admitted to the internal medicine service, and a nephrology consult has been placed      Past Medical History:   Diagnosis Date    Accident 11/2019    stepped on nail rt ft about 1 month ago- healed per patient    Acute thrombosis of inferior vena cava ([address] 10/10/2020    SIMÓN (acute kidney injury) ([address] 2008 apx    kidney bruised due to fall / [place]    Allergic rhinitis     Blister of left leg 8/31/2021    Cellulitis of leg, left 8/31/2021    Chronic back pain     Depression     Dermatophytosis 8/31/2021    Diabetes mellitus ([facility]     Difficulty sleeping     at times    Displacement of lumbar intervertebral disc without myelopathy     Fibromyalgia     Fractured rib     2008 / healed    H/O seasonal allergies     Head injury 1980'S apx    no residual s/s    History of deep vein thrombosis 8/31/2021    Hyperlipidemia     Hypertension     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    Lymphedema of left leg 8/31/2021    Obesity (BMI 35.0-39.9 without comorbidity)     bmi 39.2  weight 296 #    Osteoarthritis     Recurrent acute deep vein thrombosis (DVT) of left lower extremity ([facility] 8/31/2021    S/P IVC filter 8/31/2021    Subtherapeutic international normalized ratio (INR) 8/31/2021    Thoracic or lumbosacral neuritis or radiculitis, unspecified        Past Surgical History:   Procedure Laterality Date    COLONOSCOPY N/A 9/5/2020    COLONOSCOPY WITH BIOPSY performed by Charles Knox MD at [facility] ENDOSCOPY    CT [facility] NEW ACCESS  8/3/2020    CT [facility] NEW ACCESS 8/3/2020 [facility] CT    FOOT SURGERY Right 1985    to treat shattered bones    HERNIA REPAIR  2001    DOUBLE HERNIA    HERNIA REPAIR Right 12/9/2019    LAPAROSCOPIC RIGHT INGUINAL HERNIA REPAIR, MESH 10x15 cm PLACEMENT performed by Adan Davis MD at [place] [address] Left 4/11/2016    ILIAC ARTERY STENT INSERTION N/A 10/11/2020    S/P ILIAC STENT PLACEMENT VISUALIZATION performed by Nathan Davey MD at [address] N/A 9/18/2020    LAPAROTOMY EXPLORATORY, CHOLECYSTECTOMY, BOWEL RESECTION, right darian colectomy, partial omentectomy, and splenectomy performed by Charles Knox MD at [facility] COLONOSCOPY FLX DX W/MARIA ALEJANDRA Queen 1980 P[facility] N/A 5/7/2018    COLONOSCOPY DIAGNOSTIC performed by Ashley Wasserman MD at [address] Left 2014    Dr. Sincere Merchant ARTHROSCOPY Left 11 21 14    SHOULDER SURGERY  2001 &2007    RIGHT AND LEFT repair of tears    THROMBECTOMY / EMBOLECTOMY FEMORAL Left 1/1/2021    LEFT LOWER EXTREMITY, VENOGRAM, FOLLOW UP POSSIBLE REMOVAL LYSIS CATHETER performed by Loistine Brunner, MD at [address] / EMBOLECTOMY FEMORAL Left 1/2/2021    LEFT LOWER EXTREMITY VENOGRAM performed by Loistine Brunner, MD at [address] Right 10/31/2016    Total right hip  Selene Wood MD    UPPER GASTROINTESTINAL ENDOSCOPY N/A 9/4/2020    EGD DIAGNOSTIC ONLY performed by Estefani Purdy MD at [address] Left 1/3/2021    LEFT LOWER EXTREMITY VENOGRAM, PLACEMENT OF NEW LYSIS CATHETER performed by Loistine Brunner, MD at [place] [address] N/A 5/12/2022    OPEN [address] [place] performed by Cipriano Us MD at [place] [address]  2007    LEFT WRIST       Medications Prior to Admission:    Prior to Admission medications    Medication Sig Start Date End Date Taking? Authorizing Provider   fluticasone (FLONASE) 50 MCG/ACT nasal spray instill 1 spray into each nostril once daily IN THE EVENING 1/9/23   Glo Holley PA-C   warfarin (COUMADIN) 5 MG tablet Take 1 tablet by mouth daily 11/18/22   Glo Holley PA-C   gabapentin (NEURONTIN) 300 MG capsule Take 1 capsule by mouth 3 times daily for 180 days.  Intended supply: 30 days 11/2/22 5/1/23  Glo Holley PA-C   metoprolol succinate (TOPROL XL) 50 MG extended release tablet Take 1 tablet by mouth daily 10/22/22   Shoshana cM MD   cholestyramine Myrla Spry) 4 g packet Take 1 packet by mouth daily 10/22/22   Shoshana Mc MD   insulin glargine-yfgn [facility]) 100 UNIT/ML injection vial Inject 7 Units into the skin nightly 10/21/22   Shoshana Mc MD   insulin glargine (LANTUS) 100 UNIT/ML injection vial Inject 7 Units into the skin nightly    Historical Provider, MD   cholestyramine (QUESTRAN) 4 g packet Take 1 packet by mouth daily    Historical Provider, MD   lisinopril (PRINIVIL;ZESTRIL) 5 MG tablet take 1 tablet by mouth once daily 10/5/22   Serafin Jang PA-C   loperamide (IMODIUM) 2 MG capsule take 1 capsule by mouth four times a day if needed for diarrhea 9/6/22   Serafin Jang PA-C   Continuous Blood Gluc Sensor (FREESTYLE JOANIE 14 DAY SENSOR) MISC 1 Device by Does not apply route continuous 8/22/22   Serafin Jang PA-C   aspirin 81 MG chewable tablet Take 1 tablet by mouth daily 5/15/22   Darvin Tom MD       Allergies   Allergen Reactions    Seasonal      HAYFEVER / sneezing,watery eyes    Tramadol Itching       Family History   Problem Relation Age of Onset    Hypertension Mother     Arthritis Father     Diabetes Father     Cancer Maternal Grandfather         Skin    Cancer Paternal Uncle         skin    Diabetes Paternal Grandfather     Heart Disease Maternal Grandmother     Stroke Maternal Aunt              Review of Systems   Constitutional:  Positive for activity change, appetite change, fatigue and unexpected weight change. Negative for chills and fever. HENT:  Negative for facial swelling and trouble swallowing. Respiratory:  Negative for cough and shortness of breath. Cardiovascular:  Negative for chest pain and palpitations. Gastrointestinal:  Positive for diarrhea and nausea. Negative for abdominal distention, abdominal pain, constipation and vomiting. Endocrine: Negative for polydipsia and polyphagia. Genitourinary:  Negative for difficulty urinating and dysuria. Skin:  Negative for color change and rash. Neurological:  Positive for tremors and weakness. Negative for dizziness. Psychiatric/Behavioral:  Positive for confusion. Negative for agitation and behavioral problems.         PHYSICAL EXAM:    Vitals:    02/04/23 1752   BP: (!) 160/108   Pulse: 95   Resp: 18   Temp: 97.6 °F (36.4 °C)   SpO2: 96%       General Appearance:  awake, alert, answers all questions appropriately, but seems slightly confused  Skin:  Skin color, texture, turgor normal.   Head/face:  NCAT  Eyes:  No gross abnormalities. Sclera nonicteric  Lungs/Chest:  Normal expansion. No respiratory distress. No chest wall tenderness  Heart: Warm throughout. Regular rate   Abdomen:  Soft, minimal tenderness, obese. Well-healed surgical scars. No palpable ventral hernia  Extremities: Extremities warm to touch, pink      LABS:    CBC  Recent Labs     02/04/23  1809   WBC 11.3   HGB 10.8*   HCT 32.2*        BMP  Recent Labs     02/04/23  1809      K 5.3*      CO2 19*   BUN 59*   CREATININE 3.9*   CALCIUM 8.9     Liver Function  Recent Labs     02/04/23  1809   BILITOT <0.2   AST 55*   ALT 37   ALKPHOS 106   PROT 6.9   LABALBU 4.2     No results for input(s): LACTATE in the last 72 hours. Recent Labs     02/04/23  1809   INR 6.1*         RADIOLOGY: I reviewed all relevant imaging from this admission as well as the past studies available in the EMR including: CT abdomen/pelvis from today    My assessment of the reviewed imaging is ill-defined hypodensity posterior to the right side rectus sheath. This is essentially unchanged from appearance on CT abdomen/pelvis from 4/3/2022      ASSESSMENT:  50 y.o. male with SIMÓN and supratherapeutic INR. Poor p.o. intake.   History of multiple abdominal surgeries including SBR, right hemicolectomy, and modified [place] Stoppa repair    PLAN:  No surgical intervention planned at this time  Correction of INR and treatment of SIMÓN per primary/nephrology  Okay for diet from surgical standpoint    Discussed with Dr. Pushpa Jason    Electronically signed by Arabella Franklin DO on 2/4/23 at 10:49 PM EST

## 2023-02-05 NOTE — H&P
Hospitalist History & Physical      PATIENT NAME:  Wes Julien    MRN:  [id number]  SERVICE DATE:  02/04/23    Primary Care Physician: Manda Rodriguez PA-C       SUBJECTIVE  CHIEF COMPLAINT:  had concerns including Altered Mental Status (Patient son found patient confused in the car and just not acting himself . Patient alert and oriented during triage stating \"I'm just tired\". Unknown LKW. Hx of diabetes . Takes coumadin at home). HPI:  Mr. Wes Julien, a 50y.o. year old male  who  has a past medical history of Accident, Acute thrombosis of inferior vena cava ([address] SIMÓN (acute kidney injury) ([address] Allergic rhinitis, Blister of left leg, Cellulitis of leg, left, Chronic back pain, Depression, Dermatophytosis, Diabetes mellitus ([address] Difficulty sleeping, Displacement of lumbar intervertebral disc without myelopathy, Fibromyalgia, Fractured rib, H/O seasonal allergies, Head injury, History of deep vein thrombosis, Hyperlipidemia, Hypertension, 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[address] Lymphedema of left leg, Obesity (BMI 35.0-39.9 without comorbidity), Osteoarthritis, Recurrent acute deep vein thrombosis (DVT) of left lower extremity ([address] S/P IVC filter, Subtherapeutic international normalized ratio (INR), and Thoracic or lumbosacral neuritis or radiculitis, unspecified. presents with fatigue, and lethargy, son states he is very tired last night, sleeping most of the day, no nausea or vomiting, no chest pain fever or chills, no limb weakness or tingling or headache or vision changes. Pt is blind in left eye.       PAST MEDICAL HISTORY:    Past Medical History:   Diagnosis Date    Accident 11/2019    stepped on nail rt ft about 1 month ago- healed per patient    Acute thrombosis of inferior vena cava ([address] 10/10/2020    SIMÓN (acute kidney injury) ([address] 2008 apx    kidney bruised due to fall / Glorine Royal    Allergic rhinitis     Blister of left leg 8/31/2021    Cellulitis of leg, left 8/31/2021 Chronic back pain     Depression     Dermatophytosis 8/31/2021    Diabetes mellitus ([address]     Difficulty sleeping     at times    Displacement of lumbar intervertebral disc without myelopathy     Fibromyalgia     Fractured rib     2008 / healed    H/O seasonal allergies     Head injury 1980'S apx    no residual s/s    History of deep vein thrombosis 8/31/2021    Hyperlipidemia     Hypertension     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 8/31/2021    Lymphedema of left leg 8/31/2021    Obesity (BMI 35.0-39.9 without comorbidity)     bmi 39.2  weight 296 #    Osteoarthritis     Recurrent acute deep vein thrombosis (DVT) of left lower extremity ([address] 8/31/2021    S/P IVC filter 8/31/2021    Subtherapeutic international normalized ratio (INR) 8/31/2021    Thoracic or lumbosacral neuritis or radiculitis, unspecified      PAST SURGICAL HISTORY:    Past Surgical History:   Procedure Laterality Date    COLONOSCOPY N/A 9/5/2020    COLONOSCOPY WITH BIOPSY performed by Paul Hodgkin, MD at [facility] ENDOSCOPY    CT [facility] NEW ACCESS  8/3/2020    CT [facility] NEW ACCESS 8/3/2020 [facility] CT    FOOT SURGERY Right 1985    to treat shattered bones    HERNIA REPAIR  2001    DOUBLE HERNIA    HERNIA REPAIR Right 12/9/2019    LAPAROSCOPIC RIGHT INGUINAL HERNIA REPAIR, MESH 10x15 cm PLACEMENT performed by Dina Florez MD at [place] [address] Left 4/11/2016    ILIAC ARTERY STENT INSERTION N/A 10/11/2020    S/P ILIAC STENT PLACEMENT VISUALIZATION performed by Saman Parks MD at [address] [place] [address] N/A 9/18/2020    LAPAROTOMY EXPLORATORY, CHOLECYSTECTOMY, BOWEL RESECTION, right darian colectomy, partial omentectomy, and splenectomy performed by Paul Hodgkin, MD at [facility] COLONOSCOPY FLX DX W/COLLIRENE Queen 1978 PFRMD N/A 5/7/2018    COLONOSCOPY DIAGNOSTIC performed by Cecelia Wilson MD at [address] Left 2014    Dr. Tyra Mercedes ARTHROSCOPY Left 11 21 14    SHOULDER SURGERY  2001 &2007    RIGHT AND LEFT repair of tears    THROMBECTOMY / EMBOLECTOMY FEMORAL Left 1/1/2021    LEFT LOWER EXTREMITY, VENOGRAM, FOLLOW UP POSSIBLE REMOVAL LYSIS CATHETER performed by Bere Fitzgerald MD at [address] / EMBOLECTOMY FEMORAL Left 1/2/2021    LEFT LOWER EXTREMITY VENOGRAM performed by Bere Fitzgerald MD at [address] Right 10/31/2016    Total right hip  Africa Pelaez MD    UPPER GASTROINTESTINAL ENDOSCOPY N/A 9/4/2020    EGD DIAGNOSTIC ONLY performed by Conny Leventhal, MD at [address] [facility] Left 1/3/2021    LEFT LOWER EXTREMITY VENOGRAM, PLACEMENT OF NEW LYSIS CATHETER performed by Bere Fitzgerald MD at [place] [address] N/A 5/12/2022    OPEN [address] performed by Barbi Rawls MD at [place] [address]  2007    LEFT WRIST     FAMILY HISTORY:    Family History   Problem Relation Age of Onset    Hypertension Mother     Arthritis Father     Diabetes Father     Cancer Maternal Grandfather         Skin    Cancer Paternal Uncle         skin    Diabetes Paternal Grandfather     Heart Disease Maternal Grandmother     Stroke Maternal Aunt      SOCIAL HISTORY:    Social History     Socioeconomic History    Marital status:      Spouse name: Not on file    Number of children: Not on file    Years of education: Not on file    Highest education level: Not on file   Occupational History    Occupation: disabled from     Tobacco Use    Smoking status: Never    Smokeless tobacco: Current     Types: Chew   Vaping Use    Vaping Use: Never used   Substance and Sexual Activity    Alcohol use: Not Currently     Alcohol/week: 0.0 standard drinks    Drug use: No    Sexual activity: Not Currently   Other Topics Concern    Not on file   Social History Narrative    Not on file     Social Determinants of Health     Financial Resource Strain: Not on file Food Insecurity: Not on file   Transportation Needs: Not on file   Physical Activity: Not on file   Stress: Not on file   Social Connections: Not on file   Intimate Partner Violence: Not on file   Housing Stability: Not on file    TOBACCO:   reports that he has never smoked. His smokeless tobacco use includes chew. ETOH:   reports that he does not currently use alcohol. MEDICATIONS:   Prior to Admission medications    Medication Sig Start Date End Date Taking? Authorizing Provider   fluticasone (FLONASE) 50 MCG/ACT nasal spray instill 1 spray into each nostril once daily IN THE EVENING 1/9/23   Shanika Burton PA-C   warfarin (COUMADIN) 5 MG tablet Take 1 tablet by mouth daily 11/18/22   Shanika Burton PA-C   gabapentin (NEURONTIN) 300 MG capsule Take 1 capsule by mouth 3 times daily for 180 days.  Intended supply: 30 days 11/2/22 5/1/23  Shanika Burton PA-C   metoprolol succinate (TOPROL XL) 50 MG extended release tablet Take 1 tablet by mouth daily 10/22/22   Fernandez Lynch MD   cholestyramine [place] [place]) 4 g packet Take 1 packet by mouth daily 10/22/22   Fernandez Lynch MD   insulin gl[facility]) 100 UNIT/ML injection vial Inject 7 Units into the skin nightly 10/21/22   Fernandez Lynch MD   insulin glargine (LANTUS) 100 UNIT/ML injection vial Inject 7 Units into the skin nightly    Historical Provider, MD   cholestyramine (QUESTRAN) 4 g packet Take 1 packet by mouth daily    Historical Provider, MD   lisinopril (PRINIVIL;ZESTRIL) 5 MG tablet take 1 tablet by mouth once daily 10/5/22   Shanika Burton PA-C   loperamide (IMODIUM) 2 MG capsule take 1 capsule by mouth four times a day if needed for diarrhea 9/6/22   Shanika Burton PA-C   Continuous Blood Gluc Sensor (FREESTYLE JOANIE 14 DAY SENSOR) MIS 1 Device by Does not apply route continuous 8/22/22   Shanika Burton PA-C   aspirin 81 MG chewable tablet Take 1 tablet by mouth daily 5/15/22   Kristin Staples MD ALLERGIES: Seasonal and Tramadol    REVIEW OF SYSTEM:   ROS as noted in HPI, 12 point ROS reviewed and otherwise negative. OBJECTIVE  PHYSICAL EXAM:   Vitals:    02/04/23 1752   BP: (!) 160/108   Pulse: 95   Resp: 18   Temp: 97.6 °F (36.4 °C)   SpO2: 96%       General appearance: alert, appears stated age and cooperative  CONSTITUTIONAL:  no apparent distress  ENT:  normocephalic, without obvious abnormality, atraumatic  NECK:  supple, symmetrical, trachea midline  Heart: regular rate and rhythm, S1, S2 normal   Lungs: clear to auscultation bilaterally  Abdomen: soft lax, not tender, not distended, positive bowel sounds  Extremities: extremities normal, atraumatic, no cyanosis, edema  Skin: Normal skin color. No rashes or lesions. Neurologic:  Neurovascularly intact without any focal sensory/motor deficits. Cranial nerves: II-XII intact, grossly non-focal.  Psychiatric: Alert and oriented, thought content appropriate, normal insight, flat affect      DATA:     Diagnostic tests reviewed for today's visit:    Most recent labs and imaging results reviewed.    Labs:   Recent Results (from the past 72 hour(s))   EKG 12 Lead    Collection Time: 02/04/23  5:56 PM   Result Value Ref Range    Ventricular Rate 88 BPM    Atrial Rate 88 BPM    P-R Interval 168 ms    QRS Duration 102 ms    Q-T Interval 340 ms    QTc Calculation (Bazett) 411 ms    P Axis 41 degrees    R Axis 46 degrees    T Axis 40 degrees   CBC with Auto Differential    Collection Time: 02/04/23  6:09 PM   Result Value Ref Range    WBC 11.3 4.5 - 11.5 E9/L    RBC 3.36 (L) 3.80 - 5.80 E12/L    Hemoglobin 10.8 (L) 12.5 - 16.5 g/dL    Hematocrit 32.2 (L) 37.0 - 54.0 %    MCV 95.8 80.0 - 99.9 fL    MCH 32.1 26.0 - 35.0 pg    MCHC 33.5 32.0 - 34.5 %    RDW 21.2 (H) 11.5 - 15.0 fL    Platelets 836 474 - 164 E9/L    MPV 9.2 7.0 - 12.0 fL    Neutrophils % 77.4 43.0 - 80.0 %    Lymphocytes % 9.5 (L) 20.0 - 42.0 %    Monocytes % 12.2 (H) 2.0 - 12.0 % Eosinophils % 0.9 0.0 - 6.0 %    Basophils % 0.8 0.0 - 2.0 %    Neutrophils Absolute 8.70 (H) 1.80 - 7.30 E9/L    Lymphocytes Absolute 1.13 (L) 1.50 - 4.00 E9/L    Monocytes Absolute 1.36 (H) 0.10 - 0.95 E9/L    Eosinophils Absolute 0.10 0.05 - 0.50 E9/L    Basophils Absolute 0.00 0.00 - 0.20 E9/L    Hypersegmented Neutrophils 1+     Anisocytosis 2+     Polychromasia 1+     Poikilocytes 2+     Schistocytes 1+     Acanthocytes 1+     [place] Cells 1+     Ovalocytes 2+     Target Cells 1+    Comprehensive Metabolic Panel w/ Reflex to MG    Collection Time: 02/04/23  6:09 PM   Result Value Ref Range    Sodium 135 132 - 146 mmol/L    Potassium reflex Magnesium 5.3 (H) 3.5 - 5.0 mmol/L    Chloride 101 98 - 107 mmol/L    CO2 19 (L) 22 - 29 mmol/L    Anion Gap 15 7 - 16 mmol/L    Glucose 162 (H) 74 - 99 mg/dL    BUN 59 (H) 6 - 20 mg/dL    Creatinine 3.9 (H) 0.7 - 1.2 mg/dL    Est, Glom Filt Rate 18 >=60 mL/min/1.73    Calcium 8.9 8.6 - 10.2 mg/dL    Total Protein 6.9 6.4 - 8.3 g/dL    Albumin 4.2 3.5 - 5.2 g/dL    Total Bilirubin <0.2 0.0 - 1.2 mg/dL    Alkaline Phosphatase 106 40 - 129 U/L    ALT 37 0 - 40 U/L    AST 55 (H) 0 - 39 U/L   Troponin    Collection Time: 02/04/23  6:09 PM   Result Value Ref Range    Troponin, High Sensitivity 85 (H) 0 - 11 ng/L   Brain Natriuretic Peptide    Collection Time: 02/04/23  6:09 PM   Result Value Ref Range    Pro-BNP 1,329 (H) 0 - 125 pg/mL   Serum Drug Screen    Collection Time: 02/04/23  6:09 PM   Result Value Ref Range    Ethanol Lvl <10 mg/dL    Acetaminophen Level <5.0 (L) 10.0 - 56.2 mcg/mL    Salicylate, Serum <7.5 0.0 - 30.0 mg/dL    TCA Scrn NEGATIVE Cutoff:300 ng/mL   Ammonia    Collection Time: 02/04/23  6:09 PM   Result Value Ref Range    Ammonia 22.0 16.0 - 60.0 umol/L   Protime-INR    Collection Time: 02/04/23  6:09 PM   Result Value Ref Range    Protime 65.8 (H) 9.3 - 12.4 sec    INR 6.1 (HH)    COVID-19, Rapid    Collection Time: 02/04/23  6:09 PM    Specimen: Nasopharyngeal Swab   Result Value Ref Range    SARS-CoV-2, NAAT Not Detected Not Detected   RAPID INFLUENZA A/B ANTIGENS    Collection Time: 02/04/23  6:09 PM    Specimen: Nasopharyngeal   Result Value Ref Range    Influenza A by PCR Not Detected Not Detected    Influenza B by PCR Not Detected Not Detected   Lactate, Sepsis    Collection Time: 02/04/23  6:09 PM   Result Value Ref Range    Lactic Acid, Sepsis 1.1 0.5 - 1.9 mmol/L     Oupatient labs:  Lab Results   Component Value Date    CHOL 197 08/19/2022    TRIG 202 (H) 08/19/2022    HDL 41 08/19/2022    LDLCALC 116 (H) 08/19/2022    TSH 0.965 09/20/2021    INR 6.1 (HH) 02/04/2023    LABA1C 6.1 08/19/2022       Urinalysis:    Lab Results   Component Value Date/Time    NITRU Negative 04/23/2021 11:29 PM    [address] NONE 04/23/2021 11:29 PM    BACTERIA RARE 04/23/2021 11:29 PM    RBCUA 0-1 04/23/2021 11:29 PM    BLOODU trace 02/14/2022 02:10 PM    BLOODU Negative 04/23/2021 11:29 PM    SPECGRAV >=1.030 02/14/2022 02:10 PM    SPECGRAV >=1.030 04/23/2021 11:29 PM    GLUCOSEU neg 02/14/2022 02:10 PM    GLUCOSEU Negative 04/23/2021 11:29 PM       Imaging:  CT ABDOMEN PELVIS WO CONTRAST Additional Contrast? None    Result Date: 2/4/2023  EXAMINATION: CT OF THE ABDOMEN AND PELVIS WITHOUT CONTRAST 2/4/2023 7:22 pm TECHNIQUE: CT of the abdomen and pelvis was performed without the administration of intravenous contrast. Multiplanar reformatted images are provided for review. Automated exposure control, iterative reconstruction, and/or weight based adjustment of the mA/kV was utilized to reduce the radiation dose to as low as reasonably achievable.  COMPARISON: 4-22 HISTORY: ORDERING SYSTEM PROVIDED HISTORY: urinary obstruction TECHNOLOGIST PROVIDED HISTORY: Reason for exam:->urinary obstruction Additional Contrast?->None Decision Support Exception - unselect if not a suspected or confirmed emergency medical condition->Emergency Medical Condition (MA) What reading provider will be dictating this exam?->CRC FINDINGS: Lower Chest: No infiltrate or effusion in the visible lower chest. Organs: No acute noncontrast organ abnormality is identified. GI/Bowel:   Apparent right hemicolectomy. Constipation suggested Pelvis: There is ventral bladder wall thickening. Soft tissue indistinct mass area is ventral to this on the right. The evaluation is limited without contrast and due to beam hardening artifacts Peritoneum/Retroperitoneum:   Varices noted in the abdomen and pelvis. Left iliac venous stents and IVC, which is diminutive at the infrarenal location, filter are present. Bones/Soft Tissues:   Posterior subcutaneous fluid at the sacrococcygeal level asymmetric to the right may represent seroma, cannot exclude other etiologies such as abscess or hematoma, on the order of 6 cm     1. No urinary obstruction is seen 2. However there is anterior bladder wall thickening and ventral to this on the right ill-defined soft tissue mass area which is suspicious for hematoma partially involving rectus sheath although was also present previously to indicate recurrence or residual, or the possibility of other etiology 3. Posterior subcutaneous right paramidline apparent complex fluid collection RECOMMENDATIONS: Clinical correlation. IV contrasted exam may be helpful to further evaluate, or MRI, as clinically warranted     CT HEAD WO CONTRAST    Result Date: 2/4/2023  EXAMINATION: CT OF THE HEAD WITHOUT CONTRAST  2/4/2023 7:22 pm TECHNIQUE: CT of the head was performed without the administration of intravenous contrast. Automated exposure control, iterative reconstruction, and/or weight based adjustment of the mA/kV was utilized to reduce the radiation dose to as low as reasonably achievable. COMPARISON: None. HISTORY: ORDERING SYSTEM PROVIDED HISTORY: Evaluate intracranial abnormality TECHNOLOGIST PROVIDED HISTORY: Has a \"code stroke\" or \"stroke alert\" been called? ->No Reason for exam:->Evaluate intracranial abnormality Decision Support Exception - unselect if not a suspected or confirmed emergency medical condition->Emergency Medical Condition (MA) What reading provider will be dictating this exam?->CRC FINDINGS: BRAIN/VENTRICLES: There is no acute intracranial hemorrhage, mass effect or midline shift. No abnormal extra-axial fluid collection. The gray-white differentiation is maintained without evidence of an acute infarct. There is no evidence of hydrocephalus. ORBITS: The visualized portion of the orbits demonstrate no acute abnormality. SINUSES: The visualized paranasal sinuses and mastoid air cells demonstrate no acute abnormality. SOFT TISSUES/SKULL:  No acute abnormality of the visualized skull or soft tissues. No acute intracranial abnormality. XR CHEST PORTABLE    Result Date: 2/4/2023  EXAMINATION: ONE XRAY VIEW OF THE CHEST 2/4/2023 6:00 pm COMPARISON: September 8, 2021 HISTORY: ORDERING SYSTEM PROVIDED HISTORY: pna TECHNOLOGIST PROVIDED HISTORY: Reason for exam:->pna What reading provider will be dictating this exam?->CRC FINDINGS: Lungs are normally expanded. No infiltrates, consolidates or pleural effusions are seen. Heart has upper size. There is unremarkable appearance for the mediastinum. There is no perihilar vascular congestion. There is no pneumothorax the right or on the left. There is a right scoliotic curvature the thoracic spine with spondylosis. No acute cardiopulmonary process. CT HEAD WO CONTRAST   Final Result   No acute intracranial abnormality. CT ABDOMEN PELVIS WO CONTRAST Additional Contrast? None   Final Result   1. No urinary obstruction is seen   2.  However there is anterior bladder wall thickening and ventral to this on   the right ill-defined soft tissue mass area which is suspicious for hematoma   partially involving rectus sheath although was also present previously to   indicate recurrence or residual, or the possibility of other etiology   3. Posterior subcutaneous right paramidline apparent complex fluid collection      RECOMMENDATIONS:   Clinical correlation. IV contrasted exam may be helpful to further evaluate,   or MRI, as clinically warranted         XR CHEST PORTABLE   Final Result   No acute cardiopulmonary process. ASSESSMENT AND PLAN  Active Problems:    * No active hospital problems. *  Resolved Problems:    * No resolved hospital problems. *    - SIMÓN  - Hyperkalemia   - supratherapeutic INR  - suspected rectus sheath hematoma  - HTN  - T2DM  - Elevated troponin       Plan:  - admit to tele monitoring   - serial troponin and EKG  - check Echo  - Consult Cardiology for further recommendations   - hold off coumadin, trend INR   - serial h/h, type and screen  - consult general surgery, suspected rectus sheath hematoma , already requested in ER  - consult Nephrology, kayexalate, recheck BMP  - monitor I/o, IV fluids, avoid nephrotoxics and hold off home lisinopril   - fall precautions   - PT/OT  - accuchecks and ssi       VTE Prophylaxis: scd  DVT Prophylaxis: []Lovenox []Heparin []PCD [] [address] []Encouraged ambulation    Diet: No diet orders on file  Code Status: Prior  Surrogate decision maker confirmed with patient:  Primary Emergency Contact: Misa Bledsoe, [place] Phone: [phone]      Disposition: []Med/Surg [x] Intermediate [] ICU/CCU   Admit status: [] Observation [x] Inpatient       Additional work up or/and treatment plan may be added today or thereafter based on clinical progression. I am managing a portion of pt care. Some medical issues are handled by other specialists. Additional work up and treatment should be done by my colleague hospitalist and at out pt setting by pt PCP and other out pt providers.      Josiane Chapman MD  DATE: February 4, 2023

## 2023-02-05 NOTE — PROGRESS NOTES
Messaged Dr. Soriano Card regarding INR of  6.6 and protime 70.7. Awaiting call back. Message resent to Dr. Last Lala.

## 2023-02-05 NOTE — CONSULTS
[facility]  GENERAL SURGERY     ATTENDING PHYSICIAN PROGRESS NOTE   I have examined the patient, reviewed the record, and discussed the case with the APN/  Resident. I have reviewed all relevant labs and imaging data. Please refer to the  APN/ resident's note. I agree with the  assessment and plan with the following corrections/ additions. The following summarizes my clinical findings and independent assessment. CC: Chronic posterior rectus sheath density    HPI:  50 y.o. male who presented to the ED yesterday for lethargy and increased confusion. Patient was found to have a supratherapeutic INR of 6 as well as acute renal injury. Currently patient denies any abdominal pain. We were consulted based on a finding on the CT scan of the abdomen pelvis which could represent intra-abdominal hematoma. Patient has extensive surgical abdominal history including a right inguinal hernia repair in 2019, ex lap with small bowel resection, right hemicolectomy and cholecystectomy in 2020 a modified Beverlee Primrose retrorectus ventral incisional hernia repair in May 2022 with Dr. Mary Cleaning. 3 surgeries patient states that he is lost over 50 pounds    Past medical/surgical/family history: as noted above.   Medications/allergies: as noted above  Social History: as noted above    Review of Systems:  Review of Systems - History obtained from the patient  General ROS: Positive for lethargy  Psychological ROS: negative  Ophthalmic ROS: negative  Allergy and Immunology ROS: negative  Hematological and Lymphatic ROS: negative  Endocrine ROS: negative  Breast ROS: negative  Respiratory ROS: negative  Cardiovascular ROS: negative  Gastrointestinal ROS: Positive for incidental finding  Genito-Urinary ROS: Positive for renal injury  Musculoskeletal ROS: negative      Physical Exam:  /73   Pulse 84   Temp 97.5 °F (36.4 °C) (Temporal)   Resp 18   SpO2 93%     Vitals:    02/05/23 0740   BP: 105/73 Pulse: 84   Resp: 18   Temp: 97.5 °F (36.4 °C)   SpO2: 93%       PSYCH: mood and affect normal, alert and oriented x 3  CONSTITUTIONAL: No apparent distress, comfortable  EYES: Sclera white, pupils equal round and reactive to light  ENMT:  Hearing normal, trachea midline, ears externally intact  RESP: Breath sounds were clear and equal with no rales, wheezes, or rhonchi. Respiratory effort was normal with no retractions or use of accessory muscles. CV: Heart sounds were normal with a regular rate and rhythm. No pedal edema  GI/ Abdomen: The abdomen was soft and non distended. There was no tenderness, guarding, rebound, or rigidity. There was no                     masses, hepatosplenomegaly, or hernias. ASSESSMENT:  Principal Problem:    SIMÓN (acute kidney injury) ([address]  Resolved Problems:    * No resolved hospital problems. *       PLAN:  I have reviewed the prior progress note from the patient's internal medicine admit H&P from 2/4  . I have reviewed the progress note from the ED visit on 2/4. I have reviewed the following test results: INR admission of 6.1.,  CBC-white blood cell count 8.2 hemoglobin 10 platelets 068, BMP sodium 130 potassium 5.2 creatinine 2.2 drain    I have personally reviewed the CAT Scan imaging and my interpretation is fluid collection involving posterior rectus sheath    This posterior rectus sheath fluid collection is likely reactive from patient's retrorectus incisional hernia repair from May 2022. Mesh was placed in between the rectus sheath in order to fix this patient's hernia repair. I reviewed the patient's operative note from May 12, 2022-patient had a 10 x 15 inch mesh placed. Clinically, patient is afebrile. He states that he has no abdominal pain. He has no white blood cell count. This fluid collection is likely a chronic seroma.   At this point, I would not recommend sticking a needle or drain in this fluid collection since it will likely recur as now there is a chronic capsule. Also, sticking a needle in there will run the risk of infecting this chronic fluid collection. Currently, there is no evidence to suggest that he has an infection. We will cancel the IR drain ordered by the medical service. Milad Jameson MD, FACS  2/5/2023  1:57 PM    NOTE: This report was transcribed using voice recognition software. Every effort was made to ensure accuracy; however, inadvertent computerized transcription errors may be present.

## 2023-02-05 NOTE — PROGRESS NOTES
Hospitalist Progress Note      SYNOPSIS: Patient admitted on 2023 for SIMÓN (acute kidney injury) [facility])      SUBJECTIVE:    Patient seen and examined. He reports back pain in left mid to low back region. He did fall two days ago from stairs and landed on his back. Last coumadin was 2 days ago. Records reviewed. 61-year-old male past medical history of DVT status post IVC filter on Coumadin, insulin-dependent diabetes, legally blind left eye, hypertension, hyperlipidemia, thrombosis of IVC, fibromyalgia, morbid obesity presented to altered mental status. Found to have SIMÓN creatinine of 3.0. Also supratherapeutic INR on presentation of 6.1. Potassium was also elevated at 5.3. CO2 19 concerning for metabolic acidosis. Anion gap 15 not elevated. CT head was negative for any acute findings. CT abdomen showed no urinary obstruction. Bladder wall thickening and ventral to do so on the right with ill-defined soft tissue mass area suspicious for hematoma partially involving the rectus sheath on 2 L also present previously and also some concern for posterior subcutaneous right paramidline complex fluid collection. He was seen by general surgery in the ER recommended no intervention at this time. Hemoglobin 10.8 on presentation. Urinalysis negative for leukocyte esterase or nitrites. White blood cells 0-1 small blood. Nephrology has been consulted for acute kidney injury. Talk screen positive for fentanyl. EKG normal sinus rhythm. Stable overnight. No other overnight issues reported. Temp (24hrs), Av.5 °F (36.4 °C), Min:97.5 °F (36.4 °C), Max:97.6 °F (36.4 °C)    DIET: ADULT DIET;  Regular; 3 carb choices (45 gm/meal)  CODE: Full Code    Intake/Output Summary (Last 24 hours) at 2023 7391  Last data filed at 2023 0648  Gross per 24 hour   Intake --   Output 850 ml   Net -850 ml       OBJECTIVE:    /73   Pulse 84   Temp 97.5 °F (36.4 °C) (Temporal)   Resp 18   SpO2 93% General appearance: No apparent distress, appears stated age and cooperative. HEENT:  Conjunctivae/corneas clear. Neck: Supple. No jugular venous distention. Respiratory: Clear to auscultation bilaterally, normal respiratory effort  Cardiovascular: Regular rate rhythm, normal S1-S2  Abdomen: Soft, nontender, nondistended  Musculoskeletal: No clubbing, cyanosis, no bilateral lower extremity edema. Brisk capillary refill. Pain on palpation of left paraspinal muscle region  Skin:  No rashes  on visible skin  Neurologic: awake, alert and following commands     ASSESSMENT:  Metabolic encephalopathy  Supratherapeutic INR  Acute kidney injury  Abdominal/bladder wall hematoma present on previous imaging on April 2022  Posterior subcutaneous right paramidline apparent complex fluid collection  Insulin-dependent diabetes  History of DVT status post IVC filter and on Coumadin       PLAN:  Hold Coumadin for now. INR 6.6 we will go ahead and give2 mg of vitamin K given concern for hematoma risk of bleeding. Potassium 5.3 will give Ford Persian he did receive Kayexalate in the ER for potassium 5.4 with no improvement. Hold Coumadin. Continue IV hydration. General surgery consulted with further recommendation concerning hematoma at this time do like to monitor. Given concern for complex fluid collection we will have ID on board to see if this needs drained to rule out infection patient has no fever or leukocytosis. IR consulted for US drainage of complex fluid collection. Continue blood sugar control Sliding-scale for now hold home insulin regimen as blood sugar this morning was on the low side at 75.       DISPOSITION:     Medications:  REVIEWED DAILY    Infusion Medications    sodium chloride 75 mL/hr at 02/05/23 0017    dextrose      sodium chloride       Scheduled Medications    sodium zirconium cyclosilicate  10 g Oral Once    phytonadione (VITAMIN K) IVPB  2 mg IntraVENous Once    sodium chloride  1,000 mL IntraVENous Once    fluticasone  1 spray Each Nostril Daily    metoprolol succinate  50 mg Oral Daily    sodium chloride flush  10 mL IntraVENous 2 times per day    insulin lispro  0-4 Units SubCUTAneous TID WC    insulin lispro  0-4 Units SubCUTAneous Nightly     PRN Meds: glucose, dextrose bolus **OR** dextrose bolus, glucagon (rDNA), dextrose, sodium chloride flush, sodium chloride, ondansetron **OR** ondansetron, magnesium hydroxide, acetaminophen **OR** acetaminophen    Labs:     Recent Labs     02/04/23 1809 02/05/23  0008 02/05/23 0316   WBC 11.3  --  8.2   HGB 10.8* 10.0* 10.0*   HCT 32.2* 31.1* 30.1*     --  334       Recent Labs     02/04/23 1809 02/05/23 0316    137   K 5.3* 5.4*    106   CO2 19* 18*   BUN 59* 59*   CREATININE 3.9* 3.0*   CALCIUM 8.9 8.8       Recent Labs     02/04/23 1809 02/05/23 0316   PROT 6.9 6.5   ALKPHOS 106 96   ALT 37 33   AST 55* 45*   BILITOT <0.2 0.3       Recent Labs     02/04/23 1809 02/05/23  0322   INR 6.1* 6.6*       No results for input(s): CKTOTAL, TROPONINI in the last 72 hours. Chronic labs:    Lab Results   Component Value Date    CHOL 197 08/19/2022    TRIG 202 (H) 08/19/2022    HDL 41 08/19/2022    LDLCALC 116 (H) 08/19/2022    TSH 0.965 09/20/2021    INR 6.6 (HH) 02/05/2023    LABA1C 5.8 (H) 02/05/2023       Radiology: REVIEWED DAILY    +++++++++++++++++++++++++++++++++++++++++++++++++  Shawanda Lujan MD  [facility] Physician - [address] [facility], [place]  +++++++++++++++++++++++++++++++++++++++++++++++++  NOTE: This report was transcribed using voice recognition software. Every effort was made to ensure accuracy; however, inadvertent computerized transcription errors may be present.

## 2023-02-05 NOTE — PLAN OF CARE
Problem: Chronic Conditions and Co-morbidities  Goal: Patient's chronic conditions and co-morbidity symptoms are monitored and maintained or improved  Outcome: Progressing     Problem: Discharge Planning  Goal: Discharge to home or other facility with appropriate resources  Outcome: Progressing     Problem: Confusion  Goal: Confusion, delirium, dementia, or psychosis is improved or at baseline  Description: INTERVENTIONS:  1. Assess for possible contributors to thought disturbance, including medications, impaired vision or hearing, underlying metabolic abnormalities, dehydration, psychiatric diagnoses, and notify attending LIP  2. [place] high risk fall precautions, as indicated  3. Provide frequent short contacts to provide reality reorientation, refocusing and direction  4. Decrease environmental stimuli, including noise as appropriate  5. Monitor and intervene to maintain adequate nutrition, hydration, elimination, sleep and activity  6. If unable to ensure safety without constant attention obtain sitter and review sitter guidelines with assigned personnel  7.  Initiate Psychosocial CNS and Spiritual Care consult, as indicated  Outcome: Progressing     Problem: Safety - Adult  Goal: Free from fall injury  Outcome: Progressing

## 2023-02-05 NOTE — ED NOTES
Report called to [facility] with no questions at this time. Pt is sleeping but will awake to repeated verbal stimuli. When pt is awake he is alert and oriented x3.      Andrey Sargent RN  02/04/23 6848

## 2023-02-05 NOTE — CONSULTS
[address] CONSULT    Name: Taryn Pfeiffer II    Age: 50 y.o. Date of Admission: 2/4/2023  5:48 PM    Date of Service: 2/5/2023    Reason for Consultation: Elevated troponin    Referring Physician: Bob Rodriguez MD    History of Present Illness:  Alejandrina Munoz is a 50 y.o. male (known to Dr. Marisol Guzman) who presented on 2/4/2023 for further evaluation of altered mental status and generalized weakness for > 24 hours. His PMH is outlined in detail below (see A/P below). He is a sub-optimal historian. +decreased functional capacity per patient. He denies recent chest pain, SOB, palpitations, lightheadedness, dizziness, or syncope. No history of PND or orthopnea. He is currently with no active cardiac complaints at rest. SR on EKG and telemetry.        Review of Systems:   Cardiac: As per HPI  General: No fever, chills  Pulmonary: As per HPI  HEENT: No visual disturbances, difficult swallowing  GI: No nausea, vomiting  : No dysuria, hematuria  Endocrine: No thyroid disease, +DM  Musculoskeletal: FRANKLIN x 4, history of right arm injury  Skin: Intact, no rashes  Neuro: No headache, seizures  Psych: Currently with no depression, anxiety    Past Medical History:  Past Medical History:   Diagnosis Date    Accident 11/2019    stepped on nail rt ft about 1 month ago- healed per patient    Acute thrombosis of inferior vena cava ([address] 10/10/2020    SIMÓN (acute kidney injury) ([address] 2008 apx    kidney bruised due to fall / Amber Riedel    Allergic rhinitis     Blister of left leg 8/31/2021    Cellulitis of leg, left 8/31/2021    Chronic back pain     Depression     Dermatophytosis 8/31/2021    Diabetes mellitus ([address]     Difficulty sleeping     at times    Displacement of lumbar intervertebral disc without myelopathy     Fibromyalgia     Fractured rib     2008 / healed    H/O seasonal allergies     Head injury 1980'S apx    no residual s/s    History of deep vein thrombosis 8/31/2021    Hyperlipidemia Hypertension     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 NEW ACCESS  8/3/2020    CT PTC NEW ACCESS 8/3/2020 [facility] CT    FOOT SURGERY Right 1985    to treat shattered bones    HERNIA REPAIR  2001    DOUBLE HERNIA    HERNIA REPAIR Right 12/9/2019    LAPAROSCOPIC RIGHT INGUINAL HERNIA REPAIR, MESH 10x15 cm PLACEMENT performed by Miranda Julio MD at [place] [address] Left 4/11/2016    ILIAC ARTERY STENT INSERTION N/A 10/11/2020    S/P ILIAC STENT PLACEMENT VISUALIZATION performed by Ej Alonso MD at [address] N/A 9/18/2020    LAPAROTOMY EXPLORATORY, CHOLECYSTECTOMY, BOWEL RESECTION, right darian colectomy, partial omentectomy, and splenectomy performed by Kyrie Ritter MD at [facility] FLX DX W/COLLIRENE [address] 1978 PFRMD N/A 5/7/2018    COLONOSCOPY DIAGNOSTIC performed by Mariano Servin MD at [address] Left 2014    Dr. Betsy Gonzalez ARTHROSCOPY Left 11 21 14    SHOULDER SURGERY  2001 &2007    RIGHT AND LEFT repair of tears    THROMBECTOMY / EMBOLECTOMY FEMORAL Left 1/1/2021    LEFT LOWER EXTREMITY, VENOGRAM, FOLLOW UP POSSIBLE REMOVAL LYSIS CATHETER performed by Ej Alonso MD at [facility] ([place], [place]) / EMBOLECTOMY FEMORAL Left 1/2/2021    LEFT LOWER EXTREMITY VENOGRAM performed by Ej Alonso MD at [address] [address] ARTHROPLASTY Right 10/31/2016    Total right hip  Alanna Yousif MD    UPPER GASTROINTESTINAL ENDOSCOPY N/A 9/4/2020    EGD DIAGNOSTIC ONLY performed by Cathleen Samano MD at [address] Left 1/3/2021    LEFT LOWER EXTREMITY VENOGRAM, PLACEMENT OF NEW LYSIS CATHETER performed by Rosanne Plummer MD at [place] [address] N/A 5/12/2022    OPEN INCISIONAL HERNIA REPAIR WITH MESH performed by Brannon Antunez MD at [place] [address]  2007    LEFT WRIST       Family History:  Family History   Problem Relation Age of Onset    Hypertension Mother     Arthritis Father     Diabetes Father     Cancer Maternal Grandfather         Skin    Cancer Paternal Uncle         skin    Diabetes Paternal Grandfather     Heart Disease Maternal Grandmother     Stroke Maternal Aunt        Social History:  Social History     Socioeconomic History    Marital status:      Spouse name: Not on file    Number of children: Not on file    Years of education: Not on file    Highest education level: Not on file   Occupational History    Occupation: disabled from     Tobacco Use    Smoking status: Never    Smokeless tobacco: Current     Types: Chew   Vaping Use    Vaping Use: Never used   Substance and Sexual Activity    Alcohol use: Not Currently     Alcohol/week: 0.0 standard drinks    Drug use: No    Sexual activity: Not Currently   Other Topics Concern    Not on file   Social History Narrative    Not on file     Social Determinants of Health     Financial Resource Strain: Not on file   Food Insecurity: Not on file   Transportation Needs: Not on file   Physical Activity: Not on file   Stress: Not on file   Social Connections: Not on file   Intimate Partner Violence: Not on file   Housing Stability: Not on file       Allergies:   Allergies   Allergen Reactions    Seasonal      HAYFEVER / sneezing,watery eyes    Tramadol Itching       Home Medications:  Prior to Admission medications    Medication Sig Start Date End Date Taking? Authorizing Provider   fluticasone (FLONASE) 50 MCG/ACT nasal spray instill 1 spray into each nostril once daily IN THE EVENING 1/9/23   Maria Dolores Ferro PA-C   warfarin (COUMADIN) 5 MG tablet Take 1 tablet by mouth daily 11/18/22   Maria Dolores Ferro PA-C   gabapentin (NEURONTIN) 300 MG capsule Take 1 capsule by mouth 3 times daily for 180 days.  Intended supply: 30 days 11/2/22 5/1/23  Maria Dolores Ferro PA-C   metoprolol succinate (TOPROL XL) 50 MG extended release tablet Take 1 tablet by mouth daily 10/22/22   Irena Underwood MD   cholestyramine Giovana Cecilia) 4 g packet Take 1 packet by mouth daily 10/22/22   Irena Underwood MD   insulin glargine-[facility]) 100 UNIT/ML injection vial Inject 7 Units into the skin nightly 10/21/22   Irena Underwood MD   insulin glargine (LANTUS) 100 UNIT/ML injection vial Inject 7 Units into the skin nightly    Historical Provider, MD   cholestyramine (QUESTRAN) 4 g packet Take 1 packet by mouth daily    Historical Provider, MD   lisinopril (PRINIVIL;ZESTRIL) 5 MG tablet take 1 tablet by mouth once daily 10/5/22   Maria Dolores Ferro PA-C   loperamide (IMODIUM) 2 MG capsule take 1 capsule by mouth four times a day if needed for diarrhea 9/6/22   Maria Dolores Ferro PA-C   Continuous Blood Gluc Sensor (FREESTYLE JOANIE 14 DAY SENSOR) [facility] 1 Device by Does not apply route continuous 8/22/22   Maria Dolores Ferro PA-C   aspirin 81 MG chewable tablet Take 1 tablet by mouth daily 5/15/22   Nancy Deng MD       Current Medications:  Current Facility-Administered Medications   Medication Dose Route Frequency Provider Last Rate Last Admin    [Held by provider] aspirin chewable tablet 81 mg  81 mg Oral Daily Telly Huang MD        cholestyramine Giovana Cecilia) packet 4 g  1 packet Oral Daily Telly Huang MD        [Held by provider] insulin glargine (LANTUS) injection vial 7 Units  7 Units SubCUTAneous Nightly Telly Huang MD        gabapentin (NEURONTIN) capsule 300 mg  300 mg Oral Daily Telly Huang MD   300 mg at 02/05/23 1248    0.9 % sodium chloride infusion   IntraVENous Continuous Zelpha Jest, APRN - CNP        0.9 % sodium chloride bolus  1,000 mL IntraVENous Once Angelita Mandel MD        fluticasone (FLONASE) 50 MCG/ACT nasal spray 1 spray  1 spray Each Nostril Daily Kannan Limon MD        metoprolol succinate (TOPROL XL) extended release tablet 50 mg  50 mg Oral Daily Samebrian Limon MD   50 mg at 02/05/23 1013    glucose chewable tablet 16 g  4 tablet Oral PRN Vivian Mcbride MD        dextrose bolus 10% 125 mL  125 mL IntraVENous PRN Vivian Mcbride MD        Or    dextrose bolus 10% 250 mL  250 mL IntraVENous PRN Vivian Mcbride MD        glucagon injection 1 mg  1 mg SubCUTAneous PRN Vivian Mcbride MD        dextrose 10 % infusion   IntraVENous Continuous PRN Vivian Mcbride MD        sodium chloride flush 0.9 % injection 10 mL  10 mL IntraVENous 2 times per day Kannan Limon MD   10 mL at 02/05/23 1043    sodium chloride flush 0.9 % injection 10 mL  10 mL IntraVENous PRN Kannan Limon MD        0.9 % sodium chloride infusion   IntraVENous PRN Kannan Limon MD        ondansetron (ZOFRAN-ODT) disintegrating tablet 4 mg  4 mg Oral Q8H PRN Kannan Limon MD        Or    ondansetron (ZOFRAN) injection 4 mg  4 mg IntraVENous Q6H PRN Kannan Limon MD        magnesium hydroxide (MILK OF MAGNESIA) 400 MG/5ML suspension 30 mL  30 mL Oral Daily PRN Vivian Mcbride MD        acetaminophen (TYLENOL) tablet 650 mg  650 mg Oral Q6H PRN Kannan Limon MD        Or    acetaminophen (TYLENOL) suppository 650 mg  650 mg Rectal Q6H PRN Kannan Limon MD        insulin lispro (HUMALOG) injection vial 0-4 Units  0-4 Units SubCUTAneous TID WC Kannan Limon MD        insulin lispro (HUMALOG) injection vial 0-4 Units  0-4 Units SubCUTAneous Nightly Vivian Mcbride MD sodium chloride      dextrose      sodium chloride         Physical Exam:  /73   Pulse 84   Temp 97.5 °F (36.4 °C) (Temporal)   Resp 18   SpO2 93%   Wt Readings from Last 3 Encounters:   10/21/22 273 lb 4.8 oz (124 kg)   08/19/22 295 lb (133.8 kg)   05/31/22 (!) 308 lb (139.7 kg)     Appearance: Awake, alert, no acute respiratory distress  Skin: Intact, no rash  Head: Normocephalic, atraumatic  Eyes: EOMI, no conjunctival erythema  ENMT: No pharyngeal erythema, MMM, no rhinorrhea  Neck: Supple, no carotid bruits  Lungs: Decreased BS B/L, no wheezing  Cardiac: Regular rate and rhythm, +S1S2, no murmurs apparent  Abdomen: Soft, nontender, +bowel sounds  Extremities: Moves all extremities x 4, no lower extremity edema  Neurologic: Awake, alert, normal mood    Intake/Output:    Intake/Output Summary (Last 24 hours) at 2/5/2023 1353  Last data filed at 2/5/2023 0648  Gross per 24 hour   Intake --   Output 850 ml   Net -850 ml     No intake/output data recorded.     Laboratory Tests:  Recent Labs     02/04/23 1809 02/05/23 0316 02/05/23  1214    137 138   K 5.3* 5.4* 5.2*    106 109*   CO2 19* 18* 19*   BUN 59* 59* 49*   CREATININE 3.9* 3.0* 2.2*   GLUCOSE 162* 75 117*   CALCIUM 8.9 8.8 8.7     Lab Results   Component Value Date/Time    MG 2.0 10/14/2022 08:42 PM     Recent Labs     02/04/23 1809 02/05/23  0316   ALKPHOS 106 96   ALT 37 33   AST 55* 45*   PROT 6.9 6.5   BILITOT <0.2 0.3   LABALBU 4.2 3.9     Recent Labs     02/04/23 1809 02/05/23  0008 02/05/23 0316 02/05/23  1214   WBC 11.3  --  8.2  --    RBC 3.36*  --  3.13*  --    HGB 10.8* 10.0* 10.0* 10.1*   HCT 32.2* 31.1* 30.1* 30.1*   MCV 95.8  --  96.2  --    MCH 32.1  --  31.9  --    MCHC 33.5  --  33.2  --    RDW 21.2*  --  21.4*  --      --  334  --    MPV 9.2  --  8.8  --      Lab Results   Component Value Date    CKTOTAL 638 (H) 10/08/2021    CKMB 1.4 11/26/2013    TROPONINI <0.01 04/23/2021    TROPONINI <0.01 12/30/2020    TROPONINI <0.01 11/18/2020     Recent Labs     02/04/23  1809 02/05/23  0008 02/05/23  0322 02/05/23  0909   TROPHS 85* 67* 64* 47*     Lab Results   Component Value Date    INR 6.3 (HH) 02/05/2023    INR 6.6 (HH) 02/05/2023    INR 6.1 (HH) 02/04/2023    PROTIME 67.7 (H) 02/05/2023    PROTIME 70.7 (H) 02/05/2023    PROTIME 65.8 (H) 02/04/2023     Lab Results   Component Value Date    TSH 0.965 09/20/2021     Lab Results   Component Value Date    LABA1C 5.7 (H) 02/05/2023     No results found for: EAG  Lab Results   Component Value Date    CHOL 197 08/19/2022    CHOL 149 09/20/2021    CHOL 154 03/29/2021     Lab Results   Component Value Date    TRIG 202 (H) 08/19/2022    TRIG 155 (H) 09/20/2021    TRIG 202 (H) 03/29/2021     Lab Results   Component Value Date    HDL 41 08/19/2022    HDL 48 09/20/2021    HDL 38 03/29/2021     Lab Results   Component Value Date    LDLCALC 116 (H) 08/19/2022    LDLCALC 70 09/20/2021    LDLCALC 76 03/29/2021     Lab Results   Component Value Date    LABVLDL 40 08/19/2022    LABVLDL 31 09/20/2021    LABVLDL 40 03/29/2021     No results found for: CHOLHDLRATIO  Recent Labs     02/04/23  1809 02/05/23 0322   PROBNP 1,329* 1,182*     Lab Results   Component Value Date    CRP 5.2 (H) 10/14/2022     Lab Results   Component Value Date    SEDRATE 49 (H) 10/14/2022       Cardiac Tests:  EKG personally reviewed: SR, rate 88, NSSTT changes, artifact present    Telemetry personally reviewed (date: 2/5/2023): SR, rate 80's    Echocardiogram reviewed: 8/3/2020 (Dr. Kaushik Freeman)   Technically difficult study - limited visualization. Micro-bubble contrast injected to enhance left ventricular visualization. Normal left ventricular size and systolic function. Ejection fraction is visually estimated at 60-65%. Normal diastolic function. No regional wall motion abnormalities seen. Mild left ventricular concentric hypertrophy noted.    Abnormal LV septal motion consistent with conduction disorder. Normal right ventricular size and function. No significant valvular abnormalities. Echocardiogram: 9/5/21 (Dr. Kaushik Freeman)   Normal left ventricular size and systolic function. Ejection fraction is visually estimated at 60-65%. Normal diastolic function. No regional wall motion abnormalities seen. Normal left ventricular wall thickness. Normal right ventricular size and function. No significant valvular abnormalities. No valvular vegetations seen. No echocardiographic evidence of endocarditis. CXR: 2/4/23  No acute cardiopulmonary process. CT abdomen/pelvis: 2/4/23  1. No urinary obstruction is seen   2. However there is anterior bladder wall thickening and ventral to this on   the right ill-defined soft tissue mass area which is suspicious for hematoma   partially involving rectus sheath although was also present previously to   indicate recurrence or residual, or the possibility of other etiology   3.  Posterior subcutaneous right paramidline apparent complex fluid collection       ASSESSMENT / PLAN:  Presentation for altered mental status and generalized weakness  Abnormal CT abdomen/pelvis (see results above)  Elevated hs-troponin (85 --> 67 --> 64 --> 47) -- in the setting of the below co-morbidities, no chest pain  Anemia -- Hgb 12.2 --> --> 10.8 --> 10.0 --> 10.1  SIMÓN -- Cr 0.9 --> --> Cr 3.9 --> 3.0 --> 2.2  Hyperkalemia -- K 5.3 --> 5.4 --> 5.2  Chronic OAC with coumadin / supra-therapeutic INR (6.1 --> 6.6 --> 6.3)  History of recurrent DVT/PE/VTE, status post IVC filter and anticoagulated with coumadin prior to admission  Prior GI bleed s/p right hemicolectomy, cholecystectomy, small bowel resection, ileocolonic anastomosis, splenectomy and omentectomy  Prior splenic infarction/splenic vein thrombosis  Hypertension -- controlled  DM -- Hgb A1c 5.7  Hyperlipidemia  BMI 36.1  History of right arm injury/right wrist drop    - Repeat echocardiogram (reassess ventricular function)  - Hold ACE-I  - Hold coumadin / coagulopathy reversed by primary service / follow-up repeat INR  - Continue current medications (including Toprol XL)  - Aggressive risk factor modifications  - Monitor labs  - Care per other consultants    Thank you for allowing me to participate in your patient's care. Please feel free to contact me if you have any questions or concerns.     Hernan Ferreira MD  [facility] ([facility]) Cardiology

## 2023-02-05 NOTE — ED NOTES
Pt labs collected and sent. Pt understands that urine specimen is needed, but unable to obtain at this time. Will notify nurse when able to urinate.      Lesley Curiel RN  02/04/23 2295

## 2023-02-05 NOTE — CONSULTS
[facility] Roberto A. Caryle Berlin, MD Alverta Matter, MD Madelyn Numbers, MD Lavenia Loffler, CNP Crystal Lambling, NAHOMY Herrera CNP  Consultation  Patient's Name: Martha Haji II  4:56 PM  2/5/2023    Nephrologist: Jefferson Cardenas MD    Reason for Consult:  Acute kidney injury   Requesting Physician:  Estephania Betancourt MD      History Obtained From: Patient, chart    History of Present Ilness:         Mr. Emily Saravia is a 50year old gentleman who presented to the emergency room due to lethargy and confusion. He is alert and oriented, though a poor historian. He apparently went to dinner with his son and was not acting normal and was noted to be shaky and lethargic. His son thought maybe his blood sugar was low and attempted to get him to eat, but could not do so. He then called EMS. Upon arrival to the emergency room he was found to have acute kidney injury. Chest xray and CT of the head were unremarkable for any acute processes. 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 He is on coumadin for chronic anticoagulation. We were consulted for acute kidney injury. His creatinine upon arrival to the emergency room was 3.9 mg/dL and his BUN was 59. His potassium was also elevated at 5.3. He denies a prior history of kidney disease. Baseline creatinine is 0.9 mg/dL. He tells me that he thought his appetite was decent prior to hospitalization. He does admit to weakness and fatigue as of recently. He denies diarrhea, nausea, or vomiting. He denies chest pain, or palpitations. He tells me that he is dyspneic with exertion at all times. He denies dyspnea at rest. He does respond appropriately to questions, but remains somnolent throughout examination.      Past Medical History:   Diagnosis Date    Accident 11/2019    stepped on nail rt ft about 1 month ago- healed per patient    Acute thrombosis of inferior vena cava ([address] 10/10/2020    SIMÓN (acute kidney injury) ([address] 2008 apx    kidney bruised due to fall / Sunday Xin    Allergic rhinitis     Blister of left leg 8/31/2021    Cellulitis of leg, left 8/31/2021    Chronic back pain     Depression     Dermatophytosis 8/31/2021    Diabetes mellitus ([address]     Difficulty sleeping     at times    Displacement of lumbar intervertebral disc without myelopathy     Fibromyalgia     Fractured rib     2008 / healed    H/O seasonal allergies     Head injury 1980'S apx    no residual s/s    History of deep vein thrombosis 8/31/2021    Hyperlipidemia     Hypertension     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    Lymphedema of left leg 8/31/2021    Obesity (BMI 35.0-39.9 without comorbidity)     bmi 39.2  weight 296 #    Osteoarthritis     Recurrent acute deep vein thrombosis (DVT) of left lower extremity ([address] 8/31/2021    S/P IVC filter 8/31/2021    Subtherapeutic international normalized ratio (INR) 8/31/2021    Thoracic or lumbosacral neuritis or radiculitis, unspecified        Past Surgical History:   Procedure Laterality Date    COLONOSCOPY N/A 9/5/2020    COLONOSCOPY WITH BIOPSY performed by Kyrie Ritter MD at [facility] ENDOSCOPY    CT  NEW ACCESS  8/3/2020    CT PTC NEW ACCESS 8/3/2020 [facility] CT    FOOT SURGERY Right 1985    to treat shattered bones    HERNIA REPAIR  2001    DOUBLE HERNIA    HERNIA REPAIR Right 12/9/2019    LAPAROSCOPIC RIGHT INGUINAL HERNIA REPAIR, MESH 10x15 cm PLACEMENT performed by Miranda Julio MD at [place] [address] Left 4/11/2016    ILIAC ARTERY STENT INSERTION N/A 10/11/2020    S/P ILIAC STENT PLACEMENT VISUALIZATION performed by Ej Alonso MD at [address] [place] [address] N/A 9/18/2020    LAPAROTOMY EXPLORATORY, CHOLECYSTECTOMY, BOWEL RESECTION, right darian colectomy, partial omentectomy, and splenectomy performed by Kyrie Ritter MD at [facility] COLONOSCOPY FLX DX W/COLLJ SPEC WHEN PFRMD N/A 5/7/2018    COLONOSCOPY DIAGNOSTIC performed by Philip Blank MD at [address] Left 2014    Dr. Faiza Pinzon ARTHROSCOPY Left [address]  2001 &2007    RIGHT AND LEFT repair of tears    THROMBECTOMY / EMBOLECTOMY FEMORAL Left 1/1/2021    LEFT LOWER EXTREMITY, VENOGRAM, FOLLOW UP POSSIBLE REMOVAL LYSIS CATHETER performed by Мария Whitt MD at [address] / EMBOLECTOMY FEMORAL Left 1/2/2021    LEFT LOWER EXTREMITY VENOGRAM performed by Мария Whitt MD at [address] Right 10/31/2016    Total right hip  Sissy Montoya MD    UPPER GASTROINTESTINAL ENDOSCOPY N/A 9/4/2020    EGD DIAGNOSTIC ONLY performed by Katherin Lantigua MD at [address] Left 1/3/2021    LEFT LOWER EXTREMITY VENOGRAM, PLACEMENT OF NEW LYSIS CATHETER performed by Мария Whitt MD at [facility] N/A 5/12/2022    OPEN INCISIONAL [address] performed by Nora Fairchild MD at [place] [address]  2007    LEFT WRIST       Family History   Problem Relation Age of Onset    Hypertension Mother     Arthritis Father     Diabetes Father     Cancer Maternal Grandfather         Skin    Cancer Paternal Uncle         skin    Diabetes Paternal Grandfather     Heart Disease Maternal Grandmother     Stroke Maternal Aunt         reports that he has never smoked. His smokeless tobacco use includes chew. He reports that he does not currently use alcohol. He reports that he does not use drugs.     Allergies:  Seasonal and Tramadol    Current Medications:    [Held by provider] aspirin chewable tablet 81 mg, Daily  cholestyramine (QUESTRAN) packet 4 g, Daily  [Held by provider] insulin glargine (LANTUS) injection vial 7 Units, Nightly  gabapentin (NEURONTIN) capsule 300 mg, Daily  0.9 % sodium chloride infusion, Continuous  0.9 % sodium chloride bolus, Once  fluticasone (FLONASE) 50 MCG/ACT nasal spray 1 spray, Daily  metoprolol succinate (TOPROL XL) extended release tablet 50 mg, Daily  glucose chewable tablet 16 g, PRN  dextrose bolus 10% 125 mL, PRN   Or  dextrose bolus 10% 250 mL, PRN  glucagon injection 1 mg, PRN  dextrose 10 % infusion, Continuous PRN  sodium chloride flush 0.9 % injection 10 mL, 2 times per day  sodium chloride flush 0.9 % injection 10 mL, PRN  0.9 % sodium chloride infusion, PRN  ondansetron (ZOFRAN-ODT) disintegrating tablet 4 mg, Q8H PRN   Or  ondansetron (ZOFRAN) injection 4 mg, Q6H PRN  magnesium hydroxide (MILK OF MAGNESIA) 400 MG/5ML suspension 30 mL, Daily PRN  acetaminophen (TYLENOL) tablet 650 mg, Q6H PRN   Or  acetaminophen (TYLENOL) suppository 650 mg, Q6H PRN  insulin lispro (HUMALOG) injection vial 0-4 Units, TID WC  insulin lispro (HUMALOG) injection vial 0-4 Units, Nightly        Review of Systems:   Constitutional: negative for chills, + fatigue, denies fevers, and malaise  Eyes: negative for icterus, irritation, redness, and visual disturbance  Ears, nose, mouth, throat, and face: negative for ear drainage, earaches, hearing loss, nasal congestion, sore mouth, sore throat, and tinnitus  Respiratory: negative for cough, +dyspnea on exertion, +shortness of breath, and denies wheezing  Cardiovascular: negative for chest pain, chest pressure/discomfort, + dyspnea, and denies palpitations  Gastrointestinal: negative for abdominal pain, diarrhea, melena, nausea, and vomiting  Genitourinary: negative for dysuria, frequency, and hematuria  Integument/breast: negative for pruritus, rash, and skin lesion(s)  Hematologic/lymphatic: negative for bleeding, easy bruising, and petechiae  Musculoskeletal: negative for arthralgias, muscle weakness, myalgias, and stiff joints  Neurological: negative for dizziness, headaches, vertigo, and weakness  Behavioral/Psych: negative for anxiety, depression, and irritability    Physical exam:  General Appearance: awake, alert, oriented, in no acute distress, lethargic   Skin:  Skin color, texture, turgor normal. No rashes or lesions. Eyes:  PERRL, EOMI, Sclera nonicteric, and Conjunctiva clear  Ears:  canals and TMs intact  Nose/Sinuses:  Nares normal. Septum midline. Mucosa normal. No drainage or sinus tenderness. Mouth/Throat:  Mucosa moist.  No lesions. Neck:  neck- supple, no mass, non-tender  Lungs:  Normal expansion. Clear to auscultation, diminished in bases. No rales, rhonchi, or wheezing. Heart:   Regular rate and rhythm without murmur, gallop or rub. Abdomen:  Soft, non-tender, normal bowel sounds. No bruits, organomegaly or masses. Extremities: Extremities warm to touch, pink, with no edema. Musculoskeletal:  No joint swelling, deformity, or tenderness. Peripheral Pulses:  Normal  Neurologic: Sensation grossly intact.         Data:   Labs:  CBC with Differential:    Lab Results   Component Value Date/Time    WBC 8.2 02/05/2023 03:16 AM    RBC 3.13 02/05/2023 03:16 AM    HGB 10.1 02/05/2023 12:14 PM    HCT 30.1 02/05/2023 12:14 PM     02/05/2023 03:16 AM    MCV 96.2 02/05/2023 03:16 AM    MCH 31.9 02/05/2023 03:16 AM    MCHC 33.2 02/05/2023 03:16 AM    RDW 21.4 02/05/2023 03:16 AM    NRBC 0.9 11/19/2020 06:38 AM    SEGSPCT 80 11/26/2013 01:45 PM    LYMPHOPCT 29.9 02/05/2023 03:16 AM    MONOPCT 10.6 02/05/2023 03:16 AM    BASOPCT 0.9 02/05/2023 03:16 AM    MONOSABS 0.87 02/05/2023 03:16 AM    LYMPHSABS 2.46 02/05/2023 03:16 AM    EOSABS 0.32 02/05/2023 03:16 AM    BASOSABS 0.07 02/05/2023 03:16 AM     CMP:    Lab Results   Component Value Date/Time     02/05/2023 12:14 PM    K 5.2 02/05/2023 12:14 PM    K 5.4 02/05/2023 03:16 AM     02/05/2023 12:14 PM    CO2 19 02/05/2023 12:14 PM    BUN 49 02/05/2023 12:14 PM    CREATININE 2.2 02/05/2023 12:14 PM    GFRAA >60 10/17/2022 06:36 AM    LABGLOM 36 02/05/2023 12:14 PM    GLUCOSE 117 02/05/2023 12:14 PM    GLUCOSE 125 05/11/2012 08:42 AM PROT 6.5 02/05/2023 03:16 AM    LABALBU 3.9 02/05/2023 03:16 AM    LABALBU 4.8 05/11/2012 08:42 AM    CALCIUM 8.7 02/05/2023 12:14 PM    BILITOT 0.3 02/05/2023 03:16 AM    ALKPHOS 96 02/05/2023 03:16 AM    AST 45 02/05/2023 03:16 AM    ALT 33 02/05/2023 03:16 AM     Ionized Calcium:  No results found for: IONCA  Magnesium:    Lab Results   Component Value Date/Time    MG 2.0 10/14/2022 08:42 PM     Phosphorus:    Lab Results   Component Value Date/Time    PHOS 3.8 10/14/2022 08:42 PM     U/A:    Lab Results   Component Value Date/Time    NITRITE neg 02/14/2022 02:10 PM    COLORU Yellow 02/04/2023 09:18 PM    PHUR 5.5 02/04/2023 09:18 PM    WBCUA 0-1 02/04/2023 09:18 PM    RBCUA NONE 02/04/2023 09:18 PM    BACTERIA FEW 02/04/2023 09:18 PM    CLARITYU Clear 02/04/2023 09:18 PM    SPECGRAV >=1.030 02/04/2023 09:18 PM    LEUKOCYTESUR Negative 02/04/2023 09:18 PM    UROBILINOGEN 0.2 02/04/2023 09:18 PM    BILIRUBINUR Negative 02/04/2023 09:18 PM    BILIRUBINUR neg 02/14/2022 02:10 PM    BLOODU SMALL 02/04/2023 09:18 PM    GLUCOSEU Negative 02/04/2023 09:18 PM    AMORPHOUS MODERATE 04/23/2021 11:29 PM     Microalbumen/Creatinine ratio:  No components found for: RUCREAT  Iron Saturation:  No components found for: PERCENTFE  TIBC:    Lab Results   Component Value Date/Time    TIBC 145 08/01/2020 04:54 AM     FERRITIN:    Lab Results   Component Value Date/Time    FERRITIN 539 10/10/2020 01:45 AM        Imaging:  US RETROPERITONEAL COMPLETE   Final Result   Unremarkable ultrasound of the kidneys. Bladder not evaluate on this study. .      RECOMMENDATIONS:   Unavailable         CT HEAD WO CONTRAST   Final Result   No acute intracranial abnormality. CT ABDOMEN PELVIS WO CONTRAST Additional Contrast? None   Final Result   1. No urinary obstruction is seen   2.  However there is anterior bladder wall thickening and ventral to this on   the right ill-defined soft tissue mass area which is suspicious for hematoma partially involving rectus sheath although was also present previously to   indicate recurrence or residual, or the possibility of other etiology   3. Posterior subcutaneous right paramidline apparent complex fluid collection      RECOMMENDATIONS:   Clinical correlation. IV contrasted exam may be helpful to further evaluate,   or MRI, as clinically warranted         XR CHEST PORTABLE   Final Result   No acute cardiopulmonary process. Assessment  Acute kidney injury in the setting of volume contraction secondary to poor oral intake. He was on lisinopril at home which can contribute to SIMÓN with volume depletion. Urine electrolytes are not consistent with decreased effective volume, though urine studies were sent after IVF were infused. Hyperkalemia   Metabolic acidosis due to acute kidney injury   Anemia     Creatinine 3.9-->3.0-->2.2 mg/dL     Plan  Continue normal saline at 100 cc/hr  Low potassium diet   Hold lisinopril   Iron studies   Follow labs   Monitor I&O  Avoid nephrotoxins   Continue supportive care       Thank you for the opportunity to participate in the care of your pleasant patient. We look forward to following along with you.         Electronically signed by IGOR Castillo CNP on 2/5/2023 at 4:56 PM

## 2023-02-05 NOTE — CONSULTS
[facility] CONSULT NOTE  Reason for Consult:  complex fluid collection  Requesting Physician:  dr Penelope Kaur   Patient presents with    Altered Mental Status     Patient son found patient confused in the car and just not acting himself . Patient alert and oriented during triage stating \"I'm just tired\". Unknown LKW. Hx of diabetes . Takes coumadin at home       History Obtained From:  chart     HISTORY OFPRESENT ILLNESS              The patient is a 50 y.o. male with significant past medical history as below. He presented to the ED with lethargy and confusion. Ct scan of abd showd post subcutaneous right paramidline complex fluid collection.   Also ill defined soft tissu mass area which is suspicious for hematoma partially involving rectus sheath  was present previously  normal WBC and afebrile   Extensive surgical hx  right ing hernia repair 2019   ex lap with small bowel resection right hemicolectomy, cholecystectomy 2020,   50 pound weight loss during these surgeries    Past Medical History:   Diagnosis Date    Accident 11/2019    stepped on nail rt ft about 1 month ago- healed per patient    Acute thrombosis of inferior vena cava ([address] 10/10/2020    SIMÓN (acute kidney injury) ([address] 2008 apx    kidney bruised due to fall / Thedore Luria    Allergic rhinitis     Blister of left leg 8/31/2021    Cellulitis of leg, left 8/31/2021    Chronic back pain     Depression     Dermatophytosis 8/31/2021    Diabetes mellitus ([address]     Difficulty sleeping     at times    Displacement of lumbar intervertebral disc without myelopathy     Fibromyalgia     Fractured rib     2008 / healed    H/O seasonal allergies     Head injury 1980'S apx    no residual s/s    History of deep vein thrombosis 8/31/2021    Hyperlipidemia     Hypertension     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    Lymphedema of left leg 8/31/2021    Obesity (BMI 35.0-39.9 without comorbidity)     bmi 39.2  weight 296 #    Osteoarthritis Recurrent acute deep vein thrombosis (DVT) of left lower extremity ([address] 8/31/2021    S/P IVC filter 8/31/2021    Subtherapeutic international normalized ratio (INR) 8/31/2021    Thoracic or lumbosacral neuritis or radiculitis, unspecified        Past Surgical History:   Procedure Laterality Date    COLONOSCOPY N/A 9/5/2020    COLONOSCOPY WITH BIOPSY performed by Barbi Rawls MD at [address]    CT [facility] NEW ACCESS  8/3/2020    CT [facility] NEW ACCESS 8/3/2020 [facility] CT    FOOT SURGERY Right 1985    to treat shattered bones    HERNIA REPAIR  2001    DOUBLE HERNIA    HERNIA REPAIR Right 12/9/2019    LAPAROSCOPIC RIGHT INGUINAL HERNIA REPAIR, MESH 10x15 cm PLACEMENT performed by Elena Mancera MD at [place] [address] Left 4/11/2016    ILIAC ARTERY STENT INSERTION N/A 10/11/2020    S/P ILIAC STENT PLACEMENT VISUALIZATION performed by Bere Fitzgerald MD at [address] [place] [address] N/A 9/18/2020    LAPAROTOMY EXPLORATORY, CHOLECYSTECTOMY, BOWEL RESECTION, right darian colectomy, partial omentectomy, and splenectomy performed by Barbi Rawls MD at [address] FLX DX W/COLLJ [address] PFRMD N/A 5/7/2018    COLONOSCOPY DIAGNOSTIC performed by Criselda Paredes MD at [address] Left 2014    Dr. Wiliam Heller ARTHROSCOPY Left 11 21 14    SHOULDER SURGERY  2001 &2007    RIGHT AND LEFT repair of tears    THROMBECTOMY / EMBOLECTOMY FEMORAL Left 1/1/2021    LEFT LOWER EXTREMITY, VENOGRAM, FOLLOW UP POSSIBLE REMOVAL LYSIS CATHETER performed by Bere Fitzgerald MD at [address] / EMBOLECTOMY FEMORAL Left 1/2/2021    LEFT LOWER EXTREMITY VENOGRAM performed by Bere Fitzgerald MD at [address] Right 10/31/2016    Total right hip  Africa Pelaez MD    UPPER GASTROINTESTINAL ENDOSCOPY N/A 9/4/2020    EGD DIAGNOSTIC ONLY performed by Conny Leventhal, MD at [facility] PLACEMENT Left 1/3/2021    LEFT LOWER EXTREMITY VENOGRAM, PLACEMENT OF NEW LYSIS CATHETER performed by Bere Fitzgerald MD at [facility] N/A 5/12/2022    OPEN INCISIONAL HERNIA REPAIR WITH MESH performed by Barbi Rawls MD at [place] [address]  2007    LEFT WRIST       Current Facility-Administered Medications   Medication Dose Route Frequency Provider Last Rate Last Admin    [Held by provider] aspirin chewable tablet 81 mg  81 mg Oral Daily Loreen Kussmaul, MD        cholestyramine Damaris Demark) packet 4 g  1 packet Oral Daily Loreen Kussmaul, MD   4 g at 02/05/23 1403    [Held by provider] insulin glargine (LANTUS) injection vial 7 Units  7 Units SubCUTAneous Nightly Loreen Kussmaul, MD        gabapentin (NEURONTIN) capsule 300 mg  300 mg Oral Daily Loreen Kussmaul, MD   300 mg at 02/05/23 1248    0.9 % sodium chloride infusion   IntraVENous Continuous IGOR Dick -  mL/hr at 02/05/23 1405 New Bag at 02/05/23 1405    0.9 % sodium chloride bolus  1,000 mL IntraVENous Once Tami Aguilar MD        fluticasone (FLONASE) 50 MCG/ACT nasal spray 1 spray  1 spray Each Nostril Daily Lena Dave MD        metoprolol succinate (TOPROL XL) extended release tablet 50 mg  50 mg Oral Daily Hansr Rut Mcneill MD   50 mg at 02/05/23 1013    glucose chewable tablet 16 g  4 tablet Oral PRN Kannan Mcneill MD        dextrose bolus 10% 125 mL  125 mL IntraVENous PRN Lena Dave MD        Or    dextrose bolus 10% 250 mL  250 mL IntraVENous PRN Lena Dave MD        glucagon injection 1 mg  1 mg SubCUTAneous PRN Kannan Mcneill MD        dextrose 10 % infusion   IntraVENous Continuous PRN Lena Dave MD        sodium chloride flush 0.9 % injection 10 mL  10 mL IntraVENous 2 times per day Kannan Mcneill MD   10 mL at 02/05/23 1043    sodium chloride flush 0.9 % injection 10 mL  10 mL IntraVENous PRN Kannan Mcneill MD        0.9 % sodium chloride infusion   IntraVENous PRN Hansr Alyson Dillon MD        ondansetron (ZOFRAN-ODT) disintegrating tablet 4 mg  4 mg Oral Q8H PRN Kannan Dillon MD        Or    ondansetron [facility]) injection 4 mg  4 mg IntraVENous Q6H PRN Kannan Dillon MD        magnesium hydroxide (MILK OF MAGNESIA) 400 MG/5ML suspension 30 mL  30 mL Oral Daily PRN Renetta Cameron MD        acetaminophen (TYLENOL) tablet 650 mg  650 mg Oral Q6H PRN Renetta Cameron MD        Or    acetaminophen (TYLENOL) suppository 650 mg  650 mg Rectal Q6H PRN Kannan Dillon MD        insulin lispro (HUMALOG) injection vial 0-4 Units  0-4 Units SubCUTAneous TID WC Kannan Dillon MD        insulin lispro (HUMALOG) injection vial 0-4 Units  0-4 Units SubCUTAneous Nightly Kannan Dillon MD           Allergies   Allergen Reactions    Seasonal      HAYFEVER / sneezing,watery eyes    Tramadol Itching        Social History     Socioeconomic History    Marital status:      Spouse name: Not on file    Number of children: Not on file    Years of education: Not on file    Highest education level: Not on file   Occupational History    Occupation: disabled from     Tobacco Use    Smoking status: Never    Smokeless tobacco: Current     Types: Chew   Vaping Use    Vaping Use: Never used   Substance and Sexual Activity    Alcohol use: Not Currently     Alcohol/week: 0.0 standard drinks    Drug use: No    Sexual activity: Not Currently   Other Topics Concern    Not on file   Social History Narrative    Not on file     Social Determinants of Health     Financial Resource Strain: Not on file   Food Insecurity: Not on file   Transportation Needs: Not on file   Physical Activity: Not on file   Stress: Not on file   Social Connections: Not on file   Intimate Partner Violence: Not on file   Housing Stability: Not on file       Family History   Problem Relation Age of Onset    Hypertension Mother     Arthritis Father     Diabetes Father     Cancer Maternal Grandfather         Skin    Cancer Paternal Uncle         skin    Diabetes Paternal Grandfather     Heart Disease Maternal Grandmother     Stroke Maternal Aunt        Review of Systems      Vitals:    02/04/23 1752 02/04/23 2315 02/05/23 0740   BP: (!) 160/108 124/75 105/73   Pulse: 95 82 84   Resp: 18 18 18   Temp: 97.6 °F (36.4 °C) 97.5 °F (36.4 °C) 97.5 °F (36.4 °C)   TempSrc:  Temporal Temporal   SpO2: 96% 98% 93%       Physical Exam    Admission on 02/04/2023   Component Date Value Ref Range Status    WBC 02/04/2023 11.3  4.5 - 11.5 E9/L Final    RBC 02/04/2023 3.36 (A)  3.80 - 5.80 E12/L Final    Hemoglobin 02/04/2023 10.8 (A)  12.5 - 16.5 g/dL Final    Hematocrit 02/04/2023 32.2 (A)  37.0 - 54.0 % Final    MCV 02/04/2023 95.8  80.0 - 99.9 fL Final    MCH 02/04/2023 32.1  26.0 - 35.0 pg Final    MCHC 02/04/2023 33.5  32.0 - 34.5 % Final    RDW 02/04/2023 21.2 (A)  11.5 - 15.0 fL Final    Platelets 95/29/3853 356  130 - 450 E9/L Final    MPV 02/04/2023 9.2  7.0 - 12.0 fL Final    Neutrophils % 02/04/2023 77.4  43.0 - 80.0 % Final    Lymphocytes % 02/04/2023 9.5 (A)  20.0 - 42.0 % Final    Monocytes % 02/04/2023 12.2 (A)  2.0 - 12.0 % Final    Eosinophils % 02/04/2023 0.9  0.0 - 6.0 % Final    Basophils % 02/04/2023 0.8  0.0 - 2.0 % Final    Neutrophils Absolute 02/04/2023 8.70 (A)  1.80 - 7.30 E9/L Final    Lymphocytes Absolute 02/04/2023 1.13 (A)  1.50 - 4.00 E9/L Final    Monocytes Absolute 02/04/2023 1.36 (A)  0.10 - 0.95 E9/L Final    Eosinophils Absolute 02/04/2023 0.10  0.05 - 0.50 E9/L Final    Basophils Absolute 02/04/2023 0.00  0.00 - 0.20 E9/L Final    Hypersegmented Neutrophils 02/04/2023 1+   Final    Anisocytosis 02/04/2023 2+   Final    Polychromasia 02/04/2023 1+   Final    Poikilocytes 02/04/2023 2+   Final    Schistocytes 02/04/2023 1+   Final    Acanthocytes 02/04/2023 1+   Final    Beecher Cells 02/04/2023 1+   Final    Ovalocytes 02/04/2023 2+   Final    Target Cells 02/04/2023 1+   Final Sodium 02/04/2023 135  132 - 146 mmol/L Final    Potassium reflex Magnesium 02/04/2023 5.3 (A)  3.5 - 5.0 mmol/L Final    Chloride 02/04/2023 101  98 - 107 mmol/L Final    CO2 02/04/2023 19 (A)  22 - 29 mmol/L Final    Anion Gap 02/04/2023 15  7 - 16 mmol/L Final    Glucose 02/04/2023 162 (A)  74 - 99 mg/dL Final    BUN 02/04/2023 59 (A)  6 - 20 mg/dL Final    Creatinine 02/04/2023 3.9 (A)  0.7 - 1.2 mg/dL Final    Est, Glom Filt Rate 02/04/2023 18  >=60 mL/min/1.73 Final    Calcium 02/04/2023 8.9  8.6 - 10.2 mg/dL Final    Total Protein 02/04/2023 6.9  6.4 - 8.3 g/dL Final    Albumin 02/04/2023 4.2  3.5 - 5.2 g/dL Final    Total Bilirubin 02/04/2023 <0.2  0.0 - 1.2 mg/dL Final    Alkaline Phosphatase 02/04/2023 106  40 - 129 U/L Final    ALT 02/04/2023 37  0 - 40 U/L Final    AST 02/04/2023 55 (A)  0 - 39 U/L Final    Troponin, High Sensitivity 02/04/2023 85 (A)  0 - 11 ng/L Final    Pro-BNP 02/04/2023 1,329 (A)  0 - 125 pg/mL Final    Amphetamine Screen, Urine 02/04/2023 NOT DETECTED  Negative <1000 ng/mL Final    Barbiturate Screen, Ur 02/04/2023 NOT DETECTED  Negative < 200 ng/mL Final    Benzodiazepine Screen, Urine 02/04/2023 NOT DETECTED  Negative < 200 ng/mL Final    Cannabinoid Scrn, Ur 02/04/2023 NOT DETECTED  Negative < 50ng/mL Final    Cocaine Metabolite Screen, Urine 02/04/2023 NOT DETECTED  Negative < 300 ng/mL Final    Opiate Scrn, Ur 02/04/2023 NOT DETECTED  Negative < 300ng/mL Final    PCP Screen, Urine 02/04/2023 NOT DETECTED  Negative < 25 ng/mL Final    Methadone Screen, Urine 02/04/2023 NOT DETECTED  Negative <300 ng/mL Final    Oxycodone Urine 02/04/2023 NOT DETECTED  Negative <100 ng/mL Final    FENTANYL SCREEN, URINE 02/04/2023 POSITIVE (A)  Negative <1 ng/mL Final    Drug Screen Comment: 02/04/2023 see below   Final    Ethanol Lvl 02/04/2023 <10  mg/dL Final    Acetaminophen Level 02/04/2023 <5.0 (A)  10.0 - 30.0 mcg/mL Final    Salicylate, Serum 76/77/4960 <0.3  0.0 - 30.0 mg/dL Final TCA Scrn 02/04/2023 NEGATIVE  Cutoff:300 ng/mL Final    Ventricular Rate 02/04/2023 88  BPM Incomplete    Atrial Rate 02/04/2023 88  BPM Incomplete    P-R Interval 02/04/2023 168  ms Incomplete    QRS Duration 02/04/2023 102  ms Incomplete    Q-T Interval 02/04/2023 340  ms Incomplete    QTc Calculation (Bazett) 02/04/2023 411  ms Incomplete    P Axis 02/04/2023 41  degrees Incomplete    R Axis 02/04/2023 46  degrees Incomplete    T Axis 02/04/2023 40  degrees Incomplete    Ammonia 02/04/2023 22.0  16.0 - 60.0 umol/L Final    Color, UA 02/04/2023 Yellow  Straw/Yellow Final    Clarity, UA 02/04/2023 Clear  Clear Final    Glucose, Ur 02/04/2023 Negative  Negative mg/dL Final    Bilirubin Urine 02/04/2023 Negative  Negative Final    Ketones, Urine 02/04/2023 Negative  Negative mg/dL Final    Specific Gravity, UA 02/04/2023 >=1.030  1.005 - 1.030 Final    Blood, Urine 02/04/2023 SMALL (A)  Negative Final    pH, UA 02/04/2023 5.5  5.0 - 9.0 Final    Protein, UA 02/04/2023 30 (A)  Negative mg/dL Final    Urobilinogen, Urine 02/04/2023 0.2  <2.0 E.U./dL Final    Nitrite, Urine 02/04/2023 Negative  Negative Final    Leukocyte Esterase, Urine 02/04/2023 Negative  Negative Final    Hyaline Casts, UA 02/04/2023 0-2  0 - 2 /LPF Final    WBC, UA 02/04/2023 0-1  0 - 5 /HPF Final    RBC, UA 02/04/2023 NONE  0 - 2 /HPF Final    Bacteria, UA 02/04/2023 FEW (A)  None Seen /HPF Final    Protime 02/04/2023 65.8 (A)  9.3 - 12.4 sec Final    INR 02/04/2023 6.1 (A)   Final    SARS-CoV-2, NAAT 02/04/2023 Not Detected  Not Detected Final    Influenza A by PCR 02/04/2023 Not Detected  Not Detected Final    Influenza B by PCR 02/04/2023 Not Detected  Not Detected Final    Lactic Acid, Sepsis 02/04/2023 1.1  0.5 - 1.9 mmol/L Final    Lactic Acid, Sepsis 02/05/2023 0.7  0.5 - 1.9 mmol/L Final    Hemoglobin 02/05/2023 10.0 (A)  12.5 - 16.5 g/dL Final    Hematocrit 02/05/2023 31.1 (A)  37.0 - 54.0 % Final    Troponin, High Sensitivity 02/05/2023 67 (A)  0 - 11 ng/L Final    ABO/Rh 02/05/2023 O NEG   Final    Antibody Screen 02/05/2023 NEG   Final    Protime 02/05/2023 70.7 (A)  9.3 - 12.4 sec Final    INR 02/05/2023 6.6 (A)   Final    Protime 02/05/2023 67.7 (A)  9.3 - 12.4 sec Final    INR 02/05/2023 6.3 (A)   Final    Troponin, High Sensitivity 02/05/2023 64 (A)  0 - 11 ng/L Final    Troponin, High Sensitivity 02/05/2023 47 (A)  0 - 11 ng/L Final    Hemoglobin A1C 02/05/2023 5.8 (A)  4.0 - 5.6 % Final    Sodium 02/05/2023 137  132 - 146 mmol/L Final    Potassium reflex Magnesium 02/05/2023 5.4 (A)  3.5 - 5.0 mmol/L Final    Chloride 02/05/2023 106  98 - 107 mmol/L Final    CO2 02/05/2023 18 (A)  22 - 29 mmol/L Final    Anion Gap 02/05/2023 13  7 - 16 mmol/L Final    Glucose 02/05/2023 75  74 - 99 mg/dL Final    BUN 02/05/2023 59 (A)  6 - 20 mg/dL Final    Creatinine 02/05/2023 3.0 (A)  0.7 - 1.2 mg/dL Final    Est, Glom Filt Rate 02/05/2023 25  >=60 mL/min/1.73 Final    Calcium 02/05/2023 8.8  8.6 - 10.2 mg/dL Final    Total Protein 02/05/2023 6.5  6.4 - 8.3 g/dL Final    Albumin 02/05/2023 3.9  3.5 - 5.2 g/dL Final    Total Bilirubin 02/05/2023 0.3  0.0 - 1.2 mg/dL Final    Alkaline Phosphatase 02/05/2023 96  40 - 129 U/L Final    ALT 02/05/2023 33  0 - 40 U/L Final    AST 02/05/2023 45 (A)  0 - 39 U/L Final    WBC 02/05/2023 8.2  4.5 - 11.5 E9/L Final    RBC 02/05/2023 3.13 (A)  3.80 - 5.80 E12/L Final    Hemoglobin 02/05/2023 10.0 (A)  12.5 - 16.5 g/dL Final    Hematocrit 02/05/2023 30.1 (A)  37.0 - 54.0 % Final    MCV 02/05/2023 96.2  80.0 - 99.9 fL Final    MCH 02/05/2023 31.9  26.0 - 35.0 pg Final    MCHC 02/05/2023 33.2  32.0 - 34.5 % Final    RDW 02/05/2023 21.4 (A)  11.5 - 15.0 fL Final    Platelets 45/10/4385 334  130 - 450 E9/L Final    MPV 02/05/2023 8.8  7.0 - 12.0 fL Final    Neutrophils % 02/05/2023 54.5  43.0 - 80.0 % Final    Immature Granulocytes % 02/05/2023 0.2  0.0 - 5.0 % Final    Lymphocytes % 02/05/2023 29.9  20.0 - 42.0 % Final Monocytes % 02/05/2023 10.6  2.0 - 12.0 % Final    Eosinophils % 02/05/2023 3.9  0.0 - 6.0 % Final    Basophils % 02/05/2023 0.9  0.0 - 2.0 % Final    Neutrophils Absolute 02/05/2023 4.48  1.80 - 7.30 E9/L Final    Immature Granulocytes # 02/05/2023 0.02  E9/L Final    Lymphocytes Absolute 02/05/2023 2.46  1.50 - 4.00 E9/L Final    Monocytes Absolute 02/05/2023 0.87  0.10 - 0.95 E9/L Final    Eosinophils Absolute 02/05/2023 0.32  0.05 - 0.50 E9/L Final    Basophils Absolute 02/05/2023 0.07  0.00 - 0.20 E9/L Final    Anisocytosis 02/05/2023 2+   Final    Polychromasia 02/05/2023 1+   Final    Poikilocytes 02/05/2023 2+   Final    Acanthocytes 02/05/2023 1+   Final    [place] Cells 02/05/2023 1+   Final    Ovalocytes 02/05/2023 1+   Final    Target Cells 02/05/2023 1+   Final    Basophilic Stippling 59/00/5289 1+   Final    Govea-Jolly Bodies 02/05/2023 1+   Final    Hemoglobin 02/05/2023 10.1 (A)  12.5 - 16.5 g/dL Final    Hematocrit 02/05/2023 30.1 (A)  37.0 - 54.0 % Final    Pro-BNP 02/05/2023 1,182 (A)  0 - 125 pg/mL Final    Meter Glucose 02/05/2023 75  74 - 99 mg/dL Final    Protein, Ur 02/05/2023 9  0 - 12 mg/dL Final    Creatinine, Ur 02/05/2023 94  40 - 278 mg/dL Final    Protein/Creat Ratio 02/05/2023 0.1  0.0 - 0.2 Final    Protein/Creat Ratio 02/05/2023 0.1   Final    Creatinine, Ur 02/05/2023 94  40 - 278 mg/dL Final    Sodium, Ur 02/05/2023 75  Not Established mmol/L Final    Chloride 02/05/2023 65  Not Established mmol/L Final    Hemoglobin A1C 02/05/2023 5.7 (A)  4.0 - 5.6 % Final    Sodium 02/05/2023 138  132 - 146 mmol/L Final    Potassium 02/05/2023 5.2 (A)  3.5 - 5.0 mmol/L Final    Chloride 02/05/2023 109 (A)  98 - 107 mmol/L Final    CO2 02/05/2023 19 (A)  22 - 29 mmol/L Final    Anion Gap 02/05/2023 10  7 - 16 mmol/L Final    Glucose 02/05/2023 117 (A)  74 - 99 mg/dL Final    BUN 02/05/2023 49 (A)  6 - 20 mg/dL Final    Creatinine 02/05/2023 2.2 (A)  0.7 - 1.2 mg/dL Final    Est, Glom Filt Rate 02/05/2023 36  >=60 mL/min/1.73 Final    Calcium 02/05/2023 8.7  8.6 - 10.2 mg/dL Final    Meter Glucose 02/05/2023 117 (A)  74 - 99 mg/dL Final       ASSESSMENT:  Complex fluid collection  I will defer to surgery at this time   there is no evidence of infection at this point  [facility] negative   Hx of acute epiglottitis  DM neuropathy  On anticoagulants  Check procal   SIMÓN  He had mesh placed June 2022 between rectus sheath in order to fix this pts hernia repair    mesh can often be problematic in terms of infection but I do not see this at this juncture    PLAN:  He is going for retroperitoneal US  Perhaps things will change but at this point  keep off antibiotics and defer to Dr. Dana Kelly  Cardiology note noted          Electronically signed by Haylee Villa MD on 2/5/2023 at 2:40 PM

## 2023-02-06 PROBLEM — R18.8 INTRA-ABDOMINAL FLUID COLLECTION: Status: ACTIVE | Noted: 2023-02-06

## 2023-02-06 PROBLEM — N17.9 ACUTE RENAL FAILURE (HCC): Status: ACTIVE | Noted: 2023-02-06

## 2023-02-06 LAB
ALBUMIN SERPL-MCNC: 3.7 G/DL (ref 3.5–5.2)
ALP BLD-CCNC: 97 U/L (ref 40–129)
ALT SERPL-CCNC: 25 U/L (ref 0–40)
ANION GAP SERPL CALCULATED.3IONS-SCNC: 11 MMOL/L (ref 7–16)
ANISOCYTOSIS: ABNORMAL
AST SERPL-CCNC: 28 U/L (ref 0–39)
BASOPHILS ABSOLUTE: 0.14 E9/L (ref 0–0.2)
BASOPHILS RELATIVE PERCENT: 1.7 % (ref 0–2)
BILIRUB SERPL-MCNC: 0.9 MG/DL (ref 0–1.2)
BUN BLDV-MCNC: 33 MG/DL (ref 6–20)
BURR CELLS: ABNORMAL
CALCIUM SERPL-MCNC: 8.9 MG/DL (ref 8.6–10.2)
CHLORIDE BLD-SCNC: 106 MMOL/L (ref 98–107)
CO2: 21 MMOL/L (ref 22–29)
CREAT SERPL-MCNC: 1.2 MG/DL (ref 0.7–1.2)
EKG ATRIAL RATE: 83 BPM
EKG ATRIAL RATE: 88 BPM
EKG P AXIS: 41 DEGREES
EKG P AXIS: 47 DEGREES
EKG P-R INTERVAL: 162 MS
EKG P-R INTERVAL: 168 MS
EKG Q-T INTERVAL: 340 MS
EKG Q-T INTERVAL: 344 MS
EKG QRS DURATION: 102 MS
EKG QRS DURATION: 96 MS
EKG QTC CALCULATION (BAZETT): 404 MS
EKG QTC CALCULATION (BAZETT): 411 MS
EKG R AXIS: 43 DEGREES
EKG R AXIS: 46 DEGREES
EKG T AXIS: 31 DEGREES
EKG T AXIS: 40 DEGREES
EKG VENTRICULAR RATE: 83 BPM
EKG VENTRICULAR RATE: 88 BPM
EOSINOPHILS ABSOLUTE: 0.21 E9/L (ref 0.05–0.5)
EOSINOPHILS RELATIVE PERCENT: 2.6 % (ref 0–6)
FERRITIN: 154 NG/ML
GFR SERPL CREATININE-BSD FRML MDRD: >60 ML/MIN/1.73
GLUCOSE BLD-MCNC: 88 MG/DL (ref 74–99)
HCT VFR BLD CALC: 31 % (ref 37–54)
HEMOGLOBIN: 10.3 G/DL (ref 12.5–16.5)
HYPOCHROMIA: ABNORMAL
INR BLD: 1.6
IRON SATURATION: 57 % (ref 20–55)
IRON: 163 MCG/DL (ref 59–158)
LV EF: 65 %
LVEF MODALITY: NORMAL
LYMPHOCYTES ABSOLUTE: 1.38 E9/L (ref 1.5–4)
LYMPHOCYTES RELATIVE PERCENT: 16.5 % (ref 20–42)
MAGNESIUM: 1.8 MG/DL (ref 1.6–2.6)
MCH RBC QN AUTO: 33 PG (ref 26–35)
MCHC RBC AUTO-ENTMCNC: 33.2 % (ref 32–34.5)
MCV RBC AUTO: 99.4 FL (ref 80–99.9)
METER GLUCOSE: 104 MG/DL (ref 74–99)
METER GLUCOSE: 132 MG/DL (ref 74–99)
METER GLUCOSE: 162 MG/DL (ref 74–99)
METER GLUCOSE: 88 MG/DL (ref 74–99)
MONOCYTES ABSOLUTE: 0.49 E9/L (ref 0.1–0.95)
MONOCYTES RELATIVE PERCENT: 6.1 % (ref 2–12)
NEUTROPHILS ABSOLUTE: 5.91 E9/L (ref 1.8–7.3)
NEUTROPHILS RELATIVE PERCENT: 73.1 % (ref 43–80)
OVALOCYTES: ABNORMAL
PDW BLD-RTO: 20.9 FL (ref 11.5–15)
PHOSPHORUS: 2.5 MG/DL (ref 2.5–4.5)
PLATELET # BLD: 334 E9/L (ref 130–450)
PMV BLD AUTO: 9.3 FL (ref 7–12)
POIKILOCYTES: ABNORMAL
POLYCHROMASIA: ABNORMAL
POTASSIUM REFLEX MAGNESIUM: 4.7 MMOL/L (ref 3.5–5)
POTASSIUM SERPL-SCNC: 4.7 MMOL/L (ref 3.5–5)
PROTHROMBIN TIME: 17.5 SEC (ref 9.3–12.4)
RBC # BLD: 3.12 E12/L (ref 3.8–5.8)
SCHISTOCYTES: ABNORMAL
SODIUM BLD-SCNC: 138 MMOL/L (ref 132–146)
TARGET CELLS: ABNORMAL
TOTAL IRON BINDING CAPACITY: 287 MCG/DL (ref 250–450)
TOTAL PROTEIN: 6.2 G/DL (ref 6.4–8.3)
WBC # BLD: 8.1 E9/L (ref 4.5–11.5)

## 2023-02-06 PROCEDURE — 93306 TTE W/DOPPLER COMPLETE: CPT

## 2023-02-06 PROCEDURE — 83735 ASSAY OF MAGNESIUM: CPT

## 2023-02-06 PROCEDURE — 80053 COMPREHEN METABOLIC PANEL: CPT

## 2023-02-06 PROCEDURE — 85025 COMPLETE CBC W/AUTO DIFF WBC: CPT

## 2023-02-06 PROCEDURE — 99231 SBSQ HOSP IP/OBS SF/LOW 25: CPT | Performed by: SURGERY

## 2023-02-06 PROCEDURE — 82728 ASSAY OF FERRITIN: CPT

## 2023-02-06 PROCEDURE — 93010 ELECTROCARDIOGRAM REPORT: CPT | Performed by: INTERNAL MEDICINE

## 2023-02-06 PROCEDURE — 2580000003 HC RX 258: Performed by: INTERNAL MEDICINE

## 2023-02-06 PROCEDURE — 99233 SBSQ HOSP IP/OBS HIGH 50: CPT | Performed by: INTERNAL MEDICINE

## 2023-02-06 PROCEDURE — 97530 THERAPEUTIC ACTIVITIES: CPT

## 2023-02-06 PROCEDURE — 2060000000 HC ICU INTERMEDIATE R&B

## 2023-02-06 PROCEDURE — 6370000000 HC RX 637 (ALT 250 FOR IP): Performed by: FAMILY MEDICINE

## 2023-02-06 PROCEDURE — 84100 ASSAY OF PHOSPHORUS: CPT

## 2023-02-06 PROCEDURE — 83540 ASSAY OF IRON: CPT

## 2023-02-06 PROCEDURE — 82962 GLUCOSE BLOOD TEST: CPT

## 2023-02-06 PROCEDURE — 36415 COLL VENOUS BLD VENIPUNCTURE: CPT

## 2023-02-06 PROCEDURE — 6370000000 HC RX 637 (ALT 250 FOR IP): Performed by: INTERNAL MEDICINE

## 2023-02-06 PROCEDURE — 83550 IRON BINDING TEST: CPT

## 2023-02-06 PROCEDURE — 97165 OT EVAL LOW COMPLEX 30 MIN: CPT

## 2023-02-06 PROCEDURE — 80048 BASIC METABOLIC PNL TOTAL CA: CPT

## 2023-02-06 PROCEDURE — 97161 PT EVAL LOW COMPLEX 20 MIN: CPT

## 2023-02-06 PROCEDURE — 85610 PROTHROMBIN TIME: CPT

## 2023-02-06 PROCEDURE — 97535 SELF CARE MNGMENT TRAINING: CPT

## 2023-02-06 RX ADMIN — FLUTICASONE PROPIONATE 1 SPRAY: 50 SPRAY, METERED NASAL at 09:54

## 2023-02-06 RX ADMIN — METOPROLOL SUCCINATE 50 MG: 50 TABLET, EXTENDED RELEASE ORAL at 09:53

## 2023-02-06 RX ADMIN — GABAPENTIN 300 MG: 300 CAPSULE ORAL at 09:53

## 2023-02-06 RX ADMIN — SODIUM CHLORIDE, PRESERVATIVE FREE 10 ML: 5 INJECTION INTRAVENOUS at 20:20

## 2023-02-06 RX ADMIN — CHOLESTYRAMINE 4 G: 4 POWDER, FOR SUSPENSION ORAL at 09:54

## 2023-02-06 ASSESSMENT — PAIN SCALES - GENERAL: PAINLEVEL_OUTOF10: 0

## 2023-02-06 NOTE — PROGRESS NOTES
Physical Therapy  Physical Therapy Initial Assessment     Name: Tierney Grijalva  : 1975  MRN: [id number]      Date of Service: 2023    Evaluating PT:  Zev Cabral PT, DPT    Room #:  8501/8501-B  Diagnosis:  SIMÓN (acute kidney injury) ([facility] [N17.9]  Acute renal failure, unspecified acute renal failure type ([facility] [N17.9]  Altered mental status, unspecified altered mental status type [R41.82]  PMHx/PSHx:  depression, DM, HLD, HTN, OA, obesity  Procedure/Surgery:  N/A  Precautions:  fall risk, L eye blindness  Equipment Needs:  None anticipated    SUBJECTIVE:    Pt lives with son in a tri-level home with level entry. Bed is on the lower level, down 5 steps with 1 handrail. Bath is on the upper level, ~10 steps with 1 handrail from the main level to access. Pt ambulated with ww PRN PTA. OBJECTIVE:   Initial Evaluation  Date: 23 Treatment Short Term/ Long Term   Goals   AM-PAC 6 Clicks 69/67     Was pt agreeable to Eval/treatment? yes     Does pt have pain?  7/10 abdominal     Bed Mobility  Rolling: IND  Supine to sit: IND  Sit to supine: IND  Scooting: IND     Transfers Sit to stand: min A  Stand to sit: SBA  Stand pivot: SBA with no AD  Sit to stand: mod I  Stand to sit: mod I  Stand pivot: mod I with AAD   Ambulation    10'x2 with no AD CGA  150'+ with AAD mod I   Stair negotiation: ascended and descended  NT  10 steps with 1 handrail mod I     Strength/ROM:   BLE grossly 4/5  BLE AROM WFL    Balance:   Static Sitting: IND  Dynamic Sitting: Supervision  Static Standing: SBA with no AD  Dynamic Standing: CGA with no AD    Pt is A & O x 3  Sensation:  Pt denies numbness and tingling to extremities  Edema:  unremarkable    Vitals:  SpO2 and HR were stable during session    Therapeutic Exercises:    Bed mobility: supine<>sit, cued for EOB positioning  Transfers: STSx2, cued for hand placement and postural correction  Ambulation: 10'x2 with no AD  BLE AROM    Patient education  Pt educated on role of PT, importance of functional mobility during hospital stay, safety with functional mobility    Patient response to education:   Pt verbalized understanding Pt demonstrated skill Pt requires further education in this area   yes yes reinforce     ASSESSMENT:    Conditions Requiring Skilled Therapeutic Intervention:    [x]Decreased strength     []Decreased ROM  [x]Decreased functional mobility  [x]Decreased balance   [x]Decreased endurance   []Decreased posture  []Decreased sensation  []Decreased coordination   []Decreased vision  []Decreased safety awareness   [x]Increased pain       Comments:    Pt supine in bed upon entering, agreeable to participate. Pt requesting to use bathroom, cued to transfer to EOB. Pt sitting upright with good static sitting balance, no physical assistance needed. Pt was zachary to transfer safely from bedside and ambulate into bathroom, cued for safe transfer on/off of toilet. Pt required increased assistance with transfer from toilet 2/2 discomfort related to pt's catheter. Due to discomfort and abdominal pain, pt's session was abbreviated, pt requested to return to bed after bathroom use. Pt transferred back to R sidelying position with no physical assistance. Pt remained in bed with all needs met and call bell in reach prior to exiting. Treatment:  Patient practiced and was instructed in the following treatment:    STS and pivot transfer training - pt educated on proper hand and foot placement, safety and sequencing, and use of verbal and tactile cues to safely complete sit<>stand and pivot transfers with hands on assistance to complete task safely   Gait training- pt was given verbal and tactile cues to facilitate pt safety during ambulation as well as provided with stand by assistance. Pt's/ family goals   1. Return home    Prognosis is good for reaching above PT goals. Patient and or family understand(s) diagnosis, prognosis, and plan of care.   yes    PHYSICAL THERAPY PLAN OF CARE:    PT POC is established based on physician order and patient diagnosis     Referring provider/PT Order:  Maren Vazquez MD  Diagnosis:  SIMÓN (acute kidney injury) ([facility] [N17.9]  Acute renal failure, unspecified acute renal failure type ([facility] [N17.9]  Altered mental status, unspecified altered mental status type [R41.82]  Specific instructions for next treatment:  Progress as tolerated    Current Treatment Recommendations:     [x] Strengthening to improve independence with functional mobility   [] ROM to improve independence with functional mobility   [x] Balance Training to improve static/dynamic balance and to reduce fall risk  [x] Endurance Training to improve activity tolerance during functional mobility   [x] Transfer Training to improve safety and independence with all functional transfers   [x] Gait Training to improve gait mechanics, endurance and assess need for appropriate assistive device  [x] Stair Training in preparation for safe discharge home and/or into the community   [] Positioning to prevent skin breakdown and contractures  [x] Safety and Education Training   [x] Patient/Caregiver Education   [] HEP  [] Other     PT long term treatment goals are located in above grid    Frequency of treatments: 2-5x/week x 1-2 weeks. Time in  1010  Time out  1035    Total Treatment Time  10 minutes     Evaluation Time includes thorough review of current medical information, gathering information on past medical history/social history and prior level of function, completion of standardized testing/informal observation of tasks, assessment of data and education on plan of care and goals.     CPT codes:  [x] Low Complexity PT evaluation 94401  [] Moderate Complexity PT evaluation 79349  [] High Complexity PT evaluation 35040  [] PT Re-evaluation 53734  [] Gait training 63935 -- minutes  [] Manual therapy 12906 -- minutes  [x] Therapeutic activities 05399 10 minutes  [] Therapeutic exercises 91910 -- minutes  [] Neuromuscular reeducation 85681 -- minutes     Jagjit Cote, PT, DPT  [id number]

## 2023-02-06 NOTE — PROGRESS NOTES
[facility]   ATTENDING PHYSICIAN PROGRESS NOTE     I have examined the patient, reviewed the record, and discussed the case with the APN/ Resident. I have reviewed all relevant labs and imaging data. Please refer to the APN/ resident's note. I agree with the assessment and plan with the following corrections/ additions. The following summarizes my clinical findings and independent assessment. CC: chronic intra-abd fluid collection    Pt denies nausea or vomiting. Reports he is passing flatus.     Asleep but arousable  Follows commands  Hrt:  regular rate/rhythm; no murmur  Lungs:  fairly clear bilaterally  Abd:  soft; BS active; NT/ND  Skin:  warm/dry    Labs personally reviewed    Patient Active Problem List    Diagnosis Date Noted    Elevated troponin 02/05/2023    Anemia 02/05/2023    Acute epiglottitis with airway obstruction 10/14/2022    Supratherapeutic INR 10/14/2022    Class 2 severe obesity due to excess calories with serious comorbidity in adult [facility]) 10/14/2022    Hernia of abdominal wall 05/12/2022    Incisional hernia of anterior abdominal wall without obstruction or gangrene 03/29/2022    Acute kidney injury (SIMÓN) with acute tubular necrosis (ATN) ([address] 10/08/2021    SIMÓN (acute kidney injury) ([address] 09/08/2021    Cellulitis of left lower extremity 08/31/2021    Dermatophytosis 08/31/2021    Lymphedema of left leg 08/31/2021    Recurrent acute deep vein thrombosis (DVT) of left lower extremity ([address] 08/31/2021    S/P IVC filter 08/31/2021    History of deep vein thrombosis 08/31/2021    Subtherapeutic international normalized ratio (INR) 08/31/2021    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[address] 08/31/2021    Tobacco dependence 11/19/2020    Anticoagulated 11/19/2020    Acute blood loss anemia     Thrombocytosis 08/10/2020    Abnormal findings on diagnostic imaging of gall bladder 08/01/2020    Cyst of spleen 08/01/2020    Other pulmonary embolism without acute cor pulmonale (HCC) 08/01/2020    Rectus diastasis 11/01/2019    Recurrent unilateral inguinal hernia 11/01/2019    Neuropathy 06/30/2019    Left leg swelling 06/30/2019    Other hyperlipidemia 10/13/2017    Primary osteoarthritis of right hip 11/03/2016    Primary osteoarthritis of left hip 04/11/2016    Type 2 diabetes mellitus without complication, with long-term current use of insulin ([facility] 04/08/2016    Lumbar disc herniation 02/22/2016    Lumbar radiculopathy 12/17/2015    Osteoarthritis of spine with radiculopathy, lumbar region 12/17/2015    Class 1 obesity in adult 12/17/2015    Allergic rhinitis 11/23/2015    Primary hypertension 10/16/2015    Vitamin D deficiency 04/14/2015    Fibromyalgia 12/15/2014    Insomnia 07/04/2014    Osteoarthritis 03/27/2014    Lumbar spondylosis 01/07/2014    Neuropathic pain 05/08/2012     Chronic intra-abd fluid collection after retrorectus mesh placement--no clinical signs of infection  Diet as tolerated  Acute kidney injury/azotemia--monitor BUN/Cr/UO; nephrology consulted  Dispo per primary service--okay to discharge from surgery standpoint    Hai Mayo MD, FACS  2/6/2023  10:34 AM

## 2023-02-06 NOTE — CARE COORDINATION
I met with pt in the room with PT and OT this a.m. the pt lives in a 3 story home with 5+3 side door steps to enter home and then pt has his bedroom and 1/2 bath in the basement with 13 steps down. There is a full bed and bath on the 2nd floor. No [facility] pta. Pt said his son, Opal Prasad, who is 25years old lives with him and works. Pt is on disability. Pt said most of the time pending his medical issues he is independent and drives. Pt has a fww and uses it at home, w/c out side, several canes, tub transfer bench and 3:1 commode. Pt is diabetic and is on iinsulin pta and his glucometer is not working. I left a note for the rn and pcp for a script /order for new one with strips and lancets. Pt is not a . His pcp is dr. Ly Huggins. Plan is home. PT and OT rec: [facility] or outpatient rehab. Pt has chronic diarrhea and that can cause  him to need assistance at times. Will discuss with pt his therapy options. OSMAN Edwards  .2/6/2023  Pt in agreement to [facility]. He has no preference for agency when list offered. Referral made to [facility], will need orders for PT and OT only. Case Management Assessment  Initial Evaluation    Date/Time of Evaluation: 2/6/2023 12:07 PM  Assessment Completed by: OSMAN Edwards    If patient is discharged prior to next notation, then this note serves as note for discharge by case management. Patient Name: Nery Zhu II                   YOB: 1975  Diagnosis: SIMÓN (acute kidney injury) ([facility] [N17.9]  Acute renal failure, unspecified acute renal failure type ([facility] [N17.9]  Altered mental status, unspecified altered mental status type [R41.82]                   Date / Time: 2/4/2023  5:48 PM    Patient Admission Status: Inpatient   Readmission Risk (Low < 19, Mod (19-27), High > 27): Readmission Risk Score: 15.7    Current PCP: Carlin Ace PA-C  PCP verified by CM?  Yes    Chart Reviewed: Yes      History Provided by: Patient  Patient Orientation: Alert and Oriented Patient Cognition: Alert    Hospitalization in the last 30 days (Readmission):  No    If yes, Readmission Assessment in  Navigator will be completed. Advance Directives:      Code Status: Full Code   Patient's Primary Decision Maker is:      Primary Decision Maker: Lupe Tobar - [phone]    Discharge Planning:    Patient lives with: Family Members Type of Home: House  Primary Care Giver: Self  Patient Support Systems include: Children   Current Financial resources:    Current community resources:    Current services prior to admission: None            Current DME:              Type of Home Care services:  None    ADLS  Prior functional level: Independent in ADLs/IADLs  Current functional level: Independent in ADLs/IADLs    PT AM-PAC:   /24  OT AM-PAC: 16 /24    Family can provide assistance at DC: Yes  Would you like Case Management to discuss the discharge plan with any other family members/significant others, and if so, who? Plans to Return to Present Housing: Yes  Other Identified Issues/Barriers to RETURNING to current housing: to be determined. Potential Assistance needed at discharge: N/A            Potential DME:    Patient expects to discharge to: [address] [place] [address] for transportation at discharge:      Financial    Payor: [address] [place] [address] / Plan: Clarisse Coreas DEPT OF JOB / Product Type: *No Product type* /     Does insurance require precert for SNF: Yes    Potential assistance Purchasing Medications:    Meds-to-Beds request: Yes      [facility] [address] [address] [facility] [id number], [address]  [facility] [address]  Phone: [phone] Fax: [phone]    [facility] [address] [phone] - F [phone]  [address] [place] [address]  Phone: [phone] Fax: [phone]      Notes:    Factors facilitating achievement of predicted outcomes: Family support, Motivated, Cooperative, and Pleasant    Barriers to discharge: none     Additional Case Management Notes: see above. The Plan for Transition of Care is related to the following treatment goals of SIMÓN (acute kidney injury) ([address] [N17.9]  Acute renal failure, unspecified acute renal failure type ([address] [N17.9]  Altered mental status, unspecified altered mental status type [J43.66]    IF APPLICABLE: The Patient and/or patient representative Wells Love and his family were provided with a choice of provider and agrees with the discharge plan. Freedom of choice list with basic dialogue that supports the patient's individualized plan of care/goals and shares the quality data associated with the providers was provided to:     Patient Representative Name:       The Patient and/or Patient Representative Agree with the Discharge Plan?       Pallavi Bridges [facility]  Case Management Department  Ph: [phone] Fax: [phone]

## 2023-02-06 NOTE — PROGRESS NOTES
Infectious Disease  Progress Note  [facility]    Chief Complaint: none    Subjective: complex intraabdominal collection    Scheduled Meds:   [Held by provider] aspirin  81 mg Oral Daily    cholestyramine  1 packet Oral Daily    [Held by provider] insulin glargine  7 Units SubCUTAneous Nightly    gabapentin  300 mg Oral Daily    sodium chloride  1,000 mL IntraVENous Once    fluticasone  1 spray Each Nostril Daily    metoprolol succinate  50 mg Oral Daily    sodium chloride flush  10 mL IntraVENous 2 times per day    insulin lispro  0-4 Units SubCUTAneous TID WC    insulin lispro  0-4 Units SubCUTAneous Nightly     Continuous Infusions:   sodium chloride 100 mL/hr at 02/05/23 1405    dextrose      sodium chloride       PRN Meds:glucose, dextrose bolus **OR** dextrose bolus, glucagon (rDNA), dextrose, sodium chloride flush, sodium chloride, ondansetron **OR** ondansetron, magnesium hydroxide, acetaminophen **OR** acetaminophen    Patient Vitals for the past 24 hrs:   BP Temp Temp src Pulse Resp SpO2 Height Weight   02/06/23 0946 (!) 144/63 96.8 °F (36 °C) Temporal 83 18 96 % -- --   02/06/23 0545 -- -- -- -- -- -- -- 239 lb 10.2 oz (108.7 kg)   02/05/23 1930 116/77 96.8 °F (36 °C) Temporal 68 20 95 % -- --   02/05/23 1740 -- -- -- -- -- -- 6' 1\" (1.854 m) 278 lb (126.1 kg)   02/05/23 1620 135/78 98 °F (36.7 °C) Oral 83 20 94 % -- --       CBC with Differential:    Lab Results   Component Value Date/Time    WBC 8.1 02/06/2023 05:13 AM    RBC 3.12 02/06/2023 05:13 AM    HGB 10.3 02/06/2023 05:13 AM    HCT 31.0 02/06/2023 05:13 AM     02/06/2023 05:13 AM    MCV 99.4 02/06/2023 05:13 AM    MCH 33.0 02/06/2023 05:13 AM    MCHC 33.2 02/06/2023 05:13 AM    RDW 20.9 02/06/2023 05:13 AM    NRBC 0.9 11/19/2020 06:38 AM    SEGSPCT 80 11/26/2013 01:45 PM    LYMPHOPCT 16.5 02/06/2023 05:13 AM    MONOPCT 6.1 02/06/2023 05:13 AM    BASOPCT 1.7 02/06/2023 05:13 AM    MONOSABS 0.49 02/06/2023 05:13 AM    LYMPHSABS 1.38 02/06/2023 05:13 AM    EOSABS 0.21 02/06/2023 05:13 AM    BASOSABS 0.14 02/06/2023 05:13 AM     CMP:    Lab Results   Component Value Date/Time     02/06/2023 05:13 AM    K 4.7 02/06/2023 05:13 AM    K 4.7 02/06/2023 05:13 AM     02/06/2023 05:13 AM    CO2 21 02/06/2023 05:13 AM    BUN 33 02/06/2023 05:13 AM    CREATININE 1.2 02/06/2023 05:13 AM    GFRAA >60 10/17/2022 06:36 AM    LABGLOM >60 02/06/2023 05:13 AM    GLUCOSE 88 02/06/2023 05:13 AM    GLUCOSE 125 05/11/2012 08:42 AM    PROT 6.2 02/06/2023 05:13 AM    LABALBU 3.7 02/06/2023 05:13 AM    LABALBU 4.8 05/11/2012 08:42 AM    CALCIUM 8.9 02/06/2023 05:13 AM    BILITOT 0.9 02/06/2023 05:13 AM    ALKPHOS 97 02/06/2023 05:13 AM    AST 28 02/06/2023 05:13 AM    ALT 25 02/06/2023 05:13 AM       BP (!) 144/63   Pulse 83   Temp 96.8 °F (36 °C) (Temporal)   Resp 18   Ht 6' 1\" (1.854 m)   Wt 239 lb 10.2 oz (108.7 kg)   SpO2 96%   BMI 31.62 kg/m²     Physical Exam  Const/Neuro- unchanged, no signs of acute distress, Alert  ENMT- Within Normal Limits, Normocephalic, mucous membranes pink/moist, No thrush  Neck: Neck supple  Heart- Regular, Rate, Rhythm- no murmur appreciated. Lungs- clear to ascultation. Respirations even and nonlabored. Abdomen- Soft, bowel sounds positive, non tender  Musculo/Extremities-  Equal and symmetrical, no edema. No tenderness. Skin:  Warm and dry, free from rashes. Cultures reviewed    Radiology reviewed  [facility]   Final Result   Unremarkable ultrasound of the kidneys. Bladder not evaluate on this study. .      RECOMMENDATIONS:   Unavailable         CT HEAD WO CONTRAST   Final Result   No acute intracranial abnormality. CT ABDOMEN PELVIS WO CONTRAST Additional Contrast? None   Final Result   1. No urinary obstruction is seen   2.  However there is anterior bladder wall thickening and ventral to this on   the right ill-defined soft tissue mass area which is suspicious for hematoma   partially involving rectus sheath although was also present previously to   indicate recurrence or residual, or the possibility of other etiology   3. Posterior subcutaneous right paramidline apparent complex fluid collection      RECOMMENDATIONS:   Clinical correlation. IV contrasted exam may be helpful to further evaluate,   or MRI, as clinically warranted         XR CHEST PORTABLE   Final Result   No acute cardiopulmonary process.              Assessment    Complex fluid collection    I will defer to surgery at this time   there is no evidence of infection at this point    [facility] negative     Hx of acute epiglottitis    DM neuropathy    On anticoagulants    Check procal     SIMÓN    He had mesh placed June 2022 between rectus sheath in order to fix this pts hernia repair    mesh can often be problematic in terms of infection but I do not see this at this juncture  Plan    He is going for retroperitoneal US    Perhaps things will change but at this point  keep off antibiotics and defer to Dr. Leonardo Ruiz    Cardiology note noted  Surgery note noted today  ID will sign off          Electronically signed by Ameya Dave MD on 2/6/2023 at 12:32 PM

## 2023-02-06 NOTE — PROGRESS NOTES
[facility] MD Livier Mooney, MD Salvador Bear, MD Kishore Kumar, CNP   Rachelle Nam, ANP  Poonam oTmas, BETTY  Progress Note    2/6/2023    SUBJECTIVE:   2/6: Seen while laying in bed watching television. Feels slightly better today. Ongoing diarrhea continues. He tells me that he goes 4-5 times throughout the day. Denies chest pain, dyspnea, or palpitations. His appetite is good. PROBLEM LIST:    Principal Problem:    SIMÓN (acute kidney injury) ([address]  Active Problems:    Supratherapeutic INR    Elevated troponin    Anemia    Intra-abdominal fluid collection    Acute renal failure ([address]    Primary hypertension  Resolved Problems:    * No resolved hospital problems. *         DIET:    ADULT DIET; Regular; 3 carb choices (45 gm/meal);  Low Potassium (Less than 3000 mg/day)     MEDS (scheduled):    [Held by provider] aspirin  81 mg Oral Daily    cholestyramine  1 packet Oral Daily    [Held by provider] insulin glargine  7 Units SubCUTAneous Nightly    gabapentin  300 mg Oral Daily    sodium chloride  1,000 mL IntraVENous Once    fluticasone  1 spray Each Nostril Daily    metoprolol succinate  50 mg Oral Daily    sodium chloride flush  10 mL IntraVENous 2 times per day    insulin lispro  0-4 Units SubCUTAneous TID WC    insulin lispro  0-4 Units SubCUTAneous Nightly       MEDS (infusions):   sodium chloride 100 mL/hr at 02/05/23 1405    dextrose      sodium chloride         MEDS (prn):  glucose, dextrose bolus **OR** dextrose bolus, glucagon (rDNA), dextrose, sodium chloride flush, sodium chloride, ondansetron **OR** ondansetron, magnesium hydroxide, acetaminophen **OR** acetaminophen    PHYSICAL EXAM:     Patient Vitals for the past 24 hrs:   BP Temp Temp src Pulse Resp SpO2 Height Weight   02/06/23 0946 (!) 144/63 96.8 °F (36 °C) Temporal 83 18 96 % -- --   02/06/23 0545 -- -- -- -- -- -- -- 239 lb 10.2 oz (108.7 kg)   02/05/23 1930 116/77 96.8 °F (36 °C) Temporal 68 20 95 % -- --   02/05/23 1740 -- -- -- -- -- -- 6' 1\" (1.854 m) 278 lb (126.1 kg)   @      Intake/Output Summary (Last 24 hours) at 2/6/2023 1633  Last data filed at 2/6/2023 0745  Gross per 24 hour   Intake --   Output 2150 ml   Net -2150 ml         Wt Readings from Last 3 Encounters:   02/06/23 239 lb 10.2 oz (108.7 kg)   10/21/22 273 lb 4.8 oz (124 kg)   08/19/22 295 lb (133.8 kg)       Constitutional:  in no acute distress  HEENT: NC/AT, EOMI, sclera and conjunctiva are clear and anicteric, mucus membranes moist  Neck: Trachea midline, no JVD  Cardiovascular: S1, S2 regular rhythm, no murmur,or rub  Respiratory:  CTAB, diminished in the bases. No crackles, no wheeze  Gastrointestinal:  Soft, nontender, nondistended, NABS  Ext: trace dependent edema, feet warm  Skin: dry, no rash  Neuro: awake, alert, interactive      DATA:    Recent Labs     02/04/23  1809 02/05/23  0008 02/05/23 0316 02/05/23  1214 02/06/23  0513   WBC 11.3  --  8.2  --  8.1   HGB 10.8*   < > 10.0* 10.1* 10.3*   HCT 32.2*   < > 30.1* 30.1* 31.0*   MCV 95.8  --  96.2  --  99.4     --  334  --  334    < > = values in this interval not displayed. Recent Labs     02/04/23  1809 02/05/23  0316 02/05/23  0316 02/05/23  1214 02/05/23  2041 02/06/23  0513    137  --  138 139 138   K 5.3* 5.4*   < > 5.2* 4.9 4.7  4.7    106  --  109* 111* 106   CO2 19* 18*  --  19* 20* 21*   MG  --   --   --   --   --  1.8   PHOS  --   --   --   --   --  2.5   BUN 59* 59*  --  49* 40* 33*   CREATININE 3.9* 3.0*  --  2.2* 1.5* 1.2   ALT 37 33  --   --   --  25   AST 55* 45*  --   --   --  28   BILITOT <0.2 0.3  --   --   --  0.9   ALKPHOS 106 96  --   --   --  97    < > = values in this interval not displayed.        Lab Results   Component Value Date    LABPROT 0.1 02/05/2023    LABPROT 0.1 02/05/2023       ASSESSMENT :  1.  SIMÓN, hemodynamically mediated due to volume contraction associated with poor oral intake in the setting of ongoing loose to watery diarrhea, which represents his baseline since the colectomy. The urine indices are of little clinical utility given the time which they were sent (after he received quite a bit of IV normal saline). Azotemia improving with IV fluid resuscitation. 2.  Hyperkalemia due to SIMÓN, and decreased effective circulating volume     3. Anemia, normocytic     4. Metabolic acidosis, non-anion gap, secondary to combination of SIMÓN and diarrhea    Still with diarrhea, though he states this is chronic for him. He is much more alert today and reports that he is eating and drinking plenty of fluids. He was started on cholestyramine yesterday.     Creatinine 1.2 mg/dL     Plan:  Stop IVF, kidney function normal   Low potassium diet   Continue to hold lisinopril for now   Check am labs   Follow labs   Monitor I&O  Avoid nephrotoxins   Continue supportive care     Electronically signed by IGOR Yi CNP on 2/6/2023 at 4:33 PM

## 2023-02-06 NOTE — PROGRESS NOTES
Spoke with RN regarding patient's ordered Drain insertion. Surgery note states that they do not recommend draining of this collection. Floor RN notified that Sound physician needs reconsulted and order needs cancelled if they do not want a drain.

## 2023-02-06 NOTE — PROGRESS NOTES
Hospitalist Progress Note      Synopsis:   Briefly, patient with PMH significant for DVT status post IVC filter on Coumadin. Type 2 diabetes independent, legally blind in left eye, HPT, HPL, fibromyalgia, presented to ED for altered mental status. Patient was found to have SIMÓN with a creatinine of 3.0. Additionally was found to have supratherapeutic INR on presentation of 6.1. Potassium elevated at 3.5. CO2 19 conservative met metabolic acidosis however anion gap was not elevated at 15. CT of head was negative for any acute findings, CT abdomen showed no urinary obstruction however bladder wall thickening and ventral ill-defined soft tissue mass suspicious for hematoma partially involved in the rectus sheath was appreciated. General surgery was consulted and does not believe that any intervention needs done, signed off. Cardiology was consulted echocardiogram was ordered showed normal EF. Infectious disease was consulted as of right now patient is off antibiotics and patient is for retroperitoneal ultrasound. Subjective:  Stable overnight. No issues reported. Patient seen and examined  Records reviewed. Temp (24hrs), Av.8 °F (36 °C), Min:96.8 °F (36 °C), Max:96.8 °F (36 °C)    DIET: ADULT DIET; Regular; 3 carb choices (45 gm/meal); Low Potassium (Less than 3000 mg/day)  CODE: Full Code    Intake/Output Summary (Last 24 hours) at 2023 5991  Last data filed at 2023 0745  Gross per 24 hour   Intake --   Output 2150 ml   Net -2150 ml           Objective:    BP (!) 144/63   Pulse 83   Temp 96.8 °F (36 °C) (Temporal)   Resp 18   Ht 6' 1\" (1.854 m)   Wt 239 lb 10.2 oz (108.7 kg)   SpO2 96%   BMI 31.62 kg/m²     General appearance: No apparent distress, appears stated age and cooperative. HEENT: Conjunctivae/corneas clear. Mucous membranes moist.  Neck: Supple. No JVD. Respiratory:  Clear to auscultation bilaterally. Normal respiratory effort. Cardiovascular:  RRR.  S1, S2 without MRG. PV: Pulses palpable. No edema. Abdomen: Soft, non-tender, non-distended. +BS  Musculoskeletal: No obvious deformities. Skin: Normal skin color. No rashes or lesions. Good turgor. Neurologic:  Grossly non-focal. Awake, alert, following commands. Psychiatric: Alert and oriented, thought content appropriate, normal insight and judgement    Medications:  REVIEWED DAILY    Infusion Medications    sodium chloride 100 mL/hr at 02/05/23 1405    dextrose      sodium chloride       Scheduled Medications    [Held by provider] aspirin  81 mg Oral Daily    cholestyramine  1 packet Oral Daily    [Held by provider] insulin glargine  7 Units SubCUTAneous Nightly    gabapentin  300 mg Oral Daily    sodium chloride  1,000 mL IntraVENous Once    fluticasone  1 spray Each Nostril Daily    metoprolol succinate  50 mg Oral Daily    sodium chloride flush  10 mL IntraVENous 2 times per day    insulin lispro  0-4 Units SubCUTAneous TID WC    insulin lispro  0-4 Units SubCUTAneous Nightly     PRN Meds: glucose, dextrose bolus **OR** dextrose bolus, glucagon (rDNA), dextrose, sodium chloride flush, sodium chloride, ondansetron **OR** ondansetron, magnesium hydroxide, acetaminophen **OR** acetaminophen    Labs:     Recent Labs     02/04/23  1809 02/05/23  0008 02/05/23 0316 02/05/23 1214 02/06/23 0513   WBC 11.3  --  8.2  --  8.1   HGB 10.8*   < > 10.0* 10.1* 10.3*   HCT 32.2*   < > 30.1* 30.1* 31.0*     --  334  --  334    < > = values in this interval not displayed.        Recent Labs     02/05/23  1214 02/05/23  2041 02/06/23  0513    139 138   K 5.2* 4.9 4.7  4.7   * 111* 106   CO2 19* 20* 21*   BUN 49* 40* 33*   CREATININE 2.2* 1.5* 1.2   CALCIUM 8.7 8.5* 8.9   PHOS  --   --  2.5       Recent Labs     02/04/23  1809 02/05/23  0316 02/06/23  0513   PROT 6.9 6.5 6.2*   ALKPHOS 106 96 97   ALT 37 33 25   AST 55* 45* 28   BILITOT <0.2 0.3 0.9       Recent Labs     02/05/23  0909 02/05/23  1607 02/06/23  0513   INR 6.3* 2.9 1.6       No results for input(s): CKTOTAL, TROPONINI in the last 72 hours. Chronic labs:    Lab Results   Component Value Date    CHOL 197 08/19/2022    TRIG 202 (H) 08/19/2022    HDL 41 08/19/2022    LDLCALC 116 (H) 08/19/2022    TSH 0.965 09/20/2021    INR 1.6 02/06/2023    LABA1C 5.7 (H) 02/05/2023       Radiology: REVIEWED DAILY    Assessment:  Metabolic encephalopathy (resolved)  Supratherapeutic INR (resolved)  Acute kidney injury (resolved)  Abdominal bladder wall hematoma present on previous imaging in April 2022  Posterior subcutaneous right paramidline apparent complex fluid collection? Insulin-dependent diabetes mellitus  History of DVT status post IVC filter and on Coumadin  Plan:  Patient received 2 mg of vitamin K for supratherapeutic INR 6.6, will continue to hold Coumadin and follow INR in a.m. Continue Lokelma for hyperkalemia, monitor lab  General surgery note reviewed, currently no intervention as general surgery does not believe this to be air or fluid collection as patient remains afebrile with no leukocytosis, believe it to be a chronic capsule  Infectious disease note reviewed, currently holding off antibiotics  Echocardiogram reviewed EF currently 65%  Continue home medications  Monitor labs replete as indicated    DVT Prophylaxis [] Lovenox  []  Heparin [] DOAC [x] PCDs [] Ambulation    GI Prophylaxis [] PPI  [] H2 Blocker   [] Carafate  [x] Diet/Tube Feeds   Level of care [] Med/Surg  [x] Intermediate  []  ICU   Diet ADULT DIET; Regular; 3 carb choices (45 gm/meal); Low Potassium (Less than 3000 mg/day)    Family contact [x]  N/A    [] At bedside  [] Phone call   Disposition Patient requires continued admission currently awaiting retroperitoneal ultrasound, likely can discharge tomorrow   MDM [] Low    [x] Moderate  []   High       Discharge Plan:  To home    +++++++++++++++++++++++++++++++++++++++++++++++++  Lv Santos, [facility] [address] [facility] Physician - [address], [place]  +++++++++++++++++++++++++++++++++++++++++++++++++  NOTE: This report was transcribed using voice recognition software. Every effort was made to ensure accuracy; however, inadvertent computerized transcription errors may be present.

## 2023-02-06 NOTE — ACP (ADVANCE CARE PLANNING)
Advance Care Planning   Healthcare Decision Maker:    Primary Decision Maker: Huber Tobar - [phone]    Click here to complete Healthcare Decision Makers including selection of the Healthcare Decision Maker Relationship (ie \"Primary\").

## 2023-02-06 NOTE — PLAN OF CARE
-Reviewed chart for possible HF, at time of review patient does not have history of nor has been admitted for HF  -Advised to follow up with PCP post hospital discharge.

## 2023-02-06 NOTE — PROGRESS NOTES
GENERAL SURGERY  DAILY PROGRESS NOTE  2/6/2023    Chief Complaint   Patient presents with    Altered Mental Status     Patient son found patient confused in the car and just not acting himself . Patient alert and oriented during triage stating \"I'm just tired\". Unknown LKW. Hx of diabetes . Takes coumadin at home       Subjective:  Doing well. No acute issues overnight. Tolerating diet. No abdomen pain     Objective:  /77   Pulse 68   Temp 96.8 °F (36 °C) (Temporal)   Resp 20   Ht 6' 1\" (1.854 m)   Wt 239 lb 10.2 oz (108.7 kg)   SpO2 95%   BMI 31.62 kg/m²     GENERAL:  Laying in bed, awake, alert, cooperative, no apparent distress  LUNGS:  No increased work of breathing  CARDIOVASCULAR:  RR  ABDOMEN:  Soft, non-tender, non-distended  EXTREMITIES: No edema    Assessment/Plan:  50 y.o. male with posterior rectus sheath fluid collection is likely reactive from patient's retrorectus incisional hernia repair from May 2022    No air in fluid collection, remains afebrile and no leukocytosis-- do not recommend sticking a needle or drain in this fluid collection since it will likely recur as now there is a chronic capsule.   No other acute surgical intervention needed   Diet as tolerating     Electronically signed by Amy Salazar DO on 2/6/2023 at 6:59 AM

## 2023-02-06 NOTE — PROGRESS NOTES
Occupational Therapy  OCCUPATIONAL THERAPY INITIAL EVALUATION      AM[address] [address] [address]       Date:2023                                                  Patient Name: Armando Snyder  MRN: [id number]  : 1975  Room: [address] [place] [address]    Evaluating OT: Arlet Gordillo OTR/L #[id number]    Referring Provider[de-identified]  Nicki Epstein MD    Specific Provider Orders/Date: OT evaluation and treatment 23    Diagnosis:  SIMÓN (acute kidney injury) ([address] [N17.9]  Acute renal failure, unspecified acute renal failure type ([address] [N17.9]  Altered mental status, unspecified altered mental status type [R41.82]      Pertinent Medical History:  has a past medical history of Accident, Acute thrombosis of inferior vena cava ([address] SIMÓN (acute kidney injury) ([address] Allergic rhinitis, Blister of left leg, Cellulitis of leg, left, Chronic back pain, Depression, Dermatophytosis, Diabetes mellitus ([address] Difficulty sleeping, Displacement of lumbar intervertebral disc without myelopathy, Fibromyalgia, Fractured rib, H/O seasonal allergies, Head injury, History of deep vein thrombosis, Hyperlipidemia, Hypertension, 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[address] Lymphedema of left leg, Obesity (BMI 35.0-39.9 without comorbidity), Osteoarthritis, Recurrent acute deep vein thrombosis (DVT) of left lower extremity ([address] S/P IVC filter, Subtherapeutic international normalized ratio (INR), and Thoracic or lumbosacral neuritis or radiculitis, unspecified.        Precautions:  Fall Risk, hernandez,     Assessment of current deficits   [x] Functional mobility   [x]ADLs  [x] Strength               []Cognition   [x] Functional transfers   [] IADLs         [x] Safety Awareness   [x]Endurance   [x] Fine Coordination              [x] Balance      [] Vision/perception   [x]Sensation    []Gross Motor Coordination  [] ROM  [] Delirium                   [] Motor Control     OT PLAN OF CARE OT POC based on physician orders, patient diagnosis and results of clinical assessment    Frequency/Duration 1-3 days/wk for 2 weeks PRN   Specific OT Treatment to include:   * Instruction/training on adapted ADL techniques and AE recommendations to increase functional independence within precautions       * Functional transfer/mobility training/DME recommendations for increased independence, safety, and fall prevention  * Patient/Family education to increase follow through with safety techniques and functional independence  * Sensory re-education to improve body/limb awareness, maintain/improve skin integrity, and improve hand/UE motor function  * Therapeutic exercise to improve motor endurance, ROM, and functional strength for ADLs/functional transfers  * Therapeutic activities to facilitate/challenge dynamic balance, stand tolerance for increased safety and independence with ADLs  * Therapeutic activities to facilitate gross/fine motor skills for increased independence with ADLs  * Positioning to improve skin integrity, interaction with environment and functional independence      Recommended Adaptive Equipment:  TBD     Home Living:  Pt lives with son (who works most of the day) in a 3 story with 0+3 step(s) to enter and 1 rail(s); bed/bath on upper level or basement   Bathroom setup: tub shower with tub bench, BSC over commode,   Equipment owned: BSC, tub bench, ww, canes, w/c    Prior Level of Function:  mostly indep  with ADLs ,    Independent with IADLs.  Ambulated with no AD    Driving: not stated      Pain Level: 7/10 pain abdomen    Cognition: A&O: 4/4;   Follows multi step directions: good    Memory:  good    Sequencing:  good    Problem solving:  fair    Judgement/safety:  fair     Functional Assessment:   AM-PAC Daily Activity Raw Score: 17/24     Initial Eval Status  Date: 2/6/23 Treatment Status  Date: STG=LTG  Time frame: 10-14 days   Feeding Stand by Assist   Pt reporting he has to use non-dominant L hand d/t incoordination and numbness in R hand  Independent    Grooming Stand by Assist   Independent    UB Dressing Stand by Assist   Independent    LB Dressing Minimal Assist   Independent    Bathing Min/modAssist  Independent    Toileting Minimal Assist     Independent    Bed Mobility  Supine to sit: Stand by Assist   Sit to supine: Stand by Assist   Supine to sit: Independent   Sit to supine: Independent    Functional Transfers Sit to stand: Minimal Assist   Stand to sit: Stand by Assist   Stand pivot: Minimal Assist   Commode: min A  Independent    Functional Mobility Contact Guard Assist  with no AD, short distance in room  Independent    Functional distance   Balance Sitting: Supervision      Standing: Contact Guard Assist   Sitting: Independent       Standing: Independent    Activity Tolerance Poor+  good   Visual/  Perceptual Glasses: legally blind L eye  Perception: NT          BUE  ROM/Strength/  Fine motor Coordination Hand dominance: R, but not using d/t impaired sensation (chronic since 6/2021)    RUE: ROM WFL     Strength: grossly 3+/5      Strength: fair-     Coordination:  poor+ (chronic)    LUE: ROM  WFL     Strength: grossly 4/5      Strength:  WFL     Coordination: WFL       Hearing: WFL   Sensation:   c/o numbness or tingling R hand (since 6/2021)  Tone:  WFL   Edema: per chart, LLE lymphedema    Comments:   RN cleared patient for OT. Upon arrival, patient was in bed. Pt requesting to use bathroom commode. .  Therapist facilitated and instructed pt on adapted  techniques & compensatory strategies to improve safety and independence with basic ADLs, bed mobility,  functional transfers & functional mobility  to allow pt to achieve highest level of independence and safely. Patient presents with  decreased  ADLs,  bed mobility,  functional transfers,  functional mobility . At end of session, patient in bed with call light and phone within reach, all lines and tubes intact. Pt would benefit from continued skilled OT to increase safety and independence with completion of ADL tasks and functional mobility for improved quality of life. Treatment: OT treatment provided this date includes: ADL-  Instruction/training on safety and adapted techniques for completion of toileting, . Mobility-  Instruction/training on safety and improved independence with bed mobility , , functional transfers  and functional mobility with CGA for balance and safety. Rehab Potential: Good  for established goals     Patient / Family Goal:  Not stated    Patient and/or family were instructed on functional diagnosis, prognosis/goals and OT plan of care. Demonstrated fair understanding. Eval Complexity: Low    Time In: 10:10  Time Out: 1037  Total Treatment Time: 15 minutes    Min Units   OT Eval Low 96706  x     OT Eval Medium 43415      OT Eval High 24065       OT Re-Eval [id number]       Therapeutic Ex 34372       Therapeutic Activities 39497      ADL/Self Care 62989  15 1   Orthotic Management 20671       Neuro Re-Ed 77302       Non-Billable Time          Evaluation Time includes thorough review of current medical information, gathering information on past medical history/social history and prior level of function, completion of standardized testing/informal observation of tasks, assessment of data and education on plan of care and goals.             [place], [place] #[id number]

## 2023-02-06 NOTE — PROGRESS NOTES
Patient's hernandez removed with no complications. Patient due to void by 1900. Patient given a urinal. Will continue to monitor and assess.

## 2023-02-06 NOTE — HOME CARE
[address] [facility] referral received, awaiting final orders. Spoke with patient and verified demographics. Will follow. Carmine Lama LPN [address] [facility].

## 2023-02-06 NOTE — PLAN OF CARE
Problem: Chronic Conditions and Co-morbidities  Goal: Patient's chronic conditions and co-morbidity symptoms are monitored and maintained or improved  Outcome: Progressing     Problem: Discharge Planning  Goal: Discharge to home or other facility with appropriate resources  Outcome: Progressing     Problem: Confusion  Goal: Confusion, delirium, dementia, or psychosis is improved or at baseline  Description: INTERVENTIONS:  1. Assess for possible contributors to thought disturbance, including medications, impaired vision or hearing, underlying metabolic abnormalities, dehydration, psychiatric diagnoses, and notify attending LIP  2. [place] high risk fall precautions, as indicated  3. Provide frequent short contacts to provide reality reorientation, refocusing and direction  4. Decrease environmental stimuli, including noise as appropriate  5. Monitor and intervene to maintain adequate nutrition, hydration, elimination, sleep and activity  6. If unable to ensure safety without constant attention obtain sitter and review sitter guidelines with assigned personnel  7. Initiate Psychosocial CNS and Spiritual Care consult, as indicated  Outcome: Progressing     Problem: Safety - Adult  Goal: Free from fall injury  Outcome: Progressing     Problem: Pain  Goal: Verbalizes/displays adequate comfort level or baseline comfort level  Outcome: Progressing     Problem: Skin/Tissue Integrity  Goal: Absence of new skin breakdown  Description: 1. Monitor for areas of redness and/or skin breakdown  2. Assess vascular access sites hourly  3. Every 4-6 hours minimum:  Change oxygen saturation probe site  4. Every 4-6 hours:  If on nasal continuous positive airway pressure, respiratory therapy assess nares and determine need for appliance change or resting period.   Outcome: Progressing

## 2023-02-06 NOTE — PROGRESS NOTES
INPATIENT CARDIOLOGY FOLLOW-UP    Name: Claudean Gear    Age: 50 y.o. Date of Admission: 2/4/2023  5:48 PM    Date of Service: 2/6/2023    Chief Complaint: Follow-up for resolved altered mental status, abnormal troponin, resolving acute kidney injury, resolving hyperkalemia, supratherapeutic anticoagulation, venous thromboembolic disease, hypertension, moderate obesity    Interim History: The patient presently denies active cardiovascular symptoms and is hemodynamically stable with ongoing resolution of his acute kidney injury and hyperkalemia. Subtherapeutic anticoagulation is presently noted following reversal of earlier supratherapeutic anticoagulation. Review of Systems: The remainder of a complete multisystem review including consitutional, central nervous, respiratory, circulatory, gastrointestinal, genitourinary, endocrinologic, hematologic, musculoskeletal and psychiatric are negative.     Problem List:  Patient Active Problem List   Diagnosis    Neuropathic pain    Lumbar spondylosis    Osteoarthritis    Insomnia    Fibromyalgia    Vitamin D deficiency    Primary hypertension    Allergic rhinitis    Lumbar radiculopathy    Osteoarthritis of spine with radiculopathy, lumbar region    Class 1 obesity in adult    Lumbar disc herniation    Type 2 diabetes mellitus without complication, with long-term current use of insulin (HCC)    Primary osteoarthritis of left hip    Primary osteoarthritis of right hip    Neuropathy    Left leg swelling    Rectus diastasis    Recurrent unilateral inguinal hernia    Abnormal findings on diagnostic imaging of gall bladder    Cyst of spleen    Other pulmonary embolism without acute cor pulmonale (HCC)    Thrombocytosis    Acute blood loss anemia    Other hyperlipidemia    Tobacco dependence    Anticoagulated    Cellulitis of left lower extremity    Dermatophytosis    Lymphedema of left leg    Recurrent acute deep vein thrombosis (DVT) of left lower extremity ([facility] [address]    S/P IVC filter    History of deep vein thrombosis    Subtherapeutic international normalized ratio (INR)    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HCC)    SIMÓN (acute kidney injury) ([facility] [address]    Acute kidney injury (SIMÓN) with acute tubular necrosis (ATN) (HCC)    Incisional hernia of anterior abdominal wall without obstruction or gangrene    Hernia of abdominal wall    Acute epiglottitis with airway obstruction    Supratherapeutic INR    Class 2 severe obesity due to excess calories with serious comorbidity in adult [facility])    Elevated troponin    Anemia       Allergies:   Allergies   Allergen Reactions    Seasonal      HAYFEVER / sneezing,watery eyes    Tramadol Itching       Current Medications:  Current Facility-Administered Medications   Medication Dose Route Frequency Provider Last Rate Last Admin    [Held by provider] aspirin chewable tablet 81 mg  81 mg Oral Daily Fito Clarke MD        cholestyramine Marilynn Yoli) packet 4 g  1 packet Oral Daily Fito Clarke MD   4 g at 02/05/23 1403    [Held by provider] insulin glargine (LANTUS) injection vial 7 Units  7 Units SubCUTAneous Nightly Fito Clarke MD        gabapentin (NEURONTIN) capsule 300 mg  300 mg Oral Daily Fito Clarke MD   300 mg at 02/05/23 1248    0.9 % sodium chloride infusion   IntraVENous Continuous Bruna IGOR Pryor -  mL/hr at 02/05/23 1405 New Bag at 02/05/23 1405    0.9 % sodium chloride bolus  1,000 mL IntraVENous Once Wilfredo Cornell MD        fluticasone (FLONASE) 50 MCG/ACT nasal spray 1 spray  1 spray Each Nostril Daily Melba Hicks MD        metoprolol succinate (TOPROL XL) extended release tablet 50 mg  50 mg Oral Daily Samer Francisca Toney MD   50 mg at 02/05/23 1013    glucose chewable tablet 16 g  4 tablet Oral PRN Melba Hicks MD        dextrose bolus 10% 125 mL  125 mL IntraVENous PRN Melba Hicks MD        Or    dextrose bolus 10% 250 mL  250 mL IntraVENous PRN Mebla Hicks MD glucagon injection 1 mg  1 mg SubCUTAneous PRN Lena Dave MD        dextrose 10 % infusion   IntraVENous Continuous PRN Kannan Mcneill MD        sodium chloride flush 0.9 % injection 10 mL  10 mL IntraVENous 2 times per day Kannan Mcneill MD   10 mL at 02/05/23 1043    sodium chloride flush 0.9 % injection 10 mL  10 mL IntraVENous PRN Kannan Mcneill MD        0.9 % sodium chloride infusion   IntraVENous PRN Lena Dave MD        ondansetron (ZOFRAN-ODT) disintegrating tablet 4 mg  4 mg Oral Q8H PRN Kannan Mcneill MD        Or    [facility]ans[facility]ron [facility]) injection 4 mg  4 mg IntraVENous Q6H PRN Kannan Mcneill MD        magnesium hydroxide (MILK OF MAGNESIA) 400 MG/5ML suspension 30 mL  30 mL Oral Daily PRN Lena Dave MD        acetaminophen (TYLENOL) tablet 650 mg  650 mg Oral Q6H PRN Kannan Mcneill MD        Or    acetaminophen (TYLENOL) suppository 650 mg  650 mg Rectal Q6H PRN Kannan Mcneill MD        insulin lispro (HUMALOG) injection vial 0-4 Units  0-4 Units SubCUTAneous TID WC Kannan Mcneill MD        insulin lispro (HUMALOG) injection vial 0-4 Units  0-4 Units SubCUTAneous Nightly Kannan Mcneill MD          sodium chloride 100 mL/hr at 02/05/23 1405    dextrose      sodium chloride         Physical Exam:  /77   Pulse 68   Temp 96.8 °F (36 °C) (Temporal)   Resp 20   Ht 6' 1\" (1.854 m)   Wt 239 lb 10.2 oz (108.7 kg)   SpO2 95%   BMI 31.62 kg/m²   Weight change: Wt Readings from Last 3 Encounters:   02/06/23 239 lb 10.2 oz (108.7 kg)   10/21/22 273 lb 4.8 oz (124 kg)   08/19/22 295 lb (133.8 kg)     The patient is awake, alert and in no discomfort or distress. No gross musculoskeletal deformity is present. No significant skin or nail changes are present. Gross examination of head, eyes, nose and throat are negative. Jugular venous pressure is normal and no carotid bruits are present. Normal respiratory effort is noted with no accessory muscle usage present.  Lung fields are clear to ascultation. Cardiac examination is notable for a regular rate and rhythm with no palpable thrill. No gallop rhythm or cardiac murmur are identified. A benign abdominal examination is present with no masses or organomegaly. Intact pulses are present throughout all extremities and no peripheral edema is present. No focal neurologic deficits are present. Intake/Output:    Intake/Output Summary (Last 24 hours) at 2/6/2023 0944  Last data filed at 2/6/2023 0745  Gross per 24 hour   Intake --   Output 2150 ml   Net -2150 ml     I/O this shift:  In: -   Out: 400 [Urine:400]    Laboratory Tests:  Lab Results   Component Value Date    CREATININE 1.2 02/06/2023    BUN 33 (H) 02/06/2023     02/06/2023    K 4.7 02/06/2023    K 4.7 02/06/2023     02/06/2023    CO2 21 (L) 02/06/2023     No results for input(s): CKTOTAL, CKMB in the last 72 hours.     Invalid input(s): TROPONONI  No results found for: BNP  Lab Results   Component Value Date/Time    WBC 8.1 02/06/2023 05:13 AM    RBC 3.12 02/06/2023 05:13 AM    HGB 10.3 02/06/2023 05:13 AM    HCT 31.0 02/06/2023 05:13 AM    MCV 99.4 02/06/2023 05:13 AM    MCH 33.0 02/06/2023 05:13 AM    MCHC 33.2 02/06/2023 05:13 AM    RDW 20.9 02/06/2023 05:13 AM     02/06/2023 05:13 AM    MPV 9.3 02/06/2023 05:13 AM     Recent Labs     02/04/23  1809 02/05/23  0316 02/06/23  0513   ALKPHOS 106 96 97   ALT 37 33 25   AST 55* 45* 28   PROT 6.9 6.5 6.2*   BILITOT <0.2 0.3 0.9   LABALBU 4.2 3.9 3.7     Lab Results   Component Value Date/Time    MG 1.8 02/06/2023 05:13 AM     Lab Results   Component Value Date/Time    PROTIME 17.5 02/06/2023 05:13 AM    PROTIME 35.6 08/19/2022 01:02 PM    INR 1.6 02/06/2023 05:13 AM     Lab Results   Component Value Date/Time    TSH 0.965 09/20/2021 12:00 PM     No components found for: CHLPL  Lab Results   Component Value Date    TRIG 202 (H) 08/19/2022    TRIG 155 (H) 09/20/2021    TRIG 202 (H) 03/29/2021     Lab Results Component Value Date    HDL 41 08/19/2022    HDL 48 09/20/2021    HDL 38 03/29/2021     Lab Results   Component Value Date    LDLCALC 116 (H) 08/19/2022    [address] 09/20/2021    LDLCALC 76 03/29/2021       Cardiac Tests:  Telemetry findings reviewed: sinus rhythm, no new tachy/bradyarrhythmias overnight      ASSESSMENT / PLAN: On a clinical basis, the patient presently appears compensated from a cardiovascular standpoint remains hemodynamically stable with additional resolution of his acute kidney injury and hyperkalemia. He has repeat electrocardiogram is pending for assessment of left ventricular systolic function including that of the presence or absence of regional wall motion abnormalities in the face of his abnormal troponin levels, most likely related to that of his acute kidney injury. Continued monitoring of renal function and electrolytes will be necessary during optimization of medical management with echocardiographic findings assisting additional management decisions. Careful monitoring of anticoagulation will be necessary with goals of maintaining prothrombin times in range of 2.0-3.0 times INR control to appropriately reduce risk of embolic events in the face of his prior venous thromboembolic episode. Ongoing aggressive risk factor modification of blood pressure, diabetes and serum lipids will remain essential to reducing risk of future atherosclerotic development. Note: This report was completed utilizing computer voice recognition software. Every effort has been made to ensure accuracy, however; inadvertent computerized transcription errors may be present. [place] [facility].  Katharina Andrade, [address] [place] [address] Cardiology

## 2023-02-07 VITALS
RESPIRATION RATE: 18 BRPM | TEMPERATURE: 97.3 F | WEIGHT: 242.51 LBS | HEIGHT: 73 IN | SYSTOLIC BLOOD PRESSURE: 132 MMHG | BODY MASS INDEX: 32.14 KG/M2 | DIASTOLIC BLOOD PRESSURE: 73 MMHG | HEART RATE: 71 BPM | OXYGEN SATURATION: 99 %

## 2023-02-07 LAB
ANION GAP SERPL CALCULATED.3IONS-SCNC: 10 MMOL/L (ref 7–16)
BASOPHILS ABSOLUTE: 0.06 E9/L (ref 0–0.2)
BASOPHILS RELATIVE PERCENT: 0.7 % (ref 0–2)
BUN BLDV-MCNC: 17 MG/DL (ref 6–20)
CALCIUM SERPL-MCNC: 9.2 MG/DL (ref 8.6–10.2)
CHLORIDE BLD-SCNC: 107 MMOL/L (ref 98–107)
CO2: 23 MMOL/L (ref 22–29)
CREAT SERPL-MCNC: 1 MG/DL (ref 0.7–1.2)
EOSINOPHILS ABSOLUTE: 0.23 E9/L (ref 0.05–0.5)
EOSINOPHILS RELATIVE PERCENT: 2.6 % (ref 0–6)
GFR SERPL CREATININE-BSD FRML MDRD: >60 ML/MIN/1.73
GLUCOSE BLD-MCNC: 100 MG/DL (ref 74–99)
HCT VFR BLD CALC: 29.7 % (ref 37–54)
HEMOGLOBIN: 10.1 G/DL (ref 12.5–16.5)
IMMATURE GRANULOCYTES #: 0.05 E9/L
IMMATURE GRANULOCYTES %: 0.6 % (ref 0–5)
INR BLD: 1.2
LYMPHOCYTES ABSOLUTE: 1.65 E9/L (ref 1.5–4)
LYMPHOCYTES RELATIVE PERCENT: 18.7 % (ref 20–42)
MCH RBC QN AUTO: 32.3 PG (ref 26–35)
MCHC RBC AUTO-ENTMCNC: 34 % (ref 32–34.5)
MCV RBC AUTO: 94.9 FL (ref 80–99.9)
METER GLUCOSE: 104 MG/DL (ref 74–99)
METER GLUCOSE: 105 MG/DL (ref 74–99)
METER GLUCOSE: 90 MG/DL (ref 74–99)
MONOCYTES ABSOLUTE: 0.84 E9/L (ref 0.1–0.95)
MONOCYTES RELATIVE PERCENT: 9.5 % (ref 2–12)
NEUTROPHILS ABSOLUTE: 6.01 E9/L (ref 1.8–7.3)
NEUTROPHILS RELATIVE PERCENT: 67.9 % (ref 43–80)
PDW BLD-RTO: 19.7 FL (ref 11.5–15)
PLATELET # BLD: 355 E9/L (ref 130–450)
PMV BLD AUTO: 9.6 FL (ref 7–12)
POTASSIUM REFLEX MAGNESIUM: 4.1 MMOL/L (ref 3.5–5)
PROTHROMBIN TIME: 13.4 SEC (ref 9.3–12.4)
RBC # BLD: 3.13 E12/L (ref 3.8–5.8)
SODIUM BLD-SCNC: 140 MMOL/L (ref 132–146)
WBC # BLD: 8.8 E9/L (ref 4.5–11.5)

## 2023-02-07 PROCEDURE — 6370000000 HC RX 637 (ALT 250 FOR IP): Performed by: FAMILY MEDICINE

## 2023-02-07 PROCEDURE — 6370000000 HC RX 637 (ALT 250 FOR IP): Performed by: INTERNAL MEDICINE

## 2023-02-07 PROCEDURE — 36415 COLL VENOUS BLD VENIPUNCTURE: CPT

## 2023-02-07 PROCEDURE — 85610 PROTHROMBIN TIME: CPT

## 2023-02-07 PROCEDURE — 85025 COMPLETE CBC W/AUTO DIFF WBC: CPT

## 2023-02-07 PROCEDURE — 2580000003 HC RX 258: Performed by: INTERNAL MEDICINE

## 2023-02-07 PROCEDURE — 82962 GLUCOSE BLOOD TEST: CPT

## 2023-02-07 PROCEDURE — 80048 BASIC METABOLIC PNL TOTAL CA: CPT

## 2023-02-07 PROCEDURE — 99232 SBSQ HOSP IP/OBS MODERATE 35: CPT | Performed by: INTERNAL MEDICINE

## 2023-02-07 PROCEDURE — 6370000000 HC RX 637 (ALT 250 FOR IP)

## 2023-02-07 RX ORDER — LISINOPRIL 10 MG/1
5 TABLET ORAL DAILY
Status: DISCONTINUED | OUTPATIENT
Start: 2023-02-07 | End: 2023-02-07 | Stop reason: HOSPADM

## 2023-02-07 RX ADMIN — ACETAMINOPHEN 650 MG: 325 TABLET ORAL at 09:42

## 2023-02-07 RX ADMIN — FLUTICASONE PROPIONATE 1 SPRAY: 50 SPRAY, METERED NASAL at 08:43

## 2023-02-07 RX ADMIN — CHOLESTYRAMINE 4 G: 4 POWDER, FOR SUSPENSION ORAL at 08:43

## 2023-02-07 RX ADMIN — GABAPENTIN 300 MG: 300 CAPSULE ORAL at 08:43

## 2023-02-07 RX ADMIN — METOPROLOL SUCCINATE 50 MG: 50 TABLET, EXTENDED RELEASE ORAL at 08:43

## 2023-02-07 RX ADMIN — LISINOPRIL 5 MG: 10 TABLET ORAL at 13:32

## 2023-02-07 RX ADMIN — SODIUM CHLORIDE, PRESERVATIVE FREE 10 ML: 5 INJECTION INTRAVENOUS at 08:43

## 2023-02-07 ASSESSMENT — PAIN DESCRIPTION - ORIENTATION: ORIENTATION: RIGHT;LEFT

## 2023-02-07 ASSESSMENT — PAIN DESCRIPTION - LOCATION: LOCATION: HEAD

## 2023-02-07 ASSESSMENT — PAIN DESCRIPTION - DESCRIPTORS: DESCRIPTORS: ACHING;DISCOMFORT;DULL

## 2023-02-07 ASSESSMENT — PAIN SCALES - GENERAL
PAINLEVEL_OUTOF10: 0
PAINLEVEL_OUTOF10: 9

## 2023-02-07 ASSESSMENT — PAIN - FUNCTIONAL ASSESSMENT: PAIN_FUNCTIONAL_ASSESSMENT: PREVENTS OR INTERFERES SOME ACTIVE ACTIVITIES AND ADLS

## 2023-02-07 NOTE — PROGRESS NOTES
INPATIENT CARDIOLOGY FOLLOW-UP    Name: Jana Rivas    Age: 50 y.o. Date of Admission: 2/4/2023  5:48 PM    Date of Service: 2/7/2023    Chief Complaint: Follow-up for altered mental status, abnormal troponin, resolved acute kidney injury and hyperkalemia, venous thromboembolic disease, hypertension, moderate obesity    Interim History: The patient presently remains compensated from cardiovascular standpoint with no active cardiovascular symptoms. His echocardiogram demonstrated evidence of a normal-sized left ventricular chamber with no regional wall motion abnormalities and normal left ventricular systolic function the face of his abnormal troponin level as previously outlined likely on the basis of his renal function with stable renal function and electrolytes. Additional assessment of his anticoagulation has not been completed nor his anticoagulation been resumed in the face of present subtherapeutic levels. Review of Systems: The remainder of a complete multisystem review including consitutional, central nervous, respiratory, circulatory, gastrointestinal, genitourinary, endocrinologic, hematologic, musculoskeletal and psychiatric are negative.     Problem List:  Patient Active Problem List   Diagnosis    Neuropathic pain    Lumbar spondylosis    Osteoarthritis    Insomnia    Fibromyalgia    Vitamin D deficiency    Primary hypertension    Allergic rhinitis    Lumbar radiculopathy    Osteoarthritis of spine with radiculopathy, lumbar region    Class 1 obesity in adult    Lumbar disc herniation    Type 2 diabetes mellitus without complication, with long-term current use of insulin ([facility])    Primary osteoarthritis of left hip    Primary osteoarthritis of right hip    Neuropathy    Left leg swelling    Rectus diastasis    Recurrent unilateral inguinal hernia    Abnormal findings on diagnostic imaging of gall bladder    Cyst of spleen    Other pulmonary embolism without acute cor pulmonale ([facility] [address] Thrombocytosis    Acute blood loss anemia    Other hyperlipidemia    Tobacco dependence    Anticoagulated    Cellulitis of left lower extremity    Dermatophytosis    Lymphedema of left leg    Recurrent acute deep vein thrombosis (DVT) of left lower extremity (HCC)    S/P IVC filter    History of deep vein thrombosis    Subtherapeutic international normalized ratio (INR)    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HCC)    SIMÓN (acute kidney injury) ([address]    Acute kidney injury (SIMÓN) with acute tubular necrosis (ATN) (HCC)    Incisional hernia of anterior abdominal wall without obstruction or gangrene    Hernia of abdominal wall    Acute epiglottitis with airway obstruction    Supratherapeutic INR    Class 2 severe obesity due to excess calories with serious comorbidity in [facility])    Elevated troponin    Anemia    Intra-abdominal fluid collection    Acute renal failure (HCC)       Allergies:   Allergies   Allergen Reactions    Seasonal      HAYFEVER / sneezing,watery eyes    Tramadol Itching       Current Medications:  Current Facility-Administered Medications   Medication Dose Route Frequency Provider Last Rate Last Admin    [Held by provider] aspirin chewable tablet 81 mg  81 mg Oral Daily Oriana Klein MD        cholestyramine Brynda Suly) packet 4 g  1 packet Oral Daily Oriana Klein MD   4 g at 02/07/23 0843    [Held by provider] insulin glargine (LANTUS) injection vial 7 Units  7 Units SubCUTAneous Nightly Oriana Klein MD        gabapentin (NEURONTIN) capsule 300 mg  300 mg Oral Daily Oriana Klein MD   300 mg at 02/07/23 0843    0.9 % sodium chloride bolus  1,000 mL IntraVENous Once Elizabeth Gordon MD        fluticasone (FLONASE) 50 MCG/ACT nasal spray 1 spray  1 spray Each Nostril Daily Kannan Davis MD   1 spray at 02/07/23 0843    metoprolol succinate (TOPROL XL) extended release tablet 50 mg  50 mg Oral Daily Hansr Asuncion Davis MD   50 mg at 02/07/23 0843    glucose chewable tablet 16 g 4 tablet Oral PRN Kannan Mcneill MD        dextrose bolus 10% 125 mL  125 mL IntraVENous PRN Kannan Mcneill MD        Or    dextrose bolus 10% 250 mL  250 mL IntraVENous PRN Kannan Mcneill MD        glucagon injection 1 mg  1 mg SubCUTAneous PRN Lena Dave MD        dextrose 10 % infusion   IntraVENous Continuous PRN Kannan Mcneill MD        sodium chloride flush 0.9 % injection 10 mL  10 mL IntraVENous 2 times per day Kannan Mcneill MD   10 mL at 02/07/23 0843    sodium chloride flush 0.9 % injection 10 mL  10 mL IntraVENous PRN Kannan Mcneill MD        0.9 % sodium chloride infusion   IntraVENous PRN Lena Dave MD        ondansetron (ZOFRAN-ODT) disintegrating tablet 4 mg  4 mg Oral Q8H PRN Kannan Mcneill MD        Or    [facility]) injection 4 mg  4 mg IntraVENous Q6H PRN Kannan Mcneill MD        magnesium hydroxide (MILK OF MAGNESIA) 400 MG/5ML suspension 30 mL  30 mL Oral Daily PRN Lena Dave MD        acetaminophen (TYLENOL) tablet 650 mg  650 mg Oral Q6H PRN Kannan Mcneill MD        Or    acetaminophen (TYLENOL) suppository 650 mg  650 mg Rectal Q6H PRN Kannan Mcneill MD        insulin lispro (HUMALOG) injection vial 0-4 Units  0-4 Units SubCUTAneous TID WC Kannan Mcneill MD        insulin lispro (HUMALOG) injection vial 0-4 Units  0-4 Units SubCUTAneous Nightly Kannan Mcneill MD          dextrose      sodium chloride         Physical Exam:  BP (!) 159/94   Pulse 83   Temp 98 °F (36.7 °C) (Temporal)   Resp 18   Ht 6' 1\" (1.854 m)   Wt 242 lb 8.1 oz (110 kg)   SpO2 99%   BMI 31.99 kg/m²   Weight change: -35 lb 7.9 oz (-16.1 kg)  Wt Readings from Last 3 Encounters:   02/07/23 242 lb 8.1 oz (110 kg)   10/21/22 273 lb 4.8 oz (124 kg)   08/19/22 295 lb (133.8 kg)     The patient is awake, alert and in no discomfort or distress. No gross musculoskeletal deformity is present. No significant skin or nail changes are present.  Gross examination of head, eyes, nose and throat are negative. Jugular venous pressure is normal and no carotid bruits are present. Normal respiratory effort is noted with no accessory muscle usage present. Lung fields are clear to ascultation. Cardiac examination is notable for a regular rate and rhythm with no palpable thrill. No gallop rhythm or cardiac murmur are identified. A benign abdominal examination is present with the exception of obesity and no masses or organomegaly. Intact pulses are present throughout all extremities and no peripheral edema is present. No focal neurologic deficits are present. Intake/Output:  No intake or output data in the 24 hours ending 02/07/23 0855  No intake/output data recorded. Laboratory Tests:  Lab Results   Component Value Date    CREATININE 1.0 02/07/2023    BUN 17 02/07/2023     02/07/2023    K 4.1 02/07/2023     02/07/2023    CO2 23 02/07/2023     No results for input(s): CKTOTAL, CKMB in the last 72 hours.     Invalid input(s): TROPONONI  No results found for: BNP  Lab Results   Component Value Date/Time    WBC 8.8 02/07/2023 05:50 AM    RBC 3.13 02/07/2023 05:50 AM    HGB 10.1 02/07/2023 05:50 AM    HCT 29.7 02/07/2023 05:50 AM    MCV 94.9 02/07/2023 05:50 AM    MCH 32.3 02/07/2023 05:50 AM    MCHC 34.0 02/07/2023 05:50 AM    RDW 19.7 02/07/2023 05:50 AM     02/07/2023 05:50 AM    MPV 9.6 02/07/2023 05:50 AM     Recent Labs     02/04/23  1809 02/05/23  0316 02/06/23  0513   ALKPHOS 106 96 97   ALT 37 33 25   AST 55* 45* 28   PROT 6.9 6.5 6.2*   BILITOT <0.2 0.3 0.9   LABALBU 4.2 3.9 3.7     Lab Results   Component Value Date/Time    MG 1.8 02/06/2023 05:13 AM     Lab Results   Component Value Date/Time    PROTIME 17.5 02/06/2023 05:13 AM    PROTIME 35.6 08/19/2022 01:02 PM    INR 1.6 02/06/2023 05:13 AM     Lab Results   Component Value Date/Time    TSH 0.965 09/20/2021 12:00 PM     No components found for: CHLPL  Lab Results   Component Value Date    TRIG 202 (H) 08/19/2022 TRIG 155 (H) 09/20/2021    TRIG 202 (H) 03/29/2021     Lab Results   Component Value Date    HDL 41 08/19/2022    HDL 48 09/20/2021    HDL 38 03/29/2021     Lab Results   Component Value Date    LDLCALC 116 (H) 08/19/2022    [address] 70 09/20/2021    LDLCALC 76 03/29/2021       Cardiac Tests:  Telemetry findings reviewed: sinus rhythm, no new tachy/bradyarrhythmias overnight  Last Echocardiogram: An echocardiogram demonstrates evidence of a normal-sized left ventricular chamber with normal left ventricular systolic function and no significant valvular abnormalities      ASSESSMENT / PLAN: On a clinical basis, the patient presently appears compensated for cardiovascular standpoint and presently clinically euvolemic with resolution of his acute kidney injury and hyperkalemia. His echocardiogram continues to demonstrate evidence of normal left ventricular systolic function with no significant valvular abnormalities. No additional cardiovascular assessment is presently anticipated with needs of continued careful monitoring of his anticoagulation following correction and ongoing goals of maintaining prothrombin times in range of 2.0-3.0 times INR control to reduce risk of embolic events in the face of his venous thromboembolic events. Ongoing aggressive risk factor modification of blood pressure, diabetes and serum lipids will remain essential to reducing risk of future atherosclerotic development. We will further evaluate him during hospitalization should additional cardiovascular difficulties or concerns arise with needs arrangement of follow-up with his primary cardiologist, Stephanie Choudhury at time of hospital follow-up as appropriate. Note: This report was completed utilizing computer voice recognition software. Every effort has been made to ensure accuracy, however; inadvertent computerized transcription errors may be present. Carlos Tavares.  Gara Boxer, [facility]

## 2023-02-07 NOTE — DISCHARGE SUMMARY
Hospitalist Discharge Summary    Patient ID: Clair Davis II   Patient : 1975  Patient's PCP: Kim Brothers PA-C    Admit Date: 2023   Admitting Physician: Reji Hernandez MD    Discharge Date:  2023   Discharge Physician: IGOR Rico CNP   Discharge Condition: Stable  Discharge Disposition: [facility] course in brief:  (Please refer to daily progress notes for a comprehensive review of the hospitalization by requesting medical records)  Briefly, patient with PMH significant for DVT status post IVC filter on Coumadin. Type 2 diabetes independent, legally blind in left eye, HPT, HPL, fibromyalgia, presented to ED for altered mental status. Patient was found to have SIMÓN with a creatinine of 3.0. Additionally was found to have supratherapeutic INR on presentation of 6.1. Potassium elevated at 3.5. CO2 19 conservative met metabolic acidosis however anion gap was not elevated at 15. CT of head was negative for any acute findings, CT abdomen showed no urinary obstruction however bladder wall thickening and ventral ill-defined soft tissue mass suspicious for hematoma partially involved in the rectus sheath was appreciated. General surgery was consulted and does not believe that any intervention needs done, signed off. Cardiology was consulted echocardiogram was ordered showed normal EF. Infectious disease was consulted as of right now patient is off antibiotics and patient had retroperitoneal ultrasound that was read as unremarkable. Nephrology, infectious disease, cardiology and general surgery were all consulted and all signed off on case. Patient stable for discharge from medical perspective.         Consults:   IP CONSULT TO NEPHROLOGY  IP CONSULT TO INTERNAL MEDICINE  IP CONSULT TO GENERAL SURGERY  IP CONSULT TO CARDIOLOGY  IP CONSULT TO INFECTIOUS DISEASES  TEST MOCK CON71    Discharge Diagnoses:        Discharge Instructions / Follow up:    No future appointments. The patient's condition is stable. At this time the patient is without objective evidence of an acute process requiring continuing hospitalization or inpatient management. They are stable for discharge with outpatient follow-up. I have spoken with the patient and discussed the results of the current hospitalization, in addition to providing specific details for the plan of care and counseling regarding the diagnosis and prognosis. The plan has been discussed in detail and they are aware of the specific conditions for emergent return, as well as the importance of follow-up. Their questions are answered at this time and they are agreeable with the plan for discharge to home. Continued appropriate risk factor modification of blood pressure, diabetes and serum lipids will remain essential to reducing risk of future atherosclerotic development    Activity: activity as tolerated    Physical exam:  General appearance: No apparent distress, appears stated age and cooperative. HEENT: Normal cephalic, atraumatic without obvious deformity. Pupils equal, round, and reactive to light. Extra ocular muscles intact. Conjunctivae/corneas clear. Neck: Supple, with full range of motion. No jugular venous distention. Trachea midline. Respiratory:  Clear to auscultation bilaterally. No apparent distress. Cardiovascular:  Regular rate and rhythm. S1, S2 without murmurs, rubs, or gallops. PV: Brisk capillary refill. +2 pedal and radial pulses bilaterally. No clubbing, cyanosis, edema of bilateral lower extremities. Abdomen: Soft, non-tender, non-distended. +BS  Musculoskeletal: No obvious deformities or erythematous or edematous joints. Skin: Normal skin color. No rashes or lesions. Neurologic:  Neurovascularly intact without any focal sensory/motor deficits.  Cranial nerves: II-XII intact, grossly non-focal.  Psychiatric: Alert and oriented, thought content appropriate, normal insight    Significant labs:  CBC:   Recent Labs     02/05/23  0316 02/05/23  1214 02/06/23  0513 02/07/23  0550   WBC 8.2  --  8.1 8.8   RBC 3.13*  --  3.12* 3.13*   HGB 10.0* 10.1* 10.3* 10.1*   HCT 30.1* 30.1* 31.0* 29.7*   MCV 96.2  --  99.4 94.9   RDW 21.4*  --  20.9* 19.7*     --  334 355     BMP:   Recent Labs     02/05/23  2041 02/06/23  0513 02/07/23  0550    138 140   K 4.9 4.7  4.7 4.1   * 106 107   CO2 20* 21* 23   BUN 40* 33* 17   CREATININE 1.5* 1.2 1.0   MG  --  1.8  --    PHOS  --  2.5  --      LFT:  Recent Labs     02/04/23  1809 02/05/23  0316 02/06/23  0513   PROT 6.9 6.5 6.2*   ALKPHOS 106 96 97   ALT 37 33 25   AST 55* 45* 28   BILITOT <0.2 0.3 0.9     PT/INR:   Recent Labs     02/05/23  0909 02/05/23  1608 02/06/23  0513   INR 6.3* 2.9 1.6     BNP: No results for input(s): BNP in the last 72 hours. Hgb A1C:   Lab Results   Component Value Date    LABA1C 5.7 (H) 02/05/2023     Folate and B12: No results found for: [place], No results found for: FOLATE  Thyroid Studies:   Lab Results   Component Value Date    TSH 0.965 09/20/2021       Urinalysis:    Lab Results   Component Value Date/Time    NITRU Negative 02/04/2023 09:18 PM    WBCUA 0-1 02/04/2023 09:18 PM    BACTERIA FEW 02/04/2023 09:18 PM    RBCUA NONE 02/04/2023 09:18 PM    BLOODU SMALL 02/04/2023 09:18 PM    SPECGRAV >=1.030 02/04/2023 09:18 PM    GLUCOSEU Negative 02/04/2023 09:18 PM       Imaging:  CT ABDOMEN PELVIS WO CONTRAST Additional Contrast? None    Result Date: 2/4/2023  EXAMINATION: CT OF THE ABDOMEN AND PELVIS WITHOUT CONTRAST 2/4/2023 7:22 pm TECHNIQUE: CT of the abdomen and pelvis was performed without the administration of intravenous contrast. Multiplanar reformatted images are provided for review. Automated exposure control, iterative reconstruction, and/or weight based adjustment of the mA/kV was utilized to reduce the radiation dose to as low as reasonably achievable.  COMPARISON: 4-22 HISTORY: ORDERING SYSTEM PROVIDED HISTORY: urinary obstruction TECHNOLOGIST PROVIDED HISTORY: Reason for exam:->urinary obstruction Additional Contrast?->None Decision Support Exception - unselect if not a suspected or confirmed emergency medical condition->Emergency Medical Condition (MA) What reading provider will be dictating this exam?->CRC FINDINGS: Lower Chest: No infiltrate or effusion in the visible lower chest. Organs: No acute noncontrast organ abnormality is identified. GI/Bowel:   Apparent right hemicolectomy. Constipation suggested Pelvis: There is ventral bladder wall thickening. Soft tissue indistinct mass area is ventral to this on the right. The evaluation is limited without contrast and due to beam hardening artifacts Peritoneum/Retroperitoneum:   Varices noted in the abdomen and pelvis. Left iliac venous stents and IVC, which is diminutive at the infrarenal location, filter are present. Bones/Soft Tissues:   Posterior subcutaneous fluid at the sacrococcygeal level asymmetric to the right may represent seroma, cannot exclude other etiologies such as abscess or hematoma, on the order of 6 cm     1. No urinary obstruction is seen 2. However there is anterior bladder wall thickening and ventral to this on the right ill-defined soft tissue mass area which is suspicious for hematoma partially involving rectus sheath although was also present previously to indicate recurrence or residual, or the possibility of other etiology 3. Posterior subcutaneous right paramidline apparent complex fluid collection RECOMMENDATIONS: Clinical correlation.  IV contrasted exam may be helpful to further evaluate, or MRI, as clinically warranted     CT HEAD WO CONTRAST    Result Date: 2/4/2023  EXAMINATION: CT OF THE HEAD WITHOUT CONTRAST  2/4/2023 7:22 pm TECHNIQUE: CT of the head was performed without the administration of intravenous contrast. Automated exposure control, iterative reconstruction, and/or weight based adjustment of the mA/kV was utilized to reduce the radiation dose to as low as reasonably achievable. COMPARISON: None. HISTORY: ORDERING SYSTEM PROVIDED HISTORY: Evaluate intracranial abnormality TECHNOLOGIST PROVIDED HISTORY: Has a \"code stroke\" or \"stroke alert\" been called? ->No Reason for exam:->Evaluate intracranial abnormality Decision Support Exception - unselect if not a suspected or confirmed emergency medical condition->Emergency Medical Condition (MA) What reading provider will be dictating this exam?->CRC FINDINGS: BRAIN/VENTRICLES: There is no acute intracranial hemorrhage, mass effect or midline shift. No abnormal extra-axial fluid collection. The gray-white differentiation is maintained without evidence of an acute infarct. There is no evidence of hydrocephalus. ORBITS: The visualized portion of the orbits demonstrate no acute abnormality. SINUSES: The visualized paranasal sinuses and mastoid air cells demonstrate no acute abnormality. SOFT TISSUES/SKULL:  No acute abnormality of the visualized skull or soft tissues. No acute intracranial abnormality. XR CHEST PORTABLE    Result Date: 2/4/2023  EXAMINATION: ONE XRAY VIEW OF THE CHEST 2/4/2023 6:00 pm COMPARISON: September 8, 2021 HISTORY: ORDERING SYSTEM PROVIDED HISTORY: pna TECHNOLOGIST PROVIDED HISTORY: Reason for exam:->pna What reading provider will be dictating this exam?->CRC FINDINGS: Lungs are normally expanded. No infiltrates, consolidates or pleural effusions are seen. Heart has upper size. There is unremarkable appearance for the mediastinum. There is no perihilar vascular congestion. There is no pneumothorax the right or on the left. There is a right scoliotic curvature the thoracic spine with spondylosis. No acute cardiopulmonary process.      US RETROPERITONEAL COMPLETE    Result Date: 2/5/2023  EXAMINATION: RETROPERITONEAL ULTRASOUND OF THE KIDNEYS AND URINARY BLADDER 2/5/2023 COMPARISON: None HISTORY: [address] [place] [address] HISTORY: SIMÓN TECHNOLOGIST PROVIDED HISTORY: Reason for exam:->SIMÓN What reading provider will be dictating this exam?->CRC FINDINGS: Kidneys: Right kidney: 13.2 x 4.9 x 4.8 cm; cortical thickness 17.5 mm. Left kidney: 12.8 x 5.2 x 6 cm; cortical thickness 17.8 mm. . Kidneys demonstrate normal cortical echogenicity. No evidence of hydronephrosis or intrarenal stones. Bladder: Bladder not evaluated. The bladder was empty, there is a Langford lumen. Unremarkable ultrasound of the kidneys. Bladder not evaluate on this study. . RECOMMENDATIONS: Unavailable       Discharge Medications:      Medication List        CONTINUE taking these medications      aspirin 81 MG chewable tablet  Take 1 tablet by mouth daily     * cholestyramine 4 g packet  Commonly known as: QUESTRAN     * cholestyramine 4 g packet  Commonly known as: QUESTRAN  Take 1 packet by mouth daily     fluticasone 50 MCG/ACT nasal spray  Commonly known as: FLONASE  instill 1 spray into each nostril once daily IN THE EVENING     FreeStyle Robert 14 Day Sensor Misc  1 Device by Does not apply route continuous     gabapentin 300 MG capsule  Commonly known as: NEURONTIN  Take 1 capsule by mouth 3 times daily for 180 days. Intended supply: 30 days     insulin glargine 100 UNIT/ML injection vial  Commonly known as: LANTUS     insulin glargine-yfgn 100 UNIT/ML injection vial  Commonly known as: SEMGLEE-YFGN  Inject 7 Units into the skin nightly     lisinopril 5 MG tablet  Commonly known as: PRINIVIL;ZESTRIL  take 1 tablet by mouth once daily     metoprolol succinate 50 MG extended release tablet  Commonly known as: TOPROL XL  Take 1 tablet by mouth daily     warfarin 5 MG tablet  Commonly known as: COUMADIN  Take as directed. If you are unsure how to take this medication, talk to your nurse or doctor. Original instructions: Take 1 tablet by mouth daily           * This list has 2 medication(s) that are the same as other medications prescribed for you.  Read the directions carefully, and ask your doctor or other care provider to review them with you. STOP taking these medications      loperamide 2 MG capsule  Commonly known as: IMODIUM              Time Spent on discharge is more than 45 minutes in the examination, evaluation, counseling and review of medications and discharge plan.    +++++++++++++++++++++++++++++++++++++++++++++++++  IGOR Christy [address]  +++++++++++++++++++++++++++++++++++++++++++++++++  NOTE: This report was transcribed using voice recognition software. Every effort was made to ensure accuracy; however, inadvertent computerized transcription errors may be present.

## 2023-02-07 NOTE — PROGRESS NOTES
[facility] Roberto A. Delene Baxter, MD Bernabe Livings, MD Douglass Shows, MD Bevely Brittle, BETTY Nam, NAHOMY King CNP  Progress Note    2/7/2023    SUBJECTIVE:   2/6: Seen while laying in bed watching television. Feels slightly better today. Ongoing diarrhea continues. He tells me that he goes 4-5 times throughout the day. Denies chest pain, dyspnea, or palpitations. His appetite is good. 2/7: Seen while laying in bed. Complains of a headache. Otherwise ROS is unremarkable. Appetite is good. Plan is for discharge later today. PROBLEM LIST:    Principal Problem:    SIMÓN (acute kidney injury) ([facility] [address]  Active Problems:    Supratherapeutic INR    Elevated troponin    Anemia    Intra-abdominal fluid collection    Acute renal failure ([facility] [address]    Primary hypertension  Resolved Problems:    * No resolved hospital problems. *         DIET:    ADULT DIET; Regular; 3 carb choices (45 gm/meal);  Low Potassium (Less than 3000 mg/day)     MEDS (scheduled):    lisinopril  5 mg Oral Daily    [Held by provider] aspirin  81 mg Oral Daily    cholestyramine  1 packet Oral Daily    [Held by provider] insulin glargine  7 Units SubCUTAneous Nightly    gabapentin  300 mg Oral Daily    sodium chloride  1,000 mL IntraVENous Once    fluticasone  1 spray Each Nostril Daily    metoprolol succinate  50 mg Oral Daily    sodium chloride flush  10 mL IntraVENous 2 times per day    insulin lispro  0-4 Units SubCUTAneous TID WC    insulin lispro  0-4 Units SubCUTAneous Nightly       MEDS (infusions):   dextrose      sodium chloride         MEDS (prn):  glucose, dextrose bolus **OR** dextrose bolus, glucagon (rDNA), dextrose, sodium chloride flush, sodium chloride, ondansetron **OR** ondansetron, magnesium hydroxide, acetaminophen **OR** acetaminophen    PHYSICAL EXAM:     Patient Vitals for the past 24 hrs:   BP Temp Temp src Pulse Resp SpO2 Weight   02/07/23 0838 (!) 159/94 98 °F (36.7 °C) Temporal 83 18 99 % --   02/07/23 0229 -- -- -- -- -- -- 242 lb 8.1 oz (110 kg)   02/06/23 1945 (!) 147/84 96.9 °F (36.1 °C) Temporal 84 18 97 % --     @      Intake/Output Summary (Last 24 hours) at 2/7/2023 1251  Last data filed at 2/7/2023 3814  Gross per 24 hour   Intake 240 ml   Output --   Net 240 ml           Wt Readings from Last 3 Encounters:   02/07/23 242 lb 8.1 oz (110 kg)   10/21/22 273 lb 4.8 oz (124 kg)   08/19/22 295 lb (133.8 kg)       Constitutional:  in no acute distress  HEENT: NC/AT, EOMI, sclera and conjunctiva are clear and anicteric, mucus membranes moist  Neck: Trachea midline, no JVD  Cardiovascular: S1, S2 regular rhythm, no murmur,or rub  Respiratory:  CTAB, diminished in the bases. No crackles, no wheeze  Gastrointestinal:  Soft, nontender, nondistended, NABS  Ext: trace dependent edema, feet warm  Skin: dry, no rash  Neuro: awake, alert, interactive      DATA:    Recent Labs     02/05/23 0316 02/05/23  1214 02/06/23  0513 02/07/23  0550   WBC 8.2  --  8.1 8.8   HGB 10.0* 10.1* 10.3* 10.1*   HCT 30.1* 30.1* 31.0* 29.7*   MCV 96.2  --  99.4 94.9     --  334 355       Recent Labs     02/04/23  1809 02/05/23 0316 02/05/23  1214 02/05/23  2041 02/06/23  0513 02/07/23  0550    137   < > 139 138 140   K 5.3* 5.4*   < > 4.9 4.7  4.7 4.1    106   < > 111* 106 107   CO2 19* 18*   < > 20* 21* 23   MG  --   --   --   --  1.8  --    PHOS  --   --   --   --  2.5  --    BUN 59* 59*   < > 40* 33* 17   CREATININE 3.9* 3.0*   < > 1.5* 1.2 1.0   ALT 37 33  --   --  25  --    AST 55* 45*  --   --  28  --    BILITOT <0.2 0.3  --   --  0.9  --    ALKPHOS 106 96  --   --  97  --     < > = values in this interval not displayed.          Lab Results   Component Value Date    LABPROT 0.1 02/05/2023    LABPROT 0.1 02/05/2023       ASSESSMENT :  1.  SIMÓN, hemodynamically mediated due to volume contraction associated with poor oral intake in the setting of ongoing loose to watery diarrhea, which represents his baseline since the colectomy. The urine indices are of little clinical utility given the time which they were sent (after he received quite a bit of IV normal saline). Azotemia improving with IV fluid resuscitation. 2.  Hyperkalemia due to SIMÓN, and decreased effective circulating volume     3. Anemia, normocytic     4. Metabolic acidosis, non-anion gap, secondary to combination of SIMÓN and diarrhea    Still with diarrhea, though he states this is chronic for him. He is much more alert today and reports that he is eating and drinking plenty of fluids. He was started on cholestyramine yesterday.     Creatinine 1.2 mg/dL     Plan:  Resume lisinopril 5 mg daily   CMP in 2 weeks  Follow up with Dr. Shari Torres in 2-3 weeks     Electronically signed by IGOR Mcnulty CNP on 2/7/2023 at 12:51 PM

## 2023-02-07 NOTE — PLAN OF CARE
Problem: Chronic Conditions and Co-morbidities  Goal: Patient's chronic conditions and co-morbidity symptoms are monitored and maintained or improved  Outcome: Progressing     Problem: Discharge Planning  Goal: Discharge to home or other facility with appropriate resources  Outcome: Progressing     Problem: Confusion  Goal: Confusion, delirium, dementia, or psychosis is improved or at baseline  Description: INTERVENTIONS:  1. Assess for possible contributors to thought disturbance, including medications, impaired vision or hearing, underlying metabolic abnormalities, dehydration, psychiatric diagnoses, and notify attending LIP  2. [place] high risk fall precautions, as indicated  3. Provide frequent short contacts to provide reality reorientation, refocusing and direction  4. Decrease environmental stimuli, including noise as appropriate  5. Monitor and intervene to maintain adequate nutrition, hydration, elimination, sleep and activity  6. If unable to ensure safety without constant attention obtain sitter and review sitter guidelines with assigned personnel  7. Initiate Psychosocial CNS and Spiritual Care consult, as indicated  Outcome: Progressing     Problem: Safety - Adult  Goal: Free from fall injury  Outcome: Progressing     Problem: Pain  Goal: Verbalizes/displays adequate comfort level or baseline comfort level  Outcome: Progressing     Problem: Skin/Tissue Integrity  Goal: Absence of new skin breakdown  Description: 1. Monitor for areas of redness and/or skin breakdown  2. Assess vascular access sites hourly  3. Every 4-6 hours minimum:  Change oxygen saturation probe site  4. Every 4-6 hours:  If on nasal continuous positive airway pressure, respiratory therapy assess nares and determine need for appliance change or resting period.   Outcome: Progressing

## 2023-02-07 NOTE — CARE COORDINATION
SOCIAL WORK/CASEMANAGEMENT TRANSITION OF CARE PLANNING( [address] [place] [address], [address]):  met with pt in the room this a.m. complaining of a headache. Plan is home with [facility] and orders are  in place and I have called to tell them that pt has a discharge order in today.  OSMAN Green  2/7/2023

## 2023-02-07 NOTE — PLAN OF CARE
Problem: Discharge Planning  Goal: Discharge to home or other facility with appropriate resources  2/7/2023 1600 by Oksana Soliz RN  Outcome: Adequate for Discharge  2/7/2023 0205 by Meryle Heman, RN  Outcome: Progressing     Problem: Confusion  Goal: Confusion, delirium, dementia, or psychosis is improved or at baseline  Description: INTERVENTIONS:  1. Assess for possible contributors to thought disturbance, including medications, impaired vision or hearing, underlying metabolic abnormalities, dehydration, psychiatric diagnoses, and notify attending LIP  2. [place] high risk fall precautions, as indicated  3. Provide frequent short contacts to provide reality reorientation, refocusing and direction  4. Decrease environmental stimuli, including noise as appropriate  5. Monitor and intervene to maintain adequate nutrition, hydration, elimination, sleep and activity  6. If unable to ensure safety without constant attention obtain sitter and review sitter guidelines with assigned personnel  7.  Initiate Psychosocial CNS and Spiritual Care consult, as indicated  2/7/2023 1600 by Oksana Soliz RN  Outcome: Adequate for Discharge  2/7/2023 0205 by Meryle Heman, RN  Outcome: Progressing     Problem: Safety - Adult  Goal: Free from fall injury  2/7/2023 1600 by Oksana Soliz RN  Outcome: Adequate for Discharge  2/7/2023 0205 by Meryle Heman, RN  Outcome: Progressing     Problem: Pain  Goal: Verbalizes/displays adequate comfort level or baseline comfort level  2/7/2023 1600 by Oksana Soliz RN  Outcome: Adequate for Discharge  2/7/2023 0205 by Meryle Heman, RN  Outcome: Progressing

## 2023-02-08 ENCOUNTER — TELEPHONE (OUTPATIENT)
Dept: CARDIOLOGY CLINIC | Age: 48
End: 2023-02-08

## 2023-02-08 ENCOUNTER — TELEPHONE (OUTPATIENT)
Dept: PRIMARY CARE CLINIC | Age: 48
End: 2023-02-08

## 2023-02-08 NOTE — TELEPHONE ENCOUNTER
Primary care. Had mildly elevated troponin in the context of acute renal failure. May follow-up as needed.

## 2023-02-08 NOTE — TELEPHONE ENCOUNTER
..Care Transitions Initial Follow Up Call    Outreach made within 2 business days of discharge: Yes    Patient: Genesis Proctor II Patient : 1975   MRN: [id number]  Reason for Admission: There are no discharge diagnoses documented for the most recent discharge. Discharge Date: 23       Spoke with: no answer left message to call for appt      Follow Up  No future appointments.     Elliot Jefferson

## 2023-02-09 ENCOUNTER — TELEPHONE (OUTPATIENT)
Dept: PRIMARY CARE CLINIC | Age: 48
End: 2023-02-09

## 2023-02-09 NOTE — TELEPHONE ENCOUNTER
Contacted patient with follow-up recommendations per Dr. Irene Joseph. Patient verbalized understanding.

## 2023-02-09 NOTE — TELEPHONE ENCOUNTER
..Care Transitions Initial Follow Up Call    Outreach made within 2 business days of discharge: Yes    Patient: Genesis Proctor II Patient : 1975   MRN: [id number]  Reason for Admission:SIMÓN  Discharge Date: 23       Spoke with: Left message on voice mail to call back    Scheduled appointment with PCP within 7-14 days    Follow Up  No future appointments.     Bruna Keepers

## 2023-02-10 LAB
BLOOD CULTURE, ROUTINE: NORMAL
CULTURE, BLOOD 2: NORMAL

## 2023-02-20 DIAGNOSIS — G62.9 PERIPHERAL POLYNEUROPATHY: ICD-10-CM

## 2023-02-20 RX ORDER — GABAPENTIN 300 MG/1
CAPSULE ORAL
Qty: 270 CAPSULE | Refills: 0 | Status: SHIPPED | OUTPATIENT
Start: 2023-02-20 | End: 2023-05-21

## 2023-02-22 ENCOUNTER — OFFICE VISIT (OUTPATIENT)
Dept: PRIMARY CARE CLINIC | Age: 48
End: 2023-02-22

## 2023-02-22 VITALS
WEIGHT: 260 LBS | TEMPERATURE: 97.4 F | HEIGHT: 73 IN | OXYGEN SATURATION: 96 % | BODY MASS INDEX: 34.46 KG/M2 | HEART RATE: 103 BPM | SYSTOLIC BLOOD PRESSURE: 110 MMHG | DIASTOLIC BLOOD PRESSURE: 70 MMHG

## 2023-02-22 DIAGNOSIS — E11.65 TYPE 2 DIABETES MELLITUS WITH HYPERGLYCEMIA, WITHOUT LONG-TERM CURRENT USE OF INSULIN (HCC): ICD-10-CM

## 2023-02-22 DIAGNOSIS — Z79.01 ANTICOAGULATED: ICD-10-CM

## 2023-02-22 DIAGNOSIS — Z09 HOSPITAL DISCHARGE FOLLOW-UP: ICD-10-CM

## 2023-02-22 DIAGNOSIS — N17.9 ACUTE RENAL FAILURE, UNSPECIFIED ACUTE RENAL FAILURE TYPE (HCC): Primary | ICD-10-CM

## 2023-02-22 LAB
INTERNATIONAL NORMALIZATION RATIO, POC: 1.4
PROTHROMBIN TIME, POC: 17

## 2023-02-22 RX ORDER — FLASH GLUCOSE SCANNING READER
1 EACH MISCELLANEOUS CONTINUOUS
Qty: 1 EACH | Refills: 0 | Status: SHIPPED | OUTPATIENT
Start: 2023-02-22

## 2023-02-22 RX ORDER — FLASH GLUCOSE SENSOR
1 KIT MISCELLANEOUS CONTINUOUS
Qty: 4 EACH | Refills: 5 | Status: SHIPPED | OUTPATIENT
Start: 2023-02-22

## 2023-02-22 NOTE — PROGRESS NOTES
e Post-Discharge Transitional Care  Follow Up      Juan Diego Barry II   YOB: 1975    Date of Office Visit:  2/22/2023  Date of Hospital Admission: 2/4/23  Date of Hospital Discharge: 2/7/23  Risk of hospital readmission (high >=14%. Medium >=10%) :Readmission Risk Score: 11.6      Care management risk score Rising risk (score 2-5) and Complex Care (Scores >=6): No Risk Score On File     Non face to face  following discharge, date last encounter closed (first attempt may have been earlier): 02/09/2023    Call initiated 2 business days of discharge: Yes    ASSESSMENT/PLAN:   Acute renal failure, unspecified acute renal failure type [facility])  Hospital discharge follow-up  -     PA DISCHARGE MEDS RECONCILED W/ CURRENT OUTPATIENT MED LIST  Anticoagulated  -     POCT INR  -increase to 7.5 mg for 5 days, then back to 5 mg daily  -trying to get her a home pt machine    Type 2 diabetes mellitus with hyperglycemia, without long-term current use of insulin (HCC)  -     Continuous Blood Gluc  (FREESTYLE JOANIE 2 READER) SAMARIA; 1 Device by Does not apply route continuous, Disp-1 each, R-0Normal  -     Continuous Blood Gluc Sensor (FREESTYLE JOANIE 2 SENSOR) MISC; 1 Device by Does not apply route continuous, Disp-4 each, R-5Normal    Medical Decision Making: moderate complexity  Return in 1 month (on 3/22/2023). On this date 2/22/2023 I have spent 30 minutes reviewing previous notes, test results and face to face with the patient discussing the diagnosis and importance of compliance with the treatment plan as well as documenting on the day of the visit. Subjective:   HPI:  Follow up of Hospital problems/diagnosis(es): acute renal failure, resolved. Cr back to 1.0. diarrhea resolved. Coumadin was supra therapeutic, inr 6, down to 1. 6. he is taking coumadin 5 mg daily. Inpatient course: Discharge summary reviewed- see chart. Interval history/Current status: stable.      Patient Active Problem List Diagnosis    Neuropathic pain    Lumbar spondylosis    Osteoarthritis    Insomnia    Fibromyalgia    Vitamin D deficiency    Primary hypertension    Allergic rhinitis    Lumbar radiculopathy    Osteoarthritis of spine with radiculopathy, lumbar region    Class 1 obesity in adult    Lumbar disc herniation    Type 2 diabetes mellitus without complication, with long-term current use of insulin (HCC)    Primary osteoarthritis of left hip    Primary osteoarthritis of right hip    Neuropathy    Left leg swelling    Rectus diastasis    Recurrent unilateral inguinal hernia    Abnormal findings on diagnostic imaging of gall bladder    Cyst of spleen    Other pulmonary embolism without acute cor pulmonale (HCC)    Thrombocytosis    Acute blood loss anemia    Other hyperlipidemia    Tobacco dependence    Anticoagulated    Cellulitis of left lower extremity    Dermatophytosis    Lymphedema of left leg    Recurrent acute deep vein thrombosis (DVT) of left lower extremity (HCC)    S/P IVC filter    History of deep vein thrombosis    Subtherapeutic international normalized ratio (INR)    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HCC)    SIMÓN (acute kidney injury) ([facility] [address]    Acute kidney injury (SIMÓN) with acute tubular necrosis (ATN) ([facility])    Incisional hernia of anterior abdominal wall without obstruction or gangrene    Hernia of abdominal wall    Acute epiglottitis with airway obstruction    Supratherapeutic INR    Class 2 severe obesity due to excess calories with serious comorbidity in adult [facility])    Elevated troponin    Anemia    Intra-abdominal fluid collection    Acute renal failure ([address]       Medications listed as ordered at the time of discharge from hospital     Medication List            Accurate as of February 22, 2023  4:45 PM. If you have any questions, ask your nurse or doctor.                 START taking these medications      FreeStyle Robert 2 Dalton Lianet  1 Device by Does not apply route continuous  Started by: Thaddeus Alvarenga MOMO            CHANGE how you take these medications      * FreeStyle Robert 14 Day Sensor Misc  1 Device by Does not apply route continuous  What changed: Another medication with the same name was added. Make sure you understand how and when to take each. Changed by: Elpidio Cowden, PA-C     * FreeStyle Robert 2 Sensor Misc  1 Device by Does not apply route continuous  What changed: You were already taking a medication with the same name, and this prescription was added. Make sure you understand how and when to take each. Changed by: Elpidio Cowden, PA-C           * This list has 2 medication(s) that are the same as other medications prescribed for you. Read the directions carefully, and ask your doctor or other care provider to review them with you. CONTINUE taking these medications      aspirin 81 MG chewable tablet  Take 1 tablet by mouth daily     * cholestyramine 4 g packet  Commonly known as: QUESTRAN     * cholestyramine 4 g packet  Commonly known as: QUESTRAN  Take 1 packet by mouth daily     fluticasone 50 MCG/ACT nasal spray  Commonly known as: FLONASE  instill 1 spray into each nostril once daily IN THE EVENING     gabapentin 300 MG capsule  Commonly known as: NEURONTIN  take 1 capsule by mouth three times a day     insulin glargine 100 UNIT/ML injection vial  Commonly known as: LANTUS     insulin glargine-yfgn 100 UNIT/ML injection vial  Commonly known as: SEMGLEE-YFGN  Inject 7 Units into the skin nightly     lisinopril 5 MG tablet  Commonly known as: PRINIVIL;ZESTRIL  take 1 tablet by mouth once daily     metoprolol succinate 50 MG extended release tablet  Commonly known as: TOPROL XL  Take 1 tablet by mouth daily     warfarin 5 MG tablet  Commonly known as: COUMADIN  Take as directed by the anticoagulation clinic. If you are unsure how to take this medication, talk to your nurse or doctor. Original instructions:  Take 1 tablet by mouth daily           * This list has 2 medication(s) that are the same as other medications prescribed for you. Read the directions carefully, and ask your doctor or other care provider to review them with you.                    Where to Get Your Medications        These medications were sent to [address] [address]  [address] [address] [place] [address]      Phone: [phone]   FreeStyle [place] [address] [place] [address] [facility] 2 Sensor Misc           Medications marked \"taking\" at this time  Outpatient Medications Marked as Taking for the 2/22/23 encounter (Office Visit) with Rebecca Lemon PA-C   Medication Sig Dispense Refill    Continuous Blood Gluc  (FREESTYLE JOANIE 2 READER) SAMARIA 1 Device by Does not apply route continuous 1 each 0    Continuous Blood Gluc Sensor (FREESTYLE JOANIE 2 SENSOR) MISC 1 Device by Does not apply route continuous 4 each 5    gabapentin (NEURONTIN) 300 MG capsule take 1 capsule by mouth three times a day 270 capsule 0    fluticasone (FLONASE) 50 MCG/ACT nasal spray instill 1 spray into each nostril once daily IN THE EVENING 16 g 2    warfarin (COUMADIN) 5 MG tablet Take 1 tablet by mouth daily 30 tablet 1    metoprolol succinate (TOPROL XL) 50 MG extended release tablet Take 1 tablet by mouth daily 30 tablet 3    cholestyramine (QUESTRAN) 4 g packet Take 1 packet by mouth daily 90 packet 3    insulin glargine-yfgn (SEMGLEE-YFGN) 100 UNIT/ML injection vial Inject 7 Units into the skin nightly 10 each 0    insulin glargine (LANTUS) 100 UNIT/ML injection vial Inject 7 Units into the skin nightly      cholestyramine (QUESTRAN) 4 g packet Take 1 packet by mouth daily      lisinopril (PRINIVIL;ZESTRIL) 5 MG tablet take 1 tablet by mouth once daily 90 tablet 1    Continuous Blood Gluc Sensor (FREESTYLE JOANIE 14 DAY SENSOR) MISC 1 Device by Does not apply route continuous 4 each 2    aspirin 81 MG chewable tablet Take 1 tablet by mouth daily 30 tablet 3 Medications patient taking as of now reconciled against medications ordered at time of hospital discharge: Yes    A comprehensive review of systems was negative except for what was noted in the HPI.     Objective:    /70   Pulse (!) 103   Temp 97.4 °F (36.3 °C) (Temporal)   Ht 6' 1\" (1.854 m)   Wt 260 lb (117.9 kg)   SpO2 96%   BMI 34.30 kg/m²   General Appearance: alert and oriented to person, place and time, well developed and well- nourished, in no acute distress  Skin: warm and dry, no rash or erythema  Head: normocephalic and atraumatic  Eyes: pupils equal, round, and reactive to light, extraocular eye movements intact, conjunctivae normal  ENT: tympanic membrane, external ear and ear canal normal bilaterally, nose without deformity, nasal mucosa and turbinates normal without polyps  Neck: supple and non-tender without mass, no thyromegaly or thyroid nodules, no cervical lymphadenopathy  Pulmonary/Chest: clear to auscultation bilaterally- no wheezes, rales or rhonchi, normal air movement, no respiratory distress  Cardiovascular: normal rate, regular rhythm, normal S1 and S2, no murmurs, rubs, clicks, or gallops, distal pulses intact, no carotid bruits  Abdomen: soft, non-tender, non-distended, normal bowel sounds, no masses or organomegaly  Extremities: no cyanosis, clubbing or edema  Musculoskeletal: normal range of motion, no joint swelling, deformity or tenderness  Neurologic: reflexes normal and symmetric, no cranial nerve deficit, gait, coordination and speech normal      An electronic signature was used to authenticate this note.  --Braney Mason PA-C

## 2023-02-24 NOTE — PROGRESS NOTES
is  negative.   Palpation of the achilles tendon reveals no tenderness  Silfverskiold test is  negative for Gastroc/Achilles tightness     LABS:  CBC with Differential:    Lab Results   Component Value Date    WBC 5.5 07/01/2019    RBC 4.27 07/01/2019    HGB 12.9 07/01/2019    HCT 37.3 07/01/2019     07/01/2019    MCV 87.4 07/01/2019    MCH 30.2 07/01/2019    MCHC 34.6 07/01/2019    RDW 12.2 07/01/2019    SEGSPCT 80 11/26/2013    LYMPHOPCT 16.1 06/30/2019    MONOPCT 7.6 06/30/2019    BASOPCT 0.6 06/30/2019    MONOSABS 0.72 06/30/2019    LYMPHSABS 1.53 06/30/2019    EOSABS 0.45 06/30/2019    BASOSABS 0.06 06/30/2019     Platelets:    Lab Results   Component Value Date     07/01/2019     Hemoglobin/Hematocrit:    Lab Results   Component Value Date    HGB 12.9 07/01/2019    HCT 37.3 07/01/2019     CMP:    Lab Results   Component Value Date     07/01/2019    K 4.2 07/01/2019    CL 98 07/01/2019    CO2 24 07/01/2019    BUN 10 07/01/2019    CREATININE 1.1 07/05/2019    GFRAA >60 07/05/2019    LABGLOM >60 07/05/2019    GLUCOSE 184 07/01/2019    GLUCOSE 125 05/11/2012    PROT 7.3 06/30/2019    LABALBU 4.1 06/30/2019    LABALBU 4.8 05/11/2012    CALCIUM 9.0 07/01/2019    BILITOT 0.4 06/30/2019    ALKPHOS 125 06/30/2019    AST 15 06/30/2019    ALT 20 06/30/2019       Scheduled Meds:   vancomycin  2,500 mg Intravenous Q12H    aspirin  81 mg Oral Daily    DULoxetine  30 mg Oral Daily    fenofibrate  54 mg Oral Daily    fluticasone  2 spray Nasal Daily    lisinopril  20 mg Oral Daily    pravastatin  20 mg Oral Nightly    pregabalin  300 mg Oral BID    sodium chloride flush  10 mL Intravenous 2 times per day    enoxaparin  40 mg Subcutaneous Daily    cefepime  2 g Intravenous Q12H       Continuous Infusions:    PRN Meds:.cyclobenzaprine, sodium chloride flush, magnesium hydroxide, ondansetron, oxyCODONE-acetaminophen    DIAGNOSTICS:  Xr Foot Left (min 3 Views)    Result Date: 6/30/2019  Patient MRN: [id number] : 1975 Age:  37 years Gender: Male Order Date: 2019 11:15 AM Exam: XR FOOT LEFT (MIN 3 VIEWS) Number of Images: 3 views Indication:   swelling swelling Comparison: None. Findings: There is no evidence of bone or joint abnormality seen in the left foot. There is no evidence of fracture or dislocation. The soft tissues appear normal.     NO ACUTE FRACTURE OR DISLOCATION LEFT FOOT. Us Dup Lower Extremity Left Jamaal    Result Date: 2019  Real-time and Doppler sonography of the deep venous structures of the left lower extremity. Grayscale, color Doppler, and spectral Doppler analysis. There is adequate and spontaneous blood flow, compressibility and augmentation within the left common femoral, superficial femoral, popliteal, posterior tibial, anterior tibial and peroneal veins. No evidence for deep vein thrombosis of the left lower extremity. Assessment  -Post-op infection left foot  -S/p left tarsal tunnel release DOS 19    Plan  - Patient was examined and evaluated. - Left foot is improving, less erythema and edema. No purulence or serous fluid expressed. White count and vitals are WNL. Patient is less tender to region.  - WBAT to the LLE. - follow up with Dr. Dustin Hagan DPM, outpatient  - Appreciate ID recs: PO doxy/augmentin X 3 weeks, f/u in outpatient.  - Stable for discharge from a podiatric standpoint.   - Discussed patient with Dr. Dustin Hagan DPM.  - Will continue to follow patient while in-house.     Electronically signed by Jenise Garcia on 2019 at 11:51 AM Family

## 2023-02-27 DIAGNOSIS — E11.9 TYPE 2 DIABETES MELLITUS WITHOUT COMPLICATION, WITH LONG-TERM CURRENT USE OF INSULIN (HCC): Primary | ICD-10-CM

## 2023-02-27 DIAGNOSIS — Z79.4 TYPE 2 DIABETES MELLITUS WITHOUT COMPLICATION, WITH LONG-TERM CURRENT USE OF INSULIN (HCC): Primary | ICD-10-CM

## 2023-02-27 RX ORDER — BLOOD-GLUCOSE TRANSMITTER
1 EACH MISCELLANEOUS CONTINUOUS
Qty: 1 EACH | Refills: 0 | Status: SHIPPED | OUTPATIENT
Start: 2023-02-27

## 2023-02-27 RX ORDER — BLOOD-GLUCOSE SENSOR
1 EACH MISCELLANEOUS CONTINUOUS
Qty: 4 EACH | Refills: 5 | Status: SHIPPED | OUTPATIENT
Start: 2023-02-27

## 2023-02-27 RX ORDER — BLOOD-GLUCOSE,RECEIVER,CONT
1 EACH MISCELLANEOUS CONTINUOUS
Qty: 1 EACH | Refills: 0 | Status: SHIPPED | OUTPATIENT
Start: 2023-02-27

## 2023-03-07 PROBLEM — R79.89 ELEVATED TROPONIN: Status: RESOLVED | Noted: 2023-02-05 | Resolved: 2023-03-07

## 2023-03-07 PROBLEM — R77.8 ELEVATED TROPONIN: Status: RESOLVED | Noted: 2023-02-05 | Resolved: 2023-03-07

## 2023-03-08 ENCOUNTER — ANTI-COAG VISIT (OUTPATIENT)
Dept: PRIMARY CARE CLINIC | Age: 48
End: 2023-03-08
Payer: MEDICARE

## 2023-03-08 DIAGNOSIS — Z79.01 LONG TERM (CURRENT) USE OF ANTICOAGULANTS: Primary | ICD-10-CM

## 2023-03-08 LAB — INR BLD: 4.3

## 2023-03-08 PROCEDURE — 93793 ANTICOAG MGMT PT WARFARIN: CPT | Performed by: INTERNAL MEDICINE

## 2023-03-08 PROCEDURE — 93793 ANTICOAG MGMT PT WARFARIN: CPT | Performed by: PHYSICIAN ASSISTANT

## 2023-03-09 NOTE — PROGRESS NOTES
Spoke to son at 10:30 am to give father message to call office for results and med change because pt phone not working.

## 2023-03-15 NOTE — PROGRESS NOTES
Patient phoned back stating he lost his  phone x 5 days. He states \"the addi who checks his INR told him to hold coumadin x 4 days and then recheck today\". Told patient we will have to know the INR result from today to let him know new directions on how to take coumadin.

## 2023-03-18 LAB — INR BLD: 1.7

## 2023-03-20 ENCOUNTER — ANTI-COAG VISIT (OUTPATIENT)
Dept: PRIMARY CARE CLINIC | Age: 48
End: 2023-03-20
Payer: MEDICARE

## 2023-03-20 DIAGNOSIS — Z79.01 LONG TERM (CURRENT) USE OF ANTICOAGULANTS: Primary | ICD-10-CM

## 2023-03-20 PROCEDURE — 93793 ANTICOAG MGMT PT WARFARIN: CPT | Performed by: PHYSICIAN ASSISTANT

## 2023-03-20 RX ORDER — METOPROLOL SUCCINATE 50 MG/1
TABLET, EXTENDED RELEASE ORAL
Qty: 30 TABLET | Refills: 3 | OUTPATIENT
Start: 2023-03-20

## 2023-03-23 LAB
INR BLD: NORMAL
PROTIME: NORMAL

## 2023-03-24 ENCOUNTER — ANTI-COAG VISIT (OUTPATIENT)
Dept: PRIMARY CARE CLINIC | Age: 48
End: 2023-03-24

## 2023-03-24 DIAGNOSIS — Z79.01 LONG TERM (CURRENT) USE OF ANTICOAGULANTS: Primary | ICD-10-CM

## 2023-03-24 LAB — INR BLD: 4.1

## 2023-03-24 PROCEDURE — 93793 ANTICOAG MGMT PT WARFARIN: CPT | Performed by: PHYSICIAN ASSISTANT

## 2023-03-24 RX ORDER — CHOLESTYRAMINE 4 G/9G
1 POWDER, FOR SUSPENSION ORAL DAILY
Qty: 90 PACKET | Refills: 1 | Status: SHIPPED | OUTPATIENT
Start: 2023-03-24

## 2023-03-24 RX ORDER — SYRINGE AND NEEDLE,INSULIN,1ML 31 GX5/16"
SYRINGE, EMPTY DISPOSABLE MISCELLANEOUS
Qty: 100 EACH | Refills: 3 | Status: SHIPPED | OUTPATIENT
Start: 2023-03-24

## 2023-03-24 RX ORDER — METOPROLOL SUCCINATE 50 MG/1
50 TABLET, EXTENDED RELEASE ORAL DAILY
Qty: 30 TABLET | Refills: 0 | Status: SHIPPED | OUTPATIENT
Start: 2023-03-24

## 2023-03-24 RX ORDER — SYRINGE AND NEEDLE,INSULIN,1ML 31 GX5/16"
SYRINGE, EMPTY DISPOSABLE MISCELLANEOUS
COMMUNITY
Start: 2023-02-01 | End: 2023-03-24 | Stop reason: SDUPTHER

## 2023-03-24 RX ORDER — INSULIN GLARGINE 100 [IU]/ML
25 INJECTION, SOLUTION SUBCUTANEOUS NIGHTLY
Qty: 90 ML | Refills: 1 | Status: SHIPPED | OUTPATIENT
Start: 2023-03-24

## 2023-03-31 ENCOUNTER — TELEPHONE (OUTPATIENT)
Dept: PRIMARY CARE CLINIC | Age: 48
End: 2023-03-31

## 2023-03-31 ENCOUNTER — ANTI-COAG VISIT (OUTPATIENT)
Dept: PRIMARY CARE CLINIC | Age: 48
End: 2023-03-31
Payer: MEDICARE

## 2023-03-31 DIAGNOSIS — Z79.01 LONG TERM (CURRENT) USE OF ANTICOAGULANTS: Primary | ICD-10-CM

## 2023-03-31 DIAGNOSIS — N52.9 ERECTILE DYSFUNCTION, UNSPECIFIED ERECTILE DYSFUNCTION TYPE: Primary | ICD-10-CM

## 2023-03-31 LAB — INR BLD: 2.1

## 2023-03-31 PROCEDURE — 93793 ANTICOAG MGMT PT WARFARIN: CPT | Performed by: PHYSICIAN ASSISTANT

## 2023-03-31 RX ORDER — SILDENAFIL 50 MG/1
50 TABLET, FILM COATED ORAL DAILY PRN
Qty: 20 TABLET | Refills: 2 | Status: SHIPPED | OUTPATIENT
Start: 2023-03-31

## 2023-03-31 NOTE — TELEPHONE ENCOUNTER
Pt calling for rx for viagra 50mg he has used in past send rx to [facility] in [place] he is requesting 5 because he is self pay

## 2023-04-18 DIAGNOSIS — I10 ESSENTIAL HYPERTENSION: ICD-10-CM

## 2023-04-19 RX ORDER — LISINOPRIL 5 MG/1
TABLET ORAL
Qty: 90 TABLET | Refills: 1 | Status: SHIPPED | OUTPATIENT
Start: 2023-04-19

## 2023-04-19 RX ORDER — METOPROLOL SUCCINATE 50 MG/1
TABLET, EXTENDED RELEASE ORAL
Qty: 30 TABLET | Refills: 0 | Status: SHIPPED | OUTPATIENT
Start: 2023-04-19

## 2023-04-21 ENCOUNTER — ANTI-COAG VISIT (OUTPATIENT)
Dept: PRIMARY CARE CLINIC | Age: 48
End: 2023-04-21

## 2023-04-21 LAB — INR BLD: 5.6

## 2023-04-26 ENCOUNTER — TELEPHONE (OUTPATIENT)
Dept: PRIMARY CARE CLINIC | Age: 48
End: 2023-04-26

## 2023-04-26 ENCOUNTER — ANTI-COAG VISIT (OUTPATIENT)
Dept: PRIMARY CARE CLINIC | Age: 48
End: 2023-04-26
Payer: MEDICARE

## 2023-04-26 DIAGNOSIS — Z79.01 LONG TERM (CURRENT) USE OF ANTICOAGULANTS: Primary | ICD-10-CM

## 2023-04-26 LAB — INR BLD: 5.6

## 2023-04-26 PROCEDURE — 93793 ANTICOAG MGMT PT WARFARIN: CPT | Performed by: PHYSICIAN ASSISTANT

## 2023-04-26 RX ORDER — WARFARIN SODIUM 2 MG/1
2 TABLET ORAL DAILY
Qty: 30 TABLET | Refills: 3 | Status: SHIPPED | OUTPATIENT
Start: 2023-04-26

## 2023-04-28 ENCOUNTER — ANTI-COAG VISIT (OUTPATIENT)
Dept: PRIMARY CARE CLINIC | Age: 48
End: 2023-04-28

## 2023-04-28 DIAGNOSIS — Z79.01 ANTICOAGULATED: Primary | ICD-10-CM

## 2023-04-28 LAB — INR BLD: 1.1

## 2023-05-01 ENCOUNTER — ANTI-COAG VISIT (OUTPATIENT)
Dept: PRIMARY CARE CLINIC | Age: 48
End: 2023-05-01
Payer: MEDICARE

## 2023-05-01 DIAGNOSIS — Z79.01 LONG TERM (CURRENT) USE OF ANTICOAGULANTS: Primary | ICD-10-CM

## 2023-05-01 LAB — INR BLD: 1.3

## 2023-05-01 PROCEDURE — 93793 ANTICOAG MGMT PT WARFARIN: CPT | Performed by: PHYSICIAN ASSISTANT

## 2023-05-04 LAB — INR BLD: 2

## 2023-05-05 ENCOUNTER — ANTI-COAG VISIT (OUTPATIENT)
Dept: PRIMARY CARE CLINIC | Age: 48
End: 2023-05-05
Payer: MEDICARE

## 2023-05-05 DIAGNOSIS — Z79.01 LONG TERM (CURRENT) USE OF ANTICOAGULANTS: Primary | ICD-10-CM

## 2023-05-05 PROCEDURE — 93793 ANTICOAG MGMT PT WARFARIN: CPT | Performed by: PHYSICIAN ASSISTANT

## 2023-05-11 ENCOUNTER — ANTI-COAG VISIT (OUTPATIENT)
Dept: PRIMARY CARE CLINIC | Age: 48
End: 2023-05-11
Payer: MEDICARE

## 2023-05-11 DIAGNOSIS — M54.16 LUMBAR RADICULOPATHY: ICD-10-CM

## 2023-05-11 DIAGNOSIS — M79.2 NEUROPATHIC PAIN: ICD-10-CM

## 2023-05-11 DIAGNOSIS — Z79.01 ANTICOAGULATED: Primary | ICD-10-CM

## 2023-05-11 LAB — INR BLD: 1.2

## 2023-05-11 PROCEDURE — 93793 ANTICOAG MGMT PT WARFARIN: CPT | Performed by: PHYSICIAN ASSISTANT

## 2023-05-11 RX ORDER — CYCLOBENZAPRINE HCL 10 MG
10 TABLET ORAL 3 TIMES DAILY PRN
Qty: 270 TABLET | Refills: 2 | Status: SHIPPED | OUTPATIENT
Start: 2023-05-11

## 2023-05-19 ENCOUNTER — ANTI-COAG VISIT (OUTPATIENT)
Dept: PRIMARY CARE CLINIC | Age: 48
End: 2023-05-19
Payer: MEDICARE

## 2023-05-19 DIAGNOSIS — Z79.01 ANTICOAGULATED: Primary | ICD-10-CM

## 2023-05-19 LAB — INR BLD: 2.8

## 2023-05-19 PROCEDURE — 93793 ANTICOAG MGMT PT WARFARIN: CPT | Performed by: PHYSICIAN ASSISTANT

## 2023-05-22 RX ORDER — METOPROLOL SUCCINATE 50 MG/1
TABLET, EXTENDED RELEASE ORAL
Qty: 30 TABLET | Refills: 2 | Status: SHIPPED | OUTPATIENT
Start: 2023-05-22

## 2023-05-26 ENCOUNTER — ANTI-COAG VISIT (OUTPATIENT)
Dept: PRIMARY CARE CLINIC | Age: 48
End: 2023-05-26

## 2023-05-26 LAB
INR BLD: 1
INR BLD: 1

## 2023-05-30 ENCOUNTER — ANTI-COAG VISIT (OUTPATIENT)
Dept: PRIMARY CARE CLINIC | Age: 48
End: 2023-05-30

## 2023-05-30 LAB — INR BLD: 1

## 2023-06-02 ENCOUNTER — ANTI-COAG VISIT (OUTPATIENT)
Dept: PRIMARY CARE CLINIC | Age: 48
End: 2023-06-02

## 2023-06-02 LAB — INR BLD: 1.9

## 2023-06-03 DIAGNOSIS — J30.2 SEASONAL ALLERGIES: ICD-10-CM

## 2023-06-05 RX ORDER — FLUTICASONE PROPIONATE 50 MCG
SPRAY, SUSPENSION (ML) NASAL
Qty: 16 G | Refills: 2 | Status: SHIPPED | OUTPATIENT
Start: 2023-06-05

## 2023-06-06 ENCOUNTER — ANTI-COAG VISIT (OUTPATIENT)
Dept: PRIMARY CARE CLINIC | Age: 48
End: 2023-06-06

## 2023-06-06 DIAGNOSIS — Z79.01 ANTICOAGULATED: Primary | ICD-10-CM

## 2023-06-06 LAB — INR BLD: 1.9

## 2023-06-06 PROCEDURE — 99999 PR OFFICE/OUTPT VISIT,PROCEDURE ONLY: CPT | Performed by: PHYSICIAN ASSISTANT

## 2023-06-08 DIAGNOSIS — G62.9 PERIPHERAL POLYNEUROPATHY: ICD-10-CM

## 2023-06-09 ENCOUNTER — ANTI-COAG VISIT (OUTPATIENT)
Dept: PRIMARY CARE CLINIC | Age: 48
End: 2023-06-09
Payer: MEDICARE

## 2023-06-09 DIAGNOSIS — Z79.01 LONG TERM (CURRENT) USE OF ANTICOAGULANTS: Primary | ICD-10-CM

## 2023-06-09 LAB
INR BLD: 3.5
INR BLD: NORMAL
PROTIME: NORMAL

## 2023-06-09 PROCEDURE — 93793 ANTICOAG MGMT PT WARFARIN: CPT | Performed by: PHYSICIAN ASSISTANT

## 2023-06-09 RX ORDER — GABAPENTIN 300 MG/1
CAPSULE ORAL
Qty: 270 CAPSULE | Refills: 0 | Status: SHIPPED | OUTPATIENT
Start: 2023-06-09 | End: 2023-09-07

## 2023-06-15 ENCOUNTER — ANTI-COAG VISIT (OUTPATIENT)
Dept: PRIMARY CARE CLINIC | Age: 48
End: 2023-06-15
Payer: MEDICARE

## 2023-06-15 DIAGNOSIS — Z79.01 LONG TERM (CURRENT) USE OF ANTICOAGULANTS: Primary | ICD-10-CM

## 2023-06-15 LAB — INR BLD: 3.8

## 2023-06-15 PROCEDURE — 93793 ANTICOAG MGMT PT WARFARIN: CPT | Performed by: PHYSICIAN ASSISTANT

## 2023-06-23 ENCOUNTER — ANTI-COAG VISIT (OUTPATIENT)
Dept: PRIMARY CARE CLINIC | Age: 48
End: 2023-06-23
Payer: MEDICARE

## 2023-06-23 DIAGNOSIS — Z79.01 ANTICOAGULATED: Primary | ICD-10-CM

## 2023-06-23 LAB — INR BLD: 2.5

## 2023-06-23 PROCEDURE — 93793 ANTICOAG MGMT PT WARFARIN: CPT | Performed by: PHYSICIAN ASSISTANT

## 2023-06-23 NOTE — PROGRESS NOTES
-patient has been on 5 mg daily, I do not understand the 4.5 mg comment. I want him to continue 5 mg and recheck weekly. Patient notified to take 5mg qd. He has 2.5mg tabs at home and will take 2.

## 2023-06-30 ENCOUNTER — ANTI-COAG VISIT (OUTPATIENT)
Dept: PRIMARY CARE CLINIC | Age: 48
End: 2023-06-30

## 2023-06-30 LAB — INR BLD: 2.6

## 2023-07-07 ENCOUNTER — ANTI-COAG VISIT (OUTPATIENT)
Dept: PRIMARY CARE CLINIC | Age: 48
End: 2023-07-07
Payer: MEDICARE

## 2023-07-07 DIAGNOSIS — Z79.01 ANTICOAGULATED: Primary | ICD-10-CM

## 2023-07-07 LAB — INR BLD: 1.9

## 2023-07-07 PROCEDURE — 93793 ANTICOAG MGMT PT WARFARIN: CPT | Performed by: PHYSICIAN ASSISTANT

## 2023-07-14 ENCOUNTER — ANTI-COAG VISIT (OUTPATIENT)
Dept: PRIMARY CARE CLINIC | Age: 48
End: 2023-07-14
Payer: MEDICARE

## 2023-07-14 DIAGNOSIS — Z79.01 ANTICOAGULATED: Primary | ICD-10-CM

## 2023-07-14 LAB
INR BLD: 2.7
INTERNATIONAL NORMALIZATION RATIO, POC: 2.7
PROTHROMBIN TIME, POC: 0

## 2023-07-14 PROCEDURE — 85610 PROTHROMBIN TIME: CPT | Performed by: PHYSICIAN ASSISTANT

## 2023-07-14 PROCEDURE — 93793 ANTICOAG MGMT PT WARFARIN: CPT | Performed by: PHYSICIAN ASSISTANT

## 2023-07-21 ENCOUNTER — ANTI-COAG VISIT (OUTPATIENT)
Dept: PRIMARY CARE CLINIC | Age: 48
End: 2023-07-21

## 2023-07-21 DIAGNOSIS — Z79.01 ANTICOAGULATED: Primary | ICD-10-CM

## 2023-07-21 LAB
INR BLD: 1.3
INR BLD: 1.3

## 2023-07-21 PROCEDURE — 99999 PR OFFICE/OUTPT VISIT,PROCEDURE ONLY: CPT | Performed by: PHYSICIAN ASSISTANT

## 2023-07-21 RX ORDER — INSULIN GLARGINE 100 [IU]/ML
25 INJECTION, SOLUTION SUBCUTANEOUS NIGHTLY
Qty: 90 ML | Refills: 1 | Status: SHIPPED | OUTPATIENT
Start: 2023-07-21

## 2023-07-28 ENCOUNTER — ANTI-COAG VISIT (OUTPATIENT)
Dept: PRIMARY CARE CLINIC | Age: 48
End: 2023-07-28

## 2023-07-28 DIAGNOSIS — Z79.01 ANTICOAGULATED: Primary | ICD-10-CM

## 2023-07-28 LAB — INR BLD: 2.2

## 2023-07-28 PROCEDURE — 99999 PR OFFICE/OUTPT VISIT,PROCEDURE ONLY: CPT | Performed by: PHYSICIAN ASSISTANT

## 2023-08-07 ENCOUNTER — ANTI-COAG VISIT (OUTPATIENT)
Dept: PRIMARY CARE CLINIC | Age: 48
End: 2023-08-07
Payer: MEDICARE

## 2023-08-07 DIAGNOSIS — Z79.01 ANTICOAGULATED: Primary | ICD-10-CM

## 2023-08-07 LAB — INR BLD: 2.2

## 2023-08-07 PROCEDURE — 93793 ANTICOAG MGMT PT WARFARIN: CPT | Performed by: PHYSICIAN ASSISTANT

## 2023-08-07 RX ORDER — WARFARIN SODIUM 5 MG/1
5 TABLET ORAL DAILY
Qty: 30 TABLET | Refills: 3 | Status: SHIPPED | OUTPATIENT
Start: 2023-08-07

## 2023-08-11 RX ORDER — WARFARIN SODIUM 2 MG/1
TABLET ORAL
Qty: 30 TABLET | Refills: 3 | OUTPATIENT
Start: 2023-08-11

## 2023-08-15 ENCOUNTER — ANTI-COAG VISIT (OUTPATIENT)
Dept: FAMILY MEDICINE CLINIC | Age: 48
End: 2023-08-15

## 2023-08-16 RX ORDER — METOPROLOL SUCCINATE 50 MG/1
TABLET, EXTENDED RELEASE ORAL
Qty: 90 TABLET | Refills: 1 | Status: SHIPPED | OUTPATIENT
Start: 2023-08-16

## 2023-08-18 ENCOUNTER — ANTI-COAG VISIT (OUTPATIENT)
Dept: PRIMARY CARE CLINIC | Age: 48
End: 2023-08-18
Payer: MEDICARE

## 2023-08-18 DIAGNOSIS — Z79.01 LONG TERM (CURRENT) USE OF ANTICOAGULANTS: Primary | ICD-10-CM

## 2023-08-18 LAB — INR BLD: 1.7

## 2023-08-18 PROCEDURE — 93793 ANTICOAG MGMT PT WARFARIN: CPT | Performed by: PHYSICIAN ASSISTANT

## 2023-08-24 DIAGNOSIS — J30.2 SEASONAL ALLERGIES: ICD-10-CM

## 2023-08-24 RX ORDER — FLUTICASONE PROPIONATE 50 MCG
SPRAY, SUSPENSION (ML) NASAL
Qty: 16 G | Refills: 2 | Status: SHIPPED | OUTPATIENT
Start: 2023-08-24

## 2023-08-25 ENCOUNTER — ANTI-COAG VISIT (OUTPATIENT)
Dept: PRIMARY CARE CLINIC | Age: 48
End: 2023-08-25
Payer: MEDICARE

## 2023-08-25 DIAGNOSIS — Z79.01 ANTICOAGULATED: Primary | ICD-10-CM

## 2023-08-25 LAB — INR BLD: 1.7

## 2023-08-25 PROCEDURE — 93793 ANTICOAG MGMT PT WARFARIN: CPT | Performed by: PHYSICIAN ASSISTANT

## 2023-08-28 RX ORDER — WARFARIN SODIUM 2 MG/1
TABLET ORAL
Qty: 30 TABLET | Refills: 3 | OUTPATIENT
Start: 2023-08-28

## 2023-09-01 ENCOUNTER — ANTI-COAG VISIT (OUTPATIENT)
Dept: PRIMARY CARE CLINIC | Age: 48
End: 2023-09-01
Payer: MEDICARE

## 2023-09-01 DIAGNOSIS — Z79.01 LONG TERM (CURRENT) USE OF ANTICOAGULANTS: Primary | ICD-10-CM

## 2023-09-01 LAB
INR BLD: 4
INR BLD: 8
INR BLD: NORMAL
PROTIME: 8 SECONDS
PROTIME: 8 SECONDS
PROTIME: NORMAL

## 2023-09-01 PROCEDURE — 93793 ANTICOAG MGMT PT WARFARIN: CPT | Performed by: PHYSICIAN ASSISTANT

## 2023-09-01 NOTE — PROGRESS NOTES
Just hold coumadin today, and then restart like normal, 5 mg, and recheck on tues morning and call us     Pt notified

## 2023-09-07 RX ORDER — CHOLESTYRAMINE 4 G/9G
POWDER, FOR SUSPENSION ORAL
Qty: 90 PACKET | Refills: 1 | Status: SHIPPED | OUTPATIENT
Start: 2023-09-07

## 2023-09-08 ENCOUNTER — ANTI-COAG VISIT (OUTPATIENT)
Dept: PRIMARY CARE CLINIC | Age: 48
End: 2023-09-08
Payer: MEDICARE

## 2023-09-08 DIAGNOSIS — Z79.01 ANTICOAGULATED: Primary | ICD-10-CM

## 2023-09-08 LAB — INR BLD: 2.9

## 2023-09-08 PROCEDURE — 93793 ANTICOAG MGMT PT WARFARIN: CPT | Performed by: PHYSICIAN ASSISTANT

## 2023-09-15 ENCOUNTER — ANTI-COAG VISIT (OUTPATIENT)
Dept: PRIMARY CARE CLINIC | Age: 48
End: 2023-09-15
Payer: MEDICARE

## 2023-09-15 ENCOUNTER — TELEPHONE (OUTPATIENT)
Dept: PRIMARY CARE CLINIC | Age: 48
End: 2023-09-15

## 2023-09-15 DIAGNOSIS — Z79.01 ANTICOAGULATED: Primary | ICD-10-CM

## 2023-09-15 LAB — INR BLD: 2.2

## 2023-09-15 PROCEDURE — 93793 ANTICOAG MGMT PT WARFARIN: CPT | Performed by: PHYSICIAN ASSISTANT

## 2023-09-22 ENCOUNTER — ANTI-COAG VISIT (OUTPATIENT)
Dept: PRIMARY CARE CLINIC | Age: 48
End: 2023-09-22
Payer: MEDICARE

## 2023-09-22 DIAGNOSIS — Z79.01 ANTICOAGULATED: ICD-10-CM

## 2023-09-22 LAB
INR BLD: NORMAL
INTERNATIONAL NORMALIZATION RATIO, POC: 3.5
PROTHROMBIN TIME, POC: 3.5
PROTIME: NORMAL

## 2023-09-22 PROCEDURE — 93793 ANTICOAG MGMT PT WARFARIN: CPT | Performed by: PHYSICIAN ASSISTANT

## 2023-09-22 PROCEDURE — 85610 PROTHROMBIN TIME: CPT | Performed by: PHYSICIAN ASSISTANT

## 2023-09-28 LAB
INR BLD: NORMAL
INR BLD: NORMAL
PROTIME: 1.3 SECONDS
PROTIME: 1.3 SECONDS

## 2023-09-29 ENCOUNTER — ANTI-COAG VISIT (OUTPATIENT)
Dept: PRIMARY CARE CLINIC | Age: 48
End: 2023-09-29
Payer: MEDICARE

## 2023-09-29 DIAGNOSIS — Z79.01 ADMISSION FOR LONG-TERM (CURRENT) USE OF ANTICOAGULANTS: Primary | ICD-10-CM

## 2023-09-29 DIAGNOSIS — Z51.81 ADMISSION FOR LONG-TERM (CURRENT) USE OF ANTICOAGULANTS: Primary | ICD-10-CM

## 2023-09-29 DIAGNOSIS — G62.9 PERIPHERAL POLYNEUROPATHY: ICD-10-CM

## 2023-09-29 DIAGNOSIS — Z79.01 ANTICOAGULATED: ICD-10-CM

## 2023-09-29 LAB — INR BLD: 1.3

## 2023-09-29 PROCEDURE — 93793 ANTICOAG MGMT PT WARFARIN: CPT | Performed by: PHYSICIAN ASSISTANT

## 2023-09-29 RX ORDER — GABAPENTIN 300 MG/1
300 CAPSULE ORAL 3 TIMES DAILY
Qty: 90 CAPSULE | Refills: 0 | Status: SHIPPED | OUTPATIENT
Start: 2023-09-29 | End: 2023-12-28

## 2023-09-29 RX ORDER — WARFARIN SODIUM 5 MG/1
5 TABLET ORAL DAILY
Qty: 30 TABLET | Refills: 3 | Status: SHIPPED | OUTPATIENT
Start: 2023-09-29

## 2023-10-06 ENCOUNTER — ANTI-COAG VISIT (OUTPATIENT)
Dept: PRIMARY CARE CLINIC | Age: 48
End: 2023-10-06

## 2023-10-06 LAB — INR BLD: 2

## 2023-10-13 ENCOUNTER — ANTI-COAG VISIT (OUTPATIENT)
Dept: PRIMARY CARE CLINIC | Age: 48
End: 2023-10-13
Payer: MEDICARE

## 2023-10-13 DIAGNOSIS — Z79.01 LONG TERM (CURRENT) USE OF ANTICOAGULANTS: Primary | ICD-10-CM

## 2023-10-13 LAB — INR BLD: 2.9

## 2023-10-13 PROCEDURE — 93793 ANTICOAG MGMT PT WARFARIN: CPT | Performed by: PHYSICIAN ASSISTANT

## 2023-10-18 DIAGNOSIS — I10 ESSENTIAL HYPERTENSION: ICD-10-CM

## 2023-10-18 RX ORDER — LISINOPRIL 5 MG/1
TABLET ORAL
Qty: 90 TABLET | Refills: 1 | Status: SHIPPED | OUTPATIENT
Start: 2023-10-18

## 2023-10-20 ENCOUNTER — ANTI-COAG VISIT (OUTPATIENT)
Dept: PRIMARY CARE CLINIC | Age: 48
End: 2023-10-20

## 2023-10-20 LAB
INR BLD: 4.1
INR BLD: 4.1
INR BLD: NORMAL
PROTIME: NORMAL

## 2023-11-03 ENCOUNTER — ANTI-COAG VISIT (OUTPATIENT)
Dept: PRIMARY CARE CLINIC | Age: 48
End: 2023-11-03

## 2023-11-03 DIAGNOSIS — Z79.01 LONG TERM (CURRENT) USE OF ANTICOAGULANTS: Primary | ICD-10-CM

## 2023-11-03 LAB — INR BLD: 2.9

## 2023-11-03 PROCEDURE — 93793 ANTICOAG MGMT PT WARFARIN: CPT | Performed by: PHYSICIAN ASSISTANT

## 2023-11-07 DIAGNOSIS — G62.9 PERIPHERAL POLYNEUROPATHY: ICD-10-CM

## 2023-11-07 RX ORDER — GABAPENTIN 300 MG/1
300 CAPSULE ORAL 3 TIMES DAILY
Qty: 90 CAPSULE | Refills: 0 | Status: SHIPPED | OUTPATIENT
Start: 2023-11-07 | End: 2024-02-05

## 2023-11-09 NOTE — TELEPHONE ENCOUNTER
med
"I've been drinking practically every day, but my last drink was 8 am tuesday and i've been  feeling jittery and unable to sleep"

## 2023-11-10 ENCOUNTER — ANTI-COAG VISIT (OUTPATIENT)
Dept: PRIMARY CARE CLINIC | Age: 48
End: 2023-11-10

## 2023-11-10 DIAGNOSIS — Z79.01 LONG TERM (CURRENT) USE OF ANTICOAGULANTS: Primary | ICD-10-CM

## 2023-11-10 LAB
INR BLD: 3.1
INR BLD: NORMAL
PROTIME: NORMAL

## 2023-11-17 ENCOUNTER — ANTI-COAG VISIT (OUTPATIENT)
Dept: PRIMARY CARE CLINIC | Age: 48
End: 2023-11-17

## 2023-11-17 LAB
INR BLD: 3.6
INR BLD: NORMAL
PROTIME: NORMAL

## 2023-11-24 ENCOUNTER — ANTI-COAG VISIT (OUTPATIENT)
Dept: PRIMARY CARE CLINIC | Age: 48
End: 2023-11-24
Payer: MEDICARE

## 2023-11-24 DIAGNOSIS — Z79.01 ANTICOAGULATED: Primary | ICD-10-CM

## 2023-11-24 DIAGNOSIS — Z79.01 ANTICOAGULATED: ICD-10-CM

## 2023-11-24 DIAGNOSIS — J30.2 SEASONAL ALLERGIES: ICD-10-CM

## 2023-11-24 LAB — INR BLD: 2.9

## 2023-11-24 PROCEDURE — 36415 COLL VENOUS BLD VENIPUNCTURE: CPT | Performed by: PHYSICIAN ASSISTANT

## 2023-11-24 RX ORDER — WARFARIN SODIUM 5 MG/1
5 TABLET ORAL DAILY
Qty: 30 TABLET | Refills: 3 | Status: SHIPPED | OUTPATIENT
Start: 2023-11-24

## 2023-11-24 RX ORDER — FLUTICASONE PROPIONATE 50 MCG
2 SPRAY, SUSPENSION (ML) NASAL DAILY
Qty: 16 G | Refills: 2 | Status: SHIPPED | OUTPATIENT
Start: 2023-11-24

## 2023-11-30 LAB
INR BLD: NORMAL
PROTIME: NORMAL

## 2023-12-01 ENCOUNTER — ANTI-COAG VISIT (OUTPATIENT)
Dept: PRIMARY CARE CLINIC | Age: 48
End: 2023-12-01
Payer: MEDICARE

## 2023-12-01 DIAGNOSIS — M54.16 LUMBAR RADICULOPATHY: ICD-10-CM

## 2023-12-01 DIAGNOSIS — Z79.01 LONG TERM (CURRENT) USE OF ANTICOAGULANTS: Primary | ICD-10-CM

## 2023-12-01 DIAGNOSIS — M79.2 NEUROPATHIC PAIN: ICD-10-CM

## 2023-12-01 DIAGNOSIS — G62.9 PERIPHERAL POLYNEUROPATHY: ICD-10-CM

## 2023-12-01 LAB — INR BLD: 2.2

## 2023-12-01 PROCEDURE — 93793 ANTICOAG MGMT PT WARFARIN: CPT | Performed by: PHYSICIAN ASSISTANT

## 2023-12-01 RX ORDER — CYCLOBENZAPRINE HCL 10 MG
10 TABLET ORAL 3 TIMES DAILY PRN
Qty: 270 TABLET | Refills: 0 | Status: SHIPPED | OUTPATIENT
Start: 2023-12-01

## 2023-12-01 RX ORDER — GABAPENTIN 300 MG/1
300 CAPSULE ORAL 3 TIMES DAILY
Qty: 90 CAPSULE | Refills: 0 | Status: SHIPPED | OUTPATIENT
Start: 2023-12-01 | End: 2024-02-29

## 2023-12-06 ENCOUNTER — TELEPHONE (OUTPATIENT)
Dept: PRIMARY CARE CLINIC | Age: 48
End: 2023-12-06

## 2023-12-06 NOTE — TELEPHONE ENCOUNTER
Called patient to schedule an appointment. Patient did not answer and was unable to leave a message to call back. Voicemail full.

## 2023-12-06 NOTE — TELEPHONE ENCOUNTER
----- Message from [id number] sent at 12/6/2023  8:17 AM EST -----  Subject: Appointment Request    Reason for Call: Established Patient Appointment needed: Routine Existing   Condition Follow Up    QUESTIONS    Reason for appointment request? Available appointments did not meet   patient need     Additional Information for Provider? Pt has a ride today and would like to   see Dr. Monika Vance.  Please call if there is a cancellation.   ---------------------------------------------------------------------------  --------------  Suri De Jesus INFO  [phone]; OK to leave message on voicemail  ---------------------------------------------------------------------------  --------------  SCRIPT ANSWERS

## 2023-12-08 ENCOUNTER — ANTI-COAG VISIT (OUTPATIENT)
Dept: PRIMARY CARE CLINIC | Age: 48
End: 2023-12-08

## 2023-12-08 DIAGNOSIS — Z79.01 LONG TERM (CURRENT) USE OF ANTICOAGULANTS: Primary | ICD-10-CM

## 2023-12-08 LAB
INR BLD: 4
INR BLD: NORMAL
PROTIME: NORMAL

## 2023-12-15 ENCOUNTER — ANTI-COAG VISIT (OUTPATIENT)
Dept: PRIMARY CARE CLINIC | Age: 48
End: 2023-12-15

## 2023-12-15 DIAGNOSIS — Z79.01 ANTICOAGULATED: Primary | ICD-10-CM

## 2023-12-15 LAB — INR BLD: 2.4

## 2023-12-15 PROCEDURE — 99999 PR OFFICE/OUTPT VISIT,PROCEDURE ONLY: CPT | Performed by: PHYSICIAN ASSISTANT

## 2023-12-22 DIAGNOSIS — Z79.899 MEDICATION MANAGEMENT: ICD-10-CM

## 2023-12-22 DIAGNOSIS — E78.5 HYPERLIPIDEMIA LDL GOAL <70: ICD-10-CM

## 2023-12-22 DIAGNOSIS — E11.65 TYPE 2 DIABETES MELLITUS WITH HYPERGLYCEMIA, WITHOUT LONG-TERM CURRENT USE OF INSULIN (HCC): ICD-10-CM

## 2023-12-26 LAB
6-MONOACETYLMORPHINE, URINE: NEGATIVE
ABNORMAL SPECIMEN VALIDITY TEST: ABNORMAL
ALCOHOL URINE: NOT DETECTED MG/DL
AMPHETAMINE SCREEN URINE: NEGATIVE
BARBITURATE SCREEN URINE: NEGATIVE
BENZODIAZEPINE SCREEN, URINE: NEGATIVE
BUPRENORPHINE URINE: NEGATIVE
CANNABINOID SCREEN URINE: NEGATIVE
COCAINE METABOLITE, URINE: NEGATIVE
FENTANYL URINE: POSITIVE
INTEGRITY CHECK, CREATININE, URINE: 212.9 MG/DL (ref 22–250)
INTEGRITY CHECK, OXIDANT, URINE: <40 MG/L
INTEGRITY CHECK, PH, URINE: 5.6 (ref 4.5–9)
INTEGRITY CHECK, SPECIFIC GRAVITY, URINE: 1.02 (ref 1–1.03)
METHADONE SCREEN, URINE: NEGATIVE
OPIATES, URINE: NEGATIVE
OXYCODONE SCREEN URINE: NEGATIVE
PHENCYCLIDINE, URINE: NEGATIVE
TEST INFORMATION: ABNORMAL
TRAMADOL, URINE: NEGATIVE

## 2023-12-27 LAB
COMPLIANCE DRUG ANALYSIS, URINE: NORMAL
FENTANYL, URN, QUANT: 579 NG/ML
NORFENTANYL, URN, QUANT: >1000 NG/ML

## 2024-01-02 LAB
INR BLD: NORMAL
PROTIME: NORMAL

## 2024-01-05 ENCOUNTER — ANTI-COAG VISIT (OUTPATIENT)
Dept: PRIMARY CARE CLINIC | Age: 49
End: 2024-01-05
Payer: MEDICARE

## 2024-01-05 DIAGNOSIS — Z79.01 ANTICOAGULATED: Primary | ICD-10-CM

## 2024-01-05 LAB
INR BLD: 3.6
INR, EXTERNAL: 3.6

## 2024-01-05 PROCEDURE — 93793 ANTICOAG MGMT PT WARFARIN: CPT | Performed by: PHYSICIAN ASSISTANT

## 2024-01-12 ENCOUNTER — ANTI-COAG VISIT (OUTPATIENT)
Dept: PRIMARY CARE CLINIC | Age: 49
End: 2024-01-12

## 2024-01-12 DIAGNOSIS — Z79.01 LONG TERM (CURRENT) USE OF ANTICOAGULANTS: Primary | ICD-10-CM

## 2024-01-12 LAB
INR BLD: 1.2
INR BLD: NORMAL
PROTIME: NORMAL

## 2024-01-22 NOTE — PROGRESS NOTES
[place]ard[place]40 y/[place] has shortness of breath and trouble breathing  ----- Message from Sharmin Jackson sent at 2024  4:36 PM CST -----  Patient Name: Ruthy Melton    Specialist or PCP Name: Frida Fine    Symptoms: 40 y/o [place] has shortness of breath and trouble breathing    Pregnant (females aged 13-60. If Yes, how long?) : na    Call Back # : [phone]    Which State are you currently located in?: [place]    Name of Clinic Site / Acct# : [place]    Use following scripting for patients waiting for a callback:   \"Nurse callback times vary based on call volumes; please be aware the return phone call may come from an unidentified or out of state phone number. If your symptoms worsen or become life threatening while waiting, you should seek immediate assistance by calling 911 or going to the ER for evaluation.\"     Inconsistently  x     ASSESSMENT:    Comments:  Met with pt in room, agreeable to PT session. Pt able to complete all transfers without hands on assistance, moving well this date. Occasional sharp pain in abdomen which makes pt pause and flex over but subsides quickly. Ambulated into bathroom at pt request to void -- provided time. Ambulated back to bedside -- pt fairly steady but does reach for surfaces for support in room. Rest break taken EOB. Ambulation completed again around room with same dotty and balance as before. Cued for breathing and posture. Returned to bed to end session. Treatment:  Patient practiced and was instructed in the following treatment:    · Bed mobility training - pt given verbal and tactile cues to facilitate proper sequencing and safety during rolling and supine>sit  · STS and pivot transfer training - pt educated on proper hand and foot placement, safety and sequencing, and use of proper technique to safely complete sit<>stand  · Gait training- pt was given verbal and tactile cues to facilitate faster dotty with improved balance during ambulation as well as provided with physical assistance to complete task. PLAN:    Patient is making good progress towards established goals. Will continue with current POC.       Time in  1305  Time out  1330    Total Treatment Time  23 minutes     CPT codes:  [] Gait training 91213 0 minutes  [] Manual therapy 87002 0 minutes  [x] Therapeutic activities 38317 23 minutes  [] Therapeutic exercises 07292 0 minutes  [] Neuromuscular reeducation 00299 0 minutes    Jp Chiang, PT, DPT  [id number]

## 2024-01-26 ENCOUNTER — ANTI-COAG VISIT (OUTPATIENT)
Dept: PRIMARY CARE CLINIC | Age: 49
End: 2024-01-26

## 2024-01-26 DIAGNOSIS — Z79.01 LONG TERM (CURRENT) USE OF ANTICOAGULANTS: Primary | ICD-10-CM

## 2024-01-26 LAB
INR BLD: 3.2
INR BLD: NORMAL
PROTIME: NORMAL

## 2024-02-13 ENCOUNTER — OFFICE VISIT (OUTPATIENT)
Dept: FAMILY MEDICINE CLINIC | Age: 49
End: 2024-02-13
Payer: MEDICARE

## 2024-02-13 VITALS
HEART RATE: 92 BPM | TEMPERATURE: 96.9 F | SYSTOLIC BLOOD PRESSURE: 124 MMHG | DIASTOLIC BLOOD PRESSURE: 70 MMHG | WEIGHT: 315 LBS | OXYGEN SATURATION: 94 % | RESPIRATION RATE: 16 BRPM | BODY MASS INDEX: 41.75 KG/M2 | HEIGHT: 73 IN

## 2024-02-13 DIAGNOSIS — I82.402 RECURRENT ACUTE DEEP VEIN THROMBOSIS (DVT) OF LEFT LOWER EXTREMITY (HCC): ICD-10-CM

## 2024-02-13 DIAGNOSIS — I10 PRIMARY HYPERTENSION: ICD-10-CM

## 2024-02-13 DIAGNOSIS — Z76.89 ENCOUNTER TO ESTABLISH CARE: Primary | ICD-10-CM

## 2024-02-13 DIAGNOSIS — E11.9 TYPE 2 DIABETES MELLITUS WITHOUT COMPLICATION, WITH LONG-TERM CURRENT USE OF INSULIN (HCC): ICD-10-CM

## 2024-02-13 DIAGNOSIS — G62.9 PERIPHERAL POLYNEUROPATHY: ICD-10-CM

## 2024-02-13 DIAGNOSIS — Z79.4 TYPE 2 DIABETES MELLITUS WITHOUT COMPLICATION, WITH LONG-TERM CURRENT USE OF INSULIN (HCC): ICD-10-CM

## 2024-02-13 PROCEDURE — 3078F DIAST BP <80 MM HG: CPT | Performed by: NURSE PRACTITIONER

## 2024-02-13 PROCEDURE — 2022F DILAT RTA XM EVC RTNOPTHY: CPT | Performed by: NURSE PRACTITIONER

## 2024-02-13 PROCEDURE — 3074F SYST BP LT 130 MM HG: CPT | Performed by: NURSE PRACTITIONER

## 2024-02-13 PROCEDURE — 99214 OFFICE O/P EST MOD 30 MIN: CPT | Performed by: NURSE PRACTITIONER

## 2024-02-13 PROCEDURE — G8484 FLU IMMUNIZE NO ADMIN: HCPCS | Performed by: NURSE PRACTITIONER

## 2024-02-13 PROCEDURE — G8417 CALC BMI ABV UP PARAM F/U: HCPCS | Performed by: NURSE PRACTITIONER

## 2024-02-13 PROCEDURE — 3046F HEMOGLOBIN A1C LEVEL >9.0%: CPT | Performed by: NURSE PRACTITIONER

## 2024-02-13 PROCEDURE — 4004F PT TOBACCO SCREEN RCVD TLK: CPT | Performed by: NURSE PRACTITIONER

## 2024-02-13 PROCEDURE — G8427 DOCREV CUR MEDS BY ELIG CLIN: HCPCS | Performed by: NURSE PRACTITIONER

## 2024-02-13 PROCEDURE — 3017F COLORECTAL CA SCREEN DOC REV: CPT | Performed by: NURSE PRACTITIONER

## 2024-02-13 RX ORDER — GABAPENTIN 300 MG/1
300 CAPSULE ORAL 3 TIMES DAILY
Qty: 90 CAPSULE | Refills: 0 | Status: CANCELLED | OUTPATIENT
Start: 2024-02-13 | End: 2024-05-13

## 2024-02-13 NOTE — PROGRESS NOTES
Fausto Del Angel II (:  1975) is a 48 y.o. male,New patient, here for evaluation of the following chief complaint(s):  Establish Care         ASSESSMENT/PLAN:  1. Encounter to establish care  2. Peripheral polyneuropathy  Advised will not refill gabapentin due to failed UDS (fentanyl)    3. Type 2 diabetes mellitus without complication, with long-term current use of insulin (HCC)  -     Continuous Blood Gluc Sensor (FREESTYLE JOANIE 2 SENSOR) MISC; 1 Device by Does not apply route Daily, Disp-1 each, R-2Normal  Stable, The current medical regimen is effective;  continue present plan and medications.  Recheck in 3 months    4. Primary hypertension  The current medical regimen is effective;  continue present plan and medications.    5. Recurrent acute deep vein thrombosis (DVT) of left lower extremity (HCC)  On coumadin, checks INR at home weekly  INR one week  Recheck in 3 months    Controlled Substance Monitoring:    [place] reviewed        No follow-ups on file.         Subjective   SUBJECTIVE/OBJECTIVE:  Patient is here to establish care  [place] INR at home.  History of DVT and thrombus inferior vena cava  Patient has CGM for diabetes. Last A1C was 6.1.   random glucose in office is 151.  Taking insulins as ordered. States diagnosed in 2020.  Does not see endo  Chronic seeping to lower legs.  Left leg worse than right-DVTs were on left. Has diuretics at home.  Keeps legs elevated and has compression wraps.    Requesting gabapentin refill.  Advised would not fill due to failed UDS. States he does not know where the fentanyl came from.  Denies illicit drug use        Review of Systems   Constitutional:  Negative for activity change, appetite change, fatigue and unexpected weight change.   Respiratory:  Positive for shortness of breath (chronic). Negative for cough and wheezing.    Cardiovascular:  Negative for chest pain and palpitations.   Gastrointestinal:  Positive for diarrhea (chronic). Negative for

## 2024-02-20 ENCOUNTER — ANTI-COAG VISIT (OUTPATIENT)
Dept: FAMILY MEDICINE CLINIC | Age: 49
End: 2024-02-20
Payer: MEDICARE

## 2024-02-20 DIAGNOSIS — I82.402 RECURRENT ACUTE DEEP VEIN THROMBOSIS (DVT) OF LEFT LOWER EXTREMITY (HCC): Primary | ICD-10-CM

## 2024-02-20 LAB — INR BLD: 3.6

## 2024-02-20 PROCEDURE — 93793 ANTICOAG MGMT PT WARFARIN: CPT | Performed by: FAMILY MEDICINE

## 2024-02-20 NOTE — PROGRESS NOTES
Spoke with patient, he is aware, states he already held his coumadin yesterday so will start 5 mg today and 2.5 mg on Saturday, also states he checks weekly on Mondays.

## 2024-02-21 DIAGNOSIS — J30.2 SEASONAL ALLERGIES: ICD-10-CM

## 2024-02-21 RX ORDER — FLUTICASONE PROPIONATE 50 MCG
2 SPRAY, SUSPENSION (ML) NASAL DAILY
Qty: 16 G | Refills: 2 | OUTPATIENT
Start: 2024-02-21

## 2024-02-22 RX ORDER — METOPROLOL SUCCINATE 50 MG/1
TABLET, EXTENDED RELEASE ORAL
Qty: 90 TABLET | Refills: 1 | OUTPATIENT
Start: 2024-02-22

## 2024-03-01 DIAGNOSIS — R60.0 BILATERAL LEG EDEMA: ICD-10-CM

## 2024-03-01 RX ORDER — FUROSEMIDE 20 MG/1
20 TABLET ORAL DAILY
Qty: 20 TABLET | Refills: 3 | OUTPATIENT
Start: 2024-03-01

## 2024-03-01 RX ORDER — POTASSIUM CHLORIDE 750 MG/1
10 TABLET, EXTENDED RELEASE ORAL DAILY
Qty: 20 TABLET | Refills: 3 | OUTPATIENT
Start: 2024-03-01

## 2024-03-11 RX ORDER — METOPROLOL SUCCINATE 50 MG/1
TABLET, EXTENDED RELEASE ORAL
Qty: 90 TABLET | Refills: 1 | OUTPATIENT
Start: 2024-03-11

## 2024-03-12 ENCOUNTER — TELEPHONE (OUTPATIENT)
Dept: FAMILY MEDICINE CLINIC | Age: 49
End: 2024-03-12

## 2024-03-12 NOTE — TELEPHONE ENCOUNTER
Spoke with patient and he states he got his INR done yesterday and it was 3.6...    Called [facility] to get results and they will be faxing a form for us to fill out to receive results.

## 2024-03-25 ENCOUNTER — ANTI-COAG VISIT (OUTPATIENT)
Dept: FAMILY MEDICINE CLINIC | Age: 49
End: 2024-03-25

## 2024-03-25 LAB — INR BLD: 2.4

## 2024-03-26 ENCOUNTER — TELEPHONE (OUTPATIENT)
Dept: FAMILY MEDICINE CLINIC | Age: 49
End: 2024-03-26

## 2024-03-27 DIAGNOSIS — J30.2 SEASONAL ALLERGIES: ICD-10-CM

## 2024-03-28 RX ORDER — METOPROLOL SUCCINATE 50 MG/1
TABLET, EXTENDED RELEASE ORAL
Qty: 90 TABLET | Refills: 1 | OUTPATIENT
Start: 2024-03-28

## 2024-03-28 RX ORDER — FLUTICASONE PROPIONATE 50 MCG
2 SPRAY, SUSPENSION (ML) NASAL DAILY
Qty: 16 G | Refills: 2 | OUTPATIENT
Start: 2024-03-28

## 2024-04-02 ENCOUNTER — ANTI-COAG VISIT (OUTPATIENT)
Dept: FAMILY MEDICINE CLINIC | Age: 49
End: 2024-04-02
Payer: MEDICARE

## 2024-04-02 DIAGNOSIS — R79.1 SUPRATHERAPEUTIC INR: Primary | ICD-10-CM

## 2024-04-02 LAB — INR BLD: 2.5

## 2024-04-02 PROCEDURE — 93793 ANTICOAG MGMT PT WARFARIN: CPT | Performed by: FAMILY MEDICINE

## 2024-04-03 ENCOUNTER — TELEPHONE (OUTPATIENT)
Dept: FAMILY MEDICINE CLINIC | Age: 49
End: 2024-04-03

## 2024-04-08 ENCOUNTER — ANTI-COAG VISIT (OUTPATIENT)
Dept: FAMILY MEDICINE CLINIC | Age: 49
End: 2024-04-08
Payer: MEDICARE

## 2024-04-08 DIAGNOSIS — Z79.01 ANTICOAGULATED: Primary | ICD-10-CM

## 2024-04-08 LAB — INR BLD: 2

## 2024-04-08 PROCEDURE — 93793 ANTICOAG MGMT PT WARFARIN: CPT | Performed by: FAMILY MEDICINE

## 2024-04-15 ENCOUNTER — ANTI-COAG VISIT (OUTPATIENT)
Dept: FAMILY MEDICINE CLINIC | Age: 49
End: 2024-04-15
Payer: MEDICARE

## 2024-04-15 DIAGNOSIS — Z86.718 HISTORY OF DEEP VEIN THROMBOSIS: Primary | ICD-10-CM

## 2024-04-15 LAB — INR BLD: 3.3

## 2024-04-15 PROCEDURE — 93793 ANTICOAG MGMT PT WARFARIN: CPT | Performed by: FAMILY MEDICINE

## 2024-04-21 DIAGNOSIS — I10 ESSENTIAL HYPERTENSION: ICD-10-CM

## 2024-04-22 ENCOUNTER — ANTI-COAG VISIT (OUTPATIENT)
Dept: FAMILY MEDICINE CLINIC | Age: 49
End: 2024-04-22
Payer: MEDICARE

## 2024-04-22 DIAGNOSIS — R79.1 SUPRATHERAPEUTIC INR: Primary | ICD-10-CM

## 2024-04-22 LAB — INR BLD: 2.9

## 2024-04-22 PROCEDURE — 93793 ANTICOAG MGMT PT WARFARIN: CPT | Performed by: FAMILY MEDICINE

## 2024-04-22 RX ORDER — LISINOPRIL 5 MG/1
TABLET ORAL
Qty: 90 TABLET | Refills: 1 | OUTPATIENT
Start: 2024-04-22

## 2024-04-23 DIAGNOSIS — Z79.4 TYPE 2 DIABETES MELLITUS WITHOUT COMPLICATION, WITH LONG-TERM CURRENT USE OF INSULIN (HCC): ICD-10-CM

## 2024-04-23 DIAGNOSIS — E11.9 TYPE 2 DIABETES MELLITUS WITHOUT COMPLICATION, WITH LONG-TERM CURRENT USE OF INSULIN (HCC): ICD-10-CM

## 2024-05-01 DIAGNOSIS — I10 ESSENTIAL HYPERTENSION: ICD-10-CM

## 2024-05-02 RX ORDER — LISINOPRIL 5 MG/1
TABLET ORAL
Qty: 90 TABLET | Refills: 1 | OUTPATIENT
Start: 2024-05-02

## 2024-05-29 ENCOUNTER — ANTI-COAG VISIT (OUTPATIENT)
Dept: FAMILY MEDICINE CLINIC | Age: 49
End: 2024-05-29

## 2024-10-24 ENCOUNTER — APPOINTMENT (OUTPATIENT)
Dept: GENERAL RADIOLOGY | Age: 49
End: 2024-10-24
Payer: MEDICARE

## 2024-10-24 ENCOUNTER — ANESTHESIA EVENT (OUTPATIENT)
Dept: INTERVENTIONAL RADIOLOGY/VASCULAR | Age: 49
End: 2024-10-24

## 2024-10-24 ENCOUNTER — HOSPITAL ENCOUNTER (INPATIENT)
Age: 49
LOS: 21 days | Discharge: NURSING, REHAB OR LONG-TERM CARE | End: 2024-11-14
Attending: EMERGENCY MEDICINE | Admitting: STUDENT IN AN ORGANIZED HEALTH CARE EDUCATION/TRAINING PROGRAM
Payer: MEDICARE

## 2024-10-24 ENCOUNTER — APPOINTMENT (OUTPATIENT)
Dept: CT IMAGING | Age: 49
End: 2024-10-24
Payer: MEDICARE

## 2024-10-24 ENCOUNTER — APPOINTMENT (OUTPATIENT)
Dept: INTERVENTIONAL RADIOLOGY/VASCULAR | Age: 49
End: 2024-10-24
Payer: MEDICARE

## 2024-10-24 ENCOUNTER — ANESTHESIA (OUTPATIENT)
Dept: INTERVENTIONAL RADIOLOGY/VASCULAR | Age: 49
End: 2024-10-24

## 2024-10-24 DIAGNOSIS — T14.90XA TRAUMA: ICD-10-CM

## 2024-10-24 DIAGNOSIS — Z96.649 PERIPROSTHETIC FRACTURE OF HIP, INITIAL ENCOUNTER: ICD-10-CM

## 2024-10-24 DIAGNOSIS — E83.51 HYPOCALCEMIA: ICD-10-CM

## 2024-10-24 DIAGNOSIS — E87.5 HYPERKALEMIA: ICD-10-CM

## 2024-10-24 DIAGNOSIS — J94.2 HEMOTHORAX ON RIGHT: ICD-10-CM

## 2024-10-24 DIAGNOSIS — T84.029A DISLOCATION OF HIP JOINT PROSTHESIS, INITIAL ENCOUNTER (HCC): ICD-10-CM

## 2024-10-24 DIAGNOSIS — J96.02 ACUTE RESPIRATORY FAILURE WITH HYPOXIA AND HYPERCAPNIA: ICD-10-CM

## 2024-10-24 DIAGNOSIS — S01.01XA LACERATION OF SCALP, INITIAL ENCOUNTER: ICD-10-CM

## 2024-10-24 DIAGNOSIS — N17.9 ACUTE KIDNEY INJURY (HCC): ICD-10-CM

## 2024-10-24 DIAGNOSIS — I21.3 ST ELEVATION MYOCARDIAL INFARCTION (STEMI), UNSPECIFIED ARTERY (HCC): ICD-10-CM

## 2024-10-24 DIAGNOSIS — D62 ACUTE BLOOD LOSS ANEMIA: ICD-10-CM

## 2024-10-24 DIAGNOSIS — E87.20 LACTIC ACID ACIDOSIS: ICD-10-CM

## 2024-10-24 DIAGNOSIS — M97.8XXA PERIPROSTHETIC FRACTURE OF HIP, INITIAL ENCOUNTER: ICD-10-CM

## 2024-10-24 DIAGNOSIS — Z96.649 DISLOCATION OF HIP JOINT PROSTHESIS, INITIAL ENCOUNTER (HCC): ICD-10-CM

## 2024-10-24 DIAGNOSIS — S32.811A MULTIPLE CLOSED FRACTURES OF PELVIS WITH UNSTABLE DISRUPTION OF PELVIC RING, INITIAL ENCOUNTER (HCC): Primary | ICD-10-CM

## 2024-10-24 DIAGNOSIS — J94.2 HEMOTHORAX ON LEFT: ICD-10-CM

## 2024-10-24 DIAGNOSIS — J96.01 ACUTE RESPIRATORY FAILURE WITH HYPOXIA: ICD-10-CM

## 2024-10-24 DIAGNOSIS — J96.01 ACUTE RESPIRATORY FAILURE WITH HYPOXIA AND HYPERCAPNIA: ICD-10-CM

## 2024-10-24 DIAGNOSIS — I46.9 CARDIAC ARREST: ICD-10-CM

## 2024-10-24 LAB
AADO2: 349.4 MMHG
AADO2: 551.3 MMHG
ALBUMIN SERPL-MCNC: 2.6 G/DL (ref 3.5–5.2)
ALBUMIN SERPL-MCNC: 3.4 G/DL (ref 3.5–5.2)
ALBUMIN SERPL-MCNC: 4 G/DL (ref 3.5–5.2)
ALP SERPL-CCNC: 124 U/L (ref 40–129)
ALP SERPL-CCNC: 152 U/L (ref 40–129)
ALP SERPL-CCNC: 184 U/L (ref 40–129)
ALT SERPL-CCNC: 53 U/L (ref 0–40)
ALT SERPL-CCNC: 55 U/L (ref 0–40)
ALT SERPL-CCNC: 65 U/L (ref 0–40)
AMPHET UR QL SCN: NEGATIVE
ANION GAP SERPL CALCULATED.3IONS-SCNC: 16 MMOL/L (ref 7–16)
ANION GAP SERPL CALCULATED.3IONS-SCNC: 19 MMOL/L (ref 7–16)
ANION GAP SERPL CALCULATED.3IONS-SCNC: 20 MMOL/L (ref 7–16)
APAP SERPL-MCNC: <5 UG/ML (ref 10–30)
APAP SERPL-MCNC: <5 UG/ML (ref 10–30)
AST SERPL-CCNC: 125 U/L (ref 0–39)
AST SERPL-CCNC: 139 U/L (ref 0–39)
AST SERPL-CCNC: 84 U/L (ref 0–39)
B.E.: -13.7 MMOL/L (ref -3–3)
B.E.: -18.5 MMOL/L (ref -3–3)
B.E.: -6.6 MMOL/L (ref -3–3)
B.E.: -7.1 MMOL/L (ref -3–3)
BARBITURATES UR QL SCN: NEGATIVE
BASOPHILS # BLD: 0 K/UL (ref 0–0.2)
BASOPHILS # BLD: 0.06 K/UL (ref 0–0.2)
BASOPHILS # BLD: 0.12 K/UL (ref 0–0.2)
BASOPHILS NFR BLD: 0 % (ref 0–2)
BASOPHILS NFR BLD: 0 % (ref 0–2)
BASOPHILS NFR BLD: 1 % (ref 0–2)
BENZODIAZ UR QL: NEGATIVE
BILIRUB SERPL-MCNC: 0.3 MG/DL (ref 0–1.2)
BILIRUB SERPL-MCNC: 0.5 MG/DL (ref 0–1.2)
BILIRUB SERPL-MCNC: 0.7 MG/DL (ref 0–1.2)
BUN BLD-MCNC: 20 MG/DL (ref 6–20)
BUN BLD-MCNC: 21 MG/DL (ref 6–20)
BUN SERPL-MCNC: 22 MG/DL (ref 6–20)
BUN SERPL-MCNC: 23 MG/DL (ref 6–20)
BUN SERPL-MCNC: 24 MG/DL (ref 6–20)
BUPRENORPHINE UR QL: NEGATIVE
CA-I BLD-SCNC: 0.92 MMOL/L (ref 1.15–1.33)
CA-I BLD-SCNC: 0.97 MMOL/L (ref 1.15–1.33)
CA-I BLD-SCNC: 1.02 MMOL/L (ref 1.15–1.33)
CALCIUM SERPL-MCNC: 7.1 MG/DL (ref 8.6–10.2)
CALCIUM SERPL-MCNC: 7.6 MG/DL (ref 8.6–10.2)
CALCIUM SERPL-MCNC: 8.5 MG/DL (ref 8.6–10.2)
CANNABINOIDS UR QL SCN: NEGATIVE
CHLORIDE BLD-SCNC: 111 MMOL/L (ref 100–108)
CHLORIDE BLD-SCNC: 112 MMOL/L (ref 100–108)
CHLORIDE SERPL-SCNC: 103 MMOL/L (ref 98–107)
CHLORIDE SERPL-SCNC: 105 MMOL/L (ref 98–107)
CHLORIDE SERPL-SCNC: 109 MMOL/L (ref 98–107)
CLOT ANGLE.KAOLIN INDUCED BLD RES TEG: 55.8 DEG (ref 53–70)
CLOT ANGLE.KAOLIN INDUCED BLD RES TEG: 78.2 DEG (ref 53–70)
CO2 BLD CALC-SCNC: 11 MMOL/L (ref 22–29)
CO2 BLD CALC-SCNC: 14 MMOL/L (ref 22–29)
CO2 SERPL-SCNC: 11 MMOL/L (ref 22–29)
CO2 SERPL-SCNC: 12 MMOL/L (ref 22–29)
CO2 SERPL-SCNC: 15 MMOL/L (ref 22–29)
COCAINE UR QL SCN: POSITIVE
COHB: 0.3 % (ref 0–1.5)
COHB: 0.3 % (ref 0–1.5)
COHB: 0.5 % (ref 0–1.5)
COHB: 0.5 % (ref 0–1.5)
COHB: 0.7 % (ref 0–1.5)
CREAT BLD-MCNC: 1.5 MG/DL (ref 0.7–1.2)
CREAT BLD-MCNC: 1.5 MG/DL (ref 0.7–1.2)
CREAT SERPL-MCNC: 1.6 MG/DL (ref 0.7–1.2)
CREAT SERPL-MCNC: 1.8 MG/DL (ref 0.7–1.2)
CREAT SERPL-MCNC: 2 MG/DL (ref 0.7–1.2)
CRITICAL: ABNORMAL
DATE ANALYZED: ABNORMAL
DATE OF COLLECTION: ABNORMAL
EGFR, POC: 56 ML/MIN/1.73M2
EGFR, POC: 56 ML/MIN/1.73M2
EOSINOPHIL # BLD: 0 K/UL (ref 0.05–0.5)
EOSINOPHIL # BLD: 0.05 K/UL (ref 0.05–0.5)
EOSINOPHIL # BLD: 0.39 K/UL (ref 0.05–0.5)
EOSINOPHILS RELATIVE PERCENT: 0 % (ref 0–6)
EOSINOPHILS RELATIVE PERCENT: 0 % (ref 0–6)
EOSINOPHILS RELATIVE PERCENT: 2 % (ref 0–6)
EPL-TEG: 0 % (ref 0–15)
EPL-TEG: 1.3 % (ref 0–15)
ERYTHROCYTE [DISTWIDTH] IN BLOOD BY AUTOMATED COUNT: 14.3 % (ref 11.5–15)
ERYTHROCYTE [DISTWIDTH] IN BLOOD BY AUTOMATED COUNT: 14.3 % (ref 11.5–15)
ERYTHROCYTE [DISTWIDTH] IN BLOOD BY AUTOMATED COUNT: 15.1 % (ref 11.5–15)
ETHANOLAMINE SERPL-MCNC: <10 MG/DL (ref 0–0.08)
ETHANOLAMINE SERPL-MCNC: <10 MG/DL (ref 0–0.08)
FENTANYL UR QL: POSITIVE
FIO2: 100 %
FIO2: 100 %
G-TEG: 14.2 KDYN/CM2 (ref 4.5–11)
G-TEG: 5.5 KDYN/CM2 (ref 4.5–11)
GFR, ESTIMATED: 40 ML/MIN/1.73M2
GFR, ESTIMATED: 45 ML/MIN/1.73M2
GFR, ESTIMATED: 53 ML/MIN/1.73M2
GLUCOSE BLD-MCNC: 196 MG/DL (ref 74–99)
GLUCOSE BLD-MCNC: 203 MG/DL (ref 74–99)
GLUCOSE SERPL-MCNC: 190 MG/DL (ref 74–99)
GLUCOSE SERPL-MCNC: 271 MG/DL (ref 74–99)
GLUCOSE SERPL-MCNC: 325 MG/DL (ref 74–99)
HCO3: 13.9 MMOL/L (ref 22–26)
HCO3: 14.6 MMOL/L (ref 22–26)
HCO3: 17.2 MMOL/L (ref 22–26)
HCO3: 9.1 MMOL/L (ref 22–26)
HCT VFR BLD AUTO: 22 % (ref 37–54)
HCT VFR BLD AUTO: 23 % (ref 37–54)
HCT VFR BLD AUTO: 24.9 % (ref 37–54)
HCT VFR BLD AUTO: 27.2 % (ref 37–54)
HCT VFR BLD AUTO: 33.2 % (ref 37–54)
HGB BLD-MCNC: 11.2 G/DL (ref 12.5–16.5)
HGB BLD-MCNC: 7.7 G/DL (ref 12.5–16.5)
HGB BLD-MCNC: 8.8 G/DL (ref 12.5–16.5)
HHB: 0.3 % (ref 0–5)
HHB: 0.7 % (ref 0–5)
HHB: 1.9 % (ref 0–5)
HHB: 2.4 % (ref 0–5)
HHB: 79.5 % (ref 0–5)
IMM GRANULOCYTES # BLD AUTO: 0.27 K/UL (ref 0–0.58)
IMM GRANULOCYTES # BLD AUTO: 0.5 K/UL (ref 0–0.58)
IMM GRANULOCYTES NFR BLD: 1 % (ref 0–5)
IMM GRANULOCYTES NFR BLD: 2 % (ref 0–5)
INR PPP: 1.2
INR PPP: 1.5
KINETICS TEG: 0.8 MIN (ref 1–3)
KINETICS TEG: 2.8 MIN (ref 1–3)
LAB: ABNORMAL
LACTATE BLDV-SCNC: 13 MMOL/L (ref 0.5–2.2)
LACTATE BLDV-SCNC: 4.4 MMOL/L (ref 0.5–2.2)
LACTATE BLDV-SCNC: 9.8 MMOL/L (ref 0.5–2.2)
LIPASE SERPL-CCNC: 21 U/L (ref 13–60)
LY30 (LYSIS) TEG: 0 % (ref 0–8)
LY30 (LYSIS) TEG: 1.3 % (ref 0–8)
LYMPHOCYTES NFR BLD: 1.43 K/UL (ref 1.5–4)
LYMPHOCYTES NFR BLD: 1.83 K/UL (ref 1.5–4)
LYMPHOCYTES NFR BLD: 2.86 K/UL (ref 1.5–4)
LYMPHOCYTES RELATIVE PERCENT: 12 % (ref 20–42)
LYMPHOCYTES RELATIVE PERCENT: 7 % (ref 20–42)
LYMPHOCYTES RELATIVE PERCENT: 9 % (ref 20–42)
Lab: 1610
Lab: 1745
Lab: 1827
Lab: 2045
Lab: 2315
MA (MAX CLOT) TEG: 52.6 MM (ref 50–70)
MA (MAX CLOT) TEG: 74 MM (ref 50–70)
MAGNESIUM SERPL-MCNC: 1.9 MG/DL (ref 1.6–2.6)
MCH RBC QN AUTO: 29.9 PG (ref 26–35)
MCH RBC QN AUTO: 30.3 PG (ref 26–35)
MCH RBC QN AUTO: 30.3 PG (ref 26–35)
MCHC RBC AUTO-ENTMCNC: 30.9 G/DL (ref 32–34.5)
MCHC RBC AUTO-ENTMCNC: 32.4 G/DL (ref 32–34.5)
MCHC RBC AUTO-ENTMCNC: 33.7 G/DL (ref 32–34.5)
MCV RBC AUTO: 89.7 FL (ref 80–99.9)
MCV RBC AUTO: 92.5 FL (ref 80–99.9)
MCV RBC AUTO: 98 FL (ref 80–99.9)
METHADONE UR QL: NEGATIVE
METHB: 0.2 % (ref 0–1.5)
METHB: 0.3 % (ref 0–1.5)
METHB: 0.3 % (ref 0–1.5)
METHB: 0.4 % (ref 0–1.5)
METHB: 2.4 % (ref 0–1.5)
MODE: ABNORMAL
MODE: AC
MODE: AC
MONOCYTES NFR BLD: 0.92 K/UL (ref 0.1–0.95)
MONOCYTES NFR BLD: 1.2 K/UL (ref 0.1–0.95)
MONOCYTES NFR BLD: 1.38 K/UL (ref 0.1–0.95)
MONOCYTES NFR BLD: 4 % (ref 2–12)
MONOCYTES NFR BLD: 5 % (ref 2–12)
MONOCYTES NFR BLD: 6 % (ref 2–12)
NEGATIVE BASE EXCESS, ART: 13 MMOL/L
NEGATIVE BASE EXCESS, ART: 16.9 MMOL/L
NEUTROPHILS NFR BLD: 79 % (ref 43–80)
NEUTROPHILS NFR BLD: 85 % (ref 43–80)
NEUTROPHILS NFR BLD: 87 % (ref 43–80)
NEUTS SEG NFR BLD: 18.35 K/UL (ref 1.8–7.3)
NEUTS SEG NFR BLD: 18.43 K/UL (ref 1.8–7.3)
NEUTS SEG NFR BLD: 18.85 K/UL (ref 1.8–7.3)
O2 SATURATION: 18.3 % (ref 92–98.5)
O2 SATURATION: 97.6 % (ref 92–98.5)
O2 SATURATION: 98.1 % (ref 92–98.5)
O2 SATURATION: 99.3 % (ref 92–98.5)
O2 SATURATION: 99.7 % (ref 92–98.5)
O2HB: 17.8 % (ref 94–97)
O2HB: 97.1 % (ref 94–97)
O2HB: 97.1 % (ref 94–97)
O2HB: 98.4 % (ref 94–97)
O2HB: 98.9 % (ref 94–97)
OPERATOR ID: 1868
OPERATOR ID: 7291
OPERATOR ID: 7291
OPERATOR ID: 8215
OPERATOR ID: ABNORMAL
OPIATES UR QL SCN: NEGATIVE
OXYCODONE UR QL SCN: NEGATIVE
PARTIAL THROMBOPLASTIN TIME: 25.8 SEC (ref 24.5–35.1)
PARTIAL THROMBOPLASTIN TIME: 35.1 SEC (ref 24.5–35.1)
PATIENT TEMP: 37 C
PCO2: 19.5 MMHG (ref 35–45)
PCO2: 27.9 MMHG (ref 35–45)
PCO2: 30.3 MMHG (ref 35–45)
PCO2: 39.2 MMHG (ref 35–45)
PCO2: <15 MMHG (ref 35–45)
PCP UR QL SCN: NEGATIVE
PEEP/CPAP: 8 CMH2O
PEEP/CPAP: 8 CMH2O
PFO2: 1.23 MMHG/%
PFO2: 3.36 MMHG/%
PH BLOOD GAS: 7.13 (ref 7.35–7.45)
PH BLOOD GAS: 7.17 (ref 7.35–7.45)
PH BLOOD GAS: 7.37 (ref 7.35–7.45)
PH BLOOD GAS: 7.42 (ref 7.35–7.45)
PH BLOOD GAS: 7.49 (ref 7.35–7.45)
PHOSPHATE SERPL-MCNC: 6 MG/DL (ref 2.5–4.5)
PLATELET # BLD AUTO: 163 K/UL (ref 130–450)
PLATELET # BLD AUTO: 268 K/UL (ref 130–450)
PLATELET # BLD AUTO: 355 K/UL (ref 130–450)
PMV BLD AUTO: 10.3 FL (ref 7–12)
PMV BLD AUTO: 9.3 FL (ref 7–12)
PMV BLD AUTO: 9.8 FL (ref 7–12)
PO2: 107.3 MMHG (ref 75–100)
PO2: 122.5 MMHG (ref 75–100)
PO2: 199.1 MMHG (ref 75–100)
PO2: 335.7 MMHG (ref 75–100)
PO2: <20 MMHG (ref 75–100)
POC ANION GAP: 21 MMOL/L (ref 7–16)
POC ANION GAP: 21 MMOL/L (ref 7–16)
POC HCO3: 11.4 MMOL/L (ref 22–26)
POC HCO3: 14.6 MMOL/L (ref 22–26)
POC HEMOGLOBIN (CALC): 7.6 G/DL (ref 12.5–15.5)
POC HEMOGLOBIN (CALC): 7.9 G/DL (ref 12.5–15.5)
POC LACTIC ACID: 11.2 MMOL/L (ref 0.5–2.2)
POC LACTIC ACID: 11.5 MMOL/L (ref 0.5–2.2)
POC O2 SATURATION: 96.3 % (ref 92–98.5)
POC O2 SATURATION: 99.8 % (ref 92–98.5)
POC PCO2: 36.4 MM HG (ref 35–45)
POC PCO2: 40.1 MM HG (ref 35–45)
POC PH: 7.1 (ref 7.35–7.45)
POC PH: 7.17 (ref 7.35–7.45)
POC PO2: 112.5 MM HG (ref 80–100)
POC PO2: 270.3 MM HG (ref 80–100)
POTASSIUM BLD-SCNC: 4.4 MMOL/L (ref 3.5–5)
POTASSIUM BLD-SCNC: 4.5 MMOL/L (ref 3.5–5)
POTASSIUM SERPL-SCNC: 4.21 MMOL/L (ref 3.5–5)
POTASSIUM SERPL-SCNC: 4.4 MMOL/L (ref 3.5–5)
POTASSIUM SERPL-SCNC: 4.5 MMOL/L (ref 3.5–5)
POTASSIUM SERPL-SCNC: 5.2 MMOL/L (ref 3.5–5)
PROT SERPL-MCNC: 4.2 G/DL (ref 6.4–8.3)
PROT SERPL-MCNC: 5.5 G/DL (ref 6.4–8.3)
PROT SERPL-MCNC: 6.9 G/DL (ref 6.4–8.3)
PROTHROMBIN TIME: 12.9 SEC (ref 9.3–12.4)
PROTHROMBIN TIME: 16.1 SEC (ref 9.3–12.4)
RBC # BLD AUTO: 2.54 M/UL (ref 3.8–5.8)
RBC # BLD AUTO: 2.94 M/UL (ref 3.8–5.8)
RBC # BLD AUTO: 3.7 M/UL (ref 3.8–5.8)
RBC # BLD: ABNORMAL 10*6/UL
REACTION TIME TEG: 2.2 MIN (ref 5–10)
REACTION TIME TEG: 6.1 MIN (ref 5–10)
RI(T): 1.04
RI(T): 4.5
RR MECHANICAL: 20 B/MIN
RR MECHANICAL: 20 B/MIN
SALICYLATES SERPL-MCNC: <0.3 MG/DL (ref 0–30)
SALICYLATES SERPL-MCNC: <0.3 MG/DL (ref 0–30)
SODIUM BLD-SCNC: 144 MMOL/L (ref 132–146)
SODIUM BLD-SCNC: 146 MMOL/L (ref 132–146)
SODIUM SERPL-SCNC: 134 MMOL/L (ref 132–146)
SODIUM SERPL-SCNC: 135 MMOL/L (ref 132–146)
SODIUM SERPL-SCNC: 141 MMOL/L (ref 132–146)
SOURCE, BLOOD GAS: ABNORMAL
TEST INFORMATION: ABNORMAL
THB: 10.3 G/DL (ref 11.5–16.5)
THB: 12.2 G/DL (ref 11.5–16.5)
THB: 8.3 G/DL (ref 11.5–16.5)
THB: 8.5 G/DL (ref 11.5–16.5)
THB: 9.4 G/DL (ref 11.5–16.5)
TIME ANALYZED: 1614
TIME ANALYZED: 1756
TIME ANALYZED: 1831
TIME ANALYZED: 2049
TIME ANALYZED: 2317
TOXIC TRICYCLIC SC,BLOOD: NEGATIVE
TOXIC TRICYCLIC SC,BLOOD: NEGATIVE
VT MECHANICAL: 500 ML
VT MECHANICAL: 500 ML
WBC OTHER # BLD: 21.1 K/UL (ref 4.5–11.5)
WBC OTHER # BLD: 21.6 K/UL (ref 4.5–11.5)
WBC OTHER # BLD: 23.9 K/UL (ref 4.5–11.5)

## 2024-10-24 PROCEDURE — 2580000003 HC RX 258

## 2024-10-24 PROCEDURE — 2000000000 HC ICU R&B

## 2024-10-24 PROCEDURE — 73560 X-RAY EXAM OF KNEE 1 OR 2: CPT

## 2024-10-24 PROCEDURE — 3700000001 HC ADD 15 MINUTES (ANESTHESIA)

## 2024-10-24 PROCEDURE — 85347 COAGULATION TIME ACTIVATED: CPT

## 2024-10-24 PROCEDURE — 36620 INSERTION CATHETER ARTERY: CPT

## 2024-10-24 PROCEDURE — 93005 ELECTROCARDIOGRAM TRACING: CPT | Performed by: CLINICAL NURSE SPECIALIST

## 2024-10-24 PROCEDURE — 2500000003 HC RX 250 WO HCPCS

## 2024-10-24 PROCEDURE — P9073 PLATELETS PHERESIS PATH REDU: HCPCS

## 2024-10-24 PROCEDURE — 82803 BLOOD GASES ANY COMBINATION: CPT

## 2024-10-24 PROCEDURE — 6360000004 HC RX CONTRAST MEDICATION: Performed by: RADIOLOGY

## 2024-10-24 PROCEDURE — 73590 X-RAY EXAM OF LOWER LEG: CPT

## 2024-10-24 PROCEDURE — 31500 INSERT EMERGENCY AIRWAY: CPT

## 2024-10-24 PROCEDURE — 04LE3DZ OCCLUSION OF RIGHT INTERNAL ILIAC ARTERY WITH INTRALUMINAL DEVICE, PERCUTANEOUS APPROACH: ICD-10-PCS | Performed by: RADIOLOGY

## 2024-10-24 PROCEDURE — 73130 X-RAY EXAM OF HAND: CPT

## 2024-10-24 PROCEDURE — B41G1ZZ FLUOROSCOPY OF LEFT LOWER EXTREMITY ARTERIES USING LOW OSMOLAR CONTRAST: ICD-10-PCS | Performed by: RADIOLOGY

## 2024-10-24 PROCEDURE — 82805 BLOOD GASES W/O2 SATURATION: CPT

## 2024-10-24 PROCEDURE — 72170 X-RAY EXAM OF PELVIS: CPT

## 2024-10-24 PROCEDURE — 82330 ASSAY OF CALCIUM: CPT

## 2024-10-24 PROCEDURE — 87081 CULTURE SCREEN ONLY: CPT

## 2024-10-24 PROCEDURE — 96374 THER/PROPH/DIAG INJ IV PUSH: CPT

## 2024-10-24 PROCEDURE — 75736 ARTERY X-RAYS PELVIS: CPT

## 2024-10-24 PROCEDURE — 86900 BLOOD TYPING SEROLOGIC ABO: CPT

## 2024-10-24 PROCEDURE — 37244 VASC EMBOLIZE/OCCLUDE BLEED: CPT

## 2024-10-24 PROCEDURE — 83690 ASSAY OF LIPASE: CPT

## 2024-10-24 PROCEDURE — P9017 PLASMA 1 DONOR FRZ W/IN 8 HR: HCPCS

## 2024-10-24 PROCEDURE — 6360000002 HC RX W HCPCS

## 2024-10-24 PROCEDURE — 83605 ASSAY OF LACTIC ACID: CPT

## 2024-10-24 PROCEDURE — 85025 COMPLETE CBC W/AUTO DIFF WBC: CPT

## 2024-10-24 PROCEDURE — 36415 COLL VENOUS BLD VENIPUNCTURE: CPT

## 2024-10-24 PROCEDURE — 6810039000 HC L1 TRAUMA ALERT

## 2024-10-24 PROCEDURE — 70450 CT HEAD/BRAIN W/O DYE: CPT

## 2024-10-24 PROCEDURE — 86920 COMPATIBILITY TEST SPIN: CPT

## 2024-10-24 PROCEDURE — 86923 COMPATIBILITY TEST ELECTRIC: CPT

## 2024-10-24 PROCEDURE — 85576 BLOOD PLATELET AGGREGATION: CPT

## 2024-10-24 PROCEDURE — 80053 COMPREHEN METABOLIC PANEL: CPT

## 2024-10-24 PROCEDURE — 0BH17EZ INSERTION OF ENDOTRACHEAL AIRWAY INTO TRACHEA, VIA NATURAL OR ARTIFICIAL OPENING: ICD-10-PCS

## 2024-10-24 PROCEDURE — G0480 DRUG TEST DEF 1-7 CLASSES: HCPCS

## 2024-10-24 PROCEDURE — 72125 CT NECK SPINE W/O DYE: CPT

## 2024-10-24 PROCEDURE — B4101ZZ FLUOROSCOPY OF ABDOMINAL AORTA USING LOW OSMOLAR CONTRAST: ICD-10-PCS | Performed by: RADIOLOGY

## 2024-10-24 PROCEDURE — 85384 FIBRINOGEN ACTIVITY: CPT

## 2024-10-24 PROCEDURE — P9016 RBC LEUKOCYTES REDUCED: HCPCS

## 2024-10-24 PROCEDURE — 5A1955Z RESPIRATORY VENTILATION, GREATER THAN 96 CONSECUTIVE HOURS: ICD-10-PCS | Performed by: STUDENT IN AN ORGANIZED HEALTH CARE EDUCATION/TRAINING PROGRAM

## 2024-10-24 PROCEDURE — 74177 CT ABD & PELVIS W/CONTRAST: CPT

## 2024-10-24 PROCEDURE — 71045 X-RAY EXAM CHEST 1 VIEW: CPT

## 2024-10-24 PROCEDURE — 03HY32Z INSERTION OF MONITORING DEVICE INTO UPPER ARTERY, PERCUTANEOUS APPROACH: ICD-10-PCS

## 2024-10-24 PROCEDURE — 83735 ASSAY OF MAGNESIUM: CPT

## 2024-10-24 PROCEDURE — 80143 DRUG ASSAY ACETAMINOPHEN: CPT

## 2024-10-24 PROCEDURE — 36430 TRANSFUSION BLD/BLD COMPNT: CPT

## 2024-10-24 PROCEDURE — 80307 DRUG TEST PRSMV CHEM ANLYZR: CPT

## 2024-10-24 PROCEDURE — 80179 DRUG ASSAY SALICYLATE: CPT

## 2024-10-24 PROCEDURE — 72131 CT LUMBAR SPINE W/O DYE: CPT

## 2024-10-24 PROCEDURE — 85390 FIBRINOLYSINS SCREEN I&R: CPT

## 2024-10-24 PROCEDURE — 73110 X-RAY EXAM OF WRIST: CPT

## 2024-10-24 PROCEDURE — 85610 PROTHROMBIN TIME: CPT

## 2024-10-24 PROCEDURE — 72190 X-RAY EXAM OF PELVIS: CPT

## 2024-10-24 PROCEDURE — 75774 ARTERY X-RAY EACH VESSEL: CPT

## 2024-10-24 PROCEDURE — 85730 THROMBOPLASTIN TIME PARTIAL: CPT

## 2024-10-24 PROCEDURE — 85014 HEMATOCRIT: CPT

## 2024-10-24 PROCEDURE — 71260 CT THORAX DX C+: CPT

## 2024-10-24 PROCEDURE — 3700000000 HC ANESTHESIA ATTENDED CARE

## 2024-10-24 PROCEDURE — 36246 INS CATH ABD/L-EXT ART 2ND: CPT

## 2024-10-24 PROCEDURE — 94002 VENT MGMT INPAT INIT DAY: CPT

## 2024-10-24 PROCEDURE — 70486 CT MAXILLOFACIAL W/O DYE: CPT

## 2024-10-24 PROCEDURE — 86927 PLASMA FRESH FROZEN: CPT

## 2024-10-24 PROCEDURE — B41F1ZZ FLUOROSCOPY OF RIGHT LOWER EXTREMITY ARTERIES USING LOW OSMOLAR CONTRAST: ICD-10-PCS | Performed by: RADIOLOGY

## 2024-10-24 PROCEDURE — C1889 IMPLANT/INSERT DEVICE, NOC: HCPCS

## 2024-10-24 PROCEDURE — 36556 INSERT NON-TUNNEL CV CATH: CPT

## 2024-10-24 PROCEDURE — 72128 CT CHEST SPINE W/O DYE: CPT

## 2024-10-24 PROCEDURE — 0HQ1XZZ REPAIR FACE SKIN, EXTERNAL APPROACH: ICD-10-PCS

## 2024-10-24 PROCEDURE — 80047 BASIC METABLC PNL IONIZED CA: CPT

## 2024-10-24 PROCEDURE — 86901 BLOOD TYPING SEROLOGIC RH(D): CPT

## 2024-10-24 PROCEDURE — 37799 UNLISTED PX VASCULAR SURGERY: CPT

## 2024-10-24 PROCEDURE — P9012 CRYOPRECIPITATE EACH UNIT: HCPCS

## 2024-10-24 PROCEDURE — 6360000002 HC RX W HCPCS: Performed by: STUDENT IN AN ORGANIZED HEALTH CARE EDUCATION/TRAINING PROGRAM

## 2024-10-24 PROCEDURE — 99285 EMERGENCY DEPT VISIT HI MDM: CPT

## 2024-10-24 PROCEDURE — 84132 ASSAY OF SERUM POTASSIUM: CPT

## 2024-10-24 PROCEDURE — 86850 RBC ANTIBODY SCREEN: CPT

## 2024-10-24 PROCEDURE — 84100 ASSAY OF PHOSPHORUS: CPT

## 2024-10-24 PROCEDURE — 6360000002 HC RX W HCPCS: Performed by: RADIOLOGY

## 2024-10-24 PROCEDURE — 05HM33Z INSERTION OF INFUSION DEVICE INTO RIGHT INTERNAL JUGULAR VEIN, PERCUTANEOUS APPROACH: ICD-10-PCS | Performed by: STUDENT IN AN ORGANIZED HEALTH CARE EDUCATION/TRAINING PROGRAM

## 2024-10-24 PROCEDURE — C1887 CATHETER, GUIDING: HCPCS

## 2024-10-24 RX ORDER — MIDAZOLAM HYDROCHLORIDE 1 MG/ML
INJECTION, SOLUTION INTRAMUSCULAR; INTRAVENOUS
Status: COMPLETED
Start: 2024-10-24 | End: 2024-10-24

## 2024-10-24 RX ORDER — CALCIUM CHLORIDE 100 MG/ML
INJECTION INTRAVENOUS; INTRAVENTRICULAR
Status: DISCONTINUED | OUTPATIENT
Start: 2024-10-24 | End: 2024-10-24 | Stop reason: SDUPTHER

## 2024-10-24 RX ORDER — ONDANSETRON 2 MG/ML
4 INJECTION INTRAMUSCULAR; INTRAVENOUS ONCE
Status: DISCONTINUED | OUTPATIENT
Start: 2024-10-24 | End: 2024-10-30

## 2024-10-24 RX ORDER — SODIUM CHLORIDE 9 MG/ML
INJECTION, SOLUTION INTRAVENOUS PRN
Status: DISCONTINUED | OUTPATIENT
Start: 2024-10-24 | End: 2024-11-02

## 2024-10-24 RX ORDER — CALCIUM GLUCONATE 20 MG/ML
1000 INJECTION, SOLUTION INTRAVENOUS ONCE
Status: COMPLETED | OUTPATIENT
Start: 2024-10-24 | End: 2024-10-24

## 2024-10-24 RX ORDER — PROCHLORPERAZINE EDISYLATE 5 MG/ML
INJECTION INTRAMUSCULAR; INTRAVENOUS
Status: COMPLETED
Start: 2024-10-24 | End: 2024-10-24

## 2024-10-24 RX ORDER — 0.9 % SODIUM CHLORIDE 0.9 %
1000 INTRAVENOUS SOLUTION INTRAVENOUS ONCE
Status: DISCONTINUED | OUTPATIENT
Start: 2024-10-24 | End: 2024-10-24

## 2024-10-24 RX ORDER — SODIUM CHLORIDE 9 MG/ML
INJECTION, SOLUTION INTRAVENOUS PRN
Status: DISCONTINUED | OUTPATIENT
Start: 2024-10-24 | End: 2024-11-01

## 2024-10-24 RX ORDER — IOPAMIDOL 755 MG/ML
85 INJECTION, SOLUTION INTRAVASCULAR
Status: COMPLETED | OUTPATIENT
Start: 2024-10-24 | End: 2024-10-24

## 2024-10-24 RX ORDER — OXYCODONE HYDROCHLORIDE 10 MG/1
10 TABLET ORAL EVERY 4 HOURS PRN
Status: DISCONTINUED | OUTPATIENT
Start: 2024-10-24 | End: 2024-10-28

## 2024-10-24 RX ORDER — MIDAZOLAM HYDROCHLORIDE 1 MG/ML
INJECTION, SOLUTION INTRAMUSCULAR; INTRAVENOUS
Status: DISPENSED
Start: 2024-10-24 | End: 2024-10-25

## 2024-10-24 RX ORDER — SODIUM CHLORIDE 9 MG/ML
INJECTION, SOLUTION INTRAVENOUS
Status: DISCONTINUED | OUTPATIENT
Start: 2024-10-24 | End: 2024-10-24 | Stop reason: SDUPTHER

## 2024-10-24 RX ORDER — FENTANYL CITRATE 50 UG/ML
INJECTION, SOLUTION INTRAMUSCULAR; INTRAVENOUS
Status: DISCONTINUED | OUTPATIENT
Start: 2024-10-24 | End: 2024-10-24 | Stop reason: SDUPTHER

## 2024-10-24 RX ORDER — ROCURONIUM BROMIDE 10 MG/ML
INJECTION, SOLUTION INTRAVENOUS
Status: DISCONTINUED | OUTPATIENT
Start: 2024-10-24 | End: 2024-10-24 | Stop reason: SDUPTHER

## 2024-10-24 RX ORDER — SUCCINYLCHOLINE CHLORIDE 20 MG/ML
INJECTION INTRAMUSCULAR; INTRAVENOUS
Status: COMPLETED
Start: 2024-10-24 | End: 2024-10-24

## 2024-10-24 RX ORDER — IOPAMIDOL 755 MG/ML
INJECTION, SOLUTION INTRAVASCULAR PRN
Status: COMPLETED | OUTPATIENT
Start: 2024-10-24 | End: 2024-10-24

## 2024-10-24 RX ORDER — MINERAL OIL AND WHITE PETROLATUM 150; 830 MG/G; MG/G
OINTMENT OPHTHALMIC EVERY 4 HOURS
Status: DISCONTINUED | OUTPATIENT
Start: 2024-10-25 | End: 2024-11-05

## 2024-10-24 RX ORDER — ONDANSETRON 2 MG/ML
INJECTION INTRAMUSCULAR; INTRAVENOUS
Status: COMPLETED
Start: 2024-10-24 | End: 2024-10-24

## 2024-10-24 RX ORDER — MIDAZOLAM HYDROCHLORIDE 2 MG/2ML
4 INJECTION, SOLUTION INTRAMUSCULAR; INTRAVENOUS ONCE
Status: COMPLETED | OUTPATIENT
Start: 2024-10-24 | End: 2024-10-24

## 2024-10-24 RX ORDER — OXYCODONE HYDROCHLORIDE 5 MG/1
5 TABLET ORAL EVERY 4 HOURS PRN
Status: DISCONTINUED | OUTPATIENT
Start: 2024-10-24 | End: 2024-10-28

## 2024-10-24 RX ORDER — NOREPINEPHRINE BITARTRATE 0.06 MG/ML
1-100 INJECTION, SOLUTION INTRAVENOUS CONTINUOUS
Status: DISCONTINUED | OUTPATIENT
Start: 2024-10-24 | End: 2024-11-06

## 2024-10-24 RX ORDER — SODIUM CHLORIDE, SODIUM LACTATE, POTASSIUM CHLORIDE, CALCIUM CHLORIDE 600; 310; 30; 20 MG/100ML; MG/100ML; MG/100ML; MG/100ML
INJECTION, SOLUTION INTRAVENOUS CONTINUOUS
Status: DISCONTINUED | OUTPATIENT
Start: 2024-10-24 | End: 2024-10-29

## 2024-10-24 RX ORDER — SODIUM CHLORIDE 0.9 % (FLUSH) 0.9 %
10 SYRINGE (ML) INJECTION EVERY 12 HOURS SCHEDULED
Status: DISCONTINUED | OUTPATIENT
Start: 2024-10-24 | End: 2024-10-26

## 2024-10-24 RX ORDER — ONDANSETRON 2 MG/ML
4 INJECTION INTRAMUSCULAR; INTRAVENOUS EVERY 6 HOURS PRN
Status: DISCONTINUED | OUTPATIENT
Start: 2024-10-24 | End: 2024-11-14 | Stop reason: HOSPADM

## 2024-10-24 RX ORDER — SUCCINYLCHOLINE CHLORIDE 20 MG/ML
200 INJECTION INTRAMUSCULAR; INTRAVENOUS ONCE
Status: COMPLETED | OUTPATIENT
Start: 2024-10-24 | End: 2024-10-24

## 2024-10-24 RX ORDER — IOPAMIDOL 755 MG/ML
75 INJECTION, SOLUTION INTRAVASCULAR
Status: COMPLETED | OUTPATIENT
Start: 2024-10-24 | End: 2024-10-24

## 2024-10-24 RX ORDER — LIDOCAINE HYDROCHLORIDE AND EPINEPHRINE 10; 10 MG/ML; UG/ML
INJECTION, SOLUTION INFILTRATION; PERINEURAL
Status: DISPENSED
Start: 2024-10-24 | End: 2024-10-25

## 2024-10-24 RX ORDER — CHLORHEXIDINE GLUCONATE ORAL RINSE 1.2 MG/ML
15 SOLUTION DENTAL 2 TIMES DAILY
Status: DISCONTINUED | OUTPATIENT
Start: 2024-10-25 | End: 2024-11-06

## 2024-10-24 RX ORDER — SODIUM CHLORIDE 0.9 % (FLUSH) 0.9 %
10 SYRINGE (ML) INJECTION PRN
Status: DISCONTINUED | OUTPATIENT
Start: 2024-10-24 | End: 2024-10-26

## 2024-10-24 RX ORDER — HEPARIN SODIUM 10000 [USP'U]/ML
INJECTION, SOLUTION INTRAVENOUS; SUBCUTANEOUS PRN
Status: COMPLETED | OUTPATIENT
Start: 2024-10-24 | End: 2024-10-24

## 2024-10-24 RX ORDER — SODIUM CHLORIDE 9 MG/ML
INJECTION, SOLUTION INTRAVENOUS PRN
Status: DISCONTINUED | OUTPATIENT
Start: 2024-10-24 | End: 2024-10-25

## 2024-10-24 RX ORDER — METHOCARBAMOL 500 MG/1
1000 TABLET, FILM COATED ORAL 4 TIMES DAILY
Status: DISCONTINUED | OUTPATIENT
Start: 2024-10-24 | End: 2024-10-29

## 2024-10-24 RX ORDER — NOREPINEPHRINE BITARTRATE 0.06 MG/ML
INJECTION, SOLUTION INTRAVENOUS
Status: COMPLETED
Start: 2024-10-24 | End: 2024-10-24

## 2024-10-24 RX ORDER — SODIUM CHLORIDE, SODIUM LACTATE, POTASSIUM CHLORIDE, AND CALCIUM CHLORIDE .6; .31; .03; .02 G/100ML; G/100ML; G/100ML; G/100ML
1000 INJECTION, SOLUTION INTRAVENOUS ONCE
Status: COMPLETED | OUTPATIENT
Start: 2024-10-24 | End: 2024-10-24

## 2024-10-24 RX ORDER — ONDANSETRON 4 MG/1
4 TABLET, ORALLY DISINTEGRATING ORAL EVERY 8 HOURS PRN
Status: DISCONTINUED | OUTPATIENT
Start: 2024-10-24 | End: 2024-11-14 | Stop reason: HOSPADM

## 2024-10-24 RX ORDER — FENTANYL CITRATE-0.9 % NACL/PF 20 MCG/2ML
50 SYRINGE (ML) INTRAVENOUS EVERY 30 MIN PRN
Status: ACTIVE | OUTPATIENT
Start: 2024-10-24 | End: 2024-11-01

## 2024-10-24 RX ORDER — HYDROMORPHONE HYDROCHLORIDE 1 MG/ML
1 INJECTION, SOLUTION INTRAMUSCULAR; INTRAVENOUS; SUBCUTANEOUS
Status: DISCONTINUED | OUTPATIENT
Start: 2024-10-24 | End: 2024-10-28

## 2024-10-24 RX ORDER — ACETAMINOPHEN 325 MG/1
650 TABLET ORAL EVERY 4 HOURS PRN
Status: DISCONTINUED | OUTPATIENT
Start: 2024-10-24 | End: 2024-10-25

## 2024-10-24 RX ORDER — MIDAZOLAM HYDROCHLORIDE 2 MG/2ML
2 INJECTION, SOLUTION INTRAMUSCULAR; INTRAVENOUS ONCE
Status: COMPLETED | OUTPATIENT
Start: 2024-10-24 | End: 2024-10-24

## 2024-10-24 RX ORDER — TRANEXAMIC ACID 10 MG/ML
1000 INJECTION, SOLUTION INTRAVENOUS ONCE
Status: COMPLETED | OUTPATIENT
Start: 2024-10-24 | End: 2024-10-24

## 2024-10-24 RX ORDER — MIDAZOLAM HYDROCHLORIDE 1 MG/ML
1-10 INJECTION, SOLUTION INTRAVENOUS CONTINUOUS
Status: DISCONTINUED | OUTPATIENT
Start: 2024-10-24 | End: 2024-10-29

## 2024-10-24 RX ORDER — FENTANYL CITRATE 50 UG/ML
INJECTION, SOLUTION INTRAMUSCULAR; INTRAVENOUS DAILY PRN
Status: COMPLETED | OUTPATIENT
Start: 2024-10-24 | End: 2024-10-24

## 2024-10-24 RX ORDER — PROPOFOL 10 MG/ML
INJECTION, EMULSION INTRAVENOUS
Status: COMPLETED
Start: 2024-10-24 | End: 2024-10-24

## 2024-10-24 RX ORDER — VECURONIUM BROMIDE 1 MG/ML
INJECTION, POWDER, LYOPHILIZED, FOR SOLUTION INTRAVENOUS
Status: DISCONTINUED | OUTPATIENT
Start: 2024-10-24 | End: 2024-10-24 | Stop reason: SDUPTHER

## 2024-10-24 RX ORDER — POLYETHYLENE GLYCOL 3350 17 G/17G
17 POWDER, FOR SOLUTION ORAL DAILY
Status: DISCONTINUED | OUTPATIENT
Start: 2024-10-24 | End: 2024-11-04

## 2024-10-24 RX ORDER — FENTANYL CITRATE-0.9 % NACL/PF 10 MCG/ML
25-200 PLASTIC BAG, INJECTION (ML) INTRAVENOUS CONTINUOUS
Status: DISCONTINUED | OUTPATIENT
Start: 2024-10-24 | End: 2024-11-05

## 2024-10-24 RX ORDER — VASOPRESSIN 20 U/ML
INJECTION PARENTERAL
Status: DISCONTINUED | OUTPATIENT
Start: 2024-10-24 | End: 2024-10-24 | Stop reason: SDUPTHER

## 2024-10-24 RX ORDER — GABAPENTIN 300 MG/1
300 CAPSULE ORAL EVERY 8 HOURS
Status: DISCONTINUED | OUTPATIENT
Start: 2024-10-24 | End: 2024-10-27 | Stop reason: DRUGHIGH

## 2024-10-24 RX ORDER — KETAMINE HYDROCHLORIDE 100 MG/ML
2 INJECTION, SOLUTION INTRAMUSCULAR; INTRAVENOUS ONCE
Status: DISCONTINUED | OUTPATIENT
Start: 2024-10-24 | End: 2024-10-24

## 2024-10-24 RX ORDER — METHOCARBAMOL 500 MG/1
1000 TABLET, FILM COATED ORAL 4 TIMES DAILY
Status: DISCONTINUED | OUTPATIENT
Start: 2024-10-24 | End: 2024-10-24 | Stop reason: SDUPTHER

## 2024-10-24 RX ORDER — ACETAMINOPHEN 325 MG/1
650 TABLET ORAL EVERY 6 HOURS
Status: DISCONTINUED | OUTPATIENT
Start: 2024-10-24 | End: 2024-11-01

## 2024-10-24 RX ORDER — PROCHLORPERAZINE EDISYLATE 5 MG/ML
10 INJECTION INTRAMUSCULAR; INTRAVENOUS EVERY 6 HOURS PRN
Status: DISCONTINUED | OUTPATIENT
Start: 2024-10-24 | End: 2024-11-14 | Stop reason: HOSPADM

## 2024-10-24 RX ADMIN — FENTANYL CITRATE 100 MCG: 50 INJECTION, SOLUTION INTRAMUSCULAR; INTRAVENOUS at 21:32

## 2024-10-24 RX ADMIN — MIDAZOLAM HYDROCHLORIDE 2 MG: 2 INJECTION, SOLUTION INTRAMUSCULAR; INTRAVENOUS at 19:04

## 2024-10-24 RX ADMIN — Medication 50 MEQ: at 21:00

## 2024-10-24 RX ADMIN — HYDROMORPHONE HYDROCHLORIDE 1 MG: 1 INJECTION, SOLUTION INTRAMUSCULAR; INTRAVENOUS; SUBCUTANEOUS at 18:10

## 2024-10-24 RX ADMIN — CALCIUM GLUCONATE 1000 MG: 20 INJECTION, SOLUTION INTRAVENOUS at 21:00

## 2024-10-24 RX ADMIN — VASOPRESSIN 0.03 UNITS/MIN: 20 INJECTION INTRAVENOUS at 20:56

## 2024-10-24 RX ADMIN — MIDAZOLAM 4 MG: 1 INJECTION INTRAMUSCULAR; INTRAVENOUS at 19:22

## 2024-10-24 RX ADMIN — SODIUM CHLORIDE: 9 INJECTION, SOLUTION INTRAVENOUS at 22:26

## 2024-10-24 RX ADMIN — SODIUM BICARBONATE 50 MEQ: 84 INJECTION, SOLUTION INTRAVENOUS at 22:03

## 2024-10-24 RX ADMIN — Medication 100 MG: at 19:22

## 2024-10-24 RX ADMIN — SUCCINYLCHOLINE CHLORIDE 200 MG: 20 INJECTION, SOLUTION INTRAMUSCULAR; INTRAVENOUS at 19:23

## 2024-10-24 RX ADMIN — Medication 5000 UNITS: at 22:02

## 2024-10-24 RX ADMIN — Medication 50 MCG: at 20:26

## 2024-10-24 RX ADMIN — SODIUM BICARBONATE 50 MEQ: 84 INJECTION INTRAVENOUS at 21:00

## 2024-10-24 RX ADMIN — PROPOFOL 160 MG: 10 INJECTION, EMULSION INTRAVENOUS at 18:20

## 2024-10-24 RX ADMIN — PHYTONADIONE 10 MG: 10 INJECTION, EMULSION INTRAMUSCULAR; INTRAVENOUS; SUBCUTANEOUS at 18:34

## 2024-10-24 RX ADMIN — FENTANYL CITRATE 50 MCG: 50 INJECTION, SOLUTION INTRAMUSCULAR; INTRAVENOUS at 16:13

## 2024-10-24 RX ADMIN — IOPAMIDOL 85 ML: 755 INJECTION, SOLUTION INTRAVENOUS at 23:14

## 2024-10-24 RX ADMIN — ROCURONIUM BROMIDE 50 MG: 10 INJECTION, SOLUTION INTRAVENOUS at 21:32

## 2024-10-24 RX ADMIN — VECURONIUM BROMIDE 10 MG: 10 INJECTION, POWDER, LYOPHILIZED, FOR SOLUTION INTRAVENOUS at 21:44

## 2024-10-24 RX ADMIN — SODIUM BICARBONATE 50 MEQ: 84 INJECTION INTRAVENOUS at 20:55

## 2024-10-24 RX ADMIN — SODIUM CHLORIDE, POTASSIUM CHLORIDE, SODIUM LACTATE AND CALCIUM CHLORIDE: 600; 310; 30; 20 INJECTION, SOLUTION INTRAVENOUS at 18:04

## 2024-10-24 RX ADMIN — PROCHLORPERAZINE EDISYLATE 10 MG: 5 INJECTION INTRAMUSCULAR; INTRAVENOUS at 18:49

## 2024-10-24 RX ADMIN — SODIUM CHLORIDE, PRESERVATIVE FREE 10 ML: 5 INJECTION INTRAVENOUS at 22:21

## 2024-10-24 RX ADMIN — Medication 5 MCG/MIN: at 18:29

## 2024-10-24 RX ADMIN — Medication 20 MG: at 18:20

## 2024-10-24 RX ADMIN — MIDAZOLAM 2 MG: 1 INJECTION INTRAMUSCULAR; INTRAVENOUS at 19:04

## 2024-10-24 RX ADMIN — SODIUM BICARBONATE 50 MEQ: 84 INJECTION, SOLUTION INTRAVENOUS at 21:59

## 2024-10-24 RX ADMIN — CALCIUM GLUCONATE 1000 MG: 20 INJECTION, SOLUTION INTRAVENOUS at 20:45

## 2024-10-24 RX ADMIN — IOPAMIDOL 60 ML: 755 INJECTION, SOLUTION INTRAVENOUS at 22:46

## 2024-10-24 RX ADMIN — TRANEXAMIC ACID 1000 MG: 10 INJECTION, SOLUTION INTRAVENOUS at 21:47

## 2024-10-24 RX ADMIN — VASOPRESSIN 2 UNITS: 20 INJECTION INTRAVENOUS at 21:47

## 2024-10-24 RX ADMIN — SODIUM CHLORIDE: 9 INJECTION, SOLUTION INTRAVENOUS at 23:05

## 2024-10-24 RX ADMIN — Medication 50 MEQ: at 20:55

## 2024-10-24 RX ADMIN — Medication 2 MG/HR: at 20:12

## 2024-10-24 RX ADMIN — ONDANSETRON 4 MG: 2 INJECTION INTRAMUSCULAR; INTRAVENOUS at 18:25

## 2024-10-24 RX ADMIN — SUCCINYLCHOLINE CHLORIDE 200 MG: 20 INJECTION INTRAMUSCULAR; INTRAVENOUS at 19:23

## 2024-10-24 RX ADMIN — Medication 50 MCG/HR: at 20:11

## 2024-10-24 RX ADMIN — IOPAMIDOL 75 ML: 755 INJECTION, SOLUTION INTRAVENOUS at 16:53

## 2024-10-24 RX ADMIN — SODIUM CHLORIDE, POTASSIUM CHLORIDE, SODIUM LACTATE AND CALCIUM CHLORIDE 1000 ML: 600; 310; 30; 20 INJECTION, SOLUTION INTRAVENOUS at 18:51

## 2024-10-24 RX ADMIN — SODIUM CHLORIDE: 9 INJECTION, SOLUTION INTRAVENOUS at 21:32

## 2024-10-24 RX ADMIN — CALCIUM CHLORIDE 1 G: 100 INJECTION, SOLUTION INTRAVENOUS; INTRAVENTRICULAR at 21:46

## 2024-10-24 RX ADMIN — MIDAZOLAM HYDROCHLORIDE 4 MG: 2 INJECTION, SOLUTION INTRAMUSCULAR; INTRAVENOUS at 19:22

## 2024-10-24 ASSESSMENT — PULMONARY FUNCTION TESTS
PIF_VALUE: 17
PIF_VALUE: 18
PIF_VALUE: 27
PIF_VALUE: 15
PIF_VALUE: 17
PIF_VALUE: 27
PIF_VALUE: 24
PIF_VALUE: 29
PIF_VALUE: 25
PIF_VALUE: 15
PIF_VALUE: 15
PIF_VALUE: 25
PIF_VALUE: 28
PIF_VALUE: 34
PIF_VALUE: 16
PIF_VALUE: 16
PIF_VALUE: 25
PIF_VALUE: 10
PIF_VALUE: 10
PIF_VALUE: 16
PIF_VALUE: 16
PIF_VALUE: 18
PIF_VALUE: 13
PIF_VALUE: 28
PIF_VALUE: 15
PIF_VALUE: 15

## 2024-10-24 ASSESSMENT — PAIN SCALES - GENERAL
PAINLEVEL_OUTOF10: 2
PAINLEVEL_OUTOF10: 10

## 2024-10-24 ASSESSMENT — PAIN DESCRIPTION - DESCRIPTORS: DESCRIPTORS: SORE;SHARP;TENDER

## 2024-10-24 ASSESSMENT — PAIN DESCRIPTION - LOCATION
LOCATION: HIP;LEG
LOCATION: HIP;LEG

## 2024-10-24 ASSESSMENT — PAIN DESCRIPTION - ORIENTATION: ORIENTATION: RIGHT;LEFT

## 2024-10-24 NOTE — PROGRESS NOTES
Orthopedic progress note    Patient intubated and sedated.  Patient returned from IR for embolization of his right internally iliac.  Prior to embolization trauma team was unable to obtain a blood pressure so multiple attempts and A-line were done on the left upper extremity or failed had to resort to the right upper extremity.  Patient's pressures are extremely low receiving multiple transfusions of blood products.    X-rays right hand reviewed which demonstrates acute on chronic distal radius fracture.  Acute fracture is extra-articular, impacted with some comminution.  There is a small dorsal rim fragment that is displaced.  Patient does appear to have chronic posttraumatic changes appreciated at the distal radius with the fracture extending through the visualized callus.  There is also an associated chronic ulnar styloid fracture.    X-ray left knee/tib-fib demonstrate an acute lateral tibial plateau fracture that is displaced and comminuted.  No other acute fracture dislocation appreciated distally.    Patient's right upper extremity was placed in a well-padded clamshell splint with a window for the A-line.  Nonweightbearing to right upper extremity  Left lower extremity wrapped in Webril followed by Ace wrap with a knee immobilizer.  Nonweightbearing to left lower extremity.  Multimodal pain control per SICU  Continue medical care per SICU  Discuss with attending        Electronically signed by Michael Coker DO on 10/24/2024 at 7:24 PM

## 2024-10-24 NOTE — PROCEDURES
PROCEDURE NOTE  Date: 10/24/2024   Name: Glenroy Simmons II  YOB: 1975      Anesthesia: 1% Lidocaine with Epinephrine    Laceration #: 1.  Location: 8 cm triangular forehead    Length: 8cm.    The wound area was irrigated with sterile saline, cleansed with povidone iodine and draped in a sterile fashion.  The wound area was anesthetized with Lidocaine 1% with epinephrine.    Wound complexity:    Debridement: partial thickness and None.  Undermining: partial thickness and None.  Wound Margins Revised: yes.  Flaps Aligned: yes.  The wound was explored with the following results No foreign bodies found, no foreign body or tendon injury seen.  The wound was closed in to layers with 3-0 Vicryl suture, second layer with 4-0 chromic. using running sutures.  Dressing:  bacitracin was placed.       Electronically signed by Annelise Pedroza DO on 10/24/2024 at 5:43 PM
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
## 2024-10-24 NOTE — PROGRESS NOTES
Assessment & Plan       ICD-10-CM    1. Dental infection  K04.7 amoxicillin-clavulanate (AUGMENTIN) 875-125 MG tablet      2. History of candidiasis of vagina  Z86.19 fluconazole (DIFLUCAN) 150 MG tablet           To take antibiotic as directed and follow up with dentist.   Patient Instructions   Take daily probiotic while on antibiotic and for 10 days after (ex. Culturelle), greek yogurt or activia    Gargle with hot salty water    Return if symptoms worsen or fail to improve, for Follow up.    At the end of the encounter, I discussed results, diagnosis, medications. Discussed red flags for immediate return to clinic/ER, as well as indications for follow up if no improvement. Patient understood and agreed to plan. Patient was stable for discharge.    Usman Shelton is a 40 year old female who presents to clinic today for the following health issues:  Chief Complaint   Patient presents with     Urgent Care     Dental Pain     Possible infection lower tooth. Hurts when she opens her mouth. Pt do have bracelets.     40-year-old female presents with reports of dental pain and pain under her chin.  She reports she has history of a pocket in her mouth that has seen the dentist in the past and told that this may lead to infection.  She denies foul taste in her mouth or fever.  She denies any shortness of breath.  She does report that the pain radiates to her ear.          Review of Systems   Constitutional: Negative.    HENT: Positive for dental problem.    Respiratory: Negative.    Cardiovascular: Negative.        Problem List:  2014-08: Chemical dependency (H)  2014-08: Altered mental status  2012-10: Overdose  Lumbar contusion      Past Medical History:   Diagnosis Date     Alcohol abuse      Carpal tunnel syndrome      Deliberate self-cutting      h/o Postpartum depression      Lumbar contusion      Methadone misuse      Methamphetamine abuse      MRSA (methicillin resistant Staphylococcus aureus) 8/4/2014     Patient admitted to SICU with the following belongings: shirt, sweatshirt, shorts, socks, shoes, wallet containing 's license, bank card, insurance cards, and $40.00 cash.None, were sent home with the patient's family.    Mateoleticia     Opioid dependence        Social History     Tobacco Use     Smoking status: Every Day     Packs/day: 1.00     Years: 10.00     Pack years: 10.00     Types: Cigarettes     Smokeless tobacco: Never   Vaping Use     Vaping status: Not on file   Substance Use Topics     Alcohol use: No           Objective    /83   Pulse 67   Temp 97.7  F (36.5  C) (Tympanic)   LMP 04/16/2023   SpO2 98%   Physical Exam  Constitutional:       Appearance: Normal appearance.   HENT:      Head: Normocephalic and atraumatic.   Musculoskeletal:      Cervical back: Normal range of motion and neck supple.   Lymphadenopathy:      Cervical: Cervical adenopathy present.   Skin:     General: Skin is warm and dry.   Neurological:      General: No focal deficit present.      Mental Status: She is alert and oriented to person, place, and time.   Psychiatric:         Mood and Affect: Mood normal.         Behavior: Behavior normal.         Thought Content: Thought content normal.         Judgment: Judgment normal.              Elfego Campbell PA-C
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
## 2024-10-24 NOTE — ED NOTES
Patient log rolled with c spine maintained. Thoracis lumbar tenderness, left hip tenderness. No other signs of trauma.
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
## 2024-10-24 NOTE — PROGRESS NOTES
Patient arrived to the Surgical ICU from ED with melendez catheter intact and patent. Securing device applied. Melendez bag is hanging below the level of the bladder, safety clip attached to the bed sheet, tamper seal is intact, drainage bag is not on the floor, lack of dependent loop in tubing, and the drainage bag is less than half full.

## 2024-10-24 NOTE — PROGRESS NOTES
4 Eyes Skin Assessment     NAME:  Glenroy Simmons II  YOB: 1975  MEDICAL RECORD NUMBER:  [id number]    The patient is being assessed for  Admission    I agree that at least one RN has performed a thorough Head to Toe Skin Assessment on the patient. ALL assessment sites listed below have been assessed.      Areas assessed by both nurses:    Head, Face, Ears, Shoulders, Back, Chest, Arms, Elbows, Hands, Sacrum. Buttock, Coccyx, Ischium, Legs. Feet and Heels, and Under Medical Devices         Does the Patient have a Wound? No noted wound(s)    Tears LLE/shin  Vasc discoloration BLE  Forehead laceration sutured  Scattered abrasions: L hand knuckle, L shin, R knee  Ecchymosis Chin/neck  Ecchymosis lips, BL knees       Anthony Prevention initiated by RN: Yes  Wound Care Orders initiated by RN: No    Pressure Injury (Stage 3,4, Unstageable, DTI, NWPT, and Complex wounds) if present, place Wound referral order by RN under : No    New Ostomies, if present place, Ostomy referral order under : No     Nurse 1 eSignature: Electronically signed by Latisha Ceja RN on 10/24/24 at 7:56 PM EDT    **SHARE this note so that the co-signing nurse can place an eSignature**    Nurse 2 eSignature: Electronically signed by Dorita Malloy on 10/24/24 at 10:40 PM EDT

## 2024-10-24 NOTE — PLAN OF CARE
Problem: Discharge Planning  Goal: Discharge to home or other facility with appropriate resources  Outcome: Progressing     Problem: Pain  Goal: Verbalizes/displays adequate comfort level or baseline comfort level  Outcome: Progressing     Problem: Safety - Adult  Goal: Free from fall injury  Outcome: Progressing     Problem: Skin/Tissue Integrity  Goal: Absence of new skin breakdown  Outcome: Progressing      [][place] [address]      [facility]     Outpatient Pediatric Rehab Dept      Outpatient Pediatric Rehab Dept     [address], [address], [address] [facility],Third Floor       Nela Coleman, [address]     [phone]     Fax [phone]        Fax: [phone]    [][place] [address]          [address], 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 45 45 45     Timed Tx Min. 45 45 45     Charges 3 3 3     Pain (0-10) 0 0 0     Subjective/  Adverse Reaction to tx PT mask donned. No changes. PT mask donned. Mom states D has been doing [place] and enjoying. PT donned mask. No changes. Mom w v/u of use of dowel darrell for ascending curb step     GOALS        1. Deacon to demonstrate emerging single limb stance for 2-3 seconds in 3/5 trials. Fair balance and CGA to recover from LOB when stepping up curb step from parking lot.       Required CGA and manual cues for \"giant steps\" and stepping over  Activities to encourage SLS balance  Stepping \"over\" obstacle course with hula hoop; pool noodle and 1\" sticks - manual assist for lateral wt shift to allow lifting foot/leg over vs onto obstacle - pool noodle too high; stick - able to step over on 1/10 occasions wihtout manual assist Continues to hesitate with ascending curb step; resists HHa but allows PT manual cue for wt shift through pelvis       2. Deacon to walk independently on unlevel surface for at least 10 steps in 3/5 trials without fall to floor         Locomotor activity: \"fiant steps\" stepping over jumprope; floor ladder )flat) w trying to step over yellow rungs - unable without CGA (use of wooden stick for HHa as D resists help) Piano mat - narrowed MAYLIN with sheet covering half of mat - difficult with mod body sway and wanting to widen MAYLIN by stepping off mat Achieved with wide MAYLIN - w vc and model of arm swing D attempts for several cycles but reverts back to elbows flexed. 3.Deacon to ascend/descend 1\" mat surface independently without fall to floor in 5/8 trials                    Mat surface: with min LOB and ability to recover by self - foot not fully on mat during navigating on and off mat for play LOB on 1/2 occasions with fwd flex to hands on mat to recover    Cosmic Yoga - \"3 pigs\" poses - up and down from mat on many occasions Steps ups onto 2\" mat with use of dowel darrell that PT holds for support and D more accepting of this - still with mild  of time but able to correct without fall to floor     4. Deacon to ambulate 30' with trunk rotation, oppositional arm movement and narrowing base of support with increased pace on level ground          Use of 1# wts in hands and vc for straightening elbows during gait - wide MAYLIN and arms in high guard/elbows flexed posture during majority of gait without vc Able to follow vc    Outdoor ambulation with watering bucket for plant watering - up/dn from grass to pathway with close SBA and LOB with momentum carrying him several steps after step up     Tall kneel on rocker board with lateral wt shift during play on mat table     Standing with back to wall on rocker board with lateral wt shift being elicited by PT and D very rigid and reluctant to use trunk righting reactions        6.  Education:      Suggested vivek perez for stepping

## 2024-10-25 ENCOUNTER — APPOINTMENT (OUTPATIENT)
Dept: GENERAL RADIOLOGY | Age: 49
End: 2024-10-25
Payer: MEDICARE

## 2024-10-25 ENCOUNTER — APPOINTMENT (OUTPATIENT)
Age: 49
End: 2024-10-25
Payer: MEDICARE

## 2024-10-25 PROBLEM — E88.09 HYPOALBUMINEMIA: Status: ACTIVE | Noted: 2024-10-25

## 2024-10-25 PROBLEM — T84.029A DISLOCATION OF HIP PROSTHESIS (HCC): Status: ACTIVE | Noted: 2024-10-25

## 2024-10-25 PROBLEM — D62 ACUTE BLOOD LOSS ANEMIA: Status: ACTIVE | Noted: 2024-10-25

## 2024-10-25 PROBLEM — J96.01 ACUTE RESPIRATORY FAILURE WITH HYPOXIA: Status: ACTIVE | Noted: 2024-10-25

## 2024-10-25 PROBLEM — Z96.649 PERI-PROSTHETIC FRACTURE AROUND PROSTHETIC HIP: Status: ACTIVE | Noted: 2024-10-25

## 2024-10-25 PROBLEM — S01.01XA SCALP LACERATION: Status: ACTIVE | Noted: 2024-10-25

## 2024-10-25 PROBLEM — J96.00 ACUTE RESPIRATORY FAILURE: Status: ACTIVE | Noted: 2024-10-25

## 2024-10-25 PROBLEM — E87.20 LACTIC ACID ACIDOSIS: Status: ACTIVE | Noted: 2024-10-25

## 2024-10-25 PROBLEM — R79.89 ELEVATED LFTS: Status: ACTIVE | Noted: 2024-10-25

## 2024-10-25 PROBLEM — I46.9 CARDIAC ARREST: Status: ACTIVE | Noted: 2024-10-25

## 2024-10-25 PROBLEM — M97.8XXA PERI-PROSTHETIC FRACTURE AROUND PROSTHETIC HIP: Status: ACTIVE | Noted: 2024-10-25

## 2024-10-25 PROBLEM — E87.5 HYPERKALEMIA: Status: ACTIVE | Noted: 2024-10-25

## 2024-10-25 PROBLEM — E83.51 HYPOCALCEMIA: Status: ACTIVE | Noted: 2024-10-25

## 2024-10-25 PROBLEM — S32.811A MULTIPLE CLOSED FRACTURES OF PELVIS WITH UNSTABLE DISRUPTION OF PELVIC CIRCLE (HCC): Status: ACTIVE | Noted: 2024-10-25

## 2024-10-25 PROBLEM — N17.9 ACUTE KIDNEY INJURY (HCC): Status: ACTIVE | Noted: 2024-10-25

## 2024-10-25 PROBLEM — I21.3 ST ELEVATION MYOCARDIAL INFARCTION (STEMI) (HCC): Status: ACTIVE | Noted: 2024-10-25

## 2024-10-25 PROBLEM — Z96.649 DISLOCATION OF HIP PROSTHESIS (HCC): Status: ACTIVE | Noted: 2024-10-25

## 2024-10-25 PROBLEM — J94.2 HEMOTHORAX ON LEFT: Status: ACTIVE | Noted: 2024-10-25

## 2024-10-25 LAB
AADO2: 423.8 MMHG
AADO2: 456.5 MMHG
AADO2: 521.5 MMHG
AADO2: 540.2 MMHG
AADO2: 613.1 MMHG
AADO2: 616.5 MMHG
ALBUMIN SERPL-MCNC: 1.9 G/DL (ref 3.5–5.2)
ALBUMIN SERPL-MCNC: 2.4 G/DL (ref 3.5–5.2)
ALBUMIN SERPL-MCNC: 2.7 G/DL (ref 3.5–5.2)
ALP SERPL-CCNC: 66 U/L (ref 40–129)
ALP SERPL-CCNC: 77 U/L (ref 40–129)
ALP SERPL-CCNC: 83 U/L (ref 40–129)
ALT SERPL-CCNC: 107 U/L (ref 0–40)
ALT SERPL-CCNC: 484 U/L (ref 0–40)
ALT SERPL-CCNC: 630 U/L (ref 0–40)
ANION GAP SERPL CALCULATED.3IONS-SCNC: 10 MMOL/L (ref 7–16)
ANION GAP SERPL CALCULATED.3IONS-SCNC: 12 MMOL/L (ref 7–16)
ANION GAP SERPL CALCULATED.3IONS-SCNC: 16 MMOL/L (ref 7–16)
ANION GAP SERPL CALCULATED.3IONS-SCNC: 19 MMOL/L (ref 7–16)
ANION GAP SERPL CALCULATED.3IONS-SCNC: 9 MMOL/L (ref 7–16)
ARM BAND NUMBER: NORMAL
AST SERPL-CCNC: 1525 U/L (ref 0–39)
AST SERPL-CCNC: 159 U/L (ref 0–39)
AST SERPL-CCNC: 606 U/L (ref 0–39)
B.E.: -1.9 MMOL/L (ref -3–3)
B.E.: -12.4 MMOL/L (ref -3–3)
B.E.: -17.6 MMOL/L (ref -3–3)
B.E.: -3.5 MMOL/L (ref -3–3)
B.E.: -5.7 MMOL/L (ref -3–3)
B.E.: -8.1 MMOL/L (ref -3–3)
BASOPHILS # BLD: 0.01 K/UL (ref 0–0.2)
BASOPHILS # BLD: 0.02 K/UL (ref 0–0.2)
BASOPHILS # BLD: 0.02 K/UL (ref 0–0.2)
BASOPHILS NFR BLD: 0 % (ref 0–2)
BILIRUB SERPL-MCNC: 0.6 MG/DL (ref 0–1.2)
BILIRUB SERPL-MCNC: 0.7 MG/DL (ref 0–1.2)
BILIRUB SERPL-MCNC: 1.2 MG/DL (ref 0–1.2)
BLOOD BANK BLOOD PRODUCT EXPIRATION DATE: NORMAL
BLOOD BANK DISPENSE STATUS: NORMAL
BLOOD BANK ISBT PRODUCT BLOOD TYPE: 600
BLOOD BANK ISBT PRODUCT BLOOD TYPE: 600
BLOOD BANK ISBT PRODUCT BLOOD TYPE: 6200
BLOOD BANK PRODUCT CODE: NORMAL
BLOOD BANK UNIT TYPE AND RH: NORMAL
BPU ID: NORMAL
BUN SERPL-MCNC: 20 MG/DL (ref 6–20)
BUN SERPL-MCNC: 21 MG/DL (ref 6–20)
BUN SERPL-MCNC: 24 MG/DL (ref 6–20)
BUN SERPL-MCNC: 25 MG/DL (ref 6–20)
BUN SERPL-MCNC: 26 MG/DL (ref 6–20)
CA-I BLD-SCNC: 1 MMOL/L (ref 1.15–1.33)
CA-I BLD-SCNC: 1 MMOL/L (ref 1.15–1.33)
CA-I BLD-SCNC: 1.05 MMOL/L (ref 1.15–1.33)
CA-I BLD-SCNC: 1.07 MMOL/L (ref 1.15–1.33)
CA-I BLD-SCNC: 1.11 MMOL/L (ref 1.15–1.33)
CALCIUM SERPL-MCNC: 6.6 MG/DL (ref 8.6–10.2)
CALCIUM SERPL-MCNC: 6.9 MG/DL (ref 8.6–10.2)
CALCIUM SERPL-MCNC: 6.9 MG/DL (ref 8.6–10.2)
CALCIUM SERPL-MCNC: 7 MG/DL (ref 8.6–10.2)
CALCIUM SERPL-MCNC: 7.2 MG/DL (ref 8.6–10.2)
CHLORIDE SERPL-SCNC: 109 MMOL/L (ref 98–107)
CHLORIDE SERPL-SCNC: 110 MMOL/L (ref 98–107)
CHLORIDE SERPL-SCNC: 111 MMOL/L (ref 98–107)
CHLORIDE UR-SCNC: 42 MMOL/L
CK SERPL-CCNC: 8527 U/L (ref 20–200)
CK SERPL-CCNC: ABNORMAL U/L (ref 20–200)
CLOT ANGLE.KAOLIN INDUCED BLD RES TEG: 42.1 DEG (ref 53–70)
CLOT ANGLE.KAOLIN INDUCED BLD RES TEG: 59.5 DEG (ref 53–70)
CO2 SERPL-SCNC: 15 MMOL/L (ref 22–29)
CO2 SERPL-SCNC: 16 MMOL/L (ref 22–29)
CO2 SERPL-SCNC: 20 MMOL/L (ref 22–29)
CO2 SERPL-SCNC: 21 MMOL/L (ref 22–29)
CO2 SERPL-SCNC: 22 MMOL/L (ref 22–29)
COHB: 0.2 % (ref 0–1.5)
COHB: 0.2 % (ref 0–1.5)
COHB: 0.3 % (ref 0–1.5)
COHB: 0.7 % (ref 0–1.5)
COMPONENT: NORMAL
CREAT SERPL-MCNC: 1.8 MG/DL (ref 0.7–1.2)
CREAT SERPL-MCNC: 1.9 MG/DL (ref 0.7–1.2)
CREAT SERPL-MCNC: 2.1 MG/DL (ref 0.7–1.2)
CREAT SERPL-MCNC: 2.2 MG/DL (ref 0.7–1.2)
CREAT SERPL-MCNC: 2.2 MG/DL (ref 0.7–1.2)
CREAT UR-MCNC: 93.6 MG/DL (ref 40–278)
CRITICAL: ABNORMAL
DATE ANALYZED: ABNORMAL
DATE OF COLLECTION: ABNORMAL
ECHO AO ASC DIAM: 3.2 CM
ECHO AO ASCENDING AORTA INDEX: 1.25 CM/M2
ECHO AV CUSP MM: 2.3 CM
ECHO AV MEAN GRADIENT: 16 MMHG
ECHO AV MEAN VELOCITY: 1.9 M/S
ECHO AV PEAK GRADIENT: 27 MMHG
ECHO AV PEAK VELOCITY: 2.6 M/S
ECHO AV VTI: 30 CM
ECHO BSA: 2.65 M2
ECHO EST RA PRESSURE: 3 MMHG
ECHO LA DIAMETER INDEX: 1.09 CM/M2
ECHO LA DIAMETER: 2.8 CM
ECHO LV EF PHYSICIAN: 60 %
ECHO LV FRACTIONAL SHORTENING: 34 % (ref 28–44)
ECHO LV INTERNAL DIMENSION DIASTOLE INDEX: 1.37 CM/M2
ECHO LV INTERNAL DIMENSION DIASTOLIC: 3.5 CM (ref 4.2–5.9)
ECHO LV INTERNAL DIMENSION SYSTOLIC INDEX: 0.9 CM/M2
ECHO LV INTERNAL DIMENSION SYSTOLIC: 2.3 CM
ECHO LV IVSD: 1.1 CM (ref 0.6–1)
ECHO LV IVSS: 1.3 CM
ECHO LV MASS 2D: 119 G (ref 88–224)
ECHO LV MASS INDEX 2D: 46.5 G/M2 (ref 49–115)
ECHO LV POSTERIOR WALL DIASTOLIC: 1.1 CM (ref 0.6–1)
ECHO LV POSTERIOR WALL SYSTOLIC: 1.3 CM
ECHO LV RELATIVE WALL THICKNESS RATIO: 0.63
ECHO LVOT AREA: 3.1 CM2
ECHO LVOT DIAM: 2 CM
ECHO MV "A" WAVE DURATION: 83.7 MSEC
ECHO MV A VELOCITY: 0.89 M/S
ECHO MV AREA PHT: 4.8 CM2
ECHO MV E DECELERATION TIME (DT): 140.2 MS
ECHO MV E VELOCITY: 0.62 M/S
ECHO MV E/A RATIO: 0.7
ECHO MV MAX VELOCITY: 1.6 M/S
ECHO MV MEAN GRADIENT: 4 MMHG
ECHO MV MEAN VELOCITY: 0.9 M/S
ECHO MV PEAK GRADIENT: 10 MMHG
ECHO MV PRESSURE HALF TIME (PHT): 46.2 MS
ECHO MV VTI: 17.8 CM
ECHO PULMONARY ARTERY END DIASTOLIC PRESSURE: 9 MMHG
ECHO PV MAX VELOCITY: 1.6 M/S
ECHO PV MEAN GRADIENT: 5 MMHG
ECHO PV MEAN VELOCITY: 1.1 M/S
ECHO PV PEAK GRADIENT: 10 MMHG
ECHO PV REGURGITANT MAX VELOCITY: 1.5 M/S
ECHO PV VTI: 19.2 CM
ECHO RIGHT VENTRICULAR SYSTOLIC PRESSURE (RVSP): 61 MMHG
ECHO RV INTERNAL DIMENSION: 3.5 CM
ECHO TV REGURGITANT MAX VELOCITY: 3.82 M/S
ECHO TV REGURGITANT PEAK GRADIENT: 58 MMHG
EKG ATRIAL RATE: 126 BPM
EKG ATRIAL RATE: 128 BPM
EKG P AXIS: 54 DEGREES
EKG P AXIS: 86 DEGREES
EKG P-R INTERVAL: 148 MS
EKG P-R INTERVAL: 150 MS
EKG Q-T INTERVAL: 318 MS
EKG Q-T INTERVAL: 324 MS
EKG QRS DURATION: 70 MS
EKG QRS DURATION: 94 MS
EKG QTC CALCULATION (BAZETT): 464 MS
EKG QTC CALCULATION (BAZETT): 469 MS
EKG R AXIS: 1 DEGREES
EKG R AXIS: 63 DEGREES
EKG T AXIS: 45 DEGREES
EKG T AXIS: 7 DEGREES
EKG VENTRICULAR RATE: 126 BPM
EKG VENTRICULAR RATE: 128 BPM
EOSINOPHIL # BLD: 0 K/UL (ref 0.05–0.5)
EOSINOPHIL # BLD: 0.01 K/UL (ref 0.05–0.5)
EOSINOPHILS RELATIVE PERCENT: 0 % (ref 0–6)
EPL-TEG: 0 % (ref 0–15)
EPL-TEG: 0 % (ref 0–15)
ERYTHROCYTE [DISTWIDTH] IN BLOOD BY AUTOMATED COUNT: 15.1 % (ref 11.5–15)
ERYTHROCYTE [DISTWIDTH] IN BLOOD BY AUTOMATED COUNT: 15.4 % (ref 11.5–15)
ERYTHROCYTE [DISTWIDTH] IN BLOOD BY AUTOMATED COUNT: 15.7 % (ref 11.5–15)
ERYTHROCYTE [DISTWIDTH] IN BLOOD BY AUTOMATED COUNT: 15.8 % (ref 11.5–15)
ERYTHROCYTE [DISTWIDTH] IN BLOOD BY AUTOMATED COUNT: 15.8 % (ref 11.5–15)
FIO2: 100 %
FIO2: 80 %
G-TEG: 3.9 KDYN/CM2 (ref 4.5–11)
G-TEG: 5.4 KDYN/CM2 (ref 4.5–11)
GFR, ESTIMATED: 36 ML/MIN/1.73M2
GFR, ESTIMATED: 36 ML/MIN/1.73M2
GFR, ESTIMATED: 38 ML/MIN/1.73M2
GFR, ESTIMATED: 42 ML/MIN/1.73M2
GFR, ESTIMATED: 46 ML/MIN/1.73M2
GLUCOSE BLD-MCNC: 181 MG/DL (ref 74–99)
GLUCOSE BLD-MCNC: 207 MG/DL (ref 74–99)
GLUCOSE BLD-MCNC: 208 MG/DL (ref 74–99)
GLUCOSE BLD-MCNC: 212 MG/DL (ref 74–99)
GLUCOSE BLD-MCNC: 217 MG/DL (ref 74–99)
GLUCOSE BLD-MCNC: 219 MG/DL (ref 74–99)
GLUCOSE SERPL-MCNC: 186 MG/DL (ref 74–99)
GLUCOSE SERPL-MCNC: 202 MG/DL (ref 74–99)
GLUCOSE SERPL-MCNC: 207 MG/DL (ref 74–99)
GLUCOSE SERPL-MCNC: 208 MG/DL (ref 74–99)
GLUCOSE SERPL-MCNC: 224 MG/DL (ref 74–99)
HCO3: 12.3 MMOL/L (ref 22–26)
HCO3: 15.3 MMOL/L (ref 22–26)
HCO3: 16.4 MMOL/L (ref 22–26)
HCO3: 18.2 MMOL/L (ref 22–26)
HCO3: 19.6 MMOL/L (ref 22–26)
HCO3: 21.5 MMOL/L (ref 22–26)
HCT VFR BLD AUTO: 24.3 % (ref 37–54)
HCT VFR BLD AUTO: 26.4 % (ref 37–54)
HCT VFR BLD AUTO: 28.6 % (ref 37–54)
HCT VFR BLD AUTO: 31.2 % (ref 37–54)
HCT VFR BLD AUTO: 31.3 % (ref 37–54)
HGB BLD-MCNC: 10 G/DL (ref 12.5–16.5)
HGB BLD-MCNC: 10.3 G/DL (ref 12.5–16.5)
HGB BLD-MCNC: 7.5 G/DL (ref 12.5–16.5)
HGB BLD-MCNC: 8.9 G/DL (ref 12.5–16.5)
HGB BLD-MCNC: 9.6 G/DL (ref 12.5–16.5)
HHB: 0.9 % (ref 0–5)
HHB: 1.1 % (ref 0–5)
HHB: 11.4 % (ref 0–5)
HHB: 15.4 % (ref 0–5)
HHB: 2 % (ref 0–5)
HHB: 2.5 % (ref 0–5)
IMM GRANULOCYTES # BLD AUTO: 0.04 K/UL (ref 0–0.58)
IMM GRANULOCYTES # BLD AUTO: 0.05 K/UL (ref 0–0.58)
IMM GRANULOCYTES # BLD AUTO: 0.08 K/UL (ref 0–0.58)
IMM GRANULOCYTES # BLD AUTO: 0.1 K/UL (ref 0–0.58)
IMM GRANULOCYTES # BLD AUTO: 0.11 K/UL (ref 0–0.58)
IMM GRANULOCYTES NFR BLD: 0 % (ref 0–5)
IMM GRANULOCYTES NFR BLD: 1 % (ref 0–5)
KINETICS TEG: 2.5 MIN (ref 1–3)
KINETICS TEG: 4.9 MIN (ref 1–3)
LAB: ABNORMAL
LACTATE BLDV-SCNC: 11.1 MMOL/L (ref 0.5–2.2)
LACTATE BLDV-SCNC: 3 MMOL/L (ref 0.5–2.2)
LACTATE BLDV-SCNC: 4.2 MMOL/L (ref 0.5–2.2)
LACTATE BLDV-SCNC: 5.9 MMOL/L (ref 0.5–2.2)
LACTATE BLDV-SCNC: 8 MMOL/L (ref 0.5–2.2)
LY30 (LYSIS) TEG: 0 % (ref 0–8)
LY30 (LYSIS) TEG: 0 % (ref 0–8)
LYMPHOCYTES NFR BLD: 0.42 K/UL (ref 1.5–4)
LYMPHOCYTES NFR BLD: 0.57 K/UL (ref 1.5–4)
LYMPHOCYTES NFR BLD: 0.74 K/UL (ref 1.5–4)
LYMPHOCYTES NFR BLD: 1.23 K/UL (ref 1.5–4)
LYMPHOCYTES NFR BLD: 1.57 K/UL (ref 1.5–4)
LYMPHOCYTES RELATIVE PERCENT: 10 % (ref 20–42)
LYMPHOCYTES RELATIVE PERCENT: 10 % (ref 20–42)
LYMPHOCYTES RELATIVE PERCENT: 5 % (ref 20–42)
LYMPHOCYTES RELATIVE PERCENT: 5 % (ref 20–42)
LYMPHOCYTES RELATIVE PERCENT: 8 % (ref 20–42)
Lab: 115
Lab: 1235
Lab: 1805
Lab: 2235
Lab: 235
Lab: 751
MA (MAX CLOT) TEG: 43.9 MM (ref 50–70)
MA (MAX CLOT) TEG: 52 MM (ref 50–70)
MAGNESIUM SERPL-MCNC: 1.6 MG/DL (ref 1.6–2.6)
MAGNESIUM SERPL-MCNC: 1.8 MG/DL (ref 1.6–2.6)
MAGNESIUM SERPL-MCNC: 2 MG/DL (ref 1.6–2.6)
MCH RBC QN AUTO: 29 PG (ref 26–35)
MCH RBC QN AUTO: 29 PG (ref 26–35)
MCH RBC QN AUTO: 29.1 PG (ref 26–35)
MCH RBC QN AUTO: 29.1 PG (ref 26–35)
MCH RBC QN AUTO: 29.3 PG (ref 26–35)
MCHC RBC AUTO-ENTMCNC: 30.9 G/DL (ref 32–34.5)
MCHC RBC AUTO-ENTMCNC: 32.1 G/DL (ref 32–34.5)
MCHC RBC AUTO-ENTMCNC: 32.9 G/DL (ref 32–34.5)
MCHC RBC AUTO-ENTMCNC: 33.6 G/DL (ref 32–34.5)
MCHC RBC AUTO-ENTMCNC: 33.7 G/DL (ref 32–34.5)
MCV RBC AUTO: 86 FL (ref 80–99.9)
MCV RBC AUTO: 86.4 FL (ref 80–99.9)
MCV RBC AUTO: 88.9 FL (ref 80–99.9)
MCV RBC AUTO: 90.7 FL (ref 80–99.9)
MCV RBC AUTO: 94.2 FL (ref 80–99.9)
METHB: 0 % (ref 0–1.5)
METHB: 0.1 % (ref 0–1.5)
METHB: 0.3 % (ref 0–1.5)
METHB: 0.3 % (ref 0–1.5)
METHB: 1.1 % (ref 0–1.5)
METHB: 2.9 % (ref 0–1.5)
MODE: AC
MONOCYTES NFR BLD: 0.46 K/UL (ref 0.1–0.95)
MONOCYTES NFR BLD: 0.51 K/UL (ref 0.1–0.95)
MONOCYTES NFR BLD: 0.87 K/UL (ref 0.1–0.95)
MONOCYTES NFR BLD: 0.93 K/UL (ref 0.1–0.95)
MONOCYTES NFR BLD: 1.22 K/UL (ref 0.1–0.95)
MONOCYTES NFR BLD: 6 % (ref 2–12)
MONOCYTES NFR BLD: 6 % (ref 2–12)
MONOCYTES NFR BLD: 8 % (ref 2–12)
NEUTROPHILS NFR BLD: 82 % (ref 43–80)
NEUTROPHILS NFR BLD: 82 % (ref 43–80)
NEUTROPHILS NFR BLD: 85 % (ref 43–80)
NEUTROPHILS NFR BLD: 86 % (ref 43–80)
NEUTROPHILS NFR BLD: 88 % (ref 43–80)
NEUTS SEG NFR BLD: 10.02 K/UL (ref 1.8–7.3)
NEUTS SEG NFR BLD: 12.93 K/UL (ref 1.8–7.3)
NEUTS SEG NFR BLD: 6.93 K/UL (ref 1.8–7.3)
NEUTS SEG NFR BLD: 7.74 K/UL (ref 1.8–7.3)
NEUTS SEG NFR BLD: 9.66 K/UL (ref 1.8–7.3)
O2 SATURATION: 84.1 % (ref 92–98.5)
O2 SATURATION: 88.4 % (ref 92–98.5)
O2 SATURATION: 97.5 % (ref 92–98.5)
O2 SATURATION: 98 % (ref 92–98.5)
O2 SATURATION: 98.9 % (ref 92–98.5)
O2 SATURATION: 99.1 % (ref 92–98.5)
O2HB: 81.5 % (ref 94–97)
O2HB: 86.8 % (ref 94–97)
O2HB: 97.2 % (ref 94–97)
O2HB: 97.6 % (ref 94–97)
O2HB: 98.3 % (ref 94–97)
O2HB: 98.6 % (ref 94–97)
OPERATOR ID: 421
OPERATOR ID: 7291
OPERATOR ID: 7291
OPERATOR ID: ABNORMAL
OSMOLALITY UR: 434 MOSM/KG (ref 300–900)
PATIENT TEMP: 37 C
PCO2: 28.7 MMHG (ref 35–45)
PCO2: 30.2 MMHG (ref 35–45)
PCO2: 30.6 MMHG (ref 35–45)
PCO2: 31.3 MMHG (ref 35–45)
PCO2: 41.7 MMHG (ref 35–45)
PCO2: 46.5 MMHG (ref 35–45)
PEEP/CPAP: 10 CMH2O
PFO2: 0.53 MMHG/%
PFO2: 0.55 MMHG/%
PFO2: 1.41 MMHG/%
PFO2: 1.46 MMHG/%
PFO2: 1.61 MMHG/%
PFO2: 2.26 MMHG/%
PH BLOOD GAS: 7.04 (ref 7.35–7.45)
PH BLOOD GAS: 7.18 (ref 7.35–7.45)
PH BLOOD GAS: 7.35 (ref 7.35–7.45)
PH BLOOD GAS: 7.4 (ref 7.35–7.45)
PH BLOOD GAS: 7.45 (ref 7.35–7.45)
PH BLOOD GAS: 7.46 (ref 7.35–7.45)
PHOSPHATE SERPL-MCNC: 3.5 MG/DL (ref 2.5–4.5)
PHOSPHATE SERPL-MCNC: 5.8 MG/DL (ref 2.5–4.5)
PHOSPHATE SERPL-MCNC: 5.8 MG/DL (ref 2.5–4.5)
PLATELET # BLD AUTO: 50 K/UL (ref 130–450)
PLATELET # BLD AUTO: 57 K/UL (ref 130–450)
PLATELET # BLD AUTO: 62 K/UL (ref 130–450)
PLATELET CONFIRMATION: NORMAL
PLATELET, FLUORESCENCE: 47 K/UL (ref 130–450)
PLATELET, FLUORESCENCE: 50 K/UL (ref 130–450)
PMV BLD AUTO: 10.9 FL (ref 7–12)
PMV BLD AUTO: 11.6 FL (ref 7–12)
PMV BLD AUTO: 11.7 FL (ref 7–12)
PMV BLD AUTO: 9.2 FL (ref 7–12)
PMV BLD AUTO: 9.4 FL (ref 7–12)
PO2: 116.5 MMHG (ref 75–100)
PO2: 141.5 MMHG (ref 75–100)
PO2: 160.9 MMHG (ref 75–100)
PO2: 226.3 MMHG (ref 75–100)
PO2: 53.4 MMHG (ref 75–100)
PO2: 54.8 MMHG (ref 75–100)
POTASSIUM SERPL-SCNC: 4.9 MMOL/L (ref 3.5–5)
POTASSIUM SERPL-SCNC: 4.9 MMOL/L (ref 3.5–5)
POTASSIUM SERPL-SCNC: 5.1 MMOL/L (ref 3.5–5)
POTASSIUM SERPL-SCNC: 5.4 MMOL/L (ref 3.5–5)
POTASSIUM SERPL-SCNC: 5.8 MMOL/L (ref 3.5–5)
PROT SERPL-MCNC: 3.1 G/DL (ref 6.4–8.3)
PROT SERPL-MCNC: 3.9 G/DL (ref 6.4–8.3)
PROT SERPL-MCNC: 4.1 G/DL (ref 6.4–8.3)
RBC # BLD AUTO: 2.58 M/UL (ref 3.8–5.8)
RBC # BLD AUTO: 3.07 M/UL (ref 3.8–5.8)
RBC # BLD AUTO: 3.31 M/UL (ref 3.8–5.8)
RBC # BLD AUTO: 3.44 M/UL (ref 3.8–5.8)
RBC # BLD AUTO: 3.52 M/UL (ref 3.8–5.8)
RBC # BLD: ABNORMAL 10*6/UL
REACTION TIME TEG: 6.5 MIN (ref 5–10)
REACTION TIME TEG: 8.8 MIN (ref 5–10)
RI(T): 11.25
RI(T): 11.48
RI(T): 2.02
RI(T): 3.24
RI(T): 3.64
RI(T): 3.82
RR MECHANICAL: 16 B/MIN
RR MECHANICAL: 20 B/MIN
SODIUM SERPL-SCNC: 141 MMOL/L (ref 132–146)
SODIUM SERPL-SCNC: 141 MMOL/L (ref 132–146)
SODIUM SERPL-SCNC: 142 MMOL/L (ref 132–146)
SODIUM SERPL-SCNC: 142 MMOL/L (ref 132–146)
SODIUM SERPL-SCNC: 144 MMOL/L (ref 132–146)
SODIUM UR-SCNC: 56 MMOL/L
SOURCE, BLOOD GAS: ABNORMAL
THB: 10.7 G/DL (ref 11.5–16.5)
THB: 10.7 G/DL (ref 11.5–16.5)
THB: 11.3 G/DL (ref 11.5–16.5)
THB: 11.4 G/DL (ref 11.5–16.5)
THB: 9.3 G/DL (ref 11.5–16.5)
THB: 9.7 G/DL (ref 11.5–16.5)
TIME ANALYZED: 118
TIME ANALYZED: 1235
TIME ANALYZED: 1809
TIME ANALYZED: 2238
TIME ANALYZED: 243
TIME ANALYZED: 756
TOTAL PROTEIN, URINE: 42 MG/DL (ref 0–12)
TRANSFUSION STATUS: NORMAL
TROPONIN I SERPL HS-MCNC: 155 NG/L (ref 0–11)
TROPONIN I SERPL HS-MCNC: 211 NG/L (ref 0–11)
UNIT DIVISION: 0
UNIT ISSUE DATE/TIME: NORMAL
URINE TOTAL PROTEIN CREATININE RATIO: 0.45 (ref 0–0.2)
VT MECHANICAL: 500 ML
WBC OTHER # BLD: 11.2 K/UL (ref 4.5–11.5)
WBC OTHER # BLD: 12.2 K/UL (ref 4.5–11.5)
WBC OTHER # BLD: 15.8 K/UL (ref 4.5–11.5)
WBC OTHER # BLD: 7.9 K/UL (ref 4.5–11.5)
WBC OTHER # BLD: 9.1 K/UL (ref 4.5–11.5)

## 2024-10-25 PROCEDURE — 85576 BLOOD PLATELET AGGREGATION: CPT

## 2024-10-25 PROCEDURE — 85347 COAGULATION TIME ACTIVATED: CPT

## 2024-10-25 PROCEDURE — 31500 INSERT EMERGENCY AIRWAY: CPT | Performed by: STUDENT IN AN ORGANIZED HEALTH CARE EDUCATION/TRAINING PROGRAM

## 2024-10-25 PROCEDURE — 83735 ASSAY OF MAGNESIUM: CPT

## 2024-10-25 PROCEDURE — 71045 X-RAY EXAM CHEST 1 VIEW: CPT

## 2024-10-25 PROCEDURE — 93306 TTE W/DOPPLER COMPLETE: CPT | Performed by: INTERNAL MEDICINE

## 2024-10-25 PROCEDURE — 6370000000 HC RX 637 (ALT 250 FOR IP)

## 2024-10-25 PROCEDURE — 82570 ASSAY OF URINE CREATININE: CPT

## 2024-10-25 PROCEDURE — 2000000000 HC ICU R&B

## 2024-10-25 PROCEDURE — 85390 FIBRINOLYSINS SCREEN I&R: CPT

## 2024-10-25 PROCEDURE — 99291 CRITICAL CARE FIRST HOUR: CPT | Performed by: SURGERY

## 2024-10-25 PROCEDURE — 32551 INSERTION OF CHEST TUBE: CPT | Performed by: STUDENT IN AN ORGANIZED HEALTH CARE EDUCATION/TRAINING PROGRAM

## 2024-10-25 PROCEDURE — 82436 ASSAY OF URINE CHLORIDE: CPT

## 2024-10-25 PROCEDURE — 85384 FIBRINOGEN ACTIVITY: CPT

## 2024-10-25 PROCEDURE — 37799 UNLISTED PX VASCULAR SURGERY: CPT

## 2024-10-25 PROCEDURE — 85025 COMPLETE CBC W/AUTO DIFF WBC: CPT

## 2024-10-25 PROCEDURE — 92950 HEART/LUNG RESUSCITATION CPR: CPT

## 2024-10-25 PROCEDURE — 6360000002 HC RX W HCPCS

## 2024-10-25 PROCEDURE — 93010 ELECTROCARDIOGRAM REPORT: CPT | Performed by: INTERNAL MEDICINE

## 2024-10-25 PROCEDURE — P9017 PLASMA 1 DONOR FRZ W/IN 8 HR: HCPCS

## 2024-10-25 PROCEDURE — 80048 BASIC METABOLIC PNL TOTAL CA: CPT

## 2024-10-25 PROCEDURE — 2580000003 HC RX 258

## 2024-10-25 PROCEDURE — 2500000003 HC RX 250 WO HCPCS

## 2024-10-25 PROCEDURE — 84443 ASSAY THYROID STIM HORMONE: CPT

## 2024-10-25 PROCEDURE — 2500000003 HC RX 250 WO HCPCS: Performed by: STUDENT IN AN ORGANIZED HEALTH CARE EDUCATION/TRAINING PROGRAM

## 2024-10-25 PROCEDURE — 82805 BLOOD GASES W/O2 SATURATION: CPT

## 2024-10-25 PROCEDURE — 83935 ASSAY OF URINE OSMOLALITY: CPT

## 2024-10-25 PROCEDURE — 84100 ASSAY OF PHOSPHORUS: CPT

## 2024-10-25 PROCEDURE — 93306 TTE W/DOPPLER COMPLETE: CPT

## 2024-10-25 PROCEDURE — P9016 RBC LEUKOCYTES REDUCED: HCPCS

## 2024-10-25 PROCEDURE — 0W9B30Z DRAINAGE OF LEFT PLEURAL CAVITY WITH DRAINAGE DEVICE, PERCUTANEOUS APPROACH: ICD-10-PCS

## 2024-10-25 PROCEDURE — 84300 ASSAY OF URINE SODIUM: CPT

## 2024-10-25 PROCEDURE — 82330 ASSAY OF CALCIUM: CPT

## 2024-10-25 PROCEDURE — 36620 INSERTION CATHETER ARTERY: CPT | Performed by: STUDENT IN AN ORGANIZED HEALTH CARE EDUCATION/TRAINING PROGRAM

## 2024-10-25 PROCEDURE — 83605 ASSAY OF LACTIC ACID: CPT

## 2024-10-25 PROCEDURE — 94003 VENT MGMT INPAT SUBQ DAY: CPT

## 2024-10-25 PROCEDURE — 99223 1ST HOSP IP/OBS HIGH 75: CPT | Performed by: STUDENT IN AN ORGANIZED HEALTH CARE EDUCATION/TRAINING PROGRAM

## 2024-10-25 PROCEDURE — 84156 ASSAY OF PROTEIN URINE: CPT

## 2024-10-25 PROCEDURE — 82550 ASSAY OF CK (CPK): CPT

## 2024-10-25 PROCEDURE — 5A12012 PERFORMANCE OF CARDIAC OUTPUT, SINGLE, MANUAL: ICD-10-PCS | Performed by: INTERNAL MEDICINE

## 2024-10-25 PROCEDURE — APPSS60 APP SPLIT SHARED TIME 46-60 MINUTES

## 2024-10-25 PROCEDURE — 86927 PLASMA FRESH FROZEN: CPT

## 2024-10-25 PROCEDURE — 80053 COMPREHEN METABOLIC PANEL: CPT

## 2024-10-25 PROCEDURE — 93005 ELECTROCARDIOGRAM TRACING: CPT

## 2024-10-25 PROCEDURE — 82533 TOTAL CORTISOL: CPT

## 2024-10-25 PROCEDURE — 80074 ACUTE HEPATITIS PANEL: CPT

## 2024-10-25 PROCEDURE — 84484 ASSAY OF TROPONIN QUANT: CPT

## 2024-10-25 PROCEDURE — 86317 IMMUNOASSAY INFECTIOUS AGENT: CPT

## 2024-10-25 PROCEDURE — 6360000002 HC RX W HCPCS: Performed by: STUDENT IN AN ORGANIZED HEALTH CARE EDUCATION/TRAINING PROGRAM

## 2024-10-25 PROCEDURE — P9012 CRYOPRECIPITATE EACH UNIT: HCPCS

## 2024-10-25 PROCEDURE — 99222 1ST HOSP IP/OBS MODERATE 55: CPT | Performed by: INTERNAL MEDICINE

## 2024-10-25 PROCEDURE — 82962 GLUCOSE BLOOD TEST: CPT

## 2024-10-25 RX ORDER — SODIUM CHLORIDE, SODIUM LACTATE, POTASSIUM CHLORIDE, AND CALCIUM CHLORIDE .6; .31; .03; .02 G/100ML; G/100ML; G/100ML; G/100ML
1000 INJECTION, SOLUTION INTRAVENOUS ONCE
Status: COMPLETED | OUTPATIENT
Start: 2024-10-25 | End: 2024-10-25

## 2024-10-25 RX ORDER — PROPOFOL 10 MG/ML
5-50 INJECTION, EMULSION INTRAVENOUS CONTINUOUS
Status: DISCONTINUED | OUTPATIENT
Start: 2024-10-26 | End: 2024-11-05

## 2024-10-25 RX ORDER — MAGNESIUM SULFATE IN WATER 40 MG/ML
2000 INJECTION, SOLUTION INTRAVENOUS ONCE
Status: COMPLETED | OUTPATIENT
Start: 2024-10-25 | End: 2024-10-25

## 2024-10-25 RX ORDER — EPINEPHRINE IN SOD CHLOR,ISO 1 MG/10 ML
SYRINGE (ML) INTRAVENOUS
Status: COMPLETED | OUTPATIENT
Start: 2024-10-25 | End: 2024-10-25

## 2024-10-25 RX ORDER — SODIUM CHLORIDE, SODIUM LACTATE, POTASSIUM CHLORIDE, AND CALCIUM CHLORIDE .6; .31; .03; .02 G/100ML; G/100ML; G/100ML; G/100ML
1000 INJECTION, SOLUTION INTRAVENOUS ONCE
Status: COMPLETED | OUTPATIENT
Start: 2024-10-26 | End: 2024-10-26

## 2024-10-25 RX ORDER — CALCIUM GLUCONATE 20 MG/ML
1000 INJECTION, SOLUTION INTRAVENOUS ONCE
Status: COMPLETED | OUTPATIENT
Start: 2024-10-25 | End: 2024-10-25

## 2024-10-25 RX ORDER — VECURONIUM BROMIDE 1 MG/ML
INJECTION, POWDER, LYOPHILIZED, FOR SOLUTION INTRAVENOUS
Status: DISPENSED
Start: 2024-10-25 | End: 2024-10-25

## 2024-10-25 RX ORDER — SODIUM CHLORIDE, SODIUM LACTATE, POTASSIUM CHLORIDE, AND CALCIUM CHLORIDE .6; .31; .03; .02 G/100ML; G/100ML; G/100ML; G/100ML
1000 INJECTION, SOLUTION INTRAVENOUS ONCE
Status: DISCONTINUED | OUTPATIENT
Start: 2024-10-25 | End: 2024-11-01

## 2024-10-25 RX ORDER — SODIUM CHLORIDE 9 MG/ML
INJECTION, SOLUTION INTRAVENOUS PRN
Status: DISCONTINUED | OUTPATIENT
Start: 2024-10-25 | End: 2024-10-25

## 2024-10-25 RX ORDER — HYDROCORTISONE SODIUM SUCCINATE 100 MG/2ML
50 INJECTION INTRAMUSCULAR; INTRAVENOUS EVERY 6 HOURS
Status: DISCONTINUED | OUTPATIENT
Start: 2024-10-26 | End: 2024-11-06

## 2024-10-25 RX ORDER — INSULIN LISPRO 100 [IU]/ML
0-4 INJECTION, SOLUTION INTRAVENOUS; SUBCUTANEOUS EVERY 4 HOURS
Status: DISCONTINUED | OUTPATIENT
Start: 2024-10-25 | End: 2024-10-31

## 2024-10-25 RX ORDER — MAGNESIUM SULFATE IN WATER 40 MG/ML
4000 INJECTION, SOLUTION INTRAVENOUS ONCE
Status: COMPLETED | OUTPATIENT
Start: 2024-10-25 | End: 2024-10-25

## 2024-10-25 RX ORDER — SODIUM CHLORIDE 9 MG/ML
INJECTION, SOLUTION INTRAVENOUS PRN
Status: DISCONTINUED | OUTPATIENT
Start: 2024-10-25 | End: 2024-11-02

## 2024-10-25 RX ORDER — PROPOFOL 10 MG/ML
5-50 INJECTION, EMULSION INTRAVENOUS CONTINUOUS
Status: DISCONTINUED | OUTPATIENT
Start: 2024-10-25 | End: 2024-10-25

## 2024-10-25 RX ORDER — VECURONIUM BROMIDE 1 MG/ML
10 INJECTION, POWDER, LYOPHILIZED, FOR SOLUTION INTRAVENOUS ONCE
Status: COMPLETED | OUTPATIENT
Start: 2024-10-25 | End: 2024-10-25

## 2024-10-25 RX ORDER — DEXTROSE MONOHYDRATE 100 MG/ML
INJECTION, SOLUTION INTRAVENOUS CONTINUOUS PRN
Status: DISCONTINUED | OUTPATIENT
Start: 2024-10-25 | End: 2024-11-14 | Stop reason: HOSPADM

## 2024-10-25 RX ORDER — GLUCAGON 1 MG/ML
1 KIT INJECTION PRN
Status: DISCONTINUED | OUTPATIENT
Start: 2024-10-25 | End: 2024-11-14 | Stop reason: HOSPADM

## 2024-10-25 RX ADMIN — MINERAL OIL, WHITE PETROLATUM: .03; .94 OINTMENT OPHTHALMIC at 20:16

## 2024-10-25 RX ADMIN — SODIUM CHLORIDE, PRESERVATIVE FREE 20 MG: 5 INJECTION INTRAVENOUS at 08:21

## 2024-10-25 RX ADMIN — PROPOFOL 35 MCG/KG/MIN: 10 INJECTION, EMULSION INTRAVENOUS at 20:45

## 2024-10-25 RX ADMIN — SODIUM CHLORIDE, SODIUM LACTATE, POTASSIUM CHLORIDE, AND CALCIUM CHLORIDE 1000 ML: .6; .31; .03; .02 INJECTION, SOLUTION INTRAVENOUS at 19:02

## 2024-10-25 RX ADMIN — Medication 1 MG: at 00:36

## 2024-10-25 RX ADMIN — SODIUM CHLORIDE, POTASSIUM CHLORIDE, SODIUM LACTATE AND CALCIUM CHLORIDE: 600; 310; 30; 20 INJECTION, SOLUTION INTRAVENOUS at 20:11

## 2024-10-25 RX ADMIN — Medication 125 MCG/HR: at 22:54

## 2024-10-25 RX ADMIN — INSULIN LISPRO 1 UNITS: 100 INJECTION, SOLUTION INTRAVENOUS; SUBCUTANEOUS at 14:19

## 2024-10-25 RX ADMIN — SODIUM CHLORIDE, POTASSIUM CHLORIDE, SODIUM LACTATE AND CALCIUM CHLORIDE 1000 ML: 600; 310; 30; 20 INJECTION, SOLUTION INTRAVENOUS at 23:57

## 2024-10-25 RX ADMIN — CALCIUM GLUCONATE 1000 MG: 20 INJECTION, SOLUTION INTRAVENOUS at 02:05

## 2024-10-25 RX ADMIN — VASOPRESSIN 0.04 UNITS/MIN: 20 INJECTION INTRAVENOUS at 14:10

## 2024-10-25 RX ADMIN — PROPOFOL 35 MCG/KG/MIN: 10 INJECTION, EMULSION INTRAVENOUS at 23:52

## 2024-10-25 RX ADMIN — SODIUM CHLORIDE: 9 INJECTION, SOLUTION INTRAVENOUS at 20:27

## 2024-10-25 RX ADMIN — INSULIN LISPRO 1 UNITS: 100 INJECTION, SOLUTION INTRAVENOUS; SUBCUTANEOUS at 18:10

## 2024-10-25 RX ADMIN — Medication 1 MG: at 00:34

## 2024-10-25 RX ADMIN — METHOCARBAMOL 1000 MG: 500 TABLET ORAL at 14:06

## 2024-10-25 RX ADMIN — Medication 20 MCG/MIN: at 10:19

## 2024-10-25 RX ADMIN — SODIUM CHLORIDE, PRESERVATIVE FREE 10 ML: 5 INJECTION INTRAVENOUS at 20:16

## 2024-10-25 RX ADMIN — DESMOPRESSIN ACETATE 54 MCG: 4 INJECTION, SOLUTION INTRAVENOUS; SUBCUTANEOUS at 00:16

## 2024-10-25 RX ADMIN — SODIUM CHLORIDE, PRESERVATIVE FREE 10 ML: 5 INJECTION INTRAVENOUS at 08:02

## 2024-10-25 RX ADMIN — CALCIUM GLUCONATE 4000 MG: 98 INJECTION, SOLUTION INTRAVENOUS at 20:43

## 2024-10-25 RX ADMIN — CALCIUM GLUCONATE 1000 MG: 20 INJECTION, SOLUTION INTRAVENOUS at 03:53

## 2024-10-25 RX ADMIN — DEXTROSE MONOHYDRATE 250 ML: 100 INJECTION, SOLUTION INTRAVENOUS at 14:36

## 2024-10-25 RX ADMIN — SODIUM CHLORIDE, PRESERVATIVE FREE 20 MG: 5 INJECTION INTRAVENOUS at 20:29

## 2024-10-25 RX ADMIN — SODIUM BICARBONATE 50 MEQ: 84 INJECTION INTRAVENOUS at 08:21

## 2024-10-25 RX ADMIN — CALCIUM GLUCONATE 1000 MG: 20 INJECTION, SOLUTION INTRAVENOUS at 02:55

## 2024-10-25 RX ADMIN — SODIUM BICARBONATE 50 MEQ: 84 INJECTION, SOLUTION INTRAVENOUS at 00:39

## 2024-10-25 RX ADMIN — VECURONIUM BROMIDE 10 MG: 1 INJECTION, POWDER, LYOPHILIZED, FOR SOLUTION INTRAVENOUS at 01:34

## 2024-10-25 RX ADMIN — MINERAL OIL, WHITE PETROLATUM: .03; .94 OINTMENT OPHTHALMIC at 14:19

## 2024-10-25 RX ADMIN — CALCIUM GLUCONATE 1000 MG: 20 INJECTION, SOLUTION INTRAVENOUS at 14:40

## 2024-10-25 RX ADMIN — PROPOFOL 20 MCG/KG/MIN: 10 INJECTION, EMULSION INTRAVENOUS at 09:58

## 2024-10-25 RX ADMIN — Medication 1 MG: at 00:28

## 2024-10-25 RX ADMIN — SODIUM CHLORIDE, POTASSIUM CHLORIDE, SODIUM LACTATE AND CALCIUM CHLORIDE 1000 ML: 600; 310; 30; 20 INJECTION, SOLUTION INTRAVENOUS at 14:06

## 2024-10-25 RX ADMIN — Medication 125 MCG/HR: at 14:09

## 2024-10-25 RX ADMIN — CHLORHEXIDINE GLUCONATE, 0.12% ORAL RINSE 15 ML: 1.2 SOLUTION DENTAL at 08:02

## 2024-10-25 RX ADMIN — INSULIN HUMAN 10 UNITS: 100 INJECTION, SOLUTION PARENTERAL at 08:21

## 2024-10-25 RX ADMIN — SODIUM CHLORIDE, POTASSIUM CHLORIDE, SODIUM LACTATE AND CALCIUM CHLORIDE: 600; 310; 30; 20 INJECTION, SOLUTION INTRAVENOUS at 13:39

## 2024-10-25 RX ADMIN — Medication 1 MG: at 00:32

## 2024-10-25 RX ADMIN — MAGNESIUM SULFATE HEPTAHYDRATE 4000 MG: 40 INJECTION, SOLUTION INTRAVENOUS at 01:25

## 2024-10-25 RX ADMIN — INSULIN HUMAN 10 UNITS: 100 INJECTION, SOLUTION PARENTERAL at 14:29

## 2024-10-25 RX ADMIN — CHLORHEXIDINE GLUCONATE, 0.12% ORAL RINSE 15 ML: 1.2 SOLUTION DENTAL at 20:16

## 2024-10-25 RX ADMIN — DEXTROSE MONOHYDRATE 250 ML: 100 INJECTION, SOLUTION INTRAVENOUS at 08:27

## 2024-10-25 RX ADMIN — PROPOFOL 35 MCG/KG/MIN: 10 INJECTION, EMULSION INTRAVENOUS at 23:47

## 2024-10-25 RX ADMIN — CALCIUM GLUCONATE 1000 MG: 20 INJECTION, SOLUTION INTRAVENOUS at 01:28

## 2024-10-25 RX ADMIN — Medication 50 MCG: at 06:21

## 2024-10-25 RX ADMIN — SODIUM BICARBONATE 50 MEQ: 84 INJECTION, SOLUTION INTRAVENOUS at 00:30

## 2024-10-25 RX ADMIN — VASOPRESSIN 0.04 UNITS/MIN: 20 INJECTION INTRAVENOUS at 05:22

## 2024-10-25 RX ADMIN — MAGNESIUM SULFATE HEPTAHYDRATE 2000 MG: 40 INJECTION, SOLUTION INTRAVENOUS at 20:28

## 2024-10-25 RX ADMIN — SODIUM CHLORIDE, POTASSIUM CHLORIDE, SODIUM LACTATE AND CALCIUM CHLORIDE 1000 ML: 600; 310; 30; 20 INJECTION, SOLUTION INTRAVENOUS at 08:48

## 2024-10-25 RX ADMIN — Medication 50 MCG: at 04:50

## 2024-10-25 RX ADMIN — PROPOFOL 35 MCG/KG/MIN: 10 INJECTION, EMULSION INTRAVENOUS at 16:33

## 2024-10-25 RX ADMIN — Medication 100 MCG/HR: at 06:21

## 2024-10-25 RX ADMIN — CALCIUM GLUCONATE 1000 MG: 20 INJECTION, SOLUTION INTRAVENOUS at 08:36

## 2024-10-25 RX ADMIN — SODIUM CHLORIDE, POTASSIUM CHLORIDE, SODIUM LACTATE AND CALCIUM CHLORIDE: 600; 310; 30; 20 INJECTION, SOLUTION INTRAVENOUS at 23:00

## 2024-10-25 RX ADMIN — INSULIN LISPRO 1 UNITS: 100 INJECTION, SOLUTION INTRAVENOUS; SUBCUTANEOUS at 22:53

## 2024-10-25 RX ADMIN — PROPOFOL 35 MCG/KG/MIN: 10 INJECTION, EMULSION INTRAVENOUS at 12:59

## 2024-10-25 RX ADMIN — SODIUM BICARBONATE 50 MEQ: 84 INJECTION INTRAVENOUS at 01:37

## 2024-10-25 ASSESSMENT — PULMONARY FUNCTION TESTS
PIF_VALUE: 28
PIF_VALUE: 31
PIF_VALUE: 8
PIF_VALUE: 31
PIF_VALUE: 26
PIF_VALUE: 37
PIF_VALUE: 31
PIF_VALUE: 28
PIF_VALUE: 30
PIF_VALUE: 28
PIF_VALUE: 30
PIF_VALUE: 33
PIF_VALUE: 35
PIF_VALUE: 30
PIF_VALUE: 30
PIF_VALUE: 33
PIF_VALUE: 32
PIF_VALUE: 27
PIF_VALUE: 8
PIF_VALUE: 31
PIF_VALUE: 28
PIF_VALUE: 33
PIF_VALUE: 28
PIF_VALUE: 29
PIF_VALUE: 31
PIF_VALUE: 35
PIF_VALUE: 31
PIF_VALUE: 26
PIF_VALUE: 28
PIF_VALUE: 31
PIF_VALUE: 30
PIF_VALUE: 35
PIF_VALUE: 33
PIF_VALUE: 30
PIF_VALUE: 32
PIF_VALUE: 29
PIF_VALUE: 27
PIF_VALUE: 33
PIF_VALUE: 35
PIF_VALUE: 31
PIF_VALUE: 32
PIF_VALUE: 36
PIF_VALUE: 30
PIF_VALUE: 31
PIF_VALUE: 29
PIF_VALUE: 27
PIF_VALUE: 25
PIF_VALUE: 29
PIF_VALUE: 33
PIF_VALUE: 30
PIF_VALUE: 30
PIF_VALUE: 31
PIF_VALUE: 34
PIF_VALUE: 34
PIF_VALUE: 31
PIF_VALUE: 31
PIF_VALUE: 28
PIF_VALUE: 33
PIF_VALUE: 31
PIF_VALUE: 25
PIF_VALUE: 29
PIF_VALUE: 30
PIF_VALUE: 25
PIF_VALUE: 31
PIF_VALUE: 31
PIF_VALUE: 27
PIF_VALUE: 35
PIF_VALUE: 34
PIF_VALUE: 26
PIF_VALUE: 32
PIF_VALUE: 33
PIF_VALUE: 29
PIF_VALUE: 35
PIF_VALUE: 34
PIF_VALUE: 32
PIF_VALUE: 33
PIF_VALUE: 34
PIF_VALUE: 29
PIF_VALUE: 29
PIF_VALUE: 28
PIF_VALUE: 28
PIF_VALUE: 30
PIF_VALUE: 37
PIF_VALUE: 34
PIF_VALUE: 30
PIF_VALUE: 26
PIF_VALUE: 32
PIF_VALUE: 29
PIF_VALUE: 29
PIF_VALUE: 30
PIF_VALUE: 30
PIF_VALUE: 31
PIF_VALUE: 32
PIF_VALUE: 30
PIF_VALUE: 28
PIF_VALUE: 28
PIF_VALUE: 34
PIF_VALUE: 31
PIF_VALUE: 25
PIF_VALUE: 8
PIF_VALUE: 27
PIF_VALUE: 32
PIF_VALUE: 33

## 2024-10-25 ASSESSMENT — PAIN SCALES - GENERAL
PAINLEVEL_OUTOF10: 0
PAINLEVEL_OUTOF10: 3
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
## 2024-10-25 NOTE — PROCEDURES
PROCEDURE NOTE  Date: 10/25/2024   Name: Glenroy Simmons II  YOB: 1975    Chest Tube Insertion    Date/Time: 10/25/2024 1:01 AM    Performed by: Raul Funes DO  Authorized by: Raul Funes DO  Consent: The procedure was performed in an emergent situation.  Indications: hemothorax  Preparation: skin prepped with chlorhexidine  Placement location: left lateral  Scalpel size: 11  Tube size: 32 Albanian  Dissection instrument: finger  Tube connected to: suction  Drainage characteristics: bloody  Drainage amount: 1400 ml  Suture material: 0 silk  Dressing: 4x4 sterile gauze and petrolatum-impregnated gauze  Post-insertion x-ray findings: tube in good position  Patient tolerance: patient tolerated the procedure well with no immediate complications  Comments: Dr. Schaffer was present for procedure.

## 2024-10-25 NOTE — PROGRESS NOTES
Pulseless at 0026 - CPR initiated  Pulse check 0028 - no pulse- CPR resumed  Epi 0028  Bicarb 0030  Pulse check 0031 - no pulse - CPR resumed  Epi 0032  Pulse check 0033 - no pulse - CPR resumed  Epi 0034  Pulse check 0035 0 No pulse - CPR resumed  Epi 0036   Pulse check 0037 - ROSC   Bicarb 0039

## 2024-10-25 NOTE — BRIEF OP NOTE
Brief Postoperative Note      Patient: Glenroy Simmons II  YOB: 1975  MRN: [id number]    Date of Procedure: 10/24/2024  Active extravasation from the right iliac artery    Post-Op Diagnosis: Same       * No procedures listed *    * No surgeons listed *    Assistant:  * No surgical staff found *    Anesthesia: * No anesthesia type entered *    Estimated Blood Loss (mL): 2000    Complications: Bleeding    Specimens:   * No specimens in log *    Implants:  Implant Name Type Inv. Item Serial No.  Lot No. LRB No. Used Action   COIL EMB L17CM LOOP DIA6MM 0.035IN [employer] - [id number] Vascular coils COIL EMB L17CM LOOP DIA6MM 0.035IN [employer]  [employer]- [id number] Left 1 Implanted   COIL EMB L24CM LOOP DIA8MM 0.035IN [employer] - [id number] Vascular coils COIL EMB L24CM LOOP DIA8MM 0.035IN [employer]  [employer]aze- [id number] Left 1 Implanted   COIL EMB L19CM LOOP [id number] 0.035IN [employer] - [id number] Vascular coils COIL EMB L19CM LOOP [id number] 0.035IN [employer]  [employer]- [id number] Left 1 Implanted   COIL EMB L19CM LOOP [id number] 0.035IN [employer] - [id number] Vascular coils COIL EMB L19CM LOOP [id number] 0.035IN [employer]  [employer]aze- [id number] Left 1 Implanted   COIL EMB L17CM LOOP DIA6MM 0.035IN [employer] - [id number] Vascular coils COIL EMB L17CM LOOP DIA6MM 0.035IN HY[employer] [facility] [id number] Left 1 Implanted   COIL EMB  5 MMX15 CM DETACHABLE PLAT TUNGSTEN EMBOLD - [id number] Vascular coils COIL EMB  5 MMX15 CM DETACHABLE PLAT TUNGSTEN EMBOLD  [employer]- [id number] Left 1 Implanted   COIL EMB  5 MMX15 CM DETACHABLE PLAT TUNGSTEN EMBOLD - [id number] Vascular coils COIL EMB  5 MMX15 CM DETACHABLE PLAT TUNGSTEN EMBOLD  [employer]- [id number] Left 1 Implanted   COIL EMB  5 MMX15 CM DETACHABLE

## 2024-10-25 NOTE — PROGRESS NOTES
Patient noted to be increasingly hypotensive in ED throughout orthopedic intervention. Initial base excess -6.6, imaging with significant artifact from protheses however evidence of pelvic hematoma and given new hypotension concern for active bleed from his pelvic fractures. Woods inserted with clear yellow urine return and no resistance. Rectal exam performed, no blood present. 1 unit rbc in ED for ongoing hypotension despite completion of sedation, VBG with acute drop in Hb. Vitamin K given for Warfarin, which patient stated he took today. INR 1.4 therefore decision to hold on K Centra reversal.     Continued resuscitation and monitoring in SICU.     Discussed with Dr. Mcgovern who was in agreement with IR embolization tonight for large pelvic hematoma.       Electronically signed by Radha Salamanca MD on 10/24/2024 at 8:21 PM

## 2024-10-25 NOTE — PROGRESS NOTES
Perfuse bleeding around chest tube and onto floor. Dr. Funes at bedside. Suture added and quick clot applied.

## 2024-10-25 NOTE — PROGRESS NOTES
Unable to complete admission database at this time. Patient intubated and sedated. Emergent status.

## 2024-10-25 NOTE — PRE SEDATION
Sedation Pre-Procedure Note    Patient Name: Glenroy Simmons II   YOB: 1975  Room/Bed: 3806/[facility]6-A  Medical Record Number: [id number]  Date: 10/24/2024   Time: 10:58 PM       Indication:  trauma, hemodynamic instability    Consent: I have discussed with the patient and/or the patient representative the indication, alternatives, and the possible risks and/or complications of the planned procedure and the anesthesia methods. The patient and/or patient representative appear to understand and agree to proceed.    Vital Signs:   Vitals:    10/24/24 2120   BP: (!) 63/46   Pulse: (!) 139   Resp: (!) 45   Temp:    SpO2: 100%       Past Medical History:   has no past medical history on file.    Past Surgical History:   has no past surgical history on file.    Medications:   Scheduled Meds:    methocarbamol  1,000 mg Oral 4x Daily    lidocaine-EPINEPHrine        ondansetron  4 mg IntraVENous Once    sodium chloride flush  10 mL IntraVENous 2 times per day    polyethylene glycol  17 g Oral Daily    acetaminophen  650 mg Oral Q6H    gabapentin  300 mg Oral Q8H    midazolam         Continuous Infusions:    sodium chloride      lactated ringers IV soln 125 mL/hr at 10/24/24 2215    sodium chloride      sodium chloride      norepinephrine Stopped (10/24/24 2200)    sodium chloride      fentaNYL 75 mcg/hr (10/24/24 2215)    midazolam 3 mg/hr (10/24/24 2215)    VASOpressin 20 Units in sodium chloride 0.9 % 100 mL infusion Stopped (10/24/24 2203)    sodium chloride       PRN Meds: acetaminophen, lidocaine-EPINEPHrine, sodium chloride flush, sodium chloride, ondansetron **OR** ondansetron, oxyCODONE **OR** oxyCODONE, HYDROmorphone **OR** HYDROmorphone, sodium chloride, sodium chloride, sodium chloride, prochlorperazine, midazolam, fentaNYL, sodium chloride, heparin (porcine), iopamidol  Home Meds:   Prior to Admission medications    Not on File     Coumadin Use Last 7 Days:  no  Antiplatelet drug therapy use last 7 days:

## 2024-10-25 NOTE — ANESTHESIA POSTPROCEDURE EVALUATION
Department of Anesthesiology  Postprocedure Note    Patient: Glenroy Simmons II  MRN: [id number]  YOB: 1975  Date of evaluation: 10/25/2024    Procedure Summary       Date: 10/24/24 Room / Location: [facility] Special Procedures; [facility] Radiology    Anesthesia Start: 2132 Anesthesia Stop: 2316    Procedure: IR EMBOLIZATION HEMORRHAGE Diagnosis: (Pelvic fracture active bleeding)    Scheduled Providers: RadiologistRangel General Responsible Provider: Gama Brenner DO    Anesthesia Type: General ASA Status: 4 - Emergent            Anesthesia Type: General    Rere Phase I:      Rere Phase II:      Anesthesia Post Evaluation    Patient location during evaluation: ICU  Patient participation: complete - patient participated  Level of consciousness: sedated and ventilated  Airway patency: patent  Nausea & Vomiting: no nausea and no vomiting  Cardiovascular status: hemodynamically unstable  Respiratory status: ventilator  Hydration status: hypovolemic  Comments: After the IR suite patient was taken to CT scan.  He had a chest, abdomen, and pelvis.  He was then transferred back to SICU.     Pain management: adequate    No notable events documented.

## 2024-10-25 NOTE — PROGRESS NOTES
Department of Orthopedic Surgery  Resident Progress Note    Patient seen and examined.  Patient currently intubated and sedated.  Very hemodynamically unstable overnight s/p interventional radiology embolization, needed 62 units of blood products overnight per nursing.  Has been agitated and is currently in restraints.    VITALS:  BP 98/63   Pulse (!) 136   Temp 99.5 °F (37.5 °C)   Resp 20   Ht 1.854 m (6' 1\")   Wt 136.1 kg (300 lb)   SpO2 94%   BMI 39.58 kg/m²     General: Intubated and sedated    MUSCULOSKELETAL:   right lower extremity:  Distal femur traction pin in place with 20 pounds of weight, functioning appropriately  Compartments soft and compressible  2+ DP/PT pulses.  Foot warm and well-perfused  left lower extremity:  In left knee immobilizer  Compartments soft and compressible  +2/4 DP & PT pulses, Brisk Cap refill, Toes warm and perfused  Right upper extremity:  In splint  Fingers warm and well-perfused    CBC:   Lab Results   Component Value Date/Time    WBC 7.9 10/25/2024 02:30 AM    HGB 10.0 10/25/2024 02:30 AM    HCT 31.2 10/25/2024 02:30 AM    PLT 50 10/25/2024 02:30 AM     PT/INR:    Lab Results   Component Value Date/Time    PROTIME 16.1 10/24/2024 06:17 PM    INR 1.5 10/24/2024 06:17 PM       ASSESSMENT  Left Posterior wall posterior column acetabular fracture  Right associated both columns acetabular fracture with associated sciatic nerve palsy  Left greater trochanter fracture  Right distal radius fracture  Left tibial plateau fracture    PLAN      Remain in skeletal traction to right lower extremity with 20 pounds of traction: Nonweightbearing to bilateral lower extremities, right upper extremity  CT left knee pending  The patient will require surgical intervention for bilateral pelvis fractures, surgical timing to be determined.  Tentatively planned for 10/27 or 10/28 after medical optimization and stabilization  Deep venous thrombosis prophylaxis -Per SICU  Pain Control: IV and PO
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
## 2024-10-25 NOTE — PROGRESS NOTES
Patient given multiple blood products via emergent administration. Blood checked via 2 RN verification by (Dorita Malloy RN and Anand Escamilla RN). Patient monitored for 15 minutes after initiation of transfusion. No transfusion reaction suspected. Documentation of products listed below:      RBC Unit #    [id number]  [id number]  [id number]  [id number]  [id number]  [id number]  [id number]      FFP Unit #    [id number]  [id number]  [id number]  [id number]  [id number]      PLT Unit #  [id number]

## 2024-10-25 NOTE — PROGRESS NOTES
Physical Therapy    PT consult to evaluate/treat received and appreciated.  Pt chart reviewed and evaluation attempted.  Pt is currently on hold d/t traction and pending orthopedic surgery.  Will check back as able.  Thank you.        Justo Haq, PT, DPT   [id number]

## 2024-10-25 NOTE — PLAN OF CARE
Problem: Discharge Planning  Goal: Discharge to home or other facility with appropriate resources  10/24/2024 2029 by Elena Marquez RN  Outcome: Progressing  10/24/2024 1841 by Ellen Gomez RN  Outcome: Progressing     Problem: Pain  Goal: Verbalizes/displays adequate comfort level or baseline comfort level  10/24/2024 2029 by Elena Marquez RN  Outcome: Progressing  10/24/2024 1841 by Ellen Gomez RN  Outcome: Progressing     Problem: Safety - Adult  Goal: Free from fall injury  10/24/2024 2029 by Elena Marquez RN  Outcome: Progressing  10/24/2024 1841 by Ellen Gomez RN  Outcome: Progressing     Problem: Skin/Tissue Integrity  Goal: Absence of new skin breakdown  Description: 1.  Monitor for areas of redness and/or skin breakdown  2.  Assess vascular access sites hourly  3.  Every 4-6 hours minimum:  Change oxygen saturation probe site  4.  Every 4-6 hours:  If on nasal continuous positive airway pressure, respiratory therapy assess nares and determine need for appliance change or resting period.  10/24/2024 2029 by Elena Marquez RN  Outcome: Progressing  10/24/2024 1841 by Ellen Gomez RN  Outcome: Progressing

## 2024-10-25 NOTE — PROCEDURES
PROCEDURE NOTE  Date: 10/24/2024   Name: Glenroy Simmons II  YOB: 1975    Insert Arterial Line    Date/Time: 10/24/2024 8:16 PM    Performed by: Radha Salamanca MD  Authorized by: Radha Salamanca MD  Consent: The procedure was performed in an emergent situation.  Required items: required blood products, implants, devices, and special equipment available  Patient identity confirmed: arm band  Preparation: Patient was prepped and draped in the usual sterile fashion.  Indications: hemodynamic monitoring  Location: left radial    Anesthesia:  Local Anesthetic: lidocaine 1% with epinephrine  Elvis's test normal: yes  Needle gauge: 20  Seldinger technique: Seldinger technique used  Number of attempts: 2  Post-procedure: line sutured and dressing applied  Post-procedure CMS: unchanged  Patient tolerance: patient tolerated the procedure well with no immediate complications  Comments: Dr Schaffer was present for procedure

## 2024-10-25 NOTE — PROCEDURES
PROCEDURE NOTE  Date: 10/24/2024   Name: Glenroy Simmons II  YOB: 1975    Intubation    Date/Time: 10/24/2024 10:31 PM    Performed by: Raul Funes DO  Authorized by: Raul Fuens DO  Consent: The procedure was performed in an emergent situation.  Indications: hypoxemia, respiratory distress and airway protection  Intubation method: video-assisted  Patient status: paralyzed (RSI)  Sedatives: ketmine  Paralytic: succinylcholine  Tube size: 8.0 mm  Tube type: cuffed  Number of attempts: 1  Ventilation between attempts: BVM  Cords visualized: yes  Post-procedure assessment: chest rise and ETCO2 monitor  Breath sounds: equal  Cuff inflated: yes  Patient tolerance: patient tolerated the procedure well with no immediate complications  Comments: Dr. Schaffer was present for procedure.

## 2024-10-25 NOTE — PROGRESS NOTES
Patient admitted with a temperature within the mild hypothermia range (95-98.5 degrees Fahrenheit). The following interventions have been implemented {Mild Hypothermia (95-98.5):12996}. Temperature will be monitored every 15 minutes until goal temperature of 98.6 degrees Fahrenheit has been recorded.

## 2024-10-25 NOTE — PROCEDURES
PROCEDURE NOTE  Date: 10/24/2024   Name: Glenroy Simmons II  YOB: 1975    Insert Arterial Line    Date/Time: 10/24/2024 10:25 PM    Performed by: Raul Funes DO  Authorized by: Raul Funes DO  Consent: The procedure was performed in an emergent situation.  Indications: multiple ABGs, respiratory failure and hemodynamic monitoring  Location: right radial  Post-procedure: line sutured and dressing applied  Patient tolerance: patient tolerated the procedure well with no immediate complications  Comments: Dr. Schaffer was present for procedure.
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
## 2024-10-25 NOTE — ACP (ADVANCE CARE PLANNING)
Advance Care Planning   Healthcare Decision Maker:    Primary Decision Maker: JeromeLawrence - Child - [phone]    Click here to complete Healthcare Decision Makers including selection of the Healthcare Decision Maker Relationship (ie \"Primary\").

## 2024-10-25 NOTE — PROCEDURES
PROCEDURE NOTE  Date: 10/24/2024   Name: Glenroy Simmons II  YOB: 1975    CENTRAL LINE    Date/Time: 10/24/2024 10:28 PM    Performed by: Raul Funes DO  Authorized by: Raul Funes DO  Consent: The procedure was performed in an emergent situation.  Indications: vascular access  Preparation: skin prepped with ChloraPrep  Skin prep agent dried: skin prep agent completely dried prior to procedure  Sterile barriers: all five maximum sterile barriers used - cap, mask, sterile gown, sterile gloves, and large sterile sheet  Hand hygiene: hand hygiene performed prior to central venous catheter insertion  Location details: right internal jugular  Patient position: Trendelenburg  Catheter type: triple lumen  Catheter size: 7 Fr  Pre-procedure: landmarks identified  Ultrasound guidance: yes  Sterile ultrasound techniques: sterile gel and sterile probe covers were used  Number of attempts: 2  Successful placement: yes  Post-procedure: line sutured and dressing applied  Assessment: blood return through all ports and free fluid flow  Patient tolerance: patient tolerated the procedure well with no immediate complications  Comments: Dr. Schaffer was present for procedure.         I was present for and supervised this procedure.  I agree with Dr. Funes's documentation.    Yazan Schaffer MD

## 2024-10-25 NOTE — PLAN OF CARE
Problem: Pain  Goal: Verbalizes/displays adequate comfort level or baseline comfort level  10/25/2024 1013 by Beka Simmons RN  Outcome: Progressing  10/25/2024 0706 by Dorita Malloy  Outcome: Progressing  10/24/2024 2029 by Elena Marquez RN  Outcome: Progressing     Problem: Safety - Adult  Goal: Free from fall injury  10/25/2024 1013 by Beka Simmons RN  Outcome: Progressing  10/25/2024 0706 by Dorita Malloy  Outcome: Progressing  10/24/2024 2029 by Elena Marquez RN  Outcome: Progressing     Problem: Skin/Tissue Integrity  Goal: Absence of new skin breakdown  Description: 1.  Monitor for areas of redness and/or skin breakdown  2.  Assess vascular access sites hourly  3.  Every 4-6 hours minimum:  Change oxygen saturation probe site  4.  Every 4-6 hours:  If on nasal continuous positive airway pressure, respiratory therapy assess nares and determine need for appliance change or resting period.  10/25/2024 1013 by Beka Simmons RN  Outcome: Progressing  10/25/2024 0706 by Dorita Malloy  Outcome: Progressing  10/24/2024 2029 by Elena Marquez RN  Outcome: Progressing     Problem: ABCDS Injury Assessment  Goal: Absence of physical injury  10/25/2024 1013 by Beka Simmons RN  Outcome: Progressing  10/25/2024 0706 by Dorita Malloy  Outcome: Progressing     Problem: Safety - Medical Restraint  Goal: Remains free of injury from restraints (Restraint for Interference with Medical Device)  Description: INTERVENTIONS:  1. Determine that other, less restrictive measures have been tried or would not be effective before applying the restraint  2. Evaluate the patient's condition at the time of restraint application  3. Inform patient/family regarding the reason for restraint  4. Q2H: Monitor safety, psychosocial status, comfort, nutrition and hydration  10/25/2024 1013 by Beka Simmons RN  Outcome: Progressing  10/25/2024 0706 by Dorita Malloy  Outcome: Progressing  Flowsheets (Taken 10/25/2024

## 2024-10-25 NOTE — PROGRESS NOTES
TRAUMA TERTIARY    Admit Date: 10/24/2024    CC:    Chief Complaint   Patient presents with    Trauma       Alcohol pre-screening:   How many times in the past year have you had 4-5 or more drinks in a day?  unable to answer  How much do you drink on a daily basis? unable to answer     Drug Pre-screening:    How many times in the past year have you used a recreational drug or used a prescription medication for non medical reasons?  Unable to answer     Mood Prescreening:    During the past two weeks, have you been bothered by little interest or pleasure doing things?  Unable to answer  During the past two weeks, have you been bothered by feeling down, depressed or hopeless?  Unable to answer    Subjective:       Patient went for IR embolization yesterday late evening. Patient found to have R internal iliac transection that was embolized. Patient re-scanned due to continued hypotension and instability and found to have a large volume left sided hemothorax. L chest tube was placed with immediate output of approximately 1500 cc. Patient on levo/vaso but continued to make urine. Patient had massive transfusion completed throughout that evening; RBC/FFP/plts/TXA.       Objective:   Patient Vitals for the past 8 hrs:   BP Temp Temp src Pulse Resp SpO2   10/25/24 0540 -- -- -- (!) 140 22 92 %   10/25/24 0535 -- -- -- (!) 140 22 94 %   10/25/24 0530 -- -- -- (!) 139 21 95 %   10/25/24 0525 -- -- -- (!) 139 22 95 %   10/25/24 0520 -- -- -- (!) 138 21 94 %   10/25/24 0515 -- -- -- (!) 138 23 94 %   10/25/24 0510 -- -- -- (!) 137 21 94 %   10/25/24 0505 -- -- -- (!) 136 20 94 %   10/25/24 0500 -- 99.5 °F (37.5 °C) -- (!) 136 20 94 %   10/25/24 0445 -- -- -- (!) 134 20 95 %   10/25/24 0440 -- -- -- (!) 133 20 95 %   10/25/24 0435 -- -- -- (!) 134 20 95 %   10/25/24 0430 -- -- -- (!) 133 22 95 %   10/25/24 0425 -- -- -- (!) 133 22 96 %   10/25/24 0420 -- -- -- (!) 133 20 95 %   10/25/24 0415 -- -- -- (!) 134 18 95 %   10/25/24  abrasions and ecchymosis, laceration to forehead repaired      Spine:     UNABLE TO ASSESS  Spine Tenderness ROM   Cervical 0 /10 Normal   Thoracic 0 /10 Normal   Lumbar 0 /10 Normal     Musculoskeletal:    Joint Tenderness Swelling/Deformity ROM   Right shoulder      Left shoulder      Right elbow      Left elbow      Right wrist      Left wrist      Right hand grasp      Left hand grasp      Right hip  present    Left hip  present    Right knee      Left knee  present    Right ankle      Left ankle      Right foot      Left foot            CONSULTS: Orthopedic Surgery, Interventional radiology.       Principal Problem:    Trauma  Active Problems:    Multiple closed fractures of pelvis with unstable disruption of pelvic [place] (HCC)    Acute respiratory failure with hypoxia    Hemothorax on left    Acute blood loss anemia    Dislocation of hip prosthesis (HCC)    Scalp laceration    Lactic acid acidosis    Liliana-prosthetic fracture around prosthetic hip  Resolved Problems:    * No resolved hospital problems. *        Assessment/Plan:     Neuro:     - Acute Pain Syndrome: Tylenol, fent gtt, dilaudid  CV:    - Cardiac arrest with ROSC: Cardiology consulted, EKG 10/25 showed STEMI, unable to anticoagulate, support care, correct electrolyte abnormalities as able.    - STEMI: cardiology following, supportive care, appreciate recommendations, ECHO pending   - Hx DVT on Warfarin/ASA: s/p reversal with Vit K, continue to hold all anticoagulation   - Hx L iliac stent  Pulm:    - Acute Respiratory Failure: continue mechanical ventilation, ABG q6h, wean FiO2 as able   - L 3-7 rib fx, R 2-6 rib fx   - Large L hemothorax: s/p L CT placement 10/25, 1500 cc immediate output, continue to monitor output closely  GI:    - Dysphagia secondary to respiratory failure: hold tube feeds secondary to pressor requirements   - GI prophylaxis: protonix  Renal:    - JAMMIE: rising Cr, continues to make urine, Nephrology consulted - appreciate
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
## 2024-10-25 NOTE — ANESTHESIA PRE PROCEDURE
Department of Anesthesiology  Preprocedure Note       Name:  Glenroy Simmons II   Age:  48 y.o.  :  1975                                          MRN:  [id number]         Date:  10/24/2024      Surgeon: * No surgeons listed *    Procedure: * No procedures listed *    Medications prior to admission:   Prior to Admission medications    Not on File       Current medications:    Current Facility-Administered Medications   Medication Dose Route Frequency Provider Last Rate Last Admin   • acetaminophen (TYLENOL) tablet 650 mg  650 mg Oral Q4H PRN Annelise Pedroza DO       • methocarbamol (ROBAXIN) tablet 1,000 mg  1,000 mg Oral 4x Daily Annelise Pedroza DO       • lidocaine-EPINEPHrine 1 %-[id number] injection            • ondansetron (ZOFRAN) injection 4 mg  4 mg IntraVENous Once Ester Bermudez MD       • sodium chloride flush 0.9 % injection 10 mL  10 mL IntraVENous 2 times per day Radha Salamanca MD       • sodium chloride flush 0.9 % injection 10 mL  10 mL IntraVENous PRN Radha Salamanca MD       • 0.9 % sodium chloride infusion   IntraVENous PRN Radha Salamanca MD       • ondansetron (ZOFRAN-ODT) disintegrating tablet 4 mg  4 mg Oral Q8H PRN Radha Salamanca MD        Or   • ondansetron (ZOFRAN) injection 4 mg  4 mg IntraVENous Q6H PRN Radha Salamanca MD   4 mg at 10/24/24 1825   • polyethylene glycol (GLYCOLAX) packet 17 g  17 g Oral Daily Radha Salamanca MD       • lactated ringers IV soln infusion SOLN   IntraVENous Continuous Radha Salamanca  mL/hr at 10/24/24 1804 New Bag at 10/24/24 1804   • acetaminophen (TYLENOL) tablet 650 mg  650 mg Oral Q6H Radha Salamanca MD       • oxyCODONE (ROXICODONE) immediate release tablet 5 mg  5 mg Oral Q4H PRN Radha Salamanca MD        Or   • oxyCODONE HCl (OXY-IR) immediate release tablet 10 mg  10 mg Oral Q4H PRN Radha Salamanca MD       • HYDROmorphone (DILAUDID) injection 0.5 mg  0.5 mg IntraVENous Q3H PRN Radha Salamanca MD        Or
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
## 2024-10-26 ENCOUNTER — APPOINTMENT (OUTPATIENT)
Dept: GENERAL RADIOLOGY | Age: 49
End: 2024-10-26
Payer: MEDICARE

## 2024-10-26 LAB
AADO2: 438.9 MMHG
AADO2: 459.3 MMHG
AADO2: 487.5 MMHG
AADO2: 494.4 MMHG
ALBUMIN SERPL-MCNC: 2.4 G/DL (ref 3.5–5.2)
ALBUMIN SERPL-MCNC: 2.5 G/DL (ref 3.5–5.2)
ALBUMIN SERPL-MCNC: 2.5 G/DL (ref 3.5–5.2)
ALP SERPL-CCNC: 100 U/L (ref 40–129)
ALP SERPL-CCNC: 100 U/L (ref 40–129)
ALP SERPL-CCNC: 112 U/L (ref 40–129)
ALP SERPL-CCNC: 88 U/L (ref 40–129)
ALP SERPL-CCNC: 92 U/L (ref 40–129)
ALT SERPL-CCNC: 621 U/L (ref 0–40)
ALT SERPL-CCNC: 628 U/L (ref 0–40)
ALT SERPL-CCNC: 628 U/L (ref 0–40)
ALT SERPL-CCNC: 629 U/L (ref 0–40)
ALT SERPL-CCNC: 878 U/L (ref 0–40)
ANION GAP SERPL CALCULATED.3IONS-SCNC: 13 MMOL/L (ref 7–16)
ANION GAP SERPL CALCULATED.3IONS-SCNC: 6 MMOL/L (ref 7–16)
ANION GAP SERPL CALCULATED.3IONS-SCNC: 7 MMOL/L (ref 7–16)
ANION GAP SERPL CALCULATED.3IONS-SCNC: 7 MMOL/L (ref 7–16)
ANION GAP SERPL CALCULATED.3IONS-SCNC: 9 MMOL/L (ref 7–16)
ARM BAND NUMBER: NORMAL
AST SERPL-CCNC: 1061 U/L (ref 0–39)
AST SERPL-CCNC: 1160 U/L (ref 0–39)
AST SERPL-CCNC: 1230 U/L (ref 0–39)
AST SERPL-CCNC: 1277 U/L (ref 0–39)
AST SERPL-CCNC: 1374 U/L (ref 0–39)
B.E.: -1.2 MMOL/L (ref -3–3)
B.E.: -3.2 MMOL/L (ref -3–3)
B.E.: 1.4 MMOL/L (ref -3–3)
B.E.: 2.5 MMOL/L (ref -3–3)
BASOPHILS # BLD: 0 K/UL (ref 0–0.2)
BASOPHILS NFR BLD: 0 % (ref 0–2)
BILIRUB SERPL-MCNC: 0.6 MG/DL (ref 0–1.2)
BILIRUB SERPL-MCNC: 0.7 MG/DL (ref 0–1.2)
BLOOD BANK BLOOD PRODUCT EXPIRATION DATE: NORMAL
BLOOD BANK BLOOD PRODUCT EXPIRATION DATE: NORMAL
BLOOD BANK DISPENSE STATUS: NORMAL
BLOOD BANK DISPENSE STATUS: NORMAL
BLOOD BANK ISBT PRODUCT BLOOD TYPE: 5100
BLOOD BANK ISBT PRODUCT BLOOD TYPE: 5100
BLOOD BANK PRODUCT CODE: NORMAL
BLOOD BANK PRODUCT CODE: NORMAL
BLOOD BANK UNIT TYPE AND RH: NORMAL
BLOOD BANK UNIT TYPE AND RH: NORMAL
BPU ID: NORMAL
BPU ID: NORMAL
BUN SERPL-MCNC: 29 MG/DL (ref 6–20)
BUN SERPL-MCNC: 29 MG/DL (ref 6–20)
BUN SERPL-MCNC: 31 MG/DL (ref 6–20)
BUN SERPL-MCNC: 33 MG/DL (ref 6–20)
BUN SERPL-MCNC: 36 MG/DL (ref 6–20)
CA-I BLD-SCNC: 1.1 MMOL/L (ref 1.15–1.33)
CA-I BLD-SCNC: 1.12 MMOL/L (ref 1.15–1.33)
CALCIUM SERPL-MCNC: 7.1 MG/DL (ref 8.6–10.2)
CALCIUM SERPL-MCNC: 7.2 MG/DL (ref 8.6–10.2)
CALCIUM SERPL-MCNC: 7.3 MG/DL (ref 8.6–10.2)
CALCIUM SERPL-MCNC: 7.3 MG/DL (ref 8.6–10.2)
CALCIUM SERPL-MCNC: 7.4 MG/DL (ref 8.6–10.2)
CHLORIDE SERPL-SCNC: 107 MMOL/L (ref 98–107)
CHLORIDE SERPL-SCNC: 108 MMOL/L (ref 98–107)
CHLORIDE SERPL-SCNC: 109 MMOL/L (ref 98–107)
CHLORIDE SERPL-SCNC: 109 MMOL/L (ref 98–107)
CHLORIDE SERPL-SCNC: 110 MMOL/L (ref 98–107)
CK SERPL-CCNC: ABNORMAL U/L (ref 20–200)
CO2 SERPL-SCNC: 22 MMOL/L (ref 22–29)
CO2 SERPL-SCNC: 23 MMOL/L (ref 22–29)
CO2 SERPL-SCNC: 25 MMOL/L (ref 22–29)
CO2 SERPL-SCNC: 25 MMOL/L (ref 22–29)
CO2 SERPL-SCNC: 27 MMOL/L (ref 22–29)
COHB: 0.2 % (ref 0–1.5)
COHB: 0.3 % (ref 0–1.5)
COHB: 0.5 % (ref 0–1.5)
COHB: 3.8 % (ref 0–1.5)
COMPONENT: NORMAL
COMPONENT: NORMAL
CORTIS SERPL-MCNC: 25.5 UG/DL (ref 2.7–18.4)
CORTISOL COLLECTION INFO: ABNORMAL
CREAT SERPL-MCNC: 2.3 MG/DL (ref 0.7–1.2)
CREAT SERPL-MCNC: 2.4 MG/DL (ref 0.7–1.2)
CREAT SERPL-MCNC: 2.5 MG/DL (ref 0.7–1.2)
CREAT SERPL-MCNC: 2.5 MG/DL (ref 0.7–1.2)
CREAT SERPL-MCNC: 2.6 MG/DL (ref 0.7–1.2)
CRITICAL: ABNORMAL
DATE ANALYZED: ABNORMAL
DATE OF COLLECTION: ABNORMAL
EOSINOPHIL # BLD: 0 K/UL (ref 0.05–0.5)
EOSINOPHIL # BLD: 0.14 K/UL (ref 0.05–0.5)
EOSINOPHIL # BLD: 0.15 K/UL (ref 0.05–0.5)
EOSINOPHILS RELATIVE PERCENT: 0 % (ref 0–6)
EOSINOPHILS RELATIVE PERCENT: 1 % (ref 0–6)
EOSINOPHILS RELATIVE PERCENT: 1 % (ref 0–6)
ERYTHROCYTE [DISTWIDTH] IN BLOOD BY AUTOMATED COUNT: 15.8 % (ref 11.5–15)
ERYTHROCYTE [DISTWIDTH] IN BLOOD BY AUTOMATED COUNT: 15.9 % (ref 11.5–15)
ERYTHROCYTE [DISTWIDTH] IN BLOOD BY AUTOMATED COUNT: 16 % (ref 11.5–15)
ERYTHROCYTE [DISTWIDTH] IN BLOOD BY AUTOMATED COUNT: 16 % (ref 11.5–15)
ERYTHROCYTE [DISTWIDTH] IN BLOOD BY AUTOMATED COUNT: 16.1 % (ref 11.5–15)
FIO2: 90 %
GFR, ESTIMATED: 29 ML/MIN/1.73M2
GFR, ESTIMATED: 31 ML/MIN/1.73M2
GFR, ESTIMATED: 31 ML/MIN/1.73M2
GFR, ESTIMATED: 33 ML/MIN/1.73M2
GFR, ESTIMATED: 33 ML/MIN/1.73M2
GLUCOSE BLD-MCNC: 167 MG/DL (ref 74–99)
GLUCOSE BLD-MCNC: 190 MG/DL (ref 74–99)
GLUCOSE BLD-MCNC: 191 MG/DL (ref 74–99)
GLUCOSE BLD-MCNC: 194 MG/DL (ref 74–99)
GLUCOSE BLD-MCNC: 196 MG/DL (ref 74–99)
GLUCOSE BLD-MCNC: 223 MG/DL (ref 74–99)
GLUCOSE SERPL-MCNC: 174 MG/DL (ref 74–99)
GLUCOSE SERPL-MCNC: 191 MG/DL (ref 74–99)
GLUCOSE SERPL-MCNC: 193 MG/DL (ref 74–99)
GLUCOSE SERPL-MCNC: 195 MG/DL (ref 74–99)
GLUCOSE SERPL-MCNC: 202 MG/DL (ref 74–99)
HCO3: 20.8 MMOL/L (ref 22–26)
HCO3: 23 MMOL/L (ref 22–26)
HCO3: 26.4 MMOL/L (ref 22–26)
HCO3: 26.9 MMOL/L (ref 22–26)
HCT VFR BLD AUTO: 21.2 % (ref 37–54)
HCT VFR BLD AUTO: 21.5 % (ref 37–54)
HCT VFR BLD AUTO: 22.4 % (ref 37–54)
HCT VFR BLD AUTO: 23.5 % (ref 37–54)
HCT VFR BLD AUTO: 24.9 % (ref 37–54)
HGB BLD-MCNC: 7 G/DL (ref 12.5–16.5)
HGB BLD-MCNC: 7.1 G/DL (ref 12.5–16.5)
HGB BLD-MCNC: 7.5 G/DL (ref 12.5–16.5)
HGB BLD-MCNC: 8.1 G/DL (ref 12.5–16.5)
HGB BLD-MCNC: 8.5 G/DL (ref 12.5–16.5)
HHB: 0.2 % (ref 0–5)
HHB: 1 % (ref 0–5)
HHB: 1.5 % (ref 0–5)
HHB: 4.6 % (ref 0–5)
LAB: ABNORMAL
LACTATE BLDV-SCNC: 2 MMOL/L (ref 0.5–2.2)
LACTATE BLDV-SCNC: 2.3 MMOL/L (ref 0.5–2.2)
LACTATE BLDV-SCNC: 2.4 MMOL/L (ref 0.5–2.2)
LACTATE BLDV-SCNC: 2.6 MMOL/L (ref 0.5–2.2)
LYMPHOCYTES NFR BLD: 0.32 K/UL (ref 1.5–4)
LYMPHOCYTES NFR BLD: 0.75 K/UL (ref 1.5–4)
LYMPHOCYTES NFR BLD: 0.76 K/UL (ref 1.5–4)
LYMPHOCYTES NFR BLD: 0.97 K/UL (ref 1.5–4)
LYMPHOCYTES NFR BLD: 1.33 K/UL (ref 1.5–4)
LYMPHOCYTES RELATIVE PERCENT: 2 % (ref 20–42)
LYMPHOCYTES RELATIVE PERCENT: 4 % (ref 20–42)
LYMPHOCYTES RELATIVE PERCENT: 4 % (ref 20–42)
LYMPHOCYTES RELATIVE PERCENT: 6 % (ref 20–42)
LYMPHOCYTES RELATIVE PERCENT: 8 % (ref 20–42)
Lab: 1625
Lab: 2158
Lab: 452
Lab: 951
MAGNESIUM SERPL-MCNC: 1.9 MG/DL (ref 1.6–2.6)
MAGNESIUM SERPL-MCNC: 2 MG/DL (ref 1.6–2.6)
MAGNESIUM SERPL-MCNC: 2 MG/DL (ref 1.6–2.6)
MAGNESIUM SERPL-MCNC: 2.1 MG/DL (ref 1.6–2.6)
MAGNESIUM SERPL-MCNC: 2.2 MG/DL (ref 1.6–2.6)
MCH RBC QN AUTO: 29.4 PG (ref 26–35)
MCH RBC QN AUTO: 29.4 PG (ref 26–35)
MCH RBC QN AUTO: 29.5 PG (ref 26–35)
MCH RBC QN AUTO: 29.5 PG (ref 26–35)
MCH RBC QN AUTO: 30.1 PG (ref 26–35)
MCHC RBC AUTO-ENTMCNC: 33 G/DL (ref 32–34.5)
MCHC RBC AUTO-ENTMCNC: 33 G/DL (ref 32–34.5)
MCHC RBC AUTO-ENTMCNC: 33.5 G/DL (ref 32–34.5)
MCHC RBC AUTO-ENTMCNC: 34.1 G/DL (ref 32–34.5)
MCHC RBC AUTO-ENTMCNC: 34.5 G/DL (ref 32–34.5)
MCV RBC AUTO: 86.2 FL (ref 80–99.9)
MCV RBC AUTO: 87.4 FL (ref 80–99.9)
MCV RBC AUTO: 88.2 FL (ref 80–99.9)
MCV RBC AUTO: 89.1 FL (ref 80–99.9)
MCV RBC AUTO: 89.2 FL (ref 80–99.9)
METHB: 0 % (ref 0–1.5)
METHB: 0.3 % (ref 0–1.5)
METHB: 0.7 % (ref 0–1.5)
METHB: 2.2 % (ref 0–1.5)
MICROORGANISM SPEC CULT: NORMAL
MODE: AC
MONOCYTES NFR BLD: 0.32 K/UL (ref 0.1–0.95)
MONOCYTES NFR BLD: 0.45 K/UL (ref 0.1–0.95)
MONOCYTES NFR BLD: 0.56 K/UL (ref 0.1–0.95)
MONOCYTES NFR BLD: 0.56 K/UL (ref 0.1–0.95)
MONOCYTES NFR BLD: 0.74 K/UL (ref 0.1–0.95)
MONOCYTES NFR BLD: 2 % (ref 2–12)
MONOCYTES NFR BLD: 3 % (ref 2–12)
MONOCYTES NFR BLD: 3 % (ref 2–12)
MONOCYTES NFR BLD: 4 % (ref 2–12)
MONOCYTES NFR BLD: 4 % (ref 2–12)
NEUTROPHILS NFR BLD: 86 % (ref 43–80)
NEUTROPHILS NFR BLD: 90 % (ref 43–80)
NEUTROPHILS NFR BLD: 93 % (ref 43–80)
NEUTROPHILS NFR BLD: 93 % (ref 43–80)
NEUTROPHILS NFR BLD: 96 % (ref 43–80)
NEUTS SEG NFR BLD: 14.33 K/UL (ref 1.8–7.3)
NEUTS SEG NFR BLD: 14.63 K/UL (ref 1.8–7.3)
NEUTS SEG NFR BLD: 16.19 K/UL (ref 1.8–7.3)
NEUTS SEG NFR BLD: 17.16 K/UL (ref 1.8–7.3)
NEUTS SEG NFR BLD: 17.39 K/UL (ref 1.8–7.3)
NUCLEATED RED BLOOD CELLS: 1 PER 100 WBC
O2 SATURATION: 95.4 % (ref 92–98.5)
O2 SATURATION: 98.5 % (ref 92–98.5)
O2 SATURATION: 99 % (ref 92–98.5)
O2 SATURATION: 99.8 % (ref 92–98.5)
O2HB: 94.5 % (ref 94–97)
O2HB: 96 % (ref 94–97)
O2HB: 96 % (ref 94–97)
O2HB: 98.2 % (ref 94–97)
OPERATOR ID: 2067
OPERATOR ID: 366
OPERATOR ID: ABNORMAL
OPERATOR ID: ABNORMAL
PATIENT TEMP: 37 C
PCO2: 33 MMHG (ref 35–45)
PCO2: 36.5 MMHG (ref 35–45)
PCO2: 40.5 MMHG (ref 35–45)
PCO2: 43.5 MMHG (ref 35–45)
PEEP/CPAP: 10 CMH2O
PFO2: 0.97 MMHG/%
PFO2: 1.22 MMHG/%
PFO2: 1.65 MMHG/%
PFO2: 1.79 MMHG/%
PH BLOOD GAS: 7.4 (ref 7.35–7.45)
PH BLOOD GAS: 7.42 (ref 7.35–7.45)
PH BLOOD GAS: 7.42 (ref 7.35–7.45)
PH BLOOD GAS: 7.44 (ref 7.35–7.45)
PHOSPHATE SERPL-MCNC: 3.8 MG/DL (ref 2.5–4.5)
PHOSPHATE SERPL-MCNC: 3.9 MG/DL (ref 2.5–4.5)
PHOSPHATE SERPL-MCNC: 3.9 MG/DL (ref 2.5–4.5)
PHOSPHATE SERPL-MCNC: 4.2 MG/DL (ref 2.5–4.5)
PHOSPHATE SERPL-MCNC: 4.3 MG/DL (ref 2.5–4.5)
PLATELET # BLD AUTO: 76 K/UL (ref 130–450)
PLATELET CONFIRMATION: NORMAL
PLATELET, FLUORESCENCE: 63 K/UL (ref 130–450)
PLATELET, FLUORESCENCE: 68 K/UL (ref 130–450)
PLATELET, FLUORESCENCE: 79 K/UL (ref 130–450)
PLATELET, FLUORESCENCE: 83 K/UL (ref 130–450)
PMV BLD AUTO: 11.8 FL (ref 7–12)
PMV BLD AUTO: 12 FL (ref 7–12)
PMV BLD AUTO: 12.1 FL (ref 7–12)
PO2: 109.6 MMHG (ref 75–100)
PO2: 148.6 MMHG (ref 75–100)
PO2: 161.3 MMHG (ref 75–100)
PO2: 87.4 MMHG (ref 75–100)
POTASSIUM SERPL-SCNC: 4.9 MMOL/L (ref 3.5–5)
POTASSIUM SERPL-SCNC: 5.1 MMOL/L (ref 3.5–5)
POTASSIUM SERPL-SCNC: 5.4 MMOL/L (ref 3.5–5)
PROMYELOCYTES ABSOLUTE COUNT: 0.15 K/UL
PROMYELOCYTES: 1 %
PROT SERPL-MCNC: 3.8 G/DL (ref 6.4–8.3)
PROT SERPL-MCNC: 3.9 G/DL (ref 6.4–8.3)
PROT SERPL-MCNC: 4 G/DL (ref 6.4–8.3)
PROT SERPL-MCNC: 4 G/DL (ref 6.4–8.3)
PROT SERPL-MCNC: 4.1 G/DL (ref 6.4–8.3)
RBC # BLD AUTO: 2.38 M/UL (ref 3.8–5.8)
RBC # BLD AUTO: 2.41 M/UL (ref 3.8–5.8)
RBC # BLD AUTO: 2.54 M/UL (ref 3.8–5.8)
RBC # BLD AUTO: 2.69 M/UL (ref 3.8–5.8)
RBC # BLD AUTO: 2.89 M/UL (ref 3.8–5.8)
RBC # BLD: ABNORMAL 10*6/UL
RI(T): 2.72
RI(T): 3.09
RI(T): 4.45
RI(T): 5.66
RR MECHANICAL: 16 B/MIN
SODIUM SERPL-SCNC: 140 MMOL/L (ref 132–146)
SODIUM SERPL-SCNC: 141 MMOL/L (ref 132–146)
SODIUM SERPL-SCNC: 144 MMOL/L (ref 132–146)
SOURCE, BLOOD GAS: ABNORMAL
SPECIMEN DESCRIPTION: NORMAL
T4 SERPL-MCNC: 5.7 UG/DL (ref 4.5–11.7)
THB: 7.8 G/DL (ref 11.5–16.5)
THB: 7.8 G/DL (ref 11.5–16.5)
THB: 8.5 G/DL (ref 11.5–16.5)
THB: 8.6 G/DL (ref 11.5–16.5)
TIME ANALYZED: 1632
TIME ANALYZED: 2223
TIME ANALYZED: 509
TIME ANALYZED: 957
TRANSFUSION STATUS: NORMAL
TRANSFUSION STATUS: NORMAL
TSH SERPL DL<=0.05 MIU/L-ACNC: 0.26 UIU/ML (ref 0.27–4.2)
UNIT DIVISION: 0
UNIT DIVISION: 0
UNIT ISSUE DATE/TIME: NORMAL
UNIT ISSUE DATE/TIME: NORMAL
VT MECHANICAL: 500 ML
WBC # BLD: ABNORMAL 10*3/UL
WBC # BLD: ABNORMAL 10*3/UL
WBC OTHER # BLD: 16 K/UL (ref 4.5–11.5)
WBC OTHER # BLD: 17 K/UL (ref 4.5–11.5)
WBC OTHER # BLD: 17.4 K/UL (ref 4.5–11.5)
WBC OTHER # BLD: 17.8 K/UL (ref 4.5–11.5)
WBC OTHER # BLD: 18.7 K/UL (ref 4.5–11.5)

## 2024-10-26 PROCEDURE — 84100 ASSAY OF PHOSPHORUS: CPT

## 2024-10-26 PROCEDURE — 82550 ASSAY OF CK (CPK): CPT

## 2024-10-26 PROCEDURE — 90935 HEMODIALYSIS ONE EVALUATION: CPT

## 2024-10-26 PROCEDURE — 84436 ASSAY OF TOTAL THYROXINE: CPT

## 2024-10-26 PROCEDURE — 87340 HEPATITIS B SURFACE AG IA: CPT

## 2024-10-26 PROCEDURE — 86317 IMMUNOASSAY INFECTIOUS AGENT: CPT

## 2024-10-26 PROCEDURE — 82962 GLUCOSE BLOOD TEST: CPT

## 2024-10-26 PROCEDURE — 83605 ASSAY OF LACTIC ACID: CPT

## 2024-10-26 PROCEDURE — 6370000000 HC RX 637 (ALT 250 FOR IP)

## 2024-10-26 PROCEDURE — 2000000000 HC ICU R&B

## 2024-10-26 PROCEDURE — 80053 COMPREHEN METABOLIC PANEL: CPT

## 2024-10-26 PROCEDURE — 32551 INSERTION OF CHEST TUBE: CPT

## 2024-10-26 PROCEDURE — 2580000003 HC RX 258

## 2024-10-26 PROCEDURE — 5A1D70Z PERFORMANCE OF URINARY FILTRATION, INTERMITTENT, LESS THAN 6 HOURS PER DAY: ICD-10-PCS | Performed by: STUDENT IN AN ORGANIZED HEALTH CARE EDUCATION/TRAINING PROGRAM

## 2024-10-26 PROCEDURE — 94003 VENT MGMT INPAT SUBQ DAY: CPT

## 2024-10-26 PROCEDURE — 82330 ASSAY OF CALCIUM: CPT

## 2024-10-26 PROCEDURE — 85025 COMPLETE CBC W/AUTO DIFF WBC: CPT

## 2024-10-26 PROCEDURE — 71045 X-RAY EXAM CHEST 1 VIEW: CPT

## 2024-10-26 PROCEDURE — 05H633Z INSERTION OF INFUSION DEVICE INTO LEFT SUBCLAVIAN VEIN, PERCUTANEOUS APPROACH: ICD-10-PCS | Performed by: STUDENT IN AN ORGANIZED HEALTH CARE EDUCATION/TRAINING PROGRAM

## 2024-10-26 PROCEDURE — 83735 ASSAY OF MAGNESIUM: CPT

## 2024-10-26 PROCEDURE — 2500000003 HC RX 250 WO HCPCS

## 2024-10-26 PROCEDURE — 37799 UNLISTED PX VASCULAR SURGERY: CPT

## 2024-10-26 PROCEDURE — 32551 INSERTION OF CHEST TUBE: CPT | Performed by: STUDENT IN AN ORGANIZED HEALTH CARE EDUCATION/TRAINING PROGRAM

## 2024-10-26 PROCEDURE — 0W9930Z DRAINAGE OF RIGHT PLEURAL CAVITY WITH DRAINAGE DEVICE, PERCUTANEOUS APPROACH: ICD-10-PCS

## 2024-10-26 PROCEDURE — 6360000002 HC RX W HCPCS

## 2024-10-26 PROCEDURE — 82805 BLOOD GASES W/O2 SATURATION: CPT

## 2024-10-26 PROCEDURE — 99291 CRITICAL CARE FIRST HOUR: CPT | Performed by: SURGERY

## 2024-10-26 PROCEDURE — 05HM33Z INSERTION OF INFUSION DEVICE INTO RIGHT INTERNAL JUGULAR VEIN, PERCUTANEOUS APPROACH: ICD-10-PCS

## 2024-10-26 PROCEDURE — 36556 INSERT NON-TUNNEL CV CATH: CPT

## 2024-10-26 PROCEDURE — 0JH63XZ INSERTION OF TUNNELED VASCULAR ACCESS DEVICE INTO CHEST SUBCUTANEOUS TISSUE AND FASCIA, PERCUTANEOUS APPROACH: ICD-10-PCS

## 2024-10-26 RX ORDER — SODIUM CHLORIDE, SODIUM LACTATE, POTASSIUM CHLORIDE, AND CALCIUM CHLORIDE .6; .31; .03; .02 G/100ML; G/100ML; G/100ML; G/100ML
1000 INJECTION, SOLUTION INTRAVENOUS ONCE
Status: COMPLETED | OUTPATIENT
Start: 2024-10-26 | End: 2024-10-26

## 2024-10-26 RX ORDER — SODIUM CHLORIDE 0.9 % (FLUSH) 0.9 %
1.1-1.9 SYRINGE (ML) INJECTION PRN
Status: DISCONTINUED | OUTPATIENT
Start: 2024-10-26 | End: 2024-11-14 | Stop reason: HOSPADM

## 2024-10-26 RX ORDER — WATER 10 ML/10ML
INJECTION INTRAMUSCULAR; INTRAVENOUS; SUBCUTANEOUS
Status: COMPLETED
Start: 2024-10-26 | End: 2024-10-26

## 2024-10-26 RX ORDER — SODIUM CHLORIDE 0.9 % (FLUSH) 0.9 %
5-40 SYRINGE (ML) INJECTION EVERY 12 HOURS SCHEDULED
Status: DISCONTINUED | OUTPATIENT
Start: 2024-10-26 | End: 2024-11-06

## 2024-10-26 RX ORDER — SODIUM CHLORIDE 0.9 % (FLUSH) 0.9 %
5-40 SYRINGE (ML) INJECTION PRN
Status: DISCONTINUED | OUTPATIENT
Start: 2024-10-26 | End: 2024-11-06

## 2024-10-26 RX ORDER — CALCIUM CHLORIDE, MAGNESIUM CHLORIDE, DEXTROSE MONOHYDRATE, LACTIC ACID, SODIUM CHLORIDE, SODIUM BICARBONATE AND POTASSIUM CHLORIDE 5.15; 2.03; 22; 5.4; 6.46; 3.09; .157 G/L; G/L; G/L; G/L; G/L; G/L; G/L
INJECTION INTRAVENOUS CONTINUOUS
Status: DISCONTINUED | OUTPATIENT
Start: 2024-10-26 | End: 2024-11-02

## 2024-10-26 RX ORDER — SODIUM CHLORIDE 9 MG/ML
INJECTION, SOLUTION INTRAVENOUS CONTINUOUS
Status: DISCONTINUED | OUTPATIENT
Start: 2024-10-26 | End: 2024-10-27

## 2024-10-26 RX ADMIN — INSULIN LISPRO 1 UNITS: 100 INJECTION, SOLUTION INTRAVENOUS; SUBCUTANEOUS at 05:31

## 2024-10-26 RX ADMIN — POLYETHYLENE GLYCOL 3350 17 G: 17 POWDER, FOR SOLUTION ORAL at 08:15

## 2024-10-26 RX ADMIN — PROPOFOL 40 MCG/KG/MIN: 10 INJECTION, EMULSION INTRAVENOUS at 22:31

## 2024-10-26 RX ADMIN — MINERAL OIL, WHITE PETROLATUM: .03; .94 OINTMENT OPHTHALMIC at 18:34

## 2024-10-26 RX ADMIN — CALCIUM GLUCONATE 2000 MG: 98 INJECTION, SOLUTION INTRAVENOUS at 10:41

## 2024-10-26 RX ADMIN — ACETAMINOPHEN 650 MG: 325 TABLET ORAL at 11:59

## 2024-10-26 RX ADMIN — HYDROCORTISONE SODIUM SUCCINATE 50 MG: 100 INJECTION, POWDER, FOR SOLUTION INTRAMUSCULAR; INTRAVENOUS at 00:44

## 2024-10-26 RX ADMIN — WATER 2 ML: 1 INJECTION INTRAMUSCULAR; INTRAVENOUS; SUBCUTANEOUS at 00:45

## 2024-10-26 RX ADMIN — SODIUM CHLORIDE, PRESERVATIVE FREE 10 ML: 5 INJECTION INTRAVENOUS at 00:45

## 2024-10-26 RX ADMIN — Medication 5 MCG/MIN: at 23:06

## 2024-10-26 RX ADMIN — GABAPENTIN 300 MG: 300 CAPSULE ORAL at 18:34

## 2024-10-26 RX ADMIN — METHOCARBAMOL 1000 MG: 500 TABLET ORAL at 08:15

## 2024-10-26 RX ADMIN — ACETAMINOPHEN 650 MG: 325 TABLET ORAL at 18:20

## 2024-10-26 RX ADMIN — INSULIN LISPRO 1 UNITS: 100 INJECTION, SOLUTION INTRAVENOUS; SUBCUTANEOUS at 22:39

## 2024-10-26 RX ADMIN — GABAPENTIN 300 MG: 300 CAPSULE ORAL at 09:57

## 2024-10-26 RX ADMIN — METHOCARBAMOL 1000 MG: 500 TABLET ORAL at 21:16

## 2024-10-26 RX ADMIN — SODIUM CHLORIDE, PRESERVATIVE FREE 20 MG: 5 INJECTION INTRAVENOUS at 20:15

## 2024-10-26 RX ADMIN — CHLORHEXIDINE GLUCONATE, 0.12% ORAL RINSE 15 ML: 1.2 SOLUTION DENTAL at 20:09

## 2024-10-26 RX ADMIN — Medication 50 MCG: at 14:51

## 2024-10-26 RX ADMIN — PROPOFOL 40 MCG/KG/MIN: 10 INJECTION, EMULSION INTRAVENOUS at 11:23

## 2024-10-26 RX ADMIN — MINERAL OIL, WHITE PETROLATUM: .03; .94 OINTMENT OPHTHALMIC at 20:09

## 2024-10-26 RX ADMIN — CHLORHEXIDINE GLUCONATE, 0.12% ORAL RINSE 15 ML: 1.2 SOLUTION DENTAL at 08:00

## 2024-10-26 RX ADMIN — INSULIN LISPRO 1 UNITS: 100 INJECTION, SOLUTION INTRAVENOUS; SUBCUTANEOUS at 18:19

## 2024-10-26 RX ADMIN — Medication 150 MCG/HR: at 06:57

## 2024-10-26 RX ADMIN — HYDROCORTISONE SODIUM SUCCINATE 50 MG: 100 INJECTION, POWDER, FOR SOLUTION INTRAMUSCULAR; INTRAVENOUS at 11:59

## 2024-10-26 RX ADMIN — Medication 150 MCG/HR: at 16:53

## 2024-10-26 RX ADMIN — PROPOFOL 45 MCG/KG/MIN: 10 INJECTION, EMULSION INTRAVENOUS at 05:23

## 2024-10-26 RX ADMIN — SODIUM CHLORIDE, POTASSIUM CHLORIDE, SODIUM LACTATE AND CALCIUM CHLORIDE: 600; 310; 30; 20 INJECTION, SOLUTION INTRAVENOUS at 16:42

## 2024-10-26 RX ADMIN — PROPOFOL 40 MCG/KG/MIN: 10 INJECTION, EMULSION INTRAVENOUS at 14:38

## 2024-10-26 RX ADMIN — SODIUM CHLORIDE, PRESERVATIVE FREE 10 ML: 5 INJECTION INTRAVENOUS at 21:45

## 2024-10-26 RX ADMIN — SODIUM CHLORIDE, PRESERVATIVE FREE 10 ML: 5 INJECTION INTRAVENOUS at 23:18

## 2024-10-26 RX ADMIN — SODIUM CHLORIDE, PRESERVATIVE FREE 20 MG: 5 INJECTION INTRAVENOUS at 08:00

## 2024-10-26 RX ADMIN — WATER 2 ML: 1 INJECTION INTRAMUSCULAR; INTRAVENOUS; SUBCUTANEOUS at 23:18

## 2024-10-26 RX ADMIN — PROPOFOL 40 MCG/KG/MIN: 10 INJECTION, EMULSION INTRAVENOUS at 16:57

## 2024-10-26 RX ADMIN — ACETAMINOPHEN 650 MG: 325 TABLET ORAL at 23:08

## 2024-10-26 RX ADMIN — HYDROCORTISONE SODIUM SUCCINATE 50 MG: 100 INJECTION, POWDER, FOR SOLUTION INTRAMUSCULAR; INTRAVENOUS at 18:20

## 2024-10-26 RX ADMIN — PROPOFOL 40 MCG/KG/MIN: 10 INJECTION, EMULSION INTRAVENOUS at 02:44

## 2024-10-26 RX ADMIN — Medication 150 MCG/HR: at 14:21

## 2024-10-26 RX ADMIN — HYDROCORTISONE SODIUM SUCCINATE 50 MG: 100 INJECTION, POWDER, FOR SOLUTION INTRAMUSCULAR; INTRAVENOUS at 23:18

## 2024-10-26 RX ADMIN — SODIUM CHLORIDE, POTASSIUM CHLORIDE, SODIUM LACTATE AND CALCIUM CHLORIDE 1000 ML: 600; 310; 30; 20 INJECTION, SOLUTION INTRAVENOUS at 01:16

## 2024-10-26 RX ADMIN — SODIUM CHLORIDE, PRESERVATIVE FREE 20 ML: 5 INJECTION INTRAVENOUS at 20:16

## 2024-10-26 RX ADMIN — MINERAL OIL, WHITE PETROLATUM: .03; .94 OINTMENT OPHTHALMIC at 05:24

## 2024-10-26 RX ADMIN — MINERAL OIL, WHITE PETROLATUM: .03; .94 OINTMENT OPHTHALMIC at 14:37

## 2024-10-26 RX ADMIN — METHOCARBAMOL 1000 MG: 500 TABLET ORAL at 11:59

## 2024-10-26 RX ADMIN — HYDROCORTISONE SODIUM SUCCINATE 50 MG: 100 INJECTION, POWDER, FOR SOLUTION INTRAMUSCULAR; INTRAVENOUS at 05:24

## 2024-10-26 RX ADMIN — SODIUM CHLORIDE, POTASSIUM CHLORIDE, SODIUM LACTATE AND CALCIUM CHLORIDE 1000 ML: 600; 310; 30; 20 INJECTION, SOLUTION INTRAVENOUS at 05:32

## 2024-10-26 RX ADMIN — SODIUM CHLORIDE, POTASSIUM CHLORIDE, SODIUM LACTATE AND CALCIUM CHLORIDE: 600; 310; 30; 20 INJECTION, SOLUTION INTRAVENOUS at 08:22

## 2024-10-26 RX ADMIN — PROPOFOL 40 MCG/KG/MIN: 10 INJECTION, EMULSION INTRAVENOUS at 08:17

## 2024-10-26 RX ADMIN — Medication 150 MCG/HR: at 19:18

## 2024-10-26 RX ADMIN — INSULIN LISPRO 1 UNITS: 100 INJECTION, SOLUTION INTRAVENOUS; SUBCUTANEOUS at 09:57

## 2024-10-26 RX ADMIN — WATER 2 ML: 1 INJECTION INTRAMUSCULAR; INTRAVENOUS; SUBCUTANEOUS at 05:24

## 2024-10-26 RX ADMIN — SODIUM CHLORIDE, PRESERVATIVE FREE 10 ML: 5 INJECTION INTRAVENOUS at 05:24

## 2024-10-26 RX ADMIN — INSULIN LISPRO 1 UNITS: 100 INJECTION, SOLUTION INTRAVENOUS; SUBCUTANEOUS at 14:44

## 2024-10-26 RX ADMIN — PROPOFOL 40 MCG/KG/MIN: 10 INJECTION, EMULSION INTRAVENOUS at 20:03

## 2024-10-26 RX ADMIN — MINERAL OIL, WHITE PETROLATUM: .03; .94 OINTMENT OPHTHALMIC at 08:15

## 2024-10-26 RX ADMIN — METHOCARBAMOL 1000 MG: 500 TABLET ORAL at 18:23

## 2024-10-26 RX ADMIN — SODIUM CHLORIDE, PRESERVATIVE FREE 10 ML: 5 INJECTION INTRAVENOUS at 08:00

## 2024-10-26 RX ADMIN — MINERAL OIL, WHITE PETROLATUM: .03; .94 OINTMENT OPHTHALMIC at 00:45

## 2024-10-26 ASSESSMENT — PULMONARY FUNCTION TESTS
PIF_VALUE: 37
PIF_VALUE: 30
PIF_VALUE: 27
PIF_VALUE: 28
PIF_VALUE: 35
PIF_VALUE: 29
PIF_VALUE: 25
PIF_VALUE: 31
PIF_VALUE: 34
PIF_VALUE: 28
PIF_VALUE: 27
PIF_VALUE: 30
PIF_VALUE: 28
PIF_VALUE: 27
PIF_VALUE: 26
PIF_VALUE: 45
PIF_VALUE: 28
PIF_VALUE: 27
PIF_VALUE: 28
PIF_VALUE: 29
PIF_VALUE: 25
PIF_VALUE: 30
PIF_VALUE: 25
PIF_VALUE: 27
PIF_VALUE: 30
PIF_VALUE: 28
PIF_VALUE: 33
PIF_VALUE: 27
PIF_VALUE: 26
PIF_VALUE: 31
PIF_VALUE: 30
PIF_VALUE: 26
PIF_VALUE: 28
PIF_VALUE: 33
PIF_VALUE: 32
PIF_VALUE: 45
PIF_VALUE: 27

## 2024-10-26 NOTE — FLOWSHEET NOTE
Dialysis ready for pt, still no access, floor to call once placed, delay of treatment starting now

## 2024-10-26 NOTE — PROCEDURES
PROCEDURE NOTE  Date: 10/26/2024   Name: Glenroy Simmons II  YOB: 1975    Chest Tube Insertion    Date/Time: 10/26/2024 5:34 PM    Performed by: Tabitha Loredo DO  Authorized by: Yazan Schaffer MD  Consent: Written consent obtained.  Consent given by: power of   Indications: hemothorax    Sedation:  Patient sedated: yes  Sedatives: propofol  Analgesia: fentanyl      Anesthesia:  Local Anesthetic: lidocaine 1% with epinephrine  Preparation: skin prepped with chlorhexidine  Placement location: right lateral  Scalpel size: 11  Tube size: 32 Burmese  Dissection instrument: finger and Iris clamp  Tube connected to: suction  Drainage characteristics: bloody and cloudy  Suture material: 0 silk  Dressing: Xeroform gauze and 4x4 sterile gauze  Post-insertion x-ray findings: tube in good position  Comments: Dr. Schaffer was available for the procedure

## 2024-10-26 NOTE — PROCEDURES
PROCEDURE NOTE  Date: 10/26/2024   Name: Glenroy Simmons II  YOB: 1975    CENTRAL LINE    Date/Time: 10/26/2024 5:38 PM    Performed by: Tabitha Loredo DO  Authorized by: Yazan Schaffer MD  Consent: Written consent obtained.  Consent given by: power of   Indications: vascular access    Anesthesia:  Local Anesthetic: lidocaine 1% with epinephrine  Preparation: skin prepped with 2% chlorhexidine  Location details: left subclavian  Patient position: reverse Trendelenburg  Catheter type: triple lumen  Catheter size: 7 Fr  Post-procedure: line sutured and dressing applied  Assessment: blood return through all ports  Patient tolerance: patient tolerated the procedure well with no immediate complications  Comments: CXR confirmed position    Dr. Schaffer was available for the procedure
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
## 2024-10-26 NOTE — PLAN OF CARE
Problem: Respiratory - Adult  Goal: Achieves optimal ventilation and oxygenation  10/26/2024 1213 by Seda Alves, RN  Outcome: Progressing  10/26/2024 0623 by Radha Loomis, RN  Outcome: Not Progressing  Flowsheets  Taken 10/26/2024 0623  Achieves optimal ventilation and oxygenation:   Assess for changes in respiratory status   Assess for changes in mentation and behavior   Position to facilitate oxygenation and minimize respiratory effort   Oxygen supplementation based on oxygen saturation or arterial blood gases   Initiate smoking cessation protocol as indicated   Encourage broncho-pulmonary hygiene including cough, deep breathe, incentive spirometry   Assess the need for suctioning and aspirate as needed   Assess and instruct to report shortness of breath or any respiratory difficulty   Respiratory therapy support as indicated  Taken 10/25/2024 2000  Achieves optimal ventilation and oxygenation:   Assess for changes in respiratory status   Assess for changes in mentation and behavior   Position to facilitate oxygenation and minimize respiratory effort   Oxygen supplementation based on oxygen saturation or arterial blood gases   Initiate smoking cessation protocol as indicated   Encourage broncho-pulmonary hygiene including cough, deep breathe, incentive spirometry   Assess the need for suctioning and aspirate as needed   Assess and instruct to report shortness of breath or any respiratory difficulty   Respiratory therapy support as indicated  Note: Pt continues to have periods of hypoxia

## 2024-10-26 NOTE — FLOWSHEET NOTE
Patient will reach for lines and tubes, needing to be redirected much of the time. Attempting to provide calm environment, but patient still assessed to be a risk of harm to self. Family aware for need for restraints for safety. Will continue to monitor for earliest removal capability.

## 2024-10-26 NOTE — PROCEDURES
PROCEDURE NOTE  Date: 10/26/2024   Name: Glenroy Simmons II  YOB: 1975    Guidewire exchange CVC for TDC    Date/Time: 10/26/2024 5:39 PM    Performed by: Tabitha Loredo DO  Authorized by: Yazan Schaffer MD  Consent: Written consent obtained.  Consent given by: power of   Indications: vascular access  Preparation: skin prepped with 2% chlorhexidine  Location details: right internal jugular  Patient position: reverse Trendelenburg  Catheter type: double lumen  Catheter size: 14 Fr  Post-procedure: line sutured and dressing applied  Assessment: placement verified by x-ray  Patient tolerance: patient tolerated the procedure well with no immediate complications  Comments: Okay for dialysis    Dr. Schaffer was available for the procedure
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
## 2024-10-26 NOTE — PLAN OF CARE
Problem: Respiratory - Adult  Goal: Achieves optimal ventilation and oxygenation  Outcome: Not Progressing  Flowsheets (Taken 10/26/2024 0623)  Achieves optimal ventilation and oxygenation:   Assess for changes in respiratory status   Assess for changes in mentation and behavior   Position to facilitate oxygenation and minimize respiratory effort   Oxygen supplementation based on oxygen saturation or arterial blood gases   Initiate smoking cessation protocol as indicated   Encourage broncho-pulmonary hygiene including cough, deep breathe, incentive spirometry   Assess the need for suctioning and aspirate as needed   Assess and instruct to report shortness of breath or any respiratory difficulty   Respiratory therapy support as indicated  Note: Pt continues to have periods of hypoxia      Problem: Discharge Planning  Goal: Discharge to home or other facility with appropriate resources  Outcome: Progressing     Problem: Pain  Goal: Verbalizes/displays adequate comfort level or baseline comfort level  Outcome: Progressing  Flowsheets (Taken 10/25/2024 2000)  Verbalizes/displays adequate comfort level or baseline comfort level:   Encourage patient to monitor pain and request assistance   Assess pain using appropriate pain scale   Administer analgesics based on type and severity of pain and evaluate response   Implement non-pharmacological measures as appropriate and evaluate response   Consider cultural and social influences on pain and pain management   Notify Licensed Independent Practitioner if interventions unsuccessful or patient reports new pain     Problem: Safety - Adult  Goal: Free from fall injury  Outcome: Progressing     Problem: Skin/Tissue Integrity  Goal: Absence of new skin breakdown  Description: 1.  Monitor for areas of redness and/or skin breakdown  2.  Assess vascular access sites hourly  3.  Every 4-6 hours minimum:  Change oxygen saturation probe site  4.  Every 4-6 hours:  If on nasal continuous  positive airway pressure, respiratory therapy assess nares and determine need for appliance change or resting period.  Outcome: Progressing     Problem: ABCDS Injury Assessment  Goal: Absence of physical injury  Outcome: Progressing     Problem: Safety - Medical Restraint  Goal: Remains free of injury from restraints (Restraint for Interference with Medical Device)  Description: INTERVENTIONS:  1. Determine that other, less restrictive measures have been tried or would not be effective before applying the restraint  2. Evaluate the patient's condition at the time of restraint application  3. Inform patient/family regarding the reason for restraint  4. Q2H: Monitor safety, psychosocial status, comfort, nutrition and hydration  Outcome: Progressing  Flowsheets  Taken 10/26/2024 0600  Remains free of injury from restraints (restraint for interference with medical device): Every 2 hours: Monitor safety, psychosocial status, comfort, nutrition and hydration  Taken 10/26/2024 0400  Remains free of injury from restraints (restraint for interference with medical device): Every 2 hours: Monitor safety, psychosocial status, comfort, nutrition and hydration  Taken 10/26/2024 0200  Remains free of injury from restraints (restraint for interference with medical device): Every 2 hours: Monitor safety, psychosocial status, comfort, nutrition and hydration  Taken 10/26/2024 0000  Remains free of injury from restraints (restraint for interference with medical device): Every 2 hours: Monitor safety, psychosocial status, comfort, nutrition and hydration  Taken 10/25/2024 2200  Remains free of injury from restraints (restraint for interference with medical device): Every 2 hours: Monitor safety, psychosocial status, comfort, nutrition and hydration  Taken 10/25/2024 2000  Remains free of injury from restraints (restraint for interference with medical device):   Determine that other, less restrictive measures have been tried or would

## 2024-10-26 NOTE — PROGRESS NOTES
2140 - Dr. Funes at bedside. RN notified MD of Pt O2 saturation decreased to 84% spontaneously and that patient had several similar episodes during day shift. Pt on 80% FiO2. Breath sounds present in all lobes, RN suctioned patient without improvement. Dr. Funes increased Patient's FiO2 to 90% then to 100% FiO2. Per MD, if O2 sat does not improve, increased FiO2 back to 100%. RN notified MD of continued Low urine output - see flowsheet. Per Dr. Funes, do not turn patient d/t increased hemodynamic instability and hypoxia. See new orders.    2340 - Dr. Funes at bedside. RN notified MD of continued decreased urine output, increased brown sediment in urine and increase in levophed drip rate d/t hypotension. See new orders.     Multiple fluid boluses given overnight for decreased urine output.

## 2024-10-26 NOTE — FLOWSHEET NOTE
Pt in bilateral soft wrist restraints. Pt reaches and pulls at lines and tubes. Less restrictive measures ineffective. Continue bilateral soft wrist restraints for safety.

## 2024-10-26 NOTE — PROGRESS NOTES
10/26/24 0815   Patient Observation   Pulse (!) 122   Respirations 20   SpO2 94 %   Vent Information   Ventilator ID [id number]   Vent Mode AC/VC   Ventilator Settings   FiO2  90 %   Vt (Set, mL) 500 mL   Resp Rate (Set) 16 bpm   PEEP/CPAP (cmH2O) 10   Peak Inspiratory Flow (Set) 70 L/sec   Vent Patient Data (Readings)   Vt (Measured) 529 mL   Peak Inspiratory Pressure (cmH2O) 26 cmH2O   Rate Measured 20 br/min   Minute Volume (L/min) 10.4 Liters   Peak Inspiratory Flow (lpm) 70 L/sec   Mean Airway Pressure (cmH2O) 14 cmH20   Plateau Pressure (cm H2O) 31 cm H2O   Driving Pressure 21   I:E Ratio 1:2.90   Flow Sensitivity 2 L/min   Static Compliance (L/cm H2O) 18   Backup Apnea On   Backup Rate 16 Breaths Per Minute   Backup Vt 500   Vent Alarm Settings   High Pressure (cmH2O) 50 cmH2O   Low Minute Volume (lpm) 7 L/min   High Minute Volume (lpm) 22 L/min   Low Exhaled Vt (ml) 400 mL   High Exhaled Vt (ml) 900 mL   RR High (bpm) 35 br/min   Apnea (secs) 20 secs   Additional Respiratoray Assessments   Humidification Source Heated wire   Humidification Temp 36.8   Ambu Bag With Mask At Bedside Yes   ETT    Placement Date/Time: 10/24/24 1925   Present on Admission/Arrival: No  Placed By: (c)   Placement Verified By: Auscultation;Chest X-ray  Preoxygenation: Yes  Mask Ventilation: Ventilated by mask (1)  Technique: Video laryngoscopy  Airway Type: Cuffed ...   Secured At 25 cm   Measured From Teeth   ETT Placement Center   Secured By Commercial tube rebolledo   Site Assessment Edema

## 2024-10-27 ENCOUNTER — APPOINTMENT (OUTPATIENT)
Dept: GENERAL RADIOLOGY | Age: 49
End: 2024-10-27
Payer: MEDICARE

## 2024-10-27 ENCOUNTER — APPOINTMENT (OUTPATIENT)
Dept: ULTRASOUND IMAGING | Age: 49
End: 2024-10-27
Payer: MEDICARE

## 2024-10-27 LAB
AADO2: 291.7 MMHG
AADO2: 309.1 MMHG
AADO2: 368.3 MMHG
AADO2: 371.5 MMHG
ALBUMIN SERPL-MCNC: 2.5 G/DL (ref 3.5–5.2)
ALBUMIN SERPL-MCNC: 2.5 G/DL (ref 3.5–5.2)
ALBUMIN SERPL-MCNC: 2.6 G/DL (ref 3.5–5.2)
ALBUMIN SERPL-MCNC: 2.7 G/DL (ref 3.5–5.2)
ALP SERPL-CCNC: 110 U/L (ref 40–129)
ALP SERPL-CCNC: 120 U/L (ref 40–129)
ALP SERPL-CCNC: 121 U/L (ref 40–129)
ALP SERPL-CCNC: 123 U/L (ref 40–129)
ALT SERPL-CCNC: 1031 U/L (ref 0–40)
ALT SERPL-CCNC: 1057 U/L (ref 0–40)
ALT SERPL-CCNC: 957 U/L (ref 0–40)
ALT SERPL-CCNC: 974 U/L (ref 0–40)
ANION GAP SERPL CALCULATED.3IONS-SCNC: 10 MMOL/L (ref 7–16)
ANION GAP SERPL CALCULATED.3IONS-SCNC: 10 MMOL/L (ref 7–16)
ANION GAP SERPL CALCULATED.3IONS-SCNC: 7 MMOL/L (ref 7–16)
ANION GAP SERPL CALCULATED.3IONS-SCNC: 8 MMOL/L (ref 7–16)
AST SERPL-CCNC: 1029 U/L (ref 0–39)
AST SERPL-CCNC: 1169 U/L (ref 0–39)
AST SERPL-CCNC: 1212 U/L (ref 0–39)
AST SERPL-CCNC: 1354 U/L (ref 0–39)
B.E.: -1.1 MMOL/L (ref -3–3)
B.E.: 0.3 MMOL/L (ref -3–3)
B.E.: 1.9 MMOL/L (ref -3–3)
B.E.: 2.4 MMOL/L (ref -3–3)
BASOPHILS # BLD: 0 K/UL (ref 0–0.2)
BASOPHILS # BLD: 0.03 K/UL (ref 0–0.2)
BASOPHILS NFR BLD: 0 % (ref 0–2)
BILIRUB SERPL-MCNC: 0.6 MG/DL (ref 0–1.2)
BILIRUB SERPL-MCNC: 0.6 MG/DL (ref 0–1.2)
BILIRUB SERPL-MCNC: 0.7 MG/DL (ref 0–1.2)
BILIRUB SERPL-MCNC: 0.8 MG/DL (ref 0–1.2)
BUN SERPL-MCNC: 35 MG/DL (ref 6–20)
BUN SERPL-MCNC: 39 MG/DL (ref 6–20)
BUN SERPL-MCNC: 40 MG/DL (ref 6–20)
BUN SERPL-MCNC: 42 MG/DL (ref 6–20)
CA-I BLD-SCNC: 1.08 MMOL/L (ref 1.15–1.33)
CA-I BLD-SCNC: 1.1 MMOL/L (ref 1.15–1.33)
CA-I BLD-SCNC: 1.12 MMOL/L (ref 1.15–1.33)
CA-I BLD-SCNC: 1.12 MMOL/L (ref 1.15–1.33)
CALCIUM SERPL-MCNC: 7.3 MG/DL (ref 8.6–10.2)
CALCIUM SERPL-MCNC: 7.4 MG/DL (ref 8.6–10.2)
CALCIUM SERPL-MCNC: 7.5 MG/DL (ref 8.6–10.2)
CALCIUM SERPL-MCNC: 7.5 MG/DL (ref 8.6–10.2)
CHLORIDE SERPL-SCNC: 103 MMOL/L (ref 98–107)
CHLORIDE SERPL-SCNC: 105 MMOL/L (ref 98–107)
CHLORIDE SERPL-SCNC: 106 MMOL/L (ref 98–107)
CHLORIDE SERPL-SCNC: 106 MMOL/L (ref 98–107)
CK SERPL-CCNC: ABNORMAL U/L (ref 20–200)
CO2 SERPL-SCNC: 26 MMOL/L (ref 22–29)
CO2 SERPL-SCNC: 27 MMOL/L (ref 22–29)
CO2 SERPL-SCNC: 27 MMOL/L (ref 22–29)
CO2 SERPL-SCNC: 28 MMOL/L (ref 22–29)
COHB: 0 % (ref 0–1.5)
COHB: 0.3 % (ref 0–1.5)
COHB: 0.3 % (ref 0–1.5)
COHB: 3.9 % (ref 0–1.5)
CREAT SERPL-MCNC: 2.1 MG/DL (ref 0.7–1.2)
CREAT SERPL-MCNC: 2.2 MG/DL (ref 0.7–1.2)
CREAT SERPL-MCNC: 2.4 MG/DL (ref 0.7–1.2)
CREAT SERPL-MCNC: 2.4 MG/DL (ref 0.7–1.2)
CRITICAL: ABNORMAL
DATE ANALYZED: ABNORMAL
DATE OF COLLECTION: ABNORMAL
EOSINOPHIL # BLD: 0 K/UL (ref 0.05–0.5)
EOSINOPHIL # BLD: 0 K/UL (ref 0.05–0.5)
EOSINOPHIL # BLD: 0.21 K/UL (ref 0.05–0.5)
EOSINOPHIL # BLD: 0.42 K/UL (ref 0.05–0.5)
EOSINOPHILS RELATIVE PERCENT: 0 % (ref 0–6)
EOSINOPHILS RELATIVE PERCENT: 0 % (ref 0–6)
EOSINOPHILS RELATIVE PERCENT: 1 % (ref 0–6)
EOSINOPHILS RELATIVE PERCENT: 3 % (ref 0–6)
ERYTHROCYTE [DISTWIDTH] IN BLOOD BY AUTOMATED COUNT: 15.5 % (ref 11.5–15)
ERYTHROCYTE [DISTWIDTH] IN BLOOD BY AUTOMATED COUNT: 15.6 % (ref 11.5–15)
ERYTHROCYTE [DISTWIDTH] IN BLOOD BY AUTOMATED COUNT: 15.6 % (ref 11.5–15)
ERYTHROCYTE [DISTWIDTH] IN BLOOD BY AUTOMATED COUNT: 15.9 % (ref 11.5–15)
FIO2: 60 %
FIO2: 70 %
GFR, ESTIMATED: 32 ML/MIN/1.73M2
GFR, ESTIMATED: 32 ML/MIN/1.73M2
GFR, ESTIMATED: 36 ML/MIN/1.73M2
GFR, ESTIMATED: 37 ML/MIN/1.73M2
GLUCOSE BLD-MCNC: 161 MG/DL (ref 74–99)
GLUCOSE BLD-MCNC: 165 MG/DL (ref 74–99)
GLUCOSE BLD-MCNC: 190 MG/DL (ref 74–99)
GLUCOSE BLD-MCNC: 195 MG/DL (ref 74–99)
GLUCOSE BLD-MCNC: 203 MG/DL (ref 74–99)
GLUCOSE BLD-MCNC: 227 MG/DL (ref 74–99)
GLUCOSE SERPL-MCNC: 187 MG/DL (ref 74–99)
GLUCOSE SERPL-MCNC: 190 MG/DL (ref 74–99)
GLUCOSE SERPL-MCNC: 193 MG/DL (ref 74–99)
GLUCOSE SERPL-MCNC: 193 MG/DL (ref 74–99)
HCO3: 23.3 MMOL/L (ref 22–26)
HCO3: 25.1 MMOL/L (ref 22–26)
HCO3: 26.1 MMOL/L (ref 22–26)
HCO3: 27.5 MMOL/L (ref 22–26)
HCT VFR BLD AUTO: 20.4 % (ref 37–54)
HCT VFR BLD AUTO: 21.3 % (ref 37–54)
HCT VFR BLD AUTO: 23 % (ref 37–54)
HCT VFR BLD AUTO: 23.2 % (ref 37–54)
HGB BLD-MCNC: 6.5 G/DL (ref 12.5–16.5)
HGB BLD-MCNC: 7.1 G/DL (ref 12.5–16.5)
HGB BLD-MCNC: 7.6 G/DL (ref 12.5–16.5)
HGB BLD-MCNC: 7.8 G/DL (ref 12.5–16.5)
HHB: 1.7 % (ref 0–5)
HHB: 1.8 % (ref 0–5)
HHB: 4.1 % (ref 0–5)
HHB: 5 % (ref 0–5)
IMM GRANULOCYTES # BLD AUTO: 0.15 K/UL (ref 0–0.58)
IMM GRANULOCYTES NFR BLD: 1 % (ref 0–5)
LAB: ABNORMAL
LACTATE BLDV-SCNC: 1.9 MMOL/L (ref 0.5–2.2)
LYMPHOCYTES NFR BLD: 0.42 K/UL (ref 1.5–4)
LYMPHOCYTES NFR BLD: 0.68 K/UL (ref 1.5–4)
LYMPHOCYTES NFR BLD: 1.02 K/UL (ref 1.5–4)
LYMPHOCYTES NFR BLD: 1.07 K/UL (ref 1.5–4)
LYMPHOCYTES RELATIVE PERCENT: 3 % (ref 20–42)
LYMPHOCYTES RELATIVE PERCENT: 4 % (ref 20–42)
LYMPHOCYTES RELATIVE PERCENT: 6 % (ref 20–42)
LYMPHOCYTES RELATIVE PERCENT: 7 % (ref 20–42)
Lab: 1555
Lab: 2157
Lab: 437
Lab: 945
MAGNESIUM SERPL-MCNC: 1.9 MG/DL (ref 1.6–2.6)
MAGNESIUM SERPL-MCNC: 2 MG/DL (ref 1.6–2.6)
MAGNESIUM SERPL-MCNC: 2.1 MG/DL (ref 1.6–2.6)
MAGNESIUM SERPL-MCNC: 2.1 MG/DL (ref 1.6–2.6)
MCH RBC QN AUTO: 28.9 PG (ref 26–35)
MCH RBC QN AUTO: 29 PG (ref 26–35)
MCH RBC QN AUTO: 29.7 PG (ref 26–35)
MCH RBC QN AUTO: 30 PG (ref 26–35)
MCHC RBC AUTO-ENTMCNC: 31.9 G/DL (ref 32–34.5)
MCHC RBC AUTO-ENTMCNC: 32.8 G/DL (ref 32–34.5)
MCHC RBC AUTO-ENTMCNC: 33.3 G/DL (ref 32–34.5)
MCHC RBC AUTO-ENTMCNC: 33.9 G/DL (ref 32–34.5)
MCV RBC AUTO: 88.5 FL (ref 80–99.9)
MCV RBC AUTO: 88.5 FL (ref 80–99.9)
MCV RBC AUTO: 89.1 FL (ref 80–99.9)
MCV RBC AUTO: 90.7 FL (ref 80–99.9)
METHB: 0 % (ref 0–1.5)
METHB: 0.6 % (ref 0–1.5)
METHB: 0.6 % (ref 0–1.5)
METHB: 1.6 % (ref 0–1.5)
MODE: ABNORMAL
MODE: AC
MONOCYTES NFR BLD: 0.44 K/UL (ref 0.1–0.95)
MONOCYTES NFR BLD: 0.54 K/UL (ref 0.1–0.95)
MONOCYTES NFR BLD: 1.11 K/UL (ref 0.1–0.95)
MONOCYTES NFR BLD: 1.17 K/UL (ref 0.1–0.95)
MONOCYTES NFR BLD: 3 % (ref 2–12)
MONOCYTES NFR BLD: 4 % (ref 2–12)
MONOCYTES NFR BLD: 7 % (ref 2–12)
MONOCYTES NFR BLD: 8 % (ref 2–12)
NEUTROPHILS NFR BLD: 83 % (ref 43–80)
NEUTROPHILS NFR BLD: 88 % (ref 43–80)
NEUTROPHILS NFR BLD: 91 % (ref 43–80)
NEUTROPHILS NFR BLD: 92 % (ref 43–80)
NEUTS SEG NFR BLD: 12.48 K/UL (ref 1.8–7.3)
NEUTS SEG NFR BLD: 13.86 K/UL (ref 1.8–7.3)
NEUTS SEG NFR BLD: 14.18 K/UL (ref 1.8–7.3)
NEUTS SEG NFR BLD: 14.95 K/UL (ref 1.8–7.3)
NUCLEATED RED BLOOD CELLS: 2 PER 100 WBC
NUCLEATED RED BLOOD CELLS: 2 PER 100 WBC
O2 SATURATION: 95 % (ref 92–98.5)
O2 SATURATION: 95.8 % (ref 92–98.5)
O2 SATURATION: 98.2 % (ref 92–98.5)
O2 SATURATION: 98.2 % (ref 92–98.5)
O2HB: 94 % (ref 94–97)
O2HB: 94.1 % (ref 94–97)
O2HB: 94.4 % (ref 94–97)
O2HB: 97.6 % (ref 94–97)
OPERATOR ID: 1893
OPERATOR ID: 1893
OPERATOR ID: 2577
OPERATOR ID: 2577
PATIENT TEMP: 37 C
PCO2: 37.3 MMHG (ref 35–45)
PCO2: 39.1 MMHG (ref 35–45)
PCO2: 41.5 MMHG (ref 35–45)
PCO2: 45.4 MMHG (ref 35–45)
PEEP/CPAP: 10 CMH2O
PFO2: 1.13 MMHG/%
PFO2: 1.27 MMHG/%
PFO2: 1.51 MMHG/%
PFO2: 1.89 MMHG/%
PH BLOOD GAS: 7.4 (ref 7.35–7.45)
PH BLOOD GAS: 7.4 (ref 7.35–7.45)
PH BLOOD GAS: 7.41 (ref 7.35–7.45)
PH BLOOD GAS: 7.44 (ref 7.35–7.45)
PHOSPHATE SERPL-MCNC: 4.2 MG/DL (ref 2.5–4.5)
PHOSPHATE SERPL-MCNC: 4.3 MG/DL (ref 2.5–4.5)
PHOSPHATE SERPL-MCNC: 4.3 MG/DL (ref 2.5–4.5)
PHOSPHATE SERPL-MCNC: 4.4 MG/DL (ref 2.5–4.5)
PLATELET # BLD AUTO: 85 K/UL (ref 130–450)
PLATELET # BLD AUTO: 87 K/UL (ref 130–450)
PLATELET CONFIRMATION: NORMAL
PLATELET, FLUORESCENCE: 94 K/UL (ref 130–450)
PLATELET, FLUORESCENCE: 94 K/UL (ref 130–450)
PMV BLD AUTO: 11 FL (ref 7–12)
PMV BLD AUTO: 11.6 FL (ref 7–12)
PMV BLD AUTO: 12 FL (ref 7–12)
PMV BLD AUTO: 12.2 FL (ref 7–12)
PO2: 132.4 MMHG (ref 75–100)
PO2: 78.8 MMHG (ref 75–100)
PO2: 88.8 MMHG (ref 75–100)
PO2: 90.5 MMHG (ref 75–100)
POTASSIUM SERPL-SCNC: 4.3 MMOL/L (ref 3.5–5)
POTASSIUM SERPL-SCNC: 4.5 MMOL/L (ref 3.5–5)
POTASSIUM SERPL-SCNC: 4.7 MMOL/L (ref 3.5–5)
POTASSIUM SERPL-SCNC: 5 MMOL/L (ref 3.5–5)
PROT SERPL-MCNC: 4.2 G/DL (ref 6.4–8.3)
PROT SERPL-MCNC: 4.4 G/DL (ref 6.4–8.3)
PROT SERPL-MCNC: 4.6 G/DL (ref 6.4–8.3)
PROT SERPL-MCNC: 4.7 G/DL (ref 6.4–8.3)
RBC # BLD AUTO: 2.25 M/UL (ref 3.8–5.8)
RBC # BLD AUTO: 2.39 M/UL (ref 3.8–5.8)
RBC # BLD AUTO: 2.6 M/UL (ref 3.8–5.8)
RBC # BLD AUTO: 2.62 M/UL (ref 3.8–5.8)
RBC # BLD: ABNORMAL 10*6/UL
RI(T): 2.33
RI(T): 3.22
RI(T): 4.15
RI(T): 4.71
RR MECHANICAL: 16 B/MIN
SODIUM SERPL-SCNC: 140 MMOL/L (ref 132–146)
SODIUM SERPL-SCNC: 140 MMOL/L (ref 132–146)
SODIUM SERPL-SCNC: 141 MMOL/L (ref 132–146)
SODIUM SERPL-SCNC: 142 MMOL/L (ref 132–146)
SOURCE, BLOOD GAS: ABNORMAL
THB: 7.2 G/DL (ref 11.5–16.5)
THB: 7.7 G/DL (ref 11.5–16.5)
THB: 8.4 G/DL (ref 11.5–16.5)
THB: 8.5 G/DL (ref 11.5–16.5)
TIME ANALYZED: 1605
TIME ANALYZED: 2200
TIME ANALYZED: 439
TIME ANALYZED: 954
VT MECHANICAL: 500 ML
WBC # BLD: ABNORMAL 10*3/UL
WBC OTHER # BLD: 15.1 K/UL (ref 4.5–11.5)
WBC OTHER # BLD: 15.4 K/UL (ref 4.5–11.5)
WBC OTHER # BLD: 15.8 K/UL (ref 4.5–11.5)
WBC OTHER # BLD: 16.4 K/UL (ref 4.5–11.5)

## 2024-10-27 PROCEDURE — 6370000000 HC RX 637 (ALT 250 FOR IP)

## 2024-10-27 PROCEDURE — 2580000003 HC RX 258

## 2024-10-27 PROCEDURE — 82805 BLOOD GASES W/O2 SATURATION: CPT

## 2024-10-27 PROCEDURE — P9016 RBC LEUKOCYTES REDUCED: HCPCS

## 2024-10-27 PROCEDURE — 94003 VENT MGMT INPAT SUBQ DAY: CPT

## 2024-10-27 PROCEDURE — 2580000003 HC RX 258: Performed by: INTERNAL MEDICINE

## 2024-10-27 PROCEDURE — 80053 COMPREHEN METABOLIC PANEL: CPT

## 2024-10-27 PROCEDURE — 6370000000 HC RX 637 (ALT 250 FOR IP): Performed by: INTERNAL MEDICINE

## 2024-10-27 PROCEDURE — 71045 X-RAY EXAM CHEST 1 VIEW: CPT

## 2024-10-27 PROCEDURE — 99291 CRITICAL CARE FIRST HOUR: CPT | Performed by: SURGERY

## 2024-10-27 PROCEDURE — 36430 TRANSFUSION BLD/BLD COMPNT: CPT

## 2024-10-27 PROCEDURE — 76770 US EXAM ABDO BACK WALL COMP: CPT

## 2024-10-27 PROCEDURE — 83605 ASSAY OF LACTIC ACID: CPT

## 2024-10-27 PROCEDURE — 6360000002 HC RX W HCPCS: Performed by: INTERNAL MEDICINE

## 2024-10-27 PROCEDURE — 6360000002 HC RX W HCPCS

## 2024-10-27 PROCEDURE — 83735 ASSAY OF MAGNESIUM: CPT

## 2024-10-27 PROCEDURE — 84100 ASSAY OF PHOSPHORUS: CPT

## 2024-10-27 PROCEDURE — 82962 GLUCOSE BLOOD TEST: CPT

## 2024-10-27 PROCEDURE — 37799 UNLISTED PX VASCULAR SURGERY: CPT

## 2024-10-27 PROCEDURE — 82330 ASSAY OF CALCIUM: CPT

## 2024-10-27 PROCEDURE — 2500000003 HC RX 250 WO HCPCS

## 2024-10-27 PROCEDURE — 2000000000 HC ICU R&B

## 2024-10-27 PROCEDURE — 85025 COMPLETE CBC W/AUTO DIFF WBC: CPT

## 2024-10-27 PROCEDURE — 82550 ASSAY OF CK (CPK): CPT

## 2024-10-27 RX ORDER — WATER 10 ML/10ML
INJECTION INTRAMUSCULAR; INTRAVENOUS; SUBCUTANEOUS
Status: COMPLETED
Start: 2024-10-27 | End: 2024-10-27

## 2024-10-27 RX ORDER — SODIUM CHLORIDE 9 MG/ML
INJECTION, SOLUTION INTRAVENOUS PRN
Status: DISCONTINUED | OUTPATIENT
Start: 2024-10-27 | End: 2024-10-30

## 2024-10-27 RX ORDER — GABAPENTIN 600 MG/1
300 TABLET ORAL 2 TIMES DAILY
Status: DISCONTINUED | OUTPATIENT
Start: 2024-10-27 | End: 2024-10-27 | Stop reason: SDUPTHER

## 2024-10-27 RX ORDER — GABAPENTIN 300 MG/1
300 CAPSULE ORAL 2 TIMES DAILY
Status: DISCONTINUED | OUTPATIENT
Start: 2024-10-27 | End: 2024-11-05

## 2024-10-27 RX ADMIN — CALCIUM GLUCONATE 2000 MG: 98 INJECTION, SOLUTION INTRAVENOUS at 04:44

## 2024-10-27 RX ADMIN — INSULIN LISPRO 1 UNITS: 100 INJECTION, SOLUTION INTRAVENOUS; SUBCUTANEOUS at 09:48

## 2024-10-27 RX ADMIN — METHOCARBAMOL 1000 MG: 500 TABLET ORAL at 09:23

## 2024-10-27 RX ADMIN — Medication 150 MCG/HR: at 17:55

## 2024-10-27 RX ADMIN — ACETAMINOPHEN 650 MG: 325 TABLET ORAL at 17:46

## 2024-10-27 RX ADMIN — SODIUM CHLORIDE, PRESERVATIVE FREE 10 ML: 5 INJECTION INTRAVENOUS at 09:23

## 2024-10-27 RX ADMIN — METHOCARBAMOL 1000 MG: 500 TABLET ORAL at 13:05

## 2024-10-27 RX ADMIN — PROPOFOL 40 MCG/KG/MIN: 10 INJECTION, EMULSION INTRAVENOUS at 13:16

## 2024-10-27 RX ADMIN — CHLORHEXIDINE GLUCONATE, 0.12% ORAL RINSE 15 ML: 1.2 SOLUTION DENTAL at 09:22

## 2024-10-27 RX ADMIN — MINERAL OIL, WHITE PETROLATUM: .03; .94 OINTMENT OPHTHALMIC at 13:05

## 2024-10-27 RX ADMIN — PROPOFOL 40 MCG/KG/MIN: 10 INJECTION, EMULSION INTRAVENOUS at 07:57

## 2024-10-27 RX ADMIN — MINERAL OIL, WHITE PETROLATUM: .03; .94 OINTMENT OPHTHALMIC at 01:45

## 2024-10-27 RX ADMIN — ACETAMINOPHEN 650 MG: 325 TABLET ORAL at 05:51

## 2024-10-27 RX ADMIN — GABAPENTIN 300 MG: 300 CAPSULE ORAL at 21:36

## 2024-10-27 RX ADMIN — BUMETANIDE 0.5 MG/HR: 0.25 INJECTION INTRAMUSCULAR; INTRAVENOUS at 06:54

## 2024-10-27 RX ADMIN — MINERAL OIL, WHITE PETROLATUM: .03; .94 OINTMENT OPHTHALMIC at 09:22

## 2024-10-27 RX ADMIN — POLYETHYLENE GLYCOL 3350 17 G: 17 POWDER, FOR SOLUTION ORAL at 09:23

## 2024-10-27 RX ADMIN — CALCIUM GLUCONATE 2000 MG: 98 INJECTION, SOLUTION INTRAVENOUS at 21:17

## 2024-10-27 RX ADMIN — MINERAL OIL, WHITE PETROLATUM: .03; .94 OINTMENT OPHTHALMIC at 17:46

## 2024-10-27 RX ADMIN — PROPOFOL 40 MCG/KG/MIN: 10 INJECTION, EMULSION INTRAVENOUS at 20:17

## 2024-10-27 RX ADMIN — MINERAL OIL, WHITE PETROLATUM: .03; .94 OINTMENT OPHTHALMIC at 21:06

## 2024-10-27 RX ADMIN — PROPOFOL 40 MCG/KG/MIN: 10 INJECTION, EMULSION INTRAVENOUS at 16:37

## 2024-10-27 RX ADMIN — SODIUM CHLORIDE, PRESERVATIVE FREE 10 ML: 5 INJECTION INTRAVENOUS at 21:30

## 2024-10-27 RX ADMIN — Medication 150 MCG/HR: at 10:34

## 2024-10-27 RX ADMIN — PROPOFOL 40 MCG/KG/MIN: 10 INJECTION, EMULSION INTRAVENOUS at 22:47

## 2024-10-27 RX ADMIN — INSULIN LISPRO 1 UNITS: 100 INJECTION, SOLUTION INTRAVENOUS; SUBCUTANEOUS at 05:51

## 2024-10-27 RX ADMIN — INSULIN LISPRO 1 UNITS: 100 INJECTION, SOLUTION INTRAVENOUS; SUBCUTANEOUS at 18:02

## 2024-10-27 RX ADMIN — PROPOFOL 40 MCG/KG/MIN: 10 INJECTION, EMULSION INTRAVENOUS at 01:43

## 2024-10-27 RX ADMIN — SODIUM CHLORIDE, POTASSIUM CHLORIDE, SODIUM LACTATE AND CALCIUM CHLORIDE: 600; 310; 30; 20 INJECTION, SOLUTION INTRAVENOUS at 00:47

## 2024-10-27 RX ADMIN — PROPOFOL 40 MCG/KG/MIN: 10 INJECTION, EMULSION INTRAVENOUS at 10:28

## 2024-10-27 RX ADMIN — PROPOFOL 40 MCG/KG/MIN: 10 INJECTION, EMULSION INTRAVENOUS at 04:49

## 2024-10-27 RX ADMIN — HYDROCORTISONE SODIUM SUCCINATE 50 MG: 100 INJECTION, POWDER, FOR SOLUTION INTRAMUSCULAR; INTRAVENOUS at 13:05

## 2024-10-27 RX ADMIN — SODIUM CHLORIDE, PRESERVATIVE FREE 20 MG: 5 INJECTION INTRAVENOUS at 21:29

## 2024-10-27 RX ADMIN — Medication 5 MCG/MIN: at 06:18

## 2024-10-27 RX ADMIN — SODIUM CHLORIDE, PRESERVATIVE FREE 20 ML: 5 INJECTION INTRAVENOUS at 21:07

## 2024-10-27 RX ADMIN — GABAPENTIN 300 MG: 300 CAPSULE ORAL at 09:45

## 2024-10-27 RX ADMIN — ACETAMINOPHEN 650 MG: 325 TABLET ORAL at 13:05

## 2024-10-27 RX ADMIN — Medication 150 MCG/HR: at 02:51

## 2024-10-27 RX ADMIN — HYDROCORTISONE SODIUM SUCCINATE 50 MG: 100 INJECTION, POWDER, FOR SOLUTION INTRAMUSCULAR; INTRAVENOUS at 05:50

## 2024-10-27 RX ADMIN — INSULIN LISPRO 1 UNITS: 100 INJECTION, SOLUTION INTRAVENOUS; SUBCUTANEOUS at 15:50

## 2024-10-27 RX ADMIN — SODIUM CHLORIDE, PRESERVATIVE FREE 20 MG: 5 INJECTION INTRAVENOUS at 09:23

## 2024-10-27 RX ADMIN — METHOCARBAMOL 1000 MG: 500 TABLET ORAL at 17:46

## 2024-10-27 RX ADMIN — SODIUM CHLORIDE, POTASSIUM CHLORIDE, SODIUM LACTATE AND CALCIUM CHLORIDE: 600; 310; 30; 20 INJECTION, SOLUTION INTRAVENOUS at 17:39

## 2024-10-27 RX ADMIN — CHLORHEXIDINE GLUCONATE, 0.12% ORAL RINSE 15 ML: 1.2 SOLUTION DENTAL at 21:06

## 2024-10-27 RX ADMIN — MINERAL OIL, WHITE PETROLATUM: .03; .94 OINTMENT OPHTHALMIC at 05:16

## 2024-10-27 RX ADMIN — GABAPENTIN 300 MG: 300 CAPSULE ORAL at 01:45

## 2024-10-27 RX ADMIN — HYDROCORTISONE SODIUM SUCCINATE 50 MG: 100 INJECTION, POWDER, FOR SOLUTION INTRAMUSCULAR; INTRAVENOUS at 17:46

## 2024-10-27 RX ADMIN — SODIUM CHLORIDE, PRESERVATIVE FREE 10 ML: 5 INJECTION INTRAVENOUS at 05:51

## 2024-10-27 RX ADMIN — WATER 10 ML: 1 INJECTION INTRAMUSCULAR; INTRAVENOUS; SUBCUTANEOUS at 05:50

## 2024-10-27 RX ADMIN — METHOCARBAMOL 1000 MG: 500 TABLET ORAL at 21:29

## 2024-10-27 ASSESSMENT — PULMONARY FUNCTION TESTS
PIF_VALUE: 35
PIF_VALUE: 27
PIF_VALUE: 34
PIF_VALUE: 27
PIF_VALUE: 30
PIF_VALUE: 25
PIF_VALUE: 35
PIF_VALUE: 29
PIF_VALUE: 32
PIF_VALUE: 34
PIF_VALUE: 45
PIF_VALUE: 32
PIF_VALUE: 31
PIF_VALUE: 27
PIF_VALUE: 33
PIF_VALUE: 30
PIF_VALUE: 36
PIF_VALUE: 37
PIF_VALUE: 30
PIF_VALUE: 36
PIF_VALUE: 35
PIF_VALUE: 29
PIF_VALUE: 32
PIF_VALUE: 31
PIF_VALUE: 31
PIF_VALUE: 30
PIF_VALUE: 26
PIF_VALUE: 27
PIF_VALUE: 32
PIF_VALUE: 38
PIF_VALUE: 31
PIF_VALUE: 29
PIF_VALUE: 37
PIF_VALUE: 33
PIF_VALUE: 27
PIF_VALUE: 28
PIF_VALUE: 27
PIF_VALUE: 35

## 2024-10-27 ASSESSMENT — PAIN SCALES - GENERAL
PAINLEVEL_OUTOF10: 0

## 2024-10-27 NOTE — FLOWSHEET NOTE
10/26/24 2051   Vital Signs   /60   Temp 97.7 °F (36.5 °C)   Pulse (!) 112   Respirations 16   SpO2 96 %   Post-Hemodialysis Assessment   Post-Treatment Procedures Blood returned;Catheter capped, clamped with Citrate x 2 ports   Machine Disinfection Process Acid/Vinegar Clean;Verified Absence of Bleach in HCO3 Jug;Machine Absence of Bleach Machine;Bleach   Rinseback Volume (ml) 300 ml   Duration of Treatment (minutes) 192 minutes   Hemodialysis Intake (ml) 1100 ml   Hemodialysis Output (ml) 1800 ml   NET Removed (ml) 700   Tolerated Treatment Fair   Interventions Taken Fluid bolus;Ultrafiltration goal decreased;Ultrafiltration stopped   Bilateral Breath Sounds Diminished   Edema Generalized   Edema Generalized +3   Physician Notified Yes   Time Off 2000   Patient Disposition Remain in ICU/ED   Observations & Evaluations   Heart Rhythm Regular   Respiratory Quality/Effort Unlabored   O2 Device Ventilator   Skin Color Bronze;Ecchymosis (comment)   Skin Condition/Temp Warm;Dry   Abdomen Inspection Distended   Bowel Sounds (All Quadrants) Absent   Comments Tolerated 3 hrs of sled, bp gradually softned despite decreasing ufr net 700cc fluid removal, Dr Nowak notified will start CVVHD in am

## 2024-10-27 NOTE — FLOWSHEET NOTE
Patient intubated and can becoming agitated/restless at times causing movement that can interfere with lines and tubes for critical care management. Will continue use of bilateral soft wrist restraints at this time. Will continue to assess and monitor for earliest removal.
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
## 2024-10-27 NOTE — PLAN OF CARE
Problem: Respiratory - Adult  Goal: Achieves optimal ventilation and oxygenation  10/27/2024 0152 by Emily Aranda RCP  Outcome: Progressing

## 2024-10-27 NOTE — PROGRESS NOTES
emphysema   or pneumothorax.   5. Comminuted bilateral acetabular pelvic fractures, status post bilateral   total hip arthroplasty. There is anterior displacement/dislocation of the   right femoral head component relative to the right acetabular component,   unchanged from the prior examination.      RECOMMENDATIONS:   Careful clinical correlation and follow up recommended.         IR EMBOLIZATION HEMORRHAGE   Final Result   1. Successful  diagnostic angiography of the right iliac arteries.   2. Successful embolization of the right posterior division internal iliac   artery for brisk active hemorrhage.   3. Closure of the rostral and right groin using 6 Romanian Angio-Seal devices   artery and SMA. No active hemorrhage was demonstrated so no intervention was   indicated.         IR YUNIER ART CATH ABD/PELV/LOWER EXT INIT 2ND ORDER   Final Result   1. Successful  diagnostic angiography of the right iliac arteries.   2. Successful embolization of the right posterior division internal iliac   artery for brisk active hemorrhage.   3. Closure of the rostral and right groin using 6 Romanian Angio-Seal devices   artery and SMA. No active hemorrhage was demonstrated so no intervention was   indicated.         IR ANGIOGRAM PELVIC SELECT   Final Result   1. Successful  diagnostic angiography of the right iliac arteries.   2. Successful embolization of the right posterior division internal iliac   artery for brisk active hemorrhage.   3. Closure of the rostral and right groin using 6 Romanian Angio-Seal devices   artery and SMA. No active hemorrhage was demonstrated so no intervention was   indicated.         IR ANGIOGRAM SELECTIVE EACH ADDITIONAL VESSEL   Final Result   1. Successful  diagnostic angiography of the right iliac arteries.   2. Successful embolization of the right posterior division internal iliac   artery for brisk active hemorrhage.   3. Closure of the rostral and right groin using 6 Romanian Angio-Seal devices   artery  and SMA. No active hemorrhage was demonstrated so no intervention was   indicated.         XR CHEST PORTABLE   Final Result   1. New right internal jugular central line with its tip in satisfactory   position in the superior vena cava. No sign of pneumothorax on this side.   2. No sign of acute cardiopulmonary disease.         XR CHEST PORTABLE   Final Result   1. Newly placed endotracheal tube and nasogastric tube in satisfactory   position.   2. No sign of acute cardiopulmonary disease.         XR HAND RIGHT (MIN 3 VIEWS)   Final Result   1. Acute, impacted, comminuted, transverse fracture of the distal radius with   approximately 15 degrees dorsal angulation of the major distal fracture   fragment.   2. Acute, moderately distracted, transverse fracture of the midbody of the   ulnar styloid.         XR PELVIS (1-2 VIEWS)   Final Result   1. Improved alignment after inferior displacement of the dislocated femoral   head component of the right total hip prosthesis. The acetabular and femoral   head components remain , resulting from medial displacement of the   acetabular component and native osseous acetabulum related to the acute   transverse fracture of the superior acetabular region.   2. Stable fracture of the medial right inferior pubic ramus.         CT Head W/O Contrast   Final Result   1. There is no acute intracranial hemorrhage or acute intracranial abnormality   2. Large frontal and superior scalp laceration and contusion.         CT CSpine W/O Contrast   Final Result   1. No acute fracture or traumatic malalignment of the cervical spine.   2. Stable previous anterior metallic fusion at C5-6.   3. Unchanged mild-to-moderate degenerative disc disease and osteophytosis at   C6-7.         CT FACIAL BONES WO CONTRAST   Final Result   1. Significant motion artifact.   2. No acute facial fracture.   3. Prominent frontal scalp laceration. Underlying calvarium appears intact.   The entire injury is not

## 2024-10-27 NOTE — PROCEDURES
PROCEDURE NOTE  Date: 10/27/2024   Name: Glenroy Simmons II  YOB: 1975    Procedures  PLEASE CALL CT WHEN PATIENT IS STABLE AND ABLE TO COME FOR EXAMS

## 2024-10-27 NOTE — PROGRESS NOTES
10/27/24 0446   Patient Observation   Pulse (!) 117   SpO2 98 %   Vent Information   Ventilator Day(s) 4   Ventilator -45   Ventilator Settings   FiO2  (S)  65 %   Vt (Set, mL) 500 mL   Resp Rate (Set) 16 bpm   PEEP/CPAP (cmH2O) 10   Vent Patient Data (Readings)   Vt (Measured) 450 mL   Peak Inspiratory Pressure (cmH2O) 35 cmH2O   Rate Measured 18 br/min   Minute Volume (L/min) 9.16 Liters   Mean Airway Pressure (cmH2O) 17 cmH20   Plateau Pressure (cm H2O) 22 cm H2O   Driving Pressure 12   I:E Ratio 1:2.30   Vent Alarm Settings   High Pressure (cmH2O) 45 cmH2O     Weaned to 65%

## 2024-10-27 NOTE — FLOWSHEET NOTE
Pt in bilateral soft wrist restraints. Pt abruptly wakes and pulls at lines and tubes. Less restrictive measures ineffective. Continue bilateral soft wrist restraints for safety.

## 2024-10-28 ENCOUNTER — APPOINTMENT (OUTPATIENT)
Dept: CT IMAGING | Age: 49
End: 2024-10-28
Payer: MEDICARE

## 2024-10-28 ENCOUNTER — APPOINTMENT (OUTPATIENT)
Dept: GENERAL RADIOLOGY | Age: 49
End: 2024-10-28
Payer: MEDICARE

## 2024-10-28 LAB
AADO2: 356.8 MMHG
AADO2: 393.5 MMHG
ABO/RH: NORMAL
ALBUMIN SERPL-MCNC: 2.3 G/DL (ref 3.5–5.2)
ALBUMIN SERPL-MCNC: 2.4 G/DL (ref 3.5–5.2)
ALBUMIN SERPL-MCNC: 2.5 G/DL (ref 3.5–5.2)
ALP SERPL-CCNC: 111 U/L (ref 40–129)
ALP SERPL-CCNC: 114 U/L (ref 40–129)
ALP SERPL-CCNC: 129 U/L (ref 40–129)
ALT SERPL-CCNC: 765 U/L (ref 0–40)
ALT SERPL-CCNC: 796 U/L (ref 0–40)
ALT SERPL-CCNC: 907 U/L (ref 0–40)
ANION GAP SERPL CALCULATED.3IONS-SCNC: 10 MMOL/L (ref 7–16)
ANION GAP SERPL CALCULATED.3IONS-SCNC: 8 MMOL/L (ref 7–16)
ANION GAP SERPL CALCULATED.3IONS-SCNC: 9 MMOL/L (ref 7–16)
ANTIBODY SCREEN: NEGATIVE
ARM BAND NUMBER: NORMAL
ARM BAND NUMBER: NORMAL
AST SERPL-CCNC: 595 U/L (ref 0–39)
AST SERPL-CCNC: 647 U/L (ref 0–39)
AST SERPL-CCNC: 801 U/L (ref 0–39)
B.E.: 2.4 MMOL/L (ref -3–3)
B.E.: 3.7 MMOL/L (ref -3–3)
BASOPHILS # BLD: 0 K/UL (ref 0–0.2)
BASOPHILS NFR BLD: 0 % (ref 0–2)
BILIRUB SERPL-MCNC: 0.8 MG/DL (ref 0–1.2)
BILIRUB SERPL-MCNC: 0.8 MG/DL (ref 0–1.2)
BILIRUB SERPL-MCNC: 1 MG/DL (ref 0–1.2)
BLOOD BANK BLOOD PRODUCT EXPIRATION DATE: NORMAL
BLOOD BANK DISPENSE STATUS: NORMAL
BLOOD BANK ISBT PRODUCT BLOOD TYPE: 5100
BLOOD BANK ISBT PRODUCT BLOOD TYPE: 6200
BLOOD BANK ISBT PRODUCT BLOOD TYPE: 9500
BLOOD BANK PRODUCT CODE: NORMAL
BLOOD BANK SAMPLE EXPIRATION: NORMAL
BLOOD BANK UNIT TYPE AND RH: NORMAL
BPU ID: NORMAL
BUN SERPL-MCNC: 43 MG/DL (ref 6–20)
BUN SERPL-MCNC: 46 MG/DL (ref 6–20)
BUN SERPL-MCNC: 47 MG/DL (ref 6–20)
CA-I BLD-SCNC: 1.04 MMOL/L (ref 1.15–1.33)
CA-I BLD-SCNC: 1.06 MMOL/L (ref 1.15–1.33)
CA-I BLD-SCNC: 1.07 MMOL/L (ref 1.15–1.33)
CA-I BLD-SCNC: 1.11 MMOL/L (ref 1.15–1.33)
CALCIUM SERPL-MCNC: 7.3 MG/DL (ref 8.6–10.2)
CALCIUM SERPL-MCNC: 7.5 MG/DL (ref 8.6–10.2)
CALCIUM SERPL-MCNC: 7.5 MG/DL (ref 8.6–10.2)
CHLORIDE SERPL-SCNC: 104 MMOL/L (ref 98–107)
CHLORIDE SERPL-SCNC: 104 MMOL/L (ref 98–107)
CHLORIDE SERPL-SCNC: 105 MMOL/L (ref 98–107)
CK SERPL-CCNC: 7717 U/L (ref 20–200)
CK SERPL-CCNC: 8456 U/L (ref 20–200)
CK SERPL-CCNC: ABNORMAL U/L (ref 20–200)
CO2 SERPL-SCNC: 27 MMOL/L (ref 22–29)
CO2 SERPL-SCNC: 28 MMOL/L (ref 22–29)
CO2 SERPL-SCNC: 31 MMOL/L (ref 22–29)
COHB: 0 % (ref 0–1.5)
COHB: 0.2 % (ref 0–1.5)
COMPONENT: NORMAL
CREAT SERPL-MCNC: 1.9 MG/DL (ref 0.7–1.2)
CREAT SERPL-MCNC: 1.9 MG/DL (ref 0.7–1.2)
CREAT SERPL-MCNC: 2 MG/DL (ref 0.7–1.2)
CRITICAL: ABNORMAL
CRITICAL: ABNORMAL
CROSSMATCH RESULT: NORMAL
DATE ANALYZED: ABNORMAL
DATE ANALYZED: ABNORMAL
DATE OF COLLECTION: ABNORMAL
DATE OF COLLECTION: ABNORMAL
EOSINOPHIL # BLD: 0 K/UL (ref 0.05–0.5)
EOSINOPHIL # BLD: 0.35 K/UL (ref 0.05–0.5)
EOSINOPHIL # BLD: 0.35 K/UL (ref 0.05–0.5)
EOSINOPHILS RELATIVE PERCENT: 0 % (ref 0–6)
EOSINOPHILS RELATIVE PERCENT: 2 % (ref 0–6)
EOSINOPHILS RELATIVE PERCENT: 2 % (ref 0–6)
ERYTHROCYTE [DISTWIDTH] IN BLOOD BY AUTOMATED COUNT: 15.8 % (ref 11.5–15)
ERYTHROCYTE [DISTWIDTH] IN BLOOD BY AUTOMATED COUNT: 15.9 % (ref 11.5–15)
ERYTHROCYTE [DISTWIDTH] IN BLOOD BY AUTOMATED COUNT: 16.6 % (ref 11.5–15)
FIO2: 70 %
FIO2: 70 %
GFR, ESTIMATED: 39 ML/MIN/1.73M2
GFR, ESTIMATED: 41 ML/MIN/1.73M2
GFR, ESTIMATED: 43 ML/MIN/1.73M2
GLUCOSE BLD-MCNC: 122 MG/DL (ref 74–99)
GLUCOSE BLD-MCNC: 167 MG/DL (ref 74–99)
GLUCOSE BLD-MCNC: 192 MG/DL (ref 74–99)
GLUCOSE BLD-MCNC: 207 MG/DL (ref 74–99)
GLUCOSE SERPL-MCNC: 167 MG/DL (ref 74–99)
GLUCOSE SERPL-MCNC: 178 MG/DL (ref 74–99)
GLUCOSE SERPL-MCNC: 180 MG/DL (ref 74–99)
HAV IGM SERPL QL IA: NONREACTIVE
HBV CORE IGM SERPL QL IA: NONREACTIVE
HBV SURFACE AB SERPL IA-ACNC: 19.58 MIU/ML (ref 0–9.99)
HBV SURFACE AB SERPL IA-ACNC: 23.1 MIU/ML (ref 0–9.99)
HBV SURFACE AG SERPL QL IA: NONREACTIVE
HBV SURFACE AG SERPL QL IA: NONREACTIVE
HCO3: 26.8 MMOL/L (ref 22–26)
HCO3: 28.1 MMOL/L (ref 22–26)
HCT VFR BLD AUTO: 21.4 % (ref 37–54)
HCT VFR BLD AUTO: 22 % (ref 37–54)
HCT VFR BLD AUTO: 23.9 % (ref 37–54)
HCT VFR BLD AUTO: 24.8 % (ref 37–54)
HCV AB SERPL QL IA: NONREACTIVE
HGB BLD-MCNC: 6.8 G/DL (ref 12.5–16.5)
HGB BLD-MCNC: 7.2 G/DL (ref 12.5–16.5)
HGB BLD-MCNC: 7.8 G/DL (ref 12.5–16.5)
HGB BLD-MCNC: 8.5 G/DL (ref 12.5–16.5)
HHB: 4.7 % (ref 0–5)
HHB: 8.3 % (ref 0–5)
LAB: ABNORMAL
LAB: ABNORMAL
LYMPHOCYTES NFR BLD: 0.18 K/UL (ref 1.5–4)
LYMPHOCYTES NFR BLD: 0.52 K/UL (ref 1.5–4)
LYMPHOCYTES NFR BLD: 0.69 K/UL (ref 1.5–4)
LYMPHOCYTES RELATIVE PERCENT: 1 % (ref 20–42)
LYMPHOCYTES RELATIVE PERCENT: 3 % (ref 20–42)
LYMPHOCYTES RELATIVE PERCENT: 4 % (ref 20–42)
Lab: 1015
Lab: 450
MAGNESIUM SERPL-MCNC: 1.8 MG/DL (ref 1.6–2.6)
MAGNESIUM SERPL-MCNC: 1.9 MG/DL (ref 1.6–2.6)
MAGNESIUM SERPL-MCNC: 2 MG/DL (ref 1.6–2.6)
MCH RBC QN AUTO: 28.8 PG (ref 26–35)
MCH RBC QN AUTO: 29 PG (ref 26–35)
MCH RBC QN AUTO: 29.6 PG (ref 26–35)
MCHC RBC AUTO-ENTMCNC: 31.8 G/DL (ref 32–34.5)
MCHC RBC AUTO-ENTMCNC: 32.6 G/DL (ref 32–34.5)
MCHC RBC AUTO-ENTMCNC: 32.7 G/DL (ref 32–34.5)
MCV RBC AUTO: 88.8 FL (ref 80–99.9)
MCV RBC AUTO: 90.5 FL (ref 80–99.9)
MCV RBC AUTO: 90.7 FL (ref 80–99.9)
METAMYELOCYTES ABSOLUTE COUNT: 0.37 K/UL (ref 0–0.12)
METAMYELOCYTES: 2 % (ref 0–1)
METHB: 0.5 % (ref 0–1.5)
METHB: 5.1 % (ref 0–1.5)
MODE: ABNORMAL
MODE: AC
MONOCYTES NFR BLD: 0.37 K/UL (ref 0.1–0.95)
MONOCYTES NFR BLD: 0.52 K/UL (ref 0.1–0.95)
MONOCYTES NFR BLD: 0.69 K/UL (ref 0.1–0.95)
MONOCYTES NFR BLD: 2 % (ref 2–12)
MONOCYTES NFR BLD: 3 % (ref 2–12)
MONOCYTES NFR BLD: 4 % (ref 2–12)
MYELOCYTES ABSOLUTE COUNT: 0.17 K/UL
MYELOCYTES ABSOLUTE COUNT: 0.17 K/UL
MYELOCYTES: 1 %
MYELOCYTES: 1 %
NEUTROPHILS NFR BLD: 90 % (ref 43–80)
NEUTROPHILS NFR BLD: 91 % (ref 43–80)
NEUTROPHILS NFR BLD: 96 % (ref 43–80)
NEUTS SEG NFR BLD: 17.8 K/UL (ref 1.8–7.3)
NEUTS SEG NFR BLD: 17.97 K/UL (ref 1.8–7.3)
NEUTS SEG NFR BLD: 19.98 K/UL (ref 1.8–7.3)
NUCLEATED RED BLOOD CELLS: 11 PER 100 WBC
NUCLEATED RED BLOOD CELLS: 15 PER 100 WBC
NUCLEATED RED BLOOD CELLS: 4 PER 100 WBC
O2 SATURATION: 91.3 % (ref 92–98.5)
O2 SATURATION: 95.3 % (ref 92–98.5)
O2HB: 86.6 % (ref 94–97)
O2HB: 94.6 % (ref 94–97)
OPERATOR ID: 187
OPERATOR ID: 2577
PATIENT TEMP: 37 C
PATIENT TEMP: 37 C
PCO2: 40.6 MMHG (ref 35–45)
PCO2: 41.6 MMHG (ref 35–45)
PEEP/CPAP: 10 CMH2O
PEEP/CPAP: 10 CMH2O
PFO2: 0.87 MMHG/%
PFO2: 1.16 MMHG/%
PH BLOOD GAS: 7.44 (ref 7.35–7.45)
PH BLOOD GAS: 7.45 (ref 7.35–7.45)
PHOSPHATE SERPL-MCNC: 3.4 MG/DL (ref 2.5–4.5)
PHOSPHATE SERPL-MCNC: 4 MG/DL (ref 2.5–4.5)
PHOSPHATE SERPL-MCNC: 4 MG/DL (ref 2.5–4.5)
PLATELET # BLD AUTO: 101 K/UL (ref 130–450)
PLATELET # BLD AUTO: 112 K/UL (ref 130–450)
PLATELET, FLUORESCENCE: 107 K/UL (ref 130–450)
PMV BLD AUTO: 11.1 FL (ref 7–12)
PMV BLD AUTO: 11.5 FL (ref 7–12)
PMV BLD AUTO: 12.3 FL (ref 7–12)
PO2: 60.9 MMHG (ref 75–100)
PO2: 81.2 MMHG (ref 75–100)
POTASSIUM SERPL-SCNC: 3.8 MMOL/L (ref 3.5–5)
POTASSIUM SERPL-SCNC: 3.9 MMOL/L (ref 3.5–5)
POTASSIUM SERPL-SCNC: 3.9 MMOL/L (ref 3.5–5)
PROMYELOCYTES ABSOLUTE COUNT: 0.17 K/UL
PROMYELOCYTES: 1 %
PROT SERPL-MCNC: 4.3 G/DL (ref 6.4–8.3)
PROT SERPL-MCNC: 4.3 G/DL (ref 6.4–8.3)
PROT SERPL-MCNC: 4.7 G/DL (ref 6.4–8.3)
RBC # BLD AUTO: 2.36 M/UL (ref 3.8–5.8)
RBC # BLD AUTO: 2.43 M/UL (ref 3.8–5.8)
RBC # BLD AUTO: 2.69 M/UL (ref 3.8–5.8)
RBC # BLD: ABNORMAL 10*6/UL
RI(T): 4.39
RI(T): 6.46
RR MECHANICAL: 16 B/MIN
RR MECHANICAL: 16 B/MIN
SODIUM SERPL-SCNC: 141 MMOL/L (ref 132–146)
SODIUM SERPL-SCNC: 142 MMOL/L (ref 132–146)
SODIUM SERPL-SCNC: 143 MMOL/L (ref 132–146)
SOURCE, BLOOD GAS: ABNORMAL
SOURCE, BLOOD GAS: ABNORMAL
THB: 7.8 G/DL (ref 11.5–16.5)
THB: 8 G/DL (ref 11.5–16.5)
TIME ANALYZED: 1024
TIME ANALYZED: 452
TRANSFUSION STATUS: NORMAL
UNIT DIVISION: 0
UNIT ISSUE DATE/TIME: NORMAL
VT MECHANICAL: 500 ML
VT MECHANICAL: 500 ML
WBC # BLD: ABNORMAL 10*3/UL
WBC OTHER # BLD: 19.7 K/UL (ref 4.5–11.5)
WBC OTHER # BLD: 19.7 K/UL (ref 4.5–11.5)
WBC OTHER # BLD: 20.9 K/UL (ref 4.5–11.5)

## 2024-10-28 PROCEDURE — 6370000000 HC RX 637 (ALT 250 FOR IP): Performed by: INTERNAL MEDICINE

## 2024-10-28 PROCEDURE — 85018 HEMOGLOBIN: CPT

## 2024-10-28 PROCEDURE — 71045 X-RAY EXAM CHEST 1 VIEW: CPT

## 2024-10-28 PROCEDURE — 85014 HEMATOCRIT: CPT

## 2024-10-28 PROCEDURE — 6370000000 HC RX 637 (ALT 250 FOR IP)

## 2024-10-28 PROCEDURE — 86901 BLOOD TYPING SEROLOGIC RH(D): CPT

## 2024-10-28 PROCEDURE — 94003 VENT MGMT INPAT SUBQ DAY: CPT

## 2024-10-28 PROCEDURE — 86900 BLOOD TYPING SEROLOGIC ABO: CPT

## 2024-10-28 PROCEDURE — 83735 ASSAY OF MAGNESIUM: CPT

## 2024-10-28 PROCEDURE — 36430 TRANSFUSION BLD/BLD COMPNT: CPT

## 2024-10-28 PROCEDURE — 82550 ASSAY OF CK (CPK): CPT

## 2024-10-28 PROCEDURE — 2580000003 HC RX 258: Performed by: INTERNAL MEDICINE

## 2024-10-28 PROCEDURE — 82805 BLOOD GASES W/O2 SATURATION: CPT

## 2024-10-28 PROCEDURE — 86850 RBC ANTIBODY SCREEN: CPT

## 2024-10-28 PROCEDURE — 2580000003 HC RX 258

## 2024-10-28 PROCEDURE — 2500000003 HC RX 250 WO HCPCS

## 2024-10-28 PROCEDURE — 82330 ASSAY OF CALCIUM: CPT

## 2024-10-28 PROCEDURE — 37799 UNLISTED PX VASCULAR SURGERY: CPT

## 2024-10-28 PROCEDURE — 85025 COMPLETE CBC W/AUTO DIFF WBC: CPT

## 2024-10-28 PROCEDURE — 2000000000 HC ICU R&B

## 2024-10-28 PROCEDURE — 76376 3D RENDER W/INTRP POSTPROCES: CPT

## 2024-10-28 PROCEDURE — 6360000002 HC RX W HCPCS: Performed by: INTERNAL MEDICINE

## 2024-10-28 PROCEDURE — 6360000002 HC RX W HCPCS

## 2024-10-28 PROCEDURE — 99291 CRITICAL CARE FIRST HOUR: CPT | Performed by: SURGERY

## 2024-10-28 PROCEDURE — 80053 COMPREHEN METABOLIC PANEL: CPT

## 2024-10-28 PROCEDURE — 82962 GLUCOSE BLOOD TEST: CPT

## 2024-10-28 PROCEDURE — 84100 ASSAY OF PHOSPHORUS: CPT

## 2024-10-28 PROCEDURE — 2500000003 HC RX 250 WO HCPCS: Performed by: INTERNAL MEDICINE

## 2024-10-28 PROCEDURE — P9016 RBC LEUKOCYTES REDUCED: HCPCS

## 2024-10-28 PROCEDURE — 86920 COMPATIBILITY TEST SPIN: CPT

## 2024-10-28 PROCEDURE — 99223 1ST HOSP IP/OBS HIGH 75: CPT | Performed by: ORTHOPAEDIC SURGERY

## 2024-10-28 RX ORDER — SODIUM CHLORIDE 9 MG/ML
INJECTION, SOLUTION INTRAVENOUS PRN
Status: DISCONTINUED | OUTPATIENT
Start: 2024-10-28 | End: 2024-10-30

## 2024-10-28 RX ADMIN — HYDROCORTISONE SODIUM SUCCINATE 50 MG: 100 INJECTION, POWDER, FOR SOLUTION INTRAMUSCULAR; INTRAVENOUS at 00:38

## 2024-10-28 RX ADMIN — MINERAL OIL, WHITE PETROLATUM: .03; .94 OINTMENT OPHTHALMIC at 15:23

## 2024-10-28 RX ADMIN — FAMOTIDINE 10 MG: 10 INJECTION, SOLUTION INTRAVENOUS at 08:53

## 2024-10-28 RX ADMIN — METHOCARBAMOL 1000 MG: 500 TABLET ORAL at 12:30

## 2024-10-28 RX ADMIN — SODIUM CHLORIDE, PRESERVATIVE FREE 10 ML: 5 INJECTION INTRAVENOUS at 21:12

## 2024-10-28 RX ADMIN — PROPOFOL 35.02 MCG/KG/MIN: 10 INJECTION, EMULSION INTRAVENOUS at 22:32

## 2024-10-28 RX ADMIN — PROPOFOL 35 MCG/KG/MIN: 10 INJECTION, EMULSION INTRAVENOUS at 15:23

## 2024-10-28 RX ADMIN — SODIUM CHLORIDE, PRESERVATIVE FREE 10 ML: 5 INJECTION INTRAVENOUS at 08:54

## 2024-10-28 RX ADMIN — HYDROCORTISONE SODIUM SUCCINATE 50 MG: 100 INJECTION, POWDER, FOR SOLUTION INTRAMUSCULAR; INTRAVENOUS at 18:13

## 2024-10-28 RX ADMIN — ACETAMINOPHEN 650 MG: 325 TABLET ORAL at 05:38

## 2024-10-28 RX ADMIN — HYDROCORTISONE SODIUM SUCCINATE 50 MG: 100 INJECTION, POWDER, FOR SOLUTION INTRAMUSCULAR; INTRAVENOUS at 23:50

## 2024-10-28 RX ADMIN — HYDROCORTISONE SODIUM SUCCINATE 50 MG: 100 INJECTION, POWDER, FOR SOLUTION INTRAMUSCULAR; INTRAVENOUS at 12:30

## 2024-10-28 RX ADMIN — Medication 150 MCG/HR: at 01:09

## 2024-10-28 RX ADMIN — METHOCARBAMOL 1000 MG: 500 TABLET ORAL at 21:10

## 2024-10-28 RX ADMIN — INSULIN LISPRO 1 UNITS: 100 INJECTION, SOLUTION INTRAVENOUS; SUBCUTANEOUS at 02:21

## 2024-10-28 RX ADMIN — MINERAL OIL, WHITE PETROLATUM: .03; .94 OINTMENT OPHTHALMIC at 21:11

## 2024-10-28 RX ADMIN — GABAPENTIN 300 MG: 300 CAPSULE ORAL at 21:11

## 2024-10-28 RX ADMIN — Medication 150 MCG/HR: at 19:48

## 2024-10-28 RX ADMIN — CALCIUM GLUCONATE 2000 MG: 98 INJECTION, SOLUTION INTRAVENOUS at 10:52

## 2024-10-28 RX ADMIN — PROPOFOL 40 MCG/KG/MIN: 10 INJECTION, EMULSION INTRAVENOUS at 02:16

## 2024-10-28 RX ADMIN — Medication 150 MCG/HR: at 07:15

## 2024-10-28 RX ADMIN — PROPOFOL 40 MCG/KG/MIN: 10 INJECTION, EMULSION INTRAVENOUS at 05:04

## 2024-10-28 RX ADMIN — INSULIN LISPRO 1 UNITS: 100 INJECTION, SOLUTION INTRAVENOUS; SUBCUTANEOUS at 11:26

## 2024-10-28 RX ADMIN — MINERAL OIL, WHITE PETROLATUM: .03; .94 OINTMENT OPHTHALMIC at 05:03

## 2024-10-28 RX ADMIN — ACETAMINOPHEN 650 MG: 325 TABLET ORAL at 12:30

## 2024-10-28 RX ADMIN — GABAPENTIN 300 MG: 300 CAPSULE ORAL at 08:53

## 2024-10-28 RX ADMIN — SODIUM CHLORIDE, POTASSIUM CHLORIDE, SODIUM LACTATE AND CALCIUM CHLORIDE: 600; 310; 30; 20 INJECTION, SOLUTION INTRAVENOUS at 08:57

## 2024-10-28 RX ADMIN — CALCIUM GLUCONATE 2000 MG: 98 INJECTION, SOLUTION INTRAVENOUS at 21:09

## 2024-10-28 RX ADMIN — ACETAMINOPHEN 650 MG: 325 TABLET ORAL at 23:49

## 2024-10-28 RX ADMIN — METHOCARBAMOL 1000 MG: 500 TABLET ORAL at 08:53

## 2024-10-28 RX ADMIN — CHLORHEXIDINE GLUCONATE, 0.12% ORAL RINSE 15 ML: 1.2 SOLUTION DENTAL at 08:53

## 2024-10-28 RX ADMIN — SODIUM CHLORIDE, POTASSIUM CHLORIDE, SODIUM LACTATE AND CALCIUM CHLORIDE: 600; 310; 30; 20 INJECTION, SOLUTION INTRAVENOUS at 19:00

## 2024-10-28 RX ADMIN — PROPOFOL 35 MCG/KG/MIN: 10 INJECTION, EMULSION INTRAVENOUS at 18:58

## 2024-10-28 RX ADMIN — ACETAMINOPHEN 650 MG: 325 TABLET ORAL at 00:37

## 2024-10-28 RX ADMIN — Medication 150 MCG/HR: at 14:43

## 2024-10-28 RX ADMIN — HYDROCORTISONE SODIUM SUCCINATE 50 MG: 100 INJECTION, POWDER, FOR SOLUTION INTRAMUSCULAR; INTRAVENOUS at 05:38

## 2024-10-28 RX ADMIN — ACETAMINOPHEN 650 MG: 325 TABLET ORAL at 18:13

## 2024-10-28 RX ADMIN — PROPOFOL 40 MCG/KG/MIN: 10 INJECTION, EMULSION INTRAVENOUS at 07:57

## 2024-10-28 RX ADMIN — MINERAL OIL, WHITE PETROLATUM: .03; .94 OINTMENT OPHTHALMIC at 08:53

## 2024-10-28 RX ADMIN — POLYETHYLENE GLYCOL 3350 17 G: 17 POWDER, FOR SOLUTION ORAL at 08:53

## 2024-10-28 RX ADMIN — PROPOFOL 35 MCG/KG/MIN: 10 INJECTION, EMULSION INTRAVENOUS at 11:29

## 2024-10-28 RX ADMIN — METHOCARBAMOL 1000 MG: 500 TABLET ORAL at 18:13

## 2024-10-28 RX ADMIN — BUMETANIDE 0.2 MG/HR: 0.25 INJECTION INTRAMUSCULAR; INTRAVENOUS at 22:07

## 2024-10-28 RX ADMIN — CHLORHEXIDINE GLUCONATE, 0.12% ORAL RINSE 15 ML: 1.2 SOLUTION DENTAL at 21:10

## 2024-10-28 RX ADMIN — MINERAL OIL, WHITE PETROLATUM: .03; .94 OINTMENT OPHTHALMIC at 18:13

## 2024-10-28 RX ADMIN — MINERAL OIL, WHITE PETROLATUM: .03; .94 OINTMENT OPHTHALMIC at 00:37

## 2024-10-28 ASSESSMENT — PULMONARY FUNCTION TESTS
PIF_VALUE: 31
PIF_VALUE: 22
PIF_VALUE: 30
PIF_VALUE: 19
PIF_VALUE: 27
PIF_VALUE: 20
PIF_VALUE: 29
PIF_VALUE: 33
PIF_VALUE: 28
PIF_VALUE: 35
PIF_VALUE: 32
PIF_VALUE: 23
PIF_VALUE: 33
PIF_VALUE: 27
PIF_VALUE: 28
PIF_VALUE: 30
PIF_VALUE: 28
PIF_VALUE: 35
PIF_VALUE: 29
PIF_VALUE: 26
PIF_VALUE: 33
PIF_VALUE: 35
PIF_VALUE: 23
PIF_VALUE: 38
PIF_VALUE: 38
PIF_VALUE: 24
PIF_VALUE: 29
PIF_VALUE: 25
PIF_VALUE: 26

## 2024-10-28 ASSESSMENT — PAIN SCALES - GENERAL
PAINLEVEL_OUTOF10: 0
PAINLEVEL_OUTOF10: 1
PAINLEVEL_OUTOF10: 0

## 2024-10-28 ASSESSMENT — PAIN - FUNCTIONAL ASSESSMENT
PAIN_FUNCTIONAL_ASSESSMENT: PREVENTS OR INTERFERES WITH ALL ACTIVE AND SOME PASSIVE ACTIVITIES
PAIN_FUNCTIONAL_ASSESSMENT: PREVENTS OR INTERFERES WITH ALL ACTIVE AND SOME PASSIVE ACTIVITIES

## 2024-10-28 NOTE — PROGRESS NOTES
2020 - Pt turned to R per orders. Pt is tachypneic with increase WOB/accessory muscle use; O2 sat remains > 92%. Dr. Funes notified via secure message and requested to bedside.    2030 - Dr. Funes at bedside and assisted with left turn to assess if tolerates better. Mildly improved WOB. RN asking plan to address increased WOB - per MD will continue current sedation and assess patient's ABG at 2200. If ABG changed and continued WOB, will resume do not turn status.

## 2024-10-28 NOTE — PROGRESS NOTES
OCCUPATIONAL THERAPY    Date:10/28/2024  Patient Name: Glenroy Simmons II  MRN: [id number]  : 1975  Room: 3806/3806-A              Chart reviewed. Pt still in traction; not appropriate for therapy at this time. Will discontinue order; please re-order when pt able to medically participate in sessions. Thank you.    Charla Titus, OTR/L [id number]

## 2024-10-28 NOTE — PROGRESS NOTES
10/28/24 0800   Patient Observation   Pulse (!) 108   Respirations 17   SpO2 93 %   Vent Information   Ventilator -45   Vent Mode AC/PRVC   Ventilator Settings   FiO2  70 %   Insp Time (sec) 0.78 sec   Vt (Set, mL) 500 mL   Resp Rate (Set) 16 bpm   PEEP/CPAP (cmH2O) 10   Vent Patient Data (Readings)   Vt (Measured) 736 mL   Peak Inspiratory Pressure (cmH2O) 24 cmH2O   Rate Measured 17 br/min   Minute Volume (L/min) 8.79 Liters   Mean Airway Pressure (cmH2O) 14 cmH20   Plateau Pressure (cm H2O) 21 cm H2O   Driving Pressure 11   I:E Ratio 1:3.80   Static Compliance (L/cm H2O) 51   I Time/ I Time % 0 s   Backup Apnea On   Backup Rate 16 Breaths Per Minute   Backup Vt 500   Vent Alarm Settings   High Pressure (cmH2O) 45 cmH2O   Low Minute Volume (lpm) 6 L/min   High Minute Volume (lpm) 18 L/min   Low Exhaled Vt (ml) 400 mL   High Exhaled Vt (ml) 900 mL   RR High (bpm) 30 br/min   Apnea (secs) 20 secs   Additional Respiratoray Assessments   Humidification Source Heated wire   Humidification Temp 36.4   Circuit Condensation Drained   Ambu Bag With Mask At Bedside Yes   Airway Clearance   Suction ET Tube   Suction Device Inline suction catheter   Sputum Method Obtained Endotracheal   Sputum Amount Small   Sputum Color/Odor Tan   Sputum Consistency Thin   ETT    Placement Date/Time: 10/24/24 1925   Present on Admission/Arrival: No  Placed By: (c)   Placement Verified By: Auscultation;Chest X-ray  Preoxygenation: Yes  Mask Ventilation: Ventilated by mask (1)  Technique: Video laryngoscopy  Airway Type: Cuffed ...   Secured At 25 cm   Measured From Lips   ETT Placement Center   Secured By Commercial tube rebolledo
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
## 2024-10-28 NOTE — PLAN OF CARE
Problem: Respiratory - Adult  Goal: Achieves optimal ventilation and oxygenation  10/28/2024 0154 by Emily Aranda RCP  Outcome: Progressing
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
## 2024-10-28 NOTE — FLOWSHEET NOTE
Patient continues to reach for ett and ngt while performing patient care unable to redirect at this time will continue to monitor
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
## 2024-10-28 NOTE — PLAN OF CARE
Problem: Respiratory - Adult  Goal: Achieves optimal ventilation and oxygenation  10/28/2024 0923 by Barry Garibay RCP  Outcome: Not Progressing      Patient is on FIO2 70%, PEEP 10. SPO2 91-92%
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
## 2024-10-28 NOTE — FLOWSHEET NOTE
Pt in bilateral soft wrist restraints. Pt agitated and reaching for lines/tubes. Less restrictive measures ineffective. Continue bilateral soft wrist restraints for safety.

## 2024-10-28 NOTE — PROGRESS NOTES
Physical Therapy  Physical Therapy Attempt    Name: Glenroy Simmons II  : 1975  MRN: [id number]      Date of Service: 10/28/2024  Chart reviewed.  Pt is still in traction and not appropriate for skilled PT.  Will discontinue order.  Please re-consult when pt is medically stable and can participate in skilled interventions.    Alex Gallardo, PT, DPT  [id number]

## 2024-10-28 NOTE — FLOWSHEET NOTE
Patient intubated and becomes agitated at times moving and attempting to reach for lines and tubes for critical care management despite attempts of redirection/reorientation. Will continue use of bilateral soft wrist restraints at this time. Will continue to assess and monitor for earliest removal.

## 2024-10-28 NOTE — PROGRESS NOTES
10/27/24 2116   Patient Observation   Pulse (!) 115   SpO2 93 %   Vent Information   Ventilator Day(s) 4   Ventilator -45   Vent Mode (S)  AC/PRVC  (patient very asyncronous)   Ventilator Settings   FiO2  70 %   Insp Time (sec) (S)  0.78 sec   Vt (Set, mL) 500 mL   Resp Rate (Set) 16 bpm   PEEP/CPAP (cmH2O) 10   Vent Patient Data (Readings)   Vt (Measured) 848 mL   Peak Inspiratory Pressure (cmH2O) 34 cmH2O   Rate Measured 18 br/min   Minute Volume (L/min) 10.7 Liters   Mean Airway Pressure (cmH2O) 16 cmH20   Plateau Pressure (cm H2O) 29 cm H2O   Driving Pressure 19   I:E Ratio 1:3.80   Static Compliance (L/cm H2O) 43   I Time/ I Time % 0 s   Backup Apnea On   Vent Alarm Settings   High Pressure (cmH2O) 45 cmH2O   Low Minute Volume (lpm) 6 L/min   High Minute Volume (lpm) 18 L/min   Low Exhaled Vt (ml) 400 mL   High Exhaled Vt (ml) 900 mL   RR High (bpm) 30 br/min   Apnea (secs) 20 secs   Additional Respiratoray Assessments   Humidification Source Heated wire   Humidification Temp 37   Circuit Condensation Drained   Airway Clearance   Suction ET Tube   ETT    Placement Date/Time: 10/24/24 1925   Present on Admission/Arrival: No  Placed By: (c)   Placement Verified By: Auscultation;Chest X-ray  Preoxygenation: Yes  Mask Ventilation: Ventilated by mask (1)  Technique: Video laryngoscopy  Airway Type: Cuffed ...   Secured At 26 cm   Measured From Lips   Secured By Commercial tube rebolledo   Ventilator Associated Pneumonia Bundle   Elevation of Head of Bed to 30-45 Degrees  Yes   Skin Assessment   Skin Assessment Clean, dry, & intact     Patient changed to PRVC

## 2024-10-28 NOTE — PLAN OF CARE
Problem: Respiratory - Adult  Goal: Achieves optimal ventilation and oxygenation  10/27/2024 2126 by Emily Aranda RCP  Outcome: Progressing
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
## 2024-10-28 NOTE — PLAN OF CARE
Problem: Safety - Medical Restraint  Goal: Remains free of injury from restraints (Restraint for Interference with Medical Device)  Description: INTERVENTIONS:  1. Determine that other, less restrictive measures have been tried or would not be effective before applying the restraint  2. Evaluate the patient's condition at the time of restraint application  3. Inform patient/family regarding the reason for restraint  4. Q2H: Monitor safety, psychosocial status, comfort, nutrition and hydration  Outcome: Progressing     Problem: Discharge Planning  Goal: Discharge to home or other facility with appropriate resources  Outcome: Not Progressing     Problem: Pain  Goal: Verbalizes/displays adequate comfort level or baseline comfort level  Outcome: Not Progressing     Problem: Neurosensory - Adult  Goal: Achieves maximal functionality and self care  Outcome: Not Progressing     Problem: Respiratory - Adult  Goal: Achieves optimal ventilation and oxygenation  10/28/2024 1339 by Krista Ceja RN  Outcome: Not Progressing  10/28/2024 0923 by Barry Garibay RCP  Outcome: Not Progressing  10/28/2024 0154 by Emily Aranda RCP  Outcome: Progressing  10/28/2024 0154 by Emily Aranda RCP  Outcome: Progressing     Problem: Cardiovascular - Adult  Goal: Maintains optimal cardiac output and hemodynamic stability  Outcome: Not Progressing     Problem: Musculoskeletal - Adult  Goal: Return mobility to safest level of function  Outcome: Not Progressing  Goal: Return ADL status to a safe level of function  Outcome: Not Progressing     Problem: Gastrointestinal - Adult  Goal: Maintains or returns to baseline bowel function  Outcome: Not Progressing     Problem: Metabolic/Fluid and Electrolytes - Adult  Goal: Electrolytes maintained within normal limits  Outcome: Not Progressing

## 2024-10-29 ENCOUNTER — APPOINTMENT (OUTPATIENT)
Dept: GENERAL RADIOLOGY | Age: 49
End: 2024-10-29
Payer: MEDICARE

## 2024-10-29 LAB
AADO2: 469.5 MMHG
ALBUMIN SERPL-MCNC: 2.4 G/DL (ref 3.5–5.2)
ALBUMIN SERPL-MCNC: 2.5 G/DL (ref 3.5–5.2)
ALBUMIN SERPL-MCNC: 2.6 G/DL (ref 3.5–5.2)
ALBUMIN SERPL-MCNC: 2.6 G/DL (ref 3.5–5.2)
ALBUMIN SERPL-MCNC: 2.7 G/DL (ref 3.5–5.2)
ALP SERPL-CCNC: 126 U/L (ref 40–129)
ALP SERPL-CCNC: 135 U/L (ref 40–129)
ALP SERPL-CCNC: 147 U/L (ref 40–129)
ALP SERPL-CCNC: 149 U/L (ref 40–129)
ALP SERPL-CCNC: 191 U/L (ref 40–129)
ALT SERPL-CCNC: 441 U/L (ref 0–40)
ALT SERPL-CCNC: 458 U/L (ref 0–40)
ALT SERPL-CCNC: 552 U/L (ref 0–40)
ALT SERPL-CCNC: 619 U/L (ref 0–40)
ALT SERPL-CCNC: 716 U/L (ref 0–40)
ANION GAP SERPL CALCULATED.3IONS-SCNC: 11 MMOL/L (ref 7–16)
ANION GAP SERPL CALCULATED.3IONS-SCNC: 8 MMOL/L (ref 7–16)
ANION GAP SERPL CALCULATED.3IONS-SCNC: 9 MMOL/L (ref 7–16)
AST SERPL-CCNC: 256 U/L (ref 0–39)
AST SERPL-CCNC: 271 U/L (ref 0–39)
AST SERPL-CCNC: 323 U/L (ref 0–39)
AST SERPL-CCNC: 386 U/L (ref 0–39)
AST SERPL-CCNC: 516 U/L (ref 0–39)
B.E.: 9.8 MMOL/L (ref -3–3)
BASOPHILS # BLD: 0 K/UL (ref 0–0.2)
BASOPHILS # BLD: 0.17 K/UL (ref 0–0.2)
BASOPHILS NFR BLD: 0 % (ref 0–2)
BASOPHILS NFR BLD: 1 % (ref 0–2)
BILIRUB SERPL-MCNC: 1.1 MG/DL (ref 0–1.2)
BILIRUB SERPL-MCNC: 1.2 MG/DL (ref 0–1.2)
BILIRUB SERPL-MCNC: 1.8 MG/DL (ref 0–1.2)
BUN SERPL-MCNC: 45 MG/DL (ref 6–20)
BUN SERPL-MCNC: 45 MG/DL (ref 6–20)
BUN SERPL-MCNC: 47 MG/DL (ref 6–20)
BUN SERPL-MCNC: 48 MG/DL (ref 6–20)
BUN SERPL-MCNC: 49 MG/DL (ref 6–20)
CA-I BLD-SCNC: 1.08 MMOL/L (ref 1.15–1.33)
CA-I BLD-SCNC: 1.1 MMOL/L (ref 1.15–1.33)
CA-I BLD-SCNC: 1.11 MMOL/L (ref 1.15–1.33)
CA-I BLD-SCNC: 1.13 MMOL/L (ref 1.15–1.33)
CALCIUM SERPL-MCNC: 7.7 MG/DL (ref 8.6–10.2)
CALCIUM SERPL-MCNC: 7.9 MG/DL (ref 8.6–10.2)
CALCIUM SERPL-MCNC: 8 MG/DL (ref 8.6–10.2)
CALCIUM SERPL-MCNC: 8 MG/DL (ref 8.6–10.2)
CALCIUM SERPL-MCNC: 8.4 MG/DL (ref 8.6–10.2)
CHLORIDE SERPL-SCNC: 101 MMOL/L (ref 98–107)
CHLORIDE SERPL-SCNC: 102 MMOL/L (ref 98–107)
CHLORIDE SERPL-SCNC: 102 MMOL/L (ref 98–107)
CHLORIDE SERPL-SCNC: 103 MMOL/L (ref 98–107)
CHLORIDE SERPL-SCNC: 104 MMOL/L (ref 98–107)
CK SERPL-CCNC: 5357 U/L (ref 20–200)
CO2 SERPL-SCNC: 32 MMOL/L (ref 22–29)
CO2 SERPL-SCNC: 32 MMOL/L (ref 22–29)
CO2 SERPL-SCNC: 35 MMOL/L (ref 22–29)
CO2 SERPL-SCNC: 35 MMOL/L (ref 22–29)
CO2 SERPL-SCNC: 36 MMOL/L (ref 22–29)
COHB: 0.2 % (ref 0–1.5)
CREAT SERPL-MCNC: 1.4 MG/DL (ref 0.7–1.2)
CREAT SERPL-MCNC: 1.5 MG/DL (ref 0.7–1.2)
CREAT SERPL-MCNC: 1.6 MG/DL (ref 0.7–1.2)
CREAT SERPL-MCNC: 1.7 MG/DL (ref 0.7–1.2)
CREAT SERPL-MCNC: 1.7 MG/DL (ref 0.7–1.2)
CRITICAL: ABNORMAL
DATE ANALYZED: ABNORMAL
DATE OF COLLECTION: ABNORMAL
EOSINOPHIL # BLD: 0 K/UL (ref 0.05–0.5)
EOSINOPHIL # BLD: 0.15 K/UL (ref 0.05–0.5)
EOSINOPHIL # BLD: 0.16 K/UL (ref 0.05–0.5)
EOSINOPHIL # BLD: 0.35 K/UL (ref 0.05–0.5)
EOSINOPHILS RELATIVE PERCENT: 0 % (ref 0–6)
EOSINOPHILS RELATIVE PERCENT: 1 % (ref 0–6)
EOSINOPHILS RELATIVE PERCENT: 1 % (ref 0–6)
EOSINOPHILS RELATIVE PERCENT: 2 % (ref 0–6)
ERYTHROCYTE [DISTWIDTH] IN BLOOD BY AUTOMATED COUNT: 16.3 % (ref 11.5–15)
ERYTHROCYTE [DISTWIDTH] IN BLOOD BY AUTOMATED COUNT: 16.7 % (ref 11.5–15)
ERYTHROCYTE [DISTWIDTH] IN BLOOD BY AUTOMATED COUNT: 16.8 % (ref 11.5–15)
ERYTHROCYTE [DISTWIDTH] IN BLOOD BY AUTOMATED COUNT: 17 % (ref 11.5–15)
FIO2: 90 %
GFR, ESTIMATED: 48 ML/MIN/1.73M2
GFR, ESTIMATED: 49 ML/MIN/1.73M2
GFR, ESTIMATED: 51 ML/MIN/1.73M2
GFR, ESTIMATED: 57 ML/MIN/1.73M2
GFR, ESTIMATED: 60 ML/MIN/1.73M2
GLUCOSE BLD-MCNC: 170 MG/DL (ref 74–99)
GLUCOSE BLD-MCNC: 171 MG/DL (ref 74–99)
GLUCOSE BLD-MCNC: 173 MG/DL (ref 74–99)
GLUCOSE BLD-MCNC: 177 MG/DL (ref 74–99)
GLUCOSE BLD-MCNC: 178 MG/DL (ref 74–99)
GLUCOSE BLD-MCNC: 204 MG/DL (ref 74–99)
GLUCOSE SERPL-MCNC: 170 MG/DL (ref 74–99)
GLUCOSE SERPL-MCNC: 178 MG/DL (ref 74–99)
GLUCOSE SERPL-MCNC: 179 MG/DL (ref 74–99)
GLUCOSE SERPL-MCNC: 188 MG/DL (ref 74–99)
GLUCOSE SERPL-MCNC: 207 MG/DL (ref 74–99)
HCO3: 34.3 MMOL/L (ref 22–26)
HCT VFR BLD AUTO: 22.8 % (ref 37–54)
HCT VFR BLD AUTO: 23.9 % (ref 37–54)
HCT VFR BLD AUTO: 24.4 % (ref 37–54)
HCT VFR BLD AUTO: 24.9 % (ref 37–54)
HGB BLD-MCNC: 7.2 G/DL (ref 12.5–16.5)
HGB BLD-MCNC: 7.6 G/DL (ref 12.5–16.5)
HGB BLD-MCNC: 7.9 G/DL (ref 12.5–16.5)
HGB BLD-MCNC: 8.1 G/DL (ref 12.5–16.5)
HHB: 1.5 % (ref 0–5)
LAB: ABNORMAL
LYMPHOCYTES NFR BLD: 0.17 K/UL (ref 1.5–4)
LYMPHOCYTES NFR BLD: 0.92 K/UL (ref 1.5–4)
LYMPHOCYTES NFR BLD: 1.01 K/UL (ref 1.5–4)
LYMPHOCYTES NFR BLD: 1.42 K/UL (ref 1.5–4)
LYMPHOCYTES RELATIVE PERCENT: 1 % (ref 20–42)
LYMPHOCYTES RELATIVE PERCENT: 5 % (ref 20–42)
LYMPHOCYTES RELATIVE PERCENT: 5 % (ref 20–42)
LYMPHOCYTES RELATIVE PERCENT: 8 % (ref 20–42)
Lab: 1520
MAGNESIUM SERPL-MCNC: 1.7 MG/DL (ref 1.6–2.6)
MAGNESIUM SERPL-MCNC: 1.7 MG/DL (ref 1.6–2.6)
MAGNESIUM SERPL-MCNC: 1.8 MG/DL (ref 1.6–2.6)
MAGNESIUM SERPL-MCNC: 2 MG/DL (ref 1.6–2.6)
MAGNESIUM SERPL-MCNC: 2.2 MG/DL (ref 1.6–2.6)
MCH RBC QN AUTO: 28.6 PG (ref 26–35)
MCH RBC QN AUTO: 28.7 PG (ref 26–35)
MCH RBC QN AUTO: 29 PG (ref 26–35)
MCH RBC QN AUTO: 29 PG (ref 26–35)
MCHC RBC AUTO-ENTMCNC: 31.6 G/DL (ref 32–34.5)
MCHC RBC AUTO-ENTMCNC: 31.8 G/DL (ref 32–34.5)
MCHC RBC AUTO-ENTMCNC: 32.4 G/DL (ref 32–34.5)
MCHC RBC AUTO-ENTMCNC: 32.5 G/DL (ref 32–34.5)
MCV RBC AUTO: 89.2 FL (ref 80–99.9)
MCV RBC AUTO: 89.7 FL (ref 80–99.9)
MCV RBC AUTO: 89.8 FL (ref 80–99.9)
MCV RBC AUTO: 90.8 FL (ref 80–99.9)
METAMYELOCYTES ABSOLUTE COUNT: 0.15 K/UL (ref 0–0.12)
METAMYELOCYTES: 1 % (ref 0–1)
METHB: 0.3 % (ref 0–1.5)
MODE: ABNORMAL
MONOCYTES NFR BLD: 0.51 K/UL (ref 0.1–0.95)
MONOCYTES NFR BLD: 0.61 K/UL (ref 0.1–0.95)
MONOCYTES NFR BLD: 0.95 K/UL (ref 0.1–0.95)
MONOCYTES NFR BLD: 1.04 K/UL (ref 0.1–0.95)
MONOCYTES NFR BLD: 3 % (ref 2–12)
MONOCYTES NFR BLD: 4 % (ref 2–12)
MONOCYTES NFR BLD: 5 % (ref 2–12)
MONOCYTES NFR BLD: 5 % (ref 2–12)
MYELOCYTES ABSOLUTE COUNT: 0.16 K/UL
MYELOCYTES ABSOLUTE COUNT: 0.51 K/UL
MYELOCYTES: 1 %
MYELOCYTES: 3 %
NEUTROPHILS NFR BLD: 84 % (ref 43–80)
NEUTROPHILS NFR BLD: 87 % (ref 43–80)
NEUTROPHILS NFR BLD: 89 % (ref 43–80)
NEUTROPHILS NFR BLD: 92 % (ref 43–80)
NEUTS SEG NFR BLD: 14.86 K/UL (ref 1.8–7.3)
NEUTS SEG NFR BLD: 15.5 K/UL (ref 1.8–7.3)
NEUTS SEG NFR BLD: 16.87 K/UL (ref 1.8–7.3)
NEUTS SEG NFR BLD: 18.34 K/UL (ref 1.8–7.3)
NUCLEATED RED BLOOD CELLS: 10 PER 100 WBC
NUCLEATED RED BLOOD CELLS: 13 PER 100 WBC
NUCLEATED RED BLOOD CELLS: 20 PER 100 WBC
NUCLEATED RED BLOOD CELLS: 8 PER 100 WBC
O2 SATURATION: 98.5 % (ref 92–98.5)
O2HB: 98 % (ref 94–97)
OPERATOR ID: 8217
PATIENT TEMP: 37 C
PCO2: 46.8 MMHG (ref 35–45)
PEEP/CPAP: 10 CMH2O
PFO2: 1.38 MMHG/%
PH BLOOD GAS: 7.48 (ref 7.35–7.45)
PHOSPHATE SERPL-MCNC: 2.8 MG/DL (ref 2.5–4.5)
PHOSPHATE SERPL-MCNC: 3.2 MG/DL (ref 2.5–4.5)
PHOSPHATE SERPL-MCNC: 3.2 MG/DL (ref 2.5–4.5)
PHOSPHATE SERPL-MCNC: 3.4 MG/DL (ref 2.5–4.5)
PHOSPHATE SERPL-MCNC: 3.7 MG/DL (ref 2.5–4.5)
PLATELET # BLD AUTO: 121 K/UL (ref 130–450)
PLATELET # BLD AUTO: 137 K/UL (ref 130–450)
PLATELET # BLD AUTO: 140 K/UL (ref 130–450)
PLATELET # BLD AUTO: 161 K/UL (ref 130–450)
PMV BLD AUTO: 11.1 FL (ref 7–12)
PMV BLD AUTO: 11.3 FL (ref 7–12)
PMV BLD AUTO: 11.4 FL (ref 7–12)
PMV BLD AUTO: 11.8 FL (ref 7–12)
PO2: 124.2 MMHG (ref 75–100)
POTASSIUM SERPL-SCNC: 3.1 MMOL/L (ref 3.5–5)
POTASSIUM SERPL-SCNC: 3.2 MMOL/L (ref 3.5–5)
POTASSIUM SERPL-SCNC: 3.3 MMOL/L (ref 3.5–5)
POTASSIUM SERPL-SCNC: 3.4 MMOL/L (ref 3.5–5)
POTASSIUM SERPL-SCNC: 3.5 MMOL/L (ref 3.5–5)
PROMYELOCYTES ABSOLUTE COUNT: 0.15 K/UL
PROMYELOCYTES ABSOLUTE COUNT: 0.16 K/UL
PROMYELOCYTES ABSOLUTE COUNT: 0.34 K/UL
PROMYELOCYTES: 1 %
PROMYELOCYTES: 1 %
PROMYELOCYTES: 2 %
PROT SERPL-MCNC: 4.8 G/DL (ref 6.4–8.3)
PROT SERPL-MCNC: 4.8 G/DL (ref 6.4–8.3)
PROT SERPL-MCNC: 5.1 G/DL (ref 6.4–8.3)
PROT SERPL-MCNC: 5.1 G/DL (ref 6.4–8.3)
PROT SERPL-MCNC: 5.2 G/DL (ref 6.4–8.3)
RBC # BLD AUTO: 2.51 M/UL (ref 3.8–5.8)
RBC # BLD AUTO: 2.66 M/UL (ref 3.8–5.8)
RBC # BLD AUTO: 2.72 M/UL (ref 3.8–5.8)
RBC # BLD AUTO: 2.79 M/UL (ref 3.8–5.8)
RBC # BLD: ABNORMAL 10*6/UL
RI(T): 3.78
RR MECHANICAL: 16 B/MIN
SODIUM SERPL-SCNC: 143 MMOL/L (ref 132–146)
SODIUM SERPL-SCNC: 145 MMOL/L (ref 132–146)
SODIUM SERPL-SCNC: 148 MMOL/L (ref 132–146)
SOURCE, BLOOD GAS: ABNORMAL
THB: 8.8 G/DL (ref 11.5–16.5)
TIME ANALYZED: 1526
VT MECHANICAL: 500 ML
WBC # BLD: ABNORMAL 10*3/UL
WBC OTHER # BLD: 17.5 K/UL (ref 4.5–11.5)
WBC OTHER # BLD: 17.7 K/UL (ref 4.5–11.5)
WBC OTHER # BLD: 19.4 K/UL (ref 4.5–11.5)
WBC OTHER # BLD: 19.9 K/UL (ref 4.5–11.5)

## 2024-10-29 PROCEDURE — 2580000003 HC RX 258

## 2024-10-29 PROCEDURE — 94003 VENT MGMT INPAT SUBQ DAY: CPT

## 2024-10-29 PROCEDURE — P9016 RBC LEUKOCYTES REDUCED: HCPCS

## 2024-10-29 PROCEDURE — 82805 BLOOD GASES W/O2 SATURATION: CPT

## 2024-10-29 PROCEDURE — 6360000002 HC RX W HCPCS

## 2024-10-29 PROCEDURE — 37799 UNLISTED PX VASCULAR SURGERY: CPT

## 2024-10-29 PROCEDURE — 82330 ASSAY OF CALCIUM: CPT

## 2024-10-29 PROCEDURE — 2000000000 HC ICU R&B

## 2024-10-29 PROCEDURE — 80053 COMPREHEN METABOLIC PANEL: CPT

## 2024-10-29 PROCEDURE — 85025 COMPLETE CBC W/AUTO DIFF WBC: CPT

## 2024-10-29 PROCEDURE — 36430 TRANSFUSION BLD/BLD COMPNT: CPT

## 2024-10-29 PROCEDURE — 71045 X-RAY EXAM CHEST 1 VIEW: CPT

## 2024-10-29 PROCEDURE — 87070 CULTURE OTHR SPECIMN AEROBIC: CPT

## 2024-10-29 PROCEDURE — 84100 ASSAY OF PHOSPHORUS: CPT

## 2024-10-29 PROCEDURE — 87077 CULTURE AEROBIC IDENTIFY: CPT

## 2024-10-29 PROCEDURE — 99291 CRITICAL CARE FIRST HOUR: CPT | Performed by: SURGERY

## 2024-10-29 PROCEDURE — 6370000000 HC RX 637 (ALT 250 FOR IP)

## 2024-10-29 PROCEDURE — 2500000003 HC RX 250 WO HCPCS

## 2024-10-29 PROCEDURE — 36592 COLLECT BLOOD FROM PICC: CPT

## 2024-10-29 PROCEDURE — 87205 SMEAR GRAM STAIN: CPT

## 2024-10-29 PROCEDURE — 2580000003 HC RX 258: Performed by: INTERNAL MEDICINE

## 2024-10-29 PROCEDURE — 2500000003 HC RX 250 WO HCPCS: Performed by: INTERNAL MEDICINE

## 2024-10-29 PROCEDURE — 82962 GLUCOSE BLOOD TEST: CPT

## 2024-10-29 PROCEDURE — 82550 ASSAY OF CK (CPK): CPT

## 2024-10-29 PROCEDURE — 83735 ASSAY OF MAGNESIUM: CPT

## 2024-10-29 PROCEDURE — 6370000000 HC RX 637 (ALT 250 FOR IP): Performed by: INTERNAL MEDICINE

## 2024-10-29 RX ORDER — POTASSIUM CHLORIDE 29.8 MG/ML
20 INJECTION INTRAVENOUS
Status: COMPLETED | OUTPATIENT
Start: 2024-10-29 | End: 2024-10-29

## 2024-10-29 RX ORDER — HEPARIN SODIUM 5000 [USP'U]/ML
5000 INJECTION, SOLUTION INTRAVENOUS; SUBCUTANEOUS EVERY 8 HOURS SCHEDULED
Status: DISCONTINUED | OUTPATIENT
Start: 2024-10-29 | End: 2024-10-29

## 2024-10-29 RX ORDER — SODIUM CHLORIDE 9 MG/ML
INJECTION, SOLUTION INTRAVENOUS PRN
Status: DISCONTINUED | OUTPATIENT
Start: 2024-10-29 | End: 2024-10-30

## 2024-10-29 RX ORDER — FENTANYL CITRATE 50 UG/ML
50 INJECTION, SOLUTION INTRAMUSCULAR; INTRAVENOUS ONCE
Status: DISCONTINUED | OUTPATIENT
Start: 2024-10-29 | End: 2024-11-03

## 2024-10-29 RX ORDER — OXYCODONE HCL 5 MG/5 ML
10 SOLUTION, ORAL ORAL EVERY 6 HOURS
Status: DISCONTINUED | OUTPATIENT
Start: 2024-10-29 | End: 2024-11-07

## 2024-10-29 RX ORDER — MAGNESIUM SULFATE IN WATER 40 MG/ML
2000 INJECTION, SOLUTION INTRAVENOUS ONCE
Status: COMPLETED | OUTPATIENT
Start: 2024-10-29 | End: 2024-10-29

## 2024-10-29 RX ORDER — HEPARIN SODIUM 5000 [USP'U]/ML
7500 INJECTION, SOLUTION INTRAVENOUS; SUBCUTANEOUS EVERY 8 HOURS SCHEDULED
Status: DISCONTINUED | OUTPATIENT
Start: 2024-10-29 | End: 2024-11-06

## 2024-10-29 RX ORDER — POTASSIUM CHLORIDE 29.8 MG/ML
20 INJECTION INTRAVENOUS
Status: DISCONTINUED | OUTPATIENT
Start: 2024-10-29 | End: 2024-10-29

## 2024-10-29 RX ADMIN — CHLORHEXIDINE GLUCONATE, 0.12% ORAL RINSE 15 ML: 1.2 SOLUTION DENTAL at 09:34

## 2024-10-29 RX ADMIN — CHLORHEXIDINE GLUCONATE, 0.12% ORAL RINSE 15 ML: 1.2 SOLUTION DENTAL at 20:51

## 2024-10-29 RX ADMIN — POTASSIUM CHLORIDE 20 MEQ: 29.8 INJECTION, SOLUTION INTRAVENOUS at 09:33

## 2024-10-29 RX ADMIN — SODIUM CHLORIDE, PRESERVATIVE FREE 10 ML: 5 INJECTION INTRAVENOUS at 20:51

## 2024-10-29 RX ADMIN — MINERAL OIL, WHITE PETROLATUM: .03; .94 OINTMENT OPHTHALMIC at 02:22

## 2024-10-29 RX ADMIN — Medication 150 MCG/HR: at 02:35

## 2024-10-29 RX ADMIN — MINERAL OIL, WHITE PETROLATUM: .03; .94 OINTMENT OPHTHALMIC at 09:35

## 2024-10-29 RX ADMIN — POTASSIUM CHLORIDE 20 MEQ: 29.8 INJECTION, SOLUTION INTRAVENOUS at 20:09

## 2024-10-29 RX ADMIN — MINERAL OIL, WHITE PETROLATUM: .03; .94 OINTMENT OPHTHALMIC at 14:15

## 2024-10-29 RX ADMIN — Medication 150 MCG/HR: at 10:38

## 2024-10-29 RX ADMIN — OXYCODONE HYDROCHLORIDE 10 MG: 5 SOLUTION ORAL at 23:57

## 2024-10-29 RX ADMIN — POTASSIUM CHLORIDE 20 MEQ: 29.8 INJECTION, SOLUTION INTRAVENOUS at 19:03

## 2024-10-29 RX ADMIN — INSULIN LISPRO 1 UNITS: 100 INJECTION, SOLUTION INTRAVENOUS; SUBCUTANEOUS at 10:38

## 2024-10-29 RX ADMIN — MINERAL OIL, WHITE PETROLATUM: .03; .94 OINTMENT OPHTHALMIC at 05:36

## 2024-10-29 RX ADMIN — Medication 150 MCG/HR: at 17:25

## 2024-10-29 RX ADMIN — FENTANYL CITRATE 50 MCG: 50 INJECTION, SOLUTION INTRAMUSCULAR; INTRAVENOUS at 15:10

## 2024-10-29 RX ADMIN — FAMOTIDINE 10 MG: 10 INJECTION, SOLUTION INTRAVENOUS at 09:33

## 2024-10-29 RX ADMIN — PROPOFOL 35 MCG/KG/MIN: 10 INJECTION, EMULSION INTRAVENOUS at 20:08

## 2024-10-29 RX ADMIN — MAGNESIUM SULFATE HEPTAHYDRATE 2000 MG: 40 INJECTION, SOLUTION INTRAVENOUS at 09:28

## 2024-10-29 RX ADMIN — GABAPENTIN 300 MG: 300 CAPSULE ORAL at 09:30

## 2024-10-29 RX ADMIN — POLYVINYL ALCOHOL, POVIDONE 1 DROP: 14; 6 SOLUTION/ DROPS OPHTHALMIC at 20:51

## 2024-10-29 RX ADMIN — CEFEPIME 2000 MG: 2 INJECTION, POWDER, FOR SOLUTION INTRAVENOUS at 23:56

## 2024-10-29 RX ADMIN — POTASSIUM CHLORIDE 20 MEQ: 29.8 INJECTION, SOLUTION INTRAVENOUS at 13:31

## 2024-10-29 RX ADMIN — HYDROCORTISONE SODIUM SUCCINATE 50 MG: 100 INJECTION, POWDER, FOR SOLUTION INTRAMUSCULAR; INTRAVENOUS at 18:24

## 2024-10-29 RX ADMIN — CALCIUM GLUCONATE 2000 MG: 98 INJECTION, SOLUTION INTRAVENOUS at 20:18

## 2024-10-29 RX ADMIN — POTASSIUM CHLORIDE 20 MEQ: 29.8 INJECTION, SOLUTION INTRAVENOUS at 10:37

## 2024-10-29 RX ADMIN — GABAPENTIN 300 MG: 300 CAPSULE ORAL at 20:51

## 2024-10-29 RX ADMIN — PROPOFOL 35 MCG/KG/MIN: 10 INJECTION, EMULSION INTRAVENOUS at 09:03

## 2024-10-29 RX ADMIN — POLYETHYLENE GLYCOL 3350 17 G: 17 POWDER, FOR SOLUTION ORAL at 09:35

## 2024-10-29 RX ADMIN — MINERAL OIL, WHITE PETROLATUM: .03; .94 OINTMENT OPHTHALMIC at 18:24

## 2024-10-29 RX ADMIN — ACETAMINOPHEN 650 MG: 325 TABLET ORAL at 23:56

## 2024-10-29 RX ADMIN — PROPOFOL 35 MCG/KG/MIN: 10 INJECTION, EMULSION INTRAVENOUS at 23:14

## 2024-10-29 RX ADMIN — ACETAMINOPHEN 650 MG: 325 TABLET ORAL at 18:24

## 2024-10-29 RX ADMIN — POTASSIUM CHLORIDE: 2 INJECTION, SOLUTION, CONCENTRATE INTRAVENOUS at 18:56

## 2024-10-29 RX ADMIN — CEFEPIME 2000 MG: 2 INJECTION, POWDER, FOR SOLUTION INTRAVENOUS at 12:42

## 2024-10-29 RX ADMIN — PROPOFOL 35 MCG/KG/MIN: 10 INJECTION, EMULSION INTRAVENOUS at 12:15

## 2024-10-29 RX ADMIN — ACETAMINOPHEN 650 MG: 325 TABLET ORAL at 05:34

## 2024-10-29 RX ADMIN — HYDROCORTISONE SODIUM SUCCINATE 50 MG: 100 INJECTION, POWDER, FOR SOLUTION INTRAMUSCULAR; INTRAVENOUS at 05:36

## 2024-10-29 RX ADMIN — PROPOFOL 35.02 MCG/KG/MIN: 10 INJECTION, EMULSION INTRAVENOUS at 05:27

## 2024-10-29 RX ADMIN — SODIUM CHLORIDE, PRESERVATIVE FREE 40 MG: 5 INJECTION INTRAVENOUS at 12:58

## 2024-10-29 RX ADMIN — PROPOFOL 35 MCG/KG/MIN: 10 INJECTION, EMULSION INTRAVENOUS at 15:38

## 2024-10-29 RX ADMIN — HEPARIN SODIUM 5000 UNITS: 5000 INJECTION INTRAVENOUS; SUBCUTANEOUS at 05:35

## 2024-10-29 RX ADMIN — ACETAMINOPHEN 650 MG: 325 TABLET ORAL at 12:03

## 2024-10-29 RX ADMIN — HEPARIN SODIUM 7500 UNITS: 5000 INJECTION INTRAVENOUS; SUBCUTANEOUS at 21:34

## 2024-10-29 RX ADMIN — PROPOFOL 35.02 MCG/KG/MIN: 10 INJECTION, EMULSION INTRAVENOUS at 02:14

## 2024-10-29 RX ADMIN — POTASSIUM CHLORIDE 20 MEQ: 29.8 INJECTION, SOLUTION INTRAVENOUS at 12:44

## 2024-10-29 RX ADMIN — METHOCARBAMOL 1000 MG: 500 TABLET ORAL at 09:34

## 2024-10-29 RX ADMIN — CALCIUM GLUCONATE 2000 MG: 98 INJECTION, SOLUTION INTRAVENOUS at 09:22

## 2024-10-29 RX ADMIN — HYDROCORTISONE SODIUM SUCCINATE 50 MG: 100 INJECTION, POWDER, FOR SOLUTION INTRAMUSCULAR; INTRAVENOUS at 23:56

## 2024-10-29 RX ADMIN — HYDROCORTISONE SODIUM SUCCINATE 50 MG: 100 INJECTION, POWDER, FOR SOLUTION INTRAMUSCULAR; INTRAVENOUS at 12:03

## 2024-10-29 RX ADMIN — OXYCODONE HYDROCHLORIDE 10 MG: 5 SOLUTION ORAL at 18:59

## 2024-10-29 RX ADMIN — SODIUM CHLORIDE, POTASSIUM CHLORIDE, SODIUM LACTATE AND CALCIUM CHLORIDE: 600; 310; 30; 20 INJECTION, SOLUTION INTRAVENOUS at 02:17

## 2024-10-29 RX ADMIN — HEPARIN SODIUM 7500 UNITS: 5000 INJECTION INTRAVENOUS; SUBCUTANEOUS at 16:33

## 2024-10-29 ASSESSMENT — PULMONARY FUNCTION TESTS
PIF_VALUE: 27
PIF_VALUE: 28
PIF_VALUE: 26
PIF_VALUE: 20
PIF_VALUE: 33
PIF_VALUE: 32
PIF_VALUE: 38
PIF_VALUE: 26
PIF_VALUE: 25
PIF_VALUE: 41
PIF_VALUE: 27
PIF_VALUE: 41
PIF_VALUE: 26
PIF_VALUE: 29
PIF_VALUE: 26
PIF_VALUE: 20
PIF_VALUE: 27
PIF_VALUE: 26
PIF_VALUE: 33
PIF_VALUE: 28
PIF_VALUE: 24
PIF_VALUE: 24
PIF_VALUE: 27
PIF_VALUE: 31
PIF_VALUE: 27
PIF_VALUE: 19
PIF_VALUE: 26

## 2024-10-29 ASSESSMENT — PAIN SCALES - GENERAL
PAINLEVEL_OUTOF10: 0

## 2024-10-29 ASSESSMENT — PAIN - FUNCTIONAL ASSESSMENT: PAIN_FUNCTIONAL_ASSESSMENT: PREVENTS OR INTERFERES WITH ALL ACTIVE AND SOME PASSIVE ACTIVITIES

## 2024-10-29 NOTE — PROGRESS NOTES
Patient was intubated. Paused to pray.  If you need additional support, you may reach out to us at [facility], x [phone].     Brandyn Red; DAR [place]

## 2024-10-29 NOTE — DISCHARGE INSTRUCTIONS
Apply xeroform gauze daily to scalp laceration    TRAUMA SERVICES DISCHARGE INSTRUCTIONS    Call [phone], option 2, for any questions/concerns and for follow-up appointment in 1 month(s).    Please follow the instructions checked below:    During the course of your workup, we identified an incidental finding of:  none  Please follow-up with your primary care provider.    ACTIVITY INSTRUCTIONS  Increase activity as tolerated  No heavy lifting or strenuous activity  Take your incentive spirometer home and use 4-6 times/day   [x]  No driving until cleared by ortho and trauma    WOUND/DRESSING INSTRUCTIONS:  You may shower.  No sitting in bath tub, hot tub or swimming until cleared by physician.  Ice to areas of pain for first 24 hours.  Heat to areas of pain after that.  Wash areas of lacerations/abrasions with soap & water.  Rinse well.  Pat dry with clean towel.  Apply thin layer of Bacitracin, Neosporin, or triple antibiotic cream to affected area 2-3 times per day.  Keep wounds clean and dry.      MEDICATION INSTRUCTIONS  Take medication as prescribed.  When taking pain medications, you may experience dizziness or drowsiness.  Do not drink alcohol or drive when taking these medications.  You may experience constipation while taking pain medication.  You may take over the counter stool softeners such as docusate (Colace), sennosides S (Senokot-S), or Miralax.   [x]  You may take Ibuprofen (over the counter) as directed for mild pain.     --You may take up to 800mg every 8 hours for pain, please take with food or milk.   [x]  You may take acetaminophen (Tylenol) products.  Do NOT take more than 4000mg of Tylenol in 24h.   [x]  Do not take any other acetaminophen (Tylenol) products if you are taking Percocet or Norco, as these contain Tylenol.   --Do NOT take more than 4000mg of Tylenol in 24h.    OPIOID MEDICATION INSTRUCTIONS  Read the medication guide that is included with your prescription.  Take your  weight bearing status is determined by your surgeon. For a significant portion of lower extremity and upper extremity fractures, this will be a non-weightbearing or touch-down weight bearing status. Usually this is continued for 6-10 weeks before you are allowed to start weightbearing.    Weightbearing as tolerated (WBAT)  This means that you can put as much weight as you can tolerated through your arm or leg. The key is \"AS TOLERATED\". You should not push through the pain and use your pain as a guide. Often times you will have a brace to provide increased stability.    Partial Weightbearing (PWB)  This status allows you to put some weight through your leg. Usually it is a percentage of full body weight. It is important you do not increase the amount of weight above what you are told to do.    Touchdown Weightbearing (TDWB)  This allows you to use your leg for balance, but does not allow you to put weight on your leg. It is often used with fractures higher up in the leg (hip or pelvis) to decrease the strain on the muscles around the hip.    Non-weightbearing (NWB)  You are not allowed to put any weight on your leg or arm. This is usually used when the fixation (i.e. plates and screws) cannot withstand repetitive stress of walking or lifting. The bone must be allowed to heal some before you can do more.

## 2024-10-29 NOTE — PLAN OF CARE
Problem: Safety - Medical Restraint  Goal: Remains free of injury from restraints (Restraint for Interference with Medical Device)  Description: INTERVENTIONS:  1. Determine that other, less restrictive measures have been tried or would not be effective before applying the restraint  2. Evaluate the patient's condition at the time of restraint application  3. Inform patient/family regarding the reason for restraint  4. Q2H: Monitor safety, psychosocial status, comfort, nutrition and hydration  Outcome: Progressing    Problem: Discharge Planning  Goal: Discharge to home or other facility with appropriate resources  Outcome: Not Progressing     Problem: Neurosensory - Adult  Goal: Achieves maximal functionality and self care  Outcome: Not Progressing     Problem: Respiratory - Adult  Goal: Achieves optimal ventilation and oxygenation  Outcome: Not Progressing     Problem: Musculoskeletal - Adult  Goal: Return mobility to safest level of function  Outcome: Not Progressing  Goal: Return ADL status to a safe level of function  Outcome: Not Progressing     Problem: Metabolic/Fluid and Electrolytes - Adult  Goal: Electrolytes maintained within normal limits  Outcome: Not Progressing
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
## 2024-10-29 NOTE — PLAN OF CARE
Problem: Discharge Planning  Goal: Discharge to home or other facility with appropriate resources  10/28/2024 1339 by Krista Ceja RN  Outcome: Not Progressing     Problem: Pain  Goal: Verbalizes/displays adequate comfort level or baseline comfort level  10/28/2024 1339 by Krista Ceja RN  Outcome: Not Progressing     Problem: Safety - Adult  Goal: Free from fall injury  10/28/2024 1339 by Krista Ceja RN  Outcome: Progressing     Problem: Skin/Tissue Integrity  Goal: Absence of new skin breakdown  Description: 1.  Monitor for areas of redness and/or skin breakdown  2.  Assess vascular access sites hourly  3.  Every 4-6 hours minimum:  Change oxygen saturation probe site  4.  Every 4-6 hours:  If on nasal continuous positive airway pressure, respiratory therapy assess nares and determine need for appliance change or resting period.  10/28/2024 1339 by Krista Ceja RN  Outcome: Progressing     Problem: ABCDS Injury Assessment  Goal: Absence of physical injury  10/28/2024 1339 by Krista Ceja RN  Outcome: Progressing     Problem: Safety - Medical Restraint  Goal: Remains free of injury from restraints (Restraint for Interference with Medical Device)  Description: INTERVENTIONS:  1. Determine that other, less restrictive measures have been tried or would not be effective before applying the restraint  2. Evaluate the patient's condition at the time of restraint application  3. Inform patient/family regarding the reason for restraint  4. Q2H: Monitor safety, psychosocial status, comfort, nutrition and hydration  10/29/2024 0113 by Pam Walter RN  Outcome: Progressing  Flowsheets (Taken 10/28/2024 2000)  Remains free of injury from restraints (restraint for interference with medical device): Every 2 hours: Monitor safety, psychosocial status, comfort, nutrition and hydration  10/28/2024 1339 by Krista Ceja RN  Outcome: Progressing  Flowsheets  Taken 10/28/2024 0400 by Bianca Angel RN  Remains free of

## 2024-10-29 NOTE — FLOWSHEET NOTE
Patient will reach for lines, tubes, and equipment and fails to redirect to repeated verbal commands. Pt is impulsive with poor safety awareness. Bilateral soft wrist restraints secured for patient safety.

## 2024-10-29 NOTE — FLOWSHEET NOTE
Patient intubated and becoming restless at times attempting to reach for lines and tubes for critical care management despite attempts of redirection/reorientation. Will continue use of bilateral soft wrist restraints at this time. Will continue to assess and monitor for earliest removal.

## 2024-10-29 NOTE — PROGRESS NOTES
10/29/24 0841   Patient Observation   Pulse (!) 110   Respirations 19   SpO2 98 %   Vent Information   Ventilator ID [id number]   Vent Mode AC/PRVC   Ventilator Settings   FiO2  90 %   Insp Time (sec) 0.78 sec   Vt (Set, mL) 500 mL   Resp Rate (Set) 16 bpm   PEEP/CPAP (cmH2O) 10   Vent Patient Data (Readings)   Vt (Measured) 1027 mL   Peak Inspiratory Pressure (cmH2O) 26 cmH2O   Rate Measured 19 br/min   Minute Volume (L/min) 10.6 Liters   Mean Airway Pressure (cmH2O) 15 cmH20   Plateau Pressure (cm H2O) 22 cm H2O   Driving Pressure 12   Inspiratory Time 0.78 sec   I:E Ratio 1:3.80   Flow Sensitivity 2 L/min   PEEP Intrinsic (cm H2O) 0.9 cm H2O   Static Compliance (L/cm H2O) 29   I Time/ I Time % 0.78 s   Backup Apnea On   Backup Rate 16 Breaths Per Minute   Backup Vt 500   Vent Alarm Settings   Low Pressure (cmH2O) 13.5 cmH2O   High Pressure (cmH2O) 45 cmH2O   Low Minute Volume (lpm) 7 L/min   High Minute Volume (lpm) 16 L/min   Low Exhaled Vt (ml) 400 mL   High Exhaled Vt (ml) 900 mL   RR High (bpm) 30 br/min   Apnea (secs) 20 secs   Additional Respiratoray Assessments   Humidification Source Heated wire   Humidification Temp 37   Ambu Bag With Mask At Bedside Yes   ETT    Placement Date/Time: 10/24/24 1925   Present on Admission/Arrival: No  Placed By: (c)   Placement Verified By: Auscultation;Chest X-ray  Preoxygenation: Yes  Mask Ventilation: Ventilated by mask (1)  Technique: Video laryngoscopy  Airway Type: Cuffed ...   Secured At 25 cm   Measured From Lips   ETT Placement Left   Secured By Commercial tube rebolledo

## 2024-10-29 NOTE — PROGRESS NOTES
10/29/24 1104   Patient Observation   Pulse (!) 109   Respirations 20   SpO2 97 %   Vent Information   Ventilator ID [id number]   Vent Mode AC/PRVC   Ventilator Settings   FiO2  90 %   Insp Time (sec) 0.78 sec   Vt (Set, mL) 500 mL   Resp Rate (Set) 16 bpm   PEEP/CPAP (cmH2O) 10   Vent Patient Data (Readings)   Vt (Measured) 294 mL   Peak Inspiratory Pressure (cmH2O) 27 cmH2O   Rate Measured 17 br/min   Minute Volume (L/min) 8.6 Liters   Mean Airway Pressure (cmH2O) 15 cmH20   Plateau Pressure (cm H2O) 22 cm H2O   Driving Pressure 12   Inspiratory Time 0.78 sec   I:E Ratio 1:1.20   Pressure Sensitivity 3 cm H2O   Static Compliance (L/cm H2O) 63   I Time/ I Time % 0.78 s   Backup Apnea On   Backup Rate 16 Breaths Per Minute   Backup Vt 500   Vent Alarm Settings   Low Pressure (cmH2O) 13.5 cmH2O   High Pressure (cmH2O) 45 cmH2O   Low Minute Volume (lpm) 7 L/min   High Minute Volume (lpm) 16 L/min   Low Exhaled Vt (ml) 400 mL   High Exhaled Vt (ml) 900 mL   RR High (bpm) 30 br/min   Apnea (secs) 20 secs   Additional Respiratoray Assessments   Humidification Source Heated wire   Humidification Temp 36.7   Ambu Bag With Mask At Bedside Yes   ETT    Placement Date/Time: 10/24/24 1925   Present on Admission/Arrival: No  Placed By: (c)   Placement Verified By: Auscultation;Chest X-ray  Preoxygenation: Yes  Mask Ventilation: Ventilated by mask (1)  Technique: Video laryngoscopy  Airway Type: Cuffed ...   Secured At 25 cm   Measured From Lips   ETT Placement Left   Secured By Commercial tube rebolledo
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
## 2024-10-30 ENCOUNTER — APPOINTMENT (OUTPATIENT)
Dept: GENERAL RADIOLOGY | Age: 49
End: 2024-10-30
Payer: MEDICARE

## 2024-10-30 ENCOUNTER — ANESTHESIA (OUTPATIENT)
Dept: OPERATING ROOM | Age: 49
End: 2024-10-30
Payer: MEDICARE

## 2024-10-30 ENCOUNTER — ANESTHESIA EVENT (OUTPATIENT)
Dept: OPERATING ROOM | Age: 49
End: 2024-10-30
Payer: MEDICARE

## 2024-10-30 LAB
AADO2: 465.6 MMHG
ALBUMIN SERPL-MCNC: 2.4 G/DL (ref 3.5–5.2)
ALBUMIN SERPL-MCNC: 2.4 G/DL (ref 3.5–5.2)
ALBUMIN SERPL-MCNC: 2.5 G/DL (ref 3.5–5.2)
ALP SERPL-CCNC: 150 U/L (ref 40–129)
ALP SERPL-CCNC: 155 U/L (ref 40–129)
ALP SERPL-CCNC: 156 U/L (ref 40–129)
ALP SERPL-CCNC: 158 U/L (ref 40–129)
ALP SERPL-CCNC: 196 U/L (ref 40–129)
ALT SERPL-CCNC: 269 U/L (ref 0–40)
ALT SERPL-CCNC: 303 U/L (ref 0–40)
ALT SERPL-CCNC: 352 U/L (ref 0–40)
ALT SERPL-CCNC: 406 U/L (ref 0–40)
ALT SERPL-CCNC: 410 U/L (ref 0–40)
ANION GAP SERPL CALCULATED.3IONS-SCNC: 11 MMOL/L (ref 7–16)
ANION GAP SERPL CALCULATED.3IONS-SCNC: 11 MMOL/L (ref 7–16)
ANION GAP SERPL CALCULATED.3IONS-SCNC: 4 MMOL/L (ref 7–16)
ANION GAP SERPL CALCULATED.3IONS-SCNC: 8 MMOL/L (ref 7–16)
ANION GAP SERPL CALCULATED.3IONS-SCNC: 8 MMOL/L (ref 7–16)
AST SERPL-CCNC: 179 U/L (ref 0–39)
AST SERPL-CCNC: 182 U/L (ref 0–39)
AST SERPL-CCNC: 197 U/L (ref 0–39)
AST SERPL-CCNC: 217 U/L (ref 0–39)
AST SERPL-CCNC: 218 U/L (ref 0–39)
ATYPICAL LYMPHOCYTE ABSOLUTE COUNT: 0.15 K/UL (ref 0–0.46)
ATYPICAL LYMPHOCYTES: 1 % (ref 0–4)
B.E.: 10.5 MMOL/L (ref -3–3)
BASOPHILS # BLD: 0 K/UL (ref 0–0.2)
BASOPHILS # BLD: 0 K/UL (ref 0–0.2)
BASOPHILS # BLD: 0.18 K/UL (ref 0–0.2)
BASOPHILS # BLD: 0.29 K/UL (ref 0–0.2)
BASOPHILS # BLD: 0.35 K/UL (ref 0–0.2)
BASOPHILS NFR BLD: 0 % (ref 0–2)
BASOPHILS NFR BLD: 0 % (ref 0–2)
BASOPHILS NFR BLD: 1 % (ref 0–2)
BASOPHILS NFR BLD: 2 % (ref 0–2)
BASOPHILS NFR BLD: 2 % (ref 0–2)
BILIRUB DIRECT SERPL-MCNC: 1.3 MG/DL (ref 0–0.3)
BILIRUB INDIRECT SERPL-MCNC: 0.5 MG/DL (ref 0–1)
BILIRUB SERPL-MCNC: 1.6 MG/DL (ref 0–1.2)
BILIRUB SERPL-MCNC: 1.8 MG/DL (ref 0–1.2)
BILIRUB SERPL-MCNC: 1.9 MG/DL (ref 0–1.2)
BILIRUB SERPL-MCNC: 2 MG/DL (ref 0–1.2)
BILIRUB SERPL-MCNC: 2.1 MG/DL (ref 0–1.2)
BLASTS ABSOLUTE COUNT: 0.18 K/UL
BLASTS: 1 %
BUN BLD-MCNC: 47 MG/DL (ref 6–20)
BUN SERPL-MCNC: 50 MG/DL (ref 6–20)
BUN SERPL-MCNC: 53 MG/DL (ref 6–20)
CA-I BLD-SCNC: 1.09 MMOL/L (ref 1.15–1.33)
CA-I BLD-SCNC: 1.09 MMOL/L (ref 1.15–1.33)
CA-I BLD-SCNC: 1.1 MMOL/L (ref 1.15–1.33)
CA-I BLD-SCNC: 1.1 MMOL/L (ref 1.15–1.33)
CA-I BLD-SCNC: 1.12 MMOL/L (ref 1.15–1.33)
CALCIUM SERPL-MCNC: 7.8 MG/DL (ref 8.6–10.2)
CALCIUM SERPL-MCNC: 7.9 MG/DL (ref 8.6–10.2)
CALCIUM SERPL-MCNC: 8.3 MG/DL (ref 8.6–10.2)
CALCIUM SERPL-MCNC: 8.4 MG/DL (ref 8.6–10.2)
CALCIUM SERPL-MCNC: 8.5 MG/DL (ref 8.6–10.2)
CHLORIDE BLD-SCNC: 103 MMOL/L (ref 100–108)
CHLORIDE SERPL-SCNC: 101 MMOL/L (ref 98–107)
CHLORIDE SERPL-SCNC: 102 MMOL/L (ref 98–107)
CHLORIDE SERPL-SCNC: 104 MMOL/L (ref 98–107)
CHLORIDE SERPL-SCNC: 105 MMOL/L (ref 98–107)
CHLORIDE SERPL-SCNC: 99 MMOL/L (ref 98–107)
CK SERPL-CCNC: 3599 U/L (ref 20–200)
CO2 BLD CALC-SCNC: 40 MMOL/L (ref 22–29)
CO2 SERPL-SCNC: 35 MMOL/L (ref 22–29)
CO2 SERPL-SCNC: 35 MMOL/L (ref 22–29)
CO2 SERPL-SCNC: 36 MMOL/L (ref 22–29)
CO2 SERPL-SCNC: 36 MMOL/L (ref 22–29)
CO2 SERPL-SCNC: 40 MMOL/L (ref 22–29)
COHB: 0.7 % (ref 0–1.5)
CREAT BLD-MCNC: 1.5 MG/DL (ref 0.7–1.2)
CREAT SERPL-MCNC: 1.2 MG/DL (ref 0.7–1.2)
CREAT SERPL-MCNC: 1.3 MG/DL (ref 0.7–1.2)
CREAT SERPL-MCNC: 1.4 MG/DL (ref 0.7–1.2)
CRITICAL: ABNORMAL
DATE ANALYZED: ABNORMAL
DATE OF COLLECTION: ABNORMAL
EGFR, POC: 56 ML/MIN/1.73M2
EOSINOPHIL # BLD: 0 K/UL (ref 0.05–0.5)
EOSINOPHIL # BLD: 0 K/UL (ref 0.05–0.5)
EOSINOPHIL # BLD: 0.15 K/UL (ref 0.05–0.5)
EOSINOPHIL # BLD: 0.16 K/UL (ref 0.05–0.5)
EOSINOPHIL # BLD: 0.18 K/UL (ref 0.05–0.5)
EOSINOPHILS RELATIVE PERCENT: 0 % (ref 0–6)
EOSINOPHILS RELATIVE PERCENT: 0 % (ref 0–6)
EOSINOPHILS RELATIVE PERCENT: 1 % (ref 0–6)
ERYTHROCYTE [DISTWIDTH] IN BLOOD BY AUTOMATED COUNT: 16.8 % (ref 11.5–15)
ERYTHROCYTE [DISTWIDTH] IN BLOOD BY AUTOMATED COUNT: 17 % (ref 11.5–15)
ERYTHROCYTE [DISTWIDTH] IN BLOOD BY AUTOMATED COUNT: 17.1 % (ref 11.5–15)
ERYTHROCYTE [DISTWIDTH] IN BLOOD BY AUTOMATED COUNT: 17.2 % (ref 11.5–15)
ERYTHROCYTE [DISTWIDTH] IN BLOOD BY AUTOMATED COUNT: 17.2 % (ref 11.5–15)
FIO2: 85 %
GFR, ESTIMATED: 60 ML/MIN/1.73M2
GFR, ESTIMATED: 61 ML/MIN/1.73M2
GFR, ESTIMATED: 62 ML/MIN/1.73M2
GFR, ESTIMATED: 69 ML/MIN/1.73M2
GFR, ESTIMATED: 72 ML/MIN/1.73M2
GLUCOSE BLD-MCNC: 181 MG/DL (ref 74–99)
GLUCOSE BLD-MCNC: 192 MG/DL (ref 74–99)
GLUCOSE BLD-MCNC: 198 MG/DL (ref 74–99)
GLUCOSE BLD-MCNC: 202 MG/DL (ref 74–99)
GLUCOSE BLD-MCNC: 210 MG/DL (ref 74–99)
GLUCOSE BLD-MCNC: 214 MG/DL (ref 74–99)
GLUCOSE BLD-MCNC: 218 MG/DL (ref 74–99)
GLUCOSE SERPL-MCNC: 198 MG/DL (ref 74–99)
GLUCOSE SERPL-MCNC: 205 MG/DL (ref 74–99)
GLUCOSE SERPL-MCNC: 215 MG/DL (ref 74–99)
GLUCOSE SERPL-MCNC: 216 MG/DL (ref 74–99)
GLUCOSE SERPL-MCNC: 218 MG/DL (ref 74–99)
HCO3: 35 MMOL/L (ref 22–26)
HCT VFR BLD AUTO: 25 % (ref 37–54)
HCT VFR BLD AUTO: 25 % (ref 37–54)
HCT VFR BLD AUTO: 25.3 % (ref 37–54)
HCT VFR BLD AUTO: 25.8 % (ref 37–54)
HCT VFR BLD AUTO: 26.8 % (ref 37–54)
HCT VFR BLD AUTO: 26.9 % (ref 37–54)
HGB BLD-MCNC: 7.9 G/DL (ref 12.5–16.5)
HGB BLD-MCNC: 8 G/DL (ref 12.5–16.5)
HGB BLD-MCNC: 8.1 G/DL (ref 12.5–16.5)
HGB BLD-MCNC: 8.5 G/DL (ref 12.5–16.5)
HGB BLD-MCNC: 8.6 G/DL (ref 12.5–16.5)
HHB: 2.6 % (ref 0–5)
LAB: ABNORMAL
LYMPHOCYTES NFR BLD: 0.64 K/UL (ref 1.5–4)
LYMPHOCYTES NFR BLD: 0.73 K/UL (ref 1.5–4)
LYMPHOCYTES NFR BLD: 1.29 K/UL (ref 1.5–4)
LYMPHOCYTES NFR BLD: 1.73 K/UL (ref 1.5–4)
LYMPHOCYTES NFR BLD: 2.2 K/UL (ref 1.5–4)
LYMPHOCYTES RELATIVE PERCENT: 13 % (ref 20–42)
LYMPHOCYTES RELATIVE PERCENT: 4 % (ref 20–42)
LYMPHOCYTES RELATIVE PERCENT: 4 % (ref 20–42)
LYMPHOCYTES RELATIVE PERCENT: 6 % (ref 20–42)
LYMPHOCYTES RELATIVE PERCENT: 9 % (ref 20–42)
Lab: 540
MAGNESIUM SERPL-MCNC: 1.8 MG/DL (ref 1.6–2.6)
MAGNESIUM SERPL-MCNC: 1.8 MG/DL (ref 1.6–2.6)
MAGNESIUM SERPL-MCNC: 1.9 MG/DL (ref 1.6–2.6)
MAGNESIUM SERPL-MCNC: 1.9 MG/DL (ref 1.6–2.6)
MCH RBC QN AUTO: 28.4 PG (ref 26–35)
MCH RBC QN AUTO: 28.6 PG (ref 26–35)
MCH RBC QN AUTO: 28.9 PG (ref 26–35)
MCH RBC QN AUTO: 29.1 PG (ref 26–35)
MCH RBC QN AUTO: 29.1 PG (ref 26–35)
MCHC RBC AUTO-ENTMCNC: 31.2 G/DL (ref 32–34.5)
MCHC RBC AUTO-ENTMCNC: 31.4 G/DL (ref 32–34.5)
MCHC RBC AUTO-ENTMCNC: 31.7 G/DL (ref 32–34.5)
MCHC RBC AUTO-ENTMCNC: 32 G/DL (ref 32–34.5)
MCHC RBC AUTO-ENTMCNC: 32 G/DL (ref 32–34.5)
MCV RBC AUTO: 90.9 FL (ref 80–99.9)
MCV RBC AUTO: 90.9 FL (ref 80–99.9)
MCV RBC AUTO: 91 FL (ref 80–99.9)
MCV RBC AUTO: 91.2 FL (ref 80–99.9)
MCV RBC AUTO: 91.2 FL (ref 80–99.9)
METAMYELOCYTES ABSOLUTE COUNT: 0.18 K/UL (ref 0–0.12)
METAMYELOCYTES ABSOLUTE COUNT: 0.32 K/UL (ref 0–0.12)
METAMYELOCYTES ABSOLUTE COUNT: 1.1 K/UL (ref 0–0.12)
METAMYELOCYTES: 1 % (ref 0–1)
METAMYELOCYTES: 2 % (ref 0–1)
METAMYELOCYTES: 5 % (ref 0–1)
METHB: 0.8 % (ref 0–1.5)
MODE: AC
MONOCYTES NFR BLD: 0.74 K/UL (ref 0.1–0.95)
MONOCYTES NFR BLD: 1.27 K/UL (ref 0.1–0.95)
MONOCYTES NFR BLD: 1.28 K/UL (ref 0.1–0.95)
MONOCYTES NFR BLD: 1.38 K/UL (ref 0.1–0.95)
MONOCYTES NFR BLD: 13 % (ref 2–12)
MONOCYTES NFR BLD: 2.2 K/UL (ref 0.1–0.95)
MONOCYTES NFR BLD: 4 % (ref 2–12)
MONOCYTES NFR BLD: 6 % (ref 2–12)
MONOCYTES NFR BLD: 7 % (ref 2–12)
MONOCYTES NFR BLD: 7 % (ref 2–12)
MYELOCYTES ABSOLUTE COUNT: 0.18 K/UL
MYELOCYTES ABSOLUTE COUNT: 0.36 K/UL
MYELOCYTES ABSOLUTE COUNT: 0.86 K/UL
MYELOCYTES ABSOLUTE COUNT: 1.12 K/UL
MYELOCYTES: 1 %
MYELOCYTES: 2 %
MYELOCYTES: 4 %
MYELOCYTES: 6 %
NEUTROPHILS NFR BLD: 70 % (ref 43–80)
NEUTROPHILS NFR BLD: 76 % (ref 43–80)
NEUTROPHILS NFR BLD: 81 % (ref 43–80)
NEUTROPHILS NFR BLD: 82 % (ref 43–80)
NEUTROPHILS NFR BLD: 85 % (ref 43–80)
NEUTS SEG NFR BLD: 11.42 K/UL (ref 1.8–7.3)
NEUTS SEG NFR BLD: 14.5 K/UL (ref 1.8–7.3)
NEUTS SEG NFR BLD: 14.84 K/UL (ref 1.8–7.3)
NEUTS SEG NFR BLD: 16.92 K/UL (ref 1.8–7.3)
NEUTS SEG NFR BLD: 17.5 K/UL (ref 1.8–7.3)
NUCLEATED RED BLOOD CELLS: 10 PER 100 WBC
NUCLEATED RED BLOOD CELLS: 21 PER 100 WBC
NUCLEATED RED BLOOD CELLS: 23 PER 100 WBC
O2 SATURATION: 97.4 % (ref 92–98.5)
O2HB: 95.9 % (ref 94–97)
OPERATOR ID: 2067
PATIENT TEMP: 37 C
PCO2: 47.2 MMHG (ref 35–45)
PEEP/CPAP: 10 CMH2O
PFO2: 1.08 MMHG/%
PH BLOOD GAS: 7.49 (ref 7.35–7.45)
PHOSPHATE SERPL-MCNC: 2.8 MG/DL (ref 2.5–4.5)
PHOSPHATE SERPL-MCNC: 3 MG/DL (ref 2.5–4.5)
PHOSPHATE SERPL-MCNC: 3.1 MG/DL (ref 2.5–4.5)
PHOSPHATE SERPL-MCNC: 3.8 MG/DL (ref 2.5–4.5)
PLATELET # BLD AUTO: 184 K/UL (ref 130–450)
PLATELET # BLD AUTO: 197 K/UL (ref 130–450)
PLATELET # BLD AUTO: 204 K/UL (ref 130–450)
PLATELET # BLD AUTO: 212 K/UL (ref 130–450)
PLATELET # BLD AUTO: 222 K/UL (ref 130–450)
PMV BLD AUTO: 10.9 FL (ref 7–12)
PMV BLD AUTO: 11 FL (ref 7–12)
PMV BLD AUTO: 11.1 FL (ref 7–12)
PMV BLD AUTO: 11.5 FL (ref 7–12)
PMV BLD AUTO: 11.6 FL (ref 7–12)
PO2: 91.5 MMHG (ref 75–100)
POC ANION GAP: 4 MMOL/L (ref 7–16)
POC HCO3: 39.2 MMOL/L (ref 22–26)
POC HEMOGLOBIN (CALC): 8.6 G/DL (ref 12.5–15.5)
POC LACTIC ACID: 1.5 MMOL/L (ref 0.5–2.2)
POC O2 SATURATION: 94.7 % (ref 92–98.5)
POC PCO2: 54.4 MM HG (ref 35–45)
POC PH: 7.47 (ref 7.35–7.45)
POC PO2: 71.8 MM HG (ref 80–100)
POSITIVE BASE EXCESS, ART: 13.9 MMOL/L (ref 0–3)
POTASSIUM BLD-SCNC: 3.1 MMOL/L (ref 3.5–5)
POTASSIUM SERPL-SCNC: 3 MMOL/L (ref 3.5–5)
POTASSIUM SERPL-SCNC: 3.2 MMOL/L (ref 3.5–5)
POTASSIUM SERPL-SCNC: 3.2 MMOL/L (ref 3.5–5)
POTASSIUM SERPL-SCNC: 3.6 MMOL/L (ref 3.5–5)
POTASSIUM SERPL-SCNC: 3.6 MMOL/L (ref 3.5–5)
PROMYELOCYTES ABSOLUTE COUNT: 0.35 K/UL
PROMYELOCYTES ABSOLUTE COUNT: 0.36 K/UL
PROMYELOCYTES: 2 %
PROMYELOCYTES: 2 %
PROT SERPL-MCNC: 4.9 G/DL (ref 6.4–8.3)
PROT SERPL-MCNC: 4.9 G/DL (ref 6.4–8.3)
PROT SERPL-MCNC: 5.1 G/DL (ref 6.4–8.3)
PROT SERPL-MCNC: 5.3 G/DL (ref 6.4–8.3)
PROT SERPL-MCNC: 5.3 G/DL (ref 6.4–8.3)
RBC # BLD AUTO: 2.75 M/UL (ref 3.8–5.8)
RBC # BLD AUTO: 2.78 M/UL (ref 3.8–5.8)
RBC # BLD AUTO: 2.83 M/UL (ref 3.8–5.8)
RBC # BLD AUTO: 2.94 M/UL (ref 3.8–5.8)
RBC # BLD AUTO: 2.96 M/UL (ref 3.8–5.8)
RBC # BLD: ABNORMAL 10*6/UL
RI(T): 5.09
RR MECHANICAL: 16 B/MIN
SODIUM BLD-SCNC: 147 MMOL/L (ref 132–146)
SODIUM SERPL-SCNC: 146 MMOL/L (ref 132–146)
SODIUM SERPL-SCNC: 146 MMOL/L (ref 132–146)
SODIUM SERPL-SCNC: 147 MMOL/L (ref 132–146)
SODIUM SERPL-SCNC: 147 MMOL/L (ref 132–146)
SODIUM SERPL-SCNC: 149 MMOL/L (ref 132–146)
SOURCE, BLOOD GAS: ABNORMAL
THB: 9.6 G/DL (ref 11.5–16.5)
TIME ANALYZED: 545
TRIGL SERPL-MCNC: 381 MG/DL
VT MECHANICAL: 500 ML
WBC # BLD: ABNORMAL 10*3/UL
WBC # BLD: ABNORMAL 10*3/UL
WBC OTHER # BLD: 16.4 K/UL (ref 4.5–11.5)
WBC OTHER # BLD: 18 K/UL (ref 4.5–11.5)
WBC OTHER # BLD: 19.5 K/UL (ref 4.5–11.5)
WBC OTHER # BLD: 20.6 K/UL (ref 4.5–11.5)
WBC OTHER # BLD: 20.6 K/UL (ref 4.5–11.5)

## 2024-10-30 PROCEDURE — 6360000002 HC RX W HCPCS

## 2024-10-30 PROCEDURE — 82962 GLUCOSE BLOOD TEST: CPT

## 2024-10-30 PROCEDURE — 84478 ASSAY OF TRIGLYCERIDES: CPT

## 2024-10-30 PROCEDURE — 3600000005 HC SURGERY LEVEL 5 BASE: Performed by: ORTHOPAEDIC SURGERY

## 2024-10-30 PROCEDURE — 36592 COLLECT BLOOD FROM PICC: CPT

## 2024-10-30 PROCEDURE — 7100000000 HC PACU RECOVERY - FIRST 15 MIN

## 2024-10-30 PROCEDURE — 3700000000 HC ANESTHESIA ATTENDED CARE: Performed by: ORTHOPAEDIC SURGERY

## 2024-10-30 PROCEDURE — 6360000002 HC RX W HCPCS: Performed by: ORTHOPAEDIC SURGERY

## 2024-10-30 PROCEDURE — 6370000000 HC RX 637 (ALT 250 FOR IP)

## 2024-10-30 PROCEDURE — 82550 ASSAY OF CK (CPK): CPT

## 2024-10-30 PROCEDURE — 51702 INSERT TEMP BLADDER CATH: CPT

## 2024-10-30 PROCEDURE — 82803 BLOOD GASES ANY COMBINATION: CPT

## 2024-10-30 PROCEDURE — 3700000001 HC ADD 15 MINUTES (ANESTHESIA): Performed by: ORTHOPAEDIC SURGERY

## 2024-10-30 PROCEDURE — 71045 X-RAY EXAM CHEST 1 VIEW: CPT

## 2024-10-30 PROCEDURE — 2500000003 HC RX 250 WO HCPCS

## 2024-10-30 PROCEDURE — 80053 COMPREHEN METABOLIC PANEL: CPT

## 2024-10-30 PROCEDURE — 37799 UNLISTED PX VASCULAR SURGERY: CPT

## 2024-10-30 PROCEDURE — 82248 BILIRUBIN DIRECT: CPT

## 2024-10-30 PROCEDURE — 83735 ASSAY OF MAGNESIUM: CPT

## 2024-10-30 PROCEDURE — 6370000000 HC RX 637 (ALT 250 FOR IP): Performed by: INTERNAL MEDICINE

## 2024-10-30 PROCEDURE — 2580000003 HC RX 258

## 2024-10-30 PROCEDURE — 99291 CRITICAL CARE FIRST HOUR: CPT | Performed by: SURGERY

## 2024-10-30 PROCEDURE — 2720000010 HC SURG SUPPLY STERILE: Performed by: ORTHOPAEDIC SURGERY

## 2024-10-30 PROCEDURE — 82330 ASSAY OF CALCIUM: CPT

## 2024-10-30 PROCEDURE — 3E033XZ INTRODUCTION OF VASOPRESSOR INTO PERIPHERAL VEIN, PERCUTANEOUS APPROACH: ICD-10-PCS | Performed by: STUDENT IN AN ORGANIZED HEALTH CARE EDUCATION/TRAINING PROGRAM

## 2024-10-30 PROCEDURE — C1713 ANCHOR/SCREW BN/BN,TIS/BN: HCPCS | Performed by: ORTHOPAEDIC SURGERY

## 2024-10-30 PROCEDURE — 84100 ASSAY OF PHOSPHORUS: CPT

## 2024-10-30 PROCEDURE — 94003 VENT MGMT INPAT SUBQ DAY: CPT

## 2024-10-30 PROCEDURE — 85014 HEMATOCRIT: CPT

## 2024-10-30 PROCEDURE — 7100000001 HC PACU RECOVERY - ADDTL 15 MIN

## 2024-10-30 PROCEDURE — P9016 RBC LEUKOCYTES REDUCED: HCPCS

## 2024-10-30 PROCEDURE — 80047 BASIC METABLC PNL IONIZED CA: CPT

## 2024-10-30 PROCEDURE — 2709999900 HC NON-CHARGEABLE SUPPLY: Performed by: ORTHOPAEDIC SURGERY

## 2024-10-30 PROCEDURE — 82805 BLOOD GASES W/O2 SATURATION: CPT

## 2024-10-30 PROCEDURE — 2500000003 HC RX 250 WO HCPCS: Performed by: ORTHOPAEDIC SURGERY

## 2024-10-30 PROCEDURE — 3600000015 HC SURGERY LEVEL 5 ADDTL 15MIN: Performed by: ORTHOPAEDIC SURGERY

## 2024-10-30 PROCEDURE — 0QS604Z REPOSITION RIGHT UPPER FEMUR WITH INTERNAL FIXATION DEVICE, OPEN APPROACH: ICD-10-PCS | Performed by: ORTHOPAEDIC SURGERY

## 2024-10-30 PROCEDURE — 85025 COMPLETE CBC W/AUTO DIFF WBC: CPT

## 2024-10-30 PROCEDURE — 83605 ASSAY OF LACTIC ACID: CPT

## 2024-10-30 PROCEDURE — 27228 TREAT HIP FRACTURE(S): CPT | Performed by: ORTHOPAEDIC SURGERY

## 2024-10-30 PROCEDURE — 2000000000 HC ICU R&B

## 2024-10-30 DEVICE — CURVED PELVIC PLATE; RADIUS 108
Type: IMPLANTABLE DEVICE | Site: HIP | Status: FUNCTIONAL
Brand: MATTA

## 2024-10-30 DEVICE — CORTEX SCREW S.T.: Type: IMPLANTABLE DEVICE | Site: HIP | Status: FUNCTIONAL

## 2024-10-30 RX ORDER — POTASSIUM CHLORIDE 29.8 MG/ML
INJECTION INTRAVENOUS
Status: DISCONTINUED | OUTPATIENT
Start: 2024-10-30 | End: 2024-10-30 | Stop reason: SDUPTHER

## 2024-10-30 RX ORDER — MAGNESIUM SULFATE IN WATER 40 MG/ML
2000 INJECTION, SOLUTION INTRAVENOUS ONCE
Status: COMPLETED | OUTPATIENT
Start: 2024-10-30 | End: 2024-10-30

## 2024-10-30 RX ORDER — SODIUM CHLORIDE, SODIUM LACTATE, POTASSIUM CHLORIDE, CALCIUM CHLORIDE 600; 310; 30; 20 MG/100ML; MG/100ML; MG/100ML; MG/100ML
INJECTION, SOLUTION INTRAVENOUS
Status: DISCONTINUED | OUTPATIENT
Start: 2024-10-30 | End: 2024-10-30

## 2024-10-30 RX ORDER — WATER 10 ML/10ML
2 INJECTION INTRAMUSCULAR; INTRAVENOUS; SUBCUTANEOUS PRN
Status: DISCONTINUED | OUTPATIENT
Start: 2024-10-29 | End: 2024-11-14 | Stop reason: HOSPADM

## 2024-10-30 RX ORDER — SODIUM CHLORIDE, SODIUM LACTATE, POTASSIUM CHLORIDE, CALCIUM CHLORIDE 600; 310; 30; 20 MG/100ML; MG/100ML; MG/100ML; MG/100ML
INJECTION, SOLUTION INTRAVENOUS
Status: DISCONTINUED | OUTPATIENT
Start: 2024-10-30 | End: 2024-10-30 | Stop reason: SDUPTHER

## 2024-10-30 RX ORDER — CALCIUM CHLORIDE 100 MG/ML
INJECTION INTRAVENOUS; INTRAVENTRICULAR
Status: DISCONTINUED | OUTPATIENT
Start: 2024-10-30 | End: 2024-10-30 | Stop reason: SDUPTHER

## 2024-10-30 RX ORDER — VECURONIUM BROMIDE 1 MG/ML
INJECTION, POWDER, LYOPHILIZED, FOR SOLUTION INTRAVENOUS
Status: DISCONTINUED | OUTPATIENT
Start: 2024-10-30 | End: 2024-10-30 | Stop reason: SDUPTHER

## 2024-10-30 RX ORDER — BACITRACIN ZINC 500 [USP'U]/G
OINTMENT TOPICAL DAILY
Status: DISCONTINUED | OUTPATIENT
Start: 2024-10-30 | End: 2024-11-14 | Stop reason: HOSPADM

## 2024-10-30 RX ORDER — VANCOMYCIN HYDROCHLORIDE 1 G/20ML
INJECTION, POWDER, LYOPHILIZED, FOR SOLUTION INTRAVENOUS PRN
Status: DISCONTINUED | OUTPATIENT
Start: 2024-10-30 | End: 2024-10-30 | Stop reason: ALTCHOICE

## 2024-10-30 RX ORDER — TOBRAMYCIN 1.2 G/30ML
INJECTION, POWDER, LYOPHILIZED, FOR SOLUTION INTRAVENOUS PRN
Status: DISCONTINUED | OUTPATIENT
Start: 2024-10-30 | End: 2024-10-30 | Stop reason: ALTCHOICE

## 2024-10-30 RX ORDER — ROCURONIUM BROMIDE 10 MG/ML
INJECTION, SOLUTION INTRAVENOUS
Status: DISCONTINUED | OUTPATIENT
Start: 2024-10-30 | End: 2024-10-30 | Stop reason: SDUPTHER

## 2024-10-30 RX ORDER — BACITRACIN ZINC 500 [USP'U]/G
OINTMENT TOPICAL
Status: COMPLETED
Start: 2024-10-30 | End: 2024-10-30

## 2024-10-30 RX ORDER — FIBRINOGEN HUMAN AND THROMBIN HUMAN 1 ML
KIT TOPICAL PRN
Status: DISCONTINUED | OUTPATIENT
Start: 2024-10-30 | End: 2024-10-30 | Stop reason: ALTCHOICE

## 2024-10-30 RX ORDER — PHENYLEPHRINE HCL IN 0.9% NACL 1 MG/10 ML
SYRINGE (ML) INTRAVENOUS
Status: DISCONTINUED | OUTPATIENT
Start: 2024-10-30 | End: 2024-10-30 | Stop reason: SDUPTHER

## 2024-10-30 RX ADMIN — Medication 100 MCG: at 13:48

## 2024-10-30 RX ADMIN — Medication 100 MCG: at 13:45

## 2024-10-30 RX ADMIN — INSULIN LISPRO 1 UNITS: 100 INJECTION, SOLUTION INTRAVENOUS; SUBCUTANEOUS at 16:46

## 2024-10-30 RX ADMIN — ACETAMINOPHEN 650 MG: 325 TABLET ORAL at 11:30

## 2024-10-30 RX ADMIN — HEPARIN SODIUM 7500 UNITS: 5000 INJECTION INTRAVENOUS; SUBCUTANEOUS at 22:05

## 2024-10-30 RX ADMIN — POTASSIUM BICARBONATE 40 MEQ: 782 TABLET, EFFERVESCENT ORAL at 11:31

## 2024-10-30 RX ADMIN — Medication 200 MCG: at 13:11

## 2024-10-30 RX ADMIN — HEPARIN SODIUM 7500 UNITS: 5000 INJECTION INTRAVENOUS; SUBCUTANEOUS at 05:53

## 2024-10-30 RX ADMIN — CALCIUM CHLORIDE INJECTION 1 G: 100 INJECTION, SOLUTION INTRAVENOUS at 14:54

## 2024-10-30 RX ADMIN — PROPOFOL 30 MCG/KG/MIN: 10 INJECTION, EMULSION INTRAVENOUS at 18:23

## 2024-10-30 RX ADMIN — Medication 150 MCG/HR: at 08:39

## 2024-10-30 RX ADMIN — POTASSIUM CHLORIDE 20 MEQ: 29.8 INJECTION, SOLUTION INTRAVENOUS at 14:56

## 2024-10-30 RX ADMIN — MINERAL OIL, WHITE PETROLATUM: .03; .94 OINTMENT OPHTHALMIC at 08:08

## 2024-10-30 RX ADMIN — SODIUM CHLORIDE, PRESERVATIVE FREE 10 ML: 5 INJECTION INTRAVENOUS at 08:04

## 2024-10-30 RX ADMIN — Medication 175 MCG/HR: at 22:04

## 2024-10-30 RX ADMIN — WATER 2 ML: 1 INJECTION INTRAMUSCULAR; INTRAVENOUS; SUBCUTANEOUS at 23:55

## 2024-10-30 RX ADMIN — VECURONIUM BROMIDE 7 MG: 1 INJECTION, POWDER, LYOPHILIZED, FOR SOLUTION INTRAVENOUS at 16:03

## 2024-10-30 RX ADMIN — PROPOFOL 30 MCG/KG/MIN: 10 INJECTION, EMULSION INTRAVENOUS at 06:59

## 2024-10-30 RX ADMIN — WATER 2 ML: 1 INJECTION INTRAMUSCULAR; INTRAVENOUS; SUBCUTANEOUS at 00:11

## 2024-10-30 RX ADMIN — POTASSIUM BICARBONATE 40 MEQ: 782 TABLET, EFFERVESCENT ORAL at 20:20

## 2024-10-30 RX ADMIN — Medication 200 MCG: at 13:14

## 2024-10-30 RX ADMIN — SODIUM CHLORIDE, PRESERVATIVE FREE 10 ML: 5 INJECTION INTRAVENOUS at 20:25

## 2024-10-30 RX ADMIN — HYDROCORTISONE SODIUM SUCCINATE 50 MG: 100 INJECTION, POWDER, FOR SOLUTION INTRAMUSCULAR; INTRAVENOUS at 05:27

## 2024-10-30 RX ADMIN — POTASSIUM CHLORIDE: 2 INJECTION, SOLUTION, CONCENTRATE INTRAVENOUS at 06:57

## 2024-10-30 RX ADMIN — MAGNESIUM SULFATE HEPTAHYDRATE 2000 MG: 40 INJECTION, SOLUTION INTRAVENOUS at 22:17

## 2024-10-30 RX ADMIN — Medication 175 MCG/HR: at 15:01

## 2024-10-30 RX ADMIN — POLYVINYL ALCOHOL, POVIDONE 1 DROP: 14; 6 SOLUTION/ DROPS OPHTHALMIC at 23:54

## 2024-10-30 RX ADMIN — POTASSIUM CHLORIDE: 2 INJECTION, SOLUTION, CONCENTRATE INTRAVENOUS at 21:21

## 2024-10-30 RX ADMIN — PROPOFOL 30 MCG/KG/MIN: 10 INJECTION, EMULSION INTRAVENOUS at 11:26

## 2024-10-30 RX ADMIN — BACITRACIN ZINC: 500 OINTMENT TOPICAL at 04:58

## 2024-10-30 RX ADMIN — INSULIN LISPRO 1 UNITS: 100 INJECTION, SOLUTION INTRAVENOUS; SUBCUTANEOUS at 02:17

## 2024-10-30 RX ADMIN — CALCIUM GLUCONATE 2000 MG: 98 INJECTION, SOLUTION INTRAVENOUS at 22:25

## 2024-10-30 RX ADMIN — VECURONIUM BROMIDE 5 MG: 1 INJECTION, POWDER, LYOPHILIZED, FOR SOLUTION INTRAVENOUS at 15:52

## 2024-10-30 RX ADMIN — Medication 200 MCG: at 13:08

## 2024-10-30 RX ADMIN — VECURONIUM BROMIDE 5 MG: 1 INJECTION, POWDER, LYOPHILIZED, FOR SOLUTION INTRAVENOUS at 14:40

## 2024-10-30 RX ADMIN — MINERAL OIL, WHITE PETROLATUM: .03; .94 OINTMENT OPHTHALMIC at 20:28

## 2024-10-30 RX ADMIN — ROCURONIUM BROMIDE 30 MG: 10 INJECTION, SOLUTION INTRAVENOUS at 14:06

## 2024-10-30 RX ADMIN — CHLORHEXIDINE GLUCONATE, 0.12% ORAL RINSE 15 ML: 1.2 SOLUTION DENTAL at 20:21

## 2024-10-30 RX ADMIN — CALCIUM GLUCONATE 2000 MG: 98 INJECTION, SOLUTION INTRAVENOUS at 11:46

## 2024-10-30 RX ADMIN — OXYCODONE HYDROCHLORIDE 10 MG: 5 SOLUTION ORAL at 05:27

## 2024-10-30 RX ADMIN — SODIUM CHLORIDE, POTASSIUM CHLORIDE, SODIUM LACTATE AND CALCIUM CHLORIDE: 600; 310; 30; 20 INJECTION, SOLUTION INTRAVENOUS at 12:35

## 2024-10-30 RX ADMIN — INSULIN LISPRO 1 UNITS: 100 INJECTION, SOLUTION INTRAVENOUS; SUBCUTANEOUS at 08:11

## 2024-10-30 RX ADMIN — GABAPENTIN 300 MG: 300 CAPSULE ORAL at 20:23

## 2024-10-30 RX ADMIN — POLYVINYL ALCOHOL, POVIDONE 1 DROP: 14; 6 SOLUTION/ DROPS OPHTHALMIC at 02:17

## 2024-10-30 RX ADMIN — BACITRACIN ZINC: 500 OINTMENT TOPICAL at 08:12

## 2024-10-30 RX ADMIN — POLYVINYL ALCOHOL, POVIDONE 1 DROP: 14; 6 SOLUTION/ DROPS OPHTHALMIC at 11:32

## 2024-10-30 RX ADMIN — Medication 150 MCG/HR: at 01:00

## 2024-10-30 RX ADMIN — HYDROCORTISONE SODIUM SUCCINATE 50 MG: 100 INJECTION, POWDER, FOR SOLUTION INTRAMUSCULAR; INTRAVENOUS at 11:31

## 2024-10-30 RX ADMIN — WATER 2 ML: 1 INJECTION INTRAMUSCULAR; INTRAVENOUS; SUBCUTANEOUS at 20:21

## 2024-10-30 RX ADMIN — POLYETHYLENE GLYCOL 3350 17 G: 17 POWDER, FOR SOLUTION ORAL at 08:12

## 2024-10-30 RX ADMIN — PROPOFOL 30 MCG/KG/MIN: 10 INJECTION, EMULSION INTRAVENOUS at 16:51

## 2024-10-30 RX ADMIN — SODIUM CHLORIDE, PRESERVATIVE FREE 40 MG: 5 INJECTION INTRAVENOUS at 08:12

## 2024-10-30 RX ADMIN — ROCURONIUM BROMIDE 50 MG: 10 INJECTION, SOLUTION INTRAVENOUS at 12:49

## 2024-10-30 RX ADMIN — HYDROCORTISONE SODIUM SUCCINATE 50 MG: 100 INJECTION, POWDER, FOR SOLUTION INTRAMUSCULAR; INTRAVENOUS at 23:56

## 2024-10-30 RX ADMIN — VECURONIUM BROMIDE 3 MG: 1 INJECTION, POWDER, LYOPHILIZED, FOR SOLUTION INTRAVENOUS at 15:23

## 2024-10-30 RX ADMIN — ACETAMINOPHEN 650 MG: 325 TABLET ORAL at 20:21

## 2024-10-30 RX ADMIN — GABAPENTIN 300 MG: 300 CAPSULE ORAL at 08:12

## 2024-10-30 RX ADMIN — SODIUM CHLORIDE, PRESERVATIVE FREE 40 MG: 5 INJECTION INTRAVENOUS at 20:20

## 2024-10-30 RX ADMIN — INSULIN LISPRO 1 UNITS: 100 INJECTION, SOLUTION INTRAVENOUS; SUBCUTANEOUS at 11:36

## 2024-10-30 RX ADMIN — PROPOFOL 75 MCG/KG/MIN: 10 INJECTION, EMULSION INTRAVENOUS at 14:24

## 2024-10-30 RX ADMIN — POLYVINYL ALCOHOL, POVIDONE 1 DROP: 14; 6 SOLUTION/ DROPS OPHTHALMIC at 05:09

## 2024-10-30 RX ADMIN — ROCURONIUM BROMIDE 20 MG: 10 INJECTION, SOLUTION INTRAVENOUS at 14:16

## 2024-10-30 RX ADMIN — WATER 2 ML: 1 INJECTION INTRAMUSCULAR; INTRAVENOUS; SUBCUTANEOUS at 05:27

## 2024-10-30 RX ADMIN — OXYCODONE HYDROCHLORIDE 10 MG: 5 SOLUTION ORAL at 20:19

## 2024-10-30 RX ADMIN — HYDROCORTISONE SODIUM SUCCINATE 50 MG: 100 INJECTION, POWDER, FOR SOLUTION INTRAMUSCULAR; INTRAVENOUS at 20:20

## 2024-10-30 RX ADMIN — CEFEPIME 2000 MG: 2 INJECTION, POWDER, FOR SOLUTION INTRAVENOUS at 09:57

## 2024-10-30 RX ADMIN — INSULIN LISPRO 1 UNITS: 100 INJECTION, SOLUTION INTRAVENOUS; SUBCUTANEOUS at 05:43

## 2024-10-30 RX ADMIN — PROPOFOL 30 MCG/KG/MIN: 10 INJECTION, EMULSION INTRAVENOUS at 02:43

## 2024-10-30 RX ADMIN — ROCURONIUM BROMIDE 50 MG: 10 INJECTION, SOLUTION INTRAVENOUS at 13:26

## 2024-10-30 RX ADMIN — OXYCODONE HYDROCHLORIDE 10 MG: 5 SOLUTION ORAL at 11:30

## 2024-10-30 RX ADMIN — ACETAMINOPHEN 650 MG: 325 TABLET ORAL at 23:56

## 2024-10-30 RX ADMIN — PROPOFOL 25 MCG/KG/MIN: 10 INJECTION, EMULSION INTRAVENOUS at 22:57

## 2024-10-30 RX ADMIN — ACETAMINOPHEN 650 MG: 325 TABLET ORAL at 05:27

## 2024-10-30 RX ADMIN — CHLORHEXIDINE GLUCONATE, 0.12% ORAL RINSE 15 ML: 1.2 SOLUTION DENTAL at 08:03

## 2024-10-30 RX ADMIN — CEFEPIME 2000 MG: 2 INJECTION, POWDER, FOR SOLUTION INTRAVENOUS at 18:31

## 2024-10-30 ASSESSMENT — PULMONARY FUNCTION TESTS
PIF_VALUE: 35
PIF_VALUE: 35
PIF_VALUE: 25
PIF_VALUE: 27
PIF_VALUE: 22
PIF_VALUE: 25
PIF_VALUE: 25
PIF_VALUE: 31
PIF_VALUE: 26
PIF_VALUE: 25
PIF_VALUE: 44
PIF_VALUE: 25
PIF_VALUE: 25
PIF_VALUE: 44
PIF_VALUE: 21
PIF_VALUE: 25
PIF_VALUE: 30
PIF_VALUE: 26
PIF_VALUE: 29
PIF_VALUE: 25
PIF_VALUE: 24
PIF_VALUE: 25
PIF_VALUE: 27

## 2024-10-30 ASSESSMENT — PAIN SCALES - GENERAL
PAINLEVEL_OUTOF10: 0
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
## 2024-10-30 NOTE — ANESTHESIA PRE PROCEDURE
hip    Positive for cocaine and fentanyl on admission 10/24/24 Abdominal:   (+) obese          Vascular:           ROS comment: Pelvic Bleed. Other Findings:         Anesthesia Plan      general     ASA 4       Induction: intravenous.      Anesthetic plan and risks discussed with child/children (Patients justen Bravo gave consent by phone 10/30/24).    Use of blood products discussed with whom consented to blood products.  Blood Products Consent Comment: Patients justen Bravo gave consent by phone 10/30/24  Plan discussed with CRNA.                Justo Guadalupe MD   10/30/2024

## 2024-10-30 NOTE — CARE COORDINATION
10/30 Care Coordination: Pt remains in SICU, Cont on vent. on Bumex drip, sedated Bilateral chest tubes.  Extremities-left lower extremity in splint immobilizer  Right lower extremity in traction pin Splint to the right upper extremity. With Current status unclear on discharge needs.   CM/SW will continue to follow for discharge planning.   Toan PATEL,RN--BC  [phone]

## 2024-10-30 NOTE — PROGRESS NOTES
Presence and prayer.  Patient appeared to be intubated.    If you need additional support, you may reach out to us at [facility], x [phone].     Brandyn Red; DAR Dunbar
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
## 2024-10-30 NOTE — ANESTHESIA PROCEDURE NOTES
Arterial Line:    An arterial line was placed using ultrasound guidance, in the OR for the following indication(s): continuous blood pressure monitoring and blood sampling needed.    A 20 gauge (size), 15 cm (length), Arrow (type) catheter was placed, Seldinger technique not used, into the left brachial artery, secured by Tegaderm.  Anesthesia type: General    Events:  patient tolerated procedure well with no complications.10/30/2024 1:00 PM10/30/2024 1:13 PM  Anesthesiologist: Justo Guadalupe MD  Performed: Anesthesiologist   Preanesthetic Checklist  Completed: patient identified, IV checked, site marked, risks and benefits discussed, surgical/procedural consents, equipment checked, pre-op evaluation, timeout performed, anesthesia consent given, oxygen available and monitors applied/VS acknowledged
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
## 2024-10-30 NOTE — FLOWSHEET NOTE
While patient's arms are free he attempts to pull at ETT and other lines.  Verbal redirection unsuccessful at this time.  Restraints continued to promote patient's safety and will continue to monitor.

## 2024-10-30 NOTE — PLAN OF CARE
Problem: Pain  Goal: Verbalizes/displays adequate comfort level or baseline comfort level  10/30/2024 0949 by Bibiana Adhikari RN  Outcome: Progressing  10/29/2024 2148 by Jada Ceja RN  Outcome: Progressing     Problem: Safety - Adult  Goal: Free from fall injury  10/30/2024 0949 by Bibiana Adhikari RN  Outcome: Progressing  10/29/2024 2148 by Jada Ceja RN  Outcome: Progressing     Problem: Skin/Tissue Integrity  Goal: Absence of new skin breakdown  Description: 1.  Monitor for areas of redness and/or skin breakdown  2.  Assess vascular access sites hourly  3.  Every 4-6 hours minimum:  Change oxygen saturation probe site  4.  Every 4-6 hours:  If on nasal continuous positive airway pressure, respiratory therapy assess nares and determine need for appliance change or resting period.  10/30/2024 0949 by Bibiana Adhikari RN  Outcome: Progressing  10/29/2024 2148 by Jada Ceja RN  Outcome: Progressing     Problem: ABCDS Injury Assessment  Goal: Absence of physical injury  Outcome: Progressing  Flowsheets (Taken 10/30/2024 0948)  Absence of Physical Injury: Implement safety measures based on patient assessment     Problem: Safety - Medical Restraint  Goal: Remains free of injury from restraints (Restraint for Interference with Medical Device)  Description: INTERVENTIONS:  1. Determine that other, less restrictive measures have been tried or would not be effective before applying the restraint  2. Evaluate the patient's condition at the time of restraint application  3. Inform patient/family regarding the reason for restraint  4. Q2H: Monitor safety, psychosocial status, comfort, nutrition and hydration  10/30/2024 0949 by Bibiana Adhikari RN  Outcome: Progressing  Flowsheets  Taken 10/30/2024 0800 by Bibiana Adhikari RN  Remains free of injury from restraints (restraint for interference with medical device): Every 2 hours: Monitor safety, psychosocial status, comfort, nutrition and hydration  Taken 10/30/2024  vomiting: Maintain NPO status until nausea and vomiting are resolved  Goal: Maintains or returns to baseline bowel function  Outcome: Not Progressing  Flowsheets (Taken 10/30/2024 0800)  Maintains or returns to baseline bowel function: Assess bowel function     Problem: Genitourinary - Adult  Goal: Absence of urinary retention  Outcome: Not Progressing  Goal: Urinary catheter remains patent  Outcome: Progressing  Flowsheets (Taken 10/30/2024 0800)  Urinary catheter remains patent: Assess patency of urinary catheter     Problem: Metabolic/Fluid and Electrolytes - Adult  Goal: Electrolytes maintained within normal limits  Outcome: Progressing  Flowsheets (Taken 10/30/2024 0800)  Electrolytes maintained within normal limits:   Monitor labs and assess patient for signs and symptoms of electrolyte imbalances   Administer electrolyte replacement as ordered  Goal: Hemodynamic stability and optimal renal function maintained  Outcome: Progressing  Flowsheets (Taken 10/30/2024 0800)  Hemodynamic stability and optimal renal function maintained: Monitor labs and assess for signs and symptoms of volume excess or deficit  Goal: Glucose maintained within prescribed range  Outcome: Progressing  Flowsheets (Taken 10/30/2024 0800)  Glucose maintained within prescribed range: Monitor blood glucose as ordered     Problem: Hematologic - Adult  Goal: Maintains hematologic stability  Outcome: Progressing  Flowsheets (Taken 10/30/2024 0800)  Maintains hematologic stability: Assess for signs and symptoms of bleeding or hemorrhage     Problem: Anxiety  Goal: Will report anxiety at manageable levels  Description: INTERVENTIONS:  1. Administer medication as ordered  2. Teach and rehearse alternative coping skills  3. Provide emotional support with 1:1 interaction with staff  Outcome: Progressing  Flowsheets (Taken 10/30/2024 0800)  Will report anxiety at manageable levels: Provide emotional support with 1:1 interaction with staff     Problem:

## 2024-10-31 ENCOUNTER — APPOINTMENT (OUTPATIENT)
Dept: CT IMAGING | Age: 49
End: 2024-10-31
Payer: MEDICARE

## 2024-10-31 ENCOUNTER — APPOINTMENT (OUTPATIENT)
Dept: GENERAL RADIOLOGY | Age: 49
End: 2024-10-31
Payer: MEDICARE

## 2024-10-31 LAB
AADO2: 436.3 MMHG
ALBUMIN SERPL-MCNC: 2.2 G/DL (ref 3.5–5.2)
ALBUMIN SERPL-MCNC: 2.4 G/DL (ref 3.5–5.2)
ALBUMIN SERPL-MCNC: 2.4 G/DL (ref 3.5–5.2)
ALBUMIN SERPL-MCNC: 2.5 G/DL (ref 3.5–5.2)
ALP SERPL-CCNC: 151 U/L (ref 40–129)
ALP SERPL-CCNC: 171 U/L (ref 40–129)
ALP SERPL-CCNC: 181 U/L (ref 40–129)
ALP SERPL-CCNC: 183 U/L (ref 40–129)
ALT SERPL-CCNC: 190 U/L (ref 0–40)
ALT SERPL-CCNC: 212 U/L (ref 0–40)
ALT SERPL-CCNC: 232 U/L (ref 0–40)
ALT SERPL-CCNC: 258 U/L (ref 0–40)
ANION GAP SERPL CALCULATED.3IONS-SCNC: 10 MMOL/L (ref 7–16)
ANION GAP SERPL CALCULATED.3IONS-SCNC: 5 MMOL/L (ref 7–16)
ANION GAP SERPL CALCULATED.3IONS-SCNC: 6 MMOL/L (ref 7–16)
ANION GAP SERPL CALCULATED.3IONS-SCNC: 6 MMOL/L (ref 7–16)
AST SERPL-CCNC: 138 U/L (ref 0–39)
AST SERPL-CCNC: 144 U/L (ref 0–39)
AST SERPL-CCNC: 159 U/L (ref 0–39)
AST SERPL-CCNC: 162 U/L (ref 0–39)
B.E.: 11.4 MMOL/L (ref -3–3)
BASOPHILS # BLD: 0 K/UL (ref 0–0.2)
BASOPHILS # BLD: 0 K/UL (ref 0–0.2)
BASOPHILS # BLD: 0.56 K/UL (ref 0–0.2)
BASOPHILS NFR BLD: 0 % (ref 0–2)
BASOPHILS NFR BLD: 0 % (ref 0–2)
BASOPHILS NFR BLD: 3 % (ref 0–2)
BILIRUB SERPL-MCNC: 1.4 MG/DL (ref 0–1.2)
BILIRUB SERPL-MCNC: 1.6 MG/DL (ref 0–1.2)
BILIRUB SERPL-MCNC: 1.6 MG/DL (ref 0–1.2)
BILIRUB SERPL-MCNC: 1.7 MG/DL (ref 0–1.2)
BUN SERPL-MCNC: 51 MG/DL (ref 6–20)
BUN SERPL-MCNC: 51 MG/DL (ref 6–20)
BUN SERPL-MCNC: 52 MG/DL (ref 6–20)
BUN SERPL-MCNC: 53 MG/DL (ref 6–20)
CA-I BLD-SCNC: 1.06 MMOL/L (ref 1.15–1.33)
CA-I BLD-SCNC: 1.08 MMOL/L (ref 1.15–1.33)
CA-I BLD-SCNC: 1.08 MMOL/L (ref 1.15–1.33)
CA-I BLD-SCNC: 1.1 MMOL/L (ref 1.15–1.33)
CALCIUM SERPL-MCNC: 7.7 MG/DL (ref 8.6–10.2)
CALCIUM SERPL-MCNC: 7.7 MG/DL (ref 8.6–10.2)
CALCIUM SERPL-MCNC: 7.8 MG/DL (ref 8.6–10.2)
CALCIUM SERPL-MCNC: 8.2 MG/DL (ref 8.6–10.2)
CHLORIDE SERPL-SCNC: 107 MMOL/L (ref 98–107)
CHLORIDE SERPL-SCNC: 109 MMOL/L (ref 98–107)
CK SERPL-CCNC: 2359 U/L (ref 20–200)
CO2 SERPL-SCNC: 35 MMOL/L (ref 22–29)
CO2 SERPL-SCNC: 35 MMOL/L (ref 22–29)
CO2 SERPL-SCNC: 36 MMOL/L (ref 22–29)
CO2 SERPL-SCNC: 36 MMOL/L (ref 22–29)
COHB: 0.3 % (ref 0–1.5)
CREAT SERPL-MCNC: 1 MG/DL (ref 0.7–1.2)
CREAT SERPL-MCNC: 1 MG/DL (ref 0.7–1.2)
CREAT SERPL-MCNC: 1.1 MG/DL (ref 0.7–1.2)
CREAT SERPL-MCNC: 1.2 MG/DL (ref 0.7–1.2)
CRITICAL: ABNORMAL
DATE ANALYZED: ABNORMAL
DATE OF COLLECTION: ABNORMAL
EOSINOPHIL # BLD: 0 K/UL (ref 0.05–0.5)
EOSINOPHIL # BLD: 0 K/UL (ref 0.05–0.5)
EOSINOPHIL # BLD: 0.37 K/UL (ref 0.05–0.5)
EOSINOPHILS RELATIVE PERCENT: 0 % (ref 0–6)
EOSINOPHILS RELATIVE PERCENT: 0 % (ref 0–6)
EOSINOPHILS RELATIVE PERCENT: 2 % (ref 0–6)
ERYTHROCYTE [DISTWIDTH] IN BLOOD BY AUTOMATED COUNT: 17.2 % (ref 11.5–15)
ERYTHROCYTE [DISTWIDTH] IN BLOOD BY AUTOMATED COUNT: 17.3 % (ref 11.5–15)
ERYTHROCYTE [DISTWIDTH] IN BLOOD BY AUTOMATED COUNT: 17.5 % (ref 11.5–15)
FIO2: 80 %
GFR, ESTIMATED: 77 ML/MIN/1.73M2
GFR, ESTIMATED: 82 ML/MIN/1.73M2
GFR, ESTIMATED: 87 ML/MIN/1.73M2
GFR, ESTIMATED: >90 ML/MIN/1.73M2
GLUCOSE BLD-MCNC: 166 MG/DL (ref 74–99)
GLUCOSE BLD-MCNC: 173 MG/DL (ref 74–99)
GLUCOSE BLD-MCNC: 233 MG/DL (ref 74–99)
GLUCOSE BLD-MCNC: 239 MG/DL (ref 74–99)
GLUCOSE BLD-MCNC: 241 MG/DL (ref 74–99)
GLUCOSE BLD-MCNC: 246 MG/DL (ref 74–99)
GLUCOSE BLD-MCNC: 252 MG/DL (ref 74–99)
GLUCOSE SERPL-MCNC: 239 MG/DL (ref 74–99)
GLUCOSE SERPL-MCNC: 239 MG/DL (ref 74–99)
GLUCOSE SERPL-MCNC: 246 MG/DL (ref 74–99)
GLUCOSE SERPL-MCNC: 246 MG/DL (ref 74–99)
HCO3: 36.5 MMOL/L (ref 22–26)
HCT VFR BLD AUTO: 24.8 % (ref 37–54)
HCT VFR BLD AUTO: 25.2 % (ref 37–54)
HCT VFR BLD AUTO: 25.8 % (ref 37–54)
HGB BLD-MCNC: 7.7 G/DL (ref 12.5–16.5)
HGB BLD-MCNC: 7.8 G/DL (ref 12.5–16.5)
HGB BLD-MCNC: 8 G/DL (ref 12.5–16.5)
HHB: 4.2 % (ref 0–5)
LAB: ABNORMAL
LYMPHOCYTES NFR BLD: 0.7 K/UL (ref 1.5–4)
LYMPHOCYTES NFR BLD: 0.73 K/UL (ref 1.5–4)
LYMPHOCYTES NFR BLD: 2.41 K/UL (ref 1.5–4)
LYMPHOCYTES RELATIVE PERCENT: 11 % (ref 20–42)
LYMPHOCYTES RELATIVE PERCENT: 4 % (ref 20–42)
LYMPHOCYTES RELATIVE PERCENT: 4 % (ref 20–42)
Lab: 440
MAGNESIUM SERPL-MCNC: 2.1 MG/DL (ref 1.6–2.6)
MAGNESIUM SERPL-MCNC: 2.4 MG/DL (ref 1.6–2.6)
MCH RBC QN AUTO: 28.4 PG (ref 26–35)
MCH RBC QN AUTO: 28.5 PG (ref 26–35)
MCH RBC QN AUTO: 28.6 PG (ref 26–35)
MCHC RBC AUTO-ENTMCNC: 31 G/DL (ref 32–34.5)
MCV RBC AUTO: 91.5 FL (ref 80–99.9)
MCV RBC AUTO: 92 FL (ref 80–99.9)
MCV RBC AUTO: 92.2 FL (ref 80–99.9)
METAMYELOCYTES ABSOLUTE COUNT: 0.35 K/UL (ref 0–0.12)
METAMYELOCYTES ABSOLUTE COUNT: 0.36 K/UL (ref 0–0.12)
METAMYELOCYTES ABSOLUTE COUNT: 0.37 K/UL (ref 0–0.12)
METAMYELOCYTES: 2 % (ref 0–1)
METHB: 5.1 % (ref 0–1.5)
MICROORGANISM SPEC CULT: ABNORMAL
MICROORGANISM SPEC CULT: ABNORMAL
MICROORGANISM/AGENT SPEC: ABNORMAL
MODE: AC
MONOCYTES NFR BLD: 1.11 K/UL (ref 0.1–0.95)
MONOCYTES NFR BLD: 1.22 K/UL (ref 0.1–0.95)
MONOCYTES NFR BLD: 12 % (ref 2–12)
MONOCYTES NFR BLD: 2.54 K/UL (ref 0.1–0.95)
MONOCYTES NFR BLD: 5 % (ref 2–12)
MONOCYTES NFR BLD: 6 % (ref 2–12)
MYELOCYTES ABSOLUTE COUNT: 0.17 K/UL
MYELOCYTES ABSOLUTE COUNT: 0.18 K/UL
MYELOCYTES ABSOLUTE COUNT: 0.93 K/UL
MYELOCYTES: 1 %
MYELOCYTES: 1 %
MYELOCYTES: 4 %
NEUTROPHILS NFR BLD: 73 % (ref 43–80)
NEUTROPHILS NFR BLD: 81 % (ref 43–80)
NEUTROPHILS NFR BLD: 87 % (ref 43–80)
NEUTS SEG NFR BLD: 15.56 K/UL (ref 1.8–7.3)
NEUTS SEG NFR BLD: 16.69 K/UL (ref 1.8–7.3)
NEUTS SEG NFR BLD: 17.08 K/UL (ref 1.8–7.3)
NUCLEATED RED BLOOD CELLS: 14 PER 100 WBC
NUCLEATED RED BLOOD CELLS: 16 PER 100 WBC
NUCLEATED RED BLOOD CELLS: 4 PER 100 WBC
O2 SATURATION: 95.6 % (ref 92–98.5)
O2HB: 90.4 % (ref 94–97)
OPERATOR ID: 2067
PATIENT TEMP: 37 C
PCO2: 50.8 MMHG (ref 35–45)
PEEP/CPAP: 10 CMH2O
PFO2: 1.01 MMHG/%
PH BLOOD GAS: 7.47 (ref 7.35–7.45)
PHOSPHATE SERPL-MCNC: 1.8 MG/DL (ref 2.5–4.5)
PHOSPHATE SERPL-MCNC: 1.8 MG/DL (ref 2.5–4.5)
PHOSPHATE SERPL-MCNC: 2.3 MG/DL (ref 2.5–4.5)
PHOSPHATE SERPL-MCNC: 2.5 MG/DL (ref 2.5–4.5)
PLATELET # BLD AUTO: 253 K/UL (ref 130–450)
PLATELET # BLD AUTO: 268 K/UL (ref 130–450)
PLATELET # BLD AUTO: 301 K/UL (ref 130–450)
PMV BLD AUTO: 11.1 FL (ref 7–12)
PMV BLD AUTO: 11.1 FL (ref 7–12)
PMV BLD AUTO: 11.4 FL (ref 7–12)
PO2: 80.8 MMHG (ref 75–100)
POTASSIUM SERPL-SCNC: 3.5 MMOL/L (ref 3.5–5)
POTASSIUM SERPL-SCNC: 3.6 MMOL/L (ref 3.5–5)
POTASSIUM SERPL-SCNC: 3.7 MMOL/L (ref 3.5–5)
POTASSIUM SERPL-SCNC: 4.2 MMOL/L (ref 3.5–5)
PROMYELOCYTES ABSOLUTE COUNT: 0.17 K/UL
PROMYELOCYTES: 1 %
PROT SERPL-MCNC: 5 G/DL (ref 6.4–8.3)
PROT SERPL-MCNC: 5.1 G/DL (ref 6.4–8.3)
RBC # BLD AUTO: 2.69 M/UL (ref 3.8–5.8)
RBC # BLD AUTO: 2.74 M/UL (ref 3.8–5.8)
RBC # BLD AUTO: 2.82 M/UL (ref 3.8–5.8)
RBC # BLD: ABNORMAL 10*6/UL
RI(T): 5.4
RR MECHANICAL: 16 B/MIN
SODIUM SERPL-SCNC: 148 MMOL/L (ref 132–146)
SODIUM SERPL-SCNC: 149 MMOL/L (ref 132–146)
SODIUM SERPL-SCNC: 150 MMOL/L (ref 132–146)
SODIUM SERPL-SCNC: 152 MMOL/L (ref 132–146)
SOURCE, BLOOD GAS: ABNORMAL
SPECIMEN DESCRIPTION: ABNORMAL
THB: 10.1 G/DL (ref 11.5–16.5)
TIME ANALYZED: 503
VT MECHANICAL: 500 ML
WBC OTHER # BLD: 19.7 K/UL (ref 4.5–11.5)
WBC OTHER # BLD: 20.5 K/UL (ref 4.5–11.5)
WBC OTHER # BLD: 21.3 K/UL (ref 4.5–11.5)

## 2024-10-31 PROCEDURE — 36415 COLL VENOUS BLD VENIPUNCTURE: CPT

## 2024-10-31 PROCEDURE — 2580000003 HC RX 258: Performed by: NURSE PRACTITIONER

## 2024-10-31 PROCEDURE — 82805 BLOOD GASES W/O2 SATURATION: CPT

## 2024-10-31 PROCEDURE — 2580000003 HC RX 258

## 2024-10-31 PROCEDURE — 99291 CRITICAL CARE FIRST HOUR: CPT | Performed by: SURGERY

## 2024-10-31 PROCEDURE — 2500000003 HC RX 250 WO HCPCS: Performed by: NURSE PRACTITIONER

## 2024-10-31 PROCEDURE — 76937 US GUIDE VASCULAR ACCESS: CPT

## 2024-10-31 PROCEDURE — 6370000000 HC RX 637 (ALT 250 FOR IP)

## 2024-10-31 PROCEDURE — 6360000002 HC RX W HCPCS: Performed by: NURSE PRACTITIONER

## 2024-10-31 PROCEDURE — 82550 ASSAY OF CK (CPK): CPT

## 2024-10-31 PROCEDURE — 72190 X-RAY EXAM OF PELVIS: CPT

## 2024-10-31 PROCEDURE — 82962 GLUCOSE BLOOD TEST: CPT

## 2024-10-31 PROCEDURE — 6370000000 HC RX 637 (ALT 250 FOR IP): Performed by: INTERNAL MEDICINE

## 2024-10-31 PROCEDURE — 73700 CT LOWER EXTREMITY W/O DYE: CPT

## 2024-10-31 PROCEDURE — 2500000003 HC RX 250 WO HCPCS

## 2024-10-31 PROCEDURE — 36569 INSJ PICC 5 YR+ W/O IMAGING: CPT

## 2024-10-31 PROCEDURE — 80053 COMPREHEN METABOLIC PANEL: CPT

## 2024-10-31 PROCEDURE — 94003 VENT MGMT INPAT SUBQ DAY: CPT

## 2024-10-31 PROCEDURE — 37799 UNLISTED PX VASCULAR SURGERY: CPT

## 2024-10-31 PROCEDURE — C1751 CATH, INF, PER/CENT/MIDLINE: HCPCS

## 2024-10-31 PROCEDURE — 85025 COMPLETE CBC W/AUTO DIFF WBC: CPT

## 2024-10-31 PROCEDURE — 2580000003 HC RX 258: Performed by: SURGERY

## 2024-10-31 PROCEDURE — 71045 X-RAY EXAM CHEST 1 VIEW: CPT

## 2024-10-31 PROCEDURE — 6360000002 HC RX W HCPCS

## 2024-10-31 PROCEDURE — 84100 ASSAY OF PHOSPHORUS: CPT

## 2024-10-31 PROCEDURE — 2000000000 HC ICU R&B

## 2024-10-31 PROCEDURE — 02HV33Z INSERTION OF INFUSION DEVICE INTO SUPERIOR VENA CAVA, PERCUTANEOUS APPROACH: ICD-10-PCS | Performed by: STUDENT IN AN ORGANIZED HEALTH CARE EDUCATION/TRAINING PROGRAM

## 2024-10-31 PROCEDURE — 83735 ASSAY OF MAGNESIUM: CPT

## 2024-10-31 PROCEDURE — 82330 ASSAY OF CALCIUM: CPT

## 2024-10-31 RX ORDER — SODIUM CHLORIDE 9 MG/ML
INJECTION, SOLUTION INTRAVENOUS PRN
Status: DISCONTINUED | OUTPATIENT
Start: 2024-10-31 | End: 2024-11-14 | Stop reason: HOSPADM

## 2024-10-31 RX ORDER — INSULIN LISPRO 100 [IU]/ML
0-8 INJECTION, SOLUTION INTRAVENOUS; SUBCUTANEOUS EVERY 4 HOURS
Status: DISCONTINUED | OUTPATIENT
Start: 2024-10-31 | End: 2024-11-03

## 2024-10-31 RX ORDER — LIDOCAINE HYDROCHLORIDE 10 MG/ML
50 INJECTION, SOLUTION EPIDURAL; INFILTRATION; INTRACAUDAL; PERINEURAL ONCE
Status: DISCONTINUED | OUTPATIENT
Start: 2024-10-31 | End: 2024-11-03

## 2024-10-31 RX ORDER — SODIUM CHLORIDE 0.9 % (FLUSH) 0.9 %
5-40 SYRINGE (ML) INJECTION EVERY 12 HOURS SCHEDULED
Status: DISCONTINUED | OUTPATIENT
Start: 2024-10-31 | End: 2024-11-06

## 2024-10-31 RX ORDER — SODIUM CHLORIDE 0.9 % (FLUSH) 0.9 %
5-40 SYRINGE (ML) INJECTION PRN
Status: DISCONTINUED | OUTPATIENT
Start: 2024-10-31 | End: 2024-11-06

## 2024-10-31 RX ADMIN — OXYCODONE HYDROCHLORIDE 10 MG: 5 SOLUTION ORAL at 18:31

## 2024-10-31 RX ADMIN — INSULIN LISPRO 2 UNITS: 100 INJECTION, SOLUTION INTRAVENOUS; SUBCUTANEOUS at 15:38

## 2024-10-31 RX ADMIN — POTASSIUM BICARBONATE 40 MEQ: 782 TABLET, EFFERVESCENT ORAL at 20:00

## 2024-10-31 RX ADMIN — INSULIN LISPRO 1 UNITS: 100 INJECTION, SOLUTION INTRAVENOUS; SUBCUTANEOUS at 04:44

## 2024-10-31 RX ADMIN — Medication 175 MCG/HR: at 21:27

## 2024-10-31 RX ADMIN — POTASSIUM CHLORIDE: 2 INJECTION, SOLUTION, CONCENTRATE INTRAVENOUS at 11:28

## 2024-10-31 RX ADMIN — HYDROCORTISONE SODIUM SUCCINATE 50 MG: 100 INJECTION, POWDER, FOR SOLUTION INTRAMUSCULAR; INTRAVENOUS at 04:46

## 2024-10-31 RX ADMIN — POLYETHYLENE GLYCOL 3350 17 G: 17 POWDER, FOR SOLUTION ORAL at 09:26

## 2024-10-31 RX ADMIN — PROPOFOL 30 MCG/KG/MIN: 10 INJECTION, EMULSION INTRAVENOUS at 23:27

## 2024-10-31 RX ADMIN — SODIUM CHLORIDE, PRESERVATIVE FREE 10 ML: 5 INJECTION INTRAVENOUS at 20:00

## 2024-10-31 RX ADMIN — SODIUM CHLORIDE, PRESERVATIVE FREE 10 ML: 5 INJECTION INTRAVENOUS at 21:27

## 2024-10-31 RX ADMIN — PROPOFOL 30 MCG/KG/MIN: 10 INJECTION, EMULSION INTRAVENOUS at 15:23

## 2024-10-31 RX ADMIN — Medication 175 MCG/HR: at 02:27

## 2024-10-31 RX ADMIN — GABAPENTIN 300 MG: 300 CAPSULE ORAL at 19:59

## 2024-10-31 RX ADMIN — OXYCODONE HYDROCHLORIDE 10 MG: 5 SOLUTION ORAL at 02:12

## 2024-10-31 RX ADMIN — CEFEPIME 2000 MG: 2 INJECTION, POWDER, FOR SOLUTION INTRAVENOUS at 02:15

## 2024-10-31 RX ADMIN — GABAPENTIN 300 MG: 300 CAPSULE ORAL at 09:27

## 2024-10-31 RX ADMIN — PROPOFOL 30 MCG/KG/MIN: 10 INJECTION, EMULSION INTRAVENOUS at 06:26

## 2024-10-31 RX ADMIN — OXYCODONE HYDROCHLORIDE 10 MG: 5 SOLUTION ORAL at 05:49

## 2024-10-31 RX ADMIN — HEPARIN SODIUM 7500 UNITS: 5000 INJECTION INTRAVENOUS; SUBCUTANEOUS at 21:27

## 2024-10-31 RX ADMIN — WATER 2 ML: 1 INJECTION INTRAMUSCULAR; INTRAVENOUS; SUBCUTANEOUS at 04:45

## 2024-10-31 RX ADMIN — PROPOFOL 30 MCG/KG/MIN: 10 INJECTION, EMULSION INTRAVENOUS at 02:27

## 2024-10-31 RX ADMIN — CEFEPIME 2000 MG: 2 INJECTION, POWDER, FOR SOLUTION INTRAVENOUS at 09:47

## 2024-10-31 RX ADMIN — INSULIN LISPRO 2 UNITS: 100 INJECTION, SOLUTION INTRAVENOUS; SUBCUTANEOUS at 19:58

## 2024-10-31 RX ADMIN — CALCIUM GLUCONATE 2000 MG: 98 INJECTION, SOLUTION INTRAVENOUS at 13:07

## 2024-10-31 RX ADMIN — MINERAL OIL, WHITE PETROLATUM: .03; .94 OINTMENT OPHTHALMIC at 19:59

## 2024-10-31 RX ADMIN — ACETAMINOPHEN 650 MG: 325 TABLET ORAL at 04:44

## 2024-10-31 RX ADMIN — MINERAL OIL, WHITE PETROLATUM: .03; .94 OINTMENT OPHTHALMIC at 15:39

## 2024-10-31 RX ADMIN — Medication 175 MCG/HR: at 15:22

## 2024-10-31 RX ADMIN — HYDROCORTISONE SODIUM SUCCINATE 50 MG: 100 INJECTION, POWDER, FOR SOLUTION INTRAMUSCULAR; INTRAVENOUS at 18:31

## 2024-10-31 RX ADMIN — HEPARIN SODIUM 7500 UNITS: 5000 INJECTION INTRAVENOUS; SUBCUTANEOUS at 15:05

## 2024-10-31 RX ADMIN — INSULIN LISPRO 1 UNITS: 100 INJECTION, SOLUTION INTRAVENOUS; SUBCUTANEOUS at 13:11

## 2024-10-31 RX ADMIN — SODIUM CHLORIDE, PRESERVATIVE FREE 40 MG: 5 INJECTION INTRAVENOUS at 20:00

## 2024-10-31 RX ADMIN — POTASSIUM BICARBONATE 40 MEQ: 782 TABLET, EFFERVESCENT ORAL at 09:27

## 2024-10-31 RX ADMIN — CEFEPIME 2000 MG: 2 INJECTION, POWDER, FOR SOLUTION INTRAVENOUS at 18:43

## 2024-10-31 RX ADMIN — POLYVINYL ALCOHOL, POVIDONE 1 DROP: 14; 6 SOLUTION/ DROPS OPHTHALMIC at 04:45

## 2024-10-31 RX ADMIN — PROPOFOL 30 MCG/KG/MIN: 10 INJECTION, EMULSION INTRAVENOUS at 11:08

## 2024-10-31 RX ADMIN — Medication 175 MCG/HR: at 08:58

## 2024-10-31 RX ADMIN — PROPOFOL 30 MCG/KG/MIN: 10 INJECTION, EMULSION INTRAVENOUS at 19:37

## 2024-10-31 RX ADMIN — ACETAMINOPHEN 650 MG: 325 TABLET ORAL at 13:03

## 2024-10-31 RX ADMIN — CHLORHEXIDINE GLUCONATE, 0.12% ORAL RINSE 15 ML: 1.2 SOLUTION DENTAL at 19:59

## 2024-10-31 RX ADMIN — MINERAL OIL, WHITE PETROLATUM: .03; .94 OINTMENT OPHTHALMIC at 09:32

## 2024-10-31 RX ADMIN — POTASSIUM PHOSPHATE, MONOBASIC POTASSIUM PHOSPHATE, DIBASIC 10 MMOL: 224; 236 INJECTION, SOLUTION, CONCENTRATE INTRAVENOUS at 15:25

## 2024-10-31 RX ADMIN — CHLORHEXIDINE GLUCONATE, 0.12% ORAL RINSE 15 ML: 1.2 SOLUTION DENTAL at 09:27

## 2024-10-31 RX ADMIN — HYDROCORTISONE SODIUM SUCCINATE 50 MG: 100 INJECTION, POWDER, FOR SOLUTION INTRAMUSCULAR; INTRAVENOUS at 13:03

## 2024-10-31 RX ADMIN — INSULIN LISPRO 1 UNITS: 100 INJECTION, SOLUTION INTRAVENOUS; SUBCUTANEOUS at 00:04

## 2024-10-31 RX ADMIN — OXYCODONE HYDROCHLORIDE 10 MG: 5 SOLUTION ORAL at 13:03

## 2024-10-31 RX ADMIN — SODIUM CHLORIDE, PRESERVATIVE FREE 40 MG: 5 INJECTION INTRAVENOUS at 09:27

## 2024-10-31 RX ADMIN — BACITRACIN ZINC: 500 OINTMENT TOPICAL at 09:32

## 2024-10-31 RX ADMIN — ACETAMINOPHEN 650 MG: 325 TABLET ORAL at 18:30

## 2024-10-31 RX ADMIN — POLYVINYL ALCOHOL, POVIDONE 1 DROP: 14; 6 SOLUTION/ DROPS OPHTHALMIC at 13:03

## 2024-10-31 RX ADMIN — HEPARIN SODIUM 7500 UNITS: 5000 INJECTION INTRAVENOUS; SUBCUTANEOUS at 05:49

## 2024-10-31 ASSESSMENT — PAIN SCALES - GENERAL
PAINLEVEL_OUTOF10: 0
PAINLEVEL_OUTOF10: 4
PAINLEVEL_OUTOF10: 0

## 2024-10-31 ASSESSMENT — PULMONARY FUNCTION TESTS
PIF_VALUE: 17
PIF_VALUE: 30
PIF_VALUE: 25
PIF_VALUE: 23
PIF_VALUE: 24
PIF_VALUE: 29
PIF_VALUE: 14
PIF_VALUE: 27
PIF_VALUE: 25
PIF_VALUE: 25
PIF_VALUE: 29
PIF_VALUE: 23
PIF_VALUE: 32
PIF_VALUE: 27
PIF_VALUE: 23
PIF_VALUE: 30
PIF_VALUE: 31
PIF_VALUE: 20
PIF_VALUE: 26
PIF_VALUE: 24
PIF_VALUE: 30
PIF_VALUE: 24
PIF_VALUE: 19
PIF_VALUE: 24
PIF_VALUE: 28
PIF_VALUE: 26
PIF_VALUE: 24
PIF_VALUE: 21
PIF_VALUE: 25
PIF_VALUE: 27
PIF_VALUE: 20

## 2024-10-31 NOTE — PLAN OF CARE
Problem: Safety - Medical Restraint  Goal: Remains free of injury from restraints (Restraint for Interference with Medical Device)  Description: INTERVENTIONS:  1. Determine that other, less restrictive measures have been tried or would not be effective before applying the restraint  2. Evaluate the patient's condition at the time of restraint application  3. Inform patient/family regarding the reason for restraint  4. Q2H: Monitor safety, psychosocial status, comfort, nutrition and hydration  Outcome: Progressing    Problem: Discharge Planning  Goal: Discharge to home or other facility with appropriate resources  Outcome: Not Progressing     Problem: Neurosensory - Adult  Goal: Achieves maximal functionality and self care  Outcome: Not Progressing     Problem: Respiratory - Adult  Goal: Achieves optimal ventilation and oxygenation  Outcome: Not Progressing     Problem: Musculoskeletal - Adult  Goal: Return mobility to safest level of function  Outcome: Not Progressing  Goal: Return ADL status to a safe level of function  Outcome: Not Progressing     Problem: Metabolic/Fluid and Electrolytes - Adult  Goal: Electrolytes maintained within normal limits  Outcome: Not Progressing     Problem: Nutrition Deficit:  Goal: Optimize nutritional status  Outcome: Not Progressing

## 2024-10-31 NOTE — FLOWSHEET NOTE
Patient intubated and intermittently agitated/restless attempting to reach for lines and tubes for critical care management despite attempts of redirection/reorientation. Will continue use of bilateral soft wrist restraints at this time. Will continue to assess and monitor for earliest removal.

## 2024-10-31 NOTE — PROGRESS NOTES
Right and Left chest tubes removed at beside with expiratory hold performed on ventilator.  No drainage.  No evidence of infection.  No audible air leak.  No complications.  Pt stable without any changes in vital signs.    Occlusive dressing placed.  Daily CXR to be performed.    Raul Funes DO  Resident, PGY-3  10/30/2024  9:23 PM
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
## 2024-10-31 NOTE — PROGRESS NOTES
Chg double lumen picc Placement 10/31/2024    Product number: [id number]   Lot Number: [id number]      Ultrasound: yes   Left Basilic vein:                Upper Arm Circumference: (CM) 40cm    Size:(FR)/GUAGE 5.5fr/42cm    Exposed Length: (CM) 1cm    Internal Length: (CM) 41cm   Cut: (CM) 13cm   Vein Measurement: 0.60cm              Lidocaine Given: yes-given in picc kit  Mayuri Pascal RN  10/31/2024  3:20 PM      capri

## 2024-10-31 NOTE — PROGRESS NOTES
Department of Orthopedic Surgery  Resident Progress Note    POD 1. Patient seen and examined. Patient stable overnight. Currently intubated and sedated.     VITALS:  BP (!) 141/78   Pulse (!) 110   Temp 98.8 °F (37.1 °C) (Bladder)   Resp 18   Ht 1.854 m (6' 1\")   Wt (!) 158.9 kg (350 lb 5 oz)   SpO2 99%   BMI 46.22 kg/m²     General: in no acute distress and alert    MUSCULOSKELETAL:   Right lower extremity:  Dressing C/D/I, mild strikethrough  Compartments soft and compressible  Calf is soft and nontender  Unable to assess PF/DF/EHL  +2/4 DP & PT pulses, Brisk Cap refill, Toes warm and perfused  Unable to assess distal sensation to Peroneals, Sural, Saphenous, and tibial nrs    CBC:   Lab Results   Component Value Date/Time    WBC 20.5 10/31/2024 04:33 AM    HGB 8.0 10/31/2024 04:33 AM    HCT 25.8 10/31/2024 04:33 AM     10/31/2024 04:33 AM     PT/INR:    Lab Results   Component Value Date/Time    PROTIME 16.1 10/24/2024 06:17 PM    INR 1.5 10/24/2024 06:17 PM       ASSESSMENT  S/P right posterior column ORIF - 10/30/24    PLAN      Continue physical therapy and protocol: NWB - B LE  24 hour abx coverage to be completed today  Deep venous thrombosis prophylaxis - per SICU, early mobilization  Can take off the dressing at the surgical site seven days after the date of surgery. Can just peel off. After, do daily dressing changes as needed until the drainage from the surgical site ceases.  PT/OT  Pain Control: IV and PO  Patient will need ORIF of the left pelvis and left tibia plateau   Need CT L knee    Electronically signed by Percy Rico DO on 10/31/2024 at 6:47 AM    Orthopaedic Attending    I have seen and evaluated the patient with the resident and agree with the above assessments on today's visit. I have performed the key components of the history and physical examination and concur completely with the findings and plans as documented above.    Continue with SICU management.  Possible return to

## 2024-10-31 NOTE — PROGRESS NOTES
The patient was unable to communicate.  I offered a moment of silent presence and prayer.  If you need further ministry, please call us at [phone].    Tanna and Peace,    Rev. Brandyn Red MA,
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
## 2024-11-01 ENCOUNTER — APPOINTMENT (OUTPATIENT)
Dept: GENERAL RADIOLOGY | Age: 49
End: 2024-11-01
Payer: MEDICARE

## 2024-11-01 LAB
AADO2: 379 MMHG
ABO/RH: NORMAL
ALBUMIN SERPL-MCNC: 2.1 G/DL (ref 3.5–5.2)
ALBUMIN SERPL-MCNC: 2.2 G/DL (ref 3.5–5.2)
ALBUMIN SERPL-MCNC: 2.5 G/DL (ref 3.5–5.2)
ALP SERPL-CCNC: 195 U/L (ref 40–129)
ALP SERPL-CCNC: 213 U/L (ref 40–129)
ALP SERPL-CCNC: 217 U/L (ref 40–129)
ALT SERPL-CCNC: 157 U/L (ref 0–40)
ALT SERPL-CCNC: 171 U/L (ref 0–40)
ALT SERPL-CCNC: 177 U/L (ref 0–40)
ANION GAP SERPL CALCULATED.3IONS-SCNC: 5 MMOL/L (ref 7–16)
ANION GAP SERPL CALCULATED.3IONS-SCNC: 6 MMOL/L (ref 7–16)
ANION GAP SERPL CALCULATED.3IONS-SCNC: 6 MMOL/L (ref 7–16)
ANTIBODY SCREEN: NEGATIVE
ARM BAND NUMBER: NORMAL
AST SERPL-CCNC: 109 U/L (ref 0–39)
AST SERPL-CCNC: 118 U/L (ref 0–39)
AST SERPL-CCNC: 127 U/L (ref 0–39)
B.E.: 12.7 MMOL/L (ref -3–3)
BASOPHILS # BLD: 0 K/UL (ref 0–0.2)
BASOPHILS # BLD: 0 K/UL (ref 0–0.2)
BASOPHILS # BLD: 0.2 K/UL (ref 0–0.2)
BASOPHILS NFR BLD: 0 % (ref 0–2)
BASOPHILS NFR BLD: 0 % (ref 0–2)
BASOPHILS NFR BLD: 1 % (ref 0–2)
BILIRUB SERPL-MCNC: 1.3 MG/DL (ref 0–1.2)
BILIRUB SERPL-MCNC: 1.4 MG/DL (ref 0–1.2)
BILIRUB SERPL-MCNC: 1.5 MG/DL (ref 0–1.2)
BLOOD BANK BLOOD PRODUCT EXPIRATION DATE: NORMAL
BLOOD BANK DISPENSE STATUS: NORMAL
BLOOD BANK ISBT PRODUCT BLOOD TYPE: 9500
BLOOD BANK PRODUCT CODE: NORMAL
BLOOD BANK SAMPLE EXPIRATION: NORMAL
BLOOD BANK UNIT TYPE AND RH: NORMAL
BPU ID: NORMAL
BUN SERPL-MCNC: 50 MG/DL (ref 6–20)
BUN SERPL-MCNC: 51 MG/DL (ref 6–20)
BUN SERPL-MCNC: 53 MG/DL (ref 6–20)
CA-I BLD-SCNC: 1.07 MMOL/L (ref 1.15–1.33)
CA-I BLD-SCNC: 1.08 MMOL/L (ref 1.15–1.33)
CA-I BLD-SCNC: 1.08 MMOL/L (ref 1.15–1.33)
CALCIUM SERPL-MCNC: 7.9 MG/DL (ref 8.6–10.2)
CALCIUM SERPL-MCNC: 8 MG/DL (ref 8.6–10.2)
CALCIUM SERPL-MCNC: 8 MG/DL (ref 8.6–10.2)
CHLORIDE SERPL-SCNC: 107 MMOL/L (ref 98–107)
CHLORIDE SERPL-SCNC: 107 MMOL/L (ref 98–107)
CHLORIDE SERPL-SCNC: 108 MMOL/L (ref 98–107)
CK SERPL-CCNC: 1755 U/L (ref 20–200)
CO2 SERPL-SCNC: 33 MMOL/L (ref 22–29)
CO2 SERPL-SCNC: 37 MMOL/L (ref 22–29)
CO2 SERPL-SCNC: 38 MMOL/L (ref 22–29)
COHB: 0.3 % (ref 0–1.5)
COMPONENT: NORMAL
CREAT SERPL-MCNC: 1 MG/DL (ref 0.7–1.2)
CREAT SERPL-MCNC: 1.1 MG/DL (ref 0.7–1.2)
CREAT SERPL-MCNC: 1.1 MG/DL (ref 0.7–1.2)
CRITICAL: ABNORMAL
CROSSMATCH RESULT: NORMAL
DATE ANALYZED: ABNORMAL
DATE OF COLLECTION: ABNORMAL
EOSINOPHIL # BLD: 0 K/UL (ref 0.05–0.5)
EOSINOPHIL # BLD: 0 K/UL (ref 0.05–0.5)
EOSINOPHIL # BLD: 0.22 K/UL (ref 0.05–0.5)
EOSINOPHILS RELATIVE PERCENT: 0 % (ref 0–6)
EOSINOPHILS RELATIVE PERCENT: 0 % (ref 0–6)
EOSINOPHILS RELATIVE PERCENT: 1 % (ref 0–6)
ERYTHROCYTE [DISTWIDTH] IN BLOOD BY AUTOMATED COUNT: 17.4 % (ref 11.5–15)
ERYTHROCYTE [DISTWIDTH] IN BLOOD BY AUTOMATED COUNT: 17.5 % (ref 11.5–15)
ERYTHROCYTE [DISTWIDTH] IN BLOOD BY AUTOMATED COUNT: 17.5 % (ref 11.5–15)
FIO2: 70 %
GFR, ESTIMATED: 84 ML/MIN/1.73M2
GFR, ESTIMATED: 86 ML/MIN/1.73M2
GFR, ESTIMATED: 88 ML/MIN/1.73M2
GLUCOSE BLD-MCNC: 256 MG/DL (ref 74–99)
GLUCOSE BLD-MCNC: 257 MG/DL (ref 74–99)
GLUCOSE BLD-MCNC: 271 MG/DL (ref 74–99)
GLUCOSE SERPL-MCNC: 212 MG/DL (ref 74–99)
GLUCOSE SERPL-MCNC: 254 MG/DL (ref 74–99)
GLUCOSE SERPL-MCNC: 268 MG/DL (ref 74–99)
HCO3: 36.6 MMOL/L (ref 22–26)
HCT VFR BLD AUTO: 23.9 % (ref 37–54)
HCT VFR BLD AUTO: 24.2 % (ref 37–54)
HCT VFR BLD AUTO: 24.3 % (ref 37–54)
HCT VFR BLD AUTO: 27.8 % (ref 37–54)
HGB BLD-MCNC: 7.3 G/DL (ref 12.5–16.5)
HGB BLD-MCNC: 7.4 G/DL (ref 12.5–16.5)
HGB BLD-MCNC: 7.4 G/DL (ref 12.5–16.5)
HGB BLD-MCNC: 8.3 G/DL (ref 12.5–16.5)
HHB: 6.5 % (ref 0–5)
LAB: ABNORMAL
LYMPHOCYTES NFR BLD: 0.88 K/UL (ref 1.5–4)
LYMPHOCYTES NFR BLD: 1.72 K/UL (ref 1.5–4)
LYMPHOCYTES NFR BLD: 2.78 K/UL (ref 1.5–4)
LYMPHOCYTES RELATIVE PERCENT: 12 % (ref 20–42)
LYMPHOCYTES RELATIVE PERCENT: 4 % (ref 20–42)
LYMPHOCYTES RELATIVE PERCENT: 7 % (ref 20–42)
Lab: 556
MAGNESIUM SERPL-MCNC: 2.2 MG/DL (ref 1.6–2.6)
MAGNESIUM SERPL-MCNC: 2.3 MG/DL (ref 1.6–2.6)
MAGNESIUM SERPL-MCNC: 2.4 MG/DL (ref 1.6–2.6)
MCH RBC QN AUTO: 28.6 PG (ref 26–35)
MCH RBC QN AUTO: 28.8 PG (ref 26–35)
MCH RBC QN AUTO: 29.1 PG (ref 26–35)
MCHC RBC AUTO-ENTMCNC: 30.5 G/DL (ref 32–34.5)
MCHC RBC AUTO-ENTMCNC: 30.5 G/DL (ref 32–34.5)
MCHC RBC AUTO-ENTMCNC: 30.6 G/DL (ref 32–34.5)
MCV RBC AUTO: 93.8 FL (ref 80–99.9)
MCV RBC AUTO: 94.2 FL (ref 80–99.9)
MCV RBC AUTO: 95.2 FL (ref 80–99.9)
METAMYELOCYTES ABSOLUTE COUNT: 0.22 K/UL (ref 0–0.12)
METAMYELOCYTES ABSOLUTE COUNT: 0.22 K/UL (ref 0–0.12)
METAMYELOCYTES ABSOLUTE COUNT: 0.4 K/UL (ref 0–0.12)
METAMYELOCYTES: 1 % (ref 0–1)
METAMYELOCYTES: 1 % (ref 0–1)
METAMYELOCYTES: 2 % (ref 0–1)
METHB: 0 % (ref 0–1.5)
MODE: AC
MONOCYTES NFR BLD: 0.79 K/UL (ref 0.1–0.95)
MONOCYTES NFR BLD: 1.55 K/UL (ref 0.1–0.95)
MONOCYTES NFR BLD: 1.72 K/UL (ref 0.1–0.95)
MONOCYTES NFR BLD: 4 % (ref 2–12)
MONOCYTES NFR BLD: 6 % (ref 2–12)
MONOCYTES NFR BLD: 7 % (ref 2–12)
MYELOCYTES ABSOLUTE COUNT: 0.2 K/UL
MYELOCYTES ABSOLUTE COUNT: 0.88 K/UL
MYELOCYTES: 1 %
MYELOCYTES: 4 %
NEUTROPHILS NFR BLD: 81 % (ref 43–80)
NEUTROPHILS NFR BLD: 83 % (ref 43–80)
NEUTROPHILS NFR BLD: 85 % (ref 43–80)
NEUTS SEG NFR BLD: 18.04 K/UL (ref 1.8–7.3)
NEUTS SEG NFR BLD: 20.21 K/UL (ref 1.8–7.3)
NEUTS SEG NFR BLD: 21.65 K/UL (ref 1.8–7.3)
NUCLEATED RED BLOOD CELLS: 5 PER 100 WBC
NUCLEATED RED BLOOD CELLS: 9 PER 100 WBC
NUCLEATED RED BLOOD CELLS: 9 PER 100 WBC
O2 SATURATION: 93.5 % (ref 92–98.5)
O2HB: 93.2 % (ref 94–97)
OPERATOR ID: 2577
PATIENT TEMP: 37 C
PCO2: 44.8 MMHG (ref 35–45)
PEEP/CPAP: 10 CMH2O
PFO2: 1.03 MMHG/%
PH BLOOD GAS: 7.53 (ref 7.35–7.45)
PHOSPHATE SERPL-MCNC: 2.3 MG/DL (ref 2.5–4.5)
PHOSPHATE SERPL-MCNC: 2.5 MG/DL (ref 2.5–4.5)
PHOSPHATE SERPL-MCNC: 2.6 MG/DL (ref 2.5–4.5)
PLATELET # BLD AUTO: 329 K/UL (ref 130–450)
PLATELET # BLD AUTO: 358 K/UL (ref 130–450)
PLATELET # BLD AUTO: 365 K/UL (ref 130–450)
PMV BLD AUTO: 10.9 FL (ref 7–12)
PMV BLD AUTO: 11 FL (ref 7–12)
PMV BLD AUTO: 11.2 FL (ref 7–12)
PO2: 71.9 MMHG (ref 75–100)
POTASSIUM SERPL-SCNC: 3.7 MMOL/L (ref 3.5–5)
POTASSIUM SERPL-SCNC: 4.1 MMOL/L (ref 3.5–5)
POTASSIUM SERPL-SCNC: 4.2 MMOL/L (ref 3.5–5)
PROMYELOCYTES ABSOLUTE COUNT: 0.22 K/UL
PROMYELOCYTES ABSOLUTE COUNT: 0.22 K/UL
PROMYELOCYTES: 1 %
PROMYELOCYTES: 1 %
PROT SERPL-MCNC: 5 G/DL (ref 6.4–8.3)
PROT SERPL-MCNC: 5 G/DL (ref 6.4–8.3)
PROT SERPL-MCNC: 5.1 G/DL (ref 6.4–8.3)
RBC # BLD AUTO: 2.51 M/UL (ref 3.8–5.8)
RBC # BLD AUTO: 2.57 M/UL (ref 3.8–5.8)
RBC # BLD AUTO: 2.59 M/UL (ref 3.8–5.8)
RBC # BLD: ABNORMAL 10*6/UL
RI(T): 5.27
RR MECHANICAL: 16 B/MIN
SODIUM SERPL-SCNC: 147 MMOL/L (ref 132–146)
SODIUM SERPL-SCNC: 149 MMOL/L (ref 132–146)
SODIUM SERPL-SCNC: 151 MMOL/L (ref 132–146)
SOURCE, BLOOD GAS: ABNORMAL
THB: 8.2 G/DL (ref 11.5–16.5)
TIME ANALYZED: 557
TRANSFUSION STATUS: NORMAL
UNIT DIVISION: 0
UNIT ISSUE DATE/TIME: NORMAL
VT MECHANICAL: 500 ML
WBC OTHER # BLD: 22.4 K/UL (ref 4.5–11.5)
WBC OTHER # BLD: 24.3 K/UL (ref 4.5–11.5)
WBC OTHER # BLD: 25.4 K/UL (ref 4.5–11.5)

## 2024-11-01 PROCEDURE — 6370000000 HC RX 637 (ALT 250 FOR IP)

## 2024-11-01 PROCEDURE — 82962 GLUCOSE BLOOD TEST: CPT

## 2024-11-01 PROCEDURE — 2580000003 HC RX 258

## 2024-11-01 PROCEDURE — 2580000003 HC RX 258: Performed by: SURGERY

## 2024-11-01 PROCEDURE — 36430 TRANSFUSION BLD/BLD COMPNT: CPT

## 2024-11-01 PROCEDURE — 86920 COMPATIBILITY TEST SPIN: CPT

## 2024-11-01 PROCEDURE — 2000000000 HC ICU R&B

## 2024-11-01 PROCEDURE — 82805 BLOOD GASES W/O2 SATURATION: CPT

## 2024-11-01 PROCEDURE — 99291 CRITICAL CARE FIRST HOUR: CPT | Performed by: SURGERY

## 2024-11-01 PROCEDURE — 2580000003 HC RX 258: Performed by: INTERNAL MEDICINE

## 2024-11-01 PROCEDURE — 6360000002 HC RX W HCPCS

## 2024-11-01 PROCEDURE — 82550 ASSAY OF CK (CPK): CPT

## 2024-11-01 PROCEDURE — 86850 RBC ANTIBODY SCREEN: CPT

## 2024-11-01 PROCEDURE — 83735 ASSAY OF MAGNESIUM: CPT

## 2024-11-01 PROCEDURE — 86901 BLOOD TYPING SEROLOGIC RH(D): CPT

## 2024-11-01 PROCEDURE — 80053 COMPREHEN METABOLIC PANEL: CPT

## 2024-11-01 PROCEDURE — 6360000002 HC RX W HCPCS: Performed by: INTERNAL MEDICINE

## 2024-11-01 PROCEDURE — 84100 ASSAY OF PHOSPHORUS: CPT

## 2024-11-01 PROCEDURE — 82330 ASSAY OF CALCIUM: CPT

## 2024-11-01 PROCEDURE — P9016 RBC LEUKOCYTES REDUCED: HCPCS

## 2024-11-01 PROCEDURE — 2500000003 HC RX 250 WO HCPCS

## 2024-11-01 PROCEDURE — 94003 VENT MGMT INPAT SUBQ DAY: CPT

## 2024-11-01 PROCEDURE — 85025 COMPLETE CBC W/AUTO DIFF WBC: CPT

## 2024-11-01 PROCEDURE — 85014 HEMATOCRIT: CPT

## 2024-11-01 PROCEDURE — 37799 UNLISTED PX VASCULAR SURGERY: CPT

## 2024-11-01 PROCEDURE — 86900 BLOOD TYPING SEROLOGIC ABO: CPT

## 2024-11-01 PROCEDURE — 85018 HEMOGLOBIN: CPT

## 2024-11-01 PROCEDURE — 6370000000 HC RX 637 (ALT 250 FOR IP): Performed by: INTERNAL MEDICINE

## 2024-11-01 PROCEDURE — 71045 X-RAY EXAM CHEST 1 VIEW: CPT

## 2024-11-01 RX ORDER — SODIUM CHLORIDE 9 MG/ML
INJECTION, SOLUTION INTRAVENOUS PRN
Status: DISCONTINUED | OUTPATIENT
Start: 2024-11-01 | End: 2024-11-02

## 2024-11-01 RX ORDER — ACETAMINOPHEN 160 MG/5ML
650 LIQUID ORAL EVERY 4 HOURS PRN
Status: DISCONTINUED | OUTPATIENT
Start: 2024-11-01 | End: 2024-11-04

## 2024-11-01 RX ADMIN — ACETAMINOPHEN 650 MG: 325 TABLET ORAL at 00:15

## 2024-11-01 RX ADMIN — POLYVINYL ALCOHOL, POVIDONE 1 DROP: 14; 6 SOLUTION/ DROPS OPHTHALMIC at 23:55

## 2024-11-01 RX ADMIN — Medication 175 MCG/HR: at 09:01

## 2024-11-01 RX ADMIN — OXYCODONE HYDROCHLORIDE 10 MG: 5 SOLUTION ORAL at 00:12

## 2024-11-01 RX ADMIN — Medication 175 MCG/HR: at 03:33

## 2024-11-01 RX ADMIN — SODIUM CHLORIDE, PRESERVATIVE FREE 10 ML: 5 INJECTION INTRAVENOUS at 20:15

## 2024-11-01 RX ADMIN — BACITRACIN ZINC: 500 OINTMENT TOPICAL at 11:32

## 2024-11-01 RX ADMIN — INSULIN LISPRO 4 UNITS: 100 INJECTION, SOLUTION INTRAVENOUS; SUBCUTANEOUS at 00:21

## 2024-11-01 RX ADMIN — SODIUM CHLORIDE, PRESERVATIVE FREE 10 ML: 5 INJECTION INTRAVENOUS at 09:22

## 2024-11-01 RX ADMIN — Medication 50 MCG: at 16:19

## 2024-11-01 RX ADMIN — Medication 250 MG: at 20:14

## 2024-11-01 RX ADMIN — Medication 50 MCG: at 08:41

## 2024-11-01 RX ADMIN — HYDROCORTISONE SODIUM SUCCINATE 50 MG: 100 INJECTION, POWDER, FOR SOLUTION INTRAMUSCULAR; INTRAVENOUS at 00:12

## 2024-11-01 RX ADMIN — OXYCODONE HYDROCHLORIDE 10 MG: 5 SOLUTION ORAL at 11:33

## 2024-11-01 RX ADMIN — PROPOFOL 30 MCG/KG/MIN: 10 INJECTION, EMULSION INTRAVENOUS at 14:19

## 2024-11-01 RX ADMIN — ACETAMINOPHEN 650 MG: 325 TABLET ORAL at 05:42

## 2024-11-01 RX ADMIN — CALCIUM GLUCONATE 4000 MG: 98 INJECTION, SOLUTION INTRAVENOUS at 02:10

## 2024-11-01 RX ADMIN — POLYETHYLENE GLYCOL 3350 17 G: 17 POWDER, FOR SOLUTION ORAL at 08:42

## 2024-11-01 RX ADMIN — SODIUM CHLORIDE, PRESERVATIVE FREE 40 MG: 5 INJECTION INTRAVENOUS at 08:42

## 2024-11-01 RX ADMIN — INSULIN LISPRO 4 UNITS: 100 INJECTION, SOLUTION INTRAVENOUS; SUBCUTANEOUS at 17:19

## 2024-11-01 RX ADMIN — SODIUM CHLORIDE, PRESERVATIVE FREE 40 MG: 5 INJECTION INTRAVENOUS at 20:14

## 2024-11-01 RX ADMIN — HEPARIN SODIUM 7500 UNITS: 5000 INJECTION INTRAVENOUS; SUBCUTANEOUS at 05:41

## 2024-11-01 RX ADMIN — INSULIN LISPRO 4 UNITS: 100 INJECTION, SOLUTION INTRAVENOUS; SUBCUTANEOUS at 11:34

## 2024-11-01 RX ADMIN — CHLORHEXIDINE GLUCONATE, 0.12% ORAL RINSE 15 ML: 1.2 SOLUTION DENTAL at 20:14

## 2024-11-01 RX ADMIN — POLYVINYL ALCOHOL, POVIDONE 1 DROP: 14; 6 SOLUTION/ DROPS OPHTHALMIC at 13:06

## 2024-11-01 RX ADMIN — POTASSIUM BICARBONATE 40 MEQ: 782 TABLET, EFFERVESCENT ORAL at 08:42

## 2024-11-01 RX ADMIN — CEFEPIME 2000 MG: 2 INJECTION, POWDER, FOR SOLUTION INTRAVENOUS at 03:30

## 2024-11-01 RX ADMIN — POTASSIUM BICARBONATE 40 MEQ: 782 TABLET, EFFERVESCENT ORAL at 20:14

## 2024-11-01 RX ADMIN — INSULIN LISPRO 2 UNITS: 100 INJECTION, SOLUTION INTRAVENOUS; SUBCUTANEOUS at 08:55

## 2024-11-01 RX ADMIN — INSULIN LISPRO 4 UNITS: 100 INJECTION, SOLUTION INTRAVENOUS; SUBCUTANEOUS at 20:37

## 2024-11-01 RX ADMIN — BUMETANIDE 0.2 MG/HR: 0.25 INJECTION INTRAMUSCULAR; INTRAVENOUS at 08:47

## 2024-11-01 RX ADMIN — PROPOFOL 30 MCG/KG/MIN: 10 INJECTION, EMULSION INTRAVENOUS at 03:29

## 2024-11-01 RX ADMIN — POLYVINYL ALCOHOL, POVIDONE 1 DROP: 14; 6 SOLUTION/ DROPS OPHTHALMIC at 16:55

## 2024-11-01 RX ADMIN — HYDROCORTISONE SODIUM SUCCINATE 50 MG: 100 INJECTION, POWDER, FOR SOLUTION INTRAMUSCULAR; INTRAVENOUS at 17:32

## 2024-11-01 RX ADMIN — CEFEPIME 2000 MG: 2 INJECTION, POWDER, FOR SOLUTION INTRAVENOUS at 08:59

## 2024-11-01 RX ADMIN — PROPOFOL 35 MCG/KG/MIN: 10 INJECTION, EMULSION INTRAVENOUS at 10:26

## 2024-11-01 RX ADMIN — PROPOFOL 30 MCG/KG/MIN: 10 INJECTION, EMULSION INTRAVENOUS at 07:07

## 2024-11-01 RX ADMIN — Medication 175 MCG/HR: at 15:46

## 2024-11-01 RX ADMIN — HEPARIN SODIUM 7500 UNITS: 5000 INJECTION INTRAVENOUS; SUBCUTANEOUS at 21:30

## 2024-11-01 RX ADMIN — HEPARIN SODIUM 7500 UNITS: 5000 INJECTION INTRAVENOUS; SUBCUTANEOUS at 13:06

## 2024-11-01 RX ADMIN — Medication 175 MCG/HR: at 21:15

## 2024-11-01 RX ADMIN — OXYCODONE HYDROCHLORIDE 10 MG: 5 SOLUTION ORAL at 05:42

## 2024-11-01 RX ADMIN — Medication 250 MG: at 17:32

## 2024-11-01 RX ADMIN — GABAPENTIN 300 MG: 300 CAPSULE ORAL at 20:14

## 2024-11-01 RX ADMIN — Medication 250 MG: at 11:33

## 2024-11-01 RX ADMIN — CHLORHEXIDINE GLUCONATE, 0.12% ORAL RINSE 15 ML: 1.2 SOLUTION DENTAL at 08:42

## 2024-11-01 RX ADMIN — POLYVINYL ALCOHOL, POVIDONE 1 DROP: 14; 6 SOLUTION/ DROPS OPHTHALMIC at 05:41

## 2024-11-01 RX ADMIN — MINERAL OIL, WHITE PETROLATUM: .03; .94 OINTMENT OPHTHALMIC at 08:42

## 2024-11-01 RX ADMIN — POLYVINYL ALCOHOL, POVIDONE 1 DROP: 14; 6 SOLUTION/ DROPS OPHTHALMIC at 00:12

## 2024-11-01 RX ADMIN — HYDROCORTISONE SODIUM SUCCINATE 50 MG: 100 INJECTION, POWDER, FOR SOLUTION INTRAMUSCULAR; INTRAVENOUS at 11:33

## 2024-11-01 RX ADMIN — PROPOFOL 45 MCG/KG/MIN: 10 INJECTION, EMULSION INTRAVENOUS at 21:15

## 2024-11-01 RX ADMIN — GABAPENTIN 300 MG: 300 CAPSULE ORAL at 08:42

## 2024-11-01 RX ADMIN — ACETAMINOPHEN 650 MG: 325 TABLET ORAL at 11:33

## 2024-11-01 RX ADMIN — Medication 50 MCG: at 06:59

## 2024-11-01 RX ADMIN — CEFEPIME 2000 MG: 2 INJECTION, POWDER, FOR SOLUTION INTRAVENOUS at 17:05

## 2024-11-01 RX ADMIN — HYDROCORTISONE SODIUM SUCCINATE 50 MG: 100 INJECTION, POWDER, FOR SOLUTION INTRAMUSCULAR; INTRAVENOUS at 05:43

## 2024-11-01 RX ADMIN — OXYCODONE HYDROCHLORIDE 10 MG: 5 SOLUTION ORAL at 18:27

## 2024-11-01 RX ADMIN — POTASSIUM PHOSPHATE, MONOBASIC AND POTASSIUM PHOSPHATE, DIBASIC 30 MMOL: 224; 236 INJECTION, SOLUTION, CONCENTRATE INTRAVENOUS at 01:12

## 2024-11-01 RX ADMIN — POLYVINYL ALCOHOL, POVIDONE 1 DROP: 14; 6 SOLUTION/ DROPS OPHTHALMIC at 20:14

## 2024-11-01 ASSESSMENT — PAIN SCALES - GENERAL
PAINLEVEL_OUTOF10: 0
PAINLEVEL_OUTOF10: 7
PAINLEVEL_OUTOF10: 7
PAINLEVEL_OUTOF10: 0
PAINLEVEL_OUTOF10: 7
PAINLEVEL_OUTOF10: 0
PAINLEVEL_OUTOF10: 7

## 2024-11-01 ASSESSMENT — PULMONARY FUNCTION TESTS
PIF_VALUE: 25
PIF_VALUE: 24
PIF_VALUE: 24
PIF_VALUE: 16
PIF_VALUE: 26
PIF_VALUE: 22
PIF_VALUE: 20
PIF_VALUE: 20
PIF_VALUE: 33
PIF_VALUE: 23
PIF_VALUE: 26
PIF_VALUE: 18
PIF_VALUE: 22
PIF_VALUE: 19
PIF_VALUE: 22
PIF_VALUE: 21
PIF_VALUE: 18
PIF_VALUE: 16
PIF_VALUE: 24
PIF_VALUE: 15
PIF_VALUE: 22
PIF_VALUE: 30
PIF_VALUE: 18
PIF_VALUE: 18
PIF_VALUE: 23
PIF_VALUE: 21
PIF_VALUE: 18
PIF_VALUE: 22
PIF_VALUE: 17
PIF_VALUE: 32
PIF_VALUE: 24
PIF_VALUE: 25
PIF_VALUE: 22
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
## 2024-11-01 NOTE — PROGRESS NOTES
Spoke with IV team reviewing CXR of placement of PICC line post pull back on 11/1. Notified ICU resident of placement and confirmed with resident that PICC line is ok to use.

## 2024-11-01 NOTE — PROGRESS NOTES
Department of Orthopedic Surgery  Resident Progress Note    POD 2. Patient seen and examined. Patient stable overnight. Currently intubated and sedated.     VITALS:  /63   Pulse (!) 121   Temp (!) 100.6 °F (38.1 °C) (Bladder)   Resp 20   Ht 1.854 m (6' 1\")   Wt (!) 158.1 kg (348 lb 8.8 oz)   SpO2 94%   BMI 45.99 kg/m²     General: in no acute distress, somnolent, responds to pain    MUSCULOSKELETAL:   Right lower extremity:  Dressing C/D/I, central saturation contained within boarders of dressing  Compartments soft and compressible  Calf is soft and nontender  Unable to assess PF/DF/EHL  +2/4 DP & PT pulses, Brisk Cap refill, Toes warm and perfused  Unable to assess distal sensation to Peroneals, Sural, Saphenous, and tibial nrs    CBC:   Lab Results   Component Value Date/Time    WBC 24.3 11/01/2024 05:32 AM    HGB 7.4 11/01/2024 05:32 AM    HCT 24.2 11/01/2024 05:32 AM     11/01/2024 05:32 AM     PT/INR:    Lab Results   Component Value Date/Time    PROTIME 16.1 10/24/2024 06:17 PM    INR 1.5 10/24/2024 06:17 PM       ASSESSMENT  S/P right posterior column ORIF - 10/30/24  Left periprosthetic posterio wall posterior column fracture  Left greater trochanteric fracture    PLAN      Continue physical therapy and protocol: NWB - B LE  Abx per admitting  Deep venous thrombosis prophylaxis - per SICU, early mobilization  Can take off the dressing at the surgical site seven days after the date of surgery. Can just peel off. After, do daily dressing changes as needed until the drainage from the surgical site ceases.  PT/OT  Pain Control: IV and PO  Patient will need ORIF of the left pelvis and left tibia plateau   Surgical timing pending    Electronically signed by Aldo Gaines DO on 11/1/2024 at 6:36 AM
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
## 2024-11-01 NOTE — PLAN OF CARE
Problem: Pain  Goal: Verbalizes/displays adequate comfort level or baseline comfort level  Outcome: Progressing     Problem: Safety - Adult  Goal: Free from fall injury  Outcome: Progressing     Problem: Safety - Medical Restraint  Goal: Remains free of injury from restraints (Restraint for Interference with Medical Device)  Description: INTERVENTIONS:  1. Determine that other, less restrictive measures have been tried or would not be effective before applying the restraint  2. Evaluate the patient's condition at the time of restraint application  3. Inform patient/family regarding the reason for restraint  4. Q2H: Monitor safety, psychosocial status, comfort, nutrition and hydration  11/1/2024 0731 by Elicia Nguyễn RN  Outcome: Progressing  10/31/2024 2016 by Bianca Angel RN  Outcome: Progressing  Flowsheets (Taken 10/31/2024 2000)  Remains free of injury from restraints (restraint for interference with medical device): Every 2 hours: Monitor safety, psychosocial status, comfort, nutrition and hydration     Problem: Chronic Conditions and Co-morbidities  Goal: Patient's chronic conditions and co-morbidity symptoms are monitored and maintained or improved  Outcome: Progressing     Problem: Neurosensory - Adult  Goal: Achieves stable or improved neurological status  Outcome: Progressing     Problem: Respiratory - Adult  Goal: Achieves optimal ventilation and oxygenation  11/1/2024 0731 by Elicia Nguyễn RN  Outcome: Progressing  11/1/2024 0004 by Emily Aranda RCP  Outcome: Progressing

## 2024-11-01 NOTE — PROGRESS NOTES
11/01/24 0806   Patient Observation   Pulse (!) 121   SpO2 95 %   Vent Information   Ventilator Day(s) 9   Ventilator ID [id number]   Vent Mode AC/PRVC   Ventilator Settings   FiO2  70 %   Insp Time (sec) 0.8 sec   Vt (Set, mL) 500 mL   Resp Rate (Set) 16 bpm   PEEP/CPAP (cmH2O) 10   Vent Patient Data (Readings)   Vt (Measured) 526 mL   Peak Inspiratory Pressure (cmH2O) 22 cmH2O   Rate Measured 18 br/min   Minute Volume (L/min) 9.45 Liters   Mean Airway Pressure (cmH2O) 14 cmH20   Plateau Pressure (cm H2O) 22 cm H2O   Driving Pressure 12   I:E Ratio 1:3.20   I Time/ I Time % 0.8 s   Backup Apnea On   Backup Rate 16 Breaths Per Minute   Backup Vt 500   Vent Alarm Settings   High Pressure (cmH2O) 45 cmH2O   Low Minute Volume (lpm) 7 L/min   High Minute Volume (lpm) 16 L/min   Low Exhaled Vt (ml) 400 mL   High Exhaled Vt (ml) 900 mL   RR High (bpm) 30 br/min   Apnea (secs) 20 secs   Additional Respiratoray Assessments   Humidification Source Heated wire   Humidification Temp 37   Ambu Bag With Mask At Bedside Yes   ETT    Placement Date/Time: 10/30/24 1342   Present on Admission/Arrival: Yes  Placed By: In ED  Comments: patient intubated and on vent from 3806. will remain intubated and ventilated when returned to unit.   Secured At 25 cm   Measured From Lips   Secured By Commercial tube rebolledo

## 2024-11-01 NOTE — PROGRESS NOTES
11/01/24 1820   Patient Observation   Pulse (!) 108   Respirations 19   SpO2 97 %   Breath Sounds   Breath Sounds Bilateral Coarse crackles;Rhonchi   Right Upper Lobe Coarse crackles;Rales   Right Middle Lobe Coarse crackles   Right Lower Lobe Coarse crackles   Left Upper Lobe Coarse crackles;Rhonchi   Left Lower Lobe Coarse crackles   Vent Information   Ventilator Day(s) 9   Ventilator ID [id number]   Equipment Changed Humidification   Vent Mode AC/PRVC   Ventilator Settings   FiO2  65 %   Insp Time (sec) 0.8 sec   Vt (Set, mL) 500 mL   Resp Rate (Set) 16 bpm   PEEP/CPAP (cmH2O) 10   Vent Patient Data (Readings)   Vt (Measured) 537 mL   Peak Inspiratory Pressure (cmH2O) 30 cmH2O   Rate Measured 19 br/min   Minute Volume (L/min) 8.72 Liters   Mean Airway Pressure (cmH2O) 15 cmH20   Plateau Pressure (cm H2O) 20 cm H2O   Driving Pressure 10   I:E Ratio 1:3.70   I Time/ I Time % 0.8 s   Backup Apnea On   Backup Rate 16 Breaths Per Minute   Backup Vt 500   Vent Alarm Settings   High Pressure (cmH2O) 45 cmH2O   Low Minute Volume (lpm) 7 L/min   High Minute Volume (lpm) 16 L/min   Low Exhaled Vt (ml) 400 mL   High Exhaled Vt (ml) 900 mL   RR Low (bpm) 16   RR High (bpm) 30 br/min   Apnea (secs) 20 secs   Additional Respiratoray Assessments   Humidification Source Heated wire   Humidification Temp 36.8   Circuit Condensation Drained   Ambu Bag With Mask At Bedside Yes   Airway Clearance   Suction ET Tube;Oral   Suction Device Inline suction catheter   Sputum Method Obtained Endotracheal   Sputum Amount Moderate   Sputum Color/Odor Tan;White   Sputum Consistency Thick   ETT    Placement Date/Time: 10/30/24 1342   Present on Admission/Arrival: Yes  Placed By: In ED  Comments: patient intubated and on vent from 3806. will remain intubated and ventilated when returned to unit.   Secured At 25 cm   Measured From Lips   Secured By Commercial tube rebolledo

## 2024-11-01 NOTE — PROGRESS NOTES
11/01/24 1532   Patient Observation   Pulse 98   Respirations 18   SpO2 97 %   Vent Information   Ventilator Day(s) 9   Ventilator ID [id number]   Vent Mode AC/PRVC   Ventilator Settings   FiO2  (S)  65 %  (rt titrated)   Vt (Set, mL) 500 mL   Resp Rate (Set) 16 bpm   PEEP/CPAP (cmH2O) 10   Vent Patient Data (Readings)   Vt (Measured) 834 mL   Peak Inspiratory Pressure (cmH2O) 33 cmH2O   Rate Measured 18 br/min   Minute Volume (L/min) 9.65 Liters   Mean Airway Pressure (cmH2O) 14 cmH20   Plateau Pressure (cm H2O) 20 cm H2O   Driving Pressure 10   I:E Ratio 1:3.70   Static Compliance (L/cm H2O) 29   I Time/ I Time % 0.8 s   Backup Apnea On   Backup Rate 16 Breaths Per Minute   Backup Vt 500   Vent Alarm Settings   High Pressure (cmH2O) 45 cmH2O   Low Minute Volume (lpm) 7 L/min   High Minute Volume (lpm) 16 L/min   Low Exhaled Vt (ml) 400 mL   High Exhaled Vt (ml) 900 mL   RR Low (bpm) 16   RR High (bpm) 30 br/min   Apnea (secs) 20 secs   Additional Respiratoray Assessments   Humidification Source Heated wire   Humidification Temp 36.9   Circuit Condensation Drained   Ambu Bag With Mask At Bedside Yes   Airway Clearance   Suction ET Tube;Oral   Suction Device Inline suction catheter   Sputum Method Obtained Endotracheal   Sputum Amount Large   Sputum Color/Odor Tan   Sputum Consistency Thick   ETT    Placement Date/Time: 10/30/24 1342   Present on Admission/Arrival: Yes  Placed By: In ED  Comments: patient intubated and on vent from 3806. will remain intubated and ventilated when returned to unit.   Secured At 25 cm   Measured From Lips   Secured By Commercial tube rebolledo

## 2024-11-01 NOTE — PLAN OF CARE
Problem: Respiratory - Adult  Goal: Achieves optimal ventilation and oxygenation  11/1/2024 0004 by Emily Aranda RCP  Outcome: Progressing        Date of Service:  11/27/2023    PCP: Josh Mark MD     Chief Complaint:   History of bladder cancer  Bladder biopsy follow up     History of Present Illness:  The patient is a 83-year old male who was last seen in the office on 08/29/2023.      He has a history of left angiomyolipoma of the kidney. A CT urogram from 12/04/2018 revealed a 1.6 cm fat density lesion laterally on left kidney. He also has a long history of BPH and has been on tamsulosin and finasteride. He also has a history of elevated PSA, status post multiple prostate biopsies that were negative dating back to 1998. His prostate biopsies were 12/98, 1/2001, 4/2004, and 1/2005. Previous PSA's are 4.40 from 03/02/2018 and 5.15 from 05/11/2017. Patient also has a history of ED and used Viagra in the past.      In December 2018, patient reported a 2 day history of gross hematuria. A CT urogram from 12/04/2018 demonstrated stable left angiomyolipoma and an 11 mm cyst in the left inferior kidney. Cystoscopy on 12/17/2018 found bladder tumor on the left posterior wall. Cystoscopy bladder biopsy with fulguration and mitomycin on 01/03/2019 revealed noninvasive low-grade papillary urothelial carcinoma.      He returned to the office on 01/10/2022 for a surveillance cystoscopy that revealed no evidence of recurrence.      The patient returned to the office on 07/13/2022 with a renal US from 07/06/2022 which revealed echogenic lesion in the left kidney measuring up 1.8 x 1.3 x 1.6 cm is indeterminant, may represent angiomyolipoma versus other etiology. Patient reported having recollection of fat density in left kidney being present dating back to 2001 found by Dr. Ash.      Patient complained of urgency with small occasional bit of urge incontinence despite being on Flomax 0.4 mg BID daily and Finasteride 5 mg once daily. He denied gross hematuria or recent infections. His AUA score was 20, with a bother score of 1.      The patient returned to  the office on 02/13/2023 for a surveillance cytoscopy.      The patient returned to the office on 03/22/2023 status post cytoscopy with bladder biopsy on 03/07/2023 which revealed bladder lesion with urothelial carcinoma in situ.      The patient returned to the office 6/12/2023 for a cytoscopy. He completed his x6 BCG treatments on 05/11/2023.  No new voiding complaints.  Remains on tamsulosin.  No gross hematuria.  No dysuria.  There was a small eschar on the posterior wall of the bladder.  We discussed options at that time including re-biopsy.  Urine cytology was obtained which was negative for high-grade urothelial cells.       On 06/26/2023 patient followed up for urine cytology which was negative.  There was a eschar on the wall of the bladder patient elected to recheck cysto in 4 weeks.  Patient was eventually taken to the OR 8/15/2023 for biopsy of the eschar sites.  Biopsy negative for malignancy    The patient returns to the office today a surveillance cystoscopy.  Patient denies any gross hematuria.  Voiding symptoms have been stable.  Patient denies dysuria.  He remains on tamsulosin and finasteride..    Medications:  Current Outpatient Medications   Medication Sig Dispense Refill   • tamsulosin (FLOMAX) 0.4 MG Cap TAKE 1 CAPSULE BY MOUTH TWICE A  capsule 1   • cephalexin (Keflex) 500 MG capsule Take 1 capsule by mouth 4 times daily. 8 capsule 0   • furosemide (LASIX) 20 MG tablet TAKE 2 TABLETS BY MOUTH EVERY  tablet 1   • valsartan (DIOVAN) 320 MG tablet TAKE 1 TABLET BY MOUTH EVERY DAY 90 tablet 1   • finasteride (PROSCAR) 5 MG tablet TAKE 1 TABLET BY MOUTH EVERY DAY 90 tablet 3   • pravastatin (PRAVACHOL) 40 MG tablet TAKE 1 TABLET BY MOUTH EVERYDAY AT BEDTIME 90 tablet 4   • traZODone (DESYREL) 50 MG tablet TAKE 1 TABLET BY MOUTH AT BEDTIME AS NEEDED 90 tablet 3   • Cholecalciferol (VITAMIN D3) 5000 units capsule Take by mouth daily.     • aspirin 81 MG tablet Take 81 mg by mouth.      • Cyanocobalamin (VITAMIN B12 PO)        No current facility-administered medications for this visit.       Allergies:  ALLERGIES:  No Known Allergies    Past Medical History:   Past Medical History:   Diagnosis Date   • Abnormal blood chemistry level 2012    BORDERLINE LOW THYROID   • Aortic heart murmur 2018   • Bladder tumor 2018 on cystoscopy with Matteucci   • Depression    • Diverticular disease of colon    • Elevated fasting blood sugar 2018   • Elevated PSA    • Hepatitis 1970   • Hyperlipidemia    • Hypertension    • Hypertrophy of prostate with urinary obstruction    • Mixed hyperlipidemia 2018   • Motion sickness    • Obstructive sleep apnea     doesn't use CPAP   • Renal angiomyolipoma     left   • Restless legs syndrome    • Scoliosis    • Thrombosed hemorrhoids    • Thyroid nodule 2021   • Tinnitus    • Urothelial carcinoma of bladder (CMS/HCC)  2019    Resected matteucci    • Vertigo        Family History:  Family History   Problem Relation Age of Onset   • Cancer Brother        Surgical History:  Past Surgical History:   Procedure Laterality Date   • Cystoscopy      with bladder biopsy   • Hemorrhoid surgery     • Prostate biopsy  2005    & 2004, 2001, 1998  - all benign hyperplasia   • Transurethral resection of bladder tumor N/A 2019    TURBT with mitomycin   :    Social History:  Social History     Socioeconomic History   • Marital status:      Spouse name: Not on file   • Number of children: Not on file   • Years of education: Not on file   • Highest education level: Not on file   Occupational History   • Not on file   Tobacco Use   • Smoking status: Former     Current packs/day: 0.00     Types: Cigarettes     Quit date: 1971     Years since quittin.9   • Smokeless tobacco: Never   Vaping Use   • Vaping Use: never used   Substance and Sexual Activity   • Alcohol use: Yes     Comment: Occ.   •  Drug use: No   • Sexual activity: Not on file   Other Topics Concern   • Not on file   Social History Narrative   • Not on file     Social Determinants of Health     Financial Resource Strain: Not on file   Food Insecurity: Not on file   Transportation Needs: Not on file   Physical Activity: Not on file   Stress: Not on file   Social Connections: Not on file   Intimate Partner Violence: Not At Risk (8/7/2023)    Intimate Partner Violence    • Social Determinants: Intimate Partner Violence Past Fear: No    • Social Determinants: Intimate Partner Violence Current Fear: No        Physical Exam:  Visit Vitals  BP (!) 184/84   Pulse 70   Ht 5' 2\" (1.575 m)   Wt 85.3 kg (188 lb)   BMI 34.39 kg/m²       Constitutional:  Well developed, well nourished, afebrile.    In-Office Testing  Results for orders placed or performed in visit on 11/27/23   POCT Urine Dip Auto   Result Value    POCT Color Yellow    POCT Appearance Clear    POCT Glucose Urine Negative    POCT Bilirubin Negative    POCT Ketones Negative    POCT Specific Gravity 1.020    POCT Occult Blood Small (A)    POCT pH 7.0    POCT Protein Negative    POCT Urobilinogen 0.2    Urine Nitrite Negative    WBC (Leukocyte) Esterase POC Negative       PSA:  Lab Results   Component Value Date    PSA 17.40 (H) 11/14/2022    PSA 4.40 (H) 03/02/2018 05/11/2017: Source: Outside Lab, PSA: 5.15  01/11/2017: Source: Office Lab, PSA: 5.8  01/22/2016: Source: Outside Lab, PSA: 4.41  11/24/2014: Source: Outside Lab, PSA: 3.59  02/04/2014: Source: Outside Lab, PSA: 2.87 ng/ml  12/19/2012: Source: Office Lab, PSA: 2.4 ng/ml  12/12/2011: Source: Outside Lab, PSA: 2.56ng/ml  12/15/2010: Source: Outside Lab, PSA: 2.3ng/ml  12/14/2009: Source: Office Lab, PSA: 2.5ng/ml  01/07/2009: Source: Office Lab, PSA: 3.3ng/ml  07/02/2008: Source: Office Lab, PSA: 9.7ng/ml  03/12/2008: Source: Office Lab, PSA: 8.2ng/ml  06/04/2007: Source: Office Lab, PSA: 6.2ng/ml  08/10/2006: Source: Outside Lab,  PSA: 6.0ng/ml  01/12/2006: Source: Outside Lab, PSA: 6  07/08/2005: Source: Outside Lab, PSA: 5.8  10/11/2004: Source: Outside Lab, PSA: 7.8  02/27/2003: Source: Outside Lab, PSA: 7.3  01/23/2003: Source: Outside Lab, PSA: 5.9    Assessment and Plan:    Bladder cancer carcinoma in situ- TCC, grade 1, TA in the posterior wall and dome diagnosed on 01/03/2019.     Cytoscopy bladder biopsy on 03/07/2023 revealed bladder lesion with urothelial carcinoma in situ.       He completed his x6 BCG treatments on 05/11/2023.      Bladder biopsy 08/2023-no evidence of malignancy.     * continue BCG protocol-3 months  treatments per protocol     Cystoscopy today was negative     -follow up for surveillance cystoscopy in 3 months    Left angiomyolipoma- Renal US from 07/06/222 revealed 1.6 stable left angiomyolipoma. Will check renal US in summer 2024     Benign prostatic hyperplasia/Lower urinary tract symptoms- Slightly worse. Complains of urgency but is not bothersome enough to start new medications. We discussed options.     -Continue Flomax 0.4 mg BID and Finasteride 5 mg daily.     Elevated Prostate specific antigen- 4 negative prostate biopsies in the past. Continues on Finasteride.       I had a brief counseling session with the patient concerning the diagnosis and treatment options.     I performed a brief review of the patient's medical records.     I reviewed the data with patient, including diagnosis, prognosis, and treatment.    Tests reviewed: None    Tests ordered:  PVR, Uroflow and Urinalysis    On 11/27/2023, Steve SWANSON scribed the services personally performed by Ceasar Pope Jr., MD.    The documentation recorded by the scribe accurately and completely reflects the service(s) I personally performed and the decisions made by me.      Ceasar Pope Jr., MD [facility]  Department of Urology  [facility]  [phone]

## 2024-11-01 NOTE — PROGRESS NOTES
Left picc retracted 6 cm per D. GUNNER Pascal.. site cleaned with chloroprep scrub.. antimicrobial dressing applied

## 2024-11-01 NOTE — FLOWSHEET NOTE
Patient continues to reach for ett and ngt while performing patient unable to redirect at this time will continue to monitor
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
## 2024-11-01 NOTE — CARE COORDINATION
11/1 Care Coordination: Pt remains in SICU, Cont on vent. on Bumex drip,IV sedation. Bilateral chest tubes.  Extremities-left lower extremity in splint immobilizer  Right lower extremity in traction pin Splint to the right upper extremity. With Current status unclear on discharge needs. Still in need of further Ortho surgeries.  CM/SW will continue to follow for discharge planning.   Toan PATEL,RN--BC  [phone]

## 2024-11-02 ENCOUNTER — APPOINTMENT (OUTPATIENT)
Dept: GENERAL RADIOLOGY | Age: 49
End: 2024-11-02
Payer: MEDICARE

## 2024-11-02 ENCOUNTER — ANESTHESIA EVENT (OUTPATIENT)
Dept: OPERATING ROOM | Age: 49
End: 2024-11-02
Payer: MEDICARE

## 2024-11-02 LAB
AADO2: 333.1 MMHG
ALBUMIN SERPL-MCNC: 2.3 G/DL (ref 3.5–5.2)
ALBUMIN SERPL-MCNC: 2.3 G/DL (ref 3.5–5.2)
ALBUMIN SERPL-MCNC: 2.5 G/DL (ref 3.5–5.2)
ALP SERPL-CCNC: 197 U/L (ref 40–129)
ALP SERPL-CCNC: 203 U/L (ref 40–129)
ALP SERPL-CCNC: 219 U/L (ref 40–129)
ALT SERPL-CCNC: 117 U/L (ref 0–40)
ALT SERPL-CCNC: 136 U/L (ref 0–40)
ALT SERPL-CCNC: 141 U/L (ref 0–40)
ANION GAP SERPL CALCULATED.3IONS-SCNC: 4 MMOL/L (ref 7–16)
ANION GAP SERPL CALCULATED.3IONS-SCNC: 6 MMOL/L (ref 7–16)
ANION GAP SERPL CALCULATED.3IONS-SCNC: 7 MMOL/L (ref 7–16)
AST SERPL-CCNC: 104 U/L (ref 0–39)
AST SERPL-CCNC: 84 U/L (ref 0–39)
AST SERPL-CCNC: 98 U/L (ref 0–39)
B.E.: 9.5 MMOL/L (ref -3–3)
BASOPHILS # BLD: 0 K/UL (ref 0–0.2)
BASOPHILS NFR BLD: 0 % (ref 0–2)
BILIRUB SERPL-MCNC: 1.3 MG/DL (ref 0–1.2)
BILIRUB SERPL-MCNC: 1.4 MG/DL (ref 0–1.2)
BILIRUB SERPL-MCNC: 1.4 MG/DL (ref 0–1.2)
BUN SERPL-MCNC: 51 MG/DL (ref 6–20)
BUN SERPL-MCNC: 52 MG/DL (ref 6–20)
BUN SERPL-MCNC: 53 MG/DL (ref 6–20)
CA-I BLD-SCNC: 1.06 MMOL/L (ref 1.15–1.33)
CA-I BLD-SCNC: 1.08 MMOL/L (ref 1.15–1.33)
CA-I BLD-SCNC: 1.09 MMOL/L (ref 1.15–1.33)
CALCIUM SERPL-MCNC: 7.4 MG/DL (ref 8.6–10.2)
CALCIUM SERPL-MCNC: 7.6 MG/DL (ref 8.6–10.2)
CALCIUM SERPL-MCNC: 8.2 MG/DL (ref 8.6–10.2)
CHLORIDE SERPL-SCNC: 108 MMOL/L (ref 98–107)
CHLORIDE SERPL-SCNC: 109 MMOL/L (ref 98–107)
CHLORIDE SERPL-SCNC: 112 MMOL/L (ref 98–107)
CK SERPL-CCNC: 1650 U/L (ref 20–200)
CO2 SERPL-SCNC: 36 MMOL/L (ref 22–29)
CO2 SERPL-SCNC: 37 MMOL/L (ref 22–29)
CO2 SERPL-SCNC: 39 MMOL/L (ref 22–29)
COHB: 1.5 % (ref 0–1.5)
CREAT SERPL-MCNC: 1 MG/DL (ref 0.7–1.2)
CRITICAL: ABNORMAL
DATE ANALYZED: ABNORMAL
DATE OF COLLECTION: ABNORMAL
EOSINOPHIL # BLD: 0 K/UL (ref 0.05–0.5)
EOSINOPHIL # BLD: 0.5 K/UL (ref 0.05–0.5)
EOSINOPHILS RELATIVE PERCENT: 0 % (ref 0–6)
EOSINOPHILS RELATIVE PERCENT: 2 % (ref 0–6)
ERYTHROCYTE [DISTWIDTH] IN BLOOD BY AUTOMATED COUNT: 18.2 % (ref 11.5–15)
ERYTHROCYTE [DISTWIDTH] IN BLOOD BY AUTOMATED COUNT: 18.5 % (ref 11.5–15)
FIO2: 65 %
GFR, ESTIMATED: 89 ML/MIN/1.73M2
GFR, ESTIMATED: >90 ML/MIN/1.73M2
GFR, ESTIMATED: >90 ML/MIN/1.73M2
GLUCOSE BLD-MCNC: 224 MG/DL (ref 74–99)
GLUCOSE BLD-MCNC: 225 MG/DL (ref 74–99)
GLUCOSE BLD-MCNC: 232 MG/DL (ref 74–99)
GLUCOSE BLD-MCNC: 241 MG/DL (ref 74–99)
GLUCOSE BLD-MCNC: 255 MG/DL (ref 74–99)
GLUCOSE BLD-MCNC: 271 MG/DL (ref 74–99)
GLUCOSE SERPL-MCNC: 237 MG/DL (ref 74–99)
GLUCOSE SERPL-MCNC: 268 MG/DL (ref 74–99)
GLUCOSE SERPL-MCNC: 275 MG/DL (ref 74–99)
HCO3: 33.7 MMOL/L (ref 22–26)
HCT VFR BLD AUTO: 26.7 % (ref 37–54)
HCT VFR BLD AUTO: 26.8 % (ref 37–54)
HCT VFR BLD AUTO: 26.8 % (ref 37–54)
HCT VFR BLD AUTO: 28.8 % (ref 37–54)
HGB BLD-MCNC: 8.1 G/DL (ref 12.5–16.5)
HGB BLD-MCNC: 8.1 G/DL (ref 12.5–16.5)
HGB BLD-MCNC: 8.2 G/DL (ref 12.5–16.5)
HGB BLD-MCNC: 8.5 G/DL (ref 12.5–16.5)
HHB: 3.2 % (ref 0–5)
LAB: ABNORMAL
LYMPHOCYTES NFR BLD: 0.27 K/UL (ref 1.5–4)
LYMPHOCYTES NFR BLD: 0.76 K/UL (ref 1.5–4)
LYMPHOCYTES NFR BLD: 1.24 K/UL (ref 1.5–4)
LYMPHOCYTES NFR BLD: 1.24 K/UL (ref 1.5–4)
LYMPHOCYTES RELATIVE PERCENT: 1 % (ref 20–42)
LYMPHOCYTES RELATIVE PERCENT: 3 % (ref 20–42)
LYMPHOCYTES RELATIVE PERCENT: 4 % (ref 20–42)
LYMPHOCYTES RELATIVE PERCENT: 4 % (ref 20–42)
Lab: 510
MAGNESIUM SERPL-MCNC: 2.3 MG/DL (ref 1.6–2.6)
MAGNESIUM SERPL-MCNC: 2.4 MG/DL (ref 1.6–2.6)
MAGNESIUM SERPL-MCNC: 2.4 MG/DL (ref 1.6–2.6)
MCH RBC QN AUTO: 28.3 PG (ref 26–35)
MCH RBC QN AUTO: 28.6 PG (ref 26–35)
MCH RBC QN AUTO: 28.9 PG (ref 26–35)
MCH RBC QN AUTO: 29.1 PG (ref 26–35)
MCHC RBC AUTO-ENTMCNC: 29.5 G/DL (ref 32–34.5)
MCHC RBC AUTO-ENTMCNC: 30.2 G/DL (ref 32–34.5)
MCHC RBC AUTO-ENTMCNC: 30.3 G/DL (ref 32–34.5)
MCHC RBC AUTO-ENTMCNC: 30.6 G/DL (ref 32–34.5)
MCV RBC AUTO: 94.3 FL (ref 80–99.9)
MCV RBC AUTO: 94.4 FL (ref 80–99.9)
MCV RBC AUTO: 96 FL (ref 80–99.9)
MCV RBC AUTO: 96.4 FL (ref 80–99.9)
METAMYELOCYTES ABSOLUTE COUNT: 0.25 K/UL (ref 0–0.12)
METAMYELOCYTES ABSOLUTE COUNT: 0.25 K/UL (ref 0–0.12)
METAMYELOCYTES ABSOLUTE COUNT: 0.27 K/UL (ref 0–0.12)
METAMYELOCYTES: 1 % (ref 0–1)
METHB: 2.5 % (ref 0–1.5)
MODE: AC
MONOCYTES NFR BLD: 0.25 K/UL (ref 0.1–0.95)
MONOCYTES NFR BLD: 0.25 K/UL (ref 0.1–0.95)
MONOCYTES NFR BLD: 1 % (ref 2–12)
MONOCYTES NFR BLD: 1 % (ref 2–12)
MONOCYTES NFR BLD: 1.27 K/UL (ref 0.1–0.95)
MONOCYTES NFR BLD: 1.64 K/UL (ref 0.1–0.95)
MONOCYTES NFR BLD: 4 % (ref 2–12)
MONOCYTES NFR BLD: 5 % (ref 2–12)
MYELOCYTES ABSOLUTE COUNT: 0.5 K/UL
MYELOCYTES ABSOLUTE COUNT: 0.5 K/UL
MYELOCYTES ABSOLUTE COUNT: 0.51 K/UL
MYELOCYTES: 2 %
NEUTROPHILS NFR BLD: 90 % (ref 43–80)
NEUTROPHILS NFR BLD: 90 % (ref 43–80)
NEUTROPHILS NFR BLD: 93 % (ref 43–80)
NEUTROPHILS NFR BLD: 93 % (ref 43–80)
NEUTS SEG NFR BLD: 25.86 K/UL (ref 1.8–7.3)
NEUTS SEG NFR BLD: 26.32 K/UL (ref 1.8–7.3)
NEUTS SEG NFR BLD: 26.52 K/UL (ref 1.8–7.3)
NEUTS SEG NFR BLD: 28.41 K/UL (ref 1.8–7.3)
NUCLEATED RED BLOOD CELLS: 4 PER 100 WBC
NUCLEATED RED BLOOD CELLS: 4 PER 100 WBC
NUCLEATED RED BLOOD CELLS: 5 PER 100 WBC
NUCLEATED RED BLOOD CELLS: 6 PER 100 WBC
O2 SATURATION: 96.7 % (ref 92–98.5)
O2HB: 92.8 % (ref 94–97)
OPERATOR ID: 2577
PATIENT TEMP: 37 C
PCO2: 44.2 MMHG (ref 35–45)
PEEP/CPAP: 10 CMH2O
PFO2: 1.27 MMHG/%
PH BLOOD GAS: 7.5 (ref 7.35–7.45)
PHOSPHATE SERPL-MCNC: 2.3 MG/DL (ref 2.5–4.5)
PHOSPHATE SERPL-MCNC: 2.6 MG/DL (ref 2.5–4.5)
PHOSPHATE SERPL-MCNC: 2.9 MG/DL (ref 2.5–4.5)
PLATELET # BLD AUTO: 404 K/UL (ref 130–450)
PLATELET # BLD AUTO: 404 K/UL (ref 130–450)
PLATELET # BLD AUTO: 439 K/UL (ref 130–450)
PLATELET # BLD AUTO: 443 K/UL (ref 130–450)
PMV BLD AUTO: 10.6 FL (ref 7–12)
PMV BLD AUTO: 10.8 FL (ref 7–12)
PMV BLD AUTO: 10.9 FL (ref 7–12)
PMV BLD AUTO: 10.9 FL (ref 7–12)
PO2: 82.3 MMHG (ref 75–100)
POTASSIUM SERPL-SCNC: 3.4 MMOL/L (ref 3.5–5)
POTASSIUM SERPL-SCNC: 3.9 MMOL/L (ref 3.5–5)
POTASSIUM SERPL-SCNC: 4.1 MMOL/L (ref 3.5–5)
PROT SERPL-MCNC: 5.1 G/DL (ref 6.4–8.3)
PROT SERPL-MCNC: 5.2 G/DL (ref 6.4–8.3)
PROT SERPL-MCNC: 5.4 G/DL (ref 6.4–8.3)
RBC # BLD AUTO: 2.78 M/UL (ref 3.8–5.8)
RBC # BLD AUTO: 2.83 M/UL (ref 3.8–5.8)
RBC # BLD AUTO: 2.84 M/UL (ref 3.8–5.8)
RBC # BLD AUTO: 3 M/UL (ref 3.8–5.8)
RBC # BLD: ABNORMAL 10*6/UL
RI(T): 4.05
RR MECHANICAL: 16 B/MIN
SODIUM SERPL-SCNC: 151 MMOL/L (ref 132–146)
SODIUM SERPL-SCNC: 152 MMOL/L (ref 132–146)
SODIUM SERPL-SCNC: 155 MMOL/L (ref 132–146)
SOURCE, BLOOD GAS: ABNORMAL
THB: 10 G/DL (ref 11.5–16.5)
TIME ANALYZED: 513
VT MECHANICAL: 500 ML
WBC # BLD: ABNORMAL 10*3/UL
WBC OTHER # BLD: 28.5 K/UL (ref 4.5–11.5)
WBC OTHER # BLD: 28.6 K/UL (ref 4.5–11.5)
WBC OTHER # BLD: 29.1 K/UL (ref 4.5–11.5)
WBC OTHER # BLD: 30.6 K/UL (ref 4.5–11.5)

## 2024-11-02 PROCEDURE — 2500000003 HC RX 250 WO HCPCS

## 2024-11-02 PROCEDURE — 6370000000 HC RX 637 (ALT 250 FOR IP)

## 2024-11-02 PROCEDURE — 82550 ASSAY OF CK (CPK): CPT

## 2024-11-02 PROCEDURE — 82330 ASSAY OF CALCIUM: CPT

## 2024-11-02 PROCEDURE — 31500 INSERT EMERGENCY AIRWAY: CPT

## 2024-11-02 PROCEDURE — 37799 UNLISTED PX VASCULAR SURGERY: CPT

## 2024-11-02 PROCEDURE — 83735 ASSAY OF MAGNESIUM: CPT

## 2024-11-02 PROCEDURE — 71045 X-RAY EXAM CHEST 1 VIEW: CPT

## 2024-11-02 PROCEDURE — 80053 COMPREHEN METABOLIC PANEL: CPT

## 2024-11-02 PROCEDURE — 2580000003 HC RX 258

## 2024-11-02 PROCEDURE — 84100 ASSAY OF PHOSPHORUS: CPT

## 2024-11-02 PROCEDURE — 82805 BLOOD GASES W/O2 SATURATION: CPT

## 2024-11-02 PROCEDURE — 2000000000 HC ICU R&B

## 2024-11-02 PROCEDURE — 2580000003 HC RX 258: Performed by: SURGERY

## 2024-11-02 PROCEDURE — 82962 GLUCOSE BLOOD TEST: CPT

## 2024-11-02 PROCEDURE — 94003 VENT MGMT INPAT SUBQ DAY: CPT

## 2024-11-02 PROCEDURE — 6360000002 HC RX W HCPCS

## 2024-11-02 PROCEDURE — 85025 COMPLETE CBC W/AUTO DIFF WBC: CPT

## 2024-11-02 PROCEDURE — 6370000000 HC RX 637 (ALT 250 FOR IP): Performed by: INTERNAL MEDICINE

## 2024-11-02 PROCEDURE — 99291 CRITICAL CARE FIRST HOUR: CPT | Performed by: SURGERY

## 2024-11-02 RX ORDER — POTASSIUM CHLORIDE 29.8 MG/ML
20 INJECTION INTRAVENOUS
Status: COMPLETED | OUTPATIENT
Start: 2024-11-02 | End: 2024-11-02

## 2024-11-02 RX ADMIN — HYDROCORTISONE SODIUM SUCCINATE 50 MG: 100 INJECTION, POWDER, FOR SOLUTION INTRAMUSCULAR; INTRAVENOUS at 00:01

## 2024-11-02 RX ADMIN — INSULIN LISPRO 4 UNITS: 100 INJECTION, SOLUTION INTRAVENOUS; SUBCUTANEOUS at 08:12

## 2024-11-02 RX ADMIN — POLYVINYL ALCOHOL, POVIDONE 1 DROP: 14; 6 SOLUTION/ DROPS OPHTHALMIC at 04:51

## 2024-11-02 RX ADMIN — INSULIN LISPRO 4 UNITS: 100 INJECTION, SOLUTION INTRAVENOUS; SUBCUTANEOUS at 05:04

## 2024-11-02 RX ADMIN — POLYVINYL ALCOHOL, POVIDONE 1 DROP: 14; 6 SOLUTION/ DROPS OPHTHALMIC at 08:09

## 2024-11-02 RX ADMIN — PROPOFOL 50 MCG/KG/MIN: 10 INJECTION, EMULSION INTRAVENOUS at 20:28

## 2024-11-02 RX ADMIN — INSULIN LISPRO 2 UNITS: 100 INJECTION, SOLUTION INTRAVENOUS; SUBCUTANEOUS at 17:16

## 2024-11-02 RX ADMIN — CEFEPIME 2000 MG: 2 INJECTION, POWDER, FOR SOLUTION INTRAVENOUS at 08:19

## 2024-11-02 RX ADMIN — INSULIN LISPRO 2 UNITS: 100 INJECTION, SOLUTION INTRAVENOUS; SUBCUTANEOUS at 00:00

## 2024-11-02 RX ADMIN — HEPARIN SODIUM 7500 UNITS: 5000 INJECTION INTRAVENOUS; SUBCUTANEOUS at 21:51

## 2024-11-02 RX ADMIN — PROPOFOL 50 MCG/KG/MIN: 10 INJECTION, EMULSION INTRAVENOUS at 11:06

## 2024-11-02 RX ADMIN — INSULIN LISPRO 2 UNITS: 100 INJECTION, SOLUTION INTRAVENOUS; SUBCUTANEOUS at 12:40

## 2024-11-02 RX ADMIN — CEFEPIME 2000 MG: 2 INJECTION, POWDER, FOR SOLUTION INTRAVENOUS at 18:35

## 2024-11-02 RX ADMIN — PROPOFOL 45 MCG/KG/MIN: 10 INJECTION, EMULSION INTRAVENOUS at 05:47

## 2024-11-02 RX ADMIN — BACITRACIN ZINC: 500 OINTMENT TOPICAL at 08:08

## 2024-11-02 RX ADMIN — HYDROCORTISONE SODIUM SUCCINATE 50 MG: 100 INJECTION, POWDER, FOR SOLUTION INTRAMUSCULAR; INTRAVENOUS at 18:38

## 2024-11-02 RX ADMIN — Medication 250 MG: at 08:09

## 2024-11-02 RX ADMIN — OXYCODONE HYDROCHLORIDE 10 MG: 5 SOLUTION ORAL at 12:41

## 2024-11-02 RX ADMIN — OXYCODONE HYDROCHLORIDE 10 MG: 5 SOLUTION ORAL at 02:01

## 2024-11-02 RX ADMIN — HYDROCORTISONE SODIUM SUCCINATE 50 MG: 100 INJECTION, POWDER, FOR SOLUTION INTRAMUSCULAR; INTRAVENOUS at 05:04

## 2024-11-02 RX ADMIN — HYDROCORTISONE SODIUM SUCCINATE 50 MG: 100 INJECTION, POWDER, FOR SOLUTION INTRAMUSCULAR; INTRAVENOUS at 11:54

## 2024-11-02 RX ADMIN — INSULIN LISPRO 2 UNITS: 100 INJECTION, SOLUTION INTRAVENOUS; SUBCUTANEOUS at 19:46

## 2024-11-02 RX ADMIN — SODIUM CHLORIDE, PRESERVATIVE FREE 10 ML: 5 INJECTION INTRAVENOUS at 08:09

## 2024-11-02 RX ADMIN — Medication 175 MCG/HR: at 03:11

## 2024-11-02 RX ADMIN — MINERAL OIL, WHITE PETROLATUM: .03; .94 OINTMENT OPHTHALMIC at 11:55

## 2024-11-02 RX ADMIN — Medication 200 MCG/HR: at 19:21

## 2024-11-02 RX ADMIN — POTASSIUM CHLORIDE 20 MEQ: 29.8 INJECTION, SOLUTION INTRAVENOUS at 08:59

## 2024-11-02 RX ADMIN — HEPARIN SODIUM 7500 UNITS: 5000 INJECTION INTRAVENOUS; SUBCUTANEOUS at 13:37

## 2024-11-02 RX ADMIN — CALCIUM GLUCONATE 2000 MG: 98 INJECTION, SOLUTION INTRAVENOUS at 09:52

## 2024-11-02 RX ADMIN — Medication 200 MCG/HR: at 14:32

## 2024-11-02 RX ADMIN — PROPOFOL 45 MCG/KG/MIN: 10 INJECTION, EMULSION INTRAVENOUS at 03:28

## 2024-11-02 RX ADMIN — GABAPENTIN 300 MG: 300 CAPSULE ORAL at 08:09

## 2024-11-02 RX ADMIN — POTASSIUM CHLORIDE 20 MEQ: 29.8 INJECTION, SOLUTION INTRAVENOUS at 13:37

## 2024-11-02 RX ADMIN — CEFEPIME 2000 MG: 2 INJECTION, POWDER, FOR SOLUTION INTRAVENOUS at 02:02

## 2024-11-02 RX ADMIN — POTASSIUM BICARBONATE 40 MEQ: 782 TABLET, EFFERVESCENT ORAL at 08:09

## 2024-11-02 RX ADMIN — CHLORHEXIDINE GLUCONATE, 0.12% ORAL RINSE 15 ML: 1.2 SOLUTION DENTAL at 19:33

## 2024-11-02 RX ADMIN — OXYCODONE HYDROCHLORIDE 10 MG: 5 SOLUTION ORAL at 19:33

## 2024-11-02 RX ADMIN — HEPARIN SODIUM 7500 UNITS: 5000 INJECTION INTRAVENOUS; SUBCUTANEOUS at 05:30

## 2024-11-02 RX ADMIN — POTASSIUM CHLORIDE 20 MEQ: 29.8 INJECTION, SOLUTION INTRAVENOUS at 11:07

## 2024-11-02 RX ADMIN — PROPOFOL 45 MCG/KG/MIN: 10 INJECTION, EMULSION INTRAVENOUS at 00:03

## 2024-11-02 RX ADMIN — POTASSIUM BICARBONATE 40 MEQ: 782 TABLET, EFFERVESCENT ORAL at 19:33

## 2024-11-02 RX ADMIN — CHLORHEXIDINE GLUCONATE, 0.12% ORAL RINSE 15 ML: 1.2 SOLUTION DENTAL at 08:08

## 2024-11-02 RX ADMIN — GABAPENTIN 300 MG: 300 CAPSULE ORAL at 20:33

## 2024-11-02 RX ADMIN — PROPOFOL 50 MCG/KG/MIN: 10 INJECTION, EMULSION INTRAVENOUS at 08:58

## 2024-11-02 RX ADMIN — PROPOFOL 50 MCG/KG/MIN: 10 INJECTION, EMULSION INTRAVENOUS at 17:12

## 2024-11-02 RX ADMIN — OXYCODONE HYDROCHLORIDE 10 MG: 5 SOLUTION ORAL at 07:07

## 2024-11-02 RX ADMIN — POLYVINYL ALCOHOL, POVIDONE 1 DROP: 14; 6 SOLUTION/ DROPS OPHTHALMIC at 22:56

## 2024-11-02 RX ADMIN — POLYVINYL ALCOHOL, POVIDONE 1 DROP: 14; 6 SOLUTION/ DROPS OPHTHALMIC at 19:33

## 2024-11-02 RX ADMIN — MINERAL OIL, WHITE PETROLATUM: .03; .94 OINTMENT OPHTHALMIC at 17:11

## 2024-11-02 RX ADMIN — SODIUM CHLORIDE, PRESERVATIVE FREE 40 MG: 5 INJECTION INTRAVENOUS at 19:33

## 2024-11-02 RX ADMIN — PROPOFOL 50 MCG/KG/MIN: 10 INJECTION, EMULSION INTRAVENOUS at 22:57

## 2024-11-02 RX ADMIN — INSULIN LISPRO 4 UNITS: 100 INJECTION, SOLUTION INTRAVENOUS; SUBCUTANEOUS at 22:54

## 2024-11-02 RX ADMIN — PROPOFOL 50 MCG/KG/MIN: 10 INJECTION, EMULSION INTRAVENOUS at 13:28

## 2024-11-02 RX ADMIN — SODIUM CHLORIDE, PRESERVATIVE FREE 40 MG: 5 INJECTION INTRAVENOUS at 08:09

## 2024-11-02 RX ADMIN — HYDROCORTISONE SODIUM SUCCINATE 50 MG: 100 INJECTION, POWDER, FOR SOLUTION INTRAMUSCULAR; INTRAVENOUS at 22:56

## 2024-11-02 RX ADMIN — Medication 200 MCG/HR: at 08:58

## 2024-11-02 ASSESSMENT — PULMONARY FUNCTION TESTS
PIF_VALUE: 33
PIF_VALUE: 29
PIF_VALUE: 28
PIF_VALUE: 24
PIF_VALUE: 24
PIF_VALUE: 22
PIF_VALUE: 18
PIF_VALUE: 20
PIF_VALUE: 25
PIF_VALUE: 27
PIF_VALUE: 23
PIF_VALUE: 23
PIF_VALUE: 22
PIF_VALUE: 23
PIF_VALUE: 43
PIF_VALUE: 24
PIF_VALUE: 24
PIF_VALUE: 28
PIF_VALUE: 22
PIF_VALUE: 31
PIF_VALUE: 25
PIF_VALUE: 25
PIF_VALUE: 22
PIF_VALUE: 26
PIF_VALUE: 25
PIF_VALUE: 28
PIF_VALUE: 24

## 2024-11-02 ASSESSMENT — PAIN SCALES - GENERAL
PAINLEVEL_OUTOF10: 0
PAINLEVEL_OUTOF10: 0
PAINLEVEL_OUTOF10: 3
PAINLEVEL_OUTOF10: 1
PAINLEVEL_OUTOF10: 0
PAINLEVEL_OUTOF10: 2

## 2024-11-02 NOTE — PROGRESS NOTES
[facility]  MD Laron Muller MD Ali Hassan, MD Lisa Kniska, CNP   Lo Elmore, BLAIRE Davidson, CNP  Progress note   Patient's Name: Glenroy Simmons II  11:58 AM  11/2/2024        10/26 : seen in his room intubated mechanically ventilated fio2 90 peep 10 massively hyperovlemic , UO ok . No pressors .     10/27 seen in his room remain critically ill , mechanically ventilated no pressors .   We did try SLED yesterday and we could not achieve a good ultrafiltration due to lower blood pressure ,we did start him on a Bumex drip today and has been having excellent urine output in the range of 2  an hour per ICU nursing his azotemia is actually slightly better .  His oxygen requirement are higher and better ICU the plan is to do a CT scan    10/28: Seen in the SICU. Critically ill. Mechanically ventilated, FiO2 70 %. Receiving 1 unit PRBCs. He does open his eyes to voice. Remains hypervolemic. Urine output is excellent on Bumex drip. Not on any pressors.     10/29: Seen in the SICU. Remains critically ill. Mechanically ventilated via ETT. FiO2 90 %, PEEP 10. He does open his eyes to voice. Excellent urine output on Bumex drip, over 9 liters yesterday. Hypervolemia is improving. Blood pressure is stable.     10/30:  Remains critically ill.  Mechanically ventilated-->FiO2- 80% peep-10.  Continues on Bumex, Fentanyl, Levo and LR.  Bilateral chest tubes.  Going to OR with ortho.     10/31: Seen in the ICU. Critically ill. Mechanically ventilated. FiO2 75 %. He does open is eyes to voice.  Excellent urine output with Bumex drip. Chest tubes removed today.     11/1: Seen in the SICU. Mechanically ventilated and sedated. FiO2 is 70 %. He does open his eyes to voice. Rate of Bumex drip decreased today. Urine output remains excellent. Hypervolemia improving. Tolerating his tube feedings without issues. Brother is present at the bedside.     11/2: Seen in the ICU.  Remains
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
## 2024-11-02 NOTE — PLAN OF CARE
Problem: Pain  Goal: Verbalizes/displays adequate comfort level or baseline comfort level  Outcome: Progressing     Problem: Safety - Adult  Goal: Free from fall injury  Outcome: Progressing     Problem: ABCDS Injury Assessment  Goal: Absence of physical injury  Outcome: Progressing     Problem: Safety - Medical Restraint  Goal: Remains free of injury from restraints (Restraint for Interference with Medical Device)  Description: INTERVENTIONS:  1. Determine that other, less restrictive measures have been tried or would not be effective before applying the restraint  2. Evaluate the patient's condition at the time of restraint application  3. Inform patient/family regarding the reason for restraint  4. Q2H: Monitor safety, psychosocial status, comfort, nutrition and hydration  11/2/2024 0720 by Elicia Nguyễn RN  Outcome: Progressing  11/1/2024 2124 by Sarah Garza RN  Outcome: Not Progressing     Problem: Chronic Conditions and Co-morbidities  Goal: Patient's chronic conditions and co-morbidity symptoms are monitored and maintained or improved  Outcome: Progressing     Problem: Respiratory - Adult  Goal: Achieves optimal ventilation and oxygenation  11/2/2024 0720 by Elicia Nguyễn RN  Outcome: Progressing  11/2/2024 0245 by Emily Aranda RCP  Outcome: Progressing  11/1/2024 2253 by Emily Aranda RCP  Outcome: Progressing     Problem: Gastrointestinal - Adult  Goal: Minimal or absence of nausea and vomiting  11/2/2024 0720 by Elicia Nguyễn RN  Outcome: Progressing  11/1/2024 2124 by Sarah Garza RN  Outcome: Progressing     Problem: Safety - Medical Restraint  Goal: Remains free of injury from restraints (Restraint for Interference with Medical Device)  Description: INTERVENTIONS:  1. Determine that other, less restrictive measures have been tried or would not be effective before applying the restraint  2. Evaluate the patient's condition at the time of restraint  application  3. Inform patient/family regarding the reason for restraint  4. Q2H: Monitor safety, psychosocial status, comfort, nutrition and hydration  11/2/2024 0720 by Elicia Nguyễn RN  Outcome: Progressing  11/1/2024 2124 by Sarah Garza, RN  Outcome: Not Progressing     Problem: Musculoskeletal - Adult  Goal: Return mobility to safest level of function  11/1/2024 2124 by Sarah Garza, RN  Outcome: Not Progressing

## 2024-11-02 NOTE — PLAN OF CARE
Problem: Respiratory - Adult  Goal: Achieves optimal ventilation and oxygenation  11/2/2024 0245 by Emily Aranda RCP  Outcome: Progressing

## 2024-11-02 NOTE — PROGRESS NOTES
11/02/24 1421   Patient Observation   Pulse 98   Respirations 15   SpO2 98 %   Vent Information   Ventilator ID [id number]   Vent Mode AC/PRVC   Ventilator Settings   FiO2  65 %   Insp Time (sec) 0.8 sec   Vt (Set, mL) 500 mL   Resp Rate (Set) 16 bpm   PEEP/CPAP (cmH2O) 10   Vent Patient Data (Readings)   Vt (Measured) 477 mL   Peak Inspiratory Pressure (cmH2O) 23 cmH2O   Rate Measured 16 br/min   Minute Volume (L/min) 8.13 Liters   Mean Airway Pressure (cmH2O) 13 cmH20   Plateau Pressure (cm H2O) 21 cm H2O   Driving Pressure 11   Inspiratory Time 0.8 sec   I:E Ratio 1:3.70   Pressure Sensitivity 3 cm H2O   PEEP Intrinsic (cm H2O) 0.8 cm H2O   I Time/ I Time % 0 s   Backup Apnea On   Backup Rate 16 Breaths Per Minute   Backup Vt 500   Vent Alarm Settings   Low Pressure (cmH2O) 13.5 cmH2O   High Pressure (cmH2O) 45 cmH2O   Low Minute Volume (lpm) 7 L/min   High Minute Volume (lpm) 16 L/min   Low Exhaled Vt (ml) 400 mL   High Exhaled Vt (ml) 900 mL   RR High (bpm) 30 br/min   Apnea (secs) 20 secs   Additional Respiratoray Assessments   Humidification Source Heated wire   Humidification Temp 34.3   Ambu Bag With Mask At Bedside Yes   ETT    Placement Date/Time: 10/30/24 1342   Present on Admission/Arrival: Yes  Placed By: In ED  Comments: patient intubated and on vent from 3806. will remain intubated and ventilated when returned to unit.   Secured At 25 cm   Measured From Lips   ETT Placement Right   Secured By Commercial tube rebolledo   Site Assessment Dry

## 2024-11-02 NOTE — PLAN OF CARE
Problem: Respiratory - Adult  Goal: Achieves optimal ventilation and oxygenation  Outcome: Progressing

## 2024-11-02 NOTE — PROGRESS NOTES
Patient will reach at lines and tubes needing to be redirected much of time. Attempting to provide calm environment and reorientation, but patient still assessed to be a risk of harm to self. Family aware for need of restraints. Will continue to monitor patient and assess for earliest removal capability.

## 2024-11-02 NOTE — FLOWSHEET NOTE
When unrestrained, patient attempts to pull at ETT and other lines necessary for critical care. Patient unable to be redirected at this time. Soft bilateral wrist restraints continued for patient safety.

## 2024-11-02 NOTE — PROGRESS NOTES
11/02/24 1019   Patient Observation   Pulse (!) 102   Respirations 18   SpO2 100 %   Vent Information   Ventilator ID [id number]   Vent Mode AC/PRVC   Ventilator Settings   FiO2  65 %   Insp Time (sec) 0.8 sec   Vt (Set, mL) 500 mL   Resp Rate (Set) 16 bpm   PEEP/CPAP (cmH2O) 10   Vent Patient Data (Readings)   Vt (Measured) 545 mL   Peak Inspiratory Pressure (cmH2O) 24 cmH2O   Rate Measured 16 br/min   Minute Volume (L/min) 8.45 Liters   Mean Airway Pressure (cmH2O) 14 cmH20   Plateau Pressure (cm H2O) 22 cm H2O   Driving Pressure 12   Inspiratory Time 0.8 sec   I:E Ratio 1:3.70   Pressure Sensitivity 3 cm H2O   Static Compliance (L/cm H2O) 39   I Time/ I Time % 0.8 s   Backup Apnea On   Backup Rate 16 Breaths Per Minute   Backup Vt 500   Vent Alarm Settings   Low Pressure (cmH2O) 13.5 cmH2O   High Pressure (cmH2O) 45 cmH2O   Low Minute Volume (lpm) 7 L/min   High Minute Volume (lpm) 16 L/min   Low Exhaled Vt (ml) 400 mL   High Exhaled Vt (ml) 900 mL   RR High (bpm) 30 br/min   Apnea (secs) 20 secs   Additional Respiratoray Assessments   Humidification Source Heated wire   Humidification Temp 37.5   Circuit Condensation Drained   Ambu Bag With Mask At Bedside Yes   ETT    Placement Date/Time: 10/30/24 1342   Present on Admission/Arrival: Yes  Placed By: In ED  Comments: patient intubated and on vent from 3806. will remain intubated and ventilated when returned to unit.   Secured At 25 cm   Measured From Lips   ETT Placement Left   Secured By Commercial tube rebolledo   Site Assessment Dry

## 2024-11-02 NOTE — PROGRESS NOTES
Department of Orthopedic Surgery  Resident Progress Note    POD 3. Patient seen and examined. Patient stable overnight. Currently intubated and sedated.     VITALS:  /69   Pulse (!) 107   Temp 99.3 °F (37.4 °C) (Bladder)   Resp 17   Ht 1.854 m (6' 1\")   Wt (!) 162.3 kg (357 lb 12.9 oz)   SpO2 98%   BMI 47.21 kg/m²     General: in no acute distress, somnolent, responds to pain    MUSCULOSKELETAL:   Right lower extremity:  Dressing essentially saturated contained within boarders of dressing  Compartments soft and compressible  Calf is soft and nontender  Unable to assess PF/DF/EHL  +2/4 DP & PT pulses, Brisk Cap refill, Toes warm and perfused  Unable to assess distal sensation to Peroneals, Sural, Saphenous, and tibial nrs    CBC:   Lab Results   Component Value Date/Time    WBC 28.5 11/02/2024 04:44 AM    HGB 8.1 11/02/2024 04:44 AM    HCT 26.7 11/02/2024 04:44 AM     11/02/2024 04:44 AM     PT/INR:    Lab Results   Component Value Date/Time    PROTIME 16.1 10/24/2024 06:17 PM    INR 1.5 10/24/2024 06:17 PM       ASSESSMENT  S/P right posterior column ORIF - 10/30/24  Left periprosthetic posterio wall posterior column fracture  Left greater trochanteric fracture    PLAN      Continue physical therapy and protocol: NWB - B LE  Abx per admitting  Deep venous thrombosis prophylaxis - per SICU, early mobilization  Can take off the dressing at the surgical site seven days after the date of surgery. Can just peel off. After, do daily dressing changes as needed until the drainage from the surgical site ceases -okay to replace Aquacel as needed  PT/OT  Pain Control: IV and PO  Patient will need ORIF of the left pelvis and left tibia plateau   Surgical timing pending    Electronically signed by Michael Coker DO on 11/2/2024 at 7:26 AM

## 2024-11-03 ENCOUNTER — ANESTHESIA (OUTPATIENT)
Dept: OPERATING ROOM | Age: 49
End: 2024-11-03
Payer: MEDICARE

## 2024-11-03 ENCOUNTER — APPOINTMENT (OUTPATIENT)
Dept: GENERAL RADIOLOGY | Age: 49
End: 2024-11-03
Payer: MEDICARE

## 2024-11-03 LAB
AADO2: 308.6 MMHG
ALBUMIN SERPL-MCNC: 2.7 G/DL (ref 3.5–5.2)
ALP SERPL-CCNC: 203 U/L (ref 40–129)
ALT SERPL-CCNC: 116 U/L (ref 0–40)
ANION GAP SERPL CALCULATED.3IONS-SCNC: 8 MMOL/L (ref 7–16)
ANION GAP SERPL CALCULATED.3IONS-SCNC: 8 MMOL/L (ref 7–16)
AST SERPL-CCNC: 81 U/L (ref 0–39)
B.E.: 7.2 MMOL/L (ref -3–3)
BASOPHILS # BLD: 0 K/UL (ref 0–0.2)
BASOPHILS # BLD: 0 K/UL (ref 0–0.2)
BASOPHILS NFR BLD: 0 % (ref 0–2)
BASOPHILS NFR BLD: 0 % (ref 0–2)
BILIRUB SERPL-MCNC: 1.6 MG/DL (ref 0–1.2)
BUN SERPL-MCNC: 48 MG/DL (ref 6–20)
BUN SERPL-MCNC: 51 MG/DL (ref 6–20)
CA-I BLD-SCNC: 1.08 MMOL/L (ref 1.15–1.33)
CA-I BLD-SCNC: 1.09 MMOL/L (ref 1.15–1.33)
CALCIUM SERPL-MCNC: 7.8 MG/DL (ref 8.6–10.2)
CALCIUM SERPL-MCNC: 8.1 MG/DL (ref 8.6–10.2)
CHLORIDE SERPL-SCNC: 110 MMOL/L (ref 98–107)
CHLORIDE SERPL-SCNC: 111 MMOL/L (ref 98–107)
CO2 SERPL-SCNC: 34 MMOL/L (ref 22–29)
CO2 SERPL-SCNC: 35 MMOL/L (ref 22–29)
COHB: 0.9 % (ref 0–1.5)
CREAT SERPL-MCNC: 1 MG/DL (ref 0.7–1.2)
CREAT SERPL-MCNC: 1 MG/DL (ref 0.7–1.2)
CRITICAL: ABNORMAL
DATE ANALYZED: ABNORMAL
DATE OF COLLECTION: ABNORMAL
EOSINOPHIL # BLD: 0.28 K/UL (ref 0.05–0.5)
EOSINOPHIL # BLD: 0.3 K/UL (ref 0.05–0.5)
EOSINOPHILS RELATIVE PERCENT: 1 % (ref 0–6)
EOSINOPHILS RELATIVE PERCENT: 1 % (ref 0–6)
ERYTHROCYTE [DISTWIDTH] IN BLOOD BY AUTOMATED COUNT: 18.4 % (ref 11.5–15)
ERYTHROCYTE [DISTWIDTH] IN BLOOD BY AUTOMATED COUNT: 18.4 % (ref 11.5–15)
FIO2: 60 %
GFR, ESTIMATED: >90 ML/MIN/1.73M2
GFR, ESTIMATED: >90 ML/MIN/1.73M2
GLUCOSE BLD-MCNC: 172 MG/DL (ref 74–99)
GLUCOSE BLD-MCNC: 195 MG/DL (ref 74–99)
GLUCOSE BLD-MCNC: 227 MG/DL (ref 74–99)
GLUCOSE SERPL-MCNC: 206 MG/DL (ref 74–99)
GLUCOSE SERPL-MCNC: 257 MG/DL (ref 74–99)
HCO3: 31 MMOL/L (ref 22–26)
HCT VFR BLD AUTO: 27.2 % (ref 37–54)
HCT VFR BLD AUTO: 29.2 % (ref 37–54)
HGB BLD-MCNC: 8 G/DL (ref 12.5–16.5)
HGB BLD-MCNC: 8.6 G/DL (ref 12.5–16.5)
HHB: 4.3 % (ref 0–5)
LAB: ABNORMAL
LYMPHOCYTES NFR BLD: 0.6 K/UL (ref 1.5–4)
LYMPHOCYTES NFR BLD: 1.66 K/UL (ref 1.5–4)
LYMPHOCYTES RELATIVE PERCENT: 2 % (ref 20–42)
LYMPHOCYTES RELATIVE PERCENT: 5 % (ref 20–42)
Lab: 545
MAGNESIUM SERPL-MCNC: 2.3 MG/DL (ref 1.6–2.6)
MAGNESIUM SERPL-MCNC: 2.5 MG/DL (ref 1.6–2.6)
MCH RBC QN AUTO: 28.7 PG (ref 26–35)
MCH RBC QN AUTO: 29 PG (ref 26–35)
MCHC RBC AUTO-ENTMCNC: 29.4 G/DL (ref 32–34.5)
MCHC RBC AUTO-ENTMCNC: 29.5 G/DL (ref 32–34.5)
MCV RBC AUTO: 97.3 FL (ref 80–99.9)
MCV RBC AUTO: 98.6 FL (ref 80–99.9)
METAMYELOCYTES ABSOLUTE COUNT: 0.3 K/UL (ref 0–0.12)
METAMYELOCYTES: 1 % (ref 0–1)
METHB: 1.6 % (ref 0–1.5)
MODE: AC
MONOCYTES NFR BLD: 0.6 K/UL (ref 0.1–0.95)
MONOCYTES NFR BLD: 2 % (ref 2–12)
MONOCYTES NFR BLD: 2.22 K/UL (ref 0.1–0.95)
MONOCYTES NFR BLD: 7 % (ref 2–12)
NEUTROPHILS NFR BLD: 87 % (ref 43–80)
NEUTROPHILS NFR BLD: 95 % (ref 43–80)
NEUTS SEG NFR BLD: 27.15 K/UL (ref 1.8–7.3)
NEUTS SEG NFR BLD: 32.19 K/UL (ref 1.8–7.3)
NUCLEATED RED BLOOD CELLS: 1 PER 100 WBC
NUCLEATED RED BLOOD CELLS: 3 PER 100 WBC
O2 SATURATION: 95.6 % (ref 92–98.5)
O2HB: 93.2 % (ref 94–97)
OPERATOR ID: 2593
PATIENT TEMP: 37 C
PCO2: 40.8 MMHG (ref 35–45)
PEEP/CPAP: 10 CMH2O
PFO2: 1.24 MMHG/%
PH BLOOD GAS: 7.5 (ref 7.35–7.45)
PHOSPHATE SERPL-MCNC: 2.6 MG/DL (ref 2.5–4.5)
PHOSPHATE SERPL-MCNC: 4.2 MG/DL (ref 2.5–4.5)
PLATELET # BLD AUTO: 469 K/UL (ref 130–450)
PLATELET # BLD AUTO: 491 K/UL (ref 130–450)
PMV BLD AUTO: 10.7 FL (ref 7–12)
PMV BLD AUTO: 10.8 FL (ref 7–12)
PO2: 74.3 MMHG (ref 75–100)
POTASSIUM SERPL-SCNC: 3.7 MMOL/L (ref 3.5–5)
POTASSIUM SERPL-SCNC: 3.9 MMOL/L (ref 3.5–5)
PROT SERPL-MCNC: 5.3 G/DL (ref 6.4–8.3)
RBC # BLD AUTO: 2.76 M/UL (ref 3.8–5.8)
RBC # BLD AUTO: 3 M/UL (ref 3.8–5.8)
RBC # BLD: ABNORMAL 10*6/UL
RI(T): 4.15
RR MECHANICAL: 16 B/MIN
SODIUM SERPL-SCNC: 153 MMOL/L (ref 132–146)
SODIUM SERPL-SCNC: 153 MMOL/L (ref 132–146)
SOURCE, BLOOD GAS: ABNORMAL
THB: 10.3 G/DL (ref 11.5–16.5)
TIME ANALYZED: 553
VT MECHANICAL: 500 ML
WBC OTHER # BLD: 31.3 K/UL (ref 4.5–11.5)
WBC OTHER # BLD: 34 K/UL (ref 4.5–11.5)

## 2024-11-03 PROCEDURE — 2709999900 HC NON-CHARGEABLE SUPPLY: Performed by: ORTHOPAEDIC SURGERY

## 2024-11-03 PROCEDURE — 2720000010 HC SURG SUPPLY STERILE: Performed by: ORTHOPAEDIC SURGERY

## 2024-11-03 PROCEDURE — 94003 VENT MGMT INPAT SUBQ DAY: CPT

## 2024-11-03 PROCEDURE — 6370000000 HC RX 637 (ALT 250 FOR IP)

## 2024-11-03 PROCEDURE — C1713 ANCHOR/SCREW BN/BN,TIS/BN: HCPCS | Performed by: ORTHOPAEDIC SURGERY

## 2024-11-03 PROCEDURE — 99291 CRITICAL CARE FIRST HOUR: CPT | Performed by: SURGERY

## 2024-11-03 PROCEDURE — 2580000003 HC RX 258: Performed by: INTERNAL MEDICINE

## 2024-11-03 PROCEDURE — 82805 BLOOD GASES W/O2 SATURATION: CPT

## 2024-11-03 PROCEDURE — 6360000002 HC RX W HCPCS

## 2024-11-03 PROCEDURE — 2500000003 HC RX 250 WO HCPCS

## 2024-11-03 PROCEDURE — 7100000000 HC PACU RECOVERY - FIRST 15 MIN

## 2024-11-03 PROCEDURE — 37799 UNLISTED PX VASCULAR SURGERY: CPT

## 2024-11-03 PROCEDURE — 71045 X-RAY EXAM CHEST 1 VIEW: CPT

## 2024-11-03 PROCEDURE — 27536 TREAT KNEE FRACTURE: CPT | Performed by: ORTHOPAEDIC SURGERY

## 2024-11-03 PROCEDURE — 83735 ASSAY OF MAGNESIUM: CPT

## 2024-11-03 PROCEDURE — 2000000000 HC ICU R&B

## 2024-11-03 PROCEDURE — 2580000003 HC RX 258: Performed by: SURGERY

## 2024-11-03 PROCEDURE — 2580000003 HC RX 258

## 2024-11-03 PROCEDURE — L3650 SO 8 ABD RESTRAINT PRE OTS: HCPCS | Performed by: ORTHOPAEDIC SURGERY

## 2024-11-03 PROCEDURE — 73562 X-RAY EXAM OF KNEE 3: CPT

## 2024-11-03 PROCEDURE — 6360000002 HC RX W HCPCS: Performed by: ORTHOPAEDIC SURGERY

## 2024-11-03 PROCEDURE — 3700000000 HC ANESTHESIA ATTENDED CARE: Performed by: ORTHOPAEDIC SURGERY

## 2024-11-03 PROCEDURE — 0QSH04Z REPOSITION LEFT TIBIA WITH INTERNAL FIXATION DEVICE, OPEN APPROACH: ICD-10-PCS | Performed by: ORTHOPAEDIC SURGERY

## 2024-11-03 PROCEDURE — 82962 GLUCOSE BLOOD TEST: CPT

## 2024-11-03 PROCEDURE — 3700000001 HC ADD 15 MINUTES (ANESTHESIA): Performed by: ORTHOPAEDIC SURGERY

## 2024-11-03 PROCEDURE — 82330 ASSAY OF CALCIUM: CPT

## 2024-11-03 PROCEDURE — 84100 ASSAY OF PHOSPHORUS: CPT

## 2024-11-03 PROCEDURE — 7100000001 HC PACU RECOVERY - ADDTL 15 MIN

## 2024-11-03 PROCEDURE — 6370000000 HC RX 637 (ALT 250 FOR IP): Performed by: INTERNAL MEDICINE

## 2024-11-03 PROCEDURE — 80053 COMPREHEN METABOLIC PANEL: CPT

## 2024-11-03 PROCEDURE — 3600000015 HC SURGERY LEVEL 5 ADDTL 15MIN: Performed by: ORTHOPAEDIC SURGERY

## 2024-11-03 PROCEDURE — 85025 COMPLETE CBC W/AUTO DIFF WBC: CPT

## 2024-11-03 PROCEDURE — 80048 BASIC METABOLIC PNL TOTAL CA: CPT

## 2024-11-03 PROCEDURE — 3600000005 HC SURGERY LEVEL 5 BASE: Performed by: ORTHOPAEDIC SURGERY

## 2024-11-03 DEVICE — SCREW BNE L70MM DIA3.5MM CORT S STL ST FULL THRD SM HEX: Type: IMPLANTABLE DEVICE | Site: TIBIA | Status: FUNCTIONAL

## 2024-11-03 DEVICE — GRAFT BNE SUB 15ML 1.7-10MM CANC CHIP MORSELIZED FRZ DRY: Type: IMPLANTABLE DEVICE | Site: TIBIA | Status: FUNCTIONAL

## 2024-11-03 DEVICE — SCREW BNE L44MM DIA3.5MM CORT S STL ST NONCANNULATED LOK: Type: IMPLANTABLE DEVICE | Site: TIBIA | Status: FUNCTIONAL

## 2024-11-03 DEVICE — PLATE BNE L102MM 6 H ST L PROX BILAT TIB S STL NEUT LOK: Type: IMPLANTABLE DEVICE | Site: TIBIA | Status: FUNCTIONAL

## 2024-11-03 DEVICE — SCREW BNE L42MM DIA3.5MM CORT S STL ST NONCANNULATED LOK: Type: IMPLANTABLE DEVICE | Site: TIBIA | Status: FUNCTIONAL

## 2024-11-03 DEVICE — SCREW BNE L90MM DIA3.5MM PELV CORT S STL ST THRD SM HEX: Type: IMPLANTABLE DEVICE | Site: TIBIA | Status: FUNCTIONAL

## 2024-11-03 DEVICE — IMPLANTABLE DEVICE: Type: IMPLANTABLE DEVICE | Site: TIBIA | Status: FUNCTIONAL

## 2024-11-03 DEVICE — SCREW BNE L85MM DIA3.5MM CORT S STL ST LOK FULL THRD T15: Type: IMPLANTABLE DEVICE | Site: TIBIA | Status: FUNCTIONAL

## 2024-11-03 DEVICE — SCREW BNE L50MM DIA3.5MM CORT S STL ST NONCANNULATED LOK: Type: IMPLANTABLE DEVICE | Site: TIBIA | Status: FUNCTIONAL

## 2024-11-03 RX ORDER — VANCOMYCIN HYDROCHLORIDE 1 G/20ML
INJECTION, POWDER, LYOPHILIZED, FOR SOLUTION INTRAVENOUS PRN
Status: DISCONTINUED | OUTPATIENT
Start: 2024-11-03 | End: 2024-11-03 | Stop reason: ALTCHOICE

## 2024-11-03 RX ORDER — HYDROMORPHONE HYDROCHLORIDE 2 MG/ML
INJECTION, SOLUTION INTRAMUSCULAR; INTRAVENOUS; SUBCUTANEOUS
Status: DISCONTINUED | OUTPATIENT
Start: 2024-11-03 | End: 2024-11-03 | Stop reason: SDUPTHER

## 2024-11-03 RX ORDER — VECURONIUM BROMIDE 1 MG/ML
INJECTION, POWDER, LYOPHILIZED, FOR SOLUTION INTRAVENOUS
Status: DISCONTINUED | OUTPATIENT
Start: 2024-11-03 | End: 2024-11-03 | Stop reason: SDUPTHER

## 2024-11-03 RX ORDER — DEXTROSE MONOHYDRATE 50 MG/ML
INJECTION, SOLUTION INTRAVENOUS CONTINUOUS
Status: ACTIVE | OUTPATIENT
Start: 2024-11-03 | End: 2024-11-04

## 2024-11-03 RX ORDER — SODIUM CHLORIDE, SODIUM LACTATE, POTASSIUM CHLORIDE, CALCIUM CHLORIDE 600; 310; 30; 20 MG/100ML; MG/100ML; MG/100ML; MG/100ML
INJECTION, SOLUTION INTRAVENOUS
Status: DISCONTINUED | OUTPATIENT
Start: 2024-11-03 | End: 2024-11-03 | Stop reason: SDUPTHER

## 2024-11-03 RX ORDER — CEFAZOLIN SODIUM 1 G/3ML
INJECTION, POWDER, FOR SOLUTION INTRAMUSCULAR; INTRAVENOUS
Status: DISCONTINUED | OUTPATIENT
Start: 2024-11-03 | End: 2024-11-03 | Stop reason: SDUPTHER

## 2024-11-03 RX ORDER — FENTANYL CITRATE 50 UG/ML
INJECTION, SOLUTION INTRAMUSCULAR; INTRAVENOUS
Status: DISCONTINUED | OUTPATIENT
Start: 2024-11-03 | End: 2024-11-03 | Stop reason: SDUPTHER

## 2024-11-03 RX ORDER — LABETALOL HYDROCHLORIDE 5 MG/ML
INJECTION, SOLUTION INTRAVENOUS
Status: DISCONTINUED | OUTPATIENT
Start: 2024-11-03 | End: 2024-11-03 | Stop reason: SDUPTHER

## 2024-11-03 RX ORDER — TOBRAMYCIN 1.2 G/30ML
INJECTION, POWDER, LYOPHILIZED, FOR SOLUTION INTRAVENOUS PRN
Status: DISCONTINUED | OUTPATIENT
Start: 2024-11-03 | End: 2024-11-03 | Stop reason: ALTCHOICE

## 2024-11-03 RX ORDER — PROPOFOL 10 MG/ML
INJECTION, EMULSION INTRAVENOUS
Status: DISCONTINUED | OUTPATIENT
Start: 2024-11-03 | End: 2024-11-03 | Stop reason: SDUPTHER

## 2024-11-03 RX ORDER — VASOPRESSIN 20 U/ML
INJECTION PARENTERAL
Status: DISCONTINUED | OUTPATIENT
Start: 2024-11-03 | End: 2024-11-03 | Stop reason: SDUPTHER

## 2024-11-03 RX ORDER — INSULIN LISPRO 100 [IU]/ML
0-16 INJECTION, SOLUTION INTRAVENOUS; SUBCUTANEOUS
Status: DISCONTINUED | OUTPATIENT
Start: 2024-11-03 | End: 2024-11-04

## 2024-11-03 RX ORDER — INSULIN GLARGINE 100 [IU]/ML
10 INJECTION, SOLUTION SUBCUTANEOUS NIGHTLY
Status: DISCONTINUED | OUTPATIENT
Start: 2024-11-03 | End: 2024-11-04

## 2024-11-03 RX ADMIN — FENTANYL CITRATE 50 MCG: 0.05 INJECTION, SOLUTION INTRAMUSCULAR; INTRAVENOUS at 13:10

## 2024-11-03 RX ADMIN — SODIUM CHLORIDE, PRESERVATIVE FREE 10 ML: 5 INJECTION INTRAVENOUS at 08:16

## 2024-11-03 RX ADMIN — Medication 200 MCG/HR: at 11:41

## 2024-11-03 RX ADMIN — LABETALOL HYDROCHLORIDE 5 MG: 5 INJECTION INTRAVENOUS at 13:30

## 2024-11-03 RX ADMIN — FENTANYL CITRATE 50 MCG: 0.05 INJECTION, SOLUTION INTRAMUSCULAR; INTRAVENOUS at 13:47

## 2024-11-03 RX ADMIN — SODIUM CHLORIDE, PRESERVATIVE FREE 10 ML: 5 INJECTION INTRAVENOUS at 19:39

## 2024-11-03 RX ADMIN — VECURONIUM BROMIDE 2 MG: 1 INJECTION, POWDER, LYOPHILIZED, FOR SOLUTION INTRAVENOUS at 13:02

## 2024-11-03 RX ADMIN — VASOPRESSIN 1 UNITS: 20 INJECTION INTRAVENOUS at 14:11

## 2024-11-03 RX ADMIN — INSULIN LISPRO 8 UNITS: 100 INJECTION, SOLUTION INTRAVENOUS; SUBCUTANEOUS at 19:33

## 2024-11-03 RX ADMIN — CEFAZOLIN 3 G: 1 INJECTION, POWDER, FOR SOLUTION INTRAMUSCULAR; INTRAVENOUS at 12:30

## 2024-11-03 RX ADMIN — SODIUM CHLORIDE, PRESERVATIVE FREE 10 ML: 5 INJECTION INTRAVENOUS at 08:15

## 2024-11-03 RX ADMIN — ACETAMINOPHEN 650 MG: 650 SOLUTION ORAL at 23:54

## 2024-11-03 RX ADMIN — PROPOFOL 40 MCG/KG/MIN: 10 INJECTION, EMULSION INTRAVENOUS at 23:49

## 2024-11-03 RX ADMIN — CHLORHEXIDINE GLUCONATE, 0.12% ORAL RINSE 15 ML: 1.2 SOLUTION DENTAL at 19:31

## 2024-11-03 RX ADMIN — PROPOFOL 45 MCG/KG/MIN: 10 INJECTION, EMULSION INTRAVENOUS at 15:22

## 2024-11-03 RX ADMIN — VECURONIUM BROMIDE 7 MG: 1 INJECTION, POWDER, LYOPHILIZED, FOR SOLUTION INTRAVENOUS at 12:18

## 2024-11-03 RX ADMIN — PROPOFOL 50 MG: 10 INJECTION, EMULSION INTRAVENOUS at 12:15

## 2024-11-03 RX ADMIN — BACITRACIN ZINC: 500 OINTMENT TOPICAL at 08:15

## 2024-11-03 RX ADMIN — OXYCODONE HYDROCHLORIDE 10 MG: 5 SOLUTION ORAL at 06:06

## 2024-11-03 RX ADMIN — HYDROCORTISONE SODIUM SUCCINATE 50 MG: 100 INJECTION, POWDER, FOR SOLUTION INTRAMUSCULAR; INTRAVENOUS at 18:26

## 2024-11-03 RX ADMIN — HYDROCORTISONE SODIUM SUCCINATE 50 MG: 100 INJECTION, POWDER, FOR SOLUTION INTRAMUSCULAR; INTRAVENOUS at 05:43

## 2024-11-03 RX ADMIN — VASOPRESSIN 1 UNITS: 20 INJECTION INTRAVENOUS at 14:21

## 2024-11-03 RX ADMIN — Medication 200 MCG/HR: at 00:46

## 2024-11-03 RX ADMIN — PROPOFOL 50 MCG/KG/MIN: 10 INJECTION, EMULSION INTRAVENOUS at 01:26

## 2024-11-03 RX ADMIN — VECURONIUM BROMIDE 3 MG: 1 INJECTION, POWDER, LYOPHILIZED, FOR SOLUTION INTRAVENOUS at 12:04

## 2024-11-03 RX ADMIN — PROPOFOL 50 MG: 10 INJECTION, EMULSION INTRAVENOUS at 12:04

## 2024-11-03 RX ADMIN — CALCIUM GLUCONATE 2000 MG: 98 INJECTION, SOLUTION INTRAVENOUS at 10:55

## 2024-11-03 RX ADMIN — PROPOFOL 50 MCG/KG/MIN: 10 INJECTION, EMULSION INTRAVENOUS at 05:35

## 2024-11-03 RX ADMIN — HEPARIN SODIUM 7500 UNITS: 5000 INJECTION INTRAVENOUS; SUBCUTANEOUS at 05:42

## 2024-11-03 RX ADMIN — HYDROMORPHONE HYDROCHLORIDE 1 MG: 2 INJECTION, SOLUTION INTRAMUSCULAR; INTRAVENOUS; SUBCUTANEOUS at 14:14

## 2024-11-03 RX ADMIN — CEFEPIME 2000 MG: 2 INJECTION, POWDER, FOR SOLUTION INTRAVENOUS at 01:27

## 2024-11-03 RX ADMIN — MINERAL OIL, WHITE PETROLATUM: .03; .94 OINTMENT OPHTHALMIC at 23:51

## 2024-11-03 RX ADMIN — DEXTROSE MONOHYDRATE: 50 INJECTION, SOLUTION INTRAVENOUS at 16:46

## 2024-11-03 RX ADMIN — OXYCODONE HYDROCHLORIDE 10 MG: 5 SOLUTION ORAL at 18:27

## 2024-11-03 RX ADMIN — MINERAL OIL, WHITE PETROLATUM: .03; .94 OINTMENT OPHTHALMIC at 08:15

## 2024-11-03 RX ADMIN — FENTANYL CITRATE 50 MCG: 0.05 INJECTION, SOLUTION INTRAMUSCULAR; INTRAVENOUS at 12:46

## 2024-11-03 RX ADMIN — CHLORHEXIDINE GLUCONATE, 0.12% ORAL RINSE 15 ML: 1.2 SOLUTION DENTAL at 08:30

## 2024-11-03 RX ADMIN — PROPOFOL 50 MCG/KG/MIN: 10 INJECTION, EMULSION INTRAVENOUS at 02:43

## 2024-11-03 RX ADMIN — WATER 2000 MG: 1 INJECTION INTRAMUSCULAR; INTRAVENOUS; SUBCUTANEOUS at 21:40

## 2024-11-03 RX ADMIN — MINERAL OIL, WHITE PETROLATUM: .03; .94 OINTMENT OPHTHALMIC at 15:57

## 2024-11-03 RX ADMIN — LABETALOL HYDROCHLORIDE 5 MG: 5 INJECTION INTRAVENOUS at 12:32

## 2024-11-03 RX ADMIN — SODIUM CHLORIDE, PRESERVATIVE FREE 40 MG: 5 INJECTION INTRAVENOUS at 19:36

## 2024-11-03 RX ADMIN — PROPOFOL 50 MCG/KG/MIN: 10 INJECTION, EMULSION INTRAVENOUS at 10:16

## 2024-11-03 RX ADMIN — FENTANYL CITRATE 50 MCG: 0.05 INJECTION, SOLUTION INTRAMUSCULAR; INTRAVENOUS at 12:30

## 2024-11-03 RX ADMIN — SODIUM CHLORIDE, PRESERVATIVE FREE 40 MG: 5 INJECTION INTRAVENOUS at 08:23

## 2024-11-03 RX ADMIN — PROPOFOL 40 MCG/KG/MIN: 10 INJECTION, EMULSION INTRAVENOUS at 18:31

## 2024-11-03 RX ADMIN — VECURONIUM BROMIDE 3 MG: 1 INJECTION, POWDER, LYOPHILIZED, FOR SOLUTION INTRAVENOUS at 13:21

## 2024-11-03 RX ADMIN — POLYVINYL ALCOHOL, POVIDONE 1 DROP: 14; 6 SOLUTION/ DROPS OPHTHALMIC at 02:43

## 2024-11-03 RX ADMIN — OXYCODONE HYDROCHLORIDE 10 MG: 5 SOLUTION ORAL at 00:53

## 2024-11-03 RX ADMIN — HYDROCORTISONE SODIUM SUCCINATE 50 MG: 100 INJECTION, POWDER, FOR SOLUTION INTRAMUSCULAR; INTRAVENOUS at 23:51

## 2024-11-03 RX ADMIN — SODIUM CHLORIDE, POTASSIUM CHLORIDE, SODIUM LACTATE AND CALCIUM CHLORIDE: 600; 310; 30; 20 INJECTION, SOLUTION INTRAVENOUS at 12:02

## 2024-11-03 RX ADMIN — VASOPRESSIN 1 UNITS: 20 INJECTION INTRAVENOUS at 12:52

## 2024-11-03 RX ADMIN — OXYCODONE HYDROCHLORIDE 10 MG: 5 SOLUTION ORAL at 23:53

## 2024-11-03 RX ADMIN — INSULIN GLARGINE 10 UNITS: 100 INJECTION, SOLUTION SUBCUTANEOUS at 20:00

## 2024-11-03 RX ADMIN — HYDROMORPHONE HYDROCHLORIDE 1 MG: 2 INJECTION, SOLUTION INTRAMUSCULAR; INTRAVENOUS; SUBCUTANEOUS at 14:03

## 2024-11-03 RX ADMIN — PROPOFOL 40 MCG/KG/MIN: 10 INJECTION, EMULSION INTRAVENOUS at 21:22

## 2024-11-03 RX ADMIN — GABAPENTIN 300 MG: 300 CAPSULE ORAL at 20:30

## 2024-11-03 RX ADMIN — CEFEPIME 2000 MG: 2 INJECTION, POWDER, FOR SOLUTION INTRAVENOUS at 23:51

## 2024-11-03 RX ADMIN — PROPOFOL 50 MCG/KG/MIN: 10 INJECTION, EMULSION INTRAVENOUS at 07:40

## 2024-11-03 RX ADMIN — POTASSIUM BICARBONATE 40 MEQ: 782 TABLET, EFFERVESCENT ORAL at 19:38

## 2024-11-03 RX ADMIN — MINERAL OIL, WHITE PETROLATUM: .03; .94 OINTMENT OPHTHALMIC at 19:32

## 2024-11-03 RX ADMIN — INSULIN LISPRO 2 UNITS: 100 INJECTION, SOLUTION INTRAVENOUS; SUBCUTANEOUS at 05:51

## 2024-11-03 RX ADMIN — VASOPRESSIN 1 UNITS: 20 INJECTION INTRAVENOUS at 12:35

## 2024-11-03 RX ADMIN — Medication 200 MCG/HR: at 19:37

## 2024-11-03 RX ADMIN — VECURONIUM BROMIDE 2 MG: 1 INJECTION, POWDER, LYOPHILIZED, FOR SOLUTION INTRAVENOUS at 14:10

## 2024-11-03 RX ADMIN — CEFEPIME 2000 MG: 2 INJECTION, POWDER, FOR SOLUTION INTRAVENOUS at 15:56

## 2024-11-03 RX ADMIN — POTASSIUM BICARBONATE 40 MEQ: 782 TABLET, EFFERVESCENT ORAL at 08:23

## 2024-11-03 RX ADMIN — MINERAL OIL, WHITE PETROLATUM: .03; .94 OINTMENT OPHTHALMIC at 10:55

## 2024-11-03 RX ADMIN — FENTANYL CITRATE 50 MCG: 0.05 INJECTION, SOLUTION INTRAMUSCULAR; INTRAVENOUS at 12:04

## 2024-11-03 RX ADMIN — HEPARIN SODIUM 7500 UNITS: 5000 INJECTION INTRAVENOUS; SUBCUTANEOUS at 21:40

## 2024-11-03 RX ADMIN — Medication 200 MCG/HR: at 05:46

## 2024-11-03 RX ADMIN — INSULIN LISPRO 4 UNITS: 100 INJECTION, SOLUTION INTRAVENOUS; SUBCUTANEOUS at 15:57

## 2024-11-03 RX ADMIN — LABETALOL HYDROCHLORIDE 5 MG: 5 INJECTION INTRAVENOUS at 13:12

## 2024-11-03 RX ADMIN — GABAPENTIN 300 MG: 300 CAPSULE ORAL at 08:23

## 2024-11-03 ASSESSMENT — PULMONARY FUNCTION TESTS
PIF_VALUE: 20
PIF_VALUE: 21
PIF_VALUE: 22
PIF_VALUE: 17
PIF_VALUE: 24
PIF_VALUE: 27
PIF_VALUE: 26
PIF_VALUE: 26
PIF_VALUE: 23
PIF_VALUE: 19
PIF_VALUE: 23
PIF_VALUE: 16
PIF_VALUE: 41
PIF_VALUE: 21
PIF_VALUE: 16
PIF_VALUE: 21
PIF_VALUE: 24
PIF_VALUE: 28
PIF_VALUE: 24
PIF_VALUE: 24
PIF_VALUE: 19
PIF_VALUE: 22
PIF_VALUE: 18
PIF_VALUE: 19
PIF_VALUE: 23
PIF_VALUE: 23
PIF_VALUE: 21
PIF_VALUE: 21
PIF_VALUE: 22

## 2024-11-03 ASSESSMENT — PAIN SCALES - GENERAL
PAINLEVEL_OUTOF10: 0
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
## 2024-11-03 NOTE — FLOWSHEET NOTE
Patient to OR with OR RN's and Anesthesia. 2 point bilateral wrist restraints discontinued at this time

## 2024-11-03 NOTE — PLAN OF CARE
Problem: Pain  Goal: Verbalizes/displays adequate comfort level or baseline comfort level  Outcome: Progressing     Problem: Safety - Adult  Goal: Free from fall injury  Outcome: Progressing     Problem: Safety - Medical Restraint  Goal: Remains free of injury from restraints (Restraint for Interference with Medical Device)  Description: INTERVENTIONS:  1. Determine that other, less restrictive measures have been tried or would not be effective before applying the restraint  2. Evaluate the patient's condition at the time of restraint application  3. Inform patient/family regarding the reason for restraint  4. Q2H: Monitor safety, psychosocial status, comfort, nutrition and hydration  Outcome: Progressing     Problem: Neurosensory - Adult  Goal: Achieves stable or improved neurological status  Outcome: Progressing     Problem: Respiratory - Adult  Goal: Achieves optimal ventilation and oxygenation  Outcome: Progressing

## 2024-11-03 NOTE — PROGRESS NOTES
[facility]  MD Laron Muller MD Ali Hassan, MD Lisa Kniska, CNP   Lo Elmore, BLAIRE Davidson, CNP  Progress note   Patient's Name: Glenroy Simmons II  1:53 PM  11/3/2024        10/26 : seen in his room intubated mechanically ventilated fio2 90 peep 10 massively hyperovlemic , UO ok . No pressors .     10/27 seen in his room remain critically ill , mechanically ventilated no pressors .   We did try SLED yesterday and we could not achieve a good ultrafiltration due to lower blood pressure ,we did start him on a Bumex drip today and has been having excellent urine output in the range of 2  an hour per ICU nursing his azotemia is actually slightly better .  His oxygen requirement are higher and better ICU the plan is to do a CT scan    10/28: Seen in the SICU. Critically ill. Mechanically ventilated, FiO2 70 %. Receiving 1 unit PRBCs. He does open his eyes to voice. Remains hypervolemic. Urine output is excellent on Bumex drip. Not on any pressors.     10/29: Seen in the SICU. Remains critically ill. Mechanically ventilated via ETT. FiO2 90 %, PEEP 10. He does open his eyes to voice. Excellent urine output on Bumex drip, over 9 liters yesterday. Hypervolemia is improving. Blood pressure is stable.     10/30:  Remains critically ill.  Mechanically ventilated-->FiO2- 80% peep-10.  Continues on Bumex, Fentanyl, Levo and LR.  Bilateral chest tubes.  Going to OR with ortho.     10/31: Seen in the ICU. Critically ill. Mechanically ventilated. FiO2 75 %. He does open is eyes to voice.  Excellent urine output with Bumex drip. Chest tubes removed today.     11/1: Seen in the SICU. Mechanically ventilated and sedated. FiO2 is 70 %. He does open his eyes to voice. Rate of Bumex drip decreased today. Urine output remains excellent. Hypervolemia improving. Tolerating his tube feedings without issues. Brother is present at the bedside.     11/2: Seen in the ICU.  Remains  own  Continue intensive supportive care  Follow CK level   Continue nutrition support per ICU team  Follow labs, UO  Dose all Abx by pharmacy dosing    Recheck BMP after OR, consider adjusting free water flushes versus starting conservative-rate D5 water drip  If able, resume free water flushes after or      Electronically signed by Laron Varghese MD on 11/3/2024 at 1:53 PM

## 2024-11-03 NOTE — FLOWSHEET NOTE
Patient will reach at lines and tubes needing to be redirected much of time. Attempting to provide calm environment and reorientation, but patient still assessed to be a risk of harm to self. Family aware for need of restraints.

## 2024-11-04 ENCOUNTER — APPOINTMENT (OUTPATIENT)
Dept: GENERAL RADIOLOGY | Age: 49
End: 2024-11-04
Payer: MEDICARE

## 2024-11-04 LAB
AADO2: 238.3 MMHG
ABO/RH: NORMAL
ALBUMIN SERPL-MCNC: 2.1 G/DL (ref 3.5–5.2)
ALBUMIN SERPL-MCNC: 2.3 G/DL (ref 3.5–5.2)
ALP SERPL-CCNC: 239 U/L (ref 40–129)
ALP SERPL-CCNC: 251 U/L (ref 40–129)
ALT SERPL-CCNC: 72 U/L (ref 0–40)
ALT SERPL-CCNC: 96 U/L (ref 0–40)
ANION GAP SERPL CALCULATED.3IONS-SCNC: 10 MMOL/L (ref 7–16)
ANION GAP SERPL CALCULATED.3IONS-SCNC: 7 MMOL/L (ref 7–16)
ANTIBODY SCREEN: NEGATIVE
ARM BAND NUMBER: NORMAL
AST SERPL-CCNC: 65 U/L (ref 0–39)
AST SERPL-CCNC: 80 U/L (ref 0–39)
B.E.: 8.8 MMOL/L (ref -3–3)
BASOPHILS # BLD: 0 K/UL (ref 0–0.2)
BASOPHILS # BLD: 0.25 K/UL (ref 0–0.2)
BASOPHILS NFR BLD: 0 % (ref 0–2)
BASOPHILS NFR BLD: 1 % (ref 0–2)
BILIRUB SERPL-MCNC: 1.1 MG/DL (ref 0–1.2)
BILIRUB SERPL-MCNC: 1.2 MG/DL (ref 0–1.2)
BLOOD BANK BLOOD PRODUCT EXPIRATION DATE: NORMAL
BLOOD BANK DISPENSE STATUS: NORMAL
BLOOD BANK ISBT PRODUCT BLOOD TYPE: 9500
BLOOD BANK PRODUCT CODE: NORMAL
BLOOD BANK SAMPLE EXPIRATION: NORMAL
BLOOD BANK UNIT TYPE AND RH: NORMAL
BPU ID: NORMAL
BUN SERPL-MCNC: 45 MG/DL (ref 6–20)
BUN SERPL-MCNC: 50 MG/DL (ref 6–20)
CA-I BLD-SCNC: 1.09 MMOL/L (ref 1.15–1.33)
CA-I BLD-SCNC: 1.12 MMOL/L (ref 1.15–1.33)
CALCIUM SERPL-MCNC: 7.6 MG/DL (ref 8.6–10.2)
CALCIUM SERPL-MCNC: 8 MG/DL (ref 8.6–10.2)
CHLORIDE SERPL-SCNC: 112 MMOL/L (ref 98–107)
CHLORIDE SERPL-SCNC: 114 MMOL/L (ref 98–107)
CO2 SERPL-SCNC: 32 MMOL/L (ref 22–29)
CO2 SERPL-SCNC: 33 MMOL/L (ref 22–29)
COHB: 0.1 % (ref 0–1.5)
COMPONENT: NORMAL
CREAT SERPL-MCNC: 1 MG/DL (ref 0.7–1.2)
CREAT SERPL-MCNC: 1.1 MG/DL (ref 0.7–1.2)
CRITICAL: ABNORMAL
CROSSMATCH RESULT: NORMAL
DATE ANALYZED: ABNORMAL
DATE OF COLLECTION: ABNORMAL
EOSINOPHIL # BLD: 0 K/UL (ref 0.05–0.5)
EOSINOPHIL # BLD: 0.25 K/UL (ref 0.05–0.5)
EOSINOPHILS RELATIVE PERCENT: 0 % (ref 0–6)
EOSINOPHILS RELATIVE PERCENT: 1 % (ref 0–6)
ERYTHROCYTE [DISTWIDTH] IN BLOOD BY AUTOMATED COUNT: 18.2 % (ref 11.5–15)
ERYTHROCYTE [DISTWIDTH] IN BLOOD BY AUTOMATED COUNT: 18.5 % (ref 11.5–15)
FIO2: 50 %
GFR, ESTIMATED: 86 ML/MIN/1.73M2
GFR, ESTIMATED: 87 ML/MIN/1.73M2
GLUCOSE BLD-MCNC: 237 MG/DL (ref 74–99)
GLUCOSE BLD-MCNC: 272 MG/DL (ref 74–99)
GLUCOSE BLD-MCNC: 285 MG/DL (ref 74–99)
GLUCOSE BLD-MCNC: 287 MG/DL (ref 74–99)
GLUCOSE BLD-MCNC: 294 MG/DL (ref 74–99)
GLUCOSE BLD-MCNC: 302 MG/DL (ref 74–99)
GLUCOSE SERPL-MCNC: 318 MG/DL (ref 74–99)
GLUCOSE SERPL-MCNC: 322 MG/DL (ref 74–99)
HCO3: 32.2 MMOL/L (ref 22–26)
HCT VFR BLD AUTO: 24.4 % (ref 37–54)
HCT VFR BLD AUTO: 26.3 % (ref 37–54)
HGB BLD-MCNC: 7 G/DL (ref 12.5–16.5)
HGB BLD-MCNC: 8 G/DL (ref 12.5–16.5)
HHB: 6 % (ref 0–5)
LAB: ABNORMAL
LYMPHOCYTES NFR BLD: 0.94 K/UL (ref 1.5–4)
LYMPHOCYTES NFR BLD: 1.27 K/UL (ref 1.5–4)
LYMPHOCYTES RELATIVE PERCENT: 3 % (ref 20–42)
LYMPHOCYTES RELATIVE PERCENT: 4 % (ref 20–42)
Lab: 455
MAGNESIUM SERPL-MCNC: 2.4 MG/DL (ref 1.6–2.6)
MAGNESIUM SERPL-MCNC: 2.5 MG/DL (ref 1.6–2.6)
MCH RBC QN AUTO: 28.2 PG (ref 26–35)
MCH RBC QN AUTO: 29.6 PG (ref 26–35)
MCHC RBC AUTO-ENTMCNC: 28.7 G/DL (ref 32–34.5)
MCHC RBC AUTO-ENTMCNC: 30.4 G/DL (ref 32–34.5)
MCV RBC AUTO: 97.4 FL (ref 80–99.9)
MCV RBC AUTO: 98.4 FL (ref 80–99.9)
METAMYELOCYTES ABSOLUTE COUNT: 0.25 K/UL (ref 0–0.12)
METAMYELOCYTES: 1 % (ref 0–1)
METHB: 0 % (ref 0–1.5)
MODE: AC
MONOCYTES NFR BLD: 0 % (ref 2–12)
MONOCYTES NFR BLD: 0 K/UL (ref 0.1–0.95)
MONOCYTES NFR BLD: 0.23 K/UL (ref 0.1–0.95)
MONOCYTES NFR BLD: 1 % (ref 2–12)
NEUTROPHILS NFR BLD: 93 % (ref 43–80)
NEUTROPHILS NFR BLD: 96 % (ref 43–80)
NEUTS SEG NFR BLD: 26.23 K/UL (ref 1.8–7.3)
NEUTS SEG NFR BLD: 26.76 K/UL (ref 1.8–7.3)
NUCLEATED RED BLOOD CELLS: 1 PER 100 WBC
NUCLEATED RED BLOOD CELLS: 1 PER 100 WBC
O2 SATURATION: 94 % (ref 92–98.5)
O2HB: 93.9 % (ref 94–97)
OPERATOR ID: 2593
PATIENT TEMP: 37 C
PCO2: 39.4 MMHG (ref 35–45)
PEEP/CPAP: 10 CMH2O
PFO2: 1.48 MMHG/%
PH BLOOD GAS: 7.53 (ref 7.35–7.45)
PHOSPHATE SERPL-MCNC: 2.2 MG/DL (ref 2.5–4.5)
PHOSPHATE SERPL-MCNC: 2.5 MG/DL (ref 2.5–4.5)
PLATELET # BLD AUTO: 484 K/UL (ref 130–450)
PLATELET # BLD AUTO: 520 K/UL (ref 130–450)
PMV BLD AUTO: 11 FL (ref 7–12)
PMV BLD AUTO: 11.2 FL (ref 7–12)
PO2: 73.9 MMHG (ref 75–100)
POTASSIUM SERPL-SCNC: 3.4 MMOL/L (ref 3.5–5)
POTASSIUM SERPL-SCNC: 3.7 MMOL/L (ref 3.5–5)
PROT SERPL-MCNC: 4.8 G/DL (ref 6.4–8.3)
PROT SERPL-MCNC: 5.2 G/DL (ref 6.4–8.3)
RBC # BLD AUTO: 2.48 M/UL (ref 3.8–5.8)
RBC # BLD AUTO: 2.7 M/UL (ref 3.8–5.8)
RBC # BLD: ABNORMAL 10*6/UL
RI(T): 3.22
RR MECHANICAL: 16 B/MIN
SODIUM SERPL-SCNC: 151 MMOL/L (ref 132–146)
SODIUM SERPL-SCNC: 157 MMOL/L (ref 132–146)
SOURCE, BLOOD GAS: ABNORMAL
THB: 8.5 G/DL (ref 11.5–16.5)
TIME ANALYZED: 459
TRANSFUSION STATUS: NORMAL
TRIGL SERPL-MCNC: 275 MG/DL
UNIT DIVISION: 0
UNIT ISSUE DATE/TIME: NORMAL
VT MECHANICAL: 500 ML
WBC # BLD: ABNORMAL 10*3/UL
WBC OTHER # BLD: 27.4 K/UL (ref 4.5–11.5)
WBC OTHER # BLD: 28.8 K/UL (ref 4.5–11.5)

## 2024-11-04 PROCEDURE — 99291 CRITICAL CARE FIRST HOUR: CPT | Performed by: STUDENT IN AN ORGANIZED HEALTH CARE EDUCATION/TRAINING PROGRAM

## 2024-11-04 PROCEDURE — 2580000003 HC RX 258: Performed by: SURGERY

## 2024-11-04 PROCEDURE — 6370000000 HC RX 637 (ALT 250 FOR IP)

## 2024-11-04 PROCEDURE — 84100 ASSAY OF PHOSPHORUS: CPT

## 2024-11-04 PROCEDURE — 94003 VENT MGMT INPAT SUBQ DAY: CPT

## 2024-11-04 PROCEDURE — 6370000000 HC RX 637 (ALT 250 FOR IP): Performed by: INTERNAL MEDICINE

## 2024-11-04 PROCEDURE — 6360000002 HC RX W HCPCS

## 2024-11-04 PROCEDURE — 2580000003 HC RX 258: Performed by: INTERNAL MEDICINE

## 2024-11-04 PROCEDURE — 80053 COMPREHEN METABOLIC PANEL: CPT

## 2024-11-04 PROCEDURE — 2580000003 HC RX 258

## 2024-11-04 PROCEDURE — 86901 BLOOD TYPING SEROLOGIC RH(D): CPT

## 2024-11-04 PROCEDURE — 2500000003 HC RX 250 WO HCPCS

## 2024-11-04 PROCEDURE — 85025 COMPLETE CBC W/AUTO DIFF WBC: CPT

## 2024-11-04 PROCEDURE — 82962 GLUCOSE BLOOD TEST: CPT

## 2024-11-04 PROCEDURE — 37799 UNLISTED PX VASCULAR SURGERY: CPT

## 2024-11-04 PROCEDURE — 82805 BLOOD GASES W/O2 SATURATION: CPT

## 2024-11-04 PROCEDURE — 36430 TRANSFUSION BLD/BLD COMPNT: CPT

## 2024-11-04 PROCEDURE — 86923 COMPATIBILITY TEST ELECTRIC: CPT

## 2024-11-04 PROCEDURE — 71045 X-RAY EXAM CHEST 1 VIEW: CPT

## 2024-11-04 PROCEDURE — 82330 ASSAY OF CALCIUM: CPT

## 2024-11-04 PROCEDURE — 86850 RBC ANTIBODY SCREEN: CPT

## 2024-11-04 PROCEDURE — 93005 ELECTROCARDIOGRAM TRACING: CPT

## 2024-11-04 PROCEDURE — 86900 BLOOD TYPING SEROLOGIC ABO: CPT

## 2024-11-04 PROCEDURE — 84478 ASSAY OF TRIGLYCERIDES: CPT

## 2024-11-04 PROCEDURE — P9016 RBC LEUKOCYTES REDUCED: HCPCS

## 2024-11-04 PROCEDURE — 2000000000 HC ICU R&B

## 2024-11-04 PROCEDURE — 83735 ASSAY OF MAGNESIUM: CPT

## 2024-11-04 RX ORDER — INSULIN GLARGINE 100 [IU]/ML
20 INJECTION, SOLUTION SUBCUTANEOUS NIGHTLY
Status: DISCONTINUED | OUTPATIENT
Start: 2024-11-04 | End: 2024-11-05

## 2024-11-04 RX ORDER — SODIUM CHLORIDE, SODIUM LACTATE, POTASSIUM CHLORIDE, AND CALCIUM CHLORIDE .6; .31; .03; .02 G/100ML; G/100ML; G/100ML; G/100ML
1000 INJECTION, SOLUTION INTRAVENOUS ONCE
Status: COMPLETED | OUTPATIENT
Start: 2024-11-04 | End: 2024-11-04

## 2024-11-04 RX ORDER — POTASSIUM CHLORIDE 1500 MG/1
40 TABLET, EXTENDED RELEASE ORAL EVERY 12 HOURS
Status: DISPENSED | OUTPATIENT
Start: 2024-11-04 | End: 2024-11-06

## 2024-11-04 RX ORDER — INSULIN LISPRO 100 [IU]/ML
0-16 INJECTION, SOLUTION INTRAVENOUS; SUBCUTANEOUS EVERY 4 HOURS
Status: DISCONTINUED | OUTPATIENT
Start: 2024-11-04 | End: 2024-11-14 | Stop reason: HOSPADM

## 2024-11-04 RX ORDER — ACETAMINOPHEN 160 MG/5ML
650 LIQUID ORAL EVERY 6 HOURS
Status: DISCONTINUED | OUTPATIENT
Start: 2024-11-04 | End: 2024-11-13

## 2024-11-04 RX ORDER — SODIUM CHLORIDE, SODIUM LACTATE, POTASSIUM CHLORIDE, AND CALCIUM CHLORIDE .6; .31; .03; .02 G/100ML; G/100ML; G/100ML; G/100ML
500 INJECTION, SOLUTION INTRAVENOUS ONCE
Status: COMPLETED | OUTPATIENT
Start: 2024-11-04 | End: 2024-11-04

## 2024-11-04 RX ORDER — SODIUM CHLORIDE 9 MG/ML
INJECTION, SOLUTION INTRAVENOUS PRN
Status: DISCONTINUED | OUTPATIENT
Start: 2024-11-04 | End: 2024-11-08

## 2024-11-04 RX ORDER — METHOCARBAMOL 500 MG/1
1000 TABLET, FILM COATED ORAL 4 TIMES DAILY
Status: DISCONTINUED | OUTPATIENT
Start: 2024-11-04 | End: 2024-11-08

## 2024-11-04 RX ADMIN — PROPOFOL 25 MCG/KG/MIN: 10 INJECTION, EMULSION INTRAVENOUS at 23:10

## 2024-11-04 RX ADMIN — INSULIN LISPRO 8 UNITS: 100 INJECTION, SOLUTION INTRAVENOUS; SUBCUTANEOUS at 12:28

## 2024-11-04 RX ADMIN — Medication 200 MCG/HR: at 01:12

## 2024-11-04 RX ADMIN — MINERAL OIL, WHITE PETROLATUM: .03; .94 OINTMENT OPHTHALMIC at 23:38

## 2024-11-04 RX ADMIN — Medication 200 MCG/HR: at 06:43

## 2024-11-04 RX ADMIN — HYDROCORTISONE SODIUM SUCCINATE 50 MG: 100 INJECTION, POWDER, FOR SOLUTION INTRAMUSCULAR; INTRAVENOUS at 23:29

## 2024-11-04 RX ADMIN — CHLORHEXIDINE GLUCONATE, 0.12% ORAL RINSE 15 ML: 1.2 SOLUTION DENTAL at 08:08

## 2024-11-04 RX ADMIN — ACETAMINOPHEN 650 MG: 650 SOLUTION ORAL at 19:47

## 2024-11-04 RX ADMIN — METHOCARBAMOL 1000 MG: 500 TABLET ORAL at 16:30

## 2024-11-04 RX ADMIN — POTASSIUM BICARBONATE 40 MEQ: 782 TABLET, EFFERVESCENT ORAL at 08:04

## 2024-11-04 RX ADMIN — BACITRACIN ZINC: 500 OINTMENT TOPICAL at 08:08

## 2024-11-04 RX ADMIN — OXYCODONE HYDROCHLORIDE 10 MG: 5 SOLUTION ORAL at 19:07

## 2024-11-04 RX ADMIN — HYDROCORTISONE SODIUM SUCCINATE 50 MG: 100 INJECTION, POWDER, FOR SOLUTION INTRAMUSCULAR; INTRAVENOUS at 05:51

## 2024-11-04 RX ADMIN — Medication 175 MCG/HR: at 19:43

## 2024-11-04 RX ADMIN — PROPOFOL 40 MCG/KG/MIN: 10 INJECTION, EMULSION INTRAVENOUS at 09:12

## 2024-11-04 RX ADMIN — MINERAL OIL, WHITE PETROLATUM: .03; .94 OINTMENT OPHTHALMIC at 05:02

## 2024-11-04 RX ADMIN — POTASSIUM CHLORIDE 40 MEQ: 1500 TABLET, EXTENDED RELEASE ORAL at 19:51

## 2024-11-04 RX ADMIN — HEPARIN SODIUM 7500 UNITS: 5000 INJECTION INTRAVENOUS; SUBCUTANEOUS at 05:52

## 2024-11-04 RX ADMIN — GABAPENTIN 300 MG: 300 CAPSULE ORAL at 08:07

## 2024-11-04 RX ADMIN — HYDROCORTISONE SODIUM SUCCINATE 50 MG: 100 INJECTION, POWDER, FOR SOLUTION INTRAMUSCULAR; INTRAVENOUS at 16:30

## 2024-11-04 RX ADMIN — SODIUM CHLORIDE, PRESERVATIVE FREE 10 ML: 5 INJECTION INTRAVENOUS at 08:16

## 2024-11-04 RX ADMIN — CALCIUM GLUCONATE 2000 MG: 98 INJECTION, SOLUTION INTRAVENOUS at 08:15

## 2024-11-04 RX ADMIN — PROPOFOL 40 MCG/KG/MIN: 10 INJECTION, EMULSION INTRAVENOUS at 05:50

## 2024-11-04 RX ADMIN — HYDROCORTISONE SODIUM SUCCINATE 50 MG: 100 INJECTION, POWDER, FOR SOLUTION INTRAMUSCULAR; INTRAVENOUS at 12:16

## 2024-11-04 RX ADMIN — SODIUM CHLORIDE, PRESERVATIVE FREE 40 MG: 5 INJECTION INTRAVENOUS at 08:07

## 2024-11-04 RX ADMIN — ACETAMINOPHEN 650 MG: 650 SOLUTION ORAL at 05:02

## 2024-11-04 RX ADMIN — CHLORHEXIDINE GLUCONATE, 0.12% ORAL RINSE 15 ML: 1.2 SOLUTION DENTAL at 19:50

## 2024-11-04 RX ADMIN — MINERAL OIL, WHITE PETROLATUM: .03; .94 OINTMENT OPHTHALMIC at 19:56

## 2024-11-04 RX ADMIN — OXYCODONE HYDROCHLORIDE 10 MG: 5 SOLUTION ORAL at 05:51

## 2024-11-04 RX ADMIN — INSULIN LISPRO 16 UNITS: 100 INJECTION, SOLUTION INTRAVENOUS; SUBCUTANEOUS at 06:00

## 2024-11-04 RX ADMIN — INSULIN LISPRO 12 UNITS: 100 INJECTION, SOLUTION INTRAVENOUS; SUBCUTANEOUS at 16:37

## 2024-11-04 RX ADMIN — GABAPENTIN 300 MG: 300 CAPSULE ORAL at 20:31

## 2024-11-04 RX ADMIN — INSULIN LISPRO 12 UNITS: 100 INJECTION, SOLUTION INTRAVENOUS; SUBCUTANEOUS at 08:10

## 2024-11-04 RX ADMIN — DEXTROSE MONOHYDRATE: 50 INJECTION, SOLUTION INTRAVENOUS at 14:44

## 2024-11-04 RX ADMIN — PROPOFOL 40 MCG/KG/MIN: 10 INJECTION, EMULSION INTRAVENOUS at 02:44

## 2024-11-04 RX ADMIN — MINERAL OIL, WHITE PETROLATUM: .03; .94 OINTMENT OPHTHALMIC at 12:16

## 2024-11-04 RX ADMIN — HEPARIN SODIUM 7500 UNITS: 5000 INJECTION INTRAVENOUS; SUBCUTANEOUS at 14:44

## 2024-11-04 RX ADMIN — Medication 175 MCG/HR: at 13:28

## 2024-11-04 RX ADMIN — WATER 2000 MG: 1 INJECTION INTRAMUSCULAR; INTRAVENOUS; SUBCUTANEOUS at 05:51

## 2024-11-04 RX ADMIN — PROPOFOL 25 MCG/KG/MIN: 10 INJECTION, EMULSION INTRAVENOUS at 14:44

## 2024-11-04 RX ADMIN — MINERAL OIL, WHITE PETROLATUM: .03; .94 OINTMENT OPHTHALMIC at 08:01

## 2024-11-04 RX ADMIN — SODIUM CHLORIDE, POTASSIUM CHLORIDE, SODIUM LACTATE AND CALCIUM CHLORIDE 1000 ML: 600; 310; 30; 20 INJECTION, SOLUTION INTRAVENOUS at 11:44

## 2024-11-04 RX ADMIN — DEXTROSE MONOHYDRATE: 50 INJECTION, SOLUTION INTRAVENOUS at 05:02

## 2024-11-04 RX ADMIN — CEFEPIME 2000 MG: 2 INJECTION, POWDER, FOR SOLUTION INTRAVENOUS at 23:36

## 2024-11-04 RX ADMIN — OXYCODONE HYDROCHLORIDE 10 MG: 5 SOLUTION ORAL at 12:16

## 2024-11-04 RX ADMIN — CEFEPIME 2000 MG: 2 INJECTION, POWDER, FOR SOLUTION INTRAVENOUS at 08:04

## 2024-11-04 RX ADMIN — INSULIN LISPRO 12 UNITS: 100 INJECTION, SOLUTION INTRAVENOUS; SUBCUTANEOUS at 19:55

## 2024-11-04 RX ADMIN — Medication 250 MG: at 12:16

## 2024-11-04 RX ADMIN — CEFEPIME 2000 MG: 2 INJECTION, POWDER, FOR SOLUTION INTRAVENOUS at 16:34

## 2024-11-04 RX ADMIN — PROPOFOL 25 MCG/KG/MIN: 10 INJECTION, EMULSION INTRAVENOUS at 19:11

## 2024-11-04 RX ADMIN — SODIUM CHLORIDE, POTASSIUM CHLORIDE, SODIUM LACTATE AND CALCIUM CHLORIDE 500 ML: 600; 310; 30; 20 INJECTION, SOLUTION INTRAVENOUS at 14:05

## 2024-11-04 RX ADMIN — METHOCARBAMOL 1000 MG: 500 TABLET ORAL at 19:51

## 2024-11-04 RX ADMIN — Medication 250 MG: at 16:30

## 2024-11-04 RX ADMIN — Medication 5 MCG/MIN: at 10:16

## 2024-11-04 RX ADMIN — Medication 250 MG: at 19:48

## 2024-11-04 RX ADMIN — HEPARIN SODIUM 7500 UNITS: 5000 INJECTION INTRAVENOUS; SUBCUTANEOUS at 22:00

## 2024-11-04 RX ADMIN — Medication 250 MG: at 08:04

## 2024-11-04 RX ADMIN — ACETAMINOPHEN 650 MG: 650 SOLUTION ORAL at 08:20

## 2024-11-04 RX ADMIN — INSULIN LISPRO 12 UNITS: 100 INJECTION, SOLUTION INTRAVENOUS; SUBCUTANEOUS at 23:38

## 2024-11-04 RX ADMIN — MINERAL OIL, WHITE PETROLATUM: .03; .94 OINTMENT OPHTHALMIC at 16:30

## 2024-11-04 RX ADMIN — ACETAMINOPHEN 650 MG: 650 SOLUTION ORAL at 16:30

## 2024-11-04 RX ADMIN — INSULIN GLARGINE 20 UNITS: 100 INJECTION, SOLUTION SUBCUTANEOUS at 20:31

## 2024-11-04 RX ADMIN — SODIUM CHLORIDE, PRESERVATIVE FREE 40 MG: 5 INJECTION INTRAVENOUS at 19:47

## 2024-11-04 ASSESSMENT — PULMONARY FUNCTION TESTS
PIF_VALUE: 18
PIF_VALUE: 23
PIF_VALUE: 20
PIF_VALUE: 16
PIF_VALUE: 20
PIF_VALUE: 18
PIF_VALUE: 19
PIF_VALUE: 34
PIF_VALUE: 19
PIF_VALUE: 21
PIF_VALUE: 22
PIF_VALUE: 26
PIF_VALUE: 24
PIF_VALUE: 21
PIF_VALUE: 22
PIF_VALUE: 18
PIF_VALUE: 20
PIF_VALUE: 21
PIF_VALUE: 34
PIF_VALUE: 21
PIF_VALUE: 19
PIF_VALUE: 19
PIF_VALUE: 21
PIF_VALUE: 21
PIF_VALUE: 17
PIF_VALUE: 21
PIF_VALUE: 21

## 2024-11-04 ASSESSMENT — PAIN SCALES - GENERAL: PAINLEVEL_OUTOF10: 2
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
## 2024-11-04 NOTE — PROGRESS NOTES
DAILY VENTILATOR WEANING ASSESSMENT PERFORMED    P/FIO2 Ratio = 148         (<100= do not Wean)                  Cs = 61                         (<32= Instability)  Plat. Pressure = 25  MV =8.87  RSBI =    Instabilities:       Cardiovascular =       CNS =       Respiratory =1       Metabolic =2    Parameters    no    Wean per protocol  no    Ask Physician for a weaning plan yes    Additional Comments: trial pt on psv 10 today    Performed by Violet Haider RCP RRT      Reference Table:    Cardiovascular     CNS      1. Mean BP less than or equal to 75   1. Neuromuscular blockade  2. Heart Rate greater than 130   2. RASS of -3, -4, -5  3. Myocardial Ischemia    3. RASS of +3, +4  4. Mechanical Assist Device    4. ICP greater than 15 or             Intracranial Hypertension         Respiratory      Metabolic  1. PEEP equal to or greater than 10cm/H20  1.Temp. (8hrs) less than 95 or > 103  2. Respiratory Rate greater than 35   2. WBC < 5000 or > 75726  3. Minute Volume greater than 15L  4. pH less than 7.30  5. Deteriorating chest X-ray         11/04/24 0947   Patient Observation   Pulse (!) 106   SpO2 96 %   Vent Information   Ventilator Day(s) 11   Ventilator -45   Vent Mode AC/PRVC   Ventilator Settings   FiO2  50 %   Insp Time (sec) 0.8 sec   Vt (Set, mL) 500 mL   Resp Rate (Set) 16 bpm   PEEP/CPAP (cmH2O) 10   Vent Patient Data (Readings)   Vt (Measured) 689 mL   Peak Inspiratory Pressure (cmH2O) 23 cmH2O   Rate Measured 18 br/min   Minute Volume (L/min) 8.87 Liters   Mean Airway Pressure (cmH2O) 15 cmH20   Plateau Pressure (cm H2O) 25 cm H2O   Driving Pressure 15   I:E Ratio 1:4.30   Pressure Sensitivity 2 cm H2O   Static Compliance (L/cm H2O) 61   I Time/ I Time % 0.8 s   Insp Rise Time (%) 50 %   Backup Apnea On   Backup Rate 16 Breaths Per Minute   Backup Vt 500   Vent Alarm Settings   High Pressure (cmH2O) 45 cmH2O   Low Minute Volume (lpm) 7 L/min   Low Exhaled Vt (ml) 450 mL   RR High (bpm) 30 br/min
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
## 2024-11-04 NOTE — PLAN OF CARE
Problem: Respiratory - Adult  Goal: Achieves optimal ventilation and oxygenation  11/4/2024 1833 by Violet Perez, RCMARIO  Outcome: Progressing

## 2024-11-04 NOTE — PROGRESS NOTES
Patient will reach at lines and tubes needing to be redirected often. Attempting to provide calm environment and reorientation, but patient still assessed to be a risk of harm to self. Will continue to monitor patient and assess for earliest removal capability.

## 2024-11-04 NOTE — PLAN OF CARE
Problem: Pain  Goal: Verbalizes/displays adequate comfort level or baseline comfort level  Outcome: Progressing     Problem: Safety - Adult  Goal: Free from fall injury  Outcome: Progressing     Problem: Skin/Tissue Integrity  Goal: Absence of new skin breakdown  Description: 1.  Monitor for areas of redness and/or skin breakdown  2.  Assess vascular access sites hourly  3.  Every 4-6 hours minimum:  Change oxygen saturation probe site  4.  Every 4-6 hours:  If on nasal continuous positive airway pressure, respiratory therapy assess nares and determine need for appliance change or resting period.  Outcome: Progressing     Problem: Safety - Medical Restraint  Goal: Remains free of injury from restraints (Restraint for Interference with Medical Device)  Description: INTERVENTIONS:  1. Determine that other, less restrictive measures have been tried or would not be effective before applying the restraint  2. Evaluate the patient's condition at the time of restraint application  3. Inform patient/family regarding the reason for restraint  4. Q2H: Monitor safety, psychosocial status, comfort, nutrition and hydration  11/3/2024 2133 by Lily Sweet, RN  Outcome: Progressing

## 2024-11-04 NOTE — PLAN OF CARE
Problem: Pain  Goal: Verbalizes/displays adequate comfort level or baseline comfort level  11/3/2024 2133 by Lily Sweet RN  Outcome: Progressing     Problem: Safety - Adult  Goal: Free from fall injury  11/3/2024 2133 by Lily Sweet RN  Outcome: Progressing     Problem: Skin/Tissue Integrity  Goal: Absence of new skin breakdown  Description: 1.  Monitor for areas of redness and/or skin breakdown  2.  Assess vascular access sites hourly  3.  Every 4-6 hours minimum:  Change oxygen saturation probe site  4.  Every 4-6 hours:  If on nasal continuous positive airway pressure, respiratory therapy assess nares and determine need for appliance change or resting period.  11/4/2024 0715 by Elicia Nguyễn RN  Outcome: Progressing  11/3/2024 2133 by Lily Sweet RN  Outcome: Progressing     Problem: ABCDS Injury Assessment  Goal: Absence of physical injury  11/3/2024 2133 by Lily Sweet RN  Outcome: Progressing     Problem: Safety - Medical Restraint  Goal: Remains free of injury from restraints (Restraint for Interference with Medical Device)  Description: INTERVENTIONS:  1. Determine that other, less restrictive measures have been tried or would not be effective before applying the restraint  2. Evaluate the patient's condition at the time of restraint application  3. Inform patient/family regarding the reason for restraint  4. Q2H: Monitor safety, psychosocial status, comfort, nutrition and hydration  11/4/2024 0715 by Elicia Nguyễn RN  Outcome: Progressing  11/3/2024 2133 by Lily Sweet RN  Outcome: Progressing     Problem: Chronic Conditions and Co-morbidities  Goal: Patient's chronic conditions and co-morbidity symptoms are monitored and maintained or improved  Outcome: Progressing     Problem: Neurosensory - Adult  Goal: Achieves stable or improved neurological status  Outcome: Progressing     Problem: Respiratory - Adult  Goal: Achieves optimal ventilation and
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
## 2024-11-05 ENCOUNTER — APPOINTMENT (OUTPATIENT)
Dept: GENERAL RADIOLOGY | Age: 49
End: 2024-11-05
Payer: MEDICARE

## 2024-11-05 ENCOUNTER — APPOINTMENT (OUTPATIENT)
Dept: ULTRASOUND IMAGING | Age: 49
End: 2024-11-05
Payer: MEDICARE

## 2024-11-05 LAB
AADO2: 201 MMHG
ABO/RH: NORMAL
ALBUMIN SERPL-MCNC: 2.3 G/DL (ref 3.5–5.2)
ALBUMIN SERPL-MCNC: 2.3 G/DL (ref 3.5–5.2)
ALP SERPL-CCNC: 239 U/L (ref 40–129)
ALP SERPL-CCNC: 243 U/L (ref 40–129)
ALT SERPL-CCNC: 67 U/L (ref 0–40)
ALT SERPL-CCNC: 70 U/L (ref 0–40)
ANION GAP SERPL CALCULATED.3IONS-SCNC: 5 MMOL/L (ref 7–16)
ANION GAP SERPL CALCULATED.3IONS-SCNC: 8 MMOL/L (ref 7–16)
ANION GAP SERPL CALCULATED.3IONS-SCNC: 9 MMOL/L (ref 7–16)
ANTIBODY SCREEN: NEGATIVE
ARM BAND NUMBER: NORMAL
AST SERPL-CCNC: 60 U/L (ref 0–39)
AST SERPL-CCNC: 60 U/L (ref 0–39)
B.E.: 6.7 MMOL/L (ref -3–3)
BASOPHILS # BLD: 0 K/UL (ref 0–0.2)
BASOPHILS # BLD: 0 K/UL (ref 0–0.2)
BASOPHILS NFR BLD: 0 % (ref 0–2)
BASOPHILS NFR BLD: 0 % (ref 0–2)
BILIRUB SERPL-MCNC: 1 MG/DL (ref 0–1.2)
BILIRUB SERPL-MCNC: 1.2 MG/DL (ref 0–1.2)
BLOOD BANK BLOOD PRODUCT EXPIRATION DATE: NORMAL
BLOOD BANK DISPENSE STATUS: NORMAL
BLOOD BANK ISBT PRODUCT BLOOD TYPE: 9500
BLOOD BANK PRODUCT CODE: NORMAL
BLOOD BANK SAMPLE EXPIRATION: NORMAL
BLOOD BANK UNIT TYPE AND RH: NORMAL
BPU ID: NORMAL
BUN SERPL-MCNC: 41 MG/DL (ref 6–20)
BUN SERPL-MCNC: 41 MG/DL (ref 6–20)
BUN SERPL-MCNC: 42 MG/DL (ref 6–20)
CA-I BLD-SCNC: 1.11 MMOL/L (ref 1.15–1.33)
CA-I BLD-SCNC: 1.13 MMOL/L (ref 1.15–1.33)
CALCIUM SERPL-MCNC: 7.6 MG/DL (ref 8.6–10.2)
CALCIUM SERPL-MCNC: 7.6 MG/DL (ref 8.6–10.2)
CALCIUM SERPL-MCNC: 8 MG/DL (ref 8.6–10.2)
CHLORIDE SERPL-SCNC: 112 MMOL/L (ref 98–107)
CHLORIDE SERPL-SCNC: 113 MMOL/L (ref 98–107)
CHLORIDE SERPL-SCNC: 113 MMOL/L (ref 98–107)
CK SERPL-CCNC: 789 U/L (ref 20–200)
CO2 SERPL-SCNC: 31 MMOL/L (ref 22–29)
CO2 SERPL-SCNC: 32 MMOL/L (ref 22–29)
CO2 SERPL-SCNC: 33 MMOL/L (ref 22–29)
COHB: 1.7 % (ref 0–1.5)
COMPONENT: NORMAL
CREAT SERPL-MCNC: 1 MG/DL (ref 0.7–1.2)
CRITICAL: ABNORMAL
CROSSMATCH RESULT: NORMAL
DATE ANALYZED: ABNORMAL
DATE OF COLLECTION: ABNORMAL
EOSINOPHIL # BLD: 0 K/UL (ref 0.05–0.5)
EOSINOPHIL # BLD: 0.42 K/UL (ref 0.05–0.5)
EOSINOPHILS RELATIVE PERCENT: 0 % (ref 0–6)
EOSINOPHILS RELATIVE PERCENT: 2 % (ref 0–6)
ERYTHROCYTE [DISTWIDTH] IN BLOOD BY AUTOMATED COUNT: 19 % (ref 11.5–15)
ERYTHROCYTE [DISTWIDTH] IN BLOOD BY AUTOMATED COUNT: 19.1 % (ref 11.5–15)
FIO2: 45 %
GFR, ESTIMATED: 88 ML/MIN/1.73M2
GFR, ESTIMATED: 89 ML/MIN/1.73M2
GFR, ESTIMATED: >90 ML/MIN/1.73M2
GLUCOSE BLD-MCNC: 201 MG/DL (ref 74–99)
GLUCOSE BLD-MCNC: 210 MG/DL (ref 74–99)
GLUCOSE BLD-MCNC: 214 MG/DL (ref 74–99)
GLUCOSE BLD-MCNC: 216 MG/DL (ref 74–99)
GLUCOSE BLD-MCNC: 217 MG/DL (ref 74–99)
GLUCOSE BLD-MCNC: 218 MG/DL (ref 74–99)
GLUCOSE SERPL-MCNC: 211 MG/DL (ref 74–99)
GLUCOSE SERPL-MCNC: 224 MG/DL (ref 74–99)
GLUCOSE SERPL-MCNC: 225 MG/DL (ref 74–99)
HCO3: 30.1 MMOL/L (ref 22–26)
HCT VFR BLD AUTO: 26.1 % (ref 37–54)
HCT VFR BLD AUTO: 26.2 % (ref 37–54)
HCT VFR BLD AUTO: 26.8 % (ref 37–54)
HGB BLD-MCNC: 7.9 G/DL (ref 12.5–16.5)
HGB BLD-MCNC: 8 G/DL (ref 12.5–16.5)
HGB BLD-MCNC: 8.1 G/DL (ref 12.5–16.5)
HHB: 3.7 % (ref 0–5)
LAB: ABNORMAL
LYMPHOCYTES NFR BLD: 0.59 K/UL (ref 1.5–4)
LYMPHOCYTES NFR BLD: 1.68 K/UL (ref 1.5–4)
LYMPHOCYTES RELATIVE PERCENT: 3 % (ref 20–42)
LYMPHOCYTES RELATIVE PERCENT: 7 % (ref 20–42)
Lab: 500
MAGNESIUM SERPL-MCNC: 2.3 MG/DL (ref 1.6–2.6)
MAGNESIUM SERPL-MCNC: 2.4 MG/DL (ref 1.6–2.6)
MCH RBC QN AUTO: 28.8 PG (ref 26–35)
MCH RBC QN AUTO: 29.1 PG (ref 26–35)
MCHC RBC AUTO-ENTMCNC: 30.2 G/DL (ref 32–34.5)
MCHC RBC AUTO-ENTMCNC: 30.5 G/DL (ref 32–34.5)
MCV RBC AUTO: 95.3 FL (ref 80–99.9)
MCV RBC AUTO: 95.4 FL (ref 80–99.9)
METHB: 0.1 % (ref 0–1.5)
MODE: AC
MONOCYTES NFR BLD: 0.39 K/UL (ref 0.1–0.95)
MONOCYTES NFR BLD: 0.63 K/UL (ref 0.1–0.95)
MONOCYTES NFR BLD: 2 % (ref 2–12)
MONOCYTES NFR BLD: 3 % (ref 2–12)
NEUTROPHILS NFR BLD: 89 % (ref 43–80)
NEUTROPHILS NFR BLD: 96 % (ref 43–80)
NEUTS SEG NFR BLD: 21.17 K/UL (ref 1.8–7.3)
NEUTS SEG NFR BLD: 21.71 K/UL (ref 1.8–7.3)
NUCLEATED RED BLOOD CELLS: 3 PER 100 WBC
O2 SATURATION: 96.2 % (ref 92–98.5)
O2HB: 94.5 % (ref 94–97)
OPERATOR ID: 2593
PATIENT TEMP: 37 C
PCO2: 38.2 MMHG (ref 35–45)
PEEP/CPAP: 8 CMH2O
PFO2: 1.7 MMHG/%
PH BLOOD GAS: 7.51 (ref 7.35–7.45)
PHOSPHATE SERPL-MCNC: 2.3 MG/DL (ref 2.5–4.5)
PHOSPHATE SERPL-MCNC: 3 MG/DL (ref 2.5–4.5)
PLATELET # BLD AUTO: 520 K/UL (ref 130–450)
PLATELET # BLD AUTO: 538 K/UL (ref 130–450)
PMV BLD AUTO: 11 FL (ref 7–12)
PMV BLD AUTO: 11.1 FL (ref 7–12)
PO2: 76.4 MMHG (ref 75–100)
POTASSIUM SERPL-SCNC: 3.1 MMOL/L (ref 3.5–5)
POTASSIUM SERPL-SCNC: 3.3 MMOL/L (ref 3.5–5)
POTASSIUM SERPL-SCNC: 3.6 MMOL/L (ref 3.5–5)
PROT SERPL-MCNC: 5.1 G/DL (ref 6.4–8.3)
PROT SERPL-MCNC: 5.2 G/DL (ref 6.4–8.3)
RBC # BLD AUTO: 2.75 M/UL (ref 3.8–5.8)
RBC # BLD AUTO: 2.81 M/UL (ref 3.8–5.8)
RBC # BLD: ABNORMAL 10*6/UL
RI(T): 2.63
RR MECHANICAL: 16 B/MIN
SODIUM SERPL-SCNC: 151 MMOL/L (ref 132–146)
SODIUM SERPL-SCNC: 152 MMOL/L (ref 132–146)
SODIUM SERPL-SCNC: 153 MMOL/L (ref 132–146)
SOURCE, BLOOD GAS: ABNORMAL
THB: 9 G/DL (ref 11.5–16.5)
TIME ANALYZED: 508
TRANSFUSION STATUS: NORMAL
UNIT DIVISION: 0
UNIT ISSUE DATE/TIME: NORMAL
VT MECHANICAL: 500 ML
WBC OTHER # BLD: 22.7 K/UL (ref 4.5–11.5)
WBC OTHER # BLD: 23.9 K/UL (ref 4.5–11.5)

## 2024-11-05 PROCEDURE — 37799 UNLISTED PX VASCULAR SURGERY: CPT

## 2024-11-05 PROCEDURE — 6370000000 HC RX 637 (ALT 250 FOR IP)

## 2024-11-05 PROCEDURE — 2580000003 HC RX 258: Performed by: SURGERY

## 2024-11-05 PROCEDURE — 6360000002 HC RX W HCPCS

## 2024-11-05 PROCEDURE — 94003 VENT MGMT INPAT SUBQ DAY: CPT

## 2024-11-05 PROCEDURE — 85014 HEMATOCRIT: CPT

## 2024-11-05 PROCEDURE — 93970 EXTREMITY STUDY: CPT

## 2024-11-05 PROCEDURE — 5A09357 ASSISTANCE WITH RESPIRATORY VENTILATION, LESS THAN 24 CONSECUTIVE HOURS, CONTINUOUS POSITIVE AIRWAY PRESSURE: ICD-10-PCS | Performed by: STUDENT IN AN ORGANIZED HEALTH CARE EDUCATION/TRAINING PROGRAM

## 2024-11-05 PROCEDURE — 82330 ASSAY OF CALCIUM: CPT

## 2024-11-05 PROCEDURE — 2000000000 HC ICU R&B

## 2024-11-05 PROCEDURE — 5A0935A ASSISTANCE WITH RESPIRATORY VENTILATION, LESS THAN 24 CONSECUTIVE HOURS, HIGH NASAL FLOW/VELOCITY: ICD-10-PCS | Performed by: STUDENT IN AN ORGANIZED HEALTH CARE EDUCATION/TRAINING PROGRAM

## 2024-11-05 PROCEDURE — 82962 GLUCOSE BLOOD TEST: CPT

## 2024-11-05 PROCEDURE — 94660 CPAP INITIATION&MGMT: CPT

## 2024-11-05 PROCEDURE — 2580000003 HC RX 258

## 2024-11-05 PROCEDURE — 83735 ASSAY OF MAGNESIUM: CPT

## 2024-11-05 PROCEDURE — 82805 BLOOD GASES W/O2 SATURATION: CPT

## 2024-11-05 PROCEDURE — 80053 COMPREHEN METABOLIC PANEL: CPT

## 2024-11-05 PROCEDURE — 2500000003 HC RX 250 WO HCPCS

## 2024-11-05 PROCEDURE — 2700000000 HC OXYGEN THERAPY PER DAY

## 2024-11-05 PROCEDURE — 80048 BASIC METABOLIC PNL TOTAL CA: CPT

## 2024-11-05 PROCEDURE — 85018 HEMOGLOBIN: CPT

## 2024-11-05 PROCEDURE — 99291 CRITICAL CARE FIRST HOUR: CPT | Performed by: STUDENT IN AN ORGANIZED HEALTH CARE EDUCATION/TRAINING PROGRAM

## 2024-11-05 PROCEDURE — 85025 COMPLETE CBC W/AUTO DIFF WBC: CPT

## 2024-11-05 PROCEDURE — 84100 ASSAY OF PHOSPHORUS: CPT

## 2024-11-05 PROCEDURE — 71045 X-RAY EXAM CHEST 1 VIEW: CPT

## 2024-11-05 PROCEDURE — 82550 ASSAY OF CK (CPK): CPT

## 2024-11-05 RX ORDER — HYDROMORPHONE HYDROCHLORIDE 1 MG/ML
1 INJECTION, SOLUTION INTRAMUSCULAR; INTRAVENOUS; SUBCUTANEOUS
Status: DISCONTINUED | OUTPATIENT
Start: 2024-11-05 | End: 2024-11-07

## 2024-11-05 RX ORDER — INSULIN GLARGINE 100 [IU]/ML
20 INJECTION, SOLUTION SUBCUTANEOUS 2 TIMES DAILY
Status: DISCONTINUED | OUTPATIENT
Start: 2024-11-05 | End: 2024-11-06

## 2024-11-05 RX ORDER — GABAPENTIN 300 MG/1
300 CAPSULE ORAL 3 TIMES DAILY
Status: DISCONTINUED | OUTPATIENT
Start: 2024-11-05 | End: 2024-11-14 | Stop reason: HOSPADM

## 2024-11-05 RX ORDER — DEXAMETHASONE SODIUM PHOSPHATE 10 MG/ML
10 INJECTION INTRAMUSCULAR; INTRAVENOUS ONCE
Status: COMPLETED | OUTPATIENT
Start: 2024-11-05 | End: 2024-11-05

## 2024-11-05 RX ORDER — DEXTROSE MONOHYDRATE 50 MG/ML
INJECTION, SOLUTION INTRAVENOUS CONTINUOUS
Status: DISCONTINUED | OUTPATIENT
Start: 2024-11-05 | End: 2024-11-06

## 2024-11-05 RX ORDER — SODIUM CHLORIDE, SODIUM LACTATE, POTASSIUM CHLORIDE, AND CALCIUM CHLORIDE .6; .31; .03; .02 G/100ML; G/100ML; G/100ML; G/100ML
1000 INJECTION, SOLUTION INTRAVENOUS ONCE
Status: COMPLETED | OUTPATIENT
Start: 2024-11-05 | End: 2024-11-05

## 2024-11-05 RX ADMIN — HYDROMORPHONE HYDROCHLORIDE 1 MG: 1 INJECTION, SOLUTION INTRAMUSCULAR; INTRAVENOUS; SUBCUTANEOUS at 20:10

## 2024-11-05 RX ADMIN — INSULIN LISPRO 4 UNITS: 100 INJECTION, SOLUTION INTRAVENOUS; SUBCUTANEOUS at 20:25

## 2024-11-05 RX ADMIN — MINERAL OIL, WHITE PETROLATUM: .03; .94 OINTMENT OPHTHALMIC at 04:51

## 2024-11-05 RX ADMIN — HYDROCORTISONE SODIUM SUCCINATE 50 MG: 100 INJECTION, POWDER, FOR SOLUTION INTRAMUSCULAR; INTRAVENOUS at 04:57

## 2024-11-05 RX ADMIN — METHOCARBAMOL 1000 MG: 500 TABLET ORAL at 17:04

## 2024-11-05 RX ADMIN — DEXMEDETOMIDINE HYDROCHLORIDE 0.2 MCG/KG/HR: 100 INJECTION, SOLUTION, CONCENTRATE INTRAVENOUS at 10:15

## 2024-11-05 RX ADMIN — ACETAMINOPHEN 650 MG: 650 SOLUTION ORAL at 14:59

## 2024-11-05 RX ADMIN — ACETAMINOPHEN 650 MG: 650 SOLUTION ORAL at 04:50

## 2024-11-05 RX ADMIN — INSULIN GLARGINE 20 UNITS: 100 INJECTION, SOLUTION SUBCUTANEOUS at 08:28

## 2024-11-05 RX ADMIN — POLYVINYL ALCOHOL, POVIDONE 1 DROP: 14; 6 SOLUTION/ DROPS OPHTHALMIC at 12:29

## 2024-11-05 RX ADMIN — CEFEPIME 2000 MG: 2 INJECTION, POWDER, FOR SOLUTION INTRAVENOUS at 09:01

## 2024-11-05 RX ADMIN — MINERAL OIL, WHITE PETROLATUM: .03; .94 OINTMENT OPHTHALMIC at 08:25

## 2024-11-05 RX ADMIN — HYDROCORTISONE SODIUM SUCCINATE 50 MG: 100 INJECTION, POWDER, FOR SOLUTION INTRAMUSCULAR; INTRAVENOUS at 23:43

## 2024-11-05 RX ADMIN — MINERAL OIL, WHITE PETROLATUM: .03; .94 OINTMENT OPHTHALMIC at 14:59

## 2024-11-05 RX ADMIN — SODIUM CHLORIDE, PRESERVATIVE FREE 10 ML: 5 INJECTION INTRAVENOUS at 20:21

## 2024-11-05 RX ADMIN — Medication 250 MG: at 08:26

## 2024-11-05 RX ADMIN — Medication 150 MCG/HR: at 08:58

## 2024-11-05 RX ADMIN — Medication 250 MG: at 20:21

## 2024-11-05 RX ADMIN — CHLORHEXIDINE GLUCONATE, 0.12% ORAL RINSE 15 ML: 1.2 SOLUTION DENTAL at 08:25

## 2024-11-05 RX ADMIN — CALCIUM GLUCONATE 2000 MG: 98 INJECTION, SOLUTION INTRAVENOUS at 08:53

## 2024-11-05 RX ADMIN — INSULIN GLARGINE 20 UNITS: 100 INJECTION, SOLUTION SUBCUTANEOUS at 20:20

## 2024-11-05 RX ADMIN — Medication 150 MCG/HR: at 01:32

## 2024-11-05 RX ADMIN — OXYCODONE HYDROCHLORIDE 10 MG: 5 SOLUTION ORAL at 00:23

## 2024-11-05 RX ADMIN — HEPARIN SODIUM 7500 UNITS: 5000 INJECTION INTRAVENOUS; SUBCUTANEOUS at 05:31

## 2024-11-05 RX ADMIN — GABAPENTIN 300 MG: 300 CAPSULE ORAL at 08:26

## 2024-11-05 RX ADMIN — DEXAMETHASONE SODIUM PHOSPHATE 10 MG: 10 INJECTION INTRAMUSCULAR; INTRAVENOUS at 15:09

## 2024-11-05 RX ADMIN — HYDROCORTISONE SODIUM SUCCINATE 50 MG: 100 INJECTION, POWDER, FOR SOLUTION INTRAMUSCULAR; INTRAVENOUS at 12:30

## 2024-11-05 RX ADMIN — OXYCODONE HYDROCHLORIDE 10 MG: 5 SOLUTION ORAL at 07:01

## 2024-11-05 RX ADMIN — Medication 250 MG: at 12:29

## 2024-11-05 RX ADMIN — DEXTROSE MONOHYDRATE: 50 INJECTION, SOLUTION INTRAVENOUS at 18:24

## 2024-11-05 RX ADMIN — SODIUM CHLORIDE, PRESERVATIVE FREE 40 MG: 5 INJECTION INTRAVENOUS at 08:27

## 2024-11-05 RX ADMIN — ACETAMINOPHEN 650 MG: 650 SOLUTION ORAL at 20:22

## 2024-11-05 RX ADMIN — POTASSIUM CHLORIDE 40 MEQ: 1500 TABLET, EXTENDED RELEASE ORAL at 20:20

## 2024-11-05 RX ADMIN — INSULIN LISPRO 8 UNITS: 100 INJECTION, SOLUTION INTRAVENOUS; SUBCUTANEOUS at 15:08

## 2024-11-05 RX ADMIN — PROPOFOL 25 MCG/KG/MIN: 10 INJECTION, EMULSION INTRAVENOUS at 04:06

## 2024-11-05 RX ADMIN — CEFEPIME 2000 MG: 2 INJECTION, POWDER, FOR SOLUTION INTRAVENOUS at 23:43

## 2024-11-05 RX ADMIN — SODIUM CHLORIDE: 9 INJECTION, SOLUTION INTRAVENOUS at 08:52

## 2024-11-05 RX ADMIN — CHLORHEXIDINE GLUCONATE, 0.12% ORAL RINSE 15 ML: 1.2 SOLUTION DENTAL at 20:20

## 2024-11-05 RX ADMIN — BACITRACIN ZINC: 500 OINTMENT TOPICAL at 08:26

## 2024-11-05 RX ADMIN — POTASSIUM BICARBONATE 40 MEQ: 782 TABLET, EFFERVESCENT ORAL at 08:26

## 2024-11-05 RX ADMIN — GABAPENTIN 300 MG: 300 CAPSULE ORAL at 20:21

## 2024-11-05 RX ADMIN — METHOCARBAMOL 1000 MG: 500 TABLET ORAL at 08:26

## 2024-11-05 RX ADMIN — METHOCARBAMOL 1000 MG: 500 TABLET ORAL at 12:29

## 2024-11-05 RX ADMIN — INSULIN LISPRO 8 UNITS: 100 INJECTION, SOLUTION INTRAVENOUS; SUBCUTANEOUS at 12:28

## 2024-11-05 RX ADMIN — OXYCODONE HYDROCHLORIDE 10 MG: 5 SOLUTION ORAL at 12:28

## 2024-11-05 RX ADMIN — HEPARIN SODIUM 7500 UNITS: 5000 INJECTION INTRAVENOUS; SUBCUTANEOUS at 22:30

## 2024-11-05 RX ADMIN — OXYCODONE HYDROCHLORIDE 10 MG: 5 SOLUTION ORAL at 18:18

## 2024-11-05 RX ADMIN — INSULIN LISPRO 8 UNITS: 100 INJECTION, SOLUTION INTRAVENOUS; SUBCUTANEOUS at 08:27

## 2024-11-05 RX ADMIN — PROPOFOL 25 MCG/KG/MIN: 10 INJECTION, EMULSION INTRAVENOUS at 08:56

## 2024-11-05 RX ADMIN — Medication 250 MG: at 17:05

## 2024-11-05 RX ADMIN — OXYCODONE HYDROCHLORIDE 10 MG: 5 SOLUTION ORAL at 23:44

## 2024-11-05 RX ADMIN — DEXTROSE MONOHYDRATE: 50 INJECTION, SOLUTION INTRAVENOUS at 10:11

## 2024-11-05 RX ADMIN — METHOCARBAMOL 1000 MG: 500 TABLET ORAL at 20:20

## 2024-11-05 RX ADMIN — CEFEPIME 2000 MG: 2 INJECTION, POWDER, FOR SOLUTION INTRAVENOUS at 16:55

## 2024-11-05 RX ADMIN — ACETAMINOPHEN 650 MG: 650 SOLUTION ORAL at 08:25

## 2024-11-05 RX ADMIN — SODIUM CHLORIDE, POTASSIUM CHLORIDE, SODIUM LACTATE AND CALCIUM CHLORIDE 1000 ML: 600; 310; 30; 20 INJECTION, SOLUTION INTRAVENOUS at 00:59

## 2024-11-05 RX ADMIN — HYDROCORTISONE SODIUM SUCCINATE 50 MG: 100 INJECTION, POWDER, FOR SOLUTION INTRAMUSCULAR; INTRAVENOUS at 17:05

## 2024-11-05 RX ADMIN — INSULIN LISPRO 8 UNITS: 100 INJECTION, SOLUTION INTRAVENOUS; SUBCUTANEOUS at 23:57

## 2024-11-05 RX ADMIN — INSULIN LISPRO 8 UNITS: 100 INJECTION, SOLUTION INTRAVENOUS; SUBCUTANEOUS at 04:56

## 2024-11-05 RX ADMIN — GABAPENTIN 300 MG: 300 CAPSULE ORAL at 14:59

## 2024-11-05 RX ADMIN — SODIUM CHLORIDE, PRESERVATIVE FREE 40 MG: 5 INJECTION INTRAVENOUS at 20:20

## 2024-11-05 RX ADMIN — HEPARIN SODIUM 7500 UNITS: 5000 INJECTION INTRAVENOUS; SUBCUTANEOUS at 14:59

## 2024-11-05 ASSESSMENT — PULMONARY FUNCTION TESTS
PIF_VALUE: 29
PIF_VALUE: 14
PIF_VALUE: 43
PIF_VALUE: 17
PIF_VALUE: 25
PIF_VALUE: 32
PIF_VALUE: 15
PIF_VALUE: 22
PIF_VALUE: 14
PIF_VALUE: 16
PIF_VALUE: 15
PIF_VALUE: 25
PIF_VALUE: 14
PIF_VALUE: 32
PIF_VALUE: 16
PIF_VALUE: 14
PIF_VALUE: 4
PIF_VALUE: 13
PIF_VALUE: 14
PIF_VALUE: 20
PIF_VALUE: 23

## 2024-11-05 ASSESSMENT — PAIN DESCRIPTION - LOCATION
LOCATION: GENERALIZED
LOCATION: GENERALIZED

## 2024-11-05 ASSESSMENT — PAIN SCALES - GENERAL
PAINLEVEL_OUTOF10: 0
PAINLEVEL_OUTOF10: 8
PAINLEVEL_OUTOF10: 8
PAINLEVEL_OUTOF10: 0

## 2024-11-05 ASSESSMENT — PAIN DESCRIPTION - PAIN TYPE: TYPE: ACUTE PAIN;CHRONIC PAIN

## 2024-11-05 ASSESSMENT — PAIN DESCRIPTION - ONSET: ONSET: ON-GOING

## 2024-11-05 ASSESSMENT — PAIN DESCRIPTION - ORIENTATION
ORIENTATION: RIGHT;LEFT;ANTERIOR;POSTERIOR
ORIENTATION: RIGHT;LEFT;ANTERIOR;POSTERIOR

## 2024-11-05 ASSESSMENT — PAIN DESCRIPTION - DESCRIPTORS
DESCRIPTORS: ACHING;SORE;DISCOMFORT
DESCRIPTORS: ACHING;DISCOMFORT;SORE

## 2024-11-05 ASSESSMENT — PAIN SCALES - WONG BAKER: WONGBAKER_NUMERICALRESPONSE: HURTS A LITTLE BIT

## 2024-11-05 ASSESSMENT — PAIN DESCRIPTION - FREQUENCY: FREQUENCY: CONTINUOUS

## 2024-11-05 NOTE — PROGRESS NOTES
11/05/24 1746   NIV Type   $NIV $Daily Charge   NIV Started/Stopped (S)  On   Equipment Type (S)  V60   Mode Bilevel   Mask Type Full face mask   Mask Size Medium   Assessment   Pulse 87   Respirations 27   SpO2 90 %   Comfort Level Good   Using Accessory Muscles No   Mask Compliance Good   Skin Assessment Clean, dry, & intact   Skin Protection for O2 Device Yes   Orientation Middle   Location Nose   Intervention(s) Skin Barrier   Settings/Measurements   PIP Observed 17 cm H20   IPAP (S)  16 cmH20   CPAP/EPAP (S)  8 cmH2O   Vt (Measured) 673 mL   Rate Ordered 14   FiO2  (S)  60 %   Minute Volume (L/min) 12.6 Liters   Mask Leak (lpm) 58 lpm   Patient's Home Machine No   Alarm Settings   Alarms On Y   Patient Observation   Patient Observations (S)  Patient placed on BIPAP per order

## 2024-11-05 NOTE — PROGRESS NOTES
Spontaneous Parameters performed    VT = 553 ml     f = 18  B/M  Ve = 9.96 L/M  NIF = - 19  cmH2O  VC = N/A  RSBI = 34      Performed by Randy Sparks RCP
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
## 2024-11-05 NOTE — PLAN OF CARE
Problem: Safety - Medical Restraint  Goal: Remains free of injury from restraints (Restraint for Interference with Medical Device)  Description: INTERVENTIONS:  1. Determine that other, less restrictive measures have been tried or would not be effective before applying the restraint  2. Evaluate the patient's condition at the time of restraint application  3. Inform patient/family regarding the reason for restraint  4. Q2H: Monitor safety, psychosocial status, comfort, nutrition and hydration  Outcome: Progressing     Problem: Discharge Planning  Goal: Discharge to home or other facility with appropriate resources  Outcome: Not Progressing     Problem: Neurosensory - Adult  Goal: Achieves maximal functionality and self care  Outcome: Not Progressing     Problem: Musculoskeletal - Adult  Goal: Return mobility to safest level of function  Outcome: Not Progressing  Goal: Return ADL status to a safe level of function  Outcome: Not Progressing

## 2024-11-05 NOTE — PROGRESS NOTES
Patient was extubated to 8 liters/min via  High flow nasal cannula . Breath Sounds post extubation were clear. Stridor was not present post extubation. SPO2 was 91%.      Performed by  Randy Sparks RCP

## 2024-11-05 NOTE — PROGRESS NOTES
Department of Orthopedic Surgery  Resident Progress Note    Patient seen and examined at bedside this morning, patient stable overnight.  Patient is currently intubated and sedated.  Per SICU staff, patient has been moving all 4 extremities.  When sedation is turned down, patient is reportedly responding to commands appropriately.    VITALS:  BP (!) 130/56   Pulse (!) 109   Temp 99.9 °F (37.7 °C) (Bladder)   Resp 17   Ht 1.854 m (6' 1\")   Wt (!) 159.7 kg (352 lb)   SpO2 100%   BMI 46.44 kg/m²     General: in no acute distress, somnolent, responds to pain    MUSCULOSKELETAL:   Left lower extremity:  Dressing clean dry and intact knee immobilizer in place   compartments soft and compressible  Unable to assess PF/DF/EHL  +2/4 DP & PT pulses, Brisk Cap refill, Toes warm and perfused  Unable to assess distal sensation to Peroneals, Sural, Saphenous, and tibial nrs    right Lower Extremity  Aquacel in place small saturation well-contained within Aquacel  Compartments soft and compressible  Palpable dorsalis pedis and posterior tibialis pulse, brisk cap refill to toes, foot warm and perfused  CBC:   Lab Results   Component Value Date/Time    WBC 23.9 11/05/2024 04:49 AM    HGB 8.1 11/05/2024 04:49 AM    HCT 26.8 11/05/2024 04:49 AM     11/05/2024 04:49 AM     PT/INR:    Lab Results   Component Value Date/Time    PROTIME 16.1 10/24/2024 06:17 PM    INR 1.5 10/24/2024 06:17 PM       ASSESSMENT  S/P right posterior column ORIF - 10/30/24  Left periprosthetic posterio wall posterior column fracture  Left greater trochanteric fracture  Status post left tibial plateau fracture open reduction internal fixation 11-3      PLAN      Continue physical therapy and protocol: NWB -bilateral LE  Abx per admitting  Deep venous thrombosis prophylaxis - per SICU, early mobilization  Can take off the dressing at the surgical site seven days after the date of surgery on right side. Can just peel off. After, do daily dressing
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
## 2024-11-05 NOTE — FLOWSHEET NOTE
Patient intubated and becoming restless at times attempting to reach for ETT and lines for critical care management despite attempts of redirection/reorientation. Will continue use of bilateral soft wrist restraints at this time. Will continue to assess and monitor for earliest removal.

## 2024-11-05 NOTE — PLAN OF CARE
Problem: Safety - Medical Restraint  Goal: Remains free of injury from restraints (Restraint for Interference with Medical Device)  Description: INTERVENTIONS:  1. Determine that other, less restrictive measures have been tried or would not be effective before applying the restraint  2. Evaluate the patient's condition at the time of restraint application  3. Inform patient/family regarding the reason for restraint  4. Q2H: Monitor safety, psychosocial status, comfort, nutrition and hydration  11/4/2024 1933 by Lily Sweet RN  Outcome: Progressing     Problem: Respiratory - Adult  Goal: Achieves optimal ventilation and oxygenation  11/4/2024 1933 by Lily Sweet RN  Outcome: Progressing     Problem: Cardiovascular - Adult  Goal: Maintains optimal cardiac output and hemodynamic stability  Outcome: Progressing     Problem: Metabolic/Fluid and Electrolytes - Adult  Goal: Electrolytes maintained within normal limits  Outcome: Progressing

## 2024-11-06 ENCOUNTER — APPOINTMENT (OUTPATIENT)
Dept: CT IMAGING | Age: 49
End: 2024-11-06
Payer: MEDICARE

## 2024-11-06 ENCOUNTER — APPOINTMENT (OUTPATIENT)
Dept: GENERAL RADIOLOGY | Age: 49
End: 2024-11-06
Payer: MEDICARE

## 2024-11-06 LAB
AADO2: 323.1 MMHG
AADO2: 492.5 MMHG
ALBUMIN SERPL-MCNC: 2.4 G/DL (ref 3.5–5.2)
ALBUMIN SERPL-MCNC: 2.5 G/DL (ref 3.5–5.2)
ALP SERPL-CCNC: 249 U/L (ref 40–129)
ALP SERPL-CCNC: 265 U/L (ref 40–129)
ALT SERPL-CCNC: 107 U/L (ref 0–40)
ALT SERPL-CCNC: 78 U/L (ref 0–40)
ANION GAP SERPL CALCULATED.3IONS-SCNC: 10 MMOL/L (ref 7–16)
ANION GAP SERPL CALCULATED.3IONS-SCNC: 11 MMOL/L (ref 7–16)
ANION GAP SERPL CALCULATED.3IONS-SCNC: 11 MMOL/L (ref 7–16)
AST SERPL-CCNC: 118 U/L (ref 0–39)
AST SERPL-CCNC: 71 U/L (ref 0–39)
B.E.: 4.3 MMOL/L (ref -3–3)
B.E.: 5.8 MMOL/L (ref -3–3)
BASOPHILS # BLD: 0.04 K/UL (ref 0–0.2)
BASOPHILS # BLD: 0.05 K/UL (ref 0–0.2)
BASOPHILS NFR BLD: 0 % (ref 0–2)
BASOPHILS NFR BLD: 0 % (ref 0–2)
BILIRUB SERPL-MCNC: 1.3 MG/DL (ref 0–1.2)
BILIRUB SERPL-MCNC: 1.5 MG/DL (ref 0–1.2)
BUN SERPL-MCNC: 39 MG/DL (ref 6–20)
BUN SERPL-MCNC: 39 MG/DL (ref 6–20)
BUN SERPL-MCNC: 40 MG/DL (ref 6–20)
CA-I BLD-SCNC: 1.07 MMOL/L (ref 1.15–1.33)
CA-I BLD-SCNC: 1.13 MMOL/L (ref 1.15–1.33)
CALCIUM SERPL-MCNC: 7.6 MG/DL (ref 8.6–10.2)
CALCIUM SERPL-MCNC: 7.9 MG/DL (ref 8.6–10.2)
CALCIUM SERPL-MCNC: 7.9 MG/DL (ref 8.6–10.2)
CHLORIDE SERPL-SCNC: 112 MMOL/L (ref 98–107)
CHLORIDE SERPL-SCNC: 113 MMOL/L (ref 98–107)
CHLORIDE SERPL-SCNC: 114 MMOL/L (ref 98–107)
CO2 SERPL-SCNC: 28 MMOL/L (ref 22–29)
CO2 SERPL-SCNC: 29 MMOL/L (ref 22–29)
CO2 SERPL-SCNC: 29 MMOL/L (ref 22–29)
COHB: 0 % (ref 0–1.5)
COHB: 1.2 % (ref 0–1.5)
CREAT SERPL-MCNC: 1 MG/DL (ref 0.7–1.2)
CRITICAL: ABNORMAL
CRITICAL: ABNORMAL
DATE ANALYZED: ABNORMAL
DATE ANALYZED: ABNORMAL
DATE OF COLLECTION: ABNORMAL
DATE OF COLLECTION: ABNORMAL
EKG ATRIAL RATE: 114 BPM
EKG P AXIS: 46 DEGREES
EKG P-R INTERVAL: 150 MS
EKG Q-T INTERVAL: 332 MS
EKG QRS DURATION: 92 MS
EKG QTC CALCULATION (BAZETT): 457 MS
EKG R AXIS: 29 DEGREES
EKG T AXIS: 41 DEGREES
EKG VENTRICULAR RATE: 114 BPM
EOSINOPHIL # BLD: 0.02 K/UL (ref 0.05–0.5)
EOSINOPHIL # BLD: 0.25 K/UL (ref 0.05–0.5)
EOSINOPHILS RELATIVE PERCENT: 0 % (ref 0–6)
EOSINOPHILS RELATIVE PERCENT: 1 % (ref 0–6)
ERYTHROCYTE [DISTWIDTH] IN BLOOD BY AUTOMATED COUNT: 19 % (ref 11.5–15)
ERYTHROCYTE [DISTWIDTH] IN BLOOD BY AUTOMATED COUNT: 19.1 % (ref 11.5–15)
FIO2: 100 %
FIO2: 60 %
GFR, ESTIMATED: 88 ML/MIN/1.73M2
GFR, ESTIMATED: 88 ML/MIN/1.73M2
GFR, ESTIMATED: 89 ML/MIN/1.73M2
GLUCOSE BLD-MCNC: 103 MG/DL (ref 74–99)
GLUCOSE BLD-MCNC: 104 MG/DL (ref 74–99)
GLUCOSE BLD-MCNC: 123 MG/DL (ref 74–99)
GLUCOSE BLD-MCNC: 157 MG/DL (ref 74–99)
GLUCOSE BLD-MCNC: 191 MG/DL (ref 74–99)
GLUCOSE SERPL-MCNC: 113 MG/DL (ref 74–99)
GLUCOSE SERPL-MCNC: 113 MG/DL (ref 74–99)
GLUCOSE SERPL-MCNC: 179 MG/DL (ref 74–99)
HCO3: 26.6 MMOL/L (ref 22–26)
HCO3: 29.7 MMOL/L (ref 22–26)
HCT VFR BLD AUTO: 27.9 % (ref 37–54)
HCT VFR BLD AUTO: 29.4 % (ref 37–54)
HGB BLD-MCNC: 8.5 G/DL (ref 12.5–16.5)
HGB BLD-MCNC: 8.9 G/DL (ref 12.5–16.5)
HHB: 1.2 % (ref 0–5)
HHB: 5.2 % (ref 0–5)
IMM GRANULOCYTES # BLD AUTO: 0.44 K/UL (ref 0–0.58)
IMM GRANULOCYTES # BLD AUTO: 0.56 K/UL (ref 0–0.58)
IMM GRANULOCYTES NFR BLD: 2 % (ref 0–5)
IMM GRANULOCYTES NFR BLD: 2 % (ref 0–5)
LAB: ABNORMAL
LAB: ABNORMAL
LYMPHOCYTES NFR BLD: 1.57 K/UL (ref 1.5–4)
LYMPHOCYTES NFR BLD: 2.32 K/UL (ref 1.5–4)
LYMPHOCYTES RELATIVE PERCENT: 7 % (ref 20–42)
LYMPHOCYTES RELATIVE PERCENT: 9 % (ref 20–42)
Lab: 1656
Lab: 2025
MAGNESIUM SERPL-MCNC: 2.1 MG/DL (ref 1.6–2.6)
MAGNESIUM SERPL-MCNC: 2.4 MG/DL (ref 1.6–2.6)
MCH RBC QN AUTO: 28.7 PG (ref 26–35)
MCH RBC QN AUTO: 29.3 PG (ref 26–35)
MCHC RBC AUTO-ENTMCNC: 30.3 G/DL (ref 32–34.5)
MCHC RBC AUTO-ENTMCNC: 30.5 G/DL (ref 32–34.5)
MCV RBC AUTO: 94.8 FL (ref 80–99.9)
MCV RBC AUTO: 96.2 FL (ref 80–99.9)
METHB: 0.3 % (ref 0–1.5)
METHB: 0.3 % (ref 0–1.5)
MODE: ABNORMAL
MODE: AC
MONOCYTES NFR BLD: 1.6 K/UL (ref 0.1–0.95)
MONOCYTES NFR BLD: 1.64 K/UL (ref 0.1–0.95)
MONOCYTES NFR BLD: 6 % (ref 2–12)
MONOCYTES NFR BLD: 7 % (ref 2–12)
NEUTROPHILS NFR BLD: 82 % (ref 43–80)
NEUTROPHILS NFR BLD: 84 % (ref 43–80)
NEUTS SEG NFR BLD: 19.31 K/UL (ref 1.8–7.3)
NEUTS SEG NFR BLD: 21.39 K/UL (ref 1.8–7.3)
O2 SATURATION: 94.7 % (ref 92–98.5)
O2 SATURATION: 98.8 % (ref 92–98.5)
O2HB: 93.3 % (ref 94–97)
O2HB: 98.5 % (ref 94–97)
OPERATOR ID: 366
OPERATOR ID: 366
PATIENT TEMP: 37 C
PATIENT TEMP: 37 C
PCO2: 31.6 MMHG (ref 35–45)
PCO2: 40.2 MMHG (ref 35–45)
PEEP/CPAP: 8 CMH2O
PEEP/CPAP: 8 CMH2O
PFO2: 1.17 MMHG/%
PFO2: 1.55 MMHG/%
PH BLOOD GAS: 7.49 (ref 7.35–7.45)
PH BLOOD GAS: 7.54 (ref 7.35–7.45)
PHOSPHATE SERPL-MCNC: 2.3 MG/DL (ref 2.5–4.5)
PHOSPHATE SERPL-MCNC: 2.7 MG/DL (ref 2.5–4.5)
PLATELET # BLD AUTO: 572 K/UL (ref 130–450)
PLATELET # BLD AUTO: 624 K/UL (ref 130–450)
PMV BLD AUTO: 10.6 FL (ref 7–12)
PMV BLD AUTO: 11.3 FL (ref 7–12)
PO2: 155.3 MMHG (ref 75–100)
PO2: 69.9 MMHG (ref 75–100)
POTASSIUM SERPL-SCNC: 3 MMOL/L (ref 3.5–5)
POTASSIUM SERPL-SCNC: 3.1 MMOL/L (ref 3.5–5)
POTASSIUM SERPL-SCNC: 3.2 MMOL/L (ref 3.5–5)
PROT SERPL-MCNC: 5.3 G/DL (ref 6.4–8.3)
PROT SERPL-MCNC: 5.6 G/DL (ref 6.4–8.3)
PS: 16 CMH20
RBC # BLD AUTO: 2.9 M/UL (ref 3.8–5.8)
RBC # BLD AUTO: 3.1 M/UL (ref 3.8–5.8)
RBC # BLD: ABNORMAL 10*6/UL
RI(T): 3.17
RI(T): 4.62
RR MECHANICAL: 16 B/MIN
SODIUM SERPL-SCNC: 151 MMOL/L (ref 132–146)
SODIUM SERPL-SCNC: 153 MMOL/L (ref 132–146)
SODIUM SERPL-SCNC: 153 MMOL/L (ref 132–146)
SOURCE, BLOOD GAS: ABNORMAL
SOURCE, BLOOD GAS: ABNORMAL
THB: 10.1 G/DL (ref 11.5–16.5)
THB: 8.9 G/DL (ref 11.5–16.5)
TIME ANALYZED: 1706
TIME ANALYZED: 2028
TRIGL SERPL-MCNC: 249 MG/DL
TROPONIN I SERPL HS-MCNC: 125 NG/L (ref 0–11)
VT MECHANICAL: 500 ML
WBC OTHER # BLD: 23.1 K/UL (ref 4.5–11.5)
WBC OTHER # BLD: 26.1 K/UL (ref 4.5–11.5)

## 2024-11-06 PROCEDURE — 84484 ASSAY OF TROPONIN QUANT: CPT

## 2024-11-06 PROCEDURE — 71275 CT ANGIOGRAPHY CHEST: CPT

## 2024-11-06 PROCEDURE — 82805 BLOOD GASES W/O2 SATURATION: CPT

## 2024-11-06 PROCEDURE — 2580000003 HC RX 258

## 2024-11-06 PROCEDURE — 74177 CT ABD & PELVIS W/CONTRAST: CPT

## 2024-11-06 PROCEDURE — 84478 ASSAY OF TRIGLYCERIDES: CPT

## 2024-11-06 PROCEDURE — 6370000000 HC RX 637 (ALT 250 FOR IP)

## 2024-11-06 PROCEDURE — 2500000003 HC RX 250 WO HCPCS

## 2024-11-06 PROCEDURE — 31500 INSERT EMERGENCY AIRWAY: CPT

## 2024-11-06 PROCEDURE — 0BH17EZ INSERTION OF ENDOTRACHEAL AIRWAY INTO TRACHEA, VIA NATURAL OR ARTIFICIAL OPENING: ICD-10-PCS

## 2024-11-06 PROCEDURE — 71045 X-RAY EXAM CHEST 1 VIEW: CPT

## 2024-11-06 PROCEDURE — 82330 ASSAY OF CALCIUM: CPT

## 2024-11-06 PROCEDURE — 2700000000 HC OXYGEN THERAPY PER DAY

## 2024-11-06 PROCEDURE — 6360000002 HC RX W HCPCS

## 2024-11-06 PROCEDURE — 85025 COMPLETE CBC W/AUTO DIFF WBC: CPT

## 2024-11-06 PROCEDURE — 83735 ASSAY OF MAGNESIUM: CPT

## 2024-11-06 PROCEDURE — 94660 CPAP INITIATION&MGMT: CPT

## 2024-11-06 PROCEDURE — 37799 UNLISTED PX VASCULAR SURGERY: CPT

## 2024-11-06 PROCEDURE — 99291 CRITICAL CARE FIRST HOUR: CPT | Performed by: STUDENT IN AN ORGANIZED HEALTH CARE EDUCATION/TRAINING PROGRAM

## 2024-11-06 PROCEDURE — 93005 ELECTROCARDIOGRAM TRACING: CPT

## 2024-11-06 PROCEDURE — 2000000000 HC ICU R&B

## 2024-11-06 PROCEDURE — 93010 ELECTROCARDIOGRAM REPORT: CPT | Performed by: INTERNAL MEDICINE

## 2024-11-06 PROCEDURE — 94640 AIRWAY INHALATION TREATMENT: CPT

## 2024-11-06 PROCEDURE — 80048 BASIC METABOLIC PNL TOTAL CA: CPT

## 2024-11-06 PROCEDURE — 80053 COMPREHEN METABOLIC PANEL: CPT

## 2024-11-06 PROCEDURE — 6360000004 HC RX CONTRAST MEDICATION: Performed by: RADIOLOGY

## 2024-11-06 PROCEDURE — 31500 INSERT EMERGENCY AIRWAY: CPT | Performed by: SURGERY

## 2024-11-06 PROCEDURE — 94664 DEMO&/EVAL PT USE INHALER: CPT

## 2024-11-06 PROCEDURE — 94003 VENT MGMT INPAT SUBQ DAY: CPT

## 2024-11-06 PROCEDURE — 84100 ASSAY OF PHOSPHORUS: CPT

## 2024-11-06 PROCEDURE — 82962 GLUCOSE BLOOD TEST: CPT

## 2024-11-06 PROCEDURE — 2580000003 HC RX 258: Performed by: SURGERY

## 2024-11-06 RX ORDER — HYDROCORTISONE SODIUM SUCCINATE 100 MG/2ML
25 INJECTION INTRAMUSCULAR; INTRAVENOUS EVERY 12 HOURS
Status: COMPLETED | OUTPATIENT
Start: 2024-11-07 | End: 2024-11-07

## 2024-11-06 RX ORDER — CHLORHEXIDINE GLUCONATE ORAL RINSE 1.2 MG/ML
15 SOLUTION DENTAL 2 TIMES DAILY
Status: DISCONTINUED | OUTPATIENT
Start: 2024-11-06 | End: 2024-11-14 | Stop reason: HOSPADM

## 2024-11-06 RX ORDER — ETOMIDATE 2 MG/ML
20 INJECTION INTRAVENOUS ONCE
Status: COMPLETED | OUTPATIENT
Start: 2024-11-06 | End: 2024-11-06

## 2024-11-06 RX ORDER — ENOXAPARIN SODIUM 100 MG/ML
60 INJECTION SUBCUTANEOUS 2 TIMES DAILY
Status: DISCONTINUED | OUTPATIENT
Start: 2024-11-06 | End: 2024-11-14 | Stop reason: HOSPADM

## 2024-11-06 RX ORDER — IPRATROPIUM BROMIDE AND ALBUTEROL SULFATE 2.5; .5 MG/3ML; MG/3ML
1 SOLUTION RESPIRATORY (INHALATION)
Status: DISCONTINUED | OUTPATIENT
Start: 2024-11-06 | End: 2024-11-14 | Stop reason: HOSPADM

## 2024-11-06 RX ORDER — IOPAMIDOL 755 MG/ML
75 INJECTION, SOLUTION INTRAVASCULAR
Status: COMPLETED | OUTPATIENT
Start: 2024-11-06 | End: 2024-11-06

## 2024-11-06 RX ORDER — FUROSEMIDE 10 MG/ML
40 INJECTION INTRAMUSCULAR; INTRAVENOUS ONCE
Status: COMPLETED | OUTPATIENT
Start: 2024-11-06 | End: 2024-11-06

## 2024-11-06 RX ORDER — FENTANYL CITRATE-0.9 % NACL/PF 10 MCG/ML
25-200 PLASTIC BAG, INJECTION (ML) INTRAVENOUS CONTINUOUS
Status: DISCONTINUED | OUTPATIENT
Start: 2024-11-06 | End: 2024-11-08

## 2024-11-06 RX ORDER — PROPOFOL 10 MG/ML
5-50 INJECTION, EMULSION INTRAVENOUS CONTINUOUS
Status: DISCONTINUED | OUTPATIENT
Start: 2024-11-06 | End: 2024-11-06

## 2024-11-06 RX ORDER — HYDROCORTISONE SODIUM SUCCINATE 100 MG/2ML
25 INJECTION INTRAMUSCULAR; INTRAVENOUS DAILY
Status: COMPLETED | OUTPATIENT
Start: 2024-11-08 | End: 2024-11-08

## 2024-11-06 RX ORDER — ACETYLCYSTEINE 200 MG/ML
600 SOLUTION ORAL; RESPIRATORY (INHALATION)
Status: DISCONTINUED | OUTPATIENT
Start: 2024-11-06 | End: 2024-11-14 | Stop reason: HOSPADM

## 2024-11-06 RX ORDER — INSULIN GLARGINE 100 [IU]/ML
30 INJECTION, SOLUTION SUBCUTANEOUS 2 TIMES DAILY
Status: DISCONTINUED | OUTPATIENT
Start: 2024-11-06 | End: 2024-11-11

## 2024-11-06 RX ORDER — ETOMIDATE 2 MG/ML
INJECTION INTRAVENOUS
Status: COMPLETED
Start: 2024-11-06 | End: 2024-11-06

## 2024-11-06 RX ORDER — SUCCINYLCHOLINE CHLORIDE 20 MG/ML
INJECTION INTRAMUSCULAR; INTRAVENOUS
Status: COMPLETED
Start: 2024-11-06 | End: 2024-11-06

## 2024-11-06 RX ORDER — HYDROCORTISONE SODIUM SUCCINATE 100 MG/2ML
50 INJECTION INTRAMUSCULAR; INTRAVENOUS EVERY 12 HOURS
Status: COMPLETED | OUTPATIENT
Start: 2024-11-06 | End: 2024-11-06

## 2024-11-06 RX ORDER — GUAIFENESIN 400 MG/1
400 TABLET ORAL 3 TIMES DAILY
Status: DISCONTINUED | OUTPATIENT
Start: 2024-11-06 | End: 2024-11-14 | Stop reason: HOSPADM

## 2024-11-06 RX ORDER — HEPARIN 100 UNIT/ML
100 SYRINGE INTRAVENOUS PRN
Status: DISCONTINUED | OUTPATIENT
Start: 2024-11-06 | End: 2024-11-14 | Stop reason: HOSPADM

## 2024-11-06 RX ORDER — SUCCINYLCHOLINE CHLORIDE 20 MG/ML
150 INJECTION INTRAMUSCULAR; INTRAVENOUS ONCE
Status: COMPLETED | OUTPATIENT
Start: 2024-11-06 | End: 2024-11-06

## 2024-11-06 RX ADMIN — GABAPENTIN 300 MG: 300 CAPSULE ORAL at 13:17

## 2024-11-06 RX ADMIN — METHOCARBAMOL 1000 MG: 500 TABLET ORAL at 13:17

## 2024-11-06 RX ADMIN — FAMOTIDINE 20 MG: 10 INJECTION, SOLUTION INTRAVENOUS at 20:36

## 2024-11-06 RX ADMIN — HYDROCORTISONE SODIUM SUCCINATE 50 MG: 100 INJECTION, POWDER, FOR SOLUTION INTRAMUSCULAR; INTRAVENOUS at 18:06

## 2024-11-06 RX ADMIN — GUAIFENESIN 400 MG: 400 TABLET ORAL at 20:36

## 2024-11-06 RX ADMIN — CALCIUM GLUCONATE 2000 MG: 98 INJECTION, SOLUTION INTRAVENOUS at 22:07

## 2024-11-06 RX ADMIN — POTASSIUM PHOSPHATE, MONOBASIC AND POTASSIUM PHOSPHATE, DIBASIC 20 MMOL: 224; 236 INJECTION, SOLUTION, CONCENTRATE INTRAVENOUS at 22:05

## 2024-11-06 RX ADMIN — SODIUM CHLORIDE, PRESERVATIVE FREE 40 MG: 5 INJECTION INTRAVENOUS at 08:07

## 2024-11-06 RX ADMIN — IPRATROPIUM BROMIDE AND ALBUTEROL SULFATE 1 DOSE: 2.5; .5 SOLUTION RESPIRATORY (INHALATION) at 17:50

## 2024-11-06 RX ADMIN — INSULIN LISPRO 4 UNITS: 100 INJECTION, SOLUTION INTRAVENOUS; SUBCUTANEOUS at 05:39

## 2024-11-06 RX ADMIN — METHOCARBAMOL 1000 MG: 500 TABLET ORAL at 20:35

## 2024-11-06 RX ADMIN — ACETYLCYSTEINE 600 MG: 200 INHALANT RESPIRATORY (INHALATION) at 12:03

## 2024-11-06 RX ADMIN — ACETAMINOPHEN 650 MG: 650 SOLUTION ORAL at 08:08

## 2024-11-06 RX ADMIN — ACETAMINOPHEN 650 MG: 650 SOLUTION ORAL at 13:17

## 2024-11-06 RX ADMIN — HYDROMORPHONE HYDROCHLORIDE 1 MG: 1 INJECTION, SOLUTION INTRAMUSCULAR; INTRAVENOUS; SUBCUTANEOUS at 14:08

## 2024-11-06 RX ADMIN — GUAIFENESIN 400 MG: 400 TABLET ORAL at 13:17

## 2024-11-06 RX ADMIN — METHOCARBAMOL 1000 MG: 500 TABLET ORAL at 08:07

## 2024-11-06 RX ADMIN — GABAPENTIN 300 MG: 300 CAPSULE ORAL at 20:36

## 2024-11-06 RX ADMIN — IPRATROPIUM BROMIDE AND ALBUTEROL SULFATE 1 DOSE: 2.5; .5 SOLUTION RESPIRATORY (INHALATION) at 21:07

## 2024-11-06 RX ADMIN — GUAIFENESIN 400 MG: 400 TABLET ORAL at 08:17

## 2024-11-06 RX ADMIN — HEPARIN SODIUM 7500 UNITS: 5000 INJECTION INTRAVENOUS; SUBCUTANEOUS at 05:25

## 2024-11-06 RX ADMIN — OXYCODONE HYDROCHLORIDE 10 MG: 5 SOLUTION ORAL at 05:24

## 2024-11-06 RX ADMIN — METHOCARBAMOL 1000 MG: 500 TABLET ORAL at 18:07

## 2024-11-06 RX ADMIN — HYDROMORPHONE HYDROCHLORIDE 1 MG: 1 INJECTION, SOLUTION INTRAMUSCULAR; INTRAVENOUS; SUBCUTANEOUS at 10:43

## 2024-11-06 RX ADMIN — SODIUM CHLORIDE, PRESERVATIVE FREE 10 ML: 5 INJECTION INTRAVENOUS at 16:15

## 2024-11-06 RX ADMIN — OXYCODONE HYDROCHLORIDE 10 MG: 5 SOLUTION ORAL at 18:07

## 2024-11-06 RX ADMIN — CEFEPIME 2000 MG: 2 INJECTION, POWDER, FOR SOLUTION INTRAVENOUS at 08:27

## 2024-11-06 RX ADMIN — ETOMIDATE 20 MG: 2 INJECTION INTRAVENOUS at 17:24

## 2024-11-06 RX ADMIN — ENOXAPARIN SODIUM 60 MG: 100 INJECTION SUBCUTANEOUS at 13:21

## 2024-11-06 RX ADMIN — HYDROMORPHONE HYDROCHLORIDE 1 MG: 1 INJECTION, SOLUTION INTRAMUSCULAR; INTRAVENOUS; SUBCUTANEOUS at 16:15

## 2024-11-06 RX ADMIN — ALTEPLASE 1 MG: 2.2 INJECTION, POWDER, LYOPHILIZED, FOR SOLUTION INTRAVENOUS at 21:25

## 2024-11-06 RX ADMIN — Medication 250 MG: at 18:07

## 2024-11-06 RX ADMIN — INSULIN GLARGINE 30 UNITS: 100 INJECTION, SOLUTION SUBCUTANEOUS at 20:37

## 2024-11-06 RX ADMIN — DEXMEDETOMIDINE HYDROCHLORIDE 0.2 MCG/KG/HR: 100 INJECTION, SOLUTION, CONCENTRATE INTRAVENOUS at 23:29

## 2024-11-06 RX ADMIN — ACETAMINOPHEN 650 MG: 650 SOLUTION ORAL at 01:24

## 2024-11-06 RX ADMIN — HYDROCORTISONE SODIUM SUCCINATE 50 MG: 100 INJECTION, POWDER, FOR SOLUTION INTRAMUSCULAR; INTRAVENOUS at 05:25

## 2024-11-06 RX ADMIN — BACITRACIN ZINC: 500 OINTMENT TOPICAL at 08:07

## 2024-11-06 RX ADMIN — OXYCODONE HYDROCHLORIDE 10 MG: 5 SOLUTION ORAL at 13:17

## 2024-11-06 RX ADMIN — SODIUM CHLORIDE, PRESERVATIVE FREE 40 MG: 5 INJECTION INTRAVENOUS at 20:46

## 2024-11-06 RX ADMIN — INSULIN LISPRO 4 UNITS: 100 INJECTION, SOLUTION INTRAVENOUS; SUBCUTANEOUS at 08:12

## 2024-11-06 RX ADMIN — FUROSEMIDE 40 MG: 10 INJECTION, SOLUTION INTRAMUSCULAR; INTRAVENOUS at 08:18

## 2024-11-06 RX ADMIN — POTASSIUM BICARBONATE 40 MEQ: 782 TABLET, EFFERVESCENT ORAL at 20:35

## 2024-11-06 RX ADMIN — HYDROMORPHONE HYDROCHLORIDE 1 MG: 1 INJECTION, SOLUTION INTRAMUSCULAR; INTRAVENOUS; SUBCUTANEOUS at 01:23

## 2024-11-06 RX ADMIN — Medication 250 MG: at 13:17

## 2024-11-06 RX ADMIN — PROPOFOL 30 MCG/KG/MIN: 10 INJECTION, EMULSION INTRAVENOUS at 20:35

## 2024-11-06 RX ADMIN — IPRATROPIUM BROMIDE AND ALBUTEROL SULFATE 1 DOSE: 2.5; .5 SOLUTION RESPIRATORY (INHALATION) at 12:03

## 2024-11-06 RX ADMIN — ACETYLCYSTEINE 600 MG: 200 INHALANT RESPIRATORY (INHALATION) at 21:07

## 2024-11-06 RX ADMIN — ACETAMINOPHEN 650 MG: 650 SOLUTION ORAL at 20:36

## 2024-11-06 RX ADMIN — DEXTROSE MONOHYDRATE: 50 INJECTION, SOLUTION INTRAVENOUS at 02:48

## 2024-11-06 RX ADMIN — Medication 250 MG: at 20:35

## 2024-11-06 RX ADMIN — CALCIUM GLUCONATE 2000 MG: 98 INJECTION, SOLUTION INTRAVENOUS at 10:34

## 2024-11-06 RX ADMIN — PROPOFOL 20 MCG/KG/MIN: 10 INJECTION, EMULSION INTRAVENOUS at 17:29

## 2024-11-06 RX ADMIN — HYDROMORPHONE HYDROCHLORIDE 1 MG: 1 INJECTION, SOLUTION INTRAMUSCULAR; INTRAVENOUS; SUBCUTANEOUS at 03:34

## 2024-11-06 RX ADMIN — CHLORHEXIDINE GLUCONATE, 0.12% ORAL RINSE 15 ML: 1.2 SOLUTION DENTAL at 20:37

## 2024-11-06 RX ADMIN — POTASSIUM BICARBONATE 40 MEQ: 782 TABLET, EFFERVESCENT ORAL at 08:28

## 2024-11-06 RX ADMIN — SUCCINYLCHOLINE CHLORIDE 150 MG: 20 INJECTION INTRAMUSCULAR; INTRAVENOUS at 17:25

## 2024-11-06 RX ADMIN — Medication 50 MCG/HR: at 17:39

## 2024-11-06 RX ADMIN — GABAPENTIN 300 MG: 300 CAPSULE ORAL at 08:08

## 2024-11-06 RX ADMIN — ENOXAPARIN SODIUM 60 MG: 100 INJECTION SUBCUTANEOUS at 20:37

## 2024-11-06 RX ADMIN — HYDROMORPHONE HYDROCHLORIDE 1 MG: 1 INJECTION, SOLUTION INTRAMUSCULAR; INTRAVENOUS; SUBCUTANEOUS at 08:08

## 2024-11-06 RX ADMIN — CHLORHEXIDINE GLUCONATE, 0.12% ORAL RINSE 15 ML: 1.2 SOLUTION DENTAL at 08:08

## 2024-11-06 RX ADMIN — IOPAMIDOL 75 ML: 755 INJECTION, SOLUTION INTRAVENOUS at 20:02

## 2024-11-06 RX ADMIN — Medication 250 MG: at 08:28

## 2024-11-06 RX ADMIN — INSULIN GLARGINE 30 UNITS: 100 INJECTION, SOLUTION SUBCUTANEOUS at 08:16

## 2024-11-06 RX ADMIN — SUCCINYLCHOLINE CHLORIDE 150 MG: 20 INJECTION, SOLUTION INTRAMUSCULAR; INTRAVENOUS at 17:25

## 2024-11-06 RX ADMIN — CEFEPIME 2000 MG: 2 INJECTION, POWDER, FOR SOLUTION INTRAVENOUS at 18:00

## 2024-11-06 ASSESSMENT — PULMONARY FUNCTION TESTS
PIF_VALUE: 27
PIF_VALUE: 28
PIF_VALUE: 28
PIF_VALUE: 29
PIF_VALUE: 18
PIF_VALUE: 19
PIF_VALUE: 22
PIF_VALUE: 17
PIF_VALUE: 22
PIF_VALUE: 17
PIF_VALUE: 5
PIF_VALUE: 22

## 2024-11-06 ASSESSMENT — PAIN DESCRIPTION - ORIENTATION
ORIENTATION: RIGHT;LEFT;ANTERIOR;POSTERIOR
ORIENTATION: LEFT;RIGHT
ORIENTATION: RIGHT;LEFT;ANTERIOR;POSTERIOR
ORIENTATION: LEFT
ORIENTATION: LEFT
ORIENTATION: RIGHT;LEFT;ANTERIOR;POSTERIOR
ORIENTATION: LEFT

## 2024-11-06 ASSESSMENT — PAIN DESCRIPTION - LOCATION
LOCATION: LEG
LOCATION: GENERALIZED
LOCATION: LEG;ARM
LOCATION: LEG
LOCATION: LEG
LOCATION: GENERALIZED

## 2024-11-06 ASSESSMENT — PAIN DESCRIPTION - DESCRIPTORS
DESCRIPTORS: DISCOMFORT;SHARP;SORE
DESCRIPTORS: ACHING;DISCOMFORT;SORE
DESCRIPTORS: ACHING;DISCOMFORT;SORE
DESCRIPTORS: SORE
DESCRIPTORS: SORE;TENDER
DESCRIPTORS: ACHING;DISCOMFORT;SORE
DESCRIPTORS: ACHING;SORE;TENDER
DESCRIPTORS: ACHING;SORE;TENDER

## 2024-11-06 ASSESSMENT — PAIN SCALES - GENERAL
PAINLEVEL_OUTOF10: 8
PAINLEVEL_OUTOF10: 4
PAINLEVEL_OUTOF10: 7
PAINLEVEL_OUTOF10: 4
PAINLEVEL_OUTOF10: 4
PAINLEVEL_OUTOF10: 8
PAINLEVEL_OUTOF10: 6
PAINLEVEL_OUTOF10: 4
PAINLEVEL_OUTOF10: 3
PAINLEVEL_OUTOF10: 4
PAINLEVEL_OUTOF10: 6
PAINLEVEL_OUTOF10: 2
PAINLEVEL_OUTOF10: 5
PAINLEVEL_OUTOF10: 8
PAINLEVEL_OUTOF10: 6
PAINLEVEL_OUTOF10: 4
PAINLEVEL_OUTOF10: 7
PAINLEVEL_OUTOF10: 4
PAINLEVEL_OUTOF10: 6
PAINLEVEL_OUTOF10: 8
PAINLEVEL_OUTOF10: 6
PAINLEVEL_OUTOF10: 4

## 2024-11-06 ASSESSMENT — PAIN DESCRIPTION - FREQUENCY
FREQUENCY: CONTINUOUS
FREQUENCY: CONTINUOUS

## 2024-11-06 ASSESSMENT — PAIN DESCRIPTION - ONSET
ONSET: ON-GOING
ONSET: ON-GOING

## 2024-11-06 ASSESSMENT — PAIN - FUNCTIONAL ASSESSMENT
PAIN_FUNCTIONAL_ASSESSMENT: ACTIVITIES ARE NOT PREVENTED
PAIN_FUNCTIONAL_ASSESSMENT: PREVENTS OR INTERFERES SOME ACTIVE ACTIVITIES AND ADLS
PAIN_FUNCTIONAL_ASSESSMENT: ACTIVITIES ARE NOT PREVENTED
PAIN_FUNCTIONAL_ASSESSMENT: PREVENTS OR INTERFERES SOME ACTIVE ACTIVITIES AND ADLS
PAIN_FUNCTIONAL_ASSESSMENT: ACTIVITIES ARE NOT PREVENTED
PAIN_FUNCTIONAL_ASSESSMENT: PREVENTS OR INTERFERES WITH ALL ACTIVE AND SOME PASSIVE ACTIVITIES
PAIN_FUNCTIONAL_ASSESSMENT: PREVENTS OR INTERFERES SOME ACTIVE ACTIVITIES AND ADLS
PAIN_FUNCTIONAL_ASSESSMENT: PREVENTS OR INTERFERES SOME ACTIVE ACTIVITIES AND ADLS

## 2024-11-06 ASSESSMENT — PAIN DESCRIPTION - PAIN TYPE
TYPE: ACUTE PAIN;SURGICAL PAIN
TYPE: CHRONIC PAIN;ACUTE PAIN

## 2024-11-06 NOTE — PLAN OF CARE
Problem: Discharge Planning  Goal: Discharge to home or other facility with appropriate resources  11/6/2024 0957 by Idalia Wilson RN  Outcome: Progressing     Problem: Pain  Goal: Verbalizes/displays adequate comfort level or baseline comfort level  11/6/2024 0957 by Idalia Wilson RN  Outcome: Progressing     Problem: Safety - Adult  Goal: Free from fall injury  11/6/2024 0957 by Idalia Wilson RN  Outcome: Progressing     Problem: Skin/Tissue Integrity  Goal: Absence of new skin breakdown  Description: 1.  Monitor for areas of redness and/or skin breakdown  2.  Assess vascular access sites hourly  3.  Every 4-6 hours minimum:  Change oxygen saturation probe site  4.  Every 4-6 hours:  If on nasal continuous positive airway pressure, respiratory therapy assess nares and determine need for appliance change or resting period.  11/6/2024 0957 by Idalia Wilson RN  Outcome: Progressing     Problem: ABCDS Injury Assessment  Goal: Absence of physical injury  11/6/2024 0957 by Idalia Wilson RN  Outcome: Progressing     Problem: Chronic Conditions and Co-morbidities  Goal: Patient's chronic conditions and co-morbidity symptoms are monitored and maintained or improved  11/6/2024 0957 by Idalia Wilson RN  Outcome: Progressing     Problem: Respiratory - Adult  Goal: Achieves optimal ventilation and oxygenation  11/6/2024 0957 by Idalia Wilson RN  Outcome: Progressing

## 2024-11-06 NOTE — PROGRESS NOTES
11/05/24 1956   NIV Type   NIV Started/Stopped On   Equipment Type V60   Mode Bilevel   Mask Type Full face mask   Mask Size Medium   Assessment   Pulse 94   Respirations 22   SpO2 100 %   Comfort Level Good   Using Accessory Muscles No   Mask Compliance Good   Skin Assessment Clean, dry, & intact   Skin Protection for O2 Device Yes   Orientation Middle   Location Nose   Intervention(s) Skin Barrier   Settings/Measurements   PIP Observed 17 cm H20   IPAP 16 cmH20   CPAP/EPAP 8 cmH2O   Vt (Measured) 711 mL   Rate Ordered 14   Insp Rise Time (%) 3 %   FiO2  60 %   I Time/ I Time % 0.8 s   Minute Volume (L/min) 17.6 Liters   Mask Leak (lpm) 40 lpm   Patient's Home Machine No   Alarm Settings   Alarms On Y   Low Pressure (cmH2O) 8 cmH2O   High Pressure (cmH2O) 30 cmH2O   Apnea (secs) 20 secs   RR Low (bpm) 10   RR High (bpm) 35 br/min     Date: 11/5/2024    Time: 8:00 PM    Patient Placed On BIPAP/CPAP/ Non-Invasive Ventilation? No  Facial area red/color change? No     If YES are Blister/Lesion present? No     BIPAP/CPAP skin barrier? Yes   Skin barrier type:mepilexlite     Comments: Remains on from previous shift    Jing Coulter RCP RRT-ACCS

## 2024-11-06 NOTE — PROGRESS NOTES
11/06/24 0021   NIV Type   $NIV $Daily Charge   NIV Started/Stopped On   Equipment Type V60   Mode Bilevel   Mask Type Full face mask   Mask Size Medium   Assessment   Pulse (!) 109   Respirations 17   SpO2 100 %   Comfort Level Good   Using Accessory Muscles No   Mask Compliance Good   Skin Assessment Clean, dry, & intact   Skin Protection for O2 Device Yes   Orientation Middle   Location Nose   Intervention(s) Skin Barrier   Settings/Measurements   PIP Observed 16 cm H20   IPAP 16 cmH20   CPAP/EPAP 8 cmH2O   Vt (Measured) 691 mL   Rate Ordered 14   Insp Rise Time (%) 3 %   FiO2  60 %   I Time/ I Time % 0.8 s   Minute Volume (L/min) 14.9 Liters   Mask Leak (lpm) 49 lpm   Patient's Home Machine No   Alarm Settings   Alarms On Y   Low Pressure (cmH2O) 8 cmH2O   High Pressure (cmH2O) 30 cmH2O   Apnea (secs) 20 secs   RR Low (bpm) 10   RR High (bpm) 35 br/min     Date: 11/6/2024    Time: 12:22 AM    Patient Placed On BIPAP/CPAP/ Non-Invasive Ventilation? No  Facial area red/color change? No     If YES are Blister/Lesion present? No    BIPAP/CPAP skin barrier? Yes   Skin barrier type:mepilexlite     Comments: remains on     Jing Coulter RCP RRT-ACCS
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
## 2024-11-06 NOTE — PROGRESS NOTES
Speech Language Pathology  NAME:  Glenroy Simmons II  :  1975  DATE: 2024  ROOM:  3806/3806-A    Pt unavailable at 1455 for Clinical Swallow Evaluation Discussed with patient nurse in person     REASON:  PT aphonic with cough upon slp entering the room.   A bedside swallow evaluation is not recommended today due to high risk of aspiration.    Will continue to follow.    Will re-attempt as appropriate.

## 2024-11-06 NOTE — PROGRESS NOTES
Department of Orthopedic Surgery  Resident Progress Note    Patient seen and examined at bedside today.  He is alert this morning.  His pain is reasonably controlled at this point.  Communication is difficult due to his BiPAP.    VITALS:  BP (!) 144/72   Pulse (!) 117   Temp 100 °F (37.8 °C) (Bladder)   Resp 15   Ht 1.854 m (6' 1\")   Wt (!) 159.7 kg (352 lb)   SpO2 100%   BMI 46.44 kg/m²     General: Awake, alert, follows commands, difficult to understand      Musculoskeletal:     Left Lower Extremity    Dressing clean dry and intact knee immobilizer in place   Compartments are soft and compressible  Negligible dorsiflexion and plantarflexion of the great toe, sensation difficult to assess due to communication issues however he does appear to have some sensation to the foot  +2/4 DP & PT pulses, Brisk Cap refill, Toes warm and perfused  Foot is warm and well-perfused      Right Lower Extremity    Aquacel in place small saturation well-contained within Aquacel  Compartments soft and compressible  Palpable dorsalis pedis and posterior tibialis pulse, brisk cap refill to toes, foot warm and perfused  Foot warm and well-perfused  Slight motion at the ankle, no dorsiflexion or plantarflexion demonstrated to the great toe      CBC:   Lab Results   Component Value Date/Time    WBC 23.1 11/06/2024 03:30 AM    HGB 8.5 11/06/2024 03:30 AM    HCT 27.9 11/06/2024 03:30 AM     11/06/2024 03:30 AM     PT/INR:    Lab Results   Component Value Date/Time    PROTIME 16.1 10/24/2024 06:17 PM    INR 1.5 10/24/2024 06:17 PM       ASSESSMENT  S/P right posterior column open reduction total fixation - 10/30/24  Left periprosthetic posterior wall posterior column fracture  Left greater trochanteric fracture  Status post left tibial plateau fracture open reduction internal fixation 11/3/24  Bilateral sciatic nerve injuries, as he continues to recover we will have a better understanding of the degree of his injuries.      PLAN

## 2024-11-06 NOTE — PROCEDURES
PROCEDURE NOTE  Date: 11/6/2024   Name: Glenroy Simmons II  YOB: 1975    Intubation    Date/Time: 11/6/2024 5:34 PM    Performed by: Annelise Pedroza DO  Authorized by: Annelise Pedroza DO  Consent: The procedure was performed in an emergent situation. Verbal consent obtained.  Risks and benefits: risks, benefits and alternatives were discussed  Consent given by: patient  Patient understanding: patient states understanding of the procedure being performed  Patient identity confirmed: verbally with patient and arm band  Time out: Immediately prior to procedure a \"time out\" was called to verify the correct patient, procedure, equipment, support staff and site/side marked as required.  Indications: respiratory distress, respiratory failure and hypoxemia  Intubation method: fiberoptic oral  Patient status: paralyzed (RSI)  Preoxygenation: BVM  Sedatives: etomidate  Paralytic: succinylcholine  Laryngoscope size: Mac 4  Tube size: 8.0 mm  Tube type: cuffed  Number of attempts: 1  Cricoid pressure: no  Cords visualized: yes  Post-procedure assessment: chest rise and ETCO2 monitor  Breath sounds: equal  Cuff inflated: yes  ETT to lip: 24 cm  Tube secured with: ETT rebolledo and adhesive tape  Chest x-ray interpreted by me.  Chest x-ray findings: endotracheal tube in appropriate position  Patient tolerance: patient tolerated the procedure well with no immediate complications  Comments: Dr. Marin was present for the procedure.
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
## 2024-11-06 NOTE — PROGRESS NOTES
[facility]  MD Laron Muller MD Ali Hassan, MD Lisa Kniska, CNP   Lo Elmore, BLAIRE Davidson, CNP  Progress note   Patient's Name: Glenroy Simmons II  12:59 PM  11/6/2024        10/26 : seen in his room intubated mechanically ventilated fio2 90 peep 10 massively hyperovlemic , UO ok . No pressors .     10/27 seen in his room remain critically ill , mechanically ventilated no pressors .   We did try SLED yesterday and we could not achieve a good ultrafiltration due to lower blood pressure ,we did start him on a Bumex drip today and has been having excellent urine output in the range of 2  an hour per ICU nursing his azotemia is actually slightly better .  His oxygen requirement are higher and better ICU the plan is to do a CT scan    10/28: Seen in the SICU. Critically ill. Mechanically ventilated, FiO2 70 %. Receiving 1 unit PRBCs. He does open his eyes to voice. Remains hypervolemic. Urine output is excellent on Bumex drip. Not on any pressors.     10/29: Seen in the SICU. Remains critically ill. Mechanically ventilated via ETT. FiO2 90 %, PEEP 10. He does open his eyes to voice. Excellent urine output on Bumex drip, over 9 liters yesterday. Hypervolemia is improving. Blood pressure is stable.     10/30:  Remains critically ill.  Mechanically ventilated-->FiO2- 80% peep-10.  Continues on Bumex, Fentanyl, Levo and LR.  Bilateral chest tubes.  Going to OR with ortho.     10/31: Seen in the ICU. Critically ill. Mechanically ventilated. FiO2 75 %. He does open is eyes to voice.  Excellent urine output with Bumex drip. Chest tubes removed today.     11/1: Seen in the SICU. Mechanically ventilated and sedated. FiO2 is 70 %. He does open his eyes to voice. Rate of Bumex drip decreased today. Urine output remains excellent. Hypervolemia improving. Tolerating his tube feedings without issues. Brother is present at the bedside.     11/2: Seen in the ICU.  Remains

## 2024-11-06 NOTE — PLAN OF CARE
Problem: Pain  Goal: Verbalizes/displays adequate comfort level or baseline comfort level  11/6/2024 1831 by Miranda Bernal RN  Outcome: Progressing  11/6/2024 0957 by Idalia Wilson RN  Outcome: Progressing     Problem: Safety - Adult  Goal: Free from fall injury  11/6/2024 1831 by Miranda Bernal RN  Outcome: Progressing  11/6/2024 0957 by Idalia Wilson RN  Outcome: Progressing     Problem: ABCDS Injury Assessment  Goal: Absence of physical injury  11/6/2024 1831 by Miranda Bernal RN  Outcome: Progressing  11/6/2024 0957 by Idalia Wilson RN  Outcome: Progressing     Problem: Safety - Medical Restraint  Goal: Remains free of injury from restraints (Restraint for Interference with Medical Device)  Description: INTERVENTIONS:  1. Determine that other, less restrictive measures have been tried or would not be effective before applying the restraint  2. Evaluate the patient's condition at the time of restraint application  3. Inform patient/family regarding the reason for restraint  4. Q2H: Monitor safety, psychosocial status, comfort, nutrition and hydration  11/6/2024 1831 by Miranda Bernal RN  Outcome: Progressing  11/6/2024 0957 by Idalia Wilson RN  Outcome: Progressing     Problem: Neurosensory - Adult  Goal: Achieves stable or improved neurological status  11/6/2024 1831 by Miranda Bernal RN  Outcome: Progressing  11/6/2024 0957 by Idalia Wilson RN  Outcome: Progressing     Problem: Neurosensory - Adult  Goal: Achieves maximal functionality and self care  11/6/2024 1831 by Miranda Bernal RN  Outcome: Not Progressing  11/6/2024 0957 by Idalia Wilson RN  Outcome: Progressing     Problem: Respiratory - Adult  Goal: Achieves optimal ventilation and oxygenation  11/6/2024 1831 by Miranda Bernal RN  Outcome: Not Progressing  11/6/2024 0957 by Idalia Wilson RN  Outcome: Progressing     Problem: Cardiovascular - Adult  Goal: Maintains optimal cardiac output and hemodynamic stability  11/6/2024 1831

## 2024-11-06 NOTE — PLAN OF CARE
Problem: Discharge Planning  Goal: Discharge to home or other facility with appropriate resources  11/5/2024 2142 by Pam Walter RN  Outcome: Progressing  11/5/2024 1041 by Krista Ceja RN  Outcome: Not Progressing     Problem: Pain  Goal: Verbalizes/displays adequate comfort level or baseline comfort level  11/5/2024 2142 by Pam Walter RN  Outcome: Progressing  11/5/2024 1041 by Krista Ceja RN  Outcome: Progressing     Problem: Safety - Adult  Goal: Free from fall injury  11/5/2024 2142 by Pam Walter RN  Outcome: Progressing  11/5/2024 1041 by Krista Ceja RN  Outcome: Progressing     Problem: Skin/Tissue Integrity  Goal: Absence of new skin breakdown  Description: 1.  Monitor for areas of redness and/or skin breakdown  2.  Assess vascular access sites hourly  3.  Every 4-6 hours minimum:  Change oxygen saturation probe site  4.  Every 4-6 hours:  If on nasal continuous positive airway pressure, respiratory therapy assess nares and determine need for appliance change or resting period.  11/5/2024 2142 by Pam Walter RN  Outcome: Progressing  11/5/2024 1041 by Krista Ceja RN  Outcome: Progressing     Problem: ABCDS Injury Assessment  Goal: Absence of physical injury  11/5/2024 2142 by Pam Walter RN  Outcome: Progressing  11/5/2024 1041 by Krista Ceja RN  Outcome: Progressing     Problem: Safety - Medical Restraint  Goal: Remains free of injury from restraints (Restraint for Interference with Medical Device)  Description: INTERVENTIONS:  1. Determine that other, less restrictive measures have been tried or would not be effective before applying the restraint  2. Evaluate the patient's condition at the time of restraint application  3. Inform patient/family regarding the reason for restraint  4. Q2H: Monitor safety, psychosocial status, comfort, nutrition and hydration  11/5/2024 2142 by Pam Walter RN  Outcome: Progressing  11/5/2024 1041 by Krista Ceja RN  Outcome: Progressing    spiritual values  2. Provide emotional support, including active listening and acknowledgement of concerns of patient and caregivers  3. Reduce environmental stimuli, as able  4. Instruct patient/family in relaxation techniques, as appropriate  5. Assess for spiritual pain/suffering and initiate Spiritual Care, Psychosocial Clinical Specialist consults as needed  11/5/2024 2142 by Pam Walter, RN  Outcome: Progressing  11/5/2024 1041 by Krista Ceja, RN  Outcome: Progressing     Problem: Confusion  Goal: Confusion, delirium, dementia, or psychosis is improved or at baseline  Description: INTERVENTIONS:  1. Assess for possible contributors to thought disturbance, including medications, impaired vision or hearing, underlying metabolic abnormalities, dehydration, psychiatric diagnoses, and notify attending LIP  2. [place] high risk fall precautions, as indicated  3. Provide frequent short contacts to provide reality reorientation, refocusing and direction  4. Decrease environmental stimuli, including noise as appropriate  5. Monitor and intervene to maintain adequate nutrition, hydration, elimination, sleep and activity  6. If unable to ensure safety without constant attention obtain sitter and review sitter guidelines with assigned personnel  7. Initiate Psychosocial CNS and Spiritual Care consult, as indicated  Outcome: Progressing     Problem: Behavior  Goal: Pt/Family maintain appropriate behavior and adhere to behavioral management agreement, if implemented  Description: INTERVENTIONS:  1. Assess patient/family's coping skills and  non-compliant behavior (including use of illegal substances)  2. Notify security of behavior or suspected illegal substances which indicate the need for search of the family and/or belongings  3. Encourage verbalization of thoughts and concerns in a socially appropriate manner  4. Utilize positive, consistent limit setting strategies supporting safety of patient, staff and

## 2024-11-06 NOTE — PROGRESS NOTES
When patient was turned for care, he began to have sustained Oxygen desaturation of 85%. High flow oxygen rate increased from 8 Liters to 15 Liters with no relief. Patient placed on Bipap mask, Oxygen saturation sustaining 85-89%. Patient complaining of increased discomfort. Respiratory therapist notified. Dr. Pedroza at bedside. ABG collected, order obtained for Chest Xray. No further orders at this time.
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
## 2024-11-07 ENCOUNTER — APPOINTMENT (OUTPATIENT)
Dept: GENERAL RADIOLOGY | Age: 49
End: 2024-11-07
Payer: MEDICARE

## 2024-11-07 LAB
AADO2: 439.5 MMHG
ALBUMIN SERPL-MCNC: 2.4 G/DL (ref 3.5–5.2)
ALBUMIN SERPL-MCNC: 3.2 G/DL (ref 3.5–5.2)
ALP SERPL-CCNC: 218 U/L (ref 40–129)
ALP SERPL-CCNC: 229 U/L (ref 40–129)
ALT SERPL-CCNC: 74 U/L (ref 0–40)
ALT SERPL-CCNC: 86 U/L (ref 0–40)
ANION GAP SERPL CALCULATED.3IONS-SCNC: 10 MMOL/L (ref 7–16)
ANION GAP SERPL CALCULATED.3IONS-SCNC: 4 MMOL/L (ref 7–16)
AST SERPL-CCNC: 58 U/L (ref 0–39)
AST SERPL-CCNC: 73 U/L (ref 0–39)
B.E.: 4 MMOL/L (ref -3–3)
BASOPHILS # BLD: 0 K/UL (ref 0–0.2)
BASOPHILS # BLD: 0.04 K/UL (ref 0–0.2)
BASOPHILS NFR BLD: 0 % (ref 0–2)
BASOPHILS NFR BLD: 0 % (ref 0–2)
BILIRUB SERPL-MCNC: 1.1 MG/DL (ref 0–1.2)
BILIRUB SERPL-MCNC: 1.4 MG/DL (ref 0–1.2)
BUN SERPL-MCNC: 39 MG/DL (ref 6–20)
BUN SERPL-MCNC: 41 MG/DL (ref 6–20)
CA-I BLD-SCNC: 1.1 MMOL/L (ref 1.15–1.33)
CA-I BLD-SCNC: 1.13 MMOL/L (ref 1.15–1.33)
CALCIUM SERPL-MCNC: 7.7 MG/DL (ref 8.6–10.2)
CALCIUM SERPL-MCNC: 8 MG/DL (ref 8.6–10.2)
CHLORIDE SERPL-SCNC: 110 MMOL/L (ref 98–107)
CHLORIDE SERPL-SCNC: 114 MMOL/L (ref 98–107)
CK SERPL-CCNC: 528 U/L (ref 20–200)
CO2 SERPL-SCNC: 27 MMOL/L (ref 22–29)
CO2 SERPL-SCNC: 31 MMOL/L (ref 22–29)
COHB: 0.9 % (ref 0–1.5)
CREAT SERPL-MCNC: 1 MG/DL (ref 0.7–1.2)
CREAT SERPL-MCNC: 1 MG/DL (ref 0.7–1.2)
CRITICAL: ABNORMAL
DATE ANALYZED: ABNORMAL
DATE OF COLLECTION: ABNORMAL
EOSINOPHIL # BLD: 0 K/UL (ref 0.05–0.5)
EOSINOPHIL # BLD: 0.3 K/UL (ref 0.05–0.5)
EOSINOPHILS RELATIVE PERCENT: 0 % (ref 0–6)
EOSINOPHILS RELATIVE PERCENT: 1 % (ref 0–6)
ERYTHROCYTE [DISTWIDTH] IN BLOOD BY AUTOMATED COUNT: 19.1 % (ref 11.5–15)
ERYTHROCYTE [DISTWIDTH] IN BLOOD BY AUTOMATED COUNT: 19.3 % (ref 11.5–15)
FIO2: 80 %
GFR, ESTIMATED: 89 ML/MIN/1.73M2
GFR, ESTIMATED: >90 ML/MIN/1.73M2
GLUCOSE BLD-MCNC: 103 MG/DL (ref 74–99)
GLUCOSE BLD-MCNC: 108 MG/DL (ref 74–99)
GLUCOSE BLD-MCNC: 122 MG/DL (ref 74–99)
GLUCOSE BLD-MCNC: 135 MG/DL (ref 74–99)
GLUCOSE BLD-MCNC: 138 MG/DL (ref 74–99)
GLUCOSE BLD-MCNC: 159 MG/DL (ref 74–99)
GLUCOSE SERPL-MCNC: 137 MG/DL (ref 74–99)
GLUCOSE SERPL-MCNC: 156 MG/DL (ref 74–99)
HCO3: 28 MMOL/L (ref 22–26)
HCT VFR BLD AUTO: 25 % (ref 37–54)
HCT VFR BLD AUTO: 26.7 % (ref 37–54)
HGB BLD-MCNC: 7.4 G/DL (ref 12.5–16.5)
HGB BLD-MCNC: 7.9 G/DL (ref 12.5–16.5)
HHB: 3.1 % (ref 0–5)
IMM GRANULOCYTES # BLD AUTO: 0.26 K/UL (ref 0–0.58)
IMM GRANULOCYTES NFR BLD: 1 % (ref 0–5)
LAB: ABNORMAL
LACTATE BLDV-SCNC: 1.2 MMOL/L (ref 0.5–2.2)
LYMPHOCYTES NFR BLD: 1.06 K/UL (ref 1.5–4)
LYMPHOCYTES NFR BLD: 1.63 K/UL (ref 1.5–4)
LYMPHOCYTES RELATIVE PERCENT: 4 % (ref 20–42)
LYMPHOCYTES RELATIVE PERCENT: 8 % (ref 20–42)
Lab: 457
MAGNESIUM SERPL-MCNC: 2.2 MG/DL (ref 1.6–2.6)
MAGNESIUM SERPL-MCNC: 3 MG/DL (ref 1.6–2.6)
MCH RBC QN AUTO: 29 PG (ref 26–35)
MCH RBC QN AUTO: 29 PG (ref 26–35)
MCHC RBC AUTO-ENTMCNC: 29.6 G/DL (ref 32–34.5)
MCHC RBC AUTO-ENTMCNC: 29.6 G/DL (ref 32–34.5)
MCV RBC AUTO: 98 FL (ref 80–99.9)
MCV RBC AUTO: 98.2 FL (ref 80–99.9)
METHB: 0.3 % (ref 0–1.5)
MODE: AC
MONOCYTES NFR BLD: 1.25 K/UL (ref 0.1–0.95)
MONOCYTES NFR BLD: 1.28 K/UL (ref 0.1–0.95)
MONOCYTES NFR BLD: 5 % (ref 2–12)
MONOCYTES NFR BLD: 6 % (ref 2–12)
NEUTROPHILS NFR BLD: 84 % (ref 43–80)
NEUTROPHILS NFR BLD: 91 % (ref 43–80)
NEUTS SEG NFR BLD: 18.27 K/UL (ref 1.8–7.3)
NEUTS SEG NFR BLD: 22.36 K/UL (ref 1.8–7.3)
NUCLEATED RED BLOOD CELLS: 1 PER 100 WBC
O2 SATURATION: 96.9 % (ref 92–98.5)
O2HB: 95.7 % (ref 94–97)
OPERATOR ID: 421
PATIENT TEMP: 37 C
PCO2: 39.6 MMHG (ref 35–45)
PEEP/CPAP: 8 CMH2O
PFO2: 1.12 MMHG/%
PH BLOOD GAS: 7.47 (ref 7.35–7.45)
PHOSPHATE SERPL-MCNC: 3.2 MG/DL (ref 2.5–4.5)
PHOSPHATE SERPL-MCNC: 3.6 MG/DL (ref 2.5–4.5)
PLATELET # BLD AUTO: 523 K/UL (ref 130–450)
PLATELET # BLD AUTO: 597 K/UL (ref 130–450)
PMV BLD AUTO: 10.7 FL (ref 7–12)
PMV BLD AUTO: 10.9 FL (ref 7–12)
PO2: 89.3 MMHG (ref 75–100)
POTASSIUM SERPL-SCNC: 2.8 MMOL/L (ref 3.5–5)
POTASSIUM SERPL-SCNC: 3.3 MMOL/L (ref 3.5–5)
PROCALCITONIN SERPL-MCNC: 0.63 NG/ML (ref 0–0.08)
PROT SERPL-MCNC: 4.8 G/DL (ref 6.4–8.3)
PROT SERPL-MCNC: 5.9 G/DL (ref 6.4–8.3)
RBC # BLD AUTO: 2.55 M/UL (ref 3.8–5.8)
RBC # BLD AUTO: 2.72 M/UL (ref 3.8–5.8)
RBC # BLD: ABNORMAL 10*6/UL
RI(T): 4.92
RR MECHANICAL: 16 B/MIN
SODIUM SERPL-SCNC: 147 MMOL/L (ref 132–146)
SODIUM SERPL-SCNC: 149 MMOL/L (ref 132–146)
SODIUM SERPL-SCNC: 149 MMOL/L (ref 132–146)
SODIUM SERPL-SCNC: 151 MMOL/L (ref 132–146)
SOURCE, BLOOD GAS: ABNORMAL
THB: 8.5 G/DL (ref 11.5–16.5)
TIME ANALYZED: 500
VT MECHANICAL: 500 ML
WBC OTHER # BLD: 21.8 K/UL (ref 4.5–11.5)
WBC OTHER # BLD: 24.7 K/UL (ref 4.5–11.5)

## 2024-11-07 PROCEDURE — 6370000000 HC RX 637 (ALT 250 FOR IP)

## 2024-11-07 PROCEDURE — 36592 COLLECT BLOOD FROM PICC: CPT

## 2024-11-07 PROCEDURE — 84145 PROCALCITONIN (PCT): CPT

## 2024-11-07 PROCEDURE — 94003 VENT MGMT INPAT SUBQ DAY: CPT

## 2024-11-07 PROCEDURE — 84295 ASSAY OF SERUM SODIUM: CPT

## 2024-11-07 PROCEDURE — 2580000003 HC RX 258

## 2024-11-07 PROCEDURE — 82962 GLUCOSE BLOOD TEST: CPT

## 2024-11-07 PROCEDURE — 2500000003 HC RX 250 WO HCPCS

## 2024-11-07 PROCEDURE — 84100 ASSAY OF PHOSPHORUS: CPT

## 2024-11-07 PROCEDURE — 82805 BLOOD GASES W/O2 SATURATION: CPT

## 2024-11-07 PROCEDURE — 36415 COLL VENOUS BLD VENIPUNCTURE: CPT

## 2024-11-07 PROCEDURE — 85025 COMPLETE CBC W/AUTO DIFF WBC: CPT

## 2024-11-07 PROCEDURE — 6360000002 HC RX W HCPCS

## 2024-11-07 PROCEDURE — 87040 BLOOD CULTURE FOR BACTERIA: CPT

## 2024-11-07 PROCEDURE — P9045 ALBUMIN (HUMAN), 5%, 250 ML: HCPCS

## 2024-11-07 PROCEDURE — 87070 CULTURE OTHR SPECIMN AEROBIC: CPT

## 2024-11-07 PROCEDURE — 82330 ASSAY OF CALCIUM: CPT

## 2024-11-07 PROCEDURE — 83605 ASSAY OF LACTIC ACID: CPT

## 2024-11-07 PROCEDURE — 99291 CRITICAL CARE FIRST HOUR: CPT | Performed by: STUDENT IN AN ORGANIZED HEALTH CARE EDUCATION/TRAINING PROGRAM

## 2024-11-07 PROCEDURE — 83735 ASSAY OF MAGNESIUM: CPT

## 2024-11-07 PROCEDURE — 2000000000 HC ICU R&B

## 2024-11-07 PROCEDURE — 87205 SMEAR GRAM STAIN: CPT

## 2024-11-07 PROCEDURE — 82550 ASSAY OF CK (CPK): CPT

## 2024-11-07 PROCEDURE — 94640 AIRWAY INHALATION TREATMENT: CPT

## 2024-11-07 PROCEDURE — 71045 X-RAY EXAM CHEST 1 VIEW: CPT

## 2024-11-07 PROCEDURE — 37799 UNLISTED PX VASCULAR SURGERY: CPT

## 2024-11-07 PROCEDURE — 2580000003 HC RX 258: Performed by: STUDENT IN AN ORGANIZED HEALTH CARE EDUCATION/TRAINING PROGRAM

## 2024-11-07 PROCEDURE — 80053 COMPREHEN METABOLIC PANEL: CPT

## 2024-11-07 RX ORDER — OXYCODONE HCL 5 MG/5 ML
15 SOLUTION, ORAL ORAL EVERY 6 HOURS
Status: DISCONTINUED | OUTPATIENT
Start: 2024-11-07 | End: 2024-11-11

## 2024-11-07 RX ORDER — SODIUM CHLORIDE, SODIUM LACTATE, POTASSIUM CHLORIDE, AND CALCIUM CHLORIDE .6; .31; .03; .02 G/100ML; G/100ML; G/100ML; G/100ML
1000 INJECTION, SOLUTION INTRAVENOUS ONCE
Status: COMPLETED | OUTPATIENT
Start: 2024-11-07 | End: 2024-11-07

## 2024-11-07 RX ORDER — NOREPINEPHRINE BITARTRATE 0.06 MG/ML
1-100 INJECTION, SOLUTION INTRAVENOUS CONTINUOUS
Status: DISCONTINUED | OUTPATIENT
Start: 2024-11-07 | End: 2024-11-08

## 2024-11-07 RX ORDER — FUROSEMIDE 10 MG/ML
20 INJECTION INTRAMUSCULAR; INTRAVENOUS ONCE
Status: COMPLETED | OUTPATIENT
Start: 2024-11-07 | End: 2024-11-07

## 2024-11-07 RX ORDER — ALBUMIN HUMAN 50 G/1000ML
25 SOLUTION INTRAVENOUS ONCE
Status: COMPLETED | OUTPATIENT
Start: 2024-11-07 | End: 2024-11-07

## 2024-11-07 RX ORDER — FENTANYL CITRATE 50 UG/ML
INJECTION, SOLUTION INTRAMUSCULAR; INTRAVENOUS
Status: COMPLETED
Start: 2024-11-07 | End: 2024-11-07

## 2024-11-07 RX ORDER — VECURONIUM BROMIDE 1 MG/ML
10 INJECTION, POWDER, LYOPHILIZED, FOR SOLUTION INTRAVENOUS
Status: COMPLETED | OUTPATIENT
Start: 2024-11-08 | End: 2024-11-08

## 2024-11-07 RX ORDER — FENTANYL CITRATE 50 UG/ML
50 INJECTION, SOLUTION INTRAMUSCULAR; INTRAVENOUS ONCE
Status: COMPLETED | OUTPATIENT
Start: 2024-11-07 | End: 2024-11-07

## 2024-11-07 RX ORDER — FENTANYL CITRATE 50 UG/ML
100 INJECTION, SOLUTION INTRAMUSCULAR; INTRAVENOUS
Status: COMPLETED | OUTPATIENT
Start: 2024-11-08 | End: 2024-11-08

## 2024-11-07 RX ORDER — POTASSIUM CHLORIDE 29.8 MG/ML
20 INJECTION INTRAVENOUS
Status: COMPLETED | OUTPATIENT
Start: 2024-11-07 | End: 2024-11-07

## 2024-11-07 RX ORDER — MIDAZOLAM HYDROCHLORIDE 2 MG/2ML
4 INJECTION, SOLUTION INTRAMUSCULAR; INTRAVENOUS
Status: COMPLETED | OUTPATIENT
Start: 2024-11-08 | End: 2024-11-08

## 2024-11-07 RX ADMIN — GABAPENTIN 300 MG: 300 CAPSULE ORAL at 14:10

## 2024-11-07 RX ADMIN — ACETAMINOPHEN 650 MG: 650 SOLUTION ORAL at 08:06

## 2024-11-07 RX ADMIN — CEFEPIME 2000 MG: 2 INJECTION, POWDER, FOR SOLUTION INTRAVENOUS at 00:27

## 2024-11-07 RX ADMIN — HYDROCORTISONE SODIUM SUCCINATE 25 MG: 100 INJECTION, POWDER, FOR SOLUTION INTRAMUSCULAR; INTRAVENOUS at 05:06

## 2024-11-07 RX ADMIN — SODIUM CHLORIDE, PRESERVATIVE FREE 40 MG: 5 INJECTION INTRAVENOUS at 19:28

## 2024-11-07 RX ADMIN — INSULIN GLARGINE 30 UNITS: 100 INJECTION, SOLUTION SUBCUTANEOUS at 08:30

## 2024-11-07 RX ADMIN — ALBUMIN (HUMAN) 25 G: 12.5 INJECTION, SOLUTION INTRAVENOUS at 05:00

## 2024-11-07 RX ADMIN — POTASSIUM CHLORIDE 20 MEQ: 29.8 INJECTION, SOLUTION INTRAVENOUS at 08:12

## 2024-11-07 RX ADMIN — SODIUM CHLORIDE, PRESERVATIVE FREE 40 MG: 5 INJECTION INTRAVENOUS at 08:07

## 2024-11-07 RX ADMIN — FAMOTIDINE 20 MG: 10 INJECTION, SOLUTION INTRAVENOUS at 19:25

## 2024-11-07 RX ADMIN — OXYCODONE HYDROCHLORIDE 10 MG: 5 SOLUTION ORAL at 03:46

## 2024-11-07 RX ADMIN — Medication 150 MCG/HR: at 17:26

## 2024-11-07 RX ADMIN — METHOCARBAMOL 1000 MG: 500 TABLET ORAL at 12:49

## 2024-11-07 RX ADMIN — CHLORHEXIDINE GLUCONATE, 0.12% ORAL RINSE 15 ML: 1.2 SOLUTION DENTAL at 08:06

## 2024-11-07 RX ADMIN — POTASSIUM CHLORIDE 20 MEQ: 29.8 INJECTION, SOLUTION INTRAVENOUS at 09:59

## 2024-11-07 RX ADMIN — CALCIUM GLUCONATE 2000 MG: 98 INJECTION, SOLUTION INTRAVENOUS at 08:17

## 2024-11-07 RX ADMIN — MEROPENEM 1000 MG: 1 INJECTION INTRAVENOUS at 21:21

## 2024-11-07 RX ADMIN — METHOCARBAMOL 1000 MG: 500 TABLET ORAL at 15:54

## 2024-11-07 RX ADMIN — SODIUM CHLORIDE 0.2 MG/KG/HR: 9 INJECTION, SOLUTION INTRAVENOUS at 12:55

## 2024-11-07 RX ADMIN — METHOCARBAMOL 1000 MG: 500 TABLET ORAL at 19:28

## 2024-11-07 RX ADMIN — INSULIN LISPRO 4 UNITS: 100 INJECTION, SOLUTION INTRAVENOUS; SUBCUTANEOUS at 05:06

## 2024-11-07 RX ADMIN — GUAIFENESIN 400 MG: 400 TABLET ORAL at 14:10

## 2024-11-07 RX ADMIN — DEXMEDETOMIDINE HYDROCHLORIDE 0.6 MCG/KG/HR: 100 INJECTION, SOLUTION, CONCENTRATE INTRAVENOUS at 09:58

## 2024-11-07 RX ADMIN — FENTANYL CITRATE 50 MCG: 50 INJECTION, SOLUTION INTRAMUSCULAR; INTRAVENOUS at 17:26

## 2024-11-07 RX ADMIN — ENOXAPARIN SODIUM 60 MG: 100 INJECTION SUBCUTANEOUS at 20:54

## 2024-11-07 RX ADMIN — CHLORHEXIDINE GLUCONATE, 0.12% ORAL RINSE 15 ML: 1.2 SOLUTION DENTAL at 19:25

## 2024-11-07 RX ADMIN — ACETYLCYSTEINE 600 MG: 200 INHALANT RESPIRATORY (INHALATION) at 09:11

## 2024-11-07 RX ADMIN — GUAIFENESIN 400 MG: 400 TABLET ORAL at 19:25

## 2024-11-07 RX ADMIN — GUAIFENESIN 400 MG: 400 TABLET ORAL at 08:06

## 2024-11-07 RX ADMIN — ACETYLCYSTEINE 600 MG: 200 INHALANT RESPIRATORY (INHALATION) at 19:53

## 2024-11-07 RX ADMIN — ACETAMINOPHEN 650 MG: 650 SOLUTION ORAL at 03:46

## 2024-11-07 RX ADMIN — Medication 5 MCG/MIN: at 05:18

## 2024-11-07 RX ADMIN — IPRATROPIUM BROMIDE AND ALBUTEROL SULFATE 1 DOSE: 2.5; .5 SOLUTION RESPIRATORY (INHALATION) at 16:01

## 2024-11-07 RX ADMIN — IPRATROPIUM BROMIDE AND ALBUTEROL SULFATE 1 DOSE: 2.5; .5 SOLUTION RESPIRATORY (INHALATION) at 19:53

## 2024-11-07 RX ADMIN — Medication 125 MCG/HR: at 09:08

## 2024-11-07 RX ADMIN — Medication 125 MCG/HR: at 02:35

## 2024-11-07 RX ADMIN — GABAPENTIN 300 MG: 300 CAPSULE ORAL at 08:30

## 2024-11-07 RX ADMIN — IPRATROPIUM BROMIDE AND ALBUTEROL SULFATE 1 DOSE: 2.5; .5 SOLUTION RESPIRATORY (INHALATION) at 12:25

## 2024-11-07 RX ADMIN — POTASSIUM CHLORIDE 20 MEQ: 29.8 INJECTION, SOLUTION INTRAVENOUS at 09:00

## 2024-11-07 RX ADMIN — ALBUMIN (HUMAN) 25 G: 12.5 INJECTION, SOLUTION INTRAVENOUS at 01:26

## 2024-11-07 RX ADMIN — ACETAMINOPHEN 650 MG: 650 SOLUTION ORAL at 14:10

## 2024-11-07 RX ADMIN — FENTANYL CITRATE 50 MCG: 50 INJECTION INTRAMUSCULAR; INTRAVENOUS at 17:26

## 2024-11-07 RX ADMIN — ALBUMIN (HUMAN) 25 G: 12.5 INJECTION, SOLUTION INTRAVENOUS at 15:47

## 2024-11-07 RX ADMIN — GABAPENTIN 300 MG: 300 CAPSULE ORAL at 20:54

## 2024-11-07 RX ADMIN — SODIUM CHLORIDE, POTASSIUM CHLORIDE, SODIUM LACTATE AND CALCIUM CHLORIDE 1000 ML: 600; 310; 30; 20 INJECTION, SOLUTION INTRAVENOUS at 10:44

## 2024-11-07 RX ADMIN — POTASSIUM CHLORIDE 20 MEQ: 29.8 INJECTION, SOLUTION INTRAVENOUS at 11:11

## 2024-11-07 RX ADMIN — OXYCODONE HYDROCHLORIDE 10 MG: 5 SOLUTION ORAL at 06:53

## 2024-11-07 RX ADMIN — FUROSEMIDE 20 MG: 10 INJECTION, SOLUTION INTRAMUSCULAR; INTRAVENOUS at 15:54

## 2024-11-07 RX ADMIN — ACETAMINOPHEN 650 MG: 650 SOLUTION ORAL at 19:25

## 2024-11-07 RX ADMIN — BACITRACIN ZINC: 500 OINTMENT TOPICAL at 08:07

## 2024-11-07 RX ADMIN — CEFEPIME 2000 MG: 2 INJECTION, POWDER, FOR SOLUTION INTRAVENOUS at 08:06

## 2024-11-07 RX ADMIN — METHOCARBAMOL 1000 MG: 500 TABLET ORAL at 08:06

## 2024-11-07 RX ADMIN — OXYCODONE HYDROCHLORIDE 15 MG: 5 SOLUTION ORAL at 19:24

## 2024-11-07 RX ADMIN — ENOXAPARIN SODIUM 60 MG: 100 INJECTION SUBCUTANEOUS at 08:30

## 2024-11-07 RX ADMIN — OXYCODONE HYDROCHLORIDE 10 MG: 5 SOLUTION ORAL at 12:49

## 2024-11-07 RX ADMIN — CEFEPIME 2000 MG: 2 INJECTION, POWDER, FOR SOLUTION INTRAVENOUS at 16:52

## 2024-11-07 RX ADMIN — IPRATROPIUM BROMIDE AND ALBUTEROL SULFATE 1 DOSE: 2.5; .5 SOLUTION RESPIRATORY (INHALATION) at 09:11

## 2024-11-07 ASSESSMENT — PULMONARY FUNCTION TESTS
PIF_VALUE: 31
PIF_VALUE: 19
PIF_VALUE: 22
PIF_VALUE: 18
PIF_VALUE: 29
PIF_VALUE: 18
PIF_VALUE: 22
PIF_VALUE: 20
PIF_VALUE: 24
PIF_VALUE: 23
PIF_VALUE: 18
PIF_VALUE: 22
PIF_VALUE: 24
PIF_VALUE: 20
PIF_VALUE: 19
PIF_VALUE: 16
PIF_VALUE: 21
PIF_VALUE: 19
PIF_VALUE: 24
PIF_VALUE: 21
PIF_VALUE: 21
PIF_VALUE: 22
PIF_VALUE: 22
PIF_VALUE: 21
PIF_VALUE: 23
PIF_VALUE: 25
PIF_VALUE: 28
PIF_VALUE: 22
PIF_VALUE: 21
PIF_VALUE: 19
PIF_VALUE: 21

## 2024-11-07 ASSESSMENT — PAIN SCALES - GENERAL
PAINLEVEL_OUTOF10: 1
PAINLEVEL_OUTOF10: 0
PAINLEVEL_OUTOF10: 4
PAINLEVEL_OUTOF10: 0
PAINLEVEL_OUTOF10: 0
PAINLEVEL_OUTOF10: 4
PAINLEVEL_OUTOF10: 0

## 2024-11-07 NOTE — FLOWSHEET NOTE
Patient intubated, agitation present with attempts to reach for lines/tubes, involuntary movements to cause harm. No evidence of learning present at this time, bilateral soft wrist restraints placed for safety and line/tube protection. Family notified.

## 2024-11-07 NOTE — PROGRESS NOTES
Physical Therapy  Physical Therapy Attempt    Name: Glenroy Simmons II  : 1975  MRN: [id number]      Date of Service: 2024  Chart reviewed.  Pt was re-intubated yesterday and not medically stable for skilled PT at this time - RN agreed.  Will re-attempt as able.    Alex Gallardo, PT, DPT  [id number]

## 2024-11-07 NOTE — PROGRESS NOTES
OT consult received and appreciated. Chart reviewed, discussed at AM rounds and consulted RN. Pt is not appropriate for occupational therapy at this time. Will evaluate at a later time.     Marcelina Montalvo, OTR/L # [id number]

## 2024-11-07 NOTE — FLOWSHEET NOTE
Patient attempts to pull at lines and tubes when unrestrained for ROM. Pt is not redirectable at this time. Soft bilateral wrist restraints continued for patient safety.

## 2024-11-07 NOTE — PROGRESS NOTES
Speech Language Pathology  NAME:  Glenroy Simmons II  :  1975  DATE: 2024  ROOM:  [facility]-A    Pt unavailable at 0700 for Clinical Swallow Evaluation     REASON:  Patient intubated SLP will signoff at this time please reorder evaluation when medically appropriate     Will re-attempt as appropriate.       Thank You

## 2024-11-07 NOTE — PROGRESS NOTES
10/30  -Nonweightbearing bilateral lower extremities and right upper extremity  -Left tibial s/p ORIF 11/3    ++Hypernatremia- improving   - Continue enteral hydration and monitor Na      Nutrition- Tube feeds     Consults and recommendations   Ortho PLAN      Continue physical therapy and protocol: NWB - Bilateral Lower Extremity  Antibiotics per admitting service  Deep venous thrombosis prophylaxis - per SICU, early mobilization  PT/OT  Pain Control: IV and PO  Patient will require treatment for the left hip, however this will not be completed during this current hospital stay.  After consolidation of his fracture, he will require revision arthroplasty for the left hip after fracture healing.  He may follow-up in the orthopedic clinic 2 weeks after discharge from the hospital.     ++Pneumonia - E. coli heavy growth   - Continue Cefepime until 11/10     DVT prophylaxis--SCDS, heparin 7500 tid  GI Prophylaxis--Protonix  Lines--arterial line 10/24, PICC line 10/31  CODE: FULL     CBC:   Lab Results   Component Value Date/Time    WBC 21.8 11/07/2024 04:50 AM    RBC 2.55 11/07/2024 04:50 AM    HGB 7.4 11/07/2024 04:50 AM    HCT 25.0 11/07/2024 04:50 AM    MCV 98.0 11/07/2024 04:50 AM    MCH 29.0 11/07/2024 04:50 AM    MCHC 29.6 11/07/2024 04:50 AM    RDW 19.1 11/07/2024 04:50 AM     11/07/2024 04:50 AM    MPV 10.7 11/07/2024 04:50 AM     CMP:    Lab Results   Component Value Date/Time     11/07/2024 04:50 AM    K 2.8 11/07/2024 04:50 AM     11/07/2024 04:50 AM    CO2 31 11/07/2024 04:50 AM    BUN 41 11/07/2024 04:50 AM    CREATININE 1.0 11/07/2024 04:50 AM    LABGLOM 89 11/07/2024 04:50 AM    GLUCOSE 156 11/07/2024 04:50 AM    CALCIUM 7.7 11/07/2024 04:50 AM    BILITOT 1.1 11/07/2024 04:50 AM    ALKPHOS 218 11/07/2024 04:50 AM    AST 73 11/07/2024 04:50 AM    ALT 86 11/07/2024 04:50 AM     ABG:    Lab Results   Component Value Date/Time    PH 7.467 11/07/2024 04:57 AM    PCO2 39.6 11/07/2024 04:57 AM     PO2 89.3 11/07/2024 04:57 AM    HCO3 28.0 11/07/2024 04:57 AM    BE 4.0 11/07/2024 04:57 AM    O2SAT 96.9 11/07/2024 04:57 AM     Imaging: AM CXR reviewed and my interpretation is he has an interval development of a left side small effusion.  Support devices are in appropriate position     Yazan Schaffer MD   Trauma/Critical care

## 2024-11-07 NOTE — PROGRESS NOTES
[facility]  MD Laron Muller MD Ali Hassan, MD Lisa Kniska, CNP   Lo Elmore, BLAIRE Davidson, CNP  Progress note   Patient's Name: Glenroy Simmons II  1:26 PM  11/7/2024        10/26 : seen in his room intubated mechanically ventilated fio2 90 peep 10 massively hyperovlemic , UO ok . No pressors .     10/27 seen in his room remain critically ill , mechanically ventilated no pressors .   We did try SLED yesterday and we could not achieve a good ultrafiltration due to lower blood pressure ,we did start him on a Bumex drip today and has been having excellent urine output in the range of 2  an hour per ICU nursing his azotemia is actually slightly better .  His oxygen requirement are higher and better ICU the plan is to do a CT scan    10/28: Seen in the SICU. Critically ill. Mechanically ventilated, FiO2 70 %. Receiving 1 unit PRBCs. He does open his eyes to voice. Remains hypervolemic. Urine output is excellent on Bumex drip. Not on any pressors.     10/29: Seen in the SICU. Remains critically ill. Mechanically ventilated via ETT. FiO2 90 %, PEEP 10. He does open his eyes to voice. Excellent urine output on Bumex drip, over 9 liters yesterday. Hypervolemia is improving. Blood pressure is stable.     10/30:  Remains critically ill.  Mechanically ventilated-->FiO2- 80% peep-10.  Continues on Bumex, Fentanyl, Levo and LR.  Bilateral chest tubes.  Going to OR with ortho.     10/31: Seen in the ICU. Critically ill. Mechanically ventilated. FiO2 75 %. He does open is eyes to voice.  Excellent urine output with Bumex drip. Chest tubes removed today.     11/1: Seen in the SICU. Mechanically ventilated and sedated. FiO2 is 70 %. He does open his eyes to voice. Rate of Bumex drip decreased today. Urine output remains excellent. Hypervolemia improving. Tolerating his tube feedings without issues. Brother is present at the bedside.     11/2: Seen in the ICU.  Remains

## 2024-11-07 NOTE — FLOWSHEET NOTE
Patient intubated becoming agitated/restless at times attempting to reach for ETT and lines for critical care management despite attempts or redirection. Will continue use of soft bilateral wrist restraints at this time. Will continue to assess and monitor for earliest removal.
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
## 2024-11-08 ENCOUNTER — APPOINTMENT (OUTPATIENT)
Dept: GENERAL RADIOLOGY | Age: 49
End: 2024-11-08
Payer: MEDICARE

## 2024-11-08 LAB
ABO + RH BLD: NORMAL
ALBUMIN SERPL-MCNC: 2.9 G/DL (ref 3.5–5.2)
ALBUMIN SERPL-MCNC: 3.1 G/DL (ref 3.5–5.2)
ALP SERPL-CCNC: 213 U/L (ref 40–129)
ALP SERPL-CCNC: 231 U/L (ref 40–129)
ALT SERPL-CCNC: 64 U/L (ref 0–40)
ALT SERPL-CCNC: 74 U/L (ref 0–40)
ANION GAP SERPL CALCULATED.3IONS-SCNC: 11 MMOL/L (ref 7–16)
ANION GAP SERPL CALCULATED.3IONS-SCNC: 7 MMOL/L (ref 7–16)
ARM BAND NUMBER: NORMAL
AST SERPL-CCNC: 44 U/L (ref 0–39)
AST SERPL-CCNC: 54 U/L (ref 0–39)
BACTERIA URNS QL MICRO: ABNORMAL
BASOPHILS # BLD: 0.42 K/UL (ref 0–0.2)
BASOPHILS NFR BLD: 2 % (ref 0–2)
BILIRUB SERPL-MCNC: 1.3 MG/DL (ref 0–1.2)
BILIRUB SERPL-MCNC: 1.4 MG/DL (ref 0–1.2)
BILIRUB UR QL STRIP: NEGATIVE
BLOOD BANK SAMPLE EXPIRATION: NORMAL
BLOOD GROUP ANTIBODIES SERPL: NEGATIVE
BUN SERPL-MCNC: 35 MG/DL (ref 6–20)
BUN SERPL-MCNC: 36 MG/DL (ref 6–20)
CA-I BLD-SCNC: 1.12 MMOL/L (ref 1.15–1.33)
CA-I BLD-SCNC: 1.14 MMOL/L (ref 1.15–1.33)
CALCIUM SERPL-MCNC: 7.7 MG/DL (ref 8.6–10.2)
CALCIUM SERPL-MCNC: 8.1 MG/DL (ref 8.6–10.2)
CHLORIDE SERPL-SCNC: 112 MMOL/L (ref 98–107)
CHLORIDE SERPL-SCNC: 114 MMOL/L (ref 98–107)
CLARITY UR: CLEAR
CO2 SERPL-SCNC: 28 MMOL/L (ref 22–29)
CO2 SERPL-SCNC: 30 MMOL/L (ref 22–29)
COLOR UR: YELLOW
CREAT SERPL-MCNC: 1 MG/DL (ref 0.7–1.2)
CREAT SERPL-MCNC: 1 MG/DL (ref 0.7–1.2)
EOSINOPHIL # BLD: 0.21 K/UL (ref 0.05–0.5)
EOSINOPHILS RELATIVE PERCENT: 1 % (ref 0–6)
ERYTHROCYTE [DISTWIDTH] IN BLOOD BY AUTOMATED COUNT: 19.6 % (ref 11.5–15)
GFR, ESTIMATED: 88 ML/MIN/1.73M2
GFR, ESTIMATED: >90 ML/MIN/1.73M2
GLUCOSE BLD-MCNC: 103 MG/DL (ref 74–99)
GLUCOSE BLD-MCNC: 132 MG/DL (ref 74–99)
GLUCOSE BLD-MCNC: 140 MG/DL (ref 74–99)
GLUCOSE BLD-MCNC: 159 MG/DL (ref 74–99)
GLUCOSE BLD-MCNC: 182 MG/DL (ref 74–99)
GLUCOSE SERPL-MCNC: 131 MG/DL (ref 74–99)
GLUCOSE SERPL-MCNC: 147 MG/DL (ref 74–99)
GLUCOSE UR STRIP-MCNC: NEGATIVE MG/DL
HCT VFR BLD AUTO: 26.7 % (ref 37–54)
HGB BLD-MCNC: 7.8 G/DL (ref 12.5–16.5)
HGB UR QL STRIP.AUTO: ABNORMAL
KETONES UR STRIP-MCNC: NEGATIVE MG/DL
LEUKOCYTE ESTERASE UR QL STRIP: NEGATIVE
LYMPHOCYTES NFR BLD: 1.91 K/UL (ref 1.5–4)
LYMPHOCYTES RELATIVE PERCENT: 8 % (ref 20–42)
MAGNESIUM SERPL-MCNC: 2.3 MG/DL (ref 1.6–2.6)
MAGNESIUM SERPL-MCNC: 2.4 MG/DL (ref 1.6–2.6)
MCH RBC QN AUTO: 29.2 PG (ref 26–35)
MCHC RBC AUTO-ENTMCNC: 29.2 G/DL (ref 32–34.5)
MCV RBC AUTO: 100 FL (ref 80–99.9)
METAMYELOCYTES ABSOLUTE COUNT: 0.21 K/UL (ref 0–0.12)
METAMYELOCYTES: 1 % (ref 0–1)
MICROORGANISM SPEC CULT: NORMAL
MICROORGANISM/AGENT SPEC: NORMAL
MONOCYTES NFR BLD: 1.7 K/UL (ref 0.1–0.95)
MONOCYTES NFR BLD: 7 % (ref 2–12)
NEUTROPHILS NFR BLD: 82 % (ref 43–80)
NEUTS SEG NFR BLD: 19.94 K/UL (ref 1.8–7.3)
NITRITE UR QL STRIP: NEGATIVE
NUCLEATED RED BLOOD CELLS: 2 PER 100 WBC
PH UR STRIP: 6 [PH] (ref 5–9)
PHOSPHATE SERPL-MCNC: 3.1 MG/DL (ref 2.5–4.5)
PHOSPHATE SERPL-MCNC: 3.3 MG/DL (ref 2.5–4.5)
PLATELET # BLD AUTO: 595 K/UL (ref 130–450)
PMV BLD AUTO: 10.8 FL (ref 7–12)
POTASSIUM SERPL-SCNC: 3.4 MMOL/L (ref 3.5–5)
POTASSIUM SERPL-SCNC: 3.8 MMOL/L (ref 3.5–5)
PROT SERPL-MCNC: 5.4 G/DL (ref 6.4–8.3)
PROT SERPL-MCNC: 5.6 G/DL (ref 6.4–8.3)
PROT UR STRIP-MCNC: 100 MG/DL
RBC # BLD AUTO: 2.67 M/UL (ref 3.8–5.8)
RBC # BLD: ABNORMAL 10*6/UL
RBC #/AREA URNS HPF: ABNORMAL /HPF
SODIUM SERPL-SCNC: 146 MMOL/L (ref 132–146)
SODIUM SERPL-SCNC: 149 MMOL/L (ref 132–146)
SODIUM SERPL-SCNC: 149 MMOL/L (ref 132–146)
SODIUM SERPL-SCNC: 150 MMOL/L (ref 132–146)
SODIUM SERPL-SCNC: 153 MMOL/L (ref 132–146)
SP GR UR STRIP: 1.02 (ref 1–1.03)
SPECIMEN DESCRIPTION: NORMAL
UROBILINOGEN UR STRIP-ACNC: 0.2 EU/DL (ref 0–1)
WBC #/AREA URNS HPF: ABNORMAL /HPF
WBC OTHER # BLD: 24.4 K/UL (ref 4.5–11.5)

## 2024-11-08 PROCEDURE — 84295 ASSAY OF SERUM SODIUM: CPT

## 2024-11-08 PROCEDURE — 94640 AIRWAY INHALATION TREATMENT: CPT

## 2024-11-08 PROCEDURE — 2580000003 HC RX 258

## 2024-11-08 PROCEDURE — 31645 BRNCHSC W/THER ASPIR 1ST: CPT | Performed by: STUDENT IN AN ORGANIZED HEALTH CARE EDUCATION/TRAINING PROGRAM

## 2024-11-08 PROCEDURE — 2500000003 HC RX 250 WO HCPCS

## 2024-11-08 PROCEDURE — 87102 FUNGUS ISOLATION CULTURE: CPT

## 2024-11-08 PROCEDURE — 6360000002 HC RX W HCPCS

## 2024-11-08 PROCEDURE — 86900 BLOOD TYPING SEROLOGIC ABO: CPT

## 2024-11-08 PROCEDURE — 6370000000 HC RX 637 (ALT 250 FOR IP)

## 2024-11-08 PROCEDURE — 36592 COLLECT BLOOD FROM PICC: CPT

## 2024-11-08 PROCEDURE — 2709999900 HC NON-CHARGEABLE SUPPLY: Performed by: STUDENT IN AN ORGANIZED HEALTH CARE EDUCATION/TRAINING PROGRAM

## 2024-11-08 PROCEDURE — 99291 CRITICAL CARE FIRST HOUR: CPT | Performed by: STUDENT IN AN ORGANIZED HEALTH CARE EDUCATION/TRAINING PROGRAM

## 2024-11-08 PROCEDURE — 85025 COMPLETE CBC W/AUTO DIFF WBC: CPT

## 2024-11-08 PROCEDURE — 87205 SMEAR GRAM STAIN: CPT

## 2024-11-08 PROCEDURE — 82962 GLUCOSE BLOOD TEST: CPT

## 2024-11-08 PROCEDURE — 43762 RPLC GTUBE NO REVJ TRC: CPT

## 2024-11-08 PROCEDURE — 86901 BLOOD TYPING SEROLOGIC RH(D): CPT

## 2024-11-08 PROCEDURE — 0B938ZZ DRAINAGE OF RIGHT MAIN BRONCHUS, VIA NATURAL OR ARTIFICIAL OPENING ENDOSCOPIC: ICD-10-PCS | Performed by: STUDENT IN AN ORGANIZED HEALTH CARE EDUCATION/TRAINING PROGRAM

## 2024-11-08 PROCEDURE — 31603 EMER TRACHEOSTOMY TTRACH: CPT | Performed by: STUDENT IN AN ORGANIZED HEALTH CARE EDUCATION/TRAINING PROGRAM

## 2024-11-08 PROCEDURE — 71045 X-RAY EXAM CHEST 1 VIEW: CPT

## 2024-11-08 PROCEDURE — 3609013300 HC EGD TUBE PLACEMENT: Performed by: STUDENT IN AN ORGANIZED HEALTH CARE EDUCATION/TRAINING PROGRAM

## 2024-11-08 PROCEDURE — 2000000000 HC ICU R&B

## 2024-11-08 PROCEDURE — 86850 RBC ANTIBODY SCREEN: CPT

## 2024-11-08 PROCEDURE — 0DH63UZ INSERTION OF FEEDING DEVICE INTO STOMACH, PERCUTANEOUS APPROACH: ICD-10-PCS | Performed by: STUDENT IN AN ORGANIZED HEALTH CARE EDUCATION/TRAINING PROGRAM

## 2024-11-08 PROCEDURE — 0B978ZZ DRAINAGE OF LEFT MAIN BRONCHUS, VIA NATURAL OR ARTIFICIAL OPENING ENDOSCOPIC: ICD-10-PCS | Performed by: STUDENT IN AN ORGANIZED HEALTH CARE EDUCATION/TRAINING PROGRAM

## 2024-11-08 PROCEDURE — 37799 UNLISTED PX VASCULAR SURGERY: CPT

## 2024-11-08 PROCEDURE — 94003 VENT MGMT INPAT SUBQ DAY: CPT

## 2024-11-08 PROCEDURE — 82330 ASSAY OF CALCIUM: CPT

## 2024-11-08 PROCEDURE — 81001 URINALYSIS AUTO W/SCOPE: CPT

## 2024-11-08 PROCEDURE — 80053 COMPREHEN METABOLIC PANEL: CPT

## 2024-11-08 PROCEDURE — 83735 ASSAY OF MAGNESIUM: CPT

## 2024-11-08 PROCEDURE — 84100 ASSAY OF PHOSPHORUS: CPT

## 2024-11-08 PROCEDURE — 87070 CULTURE OTHR SPECIMN AEROBIC: CPT

## 2024-11-08 PROCEDURE — 43246 EGD PLACE GASTROSTOMY TUBE: CPT | Performed by: STUDENT IN AN ORGANIZED HEALTH CARE EDUCATION/TRAINING PROGRAM

## 2024-11-08 PROCEDURE — 0B113F4 BYPASS TRACHEA TO CUTANEOUS WITH TRACHEOSTOMY DEVICE, PERCUTANEOUS APPROACH: ICD-10-PCS | Performed by: STUDENT IN AN ORGANIZED HEALTH CARE EDUCATION/TRAINING PROGRAM

## 2024-11-08 RX ORDER — LIDOCAINE HYDROCHLORIDE AND EPINEPHRINE 10; 10 MG/ML; UG/ML
INJECTION, SOLUTION INFILTRATION; PERINEURAL
Status: COMPLETED
Start: 2024-11-08 | End: 2024-11-08

## 2024-11-08 RX ORDER — FENTANYL CITRATE 50 UG/ML
100 INJECTION, SOLUTION INTRAMUSCULAR; INTRAVENOUS ONCE
Status: DISCONTINUED | OUTPATIENT
Start: 2024-11-08 | End: 2024-11-08

## 2024-11-08 RX ORDER — FENTANYL CITRATE 50 UG/ML
100 INJECTION, SOLUTION INTRAMUSCULAR; INTRAVENOUS ONCE
Status: COMPLETED | OUTPATIENT
Start: 2024-11-08 | End: 2024-11-08

## 2024-11-08 RX ORDER — FENTANYL CITRATE 50 UG/ML
INJECTION, SOLUTION INTRAMUSCULAR; INTRAVENOUS
Status: COMPLETED
Start: 2024-11-08 | End: 2024-11-08

## 2024-11-08 RX ORDER — LABETALOL HYDROCHLORIDE 5 MG/ML
10 INJECTION, SOLUTION INTRAVENOUS EVERY 30 MIN PRN
Status: DISCONTINUED | OUTPATIENT
Start: 2024-11-08 | End: 2024-11-14 | Stop reason: HOSPADM

## 2024-11-08 RX ORDER — CYCLOBENZAPRINE HCL 10 MG
10 TABLET ORAL 3 TIMES DAILY
Status: DISCONTINUED | OUTPATIENT
Start: 2024-11-08 | End: 2024-11-08

## 2024-11-08 RX ORDER — HYDRALAZINE HYDROCHLORIDE 20 MG/ML
10 INJECTION INTRAMUSCULAR; INTRAVENOUS EVERY 30 MIN PRN
Status: DISCONTINUED | OUTPATIENT
Start: 2024-11-08 | End: 2024-11-14 | Stop reason: HOSPADM

## 2024-11-08 RX ORDER — MIDAZOLAM HYDROCHLORIDE 1 MG/ML
INJECTION, SOLUTION INTRAMUSCULAR; INTRAVENOUS
Status: COMPLETED
Start: 2024-11-08 | End: 2024-11-08

## 2024-11-08 RX ORDER — MIDAZOLAM HYDROCHLORIDE 2 MG/2ML
2 INJECTION, SOLUTION INTRAMUSCULAR; INTRAVENOUS ONCE
Status: COMPLETED | OUTPATIENT
Start: 2024-11-08 | End: 2024-11-08

## 2024-11-08 RX ORDER — LORAZEPAM 2 MG/ML
1 INJECTION INTRAMUSCULAR EVERY 4 HOURS PRN
Status: DISCONTINUED | OUTPATIENT
Start: 2024-11-08 | End: 2024-11-11

## 2024-11-08 RX ORDER — LABETALOL HYDROCHLORIDE 5 MG/ML
10 INJECTION, SOLUTION INTRAVENOUS ONCE
Status: COMPLETED | OUTPATIENT
Start: 2024-11-08 | End: 2024-11-08

## 2024-11-08 RX ORDER — CYCLOBENZAPRINE HCL 10 MG
10 TABLET ORAL 3 TIMES DAILY
Status: DISCONTINUED | OUTPATIENT
Start: 2024-11-08 | End: 2024-11-14 | Stop reason: HOSPADM

## 2024-11-08 RX ORDER — POTASSIUM CHLORIDE 7.45 MG/ML
10 INJECTION INTRAVENOUS
Status: COMPLETED | OUTPATIENT
Start: 2024-11-08 | End: 2024-11-08

## 2024-11-08 RX ORDER — PANTOPRAZOLE SODIUM 40 MG/1
40 TABLET, DELAYED RELEASE ORAL 2 TIMES DAILY
Status: DISCONTINUED | OUTPATIENT
Start: 2024-11-08 | End: 2024-11-11

## 2024-11-08 RX ORDER — LABETALOL HYDROCHLORIDE 5 MG/ML
INJECTION, SOLUTION INTRAVENOUS
Status: COMPLETED
Start: 2024-11-08 | End: 2024-11-08

## 2024-11-08 RX ORDER — HYDROMORPHONE HYDROCHLORIDE 1 MG/ML
1 INJECTION, SOLUTION INTRAMUSCULAR; INTRAVENOUS; SUBCUTANEOUS
Status: DISCONTINUED | OUTPATIENT
Start: 2024-11-08 | End: 2024-11-11

## 2024-11-08 RX ADMIN — POTASSIUM CHLORIDE 10 MEQ: 7.45 INJECTION INTRAVENOUS at 15:34

## 2024-11-08 RX ADMIN — SODIUM CHLORIDE 0.3 MG/KG/HR: 9 INJECTION, SOLUTION INTRAVENOUS at 09:47

## 2024-11-08 RX ADMIN — ACETAMINOPHEN 650 MG: 650 SOLUTION ORAL at 10:18

## 2024-11-08 RX ADMIN — HYDROCORTISONE SODIUM SUCCINATE 25 MG: 100 INJECTION, POWDER, FOR SOLUTION INTRAMUSCULAR; INTRAVENOUS at 10:18

## 2024-11-08 RX ADMIN — WATER 2 ML: 1 INJECTION INTRAMUSCULAR; INTRAVENOUS; SUBCUTANEOUS at 10:33

## 2024-11-08 RX ADMIN — POTASSIUM CHLORIDE 10 MEQ: 7.45 INJECTION INTRAVENOUS at 12:21

## 2024-11-08 RX ADMIN — CALCIUM GLUCONATE 2000 MG: 98 INJECTION, SOLUTION INTRAVENOUS at 10:28

## 2024-11-08 RX ADMIN — METHOCARBAMOL 1000 MG: 500 TABLET ORAL at 11:50

## 2024-11-08 RX ADMIN — ACETYLCYSTEINE 600 MG: 200 INHALANT RESPIRATORY (INHALATION) at 08:34

## 2024-11-08 RX ADMIN — ENOXAPARIN SODIUM 60 MG: 100 INJECTION SUBCUTANEOUS at 21:00

## 2024-11-08 RX ADMIN — MIDAZOLAM 2 MG: 1 INJECTION INTRAMUSCULAR; INTRAVENOUS at 09:25

## 2024-11-08 RX ADMIN — OXYCODONE HYDROCHLORIDE 15 MG: 5 SOLUTION ORAL at 11:50

## 2024-11-08 RX ADMIN — VECURONIUM BROMIDE 10 MG: 1 INJECTION, POWDER, LYOPHILIZED, FOR SOLUTION INTRAVENOUS at 08:46

## 2024-11-08 RX ADMIN — FENTANYL CITRATE 100 MCG: 50 INJECTION, SOLUTION INTRAMUSCULAR; INTRAVENOUS at 09:27

## 2024-11-08 RX ADMIN — ACETAMINOPHEN 650 MG: 650 SOLUTION ORAL at 03:12

## 2024-11-08 RX ADMIN — POTASSIUM CHLORIDE 10 MEQ: 7.45 INJECTION INTRAVENOUS at 14:31

## 2024-11-08 RX ADMIN — BACITRACIN ZINC: 500 OINTMENT TOPICAL at 10:26

## 2024-11-08 RX ADMIN — OXYCODONE HYDROCHLORIDE 15 MG: 5 SOLUTION ORAL at 00:54

## 2024-11-08 RX ADMIN — MEROPENEM 1000 MG: 1 INJECTION INTRAVENOUS at 21:11

## 2024-11-08 RX ADMIN — ACETAMINOPHEN 650 MG: 650 SOLUTION ORAL at 21:00

## 2024-11-08 RX ADMIN — INSULIN LISPRO 4 UNITS: 100 INJECTION, SOLUTION INTRAVENOUS; SUBCUTANEOUS at 10:35

## 2024-11-08 RX ADMIN — FENTANYL CITRATE 100 MCG: 50 INJECTION, SOLUTION INTRAMUSCULAR; INTRAVENOUS at 08:53

## 2024-11-08 RX ADMIN — POTASSIUM CHLORIDE 10 MEQ: 7.45 INJECTION INTRAVENOUS at 11:16

## 2024-11-08 RX ADMIN — Medication 175 MCG/HR: at 10:19

## 2024-11-08 RX ADMIN — SODIUM CHLORIDE, PRESERVATIVE FREE 40 MG: 5 INJECTION INTRAVENOUS at 10:18

## 2024-11-08 RX ADMIN — IPRATROPIUM BROMIDE AND ALBUTEROL SULFATE 1 DOSE: 2.5; .5 SOLUTION RESPIRATORY (INHALATION) at 08:34

## 2024-11-08 RX ADMIN — CHLORHEXIDINE GLUCONATE, 0.12% ORAL RINSE 15 ML: 1.2 SOLUTION DENTAL at 10:18

## 2024-11-08 RX ADMIN — CYCLOBENZAPRINE 10 MG: 10 TABLET, FILM COATED ORAL at 21:00

## 2024-11-08 RX ADMIN — GABAPENTIN 300 MG: 300 CAPSULE ORAL at 21:00

## 2024-11-08 RX ADMIN — Medication 175 MCG/HR: at 16:10

## 2024-11-08 RX ADMIN — POTASSIUM CHLORIDE 10 MEQ: 7.45 INJECTION INTRAVENOUS at 13:24

## 2024-11-08 RX ADMIN — MIDAZOLAM HYDROCHLORIDE 2 MG: 2 INJECTION, SOLUTION INTRAMUSCULAR; INTRAVENOUS at 09:25

## 2024-11-08 RX ADMIN — OXYCODONE HYDROCHLORIDE 15 MG: 5 SOLUTION ORAL at 07:06

## 2024-11-08 RX ADMIN — GABAPENTIN 300 MG: 300 CAPSULE ORAL at 10:17

## 2024-11-08 RX ADMIN — FENTANYL CITRATE 100 MCG: 50 INJECTION INTRAMUSCULAR; INTRAVENOUS at 08:44

## 2024-11-08 RX ADMIN — LIDOCAINE HYDROCHLORIDE,EPINEPHRINE BITARTRATE 20 ML: 10; .01 INJECTION, SOLUTION INFILTRATION; PERINEURAL at 08:40

## 2024-11-08 RX ADMIN — GUAIFENESIN 400 MG: 400 TABLET ORAL at 14:15

## 2024-11-08 RX ADMIN — GUAIFENESIN 400 MG: 400 TABLET ORAL at 10:18

## 2024-11-08 RX ADMIN — CYCLOBENZAPRINE 10 MG: 10 TABLET, FILM COATED ORAL at 18:25

## 2024-11-08 RX ADMIN — MEROPENEM 1000 MG: 1 INJECTION INTRAVENOUS at 06:07

## 2024-11-08 RX ADMIN — LORAZEPAM 1 MG: 2 INJECTION INTRAMUSCULAR; INTRAVENOUS at 21:00

## 2024-11-08 RX ADMIN — POTASSIUM CHLORIDE 10 MEQ: 7.45 INJECTION INTRAVENOUS at 10:25

## 2024-11-08 RX ADMIN — ACETYLCYSTEINE 600 MG: 200 INHALANT RESPIRATORY (INHALATION) at 21:19

## 2024-11-08 RX ADMIN — FENTANYL CITRATE 100 MCG: 50 INJECTION, SOLUTION INTRAMUSCULAR; INTRAVENOUS at 09:26

## 2024-11-08 RX ADMIN — FENTANYL CITRATE 100 MCG: 50 INJECTION, SOLUTION INTRAMUSCULAR; INTRAVENOUS at 09:34

## 2024-11-08 RX ADMIN — OXYCODONE HYDROCHLORIDE 15 MG: 5 SOLUTION ORAL at 18:25

## 2024-11-08 RX ADMIN — Medication 175 MCG/HR: at 06:06

## 2024-11-08 RX ADMIN — CHLORHEXIDINE GLUCONATE, 0.12% ORAL RINSE 15 ML: 1.2 SOLUTION DENTAL at 21:00

## 2024-11-08 RX ADMIN — GABAPENTIN 300 MG: 300 CAPSULE ORAL at 14:15

## 2024-11-08 RX ADMIN — IPRATROPIUM BROMIDE AND ALBUTEROL SULFATE 1 DOSE: 2.5; .5 SOLUTION RESPIRATORY (INHALATION) at 11:41

## 2024-11-08 RX ADMIN — LABETALOL HYDROCHLORIDE 10 MG: 5 INJECTION INTRAVENOUS at 09:01

## 2024-11-08 RX ADMIN — MIDAZOLAM 4 MG: 1 INJECTION INTRAMUSCULAR; INTRAVENOUS at 08:45

## 2024-11-08 RX ADMIN — LABETALOL HYDROCHLORIDE 10 MG: 5 INJECTION, SOLUTION INTRAVENOUS at 09:01

## 2024-11-08 RX ADMIN — GUAIFENESIN 400 MG: 400 TABLET ORAL at 21:00

## 2024-11-08 RX ADMIN — IPRATROPIUM BROMIDE AND ALBUTEROL SULFATE 1 DOSE: 2.5; .5 SOLUTION RESPIRATORY (INHALATION) at 16:12

## 2024-11-08 RX ADMIN — MEROPENEM 1000 MG: 1 INJECTION INTRAVENOUS at 14:19

## 2024-11-08 RX ADMIN — INSULIN GLARGINE 30 UNITS: 100 INJECTION, SOLUTION SUBCUTANEOUS at 21:00

## 2024-11-08 RX ADMIN — FENTANYL CITRATE 100 MCG: 50 INJECTION INTRAMUSCULAR; INTRAVENOUS at 09:26

## 2024-11-08 RX ADMIN — Medication 175 MCG/HR: at 01:29

## 2024-11-08 RX ADMIN — INSULIN GLARGINE 30 UNITS: 100 INJECTION, SOLUTION SUBCUTANEOUS at 10:17

## 2024-11-08 RX ADMIN — FENTANYL CITRATE 100 MCG: 50 INJECTION, SOLUTION INTRAMUSCULAR; INTRAVENOUS at 08:56

## 2024-11-08 RX ADMIN — INSULIN LISPRO 4 UNITS: 100 INJECTION, SOLUTION INTRAVENOUS; SUBCUTANEOUS at 00:54

## 2024-11-08 RX ADMIN — SODIUM CHLORIDE 0.3 MG/KG/HR: 9 INJECTION, SOLUTION INTRAVENOUS at 00:56

## 2024-11-08 RX ADMIN — ACETAMINOPHEN 650 MG: 650 SOLUTION ORAL at 14:14

## 2024-11-08 RX ADMIN — IPRATROPIUM BROMIDE AND ALBUTEROL SULFATE 1 DOSE: 2.5; .5 SOLUTION RESPIRATORY (INHALATION) at 21:18

## 2024-11-08 RX ADMIN — METHOCARBAMOL 1000 MG: 500 TABLET ORAL at 10:17

## 2024-11-08 RX ADMIN — ENOXAPARIN SODIUM 60 MG: 100 INJECTION SUBCUTANEOUS at 10:31

## 2024-11-08 ASSESSMENT — PAIN SCALES - GENERAL
PAINLEVEL_OUTOF10: 0

## 2024-11-08 ASSESSMENT — PULMONARY FUNCTION TESTS
PIF_VALUE: 23
PIF_VALUE: 22
PIF_VALUE: 33
PIF_VALUE: 18
PIF_VALUE: 22
PIF_VALUE: 31
PIF_VALUE: 24
PIF_VALUE: 24
PIF_VALUE: 20
PIF_VALUE: 25
PIF_VALUE: 22
PIF_VALUE: 24
PIF_VALUE: 24
PIF_VALUE: 25
PIF_VALUE: 24
PIF_VALUE: 23
PIF_VALUE: 23
PIF_VALUE: 21
PIF_VALUE: 22
PIF_VALUE: 17
PIF_VALUE: 81
PIF_VALUE: 27
PIF_VALUE: 30
PIF_VALUE: 23
PIF_VALUE: 24
PIF_VALUE: 22
PIF_VALUE: 29
PIF_VALUE: 28
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
## 2024-11-08 NOTE — PROCEDURES
PROCEDURE NOTE  Date: 11/8/2024   Name: Glenroy Simmons II  YOB: 1975    Procedures Bronchoscopy with aspiration of the pulmonary tree    Bronchoscopy Procedure Note     Date of Operation: 11/8/2024     Pre-op Diagnosis: Hypoxia, respiratory failure     Post-op Diagnosis: Same    Surgeon: MD Karime      Assistants: None     Anesthesia: General endotracheal anesthesia     Operation: Flexible fiberoptic bronchoscopy, therapeutic     Findings: Scant secretions     Specimen: Right side sputum culture     Complications: none       Description of Procedure:   The patient was identified as Glenroy Simmons II and the procedure verified as Flexible Fiberoptic Bronchoscopy. A Time Out was held and the above information confirmed.     Within the SICU, the patient was sedated with Ketamine, versed as well Fentanyl. The bronchoscope was inserted through the patients ETT. The bronchoscope was passed through the ETT, which was noted to be in good position above the suzy. First the right main stem brochus was viewed. There were a Scant amount of thick secretions which were suctioned until clear. Irrigation with saline was undertaken to thin out secretions. The scope was slowly withdrawn and passed into the left mainstem bronchus. There were a minimal amount of thin secretions. Irrigation with saline was undertaken to thin out secretions.  The bronchoscope was completely withdrawn. The patient tolerated the procedure well with no issues with saturations.     Plan: Wean FiO2 as tolerated     Electronically signed by Yazan Schaffer MD on 11/8/2024 at 9:06 AM

## 2024-11-08 NOTE — PROGRESS NOTES
Patient intubated becoming anxious at times an may reach for ETT tube. Redirectable at times. For patient safety, will continue use of bilateral soft wrist restraints at this time. Will continue to assess and monitor for earliest removal.

## 2024-11-08 NOTE — PROGRESS NOTES
10/30  -Nonweightbearing bilateral lower extremities and right upper extremity  -Left tibial s/p ORIF 11/3     ++Hypernatremia- improving   - Continue enteral hydration and monitor Na      Nutrition- Tube feeds     Consults and recommendations   Ortho PLAN      Continue physical therapy and protocol: NWB - Bilateral Lower Extremity  Antibiotics per admitting service  Deep venous thrombosis prophylaxis - per SICU, early mobilization  PT/OT  Pain Control: IV and PO  Patient will require treatment for the left hip, however this will not be completed during this current hospital stay.  After consolidation of his fracture, he will require revision arthroplasty for the left hip after fracture healing.  He may follow-up in the orthopedic clinic 2 weeks after discharge from the hospital.     ++Pneumonia - E. coli heavy growth   - Continue Cefepime until 11/10 --> Switched to Merrem, pending new respiratory cultures      DVT prophylaxis--SCDS, heparin 7500 tid  GI Prophylaxis--Protonix  Lines--arterial line 10/24, PICC line 10/31  CODE: FULL     CBC:   Lab Results   Component Value Date/Time    WBC 24.4 11/08/2024 05:29 AM    RBC 2.67 11/08/2024 05:29 AM    HGB 7.8 11/08/2024 05:29 AM    HCT 26.7 11/08/2024 05:29 AM    .0 11/08/2024 05:29 AM    MCH 29.2 11/08/2024 05:29 AM    MCHC 29.2 11/08/2024 05:29 AM    RDW 19.6 11/08/2024 05:29 AM     11/08/2024 05:29 AM    MPV 10.8 11/08/2024 05:29 AM     CMP:    Lab Results   Component Value Date/Time     11/08/2024 11:19 AM    K 3.4 11/08/2024 05:29 AM     11/08/2024 05:29 AM    CO2 28 11/08/2024 05:29 AM    BUN 36 11/08/2024 05:29 AM    CREATININE 1.0 11/08/2024 05:29 AM    LABGLOM >90 11/08/2024 05:29 AM    GLUCOSE 147 11/08/2024 05:29 AM    CALCIUM 8.1 11/08/2024 05:29 AM    BILITOT 1.4 11/08/2024 05:29 AM    ALKPHOS 231 11/08/2024 05:29 AM    AST 54 11/08/2024 05:29 AM    ALT 74 11/08/2024 05:29 AM     ABG:    Lab Results   Component Value Date/Time     PH 7.467 11/07/2024 04:57 AM    PCO2 39.6 11/07/2024 04:57 AM    PO2 89.3 11/07/2024 04:57 AM    HCO3 28.0 11/07/2024 04:57 AM    BE 4.0 11/07/2024 04:57 AM    O2SAT 96.9 11/07/2024 04:57 AM     Imaging: AM CXR reviewed and my interpretation is he has an interval development of a left side small effusion.  Support devices are in appropriate position     Yazan Schaffer MD   Trauma/Critical care

## 2024-11-08 NOTE — PROCEDURES
PROCEDURE NOTE  Date: 11/8/2024   Name: Glenroy Simmons II  YOB: 1975    Procedures   EGD   PEG tube placement     SURGEON: MD Karime     ASSISTANT: DO Yovany     PREOPERATIVE DIAGNOSES: Malnutrition, dysphagia.     POSTOPERATIVE DIAGNOSES: Same    OPERATION: Percutaneous endoscopic gastrostomy tube placement with upper   endoscopy.     ANESTHESIA: LMAC.     ESTIMATED BLOOD LOSS: Minimal     COMPLICATIONS: None     SPECIMEN: None.     DESCRIPTION OF PROCEDURE: PROCEDURE: The patient was under local monitored anesthesia. A bite block was inserted.  A lubricated gastroscope was inserted into the oropharynx and advanced under  direct vision to the esophagus and then the stomach.      The pylorus and duodenum were intubated and appeared normal    The stomach was insufflated.  The abdominal wall was transilluminated, the left upper quadrant was prepared and draped. Local anesthetic was injected. An #11 blade was used to make a transverse incision. A snare forceps was inserted through the gastroscope and deployed into the gastric lumen. An angiocatheter was inserted through the incision into the gastric lumen under direct vision. A wire was inserted through the anterior catheter and grasped with the snare forceps. The gastroscope, snare, and wire were then pulled out through the patient's mouth. The gastrostomy tube was connected to the wire, and the wire was pulled through the stomach and out through the anterior abdominal wall. The gastroscope was reintroduced into the stomach. The PEG tube was seen in good position without evidence of bleeding.  The gastroscope was retrieved. The PEG tube was cut to size and fit with a bumper and a feeding apparatus. The patient tolerated the procedure well and went to the recovery room in a good condition.    Yazan Schaffer MD

## 2024-11-08 NOTE — PROGRESS NOTES
[facility]  MD Laron Muller MD Ali Hassan, MD Lisa Kniska, CNP   Lo Elmore, BLAIRE Davidson, CNP  Progress note   Patient's Name: Glenroy Simmons II  1:45 PM  11/8/2024        10/26 : seen in his room intubated mechanically ventilated fio2 90 peep 10 massively hyperovlemic , UO ok . No pressors .     10/27 seen in his room remain critically ill , mechanically ventilated no pressors .   We did try SLED yesterday and we could not achieve a good ultrafiltration due to lower blood pressure ,we did start him on a Bumex drip today and has been having excellent urine output in the range of 2  an hour per ICU nursing his azotemia is actually slightly better .  His oxygen requirement are higher and better ICU the plan is to do a CT scan    10/28: Seen in the SICU. Critically ill. Mechanically ventilated, FiO2 70 %. Receiving 1 unit PRBCs. He does open his eyes to voice. Remains hypervolemic. Urine output is excellent on Bumex drip. Not on any pressors.     10/29: Seen in the SICU. Remains critically ill. Mechanically ventilated via ETT. FiO2 90 %, PEEP 10. He does open his eyes to voice. Excellent urine output on Bumex drip, over 9 liters yesterday. Hypervolemia is improving. Blood pressure is stable.     10/30:  Remains critically ill.  Mechanically ventilated-->FiO2- 80% peep-10.  Continues on Bumex, Fentanyl, Levo and LR.  Bilateral chest tubes.  Going to OR with ortho.     10/31: Seen in the ICU. Critically ill. Mechanically ventilated. FiO2 75 %. He does open is eyes to voice.  Excellent urine output with Bumex drip. Chest tubes removed today.     11/1: Seen in the SICU. Mechanically ventilated and sedated. FiO2 is 70 %. He does open his eyes to voice. Rate of Bumex drip decreased today. Urine output remains excellent. Hypervolemia improving. Tolerating his tube feedings without issues. Brother is present at the bedside.     11/2: Seen in the ICU.  Remains  fractures of multiple anterior ribs bilaterally, right 2nd through 6th   and left 3rd through 7th. Notably there is displacement of the left 5th and   6th ribs.  No pneumothorax.      Right hip arthroplasty, with anterior subluxation/dislocation of the femoral   component with respect to the acetabuli component.  Acute comminuted   displaced fracture of the right acetabulum/iliac bone, with evidence of   active bleeding adjacently.      Left hip arthroplasty.  Acute comminuted displaced fracture of the left   acetabulum and periacetabular pubic bone, and suspect active bleeding   adjacently although lesser than on the right.  Acute comminuted fracture of   the proximal femur in the region of the greater trochanter.      Acute displaced fracture of the right inferior pubic ramus.      Blood in the extraperitoneal right pelvis anteriorly with some mass effect on   the bladder, laterally in the obturator region, posteriorly in the piriformis   region.  Suspect blood in the soft tissues along the right gluteal cleft at   the level of the coccyx.      No acute thoracolumbar spine process.      Left iliac vein stent appears non-opacified may reflect chronic thrombosis.      Additional observations as above.            XR PELVIS (1-2 VIEWS)   Final Result   1. Dislocation of the right total hip prosthesis.   2. Acute transverse fracture of the right iliac wing superior to the   acetabular component of the right hip prosthesis, with inward displacement of   the native osseous structures and the acetabular component.   3. Acute, mildly distracted, vertical fracture of the medial aspect of the   right inferior pubic ramus.   4. Suspected fracture of the lateral aspect of the right superior pubic ramus.         XR CHEST 1 VIEW   Final Result   No acute process.         XR CHEST PORTABLE    (Results Pending)       Assessment  Acute kidney injury secondary to prerenal azotemia. Hemodynamically mediated by hypotension, cardiac

## 2024-11-08 NOTE — PROGRESS NOTES
Physical Therapy  Physical Therapy Attempt    Name: Glenroy Simmons II  : 1975  MRN: [id number]      Date of Service: 2024  Chart reviewed.  Spoke with RN - pt is not medically appropriate for skilled PT at this time.  Will re-attempt as able.    Alex Gallardo, PT, DPT  [id number]

## 2024-11-08 NOTE — PLAN OF CARE
Problem: Safety - Medical Restraint  Goal: Remains free of injury from restraints (Restraint for Interference with Medical Device)  Description: INTERVENTIONS:  1. Determine that other, less restrictive measures have been tried or would not be effective before applying the restraint  2. Evaluate the patient's condition at the time of restraint application  3. Inform patient/family regarding the reason for restraint  4. Q2H: Monitor safety, psychosocial status, comfort, nutrition and hydration  11/8/2024 1351 by Krista Ceja RN  Outcome: Progressing     Problem: Skin/Tissue Integrity  Goal: Absence of new skin breakdown  Description: 1.  Monitor for areas of redness and/or skin breakdown  2.  Assess vascular access sites hourly  3.  Every 4-6 hours minimum:  Change oxygen saturation probe site  4.  Every 4-6 hours:  If on nasal continuous positive airway pressure, respiratory therapy assess nares and determine need for appliance change or resting period.  11/8/2024 1351 by Krista Ceja RN  Outcome: Not Progressing  11/8/2024 1346 by Sherice Griffith  Outcome: Not Progressing     Problem: Neurosensory - Adult  Goal: Achieves maximal functionality and self care  11/8/2024 1351 by Krista Ceja RN  Outcome: Not Progressing  11/8/2024 1346 by Sherice Griffith  Outcome: Not Progressing     Problem: Respiratory - Adult  Goal: Achieves optimal ventilation and oxygenation  11/8/2024 1351 by Krista Ceja RN  Outcome: Progressing  11/8/2024 1346 by Sherice Griffith  Outcome: Not Progressing     Problem: Cardiovascular - Adult  Goal: Maintains optimal cardiac output and hemodynamic stability  11/8/2024 1351 by Krista Ceja RN  Outcome: Progressing  11/8/2024 1346 by Sherice Griffith  Outcome: Not Progressing     Problem: Musculoskeletal - Adult  Goal: Return mobility to safest level of function  11/8/2024 1351 by Krista Ceja RN  Outcome: Not Progressing  11/8/2024 1346 by Sherice Griffith  Outcome: Not Progressing  Goal: Return
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
## 2024-11-09 LAB
AADO2: 301.7 MMHG
ALBUMIN SERPL-MCNC: 2.7 G/DL (ref 3.5–5.2)
ALP SERPL-CCNC: 225 U/L (ref 40–129)
ALT SERPL-CCNC: 62 U/L (ref 0–40)
ANION GAP SERPL CALCULATED.3IONS-SCNC: 10 MMOL/L (ref 7–16)
AST SERPL-CCNC: 46 U/L (ref 0–39)
B.E.: 3.3 MMOL/L (ref -3–3)
BASOPHILS # BLD: 0.04 K/UL (ref 0–0.2)
BASOPHILS # BLD: 0.05 K/UL (ref 0–0.2)
BASOPHILS NFR BLD: 0 % (ref 0–2)
BASOPHILS NFR BLD: 0 % (ref 0–2)
BILIRUB SERPL-MCNC: 1.3 MG/DL (ref 0–1.2)
BUN SERPL-MCNC: 32 MG/DL (ref 6–20)
CA-I BLD-SCNC: 1.13 MMOL/L (ref 1.15–1.33)
CALCIUM SERPL-MCNC: 7.8 MG/DL (ref 8.6–10.2)
CHLORIDE SERPL-SCNC: 108 MMOL/L (ref 98–107)
CO2 SERPL-SCNC: 26 MMOL/L (ref 22–29)
CREAT SERPL-MCNC: 1 MG/DL (ref 0.7–1.2)
CRITICAL: ABNORMAL
DATE ANALYZED: ABNORMAL
DATE OF COLLECTION: ABNORMAL
EKG ATRIAL RATE: 144 BPM
EKG Q-T INTERVAL: 306 MS
EKG QRS DURATION: 94 MS
EKG QTC CALCULATION (BAZETT): 465 MS
EKG R AXIS: 25 DEGREES
EKG T AXIS: 52 DEGREES
EKG VENTRICULAR RATE: 139 BPM
EOSINOPHIL # BLD: 0.32 K/UL (ref 0.05–0.5)
EOSINOPHIL # BLD: 0.34 K/UL (ref 0.05–0.5)
EOSINOPHILS RELATIVE PERCENT: 2 % (ref 0–6)
EOSINOPHILS RELATIVE PERCENT: 2 % (ref 0–6)
ERYTHROCYTE [DISTWIDTH] IN BLOOD BY AUTOMATED COUNT: 19.6 % (ref 11.5–15)
ERYTHROCYTE [DISTWIDTH] IN BLOOD BY AUTOMATED COUNT: 19.8 % (ref 11.5–15)
FIO2: 60 %
GFR, ESTIMATED: >90 ML/MIN/1.73M2
GLUCOSE BLD-MCNC: 100 MG/DL (ref 74–99)
GLUCOSE BLD-MCNC: 122 MG/DL (ref 74–99)
GLUCOSE BLD-MCNC: 76 MG/DL (ref 74–99)
GLUCOSE BLD-MCNC: 84 MG/DL (ref 74–99)
GLUCOSE BLD-MCNC: 88 MG/DL (ref 74–99)
GLUCOSE BLD-MCNC: 88 MG/DL (ref 74–99)
GLUCOSE SERPL-MCNC: 94 MG/DL (ref 74–99)
HCO3: 26.6 MMOL/L (ref 22–26)
HCT VFR BLD AUTO: 25.1 % (ref 37–54)
HCT VFR BLD AUTO: 25.3 % (ref 37–54)
HGB BLD-MCNC: 7.3 G/DL (ref 12.5–16.5)
HGB BLD-MCNC: 7.4 G/DL (ref 12.5–16.5)
IMM GRANULOCYTES # BLD AUTO: 0.17 K/UL (ref 0–0.58)
IMM GRANULOCYTES # BLD AUTO: 0.21 K/UL (ref 0–0.58)
IMM GRANULOCYTES NFR BLD: 1 % (ref 0–5)
IMM GRANULOCYTES NFR BLD: 1 % (ref 0–5)
LAB: ABNORMAL
LYMPHOCYTES NFR BLD: 1.67 K/UL (ref 1.5–4)
LYMPHOCYTES NFR BLD: 1.84 K/UL (ref 1.5–4)
LYMPHOCYTES RELATIVE PERCENT: 9 % (ref 20–42)
LYMPHOCYTES RELATIVE PERCENT: 9 % (ref 20–42)
Lab: 615
MAGNESIUM SERPL-MCNC: 2.2 MG/DL (ref 1.6–2.6)
MCH RBC QN AUTO: 29.3 PG (ref 26–35)
MCH RBC QN AUTO: 29.8 PG (ref 26–35)
MCHC RBC AUTO-ENTMCNC: 28.9 G/DL (ref 32–34.5)
MCHC RBC AUTO-ENTMCNC: 29.5 G/DL (ref 32–34.5)
MCV RBC AUTO: 101.2 FL (ref 80–99.9)
MCV RBC AUTO: 101.6 FL (ref 80–99.9)
MICROORGANISM SPEC CULT: NORMAL
MICROORGANISM/AGENT SPEC: NORMAL
MODE: AC
MONOCYTES NFR BLD: 1.25 K/UL (ref 0.1–0.95)
MONOCYTES NFR BLD: 1.36 K/UL (ref 0.1–0.95)
MONOCYTES NFR BLD: 6 % (ref 2–12)
MONOCYTES NFR BLD: 7 % (ref 2–12)
NEUTROPHILS NFR BLD: 81 % (ref 43–80)
NEUTROPHILS NFR BLD: 81 % (ref 43–80)
NEUTS SEG NFR BLD: 15.13 K/UL (ref 1.8–7.3)
NEUTS SEG NFR BLD: 15.96 K/UL (ref 1.8–7.3)
O2 SATURATION: 97.1 % (ref 92–98.5)
OPERATOR ID: 2420
PATIENT TEMP: 37 C
PCO2: 36.5 MMHG (ref 35–45)
PEEP/CPAP: 10 CMH2O
PFO2: 1.43 MMHG/%
PH BLOOD GAS: 7.48 (ref 7.35–7.45)
PHOSPHATE SERPL-MCNC: 2.3 MG/DL (ref 2.5–4.5)
PLATELET # BLD AUTO: 623 K/UL (ref 130–450)
PLATELET # BLD AUTO: 647 K/UL (ref 130–450)
PMV BLD AUTO: 10.4 FL (ref 7–12)
PMV BLD AUTO: 10.4 FL (ref 7–12)
PO2: 86 MMHG (ref 75–100)
POTASSIUM SERPL-SCNC: 3.4 MMOL/L (ref 3.5–5)
PROT SERPL-MCNC: 5.4 G/DL (ref 6.4–8.3)
RBC # BLD AUTO: 2.48 M/UL (ref 3.8–5.8)
RBC # BLD AUTO: 2.49 M/UL (ref 3.8–5.8)
RBC # BLD: ABNORMAL 10*6/UL
RI(T): 3.51
RR MECHANICAL: 16 B/MIN
SODIUM SERPL-SCNC: 144 MMOL/L (ref 132–146)
SODIUM SERPL-SCNC: 149 MMOL/L (ref 132–146)
SODIUM SERPL-SCNC: 152 MMOL/L (ref 132–146)
SOURCE, BLOOD GAS: ABNORMAL
SPECIMEN DESCRIPTION: NORMAL
TIME ANALYZED: 620
VT MECHANICAL: 500 ML
WBC OTHER # BLD: 18.7 K/UL (ref 4.5–11.5)
WBC OTHER # BLD: 19.6 K/UL (ref 4.5–11.5)

## 2024-11-09 PROCEDURE — 82803 BLOOD GASES ANY COMBINATION: CPT

## 2024-11-09 PROCEDURE — 82330 ASSAY OF CALCIUM: CPT

## 2024-11-09 PROCEDURE — 84100 ASSAY OF PHOSPHORUS: CPT

## 2024-11-09 PROCEDURE — 93010 ELECTROCARDIOGRAM REPORT: CPT | Performed by: INTERNAL MEDICINE

## 2024-11-09 PROCEDURE — 82962 GLUCOSE BLOOD TEST: CPT

## 2024-11-09 PROCEDURE — 80053 COMPREHEN METABOLIC PANEL: CPT

## 2024-11-09 PROCEDURE — 2000000000 HC ICU R&B

## 2024-11-09 PROCEDURE — 36592 COLLECT BLOOD FROM PICC: CPT

## 2024-11-09 PROCEDURE — 6370000000 HC RX 637 (ALT 250 FOR IP)

## 2024-11-09 PROCEDURE — 83735 ASSAY OF MAGNESIUM: CPT

## 2024-11-09 PROCEDURE — 37799 UNLISTED PX VASCULAR SURGERY: CPT

## 2024-11-09 PROCEDURE — 99291 CRITICAL CARE FIRST HOUR: CPT | Performed by: STUDENT IN AN ORGANIZED HEALTH CARE EDUCATION/TRAINING PROGRAM

## 2024-11-09 PROCEDURE — 6360000002 HC RX W HCPCS

## 2024-11-09 PROCEDURE — 84295 ASSAY OF SERUM SODIUM: CPT

## 2024-11-09 PROCEDURE — 2580000003 HC RX 258

## 2024-11-09 PROCEDURE — 94003 VENT MGMT INPAT SUBQ DAY: CPT

## 2024-11-09 PROCEDURE — 6370000000 HC RX 637 (ALT 250 FOR IP): Performed by: INTERNAL MEDICINE

## 2024-11-09 PROCEDURE — 94640 AIRWAY INHALATION TREATMENT: CPT

## 2024-11-09 RX ADMIN — OXYCODONE HYDROCHLORIDE 15 MG: 5 SOLUTION ORAL at 18:07

## 2024-11-09 RX ADMIN — INSULIN GLARGINE 30 UNITS: 100 INJECTION, SOLUTION SUBCUTANEOUS at 09:10

## 2024-11-09 RX ADMIN — MEROPENEM 1000 MG: 1 INJECTION INTRAVENOUS at 05:19

## 2024-11-09 RX ADMIN — Medication 250 MG: at 18:02

## 2024-11-09 RX ADMIN — CYCLOBENZAPRINE 10 MG: 10 TABLET, FILM COATED ORAL at 20:03

## 2024-11-09 RX ADMIN — OXYCODONE HYDROCHLORIDE 15 MG: 5 SOLUTION ORAL at 12:59

## 2024-11-09 RX ADMIN — GUAIFENESIN 400 MG: 400 TABLET ORAL at 20:03

## 2024-11-09 RX ADMIN — ACETYLCYSTEINE 600 MG: 200 INHALANT RESPIRATORY (INHALATION) at 19:25

## 2024-11-09 RX ADMIN — GABAPENTIN 300 MG: 300 CAPSULE ORAL at 13:00

## 2024-11-09 RX ADMIN — MEROPENEM 1000 MG: 1 INJECTION INTRAVENOUS at 21:14

## 2024-11-09 RX ADMIN — IPRATROPIUM BROMIDE AND ALBUTEROL SULFATE 1 DOSE: 2.5; .5 SOLUTION RESPIRATORY (INHALATION) at 16:08

## 2024-11-09 RX ADMIN — GUAIFENESIN 400 MG: 400 TABLET ORAL at 13:00

## 2024-11-09 RX ADMIN — LORAZEPAM 1 MG: 2 INJECTION INTRAMUSCULAR; INTRAVENOUS at 01:01

## 2024-11-09 RX ADMIN — PANTOPRAZOLE SODIUM 40 MG: 40 TABLET, DELAYED RELEASE ORAL at 20:03

## 2024-11-09 RX ADMIN — LORAZEPAM 1 MG: 2 INJECTION INTRAMUSCULAR; INTRAVENOUS at 23:14

## 2024-11-09 RX ADMIN — Medication 250 MG: at 12:59

## 2024-11-09 RX ADMIN — GUAIFENESIN 400 MG: 400 TABLET ORAL at 08:25

## 2024-11-09 RX ADMIN — GABAPENTIN 300 MG: 300 CAPSULE ORAL at 21:14

## 2024-11-09 RX ADMIN — CYCLOBENZAPRINE 10 MG: 10 TABLET, FILM COATED ORAL at 13:00

## 2024-11-09 RX ADMIN — PANTOPRAZOLE SODIUM 40 MG: 40 TABLET, DELAYED RELEASE ORAL at 08:28

## 2024-11-09 RX ADMIN — OXYCODONE HYDROCHLORIDE 15 MG: 5 SOLUTION ORAL at 06:10

## 2024-11-09 RX ADMIN — GABAPENTIN 300 MG: 300 CAPSULE ORAL at 08:25

## 2024-11-09 RX ADMIN — OXYCODONE HYDROCHLORIDE 15 MG: 5 SOLUTION ORAL at 01:01

## 2024-11-09 RX ADMIN — ENOXAPARIN SODIUM 60 MG: 100 INJECTION SUBCUTANEOUS at 21:14

## 2024-11-09 RX ADMIN — HYDROMORPHONE HYDROCHLORIDE 1 MG: 1 INJECTION, SOLUTION INTRAMUSCULAR; INTRAVENOUS; SUBCUTANEOUS at 10:32

## 2024-11-09 RX ADMIN — IPRATROPIUM BROMIDE AND ALBUTEROL SULFATE 1 DOSE: 2.5; .5 SOLUTION RESPIRATORY (INHALATION) at 19:24

## 2024-11-09 RX ADMIN — ACETAMINOPHEN 650 MG: 650 SOLUTION ORAL at 01:01

## 2024-11-09 RX ADMIN — LORAZEPAM 1 MG: 2 INJECTION INTRAMUSCULAR; INTRAVENOUS at 13:10

## 2024-11-09 RX ADMIN — ACETYLCYSTEINE 600 MG: 200 INHALANT RESPIRATORY (INHALATION) at 08:23

## 2024-11-09 RX ADMIN — ACETAMINOPHEN 650 MG: 650 SOLUTION ORAL at 20:04

## 2024-11-09 RX ADMIN — IPRATROPIUM BROMIDE AND ALBUTEROL SULFATE 1 DOSE: 2.5; .5 SOLUTION RESPIRATORY (INHALATION) at 08:23

## 2024-11-09 RX ADMIN — CYCLOBENZAPRINE 10 MG: 10 TABLET, FILM COATED ORAL at 08:25

## 2024-11-09 RX ADMIN — ACETAMINOPHEN 650 MG: 650 SOLUTION ORAL at 08:25

## 2024-11-09 RX ADMIN — Medication 250 MG: at 08:25

## 2024-11-09 RX ADMIN — MEROPENEM 1000 MG: 1 INJECTION INTRAVENOUS at 13:16

## 2024-11-09 RX ADMIN — IPRATROPIUM BROMIDE AND ALBUTEROL SULFATE 1 DOSE: 2.5; .5 SOLUTION RESPIRATORY (INHALATION) at 11:52

## 2024-11-09 RX ADMIN — LORAZEPAM 1 MG: 2 INJECTION INTRAMUSCULAR; INTRAVENOUS at 08:20

## 2024-11-09 RX ADMIN — LORAZEPAM 1 MG: 2 INJECTION INTRAMUSCULAR; INTRAVENOUS at 18:08

## 2024-11-09 RX ADMIN — CHLORHEXIDINE GLUCONATE, 0.12% ORAL RINSE 15 ML: 1.2 SOLUTION DENTAL at 08:25

## 2024-11-09 RX ADMIN — ACETAMINOPHEN 650 MG: 650 SOLUTION ORAL at 14:59

## 2024-11-09 RX ADMIN — BACITRACIN ZINC: 500 OINTMENT TOPICAL at 08:14

## 2024-11-09 RX ADMIN — ENOXAPARIN SODIUM 60 MG: 100 INJECTION SUBCUTANEOUS at 09:10

## 2024-11-09 RX ADMIN — CHLORHEXIDINE GLUCONATE, 0.12% ORAL RINSE 15 ML: 1.2 SOLUTION DENTAL at 20:03

## 2024-11-09 ASSESSMENT — PAIN SCALES - GENERAL
PAINLEVEL_OUTOF10: 3
PAINLEVEL_OUTOF10: 3
PAINLEVEL_OUTOF10: 5
PAINLEVEL_OUTOF10: 5
PAINLEVEL_OUTOF10: 3
PAINLEVEL_OUTOF10: 0
PAINLEVEL_OUTOF10: 0

## 2024-11-09 ASSESSMENT — PULMONARY FUNCTION TESTS
PIF_VALUE: 40
PIF_VALUE: 30
PIF_VALUE: 18
PIF_VALUE: 22
PIF_VALUE: 21
PIF_VALUE: 17
PIF_VALUE: 18
PIF_VALUE: 19
PIF_VALUE: 18
PIF_VALUE: 16
PIF_VALUE: 20
PIF_VALUE: 18
PIF_VALUE: 38
PIF_VALUE: 33
PIF_VALUE: 22
PIF_VALUE: 20
PIF_VALUE: 24
PIF_VALUE: 18
PIF_VALUE: 17
PIF_VALUE: 14
PIF_VALUE: 20
PIF_VALUE: 18
PIF_VALUE: 18
PIF_VALUE: 19
PIF_VALUE: 15
PIF_VALUE: 20
PIF_VALUE: 18
PIF_VALUE: 21
PIF_VALUE: 24
PIF_VALUE: 24
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
## 2024-11-09 NOTE — PLAN OF CARE
Problem: Respiratory - Adult  Goal: Achieves optimal ventilation and oxygenation  11/9/2024 1158 by Simerlink, Patricia, RCP  Outcome: Progressing     Problem: Skin/Tissue Integrity  Goal: Absence of new skin breakdown  Description: 1.  Monitor for areas of redness and/or skin breakdown  2.  Assess vascular access sites hourly  3.  Every 4-6 hours minimum:  Change oxygen saturation probe site  4.  Every 4-6 hours:  If on nasal continuous positive airway pressure, respiratory therapy assess nares and determine need for appliance change or resting period.  11/9/2024 1148 by Diogo Villanueva, RN  Outcome: Progressing  11/8/2024 2333 by Jada Ceja RN  Outcome: Not Progressing     Problem: Neurosensory - Adult  Goal: Achieves maximal functionality and self care  11/8/2024 2333 by Jada Ceja RN  Outcome: Not Progressing     Problem: Anxiety  Goal: Will report anxiety at manageable levels  Description: INTERVENTIONS:  1. Administer medication as ordered  2. Teach and rehearse alternative coping skills  3. Provide emotional support with 1:1 interaction with staff  11/9/2024 1148 by Diogo Villanueva, RN  Outcome: Not Progressing

## 2024-11-09 NOTE — PLAN OF CARE
Problem: Anxiety  Goal: Will report anxiety at manageable levels  Description: INTERVENTIONS:  1. Administer medication as ordered  2. Teach and rehearse alternative coping skills  3. Provide emotional support with 1:1 interaction with staff  Outcome: Not Progressing     Problem: Discharge Planning  Goal: Discharge to home or other facility with appropriate resources  11/9/2024 1148 by Diogo Villanueva RN  Outcome: Progressing     Problem: Pain  Goal: Verbalizes/displays adequate comfort level or baseline comfort level  11/9/2024 1148 by Diogo Villanueva RN  Outcome: Progressing     Problem: Safety - Adult  Goal: Free from fall injury  11/9/2024 1148 by Diogo Villanueva RN  Outcome: Progressing     Problem: Skin/Tissue Integrity  Goal: Absence of new skin breakdown  Description: 1.  Monitor for areas of redness and/or skin breakdown  2.  Assess vascular access sites hourly  3.  Every 4-6 hours minimum:  Change oxygen saturation probe site  4.  Every 4-6 hours:  If on nasal continuous positive airway pressure, respiratory therapy assess nares and determine need for appliance change or resting period.  11/9/2024 1148 by Diogo Villanueva RN  Outcome: Progressing     Problem: ABCDS Injury Assessment  Goal: Absence of physical injury  11/9/2024 1148 by Diogo Villanueva RN  Outcome: Progressing     Problem: Neurosensory - Adult  Goal: Achieves stable or improved neurological status  Outcome: Progressing     Problem: Respiratory - Adult  Goal: Achieves optimal ventilation and oxygenation  11/9/2024 1148 by Diogo Villanueva RN  Outcome: Progressing     Problem: Gastrointestinal - Adult  Goal: Minimal or absence of nausea and vomiting  Outcome: Progressing     Problem: Genitourinary - Adult  Goal: Absence of urinary retention  Outcome: Progressing     Problem: Genitourinary - Adult  Goal: Urinary catheter remains patent  Outcome: Progressing     Problem: Metabolic/Fluid and Electrolytes - Adult  Goal: Electrolytes maintained within normal  limits  Outcome: Progressing     Problem: Metabolic/Fluid and Electrolytes - Adult  Goal: Glucose maintained within prescribed range  Outcome: Progressing     Problem: Nutrition Deficit:  Goal: Optimize nutritional status  Outcome: Progressing     Problem: Skin/Tissue Integrity  Goal: Absence of new skin breakdown  Description: 1.  Monitor for areas of redness and/or skin breakdown  2.  Assess vascular access sites hourly  3.  Every 4-6 hours minimum:  Change oxygen saturation probe site  4.  Every 4-6 hours:  If on nasal continuous positive airway pressure, respiratory therapy assess nares and determine need for appliance change or resting period.  11/9/2024 1148 by Diogo Villanueva, RN  Outcome: Progressing  11/8/2024 2333 by Jada Ceja, RN  Outcome: Not Progressing     Problem: Neurosensory - Adult  Goal: Achieves maximal functionality and self care  11/8/2024 2333 by Jada Ceja, RN  Outcome: Not Progressing     Problem: Anxiety  Goal: Will report anxiety at manageable levels  Description: INTERVENTIONS:  1. Administer medication as ordered  2. Teach and rehearse alternative coping skills  3. Provide emotional support with 1:1 interaction with staff  Outcome: Not Progressing

## 2024-11-09 NOTE — PROGRESS NOTES
DAILY VENTILATOR WEANING ASSESSMENT PERFORMED    P/FIO2 Ratio =    143      (<100= do not Wean)                  Cs =        33                  (<32= Instability)  Plat. Pressure = 15  MV = 10  RSBI =    Instabilities:       Cardiovascular =       CNS =       Respiratory = R1       Metabolic =    Parameters    no    Wean per protocol  no    Ask Physician for a weaning plan yes    Additional Comments:     Performed by Patricia Simerlink, RCP RRT      Reference Table:    Cardiovascular     CNS      1. Mean BP less than or equal to 75   1. Neuromuscular blockade  2. Heart Rate greater than 130   2. RASS of -3, -4, -5  3. Myocardial Ischemia    3. RASS of +3, +4  4. Mechanical Assist Device    4. ICP greater than 15 or             Intracranial Hypertension         Respiratory      Metabolic  1. PEEP equal to or greater than 10cm/H20  1.Temp. (8hrs) less than 95 or > 103  2. Respiratory Rate greater than 35   2. WBC < 5000 or > 56955  3. Minute Volume greater than 15L  4. pH less than 7.30  5. Deteriorating chest X-ray

## 2024-11-09 NOTE — PROGRESS NOTES
[facility]  MD Laron Muller, MD Zhane Sharma, CNP   Lo Elmore, BLAIRE Davidson, CNP  Progress note   Patient's Name: Glenroy Simmons II  10:37 AM  11/9/2024        10/26 : seen in his room intubated mechanically ventilated fio2 90 peep 10 massively hyperovlemic , UO ok . No pressors .     10/27 seen in his room remain critically ill , mechanically ventilated no pressors .   We did try SLED yesterday and we could not achieve a good ultrafiltration due to lower blood pressure ,we did start him on a Bumex drip today and has been having excellent urine output in the range of 2  an hour per ICU nursing his azotemia is actually slightly better .  His oxygen requirement are higher and better ICU the plan is to do a CT scan    10/28: Seen in the SICU. Critically ill. Mechanically ventilated, FiO2 70 %. Receiving 1 unit PRBCs. He does open his eyes to voice. Remains hypervolemic. Urine output is excellent on Bumex drip. Not on any pressors.     10/29: Seen in the SICU. Remains critically ill. Mechanically ventilated via ETT. FiO2 90 %, PEEP 10. He does open his eyes to voice. Excellent urine output on Bumex drip, over 9 liters yesterday. Hypervolemia is improving. Blood pressure is stable.     10/30:  Remains critically ill.  Mechanically ventilated-->FiO2- 80% peep-10.  Continues on Bumex, Fentanyl, Levo and LR.  Bilateral chest tubes.  Going to OR with ortho.     10/31: Seen in the ICU. Critically ill. Mechanically ventilated. FiO2 75 %. He does open is eyes to voice.  Excellent urine output with Bumex drip. Chest tubes removed today.     11/1: Seen in the SICU. Mechanically ventilated and sedated. FiO2 is 70 %. He does open his eyes to voice. Rate of Bumex drip decreased today. Urine output remains excellent. Hypervolemia improving. Tolerating his tube feedings without issues. Brother is present at the bedside.     11/2: Seen in the ICU.  Remains  Result   1. Markedly comminuted fracture of the lateral tibial plateau.   2. Mild non depressed fracture through the medial tibial plateau.   3. Moderate lipohemarthrosis of the knee joint.         XR KNEE LEFT (1-2 VIEWS)   Final Result   1. Markedly comminuted fracture of the lateral tibial plateau.   2. Mild non depressed fracture through the medial tibial plateau.   3. Moderate lipohemarthrosis of the knee joint.         XR CHEST PORTABLE   Final Result   Interim placement of left pleural tube. No visible left pneumothorax or   effusion.         CT CHEST W CONTRAST   Final Result   1. Interim demonstration pelvic embolization coils in the region of the right   superior gluteal artery.  Mild geographic density is present through the   right gluteus medius muscle belly with associated thickening, which is mildly   increased in comparison to the prior examination.  This likely represents   mild residual clot or residual injected contrast, however, active   extravasation cannot be entirely excluded.  Careful clinical correlation and   follow up recommended.   2. Mild/moderate increasing retroperitoneal extraperitoneal pelvic hematomas   without evidence for active extravasation or pseudoaneurysm formation.   3. Interim development of large left pleural effusion with complete collapse   of the left lower lobe and partial collapse of the left upper lobe and small   right effusion with partial collapse of the right lower lobe.   4. Unchanged configuration of multiple bilateral anterior rib fractures,   right 2 through 6 and left 3 through 7. Unchanged displacement of the left   5th and 6th rib fractures anterolaterally. No adjacent subcutaneous emphysema   or pneumothorax.   5. Comminuted bilateral acetabular pelvic fractures, status post bilateral   total hip arthroplasty. There is anterior displacement/dislocation of the   right femoral head component relative to the right acetabular component,   unchanged from the

## 2024-11-10 LAB
AADO2: 192.7 MMHG
ALBUMIN SERPL-MCNC: 2.7 G/DL (ref 3.5–5.2)
ALP SERPL-CCNC: 245 U/L (ref 40–129)
ALT SERPL-CCNC: 65 U/L (ref 0–40)
ANION GAP SERPL CALCULATED.3IONS-SCNC: 8 MMOL/L (ref 7–16)
AST SERPL-CCNC: 55 U/L (ref 0–39)
B.E.: 3.7 MMOL/L (ref -3–3)
BASOPHILS # BLD: 0.03 K/UL (ref 0–0.2)
BASOPHILS NFR BLD: 0 % (ref 0–2)
BILIRUB SERPL-MCNC: 1.2 MG/DL (ref 0–1.2)
BILIRUB UR QL STRIP: NEGATIVE
BUN SERPL-MCNC: 24 MG/DL (ref 6–20)
CA-I BLD-SCNC: 1.12 MMOL/L (ref 1.15–1.33)
CALCIUM SERPL-MCNC: 7.9 MG/DL (ref 8.6–10.2)
CHLORIDE SERPL-SCNC: 111 MMOL/L (ref 98–107)
CLARITY UR: ABNORMAL
CO2 SERPL-SCNC: 27 MMOL/L (ref 22–29)
COHB: 1.4 % (ref 0–1.5)
COLOR UR: YELLOW
CREAT SERPL-MCNC: 0.8 MG/DL (ref 0.7–1.2)
CRITICAL: ABNORMAL
DATE ANALYZED: ABNORMAL
DATE OF COLLECTION: ABNORMAL
EOSINOPHIL # BLD: 0.29 K/UL (ref 0.05–0.5)
EOSINOPHILS RELATIVE PERCENT: 2 % (ref 0–6)
ERYTHROCYTE [DISTWIDTH] IN BLOOD BY AUTOMATED COUNT: 19.8 % (ref 11.5–15)
FIO2: 45 %
GFR, ESTIMATED: >90 ML/MIN/1.73M2
GLUCOSE BLD-MCNC: 100 MG/DL (ref 74–99)
GLUCOSE BLD-MCNC: 111 MG/DL (ref 74–99)
GLUCOSE BLD-MCNC: 115 MG/DL (ref 74–99)
GLUCOSE BLD-MCNC: 117 MG/DL (ref 74–99)
GLUCOSE BLD-MCNC: 78 MG/DL (ref 74–99)
GLUCOSE BLD-MCNC: 90 MG/DL (ref 74–99)
GLUCOSE SERPL-MCNC: 91 MG/DL (ref 74–99)
GLUCOSE UR STRIP-MCNC: NEGATIVE MG/DL
HCO3: 27.1 MMOL/L (ref 22–26)
HCT VFR BLD AUTO: 27 % (ref 37–54)
HGB BLD-MCNC: 8 G/DL (ref 12.5–16.5)
HGB UR QL STRIP.AUTO: ABNORMAL
HHB: 2.5 % (ref 0–5)
IMM GRANULOCYTES # BLD AUTO: 0.14 K/UL (ref 0–0.58)
IMM GRANULOCYTES NFR BLD: 1 % (ref 0–5)
KETONES UR STRIP-MCNC: NEGATIVE MG/DL
LAB: ABNORMAL
LEUKOCYTE ESTERASE UR QL STRIP: NEGATIVE
LYMPHOCYTES NFR BLD: 1.45 K/UL (ref 1.5–4)
LYMPHOCYTES RELATIVE PERCENT: 8 % (ref 20–42)
Lab: 420
MAGNESIUM SERPL-MCNC: 2.1 MG/DL (ref 1.6–2.6)
MCH RBC QN AUTO: 29.5 PG (ref 26–35)
MCHC RBC AUTO-ENTMCNC: 29.6 G/DL (ref 32–34.5)
MCV RBC AUTO: 99.6 FL (ref 80–99.9)
METHB: 1.5 % (ref 0–1.5)
MODE: AC
MONOCYTES NFR BLD: 1.32 K/UL (ref 0.1–0.95)
MONOCYTES NFR BLD: 7 % (ref 2–12)
NEUTROPHILS NFR BLD: 82 % (ref 43–80)
NEUTS SEG NFR BLD: 14.99 K/UL (ref 1.8–7.3)
NITRITE UR QL STRIP: NEGATIVE
O2 SATURATION: 97.4 % (ref 92–98.5)
O2HB: 94.6 % (ref 94–97)
OPERATOR ID: ABNORMAL
PATIENT TEMP: 37 C
PCO2: 36.1 MMHG (ref 35–45)
PEEP/CPAP: 10 CMH2O
PFO2: 1.94 MMHG/%
PH BLOOD GAS: 7.49 (ref 7.35–7.45)
PH UR STRIP: 6 [PH] (ref 5–9)
PHOSPHATE SERPL-MCNC: 2 MG/DL (ref 2.5–4.5)
PLATELET # BLD AUTO: 730 K/UL (ref 130–450)
PMV BLD AUTO: 10.9 FL (ref 7–12)
PO2: 87.1 MMHG (ref 75–100)
POTASSIUM SERPL-SCNC: 3.4 MMOL/L (ref 3.5–5)
PROT SERPL-MCNC: 5.6 G/DL (ref 6.4–8.3)
PROT UR STRIP-MCNC: 100 MG/DL
RBC # BLD AUTO: 2.71 M/UL (ref 3.8–5.8)
RBC #/AREA URNS HPF: ABNORMAL /HPF
RI(T): 2.21
RR MECHANICAL: 16 B/MIN
SODIUM SERPL-SCNC: 146 MMOL/L (ref 132–146)
SOURCE, BLOOD GAS: ABNORMAL
SP GR UR STRIP: 1.01 (ref 1–1.03)
THB: 9.2 G/DL (ref 11.5–16.5)
TIME ANALYZED: 426
UROBILINOGEN UR STRIP-ACNC: 0.2 EU/DL (ref 0–1)
VT MECHANICAL: 500 ML
WBC #/AREA URNS HPF: ABNORMAL /HPF
WBC OTHER # BLD: 18.2 K/UL (ref 4.5–11.5)

## 2024-11-10 PROCEDURE — 2000000000 HC ICU R&B

## 2024-11-10 PROCEDURE — 87040 BLOOD CULTURE FOR BACTERIA: CPT

## 2024-11-10 PROCEDURE — 84100 ASSAY OF PHOSPHORUS: CPT

## 2024-11-10 PROCEDURE — 81001 URINALYSIS AUTO W/SCOPE: CPT

## 2024-11-10 PROCEDURE — 6370000000 HC RX 637 (ALT 250 FOR IP)

## 2024-11-10 PROCEDURE — 82330 ASSAY OF CALCIUM: CPT

## 2024-11-10 PROCEDURE — 83735 ASSAY OF MAGNESIUM: CPT

## 2024-11-10 PROCEDURE — 99291 CRITICAL CARE FIRST HOUR: CPT | Performed by: STUDENT IN AN ORGANIZED HEALTH CARE EDUCATION/TRAINING PROGRAM

## 2024-11-10 PROCEDURE — 36415 COLL VENOUS BLD VENIPUNCTURE: CPT

## 2024-11-10 PROCEDURE — 94640 AIRWAY INHALATION TREATMENT: CPT

## 2024-11-10 PROCEDURE — 2580000003 HC RX 258

## 2024-11-10 PROCEDURE — 85025 COMPLETE CBC W/AUTO DIFF WBC: CPT

## 2024-11-10 PROCEDURE — 37799 UNLISTED PX VASCULAR SURGERY: CPT

## 2024-11-10 PROCEDURE — 6360000002 HC RX W HCPCS

## 2024-11-10 PROCEDURE — 94003 VENT MGMT INPAT SUBQ DAY: CPT

## 2024-11-10 PROCEDURE — 80053 COMPREHEN METABOLIC PANEL: CPT

## 2024-11-10 PROCEDURE — 82962 GLUCOSE BLOOD TEST: CPT

## 2024-11-10 PROCEDURE — 82805 BLOOD GASES W/O2 SATURATION: CPT

## 2024-11-10 RX ORDER — CALCIUM GLUCONATE 20 MG/ML
1000 INJECTION, SOLUTION INTRAVENOUS ONCE
Status: COMPLETED | OUTPATIENT
Start: 2024-11-10 | End: 2024-11-10

## 2024-11-10 RX ADMIN — OXYCODONE HYDROCHLORIDE 15 MG: 5 SOLUTION ORAL at 19:01

## 2024-11-10 RX ADMIN — ACETAMINOPHEN 650 MG: 650 SOLUTION ORAL at 08:38

## 2024-11-10 RX ADMIN — OXYCODONE HYDROCHLORIDE 15 MG: 5 SOLUTION ORAL at 12:30

## 2024-11-10 RX ADMIN — IPRATROPIUM BROMIDE AND ALBUTEROL SULFATE 1 DOSE: 2.5; .5 SOLUTION RESPIRATORY (INHALATION) at 08:13

## 2024-11-10 RX ADMIN — ACETYLCYSTEINE 600 MG: 200 INHALANT RESPIRATORY (INHALATION) at 19:39

## 2024-11-10 RX ADMIN — ACETAMINOPHEN 650 MG: 650 SOLUTION ORAL at 20:17

## 2024-11-10 RX ADMIN — CYCLOBENZAPRINE 10 MG: 10 TABLET, FILM COATED ORAL at 20:18

## 2024-11-10 RX ADMIN — MEROPENEM 1000 MG: 1 INJECTION INTRAVENOUS at 12:25

## 2024-11-10 RX ADMIN — ACETYLCYSTEINE 600 MG: 200 INHALANT RESPIRATORY (INHALATION) at 08:12

## 2024-11-10 RX ADMIN — GABAPENTIN 300 MG: 300 CAPSULE ORAL at 08:37

## 2024-11-10 RX ADMIN — IPRATROPIUM BROMIDE AND ALBUTEROL SULFATE 1 DOSE: 2.5; .5 SOLUTION RESPIRATORY (INHALATION) at 19:39

## 2024-11-10 RX ADMIN — MEROPENEM 1000 MG: 1 INJECTION INTRAVENOUS at 04:29

## 2024-11-10 RX ADMIN — CYCLOBENZAPRINE 10 MG: 10 TABLET, FILM COATED ORAL at 08:36

## 2024-11-10 RX ADMIN — Medication 500 MG: at 20:18

## 2024-11-10 RX ADMIN — CALCIUM GLUCONATE 1000 MG: 20 INJECTION, SOLUTION INTRAVENOUS at 11:03

## 2024-11-10 RX ADMIN — ENOXAPARIN SODIUM 60 MG: 100 INJECTION SUBCUTANEOUS at 20:31

## 2024-11-10 RX ADMIN — ENOXAPARIN SODIUM 60 MG: 100 INJECTION SUBCUTANEOUS at 08:38

## 2024-11-10 RX ADMIN — LORAZEPAM 1 MG: 2 INJECTION INTRAMUSCULAR; INTRAVENOUS at 06:27

## 2024-11-10 RX ADMIN — PANTOPRAZOLE SODIUM 40 MG: 40 TABLET, DELAYED RELEASE ORAL at 20:17

## 2024-11-10 RX ADMIN — Medication 500 MG: at 11:03

## 2024-11-10 RX ADMIN — OXYCODONE HYDROCHLORIDE 15 MG: 5 SOLUTION ORAL at 01:11

## 2024-11-10 RX ADMIN — HYDROMORPHONE HYDROCHLORIDE 1 MG: 1 INJECTION, SOLUTION INTRAMUSCULAR; INTRAVENOUS; SUBCUTANEOUS at 20:19

## 2024-11-10 RX ADMIN — IPRATROPIUM BROMIDE AND ALBUTEROL SULFATE 1 DOSE: 2.5; .5 SOLUTION RESPIRATORY (INHALATION) at 16:06

## 2024-11-10 RX ADMIN — CHLORHEXIDINE GLUCONATE, 0.12% ORAL RINSE 15 ML: 1.2 SOLUTION DENTAL at 08:38

## 2024-11-10 RX ADMIN — GUAIFENESIN 400 MG: 400 TABLET ORAL at 20:18

## 2024-11-10 RX ADMIN — CYCLOBENZAPRINE 10 MG: 10 TABLET, FILM COATED ORAL at 15:16

## 2024-11-10 RX ADMIN — BACITRACIN ZINC: 500 OINTMENT TOPICAL at 08:06

## 2024-11-10 RX ADMIN — GABAPENTIN 300 MG: 300 CAPSULE ORAL at 15:15

## 2024-11-10 RX ADMIN — PANTOPRAZOLE SODIUM 40 MG: 40 TABLET, DELAYED RELEASE ORAL at 08:37

## 2024-11-10 RX ADMIN — GUAIFENESIN 400 MG: 400 TABLET ORAL at 08:38

## 2024-11-10 RX ADMIN — CHLORHEXIDINE GLUCONATE, 0.12% ORAL RINSE 15 ML: 1.2 SOLUTION DENTAL at 20:17

## 2024-11-10 RX ADMIN — HYDROMORPHONE HYDROCHLORIDE 1 MG: 1 INJECTION, SOLUTION INTRAMUSCULAR; INTRAVENOUS; SUBCUTANEOUS at 08:30

## 2024-11-10 RX ADMIN — GUAIFENESIN 400 MG: 400 TABLET ORAL at 15:16

## 2024-11-10 RX ADMIN — LORAZEPAM 1 MG: 2 INJECTION INTRAMUSCULAR; INTRAVENOUS at 19:02

## 2024-11-10 RX ADMIN — ACETAMINOPHEN 650 MG: 650 SOLUTION ORAL at 01:11

## 2024-11-10 RX ADMIN — ACETAMINOPHEN 650 MG: 650 SOLUTION ORAL at 15:15

## 2024-11-10 RX ADMIN — MEROPENEM 1000 MG: 1 INJECTION INTRAVENOUS at 20:22

## 2024-11-10 RX ADMIN — HYDROMORPHONE HYDROCHLORIDE 1 MG: 1 INJECTION, SOLUTION INTRAMUSCULAR; INTRAVENOUS; SUBCUTANEOUS at 12:30

## 2024-11-10 RX ADMIN — INSULIN GLARGINE 30 UNITS: 100 INJECTION, SOLUTION SUBCUTANEOUS at 09:00

## 2024-11-10 RX ADMIN — IPRATROPIUM BROMIDE AND ALBUTEROL SULFATE 1 DOSE: 2.5; .5 SOLUTION RESPIRATORY (INHALATION) at 11:44

## 2024-11-10 RX ADMIN — OXYCODONE HYDROCHLORIDE 15 MG: 5 SOLUTION ORAL at 07:40

## 2024-11-10 RX ADMIN — GABAPENTIN 300 MG: 300 CAPSULE ORAL at 20:31

## 2024-11-10 RX ADMIN — POTASSIUM BICARBONATE 40 MEQ: 782 TABLET, EFFERVESCENT ORAL at 11:03

## 2024-11-10 RX ADMIN — Medication 500 MG: at 15:15

## 2024-11-10 ASSESSMENT — PULMONARY FUNCTION TESTS
PIF_VALUE: 17
PIF_VALUE: 18
PIF_VALUE: 16
PIF_VALUE: 29
PIF_VALUE: 18
PIF_VALUE: 19
PIF_VALUE: 17
PIF_VALUE: 28
PIF_VALUE: 17
PIF_VALUE: 17
PIF_VALUE: 22
PIF_VALUE: 17
PIF_VALUE: 17
PIF_VALUE: 25
PIF_VALUE: 22
PIF_VALUE: 18
PIF_VALUE: 31
PIF_VALUE: 18
PIF_VALUE: 18
PIF_VALUE: 16
PIF_VALUE: 28
PIF_VALUE: 40
PIF_VALUE: 20
PIF_VALUE: 16
PIF_VALUE: 15
PIF_VALUE: 17
PIF_VALUE: 24
PIF_VALUE: 17
PIF_VALUE: 15
PIF_VALUE: 20
PIF_VALUE: 17
PIF_VALUE: 17
PIF_VALUE: 19

## 2024-11-10 ASSESSMENT — PAIN SCALES - GENERAL
PAINLEVEL_OUTOF10: 3
PAINLEVEL_OUTOF10: 7
PAINLEVEL_OUTOF10: 2
PAINLEVEL_OUTOF10: 0
PAINLEVEL_OUTOF10: 5
PAINLEVEL_OUTOF10: 1
PAINLEVEL_OUTOF10: 0
PAINLEVEL_OUTOF10: 5
PAINLEVEL_OUTOF10: 3
PAINLEVEL_OUTOF10: 0
PAINLEVEL_OUTOF10: 5
PAINLEVEL_OUTOF10: 0
PAINLEVEL_OUTOF10: 7
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
## 2024-11-10 NOTE — PROGRESS NOTES
DAILY VENTILATOR WEANING ASSESSMENT PERFORMED    P/FIO2 Ratio =    194      (<100= do not Wean)                  Cs =    12                      (<32= Instability)  Plat. Pressure = 27  MV = 12    RSBI =    Instabilities:       Cardiovascular =       CNS =       Respiratory = r1 cs low       Metabolic =    Parameters    no    Wean per protocol  no    Ask Physician for a weaning plan yes    Additional Comments:     Performed by Patricia Simerlink, RCP RRT      Reference Table:    Cardiovascular     CNS      1. Mean BP less than or equal to 75   1. Neuromuscular blockade  2. Heart Rate greater than 130   2. RASS of -3, -4, -5  3. Myocardial Ischemia    3. RASS of +3, +4  4. Mechanical Assist Device    4. ICP greater than 15 or             Intracranial Hypertension         Respiratory      Metabolic  1. PEEP equal to or greater than 10cm/H20  1.Temp. (8hrs) less than 95 or > 103  2. Respiratory Rate greater than 35   2. WBC < 5000 or > 15359  3. Minute Volume greater than 15L  4. pH less than 7.30  5. Deteriorating chest X-ray

## 2024-11-10 NOTE — PROGRESS NOTES
[facility]  MD Laron Muller MD Ali Hassan, MD Lisa Kniska, CNP   Lo Elmore, BLAIRE Davidson, CNP  Progress note   Patient's Name: Gelnroy Simmons II  3:33 PM  11/10/2024        10/26 : seen in his room intubated mechanically ventilated fio2 90 peep 10 massively hyperovlemic , UO ok . No pressors .     10/27 seen in his room remain critically ill , mechanically ventilated no pressors .   We did try SLED yesterday and we could not achieve a good ultrafiltration due to lower blood pressure ,we did start him on a Bumex drip today and has been having excellent urine output in the range of 2  an hour per ICU nursing his azotemia is actually slightly better .  His oxygen requirement are higher and better ICU the plan is to do a CT scan    10/28: Seen in the SICU. Critically ill. Mechanically ventilated, FiO2 70 %. Receiving 1 unit PRBCs. He does open his eyes to voice. Remains hypervolemic. Urine output is excellent on Bumex drip. Not on any pressors.     10/29: Seen in the SICU. Remains critically ill. Mechanically ventilated via ETT. FiO2 90 %, PEEP 10. He does open his eyes to voice. Excellent urine output on Bumex drip, over 9 liters yesterday. Hypervolemia is improving. Blood pressure is stable.     10/30:  Remains critically ill.  Mechanically ventilated-->FiO2- 80% peep-10.  Continues on Bumex, Fentanyl, Levo and LR.  Bilateral chest tubes.  Going to OR with ortho.     10/31: Seen in the ICU. Critically ill. Mechanically ventilated. FiO2 75 %. He does open is eyes to voice.  Excellent urine output with Bumex drip. Chest tubes removed today.     11/1: Seen in the SICU. Mechanically ventilated and sedated. FiO2 is 70 %. He does open his eyes to voice. Rate of Bumex drip decreased today. Urine output remains excellent. Hypervolemia improving. Tolerating his tube feedings without issues. Brother is present at the bedside.     11/2: Seen in the ICU.  Remains

## 2024-11-10 NOTE — PLAN OF CARE
Problem: Respiratory - Adult  Goal: Achieves optimal ventilation and oxygenation  Outcome: Progressing     Problem: Neurosensory - Adult  Goal: Achieves maximal functionality and self care  11/9/2024 2134 by Jada Ceja, RN  Outcome: Not Progressing     Problem: Anxiety  Goal: Will report anxiety at manageable levels  Description: INTERVENTIONS:  1. Administer medication as ordered  2. Teach and rehearse alternative coping skills  3. Provide emotional support with 1:1 interaction with staff  11/9/2024 2134 by Jada Ceja, RN  Outcome: Not Progressing

## 2024-11-11 LAB
AADO2: 173.9 MMHG
ALBUMIN SERPL-MCNC: 2.9 G/DL (ref 3.5–5.2)
ALP SERPL-CCNC: 249 U/L (ref 40–129)
ALT SERPL-CCNC: 69 U/L (ref 0–40)
ANION GAP SERPL CALCULATED.3IONS-SCNC: 11 MMOL/L (ref 7–16)
AST SERPL-CCNC: 60 U/L (ref 0–39)
B.E.: 1 MMOL/L (ref -3–3)
BASOPHILS # BLD: 0.04 K/UL (ref 0–0.2)
BASOPHILS NFR BLD: 0 % (ref 0–2)
BILIRUB SERPL-MCNC: 1.4 MG/DL (ref 0–1.2)
BUN SERPL-MCNC: 22 MG/DL (ref 6–20)
CA-I BLD-SCNC: 1.11 MMOL/L (ref 1.15–1.33)
CALCIUM SERPL-MCNC: 7.8 MG/DL (ref 8.6–10.2)
CHLORIDE SERPL-SCNC: 110 MMOL/L (ref 98–107)
CO2 SERPL-SCNC: 25 MMOL/L (ref 22–29)
COHB: 1.5 % (ref 0–1.5)
CREAT SERPL-MCNC: 0.8 MG/DL (ref 0.7–1.2)
CRITICAL: ABNORMAL
DATE ANALYZED: ABNORMAL
DATE OF COLLECTION: ABNORMAL
EOSINOPHIL # BLD: 0.24 K/UL (ref 0.05–0.5)
EOSINOPHILS RELATIVE PERCENT: 1 % (ref 0–6)
ERYTHROCYTE [DISTWIDTH] IN BLOOD BY AUTOMATED COUNT: 20 % (ref 11.5–15)
FIO2: 45 %
GFR, ESTIMATED: >90 ML/MIN/1.73M2
GLUCOSE BLD-MCNC: 103 MG/DL (ref 74–99)
GLUCOSE BLD-MCNC: 112 MG/DL (ref 74–99)
GLUCOSE BLD-MCNC: 116 MG/DL (ref 74–99)
GLUCOSE BLD-MCNC: 119 MG/DL (ref 74–99)
GLUCOSE BLD-MCNC: 129 MG/DL (ref 74–99)
GLUCOSE BLD-MCNC: 139 MG/DL (ref 74–99)
GLUCOSE BLD-MCNC: 85 MG/DL (ref 74–99)
GLUCOSE BLD-MCNC: 93 MG/DL (ref 74–99)
GLUCOSE SERPL-MCNC: 136 MG/DL (ref 74–99)
HCO3: 24.5 MMOL/L (ref 22–26)
HCT VFR BLD AUTO: 27.5 % (ref 37–54)
HGB BLD-MCNC: 8.1 G/DL (ref 12.5–16.5)
HHB: 1.8 % (ref 0–5)
IMM GRANULOCYTES # BLD AUTO: 0.16 K/UL (ref 0–0.58)
IMM GRANULOCYTES NFR BLD: 1 % (ref 0–5)
LAB: ABNORMAL
LYMPHOCYTES NFR BLD: 1.15 K/UL (ref 1.5–4)
LYMPHOCYTES RELATIVE PERCENT: 6 % (ref 20–42)
Lab: 428
MAGNESIUM SERPL-MCNC: 2 MG/DL (ref 1.6–2.6)
MCH RBC QN AUTO: 29.7 PG (ref 26–35)
MCHC RBC AUTO-ENTMCNC: 29.5 G/DL (ref 32–34.5)
MCV RBC AUTO: 100.7 FL (ref 80–99.9)
METHB: 0.4 % (ref 0–1.5)
MODE: AC
MONOCYTES NFR BLD: 1.39 K/UL (ref 0.1–0.95)
MONOCYTES NFR BLD: 8 % (ref 2–12)
NEUTROPHILS NFR BLD: 83 % (ref 43–80)
NEUTS SEG NFR BLD: 14.85 K/UL (ref 1.8–7.3)
O2 SATURATION: 98.2 % (ref 92–98.5)
O2HB: 96.3 % (ref 94–97)
OPERATOR ID: 2962
PATIENT TEMP: 37 C
PCO2: 34.5 MMHG (ref 35–45)
PEEP/CPAP: 10 CMH2O
PFO2: 2.39 MMHG/%
PH BLOOD GAS: 7.47 (ref 7.35–7.45)
PHOSPHATE SERPL-MCNC: 2.2 MG/DL (ref 2.5–4.5)
PLATELET # BLD AUTO: 736 K/UL (ref 130–450)
PMV BLD AUTO: 10.7 FL (ref 7–12)
PO2: 107.7 MMHG (ref 75–100)
POTASSIUM SERPL-SCNC: 4.1 MMOL/L (ref 3.5–5)
PROT SERPL-MCNC: 5.7 G/DL (ref 6.4–8.3)
RBC # BLD AUTO: 2.73 M/UL (ref 3.8–5.8)
RI(T): 1.61
RR MECHANICAL: 20 B/MIN
SODIUM SERPL-SCNC: 146 MMOL/L (ref 132–146)
SOURCE, BLOOD GAS: ABNORMAL
THB: 9.2 G/DL (ref 11.5–16.5)
TIME ANALYZED: 442
VT MECHANICAL: 500 ML
WBC OTHER # BLD: 17.8 K/UL (ref 4.5–11.5)

## 2024-11-11 PROCEDURE — 85025 COMPLETE CBC W/AUTO DIFF WBC: CPT

## 2024-11-11 PROCEDURE — 2580000003 HC RX 258: Performed by: SURGERY

## 2024-11-11 PROCEDURE — 84100 ASSAY OF PHOSPHORUS: CPT

## 2024-11-11 PROCEDURE — 2580000003 HC RX 258

## 2024-11-11 PROCEDURE — 82330 ASSAY OF CALCIUM: CPT

## 2024-11-11 PROCEDURE — 80053 COMPREHEN METABOLIC PANEL: CPT

## 2024-11-11 PROCEDURE — 99291 CRITICAL CARE FIRST HOUR: CPT | Performed by: SURGERY

## 2024-11-11 PROCEDURE — 6370000000 HC RX 637 (ALT 250 FOR IP)

## 2024-11-11 PROCEDURE — 83735 ASSAY OF MAGNESIUM: CPT

## 2024-11-11 PROCEDURE — 6360000002 HC RX W HCPCS

## 2024-11-11 PROCEDURE — 80048 BASIC METABOLIC PNL TOTAL CA: CPT

## 2024-11-11 PROCEDURE — 37799 UNLISTED PX VASCULAR SURGERY: CPT

## 2024-11-11 PROCEDURE — 94003 VENT MGMT INPAT SUBQ DAY: CPT

## 2024-11-11 PROCEDURE — 6360000002 HC RX W HCPCS: Performed by: SURGERY

## 2024-11-11 PROCEDURE — 82805 BLOOD GASES W/O2 SATURATION: CPT

## 2024-11-11 PROCEDURE — 94640 AIRWAY INHALATION TREATMENT: CPT

## 2024-11-11 PROCEDURE — 82962 GLUCOSE BLOOD TEST: CPT

## 2024-11-11 PROCEDURE — 2500000003 HC RX 250 WO HCPCS

## 2024-11-11 PROCEDURE — 2000000000 HC ICU R&B

## 2024-11-11 RX ORDER — FUROSEMIDE 10 MG/ML
20 INJECTION INTRAMUSCULAR; INTRAVENOUS 2 TIMES DAILY
Status: COMPLETED | OUTPATIENT
Start: 2024-11-11 | End: 2024-11-12

## 2024-11-11 RX ORDER — INSULIN GLARGINE 100 [IU]/ML
15 INJECTION, SOLUTION SUBCUTANEOUS 2 TIMES DAILY
Status: DISCONTINUED | OUTPATIENT
Start: 2024-11-11 | End: 2024-11-12

## 2024-11-11 RX ORDER — OXYCODONE HCL 5 MG/5 ML
15 SOLUTION, ORAL ORAL EVERY 4 HOURS
Status: DISCONTINUED | OUTPATIENT
Start: 2024-11-11 | End: 2024-11-11

## 2024-11-11 RX ORDER — OXYCODONE HCL 5 MG/5 ML
15 SOLUTION, ORAL ORAL EVERY 4 HOURS
Status: DISCONTINUED | OUTPATIENT
Start: 2024-11-11 | End: 2024-11-14 | Stop reason: HOSPADM

## 2024-11-11 RX ORDER — LOPERAMIDE HYDROCHLORIDE 2 MG/1
2 CAPSULE ORAL 4 TIMES DAILY
Status: DISCONTINUED | OUTPATIENT
Start: 2024-11-11 | End: 2024-11-14 | Stop reason: HOSPADM

## 2024-11-11 RX ORDER — MORPHINE SULFATE 15 MG/1
15 TABLET, FILM COATED, EXTENDED RELEASE ORAL EVERY 12 HOURS SCHEDULED
Status: DISCONTINUED | OUTPATIENT
Start: 2024-11-11 | End: 2024-11-11

## 2024-11-11 RX ORDER — OXYCODONE HCL 5 MG/5 ML
15 SOLUTION, ORAL ORAL EVERY 4 HOURS PRN
Status: DISCONTINUED | OUTPATIENT
Start: 2024-11-11 | End: 2024-11-11

## 2024-11-11 RX ORDER — LANSOPRAZOLE 30 MG/1
30 TABLET, ORALLY DISINTEGRATING, DELAYED RELEASE ORAL
Status: DISCONTINUED | OUTPATIENT
Start: 2024-11-12 | End: 2024-11-14 | Stop reason: HOSPADM

## 2024-11-11 RX ORDER — OXYCODONE HYDROCHLORIDE 5 MG/1
5 TABLET ORAL EVERY 6 HOURS PRN
Qty: 28 TABLET | Refills: 0 | Status: SHIPPED | OUTPATIENT
Start: 2024-11-11 | End: 2024-11-14 | Stop reason: HOSPADM

## 2024-11-11 RX ADMIN — OXYCODONE HYDROCHLORIDE 15 MG: 5 SOLUTION ORAL at 00:36

## 2024-11-11 RX ADMIN — CYCLOBENZAPRINE 10 MG: 10 TABLET, FILM COATED ORAL at 20:30

## 2024-11-11 RX ADMIN — CHLORHEXIDINE GLUCONATE, 0.12% ORAL RINSE 15 ML: 1.2 SOLUTION DENTAL at 20:31

## 2024-11-11 RX ADMIN — ACETAMINOPHEN 650 MG: 650 SOLUTION ORAL at 20:30

## 2024-11-11 RX ADMIN — LOPERAMIDE HYDROCHLORIDE 2 MG: 2 CAPSULE ORAL at 17:27

## 2024-11-11 RX ADMIN — Medication 500 MG: at 17:27

## 2024-11-11 RX ADMIN — HYDROMORPHONE HYDROCHLORIDE 0.5 MG: 1 INJECTION, SOLUTION INTRAMUSCULAR; INTRAVENOUS; SUBCUTANEOUS at 13:16

## 2024-11-11 RX ADMIN — OXYCODONE HYDROCHLORIDE 15 MG: 5 SOLUTION ORAL at 14:17

## 2024-11-11 RX ADMIN — GABAPENTIN 300 MG: 300 CAPSULE ORAL at 14:17

## 2024-11-11 RX ADMIN — HYDROMORPHONE HYDROCHLORIDE 0.5 MG: 1 INJECTION, SOLUTION INTRAMUSCULAR; INTRAVENOUS; SUBCUTANEOUS at 23:51

## 2024-11-11 RX ADMIN — ENOXAPARIN SODIUM 60 MG: 100 INJECTION SUBCUTANEOUS at 20:30

## 2024-11-11 RX ADMIN — CYCLOBENZAPRINE 10 MG: 10 TABLET, FILM COATED ORAL at 08:48

## 2024-11-11 RX ADMIN — SODIUM PHOSPHATE, MONOBASIC, MONOHYDRATE AND SODIUM PHOSPHATE, DIBASIC, ANHYDROUS 20 MMOL: 142; 276 INJECTION, SOLUTION INTRAVENOUS at 10:47

## 2024-11-11 RX ADMIN — IPRATROPIUM BROMIDE AND ALBUTEROL SULFATE 1 DOSE: 2.5; .5 SOLUTION RESPIRATORY (INHALATION) at 20:04

## 2024-11-11 RX ADMIN — HYDROMORPHONE HYDROCHLORIDE 1 MG: 1 INJECTION, SOLUTION INTRAMUSCULAR; INTRAVENOUS; SUBCUTANEOUS at 07:39

## 2024-11-11 RX ADMIN — Medication 500 MG: at 20:31

## 2024-11-11 RX ADMIN — GABAPENTIN 300 MG: 300 CAPSULE ORAL at 08:49

## 2024-11-11 RX ADMIN — IPRATROPIUM BROMIDE AND ALBUTEROL SULFATE 1 DOSE: 2.5; .5 SOLUTION RESPIRATORY (INHALATION) at 15:52

## 2024-11-11 RX ADMIN — GUAIFENESIN 400 MG: 400 TABLET ORAL at 08:48

## 2024-11-11 RX ADMIN — MEROPENEM 1000 MG: 1 INJECTION INTRAVENOUS at 12:30

## 2024-11-11 RX ADMIN — INSULIN GLARGINE 30 UNITS: 100 INJECTION, SOLUTION SUBCUTANEOUS at 08:46

## 2024-11-11 RX ADMIN — OXYCODONE HYDROCHLORIDE 15 MG: 5 SOLUTION ORAL at 05:15

## 2024-11-11 RX ADMIN — GUAIFENESIN 400 MG: 400 TABLET ORAL at 14:17

## 2024-11-11 RX ADMIN — ACETAMINOPHEN 650 MG: 650 SOLUTION ORAL at 08:46

## 2024-11-11 RX ADMIN — FUROSEMIDE 20 MG: 10 INJECTION, SOLUTION INTRAMUSCULAR; INTRAVENOUS at 17:27

## 2024-11-11 RX ADMIN — Medication 500 MG: at 08:46

## 2024-11-11 RX ADMIN — ACETAMINOPHEN 650 MG: 650 SOLUTION ORAL at 14:16

## 2024-11-11 RX ADMIN — GABAPENTIN 300 MG: 300 CAPSULE ORAL at 20:30

## 2024-11-11 RX ADMIN — GUAIFENESIN 400 MG: 400 TABLET ORAL at 20:30

## 2024-11-11 RX ADMIN — MEROPENEM 1000 MG: 1 INJECTION INTRAVENOUS at 20:38

## 2024-11-11 RX ADMIN — CALCIUM GLUCONATE 2000 MG: 98 INJECTION, SOLUTION INTRAVENOUS at 09:00

## 2024-11-11 RX ADMIN — Medication 500 MG: at 12:27

## 2024-11-11 RX ADMIN — ENOXAPARIN SODIUM 60 MG: 100 INJECTION SUBCUTANEOUS at 08:46

## 2024-11-11 RX ADMIN — LOPERAMIDE HYDROCHLORIDE 2 MG: 2 CAPSULE ORAL at 20:30

## 2024-11-11 RX ADMIN — PANTOPRAZOLE SODIUM 40 MG: 40 TABLET, DELAYED RELEASE ORAL at 08:48

## 2024-11-11 RX ADMIN — MEROPENEM 1000 MG: 1 INJECTION INTRAVENOUS at 04:53

## 2024-11-11 RX ADMIN — OXYCODONE HYDROCHLORIDE 15 MG: 5 SOLUTION ORAL at 17:27

## 2024-11-11 RX ADMIN — IPRATROPIUM BROMIDE AND ALBUTEROL SULFATE 1 DOSE: 2.5; .5 SOLUTION RESPIRATORY (INHALATION) at 12:15

## 2024-11-11 RX ADMIN — OXYCODONE HYDROCHLORIDE 15 MG: 5 SOLUTION ORAL at 21:40

## 2024-11-11 RX ADMIN — OXYCODONE HYDROCHLORIDE 15 MG: 5 SOLUTION ORAL at 10:52

## 2024-11-11 RX ADMIN — HYDROMORPHONE HYDROCHLORIDE 0.5 MG: 1 INJECTION, SOLUTION INTRAMUSCULAR; INTRAVENOUS; SUBCUTANEOUS at 17:28

## 2024-11-11 RX ADMIN — ACETAMINOPHEN 650 MG: 650 SOLUTION ORAL at 04:39

## 2024-11-11 RX ADMIN — LOPERAMIDE HYDROCHLORIDE 2 MG: 2 CAPSULE ORAL at 12:27

## 2024-11-11 RX ADMIN — IPRATROPIUM BROMIDE AND ALBUTEROL SULFATE 1 DOSE: 2.5; .5 SOLUTION RESPIRATORY (INHALATION) at 08:46

## 2024-11-11 RX ADMIN — BACITRACIN ZINC: 500 OINTMENT TOPICAL at 08:49

## 2024-11-11 RX ADMIN — ACETYLCYSTEINE 600 MG: 200 INHALANT RESPIRATORY (INHALATION) at 08:46

## 2024-11-11 RX ADMIN — ACETYLCYSTEINE 600 MG: 200 INHALANT RESPIRATORY (INHALATION) at 20:04

## 2024-11-11 RX ADMIN — CHLORHEXIDINE GLUCONATE, 0.12% ORAL RINSE 15 ML: 1.2 SOLUTION DENTAL at 08:46

## 2024-11-11 RX ADMIN — CYCLOBENZAPRINE 10 MG: 10 TABLET, FILM COATED ORAL at 14:17

## 2024-11-11 RX ADMIN — PSYLLIUM HUSK 2 PACKET: 3.4 POWDER ORAL at 10:41

## 2024-11-11 ASSESSMENT — PULMONARY FUNCTION TESTS
PIF_VALUE: 41
PIF_VALUE: 19
PIF_VALUE: 16
PIF_VALUE: 22
PIF_VALUE: 25
PIF_VALUE: 24
PIF_VALUE: 32
PIF_VALUE: 40
PIF_VALUE: 28
PIF_VALUE: 27
PIF_VALUE: 21
PIF_VALUE: 29
PIF_VALUE: 13
PIF_VALUE: 20
PIF_VALUE: 17
PIF_VALUE: 21
PIF_VALUE: 33
PIF_VALUE: 17
PIF_VALUE: 18
PIF_VALUE: 19
PIF_VALUE: 17
PIF_VALUE: 17
PIF_VALUE: 36
PIF_VALUE: 35
PIF_VALUE: 41
PIF_VALUE: 28
PIF_VALUE: 26
PIF_VALUE: 21

## 2024-11-11 ASSESSMENT — PAIN SCALES - GENERAL
PAINLEVEL_OUTOF10: 0
PAINLEVEL_OUTOF10: 0
PAINLEVEL_OUTOF10: 7
PAINLEVEL_OUTOF10: 0
PAINLEVEL_OUTOF10: 7

## 2024-11-11 ASSESSMENT — PAIN DESCRIPTION - DESCRIPTORS: DESCRIPTORS: THROBBING

## 2024-11-11 ASSESSMENT — PAIN DESCRIPTION - LOCATION: LOCATION: OTHER (COMMENT)
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
## 2024-11-11 NOTE — PROGRESS NOTES
[facility]  MD Laron Muller MD Ali Hassan, MD Lisa Kniska, CNP   Lo Elmore, BLAIRE Davidson, CNP  Progress note   Patient's Name: Glenroy Simmons II  2:57 PM  11/11/2024        10/26 : seen in his room intubated mechanically ventilated fio2 90 peep 10 massively hyperovlemic , UO ok . No pressors .     10/27 seen in his room remain critically ill , mechanically ventilated no pressors .   We did try SLED yesterday and we could not achieve a good ultrafiltration due to lower blood pressure ,we did start him on a Bumex drip today and has been having excellent urine output in the range of 2  an hour per ICU nursing his azotemia is actually slightly better .  His oxygen requirement are higher and better ICU the plan is to do a CT scan    10/28: Seen in the SICU. Critically ill. Mechanically ventilated, FiO2 70 %. Receiving 1 unit PRBCs. He does open his eyes to voice. Remains hypervolemic. Urine output is excellent on Bumex drip. Not on any pressors.     10/29: Seen in the SICU. Remains critically ill. Mechanically ventilated via ETT. FiO2 90 %, PEEP 10. He does open his eyes to voice. Excellent urine output on Bumex drip, over 9 liters yesterday. Hypervolemia is improving. Blood pressure is stable.     10/30:  Remains critically ill.  Mechanically ventilated-->FiO2- 80% peep-10.  Continues on Bumex, Fentanyl, Levo and LR.  Bilateral chest tubes.  Going to OR with ortho.     10/31: Seen in the ICU. Critically ill. Mechanically ventilated. FiO2 75 %. He does open is eyes to voice.  Excellent urine output with Bumex drip. Chest tubes removed today.     11/1: Seen in the SICU. Mechanically ventilated and sedated. FiO2 is 70 %. He does open his eyes to voice. Rate of Bumex drip decreased today. Urine output remains excellent. Hypervolemia improving. Tolerating his tube feedings without issues. Brother is present at the bedside.     11/2: Seen in the ICU.  Remains

## 2024-11-11 NOTE — PROGRESS NOTES
Patient unavailable to communicate.  Reached out to family, checking in on their status.  They are practicing good personal self-care, in light of the hospitalization.    If you need additional support, you may reach out to us at [facility], x [phone].     Brandyn Red; DAR Dunbar
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
## 2024-11-11 NOTE — PROGRESS NOTES
Patient seen and examined at bedside this morning.  Pain has been reasonably controlled to the lower extremities.  He continues to have decreased sensation to both lower extremities.  He also has had some delirium and is currently in restraints.    Examination of bilateral lower extremities:    Splint in place to left lower extremity, clean, dry and intact  Sensation is minimal to bilateral lower extremities  No active dorsiflexion demonstrated to bilateral lower extremities at the ankle or digits of the foot  Feet cool to touch bilaterally, dopplerable pulses    Patient is status post open reduction term fixation of left proximal tibia 11/3/2024.  Patient status post open reduction internal fixation of right acetabulum on 10/30/2024, current conservative treatment of left periprosthetic acetabular fracture and left greater trochanter fracture.  He also has bilateral sciatic nerve injuries.    Patient will be seen in the orthopedic clinic at follow-up for planning regarding definitive treatment for his left hip.  We  do not anticipate any additional surgical intervention during his hospital stay.  Would recommend Lovenox for DVT prophylaxis at discharge if agreeable with the SICU team.  We will continue to follow him peripherally.
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
## 2024-11-12 LAB
AADO2: 164.9 MMHG
ALBUMIN SERPL-MCNC: 2.8 G/DL (ref 3.5–5.2)
ALP SERPL-CCNC: 233 U/L (ref 40–129)
ALT SERPL-CCNC: 61 U/L (ref 0–40)
ANION GAP SERPL CALCULATED.3IONS-SCNC: 11 MMOL/L (ref 7–16)
ANION GAP SERPL CALCULATED.3IONS-SCNC: 7 MMOL/L (ref 7–16)
AST SERPL-CCNC: 50 U/L (ref 0–39)
B.E.: 2.7 MMOL/L (ref -3–3)
BASOPHILS # BLD: 0.03 K/UL (ref 0–0.2)
BASOPHILS NFR BLD: 0 % (ref 0–2)
BILIRUB SERPL-MCNC: 1.1 MG/DL (ref 0–1.2)
BUN SERPL-MCNC: 22 MG/DL (ref 6–20)
BUN SERPL-MCNC: 23 MG/DL (ref 6–20)
CA-I BLD-SCNC: 1.11 MMOL/L (ref 1.15–1.33)
CALCIUM SERPL-MCNC: 7.6 MG/DL (ref 8.6–10.2)
CALCIUM SERPL-MCNC: 7.9 MG/DL (ref 8.6–10.2)
CHLORIDE SERPL-SCNC: 108 MMOL/L (ref 98–107)
CHLORIDE SERPL-SCNC: 110 MMOL/L (ref 98–107)
CO2 SERPL-SCNC: 27 MMOL/L (ref 22–29)
CO2 SERPL-SCNC: 27 MMOL/L (ref 22–29)
COHB: 0.6 % (ref 0–1.5)
CREAT SERPL-MCNC: 0.8 MG/DL (ref 0.7–1.2)
CREAT SERPL-MCNC: 0.8 MG/DL (ref 0.7–1.2)
CRITICAL: ABNORMAL
DATE ANALYZED: ABNORMAL
DATE OF COLLECTION: ABNORMAL
EOSINOPHIL # BLD: 0.34 K/UL (ref 0.05–0.5)
EOSINOPHILS RELATIVE PERCENT: 2 % (ref 0–6)
ERYTHROCYTE [DISTWIDTH] IN BLOOD BY AUTOMATED COUNT: 20.1 % (ref 11.5–15)
FIO2: 45 %
GFR, ESTIMATED: >90 ML/MIN/1.73M2
GFR, ESTIMATED: >90 ML/MIN/1.73M2
GLUCOSE BLD-MCNC: 108 MG/DL (ref 74–99)
GLUCOSE BLD-MCNC: 109 MG/DL (ref 74–99)
GLUCOSE BLD-MCNC: 114 MG/DL (ref 74–99)
GLUCOSE BLD-MCNC: 148 MG/DL (ref 74–99)
GLUCOSE BLD-MCNC: 151 MG/DL (ref 74–99)
GLUCOSE SERPL-MCNC: 109 MG/DL (ref 74–99)
GLUCOSE SERPL-MCNC: 113 MG/DL (ref 74–99)
HCO3: 26.2 MMOL/L (ref 22–26)
HCT VFR BLD AUTO: 25.7 % (ref 37–54)
HGB BLD-MCNC: 7.6 G/DL (ref 12.5–16.5)
HHB: 1.7 % (ref 0–5)
IMM GRANULOCYTES # BLD AUTO: 0.11 K/UL (ref 0–0.58)
IMM GRANULOCYTES NFR BLD: 1 % (ref 0–5)
LAB: ABNORMAL
LYMPHOCYTES NFR BLD: 1.29 K/UL (ref 1.5–4)
LYMPHOCYTES RELATIVE PERCENT: 9 % (ref 20–42)
Lab: 423
MAGNESIUM SERPL-MCNC: 1.9 MG/DL (ref 1.6–2.6)
MCH RBC QN AUTO: 29.5 PG (ref 26–35)
MCHC RBC AUTO-ENTMCNC: 29.6 G/DL (ref 32–34.5)
MCV RBC AUTO: 99.6 FL (ref 80–99.9)
METHB: 2.3 % (ref 0–1.5)
MICROORGANISM SPEC CULT: NORMAL
MICROORGANISM SPEC CULT: NORMAL
MODE: AC
MONOCYTES NFR BLD: 1.24 K/UL (ref 0.1–0.95)
MONOCYTES NFR BLD: 8 % (ref 2–12)
NEUTROPHILS NFR BLD: 80 % (ref 43–80)
NEUTS SEG NFR BLD: 11.72 K/UL (ref 1.8–7.3)
O2 SATURATION: 98.2 % (ref 92–98.5)
O2HB: 95.4 % (ref 94–97)
OPERATOR ID: 421
PATIENT TEMP: 37 C
PCO2: 35.7 MMHG (ref 35–45)
PEEP/CPAP: 10 CMH2O
PFO2: 2.56 MMHG/%
PH BLOOD GAS: 7.48 (ref 7.35–7.45)
PHOSPHATE SERPL-MCNC: 3 MG/DL (ref 2.5–4.5)
PLATELET # BLD AUTO: 724 K/UL (ref 130–450)
PMV BLD AUTO: 10.1 FL (ref 7–12)
PO2: 115.3 MMHG (ref 75–100)
POTASSIUM SERPL-SCNC: 3.7 MMOL/L (ref 3.5–5)
POTASSIUM SERPL-SCNC: 3.9 MMOL/L (ref 3.5–5)
PROT SERPL-MCNC: 5.4 G/DL (ref 6.4–8.3)
RBC # BLD AUTO: 2.58 M/UL (ref 3.8–5.8)
RBC # BLD: ABNORMAL 10*6/UL
RI(T): 1.43
RR MECHANICAL: 16 B/MIN
SERVICE CMNT-IMP: NORMAL
SERVICE CMNT-IMP: NORMAL
SODIUM SERPL-SCNC: 142 MMOL/L (ref 132–146)
SODIUM SERPL-SCNC: 148 MMOL/L (ref 132–146)
SOURCE, BLOOD GAS: ABNORMAL
SPECIMEN DESCRIPTION: NORMAL
SPECIMEN DESCRIPTION: NORMAL
THB: 8.9 G/DL (ref 11.5–16.5)
TIME ANALYZED: 431
VT MECHANICAL: 500 ML
WBC OTHER # BLD: 14.7 K/UL (ref 4.5–11.5)

## 2024-11-12 PROCEDURE — 83735 ASSAY OF MAGNESIUM: CPT

## 2024-11-12 PROCEDURE — 85025 COMPLETE CBC W/AUTO DIFF WBC: CPT

## 2024-11-12 PROCEDURE — 2000000000 HC ICU R&B

## 2024-11-12 PROCEDURE — 6360000002 HC RX W HCPCS: Performed by: SURGERY

## 2024-11-12 PROCEDURE — 6370000000 HC RX 637 (ALT 250 FOR IP)

## 2024-11-12 PROCEDURE — 82330 ASSAY OF CALCIUM: CPT

## 2024-11-12 PROCEDURE — 82962 GLUCOSE BLOOD TEST: CPT

## 2024-11-12 PROCEDURE — 82805 BLOOD GASES W/O2 SATURATION: CPT

## 2024-11-12 PROCEDURE — 94003 VENT MGMT INPAT SUBQ DAY: CPT

## 2024-11-12 PROCEDURE — 6360000002 HC RX W HCPCS

## 2024-11-12 PROCEDURE — 37799 UNLISTED PX VASCULAR SURGERY: CPT

## 2024-11-12 PROCEDURE — 94640 AIRWAY INHALATION TREATMENT: CPT

## 2024-11-12 PROCEDURE — 2580000003 HC RX 258: Performed by: SURGERY

## 2024-11-12 PROCEDURE — 80053 COMPREHEN METABOLIC PANEL: CPT

## 2024-11-12 PROCEDURE — 2580000003 HC RX 258

## 2024-11-12 PROCEDURE — 84100 ASSAY OF PHOSPHORUS: CPT

## 2024-11-12 PROCEDURE — 99291 CRITICAL CARE FIRST HOUR: CPT | Performed by: SURGERY

## 2024-11-12 RX ADMIN — OXYCODONE HYDROCHLORIDE 15 MG: 5 SOLUTION ORAL at 05:57

## 2024-11-12 RX ADMIN — HYDROMORPHONE HYDROCHLORIDE 0.5 MG: 1 INJECTION, SOLUTION INTRAMUSCULAR; INTRAVENOUS; SUBCUTANEOUS at 18:56

## 2024-11-12 RX ADMIN — GUAIFENESIN 400 MG: 400 TABLET ORAL at 08:49

## 2024-11-12 RX ADMIN — PSYLLIUM HUSK 2 PACKET: 3.4 POWDER ORAL at 10:41

## 2024-11-12 RX ADMIN — LOPERAMIDE HYDROCHLORIDE 2 MG: 2 CAPSULE ORAL at 08:48

## 2024-11-12 RX ADMIN — CYCLOBENZAPRINE 10 MG: 10 TABLET, FILM COATED ORAL at 20:54

## 2024-11-12 RX ADMIN — LOPERAMIDE HYDROCHLORIDE 2 MG: 2 CAPSULE ORAL at 14:28

## 2024-11-12 RX ADMIN — MEROPENEM 1000 MG: 1 INJECTION INTRAVENOUS at 12:39

## 2024-11-12 RX ADMIN — INSULIN LISPRO 4 UNITS: 100 INJECTION, SOLUTION INTRAVENOUS; SUBCUTANEOUS at 20:54

## 2024-11-12 RX ADMIN — GUAIFENESIN 400 MG: 400 TABLET ORAL at 14:28

## 2024-11-12 RX ADMIN — ACETAMINOPHEN 650 MG: 650 SOLUTION ORAL at 02:09

## 2024-11-12 RX ADMIN — ACETYLCYSTEINE 600 MG: 200 INHALANT RESPIRATORY (INHALATION) at 20:26

## 2024-11-12 RX ADMIN — OXYCODONE HYDROCHLORIDE 15 MG: 5 SOLUTION ORAL at 10:30

## 2024-11-12 RX ADMIN — Medication 500 MG: at 14:27

## 2024-11-12 RX ADMIN — ENOXAPARIN SODIUM 60 MG: 100 INJECTION SUBCUTANEOUS at 20:58

## 2024-11-12 RX ADMIN — OXYCODONE HYDROCHLORIDE 15 MG: 5 SOLUTION ORAL at 14:28

## 2024-11-12 RX ADMIN — MEROPENEM 1000 MG: 1 INJECTION INTRAVENOUS at 23:45

## 2024-11-12 RX ADMIN — INSULIN GLARGINE 15 UNITS: 100 INJECTION, SOLUTION SUBCUTANEOUS at 00:04

## 2024-11-12 RX ADMIN — OXYCODONE HYDROCHLORIDE 15 MG: 5 SOLUTION ORAL at 20:54

## 2024-11-12 RX ADMIN — OXYCODONE HYDROCHLORIDE 15 MG: 5 SOLUTION ORAL at 02:09

## 2024-11-12 RX ADMIN — IPRATROPIUM BROMIDE AND ALBUTEROL SULFATE 1 DOSE: 2.5; .5 SOLUTION RESPIRATORY (INHALATION) at 20:26

## 2024-11-12 RX ADMIN — FUROSEMIDE 20 MG: 10 INJECTION, SOLUTION INTRAMUSCULAR; INTRAVENOUS at 08:48

## 2024-11-12 RX ADMIN — CHLORHEXIDINE GLUCONATE, 0.12% ORAL RINSE 15 ML: 1.2 SOLUTION DENTAL at 08:47

## 2024-11-12 RX ADMIN — MEROPENEM 1000 MG: 1 INJECTION INTRAVENOUS at 05:31

## 2024-11-12 RX ADMIN — LOPERAMIDE HYDROCHLORIDE 2 MG: 2 CAPSULE ORAL at 20:54

## 2024-11-12 RX ADMIN — ENOXAPARIN SODIUM 60 MG: 100 INJECTION SUBCUTANEOUS at 08:46

## 2024-11-12 RX ADMIN — OXYCODONE HYDROCHLORIDE 15 MG: 5 SOLUTION ORAL at 17:45

## 2024-11-12 RX ADMIN — IPRATROPIUM BROMIDE AND ALBUTEROL SULFATE 1 DOSE: 2.5; .5 SOLUTION RESPIRATORY (INHALATION) at 08:17

## 2024-11-12 RX ADMIN — GABAPENTIN 300 MG: 300 CAPSULE ORAL at 14:28

## 2024-11-12 RX ADMIN — GUAIFENESIN 400 MG: 400 TABLET ORAL at 20:54

## 2024-11-12 RX ADMIN — CHLORHEXIDINE GLUCONATE, 0.12% ORAL RINSE 15 ML: 1.2 SOLUTION DENTAL at 20:54

## 2024-11-12 RX ADMIN — HYDROMORPHONE HYDROCHLORIDE 0.5 MG: 1 INJECTION, SOLUTION INTRAMUSCULAR; INTRAVENOUS; SUBCUTANEOUS at 23:47

## 2024-11-12 RX ADMIN — LOPERAMIDE HYDROCHLORIDE 2 MG: 2 CAPSULE ORAL at 16:00

## 2024-11-12 RX ADMIN — BACITRACIN ZINC: 500 OINTMENT TOPICAL at 08:46

## 2024-11-12 RX ADMIN — POTASSIUM BICARBONATE 20 MEQ: 782 TABLET, EFFERVESCENT ORAL at 15:43

## 2024-11-12 RX ADMIN — IPRATROPIUM BROMIDE AND ALBUTEROL SULFATE 1 DOSE: 2.5; .5 SOLUTION RESPIRATORY (INHALATION) at 16:39

## 2024-11-12 RX ADMIN — ACETAMINOPHEN 650 MG: 650 SOLUTION ORAL at 14:28

## 2024-11-12 RX ADMIN — CYCLOBENZAPRINE 10 MG: 10 TABLET, FILM COATED ORAL at 08:49

## 2024-11-12 RX ADMIN — GABAPENTIN 300 MG: 300 CAPSULE ORAL at 08:49

## 2024-11-12 RX ADMIN — Medication 500 MG: at 16:00

## 2024-11-12 RX ADMIN — ACETYLCYSTEINE 600 MG: 200 INHALANT RESPIRATORY (INHALATION) at 08:17

## 2024-11-12 RX ADMIN — CYCLOBENZAPRINE 10 MG: 10 TABLET, FILM COATED ORAL at 14:28

## 2024-11-12 RX ADMIN — GABAPENTIN 300 MG: 300 CAPSULE ORAL at 20:54

## 2024-11-12 RX ADMIN — Medication 500 MG: at 20:54

## 2024-11-12 RX ADMIN — ACETAMINOPHEN 650 MG: 650 SOLUTION ORAL at 20:53

## 2024-11-12 RX ADMIN — CALCIUM GLUCONATE 2000 MG: 98 INJECTION, SOLUTION INTRAVENOUS at 15:46

## 2024-11-12 RX ADMIN — LANSOPRAZOLE 30 MG: 30 TABLET, ORALLY DISINTEGRATING, DELAYED RELEASE ORAL at 05:57

## 2024-11-12 RX ADMIN — Medication 500 MG: at 08:47

## 2024-11-12 RX ADMIN — ACETAMINOPHEN 650 MG: 650 SOLUTION ORAL at 08:47

## 2024-11-12 RX ADMIN — IPRATROPIUM BROMIDE AND ALBUTEROL SULFATE 1 DOSE: 2.5; .5 SOLUTION RESPIRATORY (INHALATION) at 12:43

## 2024-11-12 ASSESSMENT — PAIN - FUNCTIONAL ASSESSMENT
PAIN_FUNCTIONAL_ASSESSMENT: ACTIVITIES ARE NOT PREVENTED
PAIN_FUNCTIONAL_ASSESSMENT: PREVENTS OR INTERFERES WITH ALL ACTIVE AND SOME PASSIVE ACTIVITIES
PAIN_FUNCTIONAL_ASSESSMENT: PREVENTS OR INTERFERES WITH ALL ACTIVE AND SOME PASSIVE ACTIVITIES

## 2024-11-12 ASSESSMENT — PAIN SCALES - GENERAL
PAINLEVEL_OUTOF10: 3
PAINLEVEL_OUTOF10: 1
PAINLEVEL_OUTOF10: 1
PAINLEVEL_OUTOF10: 4
PAINLEVEL_OUTOF10: 1
PAINLEVEL_OUTOF10: 7
PAINLEVEL_OUTOF10: 5
PAINLEVEL_OUTOF10: 4
PAINLEVEL_OUTOF10: 3
PAINLEVEL_OUTOF10: 0
PAINLEVEL_OUTOF10: 3
PAINLEVEL_OUTOF10: 5

## 2024-11-12 ASSESSMENT — PAIN DESCRIPTION - FREQUENCY
FREQUENCY: CONTINUOUS
FREQUENCY: CONTINUOUS

## 2024-11-12 ASSESSMENT — PULMONARY FUNCTION TESTS
PIF_VALUE: 28
PIF_VALUE: 34
PIF_VALUE: 20
PIF_VALUE: 28
PIF_VALUE: 35
PIF_VALUE: 24
PIF_VALUE: 24
PIF_VALUE: 31
PIF_VALUE: 26
PIF_VALUE: 23
PIF_VALUE: 29
PIF_VALUE: 24
PIF_VALUE: 33
PIF_VALUE: 31
PIF_VALUE: 28
PIF_VALUE: 38
PIF_VALUE: 32
PIF_VALUE: 28
PIF_VALUE: 26
PIF_VALUE: 36
PIF_VALUE: 33
PIF_VALUE: 28
PIF_VALUE: 23
PIF_VALUE: 24
PIF_VALUE: 22
PIF_VALUE: 22
PIF_VALUE: 26
PIF_VALUE: 19
PIF_VALUE: 22
PIF_VALUE: 25
PIF_VALUE: 38
PIF_VALUE: 29
PIF_VALUE: 20
PIF_VALUE: 27

## 2024-11-12 ASSESSMENT — PAIN DESCRIPTION - PAIN TYPE
TYPE: ACUTE PAIN;CHRONIC PAIN
TYPE: ACUTE PAIN;CHRONIC PAIN

## 2024-11-12 ASSESSMENT — PAIN DESCRIPTION - DESCRIPTORS
DESCRIPTORS: ACHING;SORE;THROBBING
DESCRIPTORS: DISCOMFORT;ACHING

## 2024-11-12 ASSESSMENT — PAIN DESCRIPTION - ORIENTATION
ORIENTATION: RIGHT;LEFT
ORIENTATION: RIGHT;LEFT
ORIENTATION: RIGHT;LEFT;OTHER (COMMENT)

## 2024-11-12 ASSESSMENT — PAIN DESCRIPTION - LOCATION
LOCATION: LEG
LOCATION: ARM;LEG;OTHER (COMMENT)

## 2024-11-12 ASSESSMENT — PAIN DESCRIPTION - ONSET
ONSET: ON-GOING
ONSET: ON-GOING
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
## 2024-11-12 NOTE — PROGRESS NOTES
[facility]  MD Laron Muller MD Ali Hassan, MD Lisa Kniska, CNP   Lo Elmore, BLAIRE Davidson, CNP  Progress note   Patient's Name: Glenroy Simmons II  3:15 PM  11/12/2024        10/26 : seen in his room intubated mechanically ventilated fio2 90 peep 10 massively hyperovlemic , UO ok . No pressors .     10/27 seen in his room remain critically ill , mechanically ventilated no pressors .   We did try SLED yesterday and we could not achieve a good ultrafiltration due to lower blood pressure ,we did start him on a Bumex drip today and has been having excellent urine output in the range of 2  an hour per ICU nursing his azotemia is actually slightly better .  His oxygen requirement are higher and better ICU the plan is to do a CT scan    10/28: Seen in the SICU. Critically ill. Mechanically ventilated, FiO2 70 %. Receiving 1 unit PRBCs. He does open his eyes to voice. Remains hypervolemic. Urine output is excellent on Bumex drip. Not on any pressors.     10/29: Seen in the SICU. Remains critically ill. Mechanically ventilated via ETT. FiO2 90 %, PEEP 10. He does open his eyes to voice. Excellent urine output on Bumex drip, over 9 liters yesterday. Hypervolemia is improving. Blood pressure is stable.     10/30:  Remains critically ill.  Mechanically ventilated-->FiO2- 80% peep-10.  Continues on Bumex, Fentanyl, Levo and LR.  Bilateral chest tubes.  Going to OR with ortho.     10/31: Seen in the ICU. Critically ill. Mechanically ventilated. FiO2 75 %. He does open is eyes to voice.  Excellent urine output with Bumex drip. Chest tubes removed today.     11/1: Seen in the SICU. Mechanically ventilated and sedated. FiO2 is 70 %. He does open his eyes to voice. Rate of Bumex drip decreased today. Urine output remains excellent. Hypervolemia improving. Tolerating his tube feedings without issues. Brother is present at the bedside.     11/2: Seen in the ICU.  Remains  hypervolemic. Blood pressure has improved.     11/12: Seen in the SICU. Mechanically intubated via tracheostomy. Vent setting stable. Alert and follows commands. Blood pressure is on the lower side today. Tolerating his tube feedings without issues. Urine output is stable, not much improvement with the lasix. Precert to [employer] started yesterday.     Allergies:  Patient has no known allergies.    Current Medications:    calcium gluconate 2,000 mg in sodium chloride 0.9 % 100 mL IVPB, Once  potassium bicarb-citric acid (EFFER-K) effervescent tablet 20 mEq, Once  potassium & sodium phosphates (PHOS-NAK) 280-160-250 MG packet 500 mg, 4x Daily  lansoprazole (PREVACID SOLUTAB) disintegrating tablet 30 mg, QAM AC  psyllium husk-aspartame (METAMUCIL FIBER) packet 2 packet, Daily  loperamide (IMODIUM) capsule 2 mg, 4x Daily  HYDROmorphone (DILAUDID) injection 0.5 mg, Q4H PRN  oxyCODONE (ROXICODONE) 5 MG/5ML solution 15 mg, Q4H  hydrALAZINE (APRESOLINE) injection 10 mg, Q30 Min PRN  labetalol (NORMODYNE;TRANDATE) injection 10 mg, Q30 Min PRN  cyclobenzaprine (FLEXERIL) tablet 10 mg, TID  meropenem (MERREM) 1,000 mg in sodium chloride 0.9 % 100 mL IVPB, Q8H  acetylcysteine (MUCOMYST) 20 % solution 600 mg, BID RT  guaiFENesin tablet 400 mg, TID  ipratropium 0.5 mg-albuterol 2.5 mg (DUONEB) nebulizer solution 1 Dose, 4x Daily RT  enoxaparin (LOVENOX) injection 60 mg, BID  chlorhexidine (PERIDEX) 0.12 % solution 15 mL, BID  heparin (PF) 100 UNIT/ML injection 100 Units, PRN  ALTEplase (CATHFLO) injection 1 mg, PRN  Polyvinyl Alcohol-Povidone PF (REFRESH) 1.4-0.6 % ophthalmic solution 1 drop, Q4H PRN  gabapentin (NEURONTIN) capsule 300 mg, TID  insulin lispro (HUMALOG,ADMELOG) injection vial 0-16 Units, Q4H  acetaminophen (TYLENOL) 160 MG/5ML solution 650 mg, Q6H  chapstick lip balm, PRN  0.9 % sodium chloride infusion, PRN  sterile water injection 2 mL, PRN  bacitracin zinc ointment, Daily  sodium chloride flush 0.9 % injection

## 2024-11-12 NOTE — PLAN OF CARE
Problem: Discharge Planning  Goal: Discharge to home or other facility with appropriate resources  Outcome: Progressing     Problem: Pain  Goal: Verbalizes/displays adequate comfort level or baseline comfort level  Outcome: Progressing     Problem: Safety - Adult  Goal: Free from fall injury  Outcome: Progressing     Problem: Skin/Tissue Integrity  Goal: Absence of new skin breakdown  Description: 1.  Monitor for areas of redness and/or skin breakdown  2.  Assess vascular access sites hourly  3.  Every 4-6 hours minimum:  Change oxygen saturation probe site  4.  Every 4-6 hours:  If on nasal continuous positive airway pressure, respiratory therapy assess nares and determine need for appliance change or resting period.  Outcome: Progressing     Problem: ABCDS Injury Assessment  Goal: Absence of physical injury  Outcome: Progressing     Problem: Chronic Conditions and Co-morbidities  Goal: Patient's chronic conditions and co-morbidity symptoms are monitored and maintained or improved  Outcome: Progressing     Problem: Neurosensory - Adult  Goal: Achieves stable or improved neurological status  Outcome: Progressing     Problem: Respiratory - Adult  Goal: Achieves optimal ventilation and oxygenation  Outcome: Progressing     Problem: Cardiovascular - Adult  Goal: Maintains optimal cardiac output and hemodynamic stability  Outcome: Progressing     Problem: Gastrointestinal - Adult  Goal: Minimal or absence of nausea and vomiting  Outcome: Progressing     Problem: Genitourinary - Adult  Goal: Absence of urinary retention  Outcome: Progressing     Problem: Anxiety  Goal: Will report anxiety at manageable levels  Description: INTERVENTIONS:  1. Administer medication as ordered  2. Teach and rehearse alternative coping skills  3. Provide emotional support with 1:1 interaction with staff  Outcome: Progressing     Problem: Confusion  Goal: Confusion, delirium, dementia, or psychosis is improved or at baseline  Description:

## 2024-11-13 LAB
ALBUMIN SERPL-MCNC: 2.6 G/DL (ref 3.5–5.2)
ALP SERPL-CCNC: 238 U/L (ref 40–129)
ALT SERPL-CCNC: 51 U/L (ref 0–40)
ANION GAP SERPL CALCULATED.3IONS-SCNC: 8 MMOL/L (ref 7–16)
AST SERPL-CCNC: 38 U/L (ref 0–39)
BASOPHILS # BLD: 0.03 K/UL (ref 0–0.2)
BASOPHILS NFR BLD: 0 % (ref 0–2)
BILIRUB SERPL-MCNC: 1.1 MG/DL (ref 0–1.2)
BUN SERPL-MCNC: 23 MG/DL (ref 6–20)
CA-I BLD-SCNC: 1.1 MMOL/L (ref 1.15–1.33)
CALCIUM SERPL-MCNC: 7.4 MG/DL (ref 8.6–10.2)
CHLORIDE SERPL-SCNC: 106 MMOL/L (ref 98–107)
CO2 SERPL-SCNC: 27 MMOL/L (ref 22–29)
CREAT SERPL-MCNC: 0.8 MG/DL (ref 0.7–1.2)
EOSINOPHIL # BLD: 0.34 K/UL (ref 0.05–0.5)
EOSINOPHILS RELATIVE PERCENT: 2 % (ref 0–6)
ERYTHROCYTE [DISTWIDTH] IN BLOOD BY AUTOMATED COUNT: 19.6 % (ref 11.5–15)
GFR, ESTIMATED: >90 ML/MIN/1.73M2
GLUCOSE BLD-MCNC: 129 MG/DL (ref 74–99)
GLUCOSE BLD-MCNC: 130 MG/DL (ref 74–99)
GLUCOSE BLD-MCNC: 142 MG/DL (ref 74–99)
GLUCOSE BLD-MCNC: 155 MG/DL (ref 74–99)
GLUCOSE BLD-MCNC: 98 MG/DL (ref 74–99)
GLUCOSE SERPL-MCNC: 159 MG/DL (ref 74–99)
HCT VFR BLD AUTO: 25.3 % (ref 37–54)
HGB BLD-MCNC: 7.5 G/DL (ref 12.5–16.5)
IMM GRANULOCYTES # BLD AUTO: 0.09 K/UL (ref 0–0.58)
IMM GRANULOCYTES NFR BLD: 1 % (ref 0–5)
LYMPHOCYTES NFR BLD: 0.95 K/UL (ref 1.5–4)
LYMPHOCYTES RELATIVE PERCENT: 7 % (ref 20–42)
MAGNESIUM SERPL-MCNC: 1.9 MG/DL (ref 1.6–2.6)
MCH RBC QN AUTO: 29.9 PG (ref 26–35)
MCHC RBC AUTO-ENTMCNC: 29.6 G/DL (ref 32–34.5)
MCV RBC AUTO: 100.8 FL (ref 80–99.9)
MONOCYTES NFR BLD: 1.22 K/UL (ref 0.1–0.95)
MONOCYTES NFR BLD: 9 % (ref 2–12)
NEUTROPHILS NFR BLD: 81 % (ref 43–80)
NEUTS SEG NFR BLD: 11.41 K/UL (ref 1.8–7.3)
PHOSPHATE SERPL-MCNC: 3.1 MG/DL (ref 2.5–4.5)
PLATELET # BLD AUTO: 711 K/UL (ref 130–450)
PMV BLD AUTO: 10 FL (ref 7–12)
POTASSIUM SERPL-SCNC: 4 MMOL/L (ref 3.5–5)
PROT SERPL-MCNC: 5.5 G/DL (ref 6.4–8.3)
RBC # BLD AUTO: 2.51 M/UL (ref 3.8–5.8)
SODIUM SERPL-SCNC: 141 MMOL/L (ref 132–146)
WBC OTHER # BLD: 14 K/UL (ref 4.5–11.5)

## 2024-11-13 PROCEDURE — 84100 ASSAY OF PHOSPHORUS: CPT

## 2024-11-13 PROCEDURE — 99291 CRITICAL CARE FIRST HOUR: CPT | Performed by: SURGERY

## 2024-11-13 PROCEDURE — 6360000002 HC RX W HCPCS: Performed by: SURGERY

## 2024-11-13 PROCEDURE — 94003 VENT MGMT INPAT SUBQ DAY: CPT

## 2024-11-13 PROCEDURE — 83735 ASSAY OF MAGNESIUM: CPT

## 2024-11-13 PROCEDURE — 6360000002 HC RX W HCPCS

## 2024-11-13 PROCEDURE — 94640 AIRWAY INHALATION TREATMENT: CPT

## 2024-11-13 PROCEDURE — 2580000003 HC RX 258

## 2024-11-13 PROCEDURE — 82962 GLUCOSE BLOOD TEST: CPT

## 2024-11-13 PROCEDURE — 6370000000 HC RX 637 (ALT 250 FOR IP)

## 2024-11-13 PROCEDURE — 80053 COMPREHEN METABOLIC PANEL: CPT

## 2024-11-13 PROCEDURE — 6370000000 HC RX 637 (ALT 250 FOR IP): Performed by: SURGERY

## 2024-11-13 PROCEDURE — 6360000002 HC RX W HCPCS: Performed by: INTERNAL MEDICINE

## 2024-11-13 PROCEDURE — 82330 ASSAY OF CALCIUM: CPT

## 2024-11-13 PROCEDURE — 36592 COLLECT BLOOD FROM PICC: CPT

## 2024-11-13 PROCEDURE — 2580000003 HC RX 258: Performed by: SURGERY

## 2024-11-13 PROCEDURE — 85025 COMPLETE CBC W/AUTO DIFF WBC: CPT

## 2024-11-13 PROCEDURE — 2000000000 HC ICU R&B

## 2024-11-13 RX ORDER — ASPIRIN 81 MG/1
81 TABLET, CHEWABLE ORAL DAILY
Status: DISCONTINUED | OUTPATIENT
Start: 2024-11-13 | End: 2024-11-14 | Stop reason: HOSPADM

## 2024-11-13 RX ORDER — FUROSEMIDE 10 MG/ML
20 INJECTION INTRAMUSCULAR; INTRAVENOUS 3 TIMES DAILY
Status: DISCONTINUED | OUTPATIENT
Start: 2024-11-13 | End: 2024-11-14 | Stop reason: HOSPADM

## 2024-11-13 RX ORDER — ACETAMINOPHEN 160 MG/5ML
650 LIQUID ORAL EVERY 6 HOURS PRN
Status: DISCONTINUED | OUTPATIENT
Start: 2024-11-13 | End: 2024-11-14 | Stop reason: HOSPADM

## 2024-11-13 RX ADMIN — GABAPENTIN 300 MG: 300 CAPSULE ORAL at 13:49

## 2024-11-13 RX ADMIN — CYCLOBENZAPRINE 10 MG: 10 TABLET, FILM COATED ORAL at 13:47

## 2024-11-13 RX ADMIN — LANSOPRAZOLE 30 MG: 30 TABLET, ORALLY DISINTEGRATING, DELAYED RELEASE ORAL at 05:08

## 2024-11-13 RX ADMIN — HYDROMORPHONE HYDROCHLORIDE 0.5 MG: 1 INJECTION, SOLUTION INTRAMUSCULAR; INTRAVENOUS; SUBCUTANEOUS at 10:22

## 2024-11-13 RX ADMIN — ACETAMINOPHEN 650 MG: 650 SOLUTION ORAL at 01:20

## 2024-11-13 RX ADMIN — IPRATROPIUM BROMIDE AND ALBUTEROL SULFATE 1 DOSE: 2.5; .5 SOLUTION RESPIRATORY (INHALATION) at 08:21

## 2024-11-13 RX ADMIN — HYDROMORPHONE HYDROCHLORIDE 0.5 MG: 1 INJECTION, SOLUTION INTRAMUSCULAR; INTRAVENOUS; SUBCUTANEOUS at 16:22

## 2024-11-13 RX ADMIN — Medication 500 MG: at 13:46

## 2024-11-13 RX ADMIN — GUAIFENESIN 400 MG: 400 TABLET ORAL at 20:41

## 2024-11-13 RX ADMIN — Medication 500 MG: at 09:06

## 2024-11-13 RX ADMIN — Medication 500 MG: at 16:22

## 2024-11-13 RX ADMIN — BACITRACIN ZINC: 500 OINTMENT TOPICAL at 09:23

## 2024-11-13 RX ADMIN — PSYLLIUM HUSK 2 PACKET: 3.4 POWDER ORAL at 09:13

## 2024-11-13 RX ADMIN — CALCIUM GLUCONATE 2000 MG: 98 INJECTION, SOLUTION INTRAVENOUS at 09:34

## 2024-11-13 RX ADMIN — CALCIUM POLYCARBOPHIL 1250 MG: 625 TABLET ORAL at 21:12

## 2024-11-13 RX ADMIN — INSULIN LISPRO 4 UNITS: 100 INJECTION, SOLUTION INTRAVENOUS; SUBCUTANEOUS at 07:54

## 2024-11-13 RX ADMIN — CYCLOBENZAPRINE 10 MG: 10 TABLET, FILM COATED ORAL at 09:06

## 2024-11-13 RX ADMIN — IPRATROPIUM BROMIDE AND ALBUTEROL SULFATE 1 DOSE: 2.5; .5 SOLUTION RESPIRATORY (INHALATION) at 15:58

## 2024-11-13 RX ADMIN — OXYCODONE HYDROCHLORIDE 15 MG: 5 SOLUTION ORAL at 17:36

## 2024-11-13 RX ADMIN — IPRATROPIUM BROMIDE AND ALBUTEROL SULFATE 1 DOSE: 2.5; .5 SOLUTION RESPIRATORY (INHALATION) at 19:34

## 2024-11-13 RX ADMIN — OXYCODONE HYDROCHLORIDE 15 MG: 5 SOLUTION ORAL at 01:19

## 2024-11-13 RX ADMIN — ACETYLCYSTEINE 600 MG: 200 INHALANT RESPIRATORY (INHALATION) at 08:21

## 2024-11-13 RX ADMIN — OXYCODONE HYDROCHLORIDE 15 MG: 5 SOLUTION ORAL at 13:46

## 2024-11-13 RX ADMIN — HYDROMORPHONE HYDROCHLORIDE 0.5 MG: 1 INJECTION, SOLUTION INTRAMUSCULAR; INTRAVENOUS; SUBCUTANEOUS at 20:31

## 2024-11-13 RX ADMIN — ACETYLCYSTEINE 600 MG: 200 INHALANT RESPIRATORY (INHALATION) at 19:34

## 2024-11-13 RX ADMIN — MEROPENEM 1000 MG: 1 INJECTION INTRAVENOUS at 13:45

## 2024-11-13 RX ADMIN — ASPIRIN 81 MG CHEWABLE TABLET 81 MG: 81 TABLET CHEWABLE at 09:05

## 2024-11-13 RX ADMIN — LOPERAMIDE HYDROCHLORIDE 2 MG: 2 CAPSULE ORAL at 09:06

## 2024-11-13 RX ADMIN — MEROPENEM 1000 MG: 1 INJECTION INTRAVENOUS at 21:03

## 2024-11-13 RX ADMIN — OXYCODONE HYDROCHLORIDE 15 MG: 5 SOLUTION ORAL at 22:00

## 2024-11-13 RX ADMIN — LOPERAMIDE HYDROCHLORIDE 2 MG: 2 CAPSULE ORAL at 17:36

## 2024-11-13 RX ADMIN — CHLORHEXIDINE GLUCONATE, 0.12% ORAL RINSE 15 ML: 1.2 SOLUTION DENTAL at 09:05

## 2024-11-13 RX ADMIN — LOPERAMIDE HYDROCHLORIDE 2 MG: 2 CAPSULE ORAL at 20:41

## 2024-11-13 RX ADMIN — MEROPENEM 1000 MG: 1 INJECTION INTRAVENOUS at 04:12

## 2024-11-13 RX ADMIN — ENOXAPARIN SODIUM 60 MG: 100 INJECTION SUBCUTANEOUS at 09:23

## 2024-11-13 RX ADMIN — Medication 500 MG: at 20:41

## 2024-11-13 RX ADMIN — IPRATROPIUM BROMIDE AND ALBUTEROL SULFATE 1 DOSE: 2.5; .5 SOLUTION RESPIRATORY (INHALATION) at 12:10

## 2024-11-13 RX ADMIN — GABAPENTIN 300 MG: 300 CAPSULE ORAL at 09:06

## 2024-11-13 RX ADMIN — GUAIFENESIN 400 MG: 400 TABLET ORAL at 13:47

## 2024-11-13 RX ADMIN — GUAIFENESIN 400 MG: 400 TABLET ORAL at 09:06

## 2024-11-13 RX ADMIN — CYCLOBENZAPRINE 10 MG: 10 TABLET, FILM COATED ORAL at 20:40

## 2024-11-13 RX ADMIN — ENOXAPARIN SODIUM 60 MG: 100 INJECTION SUBCUTANEOUS at 21:12

## 2024-11-13 RX ADMIN — GABAPENTIN 300 MG: 300 CAPSULE ORAL at 20:41

## 2024-11-13 RX ADMIN — ACETAMINOPHEN 650 MG: 650 SOLUTION ORAL at 09:06

## 2024-11-13 RX ADMIN — FUROSEMIDE 20 MG: 10 INJECTION, SOLUTION INTRAMUSCULAR; INTRAVENOUS at 21:59

## 2024-11-13 RX ADMIN — LOPERAMIDE HYDROCHLORIDE 2 MG: 2 CAPSULE ORAL at 13:47

## 2024-11-13 RX ADMIN — OXYCODONE HYDROCHLORIDE 15 MG: 5 SOLUTION ORAL at 04:13

## 2024-11-13 RX ADMIN — INSULIN LISPRO 4 UNITS: 100 INJECTION, SOLUTION INTRAVENOUS; SUBCUTANEOUS at 05:07

## 2024-11-13 RX ADMIN — OXYCODONE HYDROCHLORIDE 15 MG: 5 SOLUTION ORAL at 08:00

## 2024-11-13 ASSESSMENT — PAIN SCALES - WONG BAKER
WONGBAKER_NUMERICALRESPONSE: HURTS A LITTLE BIT
WONGBAKER_NUMERICALRESPONSE: HURTS A LITTLE BIT

## 2024-11-13 ASSESSMENT — PAIN DESCRIPTION - LOCATION
LOCATION: ARM;LEG
LOCATION: LEG
LOCATION: ARM;LEG
LOCATION: LEG

## 2024-11-13 ASSESSMENT — PULMONARY FUNCTION TESTS
PIF_VALUE: 36
PIF_VALUE: 19
PIF_VALUE: 19
PIF_VALUE: 34
PIF_VALUE: 26
PIF_VALUE: 21
PIF_VALUE: 27
PIF_VALUE: 22
PIF_VALUE: 21
PIF_VALUE: 26
PIF_VALUE: 31
PIF_VALUE: 24
PIF_VALUE: 19
PIF_VALUE: 20
PIF_VALUE: 20
PIF_VALUE: 17
PIF_VALUE: 25
PIF_VALUE: 19
PIF_VALUE: 21
PIF_VALUE: 19
PIF_VALUE: 19
PIF_VALUE: 31
PIF_VALUE: 29
PIF_VALUE: 19

## 2024-11-13 ASSESSMENT — PAIN SCALES - GENERAL
PAINLEVEL_OUTOF10: 5
PAINLEVEL_OUTOF10: 10
PAINLEVEL_OUTOF10: 8
PAINLEVEL_OUTOF10: 3
PAINLEVEL_OUTOF10: 4
PAINLEVEL_OUTOF10: 10
PAINLEVEL_OUTOF10: 8
PAINLEVEL_OUTOF10: 6
PAINLEVEL_OUTOF10: 5
PAINLEVEL_OUTOF10: 8
PAINLEVEL_OUTOF10: 10
PAINLEVEL_OUTOF10: 8
PAINLEVEL_OUTOF10: 7

## 2024-11-13 ASSESSMENT — PAIN DESCRIPTION - ORIENTATION
ORIENTATION: RIGHT;LEFT
ORIENTATION: RIGHT;LEFT;LOWER;UPPER
ORIENTATION: RIGHT;LEFT
ORIENTATION: LEFT
ORIENTATION: RIGHT;LEFT
ORIENTATION: RIGHT
ORIENTATION: RIGHT;LEFT

## 2024-11-13 ASSESSMENT — PAIN DESCRIPTION - DESCRIPTORS
DESCRIPTORS: ACHING;CRAMPING;DISCOMFORT
DESCRIPTORS: ACHING;PRESSURE
DESCRIPTORS: ACHING;CRAMPING;DISCOMFORT
DESCRIPTORS: ACHING
DESCRIPTORS: ACHING
DESCRIPTORS: ACHING;DISCOMFORT;PRESSURE

## 2024-11-13 ASSESSMENT — PAIN DESCRIPTION - PAIN TYPE
TYPE: ACUTE PAIN
TYPE: ACUTE PAIN

## 2024-11-13 ASSESSMENT — PAIN - FUNCTIONAL ASSESSMENT: PAIN_FUNCTIONAL_ASSESSMENT: PREVENTS OR INTERFERES WITH MANY ACTIVE NOT PASSIVE ACTIVITIES

## 2024-11-13 NOTE — PLAN OF CARE
Problem: Discharge Planning  Goal: Discharge to home or other facility with appropriate resources  11/13/2024 0921 by Ellen Gomez RN  Outcome: Progressing  11/12/2024 2034 by Anand Escamilla RN  Outcome: Progressing     Problem: Pain  Goal: Verbalizes/displays adequate comfort level or baseline comfort level  11/13/2024 0921 by Ellen Gomez RN  Outcome: Progressing  11/12/2024 2034 by Anand Escamilla RN  Outcome: Progressing     Problem: Safety - Adult  Goal: Free from fall injury  11/13/2024 0921 by Ellen Gomez RN  Outcome: Progressing  11/12/2024 2034 by Anand Escamilla RN  Outcome: Progressing     Problem: Skin/Tissue Integrity  Goal: Absence of new skin breakdown  11/13/2024 0921 by Ellen Gomez RN  Outcome: Progressing  11/12/2024 2034 by Anand Escamilla RN  Outcome: Progressing     Problem: ABCDS Injury Assessment  Goal: Absence of physical injury  11/13/2024 0921 by Ellen Gomez RN  Outcome: Progressing  11/12/2024 2034 by Anand Escamilla RN  Outcome: Progressing     Problem: Chronic Conditions and Co-morbidities  Goal: Patient's chronic conditions and co-morbidity symptoms are monitored and maintained or improved  11/13/2024 0921 by Ellen Gomez RN  Outcome: Progressing  11/12/2024 2034 by Anand Escamilla RN  Outcome: Progressing     Problem: Neurosensory - Adult  Goal: Achieves stable or improved neurological status  11/13/2024 0921 by Ellen Gomez RN  Outcome: Progressing  11/12/2024 2034 by Anand Escamilla RN  Outcome: Progressing  Goal: Achieves maximal functionality and self care  11/13/2024 0921 by Ellen Gomez RN  Outcome: Progressing  11/12/2024 2034 by Anand Escamilla RN  Outcome: Progressing     Problem: Respiratory - Adult  Goal: Achieves optimal ventilation and oxygenation  11/13/2024 0921 by Ellen Gomez RN  Outcome: Progressing  11/12/2024 2034 by Anand Escamilla RN  Outcome: Progressing     Problem: Cardiovascular - Adult  Goal: Maintains optimal cardiac output and  hemodynamic stability  11/13/2024 0921 by Ellen Gomez, RN  Outcome: Progressing  11/12/2024 2034 by Anand Escamilla, RN  Outcome: Progressing  Goal: Absence of cardiac dysrhythmias or at baseline  Outcome: Progressing     Problem: Musculoskeletal - Adult  Goal: Return mobility to safest level of function  Outcome: Progressing     Problem: Gastrointestinal - Adult  Goal: Minimal or absence of nausea and vomiting  Outcome: Progressing     Problem: Genitourinary - Adult  Goal: Absence of urinary retention  Outcome: Progressing     Problem: Metabolic/Fluid and Electrolytes - Adult  Goal: Electrolytes maintained within normal limits  Outcome: Progressing     Problem: Hematologic - Adult  Goal: Maintains hematologic stability  Outcome: Progressing     Problem: Anxiety  Goal: Will report anxiety at manageable levels  Outcome: Progressing     Problem: Coping  Goal: Pt/Family able to verbalize concerns and demonstrate effective coping strategies  Outcome: Progressing     Problem: Confusion  Goal: Confusion, delirium, dementia, or psychosis is improved or at baseline  Outcome: Progressing     Problem: Behavior  Goal: Pt/Family maintain appropriate behavior and adhere to behavioral management agreement, if implemented  Outcome: Progressing     Problem: Nutrition Deficit:  Goal: Optimize nutritional status  Outcome: Progressing

## 2024-11-13 NOTE — PROGRESS NOTES
11/13/24 0828   Patient Observation   Patient Observations FiO2 weaned and pt placed on PS at this time   Vent Information   Vent Mode (S)  CPAP/PS   Ventilator Settings   FiO2  45 %   PEEP/CPAP (cmH2O) 10   Pressure Support (cm H2O) (S)  10 cm H2O   Vent Patient Data (Readings)   Flow Sensitivity 3 L/min   Backup Apnea On   Backup Rate 16 Breaths Per Minute   Backup Vt 500   Vent Alarm Settings   Low Minute Volume (lpm) 7 L/min   High Minute Volume (lpm) 20.5 L/min   Low Exhaled Vt (ml) 400 mL   High Exhaled Vt (ml) 940 mL   RR High (bpm) 35 br/min   Apnea (secs) 20 secs   Additional Respiratoray Assessments   Humidification Source Heated wire   Humidification Temp 37   Circuit Condensation Drained   Ambu Bag With Mask At Bedside Yes   Airway Clearance   Suction Trach   Subglottic Suction Done No   Suction Device Inline suction catheter   Sputum Method Obtained Tracheal   Sputum Amount Moderate   Sputum Color/Odor Other (comment)  (cream)   Sputum Consistency Thick   Surgical Airway  11/08/24 Thu Cuffed   Placement Date/Time: 11/08/24 0930   Placed By: Licensed provider  Placement Verified By: Direct visualization  Surgical Airway Type: Tracheostomy  Brand: Thu  Style: Cuffed   Status Secured   Site Assessment Dry   Spare Trach at Bedside No   Spare Trach Tube One Size Smaller at Bedside No   Ambu Bag With Mask at Bedside Yes   Ventilator Associated Pneumonia Bundle   Elevation of Head of Bed to 30-45 Degrees  Yes   Oral Suctioning Yes   ETT/Trach Suctioning Yes

## 2024-11-13 NOTE — PROGRESS NOTES
11/12/24 2028   Patient Observation   Pulse (!) 113   SpO2 (!) 87 %   Vent Information   Equipment Changed Expiratory Filter   Vent Mode AC/PRVC   Ventilator Settings   FiO2  45 %   Insp Time (sec) 0.7 sec   Vt (Set, mL) 500 mL   Resp Rate (Set) 16 bpm   PEEP/CPAP (cmH2O) 10   Vent Patient Data (Readings)   Vt (Measured) 43 mL   Peak Inspiratory Pressure (cmH2O) 22 cmH2O   Rate Measured 17 br/min   Minute Volume (L/min) 8.41 Liters   Mean Airway Pressure (cmH2O) 13 cmH20   Plateau Pressure (cm H2O) 16 cm H2O   Driving Pressure 6   Inspiratory Time 0.7 sec   I:E Ratio 1.20:1   Flow Sensitivity 3 L/min   Static Compliance (L/cm H2O) 37   I Time/ I Time % 0 s   Backup Apnea On   Backup Rate 16 Breaths Per Minute   Backup Vt 500   Vent Alarm Settings   High Pressure (cmH2O) 40 cmH2O   Low Minute Volume (lpm) 7 L/min   High Minute Volume (lpm) 20 L/min   Low Exhaled Vt (ml) 400 mL   High Exhaled Vt (ml) 800 mL   RR High (bpm) 35 br/min   Apnea (secs) 20 secs   Additional Respiratoray Assessments   Humidification Source Heated wire   Humidification Temp 37   Circuit Condensation Drained   Ambu Bag With Mask At Bedside Yes   Airway Clearance   Suction Trach   Suction Device Inline suction catheter   Sputum Method Obtained Endotracheal   Sputum Amount Small   Sputum Color/Odor Tan   Sputum Consistency Thick

## 2024-11-13 NOTE — CONSENT
Informed Consent for Blood Component Transfusion Note    I have discussed with the patient the rationale for blood component transfusion; its benefits in treating or preventing fatigue, organ damage, or death; and its risk which includes mild transfusion reactions, rare risk of blood borne infection, or more serious but rare reactions. I have discussed the alternatives to transfusion, including the risk and consequences of not receiving transfusion. The patient had an opportunity to ask questions and had agreed to proceed with transfusion of blood components.    Electronically signed by Annelise Pedroza DO on 11/13/24 at 8:35 AM EST
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
## 2024-11-13 NOTE — PROGRESS NOTES
11/13/24 1215   Patient Observation   Pulse (!) 101   Respirations (!) 5   SpO2 100 %   Patient Observations FiO2 and PS decreased at this time   Ventilator Settings   FiO2  (S)  40 %   PEEP/CPAP (cmH2O) 10   Pressure Support (cm H2O) (S)  8 cm H2O   Vent Patient Data (Readings)   Vt (Measured) 826 mL   Peak Inspiratory Pressure (cmH2O) 19 cmH2O   Rate Measured 18 br/min   Minute Volume (L/min) 10.3 Liters   Mean Airway Pressure (cmH2O) 13 cmH20   Plateau Pressure (cm H2O) 22 cm H2O   Driving Pressure 12   I:E Ratio 1:2.00   Vent Alarm Settings   High Pressure (cmH2O) 40 cmH2O   Low Exhaled Vt (ml) 0 mL

## 2024-11-13 NOTE — PROGRESS NOTES
11/12/24 2132   Patient Observation   Pulse (!) 112   SpO2 (!) 86 %   Vent Information   Vent Mode AC/PRVC   Ventilator Settings   FiO2  95 %   Insp Time (sec) 0.75 sec   Vt (Set, mL) 500 mL   Resp Rate (Set) 16 bpm   PEEP/CPAP (cmH2O) 10   Vent Patient Data (Readings)   Vt (Measured) 327 mL   Peak Inspiratory Pressure (cmH2O) 26 cmH2O   Rate Measured 22 br/min   Minute Volume (L/min) 10.6 Liters   Mean Airway Pressure (cmH2O) 15 cmH20   Plateau Pressure (cm H2O) 16 cm H2O   Driving Pressure 6   Inspiratory Time 0.75 sec   I:E Ratio 1:3.50   Flow Sensitivity 2.5 L/min   I Time/ I Time % 0 s   Backup Apnea On   Backup Rate 16 Breaths Per Minute   Backup Vt 500   Vent Alarm Settings   High Pressure (cmH2O) 40 cmH2O   Low Minute Volume (lpm) 6 L/min   High Minute Volume (lpm) 21 L/min   Low Exhaled Vt (ml) 325 mL   High Exhaled Vt (ml) 800 mL   RR High (bpm) 45 br/min   Apnea (secs) 20 secs   Additional Respiratoray Assessments   Humidification Source Heated wire   Humidification Temp 37   Circuit Condensation Drained   Ambu Bag With Mask At Bedside Yes

## 2024-11-13 NOTE — CARE COORDINATION
11/13 Care Coordination: Pt remains in SICU. Cont vent/Trach/Peg. Discharge plan remains [facility]. Initial Denial. Will need to do P2P today. CM/SW will continue to follow for discharge planning.   Toan SANTACRUZN,RN--BC  [phone]

## 2024-11-13 NOTE — FLOWSHEET NOTE
11/13/24 0800   Treatment Team Notification   Reason for Communication Evaluate  (Picc site red, edema and warm to touch)   Name of Team Member Notified Dr Pedroza   Treatment Team Role Resident   Method of Communication Face to face   Response No new orders  (resident will let attending know of findings)   Notification Time 0900

## 2024-11-13 NOTE — PROGRESS NOTES
11/12/24 2120   Patient Observation   Pulse (!) 111   SpO2 (!) 85 %   Vent Information   Vent Mode AC/PRVC   Ventilator Settings   FiO2  (S)  100 %  (Increased for desat.)   Insp Time (sec) 0.7 sec   Vt (Set, mL) 500 mL   Resp Rate (Set) 16 bpm   PEEP/CPAP (cmH2O) 10   Vent Patient Data (Readings)   Vt (Measured) 500 mL   Peak Inspiratory Pressure (cmH2O) 26 cmH2O   Rate Measured 17 br/min   Minute Volume (L/min) 9.15 Liters   Mean Airway Pressure (cmH2O) 13 cmH20   Plateau Pressure (cm H2O) 16 cm H2O   Driving Pressure 6   Inspiratory Time 0.7 sec   I:E Ratio 1:6.20   Flow Sensitivity 3 L/min   I Time/ I Time % 0 s   Backup Apnea On   Backup Rate 16 Breaths Per Minute   Backup Vt 500   Vent Alarm Settings   High Pressure (cmH2O) 40 cmH2O   Low Minute Volume (lpm) 7 L/min   High Minute Volume (lpm) 20 L/min   Low Exhaled Vt (ml) 400 mL   High Exhaled Vt (ml) 800 mL   RR High (bpm) 35 br/min   Apnea (secs) 20 secs   Additional Respiratoray Assessments   Humidification Source Heated wire   Humidification Temp 37   Circuit Condensation Drained   Ambu Bag With Mask At Bedside Yes   Airway Clearance   Suction Trach   Suction Device Inline suction catheter   Sputum Method Obtained Endotracheal   Sputum Amount Small   Sputum Color/Odor Tan   Sputum Consistency Thick
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
## 2024-11-14 VITALS
BODY MASS INDEX: 41.75 KG/M2 | SYSTOLIC BLOOD PRESSURE: 115 MMHG | DIASTOLIC BLOOD PRESSURE: 67 MMHG | HEART RATE: 106 BPM | TEMPERATURE: 99.9 F | OXYGEN SATURATION: 98 % | WEIGHT: 315 LBS | RESPIRATION RATE: 15 BRPM | HEIGHT: 73 IN

## 2024-11-14 LAB
ALBUMIN SERPL-MCNC: 2.8 G/DL (ref 3.5–5.2)
ALP SERPL-CCNC: 234 U/L (ref 40–129)
ALT SERPL-CCNC: 44 U/L (ref 0–40)
ANION GAP SERPL CALCULATED.3IONS-SCNC: 6 MMOL/L (ref 7–16)
AST SERPL-CCNC: 31 U/L (ref 0–39)
BASOPHILS # BLD: 0.02 K/UL (ref 0–0.2)
BASOPHILS NFR BLD: 0 % (ref 0–2)
BILIRUB SERPL-MCNC: 1.1 MG/DL (ref 0–1.2)
BUN SERPL-MCNC: 20 MG/DL (ref 6–20)
CA-I BLD-SCNC: 1.12 MMOL/L (ref 1.15–1.33)
CALCIUM SERPL-MCNC: 7.9 MG/DL (ref 8.6–10.2)
CHLORIDE SERPL-SCNC: 103 MMOL/L (ref 98–107)
CO2 SERPL-SCNC: 29 MMOL/L (ref 22–29)
CREAT SERPL-MCNC: 0.7 MG/DL (ref 0.7–1.2)
EOSINOPHIL # BLD: 0.29 K/UL (ref 0.05–0.5)
EOSINOPHILS RELATIVE PERCENT: 3 % (ref 0–6)
ERYTHROCYTE [DISTWIDTH] IN BLOOD BY AUTOMATED COUNT: 19.1 % (ref 11.5–15)
GFR, ESTIMATED: >90 ML/MIN/1.73M2
GLUCOSE BLD-MCNC: 125 MG/DL (ref 74–99)
GLUCOSE BLD-MCNC: 141 MG/DL (ref 74–99)
GLUCOSE SERPL-MCNC: 137 MG/DL (ref 74–99)
HCT VFR BLD AUTO: 25.8 % (ref 37–54)
HGB BLD-MCNC: 7.6 G/DL (ref 12.5–16.5)
IMM GRANULOCYTES # BLD AUTO: 0.06 K/UL (ref 0–0.58)
IMM GRANULOCYTES NFR BLD: 1 % (ref 0–5)
LYMPHOCYTES NFR BLD: 1.26 K/UL (ref 1.5–4)
LYMPHOCYTES RELATIVE PERCENT: 11 % (ref 20–42)
MAGNESIUM SERPL-MCNC: 1.9 MG/DL (ref 1.6–2.6)
MCH RBC QN AUTO: 29.5 PG (ref 26–35)
MCHC RBC AUTO-ENTMCNC: 29.5 G/DL (ref 32–34.5)
MCV RBC AUTO: 100 FL (ref 80–99.9)
MICROORGANISM SPEC CULT: NORMAL
MICROORGANISM/AGENT SPEC: NORMAL
MONOCYTES NFR BLD: 1.06 K/UL (ref 0.1–0.95)
MONOCYTES NFR BLD: 9 % (ref 2–12)
NEUTROPHILS NFR BLD: 77 % (ref 43–80)
NEUTS SEG NFR BLD: 9.05 K/UL (ref 1.8–7.3)
PHOSPHATE SERPL-MCNC: 3.1 MG/DL (ref 2.5–4.5)
PLATELET # BLD AUTO: 733 K/UL (ref 130–450)
PMV BLD AUTO: 9.9 FL (ref 7–12)
POTASSIUM SERPL-SCNC: 4.6 MMOL/L (ref 3.5–5)
PROT SERPL-MCNC: 5.5 G/DL (ref 6.4–8.3)
RBC # BLD AUTO: 2.58 M/UL (ref 3.8–5.8)
SODIUM SERPL-SCNC: 138 MMOL/L (ref 132–146)
SPECIMEN DESCRIPTION: NORMAL
WBC OTHER # BLD: 11.7 K/UL (ref 4.5–11.5)

## 2024-11-14 PROCEDURE — 99238 HOSP IP/OBS DSCHRG MGMT 30/<: CPT | Performed by: SURGERY

## 2024-11-14 PROCEDURE — 94003 VENT MGMT INPAT SUBQ DAY: CPT

## 2024-11-14 PROCEDURE — 94640 AIRWAY INHALATION TREATMENT: CPT

## 2024-11-14 PROCEDURE — 6360000002 HC RX W HCPCS: Performed by: SURGERY

## 2024-11-14 PROCEDURE — 2580000003 HC RX 258: Performed by: SURGERY

## 2024-11-14 PROCEDURE — 36592 COLLECT BLOOD FROM PICC: CPT

## 2024-11-14 PROCEDURE — 6370000000 HC RX 637 (ALT 250 FOR IP)

## 2024-11-14 PROCEDURE — 82962 GLUCOSE BLOOD TEST: CPT

## 2024-11-14 PROCEDURE — 6370000000 HC RX 637 (ALT 250 FOR IP): Performed by: SURGERY

## 2024-11-14 PROCEDURE — 85025 COMPLETE CBC W/AUTO DIFF WBC: CPT

## 2024-11-14 PROCEDURE — 6360000002 HC RX W HCPCS

## 2024-11-14 PROCEDURE — 80053 COMPREHEN METABOLIC PANEL: CPT

## 2024-11-14 PROCEDURE — 6360000002 HC RX W HCPCS: Performed by: INTERNAL MEDICINE

## 2024-11-14 PROCEDURE — 83735 ASSAY OF MAGNESIUM: CPT

## 2024-11-14 PROCEDURE — 84100 ASSAY OF PHOSPHORUS: CPT

## 2024-11-14 PROCEDURE — 82330 ASSAY OF CALCIUM: CPT

## 2024-11-14 RX ORDER — GABAPENTIN 300 MG/1
300 CAPSULE ORAL 3 TIMES DAILY
DISCHARGE
Start: 2024-11-14 | End: 2025-01-13

## 2024-11-14 RX ORDER — GUAIFENESIN 400 MG/1
400 TABLET ORAL 3 TIMES DAILY
DISCHARGE
Start: 2024-11-14

## 2024-11-14 RX ORDER — IPRATROPIUM BROMIDE AND ALBUTEROL SULFATE 2.5; .5 MG/3ML; MG/3ML
3 SOLUTION RESPIRATORY (INHALATION)
DISCHARGE
Start: 2024-11-14

## 2024-11-14 RX ORDER — LABETALOL HYDROCHLORIDE 5 MG/ML
10 INJECTION, SOLUTION INTRAVENOUS EVERY 30 MIN PRN
DISCHARGE
Start: 2024-11-14

## 2024-11-14 RX ORDER — CHLORHEXIDINE GLUCONATE ORAL RINSE 1.2 MG/ML
15 SOLUTION DENTAL 2 TIMES DAILY
DISCHARGE
Start: 2024-11-14 | End: 2024-11-28

## 2024-11-14 RX ORDER — GLUCAGON 1 MG/ML
1 KIT INJECTION PRN
DISCHARGE
Start: 2024-11-14

## 2024-11-14 RX ORDER — LANSOPRAZOLE 30 MG/1
30 TABLET, ORALLY DISINTEGRATING, DELAYED RELEASE ORAL
DISCHARGE
Start: 2024-11-15

## 2024-11-14 RX ORDER — FUROSEMIDE 10 MG/ML
20 INJECTION INTRAMUSCULAR; INTRAVENOUS 3 TIMES DAILY
DISCHARGE
Start: 2024-11-14

## 2024-11-14 RX ORDER — PROCHLORPERAZINE EDISYLATE 5 MG/ML
10 INJECTION INTRAMUSCULAR; INTRAVENOUS EVERY 6 HOURS PRN
DISCHARGE
Start: 2024-11-14

## 2024-11-14 RX ORDER — ACETYLCYSTEINE 200 MG/ML
600 SOLUTION ORAL; RESPIRATORY (INHALATION)
DISCHARGE
Start: 2024-11-14

## 2024-11-14 RX ORDER — ASPIRIN 81 MG/1
81 TABLET, CHEWABLE ORAL DAILY
DISCHARGE
Start: 2024-11-15

## 2024-11-14 RX ORDER — INSULIN LISPRO 100 [IU]/ML
0-16 INJECTION, SOLUTION INTRAVENOUS; SUBCUTANEOUS EVERY 4 HOURS
DISCHARGE
Start: 2024-11-14

## 2024-11-14 RX ORDER — BACITRACIN ZINC 500 [USP'U]/G
OINTMENT TOPICAL
DISCHARGE
Start: 2024-11-15 | End: 2024-11-24

## 2024-11-14 RX ORDER — OXYCODONE HCL 5 MG/5 ML
15 SOLUTION, ORAL ORAL EVERY 4 HOURS
Status: SHIPPED | DISCHARGE
Start: 2024-11-14 | End: 2024-11-17

## 2024-11-14 RX ORDER — ONDANSETRON 4 MG/1
4 TABLET, ORALLY DISINTEGRATING ORAL EVERY 8 HOURS PRN
DISCHARGE
Start: 2024-11-14

## 2024-11-14 RX ORDER — HYDRALAZINE HYDROCHLORIDE 20 MG/ML
10 INJECTION INTRAMUSCULAR; INTRAVENOUS EVERY 30 MIN PRN
DISCHARGE
Start: 2024-11-14

## 2024-11-14 RX ORDER — ENOXAPARIN SODIUM 100 MG/ML
60 INJECTION SUBCUTANEOUS 2 TIMES DAILY
DISCHARGE
Start: 2024-11-14

## 2024-11-14 RX ORDER — CYCLOBENZAPRINE HCL 10 MG
10 TABLET ORAL 3 TIMES DAILY
DISCHARGE
Start: 2024-11-14 | End: 2024-11-24

## 2024-11-14 RX ORDER — LOPERAMIDE HYDROCHLORIDE 2 MG/1
2 CAPSULE ORAL 4 TIMES DAILY
DISCHARGE
Start: 2024-11-14 | End: 2024-11-24

## 2024-11-14 RX ADMIN — HYDROMORPHONE HYDROCHLORIDE 0.5 MG: 1 INJECTION, SOLUTION INTRAMUSCULAR; INTRAVENOUS; SUBCUTANEOUS at 03:51

## 2024-11-14 RX ADMIN — PSYLLIUM HUSK 2 PACKET: 3.4 POWDER ORAL at 08:15

## 2024-11-14 RX ADMIN — LANSOPRAZOLE 30 MG: 30 TABLET, ORALLY DISINTEGRATING, DELAYED RELEASE ORAL at 08:18

## 2024-11-14 RX ADMIN — IPRATROPIUM BROMIDE AND ALBUTEROL SULFATE 1 DOSE: 2.5; .5 SOLUTION RESPIRATORY (INHALATION) at 08:17

## 2024-11-14 RX ADMIN — ASPIRIN 81 MG CHEWABLE TABLET 81 MG: 81 TABLET CHEWABLE at 08:12

## 2024-11-14 RX ADMIN — GUAIFENESIN 400 MG: 400 TABLET ORAL at 08:12

## 2024-11-14 RX ADMIN — GABAPENTIN 300 MG: 300 CAPSULE ORAL at 08:12

## 2024-11-14 RX ADMIN — BACITRACIN ZINC: 500 OINTMENT TOPICAL at 08:15

## 2024-11-14 RX ADMIN — LOPERAMIDE HYDROCHLORIDE 2 MG: 2 CAPSULE ORAL at 08:12

## 2024-11-14 RX ADMIN — CYCLOBENZAPRINE 10 MG: 10 TABLET, FILM COATED ORAL at 08:13

## 2024-11-14 RX ADMIN — OXYCODONE HYDROCHLORIDE 15 MG: 5 SOLUTION ORAL at 02:13

## 2024-11-14 RX ADMIN — MEROPENEM 1000 MG: 1 INJECTION INTRAVENOUS at 05:12

## 2024-11-14 RX ADMIN — CALCIUM POLYCARBOPHIL 1250 MG: 625 TABLET ORAL at 08:14

## 2024-11-14 RX ADMIN — ACETYLCYSTEINE 600 MG: 200 INHALANT RESPIRATORY (INHALATION) at 08:17

## 2024-11-14 RX ADMIN — OXYCODONE HYDROCHLORIDE 15 MG: 5 SOLUTION ORAL at 06:18

## 2024-11-14 RX ADMIN — Medication 500 MG: at 08:12

## 2024-11-14 RX ADMIN — OXYCODONE HYDROCHLORIDE 15 MG: 5 SOLUTION ORAL at 08:11

## 2024-11-14 RX ADMIN — ACETAMINOPHEN 650 MG: 650 SOLUTION ORAL at 05:30

## 2024-11-14 RX ADMIN — FUROSEMIDE 20 MG: 10 INJECTION, SOLUTION INTRAMUSCULAR; INTRAVENOUS at 08:15

## 2024-11-14 RX ADMIN — CALCIUM GLUCONATE 2000 MG: 98 INJECTION, SOLUTION INTRAVENOUS at 09:23

## 2024-11-14 RX ADMIN — CHLORHEXIDINE GLUCONATE, 0.12% ORAL RINSE 15 ML: 1.2 SOLUTION DENTAL at 08:12

## 2024-11-14 RX ADMIN — HYDROMORPHONE HYDROCHLORIDE 0.5 MG: 1 INJECTION, SOLUTION INTRAMUSCULAR; INTRAVENOUS; SUBCUTANEOUS at 00:43

## 2024-11-14 RX ADMIN — ENOXAPARIN SODIUM 60 MG: 100 INJECTION SUBCUTANEOUS at 08:18

## 2024-11-14 ASSESSMENT — PAIN SCALES - GENERAL
PAINLEVEL_OUTOF10: 3
PAINLEVEL_OUTOF10: 4
PAINLEVEL_OUTOF10: 7
PAINLEVEL_OUTOF10: 4
PAINLEVEL_OUTOF10: 7
PAINLEVEL_OUTOF10: 0
PAINLEVEL_OUTOF10: 4
PAINLEVEL_OUTOF10: 6
PAINLEVEL_OUTOF10: 8

## 2024-11-14 ASSESSMENT — PAIN DESCRIPTION - ORIENTATION
ORIENTATION: RIGHT;LEFT
ORIENTATION: LEFT;RIGHT
ORIENTATION: RIGHT;LEFT

## 2024-11-14 ASSESSMENT — PAIN - FUNCTIONAL ASSESSMENT: PAIN_FUNCTIONAL_ASSESSMENT: PREVENTS OR INTERFERES SOME ACTIVE ACTIVITIES AND ADLS

## 2024-11-14 ASSESSMENT — PULMONARY FUNCTION TESTS
PIF_VALUE: 19

## 2024-11-14 ASSESSMENT — PAIN DESCRIPTION - DESCRIPTORS
DESCRIPTORS: ACHING;SHARP
DESCRIPTORS: CRUSHING;ACHING
DESCRIPTORS: ACHING;SHARP
DESCRIPTORS: SORE;TENDER

## 2024-11-14 ASSESSMENT — PAIN DESCRIPTION - PAIN TYPE
TYPE: ACUTE PAIN

## 2024-11-14 ASSESSMENT — PAIN DESCRIPTION - FREQUENCY
FREQUENCY: CONTINUOUS

## 2024-11-14 ASSESSMENT — PAIN DESCRIPTION - LOCATION
LOCATION: LEG
LOCATION: LEG
LOCATION: ARM;LEG
LOCATION: LEG
LOCATION: LEG
LOCATION: ARM;LEG

## 2024-11-14 ASSESSMENT — PAIN DESCRIPTION - ONSET
ONSET: ON-GOING
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
## 2024-11-14 NOTE — DISCHARGE INSTR - COC
Continuity of Care Form    Patient Name: Glenroy Simmons II   :  1975  MRN:  [id number]    Admit date:  10/24/2024  Discharge date:  2024    Code Status Order: Full Code   Advance Directives:   Advance Care Flowsheet Documentation        Date/Time Healthcare Directive Type of Healthcare Directive Copy in Chart Healthcare Agent Appointed Healthcare Agent's Name Healthcare Agent's Phone Number    10/24/24 17:10:15 No, patient does not have an advance directive for healthcare treatment  --  --  --  --  --                     Admitting Physician:  Yazan Schaffer MD  PCP: Alek Jimenez DO    Discharging Nurse: Ellen Gomez rn  Discharging Hospital Unit/Room#: 3806/3806-A  Discharging Unit Phone Number: [phone]    Emergency Contact:   Extended Emergency Contact Information  Primary Emergency Contact: Ben Simmons  Mobile Phone: [phone]  Relation: Brother/Sister   needed? No  Secondary Emergency Contact: SimmonsLawrence  Mobile Phone: [phone]  Relation: Child  Preferred language: English   needed? No    Past Surgical History:  Past Surgical History:   Procedure Laterality Date    IR EMBOLIZATION HEMORRHAGE  10/24/2024    IR EMBOLIZATION HEMORRHAGE 10/24/2024 Froilan, Ayde Carranza MD [facility] PROCEDURES    LEG SURGERY Left 11/3/2024    LEFT TIBIAL PLATEAU OPEN REDUCTION INTERNAL FIXATION performed by Yusuf Werner DO at [facility] OR    PELVIC FRACTURE SURGERY Right 10/30/2024    PELVIS OPEN REDUCTION INTERNAL FIXATION ANTERIOR/ REVISION OF RIGHT HIP ARTHROPLASTY performed by Yusuf Werner DO at [facility] OR    TRACHEAL SURGERY N/A 2024    TRACHEOTOMY PERCUTANEOUS BRONCHOSCOPY/bedside performed by Yazan Schaffer MD at [facility] ENDOSCOPY    UPPER GASTROINTESTINAL ENDOSCOPY N/A 2024    ESOPHAGOGASTRODUODENOSCOPY PERCUTANEOUS ENDOSCOPIC GASTROSTOMY TUBE INSERTION performed by Yazan Schaffer MD at [facility]COPY       Immunization History:   Immunization

## 2024-11-14 NOTE — PLAN OF CARE
Problem: Discharge Planning  Goal: Discharge to home or other facility with appropriate resources  Outcome: Progressing     Problem: Pain  Goal: Verbalizes/displays adequate comfort level or baseline comfort level  Outcome: Progressing     Problem: Safety - Adult  Goal: Free from fall injury  Outcome: Progressing     Problem: Skin/Tissue Integrity  Goal: Absence of new skin breakdown  Description: 1.  Monitor for areas of redness and/or skin breakdown  2.  Assess vascular access sites hourly  3.  Every 4-6 hours minimum:  Change oxygen saturation probe site  4.  Every 4-6 hours:  If on nasal continuous positive airway pressure, respiratory therapy assess nares and determine need for appliance change or resting period.  Outcome: Progressing     Problem: ABCDS Injury Assessment  Goal: Absence of physical injury  Outcome: Progressing     Problem: Chronic Conditions and Co-morbidities  Goal: Patient's chronic conditions and co-morbidity symptoms are monitored and maintained or improved  Outcome: Progressing     Problem: Cardiovascular - Adult  Goal: Maintains optimal cardiac output and hemodynamic stability  Outcome: Progressing  Goal: Absence of cardiac dysrhythmias or at baseline  Outcome: Progressing     Problem: Genitourinary - Adult  Goal: Absence of urinary retention  Outcome: Progressing  Goal: Urinary catheter remains patent  Outcome: Progressing
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
## 2024-11-14 NOTE — CARE COORDINATION
11/14 Care Coordination: Plan for discharge to [facility] today. [facility] will transport, should be her 0945 with 2 hr window. Pt's son Lawrence updated on discharge and new room at [facility] (201). CM/SW will continue to follow for discharge planning.   Toan SANTACRUZN,RN--BC  [phone]
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
## 2024-11-15 ENCOUNTER — TELEPHONE (OUTPATIENT)
Dept: ORTHOPEDIC SURGERY | Age: 49
End: 2024-11-15

## 2024-11-15 DIAGNOSIS — S32.811D MULTIPLE CLOSED FRACTURES OF PELVIS WITH UNSTABLE DISRUPTION OF PELVIC RING WITH ROUTINE HEALING, SUBSEQUENT ENCOUNTER: Primary | ICD-10-CM

## 2024-11-15 LAB
MICROORGANISM SPEC CULT: NORMAL
MICROORGANISM SPEC CULT: NORMAL
SERVICE CMNT-IMP: NORMAL
SERVICE CMNT-IMP: NORMAL
SPECIMEN DESCRIPTION: NORMAL
SPECIMEN DESCRIPTION: NORMAL

## 2024-11-15 NOTE — TELEPHONE ENCOUNTER
[facility] called requesting verbal orders for staple and suture removal from Right hip, pelvis, and left tibia & Knee DOS 10/30/24 and Left Tibial Plat ORIF DOS 11/03/24.    Wound care RN states incisions well approximated, no drainage, no redness or warm to touch. States patient is still intubated and unstable for transport to Ortho office.    Also requesting to repeat XR along with updated images of incisions to fax to Ortho office for providers review.    Routing to providers at this time for review and possible verbal orders for [facility] for suture removal and repeat imaging.

## 2024-11-15 NOTE — TELEPHONE ENCOUNTER
Okay to remove sutures right hip.  Left knee sutures should be left in at least until after 11/17.  After suture removal, apply Steri-Strips for least 7 days.  Okay to repeat x-rays per facility.  Please have results faxed office.  Okay to fax incision pictures to our office with x-ray results.

## 2024-11-18 ENCOUNTER — HOSPITAL ENCOUNTER (OUTPATIENT)
Dept: CT IMAGING | Age: 49
Discharge: HOME | End: 2024-11-20

## 2024-11-18 PROCEDURE — 71250 CT THORAX DX C-: CPT

## 2024-11-18 PROCEDURE — 74177 CT ABD & PELVIS W/CONTRAST: CPT

## 2024-11-18 PROCEDURE — 6360000004 HC RX CONTRAST MEDICATION: Performed by: RADIOLOGY

## 2024-11-18 RX ORDER — IOPAMIDOL 755 MG/ML
75 INJECTION, SOLUTION INTRAVASCULAR
Status: COMPLETED | OUTPATIENT
Start: 2024-11-18 | End: 2024-11-18

## 2024-11-18 RX ADMIN — IOPAMIDOL 75 ML: 755 INJECTION, SOLUTION INTRAVENOUS at 14:43
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
## 2024-11-20 ENCOUNTER — TELEPHONE (OUTPATIENT)
Dept: SURGERY | Age: 49
End: 2024-11-20

## 2024-11-20 LAB
MICROORGANISM SPEC CULT: ABNORMAL
MICROORGANISM/AGENT SPEC: ABNORMAL
SPECIMEN DESCRIPTION: ABNORMAL
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
## 2024-11-27 LAB
MICROORGANISM SPEC CULT: ABNORMAL
MICROORGANISM/AGENT SPEC: ABNORMAL
SEND OUT REPORT: NORMAL
SPECIMEN DESCRIPTION: ABNORMAL
TEST NAME: NORMAL

## 2024-12-06 ENCOUNTER — HOSPITAL ENCOUNTER (OUTPATIENT)
Age: 49
Discharge: HOME | End: 2024-12-06
Payer: MEDICARE

## 2024-12-06 ENCOUNTER — HOSPITAL ENCOUNTER (OUTPATIENT)
Dept: GENERAL RADIOLOGY | Age: 49
Discharge: HOME | End: 2024-12-08
Payer: MEDICARE

## 2024-12-06 VITALS
DIASTOLIC BLOOD PRESSURE: 72 MMHG | HEART RATE: 120 BPM | SYSTOLIC BLOOD PRESSURE: 114 MMHG | RESPIRATION RATE: 18 BRPM | OXYGEN SATURATION: 94 %

## 2024-12-06 DIAGNOSIS — B45.8: ICD-10-CM

## 2024-12-06 DIAGNOSIS — H32: ICD-10-CM

## 2024-12-06 LAB
APPEARANCE CSF: CLEAR
CLOT CHECK: NORMAL
COLOR CSF: COLORLESS
CRYPTOC AG CSF QL: NEGATIVE
GLUCOSE CSF-MCNC: 76 MG/DL (ref 40–70)
NUC CELL # FLD MANUAL: <3 CELLS/UL (ref 0–5)
PROT CSF-MCNC: 65.7 MG/DL (ref 15–40)
RBC # FLD MANUAL: <2000 CELLS/UL
SEND OUT REPORT: NORMAL
SPECIMEN VOL CSF: NORMAL ML
TEST NAME: NORMAL
TUBE # CSF: 3

## 2024-12-06 PROCEDURE — 86406 PARTICLE AGGLUT ANTBDY TITR: CPT

## 2024-12-06 PROCEDURE — 87015 SPECIMEN INFECT AGNT CONCNTJ: CPT

## 2024-12-06 PROCEDURE — 87102 FUNGUS ISOLATION CULTURE: CPT

## 2024-12-06 PROCEDURE — 82945 GLUCOSE OTHER FLUID: CPT

## 2024-12-06 PROCEDURE — 84157 ASSAY OF PROTEIN OTHER: CPT

## 2024-12-06 PROCEDURE — 7100000011 HC PHASE II RECOVERY - ADDTL 15 MIN

## 2024-12-06 PROCEDURE — 87205 SMEAR GRAM STAIN: CPT

## 2024-12-06 PROCEDURE — 62328 DX LMBR SPI PNXR W/FLUOR/CT: CPT

## 2024-12-06 PROCEDURE — 7100000010 HC PHASE II RECOVERY - FIRST 15 MIN

## 2024-12-06 PROCEDURE — 86403 PARTICLE AGGLUT ANTBDY SCRN: CPT

## 2024-12-06 PROCEDURE — 36415 COLL VENOUS BLD VENIPUNCTURE: CPT

## 2024-12-06 PROCEDURE — 6360000002 HC RX W HCPCS: Performed by: RADIOLOGY

## 2024-12-06 PROCEDURE — 87070 CULTURE OTHR SPECIMN AEROBIC: CPT

## 2024-12-06 RX ORDER — LIDOCAINE HYDROCHLORIDE 10 MG/ML
INJECTION, SOLUTION INFILTRATION; PERINEURAL PRN
Status: COMPLETED | OUTPATIENT
Start: 2024-12-06 | End: 2024-12-06

## 2024-12-06 RX ORDER — WARFARIN SODIUM 5 MG/1
5 TABLET ORAL DAILY
COMMUNITY

## 2024-12-06 RX ORDER — ACETAMINOPHEN 325 MG/1
650 TABLET ORAL EVERY 4 HOURS PRN
Status: DISCONTINUED | OUTPATIENT
Start: 2024-12-06 | End: 2024-12-09 | Stop reason: HOSPADM

## 2024-12-06 RX ADMIN — LIDOCAINE HYDROCHLORIDE 6 ML: 10 INJECTION, SOLUTION INFILTRATION; PERINEURAL at 09:40

## 2024-12-06 NOTE — DISCHARGE INSTRUCTIONS
Lumbar Puncture   (Cerebrospinal Fluid Analysis; Cerebrospinal Fluid Tap; Spinal Tap)       Definition   In this procedure, the doctor samples the cerebrospinal fluid (CSF) from your lower back. CSF is the fluid the brain and spinal cord sit in. It provides protection and nutrition to the brain and nerve cells. This fluid also helps to remove waste products from the brain.     Lumbar Puncture Method        2011 [employer]   Reasons for Procedure   A lumbar puncture may be done when the following conditions are suspected:   Brain infection, or infection of the layers around the brain   Multiple sclerosis (MS)   Any disorder affecting the nervous system   Certain types of cancer   Bleeding in the brain or spinal cord   Excess CSF in the brain   The procedure may also be done to:   Administer dye for imaging studies   Drain CSF to lower pressure within the brain   Administer medicines (eg, chemotherapy , antibiotics, anesthesia )   Possible Complications   If you are planning to have a lumbar puncture, your doctor will review a list of possible complications, which may include:   Headache   Backache   Bleeding   Pain or abnormal burning, pricking, or tingling sensations in legs   Allergic reaction to anesthetic   Infection   What to Expect   Prior to Procedure    Prior to the procedure, your doctor will likely do the following:   CT scan of the head a type of x-ray that uses a computer to make pictures   Clean the site where the needle will be inserted   Anesthesia    Local anesthesiajust a small area is numbed; given as an injection   Description of Procedure    You will lie on your side with your knees drawn up to your abdomen. Sometimes, the procedure is done while you sit on the edge of the bed. A needle will be inserted into the spinal canal through the lower back. The doctor will take a sample of CSF through the needle. The pressure of the CSF will be measured. If you have discomfort, the needle

## 2024-12-06 NOTE — OR NURSING
Patient arrival from home to radiology for lumbar puncture. Vitals taken, consent signed, and Dr. Vargas in to speak with the patient about the procedure. Patient placed prone on Fluoroscopy table, patient prepped and draped. Using fluoroscopy imaging, needle placed to lower back by Dr. Vargsa @ 0940. 9  ML clear, colorless CSF taken and sent to lab. Needle removed, site cleansed, and band aid applied to site. Patient placed on cart and taken to recovery. Report called to stage II recovery.

## 2024-12-06 NOTE — PROGRESS NOTES
Patient and caretaker stated upon arrival to recovery that they cannot stay for the 4hour recovery because they were told they would only be here 1 hour. Will evaluate in 1 hour- 11:15.

## 2024-12-08 LAB
MICROORGANISM SPEC CULT: NORMAL
MICROORGANISM/AGENT SPEC: NORMAL
SPECIMEN DESCRIPTION: NORMAL

## 2024-12-11 LAB
MICROORGANISM SPEC CULT: NO GROWTH
MICROORGANISM/AGENT SPEC: NORMAL
SPECIMEN DESCRIPTION: NORMAL

## 2024-12-12 DIAGNOSIS — S32.811D MULTIPLE CLOSED FRACTURES OF PELVIS WITH UNSTABLE DISRUPTION OF PELVIC RING WITH ROUTINE HEALING, SUBSEQUENT ENCOUNTER: Primary | ICD-10-CM

## 2024-12-12 DIAGNOSIS — S82.142D CLOSED FRACTURE OF LEFT TIBIAL PLATEAU WITH ROUTINE HEALING, SUBSEQUENT ENCOUNTER: ICD-10-CM

## 2024-12-12 LAB
MICROORGANISM SPEC CULT: NORMAL
MICROORGANISM/AGENT SPEC: NORMAL
SEND OUT REPORT: NORMAL
SPECIMEN DESCRIPTION: NORMAL
TEST NAME: NORMAL

## 2024-12-13 ENCOUNTER — HOSPITAL ENCOUNTER (OUTPATIENT)
Dept: GENERAL RADIOLOGY | Age: 49
Discharge: HOME | End: 2024-12-15
Payer: MEDICARE

## 2024-12-13 ENCOUNTER — HOSPITAL ENCOUNTER (OUTPATIENT)
Dept: CT IMAGING | Age: 49
Discharge: HOME | End: 2024-12-15
Payer: MEDICARE

## 2024-12-13 ENCOUNTER — OFFICE VISIT (OUTPATIENT)
Dept: ORTHOPEDIC SURGERY | Age: 49
End: 2024-12-13
Payer: MEDICARE

## 2024-12-13 VITALS — DIASTOLIC BLOOD PRESSURE: 76 MMHG | HEART RATE: 107 BPM | SYSTOLIC BLOOD PRESSURE: 119 MMHG | OXYGEN SATURATION: 97 %

## 2024-12-13 DIAGNOSIS — S32.811D MULTIPLE CLOSED FRACTURES OF PELVIS WITH UNSTABLE DISRUPTION OF PELVIC RING WITH ROUTINE HEALING, SUBSEQUENT ENCOUNTER: ICD-10-CM

## 2024-12-13 DIAGNOSIS — S82.142D CLOSED FRACTURE OF LEFT TIBIAL PLATEAU WITH ROUTINE HEALING, SUBSEQUENT ENCOUNTER: ICD-10-CM

## 2024-12-13 DIAGNOSIS — S32.810D CLOSED PELVIC RING FRACTURE WITH ROUTINE HEALING, SUBSEQUENT ENCOUNTER: Primary | ICD-10-CM

## 2024-12-13 DIAGNOSIS — S32.810D CLOSED PELVIC RING FRACTURE WITH ROUTINE HEALING, SUBSEQUENT ENCOUNTER: ICD-10-CM

## 2024-12-13 LAB
ALBUMIN: 3.9 G/DL (ref 3.5–5.2)
ALP BLD-CCNC: 422 U/L (ref 40–129)
ALT SERPL-CCNC: 62 U/L (ref 0–40)
ANION GAP SERPL CALCULATED.3IONS-SCNC: 14 MMOL/L (ref 7–16)
AST SERPL-CCNC: 34 U/L (ref 0–39)
BILIRUB SERPL-MCNC: 0.3 MG/DL (ref 0–1.2)
BUN BLDV-MCNC: 25 MG/DL (ref 6–20)
CALCIUM SERPL-MCNC: 10.2 MG/DL (ref 8.6–10.2)
CHLORIDE BLD-SCNC: 92 MMOL/L (ref 98–107)
CO2: 23 MMOL/L (ref 22–29)
CREAT SERPL-MCNC: 0.7 MG/DL (ref 0.7–1.2)
GFR, ESTIMATED: >90 ML/MIN/1.73M2
GLUCOSE BLD-MCNC: 95 MG/DL (ref 74–99)
INR BLD: NORMAL
POTASSIUM SERPL-SCNC: 4.9 MMOL/L (ref 3.5–5)
PROTHROMBIN TIME: NORMAL SEC (ref 9.1–12.3)
SODIUM BLD-SCNC: 129 MMOL/L (ref 132–146)
TOTAL PROTEIN: 8.2 G/DL (ref 6.4–8.3)

## 2024-12-13 PROCEDURE — 99213 OFFICE O/P EST LOW 20 MIN: CPT | Performed by: ORTHOPAEDIC SURGERY

## 2024-12-13 PROCEDURE — 73560 X-RAY EXAM OF KNEE 1 OR 2: CPT

## 2024-12-13 PROCEDURE — 73562 X-RAY EXAM OF KNEE 3: CPT

## 2024-12-13 PROCEDURE — 73552 X-RAY EXAM OF FEMUR 2/>: CPT

## 2024-12-13 PROCEDURE — 72192 CT PELVIS W/O DYE: CPT

## 2024-12-13 PROCEDURE — 72190 X-RAY EXAM OF PELVIS: CPT

## 2024-12-13 RX ORDER — CHLORHEXIDINE GLUCONATE ORAL RINSE 1.2 MG/ML
15 SOLUTION DENTAL 2 TIMES DAILY
COMMUNITY

## 2024-12-13 RX ORDER — ACETAMINOPHEN 325 MG/10.15ML
LIQUID ORAL
COMMUNITY

## 2024-12-13 RX ORDER — DOCUSATE SODIUM 100 MG/1
100 CAPSULE, LIQUID FILLED ORAL 2 TIMES DAILY
COMMUNITY

## 2024-12-13 RX ORDER — MUPIROCIN 20 MG/G
OINTMENT TOPICAL 3 TIMES DAILY
COMMUNITY

## 2024-12-13 RX ORDER — FLUCONAZOLE 200 MG/1
200 TABLET ORAL DAILY
COMMUNITY

## 2024-12-13 RX ORDER — DULOXETIN HYDROCHLORIDE 20 MG/1
20 CAPSULE, DELAYED RELEASE ORAL DAILY
COMMUNITY

## 2024-12-13 RX ORDER — METOPROLOL TARTRATE 25 MG/1
25 TABLET, FILM COATED ORAL 2 TIMES DAILY
COMMUNITY

## 2024-12-13 RX ORDER — OXYCODONE HYDROCHLORIDE 5 MG/1
10 TABLET ORAL EVERY 4 HOURS PRN
COMMUNITY

## 2024-12-13 RX ORDER — HYDROXYZINE HYDROCHLORIDE 10 MG/1
10 TABLET, FILM COATED ORAL 3 TIMES DAILY PRN
COMMUNITY

## 2024-12-13 RX ORDER — PANTOPRAZOLE SODIUM 40 MG/1
40 FOR SUSPENSION ORAL
COMMUNITY

## 2024-12-13 RX ORDER — LORAZEPAM 0.5 MG/1
0.5 TABLET ORAL EVERY 6 HOURS PRN
COMMUNITY

## 2024-12-13 RX ORDER — ATORVASTATIN CALCIUM 20 MG/1
20 TABLET, FILM COATED ORAL DAILY
COMMUNITY

## 2024-12-13 RX ORDER — CYCLOBENZAPRINE HCL 10 MG
10 TABLET ORAL 3 TIMES DAILY PRN
COMMUNITY

## 2024-12-13 RX ORDER — ERGOCALCIFEROL 1.25 MG/1
50000 CAPSULE, LIQUID FILLED ORAL WEEKLY
COMMUNITY
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
## 2024-12-13 NOTE — PATIENT INSTRUCTIONS
Non weight bearing bilateral lower extremities  CT scan pelvis for surgical planning prior to open reuction internal fixation of pelvic ring with revision of total hip arthroplasty  Continue pain and dvt prophylaxes regimen  Follow up in a week after obtaining CT scan for surgical scheduling

## 2024-12-18 ENCOUNTER — TELEPHONE (OUTPATIENT)
Dept: ORTHOPEDIC SURGERY | Age: 49
End: 2024-12-18

## 2024-12-18 DIAGNOSIS — S32.811D MULTIPLE CLOSED FRACTURES OF PELVIS WITH UNSTABLE DISRUPTION OF PELVIC RING WITH ROUTINE HEALING, SUBSEQUENT ENCOUNTER: Primary | ICD-10-CM

## 2024-12-18 DIAGNOSIS — S82.132D CLOSED FRACTURE OF MEDIAL PORTION OF LEFT TIBIAL PLATEAU WITH ROUTINE HEALING, SUBSEQUENT ENCOUNTER: ICD-10-CM

## 2024-12-18 DIAGNOSIS — S72.115D CLOSED NONDISPLACED FRACTURE OF GREATER TROCHANTER OF LEFT FEMUR WITH ROUTINE HEALING, SUBSEQUENT ENCOUNTER: ICD-10-CM

## 2024-12-18 NOTE — TELEPHONE ENCOUNTER
Zhane from [facility] called and asked if his upcoming appointment was still necessary? She also wanted to know for the OT if he had any weight bearing restrictions with his R wrist and about progressive PRE.     Last OV: 12/13/2024 - closed pelvic ring fracture with routine healing    Surgery: 10/30/2024 - pelvis ORIF / Revision of R Hip Arthroplasty  11/3/2024 - Left Tibial Plateau ORIF    Sent to provider for further recommendations

## 2024-12-18 NOTE — TELEPHONE ENCOUNTER
Yes, he needs seen again. He needs updated R wrist XR along with his other XR at next OV. He should remain NWB through the wrist until we evaluate him at next OV    Future Appointments   Date Time Provider Department Center   12/20/2024  9:00 AM SCHEDULE, SE ORTHO RES SE [facility]

## 2024-12-20 ENCOUNTER — OFFICE VISIT (OUTPATIENT)
Dept: ORTHOPEDIC SURGERY | Age: 49
End: 2024-12-20
Payer: MEDICARE

## 2024-12-20 ENCOUNTER — HOSPITAL ENCOUNTER (OUTPATIENT)
Dept: GENERAL RADIOLOGY | Age: 49
Discharge: HOME | End: 2024-12-22
Payer: MEDICARE

## 2024-12-20 VITALS
WEIGHT: 285 LBS | RESPIRATION RATE: 22 BRPM | DIASTOLIC BLOOD PRESSURE: 73 MMHG | HEIGHT: 73 IN | HEART RATE: 104 BPM | BODY MASS INDEX: 37.77 KG/M2 | OXYGEN SATURATION: 93 % | SYSTOLIC BLOOD PRESSURE: 129 MMHG | TEMPERATURE: 98.1 F

## 2024-12-20 DIAGNOSIS — S52.551D OTHER CLOSED EXTRA-ARTICULAR FRACTURE OF DISTAL END OF RIGHT RADIUS WITH ROUTINE HEALING, SUBSEQUENT ENCOUNTER: ICD-10-CM

## 2024-12-20 DIAGNOSIS — S52.551D OTHER CLOSED EXTRA-ARTICULAR FRACTURE OF DISTAL END OF RIGHT RADIUS WITH ROUTINE HEALING, SUBSEQUENT ENCOUNTER: Primary | ICD-10-CM

## 2024-12-20 PROCEDURE — 99212 OFFICE O/P EST SF 10 MIN: CPT | Performed by: STUDENT IN AN ORGANIZED HEALTH CARE EDUCATION/TRAINING PROGRAM

## 2024-12-20 PROCEDURE — 99024 POSTOP FOLLOW-UP VISIT: CPT | Performed by: STUDENT IN AN ORGANIZED HEALTH CARE EDUCATION/TRAINING PROGRAM

## 2024-12-20 PROCEDURE — 73110 X-RAY EXAM OF WRIST: CPT

## 2024-12-20 RX ORDER — CHOLESTYRAMINE 4 G/9G
1 POWDER, FOR SUSPENSION ORAL WEEKLY
COMMUNITY

## 2024-12-20 NOTE — PROGRESS NOTES
Chief Complaint   Patient presents with    Follow-up      Left tibial plateau ORIF 11/3/24, Right Tab ORIF 10/30/24, Left periprosthetic posterior wall posterior column fracture, Left greater trochanteric fracture; JVG (*GP 2/1/25). Patient states pain lvl 6         SUBJECTIVE: Patient seen and examined today.  No new complaints.  He is here for follow-up of CT scan of the pelvis as well as for imaging of the right wrist.  Denies any new complaints today.      Review of Systems -   General ROS: negative for - chills, fatigue, fever or night sweats  Respiratory ROS: no cough, shortness of breath, or wheezing  Cardiovascular ROS: no chest pain or dyspnea on exertion  Gastrointestinal ROS: no abdominal pain, change in bowel habits, or black or bloody stools  Genitourinary: no hematuria, dysuria, or incontinence   Musculoskeletal ROS:see above  Neurological ROS: no TIA or stroke symptoms       OBJECTIVE:   Alert and oriented X 3, no acute distress, respirations easy and unlabored with no audible wheezes, skin warm and dry, speech and dress appropriate for noted age, affect euthymic.    Extremity:  Right upper extremity:  Skin intact circumferentially  Velcro wrist brace in place  Compartments soft and compressible  +AIN/PIN/Ulnar nerve function intact grossly  +Radial pulse, Brisk Cap refill, hand warm and perfused  Sensation intact to touch in radial/ulnar/median nerve distributions to hand    Bilateral lower extremity:  Incision of left lower extremity appear to be experiencing routine healing with no signs of infection or purulent drainage.  Positive tenderness to palpation about the bilateral hips.  Left worse than right  Compartments soft and compressible  Motor function of bilateral ankles nonfunctioning.  Global blunting of sensation to bilateral feet  +2/4 DP & PT pulses, Brisk Cap refill, Toes warm and perfused    XR: 12/20/24   X-ray right wrist: 3 views of the right wrist obtained today in clinic

## 2024-12-20 NOTE — PATIENT INSTRUCTIONS
Nonweightbearing bilateral lower extremities  Nonweightbearing right upper extremity    Okay for light range of motion activities to the right upper extremity    Continue range of motion activities to the bilateral lower extremities    Recommend calcium and vitamin D supplementation    Multimodal pain control over-the-counter    Continue to wear a Velcro wrist brace.  Okay to remove Velcro wrist brace for hygienic activities in the light wrist range of motion activities    Follow-up in 4 weeks    Future Appointments   Date Time Provider Department Center   1/23/2025  9:45 AM Yusuf Werner,   [facility]

## 2024-12-22 LAB
MICROORGANISM SPEC CULT: NORMAL
MICROORGANISM/AGENT SPEC: NORMAL
SPECIMEN DESCRIPTION: NORMAL

## 2024-12-27 LAB
ALBUMIN: NORMAL G/DL (ref 3.5–5.2)
ALP BLD-CCNC: NORMAL U/L (ref 40–129)
ALT SERPL-CCNC: NORMAL U/L (ref 5–41)
ANION GAP SERPL CALCULATED.3IONS-SCNC: NORMAL MMOL/L (ref 9–17)
AST SERPL-CCNC: NORMAL U/L
BILIRUB SERPL-MCNC: NORMAL MG/DL (ref 0.3–1.2)
BUN BLDV-MCNC: NORMAL MG/DL (ref 6–20)
CALCIUM SERPL-MCNC: NORMAL MG/DL (ref 8.6–10.4)
CHLORIDE BLD-SCNC: NORMAL MMOL/L (ref 98–107)
CO2: NORMAL MMOL/L (ref 20–31)
CREAT SERPL-MCNC: NORMAL MG/DL (ref 0.7–1.2)
GFR, ESTIMATED: NORMAL ML/MIN/1.73M2
GLUCOSE BLD-MCNC: NORMAL MG/DL (ref 70–99)
POTASSIUM SERPL-SCNC: NORMAL MMOL/L (ref 3.7–5.3)
SODIUM BLD-SCNC: NORMAL MMOL/L (ref 135–144)
TOTAL PROTEIN: NORMAL G/DL (ref 6.4–8.3)

## 2024-12-29 LAB
MICROORGANISM SPEC CULT: NORMAL
MICROORGANISM/AGENT SPEC: NORMAL
SPECIMEN DESCRIPTION: NORMAL

## 2025-01-05 LAB
MICROORGANISM SPEC CULT: NORMAL
MICROORGANISM/AGENT SPEC: NORMAL
SPECIMEN DESCRIPTION: NORMAL

## 2025-01-07 ENCOUNTER — TELEPHONE (OUTPATIENT)
Dept: ORTHOPEDIC SURGERY | Age: 50
End: 2025-01-07

## 2025-01-07 DIAGNOSIS — S52.551D OTHER CLOSED EXTRA-ARTICULAR FRACTURE OF DISTAL END OF RIGHT RADIUS WITH ROUTINE HEALING, SUBSEQUENT ENCOUNTER: Primary | ICD-10-CM

## 2025-01-07 LAB
MICROORGANISM SPEC CULT: NORMAL
MICROORGANISM/AGENT SPEC: NORMAL
SPECIMEN DESCRIPTION: NORMAL

## 2025-01-07 NOTE — TELEPHONE ENCOUNTER
Jm from [facility] called. Pt arrived at [facility] facility [facility]. Pt did not have a splint to his R wrist, but was supposed to. Pt does not know what happened to the splint. If he still needs one, they are requesting that a script for wrist splint be sent to the facility.      Script printed by Eric Valdovinos PA-C . Faxed to [facility]

## 2025-01-08 ENCOUNTER — OUTSIDE SERVICES (OUTPATIENT)
Dept: PRIMARY CARE CLINIC | Age: 50
End: 2025-01-08

## 2025-01-08 DIAGNOSIS — J96.01 ACUTE RESPIRATORY FAILURE WITH HYPOXIA: ICD-10-CM

## 2025-01-08 DIAGNOSIS — B45.0 PULMONARY CRYPTOCOCCOSIS (HCC): ICD-10-CM

## 2025-01-08 DIAGNOSIS — M62.81 MUSCLE WEAKNESS (GENERALIZED): ICD-10-CM

## 2025-01-08 DIAGNOSIS — E11.65 TYPE 2 DIABETES MELLITUS WITH HYPERGLYCEMIA, WITHOUT LONG-TERM CURRENT USE OF INSULIN (HCC): ICD-10-CM

## 2025-01-08 DIAGNOSIS — Z86.711 PERSONAL HISTORY OF PULMONARY EMBOLISM: ICD-10-CM

## 2025-01-08 DIAGNOSIS — I10 ESSENTIAL (PRIMARY) HYPERTENSION: ICD-10-CM

## 2025-01-08 DIAGNOSIS — S32.810D MULTIPLE FRACTURES OF PELVIS WITH STABLE DISRUPTION OF PELVIC RING, SUBSEQUENT ENCOUNTER FOR FRACTURE WITH ROUTINE HEALING: Primary | ICD-10-CM

## 2025-01-08 DIAGNOSIS — K21.9 GASTRO-ESOPHAGEAL REFLUX DISEASE WITHOUT ESOPHAGITIS: ICD-10-CM

## 2025-01-08 DIAGNOSIS — I46.2 CARDIAC ARREST DUE TO UNDERLYING CARDIAC CONDITION: ICD-10-CM

## 2025-01-13 ASSESSMENT — ENCOUNTER SYMPTOMS
NAUSEA: 0
BLOOD IN STOOL: 0
SINUS PRESSURE: 0
ABDOMINAL PAIN: 0
VOMITING: 0
DIARRHEA: 0
CONSTIPATION: 0
BACK PAIN: 0
FACIAL SWELLING: 0
SINUS PAIN: 0
COUGH: 0
TROUBLE SWALLOWING: 0
SHORTNESS OF BREATH: 0
SORE THROAT: 0
EYE DISCHARGE: 0
WHEEZING: 0
COLOR CHANGE: 0
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
## 2025-01-16 ENCOUNTER — OUTSIDE SERVICES (OUTPATIENT)
Dept: PRIMARY CARE CLINIC | Age: 50
End: 2025-01-16

## 2025-01-16 DIAGNOSIS — I46.2 CARDIAC ARREST DUE TO UNDERLYING CARDIAC CONDITION: ICD-10-CM

## 2025-01-16 DIAGNOSIS — I10 ESSENTIAL (PRIMARY) HYPERTENSION: ICD-10-CM

## 2025-01-16 DIAGNOSIS — B45.0 PULMONARY CRYPTOCOCCOSIS (HCC): ICD-10-CM

## 2025-01-16 DIAGNOSIS — J96.01 ACUTE RESPIRATORY FAILURE WITH HYPOXIA: ICD-10-CM

## 2025-01-16 DIAGNOSIS — K21.9 GASTRO-ESOPHAGEAL REFLUX DISEASE WITHOUT ESOPHAGITIS: ICD-10-CM

## 2025-01-16 DIAGNOSIS — S32.810D MULTIPLE FRACTURES OF PELVIS WITH STABLE DISRUPTION OF PELVIC RING, SUBSEQUENT ENCOUNTER FOR FRACTURE WITH ROUTINE HEALING: Primary | ICD-10-CM

## 2025-01-16 DIAGNOSIS — M62.81 MUSCLE WEAKNESS (GENERALIZED): ICD-10-CM

## 2025-01-16 DIAGNOSIS — Z86.711 PERSONAL HISTORY OF PULMONARY EMBOLISM: ICD-10-CM

## 2025-01-16 DIAGNOSIS — E11.65 TYPE 2 DIABETES MELLITUS WITH HYPERGLYCEMIA, WITHOUT LONG-TERM CURRENT USE OF INSULIN (HCC): ICD-10-CM

## 2025-01-16 DIAGNOSIS — I10 ESSENTIAL HYPERTENSION: ICD-10-CM

## 2025-01-17 DIAGNOSIS — T14.8XXA FRACTURE: ICD-10-CM

## 2025-01-17 DIAGNOSIS — S32.811D MULTIPLE CLOSED FRACTURES OF PELVIS WITH UNSTABLE DISRUPTION OF PELVIC RING WITH ROUTINE HEALING, SUBSEQUENT ENCOUNTER: Primary | ICD-10-CM

## 2025-01-20 ASSESSMENT — ENCOUNTER SYMPTOMS
COUGH: 0
TROUBLE SWALLOWING: 0
WHEEZING: 0
FACIAL SWELLING: 0
SORE THROAT: 0
SINUS PRESSURE: 0
COLOR CHANGE: 0
ABDOMINAL PAIN: 0
BACK PAIN: 0
VOMITING: 0
SHORTNESS OF BREATH: 0
BLOOD IN STOOL: 0
NAUSEA: 0
EYE DISCHARGE: 0
DIARRHEA: 0
SINUS PAIN: 0
CONSTIPATION: 0

## 2025-01-20 NOTE — PROGRESS NOTES
PLATEAU OPEN REDUCTION INTERNAL FIXATION performed by Osmel Huang DO at [facility] [place]    NECK SURGERY  2000    FUSION    PELVIC FRACTURE SURGERY Right 10/30/2024    PELVIS OPEN REDUCTION INTERNAL FIXATION ANTERIOR/ REVISION OF RIGHT HIP ARTHROPLASTY performed by Osmel Huang DO at [facility] [place]    MA COLONOSCOPY FLX DX W/COLLJ SPEC WHEN PFRMD N/A 05/07/2018    COLONOSCOPY DIAGNOSTIC performed by Uriel Thorpe MD at [facility] ENDOSCOPY    ROTATOR CUFF REPAIR Left 2014    Dr. Waggoner    SHOULDER ARTHROSCOPY Left 11/21/2014    SHOULDER SURGERY  2001 &2007    RIGHT AND LEFT repair of tears    SPLENECTOMY, TOTAL      THROMBECTOMY / EMBOLECTOMY FEMORAL Left 01/01/2021    LEFT LOWER EXTREMITY, VENOGRAM, FOLLOW UP POSSIBLE REMOVAL LYSIS CATHETER performed by Bobo Lopez MD at [facility] [place]    THROMBECTOMY / EMBOLECTOMY FEMORAL Left 01/02/2021    LEFT LOWER EXTREMITY VENOGRAM performed by Bobo Lopez MD at [facility] [place]    TOTAL HIP ARTHROPLASTY Right 10/31/2016    Total right hip  J. MD Sal    TRACHEAL SURGERY N/A 11/8/2024    TRACHEOTOMY PERCUTANEOUS BRONCHOSCOPY/bedside performed by Rafael Fuller MD at [facility] [place]    UPPER GASTROINTESTINAL ENDOSCOPY N/A 09/04/2020    EGD DIAGNOSTIC ONLY performed by Ascencion Quiros MD at [facility] [place]    UPPER GASTROINTESTINAL ENDOSCOPY N/A 11/8/2024    ESOPHAGOGASTRODUODENOSCOPY PERCUTANEOUS ENDOSCOPIC GASTROSTOMY TUBE INSERTION performed by Rafael Fuller MD at [facility] [place]    VASECTOMY      VENA CAVA FILTER PLACEMENT Left 01/03/2021    LEFT LOWER EXTREMITY VENOGRAM, PLACEMENT OF NEW LYSIS CATHETER performed by Bobo Lopez MD at [facility] [place]    VENTRAL HERNIA REPAIR N/A 05/12/2022    OPEN INCISIONAL HERNIA REPAIR WITH MESH performed by Otilio Soares MD at [facility] [place]    WRIST SURGERY  2007    LEFT WRIST        FH: reviewed and updated       Problem Relation Age of Onset    Hypertension Mother     Arthritis Father     Diabetes Father     Cancer Father

## 2025-01-22 ASSESSMENT — ENCOUNTER SYMPTOMS
SHORTNESS OF BREATH: 0
VOMITING: 0
SINUS PRESSURE: 0
EYE DISCHARGE: 0
NAUSEA: 0
FACIAL SWELLING: 0
BLOOD IN STOOL: 0
TROUBLE SWALLOWING: 0
SINUS PAIN: 0
COLOR CHANGE: 0
COUGH: 0
BACK PAIN: 0
CONSTIPATION: 0
WHEEZING: 0
SORE THROAT: 0
DIARRHEA: 0
ABDOMINAL PAIN: 0
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
## 2025-01-23 ENCOUNTER — HOSPITAL ENCOUNTER (OUTPATIENT)
Dept: GENERAL RADIOLOGY | Age: 50
Discharge: HOME | End: 2025-01-25
Payer: MEDICARE

## 2025-01-23 ENCOUNTER — OUTSIDE SERVICES (OUTPATIENT)
Dept: PRIMARY CARE CLINIC | Age: 50
End: 2025-01-23

## 2025-01-23 ENCOUNTER — OFFICE VISIT (OUTPATIENT)
Dept: ORTHOPEDIC SURGERY | Age: 50
End: 2025-01-23
Payer: MEDICARE

## 2025-01-23 VITALS
HEART RATE: 87 BPM | SYSTOLIC BLOOD PRESSURE: 143 MMHG | DIASTOLIC BLOOD PRESSURE: 88 MMHG | TEMPERATURE: 98.5 F | OXYGEN SATURATION: 95 % | RESPIRATION RATE: 20 BRPM

## 2025-01-23 DIAGNOSIS — I46.2 CARDIAC ARREST DUE TO UNDERLYING CARDIAC CONDITION: ICD-10-CM

## 2025-01-23 DIAGNOSIS — S32.811D MULTIPLE CLOSED FRACTURES OF PELVIS WITH UNSTABLE DISRUPTION OF PELVIC RING WITH ROUTINE HEALING, SUBSEQUENT ENCOUNTER: ICD-10-CM

## 2025-01-23 DIAGNOSIS — M62.81 MUSCLE WEAKNESS (GENERALIZED): ICD-10-CM

## 2025-01-23 DIAGNOSIS — B45.0 PULMONARY CRYPTOCOCCOSIS (HCC): ICD-10-CM

## 2025-01-23 DIAGNOSIS — R20.0 NUMBNESS AND TINGLING IN RIGHT HAND: ICD-10-CM

## 2025-01-23 DIAGNOSIS — S52.551D OTHER CLOSED EXTRA-ARTICULAR FRACTURE OF DISTAL END OF RIGHT RADIUS WITH ROUTINE HEALING, SUBSEQUENT ENCOUNTER: Primary | ICD-10-CM

## 2025-01-23 DIAGNOSIS — S32.810D MULTIPLE FRACTURES OF PELVIS WITH STABLE DISRUPTION OF PELVIC RING, SUBSEQUENT ENCOUNTER FOR FRACTURE WITH ROUTINE HEALING: Primary | ICD-10-CM

## 2025-01-23 DIAGNOSIS — I10 ESSENTIAL (PRIMARY) HYPERTENSION: ICD-10-CM

## 2025-01-23 DIAGNOSIS — I10 ESSENTIAL HYPERTENSION: ICD-10-CM

## 2025-01-23 DIAGNOSIS — T14.8XXA FRACTURE: ICD-10-CM

## 2025-01-23 DIAGNOSIS — R20.2 NUMBNESS AND TINGLING IN RIGHT HAND: ICD-10-CM

## 2025-01-23 DIAGNOSIS — K21.9 GASTRO-ESOPHAGEAL REFLUX DISEASE WITHOUT ESOPHAGITIS: ICD-10-CM

## 2025-01-23 DIAGNOSIS — E11.65 TYPE 2 DIABETES MELLITUS WITH HYPERGLYCEMIA, WITHOUT LONG-TERM CURRENT USE OF INSULIN (HCC): ICD-10-CM

## 2025-01-23 DIAGNOSIS — Z86.711 PERSONAL HISTORY OF PULMONARY EMBOLISM: ICD-10-CM

## 2025-01-23 DIAGNOSIS — J96.01 ACUTE RESPIRATORY FAILURE WITH HYPOXIA: ICD-10-CM

## 2025-01-23 PROCEDURE — 73110 X-RAY EXAM OF WRIST: CPT

## 2025-01-23 PROCEDURE — 73552 X-RAY EXAM OF FEMUR 2/>: CPT

## 2025-01-23 PROCEDURE — 73560 X-RAY EXAM OF KNEE 1 OR 2: CPT

## 2025-01-23 PROCEDURE — 99024 POSTOP FOLLOW-UP VISIT: CPT | Performed by: PHYSICIAN ASSISTANT

## 2025-01-23 PROCEDURE — 72190 X-RAY EXAM OF PELVIS: CPT

## 2025-01-23 PROCEDURE — 99212 OFFICE O/P EST SF 10 MIN: CPT | Performed by: PHYSICIAN ASSISTANT

## 2025-01-23 RX ORDER — BACITRACIN ZINC 500 [USP'U]/G
1 OINTMENT TOPICAL DAILY
COMMUNITY
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
## 2025-01-23 NOTE — PATIENT INSTRUCTIONS
You were referred to Dr. Zhao for further evaluation of your left hip.    You were ordered an EMG by your Orthopedic Provider.  Please contact [phone] to schedule    Please make sure your follow up appointment in office is scheduled shortly after the above testing is complete.  If your testing is completed significantly sooner than planned follow up contact office for appointment adjustment.     Advance activities as tolerated to the right wrist. Wean out of cock up wrist brace. Weightbearing as tolerated to the right upper extremity.    Encourage ROM of the left knee as tolerated. Okay to discontinue knee hinged ROM brace    Nonweightbearing to bilateral lower extremities.     Continue Lovenox for DVT prophylaxis.    Follow up in 6 weeks with repeat xrays.

## 2025-01-28 ENCOUNTER — OUTSIDE SERVICES (OUTPATIENT)
Dept: PRIMARY CARE CLINIC | Age: 50
End: 2025-01-28

## 2025-01-28 DIAGNOSIS — I46.2 CARDIAC ARREST DUE TO UNDERLYING CARDIAC CONDITION: ICD-10-CM

## 2025-01-28 DIAGNOSIS — K21.9 GASTRO-ESOPHAGEAL REFLUX DISEASE WITHOUT ESOPHAGITIS: ICD-10-CM

## 2025-01-28 DIAGNOSIS — I10 ESSENTIAL HYPERTENSION: ICD-10-CM

## 2025-01-28 DIAGNOSIS — E11.65 TYPE 2 DIABETES MELLITUS WITH HYPERGLYCEMIA, WITHOUT LONG-TERM CURRENT USE OF INSULIN (HCC): ICD-10-CM

## 2025-01-28 DIAGNOSIS — S32.810D MULTIPLE FRACTURES OF PELVIS WITH STABLE DISRUPTION OF PELVIC RING, SUBSEQUENT ENCOUNTER FOR FRACTURE WITH ROUTINE HEALING: Primary | ICD-10-CM

## 2025-01-28 DIAGNOSIS — S73.005D DISLOCATION OF LEFT HIP, SUBSEQUENT ENCOUNTER: ICD-10-CM

## 2025-01-28 DIAGNOSIS — M62.81 MUSCLE WEAKNESS (GENERALIZED): ICD-10-CM

## 2025-01-28 DIAGNOSIS — I10 ESSENTIAL (PRIMARY) HYPERTENSION: ICD-10-CM

## 2025-01-28 DIAGNOSIS — B45.0 PULMONARY CRYPTOCOCCOSIS (HCC): ICD-10-CM

## 2025-01-28 DIAGNOSIS — Z86.711 PERSONAL HISTORY OF PULMONARY EMBOLISM: ICD-10-CM

## 2025-01-28 DIAGNOSIS — J96.01 ACUTE RESPIRATORY FAILURE WITH HYPOXIA: ICD-10-CM

## 2025-01-30 ASSESSMENT — ENCOUNTER SYMPTOMS
SINUS PRESSURE: 0
TROUBLE SWALLOWING: 0
BACK PAIN: 0
SINUS PAIN: 0
CONSTIPATION: 0
COLOR CHANGE: 0
SHORTNESS OF BREATH: 0
FACIAL SWELLING: 0
NAUSEA: 0
ABDOMINAL PAIN: 0
SORE THROAT: 0
BLOOD IN STOOL: 0
DIARRHEA: 0
COUGH: 0
EYE DISCHARGE: 0
VOMITING: 0
WHEEZING: 0
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
## 2025-01-31 ENCOUNTER — PROCEDURE VISIT (OUTPATIENT)
Dept: PHYSICAL MEDICINE AND REHAB | Age: 50
End: 2025-01-31

## 2025-01-31 VITALS — HEIGHT: 73 IN | WEIGHT: 285 LBS | BODY MASS INDEX: 37.77 KG/M2

## 2025-01-31 DIAGNOSIS — R20.2 NUMBNESS AND TINGLING IN RIGHT HAND: ICD-10-CM

## 2025-01-31 DIAGNOSIS — R20.0 NUMBNESS AND TINGLING IN RIGHT HAND: ICD-10-CM
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
## 2025-01-31 NOTE — PATIENT INSTRUCTIONS
Electrodiagnotic Laboratory  Accredited [facility] the [facility] with Ex[facility]  EDI Stephenson D.O.   [facility]  [address] [place] [address]  Phone: [phone]  Fax: [phone]        Today you had an electrodiagnostic exam which included nerve conduction studies (NCS) and electromyography (EMG). This test evaluated the electrical activity of your nerves and muscles to help determine if you have a nerve or muscle disease.  This test can help determine the location and type of a nerve or muscle problem. This will help your referring doctor diagnose your condition and determine the appropriate next step in your treatment plan.     After your test:    1. There are no long lasting side effects of the test.     2. You may resume your normal activities without restrictions.     3.  Resume any medications that were stopped for the test.     4  If you have sore areas or bruising in your muscles where the needle was placed, apply a cold pack to the sore area for 15-20 minutes three to four times a day as needed for pain.  The soreness should go away in about 1-2 days.     5. Your results were provided  Briefly at the end of your test and the final detailed report will be provided to your referring physician, and/or primary care physician and any other parties you requested within 1-2 days of the examination. You may wish to contact your referring provider after a few days to determine what they would like you to do next.     6.  Please call [phone] with any questions or concerns and if you develop increased body temperature/fever, swelling, tenderness, increased pain and/or drainage from the sites where the needle was placed.     Thank you for choosing us for your health care needs.
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
## 2025-02-03 ENCOUNTER — OUTSIDE SERVICES (OUTPATIENT)
Dept: PRIMARY CARE CLINIC | Age: 50
End: 2025-02-03

## 2025-02-03 DIAGNOSIS — K21.9 GASTRO-ESOPHAGEAL REFLUX DISEASE WITHOUT ESOPHAGITIS: ICD-10-CM

## 2025-02-03 DIAGNOSIS — J96.01 ACUTE RESPIRATORY FAILURE WITH HYPOXIA: ICD-10-CM

## 2025-02-03 DIAGNOSIS — I10 ESSENTIAL (PRIMARY) HYPERTENSION: ICD-10-CM

## 2025-02-03 DIAGNOSIS — S32.810D MULTIPLE FRACTURES OF PELVIS WITH STABLE DISRUPTION OF PELVIC RING, SUBSEQUENT ENCOUNTER FOR FRACTURE WITH ROUTINE HEALING: Primary | ICD-10-CM

## 2025-02-03 DIAGNOSIS — S73.005D DISLOCATION OF LEFT HIP, SUBSEQUENT ENCOUNTER: ICD-10-CM

## 2025-02-03 DIAGNOSIS — Z86.711 PERSONAL HISTORY OF PULMONARY EMBOLISM: ICD-10-CM

## 2025-02-03 DIAGNOSIS — B45.0 PULMONARY CRYPTOCOCCOSIS (HCC): ICD-10-CM

## 2025-02-03 DIAGNOSIS — M62.81 MUSCLE WEAKNESS (GENERALIZED): ICD-10-CM

## 2025-02-03 DIAGNOSIS — I46.2 CARDIAC ARREST DUE TO UNDERLYING CARDIAC CONDITION: ICD-10-CM

## 2025-02-03 DIAGNOSIS — E11.65 TYPE 2 DIABETES MELLITUS WITH HYPERGLYCEMIA, WITHOUT LONG-TERM CURRENT USE OF INSULIN (HCC): ICD-10-CM

## 2025-02-07 ASSESSMENT — ENCOUNTER SYMPTOMS
SORE THROAT: 0
TROUBLE SWALLOWING: 0
SINUS PRESSURE: 0
SHORTNESS OF BREATH: 0
SINUS PAIN: 0
FACIAL SWELLING: 0
COLOR CHANGE: 0
COUGH: 0
VOMITING: 0
DIARRHEA: 0
BACK PAIN: 0
WHEEZING: 0
NAUSEA: 0
CONSTIPATION: 0
ABDOMINAL PAIN: 0
BLOOD IN STOOL: 0
EYE DISCHARGE: 0

## 2025-02-07 NOTE — PROGRESS NOTES
Hernia: No hernia is present.   Musculoskeletal:         General: No swelling, tenderness, deformity or signs of injury. Normal range of motion.      Right shoulder: Normal.      Left shoulder: Normal.      Right upper arm: Normal.      Left upper arm: Normal.      Right elbow: Normal.      Left elbow: Normal.      Right forearm: Normal.      Left forearm: Normal.      Right wrist: Normal.      Left wrist: Normal.      Right hand: Normal.      Left hand: Normal.      Cervical back: Normal, normal range of motion and neck supple. No rigidity or tenderness.      Thoracic back: Normal.      Lumbar back: Normal.      Right lower leg: Edema present.      Left lower leg: Edema present.      Comments: Multiple fractures   Lymphadenopathy:      Cervical: No cervical adenopathy.   Skin:     General: Skin is warm and dry.      Capillary Refill: Capillary refill takes 2 to 3 seconds.      Findings: No bruising, lesion or rash.   Neurological:      General: No focal deficit present.      Mental Status: He is alert and oriented to person, place, and time. Mental status is at baseline.      Sensory: No sensory deficit.      Motor: No weakness.      Gait: Gait normal.   Psychiatric:         Mood and Affect: Mood normal.         Behavior: Behavior normal.         Thought Content: Thought content normal.         Judgment: Judgment normal.         Assessment & Plan:  1. Multiple fractures of pelvis with stable disruption of pelvic ring, subsequent encounter for fracture with routine healing  2. Pulmonary cryptococcosis (HCC)  3. Acute respiratory failure with hypoxia  4. Type 2 diabetes mellitus with hyperglycemia, without long-term current use of insulin (HCC)  5. Essential (primary) hypertension  6. Gastro-esophageal reflux disease without esophagitis  7. Muscle weakness (generalized)  8. Personal history of pulmonary embolism  9. Cardiac arrest due to underlying cardiac condition  10. Dislocation of left hip, subsequent

## 2025-02-13 ENCOUNTER — OUTSIDE SERVICES (OUTPATIENT)
Dept: PRIMARY CARE CLINIC | Age: 50
End: 2025-02-13

## 2025-02-13 DIAGNOSIS — E11.65 TYPE 2 DIABETES MELLITUS WITH HYPERGLYCEMIA, WITHOUT LONG-TERM CURRENT USE OF INSULIN (HCC): ICD-10-CM

## 2025-02-13 DIAGNOSIS — S73.005D DISLOCATION OF LEFT HIP, SUBSEQUENT ENCOUNTER: ICD-10-CM

## 2025-02-13 DIAGNOSIS — K21.9 GASTRO-ESOPHAGEAL REFLUX DISEASE WITHOUT ESOPHAGITIS: ICD-10-CM

## 2025-02-13 DIAGNOSIS — J96.01 ACUTE RESPIRATORY FAILURE WITH HYPOXIA (HCC): ICD-10-CM

## 2025-02-13 DIAGNOSIS — M62.81 MUSCLE WEAKNESS (GENERALIZED): ICD-10-CM

## 2025-02-13 DIAGNOSIS — Z86.711 PERSONAL HISTORY OF PULMONARY EMBOLISM: ICD-10-CM

## 2025-02-13 DIAGNOSIS — I46.2 CARDIAC ARREST DUE TO UNDERLYING CARDIAC CONDITION (HCC): ICD-10-CM

## 2025-02-13 DIAGNOSIS — S32.810D MULTIPLE FRACTURES OF PELVIS WITH STABLE DISRUPTION OF PELVIC RING, SUBSEQUENT ENCOUNTER FOR FRACTURE WITH ROUTINE HEALING: Primary | ICD-10-CM

## 2025-02-13 DIAGNOSIS — B45.0 PULMONARY CRYPTOCOCCOSIS (HCC): ICD-10-CM

## 2025-02-13 DIAGNOSIS — I10 ESSENTIAL (PRIMARY) HYPERTENSION: ICD-10-CM

## 2025-02-18 ENCOUNTER — OUTSIDE SERVICES (OUTPATIENT)
Dept: PRIMARY CARE CLINIC | Age: 50
End: 2025-02-18

## 2025-02-18 DIAGNOSIS — B45.0 PULMONARY CRYPTOCOCCOSIS (HCC): ICD-10-CM

## 2025-02-18 DIAGNOSIS — E11.65 TYPE 2 DIABETES MELLITUS WITH HYPERGLYCEMIA, WITHOUT LONG-TERM CURRENT USE OF INSULIN (HCC): ICD-10-CM

## 2025-02-18 DIAGNOSIS — Z86.711 PERSONAL HISTORY OF PULMONARY EMBOLISM: ICD-10-CM

## 2025-02-18 DIAGNOSIS — I10 ESSENTIAL (PRIMARY) HYPERTENSION: ICD-10-CM

## 2025-02-18 DIAGNOSIS — S73.005D DISLOCATION OF LEFT HIP, SUBSEQUENT ENCOUNTER: ICD-10-CM

## 2025-02-18 DIAGNOSIS — M62.81 MUSCLE WEAKNESS (GENERALIZED): ICD-10-CM

## 2025-02-18 DIAGNOSIS — K21.9 GASTRO-ESOPHAGEAL REFLUX DISEASE WITHOUT ESOPHAGITIS: ICD-10-CM

## 2025-02-18 DIAGNOSIS — S32.810D MULTIPLE FRACTURES OF PELVIS WITH STABLE DISRUPTION OF PELVIC RING, SUBSEQUENT ENCOUNTER FOR FRACTURE WITH ROUTINE HEALING: Primary | ICD-10-CM

## 2025-02-19 ASSESSMENT — ENCOUNTER SYMPTOMS
SHORTNESS OF BREATH: 0
COLOR CHANGE: 0
VOMITING: 0
SINUS PRESSURE: 0
WHEEZING: 0
SORE THROAT: 0
COUGH: 0
EYE DISCHARGE: 0
CONSTIPATION: 0
BACK PAIN: 0
DIARRHEA: 0
TROUBLE SWALLOWING: 0
BLOOD IN STOOL: 0
ABDOMINAL PAIN: 0
SINUS PAIN: 0
FACIAL SWELLING: 0
NAUSEA: 0

## 2025-02-19 NOTE — PROGRESS NOTES
hernia is present.   Musculoskeletal:         General: No swelling, tenderness, deformity or signs of injury. Normal range of motion.      Right shoulder: Normal.      Left shoulder: Normal.      Right upper arm: Normal.      Left upper arm: Normal.      Right elbow: Normal.      Left elbow: Normal.      Right forearm: Normal.      Left forearm: Normal.      Right wrist: Normal.      Left wrist: Normal.      Right hand: Normal.      Left hand: Normal.      Cervical back: Normal, normal range of motion and neck supple. No rigidity or tenderness.      Thoracic back: Normal.      Lumbar back: Normal.      Right lower leg: Edema present.      Left lower leg: Edema present.      Comments: Multiple fractures   Lymphadenopathy:      Cervical: No cervical adenopathy.   Skin:     General: Skin is warm and dry.      Capillary Refill: Capillary refill takes 2 to 3 seconds.      Findings: No bruising, lesion or rash.   Neurological:      General: No focal deficit present.      Mental Status: He is alert and oriented to person, place, and time. Mental status is at baseline.      Sensory: No sensory deficit.      Motor: No weakness.      Gait: Gait normal.   Psychiatric:         Mood and Affect: Mood normal.         Behavior: Behavior normal.         Thought Content: Thought content normal.         Judgment: Judgment normal.         Assessment & Plan:  1. Multiple fractures of pelvis with stable disruption of pelvic ring, subsequent encounter for fracture with routine healing  2. Pulmonary cryptococcosis (HCC)  3. Type 2 diabetes mellitus with hyperglycemia, without long-term current use of insulin (HCC)  4. Essential (primary) hypertension  5. Gastro-esophageal reflux disease without esophagitis  6. Muscle weakness (generalized)  7. Personal history of pulmonary embolism  8. Dislocation of left hip, subsequent encounter       Hospital notes reviewed  Going to be seen today with ortho  Toprol, hydrALAZINE  and ASA therapy d/t HTN

## 2025-02-20 ASSESSMENT — ENCOUNTER SYMPTOMS
SHORTNESS OF BREATH: 0
EYE DISCHARGE: 0
BACK PAIN: 0
COLOR CHANGE: 0
VOMITING: 0
DIARRHEA: 0
TROUBLE SWALLOWING: 0
CONSTIPATION: 0
SINUS PAIN: 0
FACIAL SWELLING: 0
SINUS PRESSURE: 0
ABDOMINAL PAIN: 0
NAUSEA: 0
BLOOD IN STOOL: 0
COUGH: 0
WHEEZING: 0
SORE THROAT: 0
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
## 2025-02-24 ENCOUNTER — OUTSIDE SERVICES (OUTPATIENT)
Dept: PRIMARY CARE CLINIC | Age: 50
End: 2025-02-24

## 2025-02-24 DIAGNOSIS — J96.01 ACUTE RESPIRATORY FAILURE WITH HYPOXIA (HCC): ICD-10-CM

## 2025-02-24 DIAGNOSIS — S73.005D DISLOCATION OF LEFT HIP, SUBSEQUENT ENCOUNTER: ICD-10-CM

## 2025-02-24 DIAGNOSIS — I10 ESSENTIAL (PRIMARY) HYPERTENSION: ICD-10-CM

## 2025-02-24 DIAGNOSIS — S32.810D MULTIPLE FRACTURES OF PELVIS WITH STABLE DISRUPTION OF PELVIC RING, SUBSEQUENT ENCOUNTER FOR FRACTURE WITH ROUTINE HEALING: Primary | ICD-10-CM

## 2025-02-24 DIAGNOSIS — B45.0 PULMONARY CRYPTOCOCCOSIS (HCC): ICD-10-CM

## 2025-02-24 DIAGNOSIS — I46.2 CARDIAC ARREST DUE TO UNDERLYING CARDIAC CONDITION (HCC): ICD-10-CM

## 2025-02-24 DIAGNOSIS — E11.65 TYPE 2 DIABETES MELLITUS WITH HYPERGLYCEMIA, WITHOUT LONG-TERM CURRENT USE OF INSULIN (HCC): ICD-10-CM

## 2025-02-24 DIAGNOSIS — K21.9 GASTRO-ESOPHAGEAL REFLUX DISEASE WITHOUT ESOPHAGITIS: ICD-10-CM

## 2025-02-24 DIAGNOSIS — M62.81 MUSCLE WEAKNESS (GENERALIZED): ICD-10-CM

## 2025-02-24 DIAGNOSIS — Z86.711 PERSONAL HISTORY OF PULMONARY EMBOLISM: ICD-10-CM

## 2025-02-26 ASSESSMENT — ENCOUNTER SYMPTOMS
VOMITING: 0
ABDOMINAL PAIN: 0
SINUS PRESSURE: 0
COUGH: 0
FACIAL SWELLING: 0
SHORTNESS OF BREATH: 0
EYE DISCHARGE: 0
TROUBLE SWALLOWING: 0
NAUSEA: 0
SORE THROAT: 0
DIARRHEA: 0
CONSTIPATION: 0
BLOOD IN STOOL: 0
COLOR CHANGE: 0
SINUS PAIN: 0
WHEEZING: 0
BACK PAIN: 0
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
## 2025-03-04 ENCOUNTER — OUTSIDE SERVICES (OUTPATIENT)
Dept: PRIMARY CARE CLINIC | Age: 50
End: 2025-03-04

## 2025-03-04 ENCOUNTER — OFFICE VISIT (OUTPATIENT)
Dept: ORTHOPEDIC SURGERY | Age: 50
End: 2025-03-04
Payer: MEDICARE

## 2025-03-04 VITALS
HEART RATE: 121 BPM | TEMPERATURE: 97.9 F | OXYGEN SATURATION: 91 % | DIASTOLIC BLOOD PRESSURE: 89 MMHG | SYSTOLIC BLOOD PRESSURE: 128 MMHG

## 2025-03-04 DIAGNOSIS — K21.9 GASTRO-ESOPHAGEAL REFLUX DISEASE WITHOUT ESOPHAGITIS: ICD-10-CM

## 2025-03-04 DIAGNOSIS — B45.0 PULMONARY CRYPTOCOCCOSIS (HCC): ICD-10-CM

## 2025-03-04 DIAGNOSIS — T84.021A DISLOCATION OF INTERNAL LEFT HIP PROSTHESIS, INITIAL ENCOUNTER: ICD-10-CM

## 2025-03-04 DIAGNOSIS — S52.551D OTHER CLOSED EXTRA-ARTICULAR FRACTURE OF DISTAL END OF RIGHT RADIUS WITH ROUTINE HEALING, SUBSEQUENT ENCOUNTER: Primary | ICD-10-CM

## 2025-03-04 DIAGNOSIS — I10 ESSENTIAL (PRIMARY) HYPERTENSION: ICD-10-CM

## 2025-03-04 DIAGNOSIS — I46.2 CARDIAC ARREST DUE TO UNDERLYING CARDIAC CONDITION (HCC): ICD-10-CM

## 2025-03-04 DIAGNOSIS — T14.90XA TRAUMA: ICD-10-CM

## 2025-03-04 DIAGNOSIS — S73.005D DISLOCATION OF LEFT HIP, SUBSEQUENT ENCOUNTER: ICD-10-CM

## 2025-03-04 DIAGNOSIS — S32.810D CLOSED PELVIC RING FRACTURE WITH ROUTINE HEALING, SUBSEQUENT ENCOUNTER: Primary | ICD-10-CM

## 2025-03-04 DIAGNOSIS — J96.01 ACUTE RESPIRATORY FAILURE WITH HYPOXIA (HCC): ICD-10-CM

## 2025-03-04 DIAGNOSIS — S52.551D OTHER CLOSED EXTRA-ARTICULAR FRACTURE OF DISTAL END OF RIGHT RADIUS WITH ROUTINE HEALING, SUBSEQUENT ENCOUNTER: ICD-10-CM

## 2025-03-04 DIAGNOSIS — S32.810D MULTIPLE FRACTURES OF PELVIS WITH STABLE DISRUPTION OF PELVIC RING, SUBSEQUENT ENCOUNTER FOR FRACTURE WITH ROUTINE HEALING: Primary | ICD-10-CM

## 2025-03-04 DIAGNOSIS — E11.65 TYPE 2 DIABETES MELLITUS WITH HYPERGLYCEMIA, WITHOUT LONG-TERM CURRENT USE OF INSULIN (HCC): ICD-10-CM

## 2025-03-04 DIAGNOSIS — S32.811D MULTIPLE CLOSED FRACTURES OF PELVIS WITH UNSTABLE DISRUPTION OF PELVIC RING WITH ROUTINE HEALING, SUBSEQUENT ENCOUNTER: ICD-10-CM

## 2025-03-04 DIAGNOSIS — M62.81 MUSCLE WEAKNESS (GENERALIZED): ICD-10-CM

## 2025-03-04 DIAGNOSIS — Z86.711 PERSONAL HISTORY OF PULMONARY EMBOLISM: ICD-10-CM

## 2025-03-04 PROCEDURE — 3074F SYST BP LT 130 MM HG: CPT | Performed by: STUDENT IN AN ORGANIZED HEALTH CARE EDUCATION/TRAINING PROGRAM

## 2025-03-04 PROCEDURE — 3079F DIAST BP 80-89 MM HG: CPT | Performed by: STUDENT IN AN ORGANIZED HEALTH CARE EDUCATION/TRAINING PROGRAM

## 2025-03-04 PROCEDURE — 99213 OFFICE O/P EST LOW 20 MIN: CPT | Performed by: STUDENT IN AN ORGANIZED HEALTH CARE EDUCATION/TRAINING PROGRAM

## 2025-03-04 NOTE — PROGRESS NOTES
the acetabular component due to the fracture.  There does appear to be healing of the left acetabular fracture compared to other studies.  Left vascular stent is present.        ASSESSMENT/Plan:    Assessment & Plan  1.  Complication of left hip internal prosthesis secondary to left acetabular fracture and greater trochanteric hip fracture with dislocation of component and migration of acetabular component.      The patient's hip has been dislocated for 6 months, and the bone stock is currently suboptimal, limiting surgical options. The trauma team noted that his tissues are not pliable, which complicates the potential for successful reconstruction and reduction. A repeat CT scan is recommended to assess the extent of healing. The primary objective is to secure the bone stock and insert a new socket or cup into the weakened bone. The secondary goal is to reduce the hip back into the new socket and maintain its position to prevent further dislocation. Given the complexity of the case and the presence of a vascular stent, a referral to the [facility] for specialized care is advised. A repeat CT scan will be ordered today. A referral to the [facility] will be placed to expedite the process.    PROCEDURE  The patient underwent an initial hip replacement surgery in 2016.            Kike Zhao DO   Orthopaedic Surgery   3/4/25  12:53 PM

## 2025-03-06 ENCOUNTER — HOSPITAL ENCOUNTER (OUTPATIENT)
Dept: GENERAL RADIOLOGY | Age: 50
Discharge: HOME | End: 2025-03-08
Payer: MEDICARE

## 2025-03-06 ENCOUNTER — OFFICE VISIT (OUTPATIENT)
Dept: ORTHOPEDIC SURGERY | Age: 50
End: 2025-03-06
Payer: MEDICARE

## 2025-03-06 VITALS — RESPIRATION RATE: 18 BRPM | HEART RATE: 130 BPM | DIASTOLIC BLOOD PRESSURE: 89 MMHG | SYSTOLIC BLOOD PRESSURE: 148 MMHG

## 2025-03-06 DIAGNOSIS — S32.811A: ICD-10-CM

## 2025-03-06 DIAGNOSIS — T14.90XA TRAUMA: Primary | ICD-10-CM

## 2025-03-06 PROCEDURE — 73130 X-RAY EXAM OF HAND: CPT

## 2025-03-06 PROCEDURE — 99214 OFFICE O/P EST MOD 30 MIN: CPT | Performed by: ORTHOPAEDIC SURGERY

## 2025-03-06 PROCEDURE — 99213 OFFICE O/P EST LOW 20 MIN: CPT | Performed by: ORTHOPAEDIC SURGERY

## 2025-03-06 PROCEDURE — 3077F SYST BP >= 140 MM HG: CPT | Performed by: ORTHOPAEDIC SURGERY

## 2025-03-06 PROCEDURE — 3079F DIAST BP 80-89 MM HG: CPT | Performed by: ORTHOPAEDIC SURGERY

## 2025-03-06 RX ORDER — METOPROLOL SUCCINATE 25 MG/1
25 TABLET, EXTENDED RELEASE ORAL DAILY
COMMUNITY
Start: 2025-02-28

## 2025-03-06 RX ORDER — CHOLESTYRAMINE 4 G/9G
1 POWDER, FOR SUSPENSION ORAL DAILY
COMMUNITY
Start: 2025-03-02
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
## 2025-03-06 NOTE — PATIENT INSTRUCTIONS
INSTRUCTIONS FOR FACILITY      Weight Bearing: Weight bearing as tolerated right lower extremity. Use assistive devices when needed.    Therapy: Continue PT/OT per facility       Pain control: per facility physician      DVT Prophylaxis: Continue with Aspirin    Follow up:     In 2-3 months after appointment with [facility] is completed    Call office with any questions or concerns.

## 2025-03-07 NOTE — PROGRESS NOTES
(HCC)       Chart reviewed   Medications reviewed   Toprol, hydrALAZINE  and ASA therapy d/t HTN and SCA d/t underlying condition  DM2 managed with lispro sliding scale AC and HS A1c on 1/7/25 was 6.2  GERD tx with Prevacid 30 mg in AM  HX of PE and multiple fx pt on Lovenox therapy  Pain managed wit Oxy 5 mg q6h PRN  Turn & reposition q 2 hours   RLE: Cleanse with NS, apply xeroform, cover with foam dressing   Hinge brace to LUE to remain on at all times-may remove for hygiene   PT/OT working with him   Monitor labs   Continue current tx plan  Pt was referred to [facility] doctor to work with his hip dislocation    Eva ZAPATA MA am scribing for Dr. ADARSH Jensen.  Arsenio ZAPATA MD, personally performed the services described in this documentation as scribed by Eva Godoy and it is both accurate and complete

## 2025-03-12 ENCOUNTER — OUTSIDE SERVICES (OUTPATIENT)
Dept: PRIMARY CARE CLINIC | Age: 50
End: 2025-03-12
Payer: MEDICARE

## 2025-03-12 DIAGNOSIS — M62.81 MUSCLE WEAKNESS (GENERALIZED): ICD-10-CM

## 2025-03-12 DIAGNOSIS — Z79.01 LONG TERM (CURRENT) USE OF ANTICOAGULANTS: ICD-10-CM

## 2025-03-12 DIAGNOSIS — R60.0 BILATERAL LEG EDEMA: ICD-10-CM

## 2025-03-12 DIAGNOSIS — B45.0 PULMONARY CRYPTOCOCCOSIS (HCC): ICD-10-CM

## 2025-03-12 DIAGNOSIS — J96.01 ACUTE RESPIRATORY FAILURE WITH HYPOXIA: ICD-10-CM

## 2025-03-12 DIAGNOSIS — Z86.711 PERSONAL HISTORY OF PULMONARY EMBOLISM: ICD-10-CM

## 2025-03-12 DIAGNOSIS — S73.005D DISLOCATION OF LEFT HIP, SUBSEQUENT ENCOUNTER: ICD-10-CM

## 2025-03-12 DIAGNOSIS — E11.65 TYPE 2 DIABETES MELLITUS WITH HYPERGLYCEMIA, WITHOUT LONG-TERM CURRENT USE OF INSULIN (HCC): ICD-10-CM

## 2025-03-12 DIAGNOSIS — I46.2 CARDIAC ARREST DUE TO UNDERLYING CARDIAC CONDITION (HCC): ICD-10-CM

## 2025-03-12 DIAGNOSIS — I10 ESSENTIAL (PRIMARY) HYPERTENSION: ICD-10-CM

## 2025-03-12 DIAGNOSIS — I10 ESSENTIAL HYPERTENSION: ICD-10-CM

## 2025-03-12 DIAGNOSIS — K21.9 GASTRO-ESOPHAGEAL REFLUX DISEASE WITHOUT ESOPHAGITIS: ICD-10-CM

## 2025-03-12 DIAGNOSIS — S32.810D MULTIPLE FRACTURES OF PELVIS WITH STABLE DISRUPTION OF PELVIC RING, SUBSEQUENT ENCOUNTER FOR FRACTURE WITH ROUTINE HEALING: Primary | ICD-10-CM

## 2025-03-12 PROCEDURE — 99309 SBSQ NF CARE MODERATE MDM 30: CPT | Performed by: INTERNAL MEDICINE

## 2025-03-17 NOTE — PROGRESS NOTES
[facility]  Internal Medicine Residency Program  Progress Note - House Team     Patient:  Gabby Herman 48 y.o. male MRN: [id number]     Date of Service: 10/16/2022     CC: Left neck pain and hoarseness  Overnight events: Patient was commenced on 5 L intranasal oxygen for desaturation    Subjective     Patient was seen and examined this morning at bedside in no acute distress. Patient reports improvement in his voice, however still complains of left-sided neck pain. He also complained of sneezing after the procedure yesterday however no nasal discharge. Patient has been commenced on clear oral feeding and has been tolerating well. He denies shortness of breath at this time. Labs this morning showed leukocytosis WBC count 15.7 likely due to IV Decadron, Hb has been stable 10.9 and thrombocytosis is improving platelets 754, INR still pending. Blood cultures showed no growth in 24 hours. >220>229, patient previously on IV decadron, stopped today as per otolaryngology. Patient currently on basal insulin Lantus 7 units low-dose sliding scale. Objective     Physical Exam:  Vitals: BP (!) 169/94   Pulse 100   Temp 98 °F (36.7 °C) (Temporal)   Resp 26   Ht 6' 1\" (1.854 m)   Wt 290 lb (131.5 kg)   SpO2 96%   BMI 38.26 kg/m²     I & O - 24hr: No intake/output data recorded. General Appearance: alert, appears stated age, and cooperative  HEENT:  Head: Normocephalic, no lesions, without obvious abnormality. Neck: supple, symmetrical, trachea midline, lateral neck tenderness appreciated  Lung: Wheezing quite improved this morning  Heart: S1, S2 normal.  Tachycardic  Abdomen:  Soft, tenderness to left lumbar area improving  Extremities:  extremities normal, atraumatic, no cyanosis or edema  Musculokeletal: No joint swelling, no muscle tenderness. ROM normal in all joints of extremities.    Neurologic: Mental status: Alert, oriented, thought content appropriate    Pertinent Pt to 9S via cart offering no complaints with R radial site soft drsg D&I.   Labs & Imaging Studies     CBC with Differential:    Lab Results   Component Value Date/Time    WBC 15.7 10/16/2022 04:50 AM    RBC 3.70 10/16/2022 04:50 AM    HGB 10.9 10/16/2022 04:50 AM    HCT 33.0 10/16/2022 04:50 AM     10/16/2022 04:50 AM    MCV 89.2 10/16/2022 04:50 AM    MCH 29.5 10/16/2022 04:50 AM    MCHC 33.0 10/16/2022 04:50 AM    RDW 13.6 10/16/2022 04:50 AM    NRBC 0.9 11/19/2020 06:38 AM    SEGSPCT 80 11/26/2013 01:45 PM    LYMPHOPCT 4.3 10/16/2022 04:50 AM    MONOPCT 2.5 10/16/2022 04:50 AM    BASOPCT 0.0 10/16/2022 04:50 AM    MONOSABS 0.39 10/16/2022 04:50 AM    LYMPHSABS 0.67 10/16/2022 04:50 AM    EOSABS 0.00 10/16/2022 04:50 AM    BASOSABS 0.00 10/16/2022 04:50 AM     CMP:    Lab Results   Component Value Date/Time     10/16/2022 04:04 AM    K 4.0 10/16/2022 04:04 AM     10/16/2022 04:04 AM    CO2 23 10/16/2022 04:04 AM    BUN 25 10/16/2022 04:04 AM    CREATININE 0.9 10/16/2022 04:04 AM    GFRAA >60 10/16/2022 04:04 AM    LABGLOM >60 10/16/2022 04:04 AM    GLUCOSE 229 10/16/2022 04:04 AM    GLUCOSE 125 05/11/2012 08:42 AM    PROT 7.6 10/16/2022 04:04 AM    LABALBU 4.0 10/16/2022 04:04 AM    LABALBU 4.8 05/11/2012 08:42 AM    CALCIUM 9.2 10/16/2022 04:04 AM    BILITOT 0.4 10/16/2022 04:04 AM    ALKPHOS 116 10/16/2022 04:04 AM    AST 23 10/16/2022 04:04 AM    ALT 27 10/16/2022 04:04 AM     PT/INR:    Lab Results   Component Value Date/Time    PROTIME 60.1 10/15/2022 05:10 AM    PROTIME 35.6 08/19/2022 01:02 PM    INR 5.6 10/15/2022 05:10 AM     Warfarin PT/INR:  No components found for: PTPATWAR, PTINRWAR    CT SOFT TISSUE NECK W CONTRAST   Final Result   Abnormal retropharyngeal fluid collection consistent with a phlegmon   extending from the skull base to the C7 level measuring 1 cm in thickness. Edematous appearance of the epiglottis concerning for acute epiglottitis. There is mild-to-moderate supraglottic airway narrowing.       Diffusely edematous appearance of the oropharynx and hypopharynx consistent   with a severe pharyngitis. No drainable abscess identified.               Resident's Assessment and Plan     Assessment and Plan:    Epiglottic hematoma 2/2 supratherapeutic INR s/p ENT endoscopy r/o angioedema  CT soft tissue neck 10/14/2022 - showed retropharyngeal fluid collection, s/o phlegmon extending from skull base to C7, 1 cm with acute epiglottitis and acute pharyngitis and mild to moderate supraglottic airway narrowing  Flexible nasopharyngeal endoscopy 10/14/2022 - showed significant amount of secretions with edematous/omega shaped epiglottis and retropharyngeal swelling as per otolaryngology documentation  Patient could be very difficult intubation secondary to epiglottic swelling and could require tracheostomy as per otolaryngology  Repeat flexible nasopharyngeal endoscopy 10/15/2022 - showed airway appears much more favorable for intubation if needed, left-sided epiglottal hematoma still present  Plan:  Otolaryngology on board, appreciate inputs  Follow otolaryngology recommendations  Continue CCU care for close airway observation  Mental respiratory status/airway closely, keep low threshold for tracheostomy  Okay to wean down IV Decadron as per otolaryngology  Okay to advance clear liquid diet if successful bedside swallow  Continue Pepcid  Continue Benadryl  Continue Unasyn  Discontinue lisinopril and all ACEI/ARB's  Keep flexible nasolaryngoscopy at bedside     Leukocytosis likely reactive from IV steroids  Patient is hemodynamically stable  WBC count 12.9>9.0>15.7  Infectious work-up essentially negative  Plan:  Monitor CBC daily     Supra therapeutic INR, (9.9) in the setting of chronic Coumadin  S/p TXA, FFP x 2  Repeat INR still 9.9, previous in 3.s at home  INR 9.9>5.6  Plan:  Continue to monitor INR every 12 hours  Consider vitamin K if INR>10  Consider [facility] if any surgical procedure with supratherapeutic INR     Normocytic anemia with thrombocytosis r/o hemoconcentration  Hg 11.9>10.9, platelets 626>473  Plan:  Monitor CBC daily     Hx recurrent DVTs  On chronic coumadin at home  INR currently supratherapeutic  Plan:  Hold Coumadin for now in light of supratherapeutic INR  Monitor INR 12 hourly     Insulin-dependent diabetes melitis, well controlled  On home Lantus 25 units at home  >220>229, patient is on IV decadron  Plan:  Continue Lantus 7 units, LDSS  POCT every 6 hours, goal 140-180  Hypoglycemic protocol     HTN, stable  On home Toprol XL 25 OD  Plan:  Hold BP meds for now  --------------------------------------------------------------------------------------------------------------------------------------------------  DM neuropathy, on home Gabapentin 300 TID  Hx c-spine fusion surgery, 2000        PT/OT evaluation: Not indicated  DVT prophylaxis/ GI prophylaxis: No indication  Disposition: admit to CCU    Aj Pena MD, PGY-1  Attending physician: Dr. Piero Nguyễn  Attending Physician Statement:  Bethel Bradshaw M.D., F.A.C.P. I have discussed the case, including pertinent history and exam findings with the resident/NP. I have seen and examined the patient and the key elements of the encounter have been performed by me. I agree with the resident ROS, PMHx, PSHx, meds reviewed and assessment, plan and orders as documented by the resident/NP       Hospital charts reviewed, including other providers notes, relevant labs and imaging. One throat ? L neck pain +voice change  Difficulty swallowing- painful  \"Epiglottitis\"- doubt infectious? ASO? Negative viral panel  - also covered for ACE/bradykinin assoc - FFP/TXA  Normal nasopharynx, large area of ecchymoses and edema to the left side of the epiglottis, normal tongue base, retropharyngeal swelling is much improved  He developed vocal cord swelling, ? \"Due to thin blood and hematoma\"- disruption from coughing  ?   Or other etiology +coumadin  Vs clot in vocal cord Hypoxic respiratory failure-- on o2 now  +stridor-- on 5LNC  -- on decadron   (no racemic epi)     L sided flank pain, rule out hematoma (didn't get lovenox shot?)     -holding coumadin - hx of multiple DVTs. last clot 2021  So they stopped eliquis,, stents and lysis of clots in past   likely hematoma from supratherapeutic (rule out clot first  vs just a spontanous bleed)    Been 8-9 weeks last coumadin check--in mid aug inr 3.1 (9.9 inr-- no vit K-- 5.6-- back up to 8.4 after FFP wore off-)- couamdin dose 2 days ago -- expect inr to drop further. (Vs trace vit K)-- ICU team ordered 5mg PO    DM2 controlled prior to admission   aic 6.1-- now on decadron    Hgb 10.9-- in May hgb 15? NOT Accurate-- otherwise most hemoglobin   Platelet count chronically elevated- +clotting predispotion --   Hx of splenic vein thrombosis  Caused by asplenia-- tunde 2 negative in past  >50% of time spent coordinating care with other providers and/or counseling patient/family  Remainder of medical problems as per resident note.

## 2025-03-18 NOTE — PROGRESS NOTES
Heart Disease Maternal Grandmother     Cancer Maternal Grandfather         Skin    Diabetes Paternal Grandfather     Stroke Maternal Aunt     Cancer Paternal Uncle         skin        SH: reviewed and updated    reports that he has never smoked. He has never used smokeless tobacco. He reports that he does not currently use alcohol. He reports that he does not currently use drugs after having used the following drugs: Fentanyl.     Objective:  Vital signs for Fausot was reviewed in the nursing home record.     Physical Exam:  Physical Exam  Vitals and nursing note reviewed.   Constitutional:       General: He is not in acute distress.     Appearance: Normal appearance. He is obese.   HENT:      Head: Normocephalic and atraumatic.      Right Ear: Tympanic membrane, ear canal and external ear normal.      Left Ear: Tympanic membrane, ear canal and external ear normal. There is no impacted cerumen.      Nose: Nose normal. No congestion or rhinorrhea.      Mouth/Throat:      Mouth: Mucous membranes are moist.      Pharynx: Oropharynx is clear. No oropharyngeal exudate or posterior oropharyngeal erythema.   Eyes:      General: No scleral icterus.        Right eye: No discharge.         Left eye: No discharge.      Conjunctiva/sclera: Conjunctivae normal.      Pupils: Pupils are equal, round, and reactive to light.   Neck:      Vascular: No carotid bruit.   Cardiovascular:      Rate and Rhythm: Normal rate and regular rhythm.      Pulses: Normal pulses.      Heart sounds: Normal heart sounds. No murmur heard.     No gallop.   Pulmonary:      Effort: Pulmonary effort is normal. No respiratory distress.      Breath sounds: Normal breath sounds. No wheezing, rhonchi or rales.   Chest:      Chest wall: No tenderness.   Abdominal:      General: Abdomen is flat. Bowel sounds are normal.      Palpations: Abdomen is soft. There is no mass.      Tenderness: There is no abdominal tenderness. There is no guarding.      Hernia: No

## 2025-03-20 ENCOUNTER — OUTSIDE SERVICES (OUTPATIENT)
Dept: PRIMARY CARE CLINIC | Age: 50
End: 2025-03-20

## 2025-03-20 DIAGNOSIS — S32.810D MULTIPLE FRACTURES OF PELVIS WITH STABLE DISRUPTION OF PELVIC RING, SUBSEQUENT ENCOUNTER FOR FRACTURE WITH ROUTINE HEALING: Primary | ICD-10-CM

## 2025-03-20 DIAGNOSIS — E11.65 TYPE 2 DIABETES MELLITUS WITH HYPERGLYCEMIA, WITHOUT LONG-TERM CURRENT USE OF INSULIN (HCC): ICD-10-CM

## 2025-03-20 DIAGNOSIS — R60.0 BILATERAL LEG EDEMA: ICD-10-CM

## 2025-03-20 DIAGNOSIS — S73.005D DISLOCATION OF LEFT HIP, SUBSEQUENT ENCOUNTER: ICD-10-CM

## 2025-03-20 DIAGNOSIS — I10 ESSENTIAL HYPERTENSION: ICD-10-CM

## 2025-03-20 DIAGNOSIS — B45.0 PULMONARY CRYPTOCOCCOSIS (HCC): ICD-10-CM

## 2025-03-20 DIAGNOSIS — J96.01 ACUTE RESPIRATORY FAILURE WITH HYPOXIA: ICD-10-CM

## 2025-03-20 DIAGNOSIS — Z79.01 LONG TERM (CURRENT) USE OF ANTICOAGULANTS: ICD-10-CM

## 2025-03-20 DIAGNOSIS — I10 ESSENTIAL (PRIMARY) HYPERTENSION: ICD-10-CM

## 2025-03-20 DIAGNOSIS — K21.9 GASTRO-ESOPHAGEAL REFLUX DISEASE WITHOUT ESOPHAGITIS: ICD-10-CM

## 2025-03-20 DIAGNOSIS — Z86.711 PERSONAL HISTORY OF PULMONARY EMBOLISM: ICD-10-CM

## 2025-03-20 DIAGNOSIS — M62.81 MUSCLE WEAKNESS (GENERALIZED): ICD-10-CM

## 2025-03-20 DIAGNOSIS — I46.2 CARDIAC ARREST DUE TO UNDERLYING CARDIAC CONDITION (HCC): ICD-10-CM

## 2025-03-25 ENCOUNTER — OUTSIDE SERVICES (OUTPATIENT)
Dept: PRIMARY CARE CLINIC | Age: 50
End: 2025-03-25
Payer: MEDICARE

## 2025-03-25 DIAGNOSIS — Z86.711 PERSONAL HISTORY OF PULMONARY EMBOLISM: ICD-10-CM

## 2025-03-25 DIAGNOSIS — K21.9 GASTRO-ESOPHAGEAL REFLUX DISEASE WITHOUT ESOPHAGITIS: ICD-10-CM

## 2025-03-25 DIAGNOSIS — I10 ESSENTIAL (PRIMARY) HYPERTENSION: ICD-10-CM

## 2025-03-25 DIAGNOSIS — E11.65 TYPE 2 DIABETES MELLITUS WITH HYPERGLYCEMIA, WITHOUT LONG-TERM CURRENT USE OF INSULIN (HCC): ICD-10-CM

## 2025-03-25 DIAGNOSIS — R60.0 BILATERAL LEG EDEMA: ICD-10-CM

## 2025-03-25 DIAGNOSIS — S73.005D DISLOCATION OF LEFT HIP, SUBSEQUENT ENCOUNTER: ICD-10-CM

## 2025-03-25 DIAGNOSIS — S32.810D MULTIPLE FRACTURES OF PELVIS WITH STABLE DISRUPTION OF PELVIC RING, SUBSEQUENT ENCOUNTER FOR FRACTURE WITH ROUTINE HEALING: Primary | ICD-10-CM

## 2025-03-25 DIAGNOSIS — Z79.01 LONG TERM (CURRENT) USE OF ANTICOAGULANTS: ICD-10-CM

## 2025-03-25 DIAGNOSIS — J96.01 ACUTE RESPIRATORY FAILURE WITH HYPOXIA: ICD-10-CM

## 2025-03-25 DIAGNOSIS — I46.2 CARDIAC ARREST DUE TO UNDERLYING CARDIAC CONDITION (HCC): ICD-10-CM

## 2025-03-25 DIAGNOSIS — B45.0 PULMONARY CRYPTOCOCCOSIS (HCC): ICD-10-CM

## 2025-03-25 DIAGNOSIS — M62.81 MUSCLE WEAKNESS (GENERALIZED): ICD-10-CM

## 2025-03-25 PROCEDURE — 99309 SBSQ NF CARE MODERATE MDM 30: CPT | Performed by: INTERNAL MEDICINE

## 2025-03-25 ASSESSMENT — ENCOUNTER SYMPTOMS
SINUS PRESSURE: 0
TROUBLE SWALLOWING: 0
DIARRHEA: 0
COLOR CHANGE: 0
EYE DISCHARGE: 0
COUGH: 0
SORE THROAT: 0
FACIAL SWELLING: 0
SINUS PAIN: 0
BLOOD IN STOOL: 0
CONSTIPATION: 0
WHEEZING: 0
ABDOMINAL PAIN: 0
NAUSEA: 0
VOMITING: 0
SHORTNESS OF BREATH: 0
BACK PAIN: 0
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
## 2025-03-27 ENCOUNTER — TRANSCRIBE ORDERS (OUTPATIENT)
Dept: ADMINISTRATIVE | Age: 50
End: 2025-03-27

## 2025-03-27 DIAGNOSIS — J96.01 ACUTE RESPIRATORY FAILURE WITH HYPOXIA: Primary | ICD-10-CM

## 2025-04-08 ENCOUNTER — OUTSIDE SERVICES (OUTPATIENT)
Dept: PRIMARY CARE CLINIC | Age: 50
End: 2025-04-08
Payer: MEDICARE

## 2025-04-08 DIAGNOSIS — S32.810D MULTIPLE FRACTURES OF PELVIS WITH STABLE DISRUPTION OF PELVIC RING, SUBSEQUENT ENCOUNTER FOR FRACTURE WITH ROUTINE HEALING: Primary | ICD-10-CM

## 2025-04-08 DIAGNOSIS — B45.0 PULMONARY CRYPTOCOCCOSIS (HCC): ICD-10-CM

## 2025-04-08 DIAGNOSIS — S73.005D DISLOCATION OF LEFT HIP, SUBSEQUENT ENCOUNTER: ICD-10-CM

## 2025-04-08 DIAGNOSIS — M62.81 MUSCLE WEAKNESS (GENERALIZED): ICD-10-CM

## 2025-04-08 DIAGNOSIS — I10 ESSENTIAL (PRIMARY) HYPERTENSION: ICD-10-CM

## 2025-04-08 DIAGNOSIS — K21.9 GASTRO-ESOPHAGEAL REFLUX DISEASE WITHOUT ESOPHAGITIS: ICD-10-CM

## 2025-04-08 DIAGNOSIS — E11.65 TYPE 2 DIABETES MELLITUS WITH HYPERGLYCEMIA, WITHOUT LONG-TERM CURRENT USE OF INSULIN (HCC): ICD-10-CM

## 2025-04-08 DIAGNOSIS — Z86.711 PERSONAL HISTORY OF PULMONARY EMBOLISM: ICD-10-CM

## 2025-04-08 DIAGNOSIS — R60.0 BILATERAL LEG EDEMA: ICD-10-CM

## 2025-04-08 DIAGNOSIS — J96.01 ACUTE RESPIRATORY FAILURE WITH HYPOXIA: ICD-10-CM

## 2025-04-08 PROCEDURE — 99309 SBSQ NF CARE MODERATE MDM 30: CPT | Performed by: INTERNAL MEDICINE

## 2025-04-18 ENCOUNTER — HOSPITAL ENCOUNTER (OUTPATIENT)
Dept: CT IMAGING | Age: 50
Discharge: HOME | End: 2025-04-20
Attending: INTERNAL MEDICINE
Payer: MEDICARE

## 2025-04-18 DIAGNOSIS — J96.01 ACUTE RESPIRATORY FAILURE WITH HYPOXIA (HCC): ICD-10-CM

## 2025-04-18 PROCEDURE — 71250 CT THORAX DX C-: CPT

## 2025-04-22 ENCOUNTER — OUTSIDE SERVICES (OUTPATIENT)
Dept: PRIMARY CARE CLINIC | Age: 50
End: 2025-04-22
Payer: MEDICARE

## 2025-04-22 DIAGNOSIS — S73.005D DISLOCATION OF LEFT HIP, SUBSEQUENT ENCOUNTER: ICD-10-CM

## 2025-04-22 DIAGNOSIS — J96.01 ACUTE RESPIRATORY FAILURE WITH HYPOXIA (HCC): ICD-10-CM

## 2025-04-22 DIAGNOSIS — S32.810D MULTIPLE FRACTURES OF PELVIS WITH STABLE DISRUPTION OF PELVIC RING, SUBSEQUENT ENCOUNTER FOR FRACTURE WITH ROUTINE HEALING: Primary | ICD-10-CM

## 2025-04-22 DIAGNOSIS — M62.81 MUSCLE WEAKNESS (GENERALIZED): ICD-10-CM

## 2025-04-22 DIAGNOSIS — E11.65 TYPE 2 DIABETES MELLITUS WITH HYPERGLYCEMIA, WITHOUT LONG-TERM CURRENT USE OF INSULIN (HCC): ICD-10-CM

## 2025-04-22 DIAGNOSIS — R60.0 BILATERAL LEG EDEMA: ICD-10-CM

## 2025-04-22 DIAGNOSIS — B45.0 PULMONARY CRYPTOCOCCOSIS (HCC): ICD-10-CM

## 2025-04-22 DIAGNOSIS — I10 ESSENTIAL (PRIMARY) HYPERTENSION: ICD-10-CM

## 2025-04-22 DIAGNOSIS — Z86.711 PERSONAL HISTORY OF PULMONARY EMBOLISM: ICD-10-CM

## 2025-04-22 DIAGNOSIS — K21.9 GASTRO-ESOPHAGEAL REFLUX DISEASE WITHOUT ESOPHAGITIS: ICD-10-CM

## 2025-04-22 PROCEDURE — 99309 SBSQ NF CARE MODERATE MDM 30: CPT | Performed by: INTERNAL MEDICINE

## 2025-04-23 NOTE — PROGRESS NOTES
Heart Disease Maternal Grandmother     Cancer Maternal Grandfather         Skin    Diabetes Paternal Grandfather     Stroke Maternal Aunt     Cancer Paternal Uncle         skin        SH: reviewed and updated    reports that he has never smoked. He has never used smokeless tobacco. He reports that he does not currently use alcohol. He reports that he does not currently use drugs after having used the following drugs: Fentanyl.     Objective:  Vital signs for Fausto was reviewed in the nursing home record.     Physical Exam:  Physical Exam  Vitals and nursing note reviewed.   Constitutional:       General: He is not in acute distress.     Appearance: Normal appearance. He is obese.   HENT:      Head: Normocephalic and atraumatic.      Right Ear: Tympanic membrane, ear canal and external ear normal.      Left Ear: Tympanic membrane, ear canal and external ear normal. There is no impacted cerumen.      Nose: Nose normal. No congestion or rhinorrhea.      Mouth/Throat:      Mouth: Mucous membranes are moist.      Pharynx: Oropharynx is clear. No oropharyngeal exudate or posterior oropharyngeal erythema.   Eyes:      General: No scleral icterus.        Right eye: No discharge.         Left eye: No discharge.      Conjunctiva/sclera: Conjunctivae normal.      Pupils: Pupils are equal, round, and reactive to light.   Neck:      Vascular: No carotid bruit.   Cardiovascular:      Rate and Rhythm: Normal rate and regular rhythm.      Pulses: Normal pulses.      Heart sounds: Normal heart sounds. No murmur heard.     No gallop.   Pulmonary:      Effort: Pulmonary effort is normal. No respiratory distress.      Breath sounds: Normal breath sounds. No wheezing, rhonchi or rales.   Chest:      Chest wall: No tenderness.   Abdominal:      General: Abdomen is flat. Bowel sounds are normal.      Palpations: Abdomen is soft. There is no mass.      Tenderness: There is no abdominal tenderness. There is no guarding.      Hernia: No

## 2025-04-28 ENCOUNTER — OUTSIDE SERVICES (OUTPATIENT)
Dept: PRIMARY CARE CLINIC | Age: 50
End: 2025-04-28
Payer: MEDICARE

## 2025-04-28 DIAGNOSIS — J96.01 ACUTE RESPIRATORY FAILURE WITH HYPOXIA (HCC): ICD-10-CM

## 2025-04-28 DIAGNOSIS — K21.9 GASTRO-ESOPHAGEAL REFLUX DISEASE WITHOUT ESOPHAGITIS: ICD-10-CM

## 2025-04-28 DIAGNOSIS — S32.810D MULTIPLE FRACTURES OF PELVIS WITH STABLE DISRUPTION OF PELVIC RING, SUBSEQUENT ENCOUNTER FOR FRACTURE WITH ROUTINE HEALING: Primary | ICD-10-CM

## 2025-04-28 DIAGNOSIS — M62.81 MUSCLE WEAKNESS (GENERALIZED): ICD-10-CM

## 2025-04-28 DIAGNOSIS — I10 ESSENTIAL (PRIMARY) HYPERTENSION: ICD-10-CM

## 2025-04-28 DIAGNOSIS — B45.0 PULMONARY CRYPTOCOCCOSIS (HCC): ICD-10-CM

## 2025-04-28 DIAGNOSIS — Z86.711 PERSONAL HISTORY OF PULMONARY EMBOLISM: ICD-10-CM

## 2025-04-28 DIAGNOSIS — S73.005D DISLOCATION OF LEFT HIP, SUBSEQUENT ENCOUNTER: ICD-10-CM

## 2025-04-28 DIAGNOSIS — E11.65 TYPE 2 DIABETES MELLITUS WITH HYPERGLYCEMIA, WITHOUT LONG-TERM CURRENT USE OF INSULIN (HCC): ICD-10-CM

## 2025-04-28 DIAGNOSIS — R60.0 BILATERAL LEG EDEMA: ICD-10-CM

## 2025-04-28 PROCEDURE — 99309 SBSQ NF CARE MODERATE MDM 30: CPT | Performed by: INTERNAL MEDICINE

## 2025-04-30 ASSESSMENT — ENCOUNTER SYMPTOMS
VOMITING: 0
DIARRHEA: 0
NAUSEA: 0
COLOR CHANGE: 0
COUGH: 0
TROUBLE SWALLOWING: 0
SINUS PRESSURE: 0
EYE DISCHARGE: 0
BACK PAIN: 0
ABDOMINAL PAIN: 0
BLOOD IN STOOL: 0
SINUS PAIN: 0
CONSTIPATION: 0
FACIAL SWELLING: 0
SORE THROAT: 0
SHORTNESS OF BREATH: 0
WHEEZING: 0
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
## 2025-05-08 ENCOUNTER — HOSPITAL ENCOUNTER (OUTPATIENT)
Dept: GENERAL RADIOLOGY | Age: 50
Discharge: HOME | End: 2025-05-10
Payer: MEDICARE

## 2025-05-08 ENCOUNTER — OFFICE VISIT (OUTPATIENT)
Dept: ORTHOPEDIC SURGERY | Age: 50
End: 2025-05-08
Payer: MEDICARE

## 2025-05-08 VITALS — SYSTOLIC BLOOD PRESSURE: 122 MMHG | DIASTOLIC BLOOD PRESSURE: 77 MMHG | OXYGEN SATURATION: 92 % | HEART RATE: 124 BPM

## 2025-05-08 DIAGNOSIS — G56.01 RIGHT CARPAL TUNNEL SYNDROME: ICD-10-CM

## 2025-05-08 DIAGNOSIS — S82.142S CLOSED FRACTURE OF LEFT TIBIAL PLATEAU, SEQUELA: Primary | ICD-10-CM

## 2025-05-08 DIAGNOSIS — S52.551D OTHER CLOSED EXTRA-ARTICULAR FRACTURE OF DISTAL END OF RIGHT RADIUS WITH ROUTINE HEALING, SUBSEQUENT ENCOUNTER: ICD-10-CM

## 2025-05-08 DIAGNOSIS — T84.021A DISLOCATION OF INTERNAL LEFT HIP PROSTHESIS, INITIAL ENCOUNTER: ICD-10-CM

## 2025-05-08 DIAGNOSIS — S32.810D CLOSED PELVIC RING FRACTURE WITH ROUTINE HEALING, SUBSEQUENT ENCOUNTER: ICD-10-CM

## 2025-05-08 PROCEDURE — 72190 X-RAY EXAM OF PELVIS: CPT

## 2025-05-08 PROCEDURE — 73560 X-RAY EXAM OF KNEE 1 OR 2: CPT

## 2025-05-08 PROCEDURE — 99212 OFFICE O/P EST SF 10 MIN: CPT | Performed by: PHYSICIAN ASSISTANT

## 2025-05-08 PROCEDURE — 73552 X-RAY EXAM OF FEMUR 2/>: CPT
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
## 2025-05-20 ENCOUNTER — OUTSIDE SERVICES (OUTPATIENT)
Dept: PRIMARY CARE CLINIC | Age: 50
End: 2025-05-20
Payer: MEDICARE

## 2025-05-20 DIAGNOSIS — B45.0 PULMONARY CRYPTOCOCCOSIS (HCC): ICD-10-CM

## 2025-05-20 DIAGNOSIS — I10 ESSENTIAL (PRIMARY) HYPERTENSION: ICD-10-CM

## 2025-05-20 DIAGNOSIS — E11.65 TYPE 2 DIABETES MELLITUS WITH HYPERGLYCEMIA, WITHOUT LONG-TERM CURRENT USE OF INSULIN (HCC): ICD-10-CM

## 2025-05-20 DIAGNOSIS — K21.9 GASTRO-ESOPHAGEAL REFLUX DISEASE WITHOUT ESOPHAGITIS: ICD-10-CM

## 2025-05-20 DIAGNOSIS — M62.81 MUSCLE WEAKNESS (GENERALIZED): ICD-10-CM

## 2025-05-20 DIAGNOSIS — Z86.711 PERSONAL HISTORY OF PULMONARY EMBOLISM: ICD-10-CM

## 2025-05-20 DIAGNOSIS — J96.01 ACUTE RESPIRATORY FAILURE WITH HYPOXIA (HCC): ICD-10-CM

## 2025-05-20 DIAGNOSIS — S73.005D DISLOCATION OF LEFT HIP, SUBSEQUENT ENCOUNTER: ICD-10-CM

## 2025-05-20 DIAGNOSIS — S32.810D MULTIPLE FRACTURES OF PELVIS WITH STABLE DISRUPTION OF PELVIC RING, SUBSEQUENT ENCOUNTER FOR FRACTURE WITH ROUTINE HEALING: Primary | ICD-10-CM

## 2025-05-20 DIAGNOSIS — R60.0 BILATERAL LEG EDEMA: ICD-10-CM

## 2025-05-20 PROCEDURE — 99309 SBSQ NF CARE MODERATE MDM 30: CPT | Performed by: INTERNAL MEDICINE

## 2025-05-21 NOTE — PROGRESS NOTES
hernia is present.   Musculoskeletal:         General: No swelling, tenderness, deformity or signs of injury. Normal range of motion.      Right shoulder: Normal.      Left shoulder: Normal.      Right upper arm: Normal.      Left upper arm: Normal.      Right elbow: Normal.      Left elbow: Normal.      Right forearm: Normal.      Left forearm: Normal.      Right wrist: Normal.      Left wrist: Normal.      Right hand: Normal.      Left hand: Normal.      Cervical back: Normal, normal range of motion and neck supple. No rigidity or tenderness.      Thoracic back: Normal.      Lumbar back: Normal.      Right lower leg: Edema present.      Left lower leg: Edema present.      Comments: Multiple fractures   Lymphadenopathy:      Cervical: No cervical adenopathy.   Skin:     General: Skin is warm and dry.      Capillary Refill: Capillary refill takes 2 to 3 seconds.      Findings: No bruising, lesion or rash.   Neurological:      General: No focal deficit present.      Mental Status: He is alert and oriented to person, place, and time. Mental status is at baseline.      Sensory: No sensory deficit.      Motor: No weakness.      Gait: Gait normal.   Psychiatric:         Mood and Affect: Mood normal.         Behavior: Behavior normal.         Thought Content: Thought content normal.         Judgment: Judgment normal.         Assessment & Plan:  1. Multiple fractures of pelvis with stable disruption of pelvic ring, subsequent encounter for fracture with routine healing  2. 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 Type 2 diabetes mellitus with hyperglycemia, without long-term current use of insulin ([facility])  4. Essential (primary) hypertension  5. Gastro-esophageal reflux disease without esophagitis  6. Muscle weakness (generalized)  7. Personal history of pulmonary embolism  8. Dislocation of left hip, subsequent encounter  9. Acute respiratory failure with hypoxia (HCC)  10. Bilateral leg edema       Chart reviewed   Medications

## 2025-05-22 ENCOUNTER — TELEPHONE (OUTPATIENT)
Dept: CARDIOLOGY CLINIC | Age: 50
End: 2025-05-22

## 2025-06-02 ENCOUNTER — OFFICE VISIT (OUTPATIENT)
Dept: CARDIOLOGY CLINIC | Age: 50
End: 2025-06-02

## 2025-06-02 ENCOUNTER — OUTSIDE SERVICES (OUTPATIENT)
Dept: PRIMARY CARE CLINIC | Age: 50
End: 2025-06-02

## 2025-06-02 VITALS
SYSTOLIC BLOOD PRESSURE: 128 MMHG | BODY MASS INDEX: 37.77 KG/M2 | HEIGHT: 73 IN | HEART RATE: 139 BPM | WEIGHT: 285 LBS | DIASTOLIC BLOOD PRESSURE: 82 MMHG

## 2025-06-02 DIAGNOSIS — S73.005D DISLOCATION OF LEFT HIP, SUBSEQUENT ENCOUNTER: ICD-10-CM

## 2025-06-02 DIAGNOSIS — B45.0 PULMONARY CRYPTOCOCCOSIS (HCC): ICD-10-CM

## 2025-06-02 DIAGNOSIS — K21.9 GASTRO-ESOPHAGEAL REFLUX DISEASE WITHOUT ESOPHAGITIS: ICD-10-CM

## 2025-06-02 DIAGNOSIS — R60.0 BILATERAL LEG EDEMA: ICD-10-CM

## 2025-06-02 DIAGNOSIS — J96.01 ACUTE RESPIRATORY FAILURE WITH HYPOXIA (HCC): ICD-10-CM

## 2025-06-02 DIAGNOSIS — I10 ESSENTIAL (PRIMARY) HYPERTENSION: ICD-10-CM

## 2025-06-02 DIAGNOSIS — M62.81 MUSCLE WEAKNESS (GENERALIZED): ICD-10-CM

## 2025-06-02 DIAGNOSIS — Z86.711 PERSONAL HISTORY OF PULMONARY EMBOLISM: ICD-10-CM

## 2025-06-02 DIAGNOSIS — E11.65 TYPE 2 DIABETES MELLITUS WITH HYPERGLYCEMIA, WITHOUT LONG-TERM CURRENT USE OF INSULIN (HCC): ICD-10-CM

## 2025-06-02 DIAGNOSIS — S32.810D MULTIPLE FRACTURES OF PELVIS WITH STABLE DISRUPTION OF PELVIC RING, SUBSEQUENT ENCOUNTER FOR FRACTURE WITH ROUTINE HEALING: Primary | ICD-10-CM

## 2025-06-02 DIAGNOSIS — Z01.810 PREOP CARDIOVASCULAR EXAM: Primary | ICD-10-CM

## 2025-06-02 RX ORDER — HYDRALAZINE HYDROCHLORIDE 10 MG/1
10 TABLET, FILM COATED ORAL PRN
COMMUNITY
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
## 2025-06-05 ASSESSMENT — ENCOUNTER SYMPTOMS
COLOR CHANGE: 0
TROUBLE SWALLOWING: 0
NAUSEA: 0
CONSTIPATION: 0
COUGH: 0
BLOOD IN STOOL: 0
VOMITING: 0
SHORTNESS OF BREATH: 0
ABDOMINAL PAIN: 0
EYE DISCHARGE: 0
SINUS PRESSURE: 0
DIARRHEA: 0
BACK PAIN: 0
SORE THROAT: 0
FACIAL SWELLING: 0
SINUS PAIN: 0
WHEEZING: 0
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
## 2025-06-13 NOTE — PROGRESS NOTES
Requested return of patient information ASAP to complete chart for surgery from facility. Spoke to nurse Robbins at [facility] and informed her that Jardiance is a 3 day hold not including the day of surgery and that facility will need to check with surgeon re: pre-op eliquis instructions. Verbalized understanding and re-faxed information request to facility.

## 2025-06-16 RX ORDER — ACETAMINOPHEN 325 MG/1
650 TABLET ORAL EVERY 6 HOURS PRN
COMMUNITY

## 2025-06-16 RX ORDER — GINSENG 100 MG
CAPSULE ORAL DAILY
COMMUNITY
End: 2025-06-16

## 2025-06-16 NOTE — PROGRESS NOTES
Requested information again from facility to complete chart. Spoke to nurse Burns. Per nursing last dose of eliquis and jardiance was 6/13/25.

## 2025-06-16 NOTE — PROGRESS NOTES
Patient is a resident at [facility] telephone assessment completed with nurse Corrigan.      A&O:  yes  Code Status:  full  Ambulatory:  no, mechanical life  Oxygen- no    Hard of Hearing:  no  Call light:  yes  Signs Documents?  yes  POA:  no documents sent  Transport:  [employer] [phone]  Wounds:  no  Isolation:  no  Lines/Drains/Implanted devices:  no  Trach: no    Per facility patients son and niece aware of surgery date.    Verified arrival time of 1045 with facility.  General instructions and medication instructions faxed to facility.

## 2025-06-16 NOTE — PROGRESS NOTES
Message left with Saloni at [facility]. Requested patient information to get chart ready for surgery tomorrow. Requested call back from nurse.

## 2025-06-16 NOTE — PROGRESS NOTES
[facility] PRE-ADMISSION TESTING INSTRUCTIONS    The Preadmission Testing patient is instructed accordingly using the following criteria (check applicable):    ARRIVAL INSTRUCTIONS:   Arrival Time: 1045    [x] Parking the day of Surgery is located in the Main Entrance lot.  Upon entering through the main entrance (Entrance A) make an immediate right to the surgery reception desk    [x] Bring photo ID and insurance card    [x] Bring in a copy of Living will or Durable Power of  papers.    [x] Please be sure to arrange for a responsible adult to provide transportation to and from the hospital    [x] Please arrange for a responsible adult to be with you for the 24 hour period post procedure, due to having anesthesia    [x] Please notify surgeon if you develop any illness between now and time of surgery (cold, cough, sore throat, fever, nausea, vomiting) or any signs of infections  including skin, wounds, and dental.    [x] If you awake am of surgery not feeling well or have temperature >100 please call [phone].    GENERAL INSTRUCTIONS:    [x] No solid foods after midnight, may have up to 8oz of water until 4 hours prior to surgery. No gum, no candy, no mints.  NPO time: 0900       [x] You may brush your teeth    [x] Take medications as instructed (Read back and verified by patient)   Take see separate sheet    [] Bring inhalers day of surgery    [x] Stop herbal supplements and vitamins 5 days prior to procedure    [x] Follow preop dosing of blood thinners per physician instructions    [] Take 1/2 dose of evening insulin, but no insulin after midnight    [x] No oral diabetic medications after midnight    [x] If diabetic and have low blood sugar or feel symptomatic, take 1-2oz apple juice only    [] Bring urine specimen day of surgery    [x] Shower or bath with soap, lather and rinse well, AM of Surgery, no lotion, powders or creams to surgical site    [x] Please do not wear any
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
## 2025-06-17 ENCOUNTER — HOSPITAL ENCOUNTER (OUTPATIENT)
Age: 50
Setting detail: OUTPATIENT SURGERY
Discharge: NURSING, REHAB OR LONG-TERM CARE | End: 2025-06-17
Attending: ORTHOPAEDIC SURGERY | Admitting: ORTHOPAEDIC SURGERY
Payer: MEDICARE

## 2025-06-17 ENCOUNTER — ANESTHESIA EVENT (OUTPATIENT)
Dept: OPERATING ROOM | Age: 50
End: 2025-06-17
Payer: MEDICARE

## 2025-06-17 ENCOUNTER — ANESTHESIA (OUTPATIENT)
Dept: OPERATING ROOM | Age: 50
End: 2025-06-17
Payer: MEDICARE

## 2025-06-17 VITALS
TEMPERATURE: 98.6 F | OXYGEN SATURATION: 94 % | SYSTOLIC BLOOD PRESSURE: 143 MMHG | HEIGHT: 73 IN | BODY MASS INDEX: 31.94 KG/M2 | RESPIRATION RATE: 14 BRPM | WEIGHT: 241 LBS | HEART RATE: 117 BPM | DIASTOLIC BLOOD PRESSURE: 68 MMHG

## 2025-06-17 LAB — GLUCOSE BLD-MCNC: 133 MG/DL (ref 74–99)

## 2025-06-17 PROCEDURE — 3700000001 HC ADD 15 MINUTES (ANESTHESIA): Performed by: ORTHOPAEDIC SURGERY

## 2025-06-17 PROCEDURE — 82962 GLUCOSE BLOOD TEST: CPT

## 2025-06-17 PROCEDURE — 6360000002 HC RX W HCPCS: Performed by: ORTHOPAEDIC SURGERY

## 2025-06-17 PROCEDURE — 7100000011 HC PHASE II RECOVERY - ADDTL 15 MIN: Performed by: ORTHOPAEDIC SURGERY

## 2025-06-17 PROCEDURE — 6360000002 HC RX W HCPCS: Performed by: NURSE ANESTHETIST, CERTIFIED REGISTERED

## 2025-06-17 PROCEDURE — 7100000010 HC PHASE II RECOVERY - FIRST 15 MIN: Performed by: ORTHOPAEDIC SURGERY

## 2025-06-17 PROCEDURE — 7100000000 HC PACU RECOVERY - FIRST 15 MIN: Performed by: ORTHOPAEDIC SURGERY

## 2025-06-17 PROCEDURE — 2709999900 HC NON-CHARGEABLE SUPPLY: Performed by: ORTHOPAEDIC SURGERY

## 2025-06-17 PROCEDURE — 3600000012 HC SURGERY LEVEL 2 ADDTL 15MIN: Performed by: ORTHOPAEDIC SURGERY

## 2025-06-17 PROCEDURE — 2580000003 HC RX 258: Performed by: ORTHOPAEDIC SURGERY

## 2025-06-17 PROCEDURE — 7100000001 HC PACU RECOVERY - ADDTL 15 MIN: Performed by: ORTHOPAEDIC SURGERY

## 2025-06-17 PROCEDURE — 3700000000 HC ANESTHESIA ATTENDED CARE: Performed by: ORTHOPAEDIC SURGERY

## 2025-06-17 PROCEDURE — 2720000010 HC SURG SUPPLY STERILE: Performed by: ORTHOPAEDIC SURGERY

## 2025-06-17 PROCEDURE — 2500000003 HC RX 250 WO HCPCS: Performed by: ORTHOPAEDIC SURGERY

## 2025-06-17 PROCEDURE — 3600000002 HC SURGERY LEVEL 2 BASE: Performed by: ORTHOPAEDIC SURGERY

## 2025-06-17 PROCEDURE — 2500000003 HC RX 250 WO HCPCS: Performed by: NURSE ANESTHETIST, CERTIFIED REGISTERED

## 2025-06-17 RX ORDER — FENTANYL CITRATE 50 UG/ML
INJECTION, SOLUTION INTRAMUSCULAR; INTRAVENOUS
Status: DISCONTINUED | OUTPATIENT
Start: 2025-06-17 | End: 2025-06-17 | Stop reason: SDUPTHER

## 2025-06-17 RX ORDER — DEXAMETHASONE SODIUM PHOSPHATE 10 MG/ML
4 INJECTION, SOLUTION INTRAMUSCULAR; INTRAVENOUS ONCE
Status: DISCONTINUED | OUTPATIENT
Start: 2025-06-17 | End: 2025-06-18 | Stop reason: HOSPADM

## 2025-06-17 RX ORDER — PROPOFOL 10 MG/ML
INJECTION, EMULSION INTRAVENOUS
Status: DISCONTINUED | OUTPATIENT
Start: 2025-06-17 | End: 2025-06-17 | Stop reason: SDUPTHER

## 2025-06-17 RX ORDER — SODIUM CHLORIDE 9 MG/ML
INJECTION, SOLUTION INTRAVENOUS PRN
Status: DISCONTINUED | OUTPATIENT
Start: 2025-06-17 | End: 2025-06-18 | Stop reason: HOSPADM

## 2025-06-17 RX ORDER — MIDAZOLAM HYDROCHLORIDE 1 MG/ML
INJECTION, SOLUTION INTRAMUSCULAR; INTRAVENOUS
Status: DISCONTINUED | OUTPATIENT
Start: 2025-06-17 | End: 2025-06-17 | Stop reason: SDUPTHER

## 2025-06-17 RX ORDER — SODIUM CHLORIDE 9 MG/ML
INJECTION, SOLUTION INTRAVENOUS CONTINUOUS
Status: DISCONTINUED | OUTPATIENT
Start: 2025-06-17 | End: 2025-06-18 | Stop reason: HOSPADM

## 2025-06-17 RX ORDER — MIDAZOLAM HYDROCHLORIDE 2 MG/2ML
1 INJECTION, SOLUTION INTRAMUSCULAR; INTRAVENOUS EVERY 5 MIN PRN
Status: DISCONTINUED | OUTPATIENT
Start: 2025-06-17 | End: 2025-06-18 | Stop reason: HOSPADM

## 2025-06-17 RX ORDER — SODIUM CHLORIDE 0.9 % (FLUSH) 0.9 %
5-40 SYRINGE (ML) INJECTION PRN
Status: DISCONTINUED | OUTPATIENT
Start: 2025-06-17 | End: 2025-06-18 | Stop reason: HOSPADM

## 2025-06-17 RX ORDER — LIDOCAINE HYDROCHLORIDE 10 MG/ML
10 INJECTION, SOLUTION INFILTRATION; PERINEURAL
Status: DISCONTINUED | OUTPATIENT
Start: 2025-06-17 | End: 2025-06-18 | Stop reason: HOSPADM

## 2025-06-17 RX ORDER — ROPIVACAINE HYDROCHLORIDE 5 MG/ML
15 INJECTION, SOLUTION EPIDURAL; INFILTRATION; PERINEURAL
Status: DISCONTINUED | OUTPATIENT
Start: 2025-06-17 | End: 2025-06-18 | Stop reason: HOSPADM

## 2025-06-17 RX ORDER — ONDANSETRON 2 MG/ML
INJECTION INTRAMUSCULAR; INTRAVENOUS
Status: DISCONTINUED | OUTPATIENT
Start: 2025-06-17 | End: 2025-06-17 | Stop reason: SDUPTHER

## 2025-06-17 RX ORDER — SODIUM CHLORIDE 0.9 % (FLUSH) 0.9 %
5-40 SYRINGE (ML) INJECTION EVERY 12 HOURS SCHEDULED
Status: DISCONTINUED | OUTPATIENT
Start: 2025-06-17 | End: 2025-06-18 | Stop reason: HOSPADM

## 2025-06-17 RX ADMIN — FENTANYL CITRATE 100 MCG: 50 INJECTION, SOLUTION INTRAMUSCULAR; INTRAVENOUS at 12:15

## 2025-06-17 RX ADMIN — MIDAZOLAM 2 MG: 1 INJECTION INTRAMUSCULAR; INTRAVENOUS at 12:10

## 2025-06-17 RX ADMIN — Medication 50 MG: at 12:15

## 2025-06-17 RX ADMIN — WATER 2000 MG: 1 INJECTION INTRAMUSCULAR; INTRAVENOUS; SUBCUTANEOUS at 12:20

## 2025-06-17 RX ADMIN — ONDANSETRON 4 MG: 2 INJECTION INTRAMUSCULAR; INTRAVENOUS at 13:14

## 2025-06-17 RX ADMIN — PROPOFOL 200 MG: 10 INJECTION, EMULSION INTRAVENOUS at 12:15

## 2025-06-17 RX ADMIN — SODIUM CHLORIDE: 9 INJECTION, SOLUTION INTRAVENOUS at 11:53

## 2025-06-17 ASSESSMENT — PAIN SCALES - GENERAL
PAINLEVEL_OUTOF10: 0

## 2025-06-17 ASSESSMENT — PAIN - FUNCTIONAL ASSESSMENT
PAIN_FUNCTIONAL_ASSESSMENT: 0-10
PAIN_FUNCTIONAL_ASSESSMENT: PREVENTS OR INTERFERES SOME ACTIVE ACTIVITIES AND ADLS

## 2025-06-17 ASSESSMENT — PAIN DESCRIPTION - DESCRIPTORS: DESCRIPTORS: NUMBNESS

## 2025-06-17 NOTE — PROGRESS NOTES
Attempted to call [facility] for transport back to facility immediately placed on hold then had to leave message    Message left for [facility] to call back to arrange transport back to facility    1528 [facility] called they are unable to transport patient back to facility    [facility] was called previously transport time is 1630    Nurse to nurse called to Meenu at [facility]    [facility] called to advise a  time of 7PM    Pt was offered snacks and something to drink he declined

## 2025-06-17 NOTE — OP NOTE
[place] [address]                            OPERATIVE REPORT      PATIENT NAME: MAYANK ANSARI              : 1975  MED REC NO: [id number]                        ROOM: [facility]  ACCOUNT NO: [id number]                       ADMIT DATE: 2025  PROVIDER: Rafael Foster      DATE OF PROCEDURE:  2025    SURGEON:  Rafael Foster    PREOPERATIVE DIAGNOSES:  Right carpal tunnel syndrome, right cubital tunnel syndrome.    POSTOPERATIVE DIAGNOSES:  Right carpal tunnel syndrome, right cubital tunnel syndrome.    PROCEDURES PERFORMED:  Right endoscopic carpal tunnel release and right cubital tunnel release with subcutaneous ulnar nerve transposition.    COMPLICATIONS:  None.    ANTIBIOTICS:  See record.    ESTIMATED BLOOD LOSS:  Minimal.    ANESTHESIA:  General plus local.    TOURNIQUET TIME:  Approximately 30 minutes.    INDICATIONS:  This is a 49-year-old male who has right carpal tunnel syndrome and cubital tunnel syndrome based on official physical and electrodiagnostic studies.  After discussing risks and benefits including nonoperative treatment, the patient wished to undergo procedures listed above.    DESCRIPTION OF PROCEDURE:  The patient was brought to the operating room on a cart.  Hand table was placed.  Tourniquet was placed on the right upper arm.  He underwent general anesthesia.  He was prepped and draped in sterile fashion using Betadine.  A time-out was performed.  Skin was incised with a knife about the volar aspect of the wrist.  Subcutaneous tissue was dissected with tenotomy scissors.  Palmaris longus was identified.  Artis in the forearm fascia was made.  Distal based flap was created.  Proximal aspect of the forearm fascia was released.  Attention was turned to the carpal tunnel.  This was entered with a spatula and the undersurface of the ligament was roughened.  Dilators were placed.

## 2025-06-17 NOTE — ANESTHESIA PRE PROCEDURE
PROTIME 38.5 12/22/2023 11:26 AM    INR 2.1 01/06/2025 07:47 AM    APTT 35.1 10/24/2024 06:17 PM    APTT 66.2 09/09/2021 02:20 PM       HCG (If Applicable): No results found for: \"PREGTESTUR\", \"PREGSERUM\", \"HCG\", \"HCGQUANT\"     ABGs: No results found for: \"PHART\", \"PO2ART\", \"TWA3AJN\", \"ZDU2KMS\", \"BEART\", \"E4TACFSK\"     Type & Screen (If Applicable):  Lab Results   Component Value Date    ABORH O NEGATIVE 11/08/2024    LABANTI NEGATIVE 11/08/2024       Drug/Infectious Status (If Applicable):  Lab Results   Component Value Date/Time    HEPCAB NONREACTIVE 10/25/2024 11:05 AM       COVID-19 Screening (If Applicable):   Lab Results   Component Value Date/Time    COVID19 Not Detected 02/04/2023 06:09 PM    COVID19 Not Detected 10/14/2022 08:42 PM           Anesthesia Evaluation  Patient summary reviewed   no history of anesthetic complications:   Airway: Mallampati: II          Dental:          Pulmonary:   (+)       decreased breath sounds                              ROS comment: Former smoker   Cardiovascular:    (+) hypertension:, valvular problems/murmurs: AS, past MI: > 6 months, pulmonary hypertension: moderate      ECG reviewed  Rhythm: regular  Rate: abnormal  Echocardiogram reviewed         Beta Blocker:  Not on Beta Blocker      ROS comment: Cardiac arrest     Neuro/Psych:   (+) depression/anxiety             GI/Hepatic/Renal:   (+) GERD:          Endo/Other:    (+) DiabetesType II DM, using insulin, blood dyscrasia: anemia and anticoagulation therapy:., electrolyte abnormalities (Lactic Acid 9.8).                  ROS comment: Obese  Abdominal:             Vascular:   + DVT.       Other Findings:         Anesthesia Plan      general     ASA 4     (#5 LMA  Ketamine 40 mg  Toradol 15 mg)  Induction: intravenous.    MIPS: Postoperative opioids intended and Prophylactic antiemetics administered.  Anesthetic plan and risks discussed with patient (Patients jailyn Dumont gave consent by phone 10/30/24).      Plan

## 2025-06-17 NOTE — DISCHARGE INSTRUCTIONS
Pain medication is - patient is already on pain meds.    Weightbearing status is limited to right arm.    Do NOT take off splint on elbow.  Keep splint clean and dry.    Keep dressing clean and dry for 2 days on hand postop.  Then, OK to take off dressing and to get wound wet with clean water (shower).  Do not get wound wet with dirty water (dishwater).    Do not tub soak wound for 1 month (pool, bath).    Follow up with Dr. Foster in 2 weeks.

## 2025-06-18 NOTE — ANESTHESIA POSTPROCEDURE EVALUATION
Department of Anesthesiology  Postprocedure Note    Patient: Fausto Del Angel II  MRN: [id number]  YOB: 1975  Date of evaluation: 6/18/2025    Procedure Summary       Date: 06/17/25 Room / Location: [address]    Anesthesia Start: 1159 Anesthesia Stop: 1329    Procedures:       RIGHT ENDOSCOPIC CARPAL TUNNEL RELEASE (Right: Hand)      CUBITAL TUNNEL RELEASE WITH TRANSPOSITION                  ++PNB++       ++FACILITY++ (Right: Elbow) Diagnosis:       Carpal tunnel syndrome on right      Cubital tunnel syndrome on right      (Carpal tunnel syndrome on right [G56.01])      (Cubital tunnel syndrome on right [G56.21])    Surgeons: Rafael Foster MD Responsible Provider: Chad Gibbs MD    Anesthesia Type: general ASA Status: 4            Anesthesia Type: No value filed.    Cristal Phase I: Cristal Score: 9    Cristal Phase II: Cristal Score: 10    Anesthesia Post Evaluation    Patient location during evaluation: PACU  Patient participation: complete - patient participated  Level of consciousness: awake and alert  Airway patency: patent  Nausea & Vomiting: no nausea and no vomiting  Cardiovascular status: hemodynamically stable  Respiratory status: acceptable  Hydration status: euvolemic  Multimodal analgesia pain management approach  Pain management: adequate    No notable events documented.

## 2025-06-23 ENCOUNTER — OUTSIDE SERVICES (OUTPATIENT)
Dept: PRIMARY CARE CLINIC | Age: 50
End: 2025-06-23

## 2025-06-23 DIAGNOSIS — E11.65 TYPE 2 DIABETES MELLITUS WITH HYPERGLYCEMIA, WITHOUT LONG-TERM CURRENT USE OF INSULIN (HCC): ICD-10-CM

## 2025-06-23 DIAGNOSIS — M62.81 MUSCLE WEAKNESS (GENERALIZED): ICD-10-CM

## 2025-06-23 DIAGNOSIS — R60.0 BILATERAL LEG EDEMA: ICD-10-CM

## 2025-06-23 DIAGNOSIS — S32.810D MULTIPLE FRACTURES OF PELVIS WITH STABLE DISRUPTION OF PELVIC RING, SUBSEQUENT ENCOUNTER FOR FRACTURE WITH ROUTINE HEALING: Primary | ICD-10-CM

## 2025-06-23 DIAGNOSIS — S73.005D DISLOCATION OF LEFT HIP, SUBSEQUENT ENCOUNTER: ICD-10-CM

## 2025-06-23 DIAGNOSIS — K21.9 GASTRO-ESOPHAGEAL REFLUX DISEASE WITHOUT ESOPHAGITIS: ICD-10-CM

## 2025-06-23 DIAGNOSIS — I10 ESSENTIAL (PRIMARY) HYPERTENSION: ICD-10-CM

## 2025-06-23 DIAGNOSIS — Z86.711 PERSONAL HISTORY OF PULMONARY EMBOLISM: ICD-10-CM

## 2025-06-25 ASSESSMENT — ENCOUNTER SYMPTOMS
SHORTNESS OF BREATH: 0
COLOR CHANGE: 0
EYE DISCHARGE: 0
COUGH: 0
BACK PAIN: 0
VOMITING: 0
SINUS PRESSURE: 0
NAUSEA: 0
BLOOD IN STOOL: 0
SORE THROAT: 0
WHEEZING: 0
ABDOMINAL PAIN: 0
TROUBLE SWALLOWING: 0
FACIAL SWELLING: 0
CONSTIPATION: 0
DIARRHEA: 0
SINUS PAIN: 0
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
## 2025-07-01 ENCOUNTER — OUTSIDE SERVICES (OUTPATIENT)
Dept: PRIMARY CARE CLINIC | Age: 50
End: 2025-07-01

## 2025-07-01 DIAGNOSIS — R60.0 BILATERAL LEG EDEMA: ICD-10-CM

## 2025-07-01 DIAGNOSIS — M62.81 MUSCLE WEAKNESS (GENERALIZED): ICD-10-CM

## 2025-07-01 DIAGNOSIS — E11.65 TYPE 2 DIABETES MELLITUS WITH HYPERGLYCEMIA, WITHOUT LONG-TERM CURRENT USE OF INSULIN (HCC): ICD-10-CM

## 2025-07-01 DIAGNOSIS — K21.9 GASTRO-ESOPHAGEAL REFLUX DISEASE WITHOUT ESOPHAGITIS: ICD-10-CM

## 2025-07-01 DIAGNOSIS — S73.005D DISLOCATION OF LEFT HIP, SUBSEQUENT ENCOUNTER: ICD-10-CM

## 2025-07-01 DIAGNOSIS — I10 ESSENTIAL (PRIMARY) HYPERTENSION: ICD-10-CM

## 2025-07-01 DIAGNOSIS — B45.0 PULMONARY CRYPTOCOCCOSIS (HCC): ICD-10-CM

## 2025-07-01 DIAGNOSIS — J96.01 ACUTE RESPIRATORY FAILURE WITH HYPOXIA (HCC): ICD-10-CM

## 2025-07-01 DIAGNOSIS — Z86.711 PERSONAL HISTORY OF PULMONARY EMBOLISM: ICD-10-CM

## 2025-07-01 DIAGNOSIS — S32.810D MULTIPLE FRACTURES OF PELVIS WITH STABLE DISRUPTION OF PELVIC RING, SUBSEQUENT ENCOUNTER FOR FRACTURE WITH ROUTINE HEALING: Primary | ICD-10-CM

## 2025-07-02 ASSESSMENT — ENCOUNTER SYMPTOMS
SHORTNESS OF BREATH: 0
SORE THROAT: 0
ABDOMINAL PAIN: 0
BACK PAIN: 0
CONSTIPATION: 0
EYE DISCHARGE: 0
COLOR CHANGE: 0
COUGH: 0
NAUSEA: 0
BLOOD IN STOOL: 0
TROUBLE SWALLOWING: 0
SINUS PAIN: 0
DIARRHEA: 0
FACIAL SWELLING: 0
VOMITING: 0
WHEEZING: 0
SINUS PRESSURE: 0

## 2025-07-17 ENCOUNTER — APPOINTMENT (OUTPATIENT)
Dept: ULTRASOUND IMAGING | Age: 50
DRG: 917 | End: 2025-07-17
Payer: MEDICARE

## 2025-07-17 ENCOUNTER — APPOINTMENT (OUTPATIENT)
Dept: CT IMAGING | Age: 50
DRG: 917 | End: 2025-07-17
Payer: MEDICARE

## 2025-07-17 ENCOUNTER — APPOINTMENT (OUTPATIENT)
Dept: GENERAL RADIOLOGY | Age: 50
DRG: 917 | End: 2025-07-17
Payer: MEDICARE

## 2025-07-17 ENCOUNTER — HOSPITAL ENCOUNTER (INPATIENT)
Age: 50
LOS: 6 days | Discharge: HOME | DRG: 917 | End: 2025-07-23
Attending: EMERGENCY MEDICINE | Admitting: STUDENT IN AN ORGANIZED HEALTH CARE EDUCATION/TRAINING PROGRAM
Payer: MEDICARE

## 2025-07-17 DIAGNOSIS — E87.6 HYPOKALEMIA: ICD-10-CM

## 2025-07-17 DIAGNOSIS — R41.89 UNRESPONSIVE STATE: Primary | ICD-10-CM

## 2025-07-17 DIAGNOSIS — F19.10 POLYSUBSTANCE ABUSE (HCC): ICD-10-CM

## 2025-07-17 PROBLEM — E53.8 B12 DEFICIENCY: Status: ACTIVE | Noted: 2025-07-17

## 2025-07-17 PROBLEM — J96.01 ACUTE HYPOXIC RESPIRATORY FAILURE (HCC): Status: ACTIVE | Noted: 2025-07-17

## 2025-07-17 PROBLEM — E11.65 TYPE 2 DIABETES MELLITUS WITH HYPERGLYCEMIA, WITH LONG-TERM CURRENT USE OF INSULIN (HCC): Status: ACTIVE | Noted: 2025-07-17

## 2025-07-17 PROBLEM — T50.901A ACCIDENTAL DRUG OVERDOSE: Status: ACTIVE | Noted: 2025-07-17

## 2025-07-17 PROBLEM — Z79.4 TYPE 2 DIABETES MELLITUS WITH HYPERGLYCEMIA, WITH LONG-TERM CURRENT USE OF INSULIN (HCC): Status: ACTIVE | Noted: 2025-07-17

## 2025-07-17 LAB
25(OH)D3 SERPL-MCNC: 14.9 NG/ML (ref 30–100)
AADO2: 190.2 MMHG
ALBUMIN SERPL-MCNC: 3.3 G/DL (ref 3.5–5.2)
ALBUMIN SERPL-MCNC: 4.1 G/DL (ref 3.5–5.2)
ALLEN TEST: POSITIVE
ALP SERPL-CCNC: 193 U/L (ref 40–129)
ALP SERPL-CCNC: 254 U/L (ref 40–129)
ALT SERPL-CCNC: 51 U/L (ref 0–50)
ALT SERPL-CCNC: 66 U/L (ref 0–50)
AMMONIA PLAS-SCNC: 16 UMOL/L (ref 16–60)
AMPHET UR QL SCN: NEGATIVE
ANION GAP SERPL CALCULATED.3IONS-SCNC: 12 MMOL/L (ref 7–16)
ANION GAP SERPL CALCULATED.3IONS-SCNC: 18 MMOL/L (ref 7–16)
AST SERPL-CCNC: 63 U/L (ref 0–50)
AST SERPL-CCNC: 65 U/L (ref 0–50)
B.E.: -8.2 MMOL/L (ref -3–3)
B.E.: -8.6 MMOL/L (ref -3–3)
BARBITURATES UR QL SCN: NEGATIVE
BASOPHILS # BLD: 0.07 K/UL (ref 0–0.2)
BASOPHILS # BLD: 0.34 K/UL (ref 0–0.2)
BASOPHILS NFR BLD: 0 % (ref 0–2)
BASOPHILS NFR BLD: 3 % (ref 0–2)
BENZODIAZ UR QL: NEGATIVE
BILIRUB SERPL-MCNC: 0.2 MG/DL (ref 0–1.2)
BILIRUB SERPL-MCNC: 0.2 MG/DL (ref 0–1.2)
BILIRUB UR QL STRIP: NEGATIVE
BUN SERPL-MCNC: 22 MG/DL (ref 6–20)
BUN SERPL-MCNC: 23 MG/DL (ref 6–20)
BUPRENORPHINE UR QL: NEGATIVE
CALCIUM SERPL-MCNC: 10 MG/DL (ref 8.6–10)
CALCIUM SERPL-MCNC: 8.4 MG/DL (ref 8.6–10)
CANNABINOIDS UR QL SCN: NEGATIVE
CASTS #/AREA URNS LPF: ABNORMAL /LPF
CHLORIDE SERPL-SCNC: 106 MMOL/L (ref 98–107)
CHLORIDE SERPL-SCNC: 99 MMOL/L (ref 98–107)
CHP ED QC CHECK: YES
CLARITY UR: CLEAR
CO2 SERPL-SCNC: 19 MMOL/L (ref 22–29)
CO2 SERPL-SCNC: 20 MMOL/L (ref 22–29)
COCAINE UR QL SCN: POSITIVE
COHB: 0.8 % (ref 0–1.5)
COHB: 1.3 % (ref 0–1.5)
COLOR UR: YELLOW
COMMENT: ABNORMAL
CREAT SERPL-MCNC: 0.9 MG/DL (ref 0.7–1.2)
CREAT SERPL-MCNC: 1 MG/DL (ref 0.7–1.2)
CRITICAL: ABNORMAL
CRITICAL: ABNORMAL
CRP SERPL HS-MCNC: 14.4 MG/L (ref 0–5)
DATE ANALYZED: ABNORMAL
DATE ANALYZED: ABNORMAL
DATE OF COLLECTION: ABNORMAL
DATE OF COLLECTION: ABNORMAL
EOSINOPHIL # BLD: 0.11 K/UL (ref 0.05–0.5)
EOSINOPHIL # BLD: 0.15 K/UL (ref 0.05–0.5)
EOSINOPHILS RELATIVE PERCENT: 1 % (ref 0–6)
EOSINOPHILS RELATIVE PERCENT: 1 % (ref 0–6)
ERYTHROCYTE [DISTWIDTH] IN BLOOD BY AUTOMATED COUNT: 14.7 % (ref 11.5–15)
ERYTHROCYTE [DISTWIDTH] IN BLOOD BY AUTOMATED COUNT: 14.8 % (ref 11.5–15)
ERYTHROCYTE [SEDIMENTATION RATE] IN BLOOD BY WESTERGREN METHOD: 3 MM/HR (ref 0–15)
ETHANOLAMINE SERPL-MCNC: <10 MG/DL (ref 0–0.08)
FENTANYL UR QL: POSITIVE
FIO2: 50
FIO2: 50 %
FOLATE SERPL-MCNC: 13.5 NG/ML (ref 4.6–34.8)
GFR, ESTIMATED: 87 ML/MIN/1.73M2
GFR, ESTIMATED: >90 ML/MIN/1.73M2
GLUCOSE BLD-MCNC: 132 MG/DL (ref 74–99)
GLUCOSE BLD-MCNC: 144 MG/DL (ref 74–99)
GLUCOSE BLD-MCNC: 160 MG/DL (ref 74–99)
GLUCOSE BLD-MCNC: 185 MG/DL (ref 74–99)
GLUCOSE BLD-MCNC: 200 MG/DL
GLUCOSE SERPL-MCNC: 151 MG/DL (ref 74–99)
GLUCOSE SERPL-MCNC: 221 MG/DL (ref 74–99)
GLUCOSE UR STRIP-MCNC: >=1000 MG/DL
HCO3: 17.7 MMOL/L (ref 22–26)
HCO3: 20 MMOL/L (ref 22–26)
HCT VFR BLD AUTO: 44.7 % (ref 37–54)
HCT VFR BLD AUTO: 51.2 % (ref 37–54)
HGB BLD-MCNC: 14.8 G/DL (ref 12.5–16.5)
HGB BLD-MCNC: 17.2 G/DL (ref 12.5–16.5)
HGB UR QL STRIP.AUTO: ABNORMAL
HHB: 2.2 % (ref 0–5)
HHB: 4.2 % (ref 0–5)
IMM GRANULOCYTES # BLD AUTO: 0.09 K/UL (ref 0–0.58)
IMM GRANULOCYTES NFR BLD: 1 % (ref 0–5)
KETONES UR STRIP-MCNC: NEGATIVE MG/DL
LAB: ABNORMAL
LAB: ABNORMAL
LACTATE BLDV-SCNC: 1.9 MMOL/L (ref 0.5–2.2)
LACTATE BLDV-SCNC: 2.4 MMOL/L (ref 0.5–2.2)
LACTATE BLDV-SCNC: 2.5 MMOL/L (ref 0.5–1.9)
LACTATE BLDV-SCNC: 5.2 MMOL/L (ref 0.5–1.9)
LEUKOCYTE ESTERASE UR QL STRIP: NEGATIVE
LYMPHOCYTES NFR BLD: 2.12 K/UL (ref 1.5–4)
LYMPHOCYTES NFR BLD: 6.18 K/UL (ref 1.5–4)
LYMPHOCYTES RELATIVE PERCENT: 13 % (ref 20–42)
LYMPHOCYTES RELATIVE PERCENT: 46 % (ref 20–42)
Lab: 1928
Lab: 2205
MAGNESIUM SERPL-MCNC: 1.5 MG/DL (ref 1.6–2.6)
MAGNESIUM SERPL-MCNC: 1.7 MG/DL (ref 1.6–2.6)
MCH RBC QN AUTO: 32 PG (ref 26–35)
MCH RBC QN AUTO: 32.2 PG (ref 26–35)
MCHC RBC AUTO-ENTMCNC: 33.1 G/DL (ref 32–34.5)
MCHC RBC AUTO-ENTMCNC: 33.6 G/DL (ref 32–34.5)
MCV RBC AUTO: 95.2 FL (ref 80–99.9)
MCV RBC AUTO: 97.2 FL (ref 80–99.9)
METHADONE UR QL: NEGATIVE
METHB: 0.3 % (ref 0–1.5)
METHB: 0.3 % (ref 0–1.5)
MODE: ABNORMAL
MODE: ABNORMAL
MODE: AC
MONOCYTES NFR BLD: 0.92 K/UL (ref 0.1–0.95)
MONOCYTES NFR BLD: 1.07 K/UL (ref 0.1–0.95)
MONOCYTES NFR BLD: 7 % (ref 2–12)
MONOCYTES NFR BLD: 7 % (ref 2–12)
NEGATIVE BASE EXCESS, ART: 2.2 MMOL/L
NEUTROPHILS NFR BLD: 44 % (ref 43–80)
NEUTROPHILS NFR BLD: 78 % (ref 43–80)
NEUTS SEG NFR BLD: 12.46 K/UL (ref 1.8–7.3)
NEUTS SEG NFR BLD: 5.84 K/UL (ref 1.8–7.3)
NITRITE UR QL STRIP: NEGATIVE
O2 CONTENT: 20 ML/DL
O2 CONTENT: 20.7 ML/DL
O2 DELIVERY DEVICE: ABNORMAL
O2 SATURATION: 95.7 % (ref 92–98.5)
O2 SATURATION: 97.8 % (ref 92–98.5)
O2HB: 94.2 % (ref 94–97)
O2HB: 96.7 % (ref 94–97)
OPERATOR ID: ABNORMAL
OPERATOR ID: ABNORMAL
OPIATES UR QL SCN: NEGATIVE
OXYCODONE UR QL SCN: POSITIVE
PATIENT TEMP: 37
PATIENT TEMP: 37 C
PATIENT TEMP: 37 C
PCO2: 38 MMHG (ref 35–45)
PCO2: 53.2 MMHG (ref 35–45)
PCP UR QL SCN: NEGATIVE
PEEP/CPAP: 6 CMH2O
PEEP: 8
PFO2: 2.22 MMHG/%
PH BLOOD GAS: 7.19 (ref 7.35–7.45)
PH BLOOD GAS: 7.29 (ref 7.35–7.45)
PH UR STRIP: 5.5 [PH] (ref 5–8)
PIP: 16 CMH2O
PLATELET # BLD AUTO: 410 K/UL (ref 130–450)
PLATELET # BLD AUTO: 508 K/UL (ref 130–450)
PMV BLD AUTO: 9.4 FL (ref 7–12)
PMV BLD AUTO: 9.9 FL (ref 7–12)
PO2: 111.1 MMHG (ref 75–100)
PO2: 87.2 MMHG (ref 75–100)
POC HCO3: 23.6 MMOL/L (ref 22–26)
POC O2 SATURATION: 98.3 % (ref 92–98.5)
POC PCO2 TEMP: 43.1 MM HG
POC PCO2: 43.1 MM HG (ref 35–45)
POC PH TEMP: 7.35
POC PH: 7.35 (ref 7.35–7.45)
POC PO2 TEMP: 115.7 MM HG
POC PO2: 115.7 MM HG (ref 80–100)
POTASSIUM SERPL-SCNC: 3.2 MMOL/L (ref 3.5–5.1)
POTASSIUM SERPL-SCNC: 4.5 MMOL/L (ref 3.5–5.1)
PROCALCITONIN SERPL-MCNC: 0.11 NG/ML (ref 0–0.08)
PROCALCITONIN SERPL-MCNC: 0.15 NG/ML (ref 0–0.08)
PROCALCITONIN SERPL-MCNC: 0.2 NG/ML (ref 0–0.08)
PROT SERPL-MCNC: 6.2 G/DL (ref 6.4–8.3)
PROT SERPL-MCNC: 7.6 G/DL (ref 6.4–8.3)
PROT UR STRIP-MCNC: 100 MG/DL
PS: 10 CMH20
RBC # BLD AUTO: 4.6 M/UL (ref 3.8–5.8)
RBC # BLD AUTO: 5.38 M/UL (ref 3.8–5.8)
RBC # BLD: ABNORMAL 10*6/UL
RBC #/AREA URNS HPF: ABNORMAL /HPF
RI(T): 1.71
RR MECHANICAL: 18 B/MIN
SAMPLE SITE: ABNORMAL
SET RATE, POC: 20
SODIUM SERPL-SCNC: 137 MMOL/L (ref 136–145)
SODIUM SERPL-SCNC: 137 MMOL/L (ref 136–145)
SOURCE, BLOOD GAS: ABNORMAL
SOURCE, BLOOD GAS: ABNORMAL
SP GR UR STRIP: >1.03 (ref 1–1.03)
T4 FREE SERPL-MCNC: 1.3 NG/DL (ref 0.9–1.7)
TEST INFORMATION: ABNORMAL
THB: 14.6 G/DL (ref 11.5–16.5)
THB: 15.6 G/DL (ref 11.5–16.5)
TIDAL VOLUME: 450
TIME ANALYZED: 1931
TIME ANALYZED: 2215
TROPONIN I SERPL HS-MCNC: 26 NG/L (ref 0–22)
TROPONIN I SERPL HS-MCNC: 42 NG/L (ref 0–22)
TROPONIN I SERPL HS-MCNC: 45 NG/L (ref 0–22)
TROPONIN I SERPL HS-MCNC: 50 NG/L (ref 0–22)
TSH SERPL DL<=0.05 MIU/L-ACNC: 2.12 UIU/ML (ref 0.27–4.2)
UROBILINOGEN UR STRIP-ACNC: 0.2 EU/DL (ref 0–1)
VIT B12 SERPL-MCNC: <150 PG/ML (ref 232–1245)
WBC #/AREA URNS HPF: ABNORMAL /HPF
WBC OTHER # BLD: 13.4 K/UL (ref 4.5–11.5)
WBC OTHER # BLD: 16 K/UL (ref 4.5–11.5)

## 2025-07-17 PROCEDURE — 94002 VENT MGMT INPAT INIT DAY: CPT

## 2025-07-17 PROCEDURE — 86403 PARTICLE AGGLUT ANTBDY SCRN: CPT

## 2025-07-17 PROCEDURE — 70450 CT HEAD/BRAIN W/O DYE: CPT

## 2025-07-17 PROCEDURE — 6360000002 HC RX W HCPCS

## 2025-07-17 PROCEDURE — 99285 EMERGENCY DEPT VISIT HI MDM: CPT

## 2025-07-17 PROCEDURE — 80307 DRUG TEST PRSMV CHEM ANLYZR: CPT

## 2025-07-17 PROCEDURE — 2580000003 HC RX 258

## 2025-07-17 PROCEDURE — 6370000000 HC RX 637 (ALT 250 FOR IP)

## 2025-07-17 PROCEDURE — 86140 C-REACTIVE PROTEIN: CPT

## 2025-07-17 PROCEDURE — G0480 DRUG TEST DEF 1-7 CLASSES: HCPCS

## 2025-07-17 PROCEDURE — 6360000002 HC RX W HCPCS: Performed by: STUDENT IN AN ORGANIZED HEALTH CARE EDUCATION/TRAINING PROGRAM

## 2025-07-17 PROCEDURE — 86738 MYCOPLASMA ANTIBODY: CPT

## 2025-07-17 PROCEDURE — 0BH17EZ INSERTION OF ENDOTRACHEAL AIRWAY INTO TRACHEA, VIA NATURAL OR ARTIFICIAL OPENING: ICD-10-PCS | Performed by: EMERGENCY MEDICINE

## 2025-07-17 PROCEDURE — 36556 INSERT NON-TUNNEL CV CATH: CPT

## 2025-07-17 PROCEDURE — 2580000003 HC RX 258: Performed by: EMERGENCY MEDICINE

## 2025-07-17 PROCEDURE — 84145 PROCALCITONIN (PCT): CPT

## 2025-07-17 PROCEDURE — 85652 RBC SED RATE AUTOMATED: CPT

## 2025-07-17 PROCEDURE — 80053 COMPREHEN METABOLIC PANEL: CPT

## 2025-07-17 PROCEDURE — 06HY33Z INSERTION OF INFUSION DEVICE INTO LOWER VEIN, PERCUTANEOUS APPROACH: ICD-10-PCS | Performed by: EMERGENCY MEDICINE

## 2025-07-17 PROCEDURE — 82746 ASSAY OF FOLIC ACID SERUM: CPT

## 2025-07-17 PROCEDURE — NBSRV NON-BILLABLE SERVICE: Performed by: INTERNAL MEDICINE

## 2025-07-17 PROCEDURE — 82803 BLOOD GASES ANY COMBINATION: CPT

## 2025-07-17 PROCEDURE — 72125 CT NECK SPINE W/O DYE: CPT

## 2025-07-17 PROCEDURE — 36600 WITHDRAWAL OF ARTERIAL BLOOD: CPT

## 2025-07-17 PROCEDURE — 87086 URINE CULTURE/COLONY COUNT: CPT

## 2025-07-17 PROCEDURE — P9047 ALBUMIN (HUMAN), 25%, 50ML: HCPCS

## 2025-07-17 PROCEDURE — 83735 ASSAY OF MAGNESIUM: CPT

## 2025-07-17 PROCEDURE — 84439 ASSAY OF FREE THYROXINE: CPT

## 2025-07-17 PROCEDURE — 94660 CPAP INITIATION&MGMT: CPT

## 2025-07-17 PROCEDURE — 84484 ASSAY OF TROPONIN QUANT: CPT

## 2025-07-17 PROCEDURE — 82140 ASSAY OF AMMONIA: CPT

## 2025-07-17 PROCEDURE — 71045 X-RAY EXAM CHEST 1 VIEW: CPT

## 2025-07-17 PROCEDURE — 99291 CRITICAL CARE FIRST HOUR: CPT | Performed by: INTERNAL MEDICINE

## 2025-07-17 PROCEDURE — 85025 COMPLETE CBC W/AUTO DIFF WBC: CPT

## 2025-07-17 PROCEDURE — 6370000000 HC RX 637 (ALT 250 FOR IP): Performed by: STUDENT IN AN ORGANIZED HEALTH CARE EDUCATION/TRAINING PROGRAM

## 2025-07-17 PROCEDURE — 82805 BLOOD GASES W/O2 SATURATION: CPT

## 2025-07-17 PROCEDURE — 2580000003 HC RX 258: Performed by: STUDENT IN AN ORGANIZED HEALTH CARE EDUCATION/TRAINING PROGRAM

## 2025-07-17 PROCEDURE — 87070 CULTURE OTHR SPECIMN AEROBIC: CPT

## 2025-07-17 PROCEDURE — 99223 1ST HOSP IP/OBS HIGH 75: CPT | Performed by: STUDENT IN AN ORGANIZED HEALTH CARE EDUCATION/TRAINING PROGRAM

## 2025-07-17 PROCEDURE — 83605 ASSAY OF LACTIC ACID: CPT

## 2025-07-17 PROCEDURE — 87040 BLOOD CULTURE FOR BACTERIA: CPT

## 2025-07-17 PROCEDURE — 2700000000 HC OXYGEN THERAPY PER DAY

## 2025-07-17 PROCEDURE — 87205 SMEAR GRAM STAIN: CPT

## 2025-07-17 PROCEDURE — 2000000000 HC ICU R&B

## 2025-07-17 PROCEDURE — 6360000002 HC RX W HCPCS: Performed by: EMERGENCY MEDICINE

## 2025-07-17 PROCEDURE — 96368 THER/DIAG CONCURRENT INF: CPT

## 2025-07-17 PROCEDURE — 84443 ASSAY THYROID STIM HORMONE: CPT

## 2025-07-17 PROCEDURE — 87081 CULTURE SCREEN ONLY: CPT

## 2025-07-17 PROCEDURE — 2500000003 HC RX 250 WO HCPCS

## 2025-07-17 PROCEDURE — 82607 VITAMIN B-12: CPT

## 2025-07-17 PROCEDURE — 87899 AGENT NOS ASSAY W/OPTIC: CPT

## 2025-07-17 PROCEDURE — 6360000002 HC RX W HCPCS: Performed by: INTERNAL MEDICINE

## 2025-07-17 PROCEDURE — 82962 GLUCOSE BLOOD TEST: CPT

## 2025-07-17 PROCEDURE — 5A1935Z RESPIRATORY VENTILATION, LESS THAN 24 CONSECUTIVE HOURS: ICD-10-PCS | Performed by: EMERGENCY MEDICINE

## 2025-07-17 PROCEDURE — 76705 ECHO EXAM OF ABDOMEN: CPT

## 2025-07-17 PROCEDURE — 93005 ELECTROCARDIOGRAM TRACING: CPT | Performed by: EMERGENCY MEDICINE

## 2025-07-17 PROCEDURE — 5A09357 ASSISTANCE WITH RESPIRATORY VENTILATION, LESS THAN 24 CONSECUTIVE HOURS, CONTINUOUS POSITIVE AIRWAY PRESSURE: ICD-10-PCS | Performed by: INTERNAL MEDICINE

## 2025-07-17 PROCEDURE — 2500000003 HC RX 250 WO HCPCS: Performed by: INTERNAL MEDICINE

## 2025-07-17 PROCEDURE — 6370000000 HC RX 637 (ALT 250 FOR IP): Performed by: INTERNAL MEDICINE

## 2025-07-17 PROCEDURE — 93005 ELECTROCARDIOGRAM TRACING: CPT | Performed by: STUDENT IN AN ORGANIZED HEALTH CARE EDUCATION/TRAINING PROGRAM

## 2025-07-17 PROCEDURE — 82306 VITAMIN D 25 HYDROXY: CPT

## 2025-07-17 PROCEDURE — 81001 URINALYSIS AUTO W/SCOPE: CPT

## 2025-07-17 PROCEDURE — 94640 AIRWAY INHALATION TREATMENT: CPT

## 2025-07-17 PROCEDURE — 96375 TX/PRO/DX INJ NEW DRUG ADDON: CPT

## 2025-07-17 PROCEDURE — 74018 RADEX ABDOMEN 1 VIEW: CPT

## 2025-07-17 PROCEDURE — 96365 THER/PROPH/DIAG IV INF INIT: CPT

## 2025-07-17 PROCEDURE — 31500 INSERT EMERGENCY AIRWAY: CPT

## 2025-07-17 PROCEDURE — 87077 CULTURE AEROBIC IDENTIFY: CPT

## 2025-07-17 PROCEDURE — 2500000003 HC RX 250 WO HCPCS: Performed by: STUDENT IN AN ORGANIZED HEALTH CARE EDUCATION/TRAINING PROGRAM

## 2025-07-17 RX ORDER — NALOXONE HYDROCHLORIDE 0.4 MG/ML
INJECTION, SOLUTION INTRAMUSCULAR; INTRAVENOUS; SUBCUTANEOUS
Status: COMPLETED
Start: 2025-07-17 | End: 2025-07-17

## 2025-07-17 RX ORDER — GLUCAGON 1 MG/ML
1 KIT INJECTION PRN
Status: DISCONTINUED | OUTPATIENT
Start: 2025-07-17 | End: 2025-07-23 | Stop reason: HOSPADM

## 2025-07-17 RX ORDER — DEXMEDETOMIDINE HYDROCHLORIDE 4 UG/ML
.1-1.5 INJECTION, SOLUTION INTRAVENOUS CONTINUOUS
Status: DISCONTINUED | OUTPATIENT
Start: 2025-07-17 | End: 2025-07-21

## 2025-07-17 RX ORDER — SODIUM CHLORIDE 0.9 % (FLUSH) 0.9 %
5-40 SYRINGE (ML) INJECTION EVERY 12 HOURS SCHEDULED
Status: DISCONTINUED | OUTPATIENT
Start: 2025-07-17 | End: 2025-07-23 | Stop reason: HOSPADM

## 2025-07-17 RX ORDER — ACETAMINOPHEN 325 MG/1
650 TABLET ORAL EVERY 6 HOURS PRN
Status: DISCONTINUED | OUTPATIENT
Start: 2025-07-17 | End: 2025-07-23 | Stop reason: HOSPADM

## 2025-07-17 RX ORDER — FENTANYL CITRATE 0.05 MG/ML
25 INJECTION, SOLUTION INTRAMUSCULAR; INTRAVENOUS EVERY 4 HOURS PRN
Status: DISCONTINUED | OUTPATIENT
Start: 2025-07-17 | End: 2025-07-22

## 2025-07-17 RX ORDER — POTASSIUM CHLORIDE 7.45 MG/ML
10 INJECTION INTRAVENOUS PRN
Status: DISCONTINUED | OUTPATIENT
Start: 2025-07-17 | End: 2025-07-23 | Stop reason: HOSPADM

## 2025-07-17 RX ORDER — PROPOFOL 10 MG/ML
5-50 INJECTION, EMULSION INTRAVENOUS CONTINUOUS
Status: DISCONTINUED | OUTPATIENT
Start: 2025-07-17 | End: 2025-07-17

## 2025-07-17 RX ORDER — SODIUM CHLORIDE, SODIUM LACTATE, POTASSIUM CHLORIDE, CALCIUM CHLORIDE 600; 310; 30; 20 MG/100ML; MG/100ML; MG/100ML; MG/100ML
INJECTION, SOLUTION INTRAVENOUS CONTINUOUS
Status: DISCONTINUED | OUTPATIENT
Start: 2025-07-17 | End: 2025-07-18

## 2025-07-17 RX ORDER — ENOXAPARIN SODIUM 100 MG/ML
30 INJECTION SUBCUTANEOUS 2 TIMES DAILY
Status: DISCONTINUED | OUTPATIENT
Start: 2025-07-17 | End: 2025-07-17

## 2025-07-17 RX ORDER — CYANOCOBALAMIN 1000 UG/ML
1000 INJECTION, SOLUTION INTRAMUSCULAR; SUBCUTANEOUS DAILY
Status: COMPLETED | OUTPATIENT
Start: 2025-07-17 | End: 2025-07-18

## 2025-07-17 RX ORDER — POTASSIUM CHLORIDE 29.8 MG/ML
20 INJECTION INTRAVENOUS
Status: COMPLETED | OUTPATIENT
Start: 2025-07-17 | End: 2025-07-17

## 2025-07-17 RX ORDER — POTASSIUM CHLORIDE 7.45 MG/ML
10 INJECTION INTRAVENOUS ONCE
Status: COMPLETED | OUTPATIENT
Start: 2025-07-17 | End: 2025-07-17

## 2025-07-17 RX ORDER — ONDANSETRON 4 MG/1
4 TABLET, ORALLY DISINTEGRATING ORAL EVERY 8 HOURS PRN
Status: DISCONTINUED | OUTPATIENT
Start: 2025-07-17 | End: 2025-07-23 | Stop reason: HOSPADM

## 2025-07-17 RX ORDER — 0.9 % SODIUM CHLORIDE 0.9 %
30 INTRAVENOUS SOLUTION INTRAVENOUS ONCE
Status: COMPLETED | OUTPATIENT
Start: 2025-07-17 | End: 2025-07-17

## 2025-07-17 RX ORDER — MAGNESIUM SULFATE IN WATER 40 MG/ML
2000 INJECTION, SOLUTION INTRAVENOUS ONCE
Status: COMPLETED | OUTPATIENT
Start: 2025-07-17 | End: 2025-07-17

## 2025-07-17 RX ORDER — SODIUM CHLORIDE, SODIUM LACTATE, POTASSIUM CHLORIDE, AND CALCIUM CHLORIDE .6; .31; .03; .02 G/100ML; G/100ML; G/100ML; G/100ML
1000 INJECTION, SOLUTION INTRAVENOUS ONCE
Status: DISCONTINUED | OUTPATIENT
Start: 2025-07-17 | End: 2025-07-17

## 2025-07-17 RX ORDER — NALOXONE HYDROCHLORIDE 0.4 MG/ML
0.4 INJECTION, SOLUTION INTRAMUSCULAR; INTRAVENOUS; SUBCUTANEOUS ONCE
Status: COMPLETED | OUTPATIENT
Start: 2025-07-17 | End: 2025-07-17

## 2025-07-17 RX ORDER — SODIUM CHLORIDE 9 MG/ML
INJECTION, SOLUTION INTRAVENOUS PRN
Status: DISCONTINUED | OUTPATIENT
Start: 2025-07-17 | End: 2025-07-23 | Stop reason: HOSPADM

## 2025-07-17 RX ORDER — ALBUMIN (HUMAN) 12.5 G/50ML
25 SOLUTION INTRAVENOUS ONCE
Status: COMPLETED | OUTPATIENT
Start: 2025-07-18 | End: 2025-07-18

## 2025-07-17 RX ORDER — MIDAZOLAM HYDROCHLORIDE 5 MG/ML
INJECTION, SOLUTION INTRAMUSCULAR; INTRAVENOUS
Status: DISCONTINUED
Start: 2025-07-17 | End: 2025-07-17 | Stop reason: WASHOUT

## 2025-07-17 RX ORDER — SODIUM CHLORIDE 0.9 % (FLUSH) 0.9 %
5-40 SYRINGE (ML) INJECTION PRN
Status: DISCONTINUED | OUTPATIENT
Start: 2025-07-17 | End: 2025-07-23 | Stop reason: HOSPADM

## 2025-07-17 RX ORDER — INSULIN LISPRO 100 [IU]/ML
0-8 INJECTION, SOLUTION INTRAVENOUS; SUBCUTANEOUS EVERY 6 HOURS
Status: DISCONTINUED | OUTPATIENT
Start: 2025-07-17 | End: 2025-07-18

## 2025-07-17 RX ORDER — PROPOFOL 10 MG/ML
5-50 INJECTION, EMULSION INTRAVENOUS ONCE
Status: DISCONTINUED | OUTPATIENT
Start: 2025-07-17 | End: 2025-07-17 | Stop reason: CLARIF

## 2025-07-17 RX ORDER — MAGNESIUM SULFATE IN WATER 40 MG/ML
2000 INJECTION, SOLUTION INTRAVENOUS PRN
Status: DISCONTINUED | OUTPATIENT
Start: 2025-07-17 | End: 2025-07-23 | Stop reason: HOSPADM

## 2025-07-17 RX ORDER — PROPOFOL 10 MG/ML
5-50 INJECTION, EMULSION INTRAVENOUS ONCE
Status: COMPLETED | OUTPATIENT
Start: 2025-07-17 | End: 2025-07-17

## 2025-07-17 RX ORDER — BUDESONIDE 0.5 MG/2ML
0.5 INHALANT ORAL
Status: DISCONTINUED | OUTPATIENT
Start: 2025-07-17 | End: 2025-07-23 | Stop reason: HOSPADM

## 2025-07-17 RX ORDER — CYANOCOBALAMIN 1000 UG/ML
1000 INJECTION, SOLUTION INTRAMUSCULAR; SUBCUTANEOUS
Status: DISCONTINUED | OUTPATIENT
Start: 2025-08-11 | End: 2025-07-23 | Stop reason: HOSPADM

## 2025-07-17 RX ORDER — INSULIN LISPRO 100 [IU]/ML
0-8 INJECTION, SOLUTION INTRAVENOUS; SUBCUTANEOUS
Status: DISCONTINUED | OUTPATIENT
Start: 2025-07-17 | End: 2025-07-17

## 2025-07-17 RX ORDER — ONDANSETRON 2 MG/ML
4 INJECTION INTRAMUSCULAR; INTRAVENOUS EVERY 6 HOURS PRN
Status: DISCONTINUED | OUTPATIENT
Start: 2025-07-17 | End: 2025-07-23 | Stop reason: HOSPADM

## 2025-07-17 RX ORDER — IPRATROPIUM BROMIDE AND ALBUTEROL SULFATE 2.5; .5 MG/3ML; MG/3ML
1 SOLUTION RESPIRATORY (INHALATION)
Status: DISCONTINUED | OUTPATIENT
Start: 2025-07-17 | End: 2025-07-23 | Stop reason: HOSPADM

## 2025-07-17 RX ORDER — ACETAMINOPHEN 650 MG/1
650 SUPPOSITORY RECTAL EVERY 6 HOURS PRN
Status: DISCONTINUED | OUTPATIENT
Start: 2025-07-17 | End: 2025-07-23 | Stop reason: HOSPADM

## 2025-07-17 RX ORDER — PROPOFOL 10 MG/ML
INJECTION, EMULSION INTRAVENOUS
Status: COMPLETED
Start: 2025-07-17 | End: 2025-07-17

## 2025-07-17 RX ORDER — DEXTROSE MONOHYDRATE 100 MG/ML
INJECTION, SOLUTION INTRAVENOUS CONTINUOUS PRN
Status: DISCONTINUED | OUTPATIENT
Start: 2025-07-17 | End: 2025-07-23 | Stop reason: HOSPADM

## 2025-07-17 RX ORDER — CYANOCOBALAMIN 1000 UG/ML
1000 INJECTION, SOLUTION INTRAMUSCULAR; SUBCUTANEOUS WEEKLY
Status: DISCONTINUED | OUTPATIENT
Start: 2025-07-21 | End: 2025-07-23 | Stop reason: HOSPADM

## 2025-07-17 RX ORDER — IBUPROFEN 600 MG/1
1 TABLET ORAL PRN
Status: ON HOLD | COMMUNITY
Start: 2025-06-29 | End: 2025-07-23 | Stop reason: HOSPADM

## 2025-07-17 RX ORDER — POTASSIUM CHLORIDE 1500 MG/1
40 TABLET, EXTENDED RELEASE ORAL PRN
Status: DISCONTINUED | OUTPATIENT
Start: 2025-07-17 | End: 2025-07-23 | Stop reason: HOSPADM

## 2025-07-17 RX ORDER — MIDAZOLAM HYDROCHLORIDE 1 MG/ML
4 INJECTION, SOLUTION INTRAMUSCULAR; INTRAVENOUS ONCE
Status: COMPLETED | OUTPATIENT
Start: 2025-07-17 | End: 2025-07-17

## 2025-07-17 RX ORDER — MIDAZOLAM HYDROCHLORIDE 1 MG/ML
4 INJECTION, SOLUTION INTRAMUSCULAR; INTRAVENOUS ONCE
Status: DISCONTINUED | OUTPATIENT
Start: 2025-07-17 | End: 2025-07-17

## 2025-07-17 RX ORDER — POLYETHYLENE GLYCOL 3350 17 G/17G
17 POWDER, FOR SOLUTION ORAL DAILY PRN
Status: DISCONTINUED | OUTPATIENT
Start: 2025-07-17 | End: 2025-07-23 | Stop reason: HOSPADM

## 2025-07-17 RX ADMIN — WATER 1000 MG: 1 INJECTION INTRAMUSCULAR; INTRAVENOUS; SUBCUTANEOUS at 09:01

## 2025-07-17 RX ADMIN — MAGNESIUM SULFATE HEPTAHYDRATE 2000 MG: 40 INJECTION, SOLUTION INTRAVENOUS at 07:59

## 2025-07-17 RX ADMIN — ALBUMIN (HUMAN) 25 G: 0.25 INJECTION, SOLUTION INTRAVENOUS at 23:46

## 2025-07-17 RX ADMIN — SODIUM CHLORIDE 3000 MG: 9 INJECTION, SOLUTION INTRAVENOUS at 22:33

## 2025-07-17 RX ADMIN — POTASSIUM CHLORIDE 20 MEQ: 29.8 INJECTION, SOLUTION INTRAVENOUS at 06:27

## 2025-07-17 RX ADMIN — SODIUM CHLORIDE 3000 MG: 9 INJECTION, SOLUTION INTRAVENOUS at 04:52

## 2025-07-17 RX ADMIN — PROPOFOL 50 MCG/KG/MIN: 10 INJECTION, EMULSION INTRAVENOUS at 07:23

## 2025-07-17 RX ADMIN — POTASSIUM CHLORIDE 10 MEQ: 7.46 INJECTION, SOLUTION INTRAVENOUS at 04:37

## 2025-07-17 RX ADMIN — APIXABAN 5 MG: 5 TABLET, FILM COATED ORAL at 20:35

## 2025-07-17 RX ADMIN — APIXABAN 5 MG: 5 TABLET, FILM COATED ORAL at 09:01

## 2025-07-17 RX ADMIN — Medication 0.2 MCG/KG/HR: at 15:52

## 2025-07-17 RX ADMIN — PANTOPRAZOLE SODIUM 40 MG: 40 INJECTION, POWDER, FOR SOLUTION INTRAVENOUS at 07:41

## 2025-07-17 RX ADMIN — NALOXONE HYDROCHLORIDE 0.4 MG: 0.4 INJECTION, SOLUTION INTRAMUSCULAR; INTRAVENOUS; SUBCUTANEOUS at 00:51

## 2025-07-17 RX ADMIN — BUDESONIDE 500 MCG: 0.5 SUSPENSION RESPIRATORY (INHALATION) at 09:48

## 2025-07-17 RX ADMIN — SODIUM CHLORIDE 3000 MG: 9 INJECTION, SOLUTION INTRAVENOUS at 17:37

## 2025-07-17 RX ADMIN — SODIUM CHLORIDE, SODIUM LACTATE, POTASSIUM CHLORIDE, AND CALCIUM CHLORIDE: .6; .31; .03; .02 INJECTION, SOLUTION INTRAVENOUS at 21:06

## 2025-07-17 RX ADMIN — SODIUM CHLORIDE, SODIUM LACTATE, POTASSIUM CHLORIDE, AND CALCIUM CHLORIDE 1000 ML: .6; .31; .03; .02 INJECTION, SOLUTION INTRAVENOUS at 05:38

## 2025-07-17 RX ADMIN — BUDESONIDE 500 MCG: 0.5 SUSPENSION RESPIRATORY (INHALATION) at 17:50

## 2025-07-17 RX ADMIN — SODIUM CHLORIDE 3279 ML: 0.9 INJECTION, SOLUTION INTRAVENOUS at 01:58

## 2025-07-17 RX ADMIN — IPRATROPIUM BROMIDE AND ALBUTEROL SULFATE 1 DOSE: 2.5; .5 SOLUTION RESPIRATORY (INHALATION) at 17:50

## 2025-07-17 RX ADMIN — PROPOFOL 20 MCG/KG/MIN: 10 INJECTION, EMULSION INTRAVENOUS at 01:16

## 2025-07-17 RX ADMIN — SODIUM CHLORIDE, PRESERVATIVE FREE 10 ML: 5 INJECTION INTRAVENOUS at 07:34

## 2025-07-17 RX ADMIN — SODIUM CHLORIDE, PRESERVATIVE FREE 10 ML: 5 INJECTION INTRAVENOUS at 19:48

## 2025-07-17 RX ADMIN — IPRATROPIUM BROMIDE AND ALBUTEROL SULFATE 1 DOSE: 2.5; .5 SOLUTION RESPIRATORY (INHALATION) at 14:47

## 2025-07-17 RX ADMIN — INSULIN LISPRO 2 UNITS: 100 INJECTION, SOLUTION INTRAVENOUS; SUBCUTANEOUS at 23:02

## 2025-07-17 RX ADMIN — POTASSIUM CHLORIDE 20 MEQ: 29.8 INJECTION, SOLUTION INTRAVENOUS at 07:33

## 2025-07-17 RX ADMIN — NALXONE HYDROCHLORIDE 0.4 MG: 0.4 INJECTION INTRAMUSCULAR; INTRAVENOUS; SUBCUTANEOUS at 19:49

## 2025-07-17 RX ADMIN — SODIUM CHLORIDE, SODIUM LACTATE, POTASSIUM CHLORIDE, AND CALCIUM CHLORIDE: .6; .31; .03; .02 INJECTION, SOLUTION INTRAVENOUS at 14:28

## 2025-07-17 RX ADMIN — NALOXONE HYDROCHLORIDE 0.4 MG/HR: 1 INJECTION INTRAMUSCULAR; INTRAVENOUS; SUBCUTANEOUS at 21:10

## 2025-07-17 RX ADMIN — IPRATROPIUM BROMIDE AND ALBUTEROL SULFATE 1 DOSE: 2.5; .5 SOLUTION RESPIRATORY (INHALATION) at 09:47

## 2025-07-17 RX ADMIN — ACETAMINOPHEN 650 MG: 325 TABLET ORAL at 20:35

## 2025-07-17 RX ADMIN — SODIUM CHLORIDE, SODIUM LACTATE, POTASSIUM CHLORIDE, AND CALCIUM CHLORIDE: .6; .31; .03; .02 INJECTION, SOLUTION INTRAVENOUS at 07:42

## 2025-07-17 RX ADMIN — PROPOFOL 35 MCG/KG/MIN: 10 INJECTION, EMULSION INTRAVENOUS at 09:01

## 2025-07-17 RX ADMIN — MIDAZOLAM HYDROCHLORIDE 4 MG: 1 INJECTION, SOLUTION INTRAMUSCULAR; INTRAVENOUS at 01:28

## 2025-07-17 RX ADMIN — CYANOCOBALAMIN 1000 MCG: 1000 INJECTION, SOLUTION INTRAMUSCULAR; SUBCUTANEOUS at 14:20

## 2025-07-17 RX ADMIN — NALXONE HYDROCHLORIDE 0.4 MG: 0.4 INJECTION INTRAMUSCULAR; INTRAVENOUS; SUBCUTANEOUS at 14:21

## 2025-07-17 ASSESSMENT — PULMONARY FUNCTION TESTS
PIF_VALUE: 26
PIF_VALUE: 22
PIF_VALUE: 25
PIF_VALUE: 47
PIF_VALUE: 26
PIF_VALUE: 30
PIF_VALUE: 38
PIF_VALUE: 25
PIF_VALUE: 0
PIF_VALUE: 25
PIF_VALUE: 25
PIF_VALUE: 50
PIF_VALUE: 25
PIF_VALUE: 0
PIF_VALUE: 44
PIF_VALUE: 47

## 2025-07-17 ASSESSMENT — PAIN SCALES - GENERAL
PAINLEVEL_OUTOF10: 0
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
## 2025-07-17 NOTE — ED PROVIDER NOTES
Patient is a 50 y/o male who presents to the ED via EMS from the nursing home. EMS reports that the patient girlfriend visited and minutes later the patient was found to be unresponsive and cyanotic. EMS suctioned tobacco from his mouth. He remains unresponsive. No other history is available at this time.         Review of Systems   Unable to perform ROS: Patient unresponsive        Physical Exam  Vitals and nursing note reviewed.   Constitutional:       Appearance: He is ill-appearing.   HENT:      Head:      Comments: Abrasion mid forehead     Right Ear: External ear normal.      Left Ear: External ear normal.      Nose: Nose normal.      Mouth/Throat:      Mouth: Mucous membranes are moist.   Eyes:      Conjunctiva/sclera: Conjunctivae normal.      Pupils: Pupils are equal, round, and reactive to light.   Cardiovascular:      Rate and Rhythm: Regular rhythm. Tachycardia present.      Heart sounds: No murmur heard.  Pulmonary:      Effort: Pulmonary effort is normal. No respiratory distress.      Breath sounds: Normal breath sounds. No stridor. No wheezing, rhonchi or rales.   Abdominal:      General: Bowel sounds are normal. There is distension.      Palpations: Abdomen is soft.      Tenderness: There is no abdominal tenderness. There is no guarding.   Musculoskeletal:         General: Normal range of motion.      Cervical back: Normal range of motion.   Skin:     General: Skin is warm and dry.   Neurological:      Mental Status: He is unresponsive.      GCS: GCS eye subscore is 4. GCS verbal subscore is 1. GCS motor subscore is 1.          Procedures     PROCEDURE  7/17/25       Time: 0100.    INTUBATION  Risks, benefits and alternatives if able (for applicable procedures below) described.   Performed By: Ron Magdaleno DO.    Indication:  impending airway compromise and comatose state.   Informed consent: Consent unable to be obtained due to the emergent nature of this procedure..  Procedure: Following

## 2025-07-18 ENCOUNTER — APPOINTMENT (OUTPATIENT)
Dept: GENERAL RADIOLOGY | Age: 50
DRG: 917 | End: 2025-07-18
Payer: MEDICARE

## 2025-07-18 LAB
AADO2: 89.4 MMHG
ALBUMIN SERPL-MCNC: 3.4 G/DL (ref 3.5–5.2)
ALP SERPL-CCNC: 177 U/L (ref 40–129)
ALT SERPL-CCNC: 48 U/L (ref 0–50)
ANION GAP SERPL CALCULATED.3IONS-SCNC: 11 MMOL/L (ref 7–16)
AST SERPL-CCNC: 41 U/L (ref 0–50)
B.E.: -4.1 MMOL/L (ref -3–3)
BASOPHILS # BLD: 0 K/UL (ref 0–0.2)
BASOPHILS NFR BLD: 0 % (ref 0–2)
BILIRUB SERPL-MCNC: 0.3 MG/DL (ref 0–1.2)
BUN SERPL-MCNC: 24 MG/DL (ref 6–20)
CALCIUM SERPL-MCNC: 8.7 MG/DL (ref 8.6–10)
CHLORIDE SERPL-SCNC: 107 MMOL/L (ref 98–107)
CO2 SERPL-SCNC: 20 MMOL/L (ref 22–29)
COHB: 0.9 % (ref 0–1.5)
CREAT SERPL-MCNC: 1.2 MG/DL (ref 0.7–1.2)
CRITICAL: ABNORMAL
DATE ANALYZED: ABNORMAL
DATE OF COLLECTION: ABNORMAL
EKG ATRIAL RATE: 116 BPM
EKG ATRIAL RATE: 95 BPM
EKG P AXIS: 58 DEGREES
EKG P AXIS: 62 DEGREES
EKG P-R INTERVAL: 180 MS
EKG P-R INTERVAL: 180 MS
EKG Q-T INTERVAL: 322 MS
EKG Q-T INTERVAL: 370 MS
EKG QRS DURATION: 100 MS
EKG QRS DURATION: 104 MS
EKG QTC CALCULATION (BAZETT): 447 MS
EKG QTC CALCULATION (BAZETT): 464 MS
EKG R AXIS: 33 DEGREES
EKG R AXIS: 53 DEGREES
EKG T AXIS: 64 DEGREES
EKG T AXIS: 77 DEGREES
EKG VENTRICULAR RATE: 116 BPM
EKG VENTRICULAR RATE: 95 BPM
EOSINOPHIL # BLD: 0.36 K/UL (ref 0.05–0.5)
EOSINOPHILS RELATIVE PERCENT: 2 % (ref 0–6)
ERYTHROCYTE [DISTWIDTH] IN BLOOD BY AUTOMATED COUNT: 14.9 % (ref 11.5–15)
FIO2: 35 %
GFR, ESTIMATED: 72 ML/MIN/1.73M2
GLUCOSE BLD-MCNC: 123 MG/DL (ref 74–99)
GLUCOSE BLD-MCNC: 159 MG/DL (ref 74–99)
GLUCOSE BLD-MCNC: 214 MG/DL (ref 74–99)
GLUCOSE SERPL-MCNC: 196 MG/DL (ref 74–99)
HCO3: 22.4 MMOL/L (ref 22–26)
HCT VFR BLD AUTO: 39 % (ref 37–54)
HGB BLD-MCNC: 12.7 G/DL (ref 12.5–16.5)
HHB: 2.9 % (ref 0–5)
LAB: ABNORMAL
LYMPHOCYTES NFR BLD: 1.09 K/UL (ref 1.5–4)
LYMPHOCYTES RELATIVE PERCENT: 5 % (ref 20–42)
Lab: 502
MAGNESIUM SERPL-MCNC: 1.7 MG/DL (ref 1.6–2.6)
MCH RBC QN AUTO: 31.9 PG (ref 26–35)
MCHC RBC AUTO-ENTMCNC: 32.6 G/DL (ref 32–34.5)
MCV RBC AUTO: 98 FL (ref 80–99.9)
METHB: 0.3 % (ref 0–1.5)
MICROORGANISM SPEC CULT: ABNORMAL
MICROORGANISM SPEC CULT: NO GROWTH
MODE: ABNORMAL
MONOCYTES NFR BLD: 1.64 K/UL (ref 0.1–0.95)
MONOCYTES NFR BLD: 8 % (ref 2–12)
MYCOPLASMA AB,IGM: NORMAL
NEUTROPHILS NFR BLD: 85 % (ref 43–80)
NEUTS SEG NFR BLD: 17.7 K/UL (ref 1.8–7.3)
O2 CONTENT: 17.9 ML/DL
O2 SATURATION: 97.1 % (ref 92–98.5)
O2HB: 95.9 % (ref 94–97)
OPERATOR ID: 1117
PATIENT TEMP: 37 C
PCO2: 46.2 MMHG (ref 35–45)
PEEP/CPAP: 6 CMH2O
PFO2: 2.79 MMHG/%
PH BLOOD GAS: 7.3 (ref 7.35–7.45)
PHOSPHATE SERPL-MCNC: 3.1 MG/DL (ref 2.5–4.5)
PLATELET # BLD AUTO: 366 K/UL (ref 130–450)
PMV BLD AUTO: 9.7 FL (ref 7–12)
PO2: 97.7 MMHG (ref 75–100)
POTASSIUM SERPL-SCNC: 4.5 MMOL/L (ref 3.5–5.1)
PROT SERPL-MCNC: 6 G/DL (ref 6.4–8.3)
PS: 16 CMH20
RBC # BLD AUTO: 3.98 M/UL (ref 3.8–5.8)
RBC # BLD: ABNORMAL 10*6/UL
RI(T): 0.92
RR MECHANICAL: 16 B/MIN
S PNEUM AG SPEC QL: NEGATIVE
SERVICE CMNT-IMP: NORMAL
SODIUM SERPL-SCNC: 138 MMOL/L (ref 136–145)
SOURCE, BLOOD GAS: ABNORMAL
SPECIMEN DESCRIPTION: ABNORMAL
SPECIMEN DESCRIPTION: NORMAL
SPECIMEN SOURCE: NORMAL
THB: 13.2 G/DL (ref 11.5–16.5)
TIME ANALYZED: 506
WBC OTHER # BLD: 20.8 K/UL (ref 4.5–11.5)

## 2025-07-18 PROCEDURE — 82962 GLUCOSE BLOOD TEST: CPT

## 2025-07-18 PROCEDURE — 2580000003 HC RX 258: Performed by: INTERNAL MEDICINE

## 2025-07-18 PROCEDURE — 94660 CPAP INITIATION&MGMT: CPT

## 2025-07-18 PROCEDURE — 2580000003 HC RX 258

## 2025-07-18 PROCEDURE — 6360000002 HC RX W HCPCS: Performed by: INTERNAL MEDICINE

## 2025-07-18 PROCEDURE — 6370000000 HC RX 637 (ALT 250 FOR IP): Performed by: STUDENT IN AN ORGANIZED HEALTH CARE EDUCATION/TRAINING PROGRAM

## 2025-07-18 PROCEDURE — 6370000000 HC RX 637 (ALT 250 FOR IP): Performed by: INTERNAL MEDICINE

## 2025-07-18 PROCEDURE — 93010 ELECTROCARDIOGRAM REPORT: CPT | Performed by: INTERNAL MEDICINE

## 2025-07-18 PROCEDURE — 6360000002 HC RX W HCPCS: Performed by: STUDENT IN AN ORGANIZED HEALTH CARE EDUCATION/TRAINING PROGRAM

## 2025-07-18 PROCEDURE — 36592 COLLECT BLOOD FROM PICC: CPT

## 2025-07-18 PROCEDURE — 2500000003 HC RX 250 WO HCPCS: Performed by: STUDENT IN AN ORGANIZED HEALTH CARE EDUCATION/TRAINING PROGRAM

## 2025-07-18 PROCEDURE — 6370000000 HC RX 637 (ALT 250 FOR IP)

## 2025-07-18 PROCEDURE — 51798 US URINE CAPACITY MEASURE: CPT

## 2025-07-18 PROCEDURE — 99291 CRITICAL CARE FIRST HOUR: CPT | Performed by: INTERNAL MEDICINE

## 2025-07-18 PROCEDURE — 85025 COMPLETE CBC W/AUTO DIFF WBC: CPT

## 2025-07-18 PROCEDURE — 80053 COMPREHEN METABOLIC PANEL: CPT

## 2025-07-18 PROCEDURE — 71045 X-RAY EXAM CHEST 1 VIEW: CPT

## 2025-07-18 PROCEDURE — 94640 AIRWAY INHALATION TREATMENT: CPT

## 2025-07-18 PROCEDURE — 97535 SELF CARE MNGMENT TRAINING: CPT | Performed by: OCCUPATIONAL THERAPIST

## 2025-07-18 PROCEDURE — 2580000003 HC RX 258: Performed by: STUDENT IN AN ORGANIZED HEALTH CARE EDUCATION/TRAINING PROGRAM

## 2025-07-18 PROCEDURE — 84100 ASSAY OF PHOSPHORUS: CPT

## 2025-07-18 PROCEDURE — 2000000000 HC ICU R&B

## 2025-07-18 PROCEDURE — 2500000003 HC RX 250 WO HCPCS

## 2025-07-18 PROCEDURE — 6360000002 HC RX W HCPCS

## 2025-07-18 PROCEDURE — 2500000003 HC RX 250 WO HCPCS: Performed by: INTERNAL MEDICINE

## 2025-07-18 PROCEDURE — 83735 ASSAY OF MAGNESIUM: CPT

## 2025-07-18 PROCEDURE — 82805 BLOOD GASES W/O2 SATURATION: CPT

## 2025-07-18 PROCEDURE — 99233 SBSQ HOSP IP/OBS HIGH 50: CPT | Performed by: INTERNAL MEDICINE

## 2025-07-18 PROCEDURE — 97165 OT EVAL LOW COMPLEX 30 MIN: CPT | Performed by: OCCUPATIONAL THERAPIST

## 2025-07-18 PROCEDURE — 36600 WITHDRAWAL OF ARTERIAL BLOOD: CPT

## 2025-07-18 PROCEDURE — 97530 THERAPEUTIC ACTIVITIES: CPT | Performed by: OCCUPATIONAL THERAPIST

## 2025-07-18 RX ORDER — 0.9 % SODIUM CHLORIDE 0.9 %
250 INTRAVENOUS SOLUTION INTRAVENOUS ONCE
Status: DISCONTINUED | OUTPATIENT
Start: 2025-07-18 | End: 2025-07-18

## 2025-07-18 RX ORDER — FUROSEMIDE 10 MG/ML
20 INJECTION INTRAMUSCULAR; INTRAVENOUS ONCE
Status: COMPLETED | OUTPATIENT
Start: 2025-07-18 | End: 2025-07-18

## 2025-07-18 RX ORDER — 0.9 % SODIUM CHLORIDE 0.9 %
500 INTRAVENOUS SOLUTION INTRAVENOUS ONCE
Status: COMPLETED | OUTPATIENT
Start: 2025-07-18 | End: 2025-07-18

## 2025-07-18 RX ORDER — MUPIROCIN 2 %
OINTMENT (GRAM) TOPICAL 2 TIMES DAILY
Status: COMPLETED | OUTPATIENT
Start: 2025-07-18 | End: 2025-07-23

## 2025-07-18 RX ORDER — ERGOCALCIFEROL 1.25 MG/1
50000 CAPSULE, LIQUID FILLED ORAL WEEKLY
Status: DISCONTINUED | OUTPATIENT
Start: 2025-07-18 | End: 2025-07-23 | Stop reason: HOSPADM

## 2025-07-18 RX ORDER — MAGNESIUM SULFATE 1 G/100ML
1000 INJECTION INTRAVENOUS ONCE
Status: COMPLETED | OUTPATIENT
Start: 2025-07-18 | End: 2025-07-18

## 2025-07-18 RX ORDER — DIPHENHYDRAMINE HYDROCHLORIDE 50 MG/ML
25 INJECTION, SOLUTION INTRAMUSCULAR; INTRAVENOUS ONCE
Status: COMPLETED | OUTPATIENT
Start: 2025-07-19 | End: 2025-07-19

## 2025-07-18 RX ORDER — DIPHENHYDRAMINE HYDROCHLORIDE 50 MG/ML
25 INJECTION, SOLUTION INTRAMUSCULAR; INTRAVENOUS ONCE
Status: COMPLETED | OUTPATIENT
Start: 2025-07-18 | End: 2025-07-18

## 2025-07-18 RX ORDER — INSULIN LISPRO 100 [IU]/ML
0-8 INJECTION, SOLUTION INTRAVENOUS; SUBCUTANEOUS
Status: DISCONTINUED | OUTPATIENT
Start: 2025-07-18 | End: 2025-07-23 | Stop reason: HOSPADM

## 2025-07-18 RX ADMIN — INSULIN LISPRO 2 UNITS: 100 INJECTION, SOLUTION INTRAVENOUS; SUBCUTANEOUS at 05:27

## 2025-07-18 RX ADMIN — SODIUM CHLORIDE 3000 MG: 9 INJECTION, SOLUTION INTRAVENOUS at 04:47

## 2025-07-18 RX ADMIN — IPRATROPIUM BROMIDE AND ALBUTEROL SULFATE 1 DOSE: 2.5; .5 SOLUTION RESPIRATORY (INHALATION) at 18:26

## 2025-07-18 RX ADMIN — APIXABAN 5 MG: 5 TABLET, FILM COATED ORAL at 09:28

## 2025-07-18 RX ADMIN — CYANOCOBALAMIN 1000 MCG: 1000 INJECTION, SOLUTION INTRAMUSCULAR; SUBCUTANEOUS at 08:08

## 2025-07-18 RX ADMIN — SODIUM CHLORIDE, PRESERVATIVE FREE 10 ML: 5 INJECTION INTRAVENOUS at 08:07

## 2025-07-18 RX ADMIN — SODIUM CHLORIDE 500 ML: 0.9 INJECTION, SOLUTION INTRAVENOUS at 17:41

## 2025-07-18 RX ADMIN — DIPHENHYDRAMINE HYDROCHLORIDE 25 MG: 50 INJECTION INTRAMUSCULAR; INTRAVENOUS at 21:14

## 2025-07-18 RX ADMIN — Medication 0.4 MCG/KG/HR: at 08:09

## 2025-07-18 RX ADMIN — WATER 1000 MG: 1 INJECTION INTRAMUSCULAR; INTRAVENOUS; SUBCUTANEOUS at 07:59

## 2025-07-18 RX ADMIN — BUDESONIDE 500 MCG: 0.5 SUSPENSION RESPIRATORY (INHALATION) at 05:18

## 2025-07-18 RX ADMIN — APIXABAN 5 MG: 5 TABLET, FILM COATED ORAL at 20:54

## 2025-07-18 RX ADMIN — MUPIROCIN: 20 OINTMENT TOPICAL at 21:04

## 2025-07-18 RX ADMIN — SODIUM CHLORIDE, PRESERVATIVE FREE 10 ML: 5 INJECTION INTRAVENOUS at 20:54

## 2025-07-18 RX ADMIN — INSULIN LISPRO 2 UNITS: 100 INJECTION, SOLUTION INTRAVENOUS; SUBCUTANEOUS at 21:03

## 2025-07-18 RX ADMIN — SODIUM CHLORIDE, SODIUM LACTATE, POTASSIUM CHLORIDE, AND CALCIUM CHLORIDE: .6; .31; .03; .02 INJECTION, SOLUTION INTRAVENOUS at 03:38

## 2025-07-18 RX ADMIN — SODIUM CHLORIDE 500 ML: 0.9 INJECTION, SOLUTION INTRAVENOUS at 19:56

## 2025-07-18 RX ADMIN — VANCOMYCIN HYDROCHLORIDE 1500 MG: 10 INJECTION, POWDER, LYOPHILIZED, FOR SOLUTION INTRAVENOUS at 10:48

## 2025-07-18 RX ADMIN — NALOXONE HYDROCHLORIDE 0.4 MG/HR: 1 INJECTION INTRAMUSCULAR; INTRAVENOUS; SUBCUTANEOUS at 07:45

## 2025-07-18 RX ADMIN — FUROSEMIDE 20 MG: 10 INJECTION, SOLUTION INTRAMUSCULAR; INTRAVENOUS at 10:47

## 2025-07-18 RX ADMIN — FAMOTIDINE 20 MG: 10 INJECTION, SOLUTION INTRAVENOUS at 21:18

## 2025-07-18 RX ADMIN — MAGNESIUM SULFATE HEPTAHYDRATE 1000 MG: 1 INJECTION, SOLUTION INTRAVENOUS at 08:04

## 2025-07-18 RX ADMIN — ERGOCALCIFEROL 50000 UNITS: 1.25 CAPSULE ORAL at 18:19

## 2025-07-18 RX ADMIN — PANTOPRAZOLE SODIUM 40 MG: 40 INJECTION, POWDER, FOR SOLUTION INTRAVENOUS at 08:05

## 2025-07-18 RX ADMIN — IPRATROPIUM BROMIDE AND ALBUTEROL SULFATE 1 DOSE: 2.5; .5 SOLUTION RESPIRATORY (INHALATION) at 09:27

## 2025-07-18 RX ADMIN — IPRATROPIUM BROMIDE AND ALBUTEROL SULFATE 1 DOSE: 2.5; .5 SOLUTION RESPIRATORY (INHALATION) at 05:18

## 2025-07-18 RX ADMIN — BUDESONIDE 500 MCG: 0.5 SUSPENSION RESPIRATORY (INHALATION) at 18:26

## 2025-07-18 RX ADMIN — IPRATROPIUM BROMIDE AND ALBUTEROL SULFATE 1 DOSE: 2.5; .5 SOLUTION RESPIRATORY (INHALATION) at 14:33

## 2025-07-18 ASSESSMENT — PAIN DESCRIPTION - PAIN TYPE: TYPE: CHRONIC PAIN

## 2025-07-18 ASSESSMENT — PAIN SCALES - GENERAL
PAINLEVEL_OUTOF10: 6
PAINLEVEL_OUTOF10: 0

## 2025-07-18 ASSESSMENT — PAIN DESCRIPTION - ONSET: ONSET: PROGRESSIVE

## 2025-07-18 ASSESSMENT — PAIN DESCRIPTION - DESCRIPTORS: DESCRIPTORS: ACHING;DULL

## 2025-07-18 ASSESSMENT — PAIN DESCRIPTION - FREQUENCY: FREQUENCY: CONTINUOUS

## 2025-07-18 ASSESSMENT — PAIN DESCRIPTION - LOCATION: LOCATION: HIP;LEG

## 2025-07-18 ASSESSMENT — PAIN - FUNCTIONAL ASSESSMENT: PAIN_FUNCTIONAL_ASSESSMENT: PREVENTS OR INTERFERES SOME ACTIVE ACTIVITIES AND ADLS

## 2025-07-18 ASSESSMENT — PAIN DESCRIPTION - ORIENTATION: ORIENTATION: LEFT
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
## 2025-07-18 NOTE — ACP (ADVANCE CARE PLANNING)
Advance Care Planning   Healthcare Decision Maker:    Primary Decision Maker: Frederic Del Angel - Child - [phone]    Primary Decision Maker: Tim Del Angel - Child - [phone]    Today we documented Decision Maker(s) consistent with Legal Next of Kin hierarchy.
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
## 2025-07-19 ENCOUNTER — APPOINTMENT (OUTPATIENT)
Dept: GENERAL RADIOLOGY | Age: 50
DRG: 917 | End: 2025-07-19
Payer: MEDICARE

## 2025-07-19 LAB
ALBUMIN SERPL-MCNC: 3.8 G/DL (ref 3.5–5.2)
ALP SERPL-CCNC: 201 U/L (ref 40–129)
ALT SERPL-CCNC: 43 U/L (ref 0–50)
ANION GAP SERPL CALCULATED.3IONS-SCNC: 12 MMOL/L (ref 7–16)
AST SERPL-CCNC: 33 U/L (ref 0–50)
BASOPHILS # BLD: 0.07 K/UL (ref 0–0.2)
BASOPHILS NFR BLD: 1 % (ref 0–2)
BILIRUB SERPL-MCNC: 0.4 MG/DL (ref 0–1.2)
BUN SERPL-MCNC: 17 MG/DL (ref 6–20)
CALCIUM SERPL-MCNC: 9.7 MG/DL (ref 8.6–10)
CHLORIDE SERPL-SCNC: 101 MMOL/L (ref 98–107)
CO2 SERPL-SCNC: 20 MMOL/L (ref 22–29)
CREAT SERPL-MCNC: 0.8 MG/DL (ref 0.7–1.2)
DATE LAST DOSE: NORMAL
EOSINOPHIL # BLD: 0.54 K/UL (ref 0.05–0.5)
EOSINOPHILS RELATIVE PERCENT: 4 % (ref 0–6)
ERYTHROCYTE [DISTWIDTH] IN BLOOD BY AUTOMATED COUNT: 15 % (ref 11.5–15)
GFR, ESTIMATED: >90 ML/MIN/1.73M2
GLUCOSE BLD-MCNC: 167 MG/DL (ref 74–99)
GLUCOSE BLD-MCNC: 168 MG/DL (ref 74–99)
GLUCOSE BLD-MCNC: 175 MG/DL (ref 74–99)
GLUCOSE SERPL-MCNC: 167 MG/DL (ref 74–99)
HCT VFR BLD AUTO: 41.2 % (ref 37–54)
HGB BLD-MCNC: 13.6 G/DL (ref 12.5–16.5)
IMM GRANULOCYTES # BLD AUTO: 0.06 K/UL (ref 0–0.58)
IMM GRANULOCYTES NFR BLD: 0 % (ref 0–5)
LYMPHOCYTES NFR BLD: 1.67 K/UL (ref 1.5–4)
LYMPHOCYTES RELATIVE PERCENT: 12 % (ref 20–42)
MAGNESIUM SERPL-MCNC: 1.9 MG/DL (ref 1.6–2.6)
MCH RBC QN AUTO: 32.2 PG (ref 26–35)
MCHC RBC AUTO-ENTMCNC: 33 G/DL (ref 32–34.5)
MCV RBC AUTO: 97.6 FL (ref 80–99.9)
MICROORGANISM SPEC CULT: ABNORMAL
MICROORGANISM/AGENT SPEC: ABNORMAL
MONOCYTES NFR BLD: 1.25 K/UL (ref 0.1–0.95)
MONOCYTES NFR BLD: 9 % (ref 2–12)
NEUTROPHILS NFR BLD: 74 % (ref 43–80)
NEUTS SEG NFR BLD: 10.07 K/UL (ref 1.8–7.3)
PHOSPHATE SERPL-MCNC: 2.9 MG/DL (ref 2.5–4.5)
PLATELET # BLD AUTO: 345 K/UL (ref 130–450)
PMV BLD AUTO: 9.6 FL (ref 7–12)
POTASSIUM SERPL-SCNC: 4.2 MMOL/L (ref 3.5–5.1)
PROCALCITONIN SERPL-MCNC: 0.18 NG/ML (ref 0–0.08)
PROT SERPL-MCNC: 7.1 G/DL (ref 6.4–8.3)
RBC # BLD AUTO: 4.22 M/UL (ref 3.8–5.8)
SODIUM SERPL-SCNC: 132 MMOL/L (ref 136–145)
SPECIMEN DESCRIPTION: ABNORMAL
TME LAST DOSE: NORMAL H
VANCOMYCIN DOSE: NORMAL MG
VANCOMYCIN SERPL-MCNC: 6.3 UG/ML (ref 5–40)
WBC OTHER # BLD: 13.7 K/UL (ref 4.5–11.5)

## 2025-07-19 PROCEDURE — 83735 ASSAY OF MAGNESIUM: CPT

## 2025-07-19 PROCEDURE — 2500000003 HC RX 250 WO HCPCS: Performed by: SPECIALIST

## 2025-07-19 PROCEDURE — 2580000003 HC RX 258: Performed by: INTERNAL MEDICINE

## 2025-07-19 PROCEDURE — 94660 CPAP INITIATION&MGMT: CPT

## 2025-07-19 PROCEDURE — 82962 GLUCOSE BLOOD TEST: CPT

## 2025-07-19 PROCEDURE — 99233 SBSQ HOSP IP/OBS HIGH 50: CPT | Performed by: INTERNAL MEDICINE

## 2025-07-19 PROCEDURE — 2000000000 HC ICU R&B

## 2025-07-19 PROCEDURE — 6360000002 HC RX W HCPCS: Performed by: SPECIALIST

## 2025-07-19 PROCEDURE — 84145 PROCALCITONIN (PCT): CPT

## 2025-07-19 PROCEDURE — 71045 X-RAY EXAM CHEST 1 VIEW: CPT

## 2025-07-19 PROCEDURE — 36415 COLL VENOUS BLD VENIPUNCTURE: CPT

## 2025-07-19 PROCEDURE — 6370000000 HC RX 637 (ALT 250 FOR IP): Performed by: SPECIALIST

## 2025-07-19 PROCEDURE — 80053 COMPREHEN METABOLIC PANEL: CPT

## 2025-07-19 PROCEDURE — 6360000002 HC RX W HCPCS: Performed by: INTERNAL MEDICINE

## 2025-07-19 PROCEDURE — 80202 ASSAY OF VANCOMYCIN: CPT

## 2025-07-19 PROCEDURE — 2500000003 HC RX 250 WO HCPCS

## 2025-07-19 PROCEDURE — 99291 CRITICAL CARE FIRST HOUR: CPT | Performed by: INTERNAL MEDICINE

## 2025-07-19 PROCEDURE — 85025 COMPLETE CBC W/AUTO DIFF WBC: CPT

## 2025-07-19 PROCEDURE — 6360000002 HC RX W HCPCS

## 2025-07-19 PROCEDURE — 97110 THERAPEUTIC EXERCISES: CPT | Performed by: PHYSICAL THERAPIST

## 2025-07-19 PROCEDURE — 6370000000 HC RX 637 (ALT 250 FOR IP): Performed by: STUDENT IN AN ORGANIZED HEALTH CARE EDUCATION/TRAINING PROGRAM

## 2025-07-19 PROCEDURE — 84100 ASSAY OF PHOSPHORUS: CPT

## 2025-07-19 PROCEDURE — 97161 PT EVAL LOW COMPLEX 20 MIN: CPT | Performed by: PHYSICAL THERAPIST

## 2025-07-19 PROCEDURE — 6360000002 HC RX W HCPCS: Performed by: STUDENT IN AN ORGANIZED HEALTH CARE EDUCATION/TRAINING PROGRAM

## 2025-07-19 PROCEDURE — 2500000003 HC RX 250 WO HCPCS: Performed by: STUDENT IN AN ORGANIZED HEALTH CARE EDUCATION/TRAINING PROGRAM

## 2025-07-19 PROCEDURE — 6370000000 HC RX 637 (ALT 250 FOR IP)

## 2025-07-19 PROCEDURE — 2700000000 HC OXYGEN THERAPY PER DAY

## 2025-07-19 PROCEDURE — 94640 AIRWAY INHALATION TREATMENT: CPT

## 2025-07-19 RX ORDER — LINEZOLID 2 MG/ML
600 INJECTION, SOLUTION INTRAVENOUS EVERY 12 HOURS
Status: COMPLETED | OUTPATIENT
Start: 2025-07-19 | End: 2025-07-20

## 2025-07-19 RX ORDER — PANTOPRAZOLE SODIUM 40 MG/1
40 TABLET, DELAYED RELEASE ORAL
Status: DISCONTINUED | OUTPATIENT
Start: 2025-07-20 | End: 2025-07-23 | Stop reason: HOSPADM

## 2025-07-19 RX ORDER — FLUCONAZOLE 100 MG/1
400 TABLET ORAL DAILY
Status: DISCONTINUED | OUTPATIENT
Start: 2025-07-19 | End: 2025-07-23 | Stop reason: HOSPADM

## 2025-07-19 RX ORDER — FUROSEMIDE 10 MG/ML
40 INJECTION INTRAMUSCULAR; INTRAVENOUS DAILY
Status: DISCONTINUED | OUTPATIENT
Start: 2025-07-19 | End: 2025-07-21

## 2025-07-19 RX ADMIN — SODIUM CHLORIDE, PRESERVATIVE FREE 10 ML: 5 INJECTION INTRAVENOUS at 09:13

## 2025-07-19 RX ADMIN — WATER 2000 MG: 1 INJECTION INTRAMUSCULAR; INTRAVENOUS; SUBCUTANEOUS at 20:17

## 2025-07-19 RX ADMIN — SODIUM CHLORIDE, PRESERVATIVE FREE 10 ML: 5 INJECTION INTRAVENOUS at 20:18

## 2025-07-19 RX ADMIN — MUPIROCIN: 20 OINTMENT TOPICAL at 20:30

## 2025-07-19 RX ADMIN — DIPHENHYDRAMINE HYDROCHLORIDE 25 MG: 50 INJECTION INTRAMUSCULAR; INTRAVENOUS at 00:08

## 2025-07-19 RX ADMIN — IPRATROPIUM BROMIDE AND ALBUTEROL SULFATE 1 DOSE: 2.5; .5 SOLUTION RESPIRATORY (INHALATION) at 10:11

## 2025-07-19 RX ADMIN — FUROSEMIDE 40 MG: 10 INJECTION, SOLUTION INTRAMUSCULAR; INTRAVENOUS at 10:30

## 2025-07-19 RX ADMIN — LINEZOLID 600 MG: 600 INJECTION, SOLUTION INTRAVENOUS at 12:14

## 2025-07-19 RX ADMIN — APIXABAN 5 MG: 5 TABLET, FILM COATED ORAL at 09:12

## 2025-07-19 RX ADMIN — PANTOPRAZOLE SODIUM 40 MG: 40 INJECTION, POWDER, FOR SOLUTION INTRAVENOUS at 09:12

## 2025-07-19 RX ADMIN — BUDESONIDE 500 MCG: 0.5 SUSPENSION RESPIRATORY (INHALATION) at 06:39

## 2025-07-19 RX ADMIN — IPRATROPIUM BROMIDE AND ALBUTEROL SULFATE 1 DOSE: 2.5; .5 SOLUTION RESPIRATORY (INHALATION) at 14:07

## 2025-07-19 RX ADMIN — WATER 1000 MG: 1 INJECTION INTRAMUSCULAR; INTRAVENOUS; SUBCUTANEOUS at 09:12

## 2025-07-19 RX ADMIN — IPRATROPIUM BROMIDE AND ALBUTEROL SULFATE 1 DOSE: 2.5; .5 SOLUTION RESPIRATORY (INHALATION) at 18:06

## 2025-07-19 RX ADMIN — APIXABAN 5 MG: 5 TABLET, FILM COATED ORAL at 20:17

## 2025-07-19 RX ADMIN — MUPIROCIN: 20 OINTMENT TOPICAL at 09:12

## 2025-07-19 RX ADMIN — BUDESONIDE 500 MCG: 0.5 SUSPENSION RESPIRATORY (INHALATION) at 18:06

## 2025-07-19 RX ADMIN — Medication 6 MG: at 20:17

## 2025-07-19 RX ADMIN — VANCOMYCIN HYDROCHLORIDE 1500 MG: 10 INJECTION, POWDER, LYOPHILIZED, FOR SOLUTION INTRAVENOUS at 00:24

## 2025-07-19 RX ADMIN — FLUCONAZOLE 400 MG: 100 TABLET ORAL at 17:11

## 2025-07-19 RX ADMIN — IPRATROPIUM BROMIDE AND ALBUTEROL SULFATE 1 DOSE: 2.5; .5 SOLUTION RESPIRATORY (INHALATION) at 06:39

## 2025-07-19 ASSESSMENT — PAIN SCALES - GENERAL
PAINLEVEL_OUTOF10: 0
PAINLEVEL_OUTOF10: 0

## 2025-07-19 NOTE — CONSULTS
Assessment and Plan  Patient is a 49 y.o. male with the following medical Problems:       Assessment  Acute hypoxic respiratory failure requiring emergent intubation  Concern for aspiration  Unresponsive  Polysubstance abuse  Suspicion of drug overdose  Lactic acidosis  Transaminitis  Leukocytosis  Elevated troponin 2/2 demand ischemia      Plan  Most recent hemodynamics reviewed, complaining of mild  Initial lactic acid 5.2 with repeat 2.5, will repeat lactic acid and continue to monitor and trend  Lactated Ringer's at 150 mL an hour  During patient's intubation due to acute hypoxic respiratory failure, it was noted patient had copious amounts of what appeared to be chewing tobacco in and around vocal cords and much was suctioned.    Covered patient with broad-spectrum antibiotics ceftriaxone and Unasyn was also added  ABGs reviewed, will repeat ABG and continue to monitor and trend and make necessary changes to ventilator settings.  Continue to wean FiO2 and maintain SPO2 greater than 92 to 94%  Oxygen therapy protocol ordered  Blood cultures x 2, results pending  Urine culture, MRSA culture and respiratory culture ordered  Strep pneumonia and Legionella antigens ordered  Mycoplasma pneumonia IgM  Urine toxicology positive for fentanyl, oxycodone and cocaine  Accurate intake and output every shift  VTE prophylaxis Eliquis and SCDs  PPI prophylaxis Protonix  CODE STATUS: Full code    History of Present Illness: Fausto Del Angel 49-year-old  male with significant past medical history for blood clots, AKA, anemia, cardiac arrest, cellulitis, chronic back pain, polysubstance abuse, depression, DM, rib fracture, fibromyalgia, GERD, seasonal allergies, DVT, HTN, inferior vena cava occlusion, presented to [facility] ED on 7/17/2025 via EMS from nursing home due to altered mental status and concern for suspected drug overdose.  Per chart review and speaking with the ED physician, patient was visited by his 
Polysubstance abuse ([facility]) F19.10    Type 2 diabetes mellitus with hyperglycemia, with long-term current use of insulin ([facility]) E11.65, Z79.4    Hypokalemia E87.6    Accidental drug overdose T50.901A    B12 deficiency E53.8    Acute hypoxic respiratory failure (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  Please note that 30 minutes were spent on this case in regards to the intensity and complexity inherent to hospital inpatient or observation care associated with a confirmed or suspected infectious disease.  Infection should be closely followed for signs of improvement or worsening condition.  Therapy requires intensive monitoring for antimicrobial agent toxicity. This included education to patient/representative and/for hospital staff in regards to disease transmission risk assessment and mitigation, public health investigation, analysis and testing, and complex antimicrobial therapy counseling and treatment.       Thank you for allowing me to participate in this patient's care.  Please call [phone]  for any questions or concerns.    Electronically signed by Celeste Mcgarry MD on 7/19/2025 at 11:36 AM

## 2025-07-19 NOTE — FLOWSHEET NOTE
Intensive Care Daily Quality Rounding Checklist      ICU Day #: 1d 18h    SOFA Score: Patient Scoring              Sequential Organ Failure Assessment:  4           Intubation Date and Number of days on Ventilator:      Lines:    CVC Indication:       DVT Prophylaxis: apixaban - 5 MG  apixaban Tabs - 5 MG    GI Prophylaxis: protonix daily    Langford Catheter Insertion Date:    Indications:   Continued need (if yes, reason documented and discussed with physician):       Skin Issues/ Wounds and ordered treatment discussed on rounds: no    Goals/ Plans for the Day: {[facility] Quality Flow Goal(s) of the Day/Commitment(s):01154583}    Reviewed plan and goals for day with patient and/or representative: {YES/NO:68880}    **SHARE this note so that the rounding nurse may edit**

## 2025-07-20 ENCOUNTER — APPOINTMENT (OUTPATIENT)
Dept: GENERAL RADIOLOGY | Age: 50
DRG: 917 | End: 2025-07-20
Payer: MEDICARE

## 2025-07-20 LAB
ALBUMIN SERPL-MCNC: 3.5 G/DL (ref 3.5–5.2)
ALP SERPL-CCNC: 170 U/L (ref 40–129)
ALT SERPL-CCNC: 32 U/L (ref 0–50)
ANION GAP SERPL CALCULATED.3IONS-SCNC: 10 MMOL/L (ref 7–16)
AST SERPL-CCNC: 23 U/L (ref 0–50)
BASOPHILS # BLD: 0.07 K/UL (ref 0–0.2)
BASOPHILS NFR BLD: 1 % (ref 0–2)
BILIRUB SERPL-MCNC: 0.4 MG/DL (ref 0–1.2)
BUN SERPL-MCNC: 9 MG/DL (ref 6–20)
CALCIUM SERPL-MCNC: 9.4 MG/DL (ref 8.6–10)
CHLORIDE SERPL-SCNC: 100 MMOL/L (ref 98–107)
CO2 SERPL-SCNC: 26 MMOL/L (ref 22–29)
CREAT SERPL-MCNC: 0.6 MG/DL (ref 0.7–1.2)
CRP SERPL HS-MCNC: 110 MG/L (ref 0–5)
EOSINOPHIL # BLD: 0.73 K/UL (ref 0.05–0.5)
EOSINOPHILS RELATIVE PERCENT: 7 % (ref 0–6)
ERYTHROCYTE [DISTWIDTH] IN BLOOD BY AUTOMATED COUNT: 14.4 % (ref 11.5–15)
ERYTHROCYTE [SEDIMENTATION RATE] IN BLOOD BY WESTERGREN METHOD: 35 MM/HR (ref 0–15)
GFR, ESTIMATED: >90 ML/MIN/1.73M2
GLUCOSE BLD-MCNC: 154 MG/DL (ref 74–99)
GLUCOSE BLD-MCNC: 163 MG/DL (ref 74–99)
GLUCOSE BLD-MCNC: 188 MG/DL (ref 74–99)
GLUCOSE BLD-MCNC: 243 MG/DL (ref 74–99)
GLUCOSE BLD-MCNC: 245 MG/DL (ref 74–99)
GLUCOSE SERPL-MCNC: 166 MG/DL (ref 74–99)
HBA1C MFR BLD: 7.2 % (ref 4–5.6)
HCT VFR BLD AUTO: 39.1 % (ref 37–54)
HGB BLD-MCNC: 13 G/DL (ref 12.5–16.5)
IMM GRANULOCYTES # BLD AUTO: 0.04 K/UL (ref 0–0.58)
IMM GRANULOCYTES NFR BLD: 0 % (ref 0–5)
LYMPHOCYTES NFR BLD: 1.92 K/UL (ref 1.5–4)
LYMPHOCYTES RELATIVE PERCENT: 18 % (ref 20–42)
MAGNESIUM SERPL-MCNC: 1.6 MG/DL (ref 1.6–2.6)
MCH RBC QN AUTO: 31.4 PG (ref 26–35)
MCHC RBC AUTO-ENTMCNC: 33.2 G/DL (ref 32–34.5)
MCV RBC AUTO: 94.4 FL (ref 80–99.9)
MONOCYTES NFR BLD: 1.27 K/UL (ref 0.1–0.95)
MONOCYTES NFR BLD: 12 % (ref 2–12)
NEUTROPHILS NFR BLD: 62 % (ref 43–80)
NEUTS SEG NFR BLD: 6.57 K/UL (ref 1.8–7.3)
PHOSPHATE SERPL-MCNC: 2.7 MG/DL (ref 2.5–4.5)
PLATELET # BLD AUTO: 368 K/UL (ref 130–450)
PMV BLD AUTO: 9.3 FL (ref 7–12)
POTASSIUM SERPL-SCNC: 4 MMOL/L (ref 3.5–5.1)
PROT SERPL-MCNC: 6.6 G/DL (ref 6.4–8.3)
RBC # BLD AUTO: 4.14 M/UL (ref 3.8–5.8)
SODIUM SERPL-SCNC: 135 MMOL/L (ref 136–145)
WBC OTHER # BLD: 10.6 K/UL (ref 4.5–11.5)

## 2025-07-20 PROCEDURE — 6370000000 HC RX 637 (ALT 250 FOR IP): Performed by: STUDENT IN AN ORGANIZED HEALTH CARE EDUCATION/TRAINING PROGRAM

## 2025-07-20 PROCEDURE — 6370000000 HC RX 637 (ALT 250 FOR IP): Performed by: SPECIALIST

## 2025-07-20 PROCEDURE — 99291 CRITICAL CARE FIRST HOUR: CPT | Performed by: INTERNAL MEDICINE

## 2025-07-20 PROCEDURE — 99233 SBSQ HOSP IP/OBS HIGH 50: CPT | Performed by: INTERNAL MEDICINE

## 2025-07-20 PROCEDURE — 87327 CRYPTOCOCCUS NEOFORM AG IA: CPT

## 2025-07-20 PROCEDURE — 6370000000 HC RX 637 (ALT 250 FOR IP): Performed by: INTERNAL MEDICINE

## 2025-07-20 PROCEDURE — 6370000000 HC RX 637 (ALT 250 FOR IP)

## 2025-07-20 PROCEDURE — 86140 C-REACTIVE PROTEIN: CPT

## 2025-07-20 PROCEDURE — 83036 HEMOGLOBIN GLYCOSYLATED A1C: CPT

## 2025-07-20 PROCEDURE — 84100 ASSAY OF PHOSPHORUS: CPT

## 2025-07-20 PROCEDURE — 2500000003 HC RX 250 WO HCPCS: Performed by: STUDENT IN AN ORGANIZED HEALTH CARE EDUCATION/TRAINING PROGRAM

## 2025-07-20 PROCEDURE — 2000000000 HC ICU R&B

## 2025-07-20 PROCEDURE — 6360000002 HC RX W HCPCS: Performed by: INTERNAL MEDICINE

## 2025-07-20 PROCEDURE — 94660 CPAP INITIATION&MGMT: CPT

## 2025-07-20 PROCEDURE — 71045 X-RAY EXAM CHEST 1 VIEW: CPT

## 2025-07-20 PROCEDURE — 94640 AIRWAY INHALATION TREATMENT: CPT

## 2025-07-20 PROCEDURE — 85025 COMPLETE CBC W/AUTO DIFF WBC: CPT

## 2025-07-20 PROCEDURE — 6360000002 HC RX W HCPCS: Performed by: SPECIALIST

## 2025-07-20 PROCEDURE — 83735 ASSAY OF MAGNESIUM: CPT

## 2025-07-20 PROCEDURE — 85652 RBC SED RATE AUTOMATED: CPT

## 2025-07-20 PROCEDURE — 2060000000 HC ICU INTERMEDIATE R&B

## 2025-07-20 PROCEDURE — 2500000003 HC RX 250 WO HCPCS: Performed by: SPECIALIST

## 2025-07-20 PROCEDURE — 2700000000 HC OXYGEN THERAPY PER DAY

## 2025-07-20 PROCEDURE — 6360000002 HC RX W HCPCS

## 2025-07-20 PROCEDURE — 82962 GLUCOSE BLOOD TEST: CPT

## 2025-07-20 PROCEDURE — 80053 COMPREHEN METABOLIC PANEL: CPT

## 2025-07-20 RX ORDER — CYCLOBENZAPRINE HCL 5 MG
10 TABLET ORAL 3 TIMES DAILY PRN
Status: DISCONTINUED | OUTPATIENT
Start: 2025-07-20 | End: 2025-07-23 | Stop reason: HOSPADM

## 2025-07-20 RX ORDER — DULOXETIN HYDROCHLORIDE 30 MG/1
30 CAPSULE, DELAYED RELEASE ORAL DAILY
Status: DISCONTINUED | OUTPATIENT
Start: 2025-07-20 | End: 2025-07-23 | Stop reason: HOSPADM

## 2025-07-20 RX ORDER — CITALOPRAM HYDROBROMIDE 20 MG/1
10 TABLET ORAL DAILY
Status: DISCONTINUED | OUTPATIENT
Start: 2025-07-20 | End: 2025-07-20

## 2025-07-20 RX ORDER — PREDNISONE 20 MG/1
60 TABLET ORAL DAILY
Status: DISCONTINUED | OUTPATIENT
Start: 2025-07-20 | End: 2025-07-21

## 2025-07-20 RX ADMIN — PREDNISONE 60 MG: 20 TABLET ORAL at 11:57

## 2025-07-20 RX ADMIN — ACETAMINOPHEN 650 MG: 325 TABLET ORAL at 09:32

## 2025-07-20 RX ADMIN — BUDESONIDE 500 MCG: 0.5 SUSPENSION RESPIRATORY (INHALATION) at 18:07

## 2025-07-20 RX ADMIN — IPRATROPIUM BROMIDE AND ALBUTEROL SULFATE 1 DOSE: 2.5; .5 SOLUTION RESPIRATORY (INHALATION) at 18:07

## 2025-07-20 RX ADMIN — ACETAMINOPHEN 650 MG: 325 TABLET ORAL at 16:55

## 2025-07-20 RX ADMIN — APIXABAN 5 MG: 5 TABLET, FILM COATED ORAL at 21:11

## 2025-07-20 RX ADMIN — IPRATROPIUM BROMIDE AND ALBUTEROL SULFATE 1 DOSE: 2.5; .5 SOLUTION RESPIRATORY (INHALATION) at 12:40

## 2025-07-20 RX ADMIN — FUROSEMIDE 40 MG: 10 INJECTION, SOLUTION INTRAMUSCULAR; INTRAVENOUS at 08:06

## 2025-07-20 RX ADMIN — WATER 2000 MG: 1 INJECTION INTRAMUSCULAR; INTRAVENOUS; SUBCUTANEOUS at 21:10

## 2025-07-20 RX ADMIN — PANTOPRAZOLE SODIUM 40 MG: 40 TABLET, DELAYED RELEASE ORAL at 06:43

## 2025-07-20 RX ADMIN — INSULIN LISPRO 2 UNITS: 100 INJECTION, SOLUTION INTRAVENOUS; SUBCUTANEOUS at 16:56

## 2025-07-20 RX ADMIN — MUPIROCIN: 20 OINTMENT TOPICAL at 21:18

## 2025-07-20 RX ADMIN — IPRATROPIUM BROMIDE AND ALBUTEROL SULFATE 1 DOSE: 2.5; .5 SOLUTION RESPIRATORY (INHALATION) at 04:15

## 2025-07-20 RX ADMIN — CYCLOBENZAPRINE HYDROCHLORIDE 10 MG: 5 TABLET, FILM COATED ORAL at 13:13

## 2025-07-20 RX ADMIN — APIXABAN 5 MG: 5 TABLET, FILM COATED ORAL at 08:12

## 2025-07-20 RX ADMIN — IPRATROPIUM BROMIDE AND ALBUTEROL SULFATE 1 DOSE: 2.5; .5 SOLUTION RESPIRATORY (INHALATION) at 08:46

## 2025-07-20 RX ADMIN — SODIUM CHLORIDE, PRESERVATIVE FREE 10 ML: 5 INJECTION INTRAVENOUS at 21:11

## 2025-07-20 RX ADMIN — FLUCONAZOLE 400 MG: 100 TABLET ORAL at 08:12

## 2025-07-20 RX ADMIN — BUDESONIDE 500 MCG: 0.5 SUSPENSION RESPIRATORY (INHALATION) at 04:15

## 2025-07-20 RX ADMIN — FENTANYL CITRATE 25 MCG: 50 INJECTION INTRAMUSCULAR; INTRAVENOUS at 09:33

## 2025-07-20 RX ADMIN — SODIUM CHLORIDE, PRESERVATIVE FREE 10 ML: 5 INJECTION INTRAVENOUS at 08:07

## 2025-07-20 RX ADMIN — INSULIN LISPRO 2 UNITS: 100 INJECTION, SOLUTION INTRAVENOUS; SUBCUTANEOUS at 21:10

## 2025-07-20 RX ADMIN — LINEZOLID 600 MG: 600 INJECTION, SOLUTION INTRAVENOUS at 00:40

## 2025-07-20 RX ADMIN — DULOXETINE 30 MG: 30 CAPSULE, DELAYED RELEASE ORAL at 13:13

## 2025-07-20 RX ADMIN — MUPIROCIN: 20 OINTMENT TOPICAL at 08:10

## 2025-07-20 ASSESSMENT — PAIN DESCRIPTION - LOCATION
LOCATION: LEG;HIP
LOCATION: ARM;HIP

## 2025-07-20 ASSESSMENT — PAIN SCALES - GENERAL
PAINLEVEL_OUTOF10: 3
PAINLEVEL_OUTOF10: 0
PAINLEVEL_OUTOF10: 0
PAINLEVEL_OUTOF10: 3
PAINLEVEL_OUTOF10: 9
PAINLEVEL_OUTOF10: 0

## 2025-07-20 ASSESSMENT — PAIN DESCRIPTION - ORIENTATION
ORIENTATION: RIGHT;LEFT
ORIENTATION: LEFT

## 2025-07-20 ASSESSMENT — PAIN DESCRIPTION - DESCRIPTORS
DESCRIPTORS: ACHING;DISCOMFORT
DESCRIPTORS: ACHING;DISCOMFORT

## 2025-07-21 ENCOUNTER — APPOINTMENT (OUTPATIENT)
Dept: GENERAL RADIOLOGY | Age: 50
DRG: 917 | End: 2025-07-21
Payer: MEDICARE

## 2025-07-21 LAB
ALBUMIN SERPL-MCNC: 3.7 G/DL (ref 3.5–5.2)
ALP SERPL-CCNC: 172 U/L (ref 40–129)
ALT SERPL-CCNC: 31 U/L (ref 0–50)
ANION GAP SERPL CALCULATED.3IONS-SCNC: 11 MMOL/L (ref 7–16)
AST SERPL-CCNC: 18 U/L (ref 0–50)
BASOPHILS # BLD: 0.04 K/UL (ref 0–0.2)
BASOPHILS NFR BLD: 0 % (ref 0–2)
BILIRUB SERPL-MCNC: 0.3 MG/DL (ref 0–1.2)
BUN SERPL-MCNC: 10 MG/DL (ref 6–20)
CALCIUM SERPL-MCNC: 10.1 MG/DL (ref 8.6–10)
CHLORIDE SERPL-SCNC: 99 MMOL/L (ref 98–107)
CO2 SERPL-SCNC: 28 MMOL/L (ref 22–29)
CREAT SERPL-MCNC: 0.7 MG/DL (ref 0.7–1.2)
CRYPTOC AG SER QL: NEGATIVE
EOSINOPHIL # BLD: 0.21 K/UL (ref 0.05–0.5)
EOSINOPHILS RELATIVE PERCENT: 2 % (ref 0–6)
ERYTHROCYTE [DISTWIDTH] IN BLOOD BY AUTOMATED COUNT: 13.9 % (ref 11.5–15)
GFR, ESTIMATED: >90 ML/MIN/1.73M2
GLUCOSE BLD-MCNC: 206 MG/DL (ref 74–99)
GLUCOSE BLD-MCNC: 253 MG/DL (ref 74–99)
GLUCOSE BLD-MCNC: 266 MG/DL (ref 74–99)
GLUCOSE SERPL-MCNC: 179 MG/DL (ref 74–99)
HCT VFR BLD AUTO: 40.4 % (ref 37–54)
HGB BLD-MCNC: 13.6 G/DL (ref 12.5–16.5)
IMM GRANULOCYTES # BLD AUTO: 0.04 K/UL (ref 0–0.58)
IMM GRANULOCYTES NFR BLD: 0 % (ref 0–5)
LYMPHOCYTES NFR BLD: 1.85 K/UL (ref 1.5–4)
LYMPHOCYTES RELATIVE PERCENT: 17 % (ref 20–42)
MAGNESIUM SERPL-MCNC: 1.7 MG/DL (ref 1.6–2.6)
MCH RBC QN AUTO: 31.3 PG (ref 26–35)
MCHC RBC AUTO-ENTMCNC: 33.7 G/DL (ref 32–34.5)
MCV RBC AUTO: 93.1 FL (ref 80–99.9)
MONOCYTES NFR BLD: 1.24 K/UL (ref 0.1–0.95)
MONOCYTES NFR BLD: 12 % (ref 2–12)
NEUTROPHILS NFR BLD: 69 % (ref 43–80)
NEUTS SEG NFR BLD: 7.41 K/UL (ref 1.8–7.3)
PHOSPHATE SERPL-MCNC: 3.1 MG/DL (ref 2.5–4.5)
PLATELET # BLD AUTO: 418 K/UL (ref 130–450)
PMV BLD AUTO: 9.1 FL (ref 7–12)
POTASSIUM SERPL-SCNC: 4.2 MMOL/L (ref 3.5–5.1)
PROT SERPL-MCNC: 6.9 G/DL (ref 6.4–8.3)
RBC # BLD AUTO: 4.34 M/UL (ref 3.8–5.8)
SODIUM SERPL-SCNC: 137 MMOL/L (ref 136–145)
WBC OTHER # BLD: 10.8 K/UL (ref 4.5–11.5)

## 2025-07-21 PROCEDURE — 6360000002 HC RX W HCPCS: Performed by: SPECIALIST

## 2025-07-21 PROCEDURE — 99232 SBSQ HOSP IP/OBS MODERATE 35: CPT | Performed by: INTERNAL MEDICINE

## 2025-07-21 PROCEDURE — 6370000000 HC RX 637 (ALT 250 FOR IP): Performed by: INTERNAL MEDICINE

## 2025-07-21 PROCEDURE — 6370000000 HC RX 637 (ALT 250 FOR IP): Performed by: STUDENT IN AN ORGANIZED HEALTH CARE EDUCATION/TRAINING PROGRAM

## 2025-07-21 PROCEDURE — 6360000002 HC RX W HCPCS: Performed by: INTERNAL MEDICINE

## 2025-07-21 PROCEDURE — 6360000002 HC RX W HCPCS

## 2025-07-21 PROCEDURE — 99233 SBSQ HOSP IP/OBS HIGH 50: CPT | Performed by: INTERNAL MEDICINE

## 2025-07-21 PROCEDURE — 71045 X-RAY EXAM CHEST 1 VIEW: CPT

## 2025-07-21 PROCEDURE — 6370000000 HC RX 637 (ALT 250 FOR IP)

## 2025-07-21 PROCEDURE — 83735 ASSAY OF MAGNESIUM: CPT

## 2025-07-21 PROCEDURE — 82962 GLUCOSE BLOOD TEST: CPT

## 2025-07-21 PROCEDURE — 2060000000 HC ICU INTERMEDIATE R&B

## 2025-07-21 PROCEDURE — 84100 ASSAY OF PHOSPHORUS: CPT

## 2025-07-21 PROCEDURE — 2500000003 HC RX 250 WO HCPCS: Performed by: STUDENT IN AN ORGANIZED HEALTH CARE EDUCATION/TRAINING PROGRAM

## 2025-07-21 PROCEDURE — 6370000000 HC RX 637 (ALT 250 FOR IP): Performed by: SPECIALIST

## 2025-07-21 PROCEDURE — 80053 COMPREHEN METABOLIC PANEL: CPT

## 2025-07-21 PROCEDURE — 97110 THERAPEUTIC EXERCISES: CPT | Performed by: PHYSICAL THERAPIST

## 2025-07-21 PROCEDURE — 94660 CPAP INITIATION&MGMT: CPT

## 2025-07-21 PROCEDURE — 2700000000 HC OXYGEN THERAPY PER DAY

## 2025-07-21 PROCEDURE — 94640 AIRWAY INHALATION TREATMENT: CPT

## 2025-07-21 PROCEDURE — 97530 THERAPEUTIC ACTIVITIES: CPT | Performed by: PHYSICAL THERAPIST

## 2025-07-21 PROCEDURE — 85025 COMPLETE CBC W/AUTO DIFF WBC: CPT

## 2025-07-21 PROCEDURE — 2500000003 HC RX 250 WO HCPCS: Performed by: SPECIALIST

## 2025-07-21 RX ORDER — PREDNISONE 20 MG/1
40 TABLET ORAL DAILY
Status: DISCONTINUED | OUTPATIENT
Start: 2025-07-22 | End: 2025-07-23 | Stop reason: HOSPADM

## 2025-07-21 RX ORDER — MAGNESIUM SULFATE IN WATER 40 MG/ML
2000 INJECTION, SOLUTION INTRAVENOUS ONCE
Status: COMPLETED | OUTPATIENT
Start: 2025-07-21 | End: 2025-07-21

## 2025-07-21 RX ORDER — FUROSEMIDE 20 MG/1
20 TABLET ORAL DAILY
Status: DISCONTINUED | OUTPATIENT
Start: 2025-07-22 | End: 2025-07-23 | Stop reason: HOSPADM

## 2025-07-21 RX ADMIN — INSULIN LISPRO 2 UNITS: 100 INJECTION, SOLUTION INTRAVENOUS; SUBCUTANEOUS at 11:40

## 2025-07-21 RX ADMIN — BUDESONIDE 500 MCG: 0.5 SUSPENSION RESPIRATORY (INHALATION) at 16:50

## 2025-07-21 RX ADMIN — PREDNISONE 60 MG: 20 TABLET ORAL at 08:42

## 2025-07-21 RX ADMIN — WATER 2000 MG: 1 INJECTION INTRAMUSCULAR; INTRAVENOUS; SUBCUTANEOUS at 21:27

## 2025-07-21 RX ADMIN — PANTOPRAZOLE SODIUM 40 MG: 40 TABLET, DELAYED RELEASE ORAL at 06:34

## 2025-07-21 RX ADMIN — CYANOCOBALAMIN 1000 MCG: 1000 INJECTION, SOLUTION INTRAMUSCULAR; SUBCUTANEOUS at 08:43

## 2025-07-21 RX ADMIN — SODIUM CHLORIDE, PRESERVATIVE FREE 10 ML: 5 INJECTION INTRAVENOUS at 21:38

## 2025-07-21 RX ADMIN — SODIUM CHLORIDE, PRESERVATIVE FREE 10 ML: 5 INJECTION INTRAVENOUS at 08:51

## 2025-07-21 RX ADMIN — INSULIN LISPRO 4 UNITS: 100 INJECTION, SOLUTION INTRAVENOUS; SUBCUTANEOUS at 21:33

## 2025-07-21 RX ADMIN — MUPIROCIN: 20 OINTMENT TOPICAL at 09:57

## 2025-07-21 RX ADMIN — APIXABAN 5 MG: 5 TABLET, FILM COATED ORAL at 08:42

## 2025-07-21 RX ADMIN — MAGNESIUM SULFATE HEPTAHYDRATE 2000 MG: 40 INJECTION, SOLUTION INTRAVENOUS at 08:48

## 2025-07-21 RX ADMIN — FENTANYL CITRATE 25 MCG: 50 INJECTION INTRAMUSCULAR; INTRAVENOUS at 19:59

## 2025-07-21 RX ADMIN — MUPIROCIN: 20 OINTMENT TOPICAL at 21:27

## 2025-07-21 RX ADMIN — DULOXETINE 30 MG: 30 CAPSULE, DELAYED RELEASE ORAL at 10:28

## 2025-07-21 RX ADMIN — IPRATROPIUM BROMIDE AND ALBUTEROL SULFATE 1 DOSE: 2.5; .5 SOLUTION RESPIRATORY (INHALATION) at 05:01

## 2025-07-21 RX ADMIN — IPRATROPIUM BROMIDE AND ALBUTEROL SULFATE 1 DOSE: 2.5; .5 SOLUTION RESPIRATORY (INHALATION) at 09:17

## 2025-07-21 RX ADMIN — IPRATROPIUM BROMIDE AND ALBUTEROL SULFATE 1 DOSE: 2.5; .5 SOLUTION RESPIRATORY (INHALATION) at 16:50

## 2025-07-21 RX ADMIN — Medication 6 MG: at 21:27

## 2025-07-21 RX ADMIN — FUROSEMIDE 40 MG: 10 INJECTION, SOLUTION INTRAMUSCULAR; INTRAVENOUS at 08:43

## 2025-07-21 RX ADMIN — BUDESONIDE 500 MCG: 0.5 SUSPENSION RESPIRATORY (INHALATION) at 05:01

## 2025-07-21 RX ADMIN — APIXABAN 5 MG: 5 TABLET, FILM COATED ORAL at 21:27

## 2025-07-21 RX ADMIN — INSULIN LISPRO 4 UNITS: 100 INJECTION, SOLUTION INTRAVENOUS; SUBCUTANEOUS at 16:54

## 2025-07-21 RX ADMIN — FLUCONAZOLE 400 MG: 100 TABLET ORAL at 08:42

## 2025-07-21 ASSESSMENT — PAIN DESCRIPTION - DESCRIPTORS
DESCRIPTORS: STABBING;DISCOMFORT;ACHING
DESCRIPTORS: STABBING;DISCOMFORT;ACHING

## 2025-07-21 ASSESSMENT — ENCOUNTER SYMPTOMS
WHEEZING: 0
CHEST TIGHTNESS: 0
ABDOMINAL DISTENTION: 0
NAUSEA: 0
DIARRHEA: 0
ABDOMINAL PAIN: 0
RHINORRHEA: 0
FACIAL SWELLING: 0
SHORTNESS OF BREATH: 0
SINUS PAIN: 0
CONSTIPATION: 0
BLOOD IN STOOL: 0

## 2025-07-21 ASSESSMENT — PAIN SCALES - GENERAL
PAINLEVEL_OUTOF10: 7
PAINLEVEL_OUTOF10: 0
PAINLEVEL_OUTOF10: 8
PAINLEVEL_OUTOF10: 8

## 2025-07-21 ASSESSMENT — PAIN DESCRIPTION - LOCATION
LOCATION: ARM;HIP
LOCATION: ARM;HIP

## 2025-07-21 NOTE — CARE COORDINATION
7/21/2025 Transfer to [facility]MC/PCU, 4lnc Rocephin IV. Pts Zyvox completed. Patient to return to [facility] prior to auth being obtained. Pt will need a signed MILANA. PAS for transport to SNF CM/SS assigned to follow. .Electronically signed by LUIS DANIEL Casanova on 7/21/2025 at 9:57 AM

## 2025-07-22 ENCOUNTER — APPOINTMENT (OUTPATIENT)
Dept: GENERAL RADIOLOGY | Age: 50
DRG: 917 | End: 2025-07-22
Payer: MEDICARE

## 2025-07-22 LAB
ALBUMIN SERPL-MCNC: 3.6 G/DL (ref 3.5–5.2)
ALP SERPL-CCNC: 155 U/L (ref 40–129)
ALT SERPL-CCNC: 25 U/L (ref 0–50)
ANION GAP SERPL CALCULATED.3IONS-SCNC: 11 MMOL/L (ref 7–16)
AST SERPL-CCNC: 22 U/L (ref 0–50)
BASOPHILS # BLD: 0.09 K/UL (ref 0–0.2)
BASOPHILS NFR BLD: 1 % (ref 0–2)
BILIRUB SERPL-MCNC: 0.3 MG/DL (ref 0–1.2)
BUN SERPL-MCNC: 13 MG/DL (ref 6–20)
CALCIUM SERPL-MCNC: 9.3 MG/DL (ref 8.6–10)
CHLORIDE SERPL-SCNC: 99 MMOL/L (ref 98–107)
CO2 SERPL-SCNC: 28 MMOL/L (ref 22–29)
CREAT SERPL-MCNC: 0.8 MG/DL (ref 0.7–1.2)
EOSINOPHIL # BLD: 0.36 K/UL (ref 0.05–0.5)
EOSINOPHILS RELATIVE PERCENT: 3 % (ref 0–6)
ERYTHROCYTE [DISTWIDTH] IN BLOOD BY AUTOMATED COUNT: 14.2 % (ref 11.5–15)
GFR, ESTIMATED: >90 ML/MIN/1.73M2
GLUCOSE BLD-MCNC: 131 MG/DL (ref 74–99)
GLUCOSE BLD-MCNC: 167 MG/DL (ref 74–99)
GLUCOSE BLD-MCNC: 245 MG/DL (ref 74–99)
GLUCOSE BLD-MCNC: 292 MG/DL (ref 74–99)
GLUCOSE SERPL-MCNC: 146 MG/DL (ref 74–99)
HCT VFR BLD AUTO: 38.5 % (ref 37–54)
HGB BLD-MCNC: 13 G/DL (ref 12.5–16.5)
IMM GRANULOCYTES # BLD AUTO: 0.06 K/UL (ref 0–0.58)
IMM GRANULOCYTES NFR BLD: 1 % (ref 0–5)
LYMPHOCYTES NFR BLD: 3.29 K/UL (ref 1.5–4)
LYMPHOCYTES RELATIVE PERCENT: 28 % (ref 20–42)
MAGNESIUM SERPL-MCNC: 1.7 MG/DL (ref 1.6–2.6)
MCH RBC QN AUTO: 31.8 PG (ref 26–35)
MCHC RBC AUTO-ENTMCNC: 33.8 G/DL (ref 32–34.5)
MCV RBC AUTO: 94.1 FL (ref 80–99.9)
MICROORGANISM SPEC CULT: NORMAL
MICROORGANISM SPEC CULT: NORMAL
MONOCYTES NFR BLD: 1.39 K/UL (ref 0.1–0.95)
MONOCYTES NFR BLD: 12 % (ref 2–12)
NEUTROPHILS NFR BLD: 56 % (ref 43–80)
NEUTS SEG NFR BLD: 6.73 K/UL (ref 1.8–7.3)
PHOSPHATE SERPL-MCNC: 2.9 MG/DL (ref 2.5–4.5)
PLATELET # BLD AUTO: 422 K/UL (ref 130–450)
PMV BLD AUTO: 9.4 FL (ref 7–12)
POTASSIUM SERPL-SCNC: 3.6 MMOL/L (ref 3.5–5.1)
PROT SERPL-MCNC: 6.8 G/DL (ref 6.4–8.3)
RBC # BLD AUTO: 4.09 M/UL (ref 3.8–5.8)
SERVICE CMNT-IMP: NORMAL
SERVICE CMNT-IMP: NORMAL
SODIUM SERPL-SCNC: 139 MMOL/L (ref 136–145)
SPECIMEN DESCRIPTION: NORMAL
SPECIMEN DESCRIPTION: NORMAL
WBC OTHER # BLD: 11.9 K/UL (ref 4.5–11.5)

## 2025-07-22 PROCEDURE — 82962 GLUCOSE BLOOD TEST: CPT

## 2025-07-22 PROCEDURE — 6370000000 HC RX 637 (ALT 250 FOR IP)

## 2025-07-22 PROCEDURE — 6370000000 HC RX 637 (ALT 250 FOR IP): Performed by: STUDENT IN AN ORGANIZED HEALTH CARE EDUCATION/TRAINING PROGRAM

## 2025-07-22 PROCEDURE — 6360000002 HC RX W HCPCS: Performed by: INTERNAL MEDICINE

## 2025-07-22 PROCEDURE — 99232 SBSQ HOSP IP/OBS MODERATE 35: CPT | Performed by: INTERNAL MEDICINE

## 2025-07-22 PROCEDURE — 2500000003 HC RX 250 WO HCPCS: Performed by: STUDENT IN AN ORGANIZED HEALTH CARE EDUCATION/TRAINING PROGRAM

## 2025-07-22 PROCEDURE — 71045 X-RAY EXAM CHEST 1 VIEW: CPT

## 2025-07-22 PROCEDURE — 97110 THERAPEUTIC EXERCISES: CPT | Performed by: PHYSICAL THERAPIST

## 2025-07-22 PROCEDURE — 36415 COLL VENOUS BLD VENIPUNCTURE: CPT

## 2025-07-22 PROCEDURE — 6370000000 HC RX 637 (ALT 250 FOR IP): Performed by: SPECIALIST

## 2025-07-22 PROCEDURE — 2060000000 HC ICU INTERMEDIATE R&B

## 2025-07-22 PROCEDURE — 83735 ASSAY OF MAGNESIUM: CPT

## 2025-07-22 PROCEDURE — 6360000002 HC RX W HCPCS

## 2025-07-22 PROCEDURE — 85025 COMPLETE CBC W/AUTO DIFF WBC: CPT

## 2025-07-22 PROCEDURE — 2700000000 HC OXYGEN THERAPY PER DAY

## 2025-07-22 PROCEDURE — 94640 AIRWAY INHALATION TREATMENT: CPT

## 2025-07-22 PROCEDURE — 99233 SBSQ HOSP IP/OBS HIGH 50: CPT | Performed by: INTERNAL MEDICINE

## 2025-07-22 PROCEDURE — 6370000000 HC RX 637 (ALT 250 FOR IP): Performed by: INTERNAL MEDICINE

## 2025-07-22 PROCEDURE — 84100 ASSAY OF PHOSPHORUS: CPT

## 2025-07-22 PROCEDURE — 80053 COMPREHEN METABOLIC PANEL: CPT

## 2025-07-22 PROCEDURE — 97530 THERAPEUTIC ACTIVITIES: CPT | Performed by: PHYSICAL THERAPIST

## 2025-07-22 RX ORDER — DOXYCYCLINE 100 MG/1
100 CAPSULE ORAL EVERY 12 HOURS SCHEDULED
Status: DISCONTINUED | OUTPATIENT
Start: 2025-07-22 | End: 2025-07-23 | Stop reason: HOSPADM

## 2025-07-22 RX ORDER — DOXYCYCLINE 100 MG/1
100 CAPSULE ORAL EVERY 12 HOURS SCHEDULED
Qty: 6 CAPSULE | Refills: 0 | Status: SHIPPED | OUTPATIENT
Start: 2025-07-22 | End: 2025-07-25

## 2025-07-22 RX ORDER — LEVOFLOXACIN 750 MG/1
750 TABLET, FILM COATED ORAL DAILY
Status: DISCONTINUED | OUTPATIENT
Start: 2025-07-22 | End: 2025-07-23 | Stop reason: HOSPADM

## 2025-07-22 RX ORDER — OXYCODONE HYDROCHLORIDE 5 MG/1
5 TABLET ORAL EVERY 6 HOURS PRN
Refills: 0 | Status: DISCONTINUED | OUTPATIENT
Start: 2025-07-22 | End: 2025-07-23 | Stop reason: HOSPADM

## 2025-07-22 RX ORDER — LEVOFLOXACIN 750 MG/1
750 TABLET, FILM COATED ORAL DAILY
Qty: 3 TABLET | Refills: 0 | Status: SHIPPED | OUTPATIENT
Start: 2025-07-22 | End: 2025-07-25

## 2025-07-22 RX ORDER — MUPIROCIN 2 %
OINTMENT (GRAM) TOPICAL
Qty: 1 EACH | Refills: 0 | Status: SHIPPED | OUTPATIENT
Start: 2025-07-22

## 2025-07-22 RX ADMIN — PANTOPRAZOLE SODIUM 40 MG: 40 TABLET, DELAYED RELEASE ORAL at 05:53

## 2025-07-22 RX ADMIN — APIXABAN 5 MG: 5 TABLET, FILM COATED ORAL at 20:25

## 2025-07-22 RX ADMIN — MUPIROCIN: 20 OINTMENT TOPICAL at 20:26

## 2025-07-22 RX ADMIN — BUDESONIDE 500 MCG: 0.5 SUSPENSION RESPIRATORY (INHALATION) at 18:09

## 2025-07-22 RX ADMIN — LEVOFLOXACIN 750 MG: 750 TABLET, FILM COATED ORAL at 16:14

## 2025-07-22 RX ADMIN — MUPIROCIN: 20 OINTMENT TOPICAL at 08:59

## 2025-07-22 RX ADMIN — SODIUM CHLORIDE, PRESERVATIVE FREE 10 ML: 5 INJECTION INTRAVENOUS at 20:26

## 2025-07-22 RX ADMIN — INSULIN LISPRO 2 UNITS: 100 INJECTION, SOLUTION INTRAVENOUS; SUBCUTANEOUS at 16:14

## 2025-07-22 RX ADMIN — FUROSEMIDE 20 MG: 20 TABLET ORAL at 08:58

## 2025-07-22 RX ADMIN — PREDNISONE 40 MG: 20 TABLET ORAL at 08:58

## 2025-07-22 RX ADMIN — INSULIN LISPRO 4 UNITS: 100 INJECTION, SOLUTION INTRAVENOUS; SUBCUTANEOUS at 20:21

## 2025-07-22 RX ADMIN — SODIUM CHLORIDE, PRESERVATIVE FREE 10 ML: 5 INJECTION INTRAVENOUS at 08:58

## 2025-07-22 RX ADMIN — ACETAMINOPHEN 650 MG: 325 TABLET ORAL at 16:14

## 2025-07-22 RX ADMIN — APIXABAN 5 MG: 5 TABLET, FILM COATED ORAL at 08:58

## 2025-07-22 RX ADMIN — DOXYCYCLINE HYCLATE 100 MG: 100 CAPSULE ORAL at 20:25

## 2025-07-22 RX ADMIN — CYCLOBENZAPRINE HYDROCHLORIDE 10 MG: 5 TABLET, FILM COATED ORAL at 20:24

## 2025-07-22 RX ADMIN — IPRATROPIUM BROMIDE AND ALBUTEROL SULFATE 1 DOSE: 2.5; .5 SOLUTION RESPIRATORY (INHALATION) at 10:07

## 2025-07-22 RX ADMIN — OXYCODONE 5 MG: 5 TABLET ORAL at 20:26

## 2025-07-22 RX ADMIN — IPRATROPIUM BROMIDE AND ALBUTEROL SULFATE 1 DOSE: 2.5; .5 SOLUTION RESPIRATORY (INHALATION) at 18:09

## 2025-07-22 RX ADMIN — FLUCONAZOLE 400 MG: 100 TABLET ORAL at 08:58

## 2025-07-22 RX ADMIN — Medication 6 MG: at 20:25

## 2025-07-22 RX ADMIN — IPRATROPIUM BROMIDE AND ALBUTEROL SULFATE 1 DOSE: 2.5; .5 SOLUTION RESPIRATORY (INHALATION) at 05:19

## 2025-07-22 RX ADMIN — FENTANYL CITRATE 25 MCG: 50 INJECTION INTRAMUSCULAR; INTRAVENOUS at 04:50

## 2025-07-22 RX ADMIN — DULOXETINE 30 MG: 30 CAPSULE, DELAYED RELEASE ORAL at 08:58

## 2025-07-22 RX ADMIN — BUDESONIDE 500 MCG: 0.5 SUSPENSION RESPIRATORY (INHALATION) at 05:19

## 2025-07-22 ASSESSMENT — PAIN SCALES - GENERAL
PAINLEVEL_OUTOF10: 3
PAINLEVEL_OUTOF10: 7
PAINLEVEL_OUTOF10: 4
PAINLEVEL_OUTOF10: 9
PAINLEVEL_OUTOF10: 9

## 2025-07-22 ASSESSMENT — PAIN DESCRIPTION - DESCRIPTORS
DESCRIPTORS: ACHING;DISCOMFORT
DESCRIPTORS: DISCOMFORT;STABBING
DESCRIPTORS: THROBBING;SHARP

## 2025-07-22 ASSESSMENT — ENCOUNTER SYMPTOMS
FACIAL SWELLING: 0
ABDOMINAL DISTENTION: 0
ABDOMINAL PAIN: 0
CONSTIPATION: 0
NAUSEA: 0
SINUS PAIN: 0
CHEST TIGHTNESS: 0
DIARRHEA: 0
WHEEZING: 0
RHINORRHEA: 0
SHORTNESS OF BREATH: 0
BLOOD IN STOOL: 0

## 2025-07-22 ASSESSMENT — PAIN DESCRIPTION - LOCATION
LOCATION: ARM;HIP
LOCATION: HIP
LOCATION: BACK;LEG

## 2025-07-22 ASSESSMENT — PAIN - FUNCTIONAL ASSESSMENT: PAIN_FUNCTIONAL_ASSESSMENT: ACTIVITIES ARE NOT PREVENTED

## 2025-07-22 ASSESSMENT — PAIN DESCRIPTION - ORIENTATION: ORIENTATION: RIGHT;LEFT

## 2025-07-22 NOTE — CARE COORDINATION
7/22/2025 1315 CM Note:  Pt is from [facility] and plan is to return.  WILL NEED PRECERT and a Signed MILANA.  Need final ID plan.  CM will follow. Electronically signed by Joy Coto RN on 7/22/2025 at 1:26 PM

## 2025-07-23 ENCOUNTER — APPOINTMENT (OUTPATIENT)
Dept: GENERAL RADIOLOGY | Age: 50
DRG: 917 | End: 2025-07-23
Payer: MEDICARE

## 2025-07-23 VITALS
HEIGHT: 73 IN | BODY MASS INDEX: 39.33 KG/M2 | WEIGHT: 296.74 LBS | TEMPERATURE: 99.5 F | SYSTOLIC BLOOD PRESSURE: 123 MMHG | DIASTOLIC BLOOD PRESSURE: 84 MMHG | OXYGEN SATURATION: 93 % | HEART RATE: 97 BPM | RESPIRATION RATE: 18 BRPM

## 2025-07-23 LAB
ALBUMIN SERPL-MCNC: 3.6 G/DL (ref 3.5–5.2)
ALP SERPL-CCNC: 165 U/L (ref 40–129)
ALT SERPL-CCNC: 24 U/L (ref 0–50)
ANION GAP SERPL CALCULATED.3IONS-SCNC: 12 MMOL/L (ref 7–16)
AST SERPL-CCNC: 26 U/L (ref 0–50)
BASOPHILS # BLD: 0.11 K/UL (ref 0–0.2)
BASOPHILS NFR BLD: 1 % (ref 0–2)
BILIRUB SERPL-MCNC: 0.3 MG/DL (ref 0–1.2)
BUN SERPL-MCNC: 15 MG/DL (ref 6–20)
CALCIUM SERPL-MCNC: 9.4 MG/DL (ref 8.6–10)
CHLORIDE SERPL-SCNC: 97 MMOL/L (ref 98–107)
CO2 SERPL-SCNC: 30 MMOL/L (ref 22–29)
CREAT SERPL-MCNC: 0.8 MG/DL (ref 0.7–1.2)
EOSINOPHIL # BLD: 0.34 K/UL (ref 0.05–0.5)
EOSINOPHILS RELATIVE PERCENT: 3 % (ref 0–6)
ERYTHROCYTE [DISTWIDTH] IN BLOOD BY AUTOMATED COUNT: 13.8 % (ref 11.5–15)
GFR, ESTIMATED: >90 ML/MIN/1.73M2
GLUCOSE BLD-MCNC: 117 MG/DL (ref 74–99)
GLUCOSE BLD-MCNC: 173 MG/DL (ref 74–99)
GLUCOSE SERPL-MCNC: 120 MG/DL (ref 74–99)
HCT VFR BLD AUTO: 40.2 % (ref 37–54)
HGB BLD-MCNC: 13.4 G/DL (ref 12.5–16.5)
LYMPHOCYTES NFR BLD: 4.6 K/UL (ref 1.5–4)
LYMPHOCYTES RELATIVE PERCENT: 36 % (ref 20–42)
MAGNESIUM SERPL-MCNC: 1.6 MG/DL (ref 1.6–2.6)
MCH RBC QN AUTO: 32.1 PG (ref 26–35)
MCHC RBC AUTO-ENTMCNC: 33.3 G/DL (ref 32–34.5)
MCV RBC AUTO: 96.2 FL (ref 80–99.9)
MONOCYTES NFR BLD: 1.46 K/UL (ref 0.1–0.95)
MONOCYTES NFR BLD: 11 % (ref 2–12)
MYELOCYTES ABSOLUTE COUNT: 0.11 K/UL
MYELOCYTES: 1 %
NEUTROPHILS NFR BLD: 49 % (ref 43–80)
NEUTS SEG NFR BLD: 6.28 K/UL (ref 1.8–7.3)
PHOSPHATE SERPL-MCNC: 2.7 MG/DL (ref 2.5–4.5)
PLATELET # BLD AUTO: 443 K/UL (ref 130–450)
PMV BLD AUTO: 9.5 FL (ref 7–12)
POTASSIUM SERPL-SCNC: 4.1 MMOL/L (ref 3.5–5.1)
PROT SERPL-MCNC: 6.8 G/DL (ref 6.4–8.3)
RBC # BLD AUTO: 4.18 M/UL (ref 3.8–5.8)
RBC # BLD: ABNORMAL 10*6/UL
SODIUM SERPL-SCNC: 139 MMOL/L (ref 136–145)
WBC OTHER # BLD: 12.9 K/UL (ref 4.5–11.5)

## 2025-07-23 PROCEDURE — 83735 ASSAY OF MAGNESIUM: CPT

## 2025-07-23 PROCEDURE — 2500000003 HC RX 250 WO HCPCS: Performed by: STUDENT IN AN ORGANIZED HEALTH CARE EDUCATION/TRAINING PROGRAM

## 2025-07-23 PROCEDURE — 85025 COMPLETE CBC W/AUTO DIFF WBC: CPT

## 2025-07-23 PROCEDURE — 6370000000 HC RX 637 (ALT 250 FOR IP): Performed by: INTERNAL MEDICINE

## 2025-07-23 PROCEDURE — 94640 AIRWAY INHALATION TREATMENT: CPT

## 2025-07-23 PROCEDURE — 36415 COLL VENOUS BLD VENIPUNCTURE: CPT

## 2025-07-23 PROCEDURE — 6370000000 HC RX 637 (ALT 250 FOR IP): Performed by: STUDENT IN AN ORGANIZED HEALTH CARE EDUCATION/TRAINING PROGRAM

## 2025-07-23 PROCEDURE — 99239 HOSP IP/OBS DSCHRG MGMT >30: CPT | Performed by: INTERNAL MEDICINE

## 2025-07-23 PROCEDURE — 6370000000 HC RX 637 (ALT 250 FOR IP)

## 2025-07-23 PROCEDURE — 97530 THERAPEUTIC ACTIVITIES: CPT

## 2025-07-23 PROCEDURE — 99232 SBSQ HOSP IP/OBS MODERATE 35: CPT | Performed by: INTERNAL MEDICINE

## 2025-07-23 PROCEDURE — 6360000002 HC RX W HCPCS

## 2025-07-23 PROCEDURE — 71045 X-RAY EXAM CHEST 1 VIEW: CPT

## 2025-07-23 PROCEDURE — 82962 GLUCOSE BLOOD TEST: CPT

## 2025-07-23 PROCEDURE — 84100 ASSAY OF PHOSPHORUS: CPT

## 2025-07-23 PROCEDURE — 6370000000 HC RX 637 (ALT 250 FOR IP): Performed by: SPECIALIST

## 2025-07-23 PROCEDURE — 97530 THERAPEUTIC ACTIVITIES: CPT | Performed by: PHYSICAL THERAPIST

## 2025-07-23 PROCEDURE — 80053 COMPREHEN METABOLIC PANEL: CPT

## 2025-07-23 RX ORDER — ERGOCALCIFEROL 1.25 MG/1
50000 CAPSULE ORAL WEEKLY
Qty: 5 CAPSULE | Refills: 1 | Status: SHIPPED
Start: 2025-07-25

## 2025-07-23 RX ORDER — BUDESONIDE 0.5 MG/2ML
0.5 INHALANT ORAL
Qty: 60 EACH | Refills: 0 | Status: SHIPPED
Start: 2025-07-23

## 2025-07-23 RX ORDER — DULOXETIN HYDROCHLORIDE 30 MG/1
30 CAPSULE, DELAYED RELEASE ORAL DAILY
Qty: 30 CAPSULE | Refills: 1 | Status: SHIPPED
Start: 2025-07-24

## 2025-07-23 RX ORDER — CYANOCOBALAMIN 1000 UG/ML
INJECTION, SOLUTION INTRAMUSCULAR; SUBCUTANEOUS
Qty: 4 ML | Refills: 0 | Status: SHIPPED
Start: 2025-07-28 | End: 2025-10-10

## 2025-07-23 RX ORDER — PANTOPRAZOLE SODIUM 40 MG/1
40 TABLET, DELAYED RELEASE ORAL
Qty: 30 TABLET | Refills: 0 | Status: SHIPPED
Start: 2025-07-24

## 2025-07-23 RX ORDER — IPRATROPIUM BROMIDE AND ALBUTEROL SULFATE 2.5; .5 MG/3ML; MG/3ML
3 SOLUTION RESPIRATORY (INHALATION)
Qty: 360 ML | Refills: 0 | Status: SHIPPED
Start: 2025-07-23

## 2025-07-23 RX ORDER — PREDNISONE 10 MG/1
TABLET ORAL
Qty: 32 TABLET | Refills: 0 | Status: SHIPPED | OUTPATIENT
Start: 2025-07-24 | End: 2025-08-08

## 2025-07-23 RX ADMIN — OXYCODONE 5 MG: 5 TABLET ORAL at 03:36

## 2025-07-23 RX ADMIN — PREDNISONE 40 MG: 20 TABLET ORAL at 08:43

## 2025-07-23 RX ADMIN — BUDESONIDE 500 MCG: 0.5 SUSPENSION RESPIRATORY (INHALATION) at 06:25

## 2025-07-23 RX ADMIN — IPRATROPIUM BROMIDE AND ALBUTEROL SULFATE 1 DOSE: 2.5; .5 SOLUTION RESPIRATORY (INHALATION) at 09:40

## 2025-07-23 RX ADMIN — APIXABAN 5 MG: 5 TABLET, FILM COATED ORAL at 08:43

## 2025-07-23 RX ADMIN — FUROSEMIDE 20 MG: 20 TABLET ORAL at 08:43

## 2025-07-23 RX ADMIN — LEVOFLOXACIN 750 MG: 750 TABLET, FILM COATED ORAL at 08:43

## 2025-07-23 RX ADMIN — FLUCONAZOLE 400 MG: 100 TABLET ORAL at 08:43

## 2025-07-23 RX ADMIN — MUPIROCIN: 20 OINTMENT TOPICAL at 08:44

## 2025-07-23 RX ADMIN — PANTOPRAZOLE SODIUM 40 MG: 40 TABLET, DELAYED RELEASE ORAL at 06:13

## 2025-07-23 RX ADMIN — DULOXETINE 30 MG: 30 CAPSULE, DELAYED RELEASE ORAL at 08:43

## 2025-07-23 RX ADMIN — OXYCODONE 5 MG: 5 TABLET ORAL at 10:46

## 2025-07-23 RX ADMIN — DOXYCYCLINE HYCLATE 100 MG: 100 CAPSULE ORAL at 08:46

## 2025-07-23 RX ADMIN — SODIUM CHLORIDE, PRESERVATIVE FREE 10 ML: 5 INJECTION INTRAVENOUS at 08:43

## 2025-07-23 RX ADMIN — IPRATROPIUM BROMIDE AND ALBUTEROL SULFATE 1 DOSE: 2.5; .5 SOLUTION RESPIRATORY (INHALATION) at 13:59

## 2025-07-23 RX ADMIN — IPRATROPIUM BROMIDE AND ALBUTEROL SULFATE 1 DOSE: 2.5; .5 SOLUTION RESPIRATORY (INHALATION) at 06:25

## 2025-07-23 ASSESSMENT — PAIN SCALES - GENERAL
PAINLEVEL_OUTOF10: 4
PAINLEVEL_OUTOF10: 6
PAINLEVEL_OUTOF10: 3

## 2025-07-23 ASSESSMENT — PAIN DESCRIPTION - DESCRIPTORS
DESCRIPTORS: ACHING;DISCOMFORT
DESCRIPTORS: ACHING;SHARP;STABBING;THROBBING

## 2025-07-23 ASSESSMENT — PAIN DESCRIPTION - LOCATION: LOCATION: GENERALIZED;LEG

## 2025-07-23 ASSESSMENT — PAIN - FUNCTIONAL ASSESSMENT: PAIN_FUNCTIONAL_ASSESSMENT: PREVENTS OR INTERFERES SOME ACTIVE ACTIVITIES AND ADLS

## 2025-07-23 NOTE — DISCHARGE INSTRUCTIONS
Your information:  Name: Fausto Del Angel II  : 1975    Your instructions:        What to do after you leave the hospital:    Recommended diet: regular diet    Recommended activity: activity as tolerated        The following personal items were collected during your admission and were returned to you:    Belongings  Dental Appliances: None  Vision - Corrective Lenses: None  Hearing Aid: None  Clothing: At home  Jewelry: None  Electronic Devices: None  Weapons (Notify Protective Services/Security): None  Home Medications: None  Valuables Given To: Other (Comment) (No items with patient)  Provide Name(s) of Who Valuable(s) Were Given To: n/a    Information obtained by:  By signing below, I understand that if any problems occur once I leave the hospital I am to contact .  I understand and acknowledge receipt of the instructions indicated above.

## 2025-07-23 NOTE — PLAN OF CARE
Follow up progress note (admitted after midnight)    Patient seen and examined. Chart reviewed.    Acute hypoxic failure due drug overdose requiring intubation on presentation. UDS positive for cocaine and Fentanyl.    Patient off propofol and is on SBT currently.        Vitamin B12 & Folate [[id number]] (Abnormal) Collected: 07/17/25 0836     Specimen: Blood Updated: 07/17/25 1136      Vitamin B-12 <150 pg/mL       Folate 13.5 ng/mL        Profound B12 deficiency. Will give 1000 mcg IM daily x 2 then weekly x 3 followed by monthly thereafter.  
  Problem: Chronic Conditions and Co-morbidities  Goal: Patient's chronic conditions and co-morbidity symptoms are monitored and maintained or improved  7/19/2025 2301 by Penny Fitzpatrick RN  Outcome: Progressing  Flowsheets (Taken 7/19/2025 2000)  Care Plan - Patient's Chronic Conditions and Co-Morbidity Symptoms are Monitored and Maintained or Improved: Monitor and assess patient's chronic conditions and comorbid symptoms for stability, deterioration, or improvement  Outcome: Progressing     Problem: Discharge Planning  Goal: Discharge to home or other facility with appropriate resources  7/19/2025 2301 by Penny Fitzpatrick RN  Outcome: Progressing  Flowsheets (Taken 7/19/2025 2000)  Discharge to home or other facility with appropriate resources: Identify barriers to discharge with patient and caregiver  Outcome: Progressing     Problem: Pain  Goal: Verbalizes/displays adequate comfort level or baseline comfort level  7/19/2025 2301 by Penny Fitzpatrick RN  Outcome: Progressing       Problem: Safety - Medical Restraint  Goal: Remains free of injury from restraints (Restraint for Interference with Medical Device)  Description: INTERVENTIONS:  1. Determine that other, less restrictive measures have been tried or would not be effective before applying the restraint  2. Evaluate the patient's condition at the time of restraint application  3. Inform patient/family regarding the reason for restraint  4. Q2H: Monitor safety, psychosocial status, comfort, nutrition and hydration  7/19/2025 2301 by Penny Fitzpatrick RN  Outcome: Progressing       Problem: Skin/Tissue Integrity  Goal: Skin integrity remains intact  Description: 1.  Monitor for areas of redness and/or skin breakdown  2.  Assess vascular access sites hourly  3.  Every 4-6 hours minimum:  Change oxygen saturation probe site  4.  Every 4-6 hours:  If on nasal continuous positive airway pressure, respiratory therapy assess nares and determine need for appliance change 
  Problem: Chronic Conditions and Co-morbidities  Goal: Patient's chronic conditions and co-morbidity symptoms are monitored and maintained or improved  7/22/2025 1300 by Rhianna Díaz, RN  Outcome: Progressing  7/21/2025 2326 by Marcela Nieto RN  Outcome: Progressing  Flowsheets (Taken 7/21/2025 2000)  Care Plan - Patient's Chronic Conditions and Co-Morbidity Symptoms are Monitored and Maintained or Improved: Monitor and assess patient's chronic conditions and comorbid symptoms for stability, deterioration, or improvement     
  Problem: Chronic Conditions and Co-morbidities  Goal: Patient's chronic conditions and co-morbidity symptoms are monitored and maintained or improved  7/23/2025 0743 by Eva Young RN  Outcome: Progressing  7/23/2025 0604 by Eva Young RN  Flowsheets (Taken 7/23/2025 0604)  Care Plan - Patient's Chronic Conditions and Co-Morbidity Symptoms are Monitored and Maintained or Improved:   Monitor and assess patient's chronic conditions and comorbid symptoms for stability, deterioration, or improvement   Collaborate with multidisciplinary team to address chronic and comorbid conditions and prevent exacerbation or deterioration   Update acute care plan with appropriate goals if chronic or comorbid symptoms are exacerbated and prevent overall improvement and discharge     Problem: Pain  Goal: Verbalizes/displays adequate comfort level or baseline comfort level  7/23/2025 0743 by Eva Young RN  Outcome: Progressing  7/23/2025 0604 by Eva Young RN  Flowsheets (Taken 7/23/2025 0604)  Verbalizes/displays adequate comfort level or baseline comfort level:   Encourage patient to monitor pain and request assistance   Implement non-pharmacological measures as appropriate and evaluate response   Administer analgesics based on type and severity of pain and evaluate response   Assess pain using appropriate pain scale     Problem: Safety - Medical Restraint  Goal: Remains free of injury from restraints (Restraint for Interference with Medical Device)  Description: INTERVENTIONS:  1. Determine that other, less restrictive measures have been tried or would not be effective before applying the restraint  2. Evaluate the patient's condition at the time of restraint application  3. Inform patient/family regarding the reason for restraint  4. Q2H: Monitor safety, psychosocial status, comfort, nutrition and hydration  7/23/2025 0743 by Eva Young RN  Outcome: Progressing  7/23/2025 
  Problem: Chronic Conditions and Co-morbidities  Goal: Patient's chronic conditions and co-morbidity symptoms are monitored and maintained or improved  Flowsheets (Taken 7/23/2025 0604)  Care Plan - Patient's Chronic Conditions and Co-Morbidity Symptoms are Monitored and Maintained or Improved:   Monitor and assess patient's chronic conditions and comorbid symptoms for stability, deterioration, or improvement   Collaborate with multidisciplinary team to address chronic and comorbid conditions and prevent exacerbation or deterioration   Update acute care plan with appropriate goals if chronic or comorbid symptoms are exacerbated and prevent overall improvement and discharge     Problem: Discharge Planning  Goal: Discharge to home or other facility with appropriate resources  Flowsheets (Taken 7/23/2025 0604)  Discharge to home or other facility with appropriate resources:   Identify barriers to discharge with patient and caregiver   Arrange for needed discharge resources and transportation as appropriate   Identify discharge learning needs (meds, wound care, etc)     Problem: Pain  Goal: Verbalizes/displays adequate comfort level or baseline comfort level  Flowsheets (Taken 7/23/2025 0604)  Verbalizes/displays adequate comfort level or baseline comfort level:   Encourage patient to monitor pain and request assistance   Implement non-pharmacological measures as appropriate and evaluate response   Administer analgesics based on type and severity of pain and evaluate response   Assess pain using appropriate pain scale     Problem: Safety - Medical Restraint  Goal: Remains free of injury from restraints (Restraint for Interference with Medical Device)  Description: INTERVENTIONS:  1. Determine that other, less restrictive measures have been tried or would not be effective before applying the restraint  2. Evaluate the patient's condition at the time of restraint application  3. Inform patient/family regarding the reason 
  Problem: Chronic Conditions and Co-morbidities  Goal: Patient's chronic conditions and co-morbidity symptoms are monitored and maintained or improved  Outcome: Progressing     Problem: Discharge Planning  Goal: Discharge to home or other facility with appropriate resources  Outcome: Progressing     Problem: Pain  Goal: Verbalizes/displays adequate comfort level or baseline comfort level  Outcome: Progressing     Problem: Safety - Medical Restraint  Goal: Remains free of injury from restraints (Restraint for Interference with Medical Device)  Description: INTERVENTIONS:  1. Determine that other, less restrictive measures have been tried or would not be effective before applying the restraint  2. Evaluate the patient's condition at the time of restraint application  3. Inform patient/family regarding the reason for restraint  4. Q2H: Monitor safety, psychosocial status, comfort, nutrition and hydration  Outcome: Progressing     Problem: Skin/Tissue Integrity  Goal: Skin integrity remains intact  Description: 1.  Monitor for areas of redness and/or skin breakdown  2.  Assess vascular access sites hourly  3.  Every 4-6 hours minimum:  Change oxygen saturation probe site  4.  Every 4-6 hours:  If on nasal continuous positive airway pressure, respiratory therapy assess nares and determine need for appliance change or resting period  7/19/2025 1049 by Janelle Watts RN  Outcome: Progressing  7/19/2025 0203 by Yarely Roth RN  Outcome: Progressing     Problem: ABCDS Injury Assessment  Goal: Absence of physical injury  7/19/2025 1049 by Janelle Watts RN  Outcome: Progressing  7/19/2025 0203 by Yarely Roth RN  Outcome: Progressing     Problem: Safety - Adult  Goal: Free from fall injury  7/19/2025 1049 by Janelle Watts RN  Outcome: Progressing  7/19/2025 0203 by Yarely Roth RN  Outcome: Progressing     Problem: Neurosensory - Adult  Goal: Achieves stable or improved neurological 
  Problem: Chronic Conditions and Co-morbidities  Goal: Patient's chronic conditions and co-morbidity symptoms are monitored and maintained or improved  Outcome: Progressing     Problem: Discharge Planning  Goal: Discharge to home or other facility with appropriate resources  Outcome: Progressing  Flowsheets (Taken 7/18/2025 0800)  Discharge to home or other facility with appropriate resources: Identify barriers to discharge with patient and caregiver     Problem: Pain  Goal: Verbalizes/displays adequate comfort level or baseline comfort level  Outcome: Progressing     Problem: Safety - Medical Restraint  Goal: Remains free of injury from restraints (Restraint for Interference with Medical Device)  Description: INTERVENTIONS:  1. Determine that other, less restrictive measures have been tried or would not be effective before applying the restraint  2. Evaluate the patient's condition at the time of restraint application  3. Inform patient/family regarding the reason for restraint  4. Q2H: Monitor safety, psychosocial status, comfort, nutrition and hydration  Outcome: Progressing     Problem: Skin/Tissue Integrity  Goal: Skin integrity remains intact  Description: 1.  Monitor for areas of redness and/or skin breakdown  2.  Assess vascular access sites hourly  3.  Every 4-6 hours minimum:  Change oxygen saturation probe site  4.  Every 4-6 hours:  If on nasal continuous positive airway pressure, respiratory therapy assess nares and determine need for appliance change or resting period  7/18/2025 1345 by Zahra Hanson RN  Outcome: Progressing  Flowsheets (Taken 7/18/2025 0800)  Skin Integrity Remains Intact: Monitor for areas of redness and/or skin breakdown  7/18/2025 0044 by Yarely Roth RN  Outcome: Progressing     Problem: ABCDS Injury Assessment  Goal: Absence of physical injury  7/18/2025 1345 by Zahra Hanson RN  Outcome: Progressing  7/18/2025 0044 by Yarely Roth RN  Outcome: 
  Problem: Chronic Conditions and Co-morbidities  Goal: Patient's chronic conditions and co-morbidity symptoms are monitored and maintained or improved  Outcome: Progressing  Flowsheets (Taken 7/17/2025 1102)  Care Plan - Patient's Chronic Conditions and Co-Morbidity Symptoms are Monitored and Maintained or Improved:   Monitor and assess patient's chronic conditions and comorbid symptoms for stability, deterioration, or improvement   Collaborate with multidisciplinary team to address chronic and comorbid conditions and prevent exacerbation or deterioration   Update acute care plan with appropriate goals if chronic or comorbid symptoms are exacerbated and prevent overall improvement and discharge     Problem: Discharge Planning  Goal: Discharge to home or other facility with appropriate resources  Outcome: Progressing  Flowsheets (Taken 7/17/2025 1102)  Discharge to home or other facility with appropriate resources:   Identify barriers to discharge with patient and caregiver   Arrange for needed discharge resources and transportation as appropriate   Identify discharge learning needs (meds, wound care, etc)   Refer to discharge planning if patient needs post-hospital services based on physician order or complex needs related to functional status, cognitive ability or social support system     Problem: Pain  Goal: Verbalizes/displays adequate comfort level or baseline comfort level  Outcome: Progressing  Flowsheets (Taken 7/17/2025 1102)  Verbalizes/displays adequate comfort level or baseline comfort level:   Encourage patient to monitor pain and request assistance   Assess pain using appropriate pain scale   Administer analgesics based on type and severity of pain and evaluate response     Problem: Safety - Medical Restraint  Goal: Remains free of injury from restraints (Restraint for Interference with Medical Device)  Description: INTERVENTIONS:  1. Determine that other, less restrictive measures have been tried or 
  Problem: Chronic Conditions and Co-morbidities  Goal: Patient's chronic conditions and co-morbidity symptoms are monitored and maintained or improved  Outcome: Progressing  Flowsheets (Taken 7/17/2025 1102)  Care Plan - Patient's Chronic Conditions and Co-Morbidity Symptoms are Monitored and Maintained or Improved:   Monitor and assess patient's chronic conditions and comorbid symptoms for stability, deterioration, or improvement   Collaborate with multidisciplinary team to address chronic and comorbid conditions and prevent exacerbation or deterioration   Update acute care plan with appropriate goals if chronic or comorbid symptoms are exacerbated and prevent overall improvement and discharge     Problem: Discharge Planning  Goal: Discharge to home or other facility with appropriate resources  Outcome: Progressing  Flowsheets (Taken 7/17/2025 1102)  Discharge to home or other facility with appropriate resources:   Identify barriers to discharge with patient and caregiver   Arrange for needed discharge resources and transportation as appropriate   Identify discharge learning needs (meds, wound care, etc)   Refer to discharge planning if patient needs post-hospital services based on physician order or complex needs related to functional status, cognitive ability or social support system     Problem: Pain  Goal: Verbalizes/displays adequate comfort level or baseline comfort level  Outcome: Progressing  Flowsheets (Taken 7/17/2025 1102)  Verbalizes/displays adequate comfort level or baseline comfort level:   Encourage patient to monitor pain and request assistance   Assess pain using appropriate pain scale   Administer analgesics based on type and severity of pain and evaluate response     Problem: Safety - Medical Restraint  Goal: Remains free of injury from restraints (Restraint for Interference with Medical Device)  Description: INTERVENTIONS:  1. Determine that other, less restrictive measures have been tried or 
  Problem: Pain  Goal: Verbalizes/displays adequate comfort level or baseline comfort level  7/20/2025 1941 by Marcela Nieto RN  Outcome: Progressing     Problem: Skin/Tissue Integrity  Goal: Skin integrity remains intact  Description: 1.  Monitor for areas of redness and/or skin breakdown  2.  Assess vascular access sites hourly  3.  Every 4-6 hours minimum:  Change oxygen saturation probe site  4.  Every 4-6 hours:  If on nasal continuous positive airway pressure, respiratory therapy assess nares and determine need for appliance change or resting period  7/20/2025 1941 by Marcela Nieto RN  Outcome: Progressing  7/20/2025 0959 by Zahra Hanson RN  Outcome: Progressing  Flowsheets (Taken 7/20/2025 0800)  Skin Integrity Remains Intact: Monitor for areas of redness and/or skin breakdown     Problem: Neurosensory - Adult  Goal: Achieves stable or improved neurological status  Outcome: Progressing     Problem: Cardiovascular - Adult  Goal: Maintains optimal cardiac output and hemodynamic stability  Outcome: Progressing     Problem: Musculoskeletal - Adult  Goal: Maintain proper alignment of affected body part  7/20/2025 1941 by Marcela Nieto RN  Outcome: Progressing     Problem: Genitourinary - Adult  Goal: Absence of urinary retention  7/20/2025 1941 by Marcela Nieto RN  Outcome: Progressing     Problem: Infection - Adult  Goal: Absence of infection at discharge  7/20/2025 1941 by Marcela Nieto RN  Outcome: Progressing     Problem: Coping  Goal: Pt/Family able to verbalize concerns and demonstrate effective coping strategies  Description: INTERVENTIONS:  1. Assist patient/family to identify coping skills, available support systems and cultural and spiritual values  2. Provide emotional support, including active listening and acknowledgement of concerns of patient and caregivers  3. Reduce environmental stimuli, as able  4. Instruct patient/family in relaxation techniques, as appropriate  5. Assess for spiritual 
  Problem: Pain  Goal: Verbalizes/displays adequate comfort level or baseline comfort level  Outcome: Progressing  Flowsheets (Taken 7/21/2025 2000)  Verbalizes/displays adequate comfort level or baseline comfort level:   Assess pain using appropriate pain scale   Encourage patient to monitor pain and request assistance     Problem: Skin/Tissue Integrity  Goal: Skin integrity remains intact  Description: 1.  Monitor for areas of redness and/or skin breakdown  2.  Assess vascular access sites hourly  3.  Every 4-6 hours minimum:  Change oxygen saturation probe site  4.  Every 4-6 hours:  If on nasal continuous positive airway pressure, respiratory therapy assess nares and determine need for appliance change or resting period  Outcome: Progressing  Flowsheets (Taken 7/21/2025 2000)  Skin Integrity Remains Intact: Monitor for areas of redness and/or skin breakdown     Problem: Neurosensory - Adult  Goal: Achieves stable or improved neurological status  Outcome: Progressing  Flowsheets (Taken 7/21/2025 2000)  Achieves stable or improved neurological status: Assess for and report changes in neurological status     Problem: Respiratory - Adult  Goal: Achieves optimal ventilation and oxygenation  Outcome: Progressing  Flowsheets (Taken 7/21/2025 2000)  Achieves optimal ventilation and oxygenation: Assess for changes in respiratory status     Problem: Musculoskeletal - Adult  Goal: Return ADL status to a safe level of function  Outcome: Progressing  Flowsheets (Taken 7/21/2025 2000)  Return ADL Status to a Safe Level of Function: Administer medication as ordered     Problem: Metabolic/Fluid and Electrolytes - Adult  Goal: Glucose maintained within prescribed range  Outcome: Progressing  Flowsheets (Taken 7/21/2025 2000)  Glucose maintained within prescribed range: Monitor blood glucose as ordered     
  Problem: Skin/Tissue Integrity  Goal: Skin integrity remains intact  Description: 1.  Monitor for areas of redness and/or skin breakdown  2.  Assess vascular access sites hourly  3.  Every 4-6 hours minimum:  Change oxygen saturation probe site  4.  Every 4-6 hours:  If on nasal continuous positive airway pressure, respiratory therapy assess nares and determine need for appliance change or resting period  7/18/2025 0044 by Yarely Roth RN  Outcome: Progressing     Problem: ABCDS Injury Assessment  Goal: Absence of physical injury  7/18/2025 0044 by Yarely Roth RN  Outcome: Progressing     Problem: Safety - Adult  Goal: Free from fall injury  7/18/2025 0044 by Yarely Roth RN  Outcome: Progressing     Problem: Neurosensory - Adult  Goal: Achieves stable or improved neurological status  7/18/2025 0044 by Yarely Roth RN  Outcome: Progressing     Problem: Cardiovascular - Adult  Goal: Maintains optimal cardiac output and hemodynamic stability  7/18/2025 0044 by Yarely Roth RN  Outcome: Progressing     Problem: Skin/Tissue Integrity - Adult  Goal: Skin integrity remains intact  Description: 1.  Monitor for areas of redness and/or skin breakdown  2.  Assess vascular access sites hourly  3.  Every 4-6 hours minimum:  Change oxygen saturation probe site  4.  Every 4-6 hours:  If on nasal continuous positive airway pressure, respiratory therapy assess nares and determine need for appliance change or resting period  7/18/2025 0044 by Yarely Rtoh RN  Outcome: Progressing     
  Problem: Skin/Tissue Integrity  Goal: Skin integrity remains intact  Description: 1.  Monitor for areas of redness and/or skin breakdown  2.  Assess vascular access sites hourly  3.  Every 4-6 hours minimum:  Change oxygen saturation probe site  4.  Every 4-6 hours:  If on nasal continuous positive airway pressure, respiratory therapy assess nares and determine need for appliance change or resting period  7/19/2025 0203 by Yarely Roth RN  Outcome: Progressing     Problem: ABCDS Injury Assessment  Goal: Absence of physical injury  7/19/2025 0203 by Yarely Roth RN  Outcome: Progressing     Problem: ABCDS Injury Assessment  Goal: Absence of physical injury  7/19/2025 0203 by Yarely Roth RN  Outcome: Progressing     Problem: Safety - Adult  Goal: Free from fall injury  7/19/2025 0203 by Yarely Roth RN  Outcome: Progressing     Problem: ABCDS Injury Assessment  Goal: Absence of physical injury  7/19/2025 0203 by Yarely Roth RN  Outcome: Progressing     Problem: Neurosensory - Adult  Goal: Achieves stable or improved neurological status  7/19/2025 0203 by Yarely Roth RN  Outcome: Progressing     Problem: Safety - Adult  Goal: Free from fall injury  7/19/2025 0203 by Yarely Roth RN  Outcome: Progressing     Problem: Skin/Tissue Integrity - Adult  Goal: Skin integrity remains intact  Description: 1.  Monitor for areas of redness and/or skin breakdown  2.  Assess vascular access sites hourly  3.  Every 4-6 hours minimum:  Change oxygen saturation probe site  4.  Every 4-6 hours:  If on nasal continuous positive airway pressure, respiratory therapy assess nares and determine need for appliance change or resting period  7/19/2025 0203 by Yarely Roth RN  Outcome: Progressing     Problem: Genitourinary - Adult  Goal: Absence of urinary retention  Outcome: Progressing     
consults as needed  7/20/2025 0959 by Zahra Hanson, RN  Outcome: Progressing  Flowsheets (Taken 7/20/2025 0800)  Patient/family able to verbalize anxieties, fears, and concerns, and demonstrate effective coping: Reduce environmental stimuli, as able  7/19/2025 2301 by Penny Fitzpatrick, RN  Outcome: Progressing  Flowsheets (Taken 7/19/2025 2000)  Patient/family able to verbalize anxieties, fears, and concerns, and demonstrate effective coping: Assist patient/family to identify coping skills, available support systems and cultural and spiritual values

## 2025-07-23 NOTE — DISCHARGE SUMMARY
[facility] Hospitalist       Hospitalist Physician Discharge Summary         Activity level: Unable to stand at this point    Diet: ADULT DIET; Regular      Condition at discharge: Stable    Dispo: [facility] [place]      Patient ID:  Fausto Del Angel II  [id number]  49 y.o.  1975    Admit date: 7/17/2025    Discharge date and time:  7/23/2025  6:07 PM    Admission Diagnoses: Principal Problem:    Unresponsive state  Active Problems:    Lactic acidosis    Elevated LFTs    Polysubstance abuse (HCC)    Type 2 diabetes mellitus with hyperglycemia, with long-term current use of insulin (HCC)    Hypokalemia    Accidental drug overdose    B12 deficiency    Acute hypoxic respiratory failure (HCC)  Resolved Problems:    * No resolved hospital problems. *      Discharge Diagnoses: Principal Problem:    Unresponsive state  Active Problems:    Lactic acidosis    Elevated LFTs    Polysubstance abuse (HCC)    Type 2 diabetes mellitus with hyperglycemia, with long-term current use of insulin (HCC)    Hypokalemia    Accidental drug overdose    B12 deficiency    Acute hypoxic respiratory failure (HCC)  Resolved Problems:    * No resolved hospital problems. *      Consults:  IP CONSULT TO CRITICAL CARE  PHARMACY TO DOSE VANCOMYCIN  IP CONSULT TO INFECTIOUS DISEASES  IP CONSULT TO PULMONOLOGY    Procedures: Intubation    Hospital Course: This is a 49 years old white gentleman significant past medical history of hypertension, diabetes, pulmonary embolism in the past on Eliquis, drug abuse and cocaine use, lymphedema who was in a nursing facility and was found unresponsive after his girlfriend went to visit him.  He also was found to have a lot of to get tobacco in his mouth that was aspirated.  He was given Narcan by EMS and he was intubated on the way for airway protection.  Urine drug screen was positive for cocaine, fentanyl, oxycodone.  Patient was admitted he was started on IV fluids and he came in to the emergency room

## 2025-07-23 NOTE — CARE COORDINATION
7/23/2025 1425 CM Note:  Pt is being discharged back to [facility] [place], PT/OT notes have been updated and PRECERT was submitted by [facility] liaison but pt may return with it pending under his Medicaid.  [facility] is scheduled to transport pt via stretcher at 1600, ambulance form completed and placed with the pt's chart. [facility] aware of  time, and pt's son Tim.  Electronically signed by Joy Coto RN on 7/23/2025 at 2:36 PM

## 2025-07-23 NOTE — PROGRESS NOTES
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    Strength BUE:  refer to OT eval  RLE:    Hip 1/5  Knee 2/5  Foot / ankle 0/5  LLE:    Hip 1/5  Knee 2/5  Foot / ankle 0/5  Increase strength in affected mm groups by 1/3 grade   Balance Sitting EOB:  not assessed   Dynamic Standing:  not assessed  Sitting EOB: poor   Dynamic Standing: not assessed    Sitting EOB:   N/A  Dynamic Standing: N/A     Patient is Alert & Oriented x person, place, time, and situation and follows directions    Sensation:  Patient  reports neuropathy and numbness/tingling bilateral feet     Edema:  no   Endurance: poor     Vitals: room air   Blood Pressure at rest  Blood Pressure during session    Heart Rate at rest  Heart Rate during session    SPO2 at rest %  SPO2 during session %     Patient education  Patient educated on 
patient. Max assist   Using rails and head of bed elevated:     Rolling: Moderate assist of 1   Supine to sit: Maximal assist of 1   Sit to supine: Maximal assist of 1   Scooting: Moderate assist of 1    Rolling: Minimal assist of 1    Supine to sit: Minimal assist of 1    Sit to supine: Minimal assist of 1    Scooting: Minimal assist of 1     Transfers Sit to stand: N/A   Sit to stand:  N/A   Ambulation    not assessed    N/A    Stair negotiation: ascended and descended   Not assessed     N/A    ROM UE:  R:   Shoulder: Reports shoulder pain and limited mobility.  Limited movement to ~90 deg for therapist.  Later observed moving freely.  Elbow: Reports restrictions on elbow extension due to recent ulnar nerve transposition. Observed moving freely later.  Wrist / hand: Reports limitations and pain, but moves freely.  L:   Moves freely.  ROM WFL.    LE:   R:  Only actively moves knee.  Little passive movement hip.  No A/P movement of foot / ankle.  L:   No active movement of hip or foot.  Moves knee freely.  Little passive movement L hip. No passive movement L foot / ankle.     Increase range of motion 10% of affected joints    Strength BUE:  refer to OT eval  RLE:    Hip 1/5  Knee 2/5  Foot / ankle 0/5  LLE:    Hip 1/5  Knee 2/5  Foot / ankle 0/5  Increase strength in affected mm groups by 1/3 grade   Balance Sitting EOB:  not assessed   Dynamic Standing:  not assessed  Sitting EOB: poor   Dynamic Standing: not assessed    Sitting EOB:   N/A  Dynamic Standing: N/A     Patient is Alert & Oriented x person, place, time, and situation and follows directions    Sensation:  Patient  reports neuropathy and numbness/tingling bilateral feet     Edema:  no   Endurance: poor     Vitals:     Blood Pressure at rest  Blood Pressure during session    Heart Rate at rest 130 Heart Rate during session 162   SPO2 at rest %  SPO2 during session %     Patient education  Patient educated on role of Physical Therapy, risks of immobility,

## 2025-07-28 ENCOUNTER — OUTSIDE SERVICES (OUTPATIENT)
Dept: PRIMARY CARE CLINIC | Age: 50
End: 2025-07-28
Payer: MEDICARE

## 2025-07-28 DIAGNOSIS — T84.020D DISLOCATION OF INTERNAL RIGHT HIP PROSTHESIS, SUBSEQUENT ENCOUNTER: ICD-10-CM

## 2025-07-28 DIAGNOSIS — I82.402 RECURRENT ACUTE DEEP VEIN THROMBOSIS (DVT) OF LEFT LOWER EXTREMITY (HCC): ICD-10-CM

## 2025-07-28 DIAGNOSIS — Z86.711 PERSONAL HISTORY OF PULMONARY EMBOLISM: ICD-10-CM

## 2025-07-28 DIAGNOSIS — R60.0 BILATERAL LEG EDEMA: ICD-10-CM

## 2025-07-28 DIAGNOSIS — M79.2 NEUROPATHIC PAIN: ICD-10-CM

## 2025-07-28 DIAGNOSIS — I10 ESSENTIAL (PRIMARY) HYPERTENSION: ICD-10-CM

## 2025-07-28 DIAGNOSIS — J96.01 ACUTE RESPIRATORY FAILURE WITH HYPOXIA (HCC): ICD-10-CM

## 2025-07-28 DIAGNOSIS — S32.810D MULTIPLE FRACTURES OF PELVIS WITH STABLE DISRUPTION OF PELVIC RING, SUBSEQUENT ENCOUNTER FOR FRACTURE WITH ROUTINE HEALING: ICD-10-CM

## 2025-07-28 DIAGNOSIS — E11.65 TYPE 2 DIABETES MELLITUS WITH HYPERGLYCEMIA, WITHOUT LONG-TERM CURRENT USE OF INSULIN (HCC): ICD-10-CM

## 2025-07-28 DIAGNOSIS — J15.211 PNEUMONIA DUE TO METHICILLIN SUSCEPTIBLE STAPHYLOCOCCUS AUREUS (MSSA), UNSPECIFIED LATERALITY, UNSPECIFIED PART OF LUNG (HCC): Primary | ICD-10-CM

## 2025-07-28 PROCEDURE — 99306 1ST NF CARE HIGH MDM 50: CPT | Performed by: INTERNAL MEDICINE

## 2025-07-31 ASSESSMENT — ENCOUNTER SYMPTOMS
BLOOD IN STOOL: 0
SINUS PRESSURE: 0
NAUSEA: 0
SINUS PAIN: 0
VOMITING: 0
COLOR CHANGE: 0
CONSTIPATION: 0
ORTHOPNEA: 1
FACIAL SWELLING: 0
BACK PAIN: 0
SHORTNESS OF BREATH: 1
TROUBLE SWALLOWING: 0
WHEEZING: 1
SORE THROAT: 0
ABDOMINAL PAIN: 0
COUGH: 0
EYE DISCHARGE: 0
DIARRHEA: 0
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
## 2025-08-05 ENCOUNTER — OUTSIDE SERVICES (OUTPATIENT)
Dept: PRIMARY CARE CLINIC | Age: 50
End: 2025-08-05
Payer: MEDICARE

## 2025-08-05 DIAGNOSIS — M79.2 NEUROPATHIC PAIN: ICD-10-CM

## 2025-08-05 DIAGNOSIS — R60.0 BILATERAL LEG EDEMA: ICD-10-CM

## 2025-08-05 DIAGNOSIS — I82.402 RECURRENT ACUTE DEEP VEIN THROMBOSIS (DVT) OF LEFT LOWER EXTREMITY (HCC): ICD-10-CM

## 2025-08-05 DIAGNOSIS — I10 ESSENTIAL (PRIMARY) HYPERTENSION: ICD-10-CM

## 2025-08-05 DIAGNOSIS — Z86.711 PERSONAL HISTORY OF PULMONARY EMBOLISM: ICD-10-CM

## 2025-08-05 DIAGNOSIS — J15.211 PNEUMONIA DUE TO METHICILLIN SUSCEPTIBLE STAPHYLOCOCCUS AUREUS (MSSA), UNSPECIFIED LATERALITY, UNSPECIFIED PART OF LUNG (HCC): Primary | ICD-10-CM

## 2025-08-05 DIAGNOSIS — E11.65 TYPE 2 DIABETES MELLITUS WITH HYPERGLYCEMIA, WITHOUT LONG-TERM CURRENT USE OF INSULIN (HCC): ICD-10-CM

## 2025-08-05 DIAGNOSIS — S32.810D MULTIPLE FRACTURES OF PELVIS WITH STABLE DISRUPTION OF PELVIC RING, SUBSEQUENT ENCOUNTER FOR FRACTURE WITH ROUTINE HEALING: ICD-10-CM

## 2025-08-05 DIAGNOSIS — T84.020D DISLOCATION OF INTERNAL RIGHT HIP PROSTHESIS, SUBSEQUENT ENCOUNTER: ICD-10-CM

## 2025-08-05 DIAGNOSIS — J96.01 ACUTE RESPIRATORY FAILURE WITH HYPOXIA (HCC): ICD-10-CM

## 2025-08-05 PROCEDURE — 99309 SBSQ NF CARE MODERATE MDM 30: CPT | Performed by: INTERNAL MEDICINE

## 2025-08-06 ASSESSMENT — ENCOUNTER SYMPTOMS
ABDOMINAL PAIN: 0
COUGH: 0
NAUSEA: 0
SINUS PRESSURE: 0
WHEEZING: 1
VOMITING: 0
SINUS PAIN: 0
EYE DISCHARGE: 0
SORE THROAT: 0
FACIAL SWELLING: 0
BLOOD IN STOOL: 0
COLOR CHANGE: 0
CONSTIPATION: 0
ORTHOPNEA: 1
BACK PAIN: 0
SHORTNESS OF BREATH: 1
DIARRHEA: 0
TROUBLE SWALLOWING: 0

## 2025-08-11 ENCOUNTER — OUTSIDE SERVICES (OUTPATIENT)
Dept: PRIMARY CARE CLINIC | Age: 50
End: 2025-08-11
Payer: MEDICARE

## 2025-08-11 DIAGNOSIS — I82.402 RECURRENT ACUTE DEEP VEIN THROMBOSIS (DVT) OF LEFT LOWER EXTREMITY (HCC): ICD-10-CM

## 2025-08-11 DIAGNOSIS — J96.01 ACUTE RESPIRATORY FAILURE WITH HYPOXIA (HCC): ICD-10-CM

## 2025-08-11 DIAGNOSIS — I10 ESSENTIAL (PRIMARY) HYPERTENSION: ICD-10-CM

## 2025-08-11 DIAGNOSIS — J15.211 PNEUMONIA DUE TO METHICILLIN SUSCEPTIBLE STAPHYLOCOCCUS AUREUS (MSSA), UNSPECIFIED LATERALITY, UNSPECIFIED PART OF LUNG (HCC): Primary | ICD-10-CM

## 2025-08-11 DIAGNOSIS — R60.0 BILATERAL LEG EDEMA: ICD-10-CM

## 2025-08-11 DIAGNOSIS — Z86.711 PERSONAL HISTORY OF PULMONARY EMBOLISM: ICD-10-CM

## 2025-08-11 DIAGNOSIS — E11.65 TYPE 2 DIABETES MELLITUS WITH HYPERGLYCEMIA, WITHOUT LONG-TERM CURRENT USE OF INSULIN (HCC): ICD-10-CM

## 2025-08-11 DIAGNOSIS — S32.810D MULTIPLE FRACTURES OF PELVIS WITH STABLE DISRUPTION OF PELVIC RING, SUBSEQUENT ENCOUNTER FOR FRACTURE WITH ROUTINE HEALING: ICD-10-CM

## 2025-08-11 DIAGNOSIS — M79.2 NEUROPATHIC PAIN: ICD-10-CM

## 2025-08-11 DIAGNOSIS — T84.020D DISLOCATION OF INTERNAL RIGHT HIP PROSTHESIS, SUBSEQUENT ENCOUNTER: ICD-10-CM

## 2025-08-11 PROCEDURE — 99309 SBSQ NF CARE MODERATE MDM 30: CPT | Performed by: INTERNAL MEDICINE

## 2025-08-22 ENCOUNTER — OUTSIDE SERVICES (OUTPATIENT)
Dept: PRIMARY CARE CLINIC | Age: 50
End: 2025-08-22
Payer: MEDICARE

## 2025-08-22 DIAGNOSIS — S32.810D MULTIPLE FRACTURES OF PELVIS WITH STABLE DISRUPTION OF PELVIC RING, SUBSEQUENT ENCOUNTER FOR FRACTURE WITH ROUTINE HEALING: ICD-10-CM

## 2025-08-22 DIAGNOSIS — T84.020D DISLOCATION OF INTERNAL RIGHT HIP PROSTHESIS, SUBSEQUENT ENCOUNTER: ICD-10-CM

## 2025-08-22 DIAGNOSIS — Z86.711 PERSONAL HISTORY OF PULMONARY EMBOLISM: ICD-10-CM

## 2025-08-22 DIAGNOSIS — I82.402 RECURRENT ACUTE DEEP VEIN THROMBOSIS (DVT) OF LEFT LOWER EXTREMITY (HCC): ICD-10-CM

## 2025-08-22 DIAGNOSIS — J96.01 ACUTE RESPIRATORY FAILURE WITH HYPOXIA (HCC): ICD-10-CM

## 2025-08-22 DIAGNOSIS — E11.65 TYPE 2 DIABETES MELLITUS WITH HYPERGLYCEMIA, WITHOUT LONG-TERM CURRENT USE OF INSULIN (HCC): ICD-10-CM

## 2025-08-22 DIAGNOSIS — I10 ESSENTIAL (PRIMARY) HYPERTENSION: ICD-10-CM

## 2025-08-22 DIAGNOSIS — R60.0 BILATERAL LEG EDEMA: ICD-10-CM

## 2025-08-22 DIAGNOSIS — J15.211 PNEUMONIA DUE TO METHICILLIN SUSCEPTIBLE STAPHYLOCOCCUS AUREUS (MSSA), UNSPECIFIED LATERALITY, UNSPECIFIED PART OF LUNG (HCC): Primary | ICD-10-CM

## 2025-08-22 DIAGNOSIS — M79.2 NEUROPATHIC PAIN: ICD-10-CM

## 2025-08-22 PROCEDURE — 99309 SBSQ NF CARE MODERATE MDM 30: CPT | Performed by: INTERNAL MEDICINE

## 2025-08-25 ASSESSMENT — ENCOUNTER SYMPTOMS
SINUS PAIN: 0
BACK PAIN: 0
DIARRHEA: 0
BLOOD IN STOOL: 0
ABDOMINAL PAIN: 0
COLOR CHANGE: 0
NAUSEA: 0
COUGH: 0
ABDOMINAL PAIN: 0
SORE THROAT: 0
FACIAL SWELLING: 0
EYE DISCHARGE: 0
COUGH: 0
CONSTIPATION: 0
VOMITING: 0
DIARRHEA: 0
VOMITING: 0
EYE DISCHARGE: 0
BLOOD IN STOOL: 0
ORTHOPNEA: 1
TROUBLE SWALLOWING: 0
WHEEZING: 1
SHORTNESS OF BREATH: 1
BACK PAIN: 0
NAUSEA: 0
TROUBLE SWALLOWING: 0
SINUS PRESSURE: 0
SINUS PRESSURE: 0
SHORTNESS OF BREATH: 1
COLOR CHANGE: 0
WHEEZING: 1
FACIAL SWELLING: 0
SINUS PAIN: 0
SORE THROAT: 0
ORTHOPNEA: 1
CONSTIPATION: 0

## (undated) DEVICE — BLADE,STAINLESS-STEEL,15,STRL,DISPOSABLE: Brand: MEDLINE

## (undated) DEVICE — ZIMMER® STERILE DISPOSABLE TOURNIQUET CUFF WITH PLC, DUAL PORT, SINGLE BLADDER, 18 IN. (46 CM)

## (undated) DEVICE — SURGICAL PROCEDURE PACK BASIC

## (undated) DEVICE — PAD,ABDOMINAL,5"X9",ST,LF,25/BX: Brand: MEDLINE INDUSTRIES, INC.

## (undated) DEVICE — GLOVE ORANGE PI 7 1/2   MSG9075

## (undated) DEVICE — GLOVE ORANGE PI 7   MSG9070

## (undated) DEVICE — TROCAR: Brand: KII FIOS FIRST ENTRY

## (undated) DEVICE — DRAPE,REIN 53X77,STERILE: Brand: MEDLINE

## (undated) DEVICE — SYRINGE MED 10ML LUERLOCK TIP W/O SFTY DISP

## (undated) DEVICE — STAPLER INT L60MM REG TISS BLU B FRM 8 FIRING 2 ROW AUTO

## (undated) DEVICE — HANDPIECE SET WITH BONE CLEANING TIP AND SUCTION TUBE: Brand: INTERPULSE

## (undated) DEVICE — WIPES SKIN CLOTH READYPREP 9 X 10.5 IN 2% CHLORHEX GLUCONATE CHG PREOP

## (undated) DEVICE — SKIN PREP TRAY 4 COMPARTM TRAY: Brand: MEDLINE INDUSTRIES, INC.

## (undated) DEVICE — BANDAGE,GAUZE,BULKEE II,4.5"X4.1YD,STRL: Brand: MEDLINE

## (undated) DEVICE — COVER,TABLE,60X90,STERILE: Brand: MEDLINE

## (undated) DEVICE — CATHETER INFUSION ENDOVASC DEV 40 FRX135 CM US COR EKOSONIC

## (undated) DEVICE — PACK,UNIV, II AURORA: Brand: MEDLINE

## (undated) DEVICE — SOLUTION IV IRRIG WATER 1000ML POUR BRL 2F7114

## (undated) DEVICE — ELECTRODE PT RET AD L9FT HI MOIST COND ADH HYDRGEL CORDED

## (undated) DEVICE — SYRINGE 20ML LL S/C 50

## (undated) DEVICE — DRAPE THER FLUID WARMING 66X44 IN FLAT SLUSH DBL DISC ORS

## (undated) DEVICE — BEACON TIP TORCON NB ADVANTAGE CATHETER: Brand: BEACON TIP TORCON NB

## (undated) DEVICE — CATHETER ETER IV 20GA L1IN POLYUR STR RADPQ INTROCAN SFTY

## (undated) DEVICE — STANDARD (U) BLADE ASSEMBLY 1PK: Brand: MICROAIRE®

## (undated) DEVICE — AGENT HEMSTAT W4XL4IN OXIDIZED REGENERATED CELOS STRUCTURED

## (undated) DEVICE — SET INSTRUMENT LAP II

## (undated) DEVICE — DRESSING HYDROFIBER AQUACEL AG ADVANTAGE 3.5X10 IN

## (undated) DEVICE — KIT,ANTI FOG,W/SPONGE & FLUID,SOFT PACK: Brand: MEDLINE

## (undated) DEVICE — GLOVE SURG L12IN SZ 65FNGR THK94MIL TRNSLUC YEL LTX

## (undated) DEVICE — 3M™ STERI-DRAPE™ ISOLATION BAG, 10 PER CARTON / 4 CARTONS PER CASE, 1003: Brand: 3M™ STERI-DRAPE™

## (undated) DEVICE — PATIENT RETURN ELECTRODE, SINGLE-USE, CONTACT QUALITY MONITORING, ADULT, WITH 9FT CORD, FOR PATIENTS WEIGING OVER 33LBS. (15KG): Brand: MEGADYNE

## (undated) DEVICE — SOLUTION IRRIG 500ML 0.9% SOD CHLO USP POUR PLAS BTL

## (undated) DEVICE — BLADE CLIPPER GEN PURP NS

## (undated) DEVICE — DRILL BIT AO FITTING

## (undated) DEVICE — SPONGE GZ W4XL4IN RAYON POLY FILL CVR W/ NONWOVEN FAB

## (undated) DEVICE — GAUZE,SPONGE,AVANT,4"X4",4PLY,STRL,10/TR: Brand: MEDLINE

## (undated) DEVICE — SYRINGE MED 10ML TRNSLUC BRL PLUNG BLK MRK POLYPR CTRL

## (undated) DEVICE — PACK,LAPAROTOMY,NO GOWNS: Brand: MEDLINE

## (undated) DEVICE — SPLINT KNEE UNIV FOR LESS THAN 36IN L24IN FOAM LAM ELAS CNTCT

## (undated) DEVICE — DRIP REDUCTION MANIFOLD

## (undated) DEVICE — 3M™ STERI-DRAPE™ U-DRAPE 1015: Brand: STERI-DRAPE™

## (undated) DEVICE — TOWEL,OR,DSP,ST,BLUE,STD,6/PK,12PK/CS: Brand: MEDLINE

## (undated) DEVICE — DRAPE EQUIP CARM 72X42 IN RUBBER BND CLP

## (undated) DEVICE — TROCAR: Brand: KII® SLEEVE

## (undated) DEVICE — 18GA (1.3MM OD: 1.0MM ID) X 7CM INTRODUCER18GA X 7CM NEEDLENEEDLE: Brand: INTRODUCER NEEDLEINTRODUCER NEEDLE

## (undated) DEVICE — PAD PREP L 2 PLY 70% ISO ALC NONWOVEN SFT ABSRB TOP ANTISEP

## (undated) DEVICE — CONVERTORS STOCKINETTE: Brand: CONVERTORS

## (undated) DEVICE — STANDARD (U) BLADE ASSEMBLY 6PK: Brand: MICROAIRE®

## (undated) DEVICE — DILATOR ART

## (undated) DEVICE — Device

## (undated) DEVICE — WARMER SCP LAP

## (undated) DEVICE — BITEBLOCK 54FR W/ DENT RIM BLOX

## (undated) DEVICE — UNIVERSAL DRAPE: Brand: MEDLINE INDUSTRIES, INC.

## (undated) DEVICE — 3M™ IOBAN™ 2 ANTIMICROBIAL INCISE DRAPE 6650EZ: Brand: IOBAN™ 2

## (undated) DEVICE — RETRACTOR BOOKWALTER BLADE

## (undated) DEVICE — TRAY,SKIN SCRUB,DRY,W/GAUZE: Brand: MEDLINE

## (undated) DEVICE — INTENDED FOR TISSUE SEPARATION, AND OTHER PROCEDURES THAT REQUIRE A SHARP SURGICAL BLADE TO PUNCTURE OR CUT.: Brand: BARD-PARKER ® STAINLESS STEEL BLADES

## (undated) DEVICE — SET ENDO INSTR LAPAROSCOPIC STGENLAP

## (undated) DEVICE — SURGICAL PROCEDURE PACK VASC MAJ CUST

## (undated) DEVICE — NEPTUNE E-SEP SMOKE EVACUATION PENCIL, COATED, 70MM BLADE, PUSH BUTTON SWITCH: Brand: NEPTUNE E-SEP

## (undated) DEVICE — MAGNETIC INSTR DRAPE 20X16: Brand: MEDLINE INDUSTRIES, INC.

## (undated) DEVICE — Z DUP USE 2257490 ADHESIVE SKIN CLSRE 036ML TPCL 2CTL CNCRLTE HIGH VSCSTY DRMB

## (undated) DEVICE — REDUCTION PIN: Brand: MATTA

## (undated) DEVICE — DRESSING TRNSPAR W4XL55IN ACRYL SUP FLM W ADH WTRPRF OPSITE

## (undated) DEVICE — SINGLE USE SUCTION VALVE MAJ-209: Brand: SINGLE USE SUCTION VALVE (STERILE)

## (undated) DEVICE — SET INSTRUMENT LAP I

## (undated) DEVICE — DRESSING HYDROFIBER AQUACEL AG ADVANTAGE 3.5X6 IN

## (undated) DEVICE — DEFENDO AIR WATER SUCTION AND BIOPSY VALVE KIT FOR  OLYMPUS: Brand: DEFENDO AIR/WATER/SUCTION AND BIOPSY VALVE

## (undated) DEVICE — GOWN,SIRUS,FABRNF,XL,20/CS: Brand: MEDLINE

## (undated) DEVICE — APPLICATOR MEDICATED 26 CC SOLUTION HI LT ORNG CHLORAPREP

## (undated) DEVICE — STAPLER INT L75MM CUT LN L73MM STPL LN L77MM BLU B FRM 8

## (undated) DEVICE — SWABSTCK, BENZOIN TINCTURE, 1/PK, STRL: Brand: APLICARE

## (undated) DEVICE — NEEDLE CLOSURE OMNICLOSE

## (undated) DEVICE — INSUFFLATION NEEDLE TO ESTABLISH PNEUMOPERITONEUM.: Brand: INSUFFLATION NEEDLE

## (undated) DEVICE — PENCIL,CAUTERY,ROCKER,PTFE,15'CORD: Brand: MEDLINE INDUSTRIES, INC.

## (undated) DEVICE — PADDING CAST W6INXL4YD COT LO LINTING WYTEX

## (undated) DEVICE — DRAPE C ARM W41XL74IN UNIV MOB W RUBBERBAND CLP

## (undated) DEVICE — SYRINGE MED 10ML POLYPR LUERSLIP TIP FLAT TOP W/O SFTY DISP

## (undated) DEVICE — YANKAUER,OPEN TIP,W/O VENT,STERILE: Brand: MEDLINE INDUSTRIES, INC.

## (undated) DEVICE — GLOVE SURG SZ 8 L12IN FNGR THK79MIL GRN LTX FREE

## (undated) DEVICE — DOUBLE BASIN SET: Brand: MEDLINE INDUSTRIES, INC.

## (undated) DEVICE — NEEDLE HYPO 25GA L1.5IN BLU POLYPR HUB S STL REG BVL STR

## (undated) DEVICE — DRESSING HYDROFIBER AQUACEL AG ADVANTAGE 3.5X12 IN

## (undated) DEVICE — TOTAL TRAY, DB, 100% SILI FOLEY, 16FR 10: Brand: MEDLINE

## (undated) DEVICE — LABEL MED 4 IN SURG PANEL W/ PEN STRL

## (undated) DEVICE — CONTAINER SPEC 60ML PH 7NEUTRAL BUFF FRMLN RDY TO USE

## (undated) DEVICE — BENTSON WIRE GUIDE 20CM DISTAL FLEXIBILITY WITH SOFTENED TIP: Brand: BENTSON

## (undated) DEVICE — CHLORAPREP 26ML ORANGE

## (undated) DEVICE — 3M™ COBAN™ NL STERILE NON-LATEX SELF-ADHERENT WRAP, 2084S, 4 IN X 5 YD (10 CM X 4,5 M), 18 ROLLS/CASE: Brand: 3M™ COBAN™

## (undated) DEVICE — LOOP VES W25MM THK1MM MAXI RED SIL FLD REPELLENT 100 PER

## (undated) DEVICE — CAMERA STRYKER 1488 HD GEN

## (undated) DEVICE — BIT DRL QC 2.5X170 MM 80 MM CALIB STRL

## (undated) DEVICE — SEALER TISS L20CM DIA13MM ADV BPLR L CRV JAW OPN APPRCH

## (undated) DEVICE — INCISE SURGICAL DRAPE: Brand: OPSITE INCISE 28X30CM CTN 10

## (undated) DEVICE — STANDARD HYPODERMIC NEEDLE,ALUMINUM HUB: Brand: MONOJECT

## (undated) DEVICE — GLOVE ORTHO 8   MSG9480

## (undated) DEVICE — Z DISCONTINUED APPLICATOR SURG PREP 0.35OZ 2% CHG 70% ISO ALC W/ HI LT

## (undated) DEVICE — ENDOVIVE SFT PEG KIT PULL WENFIT 20F BX2

## (undated) DEVICE — TUBING SUCT 12FR MAL ALUM SHFT FN CAP VENT UNIV CONN W/ OBT

## (undated) DEVICE — BIT DRL L112MM DIA3.5MM ST QUIK CPL NONRADIOPAQUE W/O STP

## (undated) DEVICE — ELECTRODE ES AD PED L65IN TEF INSUL MOD NONCORDED NDL TIP

## (undated) DEVICE — GLOVE SURG SZ 75 STD WHT LTX SYN POLYMER BEAD REINF ANTI RL

## (undated) DEVICE — STRAP POS MP 30X3 IN HK LOOP CLOSURE FOAM DISP

## (undated) DEVICE — SURGICAL PROCEDURE PACK TRAUM

## (undated) DEVICE — AGENT HEMSTAT 5GM ARISTA AH

## (undated) DEVICE — STAPLER INT DIA5MM 25 ABSRB STRP FIX DISP FOR HERN MESH

## (undated) DEVICE — 4-PORT MANIFOLD: Brand: NEPTUNE 2

## (undated) DEVICE — CANNULA NSL ORAL AD FOR CAPNOFLEX CO2 O2 AIRLFE

## (undated) DEVICE — GLOVE ORANGE PI 8   MSG9080

## (undated) DEVICE — 18 GA N.G. KIT, 10 PACK: Brand: SITE-RITE

## (undated) DEVICE — MARKER,SKIN,WI/RULER AND LABELS: Brand: MEDLINE

## (undated) DEVICE — GAUZE,SPONGE,4"X4",8PLY,STRL,LF,10/TRAY: Brand: MEDLINE

## (undated) DEVICE — PAD GEN USE BORDERED ADH 14IN 2IN AND 12IN 4IN GZ UNIV ST

## (undated) DEVICE — NEPTUNE E-SEP 125MM SUCTION SLEEVE: Brand: NEPTUNE E-SEP

## (undated) DEVICE — RETRACTOR BOOKWALTER POSTGENERAL

## (undated) DEVICE — 1000 S-DRAPE TOWEL DRAPE 10/BX: Brand: STERI-DRAPE™

## (undated) DEVICE — COUNTER NDL 30 COUNT DBL MAG

## (undated) DEVICE — SYRINGE MED 20ML STD CLR PLAS LUERSLIP TIP N CTRL DISP

## (undated) DEVICE — GAUZE,SPONGE,4"X4",16PLY,XRAY,STRL,LF: Brand: MEDLINE

## (undated) DEVICE — 450 ML BOTTLE OF 0.05% CHLORHEXIDINE GLUCONATE IN 99.95% STERILE WATER FOR IRRIGATION, USP AND APPLICATOR.: Brand: IRRISEPT ANTIMICROBIAL WOUND LAVAGE

## (undated) DEVICE — BIT DRL L200MM DIA2.8MM CALIB L100MM FOR 3.5MM VA LCP PROX

## (undated) DEVICE — SINGLE USE BIOPSY VALVE MAJ-210: Brand: SINGLE USE BIOPSY VALVE (STERILE)

## (undated) DEVICE — BAG,SPONGE COUNTER,BLUE,50/BX,5BX/CS: Brand: MEDLINE

## (undated) DEVICE — ADHESIVE SKIN CLSR 0.7ML TOP DERMBND ADV

## (undated) DEVICE — PADDING,UNDERCAST,COTTON, 4"X4YD STERILE: Brand: MEDLINE

## (undated) DEVICE — CONNECTOR TBNG AUX H2O JET DISP FOR OLY 160/180 SER

## (undated) DEVICE — SINGLE HOSE WITH CPC CONNECTORS: Brand: A.T.S.®

## (undated) DEVICE — DISSECTOR LAP DIA5MM BLNT TIP ENDOPATH

## (undated) DEVICE — BANDAGE COMPR W4INXL10YD WHITE/BEIGE E MTRX HK LOOP CLSR

## (undated) DEVICE — COVER,LIGHT HANDLE,FLX,2/PK: Brand: MEDLINE INDUSTRIES, INC.

## (undated) DEVICE — NEEDLE SPNL GRN 18GAX3 1/2IN

## (undated) DEVICE — SKIN AFFIX SURG ADHESIVE 72/CS 0.55ML: Brand: MEDLINE

## (undated) DEVICE — RADIFOCUS GLIDEWIRE ADVANTAGE GUIDEWIRE: Brand: GLIDEWIRE ADVANTAGE

## (undated) DEVICE — FORCEPS BX L240CM JAW DIA2.4MM WRK CHN 2.8MM ORNG L CAP W/

## (undated) DEVICE — 3M™ IOBAN™ 2 ANTIMICROBIAL INCISE DRAPE 6640EZ: Brand: IOBAN™ 2

## (undated) DEVICE — BRONCHOSCOPY PACK: Brand: MEDLINE INDUSTRIES, INC.

## (undated) DEVICE — DOUBLE  SWIVEL ELBOW 15M - DOUBLE FLIP TOP CAP WITH SEAL - 22M/15F: Brand: DOUBLE  SWIVEL ELBOW 15M - DOUBLE FLIP TOP CAP WITH SEAL - 22M/15F

## (undated) DEVICE — SOLUTION IV 500ML 0.9% SOD CHL PH 5 INJ USP VIAFLX PLAS

## (undated) DEVICE — TRAY VCF/TESIO TRAY  REUSABLE

## (undated) DEVICE — TIBURON GENERAL ENDOSCOPY DRAPE: Brand: CONVERTORS

## (undated) DEVICE — SPONGE LAP W18XL18IN WHT COT 4 PLY FLD STRUNG RADPQ DISP ST

## (undated) DEVICE — GOWN,SIRUS,FABRNF,L,20/CS: Brand: MEDLINE

## (undated) DEVICE — VALVE SUCTION AIR H2O SET ORCA POD + DISP

## (undated) DEVICE — LARGE BORE STOPCOCK WITH ROTATING MALE LUER LOCK

## (undated) DEVICE — RELOAD STPL L75MM OPN H3.8MM CLS 1.5MM WIRE DIA0.2MM REG

## (undated) DEVICE — BIT DRL L110MM DIA2.5MM ST G QUIK CPL NONRADIOPAQUE W/O STP

## (undated) DEVICE — SOLUTION IV IRRIG POUR BRL 0.9% SODIUM CHL 2F7124

## (undated) DEVICE — RETRACTOR BOOKWALTER RING GENERAL

## (undated) DEVICE — GAUZE,SPONGE,POST-OP,4X3,STRL,LF: Brand: MEDLINE

## (undated) DEVICE — KIRSCHNER WIRE

## (undated) DEVICE — TOWEL,OR,DSP,ST,GREEN,DLX,XR,4/PK,20PK/C: Brand: MEDLINE

## (undated) DEVICE — SNARE ENDOSCP L240CM W15MM SHTH DIA2.4MM CHN 2.8MM STIFF

## (undated) DEVICE — CLOTH SURG PREP PREOPERATIVE CHLORHEXIDINE GLUC 2% READYPREP

## (undated) DEVICE — GRADUATE TRIANG MEASURE 1000ML BLK PRNT

## (undated) DEVICE — DECANTER BAG 9": Brand: MEDLINE INDUSTRIES, INC.

## (undated) DEVICE — LOWER EXT KNEE DRAPE: Brand: MEDLINE INDUSTRIES, INC.

## (undated) DEVICE — INSUFFLATION TUBING SET WITH FILTER, FUNNEL CONNECTOR AND LUER LOCK: Brand: JOSNOE MEDICAL INC

## (undated) DEVICE — ADHESIVE SKIN CLOSURE TOP 36 CC HI VISC DERMBND MINI

## (undated) DEVICE — CLAMP INSERT: Brand: STEALTH® CLAMP INSERT